# Patient Record
Sex: MALE | NOT HISPANIC OR LATINO | URBAN - METROPOLITAN AREA
[De-identification: names, ages, dates, MRNs, and addresses within clinical notes are randomized per-mention and may not be internally consistent; named-entity substitution may affect disease eponyms.]

---

## 2024-11-13 ENCOUNTER — EMERGENCY (EMERGENCY)
Facility: HOSPITAL | Age: 69
LOS: 1 days | Discharge: HOME | End: 2024-11-13
Attending: EMERGENCY MEDICINE | Admitting: EMERGENCY MEDICINE
Payer: MEDICARE

## 2024-11-13 VITALS
SYSTOLIC BLOOD PRESSURE: 137 MMHG | TEMPERATURE: 98 F | DIASTOLIC BLOOD PRESSURE: 59 MMHG | RESPIRATION RATE: 20 BRPM | OXYGEN SATURATION: 98 % | HEART RATE: 56 BPM

## 2024-11-13 VITALS
RESPIRATION RATE: 18 BRPM | TEMPERATURE: 98 F | OXYGEN SATURATION: 95 % | DIASTOLIC BLOOD PRESSURE: 63 MMHG | HEART RATE: 68 BPM | SYSTOLIC BLOOD PRESSURE: 174 MMHG

## 2024-11-13 DIAGNOSIS — N13.5 CROSSING VESSEL AND STRICTURE OF URETER WITHOUT HYDRONEPHROSIS: Chronic | ICD-10-CM

## 2024-11-13 DIAGNOSIS — Z98.89 OTHER SPECIFIED POSTPROCEDURAL STATES: Chronic | ICD-10-CM

## 2024-11-13 DIAGNOSIS — Z94.0 KIDNEY TRANSPLANT STATUS: Chronic | ICD-10-CM

## 2024-11-13 DIAGNOSIS — I77.0 ARTERIOVENOUS FISTULA, ACQUIRED: Chronic | ICD-10-CM

## 2024-11-13 DIAGNOSIS — Z90.49 ACQUIRED ABSENCE OF OTHER SPECIFIED PARTS OF DIGESTIVE TRACT: Chronic | ICD-10-CM

## 2024-11-13 DIAGNOSIS — Z90.5 ACQUIRED ABSENCE OF KIDNEY: Chronic | ICD-10-CM

## 2024-11-13 DIAGNOSIS — Z98.42 CATARACT EXTRACTION STATUS, LEFT EYE: Chronic | ICD-10-CM

## 2024-11-13 DIAGNOSIS — Z98.41 CATARACT EXTRACTION STATUS, RIGHT EYE: Chronic | ICD-10-CM

## 2024-11-13 LAB
ALBUMIN SERPL ELPH-MCNC: 2.4 G/DL — LOW (ref 3.3–5)
ALP SERPL-CCNC: 96 U/L — SIGNIFICANT CHANGE UP (ref 40–120)
ALT FLD-CCNC: 59 U/L — SIGNIFICANT CHANGE UP (ref 12–78)
ANION GAP SERPL CALC-SCNC: 8 MMOL/L — SIGNIFICANT CHANGE UP (ref 5–17)
APTT BLD: 35.9 SEC — HIGH (ref 24.5–35.6)
AST SERPL-CCNC: 73 U/L — HIGH (ref 15–37)
BASOPHILS # BLD AUTO: 0.02 K/UL — SIGNIFICANT CHANGE UP (ref 0–0.2)
BASOPHILS NFR BLD AUTO: 0.4 % — SIGNIFICANT CHANGE UP (ref 0–2)
BILIRUB SERPL-MCNC: 0.6 MG/DL — SIGNIFICANT CHANGE UP (ref 0.2–1.2)
BUN SERPL-MCNC: 21 MG/DL — SIGNIFICANT CHANGE UP (ref 7–23)
CALCIUM SERPL-MCNC: 10 MG/DL — SIGNIFICANT CHANGE UP (ref 8.5–10.1)
CHLORIDE SERPL-SCNC: 106 MMOL/L — SIGNIFICANT CHANGE UP (ref 96–108)
CO2 SERPL-SCNC: 28 MMOL/L — SIGNIFICANT CHANGE UP (ref 22–31)
CREAT SERPL-MCNC: 1.2 MG/DL — SIGNIFICANT CHANGE UP (ref 0.5–1.3)
EGFR: 67 ML/MIN/1.73M2 — SIGNIFICANT CHANGE UP
EGFR: 67 ML/MIN/1.73M2 — SIGNIFICANT CHANGE UP
EOSINOPHIL # BLD AUTO: 0.06 K/UL — SIGNIFICANT CHANGE UP (ref 0–0.5)
EOSINOPHIL NFR BLD AUTO: 1.1 % — SIGNIFICANT CHANGE UP (ref 0–6)
GLUCOSE SERPL-MCNC: 135 MG/DL — HIGH (ref 70–99)
HCT VFR BLD CALC: 22.4 % — LOW (ref 39–50)
HGB BLD-MCNC: 7.1 G/DL — LOW (ref 13–17)
IMM GRANULOCYTES NFR BLD AUTO: 0.9 % — SIGNIFICANT CHANGE UP (ref 0–0.9)
INR BLD: 1.16 RATIO — SIGNIFICANT CHANGE UP (ref 0.85–1.16)
LYMPHOCYTES # BLD AUTO: 0.46 K/UL — LOW (ref 1–3.3)
LYMPHOCYTES # BLD AUTO: 8.6 % — LOW (ref 13–44)
MCHC RBC-ENTMCNC: 28.3 PG — SIGNIFICANT CHANGE UP (ref 27–34)
MCHC RBC-ENTMCNC: 31.7 G/DL — LOW (ref 32–36)
MCV RBC AUTO: 89.2 FL — SIGNIFICANT CHANGE UP (ref 80–100)
MONOCYTES # BLD AUTO: 0.52 K/UL — SIGNIFICANT CHANGE UP (ref 0–0.9)
MONOCYTES NFR BLD AUTO: 9.7 % — SIGNIFICANT CHANGE UP (ref 2–14)
NEUTROPHILS # BLD AUTO: 4.23 K/UL — SIGNIFICANT CHANGE UP (ref 1.8–7.4)
NEUTROPHILS NFR BLD AUTO: 79.3 % — HIGH (ref 43–77)
NRBC # BLD: 0 /100 WBCS — SIGNIFICANT CHANGE UP (ref 0–0)
NRBC BLD-RTO: 0 /100 WBCS — SIGNIFICANT CHANGE UP (ref 0–0)
OB PNL STL: NEGATIVE — SIGNIFICANT CHANGE UP
PLATELET # BLD AUTO: 457 K/UL — HIGH (ref 150–400)
POTASSIUM SERPL-MCNC: 4.1 MMOL/L — SIGNIFICANT CHANGE UP (ref 3.5–5.3)
POTASSIUM SERPL-SCNC: 4.1 MMOL/L — SIGNIFICANT CHANGE UP (ref 3.5–5.3)
PROT SERPL-MCNC: 7.1 G/DL — SIGNIFICANT CHANGE UP (ref 6–8.3)
PROTHROM AB SERPL-ACNC: 13.6 SEC — HIGH (ref 9.9–13.4)
RBC # BLD: 2.51 M/UL — LOW (ref 4.2–5.8)
RBC # FLD: 18.8 % — HIGH (ref 10.3–14.5)
SODIUM SERPL-SCNC: 142 MMOL/L — SIGNIFICANT CHANGE UP (ref 135–145)
WBC # BLD: 5.29 K/UL — SIGNIFICANT CHANGE UP (ref 3.8–10.5)
WBC # FLD AUTO: 5.29 K/UL — SIGNIFICANT CHANGE UP (ref 3.8–10.5)

## 2024-11-13 PROCEDURE — 99285 EMERGENCY DEPT VISIT HI MDM: CPT | Mod: FS

## 2024-11-13 PROCEDURE — 93010 ELECTROCARDIOGRAM REPORT: CPT

## 2024-11-20 PROCEDURE — 86901 BLOOD TYPING SEROLOGIC RH(D): CPT

## 2024-11-20 PROCEDURE — 86923 COMPATIBILITY TEST ELECTRIC: CPT

## 2024-11-20 PROCEDURE — 86850 RBC ANTIBODY SCREEN: CPT

## 2024-11-20 PROCEDURE — 85730 THROMBOPLASTIN TIME PARTIAL: CPT

## 2024-11-20 PROCEDURE — 85025 COMPLETE CBC W/AUTO DIFF WBC: CPT

## 2024-11-20 PROCEDURE — 36415 COLL VENOUS BLD VENIPUNCTURE: CPT

## 2024-11-20 PROCEDURE — 85610 PROTHROMBIN TIME: CPT

## 2024-11-20 PROCEDURE — 82272 OCCULT BLD FECES 1-3 TESTS: CPT

## 2024-11-20 PROCEDURE — 93005 ELECTROCARDIOGRAM TRACING: CPT

## 2024-11-20 PROCEDURE — P9016: CPT

## 2024-11-20 PROCEDURE — 86900 BLOOD TYPING SEROLOGIC ABO: CPT

## 2024-11-20 PROCEDURE — 80053 COMPREHEN METABOLIC PANEL: CPT

## 2024-11-20 PROCEDURE — 99285 EMERGENCY DEPT VISIT HI MDM: CPT | Mod: 25

## 2024-11-20 PROCEDURE — 36430 TRANSFUSION BLD/BLD COMPNT: CPT

## 2024-11-29 ENCOUNTER — INPATIENT (INPATIENT)
Facility: HOSPITAL | Age: 69
LOS: 18 days | Discharge: NURSING, REHAB OR LONG-TERM CARE | DRG: 872 | End: 2024-12-18
Attending: FAMILY MEDICINE | Admitting: STUDENT IN AN ORGANIZED HEALTH CARE EDUCATION/TRAINING PROGRAM
Payer: MEDICARE

## 2024-11-29 ENCOUNTER — RESULT REVIEW (OUTPATIENT)
Age: 69
End: 2024-11-29

## 2024-11-29 VITALS
WEIGHT: 169.98 LBS | DIASTOLIC BLOOD PRESSURE: 64 MMHG | RESPIRATION RATE: 16 BRPM | OXYGEN SATURATION: 98 % | SYSTOLIC BLOOD PRESSURE: 180 MMHG | HEIGHT: 64 IN | TEMPERATURE: 101 F | HEART RATE: 101 BPM

## 2024-11-29 DIAGNOSIS — A41.9 SEPSIS, UNSPECIFIED ORGANISM: ICD-10-CM

## 2024-11-29 DIAGNOSIS — Z98.42 CATARACT EXTRACTION STATUS, LEFT EYE: Chronic | ICD-10-CM

## 2024-11-29 DIAGNOSIS — I77.0 ARTERIOVENOUS FISTULA, ACQUIRED: Chronic | ICD-10-CM

## 2024-11-29 DIAGNOSIS — Z98.89 OTHER SPECIFIED POSTPROCEDURAL STATES: Chronic | ICD-10-CM

## 2024-11-29 DIAGNOSIS — Z98.41 CATARACT EXTRACTION STATUS, RIGHT EYE: Chronic | ICD-10-CM

## 2024-11-29 DIAGNOSIS — Z90.5 ACQUIRED ABSENCE OF KIDNEY: Chronic | ICD-10-CM

## 2024-11-29 DIAGNOSIS — N13.5 CROSSING VESSEL AND STRICTURE OF URETER WITHOUT HYDRONEPHROSIS: Chronic | ICD-10-CM

## 2024-11-29 DIAGNOSIS — Z94.0 KIDNEY TRANSPLANT STATUS: Chronic | ICD-10-CM

## 2024-11-29 DIAGNOSIS — Z90.49 ACQUIRED ABSENCE OF OTHER SPECIFIED PARTS OF DIGESTIVE TRACT: Chronic | ICD-10-CM

## 2024-11-29 LAB
-  CTX-M RESISTANCE MARKER: SIGNIFICANT CHANGE UP
-  ESBL: SIGNIFICANT CHANGE UP
ALBUMIN SERPL ELPH-MCNC: 2.4 G/DL — LOW (ref 3.3–5)
ALP SERPL-CCNC: 116 U/L — SIGNIFICANT CHANGE UP (ref 40–120)
ALT FLD-CCNC: 44 U/L — SIGNIFICANT CHANGE UP (ref 12–78)
ANION GAP SERPL CALC-SCNC: 8 MMOL/L — SIGNIFICANT CHANGE UP (ref 5–17)
ANISOCYTOSIS BLD QL: SLIGHT — SIGNIFICANT CHANGE UP
APPEARANCE UR: ABNORMAL
APTT BLD: 28.8 SEC — SIGNIFICANT CHANGE UP (ref 24.5–35.6)
AST SERPL-CCNC: 47 U/L — HIGH (ref 15–37)
BACTERIA # UR AUTO: ABNORMAL /HPF
BASOPHILS # BLD AUTO: 0 K/UL — SIGNIFICANT CHANGE UP (ref 0–0.2)
BASOPHILS NFR BLD AUTO: 0 % — SIGNIFICANT CHANGE UP (ref 0–2)
BILIRUB SERPL-MCNC: 0.9 MG/DL — SIGNIFICANT CHANGE UP (ref 0.2–1.2)
BILIRUB UR-MCNC: NEGATIVE — SIGNIFICANT CHANGE UP
BUN SERPL-MCNC: 22 MG/DL — SIGNIFICANT CHANGE UP (ref 7–23)
CALCIUM SERPL-MCNC: 12.2 MG/DL — HIGH (ref 8.5–10.1)
CHLORIDE SERPL-SCNC: 102 MMOL/L — SIGNIFICANT CHANGE UP (ref 96–108)
CO2 SERPL-SCNC: 30 MMOL/L — SIGNIFICANT CHANGE UP (ref 22–31)
COD CRY URNS QL: PRESENT
COLOR SPEC: YELLOW — SIGNIFICANT CHANGE UP
CREAT SERPL-MCNC: 0.98 MG/DL — SIGNIFICANT CHANGE UP (ref 0.5–1.3)
CRP SERPL-MCNC: 226 MG/L — HIGH
DIFF PNL FLD: NEGATIVE — SIGNIFICANT CHANGE UP
E COLI DNA BLD POS QL NAA+NON-PROBE: SIGNIFICANT CHANGE UP
EGFR: 85 ML/MIN/1.73M2 — SIGNIFICANT CHANGE UP
EOSINOPHIL # BLD AUTO: 0 K/UL — SIGNIFICANT CHANGE UP (ref 0–0.5)
EOSINOPHIL NFR BLD AUTO: 0 % — SIGNIFICANT CHANGE UP (ref 0–6)
EPI CELLS # UR: PRESENT
ERYTHROCYTE [SEDIMENTATION RATE] IN BLOOD: 117 MM/HR — HIGH (ref 0–20)
FLUAV AG NPH QL: SIGNIFICANT CHANGE UP
FLUBV AG NPH QL: SIGNIFICANT CHANGE UP
GLUCOSE SERPL-MCNC: 229 MG/DL — HIGH (ref 70–99)
GLUCOSE UR QL: NEGATIVE MG/DL — SIGNIFICANT CHANGE UP
GRAM STN FLD: ABNORMAL
HCT VFR BLD CALC: 28.4 % — LOW (ref 39–50)
HGB BLD-MCNC: 9.2 G/DL — LOW (ref 13–17)
INR BLD: 1.13 RATIO — SIGNIFICANT CHANGE UP (ref 0.85–1.16)
KETONES UR-MCNC: NEGATIVE MG/DL — SIGNIFICANT CHANGE UP
LACTATE SERPL-SCNC: 1.8 MMOL/L — SIGNIFICANT CHANGE UP (ref 0.7–2)
LDH SERPL L TO P-CCNC: 303 U/L — HIGH (ref 50–242)
LEUKOCYTE ESTERASE UR-ACNC: ABNORMAL
LYMPHOCYTES # BLD AUTO: 0.1 K/UL — LOW (ref 1–3.3)
LYMPHOCYTES # BLD AUTO: 2 % — LOW (ref 13–44)
MACROCYTES BLD QL: SLIGHT — SIGNIFICANT CHANGE UP
MANUAL SMEAR VERIFICATION: SIGNIFICANT CHANGE UP
MCHC RBC-ENTMCNC: 29.1 PG — SIGNIFICANT CHANGE UP (ref 27–34)
MCHC RBC-ENTMCNC: 32.4 G/DL — SIGNIFICANT CHANGE UP (ref 32–36)
MCV RBC AUTO: 89.9 FL — SIGNIFICANT CHANGE UP (ref 80–100)
METHOD TYPE: SIGNIFICANT CHANGE UP
MICROCYTES BLD QL: SIGNIFICANT CHANGE UP
MONOCYTES # BLD AUTO: 0 K/UL — SIGNIFICANT CHANGE UP (ref 0–0.9)
MONOCYTES NFR BLD AUTO: 0 % — LOW (ref 2–14)
MYELOCYTES NFR BLD: 1 % — HIGH (ref 0–0)
NEUTROPHILS # BLD AUTO: 4.92 K/UL — SIGNIFICANT CHANGE UP (ref 1.8–7.4)
NEUTROPHILS NFR BLD AUTO: 92 % — HIGH (ref 43–77)
NEUTS BAND # BLD: 5 % — SIGNIFICANT CHANGE UP (ref 0–8)
NITRITE UR-MCNC: POSITIVE
NRBC # BLD: 0 /100 WBCS — SIGNIFICANT CHANGE UP (ref 0–0)
NRBC # BLD: SIGNIFICANT CHANGE UP /100 WBCS (ref 0–0)
OVALOCYTES BLD QL SMEAR: SLIGHT — SIGNIFICANT CHANGE UP
PH UR: 5.5 — SIGNIFICANT CHANGE UP (ref 5–8)
PHOSPHATE SERPL-MCNC: 3.2 MG/DL — SIGNIFICANT CHANGE UP (ref 2.5–4.5)
PLAT MORPH BLD: NORMAL — SIGNIFICANT CHANGE UP
PLATELET # BLD AUTO: 485 K/UL — HIGH (ref 150–400)
PLATELET CLUMP BLD QL SMEAR: ABNORMAL
POIKILOCYTOSIS BLD QL AUTO: SLIGHT — SIGNIFICANT CHANGE UP
POTASSIUM SERPL-MCNC: 3.8 MMOL/L — SIGNIFICANT CHANGE UP (ref 3.5–5.3)
POTASSIUM SERPL-SCNC: 3.8 MMOL/L — SIGNIFICANT CHANGE UP (ref 3.5–5.3)
PROT SERPL-MCNC: 6.9 G/DL — SIGNIFICANT CHANGE UP (ref 6–8.3)
PROT SERPL-MCNC: 7.6 G/DL — SIGNIFICANT CHANGE UP (ref 6–8.3)
PROT UR-MCNC: 30 MG/DL
PROTHROM AB SERPL-ACNC: 13.2 SEC — SIGNIFICANT CHANGE UP (ref 9.9–13.4)
PSA FLD-MCNC: 12.3 NG/ML — HIGH (ref 0–4)
RBC # BLD: 3.16 M/UL — LOW (ref 4.2–5.8)
RBC # FLD: 20.2 % — HIGH (ref 10.3–14.5)
RBC BLD AUTO: ABNORMAL
RBC CASTS # UR COMP ASSIST: 2 /HPF — SIGNIFICANT CHANGE UP (ref 0–4)
RSV RNA NPH QL NAA+NON-PROBE: SIGNIFICANT CHANGE UP
SARS-COV-2 RNA SPEC QL NAA+PROBE: SIGNIFICANT CHANGE UP
SODIUM SERPL-SCNC: 140 MMOL/L — SIGNIFICANT CHANGE UP (ref 135–145)
SP GR SPEC: 1.02 — SIGNIFICANT CHANGE UP (ref 1–1.03)
SPECIMEN SOURCE: SIGNIFICANT CHANGE UP
SPECIMEN SOURCE: SIGNIFICANT CHANGE UP
URATE CRY FLD QL MICRO: PRESENT
UROBILINOGEN FLD QL: 1 MG/DL — SIGNIFICANT CHANGE UP (ref 0.2–1)
VANCOMYCIN TROUGH SERPL-MCNC: 7.6 UG/ML — LOW (ref 10–20)
WBC # BLD: 5.07 K/UL — SIGNIFICANT CHANGE UP (ref 3.8–10.5)
WBC # FLD AUTO: 5.07 K/UL — SIGNIFICANT CHANGE UP (ref 3.8–10.5)
WBC UR QL: 67 /HPF — HIGH (ref 0–5)

## 2024-11-29 PROCEDURE — 76376 3D RENDER W/INTRP POSTPROCES: CPT | Mod: 26

## 2024-11-29 PROCEDURE — 72190 X-RAY EXAM OF PELVIS: CPT | Mod: 26

## 2024-11-29 PROCEDURE — 99285 EMERGENCY DEPT VISIT HI MDM: CPT

## 2024-11-29 PROCEDURE — 99223 1ST HOSP IP/OBS HIGH 75: CPT

## 2024-11-29 PROCEDURE — 93010 ELECTROCARDIOGRAM REPORT: CPT

## 2024-11-29 PROCEDURE — 74176 CT ABD & PELVIS W/O CONTRAST: CPT | Mod: 26,MC

## 2024-11-29 PROCEDURE — 93306 TTE W/DOPPLER COMPLETE: CPT | Mod: 26

## 2024-11-29 PROCEDURE — 71045 X-RAY EXAM CHEST 1 VIEW: CPT | Mod: 26

## 2024-11-29 PROCEDURE — 72192 CT PELVIS W/O DYE: CPT | Mod: 26,XE

## 2024-11-29 PROCEDURE — 72220 X-RAY EXAM SACRUM TAILBONE: CPT | Mod: 26

## 2024-11-29 PROCEDURE — 71250 CT THORAX DX C-: CPT | Mod: 26,MC

## 2024-11-29 RX ORDER — LISINOPRIL 20 MG/1
20 TABLET ORAL DAILY
Refills: 0 | Status: DISCONTINUED | OUTPATIENT
Start: 2024-11-29 | End: 2024-12-05

## 2024-11-29 RX ORDER — AMLODIPINE BESYLATE 10 MG/1
10 TABLET ORAL DAILY
Refills: 0 | Status: DISCONTINUED | OUTPATIENT
Start: 2024-11-29 | End: 2024-12-18

## 2024-11-29 RX ORDER — LEVOTHYROXINE SODIUM 150 MCG
88 TABLET ORAL
Refills: 0 | Status: DISCONTINUED | OUTPATIENT
Start: 2024-11-29 | End: 2024-12-18

## 2024-11-29 RX ORDER — MULTIVIT WITH MINERALS/LUTEIN
500 TABLET ORAL
Refills: 0 | Status: DISCONTINUED | OUTPATIENT
Start: 2024-11-29 | End: 2024-12-18

## 2024-11-29 RX ORDER — HYDRALAZINE HYDROCHLORIDE 10 MG/1
10 TABLET ORAL EVERY 8 HOURS
Refills: 0 | Status: DISCONTINUED | OUTPATIENT
Start: 2024-11-29 | End: 2024-12-18

## 2024-11-29 RX ORDER — LISINOPRIL 20 MG/1
1 TABLET ORAL
Refills: 0 | DISCHARGE

## 2024-11-29 RX ORDER — 0.9 % SODIUM CHLORIDE 0.9 %
1000 INTRAVENOUS SOLUTION INTRAVENOUS
Refills: 0 | Status: DISCONTINUED | OUTPATIENT
Start: 2024-11-29 | End: 2024-12-18

## 2024-11-29 RX ORDER — ONDANSETRON HYDROCHLORIDE 4 MG/1
4 TABLET, FILM COATED ORAL EVERY 8 HOURS
Refills: 0 | Status: DISCONTINUED | OUTPATIENT
Start: 2024-11-29 | End: 2024-12-18

## 2024-11-29 RX ORDER — FERROUS SULFATE 325(65) MG
325 TABLET ORAL
Refills: 0 | DISCHARGE

## 2024-11-29 RX ORDER — ACETAMINOPHEN, DIPHENHYDRAMINE HCL, PHENYLEPHRINE HCL 325; 25; 5 MG/1; MG/1; MG/1
3 TABLET ORAL AT BEDTIME
Refills: 0 | Status: DISCONTINUED | OUTPATIENT
Start: 2024-11-29 | End: 2024-12-18

## 2024-11-29 RX ORDER — TACROLIMUS 5 MG/1
2 CAPSULE ORAL DAILY
Refills: 0 | Status: DISCONTINUED | OUTPATIENT
Start: 2024-11-29 | End: 2024-12-18

## 2024-11-29 RX ORDER — PIPERACILLIN SODIUM AND TAZOBACTAM SODIUM 4; .5 G/20ML; G/20ML
3.38 INJECTION, POWDER, LYOPHILIZED, FOR SOLUTION INTRAVENOUS ONCE
Refills: 0 | Status: COMPLETED | OUTPATIENT
Start: 2024-11-29 | End: 2024-11-29

## 2024-11-29 RX ORDER — CEFEPIME 2 G/1
2000 INJECTION, POWDER, FOR SOLUTION INTRAVENOUS EVERY 8 HOURS
Refills: 0 | Status: DISCONTINUED | OUTPATIENT
Start: 2024-11-29 | End: 2024-11-29

## 2024-11-29 RX ORDER — ROSUVASTATIN CALCIUM 5 MG/1
1 TABLET, FILM COATED ORAL
Refills: 0 | DISCHARGE

## 2024-11-29 RX ORDER — CHOLECALCIFEROL (VITAMIN D3) 10MCG/0.25
1000 DROPS ORAL DAILY
Refills: 0 | Status: DISCONTINUED | OUTPATIENT
Start: 2024-11-29 | End: 2024-12-03

## 2024-11-29 RX ORDER — PYRIDOXINE HCL (VITAMIN B6) 25 MG
1 TABLET ORAL
Refills: 0 | DISCHARGE

## 2024-11-29 RX ORDER — ESCITALOPRAM OXALATE 10 MG/1
1 TABLET, FILM COATED ORAL
Refills: 0 | DISCHARGE

## 2024-11-29 RX ORDER — SODIUM CHLORIDE 9 MG/ML
1000 INJECTION, SOLUTION INTRAMUSCULAR; INTRAVENOUS; SUBCUTANEOUS ONCE
Refills: 0 | Status: COMPLETED | OUTPATIENT
Start: 2024-11-29 | End: 2024-11-29

## 2024-11-29 RX ORDER — ESCITALOPRAM OXALATE 10 MG/1
10 TABLET, FILM COATED ORAL
Refills: 0 | Status: DISCONTINUED | OUTPATIENT
Start: 2024-11-29 | End: 2024-12-18

## 2024-11-29 RX ORDER — ACETAMINOPHEN 500MG 500 MG/1
650 TABLET, COATED ORAL EVERY 6 HOURS
Refills: 0 | Status: DISCONTINUED | OUTPATIENT
Start: 2024-11-29 | End: 2024-11-29

## 2024-11-29 RX ORDER — METOPROLOL TARTRATE 100 MG/1
50 TABLET, FILM COATED ORAL
Refills: 0 | Status: DISCONTINUED | OUTPATIENT
Start: 2024-11-29 | End: 2024-11-30

## 2024-11-29 RX ORDER — SODIUM CHLORIDE 9 MG/ML
500 INJECTION, SOLUTION INTRAMUSCULAR; INTRAVENOUS; SUBCUTANEOUS ONCE
Refills: 0 | Status: COMPLETED | OUTPATIENT
Start: 2024-11-29 | End: 2024-11-29

## 2024-11-29 RX ORDER — MEROPENEM 500 MG/1
1000 INJECTION, POWDER, FOR SOLUTION INTRAVENOUS EVERY 8 HOURS
Refills: 0 | Status: DISCONTINUED | OUTPATIENT
Start: 2024-11-29 | End: 2024-11-29

## 2024-11-29 RX ORDER — TACROLIMUS 5 MG/1
1 CAPSULE ORAL AT BEDTIME
Refills: 0 | Status: DISCONTINUED | OUTPATIENT
Start: 2024-11-29 | End: 2024-12-18

## 2024-11-29 RX ORDER — MINERAL OIL
133 OIL (ML) ORAL ONCE
Refills: 0 | Status: DISCONTINUED | OUTPATIENT
Start: 2024-11-29 | End: 2024-12-18

## 2024-11-29 RX ORDER — INSULIN GLARGINE 100 [IU]/ML
17 INJECTION, SOLUTION SUBCUTANEOUS AT BEDTIME
Refills: 0 | Status: DISCONTINUED | OUTPATIENT
Start: 2024-11-29 | End: 2024-12-04

## 2024-11-29 RX ORDER — GLUCAGON INJECTION, SOLUTION 0.5 MG/.1ML
1 INJECTION, SOLUTION SUBCUTANEOUS ONCE
Refills: 0 | Status: DISCONTINUED | OUTPATIENT
Start: 2024-11-29 | End: 2024-12-18

## 2024-11-29 RX ORDER — MEROPENEM 500 MG/1
1000 INJECTION, POWDER, FOR SOLUTION INTRAVENOUS ONCE
Refills: 0 | Status: COMPLETED | OUTPATIENT
Start: 2024-11-29 | End: 2024-11-29

## 2024-11-29 RX ORDER — HEPARIN SODIUM,PORCINE 1000/ML
5000 VIAL (ML) INJECTION EVERY 8 HOURS
Refills: 0 | Status: DISCONTINUED | OUTPATIENT
Start: 2024-11-29 | End: 2024-11-30

## 2024-11-29 RX ORDER — ACETAMINOPHEN 500MG 500 MG/1
1000 TABLET, COATED ORAL ONCE
Refills: 0 | Status: COMPLETED | OUTPATIENT
Start: 2024-11-29 | End: 2024-11-29

## 2024-11-29 RX ORDER — MEROPENEM 500 MG/1
1000 INJECTION, POWDER, FOR SOLUTION INTRAVENOUS EVERY 8 HOURS
Refills: 0 | Status: DISCONTINUED | OUTPATIENT
Start: 2024-11-30 | End: 2024-11-30

## 2024-11-29 RX ORDER — MEROPENEM 500 MG/1
INJECTION, POWDER, FOR SOLUTION INTRAVENOUS
Refills: 0 | Status: DISCONTINUED | OUTPATIENT
Start: 2024-11-29 | End: 2024-11-30

## 2024-11-29 RX ORDER — PYRIDOXINE HCL (VITAMIN B6) 25 MG
50 TABLET ORAL DAILY
Refills: 0 | Status: DISCONTINUED | OUTPATIENT
Start: 2024-11-29 | End: 2024-12-18

## 2024-11-29 RX ORDER — VANCOMYCIN HCL 900 MCG/MG
1250 POWDER (GRAM) MISCELLANEOUS EVERY 12 HOURS
Refills: 0 | Status: DISCONTINUED | OUTPATIENT
Start: 2024-11-29 | End: 2024-11-29

## 2024-11-29 RX ORDER — CEFEPIME 2 G/1
INJECTION, POWDER, FOR SOLUTION INTRAVENOUS
Refills: 0 | Status: DISCONTINUED | OUTPATIENT
Start: 2024-11-29 | End: 2024-11-29

## 2024-11-29 RX ORDER — VANCOMYCIN HCL 900 MCG/MG
1000 POWDER (GRAM) MISCELLANEOUS ONCE
Refills: 0 | Status: COMPLETED | OUTPATIENT
Start: 2024-11-29 | End: 2024-11-29

## 2024-11-29 RX ORDER — ROSUVASTATIN CALCIUM 5 MG/1
10 TABLET, FILM COATED ORAL AT BEDTIME
Refills: 0 | Status: DISCONTINUED | OUTPATIENT
Start: 2024-11-29 | End: 2024-12-18

## 2024-11-29 RX ORDER — POLYETHYLENE GLYCOL 3350 17 G/17G
17 POWDER, FOR SOLUTION ORAL DAILY
Refills: 0 | Status: DISCONTINUED | OUTPATIENT
Start: 2024-11-29 | End: 2024-12-18

## 2024-11-29 RX ORDER — SENNOSIDES 8.6 MG
2 TABLET ORAL AT BEDTIME
Refills: 0 | Status: DISCONTINUED | OUTPATIENT
Start: 2024-11-29 | End: 2024-12-18

## 2024-11-29 RX ORDER — FERROUS SULFATE 325(65) MG
325 TABLET ORAL
Refills: 0 | Status: DISCONTINUED | OUTPATIENT
Start: 2024-11-29 | End: 2024-12-18

## 2024-11-29 RX ORDER — MULTIVIT WITH MINERALS/LUTEIN
1 TABLET ORAL
Refills: 0 | DISCHARGE

## 2024-11-29 RX ORDER — METOPROLOL TARTRATE 100 MG/1
5 TABLET, FILM COATED ORAL EVERY 6 HOURS
Refills: 0 | Status: DISCONTINUED | OUTPATIENT
Start: 2024-11-29 | End: 2024-12-18

## 2024-11-29 RX ORDER — MAGNESIUM, ALUMINUM HYDROXIDE 200-225/5
30 SUSPENSION, ORAL (FINAL DOSE FORM) ORAL EVERY 4 HOURS
Refills: 0 | Status: DISCONTINUED | OUTPATIENT
Start: 2024-11-29 | End: 2024-12-18

## 2024-11-29 RX ORDER — ACETAMINOPHEN 500MG 500 MG/1
1000 TABLET, COATED ORAL EVERY 8 HOURS
Refills: 0 | Status: DISCONTINUED | OUTPATIENT
Start: 2024-11-29 | End: 2024-12-08

## 2024-11-29 RX ORDER — HYDRALAZINE HYDROCHLORIDE 10 MG/1
100 TABLET ORAL THREE TIMES A DAY
Refills: 0 | Status: DISCONTINUED | OUTPATIENT
Start: 2024-11-29 | End: 2024-12-18

## 2024-11-29 RX ORDER — AZATHIOPRINE 100 MG/1
50 TABLET ORAL
Refills: 0 | Status: DISCONTINUED | OUTPATIENT
Start: 2024-11-29 | End: 2024-12-18

## 2024-11-29 RX ORDER — BUPROPION HCL 100 MG
300 TABLET ORAL DAILY
Refills: 0 | Status: DISCONTINUED | OUTPATIENT
Start: 2024-11-29 | End: 2024-12-18

## 2024-11-29 RX ORDER — SODIUM CHLORIDE 9 MG/ML
1000 INJECTION, SOLUTION INTRAMUSCULAR; INTRAVENOUS; SUBCUTANEOUS
Refills: 0 | Status: DISCONTINUED | OUTPATIENT
Start: 2024-11-29 | End: 2024-12-01

## 2024-11-29 RX ORDER — CEFEPIME 2 G/1
2000 INJECTION, POWDER, FOR SOLUTION INTRAVENOUS ONCE
Refills: 0 | Status: COMPLETED | OUTPATIENT
Start: 2024-11-29 | End: 2024-11-29

## 2024-11-29 RX ADMIN — Medication 166.67 MILLIGRAM(S): at 20:38

## 2024-11-29 RX ADMIN — PIPERACILLIN SODIUM AND TAZOBACTAM SODIUM 200 GRAM(S): 4; .5 INJECTION, POWDER, LYOPHILIZED, FOR SOLUTION INTRAVENOUS at 07:53

## 2024-11-29 RX ADMIN — Medication 2 TABLET(S): at 23:11

## 2024-11-29 RX ADMIN — SODIUM CHLORIDE 150 MILLILITER(S): 9 INJECTION, SOLUTION INTRAMUSCULAR; INTRAVENOUS; SUBCUTANEOUS at 17:37

## 2024-11-29 RX ADMIN — Medication 6: at 17:37

## 2024-11-29 RX ADMIN — ACETAMINOPHEN 500MG 400 MILLIGRAM(S): 500 TABLET, COATED ORAL at 17:39

## 2024-11-29 RX ADMIN — Medication 5000 UNIT(S): at 23:12

## 2024-11-29 RX ADMIN — ROSUVASTATIN CALCIUM 10 MILLIGRAM(S): 5 TABLET, FILM COATED ORAL at 23:11

## 2024-11-29 RX ADMIN — HYDRALAZINE HYDROCHLORIDE 100 MILLIGRAM(S): 10 TABLET ORAL at 23:11

## 2024-11-29 RX ADMIN — TACROLIMUS 1 MILLIGRAM(S): 5 CAPSULE ORAL at 23:39

## 2024-11-29 RX ADMIN — SODIUM CHLORIDE 1000 MILLILITER(S): 9 INJECTION, SOLUTION INTRAMUSCULAR; INTRAVENOUS; SUBCUTANEOUS at 07:38

## 2024-11-29 RX ADMIN — Medication 250 MILLIGRAM(S): at 08:34

## 2024-11-29 RX ADMIN — SODIUM CHLORIDE 1000 MILLILITER(S): 9 INJECTION, SOLUTION INTRAMUSCULAR; INTRAVENOUS; SUBCUTANEOUS at 09:26

## 2024-11-29 RX ADMIN — INSULIN GLARGINE 17 UNIT(S): 100 INJECTION, SOLUTION SUBCUTANEOUS at 23:45

## 2024-11-29 RX ADMIN — METOPROLOL TARTRATE 5 MILLIGRAM(S): 100 TABLET, FILM COATED ORAL at 17:58

## 2024-11-29 RX ADMIN — MEROPENEM 100 MILLIGRAM(S): 500 INJECTION, POWDER, FOR SOLUTION INTRAVENOUS at 23:11

## 2024-11-29 RX ADMIN — ACETAMINOPHEN 500MG 400 MILLIGRAM(S): 500 TABLET, COATED ORAL at 07:35

## 2024-11-29 RX ADMIN — CEFEPIME 100 MILLIGRAM(S): 2 INJECTION, POWDER, FOR SOLUTION INTRAVENOUS at 18:40

## 2024-11-29 RX ADMIN — SODIUM CHLORIDE 500 MILLILITER(S): 9 INJECTION, SOLUTION INTRAMUSCULAR; INTRAVENOUS; SUBCUTANEOUS at 18:41

## 2024-11-29 RX ADMIN — SODIUM CHLORIDE 150 MILLILITER(S): 9 INJECTION, SOLUTION INTRAMUSCULAR; INTRAVENOUS; SUBCUTANEOUS at 18:40

## 2024-11-29 NOTE — CONSULT NOTE ADULT - SUBJECTIVE AND OBJECTIVE BOX
SURGERY PA CONSULT NOTE:    CHIEF COMPLAINT:  Patient is a 69y old  Male who presents with a chief complaint of AMS (2024 14:51)      HPI FROM ED:  69-year-old male with history of DM, ESRD s/o kidney transplant, depression, hypertension, anxiety, hyperlipidemia, hypothyroidism, liver cell CA, anemia bibems from [facility] for ams, decreased responsiveness and chills. pt unable to provide much history but responds to name and denies pain, found to be febrile in ED    INTERVAL HPI:   History as stated above. Patient unable to provide history.    PAST MEDICAL HISTORY:  Diabetes Mellitus Type II insulin pump ()  GERD (gastroesophageal reflux disease)  Depression  Hypothyroidism  Hyperlipidemia  Kidney transplanted   Hypertension  ANGELIKA (obstructive sleep apnea)  Peripheral neuropathy  Shoulder fracture; Left.  History of colonoscopy    PAST SURGICAL HISTORY:  S/P kidney transplant;   S/P cholecystectomy  Retroperitoneal fibrosis; s/p surgery  AV fistula; Right arm  S/P right cataract extraction  S/P left cataract extraction  H/O unilateral nephrectomy; Left    REVIEW OF SYSTEMS:  Unable to obtain    MEDICATIONS:  Home Medications:  amLODIPine 10 mg oral tablet: 1 tab(s) orally once a day (2024 12:24)  ascorbic acid 500 mg oral tablet: 1 tab(s) orally 2 times a day (2024 12:22)  aspirin 81 mg oral delayed release tablet: 1 tab(s) orally once a day (2024 12:24)  azaTHIOprine 50 mg oral tablet: 1 tab(s) orally 2 times a day (2024 11:54)  buPROPion 300 mg/24 hours (XL) oral tablet, extended release: 1 tab(s) orally once a day (2024 12:24)  cholecalciferol 25 mcg (1000 intl units) oral tablet: 1 tab(s) orally once a day (2024 12:24)  Crestor 10 mg oral tablet: 1 tab(s) orally once a day (at bedtime) (2024 12:22)  escitalopram 10 mg oral tablet: 1 tab(s) orally 3 times a day (2024 12:22)  ferrous sulfate: 325 milligram(s) orally 2 times a day (2024 12:22)  hydrALAZINE 100 mg oral tablet: 1 tab(s) orally 3 times a day (2024 12:24)  insulin glargine 100 units/mL subcutaneous solution: 35 unit(s) subcutaneous once a day (at bedtime) (2024 11:56)  levothyroxine 88 mcg (0.088 mg) oral tablet: 1 tab(s) orally once a day (2024 12:24)  lisinopril 20 mg oral tablet: 1 tab(s) orally once a day (2024 12:22)  metoprolol tartrate 50 mg oral tablet: 1 tab(s) orally 2 times a day (2024 12:24)  NovoLOG FlexPen 100 units/mL injectable solution: 10 unit(s) injectable 3 times a day (before meals) (2024 12:22)  pyridoxine 50 mg oral tablet: 1 tab(s) orally once a day (2024 12:22)  senna leaf extract oral tablet: 1 tab(s) orally once a day (at bedtime) (2024 11:55)  tacrolimus 1 mg oral capsule: 1 orally 2 capsules orally in the morning and 1 capsule orally in the evening (2024 12:24)    MEDICATIONS  (STANDING):  amLODIPine   Tablet 10 milliGRAM(s) Oral daily  ascorbic acid 500 milliGRAM(s) Oral two times a day  aspirin enteric coated 81 milliGRAM(s) Oral daily  azaTHIOprine 50 milliGRAM(s) Oral two times a day  buPROPion XL (24-Hour) . 300 milliGRAM(s) Oral daily  cholecalciferol 1000 Unit(s) Oral daily  dextrose 5%. 1000 milliLiter(s) (50 mL/Hr) IV Continuous <Continuous>  dextrose 5%. 1000 milliLiter(s) (100 mL/Hr) IV Continuous <Continuous>  dextrose 50% Injectable 25 Gram(s) IV Push once  dextrose 50% Injectable 12.5 Gram(s) IV Push once  dextrose 50% Injectable 25 Gram(s) IV Push once  escitalopram 10 milliGRAM(s) Oral <User Schedule>  ferrous    sulfate 325 milliGRAM(s) Oral two times a day  glucagon  Injectable 1 milliGRAM(s) IntraMuscular once  hydrALAZINE 100 milliGRAM(s) Oral three times a day  insulin glargine Injectable (LANTUS) 17 Unit(s) SubCutaneous at bedtime  insulin lispro (ADMELOG) corrective regimen sliding scale   SubCutaneous three times a day before meals  levothyroxine 88 MICROGram(s) Oral <User Schedule>  lisinopril 20 milliGRAM(s) Oral daily  meropenem  IVPB 1000 milliGRAM(s) IV Intermittent every 8 hours  metoprolol tartrate 50 milliGRAM(s) Oral two times a day  polyethylene glycol 3350 17 Gram(s) Oral daily  pyridoxine 50 milliGRAM(s) Oral daily  rosuvastatin 10 milliGRAM(s) Oral at bedtime  senna 2 Tablet(s) Oral at bedtime  sodium chloride 0.9% Bolus 500 milliLiter(s) IV Bolus once  sodium chloride 0.9%. 1000 milliLiter(s) (150 mL/Hr) IV Continuous <Continuous>  tacrolimus 2 milliGRAM(s) Oral daily  tacrolimus 1 milliGRAM(s) Oral at bedtime  vancomycin  IVPB 1250 milliGRAM(s) IV Intermittent every 12 hours    MEDICATIONS  (PRN):  acetaminophen     Tablet .. 650 milliGRAM(s) Oral every 6 hours PRN Temp greater or equal to 38C (100.4F), Mild Pain (1 - 3)  aluminum hydroxide/magnesium hydroxide/simethicone Suspension 30 milliLiter(s) Oral every 4 hours PRN Dyspepsia  dextrose Oral Gel 15 Gram(s) Oral once PRN Blood Glucose LESS THAN 70 milliGRAM(s)/deciliter  melatonin 3 milliGRAM(s) Oral at bedtime PRN Insomnia  ondansetron Injectable 4 milliGRAM(s) IV Push every 8 hours PRN Nausea and/or Vomiting      ALLERGIES:  Allergies    No Known Allergies    Intolerances        SOCIAL HISTORY:  Social History:    Smoking: Yes [ ]  No [ ]   ______pk yrs  ETOH  Yes [ ]  No [ ]  Social [ ]  DRUGS:  Yes [ ]  No [ ]  if so what______________    FAMILY HISTORY:  FAMILY HISTORY:  MI (myocardial infarction)    Family history of obesity (Sibling)        VITAL SIGNS:  Vital Signs Last 24 Hrs  T(C): 37.8 (2024 10:34), Max: 39.6 (2024 07:23)  T(F): 100.1 (2024 10:34), Max: 103.2 (2024 07:23)  HR: 66 (2024 10:34) (66 - 101)  BP: 173/74 (2024 10:34) (151/69 - 202/55)  RR: 18 (2024 10:34) (16 - 18)  SpO2: 96% (2024 10:34) (94% - 98%)    Parameters below as of 2024 10:34  Patient On (Oxygen Delivery Method): room air    PHYSICAL EXAM:  GENERAL: NAD, lying in bed diaphoretic   HEAD:  Atraumatic, normocephalic  NECK: Supple, trachea midline, no JVD  HEART: Regular rate and rhythm  LUNGS: Unlabored respirations.  SKIN: Sacral region stage 1 ulcer; left more than right, erythematous, nontender to touch    LABS:                        9.2    5.07  )-----------( 485      ( 2024 07:15 )             28.4     140  |  102  |  22  ----------------------------<  229[H]  3.8   |  30  |  0.98    Ca    12.2[H]      2024 07:38    TPro  7.6  /  Alb  2.4[L]  /  TBili  0.9  /  DBili  x   /  AST  47[H]  /  ALT  44  /  AlkPhos  116  11-29    PT/INR - ( 2024 07:15 )   PT: 13.2 sec;   INR: 1.13 ratio       PTT - ( 2024 07:15 )  PTT:28.8 sec  Urinalysis Basic - ( 2024 11:00 )    Color: Yellow / Appearance: Cloudy / S.025 / pH: x  Gluc: x / Ketone: Negative mg/dL  / Bili: Negative / Urobili: 1.0 mg/dL   Blood: x / Protein: 30 mg/dL / Nitrite: Positive   Leuk Esterase: Moderate / RBC: 2 /HPF / WBC 67 /HPF   Sq Epi: x / Non Sq Epi: x / Bacteria: Few /HPF    LIVER FUNCTIONS - ( 2024 07:38 )  Alb: 2.4 g/dL / Pro: 7.6 g/dL / ALK PHOS: 116 U/L / ALT: 44 U/L / AST: 47 U/L / GGT: x           Urinalysis with Rflx Culture (collected 2024 11:00)      RADIOLOGY & ADDITIONAL STUDIES:  ACC: [id number] EXAM:  CT ABDOMEN AND PELVIS   ORDERED BY: LILIBETH HORTA   ACC: [id number] EXAM:  CT CHEST   ORDERED BY: LILIBETH HORTA   PROCEDURE DATE:  2024    INTERPRETATION:  CLINICAL INFORMATION: Sepsis.    COMPARISON: CT scan chest abdomen and pelvis 2023    CONTRAST/COMPLICATIONS:  IV Contrast: NONE  Oral Contrast: NONE    PROCEDURE:  CT of the Chest, Abdomen and Pelvis was performed.  Sagittal and coronal reformats were performed.    FINDINGS:    CHEST:    LUNGS AND LARGE AIRWAYS: PLEURA:    Small left pleural effusion with small loculated components. Aspect of the fissure.  Partial left lower lobe atelectasis.    The central airways are patent.    VESSELS: Atherosclerotic changes thoracic aorta and coronary artery calcification.    HEART:  Cardiomegaly. Small to moderate pericardial effusion.    Small hiatal hernia.    MEDIASTINUM AND PATRICE: No lymphadenopathy.    CHEST WALL AND LOWER NECK:  Bilateral gynecomastia.    ABDOMEN AND PELVIS:    Streak artifact degrades image quality.  Evaluation of the solid organ parenchyma is limited without intravenous contrast.    LIVER:  Approximately 5.5 cm low-density lesion anterior right hepatic lobe.  BILE DUCTS: Probable mild intrahepatic biliary ductal prominence.  GALLBLADDER: Prior cholecystectomy.  SPLEEN: Splenomegaly.  PANCREAS: Within normal limits.  ADRENALS: Within normal limits.  KIDNEYS/URETERS:  Atrophic right kidney.  Absent left kidney.  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    Metallic streak artifact related to patient's left hip arthroplasty degrades image quality limiting evaluation of the pelvis.    BLADDER: Bladder wall thickening, correlate for cystitis.  REPRODUCTIVE ORGANS: Limited.    BOWEL:  There is colonic fecal retention, without bowel obstruction.There is rectal wall thickening with perirectal and presacral inflammatory stranding/fluid.Findings are suggestive of a stercoral proctitis.No extraluminal gas/air or drainable fluid collection is noted.Appendix is normal.  PERITONEUM/RETROPERITONEUM:  Confluent soft tissue within the periaortic and aortocaval retroperitoneum, similar to prior exam compatible with known retroperitoneal fibrosis.    VESSELS: Atherosclerotic changes.  LYMPH NODES: No lymphadenopathy.    ABDOMINAL WALL:  Postsurgical changes.  Small bilateral fat-containing inguinal hernias.    BONES:  Patchy sclerosis and osteolysis throughout the bilateral sacrum with pathologic fractures.There is soft tissue with stranding extending throughout the presacral and posterior extraperitoneal pelvic soft tissues.  There are a few bubbles of air/gas at the right sacral pathologic fracture.    Partially imaged left hip arthroplasty.  Degenerative changes.    IMPRESSION:    Pathologic fractures of the bilateral sacrum with mottled sclerosis/osteolysis, presacral/posterior pelvic extraperitoneal soft tissue stranding and small bubbles of gas/air; findings suggestive of infection/osteomyelitis.    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    Low-density lesion right hepatic lobe, which is poorly characterized on this noncontrast exam.Recommend further evaluation with MRI.    Above findings were discussed with Dr. Harrington at the time of interpretation on 2024    Bladder wall thickening, correlate for cystitis.    Small left-sided pleural effusion with small loculated component.Partial left lower lobe atelectasis.    Other findings as discussed above.    --- End of Report ---    ASSESSMENT:  68yo M, PMH DM, HTN, HLD, h/o kidney transplant, hypothyroidism, presents to ED from [facility] for AMS now admitted for sepsis. Febrile    PLAN:  - Stage 1 pressure ulcer noted   - CT noted  - Recommend offloading/ position changing   - No acute surgical intervention   - Surgery to sign off please call as needed  - Rest of care per primary team  - Case discussed with Dr. Valverde

## 2024-11-29 NOTE — ED ADULT NURSE NOTE - CHIEF COMPLAINT QUOTE
Patient brought in by ambulance from  [facility] as reported altered mental status; less responsive  for the past 16 hours Temp in .5

## 2024-11-29 NOTE — ED ADULT NURSE REASSESSMENT NOTE - NSFALLHARMRISKINTERV_ED_ALL_ED
Assistance OOB with selected safe patient handling equipment if applicable/Assistance with ambulation/Communicate risk of Fall with Harm to all staff, patient, and family/Monitor gait and stability/Monitor for mental status changes and reorient to person, place, and time, as needed/Provide visual cue: red socks, yellow wristband, yellow gown, etc/Reinforce activity limits and safety measures with patient and family/Toileting schedule using arm’s reach rule for commode and bathroom/Use of alarms - bed, stretcher, chair and/or video monitoring/Bed in lowest position, wheels locked, appropriate side rails in place/Call bell, personal items and telephone in reach/Instruct patient to call for assistance before getting out of bed/chair/stretcher/Non-slip footwear applied when patient is off stretcher/[place] to call system/Physically safe environment - no spills, clutter or unnecessary equipment/Purposeful Proactive Rounding/Room/bathroom lighting operational, light cord in reach

## 2024-11-29 NOTE — ED ADULT NURSE REASSESSMENT NOTE - NS ED NURSE REASSESS COMMENT FT1
Pt received in room 3A. Resident MD notified of preliminary growth of gram negative rods in both the aerobic and anaerobic bottles. Report given to NURA Claudio over the phone as pt with bed assignment. Pt still nonverbal but now looking at staff members when speaking. Pt remains febrile, ice packs applied to assist in cooling pt. Pt pending transport to floor

## 2024-11-29 NOTE — CHART NOTE - NSCHARTNOTEFT_GEN_A_CORE
Discussed case with Dr. Santos. From the general orthopedics standpoint, IR bone culture for diagnosis and antibiotic guidance. Recommend IV antibiotics per ID consult for possible osteo/infection seen on CT scan. Recommended to reach out to spine surgeon for further management of findings on CT.    Discussed case with Dr. Bunn, orthopedic spine surgeon. At this time, continue with the above care of plan. Continue to follow up with previously sent tumor labs. Will also obtain CT 3D recon of the sacrum as well as XR of the sacrum to further evaluate pathologic sacral fractures. Will continue to follow. Discussed case with Dr. Santos. From the general orthopedics standpoint, IR bone culture for diagnosis and antibiotic guidance. Recommend IV antibiotics per ID consult for possible osteo/infection seen on CT scan. Recommended to reach out to spine surgeon for further management of findings on CT.    Discussed case with Dr. Bunn, orthopedic spine surgeon. At this time, continue with the above care of plan. Continue to follow up with previously sent tumor labs. Will also obtain CT 3D recon of the sacrum as well as XR of the sacrum to further evaluate pathologic sacral fractures. Will continue to follow.    Consult seen and Discussed with Resident, agree with plan above

## 2024-11-29 NOTE — ED PROVIDER NOTE - NSICDXPASTSURGICALHX_GEN_ALL_CORE_FT
PAST SURGICAL HISTORY:  AV fistula Right arm    H/O unilateral nephrectomy Left    History of colonoscopy     Retroperitoneal fibrosis s/p surgery    S/P cholecystectomy     S/P kidney transplant 2012    S/P left cataract extraction     S/P right cataract extraction

## 2024-11-29 NOTE — ED PROVIDER NOTE - CLINICAL SUMMARY MEDICAL DECISION MAKING FREE TEXT BOX
69-year-old male with history of DM, ESRD s/o kidney transplant, depression, hypertension, anxiety, hyperlipidemia, hypothyroidism, liver cell CA, anemia bibems from [facility] for ams, decreased responsiveness and chills. pt unable to provide much history but responds to name and denies pain, found to be febrile in ED   febrile in ED,  will check lbas, culutres, empiric abx, CTs

## 2024-11-29 NOTE — ED ADULT TRIAGE NOTE - CHIEF COMPLAINT QUOTE
Patient brought in by ambulance from  [facility] as reported altered mental status; less responsive  for the past 16 hours Temp in .5

## 2024-11-29 NOTE — ED PROVIDER NOTE - OBJECTIVE STATEMENT
69-year-old male with history of DM, ESRD s/o kidney transplant, depression, hypertension, anxiety, hyperlipidemia, hypothyroidism, liver cell CA, anemia bibems from [facility] for ams, decreased responsiveness and chills. pt unable to provide much history but responds to name and denies pain, found to be febrile in ED

## 2024-11-29 NOTE — ED ADULT NURSE REASSESSMENT NOTE - NS ED NURSE REASSESS COMMENT FT1
patient noted to have a rectal temp of 103 and a BP of 196/81. Dr Ale Ibrahim was made aware. Dr Ibrahim will order IV tylenol and IV metoprolol

## 2024-11-29 NOTE — PHARMACOTHERAPY INTERVENTION NOTE - COMMENTS
Recommended to change cefepime to meropenem 1g IV q8h since the blood culture grew ESBL E. coli.    John Joseph, PharmD, [facility]  Clinical Pharmacy Specialist, Infectious Diseases  Tele-Antimicrobial Stewardship Program (Tele-ASP)  Tele-ASP Phone: [phone]

## 2024-11-29 NOTE — CONSULT NOTE ADULT - ASSESSMENT
DOCUMENTATION IN PROGRESS   68yo M, PMH DM, HTN, HLD, h/o kidney transplant, hypothyroidism, presents to ED from [facility] for AMS, found to be febrile with positive UA. Also found to have pericardial effusion, Stercoral proctitis. and posible sacral osteomyelitis. Cardiology called for pericardial effusion.     - Ct revealed Small left pleural effusion with small loculated components, Small to moderate pericardial effusion. Approximately 5.5 cm low-density lesion anterior right hepatic lobe. Atrophic right kidney. Absent left kidney. Right pelvic transplant kidney with mild fullness of the pelvi-calyceal system. Colonic fecal retention. Stercoral proctitis. Patchy sclerosis and osteolysis throughout the bilateral sacrum with pathologic fractures. There is soft tissue with stranding extending throughout the presacral and posterior extraperitoneal pelvic soft tissues.There are a few bubbles of air/gas at the right sacral pathologic fracture, findings suggestive of infection/osteomyelitis.   - Sepsis w/u per medicine.   - Orthopedic seen, f/u MRI LSp/Pelvis  - ID consult    - Please obtain transthoracic echocardiogram to assess the pericardial effusion and to evaluate signs of tamponade  - Pt w/no signs of tamponade on examination    - EKG with  - Avoid anticoagulants   - IV fluids as needed.  - No evidence of any meaningful volume overload     - No evidence of any active ischemia    - BP labile 150-200s   - Monitor and replete lytes, keep K>4, Mg>2.  - Will continue to follow.     DOCUMENTATION IN PROGRESS   68yo M, PMH DM, HTN, HLD, h/o kidney transplant, hypothyroidism, presents to ED from [facility] for AMS, found to be febrile with positive UA. 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 Cardiology called for pericardial effusion.     - Ct revealed Small left pleural effusion with small loculated components, Small to moderate pericardial effusion. Approximately 5.5 cm low-density lesion anterior right hepatic lobe. Atrophic right kidney. Absent left kidney. 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 Colonic fecal retention. Stercoral proctitis. Patchy sclerosis and osteolysis throughout the bilateral sacrum with pathologic fractures. There is soft tissue with stranding extending throughout the presacral and posterior extraperitoneal pelvic soft tissues. There are a few bubbles of air/gas at the right sacral pathologic fracture, findings suggestive of infection/osteomyelitis.   - Sepsis w/u per medicine.   - Orthopedic seen, f/u MRI LSp/Pelvis  - ID consult    - Please obtain transthoracic echocardiogram to assess the pericardial effusion and to evaluate signs of tamponade  - Pt w/no signs of tamponade on examination    - EKG with  - Avoid anticoagulants   - IV fluids as needed.  - No evidence of any meaningful volume overload     - No evidence of any active ischemia  - Continue aspirin and statin     - BP labile 150-200s   - Continue Norvasc 10, Hydralazine 100 tid [id number] BID and, lisinopril 20    - Monitor and replete lytes, keep K>4, Mg>2.  - Will continue to follow.     70yo M, PMH DM, HTN, HLD, h/o kidney transplant, hypothyroidism, presents to ED from [facility] for AMS, found to be febrile with positive UA. 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 Cardiology called for pericardial effusion.     - Ct revealed Small left pleural effusion with small loculated components, Small to moderate pericardial effusion. Approximately 5.5 cm low-density lesion anterior right hepatic lobe. Atrophic right kidney. Absent left kidney. 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 Colonic fecal retention. Stercoral proctitis. Patchy sclerosis and osteolysis throughout the bilateral sacrum with pathologic fractures. There is soft tissue with stranding extending throughout the presacral and posterior extraperitoneal pelvic soft tissues. There are a few bubbles of air/gas at the right sacral pathologic fracture, findings suggestive of infection/osteomyelitis.   - Sepsis w/u per medicine.   - Orthopedic seen, f/u MRI LSp/Pelvis  - ID consult    - Please obtain transthoracic echocardiogram to assess the pericardial effusion and to evaluate signs of tamponade  - Pt w/no signs of tamponade on examination    - EKG with  - Avoid anticoagulants   - IV fluids as needed.  - No evidence of any meaningful volume overload     - No evidence of any active ischemia  - Continue aspirin and statin     - BP labile 150-200s   - Continue Norvasc 10, Hydralazine 100 tid [id number] BID and, lisinopril 20    - Monitor and replete lytes, keep K>4, Mg>2.  - Will continue to follow.     68yo M, PMH DM, HTN, HLD, h/o kidney transplant, hypothyroidism, presents to ED from [facility] for AMS, found to be febrile with positive UA. 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 Cardiology called for pericardial effusion.     - Ct revealed Small left pleural effusion with small loculated components, Small to moderate pericardial effusion. Approximately 5.5 cm low-density lesion anterior right hepatic lobe. Atrophic right kidney. Absent left kidney. 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 Colonic fecal retention. Stercoral proctitis. Patchy sclerosis and osteolysis throughout the bilateral sacrum with pathologic fractures. There is soft tissue with stranding extending throughout the presacral and posterior extraperitoneal pelvic soft tissues. There are a few bubbles of air/gas at the right sacral pathologic fracture, findings suggestive of infection/osteomyelitis.   - Sepsis w/u per medicine.   - Orthopedic seen, f/u MRI LSp/Pelvis  - ID consult    - Please obtain transthoracic echocardiogram to assess the pericardial effusion and to evaluate signs of tamponade  - Pt w/no signs of tamponade on examination    - EKG with nonspecific TWI  - Avoid anticoagulants   - IV fluids as needed.  - No evidence of any meaningful volume overload        - Continue aspirin and statin     - BP labile 150-200s   - Continue Norvasc 10, Hydralazine 100 tid [id number] BID and, lisinopril 20    - Monitor and replete lytes, keep K>4, Mg>2.  - Will continue to follow.

## 2024-11-29 NOTE — CONSULT NOTE ADULT - SUBJECTIVE AND OBJECTIVE BOX
[facility], Division of Infectious Diseases  WANG Mccoy S. Shah, Y. Patel, G. [facility]  [phone]    JAN CALVIN  69y, Male  [id number]    HPI--  HPI:  Patient is a 68yo M, PMH DM, HTN, HLD, h/o kidney transplant, hypothyroidism, presents to ED from [facility] for AMS. Patient is lethargic and unable to provide history at this time. Information obtained from transfer record and chart.   Patient received Zosyn and Azithromycin - for PNA per transfer record.  UA positive for UTI  pan scan revealed:  Small left pleural effusion with small loculated components  Small to moderate pericardial effusion.  Approximately 5.5 cm low-density lesion anterior right hepatic lobe.  Atrophic right kidney.  Absent left kidney.  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  Colonic fecal retention  Stercoral proctitis.  Patchy sclerosis and osteolysis throughout the bilateral sacrum with   pathologic fractures.There is soft tissue with stranding extending   throughout the presacral and posterior extraperitoneal pelvic soft   tissues.  There are a few bubbles of air/gas at the right sacral pathologic   fracture, findings suggestive of   infection/osteomyelitis.   (2024 12:47)        Active Medications--  acetaminophen     Tablet .. 650 milliGRAM(s) Oral every 6 hours PRN  aluminum hydroxide/magnesium hydroxide/simethicone Suspension 30 milliLiter(s) Oral every 4 hours PRN  amLODIPine   Tablet 10 milliGRAM(s) Oral daily  ascorbic acid 500 milliGRAM(s) Oral two times a day  aspirin enteric coated 81 milliGRAM(s) Oral daily  azaTHIOprine 50 milliGRAM(s) Oral two times a day  buPROPion XL (24-Hour) . 300 milliGRAM(s) Oral daily  cholecalciferol 1000 Unit(s) Oral daily  dextrose 5%. 1000 milliLiter(s) IV Continuous <Continuous>  dextrose 5%. 1000 milliLiter(s) IV Continuous <Continuous>  dextrose 50% Injectable 25 Gram(s) IV Push once  dextrose 50% Injectable 12.5 Gram(s) IV Push once  dextrose 50% Injectable 25 Gram(s) IV Push once  dextrose Oral Gel 15 Gram(s) Oral once PRN  escitalopram 10 milliGRAM(s) Oral <User Schedule>  ferrous    sulfate 325 milliGRAM(s) Oral two times a day  glucagon  Injectable 1 milliGRAM(s) IntraMuscular once  heparin   Injectable 5000 Unit(s) SubCutaneous every 8 hours  hydrALAZINE 100 milliGRAM(s) Oral three times a day  insulin glargine Injectable (LANTUS) 17 Unit(s) SubCutaneous at bedtime  insulin lispro (ADMELOG) corrective regimen sliding scale   SubCutaneous three times a day before meals  levothyroxine 88 MICROGram(s) Oral <User Schedule>  lisinopril 20 milliGRAM(s) Oral daily  melatonin 3 milliGRAM(s) Oral at bedtime PRN  meropenem  IVPB 1000 milliGRAM(s) IV Intermittent every 8 hours  metoprolol tartrate 50 milliGRAM(s) Oral two times a day  ondansetron Injectable 4 milliGRAM(s) IV Push every 8 hours PRN  polyethylene glycol 3350 17 Gram(s) Oral daily  pyridoxine 50 milliGRAM(s) Oral daily  rosuvastatin 10 milliGRAM(s) Oral at bedtime  senna 2 Tablet(s) Oral at bedtime  sodium chloride 0.9% Bolus 500 milliLiter(s) IV Bolus once  sodium chloride 0.9%. 1000 milliLiter(s) IV Continuous <Continuous>  tacrolimus 2 milliGRAM(s) Oral daily  tacrolimus 1 milliGRAM(s) Oral at bedtime  vancomycin  IVPB 1250 milliGRAM(s) IV Intermittent every 12 hours    Antimicrobials:   meropenem  IVPB 1000 milliGRAM(s) IV Intermittent every 8 hours  vancomycin  IVPB 1250 milliGRAM(s) IV Intermittent every 12 hours    Immunologic: azaTHIOprine 50 milliGRAM(s) Oral two times a day  tacrolimus 2 milliGRAM(s) Oral daily  tacrolimus 1 milliGRAM(s) Oral at bedtime      ROS:  CONSTITUTIONAL: No fevers or chills. No weakness or headache. No weight changes.  EYES/ENT: No visual or hearing changes. No sore throat or throat pain .  NECK: No pain or stiffness  RESPIRATORY: No cough, wheezing, or hemoptysis. No shortness of breath  CARDIOVASCULAR: No chest pain or palpitations  GASTROINTESTINAL: No abdominal pain. No nausea or vomiting. No diarrhea or constipation.  GENITOURINARY: No dysuria, frequency or hematuria  NEUROLOGICAL: No numbness or weakness  SKIN: No itching or rashes  PSYCHIATRIC: Pleasant. Appropriate affect    Allergies: No Known Allergies    PMH -- Diabetes Mellitus Type II    ESRD on Dialysis    GERD (gastroesophageal reflux disease)    Depression    Hypothyroidism    Hyperlipidemia    Kidney transplanted    Hypertension    ANGELIKA (obstructive sleep apnea)    Peripheral neuropathy    Shoulder fracture      PSH -- History of colonoscopy    S/P kidney transplant    S/P cholecystectomy    Retroperitoneal fibrosis    AV fistula    S/P right cataract extraction    S/P left cataract extraction    H/O unilateral nephrectomy      FH -- MI (myocardial infarction)    Family history of obesity (Sibling)      Social History --  EtOH: denies   Tobacco: denies   Drug Use: denies     Travel/Environmental/Occupational History:    Physical Exam--  Vital Signs Last 24 Hrs  T(F): 103 (2024 16:21), Max: 103.2 (2024 07:23)  HR: 82 (2024 16:21) (66 - 101)  BP: 196/81 (2024 16:21) (151/69 - 202/55)  RR: 18 (2024 10:34) (16 - 18)  SpO2: 97% (2024 16:21) (94% - 98%)  General: nontoxic-appearing, no acute distress  HEENT: NC/AT, EOMI,  Lungs: Clear bilaterally without rales, wheezing or rhonchi  Heart: Regular rate and rhythm. No murmur, rub or gallop.  Abdomen: Soft. Nondistended. Nontender. No costovertebral angle tenderness.  Extremities: No cyanosis or clubbing. No edema.   Skin: Warm. Dry. Good turgor.   Laboratory & Imaging Data:  CBC:                       9.2    5.07  )-----------( 485      ( 2024 07:15 )             28.4     CMP:     140  |  102  |  22  ----------------------------<  229[H]  3.8   |  30  |  0.98    Ca    12.2[H]      2024 07:38    TPro  7.6  /  Alb  2.4[L]  /  TBili  0.9  /  DBili  x   /  AST  47[H]  /  ALT  44  /  AlkPhos  116      LIVER FUNCTIONS - ( 2024 07:38 )  Alb: 2.4 g/dL / Pro: 7.6 g/dL / ALK PHOS: 116 U/L / ALT: 44 U/L / AST: 47 U/L / GGT: x           Urinalysis Basic - ( 2024 11:00 )    Color: Yellow / Appearance: Cloudy / S.025 / pH: x  Gluc: x / Ketone: Negative mg/dL  / Bili: Negative / Urobili: 1.0 mg/dL   Blood: x / Protein: 30 mg/dL / Nitrite: Positive   Leuk Esterase: Moderate / RBC: 2 /HPF / WBC 67 /HPF   Sq Epi: x / Non Sq Epi: x / Bacteria: Few /HPF        Microbiology: reviewed    Urinalysis with Rflx Culture (collected 24 @ 11:00)          Radiology: reviewed    < from: CT Abdomen and Pelvis No Cont (24 @ 07:53) >    ACC: [id number] EXAM:  CT ABDOMEN AND PELVIS   ORDERED BY: LILIBETH HORTA     ACC: [id number] EXAM:  CT CHEST   ORDERED BY: LILIBETH HORTA     PROCEDURE DATE:  2024          INTERPRETATION:  CLINICAL INFORMATION: Sepsis.    COMPARISON: CT scan chest abdomen and pelvis 2023    CONTRAST/COMPLICATIONS:  IV Contrast: NONE  Oral Contrast: NONE      PROCEDURE:  CT of the Chest, Abdomen and Pelvis was performed.  Sagittal and coronal reformats were performed.    FINDINGS:    CHEST:    LUNGS AND LARGE AIRWAYS: PLEURA:    Small left pleural effusion with small loculated components. Aspect of   the fissure.  Partial left lower lobe atelectasis.    The central airways are patent.    VESSELS: Atherosclerotic changes thoracic aorta and coronary artery   calcification.    HEART:   Cardiomegaly.  Small to moderate pericardial effusion.    Small hiatal hernia.    MEDIASTINUM AND PATRICE: No lymphadenopathy.    CHEST WALL AND LOWER NECK:  Bilateral gynecomastia.    ABDOMEN AND PELVIS:    Streak artifact degrades image quality.    Evaluation of the solid organ parenchyma is limited without intravenous   contrast.    LIVER:  Approximately 5.5 cm low-density lesion anterior right hepatic lobe.  BILE DUCTS: Probable mild intrahepatic biliary ductal prominence.  GALLBLADDER: Prior cholecystectomy.  SPLEEN: Splenomegaly.  PANCREAS: Within normal limits.  ADRENALS: Within normal limits.  KIDNEYS/URETERS:  Atrophic right kidney.  Absent left kidney.  Right pelvic transplant kidney with mild fullness of the pelvicalyceal   system.    Metallic streak artifact related to patient's left hip arthroplasty   degrades image quality limiting evaluation of the pelvis.    BLADDER: Bladder wall thickening, correlate for cystitis.  REPRODUCTIVE ORGANS: Limited.    BOWEL:   There is colonic fecal retention, without bowel obstruction.  There is rectal wall thickening with perirectal and presacral   inflammatory stranding/fluid.  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  No extraluminal gas/air or drainable fluid collection is noted.  Appendix is normal.  PERITONEUM/RETROPERITONEUM:  Confluent soft tissue within the periaortic and aortocaval   retroperitoneum, similar to prior exam compatible with known   retroperitoneal fibrosis.    VESSELS: Atherosclerotic changes.  LYMPH NODES: No lymphadenopathy.    ABDOMINAL WALL:  Postsurgical changes.  Small bilateral fat-containing inguinal hernias.    BONES:  Patchy sclerosis and osteolysis throughout the bilateral sacrum with   pathologic fractures.There is soft tissue with stranding extending   throughout the presacral and posterior extraperitoneal pelvic soft   tissues.  There are a few bubbles of air/gas at the right sacral pathologic   fracture.    Partially imaged left hip arthroplasty.    Degenerative changes.    IMPRESSION:    Pathologic fractures of the bilateral sacrum with mottled   sclerosis/osteolysis, presacral/posterior pelvic extraperitoneal soft   tissue stranding and small bubbles of gas/air; findings suggestive of   infection/osteomyelitis.    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    Low-density lesion right hepatic lobe, which is poorly characterized on   this noncontrast exam.  Recommend further evaluation with MRI.    Above findings were discussed with Dr. Harrington at the time of interpretation   on 2024    Bladder wall thickening, correlate for cystitis.    Small left-sided pleural effusion with small loculated component.  Partial left lower lobe atelectasis.    Other findings as discussed above.    --- End of Report ---            RADU MITTAL MD; Attending Radiologist  This document has been electronically signed. 2024  8:59AM    < end of copied text >   [facility], Division of Infectious Diseases  WANG Mccoy S. Shah, Y. Patel, G. [facility]  [phone]    JAN CALVIN  69y, Male  [id number]    HPI--  HPI:  Patient is a 70yo M, PMH DM, HTN, HLD, h/o kidney transplant, hypothyroidism, presents to ED from [facility] for AMS. Patient is lethargic and unable to provide history at this time. Information obtained from transfer record and chart.   Patient received Zosyn and Azithromycin - for PNA per transfer record.  UA positive for UTI  pan scan revealed:  Small left pleural effusion with small loculated components  Small to moderate pericardial effusion.  Approximately 5.5 cm low-density lesion anterior right hepatic lobe.  Atrophic right kidney.  Absent left kidney.  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  Colonic fecal retention  Stercoral proctitis.  Patchy sclerosis and osteolysis throughout the bilateral sacrum with   pathologic fractures.There is soft tissue with stranding extending   throughout the presacral and posterior extraperitoneal pelvic soft   tissues.  There are a few bubbles of air/gas at the right sacral pathologic   fracture, findings suggestive of   infection/osteomyelitis.   (2024 12:47)        Active Medications--  acetaminophen     Tablet .. 650 milliGRAM(s) Oral every 6 hours PRN  aluminum hydroxide/magnesium hydroxide/simethicone Suspension 30 milliLiter(s) Oral every 4 hours PRN  amLODIPine   Tablet 10 milliGRAM(s) Oral daily  ascorbic acid 500 milliGRAM(s) Oral two times a day  aspirin enteric coated 81 milliGRAM(s) Oral daily  azaTHIOprine 50 milliGRAM(s) Oral two times a day  buPROPion XL (24-Hour) . 300 milliGRAM(s) Oral daily  cholecalciferol 1000 Unit(s) Oral daily  dextrose 5%. 1000 milliLiter(s) IV Continuous <Continuous>  dextrose 5%. 1000 milliLiter(s) IV Continuous <Continuous>  dextrose 50% Injectable 25 Gram(s) IV Push once  dextrose 50% Injectable 12.5 Gram(s) IV Push once  dextrose 50% Injectable 25 Gram(s) IV Push once  dextrose Oral Gel 15 Gram(s) Oral once PRN  escitalopram 10 milliGRAM(s) Oral <User Schedule>  ferrous    sulfate 325 milliGRAM(s) Oral two times a day  glucagon  Injectable 1 milliGRAM(s) IntraMuscular once  heparin   Injectable 5000 Unit(s) SubCutaneous every 8 hours  hydrALAZINE 100 milliGRAM(s) Oral three times a day  insulin glargine Injectable (LANTUS) 17 Unit(s) SubCutaneous at bedtime  insulin lispro (ADMELOG) corrective regimen sliding scale   SubCutaneous three times a day before meals  levothyroxine 88 MICROGram(s) Oral <User Schedule>  lisinopril 20 milliGRAM(s) Oral daily  melatonin 3 milliGRAM(s) Oral at bedtime PRN  meropenem  IVPB 1000 milliGRAM(s) IV Intermittent every 8 hours  metoprolol tartrate 50 milliGRAM(s) Oral two times a day  ondansetron Injectable 4 milliGRAM(s) IV Push every 8 hours PRN  polyethylene glycol 3350 17 Gram(s) Oral daily  pyridoxine 50 milliGRAM(s) Oral daily  rosuvastatin 10 milliGRAM(s) Oral at bedtime  senna 2 Tablet(s) Oral at bedtime  sodium chloride 0.9% Bolus 500 milliLiter(s) IV Bolus once  sodium chloride 0.9%. 1000 milliLiter(s) IV Continuous <Continuous>  tacrolimus 2 milliGRAM(s) Oral daily  tacrolimus 1 milliGRAM(s) Oral at bedtime  vancomycin  IVPB 1250 milliGRAM(s) IV Intermittent every 12 hours    Antimicrobials:   meropenem  IVPB 1000 milliGRAM(s) IV Intermittent every 8 hours  vancomycin  IVPB 1250 milliGRAM(s) IV Intermittent every 12 hours    Immunologic: azaTHIOprine 50 milliGRAM(s) Oral two times a day  tacrolimus 2 milliGRAM(s) Oral daily  tacrolimus 1 milliGRAM(s) Oral at bedtime      ROS:  unable to obtain    Allergies: No Known Allergies    PMH -- Diabetes Mellitus Type II    ESRD on Dialysis    GERD (gastroesophageal reflux disease)    Depression    Hypothyroidism    Hyperlipidemia    Kidney transplanted    Hypertension    ANGELIKA (obstructive sleep apnea)    Peripheral neuropathy    Shoulder fracture      PSH -- History of colonoscopy    S/P kidney transplant    S/P cholecystectomy    Retroperitoneal fibrosis    AV fistula    S/P right cataract extraction    S/P left cataract extraction    H/O unilateral nephrectomy      FH -- MI (myocardial infarction)    Family history of obesity (Sibling)      Social History --  EtOH: denies   Tobacco: denies   Drug Use: denies     Travel/Environmental/Occupational History:    Physical Exam--  Vital Signs Last 24 Hrs  T(F): 103 (2024 16:21), Max: 103.2 (2024 07:23)  HR: 82 (2024 16:21) (66 - 101)  BP: 196/81 (2024 16:21) (151/69 - 202/55)  RR: 18 (2024 10:34) (16 - 18)  SpO2: 97% (2024 16:21) (94% - 98%)  General: nontoxic-appearing, no acute distress  HEENT: NC/AT, EOMI,  Lungs: Clear bilaterally without rales, wheezing or rhonchi  Heart: Regular rate and rhythm. No murmur, rub or gallop.  Abdomen: Soft. Nondistended. Nontender. No costovertebral angle tenderness.  Extremities: No cyanosis or clubbing. No edema.   Skin: Warm. Dry. Good turgor.     Laboratory & Imaging Data:  CBC:                       9.2    5.07  )-----------( 485      ( 2024 07:15 )             28.4     CMP: 11-    140  |  102  |  22  ----------------------------<  229[H]  3.8   |  30  |  0.98    Ca    12.2[H]      2024 07:38    TPro  7.6  /  Alb  2.4[L]  /  TBili  0.9  /  DBili  x   /  AST  47[H]  /  ALT  44  /  AlkPhos  116  11-29    LIVER FUNCTIONS - ( 2024 07:38 )  Alb: 2.4 g/dL / Pro: 7.6 g/dL / ALK PHOS: 116 U/L / ALT: 44 U/L / AST: 47 U/L / GGT: x           Urinalysis Basic - ( 2024 11:00 )    Color: Yellow / Appearance: Cloudy / S.025 / pH: x  Gluc: x / Ketone: Negative mg/dL  / Bili: Negative / Urobili: 1.0 mg/dL   Blood: x / Protein: 30 mg/dL / Nitrite: Positive   Leuk Esterase: Moderate / RBC: 2 /HPF / WBC 67 /HPF   Sq Epi: x / Non Sq Epi: x / Bacteria: Few /HPF        Microbiology: reviewed    Urinalysis with Rflx Culture (collected 24 @ 11:00)          Radiology: reviewed    < from: CT Abdomen and Pelvis No Cont (24 @ 07:53) >    ACC: [id number] EXAM:  CT ABDOMEN AND PELVIS   ORDERED BY: LILIBETH HORTA     ACC: [id number] EXAM:  CT CHEST   ORDERED BY: LILIBETH HORTA     PROCEDURE DATE:  2024          INTERPRETATION:  CLINICAL INFORMATION: Sepsis.    COMPARISON: CT scan chest abdomen and pelvis 2023    CONTRAST/COMPLICATIONS:  IV Contrast: NONE  Oral Contrast: NONE      PROCEDURE:  CT of the Chest, Abdomen and Pelvis was performed.  Sagittal and coronal reformats were performed.    FINDINGS:    CHEST:    LUNGS AND LARGE AIRWAYS: PLEURA:    Small left pleural effusion with small loculated components. Aspect of   the fissure.  Partial left lower lobe atelectasis.    The central airways are patent.    VESSELS: Atherosclerotic changes thoracic aorta and coronary artery   calcification.    HEART:   Cardiomegaly.  Small to moderate pericardial effusion.    Small hiatal hernia.    MEDIASTINUM AND PATRICE: No lymphadenopathy.    CHEST WALL AND LOWER NECK:  Bilateral gynecomastia.    ABDOMEN AND PELVIS:    Streak artifact degrades image quality.    Evaluation of the solid organ parenchyma is limited without intravenous   contrast.    LIVER:  Approximately 5.5 cm low-density lesion anterior right hepatic lobe.  BILE DUCTS: Probable mild intrahepatic biliary ductal prominence.  GALLBLADDER: Prior cholecystectomy.  SPLEEN: Splenomegaly.  PANCREAS: Within normal limits.  ADRENALS: Within normal limits.  KIDNEYS/URETERS:  Atrophic right kidney.  Absent left kidney.  Right pelvic transplant kidney with mild fullness of the pelvicalyceal   system.    Metallic streak artifact related to patient's left hip arthroplasty   degrades image quality limiting evaluation of the pelvis.    BLADDER: Bladder wall thickening, correlate for cystitis.  REPRODUCTIVE ORGANS: Limited.    BOWEL:   There is colonic fecal retention, without bowel obstruction.  There is rectal wall thickening with perirectal and presacral   inflammatory stranding/fluid.  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  No extraluminal gas/air or drainable fluid collection is noted.  Appendix is normal.  PERITONEUM/RETROPERITONEUM:  Confluent soft tissue within the periaortic and aortocaval   retroperitoneum, similar to prior exam compatible with known   retroperitoneal fibrosis.    VESSELS: Atherosclerotic changes.  LYMPH NODES: No lymphadenopathy.    ABDOMINAL WALL:  Postsurgical changes.  Small bilateral fat-containing inguinal hernias.    BONES:  Patchy sclerosis and osteolysis throughout the bilateral sacrum with   pathologic fractures.There is soft tissue with stranding extending   throughout the presacral and posterior extraperitoneal pelvic soft   tissues.  There are a few bubbles of air/gas at the right sacral pathologic   fracture.    Partially imaged left hip arthroplasty.    Degenerative changes.    IMPRESSION:    Pathologic fractures of the bilateral sacrum with mottled   sclerosis/osteolysis, presacral/posterior pelvic extraperitoneal soft   tissue stranding and small bubbles of gas/air; findings suggestive of   infection/osteomyelitis.    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    Low-density lesion right hepatic lobe, which is poorly characterized on   this noncontrast exam.  Recommend further evaluation with MRI.    Above findings were discussed with Dr. Harrington at the time of interpretation   on 2024    Bladder wall thickening, correlate for cystitis.    Small left-sided pleural effusion with small loculated component.  Partial left lower lobe atelectasis.    Other findings as discussed above.    --- End of Report ---            RADU MITTAL MD; Attending Radiologist  This document has been electronically signed. 2024  8:59AM    < end of copied text >

## 2024-11-29 NOTE — ED PROVIDER NOTE - CARE PLAN
Principal Discharge DX:	Sepsis  Secondary Diagnosis:	Osteomyelitis  Secondary Diagnosis:	Cystitis   1

## 2024-11-29 NOTE — ED ADULT NURSE NOTE - NSICDXFAMILYHX_GEN_ALL_CORE_FT
FAMILY HISTORY:  MI (myocardial infarction)    Sibling  Still living? No  Family history of obesity, Age at diagnosis: Age Unknown     Statement Selected

## 2024-11-29 NOTE — H&P ADULT - ASSESSMENT
Patient is a 70yo M, PMH DM, HTN, HLD, h/o kidney transplant, hypothyroidism, presents to ED from [facility] for AMS. Patient is lethargic and unable to provide history at this time.    UA positive for UTI  pan scan revealed:  Small left pleural effusion with small loculated components  Small to moderate pericardial effusion.  Approximately 5.5 cm low-density lesion anterior right hepatic lobe.  Atrophic right kidney.  Absent left kidney.  Right pelvic transplant kidney with mild fullness of the pelvic alyceal system.  Colonic fecal retention  Stercoral proctitis.  Patchy sclerosis and osteolysis throughout the bilateral sacrum with   pathologic fractures. There is soft tissue with stranding extending   throughout the presacral and posterior extraperitoneal pelvic soft   tissues.  There are a few bubbles of air/gas at the right sacral pathologic   fracture, findings suggestive of   infection/osteomyelitis.    Sepsis 2/2 multiple infections (UTI, sacral infection, possible osteomyelitis)       Patient is a 68yo M, PMH DM, HTN, HLD, h/o kidney transplant, hypothyroidism, presents to ED from [facility] for AMS. Patient is lethargic and unable to provide history at this time.    UA positive for UTI  pan scan revealed:  Small left pleural effusion with small loculated components  Small to moderate pericardial effusion.  Approximately 5.5 cm low-density lesion anterior right hepatic lobe.  Atrophic right kidney.  Absent left kidney.  Right pelvic transplant kidney with mild fullness of the pelvic alyceal system.  Colonic fecal retention  Stercoral proctitis.  Patchy sclerosis and osteolysis throughout the bilateral sacrum with   pathologic fractures. There is soft tissue with stranding extending   throughout the presacral and posterior extraperitoneal pelvic soft   tissues.  There are a few bubbles of air/gas at the right sacral pathologic   fracture, findings suggestive of   infection/osteomyelitis.    AMS 2/2 Sepsis 2/2 multiple infections (UTI, sacral infection, possible osteomyelitis)  Admit to Medicine  Started on Vanc and Meropenem for multiple infections  NS 150cc/hr  Tylenol PRN pain and fever  continue home meds  f/u surgery recs re: sacral infection/poss osteo  f/u ortho recs re: sacral pathologic fractures  f/u cardio recs re: pericardial effusion  f/u ID recs   NPO for now  advance diet as tolerated/mentation allows  aspiration and fall risk  remote tele  f/u blood cultures  f/u urine cultures  Increased senna and added miralax for fecal retention, may need enema  zofran PRN n/v    Hepatic lesion  CT abd with 5.5cm lesion on R hepatic lobe  f/u MR abdomen to further evaluate    Diabetes Mellitus  NPO for now  holding home meal time novolog 10u TID-AC  Decreased nightly Lantus to 17u QHS (from home dose 35u, increase as tolerated according to fingerstick)  fingerstick glucose monitoring Q6h  ISS  A1c in AM    HTN  Continue Lisinopril 20mg daily with hold parameters  Continue Hydralazine 100mg TID with hold parameters  Continue Metoprolol tartrate 50mg BID with hold parameters    HLD  Continue Crestor 10mg daily    s/p Kidney transplant  Continue Tacrolimus 2mg daily  Continue Tacrolimus 1mg QHS  Continue Azathioprine 50mg BID    Hypothyroidism  Continue Levothyroxine 88mcg QAM    Depression  Continue Escitalopram 10mg TID       Patient is a 70yo M, PMH DM, HTN, HLD, h/o kidney transplant, hypothyroidism, presents to ED from [facility] for AMS. Patient is lethargic and unable to provide history at this time.    UA positive for UTI  pan scan revealed:  Small left pleural effusion with small loculated components  Small to moderate pericardial effusion.  Approximately 5.5 cm low-density lesion anterior right hepatic lobe.  Atrophic right kidney.  Absent left kidney.  Right pelvic transplant kidney with mild fullness of the pelvic alyceal system.  Colonic fecal retention  Stercoral proctitis.  Patchy sclerosis and osteolysis throughout the bilateral sacrum with   pathologic fractures. There is soft tissue with stranding extending   throughout the presacral and posterior extraperitoneal pelvic soft   tissues.  There are a few bubbles of air/gas at the right sacral pathologic   fracture, findings suggestive of   infection/osteomyelitis.    AMS 2/2 Sepsis 2/2 multiple infections (UTI, sacral infection, possible osteomyelitis)  Admit to Medicine  Started on Vanc and Meropenem for multiple infections  NS 150cc/hr  Tylenol PRN pain and fever  continue home meds  f/u surgery recs re: sacral infection/poss osteo  f/u ortho recs re: sacral pathologic fractures  f/u cardio recs re: pericardial effusion  f/u ID recs   NPO for now  advance diet as tolerated/mentation allows  aspiration and fall risk  remote tele  f/u blood cultures  f/u urine cultures  Increased senna and added miralax for fecal retention, may need enema  zofran PRN n/v    Hepatic lesion  CT abd with 5.5cm lesion on R hepatic lobe  f/u MR abdomen to further evaluate    Diabetes Mellitus  NPO for now  holding home meal time novolog 10u TID-AC  Decreased nightly Lantus to 17u QHS (from home dose 35u, increase as tolerated according to fingerstick)  fingerstick glucose monitoring Q6h  ISS  A1c in AM    HTN  Continue Lisinopril 20mg daily with hold parameters  Continue Hydralazine 100mg TID with hold parameters  Continue Metoprolol tartrate 50mg BID with hold parameters  Continue Amlodipine 10mg daily    HLD  Continue Crestor 10mg daily    s/p Kidney transplant  Continue Tacrolimus 2mg daily  Continue Tacrolimus 1mg QHS  Continue Azathioprine 50mg BID    Hypothyroidism  Continue Levothyroxine 88mcg QAM    Depression  Continue Escitalopram 10mg TID  Continue Bupropion 300mg daily    DVT ppx: Heparin Q8h  Dispo: DC planning pending hospital course    Time spent 90 min           Patient is a 68yo M, PMH DM, HTN, HLD, h/o kidney transplant, hypothyroidism, presents to ED from [facility] for AMS. Patient is lethargic and unable to provide history at this time.    UA positive for UTI  pan scan revealed:  Small left pleural effusion with small loculated components  Small to moderate pericardial effusion.  Approximately 5.5 cm low-density lesion anterior right hepatic lobe.  Atrophic right kidney.  Absent left kidney.  Right pelvic transplant kidney with mild fullness of the pelvic alyceal system.  Colonic fecal retention  Stercoral proctitis.  Patchy sclerosis and osteolysis throughout the bilateral sacrum with   pathologic fractures. There is soft tissue with stranding extending   throughout the presacral and posterior extraperitoneal pelvic soft   tissues.  There are a few bubbles of air/gas at the right sacral pathologic   fracture, findings suggestive of   infection/osteomyelitis.    AMS 2/2 Sepsis 2/2 multiple infections (UTI, sacral infection, possible osteomyelitis)  Admit to Medicine  Started on Vanc and Meropenem for multiple infections  NS 150cc/hr  Tylenol PRN pain and fever  continue home meds  f/u surgery recs re: sacral infection/poss osteo  f/u ortho recs re: sacral pathologic fractures  f/u cardio recs re: pericardial effusion  f/u ID recs   NPO for now  advance diet as tolerated/mentation allows  aspiration and fall risk  remote tele  f/u blood cultures  f/u urine cultures  Increased senna and added miralax for fecal retention, may need enema  zofran PRN n/v  MR pelvis/sacrum to eval for osteomyelitis, may need bone culture to further eval    Hepatic lesion  CT abd with 5.5cm lesion on R hepatic lobe  f/u MR abdomen to further evaluate    Diabetes Mellitus  NPO for now  holding home meal time novolog 10u TID-AC  Decreased nightly Lantus to 17u QHS (from home dose 35u, increase as tolerated according to fingerstick)  fingerstick glucose monitoring Q6h  ISS  A1c in AM    HTN  Continue Lisinopril 20mg daily with hold parameters  Continue Hydralazine 100mg TID with hold parameters  Continue Metoprolol tartrate 50mg BID with hold parameters  Continue Amlodipine 10mg daily    HLD  Continue Crestor 10mg daily    s/p Kidney transplant  Continue Tacrolimus 2mg daily  Continue Tacrolimus 1mg QHS  Continue Azathioprine 50mg BID    Hypothyroidism  Continue Levothyroxine 88mcg QAM    Depression  Continue Escitalopram 10mg TID  Continue Bupropion 300mg daily    DVT ppx: Heparin Q8h  Dispo: DC planning pending hospital course    Time spent 90 min

## 2024-11-29 NOTE — ED PROVIDER NOTE - NSICDXFAMILYHX_GEN_ALL_CORE_FT
FAMILY HISTORY:  MI (myocardial infarction)    Sibling  Still living? No  Family history of obesity, Age at diagnosis: Age Unknown

## 2024-11-29 NOTE — CHART NOTE - NSCHARTNOTEFT_GEN_A_CORE
XR Sacrum/Coccyx and 3D reconstruction of sacrum from CT scan completed. Reviewed with attending Dr. Bunn. No H or U type fracture visualized. No acute orthopedic surgical intervention at this time. Patient to be WBAT, recommend pain control, PT/OT while in house. Patient can follow up with Dr. Bunn in his outpatient clinic after discharge. Discussed with Dr. Bunn who is in agreement with the above plan. Ortho to sign off, please reconsult us with any clinical concerns.

## 2024-11-29 NOTE — CONSULT NOTE ADULT - SUBJECTIVE AND OBJECTIVE BOX
[facility] - Tom Rahman, Anna, Osorio, Lisa, Alexis, Adria; Office Number: [phone]    Initial Consult Note  CHIEF COMPLAINT: Patient is a 69y old  Male who presents with a chief complaint of AMS (2024 12:47)    HPI: 70yo M, PMH DM, HTN, HLD, h/o kidney transplant, hypothyroidism, presents to ED from [facility] for AMS. Patient is lethargic and unable to provide history at this time. As per records patient received Zosyn and Azithromycin - for PNA. Pt was seen in ER non 13 for anemia with hb 6.7. Pt was at [facility] last month s/p fall and was admitted to [facility] for rehab, states pt was noted to be anemic while at [facility] and had blood transfusion and had full anemia workup including scans and colonoscopy, told to have mass of liver - biopsy of liver mass benign. Hgb from [facility] on 10/24/24 upon discharge to [facility] noted to 7.2.     ,In ED, T(F): 100.1, HR:  (66 - 101), BP:  (151/69 - 202/55), RR: 18, SpO2:  (94% - 98%). UA positive for UTI. Pan scan revealed: Small left pleural effusion with small loculated components, Small to moderate pericardial effusion. Approximately 5.5 cm low-density lesion anterior right hepatic lobe. Atrophic right kidney. Absent left kidney. 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 Colonic fecal retention. Stercoral proctitis. Patchy sclerosis and osteolysis throughout the bilateral sacrum with pathologic fractures. There is soft tissue with stranding extending   throughout the presacral and posterior extraperitoneal pelvic soft tissues.  There are a few bubbles of air/gas at the right sacral pathologic fracture, findings suggestive of infection/osteomyelitis. Labs remarkable for WBC 5.07, Hb 9.2,HCT 28.4, Na 140, K 3.8, BUN 22, Creat 0.98, AST 47, ALT 44, Alk phos 116, 28.8, Lactate 1.8.  cardiology called for pericardial effusion.     Allergies    No Known Allergies    Intolerances      PAST MEDICAL & SURGICAL HISTORY:  Diabetes Mellitus Type II  Insulin pump ()      GERD (gastroesophageal reflux disease)      Depression      Hypothyroidism      Hypothyroidism      Hyperlipidemia      Kidney transplanted        Hypertension      Hypertension      ANGELIKA (obstructive sleep apnea)      Peripheral neuropathy      Shoulder fracture  Left.      History of colonoscopy      S/P kidney transplant        S/P cholecystectomy      Retroperitoneal fibrosis  s/p surgery      AV fistula  Right arm      S/P right cataract extraction      S/P left cataract extraction      H/O unilateral nephrectomy  Left        MEDICATIONS  (STANDING):  amLODIPine   Tablet 10 milliGRAM(s) Oral daily  ascorbic acid 500 milliGRAM(s) Oral two times a day  aspirin enteric coated 81 milliGRAM(s) Oral daily  azaTHIOprine 50 milliGRAM(s) Oral two times a day  buPROPion XL (24-Hour) . 300 milliGRAM(s) Oral daily  cholecalciferol 1000 Unit(s) Oral daily  dextrose 5%. 1000 milliLiter(s) (50 mL/Hr) IV Continuous <Continuous>  dextrose 5%. 1000 milliLiter(s) (100 mL/Hr) IV Continuous <Continuous>  dextrose 50% Injectable 25 Gram(s) IV Push once  dextrose 50% Injectable 12.5 Gram(s) IV Push once  dextrose 50% Injectable 25 Gram(s) IV Push once  escitalopram 10 milliGRAM(s) Oral <User Schedule>  ferrous    sulfate 325 milliGRAM(s) Oral two times a day  glucagon  Injectable 1 milliGRAM(s) IntraMuscular once  hydrALAZINE 100 milliGRAM(s) Oral three times a day  insulin glargine Injectable (LANTUS) 17 Unit(s) SubCutaneous at bedtime  insulin lispro (ADMELOG) corrective regimen sliding scale   SubCutaneous three times a day before meals  levothyroxine 88 MICROGram(s) Oral <User Schedule>  lisinopril 20 milliGRAM(s) Oral daily  meropenem  IVPB 1000 milliGRAM(s) IV Intermittent every 8 hours  metoprolol tartrate 50 milliGRAM(s) Oral two times a day  polyethylene glycol 3350 17 Gram(s) Oral daily  pyridoxine 50 milliGRAM(s) Oral daily  rosuvastatin 10 milliGRAM(s) Oral at bedtime  senna 2 Tablet(s) Oral at bedtime  sodium chloride 0.9% Bolus 500 milliLiter(s) IV Bolus once  sodium chloride 0.9%. 1000 milliLiter(s) (150 mL/Hr) IV Continuous <Continuous>  tacrolimus 2 milliGRAM(s) Oral daily  tacrolimus 1 milliGRAM(s) Oral at bedtime  vancomycin  IVPB 1250 milliGRAM(s) IV Intermittent every 12 hours    MEDICATIONS  (PRN):  acetaminophen     Tablet .. 650 milliGRAM(s) Oral every 6 hours PRN Temp greater or equal to 38C (100.4F), Mild Pain (1 - 3)  aluminum hydroxide/magnesium hydroxide/simethicone Suspension 30 milliLiter(s) Oral every 4 hours PRN Dyspepsia  dextrose Oral Gel 15 Gram(s) Oral once PRN Blood Glucose LESS THAN 70 milliGRAM(s)/deciliter  melatonin 3 milliGRAM(s) Oral at bedtime PRN Insomnia  ondansetron Injectable 4 milliGRAM(s) IV Push every 8 hours PRN Nausea and/or Vomiting    FAMILY HISTORY:  MI (myocardial infarction)    Family history of obesity (Sibling)       No family history of acute MI or sudden cardiac death.    SOCIAL HISTORY: No active tobacco, ethanol, or drug abuse.    REVIEW OF SYSTEMS   All other review of systems is negative unless indicated above.    VITAL SIGNS:   Vital Signs Last 24 Hrs  T(C): 37.8 (2024 10:34), Max: 39.6 (2024 07:23)  T(F): 100.1 (2024 10:34), Max: 103.2 (2024 07:23)  HR: 66 (2024 10:34) (66 - 101)  BP: 173/74 (2024 10:34) (151/69 - 202/55)  BP(mean): --  RR: 18 (2024 10:34) (16 - 18)  SpO2: 96% (2024 10:34) (94% - 98%)    Parameters below as of 2024 10:34  Patient On (Oxygen Delivery Method): room air        Physical Exam:  Constitutional: NAD, awake and alert  HEENT: Moist Mucous Membranes, Anicteric  Pulmonary: Non-labored, breath sounds are clear bilaterally, No wheezing, rales or rhonchi  Cardiovascular: Regular, S1 and S2, No murmurs, No rubs, gallops or clicks  Gastrointestinal: Bowel Sounds present, soft, nontender.   Lymph: No peripheral edema. No lymphadenopathy.  Skin: No visible rashes or ulcers.  Psych:  Mood & affect appropriate    I&O's Summary      LABS: All Labs Reviewed:                        9.2    5.07  )-----------( 485      ( 2024 07:15 )             28.4     2024 07:38    140    |  102    |  22     ----------------------------<  229    3.8     |  30     |  0.98     Ca    12.2       2024 07:38    TPro  7.6    /  Alb  2.4    /  TBili  0.9    /  DBili  x      /  AST  47     /  ALT  44     /  AlkPhos  116    2024 07:38    PT/INR - ( 2024 07:15 )   PT: 13.2 sec;   INR: 1.13 ratio         PTT - ( 2024 07:15 )  PTT:28.8 sec        EXAM:  ECHO TRANSTHORACIC COMP W DOPP      PROCEDURE DATE:  Dec 20 2018   .      INTERPRETATION:  REPORT:    TRANSTHORACIC ECHOCARDIOGRAM REPORT         Patient Name:   JAN CALVIN Patient Location: [facility] Rec #:  [id number] Accession #:      [id number]  Account #:      [id number]          Height:           72.0 in 182.9 cm  YOB: 1955        Weight:           218.0 lb 98.88 kg  Patient Age:    63 years          BSA:              2.21 m²  Patient Gender: M              BP:               126/60 mmHg       Date of Exam:        2018 7:38:45 PM  Sonographer:         Fish Dee  Referring Physician: Patricio Huang MD    Procedure:     2D Echo/Doppler/Color Doppler Complete.  Indications:   Chest pain, unspecified - R07.9  Diagnosis:     Chest pain, unspecified - R07.9  Study Details: Technically difficult study. Study quality was adversely   affected                 due to patient obesity.         2D AND M-MODE MEASUREMENTS (normal ranges within parentheses):  Left                Normal    Aorta/Left           Normal  Ventricle:                    Atrium:  IVSd (2D):    1.02  (0.7-1.1) Left Atrium  3.44 cm (1.9-4.0)                cm              (2D):  LVPWd (2D):   0.97  (0.7-1.1) LA Volume    30.7                cm              Index        ml/m²  LVIDd (2D):   5.39  (3.4-5.7) Right Ventricle:                cm              TAPSE:           2.50 cm  LVIDs (2D):   3.31                cm  LV FS (2D):   38.5  (>25%)                %  LVEF (2D):   68 %  (>55%)  Relative Wall 0.36  (<0.42)  Thickness    LV SYSTOLIC FUNCTION BY 2D PLANIMETRY (MOD):  EF-A4C View: 70.9 % EF-A2C View: 73.3 % EF-Biplane: 72 %    LV DIASTOLIC FUNCTION:  MV Peak E: 0.66 m/s e', MV Gina: 0.09 m/s  MV Peak A: 0.70 m/s E/e' Ratio: 7.64  E/A Ratio: 0.95     Decel Time: 236 msec    SPECTRAL DOPPLER ANALYSIS (where applicable):  Mitral Valve:  MV P1/2 Time: 68.32 msec  MV Area, PHT: 3.22 cm²    Aortic Valve: AoV Max Christiano: 1.43 m/s AoV Peak P.2 mmHg AoV MeanP.5 mmHg    LVOT Vmax: 1.11 m/s LVOT VTI: 0.314 m LVOT Diameter: 2.38 cm    AoV Area, Vmax: 3.47 cm² AoV Area, VTI: 4.36 cm² AoV Area, Vmn:  Ao VTI: 0.322  Tricuspid Valve and PA/RV Systolic Pressure: TR Max Velocity:  RA   Pressure: 8 mmHg RVSP/PASP:    Pulmonic Valve:  PV Max Velocity: 1.08 m/s PV Max P.7 mmHg PV Mean PG:       PHYSICIAN INTERPRETATION:  Left Ventricle: Endocardial visualization was enhanced with intravenous   echo contrast. The left ventricular internal cavity size is normal. Left   ventricular wall thickness is normal.  Global LV systolic function was normal. Left ventricular ejection   fraction, by visual estimation, is 65 to 70%. Normal segmental left   ventricular systolic function. Spectral Doppler shows impaired relaxation   pattern of left ventricular myocardial filling (Grade I diastolic   dysfunction).  Right Ventricle: Normal right ventricular size and function. TV S' 0.2   m/s.  Left Atrium: The left atrium is normal in size.  Right Atrium: The right atrium is normal in size.  Pericardium: There is no evidence of pericardial effusion. There is a   significant pericardial fat pad present. There is a moderate pleural   effusion in the left lateral region.  Mitral Valve: The mitral valve is normalin structure. Mitral leaflet   mobility is normal. There is mild mitral annular calcification. No   evidence of mitral valve regurgitation is seen.  Tricuspid Valve: Structurally normal tricuspid valve, with normal leaflet   excursion.  Aortic Valve:The aortic valve was not well visualized. The aortic valve   is trileaflet. Peak Av velocity is 1.43 m/s, mean transaortic gradient   equals 4.5 mmHg, the calculated aortic valve area equals 4.36 cm² by the   continuity equation consistent with normally opening aortic valve. No   evidence of aortic valve regurgitation is seen.  Pulmonic Valve: The pulmonic valve was not well visualized.  Aorta: The aortic root is normal in size and structure. There is mild   aortic root calcification.  Pulmonary Artery: The pulmonary artery is not well seen.  Venous: The pulmonary veins appear normal. The inferior vena cava was   normal sized, with respiratory size variation less than 50%.       Summary:   1. Left ventricular ejection fraction, by visual estimation, is 65 to   70%.   2. Normal global left ventricular systolic function.   3. Normal left ventricular internal cavity size.   4. Spectral Doppler shows impaired relaxation pattern of left   ventricular myocardial filling (Grade I diastolic dysfunction).   5. Normal right ventricular size and function.   6. The left atrium is normal in size.   7. The right atrium is normal in size.   8. Moderate pleural effusion in the left lateral region.   9. There is a significant pericardial fat pad present.  10. There is no evidence of pericardial effusion.  11. Mild mitral annular calcification.  12. Structurally normal tricuspid valve, with normal leaflet excursion.  13. The pulmonary veins appear normal.  14. Endocardial visualization was enhancedwith intravenous echo contrast.  15. There is mild aortic root calcification.    [id number] Kuldeep Mitchell MD, Electronically signed on 2018 at 12:24:15 PM              *** Final ***                  KULDEEP MITCHELL M.D.  This document has been electronically signed. Dec 20 2018  7:38PM           [facility] - Tom Rahman, Anna, Osorio, Lisa, Alexis, Adria; Office Number: [phone]    Initial Consult Note  CHIEF COMPLAINT: Patient is a 69y old  Male who presents with a chief complaint of AMS (2024 12:47)    HPI: 70yo M, PMH DM, HTN, HLD, h/o kidney transplant, hypothyroidism, presents to ED from [facility] for AMS. Patient is lethargic and unable to provide history at this time. As per records patient received Zosyn and Azithromycin - for PNA. Pt was seen in ER non 13 for anemia with hb 6.7. Pt was at [facility] last month s/p fall and was admitted to [facility] for rehab, states pt was noted to be anemic while at [facility] and had blood transfusion and had full anemia workup including scans and colonoscopy, told to have mass of liver - biopsy of liver mass benign. Hgb from [facility] on 10/24/24 upon discharge to [facility] noted to 7.2.     ,In ED, T(F): 100.1, HR:  (66 - 101), BP:  (151/69 - 202/55), RR: 18, SpO2:  (94% - 98%). UA positive for UTI. Pan scan revealed: Small left pleural effusion with small loculated components, Small to moderate pericardial effusion. Approximately 5.5 cm low-density lesion anterior right hepatic lobe. Atrophic right kidney. Absent left kidney. 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 Colonic fecal retention. Stercoral proctitis. Patchy sclerosis and osteolysis throughout the bilateral sacrum with pathologic fractures. There is soft tissue with stranding extending   throughout the presacral and posterior extraperitoneal pelvic soft tissues.  There are a few bubbles of air/gas at the right sacral pathologic fracture, findings suggestive of infection/osteomyelitis. Labs remarkable for WBC 5.07, Hb 9.2,HCT 28.4, Na 140, K 3.8, BUN 22, Creat 0.98, AST 47, ALT 44, Alk phos 116, 28.8, Lactate 1.8.  cardiology called for pericardial effusion.     Allergies    No Known Allergies    PAST MEDICAL & SURGICAL HISTORY:  Diabetes Mellitus Type II  Insulin pump ()      GERD (gastroesophageal reflux disease)      Depression      Hypothyroidism      Hypothyroidism      Hyperlipidemia      Kidney transplanted        Hypertension      Hypertension      ANGELIKA (obstructive sleep apnea)      Peripheral neuropathy      Shoulder fracture  Left.      History of colonoscopy      S/P kidney transplant        S/P cholecystectomy      Retroperitoneal fibrosis  s/p surgery      AV fistula  Right arm      S/P right cataract extraction      S/P left cataract extraction      H/O unilateral nephrectomy  Left        MEDICATIONS  (STANDING):  amLODIPine   Tablet 10 milliGRAM(s) Oral daily  ascorbic acid 500 milliGRAM(s) Oral two times a day  aspirin enteric coated 81 milliGRAM(s) Oral daily  azaTHIOprine 50 milliGRAM(s) Oral two times a day  buPROPion XL (24-Hour) . 300 milliGRAM(s) Oral daily  cholecalciferol 1000 Unit(s) Oral daily  dextrose 5%. 1000 milliLiter(s) (50 mL/Hr) IV Continuous <Continuous>  dextrose 5%. 1000 milliLiter(s) (100 mL/Hr) IV Continuous <Continuous>  dextrose 50% Injectable 25 Gram(s) IV Push once  dextrose 50% Injectable 12.5 Gram(s) IV Push once  dextrose 50% Injectable 25 Gram(s) IV Push once  escitalopram 10 milliGRAM(s) Oral <User Schedule>  ferrous    sulfate 325 milliGRAM(s) Oral two times a day  glucagon  Injectable 1 milliGRAM(s) IntraMuscular once  hydrALAZINE 100 milliGRAM(s) Oral three times a day  insulin glargine Injectable (LANTUS) 17 Unit(s) SubCutaneous at bedtime  insulin lispro (ADMELOG) corrective regimen sliding scale   SubCutaneous three times a day before meals  levothyroxine 88 MICROGram(s) Oral <User Schedule>  lisinopril 20 milliGRAM(s) Oral daily  meropenem  IVPB 1000 milliGRAM(s) IV Intermittent every 8 hours  metoprolol tartrate 50 milliGRAM(s) Oral two times a day  polyethylene glycol 3350 17 Gram(s) Oral daily  pyridoxine 50 milliGRAM(s) Oral daily  rosuvastatin 10 milliGRAM(s) Oral at bedtime  senna 2 Tablet(s) Oral at bedtime  sodium chloride 0.9% Bolus 500 milliLiter(s) IV Bolus once  sodium chloride 0.9%. 1000 milliLiter(s) (150 mL/Hr) IV Continuous <Continuous>  tacrolimus 2 milliGRAM(s) Oral daily  tacrolimus 1 milliGRAM(s) Oral at bedtime  vancomycin  IVPB 1250 milliGRAM(s) IV Intermittent every 12 hours    MEDICATIONS  (PRN):  acetaminophen     Tablet .. 650 milliGRAM(s) Oral every 6 hours PRN Temp greater or equal to 38C (100.4F), Mild Pain (1 - 3)  aluminum hydroxide/magnesium hydroxide/simethicone Suspension 30 milliLiter(s) Oral every 4 hours PRN Dyspepsia  dextrose Oral Gel 15 Gram(s) Oral once PRN Blood Glucose LESS THAN 70 milliGRAM(s)/deciliter  melatonin 3 milliGRAM(s) Oral at bedtime PRN Insomnia  ondansetron Injectable 4 milliGRAM(s) IV Push every 8 hours PRN Nausea and/or Vomiting    FAMILY HISTORY:  MI (myocardial infarction)    Family history of obesity (Sibling)       No family history of acute MI or sudden cardiac death.    SOCIAL HISTORY: No active tobacco, ethanol, or drug abuse.    REVIEW OF SYSTEMS   All other review of systems is negative unless indicated above.    VITAL SIGNS:   Vital Signs Last 24 Hrs  T(C): 37.8 (2024 10:34), Max: 39.6 (2024 07:23)  T(F): 100.1 (2024 10:34), Max: 103.2 (2024 07:23)  HR: 66 (2024 10:34) (66 - 101)  BP: 173/74 (2024 10:34) (151/69 - 202/55)  BP(mean): --  RR: 18 (2024 10:34) (16 - 18)  SpO2: 96% (2024 10:34) (94% - 98%)    Parameters below as of 2024 10:34  Patient On (Oxygen Delivery Method): room air        Physical Exam:  Constitutional: NAD, awake and alert  HEENT: Moist Mucous Membranes, Anicteric  Pulmonary: Non-labored, breath sounds are clear bilaterally, No wheezing, rales or rhonchi  Cardiovascular: Regular, S1 and S2, No murmurs, No rubs, gallops or clicks  Gastrointestinal: Bowel Sounds present, soft, nontender.   Lymph: No peripheral edema. No lymphadenopathy.  Skin: No visible rashes or ulcers.  Psych:  Mood & affect appropriate    I&O's Summary      LABS: All Labs Reviewed:                        9.2    5.07  )-----------( 485      ( 2024 07:15 )             28.4     2024 07:38    140    |  102    |  22     ----------------------------<  229    3.8     |  30     |  0.98     Ca    12.2       2024 07:38    TPro  7.6    /  Alb  2.4    /  TBili  0.9    /  DBili  x      /  AST  47     /  ALT  44     /  AlkPhos  116    2024 07:38    PT/INR - ( 2024 07:15 )   PT: 13.2 sec;   INR: 1.13 ratio         PTT - ( 2024 07:15 )  PTT:28.8 sec        EXAM:  ECHO TRANSTHORACIC COMP W DOPP      PROCEDURE DATE:  Dec 20 2018   .      INTERPRETATION:  REPORT:    TRANSTHORACIC ECHOCARDIOGRAM REPORT         Patient Name:   JAN CALVIN Patient Location: [facility] Rec #:  [id number] Accession #:      [id number]  Account #:      [id number]          Height:           72.0 in 182.9 cm  YOB: 1955        Weight:           218.0 lb 98.88 kg  Patient Age:    63 years          BSA:              2.21 m²  Patient Gender: M              BP:               126/60 mmHg       Date of Exam:        2018 7:38:45 PM  Sonographer:         Fish Dee  Referring Physician: Patricio Huang MD    Procedure:     2D Echo/Doppler/Color Doppler Complete.  Indications:   Chest pain, unspecified - R07.9  Diagnosis:     Chest pain, unspecified - R07.9  Study Details: Technically difficult study. Study quality was adversely   affected                 due to patient obesity.         2D AND M-MODE MEASUREMENTS (normal ranges within parentheses):  Left                Normal    Aorta/Left           Normal  Ventricle:                    Atrium:  IVSd (2D):    1.02  (0.7-1.1) Left Atrium  3.44 cm (1.9-4.0)                cm              (2D):  LVPWd (2D):   0.97  (0.7-1.1) LA Volume    30.7                cm              Index        ml/m²  LVIDd (2D):   5.39  (3.4-5.7) Right Ventricle:                cm              TAPSE:           2.50 cm  LVIDs (2D):   3.31                cm  LV FS (2D):   38.5  (>25%)                %  LVEF (2D):   68 %  (>55%)  Relative Wall 0.36  (<0.42)  Thickness    LV SYSTOLIC FUNCTION BY 2D PLANIMETRY (MOD):  EF-A4C View: 70.9 % EF-A2C View: 73.3 % EF-Biplane: 72 %    LV DIASTOLIC FUNCTION:  MV Peak E: 0.66 m/s e', MV Gina: 0.09 m/s  MV Peak A: 0.70 m/s E/e' Ratio: 7.64  E/A Ratio: 0.95     Decel Time: 236 msec    SPECTRAL DOPPLER ANALYSIS (where applicable):  Mitral Valve:  MV P1/2 Time: 68.32 msec  MV Area, PHT: 3.22 cm²    Aortic Valve: AoV Max Christiano: 1.43 m/s AoV Peak P.2 mmHg AoV MeanP.5 mmHg    LVOT Vmax: 1.11 m/s LVOT VTI: 0.314 m LVOT Diameter: 2.38 cm    AoV Area, Vmax: 3.47 cm² AoV Area, VTI: 4.36 cm² AoV Area, Vmn:  Ao VTI: 0.322  Tricuspid Valve and PA/RV Systolic Pressure: TR Max Velocity:  RA   Pressure: 8 mmHg RVSP/PASP:    Pulmonic Valve:  PV Max Velocity: 1.08 m/s PV Max P.7 mmHg PV Mean PG:       PHYSICIAN INTERPRETATION:  Left Ventricle: Endocardial visualization was enhanced with intravenous   echo contrast. The left ventricular internal cavity size is normal. Left   ventricular wall thickness is normal.  Global LV systolic function was normal. Left ventricular ejection   fraction, by visual estimation, is 65 to 70%. Normal segmental left   ventricular systolic function. Spectral Doppler shows impaired relaxation   pattern of left ventricular myocardial filling (Grade I diastolic   dysfunction).  Right Ventricle: Normal right ventricular size and function. TV S' 0.2   m/s.  Left Atrium: The left atrium is normal in size.  Right Atrium: The right atrium is normal in size.  Pericardium: There is no evidence of pericardial effusion. There is a   significant pericardial fat pad present. There is a moderate pleural   effusion in the left lateral region.  Mitral Valve: The mitral valve is normalin structure. Mitral leaflet   mobility is normal. There is mild mitral annular calcification. No   evidence of mitral valve regurgitation is seen.  Tricuspid Valve: Structurally normal tricuspid valve, with normal leaflet   excursion.  Aortic Valve:The aortic valve was not well visualized. The aortic valve   is trileaflet. Peak Av velocity is 1.43 m/s, mean transaortic gradient   equals 4.5 mmHg, the calculated aortic valve area equals 4.36 cm² by the   continuity equation consistent with normally opening aortic valve. No   evidence of aortic valve regurgitation is seen.  Pulmonic Valve: The pulmonic valve was not well visualized.  Aorta: The aortic root is normal in size and structure. There is mild   aortic root calcification.  Pulmonary Artery: The pulmonary artery is not well seen.  Venous: The pulmonary veins appear normal. The inferior vena cava was   normal sized, with respiratory size variation less than 50%.       Summary:   1. Left ventricular ejection fraction, by visual estimation, is 65 to   70%.   2. Normal global left ventricular systolic function.   3. Normal left ventricular internal cavity size.   4. Spectral Doppler shows impaired relaxation pattern of left   ventricular myocardial filling (Grade I diastolic dysfunction).   5. Normal right ventricular size and function.   6. The left atrium is normal in size.   7. The right atrium is normal in size.   8. Moderate pleural effusion in the left lateral region.   9. There is a significant pericardial fat pad present.  10. There is no evidence of pericardial effusion.  11. Mild mitral annular calcification.  12. Structurally normal tricuspid valve, with normal leaflet excursion.  13. The pulmonary veins appear normal.  14. Endocardial visualization was enhancedwith intravenous echo contrast.  15. There is mild aortic root calcification.    [id number] Kuldeep Mitchell MD, Electronically signed on 2018 at 12:24:15 PM              *** Final ***                  KULDEEP MITCHELL M.D.  This document has been electronically signed. Dec 20 2018  7:38PM

## 2024-11-29 NOTE — ED PROVIDER NOTE - NSICDXPASTMEDICALHX_GEN_ALL_CORE_FT
PAST MEDICAL HISTORY:  Depression     Diabetes Mellitus Type II Insulin pump (2010)    GERD (gastroesophageal reflux disease)     Hyperlipidemia     Hypertension     Hypothyroidism     Kidney transplanted 2012    ANGELIKA (obstructive sleep apnea)     Peripheral neuropathy     Shoulder fracture Left.    
89

## 2024-11-29 NOTE — CONSULT NOTE ADULT - ASSESSMENT
Patient is a 70yo M, PMH DM, HTN, HLD, h/o kidney transplant, hypothyroidism, presents to ED from [facility] for AMS. Patient is lethargic and unable to provide history at this time.    Acute Cystitis  Sacral Wound +/- OM  Fevers  AMS 2/2 above  Febrile in the .5  UA positive for UTI  Flu/COVID/RSV negative  CT CAP w/ Small left pleural effusion with small loculated components  Right pelvic transplant kidney with mild fullness of the pelvic alyceal system.  Stercoral proctitis.  Patchy sclerosis and osteolysis throughout the bilateral sacrum with pathologic fractures. There is soft tissue with stranding extending throughout the presacral and posterior extraperitoneal pelvic soft tissues.  There are a few bubbles of air/gas at the right sacral pathologic fracture, findings suggestive of infection/osteomyelitis.    Recommendations:   C/w vancomycin, dosing per pharmacy protocol  Can de-escalate to cefepime, pt w/o ESBL hx at this time  F/u pending cultures: urine, blood  F/u MRSA swab  Trend temps/WBC  Surgery following  Additional care per primary team    Infectious Diseases will follow. Please call with any questions.  Vanessa Saul M.D.  [facility] Division of Infectious Diseases [phone]  For after 5 P.M. and weekends, please call [phone]  Available on [employer] TEAMS   Patient is a 68yo M, PMH DM, HTN, HLD, h/o kidney transplant, hypothyroidism, presents to ED from [facility] for AMS. Patient is lethargic and unable to provide history at this time.    Acute Cystitis  Sacral Wound +/- OM  Fevers  AMS 2/2 above  Febrile in the .5  UA positive for UTI  Flu/COVID/RSV negative  CT CAP w/ Small left pleural effusion with small loculated components  Right pelvic transplant kidney with mild fullness of the pelvic alyceal system.  Stercoral proctitis.  Patchy sclerosis and osteolysis throughout the bilateral sacrum with pathologic fractures. There is soft tissue with stranding extending throughout the presacral and posterior extraperitoneal pelvic soft tissues.  There are a few bubbles of air/gas at the right sacral pathologic fracture, findings suggestive of infection/osteomyelitis.    Recommendations:   C/w vancomycin, dosing per pharmacy protocol  Can de-escalate to cefepime, pt w/o ESBL hx at this time  F/u pending cultures: urine, blood  F/u MRSA swab  Trend temps/WBC  Surgery following  Additional care per primary team    Addendum  BCx + ESBL E.coli. Re-escalated back to meropenem.   Can stop vanc. repeat BCx ordered w/ AM labs.    Infectious Diseases will follow. Please call with any questions.  Vanessa Saul M.D.  \[facility]\"" Division of Infectious Diseases [phone]  For after 5 P.M. and weekends, please call [phone]  Available on [employer] TEAMS

## 2024-11-29 NOTE — ED ADULT NURSE NOTE - NSICDXPASTMEDICALHX_GEN_ALL_CORE_FT
PAST MEDICAL HISTORY:  Depression     Diabetes Mellitus Type II Insulin pump (2010)    GERD (gastroesophageal reflux disease)     Hyperlipidemia     Hypertension     Hypothyroidism     Kidney transplanted 2012    ANGELIKA (obstructive sleep apnea)     Peripheral neuropathy     Shoulder fracture Left.

## 2024-11-29 NOTE — CONSULT NOTE ADULT - NS ATTEND AMEND GEN_ALL_CORE FT
68yo M, PMH DM, HTN, HLD, h/o kidney transplant, hypothyroidism, presents to ED from [facility] for AMS, found to be febrile with positive UA. 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 Cardiology called for pericardial effusion.     - on my exam pt was lethargic and unable to provide meaningful info. taken from chart review.   - likely AMS from sepsis  - infectious w/u and abx per primary   - Orthopedic seen, f/u MRI LSp/Pelvis  - ID consult    - Ct revealed Small left pleural effusion with small loculated components, Small to moderate pericardial effusion. Approximately 5.5 cm low-density lesion anterior right hepatic lobe. Atrophic right kidney. Absent left kidney. 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 Colonic fecal retention. Stercoral proctitis. Patchy sclerosis and osteolysis throughout the bilateral sacrum with pathologic fractures. There is soft tissue with stranding extending throughout the presacral and posterior extraperitoneal pelvic soft tissues. There are a few bubbles of air/gas at the right sacral pathologic fracture, findings suggestive of infection/osteomyelitis.   - Please obtain transthoracic echocardiogram to assess the pericardial effusion and to evaluate signs of tamponade  - Pt w/no signs of tamponade on examination    - avoid dec preload.     - EKG with nonspecific TWI anteriorly. would repeat ekg neto   - check trop   - Continue aspirin and statin     - BP labile 150-200s   - Continue Norvasc 10, Hydralazine 100 tid [id number] BID and, lisinopril 20

## 2024-11-29 NOTE — ED ADULT NURSE NOTE - NSFALLRISKINTERV_ED_ALL_ED
Assistance OOB with selected safe patient handling equipment if applicable/Assistance with ambulation/Communicate fall risk and risk factors to all staff, patient, and family/Monitor gait and stability/Monitor for mental status changes and reorient to person, place, and time, as needed/Provide visual cue: yellow wristband, yellow gown, etc/Reinforce activity limits and safety measures with patient and family/Toileting schedule using arm’s reach rule for commode and bathroom/Use of alarms - bed, stretcher, chair and/or video monitoring/Call bell, personal items and telephone in reach/Instruct patient to call for assistance before getting out of bed/chair/stretcher/Non-slip footwear applied when patient is off stretcher/[place] to call system/Physically safe environment - no spills, clutter or unnecessary equipment/Purposeful Proactive Rounding/Room/bathroom lighting operational, light cord in reach

## 2024-11-29 NOTE — CHART NOTE - NSCHARTNOTEFT_GEN_A_CORE
Called by clinical pharmacist reporting culture growing ESBL E. Coli, currently on Cefepime. Recommend transitioning to meropenem. Order placed by clinical pharmacist. Infectious disease following.

## 2024-11-29 NOTE — ED ADULT NURSE NOTE - OBJECTIVE STATEMENT
per ems pt was sent in for AMS. pt was found to have a rectal temp of 103.2. and a BP of 202/55. pt noted to be lethargic and non verbal. pt in no acute distress.

## 2024-11-29 NOTE — CONSULT NOTE ADULT - SUBJECTIVE AND OBJECTIVE BOX
Patient is a 69yMale community ambulator without assistive devices who presents to [facility] ED w/ a c/o of AMS with sepsis, found to have bilateral sacral pathologic fracture with suspected superimposed osteo/infection as read by CT scan. Patient unable to provide history given AMS. [facility] and was brought to ED given change in mentation and lethargy.     Ortho hx: L hip nigel arthroplasty R ankle fracture with ORIF    Diabetes Mellitus Type II    ESRD on Dialysis    GERD (gastroesophageal reflux disease)    Depression    Hypothyroidism    Hyperlipidemia    Kidney transplanted    Hypertension    ANGELIKA (obstructive sleep apnea)    Peripheral neuropathy    Shoulder fracture            No Known Allergies      PHYSICAL EXAM:  T(C): 37.8 (11-29-24 @ 10:34), Max: 39.6 (11-29-24 @ 07:23)  HR: 66 (11-29-24 @ 10:34) (66 - 101)  BP: 173/74 (11-29-24 @ 10:34) (151/69 - 202/55)  RR: 18 (11-29-24 @ 10:34) (16 - 18)  SpO2: 96% (11-29-24 @ 10:34) (94% - 98%)    Gen: NAD, Resting comfortably    General: In no acute distress, well appearing  No TTP to C/T/LSp  Grossly moving all extremities, painless  No calf tenderness b/l  Compartments soft and compressible  DP 2+ b/l  RP 2+ b/l    Motor:                   C5                C6              C7               C8           T1   R            5/5                5/5            5/5             5/5          5/5  L             5/5               5/5             5/5             5/5          5/5                L2             L3             L4               L5            S1  R         5/5           5/5          5/5             5/5           5/5  L          5/5          5/5           5/5             5/5           5/5    Sensory:            C5         C6         C7      C8       T1        (0=absent, 1=impaired, 2=normal, NT=not testable)  R         2            2           2        2         2  L          2            2           2        2         2               L2          L3         L4      L5       S1         (0=absent, 1=impaired, 2=normal, NT=not testable)  R         2            2            2        2        2  L          2            2           2        2         2    Lazo neg b/l  Babinski neg b/l  Clonus neg b/l      Secondary Survey:   RLE/LLE/RUE/LUE: No TTP over bony prominences, SILT, palpable pulses, full/painless range of motion, compartments soft    Imaging; CT Pelvis:    "IMPRESSION:    Pathologic fractures of the bilateral sacrum with mottled   sclerosis/osteolysis, presacral/posterior pelvic extraperitoneal soft   tissue stranding and small bubbles of gas/air; findings suggestive of   infection/osteomyelitis.    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    Low-density lesion right hepatic lobe, which is poorly characterized on   this noncontrast exam.  Recommend further evaluation with MRI."         A/P: 69 M w/ CT scan read of BL Sacral fracture and possible supperimposed osteolmylietis/infection of the sacrum admitted to [facility] secondary to AMS in the setting of acute infection/UTI  Analgesia  WBAT  DVT ppx per medicine team  IV abx for possible osteo/infection as directed by ID  FU MRI LSp/Pelvis  FU ESR/CRP  PT/OT as patient is able once mental status improves  No acute orthopedic surgical intervention indicated at this time  Will discuss with attending regarding management of patient    ******************INCOMPLETE NOTE******************  ******************INCOMPLETE NOTE******************  ******************INCOMPLETE NOTE******************  ******************INCOMPLETE NOTE******************   Patient is a 69yMale community ambulator without assistive devices who presents [facility] ED w/ a c/o of AMS with sepsis, found to have bilateral sacral pathologic fracture with suspected superimposed osteo/infection as read by CT scan. Patient unable to provide history given AMS. Patient is a resident at [facility], and was brought to ED given change in mentation and lethargy.     Ortho hx: L hip nigel arthroplasty R ankle fracture with ORIF    Diabetes Mellitus Type II    ESRD on Dialysis    GERD (gastroesophageal reflux disease)    Depression    Hypothyroidism    Hyperlipidemia    Kidney transplanted    Hypertension    ANGELIKA (obstructive sleep apnea)    Peripheral neuropathy    Shoulder fracture            No Known Allergies      PHYSICAL EXAM:  T(C): 37.8 (11-29-24 @ 10:34), Max: 39.6 (11-29-24 @ 07:23)  HR: 66 (11-29-24 @ 10:34) (66 - 101)  BP: 173/74 (11-29-24 @ 10:34) (151/69 - 202/55)  RR: 18 (11-29-24 @ 10:34) (16 - 18)  SpO2: 96% (11-29-24 @ 10:34) (94% - 98%)    Gen: NAD, Resting comfortably    General: In no acute distress  No TTP to C/T/LSp  Grossly moving all extremities, painless  No calf tenderness b/l  Compartments soft and compressible  DP 2+ b/l  RP 2+ b/l  Further examination limited secondary to mental status  Grossly moves all extremities without acute pain      Lazo neg b/l  Babinski neg b/l  Clonus neg b/l      Secondary Survey:   RLE/LLE/RUE/LUE: No TTP over bony prominences, SILT, palpable pulses, full/painless range of motion, compartments soft    Imaging; CT Pelvis:    "IMPRESSION:    Pathologic fractures of the bilateral sacrum with mottled   sclerosis/osteolysis, presacral/posterior pelvic extraperitoneal soft   tissue stranding and small bubbles of gas/air; findings suggestive of   infection/osteomyelitis.    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    Low-density lesion right hepatic lobe, which is poorly characterized on   this noncontrast exam.  Recommend further evaluation with MRI."         A/P: 69 M w/ CT scan read of BL Sacral fracture and possible supperimposed osteolmylietis/infection of the sacrum admitted to [facility] secondary to AMS in the setting of acute infection/UTI  Analgesia  WBAT  DVT ppx per medicine team  IV abx for possible osteo/infection as directed by ID  FU MRI LSp/Pelvis  FU ESR/CRP  PT/OT as patient is able once mental status improves  No acute orthopedic surgical intervention indicated at this time  Will discuss with attending regarding management of patient    ******************INCOMPLETE NOTE******************  ******************INCOMPLETE NOTE******************  ******************INCOMPLETE NOTE******************  ******************INCOMPLETE NOTE******************   Patient is a 69yMale who presents to [facility] ED w/ a c/o of AMS, found to have bilateral sacral pathologic fracture with suspected superimposed osteo/infection as read by CT scan. Patient unable to provide history given AMS. Patient is a resident at [facility], and was brought to ED given change in mentation and lethargy.     Ortho hx: L hip nigel arthroplasty, R ankle fracture with ORIF    Diabetes Mellitus Type II    ESRD on Dialysis    GERD (gastroesophageal reflux disease)    Depression    Hypothyroidism    Hyperlipidemia    Kidney transplanted    Hypertension    ANGELIKA (obstructive sleep apnea)    Peripheral neuropathy    Shoulder fracture            No Known Allergies      PHYSICAL EXAM:  T(C): 37.8 (11-29-24 @ 10:34), Max: 39.6 (11-29-24 @ 07:23)  HR: 66 (11-29-24 @ 10:34) (66 - 101)  BP: 173/74 (11-29-24 @ 10:34) (151/69 - 202/55)  RR: 18 (11-29-24 @ 10:34) (16 - 18)  SpO2: 96% (11-29-24 @ 10:34) (94% - 98%)    Gen: AOx1, looks uncomfortable  Unable to determine tenderness in spine secondary to mental status  Grossly moving all extremities, LLE < RLE  Responds to noxious stimuli  Positive bilateral axial load  No calf tenderness b/l  Compartments soft and compressible  DP 2+ b/l  RP 2+ b/l  Further examination limited secondary to mental status   Difficult to ascertain secondary survey, however no overt signs otherwise throughout the rest of the body    Imaging; CT Pelvis:    "IMPRESSION:    Pathologic fractures of the bilateral sacrum with mottled   sclerosis/osteolysis, presacral/posterior pelvic extraperitoneal soft   tissue stranding and small bubbles of gas/air; findings suggestive of   infection/osteomyelitis.    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    Low-density lesion right hepatic lobe, which is poorly characterized on   this noncontrast exam.  Recommend further evaluation with MRI."         A/P: 69 M w/ CT scan read of BL Sacral fracture and possible supperimposed osteolmylietis/infection of the sacrum admitted to [facility] secondary to AMS in the setting of acute infection/UTI  Analgesia  WBAT  DVT ppx per medicine team  IV abx for possible osteo/infection as directed by ID  Recommend IR bone culture for diagnosis and abx guidance  Obtain XR Pelvis - AP, inlet, outlet  FU tumor labs - ESR/CRP/LDH/PSA/UPEP/SPEP/phos  PT/OT as patient is able once mental status improves    Will discuss with Dr. Santos and will update with any further recommendations

## 2024-11-29 NOTE — H&P ADULT - NSHPPHYSICALEXAM_GEN_ALL_CORE
T(C): 37.8 (11-29-24 @ 10:34), Max: 39.6 (11-29-24 @ 07:23)  HR: 66 (11-29-24 @ 10:34) (66 - 101)  BP: 173/74 (11-29-24 @ 10:34) (151/69 - 202/55)  RR: 18 (11-29-24 @ 10:34) (16 - 18)  SpO2: 96% (11-29-24 @ 10:34) (94% - 98%)    General: mild distress, diaphoretic  Head: normocephalic, atraumatic  Eyes: anicteric  ENT: moist mucous membranes, no pharyngeal exudates  Heart: rrr, S1, S2, poss I/VI murmur  Chest: CTA b/l, no rales, rhonchi, or wheezes  Abd: BS+, soft, NT, ND, old midline abd scar   Back: no CVA tenderness  Extr: no edema or cyanosis  Skin: cherry angiomas diffusely  Neuro: lethargic, unable to assess  Psych: unable to assess T(C): 37.8 (11-29-24 @ 10:34), Max: 39.6 (11-29-24 @ 07:23)  HR: 66 (11-29-24 @ 10:34) (66 - 101)  BP: 173/74 (11-29-24 @ 10:34) (151/69 - 202/55)  RR: 18 (11-29-24 @ 10:34) (16 - 18)  SpO2: 96% (11-29-24 @ 10:34) (94% - 98%)    General: mild distress, diaphoretic  Head: normocephalic, atraumatic  Eyes: anicteric  ENT: moist mucous membranes, no pharyngeal exudates  Heart: rrr, S1, S2, poss I/VI murmur  Chest: CTA b/l, no rales, rhonchi, or wheezes  Abd: BS+, soft, NT, ND, old midline abd scar   Back: no CVA tenderness  Extr: no edema or cyanosis  Skin: cherry angiomas diffusely, no open skin wounds on sacrum, old healed wounds on buttocks  Neuro: lethargic, unable to assess  Psych: unable to assess

## 2024-11-29 NOTE — ED PROVIDER NOTE - PHYSICAL EXAMINATION
Gen:  NAD  Head: NC/AT  Neck: trachea midline  cv: tachy  Resp:  No distress, lungs clear  abd nontender   Ext: no deformities  Neuro:  arousable to verbal stimuli  Skin:  Warm and dry as visualized

## 2024-11-29 NOTE — H&P ADULT - HISTORY OF PRESENT ILLNESS
Patient is a 70yo M, PMH DM, HTN, HLD, h/o kidney transplant, solitary kidney, hypothyroidism, presents to ED from [facility] for AMS.   Patient received Zosyn and Azithromycin 11/12-11/18 for PNA per transfer record. Patient is a 68yo M, PMH DM, HTN, HLD, h/o kidney transplant, solitary kidney, hypothyroidism, presents to ED from [facility] for AMS. Patient is lethargic and unable to provide history at this time. Information obtained from transfer record and chart.   Patient received Zosyn and Azithromycin 11/12-11/18 for PNA per transfer record.  UA positive for UTI  pan scan revealed:  Small left pleural effusion with small loculated components  Small to moderate pericardial effusion.  Approximately 5.5 cm low-density lesion anterior right hepatic lobe.  Atrophic right kidney.  Absent left kidney.  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  Colonic fecal retention  Stercoral proctitis.  Patchy sclerosis and osteolysis throughout the bilateral sacrum with   pathologic fractures.There is soft tissue with stranding extending   throughout the presacral and posterior extraperitoneal pelvic soft   tissues.  There are a few bubbles of air/gas at the right sacral pathologic   fracture, findings suggestive of   infection/osteomyelitis.     Patient is a 68yo M, PMH DM, HTN, HLD, h/o kidney transplant, hypothyroidism, presents to ED from [facility] for AMS. Patient is lethargic and unable to provide history at this time. Information obtained from transfer record and chart.   Patient received Zosyn and Azithromycin 11/12-11/18 for PNA per transfer record.  UA positive for UTI  pan scan revealed:  Small left pleural effusion with small loculated components  Small to moderate pericardial effusion.  Approximately 5.5 cm low-density lesion anterior right hepatic lobe.  Atrophic right kidney.  Absent left kidney.  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  Colonic fecal retention  Stercoral proctitis.  Patchy sclerosis and osteolysis throughout the bilateral sacrum with   pathologic fractures.There is soft tissue with stranding extending   throughout the presacral and posterior extraperitoneal pelvic soft   tissues.  There are a few bubbles of air/gas at the right sacral pathologic   fracture, findings suggestive of   infection/osteomyelitis.     Patient is a 68yo M, PMH DM, HTN, HLD, h/o kidney transplant, hypothyroidism, presents to ED from [facility] for AMS. Patient is lethargic and unable to provide history at this time. Information obtained from transfer record and chart.   Patient received Zosyn and Azithromycin 11/12-11/18 for PNA per transfer record.  UA positive for UTI  pan scan revealed:  Small left pleural effusion with small loculated components  Small to moderate pericardial effusion.  Approximately 5.5 cm low-density lesion anterior right hepatic lobe.  Atrophic right kidney.  Absent left kidney.  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  Colonic fecal retention  Stercoral proctitis.  Patchy sclerosis and osteolysis throughout the bilateral sacrum with   pathologic fractures.There is soft tissue with stranding extending   throughout the presacral and posterior extraperitoneal pelvic soft   tissues.  There are a few bubbles of air/gas at the right sacral pathologic   fracture, findings suggestive of   infection/osteomyelitis.

## 2024-11-30 DIAGNOSIS — Z94.0 KIDNEY TRANSPLANT STATUS: ICD-10-CM

## 2024-11-30 DIAGNOSIS — D63.8 ANEMIA IN OTHER CHRONIC DISEASES CLASSIFIED ELSEWHERE: ICD-10-CM

## 2024-11-30 DIAGNOSIS — A41.50 GRAM-NEGATIVE SEPSIS, UNSPECIFIED: ICD-10-CM

## 2024-11-30 LAB
A1C WITH ESTIMATED AVERAGE GLUCOSE RESULT: 7.4 % — HIGH (ref 4–5.6)
ANION GAP SERPL CALC-SCNC: 6 MMOL/L — SIGNIFICANT CHANGE UP (ref 5–17)
BASOPHILS # BLD AUTO: 0.02 K/UL — SIGNIFICANT CHANGE UP (ref 0–0.2)
BASOPHILS NFR BLD AUTO: 0.4 % — SIGNIFICANT CHANGE UP (ref 0–2)
BUN SERPL-MCNC: 25 MG/DL — HIGH (ref 7–23)
CALCIUM SERPL-MCNC: 11.1 MG/DL — HIGH (ref 8.5–10.1)
CHLORIDE SERPL-SCNC: 110 MMOL/L — HIGH (ref 96–108)
CO2 SERPL-SCNC: 28 MMOL/L — SIGNIFICANT CHANGE UP (ref 22–31)
CREAT SERPL-MCNC: 1.1 MG/DL — SIGNIFICANT CHANGE UP (ref 0.5–1.3)
EGFR: 74 ML/MIN/1.73M2 — SIGNIFICANT CHANGE UP
EOSINOPHIL # BLD AUTO: 0.02 K/UL — SIGNIFICANT CHANGE UP (ref 0–0.5)
EOSINOPHIL NFR BLD AUTO: 0.4 % — SIGNIFICANT CHANGE UP (ref 0–6)
ESTIMATED AVERAGE GLUCOSE: 166 MG/DL — HIGH (ref 68–114)
GLUCOSE SERPL-MCNC: 171 MG/DL — HIGH (ref 70–99)
HCT VFR BLD CALC: 20.2 % — CRITICAL LOW (ref 39–50)
HCT VFR BLD CALC: 21.7 % — LOW (ref 39–50)
HCT VFR BLD CALC: 21.8 % — LOW (ref 39–50)
HGB BLD-MCNC: 6.4 G/DL — CRITICAL LOW (ref 13–17)
HGB BLD-MCNC: 6.8 G/DL — CRITICAL LOW (ref 13–17)
HGB BLD-MCNC: 6.9 G/DL — CRITICAL LOW (ref 13–17)
IMM GRANULOCYTES NFR BLD AUTO: 1.7 % — HIGH (ref 0–0.9)
LYMPHOCYTES # BLD AUTO: 0.24 K/UL — LOW (ref 1–3.3)
LYMPHOCYTES # BLD AUTO: 4.6 % — LOW (ref 13–44)
MAGNESIUM SERPL-MCNC: 1.3 MG/DL — LOW (ref 1.6–2.6)
MCHC RBC-ENTMCNC: 28.8 PG — SIGNIFICANT CHANGE UP (ref 27–34)
MCHC RBC-ENTMCNC: 29 PG — SIGNIFICANT CHANGE UP (ref 27–34)
MCHC RBC-ENTMCNC: 31.7 G/DL — LOW (ref 32–36)
MCHC RBC-ENTMCNC: 31.7 G/DL — LOW (ref 32–36)
MCV RBC AUTO: 91 FL — SIGNIFICANT CHANGE UP (ref 80–100)
MCV RBC AUTO: 91.6 FL — SIGNIFICANT CHANGE UP (ref 80–100)
MONOCYTES # BLD AUTO: 0.31 K/UL — SIGNIFICANT CHANGE UP (ref 0–0.9)
MONOCYTES NFR BLD AUTO: 5.9 % — SIGNIFICANT CHANGE UP (ref 2–14)
NEUTROPHILS # BLD AUTO: 4.57 K/UL — SIGNIFICANT CHANGE UP (ref 1.8–7.4)
NEUTROPHILS NFR BLD AUTO: 87 % — HIGH (ref 43–77)
NRBC # BLD: 0 /100 WBCS — SIGNIFICANT CHANGE UP (ref 0–0)
NRBC # BLD: 0 /100 WBCS — SIGNIFICANT CHANGE UP (ref 0–0)
PHOSPHATE SERPL-MCNC: 2.6 MG/DL — SIGNIFICANT CHANGE UP (ref 2.5–4.5)
PLATELET # BLD AUTO: 218 K/UL — SIGNIFICANT CHANGE UP (ref 150–400)
PLATELET # BLD AUTO: 247 K/UL — SIGNIFICANT CHANGE UP (ref 150–400)
POTASSIUM SERPL-MCNC: 3.2 MMOL/L — LOW (ref 3.5–5.3)
POTASSIUM SERPL-SCNC: 3.2 MMOL/L — LOW (ref 3.5–5.3)
RBC # BLD: 2.22 M/UL — LOW (ref 4.2–5.8)
RBC # BLD: 2.38 M/UL — LOW (ref 4.2–5.8)
RBC # FLD: 21.1 % — HIGH (ref 10.3–14.5)
RBC # FLD: 21.2 % — HIGH (ref 10.3–14.5)
SODIUM SERPL-SCNC: 144 MMOL/L — SIGNIFICANT CHANGE UP (ref 135–145)
TACROLIMUS SERPL-MCNC: 3.7 NG/ML — SIGNIFICANT CHANGE UP
WBC # BLD: 5.06 K/UL — SIGNIFICANT CHANGE UP (ref 3.8–10.5)
WBC # BLD: 5.25 K/UL — SIGNIFICANT CHANGE UP (ref 3.8–10.5)
WBC # FLD AUTO: 5.06 K/UL — SIGNIFICANT CHANGE UP (ref 3.8–10.5)
WBC # FLD AUTO: 5.25 K/UL — SIGNIFICANT CHANGE UP (ref 3.8–10.5)

## 2024-11-30 PROCEDURE — 99233 SBSQ HOSP IP/OBS HIGH 50: CPT

## 2024-11-30 RX ORDER — ERTAPENEM 1 G/1
INJECTION, POWDER, LYOPHILIZED, FOR SOLUTION INTRAMUSCULAR; INTRAVENOUS
Refills: 0 | Status: DISCONTINUED | OUTPATIENT
Start: 2024-12-01 | End: 2024-12-10

## 2024-11-30 RX ORDER — POTASSIUM CHLORIDE 600 MG/1
40 TABLET, EXTENDED RELEASE ORAL EVERY 4 HOURS
Refills: 0 | Status: COMPLETED | OUTPATIENT
Start: 2024-11-30 | End: 2024-11-30

## 2024-11-30 RX ORDER — CEFEPIME 2 G/1
2000 INJECTION, POWDER, FOR SOLUTION INTRAVENOUS EVERY 8 HOURS
Refills: 0 | Status: DISCONTINUED | OUTPATIENT
Start: 2024-11-30 | End: 2024-11-30

## 2024-11-30 RX ORDER — CARVEDILOL 25 MG/1
6.25 TABLET, FILM COATED ORAL EVERY 12 HOURS
Refills: 0 | Status: DISCONTINUED | OUTPATIENT
Start: 2024-11-30 | End: 2024-12-01

## 2024-11-30 RX ORDER — ERTAPENEM 1 G/1
1000 INJECTION, POWDER, LYOPHILIZED, FOR SOLUTION INTRAMUSCULAR; INTRAVENOUS ONCE
Refills: 0 | Status: COMPLETED | OUTPATIENT
Start: 2024-12-01 | End: 2024-12-01

## 2024-11-30 RX ORDER — ERTAPENEM 1 G/1
1000 INJECTION, POWDER, LYOPHILIZED, FOR SOLUTION INTRAMUSCULAR; INTRAVENOUS EVERY 24 HOURS
Refills: 0 | Status: DISCONTINUED | OUTPATIENT
Start: 2024-12-02 | End: 2024-12-10

## 2024-11-30 RX ADMIN — ACETAMINOPHEN 500MG 400 MILLIGRAM(S): 500 TABLET, COATED ORAL at 04:21

## 2024-11-30 RX ADMIN — Medication 1000 UNIT(S): at 11:57

## 2024-11-30 RX ADMIN — Medication 325 MILLIGRAM(S): at 17:45

## 2024-11-30 RX ADMIN — Medication 2 TABLET(S): at 22:44

## 2024-11-30 RX ADMIN — ESCITALOPRAM OXALATE 10 MILLIGRAM(S): 10 TABLET, FILM COATED ORAL at 17:48

## 2024-11-30 RX ADMIN — AMLODIPINE BESYLATE 10 MILLIGRAM(S): 10 TABLET ORAL at 05:13

## 2024-11-30 RX ADMIN — Medication 500 MILLIGRAM(S): at 05:13

## 2024-11-30 RX ADMIN — ESCITALOPRAM OXALATE 10 MILLIGRAM(S): 10 TABLET, FILM COATED ORAL at 05:56

## 2024-11-30 RX ADMIN — Medication 25 GRAM(S): at 16:39

## 2024-11-30 RX ADMIN — Medication 5000 UNIT(S): at 05:12

## 2024-11-30 RX ADMIN — TACROLIMUS 2 MILLIGRAM(S): 5 CAPSULE ORAL at 11:58

## 2024-11-30 RX ADMIN — LISINOPRIL 20 MILLIGRAM(S): 20 TABLET ORAL at 05:13

## 2024-11-30 RX ADMIN — Medication 300 MILLIGRAM(S): at 11:57

## 2024-11-30 RX ADMIN — CARVEDILOL 6.25 MILLIGRAM(S): 25 TABLET, FILM COATED ORAL at 17:45

## 2024-11-30 RX ADMIN — Medication 50 MILLIGRAM(S): at 11:58

## 2024-11-30 RX ADMIN — CEFEPIME 100 MILLIGRAM(S): 2 INJECTION, POWDER, FOR SOLUTION INTRAVENOUS at 00:19

## 2024-11-30 RX ADMIN — Medication 6: at 17:03

## 2024-11-30 RX ADMIN — MEROPENEM 100 MILLIGRAM(S): 500 INJECTION, POWDER, FOR SOLUTION INTRAVENOUS at 05:56

## 2024-11-30 RX ADMIN — Medication 81 MILLIGRAM(S): at 11:58

## 2024-11-30 RX ADMIN — Medication 325 MILLIGRAM(S): at 05:13

## 2024-11-30 RX ADMIN — Medication 500 MILLIGRAM(S): at 17:45

## 2024-11-30 RX ADMIN — POLYETHYLENE GLYCOL 3350 17 GRAM(S): 17 POWDER, FOR SOLUTION ORAL at 11:58

## 2024-11-30 RX ADMIN — TACROLIMUS 1 MILLIGRAM(S): 5 CAPSULE ORAL at 22:43

## 2024-11-30 RX ADMIN — Medication 88 MICROGRAM(S): at 05:13

## 2024-11-30 RX ADMIN — HYDRALAZINE HYDROCHLORIDE 100 MILLIGRAM(S): 10 TABLET ORAL at 22:44

## 2024-11-30 RX ADMIN — HYDRALAZINE HYDROCHLORIDE 100 MILLIGRAM(S): 10 TABLET ORAL at 15:48

## 2024-11-30 RX ADMIN — AZATHIOPRINE 50 MILLIGRAM(S): 100 TABLET ORAL at 05:56

## 2024-11-30 RX ADMIN — Medication 2: at 08:02

## 2024-11-30 RX ADMIN — SODIUM CHLORIDE 150 MILLILITER(S): 9 INJECTION, SOLUTION INTRAMUSCULAR; INTRAVENOUS; SUBCUTANEOUS at 02:30

## 2024-11-30 RX ADMIN — HYDRALAZINE HYDROCHLORIDE 100 MILLIGRAM(S): 10 TABLET ORAL at 05:13

## 2024-11-30 RX ADMIN — INSULIN GLARGINE 17 UNIT(S): 100 INJECTION, SOLUTION SUBCUTANEOUS at 22:50

## 2024-11-30 RX ADMIN — ESCITALOPRAM OXALATE 10 MILLIGRAM(S): 10 TABLET, FILM COATED ORAL at 12:09

## 2024-11-30 RX ADMIN — AZATHIOPRINE 50 MILLIGRAM(S): 100 TABLET ORAL at 17:45

## 2024-11-30 RX ADMIN — Medication 2: at 12:07

## 2024-11-30 RX ADMIN — ROSUVASTATIN CALCIUM 10 MILLIGRAM(S): 5 TABLET, FILM COATED ORAL at 22:44

## 2024-11-30 RX ADMIN — MEROPENEM 100 MILLIGRAM(S): 500 INJECTION, POWDER, FOR SOLUTION INTRAVENOUS at 15:40

## 2024-11-30 RX ADMIN — CEFEPIME 100 MILLIGRAM(S): 2 INJECTION, POWDER, FOR SOLUTION INTRAVENOUS at 05:12

## 2024-11-30 RX ADMIN — POTASSIUM CHLORIDE 40 MILLIEQUIVALENT(S): 600 TABLET, EXTENDED RELEASE ORAL at 11:57

## 2024-11-30 RX ADMIN — POTASSIUM CHLORIDE 40 MILLIEQUIVALENT(S): 600 TABLET, EXTENDED RELEASE ORAL at 16:39

## 2024-11-30 NOTE — CONSULT NOTE ADULT - SUBJECTIVE AND OBJECTIVE BOX
Patient is a 69y old  Male who presents with a chief complaint of AMS (30 Nov 2024 11:36)      HPI:  Patient is a 70yo M, PMH DM, HTN, HLD, h/o kidney transplant, hypothyroidism, presents to ED from [facility] for AMS. Patient is lethargic and unable to provide history at this time. Information obtained from transfer record and chart.   Patient received Zosyn and Azithromycin 11/12-11/18 for PNA per transfer record.  UA positive for UTI  pan scan revealed:  Small left pleural effusion with small loculated components  Small to moderate pericardial effusion.  Approximately 5.5 cm low-density lesion anterior right hepatic lobe.  Atrophic right kidney.  Absent left kidney.  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  Colonic fecal retention  Stercoral proctitis.  Patchy sclerosis and osteolysis throughout the bilateral sacrum with   pathologic fractures.There is soft tissue with stranding extending   throughout the presacral and posterior extraperitoneal pelvic soft   tissues.  There are a few bubbles of air/gas at the right sacral pathologic   fracture, findings suggestive of   infection/osteomyelitis.     (29 Nov 2024 12:47)       ROS:  Negative except for:    PAST MEDICAL & SURGICAL HISTORY:  Diabetes Mellitus Type II  Insulin pump (2010)      GERD (gastroesophageal reflux disease)      Depression      Hypothyroidism      Hyperlipidemia      Kidney transplanted  2012      Hypertension      ANGELIKA (obstructive sleep apnea)      Peripheral neuropathy      Shoulder fracture  Left.      History of colonoscopy      S/P kidney transplant  2012      S/P cholecystectomy      Retroperitoneal fibrosis  s/p surgery      AV fistula  Right arm      S/P right cataract extraction      S/P left cataract extraction      H/O unilateral nephrectomy  Left          SOCIAL HISTORY:    FAMILY HISTORY:  MI (myocardial infarction)    Family history of obesity (Sibling)        MEDICATIONS  (STANDING):  amLODIPine   Tablet 10 milliGRAM(s) Oral daily  ascorbic acid 500 milliGRAM(s) Oral two times a day  aspirin enteric coated 81 milliGRAM(s) Oral daily  azaTHIOprine 50 milliGRAM(s) Oral two times a day  buPROPion XL (24-Hour) . 300 milliGRAM(s) Oral daily  carvedilol 6.25 milliGRAM(s) Oral every 12 hours  cholecalciferol 1000 Unit(s) Oral daily  dextrose 5%. 1000 milliLiter(s) (50 mL/Hr) IV Continuous <Continuous>  dextrose 5%. 1000 milliLiter(s) (100 mL/Hr) IV Continuous <Continuous>  dextrose 50% Injectable 25 Gram(s) IV Push once  dextrose 50% Injectable 12.5 Gram(s) IV Push once  dextrose 50% Injectable 25 Gram(s) IV Push once  escitalopram 10 milliGRAM(s) Oral <User Schedule>  ferrous    sulfate 325 milliGRAM(s) Oral two times a day  glucagon  Injectable 1 milliGRAM(s) IntraMuscular once  hydrALAZINE 100 milliGRAM(s) Oral three times a day  insulin glargine Injectable (LANTUS) 17 Unit(s) SubCutaneous at bedtime  insulin lispro (ADMELOG) corrective regimen sliding scale   SubCutaneous three times a day before meals  levothyroxine 88 MICROGram(s) Oral <User Schedule>  lisinopril 20 milliGRAM(s) Oral daily  magnesium sulfate  IVPB 2 Gram(s) IV Intermittent once  meropenem  IVPB 1000 milliGRAM(s) IV Intermittent every 8 hours  meropenem  IVPB      mineral oil enema 133 milliLiter(s) Rectal once  polyethylene glycol 3350 17 Gram(s) Oral daily  potassium chloride    Tablet ER 40 milliEquivalent(s) Oral every 4 hours  pyridoxine 50 milliGRAM(s) Oral daily  rosuvastatin 10 milliGRAM(s) Oral at bedtime  senna 2 Tablet(s) Oral at bedtime  sodium chloride 0.9%. 1000 milliLiter(s) (150 mL/Hr) IV Continuous <Continuous>  tacrolimus 2 milliGRAM(s) Oral daily  tacrolimus 1 milliGRAM(s) Oral at bedtime    MEDICATIONS  (PRN):  acetaminophen   IVPB .. 1000 milliGRAM(s) IV Intermittent every 8 hours PRN Temp greater or equal to 38C (100.4F)  aluminum hydroxide/magnesium hydroxide/simethicone Suspension 30 milliLiter(s) Oral every 4 hours PRN Dyspepsia  dextrose Oral Gel 15 Gram(s) Oral once PRN Blood Glucose LESS THAN 70 milliGRAM(s)/deciliter  hydrALAZINE Injectable 10 milliGRAM(s) IV Push every 8 hours PRN SBP >180 and HR 60-70bpm  melatonin 3 milliGRAM(s) Oral at bedtime PRN Insomnia  metoprolol tartrate Injectable 5 milliGRAM(s) IV Push every 6 hours PRN SBP >170  ondansetron Injectable 4 milliGRAM(s) IV Push every 8 hours PRN Nausea and/or Vomiting      Allergies    No Known Allergies    Intolerances        Vital Signs Last 24 Hrs  T(C): 36.3 (30 Nov 2024 14:13), Max: 39.4 (29 Nov 2024 16:21)  T(F): 97.3 (30 Nov 2024 14:13), Max: 103 (29 Nov 2024 16:21)  HR: 86 (30 Nov 2024 14:13) (70 - 86)  BP: 169/69 (30 Nov 2024 14:13) (169/69 - 196/81)  BP(mean): --  RR: 18 (30 Nov 2024 14:13) (18 - 18)  SpO2: 93% (30 Nov 2024 14:13) (83% - 97%)    Parameters below as of 30 Nov 2024 14:13  Patient On (Oxygen Delivery Method): room air        PHYSICAL EXAM  General: lethargic chronically ill appearing  HEENT: clear oropharynx, anicteric sclera, pink conjunctivae  Neck: supple  CV: normal S1S2 with no murmur rubs or gallops  Lungs: clear to auscultation, no wheezes, no rhales  Abdomen: soft non-tender non-distended, no hepato/splenomegaly  Ext: no clubbing cyanosis or edema  Skin: no rashes and no petichiae  Neuro: lethargic      LABS:    CBC Full  -  ( 30 Nov 2024 12:50 )  WBC Count : 5.06 K/uL  RBC Count : 2.38 M/uL  Hemoglobin : 6.9 g/dL  Hematocrit : 21.8 %  Platelet Count - Automated : 218 K/uL  Mean Cell Volume : 91.6 fl  Mean Cell Hemoglobin : 29.0 pg  Mean Cell Hemoglobin Concentration : 31.7 g/dL  Auto Neutrophil # : x  Auto Lymphocyte # : x  Auto Monocyte # : x  Auto Eosinophil # : x  Auto Basophil # : x  Auto Neutrophil % : x  Auto Lymphocyte % : x  Auto Monocyte % : x  Auto Eosinophil % : x  Auto Basophil % : x    11-30    144  |  110[H]  |  25[H]  ----------------------------<  171[H]  3.2[L]   |  28  |  1.10    Ca    11.1[H]      30 Nov 2024 06:40  Phos  2.6     11-30  Mg     1.3     11-30    TPro  6.9  /  Alb  x   /  TBili  x   /  DBili  x   /  AST  x   /  ALT  x   /  AlkPhos  x   11-29    PT/INR - ( 29 Nov 2024 07:15 )   PT: 13.2 sec;   INR: 1.13 ratio         PTT - ( 29 Nov 2024 07:15 )  PTT:28.8 sec          BLOOD SMEAR INTERPRETATION:    RADIOLOGY & ADDITIONAL STUDIES:    < from: CT Pelvis Bony Only No Cont (11.29.24 @ 19:59) >  ACC: [id number] EXAM:  CT 3D RECONSTRUCT MICHAEL AMEZQUITA   ORDERED BY: CHRISTO MCMAHAN     ACC: [id number] EXAM:  CT PELVIS BONY ONLY   ORDERED BY: CHRISTO MCMAHAN     PROCEDURE DATE:  11/29/2024          INTERPRETATION:  Clinical information: Sacral fracture.    Comparison: CT scan of the chest, abdomen and pelvis from the same day.    Technique:  CT scan of the pelvis.  No intravenous contrast was administered.  3D reconstructions were created.    Findings:    There is a partially imaged left hip hemiarthroplasty. There is mild   right hip osteoarthrosis. There is mild pubic symphysis arthrosis. There   is a comminuted fracture of the sacrum with extensive lucency. There are   also foci of intraosseous air within the right sacrum. In addition there   is erosion with mixed sclerosis and fracturing at the caudal aspect of   the left iliac bone adjacent to the sacral iliac joint. These findings   are consistent with pathologic fractures secondary to osteomyelitis in   the clinical setting of infection.    Soft tissue density in the retroperitoneum around the aortoiliac   bifurcation is consistent with known retroperitoneal fibrosis. There is a   transplanted kidney in the right iliac fossa. Thickening at the rectum is   consistent with stercoral colitis. There is soft tissue density   posteriorly.    There are degenerative changes of the lower lumbar spine. There are   postsurgical changes of the pelvis. There are injection granulomas and   foci of air within the anterior subcutaneous tissues of the left   hemipelvis.    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 Erosion and pathologic fracture   secondary to osteomyelitis at the caudal aspect of the adjacent left   iliac bone.    Findings consistent with stercoral colitis again noted.    --- End of Report ---            CASSANDRA MORRISON MD; Attending Radiologist  This document has been electronically signed. Nov 29 2024 10:40PM    < end of copied text >  < from: CT Abdomen and Pelvis No Cont (11.29.24 @ 07:53) >  ACC: [id number] EXAM:  CT ABDOMEN AND PELVIS   ORDERED BY: LILIBETH HORTA     ACC: [id number] EXAM:  CT CHEST   ORDERED BY: LILIBETH HORTA     PROCEDURE DATE:  11/29/2024          INTERPRETATION:  CLINICAL INFORMATION: Sepsis.    COMPARISON: CT scan chest abdomen and pelvis 9/19/2023    CONTRAST/COMPLICATIONS:  IV Contrast: NONE  Oral Contrast: NONE      PROCEDURE:  CT of the Chest, Abdomen and Pelvis was performed.  Sagittal and coronal reformats were performed.    FINDINGS:    CHEST:    LUNGS AND LARGE AIRWAYS: PLEURA:    Small left pleural effusion with small loculated components. Aspect of   the fissure.  Partial left lower lobe atelectasis.    The central airways are patent.    VESSELS: Atherosclerotic changes thoracic aorta and coronary artery   calcification.    HEART:   Cardiomegaly.  Small to moderate pericardial effusion.    Small hiatal hernia.    MEDIASTINUM AND PATRICE: No lymphadenopathy.    CHEST WALL AND LOWER NECK:  Bilateral gynecomastia.    ABDOMEN AND PELVIS:    Streak artifact degrades image quality.    Evaluation of the solid organ parenchyma is limited without intravenous   contrast.    LIVER:  Approximately 5.5 cm low-density lesion anterior right hepatic lobe.  BILE DUCTS: Probable mild intrahepatic biliary ductal prominence.  GALLBLADDER: Prior cholecystectomy.  SPLEEN: Splenomegaly.  PANCREAS: Within normal limits.  ADRENALS: Within normal limits.  KIDNEYS/URETERS:  Atrophic right kidney.  Absent left kidney.  Right pelvic transplant kidney with mild fullness of the pelvicalyceal   system.    Metallic streak artifact related to patient's left hip arthroplasty   degrades image quality limiting evaluation of the pelvis.    BLADDER: Bladder wall thickening, correlate for cystitis.  REPRODUCTIVE ORGANS: Limited.    BOWEL:   There is colonic fecal retention, without bowel obstruction.  There is rectal wall thickening with perirectal and presacral   inflammatory stranding/fluid.  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  No extraluminal gas/air or drainable fluid collection is noted.  Appendix is normal.  PERITONEUM/RETROPERITONEUM:  Confluent soft tissue within the periaortic and aortocaval   retroperitoneum, similar to prior exam compatible with known   retroperitoneal fibrosis.    VESSELS: Atherosclerotic changes.  LYMPH NODES: No lymphadenopathy.    ABDOMINAL WALL:  Postsurgical changes.  Small bilateral fat-containing inguinal hernias.    BONES:  Patchy sclerosis and osteolysis throughout the bilateral sacrum with   pathologic fractures.There is soft tissue with stranding extending   throughout the presacral and posterior extraperitoneal pelvic soft   tissues.  There are a few bubbles of air/gas at the right sacral pathologic   fracture.    Partially imaged left hip arthroplasty.    Degenerative changes.    IMPRESSION:    Pathologic fractures of the bilateral sacrum with mottled   sclerosis/osteolysis, presacral/posterior pelvic extraperitoneal soft   tissue stranding and small bubbles of gas/air; findings suggestive of   infection/osteomyelitis.    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    Low-density lesion right hepatic lobe, which is poorly characterized on   this noncontrast exam.  Recommend further evaluation with MRI.    Above findings were discussed with Dr. Harrington at the time of interpretation   on 11/29/2024    Bladder wall thickening, correlate for cystitis.    Small left-sided pleural effusion with small loculated component.  Partial left lower lobe atelectasis.    Other findings as discussed above.    --- End of Report ---            RADU MITTAL MD; Attending Radiologist  This document has been electronically signed. Nov 29 2024  8:59AM    < end of copied text >

## 2024-11-30 NOTE — PHARMACOTHERAPY INTERVENTION NOTE - COMMENTS
Blood cx positive for ESBL ecoli.  Currently ordered for meropenem and cefepime.  D/w Dr. Tubbs on Teams and discontinued cefepime order.

## 2024-11-30 NOTE — PROGRESS NOTE ADULT - SUBJECTIVE AND OBJECTIVE BOX
[facility] -- Tom Rahman Pannella, Patel, Savella, Goodger, Cohen: Office # [phone]    Follow Up:  Pericardial effusion    Subjective/Observations: Patient seen and examined. Pt sleeping, arousable, confused. Unable to obtain meaningful information 2/2 confusion. Tolerating room air.     REVIEW OF SYSTEMS: All other review of systems are negative unless indicated above    PAST MEDICAL & SURGICAL HISTORY:  Diabetes Mellitus Type II  Insulin pump ()      GERD (gastroesophageal reflux disease)      Depression      Hypothyroidism      Hypothyroidism      Hyperlipidemia      Kidney transplanted        Hypertension      Hypertension      ANGELIKA (obstructive sleep apnea)      Peripheral neuropathy      Shoulder fracture  Left.      History of colonoscopy      S/P kidney transplant        S/P cholecystectomy      Retroperitoneal fibrosis  s/p surgery      AV fistula  Right arm      S/P right cataract extraction      S/P left cataract extraction      H/O unilateral nephrectomy  Left          MEDICATIONS  (STANDING):  amLODIPine   Tablet 10 milliGRAM(s) Oral daily  ascorbic acid 500 milliGRAM(s) Oral two times a day  aspirin enteric coated 81 milliGRAM(s) Oral daily  azaTHIOprine 50 milliGRAM(s) Oral two times a day  buPROPion XL (24-Hour) . 300 milliGRAM(s) Oral daily  cholecalciferol 1000 Unit(s) Oral daily  dextrose 5%. 1000 milliLiter(s) (50 mL/Hr) IV Continuous <Continuous>  dextrose 5%. 1000 milliLiter(s) (100 mL/Hr) IV Continuous <Continuous>  dextrose 50% Injectable 25 Gram(s) IV Push once  dextrose 50% Injectable 12.5 Gram(s) IV Push once  dextrose 50% Injectable 25 Gram(s) IV Push once  escitalopram 10 milliGRAM(s) Oral <User Schedule>  ferrous    sulfate 325 milliGRAM(s) Oral two times a day  glucagon  Injectable 1 milliGRAM(s) IntraMuscular once  heparin   Injectable 5000 Unit(s) SubCutaneous every 8 hours  hydrALAZINE 100 milliGRAM(s) Oral three times a day  insulin glargine Injectable (LANTUS) 17 Unit(s) SubCutaneous at bedtime  insulin lispro (ADMELOG) corrective regimen sliding scale   SubCutaneous three times a day before meals  levothyroxine 88 MICROGram(s) Oral <User Schedule>  lisinopril 20 milliGRAM(s) Oral daily  magnesium sulfate  IVPB 2 Gram(s) IV Intermittent once  meropenem  IVPB 1000 milliGRAM(s) IV Intermittent every 8 hours  meropenem  IVPB      metoprolol tartrate 50 milliGRAM(s) Oral two times a day  mineral oil enema 133 milliLiter(s) Rectal once  polyethylene glycol 3350 17 Gram(s) Oral daily  potassium chloride    Tablet ER 40 milliEquivalent(s) Oral every 4 hours  pyridoxine 50 milliGRAM(s) Oral daily  rosuvastatin 10 milliGRAM(s) Oral at bedtime  senna 2 Tablet(s) Oral at bedtime  sodium chloride 0.9%. 1000 milliLiter(s) (150 mL/Hr) IV Continuous <Continuous>  tacrolimus 2 milliGRAM(s) Oral daily  tacrolimus 1 milliGRAM(s) Oral at bedtime    MEDICATIONS  (PRN):  acetaminophen   IVPB .. 1000 milliGRAM(s) IV Intermittent every 8 hours PRN Temp greater or equal to 38C (100.4F)  aluminum hydroxide/magnesium hydroxide/simethicone Suspension 30 milliLiter(s) Oral every 4 hours PRN Dyspepsia  dextrose Oral Gel 15 Gram(s) Oral once PRN Blood Glucose LESS THAN 70 milliGRAM(s)/deciliter  hydrALAZINE Injectable 10 milliGRAM(s) IV Push every 8 hours PRN SBP >180 and HR 60-70bpm  melatonin 3 milliGRAM(s) Oral at bedtime PRN Insomnia  metoprolol tartrate Injectable 5 milliGRAM(s) IV Push every 6 hours PRN SBP >170  ondansetron Injectable 4 milliGRAM(s) IV Push every 8 hours PRN Nausea and/or Vomiting    Allergies    No Known Allergies    Intolerances      Vital Signs Last 24 Hrs  T(C): 38.2 (2024 04:12), Max: 39.4 (2024 16:21)  T(F): 100.7 (2024 04:12), Max: 103 (2024 16:21)  HR: 83 (2024 04:12) (70 - 83)  BP: 184/72 (2024 04:12) (169/74 - 196/81)  BP(mean): --  RR: 18 (2024 04:12) (18 - 18)  SpO2: 93% (2024 04:12) (83% - 97%)    Parameters below as of 2024 04:12  Patient On (Oxygen Delivery Method): room air      I&O's Summary        TELE: SR 70-80s   PHYSICAL EXAM:  Constitutional: NAD, awake and alert  HEENT: Moist Mucous Membranes, Anicteric  Pulmonary: Non-labored, breath sounds are clear bilaterally, No wheezing, rales or rhonchi  Cardiovascular: Regular, S1 and S2, No murmurs, No rubs, gallops or clicks  Gastrointestinal:  soft, nontender, nondistended   Lymph: No peripheral edema. No lymphadenopathy.   Skin: No visible rashes or ulcers.  Psych:  confused       LABS: All Labs Reviewed:                        6.8    x     )-----------( x        ( 2024 10:20 )             21.7                         6.4    5.25  )-----------( 247      ( 2024 06:40 )             20.2                         9.2    5.07  )-----------( 485      ( 2024 07:15 )             28.4     2024 06:40    144    |  110    |  25     ----------------------------<  171    3.2     |  28     |  1.10   2024 07:38    140    |  102    |  22     ----------------------------<  229    3.8     |  30     |  0.98     Ca    11.1       2024 06:40  Ca    12.2       2024 07:38  Phos  2.6       2024 06:40  Phos  3.2       2024 17:10  Mg     1.3       2024 06:40    TPro  6.9    /  Alb  x      /  TBili  x      /  DBili  x      /  AST  x      /  ALT  x      /  AlkPhos  x      2024 17:10  TPro  7.6    /  Alb  2.4    /  TBili  0.9    /  DBili  x      /  AST  47     /  ALT  44     /  AlkPhos  116    2024 07:38   LIVER FUNCTIONS - ( 2024 17:10 )  Alb: x     / Pro: 6.9 g/dL / ALK PHOS: x     / ALT: x     / AST: x     / GGT: x           PT/INR - ( 2024 07:15 )   PT: 13.2 sec;   INR: 1.13 ratio         PTT - ( 2024 07:15 )  PTT:28.8 secLactate, Blood: 1.8 mmol/L (24 @ 07:15)    12 Lead ECG:   Ventricular Rate 76 BPM    Atrial Rate 76 BPM    P-R Interval 156 ms    QRS Duration 100 ms    Q-T Interval 374 ms    QTC Calculation(Bazett) 420 ms    P Axis 49 degrees    R Axis 56 degrees    T Axis 74 degrees    Diagnosis Line Normal sinus rhythm  Nonspecific T wave abnormality  cannot r/o anterior ischemia   Abnormal ECG  When compared with ECG of 2024 13:52,  Nonspecific T wave abnormality now evident in Lateral leads  Confirmed by SHARON BORGES ([id number]) on 2024 5:29:30 PM (24 @ 07:11)      EXAM:  ECHO TRANSTHORACIC COMP W DOPP      PROCEDURE DATE:  Dec 20 2018   .      INTERPRETATION:  REPORT:    TRANSTHORACIC ECHOCARDIOGRAM REPORT         Patient Name:   JAN CALVIN Patient Location: Inpatient  Medical Rec #:  [id number] Accession #:      [id number]  Account #:      [id number]          Height:           72.0 in 182.9 cm  YOB: 1955        Weight:           218.0 lb 98.88 kg  Patient Age:    63 years          BSA:              2.21 m²  Patient Gender: M              BP:               126/60 mmHg       Date of Exam:        2018 7:38:45 PM  Sonographer:         Fish Dee  Referring Physician: Patricio Huang MD    Procedure:     2D Echo/Doppler/Color Doppler Complete.  Indications:   Chest pain, unspecified - R07.9  Diagnosis:     Chest pain, unspecified - R07.9  Study Details: Technically difficult study. Study quality was adversely   affected                 due to patient obesity.         2D AND M-MODE MEASUREMENTS (normal ranges within parentheses):  Left                Normal    Aorta/Left           Normal  Ventricle:                    Atrium:  IVSd (2D):    1.02  (0.7-1.1) Left Atrium  3.44 cm (1.9-4.0)                cm              (2D):  LVPWd (2D):   0.97  (0.7-1.1) LA Volume    30.7                cm              Index        ml/m²  LVIDd (2D):   5.39  (3.4-5.7) Right Ventricle:                cm              TAPSE:           2.50 cm  LVIDs (2D):   3.31                cm  LV FS (2D):   38.5  (>25%)                %  LVEF (2D):   68 %  (>55%)  Relative Wall 0.36  (<0.42)  Thickness    LV SYSTOLIC FUNCTION BY 2D PLANIMETRY (MOD):  EF-A4C View: 70.9 % EF-A2C View: 73.3 % EF-Biplane: 72 %    LV DIASTOLIC FUNCTION:  MV Peak E: 0.66 m/s e', MV Gina: 0.09 m/s  MV Peak A: 0.70 m/s E/e' Ratio: 7.64  E/A Ratio: 0.95     Decel Time: 236 msec    SPECTRAL DOPPLER ANALYSIS (where applicable):  Mitral Valve:  MV P1/2 Time: 68.32 msec  MV Area, PHT: 3.22 cm²    Aortic Valve: AoV Max Christiano: 1.43 m/s AoV Peak P.2 mmHg AoV MeanP.5 mmHg    LVOT Vmax: 1.11 m/s LVOT VTI: 0.314 m LVOT Diameter: 2.38 cm    AoV Area, Vmax: 3.47 cm² AoV Area, VTI: 4.36 cm² AoV Area, Vmn:  Ao VTI: 0.322  Tricuspid Valve and PA/RV Systolic Pressure: TR Max Velocity:  RA   Pressure: 8 mmHg RVSP/PASP:    Pulmonic Valve:  PV Max Velocity: 1.08 m/s PV Max P.7 mmHg PV Mean PG:       PHYSICIAN INTERPRETATION:  Left Ventricle: Endocardial visualization was enhanced with intravenous   echo contrast. The left ventricular internal cavity size is normal. Left   ventricular wall thickness is normal.  Global LV systolic function was normal. Left ventricular ejection   fraction, by visual estimation, is 65 to 70%. Normal segmental left   ventricular systolic function. Spectral Doppler shows impaired relaxation   pattern of left ventricular myocardial filling (Grade I diastolic   dysfunction).  Right Ventricle: Normal right ventricular size and function. TV S' 0.2   m/s.  Left Atrium: The left atrium is normal in size.  Right Atrium: The right atrium is normal in size.  Pericardium: There is no evidence of pericardial effusion. There is a   significant pericardial fat pad present. There is a moderate pleural   effusion in the left lateral region.  Mitral Valve: The mitral valve is normalin structure. Mitral leaflet   mobility is normal. There is mild mitral annular calcification. No   evidence of mitral valve regurgitation is seen.  Tricuspid Valve: Structurally normal tricuspid valve, with normal leaflet   excursion.  Aortic Valve:The aortic valve was not well visualized. The aortic valve   is trileaflet. Peak Av velocity is 1.43 m/s, mean transaortic gradient   equals 4.5 mmHg, the calculated aortic valve area equals 4.36 cm² by the   continuity equation consistent with normally opening aortic valve. No   evidence of aortic valve regurgitation is seen.  Pulmonic Valve: The pulmonic valve was not well visualized.  Aorta: The aortic root is normal in size and structure. There is mild   aortic root calcification.  Pulmonary Artery: The pulmonary artery is not well seen.  Venous: The pulmonary veins appear normal. The inferior vena cava was   normal sized, with respiratory size variation less than 50%.       Summary:   1. Left ventricular ejection fraction, by visual estimation, is 65 to   70%.   2. Normal global left ventricular systolic function.   3. Normal left ventricular internal cavity size.   4. Spectral Doppler shows impaired relaxation pattern of left   ventricular myocardial filling (Grade I diastolic dysfunction).   5. Normal right ventricular size and function.   6. The left atrium is normal in size.   7. The right atrium is normal in size.   8. Moderate pleural effusion in the left lateral region.   9. There is a significant pericardial fat pad present.  10. There is no evidence of pericardial effusion.  11. Mild mitral annular calcification.  12. Structurally normal tricuspid valve, with normal leaflet excursion.  13. The pulmonary veins appear normal.  14. Endocardial visualization was enhancedwith intravenous echo contrast.  15. There is mild aortic root calcification.    [id number] Kuldeep Mitchell MD, Electronically signed on 2018 at 12:24:15 PM  *** Final ***  KULDEEP MITCHELL M.D.  This document has been electronically signed. Dec 20 2018  7:38PM

## 2024-11-30 NOTE — PROGRESS NOTE ADULT - ASSESSMENT
70yo M, PMH DM, HTN, HLD, h/o kidney transplant, hypothyroidism, presents to ED from [facility] for AMS, found to be febrile with positive UA. 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 Cardiology called for pericardial effusion.     - Ct revealed Small left pleural effusion with small loculated components, Small to moderate pericardial effusion. Approximately 5.5 cm low-density lesion anterior right hepatic lobe. Atrophic right kidney. Absent left kidney. 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 Colonic fecal retention. Stercoral proctitis. Patchy sclerosis and osteolysis throughout the bilateral sacrum with pathologic fractures. There is soft tissue with stranding extending throughout the presacral and posterior extraperitoneal pelvic soft tissues. There are a few bubbles of air/gas at the right sacral pathologic fracture, findings suggestive of infection/osteomyelitis.   - Sepsis w/u per medicine.   - Orthopedic seen, f/u MRI LSp/Pelvis  - ID consult  - Continue antibiotics     - Please obtain transthoracic echocardiogram to assess the pericardial effusion and to evaluate signs of tamponade  - Pt w/no signs of tamponade on examination    - EKG with nonspecific TWI  - Avoid anticoagulants   - IV fluids as needed. Avoid reducing preload   - No evidence of any meaningful volume overload     - EKG with nonspecific TWI anteriorly. would repeat ekg   - No evidence of any active ischemia   - Continue aspirin and statin     - -190s   - Continue Norvasc 10, Hydralazine 100 tid, [id number] BID and, lisinopril 20  - Continue Hydralazine and Metoprolol IV  - Can change BB to coreg for added BP control if BP remains elevated.     - Hemoglobin <--6.8, <--6.4, <--9.2  - Hematocrit <--21.7, <--20.2, <--28.4  - Trend and transfuse per primary    - Monitor and replete lytes, keep K>4, Mg>2.  - Will continue to follow.    Robin Cordoba, MS FNP, AGACNP  Nurse Practitioner- Cardiology   Please call on TEAMS

## 2024-11-30 NOTE — PROGRESS NOTE ADULT - ASSESSMENT
Patient is a 68yo M, PMH DM, HTN, HLD, h/o kidney transplant, hypothyroidism, presents to ED from [facility] for AMS. Patient is lethargic and unable to provide history at this time.    UA positive for UTI  pan scan revealed:  Small left pleural effusion with small loculated components  Small to moderate pericardial effusion.  Approximately 5.5 cm low-density lesion anterior right hepatic lobe.  Atrophic right kidney.  Absent left kidney.  Right pelvic transplant kidney with mild fullness of the pelvic alyceal system.  Colonic fecal retention  Stercoral proctitis.  Patchy sclerosis and osteolysis throughout the bilateral sacrum with   pathologic fractures. There is soft tissue with stranding extending   throughout the presacral and posterior extraperitoneal pelvic soft   tissues.  There are a few bubbles of air/gas at the right sacral pathologic   fracture, findings suggestive of   infection/osteomyelitis.    AMS 2/2 Sepsis 2/2 multiple infections (UTI, sacral infection, possible osteomyelitis)  Admit to Medicine  Started on Vanc and Meropenem for multiple infections  NS 150cc/hr  Tylenol PRN pain and fever  continue home meds  f/u surgery recs re: sacral infection/poss osteo  f/u ortho recs re: sacral pathologic fractures  f/u cardio recs re: pericardial effusion  f/u ID recs   start diet as tolerated   aspiration and fall risk  remote tele  f/u blood cultures  f/u urine cultures  Increased senna and added miralax for fecal retention, may need enema  zofran PRN n/v  MR pelvis/sacrum to eval for osteomyelitis, may need bone culture to further eval    Hepatic lesion  CT abd with 5.5cm lesion on R hepatic lobe  f/u MR abdomen to further evaluate    Diabetes Mellitus  NPO for now  holding home meal time novolog 10u TID-AC  Decreased nightly Lantus to 17u QHS (from home dose 35u, increase as tolerated according to fingerstick)  fingerstick glucose monitoring Q6h  ISS  A1c in AM    HTN  Continue Lisinopril 20mg daily with hold parameters  Continue Hydralazine 100mg TID with hold parameters  Continue Metoprolol tartrate 50mg BID with hold parameters  Continue Amlodipine 10mg daily    HLD  Continue Crestor 10mg daily    s/p Kidney transplant  Continue Tacrolimus 2mg daily  Continue Tacrolimus 1mg QHS  Continue Azathioprine 50mg BID  Nephrology consulted     Hypothyroidism  Continue Levothyroxine 88mcg QAM    Depression  Continue Escitalopram 10mg TID  Continue Bupropion 300mg daily    DVT ppx: Heparin Q8h  Dispo: DC planning pending hospital course    Time spent 90 min           Patient is a 70yo M, PMH DM, HTN, HLD, h/o kidney transplant, hypothyroidism, presents to ED from [facility] for AMS. Patient is lethargic and unable to provide history at this time.    UA positive for UTI  pan scan revealed:  Small left pleural effusion with small loculated components  Small to moderate pericardial effusion.  Approximately 5.5 cm low-density lesion anterior right hepatic lobe.  Atrophic right kidney.  Absent left kidney.  Right pelvic transplant kidney with mild fullness of the pelvic alyceal system.  Colonic fecal retention  Stercoral proctitis.  Patchy sclerosis and osteolysis throughout the bilateral sacrum with   pathologic fractures. There is soft tissue with stranding extending   throughout the presacral and posterior extraperitoneal pelvic soft   tissues.  There are a few bubbles of air/gas at the right sacral pathologic   fracture, findings suggestive of   infection/osteomyelitis.    AMS 2/2 Sepsis 2/2 multiple infections (UTI, sacral infection, possible osteomyelitis)  On Meropenum. ID Dr. Saul    NS 150cc/hr  Tylenol PRN pain and fever  continue home meds  f/u surgery recs re: sacral infection/poss osteo  f/u ortho recs re: sacral pathologic fractures  f/u cardio recs re: pericardial effusion  f/u ID recs   start diet as tolerated   aspiration and fall risk  remote tele  f/u blood cultures  f/u urine cultures  Increased senna and added miralax for fecal retention, may need enema  zofran PRN n/v  MR pelvis/sacrum to eval for osteomyelitis, may need bone culture to further eval    Hepatic lesion  CT abd with 5.5cm lesion on R hepatic lobe  f/u MR abdomen to further evaluate    Diabetes Mellitus  NPO for now  holding home meal time novolog 10u TID-AC  Decreased nightly Lantus to 17u QHS (from home dose 35u, increase as tolerated according to fingerstick)  fingerstick glucose monitoring Q6h  ISS  A1c in AM    HTN  Continue Lisinopril 20mg daily with hold parameters  Continue Hydralazine 100mg TID with hold parameters  Continue Metoprolol tartrate 50mg BID with hold parameters  Continue Amlodipine 10mg daily    HLD  Continue Crestor 10mg daily    s/p Kidney transplant  Continue Tacrolimus 2mg daily  Continue Tacrolimus 1mg QHS  Continue Azathioprine 50mg BID  Nephrology consulted     Hypothyroidism  Continue Levothyroxine 88mcg QAM    Depression  Continue Escitalopram 10mg TID  Continue Bupropion 300mg daily    DVT ppx: Heparin Q8h  Dispo: DC planning pending hospital course    Time spent 90 min           Patient is a 70yo M, PMH DM, HTN, HLD, h/o kidney transplant, hypothyroidism, presents to ED from [facility] for AMS. Patient is lethargic and unable to provide history at this time.    UA positive for UTI  pan scan revealed:  Small left pleural effusion with small loculated components  Small to moderate pericardial effusion.  Approximately 5.5 cm low-density lesion anterior right hepatic lobe.  Atrophic right kidney.  Absent left kidney.  Right pelvic transplant kidney with mild fullness of the pelvic alyceal system.  Colonic fecal retention  Stercoral proctitis.  Patchy sclerosis and osteolysis throughout the bilateral sacrum with   pathologic fractures. There is soft tissue with stranding extending   throughout the presacral and posterior extraperitoneal pelvic soft   tissues.  There are a few bubbles of air/gas at the right sacral pathologic   fracture, findings suggestive of   infection/osteomyelitis.    AMS 2/2 Sepsis 2/2 multiple infections (UTI, sacral infection, possible osteomyelitis)  On Meropenum. ID Dr. Saul    NS 150cc/hr  Tylenol PRN pain and fever  continue home meds  f/u surgery recs re: sacral infection/poss osteo  f/u ortho recs re: sacral pathologic fractures  f/u cardio recs re: pericardial effusion  f/u ID recs   start diet as tolerated   aspiration and fall risk  remote tele  f/u blood cultures  f/u urine cultures  Increased senna and added miralax for fecal retention, may need enema  zofran PRN n/v  MR pelvis/sacrum to eval for osteomyelitis, may need bone culture to further eval    Acute on Chronic anemia :  - Likely due to infection, IVF   - No signs of bleeding noted   -Check Iron/ TIBC, Ferritin   -Check Stool for OB  - D/w Hem Dr. Sal and Nephro Dr. Bruce, Will  monitor for now. Patient is not hypotensive or tachycardic.   -If Hb remains under 7.0 tomorrow, will transfuse.       Hepatic lesion  CT abd with 5.5cm lesion on R hepatic lobe  f/u MR abdomen to further evaluate    Diabetes Mellitus  NPO for now  holding home meal time novolog 10u TID-AC  Decreased nightly Lantus to 17u QHS (from home dose 35u, increase as tolerated according to fingerstick)  fingerstick glucose monitoring Q6h  ISS  A1c in AM    HTN  Continue Lisinopril 20mg daily with hold parameters  Continue Hydralazine 100mg TID with hold parameters  Continue Metoprolol tartrate 50mg BID with hold parameters  Continue Amlodipine 10mg daily    HLD  Continue Crestor 10mg daily    s/p Kidney transplant  Continue Tacrolimus 2mg daily  Continue Tacrolimus 1mg QHS  Continue Azathioprine 50mg BID  Nephrology consulted     Hypothyroidism  Continue Levothyroxine 88mcg QAM    Depression  Continue Escitalopram 10mg TID  Continue Bupropion 300mg daily    DVT ppx: Hold AC due to anemia and risk of bleeding   Dispo: DC planning pending hospital course

## 2024-11-30 NOTE — CONSULT NOTE ADULT - ASSESSMENT
Anemia multifactorial in etiology related to prior renal transplant with meds and now with osteomyelitis and sepsis with Gram neg maury sepsi  Blood and urine culture with E. Coli    Recommendations:  1.  follow CBC and transfuse as indicated  2.  with prior renal transplant would use irradiated PRBC and try to keep transfusions to a minimum  4.  continue renal evaluation   5.  ID evaluation and management  6.  orthopedic followup  7.  further heme recommendations pending above    discussed with Dr. Tubbs

## 2024-11-30 NOTE — PROGRESS NOTE ADULT - SUBJECTIVE AND OBJECTIVE BOX
Patient is a 69y old  Male who presents with a chief complaint of AMS (29 Nov 2024 16:52)       INTERVAL HPI/OVERNIGHT EVENTS: Patient seen and examined at bedside. Awake, alert, confused. No distress. Won't answer questions, smiles though     MEDICATIONS  (STANDING):  amLODIPine   Tablet 10 milliGRAM(s) Oral daily  ascorbic acid 500 milliGRAM(s) Oral two times a day  aspirin enteric coated 81 milliGRAM(s) Oral daily  azaTHIOprine 50 milliGRAM(s) Oral two times a day  buPROPion XL (24-Hour) . 300 milliGRAM(s) Oral daily  cefepime   IVPB 2000 milliGRAM(s) IV Intermittent every 8 hours  cholecalciferol 1000 Unit(s) Oral daily  dextrose 5%. 1000 milliLiter(s) (50 mL/Hr) IV Continuous <Continuous>  dextrose 5%. 1000 milliLiter(s) (100 mL/Hr) IV Continuous <Continuous>  dextrose 50% Injectable 25 Gram(s) IV Push once  dextrose 50% Injectable 12.5 Gram(s) IV Push once  dextrose 50% Injectable 25 Gram(s) IV Push once  escitalopram 10 milliGRAM(s) Oral <User Schedule>  ferrous    sulfate 325 milliGRAM(s) Oral two times a day  glucagon  Injectable 1 milliGRAM(s) IntraMuscular once  heparin   Injectable 5000 Unit(s) SubCutaneous every 8 hours  hydrALAZINE 100 milliGRAM(s) Oral three times a day  insulin glargine Injectable (LANTUS) 17 Unit(s) SubCutaneous at bedtime  insulin lispro (ADMELOG) corrective regimen sliding scale   SubCutaneous three times a day before meals  levothyroxine 88 MICROGram(s) Oral <User Schedule>  lisinopril 20 milliGRAM(s) Oral daily  magnesium sulfate  IVPB 2 Gram(s) IV Intermittent once  meropenem  IVPB 1000 milliGRAM(s) IV Intermittent every 8 hours  meropenem  IVPB      metoprolol tartrate 50 milliGRAM(s) Oral two times a day  mineral oil enema 133 milliLiter(s) Rectal once  polyethylene glycol 3350 17 Gram(s) Oral daily  potassium chloride    Tablet ER 40 milliEquivalent(s) Oral every 4 hours  pyridoxine 50 milliGRAM(s) Oral daily  rosuvastatin 10 milliGRAM(s) Oral at bedtime  senna 2 Tablet(s) Oral at bedtime  sodium chloride 0.9%. 1000 milliLiter(s) (150 mL/Hr) IV Continuous <Continuous>  tacrolimus 2 milliGRAM(s) Oral daily  tacrolimus 1 milliGRAM(s) Oral at bedtime    MEDICATIONS  (PRN):  acetaminophen   IVPB .. 1000 milliGRAM(s) IV Intermittent every 8 hours PRN Temp greater or equal to 38C (100.4F)  aluminum hydroxide/magnesium hydroxide/simethicone Suspension 30 milliLiter(s) Oral every 4 hours PRN Dyspepsia  dextrose Oral Gel 15 Gram(s) Oral once PRN Blood Glucose LESS THAN 70 milliGRAM(s)/deciliter  hydrALAZINE Injectable 10 milliGRAM(s) IV Push every 8 hours PRN SBP >180 and HR 60-70bpm  melatonin 3 milliGRAM(s) Oral at bedtime PRN Insomnia  metoprolol tartrate Injectable 5 milliGRAM(s) IV Push every 6 hours PRN SBP >170  ondansetron Injectable 4 milliGRAM(s) IV Push every 8 hours PRN Nausea and/or Vomiting      Allergies    No Known Allergies    Intolerances        REVIEW OF SYSTEMS:  Unable to obtain, confused patient   Vital Signs Last 24 Hrs  T(C): 38.2 (30 Nov 2024 04:12), Max: 39.4 (29 Nov 2024 16:21)  T(F): 100.7 (30 Nov 2024 04:12), Max: 103 (29 Nov 2024 16:21)  HR: 83 (30 Nov 2024 04:12) (66 - 83)  BP: 184/72 (30 Nov 2024 04:12) (151/69 - 196/81)  BP(mean): --  RR: 18 (30 Nov 2024 04:12) (16 - 18)  SpO2: 93% (30 Nov 2024 04:12) (83% - 97%)    Parameters below as of 30 Nov 2024 04:12  Patient On (Oxygen Delivery Method): room air        PHYSICAL EXAM:    General: NAD, alert   Head: normocephalic, atraumatic  Eyes: anicteric  ENT: moist mucous membranes, no pharyngeal exudates  Heart: rrr, S1, S2, poss I/VI murmur  Chest: CTA b/l, no rales, rhonchi, or wheezes  Abd: BS+, soft, NT, ND, old midline abd scar   Back: no CVA tenderness  Extr: no edema or cyanosis  Skin: cherry angiomas diffusely    LABS:    30 Nov 2024 06:40    144    |  110    |  25     ----------------------------<  171    3.2     |  28     |  1.10     Ca    11.1       30 Nov 2024 06:40  Phos  2.6       30 Nov 2024 06:40  Mg     1.3       30 Nov 2024 06:40    TPro  6.9    /  Alb  x      /  TBili  x      /  DBili  x      /  AST  x      /  ALT  x      /  AlkPhos  x      29 Nov 2024 17:10    PT/INR - ( 29 Nov 2024 07:15 )   PT: 13.2 sec;   INR: 1.13 ratio         PTT - ( 29 Nov 2024 07:15 )  PTT:28.8 sec  CAPILLARY BLOOD GLUCOSE      POCT Blood Glucose.: 177 mg/dL (30 Nov 2024 07:49)  POCT Blood Glucose.: 187 mg/dL (29 Nov 2024 23:43)  POCT Blood Glucose.: 178 mg/dL (29 Nov 2024 21:33)  POCT Blood Glucose.: 131 mg/dL (29 Nov 2024 20:07)  POCT Blood Glucose.: 271 mg/dL (29 Nov 2024 17:08)    BLOOD CULTURE  11-29 @ 07:15   Growth in aerobic bottle: Gram Negative Rods  Growth in anaerobic bottle: Gram Negative Rods  --  --  11-29 @ 07:10   Growth in anaerobic bottle: Gram Negative Rods  Growth in aerobic bottle: Gram Negative Rods  Direct identification is available within approximately 3-5  hours either by Blood Panel Multiplexed PCR or Direct  MALDI-TOF. Details: [email or web address]  --  Blood Culture PCR    RADIOLOGY & ADDITIONAL TESTS:    Imaging Personally Reviewed:  [ ] YES     Consultant(s) Notes Reviewed:      Care Discussed with Consultants/Other Providers:

## 2024-11-30 NOTE — CONSULT NOTE ADULT - ASSESSMENT
CKD 3, h/o Kidney transplant ~2012, h/o Left Nephrectomy  Diabetes  Hypertension  Sepsis, UTI, Sacral osteomyelitis, Gram negative bacteremia  Hypokalemia  Hypercalcemia  Hypomagnesemia    IV hydration. Potassium and magnesium supplementation. Work up for hypercalcemia as ordered. To continue preadmission immuno suppressants.   Monitor blood sugar levels. Insulin coverage as needed. Dietary restriction. Trend BP and titrate BP meds as needed.   Avoid nephrotoxic meds as possible. IV abx, ID follow up. Will follow electrolytes and renal function trend.     Further recommendations pending clinical course. Thank you for the courtesy of this referral.

## 2024-11-30 NOTE — PATIENT PROFILE ADULT - FALL HARM RISK - HARM RISK INTERVENTIONS

## 2024-11-30 NOTE — CONSULT NOTE ADULT - SUBJECTIVE AND OBJECTIVE BOX
[facility] Nephrology Services  Dr. Bruce, Dr. Prabhakar, Dr. Cabrales, Dr. Zaragoza, Dr. Bingham, Dr. Loya                                        [facility], [facility], [address]                                                 [address] [place], [place] [address] [place] [address]  Ph: [phone]  Fax: [phone]                                         Ph: [phone]  Fax: [phone]      Patient is a 69y old  Male who presents with a chief complaint of AMS (30 Nov 2024 08:17)    HPI:  Patient is a 68yo M, PMH DM, HTN, HLD, h/o kidney transplant, hypothyroidism, presents to ED from [facility] for AMS. Patient is lethargic and unable to provide history at this time. Information obtained from transfer record and chart.   Patient received Zosyn and Azithromycin 11/12-11/18 for PNA per transfer record.  UA positive for UTI  pan scan revealed:  Small left pleural effusion with small loculated components  Small to moderate pericardial effusion.  Approximately 5.5 cm low-density lesion anterior right hepatic lobe.  Atrophic right kidney.  Absent left kidney.  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  Colonic fecal retention  Stercoral proctitis.  Patchy sclerosis and osteolysis throughout the bilateral sacrum with   pathologic fractures.There is soft tissue with stranding extending   throughout the presacral and posterior extraperitoneal pelvic soft   tissues.  There are a few bubbles of air/gas at the right sacral pathologic   fracture, findings suggestive of   infection/osteomyelitis.     (29 Nov 2024 12:47)      PAST MEDICAL HISTORY:  Diabetes Mellitus Type II    ESRD on Dialysis    GERD (gastroesophageal reflux disease)    Depression    Hypothyroidism    Hyperlipidemia    Kidney transplanted    Hypertension    ANGELIKA (obstructive sleep apnea)    Peripheral neuropathy    Shoulder fracture        PAST SURGICAL HISTORY:  History of colonoscopy    S/P kidney transplant    S/P cholecystectomy    Retroperitoneal fibrosis    AV fistula    S/P right cataract extraction    S/P left cataract extraction    H/O unilateral nephrectomy        FAMILY HISTORY:  MI (myocardial infarction)    Family history of obesity (Sibling)        SOCIAL HISTORY:    Allergies    No Known Allergies    Intolerances      Home Medications:  amLODIPine 10 mg oral tablet: 1 tab(s) orally once a day (29 Nov 2024 12:24)  ascorbic acid 500 mg oral tablet: 1 tab(s) orally 2 times a day (29 Nov 2024 12:22)  aspirin 81 mg oral delayed release tablet: 1 tab(s) orally once a day (29 Nov 2024 12:24)  azaTHIOprine 50 mg oral tablet: 1 tab(s) orally 2 times a day (29 Nov 2024 11:54)  buPROPion 300 mg/24 hours (XL) oral tablet, extended release: 1 tab(s) orally once a day (29 Nov 2024 12:24)  cholecalciferol 25 mcg (1000 intl units) oral tablet: 1 tab(s) orally once a day (29 Nov 2024 12:24)  Crestor 10 mg oral tablet: 1 tab(s) orally once a day (at bedtime) (29 Nov 2024 12:22)  escitalopram 10 mg oral tablet: 1 tab(s) orally 3 times a day (29 Nov 2024 12:22)  ferrous sulfate: 325 milligram(s) orally 2 times a day (29 Nov 2024 12:22)  hydrALAZINE 100 mg oral tablet: 1 tab(s) orally 3 times a day (29 Nov 2024 12:24)  insulin glargine 100 units/mL subcutaneous solution: 35 unit(s) subcutaneous once a day (at bedtime) (29 Nov 2024 11:56)  levothyroxine 88 mcg (0.088 mg) oral tablet: 1 tab(s) orally once a day (29 Nov 2024 12:24)  lisinopril 20 mg oral tablet: 1 tab(s) orally once a day (29 Nov 2024 12:22)  metoprolol tartrate 50 mg oral tablet: 1 tab(s) orally 2 times a day (29 Nov 2024 12:24)  NovoLOG FlexPen 100 units/mL injectable solution: 10 unit(s) injectable 3 times a day (before meals) (29 Nov 2024 12:22)  pyridoxine 50 mg oral tablet: 1 tab(s) orally once a day (29 Nov 2024 12:22)  senna leaf extract oral tablet: 1 tab(s) orally once a day (at bedtime) (29 Nov 2024 11:55)  tacrolimus 1 mg oral capsule: 1 orally 2 capsules orally in the morning and 1 capsule orally in the evening (29 Nov 2024 12:24)    MEDICATIONS  (STANDING):  amLODIPine   Tablet 10 milliGRAM(s) Oral daily  ascorbic acid 500 milliGRAM(s) Oral two times a day  aspirin enteric coated 81 milliGRAM(s) Oral daily  azaTHIOprine 50 milliGRAM(s) Oral two times a day  buPROPion XL (24-Hour) . 300 milliGRAM(s) Oral daily  cefepime   IVPB 2000 milliGRAM(s) IV Intermittent every 8 hours  cholecalciferol 1000 Unit(s) Oral daily  dextrose 5%. 1000 milliLiter(s) (50 mL/Hr) IV Continuous <Continuous>  dextrose 5%. 1000 milliLiter(s) (100 mL/Hr) IV Continuous <Continuous>  dextrose 50% Injectable 25 Gram(s) IV Push once  dextrose 50% Injectable 12.5 Gram(s) IV Push once  dextrose 50% Injectable 25 Gram(s) IV Push once  escitalopram 10 milliGRAM(s) Oral <User Schedule>  ferrous    sulfate 325 milliGRAM(s) Oral two times a day  glucagon  Injectable 1 milliGRAM(s) IntraMuscular once  heparin   Injectable 5000 Unit(s) SubCutaneous every 8 hours  hydrALAZINE 100 milliGRAM(s) Oral three times a day  insulin glargine Injectable (LANTUS) 17 Unit(s) SubCutaneous at bedtime  insulin lispro (ADMELOG) corrective regimen sliding scale   SubCutaneous three times a day before meals  levothyroxine 88 MICROGram(s) Oral <User Schedule>  lisinopril 20 milliGRAM(s) Oral daily  magnesium sulfate  IVPB 2 Gram(s) IV Intermittent once  meropenem  IVPB 1000 milliGRAM(s) IV Intermittent every 8 hours  meropenem  IVPB      metoprolol tartrate 50 milliGRAM(s) Oral two times a day  mineral oil enema 133 milliLiter(s) Rectal once  polyethylene glycol 3350 17 Gram(s) Oral daily  potassium chloride    Tablet ER 40 milliEquivalent(s) Oral every 4 hours  pyridoxine 50 milliGRAM(s) Oral daily  rosuvastatin 10 milliGRAM(s) Oral at bedtime  senna 2 Tablet(s) Oral at bedtime  sodium chloride 0.9%. 1000 milliLiter(s) (150 mL/Hr) IV Continuous <Continuous>  tacrolimus 2 milliGRAM(s) Oral daily  tacrolimus 1 milliGRAM(s) Oral at bedtime    MEDICATIONS  (PRN):  acetaminophen   IVPB .. 1000 milliGRAM(s) IV Intermittent every 8 hours PRN Temp greater or equal to 38C (100.4F)  aluminum hydroxide/magnesium hydroxide/simethicone Suspension 30 milliLiter(s) Oral every 4 hours PRN Dyspepsia  dextrose Oral Gel 15 Gram(s) Oral once PRN Blood Glucose LESS THAN 70 milliGRAM(s)/deciliter  hydrALAZINE Injectable 10 milliGRAM(s) IV Push every 8 hours PRN SBP >180 and HR 60-70bpm  melatonin 3 milliGRAM(s) Oral at bedtime PRN Insomnia  metoprolol tartrate Injectable 5 milliGRAM(s) IV Push every 6 hours PRN SBP >170  ondansetron Injectable 4 milliGRAM(s) IV Push every 8 hours PRN Nausea and/or Vomiting      REVIEW OF SYSTEMS:  General:   Respiratory: No cough, SOB  Cardiovascular: No CP or Palpitations	  Gastrointestinal: No nausea, Vomiting. No diarrhea  Genitourinary: No urinary complaints	  Musculoskeletal: No leg swelling, No new rash or lesions	  Neurological: 	  all other systems negative    T(F): 100.7 (11-30-24 @ 04:12), Max: 103 (11-29-24 @ 16:21)  HR: 83 (11-30-24 @ 04:12) (66 - 83)  BP: 184/72 (11-30-24 @ 04:12) (169/74 - 196/81)  RR: 18 (11-30-24 @ 04:12) (18 - 18)  SpO2: 93% (11-30-24 @ 04:12) (83% - 97%)  Wt(kg): --    PHYSICAL EXAM:  General: NAD  Respiratory: b/l air entry  Cardiovascular: S1 S2  Gastrointestinal: soft  Extremities: edema        11-30    144  |  110[H]  |  25[H]  ----------------------------<  171[H]  3.2[L]   |  28  |  1.10    Ca    11.1[H]      30 Nov 2024 06:40  Phos  2.6     11-30  Mg     1.3     11-30    TPro  6.9  /  Alb  x   /  TBili  x   /  DBili  x   /  AST  x   /  ALT  x   /  AlkPhos  x   11-29                          6.4    5.25  )-----------( 247      ( 30 Nov 2024 06:40 )             20.2       Potassium: 3.2 mmol/L (11-30 @ 06:40)  Blood Urea Nitrogen: 25 mg/dL (11-30 @ 06:40)  Calcium: 11.1 mg/dL (11-30 @ 06:40)  Hemoglobin: 6.4 g/dL (11-30 @ 06:40)      Creatinine, Serum: 1.10 (11-30 @ 06:40)  Creatinine, Serum: 0.98 (11-29 @ 07:38)      Urinalysis Basic - ( 30 Nov 2024 06:40 )    Color: x / Appearance: x / SG: x / pH: x  Gluc: 171 mg/dL / Ketone: x  / Bili: x / Urobili: x   Blood: x / Protein: x / Nitrite: x   Leuk Esterase: x / RBC: x / WBC x   Sq Epi: x / Non Sq Epi: x / Bacteria: x      LIVER FUNCTIONS - ( 29 Nov 2024 17:10 )  Alb: x     / Pro: 6.9 g/dL / ALK PHOS: x     / ALT: x     / AST: x     / GGT: x                       I&O's Detail        Urinalysis with Rflx Culture (collected 29 Nov 2024 11:00)    Culture - Blood (collected 29 Nov 2024 07:15)  Source: .Blood BLOOD  Gram Stain (29 Nov 2024 21:31):    Growth in aerobic bottle: Gram Negative Rods    Growth in anaerobic bottle: Gram Negative Rods  Preliminary Report (29 Nov 2024 21:31):    Growth in aerobic bottle: Gram Negative Rods    Growth in anaerobic bottle: Gram Negative Rods    Culture - Blood (collected 29 Nov 2024 07:10)  Source: .Blood BLOOD  Gram Stain (29 Nov 2024 22:18):    Growth in anaerobic bottle: Gram Negative Rods    Growth in aerobic bottle: Gram Negative Rods  Preliminary Report (29 Nov 2024 22:19):    Growth in anaerobic bottle: Gram Negative Rods    Growth in aerobic bottle: Gram Negative Rods    Direct identification is available within approximately 3-5    hours either by Blood Panel Multiplexed PCR or Direct    MALDI-TOF. Details: [email or web address]  Organism: Blood Culture PCR (29 Nov 2024 20:19)  Organism: Blood Culture PCR (29 Nov 2024 20:19)    Sensitivities:      Method Type: PCR      -  Escherichia coli: Detec      -  ESBL: Detec      -  CTX-M Resistance Marker: Detec           [facility] Nephrology Services  Dr. Bruce, Dr. Prabhakar, Dr. Cabrales, Dr. Zaragoza, Dr. Bingham, Dr. Loya                                        [facility], [facility], [address]                                                 [address] [place], [place] [address] [place] [address]  Ph: [phone]  Fax: [phone]                                         Ph: [phone]  Fax: [phone]      Patient is a 69y old  Male who presents with a chief complaint of AMS (30 Nov 2024 08:17)    HPI:  Patient is a 68yo M, PMH DM, HTN, HLD, h/o kidney transplant, hypothyroidism, presents to ED from [facility] for AMS. Patient is lethargic and unable to provide history at this time. Information obtained from transfer record and chart.   Patient received Zosyn and Azithromycin 11/12-11/18 for PNA per transfer record.  UA positive for UTI  pan scan revealed:  Small left pleural effusion with small loculated components  Small to moderate pericardial effusion.  Approximately 5.5 cm low-density lesion anterior right hepatic lobe.  Atrophic right kidney.  Absent left kidney.  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  Colonic fecal retention  Stercoral proctitis.  Patchy sclerosis and osteolysis throughout the bilateral sacrum with   pathologic fractures.There is soft tissue with stranding extending   throughout the presacral and posterior extraperitoneal pelvic soft   tissues.  There are a few bubbles of air/gas at the right sacral pathologic   fracture, findings suggestive of   infection/osteomyelitis. (29 Nov 2024 12:47)    Renal consult called for chronic kidney disease stage 3, h/o Kidney transplant 2012      PAST MEDICAL HISTORY:  Diabetes Mellitus Type II    ESRD on Dialysis    GERD (gastroesophageal reflux disease)    Depression    Hypothyroidism    Hyperlipidemia    Kidney transplanted    Hypertension    ANGELIKA (obstructive sleep apnea)    Peripheral neuropathy    Shoulder fracture        PAST SURGICAL HISTORY:  History of colonoscopy    S/P kidney transplant    S/P cholecystectomy    Retroperitoneal fibrosis    AV fistula    S/P right cataract extraction    S/P left cataract extraction    H/O unilateral nephrectomy        FAMILY HISTORY:  MI (myocardial infarction)    Family history of obesity (Sibling)        SOCIAL HISTORY: No smoking or alcohol use     Allergies    No Known Allergies    Intolerances      Home Medications:  amLODIPine 10 mg oral tablet: 1 tab(s) orally once a day (29 Nov 2024 12:24)  ascorbic acid 500 mg oral tablet: 1 tab(s) orally 2 times a day (29 Nov 2024 12:22)  aspirin 81 mg oral delayed release tablet: 1 tab(s) orally once a day (29 Nov 2024 12:24)  azaTHIOprine 50 mg oral tablet: 1 tab(s) orally 2 times a day (29 Nov 2024 11:54)  buPROPion 300 mg/24 hours (XL) oral tablet, extended release: 1 tab(s) orally once a day (29 Nov 2024 12:24)  cholecalciferol 25 mcg (1000 intl units) oral tablet: 1 tab(s) orally once a day (29 Nov 2024 12:24)  Crestor 10 mg oral tablet: 1 tab(s) orally once a day (at bedtime) (29 Nov 2024 12:22)  escitalopram 10 mg oral tablet: 1 tab(s) orally 3 times a day (29 Nov 2024 12:22)  ferrous sulfate: 325 milligram(s) orally 2 times a day (29 Nov 2024 12:22)  hydrALAZINE 100 mg oral tablet: 1 tab(s) orally 3 times a day (29 Nov 2024 12:24)  insulin glargine 100 units/mL subcutaneous solution: 35 unit(s) subcutaneous once a day (at bedtime) (29 Nov 2024 11:56)  levothyroxine 88 mcg (0.088 mg) oral tablet: 1 tab(s) orally once a day (29 Nov 2024 12:24)  lisinopril 20 mg oral tablet: 1 tab(s) orally once a day (29 Nov 2024 12:22)  metoprolol tartrate 50 mg oral tablet: 1 tab(s) orally 2 times a day (29 Nov 2024 12:24)  NovoLOG FlexPen 100 units/mL injectable solution: 10 unit(s) injectable 3 times a day (before meals) (29 Nov 2024 12:22)  pyridoxine 50 mg oral tablet: 1 tab(s) orally once a day (29 Nov 2024 12:22)  senna leaf extract oral tablet: 1 tab(s) orally once a day (at bedtime) (29 Nov 2024 11:55)  tacrolimus 1 mg oral capsule: 1 orally 2 capsules orally in the morning and 1 capsule orally in the evening (29 Nov 2024 12:24)    MEDICATIONS  (STANDING):  amLODIPine   Tablet 10 milliGRAM(s) Oral daily  ascorbic acid 500 milliGRAM(s) Oral two times a day  aspirin enteric coated 81 milliGRAM(s) Oral daily  azaTHIOprine 50 milliGRAM(s) Oral two times a day  buPROPion XL (24-Hour) . 300 milliGRAM(s) Oral daily  cefepime   IVPB 2000 milliGRAM(s) IV Intermittent every 8 hours  cholecalciferol 1000 Unit(s) Oral daily  dextrose 5%. 1000 milliLiter(s) (50 mL/Hr) IV Continuous <Continuous>  dextrose 5%. 1000 milliLiter(s) (100 mL/Hr) IV Continuous <Continuous>  dextrose 50% Injectable 25 Gram(s) IV Push once  dextrose 50% Injectable 12.5 Gram(s) IV Push once  dextrose 50% Injectable 25 Gram(s) IV Push once  escitalopram 10 milliGRAM(s) Oral <User Schedule>  ferrous    sulfate 325 milliGRAM(s) Oral two times a day  glucagon  Injectable 1 milliGRAM(s) IntraMuscular once  heparin   Injectable 5000 Unit(s) SubCutaneous every 8 hours  hydrALAZINE 100 milliGRAM(s) Oral three times a day  insulin glargine Injectable (LANTUS) 17 Unit(s) SubCutaneous at bedtime  insulin lispro (ADMELOG) corrective regimen sliding scale   SubCutaneous three times a day before meals  levothyroxine 88 MICROGram(s) Oral <User Schedule>  lisinopril 20 milliGRAM(s) Oral daily  magnesium sulfate  IVPB 2 Gram(s) IV Intermittent once  meropenem  IVPB 1000 milliGRAM(s) IV Intermittent every 8 hours  meropenem  IVPB      metoprolol tartrate 50 milliGRAM(s) Oral two times a day  mineral oil enema 133 milliLiter(s) Rectal once  polyethylene glycol 3350 17 Gram(s) Oral daily  potassium chloride    Tablet ER 40 milliEquivalent(s) Oral every 4 hours  pyridoxine 50 milliGRAM(s) Oral daily  rosuvastatin 10 milliGRAM(s) Oral at bedtime  senna 2 Tablet(s) Oral at bedtime  sodium chloride 0.9%. 1000 milliLiter(s) (150 mL/Hr) IV Continuous <Continuous>  tacrolimus 2 milliGRAM(s) Oral daily  tacrolimus 1 milliGRAM(s) Oral at bedtime    MEDICATIONS  (PRN):  acetaminophen   IVPB .. 1000 milliGRAM(s) IV Intermittent every 8 hours PRN Temp greater or equal to 38C (100.4F)  aluminum hydroxide/magnesium hydroxide/simethicone Suspension 30 milliLiter(s) Oral every 4 hours PRN Dyspepsia  dextrose Oral Gel 15 Gram(s) Oral once PRN Blood Glucose LESS THAN 70 milliGRAM(s)/deciliter  hydrALAZINE Injectable 10 milliGRAM(s) IV Push every 8 hours PRN SBP >180 and HR 60-70bpm  melatonin 3 milliGRAM(s) Oral at bedtime PRN Insomnia  metoprolol tartrate Injectable 5 milliGRAM(s) IV Push every 6 hours PRN SBP >170  ondansetron Injectable 4 milliGRAM(s) IV Push every 8 hours PRN Nausea and/or Vomiting      REVIEW OF SYSTEMS:  General: no distress  Respiratory: No cough, SOB  Cardiovascular: No CP or Palpitations	  Gastrointestinal: No nausea, Vomiting. No diarrhea  Genitourinary: No urinary complaints	  Musculoskeletal: No new rash or lesions		  all other systems negative    T(F): 100.7 (11-30-24 @ 04:12), Max: 103 (11-29-24 @ 16:21)  HR: 83 (11-30-24 @ 04:12) (66 - 83)  BP: 184/72 (11-30-24 @ 04:12) (169/74 - 196/81)  RR: 18 (11-30-24 @ 04:12) (18 - 18)  SpO2: 93% (11-30-24 @ 04:12) (83% - 97%)  Wt(kg): --    PHYSICAL EXAM:  General: No distress  Respiratory: b/l air entry  Cardiovascular: S1 S2  Gastrointestinal: soft  Extremities: no edema        11-30    144  |  110[H]  |  25[H]  ----------------------------<  171[H]  3.2[L]   |  28  |  1.10    Ca    11.1[H]      30 Nov 2024 06:40  Phos  2.6     11-30  Mg     1.3     11-30    TPro  6.9  /  Alb  x   /  TBili  x   /  DBili  x   /  AST  x   /  ALT  x   /  AlkPhos  x   11-29                          6.4    5.25  )-----------( 247      ( 30 Nov 2024 06:40 )             20.2       Potassium: 3.2 mmol/L (11-30 @ 06:40)  Blood Urea Nitrogen: 25 mg/dL (11-30 @ 06:40)  Calcium: 11.1 mg/dL (11-30 @ 06:40)  Hemoglobin: 6.4 g/dL (11-30 @ 06:40)      Creatinine, Serum: 1.10 (11-30 @ 06:40)  Creatinine, Serum: 0.98 (11-29 @ 07:38)      Urinalysis Basic - ( 30 Nov 2024 06:40 )    Color: x / Appearance: x / SG: x / pH: x  Gluc: 171 mg/dL / Ketone: x  / Bili: x / Urobili: x   Blood: x / Protein: x / Nitrite: x   Leuk Esterase: x / RBC: x / WBC x   Sq Epi: x / Non Sq Epi: x / Bacteria: x      LIVER FUNCTIONS - ( 29 Nov 2024 17:10 )  Alb: x     / Pro: 6.9 g/dL / ALK PHOS: x     / ALT: x     / AST: x     / GGT: x                       I&O's Detail        Urinalysis with Rflx Culture (collected 29 Nov 2024 11:00)    Culture - Blood (collected 29 Nov 2024 07:15)  Source: .Blood BLOOD  Gram Stain (29 Nov 2024 21:31):    Growth in aerobic bottle: Gram Negative Rods    Growth in anaerobic bottle: Gram Negative Rods  Preliminary Report (29 Nov 2024 21:31):    Growth in aerobic bottle: Gram Negative Rods    Growth in anaerobic bottle: Gram Negative Rods    Culture - Blood (collected 29 Nov 2024 07:10)  Source: .Blood BLOOD  Gram Stain (29 Nov 2024 22:18):    Growth in anaerobic bottle: Gram Negative Rods    Growth in aerobic bottle: Gram Negative Rods  Preliminary Report (29 Nov 2024 22:19):    Growth in anaerobic bottle: Gram Negative Rods    Growth in aerobic bottle: Gram Negative Rods    Direct identification is available within approximately 3-5    hours either by Blood Panel Multiplexed PCR or Direct    MALDI-TOF. Details: [email or web address]  Organism: Blood Culture PCR (29 Nov 2024 20:19)  Organism: Blood Culture PCR (29 Nov 2024 20:19)    Sensitivities:      Method Type: PCR      -  Escherichia coli: Detec      -  ESBL: Detec      -  CTX-M Resistance Marker: Detec      < from: CT Abdomen and Pelvis No Cont (11.29.24 @ 07:53) >    ACC: [id number] EXAM:  CT ABDOMEN AND PELVIS   ORDERED BY: LILIBETH HORTA     ACC: [id number] EXAM:  CT CHEST   ORDERED BY: LILIBETH HORTA     PROCEDURE DATE:  11/29/2024          INTERPRETATION:  CLINICAL INFORMATION: Sepsis.    COMPARISON: CT scan chest abdomen and pelvis 9/19/2023    CONTRAST/COMPLICATIONS:  IV Contrast: NONE  Oral Contrast: NONE      PROCEDURE:  CT of the Chest, Abdomen and Pelvis was performed.  Sagittal and coronal reformats were performed.    FINDINGS:    CHEST:    LUNGS AND LARGE AIRWAYS: PLEURA:    Small left pleural effusion with small loculated components. Aspect of   the fissure.  Partial left lower lobe atelectasis.    The central airways are patent.    VESSELS: Atherosclerotic changes thoracic aorta and coronary artery   calcification.    HEART:   Cardiomegaly.  Small to moderate pericardial effusion.    Small hiatal hernia.    MEDIASTINUM AND PATRICE: No lymphadenopathy.    CHEST WALL AND LOWER NECK:  Bilateral gynecomastia.    ABDOMEN AND PELVIS:    Streak artifact degrades image quality.    Evaluation of the solid organ parenchyma is limited without intravenous   contrast.    LIVER:  Approximately 5.5 cm low-density lesion anterior right hepatic lobe.  BILE DUCTS: Probable mild intrahepatic biliary ductal prominence.  GALLBLADDER: Prior cholecystectomy.  SPLEEN: Splenomegaly.  PANCREAS: Within normal limits.  ADRENALS: Within normal limits.  KIDNEYS/URETERS:  Atrophic right kidney.  Absent left kidney.  Right pelvic transplant kidney with mild fullness of the pelvicalyceal   system.    Metallic streak artifact related to patient's left hip arthroplasty   degrades image quality limiting evaluation of the pelvis.    BLADDER: Bladder wall thickening, correlate for cystitis.  REPRODUCTIVE ORGANS: Limited.    BOWEL:   There is colonic fecal retention, without bowel obstruction.  There is rectal wall thickening with perirectal and presacral   inflammatory stranding/fluid.  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  No extraluminal gas/air or drainable fluid collection is noted.  Appendix is normal.  PERITONEUM/RETROPERITONEUM:  Confluent soft tissue within the periaortic and aortocaval   retroperitoneum, similar to prior exam compatible with known   retroperitoneal fibrosis.    VESSELS: Atherosclerotic changes.  LYMPH NODES: No lymphadenopathy.    ABDOMINAL WALL:  Postsurgical changes.  Small bilateral fat-containing inguinal hernias.    BONES:  Patchy sclerosis and osteolysis throughout the bilateral sacrum with   pathologic fractures.There is soft tissue with stranding extending   throughout the presacral and posterior extraperitoneal pelvic soft   tissues.  There are a few bubbles of air/gas at the right sacral pathologic   fracture.    Partially imaged left hip arthroplasty.    Degenerative changes.    IMPRESSION:    Pathologic fractures of the bilateral sacrum with mottled   sclerosis/osteolysis, presacral/posterior pelvic extraperitoneal soft   tissue stranding and small bubbles of gas/air; findings suggestive of   infection/osteomyelitis.    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    Low-density lesion right hepatic lobe, which is poorly characterized on   this noncontrast exam.  Recommend further evaluation with MRI.    Above findings were discussed with Dr. Harrington at the time of interpretation   on 11/29/2024    Bladder wall thickening, correlate for cystitis.    Small left-sided pleural effusion with small loculated component.  Partial left lower lobe atelectasis.    Other findings as discussed above.    --- End of Report ---            RADU MITTAL MD; Attending Radiologist  This document has been electronically signed. Nov 29 2024  8:59AM    < end of copied text >  < from: Xray Chest 1 View- PORTABLE-Urgent (11.29.24 @ 07:29) >    ACC: [id number] EXAM:  XR CHEST PORTABLE URGENT 1V   ORDERED BY: LILIBETH HORTA     ACC: [id number] EXAM:  XR PELVIS COMPLETE MIN 3 VIEWS   ORDERED BY: CHRISTO MCMAHAN     PROCEDURE DATE:  11/29/2024          INTERPRETATION:  Pelvis and chest. Concern is pathologic fracture. CAT   scan of the same day showed pathologic fractures of the sacrum with   extraperitoneal mass.    Pelvis. Inlet and left views with standard views. 4 images.    Clips over the right lower quadrant left hip replacement again noted. On   standard x-ray no fractures or bone destruction evident. Clips in the   left lower abdomen seen.    Findings are similar to September 7, 2023.    AP chest on November 29, 2024 at 7:24 AM.    Heart magnified by technique. Lungs are clear. Bones show no acute   fracture. Old left rib fractures are again seen Similar to CAT scan   September 7, 2023.    IMPRESSION: Known pathologic fractures in the sacrum do not project well   on standard films. No acute finding.    --- End of Report ---            STEVEN RHODES MD; Attending Radiologist  This document has been electronically signed. Nov 29 2024  5:06PM    < end of copied text >  < from: CT 3D Reconstruct w/o Workstation (11.29.24 @ 19:59) >

## 2024-11-30 NOTE — PROGRESS NOTE ADULT - SUBJECTIVE AND OBJECTIVE BOX
[facility], Division of Infectious Diseases  WANG Mccoy Y. Patel, S. Shah, G. [facility]  [phone]    Name: JAN CALVIN  Age: 69y  Gender: Male  MRN: [id number]    Interval History:  febrile overnight  Notes reviewed    Antibiotics:  meropenem  IVPB 1000 milliGRAM(s) IV Intermittent every 8 hours  meropenem  IVPB          Medications:  acetaminophen   IVPB .. 1000 milliGRAM(s) IV Intermittent every 8 hours PRN  aluminum hydroxide/magnesium hydroxide/simethicone Suspension 30 milliLiter(s) Oral every 4 hours PRN  amLODIPine   Tablet 10 milliGRAM(s) Oral daily  ascorbic acid 500 milliGRAM(s) Oral two times a day  aspirin enteric coated 81 milliGRAM(s) Oral daily  azaTHIOprine 50 milliGRAM(s) Oral two times a day  buPROPion XL (24-Hour) . 300 milliGRAM(s) Oral daily  carvedilol 6.25 milliGRAM(s) Oral every 12 hours  cholecalciferol 1000 Unit(s) Oral daily  dextrose 5%. 1000 milliLiter(s) IV Continuous <Continuous>  dextrose 5%. 1000 milliLiter(s) IV Continuous <Continuous>  dextrose 50% Injectable 25 Gram(s) IV Push once  dextrose 50% Injectable 12.5 Gram(s) IV Push once  dextrose 50% Injectable 25 Gram(s) IV Push once  dextrose Oral Gel 15 Gram(s) Oral once PRN  escitalopram 10 milliGRAM(s) Oral <User Schedule>  ferrous    sulfate 325 milliGRAM(s) Oral two times a day  glucagon  Injectable 1 milliGRAM(s) IntraMuscular once  hydrALAZINE 100 milliGRAM(s) Oral three times a day  hydrALAZINE Injectable 10 milliGRAM(s) IV Push every 8 hours PRN  insulin glargine Injectable (LANTUS) 17 Unit(s) SubCutaneous at bedtime  insulin lispro (ADMELOG) corrective regimen sliding scale   SubCutaneous three times a day before meals  levothyroxine 88 MICROGram(s) Oral <User Schedule>  lisinopril 20 milliGRAM(s) Oral daily  melatonin 3 milliGRAM(s) Oral at bedtime PRN  meropenem  IVPB 1000 milliGRAM(s) IV Intermittent every 8 hours  meropenem  IVPB      metoprolol tartrate Injectable 5 milliGRAM(s) IV Push every 6 hours PRN  mineral oil enema 133 milliLiter(s) Rectal once  ondansetron Injectable 4 milliGRAM(s) IV Push every 8 hours PRN  polyethylene glycol 3350 17 Gram(s) Oral daily  pyridoxine 50 milliGRAM(s) Oral daily  rosuvastatin 10 milliGRAM(s) Oral at bedtime  senna 2 Tablet(s) Oral at bedtime  sodium chloride 0.9%. 1000 milliLiter(s) IV Continuous <Continuous>  tacrolimus 2 milliGRAM(s) Oral daily  tacrolimus 1 milliGRAM(s) Oral at bedtime      Review of Systems:  unable to obtain  Allergies: No Known Allergies    For details regarding the patient's past medical history, social history, family history, and other miscellaneous elements, please refer the initial infectious diseases consultation and/or the admitting history and physical examination for this admission.    Objective:  Vitals:   T(C): 36.3 (11-30-24 @ 14:13), Max: 38.3 (11-29-24 @ 20:05)  HR: 97 (11-30-24 @ 15:48) (70 - 97)  BP: 187/64 (11-30-24 @ 15:48) (169/69 - 195/83)  RR: 18 (11-30-24 @ 14:13) (18 - 18)  SpO2: 93% (11-30-24 @ 14:13) (83% - 96%)    Physical Examination:  General: no acute distress  HEENT: NC/AT, EOMI,  Cardio: S1, S2 heard, RRR, no murmurs  Resp: breath sounds heard bilaterally, no rales, wheezes or rhonchi  Abd: soft, NT, ND  Ext: no edema or cyanosis  Skin: warm, dry, no visible rash      Laboratory Studies:  CBC:                       6.9    5.06  )-----------( 218      ( 30 Nov 2024 12:50 )             21.8     CMP: 11-30    144  |  110[H]  |  25[H]  ----------------------------<  171[H]  3.2[L]   |  28  |  1.10    Ca    11.1[H]      30 Nov 2024 06:40  Phos  2.6     11-30  Mg     1.3     11-30    TPro  6.9  /  Alb  x   /  TBili  x   /  DBili  x   /  AST  x   /  ALT  x   /  AlkPhos  x   11-29    LIVER FUNCTIONS - ( 29 Nov 2024 17:10 )  Alb: x     / Pro: 6.9 g/dL / ALK PHOS: x     / ALT: x     / AST: x     / GGT: x           Urinalysis Basic - ( 30 Nov 2024 06:40 )    Color: x / Appearance: x / SG: x / pH: x  Gluc: 171 mg/dL / Ketone: x  / Bili: x / Urobili: x   Blood: x / Protein: x / Nitrite: x   Leuk Esterase: x / RBC: x / WBC x   Sq Epi: x / Non Sq Epi: x / Bacteria: x        Microbiology: reviewed    Culture - Urine (collected 11-29-24 @ 11:00)  Source: Catheterized  Preliminary Report (11-30-24 @ 10:38):    50,000 - 99,000 CFU/mL Escherichia coli    Urinalysis with Rflx Culture (collected 11-29-24 @ 11:00)    Culture - Blood (collected 11-29-24 @ 07:15)  Source: .Blood BLOOD  Gram Stain (11-29-24 @ 21:31):    Growth in aerobic bottle: Gram Negative Rods    Growth in anaerobic bottle: Gram Negative Rods  Preliminary Report (11-29-24 @ 21:31):    Growth in aerobic bottle: Gram Negative Rods    Growth in anaerobic bottle: Gram Negative Rods    Culture - Blood (collected 11-29-24 @ 07:10)  Source: .Blood BLOOD  Gram Stain (11-29-24 @ 22:18):    Growth in anaerobic bottle: Gram Negative Rods    Growth in aerobic bottle: Gram Negative Rods  Preliminary Report (11-29-24 @ 22:19):    Growth in anaerobic bottle: Gram Negative Rods    Growth in aerobic bottle: Gram Negative Rods    Direct identification is available within approximately 3-5    hours either by Blood Panel Multiplexed PCR or Direct    MALDI-TOF. Details: [email or web address]  Organism: Blood Culture PCR (11-29-24 @ 20:19)  Organism: Blood Culture PCR (11-29-24 @ 20:19)      Method Type: PCR      -  Escherichia coli: Detec      -  ESBL: Detec      -  CTX-M Resistance Marker: Detec          Radiology: reviewed

## 2024-11-30 NOTE — PROGRESS NOTE ADULT - ASSESSMENT
Patient is a 70yo M, PMH DM, HTN, HLD, h/o kidney transplant, hypothyroidism, presents to ED from [facility] for AMS. Patient is lethargic and unable to provide history at this time.    ESBL Bacteremia 2/2 Acute Cystitis  Sacral Wound +/- OM  Fevers  AMS 2/2 above  Febrile in the .5  UA positive for UTI  Flu/COVID/RSV negative  11/29 BCx ESBL E.coli   11/29 UCx   50,000 - 99,000 CFU/mL Escherichia coli    CT CAP w/ Small left pleural effusion with small loculated components  Right pelvic transplant kidney with mild fullness of the pelvic alyceal system.  Stercoral proctitis.  Patchy sclerosis and osteolysis throughout the bilateral sacrum with pathologic fractures. There is soft tissue with stranding extending throughout the presacral and posterior extraperitoneal pelvic soft tissues.  There are a few bubbles of air/gas at the right sacral pathologic fracture, findings suggestive of infection/osteomyelitis.    Reviewed w/ admitting attending, no sacral wound present    Recommendations:   De-escalate to ertapenem 1gm q24h  F/u UCx susceptibilities   F/u repeat BCx  Trend temps/WBC  Surgery following  Additional care per primary team    Infectious Diseases will follow. Please call with any questions.  Vanessa Saul M.D.  [facility] Division of Infectious Diseases [phone]  For after 5 P.M. and weekends, please call [phone]  Available on [employer] TEAMS

## 2024-12-01 LAB
-  AMPICILLIN/SULBACTAM: SIGNIFICANT CHANGE UP
-  AMPICILLIN/SULBACTAM: SIGNIFICANT CHANGE UP
-  AMPICILLIN: SIGNIFICANT CHANGE UP
-  AMPICILLIN: SIGNIFICANT CHANGE UP
-  AZTREONAM: SIGNIFICANT CHANGE UP
-  AZTREONAM: SIGNIFICANT CHANGE UP
-  CEFAZOLIN: SIGNIFICANT CHANGE UP
-  CEFAZOLIN: SIGNIFICANT CHANGE UP
-  CEFEPIME: SIGNIFICANT CHANGE UP
-  CEFEPIME: SIGNIFICANT CHANGE UP
-  CEFTRIAXONE: SIGNIFICANT CHANGE UP
-  CEFTRIAXONE: SIGNIFICANT CHANGE UP
-  CEFUROXIME: SIGNIFICANT CHANGE UP
-  CIPROFLOXACIN: SIGNIFICANT CHANGE UP
-  CIPROFLOXACIN: SIGNIFICANT CHANGE UP
-  ERTAPENEM: SIGNIFICANT CHANGE UP
-  ERTAPENEM: SIGNIFICANT CHANGE UP
-  GENTAMICIN: SIGNIFICANT CHANGE UP
-  GENTAMICIN: SIGNIFICANT CHANGE UP
-  IMIPENEM: SIGNIFICANT CHANGE UP
-  IMIPENEM: SIGNIFICANT CHANGE UP
-  LEVOFLOXACIN: SIGNIFICANT CHANGE UP
-  LEVOFLOXACIN: SIGNIFICANT CHANGE UP
-  MEROPENEM: SIGNIFICANT CHANGE UP
-  MEROPENEM: SIGNIFICANT CHANGE UP
-  NITROFURANTOIN: SIGNIFICANT CHANGE UP
-  PIPERACILLIN/TAZOBACTAM: SIGNIFICANT CHANGE UP
-  PIPERACILLIN/TAZOBACTAM: SIGNIFICANT CHANGE UP
-  TOBRAMYCIN: SIGNIFICANT CHANGE UP
-  TOBRAMYCIN: SIGNIFICANT CHANGE UP
-  TRIMETHOPRIM/SULFAMETHOXAZOLE: SIGNIFICANT CHANGE UP
-  TRIMETHOPRIM/SULFAMETHOXAZOLE: SIGNIFICANT CHANGE UP
ALBUMIN SERPL ELPH-MCNC: 2.1 G/DL — LOW (ref 3.3–5)
ALP SERPL-CCNC: 106 U/L — SIGNIFICANT CHANGE UP (ref 40–120)
ALT FLD-CCNC: 45 U/L — SIGNIFICANT CHANGE UP (ref 12–78)
ANION GAP SERPL CALC-SCNC: 3 MMOL/L — LOW (ref 5–17)
AST SERPL-CCNC: 59 U/L — HIGH (ref 15–37)
BASOPHILS # BLD AUTO: 0.01 K/UL — SIGNIFICANT CHANGE UP (ref 0–0.2)
BASOPHILS NFR BLD AUTO: 0.2 % — SIGNIFICANT CHANGE UP (ref 0–2)
BILIRUB SERPL-MCNC: 0.6 MG/DL — SIGNIFICANT CHANGE UP (ref 0.2–1.2)
BUN SERPL-MCNC: 23 MG/DL — SIGNIFICANT CHANGE UP (ref 7–23)
CALCIUM SERPL-MCNC: 11.7 MG/DL — HIGH (ref 8.5–10.1)
CHLORIDE SERPL-SCNC: 112 MMOL/L — HIGH (ref 96–108)
CO2 SERPL-SCNC: 29 MMOL/L — SIGNIFICANT CHANGE UP (ref 22–31)
CREAT SERPL-MCNC: 1 MG/DL — SIGNIFICANT CHANGE UP (ref 0.5–1.3)
CULTURE RESULTS: ABNORMAL
EGFR: 82 ML/MIN/1.73M2 — SIGNIFICANT CHANGE UP
EOSINOPHIL # BLD AUTO: 0.05 K/UL — SIGNIFICANT CHANGE UP (ref 0–0.5)
EOSINOPHIL NFR BLD AUTO: 1.1 % — SIGNIFICANT CHANGE UP (ref 0–6)
FERRITIN SERPL-MCNC: 885 NG/ML — HIGH (ref 30–400)
GLUCOSE SERPL-MCNC: 204 MG/DL — HIGH (ref 70–99)
HCT VFR BLD CALC: 23 % — LOW (ref 39–50)
HGB BLD-MCNC: 7.3 G/DL — LOW (ref 13–17)
IMM GRANULOCYTES NFR BLD AUTO: 0.8 % — SIGNIFICANT CHANGE UP (ref 0–0.9)
IRON SATN MFR SERPL: 14 % — LOW (ref 16–55)
IRON SATN MFR SERPL: 22 UG/DL — LOW (ref 45–165)
LYMPHOCYTES # BLD AUTO: 0.22 K/UL — LOW (ref 1–3.3)
LYMPHOCYTES # BLD AUTO: 4.7 % — LOW (ref 13–44)
MAGNESIUM SERPL-MCNC: 1.8 MG/DL — SIGNIFICANT CHANGE UP (ref 1.6–2.6)
MCHC RBC-ENTMCNC: 28.7 PG — SIGNIFICANT CHANGE UP (ref 27–34)
MCHC RBC-ENTMCNC: 31.7 G/DL — LOW (ref 32–36)
MCV RBC AUTO: 90.6 FL — SIGNIFICANT CHANGE UP (ref 80–100)
METHOD TYPE: SIGNIFICANT CHANGE UP
METHOD TYPE: SIGNIFICANT CHANGE UP
MONOCYTES # BLD AUTO: 0.29 K/UL — SIGNIFICANT CHANGE UP (ref 0–0.9)
MONOCYTES NFR BLD AUTO: 6.1 % — SIGNIFICANT CHANGE UP (ref 2–14)
NEUTROPHILS # BLD AUTO: 4.11 K/UL — SIGNIFICANT CHANGE UP (ref 1.8–7.4)
NEUTROPHILS NFR BLD AUTO: 87.1 % — HIGH (ref 43–77)
NRBC # BLD: 0 /100 WBCS — SIGNIFICANT CHANGE UP (ref 0–0)
ORGANISM # SPEC MICROSCOPIC CNT: ABNORMAL
ORGANISM # SPEC MICROSCOPIC CNT: SIGNIFICANT CHANGE UP
PHOSPHATE SERPL-MCNC: 1.6 MG/DL — LOW (ref 2.5–4.5)
PLATELET # BLD AUTO: 228 K/UL — SIGNIFICANT CHANGE UP (ref 150–400)
POTASSIUM SERPL-MCNC: 3.7 MMOL/L — SIGNIFICANT CHANGE UP (ref 3.5–5.3)
POTASSIUM SERPL-SCNC: 3.7 MMOL/L — SIGNIFICANT CHANGE UP (ref 3.5–5.3)
PROT SERPL-MCNC: 6.8 G/DL — SIGNIFICANT CHANGE UP (ref 6–8.3)
RBC # BLD: 2.54 M/UL — LOW (ref 4.2–5.8)
RBC # FLD: 21 % — HIGH (ref 10.3–14.5)
SODIUM SERPL-SCNC: 144 MMOL/L — SIGNIFICANT CHANGE UP (ref 135–145)
SPECIMEN SOURCE: SIGNIFICANT CHANGE UP
TIBC SERPL-MCNC: 164 UG/DL — LOW (ref 220–430)
UIBC SERPL-MCNC: 141 UG/DL — SIGNIFICANT CHANGE UP (ref 110–370)
WBC # BLD: 4.72 K/UL — SIGNIFICANT CHANGE UP (ref 3.8–10.5)
WBC # FLD AUTO: 4.72 K/UL — SIGNIFICANT CHANGE UP (ref 3.8–10.5)

## 2024-12-01 PROCEDURE — 99233 SBSQ HOSP IP/OBS HIGH 50: CPT

## 2024-12-01 PROCEDURE — 93010 ELECTROCARDIOGRAM REPORT: CPT

## 2024-12-01 RX ORDER — 0.9 % SODIUM CHLORIDE 0.9 %
1000 INTRAVENOUS SOLUTION INTRAVENOUS
Refills: 0 | Status: DISCONTINUED | OUTPATIENT
Start: 2024-12-01 | End: 2024-12-06

## 2024-12-01 RX ORDER — SODIUM,POTASSIUM PHOSPHATES 278-250MG
1 POWDER IN PACKET (EA) ORAL ONCE
Refills: 0 | Status: COMPLETED | OUTPATIENT
Start: 2024-12-01 | End: 2024-12-01

## 2024-12-01 RX ORDER — POTASSIUM PHOSPHATE, MONOBASIC POTASSIUM PHOSPHATE, DIBASIC INJECTION, 236; 224 MG/ML; MG/ML
30 SOLUTION, CONCENTRATE INTRAVENOUS ONCE
Refills: 0 | Status: COMPLETED | OUTPATIENT
Start: 2024-12-01 | End: 2024-12-01

## 2024-12-01 RX ORDER — CARVEDILOL 25 MG/1
12.5 TABLET, FILM COATED ORAL EVERY 12 HOURS
Refills: 0 | Status: DISCONTINUED | OUTPATIENT
Start: 2024-12-01 | End: 2024-12-18

## 2024-12-01 RX ADMIN — Medication 8: at 16:57

## 2024-12-01 RX ADMIN — Medication 300 MILLIGRAM(S): at 11:26

## 2024-12-01 RX ADMIN — Medication 4: at 07:46

## 2024-12-01 RX ADMIN — Medication 325 MILLIGRAM(S): at 05:17

## 2024-12-01 RX ADMIN — POTASSIUM PHOSPHATE, MONOBASIC POTASSIUM PHOSPHATE, DIBASIC INJECTION, 83.33 MILLIMOLE(S): 236; 224 SOLUTION, CONCENTRATE INTRAVENOUS at 10:44

## 2024-12-01 RX ADMIN — Medication 2 TABLET(S): at 23:14

## 2024-12-01 RX ADMIN — POLYETHYLENE GLYCOL 3350 17 GRAM(S): 17 POWDER, FOR SOLUTION ORAL at 11:26

## 2024-12-01 RX ADMIN — HYDRALAZINE HYDROCHLORIDE 100 MILLIGRAM(S): 10 TABLET ORAL at 23:15

## 2024-12-01 RX ADMIN — AMLODIPINE BESYLATE 10 MILLIGRAM(S): 10 TABLET ORAL at 05:16

## 2024-12-01 RX ADMIN — TACROLIMUS 1 MILLIGRAM(S): 5 CAPSULE ORAL at 23:14

## 2024-12-01 RX ADMIN — HYDRALAZINE HYDROCHLORIDE 100 MILLIGRAM(S): 10 TABLET ORAL at 05:17

## 2024-12-01 RX ADMIN — Medication 325 MILLIGRAM(S): at 16:58

## 2024-12-01 RX ADMIN — Medication 88 MICROGRAM(S): at 05:16

## 2024-12-01 RX ADMIN — HYDRALAZINE HYDROCHLORIDE 100 MILLIGRAM(S): 10 TABLET ORAL at 15:27

## 2024-12-01 RX ADMIN — Medication 1 TABLET(S): at 12:09

## 2024-12-01 RX ADMIN — ROSUVASTATIN CALCIUM 10 MILLIGRAM(S): 5 TABLET, FILM COATED ORAL at 23:14

## 2024-12-01 RX ADMIN — INSULIN GLARGINE 17 UNIT(S): 100 INJECTION, SOLUTION SUBCUTANEOUS at 23:16

## 2024-12-01 RX ADMIN — CARVEDILOL 12.5 MILLIGRAM(S): 25 TABLET, FILM COATED ORAL at 16:58

## 2024-12-01 RX ADMIN — ERTAPENEM 120 MILLIGRAM(S): 1 INJECTION, POWDER, LYOPHILIZED, FOR SOLUTION INTRAMUSCULAR; INTRAVENOUS at 06:11

## 2024-12-01 RX ADMIN — ESCITALOPRAM OXALATE 10 MILLIGRAM(S): 10 TABLET, FILM COATED ORAL at 17:15

## 2024-12-01 RX ADMIN — TACROLIMUS 2 MILLIGRAM(S): 5 CAPSULE ORAL at 11:26

## 2024-12-01 RX ADMIN — Medication 4: at 12:06

## 2024-12-01 RX ADMIN — Medication 81 MILLIGRAM(S): at 11:25

## 2024-12-01 RX ADMIN — LISINOPRIL 20 MILLIGRAM(S): 20 TABLET ORAL at 05:17

## 2024-12-01 RX ADMIN — ACETAMINOPHEN 500MG 1000 MILLIGRAM(S): 500 TABLET, COATED ORAL at 05:21

## 2024-12-01 RX ADMIN — ESCITALOPRAM OXALATE 10 MILLIGRAM(S): 10 TABLET, FILM COATED ORAL at 05:16

## 2024-12-01 RX ADMIN — ESCITALOPRAM OXALATE 10 MILLIGRAM(S): 10 TABLET, FILM COATED ORAL at 11:26

## 2024-12-01 RX ADMIN — Medication 50 MILLIGRAM(S): at 11:26

## 2024-12-01 RX ADMIN — Medication 500 MILLIGRAM(S): at 05:17

## 2024-12-01 RX ADMIN — AZATHIOPRINE 50 MILLIGRAM(S): 100 TABLET ORAL at 16:59

## 2024-12-01 RX ADMIN — AZATHIOPRINE 50 MILLIGRAM(S): 100 TABLET ORAL at 05:17

## 2024-12-01 RX ADMIN — Medication 500 MILLIGRAM(S): at 16:58

## 2024-12-01 RX ADMIN — CARVEDILOL 6.25 MILLIGRAM(S): 25 TABLET, FILM COATED ORAL at 05:16

## 2024-12-01 RX ADMIN — Medication 1000 UNIT(S): at 11:25

## 2024-12-01 NOTE — SWALLOW BEDSIDE ASSESSMENT ADULT - SLP GENERAL OBSERVATIONS
Pt received upright in bed resting but easily roused. Oriented to self only, confusion noted. Respiratory status appearing stable on room air. No complaints of pain. Reduced command following.

## 2024-12-01 NOTE — PROGRESS NOTE ADULT - SUBJECTIVE AND OBJECTIVE BOX
[facility] -- Tom Rahman Pannella, Patel, Savella, Goodger, Cohen: Office # [phone]    Follow Up:  Pericardial effusion    Subjective/Observations: Patient seen and examined. Pt more awake, bur remains confused. No complaints of chest pain, dyspnea, palpitations or dizziness. No signs of orthopnea or PND. Tolerating room air. On IV hydration    REVIEW OF SYSTEMS: All other review of systems are negative unless indicated above    PAST MEDICAL & SURGICAL HISTORY:  Diabetes Mellitus Type II  Insulin pump (2010)      GERD (gastroesophageal reflux disease)      Depression      Hypothyroidism      Hypothyroidism      Hyperlipidemia      Kidney transplanted  2012      Hypertension      Hypertension      ANGELIKA (obstructive sleep apnea)      Peripheral neuropathy      Shoulder fracture  Left.      History of colonoscopy      S/P kidney transplant  2012      S/P cholecystectomy      Retroperitoneal fibrosis  s/p surgery      AV fistula  Right arm      S/P right cataract extraction      S/P left cataract extraction      H/O unilateral nephrectomy  Left    MEDICATIONS  (STANDING):  amLODIPine   Tablet 10 milliGRAM(s) Oral daily  ascorbic acid 500 milliGRAM(s) Oral two times a day  aspirin enteric coated 81 milliGRAM(s) Oral daily  azaTHIOprine 50 milliGRAM(s) Oral two times a day  buPROPion XL (24-Hour) . 300 milliGRAM(s) Oral daily  carvedilol 12.5 milliGRAM(s) Oral every 12 hours  cholecalciferol 1000 Unit(s) Oral daily  dextrose 5%. 1000 milliLiter(s) (50 mL/Hr) IV Continuous <Continuous>  dextrose 5%. 1000 milliLiter(s) (100 mL/Hr) IV Continuous <Continuous>  dextrose 50% Injectable 25 Gram(s) IV Push once  dextrose 50% Injectable 12.5 Gram(s) IV Push once  dextrose 50% Injectable 25 Gram(s) IV Push once  ertapenem  IVPB      escitalopram 10 milliGRAM(s) Oral <User Schedule>  ferrous    sulfate 325 milliGRAM(s) Oral two times a day  glucagon  Injectable 1 milliGRAM(s) IntraMuscular once  hydrALAZINE 100 milliGRAM(s) Oral three times a day  insulin glargine Injectable (LANTUS) 17 Unit(s) SubCutaneous at bedtime  insulin lispro (ADMELOG) corrective regimen sliding scale   SubCutaneous three times a day before meals  levothyroxine 88 MICROGram(s) Oral <User Schedule>  lisinopril 20 milliGRAM(s) Oral daily  mineral oil enema 133 milliLiter(s) Rectal once  polyethylene glycol 3350 17 Gram(s) Oral daily  potassium phosphate IVPB 30 milliMole(s) IV Intermittent once  pyridoxine 50 milliGRAM(s) Oral daily  rosuvastatin 10 milliGRAM(s) Oral at bedtime  senna 2 Tablet(s) Oral at bedtime  sodium chloride 0.9%. 1000 milliLiter(s) (150 mL/Hr) IV Continuous <Continuous>  tacrolimus 2 milliGRAM(s) Oral daily  tacrolimus 1 milliGRAM(s) Oral at bedtime    MEDICATIONS  (PRN):  acetaminophen   IVPB .. 1000 milliGRAM(s) IV Intermittent every 8 hours PRN Temp greater or equal to 38C (100.4F)  aluminum hydroxide/magnesium hydroxide/simethicone Suspension 30 milliLiter(s) Oral every 4 hours PRN Dyspepsia  dextrose Oral Gel 15 Gram(s) Oral once PRN Blood Glucose LESS THAN 70 milliGRAM(s)/deciliter  hydrALAZINE Injectable 10 milliGRAM(s) IV Push every 8 hours PRN SBP >180 and HR 60-70bpm  melatonin 3 milliGRAM(s) Oral at bedtime PRN Insomnia  metoprolol tartrate Injectable 5 milliGRAM(s) IV Push every 6 hours PRN SBP >170  ondansetron Injectable 4 milliGRAM(s) IV Push every 8 hours PRN Nausea and/or Vomiting    Allergies  No Known Allergies    Vital Signs Last 24 Hrs  T(C): 37.1 (30 Nov 2024 20:36), Max: 37.1 (30 Nov 2024 20:36)  T(F): 98.8 (30 Nov 2024 20:36), Max: 98.8 (30 Nov 2024 20:36)  HR: 85 (01 Dec 2024 05:08) (82 - 97)  BP: 184/65 (01 Dec 2024 05:08) (157/69 - 187/64)  BP(mean): --  RR: 18 (01 Dec 2024 05:08) (18 - 18)  SpO2: 94% (01 Dec 2024 05:08) (91% - 94%)    Parameters below as of 01 Dec 2024 05:08  Patient On (Oxygen Delivery Method): room air      I&O's Summary    30 Nov 2024 07:01  -  01 Dec 2024 07:00  --------------------------------------------------------  IN: 0 mL / OUT: 1950 mL / NET: -1950 mL        TELE:  70-80s   PHYSICAL EXAM:  Constitutional: NAD, awake and alert  HEENT: Moist Mucous Membranes, Anicteric  Pulmonary: Non-labored, breath sounds are clear bilaterally, No wheezing, rales or rhonchi  Cardiovascular: Regular, S1 and S2, No murmurs, No rubs, gallops or clicks  Gastrointestinal:  soft, nontender, nondistended   Lymph: No peripheral edema. No lymphadenopathy.   Skin: No visible rashes or ulcers.  Psych:  confused       LABS: All Labs Reviewed:                        7.3    4.72  )-----------( 228      ( 01 Dec 2024 05:55 )             23.0                         6.9    5.06  )-----------( 218      ( 30 Nov 2024 12:50 )             21.8                         6.8    x     )-----------( x        ( 30 Nov 2024 10:20 )             21.7     01 Dec 2024 05:55    144    |  112    |  23     ----------------------------<  204    3.7     |  29     |  1.00   30 Nov 2024 06:40    144    |  110    |  25     ----------------------------<  171    3.2     |  28     |  1.10   29 Nov 2024 07:38    140    |  102    |  22     ----------------------------<  229    3.8     |  30     |  0.98     Ca    11.7       01 Dec 2024 05:55  Ca    11.1       30 Nov 2024 06:40  Ca    12.2       29 Nov 2024 07:38  Phos  1.6       01 Dec 2024 05:55  Phos  2.6       30 Nov 2024 06:40  Phos  3.2       29 Nov 2024 17:10  Mg     1.8       01 Dec 2024 05:55  Mg     1.3       30 Nov 2024 06:40    TPro  6.8    /  Alb  2.1    /  TBili  0.6    /  DBili  x      /  AST  59     /  ALT  45     /  AlkPhos  106    01 Dec 2024 05:55  TPro  6.9    /  Alb  x      /  TBili  x      /  DBili  x      /  AST  x      /  ALT  x      /  AlkPhos  x      29 Nov 2024 17:10  TPro  7.6    /  Alb  2.4    /  TBili  0.9    /  DBili  x      /  AST  47     /  ALT  44     /  AlkPhos  116    29 Nov 2024 07:38   LIVER FUNCTIONS - ( 01 Dec 2024 05:55 )  Alb: 2.1 g/dL / Pro: 6.8 g/dL / ALK PHOS: 106 U/L / ALT: 45 U/L / AST: 59 U/L / GGT: x           Lactate, Blood: 1.8 mmol/L (11-29-24 @ 07:15)    12 Lead ECG:   Ventricular Rate 76 BPM    Atrial Rate 76 BPM    P-R Interval 156 ms    QRS Duration 100 ms    Q-T Interval 374 ms    QTC Calculation(Bazett) 420 ms    P Axis 49 degrees    R Axis 56 degrees    T Axis 74 degrees    Diagnosis Line Normal sinus rhythm  Nonspecific T wave abnormality  cannot r/o anterior ischemia   Abnormal ECG  When compared with ECG of 13-NOV-2024 13:52,  Nonspecific T wave abnormality now evident in Lateral leads  Confirmed by SHARON BORGES ([id number]) on 11/29/2024 5:29:30 PM (11-29-24 @ 07:11)      TRANSTHORACIC ECHOCARDIOGRAM REPORT  ________________________________________________________________________________                                      _______       Pt. Name:       JAN CALVIN Study Date:    11/29/2024  MRN:            [id number]          YOB: 1955  Accession #:    [id number]         Age:           69 years  Account#:       [id number]        Gender:        M  Heart Rate:                       Height:        64.17 in (163.00 cm)  Rhythm:       Weight:        169.75 lb (77.00 kg)  Blood Pressure: 196/81 mmHg       BSA/BMI:       1.83 m² / 28.98 kg/m²  ________________________________________________________________________________________  Referring Physician:    [id number] Ale Ibrahim  Interpreting Physician: Kya Gonzalez MD  Primary Sonographer:    Butch Salazar    CPT:               ECHO TTE WO CON COMP W DOPP - 38703.m  Indication(s):     Cardiac murmur, unspecified - R01.1  Procedure:         Transthoracic echocardiogram with 2-D, M-mode and complete                     spectral and color flow Doppler.  Ordering Location: [facility]  Admission Status:  ED    _______________________________________________________________________________________     CONCLUSIONS:      1. Left ventricular cavity is normal in size. Left ventricular systolic function is normal with an ejection fraction of 60 % by Moreno's method of disks.   2. Trace pericardial effusion noted adjacent to the posterior left ventricle.   3. Normal right ventricular cavity size and normal right ventricular systolic function.   4. Aortic valve anatomy cannot be determined with normal systolic excursion. There is calcification of the aortic valve leaflets.   5. Structurally normal mitral valve with normalleaflet excursion.   6. Structurally normal tricuspid valve with normal leaflet excursion. Trace tricuspid regurgitation.   7. The inferior vena cava is normal in size measuring 1.36 cm in diameter, (normal <2.1cm) with normal inspiratory collapse (normal >50%) consistent with normal right atrial pressure (~3, range 0-5mmHg).    ________________________________________________________________________________________  FINDINGS:     Left Ventricle:  The left ventricular cavity is normal in size. Left ventricular systolic function is normal with a calculated ejection fraction of 60 % by the Moreno's biplane method of disks.     Right Ventricle:  The right ventricular cavity is normal in size and right ventricular systolic function is normal. Tricuspid annular plane systolic excursion (TAPSE) is 2.9 cm (normal >=1.7 cm).     Left Atrium:  The left atrium is normal in size with an indexed volume of 33.15 ml/m².     Right Atrium:  The right atrium is normal in size with an indexed volume of 21.17 ml/m².     Interatrial Septum:  The interatrial septum appears intact.     Aortic Valve:  The aortic valve anatomy cannot be determined with normal systolic excursion. There is calcification of the aortic valve leaflets. The peak transaortic velocity is 2.15 m/s, peak transaortic gradient is 18.5 mmHg and mean transaortic gradient is 11.0 mmHg with an LVOT/aortic valve VTI ratio of 0.64. The aortic valve area is estimated at 2.67 cm² by the continuity equation. There is no evidence of aortic regurgitation.     Mitral Valve:  Structurally normal mitral valve with normal leaflet excursion. There is calcification of the mitral valve annulus.     Tricuspid Valve:  Structurally normal tricuspid valve with normal leaflet excursion. There is trace tricuspid regurgitation. Estimated pulmonary artery systolic pressure is 8 mmHg.     Aorta:  The aortic root at the sinuses of Valsalva is normal in size, measuring 3.50 cm (indexed 1.91 cm/m²). The ascending aorta is dilated, measuring 4.10 cm (indexed 2.24 cm/m²).     Pericardium:  There is a trace pericardial effusion noted adjacent to the posterior left ventricle.     Systemic Veins:  The inferior vena cava is normal in size measuring 1.36 cm in diameter, (normal <2.1cm) with normal inspiratory collapse (normal >50%) consistent with normal right atrial pressure (~3, range 0-5mmHg).  ____________________________________________________________________  QUANTITATIVE DATA:  Left Ventricle Measurements: (Indexed to BSA)     IVSd (2D):   1.0 cm  LVPWd (2D):  1.0 cm  LVIDd (2D):  5.3 cm  LVIDs (2D):  3.6 cm  LV Mass:     203 g  111.2 g/m²  LV Vol d, MOD A2C: 97.8 ml  53.50 ml/m²  LV Vol d, MOD A4C: 121.0 ml 66.19 ml/m²  LV Vol d, MOD BP:  109.5 ml 59.90 ml/m²  LV Vol s, MOD A2C: 36.4 ml  19.91 ml/m²  LV Vol s, MOD A4C: 49.5 ml  27.08 ml/m²  LV Vol s, MOD BP:  44.0 ml  24.04 ml/m²  LVOT SV MOD BP:    65.5 ml  LV EF% MOD BP:     60 %     MV E Vmax:    1.02 m/s  MV A Vmax:    0.93 m/s  MV E/A:       1.10  e' lateral:   13.10 cm/s  e' medial:    9.25 cm/s  E/e' lateral: 7.79  E/e' medial:  11.03  E/e' Average: 9.13  MV DT:        151 msec    Aorta Measurements: (Normal range) (Indexed to BSA)     Ao Root d     3.50 cm (3.1 - 3.7 cm) 1.91 cm/m²  Ao Asc d, 2D: 4.10  Ao Asc prox:  4.10 cm               2.24 cm/m²            Left Atrium Measurements: (Indexed to BSA)  LA Diam 2D: 4.40 cm         Right Ventricle Measurements: Right Atrial Measurements:     TAPSE:            2.9 cm      RA Vol:            38.70 ml  RV Base (RVID1):  3.7cm      RA Vol s, MOD A4C  38.7 ml  RV Mid (RVID2):   3.1 cm      RA Vol Index:      21.17 ml/m²  RV Major (RVID3): 8.0 cm      RA Area s, MOD A4C 14.7 cm²       LVOT / RVOT/ Qp/Qs Data: (Indexed to BSA)  LVOT Diameter:  2.30 cm  LVOT Area:      4.15cm²  LVOT Vmax:      1.34 m/s  LVOT Vmn:       0.949 m/s  LVOT VTI:       26.30 cm  LVOT peak grad: 7 mmHg  LVOT mean grad: 4.0 mmHg  LVOT SV:        109.3 ml  59.78 ml/m²    Aortic Valve Measurements:  AV Vmax:                2.2 m/s  AV Peak Gradient:       18.5 mmHg  AV Mean Gradient:       11.0 mmHg  AV VTI:                 41.0 cm  AV VTI Ratio:           0.64  AoV EOA, Contin:        2.67 cm²  AoV EOA, Contin i:      1.46 cm²/m²  AoV Dimensionless Index 0.64    Mitral Valve Measurements:     MV E Vmax: 1.0 m/s  MV A Vmax: 0.9 m/s  MV E/A:    1.1       Tricuspid Valve Measurements:     TR Vmax:          1.2 m/s  TR Peak Gradient: 5.3 mmHg  RA Pressure:      3 mmHg  PASP:             8 mmHg    ________________________________________________________________________________________  Electronically signed on 11/30/2024 at 1:57:15 PM by Kya Gonzalez MD  *** Final ***

## 2024-12-01 NOTE — PROGRESS NOTE ADULT - ASSESSMENT
Anemia multifactorial in etiology related to prior renal transplant with meds and now with osteomyelitis and sepsis with Gram neg maury sepsis  Blood and urine culture with E. Coli  hgb 7.3 today after receiving 1 unit PRBC  expect to be transfusion dependent with active infection    Recommendations:  1.  follow CBC and transfuse as indicated  2.  with prior renal transplant would use irradiated PRBC and try to keep transfusions to a minimum. hold transfusion today  4.  continue renal evaluation. note calcium increased 11.7  5.  ID evaluation and management  6.  orthopedic followup  7.  further heme recommendations pending above    discussed with Dr. Tubbs

## 2024-12-01 NOTE — SWALLOW BEDSIDE ASSESSMENT ADULT - SWALLOW EVAL: RECOMMENDED FEEDING/EATING TECHNIQUES
slow rate of intake/alternate food with liquid/maintain upright posture during/after eating for 30 mins/oral hygiene/position upright (90 degrees)/small sips/bites

## 2024-12-01 NOTE — SWALLOW BEDSIDE ASSESSMENT ADULT - SWALLOW EVAL: DIAGNOSIS
Pt presenting with signs/symptoms of oral dysphagia, unable to r/o chronic component of pharyngeal phase dysphagia (identified on [facility] 9/2023). Pt required verbal encouragement for participation in trials, but self-fed PO trials of thin liquids by straw and whole/regular solids. Oral stage marked by prolonged mastication, appearing overtly disorganized with respect to mastication pattern. Mild oral stasis post-swallow. No overt signs/symptoms penetration or aspiration, even with serial sips. Recommend continuation of modified diet to optimize safety and efficiency in the setting of altered cognition and current medical acuity. SLP to follow for re-assessment to monitor clinical tolerance and if further objective assessment is warranted, as well as potential diet advancement.

## 2024-12-01 NOTE — PROGRESS NOTE ADULT - ASSESSMENT
Patient is a 70yo M, PMH DM, HTN, HLD, h/o kidney transplant, hypothyroidism, presents to ED from [facility] for AMS likely metabolic encephalopathy       AMS 2/2 Sepsis 2/2 multiple infections (UTI, sacral infection, possible osteomyelitis), E coli bacteremia   On Meropenum. ID Dr. Saul    S/p IVF   Tylenol PRN pain and fever  continue home meds  Per Ortho no surgical intervention   ortho recs re: sacral pathologic fractures: no surgical intervention   ID Dr. Saul   diet as tolerated   aspiration and fall risk  remote tele  Blood Cultures + for E coli. F/u sensitivities, f/u repeat BC   Continue Senna, Miralax, PRN dulcolax suppositories   zofran PRN n/v  MR pelvis/sacrum to eval for osteomyelitis, may need bone culture to further eval    Acute on Chronic anemia :  - Likely due to infection, IVF ( dilutional component   - No signs of bleeding noted   -Check Iron/ TIBC, Ferritin   -Check Stool for OB  - D/w Hem Dr. Sal and Nephro Dr. Bruce, Will  monitor for now. Patient is not hypotensive or tachycardic.   -Will transfuse for Hb < 7.0. D/e Hem, Nephro       Hepatic lesion  CT abd with 5.5cm lesion on R hepatic lobe  f/u MR abdomen to further evaluate    Diabetes Mellitus  Diet  Pre meal Insulin   Lantus to 17u QHS (from home dose 35u, increase as tolerated according to fingerstick).  fingerstick glucose monitoring Q6h  ISS  A1c 7.4     HTN: Uncontrolled   Continue Lisinopril 20mg daily with hold parameters  Continue Hydralazine 100mg TID with hold parameters  Continue Coreg, switched from Metoprolol. Will increase dose, since BP still elevated. D/w cardio   Continue Amlodipine 10mg daily    HLD  Continue Crestor 10mg daily    s/p Kidney transplant  Continue Tacrolimus 2mg daily  Continue Tacrolimus 1mg QHS  Continue Azathioprine 50mg BID  Nephrology consulted     Hypothyroidism  Continue Levothyroxine 88mcg QAM    Depression  Continue Escitalopram 10mg TID  Continue Bupropion 300mg daily    DVT ppx: Hold AC due to anemia and risk of bleeding   Dispo: DC planning pending hospital course

## 2024-12-01 NOTE — SWALLOW BEDSIDE ASSESSMENT ADULT - COMMENTS
Order received for clinical swallow evaluation. Chart reviewed, events noted. Pt received upright in bed resting but easily roused to gentle stimuli. Oriented to self only. Verbal encouragement required to increase participation in assessment. No verbal or non-verbal indicators of pain. Respiratory status appearing stable on room air. Clinical swallow eval completed. Please see report for further details. Pt left as received in NAD. NURA Roman (verbally/in-person) & Dr. Tubbs (TEAMS) notified. SLP to follow.    Imaging:  CT Chest 11/29/24: "Pathologic fractures of the bilateral sacrum with mottled sclerosis/osteolysis, presacral/posterior pelvic extraperitoneal soft tissue stranding and small bubbles of gas/air; findings suggestive of infection/osteomyelitis. 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 Low-density lesion right hepatic lobe, which is poorly characterized on this noncontrast exam. Recommend further evaluation with MRI. Bladder wall thickening, correlate for cystitis. Small left-sided pleural effusion with small loculated component. Partial left lower lobe atelectasis."

## 2024-12-01 NOTE — SWALLOW BEDSIDE ASSESSMENT ADULT - H & P REVIEW
Per charting, "68yo M, PMH DM, HTN, HLD, h/o kidney transplant, hypothyroidism, presents to ED from [facility] for AMS. Patient is lethargic and unable to provide history at this time. Information obtained from transfer record and chart."/yes

## 2024-12-01 NOTE — PROGRESS NOTE ADULT - ASSESSMENT
CKD 3, h/o Kidney transplant ~2012, h/o Left Nephrectomy  Diabetes  Hypertension  Sepsis, UTI, Sacral osteomyelitis, Gram negative bacteremia  Hypokalemia  Hypercalcemia  Hypomagnesemia    11/30/24: IV hydration. Potassium and magnesium supplementation. Work up for hypercalcemia as ordered. To continue preadmission immuno suppressants.   Monitor blood sugar levels. Insulin coverage as needed. Dietary restriction. Trend BP and titrate BP meds as needed.   Avoid nephrotoxic meds as possible. IV abx, ID follow up. Will follow electrolytes and renal function trend.   12/01/24: Lower IVF. Trend BP and titrate BP meds as needed. To continue current meds.  CKD 3, h/o Kidney transplant ~2012, h/o Left Nephrectomy  Diabetes  Hypertension  Sepsis, UTI, Sacral osteomyelitis, Gram negative bacteremia  Hypokalemia  Hypercalcemia  Hypomagnesemia  Hypophosphatemia    11/30/24: IV hydration. Potassium and magnesium supplementation. Work up for hypercalcemia as ordered. To continue preadmission immuno suppressants.   Monitor blood sugar levels. Insulin coverage as needed. Dietary restriction. Trend BP and titrate BP meds as needed.   Avoid nephrotoxic meds as possible. IV abx, ID follow up. Will follow electrolytes and renal function trend.   12/01/24: Lower IVF. Trend BP and titrate BP meds as needed. To continue current meds. Phosphorous supplementation. IV abx, ID follow up.  CKD 3, h/o Kidney transplant ~2012, h/o Left Nephrectomy  Diabetes  Hypertension  Sepsis, UTI, Sacral osteomyelitis, Gram negative bacteremia  Hypokalemia  Hypercalcemia  Hypomagnesemia  Hypophosphatemia    11/30/24: IV hydration. Potassium and magnesium supplementation. Work up for hypercalcemia as ordered. To continue preadmission immuno suppressants.   Monitor blood sugar levels. Insulin coverage as needed. Dietary restriction. Trend BP and titrate BP meds as needed.   Avoid nephrotoxic meds as possible. IV abx, ID follow up. Will follow electrolytes and renal function trend.   12/01/24: Lower IVF. Trend BP and titrate BP meds as needed. To continue current meds. Phosphorous supplementation. IV abx, ID follow up. Discussed with patients wife at the bedside.

## 2024-12-01 NOTE — PROGRESS NOTE ADULT - SUBJECTIVE AND OBJECTIVE BOX
Patient is a 69y old  Male who presents with a chief complaint of AMS (30 Nov 2024 17:08)       INTERVAL HPI/OVERNIGHT EVENTS: Patient seen and examined at bedside. Awake, alert. Denies chest pain, palpitations, sob. Answers some of the questions     MEDICATIONS  (STANDING):  amLODIPine   Tablet 10 milliGRAM(s) Oral daily  ascorbic acid 500 milliGRAM(s) Oral two times a day  aspirin enteric coated 81 milliGRAM(s) Oral daily  azaTHIOprine 50 milliGRAM(s) Oral two times a day  buPROPion XL (24-Hour) . 300 milliGRAM(s) Oral daily  carvedilol 6.25 milliGRAM(s) Oral every 12 hours  cholecalciferol 1000 Unit(s) Oral daily  dextrose 5%. 1000 milliLiter(s) (50 mL/Hr) IV Continuous <Continuous>  dextrose 5%. 1000 milliLiter(s) (100 mL/Hr) IV Continuous <Continuous>  dextrose 50% Injectable 25 Gram(s) IV Push once  dextrose 50% Injectable 12.5 Gram(s) IV Push once  dextrose 50% Injectable 25 Gram(s) IV Push once  ertapenem  IVPB      escitalopram 10 milliGRAM(s) Oral <User Schedule>  ferrous    sulfate 325 milliGRAM(s) Oral two times a day  glucagon  Injectable 1 milliGRAM(s) IntraMuscular once  hydrALAZINE 100 milliGRAM(s) Oral three times a day  insulin glargine Injectable (LANTUS) 17 Unit(s) SubCutaneous at bedtime  insulin lispro (ADMELOG) corrective regimen sliding scale   SubCutaneous three times a day before meals  levothyroxine 88 MICROGram(s) Oral <User Schedule>  lisinopril 20 milliGRAM(s) Oral daily  mineral oil enema 133 milliLiter(s) Rectal once  polyethylene glycol 3350 17 Gram(s) Oral daily  potassium phosphate IVPB 30 milliMole(s) IV Intermittent once  pyridoxine 50 milliGRAM(s) Oral daily  rosuvastatin 10 milliGRAM(s) Oral at bedtime  senna 2 Tablet(s) Oral at bedtime  sodium chloride 0.9%. 1000 milliLiter(s) (150 mL/Hr) IV Continuous <Continuous>  tacrolimus 2 milliGRAM(s) Oral daily  tacrolimus 1 milliGRAM(s) Oral at bedtime    MEDICATIONS  (PRN):  acetaminophen   IVPB .. 1000 milliGRAM(s) IV Intermittent every 8 hours PRN Temp greater or equal to 38C (100.4F)  aluminum hydroxide/magnesium hydroxide/simethicone Suspension 30 milliLiter(s) Oral every 4 hours PRN Dyspepsia  dextrose Oral Gel 15 Gram(s) Oral once PRN Blood Glucose LESS THAN 70 milliGRAM(s)/deciliter  hydrALAZINE Injectable 10 milliGRAM(s) IV Push every 8 hours PRN SBP >180 and HR 60-70bpm  melatonin 3 milliGRAM(s) Oral at bedtime PRN Insomnia  metoprolol tartrate Injectable 5 milliGRAM(s) IV Push every 6 hours PRN SBP >170  ondansetron Injectable 4 milliGRAM(s) IV Push every 8 hours PRN Nausea and/or Vomiting      Allergies    No Known Allergies    Intolerances        REVIEW OF SYSTEMS:  Per HPI. All other ROS noted Negative   Vital Signs Last 24 Hrs  T(C): 37.1 (30 Nov 2024 20:36), Max: 37.1 (30 Nov 2024 20:36)  T(F): 98.8 (30 Nov 2024 20:36), Max: 98.8 (30 Nov 2024 20:36)  HR: 85 (01 Dec 2024 05:08) (82 - 97)  BP: 184/65 (01 Dec 2024 05:08) (157/69 - 187/64)  BP(mean): --  RR: 18 (01 Dec 2024 05:08) (18 - 18)  SpO2: 94% (01 Dec 2024 05:08) (91% - 94%)    Parameters below as of 01 Dec 2024 05:08  Patient On (Oxygen Delivery Method): room air        PHYSICAL EXAM:  General: NAD, alert   Head: normocephalic, atraumatic  Eyes: anicteric  ENT: moist mucous membranes, no pharyngeal exudates  Heart: rrr, S1, S2, poss I/VI murmur  Chest: CTA b/l, no rales, rhonchi, or wheezes  Abd: BS+, soft, NT, ND, old midline abd scar   Back: no CVA tenderness  Extr: no edema or cyanosis  Skin: cherry angiomas diffusely    LABS:                        7.3    4.72  )-----------( 228      ( 01 Dec 2024 05:55 )             23.0     01 Dec 2024 05:55    144    |  112    |  23     ----------------------------<  204    3.7     |  29     |  1.00     Ca    11.7       01 Dec 2024 05:55  Phos  1.6       01 Dec 2024 05:55  Mg     1.8       01 Dec 2024 05:55    TPro  6.8    /  Alb  2.1    /  TBili  0.6    /  DBili  x      /  AST  59     /  ALT  45     /  AlkPhos  106    01 Dec 2024 05:55      CAPILLARY BLOOD GLUCOSE      POCT Blood Glucose.: 205 mg/dL (01 Dec 2024 07:37)  POCT Blood Glucose.: 222 mg/dL (30 Nov 2024 22:46)  POCT Blood Glucose.: 240 mg/dL (30 Nov 2024 21:26)  POCT Blood Glucose.: 268 mg/dL (30 Nov 2024 16:57)  POCT Blood Glucose.: 162 mg/dL (30 Nov 2024 11:50)    BLOOD CULTURE  11-29 @ 11:00   50,000 - 99,000 CFU/mL Escherichia coli  --  --  11-29 @ 07:15   Growth in aerobic and anaerobic bottles: Escherichia coli  --  --  11-29 @ 07:10   Growth in aerobic and anaerobic bottles: Escherichia coli  Direct identification is available within approximately 3-5  hours either by Blood Panel Multiplexed PCR or Direct  MALDI-TOF. Details: [email or web address]  --  Blood Culture PCR    RADIOLOGY & ADDITIONAL TESTS:    Imaging Personally Reviewed:  [ ] YES     Consultant(s) Notes Reviewed:      Care Discussed with Consultants/Other Providers:

## 2024-12-01 NOTE — PROGRESS NOTE ADULT - ASSESSMENT
70yo M, PMH DM, HTN, HLD, h/o kidney transplant, hypothyroidism, presents to ED from [facility] for AMS, found to be febrile with positive UA. 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 Cardiology called for pericardial effusion.     - Ct revealed Small left pleural effusion with small loculated components, Small to moderate pericardial effusion. Approximately 5.5 cm low-density lesion anterior right hepatic lobe. Atrophic right kidney. Absent left kidney. 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 Colonic fecal retention. Stercoral proctitis. Patchy sclerosis and osteolysis throughout the bilateral sacrum with pathologic fractures. There is soft tissue with stranding extending throughout the presacral and posterior extraperitoneal pelvic soft tissues. There are a few bubbles of air/gas at the right sacral pathologic fracture, findings suggestive of infection/osteomyelitis.   - Management per primary team   - Orthopedic seen, f/u MRI LSp/Pelvis,   - No acute orthopedic surgical intervention at this time.  - ID and surgery following   - Continue antibiotics     - Please obtain transthoracic echocardiogram to assess the pericardial effusion and to evaluate signs of tamponade  - Pt w/no signs of tamponade on examination    - EKG with nonspecific TWI  - Avoid anticoagulants   - IV fluids as needed. Avoid reducing preload   - No evidence of any meaningful volume overload     - EKG with nonspecific TWI anteriorly. F/u repeat EKG   - Tele w/ SR 70-80s, no events, can d/c telemetry  - No evidence of any active ischemia   - Continue aspirin and statin     - -180s   - Continue Norvasc 10, Hydralazine 100 tid, [id number] BID and, lisinopril 20  - Up titrate coreg for BP control     - Hemoglobin <--7.3, <--6.9, <--6.8  - Hematocrit <--23.0, <--21.8, <--21.7  - Trend and transfuse per primary    - Monitor and replete lytes, keep K>4, Mg>2.  - Will continue to follow.    Robin Cordoba, MS FNP, AGAP  Nurse Practitioner- Cardiology   Please call on TEAMS

## 2024-12-01 NOTE — PROGRESS NOTE ADULT - SUBJECTIVE AND OBJECTIVE BOX
[facility]                                                       Dr. Bruce, Dr. Prabhakar, Dr. Cabrales, Dr. Zaragoza, Dr. Bingham, Dr. Loya                                      [facility], [facility], [address]                                                 [address] [address]                                      Ph: [phone]  Fax: [phone]                                         Ph: [phone]  Fax: [phone]      Patient is a 69y old  Male who presents with a chief complaint of AMS (01 Dec 2024 11:19)  Patient seen in follow up for CKD, h/o Kidney transplant.        PAST MEDICAL HISTORY:  Diabetes Mellitus Type II    ESRD on Dialysis    GERD (gastroesophageal reflux disease)    Depression    Hypothyroidism    Hyperlipidemia    Kidney transplanted    Hypertension    ANGELIKA (obstructive sleep apnea)    Peripheral neuropathy    Shoulder fracture      MEDICATIONS  (STANDING):  amLODIPine   Tablet 10 milliGRAM(s) Oral daily  ascorbic acid 500 milliGRAM(s) Oral two times a day  aspirin enteric coated 81 milliGRAM(s) Oral daily  azaTHIOprine 50 milliGRAM(s) Oral two times a day  buPROPion XL (24-Hour) . 300 milliGRAM(s) Oral daily  carvedilol 12.5 milliGRAM(s) Oral every 12 hours  cholecalciferol 1000 Unit(s) Oral daily  dextrose 5%. 1000 milliLiter(s) (50 mL/Hr) IV Continuous <Continuous>  dextrose 5%. 1000 milliLiter(s) (100 mL/Hr) IV Continuous <Continuous>  dextrose 50% Injectable 25 Gram(s) IV Push once  dextrose 50% Injectable 12.5 Gram(s) IV Push once  dextrose 50% Injectable 25 Gram(s) IV Push once  ertapenem  IVPB      escitalopram 10 milliGRAM(s) Oral <User Schedule>  ferrous    sulfate 325 milliGRAM(s) Oral two times a day  glucagon  Injectable 1 milliGRAM(s) IntraMuscular once  hydrALAZINE 100 milliGRAM(s) Oral three times a day  insulin glargine Injectable (LANTUS) 17 Unit(s) SubCutaneous at bedtime  insulin lispro (ADMELOG) corrective regimen sliding scale   SubCutaneous three times a day before meals  levothyroxine 88 MICROGram(s) Oral <User Schedule>  lisinopril 20 milliGRAM(s) Oral daily  mineral oil enema 133 milliLiter(s) Rectal once  polyethylene glycol 3350 17 Gram(s) Oral daily  pyridoxine 50 milliGRAM(s) Oral daily  rosuvastatin 10 milliGRAM(s) Oral at bedtime  senna 2 Tablet(s) Oral at bedtime  sodium chloride 0.45%. 1000 milliLiter(s) (50 mL/Hr) IV Continuous <Continuous>  tacrolimus 2 milliGRAM(s) Oral daily  tacrolimus 1 milliGRAM(s) Oral at bedtime    MEDICATIONS  (PRN):  acetaminophen   IVPB .. 1000 milliGRAM(s) IV Intermittent every 8 hours PRN Temp greater or equal to 38C (100.4F)  aluminum hydroxide/magnesium hydroxide/simethicone Suspension 30 milliLiter(s) Oral every 4 hours PRN Dyspepsia  dextrose Oral Gel 15 Gram(s) Oral once PRN Blood Glucose LESS THAN 70 milliGRAM(s)/deciliter  hydrALAZINE Injectable 10 milliGRAM(s) IV Push every 8 hours PRN SBP >180 and HR 60-70bpm  melatonin 3 milliGRAM(s) Oral at bedtime PRN Insomnia  metoprolol tartrate Injectable 5 milliGRAM(s) IV Push every 6 hours PRN SBP >170  ondansetron Injectable 4 milliGRAM(s) IV Push every 8 hours PRN Nausea and/or Vomiting    T(C): 36.9 (12-01-24 @ 11:30), Max: 38.2 (11-30-24 @ 04:12)  HR: 82 (12-01-24 @ 11:30) (82 - 97)  BP: 181/74 (12-01-24 @ 11:30) (157/69 - 187/64)  RR: 18 (12-01-24 @ 11:30) (18 - 18)  SpO2: 94% (12-01-24 @ 11:30) (91% - 94%)  Wt(kg): --  I&O's Detail    30 Nov 2024 07:01  -  01 Dec 2024 07:00  --------------------------------------------------------  IN:  Total IN: 0 mL    OUT:    Indwelling Catheter - Urethral (mL): 700 mL    Voided (mL): 1250 mL  Total OUT: 1950 mL    Total NET: -1950 mL      01 Dec 2024 07:01  -  01 Dec 2024 12:39  --------------------------------------------------------  IN:    sodium chloride 0.9%: 600 mL  Total IN: 600 mL    OUT:  Total OUT: 0 mL    Total NET: 600 mL          PHYSICAL EXAM:  General: No distress  Respiratory: b/l air entry  Cardiovascular: S1 S2  Gastrointestinal: soft  Extremities:  no edema                              7.3    4.72  )-----------( 228      ( 01 Dec 2024 05:55 )             23.0     12-01    144  |  112[H]  |  23  ----------------------------<  204[H]  3.7   |  29  |  1.00    Ca    11.7[H]      01 Dec 2024 05:55  Phos  1.6     12-01  Mg     1.8     12-01    TPro  6.8  /  Alb  2.1[L]  /  TBili  0.6  /  DBili  x   /  AST  59[H]  /  ALT  45  /  AlkPhos  106  12-01        LIVER FUNCTIONS - ( 01 Dec 2024 05:55 )  Alb: 2.1 g/dL / Pro: 6.8 g/dL / ALK PHOS: 106 U/L / ALT: 45 U/L / AST: 59 U/L / GGT: x           Urinalysis Basic - ( 01 Dec 2024 05:55 )    Color: x / Appearance: x / SG: x / pH: x  Gluc: 204 mg/dL / Ketone: x  / Bili: x / Urobili: x   Blood: x / Protein: x / Nitrite: x   Leuk Esterase: x / RBC: x / WBC x   Sq Epi: x / Non Sq Epi: x / Bacteria: x        Sodium, Serum: 144 (12-01 @ 05:55)  Sodium, Serum: 144 (11-30 @ 06:40)  Sodium, Serum: 140 (11-29 @ 07:38)    Creatinine, Serum: 1.00 (12-01 @ 05:55)  Creatinine, Serum: 1.10 (11-30 @ 06:40)  Creatinine, Serum: 0.98 (11-29 @ 07:38)    Potassium, Serum: 3.7 (12-01 @ 05:55)  Potassium, Serum: 3.2 (11-30 @ 06:40)  Potassium, Serum: 3.8 (11-29 @ 07:38)    Hemoglobin: 7.3 (12-01 @ 05:55)  Hemoglobin: 6.9 (11-30 @ 12:50)  Hemoglobin: 6.8 (11-30 @ 10:20)  Hemoglobin: 6.4 (11-30 @ 06:40)

## 2024-12-01 NOTE — PROGRESS NOTE ADULT - SUBJECTIVE AND OBJECTIVE BOX
Interval History:  remains weak  Chart reviewed and events noted;   Overnight events:    MEDICATIONS  (STANDING):  amLODIPine   Tablet 10 milliGRAM(s) Oral daily  ascorbic acid 500 milliGRAM(s) Oral two times a day  aspirin enteric coated 81 milliGRAM(s) Oral daily  azaTHIOprine 50 milliGRAM(s) Oral two times a day  buPROPion XL (24-Hour) . 300 milliGRAM(s) Oral daily  carvedilol 12.5 milliGRAM(s) Oral every 12 hours  cholecalciferol 1000 Unit(s) Oral daily  dextrose 5%. 1000 milliLiter(s) (50 mL/Hr) IV Continuous <Continuous>  dextrose 5%. 1000 milliLiter(s) (100 mL/Hr) IV Continuous <Continuous>  dextrose 50% Injectable 25 Gram(s) IV Push once  dextrose 50% Injectable 12.5 Gram(s) IV Push once  dextrose 50% Injectable 25 Gram(s) IV Push once  ertapenem  IVPB      escitalopram 10 milliGRAM(s) Oral <User Schedule>  ferrous    sulfate 325 milliGRAM(s) Oral two times a day  glucagon  Injectable 1 milliGRAM(s) IntraMuscular once  hydrALAZINE 100 milliGRAM(s) Oral three times a day  insulin glargine Injectable (LANTUS) 17 Unit(s) SubCutaneous at bedtime  insulin lispro (ADMELOG) corrective regimen sliding scale   SubCutaneous three times a day before meals  levothyroxine 88 MICROGram(s) Oral <User Schedule>  lisinopril 20 milliGRAM(s) Oral daily  mineral oil enema 133 milliLiter(s) Rectal once  polyethylene glycol 3350 17 Gram(s) Oral daily  potassium phosphate / sodium phosphate Tablet (K-PHOS No. 2) 1 Tablet(s) Oral once  pyridoxine 50 milliGRAM(s) Oral daily  rosuvastatin 10 milliGRAM(s) Oral at bedtime  senna 2 Tablet(s) Oral at bedtime  sodium chloride 0.9%. 1000 milliLiter(s) (150 mL/Hr) IV Continuous <Continuous>  tacrolimus 2 milliGRAM(s) Oral daily  tacrolimus 1 milliGRAM(s) Oral at bedtime    MEDICATIONS  (PRN):  acetaminophen   IVPB .. 1000 milliGRAM(s) IV Intermittent every 8 hours PRN Temp greater or equal to 38C (100.4F)  aluminum hydroxide/magnesium hydroxide/simethicone Suspension 30 milliLiter(s) Oral every 4 hours PRN Dyspepsia  dextrose Oral Gel 15 Gram(s) Oral once PRN Blood Glucose LESS THAN 70 milliGRAM(s)/deciliter  hydrALAZINE Injectable 10 milliGRAM(s) IV Push every 8 hours PRN SBP >180 and HR 60-70bpm  melatonin 3 milliGRAM(s) Oral at bedtime PRN Insomnia  metoprolol tartrate Injectable 5 milliGRAM(s) IV Push every 6 hours PRN SBP >170  ondansetron Injectable 4 milliGRAM(s) IV Push every 8 hours PRN Nausea and/or Vomiting      Vital Signs Last 24 Hrs  T(C): 37.1 (30 Nov 2024 20:36), Max: 37.1 (30 Nov 2024 20:36)  T(F): 98.8 (30 Nov 2024 20:36), Max: 98.8 (30 Nov 2024 20:36)  HR: 85 (01 Dec 2024 05:08) (82 - 97)  BP: 184/65 (01 Dec 2024 05:08) (157/69 - 187/64)  BP(mean): --  RR: 18 (01 Dec 2024 05:08) (18 - 18)  SpO2: 94% (01 Dec 2024 05:08) (91% - 94%)    Parameters below as of 01 Dec 2024 05:08  Patient On (Oxygen Delivery Method): room air        PHYSICAL EXAM  General: adult in NAD, chronically ill appearing  HEENT: clear oropharynx, anicteric sclera, pink conjunctivae  Neck: supple  CV: normal S1S2 with no murmur rubs or gallops  Lungs: clear to auscultation, no wheezes, no rhales  Abdomen: soft non-tender non-distended, no hepato/splenomegaly  Ext: no clubbing cyanosis or edema  Skin: no rashes and no petichiae  Neuro: alert and oriented X3 no focal deficits      LABS:  CBC Full  -  ( 01 Dec 2024 05:55 )  WBC Count : 4.72 K/uL  RBC Count : 2.54 M/uL  Hemoglobin : 7.3 g/dL  Hematocrit : 23.0 %  Platelet Count - Automated : 228 K/uL  Mean Cell Volume : 90.6 fl  Mean Cell Hemoglobin : 28.7 pg  Mean Cell Hemoglobin Concentration : 31.7 g/dL  Auto Neutrophil # : 4.11 K/uL  Auto Lymphocyte # : 0.22 K/uL  Auto Monocyte # : 0.29 K/uL  Auto Eosinophil # : 0.05 K/uL  Auto Basophil # : 0.01 K/uL  Auto Neutrophil % : 87.1 %  Auto Lymphocyte % : 4.7 %  Auto Monocyte % : 6.1 %  Auto Eosinophil % : 1.1 %  Auto Basophil % : 0.2 %    12-01    144  |  112[H]  |  23  ----------------------------<  204[H]  3.7   |  29  |  1.00    Ca    11.7[H]      01 Dec 2024 05:55  Phos  1.6     12-01  Mg     1.8     12-01    TPro  6.8  /  Alb  2.1[L]  /  TBili  0.6  /  DBili  x   /  AST  59[H]  /  ALT  45  /  AlkPhos  106  12-01        fe studies  Ferritin: 885 ng/mL (12-01 @ 05:55)  Iron - Total Binding Capacity.: 164 ug/dL (12-01 @ 05:55)      WBC trend  4.72 K/uL (12-01-24 @ 05:55)  5.06 K/uL (11-30-24 @ 12:50)  5.25 K/uL (11-30-24 @ 06:40)  5.07 K/uL (11-29-24 @ 07:15)      Hgb trend  7.3 g/dL (12-01-24 @ 05:55)  6.9 g/dL (11-30-24 @ 12:50)  6.8 g/dL (11-30-24 @ 10:20)  6.4 g/dL (11-30-24 @ 06:40)  9.2 g/dL (11-29-24 @ 07:15)      plt trend  228 K/uL (12-01-24 @ 05:55)  218 K/uL (11-30-24 @ 12:50)  247 K/uL (11-30-24 @ 06:40)  485 K/uL (11-29-24 @ 07:15)        RADIOLOGY & ADDITIONAL STUDIES:

## 2024-12-01 NOTE — CHART NOTE - NSCHARTNOTEFT_GEN_A_CORE
I called and spoke to Patient's wife Mrs. Gann , updated her, went over test results and plan of care, all questions answered and concerns addressed

## 2024-12-01 NOTE — SWALLOW BEDSIDE ASSESSMENT ADULT - SLP PERTINENT HISTORY OF CURRENT PROBLEM
Pt known to this service at [facility] during admission 9/2023. MBS completed on 9/12/23 with recommendations for regular and mildly-thick liquids.

## 2024-12-02 LAB
24R-OH-CALCIDIOL SERPL-MCNC: 39.2 NG/ML — SIGNIFICANT CHANGE UP (ref 30–80)
ALBUMIN SERPL ELPH-MCNC: 2.1 G/DL — LOW (ref 3.3–5)
ALP SERPL-CCNC: 118 U/L — SIGNIFICANT CHANGE UP (ref 40–120)
ALT FLD-CCNC: 55 U/L — SIGNIFICANT CHANGE UP (ref 12–78)
ANION GAP SERPL CALC-SCNC: 4 MMOL/L — LOW (ref 5–17)
AST SERPL-CCNC: 56 U/L — HIGH (ref 15–37)
BILIRUB SERPL-MCNC: 0.4 MG/DL — SIGNIFICANT CHANGE UP (ref 0.2–1.2)
BUN SERPL-MCNC: 24 MG/DL — HIGH (ref 7–23)
CALCIUM SERPL-MCNC: 11.2 MG/DL — HIGH (ref 8.5–10.1)
CHLORIDE SERPL-SCNC: 104 MMOL/L — SIGNIFICANT CHANGE UP (ref 96–108)
CO2 SERPL-SCNC: 29 MMOL/L — SIGNIFICANT CHANGE UP (ref 22–31)
CREAT SERPL-MCNC: 1.1 MG/DL — SIGNIFICANT CHANGE UP (ref 0.5–1.3)
EGFR: 74 ML/MIN/1.73M2 — SIGNIFICANT CHANGE UP
GLUCOSE SERPL-MCNC: 289 MG/DL — HIGH (ref 70–99)
HCT VFR BLD CALC: 21.8 % — LOW (ref 39–50)
HGB BLD-MCNC: 7.1 G/DL — LOW (ref 13–17)
MAGNESIUM SERPL-MCNC: 1.5 MG/DL — LOW (ref 1.6–2.6)
MCHC RBC-ENTMCNC: 29.2 PG — SIGNIFICANT CHANGE UP (ref 27–34)
MCHC RBC-ENTMCNC: 32.6 G/DL — SIGNIFICANT CHANGE UP (ref 32–36)
MCV RBC AUTO: 89.7 FL — SIGNIFICANT CHANGE UP (ref 80–100)
NRBC # BLD: 0 /100 WBCS — SIGNIFICANT CHANGE UP (ref 0–0)
PHOSPHATE SERPL-MCNC: 1.9 MG/DL — LOW (ref 2.5–4.5)
PLATELET # BLD AUTO: 249 K/UL — SIGNIFICANT CHANGE UP (ref 150–400)
POTASSIUM SERPL-MCNC: 3.5 MMOL/L — SIGNIFICANT CHANGE UP (ref 3.5–5.3)
POTASSIUM SERPL-SCNC: 3.5 MMOL/L — SIGNIFICANT CHANGE UP (ref 3.5–5.3)
PROT SERPL-MCNC: 6.1 G/DL — SIGNIFICANT CHANGE UP (ref 6–8.3)
PROT SERPL-MCNC: 6.7 G/DL — SIGNIFICANT CHANGE UP (ref 6–8.3)
PTH-INTACT FLD-MCNC: 11 PG/ML — LOW (ref 15–65)
RBC # BLD: 2.43 M/UL — LOW (ref 4.2–5.8)
RBC # FLD: 20.2 % — HIGH (ref 10.3–14.5)
SODIUM SERPL-SCNC: 137 MMOL/L — SIGNIFICANT CHANGE UP (ref 135–145)
WBC # BLD: 4.14 K/UL — SIGNIFICANT CHANGE UP (ref 3.8–10.5)
WBC # FLD AUTO: 4.14 K/UL — SIGNIFICANT CHANGE UP (ref 3.8–10.5)

## 2024-12-02 PROCEDURE — 74181 MRI ABDOMEN W/O CONTRAST: CPT | Mod: 26

## 2024-12-02 PROCEDURE — 99233 SBSQ HOSP IP/OBS HIGH 50: CPT

## 2024-12-02 PROCEDURE — 99232 SBSQ HOSP IP/OBS MODERATE 35: CPT

## 2024-12-02 RX ORDER — SODIUM,POTASSIUM PHOSPHATES 278-250MG
1 POWDER IN PACKET (EA) ORAL ONCE
Refills: 0 | Status: COMPLETED | OUTPATIENT
Start: 2024-12-02 | End: 2024-12-02

## 2024-12-02 RX ADMIN — ESCITALOPRAM OXALATE 10 MILLIGRAM(S): 10 TABLET, FILM COATED ORAL at 06:13

## 2024-12-02 RX ADMIN — ROSUVASTATIN CALCIUM 10 MILLIGRAM(S): 5 TABLET, FILM COATED ORAL at 21:40

## 2024-12-02 RX ADMIN — Medication 1000 UNIT(S): at 11:10

## 2024-12-02 RX ADMIN — Medication 50 MILLIGRAM(S): at 11:10

## 2024-12-02 RX ADMIN — ERTAPENEM 120 MILLIGRAM(S): 1 INJECTION, POWDER, LYOPHILIZED, FOR SOLUTION INTRAMUSCULAR; INTRAVENOUS at 06:35

## 2024-12-02 RX ADMIN — Medication 88 MICROGRAM(S): at 06:12

## 2024-12-02 RX ADMIN — TACROLIMUS 2 MILLIGRAM(S): 5 CAPSULE ORAL at 11:10

## 2024-12-02 RX ADMIN — Medication 25 GRAM(S): at 18:29

## 2024-12-02 RX ADMIN — Medication 81 MILLIGRAM(S): at 11:10

## 2024-12-02 RX ADMIN — Medication 2: at 16:42

## 2024-12-02 RX ADMIN — ESCITALOPRAM OXALATE 10 MILLIGRAM(S): 10 TABLET, FILM COATED ORAL at 11:15

## 2024-12-02 RX ADMIN — AZATHIOPRINE 50 MILLIGRAM(S): 100 TABLET ORAL at 17:01

## 2024-12-02 RX ADMIN — CARVEDILOL 12.5 MILLIGRAM(S): 25 TABLET, FILM COATED ORAL at 17:01

## 2024-12-02 RX ADMIN — INSULIN GLARGINE 17 UNIT(S): 100 INJECTION, SOLUTION SUBCUTANEOUS at 21:41

## 2024-12-02 RX ADMIN — AMLODIPINE BESYLATE 10 MILLIGRAM(S): 10 TABLET ORAL at 06:10

## 2024-12-02 RX ADMIN — Medication 300 MILLIGRAM(S): at 11:10

## 2024-12-02 RX ADMIN — CARVEDILOL 12.5 MILLIGRAM(S): 25 TABLET, FILM COATED ORAL at 06:09

## 2024-12-02 RX ADMIN — HYDRALAZINE HYDROCHLORIDE 100 MILLIGRAM(S): 10 TABLET ORAL at 21:41

## 2024-12-02 RX ADMIN — HYDRALAZINE HYDROCHLORIDE 100 MILLIGRAM(S): 10 TABLET ORAL at 06:10

## 2024-12-02 RX ADMIN — LISINOPRIL 20 MILLIGRAM(S): 20 TABLET ORAL at 06:10

## 2024-12-02 RX ADMIN — Medication 2 TABLET(S): at 21:41

## 2024-12-02 RX ADMIN — TACROLIMUS 1 MILLIGRAM(S): 5 CAPSULE ORAL at 21:40

## 2024-12-02 RX ADMIN — Medication 6: at 12:12

## 2024-12-02 RX ADMIN — Medication 500 MILLIGRAM(S): at 17:01

## 2024-12-02 RX ADMIN — Medication 325 MILLIGRAM(S): at 17:01

## 2024-12-02 RX ADMIN — Medication 10: at 08:33

## 2024-12-02 RX ADMIN — Medication 1 TABLET(S): at 16:33

## 2024-12-02 RX ADMIN — ESCITALOPRAM OXALATE 10 MILLIGRAM(S): 10 TABLET, FILM COATED ORAL at 17:01

## 2024-12-02 RX ADMIN — POLYETHYLENE GLYCOL 3350 17 GRAM(S): 17 POWDER, FOR SOLUTION ORAL at 11:10

## 2024-12-02 RX ADMIN — AZATHIOPRINE 50 MILLIGRAM(S): 100 TABLET ORAL at 06:09

## 2024-12-02 RX ADMIN — Medication 500 MILLIGRAM(S): at 06:10

## 2024-12-02 RX ADMIN — HYDRALAZINE HYDROCHLORIDE 100 MILLIGRAM(S): 10 TABLET ORAL at 13:01

## 2024-12-02 RX ADMIN — Medication 325 MILLIGRAM(S): at 06:10

## 2024-12-02 NOTE — CARE COORDINATION ASSESSMENT. - NSCAREPROVIDERS_GEN_ALL_CORE_FT
CARE PROVIDERS:  Accepting Physician: Ale Ibrahim  Access Services: Elgin Marc  Administration: Connie Frye  Administration: En Wilson  Administration: Aaron Nunez  Administration: Casimiro Yeboah  Administration: Zehra Reilly  Admitting: Ale Ibrahim  Attending: Didier Pittman  Case Management: Luis Oseguera  Consultant: Kenneth Sal  Consultant: Weil, Patricia  Consultant: Kya Gonzalez  Consultant: Oralia Mendoza  Consultant: James Bruce  Consultant: Aaliyah Resendez  Consultant: Jonathan Valverde  Consultant: Vanessa Saul  Consultant: Patrice Briones  Consultant: Waldo Bryan  Consultant: Blanca Covington  Consultant: Khan, Fahrina  Covering Team: Susan Dewey  Covering Team: Sharri Abel ED Attending: Laura Bassett ED Nurse: Cricket Otero  Nurse: Tejinder Rocha Edu/Staff Development: Monica Delgadillo  Ordered: Doctor, Unknown  Ordered: ADM, User  Ordered: Jael Quarles  Outpatient Provider: Yang Billingsley  Outpatient Provider: James Bruce  Outpatient Provider: Yisel Webster  Outpatient Provider: Sami Santos  Outpatient Provider: Ruben Barger  Outpatient Provider: Patrice Guy  Override: Marshall Bernal  Override: Yamel Mckoy  Override: Gretel Cordoba  Override: Naya Cavazos  Override: Iradia Mary  Override: Ezekiel Ashley  Override: Kaice Chapa  PCA/Nursing Assistant: Toussaint, Josiane  Primary Team: Shanti Conn  Primary Team: Katiana Tubbs  Radiology Technician: Kenneth Martin  Registered Dietitian: Angelia Wright  Registered Dietitian: Merary Cage  : Mary Paul  Team: [facility]  Hospitalists, Team  UR// Supp. Assoc.: Simran Rossi  UR// Supp. Assoc.: Eunice Perry

## 2024-12-02 NOTE — PROGRESS NOTE ADULT - SUBJECTIVE AND OBJECTIVE BOX
Patient is a 69y old  Male who presents with a chief complaint of AMS (30 Nov 2024 17:08)       INTERVAL HPI/OVERNIGHT EVENTS: Patient seen and examined at bedside. Awake, alert. Denies chest pain, palpitations, sob. Answers some of the questions     MEDICATIONS  (STANDING):  amLODIPine   Tablet 10 milliGRAM(s) Oral daily  ascorbic acid 500 milliGRAM(s) Oral two times a day  aspirin enteric coated 81 milliGRAM(s) Oral daily  azaTHIOprine 50 milliGRAM(s) Oral two times a day  buPROPion XL (24-Hour) . 300 milliGRAM(s) Oral daily  carvedilol 6.25 milliGRAM(s) Oral every 12 hours  cholecalciferol 1000 Unit(s) Oral daily  dextrose 5%. 1000 milliLiter(s) (50 mL/Hr) IV Continuous <Continuous>  dextrose 5%. 1000 milliLiter(s) (100 mL/Hr) IV Continuous <Continuous>  dextrose 50% Injectable 25 Gram(s) IV Push once  dextrose 50% Injectable 12.5 Gram(s) IV Push once  dextrose 50% Injectable 25 Gram(s) IV Push once  ertapenem  IVPB      escitalopram 10 milliGRAM(s) Oral <User Schedule>  ferrous    sulfate 325 milliGRAM(s) Oral two times a day  glucagon  Injectable 1 milliGRAM(s) IntraMuscular once  hydrALAZINE 100 milliGRAM(s) Oral three times a day  insulin glargine Injectable (LANTUS) 17 Unit(s) SubCutaneous at bedtime  insulin lispro (ADMELOG) corrective regimen sliding scale   SubCutaneous three times a day before meals  levothyroxine 88 MICROGram(s) Oral <User Schedule>  lisinopril 20 milliGRAM(s) Oral daily  mineral oil enema 133 milliLiter(s) Rectal once  polyethylene glycol 3350 17 Gram(s) Oral daily  potassium phosphate IVPB 30 milliMole(s) IV Intermittent once  pyridoxine 50 milliGRAM(s) Oral daily  rosuvastatin 10 milliGRAM(s) Oral at bedtime  senna 2 Tablet(s) Oral at bedtime  sodium chloride 0.9%. 1000 milliLiter(s) (150 mL/Hr) IV Continuous <Continuous>  tacrolimus 2 milliGRAM(s) Oral daily  tacrolimus 1 milliGRAM(s) Oral at bedtime    MEDICATIONS  (PRN):  acetaminophen   IVPB .. 1000 milliGRAM(s) IV Intermittent every 8 hours PRN Temp greater or equal to 38C (100.4F)  aluminum hydroxide/magnesium hydroxide/simethicone Suspension 30 milliLiter(s) Oral every 4 hours PRN Dyspepsia  dextrose Oral Gel 15 Gram(s) Oral once PRN Blood Glucose LESS THAN 70 milliGRAM(s)/deciliter  hydrALAZINE Injectable 10 milliGRAM(s) IV Push every 8 hours PRN SBP >180 and HR 60-70bpm  melatonin 3 milliGRAM(s) Oral at bedtime PRN Insomnia  metoprolol tartrate Injectable 5 milliGRAM(s) IV Push every 6 hours PRN SBP >170  ondansetron Injectable 4 milliGRAM(s) IV Push every 8 hours PRN Nausea and/or Vomiting      Allergies    No Known Allergies    Intolerances        REVIEW OF SYSTEMS:  Per HPI. All other ROS noted Negative       Vital Signs Last 24 Hrs  T(C): 37 (02 Dec 2024 11:54), Max: 37.6 (01 Dec 2024 20:40)  T(F): 98.6 (02 Dec 2024 11:54), Max: 99.6 (01 Dec 2024 20:40)  HR: 74 (02 Dec 2024 11:54) (74 - 80)  BP: 154/68 (02 Dec 2024 11:54) (154/68 - 171/80)  BP(mean): --  RR: 18 (02 Dec 2024 11:54) (18 - 20)  SpO2: 93% (02 Dec 2024 11:54) (93% - 94%)    Parameters below as of 02 Dec 2024 11:54  Patient On (Oxygen Delivery Method): room air          PHYSICAL EXAM:  General: NAD, alert   Head: normocephalic, atraumatic  Eyes: anicteric  ENT: moist mucous membranes, no pharyngeal exudates  Heart: rrr, S1, S2, poss I/VI murmur  Chest: CTA b/l, no rales, rhonchi, or wheezes  Abd: BS+, soft, NT, ND, old midline abd scar   Back: no CVA tenderness  Extr: no edema or cyanosis  Skin: cherry angiomas diffusely    LABS:                        7.1    4.14  )-----------( 249      ( 02 Dec 2024 11:50 )             21.8     02 Dec 2024 11:50    137    |  104    |  24     ----------------------------<  289    3.5     |  29     |  1.10     Ca    11.2       02 Dec 2024 11:50  Phos  1.9       02 Dec 2024 11:50  Mg     1.5       02 Dec 2024 11:50    TPro  6.7    /  Alb  2.1    /  TBili  0.4    /  DBili  x      /  AST  56     /  ALT  55     /  AlkPhos  118    02 Dec 2024 11:50      Urinalysis Basic - ( 02 Dec 2024 11:50 )    Color: x / Appearance: x / SG: x / pH: x  Gluc: 289 mg/dL / Ketone: x  / Bili: x / Urobili: x   Blood: x / Protein: x / Nitrite: x   Leuk Esterase: x / RBC: x / WBC x   Sq Epi: x / Non Sq Epi: x / Bacteria: x      CAPILLARY BLOOD GLUCOSE      POCT Blood Glucose.: 294 mg/dL (02 Dec 2024 12:00)  POCT Blood Glucose.: 380 mg/dL (02 Dec 2024 08:29)  POCT Blood Glucose.: 371 mg/dL (01 Dec 2024 23:13)  POCT Blood Glucose.: 349 mg/dL (01 Dec 2024 20:57)  POCT Blood Glucose.: 322 mg/dL (01 Dec 2024 16:53)    UCx       RADIOLOGY & ADDITIONAL TESTS:    BLOOD CULTURE  11-29 @ 11:00   50,000 - 99,000 CFU/mL Escherichia coli  --  --  11-29 @ 07:15   Growth in aerobic and anaerobic bottles: Escherichia coli  --  --  11-29 @ 07:10   Growth in aerobic and anaerobic bottles: Escherichia coli  Direct identification is available within approximately 3-5  hours either by Blood Panel Multiplexed PCR or Direct  MALDI-TOF. Details: [email or web address]  --  Blood Culture PCR    RADIOLOGY & ADDITIONAL TESTS:    Imaging Personally Reviewed:  [ ] YES     Consultant(s) Notes Reviewed:      Care Discussed with Consultants/Other Providers:

## 2024-12-02 NOTE — PROGRESS NOTE ADULT - ASSESSMENT
CKD 3, h/o Kidney transplant ~2012, h/o Left Nephrectomy  Diabetes  Hypertension  Sepsis, UTI, Sacral osteomyelitis, Gram negative bacteremia  Hypokalemia  Hypercalcemia  Hypomagnesemia  Hypophosphatemia    11/30/24: IV hydration. Potassium and magnesium supplementation. Work up for hypercalcemia as ordered. To continue preadmission immuno suppressants.   Monitor blood sugar levels. Insulin coverage as needed. Dietary restriction. Trend BP and titrate BP meds as needed.   Avoid nephrotoxic meds as possible. IV abx, ID follow up. Will follow electrolytes and renal function trend.   12/01/24: Lower IVF. Trend BP and titrate BP meds as needed. To continue current meds. Phosphorous supplementation. IV abx, ID follow up. Discussed with patients wife at the bedside.   12/02/24: Stable renal indices. To continue current meds. Will follow electrolytes and renal function trend.  CKD 3, h/o Kidney transplant ~2012, h/o Left Nephrectomy  Diabetes  Hypertension  Sepsis, UTI, Sacral osteomyelitis, Gram negative bacteremia  Hypokalemia  Hypercalcemia  Hypomagnesemia  Hypophosphatemia  Anemia    11/30/24: IV hydration. Potassium and magnesium supplementation. Work up for hypercalcemia as ordered. To continue preadmission immuno suppressants.   Monitor blood sugar levels. Insulin coverage as needed. Dietary restriction. Trend BP and titrate BP meds as needed.   Avoid nephrotoxic meds as possible. IV abx, ID follow up. Will follow electrolytes and renal function trend.   12/01/24: Lower IVF. Trend BP and titrate BP meds as needed. To continue current meds. Phosphorous supplementation. IV abx, ID follow up. Discussed with patients wife at the bedside.   12/02/24: Stable renal indices. To continue current meds. Magnesium and phos supps. May need PRBC tx if no improvement. Will follow electrolytes and renal function trend.

## 2024-12-02 NOTE — DIETITIAN INITIAL EVALUATION ADULT - NS FNS DIET ORDER
Diet, Soft and Bite Sized:   Consistent Carbohydrate {No Snacks}  DASH/TLC {Sodium & Cholesterol Restricted} (11-30-24 @ 08:25) [Active]

## 2024-12-02 NOTE — PROGRESS NOTE ADULT - ASSESSMENT
IMPRESSION  Anemia multifactorial in etiology related to prior renal transplant with meds and now with osteomyelitis and sepsis with Gram neg maury sepsis  Blood and urine culture with E. Coli  Hgb 6.8==>7.3 ==>7.1 g/dL after receiving 1 unit PRBC  expect to be transfusion dependent with active infection    Ferritin 85, iron 22, TIBC 164, sat 14%  PSA 12.3    ,     RECOMMENDATIONS    1.  follow CBC and transfuse as indicated    2.  with prior renal transplant would use irradiated PRBC and try to keep transfusions to a minimum. hold transfusion today    4.  continue renal evaluation. note calcium increased 11.7==>11.2  Renal to eval    5.  ID evaluation and management    6.  orthopedic followup    7.  further heme recommendations pending above    discussed with Dr. Tubbs IMPRESSION  Anemia multifactorial in etiology related to prior renal transplant with meds and now with osteomyelitis and sepsis with Gram neg maury sepsis  Blood and urine culture with E. Coli  Hgb 6.8==>7.3 ==>7.1 g/dL after receiving 1 unit PRBC  expect to be transfusion dependent with active infection    Ferritin 885, iron 22, TIBC 164, sat 14%  PSA 12.3    ,     RECOMMENDATIONS    1.  follow CBC and transfuse as indicated    2.  with prior renal transplant would use irradiated PRBC and try to keep transfusions to a minimum. hold transfusion today    4.  continue renal evaluation. note calcium increased 11.7==>11.2  Renal to eval    5.  ID evaluation and management    6.  orthopedic followup    7.  further heme recommendations pending above    discussed with Dr. Tubbs IMPRESSION  Anemia multifactorial in etiology related to prior renal transplant with meds and now with osteomyelitis and sepsis with Gram neg maury sepsis  Blood and urine culture with E. Coli  Hgb 6.8==>7.3 ==>7.1 g/dL after receiving 1 unit PRBC  expect to be transfusion dependent with active infection    Ferritin 885, iron 22, TIBC 164, sat 14%  PSA 12.3    ,     RECOMMENDATIONS    1.  follow CBC and transfuse as indicated    2.  with prior renal transplant would use irradiated PRBC and try to keep transfusions to a minimum. hold transfusion today    4.  continue renal evaluation. note calcium increased 11.7==>11.2  Renal to eval    5.  ID evaluation and management    6.  orthopedic followup    Thank you for consulting us.   No additional recommendations at current time.   Will sign off on case for now.   Please call, or re-consult if needed.

## 2024-12-02 NOTE — CASE MANAGEMENT PROGRESS NOTE - NSCMPROGRESSNOTE_GEN_ALL_CORE
Discussed pt on rounds, pt remains acute, awaiting further testing, pending MRI abdomen, +UTI, IV Ertepenem, +ECOLI urine. CM will continue to collaborate with interdisciplinary team and remain available to assist.

## 2024-12-02 NOTE — CARE COORDINATION ASSESSMENT. - LIVING ARRANGEMENTS, PROFILE
Patient is currently from [facility]. Wife is considering rolling him over to nursing home care. Discussed this further. She has not reached out to [facility] . Provided referrals for Elder care planning./apartment

## 2024-12-02 NOTE — PROGRESS NOTE ADULT - SUBJECTIVE AND OBJECTIVE BOX
[INTERVAL HX: ]  Patient seen and examined;  Chart reviewed and events noted;   no CP, no SOB        [MEDICATIONS]  MEDICATIONS  (STANDING):  amLODIPine   Tablet 10 milliGRAM(s) Oral daily  ascorbic acid 500 milliGRAM(s) Oral two times a day  aspirin enteric coated 81 milliGRAM(s) Oral daily  azaTHIOprine 50 milliGRAM(s) Oral two times a day  buPROPion XL (24-Hour) . 300 milliGRAM(s) Oral daily  carvedilol 12.5 milliGRAM(s) Oral every 12 hours  cholecalciferol 1000 Unit(s) Oral daily  dextrose 5%. 1000 milliLiter(s) (100 mL/Hr) IV Continuous <Continuous>  dextrose 5%. 1000 milliLiter(s) (50 mL/Hr) IV Continuous <Continuous>  dextrose 50% Injectable 25 Gram(s) IV Push once  dextrose 50% Injectable 12.5 Gram(s) IV Push once  dextrose 50% Injectable 25 Gram(s) IV Push once  ertapenem  IVPB      ertapenem  IVPB 1000 milliGRAM(s) IV Intermittent every 24 hours  escitalopram 10 milliGRAM(s) Oral <User Schedule>  ferrous    sulfate 325 milliGRAM(s) Oral two times a day  glucagon  Injectable 1 milliGRAM(s) IntraMuscular once  hydrALAZINE 100 milliGRAM(s) Oral three times a day  insulin glargine Injectable (LANTUS) 17 Unit(s) SubCutaneous at bedtime  insulin lispro (ADMELOG) corrective regimen sliding scale   SubCutaneous three times a day before meals  levothyroxine 88 MICROGram(s) Oral <User Schedule>  lisinopril 20 milliGRAM(s) Oral daily  mineral oil enema 133 milliLiter(s) Rectal once  multivitamin/minerals/iron Oral Solution (CENTRUM) 15 milliLiter(s) Oral daily  polyethylene glycol 3350 17 Gram(s) Oral daily  pyridoxine 50 milliGRAM(s) Oral daily  rosuvastatin 10 milliGRAM(s) Oral at bedtime  senna 2 Tablet(s) Oral at bedtime  sodium chloride 0.45%. 1000 milliLiter(s) (50 mL/Hr) IV Continuous <Continuous>  tacrolimus 2 milliGRAM(s) Oral daily  tacrolimus 1 milliGRAM(s) Oral at bedtime    MEDICATIONS  (PRN):  acetaminophen   IVPB .. 1000 milliGRAM(s) IV Intermittent every 8 hours PRN Temp greater or equal to 38C (100.4F)  aluminum hydroxide/magnesium hydroxide/simethicone Suspension 30 milliLiter(s) Oral every 4 hours PRN Dyspepsia  dextrose Oral Gel 15 Gram(s) Oral once PRN Blood Glucose LESS THAN 70 milliGRAM(s)/deciliter  hydrALAZINE Injectable 10 milliGRAM(s) IV Push every 8 hours PRN SBP >180 and HR 60-70bpm  melatonin 3 milliGRAM(s) Oral at bedtime PRN Insomnia  metoprolol tartrate Injectable 5 milliGRAM(s) IV Push every 6 hours PRN SBP >170  ondansetron Injectable 4 milliGRAM(s) IV Push every 8 hours PRN Nausea and/or Vomiting      [VITALS]  Vital Signs Last 24 Hrs  T(C): 37 (02 Dec 2024 11:54), Max: 37.6 (01 Dec 2024 20:40)  T(F): 98.6 (02 Dec 2024 11:54), Max: 99.6 (01 Dec 2024 20:40)  HR: 74 (02 Dec 2024 11:54) (74 - 80)  BP: 154/68 (02 Dec 2024 11:54) (154/68 - 171/80)  BP(mean): --  RR: 18 (02 Dec 2024 11:54) (18 - 20)  SpO2: 93% (02 Dec 2024 11:54) (93% - 94%)    Parameters below as of 02 Dec 2024 11:54  Patient On (Oxygen Delivery Method): room air      [WT/HT]  Daily     Daily Weight in k.5 (02 Dec 2024 04:53)  [VENT]      [PHYSICAL EXAM]  GEN: NAD  HEENT: normocephalic and atraumatic. EOMI. PERRL.    NECK: Supple.  No lymphadenopathy   LUNGS: Clear to auscultation.  HEART: Regular rate and rhythm,  no MRG  ABDOMEN: Soft, nontender, and nondistended.  Positive bowel sounds.    : No CVA tenderness  EXTREMITIES: Without edema.  NEUROLOGIC: grossly intact.  PSYCHIATRIC: Appropriate affect .  SKIN: No rash     [LABS:]                        7.1    4.14  )-----------( 249      ( 02 Dec 2024 11:50 )             21.8     12-    137  |  104  |  24[H]  ----------------------------<  289[H]  3.5   |  29  |  1.10    Ca    11.2[H]      02 Dec 2024 11:50  Phos  1.9       Mg     1.5         TPro  6.7  /  Alb  2.1[L]  /  TBili  0.4  /  DBili  x   /  AST  56[H]  /  ALT  55  /  AlkPhos  118            Ferritin: 885 ng/mL *H* [30 - 400] (24 @ 05:55)    Iron - Total Binding Capacity.: 164 ug/dL *L* [220 - 430] (24 @ 05:55)    Sedimentation Rate, Erythrocyte: 117 mm/hr *H* [0 - 20] (24 @ 16:05)      Urinalysis Basic - ( 02 Dec 2024 11:50 )    Color: x / Appearance: x / SG: x / pH: x  Gluc: 289 mg/dL / Ketone: x  / Bili: x / Urobili: x   Blood: x / Protein: x / Nitrite: x   Leuk Esterase: x / RBC: x / WBC x   Sq Epi: x / Non Sq Epi: x / Bacteria: x        Culture - Blood (collected 2024 07:05)  Source: .Blood BLOOD  Preliminary Report (02 Dec 2024 13:01):    No growth at 48 Hours    Culture - Blood (collected 2024 07:00)  Source: .Blood BLOOD  Preliminary Report (02 Dec 2024 13:):    No growth at 48 Hours            Culture - Blood (collected 2024 07:05)  Source: .Blood BLOOD  Preliminary Report (02 Dec 2024 13:):    No growth at 48 Hours    Culture - Blood (collected 2024 07:00)  Source: .Blood BLOOD  Preliminary Report (02 Dec 2024 13:01):    No growth at 48 Hours        [RADIOLOGY STUDIES:]

## 2024-12-02 NOTE — CARE COORDINATION ASSESSMENT. - PRO ARRIVE FROM
Mr Carlos Rubio,  I enjoyed visiting with you again today.  I am glad to hear you are doing well.  Per our conversation make no changes but watch for falls and blood pressures.  I will plan on seeing you 6-12 months.  Chase Sewell  
inpatient rehabilitation facility

## 2024-12-02 NOTE — PROGRESS NOTE ADULT - SUBJECTIVE AND OBJECTIVE BOX
[facility]                                                       Dr. Bruce, Dr. Prabhakar, Dr. Cabrales, Dr. Zaragoza, Dr. Bingham, Dr. Loya                                      [facility], [facility], [address]                                                 [address] [address]                                      Ph: [phone]  Fax: [phone]                                         Ph: [phone]  Fax: [phone]      Patient is a 69y old  Male who presents with a chief complaint of AMS (01 Dec 2024 11:19)  Patient seen in follow up for CKD, h/o Kidney transplant.        PAST MEDICAL HISTORY:  Diabetes Mellitus Type II    ESRD on Dialysis    GERD (gastroesophageal reflux disease)    Depression    Hypothyroidism    Hyperlipidemia    Kidney transplanted    Hypertension    ANGELIKA (obstructive sleep apnea)    Peripheral neuropathy    Shoulder fracture      MEDICATIONS  (STANDING):  amLODIPine   Tablet 10 milliGRAM(s) Oral daily  ascorbic acid 500 milliGRAM(s) Oral two times a day  aspirin enteric coated 81 milliGRAM(s) Oral daily  azaTHIOprine 50 milliGRAM(s) Oral two times a day  buPROPion XL (24-Hour) . 300 milliGRAM(s) Oral daily  carvedilol 12.5 milliGRAM(s) Oral every 12 hours  cholecalciferol 1000 Unit(s) Oral daily  dextrose 5%. 1000 milliLiter(s) (50 mL/Hr) IV Continuous <Continuous>  dextrose 5%. 1000 milliLiter(s) (100 mL/Hr) IV Continuous <Continuous>  dextrose 50% Injectable 25 Gram(s) IV Push once  dextrose 50% Injectable 12.5 Gram(s) IV Push once  dextrose 50% Injectable 25 Gram(s) IV Push once  ertapenem  IVPB      ertapenem  IVPB 1000 milliGRAM(s) IV Intermittent every 24 hours  escitalopram 10 milliGRAM(s) Oral <User Schedule>  ferrous    sulfate 325 milliGRAM(s) Oral two times a day  glucagon  Injectable 1 milliGRAM(s) IntraMuscular once  hydrALAZINE 100 milliGRAM(s) Oral three times a day  insulin glargine Injectable (LANTUS) 17 Unit(s) SubCutaneous at bedtime  insulin lispro (ADMELOG) corrective regimen sliding scale   SubCutaneous three times a day before meals  levothyroxine 88 MICROGram(s) Oral <User Schedule>  lisinopril 20 milliGRAM(s) Oral daily  mineral oil enema 133 milliLiter(s) Rectal once  polyethylene glycol 3350 17 Gram(s) Oral daily  pyridoxine 50 milliGRAM(s) Oral daily  rosuvastatin 10 milliGRAM(s) Oral at bedtime  senna 2 Tablet(s) Oral at bedtime  sodium chloride 0.45%. 1000 milliLiter(s) (50 mL/Hr) IV Continuous <Continuous>  tacrolimus 2 milliGRAM(s) Oral daily  tacrolimus 1 milliGRAM(s) Oral at bedtime    MEDICATIONS  (PRN):  acetaminophen   IVPB .. 1000 milliGRAM(s) IV Intermittent every 8 hours PRN Temp greater or equal to 38C (100.4F)  aluminum hydroxide/magnesium hydroxide/simethicone Suspension 30 milliLiter(s) Oral every 4 hours PRN Dyspepsia  dextrose Oral Gel 15 Gram(s) Oral once PRN Blood Glucose LESS THAN 70 milliGRAM(s)/deciliter  hydrALAZINE Injectable 10 milliGRAM(s) IV Push every 8 hours PRN SBP >180 and HR 60-70bpm  melatonin 3 milliGRAM(s) Oral at bedtime PRN Insomnia  metoprolol tartrate Injectable 5 milliGRAM(s) IV Push every 6 hours PRN SBP >170  ondansetron Injectable 4 milliGRAM(s) IV Push every 8 hours PRN Nausea and/or Vomiting    T(C): 37 (12-02-24 @ 11:54), Max: 37.6 (12-01-24 @ 20:40)  HR: 74 (12-02-24 @ 11:54) (74 - 97)  BP: 154/68 (12-02-24 @ 11:54) (154/68 - 187/64)  RR: 18 (12-02-24 @ 11:54)  SpO2: 93% (12-02-24 @ 11:54)  Wt(kg): --  I&O's Detail    01 Dec 2024 07:01  -  02 Dec 2024 07:00  --------------------------------------------------------  IN:    sodium chloride 0.45%: 300 mL    sodium chloride 0.9%: 900 mL  Total IN: 1200 mL    OUT:    Voided (mL): 2700 mL  Total OUT: 2700 mL    Total NET: -1500 mL              PHYSICAL EXAM:  General: No distress  Respiratory: b/l air entry  Cardiovascular: S1 S2  Gastrointestinal: soft  Extremities:  no edema                   LABORATORY:                        7.1    4.14  )-----------( 249      ( 02 Dec 2024 11:50 )             21.8     12-02    137  |  104  |  24[H]  ----------------------------<  289[H]  3.5   |  29  |  1.10    Ca    11.2[H]      02 Dec 2024 11:50  Phos  1.9     12-02  Mg     1.5     12-02    TPro  6.7  /  Alb  2.1[L]  /  TBili  0.4  /  DBili  x   /  AST  56[H]  /  ALT  55  /  AlkPhos  118  12-02    Sodium: 137 mmol/L (12-02 @ 11:50)  Sodium: 144 mmol/L (12-01 @ 05:55)    Potassium: 3.5 mmol/L (12-02 @ 11:50)  Potassium: 3.7 mmol/L (12-01 @ 05:55)    Hemoglobin: 7.1 g/dL (12-02 @ 11:50)  Hemoglobin: 7.3 g/dL (12-01 @ 05:55)  Hemoglobin: 6.9 g/dL (11-30 @ 12:50)  Hemoglobin: 6.8 g/dL (11-30 @ 10:20)    Creatinine, Serum 1.10 (12-02 @ 11:50)  Creatinine, Serum 1.00 (12-01 @ 05:55)  Creatinine, Serum 1.10 (11-30 @ 06:40)        LIVER FUNCTIONS - ( 02 Dec 2024 11:50 )  Alb: 2.1 g/dL / Pro: 6.7 g/dL / ALK PHOS: 118 U/L / ALT: 55 U/L / AST: 56 U/L / GGT: x           Urinalysis Basic - ( 02 Dec 2024 11:50 )    Color: x / Appearance: x / SG: x / pH: x  Gluc: 289 mg/dL / Ketone: x  / Bili: x / Urobili: x   Blood: x / Protein: x / Nitrite: x   Leuk Esterase: x / RBC: x / WBC x   Sq Epi: x / Non Sq Epi: x / Bacteria: x

## 2024-12-02 NOTE — CARE COORDINATION ASSESSMENT. - NSDCPLANSERVICES_GEN_ALL_CORE
Met with patient by bedside. Patient is confused. Called [facility] . They report patient is there for rehab. He has been there for two weeks after discharge from [facility]. Spoke to wife who would like him to return to [facility]. She reports his care may be difficult for her as she has MS. Educated her on role of  and discharge planner.

## 2024-12-02 NOTE — DIETITIAN INITIAL EVALUATION ADULT - OTHER INFO
Patient is a 68yo M, PMH DM, HTN, HLD, h/o kidney transplant, hypothyroidism, presents to ED from [facility] for AMS likely metabolic encephalopathy   AMS 2/2 Sepsis 2/2 multiple infections (UTI, sacral infection, possible osteomyelitis), E coli bacteremia.    Pt awake/confused at time of visit. S/p SLP evaluation (12/1) with recommendation for soft and bite sized diet with thin liquids. Fluctuating appetite/po intake per EMR; 0-100% on 11/30. GI wdl, Fecal incontinence noted. Bowel regimen rx. Pta from [facility] [place] on NCS, Low Na diet. Per transfer papers height 72", weight 199lbs. Current weight 186.2lbs(12/2). Hyperglycemia noted, -380 past 24 [id number]. On 17units lantus q hs. Recommend further adjustments per MD for BS goal 100-180. Low Phos 1.9, Mg 1.5, K 3.5- recommend supplementation. Recommend assistance/encouragement during meals/snacks with the emphasis on HBV protein sources. Recommend Ensure max 8oz BID to meet est energy/pro needs.

## 2024-12-02 NOTE — PROGRESS NOTE ADULT - ASSESSMENT
Patient is a 70yo M, PMH DM, HTN, HLD, h/o kidney transplant, hypothyroidism, presents to ED from [facility] for AMS. Patient is lethargic and unable to provide history at this time.    ESBL Bacteremia 2/2 Acute Cystitis  Sacral Wound +/- OM  Fevers  AMS 2/2 above  Febrile in the .5  UA positive for UTI  Flu/COVID/RSV negative  11/29 BCx ESBL E.coli , repeat negative  11/29 UCx   50,000 - 99,000 CFU/mL Escherichia coli    CT CAP w/ Small left pleural effusion with small loculated components  Right pelvic transplant kidney with mild fullness of the pelvic alyceal system.  Stercoral proctitis.  Patchy sclerosis and osteolysis throughout the bilateral sacrum with pathologic fractures. There is soft tissue with stranding extending throughout the presacral and posterior extraperitoneal pelvic soft tissues.  There are a few bubbles of air/gas at the right sacral pathologic fracture, findings suggestive of infection/osteomyelitis.    Reviewed w/ admitting attending, no sacral wound present    Recommendations:   C/w ertapenem 1gm q24h x10 day course until 12/10  --will need midline (ordered)  Trend temps/WBC  Surgery following  Additional care per primary team    D/w Dr. Pittman  Infectious Diseases will follow. Please call with any questions.  Vanessa Saul M.D.  [facility] Division of Infectious Diseases [phone]  For after 5 P.M. and weekends, please call [phone]  Available on [employer] TEAMS

## 2024-12-02 NOTE — PROGRESS NOTE ADULT - SUBJECTIVE AND OBJECTIVE BOX
[facility] --  Tom Rahman Pannella, Patel, Savella, Goodger, Cohen  Office # [phone]    Follow Up:  Pericardial effusion    Subjective/Observations: in progress    REVIEW OF SYSTEMS: All other review of systems is negative unless indicated above  PAST MEDICAL & SURGICAL HISTORY:  Diabetes Mellitus Type II  Insulin pump ()      GERD (gastroesophageal reflux disease)      Depression      Hypothyroidism      Hyperlipidemia      Kidney transplanted        Hypertension      ANGELIKA (obstructive sleep apnea)      Peripheral neuropathy      Shoulder fracture  Left.      History of colonoscopy      S/P kidney transplant        S/P cholecystectomy      Retroperitoneal fibrosis  s/p surgery      AV fistula  Right arm      S/P right cataract extraction      S/P left cataract extraction      H/O unilateral nephrectomy  Left        MEDICATIONS  (STANDING):  amLODIPine   Tablet 10 milliGRAM(s) Oral daily  ascorbic acid 500 milliGRAM(s) Oral two times a day  aspirin enteric coated 81 milliGRAM(s) Oral daily  azaTHIOprine 50 milliGRAM(s) Oral two times a day  buPROPion XL (24-Hour) . 300 milliGRAM(s) Oral daily  carvedilol 12.5 milliGRAM(s) Oral every 12 hours  cholecalciferol 1000 Unit(s) Oral daily  dextrose 5%. 1000 milliLiter(s) (100 mL/Hr) IV Continuous <Continuous>  dextrose 5%. 1000 milliLiter(s) (50 mL/Hr) IV Continuous <Continuous>  dextrose 50% Injectable 25 Gram(s) IV Push once  dextrose 50% Injectable 12.5 Gram(s) IV Push once  dextrose 50% Injectable 25 Gram(s) IV Push once  ertapenem  IVPB      ertapenem  IVPB 1000 milliGRAM(s) IV Intermittent every 24 hours  escitalopram 10 milliGRAM(s) Oral <User Schedule>  ferrous    sulfate 325 milliGRAM(s) Oral two times a day  glucagon  Injectable 1 milliGRAM(s) IntraMuscular once  hydrALAZINE 100 milliGRAM(s) Oral three times a day  insulin glargine Injectable (LANTUS) 17 Unit(s) SubCutaneous at bedtime  insulin lispro (ADMELOG) corrective regimen sliding scale   SubCutaneous three times a day before meals  levothyroxine 88 MICROGram(s) Oral <User Schedule>  lisinopril 20 milliGRAM(s) Oral daily  mineral oil enema 133 milliLiter(s) Rectal once  polyethylene glycol 3350 17 Gram(s) Oral daily  pyridoxine 50 milliGRAM(s) Oral daily  rosuvastatin 10 milliGRAM(s) Oral at bedtime  senna 2 Tablet(s) Oral at bedtime  sodium chloride 0.45%. 1000 milliLiter(s) (50 mL/Hr) IV Continuous <Continuous>  tacrolimus 2 milliGRAM(s) Oral daily  tacrolimus 1 milliGRAM(s) Oral at bedtime    MEDICATIONS  (PRN):  acetaminophen   IVPB .. 1000 milliGRAM(s) IV Intermittent every 8 hours PRN Temp greater or equal to 38C (100.4F)  aluminum hydroxide/magnesium hydroxide/simethicone Suspension 30 milliLiter(s) Oral every 4 hours PRN Dyspepsia  dextrose Oral Gel 15 Gram(s) Oral once PRN Blood Glucose LESS THAN 70 milliGRAM(s)/deciliter  hydrALAZINE Injectable 10 milliGRAM(s) IV Push every 8 hours PRN SBP >180 and HR 60-70bpm  melatonin 3 milliGRAM(s) Oral at bedtime PRN Insomnia  metoprolol tartrate Injectable 5 milliGRAM(s) IV Push every 6 hours PRN SBP >170  ondansetron Injectable 4 milliGRAM(s) IV Push every 8 hours PRN Nausea and/or Vomiting    Allergies    No Known Allergies    Intolerances      Vital Signs Last 24 Hrs  T(C): 37 (02 Dec 2024 04:53), Max: 37.6 (01 Dec 2024 20:40)  T(F): 98.6 (02 Dec 2024 04:53), Max: 99.6 (01 Dec 2024 20:40)  HR: 79 (02 Dec 2024 04:53) (79 - 82)  BP: 171/80 (02 Dec 2024 04:53) (159/65 - 181/74)  BP(mean): --  RR: 20 (02 Dec 2024 04:53) (18 - 20)  SpO2: 93% (02 Dec 2024 04:53) (93% - 94%)    Parameters below as of 02 Dec 2024 04:53  Patient On (Oxygen Delivery Method): room air      I&O's Summary    01 Dec 2024 07:01  -  02 Dec 2024 07:00  --------------------------------------------------------  IN: 1200 mL / OUT: 2700 mL / NET: -1500 mL        TELE:   PHYSICAL EXAM:  PHYSICAL EXAM:  Constitutional: NAD, awake and alert  HEENT: Moist Mucous Membranes, Anicteric  Pulmonary: Non-labored, breath sounds are clear bilaterally, No wheezing, rales or rhonchi  Cardiovascular: Regular, S1 and S2, No murmurs, No rubs, gallops or clicks  Gastrointestinal:  soft, nontender, nondistended   Lymph: No peripheral edema. No lymphadenopathy.   Skin: No visible rashes or ulcers.  Psych:  confused     LABS: All Labs Reviewed:                        7.3    4.72  )-----------( 228      ( 01 Dec 2024 05:55 )             23.0                         6.9    5.06  )-----------( 218      ( 2024 12:50 )             21.8                         6.8    x     )-----------( x        ( 2024 10:20 )             21.7     01 Dec 2024 05:55    144    |  112    |  23     ----------------------------<  204    3.7     |  29     |  1.00   2024 06:40    144    |  110    |  25     ----------------------------<  171    3.2     |  28     |  1.10     Ca    11.7       01 Dec 2024 05:55  Ca    11.1       2024 06:40  Phos  1.6       01 Dec 2024 05:55  Phos  2.6       2024 06:40  Phos  3.2       2024 17:10  Mg     1.8       01 Dec 2024 05:55  Mg     1.3       2024 06:40    TPro  6.8    /  Alb  2.1    /  TBili  0.6    /  DBili  x      /  AST  59     /  ALT  45     /  AlkPhos  106    01 Dec 2024 05:55  TPro  6.9    /  Alb  x      /  TBili  x      /  DBili  x      /  AST  x      /  ALT  x      /  AlkPhos  x      2024 17:10          12 Lead ECG:   Ventricular Rate 81 BPM    Atrial Rate 81 BPM    P-R Interval 148 ms    QRS Duration 104 ms    Q-T Interval 406 ms    QTC Calculation(Bazett) 471 ms    P Axis 44 degrees    R Axis 12 degrees    T Axis 53 degrees    Diagnosis Line Normal sinus rhythm  Normal ECG  When compared with ECG of 2024 07:11,  Nonspecific T wave abnormality, improved in Lateral leads  Confirmed by Kya Gonzalez ([id number]) on 2024 10:43:18 AM (24 @ 09:28)      EXAM:  ECHO TRANSTHORACIC COMP W DOPP      PROCEDURE DATE:  Dec 20 2018   .      INTERPRETATION:  REPORT:    TRANSTHORACIC ECHOCARDIOGRAM REPORT         Patient Name:   JAN CALVIN Patient Location: Inpatient  Medical Rec #:  [id number] Accession #:      [id number]  Account #:      [id number]          Height:           72.0 in 182.9 cm  YOB: 1955        Weight:           218.0 lb 98.88 kg  Patient Age:    63 years          BSA:              2.21 m²  Patient Gender: M              BP:               126/60 mmHg       Date of Exam:        2018 7:38:45 PM  Sonographer:         Fish Dee  Referring Physician: Patricio Huang MD    Procedure:     2D Echo/Doppler/Color Doppler Complete.  Indications:   Chest pain, unspecified - R07.9  Diagnosis:     Chest pain, unspecified - R07.9  Study Details: Technically difficult study. Study quality was adversely   affected                 due to patient obesity.         2D AND M-MODE MEASUREMENTS (normal ranges within parentheses):  Left                Normal    Aorta/Left           Normal  Ventricle:                    Atrium:  IVSd (2D):    1.02  (0.7-1.1) Left Atrium  3.44 cm (1.9-4.0)                cm              (2D):  LVPWd (2D):   0.97  (0.7-1.1) LA Volume    30.7                cm              Index        ml/m²  LVIDd (2D):   5.39  (3.4-5.7) Right Ventricle:                cm              TAPSE:           2.50 cm  LVIDs (2D):   3.31                cm  LV FS (2D):   38.5  (>25%)                %  LVEF (2D):   68 %  (>55%)  Relative Wall 0.36  (<0.42)  Thickness    LV SYSTOLIC FUNCTION BY 2D PLANIMETRY (MOD):  EF-A4C View: 70.9 % EF-A2C View: 73.3 % EF-Biplane: 72 %    LV DIASTOLIC FUNCTION:  MV Peak E: 0.66 m/s e', MV Gina: 0.09 m/s  MV Peak A: 0.70 m/s E/e' Ratio: 7.64  E/A Ratio: 0.95     Decel Time: 236 msec    SPECTRAL DOPPLER ANALYSIS (where applicable):  Mitral Valve:  MV P1/2 Time: 68.32 msec  MV Area, PHT: 3.22 cm²    Aortic Valve: AoV Max Christiano: 1.43 m/s AoV Peak P.2 mmHg AoV MeanP.5 mmHg    LVOT Vmax: 1.11 m/s LVOT VTI: 0.314 m LVOT Diameter: 2.38 cm    AoV Area, Vmax: 3.47 cm² AoV Area, VTI: 4.36 cm² AoV Area, Vmn:  Ao VTI: 0.322  Tricuspid Valve and PA/RV Systolic Pressure: TR Max Velocity:  RA   Pressure: 8 mmHg RVSP/PASP:    Pulmonic Valve:  PV Max Velocity: 1.08 m/s PV Max P.7 mmHg PV Mean PG:       PHYSICIAN INTERPRETATION:  Left Ventricle: Endocardial visualization was enhanced with intravenous   echo contrast. The left ventricular internal cavity size is normal. Left   ventricular wall thickness is normal.  Global LV systolic function was normal. Left ventricular ejection   fraction, by visual estimation, is 65 to 70%. Normal segmental left   ventricular systolic function. Spectral Doppler shows impaired relaxation   pattern of left ventricular myocardial filling (Grade I diastolic   dysfunction).  Right Ventricle: Normal right ventricular size and function. TV S' 0.2   m/s.  Left Atrium: The left atrium is normal in size.  Right Atrium: The right atrium is normal in size.  Pericardium: There is no evidence of pericardial effusion. There is a   significant pericardial fat pad present. There is a moderate pleural   effusion in the left lateral region.  Mitral Valve: The mitral valve is normalin structure. Mitral leaflet   mobility is normal. There is mild mitral annular calcification. No   evidence of mitral valve regurgitation is seen.  Tricuspid Valve: Structurally normal tricuspid valve, with normal leaflet   excursion.  Aortic Valve:The aortic valve was not well visualized. The aortic valve   is trileaflet. Peak Av velocity is 1.43 m/s, mean transaortic gradient   equals 4.5 mmHg, the calculated aortic valve area equals 4.36 cm² by the   continuity equation consistent with normally opening aortic valve. No   evidence of aortic valve regurgitation is seen.  Pulmonic Valve: The pulmonic valve was not well visualized.  Aorta: The aortic root is normal in size and structure. There is mild   aortic root calcification.  Pulmonary Artery: The pulmonary artery is not well seen.  Venous: The pulmonary veins appear normal. The inferior vena cava was   normal sized, with respiratory size variation less than 50%.       Summary:   1. Left ventricular ejection fraction, by visual estimation, is 65 to   70%.   2. Normal global left ventricular systolic function.   3. Normal left ventricular internal cavity size.   4. Spectral Doppler shows impaired relaxation pattern of left   ventricular myocardial filling (Grade I diastolic dysfunction).   5. Normal right ventricular size and function.   6. The left atrium is normal in size.   7. The right atrium is normal in size.   8. Moderate pleural effusion in the left lateral region.   9. There is a significant pericardial fat pad present.  10. There is no evidence of pericardial effusion.  11. Mild mitral annular calcification.  12. Structurally normal tricuspid valve, with normal leaflet excursion.  13. The pulmonary veins appear normal.  14. Endocardial visualization was enhancedwith intravenous echo contrast.  15. There is mild aortic root calcification.    [id number] Kuldeep Mitchell MD, Electronically signed on 2018 at 12:24:15 PM              *** Final ***                  KULDEEP MITCHELL M.D.  This document has been electronically signed. Dec 20 2018  7:38PM              [facility] --  Tom Rahman Pannella, Patel, Savella, Goodger, Cohen  Office # [phone]    Follow Up:  Pericardial effusion    Subjective/Observations: Pt seen on stretcher, en route to MRI.  No complaints of chest pain, dyspnea, or palpitations reported. No signs of orthopnea or PND. Tolerating RA.       REVIEW OF SYSTEMS: All other review of systems is negative unless indicated above  PAST MEDICAL & SURGICAL HISTORY:  Diabetes Mellitus Type II  Insulin pump ()      GERD (gastroesophageal reflux disease)      Depression      Hypothyroidism      Hyperlipidemia      Kidney transplanted        Hypertension      ANGELIKA (obstructive sleep apnea)      Peripheral neuropathy      Shoulder fracture  Left.      History of colonoscopy      S/P kidney transplant        S/P cholecystectomy      Retroperitoneal fibrosis  s/p surgery      AV fistula  Right arm      S/P right cataract extraction      S/P left cataract extraction      H/O unilateral nephrectomy  Left        MEDICATIONS  (STANDING):  amLODIPine   Tablet 10 milliGRAM(s) Oral daily  ascorbic acid 500 milliGRAM(s) Oral two times a day  aspirin enteric coated 81 milliGRAM(s) Oral daily  azaTHIOprine 50 milliGRAM(s) Oral two times a day  buPROPion XL (24-Hour) . 300 milliGRAM(s) Oral daily  carvedilol 12.5 milliGRAM(s) Oral every 12 hours  cholecalciferol 1000 Unit(s) Oral daily  dextrose 5%. 1000 milliLiter(s) (100 mL/Hr) IV Continuous <Continuous>  dextrose 5%. 1000 milliLiter(s) (50 mL/Hr) IV Continuous <Continuous>  dextrose 50% Injectable 25 Gram(s) IV Push once  dextrose 50% Injectable 12.5 Gram(s) IV Push once  dextrose 50% Injectable 25 Gram(s) IV Push once  ertapenem  IVPB      ertapenem  IVPB 1000 milliGRAM(s) IV Intermittent every 24 hours  escitalopram 10 milliGRAM(s) Oral <User Schedule>  ferrous    sulfate 325 milliGRAM(s) Oral two times a day  glucagon  Injectable 1 milliGRAM(s) IntraMuscular once  hydrALAZINE 100 milliGRAM(s) Oral three times a day  insulin glargine Injectable (LANTUS) 17 Unit(s) SubCutaneous at bedtime  insulin lispro (ADMELOG) corrective regimen sliding scale   SubCutaneous three times a day before meals  levothyroxine 88 MICROGram(s) Oral <User Schedule>  lisinopril 20 milliGRAM(s) Oral daily  mineral oil enema 133 milliLiter(s) Rectal once  polyethylene glycol 3350 17 Gram(s) Oral daily  pyridoxine 50 milliGRAM(s) Oral daily  rosuvastatin 10 milliGRAM(s) Oral at bedtime  senna 2 Tablet(s) Oral at bedtime  sodium chloride 0.45%. 1000 milliLiter(s) (50 mL/Hr) IV Continuous <Continuous>  tacrolimus 2 milliGRAM(s) Oral daily  tacrolimus 1 milliGRAM(s) Oral at bedtime    MEDICATIONS  (PRN):  acetaminophen   IVPB .. 1000 milliGRAM(s) IV Intermittent every 8 hours PRN Temp greater or equal to 38C (100.4F)  aluminum hydroxide/magnesium hydroxide/simethicone Suspension 30 milliLiter(s) Oral every 4 hours PRN Dyspepsia  dextrose Oral Gel 15 Gram(s) Oral once PRN Blood Glucose LESS THAN 70 milliGRAM(s)/deciliter  hydrALAZINE Injectable 10 milliGRAM(s) IV Push every 8 hours PRN SBP >180 and HR 60-70bpm  melatonin 3 milliGRAM(s) Oral at bedtime PRN Insomnia  metoprolol tartrate Injectable 5 milliGRAM(s) IV Push every 6 hours PRN SBP >170  ondansetron Injectable 4 milliGRAM(s) IV Push every 8 hours PRN Nausea and/or Vomiting    Allergies    No Known Allergies    Intolerances      Vital Signs Last 24 Hrs  T(C): 37 (02 Dec 2024 04:53), Max: 37.6 (01 Dec 2024 20:40)  T(F): 98.6 (02 Dec 2024 04:53), Max: 99.6 (01 Dec 2024 20:40)  HR: 79 (02 Dec 2024 04:53) (79 - 82)  BP: 171/80 (02 Dec 2024 04:53) (159/65 - 181/74)  BP(mean): --  RR: 20 (02 Dec 2024 04:53) (18 - 20)  SpO2: 93% (02 Dec 2024 04:53) (93% - 94%)    Parameters below as of 02 Dec 2024 04:53  Patient On (Oxygen Delivery Method): room air      I&O's Summary    01 Dec 2024 07:01  -  02 Dec 2024 07:00  --------------------------------------------------------  IN: 1200 mL / OUT: 2700 mL / NET: -1500 mL        TELE: off  PHYSICAL EXAM:  PHYSICAL EXAM:  Constitutional: NAD, awake and alert  HEENT: Moist Mucous Membranes, Anicteric  Pulmonary: Non-labored, breath sounds are clear bilaterally, No wheezing, rales or rhonchi  Cardiovascular: Regular, S1 and S2, No murmurs, No rubs, gallops or clicks  Gastrointestinal:  soft, nontender, nondistended   Lymph: No peripheral edema. No lymphadenopathy.   Skin: No visible rashes or ulcers.  Psych:  episodes of confusion    LABS: All Labs Reviewed:                        7.3    4.72  )-----------( 228      ( 01 Dec 2024 05:55 )             23.0                         6.9    5.06  )-----------( 218      ( 2024 12:50 )             21.8                         6.8    x     )-----------( x        ( 2024 10:20 )             21.7     01 Dec 2024 05:55    144    |  112    |  23     ----------------------------<  204    3.7     |  29     |  1.00   2024 06:40    144    |  110    |  25     ----------------------------<  171    3.2     |  28     |  1.10     Ca    11.7       01 Dec 2024 05:55  Ca    11.1       2024 06:40  Phos  1.6       01 Dec 2024 05:55  Phos  2.6       2024 06:40  Phos  3.2       2024 17:10  Mg     1.8       01 Dec 2024 05:55  Mg     1.3       2024 06:40    TPro  6.8    /  Alb  2.1    /  TBili  0.6    /  DBili  x      /  AST  59     /  ALT  45     /  AlkPhos  106    01 Dec 2024 05:55  TPro  6.9    /  Alb  x      /  TBili  x      /  DBili  x      /  AST  x      /  ALT  x      /  AlkPhos  x      2024 17:10          12 Lead ECG:   Ventricular Rate 81 BPM    Atrial Rate 81 BPM    P-R Interval 148 ms    QRS Duration 104 ms    Q-T Interval 406 ms    QTC Calculation(Bazett) 471 ms    P Axis 44 degrees    R Axis 12 degrees    T Axis 53 degrees    Diagnosis Line Normal sinus rhythm  Normal ECG  When compared with ECG of 2024 07:11,  Nonspecific T wave abnormality, improved in Lateral leads  Confirmed by Kya Gonzalez ([id number]) on 2024 10:43:18 AM (24 @ 09:28)      EXAM:  ECHO TRANSTHORACIC COMP W DOPP      PROCEDURE DATE:  Dec 20 2018   .      INTERPRETATION:  REPORT:    TRANSTHORACIC ECHOCARDIOGRAM REPORT         Patient Name:   JAN CALVIN Patient Location: Inpatient  Medical Rec #:  [id number] Accession #:      [id number]  Account #:      [id number]          Height:           72.0 in 182.9 cm  YOB: 1955        Weight:           218.0 lb 98.88 kg  Patient Age:    63 years          BSA:              2.21 m²  Patient Gender: M              BP:               126/60 mmHg       Date of Exam:        2018 7:38:45 PM  Sonographer:         Fish Dee  Referring Physician: Patricio Huang MD    Procedure:     2D Echo/Doppler/Color Doppler Complete.  Indications:   Chest pain, unspecified - R07.9  Diagnosis:     Chest pain, unspecified - R07.9  Study Details: Technically difficult study. Study quality was adversely   affected                 due to patient obesity.         2D AND M-MODE MEASUREMENTS (normal ranges within parentheses):  Left                Normal    Aorta/Left           Normal  Ventricle:                    Atrium:  IVSd (2D):    1.02  (0.7-1.1) Left Atrium  3.44 cm (1.9-4.0)                cm              (2D):  LVPWd (2D):   0.97  (0.7-1.1) LA Volume    30.7                cm              Index        ml/m²  LVIDd (2D):   5.39  (3.4-5.7) Right Ventricle:                cm              TAPSE:           2.50 cm  LVIDs (2D):   3.31                cm  LV FS (2D):   38.5  (>25%)                %  LVEF (2D):   68 %  (>55%)  Relative Wall 0.36  (<0.42)  Thickness    LV SYSTOLIC FUNCTION BY 2D PLANIMETRY (MOD):  EF-A4C View: 70.9 % EF-A2C View: 73.3 % EF-Biplane: 72 %    LV DIASTOLIC FUNCTION:  MV Peak E: 0.66 m/s e', MV Gina: 0.09 m/s  MV Peak A: 0.70 m/s E/e' Ratio: 7.64  E/A Ratio: 0.95     Decel Time: 236 msec    SPECTRAL DOPPLER ANALYSIS (where applicable):  Mitral Valve:  MV P1/2 Time: 68.32 msec  MV Area, PHT: 3.22 cm²    Aortic Valve: AoV Max Christiano: 1.43 m/s AoV Peak P.2 mmHg AoV MeanP.5 mmHg    LVOT Vmax: 1.11 m/s LVOT VTI: 0.314 m LVOT Diameter: 2.38 cm    AoV Area, Vmax: 3.47 cm² AoV Area, VTI: 4.36 cm² AoV Area, Vmn:  Ao VTI: 0.322  Tricuspid Valve and PA/RV Systolic Pressure: TR Max Velocity:  RA   Pressure: 8 mmHg RVSP/PASP:    Pulmonic Valve:  PV Max Velocity: 1.08 m/s PV Max P.7 mmHg PV Mean PG:       PHYSICIAN INTERPRETATION:  Left Ventricle: Endocardial visualization was enhanced with intravenous   echo contrast. The left ventricular internal cavity size is normal. Left   ventricular wall thickness is normal.  Global LV systolic function was normal. Left ventricular ejection   fraction, by visual estimation, is 65 to 70%. Normal segmental left   ventricular systolic function. Spectral Doppler shows impaired relaxation   pattern of left ventricular myocardial filling (Grade I diastolic   dysfunction).  Right Ventricle: Normal right ventricular size and function. TV S' 0.2   m/s.  Left Atrium: The left atrium is normal in size.  Right Atrium: The right atrium is normal in size.  Pericardium: There is no evidence of pericardial effusion. There is a   significant pericardial fat pad present. There is a moderate pleural   effusion in the left lateral region.  Mitral Valve: The mitral valve is normalin structure. Mitral leaflet   mobility is normal. There is mild mitral annular calcification. No   evidence of mitral valve regurgitation is seen.  Tricuspid Valve: Structurally normal tricuspid valve, with normal leaflet   excursion.  Aortic Valve:The aortic valve was not well visualized. The aortic valve   is trileaflet. Peak Av velocity is 1.43 m/s, mean transaortic gradient   equals 4.5 mmHg, the calculated aortic valve area equals 4.36 cm² by the   continuity equation consistent with normally opening aortic valve. No   evidence of aortic valve regurgitation is seen.  Pulmonic Valve: The pulmonic valve was not well visualized.  Aorta: The aortic root is normal in size and structure. There is mild   aortic root calcification.  Pulmonary Artery: The pulmonary artery is not well seen.  Venous: The pulmonary veins appear normal. The inferior vena cava was   normal sized, with respiratory size variation less than 50%.       Summary:   1. Left ventricular ejection fraction, by visual estimation, is 65 to   70%.   2. Normal global left ventricular systolic function.   3. Normal left ventricular internal cavity size.   4. Spectral Doppler shows impaired relaxation pattern of left   ventricular myocardial filling (Grade I diastolic dysfunction).   5. Normal right ventricular size and function.   6. The left atrium is normal in size.   7. The right atrium is normal in size.   8. Moderate pleural effusion in the left lateral region.   9. There is a significant pericardial fat pad present.  10. There is no evidence of pericardial effusion.  11. Mild mitral annular calcification.  12. Structurally normal tricuspid valve, with normal leaflet excursion.  13. The pulmonary veins appear normal.  14. Endocardial visualization was enhancedwith intravenous echo contrast.  15. There is mild aortic root calcification.    [id number] Kuldeep Mitchell MD, Electronically signed on 2018 at 12:24:15 PM              *** Final ***                  KULDEEP MITCHELL M.D.  This document has been electronically signed. Dec 20 2018  7:38PM              [facility] --  Tom Rahman Pannella, Patel, Savella, Goodger, Cohen  Office # [phone]    Follow Up:  Pericardial effusion    Subjective/Observations: Pt seen on stretcher, en route to MRI.  No complaints of chest pain, dyspnea, or palpitations reported. No signs of orthopnea or PND. Tolerating RA.       REVIEW OF SYSTEMS: All other review of systems is negative unless indicated above  PAST MEDICAL & SURGICAL HISTORY:  Diabetes Mellitus Type II  Insulin pump ()      GERD (gastroesophageal reflux disease)      Depression      Hypothyroidism      Hyperlipidemia      Kidney transplanted        Hypertension      ANGELIKA (obstructive sleep apnea)      Peripheral neuropathy      Shoulder fracture  Left.      History of colonoscopy      S/P kidney transplant        S/P cholecystectomy      Retroperitoneal fibrosis  s/p surgery      AV fistula  Right arm      S/P right cataract extraction      S/P left cataract extraction      H/O unilateral nephrectomy  Left        MEDICATIONS  (STANDING):  amLODIPine   Tablet 10 milliGRAM(s) Oral daily  ascorbic acid 500 milliGRAM(s) Oral two times a day  aspirin enteric coated 81 milliGRAM(s) Oral daily  azaTHIOprine 50 milliGRAM(s) Oral two times a day  buPROPion XL (24-Hour) . 300 milliGRAM(s) Oral daily  carvedilol 12.5 milliGRAM(s) Oral every 12 hours  cholecalciferol 1000 Unit(s) Oral daily  dextrose 5%. 1000 milliLiter(s) (100 mL/Hr) IV Continuous <Continuous>  dextrose 5%. 1000 milliLiter(s) (50 mL/Hr) IV Continuous <Continuous>  dextrose 50% Injectable 25 Gram(s) IV Push once  dextrose 50% Injectable 12.5 Gram(s) IV Push once  dextrose 50% Injectable 25 Gram(s) IV Push once  ertapenem  IVPB      ertapenem  IVPB 1000 milliGRAM(s) IV Intermittent every 24 hours  escitalopram 10 milliGRAM(s) Oral <User Schedule>  ferrous    sulfate 325 milliGRAM(s) Oral two times a day  glucagon  Injectable 1 milliGRAM(s) IntraMuscular once  hydrALAZINE 100 milliGRAM(s) Oral three times a day  insulin glargine Injectable (LANTUS) 17 Unit(s) SubCutaneous at bedtime  insulin lispro (ADMELOG) corrective regimen sliding scale   SubCutaneous three times a day before meals  levothyroxine 88 MICROGram(s) Oral <User Schedule>  lisinopril 20 milliGRAM(s) Oral daily  mineral oil enema 133 milliLiter(s) Rectal once  polyethylene glycol 3350 17 Gram(s) Oral daily  pyridoxine 50 milliGRAM(s) Oral daily  rosuvastatin 10 milliGRAM(s) Oral at bedtime  senna 2 Tablet(s) Oral at bedtime  sodium chloride 0.45%. 1000 milliLiter(s) (50 mL/Hr) IV Continuous <Continuous>  tacrolimus 2 milliGRAM(s) Oral daily  tacrolimus 1 milliGRAM(s) Oral at bedtime    MEDICATIONS  (PRN):  acetaminophen   IVPB .. 1000 milliGRAM(s) IV Intermittent every 8 hours PRN Temp greater or equal to 38C (100.4F)  aluminum hydroxide/magnesium hydroxide/simethicone Suspension 30 milliLiter(s) Oral every 4 hours PRN Dyspepsia  dextrose Oral Gel 15 Gram(s) Oral once PRN Blood Glucose LESS THAN 70 milliGRAM(s)/deciliter  hydrALAZINE Injectable 10 milliGRAM(s) IV Push every 8 hours PRN SBP >180 and HR 60-70bpm  melatonin 3 milliGRAM(s) Oral at bedtime PRN Insomnia  metoprolol tartrate Injectable 5 milliGRAM(s) IV Push every 6 hours PRN SBP >170  ondansetron Injectable 4 milliGRAM(s) IV Push every 8 hours PRN Nausea and/or Vomiting    Allergies    No Known Allergies    Intolerances      Vital Signs Last 24 Hrs  T(C): 37 (02 Dec 2024 04:53), Max: 37.6 (01 Dec 2024 20:40)  T(F): 98.6 (02 Dec 2024 04:53), Max: 99.6 (01 Dec 2024 20:40)  HR: 79 (02 Dec 2024 04:53) (79 - 82)  BP: 171/80 (02 Dec 2024 04:53) (159/65 - 181/74)  BP(mean): --  RR: 20 (02 Dec 2024 04:53) (18 - 20)  SpO2: 93% (02 Dec 2024 04:53) (93% - 94%)    Parameters below as of 02 Dec 2024 04:53  Patient On (Oxygen Delivery Method): room air      I&O's Summary    01 Dec 2024 07:01  -  02 Dec 2024 07:00  --------------------------------------------------------  IN: 1200 mL / OUT: 2700 mL / NET: -1500 mL        TELE: off  PHYSICAL EXAM:  PHYSICAL EXAM:  Constitutional: NAD, awake and alert  HEENT: Moist Mucous Membranes, Anicteric  Pulmonary: Non-labored, breath sounds are clear bilaterally, No wheezing, rales or rhonchi  Cardiovascular: Regular, S1 and S2, No murmurs, No rubs, gallops or clicks  Gastrointestinal:  soft, nontender, nondistended   Lymph: No peripheral edema. No lymphadenopathy.   Skin: No visible rashes or ulcers.  Psych:  episodes of confusion    LABS: All Labs Reviewed:                        7.3    4.72  )-----------( 228      ( 01 Dec 2024 05:55 )             23.0                         6.9    5.06  )-----------( 218      ( 2024 12:50 )             21.8                         6.8    x     )-----------( x        ( 2024 10:20 )             21.7     01 Dec 2024 05:55    144    |  112    |  23     ----------------------------<  204    3.7     |  29     |  1.00   2024 06:40    144    |  110    |  25     ----------------------------<  171    3.2     |  28     |  1.10     Ca    11.7       01 Dec 2024 05:55  Ca    11.1       2024 06:40  Phos  1.6       01 Dec 2024 05:55  Phos  2.6       2024 06:40  Phos  3.2       2024 17:10  Mg     1.8       01 Dec 2024 05:55  Mg     1.3       2024 06:40    TPro  6.8    /  Alb  2.1    /  TBili  0.6    /  DBili  x      /  AST  59     /  ALT  45     /  AlkPhos  106    01 Dec 2024 05:55  TPro  6.9    /  Alb  x      /  TBili  x      /  DBili  x      /  AST  x      /  ALT  x      /  AlkPhos  x      2024 17:10          12 Lead ECG:   Ventricular Rate 81 BPM    Atrial Rate 81 BPM    P-R Interval 148 ms    QRS Duration 104 ms    Q-T Interval 406 ms    QTC Calculation(Bazett) 471 ms    P Axis 44 degrees    R Axis 12 degrees    T Axis 53 degrees    Diagnosis Line Normal sinus rhythm  Normal ECG  When compared with ECG of 2024 07:11,  Nonspecific T wave abnormality, improved in Lateral leads  Confirmed by Kya Gonzalez ([id number]) on 2024 10:43:18 AM (24 @ 09:28)      EXAM:  ECHO TRANSTHORACIC COMP W DOPP      PROCEDURE DATE:  Dec 20 2018   .      INTERPRETATION:  REPORT:    TRANSTHORACIC ECHOCARDIOGRAM REPORT         Patient Name:   JAN CALVIN Patient Location: Inpatient  Medical Rec #:  [id number] Accession #:      [id number]  Account #:      [id number]          Height:           72.0 in 182.9 cm  YOB: 1955        Weight:           218.0 lb 98.88 kg  Patient Age:    63 years          BSA:              2.21 m²  Patient Gender: M              BP:               126/60 mmHg       Date of Exam:        2018 7:38:45 PM  Sonographer:         Fish Dee  Referring Physician: Patricio Huang MD    Procedure:     2D Echo/Doppler/Color Doppler Complete.  Indications:   Chest pain, unspecified - R07.9  Diagnosis:     Chest pain, unspecified - R07.9  Study Details: Technically difficult study. Study quality was adversely   affected                 due to patient obesity.         2D AND M-MODE MEASUREMENTS (normal ranges within parentheses):  Left                Normal    Aorta/Left           Normal  Ventricle:                    Atrium:  IVSd (2D):    1.02  (0.7-1.1) Left Atrium  3.44 cm (1.9-4.0)                cm              (2D):  LVPWd (2D):   0.97  (0.7-1.1) LA Volume    30.7                cm              Index        ml/m²  LVIDd (2D):   5.39  (3.4-5.7) Right Ventricle:                cm              TAPSE:           2.50 cm  LVIDs (2D):   3.31                cm  LV FS (2D):   38.5  (>25%)                %  LVEF (2D):   68 %  (>55%)  Relative Wall 0.36  (<0.42)  Thickness    LV SYSTOLIC FUNCTION BY 2D PLANIMETRY (MOD):  EF-A4C View: 70.9 % EF-A2C View: 73.3 % EF-Biplane: 72 %    LV DIASTOLIC FUNCTION:  MV Peak E: 0.66 m/s e', MV Gina: 0.09 m/s  MV Peak A: 0.70 m/s E/e' Ratio: 7.64  E/A Ratio: 0.95     Decel Time: 236 msec    SPECTRAL DOPPLER ANALYSIS (where applicable):  Mitral Valve:  MV P1/2 Time: 68.32 msec  MV Area, PHT: 3.22 cm²    Aortic Valve: AoV Max Christiano: 1.43 m/s AoV Peak P.2 mmHg AoV MeanP.5 mmHg    LVOT Vmax: 1.11 m/s LVOT VTI: 0.314 m LVOT Diameter: 2.38 cm    AoV Area, Vmax: 3.47 cm² AoV Area, VTI: 4.36 cm² AoV Area, Vmn:  Ao VTI: 0.322  Tricuspid Valve and PA/RV Systolic Pressure: TR Max Velocity:  RA   Pressure: 8 mmHg RVSP/PASP:    Pulmonic Valve:  PV Max Velocity: 1.08 m/s PV Max P.7 mmHg PV Mean PG:       PHYSICIAN INTERPRETATION:  Left Ventricle: Endocardial visualization was enhanced with intravenous   echo contrast. The left ventricular internal cavity size is normal. Left   ventricular wall thickness is normal.  Global LV systolic function was normal. Left ventricular ejection   fraction, by visual estimation, is 65 to 70%. Normal segmental left   ventricular systolic function. Spectral Doppler shows impaired relaxation   pattern of left ventricular myocardial filling (Grade I diastolic   dysfunction).  Right Ventricle: Normal right ventricular size and function. TV S' 0.2   m/s.  Left Atrium: The left atrium is normal in size.  Right Atrium: The right atrium is normal in size.  Pericardium: There is no evidence of pericardial effusion. There is a   significant pericardial fat pad present. There is a moderate pleural   effusion in the left lateral region.  Mitral Valve: The mitral valve is normalin structure. Mitral leaflet   mobility is normal. There is mild mitral annular calcification. No   evidence of mitral valve regurgitation is seen.  Tricuspid Valve: Structurally normal tricuspid valve, with normal leaflet   excursion.  Aortic Valve:The aortic valve was not well visualized. The aortic valve   is trileaflet. Peak Av velocity is 1.43 m/s, mean transaortic gradient   equals 4.5 mmHg, the calculated aortic valve area equals 4.36 cm² by the   continuity equation consistent with normally opening aortic valve. No   evidence of aortic valve regurgitation is seen.  Pulmonic Valve: The pulmonic valve was not well visualized.  Aorta: The aortic root is normal in size and structure. There is mild   aortic root calcification.  Pulmonary Artery: The pulmonary artery is not well seen.  Venous: The pulmonary veins appear normal. The inferior vena cava was   normal sized, with respiratory size variation less than 50%.       Summary:   1. Left ventricular ejection fraction, by visual estimation, is 65 to   70%.   2. Normal global left ventricular systolic function.   3. Normal left ventricular internal cavity size.   4. Spectral Doppler shows impaired relaxation pattern of left   ventricular myocardial filling (Grade I diastolic dysfunction).   5. Normal right ventricular size and function.   6. The left atrium is normal in size.   7. The right atrium is normal in size.   8. Moderate pleural effusion in the left lateral region.   9. There is a significant pericardial fat pad present.  10. There is no evidence of pericardial effusion.  11. Mild mitral annular calcification.  12. Structurally normal tricuspid valve, with normal leaflet excursion.  13. The pulmonary veins appear normal.  14. Endocardial visualization was enhancedwith intravenous echo contrast.  15. There is mild aortic root calcification.    [id number] Kuldeep Mitchell MD, Electronically signed on 2018 at 12:24:15 PM              *** Final ***                  KULDEEP MITCHELL M.D.  This document has been electronically signed. Dec 20 2018  7:38PM

## 2024-12-02 NOTE — DIETITIAN INITIAL EVALUATION ADULT - SIGNS/SYMPTOMS
as evidenced by change in MS, confusion, fluctuating po 0-100% since admission as evidenced by persistent hyperglycemia, Hbga1c 7.4%, use of lantus

## 2024-12-02 NOTE — DIETITIAN INITIAL EVALUATION ADULT - PERTINENT MEDS FT
MEDICATIONS  (STANDING):  amLODIPine   Tablet 10 milliGRAM(s) Oral daily  ascorbic acid 500 milliGRAM(s) Oral two times a day  aspirin enteric coated 81 milliGRAM(s) Oral daily  azaTHIOprine 50 milliGRAM(s) Oral two times a day  buPROPion XL (24-Hour) . 300 milliGRAM(s) Oral daily  carvedilol 12.5 milliGRAM(s) Oral every 12 hours  cholecalciferol 1000 Unit(s) Oral daily  dextrose 5%. 1000 milliLiter(s) (50 mL/Hr) IV Continuous <Continuous>  dextrose 5%. 1000 milliLiter(s) (100 mL/Hr) IV Continuous <Continuous>  dextrose 50% Injectable 25 Gram(s) IV Push once  dextrose 50% Injectable 12.5 Gram(s) IV Push once  dextrose 50% Injectable 25 Gram(s) IV Push once  ertapenem  IVPB      ertapenem  IVPB 1000 milliGRAM(s) IV Intermittent every 24 hours  escitalopram 10 milliGRAM(s) Oral <User Schedule>  ferrous    sulfate 325 milliGRAM(s) Oral two times a day  glucagon  Injectable 1 milliGRAM(s) IntraMuscular once  hydrALAZINE 100 milliGRAM(s) Oral three times a day  insulin glargine Injectable (LANTUS) 17 Unit(s) SubCutaneous at bedtime  insulin lispro (ADMELOG) corrective regimen sliding scale   SubCutaneous three times a day before meals  levothyroxine 88 MICROGram(s) Oral <User Schedule>  lisinopril 20 milliGRAM(s) Oral daily  mineral oil enema 133 milliLiter(s) Rectal once  polyethylene glycol 3350 17 Gram(s) Oral daily  pyridoxine 50 milliGRAM(s) Oral daily  rosuvastatin 10 milliGRAM(s) Oral at bedtime  senna 2 Tablet(s) Oral at bedtime  sodium chloride 0.45%. 1000 milliLiter(s) (50 mL/Hr) IV Continuous <Continuous>  tacrolimus 2 milliGRAM(s) Oral daily  tacrolimus 1 milliGRAM(s) Oral at bedtime    MEDICATIONS  (PRN):  acetaminophen   IVPB .. 1000 milliGRAM(s) IV Intermittent every 8 hours PRN Temp greater or equal to 38C (100.4F)  aluminum hydroxide/magnesium hydroxide/simethicone Suspension 30 milliLiter(s) Oral every 4 hours PRN Dyspepsia  dextrose Oral Gel 15 Gram(s) Oral once PRN Blood Glucose LESS THAN 70 milliGRAM(s)/deciliter  hydrALAZINE Injectable 10 milliGRAM(s) IV Push every 8 hours PRN SBP >180 and HR 60-70bpm  melatonin 3 milliGRAM(s) Oral at bedtime PRN Insomnia  metoprolol tartrate Injectable 5 milliGRAM(s) IV Push every 6 hours PRN SBP >170  ondansetron Injectable 4 milliGRAM(s) IV Push every 8 hours PRN Nausea and/or Vomiting

## 2024-12-02 NOTE — PROGRESS NOTE ADULT - ASSESSMENT
Patient is a 68yo M, PMH DM, HTN, HLD, h/o kidney transplant, hypothyroidism, presents to ED from [facility] for AMS likely metabolic encephalopathy       AMS 2/2 Sepsis 2/2 multiple infections (UTI, sacral infection, possible osteomyelitis), E coli bacteremia   Sepsis 2/2 ESBL Ecoli UTI and bacteremia. sensitive to ertapenem.  - C/W  Meropenum. d/w  ID Dr. Saul     -Tylenol PRN pain and fever  - diet as tolerated   - aspiration and fall risk  - Continue Senna, Miralax, PRN dulcolax suppositories   - MR pelvis/sacrum to eval for osteomyelitis, may need bone culture to further eval  - Per Ortho no surgical intervention for fracture of sacrum      Acute on Chronic anemia :  - Likely due to infection,  and CKD- No signs of bleeding noted   -Check Iron/ TIBC, Ferritin Elevated  - D/w Hem Dr. Sal and Nephro Dr. Bruce,   - Will  monitor for now.   -Will transfuse for Hb < 7.0. D/e Hem,     Hepatic lesion  CT abd with 5.5cm lesion on R hepatic lobe  f/u MR abdomen to further evaluate    Diabetes Mellitus  Diet  Pre meal Insulin   Lantus to 17u QHS (from home dose 35u, increase as tolerated according to fingerstick).  fingerstick glucose monitoring Q6h  ISS  A1c 7.4     HTN: Uncontrolled   Continue Lisinopril 20mg daily with hold parameters  Continue Hydralazine 100mg TID with hold parameters  Continue Coreg, switched from Metoprolol. Will increase dose, since BP still elevated. D/w cardio   Continue Amlodipine 10mg daily    HLD  Continue Crestor 10mg daily    s/p Kidney transplant  Continue Tacrolimus 2mg daily  Continue Tacrolimus 1mg QHS  Continue Azathioprine 50mg BID  Nephrology consulted     Hypothyroidism  Continue Levothyroxine 88mcg QAM    Depression  Continue Escitalopram 10mg TID  Continue Bupropion 300mg daily    DVT ppx: Hold AC due to anemia and risk of bleeding   Dispo: DC planning pending hospital course             Patient is a 68yo M, PMH DM, HTN, HLD, h/o kidney transplant, hypothyroidism, presents to ED from [facility] for AMS likely metabolic encephalopathy       AMS 2/2 Sepsis 2/2 multiple infections (UTI, sacral infection, possible osteomyelitis), E coli bacteremia   Sepsis 2/2 ESBL Ecoli UTI and bacteremia. sensitive to ertapenem.  - C/W  Meropenum 1gm q24h x10 day course until 12/10 d/w  ID Dr. Saul. will need midline   -Tylenol PRN pain and fever  - diet as tolerated   - aspiration and fall risk  - Continue Senna, Miralax, PRN dulcolax suppositories   - MR pelvis/sacrum to eval for osteomyelitis, may need bone culture to further eval  - Per Ortho no surgical intervention for fracture of sacrum      Acute on Chronic anemia :  - Likely due to infection,  and CKD- No signs of bleeding noted   -Check Iron/ TIBC, Ferritin Elevated  - D/w Hem Dr. Sal and Nephro Dr. Bruce,   - Will  monitor for now.   -Will transfuse for Hb < 7.0. D/e Hem,     Hepatic lesion  CT abd with 5.5cm lesion on R hepatic lobe  f/u MR abdomen to further evaluate    Diabetes Mellitus  Diet  Pre meal Insulin   Lantus to 17u QHS (from home dose 35u, increase as tolerated according to fingerstick).  fingerstick glucose monitoring Q6h  ISS  A1c 7.4     HTN: Uncontrolled   Continue Lisinopril 20mg daily with hold parameters  Continue Hydralazine 100mg TID with hold parameters  Continue Coreg, switched from Metoprolol. Will increase dose, since BP still elevated. D/w cardio   Continue Amlodipine 10mg daily    HLD  Continue Crestor 10mg daily    s/p Kidney transplant/CKD 3  Low mag and PO4, replaced  Continue Tacrolimus 2mg daily  Continue Tacrolimus 1mg QHS  Continue Azathioprine 50mg BID  Nephrology consulted     Hypothyroidism  Continue Levothyroxine 88mcg QAM    Depression  Continue Escitalopram 10mg TID  Continue Bupropion 300mg daily    DVT ppx: Hold AC due to anemia and risk of bleeding   Dispo: DC planning pending hospital course

## 2024-12-02 NOTE — PROGRESS NOTE ADULT - SUBJECTIVE AND OBJECTIVE BOX
[facility], Division of Infectious Diseases  [facility] Mccoy Y. Patel, S. [facility], [facility]  [phone]    Name: JAN CALVIN  Age: 69y  Gender: Male  MRN: [id number]    Interval History:  Patient seen and examined at bedside  No acute overnight events. Afebrile  Notes reviewed    Antibiotics:  ertapenem  IVPB      ertapenem  IVPB 1000 milliGRAM(s) IV Intermittent every 24 hours      Medications:  acetaminophen   IVPB .. 1000 milliGRAM(s) IV Intermittent every 8 hours PRN  aluminum hydroxide/magnesium hydroxide/simethicone Suspension 30 milliLiter(s) Oral every 4 hours PRN  amLODIPine   Tablet 10 milliGRAM(s) Oral daily  ascorbic acid 500 milliGRAM(s) Oral two times a day  aspirin enteric coated 81 milliGRAM(s) Oral daily  azaTHIOprine 50 milliGRAM(s) Oral two times a day  buPROPion XL (24-Hour) . 300 milliGRAM(s) Oral daily  carvedilol 12.5 milliGRAM(s) Oral every 12 hours  cholecalciferol 1000 Unit(s) Oral daily  dextrose 5%. 1000 milliLiter(s) IV Continuous <Continuous>  dextrose 5%. 1000 milliLiter(s) IV Continuous <Continuous>  dextrose 50% Injectable 25 Gram(s) IV Push once  dextrose 50% Injectable 12.5 Gram(s) IV Push once  dextrose 50% Injectable 25 Gram(s) IV Push once  dextrose Oral Gel 15 Gram(s) Oral once PRN  ertapenem  IVPB      ertapenem  IVPB 1000 milliGRAM(s) IV Intermittent every 24 hours  escitalopram 10 milliGRAM(s) Oral <User Schedule>  ferrous    sulfate 325 milliGRAM(s) Oral two times a day  glucagon  Injectable 1 milliGRAM(s) IntraMuscular once  hydrALAZINE 100 milliGRAM(s) Oral three times a day  hydrALAZINE Injectable 10 milliGRAM(s) IV Push every 8 hours PRN  insulin glargine Injectable (LANTUS) 17 Unit(s) SubCutaneous at bedtime  insulin lispro (ADMELOG) corrective regimen sliding scale   SubCutaneous three times a day before meals  levothyroxine 88 MICROGram(s) Oral <User Schedule>  lisinopril 20 milliGRAM(s) Oral daily  magnesium sulfate  IVPB 2 Gram(s) IV Intermittent once  melatonin 3 milliGRAM(s) Oral at bedtime PRN  metoprolol tartrate Injectable 5 milliGRAM(s) IV Push every 6 hours PRN  mineral oil enema 133 milliLiter(s) Rectal once  ondansetron Injectable 4 milliGRAM(s) IV Push every 8 hours PRN  polyethylene glycol 3350 17 Gram(s) Oral daily  potassium phosphate / sodium phosphate Tablet (K-PHOS No. 2) 1 Tablet(s) Oral once  pyridoxine 50 milliGRAM(s) Oral daily  rosuvastatin 10 milliGRAM(s) Oral at bedtime  senna 2 Tablet(s) Oral at bedtime  sodium chloride 0.45%. 1000 milliLiter(s) IV Continuous <Continuous>  tacrolimus 2 milliGRAM(s) Oral daily  tacrolimus 1 milliGRAM(s) Oral at bedtime      Review of Systems:  unable to obtain    Allergies: No Known Allergies    For details regarding the patient's past medical history, social history, family history, and other miscellaneous elements, please refer the initial infectious diseases consultation and/or the admitting history and physical examination for this admission.    Objective:  Vitals:   T(C): 37 (12-02-24 @ 11:54), Max: 37.6 (12-01-24 @ 20:40)  HR: 74 (12-02-24 @ 11:54) (74 - 80)  BP: 154/68 (12-02-24 @ 11:54) (154/68 - 171/80)  RR: 18 (12-02-24 @ 11:54) (18 - 20)  SpO2: 93% (12-02-24 @ 11:54) (93% - 94%)    Physical Examination:  General: no acute distress  HEENT: NC/AT, EOMI,   Cardio: S1, S2 heard, RRR, no murmurs  Resp: breath sounds heard bilaterally, no rales, wheezes or rhonchi  Abd: soft, NT, ND  Ext: no edema or cyanosis  Skin: warm, dry, no visible rash      Laboratory Studies:  CBC:                       7.1    4.14  )-----------( 249      ( 02 Dec 2024 11:50 )             21.8     CMP: 12-02    137  |  104  |  24[H]  ----------------------------<  289[H]  3.5   |  29  |  1.10    Ca    11.2[H]      02 Dec 2024 11:50  Phos  1.9     12-02  Mg     1.5     12-02    TPro  6.7  /  Alb  2.1[L]  /  TBili  0.4  /  DBili  x   /  AST  56[H]  /  ALT  55  /  AlkPhos  118  12-02    LIVER FUNCTIONS - ( 02 Dec 2024 11:50 )  Alb: 2.1 g/dL / Pro: 6.7 g/dL / ALK PHOS: 118 U/L / ALT: 55 U/L / AST: 56 U/L / GGT: x           Urinalysis Basic - ( 02 Dec 2024 11:50 )    Color: x / Appearance: x / SG: x / pH: x  Gluc: 289 mg/dL / Ketone: x  / Bili: x / Urobili: x   Blood: x / Protein: x / Nitrite: x   Leuk Esterase: x / RBC: x / WBC x   Sq Epi: x / Non Sq Epi: x / Bacteria: x        Microbiology: reviewed    Culture - Blood (collected 11-30-24 @ 07:05)  Source: .Blood BLOOD  Preliminary Report (12-02-24 @ 13:01):    No growth at 48 Hours    Culture - Blood (collected 11-30-24 @ 07:00)  Source: .Blood BLOOD  Preliminary Report (12-02-24 @ 13:01):    No growth at 48 Hours    Culture - Urine (collected 11-29-24 @ 11:00)  Source: Catheterized  Final Report (12-01-24 @ 10:17):    50,000 - 99,000 CFU/mL Escherichia coli ESBL  Organism: Escherichia coli ESBL (12-01-24 @ 10:17)  Organism: Escherichia coli ESBL (12-01-24 @ 10:17)      Method Type: CHRIST      -  Ampicillin: R >16 These ampicillin results predict results for amoxicillin      -  Ampicillin/Sulbactam: I 16/8      -  Aztreonam: R >16      -  Cefazolin: R >16 For uncomplicated UTI with K. pneumoniae, E. coli, or P. mirablis: CHRIST <=16 is sensitive and CHRIST >=32 is resistant. This also predicts results for oral agents cefaclor, cefdinir, cefpodoxime, cefprozil, cefuroxime axetil, cephalexin and locarbef for uncomplicated UTI. Note that some isolates may be susceptible to these agents while testing resistant to cefazolin.      -  Cefepime: R >16      -  Ceftriaxone: R >32      -  Cefuroxime: R >16      -  Ciprofloxacin: R >2      -  Ertapenem: S <=0.5      -  Gentamicin: R >8      -  Imipenem: S <=1      -  Levofloxacin: R 4      -  Meropenem: S <=1      -  Nitrofurantoin: S <=32 Should not be used to treat pyelonephritis      -  Piperacillin/Tazobactam: S <=8      -  Tobramycin: R >8      -  Trimethoprim/Sulfamethoxazole: R >2/38    Urinalysis with Rflx Culture (collected 11-29-24 @ 11:00)    Culture - Blood (collected 11-29-24 @ 07:15)  Source: .Blood BLOOD  Gram Stain (11-29-24 @ 21:31):    Growth in aerobic bottle: Gram Negative Rods    Growth in anaerobic bottle: Gram Negative Rods  Final Report (12-01-24 @ 17:40):    Growth in aerobic and anaerobic bottles: Escherichia coli ESBL  Organism: Escherichia coli ESBL (12-01-24 @ 17:40)  Organism: Escherichia coli ESBL (12-01-24 @ 17:40)      Method Type: CHRIST      -  Ampicillin: R >16 These ampicillin results predict results for amoxicillin      -  Ampicillin/Sulbactam: R >16/8      -  Aztreonam: R >16      -  Cefazolin: R >16      -  Cefepime: R >16      -  Ceftriaxone: R >32      -  Ciprofloxacin: R >2      -  Ertapenem: S <=0.5      -  Gentamicin: R >8      -  Imipenem: S <=1      -  Levofloxacin: R 4      -  Meropenem: S <=1      -  Piperacillin/Tazobactam: R <=8      -  Tobramycin: R >8      -  Trimethoprim/Sulfamethoxazole: R >2/38    Culture - Blood (collected 11-29-24 @ 07:10)  Source: .Blood BLOOD  Gram Stain (11-29-24 @ 22:18):    Growth in anaerobic bottle: Gram Negative Rods    Growth in aerobic bottle: Gram Negative Rods  Final Report (12-01-24 @ 17:41):    Growth in aerobic and anaerobic bottles: Escherichia coli ESBL    Direct identification is available within approximately 3-5    hours either by Blood Panel Multiplexed PCR or Direct    MALDI-TOF. Details: [email or web address]  Organism: Blood Culture PCR (12-01-24 @ 17:41)  Organism: Blood Culture PCR (12-01-24 @ 17:41)      Method Type: PCR      -  Escherichia coli: Detec      -  ESBL: Detec      -  CTX-M Resistance Marker: Detec          Radiology: reviewed

## 2024-12-02 NOTE — CARE COORDINATION ASSESSMENT. - NSPASTMEDSURGHISTORY_GEN_ALL_CORE_FT
PAST MEDICAL & SURGICAL HISTORY:  Diabetes Mellitus Type II  Insulin pump (2010)      Peripheral neuropathy      ANGELIKA (obstructive sleep apnea)      Hypertension      Kidney transplanted  2012      Hyperlipidemia      Hypothyroidism      Depression      GERD (gastroesophageal reflux disease)      AV fistula  Right arm      Retroperitoneal fibrosis  s/p surgery      S/P cholecystectomy      S/P kidney transplant  2012      History of colonoscopy      Shoulder fracture  Left.      S/P right cataract extraction      S/P left cataract extraction      H/O unilateral nephrectomy  Left

## 2024-12-02 NOTE — PROGRESS NOTE ADULT - ASSESSMENT
68yo M, PMH DM, HTN, HLD, h/o kidney transplant, hypothyroidism, presents to ED from [facility] for AMS, found to be febrile with positive UA. 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 Cardiology called for pericardial effusion.     - Ct revealed Small left pleural effusion with small loculated components, Small to moderate pericardial effusion. Approximately 5.5 cm low-density lesion anterior right hepatic lobe. Atrophic right kidney. Absent left kidney. 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 Colonic fecal retention. Stercoral proctitis. Patchy sclerosis and osteolysis throughout the bilateral sacrum with pathologic fractures. There is soft tissue with stranding extending throughout the presacral and posterior extraperitoneal pelvic soft tissues. There are a few bubbles of air/gas at the right sacral pathologic fracture, findings suggestive of infection/osteomyelitis.   - Management per primary team   - Orthopedic seen, f/u MRI LSp/Pelvis,   - No acute orthopedic surgical intervention at this time.  - ID and surgery following   - Continue antibiotics     - Please obtain transthoracic echocardiogram to assess the pericardial effusion and to evaluate signs of tamponade  - Pt w/no signs of tamponade on examination    - EKG with nonspecific TWI  - Avoid anticoagulants   - IV fluids as needed. Avoid reducing preload   - No evidence of any meaningful volume overload     - EKG with nonspecific TWI anteriorly. repeat unchanged  - No evidence of any active ischemia   - Continue aspirin and statin     - BP uncontrolled  - Continue Norvasc, Hydralazine, Lisinopril   - Uptitrate coreg for BP control     - Hemoglobin 6-7  - Trend and transfuse per primary    - Monitor and replete lytes, keep K>4, Mg>2.  - Will continue to follow.    Aaliyah Resendez NP  Nurse Practitioner- Cardiology   Call TEAMS 70yo M, PMH DM, HTN, HLD, h/o kidney transplant, hypothyroidism, presents to ED from [facility] for AMS, found to be febrile with positive UA. 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 Cardiology called for pericardial effusion.     - Ct revealed Small left pleural effusion with small loculated components, Small to moderate pericardial effusion. Approximately 5.5 cm low-density lesion anterior right hepatic lobe. Atrophic right kidney. Absent left kidney. 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 Colonic fecal retention. Stercoral proctitis. Patchy sclerosis and osteolysis throughout the bilateral sacrum with pathologic fractures. There is soft tissue with stranding extending throughout the presacral and posterior extraperitoneal pelvic soft tissues. There are a few bubbles of air/gas at the right sacral pathologic fracture, findings suggestive of infection/osteomyelitis.   - Management per primary team   - Orthopedic seen, f/u MRI LSp/Pelvis,   - No acute orthopedic surgical intervention at this time.  - ID and surgery following   - Continue antibiotics     - TTE 11/29 showed normal lv systolic function, ef 60%. normal rv size and function. trace tr. trace pericardial effusion adjacent to posterior lv.   - Pt w/no signs of tamponade on examination    - no evidence of significant vol ol  - Avoid anticoagulants   - IV fluids as needed. Avoid reducing preload     - EKG with nonspecific TWI anteriorly. repeat unchanged  - No evidence of any active ischemia   - Continue aspirin and statin     - BP uncontrolled  - Continue Norvasc, Hydralazine, Lisinopril   - Uptitrate coreg for BP control     - Hemoglobin 6-7  - Trend and transfuse per primary    - Monitor and replete lytes, keep K>4, Mg>2.  - Will continue to follow.    Aaliyah Resendez NP  Nurse Practitioner- Cardiology   Call TEAMS 68yo M, PMH DM, HTN, HLD, h/o kidney transplant, hypothyroidism, presents to ED from [facility] for AMS, found to be febrile with positive UA. 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 Cardiology called for pericardial effusion.     - Ct revealed Small left pleural effusion with small loculated components, Small to moderate pericardial effusion. Approximately 5.5 cm low-density lesion anterior right hepatic lobe. Atrophic right kidney. Absent left kidney. 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 Colonic fecal retention. Stercoral proctitis. Patchy sclerosis and osteolysis throughout the bilateral sacrum with pathologic fractures. There is soft tissue with stranding extending throughout the presacral and posterior extraperitoneal pelvic soft tissues. There are a few bubbles of air/gas at the right sacral pathologic fracture, findings suggestive of infection/osteomyelitis.   - Management per primary team   - Orthopedic seen, f/u MRI LSp/Pelvis,   - No acute orthopedic surgical intervention at this time.  - ID and surgery following   - Continue antibiotics     - TTE 11/29 showed normal lv systolic function, ef 60%. normal rv size and function. trace tr. trace pericardial effusion adjacent to posterior lv.   - Pt w/no signs of tamponade on examination    - no evidence of significant vol ol  - Avoid anticoagulants   - IV fluids as needed. Avoid reducing preload     - EKG with nonspecific TWI anteriorly. repeat unchanged  - No evidence of any active ischemia   - Continue aspirin and statin     - BP uncontrolled  - Continue Norvasc, Hydralazine, Lisinopril   - Uptitrate Lisinopril for BP control     - Hemoglobin 6-7  - Trend and transfuse per primary    - Monitor and replete lytes, keep K>4, Mg>2.  - Will continue to follow.    Aaliyah Resendez NP  Nurse Practitioner- Cardiology   Call TEAMS

## 2024-12-03 LAB
ANION GAP SERPL CALC-SCNC: 4 MMOL/L — LOW (ref 5–17)
BUN SERPL-MCNC: 19 MG/DL — SIGNIFICANT CHANGE UP (ref 7–23)
CALCIUM SERPL-MCNC: 10.8 MG/DL — HIGH (ref 8.5–10.1)
CHLORIDE SERPL-SCNC: 105 MMOL/L — SIGNIFICANT CHANGE UP (ref 96–108)
CO2 SERPL-SCNC: 30 MMOL/L — SIGNIFICANT CHANGE UP (ref 22–31)
CREAT SERPL-MCNC: 0.78 MG/DL — SIGNIFICANT CHANGE UP (ref 0.5–1.3)
EGFR: 98 ML/MIN/1.73M2 — SIGNIFICANT CHANGE UP
GLUCOSE SERPL-MCNC: 196 MG/DL — HIGH (ref 70–99)
HCT VFR BLD CALC: 21.4 % — LOW (ref 39–50)
HGB BLD-MCNC: 7 G/DL — CRITICAL LOW (ref 13–17)
MAGNESIUM SERPL-MCNC: 1.4 MG/DL — LOW (ref 1.6–2.6)
MCHC RBC-ENTMCNC: 29 PG — SIGNIFICANT CHANGE UP (ref 27–34)
MCHC RBC-ENTMCNC: 32.7 G/DL — SIGNIFICANT CHANGE UP (ref 32–36)
MCV RBC AUTO: 88.8 FL — SIGNIFICANT CHANGE UP (ref 80–100)
NRBC # BLD: 0 /100 WBCS — SIGNIFICANT CHANGE UP (ref 0–0)
PHOSPHATE SERPL-MCNC: 2.9 MG/DL — SIGNIFICANT CHANGE UP (ref 2.5–4.5)
PLATELET # BLD AUTO: 234 K/UL — SIGNIFICANT CHANGE UP (ref 150–400)
POTASSIUM SERPL-MCNC: 3.5 MMOL/L — SIGNIFICANT CHANGE UP (ref 3.5–5.3)
POTASSIUM SERPL-SCNC: 3.5 MMOL/L — SIGNIFICANT CHANGE UP (ref 3.5–5.3)
RBC # BLD: 2.41 M/UL — LOW (ref 4.2–5.8)
RBC # FLD: 20.1 % — HIGH (ref 10.3–14.5)
SODIUM SERPL-SCNC: 139 MMOL/L — SIGNIFICANT CHANGE UP (ref 135–145)
VIT D25+D1,25 OH+D1,25 PNL SERPL-MCNC: 102 PG/ML — HIGH (ref 19.9–79.3)
WBC # BLD: 3.34 K/UL — LOW (ref 3.8–10.5)
WBC # FLD AUTO: 3.34 K/UL — LOW (ref 3.8–10.5)

## 2024-12-03 PROCEDURE — 99232 SBSQ HOSP IP/OBS MODERATE 35: CPT

## 2024-12-03 PROCEDURE — 36573 INSJ PICC RS&I 5 YR+: CPT

## 2024-12-03 PROCEDURE — 76705 ECHO EXAM OF ABDOMEN: CPT | Mod: 26

## 2024-12-03 PROCEDURE — 72195 MRI PELVIS W/O DYE: CPT | Mod: 26

## 2024-12-03 RX ORDER — LORAZEPAM 2 MG/1
0.5 TABLET ORAL ONCE
Refills: 0 | Status: DISCONTINUED | OUTPATIENT
Start: 2024-12-03 | End: 2024-12-03

## 2024-12-03 RX ORDER — POTASSIUM CHLORIDE 600 MG/1
20 TABLET, EXTENDED RELEASE ORAL ONCE
Refills: 0 | Status: COMPLETED | OUTPATIENT
Start: 2024-12-03 | End: 2024-12-03

## 2024-12-03 RX ADMIN — Medication 4: at 08:42

## 2024-12-03 RX ADMIN — ROSUVASTATIN CALCIUM 10 MILLIGRAM(S): 5 TABLET, FILM COATED ORAL at 22:01

## 2024-12-03 RX ADMIN — Medication 15 MILLILITER(S): at 12:22

## 2024-12-03 RX ADMIN — Medication 325 MILLIGRAM(S): at 18:14

## 2024-12-03 RX ADMIN — Medication 2 TABLET(S): at 22:01

## 2024-12-03 RX ADMIN — Medication 50 MILLILITER(S): at 05:41

## 2024-12-03 RX ADMIN — ESCITALOPRAM OXALATE 10 MILLIGRAM(S): 10 TABLET, FILM COATED ORAL at 18:14

## 2024-12-03 RX ADMIN — CARVEDILOL 12.5 MILLIGRAM(S): 25 TABLET, FILM COATED ORAL at 17:28

## 2024-12-03 RX ADMIN — HYDRALAZINE HYDROCHLORIDE 100 MILLIGRAM(S): 10 TABLET ORAL at 05:36

## 2024-12-03 RX ADMIN — Medication 81 MILLIGRAM(S): at 12:19

## 2024-12-03 RX ADMIN — Medication 4: at 17:27

## 2024-12-03 RX ADMIN — TACROLIMUS 2 MILLIGRAM(S): 5 CAPSULE ORAL at 12:19

## 2024-12-03 RX ADMIN — AZATHIOPRINE 50 MILLIGRAM(S): 100 TABLET ORAL at 05:38

## 2024-12-03 RX ADMIN — LORAZEPAM 0.5 MILLIGRAM(S): 2 TABLET ORAL at 14:54

## 2024-12-03 RX ADMIN — Medication 50 MILLIGRAM(S): at 12:22

## 2024-12-03 RX ADMIN — POTASSIUM CHLORIDE 20 MILLIEQUIVALENT(S): 600 TABLET, EXTENDED RELEASE ORAL at 18:14

## 2024-12-03 RX ADMIN — Medication 325 MILLIGRAM(S): at 05:35

## 2024-12-03 RX ADMIN — Medication 88 MICROGRAM(S): at 05:36

## 2024-12-03 RX ADMIN — AZATHIOPRINE 50 MILLIGRAM(S): 100 TABLET ORAL at 18:14

## 2024-12-03 RX ADMIN — HYDRALAZINE HYDROCHLORIDE 100 MILLIGRAM(S): 10 TABLET ORAL at 18:13

## 2024-12-03 RX ADMIN — Medication 6: at 12:20

## 2024-12-03 RX ADMIN — LISINOPRIL 20 MILLIGRAM(S): 20 TABLET ORAL at 05:36

## 2024-12-03 RX ADMIN — INSULIN GLARGINE 17 UNIT(S): 100 INJECTION, SOLUTION SUBCUTANEOUS at 21:59

## 2024-12-03 RX ADMIN — HYDRALAZINE HYDROCHLORIDE 100 MILLIGRAM(S): 10 TABLET ORAL at 22:01

## 2024-12-03 RX ADMIN — Medication 500 MILLIGRAM(S): at 05:36

## 2024-12-03 RX ADMIN — Medication 300 MILLIGRAM(S): at 12:19

## 2024-12-03 RX ADMIN — POLYETHYLENE GLYCOL 3350 17 GRAM(S): 17 POWDER, FOR SOLUTION ORAL at 12:22

## 2024-12-03 RX ADMIN — ESCITALOPRAM OXALATE 10 MILLIGRAM(S): 10 TABLET, FILM COATED ORAL at 12:19

## 2024-12-03 RX ADMIN — ERTAPENEM 120 MILLIGRAM(S): 1 INJECTION, POWDER, LYOPHILIZED, FOR SOLUTION INTRAMUSCULAR; INTRAVENOUS at 05:34

## 2024-12-03 RX ADMIN — CARVEDILOL 12.5 MILLIGRAM(S): 25 TABLET, FILM COATED ORAL at 05:36

## 2024-12-03 RX ADMIN — Medication 500 MILLIGRAM(S): at 18:13

## 2024-12-03 RX ADMIN — TACROLIMUS 1 MILLIGRAM(S): 5 CAPSULE ORAL at 22:06

## 2024-12-03 RX ADMIN — ESCITALOPRAM OXALATE 10 MILLIGRAM(S): 10 TABLET, FILM COATED ORAL at 05:35

## 2024-12-03 RX ADMIN — AMLODIPINE BESYLATE 10 MILLIGRAM(S): 10 TABLET ORAL at 05:35

## 2024-12-03 NOTE — CHART NOTE - NSCHARTNOTEFT_GEN_A_CORE
Called by radiologist as pt was found to have hepatic lesion on MRI yesterday which could not be completed as pt was not cooperative. hepatic lesion could be malignant.  Advised to have repeat MRI or RUQ ultra sound.

## 2024-12-03 NOTE — CHART NOTE - NSCHARTNOTEFT_GEN_A_CORE
Called by Dr Stearns radiologist, about Mr Azeem,  MRI pelvis results. concerned to have deep down osteomyelitis or soft tissue tumor. d/w Dr SHINE Saul and as pt has no pain and wound in the sacral area could be ch osteo.  C/W with same treatment.

## 2024-12-03 NOTE — PROGRESS NOTE ADULT - ASSESSMENT
CKD 3, h/o Kidney transplant ~2012, h/o Left Nephrectomy  Diabetes  Hypertension  Sepsis, UTI, Sacral osteomyelitis, Gram negative bacteremia  Hypokalemia  Hypercalcemia  Hypomagnesemia  Hypophosphatemia  Anemia    11/30/24: IV hydration. Potassium and magnesium supplementation. Work up for hypercalcemia as ordered. To continue preadmission immuno suppressants.   Monitor blood sugar levels. Insulin coverage as needed. Dietary restriction. Trend BP and titrate BP meds as needed.   Avoid nephrotoxic meds as possible. IV abx, ID follow up. Will follow electrolytes and renal function trend.   12/01/24: Lower IVF. Trend BP and titrate BP meds as needed. To continue current meds. Phosphorous supplementation. IV abx, ID follow up. Discussed with patients wife at the bedside.   12/02/24: Stable renal indices. To continue current meds. Magnesium and phos supps. May need PRBC tx if no improvement. Will follow electrolytes and renal function trend.   12/03/24: Renal indices remain stable. PRBC transfusion. Avoid nephrotoxic meds as possible. Potassium supplementation.

## 2024-12-03 NOTE — DISCHARGE NOTE PROVIDER - NSDCMRMEDTOKEN_GEN_ALL_CORE_FT
amLODIPine 10 mg oral tablet: 1 tab(s) orally once a day  ascorbic acid 500 mg oral tablet: 1 tab(s) orally 2 times a day  aspirin 81 mg oral delayed release tablet: 1 tab(s) orally once a day  azaTHIOprine 50 mg oral tablet: 1 tab(s) orally 2 times a day  buPROPion 300 mg/24 hours (XL) oral tablet, extended release: 1 tab(s) orally once a day  cholecalciferol 25 mcg (1000 intl units) oral tablet: 1 tab(s) orally once a day  Crestor 10 mg oral tablet: 1 tab(s) orally once a day (at bedtime)  escitalopram 10 mg oral tablet: 1 tab(s) orally 3 times a day  ferrous sulfate: 325 milligram(s) orally 2 times a day  hydrALAZINE 100 mg oral tablet: 1 tab(s) orally 3 times a day  insulin glargine 100 units/mL subcutaneous solution: 35 unit(s) subcutaneous once a day (at bedtime)  levothyroxine 88 mcg (0.088 mg) oral tablet: 1 tab(s) orally once a day  lisinopril 20 mg oral tablet: 1 tab(s) orally once a day  metoprolol tartrate 50 mg oral tablet: 1 tab(s) orally 2 times a day  NovoLOG FlexPen 100 units/mL injectable solution: 10 unit(s) injectable 3 times a day (before meals)  pyridoxine 50 mg oral tablet: 1 tab(s) orally once a day  senna leaf extract oral tablet: 1 tab(s) orally once a day (at bedtime)  tacrolimus 1 mg oral capsule: 1 orally 2 capsules orally in the morning and 1 capsule orally in the evening   amLODIPine 10 mg oral tablet: 1 tab(s) orally once a day  ascorbic acid 500 mg oral tablet: 1 tab(s) orally 2 times a day  azaTHIOprine 50 mg oral tablet: 1 tab(s) orally 2 times a day  buPROPion 300 mg/24 hours (XL) oral tablet, extended release: 1 tab(s) orally once a day  carvedilol 12.5 mg oral tablet: 1 tab(s) orally every 12 hours  cholecalciferol 25 mcg (1000 intl units) oral tablet: 1 tab(s) orally once a day  Crestor 10 mg oral tablet: 1 tab(s) orally once a day (at bedtime)  escitalopram 10 mg oral tablet: 1 tab(s) orally 3 times a day  ferrous sulfate: 325 milligram(s) orally 2 times a day  hydrALAZINE 100 mg oral tablet: 1 tab(s) orally 3 times a day  insulin glargine 100 units/mL subcutaneous solution: 35 unit(s) subcutaneous once a day (at bedtime)  levothyroxine 88 mcg (0.088 mg) oral tablet: 1 tab(s) orally once a day  lisinopril 20 mg oral tablet: 1 tab(s) orally once a day  metoprolol tartrate 50 mg oral tablet: 1 tab(s) orally 2 times a day  NovoLOG FlexPen 100 units/mL injectable solution: 10 unit(s) injectable 3 times a day (before meals)  polyethylene glycol 3350 oral powder for reconstitution: 17 gram(s) orally once a day  predniSONE 20 mg oral tablet: 1 tab(s) orally once a day  pyridoxine 50 mg oral tablet: 1 tab(s) orally once a day  senna leaf extract oral tablet: 1 tab(s) orally once a day (at bedtime)  tacrolimus 1 mg oral capsule: 1 orally 2 capsules orally in the morning and 1 capsule orally in the evening   amLODIPine 10 mg oral tablet: 1 tab(s) orally once a day  ascorbic acid 500 mg oral tablet: 1 tab(s) orally 2 times a day  azaTHIOprine 50 mg oral tablet: 1 tab(s) orally 2 times a day  buPROPion 300 mg/24 hours (XL) oral tablet, extended release: 1 tab(s) orally once a day  carvedilol 12.5 mg oral tablet: 1 tab(s) orally every 12 hours  cholecalciferol 25 mcg (1000 intl units) oral tablet: 1 tab(s) orally once a day  cloNIDine 0.1 mg oral tablet: 1 tab(s) orally 3 times a day  Crestor 10 mg oral tablet: 1 tab(s) orally once a day (at bedtime)  escitalopram 10 mg oral tablet: 1 tab(s) orally 3 times a day  ferrous sulfate: 325 milligram(s) orally 2 times a day  hydrALAZINE 100 mg oral tablet: 1 tab(s) orally 3 times a day  insulin glargine 100 units/mL subcutaneous solution: 35 unit(s) subcutaneous once a day (at bedtime)  levothyroxine 88 mcg (0.088 mg) oral tablet: 1 tab(s) orally once a day  lisinopril 20 mg oral tablet: 1 tab(s) orally once a day  NovoLOG FlexPen 100 units/mL injectable solution: 12 unit(s) injectable 3 times a day (before meals)  polyethylene glycol 3350 oral powder for reconstitution: 17 gram(s) orally once a day  predniSONE 20 mg oral tablet: 1 tab(s) orally once a day  pyridoxine 50 mg oral tablet: 1 tab(s) orally once a day  senna leaf extract oral tablet: 2 tab(s) orally once a day (at bedtime)

## 2024-12-03 NOTE — PROGRESS NOTE ADULT - SUBJECTIVE AND OBJECTIVE BOX
[facility], Division of Infectious Diseases  WANG Mccoy Y. Patel, S. Shah, G. [facility]  [phone]    Name: JAN CALVIN  Age: 69y  Gender: Male  MRN: [id number]    Interval History:  No acute overnight events. Afebrile  No complaints  Notes reviewed    Antibiotics:  ertapenem  IVPB      ertapenem  IVPB 1000 milliGRAM(s) IV Intermittent every 24 hours      Medications:  acetaminophen   IVPB .. 1000 milliGRAM(s) IV Intermittent every 8 hours PRN  aluminum hydroxide/magnesium hydroxide/simethicone Suspension 30 milliLiter(s) Oral every 4 hours PRN  amLODIPine   Tablet 10 milliGRAM(s) Oral daily  ascorbic acid 500 milliGRAM(s) Oral two times a day  aspirin enteric coated 81 milliGRAM(s) Oral daily  azaTHIOprine 50 milliGRAM(s) Oral two times a day  buPROPion XL (24-Hour) . 300 milliGRAM(s) Oral daily  carvedilol 12.5 milliGRAM(s) Oral every 12 hours  dextrose 5%. 1000 milliLiter(s) IV Continuous <Continuous>  dextrose 5%. 1000 milliLiter(s) IV Continuous <Continuous>  dextrose 50% Injectable 25 Gram(s) IV Push once  dextrose 50% Injectable 12.5 Gram(s) IV Push once  dextrose 50% Injectable 25 Gram(s) IV Push once  dextrose Oral Gel 15 Gram(s) Oral once PRN  ertapenem  IVPB      ertapenem  IVPB 1000 milliGRAM(s) IV Intermittent every 24 hours  escitalopram 10 milliGRAM(s) Oral <User Schedule>  ferrous    sulfate 325 milliGRAM(s) Oral two times a day  glucagon  Injectable 1 milliGRAM(s) IntraMuscular once  hydrALAZINE 100 milliGRAM(s) Oral three times a day  hydrALAZINE Injectable 10 milliGRAM(s) IV Push every 8 hours PRN  insulin glargine Injectable (LANTUS) 17 Unit(s) SubCutaneous at bedtime  insulin lispro (ADMELOG) corrective regimen sliding scale   SubCutaneous three times a day before meals  levothyroxine 88 MICROGram(s) Oral <User Schedule>  lisinopril 20 milliGRAM(s) Oral daily  melatonin 3 milliGRAM(s) Oral at bedtime PRN  metoprolol tartrate Injectable 5 milliGRAM(s) IV Push every 6 hours PRN  mineral oil enema 133 milliLiter(s) Rectal once  multivitamin/minerals/iron Oral Solution (CENTRUM) 15 milliLiter(s) Oral daily  ondansetron Injectable 4 milliGRAM(s) IV Push every 8 hours PRN  polyethylene glycol 3350 17 Gram(s) Oral daily  potassium chloride    Tablet ER 20 milliEquivalent(s) Oral once  pyridoxine 50 milliGRAM(s) Oral daily  rosuvastatin 10 milliGRAM(s) Oral at bedtime  senna 2 Tablet(s) Oral at bedtime  sodium chloride 0.45%. 1000 milliLiter(s) IV Continuous <Continuous>  tacrolimus 2 milliGRAM(s) Oral daily  tacrolimus 1 milliGRAM(s) Oral at bedtime      Review of Systems:  unable to obtain    Allergies: No Known Allergies    For details regarding the patient's past medical history, social history, family history, and other miscellaneous elements, please refer the initial infectious diseases consultation and/or the admitting history and physical examination for this admission.    Objective:  Vitals:   T(C): 37.2 (12-03-24 @ 11:44), Max: 37.6 (12-02-24 @ 20:33)  HR: 72 (12-03-24 @ 11:44) (72 - 76)  BP: 150/62 (12-03-24 @ 11:44) (150/62 - 179/63)  RR: 18 (12-03-24 @ 11:44) (18 - 18)  SpO2: 96% (12-03-24 @ 11:44) (91% - 96%)    Physical Examination:  General: no acute distress  HEENT: NC/AT, EOMI,  Cardio: S1, S2 heard, RRR, no murmurs  Resp: breath sounds heard bilaterally, no rales, wheezes or rhonchi  Abd: soft, NT, ND  Ext: no edema or cyanosis  Skin: warm, dry, no visible rash      Laboratory Studies:  CBC:                       7.0    3.34  )-----------( 234      ( 03 Dec 2024 06:05 )             21.4     CMP: 12-03    139  |  105  |  19  ----------------------------<  196[H]  3.5   |  30  |  0.78    Ca    10.8[H]      03 Dec 2024 06:05  Phos  2.9     12-03  Mg     1.4     12-03    TPro  6.7  /  Alb  2.1[L]  /  TBili  0.4  /  DBili  x   /  AST  56[H]  /  ALT  55  /  AlkPhos  118  12-02    LIVER FUNCTIONS - ( 02 Dec 2024 11:50 )  Alb: 2.1 g/dL / Pro: 6.7 g/dL / ALK PHOS: 118 U/L / ALT: 55 U/L / AST: 56 U/L / GGT: x           Urinalysis Basic - ( 03 Dec 2024 06:05 )    Color: x / Appearance: x / SG: x / pH: x  Gluc: 196 mg/dL / Ketone: x  / Bili: x / Urobili: x   Blood: x / Protein: x / Nitrite: x   Leuk Esterase: x / RBC: x / WBC x   Sq Epi: x / Non Sq Epi: x / Bacteria: x        Microbiology: reviewed    Culture - Blood (collected 11-30-24 @ 07:05)  Source: .Blood BLOOD  Preliminary Report (12-03-24 @ 13:01):    No growth at 72 Hours    Culture - Blood (collected 11-30-24 @ 07:00)  Source: .Blood BLOOD  Preliminary Report (12-03-24 @ 13:01):    No growth at 72 Hours    Culture - Urine (collected 11-29-24 @ 11:00)  Source: Catheterized  Final Report (12-01-24 @ 10:17):    50,000 - 99,000 CFU/mL Escherichia coli ESBL  Organism: Escherichia coli ESBL (12-01-24 @ 10:17)  Organism: Escherichia coli ESBL (12-01-24 @ 10:17)      Method Type: CHRIST      -  Ampicillin: R >16 These ampicillin results predict results for amoxicillin      -  Ampicillin/Sulbactam: I 16/8      -  Aztreonam: R >16      -  Cefazolin: R >16 For uncomplicated UTI with K. pneumoniae, E. coli, or P. mirablis: CHRIST <=16 is sensitive and CHRIST >=32 is resistant. This also predicts results for oral agents cefaclor, cefdinir, cefpodoxime, cefprozil, cefuroxime axetil, cephalexin and locarbef for uncomplicated UTI. Note that some isolates may be susceptible to these agents while testing resistant to cefazolin.      -  Cefepime: R >16      -  Ceftriaxone: R >32      -  Cefuroxime: R >16      -  Ciprofloxacin: R >2      -  Ertapenem: S <=0.5      -  Gentamicin: R >8      -  Imipenem: S <=1      -  Levofloxacin: R 4      -  Meropenem: S <=1      -  Nitrofurantoin: S <=32 Should not be used to treat pyelonephritis      -  Piperacillin/Tazobactam: S <=8      -  Tobramycin: R >8      -  Trimethoprim/Sulfamethoxazole: R >2/38    Urinalysis with Rflx Culture (collected 11-29-24 @ 11:00)    Culture - Blood (collected 11-29-24 @ 07:15)  Source: .Blood BLOOD  Gram Stain (11-29-24 @ 21:31):    Growth in aerobic bottle: Gram Negative Rods    Growth in anaerobic bottle: Gram Negative Rods  Final Report (12-01-24 @ 17:40):    Growth in aerobic and anaerobic bottles: Escherichia coli ESBL  Organism: Escherichia coli ESBL (12-01-24 @ 17:40)  Organism: Escherichia coli ESBL (12-01-24 @ 17:40)      Method Type: CHRIST      -  Ampicillin: R >16 These ampicillin results predict results for amoxicillin      -  Ampicillin/Sulbactam: R >16/8      -  Aztreonam: R >16      -  Cefazolin: R >16      -  Cefepime: R >16      -  Ceftriaxone: R >32      -  Ciprofloxacin: R >2      -  Ertapenem: S <=0.5      -  Gentamicin: R >8      -  Imipenem: S <=1      -  Levofloxacin: R 4      -  Meropenem: S <=1      -  Piperacillin/Tazobactam: R <=8      -  Tobramycin: R >8      -  Trimethoprim/Sulfamethoxazole: R >2/38    Culture - Blood (collected 11-29-24 @ 07:10)  Source: .Blood BLOOD  Gram Stain (11-29-24 @ 22:18):    Growth in anaerobic bottle: Gram Negative Rods    Growth in aerobic bottle: Gram Negative Rods  Final Report (12-01-24 @ 17:41):    Growth in aerobic and anaerobic bottles: Escherichia coli ESBL    Direct identification is available within approximately 3-5    hours either by Blood Panel Multiplexed PCR or Direct    MALDI-TOF. Details: [email or web address]  Organism: Blood Culture PCR (12-01-24 @ 17:41)  Organism: Blood Culture PCR (12-01-24 @ 17:41)      Method Type: PCR      -  Escherichia coli: Detec      -  ESBL: Detec      -  CTX-M Resistance Marker: Detec          Radiology: reviewed

## 2024-12-03 NOTE — DISCHARGE NOTE PROVIDER - DISCHARGE DATE
How Severe Is Your Atopic Dermatitis?: moderate Is This A New Presentation, Or A Follow-Up?: Follow Up Atopic Dermatitis 08-Dec-2024 17-Dec-2024 18-Dec-2024

## 2024-12-03 NOTE — DISCHARGE NOTE PROVIDER - HOSPITAL COURSE
FROM ADMISSION H+P:   HPI:  Patient is a 70yo M, PMH DM, HTN, HLD, h/o kidney transplant, hypothyroidism, presents to ED from [facility] for AMS. Patient is lethargic and unable to provide history at this time. Information obtained from transfer record and chart.   Patient received Zosyn and Azithromycin 11/12-11/18 for PNA per transfer record.  UA positive for UTI  pan scan revealed:  Small left pleural effusion with small loculated components  Small to moderate pericardial effusion.  Approximately 5.5 cm low-density lesion anterior right hepatic lobe.  Atrophic right kidney.  Absent left kidney.  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  Colonic fecal retention  Stercoral proctitis.  Patchy sclerosis and osteolysis throughout the bilateral sacrum with   pathologic fractures.There is soft tissue with stranding extending   throughout the presacral and posterior extraperitoneal pelvic soft   tissues.  There are a few bubbles of air/gas at the right sacral pathologic   fracture, findings suggestive of   infection/osteomyelitis.     (29 Nov 2024 12:47)      ---  HOSPITAL COURSE:   Patient is a 70yo M, PMH DM, HTN, HLD, h/o kidney transplant, hypothyroidism, presents to ED from [facility] for AMS likely metabolic encephalopathy   Sepsis 2/2 ESBL Ecoli UTI and bacteremia. sensitive to ertapenem.  C/W  Meropenum 1gm q24h x10 day course until 12/10 d/w  ID Dr. Saul.  MR pelvis/sacrum to eval for osteomyelitis, may need bone culture to further eval  Per Ortho no surgical intervention for fracture of sacrum    Acute on Chronic anemia :Likely due to infection,  and CKD- No signs of bleeding noted   Hem Dr. Sal and Nephro Dr. Bruce, followed. received 1 unit of PRBCs  Hepatic lesion, CT abd with 5.5cm lesion on R hepatic lobe  MR abdomen to further evaluate.  CKD3: Low mag and PO4, replaced  Continue Tacrolimus 2mg daily, Continue Azathioprine 50mg BID          ---  CONSULTANTS:     ---  TIME SPENT:  The total amount of time spent reviewing the hospital notes, laboratory values, imaging findings, assessing/counseling the patient, discussing with consultant physicians, social work, nursing staff was -- minutes    ---  FINAL DISCHARGE DIAGNOSIS LIST:  Please see last daily progress note for final discharge diagnoses    ---  Primary care provider was made aware of plan for discharge:      [  ] NO     [  ] YES   FROM ADMISSION H+P:   HPI:  Patient is a 68yo M, PMH DM, HTN, HLD, h/o kidney transplant, hypothyroidism, presents to ED from [facility] for AMS. Patient is lethargic and unable to provide history at this time. Information obtained from transfer record and chart.   Patient received Zosyn and Azithromycin 11/12-11/18 for PNA per transfer record.  UA positive for UTI  pan scan revealed:  Small left pleural effusion with small loculated components  Small to moderate pericardial effusion.  Approximately 5.5 cm low-density lesion anterior right hepatic lobe.  Atrophic right kidney.  Absent left kidney.  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  Colonic fecal retention  Stercoral proctitis.  Patchy sclerosis and osteolysis throughout the bilateral sacrum with   pathologic fractures.There is soft tissue with stranding extending   throughout the presacral and posterior extraperitoneal pelvic soft   tissues.  There are a few bubbles of air/gas at the right sacral pathologic   fracture, findings suggestive of   infection/osteomyelitis.     (29 Nov 2024 12:47)      ---  HOSPITAL COURSE:   Patient is a 68yo M, PMH DM, HTN, HLD, h/o kidney transplant, hypothyroidism, presents to ED from [facility] for AMS 2/2 Sepsis poa  2/2 multiple infections (UTI, sacral infection, possible osteomyelitis), E coli bacteremia  cause of acute  metabolic encephalopathy   Sepsis  poa   2/2 ESBL Ecoli UTI and bacteremia. sensitive to ertapenem.  C/W  Meropenum 1gm q24h x10 day course until 12/10 d/w  ID Dr. Saul   repeat blood cult neg   .       Acute on Chronic anemia :Likely due to infection,  and CKD- No signs of bleeding noted   Hem Dr. Sal and Nephro Dr. Bruce, followed. received 1 unit of PRBCs hb stable   Continue Tacrolimus 2mg daily, Continue Azathioprine 50mg BID    ct  abd with 5.5cm lesion on R hepatic lobe also noticed  retroperitoneal  fibrosis  and spleen mass      hem/onc   DR SAL / and nephro dr bruce   would like to  repeat   liver biopsy by ir next wk   after mri abd     -     histopath not fully  reported by  [facility] in sept /2024   paper work  . wife aware about liver and spleen  mass and  benin last biopsy report  - ok for repeat biopsy if needed .         ---  CONSULTANTS:     ---  TIME SPENT:  The total amount of time spent reviewing the hospital notes, laboratory values, imaging findings, assessing/counseling the patient, discussing with consultant physicians, social work, nursing staff was -- minutes    ---  FINAL DISCHARGE DIAGNOSIS LIST:  Please see last daily progress note for final discharge diagnoses    ---  Primary care provider was made aware of plan for discharge:      [  ] NO     [  ] YES   FROM ADMISSION H+P:   HPI:  Patient is a 70yo M, PMH DM, HTN, HLD, h/o kidney transplant, hypothyroidism, presents to ED from [facility] for AMS. Patient is lethargic and unable to provide history at this time. Information obtained from transfer record and chart.   Patient received Zosyn and Azithromycin 11/12-11/18 for PNA per transfer record.  UA positive for UTI  pan scan revealed:  Small left pleural effusion with small loculated components  Small to moderate pericardial effusion.  Approximately 5.5 cm low-density lesion anterior right hepatic lobe.  Atrophic right kidney.  Absent left kidney.  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  Colonic fecal retention  Stercoral proctitis.  Patchy sclerosis and osteolysis throughout the bilateral sacrum with   pathologic fractures.There is soft tissue with stranding extending   throughout the presacral and posterior extraperitoneal pelvic soft   tissues.  There are a few bubbles of air/gas at the right sacral pathologic   fracture, findings suggestive of   infection/osteomyelitis.     (29 Nov 2024 12:47)      ---  HOSPITAL COURSE:  70yo M, PMH DM, HTN, HLD, h/o kidney transplant, hypothyroidism, presents to ED from [facility] for AMS likely  acute  metabolic encephalopathy     AMS/ ME likely  2/2 Sepsis 2/2 multiple infections (UTI, sacral infection, possible osteomyelitis), E coli bacteremia. Completed course of Antibiotics, At baseline Mental Status   -Completed course of  Meropenum until 12/10. MR pelvis/sacrum to eval for osteomyelitis-  Again seen are comminuted bilateral sacral lona fractures with marked osseous edema and marked loss of normal T1 fatty marrow. Osseous edema with loss of normal T1 fatty marrow at the caudal aspect of the left posterior iliac bone adjacent to the sacroiliac joint. Previously seen fracture component is better visualized on CT. These findings could be secondary to extensive fracture deformities in an osteopenic patient versus osteomyelitis if there was a history of a soft tissue ulcer extending to bone versus metastatic disease given the marked loss of T1 fatty marrow if there is a history of malignancy. Per Ortho no surgical intervention for fracture of sacrum and recommended PT/ MILO. Also required 1 unit PRBC for acute on chronic anemia. H/h has been stable. Also has Hepatic lesion, per  CT abd with 5.5cm lesion on R hepatic lobe also noticed  retroperitoneal  fibrosis  and spleen mass, histopath not fully reported by  [facility] in sept /2024 paper work. Per  hem/onc  DR PARSON / and nephro dr vizcarra   recommended repeat biopsy since MRI abdomen noted for hepatic lesion.  Now S/p  IR guided biopsy 12/16/2024. Nephro started patient on Prednisone for hypercalcemia. Also s/p calcitonin and  Aredia             ---  CONSULTANTS:     ---  TIME SPENT:  The total amount of time spent reviewing the hospital notes, laboratory values, imaging findings, assessing/counseling the patient, discussing with consultant physicians, social work, nursing staff was -- minutes    ---  FINAL DISCHARGE DIAGNOSIS LIST:  Please see last daily progress note for final discharge diagnoses    ---  Primary care provider was made aware of plan for discharge:      [  ] NO     [  ] YES   FROM ADMISSION H+P:   HPI:  Patient is a 68yo M, PMH DM, HTN, HLD, h/o kidney transplant, hypothyroidism, presents to ED from [facility] for AMS. Patient is lethargic and unable to provide history at this time. Information obtained from transfer record and chart.   Patient received Zosyn and Azithromycin 11/12-11/18 for PNA per transfer record.  UA positive for UTI  pan scan revealed:  Small left pleural effusion with small loculated components  Small to moderate pericardial effusion.  Approximately 5.5 cm low-density lesion anterior right hepatic lobe.  Atrophic right kidney.  Absent left kidney.  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  Colonic fecal retention  Stercoral proctitis.  Patchy sclerosis and osteolysis throughout the bilateral sacrum with   pathologic fractures.There is soft tissue with stranding extending     throughout the presacral and posterior extraperitoneal pelvic soft   tissues.  There are a few bubbles of air/gas at the right sacral pathologic   fracture, findings suggestive of   infection/osteomyelitis.     (29 Nov 2024 12:47)      ---  HOSPITAL COURSE:  68yo M, PMH DM, HTN, HLD, h/o kidney transplant, hypothyroidism, presents to ED from [facility] for AMS likely  acute  metabolic encephalopathy     AMS/ ME likely  2/2 Sepsis 2/2 multiple infections (UTI, sacral infection, possible osteomyelitis), E coli bacteremia. Completed course of Antibiotics, At baseline Mental Status   -Completed course of  Meropenum until 12/10. MR pelvis/sacrum to eval for osteomyelitis-  Again seen are comminuted bilateral sacral lona fractures with marked osseous edema and marked loss of normal T1 fatty marrow. Osseous edema with loss of normal T1 fatty marrow at the caudal aspect of the left posterior iliac bone adjacent to the sacroiliac joint. Previously seen fracture component is better visualized on CT. These findings could be secondary to extensive fracture deformities in an osteopenic patient versus osteomyelitis if there was a history of a soft tissue ulcer extending to bone versus metastatic disease given the marked loss of T1 fatty marrow if there is a history of malignancy. Per Ortho no surgical intervention for fracture of sacrum and recommended PT/ MILO. Also required 1 unit PRBC for acute on chronic anemia. H/h has been stable. Also has Hepatic lesion, per  CT abd with 5.5cm lesion on R hepatic lobe also noticed  retroperitoneal  fibrosis  and spleen mass, histopath not fully reported by  [facility] in sept /2024 paper work. Per  hem/onc  DR PARSON / and nephro dr vizcarra   recommended repeat biopsy since MRI abdomen noted for hepatic lesion.  Now S/p  IR guided biopsy 12/16/2024. Nephro started patient on Prednisone for hypercalcemia. Also s/p calcitonin and  Aredia            ---  CONSULTANTS:     ---  TIME SPENT:  The total amount of time spent reviewing the hospital notes, laboratory values, imaging findings, assessing/counseling the patient, discussing with consultant physicians, social work, nursing staff was -- minutes    ---  FINAL DISCHARGE DIAGNOSIS LIST:  Please see last daily progress note for final discharge diagnoses    ---  Primary care provider was made aware of plan for discharge:      [  ] NO     [  ] YES   FROM ADMISSION H+P:   HPI:  Patient is a 68yo M, PMH DM, HTN, HLD, h/o kidney transplant, hypothyroidism, presents to ED from [facility] for AMS. Patient is lethargic and unable to provide history at this time. Information obtained from transfer record and chart.   Patient received Zosyn and Azithromycin 11/12-11/18 for PNA per transfer record.  UA positive for UTI  pan scan revealed:  Small left pleural effusion with small loculated components  Small to moderate pericardial effusion.  Approximately 5.5 cm low-density lesion anterior right hepatic lobe.  Atrophic right kidney.  Absent left kidney.  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  Colonic fecal retention  Stercoral proctitis.  Patchy sclerosis and osteolysis throughout the bilateral sacrum with   pathologic fractures.There is soft tissue with stranding extending     throughout the presacral and posterior extraperitoneal pelvic soft   tissues.  There are a few bubbles of air/gas at the right sacral pathologic   fracture, findings suggestive of   infection/osteomyelitis.     (29 Nov 2024 12:47)      ---  HOSPITAL COURSE:  68yo M, PMH DM, HTN, HLD, h/o kidney transplant, hypothyroidism, presents to ED from [facility] for AMS likely  acute  metabolic encephalopathy     AMS/ ME likely  2/2 Sepsis poa  2/2 multiple infections (UTI, sacral infection, possible osteomyelitis), E coli bacteremia. Completed course of Antibiotics, At baseline Mental Status   -Completed course of  Meropenum until 12/10. MR pelvis/sacrum to eval for osteomyelitis-  Again seen are comminuted bilateral sacral lona fractures with marked osseous edema and marked loss of normal T1 fatty marrow. Osseous edema with loss of normal T1 fatty marrow at the caudal aspect of the left posterior iliac bone adjacent to the sacroiliac joint. Previously seen fracture component is better visualized on CT. These findings could be secondary to extensive fracture deformities in an osteopenic patient versus osteomyelitis if there was a history of a soft tissue ulcer extending to bone versus metastatic disease given the marked loss of T1 fatty marrow if there is a history of malignancy. Per Ortho no surgical intervention for fracture of sacrum and recommended PT/ [facility]. Also required 1 unit PRBC for acute on chronic anemia. H/h has been stable. Also has Hepatic lesion, per  CT abd with 5.5cm lesion on R hepatic lobe also noticed  retroperitoneal  fibrosis  and spleen mass, histopath not fully reported by  [facility] in sept /2024 paper work. Per  hem/onc  DR PARSON / and nephro dr vizcarra   recommended repeat biopsy since MRI abdomen noted for hepatic lesion.  Now S/p  IR guided biopsy 12/16/2024 FOLLOW UP OUT PT RESULT  WIFE AWARE . Nephro started patient on Prednisone for refectory  hypercalcemia possible  underlying malignancy  is cause    Also s/p calcitonin and  Aredia - follow up out pt ca level closely . medically stable dc to [facility] / wife aware , pt mid line removed  finished iv abx  . day of dc wife called aware all dc / follow up  plan .     medically stable  12/18/24   Vital Signs Last 24 Hrs  T(C): 36.9 (18 Dec 2024 04:55), Max: 36.9 (18 Dec 2024 04:55)  T(F): 98.4 (18 Dec 2024 04:55), Max: 98.4 (18 Dec 2024 04:55)  HR: 68 (18 Dec 2024 04:55) (67 - 68)  BP: 160/54 (18 Dec 2024 04:55) (108/64 - 160/54)  BP(mean): --  RR: 18 (18 Dec 2024 04:55) (18 - 18)  SpO2: 97% (18 Dec 2024 04:55) (97% - 98%)    Parameters below as of 18 Dec 2024 04:55  Patient On (Oxygen Delivery Method): room air      PHYSICAL EXAM:  GEN: NAD, AAOx3 today   HEENT: normocephalic and atraumatic.   NECK: Supple.    LUNGS:  auscultation. No wheezing , no rale   HEART: Regular rate and rhythm,  no murmur   ABDOMEN: Soft, nontender, and nondistended , bs + biopsy site dry intact   :  no intact   SKIN: No rash     ---  TIME SPENT:  The total amount of time spent reviewing the hospital notes, laboratory values, imaging findings, assessing/counseling the patient, discussing with consultant physicians, social work, nursing staff was -45- minutes    ---

## 2024-12-03 NOTE — DISCHARGE NOTE PROVIDER - CARE PROVIDER_API CALL
Mee Cervantes  Medical Oncology  [address], [address]  [place] [address]  Phone: [phone]  Fax: [phone]  Follow Up Time:     James Bruce  Nephrology  [address], [address]  [place] [address]  Phone: [phone]  Fax: [phone]  Follow Up Time:

## 2024-12-03 NOTE — DISCHARGE NOTE PROVIDER - NSDCCPCAREPLAN_GEN_ALL_CORE_FT
PRINCIPAL DISCHARGE DIAGNOSIS  Diagnosis: Sepsis  Assessment and Plan of Treatment: Sepsis 2/2 ESBL Ecoli UTI and bacteremia. sensitive to ertapenem.  Treated with ertapenem and continue to finish on 12/10.   Midline placed.      SECONDARY DISCHARGE DIAGNOSES  Diagnosis: Anemia of chronic disease  Assessment and Plan of Treatment: follow with Hem Dr. Sal and Nephro Dr. Bruce,  Monitor H/H     PRINCIPAL DISCHARGE DIAGNOSIS  Diagnosis: Sepsis  Assessment and Plan of Treatment: Sepsis 2/2 ESBL Ecoli UTI and bacteremia. sensitive to ertapenem.  Completed cousre of antibiotics as inpatient   Hypercalcemia: Continue Prednisone. Monitor calcium levle. Dr. Bruce to follow up  Liver Lesion on MRI: Biopsy taken, Dr. Mee Cervantes/  from Oncology to follow up biopsy result when its available and will d/w you. Their number is attached   please f/u with all your doctors      SECONDARY DISCHARGE DIAGNOSES  Diagnosis: Anemia of chronic disease  Assessment and Plan of Treatment: follow with Hem Dr. Sal and Nephro Dr. Bruce,  Monitor H/H     PRINCIPAL DISCHARGE DIAGNOSIS  Diagnosis: Sepsis  Assessment and Plan of Treatment: Sepsis 2/2 ESBL Ecoli UTI and bacteremia. sensitive to ertapenem.  Completed cousre of antibiotics as inpatient   Hypercalcemia:  possible  sec to underlying malignancy  liver mass  post biopsy  Continue Prednisone. Monitor calcium levle. Dr. Bruce to follow up  Liver Lesion on MRI: Biopsy taken, Dr. Mee Cervantes/  from Oncology to follow up biopsy result when its available and will d/w you. Their number is attached   please f/u with all your doctors      SECONDARY DISCHARGE DIAGNOSES  Diagnosis: Renal transplant recipient  Assessment and Plan of Treatment: continue home meds.    Diagnosis: Uncontrolled type 2 diabetes mellitus with hyperglycemia  Assessment and Plan of Treatment: your blood sugar was high secondary  to on  steriod - follow up closely blood sugar continue home insulin  adjust dose of insulin as per blood sugar level.    Diagnosis: Anemia of chronic disease  Assessment and Plan of Treatment: follow with Hem Dr. Sal and Nephro Dr. Solis- reamin stable post tranfusion . follow up out pt hb /a s with transplant kidney avoid too many   transfusion until urgent .

## 2024-12-03 NOTE — PROGRESS NOTE ADULT - ASSESSMENT
Patient is a 70yo M, PMH DM, HTN, HLD, h/o kidney transplant, hypothyroidism, presents to ED from [facility] for AMS. Patient is lethargic and unable to provide history at this time.    ESBL Bacteremia 2/2 Acute Cystitis  Sacral Wound +/- OM  Fevers  AMS 2/2 above  Febrile in the .5  UA positive for UTI  Flu/COVID/RSV negative  11/29 BCx ESBL E.coli , repeat negative  11/29 UCx   50,000 - 99,000 CFU/mL Escherichia coli    CT CAP w/ Small left pleural effusion with small loculated components  Right pelvic transplant kidney with mild fullness of the pelvic alyceal system.  Stercoral proctitis.  Patchy sclerosis and osteolysis throughout the bilateral sacrum with pathologic fractures. There is soft tissue with stranding extending throughout the presacral and posterior extraperitoneal pelvic soft tissues.  There are a few bubbles of air/gas at the right sacral pathologic fracture, findings suggestive of infection/osteomyelitis.    Reviewed w/ admitting attending, no sacral wound present    Recommendations:   C/w ertapenem 1gm q24h x10 day course until 12/10  --pending midline  Trend temps/WBC  Surgery following  Additional care per primary team    Infectious Diseases will follow. Please call with any questions.  Vanessa Saul M.D.  [facility] Division of Infectious Diseases [phone]  For after 5 P.M. and weekends, please call [phone]  Available on [employer] TEAMS

## 2024-12-03 NOTE — PROGRESS NOTE ADULT - ASSESSMENT
Patient is a 70yo M, PMH DM, HTN, HLD, h/o kidney transplant, hypothyroidism, presents to ED from [facility] for AMS likely metabolic encephalopathy       AMS 2/2 Sepsis 2/2 multiple infections (UTI, sacral infection, possible osteomyelitis), E coli bacteremia   Sepsis 2/2 ESBL Ecoli UTI and bacteremia. sensitive to ertapenem.  - C/W  Meropenum 1gm q24h x10 day course until 12/10 d/w  ID Dr. Saul. will need midline   -Tylenol PRN pain and fever  - diet as tolerated   - aspiration and fall risk  - Continue Senna, Miralax, PRN dulcolax suppositories   - MR pelvis/sacrum to eval for osteomyelitis, may need bone culture to further eval  - Per Ortho no surgical intervention for fracture of sacrum      Acute on Chronic anemia :  - Likely due to infection,  and CKD- No signs of bleeding noted   -Check Iron/ TIBC, Ferritin Elevated  - D/w Hem Dr. Sal and Nephro Dr. Bruce,   - Will  monitor for now.   - Will transfuse for Hb 7.0. and monitor.  - D/w Hem,     Hepatic lesion  CT abd with 5.5cm lesion on R hepatic lobe  f/u MR abdomen to further evaluate    Diabetes Mellitus  Diet  Pre meal Insulin   Lantus to 17u QHS (from home dose 35u, increase as tolerated according to fingerstick).  fingerstick glucose monitoring Q6h  ISS  A1c 7.4     HTN: Uncontrolled   Continue Lisinopril 20mg daily with hold parameters  Continue Hydralazine 100mg TID with hold parameters  Continue Coreg, switched from Metoprolol. Will increase dose, since BP still elevated. D/w cardio   Continue Amlodipine 10mg daily    HLD  Continue Crestor 10mg daily    s/p Kidney transplant/CKD 3  Low mag and PO4, replaced  Continue Tacrolimus 2mg daily  Continue Tacrolimus 1mg QHS  Continue Azathioprine 50mg BID  Nephrology consulted     Hypothyroidism  Continue Levothyroxine 88mcg QAM    Depression  Continue Escitalopram 10mg TID  Continue Bupropion 300mg daily    DVT ppx: Hold AC due to anemia and risk of bleeding   Dispo: DC planning pending hospital course

## 2024-12-03 NOTE — POST DISCHARGE NOTE - NOTIFICATION:
Notified Dr. Didier Pittman of patient's CT findings.  Please contact [email or web address] or [phone] or select “Cancer Care Direct” on the discharge disposition sheet when the patient is close to discharge for assistance in outpatient care.

## 2024-12-03 NOTE — PROVIDER CONTACT NOTE (CRITICAL VALUE NOTIFICATION) - BACKGROUND
Patient is a 69y old  Male who presents with a chief complaint of AMS
Patient is a 69y old  Male who presents with a chief complaint of AMS
Sepsis/Uti

## 2024-12-03 NOTE — DISCHARGE NOTE PROVIDER - NSDCFUADDAPPT_GEN_ALL_CORE_FT
Monitor calcium levlel. Dr. Bruce to follow up Liver Lesion on MRI: Biopsy taken, Dr. Mee Cervantes/  from Oncology to follow up biopsy result when its available and will d/w you. Their number is attached .

## 2024-12-03 NOTE — PROGRESS NOTE ADULT - ASSESSMENT
68yo M, PMH DM, HTN, HLD, h/o kidney transplant, hypothyroidism, presents to ED from [facility] for AMS, found to be febrile with positive UA. 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 Cardiology called for pericardial effusion.     - Ct revealed Small left pleural effusion with small loculated components, Small to moderate pericardial effusion. Approximately 5.5 cm low-density lesion anterior right hepatic lobe. Atrophic right kidney. Absent left kidney. 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 Colonic fecal retention. Stercoral proctitis. Patchy sclerosis and osteolysis throughout the bilateral sacrum with pathologic fractures. There is soft tissue with stranding extending throughout the presacral and posterior extraperitoneal pelvic soft tissues. There are a few bubbles of air/gas at the right sacral pathologic fracture, findings suggestive of infection/osteomyelitis.   - Management per primary team   - Orthopedic seen, f/u MRI LSp/Pelvis,   - No acute orthopedic surgical intervention at this time.  - ID and surgery following   - Continue antibiotics     - TTE 11/29 showed normal lv systolic function, ef 60%. normal rv size and function. trace tr. trace pericardial effusion adjacent to posterior lv.   - Pt w/no signs of tamponade on examination    - no evidence of significant vol ol  - Avoid anticoagulants   - IV fluids as needed. Avoid reducing preload     - EKG with nonspecific TWI anteriorly. repeat unchanged  - No evidence of any active ischemia   - Continue aspirin and statin     - BP uncontrolled  - Continue Norvasc, Hydralazine, Lisinopril   - Uptitrate Lisinopril for BP control     -anemia payne per primary   - Trend and transfuse per primary    - Monitor and replete lytes, keep K>4, Mg>2.  - Will continue to follow.   68yo M, PMH DM, HTN, HLD, h/o kidney transplant, hypothyroidism, presents to ED from [facility] for AMS, found to be febrile with positive UA. 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 Cardiology called for pericardial effusion.     - Ct revealed Small left pleural effusion with small loculated components, Small to moderate pericardial effusion. Approximately 5.5 cm low-density lesion anterior right hepatic lobe. Atrophic right kidney. Absent left kidney. 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 Colonic fecal retention. Stercoral proctitis. Patchy sclerosis and osteolysis throughout the bilateral sacrum with pathologic fractures. There is soft tissue with stranding extending throughout the presacral and posterior extraperitoneal pelvic soft tissues. There are a few bubbles of air/gas at the right sacral pathologic fracture, findings suggestive of infection/osteomyelitis.   - Management per primary team   - Orthopedic seen, f/u MRI LSp/Pelvis,   - No acute orthopedic surgical intervention at this time.  - ID and surgery following   - Continue antibiotics     - TTE 11/29 showed normal lv systolic function, ef 60%. normal rv size and function. trace tr. trace pericardial effusion adjacent to posterior lv.   - Pt w/no signs of tamponade on examination    - no evidence of significant vol ol  - Avoid anticoagulants   - IV fluids as needed. Avoid reducing preload     - EKG with nonspecific TWI anteriorly. repeat unchanged  - No evidence of any active ischemia   - Continue aspirin and statin     - BP uncontrolled  - Continue Norvasc, Hydralazine, Lisinopril   - Uptitrate Lisinopril for BP control - first check BP after all AM medications were administered     -anemia payne per primary   - Trend and transfuse per primary    - Monitor and replete lytes, keep K>4, Mg>2.  - Will continue to follow.

## 2024-12-03 NOTE — PROGRESS NOTE ADULT - SUBJECTIVE AND OBJECTIVE BOX
[facility] -- Tom Rahman Pannella, Patel, Savella Goodger, Cohen  Office # [phone]      Follow Up:  Htn     Subjective/Observations:       REVIEW OF SYSTEMS: All other review of systems is negative unless indicated above    PAST MEDICAL & SURGICAL HISTORY:  Diabetes Mellitus Type II  Insulin pump ()      GERD (gastroesophageal reflux disease)      Depression      Hypothyroidism      Hyperlipidemia      Kidney transplanted        Hypertension      ANGELIKA (obstructive sleep apnea)      Peripheral neuropathy      Shoulder fracture  Left.      History of colonoscopy      S/P kidney transplant        S/P cholecystectomy      Retroperitoneal fibrosis  s/p surgery      AV fistula  Right arm      S/P right cataract extraction      S/P left cataract extraction      H/O unilateral nephrectomy  Left          MEDICATIONS  (STANDING):  amLODIPine   Tablet 10 milliGRAM(s) Oral daily  ascorbic acid 500 milliGRAM(s) Oral two times a day  aspirin enteric coated 81 milliGRAM(s) Oral daily  azaTHIOprine 50 milliGRAM(s) Oral two times a day  buPROPion XL (24-Hour) . 300 milliGRAM(s) Oral daily  carvedilol 12.5 milliGRAM(s) Oral every 12 hours  cholecalciferol 1000 Unit(s) Oral daily  dextrose 5%. 1000 milliLiter(s) (50 mL/Hr) IV Continuous <Continuous>  dextrose 5%. 1000 milliLiter(s) (100 mL/Hr) IV Continuous <Continuous>  dextrose 50% Injectable 25 Gram(s) IV Push once  dextrose 50% Injectable 12.5 Gram(s) IV Push once  dextrose 50% Injectable 25 Gram(s) IV Push once  ertapenem  IVPB      ertapenem  IVPB 1000 milliGRAM(s) IV Intermittent every 24 hours  escitalopram 10 milliGRAM(s) Oral <User Schedule>  ferrous    sulfate 325 milliGRAM(s) Oral two times a day  glucagon  Injectable 1 milliGRAM(s) IntraMuscular once  hydrALAZINE 100 milliGRAM(s) Oral three times a day  insulin glargine Injectable (LANTUS) 17 Unit(s) SubCutaneous at bedtime  insulin lispro (ADMELOG) corrective regimen sliding scale   SubCutaneous three times a day before meals  levothyroxine 88 MICROGram(s) Oral <User Schedule>  lisinopril 20 milliGRAM(s) Oral daily  mineral oil enema 133 milliLiter(s) Rectal once  multivitamin/minerals/iron Oral Solution (CENTRUM) 15 milliLiter(s) Oral daily  polyethylene glycol 3350 17 Gram(s) Oral daily  pyridoxine 50 milliGRAM(s) Oral daily  rosuvastatin 10 milliGRAM(s) Oral at bedtime  senna 2 Tablet(s) Oral at bedtime  sodium chloride 0.45%. 1000 milliLiter(s) (50 mL/Hr) IV Continuous <Continuous>  tacrolimus 2 milliGRAM(s) Oral daily  tacrolimus 1 milliGRAM(s) Oral at bedtime    MEDICATIONS  (PRN):  acetaminophen   IVPB .. 1000 milliGRAM(s) IV Intermittent every 8 hours PRN Temp greater or equal to 38C (100.4F)  aluminum hydroxide/magnesium hydroxide/simethicone Suspension 30 milliLiter(s) Oral every 4 hours PRN Dyspepsia  dextrose Oral Gel 15 Gram(s) Oral once PRN Blood Glucose LESS THAN 70 milliGRAM(s)/deciliter  hydrALAZINE Injectable 10 milliGRAM(s) IV Push every 8 hours PRN SBP >180 and HR 60-70bpm  melatonin 3 milliGRAM(s) Oral at bedtime PRN Insomnia  metoprolol tartrate Injectable 5 milliGRAM(s) IV Push every 6 hours PRN SBP >170  ondansetron Injectable 4 milliGRAM(s) IV Push every 8 hours PRN Nausea and/or Vomiting      Allergies    No Known Allergies    Intolerances        Vital Signs Last 24 Hrs  T(C): 37 (03 Dec 2024 04:55), Max: 37.6 (02 Dec 2024 20:33)  T(F): 98.6 (03 Dec 2024 04:55), Max: 99.7 (02 Dec 2024 20:33)  HR: 76 (03 Dec 2024 04:55) (74 - 76)  BP: 167/69 (03 Dec 2024 04:55) (154/68 - 179/63)  BP(mean): --  RR: 18 (03 Dec 2024 04:55) (18 - 18)  SpO2: 95% (03 Dec 2024 04:55) (91% - 95%)    Parameters below as of 03 Dec 2024 04:55  Patient On (Oxygen Delivery Method): room air        I&O's Summary    02 Dec 2024 07:01  -  03 Dec 2024 07:00  --------------------------------------------------------  IN: 550 mL / OUT: 850 mL / NET: -300 mL          PHYSICAL EXAM:  TELE:   Constitutional: NAD, awake and alert, well-developed  HEENT: Moist Mucous Membranes, Anicteric  Pulmonary: Non-labored, breath sounds are clear bilaterally, No wheezing, crackles or rhonchi  Cardiovascular: Regular, S1 and S2 nl, No murmurs, rubs, gallops or clicks  Gastrointestinal: Bowel Sounds present, soft, nontender.   Lymph: No lymphadenopathy. No peripheral edema.  Skin: No visible rashes or ulcers.  Psych:  Mood & affect appropriate    LABS: All Labs Reviewed:                        7.1    4.14  )-----------( 249      ( 02 Dec 2024 11:50 )             21.8                         7.3    4.72  )-----------( 228      ( 01 Dec 2024 05:55 )             23.0                         6.9    5.06  )-----------( 218      ( 2024 12:50 )             21.8     02 Dec 2024 11:50    137    |  104    |  24     ----------------------------<  289    3.5     |  29     |  1.10   01 Dec 2024 05:55    144    |  112    |  23     ----------------------------<  204    3.7     |  29     |  1.00     Ca    11.2       02 Dec 2024 11:50  Ca    11.7       01 Dec 2024 05:55  Phos  1.9       02 Dec 2024 11:50  Phos  1.6       01 Dec 2024 05:55  Mg     1.5       02 Dec 2024 11:50  Mg     1.8       01 Dec 2024 05:55    TPro  6.7    /  Alb  2.1    /  TBili  0.4    /  DBili  x      /  AST  56     /  ALT  55     /  AlkPhos  118    02 Dec 2024 11:50  TPro  6.8    /  Alb  2.1    /  TBili  0.6    /  DBili  x      /  AST  59     /  ALT  45     /  AlkPhos  106    01 Dec 2024 05:55             EC Lead ECG:   Ventricular Rate 81 BPM    Atrial Rate 81 BPM    P-R Interval 148 ms    QRS Duration 104 ms    Q-T Interval 406 ms    QTC Calculation(Bazett) 471 ms    P Axis 44 degrees    R Axis 12 degrees    T Axis 53 degrees    Diagnosis Line Normal sinus rhythm  Normal ECG  When compared with ECG of 2024 07:11,  Nonspecific T wave abnormality, improved in Lateral leads  Confirmed by Kya Gonzalez ([id number]) on 2024 10:43:18 AM (24 @ 09:28)      TRANSTHORACIC ECHOCARDIOGRAM REPORT  ________________________________________________________________________________                                      _______       Pt. Name:       JAN CALVIN Study Date:    2024  MRN:            [id number]          YOB: 1955  Accession #:    [id number]         Age:           69 years  Account#:       [id number]        Gender:        M  Heart Rate:                       Height:        64.17 in (163.00 cm)  Rhythm:       Weight:        169.75 lb (77.00 kg)  Blood Pressure: 196/81 mmHg       BSA/BMI:       1.83 m² / 28.98 kg/m²  ________________________________________________________________________________________  Referring Physician:    [id number] Ale Ibrahim  Interpreting Physician: Kya Gonzalez MD  Primary Sonographer:    Butch Salazar    CPT:               ECHO TTE WO CON COMP W DOPP - 51989.m  Indication(s):     Cardiac murmur, unspecified - R01.1  Procedure:         Transthoracic echocardiogram with 2-D, M-mode and complete                     spectral and color flow Doppler.  Ordering Location: [facility]  Admission Status:  ED    _______________________________________________________________________________________     CONCLUSIONS:      1. Left ventricular cavity is normal in size. Left ventricular systolic function is normal with an ejection fraction of 60 % by Moreno's method of disks.   2. Trace pericardial effusion noted adjacent to the posterior left ventricle.   3. Normal right ventricular cavity size and normal right ventricular systolic function.   4. Aortic valve anatomy cannot be determined with normal systolic excursion. There is calcification of the aortic valve leaflets.   5. Structurally normal mitral valve with normalleaflet excursion.   6. Structurally normal tricuspid valve with normal leaflet excursion. Trace tricuspid regurgitation.   7. The inferior vena cava is normal in size measuring 1.36 cm in diameter, (normal <2.1cm) with normal inspiratory collapse (normal >50%) consistent with normal right atrial pressure (~3, range 0-5mmHg).    ________________________________________________________________________________________  FINDINGS:     Left Ventricle:  The left ventricular cavity is normal in size. Left ventricular systolic function is normal with a calculated ejection fraction of 60 % by the Moreno's biplane method of disks.     Right Ventricle:  The right ventricular cavity is normal in size and right ventricular systolic function is normal. Tricuspid annular plane systolic excursion (TAPSE) is 2.9 cm (normal >=1.7 cm).     Left Atrium:  The left atrium is normal in size with an indexed volume of 33.15 ml/m².     Right Atrium:  The right atrium is normal in size with an indexed volume of 21.17 ml/m².     Interatrial Septum:  The interatrial septum appears intact.     Aortic Valve:  The aortic valve anatomy cannot be determined with normal systolic excursion. There is calcification of the aortic valve leaflets. The peak transaortic velocity is 2.15 m/s, peak transaortic gradient is 18.5 mmHg and mean transaortic gradient is 11.0 mmHg with an LVOT/aortic valve VTI ratio of 0.64. The aortic valve area is estimated at 2.67 cm² by the continuity equation. There is no evidence of aortic regurgitation.     Mitral Valve:  Structurally normal mitral valve with normal leaflet excursion. There is calcification of the mitral valve annulus.     Tricuspid Valve:  Structurally normal tricuspid valve with normal leaflet excursion. There is trace tricuspid regurgitation. Estimated pulmonary artery systolic pressure is 8 mmHg.     Aorta:  The aortic root at the sinuses of Valsalva is normal in size, measuring 3.50 cm (indexed 1.91 cm/m²). The ascending aorta is dilated, measuring 4.10 cm (indexed 2.24 cm/m²).     Pericardium:  There is a trace pericardial effusion noted adjacent to the posterior left ventricle.     Systemic Veins:  The inferior vena cava is normal in size measuring 1.36 cm in diameter, (normal <2.1cm) with normal inspiratory collapse (normal >50%) consistent with normal right atrial pressure (~3, range 0-5mmHg).  ____________________________________________________________________  QUANTITATIVE DATA:  Left Ventricle Measurements: (Indexed to BSA)     IVSd (2D):   1.0 cm  LVPWd (2D):  1.0 cm  LVIDd (2D):  5.3 cm  LVIDs (2D):  3.6 cm  LV Mass:     203 g  111.2 g/m²  LV Vol d, MOD A2C: 97.8 ml  53.50 ml/m²  LV Vol d, MOD A4C: 121.0 ml 66.19 ml/m²  LV Vol d, MOD BP:  109.5 ml 59.90 ml/m²  LV Vol s, MOD A2C: 36.4 ml  19.91 ml/m²  LV Vol s, MOD A4C: 49.5 ml  27.08 ml/m²  LV Vol s, MOD BP:  44.0 ml  24.04 ml/m²  LVOT SV MOD BP:    65.5 ml  LV EF% MOD BP:     60 %     MV E Vmax:    1.02 m/s  MV A Vmax:    0.93 m/s  MV E/A:       1.10  e' lateral:   13.10 cm/s  e' medial:    9.25 cm/s  E/e' lateral: 7.79  E/e' medial:  11.03  E/e' Average: 9.13  MV DT:        151 msec    Aorta Measurements: (Normal range) (Indexed to BSA)     Ao Root d     3.50 cm (3.1 - 3.7 cm) 1.91 cm/m²  Ao Asc d, 2D: 4.10  Ao Asc prox:  4.10 cm               2.24 cm/m²            Left Atrium Measurements: (Indexed to BSA)  LA Diam 2D: 4.40 cm         Right Ventricle Measurements: Right Atrial Measurements:     TAPSE:            2.9 cm      RA Vol:            38.70 ml  RV Base (RVID1):  3.7cm      RA Vol s, MOD A4C  38.7 ml  RV Mid (RVID2):   3.1 cm      RA Vol Index:      21.17 ml/m²  RV Major (RVID3): 8.0 cm      RA Area s, MOD A4C 14.7 cm²       LVOT / RVOT/ Qp/Qs Data: (Indexed to BSA)  LVOT Diameter:  2.30 cm  LVOT Area:      4.15cm²  LVOT Vmax:      1.34 m/s  LVOT Vmn:       0.949 m/s  LVOT VTI:       26.30 cm  LVOT peak grad: 7 mmHg  LVOT mean grad: 4.0 mmHg  LVOT SV:        109.3 ml  59.78 ml/m²    Aortic Valve Measurements:  AV Vmax:                2.2 m/s  AV Peak Gradient:       18.5 mmHg  AV Mean Gradient:       11.0 mmHg  AV VTI:                 41.0 cm  AV VTI Ratio:           0.64  AoV EOA, Contin:        2.67 cm²  AoV EOA, Contin i:      1.46 cm²/m²  AoV Dimensionless Index 0.64    Mitral Valve Measurements:     MV E Vmax: 1.0 m/s  MV A Vmax: 0.9 m/s  MV E/A:    1.1       Tricuspid Valve Measurements:     TR Vmax:          1.2 m/s  TR Peak Gradient: 5.3 mmHg  RA Pressure:      3 mmHg  PASP:             8 mmHg    ________________________________________________________________________________________  Electronically signed on 2024 at 1:57:15 PM by Kya Gonzalez MD         *** Final ***      Radiology:         [facility] -- Tom Rahman Pannella, Patel, Savella Goodger, Cohen  Office # [phone]      Follow Up:  Htn     Subjective/Observations:   Seen bedside, alert eating breakfast in nad, periods of confusion unable to give any meaningful information but reports he is feeling well     REVIEW OF SYSTEMS: All other review of systems is negative unless indicated above    PAST MEDICAL & SURGICAL HISTORY:  Diabetes Mellitus Type II  Insulin pump ()      GERD (gastroesophageal reflux disease)      Depression      Hypothyroidism      Hyperlipidemia      Kidney transplanted        Hypertension      ANGELIKA (obstructive sleep apnea)      Peripheral neuropathy      Shoulder fracture  Left.      History of colonoscopy      S/P kidney transplant        S/P cholecystectomy      Retroperitoneal fibrosis  s/p surgery      AV fistula  Right arm      S/P right cataract extraction      S/P left cataract extraction      H/O unilateral nephrectomy  Left          MEDICATIONS  (STANDING):  amLODIPine   Tablet 10 milliGRAM(s) Oral daily  ascorbic acid 500 milliGRAM(s) Oral two times a day  aspirin enteric coated 81 milliGRAM(s) Oral daily  azaTHIOprine 50 milliGRAM(s) Oral two times a day  buPROPion XL (24-Hour) . 300 milliGRAM(s) Oral daily  carvedilol 12.5 milliGRAM(s) Oral every 12 hours  cholecalciferol 1000 Unit(s) Oral daily  dextrose 5%. 1000 milliLiter(s) (50 mL/Hr) IV Continuous <Continuous>  dextrose 5%. 1000 milliLiter(s) (100 mL/Hr) IV Continuous <Continuous>  dextrose 50% Injectable 25 Gram(s) IV Push once  dextrose 50% Injectable 12.5 Gram(s) IV Push once  dextrose 50% Injectable 25 Gram(s) IV Push once  ertapenem  IVPB      ertapenem  IVPB 1000 milliGRAM(s) IV Intermittent every 24 hours  escitalopram 10 milliGRAM(s) Oral <User Schedule>  ferrous    sulfate 325 milliGRAM(s) Oral two times a day  glucagon  Injectable 1 milliGRAM(s) IntraMuscular once  hydrALAZINE 100 milliGRAM(s) Oral three times a day  insulin glargine Injectable (LANTUS) 17 Unit(s) SubCutaneous at bedtime  insulin lispro (ADMELOG) corrective regimen sliding scale   SubCutaneous three times a day before meals  levothyroxine 88 MICROGram(s) Oral <User Schedule>  lisinopril 20 milliGRAM(s) Oral daily  mineral oil enema 133 milliLiter(s) Rectal once  multivitamin/minerals/iron Oral Solution (CENTRUM) 15 milliLiter(s) Oral daily  polyethylene glycol 3350 17 Gram(s) Oral daily  pyridoxine 50 milliGRAM(s) Oral daily  rosuvastatin 10 milliGRAM(s) Oral at bedtime  senna 2 Tablet(s) Oral at bedtime  sodium chloride 0.45%. 1000 milliLiter(s) (50 mL/Hr) IV Continuous <Continuous>  tacrolimus 2 milliGRAM(s) Oral daily  tacrolimus 1 milliGRAM(s) Oral at bedtime    MEDICATIONS  (PRN):  acetaminophen   IVPB .. 1000 milliGRAM(s) IV Intermittent every 8 hours PRN Temp greater or equal to 38C (100.4F)  aluminum hydroxide/magnesium hydroxide/simethicone Suspension 30 milliLiter(s) Oral every 4 hours PRN Dyspepsia  dextrose Oral Gel 15 Gram(s) Oral once PRN Blood Glucose LESS THAN 70 milliGRAM(s)/deciliter  hydrALAZINE Injectable 10 milliGRAM(s) IV Push every 8 hours PRN SBP >180 and HR 60-70bpm  melatonin 3 milliGRAM(s) Oral at bedtime PRN Insomnia  metoprolol tartrate Injectable 5 milliGRAM(s) IV Push every 6 hours PRN SBP >170  ondansetron Injectable 4 milliGRAM(s) IV Push every 8 hours PRN Nausea and/or Vomiting      Allergies    No Known Allergies    Intolerances        Vital Signs Last 24 Hrs  T(C): 37 (03 Dec 2024 04:55), Max: 37.6 (02 Dec 2024 20:33)  T(F): 98.6 (03 Dec 2024 04:55), Max: 99.7 (02 Dec 2024 20:33)  HR: 76 (03 Dec 2024 04:55) (74 - 76)  BP: 167/69 (03 Dec 2024 04:55) (154/68 - 179/63)  BP(mean): --  RR: 18 (03 Dec 2024 04:55) (18 - 18)  SpO2: 95% (03 Dec 2024 04:55) (91% - 95%)    Parameters below as of 03 Dec 2024 04:55  Patient On (Oxygen Delivery Method): room air        I&O's Summary    02 Dec 2024 07:01  -  03 Dec 2024 07:00  --------------------------------------------------------  IN: 550 mL / OUT: 850 mL / NET: -300 mL          PHYSICAL EXAM:  Constitutional: NAD, awake and alert, well-developed  HEENT: Moist Mucous Membranes, Anicteric  Pulmonary: Non-labored, breath sounds are clear bilaterally, No wheezing, crackles or rhonchi  Cardiovascular: Regular, S1 and S2 nl, No murmurs, rubs, gallops or clicks  Gastrointestinal: Bowel Sounds present, soft, nontender.   Lymph: No lymphadenopathy. No peripheral edema.  Skin: No visible rashes or ulcers.  Psych:  Mood & affect appropriate    LABS: All Labs Reviewed:                        7.1    4.14  )-----------( 249      ( 02 Dec 2024 11:50 )             21.8                         7.3    4.72  )-----------( 228      ( 01 Dec 2024 05:55 )             23.0                         6.9    5.06  )-----------( 218      ( 2024 12:50 )             21.8     02 Dec 2024 11:50    137    |  104    |  24     ----------------------------<  289    3.5     |  29     |  1.10   01 Dec 2024 05:55    144    |  112    |  23     ----------------------------<  204    3.7     |  29     |  1.00     Ca    11.2       02 Dec 2024 11:50  Ca    11.7       01 Dec 2024 05:55  Phos  1.9       02 Dec 2024 11:50  Phos  1.6       01 Dec 2024 05:55  Mg     1.5       02 Dec 2024 11:50  Mg     1.8       01 Dec 2024 05:55    TPro  6.7    /  Alb  2.1    /  TBili  0.4    /  DBili  x      /  AST  56     /  ALT  55     /  AlkPhos  118    02 Dec 2024 11:50  TPro  6.8    /  Alb  2.1    /  TBili  0.6    /  DBili  x      /  AST  59     /  ALT  45     /  AlkPhos  106    01 Dec 2024 05:55             EC Lead ECG:   Ventricular Rate 81 BPM    Atrial Rate 81 BPM    P-R Interval 148 ms    QRS Duration 104 ms    Q-T Interval 406 ms    QTC Calculation(Bazett) 471 ms    P Axis 44 degrees    R Axis 12 degrees    T Axis 53 degrees    Diagnosis Line Normal sinus rhythm  Normal ECG  When compared with ECG of 2024 07:11,  Nonspecific T wave abnormality, improved in Lateral leads  Confirmed by Kya Gonzalez ([id number]) on 2024 10:43:18 AM (24 @ 09:28)      TRANSTHORACIC ECHOCARDIOGRAM REPORT  ________________________________________________________________________________                                      _______       Pt. Name:       JAN CALVIN Study Date:    2024  MRN:            [id number]          YOB: 1955  Accession #:    [id number]         Age:           69 years  Account#:       [id number]        Gender:        M  Heart Rate:                       Height:        64.17 in (163.00 cm)  Rhythm:       Weight:        169.75 lb (77.00 kg)  Blood Pressure: 196/81 mmHg       BSA/BMI:       1.83 m² / 28.98 kg/m²  ________________________________________________________________________________________  Referring Physician:    [id number] Ale Ibrahim  Interpreting Physician: Kya Gonzalez MD  Primary Sonographer:    Butch Salazar    CPT:               ECHO TTE WO CON COMP W DOPP - 85216.m  Indication(s):     Cardiac murmur, unspecified - R01.1  Procedure:         Transthoracic echocardiogram with 2-D, M-mode and complete                     spectral and color flow Doppler.  Ordering Location: [facility]  Admission Status:  ED    _______________________________________________________________________________________     CONCLUSIONS:      1. Left ventricular cavity is normal in size. Left ventricular systolic function is normal with an ejection fraction of 60 % by Moreno's method of disks.   2. Trace pericardial effusion noted adjacent to the posterior left ventricle.   3. Normal right ventricular cavity size and normal right ventricular systolic function.   4. Aortic valve anatomy cannot be determined with normal systolic excursion. There is calcification of the aortic valve leaflets.   5. Structurally normal mitral valve with normalleaflet excursion.   6. Structurally normal tricuspid valve with normal leaflet excursion. Trace tricuspid regurgitation.   7. The inferior vena cava is normal in size measuring 1.36 cm in diameter, (normal <2.1cm) with normal inspiratory collapse (normal >50%) consistent with normal right atrial pressure (~3, range 0-5mmHg).    ________________________________________________________________________________________  FINDINGS:     Left Ventricle:  The left ventricular cavity is normal in size. Left ventricular systolic function is normal with a calculated ejection fraction of 60 % by the Moreno's biplane method of disks.     Right Ventricle:  The right ventricular cavity is normal in size and right ventricular systolic function is normal. Tricuspid annular plane systolic excursion (TAPSE) is 2.9 cm (normal >=1.7 cm).     Left Atrium:  The left atrium is normal in size with an indexed volume of 33.15 ml/m².     Right Atrium:  The right atrium is normal in size with an indexed volume of 21.17 ml/m².     Interatrial Septum:  The interatrial septum appears intact.     Aortic Valve:  The aortic valve anatomy cannot be determined with normal systolic excursion. There is calcification of the aortic valve leaflets. The peak transaortic velocity is 2.15 m/s, peak transaortic gradient is 18.5 mmHg and mean transaortic gradient is 11.0 mmHg with an LVOT/aortic valve VTI ratio of 0.64. The aortic valve area is estimated at 2.67 cm² by the continuity equation. There is no evidence of aortic regurgitation.     Mitral Valve:  Structurally normal mitral valve with normal leaflet excursion. There is calcification of the mitral valve annulus.     Tricuspid Valve:  Structurally normal tricuspid valve with normal leaflet excursion. There is trace tricuspid regurgitation. Estimated pulmonary artery systolic pressure is 8 mmHg.     Aorta:  The aortic root at the sinuses of Valsalva is normal in size, measuring 3.50 cm (indexed 1.91 cm/m²). The ascending aorta is dilated, measuring 4.10 cm (indexed 2.24 cm/m²).     Pericardium:  There is a trace pericardial effusion noted adjacent to the posterior left ventricle.     Systemic Veins:  The inferior vena cava is normal in size measuring 1.36 cm in diameter, (normal <2.1cm) with normal inspiratory collapse (normal >50%) consistent with normal right atrial pressure (~3, range 0-5mmHg).  ____________________________________________________________________  QUANTITATIVE DATA:  Left Ventricle Measurements: (Indexed to BSA)     IVSd (2D):   1.0 cm  LVPWd (2D):  1.0 cm  LVIDd (2D):  5.3 cm  LVIDs (2D):  3.6 cm  LV Mass:     203 g  111.2 g/m²  LV Vol d, MOD A2C: 97.8 ml  53.50 ml/m²  LV Vol d, MOD A4C: 121.0 ml 66.19 ml/m²  LV Vol d, MOD BP:  109.5 ml 59.90 ml/m²  LV Vol s, MOD A2C: 36.4 ml  19.91 ml/m²  LV Vol s, MOD A4C: 49.5 ml  27.08 ml/m²  LV Vol s, MOD BP:  44.0 ml  24.04 ml/m²  LVOT SV MOD BP:    65.5 ml  LV EF% MOD BP:     60 %     MV E Vmax:    1.02 m/s  MV A Vmax:    0.93 m/s  MV E/A:       1.10  e' lateral:   13.10 cm/s  e' medial:    9.25 cm/s  E/e' lateral: 7.79  E/e' medial:  11.03  E/e' Average: 9.13  MV DT:        151 msec    Aorta Measurements: (Normal range) (Indexed to BSA)     Ao Root d     3.50 cm (3.1 - 3.7 cm) 1.91 cm/m²  Ao Asc d, 2D: 4.10  Ao Asc prox:  4.10 cm               2.24 cm/m²            Left Atrium Measurements: (Indexed to BSA)  LA Diam 2D: 4.40 cm         Right Ventricle Measurements: Right Atrial Measurements:     TAPSE:            2.9 cm      RA Vol:            38.70 ml  RV Base (RVID1):  3.7cm      RA Vol s, MOD A4C  38.7 ml  RV Mid (RVID2):   3.1 cm      RA Vol Index:      21.17 ml/m²  RV Major (RVID3): 8.0 cm      RA Area s, MOD A4C 14.7 cm²       LVOT / RVOT/ Qp/Qs Data: (Indexed to BSA)  LVOT Diameter:  2.30 cm  LVOT Area:      4.15cm²  LVOT Vmax:      1.34 m/s  LVOT Vmn:       0.949 m/s  LVOT VTI:       26.30 cm  LVOT peak grad: 7 mmHg  LVOT mean grad: 4.0 mmHg  LVOT SV:        109.3 ml  59.78 ml/m²    Aortic Valve Measurements:  AV Vmax:                2.2 m/s  AV Peak Gradient:       18.5 mmHg  AV Mean Gradient:       11.0 mmHg  AV VTI:                 41.0 cm  AV VTI Ratio:           0.64  AoV EOA, Contin:        2.67 cm²  AoV EOA, Contin i:      1.46 cm²/m²  AoV Dimensionless Index 0.64    Mitral Valve Measurements:     MV E Vmax: 1.0 m/s  MV A Vmax: 0.9 m/s  MV E/A:    1.1       Tricuspid Valve Measurements:     TR Vmax:          1.2 m/s  TR Peak Gradient: 5.3 mmHg  RA Pressure:      3 mmHg  PASP:             8 mmHg    ________________________________________________________________________________________  Electronically signed on 2024 at 1:57:15 PM by Kya Gonzalez MD         *** Final ***      Radiology:

## 2024-12-03 NOTE — PROGRESS NOTE ADULT - SUBJECTIVE AND OBJECTIVE BOX
Patient is a 69y old  Male who presents with a chief complaint of AMS (30 Nov 2024 17:08)       INTERVAL HPI/OVERNIGHT EVENTS: Patient seen and examined at bedside. Awake, alert. Denies chest pain, palpitations, sob. Answers some of the questions     MEDICATIONS  (STANDING):  amLODIPine   Tablet 10 milliGRAM(s) Oral daily  ascorbic acid 500 milliGRAM(s) Oral two times a day  aspirin enteric coated 81 milliGRAM(s) Oral daily  azaTHIOprine 50 milliGRAM(s) Oral two times a day  buPROPion XL (24-Hour) . 300 milliGRAM(s) Oral daily  carvedilol 6.25 milliGRAM(s) Oral every 12 hours  cholecalciferol 1000 Unit(s) Oral daily  dextrose 5%. 1000 milliLiter(s) (50 mL/Hr) IV Continuous <Continuous>  dextrose 5%. 1000 milliLiter(s) (100 mL/Hr) IV Continuous <Continuous>  dextrose 50% Injectable 25 Gram(s) IV Push once  dextrose 50% Injectable 12.5 Gram(s) IV Push once  dextrose 50% Injectable 25 Gram(s) IV Push once  ertapenem  IVPB      escitalopram 10 milliGRAM(s) Oral <User Schedule>  ferrous    sulfate 325 milliGRAM(s) Oral two times a day  glucagon  Injectable 1 milliGRAM(s) IntraMuscular once  hydrALAZINE 100 milliGRAM(s) Oral three times a day  insulin glargine Injectable (LANTUS) 17 Unit(s) SubCutaneous at bedtime  insulin lispro (ADMELOG) corrective regimen sliding scale   SubCutaneous three times a day before meals  levothyroxine 88 MICROGram(s) Oral <User Schedule>  lisinopril 20 milliGRAM(s) Oral daily  mineral oil enema 133 milliLiter(s) Rectal once  polyethylene glycol 3350 17 Gram(s) Oral daily  potassium phosphate IVPB 30 milliMole(s) IV Intermittent once  pyridoxine 50 milliGRAM(s) Oral daily  rosuvastatin 10 milliGRAM(s) Oral at bedtime  senna 2 Tablet(s) Oral at bedtime  sodium chloride 0.9%. 1000 milliLiter(s) (150 mL/Hr) IV Continuous <Continuous>  tacrolimus 2 milliGRAM(s) Oral daily  tacrolimus 1 milliGRAM(s) Oral at bedtime    MEDICATIONS  (PRN):  acetaminophen   IVPB .. 1000 milliGRAM(s) IV Intermittent every 8 hours PRN Temp greater or equal to 38C (100.4F)  aluminum hydroxide/magnesium hydroxide/simethicone Suspension 30 milliLiter(s) Oral every 4 hours PRN Dyspepsia  dextrose Oral Gel 15 Gram(s) Oral once PRN Blood Glucose LESS THAN 70 milliGRAM(s)/deciliter  hydrALAZINE Injectable 10 milliGRAM(s) IV Push every 8 hours PRN SBP >180 and HR 60-70bpm  melatonin 3 milliGRAM(s) Oral at bedtime PRN Insomnia  metoprolol tartrate Injectable 5 milliGRAM(s) IV Push every 6 hours PRN SBP >170  ondansetron Injectable 4 milliGRAM(s) IV Push every 8 hours PRN Nausea and/or Vomiting      Allergies    No Known Allergies    Intolerances        REVIEW OF SYSTEMS:  Per HPI. All other ROS noted Negative       Vital Signs Last 24 Hrs  T(C): 37.2 (03 Dec 2024 11:44), Max: 37.6 (02 Dec 2024 20:33)  T(F): 98.9 (03 Dec 2024 11:44), Max: 99.7 (02 Dec 2024 20:33)  HR: 72 (03 Dec 2024 11:44) (72 - 76)  BP: 150/62 (03 Dec 2024 11:44) (150/62 - 179/63)  BP(mean): --  RR: 18 (03 Dec 2024 11:44) (18 - 18)  SpO2: 96% (03 Dec 2024 11:44) (91% - 96%)    Parameters below as of 03 Dec 2024 11:44  Patient On (Oxygen Delivery Method): room air        PHYSICAL EXAM:  General: NAD, alert   Head: normocephalic, atraumatic  Eyes: anicteric  ENT: moist mucous membranes, no pharyngeal exudates  Heart: rrr, S1, S2, poss I/VI murmur  Chest: CTA b/l, no rales, rhonchi, or wheezes  Abd: BS+, soft, NT, ND, old midline abd scar   Back: no CVA tenderness  Extr: no edema or cyanosis  Skin: cherry angiomas diffusely    LABS:                        7.0    3.34  )-----------( 234      ( 03 Dec 2024 06:05 )             21.4     03 Dec 2024 06:05    139    |  105    |  19     ----------------------------<  196    3.5     |  30     |  0.78     Ca    10.8       03 Dec 2024 06:05  Phos  2.9       03 Dec 2024 06:05  Mg     1.4       03 Dec 2024 06:05        Urinalysis Basic - ( 03 Dec 2024 06:05 )    Color: x / Appearance: x / SG: x / pH: x  Gluc: 196 mg/dL / Ketone: x  / Bili: x / Urobili: x   Blood: x / Protein: x / Nitrite: x   Leuk Esterase: x / RBC: x / WBC x   Sq Epi: x / Non Sq Epi: x / Bacteria: x      CAPILLARY BLOOD GLUCOSE      POCT Blood Glucose.: 279 mg/dL (03 Dec 2024 11:35)  POCT Blood Glucose.: 201 mg/dL (03 Dec 2024 07:56)  POCT Blood Glucose.: 189 mg/dL (03 Dec 2024 07:28)  POCT Blood Glucose.: 217 mg/dL (02 Dec 2024 20:52)  POCT Blood Glucose.: 184 mg/dL (02 Dec 2024 16:34)    UCx       RADIOLOGY & ADDITIONAL TESTS:      BLOOD CULTURE  11-29 @ 11:00   50,000 - 99,000 CFU/mL Escherichia coli  --  --  11-29 @ 07:15   Growth in aerobic and anaerobic bottles: Escherichia coli  --  --  11-29 @ 07:10   Growth in aerobic and anaerobic bottles: Escherichia coli  Direct identification is available within approximately 3-5  hours either by Blood Panel Multiplexed PCR or Direct  MALDI-TOF. Details: [email or web address]  --  Blood Culture PCR    RADIOLOGY & ADDITIONAL TESTS:    Imaging Personally Reviewed:  [ ] YES     Consultant(s) Notes Reviewed:      Care Discussed with Consultants/Other Providers:

## 2024-12-03 NOTE — PROGRESS NOTE ADULT - SUBJECTIVE AND OBJECTIVE BOX
[facility]                                                       Dr. Bruce, Dr. Prabhakar, Dr. Cabrales, Dr. Zaragoza, Dr. Bingham, Dr. Loya                                      [facility], [facility], [address]                                                 [address] [address]                                      Ph: [phone]  Fax: [phone]                                         Ph: [phone]  Fax: [phone]      Patient is a 69y old  Male who presents with a chief complaint of AMS (01 Dec 2024 11:19)  Patient seen in follow up for CKD, h/o Kidney transplant.        PAST MEDICAL HISTORY:  Diabetes Mellitus Type II    ESRD on Dialysis    GERD (gastroesophageal reflux disease)    Depression    Hypothyroidism    Hyperlipidemia    Kidney transplanted    Hypertension    ANGELIKA (obstructive sleep apnea)    Peripheral neuropathy    Shoulder fracture      MEDICATIONS  (STANDING):  amLODIPine   Tablet 10 milliGRAM(s) Oral daily  ascorbic acid 500 milliGRAM(s) Oral two times a day  aspirin enteric coated 81 milliGRAM(s) Oral daily  azaTHIOprine 50 milliGRAM(s) Oral two times a day  buPROPion XL (24-Hour) . 300 milliGRAM(s) Oral daily  carvedilol 12.5 milliGRAM(s) Oral every 12 hours  dextrose 5%. 1000 milliLiter(s) (100 mL/Hr) IV Continuous <Continuous>  dextrose 5%. 1000 milliLiter(s) (50 mL/Hr) IV Continuous <Continuous>  dextrose 50% Injectable 25 Gram(s) IV Push once  dextrose 50% Injectable 12.5 Gram(s) IV Push once  dextrose 50% Injectable 25 Gram(s) IV Push once  ertapenem  IVPB      ertapenem  IVPB 1000 milliGRAM(s) IV Intermittent every 24 hours  escitalopram 10 milliGRAM(s) Oral <User Schedule>  ferrous    sulfate 325 milliGRAM(s) Oral two times a day  glucagon  Injectable 1 milliGRAM(s) IntraMuscular once  hydrALAZINE 100 milliGRAM(s) Oral three times a day  insulin glargine Injectable (LANTUS) 17 Unit(s) SubCutaneous at bedtime  insulin lispro (ADMELOG) corrective regimen sliding scale   SubCutaneous three times a day before meals  levothyroxine 88 MICROGram(s) Oral <User Schedule>  lisinopril 20 milliGRAM(s) Oral daily  mineral oil enema 133 milliLiter(s) Rectal once  multivitamin/minerals/iron Oral Solution (CENTRUM) 15 milliLiter(s) Oral daily  polyethylene glycol 3350 17 Gram(s) Oral daily  pyridoxine 50 milliGRAM(s) Oral daily  rosuvastatin 10 milliGRAM(s) Oral at bedtime  senna 2 Tablet(s) Oral at bedtime  sodium chloride 0.45%. 1000 milliLiter(s) (50 mL/Hr) IV Continuous <Continuous>  tacrolimus 2 milliGRAM(s) Oral daily  tacrolimus 1 milliGRAM(s) Oral at bedtime    MEDICATIONS  (PRN):  acetaminophen   IVPB .. 1000 milliGRAM(s) IV Intermittent every 8 hours PRN Temp greater or equal to 38C (100.4F)  aluminum hydroxide/magnesium hydroxide/simethicone Suspension 30 milliLiter(s) Oral every 4 hours PRN Dyspepsia  dextrose Oral Gel 15 Gram(s) Oral once PRN Blood Glucose LESS THAN 70 milliGRAM(s)/deciliter  hydrALAZINE Injectable 10 milliGRAM(s) IV Push every 8 hours PRN SBP >180 and HR 60-70bpm  melatonin 3 milliGRAM(s) Oral at bedtime PRN Insomnia  metoprolol tartrate Injectable 5 milliGRAM(s) IV Push every 6 hours PRN SBP >170  ondansetron Injectable 4 milliGRAM(s) IV Push every 8 hours PRN Nausea and/or Vomiting    T(C): 37 (12-03-24 @ 04:55), Max: 37.6 (12-01-24 @ 20:40)  HR: 76 (12-03-24 @ 04:55) (74 - 82)  BP: 167/69 (12-03-24 @ 04:55) (154/68 - 181/74)  RR: 18 (12-03-24 @ 04:55)  SpO2: 95% (12-03-24 @ 04:55)  Wt(kg): --  I&O's Detail    02 Dec 2024 07:01  -  03 Dec 2024 07:00  --------------------------------------------------------  IN:    sodium chloride 0.45%: 550 mL  Total IN: 550 mL    OUT:    Voided (mL): 850 mL  Total OUT: 850 mL    Total NET: -300 mL                PHYSICAL EXAM:  General: No distress  Respiratory: b/l air entry  Cardiovascular: S1 S2  Gastrointestinal: soft  Extremities:  no edema               LABORATORY:                        7.0    3.34  )-----------( 234      ( 03 Dec 2024 06:05 )             21.4     12-03    139  |  105  |  19  ----------------------------<  196[H]  3.5   |  30  |  0.78    Ca    10.8[H]      03 Dec 2024 06:05  Phos  2.9     12-03  Mg     1.4     12-03    TPro  6.7  /  Alb  2.1[L]  /  TBili  0.4  /  DBili  x   /  AST  56[H]  /  ALT  55  /  AlkPhos  118  12-02    Sodium: 139 mmol/L (12-03 @ 06:05)  Sodium: 137 mmol/L (12-02 @ 11:50)    Potassium: 3.5 mmol/L (12-03 @ 06:05)  Potassium: 3.5 mmol/L (12-02 @ 11:50)    Hemoglobin: 7.0 g/dL (12-03 @ 06:05)  Hemoglobin: 7.1 g/dL (12-02 @ 11:50)  Hemoglobin: 7.3 g/dL (12-01 @ 05:55)  Hemoglobin: 6.9 g/dL (11-30 @ 12:50)    Creatinine, Serum 0.78 (12-03 @ 06:05)  Creatinine, Serum 1.10 (12-02 @ 11:50)  Creatinine, Serum 1.00 (12-01 @ 05:55)        LIVER FUNCTIONS - ( 02 Dec 2024 11:50 )  Alb: 2.1 g/dL / Pro: 6.7 g/dL / ALK PHOS: 118 U/L / ALT: 55 U/L / AST: 56 U/L / GGT: x           Urinalysis Basic - ( 03 Dec 2024 06:05 )    Color: x / Appearance: x / SG: x / pH: x  Gluc: 196 mg/dL / Ketone: x  / Bili: x / Urobili: x   Blood: x / Protein: x / Nitrite: x   Leuk Esterase: x / RBC: x / WBC x   Sq Epi: x / Non Sq Epi: x / Bacteria: x

## 2024-12-04 ENCOUNTER — TRANSCRIPTION ENCOUNTER (OUTPATIENT)
Age: 69
End: 2024-12-04

## 2024-12-04 LAB
% ALBUMIN: 41.6 % — SIGNIFICANT CHANGE UP
% ALBUMIN: 42 % — SIGNIFICANT CHANGE UP
% ALPHA 1: 8.5 % — SIGNIFICANT CHANGE UP
% ALPHA 1: 8.9 % — SIGNIFICANT CHANGE UP
% ALPHA 2: 12.5 % — SIGNIFICANT CHANGE UP
% ALPHA 2: 12.9 % — SIGNIFICANT CHANGE UP
% BETA: 13.3 % — SIGNIFICANT CHANGE UP
% BETA: 13.4 % — SIGNIFICANT CHANGE UP
% GAMMA: 23.2 % — SIGNIFICANT CHANGE UP
% GAMMA: 23.7 % — SIGNIFICANT CHANGE UP
% M SPIKE: SIGNIFICANT CHANGE UP
% M SPIKE: SIGNIFICANT CHANGE UP
ACE SERPL-CCNC: <5 U/L — LOW (ref 14–82)
ALBUMIN SERPL ELPH-MCNC: 2.6 G/DL — LOW (ref 3.6–5.5)
ALBUMIN SERPL ELPH-MCNC: 2.9 G/DL — LOW (ref 3.6–5.5)
ALBUMIN/GLOB SERPL ELPH: 0.7 RATIO — SIGNIFICANT CHANGE UP
ALBUMIN/GLOB SERPL ELPH: 0.7 RATIO — SIGNIFICANT CHANGE UP
ALPHA1 GLOB SERPL ELPH-MCNC: 0.5 G/DL — HIGH (ref 0.1–0.4)
ALPHA1 GLOB SERPL ELPH-MCNC: 0.6 G/DL — HIGH (ref 0.1–0.4)
ALPHA2 GLOB SERPL ELPH-MCNC: 0.8 G/DL — SIGNIFICANT CHANGE UP (ref 0.5–1)
ALPHA2 GLOB SERPL ELPH-MCNC: 0.9 G/DL — SIGNIFICANT CHANGE UP (ref 0.5–1)
ANION GAP SERPL CALC-SCNC: 7 MMOL/L — SIGNIFICANT CHANGE UP (ref 5–17)
B-GLOBULIN SERPL ELPH-MCNC: 0.8 G/DL — SIGNIFICANT CHANGE UP (ref 0.5–1)
B-GLOBULIN SERPL ELPH-MCNC: 0.9 G/DL — SIGNIFICANT CHANGE UP (ref 0.5–1)
BLD GP AB SCN SERPL QL: SIGNIFICANT CHANGE UP
BUN SERPL-MCNC: 20 MG/DL — SIGNIFICANT CHANGE UP (ref 7–23)
CALCIUM SERPL-MCNC: 10.6 MG/DL — HIGH (ref 8.5–10.1)
CHLORIDE SERPL-SCNC: 103 MMOL/L — SIGNIFICANT CHANGE UP (ref 96–108)
CO2 SERPL-SCNC: 27 MMOL/L — SIGNIFICANT CHANGE UP (ref 22–31)
CREAT SERPL-MCNC: 0.9 MG/DL — SIGNIFICANT CHANGE UP (ref 0.5–1.3)
EGFR: 94 ML/MIN/1.73M2 — SIGNIFICANT CHANGE UP
GAMMA GLOBULIN: 1.4 G/DL — SIGNIFICANT CHANGE UP (ref 0.6–1.6)
GAMMA GLOBULIN: 1.6 G/DL — SIGNIFICANT CHANGE UP (ref 0.6–1.6)
GLUCOSE BLDC GLUCOMTR-MCNC: 265 MG/DL — HIGH (ref 70–99)
GLUCOSE BLDC GLUCOMTR-MCNC: 272 MG/DL — HIGH (ref 70–99)
GLUCOSE BLDC GLUCOMTR-MCNC: 295 MG/DL — HIGH (ref 70–99)
GLUCOSE BLDC GLUCOMTR-MCNC: 314 MG/DL — HIGH (ref 70–99)
GLUCOSE SERPL-MCNC: 318 MG/DL — HIGH (ref 70–99)
HCT VFR BLD CALC: 19.4 % — CRITICAL LOW (ref 39–50)
HCT VFR BLD CALC: 20.7 % — CRITICAL LOW (ref 39–50)
HCT VFR BLD CALC: 21.1 % — LOW (ref 39–50)
HGB BLD-MCNC: 6.3 G/DL — CRITICAL LOW (ref 13–17)
HGB BLD-MCNC: 6.6 G/DL — CRITICAL LOW (ref 13–17)
HGB BLD-MCNC: 6.7 G/DL — CRITICAL LOW (ref 13–17)
M-SPIKE: SIGNIFICANT CHANGE UP (ref 0–0)
M-SPIKE: SIGNIFICANT CHANGE UP (ref 0–0)
MCHC RBC-ENTMCNC: 28.9 PG — SIGNIFICANT CHANGE UP (ref 27–34)
MCHC RBC-ENTMCNC: 32.5 G/DL — SIGNIFICANT CHANGE UP (ref 32–36)
MCV RBC AUTO: 89 FL — SIGNIFICANT CHANGE UP (ref 80–100)
NRBC # BLD: 0 /100 WBCS — SIGNIFICANT CHANGE UP (ref 0–0)
PLATELET # BLD AUTO: 224 K/UL — SIGNIFICANT CHANGE UP (ref 150–400)
POTASSIUM SERPL-MCNC: 4.2 MMOL/L — SIGNIFICANT CHANGE UP (ref 3.5–5.3)
POTASSIUM SERPL-SCNC: 4.2 MMOL/L — SIGNIFICANT CHANGE UP (ref 3.5–5.3)
PROT PATTERN SERPL ELPH-IMP: SIGNIFICANT CHANGE UP
PROT PATTERN SERPL ELPH-IMP: SIGNIFICANT CHANGE UP
PROT SERPL-MCNC: 6.1 G/DL — SIGNIFICANT CHANGE UP (ref 6–8.3)
PROT SERPL-MCNC: 6.9 G/DL — SIGNIFICANT CHANGE UP (ref 6–8.3)
RBC # BLD: 2.18 M/UL — LOW (ref 4.2–5.8)
RBC # FLD: 20.4 % — HIGH (ref 10.3–14.5)
SODIUM SERPL-SCNC: 137 MMOL/L — SIGNIFICANT CHANGE UP (ref 135–145)
WBC # BLD: 4.17 K/UL — SIGNIFICANT CHANGE UP (ref 3.8–10.5)
WBC # FLD AUTO: 4.17 K/UL — SIGNIFICANT CHANGE UP (ref 3.8–10.5)

## 2024-12-04 PROCEDURE — 99232 SBSQ HOSP IP/OBS MODERATE 35: CPT

## 2024-12-04 PROCEDURE — 99233 SBSQ HOSP IP/OBS HIGH 50: CPT | Mod: GC

## 2024-12-04 RX ORDER — CALCITONIN SALMON 200 [IU]/ML
310 INJECTION, SOLUTION INTRAMUSCULAR; SUBCUTANEOUS EVERY 12 HOURS
Refills: 0 | Status: COMPLETED | OUTPATIENT
Start: 2024-12-04 | End: 2024-12-06

## 2024-12-04 RX ORDER — INSULIN GLARGINE 100 [IU]/ML
20 INJECTION, SOLUTION SUBCUTANEOUS AT BEDTIME
Refills: 0 | Status: DISCONTINUED | OUTPATIENT
Start: 2024-12-04 | End: 2024-12-09

## 2024-12-04 RX ADMIN — TACROLIMUS 1 MILLIGRAM(S): 5 CAPSULE ORAL at 21:28

## 2024-12-04 RX ADMIN — Medication 325 MILLIGRAM(S): at 05:28

## 2024-12-04 RX ADMIN — HYDRALAZINE HYDROCHLORIDE 100 MILLIGRAM(S): 10 TABLET ORAL at 21:28

## 2024-12-04 RX ADMIN — AZATHIOPRINE 50 MILLIGRAM(S): 100 TABLET ORAL at 18:43

## 2024-12-04 RX ADMIN — AZATHIOPRINE 50 MILLIGRAM(S): 100 TABLET ORAL at 06:37

## 2024-12-04 RX ADMIN — ESCITALOPRAM OXALATE 10 MILLIGRAM(S): 10 TABLET, FILM COATED ORAL at 12:10

## 2024-12-04 RX ADMIN — Medication 15 MILLILITER(S): at 12:10

## 2024-12-04 RX ADMIN — ESCITALOPRAM OXALATE 10 MILLIGRAM(S): 10 TABLET, FILM COATED ORAL at 20:03

## 2024-12-04 RX ADMIN — Medication 6: at 12:12

## 2024-12-04 RX ADMIN — HYDRALAZINE HYDROCHLORIDE 100 MILLIGRAM(S): 10 TABLET ORAL at 05:26

## 2024-12-04 RX ADMIN — Medication 2 TABLET(S): at 21:28

## 2024-12-04 RX ADMIN — POLYETHYLENE GLYCOL 3350 17 GRAM(S): 17 POWDER, FOR SOLUTION ORAL at 12:10

## 2024-12-04 RX ADMIN — ERTAPENEM 120 MILLIGRAM(S): 1 INJECTION, POWDER, LYOPHILIZED, FOR SOLUTION INTRAMUSCULAR; INTRAVENOUS at 06:36

## 2024-12-04 RX ADMIN — Medication 6: at 17:14

## 2024-12-04 RX ADMIN — CALCITONIN SALMON 310 INTERNATIONAL UNIT(S): 200 INJECTION, SOLUTION INTRAMUSCULAR; SUBCUTANEOUS at 18:54

## 2024-12-04 RX ADMIN — HYDRALAZINE HYDROCHLORIDE 100 MILLIGRAM(S): 10 TABLET ORAL at 15:45

## 2024-12-04 RX ADMIN — AMLODIPINE BESYLATE 10 MILLIGRAM(S): 10 TABLET ORAL at 05:29

## 2024-12-04 RX ADMIN — Medication 300 MILLIGRAM(S): at 12:10

## 2024-12-04 RX ADMIN — CARVEDILOL 12.5 MILLIGRAM(S): 25 TABLET, FILM COATED ORAL at 05:28

## 2024-12-04 RX ADMIN — LISINOPRIL 20 MILLIGRAM(S): 20 TABLET ORAL at 05:25

## 2024-12-04 RX ADMIN — TACROLIMUS 2 MILLIGRAM(S): 5 CAPSULE ORAL at 12:11

## 2024-12-04 RX ADMIN — Medication 50 MILLIGRAM(S): at 12:11

## 2024-12-04 RX ADMIN — Medication 500 MILLIGRAM(S): at 18:43

## 2024-12-04 RX ADMIN — Medication 50 MILLILITER(S): at 06:58

## 2024-12-04 RX ADMIN — Medication 81 MILLIGRAM(S): at 12:10

## 2024-12-04 RX ADMIN — Medication 88 MICROGRAM(S): at 05:26

## 2024-12-04 RX ADMIN — Medication 8: at 08:26

## 2024-12-04 RX ADMIN — Medication 500 MILLIGRAM(S): at 05:27

## 2024-12-04 RX ADMIN — ESCITALOPRAM OXALATE 10 MILLIGRAM(S): 10 TABLET, FILM COATED ORAL at 06:58

## 2024-12-04 RX ADMIN — ROSUVASTATIN CALCIUM 10 MILLIGRAM(S): 5 TABLET, FILM COATED ORAL at 21:28

## 2024-12-04 RX ADMIN — INSULIN GLARGINE 20 UNIT(S): 100 INJECTION, SOLUTION SUBCUTANEOUS at 21:28

## 2024-12-04 RX ADMIN — Medication 325 MILLIGRAM(S): at 20:03

## 2024-12-04 RX ADMIN — CARVEDILOL 12.5 MILLIGRAM(S): 25 TABLET, FILM COATED ORAL at 18:43

## 2024-12-04 NOTE — PROGRESS NOTE ADULT - ASSESSMENT
Patient is a 70yo M, PMH DM, HTN, HLD, h/o kidney transplant, hypothyroidism, presents to ED from [facility] for AMS likely  acute  metabolic encephalopathy       AMS 2/2 Sepsis 2/2 multiple infections (UTI, sacral infection, possible osteomyelitis), E coli bacteremia  cause of acute  metabolic encephalopathy   Sepsis 2/2 ESBL Ecoli UTI and bacteremia. sensitive to ertapenem.  - C/W  Meropenum 1gm q24h x10 day course until 12/10 d/w  ID Dr. Saul with  midline   -Tylenol PRN pain and fever- diet as tolerated   - aspiration and fall risk - Continue Senna, Miralax, PRN dulcolax suppositories   - MR pelvis/sacrum to eval for osteomyelitis- last    Again seen are comminuted bilateral sacral lona fractures with marked   osseous edema and marked loss of normal T1 fatty marrow. Osseous edema   with loss of normal T1 fatty marrow at the caudal aspect of the left   posterior iliac bone adjacent to the sacroiliac joint. Previously seen   fracture component is better visualized on CT. These findings could be   secondary to extensive fracture deformities in an osteopenic patient   versus osteomyelitis if there was a history of a soft tissue ulcer   extending to bone versus metastatic disease given the marked loss of T1   fatty marrow if there is a history of malignancy. Clinical correlation   with patient history is advised.  - Per Ortho no surgical intervention for fracture of sacrum      Acute on Chronic anemia :  - Likely due to infection,  and CKD- No signs of bleeding noted    - Iron22/ sat low    iron deficiency with chr anemia  , fu occult blood  - no bm   -- D/w Hem Dr. Sal  With prior renal transplant would use irradiated PRBC and try to keep transfusions to a minimum.     s/p   1 unit prbc - hb stable   Hepatic lesion  CT abd with 5.5cm lesion on R hepatic lobe  wife known mass it benin     Diabetes Mellitus  Diet  Pre meal Insulin   Lantus to 20 u QHS (from home dose 35u, increase as tolerated according to fingerstick).  fingerstick glucose monitoring Q6h  ISS A1c 7.4 - blood sugar stable     HTN: Uncontrolled   Continue Lisinopril 20mg  increase to 40 mg  daily with hold parameters  Continue Hydralazine 100mg TID with hold parameters  Continue Coreg, switched from Metoprolol - 12.5 mg po bid  Continue Amlodipine 10mg daily - fu bp closely     HLD  Continue Crestor 10mg daily    s/p Kidney transplant/CKD 3  Low mag and PO4, replaced  Continue Tacrolimus 2mg daily  Continue Tacrolimus 1mg QHS  Continue Azathioprine 50mg BID  Nephrology consulted dr vizcarra     Hypothyroidism  Continue Levothyroxine 88mcg QAM   hypercalcemia   Depression  Continue Escitalopram 10mg TID  Continue Bupropion 300mg daily    liver mass and spleen mass as per radiologist     DVT ppx: Hold AC due to anemia and risk of bleeding       sept 27 / 24  aware  about liver mass / spleen  mass was diagnosed at [facility] -  liver   biopsy was  done was benign    wife  bedside  aware tt/plan   will bring report.       Dispo: DC  dolores  24 hr to 48hr    Patient is a 70yo M, PMH DM, HTN, HLD, h/o kidney transplant, hypothyroidism, presents to ED from [facility] for AMS likely  acute  metabolic encephalopathy       AMS 2/2 Sepsis 2/2 multiple infections (UTI, sacral infection, possible osteomyelitis), E coli bacteremia  cause of acute  metabolic encephalopathy   Sepsis 2/2 ESBL Ecoli UTI and bacteremia. sensitive to ertapenem.  - C/W  Meropenum 1gm q24h x10 day course until 12/10 d/w  ID Dr. Saul with  midline   -Tylenol PRN pain and fever- diet as tolerated   - aspiration and fall risk - Continue Senna, Miralax, PRN dulcolax suppositories   - MR pelvis/sacrum to eval for osteomyelitis- last    Again seen are comminuted bilateral sacral lona fractures with marked   osseous edema and marked loss of normal T1 fatty marrow. Osseous edema   with loss of normal T1 fatty marrow at the caudal aspect of the left   posterior iliac bone adjacent to the sacroiliac joint. Previously seen   fracture component is better visualized on CT. These findings could be   secondary to extensive fracture deformities in an osteopenic patient   versus osteomyelitis if there was a history of a soft tissue ulcer   extending to bone versus metastatic disease given the marked loss of T1   fatty marrow if there is a history of malignancy. Clinical correlation   with patient history is advised.  - Per Ortho no surgical intervention for fracture of sacrum      Acute on Chronic anemia :  - Likely due to infection,  and CKD- No signs of bleeding noted    - Iron22/ sat low    iron deficiency with chr anemia  , fu occult blood  - no bm   -- D/w Hem Dr. Sal  With prior renal transplant would use irradiated PRBC and try to keep transfusions to a minimum.     s/p   1 unit prbc - hb stable   Hepatic lesion  CT abd with 5.5cm lesion on R hepatic lobe  wife known mass it benin     Diabetes Mellitus  Diet  Pre meal Insulin   Lantus to 20 u QHS (from home dose 35u, increase as tolerated according to fingerstick).  fingerstick glucose monitoring Q6h  ISS A1c 7.4 - blood sugar stable     HTN: Uncontrolled   Continue Lisinopril 20mg  increase to 40 mg  daily with hold parameters  Continue Hydralazine 100mg TID with hold parameters  Continue Coreg, switched from Metoprolol - 12.5 mg po bid  Continue Amlodipine 10mg daily - fu bp closely     HLD  Continue Crestor 10mg daily    s/p Kidney transplant/CKD 3  Low mag and PO4, replaced  Continue Tacrolimus 2mg daily  Continue Tacrolimus 1mg QHS  Continue Azathioprine 50mg BID  Nephrology consulted dr vizcarra     Hypothyroidism  Continue Levothyroxine 88mcg QAM   hypercalcemia was possible  sec to vit D   on calcitonin   Depression  Continue Escitalopram 10mg TID  Continue Bupropion 300mg daily    liver mass and spleen mass as per radiologist     DVT ppx: Hold AC due to anemia and risk of bleeding       sept 27 / 24  aware  about liver mass / spleen  mass was diagnosed at [facility] -  liver   biopsy was  done was benign    wife  bedside  aware tt/plan   will bring report.       Dispo: DC  dolores  24 hr to 48hr    Patient is a 68yo M, PMH DM, HTN, HLD, h/o kidney transplant, hypothyroidism, presents to ED from [facility] for AMS likely  acute  metabolic encephalopathy       AMS 2/2 Sepsis 2/2 multiple infections (UTI, sacral infection, possible osteomyelitis), E coli bacteremia  cause of acute  metabolic encephalopathy   Sepsis 2/2 ESBL Ecoli UTI and bacteremia. sensitive to ertapenem.  - C/W  Meropenum 1gm q24h x10 day course until 12/10 d/w  ID Dr. Saul with  midline   -Tylenol PRN pain and fever- diet as tolerated   - aspiration and fall risk - Continue Senna, Miralax, PRN dulcolax suppositories   - MR pelvis/sacrum to eval for osteomyelitis- last    Again seen are comminuted bilateral sacral lona fractures with marked   osseous edema and marked loss of normal T1 fatty marrow. Osseous edema   with loss of normal T1 fatty marrow at the caudal aspect of the left   posterior iliac bone adjacent to the sacroiliac joint. Previously seen   fracture component is better visualized on CT. These findings could be   secondary to extensive fracture deformities in an osteopenic patient   versus osteomyelitis if there was a history of a soft tissue ulcer   extending to bone versus metastatic disease given the marked loss of T1   fatty marrow if there is a history of malignancy. Clinical correlation   with patient history is advised.  - Per Ortho no surgical intervention for fracture of sacrum      Acute on Chronic anemia :  - Likely due to infection,  and CKD- No signs of bleeding noted    - Iron22/ sat low    iron deficiency with chr anemia  , fu occult blood  - no bm   -- D/w Hem Dr. Sal  With prior renal transplant would use irradiated PRBC and try to keep transfusions to a minimum.     s/p   1 unit prbc - hb stable   Hepatic lesion  CT abd with 5.5cm lesion on R hepatic lobe  wife known mass it benin     Diabetes Mellitus  Diet  Pre meal Insulin   Lantus to 20 u QHS (from home dose 35u, increase as tolerated according to fingerstick).  fingerstick glucose monitoring Q6h  ISS A1c 7.4 - blood sugar stable     HTN: Uncontrolled   Continue Lisinopril 20mg  increase to 40 mg  daily with hold parameters  Continue Hydralazine 100mg TID with hold parameters  Continue Coreg, switched from Metoprolol - 12.5 mg po bid  Continue Amlodipine 10mg daily - fu bp closely     HLD  Continue Crestor 10mg daily    s/p Kidney transplant/CKD 3  Low mag and PO4, replaced  Continue Tacrolimus 2mg daily  Continue Tacrolimus 1mg QHS  Continue Azathioprine 50mg BID  Nephrology consulted dr vizcarra     Hypothyroidism  Continue Levothyroxine 88mcg QAM   hypercalcemia was possible  sec to vit D   on calcitonin - fu ca   Depression  Continue Escitalopram 10mg TID  Continue Bupropion 300mg daily    liver mass and spleen mass as per radiologist     DVT ppx: Hold AC due to anemia and risk of bleeding       sept 27 / 24  aware  about liver mass / spleen  mass was diagnosed at [facility] -  liver   biopsy was  done was benign    wife  bedside  aware tt/plan   will bring report.       Dispo: DC  dolores  24 hr to 48hr

## 2024-12-04 NOTE — PROGRESS NOTE ADULT - SUBJECTIVE AND OBJECTIVE BOX
[facility], Division of Infectious Diseases  WANG Mccoy Y. Patel, S. Shah, G. [facility]  [phone]    Name: JAN CALVIN  Age: 69y  Gender: Male  MRN: [id number]    Interval History:  Patient seen and examined at bedside  No acute overnight events. Afebrile  No complaints  Notes reviewed    Antibiotics:  ertapenem  IVPB      ertapenem  IVPB 1000 milliGRAM(s) IV Intermittent every 24 hours      Medications:  acetaminophen   IVPB .. 1000 milliGRAM(s) IV Intermittent every 8 hours PRN  aluminum hydroxide/magnesium hydroxide/simethicone Suspension 30 milliLiter(s) Oral every 4 hours PRN  amLODIPine   Tablet 10 milliGRAM(s) Oral daily  ascorbic acid 500 milliGRAM(s) Oral two times a day  aspirin enteric coated 81 milliGRAM(s) Oral daily  azaTHIOprine 50 milliGRAM(s) Oral two times a day  buPROPion XL (24-Hour) . 300 milliGRAM(s) Oral daily  calcitonin Injectable 310 International Unit(s) IntraMuscular every 12 hours  carvedilol 12.5 milliGRAM(s) Oral every 12 hours  dextrose 5%. 1000 milliLiter(s) IV Continuous <Continuous>  dextrose 5%. 1000 milliLiter(s) IV Continuous <Continuous>  dextrose 50% Injectable 25 Gram(s) IV Push once  dextrose 50% Injectable 12.5 Gram(s) IV Push once  dextrose 50% Injectable 25 Gram(s) IV Push once  dextrose Oral Gel 15 Gram(s) Oral once PRN  ertapenem  IVPB      ertapenem  IVPB 1000 milliGRAM(s) IV Intermittent every 24 hours  escitalopram 10 milliGRAM(s) Oral <User Schedule>  ferrous    sulfate 325 milliGRAM(s) Oral two times a day  glucagon  Injectable 1 milliGRAM(s) IntraMuscular once  hydrALAZINE 100 milliGRAM(s) Oral three times a day  hydrALAZINE Injectable 10 milliGRAM(s) IV Push every 8 hours PRN  insulin glargine Injectable (LANTUS) 17 Unit(s) SubCutaneous at bedtime  insulin lispro (ADMELOG) corrective regimen sliding scale   SubCutaneous three times a day before meals  levothyroxine 88 MICROGram(s) Oral <User Schedule>  lisinopril 20 milliGRAM(s) Oral daily  melatonin 3 milliGRAM(s) Oral at bedtime PRN  metoprolol tartrate Injectable 5 milliGRAM(s) IV Push every 6 hours PRN  mineral oil enema 133 milliLiter(s) Rectal once  multivitamin/minerals/iron Oral Solution (CENTRUM) 15 milliLiter(s) Oral daily  ondansetron Injectable 4 milliGRAM(s) IV Push every 8 hours PRN  polyethylene glycol 3350 17 Gram(s) Oral daily  pyridoxine 50 milliGRAM(s) Oral daily  rosuvastatin 10 milliGRAM(s) Oral at bedtime  senna 2 Tablet(s) Oral at bedtime  sodium chloride 0.45%. 1000 milliLiter(s) IV Continuous <Continuous>  tacrolimus 2 milliGRAM(s) Oral daily  tacrolimus 1 milliGRAM(s) Oral at bedtime      Review of Systems:  unable to obtain    Allergies: No Known Allergies    For details regarding the patient's past medical history, social history, family history, and other miscellaneous elements, please refer the initial infectious diseases consultation and/or the admitting history and physical examination for this admission.    Objective:  Vitals:   T(C): 36.6 (12-04-24 @ 11:39), Max: 37.3 (12-03-24 @ 17:59)  HR: 72 (12-04-24 @ 11:39) (72 - 79)  BP: 144/67 (12-04-24 @ 11:39) (144/67 - 161/68)  RR: 20 (12-04-24 @ 11:39) (18 - 20)  SpO2: 95% (12-04-24 @ 11:39) (91% - 95%)    Physical Examination:  General: no acute distress  HEENT: NC/AT, EOMI,   Cardio: RRR  Resp: decreased breath sounds  Abd: soft, NT, ND  Ext: no edema or cyanosis  Skin: warm, dry, no visible rash      Laboratory Studies:  CBC:                       6.7    x     )-----------( x        ( 04 Dec 2024 10:46 )             21.1     CMP: 12-04    137  |  103  |  20  ----------------------------<  318[H]  4.2   |  27  |  0.90    Ca    10.6[H]      04 Dec 2024 09:16  Phos  2.9     12-03  Mg     1.4     12-03        Urinalysis Basic - ( 04 Dec 2024 09:16 )    Color: x / Appearance: x / SG: x / pH: x  Gluc: 318 mg/dL / Ketone: x  / Bili: x / Urobili: x   Blood: x / Protein: x / Nitrite: x   Leuk Esterase: x / RBC: x / WBC x   Sq Epi: x / Non Sq Epi: x / Bacteria: x        Microbiology: reviewed    Culture - Blood (collected 11-30-24 @ 07:05)  Source: .Blood BLOOD  Preliminary Report (12-04-24 @ 13:01):    No growth at 4 days    Culture - Blood (collected 11-30-24 @ 07:00)  Source: .Blood BLOOD  Preliminary Report (12-04-24 @ 13:01):    No growth at 4 days    Culture - Urine (collected 11-29-24 @ 11:00)  Source: Catheterized  Final Report (12-01-24 @ 10:17):    50,000 - 99,000 CFU/mL Escherichia coli ESBL  Organism: Escherichia coli ESBL (12-01-24 @ 10:17)  Organism: Escherichia coli ESBL (12-01-24 @ 10:17)      Method Type: CHRIST      -  Ampicillin: R >16 These ampicillin results predict results for amoxicillin      -  Ampicillin/Sulbactam: I 16/8      -  Aztreonam: R >16      -  Cefazolin: R >16 For uncomplicated UTI with K. pneumoniae, E. coli, or P. mirablis: CHRIST <=16 is sensitive and CHRIST >=32 is resistant. This also predicts results for oral agents cefaclor, cefdinir, cefpodoxime, cefprozil, cefuroxime axetil, cephalexin and locarbef for uncomplicated UTI. Note that some isolates may be susceptible to these agents while testing resistant to cefazolin.      -  Cefepime: R >16      -  Ceftriaxone: R >32      -  Cefuroxime: R >16      -  Ciprofloxacin: R >2      -  Ertapenem: S <=0.5      -  Gentamicin: R >8      -  Imipenem: S <=1      -  Levofloxacin: R 4      -  Meropenem: S <=1      -  Nitrofurantoin: S <=32 Should not be used to treat pyelonephritis      -  Piperacillin/Tazobactam: S <=8      -  Tobramycin: R >8      -  Trimethoprim/Sulfamethoxazole: R >2/38    Urinalysis with Rflx Culture (collected 11-29-24 @ 11:00)    Culture - Blood (collected 11-29-24 @ 07:15)  Source: .Blood BLOOD  Gram Stain (11-29-24 @ 21:31):    Growth in aerobic bottle: Gram Negative Rods    Growth in anaerobic bottle: Gram Negative Rods  Final Report (12-01-24 @ 17:40):    Growth in aerobic and anaerobic bottles: Escherichia coli ESBL  Organism: Escherichia coli ESBL (12-01-24 @ 17:40)  Organism: Escherichia coli ESBL (12-01-24 @ 17:40)      Method Type: CHRIST      -  Ampicillin: R >16 These ampicillin results predict results for amoxicillin      -  Ampicillin/Sulbactam: R >16/8      -  Aztreonam: R >16      -  Cefazolin: R >16      -  Cefepime: R >16      -  Ceftriaxone: R >32      -  Ciprofloxacin: R >2      -  Ertapenem: S <=0.5      -  Gentamicin: R >8      -  Imipenem: S <=1      -  Levofloxacin: R 4      -  Meropenem: S <=1      -  Piperacillin/Tazobactam: R <=8      -  Tobramycin: R >8      -  Trimethoprim/Sulfamethoxazole: R >2/38    Culture - Blood (collected 11-29-24 @ 07:10)  Source: .Blood BLOOD  Gram Stain (11-29-24 @ 22:18):    Growth in anaerobic bottle: Gram Negative Rods    Growth in aerobic bottle: Gram Negative Rods  Final Report (12-01-24 @ 17:41):    Growth in aerobic and anaerobic bottles: Escherichia coli ESBL    Direct identification is available within approximately 3-5    hours either by Blood Panel Multiplexed PCR or Direct    MALDI-TOF. Details: [email or web address]  Organism: Blood Culture PCR (12-01-24 @ 17:41)  Organism: Blood Culture PCR (12-01-24 @ 17:41)      Method Type: PCR      -  Escherichia coli: Detec      -  ESBL: Detec      -  CTX-M Resistance Marker: Detec          Radiology: reviewed    < from: US Abdomen Upper Quadrant Right (12.03.24 @ 16:29) >    ACC: [id number] EXAM:  US ABDOMEN RT UPR QUADRANT   ORDERED BY: AGIL TOBAR     PROCEDURE DATE:  12/03/2024          INTERPRETATION:  CLINICAL INFORMATION: Liver lesion on MRI    COMPARISON: MRI abdomen 12/2/2024    TECHNIQUE: Sonography of the right upper quadrant.    FINDINGS:  Liver: 5.5 cm hypoechoic lesion in the right lobe of the liver with   internal blood flow compatible with solid mass.  Bile ducts: Normal caliber. Common bile duct measures 6 mm.  Gallbladder: Cholecystectomy.  Pancreas: Obscured  Right iliac fossa kidney transplant: 14.5 cm. No hydronephrosis. Mild   collecting system fullness.  Ascites: None.  IVC: Visualized portions are within normal limits.    IMPRESSION:  5.5 cm hypoechoic lesion in the right lobe of the liver with internal   blood flow compatible with solid mass. Primary or metastatic disease is   considered. Benign lesions can also similar appearance. Follow-up   complete MRI with and without contrast is recommended.      --- End of Report ---            LAWSONCARLA INGRAM MD; Attending Radiologist  This document has been electronically signed. Dec  3 2024 10:34PM    < end of copied text >  < from: MR Pelvis Bony Only No Cont (12.03.24 @ 15:36) >    ACC: [id number] EXAM:  MR PELVIS BONY ONLY   ORDERED BY: GAIL TOBAR     PROCEDURE DATE:  12/03/2024          INTERPRETATION:  Exam Type: MR PELVIS BONY  Exam Date and Time: 12/3/2024 3:36 PM  Indication: Concern for osteomyelitis  Comparison: Pelvis CT on 11/29/2024.    TECHNIQUE:  Multiplanar multisequence MRI of the musculoskeletal pelvis was performed   using a standard non-contrast protocol.    FINDINGS:    Again seen are comminuted bilateral sacral lona fractures with marked   osseous edema and marked loss of normal T1 fatty marrow. Osseous edema   with loss of normal T1 fatty marrow at the caudal aspect of the left   posterior iliac bone adjacent to the sacroiliac joint. Previously seen   fracture component is better visualized on CT.    Bones are diffusely osteopenic.    There is a partially imaged left hip hemiarthroplasty with adjacent   marked streak artifact. There is mild right hip osteoarthrosis. Mild   osteoarthritis of the pubic symphysis. Mild degenerative changes of the  lumbar spine.    Again seen is soft tissue density in the retroperitoneum around the   aortoiliac bifurcation is consistent with known retroperitoneal fibrosis.   There is a transplanted kidney in the right iliac fossa. Again seen is   thickening of the rectum consistent with stercoral colitis.    IMPRESSION:  Again seen are comminuted bilateral sacral lona fractures with marked   osseous edema and marked loss of normal T1 fatty marrow. Osseous edema   with loss of normal T1 fatty marrow at thecaudal aspect of the left   posterior iliac bone adjacent to the sacroiliac joint. Previously seen   fracture component is better visualized on CT. These findings could be   secondary to extensive fracture deformities in an osteopenic patient   versus osteomyelitis if there was a history of a soft tissue ulcer   extending to bone versus metastatic disease given the marked loss of T1   fatty marrow if there is a history of malignancy. Clinical correlation   with patient history is advised.    Findings were discussed with Dr. Tobar by Dr. Mendoza at 4:52 PM on   12/3/2024.    --- End of Report ---            THELMA MENDOZA DO; Attending Radiologist  This document has been electronically signed. Dec  3 2024  4:54PM    < end of copied text >

## 2024-12-04 NOTE — PROGRESS NOTE ADULT - ASSESSMENT
Patient is a 70yo M, PMH DM, HTN, HLD, h/o kidney transplant, hypothyroidism, presents to ED from [facility] for AMS. Patient is lethargic and unable to provide history at this time.    ESBL Bacteremia 2/2 Acute Cystitis  Sacral Wound/OM  Liver Mass w/ mets  Fevers  AMS 2/2 above  Febrile in the .5  UA positive for UTI  Flu/COVID/RSV negative  11/29 BCx ESBL E.coli , repeat negative  11/29 UCx   50,000 - 99,000 CFU/mL Escherichia coli    CT CAP w/ Small left pleural effusion with small loculated components  Right pelvic transplant kidney with mild fullness of the pelvic alyceal system. Stercoral proctitis.  Patchy sclerosis and osteolysis throughout the bilateral sacrum with pathologic fractures. There is soft tissue with stranding extending throughout the presacral and posterior extraperitoneal pelvic soft tissues.  There are a few bubbles of air/gas at the right sacral pathologic fracture, findings suggestive of infection/osteomyelitis.    MRI Again seen are comminuted bilateral sacral lona fractures with marked osseous edema and marked loss of normal T1 fatty marrow. Osseous edema with loss of normal T1 fatty marrow at thecaudal aspect of the left   posterior iliac bone adjacent to the sacroiliac joint. Previously seen fracture component is better visualized on CT. These findings could be secondary to extensive fracture deformities in an osteopenic patient   versus osteomyelitis if there was a history of a soft tissue ulcer extending to bone versus metastatic disease given the marked loss of T1 fatty marrow if there is a history of malignancy. Clinical correlation   with patient history is advised.    Reviewed w/ admitting attending, no sacral wound present. Suspect findings on sacral more related to mets from possible malignancy    Recommendations:   C/w ertapenem 1gm q24h x10 day course until 12/10  Trend temps/WBC  Monitor Hgb, transfusion prn protocol  Surgery following  Additional care per primary team    D/w Dr. Upton  Infectious Diseases will follow. Please call with any questions.  Vanessa Saul M.D.  [facility] Division of Infectious Diseases [phone]  For after 5 P.M. and weekends, please call [phone]  Available on [employer] TEAMS

## 2024-12-04 NOTE — DISCHARGE NOTE NURSING/CASE MANAGEMENT/SOCIAL WORK - NSDCVIVACCINE_GEN_ALL_CORE_FT
Td (adult) preservative free; 24-Mar-2017 19:43; Bharta Bolivar (NURA); [employer]; [id number]; IntraMuscular; Deltoid Right.; 0.5 milliLiter(s); VIS (VIS Published: 24-Mar-2017, VIS Presented: 24-Mar-2017);   Tdap; 07-Oct-2016 17:16; Bharat Bolivar (NURA); [employer]; [id number]; IntraMuscular; Deltoid Right.; 0.5 milliLiter(s); VIS (VIS Published: 09-May-2013, VIS Presented: 07-Oct-2016);

## 2024-12-04 NOTE — CONSULT NOTE ADULT - ASSESSMENT
Abnormal imaging  Anemia    CBC noted  Transfuse prn  PPI for ppx  Monitor for any overt GI bleeding  MRI non-contrast noted  Will need MR abdomen w/wo IV contrast for further evaluation  Will follow up    I reviewed the overnight course of events on the unit, re-confirming the patient history. I discussed the care with the patient.  Differential diagnosis and plan of care discussed with patient after the evaluation.  35 minutes spent on total encounter of which more than fifty percent of the encounter was spent counseling and/or coordinating care by the attending physician.

## 2024-12-04 NOTE — PROGRESS NOTE ADULT - ASSESSMENT
Patient is a 68yo M, PMH DM, HTN, HLD, h/o kidney transplant, hypothyroidism, presents to ED from [facility] for AMS likely metabolic encephalopathy       AMS 2/2 Sepsis poa  2/2 multiple infections (UTI, sacral infection, possible osteomyelitis), E coli bacteremia   Sepsis 2/2 ESBL Ecoli UTI and bacteremia. sensitive to ertapenem.  - C/W  Meropenum 1gm q24h x10 day course until 12/10 d/w  ID Dr. Saul with  midline   - MR pelvis/sacrum to eval for osteomyelitis, may need bone culture to further eval  - Per Ortho no surgical intervention for fracture of sacrum    -  m low suspicion  om as per id dr saul no further tt , no sacral wound present.   Suspect findings on sacral more related to mets from possible malignancy but per wife ru no hx malignancy.     Acute on Chronic anemia :  - Likely due to infection,  and CKD- No signs of bleeding noted   -Check Iron/ TIBC in am , Ferritin Elevated  - D/w Hem Dr. Sal and Nephro Dr. Bruce,   -  1 unit prbc  / fu repeat hb today     Hepatic lesion  CT abd with 5.5cm lesion on R hepatic lobe  - MR abdomen -5.4 cm lesion in the right hepatic lobe, incompletely characterized on   this study, new since 9/19/2023, suspicious for malignancy; differential   includes metastatic disease or a primary hepatic neoplasm; differential   also includes infection/abscess which is considered less likely; further   evaluation is recommended with ultrasound.  - wife aware      Diabetes Mellitus type 2 hyperglycemia   Diet riss   Lantus to  20  QHS (from home dose 35u, increase as tolerated according to fingerstick).  fingerstick glucose monitoring Q6h  ISS  A1c 7.4     HTN: Uncontrolled   Continue Lisinopril 20mg daily with hold parameters  Continue Hydralazine 100mg TID with hold parameters  Continue Coreg, switched from Metoprolol.  Continue Amlodipine 10mg daily    HLD  Continue Crestor 10mg daily    s/p Kidney transplant/CKD 3  Low mag and PO4, replaced  Continue Tacrolimus 2mg daily  Continue Tacrolimus 1mg QHS  Continue Azathioprine 50mg BID  Nephrology consulted     Hypothyroidism  Continue Levothyroxine 88mcg QAM    Depression  Continue Escitalopram 10mg TID  Continue Bupropion 300mg daily    DVT ppx: Hold AC due to anemia and risk of bleeding - on 1 unit prbc   wife aware  - liver and spleen mass diagnosed sep 2024 / [facility]  had biopsy done was benign  will bring report.

## 2024-12-04 NOTE — PROGRESS NOTE ADULT - SUBJECTIVE AND OBJECTIVE BOX
[facility]                                                       Dr. Bruce, Dr. Prabhakar, Dr. Cabrales, Dr. Zaragoza, Dr. Bingham, Dr. Loya                                      [facility], [facility], [address]                                                 [address] [address]                                      Ph: [phone]  Fax: [phone]                                         Ph: [phone]  Fax: [phone]      Patient is a 69y old  Male who presents with a chief complaint of AMS (01 Dec 2024 11:19)  Patient seen in follow up for CKD, h/o Kidney transplant.        PAST MEDICAL HISTORY:  Diabetes Mellitus Type II    ESRD on Dialysis    GERD (gastroesophageal reflux disease)    Depression    Hypothyroidism    Hyperlipidemia    Kidney transplanted    Hypertension    ANGELIKA (obstructive sleep apnea)    Peripheral neuropathy    Shoulder fracture      MEDICATIONS  (STANDING):  amLODIPine   Tablet 10 milliGRAM(s) Oral daily  ascorbic acid 500 milliGRAM(s) Oral two times a day  aspirin enteric coated 81 milliGRAM(s) Oral daily  azaTHIOprine 50 milliGRAM(s) Oral two times a day  buPROPion XL (24-Hour) . 300 milliGRAM(s) Oral daily  calcitonin Injectable 310 International Unit(s) IntraMuscular every 12 hours  carvedilol 12.5 milliGRAM(s) Oral every 12 hours  dextrose 5%. 1000 milliLiter(s) (100 mL/Hr) IV Continuous <Continuous>  dextrose 5%. 1000 milliLiter(s) (50 mL/Hr) IV Continuous <Continuous>  dextrose 50% Injectable 25 Gram(s) IV Push once  dextrose 50% Injectable 12.5 Gram(s) IV Push once  dextrose 50% Injectable 25 Gram(s) IV Push once  ertapenem  IVPB      ertapenem  IVPB 1000 milliGRAM(s) IV Intermittent every 24 hours  escitalopram 10 milliGRAM(s) Oral <User Schedule>  ferrous    sulfate 325 milliGRAM(s) Oral two times a day  glucagon  Injectable 1 milliGRAM(s) IntraMuscular once  hydrALAZINE 100 milliGRAM(s) Oral three times a day  insulin glargine Injectable (LANTUS) 17 Unit(s) SubCutaneous at bedtime  insulin lispro (ADMELOG) corrective regimen sliding scale   SubCutaneous three times a day before meals  levothyroxine 88 MICROGram(s) Oral <User Schedule>  lisinopril 20 milliGRAM(s) Oral daily  mineral oil enema 133 milliLiter(s) Rectal once  multivitamin/minerals/iron Oral Solution (CENTRUM) 15 milliLiter(s) Oral daily  polyethylene glycol 3350 17 Gram(s) Oral daily  pyridoxine 50 milliGRAM(s) Oral daily  rosuvastatin 10 milliGRAM(s) Oral at bedtime  senna 2 Tablet(s) Oral at bedtime  sodium chloride 0.45%. 1000 milliLiter(s) (50 mL/Hr) IV Continuous <Continuous>  tacrolimus 2 milliGRAM(s) Oral daily  tacrolimus 1 milliGRAM(s) Oral at bedtime    MEDICATIONS  (PRN):  acetaminophen   IVPB .. 1000 milliGRAM(s) IV Intermittent every 8 hours PRN Temp greater or equal to 38C (100.4F)  aluminum hydroxide/magnesium hydroxide/simethicone Suspension 30 milliLiter(s) Oral every 4 hours PRN Dyspepsia  dextrose Oral Gel 15 Gram(s) Oral once PRN Blood Glucose LESS THAN 70 milliGRAM(s)/deciliter  hydrALAZINE Injectable 10 milliGRAM(s) IV Push every 8 hours PRN SBP >180 and HR 60-70bpm  melatonin 3 milliGRAM(s) Oral at bedtime PRN Insomnia  metoprolol tartrate Injectable 5 milliGRAM(s) IV Push every 6 hours PRN SBP >170  ondansetron Injectable 4 milliGRAM(s) IV Push every 8 hours PRN Nausea and/or Vomiting    T(C): 36.6 (12-04-24 @ 11:39), Max: 37.6 (12-02-24 @ 20:33)  HR: 72 (12-04-24 @ 11:39) (72 - 79)  BP: 144/67 (12-04-24 @ 11:39) (144/67 - 179/63)  RR: 20 (12-04-24 @ 11:39)  SpO2: 95% (12-04-24 @ 11:39)  Wt(kg): --  I&O's Detail    03 Dec 2024 07:01  -  04 Dec 2024 07:00  --------------------------------------------------------  IN:  Total IN: 0 mL    OUT:    Voided (mL): 350 mL  Total OUT: 350 mL    Total NET: -350 mL                  PHYSICAL EXAM:  General: No distress  Respiratory: b/l air entry  Cardiovascular: S1 S2  Gastrointestinal: soft  Extremities:  no edema        LABORATORY:                        6.7    x     )-----------( x        ( 04 Dec 2024 10:46 )             21.1     12-04    137  |  103  |  20  ----------------------------<  318[H]  4.2   |  27  |  0.90    Ca    10.6[H]      04 Dec 2024 09:16  Phos  2.9     12-03  Mg     1.4     12-03      Sodium: 137 mmol/L (12-04 @ 09:16)  Sodium: 139 mmol/L (12-03 @ 06:05)    Potassium: 4.2 mmol/L (12-04 @ 09:16)  Potassium: 3.5 mmol/L (12-03 @ 06:05)    Hemoglobin: 6.7 g/dL (12-04 @ 10:46)  Hemoglobin: 6.3 g/dL (12-04 @ 09:16)  Hemoglobin: 7.0 g/dL (12-03 @ 06:05)  Hemoglobin: 7.1 g/dL (12-02 @ 11:50)    Creatinine, Serum 0.90 (12-04 @ 09:16)  Creatinine, Serum 0.78 (12-03 @ 06:05)  Creatinine, Serum 1.10 (12-02 @ 11:50)          Urinalysis Basic - ( 04 Dec 2024 09:16 )    Color: x / Appearance: x / SG: x / pH: x  Gluc: 318 mg/dL / Ketone: x  / Bili: x / Urobili: x   Blood: x / Protein: x / Nitrite: x   Leuk Esterase: x / RBC: x / WBC x   Sq Epi: x / Non Sq Epi: x / Bacteria: x      < from: MR Pelvis Bony Only No Cont (12.03.24 @ 15:36) >    ACC: [id number] EXAM:  MR PELVIS BONY ONLY   ORDERED BY: GAIL TOBAR     PROCEDURE DATE:  12/03/2024          INTERPRETATION:  Exam Type: MR PELVIS BONY  Exam Date and Time: 12/3/2024 3:36 PM  Indication: Concern for osteomyelitis  Comparison: Pelvis CT on 11/29/2024.    TECHNIQUE:  Multiplanar multisequence MRI of the musculoskeletal pelvis was performed   using a standard non-contrast protocol.    FINDINGS:    Again seen are comminuted bilateral sacral lona fractures with marked   osseous edema and marked loss of normal T1 fatty marrow. Osseous edema   with loss of normal T1 fatty marrow at the caudal aspect of the left   posterior iliac bone adjacent to the sacroiliac joint. Previously seen   fracture component is better visualized on CT.    Bones are diffusely osteopenic.    There is a partially imaged left hip hemiarthroplasty with adjacent   marked streak artifact. There is mild right hip osteoarthrosis. Mild   osteoarthritis of the pubic symphysis. Mild degenerative changes of the  lumbar spine.    Again seen is soft tissue density in the retroperitoneum around the   aortoiliac bifurcation is consistent with known retroperitoneal fibrosis.   There is a transplanted kidney in the right iliac fossa. Again seen is   thickening of the rectum consistent with stercoral colitis.    IMPRESSION:  Again seen are comminuted bilateral sacral lona fractures with marked   osseous edema and marked loss of normal T1 fatty marrow. Osseous edema   with loss of normal T1 fatty marrow at thecaudal aspect of the left   posterior iliac bone adjacent to the sacroiliac joint. Previously seen   fracture component is better visualized on CT. These findings could be   secondary to extensive fracture deformities in an osteopenic patient   versus osteomyelitis if there was a history of a soft tissue ulcer   extending to bone versus metastatic disease given the marked loss of T1   fatty marrow if there is a history of malignancy. Clinical correlation   with patient history is advised.    Findings were discussed with Dr. Tobar by Dr. Mendoza at 4:52 PM on   12/3/2024.    --- End of Report ---            THELMA MENDOZA DO; Attending Radiologist  This document has been electronically signed. Dec  3 2024  4:54PM    < end of copied text >

## 2024-12-04 NOTE — PROGRESS NOTE ADULT - SUBJECTIVE AND OBJECTIVE BOX
[facility] -- Tom Rahman Pannella, Patel, Savella, Goodger, Cohen: Office # [phone]    Follow Up:  HTN     Subjective/Observations: Patient seen and examined. Patient awake, alert, resting in bed. Unable to obtain meaningful information 2/2 confusion. Tolerating room air.     REVIEW OF SYSTEMS: All other review of systems are negative unless indicated above    PAST MEDICAL & SURGICAL HISTORY:  Diabetes Mellitus Type II  Insulin pump (2010)      GERD (gastroesophageal reflux disease)      Depression      Hypothyroidism      Hypothyroidism      Hyperlipidemia      Kidney transplanted  2012      Hypertension      Hypertension      ANGELIKA (obstructive sleep apnea)      Peripheral neuropathy      Shoulder fracture  Left.      History of colonoscopy      S/P kidney transplant  2012      S/P cholecystectomy      Retroperitoneal fibrosis  s/p surgery      AV fistula  Right arm      S/P right cataract extraction      S/P left cataract extraction      H/O unilateral nephrectomy  Left          MEDICATIONS  (STANDING):  amLODIPine   Tablet 10 milliGRAM(s) Oral daily  ascorbic acid 500 milliGRAM(s) Oral two times a day  aspirin enteric coated 81 milliGRAM(s) Oral daily  azaTHIOprine 50 milliGRAM(s) Oral two times a day  buPROPion XL (24-Hour) . 300 milliGRAM(s) Oral daily  carvedilol 12.5 milliGRAM(s) Oral every 12 hours  dextrose 5%. 1000 milliLiter(s) (100 mL/Hr) IV Continuous <Continuous>  dextrose 5%. 1000 milliLiter(s) (50 mL/Hr) IV Continuous <Continuous>  dextrose 50% Injectable 25 Gram(s) IV Push once  dextrose 50% Injectable 12.5 Gram(s) IV Push once  dextrose 50% Injectable 25 Gram(s) IV Push once  ertapenem  IVPB      ertapenem  IVPB 1000 milliGRAM(s) IV Intermittent every 24 hours  escitalopram 10 milliGRAM(s) Oral <User Schedule>  ferrous    sulfate 325 milliGRAM(s) Oral two times a day  glucagon  Injectable 1 milliGRAM(s) IntraMuscular once  hydrALAZINE 100 milliGRAM(s) Oral three times a day  insulin glargine Injectable (LANTUS) 17 Unit(s) SubCutaneous at bedtime  insulin lispro (ADMELOG) corrective regimen sliding scale   SubCutaneous three times a day before meals  levothyroxine 88 MICROGram(s) Oral <User Schedule>  lisinopril 20 milliGRAM(s) Oral daily  mineral oil enema 133 milliLiter(s) Rectal once  multivitamin/minerals/iron Oral Solution (CENTRUM) 15 milliLiter(s) Oral daily  polyethylene glycol 3350 17 Gram(s) Oral daily  pyridoxine 50 milliGRAM(s) Oral daily  rosuvastatin 10 milliGRAM(s) Oral at bedtime  senna 2 Tablet(s) Oral at bedtime  sodium chloride 0.45%. 1000 milliLiter(s) (50 mL/Hr) IV Continuous <Continuous>  tacrolimus 2 milliGRAM(s) Oral daily  tacrolimus 1 milliGRAM(s) Oral at bedtime    MEDICATIONS  (PRN):  acetaminophen   IVPB .. 1000 milliGRAM(s) IV Intermittent every 8 hours PRN Temp greater or equal to 38C (100.4F)  aluminum hydroxide/magnesium hydroxide/simethicone Suspension 30 milliLiter(s) Oral every 4 hours PRN Dyspepsia  dextrose Oral Gel 15 Gram(s) Oral once PRN Blood Glucose LESS THAN 70 milliGRAM(s)/deciliter  hydrALAZINE Injectable 10 milliGRAM(s) IV Push every 8 hours PRN SBP >180 and HR 60-70bpm  melatonin 3 milliGRAM(s) Oral at bedtime PRN Insomnia  metoprolol tartrate Injectable 5 milliGRAM(s) IV Push every 6 hours PRN SBP >170  ondansetron Injectable 4 milliGRAM(s) IV Push every 8 hours PRN Nausea and/or Vomiting    Allergies    No Known Allergies    Intolerances      Vital Signs Last 24 Hrs  T(C): 36.9 (04 Dec 2024 04:50), Max: 37.3 (03 Dec 2024 17:59)  T(F): 98.5 (04 Dec 2024 04:50), Max: 99.1 (03 Dec 2024 17:59)  HR: 76 (04 Dec 2024 04:50) (72 - 79)  BP: 149/72 (04 Dec 2024 04:50) (149/72 - 161/68)  BP(mean): --  RR: 18 (04 Dec 2024 04:50) (18 - 18)  SpO2: 94% (04 Dec 2024 04:50) (91% - 96%)    Parameters below as of 04 Dec 2024 04:50  Patient On (Oxygen Delivery Method): room air      I&O's Summary    03 Dec 2024 07:01  -  04 Dec 2024 07:00  --------------------------------------------------------  IN: 0 mL / OUT: 350 mL / NET: -350 mL          TELE: Not on telemetry   PHYSICAL EXAM:  Constitutional: NAD, awake and alert  HEENT: Moist Mucous Membranes, Anicteric  Pulmonary: Non-labored, breath sounds are clear bilaterally, No wheezing, rales or rhonchi  Cardiovascular: Regular, S1 and S2, No murmurs, No rubs, gallops or clicks  Gastrointestinal:  soft, nontender, nondistended   Lymph: No peripheral edema. No lymphadenopathy.   Skin: No visible rashes or ulcers.  Psych: confused       LABS: All Labs Reviewed:                        6.3    4.17  )-----------( 224      ( 04 Dec 2024 09:16 )             19.4                         7.0    3.34  )-----------( 234      ( 03 Dec 2024 06:05 )             21.4                         7.1    4.14  )-----------( 249      ( 02 Dec 2024 11:50 )             21.8     04 Dec 2024 09:16    137    |  103    |  20     ----------------------------<  318    4.2     |  27     |  0.90   03 Dec 2024 06:05    139    |  105    |  19     ----------------------------<  196    3.5     |  30     |  0.78   02 Dec 2024 11:50    137    |  104    |  24     ----------------------------<  289    3.5     |  29     |  1.10     Ca    10.6       04 Dec 2024 09:16  Ca    10.8       03 Dec 2024 06:05  Ca    11.2       02 Dec 2024 11:50  Phos  2.9       03 Dec 2024 06:05  Phos  1.9       02 Dec 2024 11:50  Mg     1.4       03 Dec 2024 06:05  Mg     1.5       02 Dec 2024 11:50    TPro  6.7    /  Alb  2.1    /  TBili  0.4    /  DBili  x      /  AST  56     /  ALT  55     /  AlkPhos  118    02 Dec 2024 11:50   LIVER FUNCTIONS - ( 02 Dec 2024 11:50 )  Alb: 2.1 g/dL / Pro: 6.7 g/dL / ALK PHOS: 118 U/L / ALT: 55 U/L / AST: 56 U/L / GGT: x             12 Lead ECG:   Ventricular Rate 81 BPM    Atrial Rate 81 BPM    P-R Interval 148 ms    QRS Duration 104 ms    Q-T Interval 406 ms    QTC Calculation(Bazett) 471 ms    P Axis 44 degrees    R Axis 12 degrees    T Axis 53 degrees    Diagnosis Line Normal sinus rhythm  Normal ECG  When compared with ECG of 29-NOV-2024 07:11,  Nonspecific T wave abnormality, improved in Lateral leads  Confirmed by Kya Gonzalez ([id number]) on 12/1/2024 10:43:18 AM (12-01-24 @ 09:28)      TRANSTHORACIC ECHOCARDIOGRAM REPORT  ________________________________________________________________________________                                      _______       Pt. Name:       JAN CALVIN Study Date:    11/29/2024  MRN:            [id number]          YOB: 1955  Accession #:    [id number]         Age:           69 years  Account#:       [id number]        Gender:        M  Heart Rate:                       Height:        64.17 in (163.00 cm)  Rhythm:       Weight:        169.75 lb (77.00 kg)  Blood Pressure: 196/81 mmHg       BSA/BMI:       1.83 m² / 28.98 kg/m²  ________________________________________________________________________________________  Referring Physician:    [id number] Ale Ibrahim  Interpreting Physician: Kya Gonzalez MD  Primary Sonographer:    Butch Salazar    CPT:               ECHO TTE WO CON COMP W DOPP - 80671.m  Indication(s):     Cardiac murmur, unspecified - R01.1  Procedure:         Transthoracic echocardiogram with 2-D, M-mode and complete                     spectral and color flow Doppler.  Ordering Location: [facility]  Admission Status:  ED    _______________________________________________________________________________________     CONCLUSIONS:      1. Left ventricular cavity is normal in size. Left ventricular systolic function is normal with an ejection fraction of 60 % by Moreno's method of disks.   2. Trace pericardial effusion noted adjacent to the posterior left ventricle.   3. Normal right ventricular cavity size and normal right ventricular systolic function.   4. Aortic valve anatomy cannot be determined with normal systolic excursion. There is calcification of the aortic valve leaflets.   5. Structurally normal mitral valve with normalleaflet excursion.   6. Structurally normal tricuspid valve with normal leaflet excursion. Trace tricuspid regurgitation.   7. The inferior vena cava is normal in size measuring 1.36 cm in diameter, (normal <2.1cm) with normal inspiratory collapse (normal >50%) consistent with normal right atrial pressure (~3, range 0-5mmHg).    ________________________________________________________________________________________  FINDINGS:     Left Ventricle:  The left ventricular cavity is normal in size. Left ventricular systolic function is normal with a calculated ejection fraction of 60 % by the Moreno's biplane method of disks.     Right Ventricle:  The right ventricular cavity is normal in size and right ventricular systolic function is normal. Tricuspid annular plane systolic excursion (TAPSE) is 2.9 cm (normal >=1.7 cm).     Left Atrium:  The left atrium is normal in size with an indexed volume of 33.15 ml/m².     Right Atrium:  The right atrium is normal in size with an indexed volume of 21.17 ml/m².     Interatrial Septum:  The interatrial septum appears intact.     Aortic Valve:  The aortic valve anatomy cannot be determined with normal systolic excursion. There is calcification of the aortic valve leaflets. The peak transaortic velocity is 2.15 m/s, peak transaortic gradient is 18.5 mmHg and mean transaortic gradient is 11.0 mmHg with an LVOT/aortic valve VTI ratio of 0.64. The aortic valve area is estimated at 2.67 cm² by the continuity equation. There is no evidence of aortic regurgitation.     Mitral Valve:  Structurally normal mitral valve with normal leaflet excursion. There is calcification of the mitral valve annulus.     Tricuspid Valve:  Structurally normal tricuspid valve with normal leaflet excursion. There is trace tricuspid regurgitation. Estimated pulmonary artery systolic pressure is 8 mmHg.     Aorta:  The aortic root at the sinuses of Valsalva is normal in size, measuring 3.50 cm (indexed 1.91 cm/m²). The ascending aorta is dilated, measuring 4.10 cm (indexed 2.24 cm/m²).     Pericardium:  There is a trace pericardial effusion noted adjacent to the posterior left ventricle.     Systemic Veins:  The inferior vena cava is normal in size measuring 1.36 cm in diameter, (normal <2.1cm) with normal inspiratory collapse (normal >50%) consistent with normal right atrial pressure (~3, range 0-5mmHg).  ____________________________________________________________________  QUANTITATIVE DATA:  Left Ventricle Measurements: (Indexed to BSA)     IVSd (2D):   1.0 cm  LVPWd (2D):  1.0 cm  LVIDd (2D):  5.3 cm  LVIDs (2D):  3.6 cm  LV Mass:     203 g  111.2 g/m²  LV Vol d, MOD A2C: 97.8 ml  53.50 ml/m²  LV Vol d, MOD A4C: 121.0 ml 66.19 ml/m²  LV Vol d, MOD BP:  109.5 ml 59.90 ml/m²  LV Vol s, MOD A2C: 36.4 ml  19.91 ml/m²  LV Vol s, MOD A4C: 49.5 ml  27.08 ml/m²  LV Vol s, MOD BP:  44.0 ml  24.04 ml/m²  LVOT SV MOD BP:    65.5 ml  LV EF% MOD BP:     60 %     MV E Vmax:    1.02 m/s  MV A Vmax:    0.93 m/s  MV E/A:       1.10  e' lateral:   13.10 cm/s  e' medial:    9.25 cm/s  E/e' lateral: 7.79  E/e' medial:  11.03  E/e' Average: 9.13  MV DT:        151 msec    Aorta Measurements: (Normal range) (Indexed to BSA)     Ao Root d     3.50 cm (3.1 - 3.7 cm) 1.91 cm/m²  Ao Asc d, 2D: 4.10  Ao Asc prox:  4.10 cm               2.24 cm/m²            Left Atrium Measurements: (Indexed to BSA)  LA Diam 2D: 4.40 cm         Right Ventricle Measurements: Right Atrial Measurements:     TAPSE:            2.9 cm      RA Vol:            38.70 ml  RV Base (RVID1):  3.7cm      RA Vol s, MOD A4C  38.7 ml  RV Mid (RVID2):   3.1 cm      RA Vol Index:      21.17 ml/m²  RV Major (RVID3): 8.0 cm      RA Area s, MOD A4C 14.7 cm²       LVOT / RVOT/ Qp/Qs Data: (Indexed to BSA)  LVOT Diameter:  2.30 cm  LVOT Area:      4.15cm²  LVOT Vmax:      1.34 m/s  LVOT Vmn:       0.949 m/s  LVOT VTI:       26.30 cm  LVOT peak grad: 7 mmHg  LVOT mean grad: 4.0 mmHg  LVOT SV:        109.3 ml  59.78 ml/m²    Aortic Valve Measurements:  AV Vmax:                2.2 m/s  AV Peak Gradient:       18.5 mmHg  AV Mean Gradient:       11.0 mmHg  AV VTI:                 41.0 cm  AV VTI Ratio:           0.64  AoV EOA, Contin:        2.67 cm²  AoV EOA, Contin i:      1.46 cm²/m²  AoV Dimensionless Index 0.64    Mitral Valve Measurements:     MV E Vmax: 1.0 m/s  MV A Vmax: 0.9 m/s  MV E/A:    1.1       Tricuspid Valve Measurements:     TR Vmax:          1.2 m/s  TR Peak Gradient: 5.3 mmHg  RA Pressure:      3 mmHg  PASP:             8 mmHg    ________________________________________________________________________________________  Electronically signed on 11/30/2024 at 1:57:15 PM by yKa Gonzalez MD  *** Final ***

## 2024-12-04 NOTE — PROGRESS NOTE ADULT - ASSESSMENT
IMPRESSION  Anemia multifactorial in etiology related to prior renal transplant with meds and now with osteomyelitis and sepsis with Gram neg maury sepsis  Blood and urine culture with E. Coli  Hgb 6.8==>7.3 ==>7.1 g/dL after receiving 1 unit PRBC  expect to be transfusion dependent with active infection    Ferritin 885, iron 22, TIBC 164, sat 14%  PSA 12.3    ,     5cm liver mass  Prior known, s/p liver bx at [facility], reportedly benign, not corroborated    MGUS  IgM-L  hx of splenomegaly 14cm since 2018    RECOMMENDATIONS    Anemia  Follow CBC and transfuse as indicated  recommend conservative mgmt of anemia.   With prior renal transplant would use irradiated PRBC and try to keep transfusions to a minimum.     MGUS and hypercalcemia  VitD1,25OH 102.0, VitD 39.2  Continue renal evaluation.   Note calcium increased 11.7==>11.2==>10.6    Start calcitonin x48hr    Liver mass  Await imaging reports and path report from [facility]  wife to bring in reports.     Osteomyelitis  ID evaluation and management  Orthopedic followup    d/w Dr Upton

## 2024-12-04 NOTE — CONSULT NOTE ADULT - SUBJECTIVE AND OBJECTIVE BOX
[facility]  Kaz Bermeo PA-C  [address] [place] [id number]  [phone]      Chief Complaint:  Patient is a 69y old  Male who presents with a chief complaint of AMS (04 Dec 2024 12:29)      HPI:  70yo M, PMH DM, HTN, HLD, h/o kidney transplant, hypothyroidism, presents to ED from [facility] for AMS. Patient is lethargic and unable to provide history at this time. Information obtained from transfer record and chart.   Patient received Zosyn and Azithromycin - for PNA per transfer record.  UA positive for UTI    INTERVAL HPI  Pt s/e  Pt lethargic and unable to provide meaningful history  Charts reviewed  No reported overt GI bleeding    Allergies:  No Known Allergies      Medications:  acetaminophen   IVPB .. 1000 milliGRAM(s) IV Intermittent every 8 hours PRN  aluminum hydroxide/magnesium hydroxide/simethicone Suspension 30 milliLiter(s) Oral every 4 hours PRN  amLODIPine   Tablet 10 milliGRAM(s) Oral daily  ascorbic acid 500 milliGRAM(s) Oral two times a day  aspirin enteric coated 81 milliGRAM(s) Oral daily  azaTHIOprine 50 milliGRAM(s) Oral two times a day  buPROPion XL (24-Hour) . 300 milliGRAM(s) Oral daily  calcitonin Injectable 310 International Unit(s) IntraMuscular every 12 hours  carvedilol 12.5 milliGRAM(s) Oral every 12 hours  dextrose 5%. 1000 milliLiter(s) IV Continuous <Continuous>  dextrose 5%. 1000 milliLiter(s) IV Continuous <Continuous>  dextrose 50% Injectable 25 Gram(s) IV Push once  dextrose 50% Injectable 12.5 Gram(s) IV Push once  dextrose 50% Injectable 25 Gram(s) IV Push once  dextrose Oral Gel 15 Gram(s) Oral once PRN  ertapenem  IVPB      ertapenem  IVPB 1000 milliGRAM(s) IV Intermittent every 24 hours  escitalopram 10 milliGRAM(s) Oral <User Schedule>  ferrous    sulfate 325 milliGRAM(s) Oral two times a day  glucagon  Injectable 1 milliGRAM(s) IntraMuscular once  hydrALAZINE 100 milliGRAM(s) Oral three times a day  hydrALAZINE Injectable 10 milliGRAM(s) IV Push every 8 hours PRN  insulin glargine Injectable (LANTUS) 17 Unit(s) SubCutaneous at bedtime  insulin lispro (ADMELOG) corrective regimen sliding scale   SubCutaneous three times a day before meals  levothyroxine 88 MICROGram(s) Oral <User Schedule>  lisinopril 20 milliGRAM(s) Oral daily  melatonin 3 milliGRAM(s) Oral at bedtime PRN  metoprolol tartrate Injectable 5 milliGRAM(s) IV Push every 6 hours PRN  mineral oil enema 133 milliLiter(s) Rectal once  multivitamin/minerals/iron Oral Solution (CENTRUM) 15 milliLiter(s) Oral daily  ondansetron Injectable 4 milliGRAM(s) IV Push every 8 hours PRN  polyethylene glycol 3350 17 Gram(s) Oral daily  pyridoxine 50 milliGRAM(s) Oral daily  rosuvastatin 10 milliGRAM(s) Oral at bedtime  senna 2 Tablet(s) Oral at bedtime  sodium chloride 0.45%. 1000 milliLiter(s) IV Continuous <Continuous>  tacrolimus 2 milliGRAM(s) Oral daily  tacrolimus 1 milliGRAM(s) Oral at bedtime      PMHX/PSHX:  Diabetes Mellitus Type II    ESRD on Dialysis    GERD (gastroesophageal reflux disease)    Depression    Hypothyroidism    Hyperlipidemia    Kidney transplanted    Hypertension    ANGELIKA (obstructive sleep apnea)    Peripheral neuropathy    Shoulder fracture    History of colonoscopy    S/P kidney transplant    S/P cholecystectomy    Retroperitoneal fibrosis    AV fistula    S/P right cataract extraction    S/P left cataract extraction    H/O unilateral nephrectomy        Family history:  MI (myocardial infarction)    Family history of obesity (Sibling)    ROS:   Unable to obtain meaningful hx    PHYSICAL EXAM:   Vital Signs:  Vital Signs Last 24 Hrs  T(C): 36.6 (04 Dec 2024 11:39), Max: 37.3 (03 Dec 2024 17:59)  T(F): 97.8 (04 Dec 2024 11:39), Max: 99.1 (03 Dec 2024 17:59)  HR: 72 (04 Dec 2024 11:39) (72 - 79)  BP: 144/67 (04 Dec 2024 11:39) (144/67 - 161/68)  BP(mean): --  RR: 20 (04 Dec 2024 11:39) (18 - 20)  SpO2: 95% (04 Dec 2024 11:39) (91% - 95%)    Parameters below as of 04 Dec 2024 04:50  Patient On (Oxygen Delivery Method): room air      Daily     Daily Weight in k.7 (04 Dec 2024 04:50)    GENERAL:  Appears stated age  HEENT:  NC/AT  CHEST:  Full & symmetric excursion  HEART:  Regular rhythm  ABDOMEN:  Soft, non-tender, non-distended  EXTREMITIES:  no cyanosis, clubbing or edema  SKIN:  No rash  NEURO:  Alert    LABS:                        6.7    x     )-----------( x        ( 04 Dec 2024 10:46 )             21.1     12    137  |  103  |  20  ----------------------------<  318[H]  4.2   |  27  |  0.90    Ca    10.6[H]      04 Dec 2024 09:16  Phos  2.9     1203  Mg     1.4     12-03      Urinalysis Basic - ( 04 Dec 2024 09:16 )    Color: x / Appearance: x / SG: x / pH: x  Gluc: 318 mg/dL / Ketone: x  / Bili: x / Urobili: x   Blood: x / Protein: x / Nitrite: x   Leuk Esterase: x / RBC: x / WBC x   Sq Epi: x / Non Sq Epi: x / Bacteria: x    RADIOLOGY  < from: MR Abdomen No Cont (24 @ 09:47) >    ACC: [id number] EXAM:  MR ABDOMEN   ORDERED BY: TIERA CHILEL     *** ADDENDUM # 1 ***    Upon rereview of the MRI, the spleen has increased in size since   2023 and there appears to be an isointense mass at the superior   aspect of the spleen measuring 8.8; also see right upper quadrant   ultrasound report 12/3/2024; a complete pre and post IV contrast MRI of   the abdomen is recommended for further evaluation of the liver and spleen   lesions.    The findings were discussed with Dr. Upton on 2024 at 9:50 AM    --- End of Report ---    *** END OF ADDENDUM # 1 ***      PROCEDURE DATE:  2024          INTERPRETATION:  CLINICAL INFORMATION: Altered mental status. Sepsis.   Sacral infection. Hepatic lesion.    COMPARISON: Multiple prior studies including CT chest/abdomen/pelvis   2024, CT chest/abdomen/pelvis 2023, and CT abdomen/pelvis   2020    CONTRAST/COMPLICATIONS:  IV Contrast: NONE  Oral Contrast: NONE  .    PROCEDURE:  MRI of the abdomen was performed.  The patient declined to complete the examination.    FINDINGS:  LOWER CHEST: Small bilateral pleural effusions. Left lower lobe opacity,   possibly rounded atelectasis.    LIVER: 5.4 x 4.3 cm high T2 signal lesion in the right hepatic lobe   (series 2 image 18), incompletely characterized on this study, new since   2023.  BILE DUCTS: Mildly dilated upstream extrahepatic related to which tapers   to normal caliber distally it can't be compatible with the   postcholecystectomy state.  GALLBLADDER: Cholecystectomy.  SPLEEN: Enlarged measuring 15.8 cm in length.  PANCREAS: Within normal limits.  ADRENALS: Within normal limits.  KIDNEYS/URETERS: Left nephrectomy. Atrophic negative right kidney with   mild hydronephrosis. Right lower quadrant renal transplant.    VISUALIZED PORTIONS:  BOWEL: Moderate amount retained fecal material in the colon.  PERITONEUM: Small amount of ascites.  VESSELS: Abdominal aorta normal in caliber. Partially imaged soft tissue   surrounding the aorta and IVC, present on 2020 compatible with   retroperitoneal fibrosis.  RETROPERITONEUM/LYMPH NODES: See above.  ABDOMINAL WALL: Unremarkable.  BONES: Partially imaged bilateral sacral fractures.    IMPRESSION:    Incomplete study; patient declined to complete the MRI.    5.4 cm lesion in the right hepatic lobe, incompletely characterized on   this study, new since 2023, suspicious for malignancy; differential   includes metastatic disease or a primary hepatic neoplasm; differential   also includes infection/abscess which is considered less likely; further   evaluation is recommended with ultrasound.    The findings were discussed with Dr. Pittman at 1:10 PM on 12/3/2024.    --- End of Report ---    ***Please see the addendum at the top of this report. It may contain   additional important information or changes.****      SCOTT SAEED MD; Attending Radiologist  This document has been electronically signed. Dec  3 2024  1:22PM  1st Addendum: SCOTT SAEED MD; Attending Radiologist  The first addendum was electronically signed on: Dec  4 2024  9:54AM.    < end of copied text >

## 2024-12-04 NOTE — DISCHARGE NOTE NURSING/CASE MANAGEMENT/SOCIAL WORK - FINANCIAL ASSISTANCE
[facility] provides services at a reduced cost to those who are determined to be eligible through [facility]’s financial assistance program. Information regarding [facility]’s financial assistance program can be found by going to [email or web address] or by calling [phone].

## 2024-12-04 NOTE — PROGRESS NOTE ADULT - SUBJECTIVE AND OBJECTIVE BOX
Patient is a 69y old  Male who presents with a chief complaint of AMS (04 Dec 2024 10:46)      INTERVAL HPI/OVERNIGHT EVENTS:     T(C): 36.6 (12-04-24 @ 11:39), Max: 37.3 (12-03-24 @ 17:59)  HR: 72 (12-04-24 @ 11:39) (72 - 79)  BP: 144/67 (12-04-24 @ 11:39) (144/67 - 161/68)  RR: 20 (12-04-24 @ 11:39) (18 - 20)  SpO2: 95% (12-04-24 @ 11:39) (91% - 95%)  Wt(kg): --  I&O's Summary    03 Dec 2024 07:01  -  04 Dec 2024 07:00  --------------------------------------------------------  IN: 0 mL / OUT: 350 mL / NET: -350 mL        REVIEW OF SYSTEMS:  CONSTITUTIONAL: No fever, weight loss, or fatigue  EYES: No eye pain, visual disturbances, or discharge  ENMT:  No difficulty hearing, tinnitus, vertigo; No sinus or throat pain  NECK: No pain or stiffness  BREASTS: No pain, no masses  RESPIRATORY: No cough, wheezing, chills or hemoptysis; No shortness of breath  CARDIOVASCULAR: No chest pain, palpitations, dizziness, or leg swelling  GASTROINTESTINAL: No abdominal or epigastric pain. No nausea, vomiting, or hematemesis; No diarrhea or constipation. No melena or hematochezia.  GENITOURINARY: No dysuria, frequency, hematuria, or incontinence  NEUROLOGICAL: No headaches, memory loss, loss of strength, numbness, or tremors  SKIN: No itching, burning, rashes, or lesions   MUSCULOSKELETAL: No joint pain or swelling; No muscle, back, or extremity pain    PHYSICAL EXAM:  GENERAL: NAD, well-groomed, well-developed  HEAD:  Atraumatic, Normocephalic  EYES: EOMI, PERRLA, conjunctiva and sclera clear  ENMT: No tonsillar erythema, exudates, or enlargement; Moist mucous membranes  NECK: Supple, No JVD  NERVOUS SYSTEM:  Alert & Oriented X3; Motor Strength 5/5 B/L upper and lower extremities; DTRs 2+ intact and symmetric  CHEST/LUNG: Clear to percussion bilaterally; No rales, rhonchi, wheezing, or rubs  HEART: Regular rate and rhythm; No murmurs, rubs, or gallops  ABDOMEN: Soft, Nontender, Nondistended; Bowel sounds present  EXTREMITIES:  2+ Peripheral Pulses, No clubbing, cyanosis, or edema  SKIN: No rashes or lesions    MEDICATIONS  (STANDING):  amLODIPine   Tablet 10 milliGRAM(s) Oral daily  ascorbic acid 500 milliGRAM(s) Oral two times a day  aspirin enteric coated 81 milliGRAM(s) Oral daily  azaTHIOprine 50 milliGRAM(s) Oral two times a day  buPROPion XL (24-Hour) . 300 milliGRAM(s) Oral daily  carvedilol 12.5 milliGRAM(s) Oral every 12 hours  dextrose 5%. 1000 milliLiter(s) (100 mL/Hr) IV Continuous <Continuous>  dextrose 5%. 1000 milliLiter(s) (50 mL/Hr) IV Continuous <Continuous>  dextrose 50% Injectable 25 Gram(s) IV Push once  dextrose 50% Injectable 12.5 Gram(s) IV Push once  dextrose 50% Injectable 25 Gram(s) IV Push once  ertapenem  IVPB      ertapenem  IVPB 1000 milliGRAM(s) IV Intermittent every 24 hours  escitalopram 10 milliGRAM(s) Oral <User Schedule>  ferrous    sulfate 325 milliGRAM(s) Oral two times a day  glucagon  Injectable 1 milliGRAM(s) IntraMuscular once  hydrALAZINE 100 milliGRAM(s) Oral three times a day  insulin glargine Injectable (LANTUS) 17 Unit(s) SubCutaneous at bedtime  insulin lispro (ADMELOG) corrective regimen sliding scale   SubCutaneous three times a day before meals  levothyroxine 88 MICROGram(s) Oral <User Schedule>  lisinopril 20 milliGRAM(s) Oral daily  mineral oil enema 133 milliLiter(s) Rectal once  multivitamin/minerals/iron Oral Solution (CENTRUM) 15 milliLiter(s) Oral daily  polyethylene glycol 3350 17 Gram(s) Oral daily  pyridoxine 50 milliGRAM(s) Oral daily  rosuvastatin 10 milliGRAM(s) Oral at bedtime  senna 2 Tablet(s) Oral at bedtime  sodium chloride 0.45%. 1000 milliLiter(s) (50 mL/Hr) IV Continuous <Continuous>  tacrolimus 2 milliGRAM(s) Oral daily  tacrolimus 1 milliGRAM(s) Oral at bedtime    MEDICATIONS  (PRN):  acetaminophen   IVPB .. 1000 milliGRAM(s) IV Intermittent every 8 hours PRN Temp greater or equal to 38C (100.4F)  aluminum hydroxide/magnesium hydroxide/simethicone Suspension 30 milliLiter(s) Oral every 4 hours PRN Dyspepsia  dextrose Oral Gel 15 Gram(s) Oral once PRN Blood Glucose LESS THAN 70 milliGRAM(s)/deciliter  hydrALAZINE Injectable 10 milliGRAM(s) IV Push every 8 hours PRN SBP >180 and HR 60-70bpm  melatonin 3 milliGRAM(s) Oral at bedtime PRN Insomnia  metoprolol tartrate Injectable 5 milliGRAM(s) IV Push every 6 hours PRN SBP >170  ondansetron Injectable 4 milliGRAM(s) IV Push every 8 hours PRN Nausea and/or Vomiting      LABS:                        6.7    x     )-----------( x        ( 04 Dec 2024 10:46 )             21.1     12-04    137  |  103  |  20  ----------------------------<  318[H]  4.2   |  27  |  0.90    Ca    10.6[H]      04 Dec 2024 09:16  Phos  2.9     12-03  Mg     1.4     12-03        Urinalysis Basic - ( 04 Dec 2024 09:16 )    Color: x / Appearance: x / SG: x / pH: x  Gluc: 318 mg/dL / Ketone: x  / Bili: x / Urobili: x   Blood: x / Protein: x / Nitrite: x   Leuk Esterase: x / RBC: x / WBC x   Sq Epi: x / Non Sq Epi: x / Bacteria: x      CAPILLARY BLOOD GLUCOSE      POCT Blood Glucose.: 272 mg/dL (04 Dec 2024 11:33)  POCT Blood Glucose.: 314 mg/dL (04 Dec 2024 10:43)  POCT Blood Glucose.: 320 mg/dL (04 Dec 2024 07:51)  POCT Blood Glucose.: 291 mg/dL (03 Dec 2024 21:10)  POCT Blood Glucose.: 246 mg/dL (03 Dec 2024 16:36)              RADIOLOGY & ADDITIONAL TESTS:    Imaging Personally Reviewed:       Advance Directives:      Palliative Care:  Appropriate     Patient is a 69y old  Male who presents with a chief complaint of AMS (04 Dec 2024 10:46)    pt seen and examine  today awake  but weak , ok for blood transfusion , no fever .   INTERVAL HPI/OVERNIGHT EVENTS:     T(C): 36.6 (12-04-24 @ 11:39), Max: 37.3 (12-03-24 @ 17:59)  HR: 72 (12-04-24 @ 11:39) (72 - 79)  BP: 144/67 (12-04-24 @ 11:39) (144/67 - 161/68)  RR: 20 (12-04-24 @ 11:39) (18 - 20)  SpO2: 95% (12-04-24 @ 11:39) (91% - 95%)  Wt(kg): --  I&O's Summary    03 Dec 2024 07:01  -  04 Dec 2024 07:00  --------------------------------------------------------  IN: 0 mL / OUT: 350 mL / NET: -350 mL        REVIEW OF SYSTEMS:  CONSTITUTIONAL: No fever, weight loss, + fatigue  EYES: No eye pain, visual disturbances, or discharge  ENMT:  No difficulty hearing, tinnitus, vertigo; No sinus or throat pain  NECK: No pain or stiffness  RESPIRATORY: No cough, wheezing, chills,  No shortness of breath  CARDIOVASCULAR: No chest pain, palpitations, dizziness, or leg swelling  GASTROINTESTINAL: No abdominal or epigastric pain. No nausea, vomiting, No diarrhea or constipation. No melena or hematochezia.  GENITOURINARY: No dysuria, frequency, hematuria, or incontinence  NEUROLOGICAL: No headaches, memory loss, loss of strength, numbness, or tremors  SKIN: No itching, burning, rashes, or lesions   MUSCULOSKELETAL: No joint pain or swelling; No muscle, back, or extremity pain    PHYSICAL EXAM:  GENERAL: NAD, weak   HEAD:  Atraumatic, Normocephalic  EYES: EOMI, PERRLA, conjunctiva and sclera clear  ENMT:  Moist mucous membranes  NECK: Supple, No JVD  NERVOUS SYSTEM:  Alert & Oriented X2; Motor Strength 5/5 B/L upper and lower extremities; DTRs 2+ intact and symmetric  CHEST/LUNG:   percussion bilaterally; No rales, rhonchi, wheezing   HEART: Regular rate and rhythm; No murmurs,   ABDOMEN: Soft, Nontender, Nondistended; Bowel sounds present  EXTREMITIES:  2+ Peripheral Pulses, No clubbing, cyanosis, or edema  SKIN: No rashes or lesions midline intact     MEDICATIONS  (STANDING):  amLODIPine   Tablet 10 milliGRAM(s) Oral daily  ascorbic acid 500 milliGRAM(s) Oral two times a day  aspirin enteric coated 81 milliGRAM(s) Oral daily  azaTHIOprine 50 milliGRAM(s) Oral two times a day  buPROPion XL (24-Hour) . 300 milliGRAM(s) Oral daily  carvedilol 12.5 milliGRAM(s) Oral every 12 hours  dextrose 5%. 1000 milliLiter(s) (100 mL/Hr) IV Continuous <Continuous>  dextrose 5%. 1000 milliLiter(s) (50 mL/Hr) IV Continuous <Continuous>  dextrose 50% Injectable 25 Gram(s) IV Push once  dextrose 50% Injectable 12.5 Gram(s) IV Push once  dextrose 50% Injectable 25 Gram(s) IV Push once  ertapenem  IVPB      ertapenem  IVPB 1000 milliGRAM(s) IV Intermittent every 24 hours  escitalopram 10 milliGRAM(s) Oral <User Schedule>  ferrous    sulfate 325 milliGRAM(s) Oral two times a day  glucagon  Injectable 1 milliGRAM(s) IntraMuscular once  hydrALAZINE 100 milliGRAM(s) Oral three times a day  insulin glargine Injectable (LANTUS) 17 Unit(s) SubCutaneous at bedtime  insulin lispro (ADMELOG) corrective regimen sliding scale   SubCutaneous three times a day before meals  levothyroxine 88 MICROGram(s) Oral <User Schedule>  lisinopril 20 milliGRAM(s) Oral daily  mineral oil enema 133 milliLiter(s) Rectal once  multivitamin/minerals/iron Oral Solution (CENTRUM) 15 milliLiter(s) Oral daily  polyethylene glycol 3350 17 Gram(s) Oral daily  pyridoxine 50 milliGRAM(s) Oral daily  rosuvastatin 10 milliGRAM(s) Oral at bedtime  senna 2 Tablet(s) Oral at bedtime  sodium chloride 0.45%. 1000 milliLiter(s) (50 mL/Hr) IV Continuous <Continuous>  tacrolimus 2 milliGRAM(s) Oral daily  tacrolimus 1 milliGRAM(s) Oral at bedtime    MEDICATIONS  (PRN):  acetaminophen   IVPB .. 1000 milliGRAM(s) IV Intermittent every 8 hours PRN Temp greater or equal to 38C (100.4F)  aluminum hydroxide/magnesium hydroxide/simethicone Suspension 30 milliLiter(s) Oral every 4 hours PRN Dyspepsia  dextrose Oral Gel 15 Gram(s) Oral once PRN Blood Glucose LESS THAN 70 milliGRAM(s)/deciliter  hydrALAZINE Injectable 10 milliGRAM(s) IV Push every 8 hours PRN SBP >180 and HR 60-70bpm  melatonin 3 milliGRAM(s) Oral at bedtime PRN Insomnia  metoprolol tartrate Injectable 5 milliGRAM(s) IV Push every 6 hours PRN SBP >170  ondansetron Injectable 4 milliGRAM(s) IV Push every 8 hours PRN Nausea and/or Vomiting      LABS:                        6.7    x     )-----------( x        ( 04 Dec 2024 10:46 )             21.1     12-04    137  |  103  |  20  ----------------------------<  318[H]  4.2   |  27  |  0.90    Ca    10.6[H]      04 Dec 2024 09:16  Phos  2.9     12-03  Mg     1.4     12-03        Urinalysis Basic - ( 04 Dec 2024 09:16 )    Color: x / Appearance: x / SG: x / pH: x  Gluc: 318 mg/dL / Ketone: x  / Bili: x / Urobili: x   Blood: x / Protein: x / Nitrite: x   Leuk Esterase: x / RBC: x / WBC x   Sq Epi: x / Non Sq Epi: x / Bacteria: x      CAPILLARY BLOOD GLUCOSE      POCT Blood Glucose.: 272 mg/dL (04 Dec 2024 11:33)  POCT Blood Glucose.: 314 mg/dL (04 Dec 2024 10:43)  POCT Blood Glucose.: 320 mg/dL (04 Dec 2024 07:51)  POCT Blood Glucose.: 291 mg/dL (03 Dec 2024 21:10)  POCT Blood Glucose.: 246 mg/dL (03 Dec 2024 16:36)              RADIOLOGY & ADDITIONAL TESTS:    Imaging Personally Reviewed:  no new test

## 2024-12-04 NOTE — PROGRESS NOTE ADULT - SUBJECTIVE AND OBJECTIVE BOX
[INTERVAL HX: ]  Patient seen and examined;  Chart reviewed and events noted;     no CP, no SOB  somewhat confused      [MEDICATIONS]  MEDICATIONS  (STANDING):  amLODIPine   Tablet 10 milliGRAM(s) Oral daily  ascorbic acid 500 milliGRAM(s) Oral two times a day  aspirin enteric coated 81 milliGRAM(s) Oral daily  azaTHIOprine 50 milliGRAM(s) Oral two times a day  buPROPion XL (24-Hour) . 300 milliGRAM(s) Oral daily  calcitonin Injectable 310 International Unit(s) IntraMuscular every 12 hours  carvedilol 12.5 milliGRAM(s) Oral every 12 hours  dextrose 5%. 1000 milliLiter(s) (100 mL/Hr) IV Continuous <Continuous>  dextrose 5%. 1000 milliLiter(s) (50 mL/Hr) IV Continuous <Continuous>  dextrose 50% Injectable 25 Gram(s) IV Push once  dextrose 50% Injectable 12.5 Gram(s) IV Push once  dextrose 50% Injectable 25 Gram(s) IV Push once  ertapenem  IVPB      ertapenem  IVPB 1000 milliGRAM(s) IV Intermittent every 24 hours  escitalopram 10 milliGRAM(s) Oral <User Schedule>  ferrous    sulfate 325 milliGRAM(s) Oral two times a day  glucagon  Injectable 1 milliGRAM(s) IntraMuscular once  hydrALAZINE 100 milliGRAM(s) Oral three times a day  insulin glargine Injectable (LANTUS) 20 Unit(s) SubCutaneous at bedtime  insulin lispro (ADMELOG) corrective regimen sliding scale   SubCutaneous three times a day before meals  levothyroxine 88 MICROGram(s) Oral <User Schedule>  lisinopril 20 milliGRAM(s) Oral daily  mineral oil enema 133 milliLiter(s) Rectal once  multivitamin/minerals/iron Oral Solution (CENTRUM) 15 milliLiter(s) Oral daily  polyethylene glycol 3350 17 Gram(s) Oral daily  pyridoxine 50 milliGRAM(s) Oral daily  rosuvastatin 10 milliGRAM(s) Oral at bedtime  senna 2 Tablet(s) Oral at bedtime  sodium chloride 0.45%. 1000 milliLiter(s) (50 mL/Hr) IV Continuous <Continuous>  tacrolimus 2 milliGRAM(s) Oral daily  tacrolimus 1 milliGRAM(s) Oral at bedtime    MEDICATIONS  (PRN):  acetaminophen   IVPB .. 1000 milliGRAM(s) IV Intermittent every 8 hours PRN Temp greater or equal to 38C (100.4F)  aluminum hydroxide/magnesium hydroxide/simethicone Suspension 30 milliLiter(s) Oral every 4 hours PRN Dyspepsia  dextrose Oral Gel 15 Gram(s) Oral once PRN Blood Glucose LESS THAN 70 milliGRAM(s)/deciliter  hydrALAZINE Injectable 10 milliGRAM(s) IV Push every 8 hours PRN SBP >180 and HR 60-70bpm  melatonin 3 milliGRAM(s) Oral at bedtime PRN Insomnia  metoprolol tartrate Injectable 5 milliGRAM(s) IV Push every 6 hours PRN SBP >170  ondansetron Injectable 4 milliGRAM(s) IV Push every 8 hours PRN Nausea and/or Vomiting      [VITALS]  Vital Signs Last 24 Hrs  T(C): 36.7 (04 Dec 2024 15:56), Max: 36.9 (04 Dec 2024 04:50)  T(F): 98.1 (04 Dec 2024 15:56), Max: 98.5 (04 Dec 2024 04:50)  HR: 76 (04 Dec 2024 15:56) (72 - 76)  BP: 162/61 (04 Dec 2024 15:56) (144/67 - 162/61)  BP(mean): --  RR: 20 (04 Dec 2024 15:56) (18 - 20)  SpO2: 95% (04 Dec 2024 15:56) (94% - 95%)    Parameters below as of 04 Dec 2024 15:56  Patient On (Oxygen Delivery Method): room air      [WT/HT]  Daily     Daily Weight in k.7 (04 Dec 2024 04:50)  [VENT]      [PHYSICAL EXAM]  GEN: NAD, confused  HEENT: normocephalic and atraumatic. EOMI. .    NECK: Supple.  No lymphadenopathy   LUNGS: Clear to auscultation.  HEART: Regular rate and rhythm,  no MRG  ABDOMEN: Soft, nontender, and nondistended.  Positive bowel sounds.    : No CVA tenderness  EXTREMITIES: +edema.  NEUROLOGIC: grossly intact.  PSYCHIATRIC: Appropriate affect .  SKIN: No rash     [LABS:]                        6.6    x     )-----------( x        ( 04 Dec 2024 16:06 )             20.7     12-04  137  |  103  |  20  ----------------------------<  318[H]  4.2   |  27  |  0.90  Ca    10.6[H]      04 Dec 2024 09:16  Phos  2.9       Mg     1.4           Serum Protein Electrophoresis Interp: Weak Beta Migrating Paraprotein Identified (24 @ 11:50)    Ferritin: 885 ng/mL *H* [30 - 400] (24 @ 05:55)    Iron - Total Binding Capacity.: 164 ug/dL *L* [220 - 430] (24 @ 05:55)    Serum Protein Electrophoresis Interp: Weak Beta Migrating Paraprotein Identified (24 @ 17:10)    Immunofixation, Serum:   Weak IgM Lambda Band Identified      Reference Range: None Detected (24 @ 17:10)  Sedimentation Rate, Erythrocyte: 117 mm/hr *H* [0 - 20] (24 @ 16:05)    Urinalysis Basic - ( 04 Dec 2024 09:16 )  Color: x / Appearance: x / SG: x / pH: x  Gluc: 318 mg/dL / Ketone: x  / Bili: x / Urobili: x   Blood: x / Protein: x / Nitrite: x   Leuk Esterase: x / RBC: x / WBC x   Sq Epi: x / Non Sq Epi: x / Bacteria: x                [RADIOLOGY STUDIES:]

## 2024-12-04 NOTE — PROGRESS NOTE ADULT - TIME BILLING
Note written by attending. Patient seen and examined. Meds, labs, vitals, chart reviewed. wife aware tt/plan .

## 2024-12-04 NOTE — PROGRESS NOTE ADULT - SUBJECTIVE AND OBJECTIVE BOX
Patient is a 69y old  Male who presents with a chief complaint of AMS (04 Dec 2024 14:18)    pt seen and examine  today   more awake  weak , c/o constipation    INTERVAL HPI/OVERNIGHT EVENTS:     T(C): 36.6 (12-04-24 @ 11:39), Max: 37.3 (12-03-24 @ 17:59)  HR: 72 (12-04-24 @ 11:39) (72 - 79)  BP: 144/67 (12-04-24 @ 11:39) (144/67 - 161/68)  RR: 20 (12-04-24 @ 11:39) (18 - 20)  SpO2: 95% (12-04-24 @ 11:39) (91% - 95%)  Wt(kg): --  I&O's Summary    03 Dec 2024 07:01  -  04 Dec 2024 07:00  --------------------------------------------------------  IN: 0 mL / OUT: 350 mL / NET: -350 mL        REVIEW OF SYSTEMS:  CONSTITUTIONAL: No fever, weight loss, or fatigue  EYES: No eye pain, visual disturbances, or discharge  ENMT:  No difficulty hearing, tinnitus, vertigo; No sinus or throat pain  NECK: No pain or stiffness  BREASTS: No pain, no masses  RESPIRATORY: No cough, wheezing, chills ; No shortness of breath  CARDIOVASCULAR: No chest pain, palpitations, dizziness, or leg swelling  GASTROINTESTINAL: No abdominal or epigastric pain. No nausea, vomiting,  No diarrhea or constipation. No melena or hematochezia.  GENITOURINARY: No dysuria, frequency, hematuria, or incontinence  NEUROLOGICAL: No headaches, memory loss, loss of strength, numbness, or tremors  SKIN: No itching, burning, rashes, or lesions   MUSCULOSKELETAL: No joint pain or swelling; No muscle, back, or extremity pain    PHYSICAL EXAM:  GENERAL: NAD,  weak   HEAD:  Atraumatic, Normocephalic  EYES: EOMI, PERRLA, conjunctiva and sclera clear  ENMT t; Moist mucous membranes  NECK: Supple, No JVD  NERVOUS SYSTEM:  Alert & Oriented X3; Motor Strength 5/5 B/L upper and lower extremities; DTRs 2+ intact and symmetric  CHEST/LUNG:   percussion bilaterally; No rales, rhonchi, wheezing,   HEART: Regular rate and rhythm; No murmurs,   ABDOMEN: Soft, Nontender, Nondistended; Bowel sounds present  EXTREMITIES:  2+ Peripheral Pulses, No clubbing, cyanosis, or edema  SKIN: No rashes or lesions    MEDICATIONS  (STANDING):  amLODIPine   Tablet 10 milliGRAM(s) Oral daily  ascorbic acid 500 milliGRAM(s) Oral two times a day  aspirin enteric coated 81 milliGRAM(s) Oral daily  azaTHIOprine 50 milliGRAM(s) Oral two times a day  buPROPion XL (24-Hour) . 300 milliGRAM(s) Oral daily  calcitonin Injectable 310 International Unit(s) IntraMuscular every 12 hours  carvedilol 12.5 milliGRAM(s) Oral every 12 hours  dextrose 5%. 1000 milliLiter(s) (100 mL/Hr) IV Continuous <Continuous>  dextrose 5%. 1000 milliLiter(s) (50 mL/Hr) IV Continuous <Continuous>  dextrose 50% Injectable 25 Gram(s) IV Push once  dextrose 50% Injectable 12.5 Gram(s) IV Push once  dextrose 50% Injectable 25 Gram(s) IV Push once  ertapenem  IVPB      ertapenem  IVPB 1000 milliGRAM(s) IV Intermittent every 24 hours  escitalopram 10 milliGRAM(s) Oral <User Schedule>  ferrous    sulfate 325 milliGRAM(s) Oral two times a day  glucagon  Injectable 1 milliGRAM(s) IntraMuscular once  hydrALAZINE 100 milliGRAM(s) Oral three times a day  insulin glargine Injectable (LANTUS) 17 Unit(s) SubCutaneous at bedtime  insulin lispro (ADMELOG) corrective regimen sliding scale   SubCutaneous three times a day before meals  levothyroxine 88 MICROGram(s) Oral <User Schedule>  lisinopril 20 milliGRAM(s) Oral daily  mineral oil enema 133 milliLiter(s) Rectal once  multivitamin/minerals/iron Oral Solution (CENTRUM) 15 milliLiter(s) Oral daily  polyethylene glycol 3350 17 Gram(s) Oral daily  pyridoxine 50 milliGRAM(s) Oral daily  rosuvastatin 10 milliGRAM(s) Oral at bedtime  senna 2 Tablet(s) Oral at bedtime  sodium chloride 0.45%. 1000 milliLiter(s) (50 mL/Hr) IV Continuous <Continuous>  tacrolimus 2 milliGRAM(s) Oral daily  tacrolimus 1 milliGRAM(s) Oral at bedtime    MEDICATIONS  (PRN):  acetaminophen   IVPB .. 1000 milliGRAM(s) IV Intermittent every 8 hours PRN Temp greater or equal to 38C (100.4F)  aluminum hydroxide/magnesium hydroxide/simethicone Suspension 30 milliLiter(s) Oral every 4 hours PRN Dyspepsia  dextrose Oral Gel 15 Gram(s) Oral once PRN Blood Glucose LESS THAN 70 milliGRAM(s)/deciliter  hydrALAZINE Injectable 10 milliGRAM(s) IV Push every 8 hours PRN SBP >180 and HR 60-70bpm  melatonin 3 milliGRAM(s) Oral at bedtime PRN Insomnia  metoprolol tartrate Injectable 5 milliGRAM(s) IV Push every 6 hours PRN SBP >170  ondansetron Injectable 4 milliGRAM(s) IV Push every 8 hours PRN Nausea and/or Vomiting      LABS:                        6.7    x     )-----------( x        ( 04 Dec 2024 10:46 )             21.1     12-04    137  |  103  |  20  ----------------------------<  318[H]  4.2   |  27  |  0.90    Ca    10.6[H]      04 Dec 2024 09:16  Phos  2.9     12-03  Mg     1.4     12-03        Urinalysis Basic - ( 04 Dec 2024 09:16 )    Color: x / Appearance: x / SG: x / pH: x  Gluc: 318 mg/dL / Ketone: x  / Bili: x / Urobili: x   Blood: x / Protein: x / Nitrite: x   Leuk Esterase: x / RBC: x / WBC x   Sq Epi: x / Non Sq Epi: x / Bacteria: x      CAPILLARY BLOOD GLUCOSE      POCT Blood Glucose.: 272 mg/dL (04 Dec 2024 11:33)  POCT Blood Glucose.: 314 mg/dL (04 Dec 2024 10:43)  POCT Blood Glucose.: 320 mg/dL (04 Dec 2024 07:51)  POCT Blood Glucose.: 291 mg/dL (03 Dec 2024 21:10)  POCT Blood Glucose.: 246 mg/dL (03 Dec 2024 16:36)              RADIOLOGY & ADDITIONAL TESTS:    Imaging Personally Reviewed:       Advance Directives:      Palliative Care:  Appropriate

## 2024-12-04 NOTE — DISCHARGE NOTE NURSING/CASE MANAGEMENT/SOCIAL WORK - [id number]_ALL_CORE
For information on Fall & Injury Prevention, visit: [email or web address] OR  [email or web address] OR  https://www.cdc.gov/steadi/patient.html

## 2024-12-04 NOTE — PROGRESS NOTE ADULT - ASSESSMENT
70yo M, PMH DM, HTN, HLD, h/o kidney transplant, hypothyroidism, presents to ED from [facility] for AMS, found to be febrile with positive UA. 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 Cardiology called for pericardial effusion.     - CT revealed Small left pleural effusion with small loculated components, Small to moderate pericardial effusion. Approximately 5.5 cm low-density lesion anterior right hepatic lobe. Atrophic right kidney. Absent left kidney. 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 Colonic fecal retention. Stercoral proctitis. Patchy sclerosis and osteolysis throughout the bilateral sacrum with pathologic fractures. There is soft tissue with stranding extending throughout the presacral and posterior extraperitoneal pelvic soft tissues. There are a few bubbles of air/gas at the right sacral pathologic fracture, findings suggestive of infection/osteomyelitis.   - MRI pelvis w/ bilateral sacral lona fractures with marked osseous edema and marked loss of normal T1 fatty marrow. Osseous edema with loss of normal T1 fatty marrow at thecaudal aspect of the left posterior iliac bone adjacent to the sacroiliac joint. These findings could be secondary to extensive fracture deformities in an osteopenic patient versus osteomyelitis if there was a history of a soft tissue ulcer extending to bone versus metastatic disease given the marked loss of T1 fatty marrow if there is a history of malignancy.   - US abdomen 5.5 cm hypoechoic lesion in the right lobe of the liver with internal blood flow compatible with solid mass. Primary or metastatic disease is considered.   - Management per primary team   - Orthopedic seen. No acute orthopedic surgical intervention at this time.  - ID and surgery following   - Continue antibiotics     - TTE 11/29 showed normal lv systolic function, ef 60%, normal rv size and function. trace tr. trace pericardial effusion adjacent to posterior lv.   - Pt w/no signs of tamponade on examination    - No evidence of any meaningful volume overload   - Avoid anticoagulants   - IV fluids as needed. Avoid reducing preload     - EKG with nonspecific TWI anteriorly. repeat unchanged  - No evidence of any active ischemia   - Continue aspirin and statin     - BP uncontrolled 140-160s   - Continue Norvasc 10, Coreg 12.5, Hydralazine, Lisinopril 20  - Up titrate Lisinopril for BP control    - Anemia w/u per primary   - Hemoglobin <--6.3, <--7.0, <--7.1  - Hematocrit <--19.4, <--21.4, <--21.8  - Trend and transfuse per primary    - Monitor and replete lytes, keep K>4, Mg>2.  - Will continue to follow.    Robin Cordoba, MS FNP, [facility]  Nurse Practitioner- Cardiology   Please call on TEAMS

## 2024-12-04 NOTE — PROGRESS NOTE ADULT - ASSESSMENT
CKD 3, h/o Kidney transplant ~2012, h/o Left Nephrectomy  Diabetes  Hypertension  Sepsis, UTI, Sacral osteomyelitis, Gram negative bacteremia  Hypokalemia  Hypercalcemia  Hypomagnesemia  Hypophosphatemia  Anemia  + Liver mass    11/30/24: IV hydration. Potassium and magnesium supplementation. Work up for hypercalcemia as ordered. To continue preadmission immuno suppressants.   Monitor blood sugar levels. Insulin coverage as needed. Dietary restriction. Trend BP and titrate BP meds as needed.   Avoid nephrotoxic meds as possible. IV abx, ID follow up. Will follow electrolytes and renal function trend.   12/01/24: Lower IVF. Trend BP and titrate BP meds as needed. To continue current meds. Phosphorous supplementation. IV abx, ID follow up. Discussed with patients wife at the bedside.   12/02/24: Stable renal indices. To continue current meds. Magnesium and phos supps. May need PRBC tx if no improvement. Will follow electrolytes and renal function trend.   12/03/24: Renal indices remain stable. PRBC transfusion. Avoid nephrotoxic meds as possible. Potassium supplementation. Work up for liver mass.   12/04/24: Stable renal indices. MRI results noted. To continue current meds. Monitor h/h trend. Transfuse PRBC's PRN.

## 2024-12-04 NOTE — DISCHARGE NOTE NURSING/CASE MANAGEMENT/SOCIAL WORK - PATIENT PORTAL LINK FT
You can access the FollowMyHealth Patient Portal offered by [facility] by registering at the following website: [email or web address]. By joining [employer]s FollowMyHealth portal, you will also be able to view your health information using other applications (apps) compatible with our system.

## 2024-12-05 LAB
AFP-TM SERPL-MCNC: <1.8 NG/ML — SIGNIFICANT CHANGE UP
ANION GAP SERPL CALC-SCNC: 9 MMOL/L — SIGNIFICANT CHANGE UP (ref 5–17)
BASOPHILS # BLD AUTO: 0.02 K/UL — SIGNIFICANT CHANGE UP (ref 0–0.2)
BASOPHILS NFR BLD AUTO: 0.3 % — SIGNIFICANT CHANGE UP (ref 0–2)
BUN SERPL-MCNC: 16 MG/DL — SIGNIFICANT CHANGE UP (ref 7–23)
CALCIUM SERPL-MCNC: 10.6 MG/DL — HIGH (ref 8.5–10.1)
CHLORIDE SERPL-SCNC: 103 MMOL/L — SIGNIFICANT CHANGE UP (ref 96–108)
CO2 SERPL-SCNC: 25 MMOL/L — SIGNIFICANT CHANGE UP (ref 22–31)
CREAT SERPL-MCNC: 0.84 MG/DL — SIGNIFICANT CHANGE UP (ref 0.5–1.3)
CULTURE RESULTS: SIGNIFICANT CHANGE UP
CULTURE RESULTS: SIGNIFICANT CHANGE UP
EGFR: 96 ML/MIN/1.73M2 — SIGNIFICANT CHANGE UP
EOSINOPHIL # BLD AUTO: 0.05 K/UL — SIGNIFICANT CHANGE UP (ref 0–0.5)
EOSINOPHIL NFR BLD AUTO: 0.9 % — SIGNIFICANT CHANGE UP (ref 0–6)
FERRITIN SERPL-MCNC: 973 NG/ML — HIGH (ref 30–400)
GLUCOSE BLDC GLUCOMTR-MCNC: 146 MG/DL — HIGH (ref 70–99)
GLUCOSE BLDC GLUCOMTR-MCNC: 213 MG/DL — HIGH (ref 70–99)
GLUCOSE BLDC GLUCOMTR-MCNC: 251 MG/DL — HIGH (ref 70–99)
GLUCOSE BLDC GLUCOMTR-MCNC: 302 MG/DL — HIGH (ref 70–99)
GLUCOSE SERPL-MCNC: 144 MG/DL — HIGH (ref 70–99)
HBV CORE AB SER-ACNC: SIGNIFICANT CHANGE UP
HBV SURFACE AB SER-ACNC: REACTIVE — SIGNIFICANT CHANGE UP
HBV SURFACE AG SER-ACNC: SIGNIFICANT CHANGE UP
HCT VFR BLD CALC: 23.7 % — LOW (ref 39–50)
HCV AB S/CO SERPL IA: 0.29 S/CO — SIGNIFICANT CHANGE UP (ref 0–0.99)
HCV AB SERPL-IMP: SIGNIFICANT CHANGE UP
HGB BLD-MCNC: 7.8 G/DL — LOW (ref 13–17)
IGA FLD-MCNC: 346 MG/DL — SIGNIFICANT CHANGE UP (ref 84–499)
IGG FLD-MCNC: 1460 MG/DL — SIGNIFICANT CHANGE UP (ref 610–1660)
IGM SERPL-MCNC: 165 MG/DL — SIGNIFICANT CHANGE UP (ref 35–242)
IMM GRANULOCYTES NFR BLD AUTO: 1 % — HIGH (ref 0–0.9)
IRON SATN MFR SERPL: 16 UG/DL — LOW (ref 45–165)
IRON SATN MFR SERPL: 9 % — LOW (ref 16–55)
KAPPA LC SER QL IFE: 11.12 MG/DL — HIGH (ref 0.33–1.94)
KAPPA/LAMBDA FREE LIGHT CHAIN RATIO, SERUM: 2.19 RATIO — HIGH (ref 0.26–1.65)
LAMBDA LC SER QL IFE: 5.08 MG/DL — HIGH (ref 0.57–2.63)
LDH SERPL L TO P-CCNC: 449 U/L — HIGH (ref 50–242)
LYMPHOCYTES # BLD AUTO: 0.28 K/UL — LOW (ref 1–3.3)
LYMPHOCYTES # BLD AUTO: 4.9 % — LOW (ref 13–44)
MCHC RBC-ENTMCNC: 28.9 PG — SIGNIFICANT CHANGE UP (ref 27–34)
MCHC RBC-ENTMCNC: 32.9 G/DL — SIGNIFICANT CHANGE UP (ref 32–36)
MCV RBC AUTO: 87.8 FL — SIGNIFICANT CHANGE UP (ref 80–100)
MONOCYTES # BLD AUTO: 0.43 K/UL — SIGNIFICANT CHANGE UP (ref 0–0.9)
MONOCYTES NFR BLD AUTO: 7.5 % — SIGNIFICANT CHANGE UP (ref 2–14)
NEUTROPHILS # BLD AUTO: 4.9 K/UL — SIGNIFICANT CHANGE UP (ref 1.8–7.4)
NEUTROPHILS NFR BLD AUTO: 85.4 % — HIGH (ref 43–77)
NRBC # BLD: 0 /100 WBCS — SIGNIFICANT CHANGE UP (ref 0–0)
PLATELET # BLD AUTO: 298 K/UL — SIGNIFICANT CHANGE UP (ref 150–400)
POTASSIUM SERPL-MCNC: 3.8 MMOL/L — SIGNIFICANT CHANGE UP (ref 3.5–5.3)
POTASSIUM SERPL-SCNC: 3.8 MMOL/L — SIGNIFICANT CHANGE UP (ref 3.5–5.3)
RBC # BLD: 2.7 M/UL — LOW (ref 4.2–5.8)
RBC # FLD: 20.2 % — HIGH (ref 10.3–14.5)
SODIUM SERPL-SCNC: 137 MMOL/L — SIGNIFICANT CHANGE UP (ref 135–145)
SPECIMEN SOURCE: SIGNIFICANT CHANGE UP
SPECIMEN SOURCE: SIGNIFICANT CHANGE UP
TIBC SERPL-MCNC: 182 UG/DL — LOW (ref 220–430)
UIBC SERPL-MCNC: 165 UG/DL — SIGNIFICANT CHANGE UP (ref 110–370)
WBC # BLD: 5.83 K/UL — SIGNIFICANT CHANGE UP (ref 3.8–10.5)
WBC # FLD AUTO: 5.83 K/UL — SIGNIFICANT CHANGE UP (ref 3.8–10.5)

## 2024-12-05 PROCEDURE — 99232 SBSQ HOSP IP/OBS MODERATE 35: CPT

## 2024-12-05 PROCEDURE — 99233 SBSQ HOSP IP/OBS HIGH 50: CPT | Mod: GC

## 2024-12-05 RX ORDER — PANTOPRAZOLE SODIUM 40 MG/1
40 TABLET, DELAYED RELEASE ORAL
Refills: 0 | Status: DISCONTINUED | OUTPATIENT
Start: 2024-12-05 | End: 2024-12-18

## 2024-12-05 RX ORDER — LISINOPRIL 20 MG/1
40 TABLET ORAL DAILY
Refills: 0 | Status: DISCONTINUED | OUTPATIENT
Start: 2024-12-05 | End: 2024-12-16

## 2024-12-05 RX ADMIN — Medication 325 MILLIGRAM(S): at 05:45

## 2024-12-05 RX ADMIN — ESCITALOPRAM OXALATE 10 MILLIGRAM(S): 10 TABLET, FILM COATED ORAL at 11:28

## 2024-12-05 RX ADMIN — Medication 2 TABLET(S): at 21:41

## 2024-12-05 RX ADMIN — POLYETHYLENE GLYCOL 3350 17 GRAM(S): 17 POWDER, FOR SOLUTION ORAL at 11:26

## 2024-12-05 RX ADMIN — PANTOPRAZOLE SODIUM 40 MILLIGRAM(S): 40 TABLET, DELAYED RELEASE ORAL at 17:22

## 2024-12-05 RX ADMIN — CARVEDILOL 12.5 MILLIGRAM(S): 25 TABLET, FILM COATED ORAL at 17:22

## 2024-12-05 RX ADMIN — ESCITALOPRAM OXALATE 10 MILLIGRAM(S): 10 TABLET, FILM COATED ORAL at 06:55

## 2024-12-05 RX ADMIN — LISINOPRIL 20 MILLIGRAM(S): 20 TABLET ORAL at 05:45

## 2024-12-05 RX ADMIN — CALCITONIN SALMON 310 INTERNATIONAL UNIT(S): 200 INJECTION, SOLUTION INTRAMUSCULAR; SUBCUTANEOUS at 05:44

## 2024-12-05 RX ADMIN — Medication 8: at 16:48

## 2024-12-05 RX ADMIN — AZATHIOPRINE 50 MILLIGRAM(S): 100 TABLET ORAL at 05:45

## 2024-12-05 RX ADMIN — CARVEDILOL 12.5 MILLIGRAM(S): 25 TABLET, FILM COATED ORAL at 05:45

## 2024-12-05 RX ADMIN — Medication 500 MILLIGRAM(S): at 05:45

## 2024-12-05 RX ADMIN — Medication 300 MILLIGRAM(S): at 11:31

## 2024-12-05 RX ADMIN — INSULIN GLARGINE 20 UNIT(S): 100 INJECTION, SOLUTION SUBCUTANEOUS at 21:42

## 2024-12-05 RX ADMIN — TACROLIMUS 1 MILLIGRAM(S): 5 CAPSULE ORAL at 21:42

## 2024-12-05 RX ADMIN — Medication 15 MILLILITER(S): at 11:34

## 2024-12-05 RX ADMIN — HYDRALAZINE HYDROCHLORIDE 100 MILLIGRAM(S): 10 TABLET ORAL at 13:54

## 2024-12-05 RX ADMIN — Medication 500 MILLIGRAM(S): at 17:22

## 2024-12-05 RX ADMIN — AMLODIPINE BESYLATE 10 MILLIGRAM(S): 10 TABLET ORAL at 05:45

## 2024-12-05 RX ADMIN — ERTAPENEM 120 MILLIGRAM(S): 1 INJECTION, POWDER, LYOPHILIZED, FOR SOLUTION INTRAMUSCULAR; INTRAVENOUS at 05:44

## 2024-12-05 RX ADMIN — Medication 325 MILLIGRAM(S): at 17:22

## 2024-12-05 RX ADMIN — Medication 81 MILLIGRAM(S): at 11:31

## 2024-12-05 RX ADMIN — ESCITALOPRAM OXALATE 10 MILLIGRAM(S): 10 TABLET, FILM COATED ORAL at 18:13

## 2024-12-05 RX ADMIN — Medication 88 MICROGRAM(S): at 06:56

## 2024-12-05 RX ADMIN — Medication 50 MILLIGRAM(S): at 11:29

## 2024-12-05 RX ADMIN — Medication 50 MILLILITER(S): at 21:41

## 2024-12-05 RX ADMIN — Medication 4: at 07:55

## 2024-12-05 RX ADMIN — CALCITONIN SALMON 310 INTERNATIONAL UNIT(S): 200 INJECTION, SOLUTION INTRAMUSCULAR; SUBCUTANEOUS at 17:23

## 2024-12-05 RX ADMIN — HYDRALAZINE HYDROCHLORIDE 100 MILLIGRAM(S): 10 TABLET ORAL at 05:45

## 2024-12-05 RX ADMIN — ROSUVASTATIN CALCIUM 10 MILLIGRAM(S): 5 TABLET, FILM COATED ORAL at 21:42

## 2024-12-05 RX ADMIN — HYDRALAZINE HYDROCHLORIDE 100 MILLIGRAM(S): 10 TABLET ORAL at 21:42

## 2024-12-05 RX ADMIN — TACROLIMUS 2 MILLIGRAM(S): 5 CAPSULE ORAL at 11:32

## 2024-12-05 RX ADMIN — Medication 50 MILLILITER(S): at 01:59

## 2024-12-05 RX ADMIN — AZATHIOPRINE 50 MILLIGRAM(S): 100 TABLET ORAL at 17:52

## 2024-12-05 NOTE — PROGRESS NOTE ADULT - ASSESSMENT
Patient is a 68yo M, PMH DM, HTN, HLD, h/o kidney transplant, hypothyroidism, presents to ED from [facility] for AMS. Patient is lethargic and unable to provide history at this time.    ESBL Bacteremia 2/2 Acute Cystitis  Sacral Wound/OM  Liver Mass w/ mets  Fevers  AMS 2/2 above  Febrile in the .5  UA positive for UTI  Flu/COVID/RSV negative  11/29 BCx ESBL E.coli , repeat negative  11/29 UCx   50,000 - 99,000 CFU/mL Escherichia coli    CT CAP w/ Small left pleural effusion with small loculated components  Right pelvic transplant kidney with mild fullness of the pelvic alyceal system. Stercoral proctitis.  Patchy sclerosis and osteolysis throughout the bilateral sacrum with pathologic fractures. There is soft tissue with stranding extending throughout the presacral and posterior extraperitoneal pelvic soft tissues.  There are a few bubbles of air/gas at the right sacral pathologic fracture, findings suggestive of infection/osteomyelitis.    MRI Again seen are comminuted bilateral sacral lona fractures with marked osseous edema and marked loss of normal T1 fatty marrow. Osseous edema with loss of normal T1 fatty marrow at thecaudal aspect of the left   posterior iliac bone adjacent to the sacroiliac joint. Previously seen fracture component is better visualized on CT. These findings could be secondary to extensive fracture deformities in an osteopenic patient   versus osteomyelitis if there was a history of a soft tissue ulcer extending to bone versus metastatic disease given the marked loss of T1 fatty marrow if there is a history of malignancy. Clinical correlation   with patient history is advised.    Reviewed w/ admitting attending, no sacral wound present. Suspect findings on sacral more related to mets from possible malignancy    Recommendations:   C/w ertapenem 1gm q24h x10 day course until 12/10  Trend temps/WBC  Monitor Hgb, transfusion prn protocol  Surgery following  Additional care per primary team    Infectious Diseases will follow. Please call with any questions.  Vanessa Saul M.D.  [facility] Division of Infectious Diseases [phone]  For after 5 P.M. and weekends, please call [phone]  Available on [employer] TEAMS

## 2024-12-05 NOTE — PROGRESS NOTE ADULT - ASSESSMENT
Abnormal imaging  Anemia    CBC noted  Transfuse prn  PPI for ppx  Monitor for any overt GI bleeding  MRI non-contrast noted  Will need MR abdomen w/wo IV contrast if within GOC, if it was already done as outpatient need to obtain records  Will follow up    I reviewed the overnight course of events on the unit, re-confirming the patient history. I discussed the care with the patient.  Differential diagnosis and plan of care discussed with patient after the evaluation.  35 minutes spent on total encounter of which more than fifty percent of the encounter was spent counseling and/or coordinating care by the attending physician.

## 2024-12-05 NOTE — PROGRESS NOTE ADULT - SUBJECTIVE AND OBJECTIVE BOX
[facility], Division of Infectious Diseases  WANG Mccoy Y. Patel, S. Shah, G. [facility]  [phone]    Name: JAN CALVIN  Age: 69y  Gender: Male  MRN: [id number]    Interval History:  No acute overnight events.   Notes reviewed    Antibiotics:  ertapenem  IVPB      ertapenem  IVPB 1000 milliGRAM(s) IV Intermittent every 24 hours      Medications:  acetaminophen   IVPB .. 1000 milliGRAM(s) IV Intermittent every 8 hours PRN  aluminum hydroxide/magnesium hydroxide/simethicone Suspension 30 milliLiter(s) Oral every 4 hours PRN  amLODIPine   Tablet 10 milliGRAM(s) Oral daily  ascorbic acid 500 milliGRAM(s) Oral two times a day  aspirin enteric coated 81 milliGRAM(s) Oral daily  azaTHIOprine 50 milliGRAM(s) Oral two times a day  buPROPion XL (24-Hour) . 300 milliGRAM(s) Oral daily  calcitonin Injectable 310 International Unit(s) IntraMuscular every 12 hours  carvedilol 12.5 milliGRAM(s) Oral every 12 hours  dextrose 5%. 1000 milliLiter(s) IV Continuous <Continuous>  dextrose 5%. 1000 milliLiter(s) IV Continuous <Continuous>  dextrose 50% Injectable 25 Gram(s) IV Push once  dextrose 50% Injectable 12.5 Gram(s) IV Push once  dextrose 50% Injectable 25 Gram(s) IV Push once  dextrose Oral Gel 15 Gram(s) Oral once PRN  ertapenem  IVPB      ertapenem  IVPB 1000 milliGRAM(s) IV Intermittent every 24 hours  escitalopram 10 milliGRAM(s) Oral <User Schedule>  ferrous    sulfate 325 milliGRAM(s) Oral two times a day  glucagon  Injectable 1 milliGRAM(s) IntraMuscular once  hydrALAZINE 100 milliGRAM(s) Oral three times a day  hydrALAZINE Injectable 10 milliGRAM(s) IV Push every 8 hours PRN  insulin glargine Injectable (LANTUS) 20 Unit(s) SubCutaneous at bedtime  insulin lispro (ADMELOG) corrective regimen sliding scale   SubCutaneous three times a day before meals  levothyroxine 88 MICROGram(s) Oral <User Schedule>  lisinopril 20 milliGRAM(s) Oral daily  melatonin 3 milliGRAM(s) Oral at bedtime PRN  metoprolol tartrate Injectable 5 milliGRAM(s) IV Push every 6 hours PRN  mineral oil enema 133 milliLiter(s) Rectal once  multivitamin/minerals/iron Oral Solution (CENTRUM) 15 milliLiter(s) Oral daily  ondansetron Injectable 4 milliGRAM(s) IV Push every 8 hours PRN  pantoprazole    Tablet 40 milliGRAM(s) Oral two times a day  polyethylene glycol 3350 17 Gram(s) Oral daily  pyridoxine 50 milliGRAM(s) Oral daily  rosuvastatin 10 milliGRAM(s) Oral at bedtime  senna 2 Tablet(s) Oral at bedtime  sodium chloride 0.45%. 1000 milliLiter(s) IV Continuous <Continuous>  tacrolimus 2 milliGRAM(s) Oral daily  tacrolimus 1 milliGRAM(s) Oral at bedtime      Review of Systems:  unable to obtain    Allergies: No Known Allergies    For details regarding the patient's past medical history, social history, family history, and other miscellaneous elements, please refer the initial infectious diseases consultation and/or the admitting history and physical examination for this admission.    Objective:  Vitals:   T(C): 36.8 (12-05-24 @ 11:07), Max: 37.2 (12-04-24 @ 19:13)  HR: 77 (12-05-24 @ 11:07) (71 - 77)  BP: 197/70 (12-05-24 @ 11:07) (144/67 - 197/70)  RR: 18 (12-05-24 @ 04:24) (18 - 20)  SpO2: 97% (12-05-24 @ 11:07) (94% - 97%)    Physical Examination:  General: no acute distress  HEENT: NC/AT, EOMI,  Cardio: RRR  Resp: breath sounds heard bilaterally, no rales, wheezes or rhonchi  Abd: soft, NT, ND  Ext: no edema or cyanosis  Skin: warm, dry, no visible rash      Laboratory Studies:  CBC:                       7.8    5.83  )-----------( 298      ( 05 Dec 2024 10:00 )             23.7     CMP: 12-05    137  |  103  |  16  ----------------------------<  144[H]  3.8   |  25  |  0.84    Ca    10.6[H]      05 Dec 2024 10:00        Urinalysis Basic - ( 05 Dec 2024 10:00 )    Color: x / Appearance: x / SG: x / pH: x  Gluc: 144 mg/dL / Ketone: x  / Bili: x / Urobili: x   Blood: x / Protein: x / Nitrite: x   Leuk Esterase: x / RBC: x / WBC x   Sq Epi: x / Non Sq Epi: x / Bacteria: x        Microbiology: reviewed    Culture - Blood (collected 11-30-24 @ 07:05)  Source: .Blood BLOOD  Preliminary Report (12-04-24 @ 13:01):    No growth at 4 days    Culture - Blood (collected 11-30-24 @ 07:00)  Source: .Blood BLOOD  Preliminary Report (12-04-24 @ 13:01):    No growth at 4 days    Culture - Urine (collected 11-29-24 @ 11:00)  Source: Catheterized  Final Report (12-01-24 @ 10:17):    50,000 - 99,000 CFU/mL Escherichia coli ESBL  Organism: Escherichia coli ESBL (12-01-24 @ 10:17)  Organism: Escherichia coli ESBL (12-01-24 @ 10:17)      Method Type: CHRIST      -  Ampicillin: R >16 These ampicillin results predict results for amoxicillin      -  Ampicillin/Sulbactam: I 16/8      -  Aztreonam: R >16      -  Cefazolin: R >16 For uncomplicated UTI with K. pneumoniae, E. coli, or P. mirablis: CHRIST <=16 is sensitive and CHRIST >=32 is resistant. This also predicts results for oral agents cefaclor, cefdinir, cefpodoxime, cefprozil, cefuroxime axetil, cephalexin and locarbef for uncomplicated UTI. Note that some isolates may be susceptible to these agents while testing resistant to cefazolin.      -  Cefepime: R >16      -  Ceftriaxone: R >32      -  Cefuroxime: R >16      -  Ciprofloxacin: R >2      -  Ertapenem: S <=0.5      -  Gentamicin: R >8      -  Imipenem: S <=1      -  Levofloxacin: R 4      -  Meropenem: S <=1      -  Nitrofurantoin: S <=32 Should not be used to treat pyelonephritis      -  Piperacillin/Tazobactam: S <=8      -  Tobramycin: R >8      -  Trimethoprim/Sulfamethoxazole: R >2/38    Urinalysis with Rflx Culture (collected 11-29-24 @ 11:00)    Culture - Blood (collected 11-29-24 @ 07:15)  Source: .Blood BLOOD  Gram Stain (11-29-24 @ 21:31):    Growth in aerobic bottle: Gram Negative Rods    Growth in anaerobic bottle: Gram Negative Rods  Final Report (12-01-24 @ 17:40):    Growth in aerobic and anaerobic bottles: Escherichia coli ESBL  Organism: Escherichia coli ESBL (12-01-24 @ 17:40)  Organism: Escherichia coli ESBL (12-01-24 @ 17:40)      Method Type: CHRIST      -  Ampicillin: R >16 These ampicillin results predict results for amoxicillin      -  Ampicillin/Sulbactam: R >16/8      -  Aztreonam: R >16      -  Cefazolin: R >16      -  Cefepime: R >16      -  Ceftriaxone: R >32      -  Ciprofloxacin: R >2      -  Ertapenem: S <=0.5      -  Gentamicin: R >8      -  Imipenem: S <=1      -  Levofloxacin: R 4      -  Meropenem: S <=1      -  Piperacillin/Tazobactam: R <=8      -  Tobramycin: R >8      -  Trimethoprim/Sulfamethoxazole: R >2/38    Culture - Blood (collected 11-29-24 @ 07:10)  Source: .Blood BLOOD  Gram Stain (11-29-24 @ 22:18):    Growth in anaerobic bottle: Gram Negative Rods    Growth in aerobic bottle: Gram Negative Rods  Final Report (12-01-24 @ 17:41):    Growth in aerobic and anaerobic bottles: Escherichia coli ESBL    Direct identification is available within approximately 3-5    hours either by Blood Panel Multiplexed PCR or Direct    MALDI-TOF. Details: [email or web address]  Organism: Blood Culture PCR (12-01-24 @ 17:41)  Organism: Blood Culture PCR (12-01-24 @ 17:41)      Method Type: PCR      -  Escherichia coli: Detec      -  ESBL: Detec      -  CTX-M Resistance Marker: Detec          Radiology: reviewed

## 2024-12-05 NOTE — PROGRESS NOTE ADULT - SUBJECTIVE AND OBJECTIVE BOX
[facility]                                                       Dr. Bruce, Dr. Prabhakar, Dr. Cabrales, Dr. Zaragoza, Dr. Bingham, Dr. Loya                                      [facility], [facility], [address]                                                 [address] [address]                                      Ph: [phone]  Fax: [phone]                                         Ph: [phone]  Fax: [phone]      Patient is a 69y old  Male who presents with a chief complaint of AMS (01 Dec 2024 11:19)  Patient seen in follow up for CKD, h/o Kidney transplant.        PAST MEDICAL HISTORY:  Diabetes Mellitus Type II    ESRD on Dialysis    GERD (gastroesophageal reflux disease)    Depression    Hypothyroidism    Hyperlipidemia    Kidney transplanted    Hypertension    ANGELIKA (obstructive sleep apnea)    Peripheral neuropathy    Shoulder fracture      MEDICATIONS  (STANDING):  amLODIPine   Tablet 10 milliGRAM(s) Oral daily  ascorbic acid 500 milliGRAM(s) Oral two times a day  aspirin enteric coated 81 milliGRAM(s) Oral daily  azaTHIOprine 50 milliGRAM(s) Oral two times a day  buPROPion XL (24-Hour) . 300 milliGRAM(s) Oral daily  carvedilol 12.5 milliGRAM(s) Oral every 12 hours  dextrose 5%. 1000 milliLiter(s) (100 mL/Hr) IV Continuous <Continuous>  dextrose 5%. 1000 milliLiter(s) (50 mL/Hr) IV Continuous <Continuous>  dextrose 50% Injectable 25 Gram(s) IV Push once  dextrose 50% Injectable 12.5 Gram(s) IV Push once  dextrose 50% Injectable 25 Gram(s) IV Push once  ertapenem  IVPB      ertapenem  IVPB 1000 milliGRAM(s) IV Intermittent every 24 hours  escitalopram 10 milliGRAM(s) Oral <User Schedule>  ferrous    sulfate 325 milliGRAM(s) Oral two times a day  glucagon  Injectable 1 milliGRAM(s) IntraMuscular once  hydrALAZINE 100 milliGRAM(s) Oral three times a day  insulin glargine Injectable (LANTUS) 20 Unit(s) SubCutaneous at bedtime  insulin lispro (ADMELOG) corrective regimen sliding scale   SubCutaneous three times a day before meals  levothyroxine 88 MICROGram(s) Oral <User Schedule>  lisinopril 40 milliGRAM(s) Oral daily  mineral oil enema 133 milliLiter(s) Rectal once  multivitamin/minerals/iron Oral Solution (CENTRUM) 15 milliLiter(s) Oral daily  pantoprazole    Tablet 40 milliGRAM(s) Oral two times a day  polyethylene glycol 3350 17 Gram(s) Oral daily  pyridoxine 50 milliGRAM(s) Oral daily  rosuvastatin 10 milliGRAM(s) Oral at bedtime  senna 2 Tablet(s) Oral at bedtime  sodium chloride 0.45%. 1000 milliLiter(s) (50 mL/Hr) IV Continuous <Continuous>  tacrolimus 1 milliGRAM(s) Oral at bedtime  tacrolimus 2 milliGRAM(s) Oral daily    MEDICATIONS  (PRN):  acetaminophen   IVPB .. 1000 milliGRAM(s) IV Intermittent every 8 hours PRN Temp greater or equal to 38C (100.4F)  aluminum hydroxide/magnesium hydroxide/simethicone Suspension 30 milliLiter(s) Oral every 4 hours PRN Dyspepsia  dextrose Oral Gel 15 Gram(s) Oral once PRN Blood Glucose LESS THAN 70 milliGRAM(s)/deciliter  hydrALAZINE Injectable 10 milliGRAM(s) IV Push every 8 hours PRN SBP >180 and HR 60-70bpm  melatonin 3 milliGRAM(s) Oral at bedtime PRN Insomnia  metoprolol tartrate Injectable 5 milliGRAM(s) IV Push every 6 hours PRN SBP >170  ondansetron Injectable 4 milliGRAM(s) IV Push every 8 hours PRN Nausea and/or Vomiting    T(C): 36.8 (12-06-24 @ 10:30), Max: 37.2 (12-04-24 @ 19:13)  HR: 76 (12-06-24 @ 10:30) (71 - 80)  BP: 150/64 (12-06-24 @ 10:30) (144/67 - 197/70)  RR: 18 (12-06-24 @ 04:25)  SpO2: 94% (12-06-24 @ 04:25)  Wt(kg): --  I&O's Detail    05 Dec 2024 07:01  -  06 Dec 2024 07:00  --------------------------------------------------------  IN:  Total IN: 0 mL    OUT:    Voided (mL): 450 mL  Total OUT: 450 mL    Total NET: -450 mL        PHYSICAL EXAM:  General: No distress  Respiratory: b/l air entry  Cardiovascular: S1 S2  Gastrointestinal: soft  Extremities:  no edema        LABORATORY:                        7.8    4.90  )-----------( 339      ( 06 Dec 2024 06:50 )             23.9     12-06    136  |  103  |  19  ----------------------------<  276[H]  4.0   |  30  |  0.77    Ca    11.1[H]      06 Dec 2024 06:50      Sodium: 136 mmol/L (12-06 @ 06:50)  Sodium: 137 mmol/L (12-05 @ 10:00)    Potassium: 4.0 mmol/L (12-06 @ 06:50)  Potassium: 3.8 mmol/L (12-05 @ 10:00)    Hemoglobin: 7.8 g/dL (12-06 @ 06:50)  Hemoglobin: 7.8 g/dL (12-05 @ 10:00)  Hemoglobin: 6.6 g/dL (12-04 @ 16:06)  Hemoglobin: 6.7 g/dL (12-04 @ 10:46)    Creatinine, Serum 0.77 (12-06 @ 06:50)  Creatinine, Serum 0.84 (12-05 @ 10:00)  Creatinine, Serum 0.90 (12-04 @ 09:16)          Urinalysis Basic - ( 06 Dec 2024 06:50 )    Color: x / Appearance: x / SG: x / pH: x  Gluc: 276 mg/dL / Ketone: x  / Bili: x / Urobili: x   Blood: x / Protein: x / Nitrite: x   Leuk Esterase: x / RBC: x / WBC x   Sq Epi: x / Non Sq Epi: x / Bacteria: x

## 2024-12-05 NOTE — OCCUPATIONAL THERAPY INITIAL EVALUATION ADULT - RANGE OF MOTION EXAMINATION, UPPER EXTREMITY
Muscle strength UE's: grossly 3/5 b/l shoulders, rest of UE's 3+/5 t/o. Fine motor skills: WFL's-impaired/bilateral UE Active Assistive ROM was WFL  (within functional limits)

## 2024-12-05 NOTE — PHYSICAL THERAPY INITIAL EVALUATION ADULT - PERTINENT HX OF CURRENT PROBLEM, REHAB EVAL
69-year-old male with history of DM, ESRD s/o kidney transplant, depression, hypertension, anxiety, hyperlipidemia, hypothyroidism, liver cell CA, anemia bibems from [facility] for ams, decreased responsiveness and chills. pt unable to provide much history but responds to name and denies pain, found to be febrile in ED

## 2024-12-05 NOTE — OCCUPATIONAL THERAPY INITIAL EVALUATION ADULT - PERTINENT HX OF CURRENT PROBLEM, REHAB EVAL
69 year-old male with history of DM, ESRD s/o kidney transplant, depression, hypertension, anxiety, hyperlipidemia, hypothyroidism, liver cell CA, anemia bibems from [facility] for ams, decreased responsiveness and chills. Pt unable to provide much history but responds to name and denies pain, found to be febrile in ED. MR pelvis 12/3/24: bilateral sacral ala fractures with marked osseous edema and marked loss of normal T1 fatty marrow osteomyelitis or soft tissue tumor. Pt admitted 11/29/24 with sepsis and ESBL bacteremia due to acute cystitis,

## 2024-12-05 NOTE — PROGRESS NOTE ADULT - SUBJECTIVE AND OBJECTIVE BOX
Patient is a 69y old  Male who presents with a chief complaint of AMS (05 Dec 2024 11:48)      INTERVAL HPI/OVERNIGHT EVENTS:  pt seen and examine  today   more awake  weak , c/o constipation      T(C): 36.8 (12-05-24 @ 11:07), Max: 37.2 (12-04-24 @ 19:13)  HR: 77 (12-05-24 @ 11:07) (71 - 77)  BP: 148/62 (12-05-24 @ 13:39) (148/62 - 197/70)  RR: 18 (12-05-24 @ 04:24) (18 - 20)  SpO2: 97% (12-05-24 @ 11:07) (94% - 97%)  Wt(kg): --  I&O's Summary    04 Dec 2024 07:01  -  05 Dec 2024 07:00  --------------------------------------------------------  IN: 922 mL / OUT: 2300 mL / NET: -1378 mL      REVIEW OF SYSTEMS:  CONSTITUTIONAL: No fever, weight loss, + fatigue  EYES: No eye pain, visual disturbances, or discharge  ENMT:  No difficulty hearing, tinnitus, vertigo; No sinus or throat pain  NECK: No pain or stiffness  RESPIRATORY: No cough, wheezing, chills ; No shortness of breath  CARDIOVASCULAR: No chest pain, palpitations, dizziness, or leg swelling  GASTROINTESTINAL: No abdominal or epigastric pain. No nausea, vomiting,  No diarrhea or constipation. No melena or hematochezia.  GENITOURINARY: No dysuria, frequency, hematuria, or incontinence  NEUROLOGICAL: No headaches, memory loss, loss of strength, numbness, or tremors  SKIN: No itching, burning, rashes, or lesions   MUSCULOSKELETAL: No joint pain or swelling; No muscle, back, or extremity pain    PHYSICAL EXAM:  GENERAL: NAD,  weak   HEAD:  Atraumatic, Normocephalic  EYES: EOMI, PERRLA, conjunctiva and sclera clear  ENMT t; Moist mucous membranes  NECK: Supple, No JVD  NERVOUS SYSTEM:  Alert & Oriented X3; Motor Strength 5/5 B/L upper and lower extremities; DTRs 2+ intact and symmetric  CHEST/LUNG:   percussion bilaterally; No rales, rhonchi, wheezing,   HEART: Regular rate and rhythm; No murmurs,   ABDOMEN: Soft, Nontender, Nondistended; Bowel sounds present  EXTREMITIES:  2+ Peripheral Pulses, No clubbing, cyanosis, or edema  SKIN: No rashes or lesions / picc line intact     MEDICATIONS  (STANDING):  amLODIPine   Tablet 10 milliGRAM(s) Oral daily  ascorbic acid 500 milliGRAM(s) Oral two times a day  aspirin enteric coated 81 milliGRAM(s) Oral daily  azaTHIOprine 50 milliGRAM(s) Oral two times a day  buPROPion XL (24-Hour) . 300 milliGRAM(s) Oral daily  calcitonin Injectable 310 International Unit(s) IntraMuscular every 12 hours  carvedilol 12.5 milliGRAM(s) Oral every 12 hours  dextrose 5%. 1000 milliLiter(s) (100 mL/Hr) IV Continuous <Continuous>  dextrose 5%. 1000 milliLiter(s) (50 mL/Hr) IV Continuous <Continuous>  dextrose 50% Injectable 25 Gram(s) IV Push once  dextrose 50% Injectable 12.5 Gram(s) IV Push once  dextrose 50% Injectable 25 Gram(s) IV Push once  ertapenem  IVPB      ertapenem  IVPB 1000 milliGRAM(s) IV Intermittent every 24 hours  escitalopram 10 milliGRAM(s) Oral <User Schedule>  ferrous    sulfate 325 milliGRAM(s) Oral two times a day  glucagon  Injectable 1 milliGRAM(s) IntraMuscular once  hydrALAZINE 100 milliGRAM(s) Oral three times a day  insulin glargine Injectable (LANTUS) 20 Unit(s) SubCutaneous at bedtime  insulin lispro (ADMELOG) corrective regimen sliding scale   SubCutaneous three times a day before meals  levothyroxine 88 MICROGram(s) Oral <User Schedule>  lisinopril 40 milliGRAM(s) Oral daily  mineral oil enema 133 milliLiter(s) Rectal once  multivitamin/minerals/iron Oral Solution (CENTRUM) 15 milliLiter(s) Oral daily  pantoprazole    Tablet 40 milliGRAM(s) Oral two times a day  polyethylene glycol 3350 17 Gram(s) Oral daily  pyridoxine 50 milliGRAM(s) Oral daily  rosuvastatin 10 milliGRAM(s) Oral at bedtime  senna 2 Tablet(s) Oral at bedtime  sodium chloride 0.45%. 1000 milliLiter(s) (50 mL/Hr) IV Continuous <Continuous>  tacrolimus 2 milliGRAM(s) Oral daily  tacrolimus 1 milliGRAM(s) Oral at bedtime    MEDICATIONS  (PRN):  acetaminophen   IVPB .. 1000 milliGRAM(s) IV Intermittent every 8 hours PRN Temp greater or equal to 38C (100.4F)  aluminum hydroxide/magnesium hydroxide/simethicone Suspension 30 milliLiter(s) Oral every 4 hours PRN Dyspepsia  dextrose Oral Gel 15 Gram(s) Oral once PRN Blood Glucose LESS THAN 70 milliGRAM(s)/deciliter  hydrALAZINE Injectable 10 milliGRAM(s) IV Push every 8 hours PRN SBP >180 and HR 60-70bpm  melatonin 3 milliGRAM(s) Oral at bedtime PRN Insomnia  metoprolol tartrate Injectable 5 milliGRAM(s) IV Push every 6 hours PRN SBP >170  ondansetron Injectable 4 milliGRAM(s) IV Push every 8 hours PRN Nausea and/or Vomiting      LABS:                        7.8    5.83  )-----------( 298      ( 05 Dec 2024 10:00 )             23.7     12-05    137  |  103  |  16  ----------------------------<  144[H]  3.8   |  25  |  0.84    Ca    10.6[H]      05 Dec 2024 10:00        Urinalysis Basic - ( 05 Dec 2024 10:00 )    Color: x / Appearance: x / SG: x / pH: x  Gluc: 144 mg/dL / Ketone: x  / Bili: x / Urobili: x   Blood: x / Protein: x / Nitrite: x   Leuk Esterase: x / RBC: x / WBC x   Sq Epi: x / Non Sq Epi: x / Bacteria: x      CAPILLARY BLOOD GLUCOSE      POCT Blood Glucose.: 146 mg/dL (05 Dec 2024 11:20)  POCT Blood Glucose.: 213 mg/dL (05 Dec 2024 07:52)  POCT Blood Glucose.: 295 mg/dL (04 Dec 2024 21:11)  POCT Blood Glucose.: 265 mg/dL (04 Dec 2024 16:52)              RADIOLOGY & ADDITIONAL TESTS:    Imaging Personally Reviewed:   no new test

## 2024-12-05 NOTE — PROGRESS NOTE ADULT - ASSESSMENT
Patient is a 70yo M, PMH DM, HTN, HLD, h/o kidney transplant, hypothyroidism, presents to ED from [facility] for AMS likely  acute  metabolic encephalopathy       AMS 2/2 Sepsis 2/2 multiple infections (UTI, sacral infection, possible osteomyelitis), E coli bacteremia  cause of acute  metabolic encephalopathy   Sepsis 2/2 ESBL Ecoli UTI and bacteremia. sensitive to ertapenem.  - C/W  Meropenum 1gm q24h x10 day course until 12/10 d/w  ID Dr. Saul with  midline   -Tylenol PRN pain and fever- diet as tolerated   - aspiration and fall risk - Continue Senna, Miralax, PRN dulcolax suppositories   - MR pelvis/sacrum to eval for osteomyelitis- last    Again seen are comminuted bilateral sacral lona fractures with marked   osseous edema and marked loss of normal T1 fatty marrow. Osseous edema   with loss of normal T1 fatty marrow at the caudal aspect of the left   posterior iliac bone adjacent to the sacroiliac joint. Previously seen   fracture component is better visualized on CT. These findings could be   secondary to extensive fracture deformities in an osteopenic patient   versus osteomyelitis if there was a history of a soft tissue ulcer   extending to bone versus metastatic disease given the marked loss of T1   fatty marrow if there is a history of malignancy. Clinical correlation   with patient history is advised.  - Per Ortho no surgical intervention for fracture of sacrum      Acute on Chronic anemia :  - Likely due to infection,  and CKD- No signs of bleeding noted    - Iron22/ sat low    iron deficiency with chr anemia  , fu occult blood  - no bm   -- D/w Hem Dr. Sal  With prior renal transplant would use irradiated PRBC and try to keep transfusions to a minimum.     s/p   1 unit prbc - hb stable   Hepatic lesion  CT abd with 5.5cm lesion on R hepatic lobe  wife known mass it benin     Diabetes Mellitus  Diet  Pre meal Insulin   Lantus to 20 u QHS (from home dose 35u, increase as tolerated according to fingerstick).  fingerstick glucose monitoring Q6h  ISS A1c 7.4 - blood sugar stable     HTN: Uncontrolled   Continue Lisinopril 20mg  increase to 40 mg  daily with hold parameters  Continue Hydralazine 100mg TID with hold parameters  Continue Coreg, switched from Metoprolol - 12.5 mg po bid  Continue Amlodipine 10mg daily - fu bp closely     HLD  Continue Crestor 10mg daily    s/p Kidney transplant/CKD 3  Low mag and PO4, replaced  Continue Tacrolimus 2mg daily  Continue Tacrolimus 1mg QHS  Continue Azathioprine 50mg BID  Nephrology consulted dr vizcarra     Hypothyroidism  Continue Levothyroxine 88mcg QAM     hypercalcemia was possible  sec to vit D   on calcitonin - fu ca  in am   Depression  Continue Escitalopram 10mg TID  Continue Bupropion 300mg daily    liver mass and spleen mass as per radiologist     DVT ppx: Hold AC due to anemia and risk of bleeding       sept 27 / 24  aware  about liver mass / spleen  mass was diagnosed at [facility] -  liver   biopsy was  done was benign    wife  bedside  aware tt/plan   will bring report.       Dispo: DC  d[place]  24 hr to 48hr

## 2024-12-05 NOTE — OCCUPATIONAL THERAPY INITIAL EVALUATION ADULT - ADDITIONAL COMMENTS
Pt unable to provide social info. As per spouse; patient lives in a house with no stairs to enter and +stall shower with grab bars. Pt admitted from [facility]; prior to hospitalization Sept 2024 patient ambulated using a rolling walker and able to perform ADLs independently with occasional assistance with socks and shoes. Pt owns a rolling walker and shower chair with back. Pt performed sit to stand and ambulated 3' from bed to chair using RW with mod assist x 2 and +posterior lean noted. As per spouse; patient was ambulating using RW at [facility] with assistance.

## 2024-12-05 NOTE — PROGRESS NOTE ADULT - ASSESSMENT
IMPRESSION  Anemia multifactorial in etiology related to prior renal transplant with meds and now with osteomyelitis and sepsis with Gram neg maury sepsis  Blood and urine culture with E. Coli  Hgb 6.8==>7.3 ==>7.1 g/dL after receiving 1 unit PRBC  expect to be transfusion dependent with active infection    Ferritin 885, iron 22, TIBC 164, sat 14%  PSA 12.3    ,     5cm liver mass  Prior known, s/p liver bx at [facility], reportedly benign, not corroborated    MGUS  IgM-L  hx of splenomegaly 14cm since 2018    RECOMMENDATIONS    Anemia  Follow CBC and transfuse as indicated  recommend conservative mgmt of anemia.   With prior renal transplant would use irradiated PRBC and try to keep transfusions to a minimum.     MGUS and hypercalcemia  VitD1,25OH 102.0, VitD 39.2  Continue renal evaluation.   Note calcium increased 11.7==>11.2==>10.6  discussed with Dr. Bruce  Start calcitonin x48hr    Liver mass  Await imaging reports and path report from [facility]  wife to bring in reports tomorrow    Osteomyelitis  ID evaluation and management  Orthopedic followup    d/w Dr Upton

## 2024-12-05 NOTE — PROGRESS NOTE ADULT - ASSESSMENT
CKD 3, h/o Kidney transplant ~2012, h/o Left Nephrectomy  Diabetes  Hypertension  Sepsis, UTI, Sacral osteomyelitis, Gram negative bacteremia  Hypokalemia  Hypercalcemia, Elevated 1,25 Vitamin D  Hypomagnesemia  Hypophosphatemia  Anemia  + Liver mass    11/30/24: IV hydration. Potassium and magnesium supplementation. Work up for hypercalcemia as ordered. To continue preadmission immuno suppressants.   Monitor blood sugar levels. Insulin coverage as needed. Dietary restriction. Trend BP and titrate BP meds as needed.   Avoid nephrotoxic meds as possible. IV abx, ID follow up. Will follow electrolytes and renal function trend.   12/01/24: Lower IVF. Trend BP and titrate BP meds as needed. To continue current meds. Phosphorous supplementation. IV abx, ID follow up. Discussed with patients wife at the bedside.   12/02/24: Stable renal indices. To continue current meds. Magnesium and phos supps. May need PRBC tx if no improvement. Will follow electrolytes and renal function trend.   12/03/24: Renal indices remain stable. PRBC transfusion. Avoid nephrotoxic meds as possible. Potassium supplementation. Work up for liver mass.   12/04/24: Stable renal indices. MRI results noted. To continue current meds. Monitor h/h trend. Transfuse PRBC's PRN.   12/05/24: Renal indices stable. Elevated 1,25 vitamin D. Exogenous Vitamin D stopped. CT result noted. + RP LN. D/w hematology. Awaiting out pt biopsy results which pts wife will bring in.  Trend BP and titrate BP meds as needed.

## 2024-12-05 NOTE — OCCUPATIONAL THERAPY INITIAL EVALUATION ADULT - GENERAL OBSERVATIONS, REHAB EVAL
Home
Patient received in bed with +external catheter. Patient chart reviewed, events noted. Patient cleared to be seen for therapy by RN prior to session.

## 2024-12-05 NOTE — PROGRESS NOTE ADULT - ASSESSMENT
68yo M, PMH DM, HTN, HLD, h/o kidney transplant, hypothyroidism, presents to ED from [facility] for AMS, found to be febrile with positive UA. 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 Cardiology called for pericardial effusion.     Uncontrolled HTN/Pericardial Effusion  - CT chest: Small left pleural effusion with small loculated components, Small to moderate pericardial effusion  - TTE showed EF 60%, trace pericardial effusion adjacent ot the posterior LV.  No significant valvular disease  - Pt w/no signs of tamponade on examination    - No evidence of any meaningful volume overload   - IV fluids as needed.      - EKG with nonspecific TWI anteriorly, repeat unchanged  - No evidence of any active ischemia   - Continue statin   - In the setting of persistent anemia (neg FOBT), with Hgb 6-7, would D/C ASA if no clear indication    - BP uncontrolled 140-190's, though, may not be as accurate as it is being obtained on LE  - Continue Norvasc 10, Coreg 12.5, Hydralazine 10 mg q8H, Lisinopril 20 daily  - Would up titrate Lisinopril to 40 mg daily or 20 mg q12H    - Heme and GI following for anmeia  - Ortho following for pathological sacral Fx with possible OM.  No acute orthopedic surgical intervention at this time.  - Abx per ID     - Monitor and replete lytes, keep K>4, Mg>2.  - Will continue to follow.    Oralia Mendoza DNP, NP-C, AGACNP-C  Cardiology   Call TEAMS

## 2024-12-05 NOTE — OCCUPATIONAL THERAPY INITIAL EVALUATION ADULT - DIAGNOSIS, OT EVAL
Patient with decreased ADL status and impairments with functional mobility. Patient requires assistance with ADL's and transfers due to decreased strength, decreased motor control, impaired cognition, decreased endurance and impaired sitting/standing balance.

## 2024-12-05 NOTE — CHART NOTE - NSCHARTNOTEFT_GEN_A_CORE
Patient seen and examined at bedside. Patient's mental status has improved since when patient was originally seen. Patient able to answer minimal questions and follow simple commands. Denies any back pain, numbness or tingling.     Physical Exam:  Gen: lying comfortably, in NAD    Motor:                   C5                C6              C7               C8           T1   R            5/5                5/5            5/5             5/5          5/5  L             5/5               5/5             5/5             5/5          5/5                L2             L3             L4               L5            S1  R         5/5           5/5          5/5             5/5           5/5  L          5/5          5/5           5/5             5/5           5/5    Sensory:            C5         C6         C7      C8       T1        (0=absent, 1=impaired, 2=normal, NT=not testable)  R         2            2           2        2         2  L          2            2           2        2         2               L2          L3         L4      L5       S1         (0=absent, 1=impaired, 2=normal, NT=not testable)  R         2            2            2        2        2  L          2            2           2        2         2    Negative Lazo, Babinski, and clonus bilaterally  Radial 2+ bilaterally  DP 2+ bilaterally       Patient's exam reassuring in addition to previous imaging findings. No concern for H or U fracture. No neurological deficits on exam, low suspicion for cauda equina syndrome. Continue recommending IV antibiotics per ID, WBAT, follow up with Dr. Bunn after discharge. Recommend continuing workup for possible malignancy. Please reconsult us with any further clinical concerns.    Discussed with Dr. Bunn who is in agreement with the above plan.

## 2024-12-05 NOTE — PROGRESS NOTE ADULT - SUBJECTIVE AND OBJECTIVE BOX
[facility] -- Tom Rahman, Osorio Castillo Savella, , Lisa Covington  Office # [phone]    Follow Up:  Uncontrolled HTN    Subjective/Observations: Awake but confused.  Comfortable on RA.  Not in any form of distress    REVIEW OF SYSTEMS: All other review of systems is negative unless indicated above  PAST MEDICAL & SURGICAL HISTORY:  Diabetes Mellitus Type II  Insulin pump (2010)      GERD (gastroesophageal reflux disease)      Depression      Hypothyroidism      Hyperlipidemia      Kidney transplanted  2012      Hypertension      ANGELIKA (obstructive sleep apnea)      Peripheral neuropathy      Shoulder fracture  Left.      History of colonoscopy      S/P kidney transplant  2012      S/P cholecystectomy      Retroperitoneal fibrosis  s/p surgery      AV fistula  Right arm      S/P right cataract extraction      S/P left cataract extraction      H/O unilateral nephrectomy  Left        MEDICATIONS  (STANDING):  amLODIPine   Tablet 10 milliGRAM(s) Oral daily  ascorbic acid 500 milliGRAM(s) Oral two times a day  aspirin enteric coated 81 milliGRAM(s) Oral daily  azaTHIOprine 50 milliGRAM(s) Oral two times a day  buPROPion XL (24-Hour) . 300 milliGRAM(s) Oral daily  calcitonin Injectable 310 International Unit(s) IntraMuscular every 12 hours  carvedilol 12.5 milliGRAM(s) Oral every 12 hours  dextrose 5%. 1000 milliLiter(s) (100 mL/Hr) IV Continuous <Continuous>  dextrose 5%. 1000 milliLiter(s) (50 mL/Hr) IV Continuous <Continuous>  dextrose 50% Injectable 25 Gram(s) IV Push once  dextrose 50% Injectable 12.5 Gram(s) IV Push once  dextrose 50% Injectable 25 Gram(s) IV Push once  ertapenem  IVPB      ertapenem  IVPB 1000 milliGRAM(s) IV Intermittent every 24 hours  escitalopram 10 milliGRAM(s) Oral <User Schedule>  ferrous    sulfate 325 milliGRAM(s) Oral two times a day  glucagon  Injectable 1 milliGRAM(s) IntraMuscular once  hydrALAZINE 100 milliGRAM(s) Oral three times a day  insulin glargine Injectable (LANTUS) 20 Unit(s) SubCutaneous at bedtime  insulin lispro (ADMELOG) corrective regimen sliding scale   SubCutaneous three times a day before meals  levothyroxine 88 MICROGram(s) Oral <User Schedule>  lisinopril 20 milliGRAM(s) Oral daily  mineral oil enema 133 milliLiter(s) Rectal once  multivitamin/minerals/iron Oral Solution (CENTRUM) 15 milliLiter(s) Oral daily  pantoprazole    Tablet 40 milliGRAM(s) Oral two times a day  polyethylene glycol 3350 17 Gram(s) Oral daily  pyridoxine 50 milliGRAM(s) Oral daily  rosuvastatin 10 milliGRAM(s) Oral at bedtime  senna 2 Tablet(s) Oral at bedtime  sodium chloride 0.45%. 1000 milliLiter(s) (50 mL/Hr) IV Continuous <Continuous>  tacrolimus 2 milliGRAM(s) Oral daily  tacrolimus 1 milliGRAM(s) Oral at bedtime    MEDICATIONS  (PRN):  acetaminophen   IVPB .. 1000 milliGRAM(s) IV Intermittent every 8 hours PRN Temp greater or equal to 38C (100.4F)  aluminum hydroxide/magnesium hydroxide/simethicone Suspension 30 milliLiter(s) Oral every 4 hours PRN Dyspepsia  dextrose Oral Gel 15 Gram(s) Oral once PRN Blood Glucose LESS THAN 70 milliGRAM(s)/deciliter  hydrALAZINE Injectable 10 milliGRAM(s) IV Push every 8 hours PRN SBP >180 and HR 60-70bpm  melatonin 3 milliGRAM(s) Oral at bedtime PRN Insomnia  metoprolol tartrate Injectable 5 milliGRAM(s) IV Push every 6 hours PRN SBP >170  ondansetron Injectable 4 milliGRAM(s) IV Push every 8 hours PRN Nausea and/or Vomiting    Allergies    No Known Allergies    Intolerances      Vital Signs Last 24 Hrs  T(C): 36.8 (05 Dec 2024 11:07), Max: 37.2 (04 Dec 2024 19:13)  T(F): 98.3 (05 Dec 2024 11:07), Max: 99 (04 Dec 2024 19:13)  HR: 77 (05 Dec 2024 11:07) (71 - 77)  BP: 197/70 (05 Dec 2024 11:07) (160/60 - 197/70)  BP(mean): --  RR: 18 (05 Dec 2024 04:24) (18 - 20)  SpO2: 97% (05 Dec 2024 11:07) (94% - 97%)    Parameters below as of 05 Dec 2024 11:07  Patient On (Oxygen Delivery Method): room air      I&O's Summary    04 Dec 2024 07:01  -  05 Dec 2024 07:00  --------------------------------------------------------  IN: 922 mL / OUT: 2300 mL / NET: -1378 mL        PHYSICAL EXAM:  TELE: Not on tele  Constitutional: NAD, awake and alert  HEENT: Moist Mucous Membranes, Anicteric  Pulmonary: Non-labored, breath sounds are clear bilaterally, No wheezing, rales or rhonchi  Cardiovascular: Regular, S1 and S2, No murmurs, rubs, gallops or clicks  Gastrointestinal: Bowel Sounds present, soft, nontender.   Lymph: No peripheral edema. No lymphadenopathy.  Skin: No visible rashes or ulcers.  Psych:  Mood & affect: Flat, pleasantly confused  LABS: All Labs Reviewed:                        7.8    5.83  )-----------( 298      ( 05 Dec 2024 10:00 )             23.7                         6.6    x     )-----------( x        ( 04 Dec 2024 16:06 )             20.7                         6.7    x     )-----------( x        ( 04 Dec 2024 10:46 )             21.1     05 Dec 2024 10:00    137    |  103    |  16     ----------------------------<  144    3.8     |  25     |  0.84   04 Dec 2024 09:16    137    |  103    |  20     ----------------------------<  318    4.2     |  27     |  0.90   03 Dec 2024 06:05    139    |  105    |  19     ----------------------------<  196    3.5     |  30     |  0.78     Ca    10.6       05 Dec 2024 10:00  Ca    10.6       04 Dec 2024 09:16  Ca    10.8       03 Dec 2024 06:05  Phos  2.9       03 Dec 2024 06:05  Phos  1.9       02 Dec 2024 11:50  Mg     1.4       03 Dec 2024 06:05  Mg     1.5       02 Dec 2024 11:50    TPro  6.7    /  Alb  2.1    /  TBili  0.4    /  DBili  x      /  AST  56     /  ALT  55     /  AlkPhos  118    02 Dec 2024 11:50    12 Lead ECG:   Ventricular Rate 81 BPM    Atrial Rate 81 BPM    P-R Interval 148 ms    QRS Duration 104 ms    Q-T Interval 406 ms    QTC Calculation(Bazett) 471 ms    P Axis 44 degrees    R Axis 12 degrees    T Axis 53 degrees    Diagnosis Line Normal sinus rhythm  Normal ECG  When compared with ECG of 29-NOV-2024 07:11,  Nonspecific T wave abnormality, improved in Lateral leads  Confirmed by Kya Gonzalez ([id number]) on 12/1/2024 10:43:18 AM (12-01-24 @ 09:28)      TRANSTHORACIC ECHOCARDIOGRAM REPORT  ________________________________________________________________________________                                      _______       Pt. Name:       JAN CALVIN Study Date:    11/29/2024  MRN:            [id number]          YOB: 1955  Accession #:    [id number]         Age:           69 years  Account#:       [id number]        Gender:        M  Heart Rate:                       Height:        64.17 in (163.00 cm)  Rhythm:       Weight:        169.75 lb (77.00 kg)  Blood Pressure: 196/81 mmHg       BSA/BMI:       1.83 m² / 28.98 kg/m²  ________________________________________________________________________________________  Referring Physician:    [id number] Ale Ibrahim  Interpreting Physician: Kya Gonzalez MD  Primary Sonographer:    Butch Salazar    CPT:               ECHO TTE WO CON COMP W DOPP - 67952.m  Indication(s):     Cardiac murmur, unspecified - R01.1  Procedure:         Transthoracic echocardiogram with 2-D, M-mode and complete                     spectral and color flow Doppler.  Ordering Location: [facility]  Admission Status:  ED    _______________________________________________________________________________________     CONCLUSIONS:      1. Left ventricular cavity is normal in size. Left ventricular systolic function is normal with an ejection fraction of 60 % by Moreno's method of disks.   2. Trace pericardial effusion noted adjacent to the posterior left ventricle.   3. Normal right ventricular cavity size and normal right ventricular systolic function.   4. Aortic valve anatomy cannot be determined with normal systolic excursion. There is calcification of the aortic valve leaflets.   5. Structurally normal mitral valve with normalleaflet excursion.   6. Structurally normal tricuspid valve with normal leaflet excursion. Trace tricuspid regurgitation.   7. The inferior vena cava is normal in size measuring 1.36 cm in diameter, (normal <2.1cm) with normal inspiratory collapse (normal >50%) consistent with normal right atrial pressure (~3, range 0-5mmHg).  ________________________________________________________________________________________  FINDINGS:     Left Ventricle:  The left ventricular cavity is normal in size. Left ventricular systolic function is normal with a calculated ejection fraction of 60 % by the Moreno's biplane method of disks.     Right Ventricle:  The right ventricular cavity is normal in size and right ventricular systolic function is normal. Tricuspid annular plane systolic excursion (TAPSE) is 2.9 cm (normal >=1.7 cm).     Left Atrium:  The left atrium is normal in size with an indexed volume of 33.15 ml/m².     Right Atrium:  The right atrium is normal in size with an indexed volume of 21.17 ml/m².     Interatrial Septum:  The interatrial septum appears intact.     Aortic Valve:  The aortic valve anatomy cannot be determined with normal systolic excursion. There is calcification of the aortic valve leaflets. The peak transaortic velocity is 2.15 m/s, peak transaortic gradient is 18.5 mmHg and mean transaortic gradient is 11.0 mmHg with an LVOT/aortic valve VTI ratio of 0.64. The aortic valve area is estimated at 2.67 cm² by the continuity equation. There is no evidence of aortic regurgitation.     Mitral Valve:  Structurally normal mitral valve with normal leaflet excursion. There is calcification of the mitral valve annulus.     Tricuspid Valve:  Structurally normal tricuspid valve with normal leaflet excursion. There is trace tricuspid regurgitation. Estimated pulmonary artery systolic pressure is 8 mmHg.     Aorta:  The aortic root at the sinuses of Valsalva is normal in size, measuring 3.50 cm (indexed 1.91 cm/m²). The ascending aorta is dilated, measuring 4.10 cm (indexed 2.24 cm/m²).     Pericardium:  There is a trace pericardial effusion noted adjacent to the posterior left ventricle.     Systemic Veins:  The inferior vena cava is normal in size measuring 1.36 cm in diameter, (normal <2.1cm) with normal inspiratory collapse (normal >50%) consistent with normal right atrial pressure (~3, range 0-5mmHg).  ____________________________________________________________________  QUANTITATIVE DATA:  Left Ventricle Measurements: (Indexed to BSA)     IVSd (2D):   1.0 cm  LVPWd (2D):  1.0 cm  LVIDd (2D):  5.3 cm  LVIDs (2D):  3.6 cm  LV Mass:     203 g  111.2 g/m²  LV Vol d, MOD A2C: 97.8 ml  53.50 ml/m²  LV Vol d, MOD A4C: 121.0 ml 66.19 ml/m²  LV Vol d, MOD BP:  109.5 ml 59.90 ml/m²  LV Vol s, MOD A2C: 36.4 ml  19.91 ml/m²  LV Vol s, MOD A4C: 49.5 ml  27.08 ml/m²  LV Vol s, MOD BP:  44.0 ml  24.04 ml/m²  LVOT SV MOD BP:    65.5 ml  LV EF% MOD BP:     60 %     MV E Vmax:    1.02 m/s  MV A Vmax:    0.93 m/s  MV E/A:       1.10  e' lateral:   13.10 cm/s  e' medial:    9.25 cm/s  E/e' lateral: 7.79  E/e' medial:  11.03  E/e' Average: 9.13  MV DT:        151 msec    Aorta Measurements: (Normal range) (Indexed to BSA)     Ao Root d     3.50 cm (3.1 - 3.7 cm) 1.91 cm/m²  Ao Asc d, 2D: 4.10  Ao Asc prox:  4.10 cm               2.24 cm/m²            Left Atrium Measurements: (Indexed to BSA)  LA Diam 2D: 4.40 cm         Right Ventricle Measurements: Right Atrial Measurements:     TAPSE:            2.9 cm      RA Vol:            38.70 ml  RV Base (RVID1):  3.7cm      RA Vol s, MOD A4C  38.7 ml  RV Mid (RVID2):   3.1 cm      RA Vol Index:      21.17 ml/m²  RV Major (RVID3): 8.0 cm      RA Area s, MOD A4C 14.7 cm²       LVOT / RVOT/ Qp/Qs Data: (Indexed to BSA)  LVOT Diameter:  2.30 cm  LVOT Area:      4.15cm²  LVOT Vmax:      1.34 m/s  LVOT Vmn:       0.949 m/s  LVOT VTI:       26.30 cm  LVOT peak grad: 7 mmHg  LVOT mean grad: 4.0 mmHg  LVOT SV:        109.3 ml  59.78 ml/m²    Aortic Valve Measurements:  AV Vmax:                2.2 m/s  AV Peak Gradient:       18.5 mmHg  AV Mean Gradient:       11.0 mmHg  AV VTI:                 41.0 cm  AV VTI Ratio:           0.64  AoV EOA, Contin:        2.67 cm²  AoV EOA, Contin i:      1.46 cm²/m²  AoV Dimensionless Index 0.64    Mitral Valve Measurements:     MV E Vmax: 1.0 m/s  MV A Vmax: 0.9 m/s  MV E/A:    1.1       Tricuspid Valve Measurements:     TR Vmax:          1.2 m/s  TR Peak Gradient: 5.3 mmHg  RA Pressure:      3 mmHg  PASP:             8 mmHg    ________________________________________________________________________________________  Electronically signed on 11/30/2024 at 1:57:15 PM by Kya Gonzalez MD         *** Final ***

## 2024-12-05 NOTE — PROGRESS NOTE ADULT - SUBJECTIVE AND OBJECTIVE BOX
Interval History:  remains weak  wife at bedside  Chart reviewed and events noted;   Overnight events:    MEDICATIONS  (STANDING):  amLODIPine   Tablet 10 milliGRAM(s) Oral daily  ascorbic acid 500 milliGRAM(s) Oral two times a day  aspirin enteric coated 81 milliGRAM(s) Oral daily  azaTHIOprine 50 milliGRAM(s) Oral two times a day  buPROPion XL (24-Hour) . 300 milliGRAM(s) Oral daily  calcitonin Injectable 310 International Unit(s) IntraMuscular every 12 hours  carvedilol 12.5 milliGRAM(s) Oral every 12 hours  dextrose 5%. 1000 milliLiter(s) (100 mL/Hr) IV Continuous <Continuous>  dextrose 5%. 1000 milliLiter(s) (50 mL/Hr) IV Continuous <Continuous>  dextrose 50% Injectable 25 Gram(s) IV Push once  dextrose 50% Injectable 12.5 Gram(s) IV Push once  dextrose 50% Injectable 25 Gram(s) IV Push once  ertapenem  IVPB      ertapenem  IVPB 1000 milliGRAM(s) IV Intermittent every 24 hours  escitalopram 10 milliGRAM(s) Oral <User Schedule>  ferrous    sulfate 325 milliGRAM(s) Oral two times a day  glucagon  Injectable 1 milliGRAM(s) IntraMuscular once  hydrALAZINE 100 milliGRAM(s) Oral three times a day  insulin glargine Injectable (LANTUS) 20 Unit(s) SubCutaneous at bedtime  insulin lispro (ADMELOG) corrective regimen sliding scale   SubCutaneous three times a day before meals  levothyroxine 88 MICROGram(s) Oral <User Schedule>  lisinopril 40 milliGRAM(s) Oral daily  mineral oil enema 133 milliLiter(s) Rectal once  multivitamin/minerals/iron Oral Solution (CENTRUM) 15 milliLiter(s) Oral daily  pantoprazole    Tablet 40 milliGRAM(s) Oral two times a day  polyethylene glycol 3350 17 Gram(s) Oral daily  pyridoxine 50 milliGRAM(s) Oral daily  rosuvastatin 10 milliGRAM(s) Oral at bedtime  senna 2 Tablet(s) Oral at bedtime  sodium chloride 0.45%. 1000 milliLiter(s) (50 mL/Hr) IV Continuous <Continuous>  tacrolimus 2 milliGRAM(s) Oral daily  tacrolimus 1 milliGRAM(s) Oral at bedtime    MEDICATIONS  (PRN):  acetaminophen   IVPB .. 1000 milliGRAM(s) IV Intermittent every 8 hours PRN Temp greater or equal to 38C (100.4F)  aluminum hydroxide/magnesium hydroxide/simethicone Suspension 30 milliLiter(s) Oral every 4 hours PRN Dyspepsia  dextrose Oral Gel 15 Gram(s) Oral once PRN Blood Glucose LESS THAN 70 milliGRAM(s)/deciliter  hydrALAZINE Injectable 10 milliGRAM(s) IV Push every 8 hours PRN SBP >180 and HR 60-70bpm  melatonin 3 milliGRAM(s) Oral at bedtime PRN Insomnia  metoprolol tartrate Injectable 5 milliGRAM(s) IV Push every 6 hours PRN SBP >170  ondansetron Injectable 4 milliGRAM(s) IV Push every 8 hours PRN Nausea and/or Vomiting      Vital Signs Last 24 Hrs  T(C): 36.7 (05 Dec 2024 21:24), Max: 36.9 (05 Dec 2024 04:24)  T(F): 98.1 (05 Dec 2024 21:24), Max: 98.5 (05 Dec 2024 04:24)  HR: 80 (05 Dec 2024 21:24) (75 - 80)  BP: 182/67 (05 Dec 2024 21:24) (148/62 - 197/70)  BP(mean): --  RR: 18 (05 Dec 2024 21:24) (18 - 18)  SpO2: 96% (05 Dec 2024 21:24) (94% - 97%)    Parameters below as of 05 Dec 2024 21:24  Patient On (Oxygen Delivery Method): room air        PHYSICAL EXAM  General: adult in NAD  HEENT: clear oropharynx, anicteric sclera, pink conjunctivae  Neck: supple  CV: normal S1S2 with no murmur rubs or gallops  Lungs: clear to auscultation, no wheezes, no rhales  Abdomen: soft non-tender non-distended, no hepato/splenomegaly  Ext: no clubbing cyanosis or edema  Skin: no rashes and no petichiae  Neuro: alert and oriented X3 no focal deficits      LABS:  CBC Full  -  ( 05 Dec 2024 10:00 )  WBC Count : 5.83 K/uL  RBC Count : 2.70 M/uL  Hemoglobin : 7.8 g/dL  Hematocrit : 23.7 %  Platelet Count - Automated : 298 K/uL  Mean Cell Volume : 87.8 fl  Mean Cell Hemoglobin : 28.9 pg  Mean Cell Hemoglobin Concentration : 32.9 g/dL  Auto Neutrophil # : 4.90 K/uL  Auto Lymphocyte # : 0.28 K/uL  Auto Monocyte # : 0.43 K/uL  Auto Eosinophil # : 0.05 K/uL  Auto Basophil # : 0.02 K/uL  Auto Neutrophil % : 85.4 %  Auto Lymphocyte % : 4.9 %  Auto Monocyte % : 7.5 %  Auto Eosinophil % : 0.9 %  Auto Basophil % : 0.3 %    12-05    137  |  103  |  16  ----------------------------<  144[H]  3.8   |  25  |  0.84    Ca    10.6[H]      05 Dec 2024 10:00          fe studies  Iron - Total Binding Capacity.: 182 ug/dL (12-05 @ 10:00)  Ferritin: 885 ng/mL (12-01 @ 05:55)  Iron - Total Binding Capacity.: 164 ug/dL (12-01 @ 05:55)      WBC trend  5.83 K/uL (12-05-24 @ 10:00)  4.17 K/uL (12-04-24 @ 09:16)  3.34 K/uL (12-03-24 @ 06:05)      Hgb trend  7.8 g/dL (12-05-24 @ 10:00)  6.6 g/dL (12-04-24 @ 16:06)  6.7 g/dL (12-04-24 @ 10:46)  6.3 g/dL (12-04-24 @ 09:16)  7.0 g/dL (12-03-24 @ 06:05)      plt trend  298 K/uL (12-05-24 @ 10:00)  224 K/uL (12-04-24 @ 09:16)  234 K/uL (12-03-24 @ 06:05)        RADIOLOGY & ADDITIONAL STUDIES:

## 2024-12-05 NOTE — PROGRESS NOTE ADULT - SUBJECTIVE AND OBJECTIVE BOX
[facility]  Kaz Bermeo PA-C  [address] [place] [address]  [phone]      INTERVAL HPI/OVERNIGHT EVENTS:  Pt s/e  Confused  Hgb noted    MEDICATIONS  (STANDING):  amLODIPine   Tablet 10 milliGRAM(s) Oral daily  ascorbic acid 500 milliGRAM(s) Oral two times a day  aspirin enteric coated 81 milliGRAM(s) Oral daily  azaTHIOprine 50 milliGRAM(s) Oral two times a day  buPROPion XL (24-Hour) . 300 milliGRAM(s) Oral daily  calcitonin Injectable 310 International Unit(s) IntraMuscular every 12 hours  carvedilol 12.5 milliGRAM(s) Oral every 12 hours  dextrose 5%. 1000 milliLiter(s) (100 mL/Hr) IV Continuous <Continuous>  dextrose 5%. 1000 milliLiter(s) (50 mL/Hr) IV Continuous <Continuous>  dextrose 50% Injectable 25 Gram(s) IV Push once  dextrose 50% Injectable 12.5 Gram(s) IV Push once  dextrose 50% Injectable 25 Gram(s) IV Push once  ertapenem  IVPB      ertapenem  IVPB 1000 milliGRAM(s) IV Intermittent every 24 hours  escitalopram 10 milliGRAM(s) Oral <User Schedule>  ferrous    sulfate 325 milliGRAM(s) Oral two times a day  glucagon  Injectable 1 milliGRAM(s) IntraMuscular once  hydrALAZINE 100 milliGRAM(s) Oral three times a day  insulin glargine Injectable (LANTUS) 20 Unit(s) SubCutaneous at bedtime  insulin lispro (ADMELOG) corrective regimen sliding scale   SubCutaneous three times a day before meals  levothyroxine 88 MICROGram(s) Oral <User Schedule>  lisinopril 20 milliGRAM(s) Oral daily  mineral oil enema 133 milliLiter(s) Rectal once  multivitamin/minerals/iron Oral Solution (CENTRUM) 15 milliLiter(s) Oral daily  pantoprazole    Tablet 40 milliGRAM(s) Oral two times a day  polyethylene glycol 3350 17 Gram(s) Oral daily  pyridoxine 50 milliGRAM(s) Oral daily  rosuvastatin 10 milliGRAM(s) Oral at bedtime  senna 2 Tablet(s) Oral at bedtime  sodium chloride 0.45%. 1000 milliLiter(s) (50 mL/Hr) IV Continuous <Continuous>  tacrolimus 1 milliGRAM(s) Oral at bedtime  tacrolimus 2 milliGRAM(s) Oral daily    MEDICATIONS  (PRN):  acetaminophen   IVPB .. 1000 milliGRAM(s) IV Intermittent every 8 hours PRN Temp greater or equal to 38C (100.4F)  aluminum hydroxide/magnesium hydroxide/simethicone Suspension 30 milliLiter(s) Oral every 4 hours PRN Dyspepsia  dextrose Oral Gel 15 Gram(s) Oral once PRN Blood Glucose LESS THAN 70 milliGRAM(s)/deciliter  hydrALAZINE Injectable 10 milliGRAM(s) IV Push every 8 hours PRN SBP >180 and HR 60-70bpm  melatonin 3 milliGRAM(s) Oral at bedtime PRN Insomnia  metoprolol tartrate Injectable 5 milliGRAM(s) IV Push every 6 hours PRN SBP >170  ondansetron Injectable 4 milliGRAM(s) IV Push every 8 hours PRN Nausea and/or Vomiting      Allergies  No Known Allergies    PHYSICAL EXAM:   Vital Signs:  Vital Signs Last 24 Hrs  T(C): 36.9 (05 Dec 2024 04:24), Max: 37.2 (04 Dec 2024 19:13)  T(F): 98.5 (05 Dec 2024 04:24), Max: 99 (04 Dec 2024 19:13)  HR: 75 (05 Dec 2024 04:24) (71 - 77)  BP: 185/70 (05 Dec 2024 04:24) (144/67 - 185/70)  BP(mean): --  RR: 18 (05 Dec 2024 04:24) (18 - 20)  SpO2: 94% (05 Dec 2024 04:24) (94% - 95%)    Parameters below as of 05 Dec 2024 04:24  Patient On (Oxygen Delivery Method): room air      Daily     Daily Weight in k.7 (05 Dec 2024 04:24)    GENERAL:  Appears stated age  HEENT:  NC/AT  CHEST:  Full & symmetric excursion  HEART:  Regular rhythm  ABDOMEN:  Soft, non-tender, non-distended  EXTEREMITIES:  no cyanosis  SKIN:  No rash  NEURO:  Confused      LABS:                        7.8    5.83  )-----------( 298      ( 05 Dec 2024 10:00 )             23.7     12-04    137  |  103  |  20  ----------------------------<  318[H]  4.2   |  27  |  0.90    Ca    10.6[H]      04 Dec 2024 09:16        Urinalysis Basic - ( 04 Dec 2024 09:16 )    Color: x / Appearance: x / SG: x / pH: x  Gluc: 318 mg/dL / Ketone: x  / Bili: x / Urobili: x   Blood: x / Protein: x / Nitrite: x   Leuk Esterase: x / RBC: x / WBC x   Sq Epi: x / Non Sq Epi: x / Bacteria: x

## 2024-12-06 LAB
ANION GAP SERPL CALC-SCNC: 3 MMOL/L — LOW (ref 5–17)
BASOPHILS # BLD AUTO: 0.01 K/UL — SIGNIFICANT CHANGE UP (ref 0–0.2)
BASOPHILS NFR BLD AUTO: 0.2 % — SIGNIFICANT CHANGE UP (ref 0–2)
BUN SERPL-MCNC: 19 MG/DL — SIGNIFICANT CHANGE UP (ref 7–23)
CALCIUM SERPL-MCNC: 11.1 MG/DL — HIGH (ref 8.5–10.1)
CHLORIDE SERPL-SCNC: 103 MMOL/L — SIGNIFICANT CHANGE UP (ref 96–108)
CO2 SERPL-SCNC: 30 MMOL/L — SIGNIFICANT CHANGE UP (ref 22–31)
CREAT SERPL-MCNC: 0.77 MG/DL — SIGNIFICANT CHANGE UP (ref 0.5–1.3)
EGFR: 98 ML/MIN/1.73M2 — SIGNIFICANT CHANGE UP
EOSINOPHIL # BLD AUTO: 0.01 K/UL — SIGNIFICANT CHANGE UP (ref 0–0.5)
EOSINOPHIL NFR BLD AUTO: 0.2 % — SIGNIFICANT CHANGE UP (ref 0–6)
GLUCOSE BLDC GLUCOMTR-MCNC: 233 MG/DL — HIGH (ref 70–99)
GLUCOSE BLDC GLUCOMTR-MCNC: 260 MG/DL — HIGH (ref 70–99)
GLUCOSE BLDC GLUCOMTR-MCNC: 266 MG/DL — HIGH (ref 70–99)
GLUCOSE BLDC GLUCOMTR-MCNC: 297 MG/DL — HIGH (ref 70–99)
GLUCOSE SERPL-MCNC: 276 MG/DL — HIGH (ref 70–99)
HCT VFR BLD CALC: 23.9 % — LOW (ref 39–50)
HGB BLD-MCNC: 7.8 G/DL — LOW (ref 13–17)
IMM GRANULOCYTES NFR BLD AUTO: 0.6 % — SIGNIFICANT CHANGE UP (ref 0–0.9)
LYMPHOCYTES # BLD AUTO: 0.24 K/UL — LOW (ref 1–3.3)
LYMPHOCYTES # BLD AUTO: 4.9 % — LOW (ref 13–44)
MCHC RBC-ENTMCNC: 29 PG — SIGNIFICANT CHANGE UP (ref 27–34)
MCHC RBC-ENTMCNC: 32.6 G/DL — SIGNIFICANT CHANGE UP (ref 32–36)
MCV RBC AUTO: 88.8 FL — SIGNIFICANT CHANGE UP (ref 80–100)
MONOCYTES # BLD AUTO: 0.35 K/UL — SIGNIFICANT CHANGE UP (ref 0–0.9)
MONOCYTES NFR BLD AUTO: 7.1 % — SIGNIFICANT CHANGE UP (ref 2–14)
NEUTROPHILS # BLD AUTO: 4.26 K/UL — SIGNIFICANT CHANGE UP (ref 1.8–7.4)
NEUTROPHILS NFR BLD AUTO: 87 % — HIGH (ref 43–77)
NRBC # BLD: 0 /100 WBCS — SIGNIFICANT CHANGE UP (ref 0–0)
PLATELET # BLD AUTO: 339 K/UL — SIGNIFICANT CHANGE UP (ref 150–400)
POTASSIUM SERPL-MCNC: 4 MMOL/L — SIGNIFICANT CHANGE UP (ref 3.5–5.3)
POTASSIUM SERPL-SCNC: 4 MMOL/L — SIGNIFICANT CHANGE UP (ref 3.5–5.3)
PROT SERPL-MCNC: 6.6 G/DL — SIGNIFICANT CHANGE UP (ref 6–8.3)
RBC # BLD: 2.69 M/UL — LOW (ref 4.2–5.8)
RBC # FLD: 20.1 % — HIGH (ref 10.3–14.5)
SODIUM SERPL-SCNC: 136 MMOL/L — SIGNIFICANT CHANGE UP (ref 135–145)
WBC # BLD: 4.9 K/UL — SIGNIFICANT CHANGE UP (ref 3.8–10.5)
WBC # FLD AUTO: 4.9 K/UL — SIGNIFICANT CHANGE UP (ref 3.8–10.5)

## 2024-12-06 PROCEDURE — 99233 SBSQ HOSP IP/OBS HIGH 50: CPT | Mod: GC

## 2024-12-06 PROCEDURE — 99232 SBSQ HOSP IP/OBS MODERATE 35: CPT

## 2024-12-06 RX ORDER — PAMIDRONATE DISODIUM 3 MG/ML
60 INJECTION, SOLUTION INTRAVENOUS ONCE
Refills: 0 | Status: COMPLETED | OUTPATIENT
Start: 2024-12-06 | End: 2024-12-06

## 2024-12-06 RX ORDER — CLONIDINE HYDROCHLORIDE 0.3 MG/1
0.1 TABLET ORAL EVERY 12 HOURS
Refills: 0 | Status: DISCONTINUED | OUTPATIENT
Start: 2024-12-06 | End: 2024-12-09

## 2024-12-06 RX ADMIN — Medication 300 MILLIGRAM(S): at 11:00

## 2024-12-06 RX ADMIN — Medication 500 MILLIGRAM(S): at 05:50

## 2024-12-06 RX ADMIN — LISINOPRIL 40 MILLIGRAM(S): 20 TABLET ORAL at 05:49

## 2024-12-06 RX ADMIN — ESCITALOPRAM OXALATE 10 MILLIGRAM(S): 10 TABLET, FILM COATED ORAL at 11:00

## 2024-12-06 RX ADMIN — AMLODIPINE BESYLATE 10 MILLIGRAM(S): 10 TABLET ORAL at 05:50

## 2024-12-06 RX ADMIN — Medication 4: at 17:05

## 2024-12-06 RX ADMIN — AZATHIOPRINE 50 MILLIGRAM(S): 100 TABLET ORAL at 06:04

## 2024-12-06 RX ADMIN — HYDRALAZINE HYDROCHLORIDE 100 MILLIGRAM(S): 10 TABLET ORAL at 21:23

## 2024-12-06 RX ADMIN — CARVEDILOL 12.5 MILLIGRAM(S): 25 TABLET, FILM COATED ORAL at 17:06

## 2024-12-06 RX ADMIN — TACROLIMUS 1 MILLIGRAM(S): 5 CAPSULE ORAL at 21:24

## 2024-12-06 RX ADMIN — Medication 325 MILLIGRAM(S): at 17:07

## 2024-12-06 RX ADMIN — Medication 15 MILLILITER(S): at 11:01

## 2024-12-06 RX ADMIN — CARVEDILOL 12.5 MILLIGRAM(S): 25 TABLET, FILM COATED ORAL at 05:49

## 2024-12-06 RX ADMIN — Medication 2 TABLET(S): at 21:24

## 2024-12-06 RX ADMIN — Medication 325 MILLIGRAM(S): at 05:50

## 2024-12-06 RX ADMIN — PAMIDRONATE DISODIUM 86.67 MILLIGRAM(S): 3 INJECTION, SOLUTION INTRAVENOUS at 15:20

## 2024-12-06 RX ADMIN — Medication 81 MILLIGRAM(S): at 11:00

## 2024-12-06 RX ADMIN — ERTAPENEM 120 MILLIGRAM(S): 1 INJECTION, POWDER, LYOPHILIZED, FOR SOLUTION INTRAMUSCULAR; INTRAVENOUS at 04:00

## 2024-12-06 RX ADMIN — AZATHIOPRINE 50 MILLIGRAM(S): 100 TABLET ORAL at 17:08

## 2024-12-06 RX ADMIN — Medication 6: at 12:08

## 2024-12-06 RX ADMIN — TACROLIMUS 2 MILLIGRAM(S): 5 CAPSULE ORAL at 11:01

## 2024-12-06 RX ADMIN — ESCITALOPRAM OXALATE 10 MILLIGRAM(S): 10 TABLET, FILM COATED ORAL at 06:02

## 2024-12-06 RX ADMIN — ROSUVASTATIN CALCIUM 10 MILLIGRAM(S): 5 TABLET, FILM COATED ORAL at 21:23

## 2024-12-06 RX ADMIN — PANTOPRAZOLE SODIUM 40 MILLIGRAM(S): 40 TABLET, DELAYED RELEASE ORAL at 05:49

## 2024-12-06 RX ADMIN — Medication 500 MILLIGRAM(S): at 17:07

## 2024-12-06 RX ADMIN — ESCITALOPRAM OXALATE 10 MILLIGRAM(S): 10 TABLET, FILM COATED ORAL at 17:07

## 2024-12-06 RX ADMIN — Medication 88 MICROGRAM(S): at 05:49

## 2024-12-06 RX ADMIN — Medication 3 UNIT(S): at 17:06

## 2024-12-06 RX ADMIN — HYDRALAZINE HYDROCHLORIDE 100 MILLIGRAM(S): 10 TABLET ORAL at 11:30

## 2024-12-06 RX ADMIN — PANTOPRAZOLE SODIUM 40 MILLIGRAM(S): 40 TABLET, DELAYED RELEASE ORAL at 17:07

## 2024-12-06 RX ADMIN — INSULIN GLARGINE 20 UNIT(S): 100 INJECTION, SOLUTION SUBCUTANEOUS at 22:17

## 2024-12-06 RX ADMIN — CLONIDINE HYDROCHLORIDE 0.1 MILLIGRAM(S): 0.3 TABLET ORAL at 17:08

## 2024-12-06 RX ADMIN — CALCITONIN SALMON 310 INTERNATIONAL UNIT(S): 200 INJECTION, SOLUTION INTRAMUSCULAR; SUBCUTANEOUS at 05:48

## 2024-12-06 RX ADMIN — HYDRALAZINE HYDROCHLORIDE 100 MILLIGRAM(S): 10 TABLET ORAL at 05:49

## 2024-12-06 RX ADMIN — Medication 6: at 08:29

## 2024-12-06 RX ADMIN — Medication 50 MILLIGRAM(S): at 11:00

## 2024-12-06 NOTE — PROGRESS NOTE ADULT - ASSESSMENT
70yo M, PMH DM, HTN, HLD, h/o kidney transplant, hypothyroidism, presents to ED from [facility] for AMS, found to be febrile with positive UA. 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 Cardiology called for pericardial effusion.     Uncontrolled HTN/Pericardial Effusion  - CT chest: Small left pleural effusion with small loculated components, Small to moderate pericardial effusion  - TTE showed EF 60%, trace pericardial effusion adjacent ot the posterior LV.  No significant valvular disease  - Pt w/no signs of tamponade on examination    - No evidence of any meaningful volume overload   - Okay with IV fluids.  He appears to be dry on exam    - EKG with nonspecific TWI anteriorly, repeat unchanged  - No evidence of any active ischemia   - Continue statin   - In the setting of persistent anemia (neg FOBT), with Hgb 6-7, would D/C ASA if no clear indication    - BP improved to systolic 150 with increase in ACEI  - Continue Norvasc 10, Coreg 12.5, and Hydralazine 10 mg q8H  - Continue Lisinopril 40 mg daily     - Heme and GI following for anemia  - Ortho following for pathological sacral Fx with possible OM.  No acute orthopedic surgical intervention at this time.  - Abx per ID     - Monitor and replete lytes, keep K>4, Mg>2.  - Will continue to follow.    Oralia Mendoza DNP, NP-C, AGACNP-C  Cardiology   Call TEAMS

## 2024-12-06 NOTE — PROGRESS NOTE ADULT - ASSESSMENT
CKD 3, h/o Kidney transplant ~2012, h/o Left Nephrectomy  Diabetes  Hypertension  Sepsis, UTI, Sacral osteomyelitis, Gram negative bacteremia  Hypokalemia  Hypercalcemia, Elevated 1,25 Vitamin D  Hypomagnesemia  Hypophosphatemia  Anemia  + Liver mass    11/30/24: IV hydration. Potassium and magnesium supplementation. Work up for hypercalcemia as ordered. To continue preadmission immuno suppressants.   Monitor blood sugar levels. Insulin coverage as needed. Dietary restriction. Trend BP and titrate BP meds as needed.   Avoid nephrotoxic meds as possible. IV abx, ID follow up. Will follow electrolytes and renal function trend.   12/01/24: Lower IVF. Trend BP and titrate BP meds as needed. To continue current meds. Phosphorous supplementation. IV abx, ID follow up. Discussed with patients wife at the bedside.   12/02/24: Stable renal indices. To continue current meds. Magnesium and phos supps. May need PRBC tx if no improvement. Will follow electrolytes and renal function trend.   12/03/24: Renal indices remain stable. PRBC transfusion. Avoid nephrotoxic meds as possible. Potassium supplementation. Work up for liver mass.   12/04/24: Stable renal indices. MRI results noted. To continue current meds. Monitor h/h trend. Transfuse PRBC's PRN.   12/05/24: Renal indices stable. Elevated 1,25 vitamin D. Exogenous Vitamin D stopped. CT result noted. + RP LN. D/w hematology. Awaiting out pt biopsy results which pts wife will bring in.  Trend BP and titrate BP meds as needed.   12/06/24: Persistent hypercalcemia. Will dose aredia. Awaiting biopsy results. To continue current meds. D/c planning.  CKD 3, h/o Kidney transplant ~2012, h/o Left Nephrectomy  Diabetes  Hypertension  Sepsis, UTI, Sacral osteomyelitis, Gram negative bacteremia  Hypokalemia  Hypercalcemia, Elevated 1,25 Vitamin D  Hypomagnesemia  Hypophosphatemia  Anemia  + Liver mass    11/30/24: IV hydration. Potassium and magnesium supplementation. Work up for hypercalcemia as ordered. To continue preadmission immuno suppressants.   Monitor blood sugar levels. Insulin coverage as needed. Dietary restriction. Trend BP and titrate BP meds as needed.   Avoid nephrotoxic meds as possible. IV abx, ID follow up. Will follow electrolytes and renal function trend.   12/01/24: Lower IVF. Trend BP and titrate BP meds as needed. To continue current meds. Phosphorous supplementation. IV abx, ID follow up. Discussed with patients wife at the bedside.   12/02/24: Stable renal indices. To continue current meds. Magnesium and phos supps. May need PRBC tx if no improvement. Will follow electrolytes and renal function trend.   12/03/24: Renal indices remain stable. PRBC transfusion. Avoid nephrotoxic meds as possible. Potassium supplementation. Work up for liver mass.   12/04/24: Stable renal indices. MRI results noted. To continue current meds. Monitor h/h trend. Transfuse PRBC's PRN.   12/05/24: Renal indices stable. Elevated 1,25 vitamin D. Exogenous Vitamin D stopped. CT result noted. ? + RP fibrosis vs soft tissue. D/w hematology. Awaiting out pt biopsy results which pts wife will bring in.  Trend BP and titrate BP meds as needed.   12/06/24: Persistent hypercalcemia. Will dose aredia. Awaiting biopsy results. To continue current meds. D/c planning.

## 2024-12-06 NOTE — PROGRESS NOTE ADULT - ASSESSMENT
IMPRESSION  Anemia multifactorial in etiology related to prior renal transplant with meds and now with osteomyelitis and sepsis with Gram neg maury sepsis  Blood and urine culture with E. Coli  Hgb 6.8==>7.3 ==>7.1 g/dL after receiving 1 unit PRBC  expect to be transfusion dependent with active infection    Ferritin 885, iron 22, TIBC 164, sat 14%  PSA 12.3    ,     5cm liver mass  Prior known, s/p liver bx at [facility], wife brought in report which shows diffuse lymphoplasmacytic infiltrated with lobular necroinflammatory processportal and periportal fibrosis also noted    MGUS  IgM-L  hx of splenomegaly 14cm since 2018    RECOMMENDATIONS    Anemia  Follow CBC and transfuse as indicated  recommend conservative mgmt of anemia.   With prior renal transplant would use irradiated PRBC and try to keep transfusions to a minimum.     MGUS and hypercalcemia  VitD1,25OH 102.0, VitD 39.2  Continue renal evaluation.   Note calcium increased 11.7==>11.2==>10.6  discussed with Dr. Bruce  Start calcitonin x48hr    biopsy does not appear to rule out lymphoma  to have films review by IR for possible repeat biopsy    continue ID and orthopedic management of osteomyelitis    d/w Dr Upton

## 2024-12-06 NOTE — PROGRESS NOTE ADULT - SUBJECTIVE AND OBJECTIVE BOX
Patient is a 69y old  Male who presents with a chief complaint of AMS (06 Dec 2024 12:00)    pt seen and examine today awake  still high bp adjusted bp meds  , no fever , tolerating po , oob with physical therapy.  INTERVAL HPI/OVERNIGHT EVENTS:     T(C): 36.8 (12-06-24 @ 10:30), Max: 36.8 (12-06-24 @ 10:30)  HR: 76 (12-06-24 @ 10:30) (76 - 80)  BP: 150/64 (12-06-24 @ 10:30) (148/62 - 188/70)  RR: 18 (12-06-24 @ 04:25) (18 - 18)  SpO2: 94% (12-06-24 @ 04:25) (94% - 96%)  Wt(kg): --  I&O's Summary    05 Dec 2024 07:01  -  06 Dec 2024 07:00  --------------------------------------------------------  IN: 0 mL / OUT: 450 mL / NET: -450 mL    06 Dec 2024 07:01  -  06 Dec 2024 12:27  --------------------------------------------------------  IN: 0 mL / OUT: 500 mL / NET: -500 mL      REVIEW OF SYSTEMS:  CONSTITUTIONAL: No fever, weight loss, + fatigue  EYES: No eye pain, visual disturbances, or discharge  ENMT:  No difficulty hearing, tinnitus, vertigo; No sinus or throat pain  NECK: No pain or stiffness  RESPIRATORY: No cough, wheezing, chills ; No shortness of breath  CARDIOVASCULAR: No chest pain, palpitations, dizziness, or leg swelling  GASTROINTESTINAL: No abdominal or epigastric pain. No nausea, vomiting,  No diarrhea or constipation. No melena or hematochezia.  GENITOURINARY: No dysuria, frequency, hematuria, or incontinence  NEUROLOGICAL: No headaches, memory loss, loss of strength, numbness, or tremors  SKIN: No itching, burning, rashes, or lesions   MUSCULOSKELETAL: No joint pain or swelling; No muscle, back, or extremity pain    PHYSICAL EXAM:  GENERAL: NAD,  weak   HEAD:  Atraumatic, Normocephalic  EYES: EOMI, PERRLA, conjunctiva and sclera clear  ENMT t; Moist mucous membranes  NECK: Supple, No JVD  NERVOUS SYSTEM:  Alert & Oriented X3; Motor Strength 5/5 B/L upper and lower extremities; DTRs 2+ intact and symmetric  CHEST/LUNG:   percussion bilaterally; No rales, rhonchi, wheezing,   HEART: Regular rate and rhythm; No murmurs,   ABDOMEN: Soft, Nontender, Nondistended; Bowel sounds present  EXTREMITIES:  2+ Peripheral Pulses, No clubbing, cyanosis, or edema  SKIN: No rashes or lesions / picc line intact     MEDICATIONS  (STANDING):  amLODIPine   Tablet 10 milliGRAM(s) Oral daily  ascorbic acid 500 milliGRAM(s) Oral two times a day  azaTHIOprine 50 milliGRAM(s) Oral two times a day  buPROPion XL (24-Hour) . 300 milliGRAM(s) Oral daily  carvedilol 12.5 milliGRAM(s) Oral every 12 hours  cloNIDine 0.1 milliGRAM(s) Oral every 12 hours  dextrose 5%. 1000 milliLiter(s) (100 mL/Hr) IV Continuous <Continuous>  dextrose 5%. 1000 milliLiter(s) (50 mL/Hr) IV Continuous <Continuous>  dextrose 50% Injectable 25 Gram(s) IV Push once  dextrose 50% Injectable 12.5 Gram(s) IV Push once  dextrose 50% Injectable 25 Gram(s) IV Push once  ertapenem  IVPB      ertapenem  IVPB 1000 milliGRAM(s) IV Intermittent every 24 hours  escitalopram 10 milliGRAM(s) Oral <User Schedule>  ferrous    sulfate 325 milliGRAM(s) Oral two times a day  glucagon  Injectable 1 milliGRAM(s) IntraMuscular once  hydrALAZINE 100 milliGRAM(s) Oral three times a day  insulin glargine Injectable (LANTUS) 20 Unit(s) SubCutaneous at bedtime  insulin lispro (ADMELOG) corrective regimen sliding scale   SubCutaneous three times a day before meals  levothyroxine 88 MICROGram(s) Oral <User Schedule>  lisinopril 40 milliGRAM(s) Oral daily  mineral oil enema 133 milliLiter(s) Rectal once  multivitamin/minerals/iron Oral Solution (CENTRUM) 15 milliLiter(s) Oral daily  pamidronate IVPB 60 milliGRAM(s) IV Intermittent once  pantoprazole    Tablet 40 milliGRAM(s) Oral two times a day  polyethylene glycol 3350 17 Gram(s) Oral daily  pyridoxine 50 milliGRAM(s) Oral daily  rosuvastatin 10 milliGRAM(s) Oral at bedtime  senna 2 Tablet(s) Oral at bedtime  tacrolimus 2 milliGRAM(s) Oral daily  tacrolimus 1 milliGRAM(s) Oral at bedtime    MEDICATIONS  (PRN):  acetaminophen   IVPB .. 1000 milliGRAM(s) IV Intermittent every 8 hours PRN Temp greater or equal to 38C (100.4F)  aluminum hydroxide/magnesium hydroxide/simethicone Suspension 30 milliLiter(s) Oral every 4 hours PRN Dyspepsia  dextrose Oral Gel 15 Gram(s) Oral once PRN Blood Glucose LESS THAN 70 milliGRAM(s)/deciliter  hydrALAZINE Injectable 10 milliGRAM(s) IV Push every 8 hours PRN SBP >180 and HR 60-70bpm  melatonin 3 milliGRAM(s) Oral at bedtime PRN Insomnia  metoprolol tartrate Injectable 5 milliGRAM(s) IV Push every 6 hours PRN SBP >170  ondansetron Injectable 4 milliGRAM(s) IV Push every 8 hours PRN Nausea and/or Vomiting      LABS:                        7.8    4.90  )-----------( 339      ( 06 Dec 2024 06:50 )             23.9     12-06    136  |  103  |  19  ----------------------------<  276[H]  4.0   |  30  |  0.77    Ca    11.1[H]      06 Dec 2024 06:50        Urinalysis Basic - ( 06 Dec 2024 06:50 )    Color: x / Appearance: x / SG: x / pH: x  Gluc: 276 mg/dL / Ketone: x  / Bili: x / Urobili: x   Blood: x / Protein: x / Nitrite: x   Leuk Esterase: x / RBC: x / WBC x   Sq Epi: x / Non Sq Epi: x / Bacteria: x      CAPILLARY BLOOD GLUCOSE      POCT Blood Glucose.: 260 mg/dL (06 Dec 2024 11:51)  POCT Blood Glucose.: 297 mg/dL (06 Dec 2024 08:04)  POCT Blood Glucose.: 251 mg/dL (05 Dec 2024 21:31)  POCT Blood Glucose.: 302 mg/dL (05 Dec 2024 16:45)              RADIOLOGY & ADDITIONAL TESTS:    Imaging Personally Reviewed:   no new test     Advance Directives:    full code  Palliative Care:  Appropriate

## 2024-12-06 NOTE — PROGRESS NOTE ADULT - ASSESSMENT
Patient is a 68yo M, PMH DM, HTN, HLD, h/o kidney transplant, hypothyroidism, presents to ED from [facility] for AMS. Patient is lethargic and unable to provide history at this time.    ESBL Bacteremia 2/2 Acute Cystitis  Sacral Wound/OM  Liver Mass w/ mets  Fevers  AMS 2/2 above  Febrile in the .5  UA positive for UTI  Flu/COVID/RSV negative  11/29 BCx ESBL E.coli , repeat negative  11/29 UCx   50,000 - 99,000 CFU/mL Escherichia coli    CT CAP w/ Small left pleural effusion with small loculated components  Right pelvic transplant kidney with mild fullness of the pelvic alyceal system. Stercoral proctitis.  Patchy sclerosis and osteolysis throughout the bilateral sacrum with pathologic fractures. There is soft tissue with stranding extending throughout the presacral and posterior extraperitoneal pelvic soft tissues.  There are a few bubbles of air/gas at the right sacral pathologic fracture, findings suggestive of infection/osteomyelitis.    MRI Again seen are comminuted bilateral sacral lona fractures with marked osseous edema and marked loss of normal T1 fatty marrow. Osseous edema with loss of normal T1 fatty marrow at thecaudal aspect of the left   posterior iliac bone adjacent to the sacroiliac joint. Previously seen fracture component is better visualized on CT. These findings could be secondary to extensive fracture deformities in an osteopenic patient   versus osteomyelitis if there was a history of a soft tissue ulcer extending to bone versus metastatic disease given the marked loss of T1 fatty marrow if there is a history of malignancy. Clinical correlation   with patient history is advised.    Reviewed w/ admitting attending, no sacral wound present. Suspect findings on sacral more related to mets from possible malignancy    Recommendations:   C/w ertapenem 1gm q24h x10 day course until 12/10  Trend temps/WBC  Monitor Hgb, transfusion prn protocol  Surgery following  Per Ortho no surgical intervention for fracture of sacrum    Additional care per primary team    Infectious Diseases will follow. Please call with any questions.  Vanessa Saul M.D.  \[facility] [place] [facility]\"" Division of Infectious Diseases [phone]  For after 5 P.M. and weekends, please call [phone]  Available on [employer] TEAMS

## 2024-12-06 NOTE — PROGRESS NOTE ADULT - SUBJECTIVE AND OBJECTIVE BOX
[facility], Division of Infectious Diseases  WANG Mccoy Y. Patel, S. Shah, G. [facility]  [phone]    Name: JAN CALVIN  Age: 69y  Gender: Male  MRN: [id number]    Interval History:  Patient seen and examined at bedside   No acute overnight events. Afebrile  No complaints  Notes reviewed    Antibiotics:  ertapenem  IVPB      ertapenem  IVPB 1000 milliGRAM(s) IV Intermittent every 24 hours      Medications:  acetaminophen   IVPB .. 1000 milliGRAM(s) IV Intermittent every 8 hours PRN  aluminum hydroxide/magnesium hydroxide/simethicone Suspension 30 milliLiter(s) Oral every 4 hours PRN  amLODIPine   Tablet 10 milliGRAM(s) Oral daily  ascorbic acid 500 milliGRAM(s) Oral two times a day  aspirin enteric coated 81 milliGRAM(s) Oral daily  azaTHIOprine 50 milliGRAM(s) Oral two times a day  buPROPion XL (24-Hour) . 300 milliGRAM(s) Oral daily  carvedilol 12.5 milliGRAM(s) Oral every 12 hours  dextrose 5%. 1000 milliLiter(s) IV Continuous <Continuous>  dextrose 5%. 1000 milliLiter(s) IV Continuous <Continuous>  dextrose 50% Injectable 25 Gram(s) IV Push once  dextrose 50% Injectable 12.5 Gram(s) IV Push once  dextrose 50% Injectable 25 Gram(s) IV Push once  dextrose Oral Gel 15 Gram(s) Oral once PRN  ertapenem  IVPB      ertapenem  IVPB 1000 milliGRAM(s) IV Intermittent every 24 hours  escitalopram 10 milliGRAM(s) Oral <User Schedule>  ferrous    sulfate 325 milliGRAM(s) Oral two times a day  glucagon  Injectable 1 milliGRAM(s) IntraMuscular once  hydrALAZINE 100 milliGRAM(s) Oral three times a day  hydrALAZINE Injectable 10 milliGRAM(s) IV Push every 8 hours PRN  insulin glargine Injectable (LANTUS) 20 Unit(s) SubCutaneous at bedtime  insulin lispro (ADMELOG) corrective regimen sliding scale   SubCutaneous three times a day before meals  levothyroxine 88 MICROGram(s) Oral <User Schedule>  lisinopril 40 milliGRAM(s) Oral daily  melatonin 3 milliGRAM(s) Oral at bedtime PRN  metoprolol tartrate Injectable 5 milliGRAM(s) IV Push every 6 hours PRN  mineral oil enema 133 milliLiter(s) Rectal once  multivitamin/minerals/iron Oral Solution (CENTRUM) 15 milliLiter(s) Oral daily  ondansetron Injectable 4 milliGRAM(s) IV Push every 8 hours PRN  pantoprazole    Tablet 40 milliGRAM(s) Oral two times a day  polyethylene glycol 3350 17 Gram(s) Oral daily  pyridoxine 50 milliGRAM(s) Oral daily  rosuvastatin 10 milliGRAM(s) Oral at bedtime  senna 2 Tablet(s) Oral at bedtime  sodium chloride 0.45%. 1000 milliLiter(s) IV Continuous <Continuous>  tacrolimus 2 milliGRAM(s) Oral daily  tacrolimus 1 milliGRAM(s) Oral at bedtime      Review of Systems:  A 10-point review of systems was obtained.   Review of systems otherwise negative except as previously noted.    Allergies: No Known Allergies    For details regarding the patient's past medical history, social history, family history, and other miscellaneous elements, please refer the initial infectious diseases consultation and/or the admitting history and physical examination for this admission.    Objective:  Vitals:   T(C): 36.4 (12-06-24 @ 04:25), Max: 36.8 (12-05-24 @ 11:07)  HR: 78 (12-06-24 @ 04:25) (77 - 80)  BP: 188/70 (12-06-24 @ 04:25) (148/62 - 197/70)  RR: 18 (12-06-24 @ 04:25) (18 - 18)  SpO2: 94% (12-06-24 @ 04:25) (94% - 97%)    Physical Examination:  General: no acute distress  HEENT: NC/AT, EOMI,   Cardio: RRR  Resp: decreased breath sounds  Abd: soft, NT, ND,  Ext: no edema or cyanosis  Skin: warm, dry, no visible rash      Laboratory Studies:  CBC:                       7.8    4.90  )-----------( 339      ( 06 Dec 2024 06:50 )             23.9     CMP: 12-06    136  |  103  |  19  ----------------------------<  276[H]  4.0   |  30  |  0.77    Ca    11.1[H]      06 Dec 2024 06:50        Urinalysis Basic - ( 06 Dec 2024 06:50 )    Color: x / Appearance: x / SG: x / pH: x  Gluc: 276 mg/dL / Ketone: x  / Bili: x / Urobili: x   Blood: x / Protein: x / Nitrite: x   Leuk Esterase: x / RBC: x / WBC x   Sq Epi: x / Non Sq Epi: x / Bacteria: x        Microbiology: reviewed    Culture - Blood (collected 11-30-24 @ 07:05)  Source: .Blood BLOOD  Final Report (12-05-24 @ 13:00):    No growth at 5 days    Culture - Blood (collected 11-30-24 @ 07:00)  Source: .Blood BLOOD  Final Report (12-05-24 @ 13:00):    No growth at 5 days    Culture - Urine (collected 11-29-24 @ 11:00)  Source: Catheterized  Final Report (12-01-24 @ 10:17):    50,000 - 99,000 CFU/mL Escherichia coli ESBL  Organism: Escherichia coli ESBL (12-01-24 @ 10:17)  Organism: Escherichia coli ESBL (12-01-24 @ 10:17)      Method Type: CHRIST      -  Ampicillin: R >16 These ampicillin results predict results for amoxicillin      -  Ampicillin/Sulbactam: I 16/8      -  Aztreonam: R >16      -  Cefazolin: R >16 For uncomplicated UTI with K. pneumoniae, E. coli, or P. mirablis: CHRIST <=16 is sensitive and CHRIST >=32 is resistant. This also predicts results for oral agents cefaclor, cefdinir, cefpodoxime, cefprozil, cefuroxime axetil, cephalexin and locarbef for uncomplicated UTI. Note that some isolates may be susceptible to these agents while testing resistant to cefazolin.      -  Cefepime: R >16      -  Ceftriaxone: R >32      -  Cefuroxime: R >16      -  Ciprofloxacin: R >2      -  Ertapenem: S <=0.5      -  Gentamicin: R >8      -  Imipenem: S <=1      -  Levofloxacin: R 4      -  Meropenem: S <=1      -  Nitrofurantoin: S <=32 Should not be used to treat pyelonephritis      -  Piperacillin/Tazobactam: S <=8      -  Tobramycin: R >8      -  Trimethoprim/Sulfamethoxazole: R >2/38    Urinalysis with Rflx Culture (collected 11-29-24 @ 11:00)    Culture - Blood (collected 11-29-24 @ 07:15)  Source: .Blood BLOOD  Gram Stain (11-29-24 @ 21:31):    Growth in aerobic bottle: Gram Negative Rods    Growth in anaerobic bottle: Gram Negative Rods  Final Report (12-01-24 @ 17:40):    Growth in aerobic and anaerobic bottles: Escherichia coli ESBL  Organism: Escherichia coli ESBL (12-01-24 @ 17:40)  Organism: Escherichia coli ESBL (12-01-24 @ 17:40)      Method Type: CHRIST      -  Ampicillin: R >16 These ampicillin results predict results for amoxicillin      -  Ampicillin/Sulbactam: R >16/8      -  Aztreonam: R >16      -  Cefazolin: R >16      -  Cefepime: R >16      -  Ceftriaxone: R >32      -  Ciprofloxacin: R >2      -  Ertapenem: S <=0.5      -  Gentamicin: R >8      -  Imipenem: S <=1      -  Levofloxacin: R 4      -  Meropenem: S <=1      -  Piperacillin/Tazobactam: R <=8      -  Tobramycin: R >8      -  Trimethoprim/Sulfamethoxazole: R >2/38    Culture - Blood (collected 11-29-24 @ 07:10)  Source: .Blood BLOOD  Gram Stain (11-29-24 @ 22:18):    Growth in anaerobic bottle: Gram Negative Rods    Growth in aerobic bottle: Gram Negative Rods  Final Report (12-01-24 @ 17:41):    Growth in aerobic and anaerobic bottles: Escherichia coli ESBL    Direct identification is available within approximately 3-5    hours either by Blood Panel Multiplexed PCR or Direct    MALDI-TOF. Details: [email or web address]  Organism: Blood Culture PCR (12-01-24 @ 17:41)  Organism: Blood Culture PCR (12-01-24 @ 17:41)      Method Type: PCR      -  Escherichia coli: Detec      -  ESBL: Detec      -  CTX-M Resistance Marker: Detec          Radiology: reviewed

## 2024-12-06 NOTE — PROGRESS NOTE ADULT - TIME BILLING
Note written by attending. Patient seen and examined. Meds, labs, vitals, chart reviewed.   pt have liver mass as per  hem/onc dr corona will need repeat  liver biopsy  next wk / dc asa .

## 2024-12-06 NOTE — PROGRESS NOTE ADULT - SUBJECTIVE AND OBJECTIVE BOX
Interval History:  no new complaints  Ca still being treated  Chart reviewed and events noted;   Overnight events:    MEDICATIONS  (STANDING):  amLODIPine   Tablet 10 milliGRAM(s) Oral daily  ascorbic acid 500 milliGRAM(s) Oral two times a day  azaTHIOprine 50 milliGRAM(s) Oral two times a day  buPROPion XL (24-Hour) . 300 milliGRAM(s) Oral daily  carvedilol 12.5 milliGRAM(s) Oral every 12 hours  cloNIDine 0.1 milliGRAM(s) Oral every 12 hours  dextrose 5%. 1000 milliLiter(s) (100 mL/Hr) IV Continuous <Continuous>  dextrose 5%. 1000 milliLiter(s) (50 mL/Hr) IV Continuous <Continuous>  dextrose 50% Injectable 25 Gram(s) IV Push once  dextrose 50% Injectable 12.5 Gram(s) IV Push once  dextrose 50% Injectable 25 Gram(s) IV Push once  ertapenem  IVPB      ertapenem  IVPB 1000 milliGRAM(s) IV Intermittent every 24 hours  escitalopram 10 milliGRAM(s) Oral <User Schedule>  ferrous    sulfate 325 milliGRAM(s) Oral two times a day  glucagon  Injectable 1 milliGRAM(s) IntraMuscular once  hydrALAZINE 100 milliGRAM(s) Oral three times a day  insulin glargine Injectable (LANTUS) 20 Unit(s) SubCutaneous at bedtime  insulin lispro (ADMELOG) corrective regimen sliding scale   SubCutaneous three times a day before meals  levothyroxine 88 MICROGram(s) Oral <User Schedule>  lisinopril 40 milliGRAM(s) Oral daily  mineral oil enema 133 milliLiter(s) Rectal once  multivitamin/minerals/iron Oral Solution (CENTRUM) 15 milliLiter(s) Oral daily  pamidronate IVPB 60 milliGRAM(s) IV Intermittent once  pantoprazole    Tablet 40 milliGRAM(s) Oral two times a day  polyethylene glycol 3350 17 Gram(s) Oral daily  pyridoxine 50 milliGRAM(s) Oral daily  rosuvastatin 10 milliGRAM(s) Oral at bedtime  senna 2 Tablet(s) Oral at bedtime  tacrolimus 2 milliGRAM(s) Oral daily  tacrolimus 1 milliGRAM(s) Oral at bedtime    MEDICATIONS  (PRN):  acetaminophen   IVPB .. 1000 milliGRAM(s) IV Intermittent every 8 hours PRN Temp greater or equal to 38C (100.4F)  aluminum hydroxide/magnesium hydroxide/simethicone Suspension 30 milliLiter(s) Oral every 4 hours PRN Dyspepsia  dextrose Oral Gel 15 Gram(s) Oral once PRN Blood Glucose LESS THAN 70 milliGRAM(s)/deciliter  hydrALAZINE Injectable 10 milliGRAM(s) IV Push every 8 hours PRN SBP >180 and HR 60-70bpm  melatonin 3 milliGRAM(s) Oral at bedtime PRN Insomnia  metoprolol tartrate Injectable 5 milliGRAM(s) IV Push every 6 hours PRN SBP >170  ondansetron Injectable 4 milliGRAM(s) IV Push every 8 hours PRN Nausea and/or Vomiting      Vital Signs Last 24 Hrs  T(C): 36.8 (06 Dec 2024 10:30), Max: 36.8 (06 Dec 2024 10:30)  T(F): 98.3 (06 Dec 2024 10:30), Max: 98.3 (06 Dec 2024 10:30)  HR: 76 (06 Dec 2024 10:30) (76 - 80)  BP: 150/64 (06 Dec 2024 10:30) (148/62 - 188/70)  BP(mean): --  RR: 18 (06 Dec 2024 04:25) (18 - 18)  SpO2: 94% (06 Dec 2024 04:25) (94% - 96%)    Parameters below as of 06 Dec 2024 04:25  Patient On (Oxygen Delivery Method): room air        PHYSICAL EXAM  General: adult in NAD  HEENT: clear oropharynx, anicteric sclera, pink conjunctivae  Neck: supple  CV: normal S1S2 with no murmur rubs or gallops  Lungs: clear to auscultation, no wheezes, no rhales  Abdomen: soft non-tender non-distended, no hepato/splenomegaly  Ext: no clubbing cyanosis or edema  Skin: no rashes and no petichiae  Neuro: alert and oriented X3 no focal deficits      LABS:  CBC Full  -  ( 06 Dec 2024 06:50 )  WBC Count : 4.90 K/uL  RBC Count : 2.69 M/uL  Hemoglobin : 7.8 g/dL  Hematocrit : 23.9 %  Platelet Count - Automated : 339 K/uL  Mean Cell Volume : 88.8 fl  Mean Cell Hemoglobin : 29.0 pg  Mean Cell Hemoglobin Concentration : 32.6 g/dL  Auto Neutrophil # : 4.26 K/uL  Auto Lymphocyte # : 0.24 K/uL  Auto Monocyte # : 0.35 K/uL  Auto Eosinophil # : 0.01 K/uL  Auto Basophil # : 0.01 K/uL  Auto Neutrophil % : 87.0 %  Auto Lymphocyte % : 4.9 %  Auto Monocyte % : 7.1 %  Auto Eosinophil % : 0.2 %  Auto Basophil % : 0.2 %    12-06    136  |  103  |  19  ----------------------------<  276[H]  4.0   |  30  |  0.77    Ca    11.1[H]      06 Dec 2024 06:50          fe studies  Iron - Total Binding Capacity.: 182 ug/dL (12-05 @ 10:00)  Ferritin: 973 ng/mL (12-05 @ 10:00)      WBC trend  4.90 K/uL (12-06-24 @ 06:50)  5.83 K/uL (12-05-24 @ 10:00)  4.17 K/uL (12-04-24 @ 09:16)      Hgb trend  7.8 g/dL (12-06-24 @ 06:50)  7.8 g/dL (12-05-24 @ 10:00)  6.6 g/dL (12-04-24 @ 16:06)  6.7 g/dL (12-04-24 @ 10:46)  6.3 g/dL (12-04-24 @ 09:16)      plt trend  339 K/uL (12-06-24 @ 06:50)  298 K/uL (12-05-24 @ 10:00)  224 K/uL (12-04-24 @ 09:16)        RADIOLOGY & ADDITIONAL STUDIES:    < from: CT Abdomen and Pelvis No Cont (11.29.24 @ 07:53) >  ACC: [id number] EXAM:  CT ABDOMEN AND PELVIS   ORDERED BY: LILIBETH HORTA     ACC: [id number] EXAM:  CT CHEST   ORDERED BY: LILIBETH HORTA     PROCEDURE DATE:  11/29/2024          INTERPRETATION:  CLINICAL INFORMATION: Sepsis.    COMPARISON: CT scan chest abdomen and pelvis 9/19/2023    CONTRAST/COMPLICATIONS:  IV Contrast: NONE  Oral Contrast: NONE      PROCEDURE:  CT of the Chest, Abdomen and Pelvis was performed.  Sagittal and coronal reformats were performed.    FINDINGS:    CHEST:    LUNGS AND LARGE AIRWAYS: PLEURA:    Small left pleural effusion with small loculated components. Aspect of   the fissure.  Partial left lower lobe atelectasis.    The central airways are patent.    VESSELS: Atherosclerotic changes thoracic aorta and coronary artery   calcification.    HEART:   Cardiomegaly.  Small to moderate pericardial effusion.    Small hiatal hernia.    MEDIASTINUM AND PATRICE: No lymphadenopathy.    CHEST WALL AND LOWER NECK:  Bilateral gynecomastia.    ABDOMEN AND PELVIS:    Streak artifact degrades image quality.    Evaluation of the solid organ parenchyma is limited without intravenous   contrast.    LIVER:  Approximately 5.5 cm low-density lesion anterior right hepatic lobe.  BILE DUCTS: Probable mild intrahepatic biliary ductal prominence.  GALLBLADDER: Prior cholecystectomy.  SPLEEN: Splenomegaly.  PANCREAS: Within normal limits.  ADRENALS: Within normal limits.  KIDNEYS/URETERS:  Atrophic right kidney.  Absent left kidney.  Right pelvic transplant kidney with mild fullness of the pelvicalyceal   system.    Metallic streak artifact related to patient's left hip arthroplasty   degrades image quality limiting evaluation of the pelvis.    BLADDER: Bladder wall thickening, correlate for cystitis.  REPRODUCTIVE ORGANS: Limited.    BOWEL:   There is colonic fecal retention, without bowel obstruction.  There is rectal wall thickening with perirectal and presacral   inflammatory stranding/fluid.  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  No extraluminal gas/air or drainable fluid collection is noted.  Appendix is normal.  PERITONEUM/RETROPERITONEUM:  Confluent soft tissue within the periaortic and aortocaval   retroperitoneum, similar to prior exam compatible with known   retroperitoneal fibrosis.    VESSELS: Atherosclerotic changes.  LYMPH NODES: No lymphadenopathy.    ABDOMINAL WALL:  Postsurgical changes.  Small bilateral fat-containing inguinal hernias.    BONES:  Patchy sclerosis and osteolysis throughout the bilateral sacrum with   pathologic fractures.There is soft tissue with stranding extending   throughout the presacral and posterior extraperitoneal pelvic soft   tissues.  There are a few bubbles of air/gas at the right sacral pathologic   fracture.    Partially imaged left hip arthroplasty.    Degenerative changes.    IMPRESSION:    Pathologic fractures of the bilateral sacrum with mottled   sclerosis/osteolysis, presacral/posterior pelvic extraperitoneal soft   tissue stranding and small bubbles of gas/air; findings suggestive of   infection/osteomyelitis.    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    Low-density lesion right hepatic lobe, which is poorly characterized on   this noncontrast exam.  Recommend further evaluation with MRI.    Above findings were discussed with Dr. Harrington at the time of interpretation   on 11/29/2024    Bladder wall thickening, correlate for cystitis.    Small left-sided pleural effusion with small loculated component.  Partial left lower lobe atelectasis.    Other findings as discussed above.    --- End of Report ---            RADU MITTAL MD; Attending Radiologist  This document has been electronically signed. Nov 29 2024  8:59AM    < end of copied text >

## 2024-12-06 NOTE — PROGRESS NOTE ADULT - SUBJECTIVE AND OBJECTIVE BOX
[facility] -- Tom Rahman, Osorio Castillo Savella, , Lisa Covington  Office # [phone]    Follow Up:  Uncontrolled HTN    Subjective/Observations: Asleep and difficult to awaken.  Verbally responsive but very limited.  Unable to provide ROS.   Comfortable on RA.  Not in any form of distress    REVIEW OF SYSTEMS: All other review of systems is negative unless indicated above  PAST MEDICAL & SURGICAL HISTORY:  Diabetes Mellitus Type II  Insulin pump (2010)      GERD (gastroesophageal reflux disease)      Depression      Hypothyroidism      Hyperlipidemia      Kidney transplanted  2012      Hypertension      ANGELIKA (obstructive sleep apnea)      Peripheral neuropathy      Shoulder fracture  Left.      History of colonoscopy      S/P kidney transplant  2012      S/P cholecystectomy      Retroperitoneal fibrosis  s/p surgery      AV fistula  Right arm      S/P right cataract extraction      S/P left cataract extraction      H/O unilateral nephrectomy  Left        MEDICATIONS  (STANDING):  amLODIPine   Tablet 10 milliGRAM(s) Oral daily  ascorbic acid 500 milliGRAM(s) Oral two times a day  aspirin enteric coated 81 milliGRAM(s) Oral daily  azaTHIOprine 50 milliGRAM(s) Oral two times a day  buPROPion XL (24-Hour) . 300 milliGRAM(s) Oral daily  carvedilol 12.5 milliGRAM(s) Oral every 12 hours  dextrose 5%. 1000 milliLiter(s) (50 mL/Hr) IV Continuous <Continuous>  dextrose 5%. 1000 milliLiter(s) (100 mL/Hr) IV Continuous <Continuous>  dextrose 50% Injectable 25 Gram(s) IV Push once  dextrose 50% Injectable 12.5 Gram(s) IV Push once  dextrose 50% Injectable 25 Gram(s) IV Push once  ertapenem  IVPB      ertapenem  IVPB 1000 milliGRAM(s) IV Intermittent every 24 hours  escitalopram 10 milliGRAM(s) Oral <User Schedule>  ferrous    sulfate 325 milliGRAM(s) Oral two times a day  glucagon  Injectable 1 milliGRAM(s) IntraMuscular once  hydrALAZINE 100 milliGRAM(s) Oral three times a day  insulin glargine Injectable (LANTUS) 20 Unit(s) SubCutaneous at bedtime  insulin lispro (ADMELOG) corrective regimen sliding scale   SubCutaneous three times a day before meals  levothyroxine 88 MICROGram(s) Oral <User Schedule>  lisinopril 40 milliGRAM(s) Oral daily  mineral oil enema 133 milliLiter(s) Rectal once  multivitamin/minerals/iron Oral Solution (CENTRUM) 15 milliLiter(s) Oral daily  pantoprazole    Tablet 40 milliGRAM(s) Oral two times a day  polyethylene glycol 3350 17 Gram(s) Oral daily  pyridoxine 50 milliGRAM(s) Oral daily  rosuvastatin 10 milliGRAM(s) Oral at bedtime  senna 2 Tablet(s) Oral at bedtime  sodium chloride 0.45%. 1000 milliLiter(s) (50 mL/Hr) IV Continuous <Continuous>  tacrolimus 2 milliGRAM(s) Oral daily  tacrolimus 1 milliGRAM(s) Oral at bedtime    MEDICATIONS  (PRN):  acetaminophen   IVPB .. 1000 milliGRAM(s) IV Intermittent every 8 hours PRN Temp greater or equal to 38C (100.4F)  aluminum hydroxide/magnesium hydroxide/simethicone Suspension 30 milliLiter(s) Oral every 4 hours PRN Dyspepsia  dextrose Oral Gel 15 Gram(s) Oral once PRN Blood Glucose LESS THAN 70 milliGRAM(s)/deciliter  hydrALAZINE Injectable 10 milliGRAM(s) IV Push every 8 hours PRN SBP >180 and HR 60-70bpm  melatonin 3 milliGRAM(s) Oral at bedtime PRN Insomnia  metoprolol tartrate Injectable 5 milliGRAM(s) IV Push every 6 hours PRN SBP >170  ondansetron Injectable 4 milliGRAM(s) IV Push every 8 hours PRN Nausea and/or Vomiting    Allergies    No Known Allergies    Intolerances      Vital Signs Last 24 Hrs  T(C): 36.8 (06 Dec 2024 10:30), Max: 36.8 (05 Dec 2024 11:07)  T(F): 98.3 (06 Dec 2024 10:30), Max: 98.3 (05 Dec 2024 11:07)  HR: 76 (06 Dec 2024 10:30) (76 - 80)  BP: 150/64 (06 Dec 2024 10:30) (148/62 - 197/70)  BP(mean): --  RR: 18 (06 Dec 2024 04:25) (18 - 18)  SpO2: 94% (06 Dec 2024 04:25) (94% - 97%)    Parameters below as of 06 Dec 2024 04:25  Patient On (Oxygen Delivery Method): room air      I&O's Summary    05 Dec 2024 07:01  -  06 Dec 2024 07:00  --------------------------------------------------------  IN: 0 mL / OUT: 450 mL / NET: -450 mL    PHYSICAL EXAM:  TELE: Not on tele  Constitutional: NAD, asleep   HEENT: Moist Mucous Membranes, Anicteric  Pulmonary: Non-labored, breath sounds are clear bilaterally, No wheezing, rales or rhonchi  Cardiovascular: Regular, S1 and S2, No murmurs, rubs, gallops or clicks  Gastrointestinal: Bowel Sounds present, soft, nontender.   Lymph: No peripheral edema. No lymphadenopathy.  Skin: No visible rashes or ulcers.  Psych:  Mood & affect: Flat, pleasantly confused with limited verbal response    LABS: All Labs Reviewed:                        7.8    4.90  )-----------( 339      ( 06 Dec 2024 06:50 )             23.9                         7.8    5.83  )-----------( 298      ( 05 Dec 2024 10:00 )             23.7                         6.6    x     )-----------( x        ( 04 Dec 2024 16:06 )             20.7     06 Dec 2024 06:50    136    |  103    |  19     ----------------------------<  276    4.0     |  30     |  0.77   05 Dec 2024 10:00    137    |  103    |  16     ----------------------------<  144    3.8     |  25     |  0.84   04 Dec 2024 09:16    137    |  103    |  20     ----------------------------<  318    4.2     |  27     |  0.90     Ca    11.1       06 Dec 2024 06:50  Ca    10.6       05 Dec 2024 10:00  Ca    10.6       04 Dec 2024 09:16            12 Lead ECG:   Ventricular Rate 81 BPM    Atrial Rate 81 BPM    P-R Interval 148 ms    QRS Duration 104 ms    Q-T Interval 406 ms    QTC Calculation(Bazett) 471 ms    P Axis 44 degrees    R Axis 12 degrees    T Axis 53 degrees    Diagnosis Line Normal sinus rhythm  Normal ECG  When compared with ECG of 29-NOV-2024 07:11,  Nonspecific T wave abnormality, improved in Lateral leads  Confirmed by Kya Gonzalez ([id number]) on 12/1/2024 10:43:18 AM (12-01-24 @ 09:28)    TRANSTHORACIC ECHOCARDIOGRAM REPORT  ________________________________________________________________________________                                      _______   Pt. Name:       JAN CALVIN Study Date:    11/29/2024  MRN:            [id number]          YOB: 1955  Accession #:    [id number]         Age:           69 years  Account#:       [id number]        Gender:        M  Heart Rate:                       Height:        64.17 in (163.00 cm)  Rhythm:       Weight:        169.75 lb (77.00 kg)  Blood Pressure: 196/81 mmHg       BSA/BMI:       1.83 m² / 28.98 kg/m²  ________________________________________________________________________________________  Referring Physician:    [id number] Ale Ibrahim  Interpreting Physician: Kya Gonzalez MD  Primary Sonographer:    Butch Salazar    CPT:               ECHO TTE WO CON COMP W DOPP - 93056.m  Indication(s):     Cardiac murmur, unspecified - R01.1  Procedure:         Transthoracic echocardiogram with 2-D, M-mode and complete                     spectral and color flow Doppler.  Ordering Location: [facility]  Admission Status:  ED  _______________________________________________________________________________________     CONCLUSIONS:      1. Left ventricular cavity is normal in size. Left ventricular systolic function is normal with an ejection fraction of 60 % by Moreno's method of disks.   2. Trace pericardial effusion noted adjacent to the posterior left ventricle.   3. Normal right ventricular cavity size and normal right ventricular systolic function.   4. Aortic valve anatomy cannot be determined with normal systolic excursion. There is calcification of the aortic valve leaflets.   5. Structurally normal mitral valve with normalleaflet excursion.   6. Structurally normal tricuspid valve with normal leaflet excursion. Trace tricuspid regurgitation.   7. The inferior vena cava is normal in size measuring 1.36 cm in diameter, (normal <2.1cm) with normal inspiratory collapse (normal >50%) consistent with normal right atrial pressure (~3, range 0-5mmHg).  ________________________________________________________________________________________  FINDINGS:     Left Ventricle:  The left ventricular cavity is normal in size. Left ventricular systolic function is normal with a calculated ejection fraction of 60 % by the Moreno's biplane method of disks.     Right Ventricle:  The right ventricular cavity is normal in size and right ventricular systolic function is normal. Tricuspid annular plane systolic excursion (TAPSE) is 2.9 cm (normal >=1.7 cm).     Left Atrium:  The left atrium is normal in size with an indexed volume of 33.15 ml/m².     Right Atrium:  The right atrium is normal in size with an indexed volume of 21.17 ml/m².     Interatrial Septum:  The interatrial septum appears intact.     Aortic Valve:  The aortic valve anatomy cannot be determined with normal systolic excursion. There is calcification of the aortic valve leaflets. The peak transaortic velocity is 2.15 m/s, peak transaortic gradient is 18.5 mmHg and mean transaortic gradient is 11.0 mmHg with an LVOT/aortic valve VTI ratio of 0.64. The aortic valve area is estimated at 2.67 cm² by the continuity equation. There is no evidence of aortic regurgitation.     Mitral Valve:  Structurally normal mitral valve with normal leaflet excursion. There is calcification of the mitral valve annulus.     Tricuspid Valve:  Structurally normal tricuspid valve with normal leaflet excursion. There is trace tricuspid regurgitation. Estimated pulmonary artery systolic pressure is 8 mmHg.     Aorta:  The aortic root at the sinuses of Valsalva is normal in size, measuring 3.50 cm (indexed 1.91 cm/m²). The ascending aorta is dilated, measuring 4.10 cm (indexed 2.24 cm/m²).     Pericardium:  There is a trace pericardial effusion noted adjacent to the posterior left ventricle.     Systemic Veins:  The inferior vena cava is normal in size measuring 1.36 cm in diameter, (normal <2.1cm) with normal inspiratory collapse (normal >50%) consistent with normal right atrial pressure (~3, range 0-5mmHg).  ____________________________________________________________________  QUANTITATIVE DATA:  Left Ventricle Measurements: (Indexed to BSA)     IVSd (2D):   1.0 cm  LVPWd (2D):  1.0 cm  LVIDd (2D):  5.3 cm  LVIDs (2D):  3.6 cm  LV Mass:     203 g  111.2 g/m²  LV Vol d, MOD A2C: 97.8 ml  53.50 ml/m²  LV Vol d, MOD A4C: 121.0 ml 66.19 ml/m²  LV Vol d, MOD BP:  109.5 ml 59.90 ml/m²  LV Vol s, MOD A2C: 36.4 ml  19.91 ml/m²  LV Vol s, MOD A4C: 49.5 ml  27.08 ml/m²  LV Vol s, MOD BP:  44.0 ml  24.04 ml/m²  LVOT SV MOD BP:    65.5 ml  LV EF% MOD BP:     60 %     MV E Vmax:    1.02 m/s  MV A Vmax:    0.93 m/s  MV E/A:       1.10  e' lateral:   13.10 cm/s  e' medial:    9.25 cm/s  E/e' lateral: 7.79  E/e' medial:  11.03  E/e' Average: 9.13  MV DT:        151 msec    Aorta Measurements: (Normal range) (Indexed to BSA)     Ao Root d     3.50 cm (3.1 - 3.7 cm) 1.91 cm/m²  Ao Asc d, 2D: 4.10  Ao Asc prox:  4.10 cm               2.24 cm/m²     Left Atrium Measurements: (Indexed to BSA)  LA Diam 2D: 4.40 cm    Right Ventricle Measurements: Right Atrial Measurements:     TAPSE:            2.9 cm      RA Vol:            38.70 ml  RV Base (RVID1):  3.7cm      RA Vol s, MOD A4C  38.7 ml  RV Mid (RVID2):   3.1 cm      RA Vol Index:      21.17 ml/m²  RV Major (RVID3): 8.0 cm      RA Area s, MOD A4C 14.7 cm²     LVOT / RVOT/ Qp/Qs Data: (Indexed to BSA)  LVOT Diameter:  2.30 cm  LVOT Area:      4.15cm²  LVOT Vmax:      1.34 m/s  LVOT Vmn:       0.949 m/s  LVOT VTI:       26.30 cm  LVOT peak grad: 7 mmHg  LVOT mean grad: 4.0 mmHg  LVOT SV:        109.3 ml  59.78 ml/m²    Aortic Valve Measurements:  AV Vmax:                2.2 m/s  AV Peak Gradient:       18.5 mmHg  AV Mean Gradient:       11.0 mmHg  AV VTI:                 41.0 cm  AV VTI Ratio:           0.64  AoV EOA, Contin:        2.67 cm²  AoV EOA, Contin i:      1.46 cm²/m²  AoV Dimensionless Index 0.64    Mitral Valve Measurements:     MV E Vmax: 1.0 m/s  MV A Vmax: 0.9 m/s  MV E/A:    1.1  Tricuspid Valve Measurements:     TR Vmax:          1.2 m/s  TR Peak Gradient: 5.3 mmHg  RA Pressure:      3 mmHg  PASP:             8 mmHg    ________________________________________________________________________________________  Electronically signed on 11/30/2024 at 1:57:15 PM by Kya Gonzalez MD         *** Final ***

## 2024-12-06 NOTE — PROGRESS NOTE ADULT - SUBJECTIVE AND OBJECTIVE BOX
[facility]    Remi Sampson NP    [address] [place] [address]  [phone]      Chief Complaint:  Patient is a 69y old  Male who presents with a chief complaint of AMS (06 Dec 2024 11:27)      HPI/ 24 hr events:   Patient seen and examined at bedside  Reports feeling well this morning   No acute GI complaints        REVIEW OF SYSTEMS:   General: Negative  HEENT: Negative  CV: Negative  Respiratory: Negative  GI: See HPI  : Negative  MSK: Negative  Hematologic: Negative  Skin: Negative    MEDICATIONS:   MEDICATIONS  (STANDING):  amLODIPine   Tablet 10 milliGRAM(s) Oral daily  ascorbic acid 500 milliGRAM(s) Oral two times a day  aspirin enteric coated 81 milliGRAM(s) Oral daily  azaTHIOprine 50 milliGRAM(s) Oral two times a day  buPROPion XL (24-Hour) . 300 milliGRAM(s) Oral daily  carvedilol 12.5 milliGRAM(s) Oral every 12 hours  cloNIDine 0.1 milliGRAM(s) Oral every 12 hours  dextrose 5%. 1000 milliLiter(s) (100 mL/Hr) IV Continuous <Continuous>  dextrose 5%. 1000 milliLiter(s) (50 mL/Hr) IV Continuous <Continuous>  dextrose 50% Injectable 25 Gram(s) IV Push once  dextrose 50% Injectable 12.5 Gram(s) IV Push once  dextrose 50% Injectable 25 Gram(s) IV Push once  ertapenem  IVPB      ertapenem  IVPB 1000 milliGRAM(s) IV Intermittent every 24 hours  escitalopram 10 milliGRAM(s) Oral <User Schedule>  ferrous    sulfate 325 milliGRAM(s) Oral two times a day  glucagon  Injectable 1 milliGRAM(s) IntraMuscular once  hydrALAZINE 100 milliGRAM(s) Oral three times a day  insulin glargine Injectable (LANTUS) 20 Unit(s) SubCutaneous at bedtime  insulin lispro (ADMELOG) corrective regimen sliding scale   SubCutaneous three times a day before meals  levothyroxine 88 MICROGram(s) Oral <User Schedule>  lisinopril 40 milliGRAM(s) Oral daily  mineral oil enema 133 milliLiter(s) Rectal once  multivitamin/minerals/iron Oral Solution (CENTRUM) 15 milliLiter(s) Oral daily  pamidronate IVPB 60 milliGRAM(s) IV Intermittent once  pantoprazole    Tablet 40 milliGRAM(s) Oral two times a day  polyethylene glycol 3350 17 Gram(s) Oral daily  pyridoxine 50 milliGRAM(s) Oral daily  rosuvastatin 10 milliGRAM(s) Oral at bedtime  senna 2 Tablet(s) Oral at bedtime  tacrolimus 2 milliGRAM(s) Oral daily  tacrolimus 1 milliGRAM(s) Oral at bedtime    MEDICATIONS  (PRN):  acetaminophen   IVPB .. 1000 milliGRAM(s) IV Intermittent every 8 hours PRN Temp greater or equal to 38C (100.4F)  aluminum hydroxide/magnesium hydroxide/simethicone Suspension 30 milliLiter(s) Oral every 4 hours PRN Dyspepsia  dextrose Oral Gel 15 Gram(s) Oral once PRN Blood Glucose LESS THAN 70 milliGRAM(s)/deciliter  hydrALAZINE Injectable 10 milliGRAM(s) IV Push every 8 hours PRN SBP >180 and HR 60-70bpm  melatonin 3 milliGRAM(s) Oral at bedtime PRN Insomnia  metoprolol tartrate Injectable 5 milliGRAM(s) IV Push every 6 hours PRN SBP >170  ondansetron Injectable 4 milliGRAM(s) IV Push every 8 hours PRN Nausea and/or Vomiting      ALLERGIES:   Allergies    No Known Allergies    Intolerances        VITAL SIGNS:   Vital Signs Last 24 Hrs  T(C): 36.8 (06 Dec 2024 10:30), Max: 36.8 (06 Dec 2024 10:30)  T(F): 98.3 (06 Dec 2024 10:30), Max: 98.3 (06 Dec 2024 10:30)  HR: 76 (06 Dec 2024 10:30) (76 - 80)  BP: 150/64 (06 Dec 2024 10:30) (148/62 - 188/70)  BP(mean): --  RR: 18 (06 Dec 2024 04:25) (18 - 18)  SpO2: 94% (06 Dec 2024 04:25) (94% - 96%)    Parameters below as of 06 Dec 2024 04:25  Patient On (Oxygen Delivery Method): room air      I&O's Summary    05 Dec 2024 07:01  -  06 Dec 2024 07:00  --------------------------------------------------------  IN: 0 mL / OUT: 450 mL / NET: -450 mL    06 Dec 2024 07:01  -  06 Dec 2024 12:01  --------------------------------------------------------  IN: 0 mL / OUT: 500 mL / NET: -500 mL        PHYSICAL EXAM:   GENERAL:  No acute distress  HEENT:  NC/AT  CHEST:  No increased effort  HEART:  Regular rate  ABDOMEN:  Soft, non-tender, non-distended, positive bowel sounds  EXTREMITIES: No edema, no cyanosis  SKIN:  Warm, dry  NEURO:  Calm, cooperative    LABS:                        7.8    4.90  )-----------( 339      ( 06 Dec 2024 06:50 )             23.9     12-06    136  |  103  |  19  ----------------------------<  276[H]  4.0   |  30  |  0.77    Ca    11.1[H]      06 Dec 2024 06:50                Hepatitis B Core Antibody, Total: Nonreact (12-04-24 @ 16:06)  Hepatitis B Surface Antibody: Reactive (12-04-24 @ 16:06)  Hepatitis B Surface Antigen: Nonreact (12-04-24 @ 16:06)  Hepatitis C Virus S/CO Ratio: 0.29 S/CO (12-04-24 @ 16:06)                            RADIOLOGY & ADDITIONAL STUDIES:

## 2024-12-06 NOTE — PROGRESS NOTE ADULT - NSPROGADDITIONALINFOA_GEN_ALL_CORE
liver biopsy  liver mass  NUMAC 10/31/24- lymphocytic infiltrate with lobular necroinflammatory process composed of lymphocyte plasma cell and occasional neutrophil and  eosinophil  bile duct  bile duct inflammation with patchy area of proliferation   .

## 2024-12-06 NOTE — PROGRESS NOTE ADULT - ASSESSMENT
Patient is a 68yo M, PMH DM, HTN, HLD, h/o kidney transplant, hypothyroidism, presents to ED from [facility] for AMS likely  acute  metabolic encephalopathy       AMS 2/2 Sepsis 2/2 multiple infections (UTI, sacral infection, possible osteomyelitis), E coli bacteremia  cause of acute  metabolic encephalopathy   Sepsis 2/2 ESBL Ecoli UTI and bacteremia. sensitive to ertapenem.  - C/W  Meropenum 1gm q24h x10 day course until 12/10 d/w  ID Dr. Saul with  midline   -Tylenol PRN pain and fever- diet as tolerated   - aspiration and fall risk - Continue Senna, Miralax, PRN dulcolax suppositories   - MR pelvis/sacrum to eval for osteomyelitis- last    Again seen are comminuted bilateral sacral lona fractures with marked   osseous edema and marked loss of normal T1 fatty marrow. Osseous edema   with loss of normal T1 fatty marrow at the caudal aspect of the left   posterior iliac bone adjacent to the sacroiliac joint. Previously seen   fracture component is better visualized on CT. These findings could be   secondary to extensive fracture deformities in an osteopenic patient   versus osteomyelitis if there was a history of a soft tissue ulcer   extending to bone versus metastatic disease given the marked loss of T1   fatty marrow if there is a history of malignancy. Clinical correlation   with patient history is advised.  - Per Ortho no surgical intervention for fracture of sacrum  / nees dolores     Acute on Chronic anemia :  - Likely due to infection,  and CKD- No signs of bleeding noted    - Iron22/ sat low    iron deficiency with chr anemia  ,  occult blood  - no bm   -- D/w Hem Dr. Sal  With prior renal transplant would use irradiated PRBC and try to keep transfusions to a minimum.     s/p   1 unit prbc - hb stable   Hepatic lesion  CT abd with 5.5cm lesion on R hepatic lobe  wife known mass it benin last biopsy -   but hem/onc   DR SAL would like to  repeat   liver biopsy by ir next wk  -  as histopath not full reported by  [facility].     Diabetes Mellitus / hyperglycemia  dm DietPre meal Insulin  3 unit tid   Lantus to 20 u QHS  fingerstick glucose closely follow up.   ISS A1c 7.4 -     HTN: Uncontrolled   Continue Lisinopril 20mg  increase to 40 mg  daily with hold parameters  Continue Hydralazine 100mg TID with hold parameters  Continue Coreg, switched from Metoprolol - 12.5 mg po bid  Continue Amlodipine 10mg daily - fu bp closely   add on clonidine   HLD  Continue Crestor 10mg daily    s/p Kidney transplant/CKD 3  Low mag and PO4, replaced  Continue Tacrolimus 2mg daily  Continue Tacrolimus 1mg QHS  Continue Azathioprine 50mg BID  Nephrology consulted dr vizcarra     Hypothyroidism  Continue Levothyroxine 88mcg QAM     hypercalcemia was possible  underlying  lymphoproliferative dis  sec to vit D  s/p  calcitonin   on  Aredia - fu ca in am.  Depression  Continue Escitalopram 10mg TID  Continue Bupropion 300mg daily    liver mass and spleen mass as per radiologist as per wife old diagnostic     DVT ppx: Hold AC due to anemia and risk of bleeding       wife  bedside  aware tt/plan        Dispo: JOCELYNN bernal

## 2024-12-06 NOTE — PROGRESS NOTE ADULT - SUBJECTIVE AND OBJECTIVE BOX
[facility]                                                       Dr. Bruce, Dr. Prabhakar, Dr. Cabrales, Dr. Zaragoza, Dr. Bingham, Dr. Loya                                      [facility], [facility], [address]                                                 [address] [address]                                      Ph: [phone]  Fax: [phone]                                         Ph: [phone]  Fax: [phone]      Patient is a 69y old  Male who presents with a chief complaint of AMS (01 Dec 2024 11:19)  Patient seen in follow up for CKD, h/o Kidney transplant.        PAST MEDICAL HISTORY:  Diabetes Mellitus Type II    ESRD on Dialysis    GERD (gastroesophageal reflux disease)    Depression    Hypothyroidism    Hyperlipidemia    Kidney transplanted    Hypertension    ANGELIKA (obstructive sleep apnea)    Peripheral neuropathy    Shoulder fracture      MEDICATIONS  (STANDING):  amLODIPine   Tablet 10 milliGRAM(s) Oral daily  ascorbic acid 500 milliGRAM(s) Oral two times a day  aspirin enteric coated 81 milliGRAM(s) Oral daily  azaTHIOprine 50 milliGRAM(s) Oral two times a day  buPROPion XL (24-Hour) . 300 milliGRAM(s) Oral daily  carvedilol 12.5 milliGRAM(s) Oral every 12 hours  cloNIDine 0.1 milliGRAM(s) Oral every 12 hours  dextrose 5%. 1000 milliLiter(s) (100 mL/Hr) IV Continuous <Continuous>  dextrose 5%. 1000 milliLiter(s) (50 mL/Hr) IV Continuous <Continuous>  dextrose 50% Injectable 25 Gram(s) IV Push once  dextrose 50% Injectable 12.5 Gram(s) IV Push once  dextrose 50% Injectable 25 Gram(s) IV Push once  ertapenem  IVPB      ertapenem  IVPB 1000 milliGRAM(s) IV Intermittent every 24 hours  escitalopram 10 milliGRAM(s) Oral <User Schedule>  ferrous    sulfate 325 milliGRAM(s) Oral two times a day  glucagon  Injectable 1 milliGRAM(s) IntraMuscular once  hydrALAZINE 100 milliGRAM(s) Oral three times a day  insulin glargine Injectable (LANTUS) 20 Unit(s) SubCutaneous at bedtime  insulin lispro (ADMELOG) corrective regimen sliding scale   SubCutaneous three times a day before meals  levothyroxine 88 MICROGram(s) Oral <User Schedule>  lisinopril 40 milliGRAM(s) Oral daily  mineral oil enema 133 milliLiter(s) Rectal once  multivitamin/minerals/iron Oral Solution (CENTRUM) 15 milliLiter(s) Oral daily  pantoprazole    Tablet 40 milliGRAM(s) Oral two times a day  polyethylene glycol 3350 17 Gram(s) Oral daily  pyridoxine 50 milliGRAM(s) Oral daily  rosuvastatin 10 milliGRAM(s) Oral at bedtime  senna 2 Tablet(s) Oral at bedtime  tacrolimus 2 milliGRAM(s) Oral daily  tacrolimus 1 milliGRAM(s) Oral at bedtime    MEDICATIONS  (PRN):  acetaminophen   IVPB .. 1000 milliGRAM(s) IV Intermittent every 8 hours PRN Temp greater or equal to 38C (100.4F)  aluminum hydroxide/magnesium hydroxide/simethicone Suspension 30 milliLiter(s) Oral every 4 hours PRN Dyspepsia  dextrose Oral Gel 15 Gram(s) Oral once PRN Blood Glucose LESS THAN 70 milliGRAM(s)/deciliter  hydrALAZINE Injectable 10 milliGRAM(s) IV Push every 8 hours PRN SBP >180 and HR 60-70bpm  melatonin 3 milliGRAM(s) Oral at bedtime PRN Insomnia  metoprolol tartrate Injectable 5 milliGRAM(s) IV Push every 6 hours PRN SBP >170  ondansetron Injectable 4 milliGRAM(s) IV Push every 8 hours PRN Nausea and/or Vomiting    T(C): 36.8 (12-06-24 @ 10:30), Max: 37.2 (12-04-24 @ 19:13)  HR: 76 (12-06-24 @ 10:30) (71 - 80)  BP: 150/64 (12-06-24 @ 10:30) (144/67 - 197/70)  RR: 18 (12-06-24 @ 04:25)  SpO2: 94% (12-06-24 @ 04:25)  Wt(kg): --  I&O's Detail    05 Dec 2024 07:01  -  06 Dec 2024 07:00  --------------------------------------------------------  IN:  Total IN: 0 mL    OUT:    Voided (mL): 450 mL  Total OUT: 450 mL    Total NET: -450 mL              PHYSICAL EXAM:  General: No distress  Respiratory: b/l air entry  Cardiovascular: S1 S2  Gastrointestinal: soft  Extremities:  no edema        LABORATORY:                        7.8    4.90  )-----------( 339      ( 06 Dec 2024 06:50 )             23.9     12-06    136  |  103  |  19  ----------------------------<  276[H]  4.0   |  30  |  0.77    Ca    11.1[H]      06 Dec 2024 06:50      Sodium: 136 mmol/L (12-06 @ 06:50)  Sodium: 137 mmol/L (12-05 @ 10:00)    Potassium: 4.0 mmol/L (12-06 @ 06:50)  Potassium: 3.8 mmol/L (12-05 @ 10:00)    Hemoglobin: 7.8 g/dL (12-06 @ 06:50)  Hemoglobin: 7.8 g/dL (12-05 @ 10:00)  Hemoglobin: 6.6 g/dL (12-04 @ 16:06)  Hemoglobin: 6.7 g/dL (12-04 @ 10:46)    Creatinine, Serum 0.77 (12-06 @ 06:50)  Creatinine, Serum 0.84 (12-05 @ 10:00)  Creatinine, Serum 0.90 (12-04 @ 09:16)          Urinalysis Basic - ( 06 Dec 2024 06:50 )    Color: x / Appearance: x / SG: x / pH: x  Gluc: 276 mg/dL / Ketone: x  / Bili: x / Urobili: x   Blood: x / Protein: x / Nitrite: x   Leuk Esterase: x / RBC: x / WBC x   Sq Epi: x / Non Sq Epi: x / Bacteria: x

## 2024-12-06 NOTE — PROGRESS NOTE ADULT - ASSESSMENT
Abnormal imaging  Anemia    CBC noted  Transfuse prn  PPI for ppx  Monitor for any overt GI bleeding  MRI non-contrast noted  Will need MR abdomen w/wo IV contrast if within GOC, if it was already done as outpatient need to obtain records  Will follow up    I reviewed the overnight course of events on the unit, re-confirming the patient history. I discussed the care with the patient.  Differential diagnosis and plan of care discussed with patient after the evaluation.  35 minutes spent on total encounter of which more than fifty percent of the encounter was spent counseling and/or coordinating care by the attending physician. Abnormal imaging  Anemia    Recommendations  - Recent liver biopsy from [facility] reviewed: diffuse lymphoplasmacytic infiltrated with lobular necroinflammatory processportal and periportal fibrosis  - Heme onc following, recommends IR review for possible repeat biopsy  - CBC noted  - Transfuse prn  - PPI for ppx  - Monitor for any overt GI bleeding  - MRI non-contrast noted  - Will need MR abdomen w/wo IV contrast if within GOC, if it was already done as outpatient  - GI to follow       I reviewed the overnight course of events on the unit, re-confirming the patient history. I discussed the care with the patient.  Differential diagnosis and plan of care discussed with patient after the evaluation.  35 minutes spent on total encounter of which more than fifty percent of the encounter was spent counseling and/or coordinating care by the attending physician. Abnormal imaging  Anemia    Recommendations  - Recent liver biopsy from [facility] reviewed: 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  active chronic hepatitis, with extensive necrotic inflammatory process and fibrosis  - Heme onc following  - CBC noted  - Transfuse prn  - PPI for ppx  - Monitor for any overt GI bleeding  - MRI non-contrast noted  - Will need MR abdomen w/wo IV contrast if within GOC, if it was already done as outpatient  - GI to follow       I reviewed the overnight course of events on the unit, re-confirming the patient history. I discussed the care with the patient.  Differential diagnosis and plan of care discussed with patient after the evaluation.  35 minutes spent on total encounter of which more than fifty percent of the encounter was spent counseling and/or coordinating care by the attending physician.

## 2024-12-07 LAB
ANION GAP SERPL CALC-SCNC: 4 MMOL/L — LOW (ref 5–17)
BUN SERPL-MCNC: 21 MG/DL — SIGNIFICANT CHANGE UP (ref 7–23)
CALCIUM SERPL-MCNC: 11 MG/DL — HIGH (ref 8.5–10.1)
CHLORIDE SERPL-SCNC: 101 MMOL/L — SIGNIFICANT CHANGE UP (ref 96–108)
CO2 SERPL-SCNC: 31 MMOL/L — SIGNIFICANT CHANGE UP (ref 22–31)
CREAT SERPL-MCNC: 0.79 MG/DL — SIGNIFICANT CHANGE UP (ref 0.5–1.3)
EGFR: 97 ML/MIN/1.73M2 — SIGNIFICANT CHANGE UP
GLUCOSE BLDC GLUCOMTR-MCNC: 173 MG/DL — HIGH (ref 70–99)
GLUCOSE BLDC GLUCOMTR-MCNC: 260 MG/DL — HIGH (ref 70–99)
GLUCOSE BLDC GLUCOMTR-MCNC: 274 MG/DL — HIGH (ref 70–99)
GLUCOSE BLDC GLUCOMTR-MCNC: 298 MG/DL — HIGH (ref 70–99)
GLUCOSE SERPL-MCNC: 195 MG/DL — HIGH (ref 70–99)
HCT VFR BLD CALC: 23.6 % — LOW (ref 39–50)
HGB BLD-MCNC: 7.5 G/DL — LOW (ref 13–17)
MCHC RBC-ENTMCNC: 29.2 PG — SIGNIFICANT CHANGE UP (ref 27–34)
MCHC RBC-ENTMCNC: 31.8 G/DL — LOW (ref 32–36)
MCV RBC AUTO: 91.8 FL — SIGNIFICANT CHANGE UP (ref 80–100)
NRBC # BLD: 0 /100 WBCS — SIGNIFICANT CHANGE UP (ref 0–0)
PLATELET # BLD AUTO: 365 K/UL — SIGNIFICANT CHANGE UP (ref 150–400)
POTASSIUM SERPL-MCNC: 4.2 MMOL/L — SIGNIFICANT CHANGE UP (ref 3.5–5.3)
POTASSIUM SERPL-SCNC: 4.2 MMOL/L — SIGNIFICANT CHANGE UP (ref 3.5–5.3)
RBC # BLD: 2.57 M/UL — LOW (ref 4.2–5.8)
RBC # FLD: 19.7 % — HIGH (ref 10.3–14.5)
S PNEUM AG UR QL: NEGATIVE — SIGNIFICANT CHANGE UP
SODIUM SERPL-SCNC: 136 MMOL/L — SIGNIFICANT CHANGE UP (ref 135–145)
WBC # BLD: 4.46 K/UL — SIGNIFICANT CHANGE UP (ref 3.8–10.5)
WBC # FLD AUTO: 4.46 K/UL — SIGNIFICANT CHANGE UP (ref 3.8–10.5)

## 2024-12-07 PROCEDURE — 99232 SBSQ HOSP IP/OBS MODERATE 35: CPT

## 2024-12-07 PROCEDURE — 99233 SBSQ HOSP IP/OBS HIGH 50: CPT | Mod: GC

## 2024-12-07 RX ADMIN — ESCITALOPRAM OXALATE 10 MILLIGRAM(S): 10 TABLET, FILM COATED ORAL at 05:05

## 2024-12-07 RX ADMIN — PANTOPRAZOLE SODIUM 40 MILLIGRAM(S): 40 TABLET, DELAYED RELEASE ORAL at 05:05

## 2024-12-07 RX ADMIN — AZATHIOPRINE 50 MILLIGRAM(S): 100 TABLET ORAL at 17:19

## 2024-12-07 RX ADMIN — Medication 88 MICROGRAM(S): at 05:05

## 2024-12-07 RX ADMIN — Medication 300 MILLIGRAM(S): at 12:02

## 2024-12-07 RX ADMIN — HYDRALAZINE HYDROCHLORIDE 100 MILLIGRAM(S): 10 TABLET ORAL at 05:04

## 2024-12-07 RX ADMIN — HYDRALAZINE HYDROCHLORIDE 100 MILLIGRAM(S): 10 TABLET ORAL at 21:49

## 2024-12-07 RX ADMIN — Medication 15 MILLILITER(S): at 12:02

## 2024-12-07 RX ADMIN — CARVEDILOL 12.5 MILLIGRAM(S): 25 TABLET, FILM COATED ORAL at 05:04

## 2024-12-07 RX ADMIN — Medication 500 MILLIGRAM(S): at 05:04

## 2024-12-07 RX ADMIN — ERTAPENEM 120 MILLIGRAM(S): 1 INJECTION, POWDER, LYOPHILIZED, FOR SOLUTION INTRAMUSCULAR; INTRAVENOUS at 05:04

## 2024-12-07 RX ADMIN — Medication 6: at 17:25

## 2024-12-07 RX ADMIN — CLONIDINE HYDROCHLORIDE 0.1 MILLIGRAM(S): 0.3 TABLET ORAL at 17:19

## 2024-12-07 RX ADMIN — ESCITALOPRAM OXALATE 10 MILLIGRAM(S): 10 TABLET, FILM COATED ORAL at 17:20

## 2024-12-07 RX ADMIN — Medication 6: at 08:23

## 2024-12-07 RX ADMIN — Medication 325 MILLIGRAM(S): at 05:04

## 2024-12-07 RX ADMIN — Medication 500 MILLIGRAM(S): at 17:19

## 2024-12-07 RX ADMIN — PANTOPRAZOLE SODIUM 40 MILLIGRAM(S): 40 TABLET, DELAYED RELEASE ORAL at 17:20

## 2024-12-07 RX ADMIN — Medication 325 MILLIGRAM(S): at 17:20

## 2024-12-07 RX ADMIN — ESCITALOPRAM OXALATE 10 MILLIGRAM(S): 10 TABLET, FILM COATED ORAL at 12:05

## 2024-12-07 RX ADMIN — HYDRALAZINE HYDROCHLORIDE 100 MILLIGRAM(S): 10 TABLET ORAL at 13:17

## 2024-12-07 RX ADMIN — INSULIN GLARGINE 20 UNIT(S): 100 INJECTION, SOLUTION SUBCUTANEOUS at 21:49

## 2024-12-07 RX ADMIN — Medication 50 MILLIGRAM(S): at 12:04

## 2024-12-07 RX ADMIN — Medication 2 TABLET(S): at 21:49

## 2024-12-07 RX ADMIN — AMLODIPINE BESYLATE 10 MILLIGRAM(S): 10 TABLET ORAL at 05:04

## 2024-12-07 RX ADMIN — TACROLIMUS 1 MILLIGRAM(S): 5 CAPSULE ORAL at 21:49

## 2024-12-07 RX ADMIN — CLONIDINE HYDROCHLORIDE 0.1 MILLIGRAM(S): 0.3 TABLET ORAL at 05:05

## 2024-12-07 RX ADMIN — AZATHIOPRINE 50 MILLIGRAM(S): 100 TABLET ORAL at 06:07

## 2024-12-07 RX ADMIN — TACROLIMUS 2 MILLIGRAM(S): 5 CAPSULE ORAL at 12:04

## 2024-12-07 RX ADMIN — CARVEDILOL 12.5 MILLIGRAM(S): 25 TABLET, FILM COATED ORAL at 17:19

## 2024-12-07 RX ADMIN — ROSUVASTATIN CALCIUM 10 MILLIGRAM(S): 5 TABLET, FILM COATED ORAL at 21:49

## 2024-12-07 RX ADMIN — LISINOPRIL 40 MILLIGRAM(S): 20 TABLET ORAL at 06:08

## 2024-12-07 RX ADMIN — Medication 2: at 12:23

## 2024-12-07 RX ADMIN — POLYETHYLENE GLYCOL 3350 17 GRAM(S): 17 POWDER, FOR SOLUTION ORAL at 12:02

## 2024-12-07 NOTE — PROGRESS NOTE ADULT - ASSESSMENT
IMPRESSION  Anemia multifactorial in etiology related to prior renal transplant with meds and now with osteomyelitis and sepsis with Gram neg maury sepsis  Blood and urine culture with E. Coli  Hgb 6.8==>7.3 ==>7.1 g/dL after receiving 1 unit PRBC  expect to be transfusion dependent with active infection    Ferritin 885, iron 22, TIBC 164, sat 14%  PSA 12.3    ,     5cm liver mass  Prior known, s/p liver bx at [facility], wife brought in report which shows diffuse lymphoplasmacytic infiltrated with lobular necroinflammatory processportal and periportal fibrosis also noted    MGUS  IgM-L  hx of splenomegaly 14cm since 2018    With abnormal MRI, IgM lambda monoclonal gammapathy, increasing splenomegaly and liver lesion pathology needs a repeat biopsy for potential lymphoplasmacytic malignant diagnosis    RECOMMENDATIONS    Anemia  Follow CBC and transfuse as indicated  recommend conservative mgmt of anemia.   With prior renal transplant would use irradiated PRBC and try to keep transfusions to a minimum.     MGUS and hypercalcemia  VitD1,25OH 102.0, VitD 39.2  Continue renal evaluation.   Note calcium increased 11.7==>11.2==>10.6  discussed with Dr. Bruce  Start calcitonin x48hr    biopsy does not appear to rule out lymphoma  to have MRI with contrast  to have films review by IR for possible repeat biopsy    continue ID and orthopedic management of osteomyelitis    d/w Dr Upton

## 2024-12-07 NOTE — PROGRESS NOTE ADULT - SUBJECTIVE AND OBJECTIVE BOX
Subjective: no complaints.       MEDICATIONS  (STANDING):  amLODIPine   Tablet 10 milliGRAM(s) Oral daily  ascorbic acid 500 milliGRAM(s) Oral two times a day  azaTHIOprine 50 milliGRAM(s) Oral two times a day  buPROPion XL (24-Hour) . 300 milliGRAM(s) Oral daily  carvedilol 12.5 milliGRAM(s) Oral every 12 hours  cloNIDine 0.1 milliGRAM(s) Oral every 12 hours  dextrose 5%. 1000 milliLiter(s) (50 mL/Hr) IV Continuous <Continuous>  dextrose 5%. 1000 milliLiter(s) (100 mL/Hr) IV Continuous <Continuous>  dextrose 50% Injectable 25 Gram(s) IV Push once  dextrose 50% Injectable 12.5 Gram(s) IV Push once  dextrose 50% Injectable 25 Gram(s) IV Push once  ertapenem  IVPB      ertapenem  IVPB 1000 milliGRAM(s) IV Intermittent every 24 hours  escitalopram 10 milliGRAM(s) Oral <User Schedule>  ferrous    sulfate 325 milliGRAM(s) Oral two times a day  glucagon  Injectable 1 milliGRAM(s) IntraMuscular once  hydrALAZINE 100 milliGRAM(s) Oral three times a day  insulin glargine Injectable (LANTUS) 20 Unit(s) SubCutaneous at bedtime  insulin lispro (ADMELOG) corrective regimen sliding scale   SubCutaneous three times a day before meals  insulin lispro Injectable (ADMELOG) 3 Unit(s) SubCutaneous three times a day before meals  levothyroxine 88 MICROGram(s) Oral <User Schedule>  lisinopril 40 milliGRAM(s) Oral daily  mineral oil enema 133 milliLiter(s) Rectal once  multivitamin/minerals/iron Oral Solution (CENTRUM) 15 milliLiter(s) Oral daily  pantoprazole    Tablet 40 milliGRAM(s) Oral two times a day  polyethylene glycol 3350 17 Gram(s) Oral daily  pyridoxine 50 milliGRAM(s) Oral daily  rosuvastatin 10 milliGRAM(s) Oral at bedtime  senna 2 Tablet(s) Oral at bedtime  tacrolimus 2 milliGRAM(s) Oral daily  tacrolimus 1 milliGRAM(s) Oral at bedtime    MEDICATIONS  (PRN):  acetaminophen   IVPB .. 1000 milliGRAM(s) IV Intermittent every 8 hours PRN Temp greater or equal to 38C (100.4F)  aluminum hydroxide/magnesium hydroxide/simethicone Suspension 30 milliLiter(s) Oral every 4 hours PRN Dyspepsia  dextrose Oral Gel 15 Gram(s) Oral once PRN Blood Glucose LESS THAN 70 milliGRAM(s)/deciliter  hydrALAZINE Injectable 10 milliGRAM(s) IV Push every 8 hours PRN SBP >180 and HR 60-70bpm  melatonin 3 milliGRAM(s) Oral at bedtime PRN Insomnia  metoprolol tartrate Injectable 5 milliGRAM(s) IV Push every 6 hours PRN SBP >170  ondansetron Injectable 4 milliGRAM(s) IV Push every 8 hours PRN Nausea and/or Vomiting          T(C): 36.4 (12-07-24 @ 10:31), Max: 36.8 (12-06-24 @ 17:00)  HR: 72 (12-07-24 @ 10:31) (69 - 72)  BP: 162/63 (12-07-24 @ 10:54) (136/67 - 182/70)  RR: 16 (12-07-24 @ 10:31) (16 - 20)  SpO2: 95% (12-07-24 @ 10:31) (95% - 97%)  Wt(kg): --        I&O's Detail    06 Dec 2024 07:01  -  07 Dec 2024 07:00  --------------------------------------------------------  IN:    IV PiggyBack: 50 mL  Total IN: 50 mL    OUT:    Voided (mL): 1900 mL  Total OUT: 1900 mL    Total NET: -1850 mL               PHYSICAL EXAM:    GENERAL: NAD  NECK: Supple, no inc in JVP  CHEST/LUNG: Clear  HEART: S1S2  ABDOMEN: Soft, non tender allograft  EXTREMITIES:  no edema.   NEURO: no asterixis      LABS:  CBC Full  -  ( 06 Dec 2024 06:50 )  WBC Count : 4.90 K/uL  RBC Count : 2.69 M/uL  Hemoglobin : 7.8 g/dL  Hematocrit : 23.9 %  Platelet Count - Automated : 339 K/uL  Mean Cell Volume : 88.8 fl  Mean Cell Hemoglobin : 29.0 pg  Mean Cell Hemoglobin Concentration : 32.6 g/dL  Auto Neutrophil # : 4.26 K/uL  Auto Lymphocyte # : 0.24 K/uL  Auto Monocyte # : 0.35 K/uL  Auto Eosinophil # : 0.01 K/uL  Auto Basophil # : 0.01 K/uL  Auto Neutrophil % : 87.0 %  Auto Lymphocyte % : 4.9 %  Auto Monocyte % : 7.1 %  Auto Eosinophil % : 0.2 %  Auto Basophil % : 0.2 %    12-06    136  |  103  |  19  ----------------------------<  276[H]  4.0   |  30  |  0.77    Ca    11.1[H]      06 Dec 2024 06:50        Impression:  1.CKD 3, h/o Kidney transplant ~2012, h/o Left Nephrectomy  2.Diabetes  3.Hypertension  4.Sepsis, UTI, Sacral osteomyelitis, Gram negative bacteremia  5.HyperCa. PTH appropriately suppressed. Elevated 1.25 Vit D suggests excess hydroxylase.     Recommendations:   Would continue saline to induce calciuresis.   Check LDH, ACE  Adjust for Alb when calculating serum Ca

## 2024-12-07 NOTE — PROGRESS NOTE ADULT - TIME BILLING
Note written by attending. Patient seen and examined. Meds, labs, vitals, chart reviewed.   pt have liver mass as per  hem/onc dr corona will need repeat  liver biopsy  next wk / dc asa . Note written by attending. Patient seen and examined. Meds, labs, vitals, chart reviewed.   pt have liver mass as per  hem/onc dr corona will need repeat  liver biopsy  next wk / dc asa  and mri abd  for retroperitoneal fib rosis .

## 2024-12-07 NOTE — CHART NOTE - NSCHARTNOTEFT_GEN_A_CORE
Assessment:   Pt seen for nutrition follow up.  Chart reviewed, hospital course noted.     Brief History:   "Patient is a 70yo M with a  PMH DM, HTN, HLD, h/o kidney transplant, hypothyroidism, presents to ED from [facility] for AMS likely  acute  metabolic encephalopathy " 2/2 sepsis    Pt not able to provide any meaningful information, per flow sheets, intake varies but appears to be suboptimal.  Ensure Max bid ordered.   BM 12/5    Factors impacting intake: [ ] none [ ] nausea  [ ] vomiting [ ] diarrhea [ ] constipation  [ ]chewing problems [ ] swallowing issues  [x] other: AMS    Diet Prescription: Diet, Soft and Bite Sized:   Consistent Carbohydrate {Evening Snack}  DASH/TLC {Sodium & Cholesterol Restricted}  Supplement Feeding Modality:  Oral  Ensure Max Cans or Servings Per Day:  1       Frequency:  Two Times a day (12-02-24 @ 15:27) [Active]    Current Weight: 12/2 186.2#, 12/7 175.7#      Pertinent Medications: MEDICATIONS  (STANDING):  amLODIPine   Tablet 10 milliGRAM(s) Oral daily  ascorbic acid 500 milliGRAM(s) Oral two times a day  azaTHIOprine 50 milliGRAM(s) Oral two times a day  buPROPion XL (24-Hour) . 300 milliGRAM(s) Oral daily  carvedilol 12.5 milliGRAM(s) Oral every 12 hours  cloNIDine 0.1 milliGRAM(s) Oral every 12 hours  dextrose 5%. 1000 milliLiter(s) (50 mL/Hr) IV Continuous <Continuous>  dextrose 5%. 1000 milliLiter(s) (100 mL/Hr) IV Continuous <Continuous>  dextrose 50% Injectable 25 Gram(s) IV Push once  dextrose 50% Injectable 12.5 Gram(s) IV Push once  dextrose 50% Injectable 25 Gram(s) IV Push once  ertapenem  IVPB      ertapenem  IVPB 1000 milliGRAM(s) IV Intermittent every 24 hours  escitalopram 10 milliGRAM(s) Oral <User Schedule>  ferrous    sulfate 325 milliGRAM(s) Oral two times a day  glucagon  Injectable 1 milliGRAM(s) IntraMuscular once  hydrALAZINE 100 milliGRAM(s) Oral three times a day  insulin glargine Injectable (LANTUS) 20 Unit(s) SubCutaneous at bedtime  insulin lispro (ADMELOG) corrective regimen sliding scale   SubCutaneous three times a day before meals  insulin lispro Injectable (ADMELOG) 3 Unit(s) SubCutaneous three times a day before meals  levothyroxine 88 MICROGram(s) Oral <User Schedule>  lisinopril 40 milliGRAM(s) Oral daily  mineral oil enema 133 milliLiter(s) Rectal once  multivitamin/minerals/iron Oral Solution (CENTRUM) 15 milliLiter(s) Oral daily  pantoprazole    Tablet 40 milliGRAM(s) Oral two times a day  polyethylene glycol 3350 17 Gram(s) Oral daily  pyridoxine 50 milliGRAM(s) Oral daily  rosuvastatin 10 milliGRAM(s) Oral at bedtime  senna 2 Tablet(s) Oral at bedtime  tacrolimus 2 milliGRAM(s) Oral daily  tacrolimus 1 milliGRAM(s) Oral at bedtime    MEDICATIONS  (PRN):  acetaminophen   IVPB .. 1000 milliGRAM(s) IV Intermittent every 8 hours PRN Temp greater or equal to 38C (100.4F)  aluminum hydroxide/magnesium hydroxide/simethicone Suspension 30 milliLiter(s) Oral every 4 hours PRN Dyspepsia  dextrose Oral Gel 15 Gram(s) Oral once PRN Blood Glucose LESS THAN 70 milliGRAM(s)/deciliter  hydrALAZINE Injectable 10 milliGRAM(s) IV Push every 8 hours PRN SBP >180 and HR 60-70bpm  melatonin 3 milliGRAM(s) Oral at bedtime PRN Insomnia  metoprolol tartrate Injectable 5 milliGRAM(s) IV Push every 6 hours PRN SBP >170  ondansetron Injectable 4 milliGRAM(s) IV Push every 8 hours PRN Nausea and/or Vomiting    Pertinent Labs: 12-07 Na136 mmol/L Glu 195 mg/dL[H] K+ 4.2 mmol/L Cr  0.79 mg/dL BUN 21 mg/dL 12-03 Phos 2.9 mg/dL 12-02 Alb 2.1 g/dL[L]     CAPILLARY BLOOD GLUCOSE      POCT Blood Glucose.: 173 mg/dL (07 Dec 2024 12:00)  POCT Blood Glucose.: 274 mg/dL (07 Dec 2024 08:03)  POCT Blood Glucose.: 266 mg/dL (06 Dec 2024 22:08)  POCT Blood Glucose.: 233 mg/dL (06 Dec 2024 16:48)      Skin:  sacral I  Edema: none noted    Estimated Needs:   [x] no change since previous assessment on: 12/2 based on #  [ ] recalculated: based on   kcal/day: 2188-5592 liban  gms protein/day: 93-108gms    Previous Nutrition Diagnosis:   [x] Inadequate Oral Intake   [x] Altered Nutrition Related Labs (Glu)    Nutrition Diagnosis is [x] ongoing  [ ] resolved [ ] not applicable     New Nutrition Diagnosis: [x] not applicable       Interventions:   Recommend  [x] Continue current diet  [ ] Change Diet To:  [ ] Nutrition Supplement  [ ] Nutrition Support  [x] Other: assist with meals, encourage intake    Monitoring and Evaluation:   [x] PO intake [ x ] Tolerance to diet prescription [ x ] weights [ x ] labs [ x ] follow up per protocol  [x] other: s/s GI distress, bowel function, skin integrity/ edema

## 2024-12-07 NOTE — PROGRESS NOTE ADULT - SUBJECTIVE AND OBJECTIVE BOX
[facility]    Remi Sampson NP    [address] [place] [address]  [phone]      Chief Complaint:  Patient is a 69y old  Male who presents with a chief complaint of AMS (06 Dec 2024 11:27)      HPI/ 24 hr events:   Patient seen and examined at bedside  Reports feeling well this morning   No acute GI complaints        REVIEW OF SYSTEMS:   General: Negative  HEENT: Negative  CV: Negative  Respiratory: Negative  GI: See HPI  : Negative  MSK: Negative  Hematologic: Negative  Skin: Negative    MEDICATIONS:   MEDICATIONS  (STANDING):  amLODIPine   Tablet 10 milliGRAM(s) Oral daily  ascorbic acid 500 milliGRAM(s) Oral two times a day  aspirin enteric coated 81 milliGRAM(s) Oral daily  azaTHIOprine 50 milliGRAM(s) Oral two times a day  buPROPion XL (24-Hour) . 300 milliGRAM(s) Oral daily  carvedilol 12.5 milliGRAM(s) Oral every 12 hours  cloNIDine 0.1 milliGRAM(s) Oral every 12 hours  dextrose 5%. 1000 milliLiter(s) (100 mL/Hr) IV Continuous <Continuous>  dextrose 5%. 1000 milliLiter(s) (50 mL/Hr) IV Continuous <Continuous>  dextrose 50% Injectable 25 Gram(s) IV Push once  dextrose 50% Injectable 12.5 Gram(s) IV Push once  dextrose 50% Injectable 25 Gram(s) IV Push once  ertapenem  IVPB      ertapenem  IVPB 1000 milliGRAM(s) IV Intermittent every 24 hours  escitalopram 10 milliGRAM(s) Oral <User Schedule>  ferrous    sulfate 325 milliGRAM(s) Oral two times a day  glucagon  Injectable 1 milliGRAM(s) IntraMuscular once  hydrALAZINE 100 milliGRAM(s) Oral three times a day  insulin glargine Injectable (LANTUS) 20 Unit(s) SubCutaneous at bedtime  insulin lispro (ADMELOG) corrective regimen sliding scale   SubCutaneous three times a day before meals  levothyroxine 88 MICROGram(s) Oral <User Schedule>  lisinopril 40 milliGRAM(s) Oral daily  mineral oil enema 133 milliLiter(s) Rectal once  multivitamin/minerals/iron Oral Solution (CENTRUM) 15 milliLiter(s) Oral daily  pamidronate IVPB 60 milliGRAM(s) IV Intermittent once  pantoprazole    Tablet 40 milliGRAM(s) Oral two times a day  polyethylene glycol 3350 17 Gram(s) Oral daily  pyridoxine 50 milliGRAM(s) Oral daily  rosuvastatin 10 milliGRAM(s) Oral at bedtime  senna 2 Tablet(s) Oral at bedtime  tacrolimus 2 milliGRAM(s) Oral daily  tacrolimus 1 milliGRAM(s) Oral at bedtime    MEDICATIONS  (PRN):  acetaminophen   IVPB .. 1000 milliGRAM(s) IV Intermittent every 8 hours PRN Temp greater or equal to 38C (100.4F)  aluminum hydroxide/magnesium hydroxide/simethicone Suspension 30 milliLiter(s) Oral every 4 hours PRN Dyspepsia  dextrose Oral Gel 15 Gram(s) Oral once PRN Blood Glucose LESS THAN 70 milliGRAM(s)/deciliter  hydrALAZINE Injectable 10 milliGRAM(s) IV Push every 8 hours PRN SBP >180 and HR 60-70bpm  melatonin 3 milliGRAM(s) Oral at bedtime PRN Insomnia  metoprolol tartrate Injectable 5 milliGRAM(s) IV Push every 6 hours PRN SBP >170  ondansetron Injectable 4 milliGRAM(s) IV Push every 8 hours PRN Nausea and/or Vomiting      ALLERGIES:   Allergies    No Known Allergies    Intolerances        VITAL SIGNS:   Vital Signs Last 24 Hrs  T(C): 36.8 (06 Dec 2024 10:30), Max: 36.8 (06 Dec 2024 10:30)  T(F): 98.3 (06 Dec 2024 10:30), Max: 98.3 (06 Dec 2024 10:30)  HR: 76 (06 Dec 2024 10:30) (76 - 80)  BP: 150/64 (06 Dec 2024 10:30) (148/62 - 188/70)  BP(mean): --  RR: 18 (06 Dec 2024 04:25) (18 - 18)  SpO2: 94% (06 Dec 2024 04:25) (94% - 96%)    Parameters below as of 06 Dec 2024 04:25  Patient On (Oxygen Delivery Method): room air      I&O's Summary    05 Dec 2024 07:01  -  06 Dec 2024 07:00  --------------------------------------------------------  IN: 0 mL / OUT: 450 mL / NET: -450 mL    06 Dec 2024 07:01  -  06 Dec 2024 12:01  --------------------------------------------------------  IN: 0 mL / OUT: 500 mL / NET: -500 mL        PHYSICAL EXAM:   GENERAL:  No acute distress  HEENT:  NC/AT  CHEST:  No increased effort  HEART:  Regular rate  ABDOMEN:  Soft, non-tender, non-distended, positive bowel sounds  EXTREMITIES: No edema, no cyanosis  SKIN:  Warm, dry  NEURO:  Calm, cooperative    LABS:                        7.8    4.90  )-----------( 339      ( 06 Dec 2024 06:50 )             23.9     12-06    136  |  103  |  19  ----------------------------<  276[H]  4.0   |  30  |  0.77    Ca    11.1[H]      06 Dec 2024 06:50                Hepatitis B Core Antibody, Total: Nonreact (12-04-24 @ 16:06)  Hepatitis B Surface Antibody: Reactive (12-04-24 @ 16:06)  Hepatitis B Surface Antigen: Nonreact (12-04-24 @ 16:06)  Hepatitis C Virus S/CO Ratio: 0.29 S/CO (12-04-24 @ 16:06)                            RADIOLOGY & ADDITIONAL STUDIES:

## 2024-12-07 NOTE — PROGRESS NOTE ADULT - SUBJECTIVE AND OBJECTIVE BOX
Interval History:  remains weak  Chart reviewed and events noted;   Overnight events:    MEDICATIONS  (STANDING):  amLODIPine   Tablet 10 milliGRAM(s) Oral daily  ascorbic acid 500 milliGRAM(s) Oral two times a day  azaTHIOprine 50 milliGRAM(s) Oral two times a day  buPROPion XL (24-Hour) . 300 milliGRAM(s) Oral daily  carvedilol 12.5 milliGRAM(s) Oral every 12 hours  cloNIDine 0.1 milliGRAM(s) Oral every 12 hours  dextrose 5%. 1000 milliLiter(s) (100 mL/Hr) IV Continuous <Continuous>  dextrose 5%. 1000 milliLiter(s) (50 mL/Hr) IV Continuous <Continuous>  dextrose 50% Injectable 25 Gram(s) IV Push once  dextrose 50% Injectable 12.5 Gram(s) IV Push once  dextrose 50% Injectable 25 Gram(s) IV Push once  ertapenem  IVPB 1000 milliGRAM(s) IV Intermittent every 24 hours  ertapenem  IVPB      escitalopram 10 milliGRAM(s) Oral <User Schedule>  ferrous    sulfate 325 milliGRAM(s) Oral two times a day  glucagon  Injectable 1 milliGRAM(s) IntraMuscular once  hydrALAZINE 100 milliGRAM(s) Oral three times a day  insulin glargine Injectable (LANTUS) 20 Unit(s) SubCutaneous at bedtime  insulin lispro (ADMELOG) corrective regimen sliding scale   SubCutaneous three times a day before meals  insulin lispro Injectable (ADMELOG) 3 Unit(s) SubCutaneous three times a day before meals  levothyroxine 88 MICROGram(s) Oral <User Schedule>  lisinopril 40 milliGRAM(s) Oral daily  mineral oil enema 133 milliLiter(s) Rectal once  multivitamin/minerals/iron Oral Solution (CENTRUM) 15 milliLiter(s) Oral daily  pantoprazole    Tablet 40 milliGRAM(s) Oral two times a day  polyethylene glycol 3350 17 Gram(s) Oral daily  pyridoxine 50 milliGRAM(s) Oral daily  rosuvastatin 10 milliGRAM(s) Oral at bedtime  senna 2 Tablet(s) Oral at bedtime  tacrolimus 2 milliGRAM(s) Oral daily  tacrolimus 1 milliGRAM(s) Oral at bedtime    MEDICATIONS  (PRN):  acetaminophen   IVPB .. 1000 milliGRAM(s) IV Intermittent every 8 hours PRN Temp greater or equal to 38C (100.4F)  aluminum hydroxide/magnesium hydroxide/simethicone Suspension 30 milliLiter(s) Oral every 4 hours PRN Dyspepsia  dextrose Oral Gel 15 Gram(s) Oral once PRN Blood Glucose LESS THAN 70 milliGRAM(s)/deciliter  hydrALAZINE Injectable 10 milliGRAM(s) IV Push every 8 hours PRN SBP >180 and HR 60-70bpm  melatonin 3 milliGRAM(s) Oral at bedtime PRN Insomnia  metoprolol tartrate Injectable 5 milliGRAM(s) IV Push every 6 hours PRN SBP >170  ondansetron Injectable 4 milliGRAM(s) IV Push every 8 hours PRN Nausea and/or Vomiting      Vital Signs Last 24 Hrs  T(C): 36.9 (07 Dec 2024 13:31), Max: 36.9 (07 Dec 2024 13:31)  T(F): 98.4 (07 Dec 2024 13:31), Max: 98.4 (07 Dec 2024 13:31)  HR: 66 (07 Dec 2024 13:31) (66 - 72)  BP: 153/66 (07 Dec 2024 13:31) (136/67 - 182/70)  BP(mean): --  RR: 16 (07 Dec 2024 13:31) (16 - 20)  SpO2: 97% (07 Dec 2024 13:31) (95% - 97%)    Parameters below as of 07 Dec 2024 13:31  Patient On (Oxygen Delivery Method): room air        PHYSICAL EXAM  General: adult in NAD, chronically ill appearing.   HEENT: clear oropharynx, anicteric sclera, pink conjunctivae  Neck: supple  CV: normal S1S2 with no murmur rubs or gallops  Lungs: clear to auscultation, no wheezes, no rhales  Abdomen: soft non-tender non-distended, no hepato/splenomegaly  Ext: no clubbing cyanosis or edema  Skin: no rashes and no petichiae  Neuro: lethargic      LABS:  CBC Full  -  ( 07 Dec 2024 12:14 )  WBC Count : 4.46 K/uL  RBC Count : 2.57 M/uL  Hemoglobin : 7.5 g/dL  Hematocrit : 23.6 %  Platelet Count - Automated : 365 K/uL  Mean Cell Volume : 91.8 fl  Mean Cell Hemoglobin : 29.2 pg  Mean Cell Hemoglobin Concentration : 31.8 g/dL  Auto Neutrophil # : x  Auto Lymphocyte # : x  Auto Monocyte # : x  Auto Eosinophil # : x  Auto Basophil # : x  Auto Neutrophil % : x  Auto Lymphocyte % : x  Auto Monocyte % : x  Auto Eosinophil % : x  Auto Basophil % : x    12-07    136  |  101  |  21  ----------------------------<  195[H]  4.2   |  31  |  0.79    Ca    11.0[H]      07 Dec 2024 12:14          fe studies  Iron - Total Binding Capacity.: 182 ug/dL (12-05 @ 10:00)  Ferritin: 973 ng/mL (12-05 @ 10:00)      WBC trend  4.46 K/uL (12-07-24 @ 12:14)  4.90 K/uL (12-06-24 @ 06:50)  5.83 K/uL (12-05-24 @ 10:00)      Hgb trend  7.5 g/dL (12-07-24 @ 12:14)  7.8 g/dL (12-06-24 @ 06:50)  7.8 g/dL (12-05-24 @ 10:00)      plt trend  365 K/uL (12-07-24 @ 12:14)  339 K/uL (12-06-24 @ 06:50)  298 K/uL (12-05-24 @ 10:00)        RADIOLOGY & ADDITIONAL STUDIES:  < from: MR Abdomen No Cont (12.02.24 @ 09:47) >  ACC: [id number] EXAM:  MR ABDOMEN   ORDERED BY: TIERA CHILEL     *** ADDENDUM # 1 ***    Upon rereview of the MRI, the spleen has increased in size since   9/19/2023 and there appears to be an isointense mass at the superior   aspect of the spleen measuring 8.8; also see right upper quadrant   ultrasound report 12/3/2024; a complete pre and post IV contrast MRI of   the abdomen is recommended for further evaluation of the liver and spleen   lesions.    The findings were discussed with Dr. Upton on 12/4/2024 at 9:50 AM    --- End of Report ---    *** END OF ADDENDUM # 1 ***      PROCEDURE DATE:  12/02/2024          INTERPRETATION:  CLINICAL INFORMATION: Altered mental status. Sepsis.   Sacral infection. Hepatic lesion.    COMPARISON: Multiple prior studies including CT chest/abdomen/pelvis   11/29/2024, CT chest/abdomen/pelvis 9/19/2023, and CT abdomen/pelvis   2/2/2020    CONTRAST/COMPLICATIONS:  IV Contrast: NONE  Oral Contrast: NONE  .    PROCEDURE:  MRI of the abdomen was performed.  The patient declined to complete the examination.    FINDINGS:  LOWER CHEST: Small bilateral pleural effusions. Left lower lobe opacity,   possibly rounded atelectasis.    LIVER: 5.4 x 4.3 cm high T2 signal lesion in the right hepatic lobe   (series 2 image 18), incompletely characterized on this study, new since   9/19/2023.  BILE DUCTS: Mildly dilated upstream extrahepatic related to which tapers   to normal caliber distally it can't be compatible with the   postcholecystectomy state.  GALLBLADDER: Cholecystectomy.  SPLEEN: Enlarged measuring 15.8 cm in length.  PANCREAS: Within normal limits.  ADRENALS: Within normal limits.  KIDNEYS/URETERS: Left nephrectomy. Atrophic negative right kidney with   mild hydronephrosis. Right lower quadrant renal transplant.    VISUALIZED PORTIONS:  BOWEL: Moderate amount retained fecal material in the colon.  PERITONEUM: Small amount of ascites.  VESSELS: Abdominal aorta normal in caliber. 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  RETROPERITONEUM/LYMPH NODES: See above.  ABDOMINAL WALL: Unremarkable.  BONES: Partially imaged bilateral sacral fractures.    IMPRESSION:    Incomplete study; patient declined to complete the MRI.    5.4 cm lesion in the right hepatic lobe, incompletely characterized on   this study, new since 9/19/2023, suspicious for malignancy; differential   includes metastatic disease or a primary hepatic neoplasm; differential   also includes infection/abscess which is considered less likely; further   evaluation is recommended with ultrasound.    The findings were discussed with Dr. Pittman at 1:10 PM on 12/3/2024.    --- End of Report ---    ***Please see the addendum at the top of this report. It may contain   additional important information or changes.****        SCOTT SAEED MD; Attending Radiologist  This document has been electronically signed. Dec  3 2024  1:22PM  1st Addendum: SCOTT SAEED MD; Attending Radiologist  The first addendum was electronically signed on: Dec  4 2024  9:54AM.    < end of copied text >

## 2024-12-07 NOTE — PROGRESS NOTE ADULT - ASSESSMENT
Patient is a 70yo M, PMH DM, HTN, HLD, h/o kidney transplant, hypothyroidism, presents to ED from [facility] for AMS likely  acute  metabolic encephalopathy       AMS 2/2 Sepsis 2/2 multiple infections (UTI, sacral infection, possible osteomyelitis), E coli bacteremia  cause of acute  metabolic encephalopathy   Sepsis 2/2 ESBL Ecoli UTI and bacteremia. sensitive to ertapenem.  - C/W  Meropenum 1gm q24h x10 day course until 12/10 d/w  ID Dr. Saul with  midline   -Tylenol PRN pain and fever- diet as tolerated   - aspiration and fall risk - Continue Senna, Miralax, PRN dulcolax suppositories   - MR pelvis/sacrum to eval for osteomyelitis- last    Again seen are comminuted bilateral sacral lona fractures with marked   osseous edema and marked loss of normal T1 fatty marrow. Osseous edema   with loss of normal T1 fatty marrow at the caudal aspect of the left   posterior iliac bone adjacent to the sacroiliac joint. Previously seen   fracture component is better visualized on CT. These findings could be   secondary to extensive fracture deformities in an osteopenic patient   versus osteomyelitis if there was a history of a soft tissue ulcer   extending to bone versus metastatic disease given the marked loss of T1   fatty marrow if there is a history of malignancy. Clinical correlation   with patient history is advised.  - Per Ortho no surgical intervention for fracture of sacrum  / nees dolores     Acute on Chronic anemia :  - Likely due to infection,  and CKD- No signs of bleeding noted    - Iron22/ sat low    iron deficiency with chr anemia  ,  occult blood  - no bm   -- D/w Hem Dr. Sal  With prior renal transplant would use irradiated PRBC and try to keep transfusions to a minimum.     s/p   1 unit prbc - hb stable   Hepatic lesion  CT abd with 5.5cm lesion on R hepatic lobe  wife known mass it benin last biopsy -   but hem/onc   DR SAL would like to  repeat   liver biopsy by ir next wk  -    as histopath not full reported by  [facility].     Diabetes Mellitus / hyperglycemia  dm DietPre meal Insulin  3 unit tid, Lantus to 20 u QHS    fingerstick glucose closely follow up.   ISS A1c 7.4 -     HTN: Uncontrolled   Continue Lisinopril 20mg  increase to 40 mg  daily with hold parameters  Continue Hydralazine 100mg TID with hold parameters  Continue Coreg, switched from Metoprolol - 12.5 mg po bid  Continue Amlodipine 10mg daily - fu bp closely   add on clonidine 0.1 bid   HLD  Continue Crestor 10mg daily    s/p Kidney transplant/CKD 3  Low mag and PO4, replaced  Continue Tacrolimus 2mg daily  Continue Tacrolimus 1mg QHS  Continue Azathioprine 50mg BID  Nephrology consulted dr vizcarra     Hypothyroidism  Continue Levothyroxine 88mcg QAM     hypercalcemia was possible  sec to  underlying  lymphoproliferative dis  sec to vit D  s/p  calcitonin   on  Aredia - fu ca closely.  Depression  Continue Escitalopram 10mg TID  Continue Bupropion 300mg daily    liver mass and spleen mass as per radiologist as per wife old diagnostic     DVT ppx: Hold AC due to anemia and risk of bleeding       wife  aware tt/plan        Dispo: JOCELYNN bernal   Patient is a 68yo M, PMH DM, HTN, HLD, h/o kidney transplant, hypothyroidism, presents to ED from [facility] for AMS likely  acute  metabolic encephalopathy       AMS 2/2 Sepsis  poa  2/2 multiple infections (UTI, sacral infection, possible osteomyelitis), E coli bacteremia  cause of acute  metabolic encephalopathy   Sepsis 2/2 ESBL Ecoli UTI and bacteremia. sensitive to ertapenem.  - C/W  Meropenum 1gm q24h x10 day course until 12/10 d/w  ID Dr. Saul with  midline   -Tylenol PRN pain and fever- diet as tolerated   - aspiration and fall risk - Continue Senna, Miralax, PRN dulcolax suppositories   - MR pelvis/sacrum to eval for osteomyelitis- last    Again seen are comminuted bilateral sacral lona fractures with marked   osseous edema and marked loss of normal T1 fatty marrow. Osseous edema   with loss of normal T1 fatty marrow at the caudal aspect of the left   posterior iliac bone adjacent to the sacroiliac joint. Previously seen   fracture component is better visualized on CT. These findings could be   secondary to extensive fracture deformities in an osteopenic patient   versus osteomyelitis if there was a history of a soft tissue ulcer   extending to bone versus metastatic disease given the marked loss of T1   fatty marrow if there is a history of malignancy. Clinical correlation   with patient history is advised.  - Per Ortho no surgical intervention for fracture of sacrum  / nees dolores     Acute on Chronic anemia :  - Likely due to infection,  and CKD- No signs of bleeding noted    - Iron22/ sat low    iron deficiency with chr anemia  ,  occult blood  - no bm   -- D/w Hem Dr. Sal  With prior renal transplant would use irradiated PRBC and try to keep transfusions to a minimum.     s/p   1 unit prbc - hb stable   Hepatic lesion  CT abd with 5.5cm lesion on R hepatic lobe  wife known mass it benin last biopsy -   but hem/onc   DR SAL would like to  repeat   liver biopsy by ir next wk  -    as histopath not full reported by  [facility].     Diabetes Mellitus / hyperglycemia  dm DietPre meal Insulin  3 unit tid, Lantus to 20 u QHS    fingerstick glucose closely follow up.   ISS A1c 7.4 -     HTN: Uncontrolled   Continue Lisinopril 20mg  increase to 40 mg  daily with hold parameters  Continue Hydralazine 100mg TID with hold parameters  Continue Coreg, switched from Metoprolol - 12.5 mg po bid  Continue Amlodipine 10mg daily - fu bp closely   add on clonidine 0.1 bid   HLD  Continue Crestor 10mg daily    s/p Kidney transplant/CKD 3  Low mag and PO4, replaced  Continue Tacrolimus 2mg daily  Continue Tacrolimus 1mg QHS  Continue Azathioprine 50mg BID  Nephrology consulted dr vizcarra     Hypothyroidism  Continue Levothyroxine 88mcg QAM     hypercalcemia was possible  sec to  underlying  lymphoproliferative dis  sec to vit D  s/p  calcitonin   on  Aredia - fu ca closely.  Depression  Continue Escitalopram 10mg TID  Continue Bupropion 300mg daily    liver mass and spleen mass as per radiologist as per wife old diagnostic     DVT ppx: Hold AC due to anemia and risk of bleeding       wife  aware tt/plan        Dispo: JOCELYNN bernal

## 2024-12-07 NOTE — PROGRESS NOTE ADULT - ASSESSMENT
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 component of AIH        I reviewed the overnight course of events on the unit, re-confirming the patient history. I discussed the care with the patient.  Differential diagnosis and plan of care discussed with patient after the evaluation.  35 minutes spent on total encounter of which more than fifty percent of the encounter was spent counseling and/or coordinating care by the attending physician.

## 2024-12-07 NOTE — PROGRESS NOTE ADULT - SUBJECTIVE AND OBJECTIVE BOX
[facility] -- Tom Rahman Pannella, Patel, Savella, Goodger, Cohen  Office # [phone]    Follow Up:   Uncontrolled HTN    Subjective/Observations: seen and examined, awake, alert in bed, unable to provide meaningful information at this time. Tolerating room air. no events noted overnight     REVIEW OF SYSTEMS: All other review of systems is negative unless indicated above  PAST MEDICAL & SURGICAL HISTORY:  Diabetes Mellitus Type II  Insulin pump (2010)      GERD (gastroesophageal reflux disease)      Depression      Hypothyroidism      Hyperlipidemia      Kidney transplanted  2012      Hypertension      ANGELIKA (obstructive sleep apnea)      Peripheral neuropathy      Shoulder fracture  Left.      History of colonoscopy      S/P kidney transplant  2012      S/P cholecystectomy      Retroperitoneal fibrosis  s/p surgery      AV fistula  Right arm      S/P right cataract extraction      S/P left cataract extraction      H/O unilateral nephrectomy  Left        MEDICATIONS  (STANDING):  amLODIPine   Tablet 10 milliGRAM(s) Oral daily  ascorbic acid 500 milliGRAM(s) Oral two times a day  azaTHIOprine 50 milliGRAM(s) Oral two times a day  buPROPion XL (24-Hour) . 300 milliGRAM(s) Oral daily  carvedilol 12.5 milliGRAM(s) Oral every 12 hours  cloNIDine 0.1 milliGRAM(s) Oral every 12 hours  dextrose 5%. 1000 milliLiter(s) (100 mL/Hr) IV Continuous <Continuous>  dextrose 5%. 1000 milliLiter(s) (50 mL/Hr) IV Continuous <Continuous>  dextrose 50% Injectable 25 Gram(s) IV Push once  dextrose 50% Injectable 12.5 Gram(s) IV Push once  dextrose 50% Injectable 25 Gram(s) IV Push once  ertapenem  IVPB      ertapenem  IVPB 1000 milliGRAM(s) IV Intermittent every 24 hours  escitalopram 10 milliGRAM(s) Oral <User Schedule>  ferrous    sulfate 325 milliGRAM(s) Oral two times a day  glucagon  Injectable 1 milliGRAM(s) IntraMuscular once  hydrALAZINE 100 milliGRAM(s) Oral three times a day  insulin glargine Injectable (LANTUS) 20 Unit(s) SubCutaneous at bedtime  insulin lispro (ADMELOG) corrective regimen sliding scale   SubCutaneous three times a day before meals  insulin lispro Injectable (ADMELOG) 3 Unit(s) SubCutaneous three times a day before meals  levothyroxine 88 MICROGram(s) Oral <User Schedule>  lisinopril 40 milliGRAM(s) Oral daily  mineral oil enema 133 milliLiter(s) Rectal once  multivitamin/minerals/iron Oral Solution (CENTRUM) 15 milliLiter(s) Oral daily  pantoprazole    Tablet 40 milliGRAM(s) Oral two times a day  polyethylene glycol 3350 17 Gram(s) Oral daily  pyridoxine 50 milliGRAM(s) Oral daily  rosuvastatin 10 milliGRAM(s) Oral at bedtime  senna 2 Tablet(s) Oral at bedtime  tacrolimus 2 milliGRAM(s) Oral daily  tacrolimus 1 milliGRAM(s) Oral at bedtime    MEDICATIONS  (PRN):  acetaminophen   IVPB .. 1000 milliGRAM(s) IV Intermittent every 8 hours PRN Temp greater or equal to 38C (100.4F)  aluminum hydroxide/magnesium hydroxide/simethicone Suspension 30 milliLiter(s) Oral every 4 hours PRN Dyspepsia  dextrose Oral Gel 15 Gram(s) Oral once PRN Blood Glucose LESS THAN 70 milliGRAM(s)/deciliter  hydrALAZINE Injectable 10 milliGRAM(s) IV Push every 8 hours PRN SBP >180 and HR 60-70bpm  melatonin 3 milliGRAM(s) Oral at bedtime PRN Insomnia  metoprolol tartrate Injectable 5 milliGRAM(s) IV Push every 6 hours PRN SBP >170  ondansetron Injectable 4 milliGRAM(s) IV Push every 8 hours PRN Nausea and/or Vomiting    Allergies    No Known Allergies    Intolerances      Vital Signs Last 24 Hrs  T(C): 36.8 (07 Dec 2024 05:03), Max: 36.8 (06 Dec 2024 10:30)  T(F): 98.2 (07 Dec 2024 05:03), Max: 98.3 (06 Dec 2024 10:30)  HR: 70 (07 Dec 2024 06:11) (69 - 76)  BP: 146/75 (07 Dec 2024 06:11) (136/67 - 152/62)  BP(mean): --  RR: 20 (07 Dec 2024 05:03) (18 - 20)  SpO2: 95% (07 Dec 2024 05:03) (95% - 97%)    Parameters below as of 07 Dec 2024 05:03  Patient On (Oxygen Delivery Method): room air      I&O's Summary    06 Dec 2024 07:01  -  07 Dec 2024 07:00  --------------------------------------------------------  IN: 50 mL / OUT: 1900 mL / NET: -1850 mL          TELE: Not on telemetry   PHYSICAL EXAM:  Constitutional: NAD, awake and alert  HEENT: Moist Mucous Membranes, Anicteric  Pulmonary: Non-labored, breath sounds are clear bilaterally, No wheezing, rales or rhonchi  Cardiovascular: Regular, S1 and S2, No murmurs, rubs, gallops or clicks  Gastrointestinal: Bowel Sounds present, soft, nontender.   Lymph: No peripheral edema. No lymphadenopathy.  Skin: No visible rashes or ulcers.  Psych:  Mood & affect flat, pleasantly confused  LABS: All Labs Reviewed:                        7.8    4.90  )-----------( 339      ( 06 Dec 2024 06:50 )             23.9                         7.8    5.83  )-----------( 298      ( 05 Dec 2024 10:00 )             23.7                         6.6    x     )-----------( x        ( 04 Dec 2024 16:06 )             20.7     06 Dec 2024 06:50    136    |  103    |  19     ----------------------------<  276    4.0     |  30     |  0.77   05 Dec 2024 10:00    137    |  103    |  16     ----------------------------<  144    3.8     |  25     |  0.84     Ca    11.1       06 Dec 2024 06:50  Ca    10.6       05 Dec 2024 10:00    TPro  6.6    /  Alb  x      /  TBili  x      /  DBili  x      /  AST  x      /  ALT  x      /  AlkPhos  x      04 Dec 2024 16:06          12 Lead ECG:   Ventricular Rate 81 BPM    Atrial Rate 81 BPM    P-R Interval 148 ms    QRS Duration 104 ms    Q-T Interval 406 ms    QTC Calculation(Bazett) 471 ms    P Axis 44 degrees    R Axis 12 degrees    T Axis 53 degrees    Diagnosis Line Normal sinus rhythm  Normal ECG  When compared with ECG of 29-NOV-2024 07:11,  Nonspecific T wave abnormality, improved in Lateral leads  Confirmed by Kya Gonzalez ([id number]) on 12/1/2024 10:43:18 AM (12-01-24 @ 09:28)      TRANSTHORACIC ECHOCARDIOGRAM REPORT  ________________________________________________________________________________                                      _______       Pt. Name:       JAN CALVIN Study Date:    11/29/2024  MRN:            [id number]          YOB: 1955  Accession #:    [id number]         Age:           69 years  Account#:       [id number]        Gender:        M  Heart Rate:                       Height:        64.17 in (163.00 cm)  Rhythm:       Weight:        169.75 lb (77.00 kg)  Blood Pressure: 196/81 mmHg       BSA/BMI:       1.83 m² / 28.98 kg/m²  ________________________________________________________________________________________  Referring Physician:    [id number] Ale Ibrahim  Interpreting Physician: Kya Gonzalez MD  Primary Sonographer:    Butch Salazar    CPT:               ECHO TTE WO CON COMP W DOPP - 66032.m  Indication(s):     Cardiac murmur, unspecified - R01.1  Procedure:         Transthoracic echocardiogram with 2-D, M-mode and complete                     spectral and color flow Doppler.  Ordering Location: [facility]  Admission Status:  ED    _______________________________________________________________________________________     CONCLUSIONS:      1. Left ventricular cavity is normal in size. Left ventricular systolic function is normal with an ejection fraction of 60 % by Moreno's method of disks.   2. Trace pericardial effusion noted adjacent to the posterior left ventricle.   3. Normal right ventricular cavity size and normal right ventricular systolic function.   4. Aortic valve anatomy cannot be determined with normal systolic excursion. There is calcification of the aortic valve leaflets.   5. Structurally normal mitral valve with normalleaflet excursion.   6. Structurally normal tricuspid valve with normal leaflet excursion. Trace tricuspid regurgitation.   7. The inferior vena cava is normal in size measuring 1.36 cm in diameter, (normal <2.1cm) with normal inspiratory collapse (normal >50%) consistent with normal right atrial pressure (~3, range 0-5mmHg).    ________________________________________________________________________________________  FINDINGS:     Left Ventricle:  The left ventricular cavity is normal in size. Left ventricular systolic function is normal with a calculated ejection fraction of 60 % by the Moreno's biplane method of disks.     Right Ventricle:  The right ventricular cavity is normal in size and right ventricular systolic function is normal. Tricuspid annular plane systolic excursion (TAPSE) is 2.9 cm (normal >=1.7 cm).     Left Atrium:  The left atrium is normal in size with an indexed volume of 33.15 ml/m².     Right Atrium:  The right atrium is normal in size with an indexed volume of 21.17 ml/m².     Interatrial Septum:  The interatrial septum appears intact.     Aortic Valve:  The aortic valve anatomy cannot be determined with normal systolic excursion. There is calcification of the aortic valve leaflets. The peak transaortic velocity is 2.15 m/s, peak transaortic gradient is 18.5 mmHg and mean transaortic gradient is 11.0 mmHg with an LVOT/aortic valve VTI ratio of 0.64. The aortic valve area is estimated at 2.67 cm² by the continuity equation. There is no evidence of aortic regurgitation.     Mitral Valve:  Structurally normal mitral valve with normal leaflet excursion. There is calcification of the mitral valve annulus.     Tricuspid Valve:  Structurally normal tricuspid valve with normal leaflet excursion. There is trace tricuspid regurgitation. Estimated pulmonary artery systolic pressure is 8 mmHg.     Aorta:  The aortic root at the sinuses of Valsalva is normal in size, measuring 3.50 cm (indexed 1.91 cm/m²). The ascending aorta is dilated, measuring 4.10 cm (indexed 2.24 cm/m²).     Pericardium:  There is a trace pericardial effusion noted adjacent to the posterior left ventricle.     Systemic Veins:  The inferior vena cava is normal in size measuring 1.36 cm in diameter, (normal <2.1cm) with normal inspiratory collapse (normal >50%) consistent with normal right atrial pressure (~3, range 0-5mmHg).  ____________________________________________________________________  QUANTITATIVE DATA:  Left Ventricle Measurements: (Indexed to BSA)     IVSd (2D):   1.0 cm  LVPWd (2D):  1.0 cm  LVIDd (2D):  5.3 cm  LVIDs (2D):  3.6 cm  LV Mass:     203 g  111.2 g/m²  LV Vol d, MOD A2C: 97.8 ml  53.50 ml/m²  LV Vol d, MOD A4C: 121.0 ml 66.19 ml/m²  LV Vol d, MOD BP:  109.5 ml 59.90 ml/m²  LV Vol s, MOD A2C: 36.4 ml  19.91 ml/m²  LV Vol s, MOD A4C: 49.5 ml  27.08 ml/m²  LV Vol s, MOD BP:  44.0 ml  24.04 ml/m²  LVOT SV MOD BP:    65.5 ml  LV EF% MOD BP:     60 %     MV E Vmax:    1.02 m/s  MV A Vmax:    0.93 m/s  MV E/A:       1.10  e' lateral:   13.10 cm/s  e' medial:    9.25 cm/s  E/e' lateral: 7.79  E/e' medial:  11.03  E/e' Average: 9.13  MV DT:        151 msec    Aorta Measurements: (Normal range) (Indexed to BSA)     Ao Root d     3.50 cm (3.1 - 3.7 cm) 1.91 cm/m²  Ao Asc d, 2D: 4.10  Ao Asc prox:  4.10 cm               2.24 cm/m²            Left Atrium Measurements: (Indexed to BSA)  LA Diam 2D: 4.40 cm         Right Ventricle Measurements: Right Atrial Measurements:     TAPSE:            2.9 cm      RA Vol:            38.70 ml  RV Base (RVID1):  3.7cm      RA Vol s, MOD A4C  38.7 ml  RV Mid (RVID2):   3.1 cm      RA Vol Index:      21.17 ml/m²  RV Major (RVID3): 8.0 cm      RA Area s, MOD A4C 14.7 cm²       LVOT / RVOT/ Qp/Qs Data: (Indexed to BSA)  LVOT Diameter:  2.30 cm  LVOT Area:      4.15cm²  LVOT Vmax:      1.34 m/s  LVOT Vmn:       0.949 m/s  LVOT VTI:       26.30 cm  LVOT peak grad: 7 mmHg  LVOT mean grad: 4.0 mmHg  LVOT SV:        109.3 ml  59.78 ml/m²    Aortic Valve Measurements:  AV Vmax:                2.2 m/s  AV Peak Gradient:       18.5 mmHg  AV Mean Gradient:       11.0 mmHg  AV VTI:                 41.0 cm  AV VTI Ratio:           0.64  AoV EOA, Contin:        2.67 cm²  AoV EOA, Contin i:      1.46 cm²/m²  AoV Dimensionless Index 0.64    Mitral Valve Measurements:     MV E Vmax: 1.0 m/s  MV A Vmax: 0.9 m/s  MV E/A:    1.1       Tricuspid Valve Measurements:     TR Vmax:          1.2 m/s  TR Peak Gradient: 5.3 mmHg  RA Pressure:      3 mmHg  PASP:             8 mmHg    ________________________________________________________________________________________  Electronically signed on 11/30/2024 at 1:57:15 PM by Kya Gonzalez MD         *** Final ***

## 2024-12-07 NOTE — PROGRESS NOTE ADULT - ASSESSMENT
68yo M, PMH DM, HTN, HLD, h/o kidney transplant, hypothyroidism, presents to ED from [facility] for AMS, found to be febrile with positive UA. 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 Cardiology called for pericardial effusion.     Uncontrolled HTN/Pericardial Effusion  - CT chest: Small left pleural effusion with small loculated components, Small to moderate pericardial effusion  - TTE: EF 60%, trace pericardial effusion adjacent ot the posterior LV.  No significant valvular disease  - no signs of tamponade on examination    - No evidence of any meaningful volume overload   - Okay with IV fluids.  He appears to be dry on exam. Encourage fluid/po intake     - EKG with nonspecific TWI anteriorly, repeat unchanged  - No evidence of any active ischemia   - Continue statin   - now off ASA in setting of persistent anemia (neg FOBT), with Hgb 6-7    - BP improved 130-140's systolics   - continue current  antihypertensive agents on: Norvasc, Coreg, and Hydralazine, Lisinopril and clonidine   - Monitor and replete Lytes. Keep K > 4 and Mg > 2    - Heme and GI following for anemia  - Ortho following for pathological sacral Fx with possible OM.   - No acute orthopedic surgical intervention at this time.  - Abx per ID     - Will continue to follow.    RUSSELL Farias  Nurse Practitioner - Cardiology   call TEAMS

## 2024-12-07 NOTE — PROGRESS NOTE ADULT - SUBJECTIVE AND OBJECTIVE BOX
Patient is a 69y old  Male who presents with a chief complaint of AMS (07 Dec 2024 09:54)    pt seen and examine awake , tolerating po but low intack  , no fever  , no distress .  INTERVAL HPI/OVERNIGHT EVENTS:     T(C): 36.4 (12-07-24 @ 10:31), Max: 36.8 (12-06-24 @ 17:00)  HR: 72 (12-07-24 @ 10:31) (69 - 72)  BP: 162/63 (12-07-24 @ 10:54) (136/67 - 182/70)  RR: 16 (12-07-24 @ 10:31) (16 - 20)  SpO2: 95% (12-07-24 @ 10:31) (95% - 97%)  Wt(kg): --  I&O's Summary    06 Dec 2024 07:01  -  07 Dec 2024 07:00  --------------------------------------------------------  IN: 50 mL / OUT: 1900 mL / NET: -1850 mL        REVIEW OF SYSTEMS:  CONSTITUTIONAL: No fever, weight loss, + fatigue  EYES: No eye pain, visual disturbances, or discharge  ENMT:  No difficulty hearing, tinnitus, vertigo; No sinus or throat pain  NECK: No pain or stiffness  RESPIRATORY: No cough, wheezing, chills ; No shortness of breath  CARDIOVASCULAR: No chest pain, palpitations, dizziness, or leg swelling  GASTROINTESTINAL: No abdominal or epigastric pain. No nausea, vomiting,  No diarrhea or constipation. No melena or hematochezia.  GENITOURINARY: No dysuria, frequency, hematuria, or incontinence  NEUROLOGICAL: No headaches, memory loss, loss of strength, numbness, or tremors  SKIN: No itching, burning, rashes, or lesions   MUSCULOSKELETAL: No joint pain or swelling; No muscle, back, or extremity pain    PHYSICAL EXAM:  GENERAL: NAD,  weak   HEAD:  Atraumatic, Normocephalic  EYES: EOMI, PERRLA, conjunctiva and sclera clear  ENMT t; Moist mucous membranes  NECK: Supple, No JVD  NERVOUS SYSTEM:  Alert & Oriented X3; Motor Strength 5/5 B/L upper and lower extremities; DTRs 2+ intact and symmetric  CHEST/LUNG:   percussion bilaterally; No rales, rhonchi, wheezing,   HEART: Regular rate and rhythm; No murmurs,   ABDOMEN: Soft, Nontender, Nondistended; Bowel sounds present  EXTREMITIES:  2+ Peripheral Pulses, No clubbing, cyanosis, or edema  SKIN: No rashes or lesions / picc line intact         MEDICATIONS  (STANDING):  amLODIPine   Tablet 10 milliGRAM(s) Oral daily  ascorbic acid 500 milliGRAM(s) Oral two times a day  azaTHIOprine 50 milliGRAM(s) Oral two times a day  buPROPion XL (24-Hour) . 300 milliGRAM(s) Oral daily  carvedilol 12.5 milliGRAM(s) Oral every 12 hours  cloNIDine 0.1 milliGRAM(s) Oral every 12 hours  dextrose 5%. 1000 milliLiter(s) (100 mL/Hr) IV Continuous <Continuous>  dextrose 5%. 1000 milliLiter(s) (50 mL/Hr) IV Continuous <Continuous>  dextrose 50% Injectable 25 Gram(s) IV Push once  dextrose 50% Injectable 12.5 Gram(s) IV Push once  dextrose 50% Injectable 25 Gram(s) IV Push once  ertapenem  IVPB      ertapenem  IVPB 1000 milliGRAM(s) IV Intermittent every 24 hours  escitalopram 10 milliGRAM(s) Oral <User Schedule>  ferrous    sulfate 325 milliGRAM(s) Oral two times a day  glucagon  Injectable 1 milliGRAM(s) IntraMuscular once  hydrALAZINE 100 milliGRAM(s) Oral three times a day  insulin glargine Injectable (LANTUS) 20 Unit(s) SubCutaneous at bedtime  insulin lispro (ADMELOG) corrective regimen sliding scale   SubCutaneous three times a day before meals  insulin lispro Injectable (ADMELOG) 3 Unit(s) SubCutaneous three times a day before meals  levothyroxine 88 MICROGram(s) Oral <User Schedule>  lisinopril 40 milliGRAM(s) Oral daily  mineral oil enema 133 milliLiter(s) Rectal once  multivitamin/minerals/iron Oral Solution (CENTRUM) 15 milliLiter(s) Oral daily  pantoprazole    Tablet 40 milliGRAM(s) Oral two times a day  polyethylene glycol 3350 17 Gram(s) Oral daily  pyridoxine 50 milliGRAM(s) Oral daily  rosuvastatin 10 milliGRAM(s) Oral at bedtime  senna 2 Tablet(s) Oral at bedtime  tacrolimus 2 milliGRAM(s) Oral daily  tacrolimus 1 milliGRAM(s) Oral at bedtime    MEDICATIONS  (PRN):  acetaminophen   IVPB .. 1000 milliGRAM(s) IV Intermittent every 8 hours PRN Temp greater or equal to 38C (100.4F)  aluminum hydroxide/magnesium hydroxide/simethicone Suspension 30 milliLiter(s) Oral every 4 hours PRN Dyspepsia  dextrose Oral Gel 15 Gram(s) Oral once PRN Blood Glucose LESS THAN 70 milliGRAM(s)/deciliter  hydrALAZINE Injectable 10 milliGRAM(s) IV Push every 8 hours PRN SBP >180 and HR 60-70bpm  melatonin 3 milliGRAM(s) Oral at bedtime PRN Insomnia  metoprolol tartrate Injectable 5 milliGRAM(s) IV Push every 6 hours PRN SBP >170  ondansetron Injectable 4 milliGRAM(s) IV Push every 8 hours PRN Nausea and/or Vomiting      LABS:                        7.8    4.90  )-----------( 339      ( 06 Dec 2024 06:50 )             23.9     12-06    136  |  103  |  19  ----------------------------<  276[H]  4.0   |  30  |  0.77    Ca    11.1[H]      06 Dec 2024 06:50        Urinalysis Basic - ( 06 Dec 2024 06:50 )    Color: x / Appearance: x / SG: x / pH: x  Gluc: 276 mg/dL / Ketone: x  / Bili: x / Urobili: x   Blood: x / Protein: x / Nitrite: x   Leuk Esterase: x / RBC: x / WBC x   Sq Epi: x / Non Sq Epi: x / Bacteria: x      CAPILLARY BLOOD GLUCOSE      POCT Blood Glucose.: 173 mg/dL (07 Dec 2024 12:00)  POCT Blood Glucose.: 274 mg/dL (07 Dec 2024 08:03)  POCT Blood Glucose.: 266 mg/dL (06 Dec 2024 22:08)  POCT Blood Glucose.: 233 mg/dL (06 Dec 2024 16:48)              RADIOLOGY & ADDITIONAL TESTS:    Imaging Personally Reviewed:     no new test   Advance Directives:  full code    Palliative Care:  Appropriate

## 2024-12-08 LAB
ANION GAP SERPL CALC-SCNC: 5 MMOL/L — SIGNIFICANT CHANGE UP (ref 5–17)
BASOPHILS # BLD AUTO: 0.01 K/UL — SIGNIFICANT CHANGE UP (ref 0–0.2)
BASOPHILS NFR BLD AUTO: 0.2 % — SIGNIFICANT CHANGE UP (ref 0–2)
BUN SERPL-MCNC: 19 MG/DL — SIGNIFICANT CHANGE UP (ref 7–23)
CALCIUM SERPL-MCNC: 10.6 MG/DL — HIGH (ref 8.5–10.1)
CHLORIDE SERPL-SCNC: 100 MMOL/L — SIGNIFICANT CHANGE UP (ref 96–108)
CO2 SERPL-SCNC: 27 MMOL/L — SIGNIFICANT CHANGE UP (ref 22–31)
CREAT SERPL-MCNC: 0.76 MG/DL — SIGNIFICANT CHANGE UP (ref 0.5–1.3)
CREATININE, URINE RESULT: 66 MG/DL — SIGNIFICANT CHANGE UP
EGFR: 99 ML/MIN/1.73M2 — SIGNIFICANT CHANGE UP
EOSINOPHIL # BLD AUTO: 0.02 K/UL — SIGNIFICANT CHANGE UP (ref 0–0.5)
EOSINOPHIL NFR BLD AUTO: 0.4 % — SIGNIFICANT CHANGE UP (ref 0–6)
GLUCOSE BLDC GLUCOMTR-MCNC: 249 MG/DL — HIGH (ref 70–99)
GLUCOSE BLDC GLUCOMTR-MCNC: 262 MG/DL — HIGH (ref 70–99)
GLUCOSE BLDC GLUCOMTR-MCNC: 280 MG/DL — HIGH (ref 70–99)
GLUCOSE BLDC GLUCOMTR-MCNC: 326 MG/DL — HIGH (ref 70–99)
GLUCOSE SERPL-MCNC: 263 MG/DL — HIGH (ref 70–99)
HCT VFR BLD CALC: 21.8 % — LOW (ref 39–50)
HGB BLD-MCNC: 7.1 G/DL — LOW (ref 13–17)
IMM GRANULOCYTES NFR BLD AUTO: 0.9 % — SIGNIFICANT CHANGE UP (ref 0–0.9)
LYMPHOCYTES # BLD AUTO: 0.24 K/UL — LOW (ref 1–3.3)
LYMPHOCYTES # BLD AUTO: 5.3 % — LOW (ref 13–44)
MCHC RBC-ENTMCNC: 29.5 PG — SIGNIFICANT CHANGE UP (ref 27–34)
MCHC RBC-ENTMCNC: 32.6 G/DL — SIGNIFICANT CHANGE UP (ref 32–36)
MCV RBC AUTO: 90.5 FL — SIGNIFICANT CHANGE UP (ref 80–100)
MONOCYTES # BLD AUTO: 0.27 K/UL — SIGNIFICANT CHANGE UP (ref 0–0.9)
MONOCYTES NFR BLD AUTO: 5.9 % — SIGNIFICANT CHANGE UP (ref 2–14)
NEUTROPHILS # BLD AUTO: 3.99 K/UL — SIGNIFICANT CHANGE UP (ref 1.8–7.4)
NEUTROPHILS NFR BLD AUTO: 87.3 % — HIGH (ref 43–77)
NRBC # BLD: 0 /100 WBCS — SIGNIFICANT CHANGE UP (ref 0–0)
PLATELET # BLD AUTO: 326 K/UL — SIGNIFICANT CHANGE UP (ref 150–400)
POTASSIUM SERPL-MCNC: 4.5 MMOL/L — SIGNIFICANT CHANGE UP (ref 3.5–5.3)
POTASSIUM SERPL-SCNC: 4.5 MMOL/L — SIGNIFICANT CHANGE UP (ref 3.5–5.3)
PROT ?TM UR-MCNC: 46 MG/DL — HIGH (ref 0–12)
RBC # BLD: 2.41 M/UL — LOW (ref 4.2–5.8)
RBC # FLD: 18.8 % — HIGH (ref 10.3–14.5)
SODIUM SERPL-SCNC: 132 MMOL/L — LOW (ref 135–145)
WBC # BLD: 4.57 K/UL — SIGNIFICANT CHANGE UP (ref 3.8–10.5)
WBC # FLD AUTO: 4.57 K/UL — SIGNIFICANT CHANGE UP (ref 3.8–10.5)

## 2024-12-08 PROCEDURE — 99233 SBSQ HOSP IP/OBS HIGH 50: CPT | Mod: GC

## 2024-12-08 PROCEDURE — 99232 SBSQ HOSP IP/OBS MODERATE 35: CPT

## 2024-12-08 RX ORDER — ACETAMINOPHEN 500MG 500 MG/1
1000 TABLET, COATED ORAL ONCE
Refills: 0 | Status: COMPLETED | OUTPATIENT
Start: 2024-12-08 | End: 2024-12-08

## 2024-12-08 RX ADMIN — AZATHIOPRINE 50 MILLIGRAM(S): 100 TABLET ORAL at 05:45

## 2024-12-08 RX ADMIN — Medication 15 MILLILITER(S): at 13:53

## 2024-12-08 RX ADMIN — Medication 5 UNIT(S): at 17:05

## 2024-12-08 RX ADMIN — CARVEDILOL 12.5 MILLIGRAM(S): 25 TABLET, FILM COATED ORAL at 05:45

## 2024-12-08 RX ADMIN — Medication 50 MILLIGRAM(S): at 13:52

## 2024-12-08 RX ADMIN — CLONIDINE HYDROCHLORIDE 0.1 MILLIGRAM(S): 0.3 TABLET ORAL at 05:45

## 2024-12-08 RX ADMIN — Medication 500 MILLIGRAM(S): at 18:48

## 2024-12-08 RX ADMIN — POLYETHYLENE GLYCOL 3350 17 GRAM(S): 17 POWDER, FOR SOLUTION ORAL at 13:52

## 2024-12-08 RX ADMIN — AMLODIPINE BESYLATE 10 MILLIGRAM(S): 10 TABLET ORAL at 05:45

## 2024-12-08 RX ADMIN — ROSUVASTATIN CALCIUM 10 MILLIGRAM(S): 5 TABLET, FILM COATED ORAL at 22:04

## 2024-12-08 RX ADMIN — TACROLIMUS 2 MILLIGRAM(S): 5 CAPSULE ORAL at 13:52

## 2024-12-08 RX ADMIN — ESCITALOPRAM OXALATE 10 MILLIGRAM(S): 10 TABLET, FILM COATED ORAL at 18:47

## 2024-12-08 RX ADMIN — Medication 6: at 12:09

## 2024-12-08 RX ADMIN — CLONIDINE HYDROCHLORIDE 0.1 MILLIGRAM(S): 0.3 TABLET ORAL at 18:48

## 2024-12-08 RX ADMIN — TACROLIMUS 1 MILLIGRAM(S): 5 CAPSULE ORAL at 23:30

## 2024-12-08 RX ADMIN — ERTAPENEM 120 MILLIGRAM(S): 1 INJECTION, POWDER, LYOPHILIZED, FOR SOLUTION INTRAMUSCULAR; INTRAVENOUS at 05:45

## 2024-12-08 RX ADMIN — INSULIN GLARGINE 20 UNIT(S): 100 INJECTION, SOLUTION SUBCUTANEOUS at 22:13

## 2024-12-08 RX ADMIN — PANTOPRAZOLE SODIUM 40 MILLIGRAM(S): 40 TABLET, DELAYED RELEASE ORAL at 05:46

## 2024-12-08 RX ADMIN — HYDRALAZINE HYDROCHLORIDE 100 MILLIGRAM(S): 10 TABLET ORAL at 05:46

## 2024-12-08 RX ADMIN — Medication 500 MILLIGRAM(S): at 05:45

## 2024-12-08 RX ADMIN — Medication 325 MILLIGRAM(S): at 18:47

## 2024-12-08 RX ADMIN — PANTOPRAZOLE SODIUM 40 MILLIGRAM(S): 40 TABLET, DELAYED RELEASE ORAL at 18:47

## 2024-12-08 RX ADMIN — Medication 300 MILLIGRAM(S): at 13:52

## 2024-12-08 RX ADMIN — AZATHIOPRINE 50 MILLIGRAM(S): 100 TABLET ORAL at 19:25

## 2024-12-08 RX ADMIN — Medication 88 MICROGRAM(S): at 05:45

## 2024-12-08 RX ADMIN — HYDRALAZINE HYDROCHLORIDE 100 MILLIGRAM(S): 10 TABLET ORAL at 22:05

## 2024-12-08 RX ADMIN — Medication 325 MILLIGRAM(S): at 05:45

## 2024-12-08 RX ADMIN — ESCITALOPRAM OXALATE 10 MILLIGRAM(S): 10 TABLET, FILM COATED ORAL at 05:45

## 2024-12-08 RX ADMIN — ACETAMINOPHEN 500MG 400 MILLIGRAM(S): 500 TABLET, COATED ORAL at 23:29

## 2024-12-08 RX ADMIN — Medication 2 TABLET(S): at 22:04

## 2024-12-08 RX ADMIN — HYDRALAZINE HYDROCHLORIDE 100 MILLIGRAM(S): 10 TABLET ORAL at 15:35

## 2024-12-08 RX ADMIN — Medication 6: at 08:23

## 2024-12-08 RX ADMIN — CARVEDILOL 12.5 MILLIGRAM(S): 25 TABLET, FILM COATED ORAL at 18:47

## 2024-12-08 RX ADMIN — Medication 8: at 17:05

## 2024-12-08 RX ADMIN — LISINOPRIL 40 MILLIGRAM(S): 20 TABLET ORAL at 05:45

## 2024-12-08 NOTE — PROGRESS NOTE ADULT - ASSESSMENT
70yo M, PMH DM, HTN, HLD, h/o kidney transplant, hypothyroidism, presents to ED from [facility] for AMS, found to be febrile with positive UA. 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 Cardiology called for pericardial effusion.     Uncontrolled HTN/Pericardial Effusion  - CT chest: Small left pleural effusion with small loculated components, Small to moderate pericardial effusion  - TTE: EF 60%, trace pericardial effusion adjacent ot the posterior LV.  No significant valvular disease  - no signs of tamponade on examination    - No evidence of any meaningful volume overload   - Okay with IV fluids. He appears to be dry on exam. Encourage fluid/po intake     - EKG with nonspecific TWI anteriorly, repeat unchanged  - No evidence of any active ischemia   - Continue statin   - now off ASA in setting of persistent anemia (neg FOBT), with Hgb 6-7    - BP labile and uncontrolled 120-180's systolics   - continue antihypertensive agents on max Norvasc, Hydralazine and Lisinopril dose   - continue Clonidine increase to 0.1 mg PO TID   - continue coreg ( would uptitate next) if bp remains uncontrolled   - Monitor and replete Lytes. Keep K > 4 and Mg > 2    - Heme and GI following for anemia  - Ortho following for pathological sacral Fx with possible OM.   - No acute orthopedic surgical intervention at this time.  - Abx per ID     - Will continue to follow.    RUSSELL Farias  Nurse Practitioner - Cardiology   call TEAMS

## 2024-12-08 NOTE — PROGRESS NOTE ADULT - SUBJECTIVE AND OBJECTIVE BOX
Patient is a 69y old  Male who presents with a chief complaint of AMS (08 Dec 2024 10:26)    pt seen and examine today more awake , denies any c/o  , tolerating po ,  no distress.   INTERVAL HPI/OVERNIGHT EVENTS:     T(C): 36.8 (12-08-24 @ 11:15), Max: 36.9 (12-07-24 @ 13:31)  HR: 66 (12-08-24 @ 11:15) (66 - 70)  BP: 135/74 (12-08-24 @ 11:15) (127/73 - 183/66)  RR: 19 (12-08-24 @ 11:15) (16 - 19)  SpO2: 98% (12-08-24 @ 11:15) (96% - 98%)  Wt(kg): --  I&O's Summary    07 Dec 2024 07:01  -  08 Dec 2024 07:00  --------------------------------------------------------  IN: 0 mL / OUT: 2850 mL / NET: -2850 m          REVIEW OF SYSTEMS:  CONSTITUTIONAL: No fever, weight loss, no  fatigue  EYES: No eye pain, visual disturbances, or discharge  ENMT:  No difficulty hearing, tinnitus, vertigo; No sinus or throat pain  NECK: No pain or stiffness  RESPIRATORY: No cough, wheezing, chills ; No shortness of breath  CARDIOVASCULAR: No chest pain, palpitations, dizziness, or leg swelling  GASTROINTESTINAL: No abdominal or epigastric pain. No nausea, vomiting,  No diarrhea or constipation. No melena or hematochezia.  GENITOURINARY: No dysuria, frequency, hematuria, or incontinence  NEUROLOGICAL: No headaches, memory loss, loss of strength, numbness, or tremors  SKIN: No itching, burning, rashes, or lesions   MUSCULOSKELETAL: No joint pain or swelling; No muscle, back, or extremity pain    PHYSICAL EXAM:  GENERAL: NAD,  weak   HEAD:  Atraumatic, Normocephalic  EYES: EOMI, PERRLA, conjunctiva and sclera clear  ENMT  Moist mucous membranes  NECK: Supple, No JVD  NERVOUS SYSTEM:  Alert & Oriented X3; Motor Strength 5/5 B/L upper and lower extremities; DTRs 2+ intact and symmetric  CHEST/LUNG:   percussion bilaterally; No rales, rhonchi, wheezing,   HEART: Regular rate and rhythm; No murmurs,   ABDOMEN: Soft, Nontender, Nondistended; Bowel sounds present  EXTREMITIES:  2+ Peripheral Pulses, No clubbing, cyanosis, or edema  SKIN: No rashes or lesions / picc line intact     MEDICATIONS  (STANDING):  amLODIPine   Tablet 10 milliGRAM(s) Oral daily  ascorbic acid 500 milliGRAM(s) Oral two times a day  azaTHIOprine 50 milliGRAM(s) Oral two times a day  buPROPion XL (24-Hour) . 300 milliGRAM(s) Oral daily  carvedilol 12.5 milliGRAM(s) Oral every 12 hours  cloNIDine 0.1 milliGRAM(s) Oral every 12 hours  dextrose 5%. 1000 milliLiter(s) (100 mL/Hr) IV Continuous <Continuous>  dextrose 5%. 1000 milliLiter(s) (50 mL/Hr) IV Continuous <Continuous>  dextrose 50% Injectable 25 Gram(s) IV Push once  dextrose 50% Injectable 12.5 Gram(s) IV Push once  dextrose 50% Injectable 25 Gram(s) IV Push once  ertapenem  IVPB      ertapenem  IVPB 1000 milliGRAM(s) IV Intermittent every 24 hours  escitalopram 10 milliGRAM(s) Oral <User Schedule>  ferrous    sulfate 325 milliGRAM(s) Oral two times a day  glucagon  Injectable 1 milliGRAM(s) IntraMuscular once  hydrALAZINE 100 milliGRAM(s) Oral three times a day  insulin glargine Injectable (LANTUS) 20 Unit(s) SubCutaneous at bedtime  insulin lispro (ADMELOG) corrective regimen sliding scale   SubCutaneous three times a day before meals  insulin lispro Injectable (ADMELOG) 3 Unit(s) SubCutaneous three times a day before meals  levothyroxine 88 MICROGram(s) Oral <User Schedule>  lisinopril 40 milliGRAM(s) Oral daily  mineral oil enema 133 milliLiter(s) Rectal once  multivitamin/minerals/iron Oral Solution (CENTRUM) 15 milliLiter(s) Oral daily  pantoprazole    Tablet 40 milliGRAM(s) Oral two times a day  polyethylene glycol 3350 17 Gram(s) Oral daily  pyridoxine 50 milliGRAM(s) Oral daily  rosuvastatin 10 milliGRAM(s) Oral at bedtime  senna 2 Tablet(s) Oral at bedtime  tacrolimus 2 milliGRAM(s) Oral daily  tacrolimus 1 milliGRAM(s) Oral at bedtime    MEDICATIONS  (PRN):  acetaminophen   IVPB .. 1000 milliGRAM(s) IV Intermittent every 8 hours PRN Temp greater or equal to 38C (100.4F)  aluminum hydroxide/magnesium hydroxide/simethicone Suspension 30 milliLiter(s) Oral every 4 hours PRN Dyspepsia  dextrose Oral Gel 15 Gram(s) Oral once PRN Blood Glucose LESS THAN 70 milliGRAM(s)/deciliter  hydrALAZINE Injectable 10 milliGRAM(s) IV Push every 8 hours PRN SBP >180 and HR 60-70bpm  melatonin 3 milliGRAM(s) Oral at bedtime PRN Insomnia  metoprolol tartrate Injectable 5 milliGRAM(s) IV Push every 6 hours PRN SBP >170  ondansetron Injectable 4 milliGRAM(s) IV Push every 8 hours PRN Nausea and/or Vomiting      LABS:                        7.1    4.57  )-----------( 326      ( 08 Dec 2024 07:15 )             21.8     12-08    132[L]  |  100  |  19  ----------------------------<  263[H]  4.5   |  27  |  0.76    Ca    10.6[H]      08 Dec 2024 07:15        Urinalysis Basic - ( 08 Dec 2024 07:15 )    Color: x / Appearance: x / SG: x / pH: x  Gluc: 263 mg/dL / Ketone: x  / Bili: x / Urobili: x   Blood: x / Protein: x / Nitrite: x   Leuk Esterase: x / RBC: x / WBC x   Sq Epi: x / Non Sq Epi: x / Bacteria: x      CAPILLARY BLOOD GLUCOSE      POCT Blood Glucose.: 262 mg/dL (08 Dec 2024 11:32)  POCT Blood Glucose.: 280 mg/dL (08 Dec 2024 07:44)  POCT Blood Glucose.: 260 mg/dL (07 Dec 2024 21:37)  POCT Blood Glucose.: 298 mg/dL (07 Dec 2024 17:00)  POCT Blood Glucose.: 173 mg/dL (07 Dec 2024 12:00)              RADIOLOGY & ADDITIONAL TESTS:    Imaging Personally Reviewed:   mri abd pending

## 2024-12-08 NOTE — PROGRESS NOTE ADULT - ASSESSMENT
IMPRESSION  Anemia multifactorial in etiology related to prior renal transplant with meds and now with osteomyelitis and sepsis with Gram neg maury sepsis  Blood and urine culture with E. 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   With prior renal transplant would use irradiated PRBC and try to keep transfusions to a minimum.     MGUS and hypercalcemia  VitD1,25OH 102.0, VitD 39.2  Continue renal evaluation.   Note calcium increased 11.7==>11.2==>10.6  discussed with Dr. Bruce  continue management of hypercalciemia    biopsy does not appear to rule out lymphoma  to have MRI with contrast  to have films review by IR for possible repeat biopsy    continue ID and orthopedic management of osteomyelitis    d/w Dr Upton

## 2024-12-08 NOTE — PROGRESS NOTE ADULT - SUBJECTIVE AND OBJECTIVE BOX
[facility]    Remi Sampson NP    [address] [place] [id number]  [phone]      Chief Complaint:  Patient is a 69y old  Male who presents with a chief complaint of AMS (06 Dec 2024 11:27)      HPI/ 24 hr events:   Patient seen and examined   no new events         REVIEW OF SYSTEMS:   General: Negative  HEENT: Negative  CV: Negative  Respiratory: Negative  GI: See HPI  : Negative  MSK: Negative  Hematologic: Negative  Skin: Negative    MEDICATIONS:   MEDICATIONS  (STANDING):  amLODIPine   Tablet 10 milliGRAM(s) Oral daily  ascorbic acid 500 milliGRAM(s) Oral two times a day  aspirin enteric coated 81 milliGRAM(s) Oral daily  azaTHIOprine 50 milliGRAM(s) Oral two times a day  buPROPion XL (24-Hour) . 300 milliGRAM(s) Oral daily  carvedilol 12.5 milliGRAM(s) Oral every 12 hours  cloNIDine 0.1 milliGRAM(s) Oral every 12 hours  dextrose 5%. 1000 milliLiter(s) (100 mL/Hr) IV Continuous <Continuous>  dextrose 5%. 1000 milliLiter(s) (50 mL/Hr) IV Continuous <Continuous>  dextrose 50% Injectable 25 Gram(s) IV Push once  dextrose 50% Injectable 12.5 Gram(s) IV Push once  dextrose 50% Injectable 25 Gram(s) IV Push once  ertapenem  IVPB      ertapenem  IVPB 1000 milliGRAM(s) IV Intermittent every 24 hours  escitalopram 10 milliGRAM(s) Oral <User Schedule>  ferrous    sulfate 325 milliGRAM(s) Oral two times a day  glucagon  Injectable 1 milliGRAM(s) IntraMuscular once  hydrALAZINE 100 milliGRAM(s) Oral three times a day  insulin glargine Injectable (LANTUS) 20 Unit(s) SubCutaneous at bedtime  insulin lispro (ADMELOG) corrective regimen sliding scale   SubCutaneous three times a day before meals  levothyroxine 88 MICROGram(s) Oral <User Schedule>  lisinopril 40 milliGRAM(s) Oral daily  mineral oil enema 133 milliLiter(s) Rectal once  multivitamin/minerals/iron Oral Solution (CENTRUM) 15 milliLiter(s) Oral daily  pamidronate IVPB 60 milliGRAM(s) IV Intermittent once  pantoprazole    Tablet 40 milliGRAM(s) Oral two times a day  polyethylene glycol 3350 17 Gram(s) Oral daily  pyridoxine 50 milliGRAM(s) Oral daily  rosuvastatin 10 milliGRAM(s) Oral at bedtime  senna 2 Tablet(s) Oral at bedtime  tacrolimus 2 milliGRAM(s) Oral daily  tacrolimus 1 milliGRAM(s) Oral at bedtime    MEDICATIONS  (PRN):  acetaminophen   IVPB .. 1000 milliGRAM(s) IV Intermittent every 8 hours PRN Temp greater or equal to 38C (100.4F)  aluminum hydroxide/magnesium hydroxide/simethicone Suspension 30 milliLiter(s) Oral every 4 hours PRN Dyspepsia  dextrose Oral Gel 15 Gram(s) Oral once PRN Blood Glucose LESS THAN 70 milliGRAM(s)/deciliter  hydrALAZINE Injectable 10 milliGRAM(s) IV Push every 8 hours PRN SBP >180 and HR 60-70bpm  melatonin 3 milliGRAM(s) Oral at bedtime PRN Insomnia  metoprolol tartrate Injectable 5 milliGRAM(s) IV Push every 6 hours PRN SBP >170  ondansetron Injectable 4 milliGRAM(s) IV Push every 8 hours PRN Nausea and/or Vomiting      ALLERGIES:   Allergies    No Known Allergies    Intolerances        VITAL SIGNS:   Vital Signs Last 24 Hrs  T(C): 36.8 (06 Dec 2024 10:30), Max: 36.8 (06 Dec 2024 10:30)  T(F): 98.3 (06 Dec 2024 10:30), Max: 98.3 (06 Dec 2024 10:30)  HR: 76 (06 Dec 2024 10:30) (76 - 80)  BP: 150/64 (06 Dec 2024 10:30) (148/62 - 188/70)  BP(mean): --  RR: 18 (06 Dec 2024 04:25) (18 - 18)  SpO2: 94% (06 Dec 2024 04:25) (94% - 96%)    Parameters below as of 06 Dec 2024 04:25  Patient On (Oxygen Delivery Method): room air      I&O's Summary    05 Dec 2024 07:01  -  06 Dec 2024 07:00  --------------------------------------------------------  IN: 0 mL / OUT: 450 mL / NET: -450 mL    06 Dec 2024 07:01  -  06 Dec 2024 12:01  --------------------------------------------------------  IN: 0 mL / OUT: 500 mL / NET: -500 mL        PHYSICAL EXAM:   GENERAL:  No acute distress  HEENT:  NC/AT  CHEST:  No increased effort  HEART:  Regular rate  ABDOMEN:  Soft, non-tender, non-distended, positive bowel sounds  EXTREMITIES: No edema, no cyanosis  SKIN:  Warm, dry  NEURO:  Calm, cooperative    LABS:                        7.8    4.90  )-----------( 339      ( 06 Dec 2024 06:50 )             23.9     12-06    136  |  103  |  19  ----------------------------<  276[H]  4.0   |  30  |  0.77    Ca    11.1[H]      06 Dec 2024 06:50                Hepatitis B Core Antibody, Total: Nonreact (12-04-24 @ 16:06)  Hepatitis B Surface Antibody: Reactive (12-04-24 @ 16:06)  Hepatitis B Surface Antigen: Nonreact (12-04-24 @ 16:06)  Hepatitis C Virus S/CO Ratio: 0.29 S/CO (12-04-24 @ 16:06)                            RADIOLOGY & ADDITIONAL STUDIES:

## 2024-12-08 NOTE — PROGRESS NOTE ADULT - SUBJECTIVE AND OBJECTIVE BOX
Interval History:  remains weak, lethargic  Chart reviewed and events noted;   Overnight events:    MEDICATIONS  (STANDING):  amLODIPine   Tablet 10 milliGRAM(s) Oral daily  ascorbic acid 500 milliGRAM(s) Oral two times a day  azaTHIOprine 50 milliGRAM(s) Oral two times a day  buPROPion XL (24-Hour) . 300 milliGRAM(s) Oral daily  carvedilol 12.5 milliGRAM(s) Oral every 12 hours  cloNIDine 0.1 milliGRAM(s) Oral every 12 hours  dextrose 5%. 1000 milliLiter(s) (50 mL/Hr) IV Continuous <Continuous>  dextrose 5%. 1000 milliLiter(s) (100 mL/Hr) IV Continuous <Continuous>  dextrose 50% Injectable 25 Gram(s) IV Push once  dextrose 50% Injectable 12.5 Gram(s) IV Push once  dextrose 50% Injectable 25 Gram(s) IV Push once  ertapenem  IVPB      ertapenem  IVPB 1000 milliGRAM(s) IV Intermittent every 24 hours  escitalopram 10 milliGRAM(s) Oral <User Schedule>  ferrous    sulfate 325 milliGRAM(s) Oral two times a day  glucagon  Injectable 1 milliGRAM(s) IntraMuscular once  hydrALAZINE 100 milliGRAM(s) Oral three times a day  insulin glargine Injectable (LANTUS) 20 Unit(s) SubCutaneous at bedtime  insulin lispro (ADMELOG) corrective regimen sliding scale   SubCutaneous three times a day before meals  insulin lispro Injectable (ADMELOG) 5 Unit(s) SubCutaneous three times a day before meals  levothyroxine 88 MICROGram(s) Oral <User Schedule>  lisinopril 40 milliGRAM(s) Oral daily  mineral oil enema 133 milliLiter(s) Rectal once  multivitamin/minerals/iron Oral Solution (CENTRUM) 15 milliLiter(s) Oral daily  pantoprazole    Tablet 40 milliGRAM(s) Oral two times a day  polyethylene glycol 3350 17 Gram(s) Oral daily  pyridoxine 50 milliGRAM(s) Oral daily  rosuvastatin 10 milliGRAM(s) Oral at bedtime  senna 2 Tablet(s) Oral at bedtime  tacrolimus 2 milliGRAM(s) Oral daily  tacrolimus 1 milliGRAM(s) Oral at bedtime    MEDICATIONS  (PRN):  acetaminophen   IVPB .. 1000 milliGRAM(s) IV Intermittent every 8 hours PRN Temp greater or equal to 38C (100.4F)  aluminum hydroxide/magnesium hydroxide/simethicone Suspension 30 milliLiter(s) Oral every 4 hours PRN Dyspepsia  dextrose Oral Gel 15 Gram(s) Oral once PRN Blood Glucose LESS THAN 70 milliGRAM(s)/deciliter  hydrALAZINE Injectable 10 milliGRAM(s) IV Push every 8 hours PRN SBP >180 and HR 60-70bpm  melatonin 3 milliGRAM(s) Oral at bedtime PRN Insomnia  metoprolol tartrate Injectable 5 milliGRAM(s) IV Push every 6 hours PRN SBP >170  ondansetron Injectable 4 milliGRAM(s) IV Push every 8 hours PRN Nausea and/or Vomiting      Vital Signs Last 24 Hrs  T(C): 36.8 (08 Dec 2024 11:15), Max: 36.8 (08 Dec 2024 11:15)  T(F): 98.2 (08 Dec 2024 11:15), Max: 98.2 (08 Dec 2024 11:15)  HR: 66 (08 Dec 2024 11:15) (66 - 70)  BP: 135/74 (08 Dec 2024 11:15) (127/73 - 183/66)  BP(mean): --  RR: 19 (08 Dec 2024 11:15) (16 - 19)  SpO2: 98% (08 Dec 2024 11:15) (96% - 98%)    Parameters below as of 08 Dec 2024 11:15  Patient On (Oxygen Delivery Method): room air        PHYSICAL EXAM  General: adult in NAD  HEENT: clear oropharynx, anicteric sclera, pink conjunctivae  Neck: supple  CV: normal S1S2 with no murmur rubs or gallops  Lungs: clear to auscultation, no wheezes, no rhales  Abdomen: soft non-tender non-distended, no hepato/splenomegaly  Ext: no clubbing cyanosis or edema  Skin: no rashes and no petichiae  Neuro: alert and oriented X3 no focal deficits      LABS:  CBC Full  -  ( 08 Dec 2024 07:15 )  WBC Count : 4.57 K/uL  RBC Count : 2.41 M/uL  Hemoglobin : 7.1 g/dL  Hematocrit : 21.8 %  Platelet Count - Automated : 326 K/uL  Mean Cell Volume : 90.5 fl  Mean Cell Hemoglobin : 29.5 pg  Mean Cell Hemoglobin Concentration : 32.6 g/dL  Auto Neutrophil # : 3.99 K/uL  Auto Lymphocyte # : 0.24 K/uL  Auto Monocyte # : 0.27 K/uL  Auto Eosinophil # : 0.02 K/uL  Auto Basophil # : 0.01 K/uL  Auto Neutrophil % : 87.3 %  Auto Lymphocyte % : 5.3 %  Auto Monocyte % : 5.9 %  Auto Eosinophil % : 0.4 %  Auto Basophil % : 0.2 %    12-08    132[L]  |  100  |  19  ----------------------------<  263[H]  4.5   |  27  |  0.76    Ca    10.6[H]      08 Dec 2024 07:15          fe studies  Iron - Total Binding Capacity.: 182 ug/dL (12-05 @ 10:00)  Ferritin: 973 ng/mL (12-05 @ 10:00)      WBC trend  4.57 K/uL (12-08-24 @ 07:15)  4.46 K/uL (12-07-24 @ 12:14)  4.90 K/uL (12-06-24 @ 06:50)      Hgb trend  7.1 g/dL (12-08-24 @ 07:15)  7.5 g/dL (12-07-24 @ 12:14)  7.8 g/dL (12-06-24 @ 06:50)      plt trend  326 K/uL (12-08-24 @ 07:15)  365 K/uL (12-07-24 @ 12:14)  339 K/uL (12-06-24 @ 06:50)        RADIOLOGY & ADDITIONAL STUDIES:

## 2024-12-08 NOTE — PROGRESS NOTE ADULT - SUBJECTIVE AND OBJECTIVE BOX
[facility] -- Tom Rahman Pannella, Patel, Savella, Goodger, Cohen  Office # [phone]    Follow Up: Uncontrolled HTN    Subjective/Observations: seen and examined, awake, alert in bed, unable to provide meaningful information at this time. Tolerating room air. no events noted overnight     REVIEW OF SYSTEMS: All other review of systems is negative unless indicated above  PAST MEDICAL & SURGICAL HISTORY:  Diabetes Mellitus Type II  Insulin pump (2010)      GERD (gastroesophageal reflux disease)      Depression      Hypothyroidism      Hyperlipidemia      Kidney transplanted  2012      Hypertension      ANGELIKA (obstructive sleep apnea)      Peripheral neuropathy      Shoulder fracture  Left.      History of colonoscopy      S/P kidney transplant  2012      S/P cholecystectomy      Retroperitoneal fibrosis  s/p surgery      AV fistula  Right arm      S/P right cataract extraction      S/P left cataract extraction      H/O unilateral nephrectomy  Left        MEDICATIONS  (STANDING):  amLODIPine   Tablet 10 milliGRAM(s) Oral daily  ascorbic acid 500 milliGRAM(s) Oral two times a day  azaTHIOprine 50 milliGRAM(s) Oral two times a day  buPROPion XL (24-Hour) . 300 milliGRAM(s) Oral daily  carvedilol 12.5 milliGRAM(s) Oral every 12 hours  cloNIDine 0.1 milliGRAM(s) Oral every 12 hours  dextrose 5%. 1000 milliLiter(s) (100 mL/Hr) IV Continuous <Continuous>  dextrose 5%. 1000 milliLiter(s) (50 mL/Hr) IV Continuous <Continuous>  dextrose 50% Injectable 25 Gram(s) IV Push once  dextrose 50% Injectable 12.5 Gram(s) IV Push once  dextrose 50% Injectable 25 Gram(s) IV Push once  ertapenem  IVPB 1000 milliGRAM(s) IV Intermittent every 24 hours  ertapenem  IVPB      escitalopram 10 milliGRAM(s) Oral <User Schedule>  ferrous    sulfate 325 milliGRAM(s) Oral two times a day  glucagon  Injectable 1 milliGRAM(s) IntraMuscular once  hydrALAZINE 100 milliGRAM(s) Oral three times a day  insulin glargine Injectable (LANTUS) 20 Unit(s) SubCutaneous at bedtime  insulin lispro (ADMELOG) corrective regimen sliding scale   SubCutaneous three times a day before meals  insulin lispro Injectable (ADMELOG) 3 Unit(s) SubCutaneous three times a day before meals  levothyroxine 88 MICROGram(s) Oral <User Schedule>  lisinopril 40 milliGRAM(s) Oral daily  mineral oil enema 133 milliLiter(s) Rectal once  multivitamin/minerals/iron Oral Solution (CENTRUM) 15 milliLiter(s) Oral daily  pantoprazole    Tablet 40 milliGRAM(s) Oral two times a day  polyethylene glycol 3350 17 Gram(s) Oral daily  pyridoxine 50 milliGRAM(s) Oral daily  rosuvastatin 10 milliGRAM(s) Oral at bedtime  senna 2 Tablet(s) Oral at bedtime  tacrolimus 2 milliGRAM(s) Oral daily  tacrolimus 1 milliGRAM(s) Oral at bedtime    MEDICATIONS  (PRN):  acetaminophen   IVPB .. 1000 milliGRAM(s) IV Intermittent every 8 hours PRN Temp greater or equal to 38C (100.4F)  aluminum hydroxide/magnesium hydroxide/simethicone Suspension 30 milliLiter(s) Oral every 4 hours PRN Dyspepsia  dextrose Oral Gel 15 Gram(s) Oral once PRN Blood Glucose LESS THAN 70 milliGRAM(s)/deciliter  hydrALAZINE Injectable 10 milliGRAM(s) IV Push every 8 hours PRN SBP >180 and HR 60-70bpm  melatonin 3 milliGRAM(s) Oral at bedtime PRN Insomnia  metoprolol tartrate Injectable 5 milliGRAM(s) IV Push every 6 hours PRN SBP >170  ondansetron Injectable 4 milliGRAM(s) IV Push every 8 hours PRN Nausea and/or Vomiting    Allergies    No Known Allergies    Intolerances      Vital Signs Last 24 Hrs  T(C): 36.6 (08 Dec 2024 04:57), Max: 36.9 (07 Dec 2024 13:31)  T(F): 97.8 (08 Dec 2024 04:57), Max: 98.4 (07 Dec 2024 13:31)  HR: 70 (08 Dec 2024 04:57) (66 - 72)  BP: 183/66 (08 Dec 2024 04:57) (127/73 - 183/66)  BP(mean): --  RR: 18 (08 Dec 2024 04:57) (16 - 18)  SpO2: 97% (08 Dec 2024 04:57) (95% - 97%)    Parameters below as of 08 Dec 2024 04:57  Patient On (Oxygen Delivery Method): room air      I&O's Summary    07 Dec 2024 07:01  -  08 Dec 2024 07:00  --------------------------------------------------------  IN: 0 mL / OUT: 2850 mL / NET: -2850 mL          TELE: Not on telemetry   PHYSICAL EXAM:  Constitutional: NAD, awake and alert  HEENT: Moist Mucous Membranes, Anicteric  Pulmonary: Non-labored, breath sounds are clear bilaterally, No wheezing, rales or rhonchi  Cardiovascular: Regular, S1 and S2, No murmurs, rubs, gallops or clicks  Gastrointestinal: Bowel Sounds present, soft, nontender.   Lymph: No peripheral edema. No lymphadenopathy.  Skin: No visible rashes or ulcers.  Psych:  Mood & affect flat, pleasantly confused  LABS: All Labs Reviewed:             7.1    4.57  )-----------( 326      ( 08 Dec 2024 07:15 )             21.8                         7.5    4.46  )-----------( 365      ( 07 Dec 2024 12:14 )             23.6                         7.8    4.90  )-----------( 339      ( 06 Dec 2024 06:50 )             23.9     08 Dec 2024 07:15    132    |  100    |  19     ----------------------------<  263    4.5     |  27     |  0.76   07 Dec 2024 12:14    136    |  101    |  21     ----------------------------<  195    4.2     |  31     |  0.79   06 Dec 2024 06:50    136    |  103    |  19     ----------------------------<  276    4.0     |  30     |  0.77     Ca    10.6       08 Dec 2024 07:15  Ca    11.0       07 Dec 2024 12:14  Ca    11.1       06 Dec 2024 06:50            12 Lead ECG:   Ventricular Rate 81 BPM    Atrial Rate 81 BPM    P-R Interval 148 ms    QRS Duration 104 ms    Q-T Interval 406 ms    QTC Calculation(Bazett) 471 ms    P Axis 44 degrees    R Axis 12 degrees    T Axis 53 degrees    Diagnosis Line Normal sinus rhythm  Normal ECG  When compared with ECG of 29-NOV-2024 07:11,  Nonspecific T wave abnormality, improved in Lateral leads  Confirmed by Kya Gonzalez ([id number]) on 12/1/2024 10:43:18 AM (12-01-24 @ 09:28)      TRANSTHORACIC ECHOCARDIOGRAM REPORT  ________________________________________________________________________________                                      _______       Pt. Name:       JAN CALVIN Study Date:    11/29/2024  MRN:            [id number]          YOB: 1955  Accession #:    [id number]         Age:           69 years  Account#:       [id number]        Gender:        M  Heart Rate:                       Height:        64.17 in (163.00 cm)  Rhythm:       Weight:        169.75 lb (77.00 kg)  Blood Pressure: 196/81 mmHg       BSA/BMI:       1.83 m² / 28.98 kg/m²  ________________________________________________________________________________________  Referring Physician:    [id number] Ale Ibrahim  Interpreting Physician: Kya Gonzalez MD  Primary Sonographer:    Butch Salazar    CPT:               ECHO TTE WO CON COMP W DOPP - 90714.m  Indication(s):     Cardiac murmur, unspecified - R01.1  Procedure:         Transthoracic echocardiogram with 2-D, M-mode and complete                     spectral and color flow Doppler.  Ordering Location: [facility]  Admission Status:  ED    _______________________________________________________________________________________     CONCLUSIONS:      1. Left ventricular cavity is normal in size. Left ventricular systolic function is normal with an ejection fraction of 60 % by Moreno's method of disks.   2. Trace pericardial effusion noted adjacent to the posterior left ventricle.   3. Normal right ventricular cavity size and normal right ventricular systolic function.   4. Aortic valve anatomy cannot be determined with normal systolic excursion. There is calcification of the aortic valve leaflets.   5. Structurally normal mitral valve with normalleaflet excursion.   6. Structurally normal tricuspid valve with normal leaflet excursion. Trace tricuspid regurgitation.   7. The inferior vena cava is normal in size measuring 1.36 cm in diameter, (normal <2.1cm) with normal inspiratory collapse (normal >50%) consistent with normal right atrial pressure (~3, range 0-5mmHg).    ________________________________________________________________________________________  FINDINGS:     Left Ventricle:  The left ventricular cavity is normal in size. Left ventricular systolic function is normal with a calculated ejection fraction of 60 % by the Moreno's biplane method of disks.     Right Ventricle:  The right ventricular cavity is normal in size and right ventricular systolic function is normal. Tricuspid annular plane systolic excursion (TAPSE) is 2.9 cm (normal >=1.7 cm).     Left Atrium:  The left atrium is normal in size with an indexed volume of 33.15 ml/m².     Right Atrium:  The right atrium is normal in size with an indexed volume of 21.17 ml/m².     Interatrial Septum:  The interatrial septum appears intact.     Aortic Valve:  The aortic valve anatomy cannot be determined with normal systolic excursion. There is calcification of the aortic valve leaflets. The peak transaortic velocity is 2.15 m/s, peak transaortic gradient is 18.5 mmHg and mean transaortic gradient is 11.0 mmHg with an LVOT/aortic valve VTI ratio of 0.64. The aortic valve area is estimated at 2.67 cm² by the continuity equation. There is no evidence of aortic regurgitation.     Mitral Valve:  Structurally normal mitral valve with normal leaflet excursion. There is calcification of the mitral valve annulus.     Tricuspid Valve:  Structurally normal tricuspid valve with normal leaflet excursion. There is trace tricuspid regurgitation. Estimated pulmonary artery systolic pressure is 8 mmHg.     Aorta:  The aortic root at the sinuses of Valsalva is normal in size, measuring 3.50 cm (indexed 1.91 cm/m²). The ascending aorta is dilated, measuring 4.10 cm (indexed 2.24 cm/m²).     Pericardium:  There is a trace pericardial effusion noted adjacent to the posterior left ventricle.     Systemic Veins:  The inferior vena cava is normal in size measuring 1.36 cm in diameter, (normal <2.1cm) with normal inspiratory collapse (normal >50%) consistent with normal right atrial pressure (~3, range 0-5mmHg).  ____________________________________________________________________  QUANTITATIVE DATA:  Left Ventricle Measurements: (Indexed to BSA)     IVSd (2D):   1.0 cm  LVPWd (2D):  1.0 cm  LVIDd (2D):  5.3 cm  LVIDs (2D):  3.6 cm  LV Mass:     203 g  111.2 g/m²  LV Vol d, MOD A2C: 97.8 ml  53.50 ml/m²  LV Vol d, MOD A4C: 121.0 ml 66.19 ml/m²  LV Vol d, MOD BP:  109.5 ml 59.90 ml/m²  LV Vol s, MOD A2C: 36.4 ml  19.91 ml/m²  LV Vol s, MOD A4C: 49.5 ml  27.08 ml/m²  LV Vol s, MOD BP:  44.0 ml  24.04 ml/m²  LVOT SV MOD BP:    65.5 ml  LV EF% MOD BP:     60 %     MV E Vmax:    1.02 m/s  MV A Vmax:    0.93 m/s  MV E/A:       1.10  e' lateral:   13.10 cm/s  e' medial:    9.25 cm/s  E/e' lateral: 7.79  E/e' medial:  11.03  E/e' Average: 9.13  MV DT:        151 msec    Aorta Measurements: (Normal range) (Indexed to BSA)     Ao Root d     3.50 cm (3.1 - 3.7 cm) 1.91 cm/m²  Ao Asc d, 2D: 4.10  Ao Asc prox:  4.10 cm               2.24 cm/m²            Left Atrium Measurements: (Indexed to BSA)  LA Diam 2D: 4.40 cm         Right Ventricle Measurements: Right Atrial Measurements:     TAPSE:            2.9 cm      RA Vol:            38.70 ml  RV Base (RVID1):  3.7cm      RA Vol s, MOD A4C  38.7 ml  RV Mid (RVID2):   3.1 cm      RA Vol Index:      21.17 ml/m²  RV Major (RVID3): 8.0 cm      RA Area s, MOD A4C 14.7 cm²       LVOT / RVOT/ Qp/Qs Data: (Indexed to BSA)  LVOT Diameter:  2.30 cm  LVOT Area:      4.15cm²  LVOT Vmax:      1.34 m/s  LVOT Vmn:       0.949 m/s  LVOT VTI:       26.30 cm  LVOT peak grad: 7 mmHg  LVOT mean grad: 4.0 mmHg  LVOT SV:        109.3 ml  59.78 ml/m²    Aortic Valve Measurements:  AV Vmax:                2.2 m/s  AV Peak Gradient:       18.5 mmHg  AV Mean Gradient:       11.0 mmHg  AV VTI:                 41.0 cm  AV VTI Ratio:           0.64  AoV EOA, Contin:        2.67 cm²  AoV EOA, Contin i:      1.46 cm²/m²  AoV Dimensionless Index 0.64    Mitral Valve Measurements:     MV E Vmax: 1.0 m/s  MV A Vmax: 0.9 m/s  MV E/A:    1.1       Tricuspid Valve Measurements:     TR Vmax:          1.2 m/s  TR Peak Gradient: 5.3 mmHg  RA Pressure:      3 mmHg  PASP:             8 mmHg    ________________________________________________________________________________________  Electronically signed on 11/30/2024 at 1:57:15 PM by Kya Gonzalez MD         *** Final ***

## 2024-12-08 NOTE — PROGRESS NOTE ADULT - ASSESSMENT
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 component of AIH        I reviewed the overnight course of events on the unit, re-confirming the patient history. I discussed the care with the patient.  Differential diagnosis and plan of care discussed with patient after the evaluation.  35 minutes spent on total encounter of which more than fifty percent of the encounter was spent counseling and/or coordinating care by the attending physician.

## 2024-12-08 NOTE — PROGRESS NOTE ADULT - ASSESSMENT
Patient is a 68yo M, PMH DM, HTN, HLD, h/o kidney transplant, hypothyroidism, presents to ED from [facility] for AMS likely  acute  metabolic encephalopathy       AMS 2/2 Sepsispoa  2/2 multiple infections (UTI, sacral infection, possible osteomyelitis), E coli bacteremia  cause of acute  metabolic encephalopathy   Sepsis 2/2 ESBL Ecoli UTI and bacteremia. sensitive to ertapenem.  - C/W  Meropenum 1gm q24h x10 day course until 12/10 d/w  ID Dr. Saul with  midline   -Tylenol PRN pain and fever- diet as tolerated   - aspiration and fall risk - Continue Senna, Miralax, PRN dulcolax suppositories   - MR pelvis/sacrum to eval for osteomyelitis- last    Again seen are comminuted bilateral sacral lona fractures with marked   osseous edema and marked loss of normal T1 fatty marrow. Osseous edema   with loss of normal T1 fatty marrow at the caudal aspect of the left   posterior iliac bone adjacent to the sacroiliac joint. Previously seen   fracture component is better visualized on CT. These findings could be   secondary to extensive fracture deformities in an osteopenic patient   versus osteomyelitis if there was a history of a soft tissue ulcer   extending to bone versus metastatic disease given the marked loss of T1   fatty marrow if there is a history of malignancy. Clinical correlation   with patient history is advised.  - Per Ortho no surgical intervention for fracture of sacrum  / nees dolores     Acute on Chronic anemia :  - Likely due to infection,  and CKD- No signs of acute  bleeding noted    - Iron22/ sat low    iron deficiency with chr anemia  +  -- D/w Hem Dr. Sal  With prior renal transplant would use irradiated PRBC and try to keep transfusions to a minimum.     s/p   1 unit prbc - hb  fu closely.     Hepatic lesion  CT abd with 5.5cm lesion on R hepatic lobe also noticed  retroperitoneal  fibrosis  and spleen mass      hem/onc   DR SAL / and nephro dr vizcarra   would like to  repeat   liver biopsy by ir next wk   after mri abd     -     histopath not fully  reported by  [facility] in sept /2024   paper work  . wife aware about liver and spleen  mass and  benin last biopsy report  - ok for repeat biopsy if needed .     Diabetes Mellitus / hyperglycemia  dm DietPre meal Insulin  5 unit tid, Lantus to 20 u QHS  fingerstick glucose closely follow up.   ISS A1c 7.4 -     HTN: Uncontrolled   Continue Lisinopril 20mg  increase to 40 mg  daily   Continue Hydralazine 100mg TID with hold parameters  Continue Coreg, switched from Metoprolol - 12.5 mg po bid  Continue Amlodipine 10mg daily -  add on clonidine 0.1 bid   - fu bp closely   HLD  Continue Crestor 10mg daily    s/p Kidney transplant/CKD 3  Continue Tacrolimus 2mg daily  Continue Tacrolimus 1mg QHS  Continue Azathioprine 50mg BID  Nephrology consulted dr vizcarra     Hypothyroidism  Continue Levothyroxine 88mcg QAM     hypercalcemia was possible  sec to  underlying  lymphoproliferative dis  sec to vit D  s/p  calcitonin   on  Aredia -   ca  improving .  Depression  Continue Escitalopram 10mg TID  Continue Bupropion 300mg daily    hx  liver mass and spleen mass - per wife  aware / hem onc dr sal following , mri abd pelvis pending  after that possible ir guided liver biopsy / hold on asa.     DVT ppx: Hold AC due to anemia and risk of bleeding           Dispo: JOCELYNN bernal

## 2024-12-08 NOTE — PROGRESS NOTE ADULT - TIME BILLING
Note written by attending. Patient seen and examined. Meds, labs, vitals, chart reviewed.   pt have hx  liver mass / speen mass and retroperitoneal fibrosis  -  as per  hem/onc dr corona will need  ir guided  repeat   liver biopsy  next wk / dc asa  and mri abd  pending.

## 2024-12-09 LAB
% ALBUMIN: SIGNIFICANT CHANGE UP %
% ALPHA 1: SIGNIFICANT CHANGE UP %
% ALPHA 2: SIGNIFICANT CHANGE UP %
% BETA: SIGNIFICANT CHANGE UP %
% GAMMA: SIGNIFICANT CHANGE UP %
ACE SERPL-CCNC: 6 U/L — LOW (ref 14–82)
ALBUMIN SERPL ELPH-MCNC: SIGNIFICANT CHANGE UP G/DL (ref 3.6–5.5)
ALBUMIN/GLOB SERPL ELPH: SIGNIFICANT CHANGE UP RATIO
ALPHA1 GLOB SERPL ELPH-MCNC: SIGNIFICANT CHANGE UP G/DL (ref 0.1–0.4)
ALPHA2 GLOB SERPL ELPH-MCNC: SIGNIFICANT CHANGE UP G/DL (ref 0.5–1)
ANION GAP SERPL CALC-SCNC: 6 MMOL/L — SIGNIFICANT CHANGE UP (ref 5–17)
B-GLOBULIN SERPL ELPH-MCNC: SIGNIFICANT CHANGE UP G/DL (ref 0.5–1)
BUN SERPL-MCNC: 22 MG/DL — SIGNIFICANT CHANGE UP (ref 7–23)
CALCIUM SERPL-MCNC: 10.7 MG/DL — HIGH (ref 8.5–10.1)
CHLORIDE SERPL-SCNC: 97 MMOL/L — SIGNIFICANT CHANGE UP (ref 96–108)
CO2 SERPL-SCNC: 29 MMOL/L — SIGNIFICANT CHANGE UP (ref 22–31)
CREAT SERPL-MCNC: 0.89 MG/DL — SIGNIFICANT CHANGE UP (ref 0.5–1.3)
DEPRECATED KAPPA LC FREE/LAMBDA SER: 7.32 RATIO — HIGH (ref 0.7–6.02)
EGFR: 94 ML/MIN/1.73M2 — SIGNIFICANT CHANGE UP
GAMMA GLOBULIN: SIGNIFICANT CHANGE UP G/DL (ref 0.6–1.6)
GLUCOSE BLDC GLUCOMTR-MCNC: 182 MG/DL — HIGH (ref 70–99)
GLUCOSE BLDC GLUCOMTR-MCNC: 211 MG/DL — HIGH (ref 70–99)
GLUCOSE BLDC GLUCOMTR-MCNC: 213 MG/DL — HIGH (ref 70–99)
GLUCOSE BLDC GLUCOMTR-MCNC: 222 MG/DL — HIGH (ref 70–99)
GLUCOSE SERPL-MCNC: 194 MG/DL — HIGH (ref 70–99)
HCT VFR BLD CALC: 21.3 % — LOW (ref 39–50)
HGB BLD-MCNC: 7 G/DL — CRITICAL LOW (ref 13–17)
KAPPA LC UR-MCNC: 725.68 MG/L — HIGH
LAMBDA LC UR-MCNC: 99.2 MG/L — HIGH
MCHC RBC-ENTMCNC: 29.5 PG — SIGNIFICANT CHANGE UP (ref 27–34)
MCHC RBC-ENTMCNC: 32.9 G/DL — SIGNIFICANT CHANGE UP (ref 32–36)
MCV RBC AUTO: 89.9 FL — SIGNIFICANT CHANGE UP (ref 80–100)
PLATELET # BLD AUTO: 332 K/UL — SIGNIFICANT CHANGE UP (ref 150–400)
POTASSIUM SERPL-MCNC: 4.2 MMOL/L — SIGNIFICANT CHANGE UP (ref 3.5–5.3)
POTASSIUM SERPL-SCNC: 4.2 MMOL/L — SIGNIFICANT CHANGE UP (ref 3.5–5.3)
PROT PATTERN SERPL ELPH-IMP: SIGNIFICANT CHANGE UP
PROT SERPL-MCNC: SIGNIFICANT CHANGE UP
PTH RELATED PROT SERPL-MCNC: <2 PMOL/L — SIGNIFICANT CHANGE UP
RBC # BLD: 2.37 M/UL — LOW (ref 4.2–5.8)
RBC # FLD: 18.9 % — HIGH (ref 10.3–14.5)
SODIUM SERPL-SCNC: 132 MMOL/L — LOW (ref 135–145)
WBC # BLD: 5.02 K/UL — SIGNIFICANT CHANGE UP (ref 3.8–10.5)
WBC # FLD AUTO: 5.02 K/UL — SIGNIFICANT CHANGE UP (ref 3.8–10.5)

## 2024-12-09 PROCEDURE — 74183 MRI ABD W/O CNTR FLWD CNTR: CPT | Mod: 26

## 2024-12-09 PROCEDURE — 99232 SBSQ HOSP IP/OBS MODERATE 35: CPT

## 2024-12-09 PROCEDURE — 99233 SBSQ HOSP IP/OBS HIGH 50: CPT | Mod: GC

## 2024-12-09 RX ORDER — CLONIDINE HYDROCHLORIDE 0.3 MG/1
0.1 TABLET ORAL THREE TIMES A DAY
Refills: 0 | Status: DISCONTINUED | OUTPATIENT
Start: 2024-12-09 | End: 2024-12-18

## 2024-12-09 RX ORDER — INSULIN GLARGINE 100 [IU]/ML
24 INJECTION, SOLUTION SUBCUTANEOUS AT BEDTIME
Refills: 0 | Status: DISCONTINUED | OUTPATIENT
Start: 2024-12-09 | End: 2024-12-11

## 2024-12-09 RX ORDER — LORAZEPAM 2 MG/1
1 TABLET ORAL ONCE
Refills: 0 | Status: DISCONTINUED | OUTPATIENT
Start: 2024-12-09 | End: 2024-12-09

## 2024-12-09 RX ADMIN — ERTAPENEM 120 MILLIGRAM(S): 1 INJECTION, POWDER, LYOPHILIZED, FOR SOLUTION INTRAMUSCULAR; INTRAVENOUS at 05:40

## 2024-12-09 RX ADMIN — ESCITALOPRAM OXALATE 10 MILLIGRAM(S): 10 TABLET, FILM COATED ORAL at 16:31

## 2024-12-09 RX ADMIN — Medication 500 MILLIGRAM(S): at 17:06

## 2024-12-09 RX ADMIN — CARVEDILOL 12.5 MILLIGRAM(S): 25 TABLET, FILM COATED ORAL at 05:39

## 2024-12-09 RX ADMIN — Medication 5 UNIT(S): at 08:29

## 2024-12-09 RX ADMIN — Medication 5 UNIT(S): at 12:00

## 2024-12-09 RX ADMIN — Medication 300 MILLIGRAM(S): at 11:49

## 2024-12-09 RX ADMIN — AZATHIOPRINE 50 MILLIGRAM(S): 100 TABLET ORAL at 05:40

## 2024-12-09 RX ADMIN — CLONIDINE HYDROCHLORIDE 0.1 MILLIGRAM(S): 0.3 TABLET ORAL at 22:11

## 2024-12-09 RX ADMIN — HYDRALAZINE HYDROCHLORIDE 100 MILLIGRAM(S): 10 TABLET ORAL at 16:30

## 2024-12-09 RX ADMIN — Medication 325 MILLIGRAM(S): at 05:39

## 2024-12-09 RX ADMIN — PANTOPRAZOLE SODIUM 40 MILLIGRAM(S): 40 TABLET, DELAYED RELEASE ORAL at 05:39

## 2024-12-09 RX ADMIN — ACETAMINOPHEN 500MG 1000 MILLIGRAM(S): 500 TABLET, COATED ORAL at 00:00

## 2024-12-09 RX ADMIN — AZATHIOPRINE 50 MILLIGRAM(S): 100 TABLET ORAL at 17:07

## 2024-12-09 RX ADMIN — HYDRALAZINE HYDROCHLORIDE 100 MILLIGRAM(S): 10 TABLET ORAL at 05:39

## 2024-12-09 RX ADMIN — TACROLIMUS 2 MILLIGRAM(S): 5 CAPSULE ORAL at 11:48

## 2024-12-09 RX ADMIN — AMLODIPINE BESYLATE 10 MILLIGRAM(S): 10 TABLET ORAL at 05:40

## 2024-12-09 RX ADMIN — LISINOPRIL 40 MILLIGRAM(S): 20 TABLET ORAL at 05:39

## 2024-12-09 RX ADMIN — Medication 4: at 08:29

## 2024-12-09 RX ADMIN — Medication 5 UNIT(S): at 17:06

## 2024-12-09 RX ADMIN — Medication 88 MICROGRAM(S): at 05:39

## 2024-12-09 RX ADMIN — Medication 2: at 17:06

## 2024-12-09 RX ADMIN — Medication 4: at 12:00

## 2024-12-09 RX ADMIN — HYDRALAZINE HYDROCHLORIDE 100 MILLIGRAM(S): 10 TABLET ORAL at 22:11

## 2024-12-09 RX ADMIN — ROSUVASTATIN CALCIUM 10 MILLIGRAM(S): 5 TABLET, FILM COATED ORAL at 22:12

## 2024-12-09 RX ADMIN — CLONIDINE HYDROCHLORIDE 0.1 MILLIGRAM(S): 0.3 TABLET ORAL at 05:39

## 2024-12-09 RX ADMIN — POLYETHYLENE GLYCOL 3350 17 GRAM(S): 17 POWDER, FOR SOLUTION ORAL at 11:48

## 2024-12-09 RX ADMIN — Medication 500 MILLIGRAM(S): at 05:39

## 2024-12-09 RX ADMIN — PANTOPRAZOLE SODIUM 40 MILLIGRAM(S): 40 TABLET, DELAYED RELEASE ORAL at 17:07

## 2024-12-09 RX ADMIN — Medication 325 MILLIGRAM(S): at 17:07

## 2024-12-09 RX ADMIN — Medication 50 MILLIGRAM(S): at 16:31

## 2024-12-09 RX ADMIN — ESCITALOPRAM OXALATE 10 MILLIGRAM(S): 10 TABLET, FILM COATED ORAL at 22:11

## 2024-12-09 RX ADMIN — LORAZEPAM 1 MILLIGRAM(S): 2 TABLET ORAL at 12:46

## 2024-12-09 RX ADMIN — ESCITALOPRAM OXALATE 10 MILLIGRAM(S): 10 TABLET, FILM COATED ORAL at 05:40

## 2024-12-09 RX ADMIN — Medication 15 MILLILITER(S): at 11:49

## 2024-12-09 RX ADMIN — TACROLIMUS 1 MILLIGRAM(S): 5 CAPSULE ORAL at 22:11

## 2024-12-09 RX ADMIN — CLONIDINE HYDROCHLORIDE 0.1 MILLIGRAM(S): 0.3 TABLET ORAL at 16:31

## 2024-12-09 RX ADMIN — INSULIN GLARGINE 24 UNIT(S): 100 INJECTION, SOLUTION SUBCUTANEOUS at 21:30

## 2024-12-09 RX ADMIN — Medication 2 TABLET(S): at 22:11

## 2024-12-09 NOTE — PROGRESS NOTE ADULT - SUBJECTIVE AND OBJECTIVE BOX
[facility]                                                       Dr. Bruce, Dr. Prabhakar, Dr. Cabrales, Dr. Zaragoza, Dr. Bingham, Dr. Loya                                      [facility], [facility], [address]                                                 [address] [address]                                      Ph: [phone]  Fax: [phone]                                         Ph: [phone]  Fax: [phone]      Patient is a 69y old  Male who presents with a chief complaint of AMS (01 Dec 2024 11:19)  Patient seen in follow up for CKD, h/o Kidney transplant.        PAST MEDICAL HISTORY:  Diabetes Mellitus Type II    ESRD on Dialysis    GERD (gastroesophageal reflux disease)    Depression    Hypothyroidism    Hyperlipidemia    Kidney transplanted    Hypertension    ANGELIKA (obstructive sleep apnea)    Peripheral neuropathy    Shoulder fracture      MEDICATIONS  (STANDING):  amLODIPine   Tablet 10 milliGRAM(s) Oral daily  ascorbic acid 500 milliGRAM(s) Oral two times a day  azaTHIOprine 50 milliGRAM(s) Oral two times a day  buPROPion XL (24-Hour) . 300 milliGRAM(s) Oral daily  carvedilol 12.5 milliGRAM(s) Oral every 12 hours  cloNIDine 0.1 milliGRAM(s) Oral three times a day  dextrose 5%. 1000 milliLiter(s) (100 mL/Hr) IV Continuous <Continuous>  dextrose 5%. 1000 milliLiter(s) (50 mL/Hr) IV Continuous <Continuous>  dextrose 50% Injectable 25 Gram(s) IV Push once  dextrose 50% Injectable 12.5 Gram(s) IV Push once  dextrose 50% Injectable 25 Gram(s) IV Push once  ertapenem  IVPB      ertapenem  IVPB 1000 milliGRAM(s) IV Intermittent every 24 hours  escitalopram 10 milliGRAM(s) Oral <User Schedule>  ferrous    sulfate 325 milliGRAM(s) Oral two times a day  glucagon  Injectable 1 milliGRAM(s) IntraMuscular once  hydrALAZINE 100 milliGRAM(s) Oral three times a day  insulin glargine Injectable (LANTUS) 24 Unit(s) SubCutaneous at bedtime  insulin lispro (ADMELOG) corrective regimen sliding scale   SubCutaneous three times a day before meals  insulin lispro Injectable (ADMELOG) 5 Unit(s) SubCutaneous three times a day before meals  levothyroxine 88 MICROGram(s) Oral <User Schedule>  lisinopril 40 milliGRAM(s) Oral daily  mineral oil enema 133 milliLiter(s) Rectal once  multivitamin/minerals/iron Oral Solution (CENTRUM) 15 milliLiter(s) Oral daily  pantoprazole    Tablet 40 milliGRAM(s) Oral two times a day  polyethylene glycol 3350 17 Gram(s) Oral daily  pyridoxine 50 milliGRAM(s) Oral daily  rosuvastatin 10 milliGRAM(s) Oral at bedtime  senna 2 Tablet(s) Oral at bedtime  tacrolimus 1 milliGRAM(s) Oral at bedtime  tacrolimus 2 milliGRAM(s) Oral daily    MEDICATIONS  (PRN):  aluminum hydroxide/magnesium hydroxide/simethicone Suspension 30 milliLiter(s) Oral every 4 hours PRN Dyspepsia  dextrose Oral Gel 15 Gram(s) Oral once PRN Blood Glucose LESS THAN 70 milliGRAM(s)/deciliter  hydrALAZINE Injectable 10 milliGRAM(s) IV Push every 8 hours PRN SBP >180 and HR 60-70bpm  melatonin 3 milliGRAM(s) Oral at bedtime PRN Insomnia  metoprolol tartrate Injectable 5 milliGRAM(s) IV Push every 6 hours PRN SBP >170  ondansetron Injectable 4 milliGRAM(s) IV Push every 8 hours PRN Nausea and/or Vomiting    T(C): 37.4 (12-09-24 @ 11:28), Max: 37.4 (12-08-24 @ 21:22)  HR: 66 (12-09-24 @ 11:28) (62 - 70)  BP: 111/50 (12-09-24 @ 11:28) (111/50 - 183/66)  RR: 19 (12-09-24 @ 11:28)  SpO2: 97% (12-09-24 @ 11:28)  Wt(kg): --  I&O's Detail    08 Dec 2024 07:01  -  09 Dec 2024 07:00  --------------------------------------------------------  IN:    Oral Fluid: 960 mL  Total IN: 960 mL    OUT:  Total OUT: 0 mL    Total NET: 960 mL      09 Dec 2024 07:01  -  09 Dec 2024 14:06  --------------------------------------------------------  IN:  Total IN: 0 mL    OUT:    Voided (mL): 800 mL  Total OUT: 800 mL    Total NET: -800 mL                PHYSICAL EXAM:  General: No distress  Respiratory: b/l air entry  Cardiovascular: S1 S2  Gastrointestinal: soft  Extremities:  no edema          LABORATORY:                        7.0    5.02  )-----------( 332      ( 09 Dec 2024 10:40 )             21.3     12-09    132[L]  |  97  |  22  ----------------------------<  194[H]  4.2   |  29  |  0.89    Ca    10.7[H]      09 Dec 2024 10:40      Sodium: 132 mmol/L (12-09 @ 10:40)  Sodium: 132 mmol/L (12-08 @ 07:15)    Potassium: 4.2 mmol/L (12-09 @ 10:40)  Potassium: 4.5 mmol/L (12-08 @ 07:15)    Hemoglobin: 7.0 g/dL (12-09 @ 10:40)  Hemoglobin: 7.1 g/dL (12-08 @ 07:15)  Hemoglobin: 7.5 g/dL (12-07 @ 12:14)    Creatinine, Serum 0.89 (12-09 @ 10:40)  Creatinine, Serum 0.76 (12-08 @ 07:15)  Creatinine, Serum 0.79 (12-07 @ 12:14)          Urinalysis Basic - ( 09 Dec 2024 10:40 )    Color: x / Appearance: x / SG: x / pH: x  Gluc: 194 mg/dL / Ketone: x  / Bili: x / Urobili: x   Blood: x / Protein: x / Nitrite: x   Leuk Esterase: x / RBC: x / WBC x   Sq Epi: x / Non Sq Epi: x / Bacteria: x

## 2024-12-09 NOTE — PROGRESS NOTE ADULT - PROBLEM SELECTOR PROBLEM 3
Renal transplant recipient
Not applicable

## 2024-12-09 NOTE — PROGRESS NOTE ADULT - ASSESSMENT
CKD 3, h/o Kidney transplant ~2012, h/o Left Nephrectomy  Diabetes  Hypertension  Sepsis, UTI, Sacral osteomyelitis, Gram negative bacteremia  Hypokalemia  Hypercalcemia, Elevated 1,25 Vitamin D (ACEI level: WNL), R/o Lymphoma  Hypomagnesemia  Hypophosphatemia  Anemia  + Liver mass    Stable renal indices. To continue current immuno suppressants. For MRI. Calcium levels slowly trending down. .  Monitor blood sugar levels. Insulin coverage as needed. Dietary restriction. Will lower lisinopril dose if BP remains low. Trend BP and titrate BP meds as needed.   Trend BP and titrate BP meds as needed. Hematology follow up. Will follow electrolytes and renal function trend.

## 2024-12-09 NOTE — PROGRESS NOTE ADULT - TIME BILLING
Note written by attending. Patient seen and examined. Meds, labs, vitals, chart reviewed.   pt have hx  liver mass / spleen mass and retroperitoneal fibrosis  -    hem/onc dr corona will need  ir guided  after    mri abd  today  , repeat   liver biopsy   dc asa .

## 2024-12-09 NOTE — PROGRESS NOTE ADULT - SUBJECTIVE AND OBJECTIVE BOX
[facility]    Remi Sampson NP    [address] [place] [address]  [phone]      Chief Complaint:  Patient is a 69y old  Male who presents with a chief complaint of AMS       HPI/ 24 hr events:   Patient seen and examined at bedside  Confused, oriented to self only   Reports feeling well this morning   No acute GI complaints      REVIEW OF SYSTEMS:   General: Negative  HEENT: Negative  CV: Negative  Respiratory: Negative  GI: See HPI  : Negative  MSK: Negative  Hematologic: Negative  Skin: Negative    MEDICATIONS:   MEDICATIONS  (STANDING):  amLODIPine   Tablet 10 milliGRAM(s) Oral daily  ascorbic acid 500 milliGRAM(s) Oral two times a day  azaTHIOprine 50 milliGRAM(s) Oral two times a day  buPROPion XL (24-Hour) . 300 milliGRAM(s) Oral daily  carvedilol 12.5 milliGRAM(s) Oral every 12 hours  cloNIDine 0.1 milliGRAM(s) Oral three times a day  dextrose 5%. 1000 milliLiter(s) (50 mL/Hr) IV Continuous <Continuous>  dextrose 5%. 1000 milliLiter(s) (100 mL/Hr) IV Continuous <Continuous>  dextrose 50% Injectable 25 Gram(s) IV Push once  dextrose 50% Injectable 12.5 Gram(s) IV Push once  dextrose 50% Injectable 25 Gram(s) IV Push once  ertapenem  IVPB      ertapenem  IVPB 1000 milliGRAM(s) IV Intermittent every 24 hours  escitalopram 10 milliGRAM(s) Oral <User Schedule>  ferrous    sulfate 325 milliGRAM(s) Oral two times a day  glucagon  Injectable 1 milliGRAM(s) IntraMuscular once  hydrALAZINE 100 milliGRAM(s) Oral three times a day  insulin glargine Injectable (LANTUS) 24 Unit(s) SubCutaneous at bedtime  insulin lispro (ADMELOG) corrective regimen sliding scale   SubCutaneous three times a day before meals  insulin lispro Injectable (ADMELOG) 5 Unit(s) SubCutaneous three times a day before meals  levothyroxine 88 MICROGram(s) Oral <User Schedule>  lisinopril 40 milliGRAM(s) Oral daily  LORazepam   Injectable 1 milliGRAM(s) IV Push once  mineral oil enema 133 milliLiter(s) Rectal once  multivitamin/minerals/iron Oral Solution (CENTRUM) 15 milliLiter(s) Oral daily  pantoprazole    Tablet 40 milliGRAM(s) Oral two times a day  polyethylene glycol 3350 17 Gram(s) Oral daily  pyridoxine 50 milliGRAM(s) Oral daily  rosuvastatin 10 milliGRAM(s) Oral at bedtime  senna 2 Tablet(s) Oral at bedtime  tacrolimus 1 milliGRAM(s) Oral at bedtime  tacrolimus 2 milliGRAM(s) Oral daily    MEDICATIONS  (PRN):  aluminum hydroxide/magnesium hydroxide/simethicone Suspension 30 milliLiter(s) Oral every 4 hours PRN Dyspepsia  dextrose Oral Gel 15 Gram(s) Oral once PRN Blood Glucose LESS THAN 70 milliGRAM(s)/deciliter  hydrALAZINE Injectable 10 milliGRAM(s) IV Push every 8 hours PRN SBP >180 and HR 60-70bpm  melatonin 3 milliGRAM(s) Oral at bedtime PRN Insomnia  metoprolol tartrate Injectable 5 milliGRAM(s) IV Push every 6 hours PRN SBP >170  ondansetron Injectable 4 milliGRAM(s) IV Push every 8 hours PRN Nausea and/or Vomiting      ALLERGIES:   Allergies    No Known Allergies    Intolerances        VITAL SIGNS:   Vital Signs Last 24 Hrs  T(C): 36.8 (09 Dec 2024 04:58), Max: 37.4 (08 Dec 2024 21:22)  T(F): 98.2 (09 Dec 2024 04:58), Max: 99.4 (08 Dec 2024 21:22)  HR: 62 (09 Dec 2024 04:58) (62 - 66)  BP: 157/66 (09 Dec 2024 04:58) (149/73 - 157/66)  BP(mean): --  RR: 18 (09 Dec 2024 04:58) (18 - 19)  SpO2: 98% (09 Dec 2024 04:58) (95% - 98%)    Parameters below as of 09 Dec 2024 04:58  Patient On (Oxygen Delivery Method): room air      I&O's Summary    08 Dec 2024 07:01  -  09 Dec 2024 07:00  --------------------------------------------------------  IN: 960 mL / OUT: 0 mL / NET: 960 mL        PHYSICAL EXAM:   GENERAL:  No acute distress  HEENT:  NC/AT  CHEST:  No increased effort  HEART:  Regular rate  ABDOMEN:  Soft, non-tender, non-distended  EXTREMITIES: No edema, no cyanosis  SKIN:  Warm, dry  NEURO:  Calm, cooperative, confused     LABS:                        7.0    5.02  )-----------( 332      ( 09 Dec 2024 10:40 )             21.3     12-09    132[L]  |  97  |  22  ----------------------------<  194[H]  4.2   |  29  |  0.89    Ca    10.7[H]      09 Dec 2024 10:40                                          RADIOLOGY & ADDITIONAL STUDIES:

## 2024-12-09 NOTE — PROGRESS NOTE ADULT - SUBJECTIVE AND OBJECTIVE BOX
Patient is a 69y old  Male who presents with a chief complaint of AMS (09 Dec 2024 10:19)    pt seen and examine  awake  alert  denies acute c/o no pain , no fever . tolerating po.  INTERVAL HPI/OVERNIGHT EVENTS:     T(C): 36.8 (12-09-24 @ 04:58), Max: 37.4 (12-08-24 @ 21:22)  HR: 62 (12-09-24 @ 04:58) (62 - 66)  BP: 157/66 (12-09-24 @ 04:58) (149/73 - 157/66)  RR: 18 (12-09-24 @ 04:58) (18 - 19)  SpO2: 98% (12-09-24 @ 04:58) (95% - 98%)  Wt(kg): --  I&O's Summary    08 Dec 2024 07:01  -  09 Dec 2024 07:00  --------------------------------------------------------  IN: 960 mL / OUT: 0 mL / NET: 960 mL    ---------------------------------  IN: 0 mL / OUT: 2850 mL / NET: -2850 m          REVIEW OF SYSTEMS:  CONSTITUTIONAL: No fever, weight loss, no  fatigue  EYES: No eye pain, visual disturbances, or discharge  ENMT:  No difficulty hearing, tinnitus, vertigo; No sinus or throat pain  NECK: No pain or stiffness  RESPIRATORY: No cough, wheezing, chills ; No shortness of breath  CARDIOVASCULAR: No chest pain, palpitations, dizziness, or leg swelling  GASTROINTESTINAL: No abdominal or epigastric pain. No nausea, vomiting,  No diarrhea   no constipation   GENITOURINARY: No dysuria, frequency, hematuria, or incontinence  NEUROLOGICAL: No headaches, memory loss, loss of strength, numbness, or tremors  SKIN: No itching, burning, rashes, or lesions   MUSCULOSKELETAL: No joint pain or swelling; No muscle, back, or extremity pain    PHYSICAL EXAM:  GENERAL: NAD,  weak   HEAD:  Atraumatic, Normocephalic  EYES: EOMI, PERRLA, conjunctiva and sclera clear  ENMT  Moist mucous membranes  NECK: Supple, No JVD  NERVOUS SYSTEM:  Alert & Oriented X3; Motor Strength 5/5 B/L upper and lower extremities; DTRs 2+ intact and symmetric  CHEST/LUNG:   percussion bilaterally; No rales, rhonchi, wheezing,   HEART: Regular rate and rhythm; No murmurs,   ABDOMEN: Soft, Nontender, Nondistended; Bowel sounds present  EXTREMITIES:  2+ Peripheral Pulses, No clubbing, cyanosis, or edema  SKIN: No rashes or lesions / picc line intact         MEDICATIONS  (STANDING):  amLODIPine   Tablet 10 milliGRAM(s) Oral daily  ascorbic acid 500 milliGRAM(s) Oral two times a day  azaTHIOprine 50 milliGRAM(s) Oral two times a day  buPROPion XL (24-Hour) . 300 milliGRAM(s) Oral daily  carvedilol 12.5 milliGRAM(s) Oral every 12 hours  cloNIDine 0.1 milliGRAM(s) Oral every 12 hours  dextrose 5%. 1000 milliLiter(s) (50 mL/Hr) IV Continuous <Continuous>  dextrose 5%. 1000 milliLiter(s) (100 mL/Hr) IV Continuous <Continuous>  dextrose 50% Injectable 12.5 Gram(s) IV Push once  dextrose 50% Injectable 25 Gram(s) IV Push once  dextrose 50% Injectable 25 Gram(s) IV Push once  ertapenem  IVPB      ertapenem  IVPB 1000 milliGRAM(s) IV Intermittent every 24 hours  escitalopram 10 milliGRAM(s) Oral <User Schedule>  ferrous    sulfate 325 milliGRAM(s) Oral two times a day  glucagon  Injectable 1 milliGRAM(s) IntraMuscular once  hydrALAZINE 100 milliGRAM(s) Oral three times a day  insulin glargine Injectable (LANTUS) 20 Unit(s) SubCutaneous at bedtime  insulin lispro (ADMELOG) corrective regimen sliding scale   SubCutaneous three times a day before meals  insulin lispro Injectable (ADMELOG) 5 Unit(s) SubCutaneous three times a day before meals  levothyroxine 88 MICROGram(s) Oral <User Schedule>  lisinopril 40 milliGRAM(s) Oral daily  mineral oil enema 133 milliLiter(s) Rectal once  multivitamin/minerals/iron Oral Solution (CENTRUM) 15 milliLiter(s) Oral daily  pantoprazole    Tablet 40 milliGRAM(s) Oral two times a day  polyethylene glycol 3350 17 Gram(s) Oral daily  pyridoxine 50 milliGRAM(s) Oral daily  rosuvastatin 10 milliGRAM(s) Oral at bedtime  senna 2 Tablet(s) Oral at bedtime  tacrolimus 1 milliGRAM(s) Oral at bedtime  tacrolimus 2 milliGRAM(s) Oral daily    MEDICATIONS  (PRN):  aluminum hydroxide/magnesium hydroxide/simethicone Suspension 30 milliLiter(s) Oral every 4 hours PRN Dyspepsia  dextrose Oral Gel 15 Gram(s) Oral once PRN Blood Glucose LESS THAN 70 milliGRAM(s)/deciliter  hydrALAZINE Injectable 10 milliGRAM(s) IV Push every 8 hours PRN SBP >180 and HR 60-70bpm  melatonin 3 milliGRAM(s) Oral at bedtime PRN Insomnia  metoprolol tartrate Injectable 5 milliGRAM(s) IV Push every 6 hours PRN SBP >170  ondansetron Injectable 4 milliGRAM(s) IV Push every 8 hours PRN Nausea and/or Vomiting      LABS:                        7.1    4.57  )-----------( 326      ( 08 Dec 2024 07:15 )             21.8     12-09    132[L]  |  97  |  22  ----------------------------<  194[H]  4.2   |  29  |  0.89    Ca    10.7[H]      09 Dec 2024 10:40        Urinalysis Basic - ( 09 Dec 2024 10:40 )    Color: x / Appearance: x / SG: x / pH: x  Gluc: 194 mg/dL / Ketone: x  / Bili: x / Urobili: x   Blood: x / Protein: x / Nitrite: x   Leuk Esterase: x / RBC: x / WBC x   Sq Epi: x / Non Sq Epi: x / Bacteria: x      CAPILLARY BLOOD GLUCOSE      POCT Blood Glucose.: 213 mg/dL (09 Dec 2024 08:21)  POCT Blood Glucose.: 249 mg/dL (08 Dec 2024 21:59)  POCT Blood Glucose.: 326 mg/dL (08 Dec 2024 16:46)  POCT Blood Glucose.: 262 mg/dL (08 Dec 2024 11:32)              RADIOLOGY & ADDITIONAL TESTS:    Imaging Personally Reviewed:   no new test     Advance Directives:    full code  Palliative Care: appropriate   Appropriate

## 2024-12-09 NOTE — PROGRESS NOTE ADULT - SUBJECTIVE AND OBJECTIVE BOX
[facility] -- Tom Rahman Pannella, Patel, Savella, Goodger, Cohen: Office # [phone]    Follow Up: Uncontrolled HTN    Subjective/Observations: Patient seen and examined. Patient awake, alert, resting in bed. confused, Unable to obtain meaningful information 2/2 confusion. Tolerating room air. Medicated overnight for pain     REVIEW OF SYSTEMS: All other review of systems are negative unless indicated above    PAST MEDICAL & SURGICAL HISTORY:  Diabetes Mellitus Type II  Insulin pump (2010)      GERD (gastroesophageal reflux disease)      Depression      Hypothyroidism      Hypothyroidism      Hyperlipidemia      Kidney transplanted  2012      Hypertension      Hypertension      ANGELIKA (obstructive sleep apnea)      Peripheral neuropathy      Shoulder fracture  Left.      History of colonoscopy      S/P kidney transplant  2012      S/P cholecystectomy      Retroperitoneal fibrosis  s/p surgery      AV fistula  Right arm      S/P right cataract extraction      S/P left cataract extraction      H/O unilateral nephrectomy  Left    MEDICATIONS  (STANDING):  amLODIPine   Tablet 10 milliGRAM(s) Oral daily  ascorbic acid 500 milliGRAM(s) Oral two times a day  azaTHIOprine 50 milliGRAM(s) Oral two times a day  buPROPion XL (24-Hour) . 300 milliGRAM(s) Oral daily  carvedilol 12.5 milliGRAM(s) Oral every 12 hours  cloNIDine 0.1 milliGRAM(s) Oral every 12 hours  dextrose 5%. 1000 milliLiter(s) (50 mL/Hr) IV Continuous <Continuous>  dextrose 5%. 1000 milliLiter(s) (100 mL/Hr) IV Continuous <Continuous>  dextrose 50% Injectable 25 Gram(s) IV Push once  dextrose 50% Injectable 25 Gram(s) IV Push once  dextrose 50% Injectable 12.5 Gram(s) IV Push once  ertapenem  IVPB      ertapenem  IVPB 1000 milliGRAM(s) IV Intermittent every 24 hours  escitalopram 10 milliGRAM(s) Oral <User Schedule>  ferrous    sulfate 325 milliGRAM(s) Oral two times a day  glucagon  Injectable 1 milliGRAM(s) IntraMuscular once  hydrALAZINE 100 milliGRAM(s) Oral three times a day  insulin glargine Injectable (LANTUS) 20 Unit(s) SubCutaneous at bedtime  insulin lispro (ADMELOG) corrective regimen sliding scale   SubCutaneous three times a day before meals  insulin lispro Injectable (ADMELOG) 5 Unit(s) SubCutaneous three times a day before meals  levothyroxine 88 MICROGram(s) Oral <User Schedule>  lisinopril 40 milliGRAM(s) Oral daily  mineral oil enema 133 milliLiter(s) Rectal once  multivitamin/minerals/iron Oral Solution (CENTRUM) 15 milliLiter(s) Oral daily  pantoprazole    Tablet 40 milliGRAM(s) Oral two times a day  polyethylene glycol 3350 17 Gram(s) Oral daily  pyridoxine 50 milliGRAM(s) Oral daily  rosuvastatin 10 milliGRAM(s) Oral at bedtime  senna 2 Tablet(s) Oral at bedtime  tacrolimus 1 milliGRAM(s) Oral at bedtime  tacrolimus 2 milliGRAM(s) Oral daily    MEDICATIONS  (PRN):  aluminum hydroxide/magnesium hydroxide/simethicone Suspension 30 milliLiter(s) Oral every 4 hours PRN Dyspepsia  dextrose Oral Gel 15 Gram(s) Oral once PRN Blood Glucose LESS THAN 70 milliGRAM(s)/deciliter  hydrALAZINE Injectable 10 milliGRAM(s) IV Push every 8 hours PRN SBP >180 and HR 60-70bpm  melatonin 3 milliGRAM(s) Oral at bedtime PRN Insomnia  metoprolol tartrate Injectable 5 milliGRAM(s) IV Push every 6 hours PRN SBP >170  ondansetron Injectable 4 milliGRAM(s) IV Push every 8 hours PRN Nausea and/or Vomiting    Allergies  No Known Allergies    Vital Signs Last 24 Hrs  T(C): 36.8 (09 Dec 2024 04:58), Max: 37.4 (08 Dec 2024 21:22)  T(F): 98.2 (09 Dec 2024 04:58), Max: 99.4 (08 Dec 2024 21:22)  HR: 62 (09 Dec 2024 04:58) (62 - 66)  BP: 157/66 (09 Dec 2024 04:58) (135/74 - 157/66)  BP(mean): --  RR: 18 (09 Dec 2024 04:58) (18 - 19)  SpO2: 98% (09 Dec 2024 04:58) (95% - 98%)    Parameters below as of 09 Dec 2024 04:58  Patient On (Oxygen Delivery Method): room air      I&O's Summary    08 Dec 2024 07:01  -  09 Dec 2024 07:00  --------------------------------------------------------  IN: 960 mL / OUT: 0 mL / NET: 960 mL          TELE: Not on telemetry   PHYSICAL EXAM:  Constitutional: NAD, awake and alert  HEENT: Moist Mucous Membranes, Anicteric  Pulmonary: Non-labored, breath sounds are clear bilaterally, No wheezing, rales or rhonchi  Cardiovascular: Regular, S1 and S2, No murmurs, No rubs, gallops or clicks  Gastrointestinal:  soft, nontender, nondistended   Lymph: No peripheral edema. No lymphadenopathy.   Skin: No visible rashes or ulcers.  Psych: confused       LABS: All Labs Reviewed:                        7.1    4.57  )-----------( 326      ( 08 Dec 2024 07:15 )             21.8                         7.5    4.46  )-----------( 365      ( 07 Dec 2024 12:14 )             23.6     08 Dec 2024 07:15    132    |  100    |  19     ----------------------------<  263    4.5     |  27     |  0.76   07 Dec 2024 12:14    136    |  101    |  21     ----------------------------<  195    4.2     |  31     |  0.79     Ca    10.6       08 Dec 2024 07:15  Ca    11.0       07 Dec 2024 12:14         12 Lead ECG:   Ventricular Rate 81 BPM    Atrial Rate 81 BPM    P-R Interval 148 ms    QRS Duration 104 ms    Q-T Interval 406 ms    QTC Calculation(Bazett) 471 ms    P Axis 44 degrees    R Axis 12 degrees    T Axis 53 degrees    Diagnosis Line Normal sinus rhythm  Normal ECG  When compared with ECG of 29-NOV-2024 07:11,  Nonspecific T wave abnormality, improved in Lateral leads  Confirmed by Kya Gonzalez ([id number]) on 12/1/2024 10:43:18 AM (12-01-24 @ 09:28)      TRANSTHORACIC ECHOCARDIOGRAM REPORT  ________________________________________________________________________________                                      _______       Pt. Name:       JAN CALVIN Study Date:    11/29/2024  MRN:            [id number]          YOB: 1955  Accession #:    [id number]         Age:           69 years  Account#:       [id number]        Gender:        M  Heart Rate:                       Height:        64.17 in (163.00 cm)  Rhythm:       Weight:        169.75 lb (77.00 kg)  Blood Pressure: 196/81 mmHg       BSA/BMI:       1.83 m² / 28.98 kg/m²  ________________________________________________________________________________________  Referring Physician:    [id number] Ale Ibrahim  Interpreting Physician: Kya Gonzalez MD  Primary Sonographer:    Butch Salazar    CPT:               ECHO TTE WO CON COMP W DOPP - 41774.m  Indication(s):     Cardiac murmur, unspecified - R01.1  Procedure:         Transthoracic echocardiogram with 2-D, M-mode and complete                     spectral and color flow Doppler.  Ordering Location: [facility]  Admission Status:  ED    _______________________________________________________________________________________     CONCLUSIONS:      1. Left ventricular cavity is normal in size. Left ventricular systolic function is normal with an ejection fraction of 60 % by Moreno's method of disks.   2. Trace pericardial effusion noted adjacent to the posterior left ventricle.   3. Normal right ventricular cavity size and normal right ventricular systolic function.   4. Aortic valve anatomy cannot be determined with normal systolic excursion. There is calcification of the aortic valve leaflets.   5. Structurally normal mitral valve with normalleaflet excursion.   6. Structurally normal tricuspid valve with normal leaflet excursion. Trace tricuspid regurgitation.   7. The inferior vena cava is normal in size measuring 1.36 cm in diameter, (normal <2.1cm) with normal inspiratory collapse (normal >50%) consistent with normal right atrial pressure (~3, range 0-5mmHg).    ________________________________________________________________________________________  FINDINGS:     Left Ventricle:  The left ventricular cavity is normal in size. Left ventricular systolic function is normal with a calculated ejection fraction of 60 % by the Moreno's biplane method of disks.     Right Ventricle:  The right ventricular cavity is normal in size and right ventricular systolic function is normal. Tricuspid annular plane systolic excursion (TAPSE) is 2.9 cm (normal >=1.7 cm).     Left Atrium:  The left atrium is normal in size with an indexed volume of 33.15 ml/m².     Right Atrium:  The right atrium is normal in size with an indexed volume of 21.17 ml/m².     Interatrial Septum:  The interatrial septum appears intact.     Aortic Valve:  The aortic valve anatomy cannot be determined with normal systolic excursion. There is calcification of the aortic valve leaflets. The peak transaortic velocity is 2.15 m/s, peak transaortic gradient is 18.5 mmHg and mean transaortic gradient is 11.0 mmHg with an LVOT/aortic valve VTI ratio of 0.64. The aortic valve area is estimated at 2.67 cm² by the continuity equation. There is no evidence of aortic regurgitation.     Mitral Valve:  Structurally normal mitral valve with normal leaflet excursion. There is calcification of the mitral valve annulus.     Tricuspid Valve:  Structurally normal tricuspid valve with normal leaflet excursion. There is trace tricuspid regurgitation. Estimated pulmonary artery systolic pressure is 8 mmHg.     Aorta:  The aortic root at the sinuses of Valsalva is normal in size, measuring 3.50 cm (indexed 1.91 cm/m²). The ascending aorta is dilated, measuring 4.10 cm (indexed 2.24 cm/m²).     Pericardium:  There is a trace pericardial effusion noted adjacent to the posterior left ventricle.     Systemic Veins:  The inferior vena cava is normal in size measuring 1.36 cm in diameter, (normal <2.1cm) with normal inspiratory collapse (normal >50%) consistent with normal right atrial pressure (~3, range 0-5mmHg).  ____________________________________________________________________  QUANTITATIVE DATA:  Left Ventricle Measurements: (Indexed to BSA)     IVSd (2D):   1.0 cm  LVPWd (2D):  1.0 cm  LVIDd (2D):  5.3 cm  LVIDs (2D):  3.6 cm  LV Mass:     203 g  111.2 g/m²  LV Vol d, MOD A2C: 97.8 ml  53.50 ml/m²  LV Vol d, MOD A4C: 121.0 ml 66.19 ml/m²  LV Vol d, MOD BP:  109.5 ml 59.90 ml/m²  LV Vol s, MOD A2C: 36.4 ml  19.91 ml/m²  LV Vol s, MOD A4C: 49.5 ml  27.08 ml/m²  LV Vol s, MOD BP:  44.0 ml  24.04 ml/m²  LVOT SV MOD BP:    65.5 ml  LV EF% MOD BP:     60 %     MV E Vmax:    1.02 m/s  MV A Vmax:    0.93 m/s  MV E/A:       1.10  e' lateral:   13.10 cm/s  e' medial:    9.25 cm/s  E/e' lateral: 7.79  E/e' medial:  11.03  E/e' Average: 9.13  MV DT:        151 msec    Aorta Measurements: (Normal range) (Indexed to BSA)     Ao Root d     3.50 cm (3.1 - 3.7 cm) 1.91 cm/m²  Ao Asc d, 2D: 4.10  Ao Asc prox:  4.10 cm               2.24 cm/m²            Left Atrium Measurements: (Indexed to BSA)  LA Diam 2D: 4.40 cm         Right Ventricle Measurements: Right Atrial Measurements:     TAPSE:            2.9 cm      RA Vol:            38.70 ml  RV Base (RVID1):  3.7cm      RA Vol s, MOD A4C  38.7 ml  RV Mid (RVID2):   3.1 cm      RA Vol Index:      21.17 ml/m²  RV Major (RVID3): 8.0 cm      RA Area s, MOD A4C 14.7 cm²       LVOT / RVOT/ Qp/Qs Data: (Indexed to BSA)  LVOT Diameter:  2.30 cm  LVOT Area:      4.15cm²  LVOT Vmax:      1.34 m/s  LVOT Vmn:       0.949 m/s  LVOT VTI:       26.30 cm  LVOT peak grad: 7 mmHg  LVOT mean grad: 4.0 mmHg  LVOT SV:        109.3 ml  59.78 ml/m²    Aortic Valve Measurements:  AV Vmax:                2.2 m/s  AV Peak Gradient:       18.5 mmHg  AV Mean Gradient:       11.0 mmHg  AV VTI:                 41.0 cm  AV VTI Ratio:           0.64  AoV EOA, Contin:        2.67 cm²  AoV EOA, Contin i:      1.46 cm²/m²  AoV Dimensionless Index 0.64    Mitral Valve Measurements:     MV E Vmax: 1.0 m/s  MV A Vmax: 0.9 m/s  MV E/A:    1.1       Tricuspid Valve Measurements:     TR Vmax:          1.2 m/s  TR Peak Gradient: 5.3 mmHg  RA Pressure:      3 mmHg  PASP:             8 mmHg    ________________________________________________________________________________________  Electronically signed on 11/30/2024 at 1:57:15 PM by Kya Gonzalez MD         *** Final ***   [facility] -- Tom Rahman Pannella, Patel, Savella, Goodger, Cohen: Office # [phone]    Follow Up: Uncontrolled HTN    Subjective/Observations: Patient seen and examined. Patient awake, alert, resting in bed, confused, sleeping, arousable. Unable to obtain meaningful information 2/2 confusion. Tolerating room air. Medicated overnight for pain     REVIEW OF SYSTEMS: All other review of systems are negative unless indicated above    PAST MEDICAL & SURGICAL HISTORY:  Diabetes Mellitus Type II  Insulin pump (2010)      GERD (gastroesophageal reflux disease)      Depression      Hypothyroidism      Hypothyroidism      Hyperlipidemia      Kidney transplanted  2012      Hypertension      Hypertension      ANGELIKA (obstructive sleep apnea)      Peripheral neuropathy      Shoulder fracture  Left.      History of colonoscopy      S/P kidney transplant  2012      S/P cholecystectomy      Retroperitoneal fibrosis  s/p surgery      AV fistula  Right arm      S/P right cataract extraction      S/P left cataract extraction      H/O unilateral nephrectomy  Left    MEDICATIONS  (STANDING):  amLODIPine   Tablet 10 milliGRAM(s) Oral daily  ascorbic acid 500 milliGRAM(s) Oral two times a day  azaTHIOprine 50 milliGRAM(s) Oral two times a day  buPROPion XL (24-Hour) . 300 milliGRAM(s) Oral daily  carvedilol 12.5 milliGRAM(s) Oral every 12 hours  cloNIDine 0.1 milliGRAM(s) Oral every 12 hours  dextrose 5%. 1000 milliLiter(s) (50 mL/Hr) IV Continuous <Continuous>  dextrose 5%. 1000 milliLiter(s) (100 mL/Hr) IV Continuous <Continuous>  dextrose 50% Injectable 25 Gram(s) IV Push once  dextrose 50% Injectable 25 Gram(s) IV Push once  dextrose 50% Injectable 12.5 Gram(s) IV Push once  ertapenem  IVPB      ertapenem  IVPB 1000 milliGRAM(s) IV Intermittent every 24 hours  escitalopram 10 milliGRAM(s) Oral <User Schedule>  ferrous    sulfate 325 milliGRAM(s) Oral two times a day  glucagon  Injectable 1 milliGRAM(s) IntraMuscular once  hydrALAZINE 100 milliGRAM(s) Oral three times a day  insulin glargine Injectable (LANTUS) 20 Unit(s) SubCutaneous at bedtime  insulin lispro (ADMELOG) corrective regimen sliding scale   SubCutaneous three times a day before meals  insulin lispro Injectable (ADMELOG) 5 Unit(s) SubCutaneous three times a day before meals  levothyroxine 88 MICROGram(s) Oral <User Schedule>  lisinopril 40 milliGRAM(s) Oral daily  mineral oil enema 133 milliLiter(s) Rectal once  multivitamin/minerals/iron Oral Solution (CENTRUM) 15 milliLiter(s) Oral daily  pantoprazole    Tablet 40 milliGRAM(s) Oral two times a day  polyethylene glycol 3350 17 Gram(s) Oral daily  pyridoxine 50 milliGRAM(s) Oral daily  rosuvastatin 10 milliGRAM(s) Oral at bedtime  senna 2 Tablet(s) Oral at bedtime  tacrolimus 1 milliGRAM(s) Oral at bedtime  tacrolimus 2 milliGRAM(s) Oral daily    MEDICATIONS  (PRN):  aluminum hydroxide/magnesium hydroxide/simethicone Suspension 30 milliLiter(s) Oral every 4 hours PRN Dyspepsia  dextrose Oral Gel 15 Gram(s) Oral once PRN Blood Glucose LESS THAN 70 milliGRAM(s)/deciliter  hydrALAZINE Injectable 10 milliGRAM(s) IV Push every 8 hours PRN SBP >180 and HR 60-70bpm  melatonin 3 milliGRAM(s) Oral at bedtime PRN Insomnia  metoprolol tartrate Injectable 5 milliGRAM(s) IV Push every 6 hours PRN SBP >170  ondansetron Injectable 4 milliGRAM(s) IV Push every 8 hours PRN Nausea and/or Vomiting    Allergies  No Known Allergies    Vital Signs Last 24 Hrs  T(C): 36.8 (09 Dec 2024 04:58), Max: 37.4 (08 Dec 2024 21:22)  T(F): 98.2 (09 Dec 2024 04:58), Max: 99.4 (08 Dec 2024 21:22)  HR: 62 (09 Dec 2024 04:58) (62 - 66)  BP: 157/66 (09 Dec 2024 04:58) (135/74 - 157/66)  BP(mean): --  RR: 18 (09 Dec 2024 04:58) (18 - 19)  SpO2: 98% (09 Dec 2024 04:58) (95% - 98%)    Parameters below as of 09 Dec 2024 04:58  Patient On (Oxygen Delivery Method): room air      I&O's Summary    08 Dec 2024 07:01  -  09 Dec 2024 07:00  --------------------------------------------------------  IN: 960 mL / OUT: 0 mL / NET: 960 mL          TELE: Not on telemetry   PHYSICAL EXAM:  Constitutional: NAD, awake and alert  HEENT: Moist Mucous Membranes, Anicteric  Pulmonary: Non-labored, breath sounds are clear bilaterally, No wheezing, rales or rhonchi  Cardiovascular: Regular, S1 and S2, No murmurs, No rubs, gallops or clicks  Gastrointestinal:  soft, nontender, nondistended   Lymph: No peripheral edema. No lymphadenopathy.   Skin: No visible rashes or ulcers.  Psych: confused       LABS: All Labs Reviewed:                        7.1    4.57  )-----------( 326      ( 08 Dec 2024 07:15 )             21.8                         7.5    4.46  )-----------( 365      ( 07 Dec 2024 12:14 )             23.6     08 Dec 2024 07:15    132    |  100    |  19     ----------------------------<  263    4.5     |  27     |  0.76   07 Dec 2024 12:14    136    |  101    |  21     ----------------------------<  195    4.2     |  31     |  0.79     Ca    10.6       08 Dec 2024 07:15  Ca    11.0       07 Dec 2024 12:14         12 Lead ECG:   Ventricular Rate 81 BPM    Atrial Rate 81 BPM    P-R Interval 148 ms    QRS Duration 104 ms    Q-T Interval 406 ms    QTC Calculation(Bazett) 471 ms    P Axis 44 degrees    R Axis 12 degrees    T Axis 53 degrees    Diagnosis Line Normal sinus rhythm  Normal ECG  When compared with ECG of 29-NOV-2024 07:11,  Nonspecific T wave abnormality, improved in Lateral leads  Confirmed by Kya Gonzalez ([id number]) on 12/1/2024 10:43:18 AM (12-01-24 @ 09:28)      TRANSTHORACIC ECHOCARDIOGRAM REPORT  ________________________________________________________________________________                                      _______       Pt. Name:       JAN CALVIN Study Date:    11/29/2024  MRN:            [id number]          YOB: 1955  Accession #:    [id number]         Age:           69 years  Account#:       [id number]        Gender:        M  Heart Rate:                       Height:        64.17 in (163.00 cm)  Rhythm:       Weight:        169.75 lb (77.00 kg)  Blood Pressure: 196/81 mmHg       BSA/BMI:       1.83 m² / 28.98 kg/m²  ________________________________________________________________________________________  Referring Physician:    [id number] Ale Ibrahim  Interpreting Physician: Kya Gonzalez MD  Primary Sonographer:    Butch Salazar    CPT:               ECHO TTE WO CON COMP W DOPP - 02241.m  Indication(s):     Cardiac murmur, unspecified - R01.1  Procedure:         Transthoracic echocardiogram with 2-D, M-mode and complete                     spectral and color flow Doppler.  Ordering Location: [facility]  Admission Status:  ED    _______________________________________________________________________________________     CONCLUSIONS:      1. Left ventricular cavity is normal in size. Left ventricular systolic function is normal with an ejection fraction of 60 % by Moreno's method of disks.   2. Trace pericardial effusion noted adjacent to the posterior left ventricle.   3. Normal right ventricular cavity size and normal right ventricular systolic function.   4. Aortic valve anatomy cannot be determined with normal systolic excursion. There is calcification of the aortic valve leaflets.   5. Structurally normal mitral valve with normalleaflet excursion.   6. Structurally normal tricuspid valve with normal leaflet excursion. Trace tricuspid regurgitation.   7. The inferior vena cava is normal in size measuring 1.36 cm in diameter, (normal <2.1cm) with normal inspiratory collapse (normal >50%) consistent with normal right atrial pressure (~3, range 0-5mmHg).    ________________________________________________________________________________________  FINDINGS:     Left Ventricle:  The left ventricular cavity is normal in size. Left ventricular systolic function is normal with a calculated ejection fraction of 60 % by the Moreno's biplane method of disks.     Right Ventricle:  The right ventricular cavity is normal in size and right ventricular systolic function is normal. Tricuspid annular plane systolic excursion (TAPSE) is 2.9 cm (normal >=1.7 cm).     Left Atrium:  The left atrium is normal in size with an indexed volume of 33.15 ml/m².     Right Atrium:  The right atrium is normal in size with an indexed volume of 21.17 ml/m².     Interatrial Septum:  The interatrial septum appears intact.     Aortic Valve:  The aortic valve anatomy cannot be determined with normal systolic excursion. There is calcification of the aortic valve leaflets. The peak transaortic velocity is 2.15 m/s, peak transaortic gradient is 18.5 mmHg and mean transaortic gradient is 11.0 mmHg with an LVOT/aortic valve VTI ratio of 0.64. The aortic valve area is estimated at 2.67 cm² by the continuity equation. There is no evidence of aortic regurgitation.     Mitral Valve:  Structurally normal mitral valve with normal leaflet excursion. There is calcification of the mitral valve annulus.     Tricuspid Valve:  Structurally normal tricuspid valve with normal leaflet excursion. There is trace tricuspid regurgitation. Estimated pulmonary artery systolic pressure is 8 mmHg.     Aorta:  The aortic root at the sinuses of Valsalva is normal in size, measuring 3.50 cm (indexed 1.91 cm/m²). The ascending aorta is dilated, measuring 4.10 cm (indexed 2.24 cm/m²).     Pericardium:  There is a trace pericardial effusion noted adjacent to the posterior left ventricle.     Systemic Veins:  The inferior vena cava is normal in size measuring 1.36 cm in diameter, (normal <2.1cm) with normal inspiratory collapse (normal >50%) consistent with normal right atrial pressure (~3, range 0-5mmHg).  ____________________________________________________________________  QUANTITATIVE DATA:  Left Ventricle Measurements: (Indexed to BSA)     IVSd (2D):   1.0 cm  LVPWd (2D):  1.0 cm  LVIDd (2D):  5.3 cm  LVIDs (2D):  3.6 cm  LV Mass:     203 g  111.2 g/m²  LV Vol d, MOD A2C: 97.8 ml  53.50 ml/m²  LV Vol d, MOD A4C: 121.0 ml 66.19 ml/m²  LV Vol d, MOD BP:  109.5 ml 59.90 ml/m²  LV Vol s, MOD A2C: 36.4 ml  19.91 ml/m²  LV Vol s, MOD A4C: 49.5 ml  27.08 ml/m²  LV Vol s, MOD BP:  44.0 ml  24.04 ml/m²  LVOT SV MOD BP:    65.5 ml  LV EF% MOD BP:     60 %     MV E Vmax:    1.02 m/s  MV A Vmax:    0.93 m/s  MV E/A:       1.10  e' lateral:   13.10 cm/s  e' medial:    9.25 cm/s  E/e' lateral: 7.79  E/e' medial:  11.03  E/e' Average: 9.13  MV DT:        151 msec    Aorta Measurements: (Normal range) (Indexed to BSA)     Ao Root d     3.50 cm (3.1 - 3.7 cm) 1.91 cm/m²  Ao Asc d, 2D: 4.10  Ao Asc prox:  4.10 cm               2.24 cm/m²            Left Atrium Measurements: (Indexed to BSA)  LA Diam 2D: 4.40 cm         Right Ventricle Measurements: Right Atrial Measurements:     TAPSE:            2.9 cm      RA Vol:            38.70 ml  RV Base (RVID1):  3.7cm      RA Vol s, MOD A4C  38.7 ml  RV Mid (RVID2):   3.1 cm      RA Vol Index:      21.17 ml/m²  RV Major (RVID3): 8.0 cm      RA Area s, MOD A4C 14.7 cm²       LVOT / RVOT/ Qp/Qs Data: (Indexed to BSA)  LVOT Diameter:  2.30 cm  LVOT Area:      4.15cm²  LVOT Vmax:      1.34 m/s  LVOT Vmn:       0.949 m/s  LVOT VTI:       26.30 cm  LVOT peak grad: 7 mmHg  LVOT mean grad: 4.0 mmHg  LVOT SV:        109.3 ml  59.78 ml/m²    Aortic Valve Measurements:  AV Vmax:                2.2 m/s  AV Peak Gradient:       18.5 mmHg  AV Mean Gradient:       11.0 mmHg  AV VTI:                 41.0 cm  AV VTI Ratio:           0.64  AoV EOA, Contin:        2.67 cm²  AoV EOA, Contin i:      1.46 cm²/m²  AoV Dimensionless Index 0.64    Mitral Valve Measurements:     MV E Vmax: 1.0 m/s  MV A Vmax: 0.9 m/s  MV E/A:    1.1       Tricuspid Valve Measurements:     TR Vmax:          1.2 m/s  TR Peak Gradient: 5.3 mmHg  RA Pressure:      3 mmHg  PASP:             8 mmHg    ________________________________________________________________________________________  Electronically signed on 11/30/2024 at 1:57:15 PM by Kya Gonzalez MD         *** Final ***

## 2024-12-09 NOTE — PROGRESS NOTE ADULT - SUBJECTIVE AND OBJECTIVE BOX
[INTERVAL HX: ]  Patient seen and examined;  Chart reviewed and events noted;     confused.       [MEDICATIONS]  MEDICATIONS  (STANDING):  amLODIPine   Tablet 10 milliGRAM(s) Oral daily  ascorbic acid 500 milliGRAM(s) Oral two times a day  azaTHIOprine 50 milliGRAM(s) Oral two times a day  buPROPion XL (24-Hour) . 300 milliGRAM(s) Oral daily  carvedilol 12.5 milliGRAM(s) Oral every 12 hours  cloNIDine 0.1 milliGRAM(s) Oral three times a day  dextrose 5%. 1000 milliLiter(s) (50 mL/Hr) IV Continuous <Continuous>  dextrose 5%. 1000 milliLiter(s) (100 mL/Hr) IV Continuous <Continuous>  dextrose 50% Injectable 25 Gram(s) IV Push once  dextrose 50% Injectable 12.5 Gram(s) IV Push once  dextrose 50% Injectable 25 Gram(s) IV Push once  ertapenem  IVPB      ertapenem  IVPB 1000 milliGRAM(s) IV Intermittent every 24 hours  escitalopram 10 milliGRAM(s) Oral <User Schedule>  ferrous    sulfate 325 milliGRAM(s) Oral two times a day  glucagon  Injectable 1 milliGRAM(s) IntraMuscular once  hydrALAZINE 100 milliGRAM(s) Oral three times a day  insulin glargine Injectable (LANTUS) 24 Unit(s) SubCutaneous at bedtime  insulin lispro (ADMELOG) corrective regimen sliding scale   SubCutaneous three times a day before meals  insulin lispro Injectable (ADMELOG) 5 Unit(s) SubCutaneous three times a day before meals  levothyroxine 88 MICROGram(s) Oral <User Schedule>  lisinopril 40 milliGRAM(s) Oral daily  mineral oil enema 133 milliLiter(s) Rectal once  multivitamin/minerals/iron Oral Solution (CENTRUM) 15 milliLiter(s) Oral daily  pantoprazole    Tablet 40 milliGRAM(s) Oral two times a day  polyethylene glycol 3350 17 Gram(s) Oral daily  pyridoxine 50 milliGRAM(s) Oral daily  rosuvastatin 10 milliGRAM(s) Oral at bedtime  senna 2 Tablet(s) Oral at bedtime  tacrolimus 1 milliGRAM(s) Oral at bedtime  tacrolimus 2 milliGRAM(s) Oral daily    MEDICATIONS  (PRN):  aluminum hydroxide/magnesium hydroxide/simethicone Suspension 30 milliLiter(s) Oral every 4 hours PRN Dyspepsia  dextrose Oral Gel 15 Gram(s) Oral once PRN Blood Glucose LESS THAN 70 milliGRAM(s)/deciliter  hydrALAZINE Injectable 10 milliGRAM(s) IV Push every 8 hours PRN SBP >180 and HR 60-70bpm  melatonin 3 milliGRAM(s) Oral at bedtime PRN Insomnia  metoprolol tartrate Injectable 5 milliGRAM(s) IV Push every 6 hours PRN SBP >170  ondansetron Injectable 4 milliGRAM(s) IV Push every 8 hours PRN Nausea and/or Vomiting      [VITALS]  Vital Signs Last 24 Hrs  T(C): 37.9 (09 Dec 2024 19:57), Max: 37.9 (09 Dec 2024 19:57)  T(F): 100.2 (09 Dec 2024 19:57), Max: 100.2 (09 Dec 2024 19:57)  HR: 74 (09 Dec 2024 19:57) (62 - 74)  BP: 190/67 (09 Dec 2024 19:57) (111/50 - 190/67)  BP(mean): --  RR: 18 (09 Dec 2024 19:57) (18 - 19)  SpO2: 97% (09 Dec 2024 19:57) (95% - 98%)    Parameters below as of 09 Dec 2024 19:57  Patient On (Oxygen Delivery Method): room air      [WT/HT]  Daily     Daily Weight in k.7 (09 Dec 2024 04:58)  [VENT]      [PHYSICAL EXAM]  GEN: NAD  HEENT: normocephalic and atraumatic. EOMI. PERRL.    NECK: Supple.  No lymphadenopathy   LUNGS: Clear to auscultation.  HEART: Regular rate and rhythm,  no MRG  ABDOMEN: Soft, nontender, and nondistended.  Positive bowel sounds.    : No CVA tenderness  EXTREMITIES: Without edema.  NEUROLOGIC: grossly intact.  PSYCHIATRIC: Appropriate affect .  SKIN: No rash     [LABS:]                        7.0    5.02  )-----------( 332      ( 09 Dec 2024 10:40 )             21.3     12-    132[L]  |  97  |  22  ----------------------------<  194[H]  4.2   |  29  |  0.89    Ca    10.7[H]      09 Dec 2024 10:40            Iron - Total Binding Capacity.: 182 ug/dL *L* [220 - 430] (24 @ 10:00)    Ferritin: 973 ng/mL *H* [30 - 400] (24 @ 10:00)    Serum Protein Electrophoresis Interp: Duplicate Sample (24 @ 16:06)    Serum Protein Electrophoresis Interp: Weak Beta Migrating Paraprotein Identified (24 @ 11:50)    Ferritin: 885 ng/mL *H* [30 - 400] (24 @ 05:55)    Iron - Total Binding Capacity.: 164 ug/dL *L* [220 - 430] (24 @ 05:55)    Serum Protein Electrophoresis Interp: Weak Beta Migrating Paraprotein Identified (24 @ 17:10)    Immunofixation, Serum:   Weak IgM Lambda Band Identified      Reference Range: None Detected (24 @ 17:10)    Sedimentation Rate, Erythrocyte: 117 mm/hr *H* [0 - 20] (24 @ 16:05)      Urinalysis Basic - ( 09 Dec 2024 10:40 )    Color: x / Appearance: x / SG: x / pH: x  Gluc: 194 mg/dL / Ketone: x  / Bili: x / Urobili: x   Blood: x / Protein: x / Nitrite: x   Leuk Esterase: x / RBC: x / WBC x   Sq Epi: x / Non Sq Epi: x / Bacteria: x                [RADIOLOGY STUDIES:]

## 2024-12-09 NOTE — PROGRESS NOTE ADULT - ASSESSMENT
IMPRESSION  Anemia multifactorial in etiology related to prior renal transplant with meds and now with osteomyelitis and sepsis with Gram neg maury sepsis  Blood and urine culture with E. Coli  Hgb 6.8==>7.3 ==>7.1 g/dL after receiving 1 unit PRBC  expect to be transfusion dependent with active infection    Ferritin 885, iron 22, TIBC 164, sat 14%  PSA 12.3    ,     5cm liver mass  Prior known, s/p liver bx at [facility], wife brought in report which shows 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 also noted    MGUS  IgM-L  hx of splenomegaly 14cm since 2018    With abnormal MRI, IgM lambda monoclonal gammapathy, increasing splenomegaly and liver lesion pathology needs a repeat biopsy for potential plasmacytoma  lymphoplasmacytic malignant diagnosis        RECOMMENDATIONS    Follow CBC and transfuse as indicated  recommend conservative mgmt of anemia.   With prior renal transplant would use irradiated PRBC and try to keep transfusions to a minimum.     MGUS and hypercalcemia  VitD1,25OH 102.0, VitD 39.2  Continue renal evaluation.   Note calcium increased 11.7==>11.2==>10.6  discussed with Dr. Bruce  continue management of hypercalcemia    Liver Lesions  biopsy does not appear to rule out lymphoma  to have MRI with contrast  to have films review by IR for possible repeat biopsy    Osteomyelitis  continue ID and orthopedic management of osteomyelitis  on IV Ertapenem    Renal transplant  mgmt per renal  to continue azathioprine and tacrolimus     d/w Dr Upton

## 2024-12-09 NOTE — PROGRESS NOTE ADULT - ASSESSMENT
Patient is a 70yo M, PMH DM, HTN, HLD, h/o kidney transplant, hypothyroidism, presents to ED from [facility] for AMS. Patient is lethargic and unable to provide history at this time.    ESBL Bacteremia 2/2 Acute Cystitis  Sacral Wound/OM  Liver Mass w/ mets  Fevers  AMS 2/2 above  Febrile in the .5  UA positive for UTI  Flu/COVID/RSV negative  11/29 BCx ESBL E.coli , repeat negative  11/29 UCx   50,000 - 99,000 CFU/mL Escherichia coli    CT CAP w/ Small left pleural effusion with small loculated components  Right pelvic transplant kidney with mild fullness of the pelvic alyceal system. Stercoral proctitis.  Patchy sclerosis and osteolysis throughout the bilateral sacrum with pathologic fractures. There is soft tissue with stranding extending throughout the presacral and posterior extraperitoneal pelvic soft tissues.  There are a few bubbles of air/gas at the right sacral pathologic fracture, findings suggestive of infection/osteomyelitis.    MRI Again seen are comminuted bilateral sacral lona fractures with marked osseous edema and marked loss of normal T1 fatty marrow. Osseous edema with loss of normal T1 fatty marrow at thecaudal aspect of the left   posterior iliac bone adjacent to the sacroiliac joint. Previously seen fracture component is better visualized on CT. These findings could be secondary to extensive fracture deformities in an osteopenic patient   versus osteomyelitis if there was a history of a soft tissue ulcer extending to bone versus metastatic disease given the marked loss of T1 fatty marrow if there is a history of malignancy. Clinical correlation   with patient history is advised.    Reviewed w/ admitting attending, no sacral wound present. Suspect findings on sacral more related to mets from possible malignancy    Recommendations:   C/w ertapenem 1gm q24h x10 day course until 12/10  Trend temps/WBC  Monitor Hgb, transfusion prn protocol  Surgery following  Per Ortho no surgical intervention for fracture of sacrum    Additional care per primary team    Infectious Diseases will follow. Please call with any questions.  Vanessa Saul M.D.  [facility] Division of Infectious Diseases [phone]  For after 5 P.M. and weekends, please call [phone]  Available on [employer] TEAMS

## 2024-12-09 NOTE — PROGRESS NOTE ADULT - ASSESSMENT
Abnormal imaging  Anemia    Recommendations  - Recent liver biopsy from [facility] reviewed: 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  active chronic hepatitis, with extensive necrotic inflammatory process and fibrosis  - Heme onc following, recommend conservative management of anemia and pending films review by IR for possible repeat biopsy  - CBC noted, transfuse PRN   - PPI for ppx  - Monitor for any overt GI bleeding  - MRI non-contrast noted  - Outside path noted  ? component of AIH      I reviewed the overnight course of events on the unit, re-confirming the patient history. I discussed the care with the patient.  Differential diagnosis and plan of care discussed with patient after the evaluation.  35 minutes spent on total encounter of which more than fifty percent of the encounter was spent counseling and/or coordinating care by the attending physician. Abnormal imaging  Anemia    Initial Hgb 9.2   Today AM Hgb 7.0 and FOBT+  No overt signs of GI bleeding     Recommendations  - Conservative management for anemia, no endoscopic evaluation at this time   - Recent liver biopsy from [facility] reviewed: 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  active chronic hepatitis, with extensive necrotic inflammatory process and fibrosis  - Heme onc following, recommend conservative management of anemia and pending films review by IR for possible repeat biopsy  - CBC noted, transfuse PRN   - PPI for ppx  - Monitor for any overt GI bleeding  - MRI non-contrast noted  - Outside path noted  ? component of AIH      I reviewed the overnight course of events on the unit, re-confirming the patient history. I discussed the care with the patient.  Differential diagnosis and plan of care discussed with patient after the evaluation.  35 minutes spent on total encounter of which more than fifty percent of the encounter was spent counseling and/or coordinating care by the attending physician.

## 2024-12-09 NOTE — PROGRESS NOTE ADULT - ASSESSMENT
70yo M, PMH DM, HTN, HLD, h/o kidney transplant, hypothyroidism, presents to ED from [facility] for AMS, found to be febrile with positive UA. 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 Cardiology called for pericardial effusion.     Uncontrolled HTN/Pericardial Effusion  - CT chest: Small left pleural effusion with small loculated components, Small to moderate pericardial effusion  - TTE: EF 60%, trace pericardial effusion adjacent ot the posterior LV.  No significant valvular disease  - No signs of tamponade on examination    - No evidence of any meaningful volume overload   - Okay with IV fluids.   - He appears to be dry on exam. Encourage fluid/po intake     - EKG with nonspecific TWI anteriorly, repeat unchanged  - No evidence of any active ischemia   - Continue statin   - now off ASA in setting of persistent anemia (neg FOBT), with Hgb 6-7    - BP labile and uncontrolled 130-150's systolics  - Continue max Norvasc 10, Hydralazine 100 TID and Lisinopril 40 dose   - Continue Clonidine increase to 0.1 mg PO TID   - Continue coreg ( would up titate next) if bp remains uncontrolled     - Heme and GI following for anemia  - Ortho following for pathological sacral Fx with possible OM.   - No acute orthopedic surgical intervention at this time.  - Abx per ID     - Monitor and replete lytes, keep K>4, Mg>2.  - Will continue to follow.    Robin Cordoba, MS FNP, AGACNP  Nurse Practitioner- Cardiology   Please call on TEAMS

## 2024-12-09 NOTE — PROGRESS NOTE ADULT - ASSESSMENT
Patient is a 68yo M, PMH DM, HTN, HLD, h/o kidney transplant, hypothyroidism, presents to ED from [facility] for AMS likely  acute  metabolic encephalopathy       AMS 2/2 Sepsispoa  2/2 multiple infections (UTI, sacral infection, possible osteomyelitis), E coli bacteremia  cause of acute  metabolic encephalopathy   Sepsis 2/2 ESBL Ecoli UTI and bacteremia. sensitive to ertapenem.  - C/W  Meropenum 1gm q24h x10 day course until 12/10 d/w  ID Dr. Saul with  midline   -Tylenol PRN pain and fever- diet as tolerated   - aspiration and fall risk - Continue Senna, Miralax, PRN dulcolax suppositories   - MR pelvis/sacrum to eval for osteomyelitis- last    Again seen are comminuted bilateral sacral lona fractures with marked   osseous edema and marked loss of normal T1 fatty marrow. Osseous edema   with loss of normal T1 fatty marrow at the caudal aspect of the left   posterior iliac bone adjacent to the sacroiliac joint. Previously seen   fracture component is better visualized on CT. These findings could be   secondary to extensive fracture deformities in an osteopenic patient   versus osteomyelitis if there was a history of a soft tissue ulcer   extending to bone versus metastatic disease given the marked loss of T1   fatty marrow if there is a history of malignancy. Clinical correlation   with patient history is advised.  - Per Ortho no surgical intervention for fracture of sacrum  / nees dolores     Acute on Chronic anemia :  - Likely due to infection,  and CKD- No signs of acute  bleeding noted    - Iron22/ sat low    iron deficiency with chr anemia  +  -- Hem Dr. Sal  gp     s/p   1 unit prbc - hb  7   as per hem/onch hold transfusion as pt  asymptomatic and with transplant kidney With prior renal transplant would use irradiated PRBC and try to keep transfusions to a minimum.     Hepatic lesion  CT abd with 5.5cm lesion on R hepatic lobe also noticed  retroperitoneal  fibrosis  and spleen mass      hem/onc   DR SAL / and nephro dr vizcarra   would like to  repeat   liver biopsy by ir next wk   after mri abd     -     histopath not fully  reported by  [facility] in sept /2024   paper work  . wife aware about liver and spleen  mass and  benin last biopsy report  -but  ok for repeat biopsy if needed .     Diabetes Mellitus / hyperglycemia  dm DietPre meal Insulin  5 unit tid,   increase to Lantus to 24 u QHS  fingerstick glucose closely follow up.   ISS A1c 7.4 -     HTN: Uncontrolled   Continue Lisinopril 20mg  increase to 40 mg  daily   Continue Hydralazine 100mg TID with hold parameters  Continue Coreg, switched from Metoprolol - 12.5 mg po bid  Continue Amlodipine 10mg daily -  add on clonidine 0.1  mg tid .   - fu bp closely   HLD  Continue Crestor 10mg daily    s/p Kidney transplant/CKD 3  Continue Tacrolimus 2mg daily  Continue Tacrolimus 1mg QHS  Continue Azathioprine 50mg BID  Nephrology consulted dr vizcarra     Hypothyroidism  Continue Levothyroxine 88mcg QAM     hypercalcemia was possible  sec to  underlying  lymphoproliferative dis  sec to vit D  s/p  calcitonin   s/p   Aredia -   ca  improving .  Depression  Continue Escitalopram 10mg TID  Continue Bupropion 300mg daily    hx  liver mass and spleen mass - per wife  aware / hem onc dr ivey gp following , mri abd pelvis  today  after that possible ir guided liver biopsy / hold on asa.     DVT ppx: Hold AC due to anemia and risk of bleeding           Dispo: JOCELYNN bernal

## 2024-12-09 NOTE — PROGRESS NOTE ADULT - SUBJECTIVE AND OBJECTIVE BOX
[facility], Division of Infectious Diseases  WANG Mccoy Y. Patel, S. Shah, G. [facility]  [phone]    Name: JAN CALVIN  Age: 69y  Gender: Male  MRN: [id number]    Interval History:  No acute overnight events. Afebrile  Notes reviewed    Antibiotics:  ertapenem  IVPB      ertapenem  IVPB 1000 milliGRAM(s) IV Intermittent every 24 hours      Medications:  aluminum hydroxide/magnesium hydroxide/simethicone Suspension 30 milliLiter(s) Oral every 4 hours PRN  amLODIPine   Tablet 10 milliGRAM(s) Oral daily  ascorbic acid 500 milliGRAM(s) Oral two times a day  azaTHIOprine 50 milliGRAM(s) Oral two times a day  buPROPion XL (24-Hour) . 300 milliGRAM(s) Oral daily  carvedilol 12.5 milliGRAM(s) Oral every 12 hours  cloNIDine 0.1 milliGRAM(s) Oral every 12 hours  dextrose 5%. 1000 milliLiter(s) IV Continuous <Continuous>  dextrose 5%. 1000 milliLiter(s) IV Continuous <Continuous>  dextrose 50% Injectable 12.5 Gram(s) IV Push once  dextrose 50% Injectable 25 Gram(s) IV Push once  dextrose 50% Injectable 25 Gram(s) IV Push once  dextrose Oral Gel 15 Gram(s) Oral once PRN  ertapenem  IVPB      ertapenem  IVPB 1000 milliGRAM(s) IV Intermittent every 24 hours  escitalopram 10 milliGRAM(s) Oral <User Schedule>  ferrous    sulfate 325 milliGRAM(s) Oral two times a day  glucagon  Injectable 1 milliGRAM(s) IntraMuscular once  hydrALAZINE 100 milliGRAM(s) Oral three times a day  hydrALAZINE Injectable 10 milliGRAM(s) IV Push every 8 hours PRN  insulin glargine Injectable (LANTUS) 20 Unit(s) SubCutaneous at bedtime  insulin lispro (ADMELOG) corrective regimen sliding scale   SubCutaneous three times a day before meals  insulin lispro Injectable (ADMELOG) 5 Unit(s) SubCutaneous three times a day before meals  levothyroxine 88 MICROGram(s) Oral <User Schedule>  lisinopril 40 milliGRAM(s) Oral daily  melatonin 3 milliGRAM(s) Oral at bedtime PRN  metoprolol tartrate Injectable 5 milliGRAM(s) IV Push every 6 hours PRN  mineral oil enema 133 milliLiter(s) Rectal once  multivitamin/minerals/iron Oral Solution (CENTRUM) 15 milliLiter(s) Oral daily  ondansetron Injectable 4 milliGRAM(s) IV Push every 8 hours PRN  pantoprazole    Tablet 40 milliGRAM(s) Oral two times a day  polyethylene glycol 3350 17 Gram(s) Oral daily  pyridoxine 50 milliGRAM(s) Oral daily  rosuvastatin 10 milliGRAM(s) Oral at bedtime  senna 2 Tablet(s) Oral at bedtime  tacrolimus 2 milliGRAM(s) Oral daily  tacrolimus 1 milliGRAM(s) Oral at bedtime      Review of Systems:  unable to obtain    Allergies: No Known Allergies    For details regarding the patient's past medical history, social history, family history, and other miscellaneous elements, please refer the initial infectious diseases consultation and/or the admitting history and physical examination for this admission.    Objective:  Vitals:   T(C): 36.8 (12-09-24 @ 04:58), Max: 37.4 (12-08-24 @ 21:22)  HR: 62 (12-09-24 @ 04:58) (62 - 66)  BP: 157/66 (12-09-24 @ 04:58) (135/74 - 157/66)  RR: 18 (12-09-24 @ 04:58) (18 - 19)  SpO2: 98% (12-09-24 @ 04:58) (95% - 98%)    Physical Examination:  General: no acute distress  HEENT: NC/AT, EOMI,  Cardio: S1, S2 heard, RRR, no murmurs  Resp: breath sounds heard bilaterally, no rales, wheezes or rhonchi  Abd: soft, NT, ND,  Ext: no edema or cyanosis  Skin: warm, dry, no visible rash      Laboratory Studies:  CBC:                       7.1    4.57  )-----------( 326      ( 08 Dec 2024 07:15 )             21.8     CMP: 12-08    132[L]  |  100  |  19  ----------------------------<  263[H]  4.5   |  27  |  0.76    Ca    10.6[H]      08 Dec 2024 07:15        Urinalysis Basic - ( 08 Dec 2024 07:15 )    Color: x / Appearance: x / SG: x / pH: x  Gluc: 263 mg/dL / Ketone: x  / Bili: x / Urobili: x   Blood: x / Protein: x / Nitrite: x   Leuk Esterase: x / RBC: x / WBC x   Sq Epi: x / Non Sq Epi: x / Bacteria: x        Microbiology: reviewed    Culture - Blood (collected 11-30-24 @ 07:05)  Source: .Blood BLOOD  Final Report (12-05-24 @ 13:00):    No growth at 5 days    Culture - Blood (collected 11-30-24 @ 07:00)  Source: .Blood BLOOD  Final Report (12-05-24 @ 13:00):    No growth at 5 days    Culture - Urine (collected 11-29-24 @ 11:00)  Source: Catheterized  Final Report (12-01-24 @ 10:17):    50,000 - 99,000 CFU/mL Escherichia coli ESBL  Organism: Escherichia coli ESBL (12-01-24 @ 10:17)  Organism: Escherichia coli ESBL (12-01-24 @ 10:17)      Method Type: CHRIST      -  Ampicillin: R >16 These ampicillin results predict results for amoxicillin      -  Ampicillin/Sulbactam: I 16/8      -  Aztreonam: R >16      -  Cefazolin: R >16 For uncomplicated UTI with K. pneumoniae, E. coli, or P. mirablis: CHRIST <=16 is sensitive and CHRIST >=32 is resistant. This also predicts results for oral agents cefaclor, cefdinir, cefpodoxime, cefprozil, cefuroxime axetil, cephalexin and locarbef for uncomplicated UTI. Note that some isolates may be susceptible to these agents while testing resistant to cefazolin.      -  Cefepime: R >16      -  Ceftriaxone: R >32      -  Cefuroxime: R >16      -  Ciprofloxacin: R >2      -  Ertapenem: S <=0.5      -  Gentamicin: R >8      -  Imipenem: S <=1      -  Levofloxacin: R 4      -  Meropenem: S <=1      -  Nitrofurantoin: S <=32 Should not be used to treat pyelonephritis      -  Piperacillin/Tazobactam: S <=8      -  Tobramycin: R >8      -  Trimethoprim/Sulfamethoxazole: R >2/38    Urinalysis with Rflx Culture (collected 11-29-24 @ 11:00)    Culture - Blood (collected 11-29-24 @ 07:15)  Source: .Blood BLOOD  Gram Stain (11-29-24 @ 21:31):    Growth in aerobic bottle: Gram Negative Rods    Growth in anaerobic bottle: Gram Negative Rods  Final Report (12-01-24 @ 17:40):    Growth in aerobic and anaerobic bottles: Escherichia coli ESBL  Organism: Escherichia coli ESBL (12-01-24 @ 17:40)  Organism: Escherichia coli ESBL (12-01-24 @ 17:40)      Method Type: CHRIST      -  Ampicillin: R >16 These ampicillin results predict results for amoxicillin      -  Ampicillin/Sulbactam: R >16/8      -  Aztreonam: R >16      -  Cefazolin: R >16      -  Cefepime: R >16      -  Ceftriaxone: R >32      -  Ciprofloxacin: R >2      -  Ertapenem: S <=0.5      -  Gentamicin: R >8      -  Imipenem: S <=1      -  Levofloxacin: R 4      -  Meropenem: S <=1      -  Piperacillin/Tazobactam: R <=8      -  Tobramycin: R >8      -  Trimethoprim/Sulfamethoxazole: R >2/38    Culture - Blood (collected 11-29-24 @ 07:10)  Source: .Blood BLOOD  Gram Stain (11-29-24 @ 22:18):    Growth in anaerobic bottle: Gram Negative Rods    Growth in aerobic bottle: Gram Negative Rods  Final Report (12-01-24 @ 17:41):    Growth in aerobic and anaerobic bottles: Escherichia coli ESBL    Direct identification is available within approximately 3-5    hours either by Blood Panel Multiplexed PCR or Direct    MALDI-TOF. Details: [email or web address]  Organism: Blood Culture PCR (12-01-24 @ 17:41)  Organism: Blood Culture PCR (12-01-24 @ 17:41)      Method Type: PCR      -  Escherichia coli: Detec      -  ESBL: Detec      -  CTX-M Resistance Marker: Detec          Radiology: reviewed

## 2024-12-10 LAB
ANION GAP SERPL CALC-SCNC: 6 MMOL/L — SIGNIFICANT CHANGE UP (ref 5–17)
ANISOCYTOSIS BLD QL: SIGNIFICANT CHANGE UP
BASOPHILS # BLD AUTO: 0.03 K/UL — SIGNIFICANT CHANGE UP (ref 0–0.2)
BASOPHILS NFR BLD AUTO: 0.6 % — SIGNIFICANT CHANGE UP (ref 0–2)
BUN SERPL-MCNC: 32 MG/DL — HIGH (ref 7–23)
CALCIUM SERPL-MCNC: 11.4 MG/DL — HIGH (ref 8.5–10.1)
CHLORIDE SERPL-SCNC: 100 MMOL/L — SIGNIFICANT CHANGE UP (ref 96–108)
CO2 SERPL-SCNC: 28 MMOL/L — SIGNIFICANT CHANGE UP (ref 22–31)
CREAT SERPL-MCNC: 0.97 MG/DL — SIGNIFICANT CHANGE UP (ref 0.5–1.3)
EGFR: 86 ML/MIN/1.73M2 — SIGNIFICANT CHANGE UP
ELLIPTOCYTES BLD QL SMEAR: SLIGHT — SIGNIFICANT CHANGE UP
EOSINOPHIL # BLD AUTO: 0.03 K/UL — SIGNIFICANT CHANGE UP (ref 0–0.5)
EOSINOPHIL NFR BLD AUTO: 0.6 % — SIGNIFICANT CHANGE UP (ref 0–6)
GLUCOSE BLDC GLUCOMTR-MCNC: 222 MG/DL — HIGH (ref 70–99)
GLUCOSE SERPL-MCNC: 203 MG/DL — HIGH (ref 70–99)
HCT VFR BLD CALC: 19.8 % — CRITICAL LOW (ref 39–50)
HGB BLD-MCNC: 6.5 G/DL — CRITICAL LOW (ref 13–17)
IMM GRANULOCYTES NFR BLD AUTO: 1.7 % — HIGH (ref 0–0.9)
LYMPHOCYTES # BLD AUTO: 0.32 K/UL — LOW (ref 1–3.3)
LYMPHOCYTES # BLD AUTO: 5.9 % — LOW (ref 13–44)
MACROCYTES BLD QL: SLIGHT — SIGNIFICANT CHANGE UP
MANUAL SMEAR VERIFICATION: SIGNIFICANT CHANGE UP
MCHC RBC-ENTMCNC: 29.4 PG — SIGNIFICANT CHANGE UP (ref 27–34)
MCHC RBC-ENTMCNC: 32.8 G/DL — SIGNIFICANT CHANGE UP (ref 32–36)
MCV RBC AUTO: 89.6 FL — SIGNIFICANT CHANGE UP (ref 80–100)
MICROCYTES BLD QL: SLIGHT — SIGNIFICANT CHANGE UP
MONOCYTES # BLD AUTO: 0.41 K/UL — SIGNIFICANT CHANGE UP (ref 0–0.9)
MONOCYTES NFR BLD AUTO: 7.6 % — SIGNIFICANT CHANGE UP (ref 2–14)
NEUTROPHILS # BLD AUTO: 4.5 K/UL — SIGNIFICANT CHANGE UP (ref 1.8–7.4)
NEUTROPHILS NFR BLD AUTO: 83.6 % — HIGH (ref 43–77)
NRBC # BLD: 0 /100 WBCS — SIGNIFICANT CHANGE UP (ref 0–0)
OVALOCYTES BLD QL SMEAR: SLIGHT — SIGNIFICANT CHANGE UP
PLAT MORPH BLD: NORMAL — SIGNIFICANT CHANGE UP
PLATELET # BLD AUTO: 336 K/UL — SIGNIFICANT CHANGE UP (ref 150–400)
POIKILOCYTOSIS BLD QL AUTO: SLIGHT — SIGNIFICANT CHANGE UP
POLYCHROMASIA BLD QL SMEAR: SLIGHT — SIGNIFICANT CHANGE UP
POTASSIUM SERPL-MCNC: 4.5 MMOL/L — SIGNIFICANT CHANGE UP (ref 3.5–5.3)
POTASSIUM SERPL-SCNC: 4.5 MMOL/L — SIGNIFICANT CHANGE UP (ref 3.5–5.3)
RBC # BLD: 2.21 M/UL — LOW (ref 4.2–5.8)
RBC # FLD: 19.8 % — HIGH (ref 10.3–14.5)
RBC BLD AUTO: ABNORMAL
SODIUM SERPL-SCNC: 134 MMOL/L — LOW (ref 135–145)
WBC # BLD: 5.38 K/UL — SIGNIFICANT CHANGE UP (ref 3.8–10.5)
WBC # FLD AUTO: 5.38 K/UL — SIGNIFICANT CHANGE UP (ref 3.8–10.5)

## 2024-12-10 PROCEDURE — 99232 SBSQ HOSP IP/OBS MODERATE 35: CPT

## 2024-12-10 PROCEDURE — 99233 SBSQ HOSP IP/OBS HIGH 50: CPT

## 2024-12-10 RX ORDER — CALCITONIN SALMON 200 [IU]/ML
370 INJECTION, SOLUTION INTRAMUSCULAR; SUBCUTANEOUS EVERY 12 HOURS
Refills: 0 | Status: COMPLETED | OUTPATIENT
Start: 2024-12-10 | End: 2024-12-11

## 2024-12-10 RX ADMIN — INSULIN GLARGINE 24 UNIT(S): 100 INJECTION, SOLUTION SUBCUTANEOUS at 21:45

## 2024-12-10 RX ADMIN — Medication 5 UNIT(S): at 08:14

## 2024-12-10 RX ADMIN — PANTOPRAZOLE SODIUM 40 MILLIGRAM(S): 40 TABLET, DELAYED RELEASE ORAL at 17:21

## 2024-12-10 RX ADMIN — Medication 5 UNIT(S): at 17:20

## 2024-12-10 RX ADMIN — Medication 15 MILLILITER(S): at 12:43

## 2024-12-10 RX ADMIN — TACROLIMUS 1 MILLIGRAM(S): 5 CAPSULE ORAL at 21:44

## 2024-12-10 RX ADMIN — Medication 500 MILLIGRAM(S): at 05:34

## 2024-12-10 RX ADMIN — CARVEDILOL 12.5 MILLIGRAM(S): 25 TABLET, FILM COATED ORAL at 05:34

## 2024-12-10 RX ADMIN — CLONIDINE HYDROCHLORIDE 0.1 MILLIGRAM(S): 0.3 TABLET ORAL at 15:01

## 2024-12-10 RX ADMIN — AZATHIOPRINE 50 MILLIGRAM(S): 100 TABLET ORAL at 05:35

## 2024-12-10 RX ADMIN — AZATHIOPRINE 50 MILLIGRAM(S): 100 TABLET ORAL at 17:21

## 2024-12-10 RX ADMIN — ESCITALOPRAM OXALATE 10 MILLIGRAM(S): 10 TABLET, FILM COATED ORAL at 17:21

## 2024-12-10 RX ADMIN — Medication 50 MILLIGRAM(S): at 12:43

## 2024-12-10 RX ADMIN — Medication 4: at 08:13

## 2024-12-10 RX ADMIN — Medication 325 MILLIGRAM(S): at 05:35

## 2024-12-10 RX ADMIN — ESCITALOPRAM OXALATE 10 MILLIGRAM(S): 10 TABLET, FILM COATED ORAL at 06:00

## 2024-12-10 RX ADMIN — HYDRALAZINE HYDROCHLORIDE 100 MILLIGRAM(S): 10 TABLET ORAL at 21:44

## 2024-12-10 RX ADMIN — ROSUVASTATIN CALCIUM 10 MILLIGRAM(S): 5 TABLET, FILM COATED ORAL at 21:44

## 2024-12-10 RX ADMIN — Medication 2: at 12:42

## 2024-12-10 RX ADMIN — Medication 300 MILLIGRAM(S): at 12:43

## 2024-12-10 RX ADMIN — Medication 500 MILLIGRAM(S): at 17:22

## 2024-12-10 RX ADMIN — Medication 5 UNIT(S): at 12:42

## 2024-12-10 RX ADMIN — POLYETHYLENE GLYCOL 3350 17 GRAM(S): 17 POWDER, FOR SOLUTION ORAL at 12:43

## 2024-12-10 RX ADMIN — CALCITONIN SALMON 370 INTERNATIONAL UNIT(S): 200 INJECTION, SOLUTION INTRAMUSCULAR; SUBCUTANEOUS at 17:21

## 2024-12-10 RX ADMIN — TACROLIMUS 2 MILLIGRAM(S): 5 CAPSULE ORAL at 12:44

## 2024-12-10 RX ADMIN — HYDRALAZINE HYDROCHLORIDE 100 MILLIGRAM(S): 10 TABLET ORAL at 15:01

## 2024-12-10 RX ADMIN — HYDRALAZINE HYDROCHLORIDE 100 MILLIGRAM(S): 10 TABLET ORAL at 05:34

## 2024-12-10 RX ADMIN — CLONIDINE HYDROCHLORIDE 0.1 MILLIGRAM(S): 0.3 TABLET ORAL at 05:35

## 2024-12-10 RX ADMIN — Medication 325 MILLIGRAM(S): at 17:21

## 2024-12-10 RX ADMIN — LISINOPRIL 40 MILLIGRAM(S): 20 TABLET ORAL at 05:35

## 2024-12-10 RX ADMIN — Medication 88 MICROGRAM(S): at 06:00

## 2024-12-10 RX ADMIN — Medication 2 TABLET(S): at 21:45

## 2024-12-10 RX ADMIN — CARVEDILOL 12.5 MILLIGRAM(S): 25 TABLET, FILM COATED ORAL at 17:21

## 2024-12-10 RX ADMIN — PANTOPRAZOLE SODIUM 40 MILLIGRAM(S): 40 TABLET, DELAYED RELEASE ORAL at 05:36

## 2024-12-10 RX ADMIN — ERTAPENEM 120 MILLIGRAM(S): 1 INJECTION, POWDER, LYOPHILIZED, FOR SOLUTION INTRAMUSCULAR; INTRAVENOUS at 05:39

## 2024-12-10 RX ADMIN — ESCITALOPRAM OXALATE 10 MILLIGRAM(S): 10 TABLET, FILM COATED ORAL at 12:44

## 2024-12-10 RX ADMIN — AMLODIPINE BESYLATE 10 MILLIGRAM(S): 10 TABLET ORAL at 05:36

## 2024-12-10 NOTE — PROGRESS NOTE ADULT - SUBJECTIVE AND OBJECTIVE BOX
[facility]                                                       Dr. Bruce, Dr. Prabhakar, Dr. Cabrales, Dr. Zaragoza, Dr. Bingham, Dr. Loya                                      [facility] [place], [facility], [address]                                                 [address] [address]                                      Ph: [phone]  Fax: [phone]                                         Ph: [phone]  Fax: [phone]      Patient is a 69y old  Male who presents with a chief complaint of AMS (01 Dec 2024 11:19)  Patient seen in follow up for CKD, h/o Kidney transplant.        PAST MEDICAL HISTORY:  Diabetes Mellitus Type II    ESRD on Dialysis    GERD (gastroesophageal reflux disease)    Depression    Hypothyroidism    Hyperlipidemia    Kidney transplanted    Hypertension    ANGELIKA (obstructive sleep apnea)    Peripheral neuropathy    Shoulder fracture    MEDICATIONS  (STANDING):  amLODIPine   Tablet 10 milliGRAM(s) Oral daily  ascorbic acid 500 milliGRAM(s) Oral two times a day  azaTHIOprine 50 milliGRAM(s) Oral two times a day  buPROPion XL (24-Hour) . 300 milliGRAM(s) Oral daily  carvedilol 12.5 milliGRAM(s) Oral every 12 hours  cloNIDine 0.1 milliGRAM(s) Oral three times a day  dextrose 5%. 1000 milliLiter(s) (100 mL/Hr) IV Continuous <Continuous>  dextrose 5%. 1000 milliLiter(s) (50 mL/Hr) IV Continuous <Continuous>  dextrose 50% Injectable 25 Gram(s) IV Push once  dextrose 50% Injectable 12.5 Gram(s) IV Push once  dextrose 50% Injectable 25 Gram(s) IV Push once  ertapenem  IVPB      ertapenem  IVPB 1000 milliGRAM(s) IV Intermittent every 24 hours  escitalopram 10 milliGRAM(s) Oral <User Schedule>  ferrous    sulfate 325 milliGRAM(s) Oral two times a day  glucagon  Injectable 1 milliGRAM(s) IntraMuscular once  hydrALAZINE 100 milliGRAM(s) Oral three times a day  insulin glargine Injectable (LANTUS) 24 Unit(s) SubCutaneous at bedtime  insulin lispro (ADMELOG) corrective regimen sliding scale   SubCutaneous three times a day before meals  insulin lispro Injectable (ADMELOG) 5 Unit(s) SubCutaneous three times a day before meals  levothyroxine 88 MICROGram(s) Oral <User Schedule>  lisinopril 40 milliGRAM(s) Oral daily  mineral oil enema 133 milliLiter(s) Rectal once  multivitamin/minerals/iron Oral Solution (CENTRUM) 15 milliLiter(s) Oral daily  pantoprazole    Tablet 40 milliGRAM(s) Oral two times a day  polyethylene glycol 3350 17 Gram(s) Oral daily  pyridoxine 50 milliGRAM(s) Oral daily  rosuvastatin 10 milliGRAM(s) Oral at bedtime  senna 2 Tablet(s) Oral at bedtime  tacrolimus 2 milliGRAM(s) Oral daily  tacrolimus 1 milliGRAM(s) Oral at bedtime    MEDICATIONS  (PRN):  aluminum hydroxide/magnesium hydroxide/simethicone Suspension 30 milliLiter(s) Oral every 4 hours PRN Dyspepsia  dextrose Oral Gel 15 Gram(s) Oral once PRN Blood Glucose LESS THAN 70 milliGRAM(s)/deciliter  hydrALAZINE Injectable 10 milliGRAM(s) IV Push every 8 hours PRN SBP >180 and HR 60-70bpm  melatonin 3 milliGRAM(s) Oral at bedtime PRN Insomnia  metoprolol tartrate Injectable 5 milliGRAM(s) IV Push every 6 hours PRN SBP >170  ondansetron Injectable 4 milliGRAM(s) IV Push every 8 hours PRN Nausea and/or Vomiting    T(C): 37 (12-10-24 @ 12:25), Max: 37.9 (12-09-24 @ 19:57)  HR: 64 (12-10-24 @ 12:25) (62 - 74)  BP: 97/47 (12-10-24 @ 12:25) (97/47 - 190/67)  RR: 19 (12-10-24 @ 12:25)  SpO2: 95% (12-10-24 @ 12:25)  Wt(kg): --  I&O's Detail    09 Dec 2024 07:01  -  10 Dec 2024 07:00  --------------------------------------------------------  IN:    Oral Fluid: 800 mL  Total IN: 800 mL    OUT:    Voided (mL): 1300 mL  Total OUT: 1300 mL    Total NET: -500 mL                      PHYSICAL EXAM:  General: No distress  Respiratory: b/l air entry  Cardiovascular: S1 S2  Gastrointestinal: soft  Extremities:  no edema          LABORATORY:                        6.5    5.38  )-----------( 336      ( 10 Dec 2024 07:30 )             19.8     12-10    134[L]  |  100  |  32[H]  ----------------------------<  203[H]  4.5   |  28  |  0.97    Ca    11.4[H]      10 Dec 2024 07:30      Sodium: 134 mmol/L (12-10 @ 07:30)  Sodium: 132 mmol/L (12-09 @ 10:40)    Potassium: 4.5 mmol/L (12-10 @ 07:30)  Potassium: 4.2 mmol/L (12-09 @ 10:40)    Hemoglobin: 6.5 g/dL (12-10 @ 07:30)  Hemoglobin: 7.0 g/dL (12-09 @ 10:40)  Hemoglobin: 7.1 g/dL (12-08 @ 07:15)    Creatinine, Serum 0.97 (12-10 @ 07:30)  Creatinine, Serum 0.89 (12-09 @ 10:40)  Creatinine, Serum 0.76 (12-08 @ 07:15)          Urinalysis Basic - ( 10 Dec 2024 07:30 )    Color: x / Appearance: x / SG: x / pH: x  Gluc: 203 mg/dL / Ketone: x  / Bili: x / Urobili: x   Blood: x / Protein: x / Nitrite: x   Leuk Esterase: x / RBC: x / WBC x   Sq Epi: x / Non Sq Epi: x / Bacteria: x

## 2024-12-10 NOTE — PROGRESS NOTE ADULT - SUBJECTIVE AND OBJECTIVE BOX
[facility]  Kaz Bermeo PA-C  [address] [place], [place] [id number]  [phone]      INTERVAL HPI/OVERNIGHT EVENTS:  Pt s/e  Hgb noted  No reported overt GIB symptoms    MEDICATIONS  (STANDING):  amLODIPine   Tablet 10 milliGRAM(s) Oral daily  ascorbic acid 500 milliGRAM(s) Oral two times a day  azaTHIOprine 50 milliGRAM(s) Oral two times a day  buPROPion XL (24-Hour) . 300 milliGRAM(s) Oral daily  carvedilol 12.5 milliGRAM(s) Oral every 12 hours  cloNIDine 0.1 milliGRAM(s) Oral three times a day  dextrose 5%. 1000 milliLiter(s) (50 mL/Hr) IV Continuous <Continuous>  dextrose 5%. 1000 milliLiter(s) (100 mL/Hr) IV Continuous <Continuous>  dextrose 50% Injectable 25 Gram(s) IV Push once  dextrose 50% Injectable 12.5 Gram(s) IV Push once  dextrose 50% Injectable 25 Gram(s) IV Push once  ertapenem  IVPB      ertapenem  IVPB 1000 milliGRAM(s) IV Intermittent every 24 hours  escitalopram 10 milliGRAM(s) Oral <User Schedule>  ferrous    sulfate 325 milliGRAM(s) Oral two times a day  glucagon  Injectable 1 milliGRAM(s) IntraMuscular once  hydrALAZINE 100 milliGRAM(s) Oral three times a day  insulin glargine Injectable (LANTUS) 24 Unit(s) SubCutaneous at bedtime  insulin lispro (ADMELOG) corrective regimen sliding scale   SubCutaneous three times a day before meals  insulin lispro Injectable (ADMELOG) 5 Unit(s) SubCutaneous three times a day before meals  levothyroxine 88 MICROGram(s) Oral <User Schedule>  lisinopril 40 milliGRAM(s) Oral daily  mineral oil enema 133 milliLiter(s) Rectal once  multivitamin/minerals/iron Oral Solution (CENTRUM) 15 milliLiter(s) Oral daily  pantoprazole    Tablet 40 milliGRAM(s) Oral two times a day  polyethylene glycol 3350 17 Gram(s) Oral daily  pyridoxine 50 milliGRAM(s) Oral daily  rosuvastatin 10 milliGRAM(s) Oral at bedtime  senna 2 Tablet(s) Oral at bedtime  tacrolimus 1 milliGRAM(s) Oral at bedtime  tacrolimus 2 milliGRAM(s) Oral daily    MEDICATIONS  (PRN):  aluminum hydroxide/magnesium hydroxide/simethicone Suspension 30 milliLiter(s) Oral every 4 hours PRN Dyspepsia  dextrose Oral Gel 15 Gram(s) Oral once PRN Blood Glucose LESS THAN 70 milliGRAM(s)/deciliter  hydrALAZINE Injectable 10 milliGRAM(s) IV Push every 8 hours PRN SBP >180 and HR 60-70bpm  melatonin 3 milliGRAM(s) Oral at bedtime PRN Insomnia  metoprolol tartrate Injectable 5 milliGRAM(s) IV Push every 6 hours PRN SBP >170  ondansetron Injectable 4 milliGRAM(s) IV Push every 8 hours PRN Nausea and/or Vomiting      Allergies    No Known Allergies    PHYSICAL EXAM:   Vital Signs:  Vital Signs Last 24 Hrs  T(C): 37.9 (10 Dec 2024 05:55), Max: 37.9 (09 Dec 2024 19:57)  T(F): 100.2 (10 Dec 2024 05:55), Max: 100.2 (09 Dec 2024 19:57)  HR: 67 (10 Dec 2024 04:49) (66 - 74)  BP: 154/63 (10 Dec 2024 04:49) (111/50 - 190/67)  BP(mean): --  RR: 18 (10 Dec 2024 04:49) (18 - 19)  SpO2: 96% (10 Dec 2024 04:49) (96% - 97%)    Parameters below as of 10 Dec 2024 04:49  Patient On (Oxygen Delivery Method): room air      Daily     Daily Weight in k.6 (10 Dec 2024 04:49)    GENERAL:  Appears stated age  HEENT:  NC/AT  CHEST:  Full & symmetric excursion  HEART:  Regular rhythm  ABDOMEN:  Soft, non-tender, non-distended  EXTEREMITIES:  no cyanosis  SKIN:  No rash      LABS:                        6.5    5.38  )-----------( 336      ( 10 Dec 2024 07:30 )             19.8     12-10    134[L]  |  100  |  32[H]  ----------------------------<  203[H]  4.5   |  28  |  0.97    Ca    11.4[H]      10 Dec 2024 07:30        Urinalysis Basic - ( 10 Dec 2024 07:30 )    Color: x / Appearance: x / SG: x / pH: x  Gluc: 203 mg/dL / Ketone: x  / Bili: x / Urobili: x   Blood: x / Protein: x / Nitrite: x   Leuk Esterase: x / RBC: x / WBC x   Sq Epi: x / Non Sq Epi: x / Bacteria: x

## 2024-12-10 NOTE — PROGRESS NOTE ADULT - ASSESSMENT
CKD 3, h/o Kidney transplant ~2012, h/o Left Nephrectomy  Diabetes  Hypertension  Sepsis, UTI, Sacral osteomyelitis, Gram negative bacteremia  Hypokalemia  Hypercalcemia, Elevated 1,25 Vitamin D (ACEI level: WNL), R/o Lymphoma  Hypomagnesemia  Hypophosphatemia  Anemia  + Liver mass    12/09/24: Stable renal indices. To continue current immuno suppressants. For MRI. Calcium levels slowly trending down. .  Monitor blood sugar levels. Insulin coverage as needed. Dietary restriction. Will lower lisinopril dose if BP remains low. Trend BP and titrate BP meds as needed.   Trend BP and titrate BP meds as needed. Hematology follow up. Will follow electrolytes and renal function trend.   12/10/24: Stable renal indices. Calcium levels now worsening again. Will dose steroids. MRI results pending.        CKD 3, h/o Kidney transplant ~2012, h/o Left Nephrectomy  Diabetes  Hypertension  Sepsis, UTI, Sacral osteomyelitis, Gram negative bacteremia  Hypokalemia  Hypercalcemia, Elevated 1,25 Vitamin D (ACEI level: WNL), R/o Lymphoma  Hypomagnesemia  Hypophosphatemia  Anemia  + Liver mass    12/09/24: Stable renal indices. To continue current immuno suppressants. For MRI. Calcium levels slowly trending down. .  Monitor blood sugar levels. Insulin coverage as needed. Dietary restriction. Will lower lisinopril dose if BP remains low. Trend BP and titrate BP meds as needed.   Trend BP and titrate BP meds as needed. Hematology follow up. Will follow electrolytes and renal function trend.   12/10/24: Stable renal indices. Calcium levels now worsening again. Will dose steroids once blood sugar better controlled. Will redose aredia. MRI results pending.        CKD 3, h/o Kidney transplant ~2012, h/o Left Nephrectomy  Diabetes  Hypertension  Sepsis, UTI, Sacral osteomyelitis, Gram negative bacteremia  Hypokalemia  Hypercalcemia, Elevated 1,25 Vitamin D (ACEI level: WNL), R/o Lymphoma  Hypomagnesemia  Hypophosphatemia  Anemia  + Liver mass    12/09/24: Stable renal indices. To continue current immuno suppressants. For MRI. Calcium levels slowly trending down. .  Monitor blood sugar levels. Insulin coverage as needed. Dietary restriction. Will lower lisinopril dose if BP remains low. Trend BP and titrate BP meds as needed.   Trend BP and titrate BP meds as needed. Hematology follow up. Will follow electrolytes and renal function trend.   12/10/24: Stable renal indices. Calcium levels now worsening again. Will dose steroids once blood sugar better controlled. Pt received aredia 12/06/24. MRI results pending.

## 2024-12-10 NOTE — PROGRESS NOTE ADULT - SUBJECTIVE AND OBJECTIVE BOX
All interim records and events noted.    appear weak but comfortable in bed      MEDICATIONS  (STANDING):  amLODIPine   Tablet 10 milliGRAM(s) Oral daily  ascorbic acid 500 milliGRAM(s) Oral two times a day  azaTHIOprine 50 milliGRAM(s) Oral two times a day  buPROPion XL (24-Hour) . 300 milliGRAM(s) Oral daily  carvedilol 12.5 milliGRAM(s) Oral every 12 hours  cloNIDine 0.1 milliGRAM(s) Oral three times a day  dextrose 5%. 1000 milliLiter(s) (50 mL/Hr) IV Continuous <Continuous>  dextrose 5%. 1000 milliLiter(s) (100 mL/Hr) IV Continuous <Continuous>  dextrose 50% Injectable 25 Gram(s) IV Push once  dextrose 50% Injectable 12.5 Gram(s) IV Push once  dextrose 50% Injectable 25 Gram(s) IV Push once  ertapenem  IVPB      ertapenem  IVPB 1000 milliGRAM(s) IV Intermittent every 24 hours  escitalopram 10 milliGRAM(s) Oral <User Schedule>  ferrous    sulfate 325 milliGRAM(s) Oral two times a day  glucagon  Injectable 1 milliGRAM(s) IntraMuscular once  hydrALAZINE 100 milliGRAM(s) Oral three times a day  insulin glargine Injectable (LANTUS) 24 Unit(s) SubCutaneous at bedtime  insulin lispro (ADMELOG) corrective regimen sliding scale   SubCutaneous three times a day before meals  insulin lispro Injectable (ADMELOG) 5 Unit(s) SubCutaneous three times a day before meals  levothyroxine 88 MICROGram(s) Oral <User Schedule>  lisinopril 40 milliGRAM(s) Oral daily  mineral oil enema 133 milliLiter(s) Rectal once  multivitamin/minerals/iron Oral Solution (CENTRUM) 15 milliLiter(s) Oral daily  pantoprazole    Tablet 40 milliGRAM(s) Oral two times a day  polyethylene glycol 3350 17 Gram(s) Oral daily  pyridoxine 50 milliGRAM(s) Oral daily  rosuvastatin 10 milliGRAM(s) Oral at bedtime  senna 2 Tablet(s) Oral at bedtime  tacrolimus 2 milliGRAM(s) Oral daily  tacrolimus 1 milliGRAM(s) Oral at bedtime    MEDICATIONS  (PRN):  aluminum hydroxide/magnesium hydroxide/simethicone Suspension 30 milliLiter(s) Oral every 4 hours PRN Dyspepsia  dextrose Oral Gel 15 Gram(s) Oral once PRN Blood Glucose LESS THAN 70 milliGRAM(s)/deciliter  hydrALAZINE Injectable 10 milliGRAM(s) IV Push every 8 hours PRN SBP >180 and HR 60-70bpm  melatonin 3 milliGRAM(s) Oral at bedtime PRN Insomnia  metoprolol tartrate Injectable 5 milliGRAM(s) IV Push every 6 hours PRN SBP >170  ondansetron Injectable 4 milliGRAM(s) IV Push every 8 hours PRN Nausea and/or Vomiting      Vital Signs Last 24 Hrs  T(C): 37 (10 Dec 2024 12:25), Max: 37.9 (09 Dec 2024 19:57)  T(F): 98.6 (10 Dec 2024 12:25), Max: 100.2 (09 Dec 2024 19:57)  HR: 64 (10 Dec 2024 12:25) (64 - 74)  BP: 97/47 (10 Dec 2024 12:25) (97/47 - 190/67)  BP(mean): --  RR: 19 (10 Dec 2024 12:25) (18 - 19)  SpO2: 95% (10 Dec 2024 12:25) (95% - 97%)    Parameters below as of 10 Dec 2024 12:25  Patient On (Oxygen Delivery Method): room air        PHYSICAL EXAM  General: in no acute distress  Head: atraumatic, normocephalic  Neck: supple, trachea midline  CV: S1 S2, regular rate and rhythm  Lungs: clear to auscultation, no wheezes/rhonchi  Abdomen: soft, nontender, bowel sounds present  Skin: no significant increased ecchymosis/petechiae        LABS:             6.5    5.38  )-----------( 336      ( 12-10 @ 07:30 )             19.8                7.0    5.02  )-----------( 332      ( 12-09 @ 10:40 )             21.3                7.1    4.57  )-----------( 326      ( 12-08 @ 07:15 )             21.8       12-10    134[L]  |  100  |  32[H]  ----------------------------<  203[H]  4.5   |  28  |  0.97    Ca    11.4[H]      10 Dec 2024 07:30          RADIOLOGY & ADDITIONAL STUDIES:    IMPRESSION/RECOMMENDATIONS:

## 2024-12-10 NOTE — PROGRESS NOTE ADULT - ASSESSMENT
Patient is a 70yo M, PMH DM, HTN, HLD, h/o kidney transplant, hypothyroidism, presents to ED from [facility] for AMS. Patient is lethargic and unable to provide history at this time.    ESBL Bacteremia 2/2 Acute Cystitis  Sacral Wound/OM  Liver Mass w/ mets  Fevers  AMS 2/2 above  Febrile in the .5  UA positive for UTI  Flu/COVID/RSV negative  11/29 BCx ESBL E.coli , repeat negative  11/29 UCx   50,000 - 99,000 CFU/mL Escherichia coli    CT CAP w/ Small left pleural effusion with small loculated components  Right pelvic transplant kidney with mild fullness of the pelvic alyceal system. Stercoral proctitis.  Patchy sclerosis and osteolysis throughout the bilateral sacrum with pathologic fractures. There is soft tissue with stranding extending throughout the presacral and posterior extraperitoneal pelvic soft tissues.  There are a few bubbles of air/gas at the right sacral pathologic fracture, findings suggestive of infection/osteomyelitis.    MRI Again seen are comminuted bilateral sacral lona fractures with marked osseous edema and marked loss of normal T1 fatty marrow. Osseous edema with loss of normal T1 fatty marrow at thecaudal aspect of the left   posterior iliac bone adjacent to the sacroiliac joint. Previously seen fracture component is better visualized on CT. These findings could be secondary to extensive fracture deformities in an osteopenic patient   versus osteomyelitis if there was a history of a soft tissue ulcer extending to bone versus metastatic disease given the marked loss of T1 fatty marrow if there is a history of malignancy. Clinical correlation   with patient history is advised.    Reviewed w/ admitting attending, no sacral wound present. Suspect findings on sacral more related to mets from possible malignancy    Recommendations:   C/w ertapenem 1gm q24h x10 day course until 12/10, last day today  Trend temps/WBC  Monitor Hgb, transfusion prn protocol  Surgery following  Per Ortho no surgical intervention for fracture of sacrum    Additional care per primary team    Please call with any further questions.  Vanessa Saul M.D.  \[facility]\"" Division of Infectious Diseases [phone]  For after 5 P.M. and weekends, please call [phone]  Available on [employer] TEAMS

## 2024-12-10 NOTE — PROGRESS NOTE ADULT - SUBJECTIVE AND OBJECTIVE BOX
[facility] --  Tom Rahman Pannella, Patel, Savella, Goodger, Cohen  Office # [phone]    Follow Up:  Uncontrolled HTN    Subjective/Observations: Patient seen and examined. Patient awake, alert, resting in bed, confused, sleeping, arousable. Unable to obtain meaningful information 2/2 confusion. Tolerating room air.     REVIEW OF SYSTEMS: All other review of systems is negative unless indicated above  PAST MEDICAL & SURGICAL HISTORY:  Diabetes Mellitus Type II  Insulin pump ()      GERD (gastroesophageal reflux disease)      Depression      Hypothyroidism      Hyperlipidemia      Kidney transplanted        Hypertension      ANGELIKA (obstructive sleep apnea)      Peripheral neuropathy      Shoulder fracture  Left.      History of colonoscopy      S/P kidney transplant        S/P cholecystectomy      Retroperitoneal fibrosis  s/p surgery      AV fistula  Right arm      S/P right cataract extraction      S/P left cataract extraction      H/O unilateral nephrectomy  Left        MEDICATIONS  (STANDING):  amLODIPine   Tablet 10 milliGRAM(s) Oral daily  ascorbic acid 500 milliGRAM(s) Oral two times a day  azaTHIOprine 50 milliGRAM(s) Oral two times a day  buPROPion XL (24-Hour) . 300 milliGRAM(s) Oral daily  carvedilol 12.5 milliGRAM(s) Oral every 12 hours  cloNIDine 0.1 milliGRAM(s) Oral three times a day  dextrose 5%. 1000 milliLiter(s) (50 mL/Hr) IV Continuous <Continuous>  dextrose 5%. 1000 milliLiter(s) (100 mL/Hr) IV Continuous <Continuous>  dextrose 50% Injectable 25 Gram(s) IV Push once  dextrose 50% Injectable 12.5 Gram(s) IV Push once  dextrose 50% Injectable 25 Gram(s) IV Push once  ertapenem  IVPB      ertapenem  IVPB 1000 milliGRAM(s) IV Intermittent every 24 hours  escitalopram 10 milliGRAM(s) Oral <User Schedule>  ferrous    sulfate 325 milliGRAM(s) Oral two times a day  glucagon  Injectable 1 milliGRAM(s) IntraMuscular once  hydrALAZINE 100 milliGRAM(s) Oral three times a day  insulin glargine Injectable (LANTUS) 24 Unit(s) SubCutaneous at bedtime  insulin lispro (ADMELOG) corrective regimen sliding scale   SubCutaneous three times a day before meals  insulin lispro Injectable (ADMELOG) 5 Unit(s) SubCutaneous three times a day before meals  levothyroxine 88 MICROGram(s) Oral <User Schedule>  lisinopril 40 milliGRAM(s) Oral daily  mineral oil enema 133 milliLiter(s) Rectal once  multivitamin/minerals/iron Oral Solution (CENTRUM) 15 milliLiter(s) Oral daily  pantoprazole    Tablet 40 milliGRAM(s) Oral two times a day  polyethylene glycol 3350 17 Gram(s) Oral daily  pyridoxine 50 milliGRAM(s) Oral daily  rosuvastatin 10 milliGRAM(s) Oral at bedtime  senna 2 Tablet(s) Oral at bedtime  tacrolimus 2 milliGRAM(s) Oral daily  tacrolimus 1 milliGRAM(s) Oral at bedtime    MEDICATIONS  (PRN):  aluminum hydroxide/magnesium hydroxide/simethicone Suspension 30 milliLiter(s) Oral every 4 hours PRN Dyspepsia  dextrose Oral Gel 15 Gram(s) Oral once PRN Blood Glucose LESS THAN 70 milliGRAM(s)/deciliter  hydrALAZINE Injectable 10 milliGRAM(s) IV Push every 8 hours PRN SBP >180 and HR 60-70bpm  melatonin 3 milliGRAM(s) Oral at bedtime PRN Insomnia  metoprolol tartrate Injectable 5 milliGRAM(s) IV Push every 6 hours PRN SBP >170  ondansetron Injectable 4 milliGRAM(s) IV Push every 8 hours PRN Nausea and/or Vomiting    Allergies    No Known Allergies    Intolerances      Vital Signs Last 24 Hrs  T(C): 37.9 (10 Dec 2024 05:55), Max: 37.9 (09 Dec 2024 19:57)  T(F): 100.2 (10 Dec 2024 05:55), Max: 100.2 (09 Dec 2024 19:57)  HR: 67 (10 Dec 2024 04:49) (66 - 74)  BP: 154/63 (10 Dec 2024 04:49) (111/50 - 190/67)  BP(mean): --  RR: 18 (10 Dec 2024 04:49) (18 - 19)  SpO2: 96% (10 Dec 2024 04:49) (96% - 97%)    Parameters below as of 10 Dec 2024 04:49  Patient On (Oxygen Delivery Method): room air      I&O's Summary    09 Dec 2024 07:01  -  10 Dec 2024 07:00  --------------------------------------------------------  IN: 800 mL / OUT: 1300 mL / NET: -500 mL      Weight (kg): 92 ( @ 22:08)    TELE: Not on telemetry   PHYSICAL EXAM:  Constitutional: NAD, awake and alert  HEENT: Moist Mucous Membranes, Anicteric  Pulmonary: Non-labored, breath sounds are clear bilaterally, No wheezing, rales or rhonchi  Cardiovascular: Regular, S1 and S2, No murmurs, No rubs, gallops or clicks  Gastrointestinal:  soft, nontender, nondistended   Lymph: No peripheral edema. No lymphadenopathy.   Skin: No visible rashes or ulcers.  Psych: confused     LABS: All Labs Reviewed:                        7.0    5.02  )-----------( 332      ( 09 Dec 2024 10:40 )             21.3                         7.1    4.57  )-----------( 326      ( 08 Dec 2024 07:15 )             21.8                         7.5    4.46  )-----------( 365      ( 07 Dec 2024 12:14 )             23.6     09 Dec 2024 10:40    132    |  97     |  22     ----------------------------<  194    4.2     |  29     |  0.89   08 Dec 2024 07:15    132    |  100    |  19     ----------------------------<  263    4.5     |  27     |  0.76   07 Dec 2024 12:14    136    |  101    |  21     ----------------------------<  195    4.2     |  31     |  0.79     Ca    10.7       09 Dec 2024 10:40  Ca    10.6       08 Dec 2024 07:15  Ca    11.0       07 Dec 2024 12:14            12 Lead ECG:   Ventricular Rate 81 BPM    Atrial Rate 81 BPM    P-R Interval 148 ms    QRS Duration 104 ms    Q-T Interval 406 ms    QTC Calculation(Bazett) 471 ms    P Axis 44 degrees    R Axis 12 degrees    T Axis 53 degrees    Diagnosis Line Normal sinus rhythm  Normal ECG  When compared with ECG of 2024 07:11,  Nonspecific T wave abnormality, improved in Lateral leads  Confirmed by Kya Gonzalez ([id number]) on 2024 10:43:18 AM (24 @ 09:28)      EXAM:  ECHO TRANSTHORACIC COMP W DOPP      PROCEDURE DATE:  Dec 20 2018   .      INTERPRETATION:  REPORT:    TRANSTHORACIC ECHOCARDIOGRAM REPORT         Patient Name:   JAN CALVIN Patient Location: Inpatient  Medical Rec #:  [id number] Accession #:      [id number]  Account #:      [id number]          Height:           72.0 in 182.9 cm  YOB: 1955        Weight:           218.0 lb 98.88 kg  Patient Age:    63 years          BSA:              2.21 m²  Patient Gender: M              BP:               126/60 mmHg       Date of Exam:        2018 7:38:45 PM  Sonographer:         Fish Dee  Referring Physician: Patricio Huang MD    Procedure:     2D Echo/Doppler/Color Doppler Complete.  Indications:   Chest pain, unspecified - R07.9  Diagnosis:     Chest pain, unspecified - R07.9  Study Details: Technically difficult study. Study quality was adversely   affected                 due to patient obesity.         2D AND M-MODE MEASUREMENTS (normal ranges within parentheses):  Left                Normal    Aorta/Left           Normal  Ventricle:                    Atrium:  IVSd (2D):    1.02  (0.7-1.1) Left Atrium  3.44 cm (1.9-4.0)                cm              (2D):  LVPWd (2D):   0.97  (0.7-1.1) LA Volume    30.7                cm              Index        ml/m²  LVIDd (2D):   5.39  (3.4-5.7) Right Ventricle:                cm              TAPSE:           2.50 cm  LVIDs (2D):   3.31                cm  LV FS (2D):   38.5  (>25%)                %  LVEF (2D):   68 %  (>55%)  Relative Wall 0.36  (<0.42)  Thickness    LV SYSTOLIC FUNCTION BY 2D PLANIMETRY (MOD):  EF-A4C View: 70.9 % EF-A2C View: 73.3 % EF-Biplane: 72 %    LV DIASTOLIC FUNCTION:  MV Peak E: 0.66 m/s e', MV Gina: 0.09 m/s  MV Peak A: 0.70 m/s E/e' Ratio: 7.64  E/A Ratio: 0.95     Decel Time: 236 msec    SPECTRAL DOPPLER ANALYSIS (where applicable):  Mitral Valve:  MV P1/2 Time: 68.32 msec  MV Area, PHT: 3.22 cm²    Aortic Valve: AoV Max Christiano: 1.43 m/s AoV Peak P.2 mmHg AoV MeanP.5 mmHg    LVOT Vmax: 1.11 m/s LVOT VTI: 0.314 m LVOT Diameter: 2.38 cm    AoV Area, Vmax: 3.47 cm² AoV Area, VTI: 4.36 cm² AoV Area, Vmn:  Ao VTI: 0.322  Tricuspid Valve and PA/RV Systolic Pressure: TR Max Velocity:  RA   Pressure: 8 mmHg RVSP/PASP:    Pulmonic Valve:  PV Max Velocity: 1.08 m/s PV Max P.7 mmHg PV Mean PG:       PHYSICIAN INTERPRETATION:  Left Ventricle: Endocardial visualization was enhanced with intravenous   echo contrast. The left ventricular internal cavity size is normal. Left   ventricular wall thickness is normal.  Global LV systolic function was normal. Left ventricular ejection   fraction, by visual estimation, is 65 to 70%. Normal segmental left   ventricular systolic function. Spectral Doppler shows impaired relaxation   pattern of left ventricular myocardial filling (Grade I diastolic   dysfunction).  Right Ventricle: Normal right ventricular size and function. TV S' 0.2   m/s.  Left Atrium: The left atrium is normal in size.  Right Atrium: The right atrium is normal in size.  Pericardium: There is no evidence of pericardial effusion. There is a   significant pericardial fat pad present. There is a moderate pleural   effusion in the left lateral region.  Mitral Valve: The mitral valve is normalin structure. Mitral leaflet   mobility is normal. There is mild mitral annular calcification. No   evidence of mitral valve regurgitation is seen.  Tricuspid Valve: Structurally normal tricuspid valve, with normal leaflet   excursion.  Aortic Valve:The aortic valve was not well visualized. The aortic valve   is trileaflet. Peak Av velocity is 1.43 m/s, mean transaortic gradient   equals 4.5 mmHg, the calculated aortic valve area equals 4.36 cm² by the   continuity equation consistent with normally opening aortic valve. No   evidence of aortic valve regurgitation is seen.  Pulmonic Valve: The pulmonic valve was not well visualized.  Aorta: The aortic root is normal in size and structure. There is mild   aortic root calcification.  Pulmonary Artery: The pulmonary artery is not well seen.  Venous: The pulmonary veins appear normal. The inferior vena cava was   normal sized, with respiratory size variation less than 50%.       Summary:   1. Left ventricular ejection fraction, by visual estimation, is 65 to   70%.   2. Normal global left ventricular systolic function.   3. Normal left ventricular internal cavity size.   4. Spectral Doppler shows impaired relaxation pattern of left   ventricular myocardial filling (Grade I diastolic dysfunction).   5. Normal right ventricular size and function.   6. The left atrium is normal in size.   7. The right atrium is normal in size.   8. Moderate pleural effusion in the left lateral region.   9. There is a significant pericardial fat pad present.  10. There is no evidence of pericardial effusion.  11. Mild mitral annular calcification.  12. Structurally normal tricuspid valve, with normal leaflet excursion.  13. The pulmonary veins appear normal.  14. Endocardial visualization was enhancedwith intravenous echo contrast.  15. There is mild aortic root calcification.    [id number] Kuldeep Mitchell MD, Electronically signed on 2018 at 12:24:15 PM              *** Final ***                  KULDEEP MITCHELL M.D.  This document has been electronically signed. Dec 20 2018  7:38PM              [facility] --  Tom Rahman Pannella, Patel, Savella, Goodger, Cohen  Office # [phone]    Follow Up:  Uncontrolled HTN    Subjective/Observations: Patient seen and examined. Patient awake, alert, resting in bed, confused, sleeping, arousable. Unable to obtain meaningful information 2/2 confusion. Tolerating room air.     REVIEW OF SYSTEMS: All other review of systems is negative unless indicated above  PAST MEDICAL & SURGICAL HISTORY:  Diabetes Mellitus Type II  Insulin pump ()      GERD (gastroesophageal reflux disease)      Depression      Hypothyroidism      Hyperlipidemia      Kidney transplanted        Hypertension      ANGELIKA (obstructive sleep apnea)      Peripheral neuropathy      Shoulder fracture  Left.      History of colonoscopy      S/P kidney transplant        S/P cholecystectomy      Retroperitoneal fibrosis  s/p surgery      AV fistula  Right arm      S/P right cataract extraction      S/P left cataract extraction      H/O unilateral nephrectomy  Left        MEDICATIONS  (STANDING):  amLODIPine   Tablet 10 milliGRAM(s) Oral daily  ascorbic acid 500 milliGRAM(s) Oral two times a day  azaTHIOprine 50 milliGRAM(s) Oral two times a day  buPROPion XL (24-Hour) . 300 milliGRAM(s) Oral daily  carvedilol 12.5 milliGRAM(s) Oral every 12 hours  cloNIDine 0.1 milliGRAM(s) Oral three times a day  dextrose 5%. 1000 milliLiter(s) (50 mL/Hr) IV Continuous <Continuous>  dextrose 5%. 1000 milliLiter(s) (100 mL/Hr) IV Continuous <Continuous>  dextrose 50% Injectable 25 Gram(s) IV Push once  dextrose 50% Injectable 12.5 Gram(s) IV Push once  dextrose 50% Injectable 25 Gram(s) IV Push once  ertapenem  IVPB      ertapenem  IVPB 1000 milliGRAM(s) IV Intermittent every 24 hours  escitalopram 10 milliGRAM(s) Oral <User Schedule>  ferrous    sulfate 325 milliGRAM(s) Oral two times a day  glucagon  Injectable 1 milliGRAM(s) IntraMuscular once  hydrALAZINE 100 milliGRAM(s) Oral three times a day  insulin glargine Injectable (LANTUS) 24 Unit(s) SubCutaneous at bedtime  insulin lispro (ADMELOG) corrective regimen sliding scale   SubCutaneous three times a day before meals  insulin lispro Injectable (ADMELOG) 5 Unit(s) SubCutaneous three times a day before meals  levothyroxine 88 MICROGram(s) Oral <User Schedule>  lisinopril 40 milliGRAM(s) Oral daily  mineral oil enema 133 milliLiter(s) Rectal once  multivitamin/minerals/iron Oral Solution (CENTRUM) 15 milliLiter(s) Oral daily  pantoprazole    Tablet 40 milliGRAM(s) Oral two times a day  polyethylene glycol 3350 17 Gram(s) Oral daily  pyridoxine 50 milliGRAM(s) Oral daily  rosuvastatin 10 milliGRAM(s) Oral at bedtime  senna 2 Tablet(s) Oral at bedtime  tacrolimus 2 milliGRAM(s) Oral daily  tacrolimus 1 milliGRAM(s) Oral at bedtime    MEDICATIONS  (PRN):  aluminum hydroxide/magnesium hydroxide/simethicone Suspension 30 milliLiter(s) Oral every 4 hours PRN Dyspepsia  dextrose Oral Gel 15 Gram(s) Oral once PRN Blood Glucose LESS THAN 70 milliGRAM(s)/deciliter  hydrALAZINE Injectable 10 milliGRAM(s) IV Push every 8 hours PRN SBP >180 and HR 60-70bpm  melatonin 3 milliGRAM(s) Oral at bedtime PRN Insomnia  metoprolol tartrate Injectable 5 milliGRAM(s) IV Push every 6 hours PRN SBP >170  ondansetron Injectable 4 milliGRAM(s) IV Push every 8 hours PRN Nausea and/or Vomiting    Allergies    No Known Allergies    Intolerances      Vital Signs Last 24 Hrs  T(C): 37.9 (10 Dec 2024 05:55), Max: 37.9 (09 Dec 2024 19:57)  T(F): 100.2 (10 Dec 2024 05:55), Max: 100.2 (09 Dec 2024 19:57)  HR: 67 (10 Dec 2024 04:49) (66 - 74)  BP: 154/63 (10 Dec 2024 04:49) (111/50 - 190/67)  BP(mean): --  RR: 18 (10 Dec 2024 04:49) (18 - 19)  SpO2: 96% (10 Dec 2024 04:49) (96% - 97%)    Parameters below as of 10 Dec 2024 04:49  Patient On (Oxygen Delivery Method): room air      I&O's Summary    09 Dec 2024 07:01  -  10 Dec 2024 07:00  --------------------------------------------------------  IN: 800 mL / OUT: 1300 mL / NET: -500 mL      Weight (kg): 92 ( @ 22:08)    TELE:off  PHYSICAL EXAM:  Constitutional: NAD, awake and alert  HEENT: Moist Mucous Membranes, Anicteric  Pulmonary: Non-labored, breath sounds are clear bilaterally, No wheezing, rales or rhonchi  Cardiovascular: Regular, S1 and S2, No murmurs, No rubs, gallops or clicks  Gastrointestinal:  soft, nontender, nondistended   Lymph: No peripheral edema. No lymphadenopathy.   Skin: No visible rashes or ulcers.  Psych: confused     LABS: All Labs Reviewed:                        7.0    5.02  )-----------( 332      ( 09 Dec 2024 10:40 )             21.3                         7.1    4.57  )-----------( 326      ( 08 Dec 2024 07:15 )             21.8                         7.5    4.46  )-----------( 365      ( 07 Dec 2024 12:14 )             23.6     09 Dec 2024 10:40    132    |  97     |  22     ----------------------------<  194    4.2     |  29     |  0.89   08 Dec 2024 07:15    132    |  100    |  19     ----------------------------<  263    4.5     |  27     |  0.76   07 Dec 2024 12:14    136    |  101    |  21     ----------------------------<  195    4.2     |  31     |  0.79     Ca    10.7       09 Dec 2024 10:40  Ca    10.6       08 Dec 2024 07:15  Ca    11.0       07 Dec 2024 12:14            12 Lead ECG:   Ventricular Rate 81 BPM    Atrial Rate 81 BPM    P-R Interval 148 ms    QRS Duration 104 ms    Q-T Interval 406 ms    QTC Calculation(Bazett) 471 ms    P Axis 44 degrees    R Axis 12 degrees    T Axis 53 degrees    Diagnosis Line Normal sinus rhythm  Normal ECG  When compared with ECG of 2024 07:11,  Nonspecific T wave abnormality, improved in Lateral leads  Confirmed by Kya Gonzalez ([id number]) on 2024 10:43:18 AM (24 @ 09:28)      EXAM:  ECHO TRANSTHORACIC COMP W DOPP      PROCEDURE DATE:  Dec 20 2018   .      INTERPRETATION:  REPORT:    TRANSTHORACIC ECHOCARDIOGRAM REPORT         Patient Name:   JAN CALVIN Patient Location: Inpatient  Medical Rec #:  [id number] Accession #:      [id number]  Account #:      [id number]          Height:           72.0 in 182.9 cm  YOB: 1955        Weight:           218.0 lb 98.88 kg  Patient Age:    63 years          BSA:              2.21 m²  Patient Gender: M              BP:               126/60 mmHg       Date of Exam:        2018 7:38:45 PM  Sonographer:         Fish Dee  Referring Physician: Patricio Huang MD    Procedure:     2D Echo/Doppler/Color Doppler Complete.  Indications:   Chest pain, unspecified - R07.9  Diagnosis:     Chest pain, unspecified - R07.9  Study Details: Technically difficult study. Study quality was adversely   affected                 due to patient obesity.         2D AND M-MODE MEASUREMENTS (normal ranges within parentheses):  Left                Normal    Aorta/Left           Normal  Ventricle:                    Atrium:  IVSd (2D):    1.02  (0.7-1.1) Left Atrium  3.44 cm (1.9-4.0)                cm              (2D):  LVPWd (2D):   0.97  (0.7-1.1) LA Volume    30.7                cm              Index        ml/m²  LVIDd (2D):   5.39  (3.4-5.7) Right Ventricle:                cm              TAPSE:           2.50 cm  LVIDs (2D):   3.31                cm  LV FS (2D):   38.5  (>25%)                %  LVEF (2D):   68 %  (>55%)  Relative Wall 0.36  (<0.42)  Thickness    LV SYSTOLIC FUNCTION BY 2D PLANIMETRY (MOD):  EF-A4C View: 70.9 % EF-A2C View: 73.3 % EF-Biplane: 72 %    LV DIASTOLIC FUNCTION:  MV Peak E: 0.66 m/s e', MV Gina: 0.09 m/s  MV Peak A: 0.70 m/s E/e' Ratio: 7.64  E/A Ratio: 0.95     Decel Time: 236 msec    SPECTRAL DOPPLER ANALYSIS (where applicable):  Mitral Valve:  MV P1/2 Time: 68.32 msec  MV Area, PHT: 3.22 cm²    Aortic Valve: AoV Max Christiano: 1.43 m/s AoV Peak P.2 mmHg AoV MeanP.5 mmHg    LVOT Vmax: 1.11 m/s LVOT VTI: 0.314 m LVOT Diameter: 2.38 cm    AoV Area, Vmax: 3.47 cm² AoV Area, VTI: 4.36 cm² AoV Area, Vmn:  Ao VTI: 0.322  Tricuspid Valve and PA/RV Systolic Pressure: TR Max Velocity:  RA   Pressure: 8 mmHg RVSP/PASP:    Pulmonic Valve:  PV Max Velocity: 1.08 m/s PV Max P.7 mmHg PV Mean PG:       PHYSICIAN INTERPRETATION:  Left Ventricle: Endocardial visualization was enhanced with intravenous   echo contrast. The left ventricular internal cavity size is normal. Left   ventricular wall thickness is normal.  Global LV systolic function was normal. Left ventricular ejection   fraction, by visual estimation, is 65 to 70%. Normal segmental left   ventricular systolic function. Spectral Doppler shows impaired relaxation   pattern of left ventricular myocardial filling (Grade I diastolic   dysfunction).  Right Ventricle: Normal right ventricular size and function. TV S' 0.2   m/s.  Left Atrium: The left atrium is normal in size.  Right Atrium: The right atrium is normal in size.  Pericardium: There is no evidence of pericardial effusion. There is a   significant pericardial fat pad present. There is a moderate pleural   effusion in the left lateral region.  Mitral Valve: The mitral valve is normalin structure. Mitral leaflet   mobility is normal. There is mild mitral annular calcification. No   evidence of mitral valve regurgitation is seen.  Tricuspid Valve: Structurally normal tricuspid valve, with normal leaflet   excursion.  Aortic Valve:The aortic valve was not well visualized. The aortic valve   is trileaflet. Peak Av velocity is 1.43 m/s, mean transaortic gradient   equals 4.5 mmHg, the calculated aortic valve area equals 4.36 cm² by the   continuity equation consistent with normally opening aortic valve. No   evidence of aortic valve regurgitation is seen.  Pulmonic Valve: The pulmonic valve was not well visualized.  Aorta: The aortic root is normal in size and structure. There is mild   aortic root calcification.  Pulmonary Artery: The pulmonary artery is not well seen.  Venous: The pulmonary veins appear normal. The inferior vena cava was   normal sized, with respiratory size variation less than 50%.       Summary:   1. Left ventricular ejection fraction, by visual estimation, is 65 to   70%.   2. Normal global left ventricular systolic function.   3. Normal left ventricular internal cavity size.   4. Spectral Doppler shows impaired relaxation pattern of left   ventricular myocardial filling (Grade I diastolic dysfunction).   5. Normal right ventricular size and function.   6. The left atrium is normal in size.   7. The right atrium is normal in size.   8. Moderate pleural effusion in the left lateral region.   9. There is a significant pericardial fat pad present.  10. There is no evidence of pericardial effusion.  11. Mild mitral annular calcification.  12. Structurally normal tricuspid valve, with normal leaflet excursion.  13. The pulmonary veins appear normal.  14. Endocardial visualization was enhancedwith intravenous echo contrast.  15. There is mild aortic root calcification.    [id number] Kuldeep Mitchell MD, Electronically signed on 2018 at 12:24:15 PM              *** Final ***                  KULDEEP MITCHELL M.D.  This document has been electronically signed. Dec 20 2018  7:38PM

## 2024-12-10 NOTE — PROGRESS NOTE ADULT - ASSESSMENT
in progress    70yo M, PMH DM, HTN, HLD, h/o kidney transplant, hypothyroidism, presents to ED from [facility] for AMS, found to be febrile with positive UA. 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 Cardiology called for pericardial effusion.     Uncontrolled HTN/Pericardial Effusion  - CT chest: Small left pleural effusion with small loculated components, Small to moderate pericardial effusion  - TTE: EF 60%, trace pericardial effusion adjacent ot the posterior LV.  No significant valvular disease  - No signs of tamponade on examination    - No evidence of any meaningful volume overload   - He appears to be dry on exam. Encourage fluid/po intake, or IVF    - EKG with nonspecific TWI anteriorly, repeat unchanged  - No evidence of any active ischemia   - Continue statin   - now off ASA in setting of persistent anemia (neg FOBT), with Hgb 6-7    - BP labile and uncontrolled 130-190's systolics  - Continue max Norvasc 10, Hydralazine 100 TID and Lisinopril 40 dose   - Continue Clonidine increase to 0.1 mg PO TID   - Continue coreg 12.5mg bid ( would up titate next) if bp remains uncontrolled     - Heme and GI following for anemia  - Ortho following for pathological sacral Fx with possible OM.   - No acute orthopedic surgical intervention at this time.    - dc planning per primary team  - Monitor and replete lytes, keep K>4, Mg>2.  - Will continue to follow.    Aaliyah Resendez NP  Nurse Practitioner- Cardiology   Call TEAMS   68yo M, PMH DM, HTN, HLD, h/o kidney transplant, hypothyroidism, presents to ED from [facility] for AMS, found to be febrile with positive UA. 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 Cardiology called for pericardial effusion.     Uncontrolled HTN/Pericardial Effusion  - CT chest: Small left pleural effusion with small loculated components, Small to moderate pericardial effusion  - TTE: EF 60%, trace pericardial effusion adjacent ot the posterior LV.  No significant valvular disease  - No signs of tamponade on examination    - No evidence of any meaningful volume overload   - He appears to be dry on exam. Encourage fluid/po intake, or IVF    - EKG with nonspecific TWI anteriorly, repeat unchanged  - No evidence of any active ischemia   - Continue statin   - now off ASA in setting of persistent anemia (neg FOBT), with Hgb 6-7    - BP labile and uncontrolled 130-190's systolics  - Continue max Norvasc 10, Hydralazine 100 TID and Lisinopril 40 dose   - Continue Clonidine increase to 0.1 mg PO TID   - Continue coreg 12.5mg bid ( would up titate next) if bp remains uncontrolled     - Heme and GI following for anemia  - Ortho following for pathological sacral Fx with possible OM.   - No acute orthopedic surgical intervention at this time.    - dc planning per primary team  - Monitor and replete lytes, keep K>4, Mg>2.  - Will continue to follow.    Aaliyah Resendez NP  Nurse Practitioner- Cardiology   Call TEAMS   70yo M, PMH DM, HTN, HLD, h/o kidney transplant, hypothyroidism, presents to ED from [facility] for AMS, found to be febrile with positive UA. 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 Cardiology called for pericardial effusion.     Uncontrolled HTN/Pericardial Effusion  - CT chest: Small left pleural effusion with small loculated components, Small to moderate pericardial effusion  - TTE: EF 60%, trace pericardial effusion adjacent ot the posterior LV.  No significant valvular disease  - No signs of tamponade on examination    - No evidence of any meaningful volume overload   - He appears to be dry on exam. Encourage fluid/po intake, or IVF    - EKG with nonspecific TWI anteriorly, repeat unchanged  - No evidence of any active ischemia   - Continue statin   - now off ASA in setting of persistent anemia (neg FOBT), with Hgb 6-7. hgb dropped this am, 6.5  - Heme and GI following for anemia    - BP labile and uncontrolled 130-190's systolics  - Continue max Norvasc 10, Hydralazine 100 TID and Lisinopril 40 dose   - Continue Clonidine increase to 0.1 mg PO TID   - Continue coreg 12.5mg bid ( would up titate next) if bp remains uncontrolled     - Ortho following for pathological sacral Fx with possible OM.   - No acute orthopedic surgical intervention at this time.    - Monitor and replete lytes, keep K>4, Mg>2.  - Will continue to follow.    Aaliyah Resendez NP  Nurse Practitioner- Cardiology   Call TEAMS

## 2024-12-10 NOTE — PROGRESS NOTE ADULT - ASSESSMENT
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

## 2024-12-10 NOTE — PROGRESS NOTE ADULT - ASSESSMENT
Patient is a 68yo M, PMH DM, HTN, HLD, h/o kidney transplant, hypothyroidism, presents to ED from [facility] for AMS likely  acute  metabolic encephalopathy       AMS 2/2 Sepsis 2/2 multiple infections (UTI, sacral infection, possible osteomyelitis), E coli bacteremia   acute  metabolic encephalopathy   Sepsis 2/2 ESBL Ecoli UTI and bacteremia. sensitive to ertapenem.  -C/W Meropenum 1gm q24h x10 day course until 12/10 d/w  ID Dr. Saul with  midline   -Tylenol PRN pain and fever- diet as tolerated   -aspiration and fall risk - Continue Senna, Miralax, PRN dulcolax suppositories   -MR pelvis/sacrum to eval for osteomyelitis-  Again seen are comminuted bilateral sacral lona fractures with marked osseous edema and marked loss of normal T1 fatty marrow. Osseous edema with loss of normal T1 fatty marrow at the caudal aspect of the left posterior iliac bone adjacent to the sacroiliac joint. Previously seen fracture component is better visualized on CT. These findings could be secondary to extensive fracture deformities in an osteopenic patient versus osteomyelitis if there was a history of a soft tissue ulcer extending to bone versus metastatic disease given the marked loss of T1 fatty marrow if there is a history of malignancy. Clinical correlation with patient history is advised.  - Per Ortho no surgical intervention for fracture of sacrum  / needs dolores     Acute on Chronic anemia   - Likely due to infection,  and CKD- No signs of acute  bleeding noted   - Iron22/ sat low    iron deficiency with chr anemia  +  - HgB 6.5  - ordered 1unit of pRBC today,  given hx of renal transplant will use irradiated PRBC   - Hem Dr. Parson following     Hepatic lesion  - CT abd with 5.5cm lesion on R hepatic lobe also noticed  retroperitoneal  fibrosis  and spleen mass   - histopath not fully reported by  [facility] in sept /2024 paper work   - hem/onc  DR PARSON / and nephro dr vizcarra   would like to  repeat   liver biopsy by ir  after mri abd  - MRI Abd done, pending read  - pending films review by IR for possible repeat biopsy    Diabetes Mellitus / hyperglycemia  - A1c 7.4  - Pre meal Insulin  5 unit tid,   increased Lantus to 24 u QHS  - ISS  - carb controlled diet    HTN  - Continue Lisinopril 40 mg  daily   - Continue Hydralazine 100mg TID with hold parameters  - Continue Coreg, switched from Metoprolol - 12.5 mg po bid  - Continue Amlodipine 10mg daily -  - added on clonidine 0.1  mg tid   - fu bp closely     HLD  Continue Crestor 10mg daily    s/p Kidney transplant/CKD 3  - Continue Tacrolimus 2mg daily  - Continue Tacrolimus 1mg QHS  - Continue Azathioprine 50mg BID  - Nephrology consulted dr vizcarra     Hypothyroidism  - Continue Levothyroxine 88mcg QAM    Hypercalcemia  - possible  sec to  underlying  lymphoproliferative disorder  - sec to vit D  s/p  calcitonin   s/p   Aredia   - Nephro following    Depression  Continue Escitalopram 10mg TID  Continue Bupropion 300mg daily        DVT ppx: Hold AC due to anemia and risk of bleeding           Dispo: JOCELYNN  dolores   Patient is a 70yo M, PMH DM, HTN, HLD, h/o kidney transplant, hypothyroidism, presents to ED from [facility] for AMS likely  acute  metabolic encephalopathy       AMS 2/2 Sepsis 2/2 multiple infections (UTI, sacral infection, possible osteomyelitis), E coli bacteremia   acute  metabolic encephalopathy   Sepsis 2/2 ESBL Ecoli UTI and bacteremia. sensitive to ertapenem.  -C/W Meropenum 1gm q24h x10 day course until 12/10 d/w  ID Dr. Saul with  midline   -Tylenol PRN pain and fever- diet as tolerated   -aspiration and fall risk - Continue Senna, Miralax, PRN dulcolax suppositories   -MR pelvis/sacrum to eval for osteomyelitis-  Again seen are comminuted bilateral sacral lona fractures with marked osseous edema and marked loss of normal T1 fatty marrow. Osseous edema with loss of normal T1 fatty marrow at the caudal aspect of the left posterior iliac bone adjacent to the sacroiliac joint. Previously seen fracture component is better visualized on CT. These findings could be secondary to extensive fracture deformities in an osteopenic patient versus osteomyelitis if there was a history of a soft tissue ulcer extending to bone versus metastatic disease given the marked loss of T1 fatty marrow if there is a history of malignancy. Clinical correlation with patient history is advised.  - Per Ortho no surgical intervention for fracture of sacrum  / needs dolores     Acute on Chronic anemia   - Likely due to infection,  and CKD- No signs of acute  bleeding noted   - Iron22/ sat low    iron deficiency with chr anemia  +  - HgB 6.5  - ordered 1unit of pRBC today,  given hx of renal transplant will use irradiated PRBC   - Hem Dr. Parson following     Hepatic lesion  - CT abd with 5.5cm lesion on R hepatic lobe also noticed  retroperitoneal  fibrosis  and spleen mass   - histopath not fully reported by  [facility] in sept /2024 paper work   - hem/onc  DR PARSON / and nephro dr vizcarra   would like to  repeat   liver biopsy by ir  after mri abd  - MRI Abd done, pending read  - pending films review by IR for possible repeat biopsy    Diabetes Mellitus / hyperglycemia  - A1c 7.4  - Pre meal Insulin  5 unit tid,   increased Lantus to 24 u QHS  - ISS  - carb controlled diet    HTN  - Continue Lisinopril 40 mg  daily   - Continue Hydralazine 100mg TID with hold parameters  - Continue Coreg, switched from Metoprolol - 12.5 mg po bid  - Continue Amlodipine 10mg daily -  - added on clonidine 0.1  mg tid   - fu bp closely     HLD  Continue Crestor 10mg daily    s/p Kidney transplant/CKD 3  - Continue Tacrolimus 2mg daily  - Continue Tacrolimus 1mg QHS  - Continue Azathioprine 50mg BID  - Nephrology consulted dr vizcarra     Hypothyroidism  - Continue Levothyroxine 88mcg QAM    Hypercalcemia  - possible  sec to  underlying  lymphoproliferative disorder  - sec to vit D  s/p  calcitonin   s/p   Aredia   - Calcitonin q 12 hrs X 3 doses  - Nephro following    Depression  Continue Escitalopram 10mg TID  Continue Bupropion 300mg daily        DVT ppx: Hold AC due to anemia and risk of bleeding           Dispo: JOCELYNN  dolores   Patient is a 68yo M, PMH DM, HTN, HLD, h/o kidney transplant, hypothyroidism, presents to ED from [facility] for AMS likely  acute  metabolic encephalopathy       AMS 2/2 Sepsis 2/2 multiple infections (UTI, sacral infection, possible osteomyelitis), E coli bacteremia   acute  metabolic encephalopathy   Sepsis 2/2 ESBL Ecoli UTI and bacteremia. sensitive to ertapenem.  -C/W Meropenum 1gm q24h x10 day course until 12/10 d/w  ID Dr. Saul with  midline   -Tylenol PRN pain and fever- diet as tolerated   -aspiration and fall risk - Continue Senna, Miralax, PRN dulcolax suppositories   -MR pelvis/sacrum to eval for osteomyelitis-  Again seen are comminuted bilateral sacral lona fractures with marked osseous edema and marked loss of normal T1 fatty marrow. Osseous edema with loss of normal T1 fatty marrow at the caudal aspect of the left posterior iliac bone adjacent to the sacroiliac joint. Previously seen fracture component is better visualized on CT. These findings could be secondary to extensive fracture deformities in an osteopenic patient versus osteomyelitis if there was a history of a soft tissue ulcer extending to bone versus metastatic disease given the marked loss of T1 fatty marrow if there is a history of malignancy. Clinical correlation with patient history is advised.  - Per Ortho no surgical intervention for fracture of sacrum  / needs dolores     Acute on Chronic anemia   - Likely due to infection,  and CKD- No signs of acute  bleeding noted   - Iron22/ sat low    iron deficiency with chr anemia  +  - HgB 6.5  - ordered 1unit of pRBC today,  given hx of renal transplant will use irradiated PRBC   - Hem Dr. Sal following     Hepatic lesion  - CT abd with 5.5cm lesion on R hepatic lobe also noticed  retroperitoneal  fibrosis  and spleen mass   - histopath not fully reported by  [facility] in sept /2024 paper work   - hem/onc  DR SAL / and nephro dr vizcarra   would like to  repeat   liver biopsy by ir  after mri abd  - MRI Abd done, pending read  - pending films review by IR for possible repeat biopsy    Diabetes Mellitus / hyperglycemia  - A1c 7.4  - Pre meal Insulin  5 unit tid,   increased Lantus to 24 u QHS  - ISS  - carb controlled diet    HTN  - Continue Lisinopril 40 mg  daily   - Continue Hydralazine 100mg TID with hold parameters  - Continue Coreg, switched from Metoprolol - 12.5 mg po bid  - Continue Amlodipine 10mg daily -  - added on clonidine 0.1  mg tid   - fu bp closely     HLD  Continue Crestor 10mg daily    s/p Kidney transplant/CKD 3  - Continue Tacrolimus 2mg daily  - Continue Tacrolimus 1mg QHS  - Continue Azathioprine 50mg BID  - Nephrology consulted dr vizcarra     Hypothyroidism  - Continue Levothyroxine 88mcg QAM    Hypercalcemia  - possible  sec to  underlying  lymphoproliferative disorder  - sec to vit D  s/p  calcitonin   s/p   Aredia   - Calcitonin q 12 hrs X 3 doses  - Nephro following    Depression  Continue Escitalopram 10mg TID  Continue Bupropion 300mg daily        DVT ppx: Hold AC due to anemia and risk of bleeding         wife updated. Please Update wife, Ludmila Gann [phone] daily  Dispo: JOCELYNN bernal

## 2024-12-10 NOTE — PROGRESS NOTE ADULT - ASSESSMENT
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 I discussed the care with the patient.  Differential diagnosis and plan of care discussed with patient after the evaluation.  35 minutes spent on total encounter of which more than fifty percent of the encounter was spent counseling and/or coordinating care by the attending physician.

## 2024-12-10 NOTE — PROGRESS NOTE ADULT - SUBJECTIVE AND OBJECTIVE BOX
[facility], Division of Infectious Diseases  WANG Mccoy Y. Patel, S. Shah, G. [facility]  [phone]    Name: JAN CALVIN  Age: 69y  Gender: Male  MRN: [id number]    Interval History:  No acute overnight events.   Notes reviewed    Antibiotics:  ertapenem  IVPB      ertapenem  IVPB 1000 milliGRAM(s) IV Intermittent every 24 hours      Medications:  aluminum hydroxide/magnesium hydroxide/simethicone Suspension 30 milliLiter(s) Oral every 4 hours PRN  amLODIPine   Tablet 10 milliGRAM(s) Oral daily  ascorbic acid 500 milliGRAM(s) Oral two times a day  azaTHIOprine 50 milliGRAM(s) Oral two times a day  buPROPion XL (24-Hour) . 300 milliGRAM(s) Oral daily  calcitonin Injectable 370 International Unit(s) IntraMuscular every 12 hours  carvedilol 12.5 milliGRAM(s) Oral every 12 hours  cloNIDine 0.1 milliGRAM(s) Oral three times a day  dextrose 5%. 1000 milliLiter(s) IV Continuous <Continuous>  dextrose 5%. 1000 milliLiter(s) IV Continuous <Continuous>  dextrose 50% Injectable 25 Gram(s) IV Push once  dextrose 50% Injectable 12.5 Gram(s) IV Push once  dextrose 50% Injectable 25 Gram(s) IV Push once  dextrose Oral Gel 15 Gram(s) Oral once PRN  ertapenem  IVPB      ertapenem  IVPB 1000 milliGRAM(s) IV Intermittent every 24 hours  escitalopram 10 milliGRAM(s) Oral <User Schedule>  ferrous    sulfate 325 milliGRAM(s) Oral two times a day  glucagon  Injectable 1 milliGRAM(s) IntraMuscular once  hydrALAZINE 100 milliGRAM(s) Oral three times a day  hydrALAZINE Injectable 10 milliGRAM(s) IV Push every 8 hours PRN  insulin glargine Injectable (LANTUS) 24 Unit(s) SubCutaneous at bedtime  insulin lispro (ADMELOG) corrective regimen sliding scale   SubCutaneous three times a day before meals  insulin lispro Injectable (ADMELOG) 5 Unit(s) SubCutaneous three times a day before meals  levothyroxine 88 MICROGram(s) Oral <User Schedule>  lisinopril 40 milliGRAM(s) Oral daily  melatonin 3 milliGRAM(s) Oral at bedtime PRN  metoprolol tartrate Injectable 5 milliGRAM(s) IV Push every 6 hours PRN  mineral oil enema 133 milliLiter(s) Rectal once  multivitamin/minerals/iron Oral Solution (CENTRUM) 15 milliLiter(s) Oral daily  ondansetron Injectable 4 milliGRAM(s) IV Push every 8 hours PRN  pantoprazole    Tablet 40 milliGRAM(s) Oral two times a day  polyethylene glycol 3350 17 Gram(s) Oral daily  pyridoxine 50 milliGRAM(s) Oral daily  rosuvastatin 10 milliGRAM(s) Oral at bedtime  senna 2 Tablet(s) Oral at bedtime  tacrolimus 2 milliGRAM(s) Oral daily  tacrolimus 1 milliGRAM(s) Oral at bedtime      Review of Systems:  A 10-point review of systems was obtained.   Review of systems otherwise negative except as previously noted.    Allergies: No Known Allergies    For details regarding the patient's past medical history, social history, family history, and other miscellaneous elements, please refer the initial infectious diseases consultation and/or the admitting history and physical examination for this admission.    Objective:  Vitals:   T(C): 37 (12-10-24 @ 12:25), Max: 37.9 (12-09-24 @ 19:57)  HR: 64 (12-10-24 @ 12:25) (64 - 74)  BP: 97/47 (12-10-24 @ 12:25) (97/47 - 190/67)  RR: 19 (12-10-24 @ 12:25) (18 - 19)  SpO2: 95% (12-10-24 @ 12:25) (95% - 97%)    Physical Examination:  General: no acute distress  HEENT: NC/AT, EOMI,  Cardio: S1, S2 heard, RRR, no murmurs  Resp: breath sounds heard bilaterally, no rales, wheezes or rhonchi  Abd: soft, NT, ND  Ext: no edema or cyanosis  Skin: warm, dry, no visible rash      Laboratory Studies:  CBC:                       6.5    5.38  )-----------( 336      ( 10 Dec 2024 07:30 )             19.8     CMP: 12-10    134[L]  |  100  |  32[H]  ----------------------------<  203[H]  4.5   |  28  |  0.97    Ca    11.4[H]      10 Dec 2024 07:30        Urinalysis Basic - ( 10 Dec 2024 07:30 )    Color: x / Appearance: x / SG: x / pH: x  Gluc: 203 mg/dL / Ketone: x  / Bili: x / Urobili: x   Blood: x / Protein: x / Nitrite: x   Leuk Esterase: x / RBC: x / WBC x   Sq Epi: x / Non Sq Epi: x / Bacteria: x        Microbiology: reviewed    Culture - Blood (collected 11-30-24 @ 07:05)  Source: .Blood BLOOD  Final Report (12-05-24 @ 13:00):    No growth at 5 days    Culture - Blood (collected 11-30-24 @ 07:00)  Source: .Blood BLOOD  Final Report (12-05-24 @ 13:00):    No growth at 5 days    Culture - Urine (collected 11-29-24 @ 11:00)  Source: Catheterized  Final Report (12-01-24 @ 10:17):    50,000 - 99,000 CFU/mL Escherichia coli ESBL  Organism: Escherichia coli ESBL (12-01-24 @ 10:17)  Organism: Escherichia coli ESBL (12-01-24 @ 10:17)      Method Type: CHRIST      -  Ampicillin: R >16 These ampicillin results predict results for amoxicillin      -  Ampicillin/Sulbactam: I 16/8      -  Aztreonam: R >16      -  Cefazolin: R >16 For uncomplicated UTI with K. pneumoniae, E. coli, or P. mirablis: CHRIST <=16 is sensitive and CHRIST >=32 is resistant. This also predicts results for oral agents cefaclor, cefdinir, cefpodoxime, cefprozil, cefuroxime axetil, cephalexin and locarbef for uncomplicated UTI. Note that some isolates may be susceptible to these agents while testing resistant to cefazolin.      -  Cefepime: R >16      -  Ceftriaxone: R >32      -  Cefuroxime: R >16      -  Ciprofloxacin: R >2      -  Ertapenem: S <=0.5      -  Gentamicin: R >8      -  Imipenem: S <=1      -  Levofloxacin: R 4      -  Meropenem: S <=1      -  Nitrofurantoin: S <=32 Should not be used to treat pyelonephritis      -  Piperacillin/Tazobactam: S <=8      -  Tobramycin: R >8      -  Trimethoprim/Sulfamethoxazole: R >2/38    Urinalysis with Rflx Culture (collected 11-29-24 @ 11:00)    Culture - Blood (collected 11-29-24 @ 07:15)  Source: .Blood BLOOD  Gram Stain (11-29-24 @ 21:31):    Growth in aerobic bottle: Gram Negative Rods    Growth in anaerobic bottle: Gram Negative Rods  Final Report (12-01-24 @ 17:40):    Growth in aerobic and anaerobic bottles: Escherichia coli ESBL  Organism: Escherichia coli ESBL (12-01-24 @ 17:40)  Organism: Escherichia coli ESBL (12-01-24 @ 17:40)      Method Type: CHRIST      -  Ampicillin: R >16 These ampicillin results predict results for amoxicillin      -  Ampicillin/Sulbactam: R >16/8      -  Aztreonam: R >16      -  Cefazolin: R >16      -  Cefepime: R >16      -  Ceftriaxone: R >32      -  Ciprofloxacin: R >2      -  Ertapenem: S <=0.5      -  Gentamicin: R >8      -  Imipenem: S <=1      -  Levofloxacin: R 4      -  Meropenem: S <=1      -  Piperacillin/Tazobactam: R <=8      -  Tobramycin: R >8      -  Trimethoprim/Sulfamethoxazole: R >2/38    Culture - Blood (collected 11-29-24 @ 07:10)  Source: .Blood BLOOD  Gram Stain (11-29-24 @ 22:18):    Growth in anaerobic bottle: Gram Negative Rods    Growth in aerobic bottle: Gram Negative Rods  Final Report (12-01-24 @ 17:41):    Growth in aerobic and anaerobic bottles: Escherichia coli ESBL    Direct identification is available within approximately 3-5    hours either by Blood Panel Multiplexed PCR or Direct    MALDI-TOF. Details: [email or web address]  Organism: Blood Culture PCR (12-01-24 @ 17:41)  Organism: Blood Culture PCR (12-01-24 @ 17:41)      Method Type: PCR      -  Escherichia coli: Detec      -  ESBL: Detec      -  CTX-M Resistance Marker: Detec          Radiology: reviewed

## 2024-12-10 NOTE — PROGRESS NOTE ADULT - SUBJECTIVE AND OBJECTIVE BOX
PROGRESS NOTE:     Patient is a 69y old  Male who presents with a chief complaint of AMS (10 Dec 2024 13:40)          SUBJECTIVE & OBJECTIVE:   Pt seen and examined at bedside in AM    no overnight events.       REVIEW OF SYSTEMS: remaining ROS negative     PHYSICAL EXAM:  VITALS:  Vital Signs Last 24 Hrs  T(C): 37 (10 Dec 2024 12:25), Max: 37.9 (09 Dec 2024 19:57)  T(F): 98.6 (10 Dec 2024 12:25), Max: 100.2 (09 Dec 2024 19:57)  HR: 64 (10 Dec 2024 12:25) (64 - 74)  BP: 97/47 (10 Dec 2024 12:25) (97/47 - 190/67)  BP(mean): --  RR: 19 (10 Dec 2024 12:25) (18 - 19)  SpO2: 95% (10 Dec 2024 12:25) (95% - 97%)    Parameters below as of 10 Dec 2024 12:25  Patient On (Oxygen Delivery Method): room air          GENERAL: NAD,  no increased WOB  HEAD:  Atraumatic, Normocephalic  EYES: EOMI, conjunctiva and sclera clear  ENMT: Moist mucous membranes  NECK: Supple, No JVD  NERVOUS SYSTEM:  Alert   CHEST/LUNG: Clear to auscultation bilaterally; No rales, rhonchi, wheezing  HEART: Regular rate and rhythm  ABDOMEN: Soft, Nontender, Nondistended; Bowel sounds present  EXTREMITIES:   No clubbing, cyanosis      MEDICATIONS  (STANDING):  amLODIPine   Tablet 10 milliGRAM(s) Oral daily  ascorbic acid 500 milliGRAM(s) Oral two times a day  azaTHIOprine 50 milliGRAM(s) Oral two times a day  buPROPion XL (24-Hour) . 300 milliGRAM(s) Oral daily  calcitonin Injectable 370 International Unit(s) IntraMuscular every 12 hours  carvedilol 12.5 milliGRAM(s) Oral every 12 hours  cloNIDine 0.1 milliGRAM(s) Oral three times a day  dextrose 5%. 1000 milliLiter(s) (100 mL/Hr) IV Continuous <Continuous>  dextrose 5%. 1000 milliLiter(s) (50 mL/Hr) IV Continuous <Continuous>  dextrose 50% Injectable 25 Gram(s) IV Push once  dextrose 50% Injectable 12.5 Gram(s) IV Push once  dextrose 50% Injectable 25 Gram(s) IV Push once  escitalopram 10 milliGRAM(s) Oral <User Schedule>  ferrous    sulfate 325 milliGRAM(s) Oral two times a day  glucagon  Injectable 1 milliGRAM(s) IntraMuscular once  hydrALAZINE 100 milliGRAM(s) Oral three times a day  insulin glargine Injectable (LANTUS) 24 Unit(s) SubCutaneous at bedtime  insulin lispro (ADMELOG) corrective regimen sliding scale   SubCutaneous three times a day before meals  insulin lispro Injectable (ADMELOG) 5 Unit(s) SubCutaneous three times a day before meals  levothyroxine 88 MICROGram(s) Oral <User Schedule>  lisinopril 40 milliGRAM(s) Oral daily  mineral oil enema 133 milliLiter(s) Rectal once  multivitamin/minerals/iron Oral Solution (CENTRUM) 15 milliLiter(s) Oral daily  pantoprazole    Tablet 40 milliGRAM(s) Oral two times a day  polyethylene glycol 3350 17 Gram(s) Oral daily  pyridoxine 50 milliGRAM(s) Oral daily  rosuvastatin 10 milliGRAM(s) Oral at bedtime  senna 2 Tablet(s) Oral at bedtime  tacrolimus 2 milliGRAM(s) Oral daily  tacrolimus 1 milliGRAM(s) Oral at bedtime    MEDICATIONS  (PRN):  aluminum hydroxide/magnesium hydroxide/simethicone Suspension 30 milliLiter(s) Oral every 4 hours PRN Dyspepsia  dextrose Oral Gel 15 Gram(s) Oral once PRN Blood Glucose LESS THAN 70 milliGRAM(s)/deciliter  hydrALAZINE Injectable 10 milliGRAM(s) IV Push every 8 hours PRN SBP >180 and HR 60-70bpm  melatonin 3 milliGRAM(s) Oral at bedtime PRN Insomnia  metoprolol tartrate Injectable 5 milliGRAM(s) IV Push every 6 hours PRN SBP >170  ondansetron Injectable 4 milliGRAM(s) IV Push every 8 hours PRN Nausea and/or Vomiting      Allergies    No Known Allergies    Intolerances              LABS:                           6.5    5.38  )-----------( 336      ( 10 Dec 2024 07:30 )             19.8     12-10    134[L]  |  100  |  32[H]  ----------------------------<  203[H]  4.5   |  28  |  0.97    Ca    11.4[H]      10 Dec 2024 07:30        Urinalysis Basic - ( 10 Dec 2024 07:30 )    Color: x / Appearance: x / SG: x / pH: x  Gluc: 203 mg/dL / Ketone: x  / Bili: x / Urobili: x   Blood: x / Protein: x / Nitrite: x   Leuk Esterase: x / RBC: x / WBC x   Sq Epi: x / Non Sq Epi: x / Bacteria: x      CAPILLARY BLOOD GLUCOSE      POCT Blood Glucose.: 162 mg/dL (10 Dec 2024 12:01)  POCT Blood Glucose.: 222 mg/dL (10 Dec 2024 07:45)  POCT Blood Glucose.: 211 mg/dL (09 Dec 2024 21:04)  POCT Blood Glucose.: 182 mg/dL (09 Dec 2024 16:53)                RECENT CULTURES:          RADIOLOGY & ADDITIONAL TESTS:

## 2024-12-11 LAB
% GAMMA, URINE: 13.5 % — SIGNIFICANT CHANGE UP
ALBUMIN 24H MFR UR ELPH: 13.8 % — SIGNIFICANT CHANGE UP
ALBUMIN SERPL ELPH-MCNC: 2.1 G/DL — LOW (ref 3.3–5)
ALP SERPL-CCNC: 132 U/L — HIGH (ref 40–120)
ALPHA1 GLOB 24H MFR UR ELPH: 49.8 % — SIGNIFICANT CHANGE UP
ALPHA2 GLOB 24H MFR UR ELPH: 9.1 % — SIGNIFICANT CHANGE UP
ALT FLD-CCNC: 45 U/L — SIGNIFICANT CHANGE UP (ref 12–78)
ANION GAP SERPL CALC-SCNC: 7 MMOL/L — SIGNIFICANT CHANGE UP (ref 5–17)
AST SERPL-CCNC: 63 U/L — HIGH (ref 15–37)
B-GLOBULIN 24H MFR UR ELPH: 13.8 % — SIGNIFICANT CHANGE UP
BASOPHILS # BLD AUTO: 0.01 K/UL — SIGNIFICANT CHANGE UP (ref 0–0.2)
BASOPHILS NFR BLD AUTO: 0.2 % — SIGNIFICANT CHANGE UP (ref 0–2)
BILIRUB SERPL-MCNC: 0.8 MG/DL — SIGNIFICANT CHANGE UP (ref 0.2–1.2)
BUN SERPL-MCNC: 32 MG/DL — HIGH (ref 7–23)
CALCIUM SERPL-MCNC: 10.8 MG/DL — HIGH (ref 8.5–10.1)
CHLORIDE SERPL-SCNC: 102 MMOL/L — SIGNIFICANT CHANGE UP (ref 96–108)
CO2 SERPL-SCNC: 28 MMOL/L — SIGNIFICANT CHANGE UP (ref 22–31)
CREAT SERPL-MCNC: 0.84 MG/DL — SIGNIFICANT CHANGE UP (ref 0.5–1.3)
EGFR: 96 ML/MIN/1.73M2 — SIGNIFICANT CHANGE UP
EOSINOPHIL # BLD AUTO: 0.07 K/UL — SIGNIFICANT CHANGE UP (ref 0–0.5)
EOSINOPHIL NFR BLD AUTO: 1.6 % — SIGNIFICANT CHANGE UP (ref 0–6)
GLUCOSE SERPL-MCNC: 89 MG/DL — SIGNIFICANT CHANGE UP (ref 70–99)
HCT VFR BLD CALC: 25 % — LOW (ref 39–50)
HGB BLD-MCNC: 8.4 G/DL — LOW (ref 13–17)
IMM GRANULOCYTES NFR BLD AUTO: 0.9 % — SIGNIFICANT CHANGE UP (ref 0–0.9)
INTERPRETATION 24H UR IFE-IMP: SIGNIFICANT CHANGE UP
LYMPHOCYTES # BLD AUTO: 0.27 K/UL — LOW (ref 1–3.3)
LYMPHOCYTES # BLD AUTO: 6 % — LOW (ref 13–44)
M PROTEIN 24H UR ELPH-MRATE: PRESENT
MCHC RBC-ENTMCNC: 29.9 PG — SIGNIFICANT CHANGE UP (ref 27–34)
MCHC RBC-ENTMCNC: 33.6 G/DL — SIGNIFICANT CHANGE UP (ref 32–36)
MCV RBC AUTO: 89 FL — SIGNIFICANT CHANGE UP (ref 80–100)
MONOCYTES # BLD AUTO: 0.35 K/UL — SIGNIFICANT CHANGE UP (ref 0–0.9)
MONOCYTES NFR BLD AUTO: 7.8 % — SIGNIFICANT CHANGE UP (ref 2–14)
NEUTROPHILS # BLD AUTO: 3.77 K/UL — SIGNIFICANT CHANGE UP (ref 1.8–7.4)
NEUTROPHILS NFR BLD AUTO: 83.5 % — HIGH (ref 43–77)
NRBC # BLD: 0 /100 WBCS — SIGNIFICANT CHANGE UP (ref 0–0)
PLATELET # BLD AUTO: 438 K/UL — HIGH (ref 150–400)
POTASSIUM SERPL-MCNC: 4.8 MMOL/L — SIGNIFICANT CHANGE UP (ref 3.5–5.3)
POTASSIUM SERPL-SCNC: 4.8 MMOL/L — SIGNIFICANT CHANGE UP (ref 3.5–5.3)
PROT ?TM UR-MCNC: 46 MG/DL — HIGH (ref 0–12)
PROT PATTERN 24H UR ELPH-IMP: SIGNIFICANT CHANGE UP
PROT SERPL-MCNC: 7.3 G/DL — SIGNIFICANT CHANGE UP (ref 6–8.3)
RBC # BLD: 2.81 M/UL — LOW (ref 4.2–5.8)
RBC # FLD: 20.1 % — HIGH (ref 10.3–14.5)
SODIUM SERPL-SCNC: 137 MMOL/L — SIGNIFICANT CHANGE UP (ref 135–145)
TOTAL VOLUME - 24 HOUR: SIGNIFICANT CHANGE UP ML
URINE CREATININE CALCULATION: SIGNIFICANT CHANGE UP G/24 H (ref 1–2)
WBC # BLD: 4.51 K/UL — SIGNIFICANT CHANGE UP (ref 3.8–10.5)
WBC # FLD AUTO: 4.51 K/UL — SIGNIFICANT CHANGE UP (ref 3.8–10.5)

## 2024-12-11 PROCEDURE — 99232 SBSQ HOSP IP/OBS MODERATE 35: CPT

## 2024-12-11 PROCEDURE — 99233 SBSQ HOSP IP/OBS HIGH 50: CPT

## 2024-12-11 RX ORDER — PREDNISONE 20 MG/1
20 TABLET ORAL DAILY
Refills: 0 | Status: DISCONTINUED | OUTPATIENT
Start: 2024-12-11 | End: 2024-12-18

## 2024-12-11 RX ORDER — INSULIN GLARGINE 100 [IU]/ML
18 INJECTION, SOLUTION SUBCUTANEOUS AT BEDTIME
Refills: 0 | Status: DISCONTINUED | OUTPATIENT
Start: 2024-12-11 | End: 2024-12-12

## 2024-12-11 RX ADMIN — INSULIN GLARGINE 18 UNIT(S): 100 INJECTION, SOLUTION SUBCUTANEOUS at 22:20

## 2024-12-11 RX ADMIN — Medication 325 MILLIGRAM(S): at 18:03

## 2024-12-11 RX ADMIN — Medication 2 TABLET(S): at 22:19

## 2024-12-11 RX ADMIN — AZATHIOPRINE 50 MILLIGRAM(S): 100 TABLET ORAL at 18:11

## 2024-12-11 RX ADMIN — PANTOPRAZOLE SODIUM 40 MILLIGRAM(S): 40 TABLET, DELAYED RELEASE ORAL at 18:04

## 2024-12-11 RX ADMIN — LISINOPRIL 40 MILLIGRAM(S): 20 TABLET ORAL at 05:49

## 2024-12-11 RX ADMIN — CLONIDINE HYDROCHLORIDE 0.1 MILLIGRAM(S): 0.3 TABLET ORAL at 22:19

## 2024-12-11 RX ADMIN — HYDRALAZINE HYDROCHLORIDE 100 MILLIGRAM(S): 10 TABLET ORAL at 14:35

## 2024-12-11 RX ADMIN — PANTOPRAZOLE SODIUM 40 MILLIGRAM(S): 40 TABLET, DELAYED RELEASE ORAL at 05:49

## 2024-12-11 RX ADMIN — CARVEDILOL 12.5 MILLIGRAM(S): 25 TABLET, FILM COATED ORAL at 18:14

## 2024-12-11 RX ADMIN — Medication 300 MILLIGRAM(S): at 13:03

## 2024-12-11 RX ADMIN — PREDNISONE 20 MILLIGRAM(S): 20 TABLET ORAL at 16:12

## 2024-12-11 RX ADMIN — HYDRALAZINE HYDROCHLORIDE 100 MILLIGRAM(S): 10 TABLET ORAL at 22:20

## 2024-12-11 RX ADMIN — TACROLIMUS 1 MILLIGRAM(S): 5 CAPSULE ORAL at 22:19

## 2024-12-11 RX ADMIN — Medication 500 MILLIGRAM(S): at 05:48

## 2024-12-11 RX ADMIN — CALCITONIN SALMON 370 INTERNATIONAL UNIT(S): 200 INJECTION, SOLUTION INTRAMUSCULAR; SUBCUTANEOUS at 05:59

## 2024-12-11 RX ADMIN — Medication 325 MILLIGRAM(S): at 05:48

## 2024-12-11 RX ADMIN — AMLODIPINE BESYLATE 10 MILLIGRAM(S): 10 TABLET ORAL at 05:48

## 2024-12-11 RX ADMIN — ESCITALOPRAM OXALATE 10 MILLIGRAM(S): 10 TABLET, FILM COATED ORAL at 18:44

## 2024-12-11 RX ADMIN — Medication 50 MILLIGRAM(S): at 14:46

## 2024-12-11 RX ADMIN — CLONIDINE HYDROCHLORIDE 0.1 MILLIGRAM(S): 0.3 TABLET ORAL at 05:49

## 2024-12-11 RX ADMIN — Medication 500 MILLIGRAM(S): at 18:04

## 2024-12-11 RX ADMIN — CARVEDILOL 12.5 MILLIGRAM(S): 25 TABLET, FILM COATED ORAL at 05:48

## 2024-12-11 RX ADMIN — CLONIDINE HYDROCHLORIDE 0.1 MILLIGRAM(S): 0.3 TABLET ORAL at 14:36

## 2024-12-11 RX ADMIN — CALCITONIN SALMON 370 INTERNATIONAL UNIT(S): 200 INJECTION, SOLUTION INTRAMUSCULAR; SUBCUTANEOUS at 18:14

## 2024-12-11 RX ADMIN — HYDRALAZINE HYDROCHLORIDE 100 MILLIGRAM(S): 10 TABLET ORAL at 05:48

## 2024-12-11 RX ADMIN — AZATHIOPRINE 50 MILLIGRAM(S): 100 TABLET ORAL at 05:54

## 2024-12-11 RX ADMIN — ESCITALOPRAM OXALATE 10 MILLIGRAM(S): 10 TABLET, FILM COATED ORAL at 06:00

## 2024-12-11 RX ADMIN — Medication 88 MICROGRAM(S): at 05:54

## 2024-12-11 RX ADMIN — ROSUVASTATIN CALCIUM 10 MILLIGRAM(S): 5 TABLET, FILM COATED ORAL at 22:19

## 2024-12-11 RX ADMIN — TACROLIMUS 2 MILLIGRAM(S): 5 CAPSULE ORAL at 13:04

## 2024-12-11 RX ADMIN — Medication 15 MILLILITER(S): at 13:04

## 2024-12-11 RX ADMIN — ESCITALOPRAM OXALATE 10 MILLIGRAM(S): 10 TABLET, FILM COATED ORAL at 13:03

## 2024-12-11 NOTE — PROGRESS NOTE ADULT - ASSESSMENT
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

## 2024-12-11 NOTE — CHART NOTE - NSCHARTNOTEFT_GEN_A_CORE
I called Wife Ludmila, about plan for liver biopsy tomorrow she said she is okay with that but prefers to be here when he goes for the procedure but its okay to proceed even if she is not here. Plan of care d/w her. All questions answered

## 2024-12-11 NOTE — PROGRESS NOTE ADULT - SUBJECTIVE AND OBJECTIVE BOX
[INTERVAL HX: ]  Patient seen and examined;  Chart reviewed and events noted;     [MEDICATIONS]  MEDICATIONS  (STANDING):  amLODIPine   Tablet 10 milliGRAM(s) Oral daily  ascorbic acid 500 milliGRAM(s) Oral two times a day  azaTHIOprine 50 milliGRAM(s) Oral two times a day  buPROPion XL (24-Hour) . 300 milliGRAM(s) Oral daily  calcitonin Injectable 370 International Unit(s) IntraMuscular every 12 hours  carvedilol 12.5 milliGRAM(s) Oral every 12 hours  cloNIDine 0.1 milliGRAM(s) Oral three times a day  dextrose 5%. 1000 milliLiter(s) (50 mL/Hr) IV Continuous <Continuous>  dextrose 5%. 1000 milliLiter(s) (100 mL/Hr) IV Continuous <Continuous>  dextrose 50% Injectable 25 Gram(s) IV Push once  dextrose 50% Injectable 12.5 Gram(s) IV Push once  dextrose 50% Injectable 25 Gram(s) IV Push once  escitalopram 10 milliGRAM(s) Oral <User Schedule>  ferrous    sulfate 325 milliGRAM(s) Oral two times a day  glucagon  Injectable 1 milliGRAM(s) IntraMuscular once  hydrALAZINE 100 milliGRAM(s) Oral three times a day  insulin glargine Injectable (LANTUS) 24 Unit(s) SubCutaneous at bedtime  insulin lispro (ADMELOG) corrective regimen sliding scale   SubCutaneous three times a day before meals  insulin lispro Injectable (ADMELOG) 5 Unit(s) SubCutaneous three times a day before meals  levothyroxine 88 MICROGram(s) Oral <User Schedule>  lisinopril 40 milliGRAM(s) Oral daily  mineral oil enema 133 milliLiter(s) Rectal once  multivitamin/minerals/iron Oral Solution (CENTRUM) 15 milliLiter(s) Oral daily  pantoprazole    Tablet 40 milliGRAM(s) Oral two times a day  polyethylene glycol 3350 17 Gram(s) Oral daily  pyridoxine 50 milliGRAM(s) Oral daily  rosuvastatin 10 milliGRAM(s) Oral at bedtime  senna 2 Tablet(s) Oral at bedtime  tacrolimus 2 milliGRAM(s) Oral daily  tacrolimus 1 milliGRAM(s) Oral at bedtime    MEDICATIONS  (PRN):  aluminum hydroxide/magnesium hydroxide/simethicone Suspension 30 milliLiter(s) Oral every 4 hours PRN Dyspepsia  dextrose Oral Gel 15 Gram(s) Oral once PRN Blood Glucose LESS THAN 70 milliGRAM(s)/deciliter  hydrALAZINE Injectable 10 milliGRAM(s) IV Push every 8 hours PRN SBP >180 and HR 60-70bpm  melatonin 3 milliGRAM(s) Oral at bedtime PRN Insomnia  metoprolol tartrate Injectable 5 milliGRAM(s) IV Push every 6 hours PRN SBP >170  ondansetron Injectable 4 milliGRAM(s) IV Push every 8 hours PRN Nausea and/or Vomiting      [VITALS]  Vital Signs Last 24 Hrs  T(C): 36.9 (11 Dec 2024 11:57), Max: 37.1 (10 Dec 2024 15:00)  T(F): 98.4 (11 Dec 2024 11:57), Max: 98.8 (10 Dec 2024 15:00)  HR: 65 (11 Dec 2024 11:57) (63 - 66)  BP: 160/65 (11 Dec 2024 11:57) (106/66 - 160/65)  BP(mean): --  RR: 19 (11 Dec 2024 11:57) (18 - 19)  SpO2: 96% (11 Dec 2024 11:57) (93% - 98%)    Parameters below as of 11 Dec 2024 11:57  Patient On (Oxygen Delivery Method): room air      [WT/HT]  Daily     Daily Weight in k.6 (11 Dec 2024 04:47)  [VENT]      [PHYSICAL EXAM]  GEN: NAD  HEENT: normocephalic and atraumatic. EOMI. PERRL.    NECK: Supple.  No lymphadenopathy   LUNGS: Clear to auscultation.  HEART: Regular rate and rhythm,  no MRG  ABDOMEN: Soft, nontender, and nondistended.  Positive bowel sounds.    : No CVA tenderness  EXTREMITIES: Without edema.  NEUROLOGIC: grossly intact.  PSYCHIATRIC: Appropriate affect .  SKIN: No rash     [LABS:]                        8.4    4.51  )-----------( 438      ( 11 Dec 2024 10:30 )             25.0     12-    137  |  102  |  32[H]  ----------------------------<  89  4.8   |  28  |  0.84    Ca    10.8[H]      11 Dec 2024 10:30    TPro  7.3  /  Alb  2.1[L]  /  TBili  0.8  /  DBili  x   /  AST  63[H]  /  ALT  45  /  AlkPhos  132[H]  12-11          Iron - Total Binding Capacity.: 182 ug/dL *L* [220 - 430] (24 @ 10:00)    Ferritin: 973 ng/mL *H* [30 - 400] (24 @ 10:00)    Serum Protein Electrophoresis Interp: Duplicate Sample (24 @ 16:06)    Serum Protein Electrophoresis Interp: Weak Beta Migrating Paraprotein Identified (24 @ 11:50)    Ferritin: 885 ng/mL *H* [30 - 400] (24 @ 05:55)    Iron - Total Binding Capacity.: 164 ug/dL *L* [220 - 430] (24 @ 05:55)    Serum Protein Electrophoresis Interp: Weak Beta Migrating Paraprotein Identified (24 @ 17:10)    Immunofixation, Serum:   Weak IgM Lambda Band Identified      Reference Range: None Detected (24 @ 17:10)    Sedimentation Rate, Erythrocyte: 117 mm/hr *H* [0 - 20] (24 @ 16:05)      Urinalysis Basic - ( 11 Dec 2024 10:30 )    Color: x / Appearance: x / SG: x / pH: x  Gluc: 89 mg/dL / Ketone: x  / Bili: x / Urobili: x   Blood: x / Protein: x / Nitrite: x   Leuk Esterase: x / RBC: x / WBC x   Sq Epi: x / Non Sq Epi: x / Bacteria: x                [RADIOLOGY STUDIES:]     [INTERVAL HX: ]  Patient seen and examined;  Chart reviewed and events noted;     no CP, no SOB      [MEDICATIONS]  MEDICATIONS  (STANDING):  amLODIPine   Tablet 10 milliGRAM(s) Oral daily  ascorbic acid 500 milliGRAM(s) Oral two times a day  azaTHIOprine 50 milliGRAM(s) Oral two times a day  buPROPion XL (24-Hour) . 300 milliGRAM(s) Oral daily  calcitonin Injectable 370 International Unit(s) IntraMuscular every 12 hours  carvedilol 12.5 milliGRAM(s) Oral every 12 hours  cloNIDine 0.1 milliGRAM(s) Oral three times a day  dextrose 5%. 1000 milliLiter(s) (50 mL/Hr) IV Continuous <Continuous>  dextrose 5%. 1000 milliLiter(s) (100 mL/Hr) IV Continuous <Continuous>  dextrose 50% Injectable 25 Gram(s) IV Push once  dextrose 50% Injectable 12.5 Gram(s) IV Push once  dextrose 50% Injectable 25 Gram(s) IV Push once  escitalopram 10 milliGRAM(s) Oral <User Schedule>  ferrous    sulfate 325 milliGRAM(s) Oral two times a day  glucagon  Injectable 1 milliGRAM(s) IntraMuscular once  hydrALAZINE 100 milliGRAM(s) Oral three times a day  insulin glargine Injectable (LANTUS) 24 Unit(s) SubCutaneous at bedtime  insulin lispro (ADMELOG) corrective regimen sliding scale   SubCutaneous three times a day before meals  insulin lispro Injectable (ADMELOG) 5 Unit(s) SubCutaneous three times a day before meals  levothyroxine 88 MICROGram(s) Oral <User Schedule>  lisinopril 40 milliGRAM(s) Oral daily  mineral oil enema 133 milliLiter(s) Rectal once  multivitamin/minerals/iron Oral Solution (CENTRUM) 15 milliLiter(s) Oral daily  pantoprazole    Tablet 40 milliGRAM(s) Oral two times a day  polyethylene glycol 3350 17 Gram(s) Oral daily  pyridoxine 50 milliGRAM(s) Oral daily  rosuvastatin 10 milliGRAM(s) Oral at bedtime  senna 2 Tablet(s) Oral at bedtime  tacrolimus 2 milliGRAM(s) Oral daily  tacrolimus 1 milliGRAM(s) Oral at bedtime    MEDICATIONS  (PRN):  aluminum hydroxide/magnesium hydroxide/simethicone Suspension 30 milliLiter(s) Oral every 4 hours PRN Dyspepsia  dextrose Oral Gel 15 Gram(s) Oral once PRN Blood Glucose LESS THAN 70 milliGRAM(s)/deciliter  hydrALAZINE Injectable 10 milliGRAM(s) IV Push every 8 hours PRN SBP >180 and HR 60-70bpm  melatonin 3 milliGRAM(s) Oral at bedtime PRN Insomnia  metoprolol tartrate Injectable 5 milliGRAM(s) IV Push every 6 hours PRN SBP >170  ondansetron Injectable 4 milliGRAM(s) IV Push every 8 hours PRN Nausea and/or Vomiting      [VITALS]  Vital Signs Last 24 Hrs  T(C): 36.9 (11 Dec 2024 11:57), Max: 37.1 (10 Dec 2024 15:00)  T(F): 98.4 (11 Dec 2024 11:57), Max: 98.8 (10 Dec 2024 15:00)  HR: 65 (11 Dec 2024 11:57) (63 - 66)  BP: 160/65 (11 Dec 2024 11:57) (106/66 - 160/65)  BP(mean): --  RR: 19 (11 Dec 2024 11:57) (18 - 19)  SpO2: 96% (11 Dec 2024 11:57) (93% - 98%)    Parameters below as of 11 Dec 2024 11:57  Patient On (Oxygen Delivery Method): room air      [WT/HT]  Daily     Daily Weight in k.6 (11 Dec 2024 04:47)  [VENT]      [PHYSICAL EXAM]  GEN: NAD  HEENT: normocephalic and atraumatic. EOMI. PERRL.    NECK: Supple.  No lymphadenopathy   LUNGS: Clear to auscultation.  HEART: Regular rate and rhythm,  no MRG  ABDOMEN: Soft, nontender, and nondistended.  Positive bowel sounds.    : No CVA tenderness  EXTREMITIES: Without edema.  NEUROLOGIC: grossly intact.  PSYCHIATRIC: Appropriate affect .  SKIN: No rash     [LABS:]                        8.4    4.51  )-----------( 438      ( 11 Dec 2024 10:30 )             25.0     12-11    137  |  102  |  32[H]  ----------------------------<  89  4.8   |  28  |  0.84    Ca    10.8[H]      11 Dec 2024 10:30    TPro  7.3  /  Alb  2.1[L]  /  TBili  0.8  /  DBili  x   /  AST  63[H]  /  ALT  45  /  AlkPhos  132[H]  12-11          Iron - Total Binding Capacity.: 182 ug/dL *L* [220 - 430] (24 @ 10:00)    Ferritin: 973 ng/mL *H* [30 - 400] (24 @ 10:00)    Serum Protein Electrophoresis Interp: Duplicate Sample (24 @ 16:06)    Serum Protein Electrophoresis Interp: Weak Beta Migrating Paraprotein Identified (24 @ 11:50)    Ferritin: 885 ng/mL *H* [30 - 400] (24 @ 05:55)    Iron - Total Binding Capacity.: 164 ug/dL *L* [220 - 430] (24 @ 05:55)    Serum Protein Electrophoresis Interp: Weak Beta Migrating Paraprotein Identified (24 @ 17:10)    Immunofixation, Serum:   Weak IgM Lambda Band Identified      Reference Range: None Detected (24 @ 17:10)    Sedimentation Rate, Erythrocyte: 117 mm/hr *H* [0 - 20] (24 @ 16:05)      Urinalysis Basic - ( 11 Dec 2024 10:30 )    Color: x / Appearance: x / SG: x / pH: x  Gluc: 89 mg/dL / Ketone: x  / Bili: x / Urobili: x   Blood: x / Protein: x / Nitrite: x   Leuk Esterase: x / RBC: x / WBC x   Sq Epi: x / Non Sq Epi: x / Bacteria: x                [RADIOLOGY STUDIES:]

## 2024-12-11 NOTE — PROGRESS NOTE ADULT - SUBJECTIVE AND OBJECTIVE BOX
[facility] -- Tom Rahman Pannella, Patel, Savella, Goodger, Cohen  Office # [phone]    Follow Up:  Uncontrolled HTN    Subjective/Observations: seen and examined, awake, resting comfortably in bed, unable to provide meaningful information at this time, NAD. Tolerating room air.     REVIEW OF SYSTEMS: All other review of systems is negative unless indicated above  PAST MEDICAL & SURGICAL HISTORY:  Diabetes Mellitus Type II  Insulin pump (2010)      GERD (gastroesophageal reflux disease)      Depression      Hypothyroidism      Hyperlipidemia      Kidney transplanted  2012      Hypertension      ANGELIKA (obstructive sleep apnea)      Peripheral neuropathy      Shoulder fracture  Left.      History of colonoscopy      S/P kidney transplant  2012      S/P cholecystectomy      Retroperitoneal fibrosis  s/p surgery      AV fistula  Right arm      S/P right cataract extraction      S/P left cataract extraction      H/O unilateral nephrectomy  Left        MEDICATIONS  (STANDING):  amLODIPine   Tablet 10 milliGRAM(s) Oral daily  ascorbic acid 500 milliGRAM(s) Oral two times a day  azaTHIOprine 50 milliGRAM(s) Oral two times a day  buPROPion XL (24-Hour) . 300 milliGRAM(s) Oral daily  calcitonin Injectable 370 International Unit(s) IntraMuscular every 12 hours  carvedilol 12.5 milliGRAM(s) Oral every 12 hours  cloNIDine 0.1 milliGRAM(s) Oral three times a day  dextrose 5%. 1000 milliLiter(s) (50 mL/Hr) IV Continuous <Continuous>  dextrose 5%. 1000 milliLiter(s) (100 mL/Hr) IV Continuous <Continuous>  dextrose 50% Injectable 25 Gram(s) IV Push once  dextrose 50% Injectable 12.5 Gram(s) IV Push once  dextrose 50% Injectable 25 Gram(s) IV Push once  escitalopram 10 milliGRAM(s) Oral <User Schedule>  ferrous    sulfate 325 milliGRAM(s) Oral two times a day  glucagon  Injectable 1 milliGRAM(s) IntraMuscular once  hydrALAZINE 100 milliGRAM(s) Oral three times a day  insulin glargine Injectable (LANTUS) 24 Unit(s) SubCutaneous at bedtime  insulin lispro (ADMELOG) corrective regimen sliding scale   SubCutaneous three times a day before meals  insulin lispro Injectable (ADMELOG) 5 Unit(s) SubCutaneous three times a day before meals  levothyroxine 88 MICROGram(s) Oral <User Schedule>  lisinopril 40 milliGRAM(s) Oral daily  mineral oil enema 133 milliLiter(s) Rectal once  multivitamin/minerals/iron Oral Solution (CENTRUM) 15 milliLiter(s) Oral daily  pantoprazole    Tablet 40 milliGRAM(s) Oral two times a day  polyethylene glycol 3350 17 Gram(s) Oral daily  pyridoxine 50 milliGRAM(s) Oral daily  rosuvastatin 10 milliGRAM(s) Oral at bedtime  senna 2 Tablet(s) Oral at bedtime  tacrolimus 2 milliGRAM(s) Oral daily  tacrolimus 1 milliGRAM(s) Oral at bedtime    MEDICATIONS  (PRN):  aluminum hydroxide/magnesium hydroxide/simethicone Suspension 30 milliLiter(s) Oral every 4 hours PRN Dyspepsia  dextrose Oral Gel 15 Gram(s) Oral once PRN Blood Glucose LESS THAN 70 milliGRAM(s)/deciliter  hydrALAZINE Injectable 10 milliGRAM(s) IV Push every 8 hours PRN SBP >180 and HR 60-70bpm  melatonin 3 milliGRAM(s) Oral at bedtime PRN Insomnia  metoprolol tartrate Injectable 5 milliGRAM(s) IV Push every 6 hours PRN SBP >170  ondansetron Injectable 4 milliGRAM(s) IV Push every 8 hours PRN Nausea and/or Vomiting    Allergies    No Known Allergies    Intolerances      Vital Signs Last 24 Hrs  T(C): 36.9 (11 Dec 2024 04:47), Max: 37.1 (10 Dec 2024 15:00)  T(F): 98.4 (11 Dec 2024 04:47), Max: 98.8 (10 Dec 2024 15:00)  HR: 63 (11 Dec 2024 04:47) (63 - 66)  BP: 136/66 (11 Dec 2024 04:47) (97/47 - 136/66)  BP(mean): --  RR: 18 (11 Dec 2024 04:47) (18 - 19)  SpO2: 98% (11 Dec 2024 04:47) (93% - 98%)    Parameters below as of 11 Dec 2024 04:47  Patient On (Oxygen Delivery Method): room air      I&O's Summary    10 Dec 2024 07:01  -  11 Dec 2024 07:00  --------------------------------------------------------  IN: 341 mL / OUT: 750 mL / NET: -409 mL          TELE: Not on telemetry   PHYSICAL EXAM:  Constitutional: NAD, awake and alert  HEENT: Moist Mucous Membranes, Anicteric  Pulmonary: Non-labored, breath sounds are clear bilaterally, No wheezing, rales or rhonchi  Cardiovascular: Regular, S1 and S2, No murmurs, rubs, gallops or clicks  Gastrointestinal: Bowel Sounds present, soft, nontender.   Lymph: No peripheral edema. No lymphadenopathy.  Skin: No visible rashes or ulcers.  Psych:  Mood & affect appropriate  LABS: All Labs Reviewed:                        6.5    5.38  )-----------( 336      ( 10 Dec 2024 07:30 )             19.8                         7.0    5.02  )-----------( 332      ( 09 Dec 2024 10:40 )             21.3     10 Dec 2024 07:30    134    |  100    |  32     ----------------------------<  203    4.5     |  28     |  0.97   09 Dec 2024 10:40    132    |  97     |  22     ----------------------------<  194    4.2     |  29     |  0.89     Ca    11.4       10 Dec 2024 07:30  Ca    10.7       09 Dec 2024 10:40            12 Lead ECG:   Ventricular Rate 81 BPM    Atrial Rate 81 BPM    P-R Interval 148 ms    QRS Duration 104 ms    Q-T Interval 406 ms    QTC Calculation(Bazett) 471 ms    P Axis 44 degrees    R Axis 12 degrees    T Axis 53 degrees    Diagnosis Line Normal sinus rhythm  Normal ECG  When compared with ECG of 29-NOV-2024 07:11,  Nonspecific T wave abnormality, improved in Lateral leads  Confirmed by Kya Gonzalez ([id number]) on 12/1/2024 10:43:18 AM (12-01-24 @ 09:28)      TRANSTHORACIC ECHOCARDIOGRAM REPORT  ________________________________________________________________________________                                      _______       Pt. Name:       JAN CALVIN Study Date:    11/29/2024  MRN:            [id number]          YOB: 1955  Accession #:    [id number]         Age:           69 years  Account#:       [id number]        Gender:        M  Heart Rate:                       Height:        64.17 in (163.00 cm)  Rhythm:       Weight:        169.75 lb (77.00 kg)  Blood Pressure: 196/81 mmHg       BSA/BMI:       1.83 m² / 28.98 kg/m²  ________________________________________________________________________________________  Referring Physician:    [id number] Ale Ibrahim  Interpreting Physician: Kya Gonzalez MD  Primary Sonographer:    Butch Salazar    CPT:               ECHO TTE WO CON COMP W DOPP - 45138.m  Indication(s):     Cardiac murmur, unspecified - R01.1  Procedure:         Transthoracic echocardiogram with 2-D, M-mode and complete                     spectral and color flow Doppler.  Ordering Location: [facility]  Admission Status:  ED    _______________________________________________________________________________________     CONCLUSIONS:      1. Left ventricular cavity is normal in size. Left ventricular systolic function is normal with an ejection fraction of 60 % by Moreno's method of disks.   2. Trace pericardial effusion noted adjacent to the posterior left ventricle.   3. Normal right ventricular cavity size and normal right ventricular systolic function.   4. Aortic valve anatomy cannot be determined with normal systolic excursion. There is calcification of the aortic valve leaflets.   5. Structurally normal mitral valve with normalleaflet excursion.   6. Structurally normal tricuspid valve with normal leaflet excursion. Trace tricuspid regurgitation.   7. The inferior vena cava is normal in size measuring 1.36 cm in diameter, (normal <2.1cm) with normal inspiratory collapse (normal >50%) consistent with normal right atrial pressure (~3, range 0-5mmHg).    ________________________________________________________________________________________  FINDINGS:     Left Ventricle:  The left ventricular cavity is normal in size. Left ventricular systolic function is normal with a calculated ejection fraction of 60 % by the Moreno's biplane method of disks.     Right Ventricle:  The right ventricular cavity is normal in size and right ventricular systolic function is normal. Tricuspid annular plane systolic excursion (TAPSE) is 2.9 cm (normal >=1.7 cm).     Left Atrium:  The left atrium is normal in size with an indexed volume of 33.15 ml/m².     Right Atrium:  The right atrium is normal in size with an indexed volume of 21.17 ml/m².     Interatrial Septum:  The interatrial septum appears intact.     Aortic Valve:  The aortic valve anatomy cannot be determined with normal systolic excursion. There is calcification of the aortic valve leaflets. The peak transaortic velocity is 2.15 m/s, peak transaortic gradient is 18.5 mmHg and mean transaortic gradient is 11.0 mmHg with an LVOT/aortic valve VTI ratio of 0.64. The aortic valve area is estimated at 2.67 cm² by the continuity equation. There is no evidence of aortic regurgitation.     Mitral Valve:  Structurally normal mitral valve with normal leaflet excursion. There is calcification of the mitral valve annulus.     Tricuspid Valve:  Structurally normal tricuspid valve with normal leaflet excursion. There is trace tricuspid regurgitation. Estimated pulmonary artery systolic pressure is 8 mmHg.     Aorta:  The aortic root at the sinuses of Valsalva is normal in size, measuring 3.50 cm (indexed 1.91 cm/m²). The ascending aorta is dilated, measuring 4.10 cm (indexed 2.24 cm/m²).     Pericardium:  There is a trace pericardial effusion noted adjacent to the posterior left ventricle.     Systemic Veins:  The inferior vena cava is normal in size measuring 1.36 cm in diameter, (normal <2.1cm) with normal inspiratory collapse (normal >50%) consistent with normal right atrial pressure (~3, range 0-5mmHg).  ____________________________________________________________________  QUANTITATIVE DATA:  Left Ventricle Measurements: (Indexed to BSA)     IVSd (2D):   1.0 cm  LVPWd (2D):  1.0 cm  LVIDd (2D):  5.3 cm  LVIDs (2D):  3.6 cm  LV Mass:     203 g  111.2 g/m²  LV Vol d, MOD A2C: 97.8 ml  53.50 ml/m²  LV Vol d, MOD A4C: 121.0 ml 66.19 ml/m²  LV Vol d, MOD BP:  109.5 ml 59.90 ml/m²  LV Vol s, MOD A2C: 36.4 ml  19.91 ml/m²  LV Vol s, MOD A4C: 49.5 ml  27.08 ml/m²  LV Vol s, MOD BP:  44.0 ml  24.04 ml/m²  LVOT SV MOD BP:    65.5 ml  LV EF% MOD BP:     60 %     MV E Vmax:    1.02 m/s  MV A Vmax:    0.93 m/s  MV E/A:       1.10  e' lateral:   13.10 cm/s  e' medial:    9.25 cm/s  E/e' lateral: 7.79  E/e' medial:  11.03  E/e' Average: 9.13  MV DT:        151 msec    Aorta Measurements: (Normal range) (Indexed to BSA)     Ao Root d     3.50 cm (3.1 - 3.7 cm) 1.91 cm/m²  Ao Asc d, 2D: 4.10  Ao Asc prox:  4.10 cm               2.24 cm/m²            Left Atrium Measurements: (Indexed to BSA)  LA Diam 2D: 4.40 cm         Right Ventricle Measurements: Right Atrial Measurements:     TAPSE:            2.9 cm      RA Vol:            38.70 ml  RV Base (RVID1):  3.7cm      RA Vol s, MOD A4C  38.7 ml  RV Mid (RVID2):   3.1 cm      RA Vol Index:      21.17 ml/m²  RV Major (RVID3): 8.0 cm      RA Area s, MOD A4C 14.7 cm²       LVOT / RVOT/ Qp/Qs Data: (Indexed to BSA)  LVOT Diameter:  2.30 cm  LVOT Area:      4.15cm²  LVOT Vmax:      1.34 m/s  LVOT Vmn:       0.949 m/s  LVOT VTI:       26.30 cm  LVOT peak grad: 7 mmHg  LVOT mean grad: 4.0 mmHg  LVOT SV:        109.3 ml  59.78 ml/m²    Aortic Valve Measurements:  AV Vmax:                2.2 m/s  AV Peak Gradient:       18.5 mmHg  AV Mean Gradient:       11.0 mmHg  AV VTI:                 41.0 cm  AV VTI Ratio:           0.64  AoV EOA, Contin:        2.67 cm²  AoV EOA, Contin i:      1.46 cm²/m²  AoV Dimensionless Index 0.64    Mitral Valve Measurements:     MV E Vmax: 1.0 m/s  MV A Vmax: 0.9 m/s  MV E/A:    1.1       Tricuspid Valve Measurements:     TR Vmax:          1.2 m/s  TR Peak Gradient: 5.3 mmHg  RA Pressure:      3 mmHg  PASP:             8 mmHg    ________________________________________________________________________________________  Electronically signed on 11/30/2024 at 1:57:15 PM by Kya Gonzalez MD         *** Final ***

## 2024-12-11 NOTE — PROGRESS NOTE ADULT - SUBJECTIVE AND OBJECTIVE BOX
[facility]                                                       Dr. Bruce, Dr. Prabhakar, Dr. Cabrales, Dr. Zaragoza, Dr. Bingham, Dr. Loya                                      [facility] [place], [facility], [address]                                                 [address] [address]                                      Ph: [phone]  Fax: [phone]                                         Ph: [phone]  Fax: [phone]      Patient is a 69y old  Male who presents with a chief complaint of AMS (01 Dec 2024 11:19)  Patient seen in follow up for CKD, h/o Kidney transplant.        PAST MEDICAL HISTORY:  Diabetes Mellitus Type II    ESRD on Dialysis    GERD (gastroesophageal reflux disease)    Depression    Hypothyroidism    Hyperlipidemia    Kidney transplanted    Hypertension    ANGELIKA (obstructive sleep apnea)    Peripheral neuropathy    Shoulder fracture        MEDICATIONS  (STANDING):  amLODIPine   Tablet 10 milliGRAM(s) Oral daily  ascorbic acid 500 milliGRAM(s) Oral two times a day  azaTHIOprine 50 milliGRAM(s) Oral two times a day  buPROPion XL (24-Hour) . 300 milliGRAM(s) Oral daily  calcitonin Injectable 370 International Unit(s) IntraMuscular every 12 hours  carvedilol 12.5 milliGRAM(s) Oral every 12 hours  cloNIDine 0.1 milliGRAM(s) Oral three times a day  dextrose 5%. 1000 milliLiter(s) (100 mL/Hr) IV Continuous <Continuous>  dextrose 5%. 1000 milliLiter(s) (50 mL/Hr) IV Continuous <Continuous>  dextrose 50% Injectable 25 Gram(s) IV Push once  dextrose 50% Injectable 12.5 Gram(s) IV Push once  dextrose 50% Injectable 25 Gram(s) IV Push once  escitalopram 10 milliGRAM(s) Oral <User Schedule>  ferrous    sulfate 325 milliGRAM(s) Oral two times a day  glucagon  Injectable 1 milliGRAM(s) IntraMuscular once  hydrALAZINE 100 milliGRAM(s) Oral three times a day  insulin glargine Injectable (LANTUS) 18 Unit(s) SubCutaneous at bedtime  insulin lispro (ADMELOG) corrective regimen sliding scale   SubCutaneous three times a day before meals  levothyroxine 88 MICROGram(s) Oral <User Schedule>  lisinopril 40 milliGRAM(s) Oral daily  mineral oil enema 133 milliLiter(s) Rectal once  multivitamin/minerals/iron Oral Solution (CENTRUM) 15 milliLiter(s) Oral daily  pantoprazole    Tablet 40 milliGRAM(s) Oral two times a day  polyethylene glycol 3350 17 Gram(s) Oral daily  pyridoxine 50 milliGRAM(s) Oral daily  rosuvastatin 10 milliGRAM(s) Oral at bedtime  senna 2 Tablet(s) Oral at bedtime  tacrolimus 2 milliGRAM(s) Oral daily  tacrolimus 1 milliGRAM(s) Oral at bedtime    MEDICATIONS  (PRN):  aluminum hydroxide/magnesium hydroxide/simethicone Suspension 30 milliLiter(s) Oral every 4 hours PRN Dyspepsia  dextrose Oral Gel 15 Gram(s) Oral once PRN Blood Glucose LESS THAN 70 milliGRAM(s)/deciliter  hydrALAZINE Injectable 10 milliGRAM(s) IV Push every 8 hours PRN SBP >180 and HR 60-70bpm  melatonin 3 milliGRAM(s) Oral at bedtime PRN Insomnia  metoprolol tartrate Injectable 5 milliGRAM(s) IV Push every 6 hours PRN SBP >170  ondansetron Injectable 4 milliGRAM(s) IV Push every 8 hours PRN Nausea and/or Vomiting    T(C): 36.8 (12-11-24 @ 14:30), Max: 37.9 (12-09-24 @ 19:57)  HR: 67 (12-11-24 @ 14:30) (63 - 74)  BP: 125/68 (12-11-24 @ 14:30) (97/47 - 190/67)  RR: 18 (12-11-24 @ 14:30)  SpO2: 96% (12-11-24 @ 14:30)  Wt(kg): --  I&O's Detail    10 Dec 2024 07:01  -  11 Dec 2024 07:00  --------------------------------------------------------  IN:    PRBCs (Packed Red Blood Cells): 341 mL  Total IN: 341 mL    OUT:    Voided (mL): 750 mL  Total OUT: 750 mL    Total NET: -409 mL      11 Dec 2024 07:01  -  11 Dec 2024 15:52  --------------------------------------------------------  IN:  Total IN: 0 mL    OUT:    Voided (mL): 700 mL  Total OUT: 700 mL    Total NET: -700 mL                            PHYSICAL EXAM:  General: No distress  Respiratory: b/l air entry  Cardiovascular: S1 S2  Gastrointestinal: soft  Extremities:  no edema          LABORATORY:                        8.4    4.51  )-----------( 438      ( 11 Dec 2024 10:30 )             25.0     12-11    137  |  102  |  32[H]  ----------------------------<  89  4.8   |  28  |  0.84    Ca    10.8[H]      11 Dec 2024 10:30    TPro  7.3  /  Alb  2.1[L]  /  TBili  0.8  /  DBili  x   /  AST  63[H]  /  ALT  45  /  AlkPhos  132[H]  12-11    Sodium: 137 mmol/L (12-11 @ 10:30)  Sodium: 134 mmol/L (12-10 @ 07:30)    Potassium: 4.8 mmol/L (12-11 @ 10:30)  Potassium: 4.5 mmol/L (12-10 @ 07:30)    Hemoglobin: 8.4 g/dL (12-11 @ 10:30)  Hemoglobin: 6.5 g/dL (12-10 @ 07:30)  Hemoglobin: 7.0 g/dL (12-09 @ 10:40)    Creatinine, Serum 0.84 (12-11 @ 10:30)  Creatinine, Serum 0.97 (12-10 @ 07:30)  Creatinine, Serum 0.89 (12-09 @ 10:40)        LIVER FUNCTIONS - ( 11 Dec 2024 10:30 )  Alb: 2.1 g/dL / Pro: 7.3 g/dL / ALK PHOS: 132 U/L / ALT: 45 U/L / AST: 63 U/L / GGT: x           Urinalysis Basic - ( 11 Dec 2024 10:30 )    Color: x / Appearance: x / SG: x / pH: x  Gluc: 89 mg/dL / Ketone: x  / Bili: x / Urobili: x   Blood: x / Protein: x / Nitrite: x   Leuk Esterase: x / RBC: x / WBC x   Sq Epi: x / Non Sq Epi: x / Bacteria: x                                                                                     [facility]                                                       Dr. Bruce, Dr. Prabhakar, Dr. Cabrales, Dr. Zaragoza, Dr. Bingham, Dr. Loya                                      [facility], [facility], [address]                                                 [address] [address]                                      Ph: [phone]  Fax: [phone]                                         Ph: [phone]  Fax: [phone]      Patient is a 69y old  Male who presents with a chief complaint of AMS (01 Dec 2024 11:19)  Patient seen in follow up for CKD, h/o Kidney transplant.        PAST MEDICAL HISTORY:  Diabetes Mellitus Type II    ESRD on Dialysis    GERD (gastroesophageal reflux disease)    Depression    Hypothyroidism    Hyperlipidemia    Kidney transplanted    Hypertension    ANGELIKA (obstructive sleep apnea)    Peripheral neuropathy    Shoulder fracture        MEDICATIONS  (STANDING):  amLODIPine   Tablet 10 milliGRAM(s) Oral daily  ascorbic acid 500 milliGRAM(s) Oral two times a day  azaTHIOprine 50 milliGRAM(s) Oral two times a day  buPROPion XL (24-Hour) . 300 milliGRAM(s) Oral daily  calcitonin Injectable 370 International Unit(s) IntraMuscular every 12 hours  carvedilol 12.5 milliGRAM(s) Oral every 12 hours  cloNIDine 0.1 milliGRAM(s) Oral three times a day  dextrose 5%. 1000 milliLiter(s) (100 mL/Hr) IV Continuous <Continuous>  dextrose 5%. 1000 milliLiter(s) (50 mL/Hr) IV Continuous <Continuous>  dextrose 50% Injectable 25 Gram(s) IV Push once  dextrose 50% Injectable 12.5 Gram(s) IV Push once  dextrose 50% Injectable 25 Gram(s) IV Push once  escitalopram 10 milliGRAM(s) Oral <User Schedule>  ferrous    sulfate 325 milliGRAM(s) Oral two times a day  glucagon  Injectable 1 milliGRAM(s) IntraMuscular once  hydrALAZINE 100 milliGRAM(s) Oral three times a day  insulin glargine Injectable (LANTUS) 18 Unit(s) SubCutaneous at bedtime  insulin lispro (ADMELOG) corrective regimen sliding scale   SubCutaneous three times a day before meals  levothyroxine 88 MICROGram(s) Oral <User Schedule>  lisinopril 40 milliGRAM(s) Oral daily  mineral oil enema 133 milliLiter(s) Rectal once  multivitamin/minerals/iron Oral Solution (CENTRUM) 15 milliLiter(s) Oral daily  pantoprazole    Tablet 40 milliGRAM(s) Oral two times a day  polyethylene glycol 3350 17 Gram(s) Oral daily  pyridoxine 50 milliGRAM(s) Oral daily  rosuvastatin 10 milliGRAM(s) Oral at bedtime  senna 2 Tablet(s) Oral at bedtime  tacrolimus 2 milliGRAM(s) Oral daily  tacrolimus 1 milliGRAM(s) Oral at bedtime    MEDICATIONS  (PRN):  aluminum hydroxide/magnesium hydroxide/simethicone Suspension 30 milliLiter(s) Oral every 4 hours PRN Dyspepsia  dextrose Oral Gel 15 Gram(s) Oral once PRN Blood Glucose LESS THAN 70 milliGRAM(s)/deciliter  hydrALAZINE Injectable 10 milliGRAM(s) IV Push every 8 hours PRN SBP >180 and HR 60-70bpm  melatonin 3 milliGRAM(s) Oral at bedtime PRN Insomnia  metoprolol tartrate Injectable 5 milliGRAM(s) IV Push every 6 hours PRN SBP >170  ondansetron Injectable 4 milliGRAM(s) IV Push every 8 hours PRN Nausea and/or Vomiting    T(C): 36.8 (12-11-24 @ 14:30), Max: 37.9 (12-09-24 @ 19:57)  HR: 67 (12-11-24 @ 14:30) (63 - 74)  BP: 125/68 (12-11-24 @ 14:30) (97/47 - 190/67)  RR: 18 (12-11-24 @ 14:30)  SpO2: 96% (12-11-24 @ 14:30)  Wt(kg): --  I&O's Detail    10 Dec 2024 07:01  -  11 Dec 2024 07:00  --------------------------------------------------------  IN:    PRBCs (Packed Red Blood Cells): 341 mL  Total IN: 341 mL    OUT:    Voided (mL): 750 mL  Total OUT: 750 mL    Total NET: -409 mL      11 Dec 2024 07:01  -  11 Dec 2024 15:52  --------------------------------------------------------  IN:  Total IN: 0 mL    OUT:    Voided (mL): 700 mL  Total OUT: 700 mL    Total NET: -700 mL                            PHYSICAL EXAM:  General: No distress  Respiratory: b/l air entry  Cardiovascular: S1 S2  Gastrointestinal: soft  Extremities:  no edema          LABORATORY:                        8.4    4.51  )-----------( 438      ( 11 Dec 2024 10:30 )             25.0     12-11    137  |  102  |  32[H]  ----------------------------<  89  4.8   |  28  |  0.84    Ca    10.8[H]      11 Dec 2024 10:30    TPro  7.3  /  Alb  2.1[L]  /  TBili  0.8  /  DBili  x   /  AST  63[H]  /  ALT  45  /  AlkPhos  132[H]  12-11    Sodium: 137 mmol/L (12-11 @ 10:30)  Sodium: 134 mmol/L (12-10 @ 07:30)    Potassium: 4.8 mmol/L (12-11 @ 10:30)  Potassium: 4.5 mmol/L (12-10 @ 07:30)    Hemoglobin: 8.4 g/dL (12-11 @ 10:30)  Hemoglobin: 6.5 g/dL (12-10 @ 07:30)  Hemoglobin: 7.0 g/dL (12-09 @ 10:40)    Creatinine, Serum 0.84 (12-11 @ 10:30)  Creatinine, Serum 0.97 (12-10 @ 07:30)  Creatinine, Serum 0.89 (12-09 @ 10:40)        LIVER FUNCTIONS - ( 11 Dec 2024 10:30 )  Alb: 2.1 g/dL / Pro: 7.3 g/dL / ALK PHOS: 132 U/L / ALT: 45 U/L / AST: 63 U/L / GGT: x           Urinalysis Basic - ( 11 Dec 2024 10:30 )    Color: x / Appearance: x / SG: x / pH: x  Gluc: 89 mg/dL / Ketone: x  / Bili: x / Urobili: x   Blood: x / Protein: x / Nitrite: x   Leuk Esterase: x / RBC: x / WBC x   Sq Epi: x / Non Sq Epi: x / Bacteria: x          < from: MR Abdomen w/wo IV Cont (12.09.24 @ 13:56) >    ACC: [id number] EXAM:  MR ABDOMEN WAW IC   ORDERED BY: STACIA MOTLEY     PROCEDURE DATE:  12/09/2024          INTERPRETATION:  CLINICAL INFORMATION: Liver and splenic mass. History of   renal transplant.    COMPARISON: MR abdomen 12/2/2024    CONTRAST/COMPLICATIONS:  IV Contrast: Gadavist  7.5 cc administered   0 cc discarded  Oral Contrast: NONE  .    PROCEDURE:  MRI of the abdomen was performed.  MRCP was performed.    FINDINGS:  LOWER CHEST: Cardiomegaly. Small left pleural effusion with adjacent   consolidation, which could represent atelectasis.    LIVER: Lobulated T2 hyperintense mass with mild heterogeneous enhancement   in segment 5/8, measuring 5.6 x 5.5 x 6.6 cm. The mass demonstrates   intense restricted diffusion with a central T2 hyperintense region that   has delayed enhancement. No discrete evidence of washout.  BILE DUCTS: Normal caliber.  GALLBLADDER: Within normal limits.  SPLEEN: Enlarged measuring 17.7 cm. Large heterogeneously enhancing mass   with diffusion restrictingcomponents, measuring 7.3 x 6.9 x 7.4 cm.  PANCREAS: Within normal limits.  ADRENALS: Within normal limits.  KIDNEYS/URETERS: Left nephrectomy. Atrophic right kidney without   hydronephrosis. Partially imaged right lower quadrant renal transplant.    VISUALIZED PORTIONS:  BOWEL: Within normal limits.  PERITONEUM: No ascites.  VESSELS: Within normal limits.  RETROPERITONEUM/LYMPH NODES: Unchanged partially imaged confluent   retroperitoneal soft tissue encasing the aorta and IVC, unchanged. No   lymphadenopathy.  ABDOMINAL WALL: Within normal limits.  BONES: Within normal limits.    IMPRESSION:  Solid enhancing masses in the liver and spleen. Differential diagnosis   includes lymphoma and metastatic disease. Primary hepatobiliary   malignancy is a less likely consideration for the liver lesion.    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    --- End of Report ---          HAKAN ALEMAN MD; Resident Radiologist  This document has been electronically signed.  WILFRIDO COWART MD; Attending Radiologist  This document has been electronically signed. Dec 11 2024  2:58PM    < end of copied text >

## 2024-12-11 NOTE — PROGRESS NOTE ADULT - SUBJECTIVE AND OBJECTIVE BOX
Patient is a 69y old  Male who presents with a chief complaint of AMS (10 Dec 2024 14:07)       INTERVAL HPI/OVERNIGHT EVENTS: Patient seen and examined at bedside. No new events/complaints noted.     MEDICATIONS  (STANDING):  amLODIPine   Tablet 10 milliGRAM(s) Oral daily  ascorbic acid 500 milliGRAM(s) Oral two times a day  azaTHIOprine 50 milliGRAM(s) Oral two times a day  buPROPion XL (24-Hour) . 300 milliGRAM(s) Oral daily  calcitonin Injectable 370 International Unit(s) IntraMuscular every 12 hours  carvedilol 12.5 milliGRAM(s) Oral every 12 hours  cloNIDine 0.1 milliGRAM(s) Oral three times a day  dextrose 5%. 1000 milliLiter(s) (50 mL/Hr) IV Continuous <Continuous>  dextrose 5%. 1000 milliLiter(s) (100 mL/Hr) IV Continuous <Continuous>  dextrose 50% Injectable 25 Gram(s) IV Push once  dextrose 50% Injectable 12.5 Gram(s) IV Push once  dextrose 50% Injectable 25 Gram(s) IV Push once  escitalopram 10 milliGRAM(s) Oral <User Schedule>  ferrous    sulfate 325 milliGRAM(s) Oral two times a day  glucagon  Injectable 1 milliGRAM(s) IntraMuscular once  hydrALAZINE 100 milliGRAM(s) Oral three times a day  insulin glargine Injectable (LANTUS) 24 Unit(s) SubCutaneous at bedtime  insulin lispro (ADMELOG) corrective regimen sliding scale   SubCutaneous three times a day before meals  insulin lispro Injectable (ADMELOG) 5 Unit(s) SubCutaneous three times a day before meals  levothyroxine 88 MICROGram(s) Oral <User Schedule>  lisinopril 40 milliGRAM(s) Oral daily  mineral oil enema 133 milliLiter(s) Rectal once  multivitamin/minerals/iron Oral Solution (CENTRUM) 15 milliLiter(s) Oral daily  pantoprazole    Tablet 40 milliGRAM(s) Oral two times a day  polyethylene glycol 3350 17 Gram(s) Oral daily  pyridoxine 50 milliGRAM(s) Oral daily  rosuvastatin 10 milliGRAM(s) Oral at bedtime  senna 2 Tablet(s) Oral at bedtime  tacrolimus 2 milliGRAM(s) Oral daily  tacrolimus 1 milliGRAM(s) Oral at bedtime    MEDICATIONS  (PRN):  aluminum hydroxide/magnesium hydroxide/simethicone Suspension 30 milliLiter(s) Oral every 4 hours PRN Dyspepsia  dextrose Oral Gel 15 Gram(s) Oral once PRN Blood Glucose LESS THAN 70 milliGRAM(s)/deciliter  hydrALAZINE Injectable 10 milliGRAM(s) IV Push every 8 hours PRN SBP >180 and HR 60-70bpm  melatonin 3 milliGRAM(s) Oral at bedtime PRN Insomnia  metoprolol tartrate Injectable 5 milliGRAM(s) IV Push every 6 hours PRN SBP >170  ondansetron Injectable 4 milliGRAM(s) IV Push every 8 hours PRN Nausea and/or Vomiting      Allergies    No Known Allergies    Intolerances        REVIEW OF SYSTEMS:  Per HPI. Patient does not answer most of the questions   Vital Signs Last 24 Hrs  T(C): 36.9 (11 Dec 2024 04:47), Max: 37.1 (10 Dec 2024 15:00)  T(F): 98.4 (11 Dec 2024 04:47), Max: 98.8 (10 Dec 2024 15:00)  HR: 63 (11 Dec 2024 04:47) (63 - 66)  BP: 136/66 (11 Dec 2024 04:47) (97/47 - 136/66)  BP(mean): --  RR: 18 (11 Dec 2024 04:47) (18 - 19)  SpO2: 98% (11 Dec 2024 04:47) (93% - 98%)    Parameters below as of 11 Dec 2024 04:47  Patient On (Oxygen Delivery Method): room air        PHYSICAL EXAM:  GENERAL: NAD,  no increased WOB  HEAD:  Atraumatic, Normocephalic  EYES: EOMI, conjunctiva and sclera clear  ENMT: Moist mucous membranes  NECK: Supple, No JVD  NERVOUS SYSTEM:  Alert   CHEST/LUNG: Clear to auscultation bilaterally; No rales, rhonchi, wheezing  HEART: Regular rate and rhythm  ABDOMEN: Soft, Nontender, Nondistended; Bowel sounds present  EXTREMITIES:   No clubbing, cyanosis  LABS:      Ca    11.4       10 Dec 2024 07:30        CAPILLARY BLOOD GLUCOSE      POCT Blood Glucose.: 168 mg/dL (10 Dec 2024 21:42)  POCT Blood Glucose.: 115 mg/dL (10 Dec 2024 16:53)  POCT Blood Glucose.: 162 mg/dL (10 Dec 2024 12:01)    BLOOD CULTURE    RADIOLOGY & ADDITIONAL TESTS:    Imaging Personally Reviewed:  [ ] YES     Consultant(s) Notes Reviewed:      Care Discussed with Consultants/Other Providers:

## 2024-12-11 NOTE — PROGRESS NOTE ADULT - ASSESSMENT
CKD 3, h/o Kidney transplant ~2012, h/o Left Nephrectomy  Diabetes  Hypertension  Sepsis, UTI, Sacral osteomyelitis, Gram negative bacteremia  Hypokalemia  Hypercalcemia, Elevated 1,25 Vitamin D (ACEI level: WNL), R/o Lymphoma  Hypomagnesemia  Hypophosphatemia  Anemia  + Liver mass    12/09/24: Stable renal indices. To continue current immuno suppressants. For MRI. Calcium levels slowly trending down. .  Monitor blood sugar levels. Insulin coverage as needed. Dietary restriction. Will lower lisinopril dose if BP remains low. Trend BP and titrate BP meds as needed.   Trend BP and titrate BP meds as needed. Hematology follow up. Will follow electrolytes and renal function trend.   12/10/24: Stable renal indices. Calcium levels now worsening again. Will dose steroids once blood sugar better controlled. Pt received aredia 12/06/24. MRI results pending.   12/11/24: Stable renal indices. MRI results noted. Calcium trending down. For ? liver biopsy.        CKD 3, h/o Kidney transplant ~2012, h/o Left Nephrectomy  Diabetes  Hypertension  Sepsis, UTI, Sacral osteomyelitis, Gram negative bacteremia  Hypokalemia  Hypercalcemia, Elevated 1,25 Vitamin D (ACEI level: WNL), R/o Lymphoma  Hypomagnesemia  Hypophosphatemia  Anemia  + Liver mass    12/09/24: Stable renal indices. To continue current immuno suppressants. For MRI. Calcium levels slowly trending down. .  Monitor blood sugar levels. Insulin coverage as needed. Dietary restriction. Will lower lisinopril dose if BP remains low. Trend BP and titrate BP meds as needed.   Trend BP and titrate BP meds as needed. Hematology follow up. Will follow electrolytes and renal function trend.   12/10/24: Stable renal indices. Calcium levels now worsening again. Will dose steroids once blood sugar better controlled. Pt received aredia 12/06/24. MRI results pending.   12/11/24: Stable renal indices. MRI results noted. Calcium trending down. For ? liver biopsy. Add steroids for hypercalcemia.

## 2024-12-11 NOTE — PROGRESS NOTE ADULT - ASSESSMENT
70yo M, PMH DM, HTN, HLD, h/o kidney transplant, hypothyroidism, presents to ED from [facility] for AMS, found to be febrile with positive UA. 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 Cardiology called for pericardial effusion.     Uncontrolled HTN/Pericardial Effusion  - CT chest: Small left pleural effusion with small loculated components, Small to moderate pericardial effusion  - TTE: EF 60%, trace pericardial effusion adjacent ot the posterior LV.  No significant valvular disease  - No signs of tamponade on examination    - No evidence of any meaningful volume overload   - He appears to be dry on exam. Encourage fluid/po intake, or IVF    - EKG with nonspecific TWI anteriorly, repeat unchanged  - No evidence of any active ischemia   - Continue statin   - now off ASA in setting of persistent anemia (neg FOBT), with Hgb ~ 6-7. hgb dropped to 6.5, pending today's lab  - continue to trend and transfuse per primary   - Heme and GI following for anemia    - BP labile and uncontrolled 90's - 150's systolics  - Continue max Norvasc 10, Hydralazine 100 TID and Lisinopril 40 dose   - Continue Clonidine increase to 0.1 mg PO TID   - Continue coreg 12.5mg BID   - continue current anti HTN med with hold parameters  - continue to monitor routine hemodynamics     - Ortho following for pathological sacral Fx with possible OM.   - No acute orthopedic surgical intervention at this time.  - Monitor and replete Lytes. Keep K > 4 and Mg > 2    - Will continue to follow.    RUSSELL Farias  Nurse Practitioner - Cardiology   call TEAMS   70yo M, PMH DM, HTN, HLD, h/o kidney transplant, hypothyroidism, presents to ED from [facility] for AMS, found to be febrile with positive UA. 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 Cardiology called for pericardial effusion.     Uncontrolled HTN/Pericardial Effusion  - CT chest: Small left pleural effusion with small loculated components, Small to moderate pericardial effusion  - TTE: EF 60%, trace pericardial effusion adjacent ot the posterior LV.  No significant valvular disease  - No signs of tamponade on examination    - No evidence of any meaningful volume overload   - He appears to be dry on exam. Encourage fluid/po intake, or IVF    - EKG with nonspecific TWI anteriorly, repeat unchanged  - No evidence of any active ischemia   - Continue statin   - now off ASA in setting of persistent anemia (neg FOBT), with Hgb ~ 6-7. hgb dropped to 6.5, pending today's lab  - continue to trend and transfuse per primary   - Heme and GI following for anemia    - BP labile and at times soft 90's - 150's systolics  - Continue max Norvasc 10,   and Lisinopril 40 dose   - Continue Clonidine 0.1 mg PO TID   - Continue coreg 12.5mg BID   - may need to decrease hydral  - continue to monitor routine hemodynamics     - Ortho following for pathological sacral Fx with possible OM.   - No acute orthopedic surgical intervention at this time.  - Monitor and replete Lytes. Keep K > 4 and Mg > 2    - Will continue to follow.    RUSSELL Farias  Nurse Practitioner - Cardiology   call TEAMS

## 2024-12-11 NOTE — PROGRESS NOTE ADULT - ASSESSMENT
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 I discussed the care with the patient.  Differential diagnosis and plan of care discussed with patient after the evaluation.  35 minutes spent on total encounter of which more than fifty percent of the encounter was spent counseling and/or coordinating care by the attending physician.

## 2024-12-11 NOTE — PROGRESS NOTE ADULT - ASSESSMENT
Patient is a 70yo M, PMH DM, HTN, HLD, h/o kidney transplant, hypothyroidism, presents to ED from [facility] for AMS. Patient is lethargic and unable to provide history at this time.    ESBL Bacteremia 2/2 Acute Cystitis  Sacral Wound/OM  Liver Mass w/ mets  Fevers  AMS 2/2 above  Febrile in the .5  UA positive for UTI  Flu/COVID/RSV negative  11/29 BCx ESBL E.coli , repeat negative  11/29 UCx   50,000 - 99,000 CFU/mL Escherichia coli    CT CAP w/ Small left pleural effusion with small loculated components  Right pelvic transplant kidney with mild fullness of the pelvic alyceal system. Stercoral proctitis.  Patchy sclerosis and osteolysis throughout the bilateral sacrum with pathologic fractures. There is soft tissue with stranding extending throughout the presacral and posterior extraperitoneal pelvic soft tissues.  There are a few bubbles of air/gas at the right sacral pathologic fracture, findings suggestive of infection/osteomyelitis.    MRI Again seen are comminuted bilateral sacral lona fractures with marked osseous edema and marked loss of normal T1 fatty marrow. Osseous edema with loss of normal T1 fatty marrow at thecaudal aspect of the left   posterior iliac bone adjacent to the sacroiliac joint. Previously seen fracture component is better visualized on CT. These findings could be secondary to extensive fracture deformities in an osteopenic patient   versus osteomyelitis if there was a history of a soft tissue ulcer extending to bone versus metastatic disease given the marked loss of T1 fatty marrow if there is a history of malignancy. Clinical correlation   with patient history is advised.    Reviewed w/ admitting attending, no sacral wound present. Suspect findings on sacral more related to mets from possible malignancy    Recommendations:   S/p ertapenem x10 day course  Monitor off ABx  Trend temps/WBC  Monitor Hgb, transfusion prn protocol  Surgery following  Per Ortho no surgical intervention for fracture of sacrum    Additional care per primary team    Please call with any further questions.  Vanessa Saul M.D.  [facility] Division of Infectious Diseases [phone]  For after 5 P.M. and weekends, please call [phone]  Available on [employer] TEAMS

## 2024-12-11 NOTE — PROGRESS NOTE ADULT - ASSESSMENT
Patient is a 68yo M, PMH DM, HTN, HLD, h/o kidney transplant, hypothyroidism, presents to ED from [facility] for AMS likely  acute  metabolic encephalopathy       AMS 2/2 Sepsis 2/2 multiple infections (UTI, sacral infection, possible osteomyelitis), E coli bacteremia   acute  metabolic encephalopathy   Sepsis 2/2 ESBL Ecoli UTI and bacteremia. sensitive to ertapenem.  -C/W Meropenum 1gm q24h x10 day course until 12/10 d/w  ID Dr. Saul with  midline   -Tylenol PRN pain and fever- diet as tolerated   -aspiration and fall risk - Continue Senna, Miralax, PRN dulcolax suppositories   -MR pelvis/sacrum to eval for osteomyelitis-  Again seen are comminuted bilateral sacral lona fractures with marked osseous edema and marked loss of normal T1 fatty marrow. Osseous edema with loss of normal T1 fatty marrow at the caudal aspect of the left posterior iliac bone adjacent to the sacroiliac joint. Previously seen fracture component is better visualized on CT. These findings could be secondary to extensive fracture deformities in an osteopenic patient versus osteomyelitis if there was a history of a soft tissue ulcer extending to bone versus metastatic disease given the marked loss of T1 fatty marrow if there is a history of malignancy. Clinical correlation with patient history is advised.  - Per Ortho no surgical intervention for fracture of sacrum  / needs dolores     Acute on Chronic anemia   - Likely due to infection,  and CKD- No signs of acute  bleeding noted   - Iron22/ sat low    iron deficiency with chr anemia  +  - S/p  1unit of pRBC today,  given hx of renal transplant will use irradiated PRBC   - Hem Dr. Sal following     Hepatic lesion  - CT abd with 5.5cm lesion on R hepatic lobe also noticed  retroperitoneal  fibrosis  and spleen mass   - histopath not fully reported by  [facility] in sept /2024 paper work   - hem/onc  DR SAL / and nephro dr vizcarra   would like to  repeat   liver biopsy by ir  after mri abd  - MRI abdomen reviewed   - pending films review by IR for possible repeat biopsy    Diabetes Mellitus / hyperglycemia  - A1c 7.4  - Pre meal Insulin  5 unit tid,   increased Lantus to 24 u QHS  - ISS  - carb controlled diet    HTN  - Continue Lisinopril 40 mg  daily   - Continue Hydralazine 100mg TID with hold parameters  - Continue Coreg, switched from Metoprolol - 12.5 mg po bid  - Continue Amlodipine 10mg daily -  - added on clonidine 0.1  mg tid   - fu bp closely     HLD  Continue Crestor 10mg daily    s/p Kidney transplant/CKD 3  - Continue Tacrolimus 2mg daily  - Continue Tacrolimus 1mg QHS  - Continue Azathioprine 50mg BID  - Nephrology consulted dr vizcarra     Hypothyroidism  - Continue Levothyroxine 88mcg QAM    Hypercalcemia  - possible  sec to  underlying  lymphoproliferative disorder  - sec to vit D  s/p  calcitonin   s/p   Aredia   - Calcitonin q 12 hrs X 3 doses  - Nephro following    Depression  Continue Escitalopram 10mg TID  Continue Bupropion 300mg daily        DVT ppx: Hold AC due to anemia and risk of bleeding         wife updated. Please Update wife, Ludmila Gann [phone] daily  Dispo: JOCELYNN bernal

## 2024-12-11 NOTE — PROGRESS NOTE ADULT - SUBJECTIVE AND OBJECTIVE BOX
[facility], Division of Infectious Diseases  WANG Mccoy Y. Patel, S. Shah, G. [facility]  [phone]    Name: JAN CALVIN  Age: 69y  Gender: Male  MRN: [id number]    Interval History:  No acute overnight events.   Notes reviewed    Antibiotics:      Medications:  aluminum hydroxide/magnesium hydroxide/simethicone Suspension 30 milliLiter(s) Oral every 4 hours PRN  amLODIPine   Tablet 10 milliGRAM(s) Oral daily  ascorbic acid 500 milliGRAM(s) Oral two times a day  azaTHIOprine 50 milliGRAM(s) Oral two times a day  buPROPion XL (24-Hour) . 300 milliGRAM(s) Oral daily  calcitonin Injectable 370 International Unit(s) IntraMuscular every 12 hours  carvedilol 12.5 milliGRAM(s) Oral every 12 hours  cloNIDine 0.1 milliGRAM(s) Oral three times a day  dextrose 5%. 1000 milliLiter(s) IV Continuous <Continuous>  dextrose 5%. 1000 milliLiter(s) IV Continuous <Continuous>  dextrose 50% Injectable 25 Gram(s) IV Push once  dextrose 50% Injectable 12.5 Gram(s) IV Push once  dextrose 50% Injectable 25 Gram(s) IV Push once  dextrose Oral Gel 15 Gram(s) Oral once PRN  escitalopram 10 milliGRAM(s) Oral <User Schedule>  ferrous    sulfate 325 milliGRAM(s) Oral two times a day  glucagon  Injectable 1 milliGRAM(s) IntraMuscular once  hydrALAZINE 100 milliGRAM(s) Oral three times a day  hydrALAZINE Injectable 10 milliGRAM(s) IV Push every 8 hours PRN  insulin glargine Injectable (LANTUS) 24 Unit(s) SubCutaneous at bedtime  insulin lispro (ADMELOG) corrective regimen sliding scale   SubCutaneous three times a day before meals  insulin lispro Injectable (ADMELOG) 5 Unit(s) SubCutaneous three times a day before meals  levothyroxine 88 MICROGram(s) Oral <User Schedule>  lisinopril 40 milliGRAM(s) Oral daily  melatonin 3 milliGRAM(s) Oral at bedtime PRN  metoprolol tartrate Injectable 5 milliGRAM(s) IV Push every 6 hours PRN  mineral oil enema 133 milliLiter(s) Rectal once  multivitamin/minerals/iron Oral Solution (CENTRUM) 15 milliLiter(s) Oral daily  ondansetron Injectable 4 milliGRAM(s) IV Push every 8 hours PRN  pantoprazole    Tablet 40 milliGRAM(s) Oral two times a day  polyethylene glycol 3350 17 Gram(s) Oral daily  pyridoxine 50 milliGRAM(s) Oral daily  rosuvastatin 10 milliGRAM(s) Oral at bedtime  senna 2 Tablet(s) Oral at bedtime  tacrolimus 2 milliGRAM(s) Oral daily  tacrolimus 1 milliGRAM(s) Oral at bedtime      Review of Systems:  unable to obtain    Allergies: No Known Allergies    For details regarding the patient's past medical history, social history, family history, and other miscellaneous elements, please refer the initial infectious diseases consultation and/or the admitting history and physical examination for this admission.    Objective:  Vitals:   T(C): 36.9 (12-11-24 @ 11:57), Max: 37.1 (12-10-24 @ 15:00)  HR: 65 (12-11-24 @ 11:57) (63 - 66)  BP: 160/65 (12-11-24 @ 11:57) (106/66 - 160/65)  RR: 19 (12-11-24 @ 11:57) (18 - 19)  SpO2: 96% (12-11-24 @ 11:57) (93% - 98%)    Physical Examination:  General: no acute distress  HEENT: NC/AT, EOMI,   Cardio: RRR  Resp: breath sounds heard bilaterally, no rales, wheezes or rhonchi  Abd: soft  Ext: no edema or cyanosis  Skin: warm, dry, no visible rash      Laboratory Studies:  CBC:                       8.4    4.51  )-----------( 438      ( 11 Dec 2024 10:30 )             25.0     CMP: 12-11    137  |  102  |  32[H]  ----------------------------<  89  4.8   |  28  |  0.84    Ca    10.8[H]      11 Dec 2024 10:30    TPro  7.3  /  Alb  2.1[L]  /  TBili  0.8  /  DBili  x   /  AST  63[H]  /  ALT  45  /  AlkPhos  132[H]  12-11    LIVER FUNCTIONS - ( 11 Dec 2024 10:30 )  Alb: 2.1 g/dL / Pro: 7.3 g/dL / ALK PHOS: 132 U/L / ALT: 45 U/L / AST: 63 U/L / GGT: x           Urinalysis Basic - ( 11 Dec 2024 10:30 )    Color: x / Appearance: x / SG: x / pH: x  Gluc: 89 mg/dL / Ketone: x  / Bili: x / Urobili: x   Blood: x / Protein: x / Nitrite: x   Leuk Esterase: x / RBC: x / WBC x   Sq Epi: x / Non Sq Epi: x / Bacteria: x        Microbiology: reviewed    Culture - Blood (collected 11-30-24 @ 07:05)  Source: .Blood BLOOD  Final Report (12-05-24 @ 13:00):    No growth at 5 days    Culture - Blood (collected 11-30-24 @ 07:00)  Source: .Blood BLOOD  Final Report (12-05-24 @ 13:00):    No growth at 5 days    Culture - Urine (collected 11-29-24 @ 11:00)  Source: Catheterized  Final Report (12-01-24 @ 10:17):    50,000 - 99,000 CFU/mL Escherichia coli ESBL  Organism: Escherichia coli ESBL (12-01-24 @ 10:17)  Organism: Escherichia coli ESBL (12-01-24 @ 10:17)      Method Type: CHRIST      -  Ampicillin: R >16 These ampicillin results predict results for amoxicillin      -  Ampicillin/Sulbactam: I 16/8      -  Aztreonam: R >16      -  Cefazolin: R >16 For uncomplicated UTI with K. pneumoniae, E. coli, or P. mirablis: CHRIST <=16 is sensitive and CHRIST >=32 is resistant. This also predicts results for oral agents cefaclor, cefdinir, cefpodoxime, cefprozil, cefuroxime axetil, cephalexin and locarbef for uncomplicated UTI. Note that some isolates may be susceptible to these agents while testing resistant to cefazolin.      -  Cefepime: R >16      -  Ceftriaxone: R >32      -  Cefuroxime: R >16      -  Ciprofloxacin: R >2      -  Ertapenem: S <=0.5      -  Gentamicin: R >8      -  Imipenem: S <=1      -  Levofloxacin: R 4      -  Meropenem: S <=1      -  Nitrofurantoin: S <=32 Should not be used to treat pyelonephritis      -  Piperacillin/Tazobactam: S <=8      -  Tobramycin: R >8      -  Trimethoprim/Sulfamethoxazole: R >2/38    Urinalysis with Rflx Culture (collected 11-29-24 @ 11:00)    Culture - Blood (collected 11-29-24 @ 07:15)  Source: .Blood BLOOD  Gram Stain (11-29-24 @ 21:31):    Growth in aerobic bottle: Gram Negative Rods    Growth in anaerobic bottle: Gram Negative Rods  Final Report (12-01-24 @ 17:40):    Growth in aerobic and anaerobic bottles: Escherichia coli ESBL  Organism: Escherichia coli ESBL (12-01-24 @ 17:40)  Organism: Escherichia coli ESBL (12-01-24 @ 17:40)      Method Type: CHRIST      -  Ampicillin: R >16 These ampicillin results predict results for amoxicillin      -  Ampicillin/Sulbactam: R >16/8      -  Aztreonam: R >16      -  Cefazolin: R >16      -  Cefepime: R >16      -  Ceftriaxone: R >32      -  Ciprofloxacin: R >2      -  Ertapenem: S <=0.5      -  Gentamicin: R >8      -  Imipenem: S <=1      -  Levofloxacin: R 4      -  Meropenem: S <=1      -  Piperacillin/Tazobactam: R <=8      -  Tobramycin: R >8      -  Trimethoprim/Sulfamethoxazole: R >2/38    Culture - Blood (collected 11-29-24 @ 07:10)  Source: .Blood BLOOD  Gram Stain (11-29-24 @ 22:18):    Growth in anaerobic bottle: Gram Negative Rods    Growth in aerobic bottle: Gram Negative Rods  Final Report (12-01-24 @ 17:41):    Growth in aerobic and anaerobic bottles: Escherichia coli ESBL    Direct identification is available within approximately 3-5    hours either by Blood Panel Multiplexed PCR or Direct    MALDI-TOF. Details: [email or web address]  Organism: Blood Culture PCR (12-01-24 @ 17:41)  Organism: Blood Culture PCR (12-01-24 @ 17:41)      Method Type: PCR      -  Escherichia coli: Detec      -  ESBL: Detec      -  CTX-M Resistance Marker: Detec          Radiology: reviewed

## 2024-12-11 NOTE — PROGRESS NOTE ADULT - SUBJECTIVE AND OBJECTIVE BOX
[facility]  Kaz Bermeo PA-C  [address] [place] [address]  [phone]      INTERVAL HPI/OVERNIGHT EVENTS:  Pt s/e  Awaiting MRI report and AM labs    MEDICATIONS  (STANDING):  amLODIPine   Tablet 10 milliGRAM(s) Oral daily  ascorbic acid 500 milliGRAM(s) Oral two times a day  azaTHIOprine 50 milliGRAM(s) Oral two times a day  buPROPion XL (24-Hour) . 300 milliGRAM(s) Oral daily  calcitonin Injectable 370 International Unit(s) IntraMuscular every 12 hours  carvedilol 12.5 milliGRAM(s) Oral every 12 hours  cloNIDine 0.1 milliGRAM(s) Oral three times a day  dextrose 5%. 1000 milliLiter(s) (100 mL/Hr) IV Continuous <Continuous>  dextrose 5%. 1000 milliLiter(s) (50 mL/Hr) IV Continuous <Continuous>  dextrose 50% Injectable 25 Gram(s) IV Push once  dextrose 50% Injectable 12.5 Gram(s) IV Push once  dextrose 50% Injectable 25 Gram(s) IV Push once  escitalopram 10 milliGRAM(s) Oral <User Schedule>  ferrous    sulfate 325 milliGRAM(s) Oral two times a day  glucagon  Injectable 1 milliGRAM(s) IntraMuscular once  hydrALAZINE 100 milliGRAM(s) Oral three times a day  insulin glargine Injectable (LANTUS) 24 Unit(s) SubCutaneous at bedtime  insulin lispro (ADMELOG) corrective regimen sliding scale   SubCutaneous three times a day before meals  insulin lispro Injectable (ADMELOG) 5 Unit(s) SubCutaneous three times a day before meals  levothyroxine 88 MICROGram(s) Oral <User Schedule>  lisinopril 40 milliGRAM(s) Oral daily  mineral oil enema 133 milliLiter(s) Rectal once  multivitamin/minerals/iron Oral Solution (CENTRUM) 15 milliLiter(s) Oral daily  pantoprazole    Tablet 40 milliGRAM(s) Oral two times a day  polyethylene glycol 3350 17 Gram(s) Oral daily  pyridoxine 50 milliGRAM(s) Oral daily  rosuvastatin 10 milliGRAM(s) Oral at bedtime  senna 2 Tablet(s) Oral at bedtime  tacrolimus 2 milliGRAM(s) Oral daily  tacrolimus 1 milliGRAM(s) Oral at bedtime    MEDICATIONS  (PRN):  aluminum hydroxide/magnesium hydroxide/simethicone Suspension 30 milliLiter(s) Oral every 4 hours PRN Dyspepsia  dextrose Oral Gel 15 Gram(s) Oral once PRN Blood Glucose LESS THAN 70 milliGRAM(s)/deciliter  hydrALAZINE Injectable 10 milliGRAM(s) IV Push every 8 hours PRN SBP >180 and HR 60-70bpm  melatonin 3 milliGRAM(s) Oral at bedtime PRN Insomnia  metoprolol tartrate Injectable 5 milliGRAM(s) IV Push every 6 hours PRN SBP >170  ondansetron Injectable 4 milliGRAM(s) IV Push every 8 hours PRN Nausea and/or Vomiting      Allergies  No Known Allergies    PHYSICAL EXAM:   Vital Signs:  Vital Signs Last 24 Hrs  T(C): 36.9 (11 Dec 2024 04:47), Max: 37.1 (10 Dec 2024 15:00)  T(F): 98.4 (11 Dec 2024 04:47), Max: 98.8 (10 Dec 2024 15:00)  HR: 63 (11 Dec 2024 04:47) (63 - 66)  BP: 136/66 (11 Dec 2024 04:47) (97/47 - 136/66)  BP(mean): --  RR: 18 (11 Dec 2024 04:47) (18 - 19)  SpO2: 98% (11 Dec 2024 04:47) (93% - 98%)    Parameters below as of 11 Dec 2024 04:47  Patient On (Oxygen Delivery Method): room air      Daily     Daily Weight in k.6 (11 Dec 2024 04:47)    GENERAL:  Appears stated age  HEENT:  NC/AT  CHEST:  Full & symmetric excursion  HEART:  Regular rhythm  ABDOMEN:  Soft, non-tender, non-distended  EXTEREMITIES:  no cyanosis  SKIN:  No rash  NEURO:  Lethargic      LABS:                        6.5    5.38  )-----------( 336      ( 10 Dec 2024 07:30 )             19.8     12-10    134[L]  |  100  |  32[H]  ----------------------------<  203[H]  4.5   |  28  |  0.97    Ca    11.4[H]      10 Dec 2024 07:30        Urinalysis Basic - ( 10 Dec 2024 07:30 )    Color: x / Appearance: x / SG: x / pH: x  Gluc: 203 mg/dL / Ketone: x  / Bili: x / Urobili: x   Blood: x / Protein: x / Nitrite: x   Leuk Esterase: x / RBC: x / WBC x   Sq Epi: x / Non Sq Epi: x / Bacteria: x

## 2024-12-12 LAB
ALBUMIN SERPL ELPH-MCNC: 2.1 G/DL — LOW (ref 3.3–5)
ALP SERPL-CCNC: 136 U/L — HIGH (ref 40–120)
ALT FLD-CCNC: 47 U/L — SIGNIFICANT CHANGE UP (ref 12–78)
ANION GAP SERPL CALC-SCNC: 5 MMOL/L — SIGNIFICANT CHANGE UP (ref 5–17)
ANION GAP SERPL CALC-SCNC: 7 MMOL/L — SIGNIFICANT CHANGE UP (ref 5–17)
AST SERPL-CCNC: 51 U/L — HIGH (ref 15–37)
BASOPHILS # BLD AUTO: 0.01 K/UL — SIGNIFICANT CHANGE UP (ref 0–0.2)
BASOPHILS NFR BLD AUTO: 0.3 % — SIGNIFICANT CHANGE UP (ref 0–2)
BILIRUB SERPL-MCNC: 0.7 MG/DL — SIGNIFICANT CHANGE UP (ref 0.2–1.2)
BUN SERPL-MCNC: 38 MG/DL — HIGH (ref 7–23)
BUN SERPL-MCNC: 40 MG/DL — HIGH (ref 7–23)
CALCIUM SERPL-MCNC: 10.7 MG/DL — HIGH (ref 8.5–10.1)
CALCIUM SERPL-MCNC: 11.2 MG/DL — HIGH (ref 8.5–10.1)
CHLORIDE SERPL-SCNC: 102 MMOL/L — SIGNIFICANT CHANGE UP (ref 96–108)
CHLORIDE SERPL-SCNC: 102 MMOL/L — SIGNIFICANT CHANGE UP (ref 96–108)
CO2 SERPL-SCNC: 25 MMOL/L — SIGNIFICANT CHANGE UP (ref 22–31)
CO2 SERPL-SCNC: 26 MMOL/L — SIGNIFICANT CHANGE UP (ref 22–31)
CREAT SERPL-MCNC: 0.82 MG/DL — SIGNIFICANT CHANGE UP (ref 0.5–1.3)
CREAT SERPL-MCNC: 0.91 MG/DL — SIGNIFICANT CHANGE UP (ref 0.5–1.3)
EGFR: 92 ML/MIN/1.73M2 — SIGNIFICANT CHANGE UP
EGFR: 96 ML/MIN/1.73M2 — SIGNIFICANT CHANGE UP
EOSINOPHIL # BLD AUTO: 0 K/UL — SIGNIFICANT CHANGE UP (ref 0–0.5)
EOSINOPHIL NFR BLD AUTO: 0 % — SIGNIFICANT CHANGE UP (ref 0–6)
GLUCOSE SERPL-MCNC: 290 MG/DL — HIGH (ref 70–99)
GLUCOSE SERPL-MCNC: 333 MG/DL — HIGH (ref 70–99)
HCT VFR BLD CALC: 26.9 % — LOW (ref 39–50)
HGB BLD-MCNC: 8.8 G/DL — LOW (ref 13–17)
IMM GRANULOCYTES NFR BLD AUTO: 1.3 % — HIGH (ref 0–0.9)
LYMPHOCYTES # BLD AUTO: 0.21 K/UL — LOW (ref 1–3.3)
LYMPHOCYTES # BLD AUTO: 5.6 % — LOW (ref 13–44)
MCHC RBC-ENTMCNC: 29.5 PG — SIGNIFICANT CHANGE UP (ref 27–34)
MCHC RBC-ENTMCNC: 32.7 G/DL — SIGNIFICANT CHANGE UP (ref 32–36)
MCV RBC AUTO: 90.3 FL — SIGNIFICANT CHANGE UP (ref 80–100)
MONOCYTES # BLD AUTO: 0.29 K/UL — SIGNIFICANT CHANGE UP (ref 0–0.9)
MONOCYTES NFR BLD AUTO: 7.7 % — SIGNIFICANT CHANGE UP (ref 2–14)
NEUTROPHILS # BLD AUTO: 3.21 K/UL — SIGNIFICANT CHANGE UP (ref 1.8–7.4)
NEUTROPHILS NFR BLD AUTO: 85.1 % — HIGH (ref 43–77)
NRBC # BLD: 0 /100 WBCS — SIGNIFICANT CHANGE UP (ref 0–0)
PLATELET # BLD AUTO: 453 K/UL — HIGH (ref 150–400)
POTASSIUM SERPL-MCNC: 5 MMOL/L — SIGNIFICANT CHANGE UP (ref 3.5–5.3)
POTASSIUM SERPL-MCNC: 5.2 MMOL/L — SIGNIFICANT CHANGE UP (ref 3.5–5.3)
POTASSIUM SERPL-MCNC: 5.6 MMOL/L — HIGH (ref 3.5–5.3)
POTASSIUM SERPL-SCNC: 5 MMOL/L — SIGNIFICANT CHANGE UP (ref 3.5–5.3)
POTASSIUM SERPL-SCNC: 5.2 MMOL/L — SIGNIFICANT CHANGE UP (ref 3.5–5.3)
POTASSIUM SERPL-SCNC: 5.6 MMOL/L — HIGH (ref 3.5–5.3)
PROT SERPL-MCNC: 7.3 G/DL — SIGNIFICANT CHANGE UP (ref 6–8.3)
PTH RELATED PROT SERPL-MCNC: <2 PMOL/L — SIGNIFICANT CHANGE UP
RBC # BLD: 2.98 M/UL — LOW (ref 4.2–5.8)
RBC # FLD: 19.7 % — HIGH (ref 10.3–14.5)
SODIUM SERPL-SCNC: 133 MMOL/L — LOW (ref 135–145)
SODIUM SERPL-SCNC: 134 MMOL/L — LOW (ref 135–145)
WBC # BLD: 3.77 K/UL — LOW (ref 3.8–10.5)
WBC # FLD AUTO: 3.77 K/UL — LOW (ref 3.8–10.5)

## 2024-12-12 PROCEDURE — 99232 SBSQ HOSP IP/OBS MODERATE 35: CPT

## 2024-12-12 PROCEDURE — 99233 SBSQ HOSP IP/OBS HIGH 50: CPT

## 2024-12-12 RX ORDER — SODIUM CHLORIDE 9 MG/ML
1000 INJECTION, SOLUTION INTRAMUSCULAR; INTRAVENOUS; SUBCUTANEOUS
Refills: 0 | Status: DISCONTINUED | OUTPATIENT
Start: 2024-12-12 | End: 2024-12-13

## 2024-12-12 RX ORDER — INSULIN GLARGINE 100 [IU]/ML
24 INJECTION, SOLUTION SUBCUTANEOUS AT BEDTIME
Refills: 0 | Status: DISCONTINUED | OUTPATIENT
Start: 2024-12-12 | End: 2024-12-14

## 2024-12-12 RX ORDER — 0.9 % SODIUM CHLORIDE 0.9 %
1000 INTRAVENOUS SOLUTION INTRAVENOUS
Refills: 0 | Status: DISCONTINUED | OUTPATIENT
Start: 2024-12-12 | End: 2024-12-12

## 2024-12-12 RX ORDER — SODIUM ZIRCONIUM CYCLOSILICATE 5 G/5G
5 POWDER, FOR SUSPENSION ORAL
Refills: 0 | Status: DISCONTINUED | OUTPATIENT
Start: 2024-12-12 | End: 2024-12-16

## 2024-12-12 RX ORDER — SODIUM ZIRCONIUM CYCLOSILICATE 5 G/5G
10 POWDER, FOR SUSPENSION ORAL ONCE
Refills: 0 | Status: COMPLETED | OUTPATIENT
Start: 2024-12-12 | End: 2024-12-12

## 2024-12-12 RX ADMIN — INSULIN GLARGINE 18 UNIT(S): 100 INJECTION, SOLUTION SUBCUTANEOUS at 21:52

## 2024-12-12 RX ADMIN — Medication 50 MILLIGRAM(S): at 12:42

## 2024-12-12 RX ADMIN — ESCITALOPRAM OXALATE 10 MILLIGRAM(S): 10 TABLET, FILM COATED ORAL at 12:46

## 2024-12-12 RX ADMIN — ESCITALOPRAM OXALATE 10 MILLIGRAM(S): 10 TABLET, FILM COATED ORAL at 19:55

## 2024-12-12 RX ADMIN — CLONIDINE HYDROCHLORIDE 0.1 MILLIGRAM(S): 0.3 TABLET ORAL at 05:31

## 2024-12-12 RX ADMIN — Medication 325 MILLIGRAM(S): at 19:53

## 2024-12-12 RX ADMIN — Medication 300 MILLIGRAM(S): at 12:41

## 2024-12-12 RX ADMIN — LISINOPRIL 40 MILLIGRAM(S): 20 TABLET ORAL at 05:31

## 2024-12-12 RX ADMIN — Medication 15 MILLILITER(S): at 12:40

## 2024-12-12 RX ADMIN — Medication 88 MICROGRAM(S): at 05:35

## 2024-12-12 RX ADMIN — CARVEDILOL 12.5 MILLIGRAM(S): 25 TABLET, FILM COATED ORAL at 05:31

## 2024-12-12 RX ADMIN — CLONIDINE HYDROCHLORIDE 0.1 MILLIGRAM(S): 0.3 TABLET ORAL at 21:32

## 2024-12-12 RX ADMIN — Medication 10: at 08:14

## 2024-12-12 RX ADMIN — Medication 50 MILLILITER(S): at 08:58

## 2024-12-12 RX ADMIN — Medication 325 MILLIGRAM(S): at 05:31

## 2024-12-12 RX ADMIN — SODIUM CHLORIDE 50 MILLILITER(S): 9 INJECTION, SOLUTION INTRAMUSCULAR; INTRAVENOUS; SUBCUTANEOUS at 10:29

## 2024-12-12 RX ADMIN — PREDNISONE 20 MILLIGRAM(S): 20 TABLET ORAL at 05:32

## 2024-12-12 RX ADMIN — AZATHIOPRINE 50 MILLIGRAM(S): 100 TABLET ORAL at 19:52

## 2024-12-12 RX ADMIN — ROSUVASTATIN CALCIUM 10 MILLIGRAM(S): 5 TABLET, FILM COATED ORAL at 21:29

## 2024-12-12 RX ADMIN — PANTOPRAZOLE SODIUM 40 MILLIGRAM(S): 40 TABLET, DELAYED RELEASE ORAL at 19:53

## 2024-12-12 RX ADMIN — ESCITALOPRAM OXALATE 10 MILLIGRAM(S): 10 TABLET, FILM COATED ORAL at 05:35

## 2024-12-12 RX ADMIN — Medication 500 MILLIGRAM(S): at 19:52

## 2024-12-12 RX ADMIN — PANTOPRAZOLE SODIUM 40 MILLIGRAM(S): 40 TABLET, DELAYED RELEASE ORAL at 05:31

## 2024-12-12 RX ADMIN — Medication 2 TABLET(S): at 21:29

## 2024-12-12 RX ADMIN — TACROLIMUS 2 MILLIGRAM(S): 5 CAPSULE ORAL at 12:41

## 2024-12-12 RX ADMIN — CLONIDINE HYDROCHLORIDE 0.1 MILLIGRAM(S): 0.3 TABLET ORAL at 14:10

## 2024-12-12 RX ADMIN — HYDRALAZINE HYDROCHLORIDE 100 MILLIGRAM(S): 10 TABLET ORAL at 21:32

## 2024-12-12 RX ADMIN — POLYETHYLENE GLYCOL 3350 17 GRAM(S): 17 POWDER, FOR SOLUTION ORAL at 12:41

## 2024-12-12 RX ADMIN — SODIUM ZIRCONIUM CYCLOSILICATE 10 GRAM(S): 5 POWDER, FOR SUSPENSION ORAL at 10:11

## 2024-12-12 RX ADMIN — Medication 8: at 17:11

## 2024-12-12 RX ADMIN — HYDRALAZINE HYDROCHLORIDE 100 MILLIGRAM(S): 10 TABLET ORAL at 05:31

## 2024-12-12 RX ADMIN — HYDRALAZINE HYDROCHLORIDE 100 MILLIGRAM(S): 10 TABLET ORAL at 14:09

## 2024-12-12 RX ADMIN — Medication 500 MILLIGRAM(S): at 05:32

## 2024-12-12 RX ADMIN — Medication 6: at 12:02

## 2024-12-12 RX ADMIN — AMLODIPINE BESYLATE 10 MILLIGRAM(S): 10 TABLET ORAL at 05:32

## 2024-12-12 RX ADMIN — AZATHIOPRINE 50 MILLIGRAM(S): 100 TABLET ORAL at 05:34

## 2024-12-12 RX ADMIN — Medication 5 UNIT(S): at 17:11

## 2024-12-12 RX ADMIN — TACROLIMUS 1 MILLIGRAM(S): 5 CAPSULE ORAL at 21:29

## 2024-12-12 NOTE — CHART NOTE - NSCHARTNOTEFT_GEN_A_CORE
Met with wife at bedside. Updated her about plan of biopsy deferred till Monday due to hyperkalemia and patient required Lokhelma. Per IR will schedule for Monday. She agreed with Plan

## 2024-12-12 NOTE — PROGRESS NOTE ADULT - SUBJECTIVE AND OBJECTIVE BOX
Patient is a 69y old  Male who presents with a chief complaint of AMS (11 Dec 2024 15:52)       INTERVAL HPI/OVERNIGHT EVENTS: Patient seen and examined at bedside. Awake, answers some of the questions     MEDICATIONS  (STANDING):  amLODIPine   Tablet 10 milliGRAM(s) Oral daily  ascorbic acid 500 milliGRAM(s) Oral two times a day  azaTHIOprine 50 milliGRAM(s) Oral two times a day  buPROPion XL (24-Hour) . 300 milliGRAM(s) Oral daily  carvedilol 12.5 milliGRAM(s) Oral every 12 hours  cloNIDine 0.1 milliGRAM(s) Oral three times a day  dextrose 5%. 1000 milliLiter(s) (100 mL/Hr) IV Continuous <Continuous>  dextrose 5%. 1000 milliLiter(s) (50 mL/Hr) IV Continuous <Continuous>  dextrose 50% Injectable 25 Gram(s) IV Push once  dextrose 50% Injectable 12.5 Gram(s) IV Push once  dextrose 50% Injectable 25 Gram(s) IV Push once  escitalopram 10 milliGRAM(s) Oral <User Schedule>  ferrous    sulfate 325 milliGRAM(s) Oral two times a day  glucagon  Injectable 1 milliGRAM(s) IntraMuscular once  hydrALAZINE 100 milliGRAM(s) Oral three times a day  insulin glargine Injectable (LANTUS) 24 Unit(s) SubCutaneous at bedtime  insulin lispro (ADMELOG) corrective regimen sliding scale   SubCutaneous every 6 hours  insulin lispro (ADMELOG) corrective regimen sliding scale   SubCutaneous three times a day before meals  lactated ringers. 1000 milliLiter(s) (50 mL/Hr) IV Continuous <Continuous>  levothyroxine 88 MICROGram(s) Oral <User Schedule>  lisinopril 40 milliGRAM(s) Oral daily  mineral oil enema 133 milliLiter(s) Rectal once  multivitamin/minerals/iron Oral Solution (CENTRUM) 15 milliLiter(s) Oral daily  pantoprazole    Tablet 40 milliGRAM(s) Oral two times a day  polyethylene glycol 3350 17 Gram(s) Oral daily  predniSONE   Tablet 20 milliGRAM(s) Oral daily  pyridoxine 50 milliGRAM(s) Oral daily  rosuvastatin 10 milliGRAM(s) Oral at bedtime  senna 2 Tablet(s) Oral at bedtime  tacrolimus 2 milliGRAM(s) Oral daily  tacrolimus 1 milliGRAM(s) Oral at bedtime    MEDICATIONS  (PRN):  aluminum hydroxide/magnesium hydroxide/simethicone Suspension 30 milliLiter(s) Oral every 4 hours PRN Dyspepsia  dextrose Oral Gel 15 Gram(s) Oral once PRN Blood Glucose LESS THAN 70 milliGRAM(s)/deciliter  hydrALAZINE Injectable 10 milliGRAM(s) IV Push every 8 hours PRN SBP >180 and HR 60-70bpm  melatonin 3 milliGRAM(s) Oral at bedtime PRN Insomnia  metoprolol tartrate Injectable 5 milliGRAM(s) IV Push every 6 hours PRN SBP >170  ondansetron Injectable 4 milliGRAM(s) IV Push every 8 hours PRN Nausea and/or Vomiting      Allergies    No Known Allergies    Intolerances        REVIEW OF SYSTEMS:  Unable to obtain due to confusion   Vital Signs Last 24 Hrs  T(C): 36.3 (12 Dec 2024 04:55), Max: 37 (11 Dec 2024 20:52)  T(F): 97.4 (12 Dec 2024 04:55), Max: 98.6 (11 Dec 2024 20:52)  HR: 62 (12 Dec 2024 04:55) (62 - 68)  BP: 182/67 (12 Dec 2024 04:55) (122/69 - 182/67)  BP(mean): --  RR: 18 (12 Dec 2024 04:55) (17 - 19)  SpO2: 94% (12 Dec 2024 04:55) (94% - 97%)    Parameters below as of 12 Dec 2024 04:55  Patient On (Oxygen Delivery Method): room air        PHYSICAL EXAM:  GENERAL: NAD,  confused, calm   HEAD:  Atraumatic, Normocephalic  EYES: EOMI, conjunctiva and sclera clear  ENMT: Moist mucous membranes  NECK: Supple, No JVD  NERVOUS SYSTEM:  Alert   CHEST/LUNG: Clear to auscultation bilaterally; No rales, rhonchi, wheezing  HEART: Regular rate and rhythm  ABDOMEN: Soft, Nontender, Nondistended; Bowel sounds present  EXTREMITIES:   No clubbing, cyanosis  LABS:                        8.8    3.77  )-----------( 453      ( 12 Dec 2024 06:25 )             26.9     12 Dec 2024 06:25    133    |  102    |  40     ----------------------------<  333    5.6     |  26     |  0.91     Ca    11.2       12 Dec 2024 06:25    TPro  7.3    /  Alb  2.1    /  TBili  0.7    /  DBili  x      /  AST  51     /  ALT  47     /  AlkPhos  136    12 Dec 2024 06:25      CAPILLARY BLOOD GLUCOSE      POCT Blood Glucose.: 360 mg/dL (12 Dec 2024 07:36)  POCT Blood Glucose.: 225 mg/dL (11 Dec 2024 21:39)  POCT Blood Glucose.: 118 mg/dL (11 Dec 2024 16:48)  POCT Blood Glucose.: 112 mg/dL (11 Dec 2024 11:25)  POCT Blood Glucose.: 96 mg/dL (11 Dec 2024 08:34)    BLOOD CULTURE    RADIOLOGY & ADDITIONAL TESTS:    Imaging Personally Reviewed:  [ ] YES     Consultant(s) Notes Reviewed:      Care Discussed with Consultants/Other Providers:

## 2024-12-12 NOTE — PROGRESS NOTE ADULT - SUBJECTIVE AND OBJECTIVE BOX
[facility] -- Tom Rahman, Osorio Castillo Savella, , Lisa Covington  Office # [phone]    Follow Up:  Uncontrolled HTN, Pericardial Effusion    Subjective/Observations:  Seen asleep but arousable to name-calling but barely verbal.  Unable to provide ROS.  Comfortable on RA.  Not in any form of distress.  No overnight events     REVIEW OF SYSTEMS: All other review of systems is negative unless indicated above  PAST MEDICAL & SURGICAL HISTORY:  Diabetes Mellitus Type II  Insulin pump (2010)      GERD (gastroesophageal reflux disease)      Depression      Hypothyroidism      Hyperlipidemia      Kidney transplanted  2012      Hypertension      ANGELIKA (obstructive sleep apnea)      Peripheral neuropathy      Shoulder fracture  Left.      History of colonoscopy      S/P kidney transplant  2012      S/P cholecystectomy      Retroperitoneal fibrosis  s/p surgery      AV fistula  Right arm      S/P right cataract extraction      S/P left cataract extraction      H/O unilateral nephrectomy  Left        MEDICATIONS  (STANDING):  amLODIPine   Tablet 10 milliGRAM(s) Oral daily  ascorbic acid 500 milliGRAM(s) Oral two times a day  azaTHIOprine 50 milliGRAM(s) Oral two times a day  buPROPion XL (24-Hour) . 300 milliGRAM(s) Oral daily  carvedilol 12.5 milliGRAM(s) Oral every 12 hours  cloNIDine 0.1 milliGRAM(s) Oral three times a day  dextrose 5%. 1000 milliLiter(s) (50 mL/Hr) IV Continuous <Continuous>  dextrose 5%. 1000 milliLiter(s) (100 mL/Hr) IV Continuous <Continuous>  dextrose 50% Injectable 25 Gram(s) IV Push once  dextrose 50% Injectable 12.5 Gram(s) IV Push once  dextrose 50% Injectable 25 Gram(s) IV Push once  escitalopram 10 milliGRAM(s) Oral <User Schedule>  ferrous    sulfate 325 milliGRAM(s) Oral two times a day  glucagon  Injectable 1 milliGRAM(s) IntraMuscular once  hydrALAZINE 100 milliGRAM(s) Oral three times a day  insulin glargine Injectable (LANTUS) 24 Unit(s) SubCutaneous at bedtime  insulin lispro (ADMELOG) corrective regimen sliding scale   SubCutaneous every 6 hours  lactated ringers. 1000 milliLiter(s) (50 mL/Hr) IV Continuous <Continuous>  levothyroxine 88 MICROGram(s) Oral <User Schedule>  lisinopril 40 milliGRAM(s) Oral daily  mineral oil enema 133 milliLiter(s) Rectal once  multivitamin/minerals/iron Oral Solution (CENTRUM) 15 milliLiter(s) Oral daily  pantoprazole    Tablet 40 milliGRAM(s) Oral two times a day  polyethylene glycol 3350 17 Gram(s) Oral daily  predniSONE   Tablet 20 milliGRAM(s) Oral daily  pyridoxine 50 milliGRAM(s) Oral daily  rosuvastatin 10 milliGRAM(s) Oral at bedtime  senna 2 Tablet(s) Oral at bedtime  tacrolimus 2 milliGRAM(s) Oral daily  tacrolimus 1 milliGRAM(s) Oral at bedtime    MEDICATIONS  (PRN):  aluminum hydroxide/magnesium hydroxide/simethicone Suspension 30 milliLiter(s) Oral every 4 hours PRN Dyspepsia  dextrose Oral Gel 15 Gram(s) Oral once PRN Blood Glucose LESS THAN 70 milliGRAM(s)/deciliter  hydrALAZINE Injectable 10 milliGRAM(s) IV Push every 8 hours PRN SBP >180 and HR 60-70bpm  melatonin 3 milliGRAM(s) Oral at bedtime PRN Insomnia  metoprolol tartrate Injectable 5 milliGRAM(s) IV Push every 6 hours PRN SBP >170  ondansetron Injectable 4 milliGRAM(s) IV Push every 8 hours PRN Nausea and/or Vomiting    Allergies    No Known Allergies    Intolerances      Vital Signs Last 24 Hrs  T(C): 36.3 (12 Dec 2024 04:55), Max: 37 (11 Dec 2024 20:52)  T(F): 97.4 (12 Dec 2024 04:55), Max: 98.6 (11 Dec 2024 20:52)  HR: 62 (12 Dec 2024 04:55) (62 - 68)  BP: 182/67 (12 Dec 2024 04:55) (122/69 - 182/67)  BP(mean): --  RR: 18 (12 Dec 2024 04:55) (17 - 19)  SpO2: 94% (12 Dec 2024 04:55) (94% - 97%)    Parameters below as of 12 Dec 2024 04:55  Patient On (Oxygen Delivery Method): room air      I&O's Summary    11 Dec 2024 07:01  -  12 Dec 2024 07:00  --------------------------------------------------------  IN: 0 mL / OUT: 2000 mL / NET: -2000 mL     PHYSICAL EXAM:  TELE: Not on tele  Constitutional: NAD, asleep but arousable.  Unable to provide ROS  HEENT: Moist Mucous Membranes, Anicteric  Pulmonary: Non-labored, breath sounds are clear bilaterally, No wheezing, rales or rhonchi  Cardiovascular: Regular, S1 and S2, No murmurs, rubs, gallops or clicks  Gastrointestinal: Bowel Sounds present, soft, nontender.   Lymph: No peripheral edema. No lymphadenopathy.  Skin: No visible rashes or ulcers.  Psych:  Mood & affect: Not verbally responding  LABS: All Labs Reviewed:                        8.8    3.77  )-----------( 453      ( 12 Dec 2024 06:25 )             26.9                         8.4    4.51  )-----------( 438      ( 11 Dec 2024 10:30 )             25.0                         6.5    5.38  )-----------( 336      ( 10 Dec 2024 07:30 )             19.8     12 Dec 2024 06:25    133    |  102    |  40     ----------------------------<  333    5.6     |  26     |  0.91   11 Dec 2024 10:30    137    |  102    |  32     ----------------------------<  89     4.8     |  28     |  0.84   10 Dec 2024 07:30    134    |  100    |  32     ----------------------------<  203    4.5     |  28     |  0.97     Ca    11.2       12 Dec 2024 06:25  Ca    10.8       11 Dec 2024 10:30  Ca    11.4       10 Dec 2024 07:30    TPro  7.3    /  Alb  2.1    /  TBili  0.7    /  DBili  x      /  AST  51     /  ALT  47     /  AlkPhos  136    12 Dec 2024 06:25  TPro  7.3    /  Alb  2.1    /  TBili  0.8    /  DBili  x      /  AST  63     /  ALT  45     /  AlkPhos  132    11 Dec 2024 10:30  12 Lead ECG:   Ventricular Rate 81 BPM    Atrial Rate 81 BPM    P-R Interval 148 ms    QRS Duration 104 ms    Q-T Interval 406 ms    QTC Calculation(Bazett) 471 ms    P Axis 44 degrees    R Axis 12 degrees    T Axis 53 degrees    Diagnosis Line Normal sinus rhythm  Normal ECG  When compared with ECG of 29-NOV-2024 07:11,  Nonspecific T wave abnormality, improved in Lateral leads  Confirmed by Kya Gonzalez ([id number]) on 12/1/2024 10:43:18 AM (12-01-24 @ 09:28)    TRANSTHORACIC ECHOCARDIOGRAM REPORT  ________________________________________________________________________________                                      ______  Pt. Name:       JAN CALVIN Study Date:    11/29/2024  MRN:            [id number]          YOB: 1955  Accession #:    [id number]         Age:           69 years  Account#:       [id number]        Gender:        M  Heart Rate:                       Height:        64.17 in (163.00 cm)  Rhythm:       Weight:        169.75 lb (77.00 kg)  Blood Pressure: 196/81 mmHg       BSA/BMI:       1.83 m² / 28.98 kg/m²  ________________________________________________________________________________________  Referring Physician:    [id number] Ale Ibrahim  Interpreting Physician: Kya Gonzalez MD  Primary Sonographer:    Butch Salazar    CPT:               ECHO TTE WO CON COMP W DOPP - 26279.m  Indication(s):     Cardiac murmur, unspecified - R01.1  Procedure:         Transthoracic echocardiogram with 2-D, M-mode and complete                     spectral and color flow Doppler.  Ordering Location: [facility]  Admission Status:  ED  _______________________________________________________________________________________     CONCLUSIONS:      1. Left ventricular cavity is normal in size. Left ventricular systolic function is normal with an ejection fraction of 60 % by Moreno's method of disks.   2. Trace pericardial effusion noted adjacent to the posterior left ventricle.   3. Normal right ventricular cavity size and normal right ventricular systolic function.   4. Aortic valve anatomy cannot be determined with normal systolic excursion. There is calcification of the aortic valve leaflets.   5. Structurally normal mitral valve with normalleaflet excursion.   6. Structurally normal tricuspid valve with normal leaflet excursion. Trace tricuspid regurgitation.   7. The inferior vena cava is normal in size measuring 1.36 cm in diameter, (normal <2.1cm) with normal inspiratory collapse (normal >50%) consistent with normal right atrial pressure (~3, range 0-5mmHg).  _______________________________________________________________________________________  FINDINGS:     Left Ventricle:  The left ventricular cavity is normal in size. Left ventricular systolic function is normal with a calculated ejection fraction of 60 % by the Moreno's biplane method of disks.     Right Ventricle:  The right ventricular cavity is normal in size and right ventricular systolic function is normal. Tricuspid annular plane systolic excursion (TAPSE) is 2.9 cm (normal >=1.7 cm).     Left Atrium:  The left atrium is normal in size with an indexed volume of 33.15 ml/m².     Right Atrium:  The right atrium is normal in size with an indexed volume of 21.17 ml/m².     Interatrial Septum:  The interatrial septum appears intact.     Aortic Valve:  The aortic valve anatomy cannot be determined with normal systolic excursion. There is calcification of the aortic valve leaflets. The peak transaortic velocity is 2.15 m/s, peak transaortic gradient is 18.5 mmHg and mean transaortic gradient is 11.0 mmHg with an LVOT/aortic valve VTI ratio of 0.64. The aortic valve area is estimated at 2.67 cm² by the continuity equation. There is no evidence of aortic regurgitation.     Mitral Valve:  Structurally normal mitral valve with normal leaflet excursion. There is calcification of the mitral valve annulus.     Tricuspid Valve:  Structurally normal tricuspid valve with normal leaflet excursion. There is trace tricuspid regurgitation. Estimated pulmonary artery systolic pressure is 8 mmHg.     Aorta:  The aortic root at the sinuses of Valsalva is normal in size, measuring 3.50 cm (indexed 1.91 cm/m²). The ascending aorta is dilated, measuring 4.10 cm (indexed 2.24 cm/m²).     Pericardium:  There is a trace pericardial effusion noted adjacent to the posterior left ventricle.     Systemic Veins:  The inferior vena cava is normal in size measuring 1.36 cm in diameter, (normal <2.1cm) with normal inspiratory collapse (normal >50%) consistent with normal right atrial pressure (~3, range 0-5mmHg).  ____________________________________________________________________  QUANTITATIVE DATA:  Left Ventricle Measurements: (Indexed to BSA)     IVSd (2D):   1.0 cm  LVPWd (2D):  1.0 cm  LVIDd (2D):  5.3 cm  LVIDs (2D):  3.6 cm  LV Mass:     203 g  111.2 g/m²  LV Vol d, MOD A2C: 97.8 ml  53.50 ml/m²  LV Vol d, MOD A4C: 121.0 ml 66.19 ml/m²  LV Vol d, MOD BP:  109.5 ml 59.90 ml/m²  LV Vol s, MOD A2C: 36.4 ml  19.91 ml/m²  LV Vol s, MOD A4C: 49.5 ml  27.08 ml/m²  LV Vol s, MOD BP:  44.0 ml  24.04 ml/m²  LVOT SV MOD BP:    65.5 ml  LV EF% MOD BP:     60 %     MV E Vmax:    1.02 m/s  MV A Vmax:    0.93 m/s  MV E/A:       1.10  e' lateral:   13.10 cm/s  e' medial:    9.25 cm/s  E/e' lateral: 7.79  E/e' medial:  11.03  E/e' Average: 9.13  MV DT:        151 msec    Aorta Measurements: (Normal range) (Indexed to BSA)     Ao Root d     3.50 cm (3.1 - 3.7 cm) 1.91 cm/m²  Ao Asc d, 2D: 4.10  Ao Asc prox:  4.10 cm               2.24 cm/m²    Left Atrium Measurements: (Indexed to BSA)  LA Diam 2D: 4.40 cm    Right Ventricle Measurements: Right Atrial Measurements:     TAPSE:            2.9 cm      RA Vol:            38.70 ml  RV Base (RVID1):  3.7cm      RA Vol s, MOD A4C  38.7 ml  RV Mid (RVID2):   3.1 cm      RA Vol Index:      21.17 ml/m²  RV Major (RVID3): 8.0 cm      RA Area s, MOD A4C 14.7 cm²  LVOT / RVOT/ Qp/Qs Data: (Indexed to BSA)  LVOT Diameter:  2.30 cm  LVOT Area:      4.15cm²  LVOT Vmax:      1.34 m/s  LVOT Vmn:       0.949 m/s  LVOT VTI:       26.30 cm  LVOT peak grad: 7 mmHg  LVOT mean grad: 4.0 mmHg  LVOT SV:        109.3 ml  59.78 ml/m²    Aortic Valve Measurements:  AV Vmax:                2.2 m/s  AV Peak Gradient:       18.5 mmHg  AV Mean Gradient:       11.0 mmHg  AV VTI:                 41.0 cm  AV VTI Ratio:           0.64  AoV EOA, Contin:        2.67 cm²  AoV EOA, Contin i:      1.46 cm²/m²  AoV Dimensionless Index 0.64    Mitral Valve Measurements:     MV E Vmax: 1.0 m/s  MV A Vmax: 0.9 m/s  MV E/A:    1.1    Tricuspid Valve Measurements:     TR Vmax:          1.2 m/s  TR Peak Gradient: 5.3 mmHg  RA Pressure:      3 mmHg  PASP:             8 mmHg    ________________________________________________________________________________________  Electronically signed on 11/30/2024 at 1:57:15 PM by Kay Gonzalez MD         *** Final ***

## 2024-12-12 NOTE — PROGRESS NOTE ADULT - ASSESSMENT
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

## 2024-12-12 NOTE — PROGRESS NOTE ADULT - SUBJECTIVE AND OBJECTIVE BOX
[facility]    Remi Sampson NP    [address] [place] [address]  [phone]      Chief Complaint:  Patient is a 69y old  Male who presents with a chief complaint of AMS (12 Dec 2024 08:27)      HPI/ 24 hr events:   Patient seen and examined at bedside  No acute GI complaints  NPO for IR guided biopsy today       REVIEW OF SYSTEMS:   General: Negative  HEENT: Negative  CV: Negative  Respiratory: Negative  GI: See HPI  : Negative  MSK: Negative  Hematologic: Negative  Skin: Negative    MEDICATIONS:   MEDICATIONS  (STANDING):  amLODIPine   Tablet 10 milliGRAM(s) Oral daily  ascorbic acid 500 milliGRAM(s) Oral two times a day  azaTHIOprine 50 milliGRAM(s) Oral two times a day  buPROPion XL (24-Hour) . 300 milliGRAM(s) Oral daily  carvedilol 12.5 milliGRAM(s) Oral every 12 hours  cloNIDine 0.1 milliGRAM(s) Oral three times a day  dextrose 5%. 1000 milliLiter(s) (50 mL/Hr) IV Continuous <Continuous>  dextrose 5%. 1000 milliLiter(s) (100 mL/Hr) IV Continuous <Continuous>  dextrose 50% Injectable 25 Gram(s) IV Push once  dextrose 50% Injectable 12.5 Gram(s) IV Push once  dextrose 50% Injectable 25 Gram(s) IV Push once  escitalopram 10 milliGRAM(s) Oral <User Schedule>  ferrous    sulfate 325 milliGRAM(s) Oral two times a day  glucagon  Injectable 1 milliGRAM(s) IntraMuscular once  hydrALAZINE 100 milliGRAM(s) Oral three times a day  insulin glargine Injectable (LANTUS) 24 Unit(s) SubCutaneous at bedtime  insulin lispro (ADMELOG) corrective regimen sliding scale   SubCutaneous every 6 hours  levothyroxine 88 MICROGram(s) Oral <User Schedule>  lisinopril 40 milliGRAM(s) Oral daily  mineral oil enema 133 milliLiter(s) Rectal once  multivitamin/minerals/iron Oral Solution (CENTRUM) 15 milliLiter(s) Oral daily  pantoprazole    Tablet 40 milliGRAM(s) Oral two times a day  polyethylene glycol 3350 17 Gram(s) Oral daily  predniSONE   Tablet 20 milliGRAM(s) Oral daily  pyridoxine 50 milliGRAM(s) Oral daily  rosuvastatin 10 milliGRAM(s) Oral at bedtime  senna 2 Tablet(s) Oral at bedtime  sodium chloride 0.9%. 1000 milliLiter(s) (50 mL/Hr) IV Continuous <Continuous>  tacrolimus 2 milliGRAM(s) Oral daily  tacrolimus 1 milliGRAM(s) Oral at bedtime    MEDICATIONS  (PRN):  aluminum hydroxide/magnesium hydroxide/simethicone Suspension 30 milliLiter(s) Oral every 4 hours PRN Dyspepsia  dextrose Oral Gel 15 Gram(s) Oral once PRN Blood Glucose LESS THAN 70 milliGRAM(s)/deciliter  hydrALAZINE Injectable 10 milliGRAM(s) IV Push every 8 hours PRN SBP >180 and HR 60-70bpm  melatonin 3 milliGRAM(s) Oral at bedtime PRN Insomnia  metoprolol tartrate Injectable 5 milliGRAM(s) IV Push every 6 hours PRN SBP >170  ondansetron Injectable 4 milliGRAM(s) IV Push every 8 hours PRN Nausea and/or Vomiting      ALLERGIES:   Allergies    No Known Allergies    Intolerances        VITAL SIGNS:   Vital Signs Last 24 Hrs  T(C): 36.3 (12 Dec 2024 04:55), Max: 37 (11 Dec 2024 20:52)  T(F): 97.4 (12 Dec 2024 04:55), Max: 98.6 (11 Dec 2024 20:52)  HR: 64 (12 Dec 2024 09:16) (62 - 68)  BP: 147/60 (12 Dec 2024 09:16) (122/69 - 182/67)  BP(mean): --  RR: 18 (12 Dec 2024 09:16) (17 - 19)  SpO2: 97% (12 Dec 2024 09:16) (94% - 97%)    Parameters below as of 12 Dec 2024 09:16  Patient On (Oxygen Delivery Method): room air      I&O's Summary    11 Dec 2024 07:01  -  12 Dec 2024 07:00  --------------------------------------------------------  IN: 0 mL / OUT: 2000 mL / NET: -2000 mL        PHYSICAL EXAM:   GENERAL:  No acute distress  HEENT:  NC/AT  CHEST:  No increased effort  HEART:  Regular rate  ABDOMEN:  Soft, non-tender, non-distended  EXTREMITIES: No edema, no cyanosis  SKIN:  Warm, dry  NEURO:  Calm, cooperative    LABS:                        8.8    3.77  )-----------( 453      ( 12 Dec 2024 06:25 )             26.9     12-12    133[L]  |  102  |  40[H]  ----------------------------<  333[H]  5.6[H]   |  26  |  0.91    Ca    11.2[H]      12 Dec 2024 06:25    TPro  7.3  /  Alb  2.1[L]  /  TBili  0.7  /  DBili  x   /  AST  51[H]  /  ALT  47  /  AlkPhos  136[H]  12-12    LIVER FUNCTIONS - ( 12 Dec 2024 06:25 )  Alb: 2.1 g/dL / Pro: 7.3 g/dL / ALK PHOS: 136 U/L / ALT: 47 U/L / AST: 51 U/L / GGT: x                                             RADIOLOGY & ADDITIONAL STUDIES:

## 2024-12-12 NOTE — CHART NOTE - NSCHARTNOTEFT_GEN_A_CORE
Patient scheduled for Liver biopsy with IR today.  Case reviewed with Dr. Barger and anesthesia Dr. López.  Labs this morning showed hyperkalemia and patient was given PO Lokelma to resolve.  Unfortunately the patient took a PO medication and can no longer undergo anesthesia.  Patient has been rescheduled for Monday 12/16/24.

## 2024-12-12 NOTE — PROGRESS NOTE ADULT - SUBJECTIVE AND OBJECTIVE BOX
[facility], Division of Infectious Diseases  WANG Mccoy Y. Patel, S. Shah, G. [facility]  [phone]    Name: JAN CALVIN  Age: 69y  Gender: Male  MRN: [id number]    Interval History:  No acute overnight events.   Notes reviewed    Antibiotics:      Medications:  aluminum hydroxide/magnesium hydroxide/simethicone Suspension 30 milliLiter(s) Oral every 4 hours PRN  amLODIPine   Tablet 10 milliGRAM(s) Oral daily  ascorbic acid 500 milliGRAM(s) Oral two times a day  azaTHIOprine 50 milliGRAM(s) Oral two times a day  buPROPion XL (24-Hour) . 300 milliGRAM(s) Oral daily  carvedilol 12.5 milliGRAM(s) Oral every 12 hours  cloNIDine 0.1 milliGRAM(s) Oral three times a day  dextrose 5%. 1000 milliLiter(s) IV Continuous <Continuous>  dextrose 5%. 1000 milliLiter(s) IV Continuous <Continuous>  dextrose 50% Injectable 25 Gram(s) IV Push once  dextrose 50% Injectable 12.5 Gram(s) IV Push once  dextrose 50% Injectable 25 Gram(s) IV Push once  dextrose Oral Gel 15 Gram(s) Oral once PRN  escitalopram 10 milliGRAM(s) Oral <User Schedule>  ferrous    sulfate 325 milliGRAM(s) Oral two times a day  glucagon  Injectable 1 milliGRAM(s) IntraMuscular once  hydrALAZINE 100 milliGRAM(s) Oral three times a day  hydrALAZINE Injectable 10 milliGRAM(s) IV Push every 8 hours PRN  insulin glargine Injectable (LANTUS) 24 Unit(s) SubCutaneous at bedtime  insulin lispro (ADMELOG) corrective regimen sliding scale   SubCutaneous every 6 hours  levothyroxine 88 MICROGram(s) Oral <User Schedule>  lisinopril 40 milliGRAM(s) Oral daily  melatonin 3 milliGRAM(s) Oral at bedtime PRN  metoprolol tartrate Injectable 5 milliGRAM(s) IV Push every 6 hours PRN  mineral oil enema 133 milliLiter(s) Rectal once  multivitamin/minerals/iron Oral Solution (CENTRUM) 15 milliLiter(s) Oral daily  ondansetron Injectable 4 milliGRAM(s) IV Push every 8 hours PRN  pantoprazole    Tablet 40 milliGRAM(s) Oral two times a day  polyethylene glycol 3350 17 Gram(s) Oral daily  predniSONE   Tablet 20 milliGRAM(s) Oral daily  pyridoxine 50 milliGRAM(s) Oral daily  rosuvastatin 10 milliGRAM(s) Oral at bedtime  senna 2 Tablet(s) Oral at bedtime  sodium chloride 0.9%. 1000 milliLiter(s) IV Continuous <Continuous>  sodium zirconium cyclosilicate 5 Gram(s) Oral <User Schedule>  tacrolimus 2 milliGRAM(s) Oral daily  tacrolimus 1 milliGRAM(s) Oral at bedtime      Review of Systems:  unable to obtain    Allergies: No Known Allergies    For details regarding the patient's past medical history, social history, family history, and other miscellaneous elements, please refer the initial infectious diseases consultation and/or the admitting history and physical examination for this admission.    Objective:  Vitals:   T(C): 36.9 (12-12-24 @ 13:53), Max: 37 (12-11-24 @ 20:52)  HR: 60 (12-12-24 @ 13:53) (60 - 68)  BP: 127/66 (12-12-24 @ 13:53) (104/61 - 182/67)  RR: 18 (12-12-24 @ 13:53) (17 - 18)  SpO2: 98% (12-12-24 @ 13:53) (94% - 98%)    Physical Examination:  General: no acute distress  HEENT: NC/AT, EOMI  Cardio: S1, S2 heard, RRR, no murmurs  Resp: decreased breath sounds  Abd: soft, NT, ND,  Ext: no edema or cyanosis  Skin: warm, dry, no visible rash      Laboratory Studies:  CBC:                       8.8    3.77  )-----------( 453      ( 12 Dec 2024 06:25 )             26.9     CMP: 12-12    x   |  x   |  x   ----------------------------<  x   5.0   |  x   |  x     Ca    10.7[H]      12 Dec 2024 10:55    TPro  7.3  /  Alb  2.1[L]  /  TBili  0.7  /  DBili  x   /  AST  51[H]  /  ALT  47  /  AlkPhos  136[H]  12-12    LIVER FUNCTIONS - ( 12 Dec 2024 06:25 )  Alb: 2.1 g/dL / Pro: 7.3 g/dL / ALK PHOS: 136 U/L / ALT: 47 U/L / AST: 51 U/L / GGT: x           Urinalysis Basic - ( 12 Dec 2024 10:55 )    Color: x / Appearance: x / SG: x / pH: x  Gluc: 290 mg/dL / Ketone: x  / Bili: x / Urobili: x   Blood: x / Protein: x / Nitrite: x   Leuk Esterase: x / RBC: x / WBC x   Sq Epi: x / Non Sq Epi: x / Bacteria: x        Microbiology: reviewed    Culture - Blood (collected 11-30-24 @ 07:05)  Source: .Blood BLOOD  Final Report (12-05-24 @ 13:00):    No growth at 5 days    Culture - Blood (collected 11-30-24 @ 07:00)  Source: .Blood BLOOD  Final Report (12-05-24 @ 13:00):    No growth at 5 days    Culture - Urine (collected 11-29-24 @ 11:00)  Source: Catheterized  Final Report (12-01-24 @ 10:17):    50,000 - 99,000 CFU/mL Escherichia coli ESBL  Organism: Escherichia coli ESBL (12-01-24 @ 10:17)  Organism: Escherichia coli ESBL (12-01-24 @ 10:17)      Method Type: CHRIST      -  Ampicillin: R >16 These ampicillin results predict results for amoxicillin      -  Ampicillin/Sulbactam: I 16/8      -  Aztreonam: R >16      -  Cefazolin: R >16 For uncomplicated UTI with K. pneumoniae, E. coli, or P. mirablis: CHRIST <=16 is sensitive and CHRIST >=32 is resistant. This also predicts results for oral agents cefaclor, cefdinir, cefpodoxime, cefprozil, cefuroxime axetil, cephalexin and locarbef for uncomplicated UTI. Note that some isolates may be susceptible to these agents while testing resistant to cefazolin.      -  Cefepime: R >16      -  Ceftriaxone: R >32      -  Cefuroxime: R >16      -  Ciprofloxacin: R >2      -  Ertapenem: S <=0.5      -  Gentamicin: R >8      -  Imipenem: S <=1      -  Levofloxacin: R 4      -  Meropenem: S <=1      -  Nitrofurantoin: S <=32 Should not be used to treat pyelonephritis      -  Piperacillin/Tazobactam: S <=8      -  Tobramycin: R >8      -  Trimethoprim/Sulfamethoxazole: R >2/38    Urinalysis with Rflx Culture (collected 11-29-24 @ 11:00)    Culture - Blood (collected 11-29-24 @ 07:15)  Source: .Blood BLOOD  Gram Stain (11-29-24 @ 21:31):    Growth in aerobic bottle: Gram Negative Rods    Growth in anaerobic bottle: Gram Negative Rods  Final Report (12-01-24 @ 17:40):    Growth in aerobic and anaerobic bottles: Escherichia coli ESBL  Organism: Escherichia coli ESBL (12-01-24 @ 17:40)  Organism: Escherichia coli ESBL (12-01-24 @ 17:40)      Method Type: CHRIST      -  Ampicillin: R >16 These ampicillin results predict results for amoxicillin      -  Ampicillin/Sulbactam: R >16/8      -  Aztreonam: R >16      -  Cefazolin: R >16      -  Cefepime: R >16      -  Ceftriaxone: R >32      -  Ciprofloxacin: R >2      -  Ertapenem: S <=0.5      -  Gentamicin: R >8      -  Imipenem: S <=1      -  Levofloxacin: R 4      -  Meropenem: S <=1      -  Piperacillin/Tazobactam: R <=8      -  Tobramycin: R >8      -  Trimethoprim/Sulfamethoxazole: R >2/38    Culture - Blood (collected 11-29-24 @ 07:10)  Source: .Blood BLOOD  Gram Stain (11-29-24 @ 22:18):    Growth in anaerobic bottle: Gram Negative Rods    Growth in aerobic bottle: Gram Negative Rods  Final Report (12-01-24 @ 17:41):    Growth in aerobic and anaerobic bottles: Escherichia coli ESBL    Direct identification is available within approximately 3-5    hours either by Blood Panel Multiplexed PCR or Direct    MALDI-TOF. Details: [email or web address]  Organism: Blood Culture PCR (12-01-24 @ 17:41)  Organism: Blood Culture PCR (12-01-24 @ 17:41)      Method Type: PCR      -  Escherichia coli: Detec      -  ESBL: Detec      -  CTX-M Resistance Marker: Detec          Radiology: reviewed

## 2024-12-12 NOTE — PROGRESS NOTE ADULT - ASSESSMENT
CKD 3, h/o Kidney transplant ~2012, h/o Left Nephrectomy  Diabetes  Hypertension  Sepsis, UTI, Sacral osteomyelitis, Gram negative bacteremia  Hypokalemia  Hypercalcemia, Elevated 1,25 Vitamin D (ACEI level: WNL), R/o Lymphoma  Hypomagnesemia  Hypophosphatemia  Anemia  + Liver mass    12/09/24: Stable renal indices. To continue current immuno suppressants. For MRI. Calcium levels slowly trending down. .  Monitor blood sugar levels. Insulin coverage as needed. Dietary restriction. Will lower lisinopril dose if BP remains low. Trend BP and titrate BP meds as needed.   Trend BP and titrate BP meds as needed. Hematology follow up. Will follow electrolytes and renal function trend.   12/10/24: Stable renal indices. Calcium levels now worsening again. Will dose steroids once blood sugar better controlled. Pt received aredia 12/06/24. MRI results pending.   12/11/24: Stable renal indices. MRI results noted. Calcium trending down. For ? liver biopsy. Add steroids for hypercalcemia.   12/12/24: For liver biopsy but cancelled due to hyperkalemia. D/w IR earlier and as per discussion with anesthesia procedure will be cancelled for K 5.6. Lokelma ordered.   Low K diet. Continue steroids for hypercalcemia. Hematology follow up.

## 2024-12-12 NOTE — PROGRESS NOTE ADULT - SUBJECTIVE AND OBJECTIVE BOX
[facility]                                                       Dr. Bruce, Dr. Prabhakar, Dr. Cabrales, Dr. Zaragoza, Dr. Bingham, Dr. Loya                                      [facility], [facility], [address]                                                 [address] [address]                                      Ph: [phone]  Fax: [phone]                                         Ph: [phone]  Fax: [phone]      Patient is a 69y old  Male who presents with a chief complaint of AMS (01 Dec 2024 11:19)  Patient seen in follow up for CKD, h/o Kidney transplant.        PAST MEDICAL HISTORY:  Diabetes Mellitus Type II    ESRD on Dialysis    GERD (gastroesophageal reflux disease)    Depression    Hypothyroidism    Hyperlipidemia    Kidney transplanted    Hypertension    ANGELIKA (obstructive sleep apnea)    Peripheral neuropathy    Shoulder fracture        MEDICATIONS  (STANDING):  amLODIPine   Tablet 10 milliGRAM(s) Oral daily  ascorbic acid 500 milliGRAM(s) Oral two times a day  azaTHIOprine 50 milliGRAM(s) Oral two times a day  buPROPion XL (24-Hour) . 300 milliGRAM(s) Oral daily  carvedilol 12.5 milliGRAM(s) Oral every 12 hours  cloNIDine 0.1 milliGRAM(s) Oral three times a day  dextrose 5%. 1000 milliLiter(s) (50 mL/Hr) IV Continuous <Continuous>  dextrose 5%. 1000 milliLiter(s) (100 mL/Hr) IV Continuous <Continuous>  dextrose 50% Injectable 25 Gram(s) IV Push once  dextrose 50% Injectable 12.5 Gram(s) IV Push once  dextrose 50% Injectable 25 Gram(s) IV Push once  escitalopram 10 milliGRAM(s) Oral <User Schedule>  ferrous    sulfate 325 milliGRAM(s) Oral two times a day  glucagon  Injectable 1 milliGRAM(s) IntraMuscular once  hydrALAZINE 100 milliGRAM(s) Oral three times a day  insulin glargine Injectable (LANTUS) 24 Unit(s) SubCutaneous at bedtime  insulin lispro (ADMELOG) corrective regimen sliding scale   SubCutaneous every 6 hours  levothyroxine 88 MICROGram(s) Oral <User Schedule>  lisinopril 40 milliGRAM(s) Oral daily  mineral oil enema 133 milliLiter(s) Rectal once  multivitamin/minerals/iron Oral Solution (CENTRUM) 15 milliLiter(s) Oral daily  pantoprazole    Tablet 40 milliGRAM(s) Oral two times a day  polyethylene glycol 3350 17 Gram(s) Oral daily  predniSONE   Tablet 20 milliGRAM(s) Oral daily  pyridoxine 50 milliGRAM(s) Oral daily  rosuvastatin 10 milliGRAM(s) Oral at bedtime  senna 2 Tablet(s) Oral at bedtime  sodium chloride 0.9%. 1000 milliLiter(s) (50 mL/Hr) IV Continuous <Continuous>  tacrolimus 2 milliGRAM(s) Oral daily  tacrolimus 1 milliGRAM(s) Oral at bedtime    MEDICATIONS  (PRN):  aluminum hydroxide/magnesium hydroxide/simethicone Suspension 30 milliLiter(s) Oral every 4 hours PRN Dyspepsia  dextrose Oral Gel 15 Gram(s) Oral once PRN Blood Glucose LESS THAN 70 milliGRAM(s)/deciliter  hydrALAZINE Injectable 10 milliGRAM(s) IV Push every 8 hours PRN SBP >180 and HR 60-70bpm  melatonin 3 milliGRAM(s) Oral at bedtime PRN Insomnia  metoprolol tartrate Injectable 5 milliGRAM(s) IV Push every 6 hours PRN SBP >170  ondansetron Injectable 4 milliGRAM(s) IV Push every 8 hours PRN Nausea and/or Vomiting    T(C): 36.9 (12-12-24 @ 12:21), Max: 37.1 (12-10-24 @ 15:00)  HR: 63 (12-12-24 @ 12:21) (62 - 68)  BP: 104/61 (12-12-24 @ 12:21) (104/61 - 182/67)  RR: 18 (12-12-24 @ 12:21)  SpO2: 96% (12-12-24 @ 12:21)  Wt(kg): --  I&O's Detail    11 Dec 2024 07:01  -  12 Dec 2024 07:00  --------------------------------------------------------  IN:  Total IN: 0 mL    OUT:    Voided (mL): 2000 mL  Total OUT: 2000 mL    Total NET: -2000 mL                          PHYSICAL EXAM:  General: No distress  Respiratory: b/l air entry  Cardiovascular: S1 S2  Gastrointestinal: soft  Extremities:  no edema        LABORATORY:                        8.8    3.77  )-----------( 453      ( 12 Dec 2024 06:25 )             26.9     12-12    x   |  x   |  x   ----------------------------<  x   5.0   |  x   |  x     Ca    10.7[H]      12 Dec 2024 10:55    TPro  7.3  /  Alb  2.1[L]  /  TBili  0.7  /  DBili  x   /  AST  51[H]  /  ALT  47  /  AlkPhos  136[H]  12-12    Sodium: 134 mmol/L (12-12 @ 10:55)  Sodium: 133 mmol/L (12-12 @ 06:25)  Sodium: 137 mmol/L (12-11 @ 10:30)    Potassium: 5.0 mmol/L (12-12 @ 13:00)  Potassium: 5.2 mmol/L (12-12 @ 10:55)  Potassium: 5.6 mmol/L (12-12 @ 06:25)  Potassium: 4.8 mmol/L (12-11 @ 10:30)    Hemoglobin: 8.8 g/dL (12-12 @ 06:25)  Hemoglobin: 8.4 g/dL (12-11 @ 10:30)  Hemoglobin: 6.5 g/dL (12-10 @ 07:30)    Creatinine, Serum 0.82 (12-12 @ 10:55)  Creatinine, Serum 0.91 (12-12 @ 06:25)  Creatinine, Serum 0.84 (12-11 @ 10:30)  Creatinine, Serum 0.97 (12-10 @ 07:30)        LIVER FUNCTIONS - ( 12 Dec 2024 06:25 )  Alb: 2.1 g/dL / Pro: 7.3 g/dL / ALK PHOS: 136 U/L / ALT: 47 U/L / AST: 51 U/L / GGT: x           Urinalysis Basic - ( 12 Dec 2024 10:55 )    Color: x / Appearance: x / SG: x / pH: x  Gluc: 290 mg/dL / Ketone: x  / Bili: x / Urobili: x   Blood: x / Protein: x / Nitrite: x   Leuk Esterase: x / RBC: x / WBC x   Sq Epi: x / Non Sq Epi: x / Bacteria: x

## 2024-12-12 NOTE — PROGRESS NOTE ADULT - SUBJECTIVE AND OBJECTIVE BOX
Interval History:    Chart reviewed and events noted;   Overnight events:    MEDICATIONS  (STANDING):  amLODIPine   Tablet 10 milliGRAM(s) Oral daily  ascorbic acid 500 milliGRAM(s) Oral two times a day  azaTHIOprine 50 milliGRAM(s) Oral two times a day  buPROPion XL (24-Hour) . 300 milliGRAM(s) Oral daily  carvedilol 12.5 milliGRAM(s) Oral every 12 hours  cloNIDine 0.1 milliGRAM(s) Oral three times a day  dextrose 5%. 1000 milliLiter(s) (100 mL/Hr) IV Continuous <Continuous>  dextrose 5%. 1000 milliLiter(s) (50 mL/Hr) IV Continuous <Continuous>  dextrose 50% Injectable 25 Gram(s) IV Push once  dextrose 50% Injectable 12.5 Gram(s) IV Push once  dextrose 50% Injectable 25 Gram(s) IV Push once  escitalopram 10 milliGRAM(s) Oral <User Schedule>  ferrous    sulfate 325 milliGRAM(s) Oral two times a day  glucagon  Injectable 1 milliGRAM(s) IntraMuscular once  hydrALAZINE 100 milliGRAM(s) Oral three times a day  insulin glargine Injectable (LANTUS) 24 Unit(s) SubCutaneous at bedtime  insulin lispro (ADMELOG) corrective regimen sliding scale   SubCutaneous three times a day before meals  insulin lispro Injectable (ADMELOG) 5 Unit(s) SubCutaneous three times a day before meals  levothyroxine 88 MICROGram(s) Oral <User Schedule>  lisinopril 40 milliGRAM(s) Oral daily  mineral oil enema 133 milliLiter(s) Rectal once  multivitamin/minerals/iron Oral Solution (CENTRUM) 15 milliLiter(s) Oral daily  pantoprazole    Tablet 40 milliGRAM(s) Oral two times a day  polyethylene glycol 3350 17 Gram(s) Oral daily  predniSONE   Tablet 20 milliGRAM(s) Oral daily  pyridoxine 50 milliGRAM(s) Oral daily  rosuvastatin 10 milliGRAM(s) Oral at bedtime  senna 2 Tablet(s) Oral at bedtime  sodium chloride 0.9%. 1000 milliLiter(s) (50 mL/Hr) IV Continuous <Continuous>  sodium zirconium cyclosilicate 5 Gram(s) Oral <User Schedule>  tacrolimus 2 milliGRAM(s) Oral daily  tacrolimus 1 milliGRAM(s) Oral at bedtime    MEDICATIONS  (PRN):  aluminum hydroxide/magnesium hydroxide/simethicone Suspension 30 milliLiter(s) Oral every 4 hours PRN Dyspepsia  dextrose Oral Gel 15 Gram(s) Oral once PRN Blood Glucose LESS THAN 70 milliGRAM(s)/deciliter  hydrALAZINE Injectable 10 milliGRAM(s) IV Push every 8 hours PRN SBP >180 and HR 60-70bpm  melatonin 3 milliGRAM(s) Oral at bedtime PRN Insomnia  metoprolol tartrate Injectable 5 milliGRAM(s) IV Push every 6 hours PRN SBP >170  ondansetron Injectable 4 milliGRAM(s) IV Push every 8 hours PRN Nausea and/or Vomiting      Vital Signs Last 24 Hrs  T(C): 36.9 (12 Dec 2024 13:53), Max: 37 (11 Dec 2024 20:52)  T(F): 98.5 (12 Dec 2024 13:53), Max: 98.6 (11 Dec 2024 20:52)  HR: 60 (12 Dec 2024 13:53) (60 - 68)  BP: 127/66 (12 Dec 2024 13:53) (104/61 - 182/67)  BP(mean): --  RR: 18 (12 Dec 2024 13:53) (18 - 18)  SpO2: 98% (12 Dec 2024 13:53) (94% - 98%)    Parameters below as of 12 Dec 2024 13:53  Patient On (Oxygen Delivery Method): room air        PHYSICAL EXAM  General: adult in NAD  HEENT: clear oropharynx, anicteric sclera, pink conjunctivae  Neck: supple  CV: normal S1S2 with no murmur rubs or gallops  Lungs: clear to auscultation, no wheezes, no rhales  Abdomen: soft non-tender non-distended, no hepato/splenomegaly  Ext: no clubbing cyanosis or edema  Skin: no rashes and no petichiae  Neuro: alert and oriented X3 no focal deficits      LABS:  CBC Full  -  ( 12 Dec 2024 06:25 )  WBC Count : 3.77 K/uL  RBC Count : 2.98 M/uL  Hemoglobin : 8.8 g/dL  Hematocrit : 26.9 %  Platelet Count - Automated : 453 K/uL  Mean Cell Volume : 90.3 fl  Mean Cell Hemoglobin : 29.5 pg  Mean Cell Hemoglobin Concentration : 32.7 g/dL  Auto Neutrophil # : 3.21 K/uL  Auto Lymphocyte # : 0.21 K/uL  Auto Monocyte # : 0.29 K/uL  Auto Eosinophil # : 0.00 K/uL  Auto Basophil # : 0.01 K/uL  Auto Neutrophil % : 85.1 %  Auto Lymphocyte % : 5.6 %  Auto Monocyte % : 7.7 %  Auto Eosinophil % : 0.0 %  Auto Basophil % : 0.3 %    12-12    x   |  x   |  x   ----------------------------<  x   5.0   |  x   |  x     Ca    10.7[H]      12 Dec 2024 10:55    TPro  7.3  /  Alb  2.1[L]  /  TBili  0.7  /  DBili  x   /  AST  51[H]  /  ALT  47  /  AlkPhos  136[H]  12-12        fe studies      WBC trend  3.77 K/uL (12-12-24 @ 06:25)  4.51 K/uL (12-11-24 @ 10:30)  5.38 K/uL (12-10-24 @ 07:30)      Hgb trend  8.8 g/dL (12-12-24 @ 06:25)  8.4 g/dL (12-11-24 @ 10:30)  6.5 g/dL (12-10-24 @ 07:30)      plt trend  453 K/uL (12-12-24 @ 06:25)  438 K/uL (12-11-24 @ 10:30)  336 K/uL (12-10-24 @ 07:30)        RADIOLOGY & ADDITIONAL STUDIES:

## 2024-12-12 NOTE — PROGRESS NOTE ADULT - ASSESSMENT
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  - CBC noted, transfuse PRN   - PPI for ppx  - Monitor for any overt GI bleeding  - Follow up repeat MRI with IV contrast  - Outside path noted  - ?component of AIH      I reviewed the overnight course of events on the unit, re-confirming the patient history. I discussed the care with the patient.  Differential diagnosis and plan of care discussed with patient after the evaluation.  35 minutes spent on total encounter of which more than fifty percent of the encounter was spent counseling and/or coordinating care by the attending physician.

## 2024-12-12 NOTE — PROGRESS NOTE ADULT - ASSESSMENT
Patient is a 70yo M, PMH DM, HTN, HLD, h/o kidney transplant, hypothyroidism, presents to ED from [facility] for AMS likely  acute  metabolic encephalopathy       AMS 2/2 Sepsis 2/2 multiple infections (UTI, sacral infection, possible osteomyelitis), E coli bacteremia   acute  metabolic encephalopathy   Sepsis 2/2 ESBL Ecoli UTI and bacteremia. sensitive to ertapenem.  -C/W Meropenum 1gm q24h x10 day course until 12/10 d/w  ID Dr. Saul with  midline   -Tylenol PRN pain and fever- diet as tolerated   -aspiration and fall risk - Continue Senna, Miralax, PRN dulcolax suppositories   -MR pelvis/sacrum to eval for osteomyelitis-  Again seen are comminuted bilateral sacral lona fractures with marked osseous edema and marked loss of normal T1 fatty marrow. Osseous edema with loss of normal T1 fatty marrow at the caudal aspect of the left posterior iliac bone adjacent to the sacroiliac joint. Previously seen fracture component is better visualized on CT. These findings could be secondary to extensive fracture deformities in an osteopenic patient versus osteomyelitis if there was a history of a soft tissue ulcer extending to bone versus metastatic disease given the marked loss of T1 fatty marrow if there is a history of malignancy. Clinical correlation with patient history is advised.  - Per Ortho no surgical intervention for fracture of sacrum  / needs dolores     Acute on Chronic anemia   - Likely due to infection,  and CKD- No signs of acute  bleeding noted   - Iron22/ sat low    iron deficiency with chr anemia  +  - S/p  1unit of pRBC today,  given hx of renal transplant will use irradiated PRBC   - Hem Dr. Sal following     Hepatic lesion  - CT abd with 5.5cm lesion on R hepatic lobe also noticed  retroperitoneal  fibrosis  and spleen mass   - histopath not fully reported by  [facility] in sept /2024 paper work   - hem/onc  DR SAL / and nephro dr vizcarra   would like to  repeat   liver biopsy by ir  after mri abd  - MRI abdomen reviewed, noted for hepatic lesion.   - D/w Onc, plan for IR guided biopsy today. Patient is NPO except meds.     Diabetes Mellitus / hyperglycemia  - A1c 7.4  - Hold Pre meal Insulin since patient NPO,  Lantus to 24 u QHS  - ISS  - carb controlled diet    HTN  - Continue Lisinopril 40 mg  daily   - Continue Hydralazine 100mg TID with hold parameters  - Continue Coreg, switched from Metoprolol - 12.5 mg po bid  - Continue Amlodipine 10mg daily -  - added on clonidine 0.1  mg tid   - fu bp closely     HLD  Continue Crestor 10mg daily    s/p Kidney transplant/CKD 3  - Continue Tacrolimus 2mg daily  - Continue Tacrolimus 1mg QHS  - Continue Azathioprine 50mg BID  - Nephrology consulted dr vizcarra     Hypothyroidism  - Continue Levothyroxine 88mcg QAM    Hypercalcemia  - possible  sec to  underlying  lymphoproliferative disorder  - sec to vit D  s/p  calcitonin   s/p   Aredia   - Calcitonin q 12 hrs X 3 doses  - Nephro following    Depression  Continue Escitalopram 10mg TID  Continue Bupropion 300mg daily        DVT ppx: Hold AC due to anemia and risk of bleeding         wife updated. Please Update wife, Ludmila Gann [phone] daily  Dispo: JOCELYNN bernal

## 2024-12-12 NOTE — PROGRESS NOTE ADULT - ASSESSMENT
70yo M, PMH DM, HTN, HLD, h/o kidney transplant, hypothyroidism, presents to ED from [facility] for AMS, found to be febrile with positive UA. 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 Cardiology called for pericardial effusion.     Uncontrolled HTN/Pericardial Effusion  - CT chest: Small left pleural effusion with small loculated components and small to moderate pericardial effusion  - TTE: EF 60%, trace pericardial effusion adjacent ot the posterior LV.  No significant valvular disease  - No tamponade physiology  - No evidence of any meaningful volume overload   - Remains clinically dry on exam.  Encourage fluid/po intake, or IVF    - EKG with nonspecific TWI anteriorly, repeat unchanged  - No evidence of any active ischemia   - Continue statin   - Continue to hold ASA in setting of persistent anemia, though, neg FOBT.  s/p PRBC's for Hgb of 6.5 (12/10)  - Continue to trend and transfuse per primary   - Heme and GI following for anemia.  With liver mass on MRI, planned for liver Bx via IR    - BP labile at 120-180, though, the latter can be an outlier.  Will not adjust meds at this time  - Continue Norvasc 10mg and Lisinopril 40 mg daily  - Continue Clonidine 0.1 mg PO TID   - Continue coreg 12.5mg BID   - Continue Hydralazine but would reduce to 50 mg q8H if SBP stable   - Continue to monitor routine hemodynamics     - Ortho following for pathological sacral Fx with possible OM.   - No acute orthopedic surgical intervention at this time.    - Monitor and replete Lytes. Keep K > 4 and Mg > 2  - Will continue to follow.    Oralia Mendoza DNP, NP-C, AGACNP-C  Cardiology   Call TEAMS

## 2024-12-12 NOTE — PROGRESS NOTE ADULT - ASSESSMENT
Patient is a 68yo M, PMH DM, HTN, HLD, h/o kidney transplant, hypothyroidism, presents to ED from [facility] for AMS. Patient is lethargic and unable to provide history at this time.    ESBL Bacteremia 2/2 Acute Cystitis  Sacral Wound/OM  Liver Mass w/ mets  Fevers  AMS 2/2 above  Febrile in the .5  UA positive for UTI  Flu/COVID/RSV negative  11/29 BCx ESBL E.coli , repeat negative  11/29 UCx   50,000 - 99,000 CFU/mL Escherichia coli    CT CAP w/ Small left pleural effusion with small loculated components  Right pelvic transplant kidney with mild fullness of the pelvic alyceal system. Stercoral proctitis.  Patchy sclerosis and osteolysis throughout the bilateral sacrum with pathologic fractures. There is soft tissue with stranding extending throughout the presacral and posterior extraperitoneal pelvic soft tissues.  There are a few bubbles of air/gas at the right sacral pathologic fracture, findings suggestive of infection/osteomyelitis.    MRI Again seen are comminuted bilateral sacral lona fractures with marked osseous edema and marked loss of normal T1 fatty marrow. Osseous edema with loss of normal T1 fatty marrow at thecaudal aspect of the left   posterior iliac bone adjacent to the sacroiliac joint. Previously seen fracture component is better visualized on CT. These findings could be secondary to extensive fracture deformities in an osteopenic patient   versus osteomyelitis if there was a history of a soft tissue ulcer extending to bone versus metastatic disease given the marked loss of T1 fatty marrow if there is a history of malignancy. Clinical correlation   with patient history is advised.    No sacral wound present. Suspect findings on sacral more related to mets from possible malignancy    Recommendations:   S/p ertapenem x10 day course  Monitor off ABx  Trend temps/WBC  Monitor Hgb, transfusion prn protocol  Surgery following  Per Ortho no surgical intervention for fracture of sacrum    liver bx today  Additional care per primary team    Please call with any further questions.  Vanessa Saul M.D.  [facility] [place] Division of Infectious Diseases [phone]  For after 5 P.M. and weekends, please call [phone]  Available on [employer] TEAMS

## 2024-12-13 DIAGNOSIS — E11.65 TYPE 2 DIABETES MELLITUS WITH HYPERGLYCEMIA: ICD-10-CM

## 2024-12-13 LAB
ALBUMIN SERPL ELPH-MCNC: 2.1 G/DL — LOW (ref 3.3–5)
ALP SERPL-CCNC: 116 U/L — SIGNIFICANT CHANGE UP (ref 40–120)
ALT FLD-CCNC: 43 U/L — SIGNIFICANT CHANGE UP (ref 12–78)
ANION GAP SERPL CALC-SCNC: 5 MMOL/L — SIGNIFICANT CHANGE UP (ref 5–17)
AST SERPL-CCNC: 36 U/L — SIGNIFICANT CHANGE UP (ref 15–37)
BILIRUB SERPL-MCNC: 0.5 MG/DL — SIGNIFICANT CHANGE UP (ref 0.2–1.2)
BUN SERPL-MCNC: 43 MG/DL — HIGH (ref 7–23)
CALCIUM SERPL-MCNC: 10.3 MG/DL — HIGH (ref 8.5–10.1)
CHLORIDE SERPL-SCNC: 100 MMOL/L — SIGNIFICANT CHANGE UP (ref 96–108)
CO2 SERPL-SCNC: 27 MMOL/L — SIGNIFICANT CHANGE UP (ref 22–31)
CREAT SERPL-MCNC: 1 MG/DL — SIGNIFICANT CHANGE UP (ref 0.5–1.3)
EGFR: 82 ML/MIN/1.73M2 — SIGNIFICANT CHANGE UP
GLUCOSE SERPL-MCNC: 440 MG/DL — HIGH (ref 70–99)
HCT VFR BLD CALC: 24.8 % — LOW (ref 39–50)
HGB BLD-MCNC: 8.1 G/DL — LOW (ref 13–17)
MCHC RBC-ENTMCNC: 29.8 PG — SIGNIFICANT CHANGE UP (ref 27–34)
MCHC RBC-ENTMCNC: 32.7 G/DL — SIGNIFICANT CHANGE UP (ref 32–36)
MCV RBC AUTO: 91.2 FL — SIGNIFICANT CHANGE UP (ref 80–100)
NRBC # BLD: 0 /100 WBCS — SIGNIFICANT CHANGE UP (ref 0–0)
PLATELET # BLD AUTO: 431 K/UL — HIGH (ref 150–400)
POTASSIUM SERPL-MCNC: 5 MMOL/L — SIGNIFICANT CHANGE UP (ref 3.5–5.3)
POTASSIUM SERPL-SCNC: 5 MMOL/L — SIGNIFICANT CHANGE UP (ref 3.5–5.3)
PROT SERPL-MCNC: 6.5 G/DL — SIGNIFICANT CHANGE UP (ref 6–8.3)
RBC # BLD: 2.72 M/UL — LOW (ref 4.2–5.8)
RBC # FLD: 18.7 % — HIGH (ref 10.3–14.5)
SODIUM SERPL-SCNC: 132 MMOL/L — LOW (ref 135–145)
WBC # BLD: 3.22 K/UL — LOW (ref 3.8–10.5)
WBC # FLD AUTO: 3.22 K/UL — LOW (ref 3.8–10.5)

## 2024-12-13 PROCEDURE — 99232 SBSQ HOSP IP/OBS MODERATE 35: CPT

## 2024-12-13 PROCEDURE — 99221 1ST HOSP IP/OBS SF/LOW 40: CPT

## 2024-12-13 RX ADMIN — Medication 88 MICROGRAM(S): at 06:31

## 2024-12-13 RX ADMIN — Medication 500 MILLIGRAM(S): at 17:37

## 2024-12-13 RX ADMIN — ESCITALOPRAM OXALATE 10 MILLIGRAM(S): 10 TABLET, FILM COATED ORAL at 06:34

## 2024-12-13 RX ADMIN — Medication 12: at 08:04

## 2024-12-13 RX ADMIN — Medication 8: at 16:59

## 2024-12-13 RX ADMIN — AMLODIPINE BESYLATE 10 MILLIGRAM(S): 10 TABLET ORAL at 08:54

## 2024-12-13 RX ADMIN — Medication 15 MILLILITER(S): at 11:16

## 2024-12-13 RX ADMIN — Medication 10: at 12:15

## 2024-12-13 RX ADMIN — PANTOPRAZOLE SODIUM 40 MILLIGRAM(S): 40 TABLET, DELAYED RELEASE ORAL at 06:31

## 2024-12-13 RX ADMIN — Medication 50 MILLIGRAM(S): at 11:17

## 2024-12-13 RX ADMIN — SODIUM ZIRCONIUM CYCLOSILICATE 5 GRAM(S): 5 POWDER, FOR SUSPENSION ORAL at 06:32

## 2024-12-13 RX ADMIN — AZATHIOPRINE 50 MILLIGRAM(S): 100 TABLET ORAL at 17:40

## 2024-12-13 RX ADMIN — AZATHIOPRINE 50 MILLIGRAM(S): 100 TABLET ORAL at 06:30

## 2024-12-13 RX ADMIN — Medication 325 MILLIGRAM(S): at 06:31

## 2024-12-13 RX ADMIN — TACROLIMUS 1 MILLIGRAM(S): 5 CAPSULE ORAL at 21:15

## 2024-12-13 RX ADMIN — PANTOPRAZOLE SODIUM 40 MILLIGRAM(S): 40 TABLET, DELAYED RELEASE ORAL at 17:38

## 2024-12-13 RX ADMIN — CLONIDINE HYDROCHLORIDE 0.1 MILLIGRAM(S): 0.3 TABLET ORAL at 21:15

## 2024-12-13 RX ADMIN — ROSUVASTATIN CALCIUM 10 MILLIGRAM(S): 5 TABLET, FILM COATED ORAL at 21:15

## 2024-12-13 RX ADMIN — INSULIN GLARGINE 24 UNIT(S): 100 INJECTION, SOLUTION SUBCUTANEOUS at 21:20

## 2024-12-13 RX ADMIN — Medication 7 UNIT(S): at 12:16

## 2024-12-13 RX ADMIN — ESCITALOPRAM OXALATE 10 MILLIGRAM(S): 10 TABLET, FILM COATED ORAL at 17:37

## 2024-12-13 RX ADMIN — ESCITALOPRAM OXALATE 10 MILLIGRAM(S): 10 TABLET, FILM COATED ORAL at 11:16

## 2024-12-13 RX ADMIN — Medication 8 UNIT(S): at 21:51

## 2024-12-13 RX ADMIN — Medication 300 MILLIGRAM(S): at 11:16

## 2024-12-13 RX ADMIN — Medication 2 TABLET(S): at 21:15

## 2024-12-13 RX ADMIN — TACROLIMUS 2 MILLIGRAM(S): 5 CAPSULE ORAL at 11:16

## 2024-12-13 RX ADMIN — CARVEDILOL 12.5 MILLIGRAM(S): 25 TABLET, FILM COATED ORAL at 08:54

## 2024-12-13 RX ADMIN — HYDRALAZINE HYDROCHLORIDE 100 MILLIGRAM(S): 10 TABLET ORAL at 21:15

## 2024-12-13 RX ADMIN — Medication 5 UNIT(S): at 08:03

## 2024-12-13 RX ADMIN — POLYETHYLENE GLYCOL 3350 17 GRAM(S): 17 POWDER, FOR SOLUTION ORAL at 11:17

## 2024-12-13 RX ADMIN — Medication 7 UNIT(S): at 16:59

## 2024-12-13 RX ADMIN — PREDNISONE 20 MILLIGRAM(S): 20 TABLET ORAL at 06:31

## 2024-12-13 RX ADMIN — HYDRALAZINE HYDROCHLORIDE 100 MILLIGRAM(S): 10 TABLET ORAL at 08:54

## 2024-12-13 RX ADMIN — Medication 500 MILLIGRAM(S): at 06:32

## 2024-12-13 RX ADMIN — CLONIDINE HYDROCHLORIDE 0.1 MILLIGRAM(S): 0.3 TABLET ORAL at 08:54

## 2024-12-13 RX ADMIN — Medication 325 MILLIGRAM(S): at 17:38

## 2024-12-13 NOTE — PROVIDER CONTACT NOTE (CRITICAL VALUE NOTIFICATION) - TEST AND RESULT REPORTED:
H/H 6.5 /19.8
hemoglobin 6.3/19.4
Blood culture done on 11/29 showing growth in aerobic and anaerobic bottles, gram negative (-) rods in both
HGB and HCT resul;ts
Hemoglobin 7
HGB amd HCT
Hemoglobin 6.9
Urine Culture
blood glucose 452
hemoglobin of 7

## 2024-12-13 NOTE — CONSULT NOTE ADULT - CONSULT REQUESTED BY NAME
Dr. Ibrahim
Medicine
Arley CHAVEZ
Dr Upton
Medicine; Dr. Ibrahim
Dr. Ibrahim
ER
Dr. Katiana Tubbs
Dr. Katiana Tubbs

## 2024-12-13 NOTE — PROGRESS NOTE ADULT - ASSESSMENT
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  - CBC noted, transfuse PRN   - PPI for ppx  - Monitor for any overt GI bleeding  - Follow up repeat MRI with IV contrast  - Outside path noted  - ?component of AIH      I reviewed the overnight course of events on the unit, re-confirming the patient history. I discussed the care with the patient.  Differential diagnosis and plan of care discussed with patient after the evaluation.  35 minutes spent on total encounter of which more than fifty percent of the encounter was spent counseling and/or coordinating care by the attending physician.

## 2024-12-13 NOTE — PROGRESS NOTE ADULT - SUBJECTIVE AND OBJECTIVE BOX
[facility]    Remi Sampson NP    [address] [place], [place] [address]  [phone]      Chief Complaint:  Patient is a 69y old  Male who presents with a chief complaint of AMS (13 Dec 2024 12:04)      HPI/ 24 hr events:   Patient seen and examined at bedside  Reports feeling well this morning  He was pending IR guided biopsy yesterday but rescheduled for next monday    Tolerating diet   No acute GI complaints        REVIEW OF SYSTEMS:   General: Negative  HEENT: Negative  CV: Negative  Respiratory: Negative  GI: See HPI  : Negative  MSK: Negative  Hematologic: Negative  Skin: Negative    MEDICATIONS:   MEDICATIONS  (STANDING):  amLODIPine   Tablet 10 milliGRAM(s) Oral daily  ascorbic acid 500 milliGRAM(s) Oral two times a day  azaTHIOprine 50 milliGRAM(s) Oral two times a day  buPROPion XL (24-Hour) . 300 milliGRAM(s) Oral daily  carvedilol 12.5 milliGRAM(s) Oral every 12 hours  cloNIDine 0.1 milliGRAM(s) Oral three times a day  dextrose 5%. 1000 milliLiter(s) (50 mL/Hr) IV Continuous <Continuous>  dextrose 5%. 1000 milliLiter(s) (100 mL/Hr) IV Continuous <Continuous>  dextrose 50% Injectable 25 Gram(s) IV Push once  dextrose 50% Injectable 12.5 Gram(s) IV Push once  dextrose 50% Injectable 25 Gram(s) IV Push once  escitalopram 10 milliGRAM(s) Oral <User Schedule>  ferrous    sulfate 325 milliGRAM(s) Oral two times a day  glucagon  Injectable 1 milliGRAM(s) IntraMuscular once  hydrALAZINE 100 milliGRAM(s) Oral three times a day  insulin glargine Injectable (LANTUS) 24 Unit(s) SubCutaneous at bedtime  insulin lispro (ADMELOG) corrective regimen sliding scale   SubCutaneous three times a day before meals  insulin lispro Injectable (ADMELOG) 7 Unit(s) SubCutaneous three times a day before meals  levothyroxine 88 MICROGram(s) Oral <User Schedule>  lisinopril 40 milliGRAM(s) Oral daily  mineral oil enema 133 milliLiter(s) Rectal once  multivitamin/minerals/iron Oral Solution (CENTRUM) 15 milliLiter(s) Oral daily  pantoprazole    Tablet 40 milliGRAM(s) Oral two times a day  polyethylene glycol 3350 17 Gram(s) Oral daily  predniSONE   Tablet 20 milliGRAM(s) Oral daily  pyridoxine 50 milliGRAM(s) Oral daily  rosuvastatin 10 milliGRAM(s) Oral at bedtime  senna 2 Tablet(s) Oral at bedtime  sodium zirconium cyclosilicate 5 Gram(s) Oral <User Schedule>  tacrolimus 2 milliGRAM(s) Oral daily  tacrolimus 1 milliGRAM(s) Oral at bedtime    MEDICATIONS  (PRN):  aluminum hydroxide/magnesium hydroxide/simethicone Suspension 30 milliLiter(s) Oral every 4 hours PRN Dyspepsia  dextrose Oral Gel 15 Gram(s) Oral once PRN Blood Glucose LESS THAN 70 milliGRAM(s)/deciliter  hydrALAZINE Injectable 10 milliGRAM(s) IV Push every 8 hours PRN SBP >180 and HR 60-70bpm  melatonin 3 milliGRAM(s) Oral at bedtime PRN Insomnia  metoprolol tartrate Injectable 5 milliGRAM(s) IV Push every 6 hours PRN SBP >170  ondansetron Injectable 4 milliGRAM(s) IV Push every 8 hours PRN Nausea and/or Vomiting      ALLERGIES:   Allergies    No Known Allergies    Intolerances        VITAL SIGNS:   Vital Signs Last 24 Hrs  T(C): 36.6 (13 Dec 2024 05:04), Max: 36.9 (12 Dec 2024 13:53)  T(F): 97.8 (13 Dec 2024 05:04), Max: 98.5 (12 Dec 2024 13:53)  HR: 63 (13 Dec 2024 08:43) (60 - 64)  BP: 142/64 (13 Dec 2024 08:43) (100/58 - 144/72)  BP(mean): 61 (12 Dec 2024 20:38) (61 - 61)  RR: 18 (13 Dec 2024 05:04) (18 - 19)  SpO2: 97% (13 Dec 2024 05:04) (97% - 98%)    Parameters below as of 13 Dec 2024 05:04  Patient On (Oxygen Delivery Method): room air      I&O's Summary    12 Dec 2024 07:01  -  13 Dec 2024 07:00  --------------------------------------------------------  IN: 350 mL / OUT: 1700 mL / NET: -1350 mL        PHYSICAL EXAM:   GENERAL:  No acute distress  HEENT:  NC/AT  CHEST:  No increased effort  HEART:  Regular rate  ABDOMEN:  Soft, non-tender, non-distended, positive bowel sounds  EXTREMITIES: No cyanosis  SKIN:  Warm, dry  NEURO:  Calm, cooperative    LABS:                        8.1    3.22  )-----------( 431      ( 13 Dec 2024 06:45 )             24.8     12-13    132[L]  |  100  |  43[H]  ----------------------------<  440[H]  5.0   |  27  |  1.00    Ca    10.3[H]      13 Dec 2024 06:45    TPro  6.5  /  Alb  2.1[L]  /  TBili  0.5  /  DBili  x   /  AST  36  /  ALT  43  /  AlkPhos  116  12-13    LIVER FUNCTIONS - ( 13 Dec 2024 06:45 )  Alb: 2.1 g/dL / Pro: 6.5 g/dL / ALK PHOS: 116 U/L / ALT: 43 U/L / AST: 36 U/L / GGT: x                                             RADIOLOGY & ADDITIONAL STUDIES:

## 2024-12-13 NOTE — PROGRESS NOTE ADULT - SUBJECTIVE AND OBJECTIVE BOX
All interim records and events noted.    awake in bed appear weak, denies pain or SOB      MEDICATIONS  (STANDING):  amLODIPine   Tablet 10 milliGRAM(s) Oral daily  ascorbic acid 500 milliGRAM(s) Oral two times a day  azaTHIOprine 50 milliGRAM(s) Oral two times a day  buPROPion XL (24-Hour) . 300 milliGRAM(s) Oral daily  carvedilol 12.5 milliGRAM(s) Oral every 12 hours  cloNIDine 0.1 milliGRAM(s) Oral three times a day  dextrose 5%. 1000 milliLiter(s) (100 mL/Hr) IV Continuous <Continuous>  dextrose 5%. 1000 milliLiter(s) (50 mL/Hr) IV Continuous <Continuous>  dextrose 50% Injectable 25 Gram(s) IV Push once  dextrose 50% Injectable 12.5 Gram(s) IV Push once  dextrose 50% Injectable 25 Gram(s) IV Push once  escitalopram 10 milliGRAM(s) Oral <User Schedule>  ferrous    sulfate 325 milliGRAM(s) Oral two times a day  glucagon  Injectable 1 milliGRAM(s) IntraMuscular once  hydrALAZINE 100 milliGRAM(s) Oral three times a day  insulin glargine Injectable (LANTUS) 24 Unit(s) SubCutaneous at bedtime  insulin lispro (ADMELOG) corrective regimen sliding scale   SubCutaneous three times a day before meals  insulin lispro Injectable (ADMELOG) 7 Unit(s) SubCutaneous three times a day before meals  levothyroxine 88 MICROGram(s) Oral <User Schedule>  lisinopril 40 milliGRAM(s) Oral daily  mineral oil enema 133 milliLiter(s) Rectal once  multivitamin/minerals/iron Oral Solution (CENTRUM) 15 milliLiter(s) Oral daily  pantoprazole    Tablet 40 milliGRAM(s) Oral two times a day  polyethylene glycol 3350 17 Gram(s) Oral daily  predniSONE   Tablet 20 milliGRAM(s) Oral daily  pyridoxine 50 milliGRAM(s) Oral daily  rosuvastatin 10 milliGRAM(s) Oral at bedtime  senna 2 Tablet(s) Oral at bedtime  sodium zirconium cyclosilicate 5 Gram(s) Oral <User Schedule>  tacrolimus 1 milliGRAM(s) Oral at bedtime  tacrolimus 2 milliGRAM(s) Oral daily    MEDICATIONS  (PRN):  aluminum hydroxide/magnesium hydroxide/simethicone Suspension 30 milliLiter(s) Oral every 4 hours PRN Dyspepsia  dextrose Oral Gel 15 Gram(s) Oral once PRN Blood Glucose LESS THAN 70 milliGRAM(s)/deciliter  hydrALAZINE Injectable 10 milliGRAM(s) IV Push every 8 hours PRN SBP >180 and HR 60-70bpm  melatonin 3 milliGRAM(s) Oral at bedtime PRN Insomnia  metoprolol tartrate Injectable 5 milliGRAM(s) IV Push every 6 hours PRN SBP >170  ondansetron Injectable 4 milliGRAM(s) IV Push every 8 hours PRN Nausea and/or Vomiting      Vital Signs Last 24 Hrs  T(C): 36.6 (13 Dec 2024 05:04), Max: 36.9 (12 Dec 2024 13:53)  T(F): 97.8 (13 Dec 2024 05:04), Max: 98.5 (12 Dec 2024 13:53)  HR: 63 (13 Dec 2024 08:43) (60 - 64)  BP: 142/64 (13 Dec 2024 08:43) (100/58 - 144/72)  BP(mean): 61 (12 Dec 2024 20:38) (61 - 61)  RR: 18 (13 Dec 2024 05:04) (18 - 19)  SpO2: 97% (13 Dec 2024 05:04) (97% - 98%)    Parameters below as of 13 Dec 2024 05:04  Patient On (Oxygen Delivery Method): room air        PHYSICAL EXAM  General: in no acute distress  Head: atraumatic, normocephalic  ENT: sclera anicteric, buccal mucosa moist  Neck: supple, trachea midline  CV: S1 S2, regular rate and rhythm  Lungs: clear to auscultation, no wheezes/rhonchi  Abdomen: soft, nontender, bowel sounds present, no palpable masses  Extrem: no clubbing/cyanosis/edema  Skin: no significant increased ecchymosis/petechiae  Neuro: alert and responsive,  no focal deficits      LABS:             8.1    3.22  )-----------( 431      ( 12-13 @ 06:45 )             24.8                8.8    3.77  )-----------( 453      ( 12-12 @ 06:25 )             26.9                8.4    4.51  )-----------( 438      ( 12-11 @ 10:30 )             25.0       12-13    132[L]  |  100  |  43[H]  ----------------------------<  440[H]  5.0   |  27  |  1.00    Ca    10.3[H]      13 Dec 2024 06:45    TPro  6.5  /  Alb  2.1[L]  /  TBili  0.5  /  DBili  x   /  AST  36  /  ALT  43  /  AlkPhos  116  12-13        RADIOLOGY & ADDITIONAL STUDIES:    IMPRESSION/RECOMMENDATIONS:

## 2024-12-13 NOTE — PROVIDER CONTACT NOTE (CRITICAL VALUE NOTIFICATION) - SITUATION
Brooklyn martinez called with results of HGB 6.9 HCT 20.2
Lab called with Urine culture  final results
 called with HGB and HCT results
Pt Accu-Chek 445. Repeat was 452.
Patient awake, no s/s of bleeding.
hemoglobin 7
Sepsis/UTI
Lab called with critical blood culture, showing growth in aerobic and anaerobic bottles, gram negative (-) rods in both
bed rails

## 2024-12-13 NOTE — PROGRESS NOTE ADULT - ASSESSMENT
Patient is a 68yo M, PMH DM, HTN, HLD, h/o kidney transplant, hypothyroidism, presents to ED from [facility] for AMS. Patient is lethargic and unable to provide history at this time.    ESBL Bacteremia 2/2 Acute Cystitis  AMS 2/2 above  Febrile in the .5  UA positive for UTI  Flu/COVID/RSV negative  11/29 BCx ESBL E.coli , repeat negative  11/29 UCx   50,000 - 99,000 CFU/mL Escherichia coli  S/p ertapenem x10 day course    Liver Mass w/ mets  ?Sacral OM  imaging studies noted in re: sacral findings--as there is no sacral wound present, suspect findings on sacral more related to mets from possible malignancy  s/p IR guided liver bx 12/12    Recommendations:   Pending path from liver bx  Monitor off ABx  Trend temps/WBC  Monitor Hgb, transfusion prn protocol  Per Ortho no surgical intervention for fracture of sacrum    Additional care per primary team    Please call with any further questions.  Vanessa Saul M.D.  [facility] Division of Infectious Diseases [phone]  For after 5 P.M. and weekends, please call [phone]  Available on [employer] TEAMS   Patient is a 70yo M, PMH DM, HTN, HLD, h/o kidney transplant, hypothyroidism, presents to ED from [facility] for AMS. Patient is lethargic and unable to provide history at this time.    ESBL Bacteremia 2/2 Acute Cystitis  AMS 2/2 above  Febrile in the .5  UA positive for UTI  Flu/COVID/RSV negative  11/29 BCx ESBL E.coli , repeat negative  11/29 UCx   50,000 - 99,000 CFU/mL Escherichia coli  S/p ertapenem x10 day course    Liver Mass w/ mets  ?Sacral OM  imaging studies noted in re: sacral findings--as there is no sacral wound present, suspect findings on sacral more related to mets from possible malignancy  Pending IR guided liver bx    Recommendations:  Pending IR guided liver bx  Monitor off ABx  Trend temps/WBC  Monitor Hgb, transfusion prn protocol  Per Ortho no surgical intervention for fracture of sacrum    Additional care per primary team    Please call with any further questions.  Vanessa Saul M.D.  [facility] Division of Infectious Diseases [phone]  For after 5 P.M. and weekends, please call [phone]  Available on [employer] TEAMS

## 2024-12-13 NOTE — CONSULT NOTE ADULT - PROBLEM SELECTOR PROBLEM 1
Uncontrolled type 2 diabetes mellitus with hyperglycemia
Gram negative sepsis
Uncontrolled type 2 diabetes mellitus with hyperglycemia

## 2024-12-13 NOTE — CONSULT NOTE ADULT - NEGATIVE GASTROINTESTINAL SYMPTOMS
Fentanyl Approved    Prior Authorization Number: [id number]  Dates of approval: 06/30/2020-03/28/2021  Filling [facility]: [facility]  Additional Information: Pharmacy contacted - received paid claim.  Pharmacy will contact patient when medication is ready to be picked up.       Brian  09/29/2020 YAN  KEY# [id number]      
no nausea/no vomiting/no diarrhea/no abdominal pain

## 2024-12-13 NOTE — CHART NOTE - NSCHARTNOTEFT_GEN_A_CORE
Assessment: patient seen for follow up. chart reviewed .hospital course noted  69y old  Male who presents with a chief complaint of AMS CKD stage 3 , liver mass with mets . AMS 2/2 Sepsis 2/2 multiple infections (UTI, sacral infection, possible osteomyelitis), E coli bacteremia   acute  metabolic encephalopathy , hx DM, HTN kidney transplant  12/13 fecal incontinence   per RN patient with good PO intake feeds himself. patient seen in bed at time of visit unable to interview patient         Factors impacting intake: [ ] none [ ] nausea  [ ] vomiting [ ] diarrhea [ ] constipation  [ ]chewing problems [x ] swallowing issues  [ ] other: soft and bite sized per speech evaluation     Diet Prescription: soft and bite sized consistent cho dash tlc  low K ensure max BID  Intake: up to 100% of meal taken per flow sheet    Current Weight: varying weight this admit 12/12 wt 202.1# 12/2 186.2#     Pertinent Medications: MEDICATIONS  (STANDING):  amLODIPine   Tablet 10 milliGRAM(s) Oral daily  ascorbic acid 500 milliGRAM(s) Oral two times a day  azaTHIOprine 50 milliGRAM(s) Oral two times a day  buPROPion XL (24-Hour) . 300 milliGRAM(s) Oral daily  carvedilol 12.5 milliGRAM(s) Oral every 12 hours  cloNIDine 0.1 milliGRAM(s) Oral three times a day  dextrose 5%. 1000 milliLiter(s) (50 mL/Hr) IV Continuous <Continuous>  dextrose 5%. 1000 milliLiter(s) (100 mL/Hr) IV Continuous <Continuous>  dextrose 50% Injectable 25 Gram(s) IV Push once  dextrose 50% Injectable 12.5 Gram(s) IV Push once  dextrose 50% Injectable 25 Gram(s) IV Push once  escitalopram 10 milliGRAM(s) Oral <User Schedule>  ferrous    sulfate 325 milliGRAM(s) Oral two times a day  glucagon  Injectable 1 milliGRAM(s) IntraMuscular once  hydrALAZINE 100 milliGRAM(s) Oral three times a day  insulin glargine Injectable (LANTUS) 24 Unit(s) SubCutaneous at bedtime  insulin lispro (ADMELOG) corrective regimen sliding scale   SubCutaneous three times a day before meals  insulin lispro Injectable (ADMELOG) 7 Unit(s) SubCutaneous three times a day before meals  levothyroxine 88 MICROGram(s) Oral <User Schedule>  lisinopril 40 milliGRAM(s) Oral daily  mineral oil enema 133 milliLiter(s) Rectal once  multivitamin/minerals/iron Oral Solution (CENTRUM) 15 milliLiter(s) Oral daily  pantoprazole    Tablet 40 milliGRAM(s) Oral two times a day  polyethylene glycol 3350 17 Gram(s) Oral daily  predniSONE   Tablet 20 milliGRAM(s) Oral daily  pyridoxine 50 milliGRAM(s) Oral daily  rosuvastatin 10 milliGRAM(s) Oral at bedtime  senna 2 Tablet(s) Oral at bedtime  sodium zirconium cyclosilicate 5 Gram(s) Oral <User Schedule>  tacrolimus 2 milliGRAM(s) Oral daily  tacrolimus 1 milliGRAM(s) Oral at bedtime    MEDICATIONS  (PRN):  aluminum hydroxide/magnesium hydroxide/simethicone Suspension 30 milliLiter(s) Oral every 4 hours PRN Dyspepsia  dextrose Oral Gel 15 Gram(s) Oral once PRN Blood Glucose LESS THAN 70 milliGRAM(s)/deciliter  hydrALAZINE Injectable 10 milliGRAM(s) IV Push every 8 hours PRN SBP >180 and HR 60-70bpm  melatonin 3 milliGRAM(s) Oral at bedtime PRN Insomnia  metoprolol tartrate Injectable 5 milliGRAM(s) IV Push every 6 hours PRN SBP >170  ondansetron Injectable 4 milliGRAM(s) IV Push every 8 hours PRN Nausea and/or Vomiting    Pertinent Labs: K 5.0 Na 132   Skin: sacrum stage 1    Estimated Needs:   [x] no change since previous assessment on: 12/2 based on #  kcal/day: 2175-4565 liban  gms protein/day: 93-108gms    Previous Nutrition Diagnosis:    [x ]Inadequate Oral Intake   (X) altered nutrition related labs  Nutrition Diagnosis is [x ] ongoing  [x ] resolved inadequate oral intake  [ ] not applicable     New Nutrition Diagnosis: [x ] not applicable       Interventions:   Recommend  [ ] Change Diet To:  [ ] Nutrition Supplement  [ ] Nutrition Support  [ x] Other: continue diet as ordered, trend K level , trend weights     Monitoring and Evaluation:   [x] PO intake [ x ] Tolerance to diet prescription [ x ] weights [ x ] labs[ x ] follow up per protocol  [ ] other:

## 2024-12-13 NOTE — PROGRESS NOTE ADULT - ASSESSMENT
70yo man w hx renal transplant, adm w osteomyelitis, and Ecoli urosepsis w positive urine and blood cultures  Hgb anemia hgb 6.8  w/u---  no iron, B12 folate deficiency  etiology anemia due to chronic ds, acute illness, inflammation, sepsis  SIFE weak IgM lambda monoclonal gammapthy, elevated k, l nad k/l ratio, nromal IgA/M/G,   5cm liver mass--Prior known, s/p liver bx at [facility], wife brought in report which shows 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 also noted  14cm splenomegaly since 2018      -Hgb again stable since latest transfusion PRBC, adequate at presnt level  -?lymphoplasmacytic ds in view of previous liver bx, and the IgM l monoclonal gammapthy, liver and spleen findings  -for liver bx Monday  -continue follow serial CBC CMP

## 2024-12-13 NOTE — CONSULT NOTE ADULT - PROBLEM SELECTOR RECOMMENDATION 9
lantus increased 24 units qhs  admelog increased 7 units 3x/day before meals  cont mod dose admelog corrective scale coverage qac/qhs  cont cons cho diet  goal bg 100-180 in hosp setting
Type 2 A1c 7.4% adm sepsis  increase lantus to 30 units @ HS  admelog 7 units tidac  mod ISS ACHS  CC diet and accucheck ACHS  Recommend endocrine-Perlman onconsult  FU appt: TBA  DSC recommendations: return to home regimen and glucose monitoring  diabetes education provided as documented above  Diabetes support info and cell # [phone] given   Goal 100-180 mg/dL; 140-180 mg/dL in critical care areas

## 2024-12-13 NOTE — CONSULT NOTE ADULT - CONSULT REQUESTED DATE/TIME
04-Dec-2024 13:46
29-Nov-2024 14:43
29-Nov-2024 14:51
29-Nov-2024 16:52
30-Nov-2024 09:55
29-Nov-2024 14:42
13-Dec-2024 09:20
30-Nov-2024 16:06
13-Dec-2024 14:07

## 2024-12-13 NOTE — CONSULT NOTE ADULT - SUBJECTIVE AND OBJECTIVE BOX
Patient is a 69y old  Male who presents with a chief complaint of AMS (13 Dec 2024 08:30)      Reason For Consult: dm2 uncontrolled    HPI:  Patient is a 70yo M, PMH DM, HTN, HLD, h/o kidney transplant, hypothyroidism, presents to ED from [facility] for AMS. Patient is lethargic and unable to provide history at this time. Information obtained from transfer record and chart.   Patient received Zosyn and Azithromycin 11/12-11/18 for PNA per transfer record.  UA positive for UTI  pan scan revealed:  Small left pleural effusion with small loculated components  Small to moderate pericardial effusion.  Approximately 5.5 cm low-density lesion anterior right hepatic lobe.  Atrophic right kidney.  Absent left kidney.  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  Colonic fecal retention  Stercoral proctitis.  Patchy sclerosis and osteolysis throughout the bilateral sacrum with   pathologic fractures.There is soft tissue with stranding extending   throughout the presacral and posterior extraperitoneal pelvic soft   tissues.  There are a few bubbles of air/gas at the right sacral pathologic   fracture, findings suggestive of   infection/osteomyelitis.     (29 Nov 2024 12:47)      PAST MEDICAL & SURGICAL HISTORY:  Diabetes Mellitus Type II  Insulin pump (2010)      GERD (gastroesophageal reflux disease)      Depression      Hypothyroidism      Hyperlipidemia      Kidney transplanted  2012      Hypertension      ANGELIKA (obstructive sleep apnea)      Peripheral neuropathy      Shoulder fracture  Left.      History of colonoscopy      S/P kidney transplant  2012      S/P cholecystectomy      Retroperitoneal fibrosis  s/p surgery      AV fistula  Right arm      S/P right cataract extraction      S/P left cataract extraction      H/O unilateral nephrectomy  Left          FAMILY HISTORY:  MI (myocardial infarction)    Family history of obesity (Sibling)          Social History:    MEDICATIONS  (STANDING):  amLODIPine   Tablet 10 milliGRAM(s) Oral daily  ascorbic acid 500 milliGRAM(s) Oral two times a day  azaTHIOprine 50 milliGRAM(s) Oral two times a day  buPROPion XL (24-Hour) . 300 milliGRAM(s) Oral daily  carvedilol 12.5 milliGRAM(s) Oral every 12 hours  cloNIDine 0.1 milliGRAM(s) Oral three times a day  dextrose 5%. 1000 milliLiter(s) (50 mL/Hr) IV Continuous <Continuous>  dextrose 5%. 1000 milliLiter(s) (100 mL/Hr) IV Continuous <Continuous>  dextrose 50% Injectable 25 Gram(s) IV Push once  dextrose 50% Injectable 12.5 Gram(s) IV Push once  dextrose 50% Injectable 25 Gram(s) IV Push once  escitalopram 10 milliGRAM(s) Oral <User Schedule>  ferrous    sulfate 325 milliGRAM(s) Oral two times a day  glucagon  Injectable 1 milliGRAM(s) IntraMuscular once  hydrALAZINE 100 milliGRAM(s) Oral three times a day  insulin glargine Injectable (LANTUS) 24 Unit(s) SubCutaneous at bedtime  insulin lispro (ADMELOG) corrective regimen sliding scale   SubCutaneous three times a day before meals  insulin lispro Injectable (ADMELOG) 7 Unit(s) SubCutaneous three times a day before meals  levothyroxine 88 MICROGram(s) Oral <User Schedule>  lisinopril 40 milliGRAM(s) Oral daily  mineral oil enema 133 milliLiter(s) Rectal once  multivitamin/minerals/iron Oral Solution (CENTRUM) 15 milliLiter(s) Oral daily  pantoprazole    Tablet 40 milliGRAM(s) Oral two times a day  polyethylene glycol 3350 17 Gram(s) Oral daily  predniSONE   Tablet 20 milliGRAM(s) Oral daily  pyridoxine 50 milliGRAM(s) Oral daily  rosuvastatin 10 milliGRAM(s) Oral at bedtime  senna 2 Tablet(s) Oral at bedtime  sodium zirconium cyclosilicate 5 Gram(s) Oral <User Schedule>  tacrolimus 2 milliGRAM(s) Oral daily  tacrolimus 1 milliGRAM(s) Oral at bedtime    MEDICATIONS  (PRN):  aluminum hydroxide/magnesium hydroxide/simethicone Suspension 30 milliLiter(s) Oral every 4 hours PRN Dyspepsia  dextrose Oral Gel 15 Gram(s) Oral once PRN Blood Glucose LESS THAN 70 milliGRAM(s)/deciliter  hydrALAZINE Injectable 10 milliGRAM(s) IV Push every 8 hours PRN SBP >180 and HR 60-70bpm  melatonin 3 milliGRAM(s) Oral at bedtime PRN Insomnia  metoprolol tartrate Injectable 5 milliGRAM(s) IV Push every 6 hours PRN SBP >170  ondansetron Injectable 4 milliGRAM(s) IV Push every 8 hours PRN Nausea and/or Vomiting        T(C): 36.6 (12-13-24 @ 05:04), Max: 36.9 (12-12-24 @ 12:21)  HR: 63 (12-13-24 @ 08:43) (60 - 64)  BP: 142/64 (12-13-24 @ 08:43) (100/58 - 144/72)  RR: 18 (12-13-24 @ 05:04) (18 - 19)  SpO2: 97% (12-13-24 @ 05:04) (96% - 98%)  Wt(kg): --    PHYSICAL EXAM:  GENERAL: NAD, well-groomed, well-developed  HEAD:  Atraumatic, Normocephalic  NECK: Supple, No JVD, Normal thyroid  CHEST/LUNG: Clear to percussion bilaterally; No rales, rhonchi, wheezing, or rubs  HEART: Regular rate and rhythm; No murmurs, rubs, or gallops  ABDOMEN: Soft, Nontender, Nondistended; Bowel sounds present  EXTREMITIES:  2+ Peripheral Pulses, No clubbing, cyanosis, or edema  SKIN: No rashes or lesions    CAPILLARY BLOOD GLUCOSE      POCT Blood Glucose.: 452 mg/dL (13 Dec 2024 07:51)  POCT Blood Glucose.: 445 mg/dL (13 Dec 2024 07:50)  POCT Blood Glucose.: 345 mg/dL (12 Dec 2024 16:56)  POCT Blood Glucose.: 286 mg/dL (12 Dec 2024 11:42)                            8.1    3.22  )-----------( 431      ( 13 Dec 2024 06:45 )             24.8       CMP:  12-13 @ 06:45  SGPT 43  Albumin 2.1   Alk Phos 116   Anion Gap 5   SGOT 36   Total Bili 0.5   BUN 43   Calcium Total 10.3   CO2 27   Chloride 100   Creatinine 1.00   eGFR if AA --   eGFR if non AA --   Glucose 440   Potassium 5.0   Protein 6.5   Sodium 132      Thyroid Function Tests:      Diabetes Tests:       Radiology:

## 2024-12-13 NOTE — PROGRESS NOTE ADULT - ASSESSMENT
CKD 3, h/o Kidney transplant ~2012, h/o Left Nephrectomy  Diabetes  Hypertension  Sepsis, UTI, Sacral osteomyelitis, Gram negative bacteremia  Hypokalemia  Hypercalcemia, Elevated 1,25 Vitamin D (ACEI level: WNL), R/o Lymphoma  Hypomagnesemia  Hypophosphatemia  Anemia  + Liver mass    12/09/24: Stable renal indices. To continue current immuno suppressants. For MRI. Calcium levels slowly trending down. .  Monitor blood sugar levels. Insulin coverage as needed. Dietary restriction. Will lower lisinopril dose if BP remains low. Trend BP and titrate BP meds as needed.   Trend BP and titrate BP meds as needed. Hematology follow up. Will follow electrolytes and renal function trend.   12/10/24: Stable renal indices. Calcium levels now worsening again. Will dose steroids once blood sugar better controlled. Pt received aredia 12/06/24. MRI results pending.   12/11/24: Stable renal indices. MRI results noted. Calcium trending down. For ? liver biopsy. Add steroids for hypercalcemia.   12/12/24: For liver biopsy but cancelled due to hyperkalemia. D/w IR earlier and as per discussion with anesthesia procedure will be cancelled for K 5.6. Lokelma ordered.   Low K diet. Continue steroids for hypercalcemia. Hematology follow up.   12/13/24: Liver biopsy rescheduled. Continue low potassium diet. Calcium trending down on steroids. To continue current meds.

## 2024-12-13 NOTE — CONSULT NOTE ADULT - PROVIDER SPECIALTY LIST ADULT
Cardiology
Nephrology
Surgery
Diabetes
Orthopedics
Gastroenterology
Infectious Disease
Endocrinology
Heme/Onc

## 2024-12-13 NOTE — PROVIDER CONTACT NOTE (CRITICAL VALUE NOTIFICATION) - ACTION/TREATMENT ORDERED:
will  order HGG sample
provider notified
Dr. Upton will repeat H/H
Dr. Pittman will consult hemotology
orders to be adjusted

## 2024-12-13 NOTE — PROGRESS NOTE ADULT - SUBJECTIVE AND OBJECTIVE BOX
[facility] -- Tom Rahman, Osorio Castillo Savella, , Lisa Covington  Office # [phone]    Follow Up: Uncontrolled HTN, Pericardial Effusion     Subjective/Observations: Awake and alert, remains comfortable on RA.  No complaints.  Eating breakfast independently    REVIEW OF SYSTEMS: All other review of systems is negative unless indicated above  PAST MEDICAL & SURGICAL HISTORY:  Diabetes Mellitus Type II  Insulin pump (2010)  GERD (gastroesophageal reflux disease)  Depression  Hypothyroidism  Hyperlipidemia  Kidney transplanted  2012  Hypertension  ANGELIKA (obstructive sleep apnea)  Peripheral neuropathy  Shoulder fracture  Left.  History of colonoscopy  S/P kidney transplant  2012  S/P cholecystectomy  Retroperitoneal fibrosis  s/p surgery  AV fistula  Right arm  S/P right cataract extraction  S/P left cataract extraction  H/O unilateral nephrectomy  Left    MEDICATIONS  (STANDING):  amLODIPine   Tablet 10 milliGRAM(s) Oral daily  ascorbic acid 500 milliGRAM(s) Oral two times a day  azaTHIOprine 50 milliGRAM(s) Oral two times a day  buPROPion XL (24-Hour) . 300 milliGRAM(s) Oral daily  carvedilol 12.5 milliGRAM(s) Oral every 12 hours  cloNIDine 0.1 milliGRAM(s) Oral three times a day  dextrose 5%. 1000 milliLiter(s) (100 mL/Hr) IV Continuous <Continuous>  dextrose 5%. 1000 milliLiter(s) (50 mL/Hr) IV Continuous <Continuous>  dextrose 50% Injectable 25 Gram(s) IV Push once  dextrose 50% Injectable 12.5 Gram(s) IV Push once  dextrose 50% Injectable 25 Gram(s) IV Push once  escitalopram 10 milliGRAM(s) Oral <User Schedule>  ferrous    sulfate 325 milliGRAM(s) Oral two times a day  glucagon  Injectable 1 milliGRAM(s) IntraMuscular once  hydrALAZINE 100 milliGRAM(s) Oral three times a day  insulin glargine Injectable (LANTUS) 24 Unit(s) SubCutaneous at bedtime  insulin lispro (ADMELOG) corrective regimen sliding scale   SubCutaneous three times a day before meals  insulin lispro Injectable (ADMELOG) 7 Unit(s) SubCutaneous three times a day before meals  levothyroxine 88 MICROGram(s) Oral <User Schedule>  lisinopril 40 milliGRAM(s) Oral daily  mineral oil enema 133 milliLiter(s) Rectal once  multivitamin/minerals/iron Oral Solution (CENTRUM) 15 milliLiter(s) Oral daily  pantoprazole    Tablet 40 milliGRAM(s) Oral two times a day  polyethylene glycol 3350 17 Gram(s) Oral daily  predniSONE   Tablet 20 milliGRAM(s) Oral daily  pyridoxine 50 milliGRAM(s) Oral daily  rosuvastatin 10 milliGRAM(s) Oral at bedtime  senna 2 Tablet(s) Oral at bedtime  sodium zirconium cyclosilicate 5 Gram(s) Oral <User Schedule>  tacrolimus 1 milliGRAM(s) Oral at bedtime  tacrolimus 2 milliGRAM(s) Oral daily    MEDICATIONS  (PRN):  aluminum hydroxide/magnesium hydroxide/simethicone Suspension 30 milliLiter(s) Oral every 4 hours PRN Dyspepsia  dextrose Oral Gel 15 Gram(s) Oral once PRN Blood Glucose LESS THAN 70 milliGRAM(s)/deciliter  hydrALAZINE Injectable 10 milliGRAM(s) IV Push every 8 hours PRN SBP >180 and HR 60-70bpm  melatonin 3 milliGRAM(s) Oral at bedtime PRN Insomnia  metoprolol tartrate Injectable 5 milliGRAM(s) IV Push every 6 hours PRN SBP >170  ondansetron Injectable 4 milliGRAM(s) IV Push every 8 hours PRN Nausea and/or Vomiting    Allergies    No Known Allergies    Intolerances    Vital Signs Last 24 Hrs  T(C): 36.6 (13 Dec 2024 05:04), Max: 36.9 (12 Dec 2024 12:21)  T(F): 97.8 (13 Dec 2024 05:04), Max: 98.5 (12 Dec 2024 12:21)  HR: 63 (13 Dec 2024 08:43) (60 - 64)  BP: 142/64 (13 Dec 2024 08:43) (100/58 - 144/72)  BP(mean): 61 (12 Dec 2024 20:38) (61 - 61)  RR: 18 (13 Dec 2024 05:04) (18 - 19)  SpO2: 97% (13 Dec 2024 05:04) (96% - 98%)    Parameters below as of 13 Dec 2024 05:04  Patient On (Oxygen Delivery Method): room air    I&O's Summary    12 Dec 2024 07:01  -  13 Dec 2024 07:00  --------------------------------------------------------  IN: 350 mL / OUT: 1700 mL / NET: -1350 mL     PHYSICAL EXAM:  TELE: Not on tele  Constitutional: NAD, asleep but arousable.  Unable to provide ROS  HEENT: Moist Mucous Membranes, Anicteric  Pulmonary: Non-labored, breath sounds are clear bilaterally, No wheezing, rales or rhonchi  Cardiovascular: Regular, S1 and S2, No murmurs, rubs, gallops or clicks  Gastrointestinal: Bowel Sounds present, soft, nontender.   Lymph: No peripheral edema. No lymphadenopathy.  Skin: No visible rashes or ulcers.  Psych:  Mood & affect: Pleasantly confused    LABS: All Labs Reviewed:                        8.1    3.22  )-----------( 431      ( 13 Dec 2024 06:45 )             24.8                         8.8    3.77  )-----------( 453      ( 12 Dec 2024 06:25 )             26.9                         8.4    4.51  )-----------( 438      ( 11 Dec 2024 10:30 )             25.0     13 Dec 2024 06:45    132    |  100    |  43     ----------------------------<  440    5.0     |  27     |  1.00   12 Dec 2024 13:00    x      |  x      |  x      ----------------------------<  x      5.0     |  x      |  x      12 Dec 2024 10:55    134    |  102    |  38     ----------------------------<  290    5.2     |  25     |  0.82     Ca    10.3       13 Dec 2024 06:45  Ca    10.7       12 Dec 2024 10:55  Ca    11.2       12 Dec 2024 06:25    TPro  6.5    /  Alb  2.1    /  TBili  0.5    /  DBili  x      /  AST  36     /  ALT  43     /  AlkPhos  116    13 Dec 2024 06:45  TPro  7.3    /  Alb  2.1    /  TBili  0.7    /  DBili  x      /  AST  51     /  ALT  47     /  AlkPhos  136    12 Dec 2024 06:25  TPro  7.3    /  Alb  2.1    /  TBili  0.8    /  DBili  x      /  AST  63     /  ALT  45     /  AlkPhos  132    11 Dec 2024 10:30    12 Lead ECG:   Ventricular Rate 81 BPM    Atrial Rate 81 BPM    P-R Interval 148 ms    QRS Duration 104 ms    Q-T Interval 406 ms    QTC Calculation(Bazett) 471 ms    P Axis 44 degrees    R Axis 12 degrees    T Axis 53 degrees    Diagnosis Line Normal sinus rhythm  Normal ECG  When compared with ECG of 29-NOV-2024 07:11,  Nonspecific T wave abnormality, improved in Lateral leads  Confirmed by Kya Gonzalez ([id number]) on 12/1/2024 10:43:18 AM (12-01-24 @ 09:28)      TRANSTHORACIC ECHOCARDIOGRAM REPORT  ________________________________________________________________________________                                      _______       Pt. Name:       JAN CALVIN Study Date:    11/29/2024  MRN:            [id number]          YOB: 1955  Accession #:    [id number]         Age:           69 years  Account#:       [id number]        Gender:        M  Heart Rate:                       Height:        64.17 in (163.00 cm)  Rhythm:       Weight:        169.75 lb (77.00 kg)  Blood Pressure: 196/81 mmHg       BSA/BMI:       1.83 m² / 28.98 kg/m²  ________________________________________________________________________________________  Referring Physician:    [id number] Ale Ibrahim  Interpreting Physician: Kya Gonzalez MD  Primary Sonographer:    Butch Salazar    CPT:               ECHO TTE WO CON COMP W DOPP - 82108.m  Indication(s):     Cardiac murmur, unspecified - R01.1  Procedure:         Transthoracic echocardiogram with 2-D, M-mode and complete                     spectral and color flow Doppler.  Ordering Location: [facility]  Admission Status:  ED  ______________________________________________________________________________________     CONCLUSIONS:      1. Left ventricular cavity is normal in size. Left ventricular systolic function is normal with an ejection fraction of 60 % by Moreno's method of disks.   2. Trace pericardial effusion noted adjacent to the posterior left ventricle.   3. Normal right ventricular cavity size and normal right ventricular systolic function.   4. Aortic valve anatomy cannot be determined with normal systolic excursion. There is calcification of the aortic valve leaflets.   5. Structurally normal mitral valve with normalleaflet excursion.   6. Structurally normal tricuspid valve with normal leaflet excursion. Trace tricuspid regurgitation.   7. The inferior vena cava is normal in size measuring 1.36 cm in diameter, (normal <2.1cm) with normal inspiratory collapse (normal >50%) consistent with normal right atrial pressure (~3, range 0-5mmHg).  ________________________________________________________________________________________  FINDINGS:     Left Ventricle:  The left ventricular cavity is normal in size. Left ventricular systolic function is normal with a calculated ejection fraction of 60 % by the Moreno's biplane method of disks.     Right Ventricle:  The right ventricular cavity is normal in size and right ventricular systolic function is normal. Tricuspid annular plane systolic excursion (TAPSE) is 2.9 cm (normal >=1.7 cm).     Left Atrium:  The left atrium is normal in size with an indexed volume of 33.15 ml/m².     Right Atrium:  The right atrium is normal in size with an indexed volume of 21.17 ml/m².     Interatrial Septum:  The interatrial septum appears intact.     Aortic Valve:  The aortic valve anatomy cannot be determined with normal systolic excursion. There is calcification of the aortic valve leaflets. The peak transaortic velocity is 2.15 m/s, peak transaortic gradient is 18.5 mmHg and mean transaortic gradient is 11.0 mmHg with an LVOT/aortic valve VTI ratio of 0.64. The aortic valve area is estimated at 2.67 cm² by the continuity equation. There is no evidence of aortic regurgitation.     Mitral Valve:  Structurally normal mitral valve with normal leaflet excursion. There is calcification of the mitral valve annulus.     Tricuspid Valve:  Structurally normal tricuspid valve with normal leaflet excursion. There is trace tricuspid regurgitation. Estimated pulmonary artery systolic pressure is 8 mmHg.     Aorta:  The aortic root at the sinuses of Valsalva is normal in size, measuring 3.50 cm (indexed 1.91 cm/m²). The ascending aorta is dilated, measuring 4.10 cm (indexed 2.24 cm/m²).     Pericardium:  There is a trace pericardial effusion noted adjacent to the posterior left ventricle.     Systemic Veins:  The inferior vena cava is normal in size measuring 1.36 cm in diameter, (normal <2.1cm) with normal inspiratory collapse (normal >50%) consistent with normal right atrial pressure (~3, range 0-5mmHg).  ____________________________________________________________________  QUANTITATIVE DATA:  Left Ventricle Measurements: (Indexed to BSA)     IVSd (2D):   1.0 cm  LVPWd (2D):  1.0 cm  LVIDd (2D):  5.3 cm  LVIDs (2D):  3.6 cm  LV Mass:     203 g  111.2 g/m²  LV Vol d, MOD A2C: 97.8 ml  53.50 ml/m²  LV Vol d, MOD A4C: 121.0 ml 66.19 ml/m²  LV Vol d, MOD BP:  109.5 ml 59.90 ml/m²  LV Vol s, MOD A2C: 36.4 ml  19.91 ml/m²  LV Vol s, MOD A4C: 49.5 ml  27.08 ml/m²  LV Vol s, MOD BP:  44.0 ml  24.04 ml/m²  LVOT SV MOD BP:    65.5 ml  LV EF% MOD BP:     60 %     MV E Vmax:    1.02 m/s  MV A Vmax:    0.93 m/s  MV E/A:       1.10  e' lateral:   13.10 cm/s  e' medial:    9.25 cm/s  E/e' lateral: 7.79  E/e' medial:  11.03  E/e' Average: 9.13  MV DT:        151 msec    Aorta Measurements: (Normal range) (Indexed to BSA)     Ao Root d     3.50 cm (3.1 - 3.7 cm) 1.91 cm/m²  Ao Asc d, 2D: 4.10  Ao Asc prox:  4.10 cm               2.24 cm/m²    Left Atrium Measurements: (Indexed to BSA)  LA Diam 2D: 4.40 cm    Right Ventricle Measurements: Right Atrial Measurements:     TAPSE:            2.9 cm      RA Vol:            38.70 ml  RV Base (RVID1):  3.7cm      RA Vol s, MOD A4C  38.7 ml  RV Mid (RVID2):   3.1 cm      RA Vol Index:      21.17 ml/m²  RV Major (RVID3): 8.0 cm      RA Area s, MOD A4C 14.7 cm²  LVOT / RVOT/ Qp/Qs Data: (Indexed to BSA)  LVOT Diameter:  2.30 cm  LVOT Area:      4.15cm²  LVOT Vmax:      1.34 m/s  LVOT Vmn:       0.949 m/s  LVOT VTI:       26.30 cm  LVOT peak grad: 7 mmHg  LVOT mean grad: 4.0 mmHg  LVOT SV:        109.3 ml  59.78 ml/m²    Aortic Valve Measurements:  AV Vmax:                2.2 m/s  AV Peak Gradient:       18.5 mmHg  AV Mean Gradient:       11.0 mmHg  AV VTI:                 41.0 cm  AV VTI Ratio:           0.64  AoV EOA, Contin:        2.67 cm²  AoV EOA, Contin i:      1.46 cm²/m²  AoV Dimensionless Index 0.64    Mitral Valve Measurements:     MV E Vmax: 1.0 m/s  MV A Vmax: 0.9 m/s  MV E/A:    1.1    Tricuspid Valve Measurements:     TR Vmax:          1.2 m/s  TR Peak Gradient: 5.3 mmHg  RA Pressure:      3 mmHg  PASP:             8 mmHg    ________________________________________________________________________________________  Electronically signed on 11/30/2024 at 1:57:15 PM by Kya Gonzalez MD         *** Final ***

## 2024-12-13 NOTE — PROGRESS NOTE ADULT - SUBJECTIVE AND OBJECTIVE BOX
Patient is a 69y old  Male who presents with a chief complaint of AMS (12 Dec 2024 18:51)       INTERVAL HPI/OVERNIGHT EVENTS: Patient seen and examined at bedside. Awake, alert, answers some of the questions. No distress.     MEDICATIONS  (STANDING):  amLODIPine   Tablet 10 milliGRAM(s) Oral daily  ascorbic acid 500 milliGRAM(s) Oral two times a day  azaTHIOprine 50 milliGRAM(s) Oral two times a day  buPROPion XL (24-Hour) . 300 milliGRAM(s) Oral daily  carvedilol 12.5 milliGRAM(s) Oral every 12 hours  cloNIDine 0.1 milliGRAM(s) Oral three times a day  dextrose 5%. 1000 milliLiter(s) (50 mL/Hr) IV Continuous <Continuous>  dextrose 5%. 1000 milliLiter(s) (100 mL/Hr) IV Continuous <Continuous>  dextrose 50% Injectable 25 Gram(s) IV Push once  dextrose 50% Injectable 12.5 Gram(s) IV Push once  dextrose 50% Injectable 25 Gram(s) IV Push once  escitalopram 10 milliGRAM(s) Oral <User Schedule>  ferrous    sulfate 325 milliGRAM(s) Oral two times a day  glucagon  Injectable 1 milliGRAM(s) IntraMuscular once  hydrALAZINE 100 milliGRAM(s) Oral three times a day  insulin glargine Injectable (LANTUS) 24 Unit(s) SubCutaneous at bedtime  insulin lispro (ADMELOG) corrective regimen sliding scale   SubCutaneous three times a day before meals  insulin lispro Injectable (ADMELOG) 5 Unit(s) SubCutaneous three times a day before meals  levothyroxine 88 MICROGram(s) Oral <User Schedule>  lisinopril 40 milliGRAM(s) Oral daily  mineral oil enema 133 milliLiter(s) Rectal once  multivitamin/minerals/iron Oral Solution (CENTRUM) 15 milliLiter(s) Oral daily  pantoprazole    Tablet 40 milliGRAM(s) Oral two times a day  polyethylene glycol 3350 17 Gram(s) Oral daily  predniSONE   Tablet 20 milliGRAM(s) Oral daily  pyridoxine 50 milliGRAM(s) Oral daily  rosuvastatin 10 milliGRAM(s) Oral at bedtime  senna 2 Tablet(s) Oral at bedtime  sodium chloride 0.9%. 1000 milliLiter(s) (50 mL/Hr) IV Continuous <Continuous>  sodium zirconium cyclosilicate 5 Gram(s) Oral <User Schedule>  tacrolimus 1 milliGRAM(s) Oral at bedtime  tacrolimus 2 milliGRAM(s) Oral daily    MEDICATIONS  (PRN):  aluminum hydroxide/magnesium hydroxide/simethicone Suspension 30 milliLiter(s) Oral every 4 hours PRN Dyspepsia  dextrose Oral Gel 15 Gram(s) Oral once PRN Blood Glucose LESS THAN 70 milliGRAM(s)/deciliter  hydrALAZINE Injectable 10 milliGRAM(s) IV Push every 8 hours PRN SBP >180 and HR 60-70bpm  melatonin 3 milliGRAM(s) Oral at bedtime PRN Insomnia  metoprolol tartrate Injectable 5 milliGRAM(s) IV Push every 6 hours PRN SBP >170  ondansetron Injectable 4 milliGRAM(s) IV Push every 8 hours PRN Nausea and/or Vomiting      Allergies    No Known Allergies    Intolerances        REVIEW OF SYSTEMS:  Per HPI. All other ROS noted Negative   Vital Signs Last 24 Hrs  T(C): 36.6 (13 Dec 2024 05:04), Max: 36.9 (12 Dec 2024 12:21)  T(F): 97.8 (13 Dec 2024 05:04), Max: 98.5 (12 Dec 2024 12:21)  HR: 64 (13 Dec 2024 05:04) (60 - 64)  BP: 118/69 (13 Dec 2024 05:04) (100/58 - 147/60)  BP(mean): 61 (12 Dec 2024 20:38) (61 - 61)  RR: 18 (13 Dec 2024 05:04) (18 - 19)  SpO2: 97% (13 Dec 2024 05:04) (96% - 98%)    Parameters below as of 13 Dec 2024 05:04  Patient On (Oxygen Delivery Method): room air        PHYSICAL EXAM:  GENERAL: NAD,  confused, calm   HEAD:  Atraumatic, Normocephalic  EYES: EOMI, conjunctiva and sclera clear  ENMT: Moist mucous membranes  NECK: Supple, No JVD  NERVOUS SYSTEM:  Alert   CHEST/LUNG: Clear to auscultation bilaterally; No rales, rhonchi, wheezing  HEART: Regular rate and rhythm  ABDOMEN: Soft, Nontender, Nondistended; Bowel sounds present  EXTREMITIES:   No clubbing, cyanosis    LABS:                        8.1    3.22  )-----------( 431      ( 13 Dec 2024 06:45 )             24.8     13 Dec 2024 06:45    132    |  100    |  43     ----------------------------<  440    5.0     |  27     |  1.00     Ca    10.3       13 Dec 2024 06:45    TPro  6.5    /  Alb  2.1    /  TBili  0.5    /  DBili  x      /  AST  36     /  ALT  43     /  AlkPhos  116    13 Dec 2024 06:45      CAPILLARY BLOOD GLUCOSE      POCT Blood Glucose.: 452 mg/dL (13 Dec 2024 07:51)  POCT Blood Glucose.: 445 mg/dL (13 Dec 2024 07:50)  POCT Blood Glucose.: 345 mg/dL (12 Dec 2024 16:56)  POCT Blood Glucose.: 286 mg/dL (12 Dec 2024 11:42)    BLOOD CULTURE    RADIOLOGY & ADDITIONAL TESTS:    Imaging Personally Reviewed:  [ ] YES     Consultant(s) Notes Reviewed:      Care Discussed with Consultants/Other Providers:

## 2024-12-13 NOTE — PROVIDER CONTACT NOTE (CRITICAL VALUE NOTIFICATION) - PERSON GIVING RESULT:
guillermo Foster
Lab- Haylie Foster
Natali Parsons
Nathanael Matthews from the Lab
Lab
RN
edvin rashid
Lab  Shanta
Lab
Lab dandre

## 2024-12-13 NOTE — PROGRESS NOTE ADULT - SUBJECTIVE AND OBJECTIVE BOX
[facility], Division of Infectious Diseases  WANG Mccoy Y. Patel, S. Shah, G. [facility]  [phone]    Name: JAN CALVIN  Age: 69y  Gender: Male  MRN: [id number]    Interval History:  No acute overnight events.   Notes reviewed    Antibiotics:      Medications:  aluminum hydroxide/magnesium hydroxide/simethicone Suspension 30 milliLiter(s) Oral every 4 hours PRN  amLODIPine   Tablet 10 milliGRAM(s) Oral daily  ascorbic acid 500 milliGRAM(s) Oral two times a day  azaTHIOprine 50 milliGRAM(s) Oral two times a day  buPROPion XL (24-Hour) . 300 milliGRAM(s) Oral daily  carvedilol 12.5 milliGRAM(s) Oral every 12 hours  cloNIDine 0.1 milliGRAM(s) Oral three times a day  dextrose 5%. 1000 milliLiter(s) IV Continuous <Continuous>  dextrose 5%. 1000 milliLiter(s) IV Continuous <Continuous>  dextrose 50% Injectable 25 Gram(s) IV Push once  dextrose 50% Injectable 12.5 Gram(s) IV Push once  dextrose 50% Injectable 25 Gram(s) IV Push once  dextrose Oral Gel 15 Gram(s) Oral once PRN  escitalopram 10 milliGRAM(s) Oral <User Schedule>  ferrous    sulfate 325 milliGRAM(s) Oral two times a day  glucagon  Injectable 1 milliGRAM(s) IntraMuscular once  hydrALAZINE 100 milliGRAM(s) Oral three times a day  hydrALAZINE Injectable 10 milliGRAM(s) IV Push every 8 hours PRN  insulin glargine Injectable (LANTUS) 24 Unit(s) SubCutaneous at bedtime  insulin lispro (ADMELOG) corrective regimen sliding scale   SubCutaneous three times a day before meals  insulin lispro Injectable (ADMELOG) 7 Unit(s) SubCutaneous three times a day before meals  levothyroxine 88 MICROGram(s) Oral <User Schedule>  lisinopril 40 milliGRAM(s) Oral daily  melatonin 3 milliGRAM(s) Oral at bedtime PRN  metoprolol tartrate Injectable 5 milliGRAM(s) IV Push every 6 hours PRN  mineral oil enema 133 milliLiter(s) Rectal once  multivitamin/minerals/iron Oral Solution (CENTRUM) 15 milliLiter(s) Oral daily  ondansetron Injectable 4 milliGRAM(s) IV Push every 8 hours PRN  pantoprazole    Tablet 40 milliGRAM(s) Oral two times a day  polyethylene glycol 3350 17 Gram(s) Oral daily  predniSONE   Tablet 20 milliGRAM(s) Oral daily  pyridoxine 50 milliGRAM(s) Oral daily  rosuvastatin 10 milliGRAM(s) Oral at bedtime  senna 2 Tablet(s) Oral at bedtime  sodium zirconium cyclosilicate 5 Gram(s) Oral <User Schedule>  tacrolimus 1 milliGRAM(s) Oral at bedtime  tacrolimus 2 milliGRAM(s) Oral daily      Review of Systems:  unable to obtain    Allergies: No Known Allergies    For details regarding the patient's past medical history, social history, family history, and other miscellaneous elements, please refer the initial infectious diseases consultation and/or the admitting history and physical examination for this admission.    Objective:  Vitals:   T(C): 36.6 (12-13-24 @ 05:04), Max: 36.7 (12-12-24 @ 18:59)  HR: 63 (12-13-24 @ 08:43) (61 - 64)  BP: 142/64 (12-13-24 @ 08:43) (100/58 - 144/72)  RR: 18 (12-13-24 @ 05:04) (18 - 19)  SpO2: 97% (12-13-24 @ 05:04) (97% - 97%)    Physical Examination:  General: no acute distress  HEENT: NC/AT, EOMI,  Cardio: S1, S2 heard, RRR, no murmurs  Resp: breath sounds heard bilaterally, no rales, wheezes or rhonchi  Abd: soft, NT, ND  Ext: no edema or cyanosis  Skin: warm, dry, no visible rash      Laboratory Studies:  CBC:                       8.1    3.22  )-----------( 431      ( 13 Dec 2024 06:45 )             24.8     CMP: 12-13    132[L]  |  100  |  43[H]  ----------------------------<  440[H]  5.0   |  27  |  1.00    Ca    10.3[H]      13 Dec 2024 06:45    TPro  6.5  /  Alb  2.1[L]  /  TBili  0.5  /  DBili  x   /  AST  36  /  ALT  43  /  AlkPhos  116  12-13    LIVER FUNCTIONS - ( 13 Dec 2024 06:45 )  Alb: 2.1 g/dL / Pro: 6.5 g/dL / ALK PHOS: 116 U/L / ALT: 43 U/L / AST: 36 U/L / GGT: x           Urinalysis Basic - ( 13 Dec 2024 06:45 )    Color: x / Appearance: x / SG: x / pH: x  Gluc: 440 mg/dL / Ketone: x  / Bili: x / Urobili: x   Blood: x / Protein: x / Nitrite: x   Leuk Esterase: x / RBC: x / WBC x   Sq Epi: x / Non Sq Epi: x / Bacteria: x        Microbiology: reviewed    Culture - Blood (collected 11-30-24 @ 07:05)  Source: .Blood BLOOD  Final Report (12-05-24 @ 13:00):    No growth at 5 days    Culture - Blood (collected 11-30-24 @ 07:00)  Source: .Blood BLOOD  Final Report (12-05-24 @ 13:00):    No growth at 5 days          Radiology: reviewed

## 2024-12-13 NOTE — CONSULT NOTE ADULT - CONSULT REASON
Sacral infection
Pericardial effusion
Abnormal imaging  Anemia
CKD, h/o Kidney transplant
Sacral lesion, possible path fracture
AMS
dm2 uncontrolled
evaluation of anemia D64.89
69y A1C with Estimated Average Glucose Result: 7.4 % (11-30-24 @ 06:40)   diabetes mellitus uncontrolled type 2

## 2024-12-13 NOTE — CONSULT NOTE ADULT - ASSESSMENT
Physical Exam:   Vital Signs Last 24 Hrs  T(C): 36.6 (13 Dec 2024 05:04), Max: 36.7 (12 Dec 2024 18:59)  T(F): 97.8 (13 Dec 2024 05:04), Max: 98.1 (12 Dec 2024 18:59)  HR: 63 (13 Dec 2024 08:43) (61 - 64)  BP: 142/64 (13 Dec 2024 08:43) (100/58 - 144/72)  BP(mean): 61 (12 Dec 2024 20:38) (61 - 61)  RR: 18 (13 Dec 2024 05:04) (18 - 19)  SpO2: 97% (13 Dec 2024 05:04) (97% - 97%)    Parameters below as of 13 Dec 2024 05:04  Patient On (Oxygen Delivery Method): room air       CAPILLARY BLOOD GLUCOSE      POCT Blood Glucose.: 396 mg/dL (13 Dec 2024 11:51)  POCT Blood Glucose.: 378 mg/dL (13 Dec 2024 11:50)  POCT Blood Glucose.: 452 mg/dL (13 Dec 2024 07:51)  POCT Blood Glucose.: 445 mg/dL (13 Dec 2024 07:50)  POCT Blood Glucose.: 345 mg/dL (12 Dec 2024 16:56)        DIET: CC  >50%

## 2024-12-13 NOTE — CONSULT NOTE ADULT - SUBJECTIVE AND OBJECTIVE BOX
Patient is a 69y old  Male who presents with a chief complaint of AMS (13 Dec 2024 13:49)    from [facility] rehab, a&Ox2-3 but poor historian  Type: 2 DM DX 13 years,  known complications hx kidney tx, Endocrine Dr Zoya Romero a1c 7.4%, rx insulin glargine 35 units @ HS, novolog 10-15 units TIDAC, has dexcom cgm, reports readings 120-200. reviewed bg trends, persistent hyperglycemia. will increase basal insulin, pending liver bx on monday, verbal education provided, reports no needs  diabetes education provided- A1c measure and BG targets  fasting, <180 2 hours postmeal. , inhospital BGM frequency and insulin administration, s/s of hyperglycemia/hypoglycemia and management, glycemic control and preventing complications, consistent carb diet, balanced plate method, consistent meal planning. sick day management, provider f/u  Hx ASCVD, CKD, HF    HPI:  Patient is a 68yo M, PMH DM, HTN, HLD, h/o kidney transplant, hypothyroidism, presents to ED from [facility] for AMS. Patient is lethargic and unable to provide history at this time. Information obtained from transfer record and chart.   Patient received Zosyn and Azithromycin 11/12-11/18 for PNA per transfer record.  UA positive for UTI  pan scan revealed:  Small left pleural effusion with small loculated components  Small to moderate pericardial effusion.  Approximately 5.5 cm low-density lesion anterior right hepatic lobe.  Atrophic right kidney.  Absent left kidney.  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  Colonic fecal retention  Stercoral proctitis.  Patchy sclerosis and osteolysis throughout the bilateral sacrum with   pathologic fractures.There is soft tissue with stranding extending   throughout the presacral and posterior extraperitoneal pelvic soft   tissues.  There are a few bubbles of air/gas at the right sacral pathologic   fracture, findings suggestive of   infection/osteomyelitis.     (29 Nov 2024 12:47)      PAST MEDICAL & SURGICAL HISTORY:  Diabetes Mellitus Type II  Insulin pump (2010)      GERD (gastroesophageal reflux disease)      Depression      Hypothyroidism      Hyperlipidemia      Kidney transplanted  2012      Hypertension      ANGELIKA (obstructive sleep apnea)      Peripheral neuropathy      Shoulder fracture  Left.      History of colonoscopy      S/P kidney transplant  2012      S/P cholecystectomy      Retroperitoneal fibrosis  s/p surgery      AV fistula  Right arm      S/P right cataract extraction      S/P left cataract extraction      H/O unilateral nephrectomy  Left    Allergies    No Known Allergies    Intolerances        MEDICATIONS  (STANDING):  amLODIPine   Tablet 10 milliGRAM(s) Oral daily  ascorbic acid 500 milliGRAM(s) Oral two times a day  azaTHIOprine 50 milliGRAM(s) Oral two times a day  buPROPion XL (24-Hour) . 300 milliGRAM(s) Oral daily  carvedilol 12.5 milliGRAM(s) Oral every 12 hours  cloNIDine 0.1 milliGRAM(s) Oral three times a day  dextrose 5%. 1000 milliLiter(s) (50 mL/Hr) IV Continuous <Continuous>  dextrose 5%. 1000 milliLiter(s) (100 mL/Hr) IV Continuous <Continuous>  dextrose 50% Injectable 25 Gram(s) IV Push once  dextrose 50% Injectable 12.5 Gram(s) IV Push once  dextrose 50% Injectable 25 Gram(s) IV Push once  escitalopram 10 milliGRAM(s) Oral <User Schedule>  ferrous    sulfate 325 milliGRAM(s) Oral two times a day  glucagon  Injectable 1 milliGRAM(s) IntraMuscular once  hydrALAZINE 100 milliGRAM(s) Oral three times a day  insulin glargine Injectable (LANTUS) 24 Unit(s) SubCutaneous at bedtime  insulin lispro (ADMELOG) corrective regimen sliding scale   SubCutaneous three times a day before meals  insulin lispro Injectable (ADMELOG) 7 Unit(s) SubCutaneous three times a day before meals  levothyroxine 88 MICROGram(s) Oral <User Schedule>  lisinopril 40 milliGRAM(s) Oral daily  mineral oil enema 133 milliLiter(s) Rectal once  multivitamin/minerals/iron Oral Solution (CENTRUM) 15 milliLiter(s) Oral daily  pantoprazole    Tablet 40 milliGRAM(s) Oral two times a day  polyethylene glycol 3350 17 Gram(s) Oral daily  predniSONE   Tablet 20 milliGRAM(s) Oral daily  pyridoxine 50 milliGRAM(s) Oral daily  rosuvastatin 10 milliGRAM(s) Oral at bedtime  senna 2 Tablet(s) Oral at bedtime  sodium zirconium cyclosilicate 5 Gram(s) Oral <User Schedule>  tacrolimus 2 milliGRAM(s) Oral daily  tacrolimus 1 milliGRAM(s) Oral at bedtime

## 2024-12-13 NOTE — PROGRESS NOTE ADULT - SUBJECTIVE AND OBJECTIVE BOX
[facility]                                                       Dr. Bruce, Dr. Prabhakar, Dr. Cabrales, Dr. Zaragoza, Dr. Bingham, Dr. Loya                                      [facility], [facility], [address]                                                 [address] [address]                                      Ph: [phone]  Fax: [phone]                                         Ph: [phone]  Fax: [phone]      Patient is a 69y old  Male who presents with a chief complaint of AMS (01 Dec 2024 11:19)  Patient seen in follow up for CKD, h/o Kidney transplant.        PAST MEDICAL HISTORY:  Diabetes Mellitus Type II    ESRD on Dialysis    GERD (gastroesophageal reflux disease)    Depression    Hypothyroidism    Hyperlipidemia    Kidney transplanted    Hypertension    ANGELIKA (obstructive sleep apnea)    Peripheral neuropathy    Shoulder fracture        MEDICATIONS  (STANDING):  amLODIPine   Tablet 10 milliGRAM(s) Oral daily  ascorbic acid 500 milliGRAM(s) Oral two times a day  azaTHIOprine 50 milliGRAM(s) Oral two times a day  buPROPion XL (24-Hour) . 300 milliGRAM(s) Oral daily  carvedilol 12.5 milliGRAM(s) Oral every 12 hours  cloNIDine 0.1 milliGRAM(s) Oral three times a day  dextrose 5%. 1000 milliLiter(s) (50 mL/Hr) IV Continuous <Continuous>  dextrose 5%. 1000 milliLiter(s) (100 mL/Hr) IV Continuous <Continuous>  dextrose 50% Injectable 25 Gram(s) IV Push once  dextrose 50% Injectable 12.5 Gram(s) IV Push once  dextrose 50% Injectable 25 Gram(s) IV Push once  escitalopram 10 milliGRAM(s) Oral <User Schedule>  ferrous    sulfate 325 milliGRAM(s) Oral two times a day  glucagon  Injectable 1 milliGRAM(s) IntraMuscular once  hydrALAZINE 100 milliGRAM(s) Oral three times a day  insulin glargine Injectable (LANTUS) 24 Unit(s) SubCutaneous at bedtime  insulin lispro (ADMELOG) corrective regimen sliding scale   SubCutaneous three times a day before meals  insulin lispro Injectable (ADMELOG) 7 Unit(s) SubCutaneous three times a day before meals  levothyroxine 88 MICROGram(s) Oral <User Schedule>  lisinopril 40 milliGRAM(s) Oral daily  mineral oil enema 133 milliLiter(s) Rectal once  multivitamin/minerals/iron Oral Solution (CENTRUM) 15 milliLiter(s) Oral daily  pantoprazole    Tablet 40 milliGRAM(s) Oral two times a day  polyethylene glycol 3350 17 Gram(s) Oral daily  predniSONE   Tablet 20 milliGRAM(s) Oral daily  pyridoxine 50 milliGRAM(s) Oral daily  rosuvastatin 10 milliGRAM(s) Oral at bedtime  senna 2 Tablet(s) Oral at bedtime  sodium zirconium cyclosilicate 5 Gram(s) Oral <User Schedule>  tacrolimus 2 milliGRAM(s) Oral daily  tacrolimus 1 milliGRAM(s) Oral at bedtime    MEDICATIONS  (PRN):  aluminum hydroxide/magnesium hydroxide/simethicone Suspension 30 milliLiter(s) Oral every 4 hours PRN Dyspepsia  dextrose Oral Gel 15 Gram(s) Oral once PRN Blood Glucose LESS THAN 70 milliGRAM(s)/deciliter  hydrALAZINE Injectable 10 milliGRAM(s) IV Push every 8 hours PRN SBP >180 and HR 60-70bpm  melatonin 3 milliGRAM(s) Oral at bedtime PRN Insomnia  metoprolol tartrate Injectable 5 milliGRAM(s) IV Push every 6 hours PRN SBP >170  ondansetron Injectable 4 milliGRAM(s) IV Push every 8 hours PRN Nausea and/or Vomiting    T(C): 36.6 (12-13-24 @ 05:04), Max: 37 (12-11-24 @ 20:52)  HR: 63 (12-13-24 @ 08:43) (60 - 68)  BP: 142/64 (12-13-24 @ 08:43) (100/58 - 182/67)  RR: 18 (12-13-24 @ 05:04)  SpO2: 97% (12-13-24 @ 05:04)  Wt(kg): --  I&O's Detail    12 Dec 2024 07:01  -  13 Dec 2024 07:00  --------------------------------------------------------  IN:    Lactated Ringers: 100 mL    sodium chloride 0.9%: 250 mL  Total IN: 350 mL    OUT:    Voided (mL): 1700 mL  Total OUT: 1700 mL    Total NET: -1350 mL                        PHYSICAL EXAM:  General: No distress  Respiratory: b/l air entry  Cardiovascular: S1 S2  Gastrointestinal: soft  Extremities:  no edema          LABORATORY:                        8.1    3.22  )-----------( 431      ( 13 Dec 2024 06:45 )             24.8     12-13    132[L]  |  100  |  43[H]  ----------------------------<  440[H]  5.0   |  27  |  1.00    Ca    10.3[H]      13 Dec 2024 06:45    TPro  6.5  /  Alb  2.1[L]  /  TBili  0.5  /  DBili  x   /  AST  36  /  ALT  43  /  AlkPhos  116  12-13    Sodium: 132 mmol/L (12-13 @ 06:45)  Sodium: 134 mmol/L (12-12 @ 10:55)  Sodium: 133 mmol/L (12-12 @ 06:25)    Potassium: 5.0 mmol/L (12-13 @ 06:45)  Potassium: 5.0 mmol/L (12-12 @ 13:00)  Potassium: 5.2 mmol/L (12-12 @ 10:55)  Potassium: 5.6 mmol/L (12-12 @ 06:25)    Hemoglobin: 8.1 g/dL (12-13 @ 06:45)  Hemoglobin: 8.8 g/dL (12-12 @ 06:25)  Hemoglobin: 8.4 g/dL (12-11 @ 10:30)    Creatinine, Serum 1.00 (12-13 @ 06:45)  Creatinine, Serum 0.82 (12-12 @ 10:55)  Creatinine, Serum 0.91 (12-12 @ 06:25)  Creatinine, Serum 0.84 (12-11 @ 10:30)        LIVER FUNCTIONS - ( 13 Dec 2024 06:45 )  Alb: 2.1 g/dL / Pro: 6.5 g/dL / ALK PHOS: 116 U/L / ALT: 43 U/L / AST: 36 U/L / GGT: x           Urinalysis Basic - ( 13 Dec 2024 06:45 )    Color: x / Appearance: x / SG: x / pH: x  Gluc: 440 mg/dL / Ketone: x  / Bili: x / Urobili: x   Blood: x / Protein: x / Nitrite: x   Leuk Esterase: x / RBC: x / WBC x   Sq Epi: x / Non Sq Epi: x / Bacteria: x

## 2024-12-13 NOTE — PROGRESS NOTE ADULT - ASSESSMENT
68yo M, PMH DM, HTN, HLD, h/o kidney transplant, hypothyroidism, presents to ED from [facility] for AMS, found to be febrile with positive UA. 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 Cardiology called for pericardial effusion.     Uncontrolled HTN/Pericardial Effusion  - CT chest: Small left pleural effusion with small loculated components and small to moderate pericardial effusion  - TTE: EF 60%, trace pericardial effusion adjacent to the posterior LV.  No tamponade physiology.  No significant valvular disease  - No evidence of any meaningful volume overload     - EKG with nonspecific TWI anteriorly, repeat unchanged  - No evidence of any active ischemia   - Continue statin   - Continue to hold ASA in setting of persistent anemia, though, neg FOBT.  s/p PRBC's for Hgb of 6.5 (12/10).  There appears to be no clear indication for it  - Continue to trend and transfuse per primary   - Heme and GI following for anemia.  With liver mass on MRI, planned for liver Bx via IR    - BP's now significantly better at systolic 140's  - Continue Norvasc 10mg and Lisinopril 40 mg daily  - Continue Clonidine 0.1 mg PO TID and Coreg 12.5mg BID   - Continue Hydralazine but would reduce to 50 mg q8H if SBP stable   - Continue to monitor routine hemodynamics     - Ortho following for pathological sacral Fx with possible OM.   - No acute orthopedic surgical intervention at this time.    - Monitor and replete Lytes. Keep K > 4 and Mg > 2  - Will continue to follow.    Oralia Mendoza DNP, NP-C, AGACNP-C  Cardiology   Call TEAMS        70yo M, PMH DM, HTN, HLD, h/o kidney transplant, hypothyroidism, presents to ED from [facility] for AMS, found to be febrile with positive UA. 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 Cardiology called for pericardial effusion.     Uncontrolled HTN/Pericardial Effusion  - CT chest: Small left pleural effusion with small loculated components and small to moderate pericardial effusion  - TTE: EF 60%, trace pericardial effusion adjacent to the posterior LV.  No tamponade physiology.  No significant valvular disease  - No evidence of any meaningful volume overload     - EKG with nonspecific TWI anteriorly, repeat unchanged  - No evidence of any active ischemia   - Continue statin   - Continue to hold ASA in setting of persistent anemia, though, neg FOBT.  s/p PRBC's for Hgb of 6.5 (12/10).  There appears to be no clear indication for it  - Continue to trend and transfuse per primary   - Heme and GI following for anemia.  With liver mass on MRI, planned for liver Bx via IR on Monday, 12/16.  No contraindication from cardiac standpoint given electrolytes WNL.  Procedure postponed from 3/12, due to hyperkalemia requiring Lokelma    - BP's now significantly better at systolic 140's  - Continue Norvasc 10mg and Lisinopril 40 mg daily  - Continue Clonidine 0.1 mg PO TID and Coreg 12.5mg BID   - Continue Hydralazine but would reduce to 50 mg q8H if SBP stable   - Continue to monitor routine hemodynamics     - Ortho following for pathological sacral Fx with possible OM.   - No acute orthopedic surgical intervention at this time.    - Monitor and replete Lytes. Keep K > 4 and Mg > 2  - Will continue to follow.    Oralia Mendoza DNP, NP-C, AGACNP-C  Cardiology   Call TEAMS

## 2024-12-14 LAB
ANION GAP SERPL CALC-SCNC: 5 MMOL/L — SIGNIFICANT CHANGE UP (ref 5–17)
BUN SERPL-MCNC: 41 MG/DL — HIGH (ref 7–23)
CALCIUM SERPL-MCNC: 11.2 MG/DL — HIGH (ref 8.5–10.1)
CHLORIDE SERPL-SCNC: 100 MMOL/L — SIGNIFICANT CHANGE UP (ref 96–108)
CO2 SERPL-SCNC: 27 MMOL/L — SIGNIFICANT CHANGE UP (ref 22–31)
CREAT SERPL-MCNC: 1.1 MG/DL — SIGNIFICANT CHANGE UP (ref 0.5–1.3)
EGFR: 74 ML/MIN/1.73M2 — SIGNIFICANT CHANGE UP
GLUCOSE SERPL-MCNC: 316 MG/DL — HIGH (ref 70–99)
HCT VFR BLD CALC: 25.9 % — LOW (ref 39–50)
HGB BLD-MCNC: 8.4 G/DL — LOW (ref 13–17)
MCHC RBC-ENTMCNC: 29.6 PG — SIGNIFICANT CHANGE UP (ref 27–34)
MCHC RBC-ENTMCNC: 32.4 G/DL — SIGNIFICANT CHANGE UP (ref 32–36)
MCV RBC AUTO: 91.2 FL — SIGNIFICANT CHANGE UP (ref 80–100)
NRBC # BLD: 0 /100 WBCS — SIGNIFICANT CHANGE UP (ref 0–0)
PLATELET # BLD AUTO: 442 K/UL — HIGH (ref 150–400)
POTASSIUM SERPL-MCNC: 4.7 MMOL/L — SIGNIFICANT CHANGE UP (ref 3.5–5.3)
POTASSIUM SERPL-SCNC: 4.7 MMOL/L — SIGNIFICANT CHANGE UP (ref 3.5–5.3)
RBC # BLD: 2.84 M/UL — LOW (ref 4.2–5.8)
RBC # FLD: 17.9 % — HIGH (ref 10.3–14.5)
SODIUM SERPL-SCNC: 132 MMOL/L — LOW (ref 135–145)
WBC # BLD: 4.37 K/UL — SIGNIFICANT CHANGE UP (ref 3.8–10.5)
WBC # FLD AUTO: 4.37 K/UL — SIGNIFICANT CHANGE UP (ref 3.8–10.5)

## 2024-12-14 PROCEDURE — 99232 SBSQ HOSP IP/OBS MODERATE 35: CPT

## 2024-12-14 RX ORDER — INSULIN GLARGINE 100 [IU]/ML
30 INJECTION, SOLUTION SUBCUTANEOUS AT BEDTIME
Refills: 0 | Status: DISCONTINUED | OUTPATIENT
Start: 2024-12-14 | End: 2024-12-18

## 2024-12-14 RX ADMIN — CLONIDINE HYDROCHLORIDE 0.1 MILLIGRAM(S): 0.3 TABLET ORAL at 14:45

## 2024-12-14 RX ADMIN — TACROLIMUS 1 MILLIGRAM(S): 5 CAPSULE ORAL at 23:17

## 2024-12-14 RX ADMIN — PREDNISONE 20 MILLIGRAM(S): 20 TABLET ORAL at 05:48

## 2024-12-14 RX ADMIN — Medication 325 MILLIGRAM(S): at 17:33

## 2024-12-14 RX ADMIN — CARVEDILOL 12.5 MILLIGRAM(S): 25 TABLET, FILM COATED ORAL at 05:45

## 2024-12-14 RX ADMIN — HYDRALAZINE HYDROCHLORIDE 100 MILLIGRAM(S): 10 TABLET ORAL at 14:44

## 2024-12-14 RX ADMIN — Medication 8: at 08:03

## 2024-12-14 RX ADMIN — Medication 7 UNIT(S): at 08:04

## 2024-12-14 RX ADMIN — CLONIDINE HYDROCHLORIDE 0.1 MILLIGRAM(S): 0.3 TABLET ORAL at 05:44

## 2024-12-14 RX ADMIN — TACROLIMUS 2 MILLIGRAM(S): 5 CAPSULE ORAL at 12:16

## 2024-12-14 RX ADMIN — CLONIDINE HYDROCHLORIDE 0.1 MILLIGRAM(S): 0.3 TABLET ORAL at 23:17

## 2024-12-14 RX ADMIN — Medication 50 MILLIGRAM(S): at 12:16

## 2024-12-14 RX ADMIN — PANTOPRAZOLE SODIUM 40 MILLIGRAM(S): 40 TABLET, DELAYED RELEASE ORAL at 17:32

## 2024-12-14 RX ADMIN — ROSUVASTATIN CALCIUM 10 MILLIGRAM(S): 5 TABLET, FILM COATED ORAL at 23:17

## 2024-12-14 RX ADMIN — LISINOPRIL 40 MILLIGRAM(S): 20 TABLET ORAL at 05:48

## 2024-12-14 RX ADMIN — Medication 500 MILLIGRAM(S): at 17:33

## 2024-12-14 RX ADMIN — POLYETHYLENE GLYCOL 3350 17 GRAM(S): 17 POWDER, FOR SOLUTION ORAL at 12:17

## 2024-12-14 RX ADMIN — Medication 7 UNIT(S): at 12:21

## 2024-12-14 RX ADMIN — Medication 2 TABLET(S): at 23:16

## 2024-12-14 RX ADMIN — Medication 15 MILLILITER(S): at 12:16

## 2024-12-14 RX ADMIN — CARVEDILOL 12.5 MILLIGRAM(S): 25 TABLET, FILM COATED ORAL at 17:33

## 2024-12-14 RX ADMIN — HYDRALAZINE HYDROCHLORIDE 100 MILLIGRAM(S): 10 TABLET ORAL at 23:16

## 2024-12-14 RX ADMIN — Medication 7 UNIT(S): at 17:27

## 2024-12-14 RX ADMIN — ESCITALOPRAM OXALATE 10 MILLIGRAM(S): 10 TABLET, FILM COATED ORAL at 12:23

## 2024-12-14 RX ADMIN — AMLODIPINE BESYLATE 10 MILLIGRAM(S): 10 TABLET ORAL at 05:44

## 2024-12-14 RX ADMIN — Medication 6: at 12:20

## 2024-12-14 RX ADMIN — ESCITALOPRAM OXALATE 10 MILLIGRAM(S): 10 TABLET, FILM COATED ORAL at 17:36

## 2024-12-14 RX ADMIN — Medication 6: at 17:27

## 2024-12-14 RX ADMIN — PANTOPRAZOLE SODIUM 40 MILLIGRAM(S): 40 TABLET, DELAYED RELEASE ORAL at 05:44

## 2024-12-14 RX ADMIN — Medication 500 MILLIGRAM(S): at 06:20

## 2024-12-14 RX ADMIN — INSULIN GLARGINE 30 UNIT(S): 100 INJECTION, SOLUTION SUBCUTANEOUS at 23:17

## 2024-12-14 RX ADMIN — Medication 300 MILLIGRAM(S): at 12:17

## 2024-12-14 RX ADMIN — AZATHIOPRINE 50 MILLIGRAM(S): 100 TABLET ORAL at 17:33

## 2024-12-14 RX ADMIN — Medication 325 MILLIGRAM(S): at 05:44

## 2024-12-14 RX ADMIN — ACETAMINOPHEN, DIPHENHYDRAMINE HCL, PHENYLEPHRINE HCL 3 MILLIGRAM(S): 325; 25; 5 TABLET ORAL at 23:16

## 2024-12-14 RX ADMIN — Medication 88 MICROGRAM(S): at 05:54

## 2024-12-14 RX ADMIN — HYDRALAZINE HYDROCHLORIDE 100 MILLIGRAM(S): 10 TABLET ORAL at 05:47

## 2024-12-14 RX ADMIN — AZATHIOPRINE 50 MILLIGRAM(S): 100 TABLET ORAL at 05:45

## 2024-12-14 RX ADMIN — ESCITALOPRAM OXALATE 10 MILLIGRAM(S): 10 TABLET, FILM COATED ORAL at 05:54

## 2024-12-14 NOTE — PROGRESS NOTE ADULT - SUBJECTIVE AND OBJECTIVE BOX
Patient is a 69y old  Male who presents with a chief complaint of AMS (14 Dec 2024 12:09)      HPI:  Patient is a 70yo M, PMH DM, HTN, HLD, h/o kidney transplant, hypothyroidism, presents to ED from [facility] for AMS. Patient is lethargic and unable to provide history at this time. Information obtained from transfer record and chart.   Patient received Zosyn and Azithromycin 11/12-11/18 for PNA per transfer record.    Interval events: No events overnight. Patient has no concerns or complaints at this time. Patient denies CP, SOB, cough. Patient denies NVD.         (29 Nov 2024 12:47)      PAST MEDICAL & SURGICAL HISTORY:  Diabetes Mellitus Type II  Insulin pump (2010)      GERD (gastroesophageal reflux disease)      Depression      Hypothyroidism      Hyperlipidemia      Kidney transplanted  2012      Hypertension      ANGELIKA (obstructive sleep apnea)      Peripheral neuropathy      Shoulder fracture  Left.      History of colonoscopy      S/P kidney transplant  2012      S/P cholecystectomy      Retroperitoneal fibrosis  s/p surgery      AV fistula  Right arm      S/P right cataract extraction      S/P left cataract extraction      H/O unilateral nephrectomy  Left                MEDICATIONS  (STANDING):  amLODIPine   Tablet 10 milliGRAM(s) Oral daily  ascorbic acid 500 milliGRAM(s) Oral two times a day  azaTHIOprine 50 milliGRAM(s) Oral two times a day  buPROPion XL (24-Hour) . 300 milliGRAM(s) Oral daily  carvedilol 12.5 milliGRAM(s) Oral every 12 hours  cloNIDine 0.1 milliGRAM(s) Oral three times a day  dextrose 5%. 1000 milliLiter(s) (50 mL/Hr) IV Continuous <Continuous>  dextrose 5%. 1000 milliLiter(s) (100 mL/Hr) IV Continuous <Continuous>  dextrose 50% Injectable 25 Gram(s) IV Push once  dextrose 50% Injectable 12.5 Gram(s) IV Push once  dextrose 50% Injectable 25 Gram(s) IV Push once  escitalopram 10 milliGRAM(s) Oral <User Schedule>  ferrous    sulfate 325 milliGRAM(s) Oral two times a day  glucagon  Injectable 1 milliGRAM(s) IntraMuscular once  hydrALAZINE 100 milliGRAM(s) Oral three times a day  insulin glargine Injectable (LANTUS) 24 Unit(s) SubCutaneous at bedtime  insulin lispro (ADMELOG) corrective regimen sliding scale   SubCutaneous three times a day before meals  insulin lispro Injectable (ADMELOG) 7 Unit(s) SubCutaneous three times a day before meals  levothyroxine 88 MICROGram(s) Oral <User Schedule>  lisinopril 40 milliGRAM(s) Oral daily  mineral oil enema 133 milliLiter(s) Rectal once  multivitamin/minerals/iron Oral Solution (CENTRUM) 15 milliLiter(s) Oral daily  pantoprazole    Tablet 40 milliGRAM(s) Oral two times a day  polyethylene glycol 3350 17 Gram(s) Oral daily  predniSONE   Tablet 20 milliGRAM(s) Oral daily  pyridoxine 50 milliGRAM(s) Oral daily  rosuvastatin 10 milliGRAM(s) Oral at bedtime  senna 2 Tablet(s) Oral at bedtime  sodium zirconium cyclosilicate 5 Gram(s) Oral <User Schedule>  tacrolimus 1 milliGRAM(s) Oral at bedtime  tacrolimus 2 milliGRAM(s) Oral daily    MEDICATIONS  (PRN):  aluminum hydroxide/magnesium hydroxide/simethicone Suspension 30 milliLiter(s) Oral every 4 hours PRN Dyspepsia  dextrose Oral Gel 15 Gram(s) Oral once PRN Blood Glucose LESS THAN 70 milliGRAM(s)/deciliter  hydrALAZINE Injectable 10 milliGRAM(s) IV Push every 8 hours PRN SBP >180 and HR 60-70bpm  melatonin 3 milliGRAM(s) Oral at bedtime PRN Insomnia  metoprolol tartrate Injectable 5 milliGRAM(s) IV Push every 6 hours PRN SBP >170  ondansetron Injectable 4 milliGRAM(s) IV Push every 8 hours PRN Nausea and/or Vomiting      FAMILY HISTORY:  MI (myocardial infarction)    Family history of obesity (Sibling)      Denies Family history of CAD or early MI      Constitutional: denies fever, chills  HEENT: denies blurry vision, difficulty hearing  Respiratory: denies SOB,   Cardiovascular: denies CP, palpitations, LE edema  Gastrointestinal: denies nausea, vomiting, abdominal pain  Neurologic: denies headache, weakness,  ROS negative except as noted above      SOCIAL HISTORY:    No tobacco, alcohol or recreational drug use    Vital Signs Last 24 Hrs  T(C): 36.7 (14 Dec 2024 05:09), Max: 36.7 (14 Dec 2024 05:09)  T(F): 98.1 (14 Dec 2024 05:09), Max: 98.1 (14 Dec 2024 05:09)  HR: 63 (14 Dec 2024 05:09) (60 - 63)  BP: 147/68 (14 Dec 2024 05:09) (94/56 - 156/65)  BP(mean): --  RR: 18 (14 Dec 2024 05:09) (18 - 18)  SpO2: 100% (14 Dec 2024 05:09) (96% - 100%)    Parameters below as of 14 Dec 2024 05:09  Patient On (Oxygen Delivery Method): room air        Physical Exam:  General: NAD  HEENT: moist mucous membranes   Neck: nontender  Neurology: A&Ox3  Respiratory: CTA B/L, No W/R/R  CV: RRR, +S1/S2, no murmurs, rubs or gallops  Abdominal: Soft, NT, ND +BSx4, no palpable masses  Extremities: No C/C/E, + peripheral pulses  MSK: Normal ROM, no joint erythema or warmth, no joint swelling   Skin: warm, dry, normal color        I&O's Detail    13 Dec 2024 07:01  -  14 Dec 2024 07:00  --------------------------------------------------------  IN:  Total IN: 0 mL    OUT:    Voided (mL): 500 mL  Total OUT: 500 mL    Total NET: -500 mL          LABS:                        8.4    4.37  )-----------( 442      ( 14 Dec 2024 07:01 )             25.9     12-14    132[L]  |  100  |  41[H]  ----------------------------<  316[H]  4.7   |  27  |  1.10    Ca    11.2[H]      14 Dec 2024 07:01    TPro  6.5  /  Alb  2.1[L]  /  TBili  0.5  /  DBili  x   /  AST  36  /  ALT  43  /  AlkPhos  116  12-13          Urinalysis Basic - ( 14 Dec 2024 07:01 )    Color: x / Appearance: x / SG: x / pH: x  Gluc: 316 mg/dL / Ketone: x  / Bili: x / Urobili: x   Blood: x / Protein: x / Nitrite: x   Leuk Esterase: x / RBC: x / WBC x   Sq Epi: x / Non Sq Epi: x / Bacteria: x      I&O's Summary    13 Dec 2024 07:01  -  14 Dec 2024 07:00  --------------------------------------------------------  IN: 0 mL / OUT: 500 mL / NET: -500 mL      12 Lead ECG:   Ventricular Rate 81 BPM    Atrial Rate 81 BPM    P-R Interval 148 ms    QRS Duration 104 ms    Q-T Interval 406 ms    QTC Calculation(Bazett) 471 ms    P Axis 44 degrees    R Axis 12 degrees    T Axis 53 degrees    Diagnosis Line Normal sinus rhythm  Normal ECG  When compared with ECG of 29-NOV-2024 07:11,  Nonspecific T wave abnormality, improved in Lateral leads  Confirmed by Kya Gonzalez ([id number]) on 12/1/2024 10:43:18 AM (12-01-24 @ 09:28)      TRANSTHORACIC ECHOCARDIOGRAM REPORT  ________________________________________________________________________________                                      _______       Pt. Name:       JAN CALVIN Study Date:    11/29/2024  MRN:            [id number]          YOB: 1955  Accession #:    [id number]         Age:           69 years  Account#:       [id number]        Gender:        M  Heart Rate:                       Height:        64.17 in (163.00 cm)  Rhythm:       Weight:        169.75 lb (77.00 kg)  Blood Pressure: 196/81 mmHg       BSA/BMI:       1.83 m² / 28.98 kg/m²  ________________________________________________________________________________________  Referring Physician:    [id number] Ale Ibrahim  Interpreting Physician: Kya Gonzalez MD  Primary Sonographer:    Butch Salazar    CPT:               ECHO TTE WO CON COMP W DOPP - 17250.m  Indication(s):     Cardiac murmur, unspecified - R01.1  Procedure:         Transthoracic echocardiogram with 2-D, M-mode and complete                     spectral and color flow Doppler.  Ordering Location: [facility]  Admission Status:  ED  ______________________________________________________________________________________     CONCLUSIONS:      1. Left ventricular cavity is normal in size. Left ventricular systolic function is normal with an ejection fraction of 60 % by Moreno's method of disks.   2. Trace pericardial effusion noted adjacent to the posterior left ventricle.   3. Normal right ventricular cavity size and normal right ventricular systolic function.   4. Aortic valve anatomy cannot be determined with normal systolic excursion. There is calcification of the aortic valve leaflets.   5. Structurally normal mitral valve with normalleaflet excursion.   6. Structurally normal tricuspid valve with normal leaflet excursion. Trace tricuspid regurgitation.   7. The inferior vena cava is normal in size measuring 1.36 cm in diameter, (normal <2.1cm) with normal inspiratory collapse (normal >50%) consistent with normal right atrial pressure (~3, range 0-5mmHg).  ________________________________________________________________________________________  FINDINGS:     Left Ventricle:  The left ventricular cavity is normal in size. Left ventricular systolic function is normal with a calculated ejection fraction of 60 % by the Moreno's biplane method of disks.     Right Ventricle:  The right ventricular cavity is normal in size and right ventricular systolic function is normal. Tricuspid annular plane systolic excursion (TAPSE) is 2.9 cm (normal >=1.7 cm).     Left Atrium:  The left atrium is normal in size with an indexed volume of 33.15 ml/m².     Right Atrium:  The right atrium is normal in size with an indexed volume of 21.17 ml/m².     Interatrial Septum:  The interatrial septum appears intact.     Aortic Valve:  The aortic valve anatomy cannot be determined with normal systolic excursion. There is calcification of the aortic valve leaflets. The peak transaortic velocity is 2.15 m/s, peak transaortic gradient is 18.5 mmHg and mean transaortic gradient is 11.0 mmHg with an LVOT/aortic valve VTI ratio of 0.64. The aortic valve area is estimated at 2.67 cm² by the continuity equation. There is no evidence of aortic regurgitation.     Mitral Valve:  Structurally normal mitral valve with normal leaflet excursion. There is calcification of the mitral valve annulus.     Tricuspid Valve:  Structurally normal tricuspid valve with normal leaflet excursion. There is trace tricuspid regurgitation. Estimated pulmonary artery systolic pressure is 8 mmHg.     Aorta:  The aortic root at the sinuses of Valsalva is normal in size, measuring 3.50 cm (indexed 1.91 cm/m²). The ascending aorta is dilated, measuring 4.10 cm (indexed 2.24 cm/m²).     Pericardium:  There is a trace pericardial effusion noted adjacent to the posterior left ventricle.     Systemic Veins:  The inferior vena cava is normal in size measuring 1.36 cm in diameter, (normal <2.1cm) with normal inspiratory collapse (normal >50%) consistent with normal right atrial pressure (~3, range 0-5mmHg).  ____________________________________________________________________  QUANTITATIVE DATA:  Left Ventricle Measurements: (Indexed to BSA)     IVSd (2D):   1.0 cm  LVPWd (2D):  1.0 cm  LVIDd (2D):  5.3 cm  LVIDs (2D):  3.6 cm  LV Mass:     203 g  111.2 g/m²  LV Vol d, MOD A2C: 97.8 ml  53.50 ml/m²  LV Vol d, MOD A4C: 121.0 ml 66.19 ml/m²  LV Vol d, MOD BP:  109.5 ml 59.90 ml/m²  LV Vol s, MOD A2C: 36.4 ml  19.91 ml/m²  LV Vol s, MOD A4C: 49.5 ml  27.08 ml/m²  LV Vol s, MOD BP:  44.0 ml  24.04 ml/m²  LVOT SV MOD BP:    65.5 ml  LV EF% MOD BP:     60 %     MV E Vmax:    1.02 m/s  MV A Vmax:    0.93 m/s  MV E/A:       1.10  e' lateral:   13.10 cm/s  e' medial:    9.25 cm/s  E/e' lateral: 7.79  E/e' medial:  11.03  E/e' Average: 9.13  MV DT:        151 msec    Aorta Measurements: (Normal range) (Indexed to BSA)     Ao Root d     3.50 cm (3.1 - 3.7 cm) 1.91 cm/m²  Ao Asc d, 2D: 4.10  Ao Asc prox:  4.10 cm               2.24 cm/m²    Left Atrium Measurements: (Indexed to BSA)  LA Diam 2D: 4.40 cm    Right Ventricle Measurements: Right Atrial Measurements:     TAPSE:            2.9 cm      RA Vol:            38.70 ml  RV Base (RVID1):  3.7cm      RA Vol s, MOD A4C  38.7 ml  RV Mid (RVID2):   3.1 cm      RA Vol Index:      21.17 ml/m²  RV Major (RVID3): 8.0 cm      RA Area s, MOD A4C 14.7 cm²  LVOT / RVOT/ Qp/Qs Data: (Indexed to BSA)  LVOT Diameter:  2.30 cm  LVOT Area:      4.15cm²  LVOT Vmax:      1.34 m/s  LVOT Vmn:       0.949 m/s  LVOT VTI:       26.30 cm  LVOT peak grad: 7 mmHg  LVOT mean grad: 4.0 mmHg  LVOT SV:        109.3 ml  59.78 ml/m²    Aortic Valve Measurements:  AV Vmax:                2.2 m/s  AV Peak Gradient:       18.5 mmHg  AV Mean Gradient:       11.0 mmHg  AV VTI:                 41.0 cm  AV VTI Ratio:           0.64  AoV EOA, Contin:        2.67 cm²  AoV EOA, Contin i:      1.46 cm²/m²  AoV Dimensionless Index 0.64    Mitral Valve Measurements:     MV E Vmax: 1.0 m/s  MV A Vmax: 0.9 m/s  MV E/A:    1.1    Tricuspid Valve Measurements:     TR Vmax:          1.2 m/s  TR Peak Gradient: 5.3 mmHg  RA Pressure:      3 mmHg  PASP:             8 mmHg    ________________________________________________________________________________________  Electronically signed on 11/30/2024 at 1:57:15 PM by Kya Gonzalez MD         *** Final ***

## 2024-12-14 NOTE — PROGRESS NOTE ADULT - SUBJECTIVE AND OBJECTIVE BOX
Resting    Vital Signs Last 24 Hrs  T(C): 36.5 (12-14-24 @ 19:58), Max: 37.1 (12-14-24 @ 12:10)  T(F): 97.7 (12-14-24 @ 19:58), Max: 98.8 (12-14-24 @ 12:10)  HR: 65 (12-14-24 @ 19:58) (63 - 70)  BP: 154/63 (12-14-24 @ 19:58) (142/80 - 164/68)  RR: 18 (12-14-24 @ 19:58) (18 - 18)  SpO2: 99% (12-14-24 @ 12:10) (99% - 100%)    I&O's Detail    13 Dec 2024 07:01  -  14 Dec 2024 07:00  --------------------------------------------------------  OUT:    Voided (mL): 500 mL  Total OUT: 500 mL    14 Dec 2024 07:01  -  14 Dec 2024 22:19  --------------------------------------------------------  OUT:    Voided (mL): 800 mL  Total OUT: 800 mL    Respiratory: b/l air entry  Cardiovascular: S1 S2  Gastrointestinal: soft, ND  Extremities: no edema                       8.4    4.37  )-----------( 442      ( 14 Dec 2024 07:01 )             25.9     14 Dec 2024 07:01    132    |  100    |  41     ----------------------------<  316    4.7     |  27     |  1.10     Ca    11.2       14 Dec 2024 07:01    TPro  6.5    /  Alb  2.1    /  TBili  0.5    /  DBili  x      /  AST  36     /  ALT  43     /  AlkPhos  116    13 Dec 2024 06:45    LIVER FUNCTIONS - ( 13 Dec 2024 06:45 )  Alb: 2.1 g/dL / Pro: 6.5 g/dL / ALK PHOS: 116 U/L / ALT: 43 U/L / AST: 36 U/L / GGT: x           aluminum hydroxide/magnesium hydroxide/simethicone Suspension 30 milliLiter(s) Oral every 4 hours PRN  amLODIPine   Tablet 10 milliGRAM(s) Oral daily  ascorbic acid 500 milliGRAM(s) Oral two times a day  azaTHIOprine 50 milliGRAM(s) Oral two times a day  buPROPion XL (24-Hour) . 300 milliGRAM(s) Oral daily  carvedilol 12.5 milliGRAM(s) Oral every 12 hours  cloNIDine 0.1 milliGRAM(s) Oral three times a day  dextrose 5%. 1000 milliLiter(s) IV Continuous <Continuous>  dextrose 5%. 1000 milliLiter(s) IV Continuous <Continuous>  dextrose 50% Injectable 25 Gram(s) IV Push once  dextrose 50% Injectable 12.5 Gram(s) IV Push once  dextrose 50% Injectable 25 Gram(s) IV Push once  dextrose Oral Gel 15 Gram(s) Oral once PRN  escitalopram 10 milliGRAM(s) Oral <User Schedule>  ferrous    sulfate 325 milliGRAM(s) Oral two times a day  glucagon  Injectable 1 milliGRAM(s) IntraMuscular once  hydrALAZINE 100 milliGRAM(s) Oral three times a day  hydrALAZINE Injectable 10 milliGRAM(s) IV Push every 8 hours PRN  insulin glargine Injectable (LANTUS) 30 Unit(s) SubCutaneous at bedtime  insulin lispro (ADMELOG) corrective regimen sliding scale   SubCutaneous three times a day before meals  insulin lispro Injectable (ADMELOG) 7 Unit(s) SubCutaneous three times a day before meals  levothyroxine 88 MICROGram(s) Oral <User Schedule>  lisinopril 40 milliGRAM(s) Oral daily  melatonin 3 milliGRAM(s) Oral at bedtime PRN  metoprolol tartrate Injectable 5 milliGRAM(s) IV Push every 6 hours PRN  mineral oil enema 133 milliLiter(s) Rectal once  ondansetron Injectable 4 milliGRAM(s) IV Push every 8 hours PRN  pantoprazole    Tablet 40 milliGRAM(s) Oral two times a day  polyethylene glycol 3350 17 Gram(s) Oral daily  predniSONE   Tablet 20 milliGRAM(s) Oral daily  pyridoxine 50 milliGRAM(s) Oral daily  rosuvastatin 10 milliGRAM(s) Oral at bedtime  senna 2 Tablet(s) Oral at bedtime  sodium zirconium cyclosilicate 5 Gram(s) Oral <User Schedule>  tacrolimus 2 milliGRAM(s) Oral daily  tacrolimus 1 milliGRAM(s) Oral at bedtime    Assessment and Plan:   	  Hx Kidney transplant 2012, good renal fx   S/p sepsis, UTI, Sacral osteomyelitis, Gram negative bacteremia  Non-PTH hypercalcemia iso malignancy   Imaging w/multiple masses in liver and spleen  W/up per Heme/Onc   S/p Aredia 12/6  Will re-dose if Ca is higher in am   Plan for liver biopsy on Monday   F/u BMP, Tacrolimus level     [phone]

## 2024-12-14 NOTE — PROGRESS NOTE ADULT - SUBJECTIVE AND OBJECTIVE BOX
[facility]  Kaz Bermeo PA-C  [address] [place], [place] [id number]  [phone]      INTERVAL HPI/OVERNIGHT EVENTS:  Pt s/e  Awaiting biopsy Monday  No new GI events    MEDICATIONS  (STANDING):  amLODIPine   Tablet 10 milliGRAM(s) Oral daily  ascorbic acid 500 milliGRAM(s) Oral two times a day  azaTHIOprine 50 milliGRAM(s) Oral two times a day  buPROPion XL (24-Hour) . 300 milliGRAM(s) Oral daily  carvedilol 12.5 milliGRAM(s) Oral every 12 hours  cloNIDine 0.1 milliGRAM(s) Oral three times a day  dextrose 5%. 1000 milliLiter(s) (50 mL/Hr) IV Continuous <Continuous>  dextrose 5%. 1000 milliLiter(s) (100 mL/Hr) IV Continuous <Continuous>  dextrose 50% Injectable 25 Gram(s) IV Push once  dextrose 50% Injectable 12.5 Gram(s) IV Push once  dextrose 50% Injectable 25 Gram(s) IV Push once  escitalopram 10 milliGRAM(s) Oral <User Schedule>  ferrous    sulfate 325 milliGRAM(s) Oral two times a day  glucagon  Injectable 1 milliGRAM(s) IntraMuscular once  hydrALAZINE 100 milliGRAM(s) Oral three times a day  insulin glargine Injectable (LANTUS) 24 Unit(s) SubCutaneous at bedtime  insulin lispro (ADMELOG) corrective regimen sliding scale   SubCutaneous three times a day before meals  insulin lispro Injectable (ADMELOG) 7 Unit(s) SubCutaneous three times a day before meals  levothyroxine 88 MICROGram(s) Oral <User Schedule>  lisinopril 40 milliGRAM(s) Oral daily  mineral oil enema 133 milliLiter(s) Rectal once  multivitamin/minerals/iron Oral Solution (CENTRUM) 15 milliLiter(s) Oral daily  pantoprazole    Tablet 40 milliGRAM(s) Oral two times a day  polyethylene glycol 3350 17 Gram(s) Oral daily  predniSONE   Tablet 20 milliGRAM(s) Oral daily  pyridoxine 50 milliGRAM(s) Oral daily  rosuvastatin 10 milliGRAM(s) Oral at bedtime  senna 2 Tablet(s) Oral at bedtime  sodium zirconium cyclosilicate 5 Gram(s) Oral <User Schedule>  tacrolimus 1 milliGRAM(s) Oral at bedtime  tacrolimus 2 milliGRAM(s) Oral daily    MEDICATIONS  (PRN):  aluminum hydroxide/magnesium hydroxide/simethicone Suspension 30 milliLiter(s) Oral every 4 hours PRN Dyspepsia  dextrose Oral Gel 15 Gram(s) Oral once PRN Blood Glucose LESS THAN 70 milliGRAM(s)/deciliter  hydrALAZINE Injectable 10 milliGRAM(s) IV Push every 8 hours PRN SBP >180 and HR 60-70bpm  melatonin 3 milliGRAM(s) Oral at bedtime PRN Insomnia  metoprolol tartrate Injectable 5 milliGRAM(s) IV Push every 6 hours PRN SBP >170  ondansetron Injectable 4 milliGRAM(s) IV Push every 8 hours PRN Nausea and/or Vomiting      Allergies  No Known Allergies      PHYSICAL EXAM:   Vital Signs:  Vital Signs Last 24 Hrs  T(C): 36.7 (14 Dec 2024 05:09), Max: 36.7 (14 Dec 2024 05:09)  T(F): 98.1 (14 Dec 2024 05:09), Max: 98.1 (14 Dec 2024 05:09)  HR: 63 (14 Dec 2024 05:09) (60 - 63)  BP: 147/68 (14 Dec 2024 05:09) (94/56 - 156/65)  BP(mean): --  RR: 18 (14 Dec 2024 05:09) (18 - 18)  SpO2: 100% (14 Dec 2024 05:09) (96% - 100%)    Parameters below as of 14 Dec 2024 05:09  Patient On (Oxygen Delivery Method): room air      Daily     Daily Weight in k.7 (14 Dec 2024 05:09)    GENERAL:  Appears stated age  HEENT:  NC/AT  CHEST:  Full & symmetric excursion  HEART:  Regular rhythm  ABDOMEN:  Soft, non-tender, non-distended  EXTEREMITIES:  no cyanosis  SKIN:  No rash  NEURO:  Alert      LABS:                        8.4    4.37  )-----------( 442      ( 14 Dec 2024 07:01 )             25.9     12-14    132[L]  |  100  |  41[H]  ----------------------------<  316[H]  4.7   |  27  |  1.10    Ca    11.2[H]      14 Dec 2024 07:01    TPro  6.5  /  Alb  2.1[L]  /  TBili  0.5  /  DBili  x   /  AST  36  /  ALT  43  /  AlkPhos  116  12-13      Urinalysis Basic - ( 14 Dec 2024 07:01 )    Color: x / Appearance: x / SG: x / pH: x  Gluc: 316 mg/dL / Ketone: x  / Bili: x / Urobili: x   Blood: x / Protein: x / Nitrite: x   Leuk Esterase: x / RBC: x / WBC x   Sq Epi: x / Non Sq Epi: x / Bacteria: x

## 2024-12-14 NOTE — PROGRESS NOTE ADULT - ASSESSMENT
Patient is a 70yo M, PMH DM, HTN, HLD, h/o kidney transplant, hypothyroidism, presents to ED from [facility] for AMS likely  acute  metabolic encephalopathy       AMS 2/2 Sepsis 2/2 multiple infections (UTI, sacral infection, possible osteomyelitis), E coli bacteremia   acute  metabolic encephalopathy   Sepsis 2/2 ESBL Ecoli UTI and bacteremia. sensitive to ertapenem.  -Completed course of  Meropenum until 12/10  -Tylenol PRN pain and fever- diet as tolerated   -aspiration and fall risk - Continue Senna, Miralax, PRN dulcolax suppositories   -MR pelvis/sacrum to eval for osteomyelitis-  Again seen are comminuted bilateral sacral lona fractures with marked osseous edema and marked loss of normal T1 fatty marrow. Osseous edema with loss of normal T1 fatty marrow at the caudal aspect of the left posterior iliac bone adjacent to the sacroiliac joint. Previously seen fracture component is better visualized on CT. These findings could be secondary to extensive fracture deformities in an osteopenic patient versus osteomyelitis if there was a history of a soft tissue ulcer extending to bone versus metastatic disease given the marked loss of T1 fatty marrow if there is a history of malignancy. Clinical correlation with patient history is advised.  - Per Ortho no surgical intervention for fracture of sacrum  / needs dolores     Acute on Chronic anemia   - Likely due to infection,  and CKD- No signs of acute  bleeding noted   - Iron22/ sat low    iron deficiency with chr anemia  +  - S/p  1unit of pRBC today,  given hx of renal transplant will use irradiated PRBC   - Hem Dr. Parson following     Hepatic lesion  - CT abd with 5.5cm lesion on R hepatic lobe also noticed  retroperitoneal  fibrosis  and spleen mass   - histopath not fully reported by  [facility] in sept /2024 paper work   - hem/onc  DR PARSON / and nephro dr vizcarra   would like to  repeat   liver biopsy by ir  after mri abd  - MRI abdomen reviewed, noted for hepatic lesion.   - D/w Onc, plan for IR guided biopsy 12/16/2024.      Diabetes Mellitus / hyperglycemia  - A1c 7.4  - Added  Pre meal Insulin, continue   Lantus to 24 u QHS  - ISS  - carb controlled diet    HTN  - Continue Lisinopril 40 mg  daily   - Continue Hydralazine 100mg TID with hold parameters  - Continue Coreg, switched from Metoprolol - 12.5 mg po bid  - Continue Amlodipine 10mg daily -  - added on clonidine 0.1  mg tid   - fu bp closely     HLD  Continue Crestor 10mg daily    s/p Kidney transplant/CKD 3  - Continue Tacrolimus 2mg daily  - Continue Tacrolimus 1mg QHS  - Continue Azathioprine 50mg BID  - Nephrology consulted dr vizcarra     Hypothyroidism  - Continue Levothyroxine 88mcg QAM    Hypercalcemia  - possible  sec to  underlying  lymphoproliferative disorder  - sec to vit D  s/p  calcitonin   s/p   Aredia   - Calcitonin q 12 hrs X 3 doses  - Nephro following    Depression  Continue Escitalopram 10mg TID  Continue Bupropion 300mg daily        DVT ppx: Hold AC due to anemia and risk of bleeding         wife updated. Ludmila Azeem [phone]. She agreed with Liver biopsy on Monday   Dispo: JOCELYNN bernal

## 2024-12-14 NOTE — PROGRESS NOTE ADULT - ASSESSMENT
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  - CBC noted, transfuse PRN   - PPI for ppx  - Monitor for any overt GI bleeding  - Repeat IR bx planned for Monday  - Outside path noted  - ?component of AIH      I reviewed the overnight course of events on the unit, re-confirming the patient history. I discussed the care with the patient.  Differential diagnosis and plan of care discussed with patient after the evaluation.  35 minutes spent on total encounter of which more than fifty percent of the encounter was spent counseling and/or coordinating care by the attending physician.

## 2024-12-14 NOTE — PROGRESS NOTE ADULT - ATTENDING COMMENTS
70yo M, PMH DM, HTN, HLD, h/o kidney transplant, hypothyroidism, presents to ED from [facility] for AMS, found to be febrile with positive UA. 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 Cardiology called for pericardial effusion.      - No evidence of any meaningful volume overload    - No evidence of any active ischemia   - Continue statin   - Continue to hold ASA in setting of persistent anemia, though, neg FOBT.  s/p PRBC's for Hgb of 6.5 (12/10).  There appears to be no clear indication for it   - Heme and GI following for anemia.  With liver mass on MRI, planned for liver Bx via IR on Monday, 12/16.    - No contraindication from cardiac standpoint given electrolytes WNL.  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   - No acute orthopedic surgical intervention at this time.

## 2024-12-14 NOTE — PROGRESS NOTE ADULT - ASSESSMENT
70yo M, PMH DM, HTN, HLD, h/o kidney transplant, hypothyroidism, presents to ED from [facility] for AMS, found to be febrile with positive UA. 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 Cardiology called for pericardial effusion.     Uncontrolled HTN/Pericardial Effusion  - CT chest: Small left pleural effusion with small loculated components and small to moderate pericardial effusion  - TTE: EF 60%, trace pericardial effusion adjacent to the posterior LV.  No tamponade physiology.  No significant valvular disease  - No evidence of any meaningful volume overload     - EKG with nonspecific TWI anteriorly, repeat unchanged  - No evidence of any active ischemia   - Continue statin   - Continue to hold ASA in setting of persistent anemia, though, neg FOBT.  s/p PRBC's for Hgb of 6.5 (12/10).  There appears to be no clear indication for it  - Continue to trend and transfuse per primary   - Heme and GI following for anemia.  With liver mass on MRI, planned for liver Bx via IR on Monday, 12/16.    - No contraindication from cardiac standpoint given electrolytes WNL.  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   - No acute orthopedic surgical intervention at this time.    - Monitor and replete Lytes. Keep K > 4 and Mg > 2  - Will continue to follow.

## 2024-12-14 NOTE — PROGRESS NOTE ADULT - SUBJECTIVE AND OBJECTIVE BOX
CAPILLARY BLOOD GLUCOSE      POCT Blood Glucose.: 263 mg/dL (14 Dec 2024 11:50)  POCT Blood Glucose.: 343 mg/dL (14 Dec 2024 07:40)  POCT Blood Glucose.: 347 mg/dL (14 Dec 2024 01:32)  POCT Blood Glucose.: 426 mg/dL (13 Dec 2024 21:31)  POCT Blood Glucose.: 438 mg/dL (13 Dec 2024 21:18)  POCT Blood Glucose.: 342 mg/dL (13 Dec 2024 16:46)      Vital Signs Last 24 Hrs  T(C): 36.7 (14 Dec 2024 05:09), Max: 36.7 (14 Dec 2024 05:09)  T(F): 98.1 (14 Dec 2024 05:09), Max: 98.1 (14 Dec 2024 05:09)  HR: 63 (14 Dec 2024 05:09) (60 - 63)  BP: 147/68 (14 Dec 2024 05:09) (94/56 - 156/65)  BP(mean): --  RR: 18 (14 Dec 2024 05:09) (18 - 18)  SpO2: 100% (14 Dec 2024 05:09) (96% - 100%)    Parameters below as of 14 Dec 2024 05:09  Patient On (Oxygen Delivery Method): room air        General: WN/WD NAD  Respiratory: CTA B/L  CV: RRR, S1S2, no murmurs, rubs or gallops  Abdominal: Soft, NT, ND +BS, Last BM  Extremities: No edema, + peripheral pulses     12-14    132[L]  |  100  |  41[H]  ----------------------------<  316[H]  4.7   |  27  |  1.10    Ca    11.2[H]      14 Dec 2024 07:01    TPro  6.5  /  Alb  2.1[L]  /  TBili  0.5  /  DBili  x   /  AST  36  /  ALT  43  /  AlkPhos  116  12-13      dextrose 50% Injectable 25 Gram(s) IV Push once  dextrose 50% Injectable 12.5 Gram(s) IV Push once  dextrose 50% Injectable 25 Gram(s) IV Push once  dextrose Oral Gel 15 Gram(s) Oral once PRN  glucagon  Injectable 1 milliGRAM(s) IntraMuscular once  insulin glargine Injectable (LANTUS) 24 Unit(s) SubCutaneous at bedtime  insulin lispro (ADMELOG) corrective regimen sliding scale   SubCutaneous three times a day before meals  insulin lispro Injectable (ADMELOG) 7 Unit(s) SubCutaneous three times a day before meals  levothyroxine 88 MICROGram(s) Oral <User Schedule>  predniSONE   Tablet 20 milliGRAM(s) Oral daily  rosuvastatin 10 milliGRAM(s) Oral at bedtime

## 2024-12-14 NOTE — PROGRESS NOTE ADULT - SUBJECTIVE AND OBJECTIVE BOX
All interim records and events noted.    resting in bed, comfortable      MEDICATIONS  (STANDING):  amLODIPine   Tablet 10 milliGRAM(s) Oral daily  ascorbic acid 500 milliGRAM(s) Oral two times a day  azaTHIOprine 50 milliGRAM(s) Oral two times a day  buPROPion XL (24-Hour) . 300 milliGRAM(s) Oral daily  carvedilol 12.5 milliGRAM(s) Oral every 12 hours  cloNIDine 0.1 milliGRAM(s) Oral three times a day  dextrose 5%. 1000 milliLiter(s) (100 mL/Hr) IV Continuous <Continuous>  dextrose 5%. 1000 milliLiter(s) (50 mL/Hr) IV Continuous <Continuous>  dextrose 50% Injectable 25 Gram(s) IV Push once  dextrose 50% Injectable 12.5 Gram(s) IV Push once  dextrose 50% Injectable 25 Gram(s) IV Push once  escitalopram 10 milliGRAM(s) Oral <User Schedule>  ferrous    sulfate 325 milliGRAM(s) Oral two times a day  glucagon  Injectable 1 milliGRAM(s) IntraMuscular once  hydrALAZINE 100 milliGRAM(s) Oral three times a day  insulin glargine Injectable (LANTUS) 24 Unit(s) SubCutaneous at bedtime  insulin lispro (ADMELOG) corrective regimen sliding scale   SubCutaneous three times a day before meals  insulin lispro Injectable (ADMELOG) 7 Unit(s) SubCutaneous three times a day before meals  levothyroxine 88 MICROGram(s) Oral <User Schedule>  lisinopril 40 milliGRAM(s) Oral daily  mineral oil enema 133 milliLiter(s) Rectal once  multivitamin/minerals/iron Oral Solution (CENTRUM) 15 milliLiter(s) Oral daily  pantoprazole    Tablet 40 milliGRAM(s) Oral two times a day  polyethylene glycol 3350 17 Gram(s) Oral daily  predniSONE   Tablet 20 milliGRAM(s) Oral daily  pyridoxine 50 milliGRAM(s) Oral daily  rosuvastatin 10 milliGRAM(s) Oral at bedtime  senna 2 Tablet(s) Oral at bedtime  sodium zirconium cyclosilicate 5 Gram(s) Oral <User Schedule>  tacrolimus 2 milliGRAM(s) Oral daily  tacrolimus 1 milliGRAM(s) Oral at bedtime    MEDICATIONS  (PRN):  aluminum hydroxide/magnesium hydroxide/simethicone Suspension 30 milliLiter(s) Oral every 4 hours PRN Dyspepsia  dextrose Oral Gel 15 Gram(s) Oral once PRN Blood Glucose LESS THAN 70 milliGRAM(s)/deciliter  hydrALAZINE Injectable 10 milliGRAM(s) IV Push every 8 hours PRN SBP >180 and HR 60-70bpm  melatonin 3 milliGRAM(s) Oral at bedtime PRN Insomnia  metoprolol tartrate Injectable 5 milliGRAM(s) IV Push every 6 hours PRN SBP >170  ondansetron Injectable 4 milliGRAM(s) IV Push every 8 hours PRN Nausea and/or Vomiting      Vital Signs Last 24 Hrs  T(C): 36.7 (14 Dec 2024 05:09), Max: 36.7 (14 Dec 2024 05:09)  T(F): 98.1 (14 Dec 2024 05:09), Max: 98.1 (14 Dec 2024 05:09)  HR: 63 (14 Dec 2024 05:09) (60 - 63)  BP: 147/68 (14 Dec 2024 05:09) (94/56 - 156/65)  BP(mean): --  RR: 18 (14 Dec 2024 05:09) (18 - 18)  SpO2: 100% (14 Dec 2024 05:09) (96% - 100%)    Parameters below as of 14 Dec 2024 05:09  Patient On (Oxygen Delivery Method): room air        PHYSICAL EXAM  General: in no acute distress  Head: atraumatic, normocephalic  Neck: supple, trachea midline  CV: S1 S2, regular rate and rhythm  Lungs: clear to auscultation, no wheezes/rhonchi  Abdomen: soft, nontender, bowel sounds present, no palpable masses  Skin: no significant increased ecchymosis/petechiae        LABS:             8.4    4.37  )-----------( 442      ( 12-14 @ 07:01 )             25.9                8.1    3.22  )-----------( 431      ( 12-13 @ 06:45 )             24.8                8.8    3.77  )-----------( 453      ( 12-12 @ 06:25 )             26.9       12-14    132[L]  |  100  |  41[H]  ----------------------------<  316[H]  4.7   |  27  |  1.10    Ca    11.2[H]      14 Dec 2024 07:01    TPro  6.5  /  Alb  2.1[L]  /  TBili  0.5  /  DBili  x   /  AST  36  /  ALT  43  /  AlkPhos  116  12-13        RADIOLOGY & ADDITIONAL STUDIES:  < from: MR Abdomen w/wo IV Cont (12.09.24 @ 13:56) >    ACC: [id number] EXAM:  MR ABDOMEN WAW IC   ORDERED BY: STACIA MOTLEY     PROCEDURE DATE:  12/09/2024          INTERPRETATION:  CLINICAL INFORMATION: Liver and splenic mass. History of   renal transplant.    COMPARISON: MR abdomen 12/2/2024    CONTRAST/COMPLICATIONS:  IV Contrast: Gadavist  7.5 cc administered   0 cc discarded  Oral Contrast: NONE  .    PROCEDURE:  MRI of the abdomen was performed.  MRCP was performed.    FINDINGS:  LOWER CHEST: Cardiomegaly. Small left pleural effusion with adjacent   consolidation, which could represent atelectasis.    LIVER: Lobulated T2 hyperintense mass with mild heterogeneous enhancement   in segment 5/8, measuring 5.6 x 5.5 x 6.6 cm. The mass demonstrates   intense restricted diffusion with a central T2 hyperintense region that   has delayed enhancement. No discrete evidence of washout.  BILE DUCTS: Normal caliber.  GALLBLADDER: Within normal limits.  SPLEEN: Enlarged measuring 17.7 cm. Large heterogeneously enhancing mass   with diffusion restrictingcomponents, measuring 7.3 x 6.9 x 7.4 cm.  PANCREAS: Within normal limits.  ADRENALS: Within normal limits.  KIDNEYS/URETERS: Left nephrectomy. Atrophic right kidney without   hydronephrosis. Partially imaged right lower quadrant renal transplant.    VISUALIZED PORTIONS:  BOWEL: Within normal limits.  PERITONEUM: No ascites.  VESSELS: Within normal limits.  RETROPERITONEUM/LYMPH NODES: Unchanged partially imaged confluent   retroperitoneal soft tissue encasing the aorta and IVC, unchanged. No   lymphadenopathy.  ABDOMINAL WALL: Within normal limits.  BONES: Within normal limits.    IMPRESSION:  Solid enhancing masses in the liver and spleen. Differential diagnosis   includes lymphoma and metastatic disease. Primary hepatobiliary   malignancy is a less likely consideration for the liver lesion.    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    --- End of Report ---      < end of copied text >      IMPRESSION/RECOMMENDATIONS:

## 2024-12-14 NOTE — PROGRESS NOTE ADULT - ASSESSMENT
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

## 2024-12-14 NOTE — PROGRESS NOTE ADULT - SUBJECTIVE AND OBJECTIVE BOX
Patient is a 69y old  Male who presents with a chief complaint of AMS (13 Dec 2024 14:08)      INTERVAL HPI/OVERNIGHT EVENTS: Patient seen and examined at bedside. Awake, alert, comfortable. answers some of the questions, no to chest pain, palpitations sob, pain in general     MEDICATIONS  (STANDING):  amLODIPine   Tablet 10 milliGRAM(s) Oral daily  ascorbic acid 500 milliGRAM(s) Oral two times a day  azaTHIOprine 50 milliGRAM(s) Oral two times a day  buPROPion XL (24-Hour) . 300 milliGRAM(s) Oral daily  carvedilol 12.5 milliGRAM(s) Oral every 12 hours  cloNIDine 0.1 milliGRAM(s) Oral three times a day  dextrose 5%. 1000 milliLiter(s) (50 mL/Hr) IV Continuous <Continuous>  dextrose 5%. 1000 milliLiter(s) (100 mL/Hr) IV Continuous <Continuous>  dextrose 50% Injectable 25 Gram(s) IV Push once  dextrose 50% Injectable 12.5 Gram(s) IV Push once  dextrose 50% Injectable 25 Gram(s) IV Push once  escitalopram 10 milliGRAM(s) Oral <User Schedule>  ferrous    sulfate 325 milliGRAM(s) Oral two times a day  glucagon  Injectable 1 milliGRAM(s) IntraMuscular once  hydrALAZINE 100 milliGRAM(s) Oral three times a day  insulin glargine Injectable (LANTUS) 24 Unit(s) SubCutaneous at bedtime  insulin lispro (ADMELOG) corrective regimen sliding scale   SubCutaneous three times a day before meals  insulin lispro Injectable (ADMELOG) 7 Unit(s) SubCutaneous three times a day before meals  levothyroxine 88 MICROGram(s) Oral <User Schedule>  lisinopril 40 milliGRAM(s) Oral daily  mineral oil enema 133 milliLiter(s) Rectal once  multivitamin/minerals/iron Oral Solution (CENTRUM) 15 milliLiter(s) Oral daily  pantoprazole    Tablet 40 milliGRAM(s) Oral two times a day  polyethylene glycol 3350 17 Gram(s) Oral daily  predniSONE   Tablet 20 milliGRAM(s) Oral daily  pyridoxine 50 milliGRAM(s) Oral daily  rosuvastatin 10 milliGRAM(s) Oral at bedtime  senna 2 Tablet(s) Oral at bedtime  sodium zirconium cyclosilicate 5 Gram(s) Oral <User Schedule>  tacrolimus 1 milliGRAM(s) Oral at bedtime  tacrolimus 2 milliGRAM(s) Oral daily    MEDICATIONS  (PRN):  aluminum hydroxide/magnesium hydroxide/simethicone Suspension 30 milliLiter(s) Oral every 4 hours PRN Dyspepsia  dextrose Oral Gel 15 Gram(s) Oral once PRN Blood Glucose LESS THAN 70 milliGRAM(s)/deciliter  hydrALAZINE Injectable 10 milliGRAM(s) IV Push every 8 hours PRN SBP >180 and HR 60-70bpm  melatonin 3 milliGRAM(s) Oral at bedtime PRN Insomnia  metoprolol tartrate Injectable 5 milliGRAM(s) IV Push every 6 hours PRN SBP >170  ondansetron Injectable 4 milliGRAM(s) IV Push every 8 hours PRN Nausea and/or Vomiting      Allergies    No Known Allergies    Intolerances        REVIEW OF SYSTEMS:  Per HPI. All other ROS noted negative   Vital Signs Last 24 Hrs  T(C): 36.7 (14 Dec 2024 05:09), Max: 36.7 (14 Dec 2024 05:09)  T(F): 98.1 (14 Dec 2024 05:09), Max: 98.1 (14 Dec 2024 05:09)  HR: 63 (14 Dec 2024 05:09) (60 - 63)  BP: 147/68 (14 Dec 2024 05:09) (94/56 - 156/65)  BP(mean): --  RR: 18 (14 Dec 2024 05:09) (18 - 18)  SpO2: 100% (14 Dec 2024 05:09) (96% - 100%)    Parameters below as of 14 Dec 2024 05:09  Patient On (Oxygen Delivery Method): room air        PHYSICAL EXAM:  GENERAL: NAD,  confused, calm   HEAD:  Atraumatic, Normocephalic  EYES: EOMI, conjunctiva and sclera clear  ENMT: Moist mucous membranes  NECK: Supple, No JVD  NERVOUS SYSTEM:  Alert   CHEST/LUNG: Clear to auscultation bilaterally; No rales, rhonchi, wheezing  HEART: Regular rate and rhythm  ABDOMEN: Soft, Nontender, Nondistended; Bowel sounds present  EXTREMITIES:   No clubbing, cyanosis    LABS:                        8.4    4.37  )-----------( 442      ( 14 Dec 2024 07:01 )             25.9     14 Dec 2024 07:01    132    |  100    |  41     ----------------------------<  316    4.7     |  27     |  1.10     Ca    11.2       14 Dec 2024 07:01        CAPILLARY BLOOD GLUCOSE      POCT Blood Glucose.: 343 mg/dL (14 Dec 2024 07:40)  POCT Blood Glucose.: 347 mg/dL (14 Dec 2024 01:32)  POCT Blood Glucose.: 426 mg/dL (13 Dec 2024 21:31)  POCT Blood Glucose.: 438 mg/dL (13 Dec 2024 21:18)  POCT Blood Glucose.: 342 mg/dL (13 Dec 2024 16:46)  POCT Blood Glucose.: 396 mg/dL (13 Dec 2024 11:51)  POCT Blood Glucose.: 378 mg/dL (13 Dec 2024 11:50)    BLOOD CULTURE    RADIOLOGY & ADDITIONAL TESTS:    Imaging Personally Reviewed:  [ ] YES     Consultant(s) Notes Reviewed:      Care Discussed with Consultants/Other Providers:

## 2024-12-15 LAB
ANION GAP SERPL CALC-SCNC: 3 MMOL/L — LOW (ref 5–17)
BUN SERPL-MCNC: 37 MG/DL — HIGH (ref 7–23)
CALCIUM SERPL-MCNC: 11.7 MG/DL — HIGH (ref 8.5–10.1)
CHLORIDE SERPL-SCNC: 101 MMOL/L — SIGNIFICANT CHANGE UP (ref 96–108)
CO2 SERPL-SCNC: 29 MMOL/L — SIGNIFICANT CHANGE UP (ref 22–31)
CREAT SERPL-MCNC: 0.92 MG/DL — SIGNIFICANT CHANGE UP (ref 0.5–1.3)
EGFR: 91 ML/MIN/1.73M2 — SIGNIFICANT CHANGE UP
GLUCOSE SERPL-MCNC: 227 MG/DL — HIGH (ref 70–99)
HCT VFR BLD CALC: 23.4 % — LOW (ref 39–50)
HGB BLD-MCNC: 7.8 G/DL — LOW (ref 13–17)
MCHC RBC-ENTMCNC: 29.9 PG — SIGNIFICANT CHANGE UP (ref 27–34)
MCHC RBC-ENTMCNC: 33.3 G/DL — SIGNIFICANT CHANGE UP (ref 32–36)
MCV RBC AUTO: 89.7 FL — SIGNIFICANT CHANGE UP (ref 80–100)
NRBC # BLD: 0 /100 WBCS — SIGNIFICANT CHANGE UP (ref 0–0)
PLATELET # BLD AUTO: 368 K/UL — SIGNIFICANT CHANGE UP (ref 150–400)
POTASSIUM SERPL-MCNC: 4.5 MMOL/L — SIGNIFICANT CHANGE UP (ref 3.5–5.3)
POTASSIUM SERPL-SCNC: 4.5 MMOL/L — SIGNIFICANT CHANGE UP (ref 3.5–5.3)
RBC # BLD: 2.61 M/UL — LOW (ref 4.2–5.8)
RBC # FLD: 18 % — HIGH (ref 10.3–14.5)
SODIUM SERPL-SCNC: 133 MMOL/L — LOW (ref 135–145)
WBC # BLD: 3.94 K/UL — SIGNIFICANT CHANGE UP (ref 3.8–10.5)
WBC # FLD AUTO: 3.94 K/UL — SIGNIFICANT CHANGE UP (ref 3.8–10.5)

## 2024-12-15 PROCEDURE — 99232 SBSQ HOSP IP/OBS MODERATE 35: CPT

## 2024-12-15 RX ADMIN — CARVEDILOL 12.5 MILLIGRAM(S): 25 TABLET, FILM COATED ORAL at 07:03

## 2024-12-15 RX ADMIN — Medication 4: at 08:06

## 2024-12-15 RX ADMIN — Medication 2: at 16:29

## 2024-12-15 RX ADMIN — CLONIDINE HYDROCHLORIDE 0.1 MILLIGRAM(S): 0.3 TABLET ORAL at 14:41

## 2024-12-15 RX ADMIN — PREDNISONE 20 MILLIGRAM(S): 20 TABLET ORAL at 07:05

## 2024-12-15 RX ADMIN — AZATHIOPRINE 50 MILLIGRAM(S): 100 TABLET ORAL at 07:03

## 2024-12-15 RX ADMIN — Medication 88 MICROGRAM(S): at 07:59

## 2024-12-15 RX ADMIN — PANTOPRAZOLE SODIUM 40 MILLIGRAM(S): 40 TABLET, DELAYED RELEASE ORAL at 18:14

## 2024-12-15 RX ADMIN — Medication 7 UNIT(S): at 11:24

## 2024-12-15 RX ADMIN — ESCITALOPRAM OXALATE 10 MILLIGRAM(S): 10 TABLET, FILM COATED ORAL at 07:05

## 2024-12-15 RX ADMIN — Medication 50 MILLIGRAM(S): at 12:37

## 2024-12-15 RX ADMIN — INSULIN GLARGINE 30 UNIT(S): 100 INJECTION, SOLUTION SUBCUTANEOUS at 21:38

## 2024-12-15 RX ADMIN — Medication 2 TABLET(S): at 21:39

## 2024-12-15 RX ADMIN — CLONIDINE HYDROCHLORIDE 0.1 MILLIGRAM(S): 0.3 TABLET ORAL at 07:04

## 2024-12-15 RX ADMIN — TACROLIMUS 2 MILLIGRAM(S): 5 CAPSULE ORAL at 12:37

## 2024-12-15 RX ADMIN — SODIUM ZIRCONIUM CYCLOSILICATE 5 GRAM(S): 5 POWDER, FOR SUSPENSION ORAL at 08:07

## 2024-12-15 RX ADMIN — TACROLIMUS 1 MILLIGRAM(S): 5 CAPSULE ORAL at 21:39

## 2024-12-15 RX ADMIN — CLONIDINE HYDROCHLORIDE 0.1 MILLIGRAM(S): 0.3 TABLET ORAL at 21:39

## 2024-12-15 RX ADMIN — Medication 325 MILLIGRAM(S): at 07:05

## 2024-12-15 RX ADMIN — POLYETHYLENE GLYCOL 3350 17 GRAM(S): 17 POWDER, FOR SOLUTION ORAL at 12:37

## 2024-12-15 RX ADMIN — Medication 500 MILLIGRAM(S): at 18:15

## 2024-12-15 RX ADMIN — AZATHIOPRINE 50 MILLIGRAM(S): 100 TABLET ORAL at 18:15

## 2024-12-15 RX ADMIN — Medication 4: at 11:24

## 2024-12-15 RX ADMIN — LISINOPRIL 40 MILLIGRAM(S): 20 TABLET ORAL at 07:03

## 2024-12-15 RX ADMIN — Medication 500 MILLIGRAM(S): at 07:04

## 2024-12-15 RX ADMIN — ROSUVASTATIN CALCIUM 10 MILLIGRAM(S): 5 TABLET, FILM COATED ORAL at 21:39

## 2024-12-15 RX ADMIN — ESCITALOPRAM OXALATE 10 MILLIGRAM(S): 10 TABLET, FILM COATED ORAL at 12:38

## 2024-12-15 RX ADMIN — AMLODIPINE BESYLATE 10 MILLIGRAM(S): 10 TABLET ORAL at 07:05

## 2024-12-15 RX ADMIN — Medication 7 UNIT(S): at 08:06

## 2024-12-15 RX ADMIN — Medication 300 MILLIGRAM(S): at 12:37

## 2024-12-15 RX ADMIN — HYDRALAZINE HYDROCHLORIDE 100 MILLIGRAM(S): 10 TABLET ORAL at 07:04

## 2024-12-15 RX ADMIN — CARVEDILOL 12.5 MILLIGRAM(S): 25 TABLET, FILM COATED ORAL at 18:15

## 2024-12-15 RX ADMIN — HYDRALAZINE HYDROCHLORIDE 100 MILLIGRAM(S): 10 TABLET ORAL at 14:41

## 2024-12-15 RX ADMIN — ESCITALOPRAM OXALATE 10 MILLIGRAM(S): 10 TABLET, FILM COATED ORAL at 18:14

## 2024-12-15 RX ADMIN — Medication 325 MILLIGRAM(S): at 18:15

## 2024-12-15 RX ADMIN — HYDRALAZINE HYDROCHLORIDE 100 MILLIGRAM(S): 10 TABLET ORAL at 21:39

## 2024-12-15 RX ADMIN — PANTOPRAZOLE SODIUM 40 MILLIGRAM(S): 40 TABLET, DELAYED RELEASE ORAL at 07:06

## 2024-12-15 RX ADMIN — Medication 7 UNIT(S): at 16:29

## 2024-12-15 NOTE — PROGRESS NOTE ADULT - SUBJECTIVE AND OBJECTIVE BOX
Patient is a 69y old  Male who presents with a chief complaint of AMS (14 Dec 2024 22:18)       INTERVAL HPI/OVERNIGHT EVENTS: Patient seen and examined at bedside. Awake, alert. Denies chest pain, sob. Answers some questions at baseline     MEDICATIONS  (STANDING):  amLODIPine   Tablet 10 milliGRAM(s) Oral daily  ascorbic acid 500 milliGRAM(s) Oral two times a day  azaTHIOprine 50 milliGRAM(s) Oral two times a day  buPROPion XL (24-Hour) . 300 milliGRAM(s) Oral daily  carvedilol 12.5 milliGRAM(s) Oral every 12 hours  cloNIDine 0.1 milliGRAM(s) Oral three times a day  dextrose 5%. 1000 milliLiter(s) (50 mL/Hr) IV Continuous <Continuous>  dextrose 5%. 1000 milliLiter(s) (100 mL/Hr) IV Continuous <Continuous>  dextrose 50% Injectable 25 Gram(s) IV Push once  dextrose 50% Injectable 12.5 Gram(s) IV Push once  dextrose 50% Injectable 25 Gram(s) IV Push once  escitalopram 10 milliGRAM(s) Oral <User Schedule>  ferrous    sulfate 325 milliGRAM(s) Oral two times a day  glucagon  Injectable 1 milliGRAM(s) IntraMuscular once  hydrALAZINE 100 milliGRAM(s) Oral three times a day  insulin glargine Injectable (LANTUS) 30 Unit(s) SubCutaneous at bedtime  insulin lispro (ADMELOG) corrective regimen sliding scale   SubCutaneous three times a day before meals  insulin lispro Injectable (ADMELOG) 7 Unit(s) SubCutaneous three times a day before meals  levothyroxine 88 MICROGram(s) Oral <User Schedule>  lisinopril 40 milliGRAM(s) Oral daily  mineral oil enema 133 milliLiter(s) Rectal once  pantoprazole    Tablet 40 milliGRAM(s) Oral two times a day  polyethylene glycol 3350 17 Gram(s) Oral daily  predniSONE   Tablet 20 milliGRAM(s) Oral daily  pyridoxine 50 milliGRAM(s) Oral daily  rosuvastatin 10 milliGRAM(s) Oral at bedtime  senna 2 Tablet(s) Oral at bedtime  sodium zirconium cyclosilicate 5 Gram(s) Oral <User Schedule>  tacrolimus 2 milliGRAM(s) Oral daily  tacrolimus 1 milliGRAM(s) Oral at bedtime    MEDICATIONS  (PRN):  aluminum hydroxide/magnesium hydroxide/simethicone Suspension 30 milliLiter(s) Oral every 4 hours PRN Dyspepsia  dextrose Oral Gel 15 Gram(s) Oral once PRN Blood Glucose LESS THAN 70 milliGRAM(s)/deciliter  hydrALAZINE Injectable 10 milliGRAM(s) IV Push every 8 hours PRN SBP >180 and HR 60-70bpm  melatonin 3 milliGRAM(s) Oral at bedtime PRN Insomnia  metoprolol tartrate Injectable 5 milliGRAM(s) IV Push every 6 hours PRN SBP >170  ondansetron Injectable 4 milliGRAM(s) IV Push every 8 hours PRN Nausea and/or Vomiting      Allergies    No Known Allergies    Intolerances        REVIEW OF SYSTEMS:  Patient answers some questions at baseline. Per HPI.     Vital Signs Last 24 Hrs  T(C): 37.7 (15 Dec 2024 05:11), Max: 37.7 (15 Dec 2024 05:11)  T(F): 99.9 (15 Dec 2024 05:11), Max: 99.9 (15 Dec 2024 05:11)  HR: 63 (15 Dec 2024 05:11) (63 - 70)  BP: 131/57 (15 Dec 2024 05:11) (131/57 - 164/68)  BP(mean): --  RR: 18 (15 Dec 2024 05:11) (18 - 18)  SpO2: 97% (15 Dec 2024 05:11) (97% - 99%)    Parameters below as of 15 Dec 2024 05:11  Patient On (Oxygen Delivery Method): room air        PHYSICAL EXAM:  GENERAL: NAD,  confused, calm   HEAD:  Atraumatic, Normocephalic  EYES: EOMI, conjunctiva and sclera clear  ENMT: Moist mucous membranes  NECK: Supple, No JVD  NERVOUS SYSTEM:  Alert   CHEST/LUNG: Clear to auscultation bilaterally; No rales, rhonchi, wheezing  HEART: Regular rate and rhythm  ABDOMEN: Soft, Nontender, Nondistended; Bowel sounds present  EXTREMITIES:   No clubbing, cyanosis    LABS:                        7.8    3.94  )-----------( 368      ( 15 Dec 2024 05:46 )             23.4     15 Dec 2024 05:46    133    |  101    |  37     ----------------------------<  227    4.5     |  29     |  0.92     Ca    11.7       15 Dec 2024 05:46        CAPILLARY BLOOD GLUCOSE      POCT Blood Glucose.: 205 mg/dL (15 Dec 2024 07:52)  POCT Blood Glucose.: 319 mg/dL (14 Dec 2024 23:15)  POCT Blood Glucose.: 293 mg/dL (14 Dec 2024 21:05)  POCT Blood Glucose.: 274 mg/dL (14 Dec 2024 16:41)  POCT Blood Glucose.: 263 mg/dL (14 Dec 2024 11:50)    BLOOD CULTURE    RADIOLOGY & ADDITIONAL TESTS:    Imaging Personally Reviewed:  [ ] YES     Consultant(s) Notes Reviewed:      Care Discussed with Consultants/Other Providers:

## 2024-12-15 NOTE — PROGRESS NOTE ADULT - SUBJECTIVE AND OBJECTIVE BOX
Resting    Vital Signs Last 24 Hrs  T(C): 36.8 (12-15-24 @ 12:20), Max: 37.7 (12-15-24 @ 05:11)  T(F): 98.2 (12-15-24 @ 12:20), Max: 99.9 (12-15-24 @ 05:11)  HR: 61 (12-15-24 @ 14:38) (61 - 65)  BP: 114/63 (12-15-24 @ 14:38) (114/63 - 154/63)  RR: 18 (12-15-24 @ 12:20) (18 - 18)  SpO2: 95% (12-15-24 @ 12:20) (95% - 97%)    I&O's Detail    14 Dec 2024 07:01  -  15 Dec 2024 07:00  --------------------------------------------------------  OUT:    Voided (mL): 800 mL  Total OUT: 800 mL    Respiratory: b/l air entry  Cardiovascular: S1 S2  Gastrointestinal: soft, ND  Extremities: no edema                                7.8    3.94  )-----------( 368      ( 15 Dec 2024 05:46 )             23.4     15 Dec 2024 05:46    133    |  101    |  37     ----------------------------<  227    4.5     |  29     |  0.92     Ca    11.7       15 Dec 2024 05:46    aluminum hydroxide/magnesium hydroxide/simethicone Suspension 30 milliLiter(s) Oral every 4 hours PRN  amLODIPine   Tablet 10 milliGRAM(s) Oral daily  ascorbic acid 500 milliGRAM(s) Oral two times a day  azaTHIOprine 50 milliGRAM(s) Oral two times a day  buPROPion XL (24-Hour) . 300 milliGRAM(s) Oral daily  carvedilol 12.5 milliGRAM(s) Oral every 12 hours  cloNIDine 0.1 milliGRAM(s) Oral three times a day  dextrose 5%. 1000 milliLiter(s) IV Continuous <Continuous>  dextrose 5%. 1000 milliLiter(s) IV Continuous <Continuous>  dextrose 50% Injectable 25 Gram(s) IV Push once  dextrose 50% Injectable 12.5 Gram(s) IV Push once  dextrose 50% Injectable 25 Gram(s) IV Push once  dextrose Oral Gel 15 Gram(s) Oral once PRN  escitalopram 10 milliGRAM(s) Oral <User Schedule>  ferrous    sulfate 325 milliGRAM(s) Oral two times a day  glucagon  Injectable 1 milliGRAM(s) IntraMuscular once  hydrALAZINE 100 milliGRAM(s) Oral three times a day  hydrALAZINE Injectable 10 milliGRAM(s) IV Push every 8 hours PRN  insulin glargine Injectable (LANTUS) 30 Unit(s) SubCutaneous at bedtime  insulin lispro (ADMELOG) corrective regimen sliding scale   SubCutaneous three times a day before meals  insulin lispro Injectable (ADMELOG) 7 Unit(s) SubCutaneous three times a day before meals  levothyroxine 88 MICROGram(s) Oral <User Schedule>  lisinopril 40 milliGRAM(s) Oral daily  melatonin 3 milliGRAM(s) Oral at bedtime PRN  metoprolol tartrate Injectable 5 milliGRAM(s) IV Push every 6 hours PRN  mineral oil enema 133 milliLiter(s) Rectal once  ondansetron Injectable 4 milliGRAM(s) IV Push every 8 hours PRN  pantoprazole    Tablet 40 milliGRAM(s) Oral two times a day  polyethylene glycol 3350 17 Gram(s) Oral daily  predniSONE   Tablet 20 milliGRAM(s) Oral daily  pyridoxine 50 milliGRAM(s) Oral daily  rosuvastatin 10 milliGRAM(s) Oral at bedtime  senna 2 Tablet(s) Oral at bedtime  sodium zirconium cyclosilicate 5 Gram(s) Oral <User Schedule>  tacrolimus 2 milliGRAM(s) Oral daily  tacrolimus 1 milliGRAM(s) Oral at bedtime    Assessment and Plan:   	  Hx Kidney transplant 2012, good renal fx   S/p sepsis, UTI, Sacral osteomyelitis, Gram negative bacteremia  Non-PTH hypercalcemia iso malignancy   Imaging w/multiple masses in liver and spleen  W/up per Heme/Onc   S/p Aredia 12/6  Can re-dose for Ca > 12  Plan for liver biopsy   F/u BMP, Tacrolimus level     [phone]

## 2024-12-15 NOTE — PROGRESS NOTE ADULT - ASSESSMENT
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

## 2024-12-15 NOTE — PROGRESS NOTE ADULT - ASSESSMENT
70yo M, PMH DM, HTN, HLD, h/o kidney transplant, hypothyroidism, presents to ED from [facility] for AMS, found to be febrile with positive UA. 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 Cardiology called for pericardial effusion.     Uncontrolled HTN/Pericardial Effusion  - CT chest: Small left pleural effusion with small loculated components and small to moderate pericardial effusion  - TTE: EF 60%, trace pericardial effusion adjacent to the posterior LV.  No tamponade physiology.  No significant valvular disease  - No evidence of any meaningful volume overload     - EKG with nonspecific TWI anteriorly, repeat unchanged  - No evidence of any active ischemia   - Continue statin   - Continue to hold ASA in setting of persistent anemia, though, neg FOBT.  s/p PRBC's for Hgb of 6.5 (12/10).  There appears to be no clear indication for it  - hgh downtrending. would consider PM h+h to monitor levels with biopsy planned for tm 12/15  - Continue to trend and transfuse per primary     - Heme and GI following for anemia.  With liver mass on MRI, planned for liver Bx via IR on Monday, 12/16.    - No contraindication from cardiac standpoint given electrolytes WNL.  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   - No acute orthopedic surgical intervention at this time.    - Monitor and replete Lytes. Keep K > 4 and Mg > 2  - Will continue to follow.

## 2024-12-15 NOTE — PROGRESS NOTE ADULT - ASSESSMENT
Patient is a 70yo M, PMH DM, HTN, HLD, h/o kidney transplant, hypothyroidism, presents to ED from [facility] for AMS likely  acute  metabolic encephalopathy       AMS 2/2 Sepsis 2/2 multiple infections (UTI, sacral infection, possible osteomyelitis), E coli bacteremia   acute  metabolic encephalopathy : Completed course of Antibiotics, At baseline Mental Status   Sepsis 2/2 ESBL Ecoli UTI and bacteremia. sensitive to ertapenem.  -Completed course of  Meropenum until 12/10  -Tylenol PRN pain and fever- diet as tolerated   -aspiration and fall risk - Continue Senna, Miralax, PRN dulcolax suppositories   -MR pelvis/sacrum to eval for osteomyelitis-  Again seen are comminuted bilateral sacral lona fractures with marked osseous edema and marked loss of normal T1 fatty marrow. Osseous edema with loss of normal T1 fatty marrow at the caudal aspect of the left posterior iliac bone adjacent to the sacroiliac joint. Previously seen fracture component is better visualized on CT. These findings could be secondary to extensive fracture deformities in an osteopenic patient versus osteomyelitis if there was a history of a soft tissue ulcer extending to bone versus metastatic disease given the marked loss of T1 fatty marrow if there is a history of malignancy. Clinical correlation with patient history is advised.  - Per Ortho no surgical intervention for fracture of sacrum  / needs dolores     Acute on Chronic anemia  - Likely due to infection,  and CKD- No signs of acute  bleeding noted   - Iron22/ sat low    iron deficiency with chr anemia  +  - S/p  1unit of pRBC today,  given hx of renal transplant will use irradiated PRBC   - Hem Dr. Sal following     Hepatic lesion  - CT abd with 5.5cm lesion on R hepatic lobe also noticed  retroperitoneal  fibrosis  and spleen mass   - histopath not fully reported by  [facility] in sept /2024 paper work   - hem/onc  DR SAL / and nephro dr vizcarra   would like to  repeat   liver biopsy by ir  after mri abd  - MRI abdomen reviewed, noted for hepatic lesion.   - D/w Onc, plan for IR guided biopsy 12/16/2024.      Diabetes Mellitus / hyperglycemia  - A1c 7.4  - Added  Pre meal Insulin, continue Lantus. Will adjust doses as needed   - ISS  - carb controlled diet    HTN  - Continue Lisinopril 40 mg  daily   - Continue Hydralazine 100mg TID with hold parameters  - Continue Coreg, switched from Metoprolol - 12.5 mg po bid  - Continue Amlodipine 10mg daily -  - added on clonidine 0.1  mg tid   - fu bp closely     HLD  Continue Crestor 10mg daily    s/p Kidney transplant/CKD 3  - Continue Tacrolimus 2mg daily  - Continue Tacrolimus 1mg QHS  - Continue Azathioprine 50mg BID  - Nephrology consulted dr vizcarra     Hypothyroidism  - Continue Levothyroxine 88mcg QAM    Hypercalcemia  - possible  sec to  underlying  lymphoproliferative disorder  - sec to vit D  s/p  calcitonin   s/p   Aredia   - Calcitonin q 12 hrs X 3 doses  - Nephro following    Depression  Continue Escitalopram 10mg TID  Continue Bupropion 300mg daily        DVT ppx: Hold AC due to anemia and risk of bleeding         wife updated. Ludmila Gann [phone]. She agreed with Liver biopsy on Monday   Dispo: JOCELYNN bernal

## 2024-12-15 NOTE — PROGRESS NOTE ADULT - ASSESSMENT
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  - CBC noted, transfuse PRN   - PPI for ppx  - Monitor for any overt GI bleeding  - Repeat IR bx planned for Monday  - Outside path noted  - ?component of AIH    I reviewed the overnight course of events on the unit, re-confirming the patient history. I discussed the care with the patient.  Differential diagnosis and plan of care discussed with patient after the evaluation.  35 minutes spent on total encounter of which more than fifty percent of the encounter was spent counseling and/or coordinating care by the attending physician.

## 2024-12-15 NOTE — PROGRESS NOTE ADULT - SUBJECTIVE AND OBJECTIVE BOX
[facility]  Kaz Bermeo PA-C  [address] [place] [address]  [phone]      INTERVAL HPI/OVERNIGHT EVENTS:  Pt s/e  Awaiting bx tomorrow  No GI events    MEDICATIONS  (STANDING):  amLODIPine   Tablet 10 milliGRAM(s) Oral daily  ascorbic acid 500 milliGRAM(s) Oral two times a day  azaTHIOprine 50 milliGRAM(s) Oral two times a day  buPROPion XL (24-Hour) . 300 milliGRAM(s) Oral daily  carvedilol 12.5 milliGRAM(s) Oral every 12 hours  cloNIDine 0.1 milliGRAM(s) Oral three times a day  dextrose 5%. 1000 milliLiter(s) (50 mL/Hr) IV Continuous <Continuous>  dextrose 5%. 1000 milliLiter(s) (100 mL/Hr) IV Continuous <Continuous>  dextrose 50% Injectable 25 Gram(s) IV Push once  dextrose 50% Injectable 12.5 Gram(s) IV Push once  dextrose 50% Injectable 25 Gram(s) IV Push once  escitalopram 10 milliGRAM(s) Oral <User Schedule>  ferrous    sulfate 325 milliGRAM(s) Oral two times a day  glucagon  Injectable 1 milliGRAM(s) IntraMuscular once  hydrALAZINE 100 milliGRAM(s) Oral three times a day  insulin glargine Injectable (LANTUS) 30 Unit(s) SubCutaneous at bedtime  insulin lispro (ADMELOG) corrective regimen sliding scale   SubCutaneous three times a day before meals  insulin lispro Injectable (ADMELOG) 7 Unit(s) SubCutaneous three times a day before meals  levothyroxine 88 MICROGram(s) Oral <User Schedule>  lisinopril 40 milliGRAM(s) Oral daily  mineral oil enema 133 milliLiter(s) Rectal once  pantoprazole    Tablet 40 milliGRAM(s) Oral two times a day  polyethylene glycol 3350 17 Gram(s) Oral daily  predniSONE   Tablet 20 milliGRAM(s) Oral daily  pyridoxine 50 milliGRAM(s) Oral daily  rosuvastatin 10 milliGRAM(s) Oral at bedtime  senna 2 Tablet(s) Oral at bedtime  sodium zirconium cyclosilicate 5 Gram(s) Oral <User Schedule>  tacrolimus 2 milliGRAM(s) Oral daily  tacrolimus 1 milliGRAM(s) Oral at bedtime    MEDICATIONS  (PRN):  aluminum hydroxide/magnesium hydroxide/simethicone Suspension 30 milliLiter(s) Oral every 4 hours PRN Dyspepsia  dextrose Oral Gel 15 Gram(s) Oral once PRN Blood Glucose LESS THAN 70 milliGRAM(s)/deciliter  hydrALAZINE Injectable 10 milliGRAM(s) IV Push every 8 hours PRN SBP >180 and HR 60-70bpm  melatonin 3 milliGRAM(s) Oral at bedtime PRN Insomnia  metoprolol tartrate Injectable 5 milliGRAM(s) IV Push every 6 hours PRN SBP >170  ondansetron Injectable 4 milliGRAM(s) IV Push every 8 hours PRN Nausea and/or Vomiting      Allergies    No Known Allergies    PHYSICAL EXAM:   Vital Signs:  Vital Signs Last 24 Hrs  T(C): 37.7 (15 Dec 2024 05:11), Max: 37.7 (15 Dec 2024 05:11)  T(F): 99.9 (15 Dec 2024 05:11), Max: 99.9 (15 Dec 2024 05:11)  HR: 63 (15 Dec 2024 05:11) (63 - 70)  BP: 131/57 (15 Dec 2024 05:11) (131/57 - 164/68)  BP(mean): --  RR: 18 (15 Dec 2024 05:11) (18 - 18)  SpO2: 97% (15 Dec 2024 05:11) (97% - 99%)    Parameters below as of 15 Dec 2024 05:11  Patient On (Oxygen Delivery Method): room air      Daily     Daily Weight in k.1 (15 Dec 2024 05:11)    GENERAL:  Appears stated age  HEENT:  NC/AT  CHEST:  Full & symmetric excursion  HEART:  Regular rhythm  ABDOMEN:  Soft, non-tender, non-distended  EXTEREMITIES:  no cyanosis  SKIN:  No rash      LABS:                        7.8    3.94  )-----------( 368      ( 15 Dec 2024 05:46 )             23.4     12-15    133[L]  |  101  |  37[H]  ----------------------------<  227[H]  4.5   |  29  |  0.92    Ca    11.7[H]      15 Dec 2024 05:46        Urinalysis Basic - ( 15 Dec 2024 05:46 )    Color: x / Appearance: x / SG: x / pH: x  Gluc: 227 mg/dL / Ketone: x  / Bili: x / Urobili: x   Blood: x / Protein: x / Nitrite: x   Leuk Esterase: x / RBC: x / WBC x   Sq Epi: x / Non Sq Epi: x / Bacteria: x

## 2024-12-15 NOTE — PROGRESS NOTE ADULT - SUBJECTIVE AND OBJECTIVE BOX
All interim records and events noted.    awake alert but does not respond to quetions  appear weak but comfortable      MEDICATIONS  (STANDING):  amLODIPine   Tablet 10 milliGRAM(s) Oral daily  ascorbic acid 500 milliGRAM(s) Oral two times a day  azaTHIOprine 50 milliGRAM(s) Oral two times a day  buPROPion XL (24-Hour) . 300 milliGRAM(s) Oral daily  carvedilol 12.5 milliGRAM(s) Oral every 12 hours  cloNIDine 0.1 milliGRAM(s) Oral three times a day  dextrose 5%. 1000 milliLiter(s) (50 mL/Hr) IV Continuous <Continuous>  dextrose 5%. 1000 milliLiter(s) (100 mL/Hr) IV Continuous <Continuous>  dextrose 50% Injectable 25 Gram(s) IV Push once  dextrose 50% Injectable 12.5 Gram(s) IV Push once  dextrose 50% Injectable 25 Gram(s) IV Push once  escitalopram 10 milliGRAM(s) Oral <User Schedule>  ferrous    sulfate 325 milliGRAM(s) Oral two times a day  glucagon  Injectable 1 milliGRAM(s) IntraMuscular once  hydrALAZINE 100 milliGRAM(s) Oral three times a day  insulin glargine Injectable (LANTUS) 30 Unit(s) SubCutaneous at bedtime  insulin lispro (ADMELOG) corrective regimen sliding scale   SubCutaneous three times a day before meals  insulin lispro Injectable (ADMELOG) 7 Unit(s) SubCutaneous three times a day before meals  levothyroxine 88 MICROGram(s) Oral <User Schedule>  lisinopril 40 milliGRAM(s) Oral daily  mineral oil enema 133 milliLiter(s) Rectal once  pantoprazole    Tablet 40 milliGRAM(s) Oral two times a day  polyethylene glycol 3350 17 Gram(s) Oral daily  predniSONE   Tablet 20 milliGRAM(s) Oral daily  pyridoxine 50 milliGRAM(s) Oral daily  rosuvastatin 10 milliGRAM(s) Oral at bedtime  senna 2 Tablet(s) Oral at bedtime  sodium zirconium cyclosilicate 5 Gram(s) Oral <User Schedule>  tacrolimus 2 milliGRAM(s) Oral daily  tacrolimus 1 milliGRAM(s) Oral at bedtime    MEDICATIONS  (PRN):  aluminum hydroxide/magnesium hydroxide/simethicone Suspension 30 milliLiter(s) Oral every 4 hours PRN Dyspepsia  dextrose Oral Gel 15 Gram(s) Oral once PRN Blood Glucose LESS THAN 70 milliGRAM(s)/deciliter  hydrALAZINE Injectable 10 milliGRAM(s) IV Push every 8 hours PRN SBP >180 and HR 60-70bpm  melatonin 3 milliGRAM(s) Oral at bedtime PRN Insomnia  metoprolol tartrate Injectable 5 milliGRAM(s) IV Push every 6 hours PRN SBP >170  ondansetron Injectable 4 milliGRAM(s) IV Push every 8 hours PRN Nausea and/or Vomiting      Vital Signs Last 24 Hrs  T(C): 37.7 (15 Dec 2024 05:11), Max: 37.7 (15 Dec 2024 05:11)  T(F): 99.9 (15 Dec 2024 05:11), Max: 99.9 (15 Dec 2024 05:11)  HR: 63 (15 Dec 2024 05:11) (63 - 67)  BP: 131/57 (15 Dec 2024 05:11) (131/57 - 164/68)  BP(mean): --  RR: 18 (15 Dec 2024 05:11) (18 - 18)  SpO2: 97% (15 Dec 2024 05:11) (97% - 97%)    Parameters below as of 15 Dec 2024 05:11  Patient On (Oxygen Delivery Method): room air        PHYSICAL EXAM  General:  in no acute distress  Head: atraumatic, normocephalic  ENT: sclera anicteric, buccal mucosa moist  Neck: supple, trachea midline,  CV: S1 S2, regular rate and rhythm  Lungs: clear to auscultation, no wheezes/rhonchi  Abdomen: soft, nontender, bowel sounds present  Extrem: no clubbing/cyanosis/edema  Skin: no significant increased ecchymosis/petechiae  Neuro: alert and responsive,  no focal deficits      LABS:             7.8    3.94  )-----------( 368      ( 12-15 @ 05:46 )             23.4                8.4    4.37  )-----------( 442      ( 12-14 @ 07:01 )             25.9                8.1    3.22  )-----------( 431      ( 12-13 @ 06:45 )             24.8       12-15    133[L]  |  101  |  37[H]  ----------------------------<  227[H]  4.5   |  29  |  0.92    Ca    11.7[H]      15 Dec 2024 05:46          RADIOLOGY & ADDITIONAL STUDIES:    IMPRESSION/RECOMMENDATIONS:

## 2024-12-15 NOTE — PROGRESS NOTE ADULT - TIME BILLING
Note written by attending. Meds, labs, vitals, chart reviewed. Plan d/w wife, patient's son Jose J as well

## 2024-12-15 NOTE — PROGRESS NOTE ADULT - SUBJECTIVE AND OBJECTIVE BOX
Patient is a 69y old  Male who presents with a chief complaint of AMS (15 Dec 2024 12:53)      HPI:  Patient is a 70yo M, PMH DM, HTN, HLD, h/o kidney transplant, hypothyroidism, presents to ED from [facility] for AMS. Patient is lethargic and unable to provide history at this time. Information obtained from transfer record and chart.   Patient received Zosyn and Azithromycin 11/12-11/18 for PNA per transfer record.  UA positive for UTI    Interval events: No interval events overnight. Patient with no complaints at this time. Eatin breakfast comfortably.        (29 Nov 2024 12:47)      PAST MEDICAL & SURGICAL HISTORY:  Diabetes Mellitus Type II  Insulin pump (2010)      GERD (gastroesophageal reflux disease)      Depression      Hypothyroidism      Hyperlipidemia      Kidney transplanted  2012      Hypertension      ANGELIKA (obstructive sleep apnea)      Peripheral neuropathy      Shoulder fracture  Left.      History of colonoscopy      S/P kidney transplant  2012      S/P cholecystectomy      Retroperitoneal fibrosis  s/p surgery      AV fistula  Right arm      S/P right cataract extraction      S/P left cataract extraction      H/O unilateral nephrectomy  Left                MEDICATIONS  (STANDING):  amLODIPine   Tablet 10 milliGRAM(s) Oral daily  ascorbic acid 500 milliGRAM(s) Oral two times a day  azaTHIOprine 50 milliGRAM(s) Oral two times a day  buPROPion XL (24-Hour) . 300 milliGRAM(s) Oral daily  carvedilol 12.5 milliGRAM(s) Oral every 12 hours  cloNIDine 0.1 milliGRAM(s) Oral three times a day  dextrose 5%. 1000 milliLiter(s) (50 mL/Hr) IV Continuous <Continuous>  dextrose 5%. 1000 milliLiter(s) (100 mL/Hr) IV Continuous <Continuous>  dextrose 50% Injectable 25 Gram(s) IV Push once  dextrose 50% Injectable 12.5 Gram(s) IV Push once  dextrose 50% Injectable 25 Gram(s) IV Push once  escitalopram 10 milliGRAM(s) Oral <User Schedule>  ferrous    sulfate 325 milliGRAM(s) Oral two times a day  glucagon  Injectable 1 milliGRAM(s) IntraMuscular once  hydrALAZINE 100 milliGRAM(s) Oral three times a day  insulin glargine Injectable (LANTUS) 30 Unit(s) SubCutaneous at bedtime  insulin lispro (ADMELOG) corrective regimen sliding scale   SubCutaneous three times a day before meals  insulin lispro Injectable (ADMELOG) 7 Unit(s) SubCutaneous three times a day before meals  levothyroxine 88 MICROGram(s) Oral <User Schedule>  lisinopril 40 milliGRAM(s) Oral daily  mineral oil enema 133 milliLiter(s) Rectal once  pantoprazole    Tablet 40 milliGRAM(s) Oral two times a day  polyethylene glycol 3350 17 Gram(s) Oral daily  predniSONE   Tablet 20 milliGRAM(s) Oral daily  pyridoxine 50 milliGRAM(s) Oral daily  rosuvastatin 10 milliGRAM(s) Oral at bedtime  senna 2 Tablet(s) Oral at bedtime  sodium zirconium cyclosilicate 5 Gram(s) Oral <User Schedule>  tacrolimus 2 milliGRAM(s) Oral daily  tacrolimus 1 milliGRAM(s) Oral at bedtime    MEDICATIONS  (PRN):  aluminum hydroxide/magnesium hydroxide/simethicone Suspension 30 milliLiter(s) Oral every 4 hours PRN Dyspepsia  dextrose Oral Gel 15 Gram(s) Oral once PRN Blood Glucose LESS THAN 70 milliGRAM(s)/deciliter  hydrALAZINE Injectable 10 milliGRAM(s) IV Push every 8 hours PRN SBP >180 and HR 60-70bpm  melatonin 3 milliGRAM(s) Oral at bedtime PRN Insomnia  metoprolol tartrate Injectable 5 milliGRAM(s) IV Push every 6 hours PRN SBP >170  ondansetron Injectable 4 milliGRAM(s) IV Push every 8 hours PRN Nausea and/or Vomiting      FAMILY HISTORY:  MI (myocardial infarction)    Family history of obesity (Sibling)      Denies Family history of CAD or early MI      Constitutional: denies fever, chills  HEENT: denies blurry vision, difficulty hearing  Respiratory: denies SOB,   Cardiovascular: denies CP, palpitations, LE edema  Gastrointestinal: denies nausea, vomiting, abdominal pain  Neurologic: denies headache, weakness,  ROS negative except as noted above      SOCIAL HISTORY:    No tobacco, alcohol or recreational drug use    Vital Signs Last 24 Hrs  T(C): 37.7 (15 Dec 2024 05:11), Max: 37.7 (15 Dec 2024 05:11)  T(F): 99.9 (15 Dec 2024 05:11), Max: 99.9 (15 Dec 2024 05:11)  HR: 63 (15 Dec 2024 05:11) (63 - 67)  BP: 131/57 (15 Dec 2024 05:11) (131/57 - 164/68)  BP(mean): --  RR: 18 (15 Dec 2024 05:11) (18 - 18)  SpO2: 97% (15 Dec 2024 05:11) (97% - 97%)    Parameters below as of 15 Dec 2024 05:11  Patient On (Oxygen Delivery Method): room air        Physical Exam:  General: NAD  HEENT: moist mucous membranes   Neck: nontender  Neurology: A&Ox3  Respiratory: CTA B/L, No W/R/R  CV: RRR, +S1/S2, no murmurs, rubs or gallops  Abdominal: Soft, NT, ND +BSx4, no palpable masses  Extremities: No C/C/E, + peripheral pulses  MSK: Normal ROM, no joint erythema or warmth, no joint swelling   Skin: warm, dry, normal color      I&O's Detail    14 Dec 2024 07:01  -  15 Dec 2024 07:00  --------------------------------------------------------  IN:  Total IN: 0 mL    OUT:    Voided (mL): 800 mL  Total OUT: 800 mL    Total NET: -800 mL          LABS:                        7.8    3.94  )-----------( 368      ( 15 Dec 2024 05:46 )             23.4     12-15    133[L]  |  101  |  37[H]  ----------------------------<  227[H]  4.5   |  29  |  0.92    Ca    11.7[H]      15 Dec 2024 05:46            Urinalysis Basic - ( 15 Dec 2024 05:46 )    Color: x / Appearance: x / SG: x / pH: x  Gluc: 227 mg/dL / Ketone: x  / Bili: x / Urobili: x   Blood: x / Protein: x / Nitrite: x   Leuk Esterase: x / RBC: x / WBC x   Sq Epi: x / Non Sq Epi: x / Bacteria: x      I&O's Summary    14 Dec 2024 07:01  -  15 Dec 2024 07:00  --------------------------------------------------------  IN: 0 mL / OUT: 800 mL / NET: -800 mL      12 Lead ECG:   Ventricular Rate 81 BPM    Atrial Rate 81 BPM    P-R Interval 148 ms    QRS Duration 104 ms    Q-T Interval 406 ms    QTC Calculation(Bazett) 471 ms    P Axis 44 degrees    R Axis 12 degrees    T Axis 53 degrees    Diagnosis Line Normal sinus rhythm  Normal ECG  When compared with ECG of 29-NOV-2024 07:11,  Nonspecific T wave abnormality, improved in Lateral leads  Confirmed by Kya Gonzalez ([id number]) on 12/1/2024 10:43:18 AM (12-01-24 @ 09:28)      TRANSTHORACIC ECHOCARDIOGRAM REPORT  ________________________________________________________________________________                                      _______       Pt. Name:       JAN CALVIN Study Date:    11/29/2024  MRN:            [id number]          YOB: 1955  Accession #:    [id number]         Age:           69 years  Account#:       [id number]        Gender:        M  Heart Rate:                       Height:        64.17 in (163.00 cm)  Rhythm:       Weight:        169.75 lb (77.00 kg)  Blood Pressure: 196/81 mmHg       BSA/BMI:       1.83 m² / 28.98 kg/m²  ________________________________________________________________________________________  Referring Physician:    [id number] Ale Ibrahim  Interpreting Physician: Kya Gonzalez MD  Primary Sonographer:    Butch Salazar    CPT:               ECHO TTE WO CON COMP W DOPP - 71606.m  Indication(s):     Cardiac murmur, unspecified - R01.1  Procedure:         Transthoracic echocardiogram with 2-D, M-mode and complete                     spectral and color flow Doppler.  Ordering Location: [facility]  Admission Status:  ED  ______________________________________________________________________________________     CONCLUSIONS:      1. Left ventricular cavity is normal in size. Left ventricular systolic function is normal with an ejection fraction of 60 % by Moreno's method of disks.   2. Trace pericardial effusion noted adjacent to the posterior left ventricle.   3. Normal right ventricular cavity size and normal right ventricular systolic function.   4. Aortic valve anatomy cannot be determined with normal systolic excursion. There is calcification of the aortic valve leaflets.   5. Structurally normal mitral valve with normalleaflet excursion.   6. Structurally normal tricuspid valve with normal leaflet excursion. Trace tricuspid regurgitation.   7. The inferior vena cava is normal in size measuring 1.36 cm in diameter, (normal <2.1cm) with normal inspiratory collapse (normal >50%) consistent with normal right atrial pressure (~3, range 0-5mmHg).  ________________________________________________________________________________________  FINDINGS:     Left Ventricle:  The left ventricular cavity is normal in size. Left ventricular systolic function is normal with a calculated ejection fraction of 60 % by the Moreno's biplane method of disks.     Right Ventricle:  The right ventricular cavity is normal in size and right ventricular systolic function is normal. Tricuspid annular plane systolic excursion (TAPSE) is 2.9 cm (normal >=1.7 cm).     Left Atrium:  The left atrium is normal in size with an indexed volume of 33.15 ml/m².     Right Atrium:  The right atrium is normal in size with an indexed volume of 21.17 ml/m².     Interatrial Septum:  The interatrial septum appears intact.     Aortic Valve:  The aortic valve anatomy cannot be determined with normal systolic excursion. There is calcification of the aortic valve leaflets. The peak transaortic velocity is 2.15 m/s, peak transaortic gradient is 18.5 mmHg and mean transaortic gradient is 11.0 mmHg with an LVOT/aortic valve VTI ratio of 0.64. The aortic valve area is estimated at 2.67 cm² by the continuity equation. There is no evidence of aortic regurgitation.     Mitral Valve:  Structurally normal mitral valve with normal leaflet excursion. There is calcification of the mitral valve annulus.     Tricuspid Valve:  Structurally normal tricuspid valve with normal leaflet excursion. There is trace tricuspid regurgitation. Estimated pulmonary artery systolic pressure is 8 mmHg.     Aorta:  The aortic root at the sinuses of Valsalva is normal in size, measuring 3.50 cm (indexed 1.91 cm/m²). The ascending aorta is dilated, measuring 4.10 cm (indexed 2.24 cm/m²).     Pericardium:  There is a trace pericardial effusion noted adjacent to the posterior left ventricle.     Systemic Veins:  The inferior vena cava is normal in size measuring 1.36 cm in diameter, (normal <2.1cm) with normal inspiratory collapse (normal >50%) consistent with normal right atrial pressure (~3, range 0-5mmHg).  ____________________________________________________________________  QUANTITATIVE DATA:  Left Ventricle Measurements: (Indexed to BSA)     IVSd (2D):   1.0 cm  LVPWd (2D):  1.0 cm  LVIDd (2D):  5.3 cm  LVIDs (2D):  3.6 cm  LV Mass:     203 g  111.2 g/m²  LV Vol d, MOD A2C: 97.8 ml  53.50 ml/m²  LV Vol d, MOD A4C: 121.0 ml 66.19 ml/m²  LV Vol d, MOD BP:  109.5 ml 59.90 ml/m²  LV Vol s, MOD A2C: 36.4 ml  19.91 ml/m²  LV Vol s, MOD A4C: 49.5 ml  27.08 ml/m²  LV Vol s, MOD BP:  44.0 ml  24.04 ml/m²  LVOT SV MOD BP:    65.5 ml  LV EF% MOD BP:     60 %     MV E Vmax:    1.02 m/s  MV A Vmax:    0.93 m/s  MV E/A:       1.10  e' lateral:   13.10 cm/s  e' medial:    9.25 cm/s  E/e' lateral: 7.79  E/e' medial:  11.03  E/e' Average: 9.13  MV DT:        151 msec    Aorta Measurements: (Normal range) (Indexed to BSA)     Ao Root d     3.50 cm (3.1 - 3.7 cm) 1.91 cm/m²  Ao Asc d, 2D: 4.10  Ao Asc prox:  4.10 cm               2.24 cm/m²    Left Atrium Measurements: (Indexed to BSA)  LA Diam 2D: 4.40 cm    Right Ventricle Measurements: Right Atrial Measurements:     TAPSE:            2.9 cm      RA Vol:            38.70 ml  RV Base (RVID1):  3.7cm      RA Vol s, MOD A4C  38.7 ml  RV Mid (RVID2):   3.1 cm      RA Vol Index:      21.17 ml/m²  RV Major (RVID3): 8.0 cm      RA Area s, MOD A4C 14.7 cm²  LVOT / RVOT/ Qp/Qs Data: (Indexed to BSA)  LVOT Diameter:  2.30 cm  LVOT Area:      4.15cm²  LVOT Vmax:      1.34 m/s  LVOT Vmn:       0.949 m/s  LVOT VTI:       26.30 cm  LVOT peak grad: 7 mmHg  LVOT mean grad: 4.0 mmHg  LVOT SV:        109.3 ml  59.78 ml/m²    Aortic Valve Measurements:  AV Vmax:                2.2 m/s  AV Peak Gradient:       18.5 mmHg  AV Mean Gradient:       11.0 mmHg  AV VTI:                 41.0 cm  AV VTI Ratio:           0.64  AoV EOA, Contin:        2.67 cm²  AoV EOA, Contin i:      1.46 cm²/m²  AoV Dimensionless Index 0.64    Mitral Valve Measurements:     MV E Vmax: 1.0 m/s  MV A Vmax: 0.9 m/s  MV E/A:    1.1    Tricuspid Valve Measurements:     TR Vmax:          1.2 m/s  TR Peak Gradient: 5.3 mmHg  RA Pressure:      3 mmHg  PASP:             8 mmHg    ________________________________________________________________________________________  Electronically signed on 11/30/2024 at 1:57:15 PM by Kya Gonzalez MD         *** Final ***

## 2024-12-16 LAB
ALBUMIN SERPL ELPH-MCNC: 2.1 G/DL — LOW (ref 3.3–5)
ALP SERPL-CCNC: 111 U/L — SIGNIFICANT CHANGE UP (ref 40–120)
ALT FLD-CCNC: 30 U/L — SIGNIFICANT CHANGE UP (ref 12–78)
ANION GAP SERPL CALC-SCNC: 7 MMOL/L — SIGNIFICANT CHANGE UP (ref 5–17)
AST SERPL-CCNC: 30 U/L — SIGNIFICANT CHANGE UP (ref 15–37)
BILIRUB SERPL-MCNC: 0.3 MG/DL — SIGNIFICANT CHANGE UP (ref 0.2–1.2)
BUN SERPL-MCNC: 34 MG/DL — HIGH (ref 7–23)
CALCIUM SERPL-MCNC: 11.3 MG/DL — HIGH (ref 8.5–10.1)
CHLORIDE SERPL-SCNC: 103 MMOL/L — SIGNIFICANT CHANGE UP (ref 96–108)
CO2 SERPL-SCNC: 25 MMOL/L — SIGNIFICANT CHANGE UP (ref 22–31)
CREAT SERPL-MCNC: 0.87 MG/DL — SIGNIFICANT CHANGE UP (ref 0.5–1.3)
EGFR: 95 ML/MIN/1.73M2 — SIGNIFICANT CHANGE UP
GLUCOSE SERPL-MCNC: 117 MG/DL — HIGH (ref 70–99)
HCT VFR BLD CALC: 26.3 % — LOW (ref 39–50)
HGB BLD-MCNC: 8.5 G/DL — LOW (ref 13–17)
MCHC RBC-ENTMCNC: 29.6 PG — SIGNIFICANT CHANGE UP (ref 27–34)
MCHC RBC-ENTMCNC: 32.3 G/DL — SIGNIFICANT CHANGE UP (ref 32–36)
MCV RBC AUTO: 91.6 FL — SIGNIFICANT CHANGE UP (ref 80–100)
NRBC # BLD: 0 /100 WBCS — SIGNIFICANT CHANGE UP (ref 0–0)
PLATELET # BLD AUTO: 336 K/UL — SIGNIFICANT CHANGE UP (ref 150–400)
POTASSIUM SERPL-MCNC: 4.7 MMOL/L — SIGNIFICANT CHANGE UP (ref 3.5–5.3)
POTASSIUM SERPL-SCNC: 4.7 MMOL/L — SIGNIFICANT CHANGE UP (ref 3.5–5.3)
PROT SERPL-MCNC: 6.9 G/DL — SIGNIFICANT CHANGE UP (ref 6–8.3)
RBC # BLD: 2.87 M/UL — LOW (ref 4.2–5.8)
RBC # FLD: 18.5 % — HIGH (ref 10.3–14.5)
SODIUM SERPL-SCNC: 135 MMOL/L — SIGNIFICANT CHANGE UP (ref 135–145)
WBC # BLD: 4.52 K/UL — SIGNIFICANT CHANGE UP (ref 3.8–10.5)
WBC # FLD AUTO: 4.52 K/UL — SIGNIFICANT CHANGE UP (ref 3.8–10.5)

## 2024-12-16 PROCEDURE — 88341 IMHCHEM/IMCYTCHM EA ADD ANTB: CPT | Mod: 26,59

## 2024-12-16 PROCEDURE — 99233 SBSQ HOSP IP/OBS HIGH 50: CPT

## 2024-12-16 PROCEDURE — 88365 INSITU HYBRIDIZATION (FISH): CPT | Mod: 26

## 2024-12-16 PROCEDURE — 88307 TISSUE EXAM BY PATHOLOGIST: CPT | Mod: 26

## 2024-12-16 PROCEDURE — 88342 IMHCHEM/IMCYTCHM 1ST ANTB: CPT | Mod: 26,59

## 2024-12-16 PROCEDURE — 88291 CYTO/MOLECULAR REPORT: CPT

## 2024-12-16 PROCEDURE — 88300 SURGICAL PATH GROSS: CPT | Mod: 26

## 2024-12-16 PROCEDURE — 77012 CT SCAN FOR NEEDLE BIOPSY: CPT | Mod: 26

## 2024-12-16 PROCEDURE — 47000 NEEDLE BIOPSY OF LIVER PERQ: CPT

## 2024-12-16 PROCEDURE — 88364 INSITU HYBRIDIZATION (FISH): CPT | Mod: 26

## 2024-12-16 PROCEDURE — 99232 SBSQ HOSP IP/OBS MODERATE 35: CPT

## 2024-12-16 RX ORDER — LISINOPRIL 20 MG/1
20 TABLET ORAL DAILY
Refills: 0 | Status: DISCONTINUED | OUTPATIENT
Start: 2024-12-17 | End: 2024-12-17

## 2024-12-16 RX ORDER — PAMIDRONATE DISODIUM 3 MG/ML
60 INJECTION, SOLUTION INTRAVENOUS ONCE
Refills: 0 | Status: COMPLETED | OUTPATIENT
Start: 2024-12-16 | End: 2024-12-16

## 2024-12-16 RX ORDER — 0.9 % SODIUM CHLORIDE 0.9 %
1000 INTRAVENOUS SOLUTION INTRAVENOUS
Refills: 0 | Status: DISCONTINUED | OUTPATIENT
Start: 2024-12-16 | End: 2024-12-18

## 2024-12-16 RX ORDER — 0.9 % SODIUM CHLORIDE 0.9 %
1000 INTRAVENOUS SOLUTION INTRAVENOUS
Refills: 0 | Status: DISCONTINUED | OUTPATIENT
Start: 2024-12-16 | End: 2024-12-16

## 2024-12-16 RX ADMIN — Medication 325 MILLIGRAM(S): at 17:41

## 2024-12-16 RX ADMIN — ROSUVASTATIN CALCIUM 10 MILLIGRAM(S): 5 TABLET, FILM COATED ORAL at 21:15

## 2024-12-16 RX ADMIN — INSULIN GLARGINE 30 UNIT(S): 100 INJECTION, SOLUTION SUBCUTANEOUS at 21:44

## 2024-12-16 RX ADMIN — AMLODIPINE BESYLATE 10 MILLIGRAM(S): 10 TABLET ORAL at 05:17

## 2024-12-16 RX ADMIN — LISINOPRIL 40 MILLIGRAM(S): 20 TABLET ORAL at 05:18

## 2024-12-16 RX ADMIN — PANTOPRAZOLE SODIUM 40 MILLIGRAM(S): 40 TABLET, DELAYED RELEASE ORAL at 17:41

## 2024-12-16 RX ADMIN — Medication 325 MILLIGRAM(S): at 05:17

## 2024-12-16 RX ADMIN — ESCITALOPRAM OXALATE 10 MILLIGRAM(S): 10 TABLET, FILM COATED ORAL at 05:23

## 2024-12-16 RX ADMIN — Medication 500 MILLIGRAM(S): at 05:18

## 2024-12-16 RX ADMIN — PANTOPRAZOLE SODIUM 40 MILLIGRAM(S): 40 TABLET, DELAYED RELEASE ORAL at 05:17

## 2024-12-16 RX ADMIN — CLONIDINE HYDROCHLORIDE 0.1 MILLIGRAM(S): 0.3 TABLET ORAL at 05:17

## 2024-12-16 RX ADMIN — AZATHIOPRINE 50 MILLIGRAM(S): 100 TABLET ORAL at 05:18

## 2024-12-16 RX ADMIN — ESCITALOPRAM OXALATE 10 MILLIGRAM(S): 10 TABLET, FILM COATED ORAL at 17:41

## 2024-12-16 RX ADMIN — HYDRALAZINE HYDROCHLORIDE 100 MILLIGRAM(S): 10 TABLET ORAL at 21:15

## 2024-12-16 RX ADMIN — PREDNISONE 20 MILLIGRAM(S): 20 TABLET ORAL at 05:17

## 2024-12-16 RX ADMIN — Medication 2 TABLET(S): at 21:16

## 2024-12-16 RX ADMIN — AZATHIOPRINE 50 MILLIGRAM(S): 100 TABLET ORAL at 17:41

## 2024-12-16 RX ADMIN — Medication 88 MICROGRAM(S): at 05:23

## 2024-12-16 RX ADMIN — Medication 500 MILLIGRAM(S): at 17:41

## 2024-12-16 RX ADMIN — PAMIDRONATE DISODIUM 86.67 MILLIGRAM(S): 3 INJECTION, SOLUTION INTRAVENOUS at 17:51

## 2024-12-16 RX ADMIN — CARVEDILOL 12.5 MILLIGRAM(S): 25 TABLET, FILM COATED ORAL at 17:41

## 2024-12-16 RX ADMIN — TACROLIMUS 1 MILLIGRAM(S): 5 CAPSULE ORAL at 21:15

## 2024-12-16 RX ADMIN — HYDRALAZINE HYDROCHLORIDE 100 MILLIGRAM(S): 10 TABLET ORAL at 05:18

## 2024-12-16 RX ADMIN — CARVEDILOL 12.5 MILLIGRAM(S): 25 TABLET, FILM COATED ORAL at 05:17

## 2024-12-16 NOTE — PROGRESS NOTE ADULT - SUBJECTIVE AND OBJECTIVE BOX
CAPILLARY BLOOD GLUCOSE      POCT Blood Glucose.: 130 mg/dL (16 Dec 2024 07:31)  POCT Blood Glucose.: 138 mg/dL (16 Dec 2024 06:35)  POCT Blood Glucose.: 258 mg/dL (15 Dec 2024 21:17)  POCT Blood Glucose.: 191 mg/dL (15 Dec 2024 16:21)  POCT Blood Glucose.: 226 mg/dL (15 Dec 2024 11:10)      Vital Signs Last 24 Hrs  T(C): 37.1 (16 Dec 2024 04:57), Max: 37.1 (16 Dec 2024 04:57)  T(F): 98.7 (16 Dec 2024 04:57), Max: 98.7 (16 Dec 2024 04:57)  HR: 61 (16 Dec 2024 04:57) (61 - 63)  BP: 134/53 (16 Dec 2024 04:57) (114/63 - 164/63)  BP(mean): --  RR: 18 (16 Dec 2024 04:57) (18 - 18)  SpO2: 98% (16 Dec 2024 04:57) (95% - 98%)    Parameters below as of 16 Dec 2024 04:57  Patient On (Oxygen Delivery Method): room air        General: WN/WD NAD  Respiratory: CTA B/L  CV: RRR, S1S2, no murmurs, rubs or gallops  Abdominal: Soft, NT, ND +BS, Last BM  Extremities: No edema, + peripheral pulses     12-15    133[L]  |  101  |  37[H]  ----------------------------<  227[H]  4.5   |  29  |  0.92    Ca    11.7[H]      15 Dec 2024 05:46        dextrose 50% Injectable 25 Gram(s) IV Push once  dextrose 50% Injectable 12.5 Gram(s) IV Push once  dextrose 50% Injectable 25 Gram(s) IV Push once  dextrose Oral Gel 15 Gram(s) Oral once PRN  glucagon  Injectable 1 milliGRAM(s) IntraMuscular once  insulin glargine Injectable (LANTUS) 30 Unit(s) SubCutaneous at bedtime  insulin lispro (ADMELOG) corrective regimen sliding scale   SubCutaneous three times a day before meals  insulin lispro Injectable (ADMELOG) 7 Unit(s) SubCutaneous three times a day before meals  levothyroxine 88 MICROGram(s) Oral <User Schedule>  predniSONE   Tablet 20 milliGRAM(s) Oral daily  rosuvastatin 10 milliGRAM(s) Oral at bedtime

## 2024-12-16 NOTE — PROGRESS NOTE ADULT - SUBJECTIVE AND OBJECTIVE BOX
Patient is a 69y old  Male who presents with a chief complaint of AMS (15 Dec 2024 17:58)       INTERVAL HPI/OVERNIGHT EVENTS: Patient seen and examined at bedside. Awake, alert. Denies chest pain, palpitations sob., abdominal pain, n/v/d/c     MEDICATIONS  (STANDING):  amLODIPine   Tablet 10 milliGRAM(s) Oral daily  ascorbic acid 500 milliGRAM(s) Oral two times a day  azaTHIOprine 50 milliGRAM(s) Oral two times a day  buPROPion XL (24-Hour) . 300 milliGRAM(s) Oral daily  carvedilol 12.5 milliGRAM(s) Oral every 12 hours  cloNIDine 0.1 milliGRAM(s) Oral three times a day  dextrose 5%. 1000 milliLiter(s) (50 mL/Hr) IV Continuous <Continuous>  dextrose 5%. 1000 milliLiter(s) (100 mL/Hr) IV Continuous <Continuous>  dextrose 50% Injectable 25 Gram(s) IV Push once  dextrose 50% Injectable 12.5 Gram(s) IV Push once  dextrose 50% Injectable 25 Gram(s) IV Push once  escitalopram 10 milliGRAM(s) Oral <User Schedule>  ferrous    sulfate 325 milliGRAM(s) Oral two times a day  glucagon  Injectable 1 milliGRAM(s) IntraMuscular once  hydrALAZINE 100 milliGRAM(s) Oral three times a day  insulin glargine Injectable (LANTUS) 30 Unit(s) SubCutaneous at bedtime  insulin lispro (ADMELOG) corrective regimen sliding scale   SubCutaneous three times a day before meals  insulin lispro Injectable (ADMELOG) 7 Unit(s) SubCutaneous three times a day before meals  levothyroxine 88 MICROGram(s) Oral <User Schedule>  lisinopril 40 milliGRAM(s) Oral daily  mineral oil enema 133 milliLiter(s) Rectal once  pantoprazole    Tablet 40 milliGRAM(s) Oral two times a day  polyethylene glycol 3350 17 Gram(s) Oral daily  predniSONE   Tablet 20 milliGRAM(s) Oral daily  pyridoxine 50 milliGRAM(s) Oral daily  rosuvastatin 10 milliGRAM(s) Oral at bedtime  senna 2 Tablet(s) Oral at bedtime  sodium zirconium cyclosilicate 5 Gram(s) Oral <User Schedule>  tacrolimus 1 milliGRAM(s) Oral at bedtime  tacrolimus 2 milliGRAM(s) Oral daily    MEDICATIONS  (PRN):  aluminum hydroxide/magnesium hydroxide/simethicone Suspension 30 milliLiter(s) Oral every 4 hours PRN Dyspepsia  dextrose Oral Gel 15 Gram(s) Oral once PRN Blood Glucose LESS THAN 70 milliGRAM(s)/deciliter  hydrALAZINE Injectable 10 milliGRAM(s) IV Push every 8 hours PRN SBP >180 and HR 60-70bpm  melatonin 3 milliGRAM(s) Oral at bedtime PRN Insomnia  metoprolol tartrate Injectable 5 milliGRAM(s) IV Push every 6 hours PRN SBP >170  ondansetron Injectable 4 milliGRAM(s) IV Push every 8 hours PRN Nausea and/or Vomiting      Allergies    No Known Allergies    Intolerances        REVIEW OF SYSTEMS:  CONSTITUTIONAL: No fever or chills   EYES: No eye pain, visual disturbances, or discharge  ENMT:  No difficulty hearing, tinnitus, vertigo; No sinus or throat pain  NECK: No pain or stiffness  RESPIRATORY: No cough, wheezing, chills or hemoptysis; No shortness of breath  CARDIOVASCULAR: No chest pain, palpitations, dizziness, or leg swelling  GASTROINTESTINAL: No abdominal or epigastric pain. No nausea, vomiting, or hematemesis; No diarrhea or constipation.   GENITOURINARY: No dysuria, frequency, hematuria, or incontinence  SKIN: No itching, burning, rashes, or lesions   LYMPH NODES: No enlarged glands    Vital Signs Last 24 Hrs  T(C): 37.1 (16 Dec 2024 04:57), Max: 37.1 (16 Dec 2024 04:57)  T(F): 98.7 (16 Dec 2024 04:57), Max: 98.7 (16 Dec 2024 04:57)  HR: 61 (16 Dec 2024 04:57) (61 - 63)  BP: 134/53 (16 Dec 2024 04:57) (114/63 - 164/63)  BP(mean): --  RR: 18 (16 Dec 2024 04:57) (18 - 18)  SpO2: 98% (16 Dec 2024 04:57) (95% - 98%)    Parameters below as of 16 Dec 2024 04:57  Patient On (Oxygen Delivery Method): room air        PHYSICAL EXAM:  GENERAL: NAD,  awake, alert   HEAD:  Atraumatic, Normocephalic  EYES: EOMI, conjunctiva and sclera clear  ENMT: Moist mucous membranes  NECK: Supple, No JVD  NERVOUS SYSTEM:  Alert   CHEST/LUNG: Clear to auscultation bilaterally; No rales, rhonchi, wheezing  HEART: Regular rate and rhythm  ABDOMEN: Soft, Nontender, Nondistended; Bowel sounds present  EXTREMITIES:   No clubbing, cyanosis  LABS:      Ca    11.7       15 Dec 2024 05:46        CAPILLARY BLOOD GLUCOSE      POCT Blood Glucose.: 130 mg/dL (16 Dec 2024 07:31)  POCT Blood Glucose.: 138 mg/dL (16 Dec 2024 06:35)  POCT Blood Glucose.: 258 mg/dL (15 Dec 2024 21:17)  POCT Blood Glucose.: 191 mg/dL (15 Dec 2024 16:21)  POCT Blood Glucose.: 226 mg/dL (15 Dec 2024 11:10)    BLOOD CULTURE    RADIOLOGY & ADDITIONAL TESTS:    Imaging Personally Reviewed:  [ ] YES     Consultant(s) Notes Reviewed:      Care Discussed with Consultants/Other Providers:

## 2024-12-16 NOTE — PROGRESS NOTE ADULT - ASSESSMENT
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 also noted  14cm splenomegaly since 2018  -repeat MRI abdomen-6.6cm liver mass, 17.7cm splenomegaly, partially visualized retroperitoneal soft tissue encasing aorta and IVC      -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 IMPRESSION    70yo man w hx renal transplant, adm w osteomyelitis, and E coli urosepsis w positive urine and blood cultures  Hgb anemia hgb 6.8  w/u---  no iron, B12 folate deficiency  etiology anemia due to chronic ds, acute illness, inflammation, sepsis  SIFE weak IgM lambda monoclonal gammopathy elevated k, l nad k/l ratio, normal IgA/M/G,   5cm liver mass--Prior known, s/p liver bx at [facility], wife brought in report which shows 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 also noted  14cm splenomegaly since 2018  -repeat MRI abdomen-6.6cm liver mass, 17.7cm splenomegaly, partially visualized retroperitoneal soft tissue encasing aorta and IVC      -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

## 2024-12-16 NOTE — CHART NOTE - NSCHARTNOTEFT_GEN_A_CORE
RN called requesting hold on AM Lokelma in light of patient being NPO due to scheduled liver biopsy this AM. Pt's chart reviewed, Lokelma ordered per nephro due to hyperkalemia on admission.    Plan:  - Provider to RN order to hold AM Lokelma dose.  - Primary team to reschedule today's Lokelma dose to be administered post biopsy.  - Consider reviewing AM BMP to ensure patient's potassium is within normal limits.

## 2024-12-16 NOTE — PROGRESS NOTE ADULT - SUBJECTIVE AND OBJECTIVE BOX
[INTERVAL HX: ]  Patient seen and examined;  Chart reviewed and events noted;     [MEDICATIONS]  MEDICATIONS  (STANDING):  amLODIPine   Tablet 10 milliGRAM(s) Oral daily  ascorbic acid 500 milliGRAM(s) Oral two times a day  azaTHIOprine 50 milliGRAM(s) Oral two times a day  buPROPion XL (24-Hour) . 300 milliGRAM(s) Oral daily  carvedilol 12.5 milliGRAM(s) Oral every 12 hours  cloNIDine 0.1 milliGRAM(s) Oral three times a day  dextrose 5% + sodium chloride 0.9%. 1000 milliLiter(s) (50 mL/Hr) IV Continuous <Continuous>  dextrose 5%. 1000 milliLiter(s) (100 mL/Hr) IV Continuous <Continuous>  dextrose 5%. 1000 milliLiter(s) (50 mL/Hr) IV Continuous <Continuous>  dextrose 50% Injectable 25 Gram(s) IV Push once  dextrose 50% Injectable 12.5 Gram(s) IV Push once  dextrose 50% Injectable 25 Gram(s) IV Push once  escitalopram 10 milliGRAM(s) Oral <User Schedule>  ferrous    sulfate 325 milliGRAM(s) Oral two times a day  glucagon  Injectable 1 milliGRAM(s) IntraMuscular once  hydrALAZINE 100 milliGRAM(s) Oral three times a day  insulin glargine Injectable (LANTUS) 30 Unit(s) SubCutaneous at bedtime  insulin lispro (ADMELOG) corrective regimen sliding scale   SubCutaneous three times a day before meals  insulin lispro Injectable (ADMELOG) 7 Unit(s) SubCutaneous three times a day before meals  levothyroxine 88 MICROGram(s) Oral <User Schedule>  mineral oil enema 133 milliLiter(s) Rectal once  pamidronate IVPB 60 milliGRAM(s) IV Intermittent once  pantoprazole    Tablet 40 milliGRAM(s) Oral two times a day  polyethylene glycol 3350 17 Gram(s) Oral daily  predniSONE   Tablet 20 milliGRAM(s) Oral daily  pyridoxine 50 milliGRAM(s) Oral daily  rosuvastatin 10 milliGRAM(s) Oral at bedtime  senna 2 Tablet(s) Oral at bedtime  tacrolimus 1 milliGRAM(s) Oral at bedtime  tacrolimus 2 milliGRAM(s) Oral daily    MEDICATIONS  (PRN):  aluminum hydroxide/magnesium hydroxide/simethicone Suspension 30 milliLiter(s) Oral every 4 hours PRN Dyspepsia  dextrose Oral Gel 15 Gram(s) Oral once PRN Blood Glucose LESS THAN 70 milliGRAM(s)/deciliter  hydrALAZINE Injectable 10 milliGRAM(s) IV Push every 8 hours PRN SBP >180 and HR 60-70bpm  melatonin 3 milliGRAM(s) Oral at bedtime PRN Insomnia  metoprolol tartrate Injectable 5 milliGRAM(s) IV Push every 6 hours PRN SBP >170  ondansetron Injectable 4 milliGRAM(s) IV Push every 8 hours PRN Nausea and/or Vomiting      [VITALS]  Vital Signs Last 24 Hrs  T(C): 36.5 (16 Dec 2024 11:48), Max: 37.1 (16 Dec 2024 04:57)  T(F): 97.7 (16 Dec 2024 11:48), Max: 98.7 (16 Dec 2024 04:57)  HR: 58 (16 Dec 2024 15:01) (57 - 63)  BP: 103/52 (16 Dec 2024 15:01) (94/52 - 164/63)  BP(mean): --  RR: 15 (16 Dec 2024 15:01) (12 - 20)  SpO2: 98% (16 Dec 2024 15:01) (96% - 100%)    Parameters below as of 16 Dec 2024 15:01  Patient On (Oxygen Delivery Method): room air      [WT/HT]  Daily Height in cm: 162.6 (16 Dec 2024 11:31)    Daily   [VENT]      [PHYSICAL EXAM]  GEN: NAD  HEENT: normocephalic and atraumatic. EOMI. PERRL.    NECK: Supple.  No lymphadenopathy   LUNGS: Clear to auscultation.  HEART: Regular rate and rhythm,  no MRG  ABDOMEN: Soft, nontender, and nondistended.  Positive bowel sounds.    : No CVA tenderness  EXTREMITIES: Without edema.  NEUROLOGIC: grossly intact.  PSYCHIATRIC: Appropriate affect .  SKIN: No rash     [LABS:]                        8.5    4.52  )-----------( 336      ( 16 Dec 2024 09:15 )             26.3     12-16    135  |  103  |  34[H]  ----------------------------<  117[H]  4.7   |  25  |  0.87    Ca    11.3[H]      16 Dec 2024 09:15    TPro  6.9  /  Alb  2.1[L]  /  TBili  0.3  /  DBili  x   /  AST  30  /  ALT  30  /  AlkPhos  111  12-16          Protein Electrophoresis, Urine (12-07-24 @ 05:00)  Immunofixation, Urine: Weak Bence Hernandez protein Kappa type  Urine Electrophoresis Interpretation: Weak Beta-Migrating Paraprotein Identified    Iron - Total Binding Capacity.: 182 ug/dL *L* [220 - 430] (12-05-24 @ 10:00)    Ferritin: 973 ng/mL *H* [30 - 400] (12-05-24 @ 10:00)    Serum Protein Electrophoresis Interp: Duplicate Sample (12-04-24 @ 16:06)    Serum Protein Electrophoresis Interp: Weak Beta Migrating Paraprotein Identified (12-02-24 @ 11:50)    Ferritin: 885 ng/mL *H* [30 - 400] (12-01-24 @ 05:55)    Iron - Total Binding Capacity.: 164 ug/dL *L* [220 - 430] (12-01-24 @ 05:55)    Serum Protein Electrophoresis Interp: Weak Beta Migrating Paraprotein Identified (11-29-24 @ 17:10)    Immunofixation, Serum:   Weak IgM Lambda Band Identified      Reference Range: None Detected (11-29-24 @ 17:10)    Sedimentation Rate, Erythrocyte: 117 mm/hr *H* [0 - 20] (11-29-24 @ 16:05)      Urinalysis Basic - ( 16 Dec 2024 09:15 )    Color: x / Appearance: x / SG: x / pH: x  Gluc: 117 mg/dL / Ketone: x  / Bili: x / Urobili: x   Blood: x / Protein: x / Nitrite: x   Leuk Esterase: x / RBC: x / WBC x   Sq Epi: x / Non Sq Epi: x / Bacteria: x                [RADIOLOGY STUDIES:]     [INTERVAL HX: ]  Patient seen and examined;  Chart reviewed and events noted;     s/p Liver bx today. site SCID, no ecchymosis  denies fever  no CP, no SOB  no abd pain      [MEDICATIONS]  MEDICATIONS  (STANDING):  amLODIPine   Tablet 10 milliGRAM(s) Oral daily  ascorbic acid 500 milliGRAM(s) Oral two times a day  azaTHIOprine 50 milliGRAM(s) Oral two times a day  buPROPion XL (24-Hour) . 300 milliGRAM(s) Oral daily  carvedilol 12.5 milliGRAM(s) Oral every 12 hours  cloNIDine 0.1 milliGRAM(s) Oral three times a day  dextrose 5% + sodium chloride 0.9%. 1000 milliLiter(s) (50 mL/Hr) IV Continuous <Continuous>  dextrose 5%. 1000 milliLiter(s) (100 mL/Hr) IV Continuous <Continuous>  dextrose 5%. 1000 milliLiter(s) (50 mL/Hr) IV Continuous <Continuous>  dextrose 50% Injectable 25 Gram(s) IV Push once  dextrose 50% Injectable 12.5 Gram(s) IV Push once  dextrose 50% Injectable 25 Gram(s) IV Push once  escitalopram 10 milliGRAM(s) Oral <User Schedule>  ferrous    sulfate 325 milliGRAM(s) Oral two times a day  glucagon  Injectable 1 milliGRAM(s) IntraMuscular once  hydrALAZINE 100 milliGRAM(s) Oral three times a day  insulin glargine Injectable (LANTUS) 30 Unit(s) SubCutaneous at bedtime  insulin lispro (ADMELOG) corrective regimen sliding scale   SubCutaneous three times a day before meals  insulin lispro Injectable (ADMELOG) 7 Unit(s) SubCutaneous three times a day before meals  levothyroxine 88 MICROGram(s) Oral <User Schedule>  mineral oil enema 133 milliLiter(s) Rectal once  pamidronate IVPB 60 milliGRAM(s) IV Intermittent once  pantoprazole    Tablet 40 milliGRAM(s) Oral two times a day  polyethylene glycol 3350 17 Gram(s) Oral daily  predniSONE   Tablet 20 milliGRAM(s) Oral daily  pyridoxine 50 milliGRAM(s) Oral daily  rosuvastatin 10 milliGRAM(s) Oral at bedtime  senna 2 Tablet(s) Oral at bedtime  tacrolimus 1 milliGRAM(s) Oral at bedtime  tacrolimus 2 milliGRAM(s) Oral daily    MEDICATIONS  (PRN):  aluminum hydroxide/magnesium hydroxide/simethicone Suspension 30 milliLiter(s) Oral every 4 hours PRN Dyspepsia  dextrose Oral Gel 15 Gram(s) Oral once PRN Blood Glucose LESS THAN 70 milliGRAM(s)/deciliter  hydrALAZINE Injectable 10 milliGRAM(s) IV Push every 8 hours PRN SBP >180 and HR 60-70bpm  melatonin 3 milliGRAM(s) Oral at bedtime PRN Insomnia  metoprolol tartrate Injectable 5 milliGRAM(s) IV Push every 6 hours PRN SBP >170  ondansetron Injectable 4 milliGRAM(s) IV Push every 8 hours PRN Nausea and/or Vomiting      [VITALS]  Vital Signs Last 24 Hrs  T(C): 36.5 (16 Dec 2024 11:48), Max: 37.1 (16 Dec 2024 04:57)  T(F): 97.7 (16 Dec 2024 11:48), Max: 98.7 (16 Dec 2024 04:57)  HR: 58 (16 Dec 2024 15:01) (57 - 63)  BP: 103/52 (16 Dec 2024 15:01) (94/52 - 164/63)  BP(mean): --  RR: 15 (16 Dec 2024 15:01) (12 - 20)  SpO2: 98% (16 Dec 2024 15:01) (96% - 100%)    Parameters below as of 16 Dec 2024 15:01  Patient On (Oxygen Delivery Method): room air      [WT/HT]  Daily Height in cm: 162.6 (16 Dec 2024 11:31)    Daily   [VENT]      [PHYSICAL EXAM]  GEN: NAD  HEENT: normocephalic and atraumatic. EOMI. .    NECK: Supple.  No lymphadenopathy   LUNGS: Clear to auscultation.  HEART: Regular rate and rhythm,  no MRG  ABDOMEN: Soft, nontender, and nondistended.  Positive bowel sounds.    : No CVA tenderness  EXTREMITIES: Without edema.  NEUROLOGIC: grossly intact.  PSYCHIATRIC: Appropriate affect .  SKIN: No rash     [LABS:]                        8.5    4.52  )-----------( 336      ( 16 Dec 2024 09:15 )             26.3     12-16    135  |  103  |  34[H]  ----------------------------<  117[H]  4.7   |  25  |  0.87    Ca    11.3[H]      16 Dec 2024 09:15    TPro  6.9  /  Alb  2.1[L]  /  TBili  0.3  /  DBili  x   /  AST  30  /  ALT  30  /  AlkPhos  111  12-16          Protein Electrophoresis, Urine (12-07-24 @ 05:00)  Immunofixation, Urine: Weak Bence Hernandez protein Kappa type  Urine Electrophoresis Interpretation: Weak Beta-Migrating Paraprotein Identified    Iron - Total Binding Capacity.: 182 ug/dL *L* [220 - 430] (12-05-24 @ 10:00)    Ferritin: 973 ng/mL *H* [30 - 400] (12-05-24 @ 10:00)    Serum Protein Electrophoresis Interp: Duplicate Sample (12-04-24 @ 16:06)    Serum Protein Electrophoresis Interp: Weak Beta Migrating Paraprotein Identified (12-02-24 @ 11:50)    Ferritin: 885 ng/mL *H* [30 - 400] (12-01-24 @ 05:55)    Iron - Total Binding Capacity.: 164 ug/dL *L* [220 - 430] (12-01-24 @ 05:55)    Serum Protein Electrophoresis Interp: Weak Beta Migrating Paraprotein Identified (11-29-24 @ 17:10)    Immunofixation, Serum:   Weak IgM Lambda Band Identified      Reference Range: None Detected (11-29-24 @ 17:10)    Sedimentation Rate, Erythrocyte: 117 mm/hr *H* [0 - 20] (11-29-24 @ 16:05)      Urinalysis Basic - ( 16 Dec 2024 09:15 )    Color: x / Appearance: x / SG: x / pH: x  Gluc: 117 mg/dL / Ketone: x  / Bili: x / Urobili: x   Blood: x / Protein: x / Nitrite: x   Leuk Esterase: x / RBC: x / WBC x   Sq Epi: x / Non Sq Epi: x / Bacteria: x                [RADIOLOGY STUDIES:]

## 2024-12-16 NOTE — PROGRESS NOTE ADULT - SUBJECTIVE AND OBJECTIVE BOX
[facility] -- Tom Rahman, Osorio Castillo Savella, , Lisa Covington  Office # [phone]    Follow Up:   Uncontrolled HTN, Pericardial Effusion     Subjective/Observations: Awake and alert.  Comfortable on RA.  Denies any form of pain, respiratory or cardiac discomfort.      REVIEW OF SYSTEMS: All other review of systems is negative unless indicated above  PAST MEDICAL & SURGICAL HISTORY:  Diabetes Mellitus Type II  Insulin pump (2010)      GERD (gastroesophageal reflux disease)      Depression      Hypothyroidism      Hyperlipidemia      Kidney transplanted  2012      Hypertension      ANGELIKA (obstructive sleep apnea)      Peripheral neuropathy      Shoulder fracture  Left.      History of colonoscopy      S/P kidney transplant  2012      S/P cholecystectomy      Retroperitoneal fibrosis  s/p surgery      AV fistula  Right arm      S/P right cataract extraction      S/P left cataract extraction      H/O unilateral nephrectomy  Left        MEDICATIONS  (STANDING):  amLODIPine   Tablet 10 milliGRAM(s) Oral daily  ascorbic acid 500 milliGRAM(s) Oral two times a day  azaTHIOprine 50 milliGRAM(s) Oral two times a day  buPROPion XL (24-Hour) . 300 milliGRAM(s) Oral daily  carvedilol 12.5 milliGRAM(s) Oral every 12 hours  cloNIDine 0.1 milliGRAM(s) Oral three times a day  dextrose 5% + sodium chloride 0.9%. 1000 milliLiter(s) (50 mL/Hr) IV Continuous <Continuous>  dextrose 5%. 1000 milliLiter(s) (50 mL/Hr) IV Continuous <Continuous>  dextrose 5%. 1000 milliLiter(s) (100 mL/Hr) IV Continuous <Continuous>  dextrose 50% Injectable 25 Gram(s) IV Push once  dextrose 50% Injectable 12.5 Gram(s) IV Push once  dextrose 50% Injectable 25 Gram(s) IV Push once  escitalopram 10 milliGRAM(s) Oral <User Schedule>  ferrous    sulfate 325 milliGRAM(s) Oral two times a day  glucagon  Injectable 1 milliGRAM(s) IntraMuscular once  hydrALAZINE 100 milliGRAM(s) Oral three times a day  insulin glargine Injectable (LANTUS) 30 Unit(s) SubCutaneous at bedtime  insulin lispro (ADMELOG) corrective regimen sliding scale   SubCutaneous three times a day before meals  insulin lispro Injectable (ADMELOG) 7 Unit(s) SubCutaneous three times a day before meals  levothyroxine 88 MICROGram(s) Oral <User Schedule>  lisinopril 40 milliGRAM(s) Oral daily  mineral oil enema 133 milliLiter(s) Rectal once  pantoprazole    Tablet 40 milliGRAM(s) Oral two times a day  polyethylene glycol 3350 17 Gram(s) Oral daily  predniSONE   Tablet 20 milliGRAM(s) Oral daily  pyridoxine 50 milliGRAM(s) Oral daily  rosuvastatin 10 milliGRAM(s) Oral at bedtime  senna 2 Tablet(s) Oral at bedtime  tacrolimus 1 milliGRAM(s) Oral at bedtime  tacrolimus 2 milliGRAM(s) Oral daily    MEDICATIONS  (PRN):  aluminum hydroxide/magnesium hydroxide/simethicone Suspension 30 milliLiter(s) Oral every 4 hours PRN Dyspepsia  dextrose Oral Gel 15 Gram(s) Oral once PRN Blood Glucose LESS THAN 70 milliGRAM(s)/deciliter  hydrALAZINE Injectable 10 milliGRAM(s) IV Push every 8 hours PRN SBP >180 and HR 60-70bpm  melatonin 3 milliGRAM(s) Oral at bedtime PRN Insomnia  metoprolol tartrate Injectable 5 milliGRAM(s) IV Push every 6 hours PRN SBP >170  ondansetron Injectable 4 milliGRAM(s) IV Push every 8 hours PRN Nausea and/or Vomiting    Allergies    No Known Allergies    Intolerances      Vital Signs Last 24 Hrs  T(C): 37.1 (16 Dec 2024 04:57), Max: 37.1 (16 Dec 2024 04:57)  T(F): 98.7 (16 Dec 2024 04:57), Max: 98.7 (16 Dec 2024 04:57)  HR: 61 (16 Dec 2024 04:57) (61 - 63)  BP: 134/53 (16 Dec 2024 04:57) (114/63 - 164/63)  BP(mean): --  RR: 18 (16 Dec 2024 04:57) (18 - 18)  SpO2: 98% (16 Dec 2024 04:57) (95% - 98%)    Parameters below as of 16 Dec 2024 04:57  Patient On (Oxygen Delivery Method): room air      I&O's Summary      PHYSICAL EXAM:  TELE: Not on tele  Constitutional: NAD, awake and alert  HEENT: Moist Mucous Membranes, Anicteric  Pulmonary: Non-labored, breath sounds are clear bilaterally, No wheezing, rales or rhonchi  Cardiovascular: Regular, S1 and S2, No murmurs, rubs, gallops or clicks  Gastrointestinal: Bowel Sounds present, soft, nontender.   Lymph: No peripheral edema. No lymphadenopathy.  Skin: No visible rashes or ulcers.  Psych:  Mood & affect: Flat  LABS: All Labs Reviewed:                        7.8    3.94  )-----------( 368      ( 15 Dec 2024 05:46 )             23.4                         8.4    4.37  )-----------( 442      ( 14 Dec 2024 07:01 )             25.9     15 Dec 2024 05:46    133    |  101    |  37     ----------------------------<  227    4.5     |  29     |  0.92   14 Dec 2024 07:01    132    |  100    |  41     ----------------------------<  316    4.7     |  27     |  1.10     Ca    11.7       15 Dec 2024 05:46  Ca    11.2       14 Dec 2024 07:01    12 Lead ECG:   Ventricular Rate 81 BPM    Atrial Rate 81 BPM    P-R Interval 148 ms    QRS Duration 104 ms    Q-T Interval 406 ms    QTC Calculation(Bazett) 471 ms    P Axis 44 degrees    R Axis 12 degrees    T Axis 53 degrees    Diagnosis Line Normal sinus rhythm  Normal ECG  When compared with ECG of 29-NOV-2024 07:11,  Nonspecific T wave abnormality, improved in Lateral leads  Confirmed by Kya Gonzalez ([id number]) on 12/1/2024 10:43:18 AM (12-01-24 @ 09:28)    TRANSTHORACIC ECHOCARDIOGRAM REPORT  ________________________________________________________________________________                                      _______  Pt. Name:       JAN CALVIN Study Date:    11/29/2024  MRN:            [id number]          YOB: 1955  Accession #:    [id number]         Age:           69 years  Account#:       [id number]        Gender:        M  Heart Rate:                       Height:        64.17 in (163.00 cm)  Rhythm:       Weight:        169.75 lb (77.00 kg)  Blood Pressure: 196/81 mmHg       BSA/BMI:       1.83 m² / 28.98 kg/m²  ________________________________________________________________________________________  Referring Physician:    [id number] Ale Ibrahim  Interpreting Physician: Kya Gonzalez MD  Primary Sonographer:    Butch Salazar    CPT:               ECHO TTE WO CON COMP W DOPP - 48418.m  Indication(s):     Cardiac murmur, unspecified - R01.1  Procedure:         Transthoracic echocardiogram with 2-D, M-mode and complete                     spectral and color flow Doppler.  Ordering Location: [facility]  Admission Status:  ED    _______________________________________________________________________________________     CONCLUSIONS:      1. Left ventricular cavity is normal in size. Left ventricular systolic function is normal with an ejection fraction of 60 % by Moreno's method of disks.   2. Trace pericardial effusion noted adjacent to the posterior left ventricle.   3. Normal right ventricular cavity size and normal right ventricular systolic function.   4. Aortic valve anatomy cannot be determined with normal systolic excursion. There is calcification of the aortic valve leaflets.   5. Structurally normal mitral valve with normalleaflet excursion.   6. Structurally normal tricuspid valve with normal leaflet excursion. Trace tricuspid regurgitation.   7. The inferior vena cava is normal in size measuring 1.36 cm in diameter, (normal <2.1cm) with normal inspiratory collapse (normal >50%) consistent with normal right atrial pressure (~3, range 0-5mmHg).    ________________________________________________________________________________________  FINDINGS:     Left Ventricle:  The left ventricular cavity is normal in size. Left ventricular systolic function is normal with a calculated ejection fraction of 60 % by the Moreno's biplane method of disks.     Right Ventricle:  The right ventricular cavity is normal in size and right ventricular systolic function is normal. Tricuspid annular plane systolic excursion (TAPSE) is 2.9 cm (normal >=1.7 cm).     Left Atrium:  The left atrium is normal in size with an indexed volume of 33.15 ml/m².     Right Atrium:  The right atrium is normal in size with an indexed volume of 21.17 ml/m².     Interatrial Septum:  The interatrial septum appears intact.     Aortic Valve:  The aortic valve anatomy cannot be determined with normal systolic excursion. There is calcification of the aortic valve leaflets. The peak transaortic velocity is 2.15 m/s, peak transaortic gradient is 18.5 mmHg and mean transaortic gradient is 11.0 mmHg with an LVOT/aortic valve VTI ratio of 0.64. The aortic valve area is estimated at 2.67 cm² by the continuity equation. There is no evidence of aortic regurgitation.     Mitral Valve:  Structurally normal mitral valve with normal leaflet excursion. There is calcification of the mitral valve annulus.     Tricuspid Valve:  Structurally normal tricuspid valve with normal leaflet excursion. There is trace tricuspid regurgitation. Estimated pulmonary artery systolic pressure is 8 mmHg.     Aorta:  The aortic root at the sinuses of Valsalva is normal in size, measuring 3.50 cm (indexed 1.91 cm/m²). The ascending aorta is dilated, measuring 4.10 cm (indexed 2.24 cm/m²).     Pericardium:  There is a trace pericardial effusion noted adjacent to the posterior left ventricle.     Systemic Veins:  The inferior vena cava is normal in size measuring 1.36 cm in diameter, (normal <2.1cm) with normal inspiratory collapse (normal >50%) consistent with normal right atrial pressure (~3, range 0-5mmHg).  ____________________________________________________________________  QUANTITATIVE DATA:  Left Ventricle Measurements: (Indexed to BSA)     IVSd (2D):   1.0 cm  LVPWd (2D):  1.0 cm  LVIDd (2D):  5.3 cm  LVIDs (2D):  3.6 cm  LV Mass:     203 g  111.2 g/m²  LV Vol d, MOD A2C: 97.8 ml  53.50 ml/m²  LV Vol d, MOD A4C: 121.0 ml 66.19 ml/m²  LV Vol d, MOD BP:  109.5 ml 59.90 ml/m²  LV Vol s, MOD A2C: 36.4 ml  19.91 ml/m²  LV Vol s, MOD A4C: 49.5 ml  27.08 ml/m²  LV Vol s, MOD BP:  44.0 ml  24.04 ml/m²  LVOT SV MOD BP:    65.5 ml  LV EF% MOD BP:     60 %     MV E Vmax:    1.02 m/s  MV A Vmax:    0.93 m/s  MV E/A:       1.10  e' lateral:   13.10 cm/s  e' medial:    9.25 cm/s  E/e' lateral: 7.79  E/e' medial:  11.03  E/e' Average: 9.13  MV DT:        151 msec    Aorta Measurements: (Normal range) (Indexed to BSA)     Ao Root d     3.50 cm (3.1 - 3.7 cm) 1.91 cm/m²  Ao Asc d, 2D: 4.10  Ao Asc prox:  4.10 cm               2.24 cm/m²            Left Atrium Measurements: (Indexed to BSA)  LA Diam 2D: 4.40 cm         Right Ventricle Measurements: Right Atrial Measurements:     TAPSE:            2.9 cm      RA Vol:            38.70 ml  RV Base (RVID1):  3.7cm      RA Vol s, MOD A4C  38.7 ml  RV Mid (RVID2):   3.1 cm      RA Vol Index:      21.17 ml/m²  RV Major (RVID3): 8.0 cm      RA Area s, MOD A4C 14.7 cm²       LVOT / RVOT/ Qp/Qs Data: (Indexed to BSA)  LVOT Diameter:  2.30 cm  LVOT Area:      4.15cm²  LVOT Vmax:      1.34 m/s  LVOT Vmn:       0.949 m/s  LVOT VTI:       26.30 cm  LVOT peak grad: 7 mmHg  LVOT mean grad: 4.0 mmHg  LVOT SV:        109.3 ml  59.78 ml/m²    Aortic Valve Measurements:  AV Vmax:                2.2 m/s  AV Peak Gradient:       18.5 mmHg  AV Mean Gradient:       11.0 mmHg  AV VTI:                 41.0 cm  AV VTI Ratio:           0.64  AoV EOA, Contin:        2.67 cm²  AoV EOA, Contin i:      1.46 cm²/m²  AoV Dimensionless Index 0.64    Mitral Valve Measurements:     MV E Vmax: 1.0 m/s  MV A Vmax: 0.9 m/s  MV E/A:    1.1     Tricuspid Valve Measurements:     TR Vmax:          1.2 m/s  TR Peak Gradient: 5.3 mmHg  RA Pressure:      3 mmHg  PASP:             8 mmHg    ________________________________________________________________________________________  Electronically signed on 11/30/2024 at 1:57:15 PM by Kya Gonzalez MD         *** Final ***

## 2024-12-16 NOTE — PRE PROCEDURE NOTE - PRE PROCEDURE EVALUATION
Interventional Radiology    HPI: 69y Male with PMHx DM, HTN, HLD, h/o kidney transplant, hypothyroidism, presented to ED from [facility] for AMS likely  acute  metabolic encephalopathy.   CT abd with 5.5cm lesion on R hepatic lobe also noticed  retroperitoneal  fibrosis  and spleen mass. Reviewed hospitalist note, "histopath not fully reported by [facility] in sept /2024 paper work; hem/onc  DR PARSON / and nephro dr vizcarra   recommended repeat biopsy; JOCELYNN Hardy; Patient is medically optimized for the procedure."  Presents to IR for liver mass biopsy.    Allergies: No Known Allergies    Medications (Abx/Cardiac/Anticoagulation/Blood Products)  amLODIPine   Tablet: 10 milliGRAM(s) Oral (12-16 @ 05:17)  carvedilol: 12.5 milliGRAM(s) Oral (12-16 @ 05:17)  cloNIDine: 0.1 milliGRAM(s) Oral (12-16 @ 05:17)  hydrALAZINE: 100 milliGRAM(s) Oral (12-16 @ 05:18)  lisinopril: 40 milliGRAM(s) Oral (12-16 @ 05:18)    Data:    T(C): 37.1  HR: 61  BP: 134/53  RR: 18  SpO2: 98%    Exam  General: No acute distress  Chest: Non labored breathing  Abdomen: Non-distended  Extremities: No swelling, warm    -WBC 4.52 / HgB 8.5 / Hct 26.3 / Plt 336  -Na 135 / Cl 103 / BUN 34 / Glucose 117  -K 4.7 / CO2 25 / Cr 0.87  -ALT 30 / Alk Phos 111 / T.Bili 0.3    Imaging: reviewed by Dr. Escudero    Plan: 69y Male presents for liver mass biopsy  -Risks/Benefits/alternatives explained with the patient and/or healthcare proxy and witnessed informed consent obtained.

## 2024-12-16 NOTE — PROGRESS NOTE ADULT - ASSESSMENT
CKD 3, h/o Kidney transplant ~2012, h/o Left Nephrectomy  Diabetes  Hypertension  Sepsis, UTI, Sacral osteomyelitis, Gram negative bacteremia  Hypokalemia  Hypercalcemia, Elevated 1,25 Vitamin D (ACEI level: WNL), R/o Lymphoma  Hypomagnesemia  Hypophosphatemia  Anemia  + Liver mass    Stable renal indices. To continue current immuno suppressants. Calcium levels remain elevated. Will redose aredia.   Monitor blood sugar levels. Insulin coverage as needed. Dietary restriction. Low BP. Lower lisinopril. Trend BP and titrate BP meds as needed.   Trend BP and titrate BP meds as needed. Hematology follow up. Will follow electrolytes and renal function trend. For liver biopsy.

## 2024-12-16 NOTE — PROGRESS NOTE ADULT - ASSESSMENT
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  - CBC noted, transfuse PRN   - PPI for ppx  - Monitor for any overt GI bleeding  - Outside path noted  - ?component of AIH    I reviewed the overnight course of events on the unit, re-confirming the patient history. I discussed the care with the patient.  Differential diagnosis and plan of care discussed with patient after the evaluation.  35 minutes spent on total encounter of which more than fifty percent of the encounter was spent counseling and/or coordinating care by the attending physician. 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  - CBC noted, transfuse PRN   - PPI for ppx  - Monitor for any overt GI bleeding  - Outside path noted  - ?component of AIH    I reviewed the overnight course of events on the unit, re-confirming the patient history. I discussed the care with the patient.  Differential diagnosis and plan of care discussed with patient after the evaluation.  35 minutes spent on total encounter of which more than fifty percent of the encounter was spent counseling and/or coordinating care by the attending physician.

## 2024-12-16 NOTE — PROGRESS NOTE ADULT - ASSESSMENT
70yo M, PMH DM, HTN, HLD, h/o kidney transplant, hypothyroidism, presents to ED from [facility] for AMS, found to be febrile with positive UA. 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 Cardiology called for pericardial effusion.     Uncontrolled HTN/Pericardial Effusion  - CT chest: Small left pleural effusion with small loculated components and small to moderate pericardial effusion  - TTE: EF 60%, trace pericardial effusion adjacent to the posterior LV.  No tamponade physiology.  No significant valvular disease  - No evidence of any meaningful volume overload.  Non-orthopneic on RA    - EKG with nonspecific TWI anteriorly, repeat unchanged  - No evidence of any active ischemia   - Continue statin   - Continue to hold ASA in setting of persistent anemia, though, neg FOBT.  s/p PRBC's for Hgb of 6.5 (12/10).  There appears to be no clear indication for it  - Continue to trend and transfuse per primary   - Heme and GI following for anemia.  With liver mass on MRI, planned for liver Bx via IR today, 12/16.  No contraindication from cardiac standpoint given electrolytes WNL.  Remains stable from cardiac standpoint    - BP's now mostly stable with isolated elevation, likely, an outlier  - Continue Norvasc 10mg and Lisinopril 40 mg daily  - Continue Clonidine 0.1 mg PO TID and Coreg 12.5mg BID   - Continue Hydralazine but would reduce to 50 mg q8H if SBP stable     - Ortho following for pathological sacral Fx with possible OM.   - No acute orthopedic surgical intervention at this time.    - Monitor and replete Lytes. Keep K > 4 and Mg > 2  - Will continue to follow.    Oralia Mendoza DNP, NP-C, AGACNP-C  Cardiology   Call TEAMS

## 2024-12-16 NOTE — PROGRESS NOTE ADULT - SUBJECTIVE AND OBJECTIVE BOX
[facility]    Remi Sampson NP    [address] [place] [address]  [phone]      Chief Complaint:  Patient is a 69y old  Male who presents with a chief complaint of AMS (16 Dec 2024 09:49)      HPI/ 24 hr events:   Patient seen and examined at bedside  Reports feeling well this morning   No acute GI complaints        REVIEW OF SYSTEMS:   General: Negative  HEENT: Negative  CV: Negative  Respiratory: Negative  GI: See HPI  : Negative  MSK: Negative  Hematologic: Negative  Skin: Negative    MEDICATIONS:   MEDICATIONS  (STANDING):  amLODIPine   Tablet 10 milliGRAM(s) Oral daily  ascorbic acid 500 milliGRAM(s) Oral two times a day  azaTHIOprine 50 milliGRAM(s) Oral two times a day  buPROPion XL (24-Hour) . 300 milliGRAM(s) Oral daily  carvedilol 12.5 milliGRAM(s) Oral every 12 hours  cloNIDine 0.1 milliGRAM(s) Oral three times a day  dextrose 5% + sodium chloride 0.9%. 1000 milliLiter(s) (50 mL/Hr) IV Continuous <Continuous>  dextrose 5%. 1000 milliLiter(s) (50 mL/Hr) IV Continuous <Continuous>  dextrose 5%. 1000 milliLiter(s) (100 mL/Hr) IV Continuous <Continuous>  dextrose 50% Injectable 25 Gram(s) IV Push once  dextrose 50% Injectable 12.5 Gram(s) IV Push once  dextrose 50% Injectable 25 Gram(s) IV Push once  escitalopram 10 milliGRAM(s) Oral <User Schedule>  ferrous    sulfate 325 milliGRAM(s) Oral two times a day  glucagon  Injectable 1 milliGRAM(s) IntraMuscular once  hydrALAZINE 100 milliGRAM(s) Oral three times a day  insulin glargine Injectable (LANTUS) 30 Unit(s) SubCutaneous at bedtime  insulin lispro (ADMELOG) corrective regimen sliding scale   SubCutaneous three times a day before meals  insulin lispro Injectable (ADMELOG) 7 Unit(s) SubCutaneous three times a day before meals  levothyroxine 88 MICROGram(s) Oral <User Schedule>  lisinopril 40 milliGRAM(s) Oral daily  mineral oil enema 133 milliLiter(s) Rectal once  pantoprazole    Tablet 40 milliGRAM(s) Oral two times a day  polyethylene glycol 3350 17 Gram(s) Oral daily  predniSONE   Tablet 20 milliGRAM(s) Oral daily  pyridoxine 50 milliGRAM(s) Oral daily  rosuvastatin 10 milliGRAM(s) Oral at bedtime  senna 2 Tablet(s) Oral at bedtime  tacrolimus 1 milliGRAM(s) Oral at bedtime  tacrolimus 2 milliGRAM(s) Oral daily    MEDICATIONS  (PRN):  aluminum hydroxide/magnesium hydroxide/simethicone Suspension 30 milliLiter(s) Oral every 4 hours PRN Dyspepsia  dextrose Oral Gel 15 Gram(s) Oral once PRN Blood Glucose LESS THAN 70 milliGRAM(s)/deciliter  hydrALAZINE Injectable 10 milliGRAM(s) IV Push every 8 hours PRN SBP >180 and HR 60-70bpm  melatonin 3 milliGRAM(s) Oral at bedtime PRN Insomnia  metoprolol tartrate Injectable 5 milliGRAM(s) IV Push every 6 hours PRN SBP >170  ondansetron Injectable 4 milliGRAM(s) IV Push every 8 hours PRN Nausea and/or Vomiting      ALLERGIES:   Allergies    No Known Allergies    Intolerances        VITAL SIGNS:   Vital Signs Last 24 Hrs  T(C): 37.1 (16 Dec 2024 04:57), Max: 37.1 (16 Dec 2024 04:57)  T(F): 98.7 (16 Dec 2024 04:57), Max: 98.7 (16 Dec 2024 04:57)  HR: 61 (16 Dec 2024 04:57) (61 - 63)  BP: 134/53 (16 Dec 2024 04:57) (114/63 - 164/63)  BP(mean): --  RR: 18 (16 Dec 2024 04:57) (18 - 18)  SpO2: 98% (16 Dec 2024 04:57) (95% - 98%)    Parameters below as of 16 Dec 2024 04:57  Patient On (Oxygen Delivery Method): room air      I&O's Summary      PHYSICAL EXAM:   GENERAL:  No acute distress  HEENT:  NC/AT  CHEST:  No increased effort  HEART:  Regular rate  ABDOMEN:  Soft, non-tender, non-distended  EXTREMITIES: No cyanosis  SKIN:  Warm, dry  NEURO:  Calm, cooperative    LABS:                        8.5    4.52  )-----------( 336      ( 16 Dec 2024 09:15 )             26.3     12-15    133[L]  |  101  |  37[H]  ----------------------------<  227[H]  4.5   |  29  |  0.92    Ca    11.7[H]      15 Dec 2024 05:46                                          RADIOLOGY & ADDITIONAL STUDIES:

## 2024-12-16 NOTE — CHART NOTE - NSCHARTNOTESELECT_GEN_ALL_CORE
Event Note
Event Note
Nutrition Services
Orthopedic Surgery
Event Note
Nutrition Services
Orthopedic Surgery
Orthopedic Surgery

## 2024-12-16 NOTE — CASE MANAGEMENT PROGRESS NOTE - NSCMPROGRESSNOTE_GEN_ALL_CORE
Discussed pt on rounds, pt remains acute, for liver bx today. CM will continue to collaborate with interdisciplinary team and remain available to assist.

## 2024-12-16 NOTE — PROGRESS NOTE ADULT - ASSESSMENT
Patient is a 70yo M, PMH DM, HTN, HLD, h/o kidney transplant, hypothyroidism, presents to ED from [facility] for AMS likely  acute  metabolic encephalopathy         Hepatic lesion :   - CT abd with 5.5cm lesion on R hepatic lobe also noticed  retroperitoneal  fibrosis  and spleen mass   - histopath not fully reported by  [facility] in sept /2024 paper work   - hem/onc  DR SAL / and nephro dr vizcarra   recommended repeat biopsy   - MRI abdomen reviewed, noted for hepatic lesion.   - D/w Onc, plan for IR guided biopsy Today  12/16/2024.  JOCELYNN Hardy   -Patient is medically optimized for the procedure       AMS 2/2 Sepsis 2/2 multiple infections (UTI, sacral infection, possible osteomyelitis), E coli bacteremia   acute  metabolic encephalopathy : Completed course of Antibiotics, At baseline Mental Status   Sepsis 2/2 ESBL Ecoli UTI and bacteremia. sensitive to ertapenem.  -Completed course of  Meropenum until 12/10  -Tylenol PRN pain and fever- diet as tolerated   -aspiration and fall risk - Continue Senna, Miralax, PRN dulcolax suppositories   -MR pelvis/sacrum to eval for osteomyelitis-  Again seen are comminuted bilateral sacral lona fractures with marked osseous edema and marked loss of normal T1 fatty marrow. Osseous edema with loss of normal T1 fatty marrow at the caudal aspect of the left posterior iliac bone adjacent to the sacroiliac joint. Previously seen fracture component is better visualized on CT. These findings could be secondary to extensive fracture deformities in an osteopenic patient versus osteomyelitis if there was a history of a soft tissue ulcer extending to bone versus metastatic disease given the marked loss of T1 fatty marrow if there is a history of malignancy. Clinical correlation with patient history is advised.  - Per Ortho no surgical intervention for fracture of sacrum  / needs dolores     Acute on Chronic anemia  - Likely due to infection,  and CKD- No signs of acute  bleeding noted   - Iron22/ sat low    iron deficiency with chr anemia  +  - S/p  1unit of pRBC today,  given hx of renal transplant will use irradiated PRBC   - Hem Dr. Sal following     Diabetes Mellitus / hyperglycemia  - A1c 7.4  - Added  Pre meal Insulin, continue Lantus. Will adjust doses as needed   - ISS  - carb controlled diet    HTN  - Continue Lisinopril 40 mg  daily   - Continue Hydralazine 100mg TID with hold parameters  - Continue Coreg, switched from Metoprolol - 12.5 mg po bid  - Continue Amlodipine 10mg daily -  - added on clonidine 0.1  mg tid   - fu bp closely     HLD  Continue Crestor 10mg daily    s/p Kidney transplant/CKD 3  - Continue Tacrolimus 2mg daily  - Continue Tacrolimus 1mg QHS  - Continue Azathioprine 50mg BID  - Nephrology consulted dr vizcarra     Hypothyroidism  - Continue Levothyroxine 88mcg QAM    Hypercalcemia  - possible  sec to  underlying  lymphoproliferative disorder  - sec to vit D  s/p  calcitonin   s/p   Aredia   - Calcitonin q 12 hrs X 3 doses  - Nephro following    Depression  Continue Escitalopram 10mg TID  Continue Bupropion 300mg daily        DVT ppx: Hold AC due to anemia and risk of bleeding         wife updated. Ludmila Gann [phone]. She agreed with Liver biopsy on Monday   Dispo: JOCELYNN bernal

## 2024-12-16 NOTE — PROCEDURE NOTE - PROCEDURE FINDINGS AND DETAILS
right liver lesion targeted for biopsy. 3 t8g cores, 2 in formalin and one in RPMI. Full report to follow.
___11__cm bard power midline inserted into patent left basilic vein under sterile conditions and ultrasound guidance.  Midline catheter can be accessed.

## 2024-12-16 NOTE — PROGRESS NOTE ADULT - SUBJECTIVE AND OBJECTIVE BOX
[facility]                                                       Dr. Bruce, Dr. Prabhakar, Dr. Cabrales, Dr. Zaragoza, Dr. Bingham, Dr. Loya                                      [facility], [facility], [address]                                                 [address] [address]                                      Ph: [phone]  Fax: [phone]                                         Ph: [phone]  Fax: [phone]      Patient is a 69y old  Male who presents with a chief complaint of AMS (01 Dec 2024 11:19)  Patient seen in follow up for CKD, h/o Kidney transplant.        PAST MEDICAL HISTORY:  Diabetes Mellitus Type II    ESRD on Dialysis    GERD (gastroesophageal reflux disease)    Depression    Hypothyroidism    Hyperlipidemia    Kidney transplanted    Hypertension    ANGELIKA (obstructive sleep apnea)    Peripheral neuropathy    Shoulder fracture      MEDICATIONS  (STANDING):  amLODIPine   Tablet 10 milliGRAM(s) Oral daily  ascorbic acid 500 milliGRAM(s) Oral two times a day  azaTHIOprine 50 milliGRAM(s) Oral two times a day  buPROPion XL (24-Hour) . 300 milliGRAM(s) Oral daily  carvedilol 12.5 milliGRAM(s) Oral every 12 hours  cloNIDine 0.1 milliGRAM(s) Oral three times a day  dextrose 5% + sodium chloride 0.9%. 1000 milliLiter(s) (50 mL/Hr) IV Continuous <Continuous>  dextrose 5%. 1000 milliLiter(s) (50 mL/Hr) IV Continuous <Continuous>  dextrose 5%. 1000 milliLiter(s) (100 mL/Hr) IV Continuous <Continuous>  dextrose 50% Injectable 25 Gram(s) IV Push once  dextrose 50% Injectable 12.5 Gram(s) IV Push once  dextrose 50% Injectable 25 Gram(s) IV Push once  escitalopram 10 milliGRAM(s) Oral <User Schedule>  ferrous    sulfate 325 milliGRAM(s) Oral two times a day  glucagon  Injectable 1 milliGRAM(s) IntraMuscular once  hydrALAZINE 100 milliGRAM(s) Oral three times a day  insulin glargine Injectable (LANTUS) 30 Unit(s) SubCutaneous at bedtime  insulin lispro (ADMELOG) corrective regimen sliding scale   SubCutaneous three times a day before meals  insulin lispro Injectable (ADMELOG) 7 Unit(s) SubCutaneous three times a day before meals  levothyroxine 88 MICROGram(s) Oral <User Schedule>  lisinopril 40 milliGRAM(s) Oral daily  mineral oil enema 133 milliLiter(s) Rectal once  pantoprazole    Tablet 40 milliGRAM(s) Oral two times a day  polyethylene glycol 3350 17 Gram(s) Oral daily  predniSONE   Tablet 20 milliGRAM(s) Oral daily  pyridoxine 50 milliGRAM(s) Oral daily  rosuvastatin 10 milliGRAM(s) Oral at bedtime  senna 2 Tablet(s) Oral at bedtime  tacrolimus 1 milliGRAM(s) Oral at bedtime  tacrolimus 2 milliGRAM(s) Oral daily    MEDICATIONS  (PRN):  aluminum hydroxide/magnesium hydroxide/simethicone Suspension 30 milliLiter(s) Oral every 4 hours PRN Dyspepsia  dextrose Oral Gel 15 Gram(s) Oral once PRN Blood Glucose LESS THAN 70 milliGRAM(s)/deciliter  hydrALAZINE Injectable 10 milliGRAM(s) IV Push every 8 hours PRN SBP >180 and HR 60-70bpm  melatonin 3 milliGRAM(s) Oral at bedtime PRN Insomnia  metoprolol tartrate Injectable 5 milliGRAM(s) IV Push every 6 hours PRN SBP >170  ondansetron Injectable 4 milliGRAM(s) IV Push every 8 hours PRN Nausea and/or Vomiting    T(C): 37.1 (12-16-24 @ 04:57), Max: 37.7 (12-15-24 @ 05:11)  HR: 60 (12-16-24 @ 11:18) (60 - 70)  BP: 103/52 (12-16-24 @ 11:18) (103/52 - 164/68)  RR: 20 (12-16-24 @ 11:18)  SpO2: 99% (12-16-24 @ 11:18)  Wt(kg): --  I&O's Detail                    PHYSICAL EXAM:  General: No distress  Respiratory: b/l air entry  Cardiovascular: S1 S2  Gastrointestinal: soft  Extremities:  no edema          LABORATORY:                        8.5    4.52  )-----------( 336      ( 16 Dec 2024 09:15 )             26.3     12-16    135  |  103  |  34[H]  ----------------------------<  117[H]  4.7   |  25  |  0.87    Ca    11.3[H]      16 Dec 2024 09:15    TPro  6.9  /  Alb  2.1[L]  /  TBili  0.3  /  DBili  x   /  AST  30  /  ALT  30  /  AlkPhos  111  12-16    Sodium: 135 mmol/L (12-16 @ 09:15)  Sodium: 133 mmol/L (12-15 @ 05:46)    Potassium: 4.7 mmol/L (12-16 @ 09:15)  Potassium: 4.5 mmol/L (12-15 @ 05:46)    Hemoglobin: 8.5 g/dL (12-16 @ 09:15)  Hemoglobin: 7.8 g/dL (12-15 @ 05:46)  Hemoglobin: 8.4 g/dL (12-14 @ 07:01)    Creatinine, Serum 0.87 (12-16 @ 09:15)  Creatinine, Serum 0.92 (12-15 @ 05:46)  Creatinine, Serum 1.10 (12-14 @ 07:01)        LIVER FUNCTIONS - ( 16 Dec 2024 09:15 )  Alb: 2.1 g/dL / Pro: 6.9 g/dL / ALK PHOS: 111 U/L / ALT: 30 U/L / AST: 30 U/L / GGT: x           Urinalysis Basic - ( 16 Dec 2024 09:15 )    Color: x / Appearance: x / SG: x / pH: x  Gluc: 117 mg/dL / Ketone: x  / Bili: x / Urobili: x   Blood: x / Protein: x / Nitrite: x   Leuk Esterase: x / RBC: x / WBC x   Sq Epi: x / Non Sq Epi: x / Bacteria: x

## 2024-12-17 LAB
ANION GAP SERPL CALC-SCNC: 4 MMOL/L — LOW (ref 5–17)
BUN SERPL-MCNC: 31 MG/DL — HIGH (ref 7–23)
CALCIUM SERPL-MCNC: 11.4 MG/DL — HIGH (ref 8.5–10.1)
CHLORIDE SERPL-SCNC: 104 MMOL/L — SIGNIFICANT CHANGE UP (ref 96–108)
CO2 SERPL-SCNC: 28 MMOL/L — SIGNIFICANT CHANGE UP (ref 22–31)
CREAT SERPL-MCNC: 0.94 MG/DL — SIGNIFICANT CHANGE UP (ref 0.5–1.3)
EGFR: 89 ML/MIN/1.73M2 — SIGNIFICANT CHANGE UP
GLUCOSE SERPL-MCNC: 74 MG/DL — SIGNIFICANT CHANGE UP (ref 70–99)
HCT VFR BLD CALC: 25.2 % — LOW (ref 39–50)
HGB BLD-MCNC: 8.2 G/DL — LOW (ref 13–17)
MCHC RBC-ENTMCNC: 29.9 PG — SIGNIFICANT CHANGE UP (ref 27–34)
MCHC RBC-ENTMCNC: 32.5 G/DL — SIGNIFICANT CHANGE UP (ref 32–36)
MCV RBC AUTO: 92 FL — SIGNIFICANT CHANGE UP (ref 80–100)
NRBC # BLD: 0 /100 WBCS — SIGNIFICANT CHANGE UP (ref 0–0)
PLATELET # BLD AUTO: 319 K/UL — SIGNIFICANT CHANGE UP (ref 150–400)
POTASSIUM SERPL-MCNC: 4.2 MMOL/L — SIGNIFICANT CHANGE UP (ref 3.5–5.3)
POTASSIUM SERPL-SCNC: 4.2 MMOL/L — SIGNIFICANT CHANGE UP (ref 3.5–5.3)
RBC # BLD: 2.74 M/UL — LOW (ref 4.2–5.8)
RBC # FLD: 18.6 % — HIGH (ref 10.3–14.5)
SODIUM SERPL-SCNC: 136 MMOL/L — SIGNIFICANT CHANGE UP (ref 135–145)
WBC # BLD: 5 K/UL — SIGNIFICANT CHANGE UP (ref 3.8–10.5)
WBC # FLD AUTO: 5 K/UL — SIGNIFICANT CHANGE UP (ref 3.8–10.5)

## 2024-12-17 PROCEDURE — 99232 SBSQ HOSP IP/OBS MODERATE 35: CPT

## 2024-12-17 PROCEDURE — 99233 SBSQ HOSP IP/OBS HIGH 50: CPT

## 2024-12-17 RX ORDER — LISINOPRIL 20 MG/1
20 TABLET ORAL DAILY
Refills: 0 | Status: DISCONTINUED | OUTPATIENT
Start: 2024-12-17 | End: 2024-12-18

## 2024-12-17 RX ORDER — CARVEDILOL 25 MG/1
1 TABLET, FILM COATED ORAL
Qty: 0 | Refills: 0 | DISCHARGE
Start: 2024-12-17

## 2024-12-17 RX ORDER — PREDNISONE 20 MG/1
1 TABLET ORAL
Qty: 0 | Refills: 0 | DISCHARGE
Start: 2024-12-17

## 2024-12-17 RX ORDER — POLYETHYLENE GLYCOL 3350 17 G/17G
17 POWDER, FOR SOLUTION ORAL
Qty: 0 | Refills: 0 | DISCHARGE
Start: 2024-12-17

## 2024-12-17 RX ADMIN — Medication 7 UNIT(S): at 07:56

## 2024-12-17 RX ADMIN — Medication 6: at 16:51

## 2024-12-17 RX ADMIN — LISINOPRIL 20 MILLIGRAM(S): 20 TABLET ORAL at 05:50

## 2024-12-17 RX ADMIN — ROSUVASTATIN CALCIUM 10 MILLIGRAM(S): 5 TABLET, FILM COATED ORAL at 21:15

## 2024-12-17 RX ADMIN — TACROLIMUS 2 MILLIGRAM(S): 5 CAPSULE ORAL at 11:47

## 2024-12-17 RX ADMIN — Medication 88 MICROGRAM(S): at 06:08

## 2024-12-17 RX ADMIN — AZATHIOPRINE 50 MILLIGRAM(S): 100 TABLET ORAL at 17:35

## 2024-12-17 RX ADMIN — PANTOPRAZOLE SODIUM 40 MILLIGRAM(S): 40 TABLET, DELAYED RELEASE ORAL at 05:50

## 2024-12-17 RX ADMIN — Medication 500 MILLIGRAM(S): at 17:35

## 2024-12-17 RX ADMIN — ESCITALOPRAM OXALATE 10 MILLIGRAM(S): 10 TABLET, FILM COATED ORAL at 11:45

## 2024-12-17 RX ADMIN — CLONIDINE HYDROCHLORIDE 0.1 MILLIGRAM(S): 0.3 TABLET ORAL at 21:15

## 2024-12-17 RX ADMIN — Medication 325 MILLIGRAM(S): at 17:35

## 2024-12-17 RX ADMIN — PANTOPRAZOLE SODIUM 40 MILLIGRAM(S): 40 TABLET, DELAYED RELEASE ORAL at 17:35

## 2024-12-17 RX ADMIN — Medication 7 UNIT(S): at 16:52

## 2024-12-17 RX ADMIN — HYDRALAZINE HYDROCHLORIDE 100 MILLIGRAM(S): 10 TABLET ORAL at 14:06

## 2024-12-17 RX ADMIN — CARVEDILOL 12.5 MILLIGRAM(S): 25 TABLET, FILM COATED ORAL at 05:49

## 2024-12-17 RX ADMIN — INSULIN GLARGINE 30 UNIT(S): 100 INJECTION, SOLUTION SUBCUTANEOUS at 21:16

## 2024-12-17 RX ADMIN — CLONIDINE HYDROCHLORIDE 0.1 MILLIGRAM(S): 0.3 TABLET ORAL at 05:49

## 2024-12-17 RX ADMIN — AZATHIOPRINE 50 MILLIGRAM(S): 100 TABLET ORAL at 06:08

## 2024-12-17 RX ADMIN — POLYETHYLENE GLYCOL 3350 17 GRAM(S): 17 POWDER, FOR SOLUTION ORAL at 11:46

## 2024-12-17 RX ADMIN — Medication 500 MILLIGRAM(S): at 05:49

## 2024-12-17 RX ADMIN — PREDNISONE 20 MILLIGRAM(S): 20 TABLET ORAL at 05:49

## 2024-12-17 RX ADMIN — ESCITALOPRAM OXALATE 10 MILLIGRAM(S): 10 TABLET, FILM COATED ORAL at 06:08

## 2024-12-17 RX ADMIN — Medication 300 MILLIGRAM(S): at 11:47

## 2024-12-17 RX ADMIN — ESCITALOPRAM OXALATE 10 MILLIGRAM(S): 10 TABLET, FILM COATED ORAL at 17:52

## 2024-12-17 RX ADMIN — Medication 2 TABLET(S): at 21:15

## 2024-12-17 RX ADMIN — AMLODIPINE BESYLATE 10 MILLIGRAM(S): 10 TABLET ORAL at 05:50

## 2024-12-17 RX ADMIN — HYDRALAZINE HYDROCHLORIDE 100 MILLIGRAM(S): 10 TABLET ORAL at 21:15

## 2024-12-17 RX ADMIN — CLONIDINE HYDROCHLORIDE 0.1 MILLIGRAM(S): 0.3 TABLET ORAL at 14:07

## 2024-12-17 RX ADMIN — Medication 50 MILLIGRAM(S): at 12:18

## 2024-12-17 RX ADMIN — Medication 7 UNIT(S): at 11:46

## 2024-12-17 RX ADMIN — HYDRALAZINE HYDROCHLORIDE 100 MILLIGRAM(S): 10 TABLET ORAL at 05:50

## 2024-12-17 RX ADMIN — Medication 325 MILLIGRAM(S): at 05:49

## 2024-12-17 RX ADMIN — TACROLIMUS 1 MILLIGRAM(S): 5 CAPSULE ORAL at 21:15

## 2024-12-17 NOTE — PROGRESS NOTE ADULT - SUBJECTIVE AND OBJECTIVE BOX
[facility]    Remi Sampson NP    [address] [place] [address]  [phone]      Chief Complaint:  Patient is a 69y old  Male who presents with a chief complaint of AMS (17 Dec 2024 10:25)      HPI/ 24 hr events:   Patient seen and examined at bedside  Reports feeling well this morning   No acute GI complaints        REVIEW OF SYSTEMS:   General: Negative  HEENT: Negative  CV: Negative  Respiratory: Negative  GI: See HPI  : Negative  MSK: Negative  Hematologic: Negative  Skin: Negative    MEDICATIONS:   MEDICATIONS  (STANDING):  amLODIPine   Tablet 10 milliGRAM(s) Oral daily  ascorbic acid 500 milliGRAM(s) Oral two times a day  azaTHIOprine 50 milliGRAM(s) Oral two times a day  buPROPion XL (24-Hour) . 300 milliGRAM(s) Oral daily  carvedilol 12.5 milliGRAM(s) Oral every 12 hours  cloNIDine 0.1 milliGRAM(s) Oral three times a day  dextrose 5% + sodium chloride 0.9%. 1000 milliLiter(s) (50 mL/Hr) IV Continuous <Continuous>  dextrose 5%. 1000 milliLiter(s) (50 mL/Hr) IV Continuous <Continuous>  dextrose 5%. 1000 milliLiter(s) (100 mL/Hr) IV Continuous <Continuous>  dextrose 50% Injectable 25 Gram(s) IV Push once  dextrose 50% Injectable 12.5 Gram(s) IV Push once  dextrose 50% Injectable 25 Gram(s) IV Push once  escitalopram 10 milliGRAM(s) Oral <User Schedule>  ferrous    sulfate 325 milliGRAM(s) Oral two times a day  glucagon  Injectable 1 milliGRAM(s) IntraMuscular once  hydrALAZINE 100 milliGRAM(s) Oral three times a day  insulin glargine Injectable (LANTUS) 30 Unit(s) SubCutaneous at bedtime  insulin lispro (ADMELOG) corrective regimen sliding scale   SubCutaneous three times a day before meals  insulin lispro Injectable (ADMELOG) 7 Unit(s) SubCutaneous three times a day before meals  levothyroxine 88 MICROGram(s) Oral <User Schedule>  lisinopril 20 milliGRAM(s) Oral daily  mineral oil enema 133 milliLiter(s) Rectal once  pantoprazole    Tablet 40 milliGRAM(s) Oral two times a day  polyethylene glycol 3350 17 Gram(s) Oral daily  predniSONE   Tablet 20 milliGRAM(s) Oral daily  pyridoxine 50 milliGRAM(s) Oral daily  rosuvastatin 10 milliGRAM(s) Oral at bedtime  senna 2 Tablet(s) Oral at bedtime  tacrolimus 1 milliGRAM(s) Oral at bedtime  tacrolimus 2 milliGRAM(s) Oral daily    MEDICATIONS  (PRN):  aluminum hydroxide/magnesium hydroxide/simethicone Suspension 30 milliLiter(s) Oral every 4 hours PRN Dyspepsia  dextrose Oral Gel 15 Gram(s) Oral once PRN Blood Glucose LESS THAN 70 milliGRAM(s)/deciliter  hydrALAZINE Injectable 10 milliGRAM(s) IV Push every 8 hours PRN SBP >180 and HR 60-70bpm  melatonin 3 milliGRAM(s) Oral at bedtime PRN Insomnia  metoprolol tartrate Injectable 5 milliGRAM(s) IV Push every 6 hours PRN SBP >170  ondansetron Injectable 4 milliGRAM(s) IV Push every 8 hours PRN Nausea and/or Vomiting      ALLERGIES:   Allergies    No Known Allergies    Intolerances        VITAL SIGNS:   Vital Signs Last 24 Hrs  T(C): 36.9 (17 Dec 2024 05:10), Max: 36.9 (16 Dec 2024 20:49)  T(F): 98.5 (17 Dec 2024 05:10), Max: 98.5 (16 Dec 2024 20:49)  HR: 62 (17 Dec 2024 05:10) (57 - 62)  BP: 164/61 (17 Dec 2024 05:10) (94/52 - 164/61)  BP(mean): --  RR: 18 (17 Dec 2024 05:10) (12 - 20)  SpO2: 98% (17 Dec 2024 05:10) (96% - 100%)    Parameters below as of 17 Dec 2024 05:10  Patient On (Oxygen Delivery Method): room air      I&O's Summary      PHYSICAL EXAM:   GENERAL:  No acute distress  HEENT:  NC/AT  CHEST:  No increased effort  HEART:  Regular rate  ABDOMEN:  Soft, non-tender, non-distended, positive bowel sounds  EXTREMITIES: No cyanosis  SKIN:  Warm, dry  NEURO:  Calm, cooperative, confused    LABS:                        8.2    5.00  )-----------( 319      ( 17 Dec 2024 07:59 )             25.2     12-17    136  |  104  |  31[H]  ----------------------------<  74  4.2   |  28  |  0.94    Ca    11.4[H]      17 Dec 2024 07:59    TPro  6.9  /  Alb  2.1[L]  /  TBili  0.3  /  DBili  x   /  AST  30  /  ALT  30  /  AlkPhos  111  12-16    LIVER FUNCTIONS - ( 16 Dec 2024 09:15 )  Alb: 2.1 g/dL / Pro: 6.9 g/dL / ALK PHOS: 111 U/L / ALT: 30 U/L / AST: 30 U/L / GGT: x                                             RADIOLOGY & ADDITIONAL STUDIES:

## 2024-12-17 NOTE — PROGRESS NOTE ADULT - ASSESSMENT
CKD 3, h/o Kidney transplant ~2012, h/o Left Nephrectomy  Diabetes  Hypertension  Sepsis, UTI, Sacral osteomyelitis, Gram negative bacteremia  Hypokalemia  Hypercalcemia, Elevated 1,25 Vitamin D (ACEI level: WNL), R/o Lymphoma  Hypomagnesemia  Hypophosphatemia  Anemia  + Liver mass    12/16/24: Stable renal indices. To continue current immuno suppressants. Calcium levels remain elevated. Will redose aredia.   Monitor blood sugar levels. Insulin coverage as needed. Dietary restriction. Low BP. Lower lisinopril. Trend BP and titrate BP meds as needed.   Trend BP and titrate BP meds as needed. Hematology follow up. Will follow electrolytes and renal function trend. For liver biopsy.   12/17/24: s/p Liver biopsy. Pathology pending. Monitor calcium trend. Continue steroids for now. D/c planning.

## 2024-12-17 NOTE — PROGRESS NOTE ADULT - TIME BILLING
Note written by attending. Meds, labs, vitals, chart reviewed. Plan d/w wife, patient's son Jose J as well Note written by attending. Meds, labs, vitals, chart reviewed. Plan d/w wife, patient's son Jose J as well    Spoke to Wife today at bedside. Informed her that Oncology has cleared patient for discharge and that they will f/u the Pathology result when is available and them. Will also put in their contact information. She agreed with discharge planning for MILO in am. SW informed

## 2024-12-17 NOTE — PROGRESS NOTE ADULT - ASSESSMENT
Patient is a 68yo M, PMH DM, HTN, HLD, h/o kidney transplant, hypothyroidism, presents to ED from [facility] for AMS likely  acute  metabolic encephalopathy         Hepatic lesion :   - CT abd with 5.5cm lesion on R hepatic lobe also noticed  retroperitoneal  fibrosis  and spleen mass   - histopath not fully reported by  [facility] in sept /2024 paper work   - hem/onc  DR PARSON / and nephro dr vizcarra   recommended repeat biopsy   - MRI abdomen reviewed, noted for hepatic lesion.   - S/p  IR guided biopsy 12/16/2024.   -Oncology follow up.       AMS 2/2 Sepsis 2/2 multiple infections (UTI, sacral infection, possible osteomyelitis), E coli bacteremia   acute  metabolic encephalopathy : Completed course of Antibiotics, At baseline Mental Status   Sepsis 2/2 ESBL Ecoli UTI and bacteremia. sensitive to ertapenem.  -Completed course of  Meropenum until 12/10  -Tylenol PRN pain and fever- diet as tolerated   -aspiration and fall risk - Continue Senna, Miralax, PRN dulcolax suppositories   -MR pelvis/sacrum to eval for osteomyelitis-  Again seen are comminuted bilateral sacral lona fractures with marked osseous edema and marked loss of normal T1 fatty marrow. Osseous edema with loss of normal T1 fatty marrow at the caudal aspect of the left posterior iliac bone adjacent to the sacroiliac joint. Previously seen fracture component is better visualized on CT. These findings could be secondary to extensive fracture deformities in an osteopenic patient versus osteomyelitis if there was a history of a soft tissue ulcer extending to bone versus metastatic disease given the marked loss of T1 fatty marrow if there is a history of malignancy. Clinical correlation with patient history is advised.  - Per Ortho no surgical intervention for fracture of sacrum  / needs dolores     Acute on Chronic anemia  - Likely due to infection,  and CKD- No signs of acute  bleeding noted   - Iron22/ sat low    iron deficiency with chr anemia  +  - S/p  1unit of pRBC today,  given hx of renal transplant will use irradiated PRBC   - Hem Dr. Parson following     Diabetes Mellitus / hyperglycemia  - A1c 7.4  - Added  Pre meal Insulin, continue Lantus. Will adjust doses as needed   - ISS  - carb controlled diet    HTN  - Continue Lisinopril 40 mg  daily   - Continue Hydralazine 100mg TID with hold parameters  - Continue Coreg, switched from Metoprolol - 12.5 mg po bid  - Continue Amlodipine 10mg daily -  - added on clonidine 0.1  mg tid     HLD  Continue Crestor 10mg daily    s/p Kidney transplant/CKD 3  - Continue Tacrolimus 2mg daily  - Continue Tacrolimus 1mg QHS  - Continue Azathioprine 50mg BID  - Nephrology consulted dr vizcarra     Hyperkalemia: Antony PRN. Monitor K     Hypothyroidism  - Continue Levothyroxine 88mcg QAM    Hypercalcemia  - possible  sec to  underlying  lymphoproliferative disorder  - sec to vit D  s/p  calcitonin   s/p   Aredia   - On Prednisone   - Nephro following    Depression  Continue Escitalopram 10mg TID  Continue Bupropion 300mg daily        DVT ppx: Hold AC due to anemia and risk of bleeding         wife updated. Ludmila Azeem [phone]. She agreed with Liver biopsy on Monday   Dispo: JOCELYNN bernal

## 2024-12-17 NOTE — PROGRESS NOTE ADULT - SUBJECTIVE AND OBJECTIVE BOX
Patient is a 69y old  Male who presents with a chief complaint of AMS (16 Dec 2024 15:27)       INTERVAL HPI/OVERNIGHT EVENTS: Patient seen and examined at bedside. Denies any new symptoms, complaints. No abdominal pain, n/v/d. Wants to go home nor nursing home    MEDICATIONS  (STANDING):  amLODIPine   Tablet 10 milliGRAM(s) Oral daily  ascorbic acid 500 milliGRAM(s) Oral two times a day  azaTHIOprine 50 milliGRAM(s) Oral two times a day  buPROPion XL (24-Hour) . 300 milliGRAM(s) Oral daily  carvedilol 12.5 milliGRAM(s) Oral every 12 hours  cloNIDine 0.1 milliGRAM(s) Oral three times a day  dextrose 5% + sodium chloride 0.9%. 1000 milliLiter(s) (50 mL/Hr) IV Continuous <Continuous>  dextrose 5%. 1000 milliLiter(s) (100 mL/Hr) IV Continuous <Continuous>  dextrose 5%. 1000 milliLiter(s) (50 mL/Hr) IV Continuous <Continuous>  dextrose 50% Injectable 25 Gram(s) IV Push once  dextrose 50% Injectable 12.5 Gram(s) IV Push once  dextrose 50% Injectable 25 Gram(s) IV Push once  escitalopram 10 milliGRAM(s) Oral <User Schedule>  ferrous    sulfate 325 milliGRAM(s) Oral two times a day  glucagon  Injectable 1 milliGRAM(s) IntraMuscular once  hydrALAZINE 100 milliGRAM(s) Oral three times a day  insulin glargine Injectable (LANTUS) 30 Unit(s) SubCutaneous at bedtime  insulin lispro (ADMELOG) corrective regimen sliding scale   SubCutaneous three times a day before meals  insulin lispro Injectable (ADMELOG) 7 Unit(s) SubCutaneous three times a day before meals  levothyroxine 88 MICROGram(s) Oral <User Schedule>  lisinopril 20 milliGRAM(s) Oral daily  mineral oil enema 133 milliLiter(s) Rectal once  pantoprazole    Tablet 40 milliGRAM(s) Oral two times a day  polyethylene glycol 3350 17 Gram(s) Oral daily  predniSONE   Tablet 20 milliGRAM(s) Oral daily  pyridoxine 50 milliGRAM(s) Oral daily  rosuvastatin 10 milliGRAM(s) Oral at bedtime  senna 2 Tablet(s) Oral at bedtime  tacrolimus 2 milliGRAM(s) Oral daily  tacrolimus 1 milliGRAM(s) Oral at bedtime    MEDICATIONS  (PRN):  aluminum hydroxide/magnesium hydroxide/simethicone Suspension 30 milliLiter(s) Oral every 4 hours PRN Dyspepsia  dextrose Oral Gel 15 Gram(s) Oral once PRN Blood Glucose LESS THAN 70 milliGRAM(s)/deciliter  hydrALAZINE Injectable 10 milliGRAM(s) IV Push every 8 hours PRN SBP >180 and HR 60-70bpm  melatonin 3 milliGRAM(s) Oral at bedtime PRN Insomnia  metoprolol tartrate Injectable 5 milliGRAM(s) IV Push every 6 hours PRN SBP >170  ondansetron Injectable 4 milliGRAM(s) IV Push every 8 hours PRN Nausea and/or Vomiting      Allergies    No Known Allergies    Intolerances        REVIEW OF SYSTEMS:  CONSTITUTIONAL: No fever, + Gen weakness   EYES: No eye pain, visual disturbances, or discharge  ENMT:  No difficulty hearing, tinnitus, vertigo; No sinus or throat pain  NECK: No pain or stiffness  RESPIRATORY: No cough, wheezing, chills or hemoptysis; No shortness of breath  CARDIOVASCULAR: No chest pain, palpitations, dizziness, or leg swelling  GASTROINTESTINAL: No abdominal or epigastric pain. No nausea, vomiting, or hematemesis; No diarrhea or constipation.   GENITOURINARY: No dysuria, frequency, hematuria, or incontinence  SKIN: No itching, burning, rashes, or lesions   LYMPH NODES: No enlarged glands  ALLERGY AND IMMUNOLOGIC: No hives or eczema    Vital Signs Last 24 Hrs  T(C): 36.9 (17 Dec 2024 05:10), Max: 36.9 (16 Dec 2024 20:49)  T(F): 98.5 (17 Dec 2024 05:10), Max: 98.5 (16 Dec 2024 20:49)  HR: 62 (17 Dec 2024 05:10) (57 - 62)  BP: 164/61 (17 Dec 2024 05:10) (94/52 - 164/61)  BP(mean): --  RR: 18 (17 Dec 2024 05:10) (12 - 20)  SpO2: 98% (17 Dec 2024 05:10) (96% - 100%)    Parameters below as of 17 Dec 2024 05:10  Patient On (Oxygen Delivery Method): room air        PHYSICAL EXAM:  GEN: NAD, AAOx3 today   HEENT: normocephalic and atraumatic. EOMI. .    NECK: Supple.  No lymphadenopathy   LUNGS: Clear to auscultation. No wheezing   HEART: Regular rate and rhythm,  no MRG  ABDOMEN: Soft, nontender, and nondistended.  Positive bowel sounds.    : No CVA tenderness  EXTREMITIES: Without edema.  NEUROLOGIC: grossly intact. no focal deficits   PSYCHIATRIC: Appropriate affect .  SKIN: No rash     LABS:                        8.5    4.52  )-----------( 336      ( 16 Dec 2024 09:15 )             26.3     16 Dec 2024 09:15    135    |  103    |  34     ----------------------------<  117    4.7     |  25     |  0.87     Ca    11.3       16 Dec 2024 09:15    TPro  6.9    /  Alb  2.1    /  TBili  0.3    /  DBili  x      /  AST  30     /  ALT  30     /  AlkPhos  111    16 Dec 2024 09:15      CAPILLARY BLOOD GLUCOSE      POCT Blood Glucose.: 87 mg/dL (17 Dec 2024 07:40)  POCT Blood Glucose.: 223 mg/dL (16 Dec 2024 21:38)  POCT Blood Glucose.: 122 mg/dL (16 Dec 2024 16:46)  POCT Blood Glucose.: 145 mg/dL (16 Dec 2024 11:21)    BLOOD CULTURE    RADIOLOGY & ADDITIONAL TESTS:    Imaging Personally Reviewed:  [ ] YES     Consultant(s) Notes Reviewed:      Care Discussed with Consultants/Other Providers: Patient is a 69y old  Male who presents with a chief complaint of AMS (16 Dec 2024 15:27)       INTERVAL HPI/OVERNIGHT EVENTS: Patient seen and examined at bedside. Denies any new symptoms, complaints. No abdominal pain, n/v/d. Wants to be discharged     MEDICATIONS  (STANDING):  amLODIPine   Tablet 10 milliGRAM(s) Oral daily  ascorbic acid 500 milliGRAM(s) Oral two times a day  azaTHIOprine 50 milliGRAM(s) Oral two times a day  buPROPion XL (24-Hour) . 300 milliGRAM(s) Oral daily  carvedilol 12.5 milliGRAM(s) Oral every 12 hours  cloNIDine 0.1 milliGRAM(s) Oral three times a day  dextrose 5% + sodium chloride 0.9%. 1000 milliLiter(s) (50 mL/Hr) IV Continuous <Continuous>  dextrose 5%. 1000 milliLiter(s) (100 mL/Hr) IV Continuous <Continuous>  dextrose 5%. 1000 milliLiter(s) (50 mL/Hr) IV Continuous <Continuous>  dextrose 50% Injectable 25 Gram(s) IV Push once  dextrose 50% Injectable 12.5 Gram(s) IV Push once  dextrose 50% Injectable 25 Gram(s) IV Push once  escitalopram 10 milliGRAM(s) Oral <User Schedule>  ferrous    sulfate 325 milliGRAM(s) Oral two times a day  glucagon  Injectable 1 milliGRAM(s) IntraMuscular once  hydrALAZINE 100 milliGRAM(s) Oral three times a day  insulin glargine Injectable (LANTUS) 30 Unit(s) SubCutaneous at bedtime  insulin lispro (ADMELOG) corrective regimen sliding scale   SubCutaneous three times a day before meals  insulin lispro Injectable (ADMELOG) 7 Unit(s) SubCutaneous three times a day before meals  levothyroxine 88 MICROGram(s) Oral <User Schedule>  lisinopril 20 milliGRAM(s) Oral daily  mineral oil enema 133 milliLiter(s) Rectal once  pantoprazole    Tablet 40 milliGRAM(s) Oral two times a day  polyethylene glycol 3350 17 Gram(s) Oral daily  predniSONE   Tablet 20 milliGRAM(s) Oral daily  pyridoxine 50 milliGRAM(s) Oral daily  rosuvastatin 10 milliGRAM(s) Oral at bedtime  senna 2 Tablet(s) Oral at bedtime  tacrolimus 2 milliGRAM(s) Oral daily  tacrolimus 1 milliGRAM(s) Oral at bedtime    MEDICATIONS  (PRN):  aluminum hydroxide/magnesium hydroxide/simethicone Suspension 30 milliLiter(s) Oral every 4 hours PRN Dyspepsia  dextrose Oral Gel 15 Gram(s) Oral once PRN Blood Glucose LESS THAN 70 milliGRAM(s)/deciliter  hydrALAZINE Injectable 10 milliGRAM(s) IV Push every 8 hours PRN SBP >180 and HR 60-70bpm  melatonin 3 milliGRAM(s) Oral at bedtime PRN Insomnia  metoprolol tartrate Injectable 5 milliGRAM(s) IV Push every 6 hours PRN SBP >170  ondansetron Injectable 4 milliGRAM(s) IV Push every 8 hours PRN Nausea and/or Vomiting      Allergies    No Known Allergies    Intolerances        REVIEW OF SYSTEMS:  CONSTITUTIONAL: No fever, + Gen weakness   EYES: No eye pain, visual disturbances, or discharge  ENMT:  No difficulty hearing, tinnitus, vertigo; No sinus or throat pain  NECK: No pain or stiffness  RESPIRATORY: No cough, wheezing, chills or hemoptysis; No shortness of breath  CARDIOVASCULAR: No chest pain, palpitations, dizziness, or leg swelling  GASTROINTESTINAL: No abdominal or epigastric pain. No nausea, vomiting, or hematemesis; No diarrhea or constipation.   GENITOURINARY: No dysuria, frequency, hematuria, or incontinence  SKIN: No itching, burning, rashes, or lesions   LYMPH NODES: No enlarged glands  ALLERGY AND IMMUNOLOGIC: No hives or eczema    Vital Signs Last 24 Hrs  T(C): 36.9 (17 Dec 2024 05:10), Max: 36.9 (16 Dec 2024 20:49)  T(F): 98.5 (17 Dec 2024 05:10), Max: 98.5 (16 Dec 2024 20:49)  HR: 62 (17 Dec 2024 05:10) (57 - 62)  BP: 164/61 (17 Dec 2024 05:10) (94/52 - 164/61)  BP(mean): --  RR: 18 (17 Dec 2024 05:10) (12 - 20)  SpO2: 98% (17 Dec 2024 05:10) (96% - 100%)    Parameters below as of 17 Dec 2024 05:10  Patient On (Oxygen Delivery Method): room air        PHYSICAL EXAM:  GEN: NAD, AAOx3 today   HEENT: normocephalic and atraumatic. EOMI. .    NECK: Supple.  No lymphadenopathy   LUNGS: Clear to auscultation. No wheezing   HEART: Regular rate and rhythm,  no MRG  ABDOMEN: Soft, nontender, and nondistended.  Positive bowel sounds.    : No CVA tenderness  EXTREMITIES: Without edema.  NEUROLOGIC: grossly intact. no focal deficits   PSYCHIATRIC: Appropriate affect .  SKIN: No rash     LABS:                        8.5    4.52  )-----------( 336      ( 16 Dec 2024 09:15 )             26.3     16 Dec 2024 09:15    135    |  103    |  34     ----------------------------<  117    4.7     |  25     |  0.87     Ca    11.3       16 Dec 2024 09:15    TPro  6.9    /  Alb  2.1    /  TBili  0.3    /  DBili  x      /  AST  30     /  ALT  30     /  AlkPhos  111    16 Dec 2024 09:15      CAPILLARY BLOOD GLUCOSE      POCT Blood Glucose.: 87 mg/dL (17 Dec 2024 07:40)  POCT Blood Glucose.: 223 mg/dL (16 Dec 2024 21:38)  POCT Blood Glucose.: 122 mg/dL (16 Dec 2024 16:46)  POCT Blood Glucose.: 145 mg/dL (16 Dec 2024 11:21)    BLOOD CULTURE    RADIOLOGY & ADDITIONAL TESTS:    Imaging Personally Reviewed:  [ ] YES     Consultant(s) Notes Reviewed:      Care Discussed with Consultants/Other Providers:

## 2024-12-17 NOTE — PROGRESS NOTE ADULT - ASSESSMENT
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  - CBC noted, transfuse PRN   - PPI for ppx  - Monitor for any overt GI bleeding  - Outside path noted  - ?component of AIH      I reviewed the overnight course of events on the unit, re-confirming the patient history. I discussed the care with the patient.  Differential diagnosis and plan of care discussed with patient after the evaluation.  35 minutes spent on total encounter of which more than fifty percent of the encounter was spent counseling and/or coordinating care by the attending physician.

## 2024-12-17 NOTE — PROGRESS NOTE ADULT - SUBJECTIVE AND OBJECTIVE BOX
[facility], Division of Infectious Diseases  WANG Mccoy Y. Patel, S. Shah, G. [facility]  [phone]    Name: JAN CALVIN  Age: 69y  Gender: Male  MRN: [id number]    Interval History:  No acute overnight events.   Notes reviewed    Antibiotics:      Medications:  aluminum hydroxide/magnesium hydroxide/simethicone Suspension 30 milliLiter(s) Oral every 4 hours PRN  amLODIPine   Tablet 10 milliGRAM(s) Oral daily  ascorbic acid 500 milliGRAM(s) Oral two times a day  azaTHIOprine 50 milliGRAM(s) Oral two times a day  buPROPion XL (24-Hour) . 300 milliGRAM(s) Oral daily  carvedilol 12.5 milliGRAM(s) Oral every 12 hours  cloNIDine 0.1 milliGRAM(s) Oral three times a day  dextrose 5% + sodium chloride 0.9%. 1000 milliLiter(s) IV Continuous <Continuous>  dextrose 5%. 1000 milliLiter(s) IV Continuous <Continuous>  dextrose 5%. 1000 milliLiter(s) IV Continuous <Continuous>  dextrose 50% Injectable 25 Gram(s) IV Push once  dextrose 50% Injectable 12.5 Gram(s) IV Push once  dextrose 50% Injectable 25 Gram(s) IV Push once  dextrose Oral Gel 15 Gram(s) Oral once PRN  escitalopram 10 milliGRAM(s) Oral <User Schedule>  ferrous    sulfate 325 milliGRAM(s) Oral two times a day  glucagon  Injectable 1 milliGRAM(s) IntraMuscular once  hydrALAZINE 100 milliGRAM(s) Oral three times a day  hydrALAZINE Injectable 10 milliGRAM(s) IV Push every 8 hours PRN  insulin glargine Injectable (LANTUS) 30 Unit(s) SubCutaneous at bedtime  insulin lispro (ADMELOG) corrective regimen sliding scale   SubCutaneous three times a day before meals  insulin lispro Injectable (ADMELOG) 7 Unit(s) SubCutaneous three times a day before meals  levothyroxine 88 MICROGram(s) Oral <User Schedule>  lisinopril 20 milliGRAM(s) Oral daily  melatonin 3 milliGRAM(s) Oral at bedtime PRN  metoprolol tartrate Injectable 5 milliGRAM(s) IV Push every 6 hours PRN  mineral oil enema 133 milliLiter(s) Rectal once  ondansetron Injectable 4 milliGRAM(s) IV Push every 8 hours PRN  pantoprazole    Tablet 40 milliGRAM(s) Oral two times a day  polyethylene glycol 3350 17 Gram(s) Oral daily  predniSONE   Tablet 20 milliGRAM(s) Oral daily  pyridoxine 50 milliGRAM(s) Oral daily  rosuvastatin 10 milliGRAM(s) Oral at bedtime  senna 2 Tablet(s) Oral at bedtime  tacrolimus 2 milliGRAM(s) Oral daily  tacrolimus 1 milliGRAM(s) Oral at bedtime      Review of Systems:  unable to obtain  Allergies: No Known Allergies    For details regarding the patient's past medical history, social history, family history, and other miscellaneous elements, please refer the initial infectious diseases consultation and/or the admitting history and physical examination for this admission.    Objective:  Vitals:   T(C): 36.4 (12-17-24 @ 11:44), Max: 36.9 (12-16-24 @ 20:49)  HR: 62 (12-17-24 @ 11:44) (57 - 62)  BP: 119/56 (12-17-24 @ 11:44) (95/53 - 164/61)  RR: 18 (12-17-24 @ 11:44) (12 - 18)  SpO2: 96% (12-17-24 @ 11:44) (96% - 100%)    Physical Examination:  General: no acute distress  HEENT: NC/AT, EOMI,   Cardio: RRR  Resp: breath sounds heard bilaterally, no rales, wheezes or rhonchi  Abd: soft, NT, ND  Ext: no edema or cyanosis  Skin: warm, dry, no visible rash      Laboratory Studies:  CBC:                       8.2    5.00  )-----------( 319      ( 17 Dec 2024 07:59 )             25.2     CMP: 12-17    136  |  104  |  31[H]  ----------------------------<  74  4.2   |  28  |  0.94    Ca    11.4[H]      17 Dec 2024 07:59    TPro  6.9  /  Alb  2.1[L]  /  TBili  0.3  /  DBili  x   /  AST  30  /  ALT  30  /  AlkPhos  111  12-16    LIVER FUNCTIONS - ( 16 Dec 2024 09:15 )  Alb: 2.1 g/dL / Pro: 6.9 g/dL / ALK PHOS: 111 U/L / ALT: 30 U/L / AST: 30 U/L / GGT: x           Urinalysis Basic - ( 17 Dec 2024 07:59 )    Color: x / Appearance: x / SG: x / pH: x  Gluc: 74 mg/dL / Ketone: x  / Bili: x / Urobili: x   Blood: x / Protein: x / Nitrite: x   Leuk Esterase: x / RBC: x / WBC x   Sq Epi: x / Non Sq Epi: x / Bacteria: x        Microbiology: reviewed        Radiology: reviewed

## 2024-12-17 NOTE — PROGRESS NOTE ADULT - SUBJECTIVE AND OBJECTIVE BOX
[facility] -- Tom Rahman Pannella, Patel, Savella, Goodger, Cohen  Office # [phone]    Follow Up:  Uncontrolled HTN, Pericardial Effusion     Subjective/Observations: seen and examined, awake, alert, resting comfortably in bed, denies chest pain, dyspnea, palpitations or dizziness, orthopnea and PND. Tolerating room air.    REVIEW OF SYSTEMS: All other review of systems is negative unless indicated above  PAST MEDICAL & SURGICAL HISTORY:  Diabetes Mellitus Type II  Insulin pump (2010)      GERD (gastroesophageal reflux disease)      Depression      Hypothyroidism      Hyperlipidemia      Kidney transplanted  2012      Hypertension      ANGELIKA (obstructive sleep apnea)      Peripheral neuropathy      Shoulder fracture  Left.      History of colonoscopy      S/P kidney transplant  2012      S/P cholecystectomy      Retroperitoneal fibrosis  s/p surgery      AV fistula  Right arm      S/P right cataract extraction      S/P left cataract extraction      H/O unilateral nephrectomy  Left        MEDICATIONS  (STANDING):  amLODIPine   Tablet 10 milliGRAM(s) Oral daily  ascorbic acid 500 milliGRAM(s) Oral two times a day  azaTHIOprine 50 milliGRAM(s) Oral two times a day  buPROPion XL (24-Hour) . 300 milliGRAM(s) Oral daily  carvedilol 12.5 milliGRAM(s) Oral every 12 hours  cloNIDine 0.1 milliGRAM(s) Oral three times a day  dextrose 5% + sodium chloride 0.9%. 1000 milliLiter(s) (50 mL/Hr) IV Continuous <Continuous>  dextrose 5%. 1000 milliLiter(s) (50 mL/Hr) IV Continuous <Continuous>  dextrose 5%. 1000 milliLiter(s) (100 mL/Hr) IV Continuous <Continuous>  dextrose 50% Injectable 25 Gram(s) IV Push once  dextrose 50% Injectable 12.5 Gram(s) IV Push once  dextrose 50% Injectable 25 Gram(s) IV Push once  escitalopram 10 milliGRAM(s) Oral <User Schedule>  ferrous    sulfate 325 milliGRAM(s) Oral two times a day  glucagon  Injectable 1 milliGRAM(s) IntraMuscular once  hydrALAZINE 100 milliGRAM(s) Oral three times a day  insulin glargine Injectable (LANTUS) 30 Unit(s) SubCutaneous at bedtime  insulin lispro (ADMELOG) corrective regimen sliding scale   SubCutaneous three times a day before meals  insulin lispro Injectable (ADMELOG) 7 Unit(s) SubCutaneous three times a day before meals  levothyroxine 88 MICROGram(s) Oral <User Schedule>  lisinopril 20 milliGRAM(s) Oral daily  mineral oil enema 133 milliLiter(s) Rectal once  pantoprazole    Tablet 40 milliGRAM(s) Oral two times a day  polyethylene glycol 3350 17 Gram(s) Oral daily  predniSONE   Tablet 20 milliGRAM(s) Oral daily  pyridoxine 50 milliGRAM(s) Oral daily      senna 2 Tablet(s) Oral at bedtime  tacrolimus 1 milliGRAM(s) Oral at bedtime  tacrolimus 2 milliGRAM(s) Oral daily    MEDICATIONS  (PRN):  aluminum hydroxide/magnesium hydroxide/simethicone Suspension 30 milliLiter(s) Oral every 4 hours PRN Dyspepsia  dextrose Oral Gel 15 Gram(s) Oral once PRN Blood Glucose LESS THAN 70 milliGRAM(s)/deciliter  hydrALAZINE Injectable 10 milliGRAM(s) IV Push every 8 hours PRN SBP >180 and HR 60-70bpm  melatonin 3 milliGRAM(s) Oral at bedtime PRN Insomnia  metoprolol tartrate Injectable 5 milliGRAM(s) IV Push every 6 hours PRN SBP >170  ondansetron Injectable 4 milliGRAM(s) IV Push every 8 hours PRN Nausea and/or Vomiting    Allergies    No Known Allergies    Intolerances      Vital Signs Last 24 Hrs  T(C): 36.9 (17 Dec 2024 05:10), Max: 36.9 (16 Dec 2024 20:49)  T(F): 98.5 (17 Dec 2024 05:10), Max: 98.5 (16 Dec 2024 20:49)  HR: 62 (17 Dec 2024 05:10) (57 - 62)  BP: 164/61 (17 Dec 2024 05:10) (94/52 - 164/61)  BP(mean): --  RR: 18 (17 Dec 2024 05:10) (12 - 20)  SpO2: 98% (17 Dec 2024 05:10) (96% - 100%)    Parameters below as of 17 Dec 2024 05:10  Patient On (Oxygen Delivery Method): room air      I&O's Summary    Weight (kg): 92 (12-16 @ 11:48)    TELE: Not on telemetry   PHYSICAL EXAM:  Constitutional: NAD, awake and alert  HEENT: Moist Mucous Membranes, Anicteric  Pulmonary: Non-labored, breath sounds are clear bilaterally, No wheezing, rales or rhonchi  Cardiovascular: Regular, S1 and S2, No murmurs, rubs, gallops or clicks  Gastrointestinal: Bowel Sounds present, soft, nontender.   Lymph: No peripheral edema. No lymphadenopathy.  Skin: No visible rashes or ulcers.  Psych:  Mood & affect appropriate  LABS: All Labs Reviewed:                        8.2    5.00  )-----------( 319      ( 17 Dec 2024 07:59 )             25.2                         8.5    4.52  )-----------( 336      ( 16 Dec 2024 09:15 )             26.3                         7.8    3.94  )-----------( 368      ( 15 Dec 2024 05:46 )             23.4     17 Dec 2024 07:59    136    |  104    |  31     ----------------------------<  74     4.2     |  28     |  0.94   16 Dec 2024 09:15    135    |  103    |  34     ----------------------------<  117    4.7     |  25     |  0.87   15 Dec 2024 05:46    133    |  101    |  37     ----------------------------<  227    4.5     |  29     |  0.92     Ca    11.4       17 Dec 2024 07:59  Ca    11.3       16 Dec 2024 09:15  Ca    11.7       15 Dec 2024 05:46    TPro  6.9    /  Alb  2.1    /  TBili  0.3    /  DBili  x      /  AST  30     /  ALT  30     /  AlkPhos  111    16 Dec 2024 09:15          12 Lead ECG:   Ventricular Rate 81 BPM    Atrial Rate 81 BPM    P-R Interval 148 ms    QRS Duration 104 ms    Q-T Interval 406 ms    QTC Calculation(Bazett) 471 ms    P Axis 44 degrees    R Axis 12 degrees    T Axis 53 degrees    Diagnosis Line Normal sinus rhythm  Normal ECG  When compared with ECG of 29-NOV-2024 07:11,  Nonspecific T wave abnormality, improved in Lateral leads  Confirmed by Kya Gonzalez ([id number]) on 12/1/2024 10:43:18 AM (12-01-24 @ 09:28)      TRANSTHORACIC ECHOCARDIOGRAM REPORT  ________________________________________________________________________________                                      _______       Pt. Name:       JAN CALVIN Study Date:    11/29/2024  MRN:            [id number]          YOB: 1955  Accession #:    [id number]         Age:           69 years  Account#:       [id number]        Gender:        M  Heart Rate:                       Height:        64.17 in (163.00 cm)  Rhythm:       Weight:        169.75 lb (77.00 kg)  Blood Pressure: 196/81 mmHg       BSA/BMI:       1.83 m² / 28.98 kg/m²  ________________________________________________________________________________________  Referring Physician:    [id number] Ale Ibrahim  Interpreting Physician: Kya Gonzalez MD  Primary Sonographer:    Butch Salzaar    CPT:               ECHO TTE WO CON COMP W DOPP - 99826.m  Indication(s):     Cardiac murmur, unspecified - R01.1  Procedure:         Transthoracic echocardiogram with 2-D, M-mode and complete                     spectral and color flow Doppler.  Ordering Location: [facility]  Admission Status:  ED    _______________________________________________________________________________________     CONCLUSIONS:      1. Left ventricular cavity is normal in size. Left ventricular systolic function is normal with an ejection fraction of 60 % by Moreno's method of disks.   2. Trace pericardial effusion noted adjacent to the posterior left ventricle.   3. Normal right ventricular cavity size and normal right ventricular systolic function.   4. Aortic valve anatomy cannot be determined with normal systolic excursion. There is calcification of the aortic valve leaflets.   5. Structurally normal mitral valve with normalleaflet excursion.   6. Structurally normal tricuspid valve with normal leaflet excursion. Trace tricuspid regurgitation.   7. The inferior vena cava is normal in size measuring 1.36 cm in diameter, (normal <2.1cm) with normal inspiratory collapse (normal >50%) consistent with normal right atrial pressure (~3, range 0-5mmHg).    ________________________________________________________________________________________  FINDINGS:     Left Ventricle:  The left ventricular cavity is normal in size. Left ventricular systolic function is normal with a calculated ejection fraction of 60 % by the Moreno's biplane method of disks.     Right Ventricle:  The right ventricular cavity is normal in size and right ventricular systolic function is normal. Tricuspid annular plane systolic excursion (TAPSE) is 2.9 cm (normal >=1.7 cm).     Left Atrium:  The left atrium is normal in size with an indexed volume of 33.15 ml/m².     Right Atrium:  The right atrium is normal in size with an indexed volume of 21.17 ml/m².     Interatrial Septum:  The interatrial septum appears intact.     Aortic Valve:  The aortic valve anatomy cannot be determined with normal systolic excursion. There is calcification of the aortic valve leaflets. The peak transaortic velocity is 2.15 m/s, peak transaortic gradient is 18.5 mmHg and mean transaortic gradient is 11.0 mmHg with an LVOT/aortic valve VTI ratio of 0.64. The aortic valve area is estimated at 2.67 cm² by the continuity equation. There is no evidence of aortic regurgitation.     Mitral Valve:  Structurally normal mitral valve with normal leaflet excursion. There is calcification of the mitral valve annulus.     Tricuspid Valve:  Structurally normal tricuspid valve with normal leaflet excursion. There is trace tricuspid regurgitation. Estimated pulmonary artery systolic pressure is 8 mmHg.     Aorta:  The aortic root at the sinuses of Valsalva is normal in size, measuring 3.50 cm (indexed 1.91 cm/m²). The ascending aorta is dilated, measuring 4.10 cm (indexed 2.24 cm/m²).     Pericardium:  There is a trace pericardial effusion noted adjacent to the posterior left ventricle.     Systemic Veins:  The inferior vena cava is normal in size measuring 1.36 cm in diameter, (normal <2.1cm) with normal inspiratory collapse (normal >50%) consistent with normal right atrial pressure (~3, range 0-5mmHg).  ____________________________________________________________________  QUANTITATIVE DATA:  Left Ventricle Measurements: (Indexed to BSA)     IVSd (2D):   1.0 cm  LVPWd (2D):  1.0 cm  LVIDd (2D):  5.3 cm  LVIDs (2D):  3.6 cm  LV Mass:     203 g  111.2 g/m²  LV Vol d, MOD A2C: 97.8 ml  53.50 ml/m²  LV Vol d, MOD A4C: 121.0 ml 66.19 ml/m²  LV Vol d, MOD BP:  109.5 ml 59.90 ml/m²  LV Vol s, MOD A2C: 36.4 ml  19.91 ml/m²  LV Vol s, MOD A4C: 49.5 ml  27.08 ml/m²  LV Vol s, MOD BP:  44.0 ml  24.04 ml/m²  LVOT SV MOD BP:    65.5 ml  LV EF% MOD BP:     60 %     MV E Vmax:    1.02 m/s  MV A Vmax:    0.93 m/s  MV E/A:       1.10  e' lateral:   13.10 cm/s  e' medial:    9.25 cm/s  E/e' lateral: 7.79  E/e' medial:  11.03  E/e' Average: 9.13  MV DT:        151 msec    Aorta Measurements: (Normal range) (Indexed to BSA)     Ao Root d     3.50 cm (3.1 - 3.7 cm) 1.91 cm/m²  Ao Asc d, 2D: 4.10  Ao Asc prox:  4.10 cm               2.24 cm/m²            Left Atrium Measurements: (Indexed to BSA)  LA Diam 2D: 4.40 cm         Right Ventricle Measurements: Right Atrial Measurements:     TAPSE:            2.9 cm      RA Vol:            38.70 ml  RV Base (RVID1):  3.7cm      RA Vol s, MOD A4C  38.7 ml  RV Mid (RVID2):   3.1 cm      RA Vol Index:      21.17 ml/m²  RV Major (RVID3): 8.0 cm      RA Area s, MOD A4C 14.7 cm²       LVOT / RVOT/ Qp/Qs Data: (Indexed to BSA)  LVOT Diameter:  2.30 cm  LVOT Area:      4.15cm²  LVOT Vmax:      1.34 m/s  LVOT Vmn:       0.949 m/s  LVOT VTI:       26.30 cm  LVOT peak grad: 7 mmHg  LVOT mean grad: 4.0 mmHg  LVOT SV:        109.3 ml  59.78 ml/m²    Aortic Valve Measurements:  AV Vmax:                2.2 m/s  AV Peak Gradient:       18.5 mmHg  AV Mean Gradient:       11.0 mmHg  AV VTI:                 41.0 cm  AV VTI Ratio:           0.64  AoV EOA, Contin:        2.67 cm²  AoV EOA, Contin i:      1.46 cm²/m²  AoV Dimensionless Index 0.64    Mitral Valve Measurements:     MV E Vmax: 1.0 m/s  MV A Vmax: 0.9 m/s  MV E/A:    1.1       Tricuspid Valve Measurements:     TR Vmax:          1.2 m/s  TR Peak Gradient: 5.3 mmHg  RA Pressure:      3 mmHg  PASP:             8 mmHg    ________________________________________________________________________________________  Electronically signed on 11/30/2024 at 1:57:15 PM by Kya Gonzalez MD         *** Final ***

## 2024-12-17 NOTE — PROGRESS NOTE ADULT - ASSESSMENT
68yo M, PMH DM, HTN, HLD, h/o kidney transplant, hypothyroidism, presents to ED from [facility] for AMS, found to be febrile with positive UA. 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 Cardiology called for pericardial effusion.     Uncontrolled HTN/Pericardial Effusion  - CT chest: Small left pleural effusion with small loculated components and small to moderate pericardial effusion  - TTE: EF 60%, trace pericardial effusion adjacent to the posterior LV.  No tamponade physiology.  No significant valvular disease  - No evidence of any meaningful volume overload.  Non-orthopneic on RA    - EKG with nonspecific TWI anteriorly, repeat unchanged  - No evidence of any active ischemia   - Continue statin   - Continue to hold ASA in setting of persistent anemia, though, neg FOBT.  s/p PRBC's for Hgb of 6.5 (12/10).  There appears to be no clear indication for it  - Continue to trend and transfuse per primary   - Heme and GI following for anemia.    - with liver mass on MRI, s/p liver Bx via IR (12/16) . Tolerated well from CV POV     - BP labile 100- 160's systolics   - Continue Norvasc 10mg and Lisinopril 40 mg daily  - Continue Clonidine 0.1 mg PO TID   - Continue Coreg 12.5mg BID   - Continue Hydralazine 100 mg q8H   - Monitor and replete Lytes. Keep K > 4 and Mg > 2    - Ortho following for pathological sacral Fx with possible OM.   - No acute orthopedic surgical intervention at this time.    - Will continue to follow.    RUSSELL Farias  Nurse Practitioner - Cardiology   call TEAMS

## 2024-12-17 NOTE — PROGRESS NOTE ADULT - NUTRITIONAL ASSESSMENT
Additional Information: liver biopsy  liver mass  NUMAC 10/31/24- lymphocytic infiltrate with lobular necroinflammatory process composed of lymphocyte plasma cell and occasional neutrophil and  eosinophil  bile duct  bile duct inflammation with patchy area of proliferation   .

## 2024-12-17 NOTE — PROGRESS NOTE ADULT - ASSESSMENT
Patient is a 68yo M, PMH DM, HTN, HLD, h/o kidney transplant, hypothyroidism, presents to ED from [facility] for AMS. Patient is lethargic and unable to provide history at this time.    ESBL Bacteremia 2/2 Acute Cystitis  AMS 2/2 above  Febrile in the .5  UA positive for UTI  Flu/COVID/RSV negative  11/29 BCx ESBL E.coli , repeat negative  11/29 UCx   50,000 - 99,000 CFU/mL Escherichia coli  S/p ertapenem x10 day course    Liver Mass w/ mets  ?Sacral OM  imaging studies noted in re: sacral findings--as there is no sacral wound present, suspect findings on sacral more related to mets from possible malignancy  S/p IR guided liver bx    Recommendations:  F/u liver path  Monitor off ABx  Trend temps/WBC  Monitor Hgb, transfusion prn protocol  Per Ortho no surgical intervention for fracture of sacrum    Additional care per primary team    Please call with any further questions.  Vanessa Saul M.D.  [facility] Division of Infectious Diseases [phone]  For after 5 P.M. and weekends, please call [phone]  Available on [employer] TEAMS

## 2024-12-17 NOTE — PROGRESS NOTE ADULT - ASSESSMENT
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

## 2024-12-17 NOTE — PROGRESS NOTE ADULT - SUBJECTIVE AND OBJECTIVE BOX
[facility]                                                       Dr. Bruce, Dr. Prabhakar, Dr. Cabrales, Dr. Zaragoza, Dr. Bingham, Dr. Loya                                      [facility], [facility], [address]                                                 [address] [address]                                      Ph: [phone]  Fax: [phone]                                         Ph: [phone]  Fax: [phone]      Patient is a 69y old  Male who presents with a chief complaint of AMS (01 Dec 2024 11:19)  Patient seen in follow up for CKD, h/o Kidney transplant.        PAST MEDICAL HISTORY:  Diabetes Mellitus Type II    ESRD on Dialysis    GERD (gastroesophageal reflux disease)    Depression    Hypothyroidism    Hyperlipidemia    Kidney transplanted    Hypertension    ANGELIKA (obstructive sleep apnea)    Peripheral neuropathy    Shoulder fracture      MEDICATIONS  (STANDING):  amLODIPine   Tablet 10 milliGRAM(s) Oral daily  ascorbic acid 500 milliGRAM(s) Oral two times a day  azaTHIOprine 50 milliGRAM(s) Oral two times a day  buPROPion XL (24-Hour) . 300 milliGRAM(s) Oral daily  carvedilol 12.5 milliGRAM(s) Oral every 12 hours  cloNIDine 0.1 milliGRAM(s) Oral three times a day  dextrose 5% + sodium chloride 0.9%. 1000 milliLiter(s) (50 mL/Hr) IV Continuous <Continuous>  dextrose 5%. 1000 milliLiter(s) (50 mL/Hr) IV Continuous <Continuous>  dextrose 5%. 1000 milliLiter(s) (100 mL/Hr) IV Continuous <Continuous>  dextrose 50% Injectable 25 Gram(s) IV Push once  dextrose 50% Injectable 12.5 Gram(s) IV Push once  dextrose 50% Injectable 25 Gram(s) IV Push once  escitalopram 10 milliGRAM(s) Oral <User Schedule>  ferrous    sulfate 325 milliGRAM(s) Oral two times a day  glucagon  Injectable 1 milliGRAM(s) IntraMuscular once  hydrALAZINE 100 milliGRAM(s) Oral three times a day  insulin glargine Injectable (LANTUS) 30 Unit(s) SubCutaneous at bedtime  insulin lispro (ADMELOG) corrective regimen sliding scale   SubCutaneous three times a day before meals  insulin lispro Injectable (ADMELOG) 7 Unit(s) SubCutaneous three times a day before meals  levothyroxine 88 MICROGram(s) Oral <User Schedule>  lisinopril 20 milliGRAM(s) Oral daily  mineral oil enema 133 milliLiter(s) Rectal once  pantoprazole    Tablet 40 milliGRAM(s) Oral two times a day  polyethylene glycol 3350 17 Gram(s) Oral daily  predniSONE   Tablet 20 milliGRAM(s) Oral daily  pyridoxine 50 milliGRAM(s) Oral daily  rosuvastatin 10 milliGRAM(s) Oral at bedtime  senna 2 Tablet(s) Oral at bedtime  tacrolimus 1 milliGRAM(s) Oral at bedtime  tacrolimus 2 milliGRAM(s) Oral daily    MEDICATIONS  (PRN):  aluminum hydroxide/magnesium hydroxide/simethicone Suspension 30 milliLiter(s) Oral every 4 hours PRN Dyspepsia  dextrose Oral Gel 15 Gram(s) Oral once PRN Blood Glucose LESS THAN 70 milliGRAM(s)/deciliter  hydrALAZINE Injectable 10 milliGRAM(s) IV Push every 8 hours PRN SBP >180 and HR 60-70bpm  melatonin 3 milliGRAM(s) Oral at bedtime PRN Insomnia  metoprolol tartrate Injectable 5 milliGRAM(s) IV Push every 6 hours PRN SBP >170  ondansetron Injectable 4 milliGRAM(s) IV Push every 8 hours PRN Nausea and/or Vomiting    T(C): 36.4 (12-17-24 @ 11:44), Max: 37.1 (12-16-24 @ 04:57)  HR: 62 (12-17-24 @ 11:44) (57 - 63)  BP: 119/56 (12-17-24 @ 11:44) (94/52 - 164/63)  RR: 18 (12-17-24 @ 11:44)  SpO2: 96% (12-17-24 @ 11:44)  Wt(kg): --  I&O's Detail                PHYSICAL EXAM:  General: No distress  Respiratory: b/l air entry  Cardiovascular: S1 S2  Gastrointestinal: soft  Extremities:  no edema          LABORATORY:                        8.2    5.00  )-----------( 319      ( 17 Dec 2024 07:59 )             25.2     12-17    136  |  104  |  31[H]  ----------------------------<  74  4.2   |  28  |  0.94    Ca    11.4[H]      17 Dec 2024 07:59    TPro  6.9  /  Alb  2.1[L]  /  TBili  0.3  /  DBili  x   /  AST  30  /  ALT  30  /  AlkPhos  111  12-16    Sodium: 136 mmol/L (12-17 @ 07:59)  Sodium: 135 mmol/L (12-16 @ 09:15)    Potassium: 4.2 mmol/L (12-17 @ 07:59)  Potassium: 4.7 mmol/L (12-16 @ 09:15)    Hemoglobin: 8.2 g/dL (12-17 @ 07:59)  Hemoglobin: 8.5 g/dL (12-16 @ 09:15)  Hemoglobin: 7.8 g/dL (12-15 @ 05:46)    Creatinine, Serum 0.94 (12-17 @ 07:59)  Creatinine, Serum 0.87 (12-16 @ 09:15)  Creatinine, Serum 0.92 (12-15 @ 05:46)        LIVER FUNCTIONS - ( 16 Dec 2024 09:15 )  Alb: 2.1 g/dL / Pro: 6.9 g/dL / ALK PHOS: 111 U/L / ALT: 30 U/L / AST: 30 U/L / GGT: x           Urinalysis Basic - ( 17 Dec 2024 07:59 )    Color: x / Appearance: x / SG: x / pH: x  Gluc: 74 mg/dL / Ketone: x  / Bili: x / Urobili: x   Blood: x / Protein: x / Nitrite: x   Leuk Esterase: x / RBC: x / WBC x   Sq Epi: x / Non Sq Epi: x / Bacteria: x

## 2024-12-18 VITALS
HEART RATE: 63 BPM | RESPIRATION RATE: 18 BRPM | SYSTOLIC BLOOD PRESSURE: 121 MMHG | OXYGEN SATURATION: 98 % | DIASTOLIC BLOOD PRESSURE: 60 MMHG

## 2024-12-18 LAB
ALBUMIN SERPL ELPH-MCNC: 2.3 G/DL — LOW (ref 3.3–5)
ALP SERPL-CCNC: 115 U/L — SIGNIFICANT CHANGE UP (ref 40–120)
ALT FLD-CCNC: 29 U/L — SIGNIFICANT CHANGE UP (ref 12–78)
ANION GAP SERPL CALC-SCNC: 7 MMOL/L — SIGNIFICANT CHANGE UP (ref 5–17)
AST SERPL-CCNC: 29 U/L — SIGNIFICANT CHANGE UP (ref 15–37)
BILIRUB SERPL-MCNC: 0.3 MG/DL — SIGNIFICANT CHANGE UP (ref 0.2–1.2)
BUN SERPL-MCNC: 36 MG/DL — HIGH (ref 7–23)
CALCIUM SERPL-MCNC: 11.3 MG/DL — HIGH (ref 8.5–10.1)
CHLORIDE SERPL-SCNC: 102 MMOL/L — SIGNIFICANT CHANGE UP (ref 96–108)
CO2 SERPL-SCNC: 25 MMOL/L — SIGNIFICANT CHANGE UP (ref 22–31)
CREAT SERPL-MCNC: 1.1 MG/DL — SIGNIFICANT CHANGE UP (ref 0.5–1.3)
EGFR: 74 ML/MIN/1.73M2 — SIGNIFICANT CHANGE UP
GLUCOSE BLDC GLUCOMTR-MCNC: 100 MG/DL — HIGH (ref 70–99)
GLUCOSE BLDC GLUCOMTR-MCNC: 197 MG/DL — HIGH (ref 70–99)
GLUCOSE SERPL-MCNC: 259 MG/DL — HIGH (ref 70–99)
HCT VFR BLD CALC: 26.4 % — LOW (ref 39–50)
HGB BLD-MCNC: 8.5 G/DL — LOW (ref 13–17)
MCHC RBC-ENTMCNC: 29.6 PG — SIGNIFICANT CHANGE UP (ref 27–34)
MCHC RBC-ENTMCNC: 32.2 G/DL — SIGNIFICANT CHANGE UP (ref 32–36)
MCV RBC AUTO: 92 FL — SIGNIFICANT CHANGE UP (ref 80–100)
NRBC # BLD: 0 /100 WBCS — SIGNIFICANT CHANGE UP (ref 0–0)
PLATELET # BLD AUTO: 310 K/UL — SIGNIFICANT CHANGE UP (ref 150–400)
POTASSIUM SERPL-MCNC: 3.8 MMOL/L — SIGNIFICANT CHANGE UP (ref 3.5–5.3)
POTASSIUM SERPL-SCNC: 3.8 MMOL/L — SIGNIFICANT CHANGE UP (ref 3.5–5.3)
PROT SERPL-MCNC: 6.8 G/DL — SIGNIFICANT CHANGE UP (ref 6–8.3)
RBC # BLD: 2.87 M/UL — LOW (ref 4.2–5.8)
RBC # FLD: 19 % — HIGH (ref 10.3–14.5)
SODIUM SERPL-SCNC: 134 MMOL/L — LOW (ref 135–145)
WBC # BLD: 4.24 K/UL — SIGNIFICANT CHANGE UP (ref 3.8–10.5)
WBC # FLD AUTO: 4.24 K/UL — SIGNIFICANT CHANGE UP (ref 3.8–10.5)

## 2024-12-18 PROCEDURE — 85610 PROTHROMBIN TIME: CPT

## 2024-12-18 PROCEDURE — 88307 TISSUE EXAM BY PATHOLOGIST: CPT

## 2024-12-18 PROCEDURE — 85014 HEMATOCRIT: CPT

## 2024-12-18 PROCEDURE — 71250 CT THORAX DX C-: CPT | Mod: MC

## 2024-12-18 PROCEDURE — 92610 EVALUATE SWALLOWING FUNCTION: CPT

## 2024-12-18 PROCEDURE — 86923 COMPATIBILITY TEST ELECTRIC: CPT

## 2024-12-18 PROCEDURE — 88273 CYTOGENETICS 10-30: CPT

## 2024-12-18 PROCEDURE — 86901 BLOOD TYPING SEROLOGIC RH(D): CPT

## 2024-12-18 PROCEDURE — 74176 CT ABD & PELVIS W/O CONTRAST: CPT | Mod: MC

## 2024-12-18 PROCEDURE — 86850 RBC ANTIBODY SCREEN: CPT

## 2024-12-18 PROCEDURE — 72190 X-RAY EXAM OF PELVIS: CPT

## 2024-12-18 PROCEDURE — 76376 3D RENDER W/INTRP POSTPROCES: CPT

## 2024-12-18 PROCEDURE — 93005 ELECTROCARDIOGRAM TRACING: CPT

## 2024-12-18 PROCEDURE — 88360 TUMOR IMMUNOHISTOCHEM/MANUAL: CPT

## 2024-12-18 PROCEDURE — 85027 COMPLETE CBC AUTOMATED: CPT

## 2024-12-18 PROCEDURE — 36415 COLL VENOUS BLD VENIPUNCTURE: CPT

## 2024-12-18 PROCEDURE — 87077 CULTURE AEROBIC IDENTIFY: CPT

## 2024-12-18 PROCEDURE — 82962 GLUCOSE BLOOD TEST: CPT

## 2024-12-18 PROCEDURE — 86803 HEPATITIS C AB TEST: CPT

## 2024-12-18 PROCEDURE — 85025 COMPLETE CBC W/AUTO DIFF WBC: CPT

## 2024-12-18 PROCEDURE — 87040 BLOOD CULTURE FOR BACTERIA: CPT

## 2024-12-18 PROCEDURE — 82306 VITAMIN D 25 HYDROXY: CPT

## 2024-12-18 PROCEDURE — 85018 HEMOGLOBIN: CPT

## 2024-12-18 PROCEDURE — 84155 ASSAY OF PROTEIN SERUM: CPT

## 2024-12-18 PROCEDURE — 76937 US GUIDE VASCULAR ACCESS: CPT

## 2024-12-18 PROCEDURE — 84165 PROTEIN E-PHORESIS SERUM: CPT

## 2024-12-18 PROCEDURE — 82105 ALPHA-FETOPROTEIN SERUM: CPT

## 2024-12-18 PROCEDURE — 83540 ASSAY OF IRON: CPT

## 2024-12-18 PROCEDURE — 85652 RBC SED RATE AUTOMATED: CPT

## 2024-12-18 PROCEDURE — 87150 DNA/RNA AMPLIFIED PROBE: CPT

## 2024-12-18 PROCEDURE — G0103: CPT

## 2024-12-18 PROCEDURE — 77012 CT SCAN FOR NEEDLE BIOPSY: CPT

## 2024-12-18 PROCEDURE — 85730 THROMBOPLASTIN TIME PARTIAL: CPT

## 2024-12-18 PROCEDURE — 97166 OT EVAL MOD COMPLEX 45 MIN: CPT

## 2024-12-18 PROCEDURE — 84156 ASSAY OF PROTEIN URINE: CPT

## 2024-12-18 PROCEDURE — 82164 ANGIOTENSIN I ENZYME TEST: CPT

## 2024-12-18 PROCEDURE — 88185 FLOWCYTOMETRY/TC ADD-ON: CPT

## 2024-12-18 PROCEDURE — 83521 IG LIGHT CHAINS FREE EACH: CPT

## 2024-12-18 PROCEDURE — 36573 INSJ PICC RS&I 5 YR+: CPT

## 2024-12-18 PROCEDURE — P9040: CPT

## 2024-12-18 PROCEDURE — 97110 THERAPEUTIC EXERCISES: CPT

## 2024-12-18 PROCEDURE — 36430 TRANSFUSION BLD/BLD COMPNT: CPT

## 2024-12-18 PROCEDURE — 83970 ASSAY OF PARATHORMONE: CPT

## 2024-12-18 PROCEDURE — 97530 THERAPEUTIC ACTIVITIES: CPT

## 2024-12-18 PROCEDURE — 99239 HOSP IP/OBS DSCHRG MGMT >30: CPT

## 2024-12-18 PROCEDURE — 83605 ASSAY OF LACTIC ACID: CPT

## 2024-12-18 PROCEDURE — 86706 HEP B SURFACE ANTIBODY: CPT

## 2024-12-18 PROCEDURE — 83519 RIA NONANTIBODY: CPT

## 2024-12-18 PROCEDURE — 80202 ASSAY OF VANCOMYCIN: CPT

## 2024-12-18 PROCEDURE — 82784 ASSAY IGA/IGD/IGG/IGM EACH: CPT

## 2024-12-18 PROCEDURE — 87637 SARSCOV2&INF A&B&RSV AMP PRB: CPT

## 2024-12-18 PROCEDURE — C1751: CPT

## 2024-12-18 PROCEDURE — 76705 ECHO EXAM OF ABDOMEN: CPT

## 2024-12-18 PROCEDURE — 87899 AGENT NOS ASSAY W/OPTIC: CPT

## 2024-12-18 PROCEDURE — 71045 X-RAY EXAM CHEST 1 VIEW: CPT

## 2024-12-18 PROCEDURE — 74181 MRI ABDOMEN W/O CONTRAST: CPT | Mod: MC

## 2024-12-18 PROCEDURE — 88184 FLOWCYTOMETRY/ TC 1 MARKER: CPT

## 2024-12-18 PROCEDURE — 99232 SBSQ HOSP IP/OBS MODERATE 35: CPT

## 2024-12-18 PROCEDURE — 93306 TTE W/DOPPLER COMPLETE: CPT

## 2024-12-18 PROCEDURE — 87340 HEPATITIS B SURFACE AG IA: CPT

## 2024-12-18 PROCEDURE — 86704 HEP B CORE ANTIBODY TOTAL: CPT

## 2024-12-18 PROCEDURE — 88341 IMHCHEM/IMCYTCHM EA ADD ANTB: CPT

## 2024-12-18 PROCEDURE — 82652 VIT D 1 25-DIHYDROXY: CPT

## 2024-12-18 PROCEDURE — 72192 CT PELVIS W/O DYE: CPT | Mod: MC

## 2024-12-18 PROCEDURE — 97116 GAIT TRAINING THERAPY: CPT

## 2024-12-18 PROCEDURE — 72195 MRI PELVIS W/O DYE: CPT | Mod: MC

## 2024-12-18 PROCEDURE — 86900 BLOOD TYPING SEROLOGIC ABO: CPT

## 2024-12-18 PROCEDURE — 97162 PT EVAL MOD COMPLEX 30 MIN: CPT

## 2024-12-18 PROCEDURE — 81001 URINALYSIS AUTO W/SCOPE: CPT

## 2024-12-18 PROCEDURE — 84100 ASSAY OF PHOSPHORUS: CPT

## 2024-12-18 PROCEDURE — 80048 BASIC METABOLIC PNL TOTAL CA: CPT

## 2024-12-18 PROCEDURE — 97535 SELF CARE MNGMENT TRAINING: CPT

## 2024-12-18 PROCEDURE — 88237 TISSUE CULTURE BONE MARROW: CPT

## 2024-12-18 PROCEDURE — 80053 COMPREHEN METABOLIC PANEL: CPT

## 2024-12-18 PROCEDURE — 83036 HEMOGLOBIN GLYCOSYLATED A1C: CPT

## 2024-12-18 PROCEDURE — 87205 SMEAR GRAM STAIN: CPT

## 2024-12-18 PROCEDURE — 47000 NEEDLE BIOPSY OF LIVER PERQ: CPT

## 2024-12-18 PROCEDURE — 88365 INSITU HYBRIDIZATION (FISH): CPT

## 2024-12-18 PROCEDURE — 86335 IMMUNFIX E-PHORSIS/URINE/CSF: CPT

## 2024-12-18 PROCEDURE — 0001U RBC DNA HEA 35 AG 11 BLD GRP: CPT

## 2024-12-18 PROCEDURE — 87186 SC STD MICRODIL/AGAR DIL: CPT

## 2024-12-18 PROCEDURE — 88342 IMHCHEM/IMCYTCHM 1ST ANTB: CPT

## 2024-12-18 PROCEDURE — 82728 ASSAY OF FERRITIN: CPT

## 2024-12-18 PROCEDURE — 88364 INSITU HYBRIDIZATION (FISH): CPT

## 2024-12-18 PROCEDURE — 86334 IMMUNOFIX E-PHORESIS SERUM: CPT

## 2024-12-18 PROCEDURE — 83550 IRON BINDING TEST: CPT

## 2024-12-18 PROCEDURE — 83615 LACTATE (LD) (LDH) ENZYME: CPT

## 2024-12-18 PROCEDURE — A9579: CPT

## 2024-12-18 PROCEDURE — 99285 EMERGENCY DEPT VISIT HI MDM: CPT

## 2024-12-18 PROCEDURE — 84132 ASSAY OF SERUM POTASSIUM: CPT

## 2024-12-18 PROCEDURE — 83735 ASSAY OF MAGNESIUM: CPT

## 2024-12-18 PROCEDURE — 84166 PROTEIN E-PHORESIS/URINE/CSF: CPT

## 2024-12-18 PROCEDURE — 96374 THER/PROPH/DIAG INJ IV PUSH: CPT

## 2024-12-18 PROCEDURE — 74183 MRI ABD W/O CNTR FLWD CNTR: CPT | Mod: MC

## 2024-12-18 PROCEDURE — 88300 SURGICAL PATH GROSS: CPT

## 2024-12-18 PROCEDURE — 72220 X-RAY EXAM SACRUM TAILBONE: CPT

## 2024-12-18 PROCEDURE — 80197 ASSAY OF TACROLIMUS: CPT

## 2024-12-18 PROCEDURE — 86140 C-REACTIVE PROTEIN: CPT

## 2024-12-18 PROCEDURE — 96375 TX/PRO/DX INJ NEW DRUG ADDON: CPT

## 2024-12-18 PROCEDURE — 87086 URINE CULTURE/COLONY COUNT: CPT

## 2024-12-18 RX ORDER — 0.9 % SODIUM CHLORIDE 0.9 %
1000 INTRAVENOUS SOLUTION INTRAVENOUS
Refills: 0 | Status: DISCONTINUED | OUTPATIENT
Start: 2024-12-18 | End: 2024-12-18

## 2024-12-18 RX ORDER — INSULIN ASPART 100 [IU]/ML
12 INJECTION, SOLUTION INTRAVENOUS; SUBCUTANEOUS
Qty: 0 | Refills: 0 | DISCHARGE

## 2024-12-18 RX ORDER — CLONIDINE HYDROCHLORIDE 0.3 MG/1
1 TABLET ORAL
Qty: 0 | Refills: 0 | DISCHARGE
Start: 2024-12-18

## 2024-12-18 RX ORDER — LACTULOSE 10 G/15ML
30 SOLUTION ORAL ONCE
Refills: 0 | Status: COMPLETED | OUTPATIENT
Start: 2024-12-18 | End: 2024-12-18

## 2024-12-18 RX ORDER — SENNOSIDES 8.6 MG
2 TABLET ORAL
Qty: 0 | Refills: 0 | DISCHARGE
Start: 2024-12-18

## 2024-12-18 RX ADMIN — ESCITALOPRAM OXALATE 10 MILLIGRAM(S): 10 TABLET, FILM COATED ORAL at 17:20

## 2024-12-18 RX ADMIN — Medication 2: at 11:57

## 2024-12-18 RX ADMIN — Medication 300 MILLIGRAM(S): at 11:59

## 2024-12-18 RX ADMIN — PANTOPRAZOLE SODIUM 40 MILLIGRAM(S): 40 TABLET, DELAYED RELEASE ORAL at 05:28

## 2024-12-18 RX ADMIN — AMLODIPINE BESYLATE 10 MILLIGRAM(S): 10 TABLET ORAL at 05:24

## 2024-12-18 RX ADMIN — Medication 325 MILLIGRAM(S): at 17:20

## 2024-12-18 RX ADMIN — CARVEDILOL 12.5 MILLIGRAM(S): 25 TABLET, FILM COATED ORAL at 05:24

## 2024-12-18 RX ADMIN — AZATHIOPRINE 50 MILLIGRAM(S): 100 TABLET ORAL at 05:28

## 2024-12-18 RX ADMIN — TACROLIMUS 2 MILLIGRAM(S): 5 CAPSULE ORAL at 11:58

## 2024-12-18 RX ADMIN — Medication 325 MILLIGRAM(S): at 05:24

## 2024-12-18 RX ADMIN — ESCITALOPRAM OXALATE 10 MILLIGRAM(S): 10 TABLET, FILM COATED ORAL at 05:27

## 2024-12-18 RX ADMIN — POLYETHYLENE GLYCOL 3350 17 GRAM(S): 17 POWDER, FOR SOLUTION ORAL at 01:42

## 2024-12-18 RX ADMIN — LISINOPRIL 20 MILLIGRAM(S): 20 TABLET ORAL at 05:24

## 2024-12-18 RX ADMIN — Medication 50 MILLIGRAM(S): at 11:58

## 2024-12-18 RX ADMIN — HYDRALAZINE HYDROCHLORIDE 100 MILLIGRAM(S): 10 TABLET ORAL at 05:23

## 2024-12-18 RX ADMIN — Medication 88 MICROGRAM(S): at 05:24

## 2024-12-18 RX ADMIN — Medication 7 UNIT(S): at 07:43

## 2024-12-18 RX ADMIN — LACTULOSE 30 GRAM(S): 10 SOLUTION ORAL at 12:42

## 2024-12-18 RX ADMIN — CLONIDINE HYDROCHLORIDE 0.1 MILLIGRAM(S): 0.3 TABLET ORAL at 05:24

## 2024-12-18 RX ADMIN — Medication 8: at 07:43

## 2024-12-18 RX ADMIN — AZATHIOPRINE 50 MILLIGRAM(S): 100 TABLET ORAL at 17:21

## 2024-12-18 RX ADMIN — PANTOPRAZOLE SODIUM 40 MILLIGRAM(S): 40 TABLET, DELAYED RELEASE ORAL at 17:21

## 2024-12-18 RX ADMIN — CLONIDINE HYDROCHLORIDE 0.1 MILLIGRAM(S): 0.3 TABLET ORAL at 13:44

## 2024-12-18 RX ADMIN — Medication 500 MILLIGRAM(S): at 05:24

## 2024-12-18 RX ADMIN — CARVEDILOL 12.5 MILLIGRAM(S): 25 TABLET, FILM COATED ORAL at 17:20

## 2024-12-18 RX ADMIN — Medication 10 UNIT(S): at 11:58

## 2024-12-18 RX ADMIN — PREDNISONE 20 MILLIGRAM(S): 20 TABLET ORAL at 05:24

## 2024-12-18 RX ADMIN — Medication 30 MILLILITER(S): at 00:20

## 2024-12-18 RX ADMIN — HYDRALAZINE HYDROCHLORIDE 100 MILLIGRAM(S): 10 TABLET ORAL at 13:44

## 2024-12-18 RX ADMIN — Medication 500 MILLIGRAM(S): at 17:20

## 2024-12-18 RX ADMIN — ESCITALOPRAM OXALATE 10 MILLIGRAM(S): 10 TABLET, FILM COATED ORAL at 13:34

## 2024-12-18 RX ADMIN — Medication 10 UNIT(S): at 16:45

## 2024-12-18 NOTE — PROVIDER CONTACT NOTE (OTHER) - SITUATION
Patient complaining of constipation and abdominal discomfort
Rectal temp of 102.7 and Blood pressure of 188/68
Blood pressure 185/80

## 2024-12-18 NOTE — PROVIDER CONTACT NOTE (OTHER) - REASON
Blood pressure 185/80
Patient complaining of constipation and abdominal discomfort
Rectal temp of 102.7 and Blood pressure of 188/68

## 2024-12-18 NOTE — PROGRESS NOTE ADULT - REASON FOR ADMISSION
AMS

## 2024-12-18 NOTE — PROGRESS NOTE ADULT - SUBJECTIVE AND OBJECTIVE BOX
[INTERVAL HX: ]  Patient seen and examined;  Chart reviewed and events noted;     pt with no CP, no SOB  no abd pain  awake , alert but questinable ability to understand complex health issues and decision making.   Spoke  with pt wife Ludmila.   Pt with decline in health over last 2mo.   No with possible underlying cancer. Discussed GOC.     [MEDICATIONS]  MEDICATIONS  (STANDING):  amLODIPine   Tablet 10 milliGRAM(s) Oral daily  ascorbic acid 500 milliGRAM(s) Oral two times a day  azaTHIOprine 50 milliGRAM(s) Oral two times a day  buPROPion XL (24-Hour) . 300 milliGRAM(s) Oral daily  carvedilol 12.5 milliGRAM(s) Oral every 12 hours  cloNIDine 0.1 milliGRAM(s) Oral three times a day  dextrose 5%. 1000 milliLiter(s) (100 mL/Hr) IV Continuous <Continuous>  dextrose 5%. 1000 milliLiter(s) (50 mL/Hr) IV Continuous <Continuous>  dextrose 50% Injectable 25 Gram(s) IV Push once  dextrose 50% Injectable 12.5 Gram(s) IV Push once  dextrose 50% Injectable 25 Gram(s) IV Push once  escitalopram 10 milliGRAM(s) Oral <User Schedule>  ferrous    sulfate 325 milliGRAM(s) Oral two times a day  glucagon  Injectable 1 milliGRAM(s) IntraMuscular once  hydrALAZINE 100 milliGRAM(s) Oral three times a day  insulin glargine Injectable (LANTUS) 30 Unit(s) SubCutaneous at bedtime  insulin lispro (ADMELOG) corrective regimen sliding scale   SubCutaneous three times a day before meals  insulin lispro Injectable (ADMELOG) 10 Unit(s) SubCutaneous three times a day before meals  levothyroxine 88 MICROGram(s) Oral <User Schedule>  lisinopril 20 milliGRAM(s) Oral daily  mineral oil enema 133 milliLiter(s) Rectal once  pantoprazole    Tablet 40 milliGRAM(s) Oral two times a day  polyethylene glycol 3350 17 Gram(s) Oral daily  predniSONE   Tablet 20 milliGRAM(s) Oral daily  pyridoxine 50 milliGRAM(s) Oral daily  rosuvastatin 10 milliGRAM(s) Oral at bedtime  senna 2 Tablet(s) Oral at bedtime  tacrolimus 1 milliGRAM(s) Oral at bedtime  tacrolimus 2 milliGRAM(s) Oral daily    MEDICATIONS  (PRN):  aluminum hydroxide/magnesium hydroxide/simethicone Suspension 30 milliLiter(s) Oral every 4 hours PRN Dyspepsia  dextrose Oral Gel 15 Gram(s) Oral once PRN Blood Glucose LESS THAN 70 milliGRAM(s)/deciliter  hydrALAZINE Injectable 10 milliGRAM(s) IV Push every 8 hours PRN SBP >180 and HR 60-70bpm  melatonin 3 milliGRAM(s) Oral at bedtime PRN Insomnia  metoprolol tartrate Injectable 5 milliGRAM(s) IV Push every 6 hours PRN SBP >170  ondansetron Injectable 4 milliGRAM(s) IV Push every 8 hours PRN Nausea and/or Vomiting      [VITALS]  Vital Signs Last 24 Hrs  T(C): 37 (18 Dec 2024 13:39), Max: 37 (18 Dec 2024 13:39)  T(F): 98.6 (18 Dec 2024 13:39), Max: 98.6 (18 Dec 2024 13:39)  HR: 63 (18 Dec 2024 17:13) (63 - 68)  BP: 121/- (18 Dec 2024 17:13) (119/58 - 160/54)  BP(mean): --  RR: 18 (18 Dec 2024 17:13) (18 - 18)  SpO2: 98% (18 Dec 2024 17:13) (97% - 100%)    Parameters below as of 18 Dec 2024 17:13  Patient On (Oxygen Delivery Method): room air      [WT/HT]  Daily     Daily   [VENT]      [PHYSICAL EXAM]  GEN: NAD, awake alert  HEENT: normocephalic and atraumatic. EOMI.     NECK: Supple.  No lymphadenopathy   LUNGS: Clear to auscultation.  HEART: Regular rate and rhythm,  no MRG  ABDOMEN: Soft, nontender, and nondistended.  Positive bowel sounds.    : No CVA tenderness  EXTREMITIES: Without edema.  NEUROLOGIC: grossly intact.  PSYCHIATRIC: Appropriate affect .  SKIN: No rash     [LABS:]                        8.5    4.24  )-----------( 310      ( 18 Dec 2024 09:22 )             26.4     12-18  134[L]  |  102  |  36[H]  ----------------------------<  259[H]  3.8   |  25  |  1.10    Ca    11.3[H]      18 Dec 2024 09:22  TPro  6.8  /  Alb  2.3[L]  /  TBili  0.3  /  DBili  x   /  AST  29  /  ALT  29  /  AlkPhos  115  12-18      Protein Electrophoresis, Urine (12-07-24 @ 05:00)  Immunofixation, Urine: Weak Bence Hernandez protein Kappa type  Urine Electrophoresis Interpretation: Weak Beta-Migrating Paraprotein Identified    Iron - Total Binding Capacity.: 182 ug/dL *L* [220 - 430] (12-05-24 @ 10:00)  Ferritin: 973 ng/mL *H* [30 - 400] (12-05-24 @ 10:00)    Serum Protein Electrophoresis Interp: Duplicate Sample (12-04-24 @ 16:06)  Serum Protein Electrophoresis Interp: Weak Beta Migrating Paraprotein Identified (12-02-24 @ 11:50)    Ferritin: 885 ng/mL *H* [30 - 400] (12-01-24 @ 05:55)  Iron - Total Binding Capacity.: 164 ug/dL *L* [220 - 430] (12-01-24 @ 05:55)    Serum Protein Electrophoresis Interp: Weak Beta Migrating Paraprotein Identified (11-29-24 @ 17:10)    Immunofixation, Serum:   Weak IgM Lambda Band Identified    Reference Range: None Detected (11-29-24 @ 17:10)  Sedimentation Rate, Erythrocyte: 117 mm/hr *H* [0 - 20] (11-29-24 @ 16:05)    Urinalysis Basic - ( 18 Dec 2024 09:22 )  Color: x / Appearance: x / SG: x / pH: x  Gluc: 259 mg/dL / Ketone: x  / Bili: x / Urobili: x   Blood: x / Protein: x / Nitrite: x   Leuk Esterase: x / RBC: x / WBC x   Sq Epi: x / Non Sq Epi: x / Bacteria: x        [RADIOLOGY STUDIES:]

## 2024-12-18 NOTE — PROGRESS NOTE ADULT - SUBJECTIVE AND OBJECTIVE BOX
[facility] -- Tom Rahman Pannella, Patel, Savella, Goodger, Cohen: Office # [phone]    Follow Up:  Uncontrolled HTN, Pericardial Effusion     Subjective/Observations: Patient awake, alert, resting in bed. Denies chest pain, palpitations and dizziness. Denies any difficulty breathing. No orthopnea and PND. Tolerating room air.     REVIEW OF SYSTEMS: All other review of systems are negative unless indicated above    PAST MEDICAL & SURGICAL HISTORY:  Diabetes Mellitus Type II  Insulin pump (2010)      GERD (gastroesophageal reflux disease)      Depression      Hypothyroidism      Hypothyroidism      Hyperlipidemia      Kidney transplanted  2012      Hypertension      Hypertension      ANGELIKA (obstructive sleep apnea)      Peripheral neuropathy      Shoulder fracture  Left.      History of colonoscopy      S/P kidney transplant  2012      S/P cholecystectomy      Retroperitoneal fibrosis  s/p surgery      AV fistula  Right arm      S/P right cataract extraction      S/P left cataract extraction      H/O unilateral nephrectomy  Left          MEDICATIONS  (STANDING):  amLODIPine   Tablet 10 milliGRAM(s) Oral daily  ascorbic acid 500 milliGRAM(s) Oral two times a day  azaTHIOprine 50 milliGRAM(s) Oral two times a day  buPROPion XL (24-Hour) . 300 milliGRAM(s) Oral daily  carvedilol 12.5 milliGRAM(s) Oral every 12 hours  cloNIDine 0.1 milliGRAM(s) Oral three times a day  dextrose 5% + sodium chloride 0.9%. 1000 milliLiter(s) (50 mL/Hr) IV Continuous <Continuous>  dextrose 5%. 1000 milliLiter(s) (50 mL/Hr) IV Continuous <Continuous>  dextrose 5%. 1000 milliLiter(s) (100 mL/Hr) IV Continuous <Continuous>  dextrose 50% Injectable 25 Gram(s) IV Push once  dextrose 50% Injectable 12.5 Gram(s) IV Push once  dextrose 50% Injectable 25 Gram(s) IV Push once  escitalopram 10 milliGRAM(s) Oral <User Schedule>  ferrous    sulfate 325 milliGRAM(s) Oral two times a day  glucagon  Injectable 1 milliGRAM(s) IntraMuscular once  hydrALAZINE 100 milliGRAM(s) Oral three times a day  insulin glargine Injectable (LANTUS) 30 Unit(s) SubCutaneous at bedtime  insulin lispro (ADMELOG) corrective regimen sliding scale   SubCutaneous three times a day before meals  insulin lispro Injectable (ADMELOG) 7 Unit(s) SubCutaneous three times a day before meals  levothyroxine 88 MICROGram(s) Oral <User Schedule>  lisinopril 20 milliGRAM(s) Oral daily  mineral oil enema 133 milliLiter(s) Rectal once  pantoprazole    Tablet 40 milliGRAM(s) Oral two times a day  polyethylene glycol 3350 17 Gram(s) Oral daily  predniSONE   Tablet 20 milliGRAM(s) Oral daily  pyridoxine 50 milliGRAM(s) Oral daily  rosuvastatin 10 milliGRAM(s) Oral at bedtime  senna 2 Tablet(s) Oral at bedtime  tacrolimus 1 milliGRAM(s) Oral at bedtime  tacrolimus 2 milliGRAM(s) Oral daily    MEDICATIONS  (PRN):  aluminum hydroxide/magnesium hydroxide/simethicone Suspension 30 milliLiter(s) Oral every 4 hours PRN Dyspepsia  dextrose Oral Gel 15 Gram(s) Oral once PRN Blood Glucose LESS THAN 70 milliGRAM(s)/deciliter  hydrALAZINE Injectable 10 milliGRAM(s) IV Push every 8 hours PRN SBP >180 and HR 60-70bpm  melatonin 3 milliGRAM(s) Oral at bedtime PRN Insomnia  metoprolol tartrate Injectable 5 milliGRAM(s) IV Push every 6 hours PRN SBP >170  ondansetron Injectable 4 milliGRAM(s) IV Push every 8 hours PRN Nausea and/or Vomiting    Allergies    No Known Allergies    Intolerances      Vital Signs Last 24 Hrs  T(C): 36.9 (18 Dec 2024 04:55), Max: 36.9 (18 Dec 2024 04:55)  T(F): 98.4 (18 Dec 2024 04:55), Max: 98.4 (18 Dec 2024 04:55)  HR: 68 (18 Dec 2024 04:55) (62 - 68)  BP: 160/54 (18 Dec 2024 04:55) (108/64 - 160/54)  BP(mean): --  RR: 18 (18 Dec 2024 04:55) (18 - 18)  SpO2: 97% (18 Dec 2024 04:55) (96% - 98%)    Parameters below as of 18 Dec 2024 04:55  Patient On (Oxygen Delivery Method): room air      I&O's Summary        TELE: Not on telemetry   PHYSICAL EXAM:  Constitutional: NAD, awake and alert  HEENT: Moist Mucous Membranes, Anicteric  Pulmonary: Non-labored, breath sounds are clear bilaterally, No wheezing, rales or rhonchi  Cardiovascular: Regular, S1 and S2, No murmurs, No rubs, gallops or clicks  Gastrointestinal:  soft, nontender, nondistended   Lymph: No peripheral edema. No lymphadenopathy.   Skin: No visible rashes or ulcers.  Psych:  Mood & affect appropriate      LABS: All Labs Reviewed:                        8.2    5.00  )-----------( 319      ( 17 Dec 2024 07:59 )             25.2                         8.5    4.52  )-----------( 336      ( 16 Dec 2024 09:15 )             26.3     17 Dec 2024 07:59    136    |  104    |  31     ----------------------------<  74     4.2     |  28     |  0.94   16 Dec 2024 09:15    135    |  103    |  34     ----------------------------<  117    4.7     |  25     |  0.87     Ca    11.4       17 Dec 2024 07:59  Ca    11.3       16 Dec 2024 09:15    TPro  6.9    /  Alb  2.1    /  TBili  0.3    /  DBili  x      /  AST  30     /  ALT  30     /  AlkPhos  111    16 Dec 2024 09:15   LIVER FUNCTIONS - ( 16 Dec 2024 09:15 )  Alb: 2.1 g/dL / Pro: 6.9 g/dL / ALK PHOS: 111 U/L / ALT: 30 U/L / AST: 30 U/L / GGT: x             12 Lead ECG:   Ventricular Rate 81 BPM    Atrial Rate 81 BPM    P-R Interval 148 ms    QRS Duration 104 ms    Q-T Interval 406 ms    QTC Calculation(Bazett) 471 ms    P Axis 44 degrees    R Axis 12 degrees    T Axis 53 degrees    Diagnosis Line Normal sinus rhythm  Normal ECG  When compared with ECG of 29-NOV-2024 07:11,  Nonspecific T wave abnormality, improved in Lateral leads  Confirmed by Kya Gonzalez ([id number]) on 12/1/2024 10:43:18 AM (12-01-24 @ 09:28)      TRANSTHORACIC ECHOCARDIOGRAM REPORT  ________________________________________________________________________________                                      _______       Pt. Name:       JAN CALVIN Study Date:    11/29/2024  MRN:            [id number]          YOB: 1955  Accession #:    [id number]         Age:           69 years  Account#:       [id number]        Gender:        M  Heart Rate:                       Height:        64.17 in (163.00 cm)  Rhythm:       Weight:        169.75 lb (77.00 kg)  Blood Pressure: 196/81 mmHg       BSA/BMI:       1.83 m² / 28.98 kg/m²  ________________________________________________________________________________________  Referring Physician:    [id number] Ale Ibrahim  Interpreting Physician: Kya Gonzalez MD  Primary Sonographer:    Butch Salazar    CPT:               ECHO TTE WO CON COMP W DOPP - 65776.m  Indication(s):     Cardiac murmur, unspecified - R01.1  Procedure:         Transthoracic echocardiogram with 2-D, M-mode and complete                     spectral and color flow Doppler.  Ordering Location: [facility]  Admission Status:  ED    _______________________________________________________________________________________     CONCLUSIONS:      1. Left ventricular cavity is normal in size. Left ventricular systolic function is normal with an ejection fraction of 60 % by Moreno's method of disks.   2. Trace pericardial effusion noted adjacent to the posterior left ventricle.   3. Normal right ventricular cavity size and normal right ventricular systolic function.   4. Aortic valve anatomy cannot be determined with normal systolic excursion. There is calcification of the aortic valve leaflets.   5. Structurally normal mitral valve with normalleaflet excursion.   6. Structurally normal tricuspid valve with normal leaflet excursion. Trace tricuspid regurgitation.   7. The inferior vena cava is normal in size measuring 1.36 cm in diameter, (normal <2.1cm) with normal inspiratory collapse (normal >50%) consistent with normal right atrial pressure (~3, range 0-5mmHg).    ________________________________________________________________________________________  FINDINGS:     Left Ventricle:  The left ventricular cavity is normal in size. Left ventricular systolic function is normal with a calculated ejection fraction of 60 % by the Moreno's biplane method of disks.     Right Ventricle:  The right ventricular cavity is normal in size and right ventricular systolic function is normal. Tricuspid annular plane systolic excursion (TAPSE) is 2.9 cm (normal >=1.7 cm).     Left Atrium:  The left atrium is normal in size with an indexed volume of 33.15 ml/m².     Right Atrium:  The right atrium is normal in size with an indexed volume of 21.17 ml/m².     Interatrial Septum:  The interatrial septum appears intact.     Aortic Valve:  The aortic valve anatomy cannot be determined with normal systolic excursion. There is calcification of the aortic valve leaflets. The peak transaortic velocity is 2.15 m/s, peak transaortic gradient is 18.5 mmHg and mean transaortic gradient is 11.0 mmHg with an LVOT/aortic valve VTI ratio of 0.64. The aortic valve area is estimated at 2.67 cm² by the continuity equation. There is no evidence of aortic regurgitation.     Mitral Valve:  Structurally normal mitral valve with normal leaflet excursion. There is calcification of the mitral valve annulus.     Tricuspid Valve:  Structurally normal tricuspid valve with normal leaflet excursion. There is trace tricuspid regurgitation. Estimated pulmonary artery systolic pressure is 8 mmHg.     Aorta:  The aortic root at the sinuses of Valsalva is normal in size, measuring 3.50 cm (indexed 1.91 cm/m²). The ascending aorta is dilated, measuring 4.10 cm (indexed 2.24 cm/m²).     Pericardium:  There is a trace pericardial effusion noted adjacent to the posterior left ventricle.     Systemic Veins:  The inferior vena cava is normal in size measuring 1.36 cm in diameter, (normal <2.1cm) with normal inspiratory collapse (normal >50%) consistent with normal right atrial pressure (~3, range 0-5mmHg).  ____________________________________________________________________  QUANTITATIVE DATA:  Left Ventricle Measurements: (Indexed to BSA)     IVSd (2D):   1.0 cm  LVPWd (2D):  1.0 cm  LVIDd (2D):  5.3 cm  LVIDs (2D):  3.6 cm  LV Mass:     203 g  111.2 g/m²  LV Vol d, MOD A2C: 97.8 ml  53.50 ml/m²  LV Vol d, MOD A4C: 121.0 ml 66.19 ml/m²  LV Vol d, MOD BP:  109.5 ml 59.90 ml/m²  LV Vol s, MOD A2C: 36.4 ml  19.91 ml/m²  LV Vol s, MOD A4C: 49.5 ml  27.08 ml/m²  LV Vol s, MOD BP:  44.0 ml  24.04 ml/m²  LVOT SV MOD BP:    65.5 ml  LV EF% MOD BP:     60 %     MV E Vmax:    1.02 m/s  MV A Vmax:    0.93 m/s  MV E/A:       1.10  e' lateral:   13.10 cm/s  e' medial:    9.25 cm/s  E/e' lateral: 7.79  E/e' medial:  11.03  E/e' Average: 9.13  MV DT:        151 msec    Aorta Measurements: (Normal range) (Indexed to BSA)     Ao Root d     3.50 cm (3.1 - 3.7 cm) 1.91 cm/m²  Ao Asc d, 2D: 4.10  Ao Asc prox:  4.10 cm               2.24 cm/m²            Left Atrium Measurements: (Indexed to BSA)  LA Diam 2D: 4.40 cm         Right Ventricle Measurements: Right Atrial Measurements:     TAPSE:            2.9 cm      RA Vol:            38.70 ml  RV Base (RVID1):  3.7cm      RA Vol s, MOD A4C  38.7 ml  RV Mid (RVID2):   3.1 cm      RA Vol Index:      21.17 ml/m²  RV Major (RVID3): 8.0 cm      RA Area s, MOD A4C 14.7 cm²       LVOT / RVOT/ Qp/Qs Data: (Indexed to BSA)  LVOT Diameter:  2.30 cm  LVOT Area:      4.15cm²  LVOT Vmax:      1.34 m/s  LVOT Vmn:       0.949 m/s  LVOT VTI:       26.30 cm  LVOT peak grad: 7 mmHg  LVOT mean grad: 4.0 mmHg  LVOT SV:        109.3 ml  59.78 ml/m²    Aortic Valve Measurements:  AV Vmax:                2.2 m/s  AV Peak Gradient:       18.5 mmHg  AV Mean Gradient:       11.0 mmHg  AV VTI:                 41.0 cm  AV VTI Ratio:           0.64  AoV EOA, Contin:        2.67 cm²  AoV EOA, Contin i:      1.46 cm²/m²  AoV Dimensionless Index 0.64    Mitral Valve Measurements:     MV E Vmax: 1.0 m/s  MV A Vmax: 0.9 m/s  MV E/A:    1.1       Tricuspid Valve Measurements:     TR Vmax:          1.2 m/s  TR Peak Gradient: 5.3 mmHg  RA Pressure:      3 mmHg  PASP:             8 mmHg    ________________________________________________________________________________________  Electronically signed on 11/30/2024 at 1:57:15 PM by Kya Gonzalez MD         *** Final ***   [facility] -- Tom Rahman Pannella, Patel, Savella, Goodger, Cohen: Office # [phone]    Follow Up:  Uncontrolled HTN, Pericardial Effusion     Subjective/Observations: Patient awake, alert, resting in bed. Denies chest pain, palpitations and dizziness. Denies any difficulty breathing. No orthopnea and PND. Tolerating room air.     REVIEW OF SYSTEMS: All other review of systems are negative unless indicated above    PAST MEDICAL & SURGICAL HISTORY:  Diabetes Mellitus Type II  Insulin pump (2010)      GERD (gastroesophageal reflux disease)      Depression      Hypothyroidism      Hypothyroidism      Hyperlipidemia      Kidney transplanted  2012      Hypertension      Hypertension      ANGELIKA (obstructive sleep apnea)      Peripheral neuropathy      Shoulder fracture  Left.      History of colonoscopy      S/P kidney transplant  2012      S/P cholecystectomy      Retroperitoneal fibrosis  s/p surgery      AV fistula  Right arm      S/P right cataract extraction      S/P left cataract extraction      H/O unilateral nephrectomy  Left          MEDICATIONS  (STANDING):  amLODIPine   Tablet 10 milliGRAM(s) Oral daily  ascorbic acid 500 milliGRAM(s) Oral two times a day  azaTHIOprine 50 milliGRAM(s) Oral two times a day  buPROPion XL (24-Hour) . 300 milliGRAM(s) Oral daily  carvedilol 12.5 milliGRAM(s) Oral every 12 hours  cloNIDine 0.1 milliGRAM(s) Oral three times a day  dextrose 5% + sodium chloride 0.9%. 1000 milliLiter(s) (50 mL/Hr) IV Continuous <Continuous>  dextrose 5%. 1000 milliLiter(s) (50 mL/Hr) IV Continuous <Continuous>  dextrose 5%. 1000 milliLiter(s) (100 mL/Hr) IV Continuous <Continuous>  dextrose 50% Injectable 25 Gram(s) IV Push once  dextrose 50% Injectable 12.5 Gram(s) IV Push once  dextrose 50% Injectable 25 Gram(s) IV Push once  escitalopram 10 milliGRAM(s) Oral <User Schedule>  ferrous    sulfate 325 milliGRAM(s) Oral two times a day  glucagon  Injectable 1 milliGRAM(s) IntraMuscular once  hydrALAZINE 100 milliGRAM(s) Oral three times a day  insulin glargine Injectable (LANTUS) 30 Unit(s) SubCutaneous at bedtime  insulin lispro (ADMELOG) corrective regimen sliding scale   SubCutaneous three times a day before meals  insulin lispro Injectable (ADMELOG) 7 Unit(s) SubCutaneous three times a day before meals  levothyroxine 88 MICROGram(s) Oral <User Schedule>  lisinopril 20 milliGRAM(s) Oral daily  mineral oil enema 133 milliLiter(s) Rectal once  pantoprazole    Tablet 40 milliGRAM(s) Oral two times a day  polyethylene glycol 3350 17 Gram(s) Oral daily  predniSONE   Tablet 20 milliGRAM(s) Oral daily  pyridoxine 50 milliGRAM(s) Oral daily  rosuvastatin 10 milliGRAM(s) Oral at bedtime  senna 2 Tablet(s) Oral at bedtime  tacrolimus 1 milliGRAM(s) Oral at bedtime  tacrolimus 2 milliGRAM(s) Oral daily    MEDICATIONS  (PRN):  aluminum hydroxide/magnesium hydroxide/simethicone Suspension 30 milliLiter(s) Oral every 4 hours PRN Dyspepsia  dextrose Oral Gel 15 Gram(s) Oral once PRN Blood Glucose LESS THAN 70 milliGRAM(s)/deciliter  hydrALAZINE Injectable 10 milliGRAM(s) IV Push every 8 hours PRN SBP >180 and HR 60-70bpm  melatonin 3 milliGRAM(s) Oral at bedtime PRN Insomnia  metoprolol tartrate Injectable 5 milliGRAM(s) IV Push every 6 hours PRN SBP >170  ondansetron Injectable 4 milliGRAM(s) IV Push every 8 hours PRN Nausea and/or Vomiting    Allergies  No Known Allergies    Vital Signs Last 24 Hrs  T(C): 36.9 (18 Dec 2024 04:55), Max: 36.9 (18 Dec 2024 04:55)  T(F): 98.4 (18 Dec 2024 04:55), Max: 98.4 (18 Dec 2024 04:55)  HR: 68 (18 Dec 2024 04:55) (62 - 68)  BP: 160/54 (18 Dec 2024 04:55) (108/64 - 160/54)  BP(mean): --  RR: 18 (18 Dec 2024 04:55) (18 - 18)  SpO2: 97% (18 Dec 2024 04:55) (96% - 98%)    Parameters below as of 18 Dec 2024 04:55  Patient On (Oxygen Delivery Method): room air      I&O's Summary        TELE: Not on telemetry   PHYSICAL EXAM:  Constitutional: NAD, awake and alert  HEENT: Moist Mucous Membranes, Anicteric  Pulmonary: Non-labored, breath sounds are clear bilaterally, No wheezing, rales or rhonchi  Cardiovascular: Regular, S1 and S2, No murmurs, No rubs, gallops or clicks  Gastrointestinal:  soft, nontender, nondistended   Lymph: No peripheral edema. No lymphadenopathy.   Skin: No visible rashes or ulcers.  Psych:  Mood & affect appropriate      LABS: All Labs Reviewed:                        8.2    5.00  )-----------( 319      ( 17 Dec 2024 07:59 )             25.2                         8.5    4.52  )-----------( 336      ( 16 Dec 2024 09:15 )             26.3     17 Dec 2024 07:59    136    |  104    |  31     ----------------------------<  74     4.2     |  28     |  0.94   16 Dec 2024 09:15    135    |  103    |  34     ----------------------------<  117    4.7     |  25     |  0.87     Ca    11.4       17 Dec 2024 07:59  Ca    11.3       16 Dec 2024 09:15    TPro  6.9    /  Alb  2.1    /  TBili  0.3    /  DBili  x      /  AST  30     /  ALT  30     /  AlkPhos  111    16 Dec 2024 09:15   LIVER FUNCTIONS - ( 16 Dec 2024 09:15 )  Alb: 2.1 g/dL / Pro: 6.9 g/dL / ALK PHOS: 111 U/L / ALT: 30 U/L / AST: 30 U/L / GGT: x             12 Lead ECG:   Ventricular Rate 81 BPM    Atrial Rate 81 BPM    P-R Interval 148 ms    QRS Duration 104 ms    Q-T Interval 406 ms    QTC Calculation(Bazett) 471 ms    P Axis 44 degrees    R Axis 12 degrees    T Axis 53 degrees    Diagnosis Line Normal sinus rhythm  Normal ECG  When compared with ECG of 29-NOV-2024 07:11,  Nonspecific T wave abnormality, improved in Lateral leads  Confirmed by Kya Gonzalez ([id number]) on 12/1/2024 10:43:18 AM (12-01-24 @ 09:28)      TRANSTHORACIC ECHOCARDIOGRAM REPORT  ________________________________________________________________________________                                      _______       Pt. Name:       JAN CALVIN Study Date:    11/29/2024  MRN:            [id number]          YOB: 1955  Accession #:    [id number]         Age:           69 years  Account#:       [id number]        Gender:        M  Heart Rate:                       Height:        64.17 in (163.00 cm)  Rhythm:       Weight:        169.75 lb (77.00 kg)  Blood Pressure: 196/81 mmHg       BSA/BMI:       1.83 m² / 28.98 kg/m²  ________________________________________________________________________________________  Referring Physician:    [id number] Ale Ibrahim  Interpreting Physician: Kya Gonzalez MD  Primary Sonographer:    Butch Salazar    CPT:               ECHO TTE WO CON COMP W DOPP - 47038.m  Indication(s):     Cardiac murmur, unspecified - R01.1  Procedure:         Transthoracic echocardiogram with 2-D, M-mode and complete                     spectral and color flow Doppler.  Ordering Location: [facility]  Admission Status:  ED    _______________________________________________________________________________________     CONCLUSIONS:      1. Left ventricular cavity is normal in size. Left ventricular systolic function is normal with an ejection fraction of 60 % by Moreno's method of disks.   2. Trace pericardial effusion noted adjacent to the posterior left ventricle.   3. Normal right ventricular cavity size and normal right ventricular systolic function.   4. Aortic valve anatomy cannot be determined with normal systolic excursion. There is calcification of the aortic valve leaflets.   5. Structurally normal mitral valve with normalleaflet excursion.   6. Structurally normal tricuspid valve with normal leaflet excursion. Trace tricuspid regurgitation.   7. The inferior vena cava is normal in size measuring 1.36 cm in diameter, (normal <2.1cm) with normal inspiratory collapse (normal >50%) consistent with normal right atrial pressure (~3, range 0-5mmHg).    ________________________________________________________________________________________  FINDINGS:     Left Ventricle:  The left ventricular cavity is normal in size. Left ventricular systolic function is normal with a calculated ejection fraction of 60 % by the Moreno's biplane method of disks.     Right Ventricle:  The right ventricular cavity is normal in size and right ventricular systolic function is normal. Tricuspid annular plane systolic excursion (TAPSE) is 2.9 cm (normal >=1.7 cm).     Left Atrium:  The left atrium is normal in size with an indexed volume of 33.15 ml/m².     Right Atrium:  The right atrium is normal in size with an indexed volume of 21.17 ml/m².     Interatrial Septum:  The interatrial septum appears intact.     Aortic Valve:  The aortic valve anatomy cannot be determined with normal systolic excursion. There is calcification of the aortic valve leaflets. The peak transaortic velocity is 2.15 m/s, peak transaortic gradient is 18.5 mmHg and mean transaortic gradient is 11.0 mmHg with an LVOT/aortic valve VTI ratio of 0.64. The aortic valve area is estimated at 2.67 cm² by the continuity equation. There is no evidence of aortic regurgitation.     Mitral Valve:  Structurally normal mitral valve with normal leaflet excursion. There is calcification of the mitral valve annulus.     Tricuspid Valve:  Structurally normal tricuspid valve with normal leaflet excursion. There is trace tricuspid regurgitation. Estimated pulmonary artery systolic pressure is 8 mmHg.     Aorta:  The aortic root at the sinuses of Valsalva is normal in size, measuring 3.50 cm (indexed 1.91 cm/m²). The ascending aorta is dilated, measuring 4.10 cm (indexed 2.24 cm/m²).     Pericardium:  There is a trace pericardial effusion noted adjacent to the posterior left ventricle.     Systemic Veins:  The inferior vena cava is normal in size measuring 1.36 cm in diameter, (normal <2.1cm) with normal inspiratory collapse (normal >50%) consistent with normal right atrial pressure (~3, range 0-5mmHg).  ____________________________________________________________________  QUANTITATIVE DATA:  Left Ventricle Measurements: (Indexed to BSA)     IVSd (2D):   1.0 cm  LVPWd (2D):  1.0 cm  LVIDd (2D):  5.3 cm  LVIDs (2D):  3.6 cm  LV Mass:     203 g  111.2 g/m²  LV Vol d, MOD A2C: 97.8 ml  53.50 ml/m²  LV Vol d, MOD A4C: 121.0 ml 66.19 ml/m²  LV Vol d, MOD BP:  109.5 ml 59.90 ml/m²  LV Vol s, MOD A2C: 36.4 ml  19.91 ml/m²  LV Vol s, MOD A4C: 49.5 ml  27.08 ml/m²  LV Vol s, MOD BP:  44.0 ml  24.04 ml/m²  LVOT SV MOD BP:    65.5 ml  LV EF% MOD BP:     60 %     MV E Vmax:    1.02 m/s  MV A Vmax:    0.93 m/s  MV E/A:       1.10  e' lateral:   13.10 cm/s  e' medial:    9.25 cm/s  E/e' lateral: 7.79  E/e' medial:  11.03  E/e' Average: 9.13  MV DT:        151 msec    Aorta Measurements: (Normal range) (Indexed to BSA)     Ao Root d     3.50 cm (3.1 - 3.7 cm) 1.91 cm/m²  Ao Asc d, 2D: 4.10  Ao Asc prox:  4.10 cm               2.24 cm/m²            Left Atrium Measurements: (Indexed to BSA)  LA Diam 2D: 4.40 cm         Right Ventricle Measurements: Right Atrial Measurements:     TAPSE:            2.9 cm      RA Vol:            38.70 ml  RV Base (RVID1):  3.7cm      RA Vol s, MOD A4C  38.7 ml  RV Mid (RVID2):   3.1 cm      RA Vol Index:      21.17 ml/m²  RV Major (RVID3): 8.0 cm      RA Area s, MOD A4C 14.7 cm²       LVOT / RVOT/ Qp/Qs Data: (Indexed to BSA)  LVOT Diameter:  2.30 cm  LVOT Area:      4.15cm²  LVOT Vmax:      1.34 m/s  LVOT Vmn:       0.949 m/s  LVOT VTI:       26.30 cm  LVOT peak grad: 7 mmHg  LVOT mean grad: 4.0 mmHg  LVOT SV:        109.3 ml  59.78 ml/m²    Aortic Valve Measurements:  AV Vmax:                2.2 m/s  AV Peak Gradient:       18.5 mmHg  AV Mean Gradient:       11.0 mmHg  AV VTI:                 41.0 cm  AV VTI Ratio:           0.64  AoV EOA, Contin:        2.67 cm²  AoV EOA, Contin i:      1.46 cm²/m²  AoV Dimensionless Index 0.64    Mitral Valve Measurements:     MV E Vmax: 1.0 m/s  MV A Vmax: 0.9 m/s  MV E/A:    1.1       Tricuspid Valve Measurements:     TR Vmax:          1.2 m/s  TR Peak Gradient: 5.3 mmHg  RA Pressure:      3 mmHg  PASP:             8 mmHg    ________________________________________________________________________________________  Electronically signed on 11/30/2024 at 1:57:15 PM by Kya Gonzalez MD         *** Final ***

## 2024-12-18 NOTE — PROGRESS NOTE ADULT - PROVIDER SPECIALTY LIST ADULT
Cardiology
Endocrinology
Gastroenterology
Gastroenterology
Heme/Onc
Hospitalist
Infectious Disease
Nephrology
Cardiology
Gastroenterology
Heme/Onc
Hospitalist
Infectious Disease
Nephrology
Cardiology
Gastroenterology
Heme/Onc
Hospitalist
Hospitalist
Infectious Disease
Nephrology
Cardiology
Endocrinology
Gastroenterology
Heme/Onc
Hospitalist
Infectious Disease
Nephrology
Heme/Onc
Hospitalist
Hospitalist
Endocrinology
Heme/Onc
Heme/Onc
Hospitalist

## 2024-12-18 NOTE — PROGRESS NOTE ADULT - SUBJECTIVE AND OBJECTIVE BOX
[facility]                                                       Dr. Bruce, Dr. Prabhakar, Dr. Cabrales, Dr. Zaragoza, Dr. Bingham, Dr. Loya                                      [facility], [facility], [address]                                                 [address] [address]                                      Ph: [phone]  Fax: [phone]                                         Ph: [phone]  Fax: [phone]      Patient is a 69y old  Male who presents with a chief complaint of AMS (01 Dec 2024 11:19)  Patient seen in follow up for CKD, h/o Kidney transplant.        PAST MEDICAL HISTORY:  Diabetes Mellitus Type II    ESRD on Dialysis    GERD (gastroesophageal reflux disease)    Depression    Hypothyroidism    Hyperlipidemia    Kidney transplanted    Hypertension    ANGELIKA (obstructive sleep apnea)    Peripheral neuropathy    Shoulder fracture      MEDICATIONS  (STANDING):  amLODIPine   Tablet 10 milliGRAM(s) Oral daily  ascorbic acid 500 milliGRAM(s) Oral two times a day  azaTHIOprine 50 milliGRAM(s) Oral two times a day  buPROPion XL (24-Hour) . 300 milliGRAM(s) Oral daily  carvedilol 12.5 milliGRAM(s) Oral every 12 hours  cloNIDine 0.1 milliGRAM(s) Oral three times a day  dextrose 5%. 1000 milliLiter(s) (100 mL/Hr) IV Continuous <Continuous>  dextrose 5%. 1000 milliLiter(s) (50 mL/Hr) IV Continuous <Continuous>  dextrose 50% Injectable 25 Gram(s) IV Push once  dextrose 50% Injectable 12.5 Gram(s) IV Push once  dextrose 50% Injectable 25 Gram(s) IV Push once  escitalopram 10 milliGRAM(s) Oral <User Schedule>  ferrous    sulfate 325 milliGRAM(s) Oral two times a day  glucagon  Injectable 1 milliGRAM(s) IntraMuscular once  hydrALAZINE 100 milliGRAM(s) Oral three times a day  insulin glargine Injectable (LANTUS) 30 Unit(s) SubCutaneous at bedtime  insulin lispro (ADMELOG) corrective regimen sliding scale   SubCutaneous three times a day before meals  insulin lispro Injectable (ADMELOG) 10 Unit(s) SubCutaneous three times a day before meals  levothyroxine 88 MICROGram(s) Oral <User Schedule>  lisinopril 20 milliGRAM(s) Oral daily  mineral oil enema 133 milliLiter(s) Rectal once  pantoprazole    Tablet 40 milliGRAM(s) Oral two times a day  polyethylene glycol 3350 17 Gram(s) Oral daily  predniSONE   Tablet 20 milliGRAM(s) Oral daily  pyridoxine 50 milliGRAM(s) Oral daily  rosuvastatin 10 milliGRAM(s) Oral at bedtime  senna 2 Tablet(s) Oral at bedtime  tacrolimus 1 milliGRAM(s) Oral at bedtime  tacrolimus 2 milliGRAM(s) Oral daily    MEDICATIONS  (PRN):  aluminum hydroxide/magnesium hydroxide/simethicone Suspension 30 milliLiter(s) Oral every 4 hours PRN Dyspepsia  dextrose Oral Gel 15 Gram(s) Oral once PRN Blood Glucose LESS THAN 70 milliGRAM(s)/deciliter  hydrALAZINE Injectable 10 milliGRAM(s) IV Push every 8 hours PRN SBP >180 and HR 60-70bpm  melatonin 3 milliGRAM(s) Oral at bedtime PRN Insomnia  metoprolol tartrate Injectable 5 milliGRAM(s) IV Push every 6 hours PRN SBP >170  ondansetron Injectable 4 milliGRAM(s) IV Push every 8 hours PRN Nausea and/or Vomiting    T(C): 36.9 (12-18-24 @ 04:55), Max: 36.9 (12-16-24 @ 20:49)  HR: 68 (12-18-24 @ 04:55) (57 - 68)  BP: 160/54 (12-18-24 @ 04:55) (95/53 - 164/61)  RR: 18 (12-18-24 @ 04:55)  SpO2: 97% (12-18-24 @ 04:55)  Wt(kg): --  I&O's Detail                PHYSICAL EXAM:  General: No distress  Respiratory: b/l air entry  Cardiovascular: S1 S2  Gastrointestinal: soft  Extremities:  no edema          LABORATORY:                        8.5    4.24  )-----------( 310      ( 18 Dec 2024 09:22 )             26.4     12-18    134[L]  |  102  |  36[H]  ----------------------------<  259[H]  3.8   |  25  |  1.10    Ca    11.3[H]      18 Dec 2024 09:22    TPro  6.8  /  Alb  2.3[L]  /  TBili  0.3  /  DBili  x   /  AST  29  /  ALT  29  /  AlkPhos  115  12-18    Sodium: 134 mmol/L (12-18 @ 09:22)  Sodium: 136 mmol/L (12-17 @ 07:59)    Potassium: 3.8 mmol/L (12-18 @ 09:22)  Potassium: 4.2 mmol/L (12-17 @ 07:59)    Hemoglobin: 8.5 g/dL (12-18 @ 09:22)  Hemoglobin: 8.2 g/dL (12-17 @ 07:59)  Hemoglobin: 8.5 g/dL (12-16 @ 09:15)    Creatinine, Serum 1.10 (12-18 @ 09:22)  Creatinine, Serum 0.94 (12-17 @ 07:59)  Creatinine, Serum 0.87 (12-16 @ 09:15)        LIVER FUNCTIONS - ( 18 Dec 2024 09:22 )  Alb: 2.3 g/dL / Pro: 6.8 g/dL / ALK PHOS: 115 U/L / ALT: 29 U/L / AST: 29 U/L / GGT: x           Urinalysis Basic - ( 18 Dec 2024 09:22 )    Color: x / Appearance: x / SG: x / pH: x  Gluc: 259 mg/dL / Ketone: x  / Bili: x / Urobili: x   Blood: x / Protein: x / Nitrite: x   Leuk Esterase: x / RBC: x / WBC x   Sq Epi: x / Non Sq Epi: x / Bacteria: x

## 2024-12-18 NOTE — PROVIDER CONTACT NOTE (OTHER) - ASSESSMENT
Alert and responsive. Rectal temp of 102.7 and Blood pressure of 188/68
Patient alert. A&0X2, confused to time and situation. Patient complaining of feeling constipated and wanting to pass stool but unable to. Patient denies pain, N+V.

## 2024-12-18 NOTE — PROGRESS NOTE ADULT - ASSESSMENT
Patient is a 70yo M, PMH DM, HTN, HLD, h/o kidney transplant, hypothyroidism, presents to ED from [facility] for AMS. Patient is lethargic and unable to provide history at this time.    ESBL Bacteremia 2/2 Acute Cystitis  AMS 2/2 above  Febrile in the .5  UA positive for UTI  Flu/COVID/RSV negative  11/29 BCx ESBL E.coli , repeat negative  11/29 UCx   50,000 - 99,000 CFU/mL Escherichia coli  S/p ertapenem x10 day course    Liver Mass w/ mets  ?Sacral OM  imaging studies noted in re: sacral findings--as there is no sacral wound present, suspect findings on sacral more related to mets from possible malignancy  Per Ortho no surgical intervention for fracture of sacrum    S/p IR guided liver bx    Recommendations:  F/u liver path  Monitor off ABx  Trend temps/WBC  Monitor Hgb, transfusion prn protocol  Additional care per primary team    Please call with any further questions.  Vanessa Saul M.D.  [facility] Division of Infectious Diseases [phone]  For after 5 P.M. and weekends, please call [phone]  Available on [employer] TEAMS

## 2024-12-18 NOTE — PROGRESS NOTE ADULT - PROBLEM SELECTOR PROBLEM 1
Gram negative sepsis
Gram negative sepsis
Uncontrolled type 2 diabetes mellitus with hyperglycemia
Gram negative sepsis
Gram negative sepsis
Uncontrolled type 2 diabetes mellitus with hyperglycemia
Gram negative sepsis
Uncontrolled type 2 diabetes mellitus with hyperglycemia
Gram negative sepsis

## 2024-12-18 NOTE — PROVIDER CONTACT NOTE (OTHER) - RECOMMENDATIONS
Notify MD
Give standing order of Hydralazine and monitor.
Give Ofirmiv as per ordered and monitor vital signs.

## 2024-12-18 NOTE — PROGRESS NOTE ADULT - ASSESSMENT
70yo M, PMH DM, HTN, HLD, h/o kidney transplant, hypothyroidism, presents to ED from [facility] for AMS, found to be febrile with positive UA. 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 Cardiology called for pericardial effusion.     Uncontrolled HTN/Pericardial Effusion  - CT chest: Small left pleural effusion with small loculated components and small to moderate pericardial effusion  - TTE: EF 60%, trace pericardial effusion adjacent to the posterior LV.  No tamponade physiology.  No significant valvular disease  - No evidence of any meaningful volume overload.  Non-orthopneic on RA    - EKG with nonspecific TWI anteriorly, repeat unchanged  - No evidence of any active ischemia   - Continue statin   - Continue to hold ASA in setting of persistent anemia, though, neg FOBT.  s/p PRBC's for Hgb of 6.5 (12/10).  There appears to be no clear indication for it  - Continue to trend and transfuse per primary   - Heme and GI following for anemia.    - with liver mass on MRI, s/p liver Bx via IR (12/16) . Tolerated well from CV POV     - BP labile 100-160s   - Continue Norvasc 10mg and Lisinopril 20 mg daily  - Continue Clonidine 0.1 mg PO TID   - Continue Coreg 12.5mg BID   - Continue Hydralazine 100 mg q8H     - Ortho following for pathological sacral Fx with possible OM.   - No acute orthopedic surgical intervention at this time.    - Monitor and replete lytes, keep K>4, Mg>2.  - Will continue to follow.    Robin Cordoba, MS FNP, AGACNP  Nurse Practitioner- Cardiology   Please call on TEAMS     68yo M, PMH DM, HTN, HLD, h/o kidney transplant, hypothyroidism, presents to ED from [facility] for AMS, found to be febrile with positive UA. 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 Cardiology called for pericardial effusion.     Uncontrolled HTN/Pericardial Effusion  - CT chest: Small left pleural effusion with small loculated components and small to moderate pericardial effusion  - TTE: EF 60%, trace pericardial effusion adjacent to the posterior LV.  No tamponade physiology.  No significant valvular disease  - No evidence of any meaningful volume overload.  Non-orthopneic on RA    - EKG with nonspecific TWI anteriorly, repeat unchanged  - No evidence of any active ischemia   - Continue statin   - Continue to hold ASA in setting of persistent anemia, though, neg FOBT.  s/p PRBC's for Hgb of 6.5 (12/10).  There appears to be no clear indication for it  - Continue to trend and transfuse per primary   - Heme and GI following for anemia.    - with liver mass on MRI, s/p liver Bx via IR (12/16) . Tolerated well from CV POV     - BP labile 100-160s   - Continue Norvasc 10mg and Lisinopril 20 mg daily  - Continue Clonidine 0.1 mg PO TID   - Continue Coreg 12.5mg BID   - Continue Hydralazine 100 mg q8H     - Ortho following for pathological sacral Fx with possible OM.   - No acute orthopedic surgical intervention at this time.    - D/c planning per primary team.   - Monitor and replete lytes, keep K>4, Mg>2.  - Will continue to follow.    Robin Cordoba, MS FNP, AGACNP  Nurse Practitioner- Cardiology   Please call on TEAMS

## 2024-12-18 NOTE — PROGRESS NOTE ADULT - SUBJECTIVE AND OBJECTIVE BOX
[facility], Division of Infectious Diseases  WANG Mccoy Y. Patel, S. Shah, G. [facility]  [phone]    Name: JAN CALVIN  Age: 69y  Gender: Male  MRN: [id number]    Interval History:  No acute overnight events.   Notes reviewed    Antibiotics:      Medications:  aluminum hydroxide/magnesium hydroxide/simethicone Suspension 30 milliLiter(s) Oral every 4 hours PRN  amLODIPine   Tablet 10 milliGRAM(s) Oral daily  ascorbic acid 500 milliGRAM(s) Oral two times a day  azaTHIOprine 50 milliGRAM(s) Oral two times a day  buPROPion XL (24-Hour) . 300 milliGRAM(s) Oral daily  carvedilol 12.5 milliGRAM(s) Oral every 12 hours  cloNIDine 0.1 milliGRAM(s) Oral three times a day  dextrose 5%. 1000 milliLiter(s) IV Continuous <Continuous>  dextrose 5%. 1000 milliLiter(s) IV Continuous <Continuous>  dextrose 50% Injectable 25 Gram(s) IV Push once  dextrose 50% Injectable 12.5 Gram(s) IV Push once  dextrose 50% Injectable 25 Gram(s) IV Push once  dextrose Oral Gel 15 Gram(s) Oral once PRN  escitalopram 10 milliGRAM(s) Oral <User Schedule>  ferrous    sulfate 325 milliGRAM(s) Oral two times a day  glucagon  Injectable 1 milliGRAM(s) IntraMuscular once  hydrALAZINE 100 milliGRAM(s) Oral three times a day  hydrALAZINE Injectable 10 milliGRAM(s) IV Push every 8 hours PRN  insulin glargine Injectable (LANTUS) 30 Unit(s) SubCutaneous at bedtime  insulin lispro (ADMELOG) corrective regimen sliding scale   SubCutaneous three times a day before meals  insulin lispro Injectable (ADMELOG) 10 Unit(s) SubCutaneous three times a day before meals  levothyroxine 88 MICROGram(s) Oral <User Schedule>  lisinopril 20 milliGRAM(s) Oral daily  melatonin 3 milliGRAM(s) Oral at bedtime PRN  metoprolol tartrate Injectable 5 milliGRAM(s) IV Push every 6 hours PRN  mineral oil enema 133 milliLiter(s) Rectal once  ondansetron Injectable 4 milliGRAM(s) IV Push every 8 hours PRN  pantoprazole    Tablet 40 milliGRAM(s) Oral two times a day  polyethylene glycol 3350 17 Gram(s) Oral daily  predniSONE   Tablet 20 milliGRAM(s) Oral daily  pyridoxine 50 milliGRAM(s) Oral daily  rosuvastatin 10 milliGRAM(s) Oral at bedtime  senna 2 Tablet(s) Oral at bedtime  tacrolimus 1 milliGRAM(s) Oral at bedtime  tacrolimus 2 milliGRAM(s) Oral daily      Review of Systems:  unable to obtain    Allergies: No Known Allergies    For details regarding the patient's past medical history, social history, family history, and other miscellaneous elements, please refer the initial infectious diseases consultation and/or the admitting history and physical examination for this admission.    Objective:  Vitals:   T(C): 37 (12-18-24 @ 13:39), Max: 37 (12-18-24 @ 13:39)  HR: 65 (12-18-24 @ 13:39) (63 - 68)  BP: 119/58 (12-18-24 @ 13:39) (108/64 - 160/54)  RR: 18 (12-18-24 @ 13:39) (18 - 18)  SpO2: 98% (12-18-24 @ 13:39) (97% - 100%)    Physical Examination:  General: no acute distress  HEENT: NC/AT, EOMI,   Cardio: RRR  Resp: decreased breath sounds  Abd: soft, NT, ND  Ext: no edema or cyanosis  Skin: warm, dry, no visible rash      Laboratory Studies:  CBC:                       8.5    4.24  )-----------( 310      ( 18 Dec 2024 09:22 )             26.4     CMP: 12-18    134[L]  |  102  |  36[H]  ----------------------------<  259[H]  3.8   |  25  |  1.10    Ca    11.3[H]      18 Dec 2024 09:22    TPro  6.8  /  Alb  2.3[L]  /  TBili  0.3  /  DBili  x   /  AST  29  /  ALT  29  /  AlkPhos  115  12-18    LIVER FUNCTIONS - ( 18 Dec 2024 09:22 )  Alb: 2.3 g/dL / Pro: 6.8 g/dL / ALK PHOS: 115 U/L / ALT: 29 U/L / AST: 29 U/L / GGT: x           Urinalysis Basic - ( 18 Dec 2024 09:22 )    Color: x / Appearance: x / SG: x / pH: x  Gluc: 259 mg/dL / Ketone: x  / Bili: x / Urobili: x   Blood: x / Protein: x / Nitrite: x   Leuk Esterase: x / RBC: x / WBC x   Sq Epi: x / Non Sq Epi: x / Bacteria: x        Microbiology: reviewed        Radiology: reviewed

## 2024-12-18 NOTE — PROGRESS NOTE ADULT - NS ATTEND AMEND GEN_ALL_CORE FT
70yo M, PMH DM, HTN, HLD, h/o kidney transplant, hypothyroidism, presents to ED from [facility] for AMS, found to be febrile with positive UA. 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 Cardiology called for pericardial effusion.       - ID and surgery following   - Continue antibiotics   - TTE 11/29 showed normal lv systolic function, ef 60%, normal rv size and function. trace tr. trace pericardial effusion adjacent to posterior lv.   - Pt w/no signs of tamponade on examination    - Avoid anticoagulants   - IV fluids as needed. Avoid reducing preload   - Continue aspirin and statin   - Continue Norvasc 10, Coreg 12.5, Hydralazine, Lisinopril 20  - Up titrate Lisinopril for BP control  - Trend CBC and transfuse per primary
70yo M, PMH DM, HTN, HLD, h/o kidney transplant, hypothyroidism, presents to ED from [facility] for AMS, found to be febrile with positive UA. 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 Cardiology called for pericardial effusion.      - CT chest: Small left pleural effusion with small loculated components and small to moderate pericardial effusion  - TTE: EF 60%, trace pericardial effusion adjacent to the posterior LV.  No tamponade physiology.  No significant valvular disease   - No evidence of any active ischemia or vol ol   - hgh downtrending. would consider PM h+h to monitor levels with biopsy planned for tm 12/15  - Continue to trend and transfuse per primary    - Heme and GI following for anemia.  With liver mass on MRI, planned for liver Bx via IR on Monday, 12/16.    - No contraindication from cardiac standpoint given electrolytes WNL.  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
I have personally seen and examined the patient in detail.  I have spoken to the provider regarding the assessment and plan of care.  I have made changes to the note accordingly.    70yo M, PMH DM, HTN, HLD, h/o kidney transplant, hypothyroidism, presents to ED from [facility] for AMS, found to be febrile with positive UA. 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 Cardiology called for pericardial effusion.     - Ct revealed Small left pleural effusion with small loculated components, Small to moderate pericardial effusion. Approximately 5.5 cm low-density lesion anterior right hepatic lobe. Atrophic right kidney. Absent left kidney. 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 Colonic fecal retention. Stercoral proctitis. Patchy sclerosis and osteolysis throughout the bilateral sacrum with pathologic fractures. There is soft tissue with stranding extending throughout the presacral and posterior extraperitoneal pelvic soft tissues. There are a few bubbles of air/gas at the right sacral pathologic fracture, findings suggestive of infection/osteomyelitis.   - Sepsis w/u per medicine.   - Orthopedic seen, f/u MRI LSp/Pelvis  - Continue antibiotics     - Please obtain transthoracic echocardiogram to assess the pericardial effusion and to evaluate signs of tamponade  - Pt w/no signs of tamponade on examination    - EKG with nonspecific TWI  - Avoid anticoagulants   - IV fluids as needed. Avoid reducing preload   - No evidence of any meaningful volume overload     - EKG with nonspecific TWI anteriorly. would repeat ekg   - No evidence of any active ischemia   - Continue aspirin and statin     - -190s   - Continue Norvasc 10, Hydralazine 100 tid, [id number] BID and, lisinopril 20  - Continue Hydralazine and Metoprolol IV
68yo M, PMH DM, HTN, HLD, h/o kidney transplant, hypothyroidism, presents to ED from [facility] for AMS, found to be febrile with positive UA. 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 Cardiology called for pericardial effusion.     - pt not responding to my questioning. Unable to provide meaningful information.     - CT chest: Small left pleural effusion with small loculated components, Small to moderate pericardial effusion  - TTE showed EF 60%, trace pericardial effusion adjacent ot the posterior LV.  No significant valvular disease  - Pt w/no signs of tamponade on examination    - No evidence of any meaningful volume overload   - IV fluids as needed.      - EKG with nonspecific TWI anteriorly, repeat unchanged  - No evidence of any active ischemia   - Continue statin   - In the setting of persistent anemia (neg FOBT), with Hgb 6-7, would D/C ASA if no clear indication    - BP uncontrolled   - Continue Norvasc 10, Coreg 12.5, Hydralazine 10 mg q8H, Lisinopril 40 daily   - inc hydral to 25mg q8    - Heme and GI following for anemia  - Ortho following for pathological sacral Fx with possible OM.  No acute orthopedic surgical intervention at this time.  - Abx per ID
I have personally seen and examined the patient in detail.  I have spoken to the provider regarding the assessment and plan of care.  I have made changes to the note accordingly.    68yo M, PMH DM, HTN, HLD, h/o kidney transplant, hypothyroidism, presents to ED from [facility] for AMS, found to be febrile with positive UA. 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 Cardiology called for pericardial effusion.     - Ct revealed Small left pleural effusion with small loculated components, Small to moderate pericardial effusion. Approximately 5.5 cm low-density lesion anterior right hepatic lobe. Atrophic right kidney. Absent left kidney. 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 Colonic fecal retention. Stercoral proctitis. Patchy sclerosis and osteolysis throughout the bilateral sacrum with pathologic fractures. There is soft tissue with stranding extending throughout the presacral and posterior extraperitoneal pelvic soft tissues. There are a few bubbles of air/gas at the right sacral pathologic fracture, findings suggestive of infection/osteomyelitis.   - Sepsis w/u per medicine.   - Orthopedic seen, f/u MRI LSp/Pelvis  - Continue antibiotics     - TTE shows normal LV function with trace pericardial effusion  - Pt w/no signs of tamponade on examination    - EKG with nonspecific TWI  - Avoid anticoagulants   - IV fluids as needed. Avoid reducing preload   - No evidence of any meaningful volume overload     - EKG with nonspecific TWI anteriorly. would repeat ekg   - No evidence of any active ischemia   - Continue aspirin and statin     - -190s   - Continue Norvasc 10, Hydralazine 100 tid, [id number] BID and, lisinopril 20  - Continue Hydralazine and Metoprolol IV.
68yo M, PMH DM, HTN, HLD, h/o kidney transplant, hypothyroidism, presents to ED from [facility] for AMS, found to be febrile with positive UA. 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 Cardiology called for pericardial effusion.     - CT chest: Small left pleural effusion with small loculated components, Small to moderate pericardial effusion  - TTE: EF 60%, trace pericardial effusion adjacent ot the posterior LV.  No significant valvular disease  - no signs of tamponade on examination    - No evidence of any meaningful volume overload   - Okay with IV fluids. He appears to be dry on exam. Encourage fluid/po intake     - EKG with nonspecific TWI anteriorly, repeat unchanged  - No evidence of any active ischemia   - Continue statin   - now off ASA in setting of persistent anemia (neg FOBT), with Hgb 6-7    - BP labile and uncontrolled 120-180's systolics   - continue antihypertensive agents on max Norvasc, Hydralazine and Lisinopril dose   - continue Clonidine increase to 0.1 mg PO TID   - continue coreg (would uptitate next) if bp remains uncontrolled   - Monitor and replete Lytes. Keep K > 4 and Mg > 2    - Heme and GI following for anemia  - Ortho following for pathological sacral Fx with possible OM.   - No acute orthopedic surgical intervention at this time.
68yo M, PMH DM, HTN, HLD, h/o kidney transplant, hypothyroidism, presents to ED from [facility] for AMS, found to be febrile with positive UA. 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 Cardiology called for pericardial effusion.     - CT chest: Small left pleural effusion with small loculated components and small to moderate pericardial effusion  - TTE: EF 60%, trace pericardial effusion adjacent ot the posterior LV.  No significant valvular disease  - No tamponade physiology  - No evidence of any meaningful volume overload   - Remains clinically dry on exam.  Encourage fluid/po intake, or IVF    - EKG with nonspecific TWI anteriorly, repeat unchanged  - No evidence of any active ischemia   - Continue statin   - Continue to hold ASA in setting of persistent anemia, though, neg FOBT.  s/p PRBC's for Hgb of 6.5 (12/10)  - Continue to trend and transfuse per primary   - Heme and GI following for anemia.  With liver mass on MRI, planned for liver Bx via IR    - BP labile bu acceptable   - Continue Norvasc 10mg and Lisinopril 40 mg daily  - Continue Clonidine 0.1 mg PO TID   - Continue coreg 12.5mg BID   - Continue Hydralazine but would reduce to 50 mg q8H if SBP stable   - Continue to monitor routine hemodynamics     - Ortho following for pathological sacral Fx with possible OM.   - No acute orthopedic surgical intervention at this time.
68yo M, PMH DM, HTN, HLD, h/o kidney transplant, hypothyroidism, presents to ED from [facility] for AMS, found to be febrile with positive UA. 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 Cardiology called for pericardial effusion.      - CT chest: Small left pleural effusion with small loculated components and small to moderate pericardial effusion  - TTE: EF 60%, trace pericardial effusion adjacent to the posterior LV.  No tamponade physiology.  No significant valvular disease  - No evidence of any meaningful volume overload    - No evidence of any active ischemia   - Continue statin   - Continue to hold ASA in setting of persistent anemia, though, neg FOBT.  s/p PRBC's for Hgb of 6.5 (12/10).  There appears to be no clear indication for it   - Heme and GI following for anemia.  With liver mass on MRI, planned for liver Bx via IR   - BP's now significantly better at systolic 140's  - Continue Norvasc 10mg and Lisinopril 40 mg daily  - Continue Clonidine 0.1 mg PO TID and Coreg 12.5mg BID   - Continue Hydralazine but would reduce to 50 mg q8H if SBP stable   - Continue to monitor routine hemodynamics   - Ortho following for pathological sacral Fx with possible OM.   - No acute orthopedic surgical intervention at this time.
70yo M, PMH DM, HTN, HLD, h/o kidney transplant, hypothyroidism, presents to ED from [facility] for AMS, found to be febrile with positive UA. 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 Cardiology called for pericardial effusion.     Uncontrolled HTN/Pericardial Effusion  - CT chest: Small left pleural effusion with small loculated components, Small to moderate pericardial effusion  - TTE showed EF 60%, trace pericardial effusion adjacent ot the posterior LV.  No significant valvular disease  - Pt w/no signs of tamponade on examination    - No evidence of any meaningful volume overload   - IV fluids as needed.      - EKG with nonspecific TWI anteriorly, repeat unchanged  - No evidence of any active ischemia   - Continue statin   - In the setting of persistent anemia (neg FOBT), with Hgb 6-7, would D/C ASA if no clear indication    - BP uncontrolled 140-190's, though, may not be as accurate as it is being obtained on LE  - Continue Norvasc 10, Coreg 12.5, Hydralazine 10 mg q8H, Lisinopril 20 daily  - Would up titrate Lisinopril to 40 mg daily or 20 mg q12H    - Heme and GI following for anemia  - Ortho following for pathological sacral Fx with possible OM.  No acute orthopedic surgical intervention at this time.  - Abx per ID
68yo M, PMH DM, HTN, HLD, h/o kidney transplant, hypothyroidism, presents to ED from [facility] for AMS, found to be febrile with positive UA. 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 Cardiology called for pericardial effusion.     - CT chest: Small left pleural effusion with small loculated components, Small to moderate pericardial effusion  - TTE: EF 60%, trace pericardial effusion adjacent ot the posterior LV.  No significant valvular disease  - no signs of tamponade on examination    - No evidence of any meaningful volume overload   - Okay with IV fluids.  He appears to be dry on exam. Encourage fluid/po intake     - EKG with nonspecific TWI anteriorly, repeat unchanged  - No evidence of any active ischemia   - Continue statin   - now off ASA in setting of persistent anemia (neg FOBT), with Hgb 6-7    - continue current  antihypertensive agents on: Norvasc, Coreg, and Hydralazine, Lisinopril and clonidine   - Monitor and replete Lytes. Keep K > 4 and Mg > 2    - Heme and GI following for anemia  - Ortho following for pathological sacral Fx with possible OM.   - No acute orthopedic surgical intervention at this time.  - Abx per ID
68yo M, PMH DM, HTN, HLD, h/o kidney transplant, hypothyroidism, presents to ED from [facility] for AMS, found to be febrile with positive UA. 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 Cardiology called for pericardial effusion.      - CT chest: Small left pleural effusion with small loculated components and small to moderate pericardial effusion  - TTE: EF 60%, trace pericardial effusion adjacent to the posterior LV.  No tamponade physiology.  No significant valvular disease   - No evidence of any active ischemia or vol ol   - Continue to trend hgb and transfuse per primary    - Heme and GI following for anemia.  With liver mass on MRI, s/p biopsy on 12/16  - No contraindication from cardiac standpoint given electrolytes WNL.  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
68yo M, PMH DM, HTN, HLD, h/o kidney transplant, hypothyroidism, presents to ED from [facility] for AMS, found to be febrile with positive UA. 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 Cardiology called for pericardial effusion.      - CT chest: Small left pleural effusion with small loculated components and small to moderate pericardial effusion  - TTE: EF 60%, trace pericardial effusion adjacent to the posterior LV.  No tamponade physiology.  No significant valvular disease   - No evidence of any active ischemia or vol ol   - hgh downtrending. would consider PM h+h to monitor levels with biopsy planned for tm 12/15  - Continue to trend and transfuse per primary    - Heme and GI following for anemia.  With liver mass on MRI, planned for liver Bx via IR on Monday, 12/16.    - No contraindication from cardiac standpoint given electrolytes WNL.  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
70yo M, PMH DM, HTN, HLD, h/o kidney transplant, hypothyroidism, presents to ED from [facility] for AMS, found to be febrile with positive UA. 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 Cardiology called for pericardial effusion.      - CT chest: Small left pleural effusion with small loculated components and small to moderate pericardial effusion  - TTE: EF 60%, trace pericardial effusion adjacent to the posterior LV.  No tamponade physiology.  No significant valvular disease   - No evidence of any active ischemia or vol ol   - Continue to trend hgb and transfuse per primary    - Heme and GI following for anemia.  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  - dc planning per primary team
68yo M, PMH DM, HTN, HLD, h/o kidney transplant, hypothyroidism, presents to ED from [facility] for AMS, found to be febrile with positive UA. 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 Cardiology called for pericardial effusion.     - CT chest: Small left pleural effusion with small loculated components, Small to moderate pericardial effusion  - TTE: EF 60%, trace pericardial effusion adjacent ot the posterior LV.  No significant valvular disease  - No signs of tamponade on examination    - No evidence of any meaningful volume overload   - He appears to be dry on exam. Encourage fluid/po intake, or IVF  - Continue statin   - now off ASA in setting of persistent anemia (neg FOBT), with Hgb 6-7. hgb dropped this am, 6.5  - Heme and GI following for anemia  - Continue max Norvasc 10, Hydralazine 100 TID and Lisinopril 40 dose   - Continue Clonidine increase to 0.1 mg PO TID   - Continue coreg 12.5mg bid ( would up titate next) if bp remains uncontrolled
68yo M, PMH DM, HTN, HLD, h/o kidney transplant, hypothyroidism, presents to ED from [facility] for AMS, found to be febrile with positive UA. 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 Cardiology called for pericardial effusion.       - CT chest: Small left pleural effusion with small loculated components, Small to moderate pericardial effusion  - TTE: EF 60%, trace pericardial effusion adjacent ot the posterior LV.  No significant valvular disease  - No signs of tamponade on examination    - No evidence of any meaningful volume overload   - He appears to be dry on exam. Encourage fluid/po intake, or IVF    - EKG with nonspecific TWI anteriorly, repeat unchanged  - No evidence of any active ischemia   - Continue statin   - now off ASA in setting of persistent anemia (neg FOBT), with Hgb ~ 6-7. hgb dropped to 6.5, pending today's lab  - continue to trend and transfuse per primary   - Heme and GI following for anemia  - trend hb    - BP labile and soft at times.   - Continue max Norvasc 10,and Lisinopril 40 dose   - Continue Clonidine 0.1 mg PO TID   - Continue coreg 12.5mg BID   - if bp low may need to dec hydral dose.      - continue to monitor routine hemodynamics     - Ortho following for pathological sacral Fx with possible OM.   - No acute orthopedic surgical intervention at this time.
70yo M, PMH DM, HTN, HLD, h/o kidney transplant, hypothyroidism, presents to ED from [facility] for AMS, found to be febrile with positive UA. 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 Cardiology called for pericardial effusion.     - CT chest: Small left pleural effusion with small loculated components, Small to moderate pericardial effusion  - TTE: EF 60%, trace pericardial effusion adjacent ot the posterior LV.  No significant valvular disease  - no signs of tamponade on examination    - No evidence of any meaningful volume overload   - Okay with IV fluids. He appears to be dry on exam. Encourage fluid/po intake     - EKG with nonspecific TWI anteriorly, repeat unchanged  - No evidence of any active ischemia   - Continue statin   - now off ASA in setting of persistent anemia (neg FOBT), with Hgb 6-7    - BP labile and uncontrolled 120-180's systolics   - continue antihypertensive agents on max Norvasc, Hydralazine and Lisinopril dose   - increase clonidine as above  - continue coreg (would uptitate next) if bp remains uncontrolled   - Monitor and replete Lytes. Keep K > 4 and Mg > 2    - Heme and GI following for anemia  - Ortho following for pathological sacral Fx with possible OM.   - No acute orthopedic surgical intervention at this time.
68yo M, PMH DM, HTN, HLD, h/o kidney transplant, hypothyroidism, presents to ED from [facility] for AMS, found to be febrile with positive UA. 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 Cardiology called for pericardial effusion.     - Ct revealed Small left pleural effusion with small loculated components, Small to moderate pericardial effusion. Approximately 5.5 cm low-density lesion anterior right hepatic lobe. Atrophic right kidney. Absent left kidney. 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 Colonic fecal retention. Stercoral proctitis. Patchy sclerosis and osteolysis throughout the bilateral sacrum with pathologic fractures. There is soft tissue with stranding extending throughout the presacral and posterior extraperitoneal pelvic soft tissues. There are a few bubbles of air/gas at the right sacral pathologic fracture, findings suggestive of infection/osteomyelitis.     - Sepsis w/u per medicine.   - abx as per primary    - TTE shows normal LV function with trace pericardial effusion  - Pt w/no signs of tamponade on examination    - EKG with nonspecific TWI  - IV fluids as needed. Avoid reducing preload   - No evidence of any meaningful volume overload     - EKG with nonspecific TWI anteriorly. would repeat ekg   - No evidence of any active ischemia   - Continue aspirin and statin     - -190s   - Continue Norvasc 10, Hydralazine 100 tid, Coreg and, lisinopril 20  - Increase Lisinopril for optimal BP control  - Needs Mg repletion this AM
68yo M, PMH DM, HTN, HLD, h/o kidney transplant, hypothyroidism, presents to ED from [facility] for AMS, found to be febrile with positive UA. 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 Cardiology called for pericardial effusion.     - Ct revealed Small left pleural effusion with small loculated components, Small to moderate pericardial effusion. Approximately 5.5 cm low-density lesion anterior right hepatic lobe. Atrophic right kidney. Absent left kidney. 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 Colonic fecal retention. Stercoral proctitis. Patchy sclerosis and osteolysis throughout the bilateral sacrum with pathologic fractures. There is soft tissue with stranding extending throughout the presacral and posterior extraperitoneal pelvic soft tissues. There are a few bubbles of air/gas at the right sacral pathologic fracture, findings suggestive of infection/osteomyelitis.   - Management per primary team   - Orthopedic seen, f/u MRI LSp/Pelvis,   - No acute orthopedic surgical intervention at this time.    - TTE 11/29 showed normal lv systolic function, ef 60%. normal rv size and function. trace tr. trace pericardial effusion adjacent to posterior lv.   - Pt w/no signs of tamponade on examination    - no evidence of significant vol ol  - Avoid anticoagulants   - IV fluids as needed. Avoid reducing preload    - EKG with nonspecific TWI anteriorly. repeat unchanged  - No evidence of any active ischemia   - Continue aspirin and statin    - BP uncontrolled  - Continue Norvasc, Hydralazine, Lisinopril   - Uptitrate Lisinopril for BP control - first check BP after all AM medications were administered    -anemia payne per primary

## 2024-12-18 NOTE — PROGRESS NOTE ADULT - ASSESSMENT
CKD 3, h/o Kidney transplant ~2012, h/o Left Nephrectomy  Diabetes  Hypertension  Sepsis, UTI, Sacral osteomyelitis, Gram negative bacteremia  Hypokalemia  Hypercalcemia, Elevated 1,25 Vitamin D (ACEI level: WNL), R/o Lymphoma  Hypomagnesemia  Hypophosphatemia  Anemia  + Liver mass    12/16/24: Stable renal indices. To continue current immuno suppressants. Calcium levels remain elevated. Will redose aredia.   Monitor blood sugar levels. Insulin coverage as needed. Dietary restriction. Low BP. Lower lisinopril. Trend BP and titrate BP meds as needed.   Trend BP and titrate BP meds as needed. Hematology follow up. Will follow electrolytes and renal function trend. For liver biopsy.   12/17/24: s/p Liver biopsy. Pathology pending. Monitor calcium trend. Continue steroids for now. D/c planning.   12/18/24: Calcium remains elevated but stable. Continue steroids. Path report pending. Received AREDIA x 2 doses. D/c planning.

## 2024-12-18 NOTE — SOCIAL WORK PROGRESS NOTE - NSSWPROGRESSNOTE_GEN_ALL_CORE
Pt to be DC to [facility] today at 2pm via [place] ([place]). Pt/spouse/MD and facility all aware and in agreement with plan. SW to remain available as needed.

## 2024-12-18 NOTE — PROGRESS NOTE ADULT - ASSESSMENT
IMPRESSION  Anemia CKD  Renal txplant  MGUS  Liver mass    68yo man w hx renal transplant, adm w osteomyelitis, and E coli urosepsis w positive urine and blood cultures  Hgb anemia hgb 6.8    w/u---  no iron, B12 folate deficiency  etiology anemia due to chronic ds, acute illness, inflammation, sepsis  SIFE weak IgM lambda monoclonal gammopathy elevated kappa and Lambda, and K/L ratio, normal IgA/M/G,   5cm liver mass--Prior known, s/p liver bx at [facility], wife brought in report which shows 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   14cm splenomegaly since 2018  -repeat MRI abdomen-6.6cm liver mass, 17.7cm splenomegaly, partially visualized retroperitoneal soft tissue encasing aorta and IVC    -lymphoma ?lymphoplasmacytic disease in view of previous liver bx, and the IgM-Lambda monoclonal gammopathy and previous liver bx result    -s/p liver bx Monday.     -anemia stable    RECOMMENDATIONS    Renal txplant  anti-rejection medications per renal    MGUS and liver mass  followup in office for MGUS and liver bx results  concern for malignancy  Discussed with pt wife at length  Discussed GOC    Hypercalcemia  To start Steroids.     Poor performance status  ECOG PS4    Infection  s/p IV abx    Anemia  conservative mgmt transfusion as needed  cont NARGIS per renal    ESRD on HD per renal    DC planning if stable otherwise  Continue follow serial CBC CMP    Discussed with pt wife.   High risk for readmission  Concern pt performance status may not improve.   GOC and hospice discussed briefly.   Wife would like to await further path report.   has good Understanding of situation, and limitation of current data, pending path.     contact number given for hospital.   TO be DC to [facility] today  To call office for follow up as soon as pt able to transfer to wheelchair.     Generally, no plans for chemo treatment while at rehab as performance status needs to improve.

## 2024-12-18 NOTE — PROGRESS NOTE ADULT - SUBJECTIVE AND OBJECTIVE BOX
CAPILLARY BLOOD GLUCOSE      POCT Blood Glucose.: 197 mg/dL (18 Dec 2024 11:36)  POCT Blood Glucose.: 327 mg/dL (18 Dec 2024 07:40)  POCT Blood Glucose.: 323 mg/dL (17 Dec 2024 20:47)  POCT Blood Glucose.: 257 mg/dL (17 Dec 2024 16:43)      Vital Signs Last 24 Hrs  T(C): 36.5 (18 Dec 2024 12:30), Max: 36.9 (18 Dec 2024 04:55)  T(F): 97.7 (18 Dec 2024 12:30), Max: 98.4 (18 Dec 2024 04:55)  HR: 63 (18 Dec 2024 12:30) (63 - 68)  BP: 153/63 (18 Dec 2024 12:30) (108/64 - 160/54)  BP(mean): --  RR: 18 (18 Dec 2024 12:30) (18 - 18)  SpO2: 100% (18 Dec 2024 12:30) (97% - 100%)    Parameters below as of 18 Dec 2024 12:30  Patient On (Oxygen Delivery Method): room air        General: WN/WD NAD  Respiratory: CTA B/L  CV: RRR, S1S2, no murmurs, rubs or gallops  Abdominal: Soft, NT, ND +BS, Last BM  Extremities: No edema, + peripheral pulses     12-18    134[L]  |  102  |  36[H]  ----------------------------<  259[H]  3.8   |  25  |  1.10    Ca    11.3[H]      18 Dec 2024 09:22    TPro  6.8  /  Alb  2.3[L]  /  TBili  0.3  /  DBili  x   /  AST  29  /  ALT  29  /  AlkPhos  115  12-18      dextrose 50% Injectable 25 Gram(s) IV Push once  dextrose 50% Injectable 12.5 Gram(s) IV Push once  dextrose 50% Injectable 25 Gram(s) IV Push once  dextrose Oral Gel 15 Gram(s) Oral once PRN  glucagon  Injectable 1 milliGRAM(s) IntraMuscular once  insulin glargine Injectable (LANTUS) 30 Unit(s) SubCutaneous at bedtime  insulin lispro (ADMELOG) corrective regimen sliding scale   SubCutaneous three times a day before meals  insulin lispro Injectable (ADMELOG) 10 Unit(s) SubCutaneous three times a day before meals  levothyroxine 88 MICROGram(s) Oral <User Schedule>  predniSONE   Tablet 20 milliGRAM(s) Oral daily  rosuvastatin 10 milliGRAM(s) Oral at bedtime

## 2024-12-18 NOTE — PROGRESS NOTE ADULT - PROBLEM SELECTOR PLAN 1
cont lantus 30 units qhs  admelog increased 10 units 3x/day before meals  cont mod dose admelog corrective scale coverage qac/qhs  cont cons cho diet  goal bg 100-180 in hosp setting
cont lantus 30 units qhs  cont admelog 7 units 3x/day before meals  cont mod dose admelog corrective scale coverage qac/qhs  cont cons cho diet  goal bg 100-180 in hosp setting.
increase lantus 30 units qhs  cont admelog 7 units 3x/day before meals  cont mod dose admelog corrective scale coverage qac/qhs  cont cons cho diet  goal bg 100-180 in hosp setting

## 2024-12-24 LAB — SURGICAL PATHOLOGY STUDY: SIGNIFICANT CHANGE UP

## 2024-12-26 LAB — CHROM ANALY OVERALL INTERP SPEC-IMP: SIGNIFICANT CHANGE UP

## 2024-12-27 ENCOUNTER — INPATIENT (INPATIENT)
Facility: HOSPITAL | Age: 69
LOS: 5 days | Discharge: NURSING, REHAB OR LONG-TERM CARE | DRG: 836 | End: 2025-01-02
Attending: INTERNAL MEDICINE | Admitting: INTERNAL MEDICINE
Payer: MEDICARE

## 2024-12-27 ENCOUNTER — OUTPATIENT (OUTPATIENT)
Dept: OUTPATIENT SERVICES | Facility: HOSPITAL | Age: 69
LOS: 1 days | Discharge: HOME | End: 2024-12-27

## 2024-12-27 ENCOUNTER — APPOINTMENT (OUTPATIENT)
Dept: HEMATOLOGY ONCOLOGY | Facility: CLINIC | Age: 69
End: 2024-12-27
Payer: MEDICARE

## 2024-12-27 VITALS
HEIGHT: 72 IN | SYSTOLIC BLOOD PRESSURE: 161 MMHG | RESPIRATION RATE: 18 BRPM | HEART RATE: 59 BPM | DIASTOLIC BLOOD PRESSURE: 77 MMHG | OXYGEN SATURATION: 98 % | WEIGHT: 168.43 LBS | TEMPERATURE: 98 F

## 2024-12-27 DIAGNOSIS — Z90.49 ACQUIRED ABSENCE OF OTHER SPECIFIED PARTS OF DIGESTIVE TRACT: Chronic | ICD-10-CM

## 2024-12-27 DIAGNOSIS — Z98.42 CATARACT EXTRACTION STATUS, LEFT EYE: Chronic | ICD-10-CM

## 2024-12-27 DIAGNOSIS — I10 ESSENTIAL (PRIMARY) HYPERTENSION: ICD-10-CM

## 2024-12-27 DIAGNOSIS — Z98.41 CATARACT EXTRACTION STATUS, RIGHT EYE: Chronic | ICD-10-CM

## 2024-12-27 DIAGNOSIS — T86.19 OTHER COMPLICATION OF KIDNEY TRANSPLANT: ICD-10-CM

## 2024-12-27 DIAGNOSIS — Z98.89 OTHER SPECIFIED POSTPROCEDURAL STATES: Chronic | ICD-10-CM

## 2024-12-27 DIAGNOSIS — Z90.5 ACQUIRED ABSENCE OF KIDNEY: Chronic | ICD-10-CM

## 2024-12-27 DIAGNOSIS — I77.0 ARTERIOVENOUS FISTULA, ACQUIRED: Chronic | ICD-10-CM

## 2024-12-27 DIAGNOSIS — Z94.0 KIDNEY TRANSPLANT STATUS: ICD-10-CM

## 2024-12-27 DIAGNOSIS — D47.2 MONOCLONAL GAMMOPATHY: ICD-10-CM

## 2024-12-27 DIAGNOSIS — B99.9 UNSPECIFIED INFECTIOUS DISEASE: ICD-10-CM

## 2024-12-27 DIAGNOSIS — E03.9 HYPOTHYROIDISM, UNSPECIFIED: ICD-10-CM

## 2024-12-27 DIAGNOSIS — N13.5 CROSSING VESSEL AND STRICTURE OF URETER WITHOUT HYDRONEPHROSIS: Chronic | ICD-10-CM

## 2024-12-27 DIAGNOSIS — C83.30 DIFFUSE LARGE B-CELL LYMPHOMA, UNSPECIFIED SITE: ICD-10-CM

## 2024-12-27 DIAGNOSIS — E11.9 TYPE 2 DIABETES MELLITUS WITHOUT COMPLICATIONS: ICD-10-CM

## 2024-12-27 DIAGNOSIS — M46.28 OSTEOMYELITIS OF VERTEBRA, SACRAL AND SACROCOCCYGEAL REGION: ICD-10-CM

## 2024-12-27 DIAGNOSIS — G62.9 POLYNEUROPATHY, UNSPECIFIED: ICD-10-CM

## 2024-12-27 DIAGNOSIS — C95.00 ACUTE LEUKEMIA OF UNSPECIFIED CELL TYPE NOT HAVING ACHIEVED REMISSION: ICD-10-CM

## 2024-12-27 DIAGNOSIS — Z94.0 KIDNEY TRANSPLANT STATUS: Chronic | ICD-10-CM

## 2024-12-27 LAB
ADD ON TEST-SPECIMEN IN LAB: SIGNIFICANT CHANGE UP
ADD ON TEST-SPECIMEN IN LAB: SIGNIFICANT CHANGE UP
ALBUMIN SERPL ELPH-MCNC: 3 G/DL — LOW (ref 3.3–5)
ALP SERPL-CCNC: 144 U/L — HIGH (ref 40–120)
ALT FLD-CCNC: 19 U/L — SIGNIFICANT CHANGE UP (ref 10–45)
ANION GAP SERPL CALC-SCNC: 10 MMOL/L — SIGNIFICANT CHANGE UP (ref 5–17)
APTT BLD: 26.1 SEC — SIGNIFICANT CHANGE UP (ref 24.5–35.6)
AST SERPL-CCNC: 30 U/L — SIGNIFICANT CHANGE UP (ref 10–40)
BASOPHILS # BLD AUTO: 0 K/UL — SIGNIFICANT CHANGE UP (ref 0–0.2)
BASOPHILS NFR BLD AUTO: 0 % — SIGNIFICANT CHANGE UP (ref 0–2)
BILIRUB DIRECT SERPL-MCNC: <0.1 MG/DL — SIGNIFICANT CHANGE UP (ref 0–0.3)
BILIRUB INDIRECT FLD-MCNC: >0.2 MG/DL — SIGNIFICANT CHANGE UP (ref 0.2–1)
BILIRUB SERPL-MCNC: 0.3 MG/DL — SIGNIFICANT CHANGE UP (ref 0.2–1.2)
BUN SERPL-MCNC: 28 MG/DL — HIGH (ref 7–23)
CALCIUM SERPL-MCNC: 11.8 MG/DL — HIGH (ref 8.4–10.5)
CHLORIDE SERPL-SCNC: 98 MMOL/L — SIGNIFICANT CHANGE UP (ref 96–108)
CO2 SERPL-SCNC: 24 MMOL/L — SIGNIFICANT CHANGE UP (ref 22–31)
CREAT SERPL-MCNC: 0.78 MG/DL — SIGNIFICANT CHANGE UP (ref 0.5–1.3)
EGFR: 98 ML/MIN/1.73M2 — SIGNIFICANT CHANGE UP
EOSINOPHIL # BLD AUTO: 0.02 K/UL — SIGNIFICANT CHANGE UP (ref 0–0.5)
EOSINOPHIL NFR BLD AUTO: 0.4 % — SIGNIFICANT CHANGE UP (ref 0–6)
GLUCOSE BLDC GLUCOMTR-MCNC: 323 MG/DL — HIGH (ref 70–99)
GLUCOSE BLDC GLUCOMTR-MCNC: 367 MG/DL — HIGH (ref 70–99)
GLUCOSE SERPL-MCNC: 257 MG/DL — HIGH (ref 70–99)
HCT VFR BLD CALC: 26.9 % — LOW (ref 39–50)
HGB BLD-MCNC: 8.6 G/DL — LOW (ref 13–17)
IMM GRANULOCYTES NFR BLD AUTO: 0.7 % — SIGNIFICANT CHANGE UP (ref 0–0.9)
INR BLD: 1 RATIO — SIGNIFICANT CHANGE UP (ref 0.85–1.16)
LYMPHOCYTES # BLD AUTO: 0.11 K/UL — LOW (ref 1–3.3)
LYMPHOCYTES # BLD AUTO: 2.4 % — LOW (ref 13–44)
MAGNESIUM SERPL-MCNC: 1.7 MG/DL — SIGNIFICANT CHANGE UP (ref 1.6–2.6)
MCHC RBC-ENTMCNC: 29.6 PG — SIGNIFICANT CHANGE UP (ref 27–34)
MCHC RBC-ENTMCNC: 32 G/DL — SIGNIFICANT CHANGE UP (ref 32–36)
MCV RBC AUTO: 92.4 FL — SIGNIFICANT CHANGE UP (ref 80–100)
MONOCYTES # BLD AUTO: 0.2 K/UL — SIGNIFICANT CHANGE UP (ref 0–0.9)
MONOCYTES NFR BLD AUTO: 4.4 % — SIGNIFICANT CHANGE UP (ref 2–14)
NEUTROPHILS # BLD AUTO: 4.21 K/UL — SIGNIFICANT CHANGE UP (ref 1.8–7.4)
NEUTROPHILS NFR BLD AUTO: 92.1 % — HIGH (ref 43–77)
NRBC # BLD: 0 /100 WBCS — SIGNIFICANT CHANGE UP (ref 0–0)
PHOSPHATE SERPL-MCNC: 2.2 MG/DL — LOW (ref 2.5–4.5)
PLATELET # BLD AUTO: 208 K/UL — SIGNIFICANT CHANGE UP (ref 150–400)
POTASSIUM SERPL-MCNC: 4.5 MMOL/L — SIGNIFICANT CHANGE UP (ref 3.5–5.3)
POTASSIUM SERPL-SCNC: 4.5 MMOL/L — SIGNIFICANT CHANGE UP (ref 3.5–5.3)
PROT SERPL-MCNC: 7.1 G/DL — SIGNIFICANT CHANGE UP (ref 6–8.3)
PROTHROM AB SERPL-ACNC: 11.5 SEC — SIGNIFICANT CHANGE UP (ref 9.9–13.4)
RBC # BLD: 2.91 M/UL — LOW (ref 4.2–5.8)
RBC # FLD: 19.1 % — HIGH (ref 10.3–14.5)
SODIUM SERPL-SCNC: 132 MMOL/L — LOW (ref 135–145)
URATE SERPL-MCNC: 6.7 MG/DL — SIGNIFICANT CHANGE UP (ref 3.4–8.8)
WBC # BLD: 4.57 K/UL — SIGNIFICANT CHANGE UP (ref 3.8–10.5)
WBC # FLD AUTO: 4.57 K/UL — SIGNIFICANT CHANGE UP (ref 3.8–10.5)

## 2024-12-27 PROCEDURE — 78472 GATED HEART PLANAR SINGLE: CPT | Mod: 26

## 2024-12-27 PROCEDURE — G2211 COMPLEX E/M VISIT ADD ON: CPT

## 2024-12-27 PROCEDURE — 99205 OFFICE O/P NEW HI 60 MIN: CPT

## 2024-12-27 PROCEDURE — 93010 ELECTROCARDIOGRAM REPORT: CPT

## 2024-12-27 RX ORDER — INSULIN LISPRO 100/ML
VIAL (ML) SUBCUTANEOUS
Refills: 0 | Status: DISCONTINUED | OUTPATIENT
Start: 2024-12-27 | End: 2024-12-28

## 2024-12-27 RX ORDER — TACROLIMUS 0.5 MG/1
1 CAPSULE ORAL
Refills: 0 | Status: DISCONTINUED | OUTPATIENT
Start: 2024-12-27 | End: 2024-12-30

## 2024-12-27 RX ORDER — LEVOTHYROXINE SODIUM 175 UG/1
88 TABLET ORAL DAILY
Refills: 0 | Status: DISCONTINUED | OUTPATIENT
Start: 2024-12-27 | End: 2025-01-02

## 2024-12-27 RX ORDER — DEXTROSE MONOHYDRATE 25 G/50ML
25 INJECTION, SOLUTION INTRAVENOUS ONCE
Refills: 0 | Status: DISCONTINUED | OUTPATIENT
Start: 2024-12-27 | End: 2025-01-02

## 2024-12-27 RX ORDER — ESCITALOPRAM OXALATE 10 MG/1
10 TABLET ORAL DAILY
Refills: 0 | Status: DISCONTINUED | OUTPATIENT
Start: 2024-12-27 | End: 2025-01-02

## 2024-12-27 RX ORDER — MAGNESIUM SULFATE 500 MG/ML
1 INJECTION, SOLUTION INTRAMUSCULAR; INTRAVENOUS ONCE
Refills: 0 | Status: COMPLETED | OUTPATIENT
Start: 2024-12-27 | End: 2024-12-27

## 2024-12-27 RX ORDER — INSULIN GLARGINE-YFGN 100 [IU]/ML
10 INJECTION, SOLUTION SUBCUTANEOUS AT BEDTIME
Refills: 0 | Status: DISCONTINUED | OUTPATIENT
Start: 2024-12-27 | End: 2024-12-28

## 2024-12-27 RX ORDER — INSULIN LISPRO 100/ML
VIAL (ML) SUBCUTANEOUS AT BEDTIME
Refills: 0 | Status: DISCONTINUED | OUTPATIENT
Start: 2024-12-27 | End: 2024-12-28

## 2024-12-27 RX ORDER — SODIUM CHLORIDE 9 MG/ML
1000 INJECTION, SOLUTION INTRAVENOUS
Refills: 0 | Status: DISCONTINUED | OUTPATIENT
Start: 2024-12-27 | End: 2025-01-02

## 2024-12-27 RX ORDER — DEXTROSE MONOHYDRATE 25 G/50ML
15 INJECTION, SOLUTION INTRAVENOUS ONCE
Refills: 0 | Status: DISCONTINUED | OUTPATIENT
Start: 2024-12-27 | End: 2025-01-02

## 2024-12-27 RX ORDER — POTASSIUM PHOSPHATE, MONOBASIC AND POTASSIUM PHOSPHATE, DIBASIC 224; 236 MG/ML; MG/ML
15 INJECTION, SOLUTION INTRAVENOUS ONCE
Refills: 0 | Status: DISCONTINUED | OUTPATIENT
Start: 2024-12-27 | End: 2024-12-27

## 2024-12-27 RX ORDER — CARVEDILOL 25 MG/1
12.5 TABLET, FILM COATED ORAL EVERY 12 HOURS
Refills: 0 | Status: DISCONTINUED | OUTPATIENT
Start: 2024-12-27 | End: 2025-01-02

## 2024-12-27 RX ORDER — GLUCAGON INJECTION, SOLUTION 0.5 MG/.1ML
1 INJECTION, SOLUTION SUBCUTANEOUS ONCE
Refills: 0 | Status: DISCONTINUED | OUTPATIENT
Start: 2024-12-27 | End: 2025-01-02

## 2024-12-27 RX ORDER — ALLOPURINOL 100 MG/1
100 TABLET ORAL DAILY
Refills: 0 | Status: DISCONTINUED | OUTPATIENT
Start: 2024-12-27 | End: 2025-01-02

## 2024-12-27 RX ORDER — SODIUM CHLORIDE 9 MG/ML
1000 INJECTION, SOLUTION INTRAMUSCULAR; INTRAVENOUS; SUBCUTANEOUS
Refills: 0 | Status: DISCONTINUED | OUTPATIENT
Start: 2024-12-27 | End: 2025-01-02

## 2024-12-27 RX ORDER — SODIUM PHOSPHATE, MONOBASIC, MONOHYDRATE AND SODIUM PHOSPHATE, DIBASIC ANHYDROUS 142; 276 MG/ML; MG/ML
15 INJECTION, SOLUTION INTRAVENOUS ONCE
Refills: 0 | Status: COMPLETED | OUTPATIENT
Start: 2024-12-27 | End: 2024-12-27

## 2024-12-27 RX ORDER — TACROLIMUS 0.5 MG/1
2 CAPSULE ORAL
Refills: 0 | Status: DISCONTINUED | OUTPATIENT
Start: 2024-12-28 | End: 2024-12-30

## 2024-12-27 RX ORDER — BUPROPION HYDROCHLORIDE 150 MG/1
300 TABLET, EXTENDED RELEASE ORAL DAILY
Refills: 0 | Status: DISCONTINUED | OUTPATIENT
Start: 2024-12-28 | End: 2025-01-02

## 2024-12-27 RX ORDER — HEPARIN SODIUM 1000 [USP'U]/ML
5000 INJECTION, SOLUTION INTRAVENOUS; SUBCUTANEOUS EVERY 12 HOURS
Refills: 0 | Status: DISCONTINUED | OUTPATIENT
Start: 2024-12-27 | End: 2024-12-31

## 2024-12-27 RX ORDER — ACETAMINOPHEN 80 MG/.8ML
650 SOLUTION/ DROPS ORAL EVERY 6 HOURS
Refills: 0 | Status: DISCONTINUED | OUTPATIENT
Start: 2024-12-27 | End: 2025-01-02

## 2024-12-27 RX ORDER — PREDNISONE 5 MG
20 TABLET ORAL DAILY
Refills: 0 | Status: DISCONTINUED | OUTPATIENT
Start: 2024-12-27 | End: 2024-12-28

## 2024-12-27 RX ORDER — LISINOPRIL 30 MG/1
20 TABLET ORAL DAILY
Refills: 0 | Status: DISCONTINUED | OUTPATIENT
Start: 2024-12-27 | End: 2025-01-02

## 2024-12-27 RX ORDER — DEXTROSE MONOHYDRATE 25 G/50ML
12.5 INJECTION, SOLUTION INTRAVENOUS ONCE
Refills: 0 | Status: DISCONTINUED | OUTPATIENT
Start: 2024-12-27 | End: 2025-01-02

## 2024-12-27 RX ADMIN — INSULIN GLARGINE-YFGN 10 UNIT(S): 100 INJECTION, SOLUTION SUBCUTANEOUS at 22:01

## 2024-12-27 RX ADMIN — TACROLIMUS 1 MILLIGRAM(S): 0.5 CAPSULE ORAL at 20:20

## 2024-12-27 RX ADMIN — Medication 5: at 22:32

## 2024-12-27 RX ADMIN — Medication 4: at 18:08

## 2024-12-27 RX ADMIN — SODIUM PHOSPHATE, MONOBASIC, MONOHYDRATE AND SODIUM PHOSPHATE, DIBASIC ANHYDROUS 63.75 MILLIMOLE(S): 142; 276 INJECTION, SOLUTION INTRAVENOUS at 22:01

## 2024-12-27 RX ADMIN — Medication 20 MILLIGRAM(S): at 18:13

## 2024-12-27 RX ADMIN — MAGNESIUM SULFATE 100 GRAM(S): 500 INJECTION, SOLUTION INTRAMUSCULAR; INTRAVENOUS at 18:08

## 2024-12-27 RX ADMIN — ALLOPURINOL 100 MILLIGRAM(S): 100 TABLET ORAL at 18:56

## 2024-12-27 NOTE — H&P ADULT - PROBLEM SELECTOR PLAN 6
on azathioprine 50 mg daily, prednisone 20 mg daily,   Renal transplant consult, given intense immunosuppression with chemotherapy, consider holding azathioprine and tacro

## 2024-12-27 NOTE — PATIENT PROFILE ADULT - FALL HARM RISK - HARM RISK INTERVENTIONS
Assistance with ambulation/Assistance OOB with selected safe patient handling equipment/Communicate Risk of Fall with Harm to all staff/Discuss with provider need for PT consult/Monitor gait and stability/Reinforce activity limits and safety measures with patient and family/Review medications for side effects contributing to fall risk/Sit up slowly, dangle for a short time, stand at bedside before walking/Tailored Fall Risk Interventions/Toileting schedule using arm’s reach rule for commode and bathroom/Visual Cue: Yellow wristband and red socks/Bed in lowest position, wheels locked, appropriate side rails in place/Call bell, personal items and telephone in reach/Instruct patient to call for assistance before getting out of bed or chair/Non-slip footwear when patient is out of bed/Kiefer to call system/Physically safe environment - no spills, clutter or unnecessary equipment/Purposeful Proactive Rounding/Room/bathroom lighting operational, light cord in reach

## 2024-12-27 NOTE — H&P ADULT - NSHPLABSRESULTS_GEN_ALL_CORE
8.6    4.57  )-----------( 208      ( 27 Dec 2024 13:44 )             26.9       12-27    132[L]  |  98  |  28[H]  ----------------------------<  257[H]  4.5   |  24  |  0.78    Ca    11.8[H]      27 Dec 2024 13:44  Phos  2.2     12-27  Mg     1.7     12-27    TPro  7.1  /  Alb  3.0[L]  /  TBili  0.3  /  DBili  <0.1  /  AST  30  /  ALT  19  /  AlkPhos  144[H]  12-27              Urinalysis Basic - ( 27 Dec 2024 13:44 )    Color: x / Appearance: x / SG: x / pH: x  Gluc: 257 mg/dL / Ketone: x  / Bili: x / Urobili: x   Blood: x / Protein: x / Nitrite: x   Leuk Esterase: x / RBC: x / WBC x   Sq Epi: x / Non Sq Epi: x / Bacteria: x        PT/INR - ( 27 Dec 2024 13:44 )   PT: 11.5 sec;   INR: 1.00 ratio         PTT - ( 27 Dec 2024 13:44 )  PTT:26.1 sec    Lactate Trend            CAPILLARY BLOOD GLUCOSE CT C/A/P non contrast 11/29/24    IMPRESSION:    Pathologic fractures of the bilateral sacrum with mottled sclerosis/osteolysis, presacral/posterior pelvic extraperitoneal soft tissue stranding and small bubbles of gas/air; findings suggestive of infection/osteomyelitis.    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    Low-density lesion right hepatic lobe, which is poorly characterized on this noncontrast exam.  Recommend further evaluation with MRI.    Bladder wall thickening, correlate for cystitis.    Small left-sided pleural effusion with small loculated component.  Partial left lower lobe atelectasis.          MR Abdomen 12/9/24:        Solid enhancing masses in the liver and spleen.  Lobulated T2 hyperintense mass with mild heterogeneous enhancement in segment 5/8, measuring 5.6 x 5.5 x 6.6 cm. The mass demonstrates intense restricted diffusion with a central T2 hyperintense region that has delayed enhancement. No discrete evidence of washout.  Differential diagnosis includes lymphoma and metastatic disease. Primary hepatobiliary malignancy is a less likely consideration for the liver lesion.    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

## 2024-12-27 NOTE — H&P ADULT - NSHPPHYSICALEXAM_GEN_ALL_CORE
PHYSICAL EXAM:    General: Not in distress. Well-developed, speaks in full sentences. AAOx3  HEENT: Atraumatic, normocephalic. Moist mucus membranes. PERRLA.  Cardio: Regular rate and rhythm. S1, S2 appreciated. no murmurs.  Pulm: Bilateral breath sounds. No wheezing, or rhonchi  Abdomen: Soft, non-tender, non-distended. Normoactive bowel sounds.  Extremities: No cyanosis or edema bilaterally. No calf tenderness to palpation.  Neuro: No focal deficits. Motor 5/5 throughout. Sensation intact

## 2024-12-27 NOTE — H&P ADULT - PROBLEM SELECTOR PLAN 8
on amlodipine 10 mg daily, carvedilol 12.5 mg BID, clonidine 0.1 mg TID, Hydralazine 100 mg TID, lisinopril 20 mg daily

## 2024-12-27 NOTE — CONSULT NOTE ADULT - ASSESSMENT
69-year-old male with PMHx of DM, HTN, HLD, ESRD on HD s/p LKRT 2012 (from brother), hypothyroidism, recent admission to [facility] for AMS found to have sacral OM and E. Coli bacteremia s/p treatment with meropenam till 12/10, complicated by hypercalcemia  (s/p calcitonin, aredia, and started on prednisone by nephrology), found to have new DLBCL on liver biopsy transferred to [facility] to start R-CHOP. Transplant Nephrology consulted for post transplant management and IS.      1. s/p LKRT 2012 and L native nephrectomy.   Per Hillary ZEPEDA/[facility], pt. baseline SCR ~1.0. Pt. SCr is at baseline.   - Obtain U/A  - Monitor labs and I/Os. Avoid nephrotoxins including, ACE/ARB, NSAIDs, contrast, etc. Dose medications as per eGFR.     2. Immunosuppression  Pt. is on Tacrolimus 2mg AM/1mg PM, imuran 50mg BID, and prednisone 20mg for hypercalcemia.   - Continue tacrolimus and prednisone   - Hold imuran in setting of recent infection and chemotherapy.     3. Hypercalcemia  Calcium on admission to [facility] 11.8. Likely related to new DLBCL diagnosis.   - Recommend Vitamin D 1,25 and 25-OH, PTH, ionized Ca  - Recommend maintenance  IVFs for now  - Recommend getting results from [facility] regarding dates of pamidronate and calcitonin treatment.     If you have any questions, please feel free to contact me:  Sophia Cordoba MD PGY-4  Nephrology Fellow  [employer] Teams (Preferred)/ Pager [phone]   (After 5pm or on weekends please page the on-call fellow)

## 2024-12-27 NOTE — PATIENT PROFILE ADULT - FALL HARM RISK - FACTORS
Impaired gait/IV and/or equipment tethered to patient/Medication side effects/Other medical problems/Poor balance/Weakness

## 2024-12-27 NOTE — H&P ADULT - HISTORY OF PRESENT ILLNESS
68 y/o M PMHx renal transplant 2012 (2/2 DM, s/p left nephrectomy), peripheral neuropathy, hypothyroidism, retroperitoneal fibrosis, HTN, HLD, GERD, anxiety/depression, ANGELIKA and recent admission at [facility] for sacral osteomyelitis and ESBL.coli urosepsis discharged on 12/18/24 to rehab. While admitted to [facility] was noted to have hypercalcemia and liver masses which were biopsied on 12/16/24, biopsy revealed DLBCL. He had a virtual consult with Dr. Garcia today and recommended admission for further treatment.

## 2024-12-27 NOTE — H&P ADULT - PROBLEM SELECTOR PLAN 1
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  5cm liver mass--Prior known, s/p liver bx at [facility] showed 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 also noted  14cm splenomegaly since 2018  repeat MRI abdomen 12/2024: 6.6cm liver mass, 17.7cm splenomegaly, partially visualized  retroperitoneal soft tissue encasing aorta and IVC  - recommend start Allopurinol 100 mg daily   - recommend IVF: NS at 100 cc/hr if evidence of TLS or not tolerating PO   - Please order: G6PD, hepatitis B serologies (including core antibody total) and hepatitis C, HIV  - Check: TLS labs every 8 hours (CMP, Phos, LDH, uric acid)  - give rasburicase 3mg IV for uric acid > 8.0  - Transfuse if Hgb < 7.0.

## 2024-12-27 NOTE — H&P ADULT - ASSESSMENT
70 y/o M who was found to have hypercalcemia and liver masses at [facility] while undergoing treatment for osteomyelitis is now admitted for management of DLBCL 68 y/o M who was found to have hypercalcemia and liver masses at [facility] while undergoing treatment for osteomyelitis is now admitted for management of DLBCL (PTLD)

## 2024-12-28 DIAGNOSIS — Z29.9 ENCOUNTER FOR PROPHYLACTIC MEASURES, UNSPECIFIED: ICD-10-CM

## 2024-12-28 LAB
24R-OH-CALCIDIOL SERPL-MCNC: 39.8 NG/ML — SIGNIFICANT CHANGE UP (ref 30–80)
A1C WITH ESTIMATED AVERAGE GLUCOSE RESULT: 7.7 % — HIGH (ref 4–5.6)
ALBUMIN SERPL ELPH-MCNC: 2.8 G/DL — LOW (ref 3.3–5)
ALBUMIN SERPL ELPH-MCNC: 2.8 G/DL — LOW (ref 3.3–5)
ALP SERPL-CCNC: 128 U/L — HIGH (ref 40–120)
ALP SERPL-CCNC: 132 U/L — HIGH (ref 40–120)
ALT FLD-CCNC: 14 U/L — SIGNIFICANT CHANGE UP (ref 10–45)
ALT FLD-CCNC: 17 U/L — SIGNIFICANT CHANGE UP (ref 10–45)
ANION GAP SERPL CALC-SCNC: 10 MMOL/L — SIGNIFICANT CHANGE UP (ref 5–17)
ANION GAP SERPL CALC-SCNC: 11 MMOL/L — SIGNIFICANT CHANGE UP (ref 5–17)
AST SERPL-CCNC: 22 U/L — SIGNIFICANT CHANGE UP (ref 10–40)
AST SERPL-CCNC: 24 U/L — SIGNIFICANT CHANGE UP (ref 10–40)
B-OH-BUTYR SERPL-SCNC: 0.1 MMOL/L — SIGNIFICANT CHANGE UP
BASE EXCESS BLDV CALC-SCNC: 3.2 MMOL/L — HIGH (ref -2–3)
BASOPHILS # BLD AUTO: 0 K/UL — SIGNIFICANT CHANGE UP (ref 0–0.2)
BASOPHILS NFR BLD AUTO: 0 % — SIGNIFICANT CHANGE UP (ref 0–2)
BILIRUB SERPL-MCNC: 0.3 MG/DL — SIGNIFICANT CHANGE UP (ref 0.2–1.2)
BILIRUB SERPL-MCNC: 0.3 MG/DL — SIGNIFICANT CHANGE UP (ref 0.2–1.2)
BUN SERPL-MCNC: 29 MG/DL — HIGH (ref 7–23)
BUN SERPL-MCNC: 31 MG/DL — HIGH (ref 7–23)
CA-I BLD-SCNC: 1.55 MMOL/L — HIGH (ref 1.15–1.33)
CALCIUM SERPL-MCNC: 10.8 MG/DL — HIGH (ref 8.4–10.5)
CALCIUM SERPL-MCNC: 11 MG/DL — HIGH (ref 8.4–10.5)
CALCIUM SERPL-MCNC: 11.2 MG/DL — HIGH (ref 8.4–10.5)
CHLORIDE SERPL-SCNC: 97 MMOL/L — SIGNIFICANT CHANGE UP (ref 96–108)
CHLORIDE SERPL-SCNC: 99 MMOL/L — SIGNIFICANT CHANGE UP (ref 96–108)
CO2 BLDV-SCNC: 30 MMOL/L — HIGH (ref 22–26)
CO2 SERPL-SCNC: 25 MMOL/L — SIGNIFICANT CHANGE UP (ref 22–31)
CO2 SERPL-SCNC: 26 MMOL/L — SIGNIFICANT CHANGE UP (ref 22–31)
CREAT SERPL-MCNC: 0.73 MG/DL — SIGNIFICANT CHANGE UP (ref 0.5–1.3)
CREAT SERPL-MCNC: 0.98 MG/DL — SIGNIFICANT CHANGE UP (ref 0.5–1.3)
EBV EA AB SER IA-ACNC: 8.75 U/ML — SIGNIFICANT CHANGE UP
EBV EA AB TITR SER IF: POSITIVE
EBV EA IGG SER-ACNC: NEGATIVE — SIGNIFICANT CHANGE UP
EBV NA IGG SER IA-ACNC: >600 U/ML — HIGH
EBV PATRN SPEC IB-IMP: SIGNIFICANT CHANGE UP
EBV VCA IGG AVIDITY SER QL IA: POSITIVE
EBV VCA IGM SER IA-ACNC: <10 U/ML — SIGNIFICANT CHANGE UP
EBV VCA IGM SER IA-ACNC: >750 U/ML — HIGH
EBV VCA IGM TITR FLD: NEGATIVE — SIGNIFICANT CHANGE UP
EGFR: 100 ML/MIN/1.73M2 — SIGNIFICANT CHANGE UP
EGFR: 85 ML/MIN/1.73M2 — SIGNIFICANT CHANGE UP
EOSINOPHIL # BLD AUTO: 0 K/UL — SIGNIFICANT CHANGE UP (ref 0–0.5)
EOSINOPHIL NFR BLD AUTO: 0 % — SIGNIFICANT CHANGE UP (ref 0–6)
ESTIMATED AVERAGE GLUCOSE: 174 MG/DL — HIGH (ref 68–114)
GLUCOSE BLDC GLUCOMTR-MCNC: 321 MG/DL — HIGH (ref 70–99)
GLUCOSE BLDC GLUCOMTR-MCNC: 335 MG/DL — HIGH (ref 70–99)
GLUCOSE BLDC GLUCOMTR-MCNC: 364 MG/DL — HIGH (ref 70–99)
GLUCOSE BLDC GLUCOMTR-MCNC: 416 MG/DL — HIGH (ref 70–99)
GLUCOSE BLDC GLUCOMTR-MCNC: 430 MG/DL — HIGH (ref 70–99)
GLUCOSE BLDC GLUCOMTR-MCNC: 464 MG/DL — CRITICAL HIGH (ref 70–99)
GLUCOSE BLDC GLUCOMTR-MCNC: 514 MG/DL — CRITICAL HIGH (ref 70–99)
GLUCOSE SERPL-MCNC: 377 MG/DL — HIGH (ref 70–99)
GLUCOSE SERPL-MCNC: 429 MG/DL — HIGH (ref 70–99)
GLUCOSE SERPL-MCNC: 430 MG/DL — HIGH (ref 70–99)
HAV IGM SER-ACNC: SIGNIFICANT CHANGE UP
HBV CORE IGM SER-ACNC: SIGNIFICANT CHANGE UP
HBV SURFACE AG SER-ACNC: SIGNIFICANT CHANGE UP
HCO3 BLDV-SCNC: 28 MMOL/L — SIGNIFICANT CHANGE UP (ref 22–29)
HCT VFR BLD CALC: 24 % — LOW (ref 39–50)
HCV AB S/CO SERPL IA: 0.25 S/CO — SIGNIFICANT CHANGE UP (ref 0–0.99)
HCV AB S/CO SERPL IA: 0.25 S/CO — SIGNIFICANT CHANGE UP (ref 0–0.99)
HCV AB SERPL-IMP: SIGNIFICANT CHANGE UP
HCV AB SERPL-IMP: SIGNIFICANT CHANGE UP
HGB BLD-MCNC: 7.9 G/DL — LOW (ref 13–17)
HIV 1+2 AB+HIV1 P24 AG SERPL QL IA: SIGNIFICANT CHANGE UP
IMM GRANULOCYTES NFR BLD AUTO: 0.5 % — SIGNIFICANT CHANGE UP (ref 0–0.9)
LACTATE SERPL-SCNC: 3.1 MMOL/L — HIGH (ref 0.5–2)
LDH SERPL L TO P-CCNC: 382 U/L — HIGH (ref 50–242)
LDH SERPL L TO P-CCNC: 399 U/L — HIGH (ref 50–242)
LYMPHOCYTES # BLD AUTO: 0.1 K/UL — LOW (ref 1–3.3)
LYMPHOCYTES # BLD AUTO: 2.5 % — LOW (ref 13–44)
MAGNESIUM SERPL-MCNC: 1.6 MG/DL — SIGNIFICANT CHANGE UP (ref 1.6–2.6)
MAGNESIUM SERPL-MCNC: 1.7 MG/DL — SIGNIFICANT CHANGE UP (ref 1.6–2.6)
MCHC RBC-ENTMCNC: 30 PG — SIGNIFICANT CHANGE UP (ref 27–34)
MCHC RBC-ENTMCNC: 32.9 G/DL — SIGNIFICANT CHANGE UP (ref 32–36)
MCV RBC AUTO: 91.3 FL — SIGNIFICANT CHANGE UP (ref 80–100)
MONOCYTES # BLD AUTO: 0.14 K/UL — SIGNIFICANT CHANGE UP (ref 0–0.9)
MONOCYTES NFR BLD AUTO: 3.5 % — SIGNIFICANT CHANGE UP (ref 2–14)
NEUTROPHILS # BLD AUTO: 3.72 K/UL — SIGNIFICANT CHANGE UP (ref 1.8–7.4)
NEUTROPHILS NFR BLD AUTO: 93.5 % — HIGH (ref 43–77)
NRBC # BLD: 0 /100 WBCS — SIGNIFICANT CHANGE UP (ref 0–0)
PCO2 BLDV: 45 MMHG — SIGNIFICANT CHANGE UP (ref 42–55)
PH BLDV: 7.41 — SIGNIFICANT CHANGE UP (ref 7.32–7.43)
PHOSPHATE SERPL-MCNC: 3.1 MG/DL — SIGNIFICANT CHANGE UP (ref 2.5–4.5)
PHOSPHATE SERPL-MCNC: 3.3 MG/DL — SIGNIFICANT CHANGE UP (ref 2.5–4.5)
PLATELET # BLD AUTO: 163 K/UL — SIGNIFICANT CHANGE UP (ref 150–400)
PO2 BLDV: 52 MMHG — HIGH (ref 25–45)
POTASSIUM SERPL-MCNC: 4 MMOL/L — SIGNIFICANT CHANGE UP (ref 3.5–5.3)
POTASSIUM SERPL-MCNC: 4.3 MMOL/L — SIGNIFICANT CHANGE UP (ref 3.5–5.3)
POTASSIUM SERPL-SCNC: 4 MMOL/L — SIGNIFICANT CHANGE UP (ref 3.5–5.3)
POTASSIUM SERPL-SCNC: 4.3 MMOL/L — SIGNIFICANT CHANGE UP (ref 3.5–5.3)
PROT SERPL-MCNC: 6.4 G/DL — SIGNIFICANT CHANGE UP (ref 6–8.3)
PROT SERPL-MCNC: 6.4 G/DL — SIGNIFICANT CHANGE UP (ref 6–8.3)
PTH-INTACT FLD-MCNC: 14 PG/ML — LOW (ref 15–65)
RBC # BLD: 2.63 M/UL — LOW (ref 4.2–5.8)
RBC # FLD: 18.4 % — HIGH (ref 10.3–14.5)
SAO2 % BLDV: 82.1 % — SIGNIFICANT CHANGE UP (ref 67–88)
SODIUM SERPL-SCNC: 132 MMOL/L — LOW (ref 135–145)
SODIUM SERPL-SCNC: 136 MMOL/L — SIGNIFICANT CHANGE UP (ref 135–145)
T3 SERPL-MCNC: 34 NG/DL — LOW (ref 80–200)
T4 AB SER-ACNC: 6.3 UG/DL — SIGNIFICANT CHANGE UP (ref 4.6–12)
TACROLIMUS SERPL-MCNC: 8.1 NG/ML — SIGNIFICANT CHANGE UP
TSH SERPL-MCNC: 0.36 UIU/ML — SIGNIFICANT CHANGE UP (ref 0.27–4.2)
URATE SERPL-MCNC: 5.2 MG/DL — SIGNIFICANT CHANGE UP (ref 3.4–8.8)
URATE SERPL-MCNC: 5.7 MG/DL — SIGNIFICANT CHANGE UP (ref 3.4–8.8)
WBC # BLD: 3.98 K/UL — SIGNIFICANT CHANGE UP (ref 3.8–10.5)
WBC # FLD AUTO: 3.98 K/UL — SIGNIFICANT CHANGE UP (ref 3.8–10.5)

## 2024-12-28 PROCEDURE — 99233 SBSQ HOSP IP/OBS HIGH 50: CPT

## 2024-12-28 PROCEDURE — 71045 X-RAY EXAM CHEST 1 VIEW: CPT | Mod: 26

## 2024-12-28 RX ORDER — ACETAMINOPHEN 80 MG/.8ML
650 SOLUTION/ DROPS ORAL ONCE
Refills: 0 | Status: COMPLETED | OUTPATIENT
Start: 2024-12-28 | End: 2024-12-28

## 2024-12-28 RX ORDER — SODIUM CHLORIDE 9 MG/ML
10 INJECTION, SOLUTION INTRAMUSCULAR; INTRAVENOUS; SUBCUTANEOUS
Refills: 0 | Status: DISCONTINUED | OUTPATIENT
Start: 2024-12-28 | End: 2025-01-02

## 2024-12-28 RX ORDER — CHLORHEXIDINE GLUCONATE 1.2 MG/ML
1 RINSE ORAL
Refills: 0 | Status: DISCONTINUED | OUTPATIENT
Start: 2024-12-28 | End: 2025-01-02

## 2024-12-28 RX ORDER — INSULIN LISPRO 100/ML
15 VIAL (ML) SUBCUTANEOUS
Refills: 0 | Status: DISCONTINUED | OUTPATIENT
Start: 2024-12-28 | End: 2024-12-29

## 2024-12-28 RX ORDER — PREDNISONE 5 MG
20 TABLET ORAL DAILY
Refills: 0 | Status: DISCONTINUED | OUTPATIENT
Start: 2025-01-03 | End: 2025-01-02

## 2024-12-28 RX ORDER — RITUXIMAB 10 MG/ML
743 INJECTION, SOLUTION INTRAVENOUS ONCE
Refills: 0 | Status: COMPLETED | OUTPATIENT
Start: 2024-12-28 | End: 2024-12-28

## 2024-12-28 RX ORDER — INSULIN LISPRO 100/ML
12 VIAL (ML) SUBCUTANEOUS
Refills: 0 | Status: DISCONTINUED | OUTPATIENT
Start: 2024-12-28 | End: 2024-12-28

## 2024-12-28 RX ORDER — INSULIN LISPRO 100/ML
VIAL (ML) SUBCUTANEOUS
Refills: 0 | Status: DISCONTINUED | OUTPATIENT
Start: 2024-12-28 | End: 2025-01-02

## 2024-12-28 RX ORDER — CALCITONIN,SALMON,SYNTHETIC 200/SPRAY
310 AEROSOL, SPRAY WITH PUMP (ML) NASAL EVERY 12 HOURS
Refills: 0 | Status: COMPLETED | OUTPATIENT
Start: 2024-12-28 | End: 2024-12-29

## 2024-12-28 RX ORDER — HYDROCORTISONE 100 MG/60ML
100 ENEMA RECTAL ONCE
Refills: 0 | Status: COMPLETED | OUTPATIENT
Start: 2024-12-28 | End: 2024-12-28

## 2024-12-28 RX ORDER — BACLOFEN 10 MG/1
5 TABLET ORAL ONCE
Refills: 0 | Status: COMPLETED | OUTPATIENT
Start: 2024-12-28 | End: 2024-12-28

## 2024-12-28 RX ORDER — INSULIN GLARGINE-YFGN 100 [IU]/ML
40 INJECTION, SOLUTION SUBCUTANEOUS AT BEDTIME
Refills: 0 | Status: DISCONTINUED | OUTPATIENT
Start: 2024-12-28 | End: 2024-12-29

## 2024-12-28 RX ORDER — POLYETHYLENE GLYCOL 3350 17 G/DOSE
17 POWDER (GRAM) ORAL
Refills: 0 | Status: DISCONTINUED | OUTPATIENT
Start: 2024-12-28 | End: 2025-01-02

## 2024-12-28 RX ORDER — INSULIN LISPRO 100/ML
VIAL (ML) SUBCUTANEOUS AT BEDTIME
Refills: 0 | Status: DISCONTINUED | OUTPATIENT
Start: 2024-12-28 | End: 2025-01-02

## 2024-12-28 RX ORDER — DIPHENHYDRAMINE HCL 25 MG
50 TABLET ORAL ONCE
Refills: 0 | Status: COMPLETED | OUTPATIENT
Start: 2024-12-28 | End: 2024-12-28

## 2024-12-28 RX ORDER — INSULIN GLARGINE-YFGN 100 [IU]/ML
35 INJECTION, SOLUTION SUBCUTANEOUS AT BEDTIME
Refills: 0 | Status: DISCONTINUED | OUTPATIENT
Start: 2024-12-28 | End: 2024-12-28

## 2024-12-28 RX ADMIN — TACROLIMUS 1 MILLIGRAM(S): 0.5 CAPSULE ORAL at 20:10

## 2024-12-28 RX ADMIN — BACLOFEN 5 MILLIGRAM(S): 10 TABLET ORAL at 20:58

## 2024-12-28 RX ADMIN — ACETAMINOPHEN 650 MILLIGRAM(S): 80 SOLUTION/ DROPS ORAL at 12:27

## 2024-12-28 RX ADMIN — Medication 12 UNIT(S): at 17:28

## 2024-12-28 RX ADMIN — INSULIN GLARGINE-YFGN 40 UNIT(S): 100 INJECTION, SOLUTION SUBCUTANEOUS at 22:21

## 2024-12-28 RX ADMIN — Medication 4: at 08:33

## 2024-12-28 RX ADMIN — Medication 17 GRAM(S): at 08:23

## 2024-12-28 RX ADMIN — SODIUM CHLORIDE 100 MILLILITER(S): 9 INJECTION, SOLUTION INTRAMUSCULAR; INTRAVENOUS; SUBCUTANEOUS at 05:49

## 2024-12-28 RX ADMIN — LEVOTHYROXINE SODIUM 88 MICROGRAM(S): 175 TABLET ORAL at 05:47

## 2024-12-28 RX ADMIN — BUPROPION HYDROCHLORIDE 300 MILLIGRAM(S): 150 TABLET, EXTENDED RELEASE ORAL at 12:25

## 2024-12-28 RX ADMIN — LISINOPRIL 20 MILLIGRAM(S): 30 TABLET ORAL at 05:48

## 2024-12-28 RX ADMIN — Medication 5: at 12:24

## 2024-12-28 RX ADMIN — RITUXIMAB 743 MILLIGRAM(S): 10 INJECTION, SOLUTION INTRAVENOUS at 13:50

## 2024-12-28 RX ADMIN — CARVEDILOL 12.5 MILLIGRAM(S): 25 TABLET, FILM COATED ORAL at 08:29

## 2024-12-28 RX ADMIN — Medication 50 MILLIGRAM(S): at 12:28

## 2024-12-28 RX ADMIN — Medication 310 INTERNATIONAL UNIT(S): at 14:59

## 2024-12-28 RX ADMIN — ESCITALOPRAM OXALATE 10 MILLIGRAM(S): 10 TABLET ORAL at 12:25

## 2024-12-28 RX ADMIN — HEPARIN SODIUM 5000 UNIT(S): 1000 INJECTION, SOLUTION INTRAVENOUS; SUBCUTANEOUS at 17:28

## 2024-12-28 RX ADMIN — HYDROCORTISONE 100 MILLIGRAM(S): 100 ENEMA RECTAL at 12:28

## 2024-12-28 RX ADMIN — HEPARIN SODIUM 5000 UNIT(S): 1000 INJECTION, SOLUTION INTRAVENOUS; SUBCUTANEOUS at 05:46

## 2024-12-28 RX ADMIN — ALLOPURINOL 100 MILLIGRAM(S): 100 TABLET ORAL at 12:25

## 2024-12-28 RX ADMIN — Medication 4: at 22:14

## 2024-12-28 RX ADMIN — SODIUM CHLORIDE 100 MILLILITER(S): 9 INJECTION, SOLUTION INTRAMUSCULAR; INTRAVENOUS; SUBCUTANEOUS at 20:10

## 2024-12-28 RX ADMIN — TACROLIMUS 2 MILLIGRAM(S): 0.5 CAPSULE ORAL at 08:23

## 2024-12-28 RX ADMIN — Medication 20 MILLIGRAM(S): at 05:48

## 2024-12-28 RX ADMIN — CARVEDILOL 12.5 MILLIGRAM(S): 25 TABLET, FILM COATED ORAL at 17:29

## 2024-12-28 RX ADMIN — Medication 12: at 17:29

## 2024-12-28 RX ADMIN — Medication 10 MILLIGRAM(S): at 05:48

## 2024-12-28 NOTE — PROGRESS NOTE ADULT - PROBLEM SELECTOR PLAN 2
Afebrile. Not neutropenic  Completed Ertapenem for ESBL coli Not neutropenic, afebrile   Completed Ertapenem for ESBL E coli urosepsis OSH  pan cx if fever Not neutropenic, afebrile   Completed Ertapenem for ESBL E coli urosepsis OSH  12/28 CXR (-)  pan cx if fever

## 2024-12-28 NOTE — PROVIDER CONTACT NOTE (OTHER) - RECOMMENDATIONS
bedtime sliding scale
give 6am norvasc and lisinopril as ordered, hold or reschedule 6am coreg
n/a

## 2024-12-28 NOTE — PROGRESS NOTE ADULT - PROBLEM SELECTOR PLAN 1
Post transplant lymphoproliferative disorder  5cm liver mass--Prior known, s/p liver bx at [facility] showed 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 also noted  14cm splenomegaly since 2018  repeat MRI abdomen 12/2024: 6.6cm liver mass, 17.7cm splenomegaly, partially visualized  retroperitoneal soft tissue encasing aorta and IVC  - recommend start Allopurinol 100 mg daily   - recommend IVF: NS at 100 cc/hr if evidence of TLS or not tolerating PO   - Please order: G6PD, hepatitis B serologies (including core antibody total) and hepatitis C, HIV  - Check: TLS labs every 8 hours (CMP, Phos, LDH, uric acid)  - give rasburicase 3mg IV for uric acid > 8.0  - Transfuse if Hgb < 7.0. Post transplant lymphoproliferative disorder  5cm liver mass--Prior known, s/p liver bx at [facility] showed 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 also noted  14cm splenomegaly since 2018  repeat MRI abdomen 12/2024: 6.6cm liver mass, 17.7cm splenomegaly, partially visualized  retroperitoneal soft tissue encasing aorta and IVC  Continue  Allopurinol 100 mg daily   Continue  IVF: NS at 100 cc/hr if evidence of TLS or not tolerating PO   HIV/acute hepatitis panel(-), MUGA EF 72%, FU G6PD level  Transfuse if Hgb < 7.0 or PLT <10, <15 if fever, <20 if bleeding, TLS labs BID keep K4, Mg2  12/28 s/p PICC, FU CXR  Hypomagnesemia: Mg 1 g ivpb x1; hypercalcemia, corrected Ca 12, Calcitonin 310 mg IM q12h x2 doses Post transplant lymphoproliferative disorder  5cm liver mass--Prior known, s/p liver bx at [facility] showed 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 also noted  14cm splenomegaly since 2018  repeat MRI abdomen 12/2024: 6.6cm liver mass, 17.7cm splenomegaly, partially visualized  retroperitoneal soft tissue encasing aorta and IVC  Continue  Allopurinol 100 mg daily   Continue  IVF: NS at 100 cc/hr if evidence of TLS or not tolerating PO   HIV/acute hepatitis panel(-), MUGA EF 72%, FU G6PD level  Transfuse if Hgb < 7.0 or PLT <10, <15 if fever, <20 if bleeding, TLS labs BID keep K4, Mg2  12/28 s/p PICC, CXR confimed placement   Hypomagnesemia: Mg 1 g ivpb x1; hypercalcemia, corrected Ca 12, Calcitonin 310 mg IM q12h x2 doses

## 2024-12-28 NOTE — PROGRESS NOTE ADULT - NS ATTEND AMEND GEN_ALL_CORE FT
68yo M w/ h/o renal transplant on tacro, now with newly diagnosed DLBCL. Admitted for initiation of treatment w/ mini R-CHOP. Today is C1 D1.    Hx: renal transplant in 2012 2/2 DM, s/p left nephrectomy, peripheral neuropathy, HTN, HLD, ANGELIKA    Heme:  Rituxan today 12/28  mini CHOP on 12/29 - will likely need PICC as difficult access  f/u hepatitis panel, HIV  MUGA EF 72%  Cont allopurinol, IVF  TLS labs q12  Hypercalcemia - calcitonin    Renal:  Cont tacrolimus and prednisone  transplant nephrology consulted     ID: not neutropenic

## 2024-12-28 NOTE — PROGRESS NOTE ADULT - PROBLEM SELECTOR PLAN 5
on insulin glargine and lispro  monitor FS on insulin glargine   35 U QHS and lispro 12 U QAC  12/28 currently on Lantus 10, no AC insulin, SSI low scale   - low 500, will increased lantus to 35, 12 U QAC, moderate QAC and HS SSI  12/28 endo consult requested

## 2024-12-28 NOTE — PROGRESS NOTE ADULT - ASSESSMENT
68 y/o M who was found to have hypercalcemia and liver masses at [facility] while undergoing treatment for osteomyelitis is now admitted for management of DLBCL (PTLD) 69-year-old male with PMHx of DM, HTN, HLD, ESRD on HD s/p LKRT 2012 (from brother), hypothyroidism, recent admission to [facility] 11/30/24-12/18/24 for AMS found to have sacral OM and E. Coli bacteremia s/p treatment with meropenam till 12/10, complicated by hypercalcemia  (s/p calcitonin, aredia, and started on prednisone by nephrology), found to have new DLBCL on liver biopsy 12/16/24 transferred to [facility] from rehab to start chemo with   Mini R-CHOP. Pt has anemia and leukopenia from disease.

## 2024-12-28 NOTE — PROGRESS NOTE ADULT - PROBLEM SELECTOR PLAN 6
on azathioprine 50 mg daily, prednisone 20 mg daily,   Renal transplant consult, given intense immunosuppression with chemotherapy, consider holding azathioprine and tacro s/p LKRT 2012 and L native nephrectomy.   Per Hillary ZEPEDA/[facility], pt. baseline SCR ~1.0. Pt. SCr is at baseline.   - Obtain U/A  - Monitor labs and I/Os. Avoid nephrotoxins including, ACE/ARB, NSAIDs, contrast, etc. Dose medications as per eGFR.   2. Immunosuppression  Pt. is on Tacrolimus 2mg AM/1mg PM, imuran 50mg BID, and prednisone 20mg for hypercalcemia.   - Continue tacrolimus and prednisone   - Hold imuran in setting of recent infection and chemotherapy.   3. Hypercalcemia  Calcium on admission to [facility] 11.8. Likely related to new DLBCL diagnosis.   - Recommend Vitamin D 1,25 and 25-OH, PTH, ionized Ca  - Recommend maintenance  IVFs for now  - Recommend getting results from [facility] regarding dates of pamidronate and calcitonin treatment. s/p LKRT 2012 and L native nephrectomy.   Per Hillary ZEPEDA/[facility], pt. baseline SCR ~1.0. Pt. SCr is at baseline.   - Obtain U/A  - Monitor labs and I/Os. Avoid nephrotoxins including, ACE/ARB, NSAIDs, contrast, etc. Dose medications as per eGFR.   2. Immunosuppression  Pt. is on Tacrolimus 2mg AM/1mg PM, imuran 50mg BID, and prednisone 20mg for hypercalcemia.   - Continue tacrolimus and prednisone   - Hold imuran in setting of recent infection and chemotherapy.   3. Hypercalcemia  Calcium on admission to [facility] 11.8. Likely related to new DLBCL diagnosis.   - Recommend Vitamin D 1,25 and 25-OH, PTH, ionized Ca  - Recommend maintenance  IVFs for now  - Recommend getting results from [facility] regarding dates of pamidronate and calcitonin treatment.  12/28 tacro level 8.1, keep same dose, FU repeat in am, d/w Dr Key renal transplant

## 2024-12-28 NOTE — PROGRESS NOTE ADULT - SUBJECTIVE AND OBJECTIVE BOX
Diagnosis: DLBCL    Protocol/Chemo Regimen: mini R CHOP, Rituximab on day1, CHOP to start on day2  Day: 1     Pt endorsed: no complaint    Review of Systems: Patient denied nausea, vomiting, mouth pain,  chest pain, cough, dyspnea, abdominal pain, constipation, diarrhea, rectal pain,  rash, fatigue, headache, bleeding      Pain scale:       denies                                 Location: NA    Diet:  consistent carbo no snack    Allergies:     No Known Allergies      HEME/ONC MEDICATIONS  heparin   Injectable 5000 Unit(s) SubCutaneous every 12 hours  riTUXimab-pvvr (RUXIENCE) IVPB (eMAR) 743 milliGRAM(s) IV Intermittent once      STANDING MEDICATIONS  allopurinol 100 milliGRAM(s) Oral daily  amLODIPine   Tablet 10 milliGRAM(s) Oral daily  buPROPion XL (24-Hour) . 300 milliGRAM(s) Oral daily  calcitonin Injectable 310 International Unit(s) IntraMuscular every 12 hours  carvedilol 12.5 milliGRAM(s) Oral every 12 hours  dextrose 5%. 1000 milliLiter(s) IV Continuous <Continuous>  dextrose 5%. 1000 milliLiter(s) IV Continuous <Continuous>  dextrose 5%. 1000 milliLiter(s) IV Continuous <Continuous>  dextrose 50% Injectable 25 Gram(s) IV Push once  dextrose 50% Injectable 12.5 Gram(s) IV Push once  dextrose 50% Injectable 25 Gram(s) IV Push once  dextrose 50% Injectable 25 Gram(s) IV Push once  escitalopram 10 milliGRAM(s) Oral daily  glucagon  Injectable 1 milliGRAM(s) IntraMuscular once  insulin glargine Injectable (LANTUS) 10 Unit(s) SubCutaneous at bedtime  insulin lispro (ADMELOG) corrective regimen sliding scale   SubCutaneous three times a day before meals  insulin lispro (ADMELOG) corrective regimen sliding scale   SubCutaneous at bedtime  levothyroxine 88 MICROGram(s) Oral daily  lisinopril 20 milliGRAM(s) Oral daily  sodium chloride 0.9%. 1000 milliLiter(s) IV Continuous <Continuous>  tacrolimus 1 milliGRAM(s) Oral <User Schedule>  tacrolimus 2 milliGRAM(s) Oral <User Schedule>      PRN MEDICATIONS  acetaminophen     Tablet .. 650 milliGRAM(s) Oral every 6 hours PRN  dextrose Oral Gel 15 Gram(s) Oral once PRN  polyethylene glycol 3350 17 Gram(s) Oral two times a day PRN      Vital Signs Last 24 Hrs  T(C): 36.7 (28 Dec 2024 09:45), Max: 36.7 (27 Dec 2024 17:35)  T(F): 98 (28 Dec 2024 09:45), Max: 98.1 (27 Dec 2024 17:35)  HR: 56 (28 Dec 2024 09:45) (56 - 63)  BP: 165/72 (28 Dec 2024 09:45) (154/71 - 188/83)  BP(mean): --  RR: 18 (28 Dec 2024 09:45) (17 - 19)  SpO2: 97% (28 Dec 2024 09:45) (94% - 100%)    Parameters below as of 28 Dec 2024 09:45  Patient On (Oxygen Delivery Method): room air      PHYSICAL EXAM  General: adult in NAD  HEENT: clear oropharynx, anicteric sclera  Neck: supple  CV: normal S1/S2 RRR  Lungs: positive air movement b/l ant lungs,clear to auscultation, no wheezes, no rales  Abdomen: soft, + BS,  non-tender non-distended, no hepatosplenomegaly  Ext: no edema  Skin: no rash  Neuro: alert and oriented X 3, no focal deficits  Central Line:  PICC CDI    LABS:                           7.9    3.98  )-----------( 163      ( 28 Dec 2024 10:06 )             24.0         Mean Cell Volume : 91.3 fl  Mean Cell Hemoglobin : 30.0 pg  Mean Cell Hemoglobin Concentration : 32.9 g/dL  Auto Neutrophil # : 3.72 K/uL  Auto Lymphocyte # : 0.10 K/uL  Auto Monocyte # : 0.14 K/uL  Auto Eosinophil # : 0.00 K/uL  Auto Basophil # : 0.00 K/uL  Auto Neutrophil % : 93.5 %  Auto Lymphocyte % : 2.5 %  Auto Monocyte % : 3.5 %  Auto Eosinophil % : 0.0 %  Auto Basophil % : 0.0 %      12-28    132[L]  |  97  |  29[H]  ----------------------------<  377[H]  4.3   |  25  |  0.73    Ca    11.0[H]      28 Dec 2024 10:06  Phos  3.1     12-28  Mg     1.7     12-28    TPro  6.4  /  Alb  2.8[L]  /  TBili  0.3  /  DBili  x   /  AST  24  /  ALT  14  /  AlkPhos  128[H]  12-28        PT/INR - ( 27 Dec 2024 13:44 )   PT: 11.5 sec;   INR: 1.00 ratio         PTT - ( 27 Dec 2024 13:44 )  PTT:26.1 sec      Uric Acid 5.7        RADIOLOGY & ADDITIONAL STUDIES:         Diagnosis: DLBCL    Protocol/Chemo Regimen: mini R CHOP, Rituximab on day1, CHOP to start on day2  Day: 1     Pt endorsed: no complaint    Review of Systems: Patient denied nausea, vomiting, mouth pain,  chest pain, cough, dyspnea, abdominal pain, constipation, diarrhea, rectal pain,  rash, fatigue, headache, bleeding      Pain scale:       denies                                 Location: NA    Diet:  consistent carbo no snack    Allergies:     No Known Allergies      HEME/ONC MEDICATIONS  heparin   Injectable 5000 Unit(s) SubCutaneous every 12 hours  riTUXimab-pvvr (RUXIENCE) IVPB (eMAR) 743 milliGRAM(s) IV Intermittent once      STANDING MEDICATIONS  allopurinol 100 milliGRAM(s) Oral daily  amLODIPine   Tablet 10 milliGRAM(s) Oral daily  buPROPion XL (24-Hour) . 300 milliGRAM(s) Oral daily  calcitonin Injectable 310 International Unit(s) IntraMuscular every 12 hours  carvedilol 12.5 milliGRAM(s) Oral every 12 hours  dextrose 5%. 1000 milliLiter(s) IV Continuous <Continuous>  dextrose 5%. 1000 milliLiter(s) IV Continuous <Continuous>  dextrose 5%. 1000 milliLiter(s) IV Continuous <Continuous>  dextrose 50% Injectable 25 Gram(s) IV Push once  dextrose 50% Injectable 12.5 Gram(s) IV Push once  dextrose 50% Injectable 25 Gram(s) IV Push once  dextrose 50% Injectable 25 Gram(s) IV Push once  escitalopram 10 milliGRAM(s) Oral daily  glucagon  Injectable 1 milliGRAM(s) IntraMuscular once  insulin glargine Injectable (LANTUS) 10 Unit(s) SubCutaneous at bedtime  insulin lispro (ADMELOG) corrective regimen sliding scale   SubCutaneous three times a day before meals  insulin lispro (ADMELOG) corrective regimen sliding scale   SubCutaneous at bedtime  levothyroxine 88 MICROGram(s) Oral daily  lisinopril 20 milliGRAM(s) Oral daily  sodium chloride 0.9%. 1000 milliLiter(s) IV Continuous <Continuous>  tacrolimus 1 milliGRAM(s) Oral <User Schedule>  tacrolimus 2 milliGRAM(s) Oral <User Schedule>      PRN MEDICATIONS  acetaminophen     Tablet .. 650 milliGRAM(s) Oral every 6 hours PRN  dextrose Oral Gel 15 Gram(s) Oral once PRN  polyethylene glycol 3350 17 Gram(s) Oral two times a day PRN      Vital Signs Last 24 Hrs  T(C): 36.7 (28 Dec 2024 09:45), Max: 36.7 (27 Dec 2024 17:35)  T(F): 98 (28 Dec 2024 09:45), Max: 98.1 (27 Dec 2024 17:35)  HR: 56 (28 Dec 2024 09:45) (56 - 63)  BP: 165/72 (28 Dec 2024 09:45) (154/71 - 188/83)  BP(mean): --  RR: 18 (28 Dec 2024 09:45) (17 - 19)  SpO2: 97% (28 Dec 2024 09:45) (94% - 100%)    Parameters below as of 28 Dec 2024 09:45  Patient On (Oxygen Delivery Method): room air      PHYSICAL EXAM  General: adult in NAD  HEENT: clear oropharynx, anicteric sclera  Neck: supple  CV: normal S1/S2 RRR  Lungs: positive air movement b/l ant lungs,clear to auscultation, no wheezes, no rales  Abdomen: soft, + BS,  non-tender non-distended, no hepatosplenomegaly  Ext: no edema  Skin: no rash  Neuro: alert and oriented X 3, no focal deficits  Central Line:  PICC CDI    LABS:                           7.9    3.98  )-----------( 163      ( 28 Dec 2024 10:06 )             24.0         Mean Cell Volume : 91.3 fl  Mean Cell Hemoglobin : 30.0 pg  Mean Cell Hemoglobin Concentration : 32.9 g/dL  Auto Neutrophil # : 3.72 K/uL  Auto Lymphocyte # : 0.10 K/uL  Auto Monocyte # : 0.14 K/uL  Auto Eosinophil # : 0.00 K/uL  Auto Basophil # : 0.00 K/uL  Auto Neutrophil % : 93.5 %  Auto Lymphocyte % : 2.5 %  Auto Monocyte % : 3.5 %  Auto Eosinophil % : 0.0 %  Auto Basophil % : 0.0 %      12-28    132[L]  |  97  |  29[H]  ----------------------------<  377[H]  4.3   |  25  |  0.73    Ca    11.0[H]      28 Dec 2024 10:06  Phos  3.1     12-28  Mg     1.7     12-28    TPro  6.4  /  Alb  2.8[L]  /  TBili  0.3  /  DBili  x   /  AST  24  /  ALT  14  /  AlkPhos  128[H]  12-28        PT/INR - ( 27 Dec 2024 13:44 )   PT: 11.5 sec;   INR: 1.00 ratio         PTT - ( 27 Dec 2024 13:44 )  PTT:26.1 sec      Uric Acid 5.7        RADIOLOGY & ADDITIONAL STUDIES:    < from: Xray Chest 1 View- PORTABLE-Urgent (Xray Chest 1 View- PORTABLE-Urgent .) (12.28.24 @ 14:48) >  FINDINGS:  Left-sided PICC line with tip in the SVC.  No focal consolidation.  There is no pneumothorax or pleural effusion.  No acute osseous abnormalities. Chronic left-sided rib fractures. Chronic   left humeral neck fracture.    IMPRESSION:  No focal consolidation.

## 2024-12-28 NOTE — ADVANCED PRACTICE NURSE CONSULT - ASSESSMENT
pt assessed - AOx2- NAD-pt denies SOB,Chest pain and has no signs of active bleeding. provided patient and family with verbal explanation. Verbalized understanding.  Labs reviewed on rounds by Dr. Newell. Orders verified via 2 RN method. Hepatitis Panel done 12/27- Neg. Hep B antibody to be done tonight as per NP Meg ji to proceed with treatment.  pt premedicated w tylenol 650 mg po, Benadryl 50mg IVP, Hydrocortisone 100mg IVP. Left 20 G patent,  assessed- site C/D/I, + blood return. At 13:50 pt started riTUXimab-pvvr (RUXIENCE) IVPB (eMAR) [Ordered as RUXIENCE IVPB (eMAR)] - Start Date: 28-Dec-2024  743 milliGRAM(s) in sodium chloride 0.9% 668.7 milliLiter(s), IV Intermittent, once, infuse over 4 Hour(s), Stop After 1 Doses  Administration Instructions: Start an infusion rate of 50 mG/hour; if there is no infusion-related reaction, increase the rate by 50 mG/hour increments every 30 minutes, to a maximum rate of 400 mG/hour. Connected  lowest port of NS line, through alaris pump, into Left 20 G PIV started at a rate of 50 ml/h as ordered,  Rate titrated 50 cc [id number] w/ VS to max rate of 400cc. pt tolerating infusion well-  pt safety maintained, call bell within reach.   pt assessed - AOx2- NAD-pt denies SOB,Chest pain and has no signs of active bleeding. provided patient and family with verbal explanation. Verbalized understanding.  Labs reviewed on rounds by Dr. Newell. Orders verified via 2 RN method. Hepatitis Panel done 12/27- Neg. Hep B antibody to be done tonight as per NP Meg ji to proceed with treatment.  Pt premedicated w tylenol 650 mg po, Benadryl 50mg IVP, Hydrocortisone 100mg IVP. Left 20 G patent,  assessed- site C/D/I, + blood return. At 13:50 pt started riTUXimab-pvvr (RUXIENCE) IVPB (eMAR) [Ordered as RUXIENCE IVPB (eMAR)] - Start Date: 28-Dec-2024  743 milliGRAM(s) in sodium chloride 0.9% 668.7 milliLiter(s), IV Intermittent, once, infuse over 4 Hour(s), Stop After 1 Doses  Administration Instructions: Start an infusion rate of 50 mG/hour; if there is no infusion-related reaction, increase the rate by 50 mG/hour increments every 30 minutes, to a maximum rate of 400 mG/hour. Connected  lowest port of NS line, through alaris pump, into Left 20 G PIV started at a rate of 50 ml/h as ordered,  Rate titrated 50 cc [id number] w/ VS done as per protocol, to max rate of 400cc. pt tolerating infusion well-  pt safety maintained, call bell within reach.

## 2024-12-28 NOTE — CHART NOTE - NSCHARTNOTEFT_GEN_A_CORE
JAN LOPEZ  [id number]  [facility] 7MON 703 D1    69-year-old male with PMHx of DM, HTN, HLD, ESRD on HD s/p LKRT 2012 (from brother), hypothyroidism, recent admission to [facility] 11/30/24-12/18/24 for AMS found to have sacral OM and E. Coli bacteremia s/p treatment with meropenem thru 12/10, complicated by hypercalcemia  (s/p calcitonin, aredia, and started on prednisone by nephrology), found to have new DLBCL on liver biopsy 12/16/24 transferred to [facility] from rehab to start chemotherapy with R-mini CHOP.  Plan for Prednisone 90mg x 5 days.   After [facility] admission, patient had rehab stay at [facility] in [place] 12/18 thru transfer to [facility] 12/27, on insulin glargine 35u qhs and insulin lispro 12 units TID, on Prednisone 20mg daily for immunosuppression for kidney transplant.    Hyperglycemic here on lower insulin doses.    PLAN:  - Recommend Lantus 40 units QHS   - Recommend Admelog 15 units TID before meals (HOLD if NPO or not eating)  - Recommend moderate dose Admelog correction scale TID before meals and separate moderate dose scale QHS  - Please check FSG before meals and QHS, or q6h while NPO  - Inpatient glucose goals: 100-180  - consistent carb diet when eating     Full consult to follow in the AM.    Ford Holm MD  Endocrine Fellow  For nonurgent matters, please email [email or web address] or [email or web address]. For urgent matters only, please call answering service at [phone] (weekdays), [phone] (nights/weekends). JAN LOPEZ  [id number]  [facility] 7MON 703 D1    69-year-old male with PMHx of DM, HTN, HLD, ESRD on HD s/p LKRT 2012 (from brother), hypothyroidism, recent admission to [facility] 11/30/24-12/18/24 for AMS found to have sacral OM and E. Coli bacteremia s/p treatment with meropenem thru 12/10, complicated by hypercalcemia  (s/p calcitonin, aredia, and started on prednisone by nephrology), found to have new DLBCL on liver biopsy 12/16/24 transferred to [facility] from rehab to start chemotherapy with R-mini CHOP.  Plan for Prednisone 90mg x 5 days.   After [facility] admission, patient had rehab stay at [facility] in [place] 12/18 thru transfer to [facility] 12/27, on insulin glargine 35u qhs and insulin lispro 12 units TID, on Prednisone 20mg daily for immunosuppression for kidney transplant.    Hyperglycemic up to 500s since transfer here due to lower insulin doses and higher prednisone doses.    PLAN:  - Recommend Lantus 40 units QHS   - Recommend Admelog 15 units TID before meals (HOLD if NPO or not eating)  - Recommend moderate dose Admelog correction scale TID before meals and separate moderate dose scale QHS  - Please check FSG before meals and QHS, or q6h while NPO  - Inpatient glucose goals: 100-180  - consistent carb diet when eating     Full consult to follow in the AM.    Ford Holm MD  Endocrine Fellow  For nonurgent matters, please email [email or web address] or [email or web address]. For urgent matters only, please call answering service at [phone] (weekdays), [phone] (nights/weekends).

## 2024-12-29 DIAGNOSIS — E83.52 HYPERCALCEMIA: ICD-10-CM

## 2024-12-29 DIAGNOSIS — E11.65 TYPE 2 DIABETES MELLITUS WITH HYPERGLYCEMIA: ICD-10-CM

## 2024-12-29 DIAGNOSIS — R73.9 HYPERGLYCEMIA, UNSPECIFIED: ICD-10-CM

## 2024-12-29 DIAGNOSIS — E78.5 HYPERLIPIDEMIA, UNSPECIFIED: ICD-10-CM

## 2024-12-29 LAB
A1C WITH ESTIMATED AVERAGE GLUCOSE RESULT: 7.8 % — HIGH (ref 4–5.6)
ALBUMIN SERPL ELPH-MCNC: 2.6 G/DL — LOW (ref 3.3–5)
ALBUMIN SERPL ELPH-MCNC: 2.8 G/DL — LOW (ref 3.3–5)
ALP SERPL-CCNC: 115 U/L — SIGNIFICANT CHANGE UP (ref 40–120)
ALP SERPL-CCNC: 121 U/L — HIGH (ref 40–120)
ALT FLD-CCNC: 15 U/L — SIGNIFICANT CHANGE UP (ref 10–45)
ALT FLD-CCNC: 15 U/L — SIGNIFICANT CHANGE UP (ref 10–45)
ANION GAP SERPL CALC-SCNC: 10 MMOL/L — SIGNIFICANT CHANGE UP (ref 5–17)
ANION GAP SERPL CALC-SCNC: 8 MMOL/L — SIGNIFICANT CHANGE UP (ref 5–17)
APPEARANCE UR: CLEAR — SIGNIFICANT CHANGE UP
APTT BLD: 28.2 SEC — SIGNIFICANT CHANGE UP (ref 24.5–35.6)
AST SERPL-CCNC: 21 U/L — SIGNIFICANT CHANGE UP (ref 10–40)
AST SERPL-CCNC: 27 U/L — SIGNIFICANT CHANGE UP (ref 10–40)
BACTERIA # UR AUTO: NEGATIVE /HPF — SIGNIFICANT CHANGE UP
BASOPHILS # BLD AUTO: 0 K/UL — SIGNIFICANT CHANGE UP (ref 0–0.2)
BASOPHILS NFR BLD AUTO: 0 % — SIGNIFICANT CHANGE UP (ref 0–2)
BILIRUB SERPL-MCNC: 0.3 MG/DL — SIGNIFICANT CHANGE UP (ref 0.2–1.2)
BILIRUB SERPL-MCNC: 0.4 MG/DL — SIGNIFICANT CHANGE UP (ref 0.2–1.2)
BILIRUB UR-MCNC: NEGATIVE — SIGNIFICANT CHANGE UP
BUN SERPL-MCNC: 17 MG/DL — SIGNIFICANT CHANGE UP (ref 7–23)
BUN SERPL-MCNC: 23 MG/DL — SIGNIFICANT CHANGE UP (ref 7–23)
CA-I BLD-SCNC: 1.48 MMOL/L — HIGH (ref 1.15–1.33)
CALCIUM SERPL-MCNC: 10.1 MG/DL — SIGNIFICANT CHANGE UP (ref 8.4–10.5)
CALCIUM SERPL-MCNC: 10.5 MG/DL — SIGNIFICANT CHANGE UP (ref 8.4–10.5)
CAST: 3 /LPF — SIGNIFICANT CHANGE UP (ref 0–4)
CHLORIDE SERPL-SCNC: 101 MMOL/L — SIGNIFICANT CHANGE UP (ref 96–108)
CHLORIDE SERPL-SCNC: 103 MMOL/L — SIGNIFICANT CHANGE UP (ref 96–108)
CO2 SERPL-SCNC: 28 MMOL/L — SIGNIFICANT CHANGE UP (ref 22–31)
CO2 SERPL-SCNC: 28 MMOL/L — SIGNIFICANT CHANGE UP (ref 22–31)
COLOR SPEC: YELLOW — SIGNIFICANT CHANGE UP
CREAT SERPL-MCNC: 0.67 MG/DL — SIGNIFICANT CHANGE UP (ref 0.5–1.3)
CREAT SERPL-MCNC: 0.82 MG/DL — SIGNIFICANT CHANGE UP (ref 0.5–1.3)
CYSTATIN C SERPL-MCNC: 1.63 MG/L — HIGH (ref 0.77–1.42)
D DIMER BLD IA.RAPID-MCNC: 2066 NG/ML DDU — HIGH
DIFF PNL FLD: NEGATIVE — SIGNIFICANT CHANGE UP
EGFR: 102 ML/MIN/1.73M2 — SIGNIFICANT CHANGE UP
EGFR: 96 ML/MIN/1.73M2 — SIGNIFICANT CHANGE UP
EOSINOPHIL # BLD AUTO: 0.03 K/UL — SIGNIFICANT CHANGE UP (ref 0–0.5)
EOSINOPHIL NFR BLD AUTO: 0.7 % — SIGNIFICANT CHANGE UP (ref 0–6)
ESTIMATED AVERAGE GLUCOSE: 177 MG/DL — HIGH (ref 68–114)
FIBRINOGEN PPP-MCNC: 297 MG/DL — SIGNIFICANT CHANGE UP (ref 200–445)
GFR/BSA.PRED SERPLBLD CYS-BASED-ARV: 40 ML/MIN/1.73M2 — LOW
GLUCOSE BLDC GLUCOMTR-MCNC: 106 MG/DL — HIGH (ref 70–99)
GLUCOSE BLDC GLUCOMTR-MCNC: 110 MG/DL — HIGH (ref 70–99)
GLUCOSE BLDC GLUCOMTR-MCNC: 169 MG/DL — HIGH (ref 70–99)
GLUCOSE BLDC GLUCOMTR-MCNC: 46 MG/DL — CRITICAL LOW (ref 70–99)
GLUCOSE BLDC GLUCOMTR-MCNC: 66 MG/DL — LOW (ref 70–99)
GLUCOSE BLDC GLUCOMTR-MCNC: 76 MG/DL — SIGNIFICANT CHANGE UP (ref 70–99)
GLUCOSE BLDC GLUCOMTR-MCNC: 88 MG/DL — SIGNIFICANT CHANGE UP (ref 70–99)
GLUCOSE BLDC GLUCOMTR-MCNC: 94 MG/DL — SIGNIFICANT CHANGE UP (ref 70–99)
GLUCOSE SERPL-MCNC: 109 MG/DL — HIGH (ref 70–99)
GLUCOSE SERPL-MCNC: 185 MG/DL — HIGH (ref 70–99)
GLUCOSE UR QL: >=1000 MG/DL
HBV CORE AB SER-ACNC: SIGNIFICANT CHANGE UP
HBV SURFACE AB SER-ACNC: REACTIVE — SIGNIFICANT CHANGE UP
HCT VFR BLD CALC: 23.7 % — LOW (ref 39–50)
HGB BLD-MCNC: 7.7 G/DL — LOW (ref 13–17)
IMM GRANULOCYTES NFR BLD AUTO: 1 % — HIGH (ref 0–0.9)
INR BLD: 1.02 RATIO — SIGNIFICANT CHANGE UP (ref 0.85–1.16)
KETONES UR-MCNC: NEGATIVE MG/DL — SIGNIFICANT CHANGE UP
LACTATE SERPL-SCNC: 2.1 MMOL/L — HIGH (ref 0.5–2)
LACTATE SERPL-SCNC: 2.1 MMOL/L — HIGH (ref 0.5–2)
LDH SERPL L TO P-CCNC: 379 U/L — HIGH (ref 50–242)
LDH SERPL L TO P-CCNC: 392 U/L — HIGH (ref 50–242)
LEUKOCYTE ESTERASE UR-ACNC: ABNORMAL
LYMPHOCYTES # BLD AUTO: 0.27 K/UL — LOW (ref 1–3.3)
LYMPHOCYTES # BLD AUTO: 6.6 % — LOW (ref 13–44)
MAGNESIUM SERPL-MCNC: 1.4 MG/DL — LOW (ref 1.6–2.6)
MAGNESIUM SERPL-MCNC: 1.6 MG/DL — SIGNIFICANT CHANGE UP (ref 1.6–2.6)
MCHC RBC-ENTMCNC: 30 PG — SIGNIFICANT CHANGE UP (ref 27–34)
MCHC RBC-ENTMCNC: 32.5 G/DL — SIGNIFICANT CHANGE UP (ref 32–36)
MCV RBC AUTO: 92.2 FL — SIGNIFICANT CHANGE UP (ref 80–100)
MONOCYTES # BLD AUTO: 0.13 K/UL — SIGNIFICANT CHANGE UP (ref 0–0.9)
MONOCYTES NFR BLD AUTO: 3.2 % — SIGNIFICANT CHANGE UP (ref 2–14)
NEUTROPHILS # BLD AUTO: 3.65 K/UL — SIGNIFICANT CHANGE UP (ref 1.8–7.4)
NEUTROPHILS NFR BLD AUTO: 88.5 % — HIGH (ref 43–77)
NITRITE UR-MCNC: NEGATIVE — SIGNIFICANT CHANGE UP
NRBC # BLD: 0 /100 WBCS — SIGNIFICANT CHANGE UP (ref 0–0)
PH UR: 5.5 — SIGNIFICANT CHANGE UP (ref 5–8)
PHOSPHATE SERPL-MCNC: 2.2 MG/DL — LOW (ref 2.5–4.5)
PHOSPHATE SERPL-MCNC: 2.8 MG/DL — SIGNIFICANT CHANGE UP (ref 2.5–4.5)
PLATELET # BLD AUTO: 207 K/UL — SIGNIFICANT CHANGE UP (ref 150–400)
POTASSIUM SERPL-MCNC: 3.7 MMOL/L — SIGNIFICANT CHANGE UP (ref 3.5–5.3)
POTASSIUM SERPL-MCNC: 4.8 MMOL/L — SIGNIFICANT CHANGE UP (ref 3.5–5.3)
POTASSIUM SERPL-SCNC: 3.7 MMOL/L — SIGNIFICANT CHANGE UP (ref 3.5–5.3)
POTASSIUM SERPL-SCNC: 4.8 MMOL/L — SIGNIFICANT CHANGE UP (ref 3.5–5.3)
PROT SERPL-MCNC: 6 G/DL — SIGNIFICANT CHANGE UP (ref 6–8.3)
PROT SERPL-MCNC: 6.3 G/DL — SIGNIFICANT CHANGE UP (ref 6–8.3)
PROT UR-MCNC: NEGATIVE MG/DL — SIGNIFICANT CHANGE UP
PROTHROM AB SERPL-ACNC: 11.7 SEC — SIGNIFICANT CHANGE UP (ref 9.9–13.4)
RBC # BLD: 2.57 M/UL — LOW (ref 4.2–5.8)
RBC # FLD: 18.6 % — HIGH (ref 10.3–14.5)
RBC CASTS # UR COMP ASSIST: 4 /HPF — SIGNIFICANT CHANGE UP (ref 0–4)
REVIEW: SIGNIFICANT CHANGE UP
SODIUM SERPL-SCNC: 137 MMOL/L — SIGNIFICANT CHANGE UP (ref 135–145)
SODIUM SERPL-SCNC: 141 MMOL/L — SIGNIFICANT CHANGE UP (ref 135–145)
SP GR SPEC: 1.02 — SIGNIFICANT CHANGE UP (ref 1–1.03)
SQUAMOUS # UR AUTO: 1 /HPF — SIGNIFICANT CHANGE UP (ref 0–5)
TACROLIMUS SERPL-MCNC: 2.8 NG/ML — SIGNIFICANT CHANGE UP
URATE SERPL-MCNC: 3.9 MG/DL — SIGNIFICANT CHANGE UP (ref 3.4–8.8)
URATE SERPL-MCNC: 5 MG/DL — SIGNIFICANT CHANGE UP (ref 3.4–8.8)
UROBILINOGEN FLD QL: 0.2 MG/DL — SIGNIFICANT CHANGE UP (ref 0.2–1)
VIT D25+D1,25 OH+D1,25 PNL SERPL-MCNC: 101 PG/ML — HIGH (ref 19.9–79.3)
WBC # BLD: 4.12 K/UL — SIGNIFICANT CHANGE UP (ref 3.8–10.5)
WBC # FLD AUTO: 4.12 K/UL — SIGNIFICANT CHANGE UP (ref 3.8–10.5)
WBC UR QL: 30 /HPF — HIGH (ref 0–5)
YEAST-LIKE CELLS: PRESENT

## 2024-12-29 PROCEDURE — 99223 1ST HOSP IP/OBS HIGH 75: CPT

## 2024-12-29 PROCEDURE — 99233 SBSQ HOSP IP/OBS HIGH 50: CPT

## 2024-12-29 RX ORDER — POTASSIUM CHLORIDE 600 MG/1
20 TABLET, FILM COATED, EXTENDED RELEASE ORAL
Refills: 0 | Status: DISCONTINUED | OUTPATIENT
Start: 2024-12-29 | End: 2025-01-02

## 2024-12-29 RX ORDER — FOSAPREPITANT DIMEGLUMINE 150 MG/5ML
150 INJECTION, POWDER, LYOPHILIZED, FOR SOLUTION INTRAVENOUS ONCE
Refills: 0 | Status: COMPLETED | OUTPATIENT
Start: 2024-12-29 | End: 2024-12-29

## 2024-12-29 RX ORDER — HYDRALAZINE HYDROCHLORIDE 10 MG/1
50 TABLET ORAL ONCE
Refills: 0 | Status: COMPLETED | OUTPATIENT
Start: 2024-12-29 | End: 2024-12-29

## 2024-12-29 RX ORDER — PREDNISONE 5 MG
80 TABLET ORAL EVERY 24 HOURS
Refills: 0 | Status: COMPLETED | OUTPATIENT
Start: 2024-12-29 | End: 2025-01-02

## 2024-12-29 RX ORDER — INSULIN GLARGINE-YFGN 100 [IU]/ML
30 INJECTION, SOLUTION SUBCUTANEOUS AT BEDTIME
Refills: 0 | Status: DISCONTINUED | OUTPATIENT
Start: 2024-12-29 | End: 2024-12-31

## 2024-12-29 RX ORDER — DOXORUBICIN HYDROCHLORIDE 2 MG/ML
50 INJECTION, SOLUTION INTRAVENOUS ONCE
Refills: 0 | Status: COMPLETED | OUTPATIENT
Start: 2024-12-29 | End: 2024-12-29

## 2024-12-29 RX ORDER — POTASSIUM PHOSPHATE, MONOBASIC AND POTASSIUM PHOSPHATE, DIBASIC 224; 236 MG/ML; MG/ML
15 INJECTION, SOLUTION INTRAVENOUS ONCE
Refills: 0 | Status: COMPLETED | OUTPATIENT
Start: 2024-12-29 | End: 2024-12-29

## 2024-12-29 RX ORDER — TACROLIMUS 0.5 MG/1
1 CAPSULE ORAL
Qty: 0 | Refills: 0 | DISCHARGE
Start: 2024-12-29

## 2024-12-29 RX ORDER — HYDRALAZINE HYDROCHLORIDE 10 MG/1
50 TABLET ORAL EVERY 12 HOURS
Refills: 0 | Status: DISCONTINUED | OUTPATIENT
Start: 2024-12-29 | End: 2024-12-30

## 2024-12-29 RX ORDER — BACLOFEN 10 MG/1
5 TABLET ORAL EVERY 8 HOURS
Refills: 0 | Status: DISCONTINUED | OUTPATIENT
Start: 2024-12-29 | End: 2025-01-02

## 2024-12-29 RX ORDER — ACETAMINOPHEN 80 MG/.8ML
2 SOLUTION/ DROPS ORAL
Qty: 0 | Refills: 0 | DISCHARGE
Start: 2024-12-29

## 2024-12-29 RX ORDER — CYCLOPHOSPHAMIDE 500 MG/25ML
792 INJECTION, POWDER, FOR SOLUTION INTRAVENOUS; ORAL ONCE
Refills: 0 | Status: COMPLETED | OUTPATIENT
Start: 2024-12-29 | End: 2024-12-29

## 2024-12-29 RX ORDER — ONDANSETRON 4 MG/1
16 TABLET ORAL ONCE
Refills: 0 | Status: COMPLETED | OUTPATIENT
Start: 2024-12-29 | End: 2024-12-29

## 2024-12-29 RX ORDER — INSULIN LISPRO 100/ML
10 VIAL (ML) SUBCUTANEOUS
Refills: 0 | Status: DISCONTINUED | OUTPATIENT
Start: 2024-12-29 | End: 2024-12-30

## 2024-12-29 RX ORDER — MAGNESIUM SULFATE 500 MG/ML
2 INJECTION, SOLUTION INTRAMUSCULAR; INTRAVENOUS ONCE
Refills: 0 | Status: COMPLETED | OUTPATIENT
Start: 2024-12-29 | End: 2024-12-29

## 2024-12-29 RX ORDER — VINCRISTINE SULFATE 1 MG/ML
1 INJECTION, SOLUTION INTRAVENOUS ONCE
Refills: 0 | Status: COMPLETED | OUTPATIENT
Start: 2024-12-29 | End: 2024-12-29

## 2024-12-29 RX ORDER — METOCLOPRAMIDE 10 MG/1
10 TABLET ORAL EVERY 6 HOURS
Refills: 0 | Status: DISCONTINUED | OUTPATIENT
Start: 2024-12-29 | End: 2025-01-02

## 2024-12-29 RX ADMIN — Medication 80 MILLIGRAM(S): at 10:24

## 2024-12-29 RX ADMIN — HEPARIN SODIUM 5000 UNIT(S): 1000 INJECTION, SOLUTION INTRAVENOUS; SUBCUTANEOUS at 05:40

## 2024-12-29 RX ADMIN — BACLOFEN 5 MILLIGRAM(S): 10 TABLET ORAL at 21:55

## 2024-12-29 RX ADMIN — DOXORUBICIN HYDROCHLORIDE 150 MILLIGRAM(S): 2 INJECTION, SOLUTION INTRAVENOUS at 11:40

## 2024-12-29 RX ADMIN — VINCRISTINE SULFATE 1 MILLIGRAM(S): 1 INJECTION, SOLUTION INTRAVENOUS at 11:33

## 2024-12-29 RX ADMIN — ONDANSETRON 116 MILLIGRAM(S): 4 TABLET ORAL at 10:54

## 2024-12-29 RX ADMIN — ESCITALOPRAM OXALATE 10 MILLIGRAM(S): 10 TABLET ORAL at 11:22

## 2024-12-29 RX ADMIN — TACROLIMUS 1 MILLIGRAM(S): 0.5 CAPSULE ORAL at 20:40

## 2024-12-29 RX ADMIN — BUPROPION HYDROCHLORIDE 300 MILLIGRAM(S): 150 TABLET, EXTENDED RELEASE ORAL at 11:22

## 2024-12-29 RX ADMIN — HYDRALAZINE HYDROCHLORIDE 50 MILLIGRAM(S): 10 TABLET ORAL at 14:27

## 2024-12-29 RX ADMIN — LISINOPRIL 20 MILLIGRAM(S): 30 TABLET ORAL at 05:39

## 2024-12-29 RX ADMIN — POTASSIUM PHOSPHATE, MONOBASIC AND POTASSIUM PHOSPHATE, DIBASIC 62.5 MILLIMOLE(S): 224; 236 INJECTION, SOLUTION INTRAVENOUS at 09:23

## 2024-12-29 RX ADMIN — Medication 10 MILLIGRAM(S): at 05:40

## 2024-12-29 RX ADMIN — Medication 310 INTERNATIONAL UNIT(S): at 06:53

## 2024-12-29 RX ADMIN — SODIUM CHLORIDE 100 MILLILITER(S): 9 INJECTION, SOLUTION INTRAMUSCULAR; INTRAVENOUS; SUBCUTANEOUS at 06:53

## 2024-12-29 RX ADMIN — CYCLOPHOSPHAMIDE 792 MILLIGRAM(S): 500 INJECTION, POWDER, FOR SOLUTION INTRAVENOUS; ORAL at 11:50

## 2024-12-29 RX ADMIN — CARVEDILOL 12.5 MILLIGRAM(S): 25 TABLET, FILM COATED ORAL at 18:40

## 2024-12-29 RX ADMIN — HEPARIN SODIUM 5000 UNIT(S): 1000 INJECTION, SOLUTION INTRAVENOUS; SUBCUTANEOUS at 18:40

## 2024-12-29 RX ADMIN — Medication 17 GRAM(S): at 11:41

## 2024-12-29 RX ADMIN — INSULIN GLARGINE-YFGN 30 UNIT(S): 100 INJECTION, SOLUTION SUBCUTANEOUS at 21:55

## 2024-12-29 RX ADMIN — CARVEDILOL 12.5 MILLIGRAM(S): 25 TABLET, FILM COATED ORAL at 05:39

## 2024-12-29 RX ADMIN — FOSAPREPITANT DIMEGLUMINE 300 MILLIGRAM(S): 150 INJECTION, POWDER, LYOPHILIZED, FOR SOLUTION INTRAVENOUS at 10:20

## 2024-12-29 RX ADMIN — Medication 400 MILLIGRAM(S): at 09:22

## 2024-12-29 RX ADMIN — TACROLIMUS 2 MILLIGRAM(S): 0.5 CAPSULE ORAL at 09:22

## 2024-12-29 RX ADMIN — SODIUM CHLORIDE 100 MILLILITER(S): 9 INJECTION, SOLUTION INTRAMUSCULAR; INTRAVENOUS; SUBCUTANEOUS at 20:40

## 2024-12-29 RX ADMIN — CHLORHEXIDINE GLUCONATE 1 APPLICATION(S): 1.2 RINSE ORAL at 05:40

## 2024-12-29 RX ADMIN — LEVOTHYROXINE SODIUM 88 MICROGRAM(S): 175 TABLET ORAL at 05:39

## 2024-12-29 RX ADMIN — ALLOPURINOL 100 MILLIGRAM(S): 100 TABLET ORAL at 11:22

## 2024-12-29 RX ADMIN — POTASSIUM CHLORIDE 20 MILLIEQUIVALENT(S): 600 TABLET, FILM COATED, EXTENDED RELEASE ORAL at 18:40

## 2024-12-29 NOTE — DIETITIAN INITIAL EVALUATION ADULT - PROBLEM SELECTOR PROBLEM 5
Diabetes mellitus Principal Discharge DX:	Urinary tract infection without hematuria, site unspecified  Assessment and plan of treatment:	Check UA, CBC  Secondary Diagnosis:	Alcohol withdrawal syndrome without complication Principal Discharge DX:	Urinary tract infection without hematuria, site unspecified  Assessment and plan of treatment:	Check UA, CBC

## 2024-12-29 NOTE — DISCHARGE NOTE PROVIDER - DETAILS OF MALNUTRITION DIAGNOSIS/DIAGNOSES
This patient has been assessed with a concern for Malnutrition and was treated during this hospitalization for the following Nutrition diagnosis/diagnoses:     -  12/29/2024: Severe protein-calorie malnutrition

## 2024-12-29 NOTE — PROGRESS NOTE ADULT - PROBLEM SELECTOR PLAN 6
s/p LKRT 2012 and L native nephrectomy.   Per Hillary ZEPEDA/[facility], pt. baseline SCR ~1.0. Pt. SCr is at baseline.   - Obtain U/A  - Monitor labs and I/Os. Avoid nephrotoxins including, ACE/ARB, NSAIDs, contrast, etc. Dose medications as per eGFR.   2. Immunosuppression  Pt. is on Tacrolimus 2mg AM/1mg PM, imuran 50mg BID, and prednisone 20mg for hypercalcemia.   - Continue tacrolimus and prednisone   - Hold imuran in setting of recent infection and chemotherapy.   3. Hypercalcemia  Calcium on admission to [facility] 11.8. Likely related to new DLBCL diagnosis.   - Recommend Vitamin D 1,25 and 25-OH, PTH, ionized Ca  - Recommend maintenance  IVFs for now  - Recommend getting results from [facility] regarding dates of pamidronate and calcitonin treatment.  12/28 tacro level 8.1, keep same dose, FU repeat in am, d/w Dr Key renal transplant

## 2024-12-29 NOTE — DIETITIAN INITIAL EVALUATION ADULT - PERTINENT MEDS FT
MEDICATIONS  (STANDING):  allopurinol 100 milliGRAM(s) Oral daily  amLODIPine   Tablet 10 milliGRAM(s) Oral daily  buPROPion XL (24-Hour) . 300 milliGRAM(s) Oral daily  carvedilol 12.5 milliGRAM(s) Oral every 12 hours  chlorhexidine 2% Cloths 1 Application(s) Topical <User Schedule>  dextrose 5%. 1000 milliLiter(s) (50 mL/Hr) IV Continuous <Continuous>  dextrose 5%. 1000 milliLiter(s) (50 mL/Hr) IV Continuous <Continuous>  dextrose 5%. 1000 milliLiter(s) (100 mL/Hr) IV Continuous <Continuous>  dextrose 50% Injectable 25 Gram(s) IV Push once  dextrose 50% Injectable 12.5 Gram(s) IV Push once  dextrose 50% Injectable 25 Gram(s) IV Push once  dextrose 50% Injectable 25 Gram(s) IV Push once  escitalopram 10 milliGRAM(s) Oral daily  glucagon  Injectable 1 milliGRAM(s) IntraMuscular once  heparin   Injectable 5000 Unit(s) SubCutaneous every 12 hours  hydrALAZINE 50 milliGRAM(s) Oral every 12 hours  insulin glargine Injectable (LANTUS) 30 Unit(s) SubCutaneous at bedtime  insulin lispro (ADMELOG) corrective regimen sliding scale   SubCutaneous three times a day before meals  insulin lispro (ADMELOG) corrective regimen sliding scale   SubCutaneous at bedtime  insulin lispro Injectable (ADMELOG) 15 Unit(s) SubCutaneous three times a day before meals  levothyroxine 88 MICROGram(s) Oral daily  lisinopril 20 milliGRAM(s) Oral daily  potassium chloride    Tablet ER 20 milliEquivalent(s) Oral two times a day  predniSONE   Tablet 80 milliGRAM(s) Oral every 24 hours  sodium chloride 0.9%. 1000 milliLiter(s) (100 mL/Hr) IV Continuous <Continuous>  tacrolimus 1 milliGRAM(s) Oral <User Schedule>  tacrolimus 2 milliGRAM(s) Oral <User Schedule>    MEDICATIONS  (PRN):  acetaminophen     Tablet .. 650 milliGRAM(s) Oral every 6 hours PRN Temp greater or equal to 38C (100.4F), Mild Pain (1 - 3)  dextrose Oral Gel 15 Gram(s) Oral once PRN Blood Glucose LESS THAN 70 milliGRAM(s)/deciliter  metoclopramide Injectable 10 milliGRAM(s) IV Push every 6 hours PRN nausea/vomiting  polyethylene glycol 3350 17 Gram(s) Oral two times a day PRN Constipation  sodium chloride 0.9% lock flush 10 milliLiter(s) IV Push every 1 hour PRN Pre/post blood products, medications, blood draw, and to maintain line patency

## 2024-12-29 NOTE — DISCHARGE NOTE PROVIDER - NSDCMRMEDTOKEN_GEN_ALL_CORE_FT
acetaminophen 325 mg oral tablet: 2 tab(s) orally every 6 hours As needed Temp greater or equal to 38C (100.4F), Mild Pain (1 - 3)  amLODIPine 10 mg oral tablet: 1 tab(s) orally once a day  azaTHIOprine 50 mg oral tablet: 1 tab(s) orally once a day  buPROPion 300 mg/24 hours (XL) oral tablet, extended release: 1 tab(s) orally once a day  carvedilol 12.5 mg oral tablet: 1 tab(s) orally  cloNIDine 0.1 mg oral tablet: 1 tab(s) orally 3 times a day  Crestor 10 mg oral tablet: 1 tab(s) orally once a day  escitalopram 10 mg oral tablet: 1 tab(s) orally once a day  ferrous sulfate:   hydrALAZINE 100 mg oral tablet: 1 tab(s) orally  Insulin Glargine:   Insulin Lispro:   levothyroxine 88 mcg (0.088 mg) oral tablet: 1 tab(s) orally once a day  lisinopril 10 mg oral tablet: 1 tab(s) orally once a day  polyethylene glycol 3350 oral powder for reconstitution: 17 gram(s) orally as needed for  constipation  predniSONE 20 mg oral tablet: 4 tab(s) orally once a day continue through 01/01/24  pyridoxine:   tacrolimus 1 mg oral capsule: 2 cap(s) orally once a day (in the morning)  tacrolimus 1 mg oral capsule: 1 cap(s) orally once a day (in the evening)   acetaminophen 325 mg oral tablet: 2 tab(s) orally every 6 hours As needed Temp greater or equal to 38C (100.4F), Mild Pain (1 - 3)  amLODIPine 10 mg oral tablet: 1 tab(s) orally once a day  azaTHIOprine 50 mg oral tablet: 1 tab(s) orally once a day  buPROPion 300 mg/24 hours (XL) oral tablet, extended release: 1 tab(s) orally once a day  carvedilol 12.5 mg oral tablet: 1 tab(s) orally  cloNIDine 0.1 mg oral tablet: 1 tab(s) orally 3 times a day  Crestor 10 mg oral tablet: 1 tab(s) orally once a day  escitalopram 10 mg oral tablet: 1 tab(s) orally once a day  ferrous sulfate:   hydrALAZINE 100 mg oral tablet: 1 tab(s) orally  Insulin Glargine:   Insulin Lispro:   levothyroxine 88 mcg (0.088 mg) oral tablet: 1 tab(s) orally once a day  lisinopril 10 mg oral tablet: 1 tab(s) orally once a day  picc: NS 10ml  IVP to each lumen weekly + weekly dressing change x 6 months  polyethylene glycol 3350 oral powder for reconstitution: 17 gram(s) orally as needed for  constipation  predniSONE 20 mg oral tablet: 4 tab(s) orally once a day continue through 01/01/24  pyridoxine:   tacrolimus 1 mg oral capsule: 2 cap(s) orally once a day (in the morning)  tacrolimus 1 mg oral capsule: 1 cap(s) orally once a day (in the evening)   acetaminophen 325 mg oral tablet: 2 tab(s) orally every 6 hours As needed Temp greater or equal to 38C (100.4F), Mild Pain (1 - 3)  amLODIPine 10 mg oral tablet: 1 tab(s) orally once a day  azaTHIOprine 50 mg oral tablet: 1 tab(s) orally once a day  buPROPion 300 mg/24 hours (XL) oral tablet, extended release: 1 tab(s) orally once a day  carvedilol 12.5 mg oral tablet: 1 tab(s) orally  cloNIDine 0.1 mg oral tablet: 1 tab(s) orally 3 times a day hold for SBP less than 100, HR&lt;60  Crestor 10 mg oral tablet: 1 tab(s) orally once a day  escitalopram 10 mg oral tablet: 1 tab(s) orally once a day  glucose 40% oral gel: 1 applicator orally 2 times a day as needed for hypoglycemia  hydrALAZINE 100 mg oral tablet: 1 tab(s) orally every 8 hours  Insulin Glargine: 34 unit(s) subcutaneous once a day (at bedtime)  Insulin Lispro:   levothyroxine 88 mcg (0.088 mg) oral tablet: 1 tab(s) orally once a day  lisinopril 10 mg oral tablet: 1 tab(s) orally once a day  picc: NS 10ml  IVP to each lumen weekly + weekly dressing change x 6 months  polyethylene glycol 3350 oral powder for reconstitution: 17 gram(s) orally as needed for  constipation  predniSONE 20 mg oral tablet: 4 tab(s) orally once a day continue through 01/01/24  pyridoxine:   senna leaf extract oral tablet: 2 tab(s) orally once a day (at bedtime) as needed for  constipation  tacrolimus 1 mg oral capsule: 2 cap(s) orally once a day (in the morning)  tacrolimus 1 mg oral capsule: 1 cap(s) orally once a day (in the evening)   acetaminophen 325 mg oral tablet: 2 tab(s) orally every 6 hours As needed Temp greater or equal to 38C (100.4F), Mild Pain (1 - 3)  amLODIPine 10 mg oral tablet: 1 tab(s) orally once a day  azaTHIOprine 50 mg oral tablet: 1 tab(s) orally once a day  buPROPion 300 mg/24 hours (XL) oral tablet, extended release: 1 tab(s) orally once a day  carvedilol 12.5 mg oral tablet: 1 tab(s) orally  cloNIDine 0.1 mg oral tablet: 1 tab(s) orally 3 times a day hold for SBP less than 100, HR&lt;60  Crestor 10 mg oral tablet: 1 tab(s) orally once a day  escitalopram 10 mg oral tablet: 1 tab(s) orally once a day  glucose 40% oral gel: 1 applicator orally 2 times a day as needed for hypoglycemia  hydrALAZINE 100 mg oral tablet: 1 tab(s) orally every 8 hours  Insulin Glargine: 34 unit(s) subcutaneous once a day (at bedtime)  Insulin Lispro:   levothyroxine 88 mcg (0.088 mg) oral tablet: 1 tab(s) orally once a day  lisinopril 10 mg oral tablet: 1 tab(s) orally once a day  picc: NS 10ml  IVP to each lumen weekly + weekly dressing change x 6 months  polyethylene glycol 3350 oral powder for reconstitution: 17 gram(s) orally as needed for  constipation  predniSONE 20 mg oral tablet: 1 tab(s) orally once a day  pyridoxine:   senna leaf extract oral tablet: 2 tab(s) orally once a day (at bedtime) as needed for  constipation  tacrolimus 1 mg oral capsule: 1 cap(s) orally once a day (in the morning)  tacrolimus 1 mg oral capsule: 1 cap(s) orally once a day (in the evening)   acetaminophen 325 mg oral tablet: 2 tab(s) orally every 6 hours As needed Temp greater or equal to 38C (100.4F), Mild Pain (1 - 3)  amLODIPine 10 mg oral tablet: 1 tab(s) orally once a day  azaTHIOprine 50 mg oral tablet: 1 tab(s) orally once a day  buPROPion 300 mg/24 hours (XL) oral tablet, extended release: 1 tab(s) orally once a day  carvedilol 12.5 mg oral tablet: 1 tab(s) orally  cloNIDine 0.1 mg oral tablet: 1 tab(s) orally 3 times a day hold for SBP less than 100, HR&lt;60  Crestor 10 mg oral tablet: 1 tab(s) orally once a day  escitalopram 10 mg oral tablet: 1 tab(s) orally once a day  glucose 40% oral gel: 1 applicator orally 2 times a day as needed for hypoglycemia  hydrALAZINE 100 mg oral tablet: 1 tab(s) orally every 8 hours  Insulin Glargine: 34 unit(s) subcutaneous once a day (at bedtime)  insulin lispro 100 units/mL injectable solution: 18 unit(s) injectable 3 times a day (before meals)  levothyroxine 88 mcg (0.088 mg) oral tablet: 1 tab(s) orally once a day  lisinopril 10 mg oral tablet: 1 tab(s) orally once a day  picc: NS 10ml  IVP to each lumen weekly + weekly dressing change x 6 months  polyethylene glycol 3350 oral powder for reconstitution: 17 gram(s) orally as needed for  constipation  predniSONE 20 mg oral tablet: 1 tab(s) orally once a day  pyridoxine:   senna leaf extract oral tablet: 2 tab(s) orally once a day (at bedtime) as needed for  constipation  tacrolimus 1 mg oral capsule: 1 cap(s) orally once a day (in the morning)  tacrolimus 1 mg oral capsule: 1 cap(s) orally once a day (in the evening)   acetaminophen 325 mg oral tablet: 2 tab(s) orally every 6 hours As needed Temp greater or equal to 38C (100.4F), Mild Pain (1 - 3)  amLODIPine 10 mg oral tablet: 1 tab(s) orally once a day  azaTHIOprine 50 mg oral tablet: 1 tab(s) orally once a day  buPROPion 300 mg/24 hours (XL) oral tablet, extended release: 1 tab(s) orally once a day  cloNIDine 0.1 mg oral tablet: 1 tab(s) orally 3 times a day hold for SBP less than 100, HR&lt;60  Coreg 12.5 mg oral tablet: 1 tab(s) orally every 12 hours  Crestor 10 mg oral tablet: 1 tab(s) orally once a day  escitalopram 10 mg oral tablet: 1 tab(s) orally once a day  glucose 40% oral gel: 1 applicator orally 2 times a day as needed for hypoglycemia  hydrALAZINE 100 mg oral tablet: 1 tab(s) orally every 8 hours  Insulin Glargine: 34 unit(s) subcutaneous once a day (at bedtime)  insulin lispro 100 units/mL injectable solution: 18 unit(s) injectable 3 times a day (before meals)  levothyroxine 88 mcg (0.088 mg) oral tablet: 1 tab(s) orally once a day  lisinopril 10 mg oral tablet: 1 tab(s) orally once a day  picc: NS 10ml  IVP to each lumen weekly + weekly dressing change x 6 months  polyethylene glycol 3350 oral powder for reconstitution: 17 gram(s) orally as needed for  constipation  predniSONE 20 mg oral tablet: 1 tab(s) orally once a day  pyridoxine:   senna leaf extract oral tablet: 2 tab(s) orally once a day (at bedtime) as needed for  constipation  tacrolimus 1 mg oral capsule: 1 cap(s) orally once a day (in the morning)  tacrolimus 1 mg oral capsule: 1 cap(s) orally once a day (in the evening)

## 2024-12-29 NOTE — DIETITIAN INITIAL EVALUATION ADULT - NUTRITIONGOAL OUTCOME1
- patient will consume >75% of meals during hospital admission to help adequately meet nutritional needs and help minimize risk of having unintentional weight loss and help to preserve lean body mass

## 2024-12-29 NOTE — PROGRESS NOTE ADULT - PROBLEM SELECTOR PLAN 2
Not neutropenic, afebrile   Completed Ertapenem for ESBL E coli urosepsis OSH  12/28 CXR (-)  pan cx if fever

## 2024-12-29 NOTE — ADVANCED PRACTICE NURSE CONSULT - ASSESSMENT
pt assessed - AOx2- NAD-pt denies SOB,Chest pain and has no signs of active bleeding. Provided patient and family with verbal explanation. Verbalized understanding.  Labs reviewed on rounds by Dr. Newell. Orders verified via 2 RN method.   Right DL PICC placed at bedside 12/28. Placement confirmation by X ray 12/28, tip on SVC. Patent, Dsg intact,  + Blood return and flushing well.  Pt received zofran 16 mg Iv, emend 150 mg Iv and prednisone 80 mg po as pre meds. Pt had BM 12/28/24. Received miralax po today. Day 2 at at 11:33 pt received    .vinCRIStine IVPB (eMAR)  - Start Date: 29-Dec-2024  1 milliGRAM(s) in sodium chloride 0.9% 50 milliLiter(s), IV Intermittent, once, infuse over 5 Minute(s), infuse at 612 mL/Hr, Stop After 1 Doses  Special Instructions: protect from light, infused as a secondary line over 5 minutes, pt tolerated well. Then at 11:40 pt received Doxorubicin 25 mg/mg= 50 mg  IVP administered over 10 min  slowly side armed with free flowing NS. Positive blood return noted during infusion, being check every 2-3 min. Pt tolerated well. Then at 11:50 pt started cyclophosphamide IVPB (eMAR) [Known as CYTOXAN IVPB (eMAR)] - Start Date: 29-Dec-2024  792 milliGRAM(s) in sodium chloride 0.9% 100 milliLiter(s), IV Intermittent, once, infuse over 1 Hour(s), infuse at 139.6 mL/Hr, Stop After 1 Doses  Administration Instructions: CAUTION: HAZARDOUS DRUG  This is a High Alert Medication. Infusing over 1 hour as a secondary infusion. Pt tolerating well. Primary RN aware of plan of care. safety maintained.   .      .   pt assessed - AOx2- NAD-pt denies SOB,Chest pain and has no signs of active bleeding. Provided patient and family with verbal explanation and written materials. Verbalized understanding.  Labs reviewed on rounds by Dr. Newell. Orders verified via 2 RN method.   Right DL PICC placed at bedside 12/28. Placement confirmation by X ray 12/28, tip on SVC. Patent, Dsg intact,  + Blood return and flushing well.  Pt received zofran 16 mg Iv, emend 150 mg Iv and prednisone 80 mg po as pre meds. Pt had BM 12/28/24. Received miralax po today. Day 2 at at 11:33 pt received    .vinCRIStine IVPB (eMAR)  - Start Date: 29-Dec-2024  1 milliGRAM(s) in sodium chloride 0.9% 50 milliLiter(s), IV Intermittent, once, infuse over 5 Minute(s), infuse at 612 mL/Hr, Stop After 1 Doses  Special Instructions: protect from light, infused as a secondary line over 5 minutes, pt tolerated well. Then at 11:40 pt received Doxorubicin 25 mg/mg= 50 mg  IVP administered over 10 min  slowly side armed with free flowing NS. Positive blood return noted during infusion, being check every 2-3 min. Pt tolerated well. Then at 11:50 pt started cyclophosphamide IVPB (eMAR) [Known as CYTOXAN IVPB (eMAR)] - Start Date: 29-Dec-2024  792 milliGRAM(s) in sodium chloride 0.9% 100 milliLiter(s), IV Intermittent, once, infuse over 1 Hour(s), infuse at 139.6 mL/Hr, Stop After 1 Doses  Administration Instructions: CAUTION: HAZARDOUS DRUG  This is a High Alert Medication. Infusing over 1 hour as a secondary infusion. Pt tolerating well. Primary RN aware of plan of care. safety maintained.   .      .

## 2024-12-29 NOTE — CONSULT NOTE ADULT - SUBJECTIVE AND OBJECTIVE BOX
[facility] DIVISION OF KIDNEY DISEASES AND HYPERTENSION -- INITIAL CONSULT NOTE  --------------------------------------------------------------------------------    HPI:  69-year-old male with PMHx of DM, HTN, HLD, ESRD on HD s/p LKRT 2012 (from brother), hypothyroidism, recent admission to [facility] 11/30/24-12/18/24 for AMS found to have sacral OM and E. Coli bacteremia s/p treatment with meropenam till 12/10, complicated by hypercalcemia  (s/p calcitonin, aredia, and started on prednisone by nephrology), found to have new DLBCL on liver biopsy 12/16/24 transferred to [facility] from rehab to start R-CHOP therapy. Transplant Nephrology consulted for post transplant management and IS.      Pt. seen and examined at bedside earlier today with son and wife present. Per family he has lost over 30 pounds in 3 months and been more confused, but it is improving (not yet at baseline). Renal transplant was from son. Per patient he knows he is on tacrolimus and azathioprine but unsure if he was on prednisone before. Pt. was started on prednisone 20mg at [place] for hypercalcemia by nephrology group there. Pt. A&Ox1-2 to person and year.       PAST HISTORY  --------------------------------------------------------------------------------  PAST MEDICAL & SURGICAL HISTORY:  Hypothyroidism  History of renal transplant      FAMILY HISTORY:    Social History:      ALLERGIES & MEDICATIONS  --------------------------------------------------------------------------------  Allergies    No Known Allergies    Intolerances      Standing Inpatient Medications  amLODIPine   Tablet 10 milliGRAM(s) Oral daily  levothyroxine 88 MICROGram(s) Oral daily  magnesium sulfate  IVPB 1 Gram(s) IV Intermittent once  predniSONE   Tablet 20 milliGRAM(s) Oral daily  tacrolimus 1 milliGRAM(s) Oral <User Schedule>    PRN Inpatient Medications  acetaminophen     Tablet .. 650 milliGRAM(s) Oral every 6 hours PRN      REVIEW OF SYSTEMS  --------------------------------------------------------------------------------  Unable to obtain ROS due to current clinical status.     VITALS/PHYSICAL EXAM  --------------------------------------------------------------------------------  T(C): 36.8 (12-27-24 @ 12:22), Max: 36.8 (12-27-24 @ 12:22)  HR: 59 (12-27-24 @ 12:22) (59 - 59)  BP: 161/77 (12-27-24 @ 12:22) (161/77 - 161/77)  RR: 18 (12-27-24 @ 12:22) (18 - 18)  SpO2: 98% (12-27-24 @ 12:22) (98% - 98%)  Wt(kg): --  Height (cm): 182.9 (12-27-24 @ 12:22)  Weight (kg): 76.4 (12-27-24 @ 12:22)  BMI (kg/m2): 22.8 (12-27-24 @ 12:22)  BSA (m2): 1.98 (12-27-24 @ 12:22)      Physical Exam:  Gen: NAD, mildly confused.  HEENT: PERRL,  Pulm: CTA B/L, no crackles   CV: RRR, S1S2+  Abd: +BS, soft  Transplant: No tenderness, swelling. Prior scar noted.  : No suprapubic tenderness  MSK: no edema   Psych: slow affect  Skin: Warm    LABS/STUDIES  --------------------------------------------------------------------------------              8.6    4.57  >-----------<  208      [12-27-24 @ 13:44]              26.9     132  |  98  |  28  ----------------------------<  257      [12-27-24 @ 13:44]  4.5   |  24  |  0.78        Ca     11.8     [12-27-24 @ 13:44]      Mg     1.7     [12-27-24 @ 13:44]      Phos  2.2     [12-27-24 @ 13:44]    TPro  7.1  /  Alb  3.0  /  TBili  0.3  /  DBili  <0.1  /  AST  30  /  ALT  19  /  AlkPhos  144  [12-27-24 @ 13:44]    PT/INR: PT 11.5 , INR 1.00       [12-27-24 @ 13:44]  PTT: 26.1       [12-27-24 @ 13:44]    Uric acid 6.7      [12-27-24 @ 13:44]    Creatinine Trend:  SCr 0.78 [12-27 @ 13:44]    Urinalysis - [12-27-24 @ 13:44]      Color  / Appearance  / SG  / pH       Gluc 257 / Ketone   / Bili  / Urobili        Blood  / Protein  / Leuk Est  / Nitrite       RBC  / WBC  / Hyaline  / Gran  / Sq Epi  / Non Sq Epi  / Bacteria             Tacrolimus  Cyclosporine  Sirolimus  Mycophenolate  BK PCR  CMV PCR  Parvo PCR  EBV PCR
HPI:  68 y/o M PMHx renal transplant 2012 (2/2 DM, s/p left nephrectomy), peripheral neuropathy, hypothyroidism, retroperitoneal fibrosis, HTN, HLD, GERD, anxiety/depression, ANGELIKA and recent admission at [facility] [place] for sacral osteomyelitis and ESBL.coli urosepsis discharged on 12/18/24 to rehab. While admitted to [place] was noted to have hypercalcemia and liver masses which were biopsied on 12/16/24, biopsy revealed DLBCL. He had a virtual consult with Dr. Garcia today and recommended admission for further treatment.  (27 Dec 2024 16:30)      DIABETES HX:  - History/diagnosis: T2DM since age 32  - Microvascular complications:  reports history of neuropathy, retinopathy, nephropathy leading to ESRD and eventual renal transplant  - Macrovascular complications: denies history of CAD or CVA  - Follows with: Dr. Romero in [place]  - A1C with Estimated Average Glucose Result: 7.8 % (12-29-24)  - Home regimen: glargine 15 units qhs, lispro 15 units TIDAC  - Adherence: reports good adherence  - Diet/lifestyle: low carb    Patient is also on levothyroxine 88 mcg daily for longstanding hypothyroidism. States that this dose has been stable. Denies history of thyroid surgery (states that the well-healed scar at his anterior neck is from a biopsy). Denies history of ALONSO.     Patient hypercalcemic on presentation. Cor Ca 11.6 today improved from 12 on presentation. D1,25OH 101. D25OH 39.8. PTH 14. s/p calcitonin X2 last night and this morning.     PAST MEDICAL & SURGICAL HISTORY:  Hypothyroidism  History of renal transplant  T2DM      Outpatient medications:  amLODIPine 10 mg oral tablet (12-27-24 @ 23:40)  ascorbic acid (12-27-24 @ 23:47)  azaTHIOprine 50 mg oral tablet (12-27-24 @ 23:40)  buPROPion 300 mg/24 hours (XL) oral tablet, extended release (12-27-24 @ 23:39)  carvedilol 12.5 mg oral tablet (12-27-24 @ 23:41)  cloNIDine 0.1 mg oral tablet (12-27-24 @ 23:49)  Crestor 10 mg oral tablet (12-27-24 @ 23:49)  escitalopram 10 mg oral tablet (12-27-24 @ 23:49)  ferrous sulfate (12-27-24 @ 23:49)  hydrALAZINE 100 mg oral tablet (12-27-24 @ 23:49)  Insulin Glargine (12-27-24 @ 23:49)  Insulin Lispro (12-27-24 @ 23:49)  levothyroxine 88 mcg (0.088 mg) oral tablet (12-27-24 @ 23:49)  lisinopril 10 mg oral tablet (12-27-24 @ 23:44)  polyethylene glycol 3350 oral powder for reconstitution (12-27-24 @ 23:47)  predniSONE 20 mg oral tablet (12-27-24 @ 23:46)  pyridoxine (12-27-24 @ 23:48)  senna (sennosides) 8.6 mg oral tablet (12-27-24 @ 23:46)      Inpatient medications:  MEDICATIONS  (STANDING):  allopurinol 100 milliGRAM(s) Oral daily  amLODIPine   Tablet 10 milliGRAM(s) Oral daily  buPROPion XL (24-Hour) . 300 milliGRAM(s) Oral daily  carvedilol 12.5 milliGRAM(s) Oral every 12 hours  chlorhexidine 2% Cloths 1 Application(s) Topical <User Schedule>  dextrose 5%. 1000 milliLiter(s) (50 mL/Hr) IV Continuous <Continuous>  dextrose 5%. 1000 milliLiter(s) (50 mL/Hr) IV Continuous <Continuous>  dextrose 5%. 1000 milliLiter(s) (100 mL/Hr) IV Continuous <Continuous>  dextrose 50% Injectable 25 Gram(s) IV Push once  dextrose 50% Injectable 12.5 Gram(s) IV Push once  dextrose 50% Injectable 25 Gram(s) IV Push once  dextrose 50% Injectable 25 Gram(s) IV Push once  escitalopram 10 milliGRAM(s) Oral daily  glucagon  Injectable 1 milliGRAM(s) IntraMuscular once  heparin   Injectable 5000 Unit(s) SubCutaneous every 12 hours  hydrALAZINE 50 milliGRAM(s) Oral once  insulin glargine Injectable (LANTUS) 40 Unit(s) SubCutaneous at bedtime  insulin lispro (ADMELOG) corrective regimen sliding scale   SubCutaneous three times a day before meals  insulin lispro (ADMELOG) corrective regimen sliding scale   SubCutaneous at bedtime  insulin lispro Injectable (ADMELOG) 15 Unit(s) SubCutaneous three times a day before meals  levothyroxine 88 MICROGram(s) Oral daily  lisinopril 20 milliGRAM(s) Oral daily  potassium chloride    Tablet ER 20 milliEquivalent(s) Oral two times a day  predniSONE   Tablet 80 milliGRAM(s) Oral every 24 hours  sodium chloride 0.9%. 1000 milliLiter(s) (100 mL/Hr) IV Continuous <Continuous>  tacrolimus 1 milliGRAM(s) Oral <User Schedule>  tacrolimus 2 milliGRAM(s) Oral <User Schedule>    MEDICATIONS  (PRN):  acetaminophen     Tablet .. 650 milliGRAM(s) Oral every 6 hours PRN Temp greater or equal to 38C (100.4F), Mild Pain (1 - 3)  dextrose Oral Gel 15 Gram(s) Oral once PRN Blood Glucose LESS THAN 70 milliGRAM(s)/deciliter  metoclopramide Injectable 10 milliGRAM(s) IV Push every 6 hours PRN nausea/vomiting  polyethylene glycol 3350 17 Gram(s) Oral two times a day PRN Constipation  sodium chloride 0.9% lock flush 10 milliLiter(s) IV Push every 1 hour PRN Pre/post blood products, medications, blood draw, and to maintain line patency      Allergies    No Known Allergies    Intolerances      Review of Systems:  Constitutional: No fever  Eyes: No blurry vision  Neuro: No tremors  HEENT: No pain  Cardiovascular: No chest pain, palpitations  Respiratory: No SOB, no cough  GI: No nausea, vomiting, abdominal pain  : No dysuria  Skin: no rash  Psych: no depression  Endocrine: no polyuria, polydipsia  Hem/lymph: no swelling  Osteoporosis: no fractures    ALL OTHER SYSTEMS REVIEWED AND NEGATIVE    PHYSICAL EXAM:  VITALS: T(C): 37 (12-29-24 @ 09:05)  T(F): 98.6 (12-29-24 @ 09:05), Max: 98.8 (12-28-24 @ 13:45)  HR: 61 (12-29-24 @ 09:05) (57 - 72)  BP: 184/65 (12-29-24 @ 09:05) (134/54 - 184/65)  RR:  (18 - 18)  SpO2:  (93% - 97%)  Wt(kg): 76.4 kg  GENERAL: NAD, well-groomed, well-developed  EYES: mild proptosis, no lid lag, anicteric  HEENT:  Atraumatic, Normocephalic, moist mucous membranes, well-healed scar on anterior neck  RESPIRATORY: Normal respiratory effort; no audible wheezing  SKIN: Dry, intact, No rashes or lesions  MUSCULOSKELETAL: Full range of motion, normal strength  NEURO: sensation intact, extraocular movements intact, no tremor  PSYCH: Alert and oriented, normal affect, normal mood  CUSHING'S SIGNS: no striae                          7.7    4.12  )-----------( 207      ( 29 Dec 2024 07:07 )             23.7       12-29    141  |  103  |  23  ----------------------------<  185[H]  3.7   |  28  |  0.82    eGFR: 96    Ca    10.5      12-29  Mg     1.6     12-29  Phos  2.2     12-29    TPro  6.0  /  Alb  2.6[L]  /  TBili  0.3  /  DBili  x   /  AST  21  /  ALT  15  /  AlkPhos  115  12-29      A1C with Estimated Average Glucose Result: 7.8 % (12-29-24 @ 07:07)  A1C with Estimated Average Glucose Result: 7.7 % (12-28-24 @ 10:06)      CAPILLARY BLOOD GLUCOSE      POCT Blood Glucose:  88 mg/dL (12-29-24 @ 09:16)  321 mg/dL (12-28-24 @ 21:43)  416 mg/dL (12-28-24 @ 18:23)  514 mg/dL (12-28-24 @ 16:52)  464 mg/dL (12-28-24 @ 16:49)      eMAR:allopurinol   100 milliGRAM(s) Oral (12-29-24 @ 11:22)   100 milliGRAM(s) Oral (12-28-24 @ 12:25)    calcitonin Injectable   310 International Unit(s) IntraMuscular (12-29-24 @ 06:53)   310 International Unit(s) IntraMuscular (12-28-24 @ 14:59)    hydrocortisone sodium succinate Injectable   100 milliGRAM(s) IV Push (12-28-24 @ 12:28)    insulin glargine Injectable (LANTUS)   40 Unit(s) SubCutaneous (12-28-24 @ 22:21)    insulin lispro (ADMELOG) corrective regimen sliding scale   12 Unit(s) SubCutaneous (12-28-24 @ 17:29)    insulin lispro (ADMELOG) corrective regimen sliding scale   4 Unit(s) SubCutaneous (12-28-24 @ 22:14)    insulin lispro (ADMELOG) corrective regimen sliding scale   5 Unit(s) SubCutaneous (12-28-24 @ 12:24)    insulin lispro Injectable (ADMELOG)   12 Unit(s) SubCutaneous (12-28-24 @ 17:28)    levothyroxine   88 MICROGram(s) Oral (12-29-24 @ 05:39)    predniSONE   Tablet   80 milliGRAM(s) Oral (12-29-24 @ 10:24)

## 2024-12-29 NOTE — PROGRESS NOTE ADULT - NS ATTEND AMEND GEN_ALL_CORE FT
70yo M w/ h/o renal transplant on tacro, now with newly diagnosed DLBCL. Admitted for initiation of treatment w/ mini R-CHOP. Today is C1 D1.    Hx: renal transplant in 2012 2/2 DM, s/p left nephrectomy, peripheral neuropathy, HTN, HLD, ANGELIKA    Heme:  Rituxan today 12/28  mini CHOP on 12/29 - will likely need PICC as difficult access  f/u hepatitis panel, HIV  MUGA EF 72%  Cont allopurinol, IVF  TLS labs q12  Hypercalcemia - calcitonin    Renal:  Cont tacrolimus and prednisone  transplant nephrology consulted     ID: not neutropenic 68yo M w/ h/o renal transplant on tacro, now with newly diagnosed DLBCL. Admitted for initiation of treatment w/ mini R-CHOP. Today is C1 D2.    Hx: renal transplant in 2012 2/2 DM, s/p left nephrectomy, peripheral neuropathy, HTN, HLD, ANGELIKA    Heme:  Rituxan 12/28  mini CHOP on 12/29   PICC placed given difficult access  MUGA EF 72%  Cont allopurinol, IVF  TLS labs q12    Renal:  Cont tacrolimus and prednisone  transplant nephrology consulted   Hypercalcemia - calcitonin x2 12/28. Was previously given pamidronate x2 at [facility] (12/6 and ?12/18)    ID: not neutropenic

## 2024-12-29 NOTE — DISCHARGE NOTE PROVIDER - NSFOLLOWUPCLINICS_GEN_ALL_ED_FT
[facility]  Hematology/Oncology  [address] [id number]  Phone: [phone]  Fax:

## 2024-12-29 NOTE — DIETITIAN INITIAL EVALUATION ADULT - ADD RECOMMEND
1. continue current diet as tolerated of: low sodium, consistent carbohydrate diet  2. encourage PO intake, protein source with each meal, adequate fluid intake as tolerated  3. recommend addition of Ensure Max BID for extra caloric/protein support  4. defer micronutrient supplementation to team as medically appropriate  5. monitor PO intake, weight trend, electrolytes, blood glucose levels, labs, BMs, wound healing

## 2024-12-29 NOTE — DIETITIAN INITIAL EVALUATION ADULT - ENERGY INTAKE
Patient reports roughly fair PO intake/appetite during admission so far. Patient amenable for trial of oral nutrition supplementation for extra caloric/protein support. Fair (50-75%)

## 2024-12-29 NOTE — DIETITIAN INITIAL EVALUATION ADULT - REASON FOR ADMISSION
Acute leukemia not having achieved remission    Per chart, patient is a 70 y/o male with PMH including renal transplant in 2012, peripheral neuropathy, hypothyroidism, retroperitoneal fibrosis, HTN, HLD, GERD, anxiety/depression, ANGELIKA, h/o recent admission to [facility] for sacral osteomyelitis and ESBL EColi urosepsis and during that admission had been noted to have hypercalcemia and liver masses which were biopsied 12/16/2024 revealing DLBCL; patient had a virtual consult w/ Dr. Garcia and had been recommended admission to [facility] for further treatment per chart.

## 2024-12-29 NOTE — DISCHARGE NOTE PROVIDER - NSDCFUADDAPPT_GEN_ALL_CORE_FT
You have a tele health follow-up appointment  You have a video follow-up appointment (TEB) on Monday 1/6/25 at 3pm with Dr. Garcia

## 2024-12-29 NOTE — PHYSICAL THERAPY INITIAL EVALUATION ADULT - GENERAL OBSERVATIONS, REHAB EVAL
received semisupine in bed, A&OX4, following commands, pleasant & willing to participate, a/w DLBCL, BS reviewed

## 2024-12-29 NOTE — DIETITIAN INITIAL EVALUATION ADULT - OTHER CALCULATIONS
Defer fluid needs to team. Calculations accounting for factors of starting treatment for DLBCL, skin integrity, malnutrition.

## 2024-12-29 NOTE — CONSULT NOTE ADULT - ATTENDING COMMENTS
69M lymphoma, DM2 exacerbated by steroid with hyperglycemia and hypoglycemia.  Agree with basal bolus insulin as outlined. Hypercalcemia due to 1,25D best treated with steroid. Complex patient high level decision making.    Shoshana Chaparro MD  Division of Endocrinology  Pager: [phone]    If after 6PM or before 9AM, or on weekends/holidays, please call endocrine answering service for assistance ([phone]).  For nonurgent matters email [email or web address] for assistance.

## 2024-12-29 NOTE — DIETITIAN INITIAL EVALUATION ADULT - OTHER INFO
NKFA per patient. Patient reports that his UBW is 210lbs PTA and has had unintentional weight loss PTA but is unsure as to how much as he has not weighed himself recently. Current BW of 173.7lbs noted during admission, bedscale during visit reading 79.2kg/174.2lbs. No long term anthropometric data available via [facility] growth chart beyond current admission. Will follow weight trend.    - HgbA1C 7.8% (12/29); patient w/ h/o DM PTA per chart. Hypoglycemia noted today with previous hyperglycemia to >500 yesterday. Ordered for lantus, SS & bolus admelog. Endocrinology consulted. Receiving steroid regimen w/ prednisone.  - Ordered for NS IVF, PO synthroid. Ordered for tacrolimus (h/o renal transplant 2012), per team holding imuran in setting of recent infection and chemotherapy.  - Prior hyponatremia 12/28, WNL as of evening of 12/28.  - Intermittent hypophosphatemia noted.  - To start chemotherapy with mini R-CHOP per heme/oncology.

## 2024-12-29 NOTE — DIETITIAN INITIAL EVALUATION ADULT - PROBLEM SELECTOR PLAN 1
Post transplant lymphoproliferative disorder  5cm liver mass--Prior known, s/p liver bx at [facility] showed 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 also noted  14cm splenomegaly since 2018  repeat MRI abdomen 12/2024: 6.6cm liver mass, 17.7cm splenomegaly, partially visualized  retroperitoneal soft tissue encasing aorta and IVC  - recommend start Allopurinol 100 mg daily   - recommend IVF: NS at 100 cc/hr if evidence of TLS or not tolerating PO   - Please order: G6PD, hepatitis B serologies (including core antibody total) and hepatitis C, HIV  - Check: TLS labs every 8 hours (CMP, Phos, LDH, uric acid)  - give rasburicase 3mg IV for uric acid > 8.0  - Transfuse if Hgb < 7.0.

## 2024-12-29 NOTE — DIETITIAN INITIAL EVALUATION ADULT - PERTINENT LABORATORY DATA
12-29    141  |  103  |  23  ----------------------------<  185[H]  3.7   |  28  |  0.82    Ca    10.5      29 Dec 2024 07:07  Phos  2.2     12-29  Mg     1.6     12-29    TPro  6.0  /  Alb  2.6[L]  /  TBili  0.3  /  DBili  x   /  AST  21  /  ALT  15  /  AlkPhos  115  12-29  POCT Blood Glucose.: 106 mg/dL (12-29-24 @ 13:05)  A1C with Estimated Average Glucose Result: 7.8 % (12-29-24 @ 07:07)  A1C with Estimated Average Glucose Result: 7.7 % (12-28-24 @ 10:06)

## 2024-12-29 NOTE — DIETITIAN INITIAL EVALUATION ADULT - SIGNS/SYMPTOMS
pt identified w/ DLBCL to undergo R-CHOP per chart, has stage II PI to sacrum per chart pt w/ >7.5% wt loss x3mo, <75% PO >/= 3mo, muscle/fat depletion

## 2024-12-29 NOTE — PHYSICAL THERAPY INITIAL EVALUATION ADULT - ADDITIONAL COMMENTS
pt lives with spouse in house ; viri spouse id as emergency contact in chart [phone]; pt has no steps to enter , prior to [facility] ; pt transfer here to [facility] ; pt has rolling walker , cane and w/c at home per CM intake

## 2024-12-29 NOTE — PROGRESS NOTE ADULT - PROBLEM SELECTOR PLAN 8
on amlodipine 10 mg daily, carvedilol 12.5 mg BID,, Hydralazine 100 mg TID, lisinopril 20 mg daily  Home  clonidine 0.1 mg TID held on amlodipine 10 mg daily, carvedilol 12.5 mg BID,, , lisinopril 20 mg daily  Home clonidine 0.1 mg TID held, Hydralazine 100 mg TID on amlodipine 10 mg daily, carvedilol 12.5 mg BID,, , lisinopril 20 mg daily  12/29 Hydralazine 50mg BID added, cont to monitor BP   Home clonidine 0.1 mg TID held, Hydralazine 100 mg TID

## 2024-12-29 NOTE — DISCHARGE NOTE PROVIDER - HOSPITAL COURSE
69-year-old male with PMHx of DM, HTN, HLD, ESRD on HD s/p LKRT 2012 (from brother), hypothyroidism, recent admission to [facility] 11/30/24-12/18/24 for AMS found to have sacral OM and E. Coli bacteremia s/p treatment with meropenam till 12/10, complicated by hypercalcemia  (s/p calcitonin, aredia, and started on prednisone by nephrology), found to have new DLBCL on liver biopsy 12/16/24 transferred to [facility] from rehab to start chemo with Mini R-CHOP. Treatment started on 12/28. Hospital course complicated by hypercalcemia and steroid induced hyperglycemia.     Patient discharged back to rehab with outpatient follow-up. 69-year-old male with PMHx of DM, HTN, HLD, ESRD on HD s/p LKRT 2012 (from brother), hypothyroidism, recent admission to [facility] 11/30/24-12/18/24 for AMS found to have sacral OM and E. Coli bacteremia s/p treatment with meropenam till 12/10, complicated by hypercalcemia  (s/p calcitonin, aredia, and started on prednisone by nephrology), found to have new DLBCL on liver biopsy 12/16/24 transferred to [facility] from rehab to start chemo with Mini R-CHOP. Treatment started on 12/28. Hospital course complicated by hypercalcemia and steroid induced hyperglycemia (endocrine consulted, lantus increased to 34units).     Patient received fulphia on 01/01/24.     Patient discharged back to rehab with outpatient follow-up.

## 2024-12-29 NOTE — PROGRESS NOTE ADULT - ASSESSMENT
69-year-old male with PMHx of DM, HTN, HLD, ESRD on HD s/p LKRT 2012 (from brother), hypothyroidism, recent admission to [facility] 11/30/24-12/18/24 for AMS found to have sacral OM and E. Coli bacteremia s/p treatment with meropenam till 12/10, complicated by hypercalcemia  (s/p calcitonin, aredia, and started on prednisone by nephrology), found to have new DLBCL on liver biopsy 12/16/24 transferred to [facility] from rehab to start chemo with   Mini R-CHOP. Pt has anemia and leukopenia from disease.  69-year-old male with PMHx of DM, HTN, HLD, ESRD on HD s/p LKRT 2012 (from brother), hypothyroidism, recent admission to [facility] 11/30/24-12/18/24 for AMS found to have sacral OM and E. Coli bacteremia s/p treatment with meropenam till 12/10, complicated by hypercalcemia  (s/p calcitonin, aredia, and started on prednisone by nephrology), found to have new DLBCL on liver biopsy 12/16/24 transferred to [facility] from rehab to start chemo with  Mini R-CHOP. Pt has anemia and leukopenia from disease.  69-year-old male with PMHx of DM, HTN, HLD, ESRD on HD s/p LKRT 2012 (from brother), hypothyroidism, recent admission to [facility] 11/30/24-12/18/24 for AMS found to have sacral OM and E. Coli bacteremia s/p treatment with meropenam till 12/10, complicated by hypercalcemia  (s/p calcitonin, aredia, and started on prednisone by nephrology), found to have new DLBCL on liver biopsy 12/16/24 transferred to [facility] from rehab to start chemo with  Mini R-CHOP. Treatment started 12/28. Hospital course complicated by hypercalcemia and steroid induced hyperglycemia. Pt has anemia and leukopenia from disease.

## 2024-12-29 NOTE — DIETITIAN INITIAL EVALUATION ADULT - ORAL INTAKE PTA/DIET HISTORY
Patient reports he has had decreased PO intake/appetite PTA. Denied chewing/swallowing impairment or nausea/vomiting. Patient reports that sometimes he has had periods of 2-3 days where he does not eat anything at all and does not have any identifiable reason, and when asking further patient reports he has had overall reduction of appetite. Patient reports no issues w/ food security at home, lives with supportive wife who helps patient with cooking and shopping. Patient reports that as of recent times he has preferred to have frozen dinners/frozen meals, ice cream and ice pops. Patient confirms h/o renal transplant in 2012 w/ no issues since then per the patient. Patient confirms h/o DM PTA and has been using a CGM for at least the past year and reports his average readings are around the 130s at any given time.

## 2024-12-29 NOTE — DIETITIAN INITIAL EVALUATION ADULT - PERSON TAUGHT/METHOD
adequate caloric/protein intake w/ food sources reviewed, current diet order, food preferences, oral nutrition supplementation, strategies to increase PO intake/appetite, all questions were answered/verbal instruction/teach back - (Patient repeats in own words)/patient instructed

## 2024-12-29 NOTE — DISCHARGE NOTE PROVIDER - CARE PROVIDERS DIRECT ADDRESSES
[email or web address]

## 2024-12-29 NOTE — PROGRESS NOTE ADULT - SUBJECTIVE AND OBJECTIVE BOX
Diagnosis: DLBCL    Protocol/Chemo Regimen: mini R CHOP, Rituximab on day1, CHOP to start on day2  Day: 2     Pt endorsed: no complaint    Review of Systems: Patient denied nausea, vomiting, mouth pain,  chest pain, cough, dyspnea, abdominal pain, constipation, diarrhea, rectal pain,  rash, fatigue, headache, bleeding      Pain scale:       denies                                 Location: NA    Diet:  consistent carbo no snack    Allergies:     No Known Allergies      HEME/ONC MEDICATIONS  heparin   Injectable 5000 Unit(s) SubCutaneous every 12 hours  riTUXimab-pvvr (RUXIENCE) IVPB (eMAR) 743 milliGRAM(s) IV Intermittent once      STANDING MEDICATIONS  allopurinol 100 milliGRAM(s) Oral daily  amLODIPine   Tablet 10 milliGRAM(s) Oral daily  buPROPion XL (24-Hour) . 300 milliGRAM(s) Oral daily  calcitonin Injectable 310 International Unit(s) IntraMuscular every 12 hours  carvedilol 12.5 milliGRAM(s) Oral every 12 hours  dextrose 5%. 1000 milliLiter(s) IV Continuous <Continuous>  dextrose 5%. 1000 milliLiter(s) IV Continuous <Continuous>  dextrose 5%. 1000 milliLiter(s) IV Continuous <Continuous>  dextrose 50% Injectable 25 Gram(s) IV Push once  dextrose 50% Injectable 12.5 Gram(s) IV Push once  dextrose 50% Injectable 25 Gram(s) IV Push once  dextrose 50% Injectable 25 Gram(s) IV Push once  escitalopram 10 milliGRAM(s) Oral daily  glucagon  Injectable 1 milliGRAM(s) IntraMuscular once  insulin glargine Injectable (LANTUS) 10 Unit(s) SubCutaneous at bedtime  insulin lispro (ADMELOG) corrective regimen sliding scale   SubCutaneous three times a day before meals  insulin lispro (ADMELOG) corrective regimen sliding scale   SubCutaneous at bedtime  levothyroxine 88 MICROGram(s) Oral daily  lisinopril 20 milliGRAM(s) Oral daily  sodium chloride 0.9%. 1000 milliLiter(s) IV Continuous <Continuous>  tacrolimus 1 milliGRAM(s) Oral <User Schedule>  tacrolimus 2 milliGRAM(s) Oral <User Schedule>      PRN MEDICATIONS  acetaminophen     Tablet .. 650 milliGRAM(s) Oral every 6 hours PRN  dextrose Oral Gel 15 Gram(s) Oral once PRN  polyethylene glycol 3350 17 Gram(s) Oral two times a day PRN      Vital Signs Last 24 Hrs        PHYSICAL EXAM  General: adult in NAD  HEENT: clear oropharynx, anicteric sclera  CV: normal S1/S2 RRR  Lungs: positive air movement b/l ant lungs,clear to auscultation, no wheezes, no rales  Abdomen: soft, + BS,  non-tender non-distended, no hepatosplenomegaly  Ext: no edema  Skin: no rash  Neuro: alert and oriented X 3, no focal deficits  Central Line:  PICC CDI    LABS:               RADIOLOGY & ADDITIONAL STUDIES:    Xray Chest 1 View- PORTABLE-Urgent (Xray Chest 1 View- PORTABLE-Urgent .) (12.28.24 @ 14:48) >  FINDINGS:  Left-sided PICC line with tip in the SVC.  No focal consolidation.  There is no pneumothorax or pleural effusion.  No acute osseous abnormalities. Chronic left-sided rib fractures. Chronic   left humeral neck fracture.    IMPRESSION:  No focal consolidation.     Diagnosis: DLBCL    Protocol/Chemo Regimen: mini R CHOP, Rituximab on day1, CHOP to start on day2  Day: 2     Pt endorsed: no complaint    Review of Systems: Patient denied nausea, vomiting, mouth pain,  chest pain, cough, dyspnea, abdominal pain, constipation, diarrhea, rectal pain,  rash, fatigue, headache, bleeding      Pain scale:       denies                                 Location: NA    Diet:  consistent carbo no snack    Allergies:     No Known Allergies      HEME/ONC MEDICATIONS  heparin   Injectable 5000 Unit(s) SubCutaneous every 12 hours  riTUXimab-pvvr (RUXIENCE) IVPB (eMAR) 743 milliGRAM(s) IV Intermittent once      STANDING MEDICATIONS  allopurinol 100 milliGRAM(s) Oral daily  amLODIPine   Tablet 10 milliGRAM(s) Oral daily  buPROPion XL (24-Hour) . 300 milliGRAM(s) Oral daily  calcitonin Injectable 310 International Unit(s) IntraMuscular every 12 hours  carvedilol 12.5 milliGRAM(s) Oral every 12 hours  dextrose 5%. 1000 milliLiter(s) IV Continuous <Continuous>  dextrose 5%. 1000 milliLiter(s) IV Continuous <Continuous>  dextrose 5%. 1000 milliLiter(s) IV Continuous <Continuous>  dextrose 50% Injectable 25 Gram(s) IV Push once  dextrose 50% Injectable 12.5 Gram(s) IV Push once  dextrose 50% Injectable 25 Gram(s) IV Push once  dextrose 50% Injectable 25 Gram(s) IV Push once  escitalopram 10 milliGRAM(s) Oral daily  glucagon  Injectable 1 milliGRAM(s) IntraMuscular once  insulin glargine Injectable (LANTUS) 10 Unit(s) SubCutaneous at bedtime  insulin lispro (ADMELOG) corrective regimen sliding scale   SubCutaneous three times a day before meals  insulin lispro (ADMELOG) corrective regimen sliding scale   SubCutaneous at bedtime  levothyroxine 88 MICROGram(s) Oral daily  lisinopril 20 milliGRAM(s) Oral daily  sodium chloride 0.9%. 1000 milliLiter(s) IV Continuous <Continuous>  tacrolimus 1 milliGRAM(s) Oral <User Schedule>  tacrolimus 2 milliGRAM(s) Oral <User Schedule>      PRN MEDICATIONS  acetaminophen     Tablet .. 650 milliGRAM(s) Oral every 6 hours PRN  dextrose Oral Gel 15 Gram(s) Oral once PRN  polyethylene glycol 3350 17 Gram(s) Oral two times a day PRN      Vital Signs Last 24 Hrs  T(C): 37 (29 Dec 2024 09:05), Max: 37.1 (28 Dec 2024 13:45)  T(F): 98.6 (29 Dec 2024 09:05), Max: 98.8 (28 Dec 2024 13:45)  HR: 61 (29 Dec 2024 09:05) (57 - 72)  BP: 184/65 (29 Dec 2024 09:05) (134/54 - 184/65)  RR: 18 (29 Dec 2024 09:05) (18 - 18)  SpO2: 97% (29 Dec 2024 09:05) (93% - 97%)    Parameters below as of 29 Dec 2024 09:05  Patient On (Oxygen Delivery Method): room air      PHYSICAL EXAM  General: adult in NAD  HEENT: clear oropharynx, anicteric sclera  CV: normal S1/S2 RRR  Lungs: positive air movement b/l ant lungs,clear to auscultation, no wheezes, no rales  Abdomen: soft, + BS,  non-tender non-distended, no hepatosplenomegaly  Ext: no edema  Skin: no rash  Neuro: alert and oriented X 3, no focal deficits  Central Line:  PICC CDI    LABS:                     7.7    4.12  )-----------( 207      ( 29 Dec 2024 07:07 )             23.7       Mean Cell Volume : 92.2 fl  Mean Cell Hemoglobin : 30.0 pg  Mean Cell Hemoglobin Concentration : 32.5 g/dL  Auto Neutrophil # : 3.65 K/uL  Auto Lymphocyte # : 0.27 K/uL  Auto Monocyte # : 0.13 K/uL  Auto Eosinophil # : 0.03 K/uL  Auto Basophil # : 0.00 K/uL  Auto Neutrophil % : 88.5 %  Auto Lymphocyte % : 6.6 %  Auto Monocyte % : 3.2 %  Auto Eosinophil % : 0.7 %  Auto Basophil % : 0.0 %      12-29    141  |  103  |  23  ----------------------------<  185[H]  3.7   |  28  |  0.82    Ca    10.5      29 Dec 2024 07:07  Phos  2.2     12-29  Mg     1.6     12-29    TPro  6.0  /  Alb  2.6[L]  /  TBili  0.3  /  DBili  x   /  AST  21  /  ALT  15  /  AlkPhos  115  12-29      PT/INR - ( 29 Dec 2024 07:07 )   PT: 11.7 sec;   INR: 1.02 ratio    PTT - ( 29 Dec 2024 07:07 )  PTT:28.2 sec      Uric Acid 5.0      Uric Acid 5.2      RADIOLOGY & ADDITIONAL STUDIES:    Xray Chest 1 View- PORTABLE-Urgent (Xray Chest 1 View- PORTABLE-Urgent .) (12.28.24 @ 14:48) >  FINDINGS:  Left-sided PICC line with tip in the SVC.  No focal consolidation.  There is no pneumothorax or pleural effusion.  No acute osseous abnormalities. Chronic left-sided rib fractures. Chronic   left humeral neck fracture.    IMPRESSION:  No focal consolidation.

## 2024-12-29 NOTE — PHYSICAL THERAPY INITIAL EVALUATION ADULT - PERTINENT HX OF CURRENT PROBLEM, REHAB EVAL
69-year-old male with PMHx of DM, HTN, HLD, ESRD on HD s/p LKRT 2012 (from brother), hypothyroidism, recent admission to [facility] 11/30/24-12/18/24 for AMS found to have sacral OM and E. Coli bacteremia s/p treatment with meropenam till 12/10, complicated by hypercalcemia  (s/p calcitonin, aredia, and started on prednisone by nephrology), found to have new DLBCL on liver biopsy 12/16/24 transferred to [facility] from rehab to start chemo with  Mini R-CHOP. Treatment started 12/28. Hospital course complicated by hypercalcemia and steroid induced hyperglycemia. Pt has anemia and leukopenia from disease.   12/29/24 pt with low BS at 12:07 which was 46 , then 76 , then 13:06 103 now (note  as high as 514 on 12/28/24 12/27/24 MUGA SCAN : Normal gated blood pool study. Normal resting left ventricular ejection fraction of 72% and normal right and left ventricular wall motion.  5cm liver mass--Prior known, s/p liver bx at [facility] showed 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 also noted 14cm splenomegaly since 2018 ;repeat MRI abdomen 12/2024: 6.6cm liver mass, 17.7cm splenomegaly, partially visualized retroperitoneal soft tissue encasing aorta and IVC  CXR 12/28/24 : no focal consolidation , chronic L humeral neck fx and chronic L sided rib fractures

## 2024-12-29 NOTE — CONSULT NOTE ADULT - ASSESSMENT
The patient is a 69y Male with PMH of T2DM, hypothyroidism, ESRD s/p renal transplant who was transferred to this hospital for treatment of DLBCL.  Endocrinology consulted for hyperglycemia    #Type 2 Diabetes Mellitus complicated by neuropathy with  #Steroid-induced hyperglycemia  - Follows with: Dr. Romero in [place]  - A1C with Estimated Average Glucose Result: 7.8 % (12-29-24)  - home regimen: glargine 15 units qhs, lispro 15 units TIDAC  - eGFR: 96 mL/min/1.73m2 (12-29-24)  - Weight (kg): 76.4 (12-27-24)  - glucose elevated, no evidence of DKA on labs  - Of note, patient recived  mg yesterday, prednisone 80 mg this morning.       INPATIENT PLAN:  - Recommend decreasing Lantus to 30 units QHS   - Recommend continunig Admelog 15 units TID before meals (HOLD if NPO or not eating)  - Recommend moderate dose admelog correction scale TIDQAC and separate moderate dose scale QHS  - Please check FSG before meals and QHS, or q6h while NPO  - Inpatient glucose goals: <140 pre-meal, <180 random  - consistent carb diet when eating      DISCHARGE PLANNING:  - Discharge recs pending clinical course, continue basal/bolus insulin  - will need Endocrinology follow up with his provider Dr. Romero    #Hypothyroidism  - TSH 0.36, tT3 34, tT4 6.3  - continue levothyroxine 88 mcg daily    #Hypercalcemia, non-PTH mediated  D1,25OH elevated, likely hypercalcemia of granulomatous disease secondary to DLBCL which is treated with steroids  - S/p calcitonin X2 on 12/28 and 12/29   - Care per heme/onc    #Hyperlipidemia  - LDL goal <70  - on crestor 10 mg daily at home  - check lipid panel as outpatient on a yearly basis    Pending discussion with attending physician.  INCOMPLETE NOTE    The patient is a 69y Male with PMH of T2DM, hypothyroidism, ESRD s/p renal transplant who was transferred to this hospital for treatment of DLBCL.  Endocrinology consulted for hyperglycemia    #Type 2 Diabetes Mellitus complicated by neuropathy with  #Steroid-induced hyperglycemia  - Follows with: Dr. Romero in [place]  - A1C with Estimated Average Glucose Result: 7.8 % (12-29-24)  - home regimen: glargine 15 units qhs, lispro 15 units TIDAC  - eGFR: 96 mL/min/1.73m2 (12-29-24)  - Weight (kg): 76.4 (12-27-24)  - glucose elevated, no evidence of DKA on labs  - Of note, patient recived  mg yesterday, prednisone 80 mg this morning.       INPATIENT PLAN:  - Recommend decreasing Lantus to 30 units QHS   - Recommend Admelog 10 units TID before meals (HOLD if NPO or not eating), can increase to 15 units if postprandial glucose is trending above goal  - Recommend moderate dose admelog correction scale TIDQAC and separate moderate dose scale QHS  - Please check FSG before meals and QHS, or q6h while NPO  - Inpatient glucose goals: 100-180  - consistent carb diet when eating      DISCHARGE PLANNING:  - Discharge recs pending clinical course, continue basal/bolus insulin  - will need Endocrinology follow up with his provider Dr. Romero    #Hypothyroidism  - TSH 0.36, tT3 34, tT4 6.3  - continue levothyroxine 88 mcg daily  - Free T4 in AM    #Hypercalcemia, non-PTH mediated  D1,25OH elevated, likely hypercalcemia of granulomatous disease secondary to DLBCL which is treated with steroids  - S/p calcitonin X2 on 12/28 and 12/29   - Continue prednisone  - No indication for bisphosphonate therapy to treat hypercalcemia at this time    #Hyperlipidemia  - LDL goal <70  - on crestor 10 mg daily at home  - check lipid panel as outpatient on a yearly basis    Discussed with attending physician.  Derrell Sanchez DO   PGY-4 Endocrine Fellow  Can be reached via [employer] Teams.    For follow up questions, discharge recommendations or new consults, please email [email or web address] ([email or web address]) or [email or web address] ([facility]) or call the answering service at [phone] (weekdays); [phone] (nights/weekends).   For emergencies, please page fellow on call.

## 2024-12-29 NOTE — PROGRESS NOTE ADULT - PROBLEM SELECTOR PLAN 1
Post transplant lymphoproliferative disorder  5cm liver mass--Prior known, s/p liver bx at [facility] showed 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 also noted  14cm splenomegaly since 2018  repeat MRI abdomen 12/2024: 6.6cm liver mass, 17.7cm splenomegaly, partially visualized  retroperitoneal soft tissue encasing aorta and IVC  Continue  Allopurinol 100 mg daily   Continue  IVF: NS at 100 cc/hr if evidence of TLS or not tolerating PO   HIV/acute hepatitis panel(-), MUGA EF 72%, FU G6PD level  Transfuse if Hgb < 7.0 or PLT <10, <15 if fever, <20 if bleeding, TLS labs BID keep K4, Mg2  12/28 s/p PICC, CXR confimed placement   Hypomagnesemia: Mg 1 g ivpb x1; hypercalcemia, corrected Ca 12, Calcitonin 310 mg IM q12h x2 doses Post transplant lymphoproliferative disorder  5cm liver mass--Prior known, s/p liver bx at [facility] showed 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 also noted  14cm splenomegaly since 2018  repeat MRI abdomen 12/2024: 6.6cm liver mass, 17.7cm splenomegaly, partially visualized  retroperitoneal soft tissue encasing aorta and IVC  Continue  Allopurinol 100 mg daily   Continue  IVF: NS at 100 cc/hr if evidence of TLS or not tolerating PO   HIV/acute hepatitis panel(-), MUGA EF 72%, FU G6PD level  Transfuse if Hgb < 7.0 or PLT <10, <15 if fever, <20 if bleeding, TLS labs BID keep K4, Mg2  12/29 Hypomagnesemia: Mg 400mg PO; hypercalcemia, corrected Ca 11.6,  Hypokelemia: Potassium supplemented PO 20meq x2.

## 2024-12-29 NOTE — DISCHARGE NOTE PROVIDER - CARE PROVIDER_API CALL
Kervin Garcia  Medical Oncology  [address] [place] [id number]  Phone: [phone]  Fax: [phone]  Established Patient  Follow Up Time:

## 2024-12-29 NOTE — PROVIDER CONTACT NOTE (HYPOGLYCEMIA EVENT) - NS PROVIDER CONTACT BACKGROUND-HYPO
Age: 69y    Gender: Male    POCT Blood Glucose:  46 mg/dL (12-29-24 @ 12:07)  66 mg/dL (12-29-24 @ 12:05)  88 mg/dL (12-29-24 @ 09:16)  321 mg/dL (12-28-24 @ 21:43)  416 mg/dL (12-28-24 @ 18:23)  514 mg/dL (12-28-24 @ 16:52)  464 mg/dL (12-28-24 @ 16:49)      eMAR:allopurinol   100 milliGRAM(s) Oral (12-29-24 @ 11:22)    calcitonin Injectable   310 International Unit(s) IntraMuscular (12-29-24 @ 06:53)   310 International Unit(s) IntraMuscular (12-28-24 @ 14:59)    hydrocortisone sodium succinate Injectable   100 milliGRAM(s) IV Push (12-28-24 @ 12:28)    insulin glargine Injectable (LANTUS)   40 Unit(s) SubCutaneous (12-28-24 @ 22:21)    insulin lispro (ADMELOG) corrective regimen sliding scale   12 Unit(s) SubCutaneous (12-28-24 @ 17:29)    insulin lispro (ADMELOG) corrective regimen sliding scale   4 Unit(s) SubCutaneous (12-28-24 @ 22:14)    insulin lispro Injectable (ADMELOG)   12 Unit(s) SubCutaneous (12-28-24 @ 17:28)    levothyroxine   88 MICROGram(s) Oral (12-29-24 @ 05:39)    predniSONE   Tablet   80 milliGRAM(s) Oral (12-29-24 @ 10:24)

## 2024-12-29 NOTE — DIETITIAN INITIAL EVALUATION ADULT - ETIOLOGY
pt identified w/ DLBCL per chart, pt reporting <PO/arely w/ unintentional wt loss hypermetabolic demand for nutrients 2/2 chronic illness, skin integrity

## 2024-12-29 NOTE — DIETITIAN INITIAL EVALUATION ADULT - REASON INDICATOR FOR ASSESSMENT
Consult for "pressure injury stage 2 or >, MST score 2 or >"  Source: chart, patient  Chart reviewed, events noted

## 2024-12-29 NOTE — DIETITIAN INITIAL EVALUATION ADULT - NSFNSNUTRHOMESUPPLEMENTFT_GEN_A_CORE
Patient reports he has had liquid oral nutrition supplements in the past but unable to name the ones he has tried before

## 2024-12-29 NOTE — PROGRESS NOTE ADULT - PROBLEM SELECTOR PLAN 5
on insulin glargine   35 U QHS and lispro 12 U QAC  12/28 currently on Lantus 10, no AC insulin, SSI low scale   - low 500, will increased lantus to 35, 12 U QAC, moderate QAC and HS SSI  12/28 endo consult requested on insulin glargine   35 U QHS and lispro 12 U QAC  12/28 currently on Lantus 10, no AC insulin, SSI low scale   - low 500, will increased lantus to 35, 12 U QAC, moderate QAC and HS SSI  12/28 endo consult requested  12/29 Hypoglycemic, lantus decreased to 30

## 2024-12-30 LAB
ALBUMIN SERPL ELPH-MCNC: 2.7 G/DL — LOW (ref 3.3–5)
ALBUMIN SERPL ELPH-MCNC: 2.8 G/DL — LOW (ref 3.3–5)
ALP SERPL-CCNC: 116 U/L — SIGNIFICANT CHANGE UP (ref 40–120)
ALP SERPL-CCNC: 117 U/L — SIGNIFICANT CHANGE UP (ref 40–120)
ALT FLD-CCNC: 13 U/L — SIGNIFICANT CHANGE UP (ref 10–45)
ALT FLD-CCNC: 8 U/L — LOW (ref 10–45)
ANION GAP SERPL CALC-SCNC: 11 MMOL/L — SIGNIFICANT CHANGE UP (ref 5–17)
ANION GAP SERPL CALC-SCNC: 9 MMOL/L — SIGNIFICANT CHANGE UP (ref 5–17)
APTT BLD: 30.2 SEC — SIGNIFICANT CHANGE UP (ref 24.5–35.6)
AST SERPL-CCNC: 23 U/L — SIGNIFICANT CHANGE UP (ref 10–40)
AST SERPL-CCNC: 26 U/L — SIGNIFICANT CHANGE UP (ref 10–40)
BASOPHILS # BLD AUTO: 0 K/UL — SIGNIFICANT CHANGE UP (ref 0–0.2)
BASOPHILS NFR BLD AUTO: 0 % — SIGNIFICANT CHANGE UP (ref 0–2)
BILIRUB SERPL-MCNC: 0.4 MG/DL — SIGNIFICANT CHANGE UP (ref 0.2–1.2)
BILIRUB SERPL-MCNC: 0.4 MG/DL — SIGNIFICANT CHANGE UP (ref 0.2–1.2)
BLD GP AB SCN SERPL QL: NEGATIVE — SIGNIFICANT CHANGE UP
BUN SERPL-MCNC: 21 MG/DL — SIGNIFICANT CHANGE UP (ref 7–23)
BUN SERPL-MCNC: 24 MG/DL — HIGH (ref 7–23)
CA-I BLD-SCNC: 1.31 MMOL/L — SIGNIFICANT CHANGE UP (ref 1.15–1.33)
CALCIUM SERPL-MCNC: 9.8 MG/DL — SIGNIFICANT CHANGE UP (ref 8.4–10.5)
CALCIUM SERPL-MCNC: 9.8 MG/DL — SIGNIFICANT CHANGE UP (ref 8.4–10.5)
CHLORIDE SERPL-SCNC: 101 MMOL/L — SIGNIFICANT CHANGE UP (ref 96–108)
CHLORIDE SERPL-SCNC: 102 MMOL/L — SIGNIFICANT CHANGE UP (ref 96–108)
CO2 SERPL-SCNC: 24 MMOL/L — SIGNIFICANT CHANGE UP (ref 22–31)
CO2 SERPL-SCNC: 28 MMOL/L — SIGNIFICANT CHANGE UP (ref 22–31)
CREAT SERPL-MCNC: 0.61 MG/DL — SIGNIFICANT CHANGE UP (ref 0.5–1.3)
CREAT SERPL-MCNC: 0.84 MG/DL — SIGNIFICANT CHANGE UP (ref 0.5–1.3)
D DIMER BLD IA.RAPID-MCNC: 1559 NG/ML DDU — HIGH
EGFR: 105 ML/MIN/1.73M2 — SIGNIFICANT CHANGE UP
EGFR: 96 ML/MIN/1.73M2 — SIGNIFICANT CHANGE UP
EOSINOPHIL # BLD AUTO: 0 K/UL — SIGNIFICANT CHANGE UP (ref 0–0.5)
EOSINOPHIL NFR BLD AUTO: 0 % — SIGNIFICANT CHANGE UP (ref 0–6)
FIBRINOGEN PPP-MCNC: 305 MG/DL — SIGNIFICANT CHANGE UP (ref 200–445)
G6PD RBC-CCNC: 24.1 U/G HGB — HIGH (ref 7–20.5)
GLUCOSE BLDC GLUCOMTR-MCNC: 149 MG/DL — HIGH (ref 70–99)
GLUCOSE BLDC GLUCOMTR-MCNC: 225 MG/DL — HIGH (ref 70–99)
GLUCOSE BLDC GLUCOMTR-MCNC: 230 MG/DL — HIGH (ref 70–99)
GLUCOSE BLDC GLUCOMTR-MCNC: 301 MG/DL — HIGH (ref 70–99)
GLUCOSE BLDC GLUCOMTR-MCNC: 323 MG/DL — HIGH (ref 70–99)
GLUCOSE SERPL-MCNC: 167 MG/DL — HIGH (ref 70–99)
GLUCOSE SERPL-MCNC: 350 MG/DL — HIGH (ref 70–99)
HCT VFR BLD CALC: 26.3 % — LOW (ref 39–50)
HGB BLD-MCNC: 8.4 G/DL — LOW (ref 13–17)
HUMAN ERYTHROCYTE ANTIGEN PANEL RESULT: SIGNIFICANT CHANGE UP
IMM GRANULOCYTES NFR BLD AUTO: 0.6 % — SIGNIFICANT CHANGE UP (ref 0–0.9)
INR BLD: 0.97 RATIO — SIGNIFICANT CHANGE UP (ref 0.85–1.16)
LDH SERPL L TO P-CCNC: 353 U/L — HIGH (ref 50–242)
LDH SERPL L TO P-CCNC: 392 U/L — HIGH (ref 50–242)
LYMPHOCYTES # BLD AUTO: 0.12 K/UL — LOW (ref 1–3.3)
LYMPHOCYTES # BLD AUTO: 2.6 % — LOW (ref 13–44)
MAGNESIUM SERPL-MCNC: 1.8 MG/DL — SIGNIFICANT CHANGE UP (ref 1.6–2.6)
MAGNESIUM SERPL-MCNC: 1.9 MG/DL — SIGNIFICANT CHANGE UP (ref 1.6–2.6)
MCHC RBC-ENTMCNC: 29.2 PG — SIGNIFICANT CHANGE UP (ref 27–34)
MCHC RBC-ENTMCNC: 31.9 G/DL — LOW (ref 32–36)
MCV RBC AUTO: 91.3 FL — SIGNIFICANT CHANGE UP (ref 80–100)
MONOCYTES # BLD AUTO: 0.16 K/UL — SIGNIFICANT CHANGE UP (ref 0–0.9)
MONOCYTES NFR BLD AUTO: 3.4 % — SIGNIFICANT CHANGE UP (ref 2–14)
NEUTROPHILS # BLD AUTO: 4.34 K/UL — SIGNIFICANT CHANGE UP (ref 1.8–7.4)
NEUTROPHILS NFR BLD AUTO: 93.4 % — HIGH (ref 43–77)
NRBC # BLD: 0 /100 WBCS — SIGNIFICANT CHANGE UP (ref 0–0)
PHOSPHATE SERPL-MCNC: 2.3 MG/DL — LOW (ref 2.5–4.5)
PHOSPHATE SERPL-MCNC: 2.6 MG/DL — SIGNIFICANT CHANGE UP (ref 2.5–4.5)
PLATELET # BLD AUTO: 192 K/UL — SIGNIFICANT CHANGE UP (ref 150–400)
POTASSIUM SERPL-MCNC: 4.9 MMOL/L — SIGNIFICANT CHANGE UP (ref 3.5–5.3)
POTASSIUM SERPL-MCNC: 5.1 MMOL/L — SIGNIFICANT CHANGE UP (ref 3.5–5.3)
POTASSIUM SERPL-SCNC: 4.9 MMOL/L — SIGNIFICANT CHANGE UP (ref 3.5–5.3)
POTASSIUM SERPL-SCNC: 5.1 MMOL/L — SIGNIFICANT CHANGE UP (ref 3.5–5.3)
PROT SERPL-MCNC: 6 G/DL — SIGNIFICANT CHANGE UP (ref 6–8.3)
PROT SERPL-MCNC: 6.2 G/DL — SIGNIFICANT CHANGE UP (ref 6–8.3)
PROTHROM AB SERPL-ACNC: 11.1 SEC — SIGNIFICANT CHANGE UP (ref 9.9–13.4)
RBC # BLD: 2.88 M/UL — LOW (ref 4.2–5.8)
RBC # FLD: 18.8 % — HIGH (ref 10.3–14.5)
RH IG SCN BLD-IMP: POSITIVE — SIGNIFICANT CHANGE UP
SODIUM SERPL-SCNC: 135 MMOL/L — SIGNIFICANT CHANGE UP (ref 135–145)
SODIUM SERPL-SCNC: 140 MMOL/L — SIGNIFICANT CHANGE UP (ref 135–145)
T4 FREE SERPL-MCNC: 1.3 NG/DL — SIGNIFICANT CHANGE UP (ref 0.9–1.7)
TACROLIMUS SERPL-MCNC: 3.1 NG/ML — SIGNIFICANT CHANGE UP
URATE SERPL-MCNC: 3.8 MG/DL — SIGNIFICANT CHANGE UP (ref 3.4–8.8)
URATE SERPL-MCNC: 4.3 MG/DL — SIGNIFICANT CHANGE UP (ref 3.4–8.8)
WBC # BLD: 4.65 K/UL — SIGNIFICANT CHANGE UP (ref 3.8–10.5)
WBC # FLD AUTO: 4.65 K/UL — SIGNIFICANT CHANGE UP (ref 3.8–10.5)

## 2024-12-30 PROCEDURE — 99232 SBSQ HOSP IP/OBS MODERATE 35: CPT | Mod: GC

## 2024-12-30 PROCEDURE — 99233 SBSQ HOSP IP/OBS HIGH 50: CPT | Mod: FS

## 2024-12-30 RX ORDER — HYDRALAZINE HYDROCHLORIDE 10 MG/1
100 TABLET ORAL EVERY 8 HOURS
Refills: 0 | Status: DISCONTINUED | OUTPATIENT
Start: 2024-12-30 | End: 2025-01-02

## 2024-12-30 RX ORDER — SODIUM PHOSPHATE, MONOBASIC, MONOHYDRATE AND SODIUM PHOSPHATE, DIBASIC ANHYDROUS 142; 276 MG/ML; MG/ML
15 INJECTION, SOLUTION INTRAVENOUS ONCE
Refills: 0 | Status: COMPLETED | OUTPATIENT
Start: 2024-12-30 | End: 2024-12-30

## 2024-12-30 RX ORDER — MAGNESIUM SULFATE 500 MG/ML
1 INJECTION, SOLUTION INTRAMUSCULAR; INTRAVENOUS ONCE
Refills: 0 | Status: COMPLETED | OUTPATIENT
Start: 2024-12-30 | End: 2024-12-30

## 2024-12-30 RX ORDER — INSULIN LISPRO 100/ML
14 VIAL (ML) SUBCUTANEOUS
Refills: 0 | Status: DISCONTINUED | OUTPATIENT
Start: 2024-12-30 | End: 2024-12-31

## 2024-12-30 RX ORDER — FUROSEMIDE 20 MG
20 TABLET ORAL ONCE
Refills: 0 | Status: COMPLETED | OUTPATIENT
Start: 2024-12-30 | End: 2024-12-30

## 2024-12-30 RX ADMIN — Medication 10 MILLIGRAM(S): at 05:06

## 2024-12-30 RX ADMIN — SODIUM PHOSPHATE, MONOBASIC, MONOHYDRATE AND SODIUM PHOSPHATE, DIBASIC ANHYDROUS 127.5 MILLIMOLE(S): 142; 276 INJECTION, SOLUTION INTRAVENOUS at 20:51

## 2024-12-30 RX ADMIN — Medication 20 MILLIGRAM(S): at 18:12

## 2024-12-30 RX ADMIN — POTASSIUM CHLORIDE 20 MILLIEQUIVALENT(S): 600 TABLET, FILM COATED, EXTENDED RELEASE ORAL at 17:29

## 2024-12-30 RX ADMIN — Medication 8: at 17:27

## 2024-12-30 RX ADMIN — HYDRALAZINE HYDROCHLORIDE 100 MILLIGRAM(S): 10 TABLET ORAL at 21:38

## 2024-12-30 RX ADMIN — BUPROPION HYDROCHLORIDE 300 MILLIGRAM(S): 150 TABLET, EXTENDED RELEASE ORAL at 12:13

## 2024-12-30 RX ADMIN — Medication 4: at 12:11

## 2024-12-30 RX ADMIN — BACLOFEN 5 MILLIGRAM(S): 10 TABLET ORAL at 21:25

## 2024-12-30 RX ADMIN — TACROLIMUS 2 MILLIGRAM(S): 0.5 CAPSULE ORAL at 09:23

## 2024-12-30 RX ADMIN — LEVOTHYROXINE SODIUM 88 MICROGRAM(S): 175 TABLET ORAL at 05:07

## 2024-12-30 RX ADMIN — LISINOPRIL 20 MILLIGRAM(S): 30 TABLET ORAL at 05:07

## 2024-12-30 RX ADMIN — HYDRALAZINE HYDROCHLORIDE 100 MILLIGRAM(S): 10 TABLET ORAL at 13:56

## 2024-12-30 RX ADMIN — MAGNESIUM SULFATE 100 GRAM(S): 500 INJECTION, SOLUTION INTRAMUSCULAR; INTRAVENOUS at 09:22

## 2024-12-30 RX ADMIN — INSULIN GLARGINE-YFGN 30 UNIT(S): 100 INJECTION, SOLUTION SUBCUTANEOUS at 21:28

## 2024-12-30 RX ADMIN — Medication 14 UNIT(S): at 17:27

## 2024-12-30 RX ADMIN — HYDRALAZINE HYDROCHLORIDE 50 MILLIGRAM(S): 10 TABLET ORAL at 05:07

## 2024-12-30 RX ADMIN — ALLOPURINOL 100 MILLIGRAM(S): 100 TABLET ORAL at 12:13

## 2024-12-30 RX ADMIN — Medication 10 UNIT(S): at 09:25

## 2024-12-30 RX ADMIN — HEPARIN SODIUM 5000 UNIT(S): 1000 INJECTION, SOLUTION INTRAVENOUS; SUBCUTANEOUS at 17:30

## 2024-12-30 RX ADMIN — POTASSIUM CHLORIDE 20 MILLIEQUIVALENT(S): 600 TABLET, FILM COATED, EXTENDED RELEASE ORAL at 05:06

## 2024-12-30 RX ADMIN — SODIUM CHLORIDE 100 MILLILITER(S): 9 INJECTION, SOLUTION INTRAMUSCULAR; INTRAVENOUS; SUBCUTANEOUS at 05:08

## 2024-12-30 RX ADMIN — CARVEDILOL 12.5 MILLIGRAM(S): 25 TABLET, FILM COATED ORAL at 17:29

## 2024-12-30 RX ADMIN — MAGNESIUM SULFATE 25 GRAM(S): 500 INJECTION, SOLUTION INTRAMUSCULAR; INTRAVENOUS at 00:05

## 2024-12-30 RX ADMIN — ESCITALOPRAM OXALATE 10 MILLIGRAM(S): 10 TABLET ORAL at 12:13

## 2024-12-30 RX ADMIN — Medication 4: at 09:26

## 2024-12-30 RX ADMIN — HEPARIN SODIUM 5000 UNIT(S): 1000 INJECTION, SOLUTION INTRAVENOUS; SUBCUTANEOUS at 05:07

## 2024-12-30 RX ADMIN — Medication 80 MILLIGRAM(S): at 09:36

## 2024-12-30 RX ADMIN — CARVEDILOL 12.5 MILLIGRAM(S): 25 TABLET, FILM COATED ORAL at 05:07

## 2024-12-30 RX ADMIN — CHLORHEXIDINE GLUCONATE 1 APPLICATION(S): 1.2 RINSE ORAL at 05:08

## 2024-12-30 RX ADMIN — Medication 4: at 21:30

## 2024-12-30 RX ADMIN — Medication 10 UNIT(S): at 12:12

## 2024-12-30 NOTE — PROGRESS NOTE ADULT - PROBLEM SELECTOR PLAN 6
s/p LKRT 2012 and L native nephrectomy.   Per Hillary ZEPEDA/[facility], pt. baseline SCR ~1.0. Pt. SCr is at baseline.   - Obtain U/A  - Monitor labs and I/Os. Avoid nephrotoxins including, ACE/ARB, NSAIDs, contrast, etc. Dose medications as per eGFR.   2. Immunosuppression  Pt. is on Tacrolimus 2mg AM/1mg PM, imuran 50mg BID, and prednisone 20mg for hypercalcemia.   - Continue tacrolimus and prednisone   - Hold imuran in setting of recent infection and chemotherapy.   3. Hypercalcemia  Calcium on admission to [facility] 11.8. Likely related to new DLBCL diagnosis.   - Recommend Vitamin D 1,25 and 25-OH, PTH, ionized Ca  - Recommend maintenance  IVFs for now  - Recommend getting results from [facility] regarding dates of pamidronate and calcitonin treatment.  12/28 tacro level 8.1, keep same dose, FU repeat in am, d/w Dr Key renal transplant s/p LKRT 2012 and L native nephrectomy.   Per Hillary ZEPEDA/[facility], pt. baseline SCR ~1.0. Pt. SCr is at baseline.   - Obtain U/A  - Monitor labs and I/Os. Avoid nephrotoxins including, ACE/ARB, NSAIDs, contrast, etc. Dose medications as per eGFR.   2. Immunosuppression  Pt. is on Tacrolimus 2mg AM/1mg PM, imuran 50mg BID, and prednisone 20mg for hypercalcemia.   - Continue tacrolimus and prednisone   - Hold imuran in setting of recent infection and chemotherapy.   3. Hypercalcemia  Calcium on admission to [facility] 11.8. Likely related to new DLBCL diagnosis.   - Recommend Vitamin D 1,25 and 25-OH, PTH, ionized Ca  - Recommend maintenance  IVFs for now  - Recommend getting results from [facility] regarding dates of pamidronate and calcitonin treatment.  12/28 tacro level 8.1, keep same dose  12/30: Stopped tacrolimus, as pt. currently receiving cytoxan as part of R-CHOP regimen.

## 2024-12-30 NOTE — PROGRESS NOTE ADULT - NS ATTEND AMEND GEN_ALL_CORE FT
68yo M w/ h/o renal transplant on tacro, now with newly diagnosed DLBCL. Admitted for initiation of treatment w/ mini R-CHOP. Today is C1 D2.    Hx: renal transplant in 2012 2/2 DM, s/p left nephrectomy, peripheral neuropathy, HTN, HLD, ANGELIKA    Heme:  Rituxan 12/28  mini CHOP on 12/29   PICC placed given difficult access  MUGA EF 72%  Cont allopurinol, IVF  TLS labs q12    Renal:  Cont tacrolimus and prednisone  transplant nephrology consulted   Hypercalcemia - calcitonin x2 12/28. Was previously given pamidronate x2 at [facility] (12/6 and ?12/18)    ID: not neutropenic 68yo M w/ h/o renal transplant on tacro, now with newly diagnosed DLBCL. Admitted for initiation of treatment w/ mini R-CHOP. Today is C1 D3.    Hx: renal transplant in 2012 2/2 DM, s/p left nephrectomy, peripheral neuropathy, HTN, HLD, ANGELIKA    Heme:  Rituxan 12/28  mini CHOP on 12/29   PICC placed given difficult access  MUGA EF 72%  Cont allopurinol, IVF  TLS labs q12    Renal:  Cont tacrolimus and prednisone  transplant nephrology consulted   Hypercalcemia - calcitonin x2 12/28. Was previously given pamidronate x2 at [place] (12/6 and ?12/18)    ID:   not neutropenic    Dispo:  Patient is bedbound at this point, unclear etiology based on historical documentation right now.   Will have PT evaluate and have patient planned to return to rehab. Will give Fulphila prior to going.

## 2024-12-30 NOTE — PROGRESS NOTE ADULT - ASSESSMENT
69-year-old male with PMHx of DM, HTN, HLD, ESRD on HD s/p LKRT 2012 (from brother), hypothyroidism, recent admission to [facility] 11/30/24-12/18/24 for AMS found to have sacral OM and E. Coli bacteremia s/p treatment with meropenam till 12/10, complicated by hypercalcemia  (s/p calcitonin, aredia, and started on prednisone by nephrology), found to have new DLBCL on liver biopsy 12/16/24 transferred to [facility] from rehab to start chemo with  Mini R-CHOP. Treatment started 12/28. Hospital course complicated by hypercalcemia and steroid induced hyperglycemia. Pt has anemia and leukopenia from disease.

## 2024-12-30 NOTE — PROGRESS NOTE ADULT - ASSESSMENT
The patient is a 69y Male with PMH of T2DM, hypothyroidism, ESRD s/p renal transplant who was transferred to this hospital for treatment of DLBCL.  Endocrinology consulted for hyperglycemia    #Type 2 Diabetes Mellitus complicated by neuropathy with  #Steroid-induced hyperglycemia  - Follows with: Dr. Romero in [place]  - A1C with Estimated Average Glucose Result: 7.8 % (12-29-24)  - home regimen: glargine 15 units qhs, lispro 15 units TIDAC  - eGFR: 96 mL/min/1.73m2 (12-29-24)  - Weight (kg): 76.4 (12-27-24)  - glucose elevated, no evidence of DKA on labs  - Of note, patient received  mg yesterday, prednisone 80 mg this morning.       INPATIENT PLAN:  - Recommend c/w Lantus 30 units QHS   - Recommend increase Admelog to 14 units TID before meals (HOLD if NPO or not eating)  - Recommend c/w moderate dose admelog correction scale TIDQAC and separate moderate dose scale QHS  - Please check FSG before meals and QHS, or q6h while NPO  - Inpatient glucose goals: 100-180  - consistent carb diet when eating      DISCHARGE PLANNING:  - Discharge recs pending clinical course, continue basal/bolus insulin  - will need Endocrinology follow up with his provider Dr. Romero    #Hypothyroidism  - TSH 0.36, tT3 34, tT4 6.3, free T4 1.3  - continue levothyroxine 88 mcg daily    #Hypercalcemia, non-PTH mediated  D1,25OH elevated, likely hypercalcemia of granulomatous disease secondary to DLBCL which is treated with steroids  - corrected calcium 10.8 today (12/30), decreasing  - S/p calcitonin X2 on 12/28 and 12/29   - Continue prednisone  - No indication for bisphosphonate therapy to treat hypercalcemia at this time    #Hyperlipidemia  - LDL goal <70  - on crestor 10 mg daily at home  - check lipid panel as outpatient on a yearly basis    Ifeanyi Anthony MD  Endocrine Fellow  For nonurgent matters, please email [email or web address] or [email or web address]. For urgent matters only, please call answering service at [phone] (weekdays), [phone] (nights/weekends).

## 2024-12-30 NOTE — ADVANCED PRACTICE NURSE CONSULT - ASSESSMENT
Patient encountered on 12/30/24 on [address] [place] [address].  When wound care RN arrived on unit, patient was lying in a semi-menendez position. Mr. Zacarias was alert, oriented, and gave consent for skin consultation. Patient was able to turn without assistance. Urinary incontinence managed by Pure wick external catheter.  Once turned, able to view his skin.   Bilateral sacrum/buttock non-blanchable deep red / purple discoloration, consistent with an evolving deep tissue injury, present on admission. Size approximately 8.0 cm x 8.0 cm x 0.1 cm, present on admission, with an open wound measuring approximately 1.5 x 0.5 x 0.1 cm. Recommended Triad 2 X Daily or PRN Soiling.   Penis wound, present on admission, with 100% loose fibrinous tissue, two wounds within measurement, measuring approximately 0.5 x 3.0 x 0.1 cm. Recommended Triad 2 X Daily or PRN Soiling.  Patient, and RN were educated on the importance of turning and positioning every 2 hours. The use of waffle cushion when out of bed to chair, and to shift her weight every 2 hours while in chair. The importance of keeping his skin clean and dry and to offload feet/heels, and optimal nutrition.     When the consultation was completed, the patient was left in a right sided position, with side rails up, call bell within reach, and bed in lowest position. Discussed plan of care with Judith Solano (RN).  Patient encountered on 12/30/24 on [address] [place] [address].  When wound care RN arrived on unit, patient was lying in a semi-menendez position. Mr. English was alert, oriented, and gave consent for skin consultation. Patient was able to turn without assistance. Urinary incontinence managed by Pure wick external catheter.  Once turned, able to view his skin.   Bilateral sacrum/buttock non-blanchable deep red / purple discoloration, consistent with an evolving deep tissue injury, present on admission. Size approximately 8.0 cm x 8.0 cm x 0.1 cm, present on admission, with an open wound measuring approximately 1.5 x 0.5 x 0.1 cm. Recommended Triad 2 X Daily or PRN Soiling.   Penis wound, present on admission, with 100% loose fibrinous tissue, two wounds within measurement, measuring approximately 0.5 x 3.0 x 0.1 cm. Recommended Triad 2 X Daily or PRN Soiling.  Patient, and RN were educated on the importance of turning and positioning every 2 hours. The use of waffle cushion when out of bed to chair, and to shift her weight every 2 hours while in chair. The importance of keeping his skin clean and dry and to offload feet/heels, and optimal nutrition.     When the consultation was completed, the patient was left in a right sided position, with side rails up, call bell within reach, and bed in lowest position. Discussed plan of care with Judith Solano (NURA).  Patient encountered on 12/30/24 on [address] [place] [address].  When wound care RN arrived on unit, patient was lying in a semi-menendez position. Mr. English was alert, oriented, and gave consent for skin consultation. Patient was able to turn without assistance. Urinary incontinence managed by Pure wick external catheter.  Once turned, able to view his skin.   Bilateral sacrum/buttock non-blanchable deep red / purple discoloration, consistent with an evolving deep tissue injury, present on admission. Size approximately 8.0 cm x 8.0 cm x 0.1 cm, present on admission, with an open wound measuring approximately 1.5 x 0.5 x 0.1 cm. Recommended Triad 2 X Daily or PRN Soiling.   Penis wound, present on admission, with 100% loose fibrinous tissue, two wounds within measurement, measuring approximately 0.5 x 3.0 x 0.1 cm. Recommended Triad 2 X Daily or PRN Soiling.  Patient, and RN were educated on the importance of turning and positioning every 2 hours. The use of waffle cushion when out of bed to chair, and to shift his weight every 2 hours while in chair. The importance of keeping his skin clean and dry and to offload feet/heels, and optimal nutrition.     When the consultation was completed, the patient was left in a right sided position, with side rails up, call bell within reach, and bed in lowest position. Discussed plan of care with Judith Solano (NURA).

## 2024-12-30 NOTE — PROGRESS NOTE ADULT - PROBLEM SELECTOR PLAN 1
Post transplant lymphoproliferative disorder  5cm liver mass--Prior known, s/p liver bx at [facility] showed 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 also noted  14cm splenomegaly since 2018  repeat MRI abdomen 12/2024: 6.6cm liver mass, 17.7cm splenomegaly, partially visualized  retroperitoneal soft tissue encasing aorta and IVC  Continue  Allopurinol 100 mg daily   Continue  IVF: NS at 100 cc/hr if evidence of TLS or not tolerating PO   HIV/acute hepatitis panel(-), MUGA EF 72%, FU G6PD level  Transfuse if Hgb < 7.0 or PLT <10, <15 if fever, <20 if bleeding, TLS labs BID keep K4, Mg2  12/29 Hypomagnesemia: Mg 400mg PO; hypercalcemia, corrected Ca 11.6,  Hypokelemia: Potassium supplemented PO 20meq x2. Post transplant lymphoproliferative disorder  5cm liver mass--Prior known, s/p liver bx at [facility] showed 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 also noted  14cm splenomegaly since 2018  repeat MRI abdomen 12/2024: 6.6cm liver mass, 17.7cm splenomegaly, partially visualized  retroperitoneal soft tissue encasing aorta and IVC  Continue  Allopurinol 100 mg daily   Continue  IVF: NS at 100 cc/hr if evidence of TLS or not tolerating PO   HIV/acute hepatitis panel(-), MUGA EF 72%, G6PD level 24  Transfuse if Hgb < 7.0 or PLT <10, <15 if fever, <20 if bleeding, TLS labs BID keep K4, Mg2

## 2024-12-30 NOTE — PROGRESS NOTE ADULT - SUBJECTIVE AND OBJECTIVE BOX
[facility] DIVISION OF KIDNEY DISEASES AND HYPERTENSION -- FOLLOW UP NOTE  --------------------------------------------------------------------------------  Chief Complaint: s/p LKRT 2012 and L native nephrectomy.     24 hour events/subjective: Pt. seen and examined at bedside earlier today. Only nodding yes/no to questions.          PAST HISTORY  --------------------------------------------------------------------------------  No significant changes to PMH, PSH, FHx, SHx, unless otherwise noted    ALLERGIES & MEDICATIONS  --------------------------------------------------------------------------------  Allergies    No Known Allergies    Intolerances      Standing Inpatient Medications  allopurinol 100 milliGRAM(s) Oral daily  amLODIPine   Tablet 10 milliGRAM(s) Oral daily  buPROPion XL (24-Hour) . 300 milliGRAM(s) Oral daily  carvedilol 12.5 milliGRAM(s) Oral every 12 hours  chlorhexidine 2% Cloths 1 Application(s) Topical <User Schedule>  dextrose 5%. 1000 milliLiter(s) IV Continuous <Continuous>  dextrose 5%. 1000 milliLiter(s) IV Continuous <Continuous>  dextrose 5%. 1000 milliLiter(s) IV Continuous <Continuous>  dextrose 50% Injectable 25 Gram(s) IV Push once  dextrose 50% Injectable 12.5 Gram(s) IV Push once  dextrose 50% Injectable 25 Gram(s) IV Push once  dextrose 50% Injectable 25 Gram(s) IV Push once  escitalopram 10 milliGRAM(s) Oral daily  glucagon  Injectable 1 milliGRAM(s) IntraMuscular once  heparin   Injectable 5000 Unit(s) SubCutaneous every 12 hours  hydrALAZINE 100 milliGRAM(s) Oral every 8 hours  insulin glargine Injectable (LANTUS) 30 Unit(s) SubCutaneous at bedtime  insulin lispro (ADMELOG) corrective regimen sliding scale   SubCutaneous three times a day before meals  insulin lispro (ADMELOG) corrective regimen sliding scale   SubCutaneous at bedtime  insulin lispro Injectable (ADMELOG) 14 Unit(s) SubCutaneous three times a day before meals  levothyroxine 88 MICROGram(s) Oral daily  lisinopril 20 milliGRAM(s) Oral daily  potassium chloride    Tablet ER 20 milliEquivalent(s) Oral two times a day  predniSONE   Tablet 80 milliGRAM(s) Oral every 24 hours  sodium chloride 0.9%. 1000 milliLiter(s) IV Continuous <Continuous>  tacrolimus 1 milliGRAM(s) Oral <User Schedule>  tacrolimus 2 milliGRAM(s) Oral <User Schedule>    PRN Inpatient Medications  acetaminophen     Tablet .. 650 milliGRAM(s) Oral every 6 hours PRN  baclofen 5 milliGRAM(s) Oral every 8 hours PRN  dextrose Oral Gel 15 Gram(s) Oral once PRN  metoclopramide Injectable 10 milliGRAM(s) IV Push every 6 hours PRN  polyethylene glycol 3350 17 Gram(s) Oral two times a day PRN  sodium chloride 0.9% lock flush 10 milliLiter(s) IV Push every 1 hour PRN      REVIEW OF SYSTEMS  --------------------------------------------------------------------------------  Unable to obtain ROS due to current clinical status.     VITALS/PHYSICAL EXAM  --------------------------------------------------------------------------------  T(C): 36.7 (12-30-24 @ 13:15), Max: 36.9 (12-30-24 @ 09:40)  HR: 63 (12-30-24 @ 13:15) (60 - 65)  BP: 129/63 (12-30-24 @ 13:15) (129/63 - 179/68)  RR: 18 (12-30-24 @ 13:15) (18 - 18)  SpO2: 98% (12-30-24 @ 13:15) (96% - 98%)  Wt(kg): --        12-29-24 @ 07:01  -  12-30-24 @ 07:00  --------------------------------------------------------  IN: 2580 mL / OUT: 2775 mL / NET: -195 mL    12-30-24 @ 07:01  -  12-30-24 @ 13:56  --------------------------------------------------------  IN: 300 mL / OUT: 925 mL / NET: -625 mL        Physical Exam:  Gen: NAD, mildly confused.  HEENT: PERRL,  Pulm: CTA B/L, no crackles   CV: RRR, S1S2+  Abd: +BS, soft  Transplant: No tenderness, swelling. Prior scar noted.  : No suprapubic tenderness  MSK: no edema   Psych: slow affect  Skin: Warm      LABS/STUDIES  --------------------------------------------------------------------------------              8.4    4.65  >-----------<  192      [12-30-24 @ 06:46]              26.3     140  |  101  |  21  ----------------------------<  167      [12-30-24 @ 06:46]  4.9   |  28  |  0.61        Ca     9.8     [12-30-24 @ 06:46]      iCa    1.31     [12-30 @ 06:46]      Mg     1.9     [12-30-24 @ 06:46]      Phos  2.6     [12-30-24 @ 06:46]    TPro  6.2  /  Alb  2.7  /  TBili  0.4  /  DBili  x   /  AST  26  /  ALT  13  /  AlkPhos  116  [12-30-24 @ 06:46]    PT/INR: PT 11.1 , INR 0.97       [12-30-24 @ 06:46]  PTT: 30.2       [12-30-24 @ 06:46]    Uric acid 4.3      [12-30-24 @ 06:46]        [12-30-24 @ 06:46]    Creatinine Trend:  SCr 0.61 [12-30 @ 06:46]  SCr 0.67 [12-29 @ 19:14]  SCr 0.82 [12-29 @ 07:07]  SCr 0.98 [12-28 @ 18:43]  SCr 0.73 [12-28 @ 10:06]    Tacrolimus (), Serum: 3.1 ng/mL (12-30 @ 06:47)  Tacrolimus (), Serum: 2.8 ng/mL (12-29 @ 07:07)  Tacrolimus (), Serum: 8.1 ng/mL (12-28 @ 10:06)      Urinalysis - [12-30-24 @ 06:46]      Color  / Appearance  / SG  / pH       Gluc 167 / Ketone   / Bili  / Urobili        Blood  / Protein  / Leuk Est  / Nitrite       RBC  / WBC  / Hyaline  / Gran  / Sq Epi  / Non Sq Epi  / Bacteria       PTH -- (Ca 11.2)      [12-28-24 @ 10:06]   14  Vitamin D (25OH) 39.8      [12-28-24 @ 10:06]  TSH 0.36      [12-28-24 @ 10:06]    HBsAb Reactive      [12-28-24 @ 18:43]  HBsAg Nonreact      [12-27-24 @ 13:43]  HBcAb Nonreact      [12-28-24 @ 18:43]  HCV 0.25, Nonreact      [12-27-24 @ 13:43]  HIV Nonreact      [12-27-24 @ 13:43]        IMAGING/RADIOLOGY: Reviewed.

## 2024-12-30 NOTE — PROGRESS NOTE ADULT - PROBLEM SELECTOR PLAN 5
on insulin glargine   35 U QHS and lispro 12 U QAC  12/28 currently on Lantus 10, no AC insulin, SSI low scale   - low 500, will increased lantus to 35, 12 U QAC, moderate QAC and HS SSI  12/28 endo consult requested  12/29 Hypoglycemic, lantus decreased to 30

## 2024-12-30 NOTE — ADVANCED PRACTICE NURSE CONSULT - RECOMMEDATIONS
Post procedure verbal instructions given to the patient or patient representative. Patient or patient representative  instructed regarding signs and symptoms of infection. Patient instructed to keep dressing dry and clean. Care for catheter as per hospital protocols  
Impression      Urinary incontinence     Bilateral sacrum/buttock evolving deep tissue injury, present on admission.  Penis Wound - Present on admission.      Recommendations      1. Bilateral sacrum/buttock - evolving deep tissue injury        Topical therapy- sacral/bilateral buttocks- cleanse w/incontinent cleanser, pat dry & apply Triad  twice daily & prn soiling . Monitor for changes     2. Penis Wound      Topical therapy - cleanse w/incontinent cleanser, pat dry & apply Triad  twice daily & prn soiling . Monitor for changes     3. Incontinent management - incontinent cleanser, pads, julio care BID     4. Maintain on an alternating air with low air loss surface      5. Turn & reposition every 2 hr; Use positioning pillow to turn and reposition, soft pillow between bony prominences; continue measures to decrease friction/shear/pressure.     6. Nutrition optimization.     7. Place waffle cushion when out of bed to chair.

## 2024-12-30 NOTE — ADVANCED PRACTICE NURSE CONSULT - REASON FOR CONSULT
Wound consult requested to assess Patient's skin status of sacrum - stage 2 and penis wounds  HPI: 69-year-old male with PMHx of DM, HTN, HLD, ESRD on HD s/p LKRT 2012 (from brother), hypothyroidism, recent admission to [facility] 11/30/24-12/18/24 for AMS found to have sacral OM and E. Coli bacteremia s/p treatment with meropenam till 12/10, complicated by hypercalcemia  (s/p calcitonin, aredia, and started on prednisone by nephrology), found to have new DLBCL on liver biopsy 12/16/24 transferred to [facility] from rehab to start chemo with  Mini R-CHOP. Treatment started 12/28. Hospital course complicated by hypercalcemia and steroid induced hyperglycemia. Pt has anemia and leukopenia from disease.  Wound consult requested to assess Patient's skin status of sacrum - stage 2 and penis wounds, present on admission.  HPI: 69-year-old male with PMHx of DM, HTN, HLD, ESRD on HD s/p LKRT 2012 (from brother), hypothyroidism, recent admission to [facility] 11/30/24-12/18/24 for AMS found to have sacral OM and E. Coli bacteremia s/p treatment with meropenam till 12/10, complicated by hypercalcemia  (s/p calcitonin, aredia, and started on prednisone by nephrology), found to have new DLBCL on liver biopsy 12/16/24 transferred to [facility] from rehab to start chemo with  Mini R-CHOP. Treatment started 12/28. Hospital course complicated by hypercalcemia and steroid induced hyperglycemia. Pt has anemia and leukopenia from disease.

## 2024-12-30 NOTE — PROGRESS NOTE ADULT - ASSESSMENT
69-year-old male with PMHx of DM, HTN, HLD, ESRD on HD s/p LKRT 2012 (from brother), hypothyroidism, recent admission to [facility] for AMS found to have sacral OM and E. Coli bacteremia s/p treatment with meropenam till 12/10, complicated by hypercalcemia  (s/p calcitonin, aredia, and started on prednisone by nephrology), found to have new DLBCL on liver biopsy transferred to [facility] to start R-CHOP. Transplant Nephrology consulted for post transplant management and IS.      1. s/p LKRT 2012 and L native nephrectomy.   Per Hillary ZEPEDA/[facility], pt. baseline SCR ~1.0. Pt. SCr is at baseline.   - U/A bland, small LE, no bacteria, 3 WBC.  - Monitor labs and I/Os. Avoid nephrotoxins including, ACE/ARB, NSAIDs, contrast, etc. Dose medications as per eGFR.     2. Immunosuppression  Pt. is on Tacrolimus 2mg AM/1mg PM, imuran 50mg BID, and prednisone 20mg for hypercalcemia.   - Stop tacrolimus, as pt. currently receiving cyclosporine as part of R-CHOP regimen.   - Hold imuran in setting of recent infection and chemotherapy.   - Can continue prednisone if needed for RCHOP therapy, however if no steroids need for RCHOP could discontinue.     3. Hypercalcemia  Calcium on admission to [facility] 11.8. Likely related to new DLBCL diagnosis.   - s/p calcitonin x2 12/28. Was previously given pamidronate x2 at [place] (12/6 and ?12/18)  - PTH low 14, Vitamin D 25-OH 39.8, 1,25-OH elevated at 101.   - Currently improved at 9.8 today.     If you have any questions, please feel free to contact me:  Sophia Cordoba MD PGY-4  Nephrology Fellow  [employer] Teams (Preferred)/ Pager [phone]   (After 5pm or on weekends please page the on-call fellow)      69-year-old male with PMHx of DM, HTN, HLD, ESRD on HD s/p LKRT 2012 (from brother), hypothyroidism, recent admission to [facility] for AMS found to have sacral OM and E. Coli bacteremia s/p treatment with meropenam till 12/10, complicated by hypercalcemia  (s/p calcitonin, aredia, and started on prednisone by nephrology), found to have new DLBCL on liver biopsy transferred to [facility] to start R-CHOP. Transplant Nephrology consulted for post transplant management and IS.      1. s/p LKRT 2012 and L native nephrectomy.   Per Hillary ZEPEDA/[facility], pt. baseline SCR ~1.0. Pt. SCr is at baseline.   - U/A bland, small LE, no bacteria, 3 WBC.  - Monitor labs and I/Os. Avoid nephrotoxins including, ACE/ARB, NSAIDs, contrast, etc. Dose medications as per eGFR.     2. Immunosuppression  Pt. is on Tacrolimus 2mg AM/1mg PM, imuran 50mg BID, and prednisone 20mg for hypercalcemia.   - Stop tacrolimus, as pt. currently receiving cytoxan as part of R-CHOP regimen.   - Hold imuran in setting of recent infection and chemotherapy.   - Can continue prednisone if needed for RCHOP therapy, however if no steroids need for RCHOP could discontinue.     3. Hypercalcemia  Calcium on admission to [facility] 11.8. Likely related to new DLBCL diagnosis.   - s/p calcitonin x2 12/28. Was previously given pamidronate x2 at [place] (12/6 and ?12/18)  - PTH low 14, Vitamin D 25-OH 39.8, 1,25-OH elevated at 101.   - Currently improved at 9.8 today.     If you have any questions, please feel free to contact me:  Sophia Cordoba MD PGY-4  Nephrology Fellow  [employer] Teams (Preferred)/ Pager [phone]   (After 5pm or on weekends please page the on-call fellow)

## 2024-12-30 NOTE — PROGRESS NOTE ADULT - SUBJECTIVE AND OBJECTIVE BOX
ENDOCRINE FOLLOW UP     Chief Complaint:  T2DM    History: glucose 200s today, s/p 30 units lantus last night. reports good appetite. corrected calcium 10.8 today    MEDICATIONS  (STANDING):  allopurinol 100 milliGRAM(s) Oral daily  amLODIPine   Tablet 10 milliGRAM(s) Oral daily  buPROPion XL (24-Hour) . 300 milliGRAM(s) Oral daily  carvedilol 12.5 milliGRAM(s) Oral every 12 hours  chlorhexidine 2% Cloths 1 Application(s) Topical <User Schedule>  dextrose 5%. 1000 milliLiter(s) (50 mL/Hr) IV Continuous <Continuous>  dextrose 5%. 1000 milliLiter(s) (50 mL/Hr) IV Continuous <Continuous>  dextrose 5%. 1000 milliLiter(s) (100 mL/Hr) IV Continuous <Continuous>  dextrose 50% Injectable 25 Gram(s) IV Push once  dextrose 50% Injectable 12.5 Gram(s) IV Push once  dextrose 50% Injectable 25 Gram(s) IV Push once  dextrose 50% Injectable 25 Gram(s) IV Push once  escitalopram 10 milliGRAM(s) Oral daily  glucagon  Injectable 1 milliGRAM(s) IntraMuscular once  heparin   Injectable 5000 Unit(s) SubCutaneous every 12 hours  hydrALAZINE 100 milliGRAM(s) Oral every 8 hours  insulin glargine Injectable (LANTUS) 30 Unit(s) SubCutaneous at bedtime  insulin lispro (ADMELOG) corrective regimen sliding scale   SubCutaneous three times a day before meals  insulin lispro (ADMELOG) corrective regimen sliding scale   SubCutaneous at bedtime  insulin lispro Injectable (ADMELOG) 10 Unit(s) SubCutaneous three times a day before meals  levothyroxine 88 MICROGram(s) Oral daily  lisinopril 20 milliGRAM(s) Oral daily  potassium chloride    Tablet ER 20 milliEquivalent(s) Oral two times a day  predniSONE   Tablet 80 milliGRAM(s) Oral every 24 hours  sodium chloride 0.9%. 1000 milliLiter(s) (100 mL/Hr) IV Continuous <Continuous>  tacrolimus 1 milliGRAM(s) Oral <User Schedule>  tacrolimus 2 milliGRAM(s) Oral <User Schedule>    MEDICATIONS  (PRN):  acetaminophen     Tablet .. 650 milliGRAM(s) Oral every 6 hours PRN Temp greater or equal to 38C (100.4F), Mild Pain (1 - 3)  baclofen 5 milliGRAM(s) Oral every 8 hours PRN Muscle Spasm  dextrose Oral Gel 15 Gram(s) Oral once PRN Blood Glucose LESS THAN 70 milliGRAM(s)/deciliter  metoclopramide Injectable 10 milliGRAM(s) IV Push every 6 hours PRN nausea/vomiting  polyethylene glycol 3350 17 Gram(s) Oral two times a day PRN Constipation  sodium chloride 0.9% lock flush 10 milliLiter(s) IV Push every 1 hour PRN Pre/post blood products, medications, blood draw, and to maintain line patency      Allergies    No Known Allergies    Intolerances        ROS: All other systems reviewed and negative    PHYSICAL EXAM:  VITALS: T(C): 36.7 (12-30-24 @ 13:15)  T(F): 98.1 (12-30-24 @ 13:15), Max: 98.4 (12-30-24 @ 09:40)  HR: 63 (12-30-24 @ 13:15) (60 - 65)  BP: 129/63 (12-30-24 @ 13:15) (129/63 - 179/68)  RR:  (18 - 18)  SpO2:  (96% - 98%)  Wt(kg): --  GENERAL: NAD, resting comfortably   EYES: No proptosis,  anicteric  HEENT:  Atraumatic, Normocephalic, moist mucous membranes  RESPIRATORY: Nonlabored respirations on room air, normal rate/effort   NEURO: AOx3, moves all extremities spontaenuously   PSYCH:  reactive affect, euthymic mood    POCT Blood Glucose.: 225 mg/dL (12-30-24 @ 12:05)  POCT Blood Glucose.: 230 mg/dL (12-30-24 @ 08:33)  POCT Blood Glucose.: 149 mg/dL (12-30-24 @ 02:15)  POCT Blood Glucose.: 169 mg/dL (12-29-24 @ 21:10)  POCT Blood Glucose.: 110 mg/dL (12-29-24 @ 17:01)  POCT Blood Glucose.: 106 mg/dL (12-29-24 @ 13:05)  POCT Blood Glucose.: 94 mg/dL (12-29-24 @ 12:45)  POCT Blood Glucose.: 76 mg/dL (12-29-24 @ 12:30)  POCT Blood Glucose.: 46 mg/dL (12-29-24 @ 12:07)  POCT Blood Glucose.: 66 mg/dL (12-29-24 @ 12:05)  POCT Blood Glucose.: 88 mg/dL (12-29-24 @ 09:16)  POCT Blood Glucose.: 321 mg/dL (12-28-24 @ 21:43)  POCT Blood Glucose.: 416 mg/dL (12-28-24 @ 18:23)  POCT Blood Glucose.: 514 mg/dL (12-28-24 @ 16:52)  POCT Blood Glucose.: 464 mg/dL (12-28-24 @ 16:49)  POCT Blood Glucose.: 364 mg/dL (12-28-24 @ 12:07)  POCT Blood Glucose.: 430 mg/dL (12-28-24 @ 12:06)  POCT Blood Glucose.: 335 mg/dL (12-28-24 @ 08:31)  POCT Blood Glucose.: 367 mg/dL (12-27-24 @ 21:33)  POCT Blood Glucose.: 323 mg/dL (12-27-24 @ 17:40)      12-30    140  |  101  |  21  ----------------------------<  167[H]  4.9   |  28  |  0.61    eGFR: 105    Ca    9.8      12-30  Mg     1.9     12-30  Phos  2.6     12-30    TPro  6.2  /  Alb  2.7[L]  /  TBili  0.4  /  DBili  x   /  AST  26  /  ALT  13  /  AlkPhos  116  12-30      A1C with Estimated Average Glucose Result: 7.8 % (12-29-24 @ 07:07)  A1C with Estimated Average Glucose Result: 7.7 % (12-28-24 @ 10:06)      Thyroid Stimulating Hormone, Serum: 0.36 uIU/mL (12-28-24 @ 10:06)

## 2024-12-30 NOTE — PROGRESS NOTE ADULT - PROBLEM SELECTOR PLAN 8
on amlodipine 10 mg daily, carvedilol 12.5 mg BID,, , lisinopril 20 mg daily  12/29 Hydralazine 50mg BID added, cont to monitor BP   Home clonidine 0.1 mg TID held, Hydralazine 100 mg TID

## 2024-12-30 NOTE — PROGRESS NOTE ADULT - SUBJECTIVE AND OBJECTIVE BOX
Diagnosis: DLBCL    Protocol/Chemo Regimen: mini R CHOP, Rituximab on day1, CHOP to start on day2  Day: 3     Pt endorsed: no complaint    Review of Systems: Patient denied nausea, vomiting, mouth pain,  chest pain, cough, dyspnea, abdominal pain, constipation, diarrhea, rectal pain,  rash, fatigue, headache, bleeding      Pain scale:       denies                                 Location: NA    Diet:  consistent carbo no snack    Allergies:     No Known Allergies      HEME/ONC MEDICATIONS  heparin   Injectable 5000 Unit(s) SubCutaneous every 12 hours  riTUXimab-pvvr (RUXIENCE) IVPB (eMAR) 743 milliGRAM(s) IV Intermittent once      STANDING MEDICATIONS  allopurinol 100 milliGRAM(s) Oral daily  amLODIPine   Tablet 10 milliGRAM(s) Oral daily  buPROPion XL (24-Hour) . 300 milliGRAM(s) Oral daily  calcitonin Injectable 310 International Unit(s) IntraMuscular every 12 hours  carvedilol 12.5 milliGRAM(s) Oral every 12 hours  dextrose 5%. 1000 milliLiter(s) IV Continuous <Continuous>  dextrose 5%. 1000 milliLiter(s) IV Continuous <Continuous>  dextrose 5%. 1000 milliLiter(s) IV Continuous <Continuous>  dextrose 50% Injectable 25 Gram(s) IV Push once  dextrose 50% Injectable 12.5 Gram(s) IV Push once  dextrose 50% Injectable 25 Gram(s) IV Push once  dextrose 50% Injectable 25 Gram(s) IV Push once  escitalopram 10 milliGRAM(s) Oral daily  glucagon  Injectable 1 milliGRAM(s) IntraMuscular once  insulin glargine Injectable (LANTUS) 10 Unit(s) SubCutaneous at bedtime  insulin lispro (ADMELOG) corrective regimen sliding scale   SubCutaneous three times a day before meals  insulin lispro (ADMELOG) corrective regimen sliding scale   SubCutaneous at bedtime  levothyroxine 88 MICROGram(s) Oral daily  lisinopril 20 milliGRAM(s) Oral daily  sodium chloride 0.9%. 1000 milliLiter(s) IV Continuous <Continuous>  tacrolimus 1 milliGRAM(s) Oral <User Schedule>  tacrolimus 2 milliGRAM(s) Oral <User Schedule>      PRN MEDICATIONS  acetaminophen     Tablet .. 650 milliGRAM(s) Oral every 6 hours PRN  dextrose Oral Gel 15 Gram(s) Oral once PRN  polyethylene glycol 3350 17 Gram(s) Oral two times a day PRN      Vital Signs Last 24 Hrs        PHYSICAL EXAM  General: adult in NAD  HEENT: clear oropharynx, anicteric sclera  CV: normal S1/S2 RRR  Lungs: positive air movement b/l ant lungs,clear to auscultation, no wheezes, no rales  Abdomen: soft, + BS,  non-tender non-distended, no hepatosplenomegaly  Ext: no edema  Skin: no rash  Neuro: alert and oriented X 3, no focal deficits  Central Line:  PICC CDI    LABS:              RADIOLOGY & ADDITIONAL STUDIES:    Xray Chest 1 View- PORTABLE-Urgent (Xray Chest 1 View- PORTABLE-Urgent .) (12.28.24 @ 14:48) >  FINDINGS:  Left-sided PICC line with tip in the SVC.  No focal consolidation.  There is no pneumothorax or pleural effusion.  No acute osseous abnormalities. Chronic left-sided rib fractures. Chronic   left humeral neck fracture.    IMPRESSION:  No focal consolidation.     Diagnosis: DLBCL    Protocol/Chemo Regimen: mini R CHOP, Rituximab on day1, CHOP to start on day2  Day: 3     Pt endorsed: no complaint    Review of Systems: Patient denied nausea, vomiting, mouth pain,  chest pain, cough, dyspnea, abdominal pain, constipation, diarrhea, rectal pain,  rash, fatigue, headache, bleeding      Pain scale:       denies                                 Location: NA    Diet:  consistent carbo no snack    Allergies:     No Known Allergies      HEME/ONC MEDICATIONS  heparin   Injectable 5000 Unit(s) SubCutaneous every 12 hours  riTUXimab-pvvr (RUXIENCE) IVPB (eMAR) 743 milliGRAM(s) IV Intermittent once      STANDING MEDICATIONS  allopurinol 100 milliGRAM(s) Oral daily  amLODIPine   Tablet 10 milliGRAM(s) Oral daily  buPROPion XL (24-Hour) . 300 milliGRAM(s) Oral daily  calcitonin Injectable 310 International Unit(s) IntraMuscular every 12 hours  carvedilol 12.5 milliGRAM(s) Oral every 12 hours  dextrose 5%. 1000 milliLiter(s) IV Continuous <Continuous>  dextrose 5%. 1000 milliLiter(s) IV Continuous <Continuous>  dextrose 5%. 1000 milliLiter(s) IV Continuous <Continuous>  dextrose 50% Injectable 25 Gram(s) IV Push once  dextrose 50% Injectable 12.5 Gram(s) IV Push once  dextrose 50% Injectable 25 Gram(s) IV Push once  dextrose 50% Injectable 25 Gram(s) IV Push once  escitalopram 10 milliGRAM(s) Oral daily  glucagon  Injectable 1 milliGRAM(s) IntraMuscular once  insulin glargine Injectable (LANTUS) 10 Unit(s) SubCutaneous at bedtime  insulin lispro (ADMELOG) corrective regimen sliding scale   SubCutaneous three times a day before meals  insulin lispro (ADMELOG) corrective regimen sliding scale   SubCutaneous at bedtime  levothyroxine 88 MICROGram(s) Oral daily  lisinopril 20 milliGRAM(s) Oral daily  sodium chloride 0.9%. 1000 milliLiter(s) IV Continuous <Continuous>  tacrolimus 1 milliGRAM(s) Oral <User Schedule>  tacrolimus 2 milliGRAM(s) Oral <User Schedule>      PRN MEDICATIONS  acetaminophen     Tablet .. 650 milliGRAM(s) Oral every 6 hours PRN  dextrose Oral Gel 15 Gram(s) Oral once PRN  polyethylene glycol 3350 17 Gram(s) Oral two times a day PRN      Vital Signs Last 24 Hrs  T(C): 36.7 (30 Dec 2024 13:15), Max: 36.9 (30 Dec 2024 09:40)  T(F): 98.1 (30 Dec 2024 13:15), Max: 98.4 (30 Dec 2024 09:40)  HR: 63 (30 Dec 2024 13:15) (60 - 65)  BP: 149/63 (30 Dec 2024 15:38) (129/63 - 179/68)  RR: 18 (30 Dec 2024 13:15) (18 - 18)  SpO2: 98% (30 Dec 2024 13:15) (96% - 98%)    Parameters below as of 30 Dec 2024 13:15  Patient On (Oxygen Delivery Method): room air    PHYSICAL EXAM  General: adult in NAD  HEENT: clear oropharynx, anicteric sclera  CV: normal S1/S2 RRR  Lungs: positive air movement b/l ant lungs,clear to auscultation, no wheezes, no rales  Abdomen: soft, + BS,  non-tender non-distended, no hepatosplenomegaly  Ext: no edema  Skin: no rash  Neuro: alert and oriented X 3, no focal deficits  Central Line:  PICC CDI    LABS:                                8.4    4.65  )-----------( 192      ( 30 Dec 2024 06:46 )             26.3     Mean Cell Volume : 91.3 fl  Mean Cell Hemoglobin : 29.2 pg  Mean Cell Hemoglobin Concentration : 31.9 g/dL  Auto Neutrophil # : 4.34 K/uL  Auto Lymphocyte # : 0.12 K/uL  Auto Monocyte # : 0.16 K/uL  Auto Eosinophil # : 0.00 K/uL  Auto Basophil # : 0.00 K/uL  Auto Neutrophil % : 93.4 %  Auto Lymphocyte % : 2.6 %  Auto Monocyte % : 3.4 %  Auto Eosinophil % : 0.0 %  Auto Basophil % : 0.0 %    12-30    140  |  101  |  21  ----------------------------<  167[H]  4.9   |  28  |  0.61    Ca    9.8      30 Dec 2024 06:46  Phos  2.6     12-30  Mg     1.9     12-30    TPro  6.2  /  Alb  2.7[L]  /  TBili  0.4  /  DBili  x   /  AST  26  /  ALT  13  /  AlkPhos  116  12-30      PT/INR - ( 30 Dec 2024 06:46 )   PT: 11.1 sec;   INR: 0.97 ratio    PTT - ( 30 Dec 2024 06:46 )  PTT:30.2 sec      Uric Acid 4.3      Uric Acid 3.9    RADIOLOGY & ADDITIONAL STUDIES:    Xray Chest 1 View- PORTABLE-Urgent (Xray Chest 1 View- PORTABLE-Urgent .) (12.28.24 @ 14:48) >  FINDINGS:  Left-sided PICC line with tip in the SVC.  No focal consolidation.  There is no pneumothorax or pleural effusion.  No acute osseous abnormalities. Chronic left-sided rib fractures. Chronic   left humeral neck fracture.    IMPRESSION:  No focal consolidation.

## 2024-12-31 ENCOUNTER — RESULT REVIEW (OUTPATIENT)
Age: 69
End: 2024-12-31

## 2024-12-31 LAB
ACETONE, GCMS SCREEN URN: SIGNIFICANT CHANGE UP
ADD ON TEST-SPECIMEN IN LAB: SIGNIFICANT CHANGE UP
ALBUMIN SERPL ELPH-MCNC: 2.5 G/DL — LOW (ref 3.3–5)
ALP SERPL-CCNC: 90 U/L — SIGNIFICANT CHANGE UP (ref 40–120)
ALT FLD-CCNC: 13 U/L — SIGNIFICANT CHANGE UP (ref 10–45)
ANION GAP SERPL CALC-SCNC: 10 MMOL/L — SIGNIFICANT CHANGE UP (ref 5–17)
ANISOCYTOSIS BLD QL: SIGNIFICANT CHANGE UP
APPEARANCE CSF: CLEAR — SIGNIFICANT CHANGE UP
APPEARANCE UR: ABNORMAL
APTT BLD: 27.9 SEC — SIGNIFICANT CHANGE UP (ref 24.5–35.6)
AST SERPL-CCNC: 15 U/L — SIGNIFICANT CHANGE UP (ref 10–40)
BACTERIA # UR AUTO: NEGATIVE /HPF — SIGNIFICANT CHANGE UP
BASOPHILS # BLD AUTO: 0 K/UL — SIGNIFICANT CHANGE UP (ref 0–0.2)
BASOPHILS NFR BLD AUTO: 0 % — SIGNIFICANT CHANGE UP (ref 0–2)
BILIRUB SERPL-MCNC: 0.3 MG/DL — SIGNIFICANT CHANGE UP (ref 0.2–1.2)
BILIRUB UR-MCNC: NEGATIVE — SIGNIFICANT CHANGE UP
BLD GP AB SCN SERPL QL: NEGATIVE — SIGNIFICANT CHANGE UP
BUN SERPL-MCNC: 25 MG/DL — HIGH (ref 7–23)
BURR CELLS BLD QL SMEAR: PRESENT — SIGNIFICANT CHANGE UP
CA-I BLD-SCNC: 1.17 MMOL/L — SIGNIFICANT CHANGE UP (ref 1.15–1.33)
CALCIUM SERPL-MCNC: 8.1 MG/DL — LOW (ref 8.4–10.5)
CAST: 0 /LPF — SIGNIFICANT CHANGE UP (ref 0–4)
CHLORIDE SERPL-SCNC: 108 MMOL/L — SIGNIFICANT CHANGE UP (ref 96–108)
CO2 SERPL-SCNC: 23 MMOL/L — SIGNIFICANT CHANGE UP (ref 22–31)
COLOR CSF: SIGNIFICANT CHANGE UP
COLOR SPEC: YELLOW — SIGNIFICANT CHANGE UP
CREAT SERPL-MCNC: 0.64 MG/DL — SIGNIFICANT CHANGE UP (ref 0.5–1.3)
D DIMER BLD IA.RAPID-MCNC: 1027 NG/ML DDU — HIGH
DACRYOCYTES BLD QL SMEAR: SLIGHT — SIGNIFICANT CHANGE UP
DIFF PNL FLD: NEGATIVE — SIGNIFICANT CHANGE UP
EGFR: 104 ML/MIN/1.73M2 — SIGNIFICANT CHANGE UP
EOSINOPHIL # BLD AUTO: 0 K/UL — SIGNIFICANT CHANGE UP (ref 0–0.5)
EOSINOPHIL NFR BLD AUTO: 0 % — SIGNIFICANT CHANGE UP (ref 0–6)
FIBRINOGEN PPP-MCNC: 225 MG/DL — SIGNIFICANT CHANGE UP (ref 200–445)
GLUCOSE BLDC GLUCOMTR-MCNC: 183 MG/DL — HIGH (ref 70–99)
GLUCOSE BLDC GLUCOMTR-MCNC: 201 MG/DL — HIGH (ref 70–99)
GLUCOSE BLDC GLUCOMTR-MCNC: 208 MG/DL — HIGH (ref 70–99)
GLUCOSE BLDC GLUCOMTR-MCNC: 265 MG/DL — HIGH (ref 70–99)
GLUCOSE BLDC GLUCOMTR-MCNC: 285 MG/DL — HIGH (ref 70–99)
GLUCOSE BLDC GLUCOMTR-MCNC: 315 MG/DL — HIGH (ref 70–99)
GLUCOSE BLDC GLUCOMTR-MCNC: 316 MG/DL — HIGH (ref 70–99)
GLUCOSE CSF-MCNC: 154 MG/DL — HIGH (ref 40–70)
GLUCOSE SERPL-MCNC: 257 MG/DL — HIGH (ref 70–99)
GLUCOSE UR QL: 500 MG/DL
HCT VFR BLD CALC: 20.3 % — CRITICAL LOW (ref 39–50)
HGB BLD-MCNC: 6.4 G/DL — CRITICAL LOW (ref 13–17)
INR BLD: 0.97 RATIO — SIGNIFICANT CHANGE UP (ref 0.85–1.16)
ISOPROPANOL GCMS: SIGNIFICANT CHANGE UP MG/DL
KETONES UR-MCNC: NEGATIVE MG/DL — SIGNIFICANT CHANGE UP
LDH CSF L TO P-CCNC: 16 U/L — SIGNIFICANT CHANGE UP
LDH FLD-CCNC: 16 U/L — SIGNIFICANT CHANGE UP
LDH SERPL L TO P-CCNC: 283 U/L — HIGH (ref 50–242)
LEUKOCYTE ESTERASE UR-ACNC: ABNORMAL
LYMPHOCYTES # BLD AUTO: 0.07 K/UL — LOW (ref 1–3.3)
LYMPHOCYTES # BLD AUTO: 1.8 % — LOW (ref 13–44)
MACROCYTES BLD QL: SLIGHT — SIGNIFICANT CHANGE UP
MAGNESIUM SERPL-MCNC: 1.5 MG/DL — LOW (ref 1.6–2.6)
MANUAL SMEAR VERIFICATION: SIGNIFICANT CHANGE UP
MCHC RBC-ENTMCNC: 29.8 PG — SIGNIFICANT CHANGE UP (ref 27–34)
MCHC RBC-ENTMCNC: 31.5 G/DL — LOW (ref 32–36)
MCV RBC AUTO: 94.4 FL — SIGNIFICANT CHANGE UP (ref 80–100)
MONOCYTES # BLD AUTO: 0.03 K/UL — SIGNIFICANT CHANGE UP (ref 0–0.9)
MONOCYTES NFR BLD AUTO: 0.9 % — LOW (ref 2–14)
NEUTROPHILS # BLD AUTO: 3.67 K/UL — SIGNIFICANT CHANGE UP (ref 1.8–7.4)
NEUTROPHILS # CSF: SIGNIFICANT CHANGE UP (ref 0–6)
NEUTROPHILS NFR BLD AUTO: 97.3 % — HIGH (ref 43–77)
NITRITE UR-MCNC: NEGATIVE — SIGNIFICANT CHANGE UP
NRBC NFR CSF: <1 /UL — SIGNIFICANT CHANGE UP (ref 0–5)
OVALOCYTES BLD QL SMEAR: SLIGHT — SIGNIFICANT CHANGE UP
PH UR: 7 — SIGNIFICANT CHANGE UP (ref 5–8)
PHOSPHATE SERPL-MCNC: 2.7 MG/DL — SIGNIFICANT CHANGE UP (ref 2.5–4.5)
PLAT MORPH BLD: NORMAL — SIGNIFICANT CHANGE UP
PLATELET # BLD AUTO: 144 K/UL — LOW (ref 150–400)
POIKILOCYTOSIS BLD QL AUTO: SIGNIFICANT CHANGE UP
POTASSIUM SERPL-MCNC: 4.1 MMOL/L — SIGNIFICANT CHANGE UP (ref 3.5–5.3)
POTASSIUM SERPL-SCNC: 4.1 MMOL/L — SIGNIFICANT CHANGE UP (ref 3.5–5.3)
PROT CSF-MCNC: 40 MG/DL — SIGNIFICANT CHANGE UP (ref 15–45)
PROT SERPL-MCNC: 5.1 G/DL — LOW (ref 6–8.3)
PROT UR-MCNC: NEGATIVE MG/DL — SIGNIFICANT CHANGE UP
PROTHROM AB SERPL-ACNC: 11.2 SEC — SIGNIFICANT CHANGE UP (ref 9.9–13.4)
RBC # BLD: 2.15 M/UL — LOW (ref 4.2–5.8)
RBC # CSF: 1 /UL — HIGH (ref 0–0)
RBC # FLD: 18.8 % — HIGH (ref 10.3–14.5)
RBC BLD AUTO: ABNORMAL
RBC CASTS # UR COMP ASSIST: 34 /HPF — HIGH (ref 0–4)
REVIEW: SIGNIFICANT CHANGE UP
RH IG SCN BLD-IMP: POSITIVE — SIGNIFICANT CHANGE UP
SCHISTOCYTES BLD QL AUTO: SLIGHT — SIGNIFICANT CHANGE UP
SODIUM SERPL-SCNC: 141 MMOL/L — SIGNIFICANT CHANGE UP (ref 135–145)
SP GR SPEC: 1.02 — SIGNIFICANT CHANGE UP (ref 1–1.03)
SQUAMOUS # UR AUTO: 0 /HPF — SIGNIFICANT CHANGE UP (ref 0–5)
TARGETS BLD QL SMEAR: SLIGHT — SIGNIFICANT CHANGE UP
TOXIC GRANULES BLD QL SMEAR: PRESENT — SIGNIFICANT CHANGE UP
TUBE TYPE: SIGNIFICANT CHANGE UP
URATE SERPL-MCNC: 3.8 MG/DL — SIGNIFICANT CHANGE UP (ref 3.4–8.8)
UROBILINOGEN FLD QL: 1 MG/DL — SIGNIFICANT CHANGE UP (ref 0.2–1)
WBC # BLD: 3.77 K/UL — LOW (ref 3.8–10.5)
WBC # FLD AUTO: 3.77 K/UL — LOW (ref 3.8–10.5)
WBC UR QL: 16 /HPF — HIGH (ref 0–5)
YEAST-LIKE CELLS: PRESENT

## 2024-12-31 PROCEDURE — 96450 CHEMOTHERAPY INTO CNS: CPT

## 2024-12-31 PROCEDURE — 88189 FLOWCYTOMETRY/READ 16 & >: CPT

## 2024-12-31 PROCEDURE — 99233 SBSQ HOSP IP/OBS HIGH 50: CPT | Mod: FS

## 2024-12-31 PROCEDURE — 88108 CYTOPATH CONCENTRATE TECH: CPT | Mod: 26

## 2024-12-31 PROCEDURE — 99232 SBSQ HOSP IP/OBS MODERATE 35: CPT

## 2024-12-31 RX ORDER — THIAMINE HYDROCHLORIDE 100 MG/ML
100 INJECTION, SOLUTION INTRAMUSCULAR; INTRAVENOUS DAILY
Refills: 0 | Status: DISCONTINUED | OUTPATIENT
Start: 2025-01-01 | End: 2025-01-02

## 2024-12-31 RX ORDER — INSULIN LISPRO 100/ML
18 VIAL (ML) SUBCUTANEOUS
Refills: 0 | Status: DISCONTINUED | OUTPATIENT
Start: 2024-12-31 | End: 2025-01-02

## 2024-12-31 RX ORDER — METHOTREXATE 25 MG/ML
15 INJECTION, SOLUTION INTRA-ARTERIAL; INTRAMUSCULAR; INTRATHECAL; INTRAVENOUS ONCE
Refills: 0 | Status: DISCONTINUED | OUTPATIENT
Start: 2024-12-31 | End: 2025-01-02

## 2024-12-31 RX ORDER — INSULIN GLARGINE-YFGN 100 [IU]/ML
34 INJECTION, SOLUTION SUBCUTANEOUS AT BEDTIME
Refills: 0 | Status: DISCONTINUED | OUTPATIENT
Start: 2024-12-31 | End: 2025-01-02

## 2024-12-31 RX ORDER — THIAMINE HYDROCHLORIDE 100 MG/ML
500 INJECTION, SOLUTION INTRAMUSCULAR; INTRAVENOUS ONCE
Refills: 0 | Status: COMPLETED | OUTPATIENT
Start: 2024-12-31 | End: 2024-12-31

## 2024-12-31 RX ORDER — MAGNESIUM SULFATE 500 MG/ML
2 INJECTION, SOLUTION INTRAMUSCULAR; INTRAVENOUS ONCE
Refills: 0 | Status: COMPLETED | OUTPATIENT
Start: 2024-12-31 | End: 2024-12-31

## 2024-12-31 RX ORDER — HEPARIN SODIUM 1000 [USP'U]/ML
5000 INJECTION, SOLUTION INTRAVENOUS; SUBCUTANEOUS EVERY 12 HOURS
Refills: 0 | Status: DISCONTINUED | OUTPATIENT
Start: 2025-01-01 | End: 2025-01-02

## 2024-12-31 RX ORDER — PEGFILGRASTIM-APGF 6 MG/.6ML
6 INJECTION, SOLUTION SUBCUTANEOUS ONCE
Refills: 0 | Status: COMPLETED | OUTPATIENT
Start: 2025-01-01 | End: 2025-01-01

## 2024-12-31 RX ADMIN — CARVEDILOL 12.5 MILLIGRAM(S): 25 TABLET, FILM COATED ORAL at 05:48

## 2024-12-31 RX ADMIN — CHLORHEXIDINE GLUCONATE 1 APPLICATION(S): 1.2 RINSE ORAL at 08:48

## 2024-12-31 RX ADMIN — Medication 14 UNIT(S): at 15:37

## 2024-12-31 RX ADMIN — HYDRALAZINE HYDROCHLORIDE 100 MILLIGRAM(S): 10 TABLET ORAL at 21:25

## 2024-12-31 RX ADMIN — LISINOPRIL 20 MILLIGRAM(S): 30 TABLET ORAL at 05:48

## 2024-12-31 RX ADMIN — ESCITALOPRAM OXALATE 10 MILLIGRAM(S): 10 TABLET ORAL at 11:42

## 2024-12-31 RX ADMIN — Medication 6: at 09:00

## 2024-12-31 RX ADMIN — Medication 18 UNIT(S): at 18:20

## 2024-12-31 RX ADMIN — HYDRALAZINE HYDROCHLORIDE 100 MILLIGRAM(S): 10 TABLET ORAL at 05:48

## 2024-12-31 RX ADMIN — INSULIN GLARGINE-YFGN 34 UNIT(S): 100 INJECTION, SOLUTION SUBCUTANEOUS at 21:25

## 2024-12-31 RX ADMIN — Medication 14 UNIT(S): at 08:45

## 2024-12-31 RX ADMIN — THIAMINE HYDROCHLORIDE 105 MILLIGRAM(S): 100 INJECTION, SOLUTION INTRAMUSCULAR; INTRAVENOUS at 21:36

## 2024-12-31 RX ADMIN — BACLOFEN 5 MILLIGRAM(S): 10 TABLET ORAL at 23:12

## 2024-12-31 RX ADMIN — MAGNESIUM SULFATE 25 GRAM(S): 500 INJECTION, SOLUTION INTRAMUSCULAR; INTRAVENOUS at 08:45

## 2024-12-31 RX ADMIN — Medication 4: at 15:34

## 2024-12-31 RX ADMIN — BUPROPION HYDROCHLORIDE 300 MILLIGRAM(S): 150 TABLET, EXTENDED RELEASE ORAL at 11:41

## 2024-12-31 RX ADMIN — POTASSIUM CHLORIDE 20 MILLIEQUIVALENT(S): 600 TABLET, FILM COATED, EXTENDED RELEASE ORAL at 05:46

## 2024-12-31 RX ADMIN — HYDRALAZINE HYDROCHLORIDE 100 MILLIGRAM(S): 10 TABLET ORAL at 15:35

## 2024-12-31 RX ADMIN — Medication 10 MILLIGRAM(S): at 05:47

## 2024-12-31 RX ADMIN — ALLOPURINOL 100 MILLIGRAM(S): 100 TABLET ORAL at 11:42

## 2024-12-31 RX ADMIN — HEPARIN SODIUM 5000 UNIT(S): 1000 INJECTION, SOLUTION INTRAVENOUS; SUBCUTANEOUS at 05:47

## 2024-12-31 RX ADMIN — SODIUM CHLORIDE 20 MILLILITER(S): 9 INJECTION, SOLUTION INTRAMUSCULAR; INTRAVENOUS; SUBCUTANEOUS at 09:19

## 2024-12-31 RX ADMIN — Medication 80 MILLIGRAM(S): at 11:40

## 2024-12-31 RX ADMIN — POTASSIUM CHLORIDE 20 MILLIEQUIVALENT(S): 600 TABLET, FILM COATED, EXTENDED RELEASE ORAL at 18:27

## 2024-12-31 RX ADMIN — SODIUM CHLORIDE 100 MILLILITER(S): 9 INJECTION, SOLUTION INTRAMUSCULAR; INTRAVENOUS; SUBCUTANEOUS at 05:47

## 2024-12-31 RX ADMIN — LEVOTHYROXINE SODIUM 88 MICROGRAM(S): 175 TABLET ORAL at 05:47

## 2024-12-31 RX ADMIN — CARVEDILOL 12.5 MILLIGRAM(S): 25 TABLET, FILM COATED ORAL at 18:27

## 2024-12-31 NOTE — PROGRESS NOTE ADULT - SUBJECTIVE AND OBJECTIVE BOX
ENDOCRINE FOLLOW UP     Chief Complaint: T2DM    History: glucose uncontrolled, pt seen at bedside, not speaking much today or answering questions. just got back from procedure    MEDICATIONS  (STANDING):  allopurinol 100 milliGRAM(s) Oral daily  amLODIPine   Tablet 10 milliGRAM(s) Oral daily  buPROPion XL (24-Hour) . 300 milliGRAM(s) Oral daily  carvedilol 12.5 milliGRAM(s) Oral every 12 hours  chlorhexidine 2% Cloths 1 Application(s) Topical <User Schedule>  dextrose 5%. 1000 milliLiter(s) (50 mL/Hr) IV Continuous <Continuous>  dextrose 5%. 1000 milliLiter(s) (50 mL/Hr) IV Continuous <Continuous>  dextrose 5%. 1000 milliLiter(s) (100 mL/Hr) IV Continuous <Continuous>  dextrose 50% Injectable 25 Gram(s) IV Push once  dextrose 50% Injectable 12.5 Gram(s) IV Push once  dextrose 50% Injectable 25 Gram(s) IV Push once  dextrose 50% Injectable 25 Gram(s) IV Push once  escitalopram 10 milliGRAM(s) Oral daily  glucagon  Injectable 1 milliGRAM(s) IntraMuscular once  hydrALAZINE 100 milliGRAM(s) Oral every 8 hours  insulin glargine Injectable (LANTUS) 30 Unit(s) SubCutaneous at bedtime  insulin lispro (ADMELOG) corrective regimen sliding scale   SubCutaneous three times a day before meals  insulin lispro (ADMELOG) corrective regimen sliding scale   SubCutaneous at bedtime  insulin lispro Injectable (ADMELOG) 14 Unit(s) SubCutaneous three times a day before meals  levothyroxine 88 MICROGram(s) Oral daily  lisinopril 20 milliGRAM(s) Oral daily  methotrexate PF IntraThecal (eMAR) 15 milliGRAM(s) IntraThecal once  potassium chloride    Tablet ER 20 milliEquivalent(s) Oral two times a day  predniSONE   Tablet 80 milliGRAM(s) Oral every 24 hours  sodium chloride 0.9%. 1000 milliLiter(s) (20 mL/Hr) IV Continuous <Continuous>    MEDICATIONS  (PRN):  acetaminophen     Tablet .. 650 milliGRAM(s) Oral every 6 hours PRN Temp greater or equal to 38C (100.4F), Mild Pain (1 - 3)  baclofen 5 milliGRAM(s) Oral every 8 hours PRN Muscle Spasm  dextrose Oral Gel 15 Gram(s) Oral once PRN Blood Glucose LESS THAN 70 milliGRAM(s)/deciliter  metoclopramide Injectable 10 milliGRAM(s) IV Push every 6 hours PRN nausea/vomiting  polyethylene glycol 3350 17 Gram(s) Oral two times a day PRN Constipation  sodium chloride 0.9% lock flush 10 milliLiter(s) IV Push every 1 hour PRN Pre/post blood products, medications, blood draw, and to maintain line patency      Allergies    No Known Allergies    Intolerances        ROS: All other systems reviewed and negative    PHYSICAL EXAM:  VITALS: T(C): 36.6 (12-31-24 @ 10:22)  T(F): 97.9 (12-31-24 @ 10:22), Max: 99 (12-31-24 @ 01:26)  HR: 63 (12-31-24 @ 10:22) (60 - 70)  BP: 148/64 (12-31-24 @ 10:22) (148/64 - 187/68)  RR:  (18 - 18)  SpO2:  (97% - 99%)  Wt(kg): --  GENERAL: NAD, resting comfortably   EYES: No proptosis,  anicteric  HEENT:  Atraumatic, Normocephalic, moist mucous membranes  RESPIRATORY: Nonlabored respirations on room air, normal rate/effort   NEURO: AOx3, moves all extremities spontaenuously   PSYCH:  reactive affect, euthymic mood    POCT Blood Glucose.: 201 mg/dL (12-31-24 @ 15:16)  POCT Blood Glucose.: 265 mg/dL (12-31-24 @ 12:21)  POCT Blood Glucose.: 285 mg/dL (12-31-24 @ 08:23)  POCT Blood Glucose.: 316 mg/dL (12-31-24 @ 01:18)  POCT Blood Glucose.: 315 mg/dL (12-31-24 @ 01:15)  POCT Blood Glucose.: 301 mg/dL (12-30-24 @ 21:10)  POCT Blood Glucose.: 323 mg/dL (12-30-24 @ 17:15)  POCT Blood Glucose.: 225 mg/dL (12-30-24 @ 12:05)  POCT Blood Glucose.: 230 mg/dL (12-30-24 @ 08:33)  POCT Blood Glucose.: 149 mg/dL (12-30-24 @ 02:15)  POCT Blood Glucose.: 169 mg/dL (12-29-24 @ 21:10)  POCT Blood Glucose.: 110 mg/dL (12-29-24 @ 17:01)  POCT Blood Glucose.: 106 mg/dL (12-29-24 @ 13:05)  POCT Blood Glucose.: 94 mg/dL (12-29-24 @ 12:45)  POCT Blood Glucose.: 76 mg/dL (12-29-24 @ 12:30)  POCT Blood Glucose.: 46 mg/dL (12-29-24 @ 12:07)  POCT Blood Glucose.: 66 mg/dL (12-29-24 @ 12:05)  POCT Blood Glucose.: 88 mg/dL (12-29-24 @ 09:16)  POCT Blood Glucose.: 321 mg/dL (12-28-24 @ 21:43)  POCT Blood Glucose.: 416 mg/dL (12-28-24 @ 18:23)  POCT Blood Glucose.: 514 mg/dL (12-28-24 @ 16:52)  POCT Blood Glucose.: 464 mg/dL (12-28-24 @ 16:49)      12-31    141  |  108  |  25[H]  ----------------------------<  257[H]  4.1   |  23  |  0.64    eGFR: 104    Ca    8.1[L]      12-31  Mg     1.5     12-31  Phos  2.7     12-31    TPro  5.1[L]  /  Alb  2.5[L]  /  TBili  0.3  /  DBili  x   /  AST  15  /  ALT  13  /  AlkPhos  90  12-31      A1C with Estimated Average Glucose Result: 7.8 % (12-29-24 @ 07:07)  A1C with Estimated Average Glucose Result: 7.7 % (12-28-24 @ 10:06)      Thyroid Stimulating Hormone, Serum: 0.36 uIU/mL (12-28-24 @ 10:06)

## 2024-12-31 NOTE — PROGRESS NOTE ADULT - ASSESSMENT
The patient is a 69y Male with PMH of T2DM, hypothyroidism, ESRD s/p renal transplant who was transferred to this hospital for treatment of DLBCL.  Endocrinology consulted for hyperglycemia    #Type 2 Diabetes Mellitus complicated by neuropathy with  #Steroid-induced hyperglycemia  - Follows with: Dr. Romero in [place]  - A1C with Estimated Average Glucose Result: 7.8 % (12-29-24)  - home regimen: glargine 15 units qhs, lispro 15 units TIDAC  - eGFR: 96 mL/min/1.73m2 (12-29-24)  - Weight (kg): 76.4 (12-27-24)  - glucose elevated, no evidence of DKA on labs  - Of note, patient received  mg yesterday, prednisone 80 mg this morning.       INPATIENT PLAN:  - Recommend c/w Lantus 30 units QHS   - Recommend increase Admelog to *** units TID before meals (HOLD if NPO or not eating)  - Recommend c/w moderate dose admelog correction scale TIDQAC and separate moderate dose scale QHS  - Please check FSG before meals and QHS, or q6h while NPO  - Inpatient glucose goals: 100-180  - consistent carb diet when eating      DISCHARGE PLANNING:  - Discharge recs pending clinical course, continue basal/bolus insulin  - will need Endocrinology follow up with his provider Dr. Romero    #Hypothyroidism  - TSH 0.36, tT3 34, tT4 6.3, free T4 1.3  - continue levothyroxine 88 mcg daily    #Hypercalcemia, non-PTH mediated  D1,25OH elevated, likely hypercalcemia of granulomatous disease secondary to DLBCL which is treated with steroids  - corrected calcium 10.8 today (12/30), decreasing  - S/p calcitonin X2 on 12/28 and 12/29   - Continue prednisone  - No indication for bisphosphonate therapy to treat hypercalcemia at this time    #Hyperlipidemia  - LDL goal <70  - on crestor 10 mg daily at home  - check lipid panel as outpatient on a yearly basis    Ifeanyi Anthony MD  Endocrine Fellow  For nonurgent matters, please email [email or web address] or [email or web address]. For urgent matters only, please call answering service at [phone] (weekdays), [phone] (nights/weekends).    The patient is a 69y Male with PMH of T2DM, hypothyroidism, ESRD s/p renal transplant who was transferred to this hospital for treatment of DLBCL.  Endocrinology consulted for hyperglycemia    #Type 2 Diabetes Mellitus complicated by neuropathy with  #Steroid-induced hyperglycemia  - Follows with: Dr. Romero in [place]  - A1C with Estimated Average Glucose Result: 7.8 % (12-29-24)  - home regimen: glargine 15 units qhs, lispro 15 units TIDAC  - eGFR: 96 mL/min/1.73m2 (12-29-24)  - Weight (kg): 76.4 (12-27-24)  - glucose elevated, no evidence of DKA on labs  - Of note, patient received  mg yesterday, prednisone 80 mg this morning.       INPATIENT PLAN:  - Recommend increase Lantus to 34 units QHS   - Recommend increase Admelog to 18 units TID before meals (HOLD if NPO or not eating)  - Recommend c/w moderate dose admelog correction scale TIDQAC and separate moderate dose scale QHS  - Please check FSG before meals and QHS, or q6h while NPO  - Inpatient glucose goals: 100-180  - consistent carb diet when eating      DISCHARGE PLANNING:  - Discharge recs pending clinical course, continue basal/bolus insulin  - will need Endocrinology follow up with his provider Dr. Romero    #Hypothyroidism  - TSH 0.36, tT3 34, tT4 6.3, free T4 1.3  - continue levothyroxine 88 mcg daily    #Hypercalcemia, non-PTH mediated  D1,25OH elevated, likely hypercalcemia of granulomatous disease secondary to DLBCL which is treated with steroids  - corrected calcium 9.3 today (12/31), decreasing  - S/p calcitonin X2 on 12/28 and 12/29   - Continue prednisone  - No indication for bisphosphonate therapy to treat hypercalcemia at this time    #Hyperlipidemia  - LDL goal <70  - on crestor 10 mg daily at home  - check lipid panel as outpatient on a yearly basis    Ifeanyi Anthony MD  Endocrine Fellow  For nonurgent matters, please email [email or web address] or [email or web address]. For urgent matters only, please call answering service at [phone] (weekdays), [phone] (nights/weekends).

## 2024-12-31 NOTE — PROGRESS NOTE ADULT - ATTENDING COMMENTS
69-year-old man with ESRD s/p LRD from brother in 2012 excellent graft function  PMH of DM, HTN, HLD, recent admission for sacral OM and E.coli bacteremia s/p meropenem until 12/10  Presented with severe hypercalcemia, diagnosed with diffuse large B cell lymphoma on liver biopsy transferred from [facility]   Started on R-CHOP rx.   Renal allograft function remains stable   Hypercalcemia due to lymphoma, 1,25 OH2 elevated - now improved s/p pamidronate   Would stop Tacrolimus while patient is receiving R-CHOP
Patient is a 69-year-old woman with history of diabetes [place], end-stage renal disease status post renal transplant, transferred for management of diffuse large B-cell lymphoma currently on mini R-CHOP therapy.  Insulin regimen, adjust to basal bolus as above.  For hypothyroidism, continue levothyroxine 88 mcg once daily.  Non-PTH mediated hypercalcemia, currently receiving steroid regimen.  This is mediated by 1 25D hydroxy vitamin D.
69-year-old man with history of type 2 diabetes, hypothyroidism, ESRD s/p renal transplant, presenting for treatment of diffuse large B-cell lymphoma.  Patient with steroid-induced hyperglycemia as part of the R-CHOP therapy.  Recommend adjusting basal bolus insulin regimen as above.  Calcium level has been normalizing after treatment with prednisone 80 mg once daily.  Continue to monitor.

## 2024-12-31 NOTE — PROVIDER CONTACT NOTE (OTHER) - ASSESSMENT
pt /73 and HR 58
pt. a & o x 3, no s/s of hypoglycemia
pt BP is 181/76, pt is asymptomatic
no distress noted upon assessment, asymptomatic
no distress noted upon assessment, asymptomatic
Pt. a & o x 3, drowsy, refusing to eat. current BG is 110.
no distress noted upon assessment, asymptomatic
pt blood sugar is 367, asymptomatic, no sliding scale is ordered, due to receive lantus
Patient A&Ox4, Pt denies pain/discomfort, chest pain, SOB, N/V/D, afebrile. No acute distress noted.

## 2024-12-31 NOTE — PROGRESS NOTE ADULT - ASSESSMENT
69-year-old male with PMHx of DM, HTN, HLD, ESRD on HD s/p LKRT 2012 (from brother), hypothyroidism, recent admission to [facility] 11/30/24-12/18/24 for AMS found to have sacral OM and E. Coli bacteremia s/p treatment with meropenam till 12/10, complicated by hypercalcemia  (s/p calcitonin, aredia, and started on prednisone by nephrology), found to have new DLBCL on liver biopsy 12/16/24 transferred to [facility] from rehab to start chemo with  Mini R-CHOP. Treatment started 12/28. Hospital course complicated by hypercalcemia and steroid induced hyperglycemia. Pt has anemia and leukopenia from disease.

## 2024-12-31 NOTE — PROGRESS NOTE ADULT - SUBJECTIVE AND OBJECTIVE BOX
Diagnosis: DLBCL    Protocol/Chemo Regimen: cycle 1- R-miniCHOP, Rituximab on day1, CHOP to start on day2    Day: 4     Pt endorsed: no complaint    Review of Systems: Patient denied nausea, vomiting, mouth pain,  chest pain, cough, dyspnea, abdominal pain, constipation, diarrhea, rectal pain,  rash, fatigue, headache, bleeding    Pain scale: denies                                 Location: NA    Diet:  consistent carbo no snack    Allergies: No Known Allergies   Diagnosis: DLBCL    Protocol/Chemo Regimen: cycle 1- R-miniCHOP, Rituximab on day1, CHOP to start on day2    Day: 4     Pt endorsed: no complaint    Review of Systems: Patient denied nausea, vomiting, mouth pain,  chest pain, cough, dyspnea, abdominal pain, constipation, diarrhea, rectal pain,  rash, fatigue, headache, bleeding    Pain scale: denies                                 Location: NA    Diet:  consistent carbo no snack    Allergies: No Known Allergies    HEME/ONC MEDICATIONS  methotrexate PF IntraThecal (eMAR) 15 milliGRAM(s) IntraThecal once    STANDING MEDICATIONS  allopurinol 100 milliGRAM(s) Oral daily  amLODIPine   Tablet 10 milliGRAM(s) Oral daily  buPROPion XL (24-Hour) . 300 milliGRAM(s) Oral daily  carvedilol 12.5 milliGRAM(s) Oral every 12 hours  chlorhexidine 2% Cloths 1 Application(s) Topical <User Schedule>  dextrose 5%. 1000 milliLiter(s) IV Continuous <Continuous>  dextrose 5%. 1000 milliLiter(s) IV Continuous <Continuous>  dextrose 5%. 1000 milliLiter(s) IV Continuous <Continuous>  dextrose 50% Injectable 25 Gram(s) IV Push once  dextrose 50% Injectable 12.5 Gram(s) IV Push once  dextrose 50% Injectable 25 Gram(s) IV Push once  dextrose 50% Injectable 25 Gram(s) IV Push once  escitalopram 10 milliGRAM(s) Oral daily  glucagon  Injectable 1 milliGRAM(s) IntraMuscular once  hydrALAZINE 100 milliGRAM(s) Oral every 8 hours  insulin glargine Injectable (LANTUS) 30 Unit(s) SubCutaneous at bedtime  insulin lispro (ADMELOG) corrective regimen sliding scale   SubCutaneous at bedtime  insulin lispro (ADMELOG) corrective regimen sliding scale   SubCutaneous three times a day before meals  insulin lispro Injectable (ADMELOG) 14 Unit(s) SubCutaneous three times a day before meals  levothyroxine 88 MICROGram(s) Oral daily  lisinopril 20 milliGRAM(s) Oral daily  potassium chloride    Tablet ER 20 milliEquivalent(s) Oral two times a day  predniSONE   Tablet 80 milliGRAM(s) Oral every 24 hours  sodium chloride 0.9%. 1000 milliLiter(s) IV Continuous <Continuous>    PRN MEDICATIONS  acetaminophen     Tablet .. 650 milliGRAM(s) Oral every 6 hours PRN  baclofen 5 milliGRAM(s) Oral every 8 hours PRN  dextrose Oral Gel 15 Gram(s) Oral once PRN  metoclopramide Injectable 10 milliGRAM(s) IV Push every 6 hours PRN  polyethylene glycol 3350 17 Gram(s) Oral two times a day PRN  sodium chloride 0.9% lock flush 10 milliLiter(s) IV Push every 1 hour PRN    Vital Signs Last 24 Hrs  T(C): 36.5 (31 Dec 2024 05:40), Max: 37.2 (31 Dec 2024 01:26)  T(F): 97.7 (31 Dec 2024 05:40), Max: 99 (31 Dec 2024 01:26)  HR: 65 (31 Dec 2024 05:40) (60 - 70)  BP: 187/68 (31 Dec 2024 05:40) (129/63 - 187/68)  RR: 18 (31 Dec 2024 05:40) (18 - 18)  SpO2: 97% (31 Dec 2024 05:40) (97% - 99%)    Parameters below as of 31 Dec 2024 05:40  Patient On (Oxygen Delivery Method): room air    PHYSICAL EXAM  General: adult in NAD  HEENT: clear oropharynx, anicteric sclera  CV: normal S1/S2 RRR  Lungs: positive air movement b/l ant lungs,clear to auscultation, no wheezes, no rales  Abdomen: soft, + BS,  non-tender non-distended,   Ext: no edema  Skin: no rash  Neuro: alert and oriented X 3, no focal deficits  Central Line:  PICC CDI    LABS:                                         6.4    3.77  )-----------( 144      ( 31 Dec 2024 07:04 )             20.3     Mean Cell Volume : 94.4 fl  Mean Cell Hemoglobin : 29.8 pg  Mean Cell Hemoglobin Concentration : 31.5 g/dL  Auto Neutrophil # : 3.67 K/uL  Auto Lymphocyte # : 0.07 K/uL  Auto Monocyte # : 0.03 K/uL  Auto Eosinophil # : 0.00 K/uL  Auto Basophil # : 0.00 K/uL  Auto Neutrophil % : 97.3 %  Auto Lymphocyte % : 1.8 %  Auto Monocyte % : 0.9 %  Auto Eosinophil % : 0.0 %  Auto Basophil % : 0.0 %    12-31    141  |  108  |  25[H]  ----------------------------<  257[H]  4.1   |  23  |  0.64    Ca    8.1[L]      31 Dec 2024 07:04  Phos  2.7     12-31  Mg     1.5     12-31    TPro  5.1[L]  /  Alb  2.5[L]  /  TBili  0.3  /  DBili  x   /  AST  15  /  ALT  13  /  AlkPhos  90  12-31    PT/INR - ( 31 Dec 2024 07:04 )   PT: 11.2 sec;   INR: 0.97 ratio    PTT - ( 31 Dec 2024 07:04 )  PTT:27.9 sec      Uric Acid 3.8    -----------------    Cultures: no recent    -----------------    RADIOLOGY & ADDITIONAL STUDIES:  from: Xray Chest 1 View- PORTABLE-Urgent (Xray Chest 1 View- PORTABLE-Urgent .) (12.28.24 @ 14:48)   FINDINGS:  Left-sided PICC line with tip in the SVC.  The heart is normal in size.  No focal consolidation.  There is no pneumothorax or pleural effusion.  No acute osseous abnormalities. Chronic left-sided rib fractures. Chronic   left humeral neck fracture.    IMPRESSION:  No focal consolidation.

## 2024-12-31 NOTE — PROVIDER CONTACT NOTE (OTHER) - SITUATION
blood glucose 514
/83 HR 63
KK=922/68
pt. not eating dinner
pt blood sugar is 367
bg 88
blood glucose 514
pt /73
pt BP is 181/76

## 2024-12-31 NOTE — PROVIDER CONTACT NOTE (OTHER) - ACTION/TREATMENT ORDERED:
give BP med , monitor, nursing care ongoing
provider notified, patient received Coreg dose based on vital signs. will continue to monitor vital signs and hypertension management
provider notified, ordered to ask family about diabetes history (family reports that diabetes has been difficult to control for years), wait until provider updates insulin orders.
give 6am norvasc and lisinopril as ordered, reschedule 6am coreg to 8am
pt. educated on importance of eating meals, pt. encouraged to eat. pre-meal insulin held.
repeat estela BP and give 6am norvasc at 4am
do not give pre-meal insulin and encourage pt. to eat breakfast
provider has updated insulin orders, administer 12 units insulin standing and 12 units sliding scale. make sure patient eats meal. reassess blood sugar = 416.
sliding scale coverage to be ordered

## 2024-12-31 NOTE — PROVIDER CONTACT NOTE (OTHER) - BACKGROUND
dlbcl, diabetes mellitus
dx DLBCL
Pt. admitted for DLBCL
dlbcl, diabetes mellitus
dx DLBCL
dx DLBCL
DLBCL
history of HTN, admitted with DLBCL
pt. admitted for DLBCL

## 2024-12-31 NOTE — PROVIDER CONTACT NOTE (OTHER) - REASON
/76
blood glucose 514
fingerstick 367
pt. not eating dinner
BP=231/68
blood glucose 514
BG 88
/83 HR 63
pt /73

## 2024-12-31 NOTE — PROGRESS NOTE ADULT - PROBLEM SELECTOR PLAN 1
Post transplant lymphoproliferative disorder  5cm liver mass--Prior known, s/p liver bx at [facility] showed 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 also noted  14cm splenomegaly since 2018  repeat MRI abdomen 12/2024: 6.6cm liver mass, 17.7cm splenomegaly, partially visualized  retroperitoneal soft tissue encasing aorta and IVC  Continue  Allopurinol 100 mg daily   Continue  IVF: NS at 100 cc/hr if evidence of TLS or not tolerating PO   HIV/acute hepatitis panel(-), MUGA EF 72%, G6PD level 24  Transfuse if Hgb < 7.0 or PLT <10, <15 if fever, <20 if bleeding, TLS labs BID keep K4, Mg2 Post transplant lymphoproliferative disorder  5cm liver mass--Prior known, s/p liver bx at [facility] showed 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 also noted  14cm splenomegaly since 2018  repeat MRI abdomen 12/2024: 6.6cm liver mass, 17.7cm splenomegaly, partially visualized  retroperitoneal soft tissue encasing aorta and IVC  Continue  Allopurinol 100 mg daily   Continue  IVF: NS at 100 cc/hr if evidence of TLS or not tolerating PO   HIV/acute hepatitis panel(-), MUGA EF 72%, G6PD level 24  Transfuse if Hgb < 7.0 or PLT <10, <15 if fever, <20 if bleeding, TLS labs BID keep K4, Mg2  12/31 Plan for LP with IT MTX today; 1u pRBC for Hgb 6.4

## 2024-12-31 NOTE — PROGRESS NOTE ADULT - PROBLEM SELECTOR PLAN 6
s/p LKRT 2012 and L native nephrectomy.   Per Hillary ZEPEDA/[facility], pt. baseline SCR ~1.0. Pt. SCr is at baseline.   - Obtain U/A  - Monitor labs and I/Os. Avoid nephrotoxins including, ACE/ARB, NSAIDs, contrast, etc. Dose medications as per eGFR.   2. Immunosuppression  Pt. is on Tacrolimus 2mg AM/1mg PM, imuran 50mg BID, and prednisone 20mg for hypercalcemia.   - Continue tacrolimus and prednisone   - Hold imuran in setting of recent infection and chemotherapy.   3. Hypercalcemia  Calcium on admission to [facility] 11.8. Likely related to new DLBCL diagnosis.   - Recommend Vitamin D 1,25 and 25-OH, PTH, ionized Ca  - Recommend maintenance  IVFs for now  - Recommend getting results from [facility] regarding dates of pamidronate and calcitonin treatment.  12/28 tacro level 8.1, keep same dose  12/30: Stopped tacrolimus, as pt. currently receiving cytoxan as part of R-CHOP regimen.

## 2024-12-31 NOTE — PROVIDER CONTACT NOTE (OTHER) - NAME OF MD/NP/PA/DO NOTIFIED:
RICHIE Rush
micaela taylor np
RICHIE Rush
ALBERTO Funk
Meg Green NP
ALBERTO Funk
ALBERTO Hercules
ALBERTO Funk
micaela taylor np

## 2025-01-01 LAB
ALBUMIN SERPL ELPH-MCNC: 2.5 G/DL — LOW (ref 3.3–5)
ALP SERPL-CCNC: 97 U/L — SIGNIFICANT CHANGE UP (ref 40–120)
ALT FLD-CCNC: 12 U/L — SIGNIFICANT CHANGE UP (ref 10–45)
ANION GAP SERPL CALC-SCNC: 9 MMOL/L — SIGNIFICANT CHANGE UP (ref 5–17)
APTT BLD: 26.9 SEC — SIGNIFICANT CHANGE UP (ref 24.5–35.6)
AST SERPL-CCNC: 15 U/L — SIGNIFICANT CHANGE UP (ref 10–40)
BASOPHILS # BLD AUTO: 0.01 K/UL — SIGNIFICANT CHANGE UP (ref 0–0.2)
BASOPHILS NFR BLD AUTO: 0.2 % — SIGNIFICANT CHANGE UP (ref 0–2)
BILIRUB SERPL-MCNC: 0.5 MG/DL — SIGNIFICANT CHANGE UP (ref 0.2–1.2)
BUN SERPL-MCNC: 26 MG/DL — HIGH (ref 7–23)
CA-I BLD-SCNC: 1.28 MMOL/L — SIGNIFICANT CHANGE UP (ref 1.15–1.33)
CALCIUM SERPL-MCNC: 8.6 MG/DL — SIGNIFICANT CHANGE UP (ref 8.4–10.5)
CHLORIDE SERPL-SCNC: 104 MMOL/L — SIGNIFICANT CHANGE UP (ref 96–108)
CO2 SERPL-SCNC: 24 MMOL/L — SIGNIFICANT CHANGE UP (ref 22–31)
CREAT SERPL-MCNC: 0.56 MG/DL — SIGNIFICANT CHANGE UP (ref 0.5–1.3)
D DIMER BLD IA.RAPID-MCNC: 2062 NG/ML DDU — HIGH
EGFR: 108 ML/MIN/1.73M2 — SIGNIFICANT CHANGE UP
EOSINOPHIL # BLD AUTO: 0 K/UL — SIGNIFICANT CHANGE UP (ref 0–0.5)
EOSINOPHIL NFR BLD AUTO: 0 % — SIGNIFICANT CHANGE UP (ref 0–6)
FIBRINOGEN PPP-MCNC: 236 MG/DL — SIGNIFICANT CHANGE UP (ref 200–445)
GLUCOSE BLDC GLUCOMTR-MCNC: 107 MG/DL — HIGH (ref 70–99)
GLUCOSE BLDC GLUCOMTR-MCNC: 120 MG/DL — HIGH (ref 70–99)
GLUCOSE BLDC GLUCOMTR-MCNC: 127 MG/DL — HIGH (ref 70–99)
GLUCOSE BLDC GLUCOMTR-MCNC: 144 MG/DL — HIGH (ref 70–99)
GLUCOSE BLDC GLUCOMTR-MCNC: 146 MG/DL — HIGH (ref 70–99)
GLUCOSE BLDC GLUCOMTR-MCNC: 176 MG/DL — HIGH (ref 70–99)
GLUCOSE SERPL-MCNC: 161 MG/DL — HIGH (ref 70–99)
HCT VFR BLD CALC: 27.4 % — LOW (ref 39–50)
HGB BLD-MCNC: 9.2 G/DL — LOW (ref 13–17)
IMM GRANULOCYTES NFR BLD AUTO: 0.6 % — SIGNIFICANT CHANGE UP (ref 0–0.9)
INR BLD: 0.89 RATIO — SIGNIFICANT CHANGE UP (ref 0.85–1.16)
LDH SERPL L TO P-CCNC: 266 U/L — HIGH (ref 50–242)
LYMPHOCYTES # BLD AUTO: 0.18 K/UL — LOW (ref 1–3.3)
LYMPHOCYTES # BLD AUTO: 3.4 % — LOW (ref 13–44)
MAGNESIUM SERPL-MCNC: 1.7 MG/DL — SIGNIFICANT CHANGE UP (ref 1.6–2.6)
MCHC RBC-ENTMCNC: 30.1 PG — SIGNIFICANT CHANGE UP (ref 27–34)
MCHC RBC-ENTMCNC: 33.6 G/DL — SIGNIFICANT CHANGE UP (ref 32–36)
MCV RBC AUTO: 89.5 FL — SIGNIFICANT CHANGE UP (ref 80–100)
MONOCYTES # BLD AUTO: 0.16 K/UL — SIGNIFICANT CHANGE UP (ref 0–0.9)
MONOCYTES NFR BLD AUTO: 3.1 % — SIGNIFICANT CHANGE UP (ref 2–14)
NEUTROPHILS # BLD AUTO: 4.86 K/UL — SIGNIFICANT CHANGE UP (ref 1.8–7.4)
NEUTROPHILS NFR BLD AUTO: 92.7 % — HIGH (ref 43–77)
NRBC # BLD: 0 /100 WBCS — SIGNIFICANT CHANGE UP (ref 0–0)
PHOSPHATE SERPL-MCNC: 2.2 MG/DL — LOW (ref 2.5–4.5)
PLATELET # BLD AUTO: 199 K/UL — SIGNIFICANT CHANGE UP (ref 150–400)
POTASSIUM SERPL-MCNC: 4.3 MMOL/L — SIGNIFICANT CHANGE UP (ref 3.5–5.3)
POTASSIUM SERPL-SCNC: 4.3 MMOL/L — SIGNIFICANT CHANGE UP (ref 3.5–5.3)
PROT SERPL-MCNC: 5.6 G/DL — LOW (ref 6–8.3)
PROTHROM AB SERPL-ACNC: 10.2 SEC — SIGNIFICANT CHANGE UP (ref 9.9–13.4)
RBC # BLD: 3.06 M/UL — LOW (ref 4.2–5.8)
RBC # FLD: 19.8 % — HIGH (ref 10.3–14.5)
SODIUM SERPL-SCNC: 137 MMOL/L — SIGNIFICANT CHANGE UP (ref 135–145)
T4 FREE SERPL-MCNC: 1.2 NG/DL — SIGNIFICANT CHANGE UP (ref 0.9–1.7)
TSH SERPL-MCNC: 0.55 UIU/ML — SIGNIFICANT CHANGE UP (ref 0.27–4.2)
URATE SERPL-MCNC: 4 MG/DL — SIGNIFICANT CHANGE UP (ref 3.4–8.8)
WBC # BLD: 5.24 K/UL — SIGNIFICANT CHANGE UP (ref 3.8–10.5)
WBC # FLD AUTO: 5.24 K/UL — SIGNIFICANT CHANGE UP (ref 3.8–10.5)

## 2025-01-01 PROCEDURE — 99233 SBSQ HOSP IP/OBS HIGH 50: CPT | Mod: FS

## 2025-01-01 PROCEDURE — 74018 RADEX ABDOMEN 1 VIEW: CPT | Mod: 26

## 2025-01-01 RX ORDER — SENNOSIDES 8.6 MG/1
2 TABLET, FILM COATED ORAL AT BEDTIME
Refills: 0 | Status: DISCONTINUED | OUTPATIENT
Start: 2025-01-01 | End: 2025-01-02

## 2025-01-01 RX ORDER — MAGNESIUM SULFATE 500 MG/ML
2 INJECTION, SOLUTION INTRAMUSCULAR; INTRAVENOUS ONCE
Refills: 0 | Status: COMPLETED | OUTPATIENT
Start: 2025-01-01 | End: 2025-01-01

## 2025-01-01 RX ORDER — MAGNESIUM HYDROXIDE 400 MG/5ML
30 SUSPENSION, ORAL (FINAL DOSE FORM) ORAL ONCE
Refills: 0 | Status: COMPLETED | OUTPATIENT
Start: 2025-01-01 | End: 2025-01-01

## 2025-01-01 RX ORDER — POTASSIUM PHOSPHATE, MONOBASIC AND POTASSIUM PHOSPHATE, DIBASIC 224; 236 MG/ML; MG/ML
30 INJECTION, SOLUTION INTRAVENOUS ONCE
Refills: 0 | Status: COMPLETED | OUTPATIENT
Start: 2025-01-01 | End: 2025-01-01

## 2025-01-01 RX ORDER — POLYETHYLENE GLYCOL 3350 17 G/DOSE
17 POWDER (GRAM) ORAL DAILY
Refills: 0 | Status: DISCONTINUED | OUTPATIENT
Start: 2025-01-01 | End: 2025-01-02

## 2025-01-01 RX ADMIN — HEPARIN SODIUM 5000 UNIT(S): 1000 INJECTION, SOLUTION INTRAVENOUS; SUBCUTANEOUS at 17:47

## 2025-01-01 RX ADMIN — Medication 10 MILLIGRAM(S): at 05:58

## 2025-01-01 RX ADMIN — BUPROPION HYDROCHLORIDE 300 MILLIGRAM(S): 150 TABLET, EXTENDED RELEASE ORAL at 11:10

## 2025-01-01 RX ADMIN — HYDRALAZINE HYDROCHLORIDE 100 MILLIGRAM(S): 10 TABLET ORAL at 21:32

## 2025-01-01 RX ADMIN — LISINOPRIL 20 MILLIGRAM(S): 30 TABLET ORAL at 05:58

## 2025-01-01 RX ADMIN — INSULIN GLARGINE-YFGN 34 UNIT(S): 100 INJECTION, SOLUTION SUBCUTANEOUS at 22:12

## 2025-01-01 RX ADMIN — SODIUM CHLORIDE 20 MILLILITER(S): 9 INJECTION, SOLUTION INTRAMUSCULAR; INTRAVENOUS; SUBCUTANEOUS at 05:59

## 2025-01-01 RX ADMIN — LEVOTHYROXINE SODIUM 88 MICROGRAM(S): 175 TABLET ORAL at 05:58

## 2025-01-01 RX ADMIN — Medication 30 MILLILITER(S): at 21:33

## 2025-01-01 RX ADMIN — CARVEDILOL 12.5 MILLIGRAM(S): 25 TABLET, FILM COATED ORAL at 05:58

## 2025-01-01 RX ADMIN — Medication 80 MILLIGRAM(S): at 11:10

## 2025-01-01 RX ADMIN — MAGNESIUM SULFATE 25 GRAM(S): 500 INJECTION, SOLUTION INTRAMUSCULAR; INTRAVENOUS at 11:09

## 2025-01-01 RX ADMIN — ALLOPURINOL 100 MILLIGRAM(S): 100 TABLET ORAL at 11:10

## 2025-01-01 RX ADMIN — POTASSIUM CHLORIDE 20 MILLIEQUIVALENT(S): 600 TABLET, FILM COATED, EXTENDED RELEASE ORAL at 05:58

## 2025-01-01 RX ADMIN — HEPARIN SODIUM 5000 UNIT(S): 1000 INJECTION, SOLUTION INTRAVENOUS; SUBCUTANEOUS at 05:57

## 2025-01-01 RX ADMIN — BACLOFEN 5 MILLIGRAM(S): 10 TABLET ORAL at 11:30

## 2025-01-01 RX ADMIN — ESCITALOPRAM OXALATE 10 MILLIGRAM(S): 10 TABLET ORAL at 11:10

## 2025-01-01 RX ADMIN — HYDRALAZINE HYDROCHLORIDE 100 MILLIGRAM(S): 10 TABLET ORAL at 15:44

## 2025-01-01 RX ADMIN — Medication 18 UNIT(S): at 09:46

## 2025-01-01 RX ADMIN — POTASSIUM CHLORIDE 20 MILLIEQUIVALENT(S): 600 TABLET, FILM COATED, EXTENDED RELEASE ORAL at 17:47

## 2025-01-01 RX ADMIN — SENNOSIDES 2 TABLET(S): 8.6 TABLET, FILM COATED ORAL at 21:33

## 2025-01-01 RX ADMIN — POTASSIUM PHOSPHATE, MONOBASIC AND POTASSIUM PHOSPHATE, DIBASIC 83.33 MILLIMOLE(S): 224; 236 INJECTION, SOLUTION INTRAVENOUS at 15:44

## 2025-01-01 RX ADMIN — CHLORHEXIDINE GLUCONATE 1 APPLICATION(S): 1.2 RINSE ORAL at 11:10

## 2025-01-01 RX ADMIN — Medication 18 UNIT(S): at 12:53

## 2025-01-01 RX ADMIN — HYDRALAZINE HYDROCHLORIDE 100 MILLIGRAM(S): 10 TABLET ORAL at 05:58

## 2025-01-01 RX ADMIN — CARVEDILOL 12.5 MILLIGRAM(S): 25 TABLET, FILM COATED ORAL at 17:46

## 2025-01-01 RX ADMIN — SODIUM CHLORIDE 20 MILLILITER(S): 9 INJECTION, SOLUTION INTRAMUSCULAR; INTRAVENOUS; SUBCUTANEOUS at 22:12

## 2025-01-01 RX ADMIN — THIAMINE HYDROCHLORIDE 100 MILLIGRAM(S): 100 INJECTION, SOLUTION INTRAMUSCULAR; INTRAVENOUS at 11:13

## 2025-01-01 RX ADMIN — PEGFILGRASTIM-APGF 6 MILLIGRAM(S): 6 INJECTION, SOLUTION SUBCUTANEOUS at 11:30

## 2025-01-01 RX ADMIN — Medication 18 UNIT(S): at 17:42

## 2025-01-01 NOTE — PROGRESS NOTE ADULT - NS ATTEND AMEND GEN_ALL_CORE FT
68yo M w/ h/o renal transplant on tacro, now with newly diagnosed DLBCL. Admitted for initiation of treatment w/ mini R-CHOP. Today is C1 D4.    Hx: renal transplant in 2012 2/2 DM, s/p left nephrectomy, peripheral neuropathy, HTN, HLD, ANGELIKA    [facility]eme:  Rituxan 12/28  mini CHOP on 12/29   PICC placed given difficult access  MUGA EF 72%  Cont allopurinol, IVF  TLS labs q12  LP with IT MTX today.     Renal:  Cont tacrolimus and prednisone  transplant nephrology consulted   Hypercalcemia - calcitonin x2 12/28. Was previously given pamidronate x2 at [facility] (12/6 and ?12/18)    ID:   not neutropenic    Dispo:  Patient is bedbound at this point, unclear etiology based on historical documentation right now.   Back to rehab Thursday 1/2.   Fulphila on 1/1. 70yo M w/ h/o renal transplant on tacro, now with newly diagnosed DLBCL. Admitted for initiation of treatment w/ mini R-CHOP. Today is C1 D4.    Hx: renal transplant in 2012 2/2 DM, s/p left nephrectomy, peripheral neuropathy, HTN, HLD, ANGELIKA    Heme:  Rituxan 12/28  mini CHOP on 12/29   PICC placed given difficult access  MUGA EF 72%  Cont allopurinol, IVF  TLS labs q12  LP with IT MTX done 12/31 -f/u results    Renal:  Cont tacrolimus and prednisone  transplant nephrology consulted   Hypercalcemia - calcitonin x2 12/28. Was previously given pamidronate x2 at [place] (12/6 and ?12/18)    ID:   not neutropenic    Dispo:  Patient is bedbound at this point, unclear etiology based on historical documentation right now.   Back to rehab Thursday 1/2.   Fulphila on 1/1.

## 2025-01-01 NOTE — PROGRESS NOTE ADULT - PROBLEM SELECTOR PLAN 1
Post transplant lymphoproliferative disorder  5cm liver mass--Prior known, s/p liver bx at [facility] showed 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 also noted  14cm splenomegaly since 2018  repeat MRI abdomen 12/2024: 6.6cm liver mass, 17.7cm splenomegaly, partially visualized  retroperitoneal soft tissue encasing aorta and IVC  Continue  Allopurinol 100 mg daily   Continue  IVF: NS at 100 cc/hr if evidence of TLS or not tolerating PO   HIV/acute hepatitis panel(-), MUGA EF 72%, G6PD level 24  Transfuse if Hgb < 7.0 or PLT <10, <15 if fever, <20 if bleeding, TLS labs BID keep K4, Mg2  12/31 s/p LP with IT MTX - cell counts normal, flow cyto pending results; 1u pRBC for Hgb 6.4; Thiamine challenge  1/1 plan for Fulphila today; d/c tomorrow back to [facility] if bed available. Post transplant lymphoproliferative disorder  5cm liver mass--Prior known, s/p liver bx at [facility] showed 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 also noted  14cm splenomegaly since 2018  repeat MRI abdomen 12/2024: 6.6cm liver mass, 17.7cm splenomegaly, partially visualized  retroperitoneal soft tissue encasing aorta and IVC  Continue  Allopurinol 100 mg daily   Continue  IVF: NS at 100 cc/hr if evidence of TLS or not tolerating PO   HIV/acute hepatitis panel(-), MUGA EF 72%, G6PD level 24  Transfuse if Hgb < 7.0 or PLT <10, <15 if fever, <20 if bleeding, TLS labs BID keep K4, Mg2  12/31 s/p LP with IT MTX - cell counts normal, flow cyto pending results; 1u pRBC for Hgb 6.4; Thiamine challenge  1/1 s/p Fulphila today; d/c to rehab tomorrow back to [facility] if bed available.

## 2025-01-01 NOTE — PROGRESS NOTE ADULT - PROBLEM SELECTOR PLAN 6
s/p LKRT 2012 and L native nephrectomy.   Per Hillary ZEPEDA/[facility], pt. baseline SCR ~1.0. Pt. SCr is at baseline.   - Obtain U/A  - Monitor labs and I/Os. Avoid nephrotoxins including, ACE/ARB, NSAIDs, contrast, etc. Dose medications as per eGFR.   2. Immunosuppression  Pt. is on Tacrolimus 2mg AM/1mg PM, imuran 50mg BID, and prednisone 20mg for hypercalcemia.   - Continue tacrolimus and prednisone   - Hold imuran in setting of recent infection and chemotherapy.   3. Hypercalcemia  Calcium on admission to [facility] 11.8. Likely related to new DLBCL diagnosis.   - Recommend Vitamin D 1,25 and 25-OH, PTH, ionized Ca  - Recommend maintenance  IVFs for now  - Recommend getting [facility] from [facility] regarding dates of pamidronate and calcitonin treatment.  12/28 tacro level 8.1, keep same dose  12/30: Stopped tacrolimus, as pt. currently receiving cytoxan as part of R-CHOP regimen. s/p LKRT 2012 and L native nephrectomy.   Per Hillary ZEPEDA/[facility], pt. baseline SCR ~1.0. Pt. SCr is at baseline.   - Obtain U/A  - Monitor labs and I/Os. Avoid nephrotoxins including, ACE/ARB, NSAIDs, contrast, etc. Dose medications as per eGFR.   2. Immunosuppression  Pt. is on Tacrolimus 2mg AM/1mg PM, imuran 50mg BID, and prednisone 20mg for hypercalcemia.   - Continue prednisone   - Hold imuran in setting of recent infection and chemotherapy.   3. Hypercalcemia  Calcium on admission to [facility] 11.8. Likely related to new DLBCL diagnosis.   - Recommend Vitamin D 1,25 and 25-OH, PTH, ionized Ca  - Recommend maintenance  IVFs for now  - Recommend getting results from [facility] regarding dates of pamidronate and calcitonin treatment.  12/28 tacro level 8.1, keep same dose  12/30: Stopped tacrolimus, as pt. currently receiving cytoxan as part of R-CHOP regimen.

## 2025-01-01 NOTE — PROGRESS NOTE ADULT - SUBJECTIVE AND OBJECTIVE BOX
Diagnosis: DLBCL    Protocol/Chemo Regimen: cycle 1- R-miniCHOP (Rituximab as day 1, CHOP started on day 2)    Day: 5     Pt endorsed: (more confused per family); no complaints from patient    Review of Systems: Patient denied nausea, vomiting, mouth pain,  chest pain, cough, dyspnea, abdominal pain, constipation, diarrhea, rectal pain,  rash, fatigue, headache, bleeding    Pain scale: denies                                 Location: NA    Diet:  consistent carbo no snack    Allergies: No Known Allergies    HEME/ONC MEDICATIONS  heparin   Injectable 5000 Unit(s) SubCutaneous every 12 hours    STANDING MEDICATIONS  allopurinol 100 milliGRAM(s) Oral daily  amLODIPine   Tablet 10 milliGRAM(s) Oral daily  buPROPion XL (24-Hour) . 300 milliGRAM(s) Oral daily  carvedilol 12.5 milliGRAM(s) Oral every 12 hours  chlorhexidine 2% Cloths 1 Application(s) Topical <User Schedule>  escitalopram 10 milliGRAM(s) Oral daily  glucagon  Injectable 1 milliGRAM(s) IntraMuscular once  hydrALAZINE 100 milliGRAM(s) Oral every 8 hours  insulin glargine Injectable (LANTUS) 34 Unit(s) SubCutaneous at bedtime  insulin lispro (ADMELOG) corrective regimen sliding scale   SubCutaneous three times a day before meals  insulin lispro (ADMELOG) corrective regimen sliding scale   SubCutaneous at bedtime  insulin lispro Injectable (ADMELOG) 18 Unit(s) SubCutaneous three times a day before meals  levothyroxine 88 MICROGram(s) Oral daily  lisinopril 20 milliGRAM(s) Oral daily  pegfilgrastim-jmdb (FULPHILA) Injectable 6 milliGRAM(s) SubCutaneous once  potassium chloride    Tablet ER 20 milliEquivalent(s) Oral two times a day  predniSONE   Tablet 80 milliGRAM(s) Oral every 24 hours  sodium chloride 0.9%. 1000 milliLiter(s) IV Continuous <Continuous>  thiamine Injectable 100 milliGRAM(s) IV Push daily    PRN MEDICATIONS  acetaminophen     Tablet .. 650 milliGRAM(s) Oral every 6 hours PRN  baclofen 5 milliGRAM(s) Oral every 8 hours PRN  dextrose Oral Gel 15 Gram(s) Oral once PRN  metoclopramide Injectable 10 milliGRAM(s) IV Push every 6 hours PRN  polyethylene glycol 3350 17 Gram(s) Oral two times a day PRN  sodium chloride 0.9% lock flush 10 milliLiter(s) IV Push every 1 hour PRN    Vital Signs Last 24 Hrs  T(C): 36.5 (01 Jan 2025 05:49), Max: 37.2 (31 Dec 2024 16:15)  T(F): 97.7 (01 Jan 2025 05:49), Max: 99 (31 Dec 2024 16:15)  HR: 61 (01 Jan 2025 05:49) (59 - 70)  BP: 136/75 (01 Jan 2025 05:49) (135/70 - 191/63)  RR: 18 (01 Jan 2025 05:49) (18 - 18)  SpO2: 97% (01 Jan 2025 05:49) (97% - 100%)    Parameters below as of 01 Jan 2025 05:49  Patient On (Oxygen Delivery Method): room air    PHYSICAL EXAM  General: adult in NAD  HEENT: clear oropharynx, anicteric sclera  CV: normal S1/S2 RRR  Lungs: positive air movement b/l ant lungs,clear to auscultation, no wheezes, no rales  Abdomen: soft, + BS,  non-tender non-distended,   Ext: no edema  Skin: no rash  Neuro: alert and oriented X 3, no focal deficits  Central Line:  PICC CDI    LABS:                                   Cerebrospinal Fluid Cell Count-1 (12.31.24 @ 14:05)    CSF Color: No Color   Tube Type: Tube 4   CSF Appearance: Clear   CSF Neutrophils: See Note: Mononuclear cells only present on review of cytospin.   Total Nucleated Cell Count, CSF: <1 /uL   RBC Count - Spinal Fluid: 1 /uL    -----------------    Cultures: no recent    -----------------    RADIOLOGY & ADDITIONAL STUDIES:  from: Xray Chest 1 View- PORTABLE-Urgent (Xray Chest 1 View- PORTABLE-Urgent .) (12.28.24 @ 14:48)   FINDINGS:  Left-sided PICC line with tip in the SVC.  The heart is normal in size.  No focal consolidation.  There is no pneumothorax or pleural effusion.  No acute osseous abnormalities. Chronic left-sided rib fractures. Chronic   left humeral neck fracture.    IMPRESSION:  No focal consolidation.           Diagnosis: DLBCL    Protocol/Chemo Regimen: cycle 1- R-miniCHOP (Rituximab as day 1, CHOP started on day 2)    Day: 5     Pt endorsed: (more confused per family); no complaints from patient    Review of Systems: Patient denied nausea, vomiting, mouth pain,  chest pain, cough, dyspnea, abdominal pain, constipation, diarrhea, rectal pain,  rash, fatigue, headache, bleeding    Pain scale: denies                                 Location: NA    Diet:  consistent carbo no snack    Allergies: No Known Allergies    HEME/ONC MEDICATIONS  heparin   Injectable 5000 Unit(s) SubCutaneous every 12 hours    STANDING MEDICATIONS  allopurinol 100 milliGRAM(s) Oral daily  amLODIPine   Tablet 10 milliGRAM(s) Oral daily  buPROPion XL (24-Hour) . 300 milliGRAM(s) Oral daily  carvedilol 12.5 milliGRAM(s) Oral every 12 hours  chlorhexidine 2% Cloths 1 Application(s) Topical <User Schedule>  escitalopram 10 milliGRAM(s) Oral daily  glucagon  Injectable 1 milliGRAM(s) IntraMuscular once  hydrALAZINE 100 milliGRAM(s) Oral every 8 hours  insulin glargine Injectable (LANTUS) 34 Unit(s) SubCutaneous at bedtime  insulin lispro (ADMELOG) corrective regimen sliding scale   SubCutaneous three times a day before meals  insulin lispro (ADMELOG) corrective regimen sliding scale   SubCutaneous at bedtime  insulin lispro Injectable (ADMELOG) 18 Unit(s) SubCutaneous three times a day before meals  levothyroxine 88 MICROGram(s) Oral daily  lisinopril 20 milliGRAM(s) Oral daily  pegfilgrastim-jmdb (FULPHILA) Injectable 6 milliGRAM(s) SubCutaneous once  potassium chloride    Tablet ER 20 milliEquivalent(s) Oral two times a day  predniSONE   Tablet 80 milliGRAM(s) Oral every 24 hours  sodium chloride 0.9%. 1000 milliLiter(s) IV Continuous <Continuous>  thiamine Injectable 100 milliGRAM(s) IV Push daily    PRN MEDICATIONS  acetaminophen     Tablet .. 650 milliGRAM(s) Oral every 6 hours PRN  baclofen 5 milliGRAM(s) Oral every 8 hours PRN  dextrose Oral Gel 15 Gram(s) Oral once PRN  metoclopramide Injectable 10 milliGRAM(s) IV Push every 6 hours PRN  polyethylene glycol 3350 17 Gram(s) Oral two times a day PRN  sodium chloride 0.9% lock flush 10 milliLiter(s) IV Push every 1 hour PRN    Vital Signs Last 24 Hrs  T(C): 36.5 (01 Jan 2025 05:49), Max: 37.2 (31 Dec 2024 16:15)  T(F): 97.7 (01 Jan 2025 05:49), Max: 99 (31 Dec 2024 16:15)  HR: 61 (01 Jan 2025 05:49) (59 - 70)  BP: 136/75 (01 Jan 2025 05:49) (135/70 - 191/63)  RR: 18 (01 Jan 2025 05:49) (18 - 18)  SpO2: 97% (01 Jan 2025 05:49) (97% - 100%)    Parameters below as of 01 Jan 2025 05:49  Patient On (Oxygen Delivery Method): room air    PHYSICAL EXAM  General: adult in NAD  HEENT: clear oropharynx, anicteric sclera  CV: normal S1/S2 RRR  Lungs: positive air movement b/l ant lungs,clear to auscultation, no wheezes, no rales  Abdomen: soft, + BS,  non-tender non-distended,   Ext: no edema  Skin: no rash  Neuro: alert and oriented X 3, no focal deficits  Central Line:  PICC CDI    LABS:                                9.2    5.24  )-----------( 199      ( 01 Jan 2025 07:18 )             27.4     01 Jan 2025 07:18    137    |  104    |  26     ----------------------------<  161    4.3     |  24     |  0.56     Ca    8.6        01 Jan 2025 07:18  Phos  2.2       01 Jan 2025 07:18  Mg     1.7       01 Jan 2025 07:18    TPro  5.6    /  Alb  2.5    /  TBili  0.5    /  DBili  x      /  AST  15     /  ALT  12     /  AlkPhos  97     01 Jan 2025 07:18    PT/INR - ( 01 Jan 2025 07:18 )   PT: 10.2 sec;   INR: 0.89 ratio    PTT - ( 01 Jan 2025 07:18 )  PTT:26.9 sec    CAPILLARY BLOOD GLUCOSE  POCT Blood Glucose.: 144 mg/dL (01 Jan 2025 08:51)  POCT Blood Glucose.: 176 mg/dL (01 Jan 2025 03:28)  POCT Blood Glucose.: 208 mg/dL (31 Dec 2024 20:57)  POCT Blood Glucose.: 183 mg/dL (31 Dec 2024 17:58)  POCT Blood Glucose.: 201 mg/dL (31 Dec 2024 15:16)  POCT Blood Glucose.: 265 mg/dL (31 Dec 2024 12:21)    LIVER FUNCTIONS - ( 01 Jan 2025 07:18 )  Alb: 2.5 g/dL / Pro: 5.6 g/dL / ALK PHOS: 97 U/L / ALT: 12 U/L / AST: 15 U/L / GGT: x           Urinalysis Basic - ( 01 Jan 2025 07:18 )  Color: x / Appearance: x / SG: x / pH: x  Gluc: 161 mg/dL / Ketone: x  / Bili: x / Urobili: x   Blood: x / Protein: x / Nitrite: x   Leuk Esterase: x / RBC: x / WBC x   Sq Epi: x / Non Sq Epi: x / Bacteria: x    Cerebrospinal Fluid Cell Count-1 (12.31.24 @ 14:05)    CSF Color: No Color   Tube Type: Tube 4   CSF Appearance: Clear   CSF Neutrophils: See Note: Mononuclear cells only present on review of cytospin.   Total Nucleated Cell Count, CSF: <1 /uL   RBC Count - Spinal Fluid: 1 /uL    -----------------    Cultures: no recent    -----------------    RADIOLOGY & ADDITIONAL STUDIES:  from: Xray Chest 1 View- PORTABLE-Urgent (Xray Chest 1 View- PORTABLE-Urgent .) (12.28.24 @ 14:48)   FINDINGS:  Left-sided PICC line with tip in the SVC.  The heart is normal in size.  No focal consolidation.  There is no pneumothorax or pleural effusion.  No acute osseous abnormalities. Chronic left-sided rib fractures. Chronic   left humeral neck fracture.    IMPRESSION:  No focal consolidation.           Diagnosis: DLBCL gcb stubtype (hx renal transplant 2012)    Protocol/Chemo Regimen: cycle 1- R-miniCHOP (Rituximab as day 1, CHOP started on day 2)    Day: 5     Pt endorsed: (more confused per family); no complaints from patient    Review of Systems: Patient denied nausea, vomiting, mouth pain,  chest pain, cough, dyspnea, abdominal pain, constipation, diarrhea, rectal pain,  rash, fatigue, headache, bleeding    Pain scale: denies                                 Location: NA    Diet:  consistent carbo no snack    Allergies: No Known Allergies    HEME/ONC MEDICATIONS  heparin   Injectable 5000 Unit(s) SubCutaneous every 12 hours    STANDING MEDICATIONS  allopurinol 100 milliGRAM(s) Oral daily  amLODIPine   Tablet 10 milliGRAM(s) Oral daily  buPROPion XL (24-Hour) . 300 milliGRAM(s) Oral daily  carvedilol 12.5 milliGRAM(s) Oral every 12 hours  chlorhexidine 2% Cloths 1 Application(s) Topical <User Schedule>  escitalopram 10 milliGRAM(s) Oral daily  glucagon  Injectable 1 milliGRAM(s) IntraMuscular once  hydrALAZINE 100 milliGRAM(s) Oral every 8 hours  insulin glargine Injectable (LANTUS) 34 Unit(s) SubCutaneous at bedtime  insulin lispro (ADMELOG) corrective regimen sliding scale   SubCutaneous three times a day before meals  insulin lispro (ADMELOG) corrective regimen sliding scale   SubCutaneous at bedtime  insulin lispro Injectable (ADMELOG) 18 Unit(s) SubCutaneous three times a day before meals  levothyroxine 88 MICROGram(s) Oral daily  lisinopril 20 milliGRAM(s) Oral daily  pegfilgrastim-jmdb (FULPHILA) Injectable 6 milliGRAM(s) SubCutaneous once  potassium chloride    Tablet ER 20 milliEquivalent(s) Oral two times a day  predniSONE   Tablet 80 milliGRAM(s) Oral every 24 hours  sodium chloride 0.9%. 1000 milliLiter(s) IV Continuous <Continuous>  thiamine Injectable 100 milliGRAM(s) IV Push daily    PRN MEDICATIONS  acetaminophen     Tablet .. 650 milliGRAM(s) Oral every 6 hours PRN  baclofen 5 milliGRAM(s) Oral every 8 hours PRN  dextrose Oral Gel 15 Gram(s) Oral once PRN  metoclopramide Injectable 10 milliGRAM(s) IV Push every 6 hours PRN  polyethylene glycol 3350 17 Gram(s) Oral two times a day PRN  sodium chloride 0.9% lock flush 10 milliLiter(s) IV Push every 1 hour PRN    Vital Signs Last 24 Hrs  T(C): 36.5 (01 Jan 2025 05:49), Max: 37.2 (31 Dec 2024 16:15)  T(F): 97.7 (01 Jan 2025 05:49), Max: 99 (31 Dec 2024 16:15)  HR: 61 (01 Jan 2025 05:49) (59 - 70)  BP: 136/75 (01 Jan 2025 05:49) (135/70 - 191/63)  RR: 18 (01 Jan 2025 05:49) (18 - 18)  SpO2: 97% (01 Jan 2025 05:49) (97% - 100%)    Parameters below as of 01 Jan 2025 05:49  Patient On (Oxygen Delivery Method): room air    PHYSICAL EXAM  General: adult in NAD  HEENT: clear oropharynx, anicteric sclera  CV: normal S1/S2 RRR  Lungs: positive air movement b/l ant lungs, clear to auscultation, no wheezes, no rales  Abdomen: soft, + BS,  non-tender non-distended,   Ext: no edema  Skin: no rash  Neuro: alert and oriented X 2, no focal deficits  Central Line:  PICC CDI    LABS:                                9.2    5.24  )-----------( 199      ( 01 Jan 2025 07:18 )             27.4     01 Jan 2025 07:18    137    |  104    |  26     ----------------------------<  161    4.3     |  24     |  0.56     Ca    8.6        01 Jan 2025 07:18  Phos  2.2       01 Jan 2025 07:18  Mg     1.7       01 Jan 2025 07:18    TPro  5.6    /  Alb  2.5    /  TBili  0.5    /  DBili  x      /  AST  15     /  ALT  12     /  AlkPhos  97     01 Jan 2025 07:18    PT/INR - ( 01 Jan 2025 07:18 )   PT: 10.2 sec;   INR: 0.89 ratio    PTT - ( 01 Jan 2025 07:18 )  PTT:26.9 sec    CAPILLARY BLOOD GLUCOSE  POCT Blood Glucose.: 144 mg/dL (01 Jan 2025 08:51)  POCT Blood Glucose.: 176 mg/dL (01 Jan 2025 03:28)  POCT Blood Glucose.: 208 mg/dL (31 Dec 2024 20:57)  POCT Blood Glucose.: 183 mg/dL (31 Dec 2024 17:58)  POCT Blood Glucose.: 201 mg/dL (31 Dec 2024 15:16)  POCT Blood Glucose.: 265 mg/dL (31 Dec 2024 12:21)    LIVER FUNCTIONS - ( 01 Jan 2025 07:18 )  Alb: 2.5 g/dL / Pro: 5.6 g/dL / ALK PHOS: 97 U/L / ALT: 12 U/L / AST: 15 U/L / GGT: x           Urinalysis Basic - ( 01 Jan 2025 07:18 )  Color: x / Appearance: x / SG: x / pH: x  Gluc: 161 mg/dL / Ketone: x  / Bili: x / Urobili: x   Blood: x / Protein: x / Nitrite: x   Leuk Esterase: x / RBC: x / WBC x   Sq Epi: x / Non Sq Epi: x / Bacteria: x    Cerebrospinal Fluid Cell Count-1 (12.31.24 @ 14:05)    CSF Color: No Color   Tube Type: Tube 4   CSF Appearance: Clear   CSF Neutrophils: See Note: Mononuclear cells only present on review of cytospin.   Total Nucleated Cell Count, CSF: <1 /uL   RBC Count - Spinal Fluid: 1 /uL    -----------------    Cultures: no recent    -----------------    RADIOLOGY & ADDITIONAL STUDIES:  from: Xray Chest 1 View- PORTABLE-Urgent (Xray Chest 1 View- PORTABLE-Urgent .) (12.28.24 @ 14:48)   FINDINGS:  Left-sided PICC line with tip in the SVC.  The heart is normal in size.  No focal consolidation.  There is no pneumothorax or pleural effusion.  No acute osseous abnormalities. Chronic left-sided rib fractures. Chronic   left humeral neck fracture.    IMPRESSION:  No focal consolidation.           Diagnosis: DLBCL gcb stubtype (hx renal transplant 2012)    Protocol/Chemo Regimen: cycle 1- R-miniCHOP (Rituximab as day 1, CHOP started on day 2)    Day: 6     Pt endorsed: (more confused per family); no complaints from patient    Review of Systems: Patient denied nausea, vomiting, mouth pain,  chest pain, cough, dyspnea, abdominal pain, constipation, diarrhea, rectal pain,  rash, fatigue, headache, bleeding    Pain scale: denies                                 Location: NA    Diet:  consistent carbo no snack    Allergies: No Known Allergies    HEME/ONC MEDICATIONS  heparin   Injectable 5000 Unit(s) SubCutaneous every 12 hours    STANDING MEDICATIONS  allopurinol 100 milliGRAM(s) Oral daily  amLODIPine   Tablet 10 milliGRAM(s) Oral daily  buPROPion XL (24-Hour) . 300 milliGRAM(s) Oral daily  carvedilol 12.5 milliGRAM(s) Oral every 12 hours  chlorhexidine 2% Cloths 1 Application(s) Topical <User Schedule>  escitalopram 10 milliGRAM(s) Oral daily  glucagon  Injectable 1 milliGRAM(s) IntraMuscular once  hydrALAZINE 100 milliGRAM(s) Oral every 8 hours  insulin glargine Injectable (LANTUS) 34 Unit(s) SubCutaneous at bedtime  insulin lispro (ADMELOG) corrective regimen sliding scale   SubCutaneous three times a day before meals  insulin lispro (ADMELOG) corrective regimen sliding scale   SubCutaneous at bedtime  insulin lispro Injectable (ADMELOG) 18 Unit(s) SubCutaneous three times a day before meals  levothyroxine 88 MICROGram(s) Oral daily  lisinopril 20 milliGRAM(s) Oral daily  pegfilgrastim-jmdb (FULPHILA) Injectable 6 milliGRAM(s) SubCutaneous once  potassium chloride    Tablet ER 20 milliEquivalent(s) Oral two times a day  predniSONE   Tablet 80 milliGRAM(s) Oral every 24 hours  sodium chloride 0.9%. 1000 milliLiter(s) IV Continuous <Continuous>  thiamine Injectable 100 milliGRAM(s) IV Push daily    PRN MEDICATIONS  acetaminophen     Tablet .. 650 milliGRAM(s) Oral every 6 hours PRN  baclofen 5 milliGRAM(s) Oral every 8 hours PRN  dextrose Oral Gel 15 Gram(s) Oral once PRN  metoclopramide Injectable 10 milliGRAM(s) IV Push every 6 hours PRN  polyethylene glycol 3350 17 Gram(s) Oral two times a day PRN  sodium chloride 0.9% lock flush 10 milliLiter(s) IV Push every 1 hour PRN    Vital Signs Last 24 Hrs      PHYSICAL EXAM  General: adult in NAD  HEENT: clear oropharynx, anicteric sclera  CV: normal S1/S2 RRR  Lungs: positive air movement b/l ant lungs, clear to auscultation, no wheezes, no rales  Abdomen: soft, + BS,  non-tender non-distended,   Ext: no edema  Skin: no rash  Neuro: alert and oriented X 2, no focal deficits  Central Line:  PICC CDI    LABS:                   CAPILLARY BLOOD GLUCOSE  POCT Blood Glucose.: 144 mg/dL (01 Jan 2025 08:51)  POCT Blood Glucose.: 176 mg/dL (01 Jan 2025 03:28)  POCT Blood Glucose.: 208 mg/dL (31 Dec 2024 20:57)  POCT Blood Glucose.: 183 mg/dL (31 Dec 2024 17:58)  POCT Blood Glucose.: 201 mg/dL (31 Dec 2024 15:16)  POCT Blood Glucose.: 265 mg/dL (31 Dec 2024 12:21)    LIVER FUNCTIONS - ( 01 Jan 2025 07:18 )  Alb: 2.5 g/dL / Pro: 5.6 g/dL / ALK PHOS: 97 U/L / ALT: 12 U/L / AST: 15 U/L / GGT: x           Urinalysis Basic - ( 01 Jan 2025 07:18 )  Color: x / Appearance: x / SG: x / pH: x  Gluc: 161 mg/dL / Ketone: x  / Bili: x / Urobili: x   Blood: x / Protein: x / Nitrite: x   Leuk Esterase: x / RBC: x / WBC x   Sq Epi: x / Non Sq Epi: x / Bacteria: x    Cerebrospinal Fluid Cell Count-1 (12.31.24 @ 14:05)    CSF Color: No Color   Tube Type: Tube 4   CSF Appearance: Clear   CSF Neutrophils: See Note: Mononuclear cells only present on review of cytospin.   Total Nucleated Cell Count, CSF: <1 /uL   RBC Count - Spinal Fluid: 1 /uL    -----------------    Cultures: no recent    -----------------    RADIOLOGY & ADDITIONAL STUDIES:  from: Xray Chest 1 View- PORTABLE-Urgent (Xray Chest 1 View- PORTABLE-Urgent .) (12.28.24 @ 14:48)   FINDINGS:  Left-sided PICC line with tip in the SVC.  The heart is normal in size.  No focal consolidation.  There is no pneumothorax or pleural effusion.  No acute osseous abnormalities. Chronic left-sided rib fractures. Chronic   left humeral neck fracture.    IMPRESSION:  No focal consolidation.

## 2025-01-01 NOTE — PROGRESS NOTE ADULT - ASSESSMENT
69-year-old male with PMHx of DM, HTN, HLD, ESRD on HD s/p LKRT 2012 (from brother), hypothyroidism, recent admission to [facility] 11/30/24-12/18/24 for AMS found to have sacral OM and E. Coli bacteremia s/p treatment with meropenam till 12/10, complicated by hypercalcemia  (s/p calcitonin, aredia, and started on prednisone by nephrology), found to have new DLBCL on liver biopsy 12/16/24 transferred to [facility] from rehab to start chemo with  Mini R-CHOP. Treatment started 12/28. Hospital course complicated by hypercalcemia and steroid induced hyperglycemia. Pt has anemia and leukopenia from disease.  69-year-old male with PMHx of DM, HTN, HLD, ESRD on HD s/p LKRT 2012 (from brother), hypothyroidism, recent admission to [facility] 11/30/24-12/18/24 for AMS found to have sacral OM and E. Coli bacteremia s/p treatment with meropenem thru 12/10, complicated by hypercalcemia  (s/p calcitonin, aredia, and started on prednisone by nephrology), found to have new DLBCL on liver biopsy 12/16/24 transferred to [facility] from rehab to start chemo with  Mini R-CHOP. Treatment started 12/28. Hospital course complicated by hypercalcemia and steroid induced hyperglycemia. Pt has anemia and leukopenia from disease.

## 2025-01-02 ENCOUNTER — TRANSCRIPTION ENCOUNTER (OUTPATIENT)
Age: 70
End: 2025-01-02

## 2025-01-02 VITALS
RESPIRATION RATE: 18 BRPM | OXYGEN SATURATION: 96 % | HEART RATE: 57 BPM | SYSTOLIC BLOOD PRESSURE: 155 MMHG | DIASTOLIC BLOOD PRESSURE: 67 MMHG | TEMPERATURE: 98 F

## 2025-01-02 LAB
ACANTHOCYTES BLD QL SMEAR: SLIGHT — SIGNIFICANT CHANGE UP
ALBUMIN SERPL ELPH-MCNC: 2.7 G/DL — LOW (ref 3.3–5)
ALP SERPL-CCNC: 98 U/L — SIGNIFICANT CHANGE UP (ref 40–120)
ALT FLD-CCNC: 14 U/L — SIGNIFICANT CHANGE UP (ref 10–45)
ANION GAP SERPL CALC-SCNC: 10 MMOL/L — SIGNIFICANT CHANGE UP (ref 5–17)
ANISOCYTOSIS BLD QL: SLIGHT — SIGNIFICANT CHANGE UP
AST SERPL-CCNC: 20 U/L — SIGNIFICANT CHANGE UP (ref 10–40)
BASOPHILS # BLD AUTO: 0 K/UL — SIGNIFICANT CHANGE UP (ref 0–0.2)
BASOPHILS NFR BLD AUTO: 0 % — SIGNIFICANT CHANGE UP (ref 0–2)
BILIRUB SERPL-MCNC: 0.5 MG/DL — SIGNIFICANT CHANGE UP (ref 0.2–1.2)
BUN SERPL-MCNC: 27 MG/DL — HIGH (ref 7–23)
BURR CELLS BLD QL SMEAR: PRESENT — SIGNIFICANT CHANGE UP
CA-I BLD-SCNC: 1.33 MMOL/L — SIGNIFICANT CHANGE UP (ref 1.15–1.33)
CALCIUM SERPL-MCNC: 9.2 MG/DL — SIGNIFICANT CHANGE UP (ref 8.4–10.5)
CHLORIDE SERPL-SCNC: 108 MMOL/L — SIGNIFICANT CHANGE UP (ref 96–108)
CO2 SERPL-SCNC: 23 MMOL/L — SIGNIFICANT CHANGE UP (ref 22–31)
CREAT SERPL-MCNC: 0.63 MG/DL — SIGNIFICANT CHANGE UP (ref 0.5–1.3)
DACRYOCYTES BLD QL SMEAR: SLIGHT — SIGNIFICANT CHANGE UP
EGFR: 104 ML/MIN/1.73M2 — SIGNIFICANT CHANGE UP
ELLIPTOCYTES BLD QL SMEAR: SLIGHT — SIGNIFICANT CHANGE UP
EOSINOPHIL # BLD AUTO: 0 K/UL — SIGNIFICANT CHANGE UP (ref 0–0.5)
EOSINOPHIL NFR BLD AUTO: 0 % — SIGNIFICANT CHANGE UP (ref 0–6)
GLUCOSE BLDC GLUCOMTR-MCNC: 105 MG/DL — HIGH (ref 70–99)
GLUCOSE BLDC GLUCOMTR-MCNC: 119 MG/DL — HIGH (ref 70–99)
GLUCOSE BLDC GLUCOMTR-MCNC: 135 MG/DL — HIGH (ref 70–99)
GLUCOSE SERPL-MCNC: 108 MG/DL — HIGH (ref 70–99)
HCT VFR BLD CALC: 28.2 % — LOW (ref 39–50)
HGB BLD-MCNC: 9.2 G/DL — LOW (ref 13–17)
HYPOSEGMENTATION: PRESENT — SIGNIFICANT CHANGE UP
LDH SERPL L TO P-CCNC: 269 U/L — HIGH (ref 50–242)
LYMPHOCYTES # BLD AUTO: 0.25 K/UL — LOW (ref 1–3.3)
LYMPHOCYTES # BLD AUTO: 1.7 % — LOW (ref 13–44)
MACROCYTES BLD QL: SLIGHT — SIGNIFICANT CHANGE UP
MAGNESIUM SERPL-MCNC: 2 MG/DL — SIGNIFICANT CHANGE UP (ref 1.6–2.6)
MANUAL SMEAR VERIFICATION: SIGNIFICANT CHANGE UP
MCHC RBC-ENTMCNC: 30.3 PG — SIGNIFICANT CHANGE UP (ref 27–34)
MCHC RBC-ENTMCNC: 32.6 G/DL — SIGNIFICANT CHANGE UP (ref 32–36)
MCV RBC AUTO: 92.8 FL — SIGNIFICANT CHANGE UP (ref 80–100)
MONOCYTES # BLD AUTO: 0.25 K/UL — SIGNIFICANT CHANGE UP (ref 0–0.9)
MONOCYTES NFR BLD AUTO: 1.7 % — LOW (ref 2–14)
NEUTROPHILS # BLD AUTO: 14.12 K/UL — HIGH (ref 1.8–7.4)
NEUTROPHILS NFR BLD AUTO: 93.2 % — HIGH (ref 43–77)
NEUTS BAND # BLD: 3.4 % — SIGNIFICANT CHANGE UP (ref 0–8)
NON-GYNECOLOGICAL CYTOLOGY STUDY: SIGNIFICANT CHANGE UP
OVALOCYTES BLD QL SMEAR: SLIGHT — SIGNIFICANT CHANGE UP
PHOSPHATE SERPL-MCNC: 2.9 MG/DL — SIGNIFICANT CHANGE UP (ref 2.5–4.5)
PLAT MORPH BLD: NORMAL — SIGNIFICANT CHANGE UP
PLATELET # BLD AUTO: 201 K/UL — SIGNIFICANT CHANGE UP (ref 150–400)
POIKILOCYTOSIS BLD QL AUTO: SIGNIFICANT CHANGE UP
POTASSIUM SERPL-MCNC: 4.8 MMOL/L — SIGNIFICANT CHANGE UP (ref 3.5–5.3)
POTASSIUM SERPL-SCNC: 4.8 MMOL/L — SIGNIFICANT CHANGE UP (ref 3.5–5.3)
PROT SERPL-MCNC: 5.3 G/DL — LOW (ref 6–8.3)
RBC # BLD: 3.04 M/UL — LOW (ref 4.2–5.8)
RBC # FLD: 19.9 % — HIGH (ref 10.3–14.5)
RBC BLD AUTO: ABNORMAL
SCHISTOCYTES BLD QL AUTO: SLIGHT — SIGNIFICANT CHANGE UP
SODIUM SERPL-SCNC: 141 MMOL/L — SIGNIFICANT CHANGE UP (ref 135–145)
TARGETS BLD QL SMEAR: SLIGHT — SIGNIFICANT CHANGE UP
TM INTERPRETATION: SIGNIFICANT CHANGE UP
TOXIC GRANULES BLD QL SMEAR: PRESENT — SIGNIFICANT CHANGE UP
URATE SERPL-MCNC: 3.2 MG/DL — LOW (ref 3.4–8.8)
WBC # BLD: 14.62 K/UL — HIGH (ref 3.8–10.5)
WBC # FLD AUTO: 14.62 K/UL — HIGH (ref 3.8–10.5)

## 2025-01-02 PROCEDURE — 82955 ASSAY OF G6PD ENZYME: CPT

## 2025-01-02 PROCEDURE — C1751: CPT

## 2025-01-02 PROCEDURE — 86664 EPSTEIN-BARR NUCLEAR ANTIGEN: CPT

## 2025-01-02 PROCEDURE — 86665 EPSTEIN-BARR CAPSID VCA: CPT

## 2025-01-02 PROCEDURE — 80053 COMPREHEN METABOLIC PANEL: CPT

## 2025-01-02 PROCEDURE — 84480 ASSAY TRIIODOTHYRONINE (T3): CPT

## 2025-01-02 PROCEDURE — 87077 CULTURE AEROBIC IDENTIFY: CPT

## 2025-01-02 PROCEDURE — 84443 ASSAY THYROID STIM HORMONE: CPT

## 2025-01-02 PROCEDURE — 81001 URINALYSIS AUTO W/SCOPE: CPT

## 2025-01-02 PROCEDURE — A9560: CPT

## 2025-01-02 PROCEDURE — 86900 BLOOD TYPING SEROLOGIC ABO: CPT

## 2025-01-02 PROCEDURE — 86663 EPSTEIN-BARR ANTIBODY: CPT

## 2025-01-02 PROCEDURE — G0480: CPT

## 2025-01-02 PROCEDURE — 96450 CHEMOTHERAPY INTO CNS: CPT

## 2025-01-02 PROCEDURE — 36430 TRANSFUSION BLD/BLD COMPNT: CPT

## 2025-01-02 PROCEDURE — 82010 KETONE BODYS QUAN: CPT

## 2025-01-02 PROCEDURE — 82803 BLOOD GASES ANY COMBINATION: CPT

## 2025-01-02 PROCEDURE — 85025 COMPLETE CBC W/AUTO DIFF WBC: CPT

## 2025-01-02 PROCEDURE — 86704 HEP B CORE ANTIBODY TOTAL: CPT

## 2025-01-02 PROCEDURE — 80197 ASSAY OF TACROLIMUS: CPT

## 2025-01-02 PROCEDURE — 82330 ASSAY OF CALCIUM: CPT

## 2025-01-02 PROCEDURE — 97162 PT EVAL MOD COMPLEX 30 MIN: CPT

## 2025-01-02 PROCEDURE — 84157 ASSAY OF PROTEIN OTHER: CPT

## 2025-01-02 PROCEDURE — 83605 ASSAY OF LACTIC ACID: CPT

## 2025-01-02 PROCEDURE — 82310 ASSAY OF CALCIUM: CPT

## 2025-01-02 PROCEDURE — 82306 VITAMIN D 25 HYDROXY: CPT

## 2025-01-02 PROCEDURE — 83735 ASSAY OF MAGNESIUM: CPT

## 2025-01-02 PROCEDURE — 83615 LACTATE (LD) (LDH) ENZYME: CPT

## 2025-01-02 PROCEDURE — 88108 CYTOPATH CONCENTRATE TECH: CPT

## 2025-01-02 PROCEDURE — 87389 HIV-1 AG W/HIV-1&-2 AB AG IA: CPT

## 2025-01-02 PROCEDURE — 87086 URINE CULTURE/COLONY COUNT: CPT

## 2025-01-02 PROCEDURE — 84436 ASSAY OF TOTAL THYROXINE: CPT

## 2025-01-02 PROCEDURE — 83036 HEMOGLOBIN GLYCOSYLATED A1C: CPT

## 2025-01-02 PROCEDURE — 87205 SMEAR GRAM STAIN: CPT

## 2025-01-02 PROCEDURE — 86803 HEPATITIS C AB TEST: CPT

## 2025-01-02 PROCEDURE — 84439 ASSAY OF FREE THYROXINE: CPT

## 2025-01-02 PROCEDURE — 84550 ASSAY OF BLOOD/URIC ACID: CPT

## 2025-01-02 PROCEDURE — 82652 VIT D 1 25-DIHYDROXY: CPT

## 2025-01-02 PROCEDURE — P9040: CPT

## 2025-01-02 PROCEDURE — 85384 FIBRINOGEN ACTIVITY: CPT

## 2025-01-02 PROCEDURE — 78472 GATED HEART PLANAR SINGLE: CPT | Mod: MC

## 2025-01-02 PROCEDURE — 99232 SBSQ HOSP IP/OBS MODERATE 35: CPT

## 2025-01-02 PROCEDURE — 87186 SC STD MICRODIL/AGAR DIL: CPT

## 2025-01-02 PROCEDURE — 85730 THROMBOPLASTIN TIME PARTIAL: CPT

## 2025-01-02 PROCEDURE — 86901 BLOOD TYPING SEROLOGIC RH(D): CPT

## 2025-01-02 PROCEDURE — 89051 BODY FLUID CELL COUNT: CPT

## 2025-01-02 PROCEDURE — 36569 INSJ PICC 5 YR+ W/O IMAGING: CPT

## 2025-01-02 PROCEDURE — 80074 ACUTE HEPATITIS PANEL: CPT

## 2025-01-02 PROCEDURE — 99239 HOSP IP/OBS DSCHRG MGMT >30: CPT

## 2025-01-02 PROCEDURE — 88185 FLOWCYTOMETRY/TC ADD-ON: CPT

## 2025-01-02 PROCEDURE — 82947 ASSAY GLUCOSE BLOOD QUANT: CPT

## 2025-01-02 PROCEDURE — 88184 FLOWCYTOMETRY/ TC 1 MARKER: CPT

## 2025-01-02 PROCEDURE — 86923 COMPATIBILITY TEST ELECTRIC: CPT

## 2025-01-02 PROCEDURE — 82945 GLUCOSE OTHER FLUID: CPT

## 2025-01-02 PROCEDURE — 86706 HEP B SURFACE ANTIBODY: CPT

## 2025-01-02 PROCEDURE — 36415 COLL VENOUS BLD VENIPUNCTURE: CPT

## 2025-01-02 PROCEDURE — 71045 X-RAY EXAM CHEST 1 VIEW: CPT

## 2025-01-02 PROCEDURE — 82610 CYSTATIN C: CPT

## 2025-01-02 PROCEDURE — 74018 RADEX ABDOMEN 1 VIEW: CPT

## 2025-01-02 PROCEDURE — 82962 GLUCOSE BLOOD TEST: CPT

## 2025-01-02 PROCEDURE — 83970 ASSAY OF PARATHORMONE: CPT

## 2025-01-02 PROCEDURE — 86850 RBC ANTIBODY SCREEN: CPT

## 2025-01-02 PROCEDURE — 85610 PROTHROMBIN TIME: CPT

## 2025-01-02 PROCEDURE — 93005 ELECTROCARDIOGRAM TRACING: CPT

## 2025-01-02 PROCEDURE — 84100 ASSAY OF PHOSPHORUS: CPT

## 2025-01-02 PROCEDURE — 85379 FIBRIN DEGRADATION QUANT: CPT

## 2025-01-02 PROCEDURE — 82248 BILIRUBIN DIRECT: CPT

## 2025-01-02 RX ORDER — HYDRALAZINE HYDROCHLORIDE 10 MG/1
1 TABLET ORAL
Qty: 0 | Refills: 0 | DISCHARGE
Start: 2025-01-02

## 2025-01-02 RX ORDER — BISACODYL 5 MG
5 TABLET, DELAYED RELEASE (ENTERIC COATED) ORAL ONCE
Refills: 0 | Status: COMPLETED | OUTPATIENT
Start: 2025-01-02 | End: 2025-01-02

## 2025-01-02 RX ORDER — CLONIDINE HYDROCHLORIDE 0.2 MG/1
0.1 TABLET ORAL THREE TIMES A DAY
Refills: 0 | Status: DISCONTINUED | OUTPATIENT
Start: 2025-01-02 | End: 2025-01-02

## 2025-01-02 RX ORDER — CARVEDILOL 25 MG/1
1 TABLET, FILM COATED ORAL
Qty: 0 | Refills: 0 | DISCHARGE
Start: 2025-01-02

## 2025-01-02 RX ORDER — SENNOSIDES 8.6 MG/1
2 TABLET, FILM COATED ORAL
Qty: 0 | Refills: 0 | DISCHARGE
Start: 2025-01-02

## 2025-01-02 RX ORDER — INSULIN LISPRO 100/ML
18 VIAL (ML) SUBCUTANEOUS
Qty: 0 | Refills: 0 | DISCHARGE
Start: 2025-01-02

## 2025-01-02 RX ORDER — DEXTROSE MONOHYDRATE 25 G/50ML
1 INJECTION, SOLUTION INTRAVENOUS
Qty: 0 | Refills: 0 | DISCHARGE
Start: 2025-01-02

## 2025-01-02 RX ADMIN — Medication 18 UNIT(S): at 12:40

## 2025-01-02 RX ADMIN — Medication 5 MILLIGRAM(S): at 12:39

## 2025-01-02 RX ADMIN — HYDRALAZINE HYDROCHLORIDE 100 MILLIGRAM(S): 10 TABLET ORAL at 05:36

## 2025-01-02 RX ADMIN — CLONIDINE HYDROCHLORIDE 0.1 MILLIGRAM(S): 0.2 TABLET ORAL at 13:42

## 2025-01-02 RX ADMIN — Medication 17 GRAM(S): at 10:40

## 2025-01-02 RX ADMIN — Medication 10 MILLIGRAM(S): at 05:35

## 2025-01-02 RX ADMIN — CHLORHEXIDINE GLUCONATE 1 APPLICATION(S): 1.2 RINSE ORAL at 10:41

## 2025-01-02 RX ADMIN — BUPROPION HYDROCHLORIDE 300 MILLIGRAM(S): 150 TABLET, EXTENDED RELEASE ORAL at 10:40

## 2025-01-02 RX ADMIN — HEPARIN SODIUM 5000 UNIT(S): 1000 INJECTION, SOLUTION INTRAVENOUS; SUBCUTANEOUS at 05:35

## 2025-01-02 RX ADMIN — THIAMINE HYDROCHLORIDE 100 MILLIGRAM(S): 100 INJECTION, SOLUTION INTRAMUSCULAR; INTRAVENOUS at 10:40

## 2025-01-02 RX ADMIN — Medication 80 MILLIGRAM(S): at 10:39

## 2025-01-02 RX ADMIN — ALLOPURINOL 100 MILLIGRAM(S): 100 TABLET ORAL at 10:39

## 2025-01-02 RX ADMIN — HYDRALAZINE HYDROCHLORIDE 100 MILLIGRAM(S): 10 TABLET ORAL at 13:43

## 2025-01-02 RX ADMIN — LISINOPRIL 20 MILLIGRAM(S): 30 TABLET ORAL at 05:35

## 2025-01-02 RX ADMIN — LEVOTHYROXINE SODIUM 88 MICROGRAM(S): 175 TABLET ORAL at 05:35

## 2025-01-02 RX ADMIN — Medication 18 UNIT(S): at 08:43

## 2025-01-02 RX ADMIN — ESCITALOPRAM OXALATE 10 MILLIGRAM(S): 10 TABLET ORAL at 10:39

## 2025-01-02 RX ADMIN — POTASSIUM CHLORIDE 20 MILLIEQUIVALENT(S): 600 TABLET, FILM COATED, EXTENDED RELEASE ORAL at 05:35

## 2025-01-02 RX ADMIN — CARVEDILOL 12.5 MILLIGRAM(S): 25 TABLET, FILM COATED ORAL at 10:39

## 2025-01-02 NOTE — DISCHARGE NOTE NURSING/CASE MANAGEMENT/SOCIAL WORK - FINANCIAL ASSISTANCE
[facility] provides services at a reduced cost to those who are determined to be eligible through [facility]’s financial assistance program. Information regarding [facility]’s financial assistance program can be found by going to [email or web address] or by calling [phone].

## 2025-01-02 NOTE — PROGRESS NOTE ADULT - PROBLEM SELECTOR PROBLEM 4
Hypothyroidism
Peripheral neuropathy
show

## 2025-01-02 NOTE — PROGRESS NOTE ADULT - PROBLEM SELECTOR PROBLEM 5
Hyperlipidemia
Diabetes mellitus
Hyperlipidemia
Hyperlipidemia
Diabetes mellitus

## 2025-01-02 NOTE — PROGRESS NOTE ADULT - PROBLEM SELECTOR PROBLEM 2
Steroid-induced hyperglycemia
Infectious disease

## 2025-01-02 NOTE — PROGRESS NOTE ADULT - NS ATTEND AMEND GEN_ALL_CORE FT
70yo M w/ h/o renal transplant on tacro, now with newly diagnosed DLBCL. Admitted for initiation of treatment w/ mini R-CHOP. Today is C1 D4.    Hx: renal transplant in 2012 2/2 DM, s/p left nephrectomy, peripheral neuropathy, HTN, HLD, ANGELIKA    Heme:  Rituxan 12/28  mini CHOP on 12/29   PICC placed given difficult access  MUGA EF 72%  Cont allopurinol, IVF  TLS labs q12  LP with IT MTX done 12/31 -f/u results    Renal:  Cont tacrolimus and prednisone  transplant nephrology consulted   Hypercalcemia - calcitonin x2 12/28. Was previously given pamidronate x2 at [facility] (12/6 and ?12/18)    ID:   not neutropenic    Dispo:  Patient is bedbound at this point, unclear etiology based on historical documentation right now.   Back to rehab Thursday 1/2.   Fulphila on 1/1. 70yo M w/ h/o renal transplant on tacro, now with newly diagnosed DLBCL. Admitted for initiation of treatment w/ mini R-CHOP. Today is C1 D6.    Hx: renal transplant in 2012 2/2 DM, s/p left nephrectomy, peripheral neuropathy, HTN, HLD, ANGELIKA    Heme:  Rituxan 12/28  mini CHOP on 12/29   PICC placed given difficult access  MUGA EF 72%  Cont allopurinol, IVF  TLS labs q12  LP with IT MTX done 12/31 -f/u results    Renal:  Cont tacrolimus and prednisone  transplant nephrology consulted   Hypercalcemia - calcitonin x2 12/28. Was previously given pamidronate x2 at [facility] (12/6 and ?12/18)    ID:   not neutropenic    Dispo:  Patient is bedbound at this point, unclear etiology based on historical documentation right now.   Back to rehab today 1/2.   Fulphila on 1/1.  Due for next cycle of therapy 1/27.

## 2025-01-02 NOTE — PROGRESS NOTE ADULT - PROBLEM SELECTOR PROBLEM 1
Type 2 diabetes mellitus with hyperglycemia
DLBCL (diffuse large B cell lymphoma)
Type 2 diabetes mellitus with hyperglycemia
Type 2 diabetes mellitus with hyperglycemia
DLBCL (diffuse large B cell lymphoma)

## 2025-01-02 NOTE — PROGRESS NOTE ADULT - ASSESSMENT
The patient is a 69y Male with PMH of T2DM, hypothyroidism, ESRD s/p renal transplant who was transferred to this hospital for treatment of DLBCL.  Endocrinology consulted for hyperglycemia    #Type 2 Diabetes Mellitus complicated by neuropathy with  #Steroid-induced hyperglycemia  - Follows with: Dr. Romero in [place]  - A1C with Estimated Average Glucose Result: 7.8 % (12-29-24)  - home regimen: glargine 15 units qhs, lispro 15 units TIDAC  - eGFR: 96 mL/min/1.73m2 (12-29-24)  - Weight (kg): 76.4 (12-27-24)  - glucose was elevated, no evidence of DKA on labs  - Of note, patient received  mg x1, then 5 days of prednisone 80 mg up to and including late this morning. Will now be going back on 20mg prednisone daily.      INPATIENT PLAN:  - Recommend continue Lantus 34 units QHS (Do not hold if NPO)  - Recommend continue Admelog 18 units TID before meals (HOLD if NPO or not eating)  - Recommend c/w moderate dose admelog correction scale TIDac and separate moderate dose scale QHS  - AS STEROID DOSE IS DECREASING FOUR FOLD, WILL LIKELY NEED TO LOWER INSULIN DOSES. PLEASE MONITOR BG CLOSELY.  - Please check FSG before meals and QHS, or q6h while NPO  - Inpatient glucose goals: 100-180  - consistent carb diet      DISCHARGE PLANNING:  - Going to rehab today, continue above regimen as BG well-controlled and received prednisone 80mg again this morning. As steroid dose is decreasing four fold tomorrow, will likely need to lower insulin doses. PLEASE MONITOR BG CLOSELY.  - will need Endocrinology follow up with his provider Dr. Romero    #Hypothyroidism  - TSH 0.36, tT3 34, tT4 6.3, free T4 1.3  - continue levothyroxine 88 mcg daily    #Hypercalcemia, non-PTH mediated  D1,25OH elevated, likely hypercalcemia of malignancy secondary to DLBCL which is treated with steroids  - corrected calcium normal  - S/p calcitonin X2 on 12/28 and 12/29   - received 2 doses of pamidronate x2 at [place] (12/6 and ?12/18)  - Finished 5 days of prednisone 80mg daily, now going back down to 20mg daily    #Hyperlipidemia  - LDL goal <70  - on crestor 10 mg daily at home  - check lipid panel as outpatient    Casimiro Richstein, DO  Endocrinology    [address] [place] [address]  [phone]    [address], [address], [place] [address]  [phone]    Please note that a different Endocrine provider will be following this patient tomorrow.

## 2025-01-02 NOTE — PROGRESS NOTE ADULT - PROBLEM SELECTOR PLAN 10
Heparin sc DVT ppx, d/c when PLT <50K  Encourage OOB and ambulation  PT consult     Miralax PRN constipation     HCP   #1Wife Ludmila cell [phone]  #2 Son Brain [phone]   D/W pt, his wife and son, Pt is full code, Molst form voided
Heparin sc DVT ppx, d/c when PLT <50K  Encourage OOB and ambulation  PT consult     Miralax PRN constipation     HCP   #1Wife Ludmila cell [phone]  #2 Son Brain [phone]   D/W pt, his wife and son, Pt is full code, Molst form voided
Heparin sc DVT ppx, d/c when PLT <50K  Encourage OOB and ambulation  PT consult     Miralax PRN constipation     HCP   #1Wife Ludmila cell [phone]  #2 Son Brain [phone]   D/W pt, his wife and son, Pt is full code, Molst form voided
Heparin sc DVT ppx, d/c when PLT <50K  Encourage OOB and ambulation  PT consult     Miralax PRN constipation     HCP   #1Wife Ludmila cell [phone]  #2 Son Brain [phone]   D/W pt, his wife and son, Pt is full code, Molst form voided
Heparin sc DVT ppx, d/c when PLT <50K  Encourage OOB and ambulation  PT consult     Miralax PRN constipation     HCP   #1Wife Ludmila cell [phone]  #2 Son Brain [phone]   D/W pt, his wife and son, Pt is full code, Molst form voided
Heparin sc DVT ppx, d/c when PLT <50K  Encourage OOB and ambulation  PT consult     Miralax PRN constipation     HCP   #1Wife Ludmila cell [phone]  #2 Son Brain [phone]   D/W pt, his wife and son, Pt is full code, Molst form voided

## 2025-01-02 NOTE — PROGRESS NOTE ADULT - PROBLEM SELECTOR PLAN 9
on bupropion  mg daily, escitalopram 10 mg daily

## 2025-01-02 NOTE — PROGRESS NOTE ADULT - PROBLEM SELECTOR PROBLEM 6
History of renal transplant

## 2025-01-02 NOTE — PROGRESS NOTE ADULT - SUBJECTIVE AND OBJECTIVE BOX
Chief Complaint: DLBCL    History: Patient eating well. BG well controlled. Received prednisone 80mg daily today, day 5 of 5. Going back to 20mg prednisone daily tomorrow.    MEDICATIONS  (STANDING):  amLODIPine   Tablet 10 milliGRAM(s) Oral daily  buPROPion XL (24-Hour) . 300 milliGRAM(s) Oral daily  carvedilol 12.5 milliGRAM(s) Oral every 12 hours  chlorhexidine 2% Cloths 1 Application(s) Topical <User Schedule>  cloNIDine 0.1 milliGRAM(s) Oral three times a day  dextrose 5%. 1000 milliLiter(s) (50 mL/Hr) IV Continuous <Continuous>  dextrose 5%. 1000 milliLiter(s) (50 mL/Hr) IV Continuous <Continuous>  dextrose 5%. 1000 milliLiter(s) (100 mL/Hr) IV Continuous <Continuous>  dextrose 50% Injectable 25 Gram(s) IV Push once  dextrose 50% Injectable 12.5 Gram(s) IV Push once  dextrose 50% Injectable 25 Gram(s) IV Push once  dextrose 50% Injectable 25 Gram(s) IV Push once  escitalopram 10 milliGRAM(s) Oral daily  glucagon  Injectable 1 milliGRAM(s) IntraMuscular once  heparin   Injectable 5000 Unit(s) SubCutaneous every 12 hours  hydrALAZINE 100 milliGRAM(s) Oral every 8 hours  insulin glargine Injectable (LANTUS) 34 Unit(s) SubCutaneous at bedtime  insulin lispro (ADMELOG) corrective regimen sliding scale   SubCutaneous three times a day before meals  insulin lispro (ADMELOG) corrective regimen sliding scale   SubCutaneous at bedtime  insulin lispro Injectable (ADMELOG) 18 Unit(s) SubCutaneous three times a day before meals  levothyroxine 88 MICROGram(s) Oral daily  lisinopril 20 milliGRAM(s) Oral daily  methotrexate PF IntraThecal (eMAR) 15 milliGRAM(s) IntraThecal once  polyethylene glycol 3350 17 Gram(s) Oral daily  potassium chloride    Tablet ER 20 milliEquivalent(s) Oral two times a day  senna 2 Tablet(s) Oral at bedtime  sodium chloride 0.9%. 1000 milliLiter(s) (20 mL/Hr) IV Continuous <Continuous>  thiamine Injectable 100 milliGRAM(s) IV Push daily    MEDICATIONS  (PRN):  acetaminophen     Tablet .. 650 milliGRAM(s) Oral every 6 hours PRN Temp greater or equal to 38C (100.4F), Mild Pain (1 - 3)  baclofen 5 milliGRAM(s) Oral every 8 hours PRN Muscle Spasm  dextrose Oral Gel 15 Gram(s) Oral once PRN Blood Glucose LESS THAN 70 milliGRAM(s)/deciliter  metoclopramide Injectable 10 milliGRAM(s) IV Push every 6 hours PRN nausea/vomiting  polyethylene glycol 3350 17 Gram(s) Oral two times a day PRN Constipation  sodium chloride 0.9% lock flush 10 milliLiter(s) IV Push every 1 hour PRN Pre/post blood products, medications, blood draw, and to maintain line patency      Allergies    No Known Allergies    Intolerances      PHYSICAL EXAM:  VITALS: T(C): 36.6 (01-02-25 @ 13:00)  T(F): 97.8 (01-02-25 @ 13:00), Max: 97.8 (01-01-25 @ 14:18)  HR: 57 (01-02-25 @ 13:00) (57 - 79)  BP: 155/67 (01-02-25 @ 13:00) (104/63 - 177/74)  RR:  (18 - 18)  SpO2:  (96% - 98%)  Wt(kg): --  General: Well-developed male, No acute distress, Speaking full sentences.   Eye:  Extraocular movements are intact, No scleral icterus.   Respiratory:  Respirations are non-labored, Symmetric chest wall expansion.    POCT Blood Glucose.: 119 mg/dL (01-02-25 @ 12:18)  POCT Blood Glucose.: 105 mg/dL (01-02-25 @ 08:34)  POCT Blood Glucose.: 135 mg/dL (01-02-25 @ 02:08)  POCT Blood Glucose.: 120 mg/dL (01-01-25 @ 21:57)  POCT Blood Glucose.: 107 mg/dL (01-01-25 @ 21:22)  POCT Blood Glucose.: 127 mg/dL (01-01-25 @ 17:38)  POCT Blood Glucose.: 146 mg/dL (01-01-25 @ 12:12)  POCT Blood Glucose.: 144 mg/dL (01-01-25 @ 08:51)  POCT Blood Glucose.: 176 mg/dL (01-01-25 @ 03:28)  POCT Blood Glucose.: 208 mg/dL (12-31-24 @ 20:57)  POCT Blood Glucose.: 183 mg/dL (12-31-24 @ 17:58)  POCT Blood Glucose.: 201 mg/dL (12-31-24 @ 15:16)  POCT Blood Glucose.: 265 mg/dL (12-31-24 @ 12:21)  POCT Blood Glucose.: 285 mg/dL (12-31-24 @ 08:23)  POCT Blood Glucose.: 316 mg/dL (12-31-24 @ 01:18)  POCT Blood Glucose.: 315 mg/dL (12-31-24 @ 01:15)  POCT Blood Glucose.: 301 mg/dL (12-30-24 @ 21:10)  POCT Blood Glucose.: 323 mg/dL (12-30-24 @ 17:15)      01-02    141  |  108  |  27[H]  ----------------------------<  108[H]  4.8   |  23  |  0.63    eGFR: 104    Ca    9.2      01-02  Mg     2.0     01-02  Phos  2.9     01-02    TPro  5.3[L]  /  Alb  2.7[L]  /  TBili  0.5  /  DBili  x   /  AST  20  /  ALT  14  /  AlkPhos  98  01-02          Thyroid Function Tests:  12-31 @ 07:04 TSH 0.55 FreeT4 1.2 T3 -- Anti TPO -- Anti Thyroglobulin Ab -- TSI --  12-30 @ 09:53 TSH -- FreeT4 1.3 T3 -- Anti TPO -- Anti Thyroglobulin Ab -- TSI --

## 2025-01-02 NOTE — PROGRESS NOTE ADULT - PROBLEM SELECTOR PLAN 6
s/p LKRT 2012 and L native nephrectomy.   Per Hillary ZEPEDA/[facility], pt. baseline SCR ~1.0. Pt. SCr is at baseline.   - Obtain U/A  - Monitor labs and I/Os. Avoid nephrotoxins including, ACE/ARB, NSAIDs, contrast, etc. Dose medications as per eGFR.   2. Immunosuppression  Pt. is on Tacrolimus 2mg AM/1mg PM, imuran 50mg BID, and prednisone 20mg for hypercalcemia.   - Continue prednisone   - Hold imuran in setting of recent infection and chemotherapy.   3. Hypercalcemia  Calcium on admission to [facility] 11.8. Likely related to new DLBCL diagnosis.   - Recommend Vitamin D 1,25 and 25-OH, PTH, ionized Ca  - Recommend maintenance  IVFs for now  - Recommend getting results from [facility] regarding dates of pamidronate and calcitonin treatment.  12/28 tacro level 8.1, keep same dose  12/30: Stopped tacrolimus, as pt. currently receiving cytoxan as part of R-CHOP regimen.

## 2025-01-02 NOTE — PROGRESS NOTE ADULT - ASSESSMENT
69-year-old male with PMHx of DM, HTN, HLD, ESRD on HD s/p LKRT 2012 (from brother), hypothyroidism, recent admission to [facility] 11/30/24-12/18/24 for AMS found to have sacral OM and E. Coli bacteremia s/p treatment with meropenem thru 12/10, complicated by hypercalcemia  (s/p calcitonin, aredia, and started on prednisone by nephrology), found to have new DLBCL on liver biopsy 12/16/24 transferred to [facility] from rehab to start chemo with  Mini R-CHOP. Treatment started 12/28. Hospital course complicated by hypercalcemia and steroid induced hyperglycemia. Pt has anemia and leukopenia from disease.

## 2025-01-02 NOTE — PROGRESS NOTE ADULT - PROBLEM SELECTOR PLAN 1
Post transplant lymphoproliferative disorder  5cm liver mass--Prior known, s/p liver bx at [facility] showed 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 also noted  14cm splenomegaly since 2018  repeat MRI abdomen 12/2024: 6.6cm liver mass, 17.7cm splenomegaly, partially visualized  retroperitoneal soft tissue encasing aorta and IVC  Continue  Allopurinol 100 mg daily   Continue  IVF: NS at 100 cc/hr if evidence of TLS or not tolerating PO   HIV/acute hepatitis panel(-), MUGA EF 72%, G6PD level 24  Transfuse if Hgb < 7.0 or PLT <10, <15 if fever, <20 if bleeding, TLS labs BID keep K4, Mg2  12/31 s/p LP with IT MTX - cell counts normal, flow cyto pending results; 1u pRBC for Hgb 6.4; Thiamine challenge  1/1 s/p Fulphila today;   01/02 paln to d/c to rehab  [facility] Patient will not require chemotherapy treatment in Rehab. Post transplant lymphoproliferative disorder  5cm liver mass--Prior known, s/p liver bx at [facility] showed 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 also noted  14cm splenomegaly since 2018  repeat MRI abdomen 12/2024: 6.6cm liver mass, 17.7cm splenomegaly, partially visualized  retroperitoneal soft tissue encasing aorta and IVC  Continue  Allopurinol 100 mg daily   Continue  IVF: NS at 100 cc/hr if evidence of TLS or not tolerating PO   HIV/acute hepatitis panel(-), MUGA EF 72%, G6PD level 24  Transfuse if Hgb < 7.0 or PLT <10, <15 if fever, <20 if bleeding, TLS labs BID keep K4, Mg2  12/31 s/p LP with IT MTX - cell counts normal, flow cyto pending results; 1u pRBC for Hgb 6.4; Thiamine challenge  1/1 s/p Fulphila today;   01/02 paln to d/c to rehab  [facility] Patient will not require chemotherapy treatment in Rehab. Due for next cycle of therapy 1/27.

## 2025-01-02 NOTE — PROGRESS NOTE ADULT - PROBLEM SELECTOR PROBLEM 3
Hypercalcemia due to granulomatous disease
Hypercalcemia due to granulomatous disease
Hypothyroidism
Hypothyroidism
Hypercalcemia due to granulomatous disease
Hypothyroidism

## 2025-01-02 NOTE — PROGRESS NOTE ADULT - PROVIDER SPECIALTY LIST ADULT
Endocrinology
Transplant Nephrology
Endocrinology
Endocrinology
Heme/Onc

## 2025-01-02 NOTE — PROGRESS NOTE ADULT - PROBLEM/PLAN-9
DISPLAY PLAN FREE TEXT
DISPLAY PLAN FREE TEXT
none
DISPLAY PLAN FREE TEXT

## 2025-01-02 NOTE — PROGRESS NOTE ADULT - PROBLEM SELECTOR PLAN 3
on levothyroxine 88 mcg daily

## 2025-01-02 NOTE — DISCHARGE NOTE NURSING/CASE MANAGEMENT/SOCIAL WORK - PATIENT PORTAL LINK FT
You can access the FollowMyHealth Patient Portal offered by [facility] by registering at the following website: [email or web address]. By joining [employer]s FollowMyHealth portal, you will also be able to view your health information using other applications (apps) compatible with our system.

## 2025-01-02 NOTE — PROGRESS NOTE ADULT - SUBJECTIVE AND OBJECTIVE BOX
Diagnosis: DLBCL gcb stubtype (hx renal transplant 2012)    Protocol/Chemo Regimen: cycle 1- R-miniCHOP (Rituximab as day 1, CHOP started on day 2)    Day: 6     Pt endorsed: (more confused per family); no complaints from patient    Review of Systems: Patient denied nausea, vomiting, mouth pain,  chest pain, cough, dyspnea, abdominal pain, constipation, diarrhea, rectal pain,  rash, fatigue, headache, bleeding    Pain scale: denies                                 Location: NA    Diet:  consistent carbo no snack    Allergies: No Known Allergies    HEME/ONC MEDICATIONS  heparin   Injectable 5000 Unit(s) SubCutaneous every 12 hours    STANDING MEDICATIONS  allopurinol 100 milliGRAM(s) Oral daily  amLODIPine   Tablet 10 milliGRAM(s) Oral daily  buPROPion XL (24-Hour) . 300 milliGRAM(s) Oral daily  carvedilol 12.5 milliGRAM(s) Oral every 12 hours  chlorhexidine 2% Cloths 1 Application(s) Topical <User Schedule>  escitalopram 10 milliGRAM(s) Oral daily  glucagon  Injectable 1 milliGRAM(s) IntraMuscular once  hydrALAZINE 100 milliGRAM(s) Oral every 8 hours  insulin glargine Injectable (LANTUS) 34 Unit(s) SubCutaneous at bedtime  insulin lispro (ADMELOG) corrective regimen sliding scale   SubCutaneous three times a day before meals  insulin lispro (ADMELOG) corrective regimen sliding scale   SubCutaneous at bedtime  insulin lispro Injectable (ADMELOG) 18 Unit(s) SubCutaneous three times a day before meals  levothyroxine 88 MICROGram(s) Oral daily  lisinopril 20 milliGRAM(s) Oral daily  pegfilgrastim-jmdb (FULPHILA) Injectable 6 milliGRAM(s) SubCutaneous once  potassium chloride    Tablet ER 20 milliEquivalent(s) Oral two times a day  predniSONE   Tablet 80 milliGRAM(s) Oral every 24 hours  sodium chloride 0.9%. 1000 milliLiter(s) IV Continuous <Continuous>  thiamine Injectable 100 milliGRAM(s) IV Push daily    PRN MEDICATIONS  acetaminophen     Tablet .. 650 milliGRAM(s) Oral every 6 hours PRN  baclofen 5 milliGRAM(s) Oral every 8 hours PRN  dextrose Oral Gel 15 Gram(s) Oral once PRN  metoclopramide Injectable 10 milliGRAM(s) IV Push every 6 hours PRN  polyethylene glycol 3350 17 Gram(s) Oral two times a day PRN  sodium chloride 0.9% lock flush 10 milliLiter(s) IV Push every 1 hour PRN    Vital Signs Last 24 Hrs      PHYSICAL EXAM  General: adult in NAD  HEENT: clear oropharynx, anicteric sclera  CV: normal S1/S2 RRR  Lungs: positive air movement b/l ant lungs, clear to auscultation, no wheezes, no rales  Abdomen: soft, + BS,  non-tender non-distended,   Ext: no edema  Skin: no rash  Neuro: alert and oriented X 2, no focal deficits  Central Line:  PICC CDI    LABS:                   CAPILLARY BLOOD GLUCOSE  POCT Blood Glucose.: 144 mg/dL (01 Jan 2025 08:51)  POCT Blood Glucose.: 176 mg/dL (01 Jan 2025 03:28)  POCT Blood Glucose.: 208 mg/dL (31 Dec 2024 20:57)  POCT Blood Glucose.: 183 mg/dL (31 Dec 2024 17:58)  POCT Blood Glucose.: 201 mg/dL (31 Dec 2024 15:16)  POCT Blood Glucose.: 265 mg/dL (31 Dec 2024 12:21)    LIVER FUNCTIONS - ( 01 Jan 2025 07:18 )  Alb: 2.5 g/dL / Pro: 5.6 g/dL / ALK PHOS: 97 U/L / ALT: 12 U/L / AST: 15 U/L / GGT: x           Urinalysis Basic - ( 01 Jan 2025 07:18 )  Color: x / Appearance: x / SG: x / pH: x  Gluc: 161 mg/dL / Ketone: x  / Bili: x / Urobili: x   Blood: x / Protein: x / Nitrite: x   Leuk Esterase: x / RBC: x / WBC x   Sq Epi: x / Non Sq Epi: x / Bacteria: x    Cerebrospinal Fluid Cell Count-1 (12.31.24 @ 14:05)    CSF Color: No Color   Tube Type: Tube 4   CSF Appearance: Clear   CSF Neutrophils: See Note: Mononuclear cells only present on review of cytospin.   Total Nucleated Cell Count, CSF: <1 /uL   RBC Count - Spinal Fluid: 1 /uL    -----------------    Cultures: no recent    -----------------    RADIOLOGY & ADDITIONAL STUDIES:  from: Xray Chest 1 View- PORTABLE-Urgent (Xray Chest 1 View- PORTABLE-Urgent .) (12.28.24 @ 14:48)   FINDINGS:  Left-sided PICC line with tip in the SVC.  The heart is normal in size.  No focal consolidation.  There is no pneumothorax or pleural effusion.  No acute osseous abnormalities. Chronic left-sided rib fractures. Chronic   left humeral neck fracture.    IMPRESSION:  No focal consolidation.           Diagnosis: DLBCL gcb stubtype (hx renal transplant 2012)    Protocol/Chemo Regimen: cycle 1- R-miniCHOP (Rituximab as day 1, CHOP started on day 2)    Day: 6     Pt endorsed: (more confused per family); no complaints from patient    Review of Systems: Patient denied nausea, vomiting, mouth pain,  chest pain, cough, dyspnea, abdominal pain, constipation, diarrhea, rectal pain,  rash, fatigue, headache, bleeding    Pain scale: denies                                 Location: NA    Diet:  consistent carbo no snack    Allergies: No Known Allergies    HEME/ONC MEDICATIONS  heparin   Injectable 5000 Unit(s) SubCutaneous every 12 hours    STANDING MEDICATIONS  allopurinol 100 milliGRAM(s) Oral daily  amLODIPine   Tablet 10 milliGRAM(s) Oral daily  buPROPion XL (24-Hour) . 300 milliGRAM(s) Oral daily  carvedilol 12.5 milliGRAM(s) Oral every 12 hours  chlorhexidine 2% Cloths 1 Application(s) Topical <User Schedule>  escitalopram 10 milliGRAM(s) Oral daily  glucagon  Injectable 1 milliGRAM(s) IntraMuscular once  hydrALAZINE 100 milliGRAM(s) Oral every 8 hours  insulin glargine Injectable (LANTUS) 34 Unit(s) SubCutaneous at bedtime  insulin lispro (ADMELOG) corrective regimen sliding scale   SubCutaneous three times a day before meals  insulin lispro (ADMELOG) corrective regimen sliding scale   SubCutaneous at bedtime  insulin lispro Injectable (ADMELOG) 18 Unit(s) SubCutaneous three times a day before meals  levothyroxine 88 MICROGram(s) Oral daily  lisinopril 20 milliGRAM(s) Oral daily  pegfilgrastim-jmdb (FULPHILA) Injectable 6 milliGRAM(s) SubCutaneous once  potassium chloride    Tablet ER 20 milliEquivalent(s) Oral two times a day  predniSONE   Tablet 80 milliGRAM(s) Oral every 24 hours  sodium chloride 0.9%. 1000 milliLiter(s) IV Continuous <Continuous>  thiamine Injectable 100 milliGRAM(s) IV Push daily    PRN MEDICATIONS  acetaminophen     Tablet .. 650 milliGRAM(s) Oral every 6 hours PRN  baclofen 5 milliGRAM(s) Oral every 8 hours PRN  dextrose Oral Gel 15 Gram(s) Oral once PRN  metoclopramide Injectable 10 milliGRAM(s) IV Push every 6 hours PRN  polyethylene glycol 3350 17 Gram(s) Oral two times a day PRN  sodium chloride 0.9% lock flush 10 milliLiter(s) IV Push every 1 hour PRN    Vital Signs Last 24 Hrs  T(C): 36.4 (02 Jan 2025 09:05), Max: 36.6 (01 Jan 2025 14:18)  T(F): 97.6 (02 Jan 2025 09:05), Max: 97.8 (01 Jan 2025 14:18)  HR: 62 (02 Jan 2025 09:05) (57 - 79)  BP: 165/66 (02 Jan 2025 09:05) (104/63 - 177/74)  RR: 18 (02 Jan 2025 09:05) (18 - 18)  SpO2: 97% (02 Jan 2025 09:05) (96% - 98%)    Parameters below as of 02 Jan 2025 09:05  Patient On (Oxygen Delivery Method): room air      PHYSICAL EXAM  General: adult in NAD  HEENT: clear oropharynx, anicteric sclera  CV: normal S1/S2 RRR  Lungs: positive air movement b/l ant lungs, clear to auscultation, no wheezes, no rales  Abdomen: soft, + BS,  non-tender non-distended,   Ext: no edema  Skin: no rash  Neuro: alert and oriented X 2, no focal deficits  Central Line:  PICC CDI    LABS:                             9.2    14.62 )-----------( 201      ( 02 Jan 2025 07:16 )             28.2     Mean Cell Volume : 92.8 fl  Mean Cell Hemoglobin : 30.3 pg  Mean Cell Hemoglobin Concentration : 32.6 g/dL  Auto Neutrophil # : 14.12 K/uL  Auto Lymphocyte # : 0.25 K/uL  Auto Monocyte # : 0.25 K/uL  Auto Eosinophil # : 0.00 K/uL  Auto Basophil # : 0.00 K/uL  Auto Neutrophil % : 93.2 %  Auto Lymphocyte % : 1.7 %  Auto Monocyte % : 1.7 %  Auto Eosinophil % : 0.0 %  Auto Basophil % : 0.0 %      01-02    141  |  108  |  27[H]  ----------------------------<  108[H]  4.8   |  23  |  0.63    Ca    9.2      02 Jan 2025 07:13  Phos  2.9     01-02  Mg     2.0     01-02    TPro  5.3[L]  /  Alb  2.7[L]  /  TBili  0.5  /  DBili  x   /  AST  20  /  ALT  14  /  AlkPhos  98  01-02    PT/INR - ( 01 Jan 2025 07:18 )   PT: 10.2 sec;   INR: 0.89 ratio    PTT - ( 01 Jan 2025 07:18 )  PTT:26.9 sec      Uric Acid 3.2                    CAPILLARY BLOOD GLUCOSE  POCT Blood Glucose.: 144 mg/dL (01 Jan 2025 08:51)  POCT Blood Glucose.: 176 mg/dL (01 Jan 2025 03:28)  POCT Blood Glucose.: 208 mg/dL (31 Dec 2024 20:57)  POCT Blood Glucose.: 183 mg/dL (31 Dec 2024 17:58)  POCT Blood Glucose.: 201 mg/dL (31 Dec 2024 15:16)  POCT Blood Glucose.: 265 mg/dL (31 Dec 2024 12:21)    LIVER FUNCTIONS - ( 01 Jan 2025 07:18 )  Alb: 2.5 g/dL / Pro: 5.6 g/dL / ALK PHOS: 97 U/L / ALT: 12 U/L / AST: 15 U/L / GGT: x           Urinalysis Basic - ( 01 Jan 2025 07:18 )  Color: x / Appearance: x / SG: x / pH: x  Gluc: 161 mg/dL / Ketone: x  / Bili: x / Urobili: x   Blood: x / Protein: x / Nitrite: x   Leuk Esterase: x / RBC: x / WBC x   Sq Epi: x / Non Sq Epi: x / Bacteria: x    Cerebrospinal Fluid Cell Count-1 (12.31.24 @ 14:05)    CSF Color: No Color   Tube Type: Tube 4   CSF Appearance: Clear   CSF Neutrophils: See Note: Mononuclear cells only present on review of cytospin.   Total Nucleated Cell Count, CSF: <1 /uL   RBC Count - Spinal Fluid: 1 /uL    -----------------    Cultures: no recent    -----------------    RADIOLOGY & ADDITIONAL STUDIES:  from: Xray Chest 1 View- PORTABLE-Urgent (Xray Chest 1 View- PORTABLE-Urgent .) (12.28.24 @ 14:48)   FINDINGS:  Left-sided PICC line with tip in the SVC.  The heart is normal in size.  No focal consolidation.  There is no pneumothorax or pleural effusion.  No acute osseous abnormalities. Chronic left-sided rib fractures. Chronic   left humeral neck fracture.    IMPRESSION:  No focal consolidation.

## 2025-01-02 NOTE — PROGRESS NOTE ADULT - NUTRITIONAL ASSESSMENT
This patient has been assessed with a concern for Malnutrition and has been determined to have a diagnosis/diagnoses of Severe protein-calorie malnutrition.    This patient is being managed with:   Diet Consistent Carbohydrate/No Snacks-  Low Sodium  Supplement Feeding Modality:  Oral  Ensure Max Cans or Servings Per Day:  2       Frequency:  Daily  Entered: Dec 29 2024  4:21PM

## 2025-01-03 LAB
-  AMPICILLIN/SULBACTAM: SIGNIFICANT CHANGE UP
-  AMPICILLIN: SIGNIFICANT CHANGE UP
-  AZTREONAM: SIGNIFICANT CHANGE UP
-  CEFAZOLIN: SIGNIFICANT CHANGE UP
-  CEFEPIME: SIGNIFICANT CHANGE UP
-  CEFTRIAXONE: SIGNIFICANT CHANGE UP
-  CEFUROXIME: SIGNIFICANT CHANGE UP
-  CIPROFLOXACIN: SIGNIFICANT CHANGE UP
-  ERTAPENEM: SIGNIFICANT CHANGE UP
-  GENTAMICIN: SIGNIFICANT CHANGE UP
-  IMIPENEM: SIGNIFICANT CHANGE UP
-  LEVOFLOXACIN: SIGNIFICANT CHANGE UP
-  MEROPENEM: SIGNIFICANT CHANGE UP
-  NITROFURANTOIN: SIGNIFICANT CHANGE UP
-  PIPERACILLIN/TAZOBACTAM: SIGNIFICANT CHANGE UP
-  TOBRAMYCIN: SIGNIFICANT CHANGE UP
-  TRIMETHOPRIM/SULFAMETHOXAZOLE: SIGNIFICANT CHANGE UP
CULTURE RESULTS: ABNORMAL
METHOD TYPE: SIGNIFICANT CHANGE UP
ORGANISM # SPEC MICROSCOPIC CNT: ABNORMAL
ORGANISM # SPEC MICROSCOPIC CNT: ABNORMAL
SPECIMEN SOURCE: SIGNIFICANT CHANGE UP

## 2025-01-06 ENCOUNTER — APPOINTMENT (OUTPATIENT)
Dept: HEMATOLOGY ONCOLOGY | Facility: CLINIC | Age: 70
End: 2025-01-06
Payer: MEDICARE

## 2025-01-06 ENCOUNTER — APPOINTMENT (OUTPATIENT)
Dept: HEMATOLOGY ONCOLOGY | Facility: CLINIC | Age: 70
End: 2025-01-06

## 2025-01-06 DIAGNOSIS — D47.Z1 POST-TRANSPLANT LYMPHOPROLIFERATIVE DISORDER (PTLD): ICD-10-CM

## 2025-01-06 PROCEDURE — 99213 OFFICE O/P EST LOW 20 MIN: CPT

## 2025-01-06 PROCEDURE — G2211 COMPLEX E/M VISIT ADD ON: CPT

## 2025-01-08 NOTE — DIETITIAN INITIAL EVALUATION ADULT - PERTINENT LABORATORY DATA
Unable to assess due to medical condition 12-02    137  |  104  |  24[H]  ----------------------------<  289[H]  3.5   |  29  |  1.10    Ca    11.2[H]      02 Dec 2024 11:50  Phos  1.9     12-02  Mg     1.5     12-02    TPro  6.7  /  Alb  2.1[L]  /  TBili  0.4  /  DBili  x   /  AST  56[H]  /  ALT  55  /  AlkPhos  118  12-02  POCT Blood Glucose.: 294 mg/dL (12-02-24 @ 12:00)  A1C with Estimated Average Glucose Result: 7.4 % (11-30-24 @ 06:40)

## 2025-01-13 RX ORDER — SENNOSIDES 8.6 MG/1
1 TABLET, FILM COATED ORAL
Refills: 0 | DISCHARGE

## 2025-01-13 RX ORDER — CARVEDILOL 25 MG/1
1 TABLET, FILM COATED ORAL
Refills: 0 | DISCHARGE

## 2025-01-13 RX ORDER — INSULIN LISPRO 100/ML
0 VIAL (ML) SUBCUTANEOUS
Refills: 0 | DISCHARGE

## 2025-01-13 RX ORDER — ASCORBIC ACID 1000 MG
0 TABLET ORAL
Refills: 0 | DISCHARGE

## 2025-01-13 RX ORDER — FERROUS SULFATE 325(65) MG
0 TABLET ORAL
Refills: 0 | DISCHARGE

## 2025-01-13 RX ORDER — HYDRALAZINE HYDROCHLORIDE 10 MG/1
1 TABLET ORAL
Refills: 0 | DISCHARGE

## 2025-01-15 ENCOUNTER — NON-APPOINTMENT (OUTPATIENT)
Age: 70
End: 2025-01-15

## 2025-01-17 ENCOUNTER — APPOINTMENT (OUTPATIENT)
Dept: HEMATOLOGY ONCOLOGY | Facility: CLINIC | Age: 70
End: 2025-01-17

## 2025-01-17 ENCOUNTER — APPOINTMENT (OUTPATIENT)
Dept: INFUSION THERAPY | Facility: HOSPITAL | Age: 70
End: 2025-01-17

## 2025-01-17 ENCOUNTER — INPATIENT (INPATIENT)
Facility: HOSPITAL | Age: 70
LOS: 19 days | Discharge: NURSING, REHAB OR LONG-TERM CARE | DRG: 846 | End: 2025-02-06
Attending: HOSPITALIST | Admitting: INTERNAL MEDICINE
Payer: MEDICARE

## 2025-01-17 VITALS — HEIGHT: 72 IN | WEIGHT: 166.67 LBS

## 2025-01-17 DIAGNOSIS — B99.9 UNSPECIFIED INFECTIOUS DISEASE: ICD-10-CM

## 2025-01-17 DIAGNOSIS — C83.30 DIFFUSE LARGE B-CELL LYMPHOMA, UNSPECIFIED SITE: ICD-10-CM

## 2025-01-17 DIAGNOSIS — Z98.41 CATARACT EXTRACTION STATUS, RIGHT EYE: Chronic | ICD-10-CM

## 2025-01-17 DIAGNOSIS — Z29.9 ENCOUNTER FOR PROPHYLACTIC MEASURES, UNSPECIFIED: ICD-10-CM

## 2025-01-17 DIAGNOSIS — N13.5 CROSSING VESSEL AND STRICTURE OF URETER WITHOUT HYDRONEPHROSIS: Chronic | ICD-10-CM

## 2025-01-17 DIAGNOSIS — I10 ESSENTIAL (PRIMARY) HYPERTENSION: ICD-10-CM

## 2025-01-17 DIAGNOSIS — I77.0 ARTERIOVENOUS FISTULA, ACQUIRED: Chronic | ICD-10-CM

## 2025-01-17 DIAGNOSIS — D47.Z1 POST-TRANSPLANT LYMPHOPROLIFERATIVE DISORDER (PTLD): ICD-10-CM

## 2025-01-17 DIAGNOSIS — Z98.89 OTHER SPECIFIED POSTPROCEDURAL STATES: Chronic | ICD-10-CM

## 2025-01-17 DIAGNOSIS — E78.5 HYPERLIPIDEMIA, UNSPECIFIED: ICD-10-CM

## 2025-01-17 DIAGNOSIS — Z98.42 CATARACT EXTRACTION STATUS, LEFT EYE: Chronic | ICD-10-CM

## 2025-01-17 DIAGNOSIS — L89.159 PRESSURE ULCER OF SACRAL REGION, UNSPECIFIED STAGE: ICD-10-CM

## 2025-01-17 DIAGNOSIS — E11.9 TYPE 2 DIABETES MELLITUS WITHOUT COMPLICATIONS: ICD-10-CM

## 2025-01-17 DIAGNOSIS — Z94.0 KIDNEY TRANSPLANT STATUS: Chronic | ICD-10-CM

## 2025-01-17 DIAGNOSIS — K21.9 GASTRO-ESOPHAGEAL REFLUX DISEASE WITHOUT ESOPHAGITIS: ICD-10-CM

## 2025-01-17 DIAGNOSIS — Z94.0 KIDNEY TRANSPLANT STATUS: ICD-10-CM

## 2025-01-17 DIAGNOSIS — E03.9 HYPOTHYROIDISM, UNSPECIFIED: ICD-10-CM

## 2025-01-17 DIAGNOSIS — F32.9 MAJOR DEPRESSIVE DISORDER, SINGLE EPISODE, UNSPECIFIED: ICD-10-CM

## 2025-01-17 DIAGNOSIS — Z90.5 ACQUIRED ABSENCE OF KIDNEY: Chronic | ICD-10-CM

## 2025-01-17 DIAGNOSIS — Z90.49 ACQUIRED ABSENCE OF OTHER SPECIFIED PARTS OF DIGESTIVE TRACT: Chronic | ICD-10-CM

## 2025-01-17 LAB
ALBUMIN SERPL ELPH-MCNC: 2.4 G/DL — LOW (ref 3.3–5)
ALP SERPL-CCNC: 89 U/L — SIGNIFICANT CHANGE UP (ref 40–120)
ALT FLD-CCNC: 58 U/L — HIGH (ref 10–45)
ANION GAP SERPL CALC-SCNC: 13 MMOL/L — SIGNIFICANT CHANGE UP (ref 5–17)
ANISOCYTOSIS BLD QL: SLIGHT — SIGNIFICANT CHANGE UP
APPEARANCE UR: CLEAR — SIGNIFICANT CHANGE UP
AST SERPL-CCNC: 56 U/L — HIGH (ref 10–40)
BACTERIA # UR AUTO: NEGATIVE /HPF — SIGNIFICANT CHANGE UP
BASOPHILS # BLD AUTO: 0.06 K/UL — SIGNIFICANT CHANGE UP (ref 0–0.2)
BASOPHILS NFR BLD AUTO: 0.9 % — SIGNIFICANT CHANGE UP (ref 0–2)
BILIRUB SERPL-MCNC: 0.3 MG/DL — SIGNIFICANT CHANGE UP (ref 0.2–1.2)
BILIRUB UR-MCNC: NEGATIVE — SIGNIFICANT CHANGE UP
BLD GP AB SCN SERPL QL: NEGATIVE — SIGNIFICANT CHANGE UP
BUN SERPL-MCNC: 22 MG/DL — SIGNIFICANT CHANGE UP (ref 7–23)
CALCIUM SERPL-MCNC: 8.7 MG/DL — SIGNIFICANT CHANGE UP (ref 8.4–10.5)
CAST: 3 /LPF — SIGNIFICANT CHANGE UP (ref 0–4)
CHLORIDE SERPL-SCNC: 96 MMOL/L — SIGNIFICANT CHANGE UP (ref 96–108)
CO2 SERPL-SCNC: 24 MMOL/L — SIGNIFICANT CHANGE UP (ref 22–31)
COLOR SPEC: YELLOW — SIGNIFICANT CHANGE UP
CREAT SERPL-MCNC: 0.81 MG/DL — SIGNIFICANT CHANGE UP (ref 0.5–1.3)
CYSTATIN C SERPL-MCNC: 1.63 MG/L — HIGH (ref 0.77–1.42)
DACRYOCYTES BLD QL SMEAR: SLIGHT — SIGNIFICANT CHANGE UP
DIFF PNL FLD: NEGATIVE — SIGNIFICANT CHANGE UP
EGFR: 97 ML/MIN/1.73M2 — SIGNIFICANT CHANGE UP
ELLIPTOCYTES BLD QL SMEAR: SLIGHT — SIGNIFICANT CHANGE UP
EOSINOPHIL # BLD AUTO: 0 K/UL — SIGNIFICANT CHANGE UP (ref 0–0.5)
EOSINOPHIL NFR BLD AUTO: 0 % — SIGNIFICANT CHANGE UP (ref 0–6)
FINE GRAN CASTS #/AREA URNS AUTO: PRESENT
FLUAV AG NPH QL: SIGNIFICANT CHANGE UP
FLUBV AG NPH QL: SIGNIFICANT CHANGE UP
GFR/BSA.PRED SERPLBLD CYS-BASED-ARV: 40 ML/MIN/1.73M2 — LOW
GLUCOSE BLDC GLUCOMTR-MCNC: 232 MG/DL — HIGH (ref 70–99)
GLUCOSE BLDC GLUCOMTR-MCNC: 244 MG/DL — HIGH (ref 70–99)
GLUCOSE BLDC GLUCOMTR-MCNC: 326 MG/DL — HIGH (ref 70–99)
GLUCOSE BLDC GLUCOMTR-MCNC: 402 MG/DL — HIGH (ref 70–99)
GLUCOSE BLDC GLUCOMTR-MCNC: 418 MG/DL — HIGH (ref 70–99)
GLUCOSE SERPL-MCNC: 227 MG/DL — HIGH (ref 70–99)
GLUCOSE UR QL: NEGATIVE MG/DL — SIGNIFICANT CHANGE UP
HCOV PNL SPEC NAA+PROBE: DETECTED
HCT VFR BLD CALC: 24.1 % — LOW (ref 39–50)
HGB BLD-MCNC: 7.7 G/DL — LOW (ref 13–17)
HYALINE CASTS # UR AUTO: PRESENT
HYPOCHROMIA BLD QL: SLIGHT — SIGNIFICANT CHANGE UP
HYPOSEGMENTATION: PRESENT — SIGNIFICANT CHANGE UP
INR BLD: 1.18 RATIO — HIGH (ref 0.85–1.16)
KETONES UR-MCNC: NEGATIVE MG/DL — SIGNIFICANT CHANGE UP
LDH SERPL L TO P-CCNC: 326 U/L — HIGH (ref 50–242)
LEUKOCYTE ESTERASE UR-ACNC: ABNORMAL
LYMPHOCYTES # BLD AUTO: 0.06 K/UL — LOW (ref 1–3.3)
LYMPHOCYTES # BLD AUTO: 0.9 % — LOW (ref 13–44)
MAGNESIUM SERPL-MCNC: 1.6 MG/DL — SIGNIFICANT CHANGE UP (ref 1.6–2.6)
MANUAL SMEAR VERIFICATION: SIGNIFICANT CHANGE UP
MCHC RBC-ENTMCNC: 29.4 PG — SIGNIFICANT CHANGE UP (ref 27–34)
MCHC RBC-ENTMCNC: 32 G/DL — SIGNIFICANT CHANGE UP (ref 32–36)
MCV RBC AUTO: 92 FL — SIGNIFICANT CHANGE UP (ref 80–100)
MONOCYTES # BLD AUTO: 0.17 K/UL — SIGNIFICANT CHANGE UP (ref 0–0.9)
MONOCYTES NFR BLD AUTO: 2.6 % — SIGNIFICANT CHANGE UP (ref 2–14)
NEUTROPHILS # BLD AUTO: 6.11 K/UL — SIGNIFICANT CHANGE UP (ref 1.8–7.4)
NEUTROPHILS NFR BLD AUTO: 93.8 % — HIGH (ref 43–77)
NEUTS BAND # BLD: 0.9 % — SIGNIFICANT CHANGE UP (ref 0–8)
NEUTS BAND NFR BLD: 0.9 % — SIGNIFICANT CHANGE UP (ref 0–8)
NITRITE UR-MCNC: NEGATIVE — SIGNIFICANT CHANGE UP
OVALOCYTES BLD QL SMEAR: SLIGHT — SIGNIFICANT CHANGE UP
PH UR: 5.5 — SIGNIFICANT CHANGE UP (ref 5–8)
PHOSPHATE SERPL-MCNC: 3.1 MG/DL — SIGNIFICANT CHANGE UP (ref 2.5–4.5)
PLAT MORPH BLD: ABNORMAL
PLATELET # BLD AUTO: 294 K/UL — SIGNIFICANT CHANGE UP (ref 150–400)
POIKILOCYTOSIS BLD QL AUTO: SLIGHT — SIGNIFICANT CHANGE UP
POLYCHROMASIA BLD QL SMEAR: SLIGHT — SIGNIFICANT CHANGE UP
POTASSIUM SERPL-MCNC: 4.6 MMOL/L — SIGNIFICANT CHANGE UP (ref 3.5–5.3)
POTASSIUM SERPL-SCNC: 4.6 MMOL/L — SIGNIFICANT CHANGE UP (ref 3.5–5.3)
PROMYELOCYTES # FLD: 0.9 % — HIGH (ref 0–0)
PROMYELOCYTES NFR BLD: 0.9 % — HIGH (ref 0–0)
PROT SERPL-MCNC: 5.5 G/DL — LOW (ref 6–8.3)
PROT UR-MCNC: SIGNIFICANT CHANGE UP MG/DL
PROTHROM AB SERPL-ACNC: 13.5 SEC — HIGH (ref 9.9–13.4)
RAPID RVP RESULT: DETECTED
RBC # BLD: 2.62 M/UL — LOW (ref 4.2–5.8)
RBC # FLD: 19.1 % — HIGH (ref 10.3–14.5)
RBC BLD AUTO: ABNORMAL
RBC CASTS # UR COMP ASSIST: 4 /HPF — SIGNIFICANT CHANGE UP (ref 0–4)
REVIEW: SIGNIFICANT CHANGE UP
RH IG SCN BLD-IMP: POSITIVE — SIGNIFICANT CHANGE UP
RSV RNA NPH QL NAA+NON-PROBE: SIGNIFICANT CHANGE UP
SARS-COV-2 RNA SPEC QL NAA+PROBE: SIGNIFICANT CHANGE UP
SARS-COV-2 RNA SPEC QL NAA+PROBE: SIGNIFICANT CHANGE UP
SCHISTOCYTES BLD QL AUTO: SLIGHT — SIGNIFICANT CHANGE UP
SODIUM SERPL-SCNC: 133 MMOL/L — LOW (ref 135–145)
SP GR SPEC: 1.02 — SIGNIFICANT CHANGE UP (ref 1–1.03)
SQUAMOUS # UR AUTO: 4 /HPF — SIGNIFICANT CHANGE UP (ref 0–5)
TARGETS BLD QL SMEAR: SLIGHT — SIGNIFICANT CHANGE UP
URATE SERPL-MCNC: 4.2 MG/DL — SIGNIFICANT CHANGE UP (ref 3.4–8.8)
UROBILINOGEN FLD QL: 1 MG/DL — SIGNIFICANT CHANGE UP (ref 0.2–1)
WBC # BLD: 6.45 K/UL — SIGNIFICANT CHANGE UP (ref 3.8–10.5)
WBC # FLD AUTO: 6.45 K/UL — SIGNIFICANT CHANGE UP (ref 3.8–10.5)
WBC UR QL: 15 /HPF — HIGH (ref 0–5)
YEAST-LIKE CELLS: PRESENT

## 2025-01-17 PROCEDURE — 99222 1ST HOSP IP/OBS MODERATE 55: CPT | Mod: FS

## 2025-01-17 PROCEDURE — 74176 CT ABD & PELVIS W/O CONTRAST: CPT | Mod: 26

## 2025-01-17 PROCEDURE — 71045 X-RAY EXAM CHEST 1 VIEW: CPT | Mod: 26

## 2025-01-17 PROCEDURE — 99223 1ST HOSP IP/OBS HIGH 75: CPT | Mod: FS

## 2025-01-17 PROCEDURE — 71250 CT THORAX DX C-: CPT | Mod: 26

## 2025-01-17 RX ORDER — HYDRALAZINE HCL 100 MG
100 TABLET ORAL EVERY 8 HOURS
Refills: 0 | Status: DISCONTINUED | OUTPATIENT
Start: 2025-01-17 | End: 2025-02-06

## 2025-01-17 RX ORDER — CLONIDINE HYDROCHLORIDE 0.2 MG/1
1 TABLET ORAL
Qty: 0 | Refills: 0 | DISCHARGE

## 2025-01-17 RX ORDER — DM/PSEUDOEPHED/ACETAMINOPHEN 10-30-250
12.5 CAPSULE ORAL ONCE
Refills: 0 | Status: DISCONTINUED | OUTPATIENT
Start: 2025-01-17 | End: 2025-01-18

## 2025-01-17 RX ORDER — POLYETHYLENE GLYCOL 3350 17 G/17G
17 POWDER, FOR SOLUTION ORAL AT BEDTIME
Refills: 0 | Status: DISCONTINUED | OUTPATIENT
Start: 2025-01-17 | End: 2025-01-29

## 2025-01-17 RX ORDER — SODIUM CHLORIDE 9 G/ML
1000 INJECTION, SOLUTION INTRAVENOUS
Refills: 0 | Status: DISCONTINUED | OUTPATIENT
Start: 2025-01-17 | End: 2025-01-18

## 2025-01-17 RX ORDER — PREDNISONE 5 MG
1 TABLET ORAL
Qty: 0 | Refills: 0 | DISCHARGE

## 2025-01-17 RX ORDER — SENNOSIDES 8.6 MG
2 TABLET ORAL AT BEDTIME
Refills: 0 | Status: DISCONTINUED | OUTPATIENT
Start: 2025-01-17 | End: 2025-02-06

## 2025-01-17 RX ORDER — LEVOTHYROXINE SODIUM 175 UG/1
1 TABLET ORAL
Refills: 0 | DISCHARGE

## 2025-01-17 RX ORDER — ACETAMINOPHEN 160 MG/5ML
650 SUSPENSION ORAL ONCE
Refills: 0 | Status: COMPLETED | OUTPATIENT
Start: 2025-01-17 | End: 2025-01-17

## 2025-01-17 RX ORDER — ACETAMINOPHEN 160 MG/5ML
1000 SUSPENSION ORAL ONCE
Refills: 0 | Status: COMPLETED | OUTPATIENT
Start: 2025-01-17 | End: 2025-01-17

## 2025-01-17 RX ORDER — POLYETHYLENE GLYCOL 3350 17 G/17G
17 POWDER, FOR SOLUTION ORAL DAILY
Refills: 0 | Status: DISCONTINUED | OUTPATIENT
Start: 2025-01-17 | End: 2025-01-29

## 2025-01-17 RX ORDER — CARVEDILOL 6.25 MG
12.5 TABLET ORAL EVERY 12 HOURS
Refills: 0 | Status: DISCONTINUED | OUTPATIENT
Start: 2025-01-17 | End: 2025-02-06

## 2025-01-17 RX ORDER — PIPERACILLIN SODIUM AND TAZOBACTAM SODIUM 2; 250 G/50ML; MG/50ML
3.38 INJECTION, POWDER, FOR SOLUTION INTRAVENOUS ONCE
Refills: 0 | Status: COMPLETED | OUTPATIENT
Start: 2025-01-17 | End: 2025-01-17

## 2025-01-17 RX ORDER — TACROLIMUS 1 MG/1
1 CAPSULE, GELATIN COATED ORAL
Refills: 0 | Status: DISCONTINUED | OUTPATIENT
Start: 2025-01-18 | End: 2025-01-26

## 2025-01-17 RX ORDER — POLYETHYLENE GLYCOL 3350 17 G/DOSE
17 POWDER (GRAM) ORAL
Qty: 0 | Refills: 0 | DISCHARGE

## 2025-01-17 RX ORDER — ACETAMINOPHEN 160 MG/5ML
650 SUSPENSION ORAL EVERY 6 HOURS
Refills: 0 | Status: DISCONTINUED | OUTPATIENT
Start: 2025-01-17 | End: 2025-02-06

## 2025-01-17 RX ORDER — INSULIN GLARGINE-YFGN 100 [IU]/ML
38 INJECTION, SOLUTION SUBCUTANEOUS AT BEDTIME
Refills: 0 | Status: DISCONTINUED | OUTPATIENT
Start: 2025-01-17 | End: 2025-01-18

## 2025-01-17 RX ORDER — PREDNISONE 5 MG/1
20 TABLET ORAL DAILY
Refills: 0 | Status: DISCONTINUED | OUTPATIENT
Start: 2025-01-18 | End: 2025-01-18

## 2025-01-17 RX ORDER — ANTISEPTIC SURGICAL SCRUB 0.04 MG/ML
1 SOLUTION TOPICAL
Refills: 0 | Status: DISCONTINUED | OUTPATIENT
Start: 2025-01-17 | End: 2025-02-06

## 2025-01-17 RX ORDER — ROSUVASTATIN CALCIUM 10 MG/1
10 TABLET, FILM COATED ORAL AT BEDTIME
Refills: 0 | Status: DISCONTINUED | OUTPATIENT
Start: 2025-01-17 | End: 2025-02-06

## 2025-01-17 RX ORDER — DM/PSEUDOEPHED/ACETAMINOPHEN 10-30-250
25 CAPSULE ORAL ONCE
Refills: 0 | Status: DISCONTINUED | OUTPATIENT
Start: 2025-01-17 | End: 2025-01-18

## 2025-01-17 RX ORDER — INSULIN LISPRO 100/ML
VIAL (ML) SUBCUTANEOUS AT BEDTIME
Refills: 0 | Status: DISCONTINUED | OUTPATIENT
Start: 2025-01-17 | End: 2025-01-18

## 2025-01-17 RX ORDER — ALTEPLASE 100 MG
2 KIT INTRAVENOUS ONCE
Refills: 0 | Status: DISCONTINUED | OUTPATIENT
Start: 2025-01-17 | End: 2025-01-25

## 2025-01-17 RX ORDER — BACTERIOSTATIC SODIUM CHLORIDE 0.9 %
10 VIAL (ML) INJECTION
Refills: 0 | Status: DISCONTINUED | OUTPATIENT
Start: 2025-01-17 | End: 2025-02-06

## 2025-01-17 RX ORDER — PIPERACILLIN SODIUM AND TAZOBACTAM SODIUM 2; 250 G/50ML; MG/50ML
3.38 INJECTION, POWDER, FOR SOLUTION INTRAVENOUS EVERY 8 HOURS
Refills: 0 | Status: DISCONTINUED | OUTPATIENT
Start: 2025-01-18 | End: 2025-01-20

## 2025-01-17 RX ORDER — INSULIN GLARGINE-YFGN 100 [IU]/ML
38 INJECTION, SOLUTION SUBCUTANEOUS
Refills: 0 | DISCHARGE

## 2025-01-17 RX ORDER — TACROLIMUS 1 MG/1
1 CAPSULE, GELATIN COATED ORAL AT BEDTIME
Refills: 0 | Status: DISCONTINUED | OUTPATIENT
Start: 2025-01-17 | End: 2025-01-26

## 2025-01-17 RX ORDER — AMLODIPINE BESYLATE 5 MG
10 TABLET ORAL DAILY
Refills: 0 | Status: DISCONTINUED | OUTPATIENT
Start: 2025-01-18 | End: 2025-02-06

## 2025-01-17 RX ORDER — PYRIDOXINE HCL (VITAMIN B6) 100 MG
0 TABLET ORAL
Refills: 0 | DISCHARGE

## 2025-01-17 RX ORDER — BUPROPION HYDROCHLORIDE 150 MG/1
300 TABLET, EXTENDED RELEASE ORAL DAILY
Refills: 0 | Status: DISCONTINUED | OUTPATIENT
Start: 2025-01-18 | End: 2025-02-06

## 2025-01-17 RX ORDER — AZATHIOPRINE 50 MG/1
1 TABLET ORAL
Refills: 0 | DISCHARGE

## 2025-01-17 RX ORDER — ESCITALOPRAM OXALATE 10 MG/1
1 TABLET ORAL
Refills: 0 | DISCHARGE

## 2025-01-17 RX ORDER — INSULIN LISPRO 100/ML
VIAL (ML) SUBCUTANEOUS
Refills: 0 | Status: DISCONTINUED | OUTPATIENT
Start: 2025-01-17 | End: 2025-01-18

## 2025-01-17 RX ORDER — ESCITALOPRAM 10 MG/1
10 TABLET, FILM COATED ORAL DAILY
Refills: 0 | Status: DISCONTINUED | OUTPATIENT
Start: 2025-01-18 | End: 2025-02-06

## 2025-01-17 RX ORDER — BUPROPION HYDROCHLORIDE 150 MG/1
1 TABLET, EXTENDED RELEASE ORAL
Refills: 0 | DISCHARGE

## 2025-01-17 RX ORDER — CLONIDINE HYDROCHLORIDE 0.2 MG/1
0.1 TABLET ORAL THREE TIMES A DAY
Refills: 0 | Status: DISCONTINUED | OUTPATIENT
Start: 2025-01-17 | End: 2025-02-06

## 2025-01-17 RX ORDER — LEVOTHYROXINE SODIUM 25 UG/1
88 TABLET ORAL DAILY
Refills: 0 | Status: DISCONTINUED | OUTPATIENT
Start: 2025-01-17 | End: 2025-02-06

## 2025-01-17 RX ORDER — PANTOPRAZOLE 20 MG/1
40 TABLET, DELAYED RELEASE ORAL DAILY
Refills: 0 | Status: DISCONTINUED | OUTPATIENT
Start: 2025-01-17 | End: 2025-01-29

## 2025-01-17 RX ORDER — LISINOPRIL 30 MG/1
1 TABLET ORAL
Refills: 0 | DISCHARGE

## 2025-01-17 RX ORDER — ENOXAPARIN SODIUM 100 MG/ML
40 INJECTION SUBCUTANEOUS
Refills: 0 | Status: DISCONTINUED | OUTPATIENT
Start: 2025-01-17 | End: 2025-02-06

## 2025-01-17 RX ORDER — DM/PSEUDOEPHED/ACETAMINOPHEN 10-30-250
15 CAPSULE ORAL ONCE
Refills: 0 | Status: DISCONTINUED | OUTPATIENT
Start: 2025-01-17 | End: 2025-01-18

## 2025-01-17 RX ORDER — PIPERACILLIN SODIUM AND TAZOBACTAM SODIUM 2; 250 G/50ML; MG/50ML
3.38 INJECTION, POWDER, FOR SOLUTION INTRAVENOUS ONCE
Refills: 0 | Status: COMPLETED | OUTPATIENT
Start: 2025-01-18 | End: 2025-01-18

## 2025-01-17 RX ORDER — GLUCAGON 3 MG/1
1 POWDER NASAL ONCE
Refills: 0 | Status: DISCONTINUED | OUTPATIENT
Start: 2025-01-17 | End: 2025-01-26

## 2025-01-17 RX ADMIN — Medication 100 MILLIGRAM(S): at 22:50

## 2025-01-17 RX ADMIN — ACETAMINOPHEN 650 MILLIGRAM(S): 160 SUSPENSION ORAL at 14:50

## 2025-01-17 RX ADMIN — ACETAMINOPHEN 1000 MILLIGRAM(S): 160 SUSPENSION ORAL at 22:00

## 2025-01-17 RX ADMIN — ACETAMINOPHEN 400 MILLIGRAM(S): 160 SUSPENSION ORAL at 21:57

## 2025-01-17 RX ADMIN — TACROLIMUS 1 MILLIGRAM(S): 1 CAPSULE, GELATIN COATED ORAL at 22:57

## 2025-01-17 RX ADMIN — Medication 4: at 18:05

## 2025-01-17 RX ADMIN — ROSUVASTATIN CALCIUM 10 MILLIGRAM(S): 10 TABLET, FILM COATED ORAL at 22:49

## 2025-01-17 RX ADMIN — ENOXAPARIN SODIUM 40 MILLIGRAM(S): 100 INJECTION SUBCUTANEOUS at 22:54

## 2025-01-17 RX ADMIN — Medication 2 TABLET(S): at 22:49

## 2025-01-17 RX ADMIN — Medication 12.5 MILLIGRAM(S): at 17:49

## 2025-01-17 RX ADMIN — Medication 4: at 21:43

## 2025-01-17 RX ADMIN — ANTISEPTIC SURGICAL SCRUB 1 APPLICATION(S): 0.04 SOLUTION TOPICAL at 17:50

## 2025-01-17 RX ADMIN — INSULIN GLARGINE-YFGN 38 UNIT(S): 100 INJECTION, SOLUTION SUBCUTANEOUS at 21:42

## 2025-01-17 RX ADMIN — ACETAMINOPHEN 650 MILLIGRAM(S): 160 SUSPENSION ORAL at 11:56

## 2025-01-17 RX ADMIN — CLONIDINE HYDROCHLORIDE 0.1 MILLIGRAM(S): 0.2 TABLET ORAL at 22:50

## 2025-01-17 NOTE — H&P ADULT - NSHPLABSRESULTS_GEN_ALL_CORE
LABS:                        7.7    6.45  )-----------( 294      ( 17 Jan 2025 12:55 )             24.1         Mean Cell Volume : 92.0 fl  Mean Cell Hemoglobin : 29.4 pg  Mean Cell Hemoglobin Concentration : 32.0 g/dL  Auto Neutrophil # : 6.11 K/uL  Auto Lymphocyte # : 0.06 K/uL  Auto Monocyte # : 0.17 K/uL  Auto Eosinophil # : 0.00 K/uL  Auto Basophil # : 0.06 K/uL  Auto Neutrophil % : 93.8 %  Auto Lymphocyte % : 0.9 %  Auto Monocyte % : 2.6 %  Auto Eosinophil % : 0.0 %  Auto Basophil % : 0.9 %      01-17    133[L]  |  96  |  22  ----------------------------<  227[H]  4.6   |  24  |  0.81    Ca    8.7      17 Jan 2025 12:55  Phos  3.1     01-17  Mg     1.6     01-17    TPro  5.5[L]  /  Alb  2.4[L]  /  TBili  0.3  /  DBili  x   /  AST  56[H]  /  ALT  58[H]  /  AlkPhos  89  01-17      Mg 1.6  Phos 3.1      PT/INR - ( 17 Jan 2025 12:55 )   PT: 13.5 sec;   INR: 1.18 ratio           Uric Acid 4.2

## 2025-01-17 NOTE — CHART NOTE - NSCHARTNOTEFT_GEN_A_CORE
Noted on CT C/A/P Notified by RN pt w/ finger stick of 418. Pt currently w/o symptoms or complaints. VSS. Pt given 38U lantus and 4U admelog based on the bedtime insulin sliding scale. BMP, beta-hydroxybutyrate and VBG w/ lytes ordered to r/o DKA; will f/u results. Will obtain 2 AM finger stick to make sure it is downtrending s/p insulin. Will continue to monitor and reassess overnight. Will endorse to day team in AM, attending to follow.     ICU Vital Signs Last 24 Hrs  T(C): 36.8 (18 Jan 2025 00:39), Max: 39.8 (17 Jan 2025 21:32)  T(F): 98.2 (18 Jan 2025 00:39), Max: 103.6 (17 Jan 2025 21:32)  HR: 70 (18 Jan 2025 00:39) (67 - 81)  BP: 119/63 (18 Jan 2025 00:39) (116/61 - 150/64)  BP(mean): --  ABP: --  ABP(mean): --  RR: 20 (18 Jan 2025 00:39) (16 - 20)  SpO2: 96% (18 Jan 2025 00:39) (88% - 97%)    O2 Parameters below as of 18 Jan 2025 00:39  Patient On (Oxygen Delivery Method): nasal cannula  O2 Flow (L/min): Lakesha Rene PA-C  Department of Medicine       ADDENDUM: BMP, beta-hydroxybutyrate and VBG w/ lytes stable. 2 AM finger stick 328. Additional 4U Admelog given for additional coverage. Would consider consulting endocrinology for further recommendations/glucose control. Will continue to monitor.

## 2025-01-17 NOTE — H&P ADULT - NS ATTEND AMEND GEN_ALL_CORE FT
.    Primary: [place]    Vital Signs Last 24 Hrs  T(C): 36.6 (17 Jan 2025 14:30), Max: 38.1 (17 Jan 2025 10:17)  T(F): 97.8 (17 Jan 2025 14:30), Max: 100.6 (17 Jan 2025 10:17)  HR: 81 (17 Jan 2025 14:30) (80 - 81)  BP: 116/61 (17 Jan 2025 14:30) (116/61 - 137/52)  BP(mean): --  RR: 18 (17 Jan 2025 14:30) (18 - 18)  SpO2: 94% (17 Jan 2025 14:30) (88% - 94%)    Parameters below as of 17 Jan 2025 14:30  Patient On (Oxygen Delivery Method): nasal cannula  O2 Flow (L/min): 2    MEDICATIONS  (STANDING):  carvedilol 12.5 milliGRAM(s) Oral every 12 hours  cloNIDine 0.1 milliGRAM(s) Oral three times a day  dextrose 5%. 1000 milliLiter(s) (50 mL/Hr) IV Continuous <Continuous>  dextrose 5%. 1000 milliLiter(s) (100 mL/Hr) IV Continuous <Continuous>  dextrose 50% Injectable 25 Gram(s) IV Push once  dextrose 50% Injectable 12.5 Gram(s) IV Push once  dextrose 50% Injectable 25 Gram(s) IV Push once  glucagon  Injectable 1 milliGRAM(s) IntraMuscular once  hydrALAZINE 100 milliGRAM(s) Oral every 8 hours  insulin glargine Injectable (LANTUS) 38 Unit(s) SubCutaneous at bedtime  insulin lispro (ADMELOG) corrective regimen sliding scale   SubCutaneous three times a day before meals  insulin lispro (ADMELOG) corrective regimen sliding scale   SubCutaneous at bedtime  levothyroxine 88 MICROGram(s) Oral daily  rosuvastatin 10 milliGRAM(s) Oral at bedtime  tacrolimus 1 milliGRAM(s) Oral at bedtime    Rehab medication list:    amlodipine 10 qd  vit C bid  azathioprine 50mg bid  bupropion XL 300mg daily  carvedilol 12.5mg q12  clonidine 0.1mg tid  escitalopram 10 qd  hydralazine 100mg tid  insulin  levothyroxine 88 micrograms qd  lisinopril 20mg daily  pantoprazole 40 bid  prednisone 20 qd  pyridoxime 50mg qd  rosuvastatin 10mg qhs  tacro 2mg AM and 1mg qhs    Assessment: Piero returns for a scheduled admission from [facility] in [place].  Today was to be day 1 cycle 1 mini [facility](O)P for PTLD.  However, course complicated by severe hyperglycemia 48hours ago, febrile upon presentation with drenching sweats.    PMHx::  1) renal transplant 2012: H/O left nephrectomy; renal transplant was secondary to DM  2) AODM  3) HLD  4) hypothyroidism  5) peripheral neuropathy  6) retroperitoneal fibrosis  7) GERD  8) HTN  9) anxiety/depression  10: obstructive sleep apnea  11) last hospitalization: osteomyelitis and E coli urosepsis (12/1/24 - 12/18/24)  [address] [place] [address] (12/18/24): for osteomyelitis & E coli urosepsis,    wife (Ludmila), daughter (Luz Marina) and son (Jose J).    Plan:  Admit to leukemia service  Hold planned chemotherapy. given toxic presentation    ID: concerned for return of osteo    Radiographs: please obtain dry chest/abd pelvis    Renal: continue /pred     Over 70 minutes were spent in the admissions process.

## 2025-01-17 NOTE — ADVANCED PRACTICE NURSE CONSULT - RECOMMEDATIONS
Impression   fecal incontinence  bilateral sacrum/ buttock deep tissue injury with evolution, present on admission.  right ischium deep tissue injury, present on admission.  left ischium deep tissue injury, present on admission.      Recommendations   1. Bilateral sacrum/buttock deep tissue injury       bilateral ischium deep tissue injury  Topical therapy- sacral/bilateral buttocks- cleanse w/incontinent cleanser, pat dry & apply Triad  twice daily & prn soiling . Monitor for changes .  2. Right and left heel -Elevate heels; apply Complete Cair air fluidized boots; ensure that the soles of the feet are not resting on the foot board of the bed.  3  Incontinent management - incontinent cleanser, pads,  julio care  BID  4. Maintain on an alternating air with low air loss surface   5. Turn & reposition every 2 hr; Use positioning pillow to turn and reposition, soft pillow between bony prominences; continue measures to decrease friction/shear/pressure.  6. Nutrition optimization.  7.  Place waffle cushion when out of bed to chair .   will not follow , please recall for new issues.  plan of care reviewed with covering staff Hilaria Simental (NURA)

## 2025-01-17 NOTE — H&P ADULT - ASSESSMENT
68 y/o M PMHx renal transplant 2012 (2/2 DM, s/p left nephrectomy), peripheral neuropathy, hypothyroidism, retroperitoneal fibrosis, HTN, HLD, GERD, anxiety/depression, ANGELIKA and recent admission at [facility] for sacral osteomyelitis and ESBL.coli urosepsis discharged on 12/18/24 to rehab. While admitted to [facility] was noted to have hypercalcemia and liver masses which were biopsied on 12/16/24, biopsy revealed DLBCL. Patient received R mini CHOP on 12/28 now admitted for cycle #2 Rmini CHOP, upon admission patient is febrile will hold off treatment today.  68 y/o M PMHx renal transplant 2012 (2/2 DM, s/p left nephrectomy), peripheral neuropathy, hypothyroidism, retroperitoneal fibrosis, HTN, HLD, GERD, anxiety/depression, ANGELIKA and recent admission at [facility] for sacral osteomyelitis and ESBL.coli urosepsis discharged on 12/18/24 to rehab. While admitted to [facility] was noted to have hypercalcemia and liver masses which were biopsied on 12/16/24, biopsy revealed DLBCL. Patient received R mini CHOP on 12/28 now admitted for cycle #2 Rmini CHOP, upon admission patient is febrile will hold  chemotherapy.

## 2025-01-17 NOTE — ADVANCED PRACTICE NURSE CONSULT - ASSESSMENT
arrived on unit, patient was found lying in a low air loss pressure redistribution support surface style bed. The patient  is unable to turn independently and staff assistance x 2 was provided. Once turned, incontinence of stool was apparent and julio care was provided and, able to view his skin.  bilateral sacrum/buttocks non-blanchable deep red, discoloration and hyperpigmentation with an area  partial - thickness  and tissue loss  consistent with a deep tissue with evolution  size approximately  10 cm x 8 cm x 0.1 cm,  present  on admission.  right ischium with an area of non blanchable hyperpigmentation size approximately 5 cm x 5 cm x 0 cm, consistent with deep tissue injury, present on admission.  left ischium with an area of non blanchable hyperpigmentation size approximately 5 cm x 5 cm x 0 cm, consistent with deep tissue injury, present on admission.  patient  was educated  on the importance  for turning  and positioning  every 2 hours. The use of waffle cushion when out of bed  to chair,  and to shift his Weight every 2 hours while in chair. The importance a keeping his skin clean and dry and  to offload feet/heels , and optimal nutrition.

## 2025-01-17 NOTE — H&P ADULT - HISTORY OF PRESENT ILLNESS
M 70 y/o M PMHx renal transplant 2012 (2/2 DM, s/p left nephrectomy), peripheral neuropathy, hypothyroidism, retroperitoneal fibrosis, HTN, HLD, GERD, anxiety/depression, ANGELIKA and recent admission at [facility] for sacral osteomyelitis and ESBL.coli urosepsis discharged on 12/18/24 to rehab. While admitted to [facility] was noted to have hypercalcemia and liver masses which were biopsied on 12/16/24, biopsy revealed DLBCL. Patient received R mini CHOP on 12/28 now admitted for cycle #2 Rmini CHOP, upon admission patient is febrile will hold off treatment today.  M 68 y/o M PMHx renal transplant 2012 (2/2 DM, s/p left nephrectomy), peripheral neuropathy, hypothyroidism, retroperitoneal fibrosis, HTN, HLD, GERD, anxiety/depression, ANGELIKA and recent admission at [facility] for sacral osteomyelitis and ESBL.coli urosepsis discharged on 12/18/24 to rehab. While admitted to [facility] was noted to have hypercalcemia and liver masses which were biopsied on 12/16/24, biopsy revealed DLBCL. Patient received R mini CHOP on 12/28 now admitted for cycle #2 Rmini CHOP, upon admission patient is febrile will hold chemotherapy today.

## 2025-01-17 NOTE — PATIENT PROFILE ADULT - FALL HARM RISK - HARM RISK INTERVENTIONS
Assistance with ambulation/Assistance OOB with selected safe patient handling equipment/Communicate Risk of Fall with Harm to all staff/Discuss with provider need for PT consult/Monitor gait and stability/Reinforce activity limits and safety measures with patient and family/Tailored Fall Risk Interventions/Visual Cue: Yellow wristband and red socks/Bed in lowest position, wheels locked, appropriate side rails in place/Call bell, personal items and telephone in reach/Instruct patient to call for assistance before getting out of bed or chair/Non-slip footwear when patient is out of bed/[place] to call system/Physically safe environment - no spills, clutter or unnecessary equipment/Purposeful Proactive Rounding/Room/bathroom lighting operational, light cord in reach

## 2025-01-17 NOTE — H&P ADULT - PROBLEM SELECTOR PLAN 2
Not neutropenic.  1/17 - F/u BCX  1/17 - F/U CT C/A/P-   1/17- F/u Flu/COVID PCR   last hospitalization: osteomyelitis and E coli urosepsis (12/1/24 - 12/18/24)   [facility] (12/18/24): for osteomyelitis & E coli urosepsis,

## 2025-01-17 NOTE — H&P ADULT - PROBLEM SELECTOR PROBLEM 4
Elma Davis received Prolia 60 mg subcutaneous injection in the left arm and was administered uneventfully.       Patient was reminded to have calcium level drawn in 1 week.    Lab order placed     Hypothyroidism

## 2025-01-17 NOTE — H&P ADULT - PROBLEM SELECTOR PLAN 1
Admit to [facility], planned for R mini CHOP, found to be febrile hold off chemo today.  Monitor CBC with diff, transfuse as needed.  Monitor electrolytes, replete as needed.  Daily weights.  Strict I/O.  1/17 - Admitted with PICC line, CXR confirmation.

## 2025-01-18 LAB
ALBUMIN SERPL ELPH-MCNC: 2.5 G/DL — LOW (ref 3.3–5)
ALP SERPL-CCNC: 98 U/L — SIGNIFICANT CHANGE UP (ref 40–120)
ALT FLD-CCNC: 58 U/L — HIGH (ref 10–45)
ANION GAP SERPL CALC-SCNC: 11 MMOL/L — SIGNIFICANT CHANGE UP (ref 5–17)
ANION GAP SERPL CALC-SCNC: 12 MMOL/L — SIGNIFICANT CHANGE UP (ref 5–17)
AST SERPL-CCNC: 50 U/L — HIGH (ref 10–40)
B-OH-BUTYR SERPL-SCNC: 0.1 MMOL/L — SIGNIFICANT CHANGE UP
BASOPHILS # BLD AUTO: 0.02 K/UL — SIGNIFICANT CHANGE UP (ref 0–0.2)
BASOPHILS NFR BLD AUTO: 0.3 % — SIGNIFICANT CHANGE UP (ref 0–2)
BILIRUB SERPL-MCNC: 0.4 MG/DL — SIGNIFICANT CHANGE UP (ref 0.2–1.2)
BUN SERPL-MCNC: 29 MG/DL — HIGH (ref 7–23)
BUN SERPL-MCNC: 32 MG/DL — HIGH (ref 7–23)
CALCIUM SERPL-MCNC: 8.6 MG/DL — SIGNIFICANT CHANGE UP (ref 8.4–10.5)
CALCIUM SERPL-MCNC: 8.7 MG/DL — SIGNIFICANT CHANGE UP (ref 8.4–10.5)
CHLORIDE SERPL-SCNC: 96 MMOL/L — SIGNIFICANT CHANGE UP (ref 96–108)
CHLORIDE SERPL-SCNC: 96 MMOL/L — SIGNIFICANT CHANGE UP (ref 96–108)
CO2 SERPL-SCNC: 22 MMOL/L — SIGNIFICANT CHANGE UP (ref 22–31)
CO2 SERPL-SCNC: 24 MMOL/L — SIGNIFICANT CHANGE UP (ref 22–31)
CREAT SERPL-MCNC: 0.93 MG/DL — SIGNIFICANT CHANGE UP (ref 0.5–1.3)
CREAT SERPL-MCNC: 1.01 MG/DL — SIGNIFICANT CHANGE UP (ref 0.5–1.3)
CULTURE RESULTS: SIGNIFICANT CHANGE UP
EGFR: 82 ML/MIN/1.73M2 — SIGNIFICANT CHANGE UP
EGFR: 90 ML/MIN/1.73M2 — SIGNIFICANT CHANGE UP
EOSINOPHIL # BLD AUTO: 0.04 K/UL — SIGNIFICANT CHANGE UP (ref 0–0.5)
EOSINOPHIL NFR BLD AUTO: 0.5 % — SIGNIFICANT CHANGE UP (ref 0–6)
GAS PNL BLDV: SIGNIFICANT CHANGE UP
GLUCOSE BLDC GLUCOMTR-MCNC: 162 MG/DL — HIGH (ref 70–99)
GLUCOSE BLDC GLUCOMTR-MCNC: 260 MG/DL — HIGH (ref 70–99)
GLUCOSE BLDC GLUCOMTR-MCNC: 309 MG/DL — HIGH (ref 70–99)
GLUCOSE BLDC GLUCOMTR-MCNC: 328 MG/DL — HIGH (ref 70–99)
GLUCOSE BLDC GLUCOMTR-MCNC: 370 MG/DL — HIGH (ref 70–99)
GLUCOSE SERPL-MCNC: 233 MG/DL — HIGH (ref 70–99)
GLUCOSE SERPL-MCNC: 316 MG/DL — HIGH (ref 70–99)
HCT VFR BLD CALC: 25.2 % — LOW (ref 39–50)
HGB BLD-MCNC: 8.2 G/DL — LOW (ref 13–17)
IMM GRANULOCYTES NFR BLD AUTO: 2 % — HIGH (ref 0–0.9)
LYMPHOCYTES # BLD AUTO: 0.1 K/UL — LOW (ref 1–3.3)
LYMPHOCYTES # BLD AUTO: 1.3 % — LOW (ref 13–44)
MAGNESIUM SERPL-MCNC: 1.7 MG/DL — SIGNIFICANT CHANGE UP (ref 1.6–2.6)
MCHC RBC-ENTMCNC: 29.9 PG — SIGNIFICANT CHANGE UP (ref 27–34)
MCHC RBC-ENTMCNC: 32.5 G/DL — SIGNIFICANT CHANGE UP (ref 32–36)
MCV RBC AUTO: 92 FL — SIGNIFICANT CHANGE UP (ref 80–100)
MONOCYTES # BLD AUTO: 0.27 K/UL — SIGNIFICANT CHANGE UP (ref 0–0.9)
MONOCYTES NFR BLD AUTO: 3.5 % — SIGNIFICANT CHANGE UP (ref 2–14)
NEUTROPHILS # BLD AUTO: 7.1 K/UL — SIGNIFICANT CHANGE UP (ref 1.8–7.4)
NEUTROPHILS NFR BLD AUTO: 92.4 % — HIGH (ref 43–77)
NRBC # BLD: 0 /100 WBCS — SIGNIFICANT CHANGE UP (ref 0–0)
NRBC BLD-RTO: 0 /100 WBCS — SIGNIFICANT CHANGE UP (ref 0–0)
NT-PROBNP SERPL-SCNC: 728 PG/ML — HIGH (ref 0–300)
PHOSPHATE SERPL-MCNC: 2.5 MG/DL — SIGNIFICANT CHANGE UP (ref 2.5–4.5)
PLATELET # BLD AUTO: 360 K/UL — SIGNIFICANT CHANGE UP (ref 150–400)
POTASSIUM SERPL-MCNC: 4.5 MMOL/L — SIGNIFICANT CHANGE UP (ref 3.5–5.3)
POTASSIUM SERPL-MCNC: 4.8 MMOL/L — SIGNIFICANT CHANGE UP (ref 3.5–5.3)
POTASSIUM SERPL-SCNC: 4.5 MMOL/L — SIGNIFICANT CHANGE UP (ref 3.5–5.3)
POTASSIUM SERPL-SCNC: 4.8 MMOL/L — SIGNIFICANT CHANGE UP (ref 3.5–5.3)
PROT SERPL-MCNC: 5.9 G/DL — LOW (ref 6–8.3)
RBC # BLD: 2.74 M/UL — LOW (ref 4.2–5.8)
RBC # FLD: 19 % — HIGH (ref 10.3–14.5)
SODIUM SERPL-SCNC: 130 MMOL/L — LOW (ref 135–145)
SODIUM SERPL-SCNC: 131 MMOL/L — LOW (ref 135–145)
SPECIMEN SOURCE: SIGNIFICANT CHANGE UP
WBC # BLD: 7.68 K/UL — SIGNIFICANT CHANGE UP (ref 3.8–10.5)
WBC # FLD AUTO: 7.68 K/UL — SIGNIFICANT CHANGE UP (ref 3.8–10.5)

## 2025-01-18 PROCEDURE — 99233 SBSQ HOSP IP/OBS HIGH 50: CPT | Mod: FS

## 2025-01-18 RX ORDER — INSULIN LISPRO 100/ML
8 VIAL (ML) SUBCUTANEOUS
Refills: 0 | Status: DISCONTINUED | OUTPATIENT
Start: 2025-01-18 | End: 2025-01-25

## 2025-01-18 RX ORDER — DM/PSEUDOEPHED/ACETAMINOPHEN 10-30-250
25 CAPSULE ORAL ONCE
Refills: 0 | Status: DISCONTINUED | OUTPATIENT
Start: 2025-01-18 | End: 2025-01-26

## 2025-01-18 RX ORDER — GLUCAGON 3 MG/1
1 POWDER NASAL ONCE
Refills: 0 | Status: DISCONTINUED | OUTPATIENT
Start: 2025-01-18 | End: 2025-01-26

## 2025-01-18 RX ORDER — DM/PSEUDOEPHED/ACETAMINOPHEN 10-30-250
15 CAPSULE ORAL ONCE
Refills: 0 | Status: DISCONTINUED | OUTPATIENT
Start: 2025-01-18 | End: 2025-01-26

## 2025-01-18 RX ORDER — PREDNISONE 5 MG/1
5 TABLET ORAL
Refills: 0 | Status: DISCONTINUED | OUTPATIENT
Start: 2025-01-18 | End: 2025-01-22

## 2025-01-18 RX ORDER — LACTULOSE 10 G/15 ML
15 SOLUTION, ORAL ORAL
Refills: 0 | Status: DISCONTINUED | OUTPATIENT
Start: 2025-01-18 | End: 2025-01-29

## 2025-01-18 RX ORDER — INSULIN LISPRO 100/ML
VIAL (ML) SUBCUTANEOUS AT BEDTIME
Refills: 0 | Status: DISCONTINUED | OUTPATIENT
Start: 2025-01-18 | End: 2025-01-26

## 2025-01-18 RX ORDER — INSULIN LISPRO 100/ML
VIAL (ML) SUBCUTANEOUS
Refills: 0 | Status: DISCONTINUED | OUTPATIENT
Start: 2025-01-18 | End: 2025-01-26

## 2025-01-18 RX ORDER — INSULIN GLARGINE-YFGN 100 [IU]/ML
38 INJECTION, SOLUTION SUBCUTANEOUS AT BEDTIME
Refills: 0 | Status: DISCONTINUED | OUTPATIENT
Start: 2025-01-18 | End: 2025-01-24

## 2025-01-18 RX ORDER — SODIUM CHLORIDE 9 G/ML
1000 INJECTION, SOLUTION INTRAVENOUS
Refills: 0 | Status: DISCONTINUED | OUTPATIENT
Start: 2025-01-18 | End: 2025-01-26

## 2025-01-18 RX ORDER — INSULIN LISPRO 100/ML
4 VIAL (ML) SUBCUTANEOUS ONCE
Refills: 0 | Status: COMPLETED | OUTPATIENT
Start: 2025-01-18 | End: 2025-01-18

## 2025-01-18 RX ORDER — LACTULOSE 10 G/15 ML
10 SOLUTION, ORAL ORAL ONCE
Refills: 0 | Status: COMPLETED | OUTPATIENT
Start: 2025-01-18 | End: 2025-01-18

## 2025-01-18 RX ORDER — DM/PSEUDOEPHED/ACETAMINOPHEN 10-30-250
12.5 CAPSULE ORAL ONCE
Refills: 0 | Status: DISCONTINUED | OUTPATIENT
Start: 2025-01-18 | End: 2025-01-26

## 2025-01-18 RX ORDER — NYSTATIN 500K UNIT
500000 TABLET ORAL EVERY 6 HOURS
Refills: 0 | Status: DISCONTINUED | OUTPATIENT
Start: 2025-01-18 | End: 2025-01-22

## 2025-01-18 RX ORDER — DM/PSEUDOEPHED/ACETAMINOPHEN 10-30-250
25 CAPSULE ORAL ONCE
Refills: 0 | Status: DISCONTINUED | OUTPATIENT
Start: 2025-01-18 | End: 2025-02-06

## 2025-01-18 RX ORDER — MAGNESIUM SULFATE 0.8 MEQ/ML
2 AMPUL (ML) INJECTION ONCE
Refills: 0 | Status: COMPLETED | OUTPATIENT
Start: 2025-01-18 | End: 2025-01-18

## 2025-01-18 RX ADMIN — Medication 2 TABLET(S): at 21:16

## 2025-01-18 RX ADMIN — ANTISEPTIC SURGICAL SCRUB 1 APPLICATION(S): 0.04 SOLUTION TOPICAL at 09:13

## 2025-01-18 RX ADMIN — Medication 10 MILLIGRAM(S): at 06:10

## 2025-01-18 RX ADMIN — Medication 25 GRAM(S): at 10:10

## 2025-01-18 RX ADMIN — ESCITALOPRAM 10 MILLIGRAM(S): 10 TABLET, FILM COATED ORAL at 11:22

## 2025-01-18 RX ADMIN — PIPERACILLIN SODIUM AND TAZOBACTAM SODIUM 25 GRAM(S): 2; 250 INJECTION, POWDER, FOR SOLUTION INTRAVENOUS at 05:07

## 2025-01-18 RX ADMIN — Medication 100 MILLIGRAM(S): at 06:10

## 2025-01-18 RX ADMIN — Medication 500000 UNIT(S): at 17:20

## 2025-01-18 RX ADMIN — ACETAMINOPHEN 650 MILLIGRAM(S): 160 SUSPENSION ORAL at 06:08

## 2025-01-18 RX ADMIN — PREDNISONE 20 MILLIGRAM(S): 5 TABLET ORAL at 06:10

## 2025-01-18 RX ADMIN — PIPERACILLIN SODIUM AND TAZOBACTAM SODIUM 200 GRAM(S): 2; 250 INJECTION, POWDER, FOR SOLUTION INTRAVENOUS at 00:14

## 2025-01-18 RX ADMIN — TACROLIMUS 1 MILLIGRAM(S): 1 CAPSULE, GELATIN COATED ORAL at 21:16

## 2025-01-18 RX ADMIN — Medication 8: at 17:21

## 2025-01-18 RX ADMIN — Medication 12.5 MILLIGRAM(S): at 06:10

## 2025-01-18 RX ADMIN — BUPROPION HYDROCHLORIDE 300 MILLIGRAM(S): 150 TABLET, EXTENDED RELEASE ORAL at 11:22

## 2025-01-18 RX ADMIN — LEVOTHYROXINE SODIUM 88 MICROGRAM(S): 25 TABLET ORAL at 06:10

## 2025-01-18 RX ADMIN — Medication 6: at 21:22

## 2025-01-18 RX ADMIN — Medication 15 GRAM(S): at 21:16

## 2025-01-18 RX ADMIN — Medication 6: at 12:18

## 2025-01-18 RX ADMIN — PANTOPRAZOLE 40 MILLIGRAM(S): 20 TABLET, DELAYED RELEASE ORAL at 11:21

## 2025-01-18 RX ADMIN — Medication 2: at 09:11

## 2025-01-18 RX ADMIN — PREDNISONE 5 MILLIGRAM(S): 5 TABLET ORAL at 17:20

## 2025-01-18 RX ADMIN — PIPERACILLIN SODIUM AND TAZOBACTAM SODIUM 25 GRAM(S): 2; 250 INJECTION, POWDER, FOR SOLUTION INTRAVENOUS at 14:05

## 2025-01-18 RX ADMIN — ENOXAPARIN SODIUM 40 MILLIGRAM(S): 100 INJECTION SUBCUTANEOUS at 21:17

## 2025-01-18 RX ADMIN — CLONIDINE HYDROCHLORIDE 0.1 MILLIGRAM(S): 0.2 TABLET ORAL at 14:04

## 2025-01-18 RX ADMIN — PIPERACILLIN SODIUM AND TAZOBACTAM SODIUM 25 GRAM(S): 2; 250 INJECTION, POWDER, FOR SOLUTION INTRAVENOUS at 21:17

## 2025-01-18 RX ADMIN — Medication 100 MILLIGRAM(S): at 14:04

## 2025-01-18 RX ADMIN — Medication 4 UNIT(S): at 02:31

## 2025-01-18 RX ADMIN — Medication 10 GRAM(S): at 17:20

## 2025-01-18 RX ADMIN — Medication 8 UNIT(S): at 12:18

## 2025-01-18 RX ADMIN — TACROLIMUS 1 MILLIGRAM(S): 1 CAPSULE, GELATIN COATED ORAL at 08:05

## 2025-01-18 RX ADMIN — Medication 8 UNIT(S): at 17:22

## 2025-01-18 RX ADMIN — CLONIDINE HYDROCHLORIDE 0.1 MILLIGRAM(S): 0.2 TABLET ORAL at 06:09

## 2025-01-18 RX ADMIN — Medication 500000 UNIT(S): at 23:12

## 2025-01-18 RX ADMIN — INSULIN GLARGINE-YFGN 38 UNIT(S): 100 INJECTION, SOLUTION SUBCUTANEOUS at 21:22

## 2025-01-18 RX ADMIN — ROSUVASTATIN CALCIUM 10 MILLIGRAM(S): 10 TABLET, FILM COATED ORAL at 21:16

## 2025-01-18 NOTE — PROGRESS NOTE ADULT - PROBLEM SELECTOR PLAN 1
planned for R mini CHOP, found to be febrile hold off chemo today.  Monitor CBC with diff, transfuse as needed.  Monitor electrolytes, replete as needed.  Daily weights.  Strict I/O.  1/17 - Admitted with PICC line, CXR confirmation. Admitted  for R mini CHOP, found to be febrile on admission hold  chemo today.  Monitor CBC with diff, transfuse as needed.  Monitor electrolytes, replete as needed.  Daily weights.  Strict I/O.  1/18- CT C/A/P- shows moderate stool burden, bowel regimen.

## 2025-01-18 NOTE — PHYSICAL THERAPY INITIAL EVALUATION ADULT - GENERAL OBSERVATIONS, REHAB EVAL
Received pt bedside on unit, +HFNC, tele, cont pulse ox, IV, condom cath. HR 67, O2 sats 96% on room air

## 2025-01-18 NOTE — DIETITIAN INITIAL EVALUATION ADULT - EDUCATION DIETARY MODIFICATIONS
Discussed importance of adequate consumption of meals/supplements to optimize protein-energy intake. Encouraged small/frequent meals, nutrient dense snacks, prioritizing protein foods at meal time./teach back/(1) partially meets; needs review/practice/verbalization

## 2025-01-18 NOTE — DIETITIAN INITIAL EVALUATION ADULT - NSFNSGIIOFT_GEN_A_CORE
-- Last bowel movement on 1/17/25 per nursing flow sheets; pt ordered for Miralax and Senna for bowel regimen

## 2025-01-18 NOTE — PHYSICAL THERAPY INITIAL EVALUATION ADULT - PERTINENT HX OF CURRENT PROBLEM, REHAB EVAL
M 68 y/o M PMHx renal transplant 2012 (2/2 DM, s/p left nephrectomy), peripheral neuropathy, hypothyroidism, retroperitoneal fibrosis, HTN, HLD, GERD, anxiety/depression, ANGELIKA and recent admission at [facility] for sacral osteomyelitis and ESBL.coli urosepsis discharged on 12/18/24 to rehab. While admitted to [facility] was noted to have hypercalcemia and liver masses which were biopsied on 12/16/24, biopsy revealed DLBCL. Patient received R mini CHOP on 12/28 now admitted for cycle #2 Rmini CHOP, upon admission patient is febrile will hold chemotherapy today. XRay Chest 1/17: Left upper extremity PICC with tip overlying SVC. Bilateral perihilar prominence, may represent mild pulmonary vascular congestion.

## 2025-01-18 NOTE — PROGRESS NOTE ADULT - SUBJECTIVE AND OBJECTIVE BOX
Diagnosis:    Protocol/Chemo Regimen:    Day:     Pt endorsed:    Review of Systems:     Pain scale:     Diet:     Allergies    No Known Allergies    Intolerances        ANTIMICROBIALS  piperacillin/tazobactam IVPB.. 3.375 Gram(s) IV Intermittent every 8 hours      HEME/ONC MEDICATIONS  alteplase for catheter clearance 2 milliGRAM(s) Catheter once  alteplase for catheter clearance 2 milliGRAM(s) Catheter once  enoxaparin Injectable 40 milliGRAM(s) SubCutaneous <User Schedule>      STANDING MEDICATIONS  amLODIPine   Tablet 10 milliGRAM(s) Oral daily  buPROPion XL (24-Hour) . 300 milliGRAM(s) Oral daily  carvedilol 12.5 milliGRAM(s) Oral every 12 hours  chlorhexidine 4% Liquid 1 Application(s) Topical <User Schedule>  cloNIDine 0.1 milliGRAM(s) Oral three times a day  dextrose 5%. 1000 milliLiter(s) IV Continuous <Continuous>  dextrose 5%. 1000 milliLiter(s) IV Continuous <Continuous>  dextrose 50% Injectable 25 Gram(s) IV Push once  dextrose 50% Injectable 12.5 Gram(s) IV Push once  dextrose 50% Injectable 25 Gram(s) IV Push once  escitalopram 10 milliGRAM(s) Oral daily  glucagon  Injectable 1 milliGRAM(s) IntraMuscular once  glucagon  Injectable 1 milliGRAM(s) IntraMuscular once  hydrALAZINE 100 milliGRAM(s) Oral every 8 hours  insulin glargine Injectable (LANTUS) 38 Unit(s) SubCutaneous at bedtime  insulin lispro (ADMELOG) corrective regimen sliding scale   SubCutaneous three times a day before meals  insulin lispro (ADMELOG) corrective regimen sliding scale   SubCutaneous at bedtime  insulin lispro Injectable (ADMELOG) 8 Unit(s) SubCutaneous before breakfast  insulin lispro Injectable (ADMELOG) 8 Unit(s) SubCutaneous before lunch  insulin lispro Injectable (ADMELOG) 8 Unit(s) SubCutaneous before dinner  levothyroxine 88 MICROGram(s) Oral daily  lisinopril 10 milliGRAM(s) Oral daily  pantoprazole   Suspension 40 milliGRAM(s) Oral daily  polyethylene glycol 3350 17 Gram(s) Oral at bedtime  predniSONE   Tablet 5 milliGRAM(s) Oral two times a day  rosuvastatin 10 milliGRAM(s) Oral at bedtime  senna 2 Tablet(s) Oral at bedtime  tacrolimus 1 milliGRAM(s) Oral <User Schedule>  tacrolimus 1 milliGRAM(s) Oral at bedtime      PRN MEDICATIONS  acetaminophen     Tablet .. 650 milliGRAM(s) Oral every 6 hours PRN  dextrose Oral Gel 15 Gram(s) Oral once PRN  polyethylene glycol 3350 17 Gram(s) Oral daily PRN  sodium chloride 0.9% lock flush 10 milliLiter(s) IV Push every 1 hour PRN        Vital Signs Last 24 Hrs  T(C): 36.6 (18 Jan 2025 11:49), Max: 39.8 (17 Jan 2025 21:32)  T(F): 97.8 (18 Jan 2025 11:49), Max: 103.6 (17 Jan 2025 21:32)  HR: 65 (18 Jan 2025 11:49) (65 - 84)  BP: 124/65 (18 Jan 2025 11:49) (119/63 - 152/71)  BP(mean): --  RR: 18 (18 Jan 2025 11:49) (16 - 20)  SpO2: 95% (18 Jan 2025 11:49) (92% - 97%)    Parameters below as of 18 Jan 2025 11:49  Patient On (Oxygen Delivery Method): room air        PHYSICAL EXAM  General: NAD  HEENT: PERRLA, EOMOI, clear oropharynx, anicteric sclera, pink conjunctiva  Neck: supple  CV: (+) S1/S2 RRR  Lungs: clear to auscultation, no wheezes or rales  Abdomen: soft, non-tender, non-distended (+) BS  Ext: no clubbing, cyanosis or edema  Skin: no rashes and no petechiae  Neuro: alert and oriented X 3, no focal deficits  Central Line:     RECENT CULTURES:        LABS:                        8.2    7.68  )-----------( 360      ( 18 Jan 2025 07:02 )             25.2         Mean Cell Volume : 92.0 fl  Mean Cell Hemoglobin : 29.9 pg  Mean Cell Hemoglobin Concentration : 32.5 g/dL  Auto Neutrophil # : 7.10 K/uL  Auto Lymphocyte # : 0.10 K/uL  Auto Monocyte # : 0.27 K/uL  Auto Eosinophil # : 0.04 K/uL  Auto Basophil # : 0.02 K/uL  Auto Neutrophil % : 92.4 %  Auto Lymphocyte % : 1.3 %  Auto Monocyte % : 3.5 %  Auto Eosinophil % : 0.5 %  Auto Basophil % : 0.3 %      01-18    131[L]  |  96  |  29[H]  ----------------------------<  233[H]  4.5   |  24  |  0.93    Ca    8.6      18 Jan 2025 07:02  Phos  2.5     01-18  Mg     1.7     01-18    TPro  5.9[L]  /  Alb  2.5[L]  /  TBili  0.4  /  DBili  x   /  AST  50[H]  /  ALT  58[H]  /  AlkPhos  98  01-18  Mg 1.7  Phos 2.5    PT/INR - ( 17 Jan 2025 12:55 )   PT: 13.5 sec;   INR: 1.18 ratio      RADIOLOGY & ADDITIONAL STUDIES:  CT Abdomen and Pelvis No Cont (01.17.25 @ 17:48) >  Extensive new bilateral ground glass nodular opacities, upper lobe   predominant. The etiology is unclear, question pneumonia.  Small right and trace left pleural effusion.  Right hepatic mass is not well evaluated but appears decreased in size   since 12/9/2024. Known splenic mass is poorly seen without contrast.  Confluent retroperitoneal soft tissue mass is unchanged, likely   retroperitoneal fibrosis.       Diagnosis: PTLD    Protocol/Chemo Regimen: (On hold)    Pt endorsed:  +fatigue  +poor appetite  sacral pain, discomfort    Review of Systems:   Denies any nausea, vomiting chest pain, palpitation, SOB, abdominal pain.     Pain scale: sacral area pain, discomfort    Diet: Consistent carb    Allergies: No Known Allergies    ANTIMICROBIALS  piperacillin/tazobactam IVPB.. 3.375 Gram(s) IV Intermittent every 8 hours    HEME/ONC MEDICATIONS  alteplase for catheter clearance 2 milliGRAM(s) Catheter once  alteplase for catheter clearance 2 milliGRAM(s) Catheter once  enoxaparin Injectable 40 milliGRAM(s) SubCutaneous <User Schedule>    STANDING MEDICATIONS  amLODIPine   Tablet 10 milliGRAM(s) Oral daily  buPROPion XL (24-Hour) . 300 milliGRAM(s) Oral daily  carvedilol 12.5 milliGRAM(s) Oral every 12 hours  chlorhexidine 4% Liquid 1 Application(s) Topical <User Schedule>  cloNIDine 0.1 milliGRAM(s) Oral three times a day  dextrose 5%. 1000 milliLiter(s) IV Continuous <Continuous>  dextrose 5%. 1000 milliLiter(s) IV Continuous <Continuous>  dextrose 50% Injectable 25 Gram(s) IV Push once  dextrose 50% Injectable 12.5 Gram(s) IV Push once  dextrose 50% Injectable 25 Gram(s) IV Push once  escitalopram 10 milliGRAM(s) Oral daily  glucagon  Injectable 1 milliGRAM(s) IntraMuscular once  glucagon  Injectable 1 milliGRAM(s) IntraMuscular once  hydrALAZINE 100 milliGRAM(s) Oral every 8 hours  insulin glargine Injectable (LANTUS) 38 Unit(s) SubCutaneous at bedtime  insulin lispro (ADMELOG) corrective regimen sliding scale   SubCutaneous three times a day before meals  insulin lispro (ADMELOG) corrective regimen sliding scale   SubCutaneous at bedtime  insulin lispro Injectable (ADMELOG) 8 Unit(s) SubCutaneous before breakfast  insulin lispro Injectable (ADMELOG) 8 Unit(s) SubCutaneous before lunch  insulin lispro Injectable (ADMELOG) 8 Unit(s) SubCutaneous before dinner  levothyroxine 88 MICROGram(s) Oral daily  lisinopril 10 milliGRAM(s) Oral daily  pantoprazole   Suspension 40 milliGRAM(s) Oral daily  polyethylene glycol 3350 17 Gram(s) Oral at bedtime  predniSONE   Tablet 5 milliGRAM(s) Oral two times a day  rosuvastatin 10 milliGRAM(s) Oral at bedtime  senna 2 Tablet(s) Oral at bedtime  tacrolimus 1 milliGRAM(s) Oral <User Schedule>  tacrolimus 1 milliGRAM(s) Oral at bedtime    PRN MEDICATIONS  acetaminophen     Tablet .. 650 milliGRAM(s) Oral every 6 hours PRN  dextrose Oral Gel 15 Gram(s) Oral once PRN  polyethylene glycol 3350 17 Gram(s) Oral daily PRN  sodium chloride 0.9% lock flush 10 milliLiter(s) IV Push every 1 hour PRN    Vital Signs Last 24 Hrs  T(C): 36.6 (18 Jan 2025 11:49), Max: 39.8 (17 Jan 2025 21:32)  T(F): 97.8 (18 Jan 2025 11:49), Max: 103.6 (17 Jan 2025 21:32)  HR: 65 (18 Jan 2025 11:49) (65 - 84)  BP: 124/65 (18 Jan 2025 11:49) (119/63 - 152/71)  BP(mean): --  RR: 18 (18 Jan 2025 11:49) (16 - 20)  SpO2: 95% (18 Jan 2025 11:49) (92% - 97%)    Parameters below as of 18 Jan 2025 11:49  Patient On (Oxygen Delivery Method): room air    PHYSICAL EXAM  General: ill appearing  HEENT: oral thrush +  CV: (+) S1/S2 RRR  Lungs: diminished B/L  Abdomen: soft, non-tender, non-distended (+) BS  Ext: no clubbing, cyanosis or edema  Skin: no rashes and no petechiae  Neuro: alert and oriented X 3, no focal deficits  Central Line: Left arm D/L PICC, CDI    RECENT CULTURES:  Culture - Blood (01.17.25 @ 11:38)    Specimen Source: .Blood BLOOD   Culture Results:   No growth at 24 hours    LABS:                        8.2    7.68  )-----------( 360      ( 18 Jan 2025 07:02 )             25.2   Mean Cell Volume : 92.0 fl  Mean Cell Hemoglobin : 29.9 pg  Mean Cell Hemoglobin Concentration : 32.5 g/dL  Auto Neutrophil # : 7.10 K/uL  Auto Lymphocyte # : 0.10 K/uL  Auto Monocyte # : 0.27 K/uL  Auto Eosinophil # : 0.04 K/uL  Auto Basophil # : 0.02 K/uL  Auto Neutrophil % : 92.4 %  Auto Lymphocyte % : 1.3 %  Auto Monocyte % : 3.5 %  Auto Eosinophil % : 0.5 %  Auto Basophil % : 0.3 %    01-18    131[L]  |  96  |  29[H]  ----------------------------<  233[H]  4.5   |  24  |  0.93    Ca    8.6      18 Jan 2025 07:02  Phos  2.5     01-18  Mg     1.7     01-18    TPro  5.9[L]  /  Alb  2.5[L]  /  TBili  0.4  /  DBili  x   /  AST  50[H]  /  ALT  58[H]  /  AlkPhos  98  01-18  Mg 1.7  Phos 2.5    PT/INR - ( 17 Jan 2025 12:55 )   PT: 13.5 sec;   INR: 1.18 ratio      RADIOLOGY & ADDITIONAL STUDIES:  CT Abdomen and Pelvis No Cont (01.17.25 @ 17:48) >  Extensive new bilateral ground glass nodular opacities, upper lobe   predominant. The etiology is unclear, question pneumonia.  Small right and trace left pleural effusion.  Right hepatic mass is not well evaluated but appears decreased in size   since 12/9/2024. Known splenic mass is poorly seen without contrast.  Confluent retroperitoneal soft tissue mass is unchanged, likely   retroperitoneal fibrosis.

## 2025-01-18 NOTE — DIETITIAN INITIAL EVALUATION ADULT - ORAL INTAKE PTA/DIET HISTORY
Pt reports no known food allergies/intolerances.   Per [facility] paperwork: Pt was ordered for a No Concentrated Sweets, Low Sodium diet with Regular consistency. Also was ordered for LPS Critical Care 30ml/PO 2x/day

## 2025-01-18 NOTE — DIETITIAN INITIAL EVALUATION ADULT - PROBLEM SELECTOR PLAN 1
Admit to [facility], planned for R mini CHOP, found to be febrile hold off chemo today.  Monitor CBC with diff, transfuse as needed.  Monitor electrolytes, replete as needed.  Daily weights.  Strict I/O.  1/17 - Admitted with PICC line, CXR confirmation.

## 2025-01-18 NOTE — DIETITIAN INITIAL EVALUATION ADULT - PERTINENT LABORATORY DATA
01-18    131[L]  |  96  |  29[H]  ----------------------------<  233[H]  4.5   |  24  |  0.93    Ca    8.6      18 Jan 2025 07:02  Phos  2.5     01-18  Mg     1.7     01-18    TPro  5.9[L]  /  Alb  2.5[L]  /  TBili  0.4  /  DBili  x   /  AST  50[H]  /  ALT  58[H]  /  AlkPhos  98  01-18    POCT Blood Glucose.: 162 mg/dL (01-18-25 @ 09:01)  POCT Blood Glucose.: 328 mg/dL (01-18-25 @ 01:45)  POCT Blood Glucose.: 418 mg/dL (01-17-25 @ 21:21)  POCT Blood Glucose.: 402 mg/dL (01-17-25 @ 21:19)  POCT Blood Glucose.: 326 mg/dL (01-17-25 @ 17:56)  POCT Blood Glucose.: 232 mg/dL (01-17-25 @ 13:18)    A1C with Estimated Average Glucose Result: 7.8 % (12-29-24 @ 07:07)  A1C with Estimated Average Glucose Result: 7.7 % (12-28-24 @ 10:06)  A1C with Estimated Average Glucose Result: 7.4 % (11-30-24 @ 06:40)

## 2025-01-18 NOTE — DIETITIAN INITIAL EVALUATION ADULT - REASON INDICATOR FOR ASSESSMENT
MST Score 2 or > and Stage 2 Pressure Injury or greater  Information obtained from: Review of pt's current medical record, review of transfer paperwork from [facility] Pt, RN

## 2025-01-18 NOTE — DIETITIAN INITIAL EVALUATION ADULT - REASON FOR ADMISSION
Chart Reviewed, Events Noted " 70 y/o M PMHx renal transplant 2012, peripheral neuropathy, hypothyroidism, retroperitoneal fibrosis, HTN, HLD, GERD, anxiety/depression, ANGELIKA presenting with Post-transplant lymphoproliferative disorder"

## 2025-01-18 NOTE — PROGRESS NOTE ADULT - ASSESSMENT
M 70 y/o M PMHx renal transplant 2012 (2/2 DM, s/p left nephrectomy), peripheral neuropathy, hypothyroidism, retroperitoneal fibrosis, HTN, HLD, GERD, anxiety/depression, ANGELIKA and recent admission at [facility] for sacral osteomyelitis and ESBL.coli urosepsis discharged on 12/18/24 to rehab. While admitted to [facility] was noted to have hypercalcemia and liver masses which were biopsied on 12/16/24, biopsy revealed DLBCL. Patient received R mini CHOP on 12/28 now admitted for cycle #2 Rmini CHOP, upon admission patient is febrile will hold chemotherapy today.

## 2025-01-18 NOTE — CHART NOTE - NSCHARTNOTEFT_GEN_A_CORE
Noted on CTA chest and CT A/P preliminary result "Bilateral groundglass and nodular opacities, most prominent in the upper lobes, concerning for pneumonia. Trace left pleural effusion. Rounded atelectasis in the left lower lobe. Mild urinary bladder wall thickening, may represent cystitis. Recommend correlation with urinalysis. Right lower quadrant renal transplant without hydronephrosis. Moderate distal sigmoid and rectal stool burden with perirectal fat stranding, concerning for stercoral colitis." Pt and VSS w/o any complaints at this time. Notified on call heme/onc resident Dr. Cobb to discuss if antibiotics should be started; recommending to start the patient on IV Zosyn (which has been ordered). F/u final results of imaging and BCX x2/UCX collected 1/17. Will endorse to day team in AM, attending to follow.    ICU Vital Signs Last 24 Hrs  T(C): 36.8 (18 Jan 2025 00:39), Max: 39.8 (17 Jan 2025 21:32)  T(F): 98.2 (18 Jan 2025 00:39), Max: 103.6 (17 Jan 2025 21:32)  HR: 70 (18 Jan 2025 00:39) (67 - 81)  BP: 119/63 (18 Jan 2025 00:39) (116/61 - 150/64)  BP(mean): --  ABP: --  ABP(mean): --  RR: 20 (18 Jan 2025 00:39) (16 - 20)  SpO2: 96% (18 Jan 2025 00:39) (88% - 97%)    O2 Parameters below as of 18 Jan 2025 00:39  Patient On (Oxygen Delivery Method): nasal cannula  O2 Flow (L/min): 2      Edin Rene PA-C  Department of Medicine

## 2025-01-18 NOTE — PROGRESS NOTE ADULT - PROBLEM SELECTOR PLAN 2
Not neutropenic.  1/17 - F/u BCX  1/17 - F/U CT C/A/P- Extensive new bilateral ground glass nodular opacities, upper lobe predominant. The etiology is unclear, question pneumonia. Small right and trace left pleural effusion. Right hepatic mass is not well evaluated but appears decreased in size  since 12/9/2024. Known splenic mass is poorly seen without contrast.  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  1/17- F/u Flu/COVID PCR , + for Coronavirus   (last hospitalization: osteomyelitis and E coli urosepsis (12/1/24 - 12/18/24)   [facility] (12/18/24): for osteomyelitis & E coli urosepsis) Not neutropenic.  1/17 - F/u BCX  1/17 - F/U CT C/A/P- Extensive new bilateral ground glass nodular opacities, upper lobe predominant. The etiology is unclear, question pneumonia. Small right and trace left pleural effusion. Right hepatic mass is not well evaluated but appears decreased in size  since 12/9/2024. Known splenic mass is poorly seen without contrast.  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  1/17- F/u Flu/COVID PCR , + for Coronavirus   (last hospitalization: osteomyelitis and E coli urosepsis (12/1/24 - 12/18/24)   [facility] (12/18/24): for osteomyelitis & E coli urosepsis).  1/18- Started Zosyn O/N

## 2025-01-18 NOTE — PROGRESS NOTE ADULT - PROBLEM SELECTOR PLAN 3
1/18 - hyperglycemic O/N, will add premeal insulin today.   Monitor FS AC/HS  Continue Lantus 38 units at HS.  Sliding scale Humalog AC/HS. 1/18 - hyperglycemic O/N, will add premeal insulin today, lower Prednisone to 5 mg BID  Monitor FS AC/HS  Continue Lantus 38 units at HS.  Sliding scale Humalog AC/HS.

## 2025-01-18 NOTE — DIETITIAN INITIAL EVALUATION ADULT - OTHER INFO
None
Hem/Onc:  - Pt re-admitted from [facility] for planned admission on 1/17/24 for what would be day 1 cycle 2 mini [facility](O)P for PTLD but suffered from urosepsis  - Chemotherapy on hold     Renal:  - Hx of renal transplant; Tacrolimus for immunosuppression ordered   - Hyponatremic     Endo:  - Ordered for Prednisone for post-transplant steroid regimen   - A1c=7.8 % (12-29-24)  -  Insulin sliding scale, Lantus 38 U at bedtime and lispro 8 U 3x/day ordered for blood glucose management

## 2025-01-18 NOTE — DIETITIAN INITIAL EVALUATION ADULT - PERTINENT MEDS FT
MEDICATIONS  (STANDING):  alteplase for catheter clearance 2 milliGRAM(s) Catheter once  alteplase for catheter clearance 2 milliGRAM(s) Catheter once  amLODIPine   Tablet 10 milliGRAM(s) Oral daily  buPROPion XL (24-Hour) . 300 milliGRAM(s) Oral daily  carvedilol 12.5 milliGRAM(s) Oral every 12 hours  chlorhexidine 4% Liquid 1 Application(s) Topical <User Schedule>  cloNIDine 0.1 milliGRAM(s) Oral three times a day  dextrose 5%. 1000 milliLiter(s) (50 mL/Hr) IV Continuous <Continuous>  dextrose 5%. 1000 milliLiter(s) (100 mL/Hr) IV Continuous <Continuous>  dextrose 50% Injectable 25 Gram(s) IV Push once  dextrose 50% Injectable 12.5 Gram(s) IV Push once  dextrose 50% Injectable 25 Gram(s) IV Push once  enoxaparin Injectable 40 milliGRAM(s) SubCutaneous <User Schedule>  escitalopram 10 milliGRAM(s) Oral daily  glucagon  Injectable 1 milliGRAM(s) IntraMuscular once  glucagon  Injectable 1 milliGRAM(s) IntraMuscular once  hydrALAZINE 100 milliGRAM(s) Oral every 8 hours  insulin glargine Injectable (LANTUS) 38 Unit(s) SubCutaneous at bedtime  insulin lispro (ADMELOG) corrective regimen sliding scale   SubCutaneous three times a day before meals  insulin lispro (ADMELOG) corrective regimen sliding scale   SubCutaneous at bedtime  insulin lispro Injectable (ADMELOG) 8 Unit(s) SubCutaneous before breakfast  insulin lispro Injectable (ADMELOG) 8 Unit(s) SubCutaneous before lunch  insulin lispro Injectable (ADMELOG) 8 Unit(s) SubCutaneous before dinner  levothyroxine 88 MICROGram(s) Oral daily  lisinopril 10 milliGRAM(s) Oral daily  pantoprazole   Suspension 40 milliGRAM(s) Oral daily  piperacillin/tazobactam IVPB.. 3.375 Gram(s) IV Intermittent every 8 hours  polyethylene glycol 3350 17 Gram(s) Oral at bedtime  predniSONE   Tablet 5 milliGRAM(s) Oral two times a day  rosuvastatin 10 milliGRAM(s) Oral at bedtime  senna 2 Tablet(s) Oral at bedtime  tacrolimus 1 milliGRAM(s) Oral <User Schedule>  tacrolimus 1 milliGRAM(s) Oral at bedtime    MEDICATIONS  (PRN):  acetaminophen     Tablet .. 650 milliGRAM(s) Oral every 6 hours PRN Temp greater or equal to 38C (100.4F), Mild Pain (1 - 3)  dextrose Oral Gel 15 Gram(s) Oral once PRN Blood Glucose LESS THAN 70 milliGRAM(s)/deciliter  polyethylene glycol 3350 17 Gram(s) Oral daily PRN for constipation  sodium chloride 0.9% lock flush 10 milliLiter(s) IV Push every 1 hour PRN Pre/post blood products, medications, blood draw, and to maintain line patency

## 2025-01-18 NOTE — DIETITIAN INITIAL EVALUATION ADULT - SIGNS/SYMPTOMS
DLBCL (diffuse large B cell lymphoma) and deep tissue injuries, present on admission.  >/=5% weight loss X 1 month; mild muscle wasting and fat loss

## 2025-01-18 NOTE — DIETITIAN INITIAL EVALUATION ADULT - ADD RECOMMEND
1. Continue no therapeutic dietary restrictions to optimize PO intakes  2. RD to add mighty shakes 3x/day to augment PO intakes of meals  3. Consider multivitamin and vitamin C supplements if no medical contraindications to aid in wound healing.   4. Encourage adequate PO intakes as tolerated. Staff to provide assistance with feeding during meal times as warranted by patient   5. Monitor PO intake, GI tolerance, skin integrity and labs. RD remains available if needed, pt is aware.   6. Malnutrition Sticker placed in pt. chart  1. Continue Consistent Carbohydrate therapeutic dietary restriction.   2. RD to add  diet mighty shakes 3x/day to augment PO intakes of meals  3. Consider multivitamin and vitamin C supplements if no medical contraindications to aid in wound healing.   4. Encourage adequate PO intakes as tolerated. Staff to provide assistance with feeding during meal times as warranted by patient   5. Monitor PO intake, GI tolerance, skin integrity and labs. RD remains available if needed, pt is aware.   6. Malnutrition Sticker placed in pt. chart

## 2025-01-18 NOTE — DIETITIAN INITIAL EVALUATION ADULT - NSFNSPHYEXAMSKINFT_GEN_A_CORE
Per nursing wound care note on 1/17/25:  --bilateral sacrum/ buttock deep tissue injury with evolution, present on admission.  --right ischium deep tissue injury, present on admission.  --left ischium deep tissue injury, present on admission.   Per nursing wound care note on 1/17/25:  --bilateral sacrum/ buttock deep tissue injury with evolution, present on admission.  --right ischium deep tissue injury, present on admission.  --left ischium deep tissue injury, present on admission.

## 2025-01-18 NOTE — DIETITIAN INITIAL EVALUATION ADULT - NS FNS REASON FOR WEIGHT CHANG
Weights:     Current Admission Weights:  - Dosing weight:  75.6 kg/166.7 pounds  (1/17/25)  - Daily weight: 72.7 kg/ 160.3 pounds  (1/18/25)  - Bed Scale Weight: 73 kg/ 161 pounds (1/18/25); obtained by RD     Weight history:  per [facility] HIE:  - 92kg/ 202.86 pounds (12/10/24)    per previous RD notes:  - 79.2 kg/ 174.2 pounds (12/29/24)    Weight Change:  - Significant weight loss Noted X 1 month

## 2025-01-18 NOTE — PROGRESS NOTE ADULT - NS ATTEND AMEND GEN_ALL_CORE FT
.    Primary: [place]    Vital Signs Last 24 Hrs  T(C): 36.8 (18 Jan 2025 00:39), Max: 39.8 (17 Jan 2025 21:32)  T(F): 98.2 (18 Jan 2025 00:39), Max: 103.6 (17 Jan 2025 21:32)  HR: 70 (18 Jan 2025 00:39) (67 - 81)  BP: 119/63 (18 Jan 2025 00:39) (116/61 - 150/64)  BP(mean): --  RR: 20 (18 Jan 2025 00:39) (16 - 20)  SpO2: 96% (18 Jan 2025 00:39) (88% - 97%)    Parameters below as of 18 Jan 2025 00:39  Patient On (Oxygen Delivery Method): nasal cannula  O2 Flow (L/min): 2    MEDICATIONS  (STANDING):  alteplase for catheter clearance 2 milliGRAM(s) Catheter once  alteplase for catheter clearance 2 milliGRAM(s) Catheter once  amLODIPine   Tablet 10 milliGRAM(s) Oral daily  buPROPion XL (24-Hour) . 300 milliGRAM(s) Oral daily  carvedilol 12.5 milliGRAM(s) Oral every 12 hours  chlorhexidine 4% Liquid 1 Application(s) Topical <User Schedule>  cloNIDine 0.1 milliGRAM(s) Oral three times a day  dextrose 5%. 1000 milliLiter(s) (50 mL/Hr) IV Continuous <Continuous>  dextrose 5%. 1000 milliLiter(s) (100 mL/Hr) IV Continuous <Continuous>  dextrose 50% Injectable 25 Gram(s) IV Push once  dextrose 50% Injectable 12.5 Gram(s) IV Push once  dextrose 50% Injectable 25 Gram(s) IV Push once  enoxaparin Injectable 40 milliGRAM(s) SubCutaneous <User Schedule>  escitalopram 10 milliGRAM(s) Oral daily  glucagon  Injectable 1 milliGRAM(s) IntraMuscular once  hydrALAZINE 100 milliGRAM(s) Oral every 8 hours  insulin glargine Injectable (LANTUS) 38 Unit(s) SubCutaneous at bedtime  insulin lispro (ADMELOG) corrective regimen sliding scale   SubCutaneous three times a day before meals  insulin lispro (ADMELOG) corrective regimen sliding scale   SubCutaneous at bedtime  levothyroxine 88 MICROGram(s) Oral daily  lisinopril 10 milliGRAM(s) Oral daily  pantoprazole   Suspension 40 milliGRAM(s) Oral daily  piperacillin/tazobactam IVPB.- 3.375 Gram(s) IV Intermittent once  piperacillin/tazobactam IVPB.. 3.375 Gram(s) IV Intermittent every 8 hours  polyethylene glycol 3350 17 Gram(s) Oral at bedtime  predniSONE   Tablet 20 milliGRAM(s) Oral daily  rosuvastatin 10 milliGRAM(s) Oral at bedtime  senna 2 Tablet(s) Oral at bedtime  tacrolimus 1 milliGRAM(s) Oral <User Schedule>  tacrolimus 1 milliGRAM(s) Oral at bedtime      Assessment: Piero returned for a scheduled admission from [facility] in [place] on 1/17/24 for what would be day 1 cycle 2 mini RCH(O)P for PTLD but suffered from urosepsis with Klebsiella ESBL (Zosyn sensitive) in urine and nasal corona virus      PMHx::  1) renal transplant 2012: H/O left nephrectomy; renal transplant was secondary to DM  2) AODM  3) HLD  4) hypothyroidism  5) peripheral neuropathy  6) retroperitoneal fibrosis  7) GERD  8) HTN  9) anxiety/depression  10 obstructive sleep apnea  11 [place] [facility] osteomyelitis and E coli urosepsis (12/1/24 - 12/18/24)      wife (Ludmila), daughter (Luz Marina) and son (Jose J).    Plan:  Hold planned chemotherapy. given toxic presentation    ID:   zosyn (1/17/25 - )  concurrently concerned for return of osteo    Radiographs: chest/abd pelvis (1/17/25): pending    Renal: continue /pred     Over 60 minutes were spent in direct patient care and care coordination. .    Primary: [place]    Vital Signs Last 24 Hrs  T(C): 36.8 (18 Jan 2025 00:39), Max: 39.8 (17 Jan 2025 21:32)  T(F): 98.2 (18 Jan 2025 00:39), Max: 103.6 (17 Jan 2025 21:32)  HR: 70 (18 Jan 2025 00:39) (67 - 81)  BP: 119/63 (18 Jan 2025 00:39) (116/61 - 150/64)  BP(mean): --  RR: 20 (18 Jan 2025 00:39) (16 - 20)  SpO2: 96% (18 Jan 2025 00:39) (88% - 97%)    Parameters below as of 18 Jan 2025 00:39  Patient On (Oxygen Delivery Method): nasal cannula  O2 Flow (L/min): 2    MEDICATIONS  (STANDING):  alteplase for catheter clearance 2 milliGRAM(s) Catheter once  alteplase for catheter clearance 2 milliGRAM(s) Catheter once  amLODIPine   Tablet 10 milliGRAM(s) Oral daily  buPROPion XL (24-Hour) . 300 milliGRAM(s) Oral daily  carvedilol 12.5 milliGRAM(s) Oral every 12 hours  chlorhexidine 4% Liquid 1 Application(s) Topical <User Schedule>  cloNIDine 0.1 milliGRAM(s) Oral three times a day  dextrose 5%. 1000 milliLiter(s) (50 mL/Hr) IV Continuous <Continuous>  dextrose 5%. 1000 milliLiter(s) (100 mL/Hr) IV Continuous <Continuous>  dextrose 50% Injectable 25 Gram(s) IV Push once  dextrose 50% Injectable 12.5 Gram(s) IV Push once  dextrose 50% Injectable 25 Gram(s) IV Push once  enoxaparin Injectable 40 milliGRAM(s) SubCutaneous <User Schedule>  escitalopram 10 milliGRAM(s) Oral daily  glucagon  Injectable 1 milliGRAM(s) IntraMuscular once  hydrALAZINE 100 milliGRAM(s) Oral every 8 hours  insulin glargine Injectable (LANTUS) 38 Unit(s) SubCutaneous at bedtime  insulin lispro (ADMELOG) corrective regimen sliding scale   SubCutaneous three times a day before meals  insulin lispro (ADMELOG) corrective regimen sliding scale   SubCutaneous at bedtime  levothyroxine 88 MICROGram(s) Oral daily  lisinopril 10 milliGRAM(s) Oral daily  pantoprazole   Suspension 40 milliGRAM(s) Oral daily  piperacillin/tazobactam IVPB.- 3.375 Gram(s) IV Intermittent once  piperacillin/tazobactam IVPB.. 3.375 Gram(s) IV Intermittent every 8 hours  polyethylene glycol 3350 17 Gram(s) Oral at bedtime  predniSONE   Tablet 20 milliGRAM(s) Oral daily  rosuvastatin 10 milliGRAM(s) Oral at bedtime  senna 2 Tablet(s) Oral at bedtime  tacrolimus 1 milliGRAM(s) Oral <User Schedule>  tacrolimus 1 milliGRAM(s) Oral at bedtime      Assessment: Piero returned for a scheduled admission from [facility] in [place] on 1/17/24 for what would be day 1 cycle 2 mini RCH(O)P for PTLD but suffered from urosepsis with Klebsiella ESBL (Zosyn sensitive) in urine and nasal corona virus      PMHx::  1) renal transplant 2012: H/O left nephrectomy; renal transplant was secondary to DM  2) AODM  3) HLD  4) hypothyroidism  5) peripheral neuropathy  6) retroperitoneal fibrosis  7) GERD  8) HTN  9) anxiety/depression  10 obstructive sleep apnea  11 [place] Hospitalization osteomyelitis and E coli urosepsis (12/1/24 - 12/18/24)      wife (Ludmila), daughter (Luz Marina) and son (Jose J).    Plan:  Hold planned chemotherapy. given toxic presentation    ID:   zosyn (1/17/25 - )  concurrently concerned for return of osteo    Radiographs: chest/abd pelvis (1/17/25): pending    Renal: continue /pred -- decrease pred 5mg bid (50% dose reduction)     Over 60 minutes were spent in direct patient care and care coordination.

## 2025-01-19 LAB
ALBUMIN SERPL ELPH-MCNC: 2.4 G/DL — LOW (ref 3.3–5)
ALP SERPL-CCNC: 90 U/L — SIGNIFICANT CHANGE UP (ref 40–120)
ALT FLD-CCNC: 63 U/L — HIGH (ref 10–45)
ANION GAP SERPL CALC-SCNC: 10 MMOL/L — SIGNIFICANT CHANGE UP (ref 5–17)
AST SERPL-CCNC: 64 U/L — HIGH (ref 10–40)
BASOPHILS # BLD AUTO: 0 K/UL — SIGNIFICANT CHANGE UP (ref 0–0.2)
BASOPHILS NFR BLD AUTO: 0 % — SIGNIFICANT CHANGE UP (ref 0–2)
BILIRUB SERPL-MCNC: 0.2 MG/DL — SIGNIFICANT CHANGE UP (ref 0.2–1.2)
BUN SERPL-MCNC: 39 MG/DL — HIGH (ref 7–23)
CALCIUM SERPL-MCNC: 8.5 MG/DL — SIGNIFICANT CHANGE UP (ref 8.4–10.5)
CHLORIDE SERPL-SCNC: 97 MMOL/L — SIGNIFICANT CHANGE UP (ref 96–108)
CO2 SERPL-SCNC: 25 MMOL/L — SIGNIFICANT CHANGE UP (ref 22–31)
CREAT SERPL-MCNC: 1.04 MG/DL — SIGNIFICANT CHANGE UP (ref 0.5–1.3)
EGFR: 79 ML/MIN/1.73M2 — SIGNIFICANT CHANGE UP
EOSINOPHIL # BLD AUTO: 0.01 K/UL — SIGNIFICANT CHANGE UP (ref 0–0.5)
EOSINOPHIL NFR BLD AUTO: 0.2 % — SIGNIFICANT CHANGE UP (ref 0–6)
GLUCOSE BLDC GLUCOMTR-MCNC: 130 MG/DL — HIGH (ref 70–99)
GLUCOSE BLDC GLUCOMTR-MCNC: 162 MG/DL — HIGH (ref 70–99)
GLUCOSE BLDC GLUCOMTR-MCNC: 241 MG/DL — HIGH (ref 70–99)
GLUCOSE BLDC GLUCOMTR-MCNC: 259 MG/DL — HIGH (ref 70–99)
GLUCOSE SERPL-MCNC: 255 MG/DL — HIGH (ref 70–99)
HCT VFR BLD CALC: 22.4 % — LOW (ref 39–50)
HGB BLD-MCNC: 7.2 G/DL — LOW (ref 13–17)
IMM GRANULOCYTES NFR BLD AUTO: 2.9 % — HIGH (ref 0–0.9)
LYMPHOCYTES # BLD AUTO: 0.16 K/UL — LOW (ref 1–3.3)
LYMPHOCYTES # BLD AUTO: 2.4 % — LOW (ref 13–44)
MAGNESIUM SERPL-MCNC: 2.3 MG/DL — SIGNIFICANT CHANGE UP (ref 1.6–2.6)
MCHC RBC-ENTMCNC: 30.1 PG — SIGNIFICANT CHANGE UP (ref 27–34)
MCHC RBC-ENTMCNC: 32.1 G/DL — SIGNIFICANT CHANGE UP (ref 32–36)
MCV RBC AUTO: 93.7 FL — SIGNIFICANT CHANGE UP (ref 80–100)
MONOCYTES # BLD AUTO: 0.32 K/UL — SIGNIFICANT CHANGE UP (ref 0–0.9)
MONOCYTES NFR BLD AUTO: 4.9 % — SIGNIFICANT CHANGE UP (ref 2–14)
NEUTROPHILS # BLD AUTO: 5.87 K/UL — SIGNIFICANT CHANGE UP (ref 1.8–7.4)
NEUTROPHILS NFR BLD AUTO: 89.6 % — HIGH (ref 43–77)
NRBC # BLD: 0 /100 WBCS — SIGNIFICANT CHANGE UP (ref 0–0)
NRBC BLD-RTO: 0 /100 WBCS — SIGNIFICANT CHANGE UP (ref 0–0)
NT-PROBNP SERPL-SCNC: 477 PG/ML — HIGH (ref 0–300)
PHOSPHATE SERPL-MCNC: 2.8 MG/DL — SIGNIFICANT CHANGE UP (ref 2.5–4.5)
PLATELET # BLD AUTO: 395 K/UL — SIGNIFICANT CHANGE UP (ref 150–400)
POTASSIUM SERPL-MCNC: 4.5 MMOL/L — SIGNIFICANT CHANGE UP (ref 3.5–5.3)
POTASSIUM SERPL-SCNC: 4.5 MMOL/L — SIGNIFICANT CHANGE UP (ref 3.5–5.3)
PROT SERPL-MCNC: 5.6 G/DL — LOW (ref 6–8.3)
RBC # BLD: 2.39 M/UL — LOW (ref 4.2–5.8)
RBC # FLD: 18.9 % — HIGH (ref 10.3–14.5)
SODIUM SERPL-SCNC: 132 MMOL/L — LOW (ref 135–145)
WBC # BLD: 6.55 K/UL — SIGNIFICANT CHANGE UP (ref 3.8–10.5)
WBC # FLD AUTO: 6.55 K/UL — SIGNIFICANT CHANGE UP (ref 3.8–10.5)

## 2025-01-19 PROCEDURE — 99233 SBSQ HOSP IP/OBS HIGH 50: CPT | Mod: FS

## 2025-01-19 RX ORDER — LACTULOSE 10 G/15 ML
10 SOLUTION, ORAL ORAL EVERY 6 HOURS
Refills: 0 | Status: DISCONTINUED | OUTPATIENT
Start: 2025-01-19 | End: 2025-01-27

## 2025-01-19 RX ORDER — ACETAMINOPHEN 160 MG/5ML
1000 SUSPENSION ORAL ONCE
Refills: 0 | Status: DISCONTINUED | OUTPATIENT
Start: 2025-01-19 | End: 2025-01-19

## 2025-01-19 RX ADMIN — Medication 2 TABLET(S): at 21:43

## 2025-01-19 RX ADMIN — Medication 8 UNIT(S): at 08:40

## 2025-01-19 RX ADMIN — BUPROPION HYDROCHLORIDE 300 MILLIGRAM(S): 150 TABLET, EXTENDED RELEASE ORAL at 13:39

## 2025-01-19 RX ADMIN — PIPERACILLIN SODIUM AND TAZOBACTAM SODIUM 25 GRAM(S): 2; 250 INJECTION, POWDER, FOR SOLUTION INTRAVENOUS at 13:39

## 2025-01-19 RX ADMIN — Medication 6: at 12:33

## 2025-01-19 RX ADMIN — Medication 500000 UNIT(S): at 17:47

## 2025-01-19 RX ADMIN — ENOXAPARIN SODIUM 40 MILLIGRAM(S): 100 INJECTION SUBCUTANEOUS at 21:43

## 2025-01-19 RX ADMIN — Medication 8 UNIT(S): at 17:46

## 2025-01-19 RX ADMIN — Medication 15 GRAM(S): at 21:43

## 2025-01-19 RX ADMIN — Medication 2: at 17:45

## 2025-01-19 RX ADMIN — PANTOPRAZOLE 40 MILLIGRAM(S): 20 TABLET, DELAYED RELEASE ORAL at 13:39

## 2025-01-19 RX ADMIN — Medication 10 GRAM(S): at 17:46

## 2025-01-19 RX ADMIN — CLONIDINE HYDROCHLORIDE 0.1 MILLIGRAM(S): 0.2 TABLET ORAL at 13:39

## 2025-01-19 RX ADMIN — Medication 500000 UNIT(S): at 13:39

## 2025-01-19 RX ADMIN — INSULIN GLARGINE-YFGN 38 UNIT(S): 100 INJECTION, SOLUTION SUBCUTANEOUS at 21:44

## 2025-01-19 RX ADMIN — ESCITALOPRAM 10 MILLIGRAM(S): 10 TABLET, FILM COATED ORAL at 13:39

## 2025-01-19 RX ADMIN — PIPERACILLIN SODIUM AND TAZOBACTAM SODIUM 25 GRAM(S): 2; 250 INJECTION, POWDER, FOR SOLUTION INTRAVENOUS at 21:44

## 2025-01-19 RX ADMIN — TACROLIMUS 1 MILLIGRAM(S): 1 CAPSULE, GELATIN COATED ORAL at 08:40

## 2025-01-19 RX ADMIN — PIPERACILLIN SODIUM AND TAZOBACTAM SODIUM 25 GRAM(S): 2; 250 INJECTION, POWDER, FOR SOLUTION INTRAVENOUS at 05:29

## 2025-01-19 RX ADMIN — Medication 10 GRAM(S): at 23:18

## 2025-01-19 RX ADMIN — Medication 500000 UNIT(S): at 23:18

## 2025-01-19 RX ADMIN — TACROLIMUS 1 MILLIGRAM(S): 1 CAPSULE, GELATIN COATED ORAL at 21:43

## 2025-01-19 RX ADMIN — ACETAMINOPHEN 650 MILLIGRAM(S): 160 SUSPENSION ORAL at 19:15

## 2025-01-19 RX ADMIN — Medication 100 MILLIGRAM(S): at 21:43

## 2025-01-19 RX ADMIN — ACETAMINOPHEN 650 MILLIGRAM(S): 160 SUSPENSION ORAL at 23:27

## 2025-01-19 RX ADMIN — ROSUVASTATIN CALCIUM 10 MILLIGRAM(S): 10 TABLET, FILM COATED ORAL at 21:43

## 2025-01-19 RX ADMIN — CLONIDINE HYDROCHLORIDE 0.1 MILLIGRAM(S): 0.2 TABLET ORAL at 21:43

## 2025-01-19 RX ADMIN — Medication 4: at 08:39

## 2025-01-19 RX ADMIN — POLYETHYLENE GLYCOL 3350 17 GRAM(S): 17 POWDER, FOR SOLUTION ORAL at 21:43

## 2025-01-19 RX ADMIN — Medication 100 MILLIGRAM(S): at 13:43

## 2025-01-19 RX ADMIN — PREDNISONE 5 MILLIGRAM(S): 5 TABLET ORAL at 05:29

## 2025-01-19 RX ADMIN — ACETAMINOPHEN 650 MILLIGRAM(S): 160 SUSPENSION ORAL at 17:47

## 2025-01-19 RX ADMIN — ANTISEPTIC SURGICAL SCRUB 1 APPLICATION(S): 0.04 SOLUTION TOPICAL at 08:44

## 2025-01-19 RX ADMIN — Medication 12.5 MILLIGRAM(S): at 17:46

## 2025-01-19 RX ADMIN — Medication 8 UNIT(S): at 12:33

## 2025-01-19 RX ADMIN — LEVOTHYROXINE SODIUM 88 MICROGRAM(S): 25 TABLET ORAL at 05:29

## 2025-01-19 RX ADMIN — PREDNISONE 5 MILLIGRAM(S): 5 TABLET ORAL at 17:47

## 2025-01-19 RX ADMIN — Medication 500000 UNIT(S): at 05:28

## 2025-01-19 NOTE — PROGRESS NOTE ADULT - SUBJECTIVE AND OBJECTIVE BOX
Diagnosis:    Protocol/Chemo Regimen:    Day:     Pt endorsed:    Review of Systems:     Pain scale:     Diet:     Allergies    No Known Allergies    Intolerances        ANTIMICROBIALS  nystatin    Suspension 090822 Unit(s) Oral every 6 hours  piperacillin/tazobactam IVPB.. 3.375 Gram(s) IV Intermittent every 8 hours      HEME/ONC MEDICATIONS  alteplase for catheter clearance 2 milliGRAM(s) Catheter once  alteplase for catheter clearance 2 milliGRAM(s) Catheter once  enoxaparin Injectable 40 milliGRAM(s) SubCutaneous <User Schedule>      STANDING MEDICATIONS  amLODIPine   Tablet 10 milliGRAM(s) Oral daily  buPROPion XL (24-Hour) . 300 milliGRAM(s) Oral daily  carvedilol 12.5 milliGRAM(s) Oral every 12 hours  chlorhexidine 4% Liquid 1 Application(s) Topical <User Schedule>  cloNIDine 0.1 milliGRAM(s) Oral three times a day  dextrose 5%. 1000 milliLiter(s) IV Continuous <Continuous>  dextrose 5%. 1000 milliLiter(s) IV Continuous <Continuous>  dextrose 50% Injectable 25 Gram(s) IV Push once  dextrose 50% Injectable 12.5 Gram(s) IV Push once  dextrose 50% Injectable 25 Gram(s) IV Push once  escitalopram 10 milliGRAM(s) Oral daily  glucagon  Injectable 1 milliGRAM(s) IntraMuscular once  glucagon  Injectable 1 milliGRAM(s) IntraMuscular once  hydrALAZINE 100 milliGRAM(s) Oral every 8 hours  insulin glargine Injectable (LANTUS) 38 Unit(s) SubCutaneous at bedtime  insulin lispro (ADMELOG) corrective regimen sliding scale   SubCutaneous three times a day before meals  insulin lispro (ADMELOG) corrective regimen sliding scale   SubCutaneous at bedtime  insulin lispro Injectable (ADMELOG) 8 Unit(s) SubCutaneous before breakfast  insulin lispro Injectable (ADMELOG) 8 Unit(s) SubCutaneous before lunch  insulin lispro Injectable (ADMELOG) 8 Unit(s) SubCutaneous before dinner  lactulose Syrup 15 Gram(s) Oral <User Schedule>  lactulose Syrup 10 Gram(s) Oral every 6 hours  levothyroxine 88 MICROGram(s) Oral daily  lisinopril 10 milliGRAM(s) Oral daily  pantoprazole   Suspension 40 milliGRAM(s) Oral daily  polyethylene glycol 3350 17 Gram(s) Oral at bedtime  predniSONE   Tablet 5 milliGRAM(s) Oral two times a day  rosuvastatin 10 milliGRAM(s) Oral at bedtime  senna 2 Tablet(s) Oral at bedtime  tacrolimus 1 milliGRAM(s) Oral <User Schedule>  tacrolimus 1 milliGRAM(s) Oral at bedtime      PRN MEDICATIONS  acetaminophen     Tablet .. 650 milliGRAM(s) Oral every 6 hours PRN  dextrose Oral Gel 15 Gram(s) Oral once PRN  polyethylene glycol 3350 17 Gram(s) Oral daily PRN  sodium chloride 0.9% lock flush 10 milliLiter(s) IV Push every 1 hour PRN        Vital Signs Last 24 Hrs  T(C): 36.9 (19 Jan 2025 13:30), Max: 36.9 (19 Jan 2025 13:30)  T(F): 98.5 (19 Jan 2025 13:30), Max: 98.5 (19 Jan 2025 13:30)  HR: 77 (19 Jan 2025 13:30) (62 - 77)  BP: 135/66 (19 Jan 2025 13:30) (114/57 - 135/66)  BP(mean): --  RR: 18 (19 Jan 2025 13:30) (17 - 18)  SpO2: 96% (19 Jan 2025 13:30) (96% - 99%)    Parameters below as of 19 Jan 2025 13:30  Patient On (Oxygen Delivery Method): room air        PHYSICAL EXAM  General: NAD  HEENT: PERRLA, EOMOI, clear oropharynx, anicteric sclera, pink conjunctiva  Neck: supple  CV: (+) S1/S2 RRR  Lungs: clear to auscultation, no wheezes or rales  Abdomen: soft, non-tender, non-distended (+) BS  Ext: no clubbing, cyanosis or edema  Skin: no rashes and no petechiae  Neuro: alert and oriented X 3, no focal deficits  Central Line:     RECENT CULTURES:  01-17 @ 17:09  Clean Catch Clean Catch (Midstream)  --  --  --    >=3 organisms. Probable collection contamination.  --  01-17 @ 11:38  .Blood BLOOD  --  --  --    No growth at 48 Hours  --        LABS:                        7.2    6.55  )-----------( 395      ( 19 Jan 2025 06:51 )             22.4         Mean Cell Volume : 93.7 fl  Mean Cell Hemoglobin : 30.1 pg  Mean Cell Hemoglobin Concentration : 32.1 g/dL  Auto Neutrophil # : 5.87 K/uL  Auto Lymphocyte # : 0.16 K/uL  Auto Monocyte # : 0.32 K/uL  Auto Eosinophil # : 0.01 K/uL  Auto Basophil # : 0.00 K/uL  Auto Neutrophil % : 89.6 %  Auto Lymphocyte % : 2.4 %  Auto Monocyte % : 4.9 %  Auto Eosinophil % : 0.2 %  Auto Basophil % : 0.0 %      01-19    132[L]  |  97  |  39[H]  ----------------------------<  255[H]  4.5   |  25  |  1.04    Ca    8.5      19 Jan 2025 06:54  Phos  2.8     01-19  Mg     2.3     01-19    TPro  5.6[L]  /  Alb  2.4[L]  /  TBili  0.2  /  DBili  x   /  AST  64[H]  /  ALT  63[H]  /  AlkPhos  90  01-19      Mg 2.3  Phos 2.8              RADIOLOGY & ADDITIONAL STUDIES:         Diagnosis: PTLD    Protocol/Chemo Regimen: (On hold)    Pt endorsed:  +fatigue  +poor appetite  sacral pain, discomfort    Review of Systems:   Denies any nausea, vomiting chest pain, palpitation, SOB, abdominal pain.     Pain scale: sacral area pain, discomfort    Diet: Consistent carb    Allergies: No Known Allergies    ANTIMICROBIALS  nystatin    Suspension 515613 Unit(s) Oral every 6 hours  piperacillin/tazobactam IVPB.. 3.375 Gram(s) IV Intermittent every 8 hours    HEME/ONC MEDICATIONS  alteplase for catheter clearance 2 milliGRAM(s) Catheter once  alteplase for catheter clearance 2 milliGRAM(s) Catheter once  enoxaparin Injectable 40 milliGRAM(s) SubCutaneous <User Schedule>    STANDING MEDICATIONS  amLODIPine   Tablet 10 milliGRAM(s) Oral daily  buPROPion XL (24-Hour) . 300 milliGRAM(s) Oral daily  carvedilol 12.5 milliGRAM(s) Oral every 12 hours  chlorhexidine 4% Liquid 1 Application(s) Topical <User Schedule>  cloNIDine 0.1 milliGRAM(s) Oral three times a day  dextrose 5%. 1000 milliLiter(s) IV Continuous <Continuous>  dextrose 5%. 1000 milliLiter(s) IV Continuous <Continuous>  dextrose 50% Injectable 25 Gram(s) IV Push once  dextrose 50% Injectable 12.5 Gram(s) IV Push once  dextrose 50% Injectable 25 Gram(s) IV Push once  escitalopram 10 milliGRAM(s) Oral daily  glucagon  Injectable 1 milliGRAM(s) IntraMuscular once  glucagon  Injectable 1 milliGRAM(s) IntraMuscular once  hydrALAZINE 100 milliGRAM(s) Oral every 8 hours  insulin glargine Injectable (LANTUS) 38 Unit(s) SubCutaneous at bedtime  insulin lispro (ADMELOG) corrective regimen sliding scale   SubCutaneous three times a day before meals  insulin lispro (ADMELOG) corrective regimen sliding scale   SubCutaneous at bedtime  insulin lispro Injectable (ADMELOG) 8 Unit(s) SubCutaneous before breakfast  insulin lispro Injectable (ADMELOG) 8 Unit(s) SubCutaneous before lunch  insulin lispro Injectable (ADMELOG) 8 Unit(s) SubCutaneous before dinner  lactulose Syrup 15 Gram(s) Oral <User Schedule>  lactulose Syrup 10 Gram(s) Oral every 6 hours  levothyroxine 88 MICROGram(s) Oral daily  lisinopril 10 milliGRAM(s) Oral daily  pantoprazole   Suspension 40 milliGRAM(s) Oral daily  polyethylene glycol 3350 17 Gram(s) Oral at bedtime  predniSONE   Tablet 5 milliGRAM(s) Oral two times a day  rosuvastatin 10 milliGRAM(s) Oral at bedtime  senna 2 Tablet(s) Oral at bedtime  tacrolimus 1 milliGRAM(s) Oral <User Schedule>  tacrolimus 1 milliGRAM(s) Oral at bedtime    PRN MEDICATIONS  acetaminophen     Tablet .. 650 milliGRAM(s) Oral every 6 hours PRN  dextrose Oral Gel 15 Gram(s) Oral once PRN  polyethylene glycol 3350 17 Gram(s) Oral daily PRN  sodium chloride 0.9% lock flush 10 milliLiter(s) IV Push every 1 hour PRN    Vital Signs Last 24 Hrs  T(C): 36.9 (19 Jan 2025 13:30), Max: 36.9 (19 Jan 2025 13:30)  T(F): 98.5 (19 Jan 2025 13:30), Max: 98.5 (19 Jan 2025 13:30)  HR: 77 (19 Jan 2025 13:30) (62 - 77)  BP: 135/66 (19 Jan 2025 13:30) (114/57 - 135/66)  BP(mean): --  RR: 18 (19 Jan 2025 13:30) (17 - 18)  SpO2: 96% (19 Jan 2025 13:30) (96% - 99%)    Parameters below as of 19 Jan 2025 13:30  Patient On (Oxygen Delivery Method): room air    PHYSICAL EXAM  General: NAD  HEENT: oral thrush +  CV: (+) S1/S2 RRR  Lungs: diminished B/L  Abdomen: soft, non-tender, non-distended (+) BS  Ext: no clubbing, cyanosis or edema  Skin: no rashes and no petechiae  Neuro: alert and oriented X 3, no focal deficits  Central Line: Left arm D/L PICC, CDI    RECENT CULTURES:  01-17 @ 17:09  Clean Catch Clean Catch (Midstream)  >=3 organisms. Probable collection contamination.    01-17 @ 11:38  .Blood BLOOD  No growth at 48 Hours    LABS:                        7.2    6.55  )-----------( 395      ( 19 Jan 2025 06:51 )             22.4     Mean Cell Volume : 93.7 fl  Mean Cell Hemoglobin : 30.1 pg  Mean Cell Hemoglobin Concentration : 32.1 g/dL  Auto Neutrophil # : 5.87 K/uL  Auto Lymphocyte # : 0.16 K/uL  Auto Monocyte # : 0.32 K/uL  Auto Eosinophil # : 0.01 K/uL  Auto Basophil # : 0.00 K/uL  Auto Neutrophil % : 89.6 %  Auto Lymphocyte % : 2.4 %  Auto Monocyte % : 4.9 %  Auto Eosinophil % : 0.2 %  Auto Basophil % : 0.0 %    01-19    132[L]  |  97  |  39[H]  ----------------------------<  255[H]  4.5   |  25  |  1.04    Ca    8.5      19 Jan 2025 06:54  Phos  2.8     01-19  Mg     2.3     01-19    TPro  5.6[L]  /  Alb  2.4[L]  /  TBili  0.2  /  DBili  x   /  AST  64[H]  /  ALT  63[H]  /  AlkPhos  90  01-19    Mg 2.3  Phos 2.8    RADIOLOGY & ADDITIONAL STUDIES:  CT Abdomen and Pelvis No Cont (01.17.25 @ 17:48) >  Extensive new bilateral groundglass nodular opacities, upper lobe   predominant. The etiology is unclear, question pneumonia.  Small right andtrace left pleural effusion.  Right hepatic mass is not well evaluated but appears decreased in size   since 12/9/2024. Known splenic mass is poorly seen without contrast.    Confluent retroperitoneal soft tissue mass is unchanged, likely   retroperitoneal fibrosis.

## 2025-01-19 NOTE — PROGRESS NOTE ADULT - PROBLEM SELECTOR PLAN 2
Not neutropenic.  1/17 - F/u BCX  1/17 - F/U CT C/A/P- Extensive new bilateral ground glass nodular opacities, upper lobe predominant. The etiology is unclear, question pneumonia. Small right and trace left pleural effusion. Right hepatic mass is not well evaluated but appears decreased in size  since 12/9/2024. Known splenic mass is poorly seen without contrast.  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  1/17- F/u Flu/COVID PCR , + for Coronavirus   (last hospitalization: osteomyelitis and E coli urosepsis (12/1/24 - 12/18/24)   [facility] (12/18/24): for osteomyelitis & E coli urosepsis).  1/18- Started Zosyn O/N Not neutropenic.  1/17 - F/u BCX  1/17 - F/U CT C/A/P- Extensive new bilateral ground glass nodular opacities, upper lobe predominant. The etiology is unclear, question pneumonia. Small right and trace left pleural effusion. Right hepatic mass is not well evaluated but appears decreased in size  since 12/9/2024. Known splenic mass is poorly seen without contrast.  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  1/17- F/u Flu/COVID PCR , + for Coronavirus   (last hospitalization: osteomyelitis and E coli urosepsis (12/1/24 - 12/18/24)   [facility] (12/18/24): for osteomyelitis & E coli urosepsis).  1/18- Started Zosyn O/N  1/18 - Oral candidiasis - Nystatin S/S

## 2025-01-19 NOTE — PROGRESS NOTE ADULT - ASSESSMENT
COMPARISON: CT chest, abdomen, pelvis 11/29/2024. MRI 12/9/2024    FINDINGS:  CHEST:  LUNGS AND LARGE AIRWAYS: Patent central airways. Bilateral extensive upper lobe predominant groundglass and nodular opacities are new from 11/29/2024. Rounded consolidative opacity in the left lower lobe, similar to prior and likely represents rounded atelectasis.  ABDOMEN AND PELVIS:  LIVER: 4.1 x 3.4 cm hypodense lesion in the right hepatic lobe is slightly decreased in size when compared to MR abdomen pelvis 12/2/2024.  BILE DUCTS: Normal caliber.  GALLBLADDER: Cholecystectomy.  SPLEEN: Splenomegaly. Previously described splenic lesion is not well delineated on this noncontrast CT.  PANCREAS: Within normal limits.  ADRENALS: Within normal limits.  KIDNEYS/URETERS: Status post left nephrectomy. Atrophic native right kidney. Indeterminate hyperdense 1.5 cm right mid renal mass is unchanged. Renal allograft in the right iliac fossa without hydronephrosis. No peritransplant collection.    BOWEL: Moderate distal sigmoid and large rectal stool burden. No bowel obstruction. Appendix is normal.  PERITONEUM/RETROPERITONEUM: Confluent soft tissue in the retroperitoneum, encircling the aorta space, unchanged. No ascites or pneumoperitoneum.  VESSELS: Atherosclerotic changes.  LYMPH NODES: No lymphadenopathy.  ABDOMINAL WALL: Within normal limits.  BONES: Left total hip arthroplasty. Degenerative changes. Erosive changes within the sacrum with age-indeterminate right sacral fracture. Old right eighth and 12th rib fractures.    IMPRESSION:  Extensive new bilateral groundglass nodular opacities, upper lobe predominant. The etiology is unclear, question pneumonia.  Small right and trace left pleural effusion.  Right hepatic mass is not well evaluated but appears decreased in size since 12/9/2024. Known splenic mass is poorly seen without contrast.  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   M 70 y/o M PMHx renal transplant 2012 (2/2 DM, s/p left nephrectomy), peripheral neuropathy, hypothyroidism, retroperitoneal fibrosis, HTN, HLD, GERD, anxiety/depression, ANGELIKA and recent admission at [facility] for sacral osteomyelitis and ESBL.coli urosepsis discharged on 12/18/24 to rehab. While admitted to [facility] was noted to have hypercalcemia and liver masses which were biopsied on 12/16/24, biopsy revealed DLBCL. Patient received R mini CHOP on 12/28 now admitted for cycle #2 Rmini CHOP, upon admission patient is febrile will hold chemotherapy today.

## 2025-01-19 NOTE — PROVIDER CONTACT NOTE (OTHER) - ASSESSMENT
Pt is AOx4, confused at times. Pt vital signs are stable except febrile. Pt is asymptomatic. No distress noted at this time.

## 2025-01-19 NOTE — PROGRESS NOTE ADULT - NS ATTEND AMEND GEN_ALL_CORE FT
.    Primary: [place]    Vital Signs Last 24 Hrs  T(C): 36.3 (19 Jan 2025 00:59), Max: 38.4 (18 Jan 2025 05:28)  T(F): 97.4 (19 Jan 2025 00:59), Max: 101.1 (18 Jan 2025 05:28)  HR: 65 (19 Jan 2025 00:59) (63 - 84)  BP: 119/62 (19 Jan 2025 00:59) (114/57 - 152/71)  BP(mean): --  RR: 17 (19 Jan 2025 00:59) (17 - 20)  SpO2: 99% (19 Jan 2025 00:59) (93% - 99%)    MEDICATIONS  (STANDING):  alteplase for catheter clearance 2 milliGRAM(s) Catheter once  alteplase for catheter clearance 2 milliGRAM(s) Catheter once  amLODIPine   Tablet 10 milliGRAM(s) Oral daily  buPROPion XL (24-Hour) . 300 milliGRAM(s) Oral daily  carvedilol 12.5 milliGRAM(s) Oral every 12 hours  chlorhexidine 4% Liquid 1 Application(s) Topical <User Schedule>  cloNIDine 0.1 milliGRAM(s) Oral three times a day  dextrose 5%. 1000 milliLiter(s) (50 mL/Hr) IV Continuous <Continuous>  dextrose 5%. 1000 milliLiter(s) (100 mL/Hr) IV Continuous <Continuous>  dextrose 50% Injectable 25 Gram(s) IV Push once  dextrose 50% Injectable 12.5 Gram(s) IV Push once  dextrose 50% Injectable 25 Gram(s) IV Push once  enoxaparin Injectable 40 milliGRAM(s) SubCutaneous <User Schedule>  escitalopram 10 milliGRAM(s) Oral daily  glucagon  Injectable 1 milliGRAM(s) IntraMuscular once  glucagon  Injectable 1 milliGRAM(s) IntraMuscular once  hydrALAZINE 100 milliGRAM(s) Oral every 8 hours  insulin glargine Injectable (LANTUS) 38 Unit(s) SubCutaneous at bedtime  insulin lispro (ADMELOG) corrective regimen sliding scale   SubCutaneous three times a day before meals  insulin lispro (ADMELOG) corrective regimen sliding scale   SubCutaneous at bedtime  insulin lispro Injectable (ADMELOG) 8 Unit(s) SubCutaneous before breakfast  insulin lispro Injectable (ADMELOG) 8 Unit(s) SubCutaneous before lunch  insulin lispro Injectable (ADMELOG) 8 Unit(s) SubCutaneous before dinner  lactulose Syrup 15 Gram(s) Oral <User Schedule>  levothyroxine 88 MICROGram(s) Oral daily  lisinopril 10 milliGRAM(s) Oral daily  nystatin    Suspension 270275 Unit(s) Oral every 6 hours  pantoprazole   Suspension 40 milliGRAM(s) Oral daily  piperacillin/tazobactam IVPB.. 3.375 Gram(s) IV Intermittent every 8 hours  polyethylene glycol 3350 17 Gram(s) Oral at bedtime  predniSONE   Tablet 5 milliGRAM(s) Oral two times a day  rosuvastatin 10 milliGRAM(s) Oral at bedtime  senna 2 Tablet(s) Oral at bedtime  tacrolimus 1 milliGRAM(s) Oral <User Schedule>  tacrolimus 1 milliGRAM(s) Oral at bedtime    Assessment: Pier[facility] for day 1 cycle 2 mini RCH(O)P for PTLD but suffered from urosepsis with Klebsiella ESBL (Zosyn sensitive) and nasal corona virus.      PMHx::  1) renal transplant 2012: left nephrectomy; renal transplant was secondary to DM, 2) AODM, 3) HLD, 4) hypothyroidism, 5) peripheral neuropathy  6) retroperitoneal fibrosis, 7) GERD, 8) HTN, 9) anxiety/depression, 10) obstructive sleep apnea, 11) osteomyelitis and E coli urosepsis (12/1/24 - 12/18/24)    wife (Ludmila), daughter (Luz Marina) and son (Jose J).    Plan:  Hold planned chemotherapy. given toxic presentation    ID:  [email or web address] (1/17/25 - ), please check IgG level     Radiographs: chest/abd pelvis (1/17/25): Pended above    Renal: continue /pred -- decrease pred 5mg bid (50% dose reduction) 1/18/25    Over 60 minutes were spent in direct patient care and care coordination.    COMPARISON: CT chest, abdomen, pelvis 11/29/2024. MRI 12/9/2024    FINDINGS:  CHEST:  LUNGS AND LARGE AIRWAYS: Patent central airways. Bilateral extensive upper lobe predominant groundglass and nodular opacities are new from 11/29/2024. Rounded consolidative opacity in the left lower lobe, similar to prior and likely represents rounded atelectasis.  ABDOMEN AND PELVIS:  LIVER: 4.1 x 3.4 cm hypodense lesion in the right hepatic lobe is slightly decreased in size when compared to MR abdomen pelvis 12/2/2024.  BILE DUCTS: Normal caliber.  GALLBLADDER: Cholecystectomy.  SPLEEN: Splenomegaly. Previously described splenic lesion is not well delineated on this noncontrast CT.  PANCREAS: Within normal limits.  ADRENALS: Within normal limits.  KIDNEYS/URETERS: Status post left nephrectomy. Atrophic native right kidney. Indeterminate hyperdense 1.5 cm right mid renal mass is unchanged. Renal allograft in the right iliac fossa without hydronephrosis. No peritransplant collection.    BOWEL: Moderate distal sigmoid and large rectal stool burden. No bowel obstruction. Appendix is normal.  PERITONEUM/RETROPERITONEUM: Confluent soft tissue in the retroperitoneum, encircling the aorta space, unchanged. No ascites or pneumoperitoneum.  VESSELS: Atherosclerotic changes.  LYMPH NODES: No lymphadenopathy.  ABDOMINAL WALL: Within normal limits.  BONES: Left total hip arthroplasty. Degenerative changes. Erosive changes within the sacrum with age-indeterminate right sacral fracture. Old right eighth and 12th rib fractures.    IMPRESSION:  Extensive new bilateral groundglass nodular opacities, upper lobe predominant. The etiology is unclear, question pneumonia.  Small right and trace left pleural effusion.  Right hepatic mass is not well evaluated but appears decreased in size since 12/9/2024. Known splenic mass is poorly seen without contrast.  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 .    Primary: [place]    Vital Signs Last 24 Hrs  T(C): 36.3 (19 Jan 2025 00:59), Max: 38.4 (18 Jan 2025 05:28)  T(F): 97.4 (19 Jan 2025 00:59), Max: 101.1 (18 Jan 2025 05:28)  HR: 65 (19 Jan 2025 00:59) (63 - 84)  BP: 119/62 (19 Jan 2025 00:59) (114/57 - 152/71)  BP(mean): --  RR: 17 (19 Jan 2025 00:59) (17 - 20)  SpO2: 99% (19 Jan 2025 00:59) (93% - 99%)    MEDICATIONS  (STANDING):  alteplase for catheter clearance 2 milliGRAM(s) Catheter once  alteplase for catheter clearance 2 milliGRAM(s) Catheter once  amLODIPine   Tablet 10 milliGRAM(s) Oral daily  buPROPion XL (24-Hour) . 300 milliGRAM(s) Oral daily  carvedilol 12.5 milliGRAM(s) Oral every 12 hours  chlorhexidine 4% Liquid 1 Application(s) Topical <User Schedule>  cloNIDine 0.1 milliGRAM(s) Oral three times a day  dextrose 5%. 1000 milliLiter(s) (50 mL/Hr) IV Continuous <Continuous>  dextrose 5%. 1000 milliLiter(s) (100 mL/Hr) IV Continuous <Continuous>  dextrose 50% Injectable 25 Gram(s) IV Push once  dextrose 50% Injectable 12.5 Gram(s) IV Push once  dextrose 50% Injectable 25 Gram(s) IV Push once  enoxaparin Injectable 40 milliGRAM(s) SubCutaneous <User Schedule>  escitalopram 10 milliGRAM(s) Oral daily  glucagon  Injectable 1 milliGRAM(s) IntraMuscular once  glucagon  Injectable 1 milliGRAM(s) IntraMuscular once  hydrALAZINE 100 milliGRAM(s) Oral every 8 hours  insulin glargine Injectable (LANTUS) 38 Unit(s) SubCutaneous at bedtime  insulin lispro (ADMELOG) corrective regimen sliding scale   SubCutaneous three times a day before meals  insulin lispro (ADMELOG) corrective regimen sliding scale   SubCutaneous at bedtime  insulin lispro Injectable (ADMELOG) 8 Unit(s) SubCutaneous before breakfast  insulin lispro Injectable (ADMELOG) 8 Unit(s) SubCutaneous before lunch  insulin lispro Injectable (ADMELOG) 8 Unit(s) SubCutaneous before dinner  lactulose Syrup 15 Gram(s) Oral <User Schedule>  levothyroxine 88 MICROGram(s) Oral daily  lisinopril 10 milliGRAM(s) Oral daily  nystatin    Suspension 365033 Unit(s) Oral every 6 hours  pantoprazole   Suspension 40 milliGRAM(s) Oral daily  piperacillin/tazobactam IVPB.. 3.375 Gram(s) IV Intermittent every 8 hours  polyethylene glycol 3350 17 Gram(s) Oral at bedtime  predniSONE   Tablet 5 milliGRAM(s) Oral two times a day  rosuvastatin 10 milliGRAM(s) Oral at bedtime  senna 2 Tablet(s) Oral at bedtime  tacrolimus 1 milliGRAM(s) Oral <User Schedule>  tacrolimus 1 milliGRAM(s) Oral at bedtime    Assessment: [facility] day 1 cycle 2 mini R[facility](O)P for PTLD but suffered from urosepsis with Klebsiella ESBL (Zosyn sensitive) and nasal corona virus.      PMHx::  1) renal transplant 2012: left nephrectomy; renal transplant was secondary to DM, 2) AODM, 3) HLD, 4) hypothyroidism, 5) peripheral neuropathy  6) retroperitoneal fibrosis, 7) GERD, 8) HTN, 9) anxiety/depression, 10) obstructive sleep apnea, 11) osteomyelitis and E coli urosepsis (12/1/24 - 12/18/24)    wife (Ludmila), daughter (Luz Marina) and son (Jose J).    Plan:  Hold planned chemotherapy. given toxic presentation; unlikely to continue anthracycline-based treatment.     ID:  [email or web address] (1/17/25 - ), nystatin S&S; please check IgG level     Radiographs: chest/abd pelvis (1/17/25): Pended below    Renal: continue /pred -- decrease pred 5mg bid (50% dose reduction) 1/18/25    Over 60 minutes were spent in direct patient care and care coordination.    COMPARISON: CT chest, abdomen, pelvis 11/29/2024. MRI 12/9/2024    FINDINGS:  CHEST:  LUNGS AND LARGE AIRWAYS: Patent central airways. Bilateral extensive upper lobe predominant groundglass and nodular opacities are new from 11/29/2024. Rounded consolidative opacity in the left lower lobe, similar to prior and likely represents rounded atelectasis.  ABDOMEN AND PELVIS:  LIVER: 4.1 x 3.4 cm hypodense lesion in the right hepatic lobe is slightly decreased in size when compared to MR abdomen pelvis 12/2/2024.  BILE DUCTS: Normal caliber.  GALLBLADDER: Cholecystectomy.  SPLEEN: Splenomegaly. Previously described splenic lesion is not well delineated on this noncontrast CT.  PANCREAS: Within normal limits.  ADRENALS: Within normal limits.  KIDNEYS/URETERS: Status post left nephrectomy. Atrophic native right kidney. Indeterminate hyperdense 1.5 cm right mid renal mass is unchanged. Renal allograft in the right iliac fossa without hydronephrosis. No peritransplant collection.    BOWEL: Moderate distal sigmoid and large rectal stool burden. No bowel obstruction. Appendix is normal.  PERITONEUM/RETROPERITONEUM: Confluent soft tissue in the retroperitoneum, encircling the aorta space, unchanged. No ascites or pneumoperitoneum.  VESSELS: Atherosclerotic changes.  LYMPH NODES: No lymphadenopathy.  ABDOMINAL WALL: Within normal limits.  BONES: Left total hip arthroplasty. Degenerative changes. Erosive changes within the sacrum with age-indeterminate right sacral fracture. Old right eighth and 12th rib fractures.    IMPRESSION:  Extensive new bilateral groundglass nodular opacities, upper lobe predominant. The etiology is unclear, question pneumonia.  Small right and trace left pleural effusion.  Right hepatic mass is not well evaluated but appears decreased in size since 12/9/2024. Known splenic mass is poorly seen without contrast.  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

## 2025-01-19 NOTE — PROGRESS NOTE ADULT - PROBLEM SELECTOR PLAN 3
1/18 - hyperglycemic O/N, will add premeal insulin today, lower Prednisone to 5 mg BID  Monitor FS AC/HS  Continue Lantus 38 units at HS.  Sliding scale Humalog AC/HS.

## 2025-01-19 NOTE — PROGRESS NOTE ADULT - PROBLEM SELECTOR PLAN 1
Admitted  for R mini CHOP, found to be febrile on admission hold  chemo today.  Monitor CBC with diff, transfuse as needed.  Monitor electrolytes, replete as needed.  Daily weights.  Strict I/O.  1/18- CT C/A/P- shows moderate stool burden, bowel regimen. Admitted  for R mini CHOP, found to be febrile on admission hold  chemo today.  Monitor CBC with diff, transfuse as needed.  Monitor electrolytes, replete as needed.  Daily weights.  Strict I/O.

## 2025-01-19 NOTE — PROGRESS NOTE ADULT - NUTRITIONAL ASSESSMENT
This patient has been assessed with a concern for Malnutrition and has been determined to have a diagnosis/diagnoses of Moderate protein-calorie malnutrition.    This patient is being managed with:   Diet Consistent Carbohydrate/No Snacks-  Entered: Jan 17 2025  1:22PM

## 2025-01-20 LAB
ADD ON TEST-SPECIMEN IN LAB: SIGNIFICANT CHANGE UP
ALBUMIN SERPL ELPH-MCNC: 2.6 G/DL — LOW (ref 3.3–5)
ALP SERPL-CCNC: 95 U/L — SIGNIFICANT CHANGE UP (ref 40–120)
ALT FLD-CCNC: 62 U/L — HIGH (ref 10–45)
ANION GAP SERPL CALC-SCNC: 10 MMOL/L — SIGNIFICANT CHANGE UP (ref 5–17)
AST SERPL-CCNC: 51 U/L — HIGH (ref 10–40)
BASOPHILS # BLD AUTO: 0.03 K/UL — SIGNIFICANT CHANGE UP (ref 0–0.2)
BASOPHILS NFR BLD AUTO: 0.4 % — SIGNIFICANT CHANGE UP (ref 0–2)
BILIRUB SERPL-MCNC: 0.2 MG/DL — SIGNIFICANT CHANGE UP (ref 0.2–1.2)
BLD GP AB SCN SERPL QL: NEGATIVE — SIGNIFICANT CHANGE UP
BUN SERPL-MCNC: 32 MG/DL — HIGH (ref 7–23)
CALCIUM SERPL-MCNC: 8.9 MG/DL — SIGNIFICANT CHANGE UP (ref 8.4–10.5)
CHLORIDE SERPL-SCNC: 101 MMOL/L — SIGNIFICANT CHANGE UP (ref 96–108)
CO2 SERPL-SCNC: 23 MMOL/L — SIGNIFICANT CHANGE UP (ref 22–31)
CREAT SERPL-MCNC: 1.15 MG/DL — SIGNIFICANT CHANGE UP (ref 0.5–1.3)
CULTURE RESULTS: ABNORMAL
EGFR: 70 ML/MIN/1.73M2 — SIGNIFICANT CHANGE UP
EOSINOPHIL # BLD AUTO: 0.03 K/UL — SIGNIFICANT CHANGE UP (ref 0–0.5)
EOSINOPHIL NFR BLD AUTO: 0.4 % — SIGNIFICANT CHANGE UP (ref 0–6)
GLUCOSE BLDC GLUCOMTR-MCNC: 134 MG/DL — HIGH (ref 70–99)
GLUCOSE BLDC GLUCOMTR-MCNC: 149 MG/DL — HIGH (ref 70–99)
GLUCOSE BLDC GLUCOMTR-MCNC: 150 MG/DL — HIGH (ref 70–99)
GLUCOSE BLDC GLUCOMTR-MCNC: 72 MG/DL — SIGNIFICANT CHANGE UP (ref 70–99)
GLUCOSE SERPL-MCNC: 52 MG/DL — CRITICAL LOW (ref 70–99)
HCT VFR BLD CALC: 23.9 % — LOW (ref 39–50)
HGB BLD-MCNC: 7.5 G/DL — LOW (ref 13–17)
IMM GRANULOCYTES NFR BLD AUTO: 1.9 % — HIGH (ref 0–0.9)
LYMPHOCYTES # BLD AUTO: 0.18 K/UL — LOW (ref 1–3.3)
LYMPHOCYTES # BLD AUTO: 2.5 % — LOW (ref 13–44)
MAGNESIUM SERPL-MCNC: 2 MG/DL — SIGNIFICANT CHANGE UP (ref 1.6–2.6)
MCHC RBC-ENTMCNC: 29.1 PG — SIGNIFICANT CHANGE UP (ref 27–34)
MCHC RBC-ENTMCNC: 31.4 G/DL — LOW (ref 32–36)
MCV RBC AUTO: 92.6 FL — SIGNIFICANT CHANGE UP (ref 80–100)
MONOCYTES # BLD AUTO: 0.35 K/UL — SIGNIFICANT CHANGE UP (ref 0–0.9)
MONOCYTES NFR BLD AUTO: 4.8 % — SIGNIFICANT CHANGE UP (ref 2–14)
NEUTROPHILS # BLD AUTO: 6.58 K/UL — SIGNIFICANT CHANGE UP (ref 1.8–7.4)
NEUTROPHILS NFR BLD AUTO: 90 % — HIGH (ref 43–77)
NRBC # BLD: 0 /100 WBCS — SIGNIFICANT CHANGE UP (ref 0–0)
NRBC BLD-RTO: 0 /100 WBCS — SIGNIFICANT CHANGE UP (ref 0–0)
NT-PROBNP SERPL-SCNC: 520 PG/ML — HIGH (ref 0–300)
PHOSPHATE SERPL-MCNC: 2 MG/DL — LOW (ref 2.5–4.5)
PLATELET # BLD AUTO: 485 K/UL — HIGH (ref 150–400)
POTASSIUM SERPL-MCNC: 4.8 MMOL/L — SIGNIFICANT CHANGE UP (ref 3.5–5.3)
POTASSIUM SERPL-SCNC: 4.8 MMOL/L — SIGNIFICANT CHANGE UP (ref 3.5–5.3)
PROCALCITONIN SERPL-MCNC: 0.52 NG/ML — HIGH (ref 0.02–0.1)
PROT SERPL-MCNC: 5.7 G/DL — LOW (ref 6–8.3)
RBC # BLD: 2.58 M/UL — LOW (ref 4.2–5.8)
RBC # FLD: 19.6 % — HIGH (ref 10.3–14.5)
RH IG SCN BLD-IMP: POSITIVE — SIGNIFICANT CHANGE UP
SODIUM SERPL-SCNC: 134 MMOL/L — LOW (ref 135–145)
SPECIMEN SOURCE: SIGNIFICANT CHANGE UP
WBC # BLD: 7.31 K/UL — SIGNIFICANT CHANGE UP (ref 3.8–10.5)
WBC # FLD AUTO: 7.31 K/UL — SIGNIFICANT CHANGE UP (ref 3.8–10.5)

## 2025-01-20 PROCEDURE — 99233 SBSQ HOSP IP/OBS HIGH 50: CPT | Mod: FS

## 2025-01-20 RX ORDER — ERTAPENEM SODIUM 1 G/1
INJECTION, POWDER, LYOPHILIZED, FOR SOLUTION INTRAMUSCULAR; INTRAVENOUS
Refills: 0 | Status: DISCONTINUED | OUTPATIENT
Start: 2025-01-20 | End: 2025-01-24

## 2025-01-20 RX ORDER — ERTAPENEM SODIUM 1 G/1
1000 INJECTION, POWDER, LYOPHILIZED, FOR SOLUTION INTRAMUSCULAR; INTRAVENOUS EVERY 24 HOURS
Refills: 0 | Status: DISCONTINUED | OUTPATIENT
Start: 2025-01-21 | End: 2025-01-24

## 2025-01-20 RX ORDER — ERTAPENEM SODIUM 1 G/1
1000 INJECTION, POWDER, LYOPHILIZED, FOR SOLUTION INTRAMUSCULAR; INTRAVENOUS ONCE
Refills: 0 | Status: COMPLETED | OUTPATIENT
Start: 2025-01-20 | End: 2025-01-20

## 2025-01-20 RX ORDER — POTASSIUM PHOSPHATE, MONOBASIC POTASSIUM PHOSPHATE, DIBASIC 236; 224 MG/ML; MG/ML
15 INJECTION, SOLUTION INTRAVENOUS ONCE
Refills: 0 | Status: COMPLETED | OUTPATIENT
Start: 2025-01-20 | End: 2025-01-20

## 2025-01-20 RX ADMIN — Medication 10 MILLIGRAM(S): at 05:03

## 2025-01-20 RX ADMIN — ACETAMINOPHEN 650 MILLIGRAM(S): 160 SUSPENSION ORAL at 18:41

## 2025-01-20 RX ADMIN — CLONIDINE HYDROCHLORIDE 0.1 MILLIGRAM(S): 0.2 TABLET ORAL at 14:25

## 2025-01-20 RX ADMIN — PANTOPRAZOLE 40 MILLIGRAM(S): 20 TABLET, DELAYED RELEASE ORAL at 13:14

## 2025-01-20 RX ADMIN — Medication 8 UNIT(S): at 18:42

## 2025-01-20 RX ADMIN — PREDNISONE 5 MILLIGRAM(S): 5 TABLET ORAL at 18:41

## 2025-01-20 RX ADMIN — Medication 10 MILLIGRAM(S): at 05:04

## 2025-01-20 RX ADMIN — ANTISEPTIC SURGICAL SCRUB 1 APPLICATION(S): 0.04 SOLUTION TOPICAL at 14:25

## 2025-01-20 RX ADMIN — Medication 10 GRAM(S): at 13:15

## 2025-01-20 RX ADMIN — Medication 100 MILLIGRAM(S): at 21:31

## 2025-01-20 RX ADMIN — INSULIN GLARGINE-YFGN 38 UNIT(S): 100 INJECTION, SOLUTION SUBCUTANEOUS at 22:36

## 2025-01-20 RX ADMIN — Medication 15 GRAM(S): at 21:27

## 2025-01-20 RX ADMIN — Medication 8 UNIT(S): at 13:13

## 2025-01-20 RX ADMIN — ESCITALOPRAM 10 MILLIGRAM(S): 10 TABLET, FILM COATED ORAL at 13:14

## 2025-01-20 RX ADMIN — PREDNISONE 5 MILLIGRAM(S): 5 TABLET ORAL at 05:04

## 2025-01-20 RX ADMIN — Medication 8 UNIT(S): at 08:53

## 2025-01-20 RX ADMIN — ACETAMINOPHEN 650 MILLIGRAM(S): 160 SUSPENSION ORAL at 00:00

## 2025-01-20 RX ADMIN — CLONIDINE HYDROCHLORIDE 0.1 MILLIGRAM(S): 0.2 TABLET ORAL at 21:30

## 2025-01-20 RX ADMIN — ROSUVASTATIN CALCIUM 10 MILLIGRAM(S): 10 TABLET, FILM COATED ORAL at 21:27

## 2025-01-20 RX ADMIN — Medication 100 MILLIGRAM(S): at 14:25

## 2025-01-20 RX ADMIN — Medication 500000 UNIT(S): at 05:03

## 2025-01-20 RX ADMIN — ENOXAPARIN SODIUM 40 MILLIGRAM(S): 100 INJECTION SUBCUTANEOUS at 21:26

## 2025-01-20 RX ADMIN — Medication 500000 UNIT(S): at 13:14

## 2025-01-20 RX ADMIN — CLONIDINE HYDROCHLORIDE 0.1 MILLIGRAM(S): 0.2 TABLET ORAL at 05:04

## 2025-01-20 RX ADMIN — BUPROPION HYDROCHLORIDE 300 MILLIGRAM(S): 150 TABLET, EXTENDED RELEASE ORAL at 13:14

## 2025-01-20 RX ADMIN — POTASSIUM PHOSPHATE, MONOBASIC POTASSIUM PHOSPHATE, DIBASIC 62.5 MILLIMOLE(S): 236; 224 INJECTION, SOLUTION INTRAVENOUS at 16:33

## 2025-01-20 RX ADMIN — Medication 500000 UNIT(S): at 18:41

## 2025-01-20 RX ADMIN — TACROLIMUS 1 MILLIGRAM(S): 1 CAPSULE, GELATIN COATED ORAL at 21:27

## 2025-01-20 RX ADMIN — TACROLIMUS 1 MILLIGRAM(S): 1 CAPSULE, GELATIN COATED ORAL at 08:54

## 2025-01-20 RX ADMIN — Medication 12.5 MILLIGRAM(S): at 05:03

## 2025-01-20 RX ADMIN — Medication 10 GRAM(S): at 05:04

## 2025-01-20 RX ADMIN — PIPERACILLIN SODIUM AND TAZOBACTAM SODIUM 25 GRAM(S): 2; 250 INJECTION, POWDER, FOR SOLUTION INTRAVENOUS at 05:03

## 2025-01-20 RX ADMIN — Medication 100 MILLIGRAM(S): at 05:03

## 2025-01-20 RX ADMIN — ERTAPENEM SODIUM 120 MILLIGRAM(S): 1 INJECTION, POWDER, LYOPHILIZED, FOR SOLUTION INTRAMUSCULAR; INTRAVENOUS at 22:15

## 2025-01-20 RX ADMIN — Medication 12.5 MILLIGRAM(S): at 18:41

## 2025-01-20 RX ADMIN — LEVOTHYROXINE SODIUM 88 MICROGRAM(S): 25 TABLET ORAL at 05:04

## 2025-01-20 RX ADMIN — Medication 2 TABLET(S): at 21:27

## 2025-01-20 RX ADMIN — POLYETHYLENE GLYCOL 3350 17 GRAM(S): 17 POWDER, FOR SOLUTION ORAL at 21:25

## 2025-01-20 RX ADMIN — PIPERACILLIN SODIUM AND TAZOBACTAM SODIUM 25 GRAM(S): 2; 250 INJECTION, POWDER, FOR SOLUTION INTRAVENOUS at 14:25

## 2025-01-20 NOTE — PROGRESS NOTE ADULT - PROBLEM SELECTOR PLAN 5
Continue Tacrolimus 1mg in am and 1 mg at HS.  Pred 5mg bid (50% dose reduction) 1/18/25  AT RISK FOR ADRENAL INSUFFIENCEY IF HYPOTENSIVE 50mg hydrocortisone IV q8

## 2025-01-20 NOTE — PROGRESS NOTE ADULT - NS ATTEND AMEND GEN_ALL_CORE FT
.    Primary: [place]    Vital Signs Last 24 Hrs  T(C): 37.1 (20 Jan 2025 04:58), Max: 38.9 (19 Jan 2025 23:22)  T(F): 98.7 (20 Jan 2025 04:58), Max: 102 (19 Jan 2025 23:22)  HR: 72 (20 Jan 2025 04:58) (66 - 96)  BP: 142/68 (20 Jan 2025 04:58) (124/66 - 160/70)  BP(mean): --  RR: 18 (20 Jan 2025 04:58) (18 - 20)  SpO2: 93% (20 Jan 2025 04:58) (92% - 98%)    Parameters below as of 20 Jan 2025 04:58  Patient On (Oxygen Delivery Method): room air    MEDICATIONS  (STANDING):  alteplase for catheter clearance 2 milliGRAM(s) Catheter once  alteplase for catheter clearance 2 milliGRAM(s) Catheter once  amLODIPine   Tablet 10 milliGRAM(s) Oral daily  buPROPion XL (24-Hour) . 300 milliGRAM(s) Oral daily  carvedilol 12.5 milliGRAM(s) Oral every 12 hours  chlorhexidine 4% Liquid 1 Application(s) Topical <User Schedule>  cloNIDine 0.1 milliGRAM(s) Oral three times a day  dextrose 5%. 1000 milliLiter(s) (50 mL/Hr) IV Continuous <Continuous>  dextrose 5%. 1000 milliLiter(s) (100 mL/Hr) IV Continuous <Continuous>  dextrose 50% Injectable 25 Gram(s) IV Push once  dextrose 50% Injectable 12.5 Gram(s) IV Push once  dextrose 50% Injectable 25 Gram(s) IV Push once  enoxaparin Injectable 40 milliGRAM(s) SubCutaneous <User Schedule>  escitalopram 10 milliGRAM(s) Oral daily  glucagon  Injectable 1 milliGRAM(s) IntraMuscular once  glucagon  Injectable 1 milliGRAM(s) IntraMuscular once  hydrALAZINE 100 milliGRAM(s) Oral every 8 hours  insulin glargine Injectable (LANTUS) 38 Unit(s) SubCutaneous at bedtime  insulin lispro (ADMELOG) corrective regimen sliding scale   SubCutaneous three times a day before meals  insulin lispro (ADMELOG) corrective regimen sliding scale   SubCutaneous at bedtime  insulin lispro Injectable (ADMELOG) 8 Unit(s) SubCutaneous before breakfast  insulin lispro Injectable (ADMELOG) 8 Unit(s) SubCutaneous before lunch  insulin lispro Injectable (ADMELOG) 8 Unit(s) SubCutaneous before dinner  lactulose Syrup 15 Gram(s) Oral <User Schedule>  lactulose Syrup 10 Gram(s) Oral every 6 hours  levothyroxine 88 MICROGram(s) Oral daily  lisinopril 10 milliGRAM(s) Oral daily  nystatin    Suspension 289477 Unit(s) Oral every 6 hours  pantoprazole   Suspension 40 milliGRAM(s) Oral daily  piperacillin/tazobactam IVPB.. 3.375 Gram(s) IV Intermittent every 8 hours  polyethylene glycol 3350 17 Gram(s) Oral at bedtime  predniSONE   Tablet 5 milliGRAM(s) Oral two times a day  rosuvastatin 10 milliGRAM(s) Oral at bedtime  senna 2 Tablet(s) Oral at bedtime  tacrolimus 1 milliGRAM(s) Oral <User Schedule>  tacrolimus 1 milliGRAM(s) Oral at bedtime      Assessment: Piero was [facility] for day 1 cycle 2 mini RCH(O)P for PTLD but suffered from urosepsis with Klebsiella ESBL (Zosyn sensitive) and nasal pinzon virus.  wife (Ludmila), daughter (Luz Marina) and son (Jose J).    PMHx::  1) renal transplant 2012: left nephrectomy; renal transplant was secondary to DM, 2) AODM, 3) HLD, 4) hypothyroidism, 5) peripheral neuropathy  6) retroperitoneal fibrosis, 7) GERD, 8) HTN, 9) anxiety/depression, 10) obstructive sleep apnea, 11) osteomyelitis and E coli urosepsis (12/1/24 - 12/18/24)    Plan:  Hold planned chemotherapy. given toxic presentation and sustained high fevers unlikely to continue anthracycline-based treatment.     ID:  [email or web address] (1/17/25 - ), nystatin S&S; IgG level: pending; given high fevers concerned for continued osteo - please obtain ID consult     Radiographs: chest/abd pelvis (1/17/25): Pended below    Renal: continue /pred -- pred 5mg bid (50% dose reduction) 1/18/25  AT RISK FOR ADRENAL INSUFFIENCEY IF HYPOTENSIVE 50mg hydrocortisone IV q8     Over 60 minutes were spent in direct patient care and care coordination.    COMPARISON: CT chest, abdomen, pelvis 11/29/2024. MRI 12/9/2024    FINDINGS:  CHEST:  LUNGS AND LARGE AIRWAYS: Patent central airways. Bilateral extensive upper lobe predominant groundglass and nodular opacities are new from 11/29/2024. Rounded consolidative opacity in the left lower lobe, similar to prior and likely represents rounded atelectasis.  ABDOMEN AND PELVIS:  LIVER: 4.1 x 3.4 cm hypodense lesion in the right hepatic lobe is slightly decreased in size when compared to MR abdomen pelvis 12/2/2024.  BILE DUCTS: Normal caliber.  GALLBLADDER: Cholecystectomy.  SPLEEN: Splenomegaly. Previously described splenic lesion is not well delineated on this noncontrast CT.  PANCREAS: Within normal limits.  ADRENALS: Within normal limits.  KIDNEYS/URETERS: Status post left nephrectomy. Atrophic native right kidney. Indeterminate hyperdense 1.5 cm right mid renal mass is unchanged. Renal allograft in the right iliac fossa without hydronephrosis. No peritransplant collection.    BOWEL: Moderate distal sigmoid and large rectal stool burden. No bowel obstruction. Appendix is normal.  PERITONEUM/RETROPERITONEUM: Confluent soft tissue in the retroperitoneum, encircling the aorta space, unchanged. No ascites or pneumoperitoneum.  VESSELS: Atherosclerotic changes.  LYMPH NODES: No lymphadenopathy.  ABDOMINAL WALL: Within normal limits.  BONES: Left total hip arthroplasty. Degenerative changes. Erosive changes within the sacrum with age-indeterminate right sacral fracture. Old right eighth and 12th rib fractures.    IMPRESSION:  Extensive new bilateral groundglass nodular opacities, upper lobe predominant. The etiology is unclear, question pneumonia.  Small right and trace left pleural effusion.  Right hepatic mass is not well evaluated but appears decreased in size since 12/9/2024. Known splenic mass is poorly seen without contrast.  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 .  Primary: [place]    Vital Signs Last 24 Hrs  T(C): 37.1 (20 Jan 2025 04:58), Max: 38.9 (19 Jan 2025 23:22)  T(F): 98.7 (20 Jan 2025 04:58), Max: 102 (19 Jan 2025 23:22)  HR: 72 (20 Jan 2025 04:58) (66 - 96)  BP: 142/68 (20 Jan 2025 04:58) (124/66 - 160/70)  BP(mean): --  RR: 18 (20 Jan 2025 04:58) (18 - 20)  SpO2: 93% (20 Jan 2025 04:58) (92% - 98%)    Parameters below as of 20 Jan 2025 04:58  Patient On (Oxygen Delivery Method): room air    MEDICATIONS  (STANDING):  alteplase for catheter clearance 2 milliGRAM(s) Catheter once  alteplase for catheter clearance 2 milliGRAM(s) Catheter once  amLODIPine   Tablet 10 milliGRAM(s) Oral daily  buPROPion XL (24-Hour) . 300 milliGRAM(s) Oral daily  carvedilol 12.5 milliGRAM(s) Oral every 12 hours  chlorhexidine 4% Liquid 1 Application(s) Topical <User Schedule>  cloNIDine 0.1 milliGRAM(s) Oral three times a day  dextrose 5%. 1000 milliLiter(s) (50 mL/Hr) IV Continuous <Continuous>  dextrose 5%. 1000 milliLiter(s) (100 mL/Hr) IV Continuous <Continuous>  dextrose 50% Injectable 25 Gram(s) IV Push once  dextrose 50% Injectable 12.5 Gram(s) IV Push once  dextrose 50% Injectable 25 Gram(s) IV Push once  enoxaparin Injectable 40 milliGRAM(s) SubCutaneous <User Schedule>  escitalopram 10 milliGRAM(s) Oral daily  glucagon  Injectable 1 milliGRAM(s) IntraMuscular once  glucagon  Injectable 1 milliGRAM(s) IntraMuscular once  hydrALAZINE 100 milliGRAM(s) Oral every 8 hours  insulin glargine Injectable (LANTUS) 38 Unit(s) SubCutaneous at bedtime  insulin lispro (ADMELOG) corrective regimen sliding scale   SubCutaneous three times a day before meals  insulin lispro (ADMELOG) corrective regimen sliding scale   SubCutaneous at bedtime  insulin lispro Injectable (ADMELOG) 8 Unit(s) SubCutaneous before breakfast  insulin lispro Injectable (ADMELOG) 8 Unit(s) SubCutaneous before lunch  insulin lispro Injectable (ADMELOG) 8 Unit(s) SubCutaneous before dinner  lactulose Syrup 15 Gram(s) Oral <User Schedule>  lactulose Syrup 10 Gram(s) Oral every 6 hours  levothyroxine 88 MICROGram(s) Oral daily  lisinopril 10 milliGRAM(s) Oral daily  nystatin    Suspension 401594 Unit(s) Oral every 6 hours  pantoprazole   Suspension 40 milliGRAM(s) Oral daily  piperacillin/tazobactam IVPB.. 3.375 Gram(s) IV Intermittent every 8 hours  polyethylene glycol 3350 17 Gram(s) Oral at bedtime  predniSONE   Tablet 5 milliGRAM(s) Oral two times a day  rosuvastatin 10 milliGRAM(s) Oral at bedtime  senna 2 Tablet(s) Oral at bedtime  tacrolimus 1 milliGRAM(s) Oral <User Schedule>  tacrolimus 1 milliGRAM(s) Oral at bedtime    Assessment: [facility] day 1 cycle 2 mini RCH(O)P for PTLD but suffered from urosepsis with Klebsiella ESBL (Zosyn sensitive) and nasal pinzon virus.  wife (Ludmila), daughter (Luz Marina) and son (Jose J).    PMHx::  1) renal transplant 2012: left nephrectomy; renal transplant was secondary to DM, 2) AODM, 3) HLD, 4) hypothyroidism, 5) peripheral neuropathy  6) retroperitoneal fibrosis, 7) GERD, 8) HTN, 9) anxiety/depression, 10) obstructive sleep apnea, 11) osteomyelitis and E coli urosepsis (12/1/24 - 12/18/24)    Plan:  Hold planned chemotherapy. given toxic presentation and sustained high fevers unlikely to continue anthracycline-based treatment.     ID:  zosyn (1/17/25 - ), nystatin S&S; IgG level: pending; given high fevers concerned for continued osteo - please obtain ID consult; despite fever marked improvement in sensorium     Radiographs: chest/abd pelvis (1/17/25): Pended below    Renal: continue /pred -- pred 5mg bid (50% dose reduction) 1/18/25  AT RISK FOR ADRENAL INSUFFIENCEY IF HYPOTENSIVE 50mg hydrocortisone IV q8     Please obtain: PM&R consult - unclear etiology of his poor ECOG PS.    Over 60 minutes were spent in direct patient care and care coordination.    COMPARISON: CT chest, abdomen, pelvis 11/29/2024. MRI 12/9/2024    FINDINGS:  CHEST:  LUNGS AND LARGE AIRWAYS: Patent central airways. Bilateral extensive upper lobe predominant groundglass and nodular opacities are new from 11/29/2024. Rounded consolidative opacity in the left lower lobe, similar to prior and likely represents rounded atelectasis.  ABDOMEN AND PELVIS:  LIVER: 4.1 x 3.4 cm hypodense lesion in the right hepatic lobe is slightly decreased in size when compared to MR abdomen pelvis 12/2/2024.  BILE DUCTS: Normal caliber.  GALLBLADDER: Cholecystectomy.  SPLEEN: Splenomegaly. Previously described splenic lesion is not well delineated on this noncontrast CT.  PANCREAS: Within normal limits.  ADRENALS: Within normal limits.  KIDNEYS/URETERS: Status post left nephrectomy. Atrophic native right kidney. Indeterminate hyperdense 1.5 cm right mid renal mass is unchanged. Renal allograft in the right iliac fossa without hydronephrosis. No peritransplant collection.    BOWEL: Moderate distal sigmoid and large rectal stool burden. No bowel obstruction. Appendix is normal.  PERITONEUM/RETROPERITONEUM: Confluent soft tissue in the retroperitoneum, encircling the aorta space, unchanged. No ascites or pneumoperitoneum.  VESSELS: Atherosclerotic changes.  LYMPH NODES: No lymphadenopathy.  ABDOMINAL WALL: Within normal limits.  BONES: Left total hip arthroplasty. Degenerative changes. Erosive changes within the sacrum with age-indeterminate right sacral fracture. Old right eighth and 12th rib fractures.    IMPRESSION:  Extensive new bilateral groundglass nodular opacities, upper lobe predominant. The etiology is unclear, question pneumonia.  Small right and trace left pleural effusion.  Right hepatic mass is not well evaluated but appears decreased in size since 12/9/2024. Known splenic mass is poorly seen without contrast.  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

## 2025-01-20 NOTE — CONSULT NOTE ADULT - SUBJECTIVE AND OBJECTIVE BOX
[facility]  WANG Mccoy S. Shah, Y. Patel, G. [facility]  [phone]  ([phone] - weekdays after 5pm and weekends)    JAN CALVIN  69y, Male  [id number]    HPI:  Patient is a 69 year old male with PMH of renal transplant 2012 (2/2 DM, s/p left nephrectomy), peripheral neuropathy, hypothyroidism, retroperitoneal fibrosis, HTN, HLD, GERD, anxiety/depression, ANGELIKA and recent admission at [facility] for sacral osteomyelitis and ESBL E.coli urosepsis discharged on 12/18/24 to rehab, recently diagnosed DLBCL while admitted to [place] when he was noted to have hypercalcemia and liver masses s/p biopsu 12/16/24, now s/p R mini CHOP on 12/28 who was admitted 1/17 for cycle #2 Rmini CHOP, upon admission patient is febrile will hold chemotherapy now. Patient resting comfortably, states he has been feeling hot and cold with the fevers, no rigors reported. He denies headache, rhinorrhea, sore throat, cough, chest pain, abdominal pain, nausea, vomiting, diarrhea, dysuria, hesitancy, rashes or any other complaints.   ROS: 14 point review of systems completed, pertinent positives and negatives as per HPI.    Allergies: No Known Allergies    PMH -- Diabetes Mellitus Type II  ESRD on Dialysis  GERD (gastroesophageal reflux disease)  Depression  Hypothyroidism  Hyperlipidemia  Kidney transplanted  Hypertension  ANGELIKA (obstructive sleep apnea)  Peripheral neuropathy  Shoulder fracture  Hypothyroidism  History of renal transplant  DLBCL (diffuse large B cell lymphoma)  Infectious disease  Type 2 diabetes mellitus  Hypothyroidism  Transplanted kidney    PSH -- History of colonoscopy  S/P kidney transplant  S/P cholecystectomy  Retroperitoneal fibrosis  AV fistula  S/P right cataract extraction  S/P left cataract extraction  H/O unilateral nephrectomy    FH -- MI (myocardial infarction)  Family history of obesity (Sibling)    Social History -- denies tobacco, alcohol or illicit drug use    Physical Exam--  Vital Signs Last 24 Hrs  T(F): 99.4 (20 Jan 2025 13:00), Max: 102 (19 Jan 2025 23:22)  HR: 72 (20 Jan 2025 13:00) (72 - 96)  BP: 127/68 (20 Jan 2025 13:00) (123/60 - 160/70)  RR: 18 (20 Jan 2025 13:00) (18 - 20)  SpO2: 93% (20 Jan 2025 13:00) (92% - 93%)  General: no acute distress  HEENT: NC/AT, anicteric, poor dentition  Lungs: clear to auscultation bilaterally  Heart: S1, S2 present, normal rate  Abdomen: Soft, nontender, nondistended    Neuro: AAOx3, no obvious focal deficits   Extremities: No cyanosis. No edema.   Skin: Warm. Dry. No visible rash.   Lines: LUE PICC with no erythema/TTP    Laboratory & Imaging Data--  CBC:                       7.5    7.31  )-----------( 485      ( 20 Jan 2025 06:57 )             23.9     WBC Count: 7.31 K/uL (01-20-25 @ 06:57)  WBC Count: 6.55 K/uL (01-19-25 @ 06:51)  WBC Count: 7.68 K/uL (01-18-25 @ 07:02)  WBC Count: 6.45 K/uL (01-17-25 @ 12:55)    CMP: 01-20    134[L]  |  101  |  32[H]  ----------------------------<  52[LL]  4.8   |  23  |  1.15    Ca    8.9      20 Jan 2025 06:57  Phos  2.0     01-20  Mg     2.0     01-20    TPro  5.7[L]  /  Alb  2.6[L]  /  TBili  0.2  /  DBili  x   /  AST  51[H]  /  ALT  62[H]  /  AlkPhos  95  01-20    LIVER FUNCTIONS - ( 20 Jan 2025 06:57 )  Alb: 2.6 g/dL / Pro: 5.7 g/dL / ALK PHOS: 95 U/L / ALT: 62 U/L / AST: 51 U/L / GGT: x           Urinalysis (01.17.25 @ 17:09)    Blood, Urine: Negative   Glucose Qualitative, Urine: Negative mg/dL   pH Urine: 5.5   Color: Yellow   Urine Appearance: Clear   Bilirubin: Negative   Ketone - Urine: Negative mg/dL   Specific Gravity: 1.016   Protein, Urine: Trace mg/dL   Urobilinogen: 1.0 mg/dL   Nitrite: Negative   Leukocyte Esterase Concentration: Small  Urine Microscopic-Add On (NC) (01.17.25 @ 17:09)    Fine Granular Casts: Present   Bacteria: Negative /HPF   Epithelial Cells: 4 /HPF   Cast: 3 /LPF   Yeast-like Cells: Present   Review: Reviewed   White Blood Cell - Urine: 15 /HPF   Red Blood Cell - Urine: 4 /HPF   Hyaline Casts: Present    Microbiology: reviewed  Culture - Urine (collected 01-17-25 @ 17:09)  Source: Clean Catch Clean Catch (Midstream)  Final Report (01-18-25 @ 20:03):    >=3 organisms. Probable collection contamination.    Culture - Blood (collected 01-17-25 @ 11:38)  Source: .Blood BLOOD  Preliminary Report (01-20-25 @ 15:01):    No growth at 72 Hours    Respiratory Viral Panel with COVID-19 by ADELSO (01.17.25 @ 17:22)    Rapid RVP Result: Detected   SARS-CoV-2: NotDetec: This Respiratory Panel uses polymerase chain reaction (PCR) to detect for  adenovirus; coronavirus (HKU1, NL63, 229E, OC43); human metapneumovirus  (hMPV); human enterovirus/rhinovirus (Entero/RV); influenza A; influenza  A/H1; influenza A/H3; influenza A/H1-2009; influenza B; parainfluenza  viruses 1, 2, 3, 4; respiratory syncytial virus; Mycoplasma pneumoniae;  Chlamydophila pneumoniae; and SARS-CoV-2.   Coronavirus (229E,HKU1,NL63,OC43): Detected    Radiology--reviewed  < from: CT Chest Abdomen and Pelvis No Cont (01.17.25 @ 17:48) >  IMPRESSION:  Extensive new bilateral groundglass nodular opacities, upper lobe   predominant. The etiology is unclear, question pneumonia.    Small right andtrace left pleural effusion.    Right hepatic mass is not well evaluated but appears decreased in size   since 12/9/2024. Known splenic mass is poorly seen without contrast.    Confluent retroperitoneal soft tissue mass is unchanged, likely   retroperitoneal fibrosis.      --- End of Report ---    < end of copied text >      < from: Xray Chest 1 View- PORTABLE-Urgent (Xray Chest 1 View- PORTABLE-Urgent .) (01.17.25 @ 12:21) >  IMPRESSION:  Left upper extremity PICC with tip overlying SVC.  Bilateral perihilar prominence, may represent mild pulmonary vascular   congestion.    --- End of Report ---    < end of copied text >    Active Medications--  acetaminophen     Tablet .. 650 milliGRAM(s) Oral every 6 hours PRN  alteplase for catheter clearance 2 milliGRAM(s) Catheter once  alteplase for catheter clearance 2 milliGRAM(s) Catheter once  amLODIPine   Tablet 10 milliGRAM(s) Oral daily  buPROPion XL (24-Hour) . 300 milliGRAM(s) Oral daily  carvedilol 12.5 milliGRAM(s) Oral every 12 hours  chlorhexidine 4% Liquid 1 Application(s) Topical <User Schedule>  cloNIDine 0.1 milliGRAM(s) Oral three times a day  dextrose 5%. 1000 milliLiter(s) IV Continuous <Continuous>  dextrose 5%. 1000 milliLiter(s) IV Continuous <Continuous>  dextrose 50% Injectable 25 Gram(s) IV Push once  dextrose 50% Injectable 12.5 Gram(s) IV Push once  dextrose 50% Injectable 25 Gram(s) IV Push once  dextrose Oral Gel 15 Gram(s) Oral once PRN  enoxaparin Injectable 40 milliGRAM(s) SubCutaneous <User Schedule>  escitalopram 10 milliGRAM(s) Oral daily  glucagon  Injectable 1 milliGRAM(s) IntraMuscular once  glucagon  Injectable 1 milliGRAM(s) IntraMuscular once  hydrALAZINE 100 milliGRAM(s) Oral every 8 hours  insulin glargine Injectable (LANTUS) 38 Unit(s) SubCutaneous at bedtime  insulin lispro (ADMELOG) corrective regimen sliding scale   SubCutaneous three times a day before meals  insulin lispro (ADMELOG) corrective regimen sliding scale   SubCutaneous at bedtime  insulin lispro Injectable (ADMELOG) 8 Unit(s) SubCutaneous before breakfast  insulin lispro Injectable (ADMELOG) 8 Unit(s) SubCutaneous before lunch  insulin lispro Injectable (ADMELOG) 8 Unit(s) SubCutaneous before dinner  lactulose Syrup 15 Gram(s) Oral <User Schedule>  lactulose Syrup 10 Gram(s) Oral every 6 hours  levothyroxine 88 MICROGram(s) Oral daily  lisinopril 10 milliGRAM(s) Oral daily  nystatin    Suspension 001142 Unit(s) Oral every 6 hours  pantoprazole   Suspension 40 milliGRAM(s) Oral daily  piperacillin/tazobactam IVPB.. 3.375 Gram(s) IV Intermittent every 8 hours  polyethylene glycol 3350 17 Gram(s) Oral at bedtime  polyethylene glycol 3350 17 Gram(s) Oral daily PRN  potassium phosphate IVPB 15 milliMole(s) IV Intermittent once  predniSONE   Tablet 5 milliGRAM(s) Oral two times a day  rosuvastatin 10 milliGRAM(s) Oral at bedtime  senna 2 Tablet(s) Oral at bedtime  sodium chloride 0.9% lock flush 10 milliLiter(s) IV Push every 1 hour PRN  tacrolimus 1 milliGRAM(s) Oral <User Schedule>  tacrolimus 1 milliGRAM(s) Oral at bedtime    Current Antimicrobials:   nystatin    Suspension 579210 Unit(s) Oral every 6 hours  piperacillin/tazobactam IVPB.. 3.375 Gram(s) IV Intermittent every 8 hours    Prior/Completed Antimicrobials:  piperacillin/tazobactam IVPB.  piperacillin/tazobactam IVPB.-    Immunologic:   tacrolimus 1 milliGRAM(s) Oral <User Schedule>  tacrolimus 1 milliGRAM(s) Oral at bedtime

## 2025-01-20 NOTE — CONSULT NOTE ADULT - ASSESSMENT
[facility]  Chart Reviewed-Full Consult to follow for any immediate concerns please feel free to contact us directly at [phone] and have us paged  Risa Ac MD  Patient is a 69 year old male with PMH of renal transplant 2012 (2/2 DM, s/p left nephrectomy), peripheral neuropathy, hypothyroidism, retroperitoneal fibrosis, HTN, HLD, GERD, anxiety/depression, ANGELIKA and recent admission at [facility] for sacral osteomyelitis and ESBL E.coli urosepsis discharged on 12/18/24 to rehab, recently diagnosed DLBCL while admitted to [place] when he was noted to have hypercalcemia and liver masses s/p biopsu 12/16/24, now s/p R mini CHOP on 12/28 who was admitted 1/17 for cycle #2 Rmini CHOP, upon admission patient is febrile will hold chemotherapy now.    Fevers- may be d/t viral URI d/t coronavirus (not COVID), possible post viral pneumonia   Possible UTI vs contamination   Oral thrush   - temps noted Tm 102F overnight--afebrile since this am, WBC wnl with no neutropenia, on RA  - RVP with Coronavirus (229E,HKU1,NL63,OC43) detected  - CT Chest with extensive new b/l ground glass nodular opacities, upper lobe dominant - possible pneumonia; small R and trace L effusions  - CTAP decreased R hepatic mass since 12/9/24; known splenic mass; changes likely c/w retroperitoneal fibrosis   - UA with some pyuria, small LE, no nitrites or bacteria  - Ucx with >3 organisms - may be contaminated specimen   - no hodges, condom cath noted with clear yellow urine, no gu sx, no suprapubic ttp  - Bcx NGTD (72h)  - LUE PICC line with no sign of infection, no sign of SSTI, no rashes noted   - wound care eval noted for sacral and ischial DTI  - currently on zosyn and for thrush nystatin   - prior cultures reviewed, history of ESBL Kleb in Ucx 12/31/24, ESBL E.coli 11/29/24 Ucx     Recommendations:   Follow 1/19 Bcx and Ucx, in process  Check MRSA PCR screen   Continue on zosyn for now   Monitor temps/CBC -- if spikes another fever, broaden to ertapenem 1g IV Q24h  Supportive care  Aspiration precautions  Wound care, offloading as able, nutrition  Continue rest of care per primary team       D/w NP Vicente Ac M.D.  [facility]  Available on [employer] TEAMS - *PREFERRED*  [phone]  After 5pm on weekdays and all day on weekends - please call [phone]     Thank you for consulting us and involving us in the management of this patients case. In addition to reviewing history, imaging, documents, labs, microbiology, took into account antibiotic stewardship, local antibiogram and infection control strategies and potential transmission issues.

## 2025-01-20 NOTE — PROGRESS NOTE ADULT - PROBLEM SELECTOR PLAN 2
Not neutropenic.  1/17 - F/u BCX  1/17 - F/U CT C/A/P- Extensive new bilateral ground glass nodular opacities, upper lobe predominant. The etiology is unclear, question pneumonia. Small right and trace left pleural effusion. Right hepatic mass is not well evaluated but appears decreased in size  since 12/9/2024. Known splenic mass is poorly seen without contrast.  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  1/17- F/u Flu/COVID PCR , + for Coronavirus   (last hospitalization: osteomyelitis and E coli urosepsis (12/1/24 - 12/18/24)   [facility] (12/18/24): for osteomyelitis & E coli urosepsis).  1/18- Started Zosyn O/N  1/18 - Oral candidiasis - Nystatin S/S  1/19- ID consult, followed by [facility] Not neutropenic.  1/17 - F/u BCX  1/17 - F/U CT C/A/P- Extensive new bilateral ground glass nodular opacities, upper lobe predominant. The etiology is unclear, question pneumonia. Small right and trace left pleural effusion. Right hepatic mass is not well evaluated but appears decreased in size  since 12/9/2024. Known splenic mass is poorly seen without contrast.  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  1/17- F/u Flu/COVID PCR , + for Coronavirus   (last hospitalization: osteomyelitis and E coli urosepsis (12/1/24 - 12/18/24)   [facility] (12/18/24): for osteomyelitis & E coli urosepsis).  D/[facility] on 1/20  Zosyn O/N  1/18 - Oral candidiasis - Nystatin S/S  1/19- ID consult, followed by [facility], Zosyn changed to Ertapenem  history of ESBL Kleb in Ucx 12/31/24, ESBL E.coli 11/29/24 Ucx

## 2025-01-20 NOTE — PROGRESS NOTE ADULT - ASSESSMENT
M 68 y/o M PMHx renal transplant 2012 (2/2 DM, s/p left nephrectomy), peripheral neuropathy, hypothyroidism, retroperitoneal fibrosis, HTN, HLD, GERD, anxiety/depression, ANGELIKA and recent admission at [facility] for sacral osteomyelitis and ESBL.coli urosepsis discharged on 12/18/24 to rehab. While admitted to [place] was noted to have hypercalcemia and liver masses which were biopsied on 12/16/24, biopsy revealed DLBCL. Patient received R mini CHOP on 12/28 now admitted for cycle #2 Rmini CHOP, upon admission patient is febrile will hold chemotherapy today.

## 2025-01-20 NOTE — PROGRESS NOTE ADULT - SUBJECTIVE AND OBJECTIVE BOX
Diagnosis: PTLD    Protocol/Chemo Regimen: (On hold)    Pt endorsed:  +fatigue  sacral pain, discomfort    Review of Systems:   Denies any nausea, vomiting chest pain, palpitation, SOB, abdominal pain.     Pain scale: sacral area pain, discomfort    Diet: Consistent carb    Allergies: No Known Allergies    ANTIMICROBIALS  nystatin    Suspension 180845 Unit(s) Oral every 6 hours  piperacillin/tazobactam IVPB.. 3.375 Gram(s) IV Intermittent every 8 hours    HEME/ONC MEDICATIONS  alteplase for catheter clearance 2 milliGRAM(s) Catheter once  alteplase for catheter clearance 2 milliGRAM(s) Catheter once  enoxaparin Injectable 40 milliGRAM(s) SubCutaneous <User Schedule>    STANDING MEDICATIONS  amLODIPine   Tablet 10 milliGRAM(s) Oral daily  buPROPion XL (24-Hour) . 300 milliGRAM(s) Oral daily  carvedilol 12.5 milliGRAM(s) Oral every 12 hours  chlorhexidine 4% Liquid 1 Application(s) Topical <User Schedule>  cloNIDine 0.1 milliGRAM(s) Oral three times a day  dextrose 5%. 1000 milliLiter(s) IV Continuous <Continuous>  dextrose 5%. 1000 milliLiter(s) IV Continuous <Continuous>  dextrose 50% Injectable 25 Gram(s) IV Push once  dextrose 50% Injectable 12.5 Gram(s) IV Push once  dextrose 50% Injectable 25 Gram(s) IV Push once  escitalopram 10 milliGRAM(s) Oral daily  glucagon  Injectable 1 milliGRAM(s) IntraMuscular once  glucagon  Injectable 1 milliGRAM(s) IntraMuscular once  hydrALAZINE 100 milliGRAM(s) Oral every 8 hours  insulin glargine Injectable (LANTUS) 38 Unit(s) SubCutaneous at bedtime  insulin lispro (ADMELOG) corrective regimen sliding scale   SubCutaneous three times a day before meals  insulin lispro (ADMELOG) corrective regimen sliding scale   SubCutaneous at bedtime  insulin lispro Injectable (ADMELOG) 8 Unit(s) SubCutaneous before breakfast  insulin lispro Injectable (ADMELOG) 8 Unit(s) SubCutaneous before lunch  insulin lispro Injectable (ADMELOG) 8 Unit(s) SubCutaneous before dinner  lactulose Syrup 15 Gram(s) Oral <User Schedule>  lactulose Syrup 10 Gram(s) Oral every 6 hours  levothyroxine 88 MICROGram(s) Oral daily  lisinopril 10 milliGRAM(s) Oral daily  pantoprazole   Suspension 40 milliGRAM(s) Oral daily  polyethylene glycol 3350 17 Gram(s) Oral at bedtime  potassium phosphate IVPB 15 milliMole(s) IV Intermittent once  predniSONE   Tablet 5 milliGRAM(s) Oral two times a day  rosuvastatin 10 milliGRAM(s) Oral at bedtime  senna 2 Tablet(s) Oral at bedtime  tacrolimus 1 milliGRAM(s) Oral <User Schedule>  tacrolimus 1 milliGRAM(s) Oral at bedtime    PRN MEDICATIONS  acetaminophen     Tablet .. 650 milliGRAM(s) Oral every 6 hours PRN  dextrose Oral Gel 15 Gram(s) Oral once PRN  polyethylene glycol 3350 17 Gram(s) Oral daily PRN  sodium chloride 0.9% lock flush 10 milliLiter(s) IV Push every 1 hour PRN    Vital Signs Last 24 Hrs  T(C): 37 (20 Jan 2025 09:00), Max: 38.9 (19 Jan 2025 23:22)  T(F): 98.6 (20 Jan 2025 09:00), Max: 102 (19 Jan 2025 23:22)  HR: 73 (20 Jan 2025 09:00) (72 - 96)  BP: 123/60 (20 Jan 2025 09:00) (123/60 - 160/70)  BP(mean): --  RR: 18 (20 Jan 2025 09:00) (18 - 20)  SpO2: 93% (20 Jan 2025 09:00) (92% - 93%)    Parameters below as of 20 Jan 2025 09:00  Patient On (Oxygen Delivery Method): room air    PHYSICAL EXAM  General: NAD  HEENT: oral thrush +  CV: (+) S1/S2 RRR  Lungs: diminished B/L  Abdomen: soft, non-tender, non-distended (+) BS  Ext: no clubbing, cyanosis or edema  Skin: no rashes and no petechiae, +sacrococcygeal joint   Neuro: alert and oriented X 3, no focal deficits  Central Line: Left arm D/L PICC, CDI    RECENT CULTURES:  01-17 @ 17:09  Clean Catch Clean Catch (Midstream)  >=3 organisms. Probable collection contamination.    01-17 @ 11:38  .Blood BLOOD  No growth at 48 Hours    LABS:                        7.5    7.31  )-----------( 485      ( 20 Jan 2025 06:57 )             23.9     Mean Cell Volume : 92.6 fl  Mean Cell Hemoglobin : 29.1 pg  Mean Cell Hemoglobin Concentration : 31.4 g/dL  Auto Neutrophil # : 6.58 K/uL  Auto Lymphocyte # : 0.18 K/uL  Auto Monocyte # : 0.35 K/uL  Auto Eosinophil # : 0.03 K/uL  Auto Basophil # : 0.03 K/uL  Auto Neutrophil % : 90.0 %  Auto Lymphocyte % : 2.5 %  Auto Monocyte % : 4.8 %  Auto Eosinophil % : 0.4 %  Auto Basophil % : 0.4 %    01-20    134[L]  |  101  |  32[H]  ----------------------------<  52[LL]  4.8   |  23  |  1.15    Ca    8.9      20 Jan 2025 06:57  Phos  2.0     01-20  Mg     2.0     01-20    TPro  5.7[L]  /  Alb  2.6[L]  /  TBili  0.2  /  DBili  x   /  AST  51[H]  /  ALT  62[H]  /  AlkPhos  95  01-20  Mg 2.0  Phos 2.0    RADIOLOGY & ADDITIONAL STUDIES:  CT Abdomen and Pelvis No Cont (01.17.25 @ 17:48) >  Extensive new bilateral ground-glass nodular opacities, upper lobe   predominant. The etiology is unclear, question pneumonia.  Small right and trace left pleural effusion.  Right hepatic mass is not well evaluated but appears decreased in size   since 12/9/2024. Known splenic mass is poorly seen without contrast.  Confluent retroperitoneal soft tissue mass is unchanged, likely   retroperitoneal fibrosis.

## 2025-01-20 NOTE — PROGRESS NOTE ADULT - PROBLEM SELECTOR PLAN 1
Admitted  for R mini CHOP, found to be febrile on admission hold  chemo today.  Monitor CBC with diff, transfuse as needed.  Monitor electrolytes, replete as needed.  Daily weights.  Strict I/O.  1/19- Constipation resolved.  1/19- Hypophosphatemia - Replete phos.

## 2025-01-21 LAB
ALBUMIN SERPL ELPH-MCNC: 2.7 G/DL — LOW (ref 3.3–5)
ALP SERPL-CCNC: 112 U/L — SIGNIFICANT CHANGE UP (ref 40–120)
ALT FLD-CCNC: 60 U/L — HIGH (ref 10–45)
ANION GAP SERPL CALC-SCNC: 10 MMOL/L — SIGNIFICANT CHANGE UP (ref 5–17)
APTT BLD: 35.8 SEC — HIGH (ref 24.5–35.6)
AST SERPL-CCNC: 52 U/L — HIGH (ref 10–40)
BASOPHILS # BLD AUTO: 0.18 K/UL — SIGNIFICANT CHANGE UP (ref 0–0.2)
BASOPHILS NFR BLD AUTO: 1.7 % — SIGNIFICANT CHANGE UP (ref 0–2)
BILIRUB SERPL-MCNC: 0.3 MG/DL — SIGNIFICANT CHANGE UP (ref 0.2–1.2)
BUN SERPL-MCNC: 28 MG/DL — HIGH (ref 7–23)
CALCIUM SERPL-MCNC: 9 MG/DL — SIGNIFICANT CHANGE UP (ref 8.4–10.5)
CHLORIDE SERPL-SCNC: 102 MMOL/L — SIGNIFICANT CHANGE UP (ref 96–108)
CK SERPL-CCNC: 35 U/L — SIGNIFICANT CHANGE UP (ref 30–200)
CO2 SERPL-SCNC: 22 MMOL/L — SIGNIFICANT CHANGE UP (ref 22–31)
CREAT SERPL-MCNC: 1.04 MG/DL — SIGNIFICANT CHANGE UP (ref 0.5–1.3)
D DIMER BLD IA.RAPID-MCNC: 2685 NG/ML DDU — HIGH
EGFR: 79 ML/MIN/1.73M2 — SIGNIFICANT CHANGE UP
EOSINOPHIL # BLD AUTO: 0.1 K/UL — SIGNIFICANT CHANGE UP (ref 0–0.5)
EOSINOPHIL NFR BLD AUTO: 0.9 % — SIGNIFICANT CHANGE UP (ref 0–6)
FIBRINOGEN PPP-MCNC: 843 MG/DL — HIGH (ref 200–445)
GLUCOSE BLDC GLUCOMTR-MCNC: 111 MG/DL — HIGH (ref 70–99)
GLUCOSE BLDC GLUCOMTR-MCNC: 137 MG/DL — HIGH (ref 70–99)
GLUCOSE BLDC GLUCOMTR-MCNC: 223 MG/DL — HIGH (ref 70–99)
GLUCOSE BLDC GLUCOMTR-MCNC: 238 MG/DL — HIGH (ref 70–99)
GLUCOSE SERPL-MCNC: 266 MG/DL — HIGH (ref 70–99)
HCT VFR BLD CALC: 26.9 % — LOW (ref 39–50)
HGB BLD-MCNC: 8.3 G/DL — LOW (ref 13–17)
IGG FLD-MCNC: 950 MG/DL — SIGNIFICANT CHANGE UP (ref 610–1660)
IGG1 SER-MCNC: 506 MG/DL — SIGNIFICANT CHANGE UP (ref 240–1118)
IGG2 SER-MCNC: 374 MG/DL — SIGNIFICANT CHANGE UP (ref 124–549)
IGG3 SER-MCNC: 25.2 MG/DL — SIGNIFICANT CHANGE UP (ref 15–102)
IGG4 SER-MCNC: 33.4 MG/DL — SIGNIFICANT CHANGE UP (ref 1–123)
INR BLD: 1.1 RATIO — SIGNIFICANT CHANGE UP (ref 0.85–1.16)
LDH SERPL L TO P-CCNC: 350 U/L — HIGH (ref 50–242)
LYMPHOCYTES # BLD AUTO: 0.1 K/UL — LOW (ref 1–3.3)
LYMPHOCYTES # BLD AUTO: 0.9 % — LOW (ref 13–44)
MAGNESIUM SERPL-MCNC: 1.9 MG/DL — SIGNIFICANT CHANGE UP (ref 1.6–2.6)
MCHC RBC-ENTMCNC: 28.6 PG — SIGNIFICANT CHANGE UP (ref 27–34)
MCHC RBC-ENTMCNC: 30.9 G/DL — LOW (ref 32–36)
MCV RBC AUTO: 92.8 FL — SIGNIFICANT CHANGE UP (ref 80–100)
MONOCYTES # BLD AUTO: 0.38 K/UL — SIGNIFICANT CHANGE UP (ref 0–0.9)
MONOCYTES NFR BLD AUTO: 3.5 % — SIGNIFICANT CHANGE UP (ref 2–14)
MRSA PCR RESULT.: SIGNIFICANT CHANGE UP
NEUTROPHILS # BLD AUTO: 9.81 K/UL — HIGH (ref 1.8–7.4)
NEUTROPHILS NFR BLD AUTO: 90.3 % — HIGH (ref 43–77)
NT-PROBNP SERPL-SCNC: 635 PG/ML — HIGH (ref 0–300)
PHOSPHATE SERPL-MCNC: 2.5 MG/DL — SIGNIFICANT CHANGE UP (ref 2.5–4.5)
PLATELET # BLD AUTO: 633 K/UL — HIGH (ref 150–400)
POTASSIUM SERPL-MCNC: 5.2 MMOL/L — SIGNIFICANT CHANGE UP (ref 3.5–5.3)
POTASSIUM SERPL-SCNC: 5.2 MMOL/L — SIGNIFICANT CHANGE UP (ref 3.5–5.3)
PROT SERPL-MCNC: 6.3 G/DL — SIGNIFICANT CHANGE UP (ref 6–8.3)
PROTHROM AB SERPL-ACNC: 12.6 SEC — SIGNIFICANT CHANGE UP (ref 9.9–13.4)
RBC # BLD: 2.9 M/UL — LOW (ref 4.2–5.8)
RBC # FLD: 19.8 % — HIGH (ref 10.3–14.5)
S AUREUS DNA NOSE QL NAA+PROBE: DETECTED
SODIUM SERPL-SCNC: 134 MMOL/L — LOW (ref 135–145)
URATE SERPL-MCNC: 4.3 MG/DL — SIGNIFICANT CHANGE UP (ref 3.4–8.8)
WBC # BLD: 10.76 K/UL — HIGH (ref 3.8–10.5)
WBC # FLD AUTO: 10.76 K/UL — HIGH (ref 3.8–10.5)

## 2025-01-21 PROCEDURE — 71045 X-RAY EXAM CHEST 1 VIEW: CPT | Mod: 26

## 2025-01-21 PROCEDURE — 99233 SBSQ HOSP IP/OBS HIGH 50: CPT | Mod: FS

## 2025-01-21 PROCEDURE — 99222 1ST HOSP IP/OBS MODERATE 55: CPT | Mod: GC

## 2025-01-21 RX ORDER — ACETAMINOPHEN 160 MG/5ML
1000 SUSPENSION ORAL ONCE
Refills: 0 | Status: COMPLETED | OUTPATIENT
Start: 2025-01-21 | End: 2025-01-21

## 2025-01-21 RX ORDER — ROSUVASTATIN 40 MG/1
1 TABLET, FILM COATED ORAL
Refills: 0 | DISCHARGE

## 2025-01-21 RX ORDER — BUPROPION HYDROCHLORIDE 150 MG/1
1 TABLET, EXTENDED RELEASE ORAL
Qty: 0 | Refills: 0 | DISCHARGE
Start: 2025-01-21

## 2025-01-21 RX ADMIN — Medication 12.5 MILLIGRAM(S): at 18:00

## 2025-01-21 RX ADMIN — CLONIDINE HYDROCHLORIDE 0.1 MILLIGRAM(S): 0.2 TABLET ORAL at 06:12

## 2025-01-21 RX ADMIN — ACETAMINOPHEN 400 MILLIGRAM(S): 160 SUSPENSION ORAL at 22:01

## 2025-01-21 RX ADMIN — LEVOTHYROXINE SODIUM 88 MICROGRAM(S): 25 TABLET ORAL at 06:12

## 2025-01-21 RX ADMIN — PREDNISONE 5 MILLIGRAM(S): 5 TABLET ORAL at 06:10

## 2025-01-21 RX ADMIN — INSULIN GLARGINE-YFGN 38 UNIT(S): 100 INJECTION, SOLUTION SUBCUTANEOUS at 23:40

## 2025-01-21 RX ADMIN — ANTISEPTIC SURGICAL SCRUB 1 APPLICATION(S): 0.04 SOLUTION TOPICAL at 08:39

## 2025-01-21 RX ADMIN — Medication 500000 UNIT(S): at 00:13

## 2025-01-21 RX ADMIN — ENOXAPARIN SODIUM 40 MILLIGRAM(S): 100 INJECTION SUBCUTANEOUS at 23:39

## 2025-01-21 RX ADMIN — TACROLIMUS 1 MILLIGRAM(S): 1 CAPSULE, GELATIN COATED ORAL at 22:06

## 2025-01-21 RX ADMIN — ACETAMINOPHEN 650 MILLIGRAM(S): 160 SUSPENSION ORAL at 07:11

## 2025-01-21 RX ADMIN — Medication 100 MILLIGRAM(S): at 22:17

## 2025-01-21 RX ADMIN — ACETAMINOPHEN 650 MILLIGRAM(S): 160 SUSPENSION ORAL at 06:11

## 2025-01-21 RX ADMIN — CLONIDINE HYDROCHLORIDE 0.1 MILLIGRAM(S): 0.2 TABLET ORAL at 22:07

## 2025-01-21 RX ADMIN — TACROLIMUS 1 MILLIGRAM(S): 1 CAPSULE, GELATIN COATED ORAL at 08:38

## 2025-01-21 RX ADMIN — BUPROPION HYDROCHLORIDE 300 MILLIGRAM(S): 150 TABLET, EXTENDED RELEASE ORAL at 12:33

## 2025-01-21 RX ADMIN — ESCITALOPRAM 10 MILLIGRAM(S): 10 TABLET, FILM COATED ORAL at 12:33

## 2025-01-21 RX ADMIN — ACETAMINOPHEN 1000 MILLIGRAM(S): 160 SUSPENSION ORAL at 23:01

## 2025-01-21 RX ADMIN — Medication 12.5 MILLIGRAM(S): at 06:10

## 2025-01-21 RX ADMIN — ERTAPENEM SODIUM 120 MILLIGRAM(S): 1 INJECTION, POWDER, LYOPHILIZED, FOR SOLUTION INTRAMUSCULAR; INTRAVENOUS at 18:01

## 2025-01-21 RX ADMIN — Medication 8 UNIT(S): at 08:38

## 2025-01-21 RX ADMIN — Medication 4: at 17:58

## 2025-01-21 RX ADMIN — Medication 100 MILLIGRAM(S): at 14:33

## 2025-01-21 RX ADMIN — Medication 500000 UNIT(S): at 12:34

## 2025-01-21 RX ADMIN — PANTOPRAZOLE 40 MILLIGRAM(S): 20 TABLET, DELAYED RELEASE ORAL at 12:34

## 2025-01-21 RX ADMIN — Medication 8 UNIT(S): at 12:34

## 2025-01-21 RX ADMIN — Medication 500000 UNIT(S): at 23:44

## 2025-01-21 RX ADMIN — ACETAMINOPHEN 650 MILLIGRAM(S): 160 SUSPENSION ORAL at 23:01

## 2025-01-21 RX ADMIN — POLYETHYLENE GLYCOL 3350 17 GRAM(S): 17 POWDER, FOR SOLUTION ORAL at 22:07

## 2025-01-21 RX ADMIN — PREDNISONE 5 MILLIGRAM(S): 5 TABLET ORAL at 18:00

## 2025-01-21 RX ADMIN — Medication 500000 UNIT(S): at 17:59

## 2025-01-21 RX ADMIN — CLONIDINE HYDROCHLORIDE 0.1 MILLIGRAM(S): 0.2 TABLET ORAL at 14:32

## 2025-01-21 RX ADMIN — Medication 500000 UNIT(S): at 06:11

## 2025-01-21 RX ADMIN — Medication 2 TABLET(S): at 22:07

## 2025-01-21 RX ADMIN — ACETAMINOPHEN 650 MILLIGRAM(S): 160 SUSPENSION ORAL at 13:16

## 2025-01-21 RX ADMIN — ROSUVASTATIN CALCIUM 10 MILLIGRAM(S): 10 TABLET, FILM COATED ORAL at 22:06

## 2025-01-21 RX ADMIN — Medication 8 UNIT(S): at 17:59

## 2025-01-21 RX ADMIN — ACETAMINOPHEN 650 MILLIGRAM(S): 160 SUSPENSION ORAL at 21:09

## 2025-01-21 RX ADMIN — Medication 10 MILLIGRAM(S): at 06:11

## 2025-01-21 RX ADMIN — Medication 100 MILLIGRAM(S): at 06:13

## 2025-01-21 RX ADMIN — ACETAMINOPHEN 650 MILLIGRAM(S): 160 SUSPENSION ORAL at 14:16

## 2025-01-21 NOTE — DISCHARGE NOTE PROVIDER - NSDCCPTREATMENT_GEN_ALL_CORE_FT
PRINCIPAL PROCEDURE  Procedure: CT scan  Findings and Treatment: FINDINGS:  CHEST:  LUNGS AND LARGE AIRWAYS: Patent central airways. Diffuse bilateral   predominantly perihilar alveolar opacities. Left base atelectasis  PLEURA: Small left effusion and tiny right effusion  VESSELS: Within normal limits.  HEART:Heart size is normal. No pericardial effusion.  MEDIASTINUM AND PATRICE: No lymphadenopathy.  CHEST WALL AND LOWER NECK: Within normal limits.  ABDOMEN AND PELVIS:  LIVER: 3.2 cm right hepatic lobe hypodensity without significant change.  BILE DUCTS: Normal caliber.  GALLBLADDER: Cholecystectomy.  SPLEEN: Within normal limits.  PANCREAS: Within normal limits.  ADRENALS: Within normal limits.  KIDNEYS/URETERS: Left nephrectomy and atrophic right kidney with 1.4 cm   indeterminate lesion in the interpolar region, unchanged. Right lower   quadrant renal transplant.  BLADDER: Within normal limits.  REPRODUCTIVE ORGANS: Prostate within normal limits.  BOWEL: No bowel obstruction. Appendix is not visualized.  PERITONEUM/RETROPERITONEUM: Unchanged retroperitoneal soft tissue   encasing the aorta and IVC.  VESSELS: Within normal limits.  LYMPH NODES: No lymphadenopathy.  ABDOMINAL WALL: Ventral abdominal wall postsurgical changes with an   unchanged 3.7 cm partially calcified fat and soft tissue containing   collection (3-270).  BONES: Diffuse erosive changes of the sacrum with soft tissue   infiltration, similar in appearance to prior. Sclerotic focus in the left   anterolateral seventh rib. Chronic rib fractures. Chronic left humeral   fracture. Left hip replacement  .  IMPRESSION:  Diffuse bilateral patchy groundglass opacities with central predominance,   increased from 1/17/2025, may represent edema or pneumonia.  Unchanged left lower lobe round atelectasis.  Small left pleural effusion.  Unchanged retroperitoneal soft tissue encasing the aorta and IVC.  Diffuse erosive changes of the sacrum with soft tissue infiltration,   similar in appearance to prior, which could represent osteomyelitis.

## 2025-01-21 NOTE — PROGRESS NOTE ADULT - NS ATTEND AMEND GEN_ALL_CORE FT
.  Primary: [place]    Vital Signs Last 24 Hrs  T(C): 37.1 (21 Jan 2025 01:03), Max: 38.3 (20 Jan 2025 17:36)  T(F): 98.8 (21 Jan 2025 01:03), Max: 101 (20 Jan 2025 17:36)  HR: 73 (21 Jan 2025 01:03) (72 - 89)  BP: 157/77 (21 Jan 2025 01:03) (123/60 - 158/62)  BP(mean): --  RR: 18 (21 Jan 2025 01:03) (18 - 20)  SpO2: 92% (21 Jan 2025 01:03) (92% - 93%)    Parameters below as of 21 Jan 2025 01:03  Patient On (Oxygen Delivery Method): room air    MEDICATIONS  (STANDING):  alteplase for catheter clearance 2 milliGRAM(s) Catheter once  alteplase for catheter clearance 2 milliGRAM(s) Catheter once  amLODIPine   Tablet 10 milliGRAM(s) Oral daily  buPROPion XL (24-Hour) . 300 milliGRAM(s) Oral daily  carvedilol 12.5 milliGRAM(s) Oral every 12 hours  chlorhexidine 4% Liquid 1 Application(s) Topical <User Schedule>  cloNIDine 0.1 milliGRAM(s) Oral three times a day  dextrose 5%. 1000 milliLiter(s) (50 mL/Hr) IV Continuous <Continuous>  dextrose 5%. 1000 milliLiter(s) (100 mL/Hr) IV Continuous <Continuous>  dextrose 50% Injectable 25 Gram(s) IV Push once  dextrose 50% Injectable 12.5 Gram(s) IV Push once  dextrose 50% Injectable 25 Gram(s) IV Push once  enoxaparin Injectable 40 milliGRAM(s) SubCutaneous <User Schedule>  ertapenem  IVPB      ertapenem  IVPB 1000 milliGRAM(s) IV Intermittent every 24 hours  escitalopram 10 milliGRAM(s) Oral daily  glucagon  Injectable 1 milliGRAM(s) IntraMuscular once  glucagon  Injectable 1 milliGRAM(s) IntraMuscular once  hydrALAZINE 100 milliGRAM(s) Oral every 8 hours  insulin glargine Injectable (LANTUS) 38 Unit(s) SubCutaneous at bedtime  insulin lispro (ADMELOG) corrective regimen sliding scale   SubCutaneous three times a day before meals  insulin lispro (ADMELOG) corrective regimen sliding scale   SubCutaneous at bedtime  insulin lispro Injectable (ADMELOG) 8 Unit(s) SubCutaneous before breakfast  insulin lispro Injectable (ADMELOG) 8 Unit(s) SubCutaneous before lunch  insulin lispro Injectable (ADMELOG) 8 Unit(s) SubCutaneous before dinner  lactulose Syrup 15 Gram(s) Oral <User Schedule>  lactulose Syrup 10 Gram(s) Oral every 6 hours  levothyroxine 88 MICROGram(s) Oral daily  lisinopril 10 milliGRAM(s) Oral daily  nystatin    Suspension 825598 Unit(s) Oral every 6 hours  pantoprazole   Suspension 40 milliGRAM(s) Oral daily  polyethylene glycol 3350 17 Gram(s) Oral at bedtime  predniSONE   Tablet 5 milliGRAM(s) Oral two times a day  rosuvastatin 10 milliGRAM(s) Oral at bedtime  senna 2 Tablet(s) Oral at bedtime  tacrolimus 1 milliGRAM(s) Oral <User Schedule>  tacrolimus 1 milliGRAM(s) Oral at bedtime      Assessment: Piero [facility] for day 1 cycle 2 mini H(O)P for PTLD but suffered from urosepsis with Klebsiella ESBL (Zosyn sensitive) and nasal pinzon virus.  wife (Ludmila), daughter (Luz Marina) and son (Jose J).    PMHx::  1) renal transplant 2012: left nephrectomy; renal transplant was secondary to DM, 2) AODM, 3) HLD, 4) hypothyroidism, 5) peripheral neuropathy  6) retroperitoneal fibrosis, 7) GERD, 8) HTN, 9) anxiety/depression, 10) obstructive sleep apnea, 11) osteomyelitis and E coli urosepsis (12/1/24 - 12/18/24)    Plan:  Hold planned chemotherapy. given toxic presentation and sustained high fevers unlikely to continue anthracycline-based treatment.   Consider Tafa/REV - please see social section.     ID:  ertapenem (1/20/25 - ), nystatin S&S; IgG level: pending;   zosyn (1/17/25 - 1/20/25)    Radiographs: Last chest/abd pelvis: 1/17/25    Renal: continue /pred -- pred 5mg bid (50% dose reduction) 1/18/25  AT RISK FOR ADRENAL INSUFFIENCEY IF HYPOTENSIVE 50mg hydrocortisone IV q8     Please obtain: PM&R consult - unclear etiology of his poor ECOG PS.    Social: need to discuss treatment with Tafa/Rev.  His current rehab facility will not allow for transport to outpatient oncology to treat his lymphoma.      Over 60 minutes were spent in direct patient care and care coordination.

## 2025-01-21 NOTE — PROGRESS NOTE ADULT - SUBJECTIVE AND OBJECTIVE BOX
[facility]  WANG Mccoy Y. Patel, S. Shah, G. Casimir  [phone]  ([phone] - weekdays after 5pm and weekends)    Name: JAN CALVIN  Age/Gender: 69y Male  MRN: [id number]    Interval History:  Patient seen and examined this morning.   Febrile overnight, patient denies feeling fever/chills.  Denies cough, dyspnea, pain, n/v/d, dysuria.   Notes reviewed.   Allergies: No Known Allergies      Objective:  Vitals:   T(F): 99.3 (01-21-25 @ 09:00), Max: 101.1 (01-21-25 @ 05:48)  HR: 77 (01-21-25 @ 09:00) (72 - 89)  BP: 145/66 (01-21-25 @ 09:00) (127/68 - 158/62)  RR: 18 (01-21-25 @ 09:00) (18 - 20)  SpO2: 94% (01-21-25 @ 09:00) (92% - 94%)  Physical Examination:  General: no acute distress, on RA   HEENT: normocephalic, atraumatic, anicteric  Lungs: clear to auscultation anteriorly   Heart: S1, S2 present, normal rate  Abdomen: Soft, nontender, nondistended    Neuro: AAOx3, no obvious focal deficits   Extremities: No cyanosis. No edema.   Skin: Warm. Dry. No visible rash.   Lines: LUE PICC with no erythema/TTP    Laboratory Studies:  CBC:                       7.5    7.31  )-----------( 485      ( 20 Jan 2025 06:57 )             23.9     WBC Trend:  7.31 01-20-25 @ 06:57  6.55 01-19-25 @ 06:51  7.68 01-18-25 @ 07:02  6.45 01-17-25 @ 12:55    CMP: 01-20    134[L]  |  101  |  32[H]  ----------------------------<  52[LL]  4.8   |  23  |  1.15    Ca    8.9      20 Jan 2025 06:57  Phos  2.0     01-20  Mg     2.0     01-20    TPro  5.7[L]  /  Alb  2.6[L]  /  TBili  0.2  /  DBili  x   /  AST  51[H]  /  ALT  62[H]  /  AlkPhos  95  01-20    Creatinine: 1.15 mg/dL (01-20-25 @ 06:57)  Creatinine: 1.04 mg/dL (01-19-25 @ 06:54)  Creatinine: 0.93 mg/dL (01-18-25 @ 07:02)  Creatinine: 1.01 mg/dL (01-18-25 @ 02:41)  Creatinine: 0.81 mg/dL (01-17-25 @ 12:55)    LIVER FUNCTIONS - ( 20 Jan 2025 06:57 )  Alb: 2.6 g/dL / Pro: 5.7 g/dL / ALK PHOS: 95 U/L / ALT: 62 U/L / AST: 51 U/L / GGT: x           Microbiology: reviewed   Culture - Blood (collected 01-19-25 @ 18:36)  Source: .Blood BLOOD  Preliminary Report (01-20-25 @ 23:02):    No growth at 24 hours    Culture - Urine (collected 01-19-25 @ 18:36)  Source: Clean Catch Clean Catch (Midstream)  Final Report (01-20-25 @ 21:26):    10,000 - 49,000 CFU/mL Candida albicans    "Susceptibilities not performed"    Culture - Urine (collected 01-17-25 @ 17:09)  Source: Clean Catch Clean Catch (Midstream)  Final Report (01-18-25 @ 20:03):    >=3 organisms. Probable collection contamination.    Culture - Blood (collected 01-17-25 @ 11:38)  Source: .Blood BLOOD  Preliminary Report (01-20-25 @ 15:01):    No growth at 72 Hours    SARS-CoV-2 Result: NotDete (17 Jan 2025 17:22)    Radiology: reviewed     Medications:  acetaminophen     Tablet .. 650 milliGRAM(s) Oral every 6 hours PRN  alteplase for catheter clearance 2 milliGRAM(s) Catheter once  alteplase for catheter clearance 2 milliGRAM(s) Catheter once  amLODIPine   Tablet 10 milliGRAM(s) Oral daily  buPROPion XL (24-Hour) . 300 milliGRAM(s) Oral daily  carvedilol 12.5 milliGRAM(s) Oral every 12 hours  chlorhexidine 4% Liquid 1 Application(s) Topical <User Schedule>  cloNIDine 0.1 milliGRAM(s) Oral three times a day  dextrose 5%. 1000 milliLiter(s) IV Continuous <Continuous>  dextrose 5%. 1000 milliLiter(s) IV Continuous <Continuous>  dextrose 50% Injectable 25 Gram(s) IV Push once  dextrose 50% Injectable 12.5 Gram(s) IV Push once  dextrose 50% Injectable 25 Gram(s) IV Push once  dextrose Oral Gel 15 Gram(s) Oral once PRN  enoxaparin Injectable 40 milliGRAM(s) SubCutaneous <User Schedule>  ertapenem  IVPB      ertapenem  IVPB 1000 milliGRAM(s) IV Intermittent every 24 hours  escitalopram 10 milliGRAM(s) Oral daily  glucagon  Injectable 1 milliGRAM(s) IntraMuscular once  glucagon  Injectable 1 milliGRAM(s) IntraMuscular once  hydrALAZINE 100 milliGRAM(s) Oral every 8 hours  insulin glargine Injectable (LANTUS) 38 Unit(s) SubCutaneous at bedtime  insulin lispro (ADMELOG) corrective regimen sliding scale   SubCutaneous three times a day before meals  insulin lispro (ADMELOG) corrective regimen sliding scale   SubCutaneous at bedtime  insulin lispro Injectable (ADMELOG) 8 Unit(s) SubCutaneous before breakfast  insulin lispro Injectable (ADMELOG) 8 Unit(s) SubCutaneous before lunch  insulin lispro Injectable (ADMELOG) 8 Unit(s) SubCutaneous before dinner  lactulose Syrup 15 Gram(s) Oral <User Schedule>  lactulose Syrup 10 Gram(s) Oral every 6 hours  levothyroxine 88 MICROGram(s) Oral daily  lisinopril 10 milliGRAM(s) Oral daily  nystatin    Suspension 402403 Unit(s) Oral every 6 hours  pantoprazole   Suspension 40 milliGRAM(s) Oral daily  polyethylene glycol 3350 17 Gram(s) Oral at bedtime  polyethylene glycol 3350 17 Gram(s) Oral daily PRN  predniSONE   Tablet 5 milliGRAM(s) Oral two times a day  rosuvastatin 10 milliGRAM(s) Oral at bedtime  senna 2 Tablet(s) Oral at bedtime  sodium chloride 0.9% lock flush 10 milliLiter(s) IV Push every 1 hour PRN  tacrolimus 1 milliGRAM(s) Oral <User Schedule>  tacrolimus 1 milliGRAM(s) Oral at bedtime    Current Antimicrobials:  ertapenem  IVPB      ertapenem  IVPB 1000 milliGRAM(s) IV Intermittent every 24 hours  nystatin    Suspension 227588 Unit(s) Oral every 6 hours    Prior/Completed Antimicrobials:  ertapenem  IVPB  piperacillin/tazobactam IVPB.  piperacillin/tazobactam IVPB.-

## 2025-01-21 NOTE — DISCHARGE NOTE PROVIDER - NSDCCPCAREPLAN_GEN_ALL_CORE_FT
PRINCIPAL DISCHARGE DIAGNOSIS  Diagnosis: DLBCL (diffuse large B cell lymphoma)  Assessment and Plan of Treatment: maintain counts, remain free from infection. Notify MD and report to ER for any temperature greater than or equal to 100.4 degrees, intractable nausea, vomiting, diarrhea, or uncontrolled bleeding.       PRINCIPAL DISCHARGE DIAGNOSIS  Diagnosis: DLBCL (diffuse large B cell lymphoma)  Assessment and Plan of Treatment: maintain counts, remain free from infection. Notify MD and report to ER for any temperature greater than or equal to 100.4 degrees, intractable nausea, vomiting, diarrhea, or uncontrolled bleeding.        SECONDARY DISCHARGE DIAGNOSES  Diagnosis: Pneumonia, pneumocystis  Assessment and Plan of Treatment: You came to the hospital with fever, but were found to have an infection preventing you from receiving chemotherapy. We treated you empirically for PJP pneumonia with Bactrim. You required a lot of oxygen initially, but this was titrated down as the pneumonia improved. You experienced an adverse effect of bactrim, high potassium levels in the blood, and were transitioned to an alternative antibiotic, atovaquone. You will continue atovaquone every 12 hours until February 13. Return to the hospital if you experience fever, chest pain, shortness of breath, palpitations, loss of consciousness, abdominal pain, diarrhea.     PRINCIPAL DISCHARGE DIAGNOSIS  Diagnosis: DLBCL (diffuse large B cell lymphoma)  Assessment and Plan of Treatment: maintain counts, remain free from infection. Notify MD and report to ER for any temperature greater than or equal to 100.4 degrees, intractable nausea, vomiting, diarrhea, or uncontrolled bleeding.        SECONDARY DISCHARGE DIAGNOSES  Diagnosis: Pneumonia, pneumocystis  Assessment and Plan of Treatment: You came to the hospital with fever, but were found to have an infection preventing you from receiving chemotherapy. We treated you empirically for PJP pneumonia with Bactrim. You required a lot of oxygen initially, but this was titrated down as the pneumonia improved. You experienced an adverse effect of bactrim, high potassium levels in the blood, and were transitioned to an alternative antibiotic, atovaquone. You will continue atovaquone every 12 hours until February 13 and continue taking prednisone 20 mg daily until 2/13. Then you can resume bactrim prophylaxis with monitoring your potassium levels. Return to the hospital if you experience fever, chest pain, shortness of breath, palpitations, loss of consciousness, abdominal pain, diarrhea.     PRINCIPAL DISCHARGE DIAGNOSIS  Diagnosis: DLBCL (diffuse large B cell lymphoma)  Assessment and Plan of Treatment: maintain counts, remain free from infection. Notify MD and report to ER for any temperature greater than or equal to 100.4 degrees, intractable nausea, vomiting, diarrhea, or uncontrolled bleeding.        SECONDARY DISCHARGE DIAGNOSES  Diagnosis: Pneumonia, pneumocystis  Assessment and Plan of Treatment: You came to the hospital with fever, but were found to have an infection preventing you from receiving chemotherapy. We treated you empirically for PJP pneumonia with Bactrim. You required a lot of oxygen initially, but this was titrated down as the pneumonia improved. You experienced an adverse effect of bactrim, high potassium levels in the blood, and were transitioned to an alternative antibiotic, atovaquone. You will continue atovaquone every 12 hours until February 13 and continue taking prednisone 20 mg daily until 2/13.  On 2/14, you can take prednisone 10 mg daily and resume bactrim prophylaxis 1 tablet daily with monitoring your potassium levels and tacrolimus on 2/14. Please do not take lisinopril until you follow up with your primary care doctor and kidney specialist. Return to the hospital if you experience fever, chest pain, shortness of breath, palpitations, loss of consciousness, abdominal pain, diarrhea.     PRINCIPAL DISCHARGE DIAGNOSIS  Diagnosis: DLBCL (diffuse large B cell lymphoma)  Assessment and Plan of Treatment: maintain counts, remain free from infection. Notify MD and report to ER for any temperature greater than or equal to 100.4 degrees, intractable nausea, vomiting, diarrhea, or uncontrolled bleeding.        SECONDARY DISCHARGE DIAGNOSES  Diagnosis: Pneumonia, pneumocystis  Assessment and Plan of Treatment: You came to the hospital with fever, but were found to have an infection preventing you from receiving chemotherapy. We treated you empirically for PJP pneumonia with Bactrim. You required a lot of oxygen initially, but this was titrated down as the pneumonia improved. You experienced an adverse effect of bactrim, high potassium levels in the blood, and were transitioned to an alternative antibiotic, atovaquone. You will continue atovaquone every 12 hours until February 13 and continue taking prednisone 20 mg daily until 2/13.  On 2/14, you can take prednisone 10 mg daily and resume bactrim prophylaxis 1 tablet daily with monitoring your potassium levels and tacrolimus on 2/14. Please do not take lisinopril until you follow up with your primary care doctor and kidney specialist. Return to the hospital if you experience fever, chest pain, shortness of breath, palpitations, loss of consciousness, abdominal pain, diarrhea.    Diagnosis: Diabetes mellitus, type 2  Assessment and Plan of Treatment: You experienced hyperglycemia while on prednisone for PJP pneumonia. While on the steroid taper your insulin needs to be titrated closely at rehab.

## 2025-01-21 NOTE — CONSULT NOTE ADULT - SUBJECTIVE AND OBJECTIVE BOX
Patient is a 69y old  Male who presents with a chief complaint of "For chemo" (21 Jan 2025 05:20)    HPI:  M 68 y/o M PMHx renal transplant 2012 (2/2 DM, s/p left nephrectomy), peripheral neuropathy, hypothyroidism, retroperitoneal fibrosis, HTN, HLD, GERD, anxiety/depression, ANGELIKA and recent admission at [facility] for sacral osteomyelitis and ESBL.coli urosepsis discharged on 12/18/24 to rehab. While admitted to [facility] was noted to have hypercalcemia and liver masses which were biopsied on 12/16/24, biopsy revealed DLBCL. Patient received R mini CHOP on 12/28 now admitted for cycle #2 Rmini CHOP, upon admission patient is febrile will hold chemotherapy today.  (17 Jan 2025 12:09)      Imaging reviewed showed:  HEAD CT - No acute findings  CAP CT - No acute findings  C SPINE CT - No acute findings    REVIEW OF SYSTEMS  Constitutional - No fever, No weight loss, No fatigue  HEENT - No eye pain, No visual disturbances, No difficulty hearing, No tinnitus, No vertigo, No neck pain  Respiratory - No cough, No wheezing, No shortness of breath  Cardiovascular - No chest pain, No palpitations  Gastrointestinal - No abdominal pain, No nausea, No vomiting, No diarrhea, No constipation  Genitourinary - No dysuria, No frequency, No hematuria, No incontinence  Neurological - No headaches, No memory loss, No loss of strength, No numbness, No tremors  Skin - No itching, No rashes, No lesions   Endocrine - No temperature intolerance  Musculoskeletal - No joint pain, No joint swelling, No muscle pain  Psychiatric - No depression, No anxiety    VITALS  T(C): 37.4 (01-21-25 @ 09:00), Max: 38.4 (01-21-25 @ 05:48)  HR: 77 (01-21-25 @ 09:00) (72 - 89)  BP: 145/66 (01-21-25 @ 09:00) (127/68 - 158/62)  RR: 18 (01-21-25 @ 09:00) (18 - 20)  SpO2: 94% (01-21-25 @ 09:00) (92% - 94%)  Wt(kg): --    PAST MEDICAL & SURGICAL HISTORY  Diabetes Mellitus Type II    ESRD on Dialysis    GERD (gastroesophageal reflux disease)    Depression    Hypothyroidism    Hyperlipidemia    Kidney transplanted    Hypertension    ANGELIKA (obstructive sleep apnea)    Peripheral neuropathy    Shoulder fracture    Hypothyroidism    History of renal transplant    DLBCL (diffuse large B cell lymphoma)    Infectious disease    Type 2 diabetes mellitus    Hypothyroidism    Transplanted kidney    History of colonoscopy    S/P kidney transplant    S/P cholecystectomy    Retroperitoneal fibrosis    AV fistula    S/P right cataract extraction    S/P left cataract extraction    H/O unilateral nephrectomy        SOCIAL HISTORY - as per documentation/history  Smoking - None  EtOH - None  Drugs - None    FUNCTIONAL HISTORY  Lives   Independent    CURRENT FUNCTIONAL STATUS      FAMILY HISTORY   MI (myocardial infarction)    Family history of obesity (Sibling)        RECENT LABS - Reviewed  CBC Full  -  ( 20 Jan 2025 06:57 )  WBC Count : 7.31 K/uL  RBC Count : 2.58 M/uL  Hemoglobin : 7.5 g/dL  Hematocrit : 23.9 %  Platelet Count - Automated : 485 K/uL  Mean Cell Volume : 92.6 fl  Mean Cell Hemoglobin : 29.1 pg  Mean Cell Hemoglobin Concentration : 31.4 g/dL  Auto Neutrophil # : 6.58 K/uL  Auto Lymphocyte # : 0.18 K/uL  Auto Monocyte # : 0.35 K/uL  Auto Eosinophil # : 0.03 K/uL  Auto Basophil # : 0.03 K/uL  Auto Neutrophil % : 90.0 %  Auto Lymphocyte % : 2.5 %  Auto Monocyte % : 4.8 %  Auto Eosinophil % : 0.4 %  Auto Basophil % : 0.4 %    01-20    134[L]  |  101  |  32[H]  ----------------------------<  52[LL]  4.8   |  23  |  1.15    Ca    8.9      20 Jan 2025 06:57  Phos  2.0     01-20  Mg     2.0     01-20    TPro  5.7[L]  /  Alb  2.6[L]  /  TBili  0.2  /  DBili  x   /  AST  51[H]  /  ALT  62[H]  /  AlkPhos  95  01-20    Urinalysis Basic - ( 20 Jan 2025 06:57 )    Color: x / Appearance: x / SG: x / pH: x  Gluc: 52 mg/dL / Ketone: x  / Bili: x / Urobili: x   Blood: x / Protein: x / Nitrite: x   Leuk Esterase: x / RBC: x / WBC x   Sq Epi: x / Non Sq Epi: x / Bacteria: x        ALLERGIES  No Known Allergies      MEDICATIONS   acetaminophen     Tablet .. 650 milliGRAM(s) Oral every 6 hours PRN  alteplase for catheter clearance 2 milliGRAM(s) Catheter once  alteplase for catheter clearance 2 milliGRAM(s) Catheter once  amLODIPine   Tablet 10 milliGRAM(s) Oral daily  buPROPion XL (24-Hour) . 300 milliGRAM(s) Oral daily  carvedilol 12.5 milliGRAM(s) Oral every 12 hours  chlorhexidine 4% Liquid 1 Application(s) Topical <User Schedule>  cloNIDine 0.1 milliGRAM(s) Oral three times a day  dextrose 5%. 1000 milliLiter(s) IV Continuous <Continuous>  dextrose 5%. 1000 milliLiter(s) IV Continuous <Continuous>  dextrose 50% Injectable 25 Gram(s) IV Push once  dextrose 50% Injectable 12.5 Gram(s) IV Push once  dextrose 50% Injectable 25 Gram(s) IV Push once  dextrose Oral Gel 15 Gram(s) Oral once PRN  enoxaparin Injectable 40 milliGRAM(s) SubCutaneous <User Schedule>  ertapenem  IVPB      ertapenem  IVPB 1000 milliGRAM(s) IV Intermittent every 24 hours  escitalopram 10 milliGRAM(s) Oral daily  glucagon  Injectable 1 milliGRAM(s) IntraMuscular once  glucagon  Injectable 1 milliGRAM(s) IntraMuscular once  hydrALAZINE 100 milliGRAM(s) Oral every 8 hours  insulin glargine Injectable (LANTUS) 38 Unit(s) SubCutaneous at bedtime  insulin lispro (ADMELOG) corrective regimen sliding scale   SubCutaneous three times a day before meals  insulin lispro (ADMELOG) corrective regimen sliding scale   SubCutaneous at bedtime  insulin lispro Injectable (ADMELOG) 8 Unit(s) SubCutaneous before breakfast  insulin lispro Injectable (ADMELOG) 8 Unit(s) SubCutaneous before lunch  insulin lispro Injectable (ADMELOG) 8 Unit(s) SubCutaneous before dinner  lactulose Syrup 15 Gram(s) Oral <User Schedule>  lactulose Syrup 10 Gram(s) Oral every 6 hours  levothyroxine 88 MICROGram(s) Oral daily  lisinopril 10 milliGRAM(s) Oral daily  nystatin    Suspension 620681 Unit(s) Oral every 6 hours  pantoprazole   Suspension 40 milliGRAM(s) Oral daily  polyethylene glycol 3350 17 Gram(s) Oral at bedtime  polyethylene glycol 3350 17 Gram(s) Oral daily PRN  predniSONE   Tablet 5 milliGRAM(s) Oral two times a day  rosuvastatin 10 milliGRAM(s) Oral at bedtime  senna 2 Tablet(s) Oral at bedtime  sodium chloride 0.9% lock flush 10 milliLiter(s) IV Push every 1 hour PRN  tacrolimus 1 milliGRAM(s) Oral <User Schedule>  tacrolimus 1 milliGRAM(s) Oral at bedtime      ----------------------------------------------------------------------------------------  PHYSICAL EXAM  Constitutional - NAD, Comfortable  HEENT - NCAT, EOMI  Neck - Supple, No limited ROM  Chest - Breathing comfortably, No wheezing  Cardiovascular - S1S2   Abdomen - Soft   Extremities - No C/C/E, No calf tenderness   Neurologic Exam -                    Cognitive - Awake, Alert, AAO to self, place, date, year, situation     Communication - Fluent, No dysarthria     Cranial Nerves - CN 2-12 intact     Motor - No focal deficits                    LEFT    UE - ShAB 5/5, EF 5/5, EE 5/5, WE 5/5,  5/5                    RIGHT UE - ShAB 5/5, EF 5/5, EE 5/5, WE 5/5,  5/5                    LEFT    LE - HF 5/5, KE 5/5, DF 5/5, PF 5/5                    RIGHT LE - HF 5/5, KE 5/5, DF 5/5, PF 5/5        Sensory - Intact to LT     Reflexes - DTR Intact, No primitive reflexive     Coordination - FTN intact     OculoVestibular - No saccades, No nystagmus, VOR         Balance - WNL Static  Psychiatric - Mood stable, Affect WNL  ----------------------------------------------------------------------------------------  ASSESSMENT/PLAN  69yMale with functional deficits after  Pain - Tylenol  DVT PPX - SCDs  Rehab -    Recommend ACUTE inpatient rehabilitation for the functional deficits consisting of 3 hours of therapy/day & 24 hour RN/daily PMR physician for comorbid medical management. Patient will be able to tolerate 3 hours a day.   Recommend [facility], patient DOES NOT meet acute inpatient rehabilitation criteria. Patient needs a more prolonged stay to achieve transition to community.    Expect patient to achieve functional goals for DC HOME with OUTPATIENT   Expect patient to achieve functional goals for DC HOME with HOME CARE   Follow up with CONCUSSION PROGRAM - Call [phone] for an appointment    Will continue to follow. Functional progress will determine ongoing rehab dispo recommendations, which may change.    Continue bedside therapy as well as OOB throughout the day with mobilization by staff to maintain cardiopulmonary function and prevention of secondary complications related to debility.  Patient is a 69y old  Male who presents with a chief complaint of "For chemo" (21 Jan 2025 05:20)    HPI:  M 68 y/o M PMHx renal transplant 2012 (2/2 DM, s/p left nephrectomy), peripheral neuropathy, hypothyroidism, retroperitoneal fibrosis, HTN, HLD, GERD, anxiety/depression, ANGELIKA and recent admission at [facility] for sacral osteomyelitis and ESBL.coli urosepsis discharged on 12/18/24 to rehab. While admitted to [facility] was noted to have hypercalcemia and liver masses which were biopsied on 12/16/24, biopsy revealed DLBCL. Patient received R mini CHOP on 12/28 now admitted for cycle #2 Rmini CHOP, upon admission patient was febrile so chemotherapy was held.     He was seen by PT on 1/18/25 and he declined to participate despite maximum encouragement.       Imaging reviewed showed:  HEAD CT - No acute findings  CAP CT - No acute findings  C SPINE CT - No acute findings    REVIEW OF SYSTEMS  Constitutional - No fever, No weight loss, No fatigue  HEENT - No eye pain, No visual disturbances, No difficulty hearing, No tinnitus, No vertigo, No neck pain  Respiratory - No cough, No wheezing, No shortness of breath  Cardiovascular - No chest pain, No palpitations  Gastrointestinal - No abdominal pain, No nausea, No vomiting, No diarrhea, No constipation  Genitourinary - No dysuria, No frequency, No hematuria, No incontinence  Neurological - No headaches, No memory loss, No loss of strength, No numbness, No tremors  Skin - No itching, No rashes, No lesions   Endocrine - No temperature intolerance  Musculoskeletal - No joint pain, No joint swelling, No muscle pain  Psychiatric - No depression, No anxiety    VITALS  T(C): 37.4 (01-21-25 @ 09:00), Max: 38.4 (01-21-25 @ 05:48)  HR: 77 (01-21-25 @ 09:00) (72 - 89)  BP: 145/66 (01-21-25 @ 09:00) (127/68 - 158/62)  RR: 18 (01-21-25 @ 09:00) (18 - 20)  SpO2: 94% (01-21-25 @ 09:00) (92% - 94%)  Wt(kg): --    PAST MEDICAL & SURGICAL HISTORY  Diabetes Mellitus Type II    ESRD on Dialysis    GERD (gastroesophageal reflux disease)    Depression    Hypothyroidism    Hyperlipidemia    Kidney transplanted    Hypertension    ANGELIKA (obstructive sleep apnea)    Peripheral neuropathy    Shoulder fracture    Hypothyroidism    History of renal transplant    DLBCL (diffuse large B cell lymphoma)    Infectious disease    Type 2 diabetes mellitus    Hypothyroidism    Transplanted kidney    History of colonoscopy    S/P kidney transplant    S/P cholecystectomy    Retroperitoneal fibrosis    AV fistula    S/P right cataract extraction    S/P left cataract extraction    H/O unilateral nephrectomy    SOCIAL HISTORY - as per documentation/history    FUNCTIONAL HISTORY  Lives   Independent    CURRENT FUNCTIONAL STATUS    FAMILY HISTORY   MI (myocardial infarction)  Family history of obesity (Sibling)    RECENT LABS - Reviewed  CBC Full  -  ( 20 Jan 2025 06:57 )  WBC Count : 7.31 K/uL  RBC Count : 2.58 M/uL  Hemoglobin : 7.5 g/dL  Hematocrit : 23.9 %  Platelet Count - Automated : 485 K/uL  Mean Cell Volume : 92.6 fl  Mean Cell Hemoglobin : 29.1 pg  Mean Cell Hemoglobin Concentration : 31.4 g/dL  Auto Neutrophil # : 6.58 K/uL  Auto Lymphocyte # : 0.18 K/uL  Auto Monocyte # : 0.35 K/uL  Auto Eosinophil # : 0.03 K/uL  Auto Basophil # : 0.03 K/uL  Auto Neutrophil % : 90.0 %  Auto Lymphocyte % : 2.5 %  Auto Monocyte % : 4.8 %  Auto Eosinophil % : 0.4 %  Auto Basophil % : 0.4 %    01-20    134[L]  |  101  |  32[H]  ----------------------------<  52[LL]  4.8   |  23  |  1.15    Ca    8.9      20 Jan 2025 06:57  Phos  2.0     01-20  Mg     2.0     01-20    TPro  5.7[L]  /  Alb  2.6[L]  /  TBili  0.2  /  DBili  x   /  AST  51[H]  /  ALT  62[H]  /  AlkPhos  95  01-20    Urinalysis Basic - ( 20 Jan 2025 06:57 )    Color: x / Appearance: x / SG: x / pH: x  Gluc: 52 mg/dL / Ketone: x  / Bili: x / Urobili: x   Blood: x / Protein: x / Nitrite: x   Leuk Esterase: x / RBC: x / WBC x   Sq Epi: x / Non Sq Epi: x / Bacteria: x    ALLERGIES  No Known Allergies    MEDICATIONS   acetaminophen     Tablet .. 650 milliGRAM(s) Oral every 6 hours PRN  alteplase for catheter clearance 2 milliGRAM(s) Catheter once  alteplase for catheter clearance 2 milliGRAM(s) Catheter once  amLODIPine   Tablet 10 milliGRAM(s) Oral daily  buPROPion XL (24-Hour) . 300 milliGRAM(s) Oral daily  carvedilol 12.5 milliGRAM(s) Oral every 12 hours  chlorhexidine 4% Liquid 1 Application(s) Topical <User Schedule>  cloNIDine 0.1 milliGRAM(s) Oral three times a day  dextrose 5%. 1000 milliLiter(s) IV Continuous <Continuous>  dextrose 5%. 1000 milliLiter(s) IV Continuous <Continuous>  dextrose 50% Injectable 25 Gram(s) IV Push once  dextrose 50% Injectable 12.5 Gram(s) IV Push once  dextrose 50% Injectable 25 Gram(s) IV Push once  dextrose Oral Gel 15 Gram(s) Oral once PRN  enoxaparin Injectable 40 milliGRAM(s) SubCutaneous <User Schedule>  ertapenem  IVPB      ertapenem  IVPB 1000 milliGRAM(s) IV Intermittent every 24 hours  escitalopram 10 milliGRAM(s) Oral daily  glucagon  Injectable 1 milliGRAM(s) IntraMuscular once  glucagon  Injectable 1 milliGRAM(s) IntraMuscular once  hydrALAZINE 100 milliGRAM(s) Oral every 8 hours  insulin glargine Injectable (LANTUS) 38 Unit(s) SubCutaneous at bedtime  insulin lispro (ADMELOG) corrective regimen sliding scale   SubCutaneous three times a day before meals  insulin lispro (ADMELOG) corrective regimen sliding scale   SubCutaneous at bedtime  insulin lispro Injectable (ADMELOG) 8 Unit(s) SubCutaneous before breakfast  insulin lispro Injectable (ADMELOG) 8 Unit(s) SubCutaneous before lunch  insulin lispro Injectable (ADMELOG) 8 Unit(s) SubCutaneous before dinner  lactulose Syrup 15 Gram(s) Oral <User Schedule>  lactulose Syrup 10 Gram(s) Oral every 6 hours  levothyroxine 88 MICROGram(s) Oral daily  lisinopril 10 milliGRAM(s) Oral daily  nystatin    Suspension 654216 Unit(s) Oral every 6 hours  pantoprazole   Suspension 40 milliGRAM(s) Oral daily  polyethylene glycol 3350 17 Gram(s) Oral at bedtime  polyethylene glycol 3350 17 Gram(s) Oral daily PRN  predniSONE   Tablet 5 milliGRAM(s) Oral two times a day  rosuvastatin 10 milliGRAM(s) Oral at bedtime  senna 2 Tablet(s) Oral at bedtime  sodium chloride 0.9% lock flush 10 milliLiter(s) IV Push every 1 hour PRN  tacrolimus 1 milliGRAM(s) Oral <User Schedule>  tacrolimus 1 milliGRAM(s) Oral at bedtime  ----------------------------------------------------------------------------------------  PHYSICAL EXAM  Constitutional - NAD, Comfortable  HEENT - NCAT, EOMI  Neck - Supple, No limited ROM  Chest - Breathing comfortably, No wheezing  Cardiovascular - S1S2   Abdomen - Soft   Extremities - No C/C/E, No calf tenderness   Neurologic Exam -                    Cognitive - Awake, Alert, AAO to self, place, date, year, situation     Communication - Fluent, No dysarthria     Cranial Nerves - CN 2-12 intact     Motor - No focal deficits                    LEFT    UE - ShAB 5/5, EF 5/5, EE 5/5, WE 5/5,  5/5                    RIGHT UE - ShAB 5/5, EF 5/5, EE 5/5, WE 5/5,  5/5                    LEFT    LE - HF 5/5, KE 5/5, DF 5/5, PF 5/5                    RIGHT LE - HF 5/5, KE 5/5, DF 5/5, PF 5/5        Sensory - Intact to LT     Reflexes - DTR Intact, No primitive reflexive     Coordination - FTN intact     OculoVestibular - No saccades, No nystagmus, VOR         Balance - WNL Static  Psychiatric - Mood stable, Affect WNL  ----------------------------------------------------------------------------------------  ASSESSMENT/PLAN  69yMale with functional deficits after being admitted for chemotherapy and then becoming febrile.     Pain - Tylenol  DVT PPX - SCDs  Rehab -    Recommend ACUTE inpatient rehabilitation for the functional deficits consisting of 3 hours of therapy/day & 24 hour RN/daily PMR physician for comorbid medical management. Patient will be able to tolerate 3 hours a day.   Recommend [facility], patient DOES NOT meet acute inpatient rehabilitation criteria. Patient needs a more prolonged stay to achieve transition to community.    Expect patient to achieve functional goals for DC HOME with OUTPATIENT   Expect patient to achieve functional goals for DC HOME with HOME CARE   Follow up with CONCUSSION PROGRAM - Call [phone] for an appointment    Will continue to follow. Functional progress will determine ongoing rehab dispo recommendations, which may change.    Continue bedside therapy as well as OOB throughout the day with mobilization by staff to maintain cardiopulmonary function and prevention of secondary complications related to debility.  Patient is a 69y old  Male who presents with a chief complaint of "For chemo" (21 Jan 2025 05:20)    HPI:  M 70 y/o M PMHx renal transplant 2012 (2/2 DM, s/p left nephrectomy), peripheral neuropathy, hypothyroidism, retroperitoneal fibrosis, HTN, HLD, GERD, anxiety/depression, ANGELIKA and recent admission at [facility] for sacral osteomyelitis and ESBL.coli urosepsis discharged on 12/18/24 to rehab. While admitted to [facility] was noted to have hypercalcemia and liver masses which were biopsied on 12/16/24, biopsy revealed DLBCL. Patient received R mini CHOP on 12/28 now admitted for cycle #2 Rmini CHOP, upon admission patient was febrile so chemotherapy was held. He was found to have urosepsis with Klebsiella ESBL (Zosyn sensitive) and nasal corona virus. His current rehab facility will not allow for transport to outpatient oncology to treat his lymphoma.    He was seen by PT on 1/18/25 and he declined to participate despite maximum encouragement.     Imaging reviewed showed:  HEAD CT - No acute findings  CAP CT - No acute findings  C SPINE CT - No acute findings    REVIEW OF SYSTEMS  Constitutional - No fever, No weight loss, No fatigue  HEENT - No eye pain, No visual disturbances, No difficulty hearing, No tinnitus, No vertigo, No neck pain  Respiratory - No cough, No wheezing, No shortness of breath  Cardiovascular - No chest pain, No palpitations  Gastrointestinal - No abdominal pain, No nausea, No vomiting, No diarrhea, No constipation  Genitourinary - No dysuria, No frequency, No hematuria, No incontinence  Neurological - No headaches, No memory loss, No loss of strength, No numbness, No tremors  Skin - No itching, No rashes, No lesions   Endocrine - No temperature intolerance  Musculoskeletal - No joint pain, No joint swelling, No muscle pain  Psychiatric - No depression, No anxiety    VITALS  T(C): 37.4 (01-21-25 @ 09:00), Max: 38.4 (01-21-25 @ 05:48)  HR: 77 (01-21-25 @ 09:00) (72 - 89)  BP: 145/66 (01-21-25 @ 09:00) (127/68 - 158/62)  RR: 18 (01-21-25 @ 09:00) (18 - 20)  SpO2: 94% (01-21-25 @ 09:00) (92% - 94%)  Wt(kg): --    PAST MEDICAL & SURGICAL HISTORY  Diabetes Mellitus Type II    ESRD on Dialysis    GERD (gastroesophageal reflux disease)    Depression    Hypothyroidism    Hyperlipidemia    Kidney transplanted    Hypertension    ANGELIKA (obstructive sleep apnea)    Peripheral neuropathy    Shoulder fracture    Hypothyroidism    History of renal transplant    DLBCL (diffuse large B cell lymphoma)    Infectious disease    Type 2 diabetes mellitus    Hypothyroidism    Transplanted kidney    History of colonoscopy    S/P kidney transplant    S/P cholecystectomy    Retroperitoneal fibrosis    AV fistula    S/P right cataract extraction    S/P left cataract extraction    H/O unilateral nephrectomy    SOCIAL HISTORY - as per documentation/history  His family includes: wife (Ludmila), daughter (Luz Marina) and son (Jose J).    FUNCTIONAL HISTORY  Lives   Independent    CURRENT FUNCTIONAL STATUS    FAMILY HISTORY   MI (myocardial infarction)  Family history of obesity (Sibling)    RECENT LABS - Reviewed  CBC Full  -  ( 20 Jan 2025 06:57 )  WBC Count : 7.31 K/uL  RBC Count : 2.58 M/uL  Hemoglobin : 7.5 g/dL  Hematocrit : 23.9 %  Platelet Count - Automated : 485 K/uL  Mean Cell Volume : 92.6 fl  Mean Cell Hemoglobin : 29.1 pg  Mean Cell Hemoglobin Concentration : 31.4 g/dL  Auto Neutrophil # : 6.58 K/uL  Auto Lymphocyte # : 0.18 K/uL  Auto Monocyte # : 0.35 K/uL  Auto Eosinophil # : 0.03 K/uL  Auto Basophil # : 0.03 K/uL  Auto Neutrophil % : 90.0 %  Auto Lymphocyte % : 2.5 %  Auto Monocyte % : 4.8 %  Auto Eosinophil % : 0.4 %  Auto Basophil % : 0.4 %    01-20    134[L]  |  101  |  32[H]  ----------------------------<  52[LL]  4.8   |  23  |  1.15    Ca    8.9      20 Jan 2025 06:57  Phos  2.0     01-20  Mg     2.0     01-20    TPro  5.7[L]  /  Alb  2.6[L]  /  TBili  0.2  /  DBili  x   /  AST  51[H]  /  ALT  62[H]  /  AlkPhos  95  01-20    Urinalysis Basic - ( 20 Jan 2025 06:57 )    Color: x / Appearance: x / SG: x / pH: x  Gluc: 52 mg/dL / Ketone: x  / Bili: x / Urobili: x   Blood: x / Protein: x / Nitrite: x   Leuk Esterase: x / RBC: x / WBC x   Sq Epi: x / Non Sq Epi: x / Bacteria: x    ALLERGIES  No Known Allergies    MEDICATIONS   acetaminophen     Tablet .. 650 milliGRAM(s) Oral every 6 hours PRN  alteplase for catheter clearance 2 milliGRAM(s) Catheter once  alteplase for catheter clearance 2 milliGRAM(s) Catheter once  amLODIPine   Tablet 10 milliGRAM(s) Oral daily  buPROPion XL (24-Hour) . 300 milliGRAM(s) Oral daily  carvedilol 12.5 milliGRAM(s) Oral every 12 hours  chlorhexidine 4% Liquid 1 Application(s) Topical <User Schedule>  cloNIDine 0.1 milliGRAM(s) Oral three times a day  dextrose 5%. 1000 milliLiter(s) IV Continuous <Continuous>  dextrose 5%. 1000 milliLiter(s) IV Continuous <Continuous>  dextrose 50% Injectable 25 Gram(s) IV Push once  dextrose 50% Injectable 12.5 Gram(s) IV Push once  dextrose 50% Injectable 25 Gram(s) IV Push once  dextrose Oral Gel 15 Gram(s) Oral once PRN  enoxaparin Injectable 40 milliGRAM(s) SubCutaneous <User Schedule>  ertapenem  IVPB      ertapenem  IVPB 1000 milliGRAM(s) IV Intermittent every 24 hours  escitalopram 10 milliGRAM(s) Oral daily  glucagon  Injectable 1 milliGRAM(s) IntraMuscular once  glucagon  Injectable 1 milliGRAM(s) IntraMuscular once  hydrALAZINE 100 milliGRAM(s) Oral every 8 hours  insulin glargine Injectable (LANTUS) 38 Unit(s) SubCutaneous at bedtime  insulin lispro (ADMELOG) corrective regimen sliding scale   SubCutaneous three times a day before meals  insulin lispro (ADMELOG) corrective regimen sliding scale   SubCutaneous at bedtime  insulin lispro Injectable (ADMELOG) 8 Unit(s) SubCutaneous before breakfast  insulin lispro Injectable (ADMELOG) 8 Unit(s) SubCutaneous before lunch  insulin lispro Injectable (ADMELOG) 8 Unit(s) SubCutaneous before dinner  lactulose Syrup 15 Gram(s) Oral <User Schedule>  lactulose Syrup 10 Gram(s) Oral every 6 hours  levothyroxine 88 MICROGram(s) Oral daily  lisinopril 10 milliGRAM(s) Oral daily  nystatin    Suspension 699186 Unit(s) Oral every 6 hours  pantoprazole   Suspension 40 milliGRAM(s) Oral daily  polyethylene glycol 3350 17 Gram(s) Oral at bedtime  polyethylene glycol 3350 17 Gram(s) Oral daily PRN  predniSONE   Tablet 5 milliGRAM(s) Oral two times a day  rosuvastatin 10 milliGRAM(s) Oral at bedtime  senna 2 Tablet(s) Oral at bedtime  sodium chloride 0.9% lock flush 10 milliLiter(s) IV Push every 1 hour PRN  tacrolimus 1 milliGRAM(s) Oral <User Schedule>  tacrolimus 1 milliGRAM(s) Oral at bedtime  ----------------------------------------------------------------------------------------  PHYSICAL EXAM  Constitutional - NAD, Comfortable  HEENT - NCAT, EOMI  Neck - Supple, No limited ROM  Chest - Breathing comfortably, No wheezing  Cardiovascular - S1S2   Abdomen - Soft   Extremities - No C/C/E, No calf tenderness   Neurologic Exam -                    Cognitive - Awake, Alert, AAO to self, place, date, year, situation     Communication - Fluent, No dysarthria     Cranial Nerves - CN 2-12 intact     Motor - No focal deficits                    LEFT    UE - ShAB 5/5, EF 5/5, EE 5/5, WE 5/5,  5/5                    RIGHT UE - ShAB 5/5, EF 5/5, EE 5/5, WE 5/5,  5/5                    LEFT    LE - HF 5/5, KE 5/5, DF 5/5, PF 5/5                    RIGHT LE - HF 5/5, KE 5/5, DF 5/5, PF 5/5        Sensory - Intact to LT     Reflexes - DTR Intact, No primitive reflexive     Coordination - FTN intact     OculoVestibular - No saccades, No nystagmus, VOR         Balance - WNL Static  Psychiatric - Mood stable, Affect WNL  ----------------------------------------------------------------------------------------  ASSESSMENT/PLAN  69yMale with functional deficits after urosepsis with Klebsiella ESBL (Zosyn sensitive) and nasal corona virus.     Pain - Tylenol  DVT PPX - SCDs  Rehab -    Recommend ACUTE inpatient rehabilitation for the functional deficits consisting of 3 hours of therapy/day & 24 hour RN/daily PMR physician for comorbid medical management. Patient will be able to tolerate 3 hours a day.   Recommend MILO, patient DOES NOT meet acute inpatient rehabilitation criteria. Patient needs a more prolonged stay to achieve transition to community.    Expect patient to achieve functional goals for DC HOME with OUTPATIENT   Expect patient to achieve functional goals for DC HOME with HOME CARE   Follow up with CONCUSSION PROGRAM - Call [phone] for an appointment    Will continue to follow. Functional progress will determine ongoing rehab dispo recommendations, which may change.    Continue bedside therapy as well as OOB throughout the day with mobilization by staff to maintain cardiopulmonary function and prevention of secondary complications related to debility.  Patient is a 69y old  Male who presents with a chief complaint of "For chemo" (21 Jan 2025 05:20)    HPI:  M 68 y/o M PMHx renal transplant 2012 (2/2 DM, s/p left nephrectomy), peripheral neuropathy, hypothyroidism, retroperitoneal fibrosis, HTN, HLD, GERD, anxiety/depression, ANGELIKA and recent admission at [facility] for sacral osteomyelitis and ESBL.coli urosepsis discharged on 12/18/24 to rehab. While admitted to [place] was noted to have hypercalcemia and liver masses which were biopsied on 12/16/24, biopsy revealed DLBCL. Patient received R mini CHOP on 12/28 now admitted for cycle #2 Rmini CHOP, upon admission patient was febrile so chemotherapy was held. He was found to have urosepsis with Klebsiella ESBL (Zosyn sensitive) and nasal corona virus. Plan by primary team includes discussing treatment  with Tafa/Rev since his current rehab facility will not allow for transport to outpatient oncology to treat his lymphoma.     He was seen by PT on 1/18/25 and he declined to participate despite maximum encouragement.     Imaging reviewed showed:  HEAD CT - No acute findings  CAP CT - No acute findings  C SPINE CT - No acute findings    REVIEW OF SYSTEMS  Constitutional - No fever, No weight loss, No fatigue  HEENT - No eye pain, No visual disturbances, No difficulty hearing, No tinnitus, No vertigo, No neck pain  Respiratory - No cough, No wheezing, No shortness of breath  Cardiovascular - No chest pain, No palpitations  Gastrointestinal - No abdominal pain, No nausea, No vomiting, No diarrhea, No constipation  Genitourinary - No dysuria, No frequency, No hematuria, No incontinence  Neurological - No headaches, No memory loss, No loss of strength, No numbness, No tremors  Skin - No itching, No rashes, No lesions   Endocrine - No temperature intolerance  Musculoskeletal - No joint pain, No joint swelling, No muscle pain  Psychiatric - No depression, No anxiety    VITALS  T(C): 37.4 (01-21-25 @ 09:00), Max: 38.4 (01-21-25 @ 05:48)  HR: 77 (01-21-25 @ 09:00) (72 - 89)  BP: 145/66 (01-21-25 @ 09:00) (127/68 - 158/62)  RR: 18 (01-21-25 @ 09:00) (18 - 20)  SpO2: 94% (01-21-25 @ 09:00) (92% - 94%)  Wt(kg): --    PAST MEDICAL & SURGICAL HISTORY  Diabetes Mellitus Type II    ESRD on Dialysis    GERD (gastroesophageal reflux disease)    Depression    Hypothyroidism    Hyperlipidemia    Kidney transplanted    Hypertension    ANGELIKA (obstructive sleep apnea)    Peripheral neuropathy    Shoulder fracture    Hypothyroidism    History of renal transplant    DLBCL (diffuse large B cell lymphoma)    Infectious disease    Type 2 diabetes mellitus    Hypothyroidism    Transplanted kidney    History of colonoscopy    S/P kidney transplant    S/P cholecystectomy    Retroperitoneal fibrosis    AV fistula    S/P right cataract extraction    S/P left cataract extraction    H/O unilateral nephrectomy    SOCIAL HISTORY - as per documentation/history  His family includes: wife (Ludmila), daughter (Luz Marina) and son (Jose J).    FUNCTIONAL HISTORY  Lives   Independent    CURRENT FUNCTIONAL STATUS    FAMILY HISTORY   MI (myocardial infarction)  Family history of obesity (Sibling)    RECENT LABS - Reviewed  CBC Full  -  ( 20 Jan 2025 06:57 )  WBC Count : 7.31 K/uL  RBC Count : 2.58 M/uL  Hemoglobin : 7.5 g/dL  Hematocrit : 23.9 %  Platelet Count - Automated : 485 K/uL  Mean Cell Volume : 92.6 fl  Mean Cell Hemoglobin : 29.1 pg  Mean Cell Hemoglobin Concentration : 31.4 g/dL  Auto Neutrophil # : 6.58 K/uL  Auto Lymphocyte # : 0.18 K/uL  Auto Monocyte # : 0.35 K/uL  Auto Eosinophil # : 0.03 K/uL  Auto Basophil # : 0.03 K/uL  Auto Neutrophil % : 90.0 %  Auto Lymphocyte % : 2.5 %  Auto Monocyte % : 4.8 %  Auto Eosinophil % : 0.4 %  Auto Basophil % : 0.4 %    01-20    134[L]  |  101  |  32[H]  ----------------------------<  52[LL]  4.8   |  23  |  1.15    Ca    8.9      20 Jan 2025 06:57  Phos  2.0     01-20  Mg     2.0     01-20    TPro  5.7[L]  /  Alb  2.6[L]  /  TBili  0.2  /  DBili  x   /  AST  51[H]  /  ALT  62[H]  /  AlkPhos  95  01-20    Urinalysis Basic - ( 20 Jan 2025 06:57 )    Color: x / Appearance: x / SG: x / pH: x  Gluc: 52 mg/dL / Ketone: x  / Bili: x / Urobili: x   Blood: x / Protein: x / Nitrite: x   Leuk Esterase: x / RBC: x / WBC x   Sq Epi: x / Non Sq Epi: x / Bacteria: x    ALLERGIES  No Known Allergies    MEDICATIONS   acetaminophen     Tablet .. 650 milliGRAM(s) Oral every 6 hours PRN  alteplase for catheter clearance 2 milliGRAM(s) Catheter once  alteplase for catheter clearance 2 milliGRAM(s) Catheter once  amLODIPine   Tablet 10 milliGRAM(s) Oral daily  buPROPion XL (24-Hour) . 300 milliGRAM(s) Oral daily  carvedilol 12.5 milliGRAM(s) Oral every 12 hours  chlorhexidine 4% Liquid 1 Application(s) Topical <User Schedule>  cloNIDine 0.1 milliGRAM(s) Oral three times a day  dextrose 5%. 1000 milliLiter(s) IV Continuous <Continuous>  dextrose 5%. 1000 milliLiter(s) IV Continuous <Continuous>  dextrose 50% Injectable 25 Gram(s) IV Push once  dextrose 50% Injectable 12.5 Gram(s) IV Push once  dextrose 50% Injectable 25 Gram(s) IV Push once  dextrose Oral Gel 15 Gram(s) Oral once PRN  enoxaparin Injectable 40 milliGRAM(s) SubCutaneous <User Schedule>  ertapenem  IVPB      ertapenem  IVPB 1000 milliGRAM(s) IV Intermittent every 24 hours  escitalopram 10 milliGRAM(s) Oral daily  glucagon  Injectable 1 milliGRAM(s) IntraMuscular once  glucagon  Injectable 1 milliGRAM(s) IntraMuscular once  hydrALAZINE 100 milliGRAM(s) Oral every 8 hours  insulin glargine Injectable (LANTUS) 38 Unit(s) SubCutaneous at bedtime  insulin lispro (ADMELOG) corrective regimen sliding scale   SubCutaneous three times a day before meals  insulin lispro (ADMELOG) corrective regimen sliding scale   SubCutaneous at bedtime  insulin lispro Injectable (ADMELOG) 8 Unit(s) SubCutaneous before breakfast  insulin lispro Injectable (ADMELOG) 8 Unit(s) SubCutaneous before lunch  insulin lispro Injectable (ADMELOG) 8 Unit(s) SubCutaneous before dinner  lactulose Syrup 15 Gram(s) Oral <User Schedule>  lactulose Syrup 10 Gram(s) Oral every 6 hours  levothyroxine 88 MICROGram(s) Oral daily  lisinopril 10 milliGRAM(s) Oral daily  nystatin    Suspension 125284 Unit(s) Oral every 6 hours  pantoprazole   Suspension 40 milliGRAM(s) Oral daily  polyethylene glycol 3350 17 Gram(s) Oral at bedtime  polyethylene glycol 3350 17 Gram(s) Oral daily PRN  predniSONE   Tablet 5 milliGRAM(s) Oral two times a day  rosuvastatin 10 milliGRAM(s) Oral at bedtime  senna 2 Tablet(s) Oral at bedtime  sodium chloride 0.9% lock flush 10 milliLiter(s) IV Push every 1 hour PRN  tacrolimus 1 milliGRAM(s) Oral <User Schedule>  tacrolimus 1 milliGRAM(s) Oral at bedtime  ----------------------------------------------------------------------------------------  PHYSICAL EXAM  Constitutional - NAD, Comfortable  HEENT - NCAT, EOMI  Neck - Supple, No limited ROM  Chest - Breathing comfortably, No wheezing  Cardiovascular - S1S2   Abdomen - Soft   Extremities - No C/C/E, No calf tenderness   Neurologic Exam -                    Cognitive - Awake, Alert, AAO to self, place, date, year, situation     Communication - Fluent, No dysarthria     Cranial Nerves - CN 2-12 intact     Motor - No focal deficits                    LEFT    UE - ShAB 5/5, EF 5/5, EE 5/5, WE 5/5,  5/5                    RIGHT UE - ShAB 5/5, EF 5/5, EE 5/5, WE 5/5,  5/5                    LEFT    LE - HF 5/5, KE 5/5, DF 5/5, PF 5/5                    RIGHT LE - HF 5/5, KE 5/5, DF 5/5, PF 5/5        Sensory - Intact to LT     Reflexes - DTR Intact, No primitive reflexive     Coordination - FTN intact     OculoVestibular - No saccades, No nystagmus, VOR         Balance - WNL Static  Psychiatric - Mood stable, Affect WNL  ----------------------------------------------------------------------------------------  ASSESSMENT/PLAN  69yMale with functional deficits after urosepsis with Klebsiella ESBL (Zosyn sensitive) and nasal corona virus.     Pain - Tylenol  DVT PPX - SCDs  Rehab -    Recommend ACUTE inpatient rehabilitation for the functional deficits consisting of 3 hours of therapy/day & 24 hour RN/daily PMR physician for comorbid medical management. Patient will be able to tolerate 3 hours a day.   Recommend MILO, patient DOES NOT meet acute inpatient rehabilitation criteria. Patient needs a more prolonged stay to achieve transition to community.    Expect patient to achieve functional goals for DC HOME with OUTPATIENT   Expect patient to achieve functional goals for DC HOME with HOME CARE   Follow up with CONCUSSION PROGRAM - Call [phone] for an appointment    Will continue to follow. Functional progress will determine ongoing rehab dispo recommendations, which may change.    Continue bedside therapy as well as OOB throughout the day with mobilization by staff to maintain cardiopulmonary function and prevention of secondary complications related to debility.  Patient is a 69y old  Male who presents with a chief complaint of "For chemo" (21 Jan 2025 05:20)    HPI:  M 68 y/o M PMHx renal transplant 2012 (2/2 DM, s/p left nephrectomy), peripheral neuropathy, hypothyroidism, retroperitoneal fibrosis, HTN, HLD, GERD, anxiety/depression, ANGELIKA and recent admission at [facility] for sacral osteomyelitis and ESBL.coli urosepsis discharged on 12/18/24 to rehab. While admitted to [place] was noted to have hypercalcemia and liver masses which were biopsied on 12/16/24, biopsy revealed DLBCL. Patient received R mini CHOP on 12/28 now admitted for cycle #2 Rmini CHOP, upon admission patient was febrile so chemotherapy was held. He was found to have urosepsis with Klebsiella ESBL (Zosyn sensitive) and nasal corona virus. Plan by primary team includes discussing treatment with Tafa/Rev since his current rehab facility will not allow for transport to outpatient oncology to treat his lymphoma.     He was seen by PT on 1/18/25 and he declined to participate despite maximum encouragement as per the note. He said he feels weak and he is in pain when he has to bear weight especially on the LLE.    He lives in home with his wife who has MS. He said she is unable to take care of him. His son and daughter live nearby but they work and unable to watch him. He says they stay on the first floor and there are no stairs to enter. He has a wheelchair and a walker at home. The wheelchair can not fit into the bathroom. He is unsure of when it was the last time he walked. He says he moved minimally out of his bed when in the other hospital and in rehab. He needs assistance with ADLs. He said he was treated badly at the other rehab facility and does not want to return there. He declines ever going to a nursing home. He also states that he fell many times in the past.     Endorses neuropathy in bilateral LE and UE especially in his feet.     Imaging reviewed showed:  CAP CT: BONES: Left total hip arthroplasty. Degenerative changes. Erosive changes   within the sacrum with age-indeterminate right sacral fracture. Old right   eighth and 12th rib fractures. Extensive new bilateral groundglass nodular opacities, upper lobe   predominant. The etiology is unclear, question pneumonia. Small right and trace left pleural effusion.  Right hepatic mass is not well evaluated but appears decreased in size since 12/9/2024. Known  splenic mass is poorly seen without contrast. Confluent retroperitoneal soft tissue mass is unchanged, likely   retroperitoneal fibrosis.    REVIEW OF SYSTEMS: Negative unless stated in the HPI    VITALS  T(C): 37.4 (01-21-25 @ 09:00), Max: 38.4 (01-21-25 @ 05:48)  HR: 77 (01-21-25 @ 09:00) (72 - 89)  BP: 145/66 (01-21-25 @ 09:00) (127/68 - 158/62)  RR: 18 (01-21-25 @ 09:00) (18 - 20)  SpO2: 94% (01-21-25 @ 09:00) (92% - 94%)    PAST MEDICAL & SURGICAL HISTORY  Diabetes Mellitus Type II  ESRD on Dialysis s/p kidney transplanted  GERD (gastroesophageal reflux disease)  Depression  Hypothyroidism  Hyperlipidemia  Hypertension  ANGELIKA (obstructive sleep apnea)  Peripheral neuropathy  Shoulder fracture  Hypothyroidism  DLBCL (diffuse large B cell lymphoma)  Infectious disease  S/P cholecystectomy  Retroperitoneal fibrosis  AV fistula  S/P right cataract extraction  S/P left cataract extraction  H/O unilateral nephrectomy    SOCIAL HISTORY - as per documentation/history  His family includes: wife (Ludmila), daughter (Luz Marina) and son (Jose J).    FUNCTIONAL HISTORY  - as per documentation/history    CURRENT FUNCTIONAL STATUS: He currently needs assistance with ADLs and mobility. Patient was not able to participate with PT with their evaluation.     FAMILY HISTORY   MI (myocardial infarction)  Family history of obesity (Sibling)    RECENT LABS - Reviewed  CBC Full  -  ( 20 Jan 2025 06:57 )  WBC Count : 7.31 K/uL  RBC Count : 2.58 M/uL  Hemoglobin : 7.5 g/dL  Hematocrit : 23.9 %  Platelet Count - Automated : 485 K/uL  Mean Cell Volume : 92.6 fl  Mean Cell Hemoglobin : 29.1 pg  Mean Cell Hemoglobin Concentration : 31.4 g/dL  Auto Neutrophil # : 6.58 K/uL  Auto Lymphocyte # : 0.18 K/uL  Auto Monocyte # : 0.35 K/uL  Auto Eosinophil # : 0.03 K/uL  Auto Basophil # : 0.03 K/uL  Auto Neutrophil % : 90.0 %  Auto Lymphocyte % : 2.5 %  Auto Monocyte % : 4.8 %  Auto Eosinophil % : 0.4 %  Auto Basophil % : 0.4 %    01-20    134[L]  |  101  |  32[H]  ----------------------------<  52[LL]  4.8   |  23  |  1.15    Ca    8.9      20 Jan 2025 06:57  Phos  2.0     01-20  Mg     2.0     01-20    TPro  5.7[L]  /  Alb  2.6[L]  /  TBili  0.2  /  DBili  x   /  AST  51[H]  /  ALT  62[H]  /  AlkPhos  95  01-20    Urinalysis Basic - ( 20 Jan 2025 06:57 )    Color: x / Appearance: x / SG: x / pH: x  Gluc: 52 mg/dL / Ketone: x  / Bili: x / Urobili: x   Blood: x / Protein: x / Nitrite: x   Leuk Esterase: x / RBC: x / WBC x   Sq Epi: x / Non Sq Epi: x / Bacteria: x    ALLERGIES  No Known Allergies    MEDICATIONS   acetaminophen     Tablet .. 650 milliGRAM(s) Oral every 6 hours PRN  alteplase for catheter clearance 2 milliGRAM(s) Catheter once  alteplase for catheter clearance 2 milliGRAM(s) Catheter once  amLODIPine   Tablet 10 milliGRAM(s) Oral daily  buPROPion XL (24-Hour) . 300 milliGRAM(s) Oral daily  carvedilol 12.5 milliGRAM(s) Oral every 12 hours  chlorhexidine 4% Liquid 1 Application(s) Topical <User Schedule>  cloNIDine 0.1 milliGRAM(s) Oral three times a day  dextrose 5%. 1000 milliLiter(s) IV Continuous <Continuous>  dextrose 5%. 1000 milliLiter(s) IV Continuous <Continuous>  dextrose 50% Injectable 25 Gram(s) IV Push once  dextrose 50% Injectable 12.5 Gram(s) IV Push once  dextrose 50% Injectable 25 Gram(s) IV Push once  dextrose Oral Gel 15 Gram(s) Oral once PRN  enoxaparin Injectable 40 milliGRAM(s) SubCutaneous <User Schedule>  ertapenem  IVPB      ertapenem  IVPB 1000 milliGRAM(s) IV Intermittent every 24 hours  escitalopram 10 milliGRAM(s) Oral daily  glucagon  Injectable 1 milliGRAM(s) IntraMuscular once  glucagon  Injectable 1 milliGRAM(s) IntraMuscular once  hydrALAZINE 100 milliGRAM(s) Oral every 8 hours  insulin glargine Injectable (LANTUS) 38 Unit(s) SubCutaneous at bedtime  insulin lispro (ADMELOG) corrective regimen sliding scale   SubCutaneous three times a day before meals  insulin lispro (ADMELOG) corrective regimen sliding scale   SubCutaneous at bedtime  insulin lispro Injectable (ADMELOG) 8 Unit(s) SubCutaneous before breakfast  insulin lispro Injectable (ADMELOG) 8 Unit(s) SubCutaneous before lunch  insulin lispro Injectable (ADMELOG) 8 Unit(s) SubCutaneous before dinner  lactulose Syrup 15 Gram(s) Oral <User Schedule>  lactulose Syrup 10 Gram(s) Oral every 6 hours  levothyroxine 88 MICROGram(s) Oral daily  lisinopril 10 milliGRAM(s) Oral daily  nystatin    Suspension 598836 Unit(s) Oral every 6 hours  pantoprazole   Suspension 40 milliGRAM(s) Oral daily  polyethylene glycol 3350 17 Gram(s) Oral at bedtime  polyethylene glycol 3350 17 Gram(s) Oral daily PRN  predniSONE   Tablet 5 milliGRAM(s) Oral two times a day  rosuvastatin 10 milliGRAM(s) Oral at bedtime  senna 2 Tablet(s) Oral at bedtime  sodium chloride 0.9% lock flush 10 milliLiter(s) IV Push every 1 hour PRN  tacrolimus 1 milliGRAM(s) Oral <User Schedule>  tacrolimus 1 milliGRAM(s) Oral at bedtime  ----------------------------------------------------------------------------------------  PHYSICAL EXAM  Constitutional - Frustrated, Comfortable  HEENT - NCAT  Chest - Breathing comfortably, No wheezing  Abdomen - Soft   Extremities - No calf tenderness, Able to move bilateral UE and LE greater than antigravity in the bed.   Neurologic Exam -                    Cognitive - Awake, Alert     Communication - Fluent     Sensory - Decreased to light touch in bilateral LE and UE especially in the feet  Psychiatric - Mood stable but frustrated and upset  ----------------------------------------------------------------------------------------  ASSESSMENT/PLAN  69yMale with functional deficits after urosepsis with Klebsiella ESBL (Zosyn sensitive) and nasal corona virus.     #Bilateral LE pain   -He has pain when he bears weight especially on the LLE. He has history of prior falls and prior left hip fracture s/p hip arthroplasty. He also has history of DLBCL.     Pain - Tylenol  DVT PPX - SCDs  Rehab -    Recommend ACUTE inpatient rehabilitation for the functional deficits consisting of 3 hours of therapy/day & 24 hour RN/daily PMR physician for comorbid medical management. Patient will be able to tolerate 3 hours a day.   Recommend MILO, patient DOES NOT meet acute inpatient rehabilitation criteria. Patient needs a more prolonged stay to achieve transition to community.    Expect patient to achieve functional goals for DC HOME with OUTPATIENT   Expect patient to achieve functional goals for DC HOME with HOME CARE   Follow up with CONCUSSION PROGRAM - Call [phone] for an appointment    Will continue to follow. Functional progress will determine ongoing rehab dispo recommendations, which may change.    Continue bedside therapy as well as OOB throughout the day with mobilization by staff to maintain cardiopulmonary function and prevention of secondary complications related to debility.  Patient is a 69y old  Male who presents with a chief complaint of "For chemo" (21 Jan 2025 05:20)    HPI:  M 70 y/o M PMHx renal transplant 2012 (2/2 DM, s/p left nephrectomy), peripheral neuropathy, hypothyroidism, retroperitoneal fibrosis, HTN, HLD, GERD, anxiety/depression, ANGELIKA and recent admission at [facility] for sacral osteomyelitis and ESBL.coli urosepsis discharged on 12/18/24 to rehab. While admitted to [place] was noted to have hypercalcemia and liver masses which were biopsied on 12/16/24, biopsy revealed DLBCL. Patient received R mini CHOP on 12/28 now admitted for cycle #2 Rmini CHOP, upon admission patient was febrile so chemotherapy was held. He was found to have urosepsis with Klebsiella ESBL (Zosyn sensitive) and nasal corona virus. Plan by primary team includes discussing treatment with Tafa/Rev since his current rehab facility will not allow for transport to outpatient oncology to treat his lymphoma.     He was seen by PT on 1/18/25 and he declined to participate despite maximum encouragement as per the note. He said he feels weak and he is in pain when he has to bear weight especially on the LLE.    He lives in home with his wife who has MS. He said she is unable to take care of him. His son and daughter live nearby but they work and unable to watch him. He says they stay on the first floor and there are no stairs to enter. He has a wheelchair and a walker at home. The wheelchair can not fit into the bathroom. He is unsure of when it was the last time he walked. He says he moved minimally out of his bed when in the other hospital and in rehab. He needs assistance with ADLs. He said he was treated badly at the other rehab facility and does not want to return there. He declines ever going to a nursing home. He also states that he fell many times in the past.     Endorses neuropathy in bilateral LE and UE especially in his feet.     Imaging reviewed showed:  CAP CT: BONES: Left total hip arthroplasty. Degenerative changes. Erosive changes   within the sacrum with age-indeterminate right sacral fracture. Old right   eighth and 12th rib fractures. Extensive new bilateral groundglass nodular opacities, upper lobe   predominant. The etiology is unclear, question pneumonia. Small right and trace left pleural effusion.  Right hepatic mass is not well evaluated but appears decreased in size since 12/9/2024. Known  splenic mass is poorly seen without contrast. Confluent retroperitoneal soft tissue mass is unchanged, likely   retroperitoneal fibrosis.    REVIEW OF SYSTEMS: Negative unless stated in the HPI    VITALS  T(C): 37.4 (01-21-25 @ 09:00), Max: 38.4 (01-21-25 @ 05:48)  HR: 77 (01-21-25 @ 09:00) (72 - 89)  BP: 145/66 (01-21-25 @ 09:00) (127/68 - 158/62)  RR: 18 (01-21-25 @ 09:00) (18 - 20)  SpO2: 94% (01-21-25 @ 09:00) (92% - 94%)    PAST MEDICAL & SURGICAL HISTORY  Diabetes Mellitus Type II  ESRD on Dialysis s/p kidney transplanted  GERD (gastroesophageal reflux disease)  Depression  Hypothyroidism  Hyperlipidemia  Hypertension  ANGELIKA (obstructive sleep apnea)  Peripheral neuropathy  Shoulder fracture  Hypothyroidism  DLBCL (diffuse large B cell lymphoma)  Infectious disease  S/P cholecystectomy  Retroperitoneal fibrosis  AV fistula  S/P right cataract extraction  S/P left cataract extraction  H/O unilateral nephrectomy    SOCIAL HISTORY - as per documentation/history  His family includes: wife (Ludmila), daughter (Luz Marina) and son (Jose J).    FUNCTIONAL HISTORY  - as per documentation/history    CURRENT FUNCTIONAL STATUS: He currently needs assistance with ADLs and mobility. Patient was not able to participate with PT with their evaluation.     FAMILY HISTORY   MI (myocardial infarction)  Family history of obesity (Sibling)    RECENT LABS - Reviewed  CBC Full  -  ( 20 Jan 2025 06:57 )  WBC Count : 7.31 K/uL  RBC Count : 2.58 M/uL  Hemoglobin : 7.5 g/dL  Hematocrit : 23.9 %  Platelet Count - Automated : 485 K/uL  Mean Cell Volume : 92.6 fl  Mean Cell Hemoglobin : 29.1 pg  Mean Cell Hemoglobin Concentration : 31.4 g/dL  Auto Neutrophil # : 6.58 K/uL  Auto Lymphocyte # : 0.18 K/uL  Auto Monocyte # : 0.35 K/uL  Auto Eosinophil # : 0.03 K/uL  Auto Basophil # : 0.03 K/uL  Auto Neutrophil % : 90.0 %  Auto Lymphocyte % : 2.5 %  Auto Monocyte % : 4.8 %  Auto Eosinophil % : 0.4 %  Auto Basophil % : 0.4 %    01-20    134[L]  |  101  |  32[H]  ----------------------------<  52[LL]  4.8   |  23  |  1.15    Ca    8.9      20 Jan 2025 06:57  Phos  2.0     01-20  Mg     2.0     01-20    TPro  5.7[L]  /  Alb  2.6[L]  /  TBili  0.2  /  DBili  x   /  AST  51[H]  /  ALT  62[H]  /  AlkPhos  95  01-20    Urinalysis Basic - ( 20 Jan 2025 06:57 )    Color: x / Appearance: x / SG: x / pH: x  Gluc: 52 mg/dL / Ketone: x  / Bili: x / Urobili: x   Blood: x / Protein: x / Nitrite: x   Leuk Esterase: x / RBC: x / WBC x   Sq Epi: x / Non Sq Epi: x / Bacteria: x    ALLERGIES  No Known Allergies    MEDICATIONS   acetaminophen     Tablet .. 650 milliGRAM(s) Oral every 6 hours PRN  alteplase for catheter clearance 2 milliGRAM(s) Catheter once  alteplase for catheter clearance 2 milliGRAM(s) Catheter once  amLODIPine   Tablet 10 milliGRAM(s) Oral daily  buPROPion XL (24-Hour) . 300 milliGRAM(s) Oral daily  carvedilol 12.5 milliGRAM(s) Oral every 12 hours  chlorhexidine 4% Liquid 1 Application(s) Topical <User Schedule>  cloNIDine 0.1 milliGRAM(s) Oral three times a day  dextrose 5%. 1000 milliLiter(s) IV Continuous <Continuous>  dextrose 5%. 1000 milliLiter(s) IV Continuous <Continuous>  dextrose 50% Injectable 25 Gram(s) IV Push once  dextrose 50% Injectable 12.5 Gram(s) IV Push once  dextrose 50% Injectable 25 Gram(s) IV Push once  dextrose Oral Gel 15 Gram(s) Oral once PRN  enoxaparin Injectable 40 milliGRAM(s) SubCutaneous <User Schedule>  ertapenem  IVPB      ertapenem  IVPB 1000 milliGRAM(s) IV Intermittent every 24 hours  escitalopram 10 milliGRAM(s) Oral daily  glucagon  Injectable 1 milliGRAM(s) IntraMuscular once  glucagon  Injectable 1 milliGRAM(s) IntraMuscular once  hydrALAZINE 100 milliGRAM(s) Oral every 8 hours  insulin glargine Injectable (LANTUS) 38 Unit(s) SubCutaneous at bedtime  insulin lispro (ADMELOG) corrective regimen sliding scale   SubCutaneous three times a day before meals  insulin lispro (ADMELOG) corrective regimen sliding scale   SubCutaneous at bedtime  insulin lispro Injectable (ADMELOG) 8 Unit(s) SubCutaneous before breakfast  insulin lispro Injectable (ADMELOG) 8 Unit(s) SubCutaneous before lunch  insulin lispro Injectable (ADMELOG) 8 Unit(s) SubCutaneous before dinner  lactulose Syrup 15 Gram(s) Oral <User Schedule>  lactulose Syrup 10 Gram(s) Oral every 6 hours  levothyroxine 88 MICROGram(s) Oral daily  lisinopril 10 milliGRAM(s) Oral daily  nystatin    Suspension 675506 Unit(s) Oral every 6 hours  pantoprazole   Suspension 40 milliGRAM(s) Oral daily  polyethylene glycol 3350 17 Gram(s) Oral at bedtime  polyethylene glycol 3350 17 Gram(s) Oral daily PRN  predniSONE   Tablet 5 milliGRAM(s) Oral two times a day  rosuvastatin 10 milliGRAM(s) Oral at bedtime  senna 2 Tablet(s) Oral at bedtime  sodium chloride 0.9% lock flush 10 milliLiter(s) IV Push every 1 hour PRN  tacrolimus 1 milliGRAM(s) Oral <User Schedule>  tacrolimus 1 milliGRAM(s) Oral at bedtime  ----------------------------------------------------------------------------------------  PHYSICAL EXAM  Constitutional - Frustrated, Comfortable  HEENT - NCAT  Chest - Breathing comfortably, No wheezing  Abdomen - Soft   Extremities - No calf tenderness, Able to move bilateral UE and LE greater than antigravity in the bed.   Neurologic Exam -                    Cognitive - Awake, Alert     Communication - Fluent     Sensory - Decreased to light touch in bilateral LE and UE especially in the feet  Psychiatric - Mood stable but frustrated and upset  ----------------------------------------------------------------------------------------  ASSESSMENT/PLAN  69yMale with functional deficits after urosepsis with Klebsiella ESBL (Zosyn sensitive) and nasal corona virus.     #Bilateral LE pain (Left worse than Right)  -He has pain when he bears weight especially on the LLE. He has history of prior falls and prior left hip fracture s/p hip arthroplasty. He also has history of DLBCL. I spoke to the primary team and recommend first getting a left hip x-ray to check the integrity of his left hip   arthroplasty and also to rule out pathologic fracture due to his cancer history.   -Recommend medications for pain management. Could consider standing Tylenol but primary team would need to monitor liver enzymes since his liver enzymes are mildly elevated. Lymphoma can cause bone pain which can be managed with steroids, NSAIDs and opioids. He is already on predniSONE Tablet 5 milliGRAM(s) Oral two times a day. I did not recommend NSAIDs due to his anemia and eventual plan to resume cancer-related treatment. Could consider starting with oxycodone 5 mg q6hr PRN. Once pathologic fracture is ruled out then recommend giving patient pain medication prior to PT to increase success of tolerance of therapy.     #Chemotherapy-induced peripheral neuropathy  -This can be painful for some patient and could also be contributing to his pain. Could consider Gabapentin 300 mg TID. Could also consider topical medications such as lidocaine or capsaicin cream PRN as long as he has no open wounds or skin infections.     #Difficulty with mobility and ADLs  -Patient is on enoxaparin for DVT ppx  -This is multifactorial and could be secondary to being deconditioned after minimal mobility during hospitalization, cancer-related treatment and/or potentially an issue with his left hip arthroplasty and/or possible pathologic fracture  -Recommend first getting a left hip x-ray to check the integrity of his left hip arthroplasty and also to rule out pathologic fracture due to his cancer history.   -Recommend medications for pan management as listed above listed under the Bilateral LE pain   -Unable to provide recommendations for rehab disposition due to patient not participating with therapy. I also discussed with the primary team about their plan for the patient's cancer-related treatment and once this has been decided along with the patient participating with therapy then I can reach out to my team to ask if it is possible to have concurrent treatment with rehab. Patient's wife can not take care of him at home and his kids can not always be there since they work. Patient is not interested in a nursing home. We would also need to know what the plan would be after discharge from rehab. Due to his prior history of multiple falls, he may not be safe at home by himself.   -Will continue to follow. Functional progress will determine ongoing rehab dispo recommendations, which may change.   Patient is a 69y old  Male who presents with a chief complaint of "For chemo" (21 Jan 2025 05:20)    HPI:  M 70 y/o M PMHx renal transplant 2012 (2/2 DM, s/p left nephrectomy), peripheral neuropathy, hypothyroidism, retroperitoneal fibrosis, HTN, HLD, GERD, anxiety/depression, ANGELIKA and recent admission at [facility] for sacral osteomyelitis and ESBL.coli urosepsis discharged on 12/18/24 to rehab. While admitted to [place] was noted to have hypercalcemia and liver masses which were biopsied on 12/16/24, biopsy revealed DLBCL. Patient received R mini CHOP on 12/28 now admitted for cycle #2 Rmini CHOP, upon admission patient was febrile so chemotherapy was held. He was found to have urosepsis with Klebsiella ESBL (Zosyn sensitive) and nasal corona virus. Plan by primary team includes discussing treatment with Tafa/Rev since his current rehab facility will not allow for transport to outpatient oncology to treat his lymphoma.     He was seen by PT on 1/18/25 and he declined to participate despite maximum encouragement as per the note. He said he feels weak and he is in pain when he has to bear weight especially on the LLE.    He lives in home with his wife who has MS. He said she is unable to take care of him. His son and daughter live nearby but they work and unable to watch him. He says they stay on the first floor and there are no stairs to enter. He has a wheelchair and a walker at home. The wheelchair can not fit into the bathroom. He is unsure of when it was the last time he walked. He says he moved minimally out of his bed when in the other hospital and in rehab. He needs assistance with ADLs. He said he was treated badly at the other rehab facility and does not want to return there. He declines ever going to a nursing home. He also states that he fell many times in the past.     Endorses neuropathy in bilateral LE and UE especially in his feet.     Imaging reviewed showed:  CAP CT: BONES: Left total hip arthroplasty. Degenerative changes. Erosive changes   within the sacrum with age-indeterminate right sacral fracture. Old right   eighth and 12th rib fractures. Extensive new bilateral groundglass nodular opacities, upper lobe   predominant. The etiology is unclear, question pneumonia. Small right and trace left pleural effusion.  Right hepatic mass is not well evaluated but appears decreased in size since 12/9/2024. Known  splenic mass is poorly seen without contrast. Confluent retroperitoneal soft tissue mass is unchanged, likely   retroperitoneal fibrosis.    REVIEW OF SYSTEMS: Negative unless stated in the HPI    VITALS  T(C): 37.4 (01-21-25 @ 09:00), Max: 38.4 (01-21-25 @ 05:48)  HR: 77 (01-21-25 @ 09:00) (72 - 89)  BP: 145/66 (01-21-25 @ 09:00) (127/68 - 158/62)  RR: 18 (01-21-25 @ 09:00) (18 - 20)  SpO2: 94% (01-21-25 @ 09:00) (92% - 94%)    PAST MEDICAL & SURGICAL HISTORY  Diabetes Mellitus Type II  ESRD on Dialysis s/p kidney transplanted  GERD (gastroesophageal reflux disease)  Depression  Hypothyroidism  Hyperlipidemia  Hypertension  ANGELIKA (obstructive sleep apnea)  Peripheral neuropathy  Shoulder fracture  Hypothyroidism  DLBCL (diffuse large B cell lymphoma)  Infectious disease  S/P cholecystectomy  Retroperitoneal fibrosis  AV fistula  S/P right cataract extraction  S/P left cataract extraction  H/O unilateral nephrectomy    SOCIAL HISTORY - as per documentation/history  His family includes: wife (Ludmila), daughter (Luz Marina) and son (Jose J).    FUNCTIONAL HISTORY  - as per documentation/history    CURRENT FUNCTIONAL STATUS: He currently needs assistance with ADLs and mobility. Patient was not able to participate with PT with their evaluation.     FAMILY HISTORY   MI (myocardial infarction)  Family history of obesity (Sibling)    RECENT LABS - Reviewed  CBC Full  -  ( 20 Jan 2025 06:57 )  WBC Count : 7.31 K/uL  RBC Count : 2.58 M/uL  Hemoglobin : 7.5 g/dL  Hematocrit : 23.9 %  Platelet Count - Automated : 485 K/uL  Mean Cell Volume : 92.6 fl  Mean Cell Hemoglobin : 29.1 pg  Mean Cell Hemoglobin Concentration : 31.4 g/dL  Auto Neutrophil # : 6.58 K/uL  Auto Lymphocyte # : 0.18 K/uL  Auto Monocyte # : 0.35 K/uL  Auto Eosinophil # : 0.03 K/uL  Auto Basophil # : 0.03 K/uL  Auto Neutrophil % : 90.0 %  Auto Lymphocyte % : 2.5 %  Auto Monocyte % : 4.8 %  Auto Eosinophil % : 0.4 %  Auto Basophil % : 0.4 %    01-20    134[L]  |  101  |  32[H]  ----------------------------<  52[LL]  4.8   |  23  |  1.15    Ca    8.9      20 Jan 2025 06:57  Phos  2.0     01-20  Mg     2.0     01-20    TPro  5.7[L]  /  Alb  2.6[L]  /  TBili  0.2  /  DBili  x   /  AST  51[H]  /  ALT  62[H]  /  AlkPhos  95  01-20    Urinalysis Basic - ( 20 Jan 2025 06:57 )    Color: x / Appearance: x / SG: x / pH: x  Gluc: 52 mg/dL / Ketone: x  / Bili: x / Urobili: x   Blood: x / Protein: x / Nitrite: x   Leuk Esterase: x / RBC: x / WBC x   Sq Epi: x / Non Sq Epi: x / Bacteria: x    ALLERGIES  No Known Allergies    MEDICATIONS   acetaminophen     Tablet .. 650 milliGRAM(s) Oral every 6 hours PRN  alteplase for catheter clearance 2 milliGRAM(s) Catheter once  alteplase for catheter clearance 2 milliGRAM(s) Catheter once  amLODIPine   Tablet 10 milliGRAM(s) Oral daily  buPROPion XL (24-Hour) . 300 milliGRAM(s) Oral daily  carvedilol 12.5 milliGRAM(s) Oral every 12 hours  chlorhexidine 4% Liquid 1 Application(s) Topical <User Schedule>  cloNIDine 0.1 milliGRAM(s) Oral three times a day  dextrose 5%. 1000 milliLiter(s) IV Continuous <Continuous>  dextrose 5%. 1000 milliLiter(s) IV Continuous <Continuous>  dextrose 50% Injectable 25 Gram(s) IV Push once  dextrose 50% Injectable 12.5 Gram(s) IV Push once  dextrose 50% Injectable 25 Gram(s) IV Push once  dextrose Oral Gel 15 Gram(s) Oral once PRN  enoxaparin Injectable 40 milliGRAM(s) SubCutaneous <User Schedule>  ertapenem  IVPB      ertapenem  IVPB 1000 milliGRAM(s) IV Intermittent every 24 hours  escitalopram 10 milliGRAM(s) Oral daily  glucagon  Injectable 1 milliGRAM(s) IntraMuscular once  glucagon  Injectable 1 milliGRAM(s) IntraMuscular once  hydrALAZINE 100 milliGRAM(s) Oral every 8 hours  insulin glargine Injectable (LANTUS) 38 Unit(s) SubCutaneous at bedtime  insulin lispro (ADMELOG) corrective regimen sliding scale   SubCutaneous three times a day before meals  insulin lispro (ADMELOG) corrective regimen sliding scale   SubCutaneous at bedtime  insulin lispro Injectable (ADMELOG) 8 Unit(s) SubCutaneous before breakfast  insulin lispro Injectable (ADMELOG) 8 Unit(s) SubCutaneous before lunch  insulin lispro Injectable (ADMELOG) 8 Unit(s) SubCutaneous before dinner  lactulose Syrup 15 Gram(s) Oral <User Schedule>  lactulose Syrup 10 Gram(s) Oral every 6 hours  levothyroxine 88 MICROGram(s) Oral daily  lisinopril 10 milliGRAM(s) Oral daily  nystatin    Suspension 101565 Unit(s) Oral every 6 hours  pantoprazole   Suspension 40 milliGRAM(s) Oral daily  polyethylene glycol 3350 17 Gram(s) Oral at bedtime  polyethylene glycol 3350 17 Gram(s) Oral daily PRN  predniSONE   Tablet 5 milliGRAM(s) Oral two times a day  rosuvastatin 10 milliGRAM(s) Oral at bedtime  senna 2 Tablet(s) Oral at bedtime  sodium chloride 0.9% lock flush 10 milliLiter(s) IV Push every 1 hour PRN  tacrolimus 1 milliGRAM(s) Oral <User Schedule>  tacrolimus 1 milliGRAM(s) Oral at bedtime  ----------------------------------------------------------------------------------------  PHYSICAL EXAM  Constitutional - Frustrated, Comfortable  HEENT - NCAT  Chest - Breathing comfortably, No wheezing  Abdomen - Soft   Extremities - No calf tenderness, Able to move bilateral UE and LE greater than antigravity in the bed.   Neurologic Exam -                    Cognitive - Awake, Alert     Communication - Fluent     Sensory - Decreased to light touch in bilateral LE and UE especially in the feet  Psychiatric - Mood stable but frustrated and upset  ----------------------------------------------------------------------------------------  ASSESSMENT/PLAN  69yMale with functional deficits after urosepsis with Klebsiella ESBL (Zosyn sensitive) and nasal corona virus.     #Bilateral LE pain (Left worse than Right)  -He has pain when he bears weight especially on the LLE. He has history of prior falls and prior left hip fracture s/p hip arthroplasty. He also has history of DLBCL. I spoke to the primary team and recommend first getting a left hip x-ray to check the integrity of his left hip   arthroplasty and also to rule out pathologic fracture due to his cancer history.   -Recommend medications for pain management. Could consider standing Tylenol but primary team would need to monitor liver enzymes since his liver enzymes are mildly elevated. Lymphoma can cause bone pain which can be managed with steroids, NSAIDs and opioids. He is already on predniSONE Tablet 5 milliGRAM(s) Oral two times a day. I did not recommend NSAIDs due to his anemia and eventual plan to resume cancer-related treatment. Could consider starting with oxycodone 5 mg q6hr PRN. Once pathologic fracture is ruled out then recommend giving patient pain medication prior to PT to increase success of tolerance of therapy.     #Chemotherapy-induced peripheral neuropathy  -This can be painful for some patient and could also be contributing to his pain. Could consider Gabapentin 300 mg TID. Could also consider topical medications such as lidocaine or capsaicin cream PRN as long as he has no open wounds or skin infections.     #Difficulty with mobility and ADLs  -Patient is on enoxaparin for DVT ppx  -This is multifactorial and could be secondary to being deconditioned after minimal mobility during hospitalization, cancer-related treatment and/or potentially an issue with his left hip arthroplasty and/or possible pathologic fracture  -Recommend first getting a left hip x-ray to check the integrity of his left hip arthroplasty and also to rule out pathologic fracture due to his cancer history.   -Recommend medications for pan management as listed above listed under the Bilateral LE pain   -Unable to provide recommendations for rehab disposition due to patient not participating with therapy. I also discussed with the primary team about their plan for the patient's cancer-related treatment and once this has been decided along with the patient participating with therapy then I can reach out to my team to ask if it is possible to have concurrent treatment with rehab. Patient's wife can not take care of him at home and his kids can not always be there since they work. Patient is not interested in a nursing home. We would also need to know what the plan would be after discharge from rehab. Due to his prior history of multiple falls, he may not be safe at home by himself. He also doesn't want to return to the prior rehab facility because he said he was treated badly.   -Will continue to follow. Functional progress will determine ongoing rehab dispo recommendations, which may change.   Patient is a 69y old  Male who was admitted for chemotherapy but then had his treatment held due to fevers and found to have urosepsis with Klebsiella ESBL (Zosyn sensitive) and nasal corona virus.    HPI:  M 70 y/o M PMHx renal transplant 2012 (2/2 DM, s/p left nephrectomy), peripheral neuropathy, hypothyroidism, retroperitoneal fibrosis, HTN, HLD, GERD, anxiety/depression, ANGELIKA and recent admission at [facility] for sacral osteomyelitis and ESBL.coli urosepsis discharged on 12/18/24 to rehab. While admitted to [place] was noted to have hypercalcemia and liver masses which were biopsied on 12/16/24, biopsy revealed DLBCL. Patient received R mini CHOP on 12/28 now admitted for cycle #2 Rmini CHOP, upon admission patient was febrile so chemotherapy was held. He was found to have urosepsis with Klebsiella ESBL (Zosyn sensitive) and nasal corona virus. Plan by primary team includes discussing treatment with Tafa/Rev since his current rehab facility will not allow for transport to outpatient oncology to treat his lymphoma.     He was seen by PT on 1/18/25 and he declined to participate despite maximum encouragement as per the note. He said he feels weak and he is in pain when he has to bear weight especially on the LLE.    He lives in home with his wife who has MS. He said she is unable to take care of him. His son and daughter live nearby but they work and unable to watch him. He says they stay on the first floor and there are no stairs to enter. He has a wheelchair and a walker at home. The wheelchair can not fit into the bathroom. He is unsure of when it was the last time he walked. He says he moved minimally out of his bed when in the other hospital and in rehab. He needs assistance with ADLs. He said he was treated badly at the other rehab facility and does not want to return there. He declines ever going to a nursing home. He also states that he fell many times in the past.     Endorses neuropathy in bilateral LE and UE especially in his feet.     Imaging reviewed showed:  CAP CT: BONES: Left total hip arthroplasty. Degenerative changes. Erosive changes   within the sacrum with age-indeterminate right sacral fracture. Old right   eighth and 12th rib fractures. Extensive new bilateral groundglass nodular opacities, upper lobe   predominant. The etiology is unclear, question pneumonia. Small right and trace left pleural effusion.  Right hepatic mass is not well evaluated but appears decreased in size since 12/9/2024. Known  splenic mass is poorly seen without contrast. Confluent retroperitoneal soft tissue mass is unchanged, likely   retroperitoneal fibrosis.    REVIEW OF SYSTEMS: Negative unless stated in the HPI    VITALS  T(C): 37.4 (01-21-25 @ 09:00), Max: 38.4 (01-21-25 @ 05:48)  HR: 77 (01-21-25 @ 09:00) (72 - 89)  BP: 145/66 (01-21-25 @ 09:00) (127/68 - 158/62)  RR: 18 (01-21-25 @ 09:00) (18 - 20)  SpO2: 94% (01-21-25 @ 09:00) (92% - 94%)    PAST MEDICAL & SURGICAL HISTORY  Diabetes Mellitus Type II  ESRD on Dialysis s/p kidney transplanted  GERD (gastroesophageal reflux disease)  Depression  Hypothyroidism  Hyperlipidemia  Hypertension  ANGELIKA (obstructive sleep apnea)  Peripheral neuropathy  Shoulder fracture  Hypothyroidism  DLBCL (diffuse large B cell lymphoma)  Infectious disease  S/P cholecystectomy  Retroperitoneal fibrosis  AV fistula  S/P right cataract extraction  S/P left cataract extraction  H/O unilateral nephrectomy    SOCIAL HISTORY - as per documentation/history  His family includes: wife (Ludmila), daughter (Luz Marina) and son (Jose J).    FUNCTIONAL HISTORY  - as per documentation/history    CURRENT FUNCTIONAL STATUS: He currently needs assistance with ADLs and mobility. Patient was not able to participate with PT with their evaluation.     FAMILY HISTORY   MI (myocardial infarction)  Family history of obesity (Sibling)    RECENT LABS - Reviewed  CBC Full  -  ( 20 Jan 2025 06:57 )  WBC Count : 7.31 K/uL  RBC Count : 2.58 M/uL  Hemoglobin : 7.5 g/dL  Hematocrit : 23.9 %  Platelet Count - Automated : 485 K/uL  Mean Cell Volume : 92.6 fl  Mean Cell Hemoglobin : 29.1 pg  Mean Cell Hemoglobin Concentration : 31.4 g/dL  Auto Neutrophil # : 6.58 K/uL  Auto Lymphocyte # : 0.18 K/uL  Auto Monocyte # : 0.35 K/uL  Auto Eosinophil # : 0.03 K/uL  Auto Basophil # : 0.03 K/uL  Auto Neutrophil % : 90.0 %  Auto Lymphocyte % : 2.5 %  Auto Monocyte % : 4.8 %  Auto Eosinophil % : 0.4 %  Auto Basophil % : 0.4 %    01-20    134[L]  |  101  |  32[H]  ----------------------------<  52[LL]  4.8   |  23  |  1.15    Ca    8.9      20 Jan 2025 06:57  Phos  2.0     01-20  Mg     2.0     01-20    TPro  5.7[L]  /  Alb  2.6[L]  /  TBili  0.2  /  DBili  x   /  AST  51[H]  /  ALT  62[H]  /  AlkPhos  95  01-20    Urinalysis Basic - ( 20 Jan 2025 06:57 )    Color: x / Appearance: x / SG: x / pH: x  Gluc: 52 mg/dL / Ketone: x  / Bili: x / Urobili: x   Blood: x / Protein: x / Nitrite: x   Leuk Esterase: x / RBC: x / WBC x   Sq Epi: x / Non Sq Epi: x / Bacteria: x    ALLERGIES  No Known Allergies    MEDICATIONS   acetaminophen     Tablet .. 650 milliGRAM(s) Oral every 6 hours PRN  alteplase for catheter clearance 2 milliGRAM(s) Catheter once  alteplase for catheter clearance 2 milliGRAM(s) Catheter once  amLODIPine   Tablet 10 milliGRAM(s) Oral daily  buPROPion XL (24-Hour) . 300 milliGRAM(s) Oral daily  carvedilol 12.5 milliGRAM(s) Oral every 12 hours  chlorhexidine 4% Liquid 1 Application(s) Topical <User Schedule>  cloNIDine 0.1 milliGRAM(s) Oral three times a day  dextrose 5%. 1000 milliLiter(s) IV Continuous <Continuous>  dextrose 5%. 1000 milliLiter(s) IV Continuous <Continuous>  dextrose 50% Injectable 25 Gram(s) IV Push once  dextrose 50% Injectable 12.5 Gram(s) IV Push once  dextrose 50% Injectable 25 Gram(s) IV Push once  dextrose Oral Gel 15 Gram(s) Oral once PRN  enoxaparin Injectable 40 milliGRAM(s) SubCutaneous <User Schedule>  ertapenem  IVPB      ertapenem  IVPB 1000 milliGRAM(s) IV Intermittent every 24 hours  escitalopram 10 milliGRAM(s) Oral daily  glucagon  Injectable 1 milliGRAM(s) IntraMuscular once  glucagon  Injectable 1 milliGRAM(s) IntraMuscular once  hydrALAZINE 100 milliGRAM(s) Oral every 8 hours  insulin glargine Injectable (LANTUS) 38 Unit(s) SubCutaneous at bedtime  insulin lispro (ADMELOG) corrective regimen sliding scale   SubCutaneous three times a day before meals  insulin lispro (ADMELOG) corrective regimen sliding scale   SubCutaneous at bedtime  insulin lispro Injectable (ADMELOG) 8 Unit(s) SubCutaneous before breakfast  insulin lispro Injectable (ADMELOG) 8 Unit(s) SubCutaneous before lunch  insulin lispro Injectable (ADMELOG) 8 Unit(s) SubCutaneous before dinner  lactulose Syrup 15 Gram(s) Oral <User Schedule>  lactulose Syrup 10 Gram(s) Oral every 6 hours  levothyroxine 88 MICROGram(s) Oral daily  lisinopril 10 milliGRAM(s) Oral daily  nystatin    Suspension 788773 Unit(s) Oral every 6 hours  pantoprazole   Suspension 40 milliGRAM(s) Oral daily  polyethylene glycol 3350 17 Gram(s) Oral at bedtime  polyethylene glycol 3350 17 Gram(s) Oral daily PRN  predniSONE   Tablet 5 milliGRAM(s) Oral two times a day  rosuvastatin 10 milliGRAM(s) Oral at bedtime  senna 2 Tablet(s) Oral at bedtime  sodium chloride 0.9% lock flush 10 milliLiter(s) IV Push every 1 hour PRN  tacrolimus 1 milliGRAM(s) Oral <User Schedule>  tacrolimus 1 milliGRAM(s) Oral at bedtime  ----------------------------------------------------------------------------------------  PHYSICAL EXAM  Constitutional - Frustrated, Comfortable  HEENT - NCAT  Chest - Breathing comfortably, No wheezing  Abdomen - Soft   Extremities - No calf tenderness, Able to move bilateral UE and LE greater than antigravity in the bed.   Neurologic Exam -                    Cognitive - Awake, Alert     Communication - Fluent     Sensory - Decreased to light touch in bilateral LE and UE especially in the feet  Psychiatric - Mood stable but frustrated and upset  ----------------------------------------------------------------------------------------  ASSESSMENT/PLAN  69yMale with functional deficits after urosepsis with Klebsiella ESBL (Zosyn sensitive) and nasal corona virus.     #Bilateral LE pain (Left worse than Right)  -He has pain when he bears weight especially on the LLE. He has history of prior falls and prior left hip fracture s/p hip arthroplasty. He also has history of DLBCL. I spoke to the primary team and recommend first getting a left hip x-ray to check the integrity of his left hip   arthroplasty and also to rule out pathologic fracture due to his cancer history.   -Recommend medications for pain management. Could consider standing Tylenol but primary team would need to monitor liver enzymes since his liver enzymes are mildly elevated. Lymphoma can cause bone pain which can be managed with steroids, NSAIDs and opioids. He is already on predniSONE Tablet 5 milliGRAM(s) Oral two times a day. I did not recommend NSAIDs due to his anemia and eventual plan to resume cancer-related treatment. Could consider starting with oxycodone 5 mg q6hr PRN. Once pathologic fracture is ruled out then recommend giving patient pain medication prior to PT to increase success of tolerance of therapy.     #Chemotherapy-induced peripheral neuropathy  -This can be painful for some patient and could also be contributing to his pain. Could consider Gabapentin 300 mg TID. Could also consider topical medications such as lidocaine or capsaicin cream PRN as long as he has no open wounds or skin infections.     #Difficulty with mobility and ADLs  -Patient is on enoxaparin for DVT ppx  -This is multifactorial and could be secondary to being deconditioned after minimal mobility during hospitalization, cancer-related treatment and/or potentially an issue with his left hip arthroplasty and/or possible pathologic fracture  -Recommend first getting a left hip x-ray to check the integrity of his left hip arthroplasty and also to rule out pathologic fracture due to his cancer history.   -Recommend medications for pan management as listed above listed under the Bilateral LE pain   -Unable to provide recommendations for rehab disposition due to patient not participating with therapy. I also discussed with the primary team about their plan for the patient's cancer-related treatment and once this has been decided along with the patient participating with therapy then I can reach out to my team to ask if it is possible to have concurrent treatment with rehab. Patient's wife can not take care of him at home and his kids can not always be there since they work. Patient is not interested in a nursing home. We would also need to know what the plan would be after discharge from rehab. Due to his prior history of multiple falls, he may not be safe at home by himself. He also doesn't want to return to the prior rehab facility because he said he was treated badly.   -Will continue to follow. Functional progress will determine ongoing rehab dispo recommendations, which may change.   Patient is a 69y old  Male who was admitted for chemotherapy but then had his treatment held due to fevers and found to have urosepsis with Klebsiella ESBL (Zosyn sensitive) and nasal corona virus.    HPI:  M 68 y/o M PMHx renal transplant 2012 (2/2 DM, s/p left nephrectomy), peripheral neuropathy, hypothyroidism, retroperitoneal fibrosis, HTN, HLD, GERD, anxiety/depression, ANGELIKA and recent admission at [facility] for sacral osteomyelitis and ESBL.coli urosepsis discharged on 12/18/24 to rehab. While admitted to [facility] was noted to have hypercalcemia and liver masses which were biopsied on 12/16/24, biopsy revealed DLBCL. Patient received R mini CHOP on 12/28 now admitted for cycle #2 Rmini CHOP, upon admission patient was febrile so chemotherapy was held. He was found to have urosepsis with Klebsiella ESBL (Zosyn sensitive) and nasal corona virus. Plan by primary team includes discussing treatment with Tafa/Rev since his current rehab facility will not allow for transport to outpatient oncology to treat his lymphoma.     He was seen by PT on 1/18/25 and he declined to participate despite maximum encouragement as per the note. He said he feels weak and he is in pain when he has to bear weight especially on the LLE.    He lives in home with his wife who has MS. He said she is unable to take care of him. His son and daughter live nearby but they work and unable to watch him. He says they stay on the first floor and there are no stairs to enter. He has a wheelchair and a walker at home. The wheelchair can not fit into the bathroom. He is unsure of when it was the last time he walked. He says he moved minimally out of his bed when in the other hospital and in rehab. He needs assistance with ADLs. He said he was treated badly at the other rehab facility and does not want to return there. He declines ever going to a nursing home. He also states that he fell many times in the past.     Endorses neuropathy in bilateral LE and UE especially in his feet.     Imaging reviewed showed:  CAP CT: BONES: Left total hip arthroplasty. Degenerative changes. Erosive changes   within the sacrum with age-indeterminate right sacral fracture. Old right   eighth and 12th rib fractures. Extensive new bilateral groundglass nodular opacities, upper lobe   predominant. The etiology is unclear, question pneumonia. Small right and trace left pleural effusion.  Right hepatic mass is not well evaluated but appears decreased in size since 12/9/2024. Known  splenic mass is poorly seen without contrast. Confluent retroperitoneal soft tissue mass is unchanged, likely   retroperitoneal fibrosis.    REVIEW OF SYSTEMS: Negative unless stated in the HPI    VITALS  T(C): 37.4 (01-21-25 @ 09:00), Max: 38.4 (01-21-25 @ 05:48)  HR: 77 (01-21-25 @ 09:00) (72 - 89)  BP: 145/66 (01-21-25 @ 09:00) (127/68 - 158/62)  RR: 18 (01-21-25 @ 09:00) (18 - 20)  SpO2: 94% (01-21-25 @ 09:00) (92% - 94%)    PAST MEDICAL & SURGICAL HISTORY  Diabetes Mellitus Type II  ESRD on Dialysis s/p kidney transplanted  GERD (gastroesophageal reflux disease)  Depression  Hypothyroidism  Hyperlipidemia  Hypertension  ANGELIKA (obstructive sleep apnea)  Peripheral neuropathy  Shoulder fracture  Hypothyroidism  DLBCL (diffuse large B cell lymphoma)  Infectious disease  S/P cholecystectomy  Retroperitoneal fibrosis  AV fistula  S/P right cataract extraction  S/P left cataract extraction  H/O unilateral nephrectomy    SOCIAL HISTORY - as per documentation/history  His family includes: wife (Ludmila), daughter (Luz Marina) and son (Jose J).    FUNCTIONAL HISTORY  - as per documentation/history    CURRENT FUNCTIONAL STATUS: He currently needs assistance with ADLs and mobility. Patient was not able to participate with PT with their evaluation.     FAMILY HISTORY   MI (myocardial infarction)  Family history of obesity (Sibling)    RECENT LABS - Reviewed  CBC Full  -  ( 20 Jan 2025 06:57 )  WBC Count : 7.31 K/uL  RBC Count : 2.58 M/uL  Hemoglobin : 7.5 g/dL  Hematocrit : 23.9 %  Platelet Count - Automated : 485 K/uL  Mean Cell Volume : 92.6 fl  Mean Cell Hemoglobin : 29.1 pg  Mean Cell Hemoglobin Concentration : 31.4 g/dL  Auto Neutrophil # : 6.58 K/uL  Auto Lymphocyte # : 0.18 K/uL  Auto Monocyte # : 0.35 K/uL  Auto Eosinophil # : 0.03 K/uL  Auto Basophil # : 0.03 K/uL  Auto Neutrophil % : 90.0 %  Auto Lymphocyte % : 2.5 %  Auto Monocyte % : 4.8 %  Auto Eosinophil % : 0.4 %  Auto Basophil % : 0.4 %    01-20    134[L]  |  101  |  32[H]  ----------------------------<  52[LL]  4.8   |  23  |  1.15    Ca    8.9      20 Jan 2025 06:57  Phos  2.0     01-20  Mg     2.0     01-20    TPro  5.7[L]  /  Alb  2.6[L]  /  TBili  0.2  /  DBili  x   /  AST  51[H]  /  ALT  62[H]  /  AlkPhos  95  01-20    Urinalysis Basic - ( 20 Jan 2025 06:57 )    Color: x / Appearance: x / SG: x / pH: x  Gluc: 52 mg/dL / Ketone: x  / Bili: x / Urobili: x   Blood: x / Protein: x / Nitrite: x   Leuk Esterase: x / RBC: x / WBC x   Sq Epi: x / Non Sq Epi: x / Bacteria: x    ALLERGIES  No Known Allergies    MEDICATIONS   acetaminophen     Tablet .. 650 milliGRAM(s) Oral every 6 hours PRN  alteplase for catheter clearance 2 milliGRAM(s) Catheter once  alteplase for catheter clearance 2 milliGRAM(s) Catheter once  amLODIPine   Tablet 10 milliGRAM(s) Oral daily  buPROPion XL (24-Hour) . 300 milliGRAM(s) Oral daily  carvedilol 12.5 milliGRAM(s) Oral every 12 hours  chlorhexidine 4% Liquid 1 Application(s) Topical <User Schedule>  cloNIDine 0.1 milliGRAM(s) Oral three times a day  dextrose 5%. 1000 milliLiter(s) IV Continuous <Continuous>  dextrose 5%. 1000 milliLiter(s) IV Continuous <Continuous>  dextrose 50% Injectable 25 Gram(s) IV Push once  dextrose 50% Injectable 12.5 Gram(s) IV Push once  dextrose 50% Injectable 25 Gram(s) IV Push once  dextrose Oral Gel 15 Gram(s) Oral once PRN  enoxaparin Injectable 40 milliGRAM(s) SubCutaneous <User Schedule>  ertapenem  IVPB      ertapenem  IVPB 1000 milliGRAM(s) IV Intermittent every 24 hours  escitalopram 10 milliGRAM(s) Oral daily  glucagon  Injectable 1 milliGRAM(s) IntraMuscular once  glucagon  Injectable 1 milliGRAM(s) IntraMuscular once  hydrALAZINE 100 milliGRAM(s) Oral every 8 hours  insulin glargine Injectable (LANTUS) 38 Unit(s) SubCutaneous at bedtime  insulin lispro (ADMELOG) corrective regimen sliding scale   SubCutaneous three times a day before meals  insulin lispro (ADMELOG) corrective regimen sliding scale   SubCutaneous at bedtime  insulin lispro Injectable (ADMELOG) 8 Unit(s) SubCutaneous before breakfast  insulin lispro Injectable (ADMELOG) 8 Unit(s) SubCutaneous before lunch  insulin lispro Injectable (ADMELOG) 8 Unit(s) SubCutaneous before dinner  lactulose Syrup 15 Gram(s) Oral <User Schedule>  lactulose Syrup 10 Gram(s) Oral every 6 hours  levothyroxine 88 MICROGram(s) Oral daily  lisinopril 10 milliGRAM(s) Oral daily  nystatin    Suspension 703197 Unit(s) Oral every 6 hours  pantoprazole   Suspension 40 milliGRAM(s) Oral daily  polyethylene glycol 3350 17 Gram(s) Oral at bedtime  polyethylene glycol 3350 17 Gram(s) Oral daily PRN  predniSONE   Tablet 5 milliGRAM(s) Oral two times a day  rosuvastatin 10 milliGRAM(s) Oral at bedtime  senna 2 Tablet(s) Oral at bedtime  sodium chloride 0.9% lock flush 10 milliLiter(s) IV Push every 1 hour PRN  tacrolimus 1 milliGRAM(s) Oral <User Schedule>  tacrolimus 1 milliGRAM(s) Oral at bedtime  ----------------------------------------------------------------------------------------  PHYSICAL EXAM  Constitutional - Frustrated, Comfortable  HEENT - NCAT  Chest - Breathing comfortably, No wheezing  Abdomen - Soft   Extremities - No calf tenderness, Able to move bilateral UE and LE greater than antigravity in the bed.   Neurologic Exam -                    Cognitive - Awake, Alert     Communication - Fluent     Sensory - Decreased to light touch in bilateral LE and UE especially in the feet  Psychiatric - Mood stable but frustrated and upset  ----------------------------------------------------------------------------------------  ASSESSMENT/PLAN  69yMale with functional deficits after urosepsis with Klebsiella ESBL (Zosyn sensitive) and nasal corona virus.     #Bilateral LE pain (Left worse than Right)  -He has pain when he bears weight especially on the LLE. He has history of prior falls and prior left hip fracture s/p hip arthroplasty. He also has history of DLBCL. I spoke to the primary team and recommend first getting a left hip x-ray to check the integrity of his left hip   arthroplasty and also to rule out pathologic fracture due to his cancer history.   -Recommend medications for pain management. Could consider standing Tylenol but primary team would need to monitor liver enzymes since his liver enzymes are mildly elevated. Lymphoma can cause bone pain which can be managed with steroids, NSAIDs and opioids. He is already on predniSONE Tablet 5 milliGRAM(s) Oral two times a day. I did not recommend NSAIDs due to his anemia and eventual plan to resume cancer-related treatment. Could consider starting with oxycodone 5 mg q6hr PRN. Once pathologic fracture is ruled out then recommend giving patient pain medication prior to PT to increase success of tolerance of therapy.     #Chemotherapy-induced peripheral neuropathy  -This can be painful for some patient and could also be contributing to his pain. Could consider Gabapentin 300 mg TID. Could also consider topical medications such as lidocaine or capsaicin cream PRN as long as he has no open wounds or skin infections.     #Difficulty with mobility and ADLs  -Patient is on enoxaparin for DVT ppx  -This is multifactorial and could be secondary to being deconditioned after minimal mobility during hospitalization, cancer-related treatment and/or potentially an issue with his left hip arthroplasty and/or possible pathologic fracture  -Recommend first getting a left hip x-ray to check the integrity of his left hip arthroplasty and also to rule out pathologic fracture due to his cancer history.   -Recommend medications for pan management as listed above listed under the Bilateral LE pain   -Unable to provide recommendations for rehab disposition due to patient not participating with therapy. I also discussed with the primary team about their plan for the patient's cancer-related treatment and once this has been decided along with the patient participating with therapy then I can reach out to my team to ask if it is possible to have concurrent treatment with rehab. Patient's wife can not take care of him at home and his kids can not always be there since they work. Patient is not interested in a nursing home. We would also need to know what the plan would be after discharge from rehab. Due to his prior history of multiple falls, he may not be safe at home by himself. He also doesn't want to return to the prior rehab facility because he said he was treated badly.   -Will continue to follow. Functional progress will determine ongoing rehab dispo recommendations, which may change.     Spent a total of 55 minutes including preparing for the consult, obtaining history, performing physical exam, documenting and communicating with co-providers.   Patient is a 69y old  Male who was admitted for chemotherapy but then had his treatment held due to fevers and found to have urosepsis with Klebsiella ESBL (Zosyn sensitive) and nasal corona virus.    HPI:  M 68 y/o M PMHx renal transplant 2012 (2/2 DM, s/p left nephrectomy), peripheral neuropathy, hypothyroidism, retroperitoneal fibrosis, HTN, HLD, GERD, anxiety/depression, ANGELIKA and recent admission at [facility] for sacral osteomyelitis and ESBL.coli urosepsis discharged on 12/18/24 to rehab. While admitted to [place] was noted to have hypercalcemia and liver masses which were biopsied on 12/16/24, biopsy revealed DLBCL. Patient received R mini CHOP on 12/28 now admitted for cycle #2 Rmini CHOP, upon admission patient was febrile so chemotherapy was held. He was found to have urosepsis with Klebsiella ESBL (Zosyn sensitive) and nasal corona virus. Plan by primary team includes discussing treatment with Tafa/Rev since his current rehab facility will not allow for transport to outpatient oncology to treat his lymphoma.     He was seen by PT on 1/18/25 and he declined to participate despite maximum encouragement as per the note. He said he feels weak and he is in pain when he has to bear weight especially on the LLE.    He lives in home with his wife who has MS. He said she is unable to take care of him. His son and daughter live nearby but they work and unable to watch him. He says they stay on the first floor and there are no stairs to enter. He has a wheelchair and a walker at home. The wheelchair can not fit into the bathroom. He is unsure of when it was the last time he walked. He says he moved minimally out of his bed when in the other hospital and in rehab. He needs assistance with ADLs. He said he was treated badly at the other rehab facility and does not want to return there. He declines ever going to a nursing home. He also states that he fell many times in the past.     Endorses neuropathy in bilateral LE and UE especially in his feet.     Imaging reviewed showed:  CAP CT: BONES: Left total hip arthroplasty. Degenerative changes. Erosive changes   within the sacrum with age-indeterminate right sacral fracture. Old right   eighth and 12th rib fractures. Extensive new bilateral groundglass nodular opacities, upper lobe   predominant. The etiology is unclear, question pneumonia. Small right and trace left pleural effusion.  Right hepatic mass is not well evaluated but appears decreased in size since 12/9/2024. Known  splenic mass is poorly seen without contrast. Confluent retroperitoneal soft tissue mass is unchanged, likely   retroperitoneal fibrosis.    REVIEW OF SYSTEMS: Negative unless stated in the HPI    VITALS  T(C): 37.4 (01-21-25 @ 09:00), Max: 38.4 (01-21-25 @ 05:48)  HR: 77 (01-21-25 @ 09:00) (72 - 89)  BP: 145/66 (01-21-25 @ 09:00) (127/68 - 158/62)  RR: 18 (01-21-25 @ 09:00) (18 - 20)  SpO2: 94% (01-21-25 @ 09:00) (92% - 94%)    PAST MEDICAL & SURGICAL HISTORY  Diabetes Mellitus Type II  ESRD on Dialysis s/p kidney transplanted  GERD (gastroesophageal reflux disease)  Depression  Hypothyroidism  Hyperlipidemia  Hypertension  ANGELIKA (obstructive sleep apnea)  Peripheral neuropathy  Shoulder fracture  Hypothyroidism  DLBCL (diffuse large B cell lymphoma)  Infectious disease  S/P cholecystectomy  Retroperitoneal fibrosis  AV fistula  S/P right cataract extraction  S/P left cataract extraction  H/O unilateral nephrectomy    SOCIAL HISTORY - as per documentation/history  His family includes: wife (Ludmila), daughter (Luz Marina) and son (Jose J).    FUNCTIONAL HISTORY  - as per documentation/history    CURRENT FUNCTIONAL STATUS: He currently needs assistance with ADLs and mobility. Patient was not able to participate with PT with their evaluation.     FAMILY HISTORY   MI (myocardial infarction)  Family history of obesity (Sibling)    RECENT LABS - Reviewed  CBC Full  -  ( 20 Jan 2025 06:57 )  WBC Count : 7.31 K/uL  RBC Count : 2.58 M/uL  Hemoglobin : 7.5 g/dL  Hematocrit : 23.9 %  Platelet Count - Automated : 485 K/uL  Mean Cell Volume : 92.6 fl  Mean Cell Hemoglobin : 29.1 pg  Mean Cell Hemoglobin Concentration : 31.4 g/dL  Auto Neutrophil # : 6.58 K/uL  Auto Lymphocyte # : 0.18 K/uL  Auto Monocyte # : 0.35 K/uL  Auto Eosinophil # : 0.03 K/uL  Auto Basophil # : 0.03 K/uL  Auto Neutrophil % : 90.0 %  Auto Lymphocyte % : 2.5 %  Auto Monocyte % : 4.8 %  Auto Eosinophil % : 0.4 %  Auto Basophil % : 0.4 %    01-20    134[L]  |  101  |  32[H]  ----------------------------<  52[LL]  4.8   |  23  |  1.15    Ca    8.9      20 Jan 2025 06:57  Phos  2.0     01-20  Mg     2.0     01-20    TPro  5.7[L]  /  Alb  2.6[L]  /  TBili  0.2  /  DBili  x   /  AST  51[H]  /  ALT  62[H]  /  AlkPhos  95  01-20    Urinalysis Basic - ( 20 Jan 2025 06:57 )    Color: x / Appearance: x / SG: x / pH: x  Gluc: 52 mg/dL / Ketone: x  / Bili: x / Urobili: x   Blood: x / Protein: x / Nitrite: x   Leuk Esterase: x / RBC: x / WBC x   Sq Epi: x / Non Sq Epi: x / Bacteria: x    ALLERGIES  No Known Allergies    MEDICATIONS   acetaminophen     Tablet .. 650 milliGRAM(s) Oral every 6 hours PRN  alteplase for catheter clearance 2 milliGRAM(s) Catheter once  alteplase for catheter clearance 2 milliGRAM(s) Catheter once  amLODIPine   Tablet 10 milliGRAM(s) Oral daily  buPROPion XL (24-Hour) . 300 milliGRAM(s) Oral daily  carvedilol 12.5 milliGRAM(s) Oral every 12 hours  chlorhexidine 4% Liquid 1 Application(s) Topical <User Schedule>  cloNIDine 0.1 milliGRAM(s) Oral three times a day  dextrose 5%. 1000 milliLiter(s) IV Continuous <Continuous>  dextrose 5%. 1000 milliLiter(s) IV Continuous <Continuous>  dextrose 50% Injectable 25 Gram(s) IV Push once  dextrose 50% Injectable 12.5 Gram(s) IV Push once  dextrose 50% Injectable 25 Gram(s) IV Push once  dextrose Oral Gel 15 Gram(s) Oral once PRN  enoxaparin Injectable 40 milliGRAM(s) SubCutaneous <User Schedule>  ertapenem  IVPB      ertapenem  IVPB 1000 milliGRAM(s) IV Intermittent every 24 hours  escitalopram 10 milliGRAM(s) Oral daily  glucagon  Injectable 1 milliGRAM(s) IntraMuscular once  glucagon  Injectable 1 milliGRAM(s) IntraMuscular once  hydrALAZINE 100 milliGRAM(s) Oral every 8 hours  insulin glargine Injectable (LANTUS) 38 Unit(s) SubCutaneous at bedtime  insulin lispro (ADMELOG) corrective regimen sliding scale   SubCutaneous three times a day before meals  insulin lispro (ADMELOG) corrective regimen sliding scale   SubCutaneous at bedtime  insulin lispro Injectable (ADMELOG) 8 Unit(s) SubCutaneous before breakfast  insulin lispro Injectable (ADMELOG) 8 Unit(s) SubCutaneous before lunch  insulin lispro Injectable (ADMELOG) 8 Unit(s) SubCutaneous before dinner  lactulose Syrup 15 Gram(s) Oral <User Schedule>  lactulose Syrup 10 Gram(s) Oral every 6 hours  levothyroxine 88 MICROGram(s) Oral daily  lisinopril 10 milliGRAM(s) Oral daily  nystatin    Suspension 023690 Unit(s) Oral every 6 hours  pantoprazole   Suspension 40 milliGRAM(s) Oral daily  polyethylene glycol 3350 17 Gram(s) Oral at bedtime  polyethylene glycol 3350 17 Gram(s) Oral daily PRN  predniSONE   Tablet 5 milliGRAM(s) Oral two times a day  rosuvastatin 10 milliGRAM(s) Oral at bedtime  senna 2 Tablet(s) Oral at bedtime  sodium chloride 0.9% lock flush 10 milliLiter(s) IV Push every 1 hour PRN  tacrolimus 1 milliGRAM(s) Oral <User Schedule>  tacrolimus 1 milliGRAM(s) Oral at bedtime  ----------------------------------------------------------------------------------------  PHYSICAL EXAM  Constitutional - Frustrated, Comfortable  HEENT - NCAT  Chest - Breathing comfortably, No wheezing  Abdomen - Soft   Extremities - No calf tenderness, Able to move bilateral UE and LE greater than antigravity in the bed.   Neurologic Exam -                    Cognitive - Awake, Alert     Communication - Fluent     Sensory - Decreased to light touch in bilateral LE and UE especially in the feet  Psychiatric - Mood stable but frustrated and upset  ----------------------------------------------------------------------------------------  ASSESSMENT/PLAN  69yMale with functional deficits after urosepsis with Klebsiella ESBL (Zosyn sensitive) and nasal corona virus.     #Bilateral LE pain (Left worse than Right)  -He has pain when he bears weight especially on the LLE. He has history of prior falls and prior left hip fracture s/p hip arthroplasty. He also has history of DLBCL. I spoke to the primary team and recommend first getting a left hip x-ray to check the integrity of his left hip   arthroplasty and also to rule out pathologic fracture due to his cancer history.   -Recommend medications for pain management. Could consider standing Tylenol but primary team would need to monitor liver enzymes since his liver enzymes are mildly elevated. Lymphoma can cause bone pain which can be managed with steroids, NSAIDs and opioids. He is already on predniSONE Tablet 5 milliGRAM(s) Oral two times a day. I did not recommend NSAIDs due to his anemia and eventual plan to resume cancer-related treatment. Could consider starting with oxycodone 5 mg q6hr PRN. Once pathologic fracture is ruled out then recommend giving patient pain medication prior to PT to increase success of tolerance of therapy.    #Chemotherapy-induced peripheral neuropathy  -This can be painful for some patient and could also be contributing to his pain. Could consider Gabapentin 300 mg TID. Could also consider topical medications such as lidocaine or capsaicin cream PRN as long as he has no open wounds or skin infections.     #Difficulty with mobility and ADLs  -Patient is on enoxaparin for DVT ppx  -This is multifactorial and could be secondary to being deconditioned after minimal mobility during hospitalization, cancer-related treatment and/or potentially an issue with his left hip arthroplasty and/or possible pathologic fracture  -Recommend first getting a left hip x-ray to check the integrity of his left hip arthroplasty and also to rule out pathologic fracture due to his cancer history.   -Recommend medications for pan management as listed above listed under the Bilateral LE pain   -Unable to provide recommendations for rehab disposition due to patient not participating with therapy. I also discussed with the primary team about their plan for the patient's cancer-related treatment and once this has been decided along with the patient participating with therapy then I can reach out to my team to ask if it is possible to have concurrent treatment with rehab. Patient's wife can not take care of him at home and his kids can not always be there since they work. Patient is not interested in a nursing home. We would also need to know what the plan would be after discharge from rehab. Due to his prior history of multiple falls, he may not be safe at home by himself. He also doesn't want to return to the prior rehab facility because he said he was treated badly.   -Will continue to follow. Functional progress will determine ongoing rehab dispo recommendations, which may change.    I communicated with ALBERTO Self, one of the providers for the primary team, about my assessment and recommendations verbally in-person.     Spent a total of 55 minutes including preparing for the consult, obtaining history, performing physical exam, documenting and communicating with co-providers.   Patient is a 69y old  Male who was admitted for chemotherapy but then had his treatment held due to fevers and found to have urosepsis with Klebsiella ESBL (Zosyn sensitive) and nasal corona virus.    HPI:  M 70 y/o M PMHx renal transplant 2012 (2/2 DM, s/p left nephrectomy), peripheral neuropathy, hypothyroidism, retroperitoneal fibrosis, HTN, HLD, GERD, anxiety/depression, ANGELIKA and recent admission at [facility] for sacral osteomyelitis and ESBL.coli urosepsis discharged on 12/18/24 to rehab. While admitted to [facility] was noted to have hypercalcemia and liver masses which were biopsied on 12/16/24, biopsy revealed DLBCL. Patient received R mini CHOP on 12/28 now admitted for cycle #2 Rmini CHOP, upon admission patient was febrile so chemotherapy was held. He was found to have urosepsis with Klebsiella ESBL (Zosyn sensitive) and nasal corona virus. Plan by primary team includes discussing treatment with Tafa/Rev since his current rehab facility will not allow for transport to outpatient oncology to treat his lymphoma.     He was seen by PT on 1/18/25 and he declined to participate despite maximum encouragement as per the note. He said he feels weak and he is in pain when he has to bear weight especially on the LLE.    He lives in home with his wife who has MS. He said she is unable to take care of him. His son and daughter live nearby but they work and unable to watch him. He says they stay on the first floor and there are no stairs to enter. He has a wheelchair and a walker at home. The wheelchair can not fit into the bathroom. He is unsure of when it was the last time he walked. He says he moved minimally out of his bed when in the other hospital and in rehab. He needs assistance with ADLs. He said he was treated badly at the other rehab facility and does not want to return there. He declines ever going to a nursing home. He also states that he fell many times in the past.     Endorses neuropathy in bilateral LE and UE especially in his feet.     Imaging reviewed showed:  CAP CT: BONES: Left total hip arthroplasty. Degenerative changes. Erosive changes   within the sacrum with age-indeterminate right sacral fracture. Old right   eighth and 12th rib fractures. Extensive new bilateral groundglass nodular opacities, upper lobe   predominant. The etiology is unclear, question pneumonia. Small right and trace left pleural effusion.  Right hepatic mass is not well evaluated but appears decreased in size since 12/9/2024. Known  splenic mass is poorly seen without contrast. Confluent retroperitoneal soft tissue mass is unchanged, likely   retroperitoneal fibrosis.    REVIEW OF SYSTEMS: Negative unless stated in the HPI    VITALS  T(C): 37.4 (01-21-25 @ 09:00), Max: 38.4 (01-21-25 @ 05:48)  HR: 77 (01-21-25 @ 09:00) (72 - 89)  BP: 145/66 (01-21-25 @ 09:00) (127/68 - 158/62)  RR: 18 (01-21-25 @ 09:00) (18 - 20)  SpO2: 94% (01-21-25 @ 09:00) (92% - 94%)    PAST MEDICAL & SURGICAL HISTORY  Diabetes Mellitus Type II  ESRD on Dialysis s/p kidney transplanted  GERD (gastroesophageal reflux disease)  Depression  Hypothyroidism  Hyperlipidemia  Hypertension  ANGELIKA (obstructive sleep apnea)  Peripheral neuropathy  Shoulder fracture  Hypothyroidism  DLBCL (diffuse large B cell lymphoma)  Infectious disease  S/P cholecystectomy  Retroperitoneal fibrosis  AV fistula  S/P right cataract extraction  S/P left cataract extraction  H/O unilateral nephrectomy    SOCIAL HISTORY - as per documentation/history  His family includes: wife (Ludmila), daughter (Luz Marina) and son (Jose J).    FUNCTIONAL HISTORY  - as per documentation/history    CURRENT FUNCTIONAL STATUS: He currently needs assistance with ADLs and mobility. Patient was not able to participate with PT with their evaluation.     FAMILY HISTORY   MI (myocardial infarction)  Family history of obesity (Sibling)    RECENT LABS - Reviewed  CBC Full  -  ( 20 Jan 2025 06:57 )  WBC Count : 7.31 K/uL  RBC Count : 2.58 M/uL  Hemoglobin : 7.5 g/dL  Hematocrit : 23.9 %  Platelet Count - Automated : 485 K/uL  Mean Cell Volume : 92.6 fl  Mean Cell Hemoglobin : 29.1 pg  Mean Cell Hemoglobin Concentration : 31.4 g/dL  Auto Neutrophil # : 6.58 K/uL  Auto Lymphocyte # : 0.18 K/uL  Auto Monocyte # : 0.35 K/uL  Auto Eosinophil # : 0.03 K/uL  Auto Basophil # : 0.03 K/uL  Auto Neutrophil % : 90.0 %  Auto Lymphocyte % : 2.5 %  Auto Monocyte % : 4.8 %  Auto Eosinophil % : 0.4 %  Auto Basophil % : 0.4 %    01-20    134[L]  |  101  |  32[H]  ----------------------------<  52[LL]  4.8   |  23  |  1.15    Ca    8.9      20 Jan 2025 06:57  Phos  2.0     01-20  Mg     2.0     01-20    TPro  5.7[L]  /  Alb  2.6[L]  /  TBili  0.2  /  DBili  x   /  AST  51[H]  /  ALT  62[H]  /  AlkPhos  95  01-20    Urinalysis Basic - ( 20 Jan 2025 06:57 )    Color: x / Appearance: x / SG: x / pH: x  Gluc: 52 mg/dL / Ketone: x  / Bili: x / Urobili: x   Blood: x / Protein: x / Nitrite: x   Leuk Esterase: x / RBC: x / WBC x   Sq Epi: x / Non Sq Epi: x / Bacteria: x    ALLERGIES  No Known Allergies    MEDICATIONS   acetaminophen     Tablet .. 650 milliGRAM(s) Oral every 6 hours PRN  alteplase for catheter clearance 2 milliGRAM(s) Catheter once  alteplase for catheter clearance 2 milliGRAM(s) Catheter once  amLODIPine   Tablet 10 milliGRAM(s) Oral daily  buPROPion XL (24-Hour) . 300 milliGRAM(s) Oral daily  carvedilol 12.5 milliGRAM(s) Oral every 12 hours  chlorhexidine 4% Liquid 1 Application(s) Topical <User Schedule>  cloNIDine 0.1 milliGRAM(s) Oral three times a day  dextrose 5%. 1000 milliLiter(s) IV Continuous <Continuous>  dextrose 5%. 1000 milliLiter(s) IV Continuous <Continuous>  dextrose 50% Injectable 25 Gram(s) IV Push once  dextrose 50% Injectable 12.5 Gram(s) IV Push once  dextrose 50% Injectable 25 Gram(s) IV Push once  dextrose Oral Gel 15 Gram(s) Oral once PRN  enoxaparin Injectable 40 milliGRAM(s) SubCutaneous <User Schedule>  ertapenem  IVPB      ertapenem  IVPB 1000 milliGRAM(s) IV Intermittent every 24 hours  escitalopram 10 milliGRAM(s) Oral daily  glucagon  Injectable 1 milliGRAM(s) IntraMuscular once  glucagon  Injectable 1 milliGRAM(s) IntraMuscular once  hydrALAZINE 100 milliGRAM(s) Oral every 8 hours  insulin glargine Injectable (LANTUS) 38 Unit(s) SubCutaneous at bedtime  insulin lispro (ADMELOG) corrective regimen sliding scale   SubCutaneous three times a day before meals  insulin lispro (ADMELOG) corrective regimen sliding scale   SubCutaneous at bedtime  insulin lispro Injectable (ADMELOG) 8 Unit(s) SubCutaneous before breakfast  insulin lispro Injectable (ADMELOG) 8 Unit(s) SubCutaneous before lunch  insulin lispro Injectable (ADMELOG) 8 Unit(s) SubCutaneous before dinner  lactulose Syrup 15 Gram(s) Oral <User Schedule>  lactulose Syrup 10 Gram(s) Oral every 6 hours  levothyroxine 88 MICROGram(s) Oral daily  lisinopril 10 milliGRAM(s) Oral daily  nystatin    Suspension 469655 Unit(s) Oral every 6 hours  pantoprazole   Suspension 40 milliGRAM(s) Oral daily  polyethylene glycol 3350 17 Gram(s) Oral at bedtime  polyethylene glycol 3350 17 Gram(s) Oral daily PRN  predniSONE   Tablet 5 milliGRAM(s) Oral two times a day  rosuvastatin 10 milliGRAM(s) Oral at bedtime  senna 2 Tablet(s) Oral at bedtime  sodium chloride 0.9% lock flush 10 milliLiter(s) IV Push every 1 hour PRN  tacrolimus 1 milliGRAM(s) Oral <User Schedule>  tacrolimus 1 milliGRAM(s) Oral at bedtime  ----------------------------------------------------------------------------------------  PHYSICAL EXAM  Constitutional - Frustrated, Comfortable  HEENT - NCAT  Chest - Breathing comfortably, No wheezing  Abdomen - Soft   Extremities - No calf tenderness, Able to move bilateral UE and LE greater than antigravity in the bed.   Neurologic Exam -                    Cognitive - Awake, Alert     Communication - Fluent     Sensory - Decreased to light touch in bilateral LE and UE especially in the feet  Psychiatric - Mood stable but frustrated and upset  ----------------------------------------------------------------------------------------  ASSESSMENT/PLAN  69yMale with functional deficits after urosepsis with Klebsiella ESBL (Zosyn sensitive) and nasal corona virus.     #Bilateral LE pain (Left worse than Right)  -He has pain when he bears weight especially on the LLE. He has history of prior falls and prior left hip fracture s/p hip arthroplasty. He also has history of DLBCL. I spoke to the primary team and recommend first getting a left hip x-ray to check the integrity of his left hip   arthroplasty and also to rule out pathologic fracture due to his cancer history.   -Recommend medications for pain management. Could consider standing Tylenol but primary team would need to monitor liver enzymes since his liver enzymes are mildly elevated. Lymphoma can cause bone pain which can be managed with steroids, NSAIDs and opioids. He is already on predniSONE Tablet 5 milliGRAM(s) Oral two times a day. I did not recommend NSAIDs due to his anemia and eventual plan to resume cancer-related treatment. Could consider starting with oxycodone 5 mg q6hr PRN. Monitor for constipation if the patient gets started on oxycodone. He is currently on a bowel regimen. Once pathologic fracture is ruled out then recommend giving patient pain medication prior to PT to increase success of tolerance of therapy.    #Chemotherapy-induced peripheral neuropathy  -This can be painful for some patient and could also be contributing to his pain. Could consider Gabapentin 300 mg TID. Could also consider topical medications such as lidocaine or capsaicin cream PRN as long as he has no open wounds or skin infections.     #Difficulty with mobility and ADLs  -Patient is on enoxaparin for DVT ppx  -This is multifactorial and could be secondary to being deconditioned after minimal mobility during hospitalization, cancer-related treatment and/or potentially an issue with his left hip arthroplasty and/or possible pathologic fracture  -Recommend first getting a left hip x-ray to check the integrity of his left hip arthroplasty and also to rule out pathologic fracture due to his cancer history.   -Recommend medications for pan management as listed above listed under the Bilateral LE pain   -Unable to provide recommendations for rehab disposition due to patient not participating with therapy. I also discussed with the primary team about their plan for the patient's cancer-related treatment and once this has been decided along with the patient participating with therapy then I can reach out to my team to ask if it is possible to have concurrent treatment with rehab. Patient's wife can not take care of him at home and his kids can not always be there since they work. Patient is not interested in a nursing home. We would also need to know what the plan would be after discharge from rehab. Due to his prior history of multiple falls, he may not be safe at home by himself. He also doesn't want to return to the prior rehab facility because he said he was treated badly.   -Will continue to follow. Functional progress will determine ongoing rehab dispo recommendations, which may change.    I communicated with ALBERTO Self, one of the providers for the primary team, about my assessment and recommendations verbally in-person.     Spent a total of 55 minutes including preparing for the consult, obtaining history, performing physical exam, documenting and communicating with co-providers.   Patient is a 69y old  Male who was admitted for chemotherapy but then had his treatment held due to fevers and found to have urosepsis with Klebsiella ESBL (Zosyn sensitive) and nasal corona virus.    HPI:  M 68 y/o M PMHx renal transplant 2012 (2/2 DM, s/p left nephrectomy), peripheral neuropathy, hypothyroidism, retroperitoneal fibrosis, HTN, HLD, GERD, anxiety/depression, ANGELIKA and recent admission at [facility] for sacral osteomyelitis and ESBL.coli urosepsis discharged on 12/18/24 to rehab. While admitted to [facility] [facility] was noted to have hypercalcemia and liver masses which were biopsied on 12/16/24, biopsy revealed DLBCL. Patient received R mini CHOP on 12/28 now admitted for cycle #2 Rmini CHOP, upon admission patient was febrile so chemotherapy was held. He was found to have urosepsis with Klebsiella ESBL (Zosyn sensitive) and nasal corona virus. Plan by primary team includes discussing treatment with Tafa/Rev since his current rehab facility will not allow for transport to outpatient oncology to treat his lymphoma.     He was seen by PT on 1/18/25 and he declined to participate despite maximum encouragement as per the note. He said he feels weak and he is in pain when he has to bear weight especially on the LLE.    He lives in home with his wife who has MS. He said she is unable to take care of him. His son and daughter live nearby but they work and unable to watch him. He says they stay on the first floor and there are no stairs to enter. He has a wheelchair and a walker at home. The wheelchair can not fit into the bathroom. He is unsure of when he last walked. He says he moved minimally out of his bed when in the other hospital and in rehab. He needs assistance with ADLs. He said he was treated badly at the other rehab facility and does not want to return there. He declines ever going to a nursing home. He also states that he fell many times in the past.     Endorses neuropathy in bilateral LE and UE especially in his feet.     Imaging reviewed showed:  CAP CT: BONES: Left total hip arthroplasty. Degenerative changes. Erosive changes   within the sacrum with age-indeterminate right sacral fracture. Old right   eighth and 12th rib fractures. Extensive new bilateral groundglass nodular opacities, upper lobe   predominant. The etiology is unclear, question pneumonia. Small right and trace left pleural effusion.  Right hepatic mass is not well evaluated but appears decreased in size since 12/9/2024. Known  splenic mass is poorly seen without contrast. Confluent retroperitoneal soft tissue mass is unchanged, likely   retroperitoneal fibrosis.    REVIEW OF SYSTEMS: Negative unless stated in the HPI    VITALS  T(C): 37.4 (01-21-25 @ 09:00), Max: 38.4 (01-21-25 @ 05:48)  HR: 77 (01-21-25 @ 09:00) (72 - 89)  BP: 145/66 (01-21-25 @ 09:00) (127/68 - 158/62)  RR: 18 (01-21-25 @ 09:00) (18 - 20)  SpO2: 94% (01-21-25 @ 09:00) (92% - 94%)    PAST MEDICAL & SURGICAL HISTORY  Diabetes Mellitus Type II  ESRD on Dialysis s/p kidney transplanted  GERD (gastroesophageal reflux disease)  Depression  Hypothyroidism  Hyperlipidemia  Hypertension  ANGELIKA (obstructive sleep apnea)  Peripheral neuropathy  Shoulder fracture  Hypothyroidism  DLBCL (diffuse large B cell lymphoma)  Infectious disease  S/P cholecystectomy  Retroperitoneal fibrosis  AV fistula  S/P right cataract extraction  S/P left cataract extraction  H/O unilateral nephrectomy    SOCIAL HISTORY - as per documentation/history  His family includes: wife (Ludmila), daughter (Luz Marina) and son (Jose J).    FUNCTIONAL HISTORY  - as per documentation/history    CURRENT FUNCTIONAL STATUS: He currently needs assistance with ADLs and mobility. Patient was not able to participate with PT with their evaluation.     FAMILY HISTORY   MI (myocardial infarction)  Family history of obesity (Sibling)    RECENT LABS - Reviewed  CBC Full  -  ( 20 Jan 2025 06:57 )  WBC Count : 7.31 K/uL  RBC Count : 2.58 M/uL  Hemoglobin : 7.5 g/dL  Hematocrit : 23.9 %  Platelet Count - Automated : 485 K/uL  Mean Cell Volume : 92.6 fl  Mean Cell Hemoglobin : 29.1 pg  Mean Cell Hemoglobin Concentration : 31.4 g/dL  Auto Neutrophil # : 6.58 K/uL  Auto Lymphocyte # : 0.18 K/uL  Auto Monocyte # : 0.35 K/uL  Auto Eosinophil # : 0.03 K/uL  Auto Basophil # : 0.03 K/uL  Auto Neutrophil % : 90.0 %  Auto Lymphocyte % : 2.5 %  Auto Monocyte % : 4.8 %  Auto Eosinophil % : 0.4 %  Auto Basophil % : 0.4 %    01-20    134[L]  |  101  |  32[H]  ----------------------------<  52[LL]  4.8   |  23  |  1.15    Ca    8.9      20 Jan 2025 06:57  Phos  2.0     01-20  Mg     2.0     01-20    TPro  5.7[L]  /  Alb  2.6[L]  /  TBili  0.2  /  DBili  x   /  AST  51[H]  /  ALT  62[H]  /  AlkPhos  95  01-20    Urinalysis Basic - ( 20 Jan 2025 06:57 )    Color: x / Appearance: x / SG: x / pH: x  Gluc: 52 mg/dL / Ketone: x  / Bili: x / Urobili: x   Blood: x / Protein: x / Nitrite: x   Leuk Esterase: x / RBC: x / WBC x   Sq Epi: x / Non Sq Epi: x / Bacteria: x    ALLERGIES  No Known Allergies    MEDICATIONS   acetaminophen     Tablet .. 650 milliGRAM(s) Oral every 6 hours PRN  alteplase for catheter clearance 2 milliGRAM(s) Catheter once  alteplase for catheter clearance 2 milliGRAM(s) Catheter once  amLODIPine   Tablet 10 milliGRAM(s) Oral daily  buPROPion XL (24-Hour) . 300 milliGRAM(s) Oral daily  carvedilol 12.5 milliGRAM(s) Oral every 12 hours  chlorhexidine 4% Liquid 1 Application(s) Topical <User Schedule>  cloNIDine 0.1 milliGRAM(s) Oral three times a day  dextrose 5%. 1000 milliLiter(s) IV Continuous <Continuous>  dextrose 5%. 1000 milliLiter(s) IV Continuous <Continuous>  dextrose 50% Injectable 25 Gram(s) IV Push once  dextrose 50% Injectable 12.5 Gram(s) IV Push once  dextrose 50% Injectable 25 Gram(s) IV Push once  dextrose Oral Gel 15 Gram(s) Oral once PRN  enoxaparin Injectable 40 milliGRAM(s) SubCutaneous <User Schedule>  ertapenem  IVPB      ertapenem  IVPB 1000 milliGRAM(s) IV Intermittent every 24 hours  escitalopram 10 milliGRAM(s) Oral daily  glucagon  Injectable 1 milliGRAM(s) IntraMuscular once  glucagon  Injectable 1 milliGRAM(s) IntraMuscular once  hydrALAZINE 100 milliGRAM(s) Oral every 8 hours  insulin glargine Injectable (LANTUS) 38 Unit(s) SubCutaneous at bedtime  insulin lispro (ADMELOG) corrective regimen sliding scale   SubCutaneous three times a day before meals  insulin lispro (ADMELOG) corrective regimen sliding scale   SubCutaneous at bedtime  insulin lispro Injectable (ADMELOG) 8 Unit(s) SubCutaneous before breakfast  insulin lispro Injectable (ADMELOG) 8 Unit(s) SubCutaneous before lunch  insulin lispro Injectable (ADMELOG) 8 Unit(s) SubCutaneous before dinner  lactulose Syrup 15 Gram(s) Oral <User Schedule>  lactulose Syrup 10 Gram(s) Oral every 6 hours  levothyroxine 88 MICROGram(s) Oral daily  lisinopril 10 milliGRAM(s) Oral daily  nystatin    Suspension [id number] Unit(s) Oral every 6 hours  pantoprazole   Suspension 40 milliGRAM(s) Oral daily  polyethylene glycol 3350 17 Gram(s) Oral at bedtime  polyethylene glycol 3350 17 Gram(s) Oral daily PRN  predniSONE   Tablet 5 milliGRAM(s) Oral two times a day  rosuvastatin 10 milliGRAM(s) Oral at bedtime  senna 2 Tablet(s) Oral at bedtime  sodium chloride 0.9% lock flush 10 milliLiter(s) IV Push every 1 hour PRN  tacrolimus 1 milliGRAM(s) Oral <User Schedule>  tacrolimus 1 milliGRAM(s) Oral at bedtime  ----------------------------------------------------------------------------------------  PHYSICAL EXAM  Constitutional - Frustrated, Comfortable  HEENT - NCAT  Chest - Breathing comfortably, No wheezing  Abdomen - Soft   Extremities - No calf tenderness, Able to move bilateral UE and LE greater than antigravity in the bed.   Neurologic Exam -                    Cognitive - Awake, Alert     Communication - Fluent     Sensory - Decreased to light touch in bilateral LE and UE especially in the feet  Psychiatric - Mood stable but frustrated and upset  ----------------------------------------------------------------------------------------  ASSESSMENT/PLAN  69yMale with functional deficits after urosepsis with Klebsiella ESBL (Zosyn sensitive) and nasal corona virus.     #Bilateral LE pain (Left worse than Right)  -He has pain when he bears weight especially on the LLE. He has history of prior falls and prior left hip fracture s/p hip arthroplasty. He also has history of DLBCL. I spoke to the primary team and recommend first getting a left hip x-ray to check the integrity of his left hip   arthroplasty and also to rule out pathologic fracture due to his cancer history.   -Recommend medications for pain management. Could consider standing Tylenol but primary team would need to monitor liver enzymes since his liver enzymes are mildly elevated. Lymphoma can cause bone pain which can be managed with steroids, NSAIDs and opioids. He is already on predniSONE Tablet 5 milliGRAM(s) Oral two times a day. I do not recommend NSAIDs due to his anemia and eventual plan to resume cancer-related treatment. Could consider starting with oxycodone 5 mg q6hr PRN. Monitor for constipation if the patient gets started on oxycodone. He is currently on a bowel regimen. Once pathologic fracture is ruled out then recommend giving patient pain medication prior to PT to increase success of tolerance of therapy.    #Chemotherapy-induced peripheral neuropathy  -This can be painful for some patient and could also be contributing to his pain. Could consider Gabapentin 300 mg TID. Could also consider topical medications such as lidocaine or capsaicin cream PRN as long as he has no open wounds or skin infections.     #Difficulty with mobility and ADLs  -Patient is on enoxaparin for DVT ppx  -This is multifactorial and could be secondary to being deconditioned after minimal mobility during hospitalization, cancer-related treatment and/or potentially an issue with his left hip arthroplasty and/or possible pathologic fracture.   -Recommend first getting a left hip x-ray to check the integrity of his left hip arthroplasty and also to rule out pathologic fracture due to his cancer history.   -Recommend medications for pan management as listed above listed under the Bilateral LE pain   -If no improvement or slow improvement then could consider an EMG/NCS outpatient  -Patient has anemia and this will need to be monitored. If his hemoglobin drops below 7 then he should not participate in therapy.  -Unable to provide recommendations for rehab disposition due to patient not participating with therapy. I also discussed with the primary team about their plan for the patient's cancer-related treatment and once this has been decided along with the patient participating with therapy then I can reach out to my team to ask if it is possible to have concurrent treatment with rehab. Patient's wife can not take care of him at home and his kids can not always be there since they work. Patient is not interested in a nursing home. We would also need to know what the plan would be after discharge from rehab. Due to his prior history of multiple falls, he may not be safe at home by himself. He also doesn't want to return to the prior rehab facility because he said he was treated badly.   -Will continue to follow. Functional progress will determine ongoing rehab dispo recommendations, which may change.    I communicated with ALBERTO Self, one of the providers for the primary team, about my assessment and recommendations verbally in-person.     Spent a total of 55 minutes including preparing for the consult, obtaining history, performing physical exam, documenting and communicating with co-providers.   Patient is a 69y old  Male who was admitted for chemotherapy but then had his treatment held due to fevers and found to have urosepsis with Klebsiella ESBL (Zosyn sensitive) and nasal corona virus.    HPI:  M 68 y/o M PMHx renal transplant 2012 (2/2 DM, s/p left nephrectomy), peripheral neuropathy, hypothyroidism, retroperitoneal fibrosis, HTN, HLD, GERD, anxiety/depression, ANGELIKA and recent admission at [facility] for sacral osteomyelitis and ESBL.coli urosepsis discharged on 12/18/24 to rehab. While admitted to [place] was noted to have hypercalcemia and liver masses which were biopsied on 12/16/24, biopsy revealed DLBCL. Patient received R mini CHOP on 12/28 now admitted for cycle #2 Rmini CHOP, upon admission patient was febrile so chemotherapy was held. He was found to have urosepsis with Klebsiella ESBL (Zosyn sensitive) and nasal corona virus. Plan by primary team includes discussing treatment with Tafa/Rev since his current rehab facility will not allow for transport to outpatient oncology to treat his lymphoma.     He was seen by PT on 1/18/25 and he declined to participate despite maximum encouragement as per the note. He said he feels weak and he is in pain when he has to bear weight especially on the LLE.    He lives in home with his wife who has MS. He said she is unable to take care of him. His son and daughter live nearby but they work and unable to watch him. He says they stay on the first floor and there are no stairs to enter. He has a wheelchair and a walker at home. The wheelchair can not fit into the bathroom. He is unsure of when he last walked. He says he moved minimally out of his bed when in the other hospital and in rehab. He needs assistance with ADLs. He said he was treated badly at the other rehab facility and does not want to return there. He declines ever going to a nursing home. He also states that he fell many times in the past.     Endorses neuropathy in bilateral LE and UE especially in his feet.     Imaging reviewed showed:  CAP CT: BONES: Left total hip arthroplasty. Degenerative changes. Erosive changes   within the sacrum with age-indeterminate right sacral fracture. Old right   eighth and 12th rib fractures. Extensive new bilateral groundglass nodular opacities, upper lobe   predominant. The etiology is unclear, question pneumonia. Small right and trace left pleural effusion.  Right hepatic mass is not well evaluated but appears decreased in size since 12/9/2024. Known  splenic mass is poorly seen without contrast. Confluent retroperitoneal soft tissue mass is unchanged, likely   retroperitoneal fibrosis.    REVIEW OF SYSTEMS: Negative unless stated in the HPI    VITALS  T(C): 37.4 (01-21-25 @ 09:00), Max: 38.4 (01-21-25 @ 05:48)  HR: 77 (01-21-25 @ 09:00) (72 - 89)  BP: 145/66 (01-21-25 @ 09:00) (127/68 - 158/62)  RR: 18 (01-21-25 @ 09:00) (18 - 20)  SpO2: 94% (01-21-25 @ 09:00) (92% - 94%)    PAST MEDICAL & SURGICAL HISTORY  Diabetes Mellitus Type II  ESRD on Dialysis s/p kidney transplanted  GERD (gastroesophageal reflux disease)  Depression  Hypothyroidism  Hyperlipidemia  Hypertension  ANGELIKA (obstructive sleep apnea)  Peripheral neuropathy  Shoulder fracture  Hypothyroidism  DLBCL (diffuse large B cell lymphoma)  Infectious disease  S/P cholecystectomy  Retroperitoneal fibrosis  AV fistula  S/P right cataract extraction  S/P left cataract extraction  H/O unilateral nephrectomy    SOCIAL HISTORY - as per documentation/history  His family includes: wife (Ludmila), daughter (Luz Marina) and son (Jose J).    FUNCTIONAL HISTORY  - as per documentation/history    CURRENT FUNCTIONAL STATUS: He currently needs assistance with ADLs and mobility. Patient was not able to participate with PT with their evaluation.     FAMILY HISTORY   MI (myocardial infarction)  Family history of obesity (Sibling)    RECENT LABS - Reviewed  CBC Full  -  ( 20 Jan 2025 06:57 )  WBC Count : 7.31 K/uL  RBC Count : 2.58 M/uL  Hemoglobin : 7.5 g/dL  Hematocrit : 23.9 %  Platelet Count - Automated : 485 K/uL  Mean Cell Volume : 92.6 fl  Mean Cell Hemoglobin : 29.1 pg  Mean Cell Hemoglobin Concentration : 31.4 g/dL  Auto Neutrophil # : 6.58 K/uL  Auto Lymphocyte # : 0.18 K/uL  Auto Monocyte # : 0.35 K/uL  Auto Eosinophil # : 0.03 K/uL  Auto Basophil # : 0.03 K/uL  Auto Neutrophil % : 90.0 %  Auto Lymphocyte % : 2.5 %  Auto Monocyte % : 4.8 %  Auto Eosinophil % : 0.4 %  Auto Basophil % : 0.4 %    01-20    134[L]  |  101  |  32[H]  ----------------------------<  52[LL]  4.8   |  23  |  1.15    Ca    8.9      20 Jan 2025 06:57  Phos  2.0     01-20  Mg     2.0     01-20    TPro  5.7[L]  /  Alb  2.6[L]  /  TBili  0.2  /  DBili  x   /  AST  51[H]  /  ALT  62[H]  /  AlkPhos  95  01-20    Urinalysis Basic - ( 20 Jan 2025 06:57 )    Color: x / Appearance: x / SG: x / pH: x  Gluc: 52 mg/dL / Ketone: x  / Bili: x / Urobili: x   Blood: x / Protein: x / Nitrite: x   Leuk Esterase: x / RBC: x / WBC x   Sq Epi: x / Non Sq Epi: x / Bacteria: x    ALLERGIES  No Known Allergies    MEDICATIONS   acetaminophen     Tablet .. 650 milliGRAM(s) Oral every 6 hours PRN  alteplase for catheter clearance 2 milliGRAM(s) Catheter once  alteplase for catheter clearance 2 milliGRAM(s) Catheter once  amLODIPine   Tablet 10 milliGRAM(s) Oral daily  buPROPion XL (24-Hour) . 300 milliGRAM(s) Oral daily  carvedilol 12.5 milliGRAM(s) Oral every 12 hours  chlorhexidine 4% Liquid 1 Application(s) Topical <User Schedule>  cloNIDine 0.1 milliGRAM(s) Oral three times a day  dextrose 5%. 1000 milliLiter(s) IV Continuous <Continuous>  dextrose 5%. 1000 milliLiter(s) IV Continuous <Continuous>  dextrose 50% Injectable 25 Gram(s) IV Push once  dextrose 50% Injectable 12.5 Gram(s) IV Push once  dextrose 50% Injectable 25 Gram(s) IV Push once  dextrose Oral Gel 15 Gram(s) Oral once PRN  enoxaparin Injectable 40 milliGRAM(s) SubCutaneous <User Schedule>  ertapenem  IVPB      ertapenem  IVPB 1000 milliGRAM(s) IV Intermittent every 24 hours  escitalopram 10 milliGRAM(s) Oral daily  glucagon  Injectable 1 milliGRAM(s) IntraMuscular once  glucagon  Injectable 1 milliGRAM(s) IntraMuscular once  hydrALAZINE 100 milliGRAM(s) Oral every 8 hours  insulin glargine Injectable (LANTUS) 38 Unit(s) SubCutaneous at bedtime  insulin lispro (ADMELOG) corrective regimen sliding scale   SubCutaneous three times a day before meals  insulin lispro (ADMELOG) corrective regimen sliding scale   SubCutaneous at bedtime  insulin lispro Injectable (ADMELOG) 8 Unit(s) SubCutaneous before breakfast  insulin lispro Injectable (ADMELOG) 8 Unit(s) SubCutaneous before lunch  insulin lispro Injectable (ADMELOG) 8 Unit(s) SubCutaneous before dinner  lactulose Syrup 15 Gram(s) Oral <User Schedule>  lactulose Syrup 10 Gram(s) Oral every 6 hours  levothyroxine 88 MICROGram(s) Oral daily  lisinopril 10 milliGRAM(s) Oral daily  nystatin    Suspension 721332 Unit(s) Oral every 6 hours  pantoprazole   Suspension 40 milliGRAM(s) Oral daily  polyethylene glycol 3350 17 Gram(s) Oral at bedtime  polyethylene glycol 3350 17 Gram(s) Oral daily PRN  predniSONE   Tablet 5 milliGRAM(s) Oral two times a day  rosuvastatin 10 milliGRAM(s) Oral at bedtime  senna 2 Tablet(s) Oral at bedtime  sodium chloride 0.9% lock flush 10 milliLiter(s) IV Push every 1 hour PRN  tacrolimus 1 milliGRAM(s) Oral <User Schedule>  tacrolimus 1 milliGRAM(s) Oral at bedtime  ----------------------------------------------------------------------------------------  PHYSICAL EXAM  Constitutional - Frustrated, Comfortable  HEENT - NCAT  Chest - Breathing comfortably, No wheezing  Abdomen - Soft   Extremities - No calf tenderness, Able to move bilateral UE and LE greater than antigravity in the bed.   Neurologic Exam -                    Cognitive - Awake, Alert     Communication - Fluent     Sensory - Decreased to light touch in bilateral LE and UE especially in the feet  Psychiatric - Mood stable but frustrated and upset  ----------------------------------------------------------------------------------------  ASSESSMENT/PLAN  69yMale with functional deficits after urosepsis with Klebsiella ESBL (Zosyn sensitive) and nasal corona virus.     #Bilateral LE pain (Left worse than Right)  -He has pain when he bears weight especially on the LLE. He has history of prior falls and prior left hip fracture s/p hip arthroplasty. He also has history of DLBCL. I spoke to the primary team and recommend first getting a left hip x-ray to check the integrity of his left hip   arthroplasty and also to rule out pathologic fracture due to his cancer history.   -Recommend medications for pain management. Could consider standing Tylenol but primary team would need to monitor liver enzymes since his liver enzymes are mildly elevated. Lymphoma can cause bone pain which can be managed with steroids, NSAIDs and opioids. He is already on predniSONE Tablet 5 milliGRAM(s) Oral two times a day. I do not recommend NSAIDs due to his anemia and eventual plan to resume cancer-related treatment. Could consider starting with oxycodone 5 mg q6hr PRN. Monitor for constipation if the patient gets started on oxycodone. He is currently on a bowel regimen. Once pathologic fracture is ruled out then recommend giving patient pain medication prior to PT to increase success of tolerance of therapy.    #Chemotherapy-induced peripheral neuropathy  -This can be painful for some patient and could also be contributing to his pain. Could consider Gabapentin 300 mg TID. Could also consider topical medications such as lidocaine or capsaicin cream PRN as long as he has no open wounds or skin infections.     #Difficulty with mobility and ADLs  -Patient is on enoxaparin for DVT ppx  -This is multifactorial and could be secondary to being deconditioned after minimal mobility during hospitalization, pain due to his lymphoma, cancer-related treatment and/or potentially an issue with his left hip arthroplasty and/or possible pathologic fracture.   -Recommend first getting a left hip x-ray to check the integrity of his left hip arthroplasty and also to rule out pathologic fracture due to his cancer history.   -Recommend medications for pan management as listed above listed under the Bilateral LE pain   -If no improvement or slow improvement then could consider an EMG/NCS outpatient  -Patient has anemia and this will need to be monitored. If his hemoglobin drops below 7 then he should not participate in therapy.  -Unable to provide recommendations for rehab disposition due to patient not participating with therapy. I also discussed with the primary team about their plan for the patient's cancer-related treatment and once this has been decided along with the patient participating with therapy then I can reach out to my team to ask if it is possible to have concurrent cancer-related treatment with rehab. Patient's wife can not take care of him at home and his kids can not always be there since they work. Patient is not interested in a nursing home. We would also need to know what the home plan would be after discharge from rehab. Due to his prior history of multiple falls, he may not be safe at home by himself. He also doesn't want to return to the prior rehab facility because he said he was treated badly.   -Will continue to follow. Functional progress will determine ongoing rehab dispo recommendations, which may change.    I communicated with ALBERTO Self, one of the providers for the primary team, about my assessment and recommendations verbally in-person.     Spent a total of 55 minutes including preparing for the consult, obtaining history, performing physical exam, documenting and communicating with co-providers.   Patient is a 69y old  Male who was admitted for chemotherapy but then had his treatment held due to fevers and found to have urosepsis with Klebsiella ESBL (Zosyn sensitive) and nasal corona virus.    HPI:  M 70 y/o M PMHx renal transplant 2012 (2/2 DM, s/p left nephrectomy), peripheral neuropathy, hypothyroidism, retroperitoneal fibrosis, HTN, HLD, GERD, anxiety/depression, ANGELIKA and recent admission at [facility] for sacral osteomyelitis and ESBL.coli urosepsis discharged on 12/18/24 to rehab. While admitted to [place] was noted to have hypercalcemia and liver masses which were biopsied on 12/16/24, biopsy revealed DLBCL. Patient received R mini CHOP on 12/28 now admitted for cycle #2 Rmini CHOP, upon admission patient was febrile so chemotherapy was held. He was found to have urosepsis with Klebsiella ESBL (Zosyn sensitive) and nasal corona virus. Plan by primary team includes discussing treatment with Tafa/Rev since his current rehab facility will not allow for transport to outpatient oncology to treat his lymphoma.     He was seen by PT on 1/18/25 and he declined to participate despite maximum encouragement as per the note. He said he feels weak and he is in pain when he has to bear weight especially on the LLE.    He lives in home with his wife who has MS. He said she is unable to take care of him. His son and daughter live nearby but they work and unable to watch him. He says they stay on the first floor and there are no stairs to enter. He has a wheelchair and a walker at home. The wheelchair can not fit into the bathroom. He is unsure of when he last walked. He says he moved minimally out of his bed when in the other hospital and in rehab. He needs assistance with ADLs. He said he was treated badly at the other rehab facility and does not want to return there. He declines ever going to a nursing home. He also states that he fell many times in the past.     Endorses neuropathy in bilateral LE and UE especially in his feet.     Imaging reviewed showed:  CAP CT: BONES: Left total hip arthroplasty. Degenerative changes. Erosive changes   within the sacrum with age-indeterminate right sacral fracture. Old right   eighth and 12th rib fractures. Extensive new bilateral groundglass nodular opacities, upper lobe   predominant. The etiology is unclear, question pneumonia. Small right and trace left pleural effusion.  Right hepatic mass is not well evaluated but appears decreased in size since 12/9/2024. Known  splenic mass is poorly seen without contrast. Confluent retroperitoneal soft tissue mass is unchanged, likely   retroperitoneal fibrosis.    REVIEW OF SYSTEMS: Negative unless stated in the HPI    VITALS  T(C): 37.4 (01-21-25 @ 09:00), Max: 38.4 (01-21-25 @ 05:48)  HR: 77 (01-21-25 @ 09:00) (72 - 89)  BP: 145/66 (01-21-25 @ 09:00) (127/68 - 158/62)  RR: 18 (01-21-25 @ 09:00) (18 - 20)  SpO2: 94% (01-21-25 @ 09:00) (92% - 94%)    PAST MEDICAL & SURGICAL HISTORY  Diabetes Mellitus Type II  ESRD on Dialysis s/p kidney transplanted  GERD (gastroesophageal reflux disease)  Depression  Hypothyroidism  Hyperlipidemia  Hypertension  ANGELIKA (obstructive sleep apnea)  Peripheral neuropathy  Shoulder fracture  Hypothyroidism  DLBCL (diffuse large B cell lymphoma)  Infectious disease  S/P cholecystectomy  Retroperitoneal fibrosis  AV fistula  S/P right cataract extraction  S/P left cataract extraction  H/O unilateral nephrectomy    SOCIAL HISTORY - as per documentation/history  His family includes: wife (Ludmila), daughter (Luz Marina) and son (Jose J).    FUNCTIONAL HISTORY  - as per documentation/history    CURRENT FUNCTIONAL STATUS: He currently needs assistance with ADLs and mobility. Patient was not able to participate with PT with their evaluation.     FAMILY HISTORY   MI (myocardial infarction)  Family history of obesity (Sibling)    RECENT LABS - Reviewed  CBC Full  -  ( 20 Jan 2025 06:57 )  WBC Count : 7.31 K/uL  RBC Count : 2.58 M/uL  Hemoglobin : 7.5 g/dL  Hematocrit : 23.9 %  Platelet Count - Automated : 485 K/uL  Mean Cell Volume : 92.6 fl  Mean Cell Hemoglobin : 29.1 pg  Mean Cell Hemoglobin Concentration : 31.4 g/dL  Auto Neutrophil # : 6.58 K/uL  Auto Lymphocyte # : 0.18 K/uL  Auto Monocyte # : 0.35 K/uL  Auto Eosinophil # : 0.03 K/uL  Auto Basophil # : 0.03 K/uL  Auto Neutrophil % : 90.0 %  Auto Lymphocyte % : 2.5 %  Auto Monocyte % : 4.8 %  Auto Eosinophil % : 0.4 %  Auto Basophil % : 0.4 %    01-20    134[L]  |  101  |  32[H]  ----------------------------<  52[LL]  4.8   |  23  |  1.15    Ca    8.9      20 Jan 2025 06:57  Phos  2.0     01-20  Mg     2.0     01-20    TPro  5.7[L]  /  Alb  2.6[L]  /  TBili  0.2  /  DBili  x   /  AST  51[H]  /  ALT  62[H]  /  AlkPhos  95  01-20    Urinalysis Basic - ( 20 Jan 2025 06:57 )    Color: x / Appearance: x / SG: x / pH: x  Gluc: 52 mg/dL / Ketone: x  / Bili: x / Urobili: x   Blood: x / Protein: x / Nitrite: x   Leuk Esterase: x / RBC: x / WBC x   Sq Epi: x / Non Sq Epi: x / Bacteria: x    ALLERGIES  No Known Allergies    MEDICATIONS   acetaminophen     Tablet .. 650 milliGRAM(s) Oral every 6 hours PRN  alteplase for catheter clearance 2 milliGRAM(s) Catheter once  alteplase for catheter clearance 2 milliGRAM(s) Catheter once  amLODIPine   Tablet 10 milliGRAM(s) Oral daily  buPROPion XL (24-Hour) . 300 milliGRAM(s) Oral daily  carvedilol 12.5 milliGRAM(s) Oral every 12 hours  chlorhexidine 4% Liquid 1 Application(s) Topical <User Schedule>  cloNIDine 0.1 milliGRAM(s) Oral three times a day  dextrose 5%. 1000 milliLiter(s) IV Continuous <Continuous>  dextrose 5%. 1000 milliLiter(s) IV Continuous <Continuous>  dextrose 50% Injectable 25 Gram(s) IV Push once  dextrose 50% Injectable 12.5 Gram(s) IV Push once  dextrose 50% Injectable 25 Gram(s) IV Push once  dextrose Oral Gel 15 Gram(s) Oral once PRN  enoxaparin Injectable 40 milliGRAM(s) SubCutaneous <User Schedule>  ertapenem  IVPB      ertapenem  IVPB 1000 milliGRAM(s) IV Intermittent every 24 hours  escitalopram 10 milliGRAM(s) Oral daily  glucagon  Injectable 1 milliGRAM(s) IntraMuscular once  glucagon  Injectable 1 milliGRAM(s) IntraMuscular once  hydrALAZINE 100 milliGRAM(s) Oral every 8 hours  insulin glargine Injectable (LANTUS) 38 Unit(s) SubCutaneous at bedtime  insulin lispro (ADMELOG) corrective regimen sliding scale   SubCutaneous three times a day before meals  insulin lispro (ADMELOG) corrective regimen sliding scale   SubCutaneous at bedtime  insulin lispro Injectable (ADMELOG) 8 Unit(s) SubCutaneous before breakfast  insulin lispro Injectable (ADMELOG) 8 Unit(s) SubCutaneous before lunch  insulin lispro Injectable (ADMELOG) 8 Unit(s) SubCutaneous before dinner  lactulose Syrup 15 Gram(s) Oral <User Schedule>  lactulose Syrup 10 Gram(s) Oral every 6 hours  levothyroxine 88 MICROGram(s) Oral daily  lisinopril 10 milliGRAM(s) Oral daily  nystatin    Suspension 070839 Unit(s) Oral every 6 hours  pantoprazole   Suspension 40 milliGRAM(s) Oral daily  polyethylene glycol 3350 17 Gram(s) Oral at bedtime  polyethylene glycol 3350 17 Gram(s) Oral daily PRN  predniSONE   Tablet 5 milliGRAM(s) Oral two times a day  rosuvastatin 10 milliGRAM(s) Oral at bedtime  senna 2 Tablet(s) Oral at bedtime  sodium chloride 0.9% lock flush 10 milliLiter(s) IV Push every 1 hour PRN  tacrolimus 1 milliGRAM(s) Oral <User Schedule>  tacrolimus 1 milliGRAM(s) Oral at bedtime  ----------------------------------------------------------------------------------------  PHYSICAL EXAM  Constitutional - Frustrated, Comfortable  HEENT - NCAT  Chest - Breathing comfortably, No wheezing  Abdomen - Soft   Extremities - No calf tenderness, Able to move bilateral UE and LE greater than antigravity in the bed.   Neurologic Exam -                    Cognitive - Awake, Alert     Communication - Fluent     Sensory - Decreased to light touch in bilateral LE and UE especially in the feet  Psychiatric - Mood stable but frustrated and upset  ----------------------------------------------------------------------------------------  ASSESSMENT/PLAN  69yMale with functional deficits after urosepsis with Klebsiella ESBL (Zosyn sensitive) and nasal corona virus.     #Bilateral LE pain (Left worse than Right)  -He has pain when he bears weight especially on the LLE. He has history of prior falls and prior left hip fracture s/p hip arthroplasty. He also has history of DLBCL. I spoke to the primary team and recommend first getting a left hip x-ray to check the integrity of his left hip   arthroplasty and also to rule out pathologic fracture due to his cancer history.   -Recommend medications for pain management. Could consider standing Tylenol but primary team would need to monitor liver enzymes since his liver enzymes are mildly elevated. Lymphoma can cause bone pain which can be managed with steroids, NSAIDs and opioids. He is already on predniSONE Tablet 5 milliGRAM(s) Oral two times a day. I do not recommend NSAIDs due to his anemia and eventual plan to resume cancer-related treatment. Could consider starting with oxycodone 5 mg q6hr PRN. Monitor for constipation if the patient gets started on oxycodone. He is currently on a bowel regimen. Once pathologic fracture is ruled out then recommend giving patient pain medication prior to PT to increase success of tolerance of therapy.    #Chemotherapy-induced peripheral neuropathy  -This can be painful for some patient and could also be contributing to his pain. Could consider Gabapentin 300 mg TID. Could also consider topical medications such as lidocaine or capsaicin cream PRN as long as he has no open wounds or skin infections.     #Difficulty with mobility and ADLs  -Patient is on enoxaparin for DVT ppx  -This is multifactorial and could be secondary to being deconditioned after minimal mobility during hospitalization, pain due to his lymphoma, cancer-related treatment and/or potentially an issue with his left hip arthroplasty and/or possible pathologic fracture.   -Recommend first getting a left hip x-ray to check the integrity of his left hip arthroplasty and also to rule out pathologic fracture due to his cancer history.   -Recommend medications for pain management as listed above listed under the Bilateral LE pain   -If no improvement or slow improvement then could consider an EMG/NCS outpatient  -Patient has anemia and this will need to be monitored. If his hemoglobin drops below 7 then he should not participate in therapy.  -Unable to provide recommendations for rehab disposition due to patient not participating with therapy. I also discussed with the primary team about their plan for the patient's cancer-related treatment and once this has been decided along with the patient participating with therapy then I can reach out to my team to ask if it is possible to have concurrent cancer-related treatment with rehab. Patient's wife can not take care of him at home and his kids can not always be there since they work. Patient is not interested in a nursing home. We would also need to know what the home plan would be after discharge from rehab. Due to his prior history of multiple falls, he may not be safe at home by himself. He also doesn't want to return to the prior rehab facility because he said he was treated badly.   -Will continue to follow. Functional progress will determine ongoing rehab dispo recommendations, which may change.    I communicated with ALBERTO Self, one of the providers for the primary team, about my assessment and recommendations verbally in-person.     Spent a total of 55 minutes including preparing for the consult, obtaining history, performing physical exam, documenting and communicating with co-providers.

## 2025-01-21 NOTE — DISCHARGE NOTE PROVIDER - NSDCMRMEDTOKEN_GEN_ALL_CORE_FT
buPROPion 300 mg/24 hours (XL) oral tablet, extended release: 1 tab(s) orally once a day  cloNIDine 0.1 mg oral tablet: 1 tab(s) orally 3 times a day hold for SBP less than 100, HR&lt;60  Coreg 12.5 mg oral tablet: 1 tab(s) orally every 12 hours  Crestor 10 mg oral tablet: 1 tab(s) orally once a day (at bedtime)  escitalopram 10 mg oral tablet: 1 tab(s) orally once a day  hydrALAZINE 100 mg oral tablet: 1 tab(s) orally every 8 hours  Insulin Glargine: 38 unit(s) subcutaneous once a day (at bedtime)  levothyroxine 88 mcg (0.088 mg) oral tablet: 1 tab(s) orally once a day  lisinopril 10 mg oral tablet: 1 tab(s) orally once a day  PICC care: normal saline 10 millilters to each lumen weekly with dressing changes     x 6 months  polyethylene glycol 3350 oral powder for reconstitution: 17 gram(s) orally as needed for  constipation  predniSONE 20 mg oral tablet: 1 tab(s) orally once a day  tacrolimus 1 mg oral capsule: 1 cap(s) orally once a day (in the morning)  tacrolimus 1 mg oral capsule: 1 cap(s) orally once a day (in the evening)   atovaquone 750 mg/5 mL oral suspension: 5 milliliter(s) orally every 12 hours Continue until 2/13  buPROPion 300 mg/24 hours (XL) oral tablet, extended release: 1 tab(s) orally once a day  cloNIDine 0.1 mg oral tablet: 1 tab(s) orally 3 times a day hold for SBP less than 100, HR&lt;60  Coreg 12.5 mg oral tablet: 1 tab(s) orally every 12 hours  Crestor 10 mg oral tablet: 1 tab(s) orally once a day (at bedtime)  escitalopram 10 mg oral tablet: 1 tab(s) orally once a day  hydrALAZINE 100 mg oral tablet: 1 tab(s) orally every 8 hours  Insulin Glargine: 38 unit(s) subcutaneous once a day (at bedtime)  levothyroxine 88 mcg (0.088 mg) oral tablet: 1 tab(s) orally once a day  lisinopril 10 mg oral tablet: 1 tab(s) orally once a day  PICC care: normal saline 10 millilters to each lumen weekly with dressing changes     x 6 months  polyethylene glycol 3350 oral powder for reconstitution: 17 gram(s) orally as needed for  constipation  predniSONE 20 mg oral tablet: 1 tab(s) orally once a day  Protonix 40 mg oral delayed release tablet: 1 tab(s) orally once a day (before a meal)  tacrolimus 1 mg oral capsule: 1 cap(s) orally once a day (in the morning)  tacrolimus 1 mg oral capsule: 1 cap(s) orally once a day (in the evening)  valACYclovir 500 mg oral tablet: 1 tab(s) orally every 12 hours   Admelog SoloStar 100 units/mL injectable solution: 16 unit(s) injectable once a day before breakfast  Admelog SoloStar 100 units/mL injectable solution: 16 unit(s) injectable once a day before lunch  Admelog SoloStar 100 units/mL injectable solution: 13 unit(s) injectable once a day before dinner  atovaquone 750 mg/5 mL oral suspension: 5 milliliter(s) orally every 12 hours Continue until 2/13 then can resume bactrim ppx and monitor K and renal function  buPROPion 300 mg/24 hours (XL) oral tablet, extended release: 1 tab(s) orally once a day  cloNIDine 0.1 mg oral tablet: 1 tab(s) orally 3 times a day hold for SBP less than 100, HR&lt;60  Coreg 12.5 mg oral tablet: 1 tab(s) orally every 12 hours  Crestor 10 mg oral tablet: 1 tab(s) orally once a day (at bedtime)  escitalopram 10 mg oral tablet: 1 tab(s) orally once a day  hydrALAZINE 100 mg oral tablet: 1 tab(s) orally every 8 hours  insulin glargine 100 units/mL subcutaneous solution: 8 unit(s) subcutaneous once a day (at bedtime)  levothyroxine 88 mcg (0.088 mg) oral tablet: 1 tab(s) orally once a day  lisinopril 10 mg oral tablet: 1 tab(s) orally once a day  PICC care: normal saline 10 millilters to each lumen weekly with dressing changes     x 6 months  polyethylene glycol 3350 oral powder for reconstitution: 17 gram(s) orally once a day as needed for  constipation  predniSONE 20 mg oral tablet: 1 tab(s) orally once a day until 2/13  Protonix 40 mg oral delayed release tablet: 1 tab(s) orally once a day (before a meal)  tacrolimus 1 mg oral capsule: 1 cap(s) orally once a day (in the morning)  tacrolimus 1 mg oral capsule: 1 cap(s) orally once a day (in the evening)  valACYclovir 500 mg oral tablet: 1 tab(s) orally every 12 hours   Admelog SoloStar 100 units/mL injectable solution: 16 unit(s) injectable once a day before breakfast  Admelog SoloStar 100 units/mL injectable solution: 16 unit(s) injectable once a day before lunch  Admelog SoloStar 100 units/mL injectable solution: 13 unit(s) injectable once a day before dinner  atovaquone 750 mg/5 mL oral suspension: 5 milliliter(s) orally every 12 hours Continue until 2/13 then can resume bactrim ppx and monitor K and renal function  Bactrim  mg-160 mg oral tablet: 1 tab(s) orally once a day starting on 2/14/25  buPROPion 300 mg/24 hours (XL) oral tablet, extended release: 1 tab(s) orally once a day  cloNIDine 0.1 mg oral tablet: 1 tab(s) orally 3 times a day hold for SBP less than 100, HR&lt;60  Coreg 12.5 mg oral tablet: 1 tab(s) orally every 12 hours  Crestor 10 mg oral tablet: 1 tab(s) orally once a day (at bedtime)  escitalopram 10 mg oral tablet: 1 tab(s) orally once a day  hydrALAZINE 100 mg oral tablet: 1 tab(s) orally every 8 hours  insulin glargine 100 units/mL subcutaneous solution: 8 unit(s) subcutaneous once a day (at bedtime)  levothyroxine 88 mcg (0.088 mg) oral tablet: 1 tab(s) orally once a day  PICC care: normal saline 10 millilters to each lumen weekly with dressing changes     x 6 months  polyethylene glycol 3350 oral powder for reconstitution: 17 gram(s) orally once a day as needed for  constipation  predniSONE 10 mg oral tablet: 10 milligram(s) orally once a day Starting 2/14/25  predniSONE 20 mg oral tablet: 1 tab(s) orally once a day until 2/13  Protonix 40 mg oral delayed release tablet: 1 tab(s) orally once a day (before a meal)  tacrolimus 1 mg oral capsule: 1 cap(s) orally once a day (in the morning) starting on 2/14/25  tacrolimus 1 mg oral capsule: 1 cap(s) orally once a day (in the evening) starting on 2/14/25  valACYclovir 500 mg oral tablet: 1 tab(s) orally every 12 hours   Admelog SoloStar 100 units/mL injectable solution: 24 unit(s) injectable once a day before breakfast  Admelog SoloStar 100 units/mL injectable solution: 18 unit(s) injectable once a day before lunch  Admelog SoloStar 100 units/mL injectable solution: 14 unit(s) injectable once a day before dinner  atovaquone 750 mg/5 mL oral suspension: 5 milliliter(s) orally every 12 hours Continue until 2/13 then can resume bactrim ppx and monitor K and renal function  Bactrim  mg-160 mg oral tablet: 1 tab(s) orally once a day starting on 2/14/25  buPROPion 300 mg/24 hours (XL) oral tablet, extended release: 1 tab(s) orally once a day  cloNIDine 0.1 mg oral tablet: 1 tab(s) orally 3 times a day hold for SBP less than 100, HR&lt;60  Coreg 12.5 mg oral tablet: 1 tab(s) orally every 12 hours  Crestor 10 mg oral tablet: 1 tab(s) orally once a day (at bedtime)  escitalopram 10 mg oral tablet: 1 tab(s) orally once a day  hydrALAZINE 100 mg oral tablet: 1 tab(s) orally every 8 hours  insulin glargine 100 units/mL subcutaneous solution: 11 unit(s) subcutaneous once a day (at bedtime)  levothyroxine 88 mcg (0.088 mg) oral tablet: 1 tab(s) orally once a day  PICC care: normal saline 10 millilters to each lumen weekly with dressing changes     x 6 months  polyethylene glycol 3350 oral powder for reconstitution: 17 gram(s) orally once a day as needed for  constipation  predniSONE 10 mg oral tablet: 10 milligram(s) orally once a day Starting 2/14/25  predniSONE 20 mg oral tablet: 1 tab(s) orally once a day until 2/13  Protonix 40 mg oral delayed release tablet: 1 tab(s) orally once a day (before a meal)  tacrolimus 1 mg oral capsule: 1 cap(s) orally once a day (in the morning) starting on 2/14/25  tacrolimus 1 mg oral capsule: 1 cap(s) orally once a day (in the evening) starting on 2/14/25  valACYclovir 500 mg oral tablet: 1 tab(s) orally every 12 hours

## 2025-01-21 NOTE — PROGRESS NOTE ADULT - ASSESSMENT
Patient is a 69 year old male with PMH of renal transplant 2012 (2/2 DM, s/p left nephrectomy), peripheral neuropathy, hypothyroidism, retroperitoneal fibrosis, HTN, HLD, GERD, anxiety/depression, ANGELIKA and recent admission at [facility] for ESBL E.coli urosepsis, imaging with ?sacral OM though pt with no sacral wound suspected findings likely related to mets, he was discharged on 12/18/24 to rehab, recently diagnosed DLBCL while admitted to [place] when he was noted to have hypercalcemia and liver masses s/p biopsy 12/16/24, now s/p R mini CHOP on 12/28 who was admitted 1/17 for cycle #2 Rmini CHOP, upon admission patient is febrile will hold chemotherapy now.    Fevers- may be d/t viral URI d/t coronavirus (not COVID) vs possible post viral pneumonia, may be malignancy related    Possible UTI vs contamination   Oral thrush   - temps noted Tm 101F overnight, WBC remains wnl, no neutropenia, on RA  - RVP with Coronavirus (229E,HKU1,NL63,OC43) detected  - CT Chest with extensive new b/l ground glass nodular opacities, upper lobe dominant - possible pneumonia; small R and trace L effusions  - CTAP decreased R hepatic mass since 12/9/24; known splenic mass; changes likely c/w retroperitoneal fibrosis   - UA with some pyuria, small LE, no nitrites or bacteria  - Ucx with >3 organisms - may be contaminated specimen    - 1/19 repeat Ucx with only low colony count of C.albicans, likely contaminant, no need to treat  - no hodges, condom cath noted with clear yellow urine, no gu sx, no suprapubic ttp  - Bcx NGTD (72h)  - LUE PICC line with no sign of infection, no sign of SSTI, no rashes noted   - wound care eval noted for sacral and ischial DTI  - prior cultures reviewed, history of ESBL Kleb in Ucx 12/31/24, ESBL E.coli 11/29/24 Ucx   - s/p zosyn 1/17-1/20, escalated to ertapenem 1/20pm due to fevers     Recommendations:   Follow 1/19 Bcx - NGTD x2   Check MRSA PCR screen, ordered    Continue ertapenem 1g IV Q24h   Monitor temps/CBC   Supportive care  Aspiration precautions  Wound care, offloading as able, nutrition  Continue rest of care per primary team       Risa Ac M.D.  [facility]  Available on [employer] TEAMS - *PREFERRED*  [phone]  After 5pm on weekdays and all day on weekends - please call [phone]     Thank you for consulting us and involving us in the management of this patients case. In addition to reviewing history, imaging, documents, labs, microbiology, took into account antibiotic stewardship, local antibiogram and infection control strategies and potential transmission issues.

## 2025-01-21 NOTE — DISCHARGE NOTE PROVIDER - CARE PROVIDER_API CALL
faxed   Kervin Garcia  Medical Oncology  [address] [place] [id number]  Phone: [phone]  Fax: [phone]  Follow Up Time:

## 2025-01-21 NOTE — DISCHARGE NOTE PROVIDER - NSDCCAREPROVSEEN_GEN_ALL_CORE_FT
Vicente Mixon, Faith Delong Vicente Mixon, Kervin Leos, Zuleyka Cxo Vicente Mixon, Kervin Leos, Faith Anguiano, Zuleyka  [facility] Team 5

## 2025-01-21 NOTE — PROGRESS NOTE ADULT - PROBLEM SELECTOR PLAN 2
Not neutropenic.  1/17 - F/u BCX  1/17 - F/U CT C/A/P- Extensive new bilateral ground glass nodular opacities, upper lobe predominant. The etiology is unclear, question pneumonia. Small right and trace left pleural effusion. Right hepatic mass is not well evaluated but appears decreased in size  since 12/9/2024. Known splenic mass is poorly seen without contrast.  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  1/17- F/u Flu/COVID PCR , + for Coronavirus   (last hospitalization: osteomyelitis and E coli urosepsis (12/1/24 - 12/18/24)   [facility] (12/18/24): for osteomyelitis & E coli urosepsis).  [facility] on 1/20  Zosyn O/N  1/18 - Oral candidiasis - Nystatin S/S  1/19- ID consult, followed by [facility], Zosyn changed to Ertapenem  history of ESBL Kleb in Ucx 12/31/24, ESBL E.coli 11/29/24 Ucx

## 2025-01-21 NOTE — PROGRESS NOTE ADULT - ASSESSMENT
M 70 y/o M PMHx renal transplant 2012 (2/2 DM, s/p left nephrectomy), peripheral neuropathy, hypothyroidism, retroperitoneal fibrosis, HTN, HLD, GERD, anxiety/depression, ANGELIKA and recent admission at [facility] for sacral osteomyelitis and ESBL.coli urosepsis discharged on 12/18/24 to rehab. While admitted to [place] was noted to have hypercalcemia and liver masses which were biopsied on 12/16/24, biopsy revealed DLBCL. Patient received R mini CHOP on 12/28 now admitted for cycle #2 Rmini CHOP, upon admission patient is febrile will hold chemotherapy today.

## 2025-01-21 NOTE — DISCHARGE NOTE PROVIDER - HOSPITAL COURSE
68 y/o M PMHx renal transplant 2012 (2/2 DM, s/p left nephrectomy), peripheral neuropathy, hypothyroidism, retroperitoneal fibrosis, HTN, HLD, GERD, anxiety/depression, ANGELIKA and recent admission at [facility] for sacral osteomyelitis and ESBL.coli urosepsis discharged on 12/18/24 to rehab. While admitted to [place] was noted to have hypercalcemia and liver masses which were biopsied on 12/16/24, biopsy revealed DLBCL. Patient received R mini CHOP on 12/28 now admitted for cycle #2 Rmini CHOP, upon admission patient is febrile will hold chemotherapy     He was found to have urosepsis with Klebsiella ESBL (Zosyn sensitive) and nasal corona virus. Plan by primary team includes discussing treatment with Tafa/Rev since his current rehab facility will not allow for transport to outpatient oncology to treat his lymphoma.     He was seen by PT on 1/18/25 and he declined to participate despite maximum encouragement as per the note. He said he feels weak and he is in pain when he has to bear weight especially on the LLE.    He lives in home with his wife who has MS. He said she is unable to take care of him. His son and daughter live nearby but they work and unable to watch him. He says they stay on the first floor and there are no stairs to enter. He has a wheelchair and a walker at home. The wheelchair can not fit into the bathroom. He is unsure of when he last walked. He says he moved minimally out of his bed when in the other hospital and in rehab. He needs assistance with ADLs. He said he was treated badly at the other rehab facility and does not want to return there. He declines ever going to a nursing home. He also states that he fell many times in the past.     Endorses neuropathy in bilateral LE and UE especially in his feet.     Imaging reviewed showed:  CAP CT: BONES: Left total hip arthroplasty. Degenerative changes. Erosive changes   within the sacrum with age-indeterminate right sacral fracture. Old right   eighth and 12th rib fractures. Extensive new bilateral groundglass nodular opacities, upper lobe   predominant. The etiology is unclear, question pneumonia. Small right and trace left pleural effusion.  Right hepatic mass is not well evaluated but appears decreased in size since 12/9/2024. Known  splenic mass is poorly seen without contrast. Confluent retroperitoneal soft tissue mass is unchanged, likely   retroperitoneal fibrosis.   70 y/o M PMHx renal transplant 2012 (2/2 DM, s/p left nephrectomy), peripheral neuropathy, hypothyroidism, retroperitoneal fibrosis, HTN, HLD, GERD, anxiety/depression, ANGELIKA and recent admission at [facility] for sacral osteomyelitis and ESBL.coli urosepsis discharged on 12/18/24 to rehab. While admitted to [place] was noted to have hypercalcemia and liver masses which were biopsied on 12/16/24, biopsy revealed DLBCL. Patient received R mini CHOP on 12/28 now admitted for cycle #2 Rmini CHOP, upon admission patient is febrile will hold chemotherapy     He was found to have urosepsis with Klebsiella ESBL (Zosyn sensitive) and nasal corona virus. Plan by primary team includes discussing treatment with Tafa/Rev since his current rehab facility will not allow for transport to outpatient oncology to treat his lymphoma. However, plan for tafasitamab/Revlimid HELD in lieu of CT chest 1/23 concerning for PCP/PJC Pneumonia with bilateral diffuse opacities and Fungitell > 500. Patient was started on IV Bactrim 320 mg daily + 5 days Prednisone 40mg. Patient started on High Flow nasal cannula for better oxygenation. Further complicated by steroid induced hyperglycemia, endocrinology consulted and assisted with insulin adjustments.      He lives in home with his wife who has MS. He said she is unable to take care of him. His son and daughter live nearby but they work and unable to watch him. He says they stay on the first floor and there are no stairs to enter. He has a wheelchair and a walker at home. The wheelchair can not fit into the bathroom. He is unsure of when he last walked. He says he moved minimally out of his bed when in the other hospital and in rehab. He needs assistance with ADLs. He said he was treated badly at the other rehab facility and does not want to return there. He declines ever going to a nursing home. He also states that he fell many times in the past. He was seen by PT on 1/18/25 and he declined to participate despite maximum encouragement as per the note. He said he feels weak and he is in pain when he has to bear weight especially on the LLE.    Endorses neuropathy in bilateral LE and UE especially in his feet.     Imaging reviewed showed:  CAP CT: BONES: Left total hip arthroplasty. Degenerative changes. Erosive changes   within the sacrum with age-indeterminate right sacral fracture. Old right   eighth and 12th rib fractures. Extensive new bilateral groundglass nodular opacities, upper lobe   predominant. The etiology is unclear, question pneumonia. Small right and trace left pleural effusion.  Right hepatic mass is not well evaluated but appears decreased in size since 12/9/2024. Known  splenic mass is poorly seen without contrast. Confluent retroperitoneal soft tissue mass is unchanged, likely   retroperitoneal fibrosis.   68 y/o M PMHx renal transplant 2012 (2/2 DM, s/p left nephrectomy), peripheral neuropathy, hypothyroidism, retroperitoneal fibrosis, HTN, HLD, GERD, anxiety/depression, ANGELIKA and recent admission at [facility] for sacral osteomyelitis and ESBL.coli urosepsis discharged on 12/18/24 to rehab. [facility] was noted to have hypercalcemia and liver masses which were biopsied on 12/16/24, biopsy revealed DLBCL. Patient received R mini CHOP on 12/28 now admitted for cycle #2 Rmini CHOP, upon admission patient is febrile will hold chemotherapy     He was found to have urosepsis with Klebsiella ESBL (Zosyn sensitive) and nasal corona virus. Plan by primary team includes discussing treatment with Tafa/Rev since his current rehab facility will not allow for transport to outpatient oncology to treat his lymphoma. However, plan for tafasitamab/Revlimid HELD in lieu of CT chest 1/23 concerning for PCP/PJC Pneumonia with bilateral diffuse opacities and Fungitell > 500. Patient was started on IV Bactrim 320 mg daily + 5 days Prednisone 40mg. Patient started on High Flow nasal cannula for better oxygenation. Further complicated by steroid induced hyperglycemia, endocrinology consulted and assisted with insulin adjustments.  Respiratory status progressively improved with Bactrim, planned for 21 day course. On 2/2, patient became hyperkalemic with ECG revealing peaked T waves, was treated medically with improvement, and monitored on telemetry.      Imaging reviewed showed:  CAP CT: BONES: Left total hip arthroplasty. Degenerative changes. Erosive changes   within the sacrum with age-indeterminate right sacral fracture. Old right   eighth and 12th rib fractures. Extensive new bilateral groundglass nodular opacities, upper lobe   predominant. The etiology is unclear, question pneumonia. Small right and trace left pleural effusion.  Right hepatic mass is not well evaluated but appears decreased in size since 12/9/2024. Known  splenic mass is poorly seen without contrast. Confluent retroperitoneal soft tissue mass is unchanged, likely   retroperitoneal fibrosis.   70 y/o M PMHx renal transplant 2012 (2/2 DM, s/p left nephrectomy), peripheral neuropathy, hypothyroidism, retroperitoneal fibrosis, HTN, HLD, GERD, anxiety/depression, ANGELIKA and recent admission at [facility] for sacral osteomyelitis and ESBL.coli urosepsis discharged on 12/18/24 to rehab. While admitted to [place] was noted to have hypercalcemia and liver masses which were biopsied on 12/16/24, biopsy revealed DLBCL. Patient received R mini CHOP on 12/28 now admitted for cycle #2 Rmini CHOP, upon admission patient is febrile will hold chemotherapy.   Imaging reviewed showed:  CAP CT: BONES: Left total hip arthroplasty. Degenerative changes. Erosive changes   within the sacrum with age-indeterminate right sacral fracture. Old right   eighth and 12th rib fractures. Extensive new bilateral groundglass nodular opacities, upper lobe   predominant. The etiology is unclear, question pneumonia. Small right and trace left pleural effusion.  Right hepatic mass is not well evaluated but appears decreased in size since 12/9/2024. Known  splenic mass is poorly seen without contrast. Confluent retroperitoneal soft tissue mass is unchanged, likely   retroperitoneal fibrosis.     He was found to have urosepsis with Klebsiella ESBL (Zosyn sensitive) and nasal corona virus. Plan by primary team includes discussing treatment with Tafa/Rev since his current rehab facility will not allow for transport to outpatient oncology to treat his lymphoma. However, plan for tafasitamab/Revlimid HELD in lieu of CT chest 1/23 concerning for PCP/PJC Pneumonia with bilateral diffuse opacities and Fungitell > 500. Patient was started on IV Bactrim 320 mg daily + 5 days Prednisone 40mg. Patient started on High Flow nasal cannula for better oxygenation. Further complicated by steroid induced hyperglycemia, endocrinology consulted and assisted with insulin adjustments.  Respiratory status progressively improved with Bactrim, planned for 21 day course. On 2/2, patient became hyperkalemic with ECG revealing peaked T waves, was treated medically with improvement, and monitored on telemetry. Patient will was able to be weaned down to 2L NC and will continue on atovaquone 750 mg Q12 for total 21D course and steroid taper with pred 20 mg daily until 2/13, per ID then can trial on bactrim DS 1 tablet PO daily for ppx, if any electrolytes issues then will use atovaquone 1500 mg daily    Important medications:   Atovaquone 750 mg Q12 for total 21D course until 2/13 and prednisone 20 mg until 2/13  per ID then can trial on bactrim DS 1 tablet PO daily for ppx, if any electrolytes issues then will use atovaquone 1500 mg daily    Follow up:   heme onc and primary care within 1-2 weeks of discharge         68 y/o M PMHx renal transplant 2012 (2/2 DM, s/p left nephrectomy), peripheral neuropathy, hypothyroidism, retroperitoneal fibrosis, HTN, HLD, GERD, anxiety/depression, ANGELIKA and recent admission at [facility] for sacral osteomyelitis and ESBL.coli urosepsis discharged on 12/18/24 to rehab. While admitted to [place] was noted to have hypercalcemia and liver masses which were biopsied on 12/16/24, biopsy revealed DLBCL. Patient received R mini CHOP on 12/28 now admitted for cycle #2 Rmini CHOP, upon admission patient is febrile will hold chemotherapy.   Imaging reviewed showed: CAP CT: BONES: Left total hip arthroplasty. Degenerative changes. Erosive changes within the sacrum with age-indeterminate right sacral fracture. Old right eighth and 12th rib fractures. Extensive new bilateral groundglass nodular opacities, upper lobe predominant. The etiology is unclear, question pneumonia. Small right and trace left pleural effusion.  Right hepatic mass is not well evaluated but appears decreased in size since 12/9/2024. Known splenic mass is poorly seen without contrast. 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     He was found to have urosepsis with Klebsiella ESBL (Zosyn sensitive) and nasal corona virus. Plan by primary team includes discussing treatment with Tafa/Rev since his current rehab facility will not allow for transport to outpatient oncology to treat his lymphoma. However, plan for tafasitamab/Revlimid HELD in lieu of CT chest 1/23 concerning for PCP/PJC Pneumonia with bilateral diffuse opacities and Fungitell > 500. Patient was started on IV Bactrim 320 mg daily + 5 days Prednisone 40mg. Patient started on High Flow nasal cannula for better oxygenation. Further complicated by steroid induced hyperglycemia, endocrinology consulted and assisted with insulin adjustments.  Respiratory status progressively improved with Bactrim, planned for 21 day course. On 2/2, patient became hyperkalemic with ECG revealing peaked T waves, was treated medically with improvement, and monitored on telemetry. Patient will was able to be weaned down to 2L NC and will continue on atovaquone 750 mg Q12 for total 21D course and steroid taper with pred 20 mg daily until 2/13, followed by prednisone 10 mg daily from 2/14 onwards. Per ID then can trial on bactrim DS 1 tablet PO daily for ppx, if any electrolytes issues then will use atovaquone 1500 mg daily    Important medications:   Holding lisinopril due to DIMITRIOS during admission, follow up with PCP and transplant nephrology regarding resuming  Atovaquone 750 mg Q12 for total 21D course until 2/13 and prednisone 20 mg until 2/13  start prednisone 10 mg daily on 2/14  per ID then can trial on bactrim DS 1 tablet PO daily for ppx, if any electrolytes issues then will use atovaquone 1500 mg daily  Resume tacrolimus 1 mg BID on 2/14    Follow up:   heme onc, transplant nephrology, and primary care within 1-2 weeks of discharge         70 y/o M PMHx renal transplant 2012 (2/2 DM, s/p left nephrectomy), peripheral neuropathy, hypothyroidism, retroperitoneal fibrosis, HTN, HLD, GERD, anxiety/depression, ANGELIKA and recent admission at [facility] for sacral osteomyelitis and ESBL.coli urosepsis discharged on 12/18/24 to rehab. While admitted to [place] was noted to have hypercalcemia and liver masses which were biopsied on 12/16/24, biopsy revealed DLBCL. Patient received R mini CHOP on 12/28 now admitted for cycle #2 Rmini CHOP, upon admission patient is febrile will hold chemotherapy.   Imaging reviewed showed: CAP CT: BONES: Left total hip arthroplasty. Degenerative changes. Erosive changes within the sacrum with age-indeterminate right sacral fracture. Old right eighth and 12th rib fractures. Extensive new bilateral groundglass nodular opacities, upper lobe predominant. The etiology is unclear, question pneumonia. Small right and trace left pleural effusion.  Right hepatic mass is not well evaluated but appears decreased in size since 12/9/2024. Known splenic mass is poorly seen without contrast. 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     He was found to have urosepsis with Klebsiella ESBL (Zosyn sensitive) and nasal corona virus. Plan by primary team includes discussing treatment with Tafa/Rev since his current rehab facility will not allow for transport to outpatient oncology to treat his lymphoma. However, plan for tafasitamab/Revlimid HELD in lieu of CT chest 1/23 concerning for PCP/PJC Pneumonia with bilateral diffuse opacities and Fungitell > 500. Patient was started on IV Bactrim 320 mg daily + 5 days Prednisone 40mg. Patient started on High Flow nasal cannula for better oxygenation. Further complicated by steroid induced hyperglycemia, endocrinology consulted and assisted with insulin adjustments.  Respiratory status progressively improved with Bactrim, planned for 21 day course. On 2/2, patient became hyperkalemic with ECG revealing peaked T waves, was treated medically with improvement, and monitored on telemetry. Patient will was able to be weaned down to 2L NC and will continue on atovaquone 750 mg Q12 for total 21D course and steroid taper with pred 20 mg daily until 2/13, followed by prednisone 10 mg daily from 2/14 onwards. Per ID then can trial on bactrim DS 1 tablet PO daily for ppx, if any electrolytes issues then will use atovaquone 1500 mg daily. On day of discharge patient was medically stable and ready for discharge.     Important medications:   Holding lisinopril due to DIMITRIOS during admission, follow up with PCP and transplant nephrology regarding resuming  Atovaquone 750 mg Q12 for total 21D course until 2/13 and prednisone 20 mg until 2/13  start prednisone 10 mg daily on 2/14  per ID then can trial on bactrim DS 1 tablet PO daily for ppx, if any electrolytes issues then will use atovaquone 1500 mg daily  Resume tacrolimus 1 mg BID on 2/14  Continue to titrate insulin based on pred taper    Follow up:   heme onc, transplant nephrology, and primary care within 1-2 weeks of discharge  f/u with heme onc regarding PICC care

## 2025-01-21 NOTE — DISCHARGE NOTE PROVIDER - DETAILS OF MALNUTRITION DIAGNOSIS/DIAGNOSES
This patient has been assessed with a concern for Malnutrition and was treated during this hospitalization for the following Nutrition diagnosis/diagnoses:     -  01/18/2025: Moderate protein-calorie malnutrition

## 2025-01-21 NOTE — PROGRESS NOTE ADULT - SUBJECTIVE AND OBJECTIVE BOX
Diagnosis: PTLD    Protocol/Chemo Regimen: (On hold)    Pt endorsed:  +fatigue  sacral pain, discomfort    Review of Systems:   Denies any nausea, vomiting chest pain, palpitation, SOB, abdominal pain.     Pain scale: sacral area pain, discomfort    Diet: Consistent carb    Allergies: No Known Allergies    ANTIMICROBIALS  ertapenem  IVPB 1000 milliGRAM(s) IV Intermittent every 24 hours  nystatin    Suspension 227843 Unit(s) Oral every 6 hours    HEME/ONC MEDICATIONS  alteplase for catheter clearance 2 milliGRAM(s) Catheter once  alteplase for catheter clearance 2 milliGRAM(s) Catheter once  enoxaparin Injectable 40 milliGRAM(s) SubCutaneous <User Schedule>    STANDING MEDICATIONS  amLODIPine   Tablet 10 milliGRAM(s) Oral daily  buPROPion XL (24-Hour) . 300 milliGRAM(s) Oral daily  carvedilol 12.5 milliGRAM(s) Oral every 12 hours  chlorhexidine 4% Liquid 1 Application(s) Topical <User Schedule>  cloNIDine 0.1 milliGRAM(s) Oral three times a day  dextrose 5%. 1000 milliLiter(s) IV Continuous <Continuous>  dextrose 5%. 1000 milliLiter(s) IV Continuous <Continuous>  dextrose 50% Injectable 25 Gram(s) IV Push once  dextrose 50% Injectable 12.5 Gram(s) IV Push once  dextrose 50% Injectable 25 Gram(s) IV Push once  escitalopram 10 milliGRAM(s) Oral daily  glucagon  Injectable 1 milliGRAM(s) IntraMuscular once  glucagon  Injectable 1 milliGRAM(s) IntraMuscular once  hydrALAZINE 100 milliGRAM(s) Oral every 8 hours  insulin glargine Injectable (LANTUS) 38 Unit(s) SubCutaneous at bedtime  insulin lispro (ADMELOG) corrective regimen sliding scale   SubCutaneous three times a day before meals  insulin lispro (ADMELOG) corrective regimen sliding scale   SubCutaneous at bedtime  insulin lispro Injectable (ADMELOG) 8 Unit(s) SubCutaneous before breakfast  insulin lispro Injectable (ADMELOG) 8 Unit(s) SubCutaneous before lunch  insulin lispro Injectable (ADMELOG) 8 Unit(s) SubCutaneous before dinner  lactulose Syrup 15 Gram(s) Oral <User Schedule>  lactulose Syrup 10 Gram(s) Oral every 6 hours  levothyroxine 88 MICROGram(s) Oral daily  lisinopril 10 milliGRAM(s) Oral daily  pantoprazole   Suspension 40 milliGRAM(s) Oral daily  polyethylene glycol 3350 17 Gram(s) Oral at bedtime  predniSONE   Tablet 5 milliGRAM(s) Oral two times a day  rosuvastatin 10 milliGRAM(s) Oral at bedtime  senna 2 Tablet(s) Oral at bedtime  tacrolimus 1 milliGRAM(s) Oral <User Schedule>  tacrolimus 1 milliGRAM(s) Oral at bedtime    PRN MEDICATIONS  acetaminophen     Tablet .. 650 milliGRAM(s) Oral every 6 hours PRN  dextrose Oral Gel 15 Gram(s) Oral once PRN  polyethylene glycol 3350 17 Gram(s) Oral daily PRN  sodium chloride 0.9% lock flush 10 milliLiter(s) IV Push every 1 hour PRN      Vital Signs Last 24 Hrs  T(C): 37.4 (21 Jan 2025 09:00), Max: 38.4 (21 Jan 2025 05:48)  T(F): 99.3 (21 Jan 2025 09:00), Max: 101.1 (21 Jan 2025 05:48)  HR: 77 (21 Jan 2025 09:00) (73 - 89)  BP: 145/66 (21 Jan 2025 09:00) (145/66 - 158/62)  RR: 18 (21 Jan 2025 09:00) (18 - 20)  SpO2: 94% (21 Jan 2025 09:00) (92% - 94%)    Parameters below as of 21 Jan 2025 09:00  Patient On (Oxygen Delivery Method): room air    PHYSICAL EXAM  General: NAD  HEENT: oral thrush +  CV: (+) S1/S2 RRR  Lungs: diminished B/L  Abdomen: soft, non-tender, non-distended (+) BS  Ext: no clubbing, cyanosis or edema  Skin: no rashes and no petechiae, +sacrococcygeal joint   Neuro: alert and oriented X 3, no focal deficits  Central Line: Left arm D/L PICC, CDI    LABS:                        7.5    7.31  )-----------( 485      ( 20 Jan 2025 06:57 )             23.9     Mean Cell Volume : 92.6 fl  Mean Cell Hemoglobin : 29.1 pg  Mean Cell Hemoglobin Concentration : 31.4 g/dL  Auto Neutrophil # : 6.58 K/uL  Auto Lymphocyte # : 0.18 K/uL  Auto Monocyte # : 0.35 K/uL  Auto Eosinophil # : 0.03 K/uL  Auto Basophil # : 0.03 K/uL  Auto Neutrophil % : 90.0 %  Auto Lymphocyte % : 2.5 %  Auto Monocyte % : 4.8 %  Auto Eosinophil % : 0.4 %  Auto Basophil % : 0.4 %      01-20    134[L]  |  101  |  32[H]  ----------------------------<  52[LL]  4.8   |  23  |  1.15    Ca    8.9      20 Jan 2025 06:57  Phos  2.0     01-20  Mg     2.0     01-20    TPro  5.7[L]  /  Alb  2.6[L]  /  TBili  0.2  /  DBili  x   /  AST  51[H]  /  ALT  62[H]  /  AlkPhos  95  01-20      RADIOLOGY & ADDITIONAL STUDIES:

## 2025-01-21 NOTE — DISCHARGE NOTE PROVIDER - NSDCFUADDAPPT_GEN_ALL_CORE_FT
APPTS ARE READY TO BE MADE: [ ] YES    Best Family or Patient Contact (if needed):    Additional Information about above appointments (if needed):    1: Oncology within 2 weeks of discharge  2: Infectious disease within 2 weeks of discharge  3: Endocrinology within 2 weeks of discharge    Other comments or requests:    APPTS ARE READY TO BE MADE: [X] YES    Best Family or Patient Contact (if needed):    Additional Information about above appointments (if needed):    1: Oncology within 2 weeks of discharge  2: Infectious disease within 2 weeks of discharge  3: Endocrinology within 2 weeks of discharge  4: Transplant nephrology within 2 weeks of discharge    Other comments or requests:

## 2025-01-21 NOTE — PHARMACOTHERAPY INTERVENTION NOTE - COMMENTS
Clinical Pharmacy Specialist- Hematology/Oncology- Progress Note    Pt is a 70 y/o male with PMH of renal transplant (2012 left nephrectomy), peripheral neuropathy, hypothyroidism, retroperitoneal fibrosis, HTN, HLD, GERD, anxiety/depression, ANGELIKA, and newly diagnosed DLBCL, s/p R mini CHOP x 1 cycle. Was due to be admitted for Cycle 2, but course complicated by infection    Antimicrobial Course (ID Following, MD Risa Ac):  - Zosyn - 1/18- 1/20  --> Ertapenem (per ID, h/o ESBL UTI 12/2024)- 1/20  MRSA nasal swab    Last Neutropenic (ANC<1000): no occurrence  Last Febrile: 1/21@05:48; T= 101.1   Days Non-Neutropenic: 5  Days afebrile: 0    Chemotherapy Course  -Current Regimen: mini- R-CHOP  History:  (12/28/24)- C1 mini-RCHOP  -Day: 25 (1/21)  BmBx:  Access:     History/Relevant clinical information used in assessment:      Assessment/Plan/Recommendation:  - discussed next line of therapy may be tafasitamab + lenalidomide- exploring auth issues  - monitor for drug procurement if approved - currently only 800mg of tafa in stick (pt needs 907mg), and REMS pathway for lenalidomide    Additional Monitoring Needed?   -Yes- Continue to monitor renal function & daily counts for abx escalation/de-escalation   -Discharge Planning:  --> New meds:  --> Meds sent for auth:  --> Delivered meds:    Case discussed with attending/primary team    Gisella Cuba PharmD, BCPS  Clinical Pharmacy Specialist | Hematology/Oncology  [facility]  Email: [email or web address] or available on [employer]

## 2025-01-21 NOTE — DISCHARGE NOTE PROVIDER - NSDCFUSCHEDAPPT_GEN_ALL_CORE_FT
Hillary Physician [facility]  Scheduled Appointment: 02/20/2025    Kervin Garcia Physician [facility]  [facility]  Scheduled Appointment: 02/20/2025

## 2025-01-21 NOTE — DISCHARGE NOTE PROVIDER - NSFOLLOWUPCLINICS_GEN_ALL_ED_FT
[facility] - Infectious Disease  Infectious Disease  [address] [facility], [place] [address]  Phone: [phone]  Fax:   Established Patient    [facility]  Hematology/Oncology  [address] [place] [address]  Phone: [phone]  Fax:   Established Patient    [facility]  Endocrinology  [address] [place] [id number]  Phone: [phone]  Fax:   Established Patient

## 2025-01-22 LAB
ALBUMIN SERPL ELPH-MCNC: 2.3 G/DL — LOW (ref 3.3–5)
ALP SERPL-CCNC: 91 U/L — SIGNIFICANT CHANGE UP (ref 40–120)
ALT FLD-CCNC: 48 U/L — HIGH (ref 10–45)
ANION GAP SERPL CALC-SCNC: 7 MMOL/L — SIGNIFICANT CHANGE UP (ref 5–17)
AST SERPL-CCNC: 43 U/L — HIGH (ref 10–40)
BASOPHILS # BLD AUTO: 0 K/UL — SIGNIFICANT CHANGE UP (ref 0–0.2)
BASOPHILS NFR BLD AUTO: 0 % — SIGNIFICANT CHANGE UP (ref 0–2)
BILIRUB SERPL-MCNC: 0.2 MG/DL — SIGNIFICANT CHANGE UP (ref 0.2–1.2)
BLD GP AB SCN SERPL QL: NEGATIVE — SIGNIFICANT CHANGE UP
BUN SERPL-MCNC: 26 MG/DL — HIGH (ref 7–23)
CALCIUM SERPL-MCNC: 8.5 MG/DL — SIGNIFICANT CHANGE UP (ref 8.4–10.5)
CHLORIDE SERPL-SCNC: 102 MMOL/L — SIGNIFICANT CHANGE UP (ref 96–108)
CO2 SERPL-SCNC: 23 MMOL/L — SIGNIFICANT CHANGE UP (ref 22–31)
CREAT SERPL-MCNC: 0.82 MG/DL — SIGNIFICANT CHANGE UP (ref 0.5–1.3)
CULTURE RESULTS: SIGNIFICANT CHANGE UP
EGFR: 96 ML/MIN/1.73M2 — SIGNIFICANT CHANGE UP
EOSINOPHIL # BLD AUTO: 0 K/UL — SIGNIFICANT CHANGE UP (ref 0–0.5)
EOSINOPHIL NFR BLD AUTO: 0 % — SIGNIFICANT CHANGE UP (ref 0–6)
GLUCOSE BLDC GLUCOMTR-MCNC: 214 MG/DL — HIGH (ref 70–99)
GLUCOSE BLDC GLUCOMTR-MCNC: 215 MG/DL — HIGH (ref 70–99)
GLUCOSE BLDC GLUCOMTR-MCNC: 230 MG/DL — HIGH (ref 70–99)
GLUCOSE BLDC GLUCOMTR-MCNC: 281 MG/DL — HIGH (ref 70–99)
GLUCOSE SERPL-MCNC: 242 MG/DL — HIGH (ref 70–99)
HCT VFR BLD CALC: 21.8 % — LOW (ref 39–50)
HGB BLD-MCNC: 6.8 G/DL — CRITICAL LOW (ref 13–17)
LDH SERPL L TO P-CCNC: 346 U/L — HIGH (ref 50–242)
LYMPHOCYTES # BLD AUTO: 0.2 K/UL — LOW (ref 1–3.3)
LYMPHOCYTES # BLD AUTO: 2.6 % — LOW (ref 13–44)
MAGNESIUM SERPL-MCNC: 1.9 MG/DL — SIGNIFICANT CHANGE UP (ref 1.6–2.6)
MCHC RBC-ENTMCNC: 28.8 PG — SIGNIFICANT CHANGE UP (ref 27–34)
MCHC RBC-ENTMCNC: 31.2 G/DL — LOW (ref 32–36)
MCV RBC AUTO: 92.4 FL — SIGNIFICANT CHANGE UP (ref 80–100)
MONOCYTES # BLD AUTO: 0.41 K/UL — SIGNIFICANT CHANGE UP (ref 0–0.9)
MONOCYTES NFR BLD AUTO: 5.3 % — SIGNIFICANT CHANGE UP (ref 2–14)
NEUTROPHILS # BLD AUTO: 6.79 K/UL — SIGNIFICANT CHANGE UP (ref 1.8–7.4)
NEUTROPHILS NFR BLD AUTO: 87.6 % — HIGH (ref 43–77)
NT-PROBNP SERPL-SCNC: 1072 PG/ML — HIGH (ref 0–300)
PHOSPHATE SERPL-MCNC: 2.4 MG/DL — LOW (ref 2.5–4.5)
PLATELET # BLD AUTO: 478 K/UL — HIGH (ref 150–400)
POTASSIUM SERPL-MCNC: 4.9 MMOL/L — SIGNIFICANT CHANGE UP (ref 3.5–5.3)
POTASSIUM SERPL-SCNC: 4.9 MMOL/L — SIGNIFICANT CHANGE UP (ref 3.5–5.3)
PROT SERPL-MCNC: 5.3 G/DL — LOW (ref 6–8.3)
RBC # BLD: 2.36 M/UL — LOW (ref 4.2–5.8)
RBC # FLD: 19.9 % — HIGH (ref 10.3–14.5)
RH IG SCN BLD-IMP: POSITIVE — SIGNIFICANT CHANGE UP
SODIUM SERPL-SCNC: 132 MMOL/L — LOW (ref 135–145)
SPECIMEN SOURCE: SIGNIFICANT CHANGE UP
URATE SERPL-MCNC: 4.7 MG/DL — SIGNIFICANT CHANGE UP (ref 3.4–8.8)
WBC # BLD: 7.67 K/UL — SIGNIFICANT CHANGE UP (ref 3.8–10.5)
WBC # FLD AUTO: 7.67 K/UL — SIGNIFICANT CHANGE UP (ref 3.8–10.5)

## 2025-01-22 PROCEDURE — 71045 X-RAY EXAM CHEST 1 VIEW: CPT | Mod: 26

## 2025-01-22 PROCEDURE — 99233 SBSQ HOSP IP/OBS HIGH 50: CPT | Mod: FS

## 2025-01-22 RX ORDER — ACETAMINOPHEN 160 MG/5ML
1000 SUSPENSION ORAL ONCE
Refills: 0 | Status: COMPLETED | OUTPATIENT
Start: 2025-01-22 | End: 2025-01-22

## 2025-01-22 RX ORDER — MAGNESIUM OXIDE 400 MG
400 TABLET ORAL ONCE
Refills: 0 | Status: COMPLETED | OUTPATIENT
Start: 2025-01-22 | End: 2025-01-22

## 2025-01-22 RX ORDER — SOD PHOSPHATE,MONOBASIC-DIBAS 3MMOL/ML
15 VIAL (ML) INTRAVENOUS ONCE
Refills: 0 | Status: COMPLETED | OUTPATIENT
Start: 2025-01-22 | End: 2025-01-22

## 2025-01-22 RX ORDER — MUPIROCIN 2 G/100G
1 CREAM TOPICAL
Refills: 0 | Status: COMPLETED | OUTPATIENT
Start: 2025-01-22 | End: 2025-01-26

## 2025-01-22 RX ORDER — OXYCODONE HYDROCHLORIDE 30 MG/1
2.5 TABLET ORAL ONCE
Refills: 0 | Status: DISCONTINUED | OUTPATIENT
Start: 2025-01-22 | End: 2025-01-22

## 2025-01-22 RX ADMIN — Medication 10 MILLIGRAM(S): at 06:11

## 2025-01-22 RX ADMIN — Medication 4: at 13:15

## 2025-01-22 RX ADMIN — MUPIROCIN 1 APPLICATION(S): 2 CREAM TOPICAL at 06:22

## 2025-01-22 RX ADMIN — POLYETHYLENE GLYCOL 3350 17 GRAM(S): 17 POWDER, FOR SOLUTION ORAL at 21:40

## 2025-01-22 RX ADMIN — ACETAMINOPHEN 1000 MILLIGRAM(S): 160 SUSPENSION ORAL at 11:13

## 2025-01-22 RX ADMIN — Medication 400 MILLIGRAM(S): at 21:40

## 2025-01-22 RX ADMIN — Medication 25 MILLIGRAM(S): at 18:20

## 2025-01-22 RX ADMIN — ANTISEPTIC SURGICAL SCRUB 1 APPLICATION(S): 0.04 SOLUTION TOPICAL at 09:50

## 2025-01-22 RX ADMIN — TACROLIMUS 1 MILLIGRAM(S): 1 CAPSULE, GELATIN COATED ORAL at 08:57

## 2025-01-22 RX ADMIN — Medication 100 MILLIGRAM(S): at 21:40

## 2025-01-22 RX ADMIN — ACETAMINOPHEN 400 MILLIGRAM(S): 160 SUSPENSION ORAL at 10:13

## 2025-01-22 RX ADMIN — ROSUVASTATIN CALCIUM 10 MILLIGRAM(S): 10 TABLET, FILM COATED ORAL at 21:40

## 2025-01-22 RX ADMIN — CLONIDINE HYDROCHLORIDE 0.1 MILLIGRAM(S): 0.2 TABLET ORAL at 21:41

## 2025-01-22 RX ADMIN — PANTOPRAZOLE 40 MILLIGRAM(S): 20 TABLET, DELAYED RELEASE ORAL at 13:14

## 2025-01-22 RX ADMIN — TACROLIMUS 1 MILLIGRAM(S): 1 CAPSULE, GELATIN COATED ORAL at 21:40

## 2025-01-22 RX ADMIN — PREDNISONE 5 MILLIGRAM(S): 5 TABLET ORAL at 06:11

## 2025-01-22 RX ADMIN — Medication 25 MILLIGRAM(S): at 10:06

## 2025-01-22 RX ADMIN — CLONIDINE HYDROCHLORIDE 0.1 MILLIGRAM(S): 0.2 TABLET ORAL at 06:11

## 2025-01-22 RX ADMIN — Medication 6: at 09:49

## 2025-01-22 RX ADMIN — BUPROPION HYDROCHLORIDE 300 MILLIGRAM(S): 150 TABLET, EXTENDED RELEASE ORAL at 13:14

## 2025-01-22 RX ADMIN — Medication 100 MILLIGRAM(S): at 13:16

## 2025-01-22 RX ADMIN — Medication 12.5 MILLIGRAM(S): at 18:20

## 2025-01-22 RX ADMIN — INSULIN GLARGINE-YFGN 38 UNIT(S): 100 INJECTION, SOLUTION SUBCUTANEOUS at 22:14

## 2025-01-22 RX ADMIN — Medication 500000 UNIT(S): at 06:11

## 2025-01-22 RX ADMIN — ENOXAPARIN SODIUM 40 MILLIGRAM(S): 100 INJECTION SUBCUTANEOUS at 21:41

## 2025-01-22 RX ADMIN — OXYCODONE HYDROCHLORIDE 2.5 MILLIGRAM(S): 30 TABLET ORAL at 22:14

## 2025-01-22 RX ADMIN — Medication 4: at 18:19

## 2025-01-22 RX ADMIN — Medication 10 MILLIGRAM(S): at 06:10

## 2025-01-22 RX ADMIN — Medication 8 UNIT(S): at 13:16

## 2025-01-22 RX ADMIN — ERTAPENEM SODIUM 120 MILLIGRAM(S): 1 INJECTION, POWDER, LYOPHILIZED, FOR SOLUTION INTRAMUSCULAR; INTRAVENOUS at 18:20

## 2025-01-22 RX ADMIN — Medication 12.5 MILLIGRAM(S): at 06:10

## 2025-01-22 RX ADMIN — LEVOTHYROXINE SODIUM 88 MICROGRAM(S): 25 TABLET ORAL at 06:11

## 2025-01-22 RX ADMIN — Medication 8 UNIT(S): at 09:50

## 2025-01-22 RX ADMIN — ESCITALOPRAM 10 MILLIGRAM(S): 10 TABLET, FILM COATED ORAL at 13:13

## 2025-01-22 RX ADMIN — Medication 100 MILLIGRAM(S): at 06:22

## 2025-01-22 RX ADMIN — POLYETHYLENE GLYCOL 3350 17 GRAM(S): 17 POWDER, FOR SOLUTION ORAL at 06:10

## 2025-01-22 RX ADMIN — Medication 10 GRAM(S): at 06:10

## 2025-01-22 RX ADMIN — Medication 8 UNIT(S): at 18:20

## 2025-01-22 RX ADMIN — Medication 2 TABLET(S): at 21:40

## 2025-01-22 RX ADMIN — CLONIDINE HYDROCHLORIDE 0.1 MILLIGRAM(S): 0.2 TABLET ORAL at 13:13

## 2025-01-22 RX ADMIN — OXYCODONE HYDROCHLORIDE 2.5 MILLIGRAM(S): 30 TABLET ORAL at 23:14

## 2025-01-22 RX ADMIN — MUPIROCIN 1 APPLICATION(S): 2 CREAM TOPICAL at 18:21

## 2025-01-22 RX ADMIN — Medication 63.75 MILLIMOLE(S): at 13:54

## 2025-01-22 NOTE — PROVIDER CONTACT NOTE (OTHER) - ASSESSMENT
Pt. a & o x 3, not exhibiting any rigors, diaphoresis or chills. pt. denies feeling SOB and did not appear to be in acute distress

## 2025-01-22 NOTE — PROGRESS NOTE ADULT - NS ATTEND AMEND GEN_ALL_CORE FT
.  Primary: [place]    Vital Signs Last 24 Hrs  T(C): 36.3 (22 Jan 2025 05:47), Max: 39.6 (21 Jan 2025 21:08)  T(F): 97.4 (22 Jan 2025 05:47), Max: 103.2 (21 Jan 2025 21:08)  HR: 65 (22 Jan 2025 05:47) (65 - 100)  BP: 148/60 (22 Jan 2025 05:47) (133/60 - 164/79)  BP(mean): --  RR: 18 (22 Jan 2025 05:47) (18 - 19)  SpO2: 95% (22 Jan 2025 05:47) (88% - 95%)    Parameters below as of 22 Jan 2025 05:47  Patient On (Oxygen Delivery Method): nasal cannula  O2 Flow (L/min): 3    MEDICATIONS  (STANDING):  alteplase for catheter clearance 2 milliGRAM(s) Catheter once  alteplase for catheter clearance 2 milliGRAM(s) Catheter once  amLODIPine   Tablet 10 milliGRAM(s) Oral daily  buPROPion XL (24-Hour) . 300 milliGRAM(s) Oral daily  carvedilol 12.5 milliGRAM(s) Oral every 12 hours  chlorhexidine 4% Liquid 1 Application(s) Topical <User Schedule>  cloNIDine 0.1 milliGRAM(s) Oral three times a day  dextrose 5%. 1000 milliLiter(s) (50 mL/Hr) IV Continuous <Continuous>  dextrose 5%. 1000 milliLiter(s) (100 mL/Hr) IV Continuous <Continuous>  dextrose 50% Injectable 25 Gram(s) IV Push once  dextrose 50% Injectable 12.5 Gram(s) IV Push once  dextrose 50% Injectable 25 Gram(s) IV Push once  enoxaparin Injectable 40 milliGRAM(s) SubCutaneous <User Schedule>  ertapenem  IVPB      ertapenem  IVPB 1000 milliGRAM(s) IV Intermittent every 24 hours  escitalopram 10 milliGRAM(s) Oral daily  glucagon  Injectable 1 milliGRAM(s) IntraMuscular once  glucagon  Injectable 1 milliGRAM(s) IntraMuscular once  hydrALAZINE 100 milliGRAM(s) Oral every 8 hours  insulin glargine Injectable (LANTUS) 38 Unit(s) SubCutaneous at bedtime  insulin lispro (ADMELOG) corrective regimen sliding scale   SubCutaneous three times a day before meals  insulin lispro (ADMELOG) corrective regimen sliding scale   SubCutaneous at bedtime  insulin lispro Injectable (ADMELOG) 8 Unit(s) SubCutaneous before breakfast  insulin lispro Injectable (ADMELOG) 8 Unit(s) SubCutaneous before lunch  insulin lispro Injectable (ADMELOG) 8 Unit(s) SubCutaneous before dinner  lactulose Syrup 15 Gram(s) Oral <User Schedule>  lactulose Syrup 10 Gram(s) Oral every 6 hours  levothyroxine 88 MICROGram(s) Oral daily  lisinopril 10 milliGRAM(s) Oral daily  mupirocin 2% Nasal 1 Application(s) Both Nostrils two times a day  nystatin    Suspension 976594 Unit(s) Oral every 6 hours  pantoprazole   Suspension 40 milliGRAM(s) Oral daily  polyethylene glycol 3350 17 Gram(s) Oral at bedtime  predniSONE   Tablet 5 milliGRAM(s) Oral two times a day  rosuvastatin 10 milliGRAM(s) Oral at bedtime  senna 2 Tablet(s) Oral at bedtime  tacrolimus 1 milliGRAM(s) Oral <User Schedule>  tacrolimus 1 milliGRAM(s) Oral at bedtime    wife (Ludmila), daughter (Luz Marina) and son (Jose J).    Assessment: Piero was a [facility] for day 1 cycle 2 mini H(O)P for PTLD but suffered from urosepsis with Klebsiella ESBL (Zosyn sensitive) and nasal corona virus, course further complicated by sustained high fevers (alyson's remains on the differential).     PMHx::  1) renal transplant 2012: left nephrectomy; renal transplant was secondary to DM, 2) AODM, 3) HLD, 4) hypothyroidism, 5) peripheral neuropathy  6) retroperitoneal fibrosis, 7) GERD, 8) HTN, 9) anxiety/depression, 10) obstructive sleep apnea, 11) osteomyelitis and E coli urosepsis (12/1/24 - 12/18/24)    Plan:  Hold planned chemotherapy. given toxic presentation and sustained high fevers unlikely to continue anthracycline-based treatment.   Consider Tafa/REV - please see social section.     ID:  ertapenem (1/20/25 - ), nystatin S&S; IgG level: preserved 950  zosyn (1/17/25 - 1/20/25)    Radiographs: Last chest/abd pelvis: 1/17/25, pelvic radiographs: pending; may also require MRI for avascular necrosis     Renal: continue /pred -- pred 5mg bid (50% dose reduction) 1/18/25  AT RISK FOR ADRENAL INSUFFIENCEY IF HYPOTENSIVE 50mg hydrocortisone IV q8     PM&R consulted: for inability to pollo bare.    Social: need to discuss treatment with Tafa/Rev.  His current rehab facility will not allow for transport to outpatient oncology to treat his lymphoma.      Over 60 minutes were spent in direct patient care and care coordination. .  Primary: [place]    Vital Signs Last 24 Hrs  T(C): 36.3 (22 Jan 2025 05:47), Max: 39.6 (21 Jan 2025 21:08)  T(F): 97.4 (22 Jan 2025 05:47), Max: 103.2 (21 Jan 2025 21:08)  HR: 65 (22 Jan 2025 05:47) (65 - 100)  BP: 148/60 (22 Jan 2025 05:47) (133/60 - 164/79)  BP(mean): --  RR: 18 (22 Jan 2025 05:47) (18 - 19)  SpO2: 95% (22 Jan 2025 05:47) (88% - 95%)    Parameters below as of 22 Jan 2025 05:47  Patient On (Oxygen Delivery Method): nasal cannula  O2 Flow (L/min): 3    MEDICATIONS  (STANDING):  alteplase for catheter clearance 2 milliGRAM(s) Catheter once  alteplase for catheter clearance 2 milliGRAM(s) Catheter once  amLODIPine   Tablet 10 milliGRAM(s) Oral daily  buPROPion XL (24-Hour) . 300 milliGRAM(s) Oral daily  carvedilol 12.5 milliGRAM(s) Oral every 12 hours  chlorhexidine 4% Liquid 1 Application(s) Topical <User Schedule>  cloNIDine 0.1 milliGRAM(s) Oral three times a day  dextrose 5%. 1000 milliLiter(s) (50 mL/Hr) IV Continuous <Continuous>  dextrose 5%. 1000 milliLiter(s) (100 mL/Hr) IV Continuous <Continuous>  dextrose 50% Injectable 25 Gram(s) IV Push once  dextrose 50% Injectable 12.5 Gram(s) IV Push once  dextrose 50% Injectable 25 Gram(s) IV Push once  enoxaparin Injectable 40 milliGRAM(s) SubCutaneous <User Schedule>  ertapenem  IVPB      ertapenem  IVPB 1000 milliGRAM(s) IV Intermittent every 24 hours  escitalopram 10 milliGRAM(s) Oral daily  glucagon  Injectable 1 milliGRAM(s) IntraMuscular once  glucagon  Injectable 1 milliGRAM(s) IntraMuscular once  hydrALAZINE 100 milliGRAM(s) Oral every 8 hours  insulin glargine Injectable (LANTUS) 38 Unit(s) SubCutaneous at bedtime  insulin lispro (ADMELOG) corrective regimen sliding scale   SubCutaneous three times a day before meals  insulin lispro (ADMELOG) corrective regimen sliding scale   SubCutaneous at bedtime  insulin lispro Injectable (ADMELOG) 8 Unit(s) SubCutaneous before breakfast  insulin lispro Injectable (ADMELOG) 8 Unit(s) SubCutaneous before lunch  insulin lispro Injectable (ADMELOG) 8 Unit(s) SubCutaneous before dinner  lactulose Syrup 15 Gram(s) Oral <User Schedule>  lactulose Syrup 10 Gram(s) Oral every 6 hours  levothyroxine 88 MICROGram(s) Oral daily  lisinopril 10 milliGRAM(s) Oral daily  mupirocin 2% Nasal 1 Application(s) Both Nostrils two times a day  nystatin    Suspension 066809 Unit(s) Oral every 6 hours  pantoprazole   Suspension 40 milliGRAM(s) Oral daily  polyethylene glycol 3350 17 Gram(s) Oral at bedtime  predniSONE   Tablet 5 milliGRAM(s) Oral two times a day  rosuvastatin 10 milliGRAM(s) Oral at bedtime  senna 2 Tablet(s) Oral at bedtime  tacrolimus 1 milliGRAM(s) Oral <User Schedule>  tacrolimus 1 milliGRAM(s) Oral at bedtime    wife (Ludmila), daughter (Luz Marina) and son (Jose J).    Assessment: Piero was a scheduled admission from [facility] in [facility] (1/17/24) for day 1 cycle 2 mini RCH(O)P for PTLD but suffered from urosepsis with Klebsiella ESBL (Zosyn sensitive) and nasal corona virus, course further complicated by sustained high fevers (alyson's remains on the differential).     Cystatin C eGFR: 40; standard eGFR calculation: 96    PMHx::  1) renal transplant 2012: left nephrectomy; renal transplant was secondary to DM, 2) AODM, 3) HLD, 4) hypothyroidism, 5) peripheral neuropathy  6) retroperitoneal fibrosis, 7) GERD, 8) HTN, 9) anxiety/depression, 10) obstructive sleep apnea, 11) osteomyelitis and E coli urosepsis (12/1/24 - 12/18/24)    Plan:  Hold planned chemotherapy. given toxic presentation and sustained high fevers unlikely to continue anthracycline-based treatment.   Consider Tafa/REV - please see social section.     ID:  ertapenem (1/20/25 - ), IgG level: preserved 950, D/C Nystatin S&S   zosyn (1/17/25 - 1/20/25)    Fevers: as clinically better save fever suspect adrenal insufficiency: hydrocortisone 25mg IV q8 x 24 hours.     Radiographs: Last chest/abd pelvis: 1/17/25, pelvic radiographs: pending; may also require MRI for avascular necrosis   Please repeat body CT scan without contrast.     Renal: continue /pred -- pred 5mg bid (50% dose reduction) 1/18/25  AT RISK FOR ADRENAL INSUFFIENCEY IF HYPOTENSIVE 50mg hydrocortisone IV q8     PM&R consulted: for inability to weight bare.    Social: need to discuss treatment with Tafa/Rev.  His current rehab facility will not allow for transport to outpatient oncology to treat his lymphoma.      Over 60 minutes were spent in direct patient care and care coordination.

## 2025-01-22 NOTE — PROGRESS NOTE ADULT - SUBJECTIVE AND OBJECTIVE BOX
[facility]  WANG Mccoy Y. Patel, S. Shah, G. Casimir  [phone]  ([phone] - weekdays after 5pm and weekends)    Name: JAN CALVIN  Age/Gender: 69y Male  MRN: [id number]    Interval History:  Patient seen and examined this morning.   Denies feeling fever or chills, Tm 103.2F overnight.  Denies chest pain, cough, dyspnea, abd pain, n/v/d, dysuria.  Notes reviewed  Allergies: No Known Allergies      Objective:  Vitals:   T(F): 97.5 (01-22-25 @ 14:05), Max: 103.2 (01-21-25 @ 21:08)  HR: 72 (01-22-25 @ 14:05) (65 - 100)  BP: 146/77 (01-22-25 @ 14:05) (133/60 - 164/79)  RR: 18 (01-22-25 @ 14:05) (18 - 19)  SpO2: 94% (01-22-25 @ 14:05) (88% - 95%)  Physical Examination:  General: no acute distress, on RA   HEENT: normocephalic, atraumatic, anicteric  Lungs: clear to auscultation anteriorly   Heart: S1, S2 present, normal rate  Abdomen: Soft, NT, distended, soft swelling?hernia  Neuro: AAOx3, no obvious focal deficits   Extremities: No cyanosis. No edema.   Skin: Warm. Dry. No visible rash.   Lines: LUE PICC with no erythema/TTP    Laboratory Studies:  CBC:                       6.8    7.67  )-----------( 478      ( 22 Jan 2025 06:52 )             21.8     WBC Trend:  7.67 01-22-25 @ 06:52  10.76 01-21-25 @ 18:48  7.31 01-20-25 @ 06:57  6.55 01-19-25 @ 06:51  7.68 01-18-25 @ 07:02  6.45 01-17-25 @ 12:55    CMP: 01-22    132[L]  |  102  |  26[H]  ----------------------------<  242[H]  4.9   |  23  |  0.82    Ca    8.5      22 Jan 2025 06:53  Phos  2.4     01-22  Mg     1.9     01-22    TPro  5.3[L]  /  Alb  2.3[L]  /  TBili  0.2  /  DBili  x   /  AST  43[H]  /  ALT  48[H]  /  AlkPhos  91  01-22    Creatinine: 0.82 mg/dL (01-22-25 @ 06:53)  Creatinine: 1.04 mg/dL (01-21-25 @ 18:48)  Creatinine: 1.15 mg/dL (01-20-25 @ 06:57)  Creatinine: 1.04 mg/dL (01-19-25 @ 06:54)  Creatinine: 0.93 mg/dL (01-18-25 @ 07:02)  Creatinine: 1.01 mg/dL (01-18-25 @ 02:41)  Creatinine: 0.81 mg/dL (01-17-25 @ 12:55)    LIVER FUNCTIONS - ( 22 Jan 2025 06:53 )  Alb: 2.3 g/dL / Pro: 5.3 g/dL / ALK PHOS: 91 U/L / ALT: 48 U/L / AST: 43 U/L / GGT: x           Microbiology: reviewed   Culture - Blood (collected 01-19-25 @ 18:36)  Source: .Blood BLOOD  Preliminary Report (01-21-25 @ 23:01):    No growth at 48 Hours    Culture - Urine (collected 01-19-25 @ 18:36)  Source: Clean Catch Clean Catch (Midstream)  Final Report (01-20-25 @ 21:26):    10,000 - 49,000 CFU/mL Candida albicans    "Susceptibilities not performed"    Culture - Urine (collected 01-17-25 @ 17:09)  Source: Clean Catch Clean Catch (Midstream)  Final Report (01-18-25 @ 20:03):    >=3 organisms. Probable collection contamination.    Culture - Blood (collected 01-17-25 @ 11:38)  Source: .Blood BLOOD  Preliminary Report (01-21-25 @ 15:01):    No growth at 4 days    SARS-CoV-2 Result: NotDetec (17 Jan 2025 17:22)    Radiology: reviewed     Medications:  acetaminophen     Tablet .. 650 milliGRAM(s) Oral every 6 hours PRN  alteplase for catheter clearance 2 milliGRAM(s) Catheter once  alteplase for catheter clearance 2 milliGRAM(s) Catheter once  amLODIPine   Tablet 10 milliGRAM(s) Oral daily  buPROPion XL (24-Hour) . 300 milliGRAM(s) Oral daily  carvedilol 12.5 milliGRAM(s) Oral every 12 hours  chlorhexidine 4% Liquid 1 Application(s) Topical <User Schedule>  cloNIDine 0.1 milliGRAM(s) Oral three times a day  dextrose 5%. 1000 milliLiter(s) IV Continuous <Continuous>  dextrose 5%. 1000 milliLiter(s) IV Continuous <Continuous>  dextrose 50% Injectable 25 Gram(s) IV Push once  dextrose 50% Injectable 12.5 Gram(s) IV Push once  dextrose 50% Injectable 25 Gram(s) IV Push once  dextrose Oral Gel 15 Gram(s) Oral once PRN  enoxaparin Injectable 40 milliGRAM(s) SubCutaneous <User Schedule>  ertapenem  IVPB      ertapenem  IVPB 1000 milliGRAM(s) IV Intermittent every 24 hours  escitalopram 10 milliGRAM(s) Oral daily  glucagon  Injectable 1 milliGRAM(s) IntraMuscular once  glucagon  Injectable 1 milliGRAM(s) IntraMuscular once  hydrALAZINE 100 milliGRAM(s) Oral every 8 hours  hydrocortisone sodium succinate Injectable 25 milliGRAM(s) IV Push every 8 hours  insulin glargine Injectable (LANTUS) 38 Unit(s) SubCutaneous at bedtime  insulin lispro (ADMELOG) corrective regimen sliding scale   SubCutaneous three times a day before meals  insulin lispro (ADMELOG) corrective regimen sliding scale   SubCutaneous at bedtime  insulin lispro Injectable (ADMELOG) 8 Unit(s) SubCutaneous before lunch  insulin lispro Injectable (ADMELOG) 8 Unit(s) SubCutaneous before dinner  insulin lispro Injectable (ADMELOG) 8 Unit(s) SubCutaneous before breakfast  lactulose Syrup 15 Gram(s) Oral <User Schedule>  lactulose Syrup 10 Gram(s) Oral every 6 hours  levothyroxine 88 MICROGram(s) Oral daily  lisinopril 10 milliGRAM(s) Oral daily  mupirocin 2% Nasal 1 Application(s) Both Nostrils two times a day  pantoprazole   Suspension 40 milliGRAM(s) Oral daily  polyethylene glycol 3350 17 Gram(s) Oral at bedtime  polyethylene glycol 3350 17 Gram(s) Oral daily PRN  rosuvastatin 10 milliGRAM(s) Oral at bedtime  senna 2 Tablet(s) Oral at bedtime  sodium chloride 0.9% lock flush 10 milliLiter(s) IV Push every 1 hour PRN  tacrolimus 1 milliGRAM(s) Oral <User Schedule>  tacrolimus 1 milliGRAM(s) Oral at bedtime    Current Antimicrobials:  ertapenem  IVPB      ertapenem  IVPB 1000 milliGRAM(s) IV Intermittent every 24 hours    Prior/Completed Antimicrobials:  ertapenem  IVPB  piperacillin/tazobactam IVPB.  piperacillin/tazobactam IVPB.-   [facility]  WANG Mccoy Y. Patel, S. Shah, G. Casimir  [phone]  ([phone] - weekdays after 5pm and weekends)    Name: JAN CALVIN  Age/Gender: 69y Male  MRN: [id number]    Interval History:  Patient seen and examined this morning.   Denies feeling fever or chills, Tm 103.2F overnight.  Denies chest pain, cough, dyspnea, abd pain, n/v/d, dysuria.  Notes reviewed  Allergies: No Known Allergies      Objective:  Vitals:   T(F): 97.5 (01-22-25 @ 14:05), Max: 103.2 (01-21-25 @ 21:08)  HR: 72 (01-22-25 @ 14:05) (65 - 100)  BP: 146/77 (01-22-25 @ 14:05) (133/60 - 164/79)  RR: 18 (01-22-25 @ 14:05) (18 - 19)  SpO2: 94% (01-22-25 @ 14:05) (88% - 95%)  Physical Examination:  General: no acute distress, on NC  HEENT: normocephalic, atraumatic, anicteric  Lungs: clear to auscultation anteriorly   Heart: S1, S2 present, normal rate  Abdomen: Soft, NT, distended, soft swelling?hernia  Neuro: AAOx3, no obvious focal deficits   Extremities: No cyanosis. No edema.   Skin: Warm. Dry. No visible rash.   Lines: LUE PICC with no erythema/TTP    Laboratory Studies:  CBC:                       6.8    7.67  )-----------( 478      ( 22 Jan 2025 06:52 )             21.8     WBC Trend:  7.67 01-22-25 @ 06:52  10.76 01-21-25 @ 18:48  7.31 01-20-25 @ 06:57  6.55 01-19-25 @ 06:51  7.68 01-18-25 @ 07:02  6.45 01-17-25 @ 12:55    CMP: 01-22    132[L]  |  102  |  26[H]  ----------------------------<  242[H]  4.9   |  23  |  0.82    Ca    8.5      22 Jan 2025 06:53  Phos  2.4     01-22  Mg     1.9     01-22    TPro  5.3[L]  /  Alb  2.3[L]  /  TBili  0.2  /  DBili  x   /  AST  43[H]  /  ALT  48[H]  /  AlkPhos  91  01-22    Creatinine: 0.82 mg/dL (01-22-25 @ 06:53)  Creatinine: 1.04 mg/dL (01-21-25 @ 18:48)  Creatinine: 1.15 mg/dL (01-20-25 @ 06:57)  Creatinine: 1.04 mg/dL (01-19-25 @ 06:54)  Creatinine: 0.93 mg/dL (01-18-25 @ 07:02)  Creatinine: 1.01 mg/dL (01-18-25 @ 02:41)  Creatinine: 0.81 mg/dL (01-17-25 @ 12:55)    LIVER FUNCTIONS - ( 22 Jan 2025 06:53 )  Alb: 2.3 g/dL / Pro: 5.3 g/dL / ALK PHOS: 91 U/L / ALT: 48 U/L / AST: 43 U/L / GGT: x           Microbiology: reviewed   Culture - Blood (collected 01-19-25 @ 18:36)  Source: .Blood BLOOD  Preliminary Report (01-21-25 @ 23:01):    No growth at 48 Hours    Culture - Urine (collected 01-19-25 @ 18:36)  Source: Clean Catch Clean Catch (Midstream)  Final Report (01-20-25 @ 21:26):    10,000 - 49,000 CFU/mL Candida albicans    "Susceptibilities not performed"    Culture - Urine (collected 01-17-25 @ 17:09)  Source: Clean Catch Clean Catch (Midstream)  Final Report (01-18-25 @ 20:03):    >=3 organisms. Probable collection contamination.    Culture - Blood (collected 01-17-25 @ 11:38)  Source: .Blood BLOOD  Preliminary Report (01-21-25 @ 15:01):    No growth at 4 days    SARS-CoV-2 Result: NotDetec (17 Jan 2025 17:22)    Radiology: reviewed     Medications:  acetaminophen     Tablet .. 650 milliGRAM(s) Oral every 6 hours PRN  alteplase for catheter clearance 2 milliGRAM(s) Catheter once  alteplase for catheter clearance 2 milliGRAM(s) Catheter once  amLODIPine   Tablet 10 milliGRAM(s) Oral daily  buPROPion XL (24-Hour) . 300 milliGRAM(s) Oral daily  carvedilol 12.5 milliGRAM(s) Oral every 12 hours  chlorhexidine 4% Liquid 1 Application(s) Topical <User Schedule>  cloNIDine 0.1 milliGRAM(s) Oral three times a day  dextrose 5%. 1000 milliLiter(s) IV Continuous <Continuous>  dextrose 5%. 1000 milliLiter(s) IV Continuous <Continuous>  dextrose 50% Injectable 25 Gram(s) IV Push once  dextrose 50% Injectable 12.5 Gram(s) IV Push once  dextrose 50% Injectable 25 Gram(s) IV Push once  dextrose Oral Gel 15 Gram(s) Oral once PRN  enoxaparin Injectable 40 milliGRAM(s) SubCutaneous <User Schedule>  ertapenem  IVPB      ertapenem  IVPB 1000 milliGRAM(s) IV Intermittent every 24 hours  escitalopram 10 milliGRAM(s) Oral daily  glucagon  Injectable 1 milliGRAM(s) IntraMuscular once  glucagon  Injectable 1 milliGRAM(s) IntraMuscular once  hydrALAZINE 100 milliGRAM(s) Oral every 8 hours  hydrocortisone sodium succinate Injectable 25 milliGRAM(s) IV Push every 8 hours  insulin glargine Injectable (LANTUS) 38 Unit(s) SubCutaneous at bedtime  insulin lispro (ADMELOG) corrective regimen sliding scale   SubCutaneous three times a day before meals  insulin lispro (ADMELOG) corrective regimen sliding scale   SubCutaneous at bedtime  insulin lispro Injectable (ADMELOG) 8 Unit(s) SubCutaneous before lunch  insulin lispro Injectable (ADMELOG) 8 Unit(s) SubCutaneous before dinner  insulin lispro Injectable (ADMELOG) 8 Unit(s) SubCutaneous before breakfast  lactulose Syrup 15 Gram(s) Oral <User Schedule>  lactulose Syrup 10 Gram(s) Oral every 6 hours  levothyroxine 88 MICROGram(s) Oral daily  lisinopril 10 milliGRAM(s) Oral daily  mupirocin 2% Nasal 1 Application(s) Both Nostrils two times a day  pantoprazole   Suspension 40 milliGRAM(s) Oral daily  polyethylene glycol 3350 17 Gram(s) Oral at bedtime  polyethylene glycol 3350 17 Gram(s) Oral daily PRN  rosuvastatin 10 milliGRAM(s) Oral at bedtime  senna 2 Tablet(s) Oral at bedtime  sodium chloride 0.9% lock flush 10 milliLiter(s) IV Push every 1 hour PRN  tacrolimus 1 milliGRAM(s) Oral <User Schedule>  tacrolimus 1 milliGRAM(s) Oral at bedtime    Current Antimicrobials:  ertapenem  IVPB      ertapenem  IVPB 1000 milliGRAM(s) IV Intermittent every 24 hours    Prior/Completed Antimicrobials:  ertapenem  IVPB  piperacillin/tazobactam IVPB.  piperacillin/tazobactam IVPB.-

## 2025-01-22 NOTE — PROGRESS NOTE ADULT - PROBLEM SELECTOR PLAN 1
Admitted  for R mini CHOP, found to be febrile on admission hold  chemo today.  Monitor CBC with diff, transfuse as needed.  Monitor electrolytes, replete as needed.  Daily weights.  Strict I/O.  1/21 recurrent fever. repeat cultures. PM&R evaluation. recomm repeat imaging to assess integrity of prior hip replacement and/or r/o pathological fracture. Admitted  for R mini CHOP, found to be febrile on admission hold  chemo   Monitor CBC with diff, transfuse as needed.  Monitor electrolytes, replete as needed.  Daily weights.  Strict I/O. Admitted  for R mini CHOP, found to be febrile on admission hold  chemo   Monitor CBC with diff, transfuse as needed.  Monitor electrolytes, replete as needed.  Daily weights.  Strict I/O. mouth care Admitted  for R mini CHOP, found to be febrile on admission hold  chemo   Monitor CBC with diff, transfuse as needed.  Monitor electrolytes, replete as needed.  Daily weights.  Strict I/O. mouth care  1/22 Hypophosphatemia sodium phosphate 15 Mmole IV x 1  1/22 Hypomagnesemia Magnesium oxide 500 mg PO x 1

## 2025-01-22 NOTE — PHARMACOTHERAPY INTERVENTION NOTE - COMMENTS
Clinical Pharmacy Specialist- Hematology/Oncology- Progress Note    Pt is a 70 y/o male with PMH of renal transplant (2012 left nephrectomy), peripheral neuropathy, hypothyroidism, retroperitoneal fibrosis, HTN, HLD, GERD, anxiety/depression, ANGELIKA, and newly diagnosed DLBCL, s/p R mini CHOP x 1 cycle. Was due to be admitted for Cycle 2, but course complicated by infection    Antimicrobial Course (ID Following, MD Risa Ac):  - Zosyn - 1/18- 1/20  --> Ertapenem (per ID, h/o ESBL UTI 12/2024)- 1/20  MRSA nasal swab    Last Neutropenic (ANC<1000): no occurrence  Last Febrile: 1/22@09:35 T= 101.4; (Tmax= 103.2; 1/21@21:08)   Days Non-Neutropenic: 5  Days afebrile: 0    Chemotherapy Course  -Current Regimen: mini- R-CHOP  History:  (12/28/24)- C1 mini-RCHOP  -Day: 26 (1/22)  BmBx:  Access:     History/Relevant clinical information used in assessment:      Assessment/Plan/Recommendation:  - discussed next line of therapy may be tafasitamab + lenalidomide- exploring auth issues  - monitor for drug procurement if approved - currently only 800mg of tafa in stick (pt needs 907mg), and REMS pathway for lenalidomide  ---------------------------------------------------------  CrCl vs Cystatin-C EGFR (1/17/25)  - CrCl= 95ml/min (scr= 0.81  - Cystatin-C EGFR= 40ml/min (Cystatin-C= 1.63)  - 58% dose difference    Additional Monitoring Needed?   -Yes- Continue to monitor renal function & daily counts for abx escalation/de-escalation   -Discharge Planning:  --> New meds:  --> Meds sent for auth:  --> Delivered meds:    Case discussed with attending/primary team    Gisella Cuba, PharmD, BCPS  Clinical Pharmacy Specialist | Hematology/Oncology  [facility]  Email: [email or web address] or available on [email or web address]

## 2025-01-22 NOTE — PROGRESS NOTE ADULT - SUBJECTIVE AND OBJECTIVE BOX
Diagnosis: PTLD    Protocol/Chemo Regimen: (On hold)    Pt endorsed:  +fatigue  +sacral pain, discomfort    Review of Systems:   Denies any nausea, vomiting chest pain, palpitation, SOB, abdominal pain.     Pain scale: sacral area pain, discomfort    Diet: Consistent carb    Allergies: No Known Allergies    ANTIMICROBIALS  ertapenem  IVPB 1000 milliGRAM(s) IV Intermittent every 24 hours  nystatin    Suspension 503620 Unit(s) Oral every 6 hours    HEME/ONC MEDICATIONS  alteplase for catheter clearance 2 milliGRAM(s) Catheter once  alteplase for catheter clearance 2 milliGRAM(s) Catheter once  enoxaparin Injectable 40 milliGRAM(s) SubCutaneous <User Schedule>    STANDING MEDICATIONS  amLODIPine   Tablet 10 milliGRAM(s) Oral daily  buPROPion XL (24-Hour) . 300 milliGRAM(s) Oral daily  carvedilol 12.5 milliGRAM(s) Oral every 12 hours  chlorhexidine 4% Liquid 1 Application(s) Topical <User Schedule>  cloNIDine 0.1 milliGRAM(s) Oral three times a day  dextrose 5%. 1000 milliLiter(s) IV Continuous <Continuous>  dextrose 5%. 1000 milliLiter(s) IV Continuous <Continuous>  dextrose 50% Injectable 25 Gram(s) IV Push once  dextrose 50% Injectable 12.5 Gram(s) IV Push once  dextrose 50% Injectable 25 Gram(s) IV Push once  escitalopram 10 milliGRAM(s) Oral daily  glucagon  Injectable 1 milliGRAM(s) IntraMuscular once  glucagon  Injectable 1 milliGRAM(s) IntraMuscular once  hydrALAZINE 100 milliGRAM(s) Oral every 8 hours  insulin glargine Injectable (LANTUS) 38 Unit(s) SubCutaneous at bedtime  insulin lispro (ADMELOG) corrective regimen sliding scale   SubCutaneous three times a day before meals  insulin lispro (ADMELOG) corrective regimen sliding scale   SubCutaneous at bedtime  insulin lispro Injectable (ADMELOG) 8 Unit(s) SubCutaneous before breakfast  insulin lispro Injectable (ADMELOG) 8 Unit(s) SubCutaneous before lunch  insulin lispro Injectable (ADMELOG) 8 Unit(s) SubCutaneous before dinner  lactulose Syrup 15 Gram(s) Oral <User Schedule>  lactulose Syrup 10 Gram(s) Oral every 6 hours  levothyroxine 88 MICROGram(s) Oral daily  lisinopril 10 milliGRAM(s) Oral daily  pantoprazole   Suspension 40 milliGRAM(s) Oral daily  polyethylene glycol 3350 17 Gram(s) Oral at bedtime  predniSONE   Tablet 5 milliGRAM(s) Oral two times a day  rosuvastatin 10 milliGRAM(s) Oral at bedtime  senna 2 Tablet(s) Oral at bedtime  tacrolimus 1 milliGRAM(s) Oral <User Schedule>  tacrolimus 1 milliGRAM(s) Oral at bedtime    PRN MEDICATIONS  acetaminophen     Tablet .. 650 milliGRAM(s) Oral every 6 hours PRN  dextrose Oral Gel 15 Gram(s) Oral once PRN  polyethylene glycol 3350 17 Gram(s) Oral daily PRN  sodium chloride 0.9% lock flush 10 milliLiter(s) IV Push every 1 hour PRN    Vital Signs Last 24 Hrs  T(C): 36.3 (22 Jan 2025 05:47), Max: 39.6 (21 Jan 2025 21:08)  T(F): 97.4 (22 Jan 2025 05:47), Max: 103.2 (21 Jan 2025 21:08)  HR: 65 (22 Jan 2025 05:47) (65 - 100)  BP: 148/60 (22 Jan 2025 05:47) (133/60 - 164/79)  BP(mean): --  RR: 18 (22 Jan 2025 05:47) (18 - 19)  SpO2: 95% (22 Jan 2025 05:47) (88% - 95%)    Parameters below as of 22 Jan 2025 05:47  Patient On (Oxygen Delivery Method): nasal cannula  O2 Flow (L/min): 3      PHYSICAL EXAM  General: NAD  HEENT: oral thrush +  CV: (+) S1/S2 RRR  Lungs: diminished B/L  Abdomen: soft, non-tender, non-distended (+) BS  Ext: no clubbing, cyanosis or edema  Skin: no rashes and no petechiae, +sacrococcygeal joint   Neuro: alert and oriented X 3   Central Line: Left arm D/L PICC, CDI      LABS:                          6.8    7.67  )-----------( 478      ( 22 Jan 2025 06:52 )             21.8         Mean Cell Volume : 92.4 fl  Mean Cell Hemoglobin : 28.8 pg  Mean Cell Hemoglobin Concentration : 31.2 g/dL  Auto Neutrophil # : 6.79 K/uL  Auto Lymphocyte # : 0.20 K/uL  Auto Monocyte # : 0.41 K/uL  Auto Eosinophil # : 0.00 K/uL  Auto Basophil # : 0.00 K/uL  Auto Neutrophil % : 87.6 %  Auto Lymphocyte % : 2.6 %  Auto Monocyte % : 5.3 %  Auto Eosinophil % : 0.0 %  Auto Basophil % : 0.0 %      01-22    132[L]  |  102  |  26[H]  ----------------------------<  242[H]  4.9   |  23  |  0.82    Ca    8.5      22 Jan 2025 06:53  Phos  2.4     01-22  Mg     1.9     01-22    TPro  5.3[L]  /  Alb  2.3[L]  /  TBili  0.2  /  DBili  x   /  AST  43[H]  /  ALT  48[H]  /  AlkPhos  91  01-22        PT/INR - ( 21 Jan 2025 18:48 )   PT: 12.6 sec;   INR: 1.10 ratio         PTT - ( 21 Jan 2025 18:48 )  PTT:35.8 sec      Uric Acid 4.7                  RADIOLOGY & ADDITIONAL STUDIES:

## 2025-01-22 NOTE — PROGRESS NOTE ADULT - PROBLEM SELECTOR PLAN 2
Not neutropenic.  1/17 - F/u BCX  1/17 - F/U CT C/A/P- Extensive new bilateral ground glass nodular opacities, upper lobe predominant. The etiology is unclear, question pneumonia. Small right and trace left pleural effusion. Right hepatic mass is not well evaluated but appears decreased in size  since 12/9/2024. Known splenic mass is poorly seen without contrast.  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  1/17- F/u Flu/COVID PCR , + for Coronavirus   (last hospitalization: osteomyelitis and E coli urosepsis (12/1/24 - 12/18/24)    (12/18/24): for osteomyelitis & E coli urosepsis).  D/[facility] on 1/20  Zosyn O/N  1/18 - Oral candidiasis - Nystatin S/S  1/19- ID consult, followed by [facility], Zosyn changed to Ertapenem  history of ESBL Kleb in Ucx 12/31/24, ESBL E.coli 11/29/24 Ucx Not neutropenic.  1/17 - F/U CT C/A/P- Extensive new bilateral ground glass nodular opacities, upper lobe predominant. The etiology is unclear, question pneumonia. Small right and trace left pleural effusion. Right hepatic mass is not well evaluated but appears decreased in size  since 12/9/2024. Known splenic mass is poorly seen without contrast.  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  1/17- F/u Flu/COVID PCR , + for Coronavirus   (last hospitalization: osteomyelitis and E coli urosepsis (12/1/24 - 12/18/24)   [facility] (12/18/24): for osteomyelitis & E coli urosepsis).  D/[facility] on 1/20  Zosyn O/N  1/18 - Oral candidiasis - Nystatin S/S  1/19- ID consult, followed by [facility], Zosyn changed to Ertapenem  history of ESBL Kleb in Ucx 12/31/24, ESBL E.coli 11/29/24 Ucx  1/21. PM&R evaluation. recomm repeat imaging to assess integrity of prior hip replacement and/or r/o pathological fracture.  1/22 Follow up Pan scan Not neutropenic. febrile   1/17 - F/U CT C/A/P- Extensive new bilateral ground glass nodular opacities, upper lobe predominant. The etiology is unclear, question pneumonia. Small right and trace left pleural effusion. Right hepatic mass is not well evaluated but appears decreased in size  since 12/9/2024. Known splenic mass is poorly seen without contrast.  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  1/17- F/u Flu/COVID PCR , + for Coronavirus   (last hospitalization: osteomyelitis and E coli urosepsis (12/1/24 - 12/18/24)   [facility] (12/18/24): for osteomyelitis & E coli urosepsis).  D/[facility] on 1/20  Zosyn O/N  1/18 - Oral candidiasis - Nystatin S/S  1/19- ID consult, followed by [facility], Zosyn changed to Ertapenem  history of ESBL Kleb in Ucx 12/31/24, ESBL E.coli 11/29/24 Ucx  1/21. PM&R evaluation. recomm repeat imaging to assess integrity of prior hip replacement and/or r/o pathological fracture.  1/22 Follow up Pan scan

## 2025-01-22 NOTE — PROGRESS NOTE ADULT - ASSESSMENT
M 68 y/o M PMHx renal transplant 2012 (2/2 DM, s/p left nephrectomy), peripheral neuropathy, hypothyroidism, retroperitoneal fibrosis, HTN, HLD, GERD, anxiety/depression, ANGELIKA and recent admission at [facility] for sacral osteomyelitis and ESBL.coli urosepsis discharged on 12/18/24 to rehab. While admitted to [facility] was noted to have hypercalcemia and liver masses which were biopsied on 12/16/24, biopsy revealed DLBCL. Patient received R mini CHOP on 12/28 now admitted for cycle #2 Rmini CHOP, upon admission patient is febrile will hold chemotherapy today.    M 70 y/o M PMHx renal transplant 2012 (2/2 DM, s/p left nephrectomy), peripheral neuropathy, hypothyroidism, retroperitoneal fibrosis, HTN, HLD, GERD, anxiety/depression, ANGELIKA and recent admission at [facility] for sacral osteomyelitis and ESBL.coli urosepsis discharged on 12/18/24 to rehab. While admitted to [facility] was noted to have hypercalcemia and liver masses which were biopsied on 12/16/24, biopsy revealed DLBCL. Patient received R mini CHOP on 12/28 now admitted for cycle #2 Rmini CHOP, upon admission patient is febrile will hold chemotherapy today.   Pt has been pancytopenic secondary to chemotherapy and or disease  M 68 y/o M PMHx renal transplant 2012 (2/2 DM, s/p left nephrectomy), peripheral neuropathy, hypothyroidism, retroperitoneal fibrosis, HTN, HLD, GERD, anxiety/depression, ANGELIKA and recent admission at [facility] for sacral osteomyelitis and ESBL.coli urosepsis discharged on 12/18/24 to rehab. While admitted to [facility] was noted to have hypercalcemia and liver masses which were biopsied on 12/16/24, biopsy revealed DLBCL. Patient received R mini CHOP on 12/28 now admitted for cycle #2 Rmini CHOP, upon admission patient is febrile and Chemotherapy is on HOLD   Pt has been pancytopenic secondary to chemotherapy and or disease

## 2025-01-22 NOTE — PROGRESS NOTE ADULT - PROBLEM SELECTOR PLAN 5
Continue Tacrolimus 1mg in am and 1 mg at HS.  Pred 5mg bid (50% dose reduction) 1/18/25  AT RISK FOR ADRENAL INSUFFIENCEY IF HYPOTENSIVE 50mg hydrocortisone IV q8 Continue Tacrolimus 1mg in am and 1 mg at HS.  Pred 5mg bid (50% dose reduction) 1/18/25  AT RISK FOR ADRENAL INSUFFIENCEY IF HYPOTENSIVE 50mg hydrocortisone IV q8  1/22 Started Hydrocortisone 25 mg IV every 8 hrs started Continue Tacrolimus 1mg in am and 1 mg at HS.  Pred 5mg bid (50% dose reduction) 1/18/25 ON HOLD ON 1/22   AT RISK FOR ADRENAL INSUFFIENCEY IF HYPOTENSIVE 50mg hydrocortisone IV q8  1/22 Started Hydrocortisone 25 mg IV every 8 hrs started x 3 doses  will reassess in am

## 2025-01-23 LAB
ALBUMIN SERPL ELPH-MCNC: 2.3 G/DL — LOW (ref 3.3–5)
ALP SERPL-CCNC: 95 U/L — SIGNIFICANT CHANGE UP (ref 40–120)
ALT FLD-CCNC: 44 U/L — SIGNIFICANT CHANGE UP (ref 10–45)
ANION GAP SERPL CALC-SCNC: 10 MMOL/L — SIGNIFICANT CHANGE UP (ref 5–17)
AST SERPL-CCNC: 48 U/L — HIGH (ref 10–40)
BASOPHILS # BLD AUTO: 0.03 K/UL — SIGNIFICANT CHANGE UP (ref 0–0.2)
BASOPHILS NFR BLD AUTO: 0.4 % — SIGNIFICANT CHANGE UP (ref 0–2)
BILIRUB SERPL-MCNC: 0.2 MG/DL — SIGNIFICANT CHANGE UP (ref 0.2–1.2)
BUN SERPL-MCNC: 24 MG/DL — HIGH (ref 7–23)
CALCIUM SERPL-MCNC: 9 MG/DL — SIGNIFICANT CHANGE UP (ref 8.4–10.5)
CHLORIDE SERPL-SCNC: 102 MMOL/L — SIGNIFICANT CHANGE UP (ref 96–108)
CO2 SERPL-SCNC: 22 MMOL/L — SIGNIFICANT CHANGE UP (ref 22–31)
CREAT SERPL-MCNC: 0.84 MG/DL — SIGNIFICANT CHANGE UP (ref 0.5–1.3)
EGFR: 96 ML/MIN/1.73M2 — SIGNIFICANT CHANGE UP
EOSINOPHIL # BLD AUTO: 0 K/UL — SIGNIFICANT CHANGE UP (ref 0–0.5)
EOSINOPHIL NFR BLD AUTO: 0 % — SIGNIFICANT CHANGE UP (ref 0–6)
GLUCOSE BLDC GLUCOMTR-MCNC: 134 MG/DL — HIGH (ref 70–99)
GLUCOSE BLDC GLUCOMTR-MCNC: 139 MG/DL — HIGH (ref 70–99)
GLUCOSE BLDC GLUCOMTR-MCNC: 142 MG/DL — HIGH (ref 70–99)
GLUCOSE BLDC GLUCOMTR-MCNC: 222 MG/DL — HIGH (ref 70–99)
GLUCOSE SERPL-MCNC: 195 MG/DL — HIGH (ref 70–99)
HCT VFR BLD CALC: 25.4 % — LOW (ref 39–50)
HGB BLD-MCNC: 8.1 G/DL — LOW (ref 13–17)
IMM GRANULOCYTES NFR BLD AUTO: 4.7 % — HIGH (ref 0–0.9)
LDH SERPL L TO P-CCNC: 478 U/L — HIGH (ref 50–242)
LYMPHOCYTES # BLD AUTO: 0.2 K/UL — LOW (ref 1–3.3)
LYMPHOCYTES # BLD AUTO: 2.5 % — LOW (ref 13–44)
MAGNESIUM SERPL-MCNC: 2 MG/DL — SIGNIFICANT CHANGE UP (ref 1.6–2.6)
MCHC RBC-ENTMCNC: 28.6 PG — SIGNIFICANT CHANGE UP (ref 27–34)
MCHC RBC-ENTMCNC: 31.9 G/DL — LOW (ref 32–36)
MCV RBC AUTO: 89.8 FL — SIGNIFICANT CHANGE UP (ref 80–100)
MONOCYTES # BLD AUTO: 0.29 K/UL — SIGNIFICANT CHANGE UP (ref 0–0.9)
MONOCYTES NFR BLD AUTO: 3.7 % — SIGNIFICANT CHANGE UP (ref 2–14)
NEUTROPHILS # BLD AUTO: 7.04 K/UL — SIGNIFICANT CHANGE UP (ref 1.8–7.4)
NEUTROPHILS NFR BLD AUTO: 88.7 % — HIGH (ref 43–77)
NRBC # BLD: 0 /100 WBCS — SIGNIFICANT CHANGE UP (ref 0–0)
NRBC BLD-RTO: 0 /100 WBCS — SIGNIFICANT CHANGE UP (ref 0–0)
NT-PROBNP SERPL-SCNC: 1075 PG/ML — HIGH (ref 0–300)
PHOSPHATE SERPL-MCNC: 2.4 MG/DL — LOW (ref 2.5–4.5)
PLATELET # BLD AUTO: 563 K/UL — HIGH (ref 150–400)
POTASSIUM SERPL-MCNC: 4.6 MMOL/L — SIGNIFICANT CHANGE UP (ref 3.5–5.3)
POTASSIUM SERPL-SCNC: 4.6 MMOL/L — SIGNIFICANT CHANGE UP (ref 3.5–5.3)
PROT SERPL-MCNC: 5.7 G/DL — LOW (ref 6–8.3)
RBC # BLD: 2.83 M/UL — LOW (ref 4.2–5.8)
RBC # FLD: 19.4 % — HIGH (ref 10.3–14.5)
SODIUM SERPL-SCNC: 134 MMOL/L — LOW (ref 135–145)
URATE SERPL-MCNC: 5.5 MG/DL — SIGNIFICANT CHANGE UP (ref 3.4–8.8)
WBC # BLD: 7.93 K/UL — SIGNIFICANT CHANGE UP (ref 3.8–10.5)
WBC # FLD AUTO: 7.93 K/UL — SIGNIFICANT CHANGE UP (ref 3.8–10.5)

## 2025-01-23 PROCEDURE — 99233 SBSQ HOSP IP/OBS HIGH 50: CPT

## 2025-01-23 PROCEDURE — 74176 CT ABD & PELVIS W/O CONTRAST: CPT | Mod: 26

## 2025-01-23 PROCEDURE — 73522 X-RAY EXAM HIPS BI 3-4 VIEWS: CPT | Mod: 26

## 2025-01-23 PROCEDURE — 71250 CT THORAX DX C-: CPT | Mod: 26

## 2025-01-23 RX ORDER — BUMETANIDE 2 MG/1
1 TABLET ORAL ONCE
Refills: 0 | Status: COMPLETED | OUTPATIENT
Start: 2025-01-23 | End: 2025-01-23

## 2025-01-23 RX ORDER — ACETAMINOPHEN 160 MG/5ML
1000 SUSPENSION ORAL ONCE
Refills: 0 | Status: COMPLETED | OUTPATIENT
Start: 2025-01-23 | End: 2025-01-23

## 2025-01-23 RX ADMIN — ESCITALOPRAM 10 MILLIGRAM(S): 10 TABLET, FILM COATED ORAL at 13:55

## 2025-01-23 RX ADMIN — TACROLIMUS 1 MILLIGRAM(S): 1 CAPSULE, GELATIN COATED ORAL at 21:46

## 2025-01-23 RX ADMIN — Medication 12.5 MILLIGRAM(S): at 18:55

## 2025-01-23 RX ADMIN — TACROLIMUS 1 MILLIGRAM(S): 1 CAPSULE, GELATIN COATED ORAL at 08:57

## 2025-01-23 RX ADMIN — PANTOPRAZOLE 40 MILLIGRAM(S): 20 TABLET, DELAYED RELEASE ORAL at 13:57

## 2025-01-23 RX ADMIN — Medication 100 MILLIGRAM(S): at 21:46

## 2025-01-23 RX ADMIN — ACETAMINOPHEN 400 MILLIGRAM(S): 160 SUSPENSION ORAL at 12:28

## 2025-01-23 RX ADMIN — ACETAMINOPHEN 400 MILLIGRAM(S): 160 SUSPENSION ORAL at 17:58

## 2025-01-23 RX ADMIN — Medication 12.5 MILLIGRAM(S): at 05:15

## 2025-01-23 RX ADMIN — CLONIDINE HYDROCHLORIDE 0.1 MILLIGRAM(S): 0.2 TABLET ORAL at 05:15

## 2025-01-23 RX ADMIN — MUPIROCIN 1 APPLICATION(S): 2 CREAM TOPICAL at 05:16

## 2025-01-23 RX ADMIN — ACETAMINOPHEN 400 MILLIGRAM(S): 160 SUSPENSION ORAL at 23:35

## 2025-01-23 RX ADMIN — ROSUVASTATIN CALCIUM 10 MILLIGRAM(S): 10 TABLET, FILM COATED ORAL at 21:46

## 2025-01-23 RX ADMIN — Medication 2 TABLET(S): at 21:46

## 2025-01-23 RX ADMIN — Medication 8 UNIT(S): at 18:56

## 2025-01-23 RX ADMIN — ERTAPENEM SODIUM 120 MILLIGRAM(S): 1 INJECTION, POWDER, LYOPHILIZED, FOR SOLUTION INTRAMUSCULAR; INTRAVENOUS at 18:57

## 2025-01-23 RX ADMIN — CLONIDINE HYDROCHLORIDE 0.1 MILLIGRAM(S): 0.2 TABLET ORAL at 21:46

## 2025-01-23 RX ADMIN — Medication 25 MILLIGRAM(S): at 01:08

## 2025-01-23 RX ADMIN — MUPIROCIN 1 APPLICATION(S): 2 CREAM TOPICAL at 17:58

## 2025-01-23 RX ADMIN — ACETAMINOPHEN 1000 MILLIGRAM(S): 160 SUSPENSION ORAL at 13:45

## 2025-01-23 RX ADMIN — ACETAMINOPHEN 1000 MILLIGRAM(S): 160 SUSPENSION ORAL at 19:45

## 2025-01-23 RX ADMIN — CLONIDINE HYDROCHLORIDE 0.1 MILLIGRAM(S): 0.2 TABLET ORAL at 13:56

## 2025-01-23 RX ADMIN — Medication 100 MILLIGRAM(S): at 05:16

## 2025-01-23 RX ADMIN — Medication 10 MILLIGRAM(S): at 05:16

## 2025-01-23 RX ADMIN — INSULIN GLARGINE-YFGN 38 UNIT(S): 100 INJECTION, SOLUTION SUBCUTANEOUS at 21:54

## 2025-01-23 RX ADMIN — ENOXAPARIN SODIUM 40 MILLIGRAM(S): 100 INJECTION SUBCUTANEOUS at 21:47

## 2025-01-23 RX ADMIN — Medication 8 UNIT(S): at 13:56

## 2025-01-23 RX ADMIN — BUMETANIDE 1 MILLIGRAM(S): 2 TABLET ORAL at 13:56

## 2025-01-23 RX ADMIN — ANTISEPTIC SURGICAL SCRUB 1 APPLICATION(S): 0.04 SOLUTION TOPICAL at 08:58

## 2025-01-23 RX ADMIN — LEVOTHYROXINE SODIUM 88 MICROGRAM(S): 25 TABLET ORAL at 05:16

## 2025-01-23 RX ADMIN — Medication 10 MILLIGRAM(S): at 05:15

## 2025-01-23 RX ADMIN — BUPROPION HYDROCHLORIDE 300 MILLIGRAM(S): 150 TABLET, EXTENDED RELEASE ORAL at 13:56

## 2025-01-23 RX ADMIN — Medication 8 UNIT(S): at 08:58

## 2025-01-23 RX ADMIN — Medication 100 MILLIGRAM(S): at 13:55

## 2025-01-23 RX ADMIN — Medication 4: at 08:57

## 2025-01-23 NOTE — PROGRESS NOTE ADULT - NS ATTEND AMEND GEN_ALL_CORE FT
.  Primary: [place]    Vital Signs Last 24 Hrs  T(C): 37 (23 Jan 2025 00:49), Max: 38.6 (22 Jan 2025 09:35)  T(F): 98.6 (23 Jan 2025 00:49), Max: 101.4 (22 Jan 2025 09:35)  HR: 79 (23 Jan 2025 00:49) (65 - 85)  BP: 122/66 (23 Jan 2025 00:49) (122/66 - 163/66)  BP(mean): --  RR: 17 (23 Jan 2025 00:49) (17 - 18)  SpO2: 91% (23 Jan 2025 00:49) (91% - 95%)    Parameters below as of 23 Jan 2025 00:49  Patient On (Oxygen Delivery Method): nasal cannula w/ humidification  O2 Flow (L/min): 5    MEDICATIONS  (STANDING):  alteplase for catheter clearance 2 milliGRAM(s) Catheter once  alteplase for catheter clearance 2 milliGRAM(s) Catheter once  amLODIPine   Tablet 10 milliGRAM(s) Oral daily  buPROPion XL (24-Hour) . 300 milliGRAM(s) Oral daily  carvedilol 12.5 milliGRAM(s) Oral every 12 hours  chlorhexidine 4% Liquid 1 Application(s) Topical <User Schedule>  cloNIDine 0.1 milliGRAM(s) Oral three times a day  dextrose 5%. 1000 milliLiter(s) (100 mL/Hr) IV Continuous <Continuous>  dextrose 5%. 1000 milliLiter(s) (50 mL/Hr) IV Continuous <Continuous>  dextrose 50% Injectable 25 Gram(s) IV Push once  dextrose 50% Injectable 12.5 Gram(s) IV Push once  dextrose 50% Injectable 25 Gram(s) IV Push once  enoxaparin Injectable 40 milliGRAM(s) SubCutaneous <User Schedule>  ertapenem  IVPB      ertapenem  IVPB 1000 milliGRAM(s) IV Intermittent every 24 hours  escitalopram 10 milliGRAM(s) Oral daily  glucagon  Injectable 1 milliGRAM(s) IntraMuscular once  glucagon  Injectable 1 milliGRAM(s) IntraMuscular once  hydrALAZINE 100 milliGRAM(s) Oral every 8 hours  insulin glargine Injectable (LANTUS) 38 Unit(s) SubCutaneous at bedtime  insulin lispro (ADMELOG) corrective regimen sliding scale   SubCutaneous three times a day before meals  insulin lispro (ADMELOG) corrective regimen sliding scale   SubCutaneous at bedtime  insulin lispro Injectable (ADMELOG) 8 Unit(s) SubCutaneous before breakfast  insulin lispro Injectable (ADMELOG) 8 Unit(s) SubCutaneous before lunch  insulin lispro Injectable (ADMELOG) 8 Unit(s) SubCutaneous before dinner  lactulose Syrup 15 Gram(s) Oral <User Schedule>  lactulose Syrup 10 Gram(s) Oral every 6 hours  levothyroxine 88 MICROGram(s) Oral daily  lisinopril 10 milliGRAM(s) Oral daily  mupirocin 2% Nasal 1 Application(s) Both Nostrils two times a day  pantoprazole   Suspension 40 milliGRAM(s) Oral daily  polyethylene glycol 3350 17 Gram(s) Oral at bedtime  rosuvastatin 10 milliGRAM(s) Oral at bedtime  senna 2 Tablet(s) Oral at bedtime  tacrolimus 1 milliGRAM(s) Oral <User Schedule>  tacrolimus 1 milliGRAM(s) Oral at bedtime      wife (Ludmila), daughter (Luz Marina) and son (Jose J).    Assessment: Piero was a [facility] for day 1 cycle 2 mini RCH(O)P for PTLD but suffered from urosepsis with Klebsiella ESBL (Zosyn sensitive) and nasal corona virus, course further complicated by sustained high fevers (alyson's remains on the differential).   Day 1 tafa/rev    Cystatin C eGFR: 40; standard eGFR calculation: 96    PMHx::  1) renal transplant 2012: left nephrectomy; renal transplant was secondary to DM, 2) AODM, 3) HLD, 4) hypothyroidism, 5) peripheral neuropathy  6) retroperitoneal fibrosis, 7) GERD, 8) HTN, 9) anxiety/depression, 10) obstructive sleep apnea, 11) osteomyelitis and E coli urosepsis (12/1/24 - 12/18/24)    Plan:  Hold planned chemotherapy. given toxic presentation and sustained high fevers unlikely to continue anthracycline-based treatment.   Start Tafa/REV -      ID:  ertapenem (1/20/25 - ),   zosyn (1/17/25 - 1/20/25)    Fevers: as clinically better save fever suspect adrenal insufficiency: hydrocortisone 25mg IV q8 x 24 hours.     Radiographs: Last chest/abd pelvis: 1/17/25, pelvic radiographs: pending; may also require MRI for avascular necrosis   Please repeat body CT scan without contrast.     Renal: continue /pred -- pred 5mg bid (50% dose reduction) 1/18/25  AT RISK FOR ADRENAL INSUFFIENCEY IF HYPOTENSIVE 50mg hydrocortisone IV q8     PM&R consulted: for inability to weight bare.    Social: need to discuss treatment with Tafa/Rev.  His current rehab facility will not allow for transport to outpatient oncology to treat his lymphoma.      Over 60 minutes were spent in direct patient care and care coordination. .  Primary: [place]    Vital Signs Last 24 Hrs  T(C): 37 (23 Jan 2025 00:49), Max: 38.6 (22 Jan 2025 09:35)  T(F): 98.6 (23 Jan 2025 00:49), Max: 101.4 (22 Jan 2025 09:35)  HR: 79 (23 Jan 2025 00:49) (65 - 85)  BP: 122/66 (23 Jan 2025 00:49) (122/66 - 163/66)  BP(mean): --  RR: 17 (23 Jan 2025 00:49) (17 - 18)  SpO2: 91% (23 Jan 2025 00:49) (91% - 95%)    Parameters below as of 23 Jan 2025 00:49  Patient On (Oxygen Delivery Method): nasal cannula w/ humidification  O2 Flow (L/min): 5    MEDICATIONS  (STANDING):  alteplase for catheter clearance 2 milliGRAM(s) Catheter once  alteplase for catheter clearance 2 milliGRAM(s) Catheter once  amLODIPine   Tablet 10 milliGRAM(s) Oral daily  buPROPion XL (24-Hour) . 300 milliGRAM(s) Oral daily  carvedilol 12.5 milliGRAM(s) Oral every 12 hours  chlorhexidine 4% Liquid 1 Application(s) Topical <User Schedule>  cloNIDine 0.1 milliGRAM(s) Oral three times a day  dextrose 5%. 1000 milliLiter(s) (100 mL/Hr) IV Continuous <Continuous>  dextrose 5%. 1000 milliLiter(s) (50 mL/Hr) IV Continuous <Continuous>  dextrose 50% Injectable 25 Gram(s) IV Push once  dextrose 50% Injectable 12.5 Gram(s) IV Push once  dextrose 50% Injectable 25 Gram(s) IV Push once  enoxaparin Injectable 40 milliGRAM(s) SubCutaneous <User Schedule>  ertapenem  IVPB      ertapenem  IVPB 1000 milliGRAM(s) IV Intermittent every 24 hours  escitalopram 10 milliGRAM(s) Oral daily  glucagon  Injectable 1 milliGRAM(s) IntraMuscular once  glucagon  Injectable 1 milliGRAM(s) IntraMuscular once  hydrALAZINE 100 milliGRAM(s) Oral every 8 hours  insulin glargine Injectable (LANTUS) 38 Unit(s) SubCutaneous at bedtime  insulin lispro (ADMELOG) corrective regimen sliding scale   SubCutaneous three times a day before meals  insulin lispro (ADMELOG) corrective regimen sliding scale   SubCutaneous at bedtime  insulin lispro Injectable (ADMELOG) 8 Unit(s) SubCutaneous before breakfast  insulin lispro Injectable (ADMELOG) 8 Unit(s) SubCutaneous before lunch  insulin lispro Injectable (ADMELOG) 8 Unit(s) SubCutaneous before dinner  lactulose Syrup 15 Gram(s) Oral <User Schedule>  lactulose Syrup 10 Gram(s) Oral every 6 hours  levothyroxine 88 MICROGram(s) Oral daily  lisinopril 10 milliGRAM(s) Oral daily  mupirocin 2% Nasal 1 Application(s) Both Nostrils two times a day  pantoprazole   Suspension 40 milliGRAM(s) Oral daily  polyethylene glycol 3350 17 Gram(s) Oral at bedtime  rosuvastatin 10 milliGRAM(s) Oral at bedtime  senna 2 Tablet(s) Oral at bedtime  tacrolimus 1 milliGRAM(s) Oral <User Schedule>  tacrolimus 1 milliGRAM(s) Oral at bedtime      wife (Ludmila), daughter (Luz Marina) and son (Jose J).    Assessment: Piero was [facility] for day 1 cycle 2 mini RCH(O)P for PTLD but suffered from urosepsis with Klebsiella ESBL (Zosyn sensitive) and nasal corona virus. Course further complicated by sustained high fevers (alyson's suspected - responsive to intermediate dose hydrocortisone).   Day 1 tafa/rev    Cystatin C eGFR: 40; standard eGFR calculation: 96    PMHx::  1) renal transplant 2012: left nephrectomy; renal transplant was secondary to DM, 2) AODM, 3) HLD, 4) hypothyroidism, 5) peripheral neuropathy  6) retroperitoneal fibrosis, 7) GERD, 8) HTN, 9) anxiety/depression, 10) obstructive sleep apnea, 11) osteomyelitis and E coli urosepsis (12/1/24 - 12/18/24)    Plan:  Hold planned chemotherapy. given toxic presentation and sustained high fevers unlikely to continue anthracycline-based treatment.   Start Tafa/REV -      ID:  ertapenem (1/20/25 - ),   zosyn (1/17/25 - 1/20/25)    Endo: as clinically well and fevers resolved: continue day +2 hydrocortisone 25mg IV q8 x 24 hours - change tomorrow.    Radiographs: Last chest/abd pelvis: 1/17/25, pelvic radiographs: pending; may also require MRI for avascular necrosis   Please repeat body CT scan without contrast.     Renal: continue /pred -- pred 5mg bid (50% dose reduction) 1/18/25  AT RISK FOR ADRENAL INSUFFIENCEY IF HYPOTENSIVE 50mg hydrocortisone IV q8     PM&R consulted: for inability to weight bare.    Social: need to discuss treatment with Tafa/Rev.  His current rehab facility will not allow for transport to outpatient oncology to treat his lymphoma.      Over 60 minutes were spent in direct patient care and care coordination.

## 2025-01-23 NOTE — PROGRESS NOTE ADULT - PROBLEM SELECTOR PLAN 2
Not neutropenic. febrile   1/17 - F/U CT C/A/P- Extensive new bilateral ground glass nodular opacities, upper lobe predominant. The etiology is unclear, question pneumonia. Small right and trace left pleural effusion. Right hepatic mass is not well evaluated but appears decreased in size  since 12/9/2024. Known splenic mass is poorly seen without contrast.  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  1/17- F/u Flu/COVID PCR , + for Coronavirus   (last hospitalization: osteomyelitis and E coli urosepsis (12/1/24 - 12/18/24)   [facility] (12/18/24): for osteomyelitis & E coli urosepsis).  D/C'd on 1/20  Zosyn O/N  1/18 - Oral candidiasis - Nystatin S/S  1/19- ID consult, followed by [facility], Zosyn changed to Ertapenem  history of ESBL Kleb in Ucx 12/31/24, ESBL E.coli 11/29/24 Ucx  1/21. PM&R evaluation. recomm repeat imaging to assess integrity of prior hip replacement and/or r/o pathological fracture.  1/22 Follow up CT scan, cont ertapenem

## 2025-01-23 NOTE — PROGRESS NOTE ADULT - PROBLEM SELECTOR PLAN 5
Continue Tacrolimus 1mg in am and 1 mg at HS.  Pred 5mg bid (50% dose reduction) 1/18/25 ON HOLD ON 1/22   AT RISK FOR ADRENAL INSUFFIENCEY IF HYPOTENSIVE 50mg hydrocortisone IV q8  1/22 Started Hydrocortisone 25 mg IV every 8 hrs started x 3 doses  will reassess in am

## 2025-01-23 NOTE — PROGRESS NOTE ADULT - SUBJECTIVE AND OBJECTIVE BOX
Diagnosis    Protocol/Chemo Regimen:  Day:      Pt endorsed:    Review of Systems:      Pain scale:                                        Location:    Diet:     Allergies    No Known Allergies    Intolerances        ANTIMICROBIALS  ertapenem  IVPB      ertapenem  IVPB 1000 milliGRAM(s) IV Intermittent every 24 hours      HEME/ONC MEDICATIONS  alteplase for catheter clearance 2 milliGRAM(s) Catheter once  alteplase for catheter clearance 2 milliGRAM(s) Catheter once  enoxaparin Injectable 40 milliGRAM(s) SubCutaneous <User Schedule>      STANDING MEDICATIONS  amLODIPine   Tablet 10 milliGRAM(s) Oral daily  buMETAnide Injectable 1 milliGRAM(s) IV Push once  buPROPion XL (24-Hour) . 300 milliGRAM(s) Oral daily  carvedilol 12.5 milliGRAM(s) Oral every 12 hours  chlorhexidine 4% Liquid 1 Application(s) Topical <User Schedule>  cloNIDine 0.1 milliGRAM(s) Oral three times a day  dextrose 5%. 1000 milliLiter(s) IV Continuous <Continuous>  dextrose 5%. 1000 milliLiter(s) IV Continuous <Continuous>  dextrose 50% Injectable 25 Gram(s) IV Push once  dextrose 50% Injectable 12.5 Gram(s) IV Push once  dextrose 50% Injectable 25 Gram(s) IV Push once  escitalopram 10 milliGRAM(s) Oral daily  glucagon  Injectable 1 milliGRAM(s) IntraMuscular once  glucagon  Injectable 1 milliGRAM(s) IntraMuscular once  hydrALAZINE 100 milliGRAM(s) Oral every 8 hours  insulin glargine Injectable (LANTUS) 38 Unit(s) SubCutaneous at bedtime  insulin lispro (ADMELOG) corrective regimen sliding scale   SubCutaneous three times a day before meals  insulin lispro (ADMELOG) corrective regimen sliding scale   SubCutaneous at bedtime  insulin lispro Injectable (ADMELOG) 8 Unit(s) SubCutaneous before breakfast  insulin lispro Injectable (ADMELOG) 8 Unit(s) SubCutaneous before lunch  insulin lispro Injectable (ADMELOG) 8 Unit(s) SubCutaneous before dinner  lactulose Syrup 15 Gram(s) Oral <User Schedule>  lactulose Syrup 10 Gram(s) Oral every 6 hours  levothyroxine 88 MICROGram(s) Oral daily  lisinopril 10 milliGRAM(s) Oral daily  mupirocin 2% Nasal 1 Application(s) Both Nostrils two times a day  pantoprazole   Suspension 40 milliGRAM(s) Oral daily  polyethylene glycol 3350 17 Gram(s) Oral at bedtime  rosuvastatin 10 milliGRAM(s) Oral at bedtime  senna 2 Tablet(s) Oral at bedtime  tacrolimus 1 milliGRAM(s) Oral at bedtime  tacrolimus 1 milliGRAM(s) Oral <User Schedule>      PRN MEDICATIONS  acetaminophen     Tablet .. 650 milliGRAM(s) Oral every 6 hours PRN  dextrose Oral Gel 15 Gram(s) Oral once PRN  polyethylene glycol 3350 17 Gram(s) Oral daily PRN  sodium chloride 0.9% lock flush 10 milliLiter(s) IV Push every 1 hour PRN        Vital Signs Last 24 Hrs  T(C): 37.1 (23 Jan 2025 09:10), Max: 37.9 (22 Jan 2025 21:23)  T(F): 98.7 (23 Jan 2025 09:10), Max: 100.2 (22 Jan 2025 21:23)  HR: 71 (23 Jan 2025 09:10) (66 - 79)  BP: 124/68 (23 Jan 2025 09:10) (122/66 - 164/77)  BP(mean): --  RR: 18 (23 Jan 2025 09:10) (16 - 18)  SpO2: 94% (23 Jan 2025 09:10) (91% - 94%)    Parameters below as of 23 Jan 2025 09:10  Patient On (Oxygen Delivery Method): nasal cannula  O2 Flow (L/min): 5      PHYSICAL EXAM  General: adult in NAD  HEENT: clear oropharynx, anicteric sclera, pink conjunctiva  Neck: supple  CV: normal S1/S2 RRR  Lungs: positive air movement b/l ant lungs,clear to auscultation, no wheezes, no rales  Abdomen: soft non-tender non-distended, no hepatosplenomegaly  Ext: no clubbing cyanosis or edema  Skin: no rashes and no petechiae  Neuro: alert and oriented X 4, no focal deficits  Central Line: normal    LABS:    Blood Cultures:                           8.1    7.93  )-----------( 563      ( 23 Jan 2025 06:57 )             25.4         Mean Cell Volume : 89.8 fl  Mean Cell Hemoglobin : 28.6 pg  Mean Cell Hemoglobin Concentration : 31.9 g/dL  Auto Neutrophil # : 7.04 K/uL  Auto Lymphocyte # : 0.20 K/uL  Auto Monocyte # : 0.29 K/uL  Auto Eosinophil # : 0.00 K/uL  Auto Basophil # : 0.03 K/uL  Auto Neutrophil % : 88.7 %  Auto Lymphocyte % : 2.5 %  Auto Monocyte % : 3.7 %  Auto Eosinophil % : 0.0 %  Auto Basophil % : 0.4 %      01-23    134[L]  |  102  |  24[H]  ----------------------------<  195[H]  4.6   |  22  |  0.84    Ca    9.0      23 Jan 2025 07:00  Phos  2.4     01-23  Mg     2.0     01-23    TPro  5.7[L]  /  Alb  2.3[L]  /  TBili  0.2  /  DBili  x   /  AST  48[H]  /  ALT  44  /  AlkPhos  95  01-23    PT/INR - ( 21 Jan 2025 18:48 )   PT: 12.6 sec;   INR: 1.10 ratio         PTT - ( 21 Jan 2025 18:48 )  PTT:35.8 sec      Uric Acid 5.5        RADIOLOGY & ADDITIONAL STUDIES:         Diagnosis: PTLD    Protocol/Chemo Regimen: (On hold)    Pt endorsed: general fatigue, weakness; sacral discomfort    Review of Systems:   Denies any nausea, vomiting chest pain, palpitation, SOB, abdominal pain.     Pain scale: sacral area pain, discomfort    Diet: Consistent carb    Allergies: No Known Allergies    ANTIMICROBIALS  ertapenem  IVPB 1000 milliGRAM(s) IV Intermittent every 24 hours    HEME/ONC MEDICATIONS  alteplase for catheter clearance 2 milliGRAM(s) Catheter once  alteplase for catheter clearance 2 milliGRAM(s) Catheter once  enoxaparin Injectable 40 milliGRAM(s) SubCutaneous <User Schedule>      STANDING MEDICATIONS  amLODIPine   Tablet 10 milliGRAM(s) Oral daily  buMETAnide Injectable 1 milliGRAM(s) IV Push once  buPROPion XL (24-Hour) . 300 milliGRAM(s) Oral daily  carvedilol 12.5 milliGRAM(s) Oral every 12 hours  chlorhexidine 4% Liquid 1 Application(s) Topical <User Schedule>  cloNIDine 0.1 milliGRAM(s) Oral three times a day  dextrose 5%. 1000 milliLiter(s) IV Continuous <Continuous>  dextrose 5%. 1000 milliLiter(s) IV Continuous <Continuous>  dextrose 50% Injectable 25 Gram(s) IV Push once  dextrose 50% Injectable 12.5 Gram(s) IV Push once  dextrose 50% Injectable 25 Gram(s) IV Push once  escitalopram 10 milliGRAM(s) Oral daily  glucagon  Injectable 1 milliGRAM(s) IntraMuscular once  glucagon  Injectable 1 milliGRAM(s) IntraMuscular once  hydrALAZINE 100 milliGRAM(s) Oral every 8 hours  insulin glargine Injectable (LANTUS) 38 Unit(s) SubCutaneous at bedtime  insulin lispro (ADMELOG) corrective regimen sliding scale   SubCutaneous three times a day before meals  insulin lispro (ADMELOG) corrective regimen sliding scale   SubCutaneous at bedtime  insulin lispro Injectable (ADMELOG) 8 Unit(s) SubCutaneous before breakfast  insulin lispro Injectable (ADMELOG) 8 Unit(s) SubCutaneous before lunch  insulin lispro Injectable (ADMELOG) 8 Unit(s) SubCutaneous before dinner  lactulose Syrup 15 Gram(s) Oral <User Schedule>  lactulose Syrup 10 Gram(s) Oral every 6 hours  levothyroxine 88 MICROGram(s) Oral daily  lisinopril 10 milliGRAM(s) Oral daily  mupirocin 2% Nasal 1 Application(s) Both Nostrils two times a day  pantoprazole   Suspension 40 milliGRAM(s) Oral daily  polyethylene glycol 3350 17 Gram(s) Oral at bedtime  rosuvastatin 10 milliGRAM(s) Oral at bedtime  senna 2 Tablet(s) Oral at bedtime  tacrolimus 1 milliGRAM(s) Oral at bedtime  tacrolimus 1 milliGRAM(s) Oral <User Schedule>      PRN MEDICATIONS  acetaminophen     Tablet .. 650 milliGRAM(s) Oral every 6 hours PRN  dextrose Oral Gel 15 Gram(s) Oral once PRN  polyethylene glycol 3350 17 Gram(s) Oral daily PRN  sodium chloride 0.9% lock flush 10 milliLiter(s) IV Push every 1 hour PRN      Vital Signs Last 24 Hrs  T(C): 37.1 (23 Jan 2025 09:10), Max: 37.9 (22 Jan 2025 21:23)  T(F): 98.7 (23 Jan 2025 09:10), Max: 100.2 (22 Jan 2025 21:23)  HR: 71 (23 Jan 2025 09:10) (66 - 79)  BP: 124/68 (23 Jan 2025 09:10) (122/66 - 164/77)  RR: 18 (23 Jan 2025 09:10) (16 - 18)  SpO2: 94% (23 Jan 2025 09:10) (91% - 94%)    Parameters below as of 23 Jan 2025 09:10  Patient On (Oxygen Delivery Method): nasal cannula  O2 Flow (L/min): 5    PHYSICAL EXAM  General: adult in NAD  HEENT: clear oropharynx, anicteric sclera, pink conjunctiva  Neck: supple  CV: normal S1/S2 RRR  Lungs: positive air movement b/l ant lungs,clear to auscultation, no wheezes, no rales  Abdomen: soft non-tender non-distended  Ext: no clubbing cyanosis or edema  Skin: no rashes and no petechiae  Neuro: alert and oriented X 4, no focal deficits  Central Line: PICC c/d/i    LABS:                        8.1    7.93  )-----------( 563      ( 23 Jan 2025 06:57 )             25.4     Mean Cell Volume : 89.8 fl  Mean Cell Hemoglobin : 28.6 pg  Mean Cell Hemoglobin Concentration : 31.9 g/dL  Auto Neutrophil # : 7.04 K/uL  Auto Lymphocyte # : 0.20 K/uL  Auto Monocyte # : 0.29 K/uL  Auto Eosinophil # : 0.00 K/uL  Auto Basophil # : 0.03 K/uL  Auto Neutrophil % : 88.7 %  Auto Lymphocyte % : 2.5 %  Auto Monocyte % : 3.7 %  Auto Eosinophil % : 0.0 %  Auto Basophil % : 0.4 %      01-23    134[L]  |  102  |  24[H]  ----------------------------<  195[H]  4.6   |  22  |  0.84    Ca    9.0      23 Jan 2025 07:00  Phos  2.4     01-23  Mg     2.0     01-23    TPro  5.7[L]  /  Alb  2.3[L]  /  TBili  0.2  /  DBili  x   /  AST  48[H]  /  ALT  44  /  AlkPhos  95  01-23    PT/INR - ( 21 Jan 2025 18:48 )   PT: 12.6 sec;   INR: 1.10 ratio    PTT - ( 21 Jan 2025 18:48 )  PTT:35.8 sec      Uric Acid 5.5    -----------    Cultures:  Culture - Blood (01.21.25 @ 22:25)    Specimen Source: .Blood BLOOD   Culture Results:   No growth at 24 hours    Culture - Blood (01.21.25 @ 22:07)    Specimen Source: .Blood BLOOD   Culture Results:   No growth at 24 hours    Culture - Blood (01.19.25 @ 18:36)    Specimen Source: .Blood BLOOD   Culture Results:   No growth at 72 Hours    -----------------    RADIOLOGY & ADDITIONAL STUDIES:  from: Xray Hip 3-4 Views, Bilateral (01.23.25 @ 11:10)   HIP AND PELVIC JOINTS  Postoperative Changes: Cemented left hip hemiarthroplasty is again   seen. Slight lucency is again seen at the bone cement interface of the   medial intertrochanteric region. There is mild adjacent heterotopic   ossification.  Joint Space(s):  Right hip and sacroiliac joints appear maintained.  OTHER FINDINGS:  Severe atherosclerotic calcifications are present.   Pelvic surgical clips are.  IMPRESSION:  1.  No acute osseous abnormalities.  2.  Sacral insufficiency fractures are better seen on CT.

## 2025-01-23 NOTE — PROGRESS NOTE ADULT - ASSESSMENT
70 y/o M PMHx renal transplant 2012 (2/2 DM, s/p left nephrectomy), peripheral neuropathy, hypothyroidism, retroperitoneal fibrosis, HTN, HLD, GERD, anxiety/depression, ANGELIKA and recent admission at [facility] for sacral osteomyelitis and ESBL.coli urosepsis discharged on 12/18/24 to rehab. While admitted to [place] was noted to have hypercalcemia and liver masses which were biopsied on 12/16/24, biopsy revealed DLBCL. Patient received R mini CHOP on 12/28 now admitted for cycle #2 Rmini CHOP, upon admission patient is febrile and Chemotherapy is on HOLD   Pt has been pancytopenic secondary to chemotherapy and or disease

## 2025-01-23 NOTE — CHART NOTE - NSCHARTNOTEFT_GEN_A_CORE
MEDICINE PA NOTE    JAN CALVIN  MRN-[id number]  Allergies  No Known Allergies    Called to evaluate patient for hypoxia, O2 sat down to 70s on venturi mask/NC. Improved to 87% on NRB. Pt seen at bedside, not in acute distress. Pt also noted to be febrile to 100.6F with rigors. Pt denies chest pain, palpitations, shortness of breath, n/v, abdominal pain.    Vital Signs Last 24 Hrs  T(C): 38.1 (01-23-25 @ 23:25), Max: 38.8 (01-23-25 @ 16:52)  T(F): 100.6 (01-23-25 @ 23:25), Max: 101.8 (01-23-25 @ 16:52)  HR: 100 (01-23-25 @ 23:25) (71 - 100)  BP: 141/75 (01-23-25 @ 23:25) (122/66 - 175/74)  BP(mean): --  RR: 20 (01-23-25 @ 23:25) (16 - 20)  SpO2: 87% (01-23-25 @ 23:25) (81% - 97%)    Radiology:  CT chest/abdomen/pelvis 1/23/25  IMPRESSION:  Diffuse bilateral patchy groundglass opacities with central predominance, increased from 1/17/2025, may represent edema or pneumonia.  Unchanged left lower lobe round atelectasis.   Small left pleural effusion. Unchanged retroperitoneal soft tissue encasing the aorta and IVC.   Diffuse erosive changes of the sacrum with soft tissue infiltration, similar in appearance to prior, which could represent osteomyelitis.    PHYSICAL EXAM:  GENERAL: NAD, well-developed  HEAD:  Atraumatic, Normocephalic  EYES: EOMI, PERRLA, conjunctiva and sclera clear  NECK: Supple, No JVD  CHEST/LUNG: Clear to auscultation bilaterally; No wheeze/cough  HEART: Regular rate and rhythm; No murmurs, rubs, or gallops  ABDOMEN: Soft, Nontender, Nondistended; Bowel sounds present  EXTREMITIES:  2+ Peripheral Pulses, No clubbing, cyanosis, or edema  PSYCH: AAOx3  NEUROLOGY: non-focal  SKIN: No rashes or lesions    Assessment/Plan: HPI:  M 70 y/o M PMHx renal transplant 2012 (2/2 DM, s/p left nephrectomy), peripheral neuropathy, hypothyroidism, retroperitoneal fibrosis, HTN, HLD, GERD, anxiety/depression, ANGELIKA and recent admission at [facility] for sacral osteomyelitis and ESBL.coli urosepsis discharged on 12/18/24 to rehab. While admitted to [place] was noted to have hypercalcemia and liver masses which were biopsied on 12/16/24, biopsy revealed DLBCL. Patient received R mini CHOP on 12/28 now admitted for cycle #2 Rmini CHOP, upon admission patient is febrile will hold chemotherapy today.  (17 Jan 2025 12:09)    #Hypoxia likely iso PNA  - Pt's O2 sat improved to 90s on NRB, will trial HFNC and wean as tolerated  - C/w ertapenem  - Ordered IV tylenol for fever/rigors  - Ordered repeat BCx, UA  - F/u ID recs  - Will continue to closely monitor pt's clinical presentation and vital signs  - Will endorse to AM team to follow    Jeremías Hannon PA-C  Medicine  [id number] MEDICINE PA NOTE    JAN CALVIN  MRN-[id number]  Allergies  No Known Allergies    Called to evaluate patient for hypoxia, O2 sat down to 70s on venturi mask/NC. Improved to 87% on NRB. Pt seen at bedside, not in acute distress. Pt also noted to be febrile to 100.6F with rigors. Pt denies chest pain, palpitations, shortness of breath, n/v, abdominal pain.    Vital Signs Last 24 Hrs  T(C): 38.1 (01-23-25 @ 23:25), Max: 38.8 (01-23-25 @ 16:52)  T(F): 100.6 (01-23-25 @ 23:25), Max: 101.8 (01-23-25 @ 16:52)  HR: 100 (01-23-25 @ 23:25) (71 - 100)  BP: 141/75 (01-23-25 @ 23:25) (122/66 - 175/74)  BP(mean): --  RR: 20 (01-23-25 @ 23:25) (16 - 20)  SpO2: 87% (01-23-25 @ 23:25) (81% - 97%)    Radiology:  CT chest/abdomen/pelvis 1/23/25  IMPRESSION:  Diffuse bilateral patchy groundglass opacities with central predominance, increased from 1/17/2025, may represent edema or pneumonia.  Unchanged left lower lobe round atelectasis.   Small left pleural effusion. Unchanged retroperitoneal soft tissue encasing the aorta and IVC.   Diffuse erosive changes of the sacrum with soft tissue infiltration, similar in appearance to prior, which could represent osteomyelitis.    PHYSICAL EXAM:  GENERAL: NAD, well-developed  HEAD:  Atraumatic, Normocephalic  EYES: EOMI, PERRLA, conjunctiva and sclera clear  NECK: Supple, No JVD  CHEST/LUNG: Clear to auscultation bilaterally; No wheeze/cough  HEART: Regular rate and rhythm; No murmurs, rubs, or gallops  ABDOMEN: Soft, Nontender, Nondistended; Bowel sounds present  EXTREMITIES:  2+ Peripheral Pulses, No clubbing, cyanosis, or edema  PSYCH: AAOx3  NEUROLOGY: non-focal  SKIN: No rashes or lesions    Assessment/Plan: HPI:  M 70 y/o M PMHx renal transplant 2012 (2/2 DM, s/p left nephrectomy), peripheral neuropathy, hypothyroidism, retroperitoneal fibrosis, HTN, HLD, GERD, anxiety/depression, ANGELIKA and recent admission at [facility] for sacral osteomyelitis and ESBL.coli urosepsis discharged on 12/18/24 to rehab. While admitted to [place] was noted to have hypercalcemia and liver masses which were biopsied on 12/16/24, biopsy revealed DLBCL. Patient received R mini CHOP on 12/28 now admitted for cycle #2 Rmini CHOP, upon admission patient is febrile will hold chemotherapy today.  (17 Jan 2025 12:09)    #Hypoxia likely iso PNA  - Pt's O2 sat improved to 90s on NRB, will trial HFNC and wean as tolerated. Ordered HFNC 100%/60L  - C/w ertapenem  - Ordered IV tylenol for fever/rigors  - Ordered repeat BCx, UA  - F/u ID recs  - Discussed with Dr. Garcia - agreed with above, also recommended starting vancomycin 1g q12hrs, and ordered fungitell, galactomannan, and LDH to r/o PCP  - Will continue to closely monitor pt's clinical presentation and vital signs  - Will endorse to AM team to follow    Jeremías Hannon PA-C  Medicine  [id number]

## 2025-01-23 NOTE — PROGRESS NOTE ADULT - ASSESSMENT
Patient is a 69 year old male with PMH of renal transplant 2012 (2/2 DM, s/p left nephrectomy), peripheral neuropathy, hypothyroidism, retroperitoneal fibrosis, HTN, HLD, GERD, anxiety/depression, ANGELIKA and recent admission at [facility] for ESBL E.coli urosepsis, imaging with ?sacral OM though pt with no sacral wound suspected findings likely related to mets, he was discharged on 12/18/24 to rehab, recently diagnosed DLBCL while admitted to [place] when he was noted to have hypercalcemia and liver masses s/p biopsy 12/16/24, now s/p R mini CHOP on 12/28 who was admitted 1/17 for cycle #2 Rmini CHOP, upon admission patient is febrile will hold chemotherapy now.    Fevers- may be d/t viral URI d/t coronavirus (not COVID) vs possible post viral pneumonia, may be malignancy related    - RVP with Coronavirus (229E,HKU1,NL63,OC43) detected  - 1/17 CT Chest with extensive new b/l ground glass nodular opacities, upper lobe dominant - possible pneumonia; small R and trace L effusions  - 1/17 CTAP decreased R hepatic mass since 12/9/24; known splenic mass; changes likely c/w retroperitoneal fibrosis   - UA with some pyuria, small LE, no nitrites or bacteria  - Ucx with >3 organisms - may be contaminated specimen    - 1/19 repeat Ucx with only low colony count of C.albicans, likely contaminant, no need to treat  - 1/17 and 1/19 Bcx NGTD  - LUE PICC line with no sign of infection, no sign of SSTI, no rashes noted   - wound care eval noted for sacral and ischial DTI  - prior cultures reviewed, history of ESBL Kleb in Ucx 12/31/24, ESBL E.coli 11/29/24 Ucx   - s/p zosyn 1/17-1/20, escalated to ertapenem 1/20pm due to fevers   - MRSA PCR screen negative (+MSSA)  - 1/22 CXR mild pulm vasc congestion, small L effusion, no focal consolidation noted   - fever curve improved-Tm 100.2F overnight, WBC wnl, nontoxic    Recommendations:   Follow repeat Bcx - NGTD x2  Follow Ucx, in process  Continue ertapenem  Monitor temps/CBC   Supportive care  Aspiration precautions  Wound care, offloading as able, nutrition  Continue rest of care per primary team       Risa Ac M.D.  [facility]  Available on [employer] TEAMS - *PREFERRED*  [phone]  After 5pm on weekdays and all day on weekends - please call [phone]     Thank you for consulting us and involving us in the management of this patients case. In addition to reviewing history, imaging, documents, labs, microbiology, took into account antibiotic stewardship, local antibiogram and infection control strategies and potential transmission issues.

## 2025-01-23 NOTE — PROGRESS NOTE ADULT - PROBLEM SELECTOR PROBLEM 11
Prophylactic measure Double Island Pedicle Flap Text: The defect edges were debeveled with a #15 scalpel blade.  Given the location of the defect, shape of the defect and the proximity to free margins a double island pedicle advancement flap was deemed most appropriate.  Using a sterile surgical marker, an appropriate advancement flap was drawn incorporating the defect, outlining the appropriate donor tissue and placing the expected incisions within the relaxed skin tension lines where possible.    The area thus outlined was incised deep to adipose tissue with a #15 scalpel blade.  The skin margins were undermined to an appropriate distance in all directions around the primary defect and laterally outward around the island pedicle utilizing iris scissors.  There was minimal undermining beneath the pedicle flap.

## 2025-01-23 NOTE — PROGRESS NOTE ADULT - PROBLEM SELECTOR PLAN 1
Admitted  for R mini CHOP, found to be febrile on admission hold  chemo   Monitor CBC with diff, transfuse as needed.  Monitor electrolytes, replete as needed.  Daily weights.  Strict I/O. mouth care  1/22 Hypophosphatemia sodium phosphate 15 Mmole IV x 1  1/22 Hypomagnesemia Magnesium oxide 500 mg PO x 1  1/23 planning for tafasitamab/lenalidomide with approvals

## 2025-01-23 NOTE — PROGRESS NOTE ADULT - SUBJECTIVE AND OBJECTIVE BOX
[facility]  WANG Mccoy Y. Patel, S. Shah, G. Casimir  [phone]  ([phone] - weekdays after 5pm and weekends)    Name: JAN CALVIN  Age/Gender: 69y Male  MRN: [id number]    Interval History:  Patient seen and examined this morning.  Denies fever, chills, pain, n/v/d, dysuria.   No new complaints noted.  Notes reviewed  No concerning overnight events  Allergies: No Known Allergies      Objective:  Vitals:   T(F): 99.5 (01-23-25 @ 05:31), Max: 101.4 (01-22-25 @ 09:35)  HR: 76 (01-23-25 @ 05:31) (66 - 85)  BP: 164/77 (01-23-25 @ 05:31) (122/66 - 164/77)  RR: 16 (01-23-25 @ 05:31) (16 - 18)  SpO2: 93% (01-23-25 @ 05:31) (91% - 94%)  Physical Examination:  General: no acute distress, on NC  HEENT: normocephalic, atraumatic, anicteric  Lungs: clear to auscultation anteriorly   Heart: S1, S2 present, normal rate  Abdomen: Soft, nontender, nondistended   Neuro: AAOx3, no obvious focal deficits   Extremities: No cyanosis. No edema.   Skin: Warm. Dry. No visible rash.   Lines: LUE PICC with no erythema/TTP    Laboratory Studies:  CBC:                       8.1    7.93  )-----------( 563      ( 23 Jan 2025 06:57 )             25.4     WBC Trend:  7.93 01-23-25 @ 06:57  7.67 01-22-25 @ 06:52  10.76 01-21-25 @ 18:48  7.31 01-20-25 @ 06:57  6.55 01-19-25 @ 06:51  7.68 01-18-25 @ 07:02  6.45 01-17-25 @ 12:55    CMP: 01-23    134[L]  |  102  |  24[H]  ----------------------------<  195[H]  4.6   |  22  |  0.84    Ca    9.0      23 Jan 2025 07:00  Phos  2.4     01-23  Mg     2.0     01-23    TPro  5.7[L]  /  Alb  2.3[L]  /  TBili  0.2  /  DBili  x   /  AST  48[H]  /  ALT  44  /  AlkPhos  95  01-23    Creatinine: 0.84 mg/dL (01-23-25 @ 07:00)  Creatinine: 0.82 mg/dL (01-22-25 @ 06:53)  Creatinine: 1.04 mg/dL (01-21-25 @ 18:48)  Creatinine: 1.15 mg/dL (01-20-25 @ 06:57)  Creatinine: 1.04 mg/dL (01-19-25 @ 06:54)  Creatinine: 0.93 mg/dL (01-18-25 @ 07:02)  Creatinine: 1.01 mg/dL (01-18-25 @ 02:41)  Creatinine: 0.81 mg/dL (01-17-25 @ 12:55)    LIVER FUNCTIONS - ( 23 Jan 2025 07:00 )  Alb: 2.3 g/dL / Pro: 5.7 g/dL / ALK PHOS: 95 U/L / ALT: 44 U/L / AST: 48 U/L / GGT: x           Microbiology: reviewed   Culture - Blood (collected 01-21-25 @ 22:25)  Source: .Blood BLOOD  Preliminary Report (01-23-25 @ 03:02):    No growth at 24 hours    Culture - Blood (collected 01-21-25 @ 22:07)  Source: .Blood BLOOD  Preliminary Report (01-23-25 @ 03:02):    No growth at 24 hours    Culture - Blood (collected 01-19-25 @ 18:36)  Source: .Blood BLOOD  Preliminary Report (01-22-25 @ 23:08):    No growth at 72 Hours    Culture - Urine (collected 01-19-25 @ 18:36)  Source: Clean Catch Clean Catch (Midstream)  Final Report (01-20-25 @ 21:26):    10,000 - 49,000 CFU/mL Candida albicans    "Susceptibilities not performed"    Culture - Urine (collected 01-17-25 @ 17:09)  Source: Clean Catch Clean Catch (Midstream)  Final Report (01-18-25 @ 20:03):    >=3 organisms. Probable collection contamination.    Culture - Blood (collected 01-17-25 @ 11:38)  Source: .Blood BLOOD  Final Report (01-22-25 @ 15:00):    No growth at 5 days    SARS-CoV-2 Result: Allison (17 Jan 2025 17:22)    Radiology: reviewed     Medications:  acetaminophen     Tablet .. 650 milliGRAM(s) Oral every 6 hours PRN  alteplase for catheter clearance 2 milliGRAM(s) Catheter once  alteplase for catheter clearance 2 milliGRAM(s) Catheter once  amLODIPine   Tablet 10 milliGRAM(s) Oral daily  buPROPion XL (24-Hour) . 300 milliGRAM(s) Oral daily  carvedilol 12.5 milliGRAM(s) Oral every 12 hours  chlorhexidine 4% Liquid 1 Application(s) Topical <User Schedule>  cloNIDine 0.1 milliGRAM(s) Oral three times a day  dextrose 5%. 1000 milliLiter(s) IV Continuous <Continuous>  dextrose 5%. 1000 milliLiter(s) IV Continuous <Continuous>  dextrose 50% Injectable 25 Gram(s) IV Push once  dextrose 50% Injectable 12.5 Gram(s) IV Push once  dextrose 50% Injectable 25 Gram(s) IV Push once  dextrose Oral Gel 15 Gram(s) Oral once PRN  enoxaparin Injectable 40 milliGRAM(s) SubCutaneous <User Schedule>  ertapenem  IVPB      ertapenem  IVPB 1000 milliGRAM(s) IV Intermittent every 24 hours  escitalopram 10 milliGRAM(s) Oral daily  glucagon  Injectable 1 milliGRAM(s) IntraMuscular once  glucagon  Injectable 1 milliGRAM(s) IntraMuscular once  hydrALAZINE 100 milliGRAM(s) Oral every 8 hours  insulin glargine Injectable (LANTUS) 38 Unit(s) SubCutaneous at bedtime  insulin lispro (ADMELOG) corrective regimen sliding scale   SubCutaneous three times a day before meals  insulin lispro (ADMELOG) corrective regimen sliding scale   SubCutaneous at bedtime  insulin lispro Injectable (ADMELOG) 8 Unit(s) SubCutaneous before lunch  insulin lispro Injectable (ADMELOG) 8 Unit(s) SubCutaneous before dinner  insulin lispro Injectable (ADMELOG) 8 Unit(s) SubCutaneous before breakfast  lactulose Syrup 15 Gram(s) Oral <User Schedule>  lactulose Syrup 10 Gram(s) Oral every 6 hours  levothyroxine 88 MICROGram(s) Oral daily  lisinopril 10 milliGRAM(s) Oral daily  mupirocin 2% Nasal 1 Application(s) Both Nostrils two times a day  pantoprazole   Suspension 40 milliGRAM(s) Oral daily  polyethylene glycol 3350 17 Gram(s) Oral at bedtime  polyethylene glycol 3350 17 Gram(s) Oral daily PRN  rosuvastatin 10 milliGRAM(s) Oral at bedtime  senna 2 Tablet(s) Oral at bedtime  sodium chloride 0.9% lock flush 10 milliLiter(s) IV Push every 1 hour PRN  tacrolimus 1 milliGRAM(s) Oral at bedtime  tacrolimus 1 milliGRAM(s) Oral <User Schedule>    Current Antimicrobials:  ertapenem  IVPB      ertapenem  IVPB 1000 milliGRAM(s) IV Intermittent every 24 hours    Prior/Completed Antimicrobials:  ertapenem  IVPB  piperacillin/tazobactam IVPB.  piperacillin/tazobactam IVPB.-

## 2025-01-23 NOTE — PHARMACOTHERAPY INTERVENTION NOTE - COMMENTS
Clinical Pharmacy Specialist- Hematology/Oncology- Progress Note    Pt is a 70 y/o male with PMH of renal transplant (2012 left nephrectomy), peripheral neuropathy, hypothyroidism, retroperitoneal fibrosis, HTN, HLD, GERD, anxiety/depression, ANGELIKA, and newly diagnosed DLBCL, s/p R mini CHOP x 1 cycle. Was due to be admitted for Cycle 2, but course complicated by infection    Antimicrobial Course (ID Following, MD Risa Ac):  - Zosyn - 1/18- 1/20  --> Ertapenem (per ID, h/o ESBL UTI 12/2024)- 1/20  MRSA nasal swab    Last Neutropenic (ANC<1000): no occurrence  Last Febrile: 1/22@11:13 T= 100.7  Days Non-Neutropenic: 6  Days afebrile: 0    Chemotherapy Course  -Current Regimen: mini- R-CHOP  History:  (12/28/24)- C1 mini-RCHOP  -Day: 27 (1/23)  BmBx:  Access:     History/Relevant clinical information used in assessment:      Assessment/Plan/Recommendation:  - adrenal insufficiency- hydrocortisone 25mv q8hr tis x 3 doses given yest 1/22- back to prednisone 5 bid today?  - discussed next line of therapy may be tafasitamab + lenalidomide- exploring auth issues  - monitor for drug procurement if approved - currently only 800mg of tafa in stick (pt needs 907mg), and REMS pathway for lenalidomide  ---------------------------------------------------------  CrCl vs Cystatin-C EGFR (1/17/25)  - CrCl= 95ml/min (scr= 0.81  - Cystatin-C EGFR= 40ml/min (Cystatin-C= 1.63)  - 58% dose difference    Additional Monitoring Needed?   -Yes- Continue to monitor renal function & daily counts for abx escalation/de-escalation   -Discharge Planning:  --> New meds:  --> Meds sent for auth:  --> Delivered meds:    Case discussed with attending/primary team    Gisella Cuba PharmD, BCPS  Clinical Pharmacy Specialist | Hematology/Oncology  [facility]  Email: [email or web address] or available on [employer] Clinical Pharmacy Specialist- Hematology/Oncology- Progress Note    Pt is a 70 y/o male with PMH of renal transplant (2012 left nephrectomy), peripheral neuropathy, hypothyroidism, retroperitoneal fibrosis, HTN, HLD, GERD, anxiety/depression, ANGELIKA, and newly diagnosed DLBCL, s/p R mini CHOP x 1 cycle. Was due to be admitted for Cycle 2, but course complicated by infection    Antimicrobial Course (ID Following, MD Risa Ac):  - Zosyn - 1/18- 1/20  --> Ertapenem (per ID, h/o ESBL UTI 12/2024)- 1/20  MRSA nasal swab    Last Neutropenic (ANC<1000): no occurrence  Last Febrile: 1/22@11:13 T= 100.7  Days Non-Neutropenic: 6  Days afebrile: 0    Chemotherapy Course  -Current Regimen: mini- R-CHOP  History:  (12/28/24)- C1 mini-RCHOP  -Day: 27 (1/23)  BmBx:  Access:     History/Relevant clinical information used in assessment:      Assessment/Plan/Recommendation:  - adrenal insufficiency- hydrocortisone 25mg q8hr tid x 3 doses given yest 1/22- back to prednisone 5 bid today?- cont hydrocortisone 25mg tid, convert to PO when ready  - discussed next line of therapy may be tafasitamab + lenalidomide- exploring auth issues  - monitor for drug procurement if approved - currently only 800mg of tafa in stick (pt needs 907mg), and REMS pathway for lenalidomide  ---------------------------------------------------------  CrCl vs Cystatin-C EGFR (1/17/25)  - CrCl= 95ml/min (scr= 0.81  - Cystatin-C EGFR= 40ml/min (Cystatin-C= 1.63)  - 58% dose difference    Additional Monitoring Needed?   -Yes- Continue to monitor renal function & daily counts for abx escalation/de-escalation   -Discharge Planning:  --> New meds:  --> Meds sent for auth:  --> Delivered meds:    Case discussed with attending/primary team    Gisella Cuba, PharmD, BCPS  Clinical Pharmacy Specialist | Hematology/Oncology  [facility]  Email: [email or web address] or available on [employer] Clinical Pharmacy Specialist- Hematology/Oncology- Progress Note    Pt is a 68 y/o male with PMH of renal transplant (2012 left nephrectomy), peripheral neuropathy, hypothyroidism, retroperitoneal fibrosis, HTN, HLD, GERD, anxiety/depression, ANGELIKA, and newly diagnosed DLBCL, s/p R mini CHOP x 1 cycle. Was due to be admitted for Cycle 2, but course complicated by infection    Antimicrobial Course (ID Following, MD Risa Ac):  - Zosyn - 1/18- 1/20  --> Ertapenem (per ID, h/o ESBL UTI 12/2024)- 1/20  MRSA nasal swab    Last Neutropenic (ANC<1000): no occurrence  Last Febrile: 1/22@11:13 T= 100.7  Days Non-Neutropenic: 6  Days afebrile: 0    Chemotherapy Course  -Current Regimen: mini- R-CHOP  History:  (12/28/24)- C1 mini-RCHOP  -Day: 27 (1/23)  BmBx:  Access:     History/Relevant clinical information used in assessment:      Assessment/Plan/Recommendation:  - adrenal insufficiency- hydrocortisone 25mg q8hr tid x 3 doses given yest 1/22- back to prednisone 5 bid today?- cont hydrocortisone 25mg IVP tid, convert to PO when ready; (equiv to pred 19mg, dex 2.8mg, methylpred 15mg PO)  - discussed next line of therapy is tafasitamab + lenalidomide 10mg D1-14 -non-form approval pending, will reach out to outpt team for revlimid (has REMS requirements)    Tafasitamab dosing schema:  -C1-Tafasitamab 12mg/kg D1,4,8,15,22   -C2&3- Tafasitamab 12mg/kg D1,8,15,22   -C4 onward- Tafasitamab 12mg/kg D1,15   ---------------------------------------------------------  CrCl vs Cystatin-C EGFR (1/17/25)  - CrCl= 95ml/min (scr= 0.81  - Cystatin-C EGFR= 40ml/min (Cystatin-C= 1.63)  - 58% dose difference    Additional Monitoring Needed?   -Yes- Continue to monitor renal function & daily counts for abx escalation/de-escalation   -Discharge Planning:  --> New meds:  --> Meds sent for auth:  --> Delivered meds:    Case discussed with attending/primary team    Gisella Cuba, PharmD, BCPS  Clinical Pharmacy Specialist | Hematology/Oncology  [facility]  Email: [email or web address] or available on [employer] Clinical Pharmacy Specialist- Hematology/Oncology- Progress Note    Pt is a 70 y/o male with PMH of renal transplant (2012 left nephrectomy), peripheral neuropathy, hypothyroidism, retroperitoneal fibrosis, HTN, HLD, GERD, anxiety/depression, ANGELIKA, and newly diagnosed DLBCL, s/p R mini CHOP x 1 cycle. Was due to be admitted for Cycle 2, but course complicated by infection    Antimicrobial Course (ID Following, MD Risa Ac):  - Zosyn - 1/18- 1/20  --> Ertapenem (per ID, h/o ESBL UTI 12/2024)- 1/20  MRSA nasal swab    Last Neutropenic (ANC<1000): no occurrence  Last Febrile: 1/22@11:13 T= 100.7  Days Non-Neutropenic: 6  Days afebrile: 0    Chemotherapy Course  -Current Regimen: mini- R-CHOP  History:  (12/28/24)- C1 mini-RCHOP  -Day: 27 (1/23)  BmBx:  Access:     History/Relevant clinical information used in assessment:      Assessment/Plan/Recommendation:  - adrenal insufficiency- hydrocortisone 25mg q8hr tid x 3 doses given yest 1/22- back to prednisone 5 bid today?- cont hydrocortisone 25mg IVP tid, convert to PO when ready; (equiv to pred 19mg, dex 2.8mg, methylpred 15mg PO)  - discussed next line of therapy is tafasitamab + lenalidomide 10mg D1-14 -non-form approval obtained,  outpt team obtaining revlimid (REMS requirements)  - for lenalidomide- pt will need AC- lovenox ok; can d/c on aspirin 81mg daily     Tafasitamab dosing schema:  -C1-Tafasitamab 12mg/kg D1,4,8,15,22   -C2&3- Tafasitamab 12mg/kg D1,8,15,22   -C4 onward- Tafasitamab 12mg/kg D1,15   ---------------------------------------------------------  CrCl vs Cystatin-C EGFR (1/17/25)  - CrCl= 95ml/min (scr= 0.81  - Cystatin-C EGFR= 40ml/min (Cystatin-C= 1.63)  - 58% dose difference    Additional Monitoring Needed?   -Yes- Continue to monitor renal function & daily counts for abx escalation/de-escalation   -Discharge Planning:  --> New meds:  --> Meds sent for auth:  --> Delivered meds:    Case discussed with attending/primary team    David CurielD, BCPS  Clinical Pharmacy Specialist | Hematology/Oncology  [facility]  Email: [email or web address] or available on [employer]

## 2025-01-23 NOTE — CHART NOTE - NSCHARTNOTEFT_GEN_A_CORE
NUTRITION FOLLOW UP NOTE    PATIENT SEEN FOR: malnutrition follow-up     SOURCE: [x] Patient  [x] Current Medical Record  [x] RN  [] Family/support person at bedside  [] Patient unavailable/inappropriate  [] Other:    CHART REVIEWED/EVENTS NOTED.  [] No changes to nutrition care plan to note  [x] Nutrition Status:  -  DLBCL plan for cycle #2 Rmini CHOP, upon admission patient is febrile and Chemotherapy is on hold  - history of renal transplant     DIET ORDER:   Diet, Consistent Carbohydrate/No Snacks (25)    CURRENT DIET ORDER IS:  [] Appropriate:  [] Inadequate:  [] Other:    NUTRITION INTAKE/PROVISION:  [x] PO: also with diet Mighty Shakes 3x daily.   [] Enteral Nutrition:  [] Parenteral Nutrition:    ANTHROPOMETRICS:  Drug Dosing Weight  Height (cm): 182.9 (2025 10:03)  Weight (kg): 75.6 (2025 10:03)  BMI (kg/m2): 22.6 (2025 10:03)  Weights:   Daily Weight in k.2 (-), Weight in k.5 (-), Weight in k.2 (-), Weight in k.6 (-), Weight in k.7 (18)   weight fluctuation might due to fluid shift in house. Will continue to monitor weight trends as available/able.     MEDICATIONS:  MEDICATIONS  (STANDING):  alteplase for catheter clearance 2 milliGRAM(s) Catheter once  alteplase for catheter clearance 2 milliGRAM(s) Catheter once  amLODIPine   Tablet 10 milliGRAM(s) Oral daily  buPROPion XL (24-Hour) . 300 milliGRAM(s) Oral daily  carvedilol 12.5 milliGRAM(s) Oral every 12 hours  chlorhexidine 4% Liquid 1 Application(s) Topical <User Schedule>  cloNIDine 0.1 milliGRAM(s) Oral three times a day  dextrose 5%. 1000 milliLiter(s) (50 mL/Hr) IV Continuous <Continuous>  dextrose 5%. 1000 milliLiter(s) (100 mL/Hr) IV Continuous <Continuous>  dextrose 50% Injectable 25 Gram(s) IV Push once  dextrose 50% Injectable 12.5 Gram(s) IV Push once  dextrose 50% Injectable 25 Gram(s) IV Push once  enoxaparin Injectable 40 milliGRAM(s) SubCutaneous <User Schedule>  ertapenem  IVPB      ertapenem  IVPB 1000 milliGRAM(s) IV Intermittent every 24 hours  escitalopram 10 milliGRAM(s) Oral daily  glucagon  Injectable 1 milliGRAM(s) IntraMuscular once  glucagon  Injectable 1 milliGRAM(s) IntraMuscular once  hydrALAZINE 100 milliGRAM(s) Oral every 8 hours  insulin glargine Injectable (LANTUS) 38 Unit(s) SubCutaneous at bedtime  insulin lispro (ADMELOG) corrective regimen sliding scale   SubCutaneous three times a day before meals  insulin lispro (ADMELOG) corrective regimen sliding scale   SubCutaneous at bedtime  insulin lispro Injectable (ADMELOG) 8 Unit(s) SubCutaneous before breakfast  insulin lispro Injectable (ADMELOG) 8 Unit(s) SubCutaneous before lunch  insulin lispro Injectable (ADMELOG) 8 Unit(s) SubCutaneous before dinner  lactulose Syrup 15 Gram(s) Oral <User Schedule>  lactulose Syrup 10 Gram(s) Oral every 6 hours  levothyroxine 88 MICROGram(s) Oral daily  lisinopril 10 milliGRAM(s) Oral daily  mupirocin 2% Nasal 1 Application(s) Both Nostrils two times a day  pantoprazole   Suspension 40 milliGRAM(s) Oral daily  polyethylene glycol 3350 17 Gram(s) Oral at bedtime  rosuvastatin 10 milliGRAM(s) Oral at bedtime  senna 2 Tablet(s) Oral at bedtime  tacrolimus 1 milliGRAM(s) Oral <User Schedule>  tacrolimus 1 milliGRAM(s) Oral at bedtime    MEDICATIONS  (PRN):  acetaminophen     Tablet .. 650 milliGRAM(s) Oral every 6 hours PRN Temp greater or equal to 38C (100.4F), Mild Pain (1 - 3)  dextrose Oral Gel 15 Gram(s) Oral once PRN Blood Glucose LESS THAN 70 milliGRAM(s)/deciliter  polyethylene glycol 3350 17 Gram(s) Oral daily PRN for constipation  sodium chloride 0.9% lock flush 10 milliLiter(s) IV Push every 1 hour PRN Pre/post blood products, medications, blood draw, and to maintain line patency      NUTRITIONALLY PERTINENT LABS:   Na134 mmol/L[L] Glu 195 mg/dL[H] K+ 4.6 mmol/L Cr  0.84 mg/dL BUN 24 mg/dL[H]  Phos 2.4 mg/dL[L]  Alb 2.3 g/dL[L] ALT 44 U/L AST 48 U/L[H] Alkaline Phosphatase 95 U/L    A1C with Estimated Average Glucose Result: 7.8 % (24 @ 07:07)  A1C with Estimated Average Glucose Result: 7.7 % (24 @ 10:06)  A1C with Estimated Average Glucose Result: 7.4 % (24 @ 06:40)    Finger Sticks:  POCT Blood Glucose.: 139 mg/dL ( @ 13:50)  POCT Blood Glucose.: 222 mg/dL ( @ 08:34)  POCT Blood Glucose.: 215 mg/dL ( @ 22:02)  POCT Blood Glucose.: 230 mg/dL ( @ 18:05)    NUTRITIONALLY PERTINENT MEDICATIONS/LABS:  [x] Reviewed  [x] Relevant notes on medications/labs:  - renal transplant history on Tacrolimus   - DM: ordered for Lantus 38 units QHS, Lispro 8 units TID and ISS to regulate blood glucose in house  - noted hyponatremia and hypophosphatemia today     EDEMA:  [x] Reviewed  [] Relevant notes:    GI/ I&O:  [x] Reviewed  [x] Relevant notes: last BM , ordered for Lactulose, Miralax and Senna   [] Other: on Protonix     SKIN:   [] No pressure injuries documented, per nursing flowsheet  [x] Pressure injury previously noted: per wound care note :  "bilateral sacrum/ buttock deep tissue injury with evolution, present on admission.  right ischium deep tissue injury, present on admission.  left ischium deep tissue injury, present on admission."  [] Change in pressure injury documentation:  [] Other:    ESTIMATED NEEDS:  [x] No change:  [] Updated:  Energy: 5748-0756 kcal/day (28-33 kcal/kg)  Protein:   g/day (1.3-1.5 g/kg)  Fluid:   ml/day or [x] defer to team  Based on: dosing weight of 75.6kg    NUTRITION DIAGNOSIS:  [x] Prior Dx: acute on chronic moderate malnutrition, Increased nutrient needs   [] New Dx:    EDUCATION:  [] Yes:  [] Not appropriate/warranted    NUTRITION CARE PLAN:  1. Diet:  2. Supplements:  3. Multivitamin/mineral supplementation:  4:     [] Achieved - Continue current nutrition intervention(s)  [] Current medical condition precludes nutrition intervention at this time.    MONITORING AND EVALUATION:   RD remains available upon request and will follow up per protocol:   Billie Villavicencio MS, RDN, CDN   Available on MS TEAMS NUTRITION FOLLOW UP NOTE    PATIENT SEEN FOR: malnutrition follow-up     SOURCE: [] Patient  [x] Current Medical Record  [x] RN  [x] Wife (Ludmila) at bedside  [x] Patient unavailable/inappropriate- off unit upon visit  [] Other:    CHART REVIEWED/EVENTS NOTED.  [] No changes to nutrition care plan to note  [x] Nutrition Status:  -  DLBCL plan for cycle #2 Rmini CHOP, upon admission patient is febrile and Chemotherapy is on hold  - history of renal transplant     DIET ORDER:   Diet, Consistent Carbohydrate/No Snacks (25)    CURRENT DIET ORDER IS:  [] Appropriate:  [] Inadequate:  [x] Other: see below for recommendation     NUTRITION INTAKE/PROVISION:  [x] PO: wife reports pt with very good appetite, states he is hungry all the time, has been eating all of the foods provided. also getting diet Mighty Shakes 3x daily. Wife is requesting for protein supplements (will try Glucerna). Wife made aware of menu ordering procedure in house with menu provided, encourage wife to order for pt as needed (reviewed low sugar desserts on the menu with her).   [] Enteral Nutrition:  [] Parenteral Nutrition:    ANTHROPOMETRICS:  Drug Dosing Weight  Height (cm): 182.9 (2025 10:03)  Weight (kg): 75.6 (2025 10:03)  BMI (kg/m2): 22.6 (2025 10:03)  Weights:   Daily Weight in k.2 (), Weight in k.5 (), Weight in k.2 (), Weight in k.6 (), Weight in k.7 ()   weight fluctuation might due to fluid shift in house. Will continue to monitor weight trends as available/able.     MEDICATIONS:  MEDICATIONS  (STANDING):  alteplase for catheter clearance 2 milliGRAM(s) Catheter once  alteplase for catheter clearance 2 milliGRAM(s) Catheter once  amLODIPine   Tablet 10 milliGRAM(s) Oral daily  buPROPion XL (24-Hour) . 300 milliGRAM(s) Oral daily  carvedilol 12.5 milliGRAM(s) Oral every 12 hours  chlorhexidine 4% Liquid 1 Application(s) Topical <User Schedule>  cloNIDine 0.1 milliGRAM(s) Oral three times a day  dextrose 5%. 1000 milliLiter(s) (50 mL/Hr) IV Continuous <Continuous>  dextrose 5%. 1000 milliLiter(s) (100 mL/Hr) IV Continuous <Continuous>  dextrose 50% Injectable 25 Gram(s) IV Push once  dextrose 50% Injectable 12.5 Gram(s) IV Push once  dextrose 50% Injectable 25 Gram(s) IV Push once  enoxaparin Injectable 40 milliGRAM(s) SubCutaneous <User Schedule>  ertapenem  IVPB      ertapenem  IVPB 1000 milliGRAM(s) IV Intermittent every 24 hours  escitalopram 10 milliGRAM(s) Oral daily  glucagon  Injectable 1 milliGRAM(s) IntraMuscular once  glucagon  Injectable 1 milliGRAM(s) IntraMuscular once  hydrALAZINE 100 milliGRAM(s) Oral every 8 hours  insulin glargine Injectable (LANTUS) 38 Unit(s) SubCutaneous at bedtime  insulin lispro (ADMELOG) corrective regimen sliding scale   SubCutaneous three times a day before meals  insulin lispro (ADMELOG) corrective regimen sliding scale   SubCutaneous at bedtime  insulin lispro Injectable (ADMELOG) 8 Unit(s) SubCutaneous before breakfast  insulin lispro Injectable (ADMELOG) 8 Unit(s) SubCutaneous before lunch  insulin lispro Injectable (ADMELOG) 8 Unit(s) SubCutaneous before dinner  lactulose Syrup 15 Gram(s) Oral <User Schedule>  lactulose Syrup 10 Gram(s) Oral every 6 hours  levothyroxine 88 MICROGram(s) Oral daily  lisinopril 10 milliGRAM(s) Oral daily  mupirocin 2% Nasal 1 Application(s) Both Nostrils two times a day  pantoprazole   Suspension 40 milliGRAM(s) Oral daily  polyethylene glycol 3350 17 Gram(s) Oral at bedtime  rosuvastatin 10 milliGRAM(s) Oral at bedtime  senna 2 Tablet(s) Oral at bedtime  tacrolimus 1 milliGRAM(s) Oral <User Schedule>  tacrolimus 1 milliGRAM(s) Oral at bedtime    MEDICATIONS  (PRN):  acetaminophen     Tablet .. 650 milliGRAM(s) Oral every 6 hours PRN Temp greater or equal to 38C (100.4F), Mild Pain (1 - 3)  dextrose Oral Gel 15 Gram(s) Oral once PRN Blood Glucose LESS THAN 70 milliGRAM(s)/deciliter  polyethylene glycol 3350 17 Gram(s) Oral daily PRN for constipation  sodium chloride 0.9% lock flush 10 milliLiter(s) IV Push every 1 hour PRN Pre/post blood products, medications, blood draw, and to maintain line patency      NUTRITIONALLY PERTINENT LABS:   Na134 mmol/L[L] Glu 195 mg/dL[H] K+ 4.6 mmol/L Cr  0.84 mg/dL BUN 24 mg/dL[H]  Phos 2.4 mg/dL[L]  Alb 2.3 g/dL[L] ALT 44 U/L AST 48 U/L[H] Alkaline Phosphatase 95 U/L    A1C with Estimated Average Glucose Result: 7.8 % (24 @ 07:07)  A1C with Estimated Average Glucose Result: 7.7 % (24 @ 10:06)  A1C with Estimated Average Glucose Result: 7.4 % (24 @ 06:40)    Finger Sticks:  POCT Blood Glucose.: 139 mg/dL ( @ 13:50)  POCT Blood Glucose.: 222 mg/dL ( @ 08:34)  POCT Blood Glucose.: 215 mg/dL ( @ 22:02)  POCT Blood Glucose.: 230 mg/dL ( @ 18:05)    NUTRITIONALLY PERTINENT MEDICATIONS/LABS:  [x] Reviewed  [x] Relevant notes on medications/labs:  - renal transplant history on Tacrolimus   - DM: ordered for Lantus 38 units QHS, Lispro 8 units TID and ISS to regulate blood glucose in house  - noted hyponatremia and hypophosphatemia today     EDEMA:  [x] Reviewed  [] Relevant notes:    GI/ I&O:  [x] Reviewed  [x] Relevant notes: last BM , ordered for Lactulose, Miralax and Senna   [] Other: on Protonix     SKIN:   [] No pressure injuries documented, per nursing flowsheet  [x] Pressure injury previously noted: per wound care note :  "bilateral sacrum/ buttock deep tissue injury with evolution, present on admission.  right ischium deep tissue injury, present on admission.  left ischium deep tissue injury, present on admission."  [] Change in pressure injury documentation:  [] Other:    ESTIMATED NEEDS:  [x] No change:  [] Updated:  Energy: 6245-5649 kcal/day (28-33 kcal/kg)  Protein:   g/day (1.3-1.5 g/kg)  Fluid:   ml/day or [x] defer to team  Based on: dosing weight of 75.6kg    NUTRITION DIAGNOSIS:  [x] Prior Dx: acute on chronic moderate malnutrition, Increased nutrient needs   [] New Dx:    EDUCATION:  [x] Yes: to wife: Emphasized the importance of adequate kcal and protein intake; recommend to optimize nutritional intake in case of decreased appetite; recommended small frequent meals by ordering nutrient-dense snacks and leaving non-perishable food away from tray for later consumption during the day or between meals; to start with protein, and sips of supplement throughout the day; reviewed foods with protein and menu order procedures in hospital.   [] Not appropriate/warranted    NUTRITION CARE PLAN:  1. Diet: continue Consistent Carbohydrate diet, Diet texture per medical team/SLP. RD remains available to adjust diet as needed.   2. Supplements: Recommend Glucerna 1x daily (285 calories, 14g protein) to provide additional calories and nutrients to encourage PO intake while in house.  - RD will continue with diet Mighty Shakes 3x daily.   3. Multivitamin/mineral supplementation: consider MVI/VC, pending no medical contraindication, for micronutrient support/aid wound healing.   4: Monitor and encourage PO intake. Encourage use of daily menus. Honor dietary preferences as expressed as able.     [] Achieved - Continue current nutrition intervention(s)  [] Current medical condition precludes nutrition intervention at this time.    MONITORING AND EVALUATION:   RD remains available upon request and will follow up per protocol:   Billie Villavicencio MS, RDN, CDN   Available on MS TEAMS

## 2025-01-23 NOTE — PROVIDER CONTACT NOTE (OTHER) - ASSESSMENT
pt rigoring, febrile 100.6, O2 saturation 80s% on NC and VM 50%. pt rigoring, febrile 100.6, O2 saturation 70s% on NC and VM 50%. placed on NRB, pt saturation noted to be 87%. pt not in distress, denies SOB. Lung sounds are clear.

## 2025-01-24 LAB
ALBUMIN SERPL ELPH-MCNC: 2.3 G/DL — LOW (ref 3.3–5)
ALP SERPL-CCNC: 96 U/L — SIGNIFICANT CHANGE UP (ref 40–120)
ALT FLD-CCNC: 43 U/L — SIGNIFICANT CHANGE UP (ref 10–45)
ANION GAP SERPL CALC-SCNC: 13 MMOL/L — SIGNIFICANT CHANGE UP (ref 5–17)
APPEARANCE UR: CLEAR — SIGNIFICANT CHANGE UP
AST SERPL-CCNC: 64 U/L — HIGH (ref 10–40)
BASOPHILS # BLD AUTO: 0.05 K/UL — SIGNIFICANT CHANGE UP (ref 0–0.2)
BASOPHILS NFR BLD AUTO: 0.5 % — SIGNIFICANT CHANGE UP (ref 0–2)
BILIRUB SERPL-MCNC: 0.3 MG/DL — SIGNIFICANT CHANGE UP (ref 0.2–1.2)
BILIRUB UR-MCNC: NEGATIVE — SIGNIFICANT CHANGE UP
BLD GP AB SCN SERPL QL: NEGATIVE — SIGNIFICANT CHANGE UP
BUN SERPL-MCNC: 26 MG/DL — HIGH (ref 7–23)
CALCIUM SERPL-MCNC: 9.1 MG/DL — SIGNIFICANT CHANGE UP (ref 8.4–10.5)
CHLORIDE SERPL-SCNC: 97 MMOL/L — SIGNIFICANT CHANGE UP (ref 96–108)
CO2 SERPL-SCNC: 22 MMOL/L — SIGNIFICANT CHANGE UP (ref 22–31)
COLOR SPEC: YELLOW — SIGNIFICANT CHANGE UP
CREAT SERPL-MCNC: 0.93 MG/DL — SIGNIFICANT CHANGE UP (ref 0.5–1.3)
CULTURE RESULTS: ABNORMAL
CULTURE RESULTS: SIGNIFICANT CHANGE UP
DIFF PNL FLD: NEGATIVE — SIGNIFICANT CHANGE UP
EGFR: 90 ML/MIN/1.73M2 — SIGNIFICANT CHANGE UP
EOSINOPHIL # BLD AUTO: 0.02 K/UL — SIGNIFICANT CHANGE UP (ref 0–0.5)
EOSINOPHIL NFR BLD AUTO: 0.2 % — SIGNIFICANT CHANGE UP (ref 0–6)
FLUAV AG NPH QL: SIGNIFICANT CHANGE UP
FLUBV AG NPH QL: SIGNIFICANT CHANGE UP
GLUCOSE BLDC GLUCOMTR-MCNC: 185 MG/DL — HIGH (ref 70–99)
GLUCOSE BLDC GLUCOMTR-MCNC: 206 MG/DL — HIGH (ref 70–99)
GLUCOSE BLDC GLUCOMTR-MCNC: 26 MG/DL — CRITICAL LOW (ref 70–99)
GLUCOSE BLDC GLUCOMTR-MCNC: 27 MG/DL — CRITICAL LOW (ref 70–99)
GLUCOSE BLDC GLUCOMTR-MCNC: 311 MG/DL — HIGH (ref 70–99)
GLUCOSE BLDC GLUCOMTR-MCNC: 92 MG/DL — SIGNIFICANT CHANGE UP (ref 70–99)
GLUCOSE BLDC GLUCOMTR-MCNC: 94 MG/DL — SIGNIFICANT CHANGE UP (ref 70–99)
GLUCOSE SERPL-MCNC: 21 MG/DL — CRITICAL LOW (ref 70–99)
GLUCOSE UR QL: NEGATIVE MG/DL — SIGNIFICANT CHANGE UP
HCT VFR BLD CALC: 26.3 % — LOW (ref 39–50)
HGB BLD-MCNC: 8.5 G/DL — LOW (ref 13–17)
IMM GRANULOCYTES NFR BLD AUTO: 5.1 % — HIGH (ref 0–0.9)
KETONES UR-MCNC: NEGATIVE MG/DL — SIGNIFICANT CHANGE UP
LDH SERPL L TO P-CCNC: 498 U/L — HIGH (ref 50–242)
LDH SERPL L TO P-CCNC: 612 U/L — HIGH (ref 50–242)
LEUKOCYTE ESTERASE UR-ACNC: NEGATIVE — SIGNIFICANT CHANGE UP
LYMPHOCYTES # BLD AUTO: 0.19 K/UL — LOW (ref 1–3.3)
LYMPHOCYTES # BLD AUTO: 1.8 % — LOW (ref 13–44)
MAGNESIUM SERPL-MCNC: 1.6 MG/DL — SIGNIFICANT CHANGE UP (ref 1.6–2.6)
MCHC RBC-ENTMCNC: 29.3 PG — SIGNIFICANT CHANGE UP (ref 27–34)
MCHC RBC-ENTMCNC: 32.3 G/DL — SIGNIFICANT CHANGE UP (ref 32–36)
MCV RBC AUTO: 90.7 FL — SIGNIFICANT CHANGE UP (ref 80–100)
MONOCYTES # BLD AUTO: 0.27 K/UL — SIGNIFICANT CHANGE UP (ref 0–0.9)
MONOCYTES NFR BLD AUTO: 2.6 % — SIGNIFICANT CHANGE UP (ref 2–14)
NEUTROPHILS # BLD AUTO: 9.29 K/UL — HIGH (ref 1.8–7.4)
NEUTROPHILS NFR BLD AUTO: 89.8 % — HIGH (ref 43–77)
NITRITE UR-MCNC: NEGATIVE — SIGNIFICANT CHANGE UP
NRBC # BLD: 0 /100 WBCS — SIGNIFICANT CHANGE UP (ref 0–0)
NRBC BLD-RTO: 0 /100 WBCS — SIGNIFICANT CHANGE UP (ref 0–0)
NT-PROBNP SERPL-SCNC: 1076 PG/ML — HIGH (ref 0–300)
PH UR: 5.5 — SIGNIFICANT CHANGE UP (ref 5–8)
PHOSPHATE SERPL-MCNC: 3.1 MG/DL — SIGNIFICANT CHANGE UP (ref 2.5–4.5)
PLATELET # BLD AUTO: 693 K/UL — HIGH (ref 150–400)
POTASSIUM SERPL-MCNC: 4.5 MMOL/L — SIGNIFICANT CHANGE UP (ref 3.5–5.3)
POTASSIUM SERPL-SCNC: 4.5 MMOL/L — SIGNIFICANT CHANGE UP (ref 3.5–5.3)
PROT SERPL-MCNC: 5.6 G/DL — LOW (ref 6–8.3)
PROT UR-MCNC: SIGNIFICANT CHANGE UP MG/DL
RBC # BLD: 2.9 M/UL — LOW (ref 4.2–5.8)
RBC # FLD: 19.4 % — HIGH (ref 10.3–14.5)
RH IG SCN BLD-IMP: POSITIVE — SIGNIFICANT CHANGE UP
RSV RNA NPH QL NAA+NON-PROBE: SIGNIFICANT CHANGE UP
SARS-COV-2 RNA SPEC QL NAA+PROBE: SIGNIFICANT CHANGE UP
SODIUM SERPL-SCNC: 132 MMOL/L — LOW (ref 135–145)
SP GR SPEC: 1.02 — SIGNIFICANT CHANGE UP (ref 1–1.03)
SPECIMEN SOURCE: SIGNIFICANT CHANGE UP
SPECIMEN SOURCE: SIGNIFICANT CHANGE UP
URATE SERPL-MCNC: 5.7 MG/DL — SIGNIFICANT CHANGE UP (ref 3.4–8.8)
UROBILINOGEN FLD QL: 1 MG/DL — SIGNIFICANT CHANGE UP (ref 0.2–1)
WBC # BLD: 10.35 K/UL — SIGNIFICANT CHANGE UP (ref 3.8–10.5)
WBC # FLD AUTO: 10.35 K/UL — SIGNIFICANT CHANGE UP (ref 3.8–10.5)

## 2025-01-24 PROCEDURE — 99223 1ST HOSP IP/OBS HIGH 75: CPT

## 2025-01-24 PROCEDURE — 99233 SBSQ HOSP IP/OBS HIGH 50: CPT | Mod: FS

## 2025-01-24 RX ORDER — SULFAMETHOXAZOLE AND TRIMETHOPRIM 400; 80 MG/1; MG/1
500 TABLET ORAL EVERY 8 HOURS
Refills: 0 | Status: DISCONTINUED | OUTPATIENT
Start: 2025-01-24 | End: 2025-01-24

## 2025-01-24 RX ORDER — PREDNISONE 5 MG/1
40 TABLET ORAL
Refills: 0 | Status: COMPLETED | OUTPATIENT
Start: 2025-01-24 | End: 2025-01-28

## 2025-01-24 RX ORDER — VANCOMYCIN HYDROCHLORIDE 50 MG/ML
1000 KIT ORAL EVERY 12 HOURS
Refills: 0 | Status: DISCONTINUED | OUTPATIENT
Start: 2025-01-24 | End: 2025-01-24

## 2025-01-24 RX ORDER — MEROPENEM 500 MG/20ML
1000 INJECTION INTRAVENOUS EVERY 8 HOURS
Refills: 0 | Status: DISCONTINUED | OUTPATIENT
Start: 2025-01-24 | End: 2025-01-26

## 2025-01-24 RX ORDER — SULFAMETHOXAZOLE AND TRIMETHOPRIM 400; 80 MG/1; MG/1
320 TABLET ORAL EVERY 8 HOURS
Refills: 0 | Status: DISCONTINUED | OUTPATIENT
Start: 2025-01-24 | End: 2025-01-31

## 2025-01-24 RX ORDER — VANCOMYCIN HYDROCHLORIDE 50 MG/ML
1000 KIT ORAL ONCE
Refills: 0 | Status: COMPLETED | OUTPATIENT
Start: 2025-01-24 | End: 2025-01-24

## 2025-01-24 RX ORDER — MAGNESIUM SULFATE 0.8 MEQ/ML
2 AMPUL (ML) INJECTION ONCE
Refills: 0 | Status: COMPLETED | OUTPATIENT
Start: 2025-01-24 | End: 2025-01-24

## 2025-01-24 RX ORDER — INSULIN GLARGINE-YFGN 100 [IU]/ML
24 INJECTION, SOLUTION SUBCUTANEOUS AT BEDTIME
Refills: 0 | Status: DISCONTINUED | OUTPATIENT
Start: 2025-01-24 | End: 2025-01-26

## 2025-01-24 RX ORDER — VANCOMYCIN HYDROCHLORIDE 50 MG/ML
KIT ORAL
Refills: 0 | Status: DISCONTINUED | OUTPATIENT
Start: 2025-01-24 | End: 2025-01-24

## 2025-01-24 RX ORDER — DM/PSEUDOEPHED/ACETAMINOPHEN 10-30-250
25 CAPSULE ORAL ONCE
Refills: 0 | Status: COMPLETED | OUTPATIENT
Start: 2025-01-24 | End: 2025-01-24

## 2025-01-24 RX ADMIN — Medication 10 GRAM(S): at 23:57

## 2025-01-24 RX ADMIN — ROSUVASTATIN CALCIUM 10 MILLIGRAM(S): 10 TABLET, FILM COATED ORAL at 21:56

## 2025-01-24 RX ADMIN — ENOXAPARIN SODIUM 40 MILLIGRAM(S): 100 INJECTION SUBCUTANEOUS at 22:08

## 2025-01-24 RX ADMIN — MUPIROCIN 1 APPLICATION(S): 2 CREAM TOPICAL at 05:55

## 2025-01-24 RX ADMIN — Medication 10 MILLIGRAM(S): at 05:54

## 2025-01-24 RX ADMIN — PREDNISONE 40 MILLIGRAM(S): 5 TABLET ORAL at 11:12

## 2025-01-24 RX ADMIN — Medication 2 TABLET(S): at 21:56

## 2025-01-24 RX ADMIN — CLONIDINE HYDROCHLORIDE 0.1 MILLIGRAM(S): 0.2 TABLET ORAL at 14:00

## 2025-01-24 RX ADMIN — VANCOMYCIN HYDROCHLORIDE 250 MILLIGRAM(S): KIT at 00:39

## 2025-01-24 RX ADMIN — CLONIDINE HYDROCHLORIDE 0.1 MILLIGRAM(S): 0.2 TABLET ORAL at 05:54

## 2025-01-24 RX ADMIN — Medication 10 GRAM(S): at 17:24

## 2025-01-24 RX ADMIN — Medication 8 UNIT(S): at 17:23

## 2025-01-24 RX ADMIN — SULFAMETHOXAZOLE AND TRIMETHOPRIM 280 MILLIGRAM(S): 400; 80 TABLET ORAL at 23:11

## 2025-01-24 RX ADMIN — PREDNISONE 40 MILLIGRAM(S): 5 TABLET ORAL at 17:23

## 2025-01-24 RX ADMIN — Medication 10 MILLIGRAM(S): at 05:55

## 2025-01-24 RX ADMIN — ACETAMINOPHEN 650 MILLIGRAM(S): 160 SUSPENSION ORAL at 06:18

## 2025-01-24 RX ADMIN — CLONIDINE HYDROCHLORIDE 0.1 MILLIGRAM(S): 0.2 TABLET ORAL at 21:56

## 2025-01-24 RX ADMIN — Medication 10 GRAM(S): at 11:12

## 2025-01-24 RX ADMIN — Medication 100 MILLIGRAM(S): at 05:54

## 2025-01-24 RX ADMIN — Medication 25 GRAM(S): at 08:42

## 2025-01-24 RX ADMIN — SULFAMETHOXAZOLE AND TRIMETHOPRIM 180 MILLIGRAM(S): 400; 80 TABLET ORAL at 11:10

## 2025-01-24 RX ADMIN — BUPROPION HYDROCHLORIDE 300 MILLIGRAM(S): 150 TABLET, EXTENDED RELEASE ORAL at 11:12

## 2025-01-24 RX ADMIN — MEROPENEM 100 MILLIGRAM(S): 500 INJECTION INTRAVENOUS at 21:55

## 2025-01-24 RX ADMIN — ESCITALOPRAM 10 MILLIGRAM(S): 10 TABLET, FILM COATED ORAL at 11:11

## 2025-01-24 RX ADMIN — Medication 15 GRAM(S): at 22:08

## 2025-01-24 RX ADMIN — LEVOTHYROXINE SODIUM 88 MICROGRAM(S): 25 TABLET ORAL at 05:55

## 2025-01-24 RX ADMIN — Medication 100 MILLIGRAM(S): at 22:09

## 2025-01-24 RX ADMIN — POLYETHYLENE GLYCOL 3350 17 GRAM(S): 17 POWDER, FOR SOLUTION ORAL at 21:55

## 2025-01-24 RX ADMIN — INSULIN GLARGINE-YFGN 24 UNIT(S): 100 INJECTION, SOLUTION SUBCUTANEOUS at 21:55

## 2025-01-24 RX ADMIN — Medication 8 UNIT(S): at 13:01

## 2025-01-24 RX ADMIN — Medication 25 GRAM(S): at 21:54

## 2025-01-24 RX ADMIN — Medication 4: at 21:56

## 2025-01-24 RX ADMIN — Medication 4: at 12:59

## 2025-01-24 RX ADMIN — Medication 12.5 MILLIGRAM(S): at 05:55

## 2025-01-24 RX ADMIN — ACETAMINOPHEN 1000 MILLIGRAM(S): 160 SUSPENSION ORAL at 00:34

## 2025-01-24 RX ADMIN — ACETAMINOPHEN 650 MILLIGRAM(S): 160 SUSPENSION ORAL at 06:53

## 2025-01-24 RX ADMIN — TACROLIMUS 1 MILLIGRAM(S): 1 CAPSULE, GELATIN COATED ORAL at 09:55

## 2025-01-24 RX ADMIN — ANTISEPTIC SURGICAL SCRUB 1 APPLICATION(S): 0.04 SOLUTION TOPICAL at 11:19

## 2025-01-24 RX ADMIN — MUPIROCIN 1 APPLICATION(S): 2 CREAM TOPICAL at 17:22

## 2025-01-24 RX ADMIN — PANTOPRAZOLE 40 MILLIGRAM(S): 20 TABLET, DELAYED RELEASE ORAL at 11:12

## 2025-01-24 RX ADMIN — TACROLIMUS 1 MILLIGRAM(S): 1 CAPSULE, GELATIN COATED ORAL at 21:56

## 2025-01-24 RX ADMIN — MEROPENEM 100 MILLIGRAM(S): 500 INJECTION INTRAVENOUS at 14:00

## 2025-01-24 RX ADMIN — Medication 100 MILLIGRAM(S): at 13:59

## 2025-01-24 RX ADMIN — Medication 2: at 17:22

## 2025-01-24 NOTE — CONSULT NOTE ADULT - ATTENDING COMMENTS
69M with PMHx of renal transplant 2012 (2/2 DM, s/p left nephrectomy), peripheral neuropathy, hypothyroidism, retroperitoneal fibrosis, HTN, HLD, GERD, anxiety/depression, ANGELIKA and recent admission at [facility] for sacral osteomyelitis and ESBL Coli urosepsis discharged on 12/18/24 to rehab, admitted to [facility] with hyperCa+ and liver masses s/p bx showing DLBCL, s/p RminiCHOP x 2, now presenting with fevers and shortness of breath, admitted for acute hypoxemic respiratory failure secondary to coronavirus, now with worsening hypoxemia and CT showing progression of GGO. Pulmonary consulted for bronchoscopic evaluaiton.    #Acute hypoxemic respiratory failure  #Abnormal CT chest with GGO  #Immunocompromised patient s/p chemo  #Chronic steroid use   - patient with worsening hypoxemic respiratory failure and CT concerning for worsening GGO (although significant motion artifact)  - ddx includes pulmonary edema vs PCP PNA vs viral PNA vs atypical PNA  - patient has been on steroids (>20mg/day) for >1 month without PCP ppx- PCP PNA can also present with bilateral GGO (although effusions less likely)  - on tx for PCP- c/w bactrim and prednisone 40mg BID for now  - please check CMV PCR, sputum PCP PCR, sputum culture and urine legionella, check HSV  - follow up fungitell and galactomannan  - consider azithromycin x 5 day to cover atypical infection  - recommend IV diuresis (can start with 20-40mg of IV lasix) and assess response given GGO with pleural effusions; check TTE  - discussed with patient and family bronchoscopic evaluation- as patient is on 100% FiO2, would likely not be able to come off the ventilator right after bronchoscopy- at this time, would prefer non invasive work up 69M with PMHx of renal transplant 2012 (2/2 DM, s/p left nephrectomy), peripheral neuropathy, hypothyroidism, retroperitoneal fibrosis, HTN, HLD, GERD, anxiety/depression, ANGELIKA and recent admission at [facility] for sacral osteomyelitis and ESBL Coli urosepsis discharged on 12/18/24 to rehab, admitted to [facility] with hyperCa+ and liver masses s/p bx showing DLBCL, s/p RminiCHOP x 2, now presenting with fevers and shortness of breath, admitted for acute hypoxemic respiratory failure secondary to coronavirus, now with worsening hypoxemia and CT showing progression of GGO. Pulmonary consulted for bronchoscopic evaluaiton.    #Acute hypoxemic respiratory failure  #Abnormal CT chest with GGO  #Immunocompromised patient s/p chemo and renal transplant  #Chronic steroid use   - patient with worsening hypoxemic respiratory failure and CT concerning for worsening GGO (although significant motion artifact)  - ddx includes pulmonary edema vs PCP PNA vs viral PNA vs atypical PNA  - patient has been on steroids (>20mg/day) for >1 month without PCP ppx- PCP PNA can also present with bilateral GGO (although effusions less likely)  - on tx for PCP- c/w bactrim and prednisone 40mg BID for now  - please check CMV PCR, sputum PCP PCR, sputum culture and urine legionella, check HSV  - follow up fungitell and galactomannan  - consider azithromycin x 5 day to cover atypical infection  - recommend IV diuresis (can start with 20-40mg of IV lasix) and assess response given GGO with pleural effusions; check TTE  - discussed with patient and family bronchoscopic evaluation- as patient is on 100% FiO2, would likely not be able to come off the ventilator right after bronchoscopy- at this time, would prefer non invasive work up

## 2025-01-24 NOTE — PHARMACOTHERAPY INTERVENTION NOTE - COMMENTS
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    Recommendation(s):  Based on this patient's weight, would suggest ~12.5 mg/kg/day (recent, limited literature suggests we can use lower doses from 10-12 mg/kg/day for PJP pneumonia treatment with similar efficacy to higher doses). The dose comes out to 320 mg (based on TMP component) IV Q8H.    With kind regards,  Boyd Upton, PharmD, [facility]  Infectious Diseases Clinical Pharmacist  Available on Microsoft Teams  .

## 2025-01-24 NOTE — PROGRESS NOTE ADULT - NS ATTEND AMEND GEN_ALL_CORE FT
.  Primary: [place]    Vital Signs Last 24 Hrs  T(C): 38.2 (24 Jan 2025 05:52), Max: 38.8 (23 Jan 2025 16:52)  T(F): 100.7 (24 Jan 2025 05:52), Max: 101.8 (23 Jan 2025 16:52)  HR: 80 (24 Jan 2025 05:52) (71 - 100)  BP: 145/80 (24 Jan 2025 05:52) (124/68 - 175/74)  BP(mean): --  RR: 19 (24 Jan 2025 05:52) (18 - 20)  SpO2: 93% (24 Jan 2025 05:52) (81% - 97%)    Parameters below as of 24 Jan 2025 05:52  Patient On (Oxygen Delivery Method): nasal cannula, high flow  O2 Flow (L/min): 60  O2 Concentration (%): 100    MEDICATIONS  (STANDING):  alteplase for catheter clearance 2 milliGRAM(s) Catheter once  alteplase for catheter clearance 2 milliGRAM(s) Catheter once  amLODIPine   Tablet 10 milliGRAM(s) Oral daily  buPROPion XL (24-Hour) . 300 milliGRAM(s) Oral daily  carvedilol 12.5 milliGRAM(s) Oral every 12 hours  chlorhexidine 4% Liquid 1 Application(s) Topical <User Schedule>  cloNIDine 0.1 milliGRAM(s) Oral three times a day  dextrose 5%. 1000 milliLiter(s) (50 mL/Hr) IV Continuous <Continuous>  dextrose 5%. 1000 milliLiter(s) (100 mL/Hr) IV Continuous <Continuous>  dextrose 50% Injectable 25 Gram(s) IV Push once  dextrose 50% Injectable 12.5 Gram(s) IV Push once  dextrose 50% Injectable 25 Gram(s) IV Push once  enoxaparin Injectable 40 milliGRAM(s) SubCutaneous <User Schedule>  ertapenem  IVPB      ertapenem  IVPB 1000 milliGRAM(s) IV Intermittent every 24 hours  escitalopram 10 milliGRAM(s) Oral daily  glucagon  Injectable 1 milliGRAM(s) IntraMuscular once  glucagon  Injectable 1 milliGRAM(s) IntraMuscular once  hydrALAZINE 100 milliGRAM(s) Oral every 8 hours  insulin glargine Injectable (LANTUS) 38 Unit(s) SubCutaneous at bedtime  insulin lispro (ADMELOG) corrective regimen sliding scale   SubCutaneous three times a day before meals  insulin lispro (ADMELOG) corrective regimen sliding scale   SubCutaneous at bedtime  insulin lispro Injectable (ADMELOG) 8 Unit(s) SubCutaneous before breakfast  insulin lispro Injectable (ADMELOG) 8 Unit(s) SubCutaneous before lunch  insulin lispro Injectable (ADMELOG) 8 Unit(s) SubCutaneous before dinner  lactulose Syrup 15 Gram(s) Oral <User Schedule>  lactulose Syrup 10 Gram(s) Oral every 6 hours  levothyroxine 88 MICROGram(s) Oral daily  lisinopril 10 milliGRAM(s) Oral daily  mupirocin 2% Nasal 1 Application(s) Both Nostrils two times a day  pantoprazole   Suspension 40 milliGRAM(s) Oral daily  polyethylene glycol 3350 17 Gram(s) Oral at bedtime  rosuvastatin 10 milliGRAM(s) Oral at bedtime  senna 2 Tablet(s) Oral at bedtime  tacrolimus 1 milliGRAM(s) Oral <User Schedule>  tacrolimus 1 milliGRAM(s) Oral at bedtime  vancomycin  IVPB      vancomycin  IVPB 1000 milliGRAM(s) IV Intermittent every 12 hours      wife (Ludmila), daughter (Luz Marina) and son (Jose J).    Assessment: [facility] for day 1 cycle 2 mini [facility](O)P for PTLD but suffered from urosepsis with Klebsiella ESBL (Zosyn sensitive) and nasal corona virus. Course further complicated by sustained high fevers (alyson's suspected -however, based on CT higher concern for PCP    Cystatin C eGFR: 40; standard eGFR calculation: 96    PMHx::  1) renal transplant 2012: left nephrectomy; renal transplant was secondary to DM, 2) AODM, 3) HLD, 4) hypothyroidism, 5) peripheral neuropathy  6) retroperitoneal fibrosis, 7) GERD, 8) HTN, 9) anxiety/depression, 10) obstructive sleep apnea, 11) osteomyelitis and E coli urosepsis (12/1/24 - 12/18/24)    Plan:  Hold planned chemotherapy. given toxic presentation and sustained high fevers unlikely to continue anthracycline-based treatment.   And hold planned Tafa/REV -      ID:  ertapenem (1/20/25 - ),  galactomannan  and B-D glucan an LDH are pending - for PCP evaluation.  I have also contacted interventional pulmonary medicine for potential bronch. Will discuss with high epimeric PCP coverage (need to evaluate bactrim as a renal toxin)   zosyn (1/17/25 - 1/20/25)    Endo: as clinically well and fevers resolved: continue day +2 hydrocortisone 25mg IV q8 x 24 hours - likely will change to PCP dosing.     Radiographs: Last chest/abd pelvis: 1/17/25, pelvic radiographs: pending; may also require MRI for avascular necrosis   Chest (1/23/25): concerning for PCP (personal read)     Renal: continue /pred -- pred 5mg bid (50% dose reduction) 1/18/25  AT RISK FOR ADRENAL INSUFFIENCEY IF HYPOTENSIVE 50mg hydrocortisone IV q8     PM&R consulted: for inability to weight bare.    Social: need to discuss treatment with Tafa/Rev.  His current rehab facility will not allow for transport to outpatient oncology to treat his lymphoma.      Over 60 minutes were spent in direct patient care and care coordination.

## 2025-01-24 NOTE — PROGRESS NOTE ADULT - NUTRITIONAL ASSESSMENT
This patient has been assessed with a concern for Malnutrition and has been determined to have a diagnosis/diagnoses of Moderate protein-calorie malnutrition.    This patient is being managed with:   Diet Consistent Carbohydrate/No Snacks-  Supplement Feeding Modality:  Oral  Glucerna Shake Cans or Servings Per Day:  1       Frequency:  Daily  Entered: Jan 23 2025  5:35PM

## 2025-01-24 NOTE — PROGRESS NOTE ADULT - PROBLEM SELECTOR PLAN 2
Not neutropenic. febrile   1/17 - F/U CT C/A/P- Extensive new bilateral ground glass nodular opacities, upper lobe predominant. The etiology is unclear, question pneumonia. Small right and trace left pleural effusion. Right hepatic mass is not well evaluated but appears decreased in size  since 12/9/2024. Known splenic mass is poorly seen without contrast.  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  1/17- F/u Flu/COVID PCR , + for Coronavirus   (last hospitalization: osteomyelitis and E coli urosepsis (12/1/24 - 12/18/24)   [facility] (12/18/24): for osteomyelitis & E coli urosepsis).  D/C'd on 1/20  Zosyn O/N  1/18 - Oral candidiasis - Nystatin S/S  1/19- ID consult, followed by [facility] ID, Zosyn changed to Ertapenem  history of ESBL Kleb in Ucx 12/31/24, ESBL E.coli 11/29/24 Ucx  1/24 - CT chest with c/f new PCP pneumonia as well as unchanged sacral OM. ID recs DC IV vanc, switch IV erta to IV mick given hypoalbuminemia efficacy concerns and start IV bactrim for empiric PCP PNA treatment as well as steroid taper pred 40mg BID x5 days followed by pred 40mg QD x5d then 20mg QD.   - Interventional pulm consulted for bronch Not neutropenic. febrile   1/17- F/u Flu/COVID PCR , + for Coronavirus   (last hospitalization: osteomyelitis and E coli urosepsis (12/1/24 - 12/18/24)   [facility] (12/18/24): for osteomyelitis & E coli urosepsis).  1/18 - Oral candidiasis - Nystatin S/S  1/19- ID consult, followed by [facility] ID, Zosyn changed to Ertapenem  history of ESBL Kleb in Ucx 12/31/24, ESBL E.coli 11/29/24 Ucx  1/24 - CT chest with c/f new PCP pneumonia as well as unchanged sacral OM. ID recs DC IV vanc, switch IV erta to IV mick given hypoalbuminemia efficacy concerns and start IV bactrim for empiric PCP PNA treatment as well as steroid taper pred 40mg BID x5 days followed by pred 40mg QD x5d then 20mg QD.   - Interventional pulm consulted for bronch  [ ] f/u fungitell, galactomannan, sputum PCP PCR

## 2025-01-24 NOTE — ADVANCED PRACTICE NURSE CONSULT - ASSESSMENT
rec GI eval. If eval is negative then rec ENT eval.   Arrived on unit, patient was found lying in a low air loss pressure redistribution support surface style bed.  Mr. Gann able to turn with some assistance. Once turned, incontinence of stool was apparent and julio care was provided and, able to view his skin.  Patient with a condom catheter for urinary diversion.    Bilateral sacrum/buttock non-blanchable deep red / purple discoloration, consistent with an evolving deep tissue injury, size approximately 10.0 cm x 9.0 cm x 0.1 cm, present on admission, with an open wound measuring approximately 2.0 cm x 1.5 cm x 0.1 cm. Cleansed w/ incontinent cleanser, pat dry & applied Triad at bedside.     Bilateral heels with blanchable erythema and intact skin. Cannot rule out DTI POA.       Patient, and RN were educated on the importance of turning and positioning every 2 hours. The use of waffle cushion when out of bed to chair, and to shift his weight every 2 hours while in chair. The importance of keeping his skin clean and dry and to offload feet/heels, and optimal nutrition.       When the consultation was completed, the patient was left in a right sided position, with side rails up, call bell within reach, and bed in lowest position. Discussed plan of care with Jose Juan (NURA).

## 2025-01-24 NOTE — PROGRESS NOTE ADULT - PROBLEM SELECTOR PLAN 3
Monitor FS AC/HS  Sliding scale Humalog AC/HS.  1/24 hypoglycemic to FS 20s off steroids-->adding steroids for PJP PNA, will also lower lantus 38-->24U QHS and continue 8U premeal admelog

## 2025-01-24 NOTE — PHARMACOTHERAPY INTERVENTION NOTE - COMMENTS
Clinical Pharmacy Specialist- Hematology/Oncology- Progress Note    Pt is a 70 y/o male with PMH of renal transplant (2012 left nephrectomy), peripheral neuropathy, hypothyroidism, retroperitoneal fibrosis, HTN, HLD, GERD, anxiety/depression, ANGELIKA, and newly diagnosed DLBCL, s/p R mini CHOP x 1 cycle. Was due to be admitted for Cycle 2, but course complicated by infection, now planning to start next line of therapy with Tafasitamab + Lenalidomide    Antimicrobial Course (ID Following, MD Risa Ac):  - Zosyn - 1/18- 1/20  --> Ertapenem (per ID, h/o ESBL UTI 12/2024)- 1/20  MRSA nasal swab    Last Neutropenic (ANC<1000): no occurrence  Last Febrile: 1/24@05:52 T= 100.7  Days Non-Neutropenic: 7  Days afebrile: 0    Chemotherapy Course  -Current Regimen: Tafasitamab + Lenalidomide - (tentative)  History:  (12/28/24)- C1 mini-HOP  (1/24/25)- C1 Tafasitamab + Lenalidomide - (tentative)  - Tafasitamab 12mg/kg D1,4,8,15,22   - Lenalidomide 10mg PO D1-14  ---------------------------------------------------  -C2&3- Tafasitamab 12mg/kg D1,8,15,22   -C4 onward- Tafasitamab 12mg/kg D1,15   -Day: 28 of [facility] (1/24)  BmBx:  Access:     History/Relevant clinical information used in assessment:      Assessment/Plan/Recommendation:  - adrenal insufficiency- hydrocortisone 25mg q8hr tid x 3 doses given yest 1/22- back to prednisone 5 bid today?- cont hydrocortisone 25mg IVP tid, convert to PO when ready; (equiv to pred 19mg, dex 2.8mg, methylpred 15mg PO)  - plan today for tafasitamab + lenalidomide 10mg D1-14 -pending discussion per pt with family- has not completed REMS pt portion for lenalidomide  - for lenalidomide- pt will need AC- lovenox ok; can d/c on aspirin 81mg daily     Tafasitamab dosing schema:  -C1-Tafasitamab 12mg/kg D1,4,8,15,22   -C2&3- Tafasitamab 12mg/kg D1,8,15,22   -C4 onward- Tafasitamab 12mg/kg D1,15   ---------------------------------------------------------  CrCl vs Cystatin-C EGFR (1/17/25)  - CrCl= 95ml/min (scr= 0.81  - Cystatin-C EGFR= 40ml/min (Cystatin-C= 1.63)  - 58% dose difference    Additional Monitoring Needed?   -Yes- Continue to monitor renal function & daily counts for abx escalation/de-escalation   -Discharge Planning:  --> New meds:  --> Meds sent for auth:  --> Delivered meds:    Case discussed with attending/primary team    Gisella Cuba, PharmD, BCPS  Clinical Pharmacy Specialist | Hematology/Oncology  [facility]  Email: [email or web address] or available on [email or web address] Clinical Pharmacy Specialist- Hematology/Oncology- Progress Note    Pt is a 70 y/o male with PMH of renal transplant (2012 left nephrectomy), peripheral neuropathy, hypothyroidism, retroperitoneal fibrosis, HTN, HLD, GERD, anxiety/depression, ANGELIKA, and newly diagnosed DLBCL, s/p R mini CHOP x 1 cycle. Was due to be admitted for Cycle 2, but course complicated by possible PCP PNA    Antimicrobial Course (ID Following, MD Risa Ac):  - Zosyn - 1/18- 1/20  --> Ertapenem (per ID, h/o ESBL UTI 12/2024)- 1/20- 1/23  --> Meropenem (less protein bound vs erta)- 1/24  - Bactrim IV (CT- susp PCP)- 1/24  - Vancomycin- 1/24 x 1  MRSA nasal swab    Last Neutropenic (ANC<1000): no occurrence  Last Febrile: 1/24@05:52 T= 100.7  Days Non-Neutropenic: 7  Days afebrile: 0    Chemotherapy Course  -Current Regimen: R-mini-RCHOP  History:  (12/28/24)- C1 mini-RCHOP  -Day: 28 (1/24)  BmBx:  Access:     History/Relevant clinical information used in assessment:  - 1/23- adrenal insufficiency- hydrocortisone 25mg q8hr tid x 3 doses given yest 1/22- cont hydrocortisone 25mg IVP tid    Assessment/Plan/Recommendation:  - CT 1/23- possible PCP PNA- worsened from 1/17-  ·	started Bactrim IV 320mg q8hr (12.5mg/kg- using range of 10-12mg/kg which is acceptable for PCP showing similar efficacy as 15mg/kg; given pt's kidney transplant status, this will mitigate renal risk  ·	Per ID- With hypoxia and high suspicion for PCP, does meet criteria for steroids  ·	Steroid dose would be: prednisone 40mg PO BID x5d then 40mg daily x5d followed by 20mg daily for 11 days     - plan was for tafasitamab + lenalidomide 10mg D1-14 -pending discussion per pt with family- has not completed REMS pt portion for lenalidomide- on HOLD  - for lenalidomide- pt will need AC- lovenox ok; can d/c on aspirin 81mg daily     Tafasitamab dosing schema:  -C1-Tafasitamab 12mg/kg D1,4,8,15,22   -C2&3- Tafasitamab 12mg/kg D1,8,15,22   -C4 onward- Tafasitamab 12mg/kg D1,15   ---------------------------------------------------------  CrCl vs Cystatin-C EGFR (1/17/25)  - CrCl= 95ml/min (scr= 0.81  - Cystatin-C EGFR= 40ml/min (Cystatin-C= 1.63)  - 58% dose difference    Additional Monitoring Needed?   -Yes- Continue to monitor renal function & daily counts for abx escalation/de-escalation   -Discharge Planning:  --> New meds:  --> Meds sent for auth:  --> Delivered meds:    Case discussed with attending/primary team    Gisella Cuba, PharmD, BCPS  Clinical Pharmacy Specialist | Hematology/Oncology  [facility]  Email: [email or web address] or available on [email or web address]

## 2025-01-24 NOTE — PROGRESS NOTE ADULT - SUBJECTIVE AND OBJECTIVE BOX
Diagnosis PTLD    Protocol/Chemo Regimen: None  Day: N/A    24hr: CT chest c/f PJP PNA. Hypoglycemic in AM. Discussed with pt holding off therapy now in favor of bronch/infectious workup and antibiotics.                            Allergies    No Known Allergies    Intolerances        ANTIMICROBIALS  meropenem  IVPB 1000 milliGRAM(s) IV Intermittent every 8 hours  trimethoprim / sulfamethoxazole IVPB 320 milliGRAM(s) IV Intermittent every 8 hours  vancomycin  IVPB      vancomycin  IVPB 1000 milliGRAM(s) IV Intermittent every 12 hours      HEME/ONC MEDICATIONS  alteplase for catheter clearance 2 milliGRAM(s) Catheter once  alteplase for catheter clearance 2 milliGRAM(s) Catheter once  enoxaparin Injectable 40 milliGRAM(s) SubCutaneous <User Schedule>      STANDING MEDICATIONS  amLODIPine   Tablet 10 milliGRAM(s) Oral daily  buPROPion XL (24-Hour) . 300 milliGRAM(s) Oral daily  carvedilol 12.5 milliGRAM(s) Oral every 12 hours  chlorhexidine 4% Liquid 1 Application(s) Topical <User Schedule>  cloNIDine 0.1 milliGRAM(s) Oral three times a day  dextrose 5%. 1000 milliLiter(s) IV Continuous <Continuous>  dextrose 5%. 1000 milliLiter(s) IV Continuous <Continuous>  dextrose 50% Injectable 25 Gram(s) IV Push once  dextrose 50% Injectable 12.5 Gram(s) IV Push once  dextrose 50% Injectable 25 Gram(s) IV Push once  escitalopram 10 milliGRAM(s) Oral daily  glucagon  Injectable 1 milliGRAM(s) IntraMuscular once  glucagon  Injectable 1 milliGRAM(s) IntraMuscular once  hydrALAZINE 100 milliGRAM(s) Oral every 8 hours  insulin glargine Injectable (LANTUS) 24 Unit(s) SubCutaneous at bedtime  insulin lispro (ADMELOG) corrective regimen sliding scale   SubCutaneous three times a day before meals  insulin lispro (ADMELOG) corrective regimen sliding scale   SubCutaneous at bedtime  insulin lispro Injectable (ADMELOG) 8 Unit(s) SubCutaneous before breakfast  insulin lispro Injectable (ADMELOG) 8 Unit(s) SubCutaneous before lunch  insulin lispro Injectable (ADMELOG) 8 Unit(s) SubCutaneous before dinner  lactulose Syrup 15 Gram(s) Oral <User Schedule>  lactulose Syrup 10 Gram(s) Oral every 6 hours  levothyroxine 88 MICROGram(s) Oral daily  lisinopril 10 milliGRAM(s) Oral daily  mupirocin 2% Nasal 1 Application(s) Both Nostrils two times a day  pantoprazole   Suspension 40 milliGRAM(s) Oral daily  polyethylene glycol 3350 17 Gram(s) Oral at bedtime  predniSONE   Tablet 40 milliGRAM(s) Oral two times a day  rosuvastatin 10 milliGRAM(s) Oral at bedtime  senna 2 Tablet(s) Oral at bedtime  tacrolimus 1 milliGRAM(s) Oral <User Schedule>  tacrolimus 1 milliGRAM(s) Oral at bedtime      PRN MEDICATIONS  acetaminophen     Tablet .. 650 milliGRAM(s) Oral every 6 hours PRN  dextrose Oral Gel 15 Gram(s) Oral once PRN  polyethylene glycol 3350 17 Gram(s) Oral daily PRN  sodium chloride 0.9% lock flush 10 milliLiter(s) IV Push every 1 hour PRN        Vital Signs Last 24 Hrs  T(C): 36.6 (24 Jan 2025 09:39), Max: 38.8 (23 Jan 2025 16:52)  T(F): 97.9 (24 Jan 2025 09:39), Max: 101.8 (23 Jan 2025 16:52)  HR: 72 (24 Jan 2025 09:39) (72 - 100)  BP: 104/63 (24 Jan 2025 09:39) (104/63 - 175/74)  BP(mean): --  RR: 18 (24 Jan 2025 09:39) (18 - 20)  SpO2: 89% (24 Jan 2025 09:39) (81% - 97%)    Parameters below as of 24 Jan 2025 09:39  Patient On (Oxygen Delivery Method): nasal cannula, high flow  O2 Flow (L/min): 60  O2 Concentration (%): 100    PHYSICAL EXAM  General: adult in NAD  HEENT: clear oropharynx, anicteric sclera, pink conjunctiva  Neck: supple  CV: normal S1/S2 RRR  Lungs: positive air movement on HFNC  Abdomen: soft non-tender non-distended, (+) BS  Ext: no edema  Skin: no rashes and no petechiae  Neuro: alert and oriented X 3, no focal deficits  Central Line: normal    LABS:    Blood Cultures:                           8.5    10.35 )-----------( 693      ( 24 Jan 2025 07:30 )             26.3         Mean Cell Volume : 90.7 fl  Mean Cell Hemoglobin : 29.3 pg  Mean Cell Hemoglobin Concentration : 32.3 g/dL  Auto Neutrophil # : 9.29 K/uL  Auto Lymphocyte # : 0.19 K/uL  Auto Monocyte # : 0.27 K/uL  Auto Eosinophil # : 0.02 K/uL  Auto Basophil # : 0.05 K/uL  Auto Neutrophil % : 89.8 %  Auto Lymphocyte % : 1.8 %  Auto Monocyte % : 2.6 %  Auto Eosinophil % : 0.2 %  Auto Basophil % : 0.5 %      01-24    132[L]  |  97  |  26[H]  ----------------------------<  21[LL]  4.5   |  22  |  0.93    Ca    9.1      24 Jan 2025 07:23  Phos  3.1     01-24  Mg     1.6     01-24    TPro  5.6[L]  /  Alb  2.3[L]  /  TBili  0.3  /  DBili  x   /  AST  64[H]  /  ALT  43  /  AlkPhos  96  01-24      Mg 1.6  Phos 3.1            Uric Acid 5.7      Uric Acid --        RADIOLOGY & ADDITIONAL STUDIES:    < from: CT Abdomen and Pelvis No Cont (01.23.25 @ 17:07) >  Diffuse bilateral patchy groundglass opacities with central predominance,   increased from 1/17/2025, may represent edema or pneumonia.  Unchanged left lower lobe round atelectasis.  Small left pleural effusion.  Unchanged retroperitoneal soft tissue encasing the aorta and IVC.  Diffuse erosive changes of the sacrum with soft tissue infiltration,   similar in appearance to prior, which could represent osteomyelitis.    < end of copied text >

## 2025-01-24 NOTE — ADVANCED PRACTICE NURSE CONSULT - RECOMMEDATIONS
Impression   fecal incontinence  urinary incontinence  bilateral sacrum/ buttock deep tissue injury with evolution, present on admission.  B/L Heels blanchable erythema and intact skin    Recommendations   1. Bilateral sacrum/buttock   Topical therapy- sacral/bilateral buttocks- cleanse w/incontinent cleanser, pat dry & apply Triad  twice daily & prn soiling . Monitor for changes .    2. Right and left heel -Elevate heels; ensure that the soles of the feet are not resting on the foot board of the bed.    3  Incontinent management - incontinent cleanser, pads,  julio care  BID    4. Maintain on an alternating air with low air loss surface     5. Turn & reposition every 2 hr; Use positioning pillow to turn and reposition, soft pillow between bony prominences; continue measures to decrease friction/shear/pressure.    6. Nutrition optimization.    7.  Place waffle cushion when out of bed to chair

## 2025-01-24 NOTE — CONSULT NOTE ADULT - SUBJECTIVE AND OBJECTIVE BOX
PATIENT:  JAN CAVLIN  [id number]    CHIEF COMPLAINT:  Patient is a 69y old  Male who presents with a chief complaint of "For chemo" (24 Jan 2025 08:35)    INTERVAL HISTORY/OVERNIGHT EVENTS:  68 y/o M PMHx renal transplant 2012 (2/2 DM, s/p left nephrectomy), peripheral neuropathy, hypothyroidism, retroperitoneal fibrosis, HTN, HLD, GERD, anxiety/depression, ANGELIKA and recent admission at [facility] for sacral osteomyelitis and ESBL.coli urosepsis discharged on 12/18/24 to rehab. While admitted to [facility] was noted to have hypercalcemia and liver masses which were biopsied on 12/16/24, biopsy revealed DLBCL. Patient received R mini CHOP on 12/28 now admitted for cycle #2 Rmini CHOP, upon admission patient is febrile will hold  chemotherapy.    MEDICATIONS:  MEDICATIONS  (STANDING):  alteplase for catheter clearance 2 milliGRAM(s) Catheter once  alteplase for catheter clearance 2 milliGRAM(s) Catheter once  amLODIPine   Tablet 10 milliGRAM(s) Oral daily  buPROPion XL (24-Hour) . 300 milliGRAM(s) Oral daily  carvedilol 12.5 milliGRAM(s) Oral every 12 hours  chlorhexidine 4% Liquid 1 Application(s) Topical <User Schedule>  cloNIDine 0.1 milliGRAM(s) Oral three times a day  dextrose 5%. 1000 milliLiter(s) (50 mL/Hr) IV Continuous <Continuous>  dextrose 5%. 1000 milliLiter(s) (100 mL/Hr) IV Continuous <Continuous>  dextrose 50% Injectable 25 Gram(s) IV Push once  dextrose 50% Injectable 12.5 Gram(s) IV Push once  dextrose 50% Injectable 25 Gram(s) IV Push once  enoxaparin Injectable 40 milliGRAM(s) SubCutaneous <User Schedule>  escitalopram 10 milliGRAM(s) Oral daily  glucagon  Injectable 1 milliGRAM(s) IntraMuscular once  glucagon  Injectable 1 milliGRAM(s) IntraMuscular once  hydrALAZINE 100 milliGRAM(s) Oral every 8 hours  insulin glargine Injectable (LANTUS) 24 Unit(s) SubCutaneous at bedtime  insulin lispro (ADMELOG) corrective regimen sliding scale   SubCutaneous three times a day before meals  insulin lispro (ADMELOG) corrective regimen sliding scale   SubCutaneous at bedtime  insulin lispro Injectable (ADMELOG) 8 Unit(s) SubCutaneous before breakfast  insulin lispro Injectable (ADMELOG) 8 Unit(s) SubCutaneous before lunch  insulin lispro Injectable (ADMELOG) 8 Unit(s) SubCutaneous before dinner  lactulose Syrup 15 Gram(s) Oral <User Schedule>  lactulose Syrup 10 Gram(s) Oral every 6 hours  levothyroxine 88 MICROGram(s) Oral daily  lisinopril 10 milliGRAM(s) Oral daily  meropenem  IVPB 1000 milliGRAM(s) IV Intermittent every 8 hours  mupirocin 2% Nasal 1 Application(s) Both Nostrils two times a day  pantoprazole   Suspension 40 milliGRAM(s) Oral daily  polyethylene glycol 3350 17 Gram(s) Oral at bedtime  predniSONE   Tablet 40 milliGRAM(s) Oral two times a day  rosuvastatin 10 milliGRAM(s) Oral at bedtime  senna 2 Tablet(s) Oral at bedtime  tacrolimus 1 milliGRAM(s) Oral <User Schedule>  tacrolimus 1 milliGRAM(s) Oral at bedtime  trimethoprim / sulfamethoxazole IVPB 320 milliGRAM(s) IV Intermittent every 8 hours    MEDICATIONS  (PRN):  acetaminophen     Tablet .. 650 milliGRAM(s) Oral every 6 hours PRN Temp greater or equal to 38C (100.4F), Mild Pain (1 - 3)  dextrose Oral Gel 15 Gram(s) Oral once PRN Blood Glucose LESS THAN 70 milliGRAM(s)/deciliter  polyethylene glycol 3350 17 Gram(s) Oral daily PRN for constipation  sodium chloride 0.9% lock flush 10 milliLiter(s) IV Push every 1 hour PRN Pre/post blood products, medications, blood draw, and to maintain line patency      ALLERGIES:  Allergies    No Known Allergies    Intolerances        OBJECTIVE:  ICU Vital Signs Last 24 Hrs  T(C): 36.6 (24 Jan 2025 09:39), Max: 38.8 (23 Jan 2025 16:52)  T(F): 97.9 (24 Jan 2025 09:39), Max: 101.8 (23 Jan 2025 16:52)  HR: 85 (24 Jan 2025 09:48) (72 - 100)  BP: 104/63 (24 Jan 2025 09:39) (104/63 - 175/74)  RR: 20 (24 Jan 2025 09:48) (18 - 20)  SpO2: 92% (24 Jan 2025 09:48) (81% - 97%)    O2 Parameters below as of 24 Jan 2025 09:48  Patient On (Oxygen Delivery Method): nasal cannula, high flow  O2 Flow (L/min): 60  O2 Concentration (%): 100      POCT Blood Glucose.: 206 mg/dL (24 Jan 2025 12:23)  POCT Blood Glucose.: 94 mg/dL (24 Jan 2025 09:54)  POCT Blood Glucose.: 92 mg/dL (24 Jan 2025 09:24)  POCT Blood Glucose.: 26 mg/dL (24 Jan 2025 08:37)  POCT Blood Glucose.: 27 mg/dL (24 Jan 2025 08:34)  POCT Blood Glucose.: 142 mg/dL (23 Jan 2025 21:52)  POCT Blood Glucose.: 134 mg/dL (23 Jan 2025 18:53)  POCT Blood Glucose.: 206 mg/dL (24 Jan 2025 12:23)    I&O's Summary    23 Jan 2025 07:01  -  24 Jan 2025 07:00  --------------------------------------------------------  IN: 2131 mL / OUT: 2925 mL / NET: -794 mL    24 Jan 2025 07:01  -  24 Jan 2025 13:52  --------------------------------------------------------  IN: 180 mL / OUT: 400 mL / NET: -220 mL    PHYSICAL EXAMINATION:  General: WN/WD NAD  HEENT: PERRL, EOMI, moist mucous membranes  Neurology: A&Ox3, nonfocal, GARDNER x 4  Respiratory: crackles  CV: RRR, S1S2, no murmurs, rubs or gallops  Abdominal: Soft, NT, ND +BS  Extremities: No edema, + peripheral pulses    LABS:                          8.5    10.35 )-----------( 693      ( 24 Jan 2025 07:30 )             26.3     01-24    132[L]  |  97  |  26[H]  ----------------------------<  21[LL]  4.5   |  22  |  0.93    Ca    9.1      24 Jan 2025 07:23  Phos  3.1     01-24  Mg     1.6     01-24    TPro  5.6[L]  /  Alb  2.3[L]  /  TBili  0.3  /  DBili  x   /  AST  64[H]  /  ALT  43  /  AlkPhos  96  01-24    LIVER FUNCTIONS - ( 24 Jan 2025 07:23 )  Alb: 2.3 g/dL / Pro: 5.6 g/dL / ALK PHOS: 96 U/L / ALT: 43 U/L / AST: 64 U/L / GGT: x             Urinalysis Basic - ( 24 Jan 2025 07:23 )    Color: x / Appearance: x / SG: x / pH: x  Gluc: 21 mg/dL / Ketone: x  / Bili: x / Urobili: x   Blood: x / Protein: x / Nitrite: x   Leuk Esterase: x / RBC: x / WBC x   Sq Epi: x / Non Sq Epi: x / Bacteria: x

## 2025-01-24 NOTE — PROGRESS NOTE ADULT - SUBJECTIVE AND OBJECTIVE BOX
[facility]  WANG Mccoy Y. Patel, S. Shah, G. Doug  [phone]  ([phone] - weekdays after 5pm and weekends)    Name: JAN CALVIN  Age/Gender: 69y Male  MRN: [id number]    Interval History:  Patient seen and examined this morning.  Overnight patient hypoxic on VM/NRBM, now on HFNC.   Feels breathing ok on HFNC, feels thirsty. Febrile to Tm 101.8F.  Denies chest pain, dyspnea, cough, abd pain, n/v/d.   Notes reviewed  Allergies: No Known Allergies      Objective:  Vitals:   T(F): 99.5 (01-24-25 @ 06:52), Max: 101.8 (01-23-25 @ 16:52)  HR: 80 (01-24-25 @ 05:52) (71 - 100)  BP: 145/80 (01-24-25 @ 05:52) (124/68 - 175/74)  RR: 19 (01-24-25 @ 05:52) (18 - 20)  SpO2: 93% (01-24-25 @ 05:52) (81% - 97%)  Physical Examination:  General: no acute distress, HFNC  HEENT: normocephalic, atraumatic, anicteric  Lungs: decreased breath sounds b/l   Heart: S1, S2 present, normal rate  Abdomen: Soft, nontender, nondistended   Neuro: awake, alert, answers/follows  Extremities: No cyanosis. No edema.   Skin: Warm. Dry. No visible rash.   Lines: LUE PICC with no erythema/TTP    Laboratory Studies:  CBC:                       8.5    10.35 )-----------( 693      ( 24 Jan 2025 07:30 )             26.3     WBC Trend:  10.35 01-24-25 @ 07:30  7.93 01-23-25 @ 06:57  7.67 01-22-25 @ 06:52  10.76 01-21-25 @ 18:48  7.31 01-20-25 @ 06:57  6.55 01-19-25 @ 06:51  7.68 01-18-25 @ 07:02  6.45 01-17-25 @ 12:55    CMP: 01-24    132[L]  |  97  |  26[H]  ----------------------------<  21[LL]  4.5   |  22  |  0.93    Ca    9.1      24 Jan 2025 07:23  Phos  3.1     01-24  Mg     1.6     01-24    TPro  5.6[L]  /  Alb  2.3[L]  /  TBili  0.3  /  DBili  x   /  AST  64[H]  /  ALT  43  /  AlkPhos  96  01-24    Creatinine: 0.93 mg/dL (01-24-25 @ 07:23)  Creatinine: 0.84 mg/dL (01-23-25 @ 07:00)  Creatinine: 0.82 mg/dL (01-22-25 @ 06:53)  Creatinine: 1.04 mg/dL (01-21-25 @ 18:48)  Creatinine: 1.15 mg/dL (01-20-25 @ 06:57)  Creatinine: 1.04 mg/dL (01-19-25 @ 06:54)  Creatinine: 0.93 mg/dL (01-18-25 @ 07:02)  Creatinine: 1.01 mg/dL (01-18-25 @ 02:41)  Creatinine: 0.81 mg/dL (01-17-25 @ 12:55)    LIVER FUNCTIONS - ( 24 Jan 2025 07:23 )  Alb: 2.3 g/dL / Pro: 5.6 g/dL / ALK PHOS: 96 U/L / ALT: 43 U/L / AST: 64 U/L / GGT: x           Urinalysis (01.24.25 @ 00:46)    pH Urine: 5.5   Glucose Qualitative, Urine: Negative mg/dL   Blood, Urine: Negative   Color: Yellow   Urine Appearance: Clear   Bilirubin: Negative   Ketone - Urine: Negative mg/dL   Specific Gravity: 1.018   Protein, Urine: Trace mg/dL   Urobilinogen: 1.0 mg/dL   Nitrite: Negative   Leukocyte Esterase Concentration: Negative    Microbiology: reviewed   Culture - Urine (collected 01-22-25 @ 05:05)  Source: Clean Catch Clean Catch (Midstream)  Preliminary Report (01-23-25 @ 15:26):    10,000 - 49,000 CFU/mL Yeast like cells    Culture - Blood (collected 01-21-25 @ 22:25)  Source: .Blood BLOOD  Preliminary Report (01-24-25 @ 03:01):    No growth at 48 Hours    Culture - Blood (collected 01-21-25 @ 22:07)  Source: .Blood BLOOD  Preliminary Report (01-24-25 @ 03:01):    No growth at 48 Hours    Culture - Blood (collected 01-19-25 @ 18:36)  Source: .Blood BLOOD  Preliminary Report (01-23-25 @ 23:01):    No growth at 4 days    Culture - Urine (collected 01-19-25 @ 18:36)  Source: Clean Catch Clean Catch (Midstream)  Final Report (01-20-25 @ 21:26):    10,000 - 49,000 CFU/mL Candida albicans    "Susceptibilities not performed"    Culture - Urine (collected 01-17-25 @ 17:09)  Source: Clean Catch Clean Catch (Midstream)  Final Report (01-18-25 @ 20:03):    >=3 organisms. Probable collection contamination.    Culture - Blood (collected 01-17-25 @ 11:38)  Source: .Blood BLOOD  Final Report (01-22-25 @ 15:00):    No growth at 5 days    Radiology: reviewed   < from: CT Chest Abdomen and Pelvis No Cont (01.23.25 @ 17:07) >  FINDINGS:  CHEST:  LUNGS AND LARGE AIRWAYS: Patent central airways. Diffuse bilateral   predominantly perihilar alveolar opacities. Left base atelectasis  PLEURA: Small left effusion and tiny right effusion  VESSELS: Within normal limits.  HEART:Heart size is normal. No pericardial effusion.  MEDIASTINUM AND PATRICE: No lymphadenopathy.  CHEST WALL AND LOWER NECK: Within normal limits.    ABDOMEN AND PELVIS:  LIVER: 3.2 cm right hepatic lobe hypodensity without significant change.  BILE DUCTS: Normal caliber.  GALLBLADDER: Cholecystectomy.  SPLEEN: Within normal limits.  PANCREAS: Within normal limits.  ADRENALS: Within normal limits.  KIDNEYS/URETERS: Left nephrectomy and atrophic right kidney with 1.4 cm   indeterminate lesion in the interpolar region, unchanged. Right lower   quadrant renal transplant.    BLADDER: Within normal limits.  REPRODUCTIVE ORGANS: Prostate within normal limits.    BOWEL: No bowel obstruction. Appendix is not visualized.  PERITONEUM/RETROPERITONEUM: Unchanged retroperitoneal soft tissue   encasing the aorta and IVC.  VESSELS: Within normal limits.  LYMPH NODES: No lymphadenopathy.  ABDOMINAL WALL: Ventral abdominal wall postsurgical changes with an   unchanged 3.7 cm partially calcified fat and soft tissue containing   collection (3-270).  BONES: Diffuse erosive changes of the sacrum with soft tissue   infiltration, similar in appearance to prior. Sclerotic focus in the left   anterolateral seventh rib. Chronic rib fractures. Chronic left humeral   fracture. Left hip replacement  .    IMPRESSION:    Diffuse bilateral patchy groundglass opacities with central predominance,   increased from 1/17/2025, may represent edema or pneumonia.  Unchanged left lower lobe round atelectasis.  Small left pleural effusion.  Unchanged retroperitoneal soft tissue encasing the aorta and IVC.  Diffuse erosive changes of the sacrum with soft tissue infiltration,   similar in appearance to prior, which could represent osteomyelitis.    --- End of Report ---    < end of copied text >    Medications:  acetaminophen     Tablet .. 650 milliGRAM(s) Oral every 6 hours PRN  alteplase for catheter clearance 2 milliGRAM(s) Catheter once  alteplase for catheter clearance 2 milliGRAM(s) Catheter once  amLODIPine   Tablet 10 milliGRAM(s) Oral daily  buPROPion XL (24-Hour) . 300 milliGRAM(s) Oral daily  carvedilol 12.5 milliGRAM(s) Oral every 12 hours  chlorhexidine 4% Liquid 1 Application(s) Topical <User Schedule>  cloNIDine 0.1 milliGRAM(s) Oral three times a day  dextrose 5%. 1000 milliLiter(s) IV Continuous <Continuous>  dextrose 5%. 1000 milliLiter(s) IV Continuous <Continuous>  dextrose 50% Injectable 25 Gram(s) IV Push once  dextrose 50% Injectable 12.5 Gram(s) IV Push once  dextrose 50% Injectable 25 Gram(s) IV Push once  dextrose Oral Gel 15 Gram(s) Oral once PRN  enoxaparin Injectable 40 milliGRAM(s) SubCutaneous <User Schedule>  ertapenem  IVPB      ertapenem  IVPB 1000 milliGRAM(s) IV Intermittent every 24 hours  escitalopram 10 milliGRAM(s) Oral daily  glucagon  Injectable 1 milliGRAM(s) IntraMuscular once  glucagon  Injectable 1 milliGRAM(s) IntraMuscular once  hydrALAZINE 100 milliGRAM(s) Oral every 8 hours  insulin glargine Injectable (LANTUS) 38 Unit(s) SubCutaneous at bedtime  insulin lispro (ADMELOG) corrective regimen sliding scale   SubCutaneous three times a day before meals  insulin lispro (ADMELOG) corrective regimen sliding scale   SubCutaneous at bedtime  insulin lispro Injectable (ADMELOG) 8 Unit(s) SubCutaneous before breakfast  insulin lispro Injectable (ADMELOG) 8 Unit(s) SubCutaneous before lunch  insulin lispro Injectable (ADMELOG) 8 Unit(s) SubCutaneous before dinner  lactulose Syrup 15 Gram(s) Oral <User Schedule>  lactulose Syrup 10 Gram(s) Oral every 6 hours  levothyroxine 88 MICROGram(s) Oral daily  lisinopril 10 milliGRAM(s) Oral daily  mupirocin 2% Nasal 1 Application(s) Both Nostrils two times a day  pantoprazole   Suspension 40 milliGRAM(s) Oral daily  polyethylene glycol 3350 17 Gram(s) Oral at bedtime  polyethylene glycol 3350 17 Gram(s) Oral daily PRN  rosuvastatin 10 milliGRAM(s) Oral at bedtime  senna 2 Tablet(s) Oral at bedtime  sodium chloride 0.9% lock flush 10 milliLiter(s) IV Push every 1 hour PRN  tacrolimus 1 milliGRAM(s) Oral <User Schedule>  tacrolimus 1 milliGRAM(s) Oral at bedtime  vancomycin  IVPB      vancomycin  IVPB 1000 milliGRAM(s) IV Intermittent every 12 hours    Current Antimicrobials:  ertapenem  IVPB      ertapenem  IVPB 1000 milliGRAM(s) IV Intermittent every 24 hours  vancomycin  IVPB      vancomycin  IVPB 1000 milliGRAM(s) IV Intermittent every 12 hours    Prior/Completed Antimicrobials:  ertapenem  IVPB  piperacillin/tazobactam IVPB.  piperacillin/tazobactam IVPB.-  vancomycin  IVPB

## 2025-01-24 NOTE — PROVIDER CONTACT NOTE (OTHER) - RECOMMENDATIONS
1 gm iv Tylenol. work up to be collected. high flow nasal cannula
n/a
PO Tylenol and cooling measures
Oxy PO

## 2025-01-24 NOTE — PROGRESS NOTE ADULT - PROBLEM SELECTOR PLAN 1
Admitted  for R mini CHOP, found to be febrile on admission hold  chemo   Monitor CBC with diff, transfuse as needed.  Monitor electrolytes, replete as needed.  Daily weights.  Strict I/O. mouth care  Plans for any chemo or tafasitamab/lenalidomide are now ON HOLD given severe infections and fevers.

## 2025-01-24 NOTE — PROGRESS NOTE ADULT - ASSESSMENT
68 y/o M PMHx renal transplant 2012 (2/2 DM, s/p left nephrectomy), peripheral neuropathy, hypothyroidism, retroperitoneal fibrosis, HTN, HLD, GERD, anxiety/depression, ANGELIKA and recent admission at [facility] for sacral osteomyelitis and ESBL.coli urosepsis discharged on 12/18/24 to rehab. While admitted to [place] was noted to have hypercalcemia and liver masses which were biopsied on 12/16/24, biopsy revealed DLBCL. Patient received R mini CHOP on 12/28 now admitted for cycle #2 Rmini CHOP, upon admission patient is febrile and Chemotherapy is on HOLD. Pt has been pancytopenic secondary to chemotherapy as well as infection. Currently undergoing workup or suspected PJP pneumonia.

## 2025-01-24 NOTE — PROGRESS NOTE ADULT - PROBLEM SELECTOR PLAN 5
Continue Tacrolimus 1mg in am and 1 mg at HS.  Pred 5mg bid (50% dose reduction) 1/18/25 ON HOLD ON 1/22   AT RISK FOR ADRENAL INSUFFIENCEY IF HYPOTENSIVE 50mg hydrocortisone IV q8  s/p Hydrocortisone 25 mg IV, now off--on pred taper for PJP PNA

## 2025-01-24 NOTE — CONSULT NOTE ADULT - ASSESSMENT
68 y/o M PMHx renal transplant 2012 (2/2 DM, s/p left nephrectomy), peripheral neuropathy, hypothyroidism, retroperitoneal fibrosis, HTN, HLD, GERD, anxiety/depression, ANGELIKA and recent admission at [facility] for sacral osteomyelitis and ESBL.coli urosepsis discharged on 12/18/24 to rehab. While admitted to [place] was noted to have hypercalcemia and liver masses which were biopsied on 12/16/24, biopsy revealed DLBCL. Patient received R mini CHOP on 12/28 now admitted for cycle #2 Rmini CHOP, upon admission patient is febrile with increasing lung opacities for which pulmonary is consulted    ddx here would include PJP given on steroids and immunosuppressed but was not on Bactrim  viral pneumonitis would also be on ddx  the patient is on maximal oxygen therapy via HFNC and the risk of the procedure outweighs the benefit given he is already receiving empiric treatment for PJP, discussed risk/benefit with family and they are in agreement  suggest noninvasive workup to begin, then pending clinical course will further determine role for bronchoscopy  coronavirus+    - send PJP PCR sputum  - sputum culture  - viral serologies and PCR for CMV, EBV, HSV  - repeat full RVP  - broad spectrum abx per ID, empiric PJP coverage reasonable - would consider bumping steroids to 1 mg/kg for PJP  - f/u Fungitell, galactomannan  - pulm to follow    Please notify pulmonary prior to discharge and email [email or web address] to schedule an appointment; please provide appointment info to patient    Follow up at  [address], [address]  [place] [address]  tel: [phone]  fax: [phone]   68 y/o M PMHx renal transplant 2012 (2/2 DM, s/p left nephrectomy), peripheral neuropathy, hypothyroidism, retroperitoneal fibrosis, HTN, HLD, GERD, anxiety/depression, ANGELIKA and recent admission at [facility] for sacral osteomyelitis and ESBL.coli urosepsis discharged on 12/18/24 to rehab. While admitted to [place] was noted to have hypercalcemia and liver masses which were biopsied on 12/16/24, biopsy revealed DLBCL. Patient received R mini CHOP on 12/28 now admitted for cycle #2 Rmini CHOP, upon admission patient is febrile with increasing lung opacities for which pulmonary is consulted    ddx here would include PJP given on steroids and immunosuppressed but was not on Bactrim  viral pneumonitis would also be on ddx  the patient is on maximal oxygen therapy via HFNC and the risk of the procedure outweighs the benefit given he is already receiving empiric treatment for PJP, discussed risk/benefit with family and they are in agreement  suggest noninvasive workup to begin, then pending clinical course will further determine role for bronchoscopy  coronavirus+    - send PJP PCR sputum  - sputum culture  - viral serologies and PCR for CMV, EBV, HSV  - repeat full RVP  - broad spectrum abx per ID, empiric PJP coverage reasonable - would consider bumping steroids to 1 mg/kg for PJP  - f/u Fungitell, galactomannan  - add azithro, send urine legionella  - pulm to follow    Please notify pulmonary prior to discharge and email [email or web address] to schedule an appointment; please provide appointment info to patient    Follow up at  [address], [address]  [place] [address]  tel: [phone]  fax: [phone]   68 y/o M PMHx renal transplant 2012 (2/2 DM, s/p left nephrectomy), peripheral neuropathy, hypothyroidism, retroperitoneal fibrosis, HTN, HLD, GERD, anxiety/depression, ANGELIKA and recent admission at [facility] for sacral osteomyelitis and ESBL.coli urosepsis discharged on 12/18/24 to rehab. While admitted to [place] was noted to have hypercalcemia and liver masses which were biopsied on 12/16/24, biopsy revealed DLBCL. Patient received R mini CHOP on 12/28 now admitted for cycle #2 Rmini CHOP, upon admission patient is febrile with increasing lung opacities for which pulmonary is consulted    ddx here would include PJP given on steroids and immunosuppressed but was not on Bactrim  viral pneumonitis would also be on ddx  the patient is on maximal oxygen therapy via HFNC and the risk of the procedure outweighs the benefit given he is already receiving empiric treatment for PJP, discussed risk/benefit with family and they are in agreement  suggest noninvasive workup to begin, then pending clinical course will further determine role for bronchoscopy  coronavirus+    - send PJP PCR sputum  - sputum culture  - viral serologies and PCR for CMV, EBV, HSV  - repeat full RVP  - broad spectrum abx per ID, empiric PJP coverage reasonable - would consider bumping steroids to 1 mg/kg for PJP  - f/u Fungitell, galactomannan  - add azithro, send urine legionella  - recommend IV diuresis (can start with 20-40mg of IV lasix) and assess response given GGO with pleural effusions; check TTE  - pulm to follow    Please notify pulmonary prior to discharge and email [email or web address] to schedule an appointment; please provide appointment info to patient    Follow up at  [address] [place] [address], [address]  [place] [address]  tel: [phone]  fax: [phone]

## 2025-01-24 NOTE — PROGRESS NOTE ADULT - ASSESSMENT
Patient is a 69 year old male with PMH of renal transplant 2012 (2/2 DM, s/p left nephrectomy), peripheral neuropathy, hypothyroidism, retroperitoneal fibrosis, HTN, HLD, GERD, anxiety/depression, ANGELIKA and recent admission at [facility] for ESBL E.coli urosepsis, imaging with ?sacral OM though pt with no sacral wound suspected findings likely related to mets, he was discharged on 12/18/24 to rehab, recently diagnosed DLBCL while admitted to [place] when he was noted to have hypercalcemia and liver masses s/p biopsy 12/16/24, now s/p R mini CHOP on 12/28 who was admitted 1/17 for cycle #2 Rmini CHOP, upon admission patient febrile and chemotherapy was held for now.  Prior cultures reviewed, history of ESBL Klebsiella in Ucx 12/31/24, ESBL E.coli 11/29/24 Ucx     Fevers- post viral pneumonia, now with concern for PCP pneumonia  Viral URI d/t coronavirus (not COVID)  - 1/17 RVP with Coronavirus (229E,HKU1,NL63,OC43) detected   - 1/17 CT Chest with extensive new b/l ground glass nodular opacities, upper lobe dominant - possible pna; small R and trace L effusions  - 1/17 CTAP decreased R hepatic mass since 12/9/24; known splenic mass; changes likely c/w retroperitoneal fibrosis   - UA with some pyuria, no bacteria, Ucx with >3 organisms - suspect contaminated specimen    - repeat Ucx both with low colony count of yeasts/C.albicans, likely contaminant, no need to treat  - LUE PICC line with no sign of infection, no sign of SSTI, no rashes   - wound care eval noted for sacral and ischial DTI, no open wound noted  - MRSA PCR screen negative (+MSSA)  - s/p zosyn 1/17-1/20, escalated to ertapenem 1/20pm due to fevers   - 1/22 CXR mild pulm vasc congestion, small L effusion, no focal consolidation  - 1/17, 1/19, 1/21 Bcx remain NGTD  - 1/23 CTAP with no significant changes, diffuse erosive changes in sacrum with soft tissue infiltration similar to prior, low suspicion for OM given no open wound in area  - 1/23 CT Chest with diffuse b/l patchy ggo with central predominance increased from 1/17/25 - edema vs pneumonia; unchanged LLL round atelectasis; small L pleural effusion   - imaging reviewed, agree with c/f PCP based on repeat CT, ongoing fevers, worsening hypoxia now requiring HFNC, LDH elevated/increased, was on steroids and iso malignancy, noted vancomycin added overnight, s/p hydrocortisone 1/22-1/23    Recommendations:   Follow 1/24 repeat Bcx - in process  Follow fungitell, aspergillus ag, in process   Send sputum for PCP PCR if able to expectorate   Interventional Pulm to eval for possible bronchoscopy   Start on Bactrim 500mg IV Q8h (20mg/kg of TMP component divided over 3 doses) for now   With hypoxia and high suspicion for PCP, does meet criteria for steroids  - can start on prednisone 40mg PO BID x5d then 40mg daily x5d followed by 20mg daily for 11 days   Can hold on further vancomycin dosing, patient colonized with MSSA, negative for MRSA  Can hold further ertapenem as has been on it since 1/20 with no improvement, less likely bacterial    Monitor renal function closely    Monitor temps/CBC  Supportive care  Aspiration precautions  Wound care, offloading as able, nutrition  Continue rest of care per primary team     Over the weekend Dr. Cinthya Leos will be covering for our group. If you have any questions, concerns or new micro data please reach out to them using TEAMS *PREFERRED* or by calling our service at [phone].     D/w Dr. Ginette Ac M.D.  [facility]  Available on [employer] TEAMS - *PREFERRED*  [phone]  After 5pm on weekdays and all day on weekends - please call [phone]     Thank you for consulting us and involving us in the management of this patients case. In addition to reviewing history, imaging, documents, labs, microbiology, took into account antibiotic stewardship, local antibiogram and infection control strategies and potential transmission issues. Patient is a 69 year old male with PMH of renal transplant 2012 (2/2 DM, s/p left nephrectomy), peripheral neuropathy, hypothyroidism, retroperitoneal fibrosis, HTN, HLD, GERD, anxiety/depression, ANGELIKA and recent admission at [facility] for ESBL E.coli urosepsis, imaging with ?sacral OM though pt with no sacral wound suspected findings likely related to mets, he was discharged on 12/18/24 to rehab, recently diagnosed DLBCL while admitted to [place] when he was noted to have hypercalcemia and liver masses s/p biopsy 12/16/24, now s/p R mini CHOP on 12/28 who was admitted 1/17 for cycle #2 Rmini CHOP, upon admission patient febrile and chemotherapy was held for now.  Prior cultures reviewed, history of ESBL Klebsiella in Ucx 12/31/24, ESBL E.coli 11/29/24 Ucx     Fevers- post viral pneumonia, now with concern for PCP pneumonia  Viral URI d/t coronavirus (not COVID)  - 1/17 RVP with Coronavirus (229E,HKU1,NL63,OC43) detected   - 1/17 CT Chest with extensive new b/l ground glass nodular opacities, upper lobe dominant - possible pna; small R and trace L effusions  - 1/17 CTAP decreased R hepatic mass since 12/9/24; known splenic mass; changes likely c/w retroperitoneal fibrosis   - UA with some pyuria, no bacteria, Ucx with >3 organisms - suspect contaminated specimen    - repeat Ucx both with low colony count of yeasts/C.albicans, likely contaminant, no need to treat  - LUE PICC line with no sign of infection, no sign of SSTI, no rashes   - wound care eval noted for sacral and ischial DTI, no open wound noted  - MRSA PCR screen negative (+MSSA)  - s/p zosyn 1/17-1/20, escalated to ertapenem 1/20pm due to fevers   - 1/22 CXR mild pulm vasc congestion, small L effusion, no focal consolidation  - 1/17, 1/19, 1/21 Bcx remain NGTD  - 1/23 CTAP with no significant changes, diffuse erosive changes in sacrum with soft tissue infiltration similar to prior, low suspicion for OM given no open wound in area  - 1/23 CT Chest with diffuse b/l patchy ggo with central predominance increased from 1/17/25 - edema vs pneumonia; unchanged LLL round atelectasis; small L pleural effusion   - imaging reviewed, agree with c/f PCP based on repeat CT, ongoing fevers, worsening hypoxia now requiring HFNC, LDH elevated/increased, was on steroids and iso malignancy, noted vancomycin added overnight, s/p hydrocortisone 1/22-1/23    Recommendations:   Follow 1/24 repeat Bcx - in process  Follow fungitell, aspergillus ag, in process   Send sputum for PCP PCR if able to expectorate   Interventional Pulm to eval for possible bronchoscopy   Start on Bactrim 500mg IV Q8h (20mg/kg of TMP component divided over 3 doses) for now   With hypoxia and high suspicion for PCP, does meet criteria for steroids  - can start on prednisone 40mg PO BID x5d then 40mg daily x5d followed by 20mg daily for 11 days   Can hold on further vancomycin dosing, patient colonized with MSSA, negative for MRSA  Change ertapenem to meropenem for now as low albumin may be suboptimal activity   Monitor renal function closely    Monitor temps/CBC  Supportive care  Aspiration precautions  Wound care, offloading as able, nutrition  Continue rest of care per primary team     Over the weekend Dr. Cinthya Leos will be covering for our group. If you have any questions, concerns or new micro data please reach out to them using TEAMS *PREFERRED* or by calling our service at [phone].     D/w Dr. Ginette Ac M.D.  [facility]  Available on [employer] TEAMS - *PREFERRED*  [phone]  After 5pm on weekdays and all day on weekends - please call [phone]     Thank you for consulting us and involving us in the management of this patients case. In addition to reviewing history, imaging, documents, labs, microbiology, took into account antibiotic stewardship, local antibiogram and infection control strategies and potential transmission issues. Patient is a 69 year old male with PMH of renal transplant 2012 (2/2 DM, s/p left nephrectomy), peripheral neuropathy, hypothyroidism, retroperitoneal fibrosis, HTN, HLD, GERD, anxiety/depression, ANGELIKA and recent admission at [facility] for ESBL E.coli urosepsis, imaging with ?sacral OM though pt with no sacral wound suspected findings likely related to mets, he was discharged on 12/18/24 to rehab, recently diagnosed DLBCL while admitted to [place] when he was noted to have hypercalcemia and liver masses s/p biopsy 12/16/24, now s/p R mini CHOP on 12/28 who was admitted 1/17 for cycle #2 Rmini CHOP, upon admission patient febrile and chemotherapy was held for now.  Prior cultures reviewed, history of ESBL Klebsiella in Ucx 12/31/24, ESBL E.coli 11/29/24 Ucx     Fevers- post viral pneumonia, now with concern for PCP pneumonia  Viral URI d/t coronavirus (not COVID)  - 1/17 RVP with Coronavirus (229E,HKU1,NL63,OC43) detected   - 1/17 CT Chest with extensive new b/l ground glass nodular opacities, upper lobe dominant - possible pna; small R and trace L effusions  - 1/17 CTAP decreased R hepatic mass since 12/9/24; known splenic mass; changes likely c/w retroperitoneal fibrosis   - UA with some pyuria, no bacteria, Ucx with >3 organisms - suspect contaminated specimen    - repeat Ucx both with low colony count of yeasts/C.albicans, likely contaminant, no need to treat  - LUE PICC line with no sign of infection, no sign of SSTI, no rashes   - wound care eval noted for sacral and ischial DTI, no open wound noted  - MRSA PCR screen negative (+MSSA)  - s/p zosyn 1/17-1/20, escalated to ertapenem 1/20pm due to fevers   - 1/22 CXR mild pulm vasc congestion, small L effusion, no focal consolidation  - 1/17, 1/19, 1/21 Bcx remain NGTD  - 1/23 CTAP with no significant changes, diffuse erosive changes in sacrum with soft tissue infiltration similar to prior, low suspicion for OM given no open wound in area  - 1/23 CT Chest with diffuse b/l patchy ggo with central predominance increased from 1/17/25 - edema vs pneumonia; unchanged LLL round atelectasis; small L pleural effusion   - imaging reviewed, agree with c/f PCP based on repeat CT, ongoing fevers, worsening hypoxia now requiring HFNC, LDH elevated/increased, was on steroids and iso malignancy, noted vancomycin added overnight, s/p hydrocortisone 1/22-1/23    Recommendations:   Follow 1/24 repeat Bcx - in process  Follow fungitell, aspergillus ag, in process   Send sputum for PCP PCR if able to expectorate   Interventional Pulm to eval for possible bronchoscopy   Start on Bactrim 320mg IV Q8h (based on pts weight, d/w ID pharm) for now   With hypoxia and high suspicion for PCP, does meet criteria for steroids  - can start on prednisone 40mg PO BID x5d then 40mg daily x5d followed by 20mg daily for 11 days   Can hold on further vancomycin dosing, patient colonized with MSSA, negative for MRSA  Change ertapenem to meropenem 1g IV Q8h for now in setting of low albumin  Monitor renal function closely    Monitor temps/CBC  Supportive care  Aspiration precautions  Wound care, offloading as able, nutrition  Continue rest of care per primary team     Over the weekend Dr. Cinthya Leos will be covering for our group. If you have any questions, concerns or new micro data please reach out to them using TEAMS *PREFERRED* or by calling our service at [phone].     D/w Dr. Peng; ID pharmacist Dr. Won Ac M.D.  [facility]  Available on Microsoft TEAMS - *PREFERRED*  [phone]  After 5pm on weekdays and all day on weekends - please call [phone]     Thank you for consulting us and involving us in the management of this patients case. In addition to reviewing history, imaging, documents, labs, microbiology, took into account antibiotic stewardship, local antibiogram and infection control strategies and potential transmission issues.

## 2025-01-25 LAB
ALBUMIN SERPL ELPH-MCNC: 2.2 G/DL — LOW (ref 3.3–5)
ALP SERPL-CCNC: 95 U/L — SIGNIFICANT CHANGE UP (ref 40–120)
ALT FLD-CCNC: 40 U/L — SIGNIFICANT CHANGE UP (ref 10–45)
ANION GAP SERPL CALC-SCNC: 12 MMOL/L — SIGNIFICANT CHANGE UP (ref 5–17)
AST SERPL-CCNC: 54 U/L — HIGH (ref 10–40)
BASOPHILS # BLD AUTO: 0 K/UL — SIGNIFICANT CHANGE UP (ref 0–0.2)
BASOPHILS NFR BLD AUTO: 0 % — SIGNIFICANT CHANGE UP (ref 0–2)
BILIRUB SERPL-MCNC: 0.2 MG/DL — SIGNIFICANT CHANGE UP (ref 0.2–1.2)
BUN SERPL-MCNC: 35 MG/DL — HIGH (ref 7–23)
CALCIUM SERPL-MCNC: 9 MG/DL — SIGNIFICANT CHANGE UP (ref 8.4–10.5)
CHLORIDE SERPL-SCNC: 95 MMOL/L — LOW (ref 96–108)
CO2 SERPL-SCNC: 22 MMOL/L — SIGNIFICANT CHANGE UP (ref 22–31)
CREAT SERPL-MCNC: 1.05 MG/DL — SIGNIFICANT CHANGE UP (ref 0.5–1.3)
EBV EA AB SER IA-ACNC: <5 U/ML — SIGNIFICANT CHANGE UP
EBV EA AB TITR SER IF: POSITIVE
EBV EA IGG SER-ACNC: NEGATIVE — SIGNIFICANT CHANGE UP
EBV NA IGG SER IA-ACNC: >600 U/ML — HIGH
EBV PATRN SPEC IB-IMP: SIGNIFICANT CHANGE UP
EBV VCA IGG AVIDITY SER QL IA: POSITIVE
EBV VCA IGM SER IA-ACNC: <10 U/ML — SIGNIFICANT CHANGE UP
EBV VCA IGM SER IA-ACNC: >750 U/ML — HIGH
EBV VCA IGM TITR FLD: NEGATIVE — SIGNIFICANT CHANGE UP
EGFR: 78 ML/MIN/1.73M2 — SIGNIFICANT CHANGE UP
EOSINOPHIL # BLD AUTO: 0 K/UL — SIGNIFICANT CHANGE UP (ref 0–0.5)
EOSINOPHIL NFR BLD AUTO: 0 % — SIGNIFICANT CHANGE UP (ref 0–6)
FUNGITELL: >500 PG/ML — HIGH
GLUCOSE BLDC GLUCOMTR-MCNC: 418 MG/DL — HIGH (ref 70–99)
GLUCOSE BLDC GLUCOMTR-MCNC: 456 MG/DL — CRITICAL HIGH (ref 70–99)
GLUCOSE BLDC GLUCOMTR-MCNC: 456 MG/DL — CRITICAL HIGH (ref 70–99)
GLUCOSE BLDC GLUCOMTR-MCNC: 457 MG/DL — CRITICAL HIGH (ref 70–99)
GLUCOSE BLDC GLUCOMTR-MCNC: 472 MG/DL — CRITICAL HIGH (ref 70–99)
GLUCOSE BLDC GLUCOMTR-MCNC: 472 MG/DL — CRITICAL HIGH (ref 70–99)
GLUCOSE BLDC GLUCOMTR-MCNC: 479 MG/DL — CRITICAL HIGH (ref 70–99)
GLUCOSE SERPL-MCNC: 448 MG/DL — HIGH (ref 70–99)
HCT VFR BLD CALC: 24.7 % — LOW (ref 39–50)
HGB BLD-MCNC: 8 G/DL — LOW (ref 13–17)
LDH SERPL L TO P-CCNC: 617 U/L — HIGH (ref 50–242)
LEGIONELLA AG UR QL: NEGATIVE — SIGNIFICANT CHANGE UP
LYMPHOCYTES # BLD AUTO: 0.08 K/UL — LOW (ref 1–3.3)
LYMPHOCYTES # BLD AUTO: 0.9 % — LOW (ref 13–44)
MAGNESIUM SERPL-MCNC: 2.2 MG/DL — SIGNIFICANT CHANGE UP (ref 1.6–2.6)
MCHC RBC-ENTMCNC: 29.2 PG — SIGNIFICANT CHANGE UP (ref 27–34)
MCHC RBC-ENTMCNC: 32.4 G/DL — SIGNIFICANT CHANGE UP (ref 32–36)
MCV RBC AUTO: 90.1 FL — SIGNIFICANT CHANGE UP (ref 80–100)
MONOCYTES # BLD AUTO: 0.15 K/UL — SIGNIFICANT CHANGE UP (ref 0–0.9)
MONOCYTES NFR BLD AUTO: 1.8 % — LOW (ref 2–14)
NEUTROPHILS # BLD AUTO: 8.08 K/UL — HIGH (ref 1.8–7.4)
NEUTROPHILS NFR BLD AUTO: 93.8 % — HIGH (ref 43–77)
NT-PROBNP SERPL-SCNC: 1233 PG/ML — HIGH (ref 0–300)
PHOSPHATE SERPL-MCNC: 4.3 MG/DL — SIGNIFICANT CHANGE UP (ref 2.5–4.5)
PLATELET # BLD AUTO: 511 K/UL — HIGH (ref 150–400)
POTASSIUM SERPL-MCNC: 4.8 MMOL/L — SIGNIFICANT CHANGE UP (ref 3.5–5.3)
POTASSIUM SERPL-SCNC: 4.8 MMOL/L — SIGNIFICANT CHANGE UP (ref 3.5–5.3)
PROT SERPL-MCNC: 5.4 G/DL — LOW (ref 6–8.3)
RBC # BLD: 2.74 M/UL — LOW (ref 4.2–5.8)
RBC # FLD: 19.2 % — HIGH (ref 10.3–14.5)
SODIUM SERPL-SCNC: 129 MMOL/L — LOW (ref 135–145)
URATE SERPL-MCNC: 6.6 MG/DL — SIGNIFICANT CHANGE UP (ref 3.4–8.8)
WBC # BLD: 8.38 K/UL — SIGNIFICANT CHANGE UP (ref 3.8–10.5)
WBC # FLD AUTO: 8.38 K/UL — SIGNIFICANT CHANGE UP (ref 3.8–10.5)

## 2025-01-25 PROCEDURE — 99233 SBSQ HOSP IP/OBS HIGH 50: CPT | Mod: FS

## 2025-01-25 PROCEDURE — 99233 SBSQ HOSP IP/OBS HIGH 50: CPT

## 2025-01-25 RX ORDER — VALACYCLOVIR 1000 MG/1
500 TABLET ORAL EVERY 12 HOURS
Refills: 0 | Status: DISCONTINUED | OUTPATIENT
Start: 2025-01-25 | End: 2025-02-06

## 2025-01-25 RX ORDER — INSULIN LISPRO 100/ML
10 VIAL (ML) SUBCUTANEOUS
Refills: 0 | Status: DISCONTINUED | OUTPATIENT
Start: 2025-01-25 | End: 2025-01-26

## 2025-01-25 RX ADMIN — Medication 2 TABLET(S): at 21:57

## 2025-01-25 RX ADMIN — PREDNISONE 40 MILLIGRAM(S): 5 TABLET ORAL at 17:44

## 2025-01-25 RX ADMIN — Medication 100 MILLIGRAM(S): at 05:12

## 2025-01-25 RX ADMIN — MUPIROCIN 1 APPLICATION(S): 2 CREAM TOPICAL at 05:11

## 2025-01-25 RX ADMIN — Medication 10 GRAM(S): at 05:13

## 2025-01-25 RX ADMIN — ENOXAPARIN SODIUM 40 MILLIGRAM(S): 100 INJECTION SUBCUTANEOUS at 21:58

## 2025-01-25 RX ADMIN — ESCITALOPRAM 10 MILLIGRAM(S): 10 TABLET, FILM COATED ORAL at 12:59

## 2025-01-25 RX ADMIN — SULFAMETHOXAZOLE AND TRIMETHOPRIM 280 MILLIGRAM(S): 400; 80 TABLET ORAL at 22:24

## 2025-01-25 RX ADMIN — LEVOTHYROXINE SODIUM 88 MICROGRAM(S): 25 TABLET ORAL at 05:10

## 2025-01-25 RX ADMIN — Medication 10 UNIT(S): at 17:44

## 2025-01-25 RX ADMIN — INSULIN GLARGINE-YFGN 24 UNIT(S): 100 INJECTION, SOLUTION SUBCUTANEOUS at 21:59

## 2025-01-25 RX ADMIN — SULFAMETHOXAZOLE AND TRIMETHOPRIM 280 MILLIGRAM(S): 400; 80 TABLET ORAL at 06:17

## 2025-01-25 RX ADMIN — CLONIDINE HYDROCHLORIDE 0.1 MILLIGRAM(S): 0.2 TABLET ORAL at 05:10

## 2025-01-25 RX ADMIN — VALACYCLOVIR 500 MILLIGRAM(S): 1000 TABLET ORAL at 17:44

## 2025-01-25 RX ADMIN — Medication 10 MILLIGRAM(S): at 05:11

## 2025-01-25 RX ADMIN — ROSUVASTATIN CALCIUM 10 MILLIGRAM(S): 10 TABLET, FILM COATED ORAL at 21:58

## 2025-01-25 RX ADMIN — Medication 12: at 09:12

## 2025-01-25 RX ADMIN — Medication 15 GRAM(S): at 21:56

## 2025-01-25 RX ADMIN — PREDNISONE 40 MILLIGRAM(S): 5 TABLET ORAL at 05:10

## 2025-01-25 RX ADMIN — TACROLIMUS 1 MILLIGRAM(S): 1 CAPSULE, GELATIN COATED ORAL at 09:13

## 2025-01-25 RX ADMIN — ANTISEPTIC SURGICAL SCRUB 1 APPLICATION(S): 0.04 SOLUTION TOPICAL at 09:13

## 2025-01-25 RX ADMIN — MUPIROCIN 1 APPLICATION(S): 2 CREAM TOPICAL at 17:44

## 2025-01-25 RX ADMIN — Medication 8: at 22:00

## 2025-01-25 RX ADMIN — Medication 10 MILLIGRAM(S): at 05:12

## 2025-01-25 RX ADMIN — TACROLIMUS 1 MILLIGRAM(S): 1 CAPSULE, GELATIN COATED ORAL at 21:58

## 2025-01-25 RX ADMIN — POLYETHYLENE GLYCOL 3350 17 GRAM(S): 17 POWDER, FOR SOLUTION ORAL at 21:58

## 2025-01-25 RX ADMIN — PANTOPRAZOLE 40 MILLIGRAM(S): 20 TABLET, DELAYED RELEASE ORAL at 12:59

## 2025-01-25 RX ADMIN — MEROPENEM 100 MILLIGRAM(S): 500 INJECTION INTRAVENOUS at 21:58

## 2025-01-25 RX ADMIN — SULFAMETHOXAZOLE AND TRIMETHOPRIM 280 MILLIGRAM(S): 400; 80 TABLET ORAL at 15:23

## 2025-01-25 RX ADMIN — Medication 10 GRAM(S): at 17:44

## 2025-01-25 RX ADMIN — Medication 10 UNIT(S): at 12:58

## 2025-01-25 RX ADMIN — Medication 12: at 17:43

## 2025-01-25 RX ADMIN — MEROPENEM 100 MILLIGRAM(S): 500 INJECTION INTRAVENOUS at 13:46

## 2025-01-25 RX ADMIN — BUPROPION HYDROCHLORIDE 300 MILLIGRAM(S): 150 TABLET, EXTENDED RELEASE ORAL at 12:59

## 2025-01-25 RX ADMIN — Medication 10 UNIT(S): at 09:45

## 2025-01-25 RX ADMIN — Medication 12: at 12:58

## 2025-01-25 RX ADMIN — MEROPENEM 100 MILLIGRAM(S): 500 INJECTION INTRAVENOUS at 05:10

## 2025-01-25 NOTE — PROGRESS NOTE ADULT - ASSESSMENT
Patient is a 69 year old male with PMH of renal transplant 2012 (2/2 DM, s/p left nephrectomy), peripheral neuropathy, hypothyroidism, retroperitoneal fibrosis, HTN, HLD, GERD, anxiety/depression, ANGELIKA and recent admission at [facility] for ESBL E.coli urosepsis, imaging with ?sacral OM though pt with no sacral wound suspected findings likely related to mets, he was discharged on 12/18/24 to rehab, recently diagnosed DLBCL while admitted to [place] when he was noted to have hypercalcemia and liver masses s/p biopsy 12/16/24, now s/p R mini CHOP on 12/28 who was admitted 1/17 for cycle #2 Rmini CHOP, upon admission patient febrile and chemotherapy was held for now.  Prior cultures reviewed, history of ESBL Klebsiella in Ucx 12/31/24, ESBL E.coli 11/29/24 Ucx     Fevers- post viral pneumonia, now with concern for PCP pneumonia  Viral URI d/t coronavirus (not COVID)  - 1/17 RVP with Coronavirus (229E,HKU1,NL63,OC43) detected   - 1/17 CT Chest with extensive new b/l ground glass nodular opacities, upper lobe dominant - possible pna; small R and trace L effusions  - 1/17 CTAP decreased R hepatic mass since 12/9/24; known splenic mass; changes likely c/w retroperitoneal fibrosis   - UA with some pyuria, no bacteria, Ucx with >3 organisms - suspect contaminated specimen    - repeat Ucx both with low colony count of yeasts/C.albicans, likely contaminant, no need to treat  - LUE PICC line with no sign of infection, no sign of SSTI, no rashes   - wound care eval noted for sacral and ischial DTI, no open wound noted  - MRSA PCR screen negative (+MSSA)  - s/p zosyn 1/17-1/20, escalated to ertapenem 1/20pm due to fevers   - 1/22 CXR mild pulm vasc congestion, small L effusion, no focal consolidation  - 1/17, 1/19, 1/21 Bcx remain NGTD  - 1/23 CTAP with no significant changes, diffuse erosive changes in sacrum with soft tissue infiltration similar to prior, low suspicion for OM given no open wound in area  - 1/23 CT Chest with diffuse b/l patchy ggo with central predominance increased from 1/17/25 - edema vs pneumonia; unchanged LLL round atelectasis; small L pleural effusion   - imaging reviewed, agree with c/f PCP based on repeat CT, ongoing fevers, worsening hypoxia now requiring HFNC, LDH elevated/increased, was on steroids and iso malignancy, noted vancomycin added overnight, s/p hydrocortisone 1/22-1/23 1/25 afeb 24 hours, ua neg , blood cx neg to date     Recommendations:     Follow fungitell, aspergillus ag, in process   Send sputum for PCP PCR if able to expectorate   Interventional Pulm to eval for possible bronchoscopy     cont  Bactrim 320mg IV Q8h (based on pts weight, d/w ID pharm) for now    prednisone 40mg PO BID x5d then 40mg daily x5d followed by 20mg daily for 11 days   meropenem 1g IV Q8h for now in setting of low albumin  Monitor renal function closely    Monitor temps/CBC  Supportive care  Aspiration precautions  Wound care, offloading as able, nutrition  Continue rest of care per primary team       [facility]  Available on [employer] TEAMS - *PREFERRED*  [phone]  After 5pm on weekdays and all day on weekends - please call [phone]     Thank you for consulting us and involving us in the management of this patients case. In addition to reviewing history, imaging, documents, labs, microbiology, took into account antibiotic stewardship, local antibiogram and infection control strategies and potential transmission issues. Patient is a 69 year old male with PMH of renal transplant 2012 (2/2 DM, s/p left nephrectomy), peripheral neuropathy, hypothyroidism, retroperitoneal fibrosis, HTN, HLD, GERD, anxiety/depression, ANGELIKA and recent admission at [facility] for ESBL E.coli urosepsis, imaging with ?sacral OM though pt with no sacral wound suspected findings likely related to mets, he was discharged on 12/18/24 to rehab, recently diagnosed DLBCL while admitted to [place] when he was noted to have hypercalcemia and liver masses s/p biopsy 12/16/24, now s/p R mini CHOP on 12/28 who was admitted 1/17 for cycle #2 Rmini CHOP, upon admission patient febrile and chemotherapy was held for now.  Prior cultures reviewed, history of ESBL Klebsiella in Ucx 12/31/24, ESBL E.coli 11/29/24 Ucx     Fevers- post viral pneumonia, now with concern for PCP pneumonia  Viral URI d/t coronavirus (not COVID)  - 1/17 RVP with Coronavirus (229E,HKU1,NL63,OC43) detected   - 1/17 CT Chest with extensive new b/l ground glass nodular opacities, upper lobe dominant - possible pna; small R and trace L effusions  - 1/17 CTAP decreased R hepatic mass since 12/9/24; known splenic mass; changes likely c/w retroperitoneal fibrosis   - UA with some pyuria, no bacteria, Ucx with >3 organisms - suspect contaminated specimen    - repeat Ucx both with low colony count of yeasts/C.albicans, likely contaminant, no need to treat  - LUE PICC line with no sign of infection, no sign of SSTI, no rashes   - wound care eval noted for sacral and ischial DTI, no open wound noted  - MRSA PCR screen negative (+MSSA)  - s/p zosyn 1/17-1/20, escalated to ertapenem 1/20pm due to fevers   - 1/22 CXR mild pulm vasc congestion, small L effusion, no focal consolidation  - 1/17, 1/19, 1/21 Bcx remain NGTD  - 1/23 CTAP with no significant changes, diffuse erosive changes in sacrum with soft tissue infiltration similar to prior, low suspicion for OM given no open wound in area  - 1/23 CT Chest with diffuse b/l patchy ggo with central predominance increased from 1/17/25 - edema vs pneumonia; unchanged LLL round atelectasis; small L pleural effusion   - imaging reviewed, agree with c/f PCP based on repeat CT, ongoing fevers, worsening hypoxia now requiring HFNC, LDH elevated/increased, was on steroids and iso malignancy, noted vancomycin added overnight, s/p hydrocortisone 1/22-1/23 1/25 afeb 24 hours, ua neg , blood cx neg to date     Recommendations:     Follow fungitell, aspergillus ag, in process   Send sputum for PCP PCR if able to expectorate   Interventional Pulm to eval for possible bronchoscopy     cont  Bactrim 320mg IV Q8h (based on pts weight, d/w ID pharm) for now    prednisone 40mg PO BID x5d then 40mg daily x5d followed by 20mg daily for 11 days   meropenem 1g IV Q8h for now in setting of low albumin  Monitor renal function closely    valtrex ppx    Monitor temps/CBC  Supportive care  Aspiration precautions  Wound care, offloading as able, nutrition  Continue rest of care per primary team       [facility]  Available on [employer] TEAMS - *PREFERRED*  [phone]  After 5pm on weekdays and all day on weekends - please call [phone]     Thank you for consulting us and involving us in the management of this patients case. In addition to reviewing history, imaging, documents, labs, microbiology, took into account antibiotic stewardship, local antibiogram and infection control strategies and potential transmission issues.

## 2025-01-25 NOTE — PROGRESS NOTE ADULT - ASSESSMENT
70 y/o M PMHx renal transplant 2012 (2/2 DM, s/p left nephrectomy), peripheral neuropathy, hypothyroidism, retroperitoneal fibrosis, HTN, HLD, GERD, anxiety/depression, ANGELIKA and recent admission at [facility] for sacral osteomyelitis and ESBL.coli urosepsis discharged on 12/18/24 to rehab. While admitted to [facility] [place] was noted to have hypercalcemia and liver masses which were biopsied on 12/16/24, biopsy revealed DLBCL. Patient received R mini CHOP on 12/28 now admitted for cycle #2 Rmini CHOP, upon admission patient is febrile with increasing lung opacities for which pulmonary is consulted    ddx here would include PJP given on steroids and immunosuppressed but was not on Bactrim  viral pneumonitis would also be on ddx  the patient is on maximal oxygen therapy via HFNC and the risk of the procedure outweighs the benefit given he is already receiving empiric treatment for PJP, discussed risk/benefit with family and they are in agreement  suggest noninvasive workup to begin, then pending clinical course will further determine role for bronchoscopy  coronavirus+    - send PJP PCR sputum  - sputum culture  - viral serologies and PCR for CMV, EBV, HSV  - repeat full RVP  - broad spectrum abx per ID, empiric PJP coverage reasonable - would consider bumping steroids to 1 mg/kg for PJP  - f/u Fungitell, galactomannan  - add azithro, send urine legionella  - recommend IV diuresis (can start with 20-40mg of IV lasix) and assess response given GGO with pleural effusions; check TTE  - pulm to follow    Please notify pulmonary prior to discharge and email [email or web address] to schedule an appointment; please provide appointment info to patient    Follow up at  [address] [place] [address], [address]  [place] [address]  tel: [phone]  fax: [phone]

## 2025-01-25 NOTE — PROGRESS NOTE ADULT - SUBJECTIVE AND OBJECTIVE BOX
[facility]  Infectious Disease  Dr Brinoes, Dr Leos, Dr Saul, FELICIA Limon Doug  [phone]  after hours and weekends [phone]    Name: JAN CALVIN  Age: 69y  Gender: Male  MRN: [id number]    Interval History--  Notes reviewed  state Im hungry      Allergies    No Known Allergies    Intolerances        Medications--  Antibiotics:  meropenem  IVPB 1000 milliGRAM(s) IV Intermittent every 8 hours  trimethoprim / sulfamethoxazole IVPB 320 milliGRAM(s) IV Intermittent every 8 hours  valACYclovir 500 milliGRAM(s) Oral every 12 hours    Immunologic:  tacrolimus 1 milliGRAM(s) Oral <User Schedule>  tacrolimus 1 milliGRAM(s) Oral at bedtime    Other:  acetaminophen     Tablet .. PRN  alteplase for catheter clearance  alteplase for catheter clearance  amLODIPine   Tablet  buPROPion XL (24-Hour) .  carvedilol  chlorhexidine 4% Liquid  cloNIDine  dextrose 5%.  dextrose 5%.  dextrose 50% Injectable  dextrose 50% Injectable  dextrose 50% Injectable  dextrose Oral Gel PRN  enoxaparin Injectable  escitalopram  glucagon  Injectable  glucagon  Injectable  hydrALAZINE  insulin glargine Injectable (LANTUS)  insulin lispro (ADMELOG) corrective regimen sliding scale  insulin lispro (ADMELOG) corrective regimen sliding scale  insulin lispro Injectable (ADMELOG)  insulin lispro Injectable (ADMELOG)  insulin lispro Injectable (ADMELOG)  lactulose Syrup  lactulose Syrup  levothyroxine  lisinopril  mupirocin 2% Nasal  pantoprazole   Suspension  polyethylene glycol 3350 PRN  polyethylene glycol 3350  predniSONE   Tablet  rosuvastatin  senna  sodium chloride 0.9% lock flush PRN      Review of Systems--  A 10-point review of systems was obtained.   no sob  no vomiting  no diarrhea  no cough  no headache   Review of systems otherwise negative except as previously noted.    Physical Examination--  Vital Signs: T(F): 97.1 (01-25-25 @ 09:55), Max: 98.8 (01-24-25 @ 21:19)  HR: 89 (01-25-25 @ 09:55)  BP: 93/53 (01-25-25 @ 09:55)  RR: 16 (01-25-25 @ 09:55)  SpO2: 99% (01-25-25 @ 09:55)  Wt(kg): --  General: ill appearing  HEENT: AT/NC.   Neck: Not rigid. No sense of mass.  Nodes: None palpable.  Lungs: Clear b anteriorly   Heart:distant s1s2   Abdomen: Bowel sounds present and normoactive. Soft. Nondistended.   Extremities: No cyanosis or clubbing. trace edema.   Skin: Warm. Dry. Good turgor. No rash. No vasculitic stigmata.  Psychiatric: Appropriate affect and mood for situation.         Laboratory Studies--  CBC                        8.0    8.38  )-----------( 511      ( 25 Jan 2025 06:37 )             24.7       Chemistries  01-25    129[L]  |  95[L]  |  35[H]  ----------------------------<  448[H]  4.8   |  22  |  1.05    Ca    9.0      25 Jan 2025 06:37  Phos  4.3     01-25  Mg     2.2     01-25    TPro  5.4[L]  /  Alb  2.2[L]  /  TBili  0.2  /  DBili  x   /  AST  54[H]  /  ALT  40  /  AlkPhos  95  01-25      Culture Data    Culture - Blood (collected 24 Jan 2025 00:46)  Source: .Blood BLOOD  Preliminary Report (25 Jan 2025 04:01):    No growth at 24 hours    Culture - Blood (collected 24 Jan 2025 00:18)  Source: .Blood BLOOD  Preliminary Report (25 Jan 2025 04:01):    No growth at 24 hours    Culture - Urine (collected 22 Jan 2025 05:05)  Source: Clean Catch Clean Catch (Midstream)  Final Report (24 Jan 2025 13:31):    10,000 - 49,000 CFU/mL Candida albicans    "Susceptibilities not performed"    Culture - Blood (collected 21 Jan 2025 22:25)  Source: .Blood BLOOD  Preliminary Report (25 Jan 2025 03:01):    No growth at 72 Hours    Culture - Blood (collected 21 Jan 2025 22:07)  Source: .Blood BLOOD  Preliminary Report (25 Jan 2025 03:01):    No growth at 72 Hours    Culture - Urine (collected 19 Jan 2025 18:36)  Source: Clean Catch Clean Catch (Midstream)  Final Report (20 Jan 2025 21:26):    10,000 - 49,000 CFU/mL Candida albicans    "Susceptibilities not performed"    Culture - Blood (collected 19 Jan 2025 18:36)  Source: .Blood BLOOD  Final Report (24 Jan 2025 23:00):    No growth at 5 days

## 2025-01-25 NOTE — PROGRESS NOTE ADULT - PROBLEM SELECTOR PLAN 3
Monitor FS AC/HS  Sliding scale Humalog AC/HS.  1/24 hypoglycemic to FS 20s off steroids-->adding steroids for PJP PNA, will also lower lantus 38-->24U QHS and continue 8U premeal admelog Cough

## 2025-01-25 NOTE — PROGRESS NOTE ADULT - ASSESSMENT
70 y/o M PMHx renal transplant 2012 (2/2 DM, s/p left nephrectomy), peripheral neuropathy, hypothyroidism, retroperitoneal fibrosis, HTN, HLD, GERD, anxiety/depression, ANGELIKA and recent admission at [facility] for sacral osteomyelitis and ESBL.coli urosepsis discharged on 12/18/24 to rehab. While admitted to [place] was noted to have hypercalcemia and liver masses which were biopsied on 12/16/24, biopsy revealed DLBCL. Patient received R mini CHOP on 12/28 now admitted for cycle #2 Rmini CHOP, upon admission patient is febrile and Chemotherapy is on HOLD. Pt has been pancytopenic secondary to chemotherapy as well as infection. Currently undergoing workup or suspected PJP pneumonia. 68 y/o M PMHx renal transplant 2012 (2/2 DM, s/p left nephrectomy), peripheral neuropathy, hypothyroidism, retroperitoneal fibrosis, HTN, HLD, GERD, anxiety/depression, ANGELIKA and recent admission at [facility] for sacral osteomyelitis and ESBL.coli urosepsis discharged on 12/18/24 to rehab. While admitted to [place] was noted to have hypercalcemia and liver masses which were biopsied on 12/16/24, biopsy revealed DLBCL. Patient received R mini CHOP on 12/28 now admitted for cycle #2 R-mini CHOP, upon admission patient is febrile and Chemotherapy is on HOLD. Pt has been pancytopenic secondary to chemotherapy as well as infection. Currently undergoing workup or suspected PJP pneumonia.

## 2025-01-25 NOTE — PROGRESS NOTE ADULT - SUBJECTIVE AND OBJECTIVE BOX
Diagnosis PTLD    Protocol/Chemo Regimen: None  Day: n/a    24hr: CT chest c/f PJP PNA. Hypoglycemic in AM. Discussed with pt holding off therapy now in favor of bronch/infectious workup and antibiotics.                            Allergies: No Known Allergies    ANTIMICROBIALS  meropenem  IVPB 1000 milliGRAM(s) IV Intermittent every 8 hours  trimethoprim / sulfamethoxazole IVPB 320 milliGRAM(s) IV Intermittent every 8 hours  valACYclovir 500 milliGRAM(s) Oral every 12 hours      HEME/ONC MEDICATIONS  alteplase for catheter clearance 2 milliGRAM(s) Catheter once  alteplase for catheter clearance 2 milliGRAM(s) Catheter once  enoxaparin Injectable 40 milliGRAM(s) SubCutaneous <User Schedule>      STANDING MEDICATIONS  amLODIPine   Tablet 10 milliGRAM(s) Oral daily  buPROPion XL (24-Hour) . 300 milliGRAM(s) Oral daily  carvedilol 12.5 milliGRAM(s) Oral every 12 hours  chlorhexidine 4% Liquid 1 Application(s) Topical <User Schedule>  cloNIDine 0.1 milliGRAM(s) Oral three times a day  dextrose 5%. 1000 milliLiter(s) IV Continuous <Continuous>  dextrose 5%. 1000 milliLiter(s) IV Continuous <Continuous>  dextrose 50% Injectable 25 Gram(s) IV Push once  dextrose 50% Injectable 12.5 Gram(s) IV Push once  dextrose 50% Injectable 25 Gram(s) IV Push once  escitalopram 10 milliGRAM(s) Oral daily  glucagon  Injectable 1 milliGRAM(s) IntraMuscular once  glucagon  Injectable 1 milliGRAM(s) IntraMuscular once  hydrALAZINE 100 milliGRAM(s) Oral every 8 hours  insulin glargine Injectable (LANTUS) 24 Unit(s) SubCutaneous at bedtime  insulin lispro (ADMELOG) corrective regimen sliding scale   SubCutaneous three times a day before meals  insulin lispro (ADMELOG) corrective regimen sliding scale   SubCutaneous at bedtime  insulin lispro Injectable (ADMELOG) 10 Unit(s) SubCutaneous before breakfast  insulin lispro Injectable (ADMELOG) 10 Unit(s) SubCutaneous before lunch  insulin lispro Injectable (ADMELOG) 10 Unit(s) SubCutaneous before dinner  lactulose Syrup 10 Gram(s) Oral every 6 hours  lactulose Syrup 15 Gram(s) Oral <User Schedule>  levothyroxine 88 MICROGram(s) Oral daily  lisinopril 10 milliGRAM(s) Oral daily  mupirocin 2% Nasal 1 Application(s) Both Nostrils two times a day  pantoprazole   Suspension 40 milliGRAM(s) Oral daily  polyethylene glycol 3350 17 Gram(s) Oral at bedtime  predniSONE   Tablet 40 milliGRAM(s) Oral two times a day  rosuvastatin 10 milliGRAM(s) Oral at bedtime  senna 2 Tablet(s) Oral at bedtime  tacrolimus 1 milliGRAM(s) Oral <User Schedule>  tacrolimus 1 milliGRAM(s) Oral at bedtime    PRN MEDICATIONS  acetaminophen     Tablet .. 650 milliGRAM(s) Oral every 6 hours PRN  dextrose Oral Gel 15 Gram(s) Oral once PRN  polyethylene glycol 3350 17 Gram(s) Oral daily PRN  sodium chloride 0.9% lock flush 10 milliLiter(s) IV Push every 1 hour PRN    Vital Signs Last 24 Hrs  T(C): 36.4 (25 Jan 2025 13:00), Max: 37.1 (24 Jan 2025 21:19)  T(F): 97.5 (25 Jan 2025 13:00), Max: 98.8 (24 Jan 2025 21:19)  HR: 66 (25 Jan 2025 15:45) (63 - 89)  BP: 105/58 (25 Jan 2025 13:00) (93/53 - 149/64)  RR: 18 (25 Jan 2025 15:45) (16 - 20)  SpO2: 96% (25 Jan 2025 15:45) (95% - 100%)    Parameters below as of 25 Jan 2025 15:45  Patient On (Oxygen Delivery Method): nasal cannula, high flow  O2 Flow (L/min): 60  O2 Concentration (%): 80    PHYSICAL EXAM  General: adult in NAD  HEENT: clear oropharynx, anicteric sclera  Neck: supple  CV: normal S1/S2 RRR  Lungs: positive air movement on HFNC  Abdomen: soft non-tender non-distended, (+) BS  Ext: no edema  Skin: no rashes and no petechiae  Neuro: alert and oriented X 3, no focal deficits  Central Line: PICC c/d/i    LABS:                        8.0    8.38  )-----------( 511      ( 25 Jan 2025 06:37 )             24.7     Mean Cell Volume : 90.1 fl  Mean Cell Hemoglobin : 29.2 pg  Mean Cell Hemoglobin Concentration : 32.4 g/dL  Auto Neutrophil # : 8.08 K/uL  Auto Lymphocyte # : 0.08 K/uL  Auto Monocyte # : 0.15 K/uL  Auto Eosinophil # : 0.00 K/uL  Auto Basophil # : 0.00 K/uL  Auto Neutrophil % : 93.8 %  Auto Lymphocyte % : 0.9 %  Auto Monocyte % : 1.8 %  Auto Eosinophil % : 0.0 %  Auto Basophil % : 0.0 %    01-25    129[L]  |  95[L]  |  35[H]  ----------------------------<  448[H]  4.8   |  22  |  1.05    Ca    9.0      25 Jan 2025 06:37  Phos  4.3     01-25  Mg     2.2     01-25    TPro  5.4[L]  /  Alb  2.2[L]  /  TBili  0.2  /  DBili  x   /  AST  54[H]  /  ALT  40  /  AlkPhos  95  01-25      Uric Acid 6.6    --------------------  Cultures:  Culture - Blood (01.24.25 @ 00:46)    Specimen Source: .Blood BLOOD   Culture Results:   No growth at 24 hours            RADIOLOGY & ADDITIONAL STUDIES:         Diagnosis PTLD    Protocol/Chemo Regimen: None  Day: n/a    24hr: CT chest c/f PJP PNA. Hypoglycemic in AM. Discussed with pt holding off therapy now in favor of bronch/infectious workup and antibiotics.                            Allergies: No Known Allergies    ANTIMICROBIALS  meropenem  IVPB 1000 milliGRAM(s) IV Intermittent every 8 hours  trimethoprim / sulfamethoxazole IVPB 320 milliGRAM(s) IV Intermittent every 8 hours  valACYclovir 500 milliGRAM(s) Oral every 12 hours      HEME/ONC MEDICATIONS  alteplase for catheter clearance 2 milliGRAM(s) Catheter once  alteplase for catheter clearance 2 milliGRAM(s) Catheter once  enoxaparin Injectable 40 milliGRAM(s) SubCutaneous <User Schedule>      STANDING MEDICATIONS  amLODIPine   Tablet 10 milliGRAM(s) Oral daily  buPROPion XL (24-Hour) . 300 milliGRAM(s) Oral daily  carvedilol 12.5 milliGRAM(s) Oral every 12 hours  chlorhexidine 4% Liquid 1 Application(s) Topical <User Schedule>  cloNIDine 0.1 milliGRAM(s) Oral three times a day  dextrose 5%. 1000 milliLiter(s) IV Continuous <Continuous>  dextrose 5%. 1000 milliLiter(s) IV Continuous <Continuous>  dextrose 50% Injectable 25 Gram(s) IV Push once  dextrose 50% Injectable 12.5 Gram(s) IV Push once  dextrose 50% Injectable 25 Gram(s) IV Push once  escitalopram 10 milliGRAM(s) Oral daily  glucagon  Injectable 1 milliGRAM(s) IntraMuscular once  glucagon  Injectable 1 milliGRAM(s) IntraMuscular once  hydrALAZINE 100 milliGRAM(s) Oral every 8 hours  insulin glargine Injectable (LANTUS) 24 Unit(s) SubCutaneous at bedtime  insulin lispro (ADMELOG) corrective regimen sliding scale   SubCutaneous three times a day before meals  insulin lispro (ADMELOG) corrective regimen sliding scale   SubCutaneous at bedtime  insulin lispro Injectable (ADMELOG) 10 Unit(s) SubCutaneous before breakfast  insulin lispro Injectable (ADMELOG) 10 Unit(s) SubCutaneous before lunch  insulin lispro Injectable (ADMELOG) 10 Unit(s) SubCutaneous before dinner  lactulose Syrup 10 Gram(s) Oral every 6 hours  lactulose Syrup 15 Gram(s) Oral <User Schedule>  levothyroxine 88 MICROGram(s) Oral daily  lisinopril 10 milliGRAM(s) Oral daily  mupirocin 2% Nasal 1 Application(s) Both Nostrils two times a day  pantoprazole   Suspension 40 milliGRAM(s) Oral daily  polyethylene glycol 3350 17 Gram(s) Oral at bedtime  predniSONE   Tablet 40 milliGRAM(s) Oral two times a day  rosuvastatin 10 milliGRAM(s) Oral at bedtime  senna 2 Tablet(s) Oral at bedtime  tacrolimus 1 milliGRAM(s) Oral <User Schedule>  tacrolimus 1 milliGRAM(s) Oral at bedtime    PRN MEDICATIONS  acetaminophen     Tablet .. 650 milliGRAM(s) Oral every 6 hours PRN  dextrose Oral Gel 15 Gram(s) Oral once PRN  polyethylene glycol 3350 17 Gram(s) Oral daily PRN  sodium chloride 0.9% lock flush 10 milliLiter(s) IV Push every 1 hour PRN    Vital Signs Last 24 Hrs  T(C): 36.4 (25 Jan 2025 13:00), Max: 37.1 (24 Jan 2025 21:19)  T(F): 97.5 (25 Jan 2025 13:00), Max: 98.8 (24 Jan 2025 21:19)  HR: 66 (25 Jan 2025 15:45) (63 - 89)  BP: 105/58 (25 Jan 2025 13:00) (93/53 - 149/64)  RR: 18 (25 Jan 2025 15:45) (16 - 20)  SpO2: 96% (25 Jan 2025 15:45) (95% - 100%)    Parameters below as of 25 Jan 2025 15:45  Patient On (Oxygen Delivery Method): nasal cannula, high flow  O2 Flow (L/min): 60  O2 Concentration (%): 80    PHYSICAL EXAM  General: adult in NAD  HEENT: clear oropharynx, anicteric sclera  Neck: supple  CV: normal S1/S2 RRR  Lungs: positive air movement on HFNC  Abdomen: soft non-tender non-distended, (+) BS  Ext: no edema  Skin: no rashes and no petechiae  Neuro: alert and oriented X 3, no focal deficits  Central Line: PICC c/d/i    LABS:                        8.0    8.38  )-----------( 511      ( 25 Jan 2025 06:37 )             24.7     Mean Cell Volume : 90.1 fl  Mean Cell Hemoglobin : 29.2 pg  Mean Cell Hemoglobin Concentration : 32.4 g/dL  Auto Neutrophil # : 8.08 K/uL  Auto Lymphocyte # : 0.08 K/uL  Auto Monocyte # : 0.15 K/uL  Auto Eosinophil # : 0.00 K/uL  Auto Basophil # : 0.00 K/uL  Auto Neutrophil % : 93.8 %  Auto Lymphocyte % : 0.9 %  Auto Monocyte % : 1.8 %  Auto Eosinophil % : 0.0 %  Auto Basophil % : 0.0 %    01-25    129[L]  |  95[L]  |  35[H]  ----------------------------<  448[H]  4.8   |  22  |  1.05    Ca    9.0      25 Jan 2025 06:37  Phos  4.3     01-25  Mg     2.2     01-25    TPro  5.4[L]  /  Alb  2.2[L]  /  TBili  0.2  /  DBili  x   /  AST  54[H]  /  ALT  40  /  AlkPhos  95  01-25      Uric Acid 6.6    --------------------    Cultures:  Culture - Blood (01.24.25 @ 00:46)    Specimen Source: .Blood BLOOD   Culture Results:   No growth at 24 hours    Culture - Blood (01.24.25 @ 00:18)    Specimen Source: .Blood BLOOD   Culture Results:   No growth at 24 hours    Culture - Urine (01.22.25 @ 05:05)    Specimen Source: Clean Catch Clean Catch (Midstream)   Culture Results:   10,000 - 49,000 CFU/mL Candida albicans  "Susceptibilities not performed"    Fungitell (01.24.25 @ 00:46)    Fungitell: >500: Interpretation: The Fungitell assay does not detect certain fungal  species such as the genus Cryptococcus (Gerardo et al. 1991) which  produces very low levels of (1-3)-Beta-D-Glucan. The assay also does  not detect the Zygomycetes such as Absidia, Mucor and Rhizopus  (Hanna et al. 1994) which are not known to produce  (1-3)-Beta-D-Glucan. In addition, the yeast phase of Blastomyces  dermatitidis produces little (1-3)-Beta-D-Glucan and may not be  detected by the assay (Randy et al. 2007).  Reference Range:  Less than 60 pg/mL. Glucan values of less than 60 pg/mL are  interpreted as negative.  Glucan values of 60 to 79 pg/mL are interpreted as indeterminate,  and suggest a possible fungal infection. Additional sampling and  testing of sera is required to interpret the results.  Glucan values of greater than or equal to 80 pg/mL are interpreted  as positive.        ----------------    RADIOLOGY & ADDITIONAL STUDIES:  from: CT Abdomen and Pelvis No Cont (01.23.25 @ 17:07)   PROCEDURE:  CT of the Chest, Abdomen and Pelvis was performed.  Sagittal and coronal reformats were performed.    FINDINGS:  CHEST:  LUNGS AND LARGE AIRWAYS: Patent central airways. Diffuse bilateral   predominantly perihilar alveolar opacities. Left base atelectasis  PLEURA: Small left effusion and tiny right effusion  VESSELS: Within normal limits.  HEART:Heart size is normal. No pericardial effusion.  MEDIASTINUM AND PATRICE: No lymphadenopathy.  CHEST WALL AND LOWER NECK: Within normal limits.    ABDOMEN AND PELVIS:  LIVER: 3.2 cm right hepatic lobe hypodensity without significant change.  BILE DUCTS: Normal caliber.  GALLBLADDER: Cholecystectomy.  SPLEEN: Within normal limits.  PANCREAS: Within normal limits.  ADRENALS: Within normal limits.  KIDNEYS/URETERS: Left nephrectomy and atrophic right kidney with 1.4 cm   indeterminate lesion in the interpolar region, unchanged. Right lower   quadrant renal transplant.    BLADDER: Within normal limits.  REPRODUCTIVE ORGANS: Prostate within normal limits.    BOWEL: No bowel obstruction. Appendix is not visualized.  PERITONEUM/RETROPERITONEUM: Unchanged retroperitoneal soft tissue   encasing the aorta and IVC.  VESSELS: Within normal limits.  LYMPH NODES: No lymphadenopathy.  ABDOMINAL WALL: Ventral abdominal wall postsurgical changes with an   unchanged 3.7 cm partially calcified fat and soft tissue containing   collection (3-270).  BONES: Diffuse erosive changes of the sacrum with soft tissue   infiltration, similar in appearance to prior. Sclerotic focus in the left   anterolateral seventh rib. Chronic rib fractures. Chronic left humeral   fracture. Left hip replacement    IMPRESSION:    Diffuse bilateral patchy groundglass opacities with central predominance,   increased from 1/17/2025, may represent edema or pneumonia.  Unchanged left lower lobe round atelectasis.  Small left pleural effusion.  Unchanged retroperitoneal soft tissue encasing the aorta and IVC.  Diffuse erosive changes of the sacrum with soft tissue infiltration,   similar in appearance to prior, which could represent osteomyelitis.

## 2025-01-25 NOTE — PROGRESS NOTE ADULT - PROBLEM SELECTOR PLAN 1
Admitted  for R mini CHOP, found to be febrile on admission hold  chemo   Monitor CBC with diff, transfuse as needed.  Monitor electrolytes, replete as needed.  Daily weights.  Strict I/O. mouth care  Plans for any chemo or tafasitamab/lenalidomide are now ON HOLD given severe infections and fevers. Admitted  for R mini CHOP, found to be febrile on admission hold  chemo   Monitor CBC with diff, transfuse as needed.  Monitor electrolytes, replete as needed.  Daily weights.  Strict I/O. mouth care  Plans for any chemo or tafasitamab/lenalidomide are now ON HOLD given severe infections and fevers.  1/25 stable on HFNC. 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

## 2025-01-25 NOTE — PROGRESS NOTE ADULT - NS ATTEND AMEND GEN_ALL_CORE FT
.  Primary: [place]    Vital Signs Last 24 Hrs  T(C): 36.3 (25 Jan 2025 05:05), Max: 37.5 (24 Jan 2025 06:52)  T(F): 97.3 (25 Jan 2025 05:05), Max: 99.5 (24 Jan 2025 06:52)  HR: 67 (25 Jan 2025 05:05) (63 - 85)  BP: 149/64 (25 Jan 2025 05:05) (104/63 - 149/64)  BP(mean): --  RR: 20 (25 Jan 2025 05:05) (16 - 20)  SpO2: 100% (25 Jan 2025 05:05) (89% - 100%)    Parameters below as of 25 Jan 2025 05:05  Patient On (Oxygen Delivery Method): nasal cannula, high flow  O2 Flow (L/min): 60  O2 Concentration (%): 100    MEDICATIONS  (STANDING):  alteplase for catheter clearance 2 milliGRAM(s) Catheter once  alteplase for catheter clearance 2 milliGRAM(s) Catheter once  amLODIPine   Tablet 10 milliGRAM(s) Oral daily  buPROPion XL (24-Hour) . 300 milliGRAM(s) Oral daily  carvedilol 12.5 milliGRAM(s) Oral every 12 hours  chlorhexidine 4% Liquid 1 Application(s) Topical <User Schedule>  cloNIDine 0.1 milliGRAM(s) Oral three times a day  dextrose 5%. 1000 milliLiter(s) (50 mL/Hr) IV Continuous <Continuous>  dextrose 5%. 1000 milliLiter(s) (100 mL/Hr) IV Continuous <Continuous>  dextrose 50% Injectable 25 Gram(s) IV Push once  dextrose 50% Injectable 12.5 Gram(s) IV Push once  dextrose 50% Injectable 25 Gram(s) IV Push once  enoxaparin Injectable 40 milliGRAM(s) SubCutaneous <User Schedule>  escitalopram 10 milliGRAM(s) Oral daily  glucagon  Injectable 1 milliGRAM(s) IntraMuscular once  glucagon  Injectable 1 milliGRAM(s) IntraMuscular once  hydrALAZINE 100 milliGRAM(s) Oral every 8 hours  insulin glargine Injectable (LANTUS) 24 Unit(s) SubCutaneous at bedtime  insulin lispro (ADMELOG) corrective regimen sliding scale   SubCutaneous at bedtime  insulin lispro (ADMELOG) corrective regimen sliding scale   SubCutaneous three times a day before meals  insulin lispro Injectable (ADMELOG) 8 Unit(s) SubCutaneous before lunch  insulin lispro Injectable (ADMELOG) 8 Unit(s) SubCutaneous before dinner  insulin lispro Injectable (ADMELOG) 8 Unit(s) SubCutaneous before breakfast  lactulose Syrup 15 Gram(s) Oral <User Schedule>  lactulose Syrup 10 Gram(s) Oral every 6 hours  levothyroxine 88 MICROGram(s) Oral daily  lisinopril 10 milliGRAM(s) Oral daily  meropenem  IVPB 1000 milliGRAM(s) IV Intermittent every 8 hours  mupirocin 2% Nasal 1 Application(s) Both Nostrils two times a day  pantoprazole   Suspension 40 milliGRAM(s) Oral daily  polyethylene glycol 3350 17 Gram(s) Oral at bedtime  predniSONE   Tablet 40 milliGRAM(s) Oral two times a day  rosuvastatin 10 milliGRAM(s) Oral at bedtime  senna 2 Tablet(s) Oral at bedtime  tacrolimus 1 milliGRAM(s) Oral <User Schedule>  tacrolimus 1 milliGRAM(s) Oral at bedtime  trimethoprim / sulfamethoxazole IVPB 320 milliGRAM(s) IV Intermittent every 8 hours      wife (Ludmila), daughter (Luz Marina) and son (Jose J).    Assessment: Piero was [facility] for day 1 cycle 2 mini RCH(O)P for PTLD but suffered from urosepsis with Klebsiella ESBL (Zosyn sensitive) and nasal corona virus.   Course further complicated by sustained high fevers & based on CT higher concern for PCP    Cystatin C eGFR: 40; standard eGFR calculation: 96    PMHx::  1) renal transplant 2012: left nephrectomy; renal transplant was secondary to DM, 2) AODM, 3) HLD, 4) hypothyroidism, 5) peripheral neuropathy  6) retroperitoneal fibrosis, 7) GERD, 8) HTN, 9) anxiety/depression, 10) obstructive sleep apnea, 11) osteomyelitis and E coli urosepsis (12/1/24 - 12/18/24)    Plan:  Heme:  Hold planned chemotherapy. given toxic presentation and sustained high fevers unlikely to continue anthracycline-based treatment.   Approved for Tafa/REV but given PCP Dx hold this therapy too.       ID:  mick (1/24/25 - )  For presumed PCP: prednisone 40mg bid (1/24/25), bactrim 320mg/q8: follow renal function given Cystatin C eGFR  galactomannan  and B-D glucan an LDH are pending - for PCP evaluation.  Please start valtrex given high dose steroids   zosyn (1/17/25 - 1/20/25)  ertapenum (1/20/25 - 1/24/25)    Endo: as clinically well and fevers resolved: continue day +2 hydrocortisone 25mg IV q8 x 24 hours - likely will change to PCP dosing.     Radiographs: Last chest/abd pelvis: 1/17/25, pelvic radiographs: pending; may also require MRI for avascular necrosis   Chest (1/23/25): concerning for PCP (personal read)     Renal: continue /pred -- pred 5mg bid (50% dose reduction) 1/18/25  AT RISK FOR ADRENAL INSUFFIENCEY IF HYPOTENSIVE 50mg hydrocortisone IV q8     PM&R consulted: for inability to weight bare.    Social: need to discuss treatment with Tafa/Rev.  His current rehab facility will not allow for transport to outpatient oncology to treat his lymphoma.    DVT prophylaxis: LWHx     FULL CODE    Over 60 minutes were spent in direct patient care and care coordination. .  Primary: [place]    Vital Signs Last 24 Hrs  T(C): 36.3 (25 Jan 2025 05:05), Max: 37.5 (24 Jan 2025 06:52)  T(F): 97.3 (25 Jan 2025 05:05), Max: 99.5 (24 Jan 2025 06:52)  HR: 67 (25 Jan 2025 05:05) (63 - 85)  BP: 149/64 (25 Jan 2025 05:05) (104/63 - 149/64)  BP(mean): --  RR: 20 (25 Jan 2025 05:05) (16 - 20)  SpO2: 100% (25 Jan 2025 05:05) (89% - 100%)    Parameters below as of 25 Jan 2025 05:05  Patient On (Oxygen Delivery Method): nasal cannula, high flow  O2 Flow (L/min): 60  O2 Concentration (%): 100    MEDICATIONS  (STANDING):  alteplase for catheter clearance 2 milliGRAM(s) Catheter once  alteplase for catheter clearance 2 milliGRAM(s) Catheter once  amLODIPine   Tablet 10 milliGRAM(s) Oral daily  buPROPion XL (24-Hour) . 300 milliGRAM(s) Oral daily  carvedilol 12.5 milliGRAM(s) Oral every 12 hours  chlorhexidine 4% Liquid 1 Application(s) Topical <User Schedule>  cloNIDine 0.1 milliGRAM(s) Oral three times a day  dextrose 5%. 1000 milliLiter(s) (50 mL/Hr) IV Continuous <Continuous>  dextrose 5%. 1000 milliLiter(s) (100 mL/Hr) IV Continuous <Continuous>  dextrose 50% Injectable 25 Gram(s) IV Push once  dextrose 50% Injectable 12.5 Gram(s) IV Push once  dextrose 50% Injectable 25 Gram(s) IV Push once  enoxaparin Injectable 40 milliGRAM(s) SubCutaneous <User Schedule>  escitalopram 10 milliGRAM(s) Oral daily  glucagon  Injectable 1 milliGRAM(s) IntraMuscular once  glucagon  Injectable 1 milliGRAM(s) IntraMuscular once  hydrALAZINE 100 milliGRAM(s) Oral every 8 hours  insulin glargine Injectable (LANTUS) 24 Unit(s) SubCutaneous at bedtime  insulin lispro (ADMELOG) corrective regimen sliding scale   SubCutaneous at bedtime  insulin lispro (ADMELOG) corrective regimen sliding scale   SubCutaneous three times a day before meals  insulin lispro Injectable (ADMELOG) 8 Unit(s) SubCutaneous before lunch  insulin lispro Injectable (ADMELOG) 8 Unit(s) SubCutaneous before dinner  insulin lispro Injectable (ADMELOG) 8 Unit(s) SubCutaneous before breakfast  lactulose Syrup 15 Gram(s) Oral <User Schedule>  lactulose Syrup 10 Gram(s) Oral every 6 hours  levothyroxine 88 MICROGram(s) Oral daily  lisinopril 10 milliGRAM(s) Oral daily  meropenem  IVPB 1000 milliGRAM(s) IV Intermittent every 8 hours  mupirocin 2% Nasal 1 Application(s) Both Nostrils two times a day  pantoprazole   Suspension 40 milliGRAM(s) Oral daily  polyethylene glycol 3350 17 Gram(s) Oral at bedtime  predniSONE   Tablet 40 milliGRAM(s) Oral two times a day  rosuvastatin 10 milliGRAM(s) Oral at bedtime  senna 2 Tablet(s) Oral at bedtime  tacrolimus 1 milliGRAM(s) Oral <User Schedule>  tacrolimus 1 milliGRAM(s) Oral at bedtime  trimethoprim / sulfamethoxazole IVPB 320 milliGRAM(s) IV Intermittent every 8 hours      wife (Ludmila), daughter (Luz Marina) and son (Joes J).    Assessment: Pier[facility] day 1 cycle 2 mini RCH(O)P for PTLD but suffered from urosepsis with Klebsiella ESBL (Zosyn sensitive) and nasal corona virus.   Course further complicated by sustained high fevers & based on CT higher concern for PCP    Cystatin C eGFR: 40; standard eGFR calculation: 96    PMHx::  1) renal transplant 2012: left nephrectomy; renal transplant was secondary to DM, 2) AODM, 3) HLD, 4) hypothyroidism, 5) peripheral neuropathy  6) retroperitoneal fibrosis, 7) GERD, 8) HTN, 9) anxiety/depression, 10) obstructive sleep apnea, 11) osteomyelitis and E coli urosepsis (12/1/24 - 12/18/24)    Plan:  Heme:  Hold planned chemotherapy. given toxic presentation and sustained high fevers unlikely to continue anthracycline-based treatment.   Approved for Tafa/REV but given PCP Dx hold this therapy too.       ID:  mick (1/24/25 - )  For presumed PCP: prednisone 40mg bid (1/24/25), bactrim 320mg/q8: follow renal function given Cystatin C eGFR  galactomannan  and B-D glucan an LDH are pending - for PCP evaluation.  Please start valtrex given high dose steroids   zosyn (1/17/25 - 1/20/25)  ertapenum (1/20/25 - 1/24/25)    Radiographs: Last chest/abd pelvis: 1/17/25, pelvic radiographs: pending; may also require MRI for avascular necrosis   Chest (1/23/25): concerning for PCP    Renal: continue      PM&R consulted: for inability to weight bare.    DVT prophylaxis: LWHx     FULL CODE    Over 60 minutes were spent in direct patient care and care coordination.

## 2025-01-25 NOTE — PROGRESS NOTE ADULT - PROBLEM SELECTOR PLAN 2
Not neutropenic. febrile   1/17- F/u Flu/COVID PCR , + for Coronavirus   (last hospitalization: osteomyelitis and E coli urosepsis (12/1/24 - 12/18/24)   [facility] (12/18/24): for osteomyelitis & E coli urosepsis).  1/18 - Oral candidiasis - Nystatin S/S  1/19- ID consult, followed by [facility], Zosyn changed to Ertapenem  history of ESBL Kleb in Ucx 12/31/24, ESBL E.coli 11/29/24 Ucx  1/24 - CT chest with c/f new PCP pneumonia as well as unchanged sacral OM. ID recs DC IV vanc, switch IV erta to IV mick given hypoalbuminemia efficacy concerns and start IV bactrim for empiric PCP PNA treatment as well as steroid taper pred 40mg BID x5 days followed by pred 40mg QD x5d then 20mg QD.   - Interventional pulm consulted for bronch  [ ] f/u fungitell, galactomannan, sputum PCP PCR Not neutropenic. febrile   1/17- F/u Flu/COVID PCR , + for Coronavirus   (last hospitalization: osteomyelitis and E coli urosepsis (12/1/24 - 12/18/24)   [facility] (12/18/24): for osteomyelitis & E coli urosepsis).  1/18 - Oral candidiasis - Nystatin S/S  1/19- ID consult, followed by [facility], Zosyn changed to Ertapenem  history of ESBL Kleb in Ucx 12/31/24, ESBL E.coli 11/29/24 Ucx  1/24 - CT chest with c/f new PCP pneumonia as well as unchanged sacral OM. ID recs DC IV vanc, switch IV erta to IV mick given hypoalbuminemia efficacy concerns and start IV bactrim for empiric PCP PNA treatment as well as steroid taper pred 40mg BID x5 days followed by pred 40mg QD x5d then 20mg QD.   1/24 fungitell high > 500; pending galactoman, beta D glucan

## 2025-01-25 NOTE — PROGRESS NOTE ADULT - SUBJECTIVE AND OBJECTIVE BOX
PATIENT:  JAN CALVIN  [id number]    CHIEF COMPLAINT:  Patient is a 69y old  Male who presents with a chief complaint of "For chemo" (24 Jan 2025 08:35)    INTERVAL HISTORY/OVERNIGHT EVENTS:  No acute interval events. No new symptoms or respiratory complaints. Pt remains on HFNC 60/100, O2 saturation 99%. Decreased to 55L/90%, O2 saturation decreased to 94%    MEDICATIONS:  MEDICATIONS  (STANDING):  alteplase for catheter clearance 2 milliGRAM(s) Catheter once  alteplase for catheter clearance 2 milliGRAM(s) Catheter once  amLODIPine   Tablet 10 milliGRAM(s) Oral daily  buPROPion XL (24-Hour) . 300 milliGRAM(s) Oral daily  carvedilol 12.5 milliGRAM(s) Oral every 12 hours  chlorhexidine 4% Liquid 1 Application(s) Topical <User Schedule>  cloNIDine 0.1 milliGRAM(s) Oral three times a day  dextrose 5%. 1000 milliLiter(s) (50 mL/Hr) IV Continuous <Continuous>  dextrose 5%. 1000 milliLiter(s) (100 mL/Hr) IV Continuous <Continuous>  dextrose 50% Injectable 25 Gram(s) IV Push once  dextrose 50% Injectable 12.5 Gram(s) IV Push once  dextrose 50% Injectable 25 Gram(s) IV Push once  enoxaparin Injectable 40 milliGRAM(s) SubCutaneous <User Schedule>  escitalopram 10 milliGRAM(s) Oral daily  glucagon  Injectable 1 milliGRAM(s) IntraMuscular once  glucagon  Injectable 1 milliGRAM(s) IntraMuscular once  hydrALAZINE 100 milliGRAM(s) Oral every 8 hours  insulin glargine Injectable (LANTUS) 24 Unit(s) SubCutaneous at bedtime  insulin lispro (ADMELOG) corrective regimen sliding scale   SubCutaneous three times a day before meals  insulin lispro (ADMELOG) corrective regimen sliding scale   SubCutaneous at bedtime  insulin lispro Injectable (ADMELOG) 8 Unit(s) SubCutaneous before breakfast  insulin lispro Injectable (ADMELOG) 8 Unit(s) SubCutaneous before lunch  insulin lispro Injectable (ADMELOG) 8 Unit(s) SubCutaneous before dinner  lactulose Syrup 15 Gram(s) Oral <User Schedule>  lactulose Syrup 10 Gram(s) Oral every 6 hours  levothyroxine 88 MICROGram(s) Oral daily  lisinopril 10 milliGRAM(s) Oral daily  meropenem  IVPB 1000 milliGRAM(s) IV Intermittent every 8 hours  mupirocin 2% Nasal 1 Application(s) Both Nostrils two times a day  pantoprazole   Suspension 40 milliGRAM(s) Oral daily  polyethylene glycol 3350 17 Gram(s) Oral at bedtime  predniSONE   Tablet 40 milliGRAM(s) Oral two times a day  rosuvastatin 10 milliGRAM(s) Oral at bedtime  senna 2 Tablet(s) Oral at bedtime  tacrolimus 1 milliGRAM(s) Oral <User Schedule>  tacrolimus 1 milliGRAM(s) Oral at bedtime  trimethoprim / sulfamethoxazole IVPB 320 milliGRAM(s) IV Intermittent every 8 hours    MEDICATIONS  (PRN):  acetaminophen     Tablet .. 650 milliGRAM(s) Oral every 6 hours PRN Temp greater or equal to 38C (100.4F), Mild Pain (1 - 3)  dextrose Oral Gel 15 Gram(s) Oral once PRN Blood Glucose LESS THAN 70 milliGRAM(s)/deciliter  polyethylene glycol 3350 17 Gram(s) Oral daily PRN for constipation  sodium chloride 0.9% lock flush 10 milliLiter(s) IV Push every 1 hour PRN Pre/post blood products, medications, blood draw, and to maintain line patency      ALLERGIES:  Allergies    No Known Allergies    Intolerances        OBJECTIVE:  ICU Vital Signs Last 24 Hrs  T(C): 36.6 (24 Jan 2025 09:39), Max: 38.8 (23 Jan 2025 16:52)  T(F): 97.9 (24 Jan 2025 09:39), Max: 101.8 (23 Jan 2025 16:52)  HR: 85 (24 Jan 2025 09:48) (72 - 100)  BP: 104/63 (24 Jan 2025 09:39) (104/63 - 175/74)  RR: 20 (24 Jan 2025 09:48) (18 - 20)  SpO2: 92% (24 Jan 2025 09:48) (81% - 97%)    O2 Parameters below as of 24 Jan 2025 09:48  Patient On (Oxygen Delivery Method): nasal cannula, high flow  O2 Flow (L/min): 60  O2 Concentration (%): 100      POCT Blood Glucose.: 206 mg/dL (24 Jan 2025 12:23)  POCT Blood Glucose.: 94 mg/dL (24 Jan 2025 09:54)  POCT Blood Glucose.: 92 mg/dL (24 Jan 2025 09:24)  POCT Blood Glucose.: 26 mg/dL (24 Jan 2025 08:37)  POCT Blood Glucose.: 27 mg/dL (24 Jan 2025 08:34)  POCT Blood Glucose.: 142 mg/dL (23 Jan 2025 21:52)  POCT Blood Glucose.: 134 mg/dL (23 Jan 2025 18:53)  POCT Blood Glucose.: 206 mg/dL (24 Jan 2025 12:23)    I&O's Summary    23 Jan 2025 07:01  -  24 Jan 2025 07:00  --------------------------------------------------------  IN: 2131 mL / OUT: 2925 mL / NET: -794 mL    24 Jan 2025 07:01  -  24 Jan 2025 13:52  --------------------------------------------------------  IN: 180 mL / OUT: 400 mL / NET: -220 mL    PHYSICAL EXAMINATION:  General: WN/WD NAD  HEENT: PERRL, EOMI, moist mucous membranes  Neurology: A&Ox3, nonfocal, GARDNER x 4  Respiratory: crackles  CV: RRR, S1S2, no murmurs, rubs or gallops  Abdominal: Soft, NT, ND +BS  Extremities: No edema, + peripheral pulses    LABS:                          8.5    10.35 )-----------( 693      ( 24 Jan 2025 07:30 )             26.3     01-24    132[L]  |  97  |  26[H]  ----------------------------<  21[LL]  4.5   |  22  |  0.93    Ca    9.1      24 Jan 2025 07:23  Phos  3.1     01-24  Mg     1.6     01-24    TPro  5.6[L]  /  Alb  2.3[L]  /  TBili  0.3  /  DBili  x   /  AST  64[H]  /  ALT  43  /  AlkPhos  96  01-24    LIVER FUNCTIONS - ( 24 Jan 2025 07:23 )  Alb: 2.3 g/dL / Pro: 5.6 g/dL / ALK PHOS: 96 U/L / ALT: 43 U/L / AST: 64 U/L / GGT: x             Urinalysis Basic - ( 24 Jan 2025 07:23 )    Color: x / Appearance: x / SG: x / pH: x  Gluc: 21 mg/dL / Ketone: x  / Bili: x / Urobili: x   Blood: x / Protein: x / Nitrite: x   Leuk Esterase: x / RBC: x / WBC x   Sq Epi: x / Non Sq Epi: x / Bacteria: x

## 2025-01-26 DIAGNOSIS — R45.1 RESTLESSNESS AND AGITATION: ICD-10-CM

## 2025-01-26 DIAGNOSIS — R73.9 HYPERGLYCEMIA, UNSPECIFIED: ICD-10-CM

## 2025-01-26 DIAGNOSIS — Z78.9 OTHER SPECIFIED HEALTH STATUS: ICD-10-CM

## 2025-01-26 LAB
ALBUMIN SERPL ELPH-MCNC: 2.2 G/DL — LOW (ref 3.3–5)
ALBUMIN SERPL ELPH-MCNC: 2.4 G/DL — LOW (ref 3.3–5)
ALBUMIN SERPL ELPH-MCNC: 2.5 G/DL — LOW (ref 3.3–5)
ALP SERPL-CCNC: 102 U/L — SIGNIFICANT CHANGE UP (ref 40–120)
ALP SERPL-CCNC: 95 U/L — SIGNIFICANT CHANGE UP (ref 40–120)
ALP SERPL-CCNC: 96 U/L — SIGNIFICANT CHANGE UP (ref 40–120)
ALT FLD-CCNC: 37 U/L — SIGNIFICANT CHANGE UP (ref 10–45)
ALT FLD-CCNC: 41 U/L — SIGNIFICANT CHANGE UP (ref 10–45)
ALT FLD-CCNC: 43 U/L — SIGNIFICANT CHANGE UP (ref 10–45)
ANION GAP SERPL CALC-SCNC: 10 MMOL/L — SIGNIFICANT CHANGE UP (ref 5–17)
ANION GAP SERPL CALC-SCNC: 12 MMOL/L — SIGNIFICANT CHANGE UP (ref 5–17)
ANION GAP SERPL CALC-SCNC: 13 MMOL/L — SIGNIFICANT CHANGE UP (ref 5–17)
AST SERPL-CCNC: 35 U/L — SIGNIFICANT CHANGE UP (ref 10–40)
AST SERPL-CCNC: 39 U/L — SIGNIFICANT CHANGE UP (ref 10–40)
AST SERPL-CCNC: 43 U/L — HIGH (ref 10–40)
BASOPHILS # BLD AUTO: 0.02 K/UL — SIGNIFICANT CHANGE UP (ref 0–0.2)
BASOPHILS NFR BLD AUTO: 0.2 % — SIGNIFICANT CHANGE UP (ref 0–2)
BILIRUB SERPL-MCNC: 0.1 MG/DL — LOW (ref 0.2–1.2)
BILIRUB SERPL-MCNC: 0.1 MG/DL — LOW (ref 0.2–1.2)
BILIRUB SERPL-MCNC: 0.2 MG/DL — SIGNIFICANT CHANGE UP (ref 0.2–1.2)
BUN SERPL-MCNC: 36 MG/DL — HIGH (ref 7–23)
BUN SERPL-MCNC: 37 MG/DL — HIGH (ref 7–23)
BUN SERPL-MCNC: 38 MG/DL — HIGH (ref 7–23)
CALCIUM SERPL-MCNC: 9.2 MG/DL — SIGNIFICANT CHANGE UP (ref 8.4–10.5)
CALCIUM SERPL-MCNC: 9.6 MG/DL — SIGNIFICANT CHANGE UP (ref 8.4–10.5)
CALCIUM SERPL-MCNC: 9.8 MG/DL — SIGNIFICANT CHANGE UP (ref 8.4–10.5)
CHLORIDE SERPL-SCNC: 91 MMOL/L — LOW (ref 96–108)
CHLORIDE SERPL-SCNC: 92 MMOL/L — LOW (ref 96–108)
CHLORIDE SERPL-SCNC: 95 MMOL/L — LOW (ref 96–108)
CO2 SERPL-SCNC: 21 MMOL/L — LOW (ref 22–31)
CREAT SERPL-MCNC: 1.01 MG/DL — SIGNIFICANT CHANGE UP (ref 0.5–1.3)
CREAT SERPL-MCNC: 1.14 MG/DL — SIGNIFICANT CHANGE UP (ref 0.5–1.3)
CREAT SERPL-MCNC: 1.17 MG/DL — SIGNIFICANT CHANGE UP (ref 0.5–1.3)
EGFR: 68 ML/MIN/1.73M2 — SIGNIFICANT CHANGE UP
EGFR: 70 ML/MIN/1.73M2 — SIGNIFICANT CHANGE UP
EGFR: 82 ML/MIN/1.73M2 — SIGNIFICANT CHANGE UP
EOSINOPHIL # BLD AUTO: 0 K/UL — SIGNIFICANT CHANGE UP (ref 0–0.5)
EOSINOPHIL NFR BLD AUTO: 0 % — SIGNIFICANT CHANGE UP (ref 0–6)
GLUCOSE BLDC GLUCOMTR-MCNC: 238 MG/DL — HIGH (ref 70–99)
GLUCOSE BLDC GLUCOMTR-MCNC: 361 MG/DL — HIGH (ref 70–99)
GLUCOSE BLDC GLUCOMTR-MCNC: 381 MG/DL — HIGH (ref 70–99)
GLUCOSE BLDC GLUCOMTR-MCNC: 394 MG/DL — HIGH (ref 70–99)
GLUCOSE BLDC GLUCOMTR-MCNC: 440 MG/DL — HIGH (ref 70–99)
GLUCOSE BLDC GLUCOMTR-MCNC: 453 MG/DL — CRITICAL HIGH (ref 70–99)
GLUCOSE SERPL-MCNC: 366 MG/DL — HIGH (ref 70–99)
GLUCOSE SERPL-MCNC: 425 MG/DL — HIGH (ref 70–99)
GLUCOSE SERPL-MCNC: 471 MG/DL — CRITICAL HIGH (ref 70–99)
HCT VFR BLD CALC: 24.7 % — LOW (ref 39–50)
HGB BLD-MCNC: 8.1 G/DL — LOW (ref 13–17)
IMM GRANULOCYTES NFR BLD AUTO: 1.7 % — HIGH (ref 0–0.9)
LDH SERPL L TO P-CCNC: 505 U/L — HIGH (ref 50–242)
LDH SERPL L TO P-CCNC: 508 U/L — HIGH (ref 50–242)
LDH SERPL L TO P-CCNC: 567 U/L — HIGH (ref 50–242)
LYMPHOCYTES # BLD AUTO: 0.09 K/UL — LOW (ref 1–3.3)
LYMPHOCYTES # BLD AUTO: 0.8 % — LOW (ref 13–44)
MAGNESIUM SERPL-MCNC: 1.8 MG/DL — SIGNIFICANT CHANGE UP (ref 1.6–2.6)
MAGNESIUM SERPL-MCNC: 1.8 MG/DL — SIGNIFICANT CHANGE UP (ref 1.6–2.6)
MAGNESIUM SERPL-MCNC: 1.9 MG/DL — SIGNIFICANT CHANGE UP (ref 1.6–2.6)
MCHC RBC-ENTMCNC: 28.8 PG — SIGNIFICANT CHANGE UP (ref 27–34)
MCHC RBC-ENTMCNC: 32.8 G/DL — SIGNIFICANT CHANGE UP (ref 32–36)
MCV RBC AUTO: 87.9 FL — SIGNIFICANT CHANGE UP (ref 80–100)
MONOCYTES # BLD AUTO: 0.26 K/UL — SIGNIFICANT CHANGE UP (ref 0–0.9)
MONOCYTES NFR BLD AUTO: 2.2 % — SIGNIFICANT CHANGE UP (ref 2–14)
NEUTROPHILS # BLD AUTO: 11.12 K/UL — HIGH (ref 1.8–7.4)
NEUTROPHILS NFR BLD AUTO: 95.1 % — HIGH (ref 43–77)
NRBC # BLD: 0 /100 WBCS — SIGNIFICANT CHANGE UP (ref 0–0)
NRBC BLD-RTO: 0 /100 WBCS — SIGNIFICANT CHANGE UP (ref 0–0)
NT-PROBNP SERPL-SCNC: 613 PG/ML — HIGH (ref 0–300)
PHOSPHATE SERPL-MCNC: 2.5 MG/DL — SIGNIFICANT CHANGE UP (ref 2.5–4.5)
PHOSPHATE SERPL-MCNC: 2.8 MG/DL — SIGNIFICANT CHANGE UP (ref 2.5–4.5)
PHOSPHATE SERPL-MCNC: 2.8 MG/DL — SIGNIFICANT CHANGE UP (ref 2.5–4.5)
PLATELET # BLD AUTO: 552 K/UL — HIGH (ref 150–400)
POTASSIUM SERPL-MCNC: 5.5 MMOL/L — HIGH (ref 3.5–5.3)
POTASSIUM SERPL-SCNC: 5.5 MMOL/L — HIGH (ref 3.5–5.3)
PROT SERPL-MCNC: 5.2 G/DL — LOW (ref 6–8.3)
PROT SERPL-MCNC: 5.6 G/DL — LOW (ref 6–8.3)
PROT SERPL-MCNC: 5.6 G/DL — LOW (ref 6–8.3)
RBC # BLD: 2.81 M/UL — LOW (ref 4.2–5.8)
RBC # FLD: 18.8 % — HIGH (ref 10.3–14.5)
SODIUM SERPL-SCNC: 124 MMOL/L — LOW (ref 135–145)
SODIUM SERPL-SCNC: 126 MMOL/L — LOW (ref 135–145)
SODIUM SERPL-SCNC: 126 MMOL/L — LOW (ref 135–145)
URATE SERPL-MCNC: 5.4 MG/DL — SIGNIFICANT CHANGE UP (ref 3.4–8.8)
URATE SERPL-MCNC: 6.1 MG/DL — SIGNIFICANT CHANGE UP (ref 3.4–8.8)
URATE SERPL-MCNC: 6.2 MG/DL — SIGNIFICANT CHANGE UP (ref 3.4–8.8)
WBC # BLD: 11.69 K/UL — HIGH (ref 3.8–10.5)
WBC # FLD AUTO: 11.69 K/UL — HIGH (ref 3.8–10.5)

## 2025-01-26 PROCEDURE — 99222 1ST HOSP IP/OBS MODERATE 55: CPT | Mod: GC

## 2025-01-26 PROCEDURE — 99233 SBSQ HOSP IP/OBS HIGH 50: CPT | Mod: FS

## 2025-01-26 PROCEDURE — 99223 1ST HOSP IP/OBS HIGH 75: CPT

## 2025-01-26 PROCEDURE — 99233 SBSQ HOSP IP/OBS HIGH 50: CPT

## 2025-01-26 RX ORDER — INSULIN LISPRO 100/ML
VIAL (ML) SUBCUTANEOUS AT BEDTIME
Refills: 0 | Status: DISCONTINUED | OUTPATIENT
Start: 2025-01-26 | End: 2025-01-26

## 2025-01-26 RX ORDER — SODIUM CHLORIDE 9 G/ML
1000 INJECTION, SOLUTION INTRAVENOUS
Refills: 0 | Status: DISCONTINUED | OUTPATIENT
Start: 2025-01-26 | End: 2025-02-06

## 2025-01-26 RX ORDER — INSULIN LISPRO 100/ML
VIAL (ML) SUBCUTANEOUS AT BEDTIME
Refills: 0 | Status: DISCONTINUED | OUTPATIENT
Start: 2025-01-26 | End: 2025-01-29

## 2025-01-26 RX ORDER — SODIUM ZIRCONIUM CYCLOSILICATE 5 G/5G
5 POWDER, FOR SUSPENSION ORAL ONCE
Refills: 0 | Status: COMPLETED | OUTPATIENT
Start: 2025-01-26 | End: 2025-01-26

## 2025-01-26 RX ORDER — HALOPERIDOL 10 MG/1
1 TABLET ORAL ONCE
Refills: 0 | Status: COMPLETED | OUTPATIENT
Start: 2025-01-26 | End: 2025-01-26

## 2025-01-26 RX ORDER — INSULIN LISPRO 100/ML
VIAL (ML) SUBCUTANEOUS
Refills: 0 | Status: DISCONTINUED | OUTPATIENT
Start: 2025-01-26 | End: 2025-02-06

## 2025-01-26 RX ORDER — DM/PSEUDOEPHED/ACETAMINOPHEN 10-30-250
25 CAPSULE ORAL ONCE
Refills: 0 | Status: DISCONTINUED | OUTPATIENT
Start: 2025-01-26 | End: 2025-01-26

## 2025-01-26 RX ORDER — DM/PSEUDOEPHED/ACETAMINOPHEN 10-30-250
15 CAPSULE ORAL ONCE
Refills: 0 | Status: DISCONTINUED | OUTPATIENT
Start: 2025-01-26 | End: 2025-02-06

## 2025-01-26 RX ORDER — INSULIN GLARGINE-YFGN 100 [IU]/ML
24 INJECTION, SOLUTION SUBCUTANEOUS AT BEDTIME
Refills: 0 | Status: DISCONTINUED | OUTPATIENT
Start: 2025-01-26 | End: 2025-01-26

## 2025-01-26 RX ORDER — INSULIN LISPRO 100/ML
VIAL (ML) SUBCUTANEOUS EVERY 4 HOURS
Refills: 0 | Status: DISCONTINUED | OUTPATIENT
Start: 2025-01-26 | End: 2025-01-26

## 2025-01-26 RX ORDER — BACTERIOSTATIC SODIUM CHLORIDE 0.9 %
1000 VIAL (ML) INJECTION
Refills: 0 | Status: DISCONTINUED | OUTPATIENT
Start: 2025-01-26 | End: 2025-01-29

## 2025-01-26 RX ORDER — INSULIN LISPRO 100/ML
10 VIAL (ML) SUBCUTANEOUS
Refills: 0 | Status: DISCONTINUED | OUTPATIENT
Start: 2025-01-26 | End: 2025-01-26

## 2025-01-26 RX ORDER — HALOPERIDOL 10 MG/1
0.5 TABLET ORAL ONCE
Refills: 0 | Status: COMPLETED | OUTPATIENT
Start: 2025-01-26 | End: 2025-01-26

## 2025-01-26 RX ORDER — INSULIN GLARGINE-YFGN 100 [IU]/ML
36 INJECTION, SOLUTION SUBCUTANEOUS AT BEDTIME
Refills: 0 | Status: DISCONTINUED | OUTPATIENT
Start: 2025-01-26 | End: 2025-01-27

## 2025-01-26 RX ORDER — GLUCAGON 3 MG/1
1 POWDER NASAL ONCE
Refills: 0 | Status: DISCONTINUED | OUTPATIENT
Start: 2025-01-26 | End: 2025-02-06

## 2025-01-26 RX ORDER — GLUCAGON 3 MG/1
1 POWDER NASAL ONCE
Refills: 0 | Status: DISCONTINUED | OUTPATIENT
Start: 2025-01-26 | End: 2025-01-26

## 2025-01-26 RX ORDER — INSULIN LISPRO 100/ML
VIAL (ML) SUBCUTANEOUS
Refills: 0 | Status: DISCONTINUED | OUTPATIENT
Start: 2025-01-26 | End: 2025-01-26

## 2025-01-26 RX ORDER — INSULIN LISPRO 100/ML
18 VIAL (ML) SUBCUTANEOUS
Refills: 0 | Status: DISCONTINUED | OUTPATIENT
Start: 2025-01-26 | End: 2025-01-27

## 2025-01-26 RX ADMIN — Medication 10 MILLIGRAM(S): at 06:32

## 2025-01-26 RX ADMIN — Medication 10: at 13:34

## 2025-01-26 RX ADMIN — Medication 10 UNIT(S): at 08:42

## 2025-01-26 RX ADMIN — Medication 10 GRAM(S): at 06:33

## 2025-01-26 RX ADMIN — TACROLIMUS 1 MILLIGRAM(S): 1 CAPSULE, GELATIN COATED ORAL at 08:27

## 2025-01-26 RX ADMIN — CLONIDINE HYDROCHLORIDE 0.1 MILLIGRAM(S): 0.2 TABLET ORAL at 06:31

## 2025-01-26 RX ADMIN — SODIUM ZIRCONIUM CYCLOSILICATE 5 GRAM(S): 5 POWDER, FOR SUSPENSION ORAL at 21:25

## 2025-01-26 RX ADMIN — INSULIN GLARGINE-YFGN 36 UNIT(S): 100 INJECTION, SOLUTION SUBCUTANEOUS at 23:24

## 2025-01-26 RX ADMIN — PREDNISONE 40 MILLIGRAM(S): 5 TABLET ORAL at 18:13

## 2025-01-26 RX ADMIN — Medication 2 TABLET(S): at 22:36

## 2025-01-26 RX ADMIN — SULFAMETHOXAZOLE AND TRIMETHOPRIM 280 MILLIGRAM(S): 400; 80 TABLET ORAL at 06:31

## 2025-01-26 RX ADMIN — Medication 10: at 17:39

## 2025-01-26 RX ADMIN — Medication 100 MILLIGRAM(S): at 14:44

## 2025-01-26 RX ADMIN — Medication 100 MILLIGRAM(S): at 06:31

## 2025-01-26 RX ADMIN — MEROPENEM 100 MILLIGRAM(S): 500 INJECTION INTRAVENOUS at 06:05

## 2025-01-26 RX ADMIN — Medication 100 MILLIGRAM(S): at 22:36

## 2025-01-26 RX ADMIN — ANTISEPTIC SURGICAL SCRUB 1 APPLICATION(S): 0.04 SOLUTION TOPICAL at 08:26

## 2025-01-26 RX ADMIN — VALACYCLOVIR 500 MILLIGRAM(S): 1000 TABLET ORAL at 18:13

## 2025-01-26 RX ADMIN — BUPROPION HYDROCHLORIDE 300 MILLIGRAM(S): 150 TABLET, EXTENDED RELEASE ORAL at 11:50

## 2025-01-26 RX ADMIN — HALOPERIDOL 1 MILLIGRAM(S): 10 TABLET ORAL at 16:59

## 2025-01-26 RX ADMIN — Medication 50 MILLILITER(S): at 16:04

## 2025-01-26 RX ADMIN — LEVOTHYROXINE SODIUM 88 MICROGRAM(S): 25 TABLET ORAL at 06:32

## 2025-01-26 RX ADMIN — HALOPERIDOL 0.5 MILLIGRAM(S): 10 TABLET ORAL at 13:32

## 2025-01-26 RX ADMIN — Medication 10 GRAM(S): at 00:10

## 2025-01-26 RX ADMIN — PREDNISONE 40 MILLIGRAM(S): 5 TABLET ORAL at 06:31

## 2025-01-26 RX ADMIN — SULFAMETHOXAZOLE AND TRIMETHOPRIM 280 MILLIGRAM(S): 400; 80 TABLET ORAL at 13:42

## 2025-01-26 RX ADMIN — ESCITALOPRAM 10 MILLIGRAM(S): 10 TABLET, FILM COATED ORAL at 11:50

## 2025-01-26 RX ADMIN — PANTOPRAZOLE 40 MILLIGRAM(S): 20 TABLET, DELAYED RELEASE ORAL at 11:51

## 2025-01-26 RX ADMIN — MUPIROCIN 1 APPLICATION(S): 2 CREAM TOPICAL at 18:13

## 2025-01-26 RX ADMIN — CLONIDINE HYDROCHLORIDE 0.1 MILLIGRAM(S): 0.2 TABLET ORAL at 14:44

## 2025-01-26 RX ADMIN — Medication 1 DROP(S): at 18:13

## 2025-01-26 RX ADMIN — VALACYCLOVIR 500 MILLIGRAM(S): 1000 TABLET ORAL at 06:32

## 2025-01-26 RX ADMIN — ENOXAPARIN SODIUM 40 MILLIGRAM(S): 100 INJECTION SUBCUTANEOUS at 22:36

## 2025-01-26 RX ADMIN — Medication 15 GRAM(S): at 22:36

## 2025-01-26 RX ADMIN — ROSUVASTATIN CALCIUM 10 MILLIGRAM(S): 10 TABLET, FILM COATED ORAL at 22:36

## 2025-01-26 RX ADMIN — Medication 10 GRAM(S): at 11:50

## 2025-01-26 RX ADMIN — Medication 12: at 08:42

## 2025-01-26 RX ADMIN — Medication 10 GRAM(S): at 23:38

## 2025-01-26 RX ADMIN — Medication 50 MILLILITER(S): at 22:37

## 2025-01-26 RX ADMIN — SULFAMETHOXAZOLE AND TRIMETHOPRIM 280 MILLIGRAM(S): 400; 80 TABLET ORAL at 22:37

## 2025-01-26 RX ADMIN — Medication 1 DROP(S): at 23:38

## 2025-01-26 RX ADMIN — CLONIDINE HYDROCHLORIDE 0.1 MILLIGRAM(S): 0.2 TABLET ORAL at 22:36

## 2025-01-26 RX ADMIN — Medication 12.5 MILLIGRAM(S): at 06:32

## 2025-01-26 RX ADMIN — MUPIROCIN 1 APPLICATION(S): 2 CREAM TOPICAL at 06:32

## 2025-01-26 RX ADMIN — Medication 10 GRAM(S): at 18:14

## 2025-01-26 NOTE — PROVIDER CONTACT NOTE (OTHER) - SITUATION
pt temp 100.7
pt. found to be febrile to 101.4 and pt. was 68% O2 on 1L O2
s/p RCHOP
pt requesting pain meds
pt rigoring, febrile, O2 saturation 80s% on NC and VM 50%.
pt. has fever of 101.5
glucose off CMP - 471
pt. has fever of 101.8
temp; 103.6
Patient febrile 101F
Temp: 101.6
temp 101 F

## 2025-01-26 NOTE — PROGRESS NOTE ADULT - PROBLEM SELECTOR PLAN 11
DVT prophylaxis, Lovenox 40mg s/c daily.  Encourage ambulation.  PT consult. 1/26- agitated this afternoon, appears irriatble wants to remove Hiflo nasal canula, Haldol 0.5 mg was given, contacted family (wife - Ludmila), maintained calm for 2 hrs while wife and daughter was present.  ~ 5 Pm agitated again does n0t wantt o continue treatment, attempt to calm patient down have been met with much resistance, therfore Haldol 1mg IV given, will monitor closely.

## 2025-01-26 NOTE — PROVIDER CONTACT NOTE (OTHER) - ACTION/TREATMENT ORDERED:
oxy PO to be ordered
Tylenol 650 mg PO x1  Blood cultures from central line x1  Urine culture
provider notified  fever workup is up to date  PRN tylenol 650mg PO administered, reassess temp
PO tylenol and recheck temp in 1 hr
Tylenol 1g IVPB
no interventions done pt had po tylenol @6am
Tylenol 1g IVPB
Provider notified. FS x2, insulin given.
Pt. repositioned, O2 titrated up to 6L/min, pt. placed on tele/pulse ox. SoluCortef given for adrenal insuffieciency. Tylenol 1g IVPB for fever.
pending orders. RT to come with NC
Provider notified, IV Tylenol ordered as per provider. Nursing care on-going.
Provider notified, cooling measures initiated as per provider order. Nursing care on-going.

## 2025-01-26 NOTE — PROGRESS NOTE ADULT - PROBLEM SELECTOR PLAN 5
Continue Tacrolimus 1mg in am and 1 mg at HS.  Pred 5mg bid (50% dose reduction) 1/18/25 ON HOLD ON 1/22   AT RISK FOR ADRENAL INSUFFIENCEY IF HYPOTENSIVE 50mg hydrocortisone IV q8  s/p Hydrocortisone 25 mg IV, now off--on pred taper for PJP PNA D/C Tacrolimus and Lisinopril due to DIMITRIOS and PCP.   AT RISK FOR ADRENAL INSUFFIENCEY IF HYPOTENSIVE 50mg hydrocortisone IV q8  s/p Hydrocortisone 25 mg IV, now off--on pred taper for PJP PNA

## 2025-01-26 NOTE — PROGRESS NOTE ADULT - SUBJECTIVE AND OBJECTIVE BOX
Diagnosis: PTLD    Protocol/Chemo Regimen: Plan for Tafa/Revlimid on HOLD    Pt endorsed:  Agitated, does not want to continue treatment, or OXygen    Review of Systems:   Denies pain    Pain scale:  unable to rate    Diet:   NPO    Allergies: No Known Allergies    ANTIMICROBIALS  trimethoprim / sulfamethoxazole IVPB 320 milliGRAM(s) IV Intermittent every 8 hours  valACYclovir 500 milliGRAM(s) Oral every 12 hours    HEME/ONC MEDICATIONS  enoxaparin Injectable 40 milliGRAM(s) SubCutaneous <User Schedule>      STANDING MEDICATIONS  amLODIPine   Tablet 10 milliGRAM(s) Oral daily  buPROPion XL (24-Hour) . 300 milliGRAM(s) Oral daily  carvedilol 12.5 milliGRAM(s) Oral every 12 hours  chlorhexidine 4% Liquid 1 Application(s) Topical <User Schedule>  cloNIDine 0.1 milliGRAM(s) Oral three times a day  dextrose 5%. 1000 milliLiter(s) IV Continuous <Continuous>  dextrose 5%. 1000 milliLiter(s) IV Continuous <Continuous>  dextrose 50% Injectable 25 Gram(s) IV Push once  escitalopram 10 milliGRAM(s) Oral daily  glucagon  Injectable 1 milliGRAM(s) IntraMuscular once  hydrALAZINE 100 milliGRAM(s) Oral every 8 hours  insulin lispro (ADMELOG) corrective regimen sliding scale   SubCutaneous every 4 hours  lactulose Syrup 15 Gram(s) Oral <User Schedule>  lactulose Syrup 10 Gram(s) Oral every 6 hours  levothyroxine 88 MICROGram(s) Oral daily  mupirocin 2% Nasal 1 Application(s) Both Nostrils two times a day  pantoprazole   Suspension 40 milliGRAM(s) Oral daily  polyethylene glycol 3350 17 Gram(s) Oral at bedtime  predniSONE   Tablet 40 milliGRAM(s) Oral two times a day  rosuvastatin 10 milliGRAM(s) Oral at bedtime  senna 2 Tablet(s) Oral at bedtime    PRN MEDICATIONS  acetaminophen     Tablet .. 650 milliGRAM(s) Oral every 6 hours PRN  dextrose Oral Gel 15 Gram(s) Oral once PRN  polyethylene glycol 3350 17 Gram(s) Oral daily PRN  sodium chloride 0.9% lock flush 10 milliLiter(s) IV Push every 1 hour PRN    Vital Signs Last 24 Hrs  T(C): 37.1 (26 Jan 2025 09:22), Max: 37.6 (26 Jan 2025 00:19)  T(F): 98.7 (26 Jan 2025 09:22), Max: 99.6 (26 Jan 2025 00:19)  HR: 74 (26 Jan 2025 09:49) (66 - 78)  BP: 134/72 (26 Jan 2025 09:22) (111/68 - 146/73)  BP(mean): --  RR: 18 (26 Jan 2025 09:22) (18 - 18)  SpO2: 94% (26 Jan 2025 09:49) (93% - 98%)    Parameters below as of 26 Jan 2025 09:49  Patient On (Oxygen Delivery Method): nasal cannula, high flow  O2 Flow (L/min): 60  O2 Concentration (%): 80    PHYSICAL EXAM  General: adult in NAD  HEENT: clear oropharynx, anicteric sclera  Neck: supple  CV: normal S1/S2 RRR  Lungs: positive air movement on HFNC  Abdomen: soft non-tender non-distended, (+) BS  Ext: no edema  Skin: no rashes and no petechiae  Neuro: alert and oriented X 3, no focal deficits  Central Line: PICC c/d/i    RECENT CULTURES:  01-24 @ 00:46  .Blood BLOOD  No growth at 48 Hours    01-24 @ 00:18  .Blood BLOOD  No growth at 48 Hours    01-22 @ 05:05  Clean Catch Clean Catch (Midstream)  10,000 - 49,000 CFU/mL Candida albicans  "Susceptibilities not performed"  --  01-21 @ 22:25  .Blood BLOOD  No growth at 4 days    01-21 @ 22:07  .Blood BLOOD  No growth at 4 days    01-19 @ 18:36  Clean Catch Clean Catch (Midstream)    10,000 - 49,000 CFU/mL Candida albicans  "Susceptibilities not performed"  --        LABS:                        8.1    11.69 )-----------( 552      ( 26 Jan 2025 06:53 )             24.7         Mean Cell Volume : 87.9 fl  Mean Cell Hemoglobin : 28.8 pg  Mean Cell Hemoglobin Concentration : 32.8 g/dL  Auto Neutrophil # : 11.12 K/uL  Auto Lymphocyte # : 0.09 K/uL  Auto Monocyte # : 0.26 K/uL  Auto Eosinophil # : 0.00 K/uL  Auto Basophil # : 0.02 K/uL  Auto Neutrophil % : 95.1 %  Auto Lymphocyte % : 0.8 %  Auto Monocyte % : 2.2 %  Auto Eosinophil % : 0.0 %  Auto Basophil % : 0.2 %      01-26    124[L]  |  91[L]  |  37[H]  ----------------------------<  471[HH]  5.5[H]   |  21[L]  |  1.14    Ca    9.2      26 Jan 2025 08:11  Phos  2.8     01-26  Mg     1.8     01-26    TPro  5.2[L]  /  Alb  2.2[L]  /  TBili  0.1[L]  /  DBili  x   /  AST  39  /  ALT  41  /  AlkPhos  95  01-26    Mg 1.8  Phos 2.8  Mg 1.9  Phos 2.8        Uric Acid 6.1      Uric Acid 6.2      RADIOLOGY & ADDITIONAL STUDIES:  CT Abdomen and Pelvis No Cont (01.23.25 @ 17:07) >    Diffuse bilateral patchy groundglass opacities with central predominance,   increased from 1/17/2025, may represent edema or pneumonia.  Unchanged left lower lobe round atelectasis.  Small left pleural effusion.  Unchanged retroperitoneal soft tissue encasing the aorta and IVC.  Diffuse erosive changes of the sacrum with soft tissue infiltration,   similar in appearance to prior, which could represent osteomyelitis.   Diagnosis: PTLD    Protocol/Chemo Regimen: Plan for Tafa/Revlimid on HOLD    Pt endorsed:  appears agitated does not want to continue treatment, or Oxygen    Review of Systems:   Denies pain    Pain scale:  unable to rate    Diet:   NPO    Allergies: No Known Allergies    ANTIMICROBIALS  trimethoprim / sulfamethoxazole IVPB 320 milliGRAM(s) IV Intermittent every 8 hours  valACYclovir 500 milliGRAM(s) Oral every 12 hours    HEME/ONC MEDICATIONS  enoxaparin Injectable 40 milliGRAM(s) SubCutaneous <User Schedule>    STANDING MEDICATIONS  amLODIPine   Tablet 10 milliGRAM(s) Oral daily  buPROPion XL (24-Hour) . 300 milliGRAM(s) Oral daily  carvedilol 12.5 milliGRAM(s) Oral every 12 hours  chlorhexidine 4% Liquid 1 Application(s) Topical <User Schedule>  cloNIDine 0.1 milliGRAM(s) Oral three times a day  dextrose 5%. 1000 milliLiter(s) IV Continuous <Continuous>  dextrose 5%. 1000 milliLiter(s) IV Continuous <Continuous>  dextrose 50% Injectable 25 Gram(s) IV Push once  escitalopram 10 milliGRAM(s) Oral daily  glucagon  Injectable 1 milliGRAM(s) IntraMuscular once  hydrALAZINE 100 milliGRAM(s) Oral every 8 hours  insulin lispro (ADMELOG) corrective regimen sliding scale   SubCutaneous every 4 hours  lactulose Syrup 15 Gram(s) Oral <User Schedule>  lactulose Syrup 10 Gram(s) Oral every 6 hours  levothyroxine 88 MICROGram(s) Oral daily  mupirocin 2% Nasal 1 Application(s) Both Nostrils two times a day  pantoprazole   Suspension 40 milliGRAM(s) Oral daily  polyethylene glycol 3350 17 Gram(s) Oral at bedtime  predniSONE   Tablet 40 milliGRAM(s) Oral two times a day  rosuvastatin 10 milliGRAM(s) Oral at bedtime  senna 2 Tablet(s) Oral at bedtime    PRN MEDICATIONS  acetaminophen     Tablet .. 650 milliGRAM(s) Oral every 6 hours PRN  dextrose Oral Gel 15 Gram(s) Oral once PRN  polyethylene glycol 3350 17 Gram(s) Oral daily PRN  sodium chloride 0.9% lock flush 10 milliLiter(s) IV Push every 1 hour PRN    Vital Signs Last 24 Hrs  T(C): 37.1 (26 Jan 2025 09:22), Max: 37.6 (26 Jan 2025 00:19)  T(F): 98.7 (26 Jan 2025 09:22), Max: 99.6 (26 Jan 2025 00:19)  HR: 74 (26 Jan 2025 09:49) (66 - 78)  BP: 134/72 (26 Jan 2025 09:22) (111/68 - 146/73)  BP(mean): --  RR: 18 (26 Jan 2025 09:22) (18 - 18)  SpO2: 94% (26 Jan 2025 09:49) (93% - 98%)    Parameters below as of 26 Jan 2025 09:49  Patient On (Oxygen Delivery Method): nasal cannula, high flow  O2 Flow (L/min): 60  O2 Concentration (%): 80    PHYSICAL EXAM  General: adult in NAD  HEENT: clear oropharynx, anicteric sclera  CV: normal S1/S2 RRR  Lungs: positive air movement on HFNC  Abdomen: soft non-tender non-distended, (+) BS  Ext: no edema in BLE  Skin: no rashes and no petechiae  Neuro: alert and oriented X 3, no focal deficits  Central Line: PICC c/d/i    RECENT CULTURES:  01-24 @ 00:46  .Blood BLOOD  No growth at 48 Hours    01-24 @ 00:18  .Blood BLOOD  No growth at 48 Hours    01-22 @ 05:05  Clean Catch Clean Catch (Midstream)  10,000 - 49,000 CFU/mL Candida albicans  "Susceptibilities not performed"    01-21 @ 22:25  .Blood BLOOD  No growth at 4 days    01-21 @ 22:07  .Blood BLOOD  No growth at 4 days    01-19 @ 18:36  Clean Catch Clean Catch (Midstream)    10,000 - 49,000 CFU/mL Candida albicans  "Susceptibilities not performed"    LABS:                        8.1    11.69 )-----------( 552      ( 26 Jan 2025 06:53 )             24.7     Mean Cell Volume : 87.9 fl  Mean Cell Hemoglobin : 28.8 pg  Mean Cell Hemoglobin Concentration : 32.8 g/dL  Auto Neutrophil # : 11.12 K/uL  Auto Lymphocyte # : 0.09 K/uL  Auto Monocyte # : 0.26 K/uL  Auto Eosinophil # : 0.00 K/uL  Auto Basophil # : 0.02 K/uL  Auto Neutrophil % : 95.1 %  Auto Lymphocyte % : 0.8 %  Auto Monocyte % : 2.2 %  Auto Eosinophil % : 0.0 %  Auto Basophil % : 0.2 %      01-26    124[L]  |  91[L]  |  37[H]  ----------------------------<  471[HH]  5.5[H]   |  21[L]  |  1.14    Ca    9.2      26 Jan 2025 08:11  Phos  2.8     01-26  Mg     1.8     01-26    TPro  5.2[L]  /  Alb  2.2[L]  /  TBili  0.1[L]  /  DBili  x   /  AST  39  /  ALT  41  /  AlkPhos  95  01-26    Mg 1.8  Phos 2.8  Mg 1.9  Phos 2.8        Uric Acid 6.1      Uric Acid 6.2      RADIOLOGY & ADDITIONAL STUDIES:  CT Abdomen and Pelvis No Cont (01.23.25 @ 17:07) >    Diffuse bilateral patchy groundglass opacities with central predominance,   increased from 1/17/2025, may represent edema or pneumonia.  Unchanged left lower lobe round atelectasis.  Small left pleural effusion.  Unchanged retroperitoneal soft tissue encasing the aorta and IVC.  Diffuse erosive changes of the sacrum with soft tissue infiltration,   similar in appearance to prior, which could represent osteomyelitis.

## 2025-01-26 NOTE — CONSULT NOTE ADULT - ASSESSMENT
69 y.o. M w/ PMHx DM, HTN, HLD, ESRD on HD s/p LKRT 2012 (from brother), hypothyroidism, recent admission to [facility] 11/30/24-12/18/24 for AMS found to have sacral OM and E. Coli bacteremia s/p treatment with meropenem till 12/10, complicated by hypercalcemia  (s/p calcitonin, aredia, and started on prednisone by nephrology), found to have new DLBCL on liver biopsy 12/16/24 transferred to [facility] from rehab to start R-CHOP therapy. Pt. received Mini CHOP on 12/28, now admitted for cycle #2 R mini CHOP.    1. Renal Transplant Recipient  - S/p LKRT 2012 from brother  - Baseline Scr ~0.8  - Scr 1.14 today  - Stop Tacro and all other IS in setting of PCP and taper prednisone to 5mg daily  - Stop lisinopril  - Consult transplant ID  - Would continue bactrim regardless of rising Scr due to underlying PCP infection unless another treatment suggested by transplant ID  - Transplant nephrology to take over care in AM    **Note incomplete until attending attestation**  If you have any questions, please feel free to contact me  Apolinar Jurado  Nephrology Fellow  Page [phone] / Microsoft Teams (Preferred)  (After 4pm or on weekends please page the on-call fellow) 69 y.o. M w/ PMHx DM, HTN, HLD, ESRD on HD s/p LKRT 2012 (from brother), hypothyroidism, recent admission to [facility] 11/30/24-12/18/24 for AMS found to have sacral OM and E. Coli bacteremia s/p treatment with meropenem till 12/10, complicated by hypercalcemia  (s/p calcitonin, aredia, and started on prednisone by nephrology), found to have new DLBCL on liver biopsy 12/16/24 transferred to [facility] from rehab to start R-CHOP therapy. Pt. received Mini CHOP on 12/28, now admitted for cycle #2 R mini CHOP.    1. Renal Transplant Recipient  - S/p LKRT 2012 from brother  - Baseline Scr ~0.8  - Scr 1.14 today  - Stop Tacro and all other IS in setting of PCP and taper prednisone to 5mg daily  - Stop lisinopril  - Consult transplant ID  - Would continue bactrim regardless of rising Scr due to underlying PCP infection unless another treatment suggested by transplant ID  - Transplant nephrology to take over care in AM    If you have any questions, please feel free to contact me  Apolinar Jurado  Nephrology Fellow  Page [phone] / Microsoft Teams (Preferred)  (After 4pm or on weekends please page the on-call fellow)

## 2025-01-26 NOTE — PROVIDER CONTACT NOTE (OTHER) - REASON
Patient febrile 101F
pt. febrile
glucose off CMP - 471
pt has a fever of 100.4
temp 101 F
temp; 103.6
Temp: 101.6
pt requesting pain meds
pt. febrile
febrile, hypoxic
pt temp 100.7
pt. febrile and hypoxic

## 2025-01-26 NOTE — PROGRESS NOTE ADULT - PROBLEM SELECTOR PLAN 3
Monitor FS AC/HS  Sliding scale Humalog AC/HS.  1/24 hypoglycemic to FS 20s off steroids-->adding steroids for PJP PNA, will also lower lantus 38-->24U QHS and continue 8U premeal admelog 1/26 - Hyperglycemia, changed sliding scale coverage, endocrine consulted.  1/26 - NPO until BG, moderate-dose correction insulin q4h until glucose is under 250, IV fluids as tolerated.   Recommend Lantus 36 units QHS   Once glucose normalized ,recommend restarting diet and recommend Admelog 18 units TID before meals (HOLD if NPO or not eating). Taper downwards preemptively if steroids are being reduced to avoid hypoglycemia.   Recommend moderate dose ADMELOG correction scale TIDQAC and separate moderate dose scale QHS  Please check FSG before meals and QHS, or q6h while NPO  Inpatient glucose goals: <140 pre-meal, <180 random.  consistent carb diet when eating  Monitor FS AC/HS  Sliding scale Humalog AC/HS.  1/24 hypoglycemic to FS 20s off steroids-->adding steroids for PJP PNA, will also lower lantus 38-->24U QHS and continue 8U premeal admelog

## 2025-01-26 NOTE — CONSULT NOTE ADULT - ATTENDING COMMENTS
I have seen this patient with the fellow and agree with their assessment and plan. I was physically present for significant portions of the evaluation and management (E/M) service provided.  I agree with the above history, physical, and plan which I have reviewed and edited where appropriate.    LKRT- given ongoing cancer and PCP likely infection- would dc tacro for now, check level tomorrow AM( trough)  Cont pred as you are and eventually he can be on 20mg of pred( was his recent dc dose)  Crt at baseline 0.8 and 1.1 due to hyperglyecmia    GFR- the cystatin C based gfr and crt regular maybe misaligned now due to DIMITRIOS- given hyperglycemia- fixing that will allow for recovery and better dosing  Hold ACEi as well    PCP- would cont bactrim as per ID. As best treatment for PCP      - Transplant nephrology to take over care in AM ( Dr Magana)    dw with Dr Garcia and ID attending     Rachelle Strauss MD  Office   Contact me directly via Microsoft Teams     (After 5 pm or on weekends please page the on-call fellow/attending, can check [email or web address] for schedule. Login is karen whitehead, schedule under [facility] medicine, psych, derm)

## 2025-01-26 NOTE — CONSULT NOTE ADULT - SUBJECTIVE AND OBJECTIVE BOX
[facility] DIVISION OF KIDNEY DISEASES AND HYPERTENSION -- [phone]  -- INITIAL CONSULT NOTE  --------------------------------------------------------------------------------  HPI: Pt. is a 69 y.o. M w/ PMHx DM, HTN, HLD, ESRD on HD s/p LKRT 2012 (from brother), hypothyroidism, recent admission to [facility] 11/30/24-12/18/24 for AMS found to have sacral OM and E. Coli bacteremia s/p treatment with meropenem till 12/10, complicated by hypercalcemia  (s/p calcitonin, aredia, and started on prednisone by nephrology), found to have new DLBCL on liver biopsy 12/16/24 transferred to [facility] from rehab to start R-CHOP therapy. Pt. received Mini CHOP on 12/28, now admitted for cycle #2 R mini CHOP.    Pt. seen and examined earlier today. Pt. feels okay, mild SOB on HFNC. Pt. states he sees Dr. Ruvalcaba for nephrology in [place]. Pt. knows he is on Tacro but cannot recall his other medications. No fever, chills, CP, or LE swelling reported. Pt. A& O 1-2 (person and year).    PAST HISTORY  --------------------------------------------------------------------------------  PAST MEDICAL & SURGICAL HISTORY:  Diabetes Mellitus Type II  Insulin pump (2010)    GERD (gastroesophageal reflux disease)    Depression    Hypothyroidism    Hyperlipidemia    Kidney transplanted  2012    Hypertension    ANGELIKA (obstructive sleep apnea)    Peripheral neuropathy    Shoulder fracture  Left.    Hypothyroidism    History of renal transplant    DLBCL (diffuse large B cell lymphoma)    Infectious disease    Type 2 diabetes mellitus    Hypothyroidism    Transplanted kidney    History of colonoscopy    S/P kidney transplant  2012    S/P cholecystectomy    Retroperitoneal fibrosis  s/p surgery    AV fistula  Right arm    S/P right cataract extraction    S/P left cataract extraction    H/O unilateral nephrectomy  Left    FAMILY HISTORY:  MI (myocardial infarction)    Family history of obesity (Sibling)    PAST SOCIAL HISTORY:    ALLERGIES & MEDICATIONS  --------------------------------------------------------------------------------  Allergies    No Known Allergies    Intolerances    Standing Inpatient Medications  amLODIPine   Tablet 10 milliGRAM(s) Oral daily  buPROPion XL (24-Hour) . 300 milliGRAM(s) Oral daily  carvedilol 12.5 milliGRAM(s) Oral every 12 hours  chlorhexidine 4% Liquid 1 Application(s) Topical <User Schedule>  cloNIDine 0.1 milliGRAM(s) Oral three times a day  dextrose 5%. 1000 milliLiter(s) IV Continuous <Continuous>  dextrose 5%. 1000 milliLiter(s) IV Continuous <Continuous>  dextrose 50% Injectable 25 Gram(s) IV Push once  dextrose 50% Injectable 25 Gram(s) IV Push once  dextrose 50% Injectable 25 Gram(s) IV Push once  enoxaparin Injectable 40 milliGRAM(s) SubCutaneous <User Schedule>  escitalopram 10 milliGRAM(s) Oral daily  glucagon  Injectable 1 milliGRAM(s) IntraMuscular once  glucagon  Injectable 1 milliGRAM(s) IntraMuscular once  hydrALAZINE 100 milliGRAM(s) Oral every 8 hours  insulin glargine Injectable (LANTUS) 24 Unit(s) SubCutaneous at bedtime  insulin lispro (ADMELOG) corrective regimen sliding scale   SubCutaneous three times a day before meals  insulin lispro (ADMELOG) corrective regimen sliding scale   SubCutaneous at bedtime  insulin lispro Injectable (ADMELOG) 10 Unit(s) SubCutaneous before breakfast  insulin lispro Injectable (ADMELOG) 10 Unit(s) SubCutaneous before lunch  insulin lispro Injectable (ADMELOG) 10 Unit(s) SubCutaneous before dinner  lactulose Syrup 15 Gram(s) Oral <User Schedule>  lactulose Syrup 10 Gram(s) Oral every 6 hours  levothyroxine 88 MICROGram(s) Oral daily  lisinopril 10 milliGRAM(s) Oral daily  meropenem  IVPB 1000 milliGRAM(s) IV Intermittent every 8 hours  mupirocin 2% Nasal 1 Application(s) Both Nostrils two times a day  pantoprazole   Suspension 40 milliGRAM(s) Oral daily  polyethylene glycol 3350 17 Gram(s) Oral at bedtime  predniSONE   Tablet 40 milliGRAM(s) Oral two times a day  rosuvastatin 10 milliGRAM(s) Oral at bedtime  senna 2 Tablet(s) Oral at bedtime  tacrolimus 1 milliGRAM(s) Oral <User Schedule>  tacrolimus 1 milliGRAM(s) Oral at bedtime  trimethoprim / sulfamethoxazole IVPB 320 milliGRAM(s) IV Intermittent every 8 hours  valACYclovir 500 milliGRAM(s) Oral every 12 hours    PRN Inpatient Medications  acetaminophen     Tablet .. 650 milliGRAM(s) Oral every 6 hours PRN  dextrose Oral Gel 15 Gram(s) Oral once PRN  polyethylene glycol 3350 17 Gram(s) Oral daily PRN  sodium chloride 0.9% lock flush 10 milliLiter(s) IV Push every 1 hour PRN    REVIEW OF SYSTEMS  --------------------------------------------------------------------------------  Gen: No fevers/chills  Skin: No rash  Head/Eyes/Ears: No headache  Respiratory: +dyspnea  CV: No chest pain  GI: No abdominal pain  : No dysuria  MSK: No edema    All other systems were reviewed and are negative, except as noted.    VITALS/PHYSICAL EXAM  --------------------------------------------------------------------------------  T(C): 36.8 (01-26-25 @ 06:00), Max: 37.6 (01-26-25 @ 00:19)  HR: 74 (01-26-25 @ 09:49) (66 - 78)  BP: 146/73 (01-26-25 @ 06:00) (105/58 - 146/73)  RR: 18 (01-26-25 @ 06:45) (18 - 18)  SpO2: 94% (01-26-25 @ 09:49) (93% - 96%)  Wt(kg): --    01-25-25 @ 07:01  -  01-26-25 @ 07:00  --------------------------------------------------------  IN: 2480 mL / OUT: 2350 mL / NET: 130 mL    01-26-25 @ 07:01  -  01-26-25 @ 10:13  --------------------------------------------------------  IN: 0 mL / OUT: 700 mL / NET: -700 mL    Physical Exam:  Gen: NAD  HEENT: MMM  Pulm: Crackles, +HFNC  CV: S1S2  Abd: Soft, +BS   Ext: No LE edema B/L  Neuro: Awake  Skin: Warm and dry  Vascular access: peripheral IVs    LABS/STUDIES  --------------------------------------------------------------------------------              8.1    11.69 >-----------<  552      [01-26-25 @ 06:53]              24.7     124  |  91  |  37  ----------------------------<  471      [01-26-25 @ 08:11]  5.5   |  21  |  1.14        Ca     9.2     [01-26-25 @ 08:11]      Mg     1.8     [01-26-25 @ 08:11]      Phos  2.8     [01-26-25 @ 08:11]    TPro  5.2  /  Alb  2.2  /  TBili  0.1  /  DBili  x   /  AST  39  /  ALT  41  /  AlkPhos  95  [01-26-25 @ 08:11]    Uric acid 6.1      [01-26-25 @ 08:11]        [01-26-25 @ 08:11]    Creatinine Trend:  SCr 1.14 [01-26 @ 08:11]  SCr 1.17 [01-26 @ 06:54]  SCr 1.05 [01-25 @ 06:37]  SCr 0.93 [01-24 @ 07:23]  SCr 0.84 [01-23 @ 07:00]    Iron 16, TIBC 182, %sat 9      [12-05-24 @ 10:00]  Ferritin 973      [12-05-24 @ 10:00]  PTH -- (Ca 11.2)      [12-28-24 @ 10:06]   14  PTH -- (Ca --)      [12-02-24 @ 11:50]   11  Vitamin D (25OH) 39.8      [12-28-24 @ 10:06]  HbA1c 6.9      [02-04-20 @ 11:12]  TSH 0.55      [12-31-24 @ 07:04]    HBsAb Reactive      [12-28-24 @ 18:43]  HBsAg Nonreact      [12-27-24 @ 13:43]  HBcAb Nonreact      [12-28-24 @ 18:43]  HCV 0.25, Nonreact      [12-27-24 @ 13:43]  HIV Nonreact      [12-27-24 @ 13:43]    Free Light Chains: kappa 11.12, lambda 5.08, ratio = 2.19      [12-04 @ 16:06]  Immunofixation Serum:   Weak IgM Lambda Band Identified    Reference Range: None Detected      [11-29-24 @ 17:10]  SPEP Interpretation: Duplicate Sample      [12-04-24 @ 16:06]  Immunofixation Urine: Weak Bence Hernandez protein Kappa type      [12-07-24 @ 05:00]  UPEP Interpretation: Weak Beta-Migrating Paraprotein Identified      [12-07-24 @ 05:00]

## 2025-01-26 NOTE — PROGRESS NOTE ADULT - PROBLEM SELECTOR PLAN 12
DVT prophylaxis, Lovenox 40mg s/c daily.  Encourage ambulation.  PT consult. DVT prophylaxis, Lovenox 40mg s/c daily.  Encourage ambulation.  PT consult.  Code status: full everardo, Dr. Garcia explained to wife (Ludmila) who is the HCP about patient's current health status and prognosis, options for code status was explained. His HCP expressed that she would like Piero to continue all life sustaining treatments therefore patient will remian as FULL CODE.

## 2025-01-26 NOTE — PROGRESS NOTE ADULT - ASSESSMENT
68 y/o M PMHx renal transplant 2012 (2/2 DM, s/p left nephrectomy), peripheral neuropathy, hypothyroidism, retroperitoneal fibrosis, HTN, HLD, GERD, anxiety/depression, ANGELIKA and recent admission at [facility] for sacral osteomyelitis and ESBL.coli urosepsis discharged on 12/18/24 to rehab. While admitted to [place] was noted to have hypercalcemia and liver masses which were biopsied on 12/16/24, biopsy revealed DLBCL. Patient received R mini CHOP on 12/28 now admitted for cycle #2 Rmini CHOP, upon admission patient is febrile with increasing lung opacities for which pulmonary is consulted    ddx here would include PJP given on steroids and immunosuppressed but was not on Bactrim  viral pneumonitis would also be on ddx  the patient is on maximal oxygen therapy via HFNC and the risk of the procedure outweighs the benefit given he is already receiving empiric treatment for PJP, discussed risk/benefit with family and they are in agreement  suggest noninvasive workup to begin, then pending clinical course will further determine role for bronchoscopy  coronavirus+    - send PJP PCR sputum  - sputum culture  - viral serologies and PCR for CMV, EBV, HSV  - repeat full RVP  - broad spectrum abx per ID, empiric PJP coverage reasonable - would consider bumping steroids to 1 mg/kg for PJP  - f/u Fungitell, galactomannan  - add azithro, send urine legionella  - recommend IV diuresis (can start with 20-40mg of IV lasix) and assess response given GGO with pleural effusions; check TTE  - pulm to follow    Please notify pulmonary prior to discharge and email [email or web address] to schedule an appointment; please provide appointment info to patient    Follow up at  [address] [place] [address], [address]  [place] [address]  tel: [phone]  fax: [phone]

## 2025-01-26 NOTE — PROGRESS NOTE ADULT - PROBLEM SELECTOR PLAN 1
Admitted  for R mini CHOP, found to be febrile on admission hold  chemo   Monitor CBC with diff, transfuse as needed.  Monitor electrolytes, replete as needed.  Daily weights.  Strict I/O. mouth care  Plans for any chemo or tafasitamab/lenalidomide are now ON HOLD given severe infections and fevers.  1/25 stable on HFNC. 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

## 2025-01-26 NOTE — PROGRESS NOTE ADULT - SUBJECTIVE AND OBJECTIVE BOX
PATIENT:  JAN CALVIN  [id number]    CHIEF COMPLAINT:  Patient is a 69y old  Male who presents with a chief complaint of "For chemo" (24 Jan 2025 08:35)    INTERVAL HISTORY/OVERNIGHT EVENTS:  No acute interval events. No new symptoms or respiratory complaints. HFNC decreased to 55/75 today. O2 saturation remained 100%.     MEDICATIONS:  MEDICATIONS  (STANDING):  alteplase for catheter clearance 2 milliGRAM(s) Catheter once  alteplase for catheter clearance 2 milliGRAM(s) Catheter once  amLODIPine   Tablet 10 milliGRAM(s) Oral daily  buPROPion XL (24-Hour) . 300 milliGRAM(s) Oral daily  carvedilol 12.5 milliGRAM(s) Oral every 12 hours  chlorhexidine 4% Liquid 1 Application(s) Topical <User Schedule>  cloNIDine 0.1 milliGRAM(s) Oral three times a day  dextrose 5%. 1000 milliLiter(s) (50 mL/Hr) IV Continuous <Continuous>  dextrose 5%. 1000 milliLiter(s) (100 mL/Hr) IV Continuous <Continuous>  dextrose 50% Injectable 25 Gram(s) IV Push once  dextrose 50% Injectable 12.5 Gram(s) IV Push once  dextrose 50% Injectable 25 Gram(s) IV Push once  enoxaparin Injectable 40 milliGRAM(s) SubCutaneous <User Schedule>  escitalopram 10 milliGRAM(s) Oral daily  glucagon  Injectable 1 milliGRAM(s) IntraMuscular once  glucagon  Injectable 1 milliGRAM(s) IntraMuscular once  hydrALAZINE 100 milliGRAM(s) Oral every 8 hours  insulin glargine Injectable (LANTUS) 24 Unit(s) SubCutaneous at bedtime  insulin lispro (ADMELOG) corrective regimen sliding scale   SubCutaneous three times a day before meals  insulin lispro (ADMELOG) corrective regimen sliding scale   SubCutaneous at bedtime  insulin lispro Injectable (ADMELOG) 8 Unit(s) SubCutaneous before breakfast  insulin lispro Injectable (ADMELOG) 8 Unit(s) SubCutaneous before lunch  insulin lispro Injectable (ADMELOG) 8 Unit(s) SubCutaneous before dinner  lactulose Syrup 15 Gram(s) Oral <User Schedule>  lactulose Syrup 10 Gram(s) Oral every 6 hours  levothyroxine 88 MICROGram(s) Oral daily  lisinopril 10 milliGRAM(s) Oral daily  meropenem  IVPB 1000 milliGRAM(s) IV Intermittent every 8 hours  mupirocin 2% Nasal 1 Application(s) Both Nostrils two times a day  pantoprazole   Suspension 40 milliGRAM(s) Oral daily  polyethylene glycol 3350 17 Gram(s) Oral at bedtime  predniSONE   Tablet 40 milliGRAM(s) Oral two times a day  rosuvastatin 10 milliGRAM(s) Oral at bedtime  senna 2 Tablet(s) Oral at bedtime  tacrolimus 1 milliGRAM(s) Oral <User Schedule>  tacrolimus 1 milliGRAM(s) Oral at bedtime  trimethoprim / sulfamethoxazole IVPB 320 milliGRAM(s) IV Intermittent every 8 hours    MEDICATIONS  (PRN):  acetaminophen     Tablet .. 650 milliGRAM(s) Oral every 6 hours PRN Temp greater or equal to 38C (100.4F), Mild Pain (1 - 3)  dextrose Oral Gel 15 Gram(s) Oral once PRN Blood Glucose LESS THAN 70 milliGRAM(s)/deciliter  polyethylene glycol 3350 17 Gram(s) Oral daily PRN for constipation  sodium chloride 0.9% lock flush 10 milliLiter(s) IV Push every 1 hour PRN Pre/post blood products, medications, blood draw, and to maintain line patency      ALLERGIES:  Allergies    No Known Allergies    Intolerances        OBJECTIVE:  ICU Vital Signs Last 24 Hrs  T(C): 36.6 (24 Jan 2025 09:39), Max: 38.8 (23 Jan 2025 16:52)  T(F): 97.9 (24 Jan 2025 09:39), Max: 101.8 (23 Jan 2025 16:52)  HR: 85 (24 Jan 2025 09:48) (72 - 100)  BP: 104/63 (24 Jan 2025 09:39) (104/63 - 175/74)  RR: 20 (24 Jan 2025 09:48) (18 - 20)  SpO2: 92% (24 Jan 2025 09:48) (81% - 97%)    O2 Parameters below as of 24 Jan 2025 09:48  Patient On (Oxygen Delivery Method): nasal cannula, high flow  O2 Flow (L/min): 60  O2 Concentration (%): 100      POCT Blood Glucose.: 206 mg/dL (24 Jan 2025 12:23)  POCT Blood Glucose.: 94 mg/dL (24 Jan 2025 09:54)  POCT Blood Glucose.: 92 mg/dL (24 Jan 2025 09:24)  POCT Blood Glucose.: 26 mg/dL (24 Jan 2025 08:37)  POCT Blood Glucose.: 27 mg/dL (24 Jan 2025 08:34)  POCT Blood Glucose.: 142 mg/dL (23 Jan 2025 21:52)  POCT Blood Glucose.: 134 mg/dL (23 Jan 2025 18:53)  POCT Blood Glucose.: 206 mg/dL (24 Jan 2025 12:23)    I&O's Summary    23 Jan 2025 07:01  -  24 Jan 2025 07:00  --------------------------------------------------------  IN: 2131 mL / OUT: 2925 mL / NET: -794 mL    24 Jan 2025 07:01  -  24 Jan 2025 13:52  --------------------------------------------------------  IN: 180 mL / OUT: 400 mL / NET: -220 mL    PHYSICAL EXAMINATION:  General: WN/WD NAD  HEENT: PERRL, EOMI, moist mucous membranes  Neurology: A&Ox3, nonfocal, GARDNER x 4  Respiratory: crackles  CV: RRR, S1S2, no murmurs, rubs or gallops  Abdominal: Soft, NT, ND +BS  Extremities: No edema, + peripheral pulses    LABS:                          8.5    10.35 )-----------( 693      ( 24 Jan 2025 07:30 )             26.3     01-24    132[L]  |  97  |  26[H]  ----------------------------<  21[LL]  4.5   |  22  |  0.93    Ca    9.1      24 Jan 2025 07:23  Phos  3.1     01-24  Mg     1.6     01-24    TPro  5.6[L]  /  Alb  2.3[L]  /  TBili  0.3  /  DBili  x   /  AST  64[H]  /  ALT  43  /  AlkPhos  96  01-24    LIVER FUNCTIONS - ( 24 Jan 2025 07:23 )  Alb: 2.3 g/dL / Pro: 5.6 g/dL / ALK PHOS: 96 U/L / ALT: 43 U/L / AST: 64 U/L / GGT: x             Urinalysis Basic - ( 24 Jan 2025 07:23 )    Color: x / Appearance: x / SG: x / pH: x  Gluc: 21 mg/dL / Ketone: x  / Bili: x / Urobili: x   Blood: x / Protein: x / Nitrite: x   Leuk Esterase: x / RBC: x / WBC x   Sq Epi: x / Non Sq Epi: x / Bacteria: x

## 2025-01-26 NOTE — PROGRESS NOTE ADULT - ASSESSMENT
68 y/o M PMHx renal transplant 2012 (2/2 DM, s/p left nephrectomy), peripheral neuropathy, hypothyroidism, retroperitoneal fibrosis, HTN, HLD, GERD, anxiety/depression, ANGELIKA and recent admission at [facility] for sacral osteomyelitis and ESBL.coli urosepsis discharged on 12/18/24 to rehab. While admitted to [place] was noted to have hypercalcemia and liver masses which were biopsied on 12/16/24, biopsy revealed DLBCL. Patient received R mini CHOP on 12/28 now admitted for cycle #2 R-mini CHOP, upon admission patient is febrile and Chemotherapy is on HOLD. Pt has been pancytopenic secondary to chemotherapy as well as infection. Currently undergoing workup or suspected PJP pneumonia.

## 2025-01-26 NOTE — CONSULT NOTE ADULT - ATTENDING COMMENTS
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  Endocrinology consulted for hyperglycemia.    Persistent hyperglycemia in setting of high dose steroid use for DLBCL chemotherapy. Will increase basal bolus regimen as above based on past insulin requirements. Also c/w home dose LT4, as pt is euthyroid.

## 2025-01-26 NOTE — PROVIDER CONTACT NOTE (OTHER) - DATE AND TIME:
18-Jan-2025 03:41
23-Jan-2025 16:52
18-Jan-2025 09:00
19-Jan-2025 21:26
23-Jan-2025 23:25
26-Jan-2025 09:27
22-Jan-2025 09:35
19-Jan-2025 17:15
22-Jan-2025 20:20
23-Jan-2025 12:20
20-Jan-2025 18:00
24-Jan-2025 05:50

## 2025-01-26 NOTE — PROGRESS NOTE ADULT - SUBJECTIVE AND OBJECTIVE BOX
[facility]  Infectious Disease  Dr Briones, Dr Leos, Dr Saul, FELICIA Limon Doug  [phone]  after hours and weekends [phone]    Name: JAN CALVIN  Age: 69y  Gender: Male  MRN: [id number]    Interval History--  Notes reviewed  states hes ok        Allergies    No Known Allergies    Intolerances        Medications--  Antibiotics:  meropenem  IVPB 1000 milliGRAM(s) IV Intermittent every 8 hours  trimethoprim / sulfamethoxazole IVPB 320 milliGRAM(s) IV Intermittent every 8 hours  valACYclovir 500 milliGRAM(s) Oral every 12 hours    Immunologic:    Other:  acetaminophen     Tablet .. PRN  amLODIPine   Tablet  buPROPion XL (24-Hour) .  carvedilol  chlorhexidine 4% Liquid  cloNIDine  dextrose 5%.  dextrose 5%.  dextrose 50% Injectable  dextrose Oral Gel PRN  enoxaparin Injectable  escitalopram  glucagon  Injectable  hydrALAZINE  insulin lispro (ADMELOG) corrective regimen sliding scale  lactulose Syrup  lactulose Syrup  levothyroxine  mupirocin 2% Nasal  pantoprazole   Suspension  polyethylene glycol 3350 PRN  polyethylene glycol 3350  predniSONE   Tablet  rosuvastatin  senna  sodium chloride 0.9% lock flush PRN      Review of Systems--  A 10-point review of systems was obtained.     limited   Review of systems otherwise negative except as previously noted.    Physical Examination--  Vital Signs: T(F): 98.3 (01-26-25 @ 06:00), Max: 99.6 (01-26-25 @ 00:19)  HR: 74 (01-26-25 @ 09:49)  BP: 146/73 (01-26-25 @ 06:00)  RR: 18 (01-26-25 @ 06:45)  SpO2: 94% (01-26-25 @ 09:49)  Wt(kg): --  General: ill appearing   HEENT: AT/NC.   Neck: Not rigid. No sense of mass.  Nodes: None palpable.  Lungs: Clear bilaterally without rales, wheezing or rhonchi  Heart: Regular rate and rhythm.   Abdomen: Bowel sounds present and normoactive. Soft. distended.   Extremities: No cyanosis or clubbing. + edema.   Skin: Warm. Dry. Good turgor. No rash. No vasculitic stigmata.          Laboratory Studies--  CBC                        8.1    11.69 )-----------( 552      ( 26 Jan 2025 06:53 )             24.7       Chemistries  01-26    124[L]  |  91[L]  |  37[H]  ----------------------------<  471[HH]  5.5[H]   |  21[L]  |  1.14    Ca    9.2      26 Jan 2025 08:11  Phos  2.8     01-26  Mg     1.8     01-26    TPro  5.2[L]  /  Alb  2.2[L]  /  TBili  0.1[L]  /  DBili  x   /  AST  39  /  ALT  41  /  AlkPhos  95  01-26      Culture Data    Culture - Blood (collected 24 Jan 2025 00:46)  Source: .Blood BLOOD  Preliminary Report (26 Jan 2025 04:01):    No growth at 48 Hours    Culture - Blood (collected 24 Jan 2025 00:18)  Source: .Blood BLOOD  Preliminary Report (26 Jan 2025 04:01):    No growth at 48 Hours    Culture - Urine (collected 22 Jan 2025 05:05)  Source: Clean Catch Clean Catch (Midstream)  Final Report (24 Jan 2025 13:31):    10,000 - 49,000 CFU/mL Candida albicans    "Susceptibilities not performed"    Culture - Blood (collected 21 Jan 2025 22:25)  Source: .Blood BLOOD  Preliminary Report (26 Jan 2025 03:01):    No growth at 4 days    Culture - Blood (collected 21 Jan 2025 22:07)  Source: .Blood BLOOD  Preliminary Report (26 Jan 2025 03:01):    No growth at 4 days    Culture - Blood (collected 19 Jan 2025 18:36)  Source: .Blood BLOOD  Final Report (24 Jan 2025 23:00):    No growth at 5 days    Culture - Urine (collected 19 Jan 2025 18:36)  Source: Clean Catch Clean Catch (Midstream)  Final Report (20 Jan 2025 21:26):    10,000 - 49,000 CFU/mL Candida albicans    "Susceptibilities not performed"      Fungitell (01.24.25 @ 00:46)   Fungitell: >500: Interpretation: T

## 2025-01-26 NOTE — PROGRESS NOTE ADULT - PROBLEM SELECTOR PLAN 2
Not neutropenic. febrile   1/17- F/u Flu/COVID PCR , + for Coronavirus   (last hospitalization: osteomyelitis and E coli urosepsis (12/1/24 - 12/18/24)   [facility] (12/18/24): for osteomyelitis & E coli urosepsis).  1/18 - Oral candidiasis - Nystatin S/S  1/19- ID consult, followed by [facility] ID, Zosyn changed to Ertapenem  history of ESBL Kleb in Ucx 12/31/24, ESBL E.coli 11/29/24 Ucx  1/24 - CT chest with c/f new PCP pneumonia as well as unchanged sacral OM. ID recs DC IV vanc, switch IV erta to IV mick given hypoalbuminemia efficacy concerns and start IV bactrim for empiric PCP PNA treatment as well as steroid taper pred 40mg BID x5 days followed by pred 40mg QD x5d then 20mg QD.   1/24 fungitell high > 500; pending galactoman, beta D glucan Not neutropenic. febrile   1/17- F/u Flu/COVID PCR , + for Coronavirus   (last hospitalization: osteomyelitis and E coli urosepsis (12/1/24 - 12/18/24)   [facility] (12/18/24): for osteomyelitis & E coli urosepsis).  1/18 - Oral candidiasis - Nystatin S/S  1/19- ID consult, followed by [facility] ID, Zosyn changed to Ertapenem  history of ESBL Kleb in Ucx 12/31/24, ESBL E.coli 11/29/24 Ucx  1/24 - CT chest with c/f new PCP pneumonia as well as unchanged sacral OM. ID recs DC IV vanc, switch IV erta to IV mick given hypoalbuminemia efficacy concerns and start IV bactrim for empiric PCP PNA treatment as well as steroid taper pred 40mg BID x5 days followed by pred 40mg QD x5d then 20mg QD.   1/24 fungitell high > 500; pending galactoman, beta D glucan  1/26- D/Noble Meropenem

## 2025-01-26 NOTE — CONSULT NOTE ADULT - NS ATTEST RISK PROBLEM GEN_ALL_CORE FT
Endocrine team consulted for uncontrolled diabetes. Patient is high risk with high level decision making due to uncontrolled diabetes which places patient at high risk for cardiovascular and cerebrovascular events. Patient with lability of glucose requiring close monitoring and insulin adjustments.

## 2025-01-26 NOTE — PROGRESS NOTE ADULT - NS ATTEND AMEND GEN_ALL_CORE FT
.  Primary: [place]    Vital Signs Last 24 Hrs  T(C): 37.6 (26 Jan 2025 00:19), Max: 37.6 (26 Jan 2025 00:19)  T(F): 99.6 (26 Jan 2025 00:19), Max: 99.6 (26 Jan 2025 00:19)  HR: 78 (26 Jan 2025 00:19) (64 - 89)  BP: 126/56 (26 Jan 2025 00:19) (93/53 - 126/56)  BP(mean): --  RR: 18 (26 Jan 2025 00:19) (16 - 20)  SpO2: 94% (26 Jan 2025 00:19) (93% - 99%)    Parameters below as of 26 Jan 2025 00:19  Patient On (Oxygen Delivery Method): HFNC  O2 Flow (L/min): 60  O2 Concentration (%): 80    MEDICATIONS  (STANDING):  amLODIPine   Tablet 10 milliGRAM(s) Oral daily  buPROPion XL (24-Hour) . 300 milliGRAM(s) Oral daily  carvedilol 12.5 milliGRAM(s) Oral every 12 hours  chlorhexidine 4% Liquid 1 Application(s) Topical <User Schedule>  cloNIDine 0.1 milliGRAM(s) Oral three times a day  dextrose 5%. 1000 milliLiter(s) (50 mL/Hr) IV Continuous <Continuous>  dextrose 5%. 1000 milliLiter(s) (100 mL/Hr) IV Continuous <Continuous>  dextrose 50% Injectable 25 Gram(s) IV Push once  dextrose 50% Injectable 12.5 Gram(s) IV Push once  dextrose 50% Injectable 25 Gram(s) IV Push once  enoxaparin Injectable 40 milliGRAM(s) SubCutaneous <User Schedule>  escitalopram 10 milliGRAM(s) Oral daily  glucagon  Injectable 1 milliGRAM(s) IntraMuscular once  glucagon  Injectable 1 milliGRAM(s) IntraMuscular once  hydrALAZINE 100 milliGRAM(s) Oral every 8 hours  insulin glargine Injectable (LANTUS) 24 Unit(s) SubCutaneous at bedtime  insulin lispro (ADMELOG) corrective regimen sliding scale   SubCutaneous three times a day before meals  insulin lispro (ADMELOG) corrective regimen sliding scale   SubCutaneous at bedtime  insulin lispro Injectable (ADMELOG) 10 Unit(s) SubCutaneous before breakfast  insulin lispro Injectable (ADMELOG) 10 Unit(s) SubCutaneous before lunch  insulin lispro Injectable (ADMELOG) 10 Unit(s) SubCutaneous before dinner  lactulose Syrup 15 Gram(s) Oral <User Schedule>  lactulose Syrup 10 Gram(s) Oral every 6 hours  levothyroxine 88 MICROGram(s) Oral daily  lisinopril 10 milliGRAM(s) Oral daily  meropenem  IVPB 1000 milliGRAM(s) IV Intermittent every 8 hours  mupirocin 2% Nasal 1 Application(s) Both Nostrils two times a day  pantoprazole   Suspension 40 milliGRAM(s) Oral daily  polyethylene glycol 3350 17 Gram(s) Oral at bedtime  predniSONE   Tablet 40 milliGRAM(s) Oral two times a day  rosuvastatin 10 milliGRAM(s) Oral at bedtime  senna 2 Tablet(s) Oral at bedtime  tacrolimus 1 milliGRAM(s) Oral <User Schedule>  tacrolimus 1 milliGRAM(s) Oral at bedtime  trimethoprim / sulfamethoxazole IVPB 320 milliGRAM(s) IV Intermittent every 8 hours  valACYclovir 500 milliGRAM(s) Oral every 12 hours        wife (Ludmila), daughter (Luz Marina) and son (Jose J).    Assessment: Piero was [facility] for day 1 cycle 2 mini H(O)P for PTLD but suffered from urosepsis with Klebsiella ESBL (Zosyn sensitive) and nasal corona virus.   Course further complicated by sustained high fevers & based on CT higher concern for PCP    Cystatin C eGFR: 40; standard eGFR calculation: 96    PMHx::  1) renal transplant 2012: left nephrectomy; renal transplant was secondary to DM, 2) AODM, 3) HLD, 4) hypothyroidism, 5) peripheral neuropathy  6) retroperitoneal fibrosis, 7) GERD, 8) HTN, 9) anxiety/depression, 10) obstructive sleep apnea, 11) osteomyelitis and E coli urosepsis (12/1/24 - 12/18/24)    Plan:  Heme:  Hold planned chemotherapy. given toxic presentation and sustained high fevers unlikely to continue anthracycline-based treatment.   Approved for Tafa/REV but given PCP Dx hold this therapy too.       ID:  mick (1/24/25 - ), valtrex given high dose steroids   For presumed PCP: prednisone 40mg bid (1/24/25 - ), bactrim 320mg q8 (1/24/25 - ): follow renal function given Cystatin C eGFR; LDH elevated.  galactomannan  and B-D glucan are pending - for PCP evaluation.    ID History  zosyn (1/17/25 - 1/20/25)  ertapenum (1/20/25 - 1/24/25)    Radiographs: Last chest/abd pelvis: 1/17/25, pelvic radiographs: pending; may also require MRI for avascular necrosis   Chest (1/23/25): concerning for PCP    Renal: continue      PM&R consulted: for inability to weight bare before respiratory distress.    DVT prophylaxis: LWHx     FULL CODE    Over 60 minutes were spent in direct patient care and care coordination. .  Primary: [place]    Vital Signs Last 24 Hrs  T(C): 37.6 (26 Jan 2025 00:19), Max: 37.6 (26 Jan 2025 00:19)  T(F): 99.6 (26 Jan 2025 00:19), Max: 99.6 (26 Jan 2025 00:19)  HR: 78 (26 Jan 2025 00:19) (64 - 89)  BP: 126/56 (26 Jan 2025 00:19) (93/53 - 126/56)  BP(mean): --  RR: 18 (26 Jan 2025 00:19) (16 - 20)  SpO2: 94% (26 Jan 2025 00:19) (93% - 99%)    Parameters below as of 26 Jan 2025 00:19  Patient On (Oxygen Delivery Method): HFNC  O2 Flow (L/min): 60  O2 Concentration (%): 80    MEDICATIONS  (STANDING):  amLODIPine   Tablet 10 milliGRAM(s) Oral daily  buPROPion XL (24-Hour) . 300 milliGRAM(s) Oral daily  carvedilol 12.5 milliGRAM(s) Oral every 12 hours  chlorhexidine 4% Liquid 1 Application(s) Topical <User Schedule>  cloNIDine 0.1 milliGRAM(s) Oral three times a day  dextrose 5%. 1000 milliLiter(s) (50 mL/Hr) IV Continuous <Continuous>  dextrose 5%. 1000 milliLiter(s) (100 mL/Hr) IV Continuous <Continuous>  dextrose 50% Injectable 25 Gram(s) IV Push once  dextrose 50% Injectable 12.5 Gram(s) IV Push once  dextrose 50% Injectable 25 Gram(s) IV Push once  enoxaparin Injectable 40 milliGRAM(s) SubCutaneous <User Schedule>  escitalopram 10 milliGRAM(s) Oral daily  glucagon  Injectable 1 milliGRAM(s) IntraMuscular once  glucagon  Injectable 1 milliGRAM(s) IntraMuscular once  hydrALAZINE 100 milliGRAM(s) Oral every 8 hours  insulin glargine Injectable (LANTUS) 24 Unit(s) SubCutaneous at bedtime  insulin lispro (ADMELOG) corrective regimen sliding scale   SubCutaneous three times a day before meals  insulin lispro (ADMELOG) corrective regimen sliding scale   SubCutaneous at bedtime  insulin lispro Injectable (ADMELOG) 10 Unit(s) SubCutaneous before breakfast  insulin lispro Injectable (ADMELOG) 10 Unit(s) SubCutaneous before lunch  insulin lispro Injectable (ADMELOG) 10 Unit(s) SubCutaneous before dinner  lactulose Syrup 15 Gram(s) Oral <User Schedule>  lactulose Syrup 10 Gram(s) Oral every 6 hours  levothyroxine 88 MICROGram(s) Oral daily  lisinopril 10 milliGRAM(s) Oral daily  meropenem  IVPB 1000 milliGRAM(s) IV Intermittent every 8 hours  mupirocin 2% Nasal 1 Application(s) Both Nostrils two times a day  pantoprazole   Suspension 40 milliGRAM(s) Oral daily  polyethylene glycol 3350 17 Gram(s) Oral at bedtime  predniSONE   Tablet 40 milliGRAM(s) Oral two times a day  rosuvastatin 10 milliGRAM(s) Oral at bedtime  senna 2 Tablet(s) Oral at bedtime  tacrolimus 1 milliGRAM(s) Oral <User Schedule>  tacrolimus 1 milliGRAM(s) Oral at bedtime  trimethoprim / sulfamethoxazole IVPB 320 milliGRAM(s) IV Intermittent every 8 hours  valACYclovir 500 milliGRAM(s) Oral every 12 hours        wife (Ludmila), daughter (Luz Marina) and son (Jose J).    Assessment: Pier[facility] was [facility] for day 1 cycle 2 mini H(O)P for PTLD but suffered from urosepsis with Klebsiella ESBL (Zosyn sensitive) and nasal corona virus.   Course further complicated by sustained high fevers & clinical diagnosis of PCP    Cystatin C eGFR: 40; standard eGFR calculation: 96    PMHx::  1) renal transplant 2012: left nephrectomy; renal transplant was secondary to DM, 2) AODM, 3) HLD, 4) hypothyroidism, 5) peripheral neuropathy  6) retroperitoneal fibrosis, 7) GERD, 8) HTN, 9) anxiety/depression, 10) obstructive sleep apnea, 11) osteomyelitis and E coli urosepsis (12/1/24 - 12/18/24)    Plan:  Heme:  Hold planned chemotherapy. given toxic presentation and sustained high fevers unlikely to continue anthracycline-based treatment.   Approved for Tafa/REV but given PCP Dx hold this therapy too.       ID:  mick (1/24/25 - ), valtrex given high dose steroids   For presumed PCP: prednisone 40mg bid (1/24/25 - ), bactrim 320mg q8 (1/24/25 - ): follow renal function given Cystatin C eGFR; LDH elevated.  galactomannan  and B-D glucan are pending - for PCP evaluation.    Please consult transplant ID: concerned with rise in Cr.    ID History  zosyn (1/17/25 - 1/20/25)  ertapenum (1/20/25 - 1/24/25)    Radiographs: Last chest/abd pelvis: 1/17/25, pelvic radiographs: pending; may also require MRI for avascular necrosis   Chest (1/23/25): concerning for PCP    Renal: continue ; please consult renal; as he is receiving high-dose steroids Cystatin C will be unreliable to e GFR.  Concerned that bactrim may be inducing renal dysfunction.     Endo: steroid induced hyperglycemia -- adjust insulin    PM&R consulted: for inability to weight bare before respiratory distress.    DVT prophylaxis: LWHx       FULL CODE    Over 60 minutes were spent in direct patient care and care coordination. .  Primary: [place]    Vital Signs Last 24 Hrs  T(C): 37.6 (26 Jan 2025 00:19), Max: 37.6 (26 Jan 2025 00:19)  T(F): 99.6 (26 Jan 2025 00:19), Max: 99.6 (26 Jan 2025 00:19)  HR: 78 (26 Jan 2025 00:19) (64 - 89)  BP: 126/56 (26 Jan 2025 00:19) (93/53 - 126/56)  BP(mean): --  RR: 18 (26 Jan 2025 00:19) (16 - 20)  SpO2: 94% (26 Jan 2025 00:19) (93% - 99%)    Parameters below as of 26 Jan 2025 00:19  Patient On (Oxygen Delivery Method): HFNC  O2 Flow (L/min): 60  O2 Concentration (%): 80    MEDICATIONS  (STANDING):  amLODIPine   Tablet 10 milliGRAM(s) Oral daily  buPROPion XL (24-Hour) . 300 milliGRAM(s) Oral daily  carvedilol 12.5 milliGRAM(s) Oral every 12 hours  chlorhexidine 4% Liquid 1 Application(s) Topical <User Schedule>  cloNIDine 0.1 milliGRAM(s) Oral three times a day  dextrose 5%. 1000 milliLiter(s) (50 mL/Hr) IV Continuous <Continuous>  dextrose 5%. 1000 milliLiter(s) (100 mL/Hr) IV Continuous <Continuous>  dextrose 50% Injectable 25 Gram(s) IV Push once  dextrose 50% Injectable 12.5 Gram(s) IV Push once  dextrose 50% Injectable 25 Gram(s) IV Push once  enoxaparin Injectable 40 milliGRAM(s) SubCutaneous <User Schedule>  escitalopram 10 milliGRAM(s) Oral daily  glucagon  Injectable 1 milliGRAM(s) IntraMuscular once  glucagon  Injectable 1 milliGRAM(s) IntraMuscular once  hydrALAZINE 100 milliGRAM(s) Oral every 8 hours  insulin glargine Injectable (LANTUS) 24 Unit(s) SubCutaneous at bedtime  insulin lispro (ADMELOG) corrective regimen sliding scale   SubCutaneous three times a day before meals  insulin lispro (ADMELOG) corrective regimen sliding scale   SubCutaneous at bedtime  insulin lispro Injectable (ADMELOG) 10 Unit(s) SubCutaneous before breakfast  insulin lispro Injectable (ADMELOG) 10 Unit(s) SubCutaneous before lunch  insulin lispro Injectable (ADMELOG) 10 Unit(s) SubCutaneous before dinner  lactulose Syrup 15 Gram(s) Oral <User Schedule>  lactulose Syrup 10 Gram(s) Oral every 6 hours  levothyroxine 88 MICROGram(s) Oral daily  lisinopril 10 milliGRAM(s) Oral daily  meropenem  IVPB 1000 milliGRAM(s) IV Intermittent every 8 hours  mupirocin 2% Nasal 1 Application(s) Both Nostrils two times a day  pantoprazole   Suspension 40 milliGRAM(s) Oral daily  polyethylene glycol 3350 17 Gram(s) Oral at bedtime  predniSONE   Tablet 40 milliGRAM(s) Oral two times a day  rosuvastatin 10 milliGRAM(s) Oral at bedtime  senna 2 Tablet(s) Oral at bedtime  tacrolimus 1 milliGRAM(s) Oral <User Schedule>  tacrolimus 1 milliGRAM(s) Oral at bedtime  trimethoprim / sulfamethoxazole IVPB 320 milliGRAM(s) IV Intermittent every 8 hours  valACYclovir 500 milliGRAM(s) Oral every 12 hours        Wife: Ludmila, daughter: Luz Marina and son: Jose J    Assessment: Piero was a scheduled [facility] for day 1 cycle 2 mini RCH(O)P for PTLD but complicated by PCP (b-D glucan: +) with high O2 demands    Cystatin C eGFR: 40; standard eGFR calculation: 96    PMHx::  1) renal transplant 2012: left nephrectomy; renal transplant was secondary to DM, 2) AODM, 3) HLD, 4) hypothyroidism, 5) peripheral neuropathy  6) retroperitoneal fibrosis, 7) GERD, 8) HTN, 9) anxiety/depression, 10) obstructive sleep apnea, 11) osteomyelitis and E coli urosepsis (12/1/24 - 12/18/24)    Plan:  Heme:  Hold planned chemotherapy. given toxic presentation and sustained high fevers unlikely to continue anthracycline-based treatment.   Approved for Tafa/REV but given PCP Dx hold this therapy too.       ID:   prednisone 40mg bid (1/24/25 - )  bactrim 320mg q8 (1/24/25 - ): follow renal function with high dose bactrim  Valtrex    ID History  zosyn (1/17/25 - 1/20/25)  ertapenum (1/20/25 - 1/24/25)  mick (1/24/25 - 1/26/25)    Radiographs: Last chest/abd pelvis: 1/17/25, pelvic radiographs: pending; may also require MRI for avascular necrosis   Chest (1/23/25): concerning for PCP    Renal: continue ; please consult renal; as he is receiving high-dose steroids Cystatin C will be unreliable to e GFR.  Concerned that bactrim may be inducing renal dysfunction.     Endo: steroid induced hyperglycemia -- adjust insulin    PM&R consulted: for inability to weight bare before respiratory distress.    DVT prophylaxis: LWHx       FULL CODE    Over 60 minutes were spent in direct patient care and care coordination. .  Primary: [place]    Vital Signs Last 24 Hrs  T(C): 37.6 (26 Jan 2025 00:19), Max: 37.6 (26 Jan 2025 00:19)  T(F): 99.6 (26 Jan 2025 00:19), Max: 99.6 (26 Jan 2025 00:19)  HR: 78 (26 Jan 2025 00:19) (64 - 89)  BP: 126/56 (26 Jan 2025 00:19) (93/53 - 126/56)  BP(mean): --  RR: 18 (26 Jan 2025 00:19) (16 - 20)  SpO2: 94% (26 Jan 2025 00:19) (93% - 99%)    Parameters below as of 26 Jan 2025 00:19  Patient On (Oxygen Delivery Method): HFNC  O2 Flow (L/min): 60  O2 Concentration (%): 80    MEDICATIONS  (STANDING):  amLODIPine   Tablet 10 milliGRAM(s) Oral daily  buPROPion XL (24-Hour) . 300 milliGRAM(s) Oral daily  carvedilol 12.5 milliGRAM(s) Oral every 12 hours  chlorhexidine 4% Liquid 1 Application(s) Topical <User Schedule>  cloNIDine 0.1 milliGRAM(s) Oral three times a day  dextrose 5%. 1000 milliLiter(s) (50 mL/Hr) IV Continuous <Continuous>  dextrose 5%. 1000 milliLiter(s) (100 mL/Hr) IV Continuous <Continuous>  dextrose 50% Injectable 25 Gram(s) IV Push once  dextrose 50% Injectable 12.5 Gram(s) IV Push once  dextrose 50% Injectable 25 Gram(s) IV Push once  enoxaparin Injectable 40 milliGRAM(s) SubCutaneous <User Schedule>  escitalopram 10 milliGRAM(s) Oral daily  glucagon  Injectable 1 milliGRAM(s) IntraMuscular once  glucagon  Injectable 1 milliGRAM(s) IntraMuscular once  hydrALAZINE 100 milliGRAM(s) Oral every 8 hours  insulin glargine Injectable (LANTUS) 24 Unit(s) SubCutaneous at bedtime  insulin lispro (ADMELOG) corrective regimen sliding scale   SubCutaneous three times a day before meals  insulin lispro (ADMELOG) corrective regimen sliding scale   SubCutaneous at bedtime  insulin lispro Injectable (ADMELOG) 10 Unit(s) SubCutaneous before breakfast  insulin lispro Injectable (ADMELOG) 10 Unit(s) SubCutaneous before lunch  insulin lispro Injectable (ADMELOG) 10 Unit(s) SubCutaneous before dinner  lactulose Syrup 15 Gram(s) Oral <User Schedule>  lactulose Syrup 10 Gram(s) Oral every 6 hours  levothyroxine 88 MICROGram(s) Oral daily  lisinopril 10 milliGRAM(s) Oral daily  meropenem  IVPB 1000 milliGRAM(s) IV Intermittent every 8 hours  mupirocin 2% Nasal 1 Application(s) Both Nostrils two times a day  pantoprazole   Suspension 40 milliGRAM(s) Oral daily  polyethylene glycol 3350 17 Gram(s) Oral at bedtime  predniSONE   Tablet 40 milliGRAM(s) Oral two times a day  rosuvastatin 10 milliGRAM(s) Oral at bedtime  senna 2 Tablet(s) Oral at bedtime  tacrolimus 1 milliGRAM(s) Oral <User Schedule>  tacrolimus 1 milliGRAM(s) Oral at bedtime  trimethoprim / sulfamethoxazole IVPB 320 milliGRAM(s) IV Intermittent every 8 hours  valACYclovir 500 milliGRAM(s) Oral every 12 hours    Wife: Ludmila, daughter: Luz Marina and son: Jose J    Assessment: [facility] day 1 cycle 2 mini RCH(O)P for PTLD but complicated by PCP (b-D glucan: +) with high O2 demands    Cystatin C eGFR: 40; standard eGFR calculation: 96    PMHx::  1) renal transplant 2012: left nephrectomy; renal transplant was secondary to DM, 2) AODM, 3) HLD, 4) hypothyroidism, 5) peripheral neuropathy  6) retroperitoneal fibrosis, 7) GERD, 8) HTN, 9) anxiety/depression, 10) obstructive sleep apnea, 11) osteomyelitis and E coli urosepsis (12/1/24 - 12/18/24)    Plan:  Heme:  Hold planned chemotherapy. given toxic presentation and sustained high fevers unlikely to continue anthracycline-based treatment.   Approved for Tafa/REV but given PCP Dx hold this therapy too.       ID:   prednisone 40mg bid (1/24/25 - )  bactrim 320mg q8 (1/24/25 - ): follow renal function with high dose bactrim  Valtrex    ID History  zosyn (1/17/25 - 1/20/25)  ertapenum (1/20/25 - 1/24/25)  mick (1/24/25 - 1/26/25)    Radiographs: Last chest/abd pelvis: 1/17/25, pelvic radiographs: pending; may also require MRI for avascular necrosis   Chest (1/23/25): concerning for PCP    Renal: continue ; please consult renal; as he is receiving high-dose steroids Cystatin C will be unreliable to e GFR.  Concerned that bactrim may be inducing renal dysfunction.     Endo: steroid induced hyperglycemia -- adjust insulin    PM&R consulted: for inability to weight bare before respiratory distress.    DVT prophylaxis: LWHx   FULL CODE  Over 60 minutes were spent in direct patient care and care coordination.    Addendum:  5:51 PM  Confirmed with his wife, Ludmila, that Piero remains full code at this time.  Piero has become encephalopathic with his metabolic disturbances. This AM he was of clearer mind, while eating breakfast, and did not discuss wishing to make any changes in his code status.  Previous conversations (during the past weeks) were always full code.

## 2025-01-26 NOTE — PROVIDER CONTACT NOTE (OTHER) - NAME OF MD/NP/PA/DO NOTIFIED:
Salty Russo
Vicente Mixon, NP
ALBERTO Hannon
ALBERTO Membreno
ALBERTO Membreno
ALBERTO Hannon
RICHIE Rae
TYRONE Mixon NP
ALBERTO Russo
RICHIE Zhang
ALBERTO Russo
Vicente Mixon NP

## 2025-01-26 NOTE — CONSULT NOTE ADULT - SUBJECTIVE AND OBJECTIVE BOX
HPI:  M 70 y/o M PMHx renal transplant 2012 (2/2 DM, s/p left nephrectomy), peripheral neuropathy, hypothyroidism, retroperitoneal fibrosis, HTN, HLD, GERD, anxiety/depression, ANGELIKA and recent admission at [facility] for sacral osteomyelitis and ESBL.coli urosepsis discharged on 12/18/24 to rehab. While admitted to [place] was noted to have hypercalcemia and liver masses which were biopsied on 12/16/24, biopsy revealed DLBCL. Patient received R mini CHOP on 12/28 now admitted for cycle #2 Rmini CHOP, upon admission patient is febrile will hold chemotherapy today.  (17 Jan 2025 12:09)      DIABETES HX: INCOMPLETE NOTE  - History/diagnosis:  - Microvascular complications:   [  ] nephropathy, [  ] retinopathy, [  ] neuropathy  - Macrovascular complications: [  ] CAD,  [  ] CVA  - Follows with:  - A1C with Estimated Average Glucose Result: 7.8 % (12-29-24)  - Home regimen:  - Adherence:  - Previous meds:  - Blood sugar:  - Hypoglycemia episodes:  - Diet/lifestyle:  - Symptoms:  - Family history:    PAST MEDICAL & SURGICAL HISTORY:  Diabetes Mellitus Type II  Insulin pump (2010)      GERD (gastroesophageal reflux disease)      Depression      Hypothyroidism      Hyperlipidemia      Kidney transplanted  2012      Hypertension      ANGELIKA (obstructive sleep apnea)      Peripheral neuropathy      Shoulder fracture  Left.      Hypothyroidism      History of renal transplant      DLBCL (diffuse large B cell lymphoma)      Infectious disease      Type 2 diabetes mellitus      Hypothyroidism      Transplanted kidney      History of colonoscopy      S/P kidney transplant  2012      S/P cholecystectomy      Retroperitoneal fibrosis  s/p surgery      AV fistula  Right arm      S/P right cataract extraction      S/P left cataract extraction      H/O unilateral nephrectomy  Left          FAMILY HISTORY:  MI (myocardial infarction)    Family history of obesity (Sibling)        Social History:    Outpatient medications:  buPROPion 300 mg/24 hours (XL) oral tablet, extended release (01-21-25 @ 23:08)  cloNIDine 0.1 mg oral tablet (01-17-25 @ 12:08)  Coreg 12.5 mg oral tablet (01-17-25 @ 12:08)  Crestor 10 mg oral tablet (01-17-25 @ 12:08)  escitalopram 10 mg oral tablet (01-17-25 @ 12:08)  hydrALAZINE 100 mg oral tablet (01-17-25 @ 12:08)  Insulin Glargine (01-17-25 @ 12:08)  levothyroxine 88 mcg (0.088 mg) oral tablet (01-17-25 @ 12:08)  lisinopril 10 mg oral tablet (01-17-25 @ 12:08)  polyethylene glycol 3350 oral powder for reconstitution (01-17-25 @ 12:08)  predniSONE 20 mg oral tablet (01-17-25 @ 12:08)  tacrolimus 1 mg oral capsule (01-17-25 @ 12:08)  tacrolimus 1 mg oral capsule (01-17-25 @ 12:08)      Inpatient medications:  MEDICATIONS  (STANDING):  amLODIPine   Tablet 10 milliGRAM(s) Oral daily  buPROPion XL (24-Hour) . 300 milliGRAM(s) Oral daily  carvedilol 12.5 milliGRAM(s) Oral every 12 hours  chlorhexidine 4% Liquid 1 Application(s) Topical <User Schedule>  cloNIDine 0.1 milliGRAM(s) Oral three times a day  dextrose 5%. 1000 milliLiter(s) (100 mL/Hr) IV Continuous <Continuous>  dextrose 5%. 1000 milliLiter(s) (50 mL/Hr) IV Continuous <Continuous>  dextrose 50% Injectable 25 Gram(s) IV Push once  dextrose 50% Injectable 25 Gram(s) IV Push once  dextrose 50% Injectable 25 Gram(s) IV Push once  enoxaparin Injectable 40 milliGRAM(s) SubCutaneous <User Schedule>  escitalopram 10 milliGRAM(s) Oral daily  glucagon  Injectable 1 milliGRAM(s) IntraMuscular once  glucagon  Injectable 1 milliGRAM(s) IntraMuscular once  hydrALAZINE 100 milliGRAM(s) Oral every 8 hours  insulin glargine Injectable (LANTUS) 24 Unit(s) SubCutaneous at bedtime  insulin lispro (ADMELOG) corrective regimen sliding scale   SubCutaneous three times a day before meals  insulin lispro (ADMELOG) corrective regimen sliding scale   SubCutaneous at bedtime  insulin lispro Injectable (ADMELOG) 10 Unit(s) SubCutaneous before breakfast  insulin lispro Injectable (ADMELOG) 10 Unit(s) SubCutaneous before lunch  insulin lispro Injectable (ADMELOG) 10 Unit(s) SubCutaneous before dinner  lactulose Syrup 15 Gram(s) Oral <User Schedule>  lactulose Syrup 10 Gram(s) Oral every 6 hours  levothyroxine 88 MICROGram(s) Oral daily  meropenem  IVPB 1000 milliGRAM(s) IV Intermittent every 8 hours  mupirocin 2% Nasal 1 Application(s) Both Nostrils two times a day  pantoprazole   Suspension 40 milliGRAM(s) Oral daily  polyethylene glycol 3350 17 Gram(s) Oral at bedtime  predniSONE   Tablet 40 milliGRAM(s) Oral two times a day  rosuvastatin 10 milliGRAM(s) Oral at bedtime  senna 2 Tablet(s) Oral at bedtime  trimethoprim / sulfamethoxazole IVPB 320 milliGRAM(s) IV Intermittent every 8 hours  valACYclovir 500 milliGRAM(s) Oral every 12 hours    MEDICATIONS  (PRN):  acetaminophen     Tablet .. 650 milliGRAM(s) Oral every 6 hours PRN Temp greater or equal to 38C (100.4F), Mild Pain (1 - 3)  dextrose Oral Gel 15 Gram(s) Oral once PRN Blood Glucose LESS THAN 70 milliGRAM(s)/deciliter  polyethylene glycol 3350 17 Gram(s) Oral daily PRN for constipation  sodium chloride 0.9% lock flush 10 milliLiter(s) IV Push every 1 hour PRN Pre/post blood products, medications, blood draw, and to maintain line patency      Allergies    No Known Allergies    Intolerances      Review of Systems:  Constitutional: No fever  Eyes: No blurry vision  Neuro: No tremors  HEENT: No pain  Cardiovascular: No chest pain, palpitations  Respiratory: No SOB, no cough  GI: No nausea, vomiting, abdominal pain  : No dysuria  Skin: no rash  Psych: no depression  Endocrine: no polyuria, polydipsia  Hem/lymph: no swelling  Osteoporosis: no fractures    ALL OTHER SYSTEMS REVIEWED AND NEGATIVE    PHYSICAL EXAM:  VITALS: T(C): 36.8 (01-26-25 @ 06:00)  T(F): 98.3 (01-26-25 @ 06:00), Max: 99.6 (01-26-25 @ 00:19)  HR: 74 (01-26-25 @ 09:49) (66 - 78)  BP: 146/73 (01-26-25 @ 06:00) (105/58 - 146/73)  RR:  (18 - 18)  SpO2:  (93% - 96%)  Wt(kg): --  GENERAL: NAD, well-groomed, well-developed  EYES: No proptosis, no lid lag, anicteric  HEENT:  Atraumatic, Normocephalic, moist mucous membranes  RESPIRATORY: Normal respiratory effort; no audible wheezing  SKIN: Dry, intact, No rashes or lesions  MUSCULOSKELETAL: Full range of motion, normal strength  NEURO: sensation intact, extraocular movements intact, no tremor  PSYCH: Alert and oriented x 3, normal affect, normal mood  CUSHING'S SIGNS: no striae                          8.1    11.69 )-----------( 552      ( 26 Jan 2025 06:53 )             24.7       01-26    124[L]  |  91[L]  |  37[H]  ----------------------------<  471[HH]  5.5[H]   |  21[L]  |  1.14    eGFR: 70    Ca    9.2      01-26  Mg     1.8     01-26  Phos  2.8     01-26    TPro  5.2[L]  /  Alb  2.2[L]  /  TBili  0.1[L]  /  DBili  x   /  AST  39  /  ALT  41  /  AlkPhos  95  01-26      A1C with Estimated Average Glucose Result: 7.8 % (12-29-24 @ 07:07)  A1C with Estimated Average Glucose Result: 7.7 % (12-28-24 @ 10:06)  A1C with Estimated Average Glucose Result: 7.4 % (11-30-24 @ 06:40)      CAPILLARY BLOOD GLUCOSE      POCT Blood Glucose.: 453 mg/dL (26 Jan 2025 08:38)  POCT Blood Glucose.: 440 mg/dL (26 Jan 2025 08:36)  POCT Blood Glucose.: 394 mg/dL (26 Jan 2025 01:50)  POCT Blood Glucose.: 418 mg/dL (25 Jan 2025 21:04)  POCT Blood Glucose.: 456 mg/dL (25 Jan 2025 17:06)  POCT Blood Glucose.: 479 mg/dL (25 Jan 2025 16:59)  POCT Blood Glucose.: 457 mg/dL (25 Jan 2025 12:11)  POCT Blood Glucose.: 472 mg/dL (25 Jan 2025 12:09)                   HPI:  M 68 y/o M PMHx renal transplant 2012 (2/2 DM, s/p left nephrectomy), peripheral neuropathy, hypothyroidism, retroperitoneal fibrosis, HTN, HLD, GERD, anxiety/depression, ANGELIKA and recent admission at [facility] for sacral osteomyelitis and ESBL.coli urosepsis discharged on 12/18/24 to rehab. While admitted to [place] was noted to have hypercalcemia and liver masses which were biopsied on 12/16/24, biopsy revealed DLBCL. Patient received R mini CHOP on 12/28 now admitted for cycle #2 Rmini CHOP, upon admission patient is febrile will hold chemotherapy today.  (17 Jan 2025 12:09)      DIABETES HX:   - History/diagnosis: T2DM since age 32  - Microvascular complications:  reports history of neuropathy, retinopathy, nephropathy leading to ESRD and eventual renal transplant  - Macrovascular complications: denies history of CAD or CVA  - Follows with: Dr. Romero in [place]  - A1C with Estimated Average Glucose Result: 7.8 % (12-29-24)  - Home regimen: Came from rehab, there he was on Lantus 38 units, Admelog 10 units with correction scale  - Blood sugar: variable at rehab per paper chart  - Hypoglycemia episodes: denies    PAST MEDICAL & SURGICAL HISTORY:  Diabetes Mellitus Type II  Insulin pump (2010)  GERD (gastroesophageal reflux disease)  Depression  Hypothyroidism  Hyperlipidemia  Kidney transplanted  Hypertension  ANGELIKA (obstructive sleep apnea)  Peripheral neuropathy  Shoulder fracture  Left.  Hypothyroidism  History of renal transplant  DLBCL (diffuse large B cell lymphoma)  Infectious disease  Type 2 diabetes mellitus  Hypothyroidism  Transplanted kidney  History of colonoscopy  S/P kidney transplant  2012  S/P cholecystectomy  Retroperitoneal fibrosis  s/p surgery  AV fistula  Right arm  S/P right cataract extraction  S/P left cataract extraction  H/O unilateral nephrectomy  Left      FAMILY HISTORY:  MI (myocardial infarction)  Family history of obesity (Sibling)      Outpatient medications:  buPROPion 300 mg/24 hours (XL) oral tablet, extended release (01-21-25 @ 23:08)  cloNIDine 0.1 mg oral tablet (01-17-25 @ 12:08)  Coreg 12.5 mg oral tablet (01-17-25 @ 12:08)  Crestor 10 mg oral tablet (01-17-25 @ 12:08)  escitalopram 10 mg oral tablet (01-17-25 @ 12:08)  hydrALAZINE 100 mg oral tablet (01-17-25 @ 12:08)  Insulin Glargine (01-17-25 @ 12:08)  levothyroxine 88 mcg (0.088 mg) oral tablet (01-17-25 @ 12:08)  lisinopril 10 mg oral tablet (01-17-25 @ 12:08)  polyethylene glycol 3350 oral powder for reconstitution (01-17-25 @ 12:08)  predniSONE 20 mg oral tablet (01-17-25 @ 12:08)  tacrolimus 1 mg oral capsule (01-17-25 @ 12:08)  tacrolimus 1 mg oral capsule (01-17-25 @ 12:08)      Inpatient medications:  MEDICATIONS  (STANDING):  amLODIPine   Tablet 10 milliGRAM(s) Oral daily  buPROPion XL (24-Hour) . 300 milliGRAM(s) Oral daily  carvedilol 12.5 milliGRAM(s) Oral every 12 hours  chlorhexidine 4% Liquid 1 Application(s) Topical <User Schedule>  cloNIDine 0.1 milliGRAM(s) Oral three times a day  dextrose 5%. 1000 milliLiter(s) (100 mL/Hr) IV Continuous <Continuous>  dextrose 5%. 1000 milliLiter(s) (50 mL/Hr) IV Continuous <Continuous>  dextrose 50% Injectable 25 Gram(s) IV Push once  dextrose 50% Injectable 25 Gram(s) IV Push once  dextrose 50% Injectable 25 Gram(s) IV Push once  enoxaparin Injectable 40 milliGRAM(s) SubCutaneous <User Schedule>  escitalopram 10 milliGRAM(s) Oral daily  glucagon  Injectable 1 milliGRAM(s) IntraMuscular once  glucagon  Injectable 1 milliGRAM(s) IntraMuscular once  hydrALAZINE 100 milliGRAM(s) Oral every 8 hours  insulin glargine Injectable (LANTUS) 24 Unit(s) SubCutaneous at bedtime  insulin lispro (ADMELOG) corrective regimen sliding scale   SubCutaneous three times a day before meals  insulin lispro (ADMELOG) corrective regimen sliding scale   SubCutaneous at bedtime  insulin lispro Injectable (ADMELOG) 10 Unit(s) SubCutaneous before breakfast  insulin lispro Injectable (ADMELOG) 10 Unit(s) SubCutaneous before lunch  insulin lispro Injectable (ADMELOG) 10 Unit(s) SubCutaneous before dinner  lactulose Syrup 15 Gram(s) Oral <User Schedule>  lactulose Syrup 10 Gram(s) Oral every 6 hours  levothyroxine 88 MICROGram(s) Oral daily  meropenem  IVPB 1000 milliGRAM(s) IV Intermittent every 8 hours  mupirocin 2% Nasal 1 Application(s) Both Nostrils two times a day  pantoprazole   Suspension 40 milliGRAM(s) Oral daily  polyethylene glycol 3350 17 Gram(s) Oral at bedtime  predniSONE   Tablet 40 milliGRAM(s) Oral two times a day  rosuvastatin 10 milliGRAM(s) Oral at bedtime  senna 2 Tablet(s) Oral at bedtime  trimethoprim / sulfamethoxazole IVPB 320 milliGRAM(s) IV Intermittent every 8 hours  valACYclovir 500 milliGRAM(s) Oral every 12 hours    MEDICATIONS  (PRN):  acetaminophen     Tablet .. 650 milliGRAM(s) Oral every 6 hours PRN Temp greater or equal to 38C (100.4F), Mild Pain (1 - 3)  dextrose Oral Gel 15 Gram(s) Oral once PRN Blood Glucose LESS THAN 70 milliGRAM(s)/deciliter  polyethylene glycol 3350 17 Gram(s) Oral daily PRN for constipation  sodium chloride 0.9% lock flush 10 milliLiter(s) IV Push every 1 hour PRN Pre/post blood products, medications, blood draw, and to maintain line patency      Allergies    No Known Allergies    Intolerances      Review of Systems:  Constitutional: No fever  Eyes: No blurry vision  Neuro: No tremors  HEENT: No pain  Cardiovascular: No chest pain, palpitations  Respiratory: No SOB, no cough  GI: No nausea, vomiting, abdominal pain  : No dysuria  Skin: no rash  Psych: no depression  Endocrine: no polyuria, polydipsia  Hem/lymph: no swelling  Osteoporosis: no fractures    ALL OTHER SYSTEMS REVIEWED AND NEGATIVE    PHYSICAL EXAM:  VITALS: T(C): 36.8 (01-26-25 @ 06:00)  T(F): 98.3 (01-26-25 @ 06:00), Max: 99.6 (01-26-25 @ 00:19)  HR: 74 (01-26-25 @ 09:49) (66 - 78)  BP: 146/73 (01-26-25 @ 06:00) (105/58 - 146/73)  RR:  (18 - 18)  SpO2:  (93% - 96%)  Wt(kg): 75.6 kg  GENERAL: NAD, well-groomed, well-developed  EYES: No proptosis, no lid lag, anicteric  HEENT:  Atraumatic, Normocephalic, moist mucous membranes  RESPIRATORY: Normal respiratory effort; no audible wheezing, on supplemental oxygen  SKIN: Dry, intact, No rashes or lesions  MUSCULOSKELETAL: Full range of motion, normal strength  NEURO: sensation intact, extraocular movements intact, no tremor  PSYCH: Alert and oriented, normal affect, normal mood  CUSHING'S SIGNS: no striae                          8.1    11.69 )-----------( 552      ( 26 Jan 2025 06:53 )             24.7       01-26    124[L]  |  91[L]  |  37[H]  ----------------------------<  471[HH]  5.5[H]   |  21[L]  |  1.14    eGFR: 70    Ca    9.2      01-26  Mg     1.8     01-26  Phos  2.8     01-26    TPro  5.2[L]  /  Alb  2.2[L]  /  TBili  0.1[L]  /  DBili  x   /  AST  39  /  ALT  41  /  AlkPhos  95  01-26      A1C with Estimated Average Glucose Result: 7.8 % (12-29-24 @ 07:07)  A1C with Estimated Average Glucose Result: 7.7 % (12-28-24 @ 10:06)  A1C with Estimated Average Glucose Result: 7.4 % (11-30-24 @ 06:40)      CAPILLARY BLOOD GLUCOSE      POCT Blood Glucose.: 453 mg/dL (26 Jan 2025 08:38)  POCT Blood Glucose.: 440 mg/dL (26 Jan 2025 08:36)  POCT Blood Glucose.: 394 mg/dL (26 Jan 2025 01:50)  POCT Blood Glucose.: 418 mg/dL (25 Jan 2025 21:04)  POCT Blood Glucose.: 456 mg/dL (25 Jan 2025 17:06)  POCT Blood Glucose.: 479 mg/dL (25 Jan 2025 16:59)  POCT Blood Glucose.: 457 mg/dL (25 Jan 2025 12:11)  POCT Blood Glucose.: 472 mg/dL (25 Jan 2025 12:09)

## 2025-01-26 NOTE — PROGRESS NOTE ADULT - ASSESSMENT
Patient is a 69 year old male with PMH of renal transplant 2012 (2/2 DM, s/p left nephrectomy), peripheral neuropathy, hypothyroidism, retroperitoneal fibrosis, HTN, HLD, GERD, anxiety/depression, ANGELIKA and recent admission at [facility] for ESBL E.coli urosepsis, imaging with ?sacral OM though pt with no sacral wound suspected findings likely related to mets, he was discharged on 12/18/24 to rehab, recently diagnosed DLBCL while admitted to [place] when he was noted to have hypercalcemia and liver masses s/p biopsy 12/16/24, now s/p R mini CHOP on 12/28 who was admitted 1/17 for cycle #2 Rmini CHOP, upon admission patient febrile and chemotherapy was held for now.  Prior cultures reviewed, history of ESBL Klebsiella in Ucx 12/31/24, ESBL E.coli 11/29/24 Ucx     Fevers- post viral pneumonia, now with concern for PCP pneumonia  Viral URI d/t coronavirus (not COVID)  - 1/17 RVP with Coronavirus (229E,HKU1,NL63,OC43) detected   - 1/17 CT Chest with extensive new b/l ground glass nodular opacities, upper lobe dominant - possible pna; small R and trace L effusions  - 1/17 CTAP decreased R hepatic mass since 12/9/24; known splenic mass; changes likely c/w retroperitoneal fibrosis   - UA with some pyuria, no bacteria, Ucx with >3 organisms - suspect contaminated specimen    - repeat Ucx both with low colony count of yeasts/C.albicans, likely contaminant, no need to treat  - LUE PICC line with no sign of infection, no sign of SSTI, no rashes   - wound care eval noted for sacral and ischial DTI, no open wound noted  - MRSA PCR screen negative (+MSSA)  - s/p zosyn 1/17-1/20, escalated to ertapenem 1/20pm due to fevers   - 1/22 CXR mild pulm vasc congestion, small L effusion, no focal consolidation  - 1/17, 1/19, 1/21 Bcx remain NGTD  - 1/23 CTAP with no significant changes, diffuse erosive changes in sacrum with soft tissue infiltration similar to prior, low suspicion for OM given no open wound in area  - 1/23 CT Chest with diffuse b/l patchy ggo with central predominance increased from 1/17/25 - edema vs pneumonia; unchanged LLL round atelectasis; small L pleural effusion   - imaging reviewed, agree with c/f PCP based on repeat CT, ongoing fevers, worsening hypoxia now requiring HFNC, LDH elevated/increased, was on steroids and iso malignancy, noted vancomycin added overnight, s/p hydrocortisone 1/22-1/23 1/25 afeb 24 hours, ua neg , blood cx neg to date   1/26 fungitell > 500 pjp     antibx  zosyn 1/17-1/20  ertapenem 1/20- 1/24  vanc X1 1/24  meropenem 1/24-   bactrim + prednisone 1/24-  Recommendations:     aspergillus ag, in process   Send sputum for PCP PCR if able to expectorate   Interventional Pulm to eval for possible bronchoscopy     cont  Bactrim 320mg IV Q8h (based on pts weight, d/w ID pharm) for now    prednisone 40mg PO BID x5d then 40mg daily x5d followed by 20mg daily for 11 days   meropenem 1g IV Q8h for now in setting of low albumin day 3 - can likely stop   Monitor renal function closely    valtrex ppx    Monitor temps/CBC  Supportive care  Aspiration precautions  Wound care, offloading as able, nutrition  Continue rest of care per primary team     d/w Dr Valentin Gonzalez Infectious Disease  Available on Microsoft TEAMS - *PREFERRED*  [phone]  After 5pm on weekdays and all day on weekends - please call [phone]     Thank you for consulting us and involving us in the management of this patients case. In addition to reviewing history, imaging, documents, labs, microbiology, took into account antibiotic stewardship, local antibiogram and infection control strategies and potential transmission issues. Patient is a 69 year old male with PMH of renal transplant 2012 (2/2 DM, s/p left nephrectomy), peripheral neuropathy, hypothyroidism, retroperitoneal fibrosis, HTN, HLD, GERD, anxiety/depression, ANGELIKA and recent admission at [facility] for ESBL E.coli urosepsis, imaging with ?sacral OM though pt with no sacral wound suspected findings likely related to mets, he was discharged on 12/18/24 to rehab, recently diagnosed DLBCL while admitted to [place] when he was noted to have hypercalcemia and liver masses s/p biopsy 12/16/24, now s/p R mini CHOP on 12/28 who was admitted 1/17 for cycle #2 Rmini CHOP, upon admission patient febrile and chemotherapy was held for now.  Prior cultures reviewed, history of ESBL Klebsiella in Ucx 12/31/24, ESBL E.coli 11/29/24 Ucx     Fevers- post viral pneumonia, now with concern for PCP pneumonia  Viral URI d/t coronavirus (not COVID)  - 1/17 RVP with Coronavirus (229E,HKU1,NL63,OC43) detected   - 1/17 CT Chest with extensive new b/l ground glass nodular opacities, upper lobe dominant - possible pna; small R and trace L effusions  - 1/17 CTAP decreased R hepatic mass since 12/9/24; known splenic mass; changes likely c/w retroperitoneal fibrosis   - UA with some pyuria, no bacteria, Ucx with >3 organisms - suspect contaminated specimen    - repeat Ucx both with low colony count of yeasts/C.albicans, likely contaminant, no need to treat  - LUE PICC line with no sign of infection, no sign of SSTI, no rashes   - wound care eval noted for sacral and ischial DTI, no open wound noted  - MRSA PCR screen negative (+MSSA)  - s/p zosyn 1/17-1/20, escalated to ertapenem 1/20pm due to fevers   - 1/22 CXR mild pulm vasc congestion, small L effusion, no focal consolidation  - 1/17, 1/19, 1/21 Bcx remain NGTD  - 1/23 CTAP with no significant changes, diffuse erosive changes in sacrum with soft tissue infiltration similar to prior, low suspicion for OM given no open wound in area  - 1/23 CT Chest with diffuse b/l patchy ggo with central predominance increased from 1/17/25 - edema vs pneumonia; unchanged LLL round atelectasis; small L pleural effusion   - imaging reviewed, agree with c/f PCP based on repeat CT, ongoing fevers, worsening hypoxia now requiring HFNC, LDH elevated/increased, was on steroids and iso malignancy, noted vancomycin added overnight, s/p hydrocortisone 1/22-1/23 1/25 afeb 24 hours, ua neg , blood cx neg to date   1/26 fungitell > 500 pjp     antibx  zosyn 1/17-1/20  ertapenem 1/20- 1/24  vanc X1 1/24  meropenem 1/24-   bactrim + prednisone 1/24-  Recommendations:     aspergillus ag, in process   Send sputum for PCP PCR if able to expectorate   Interventional Pulm to eval for possible bronchoscopy     cont  Bactrim 320mg IV Q8h (based on pts weight, d/w ID pharm) for now    prednisone 40mg PO BID x5d then 40mg daily x5d followed by 20mg daily for 11 days   meropenem 1g IV Q8h for now in setting of low albumin   Monitor renal function closely    valtrex ppx    Monitor temps/CBC  Supportive care  Aspiration precautions  Wound care, offloading as able, nutrition  Continue rest of care per primary team     d/w Dr Valentin Gonzalez Infectious Disease  Available on Microsoft TEAMS - *PREFERRED*  [phone]  After 5pm on weekdays and all day on weekends - please call [phone]     Thank you for consulting us and involving us in the management of this patients case. In addition to reviewing history, imaging, documents, labs, microbiology, took into account antibiotic stewardship, local antibiogram and infection control strategies and potential transmission issues. Patient is a 69 year old male with PMH of renal transplant 2012 (2/2 DM, s/p left nephrectomy), peripheral neuropathy, hypothyroidism, retroperitoneal fibrosis, HTN, HLD, GERD, anxiety/depression, ANGELIKA and recent admission at [facility] for ESBL E.coli urosepsis, imaging with ?sacral OM though pt with no sacral wound suspected findings likely related to mets, he was discharged on 12/18/24 to rehab, recently diagnosed DLBCL while admitted to [place] when he was noted to have hypercalcemia and liver masses s/p biopsy 12/16/24, now s/p R mini CHOP on 12/28 who was admitted 1/17 for cycle #2 Rmini CHOP, upon admission patient febrile and chemotherapy was held for now.  Prior cultures reviewed, history of ESBL Klebsiella in Ucx 12/31/24, ESBL E.coli 11/29/24 Ucx     Fevers- post viral pneumonia, now with concern for PCP pneumonia  Viral URI d/t coronavirus (not COVID)  - 1/17 RVP with Coronavirus (229E,HKU1,NL63,OC43) detected   - 1/17 CT Chest with extensive new b/l ground glass nodular opacities, upper lobe dominant - possible pna; small R and trace L effusions  - 1/17 CTAP decreased R hepatic mass since 12/9/24; known splenic mass; changes likely c/w retroperitoneal fibrosis   - UA with some pyuria, no bacteria, Ucx with >3 organisms - suspect contaminated specimen    - repeat Ucx both with low colony count of yeasts/C.albicans, likely contaminant, no need to treat  - LUE PICC line with no sign of infection, no sign of SSTI, no rashes   - wound care eval noted for sacral and ischial DTI, no open wound noted  - MRSA PCR screen negative (+MSSA)  - s/p zosyn 1/17-1/20, escalated to ertapenem 1/20pm due to fevers   - 1/22 CXR mild pulm vasc congestion, small L effusion, no focal consolidation  - 1/17, 1/19, 1/21 Bcx remain NGTD  - 1/23 CTAP with no significant changes, diffuse erosive changes in sacrum with soft tissue infiltration similar to prior, low suspicion for OM given no open wound in area  - 1/23 CT Chest with diffuse b/l patchy ggo with central predominance increased from 1/17/25 - edema vs pneumonia; unchanged LLL round atelectasis; small L pleural effusion   - imaging reviewed, agree with c/f PCP based on repeat CT, ongoing fevers, worsening hypoxia now requiring HFNC, LDH elevated/increased, was on steroids and iso malignancy, noted vancomycin added overnight, s/p hydrocortisone 1/22-1/23 1/25 afeb 24 hours, ua neg , blood cx neg to date   1/26 fungitell > 500 pjp     antibx  zosyn 1/17-1/20  ertapenem 1/20- 1/24  vanc X1 1/24  meropenem 1/24-   bactrim + prednisone 1/24-  Recommendations:     aspergillus ag, in process   Send sputum for PCP PCR if able to expectorate   Interventional Pulm to eval for possible bronchoscopy     cont  Bactrim 320mg IV Q8h (based on pts weight, d/w ID pharm) for now    prednisone 40mg PO BID x5d then 40mg daily x5d followed by 20mg daily for 11 days   meropenem 1g IV Q8h for now in setting of low albumin can stop   Monitor renal function closely    valtrex ppx    Monitor temps/CBC  Supportive care  Aspiration precautions  Wound care, offloading as able, nutrition  Continue rest of care per primary team     d/w Dr Valentin Gonzalez Infectious Disease  Available on [employer] TEAMS - *PREFERRED*  [phone]  After 5pm on weekdays and all day on weekends - please call [phone]     Thank you for consulting us and involving us in the management of this patients case. In addition to reviewing history, imaging, documents, labs, microbiology, took into account antibiotic stewardship, local antibiogram and infection control strategies and potential transmission issues.

## 2025-01-26 NOTE — CONSULT NOTE ADULT - ASSESSMENT
INCOMPLETE NOTE  The patient is a 69y Male with PMH of ***  Endocrinology consulted for ***    #Uncontrolled Type *** Diabetes Mellitus   - Follows with:  - A1C with Estimated Average Glucose Result: 7.8 % (12-29-24)  - home regimen:   - eGFR: 70 mL/min/1.73m2 (01-26-25)  -   - glucose ***, no evidence of DKA on labs      INPATIENT PLAN:  At this time, patient hyperglycemic to the 400s since yesterday morning. Recommend NPO status, moderate-dose correction insulin q4h until glucose is under 250, IV fluids as tolerated.   - Recommend Lantus *** units QHS   - Recommend Admelog *** units TID before meals (HOLD if NPO or not eating)  - Recommend Low dose admelog correction scale TIDQAC and separate low dose scale QHS  - Please check FSG before meals and QHS, or q6h while NPO  - Inpatient glucose goals: <140 pre-meal, <180 random  - consistent carb diet when eating  - nutrition consult placed      DISCHARGE PLANNING:  - Discharge recs pending clinical course  - will need Endocrinology follow up. Please provide clinic info in DC paperwork for patient to make an appointment:    #Hypertension  - Goal BP <130/80  - on   - Management as per primary team  - check urine albumin level as outpatient    #Hyperlipidemia  - LDL goal <70  - Last LDL: - on   - check lipid panel as outpatient on a yearly basis    Pending discussion with attending physician.  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   Endocrinology consulted for hyperglycemia.    #Uncontrolled Type 2 Diabetes Mellitus with  #Steroid-induced hyperglycemia  - Follows with: Dr. Romero  - A1C with Estimated Average Glucose Result: 7.8 % (12-29-24)  - home regimen: Came from rehab, there he was on Lantus 38 units, Admelog 10 units with correction scale  - eGFR: 70 mL/min/1.73m2 (01-26-25)  - glucose grossly elevated, no evidence of DKA on labs      INPATIENT PLAN:  At this time, patient hyperglycemic to the 400s since yesterday morning. Recommend NPO status, moderate-dose correction insulin q4h until glucose is under 250, IV fluids as tolerated.   In the past 24 hours, patient received 98 units of insulin and still grossly hyperglycemic. However, of note, patient was hypoglycemic on 1/24 in the morning after receiving 38 units of Lantus the night before. Therefore, will proceed with caution.  - Recommend Lantus 36 units QHS   - Once glucose normalized,recommend restarting diet and recommend Admelog 18 units TID before meals (HOLD if NPO or not eating). Taper downwards preemptively if steroids are being reduced to avoid hypoglycemia.   - Recommend moderate dose admelog correction scale TIDQAC and separate moderate dose scale QHS  - Please check FSG before meals and QHS, or q6h while NPO  - Inpatient glucose goals: <140 pre-meal, <180 random  - consistent carb diet when eating      DISCHARGE PLANNING:  - Discharge recs pending clinical course and depending on steroids. Will need basal/bolus.   - will need Endocrinology follow up routinely with his endocrinologist Dr. Romero.     #Hypothyroidism  Home regimen: Levothyroxine 88 mcg daily  12/31/24 TSH 0.55 FT4 1.2  Continue levothyroxine 88 mcg daily    #Hypertension  - Goal BP <130/80  - on hydralazine, clonidine, coreg  - Management as per primary team  - check urine albumin level as outpatient    #Hyperlipidemia  - LDL goal <70  - on rosuvastatin 10 mg daily  - check lipid panel as outpatient on a yearly basis    Pending discussion with attending physician.  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   Endocrinology consulted for hyperglycemia.    #Uncontrolled Type 2 Diabetes Mellitus with  #Steroid-induced hyperglycemia  - Follows with: Dr. Romero  - A1C with Estimated Average Glucose Result: 7.8 % (12-29-24)  - home regimen: Came from rehab, there he was on Lantus 38 units, Admelog 10 units with correction scale  - eGFR: 70 mL/min/1.73m2 (01-26-25)  - glucose grossly elevated, no evidence of DKA on labs      INPATIENT PLAN:  At this time, patient hyperglycemic to the 400s since yesterday morning. Recommend NPO status, moderate-dose correction insulin q4h until glucose is under 250, IV fluids as tolerated.   In the past 24 hours, patient received 98 units of insulin and still grossly hyperglycemic. However, of note, patient was hypoglycemic on 1/24 in the morning after receiving 38 units of Lantus the night before. Therefore, will proceed with caution.  - Recommend Lantus 36 units QHS   - Once glucose normalized, recommend restarting diet and recommend Admelog 18 units TID before meals (HOLD if NPO or not eating). Taper downwards preemptively if steroids are being reduced to avoid hypoglycemia.   - Recommend moderate dose admelog correction scale TIDQAC and separate moderate dose scale QHS  - Please check FSG before meals and QHS, or q6h while NPO  - Inpatient glucose goals: <140 pre-meal, <180 random  - consistent carb diet when eating      DISCHARGE PLANNING:  - Discharge recs pending clinical course and depending on steroids. Will need basal/bolus.   - will need Endocrinology follow up routinely with his endocrinologist Dr. Romero.     #Hypothyroidism  Home regimen: Levothyroxine 88 mcg daily  12/31/24 TSH 0.55 FT4 1.2  Continue levothyroxine 88 mcg daily    #Hypertension  - Goal BP <130/80  - on hydralazine, clonidine, coreg  - Management as per primary team  - check urine albumin level as outpatient    #Hyperlipidemia  - LDL goal <70  - on rosuvastatin 10 mg daily  - check lipid panel as outpatient on a yearly basis    Discussed with attending physician.  Derrell Sanchez,    PGY-4 Endocrine Fellow  Can be reached via Microsoft Teams.    For follow up questions, discharge recommendations or new consults, please email [email or web address] or [email or web address] ([email or web address]) or call the answering service at [phone] (weekdays); [phone] (nights/weekends).   For emergencies, please page fellow on call.  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   Endocrinology consulted for hyperglycemia.    #Uncontrolled Type 2 Diabetes Mellitus with  #Steroid-induced hyperglycemia  - Follows with: Dr. Romero  - A1C with Estimated Average Glucose Result: 7.8 % (12-29-24)  - home regimen: Came from rehab, there he was on Lantus 38 units, Admelog 10 units with correction scale  - eGFR: 70 mL/min/1.73m2 (01-26-25)  - glucose grossly elevated, no evidence of DKA on labs      INPATIENT PLAN:  At this time, patient hyperglycemic to the 400s since yesterday morning. Recommend NPO status, moderate-dose correction insulin q4h until glucose is under 250, IV fluids as tolerated.   In the past 24 hours, patient received 98 units of insulin and still grossly hyperglycemic. However, of note, patient was hypoglycemic on 1/24 in the morning after receiving 38 units of Lantus the night before. Therefore, will proceed with caution.  - Recommend Lantus 36 units QHS   - Once glucose normalized, recommend restarting diet and recommend Admelog 18 units TID before meals (HOLD if NPO or not eating). Taper downwards preemptively if steroids are being reduced to avoid hypoglycemia.   - Recommend moderate dose admelog correction scale TIDQAC and separate moderate dose scale QHS  - Please check FSG before meals and QHS, or q6h while NPO  - Inpatient glucose goals: <140 pre-meal, <180 random  - consistent carb diet when eating      DISCHARGE PLANNING:  - Discharge recs pending clinical course and depending on steroids. Will need basal/bolus.   - will need Endocrinology follow up routinely with his endocrinologist Dr. Romero.     #Hypothyroidism  Home regimen: Levothyroxine 88 mcg daily  12/31/24 TSH 0.55 FT4 1.2  Continue levothyroxine 88 mcg daily    #Hypertension  - Goal BP <130/80  - on hydralazine, clonidine, coreg  - Management as per primary team  - check urine albumin level as outpatient    #Hyperlipidemia  - LDL goal <70  - on rosuvastatin 10 mg daily  - check lipid panel as outpatient on a yearly basis    Discussed with attending physician.  Derrell Sanchez,    PGY-4 Endocrine Fellow  Can be reached via Microsoft Teams.    For follow up questions, discharge recommendations or new consults, please email [email or web address] ([email or web address]) or [email or web address] ([facility]) or call the answering service at [phone] (weekdays); [phone] (nights/weekends).   For emergencies, please page fellow on call.

## 2025-01-26 NOTE — PROVIDER CONTACT NOTE (OTHER) - BACKGROUND
pt. admitted for DLBCL
Pt. admitted for DLBCL
dx DLBCL
pt. admitted for DLBCL
DLBCL
DLBCL
DTI on sacrum
DLBCL

## 2025-01-26 NOTE — PROGRESS NOTE ADULT - NUTRITIONAL ASSESSMENT
This patient has been assessed with a concern for Malnutrition and has been determined to have a diagnosis/diagnoses of Moderate protein-calorie malnutrition.    This patient is being managed with:   Diet Consistent Carbohydrate/No Snacks-  Supplement Feeding Modality:  Oral  Glucerna Shake Cans or Servings Per Day:  1       Frequency:  Daily  Entered: Jan 25 2025  1:11PM   This patient has been assessed with a concern for Malnutrition and has been determined to have a diagnosis/diagnoses of Moderate protein-calorie malnutrition.    This patient is being managed with:   Diet Consistent Carbohydrate/No Snacks-  Supplement Feeding Modality:  Oral  Glucerna Shake Cans or Servings Per Day:  1       Frequency:  Daily  Entered: Jan 25 2025  1:11PM

## 2025-01-27 LAB
ALBUMIN SERPL ELPH-MCNC: 2.4 G/DL — LOW (ref 3.3–5)
ALP SERPL-CCNC: 89 U/L — SIGNIFICANT CHANGE UP (ref 40–120)
ALT FLD-CCNC: 36 U/L — SIGNIFICANT CHANGE UP (ref 10–45)
ANION GAP SERPL CALC-SCNC: 11 MMOL/L — SIGNIFICANT CHANGE UP (ref 5–17)
AST SERPL-CCNC: 37 U/L — SIGNIFICANT CHANGE UP (ref 10–40)
BASOPHILS # BLD AUTO: 0.01 K/UL — SIGNIFICANT CHANGE UP (ref 0–0.2)
BASOPHILS NFR BLD AUTO: 0.1 % — SIGNIFICANT CHANGE UP (ref 0–2)
BILIRUB SERPL-MCNC: 0.2 MG/DL — SIGNIFICANT CHANGE UP (ref 0.2–1.2)
BLD GP AB SCN SERPL QL: NEGATIVE — SIGNIFICANT CHANGE UP
BUN SERPL-MCNC: 35 MG/DL — HIGH (ref 7–23)
CALCIUM SERPL-MCNC: 9.5 MG/DL — SIGNIFICANT CHANGE UP (ref 8.4–10.5)
CHLORIDE SERPL-SCNC: 94 MMOL/L — LOW (ref 96–108)
CMV DNA CSF QL NAA+PROBE: SIGNIFICANT CHANGE UP IU/ML
CMV DNA SPEC NAA+PROBE-LOG#: SIGNIFICANT CHANGE UP LOG10IU/ML
CO2 SERPL-SCNC: 22 MMOL/L — SIGNIFICANT CHANGE UP (ref 22–31)
CREAT SERPL-MCNC: 0.87 MG/DL — SIGNIFICANT CHANGE UP (ref 0.5–1.3)
CULTURE RESULTS: SIGNIFICANT CHANGE UP
CULTURE RESULTS: SIGNIFICANT CHANGE UP
EGFR: 95 ML/MIN/1.73M2 — SIGNIFICANT CHANGE UP
EOSINOPHIL # BLD AUTO: 0 K/UL — SIGNIFICANT CHANGE UP (ref 0–0.5)
EOSINOPHIL NFR BLD AUTO: 0 % — SIGNIFICANT CHANGE UP (ref 0–6)
GLUCOSE BLDC GLUCOMTR-MCNC: 319 MG/DL — HIGH (ref 70–99)
GLUCOSE BLDC GLUCOMTR-MCNC: 363 MG/DL — HIGH (ref 70–99)
GLUCOSE BLDC GLUCOMTR-MCNC: 382 MG/DL — HIGH (ref 70–99)
GLUCOSE BLDC GLUCOMTR-MCNC: 389 MG/DL — HIGH (ref 70–99)
GLUCOSE BLDC GLUCOMTR-MCNC: 408 MG/DL — HIGH (ref 70–99)
GLUCOSE SERPL-MCNC: 301 MG/DL — HIGH (ref 70–99)
HCT VFR BLD CALC: 24 % — LOW (ref 39–50)
HGB BLD-MCNC: 7.8 G/DL — LOW (ref 13–17)
IMM GRANULOCYTES NFR BLD AUTO: 1.8 % — HIGH (ref 0–0.9)
LDH SERPL L TO P-CCNC: 519 U/L — HIGH (ref 50–242)
LYMPHOCYTES # BLD AUTO: 0.11 K/UL — LOW (ref 1–3.3)
LYMPHOCYTES # BLD AUTO: 1.3 % — LOW (ref 13–44)
MAGNESIUM SERPL-MCNC: 1.8 MG/DL — SIGNIFICANT CHANGE UP (ref 1.6–2.6)
MCHC RBC-ENTMCNC: 28.8 PG — SIGNIFICANT CHANGE UP (ref 27–34)
MCHC RBC-ENTMCNC: 32.5 G/DL — SIGNIFICANT CHANGE UP (ref 32–36)
MCV RBC AUTO: 88.6 FL — SIGNIFICANT CHANGE UP (ref 80–100)
MONOCYTES # BLD AUTO: 0.24 K/UL — SIGNIFICANT CHANGE UP (ref 0–0.9)
MONOCYTES NFR BLD AUTO: 2.9 % — SIGNIFICANT CHANGE UP (ref 2–14)
NEUTROPHILS # BLD AUTO: 7.66 K/UL — HIGH (ref 1.8–7.4)
NEUTROPHILS NFR BLD AUTO: 93.9 % — HIGH (ref 43–77)
NRBC # BLD: 0 /100 WBCS — SIGNIFICANT CHANGE UP (ref 0–0)
NRBC BLD-RTO: 0 /100 WBCS — SIGNIFICANT CHANGE UP (ref 0–0)
NT-PROBNP SERPL-SCNC: 689 PG/ML — HIGH (ref 0–300)
PHOSPHATE SERPL-MCNC: 2.8 MG/DL — SIGNIFICANT CHANGE UP (ref 2.5–4.5)
PLATELET # BLD AUTO: 478 K/UL — HIGH (ref 150–400)
POTASSIUM SERPL-MCNC: 5.3 MMOL/L — SIGNIFICANT CHANGE UP (ref 3.5–5.3)
POTASSIUM SERPL-SCNC: 5.3 MMOL/L — SIGNIFICANT CHANGE UP (ref 3.5–5.3)
PROT SERPL-MCNC: 5.3 G/DL — LOW (ref 6–8.3)
RBC # BLD: 2.71 M/UL — LOW (ref 4.2–5.8)
RBC # FLD: 18.9 % — HIGH (ref 10.3–14.5)
RH IG SCN BLD-IMP: POSITIVE — SIGNIFICANT CHANGE UP
SODIUM SERPL-SCNC: 127 MMOL/L — LOW (ref 135–145)
SPECIMEN SOURCE: SIGNIFICANT CHANGE UP
SPECIMEN SOURCE: SIGNIFICANT CHANGE UP
URATE SERPL-MCNC: 5.5 MG/DL — SIGNIFICANT CHANGE UP (ref 3.4–8.8)
WBC # BLD: 8.17 K/UL — SIGNIFICANT CHANGE UP (ref 3.8–10.5)
WBC # FLD AUTO: 8.17 K/UL — SIGNIFICANT CHANGE UP (ref 3.8–10.5)

## 2025-01-27 PROCEDURE — 99232 SBSQ HOSP IP/OBS MODERATE 35: CPT | Mod: GC

## 2025-01-27 PROCEDURE — 99233 SBSQ HOSP IP/OBS HIGH 50: CPT | Mod: GC

## 2025-01-27 PROCEDURE — 99232 SBSQ HOSP IP/OBS MODERATE 35: CPT

## 2025-01-27 PROCEDURE — 99233 SBSQ HOSP IP/OBS HIGH 50: CPT | Mod: FS

## 2025-01-27 PROCEDURE — 71045 X-RAY EXAM CHEST 1 VIEW: CPT | Mod: 26

## 2025-01-27 RX ORDER — INSULIN LISPRO 100/ML
26 VIAL (ML) SUBCUTANEOUS
Refills: 0 | Status: DISCONTINUED | OUTPATIENT
Start: 2025-01-27 | End: 2025-01-28

## 2025-01-27 RX ORDER — INSULIN GLARGINE-YFGN 100 [IU]/ML
42 INJECTION, SOLUTION SUBCUTANEOUS AT BEDTIME
Refills: 0 | Status: DISCONTINUED | OUTPATIENT
Start: 2025-01-27 | End: 2025-01-28

## 2025-01-27 RX ADMIN — Medication 18 UNIT(S): at 09:53

## 2025-01-27 RX ADMIN — PREDNISONE 40 MILLIGRAM(S): 5 TABLET ORAL at 05:03

## 2025-01-27 RX ADMIN — Medication 26 UNIT(S): at 17:44

## 2025-01-27 RX ADMIN — PREDNISONE 40 MILLIGRAM(S): 5 TABLET ORAL at 17:45

## 2025-01-27 RX ADMIN — POLYETHYLENE GLYCOL 3350 17 GRAM(S): 17 POWDER, FOR SOLUTION ORAL at 21:19

## 2025-01-27 RX ADMIN — Medication 15 GRAM(S): at 21:18

## 2025-01-27 RX ADMIN — BUPROPION HYDROCHLORIDE 300 MILLIGRAM(S): 150 TABLET, EXTENDED RELEASE ORAL at 11:45

## 2025-01-27 RX ADMIN — ESCITALOPRAM 10 MILLIGRAM(S): 10 TABLET, FILM COATED ORAL at 11:45

## 2025-01-27 RX ADMIN — SULFAMETHOXAZOLE AND TRIMETHOPRIM 280 MILLIGRAM(S): 400; 80 TABLET ORAL at 13:33

## 2025-01-27 RX ADMIN — PANTOPRAZOLE 40 MILLIGRAM(S): 20 TABLET, DELAYED RELEASE ORAL at 11:45

## 2025-01-27 RX ADMIN — Medication 2 TABLET(S): at 21:18

## 2025-01-27 RX ADMIN — Medication 100 MILLIGRAM(S): at 13:25

## 2025-01-27 RX ADMIN — Medication 100 MILLIGRAM(S): at 05:02

## 2025-01-27 RX ADMIN — Medication 10 MILLIGRAM(S): at 05:03

## 2025-01-27 RX ADMIN — Medication 10 GRAM(S): at 05:03

## 2025-01-27 RX ADMIN — Medication 1 DROP(S): at 05:03

## 2025-01-27 RX ADMIN — VALACYCLOVIR 500 MILLIGRAM(S): 1000 TABLET ORAL at 17:44

## 2025-01-27 RX ADMIN — Medication 10: at 17:43

## 2025-01-27 RX ADMIN — Medication 1 DROP(S): at 11:44

## 2025-01-27 RX ADMIN — Medication 1 DROP(S): at 17:45

## 2025-01-27 RX ADMIN — Medication 12.5 MILLIGRAM(S): at 17:45

## 2025-01-27 RX ADMIN — CLONIDINE HYDROCHLORIDE 0.1 MILLIGRAM(S): 0.2 TABLET ORAL at 05:02

## 2025-01-27 RX ADMIN — SULFAMETHOXAZOLE AND TRIMETHOPRIM 280 MILLIGRAM(S): 400; 80 TABLET ORAL at 21:19

## 2025-01-27 RX ADMIN — SULFAMETHOXAZOLE AND TRIMETHOPRIM 280 MILLIGRAM(S): 400; 80 TABLET ORAL at 05:01

## 2025-01-27 RX ADMIN — INSULIN GLARGINE-YFGN 42 UNIT(S): 100 INJECTION, SOLUTION SUBCUTANEOUS at 21:52

## 2025-01-27 RX ADMIN — Medication 18 UNIT(S): at 13:11

## 2025-01-27 RX ADMIN — ROSUVASTATIN CALCIUM 10 MILLIGRAM(S): 10 TABLET, FILM COATED ORAL at 21:18

## 2025-01-27 RX ADMIN — LEVOTHYROXINE SODIUM 88 MICROGRAM(S): 25 TABLET ORAL at 05:03

## 2025-01-27 RX ADMIN — Medication 100 MILLIGRAM(S): at 21:18

## 2025-01-27 RX ADMIN — Medication 4: at 21:51

## 2025-01-27 RX ADMIN — VALACYCLOVIR 500 MILLIGRAM(S): 1000 TABLET ORAL at 05:02

## 2025-01-27 RX ADMIN — ANTISEPTIC SURGICAL SCRUB 1 APPLICATION(S): 0.04 SOLUTION TOPICAL at 09:56

## 2025-01-27 RX ADMIN — Medication 10: at 13:13

## 2025-01-27 RX ADMIN — CLONIDINE HYDROCHLORIDE 0.1 MILLIGRAM(S): 0.2 TABLET ORAL at 13:25

## 2025-01-27 RX ADMIN — ENOXAPARIN SODIUM 40 MILLIGRAM(S): 100 INJECTION SUBCUTANEOUS at 21:19

## 2025-01-27 RX ADMIN — Medication 10: at 09:52

## 2025-01-27 RX ADMIN — Medication 10 GRAM(S): at 11:45

## 2025-01-27 RX ADMIN — CLONIDINE HYDROCHLORIDE 0.1 MILLIGRAM(S): 0.2 TABLET ORAL at 21:18

## 2025-01-27 RX ADMIN — Medication 12.5 MILLIGRAM(S): at 05:02

## 2025-01-27 NOTE — PROGRESS NOTE ADULT - PROBLEM SELECTOR PLAN 5
D/C Tacrolimus and Lisinopril due to DIMITRIOS and PCP.   AT RISK FOR ADRENAL INSUFFIENCEY IF HYPOTENSIVE 50mg hydrocortisone IV q8  s/p Hydrocortisone 25 mg IV, now off--on pred taper for PJP PNA AT RISK FOR ADRENAL INSUFFIENCEY IF HYPOTENSIVE 50mg hydrocortisone IV q8  s/p Hydrocortisone 25 mg IV, now off--on pred taper for PJP PNA  1/26 D/C Tacrolimus and Lisinopril due to DIMITRIOS and PCP.   Transplant nephro following- recomm Transplant ID to consult

## 2025-01-27 NOTE — PROGRESS NOTE ADULT - SUBJECTIVE AND OBJECTIVE BOX
[facility]  WANG Mccoy Y. Patel, S. Shah, G. Casimir  [phone]  ([phone] - weekdays after 5pm and weekends)    Name: JAN CALVIN  Age/Gender: 69y Male  MRN: [id number]    Interval History:  Patient seen and examined this morning.   Remains on HFNC, denies fever, cough, dyspnea.   No new complaints noted.  Notes reviewed  No concerning overnight events  Afebrile   Allergies: No Known Allergies      Objective:  Vitals:   T(F): 98.1 (01-27-25 @ 05:23), Max: 98.7 (01-26-25 @ 09:22)  HR: 62 (01-27-25 @ 05:23) (62 - 74)  BP: 138/81 (01-27-25 @ 05:23) (121/52 - 156/72)  RR: 18 (01-27-25 @ 05:23) (18 - 18)  SpO2: 97% (01-27-25 @ 05:23) (94% - 100%)  Physical Examination:  General: no acute distress, HFNC  HEENT: NC/AT, anicteric, EOMI  Respiratory: decreased breath sounds b/l   Cardiovascular: S1 and S2 present, normal rate   Gastrointestinal: soft, nontender, nondistended  Extremities: no edema, no cyanosis  Skin: no visible rash    Laboratory Studies:  CBC:                       7.8    8.17  )-----------( 478      ( 27 Jan 2025 06:58 )             24.0     WBC Trend:  8.17 01-27-25 @ 06:58  11.69 01-26-25 @ 06:53  8.38 01-25-25 @ 06:37  10.35 01-24-25 @ 07:30  7.93 01-23-25 @ 06:57  7.67 01-22-25 @ 06:52  10.76 01-21-25 @ 18:48    CMP: 01-27    127[L]  |  94[L]  |  35[H]  ----------------------------<  301[H]  5.3   |  22  |  0.87    Ca    9.5      27 Jan 2025 07:00  Phos  2.8     01-27  Mg     1.8     01-27    TPro  5.3[L]  /  Alb  2.4[L]  /  TBili  0.2  /  DBili  x   /  AST  37  /  ALT  36  /  AlkPhos  89  01-27    Creatinine: 0.87 mg/dL (01-27-25 @ 07:00)  Creatinine: 1.01 mg/dL (01-26-25 @ 17:42)  Creatinine: 1.14 mg/dL (01-26-25 @ 08:11)  Creatinine: 1.17 mg/dL (01-26-25 @ 06:54)  Creatinine: 1.05 mg/dL (01-25-25 @ 06:37)  Creatinine: 0.93 mg/dL (01-24-25 @ 07:23)  Creatinine: 0.84 mg/dL (01-23-25 @ 07:00)  Creatinine: 0.82 mg/dL (01-22-25 @ 06:53)  Creatinine: 1.04 mg/dL (01-21-25 @ 18:48)    LIVER FUNCTIONS - ( 27 Jan 2025 07:00 )  Alb: 2.4 g/dL / Pro: 5.3 g/dL / ALK PHOS: 89 U/L / ALT: 36 U/L / AST: 37 U/L / GGT: x           Microbiology: reviewed   Culture - Blood (collected 01-24-25 @ 00:46)  Source: .Blood BLOOD  Preliminary Report (01-27-25 @ 04:01):    No growth at 72 Hours    Culture - Blood (collected 01-24-25 @ 00:18)  Source: .Blood BLOOD  Preliminary Report (01-27-25 @ 04:01):    No growth at 72 Hours    Culture - Urine (collected 01-22-25 @ 05:05)  Source: Clean Catch Clean Catch (Midstream)  Final Report (01-24-25 @ 13:31):    10,000 - 49,000 CFU/mL Candida albicans    "Susceptibilities not performed"    Culture - Blood (collected 01-21-25 @ 22:25)  Source: .Blood BLOOD  Final Report (01-27-25 @ 03:00):    No growth at 5 days    Culture - Blood (collected 01-21-25 @ 22:07)  Source: .Blood BLOOD  Final Report (01-27-25 @ 03:00):    No growth at 5 days    Culture - Blood (collected 01-19-25 @ 18:36)  Source: .Blood BLOOD  Final Report (01-24-25 @ 23:00):    No growth at 5 days    Culture - Urine (collected 01-19-25 @ 18:36)  Source: Clean Catch Clean Catch (Midstream)  Final Report (01-20-25 @ 21:26):    10,000 - 49,000 CFU/mL Candida albicans    "Susceptibilities not performed"    Culture - Urine (collected 01-17-25 @ 17:09)  Source: Clean Catch Clean Catch (Midstream)  Final Report (01-18-25 @ 20:03):    >=3 organisms. Probable collection contamination.    Culture - Blood (collected 01-17-25 @ 11:38)  Source: .Blood BLOOD  Final Report (01-22-25 @ 15:00):    No growth at 5 days    Radiology: reviewed     Medications:  acetaminophen     Tablet .. 650 milliGRAM(s) Oral every 6 hours PRN  amLODIPine   Tablet 10 milliGRAM(s) Oral daily  artificial tears (preservative free) Ophthalmic Solution 1 Drop(s) Both EYES every 6 hours  buPROPion XL (24-Hour) . 300 milliGRAM(s) Oral daily  carvedilol 12.5 milliGRAM(s) Oral every 12 hours  chlorhexidine 4% Liquid 1 Application(s) Topical <User Schedule>  cloNIDine 0.1 milliGRAM(s) Oral three times a day  dextrose 5%. 1000 milliLiter(s) IV Continuous <Continuous>  dextrose 5%. 1000 milliLiter(s) IV Continuous <Continuous>  dextrose 50% Injectable 25 Gram(s) IV Push once  dextrose Oral Gel 15 Gram(s) Oral once PRN  enoxaparin Injectable 40 milliGRAM(s) SubCutaneous <User Schedule>  escitalopram 10 milliGRAM(s) Oral daily  glucagon  Injectable 1 milliGRAM(s) IntraMuscular once  hydrALAZINE 100 milliGRAM(s) Oral every 8 hours  insulin glargine Injectable (LANTUS) 36 Unit(s) SubCutaneous at bedtime  insulin lispro (ADMELOG) corrective regimen sliding scale   SubCutaneous three times a day before meals  insulin lispro (ADMELOG) corrective regimen sliding scale   SubCutaneous at bedtime  insulin lispro Injectable (ADMELOG) 18 Unit(s) SubCutaneous three times a day before meals  lactulose Syrup 10 Gram(s) Oral every 6 hours  lactulose Syrup 15 Gram(s) Oral <User Schedule>  levothyroxine 88 MICROGram(s) Oral daily  pantoprazole   Suspension 40 milliGRAM(s) Oral daily  polyethylene glycol 3350 17 Gram(s) Oral at bedtime  polyethylene glycol 3350 17 Gram(s) Oral daily PRN  predniSONE   Tablet 40 milliGRAM(s) Oral two times a day  rosuvastatin 10 milliGRAM(s) Oral at bedtime  senna 2 Tablet(s) Oral at bedtime  sodium chloride 0.9% lock flush 10 milliLiter(s) IV Push every 1 hour PRN  sodium chloride 0.9%. 1000 milliLiter(s) IV Continuous <Continuous>  trimethoprim / sulfamethoxazole IVPB 320 milliGRAM(s) IV Intermittent every 8 hours  valACYclovir 500 milliGRAM(s) Oral every 12 hours    Current Antimicrobials:  trimethoprim / sulfamethoxazole IVPB 320 milliGRAM(s) IV Intermittent every 8 hours  valACYclovir 500 milliGRAM(s) Oral every 12 hours    Prior/Completed Antimicrobials:  ertapenem  IVPB  piperacillin/tazobactam IVPB.  piperacillin/tazobactam IVPB.-  vancomycin  IVPB

## 2025-01-27 NOTE — PROGRESS NOTE ADULT - SUBJECTIVE AND OBJECTIVE BOX
Chief Complaint: Diabetes Mellitus follow up    INTERVAL HX:  Patient seen at bedside. He is on HFNC at bedside. His chemotherapy is on hold for now.  Reports eating well, finishes most food on each tray.   BG over the last 24 hrs have been within goal range 100-180mg/dl. No hypoglycemia.     Review of Systems:  General: As above  GI: No nausea, vomiting  Endocrine: no  S&Sx of hypoglycemia    Allergies    No Known Allergies    Intolerances      MEDICATIONS  (STANDING):  amLODIPine   Tablet 10 milliGRAM(s) Oral daily  artificial tears (preservative free) Ophthalmic Solution 1 Drop(s) Both EYES every 6 hours  buPROPion XL (24-Hour) . 300 milliGRAM(s) Oral daily  carvedilol 12.5 milliGRAM(s) Oral every 12 hours  chlorhexidine 4% Liquid 1 Application(s) Topical <User Schedule>  cloNIDine 0.1 milliGRAM(s) Oral three times a day  dextrose 5%. 1000 milliLiter(s) (100 mL/Hr) IV Continuous <Continuous>  dextrose 5%. 1000 milliLiter(s) (50 mL/Hr) IV Continuous <Continuous>  dextrose 50% Injectable 25 Gram(s) IV Push once  enoxaparin Injectable 40 milliGRAM(s) SubCutaneous <User Schedule>  escitalopram 10 milliGRAM(s) Oral daily  glucagon  Injectable 1 milliGRAM(s) IntraMuscular once  hydrALAZINE 100 milliGRAM(s) Oral every 8 hours  insulin glargine Injectable (LANTUS) 36 Unit(s) SubCutaneous at bedtime  insulin lispro (ADMELOG) corrective regimen sliding scale   SubCutaneous three times a day before meals  insulin lispro (ADMELOG) corrective regimen sliding scale   SubCutaneous at bedtime  insulin lispro Injectable (ADMELOG) 18 Unit(s) SubCutaneous three times a day before meals  lactulose Syrup 15 Gram(s) Oral <User Schedule>  lactulose Syrup 10 Gram(s) Oral every 6 hours  levothyroxine 88 MICROGram(s) Oral daily  pantoprazole   Suspension 40 milliGRAM(s) Oral daily  polyethylene glycol 3350 17 Gram(s) Oral at bedtime  predniSONE   Tablet 40 milliGRAM(s) Oral two times a day  rosuvastatin 10 milliGRAM(s) Oral at bedtime  senna 2 Tablet(s) Oral at bedtime  sodium chloride 0.9%. 1000 milliLiter(s) (50 mL/Hr) IV Continuous <Continuous>  trimethoprim / sulfamethoxazole IVPB 320 milliGRAM(s) IV Intermittent every 8 hours  valACYclovir 500 milliGRAM(s) Oral every 12 hours        insulin glargine Injectable (LANTUS)   36 Unit(s) SubCutaneous (01-26-25 @ 23:24)    insulin lispro (ADMELOG) corrective regimen sliding scale   10  SubCutaneous (01-27-25 @ 13:13)   10 Unit(s) SubCutaneous (01-27-25 @ 09:52)    insulin lispro (ADMELOG) corrective regimen sliding scale   10 Unit(s) SubCutaneous (01-26-25 @ 17:39)    insulin lispro Injectable (ADMELOG)   18 Unit(s) SubCutaneous (01-27-25 @ 13:11)   18 Unit(s) SubCutaneous (01-27-25 @ 09:53)    levothyroxine   88 MICROGram(s) Oral (01-27-25 @ 05:03)    predniSONE   Tablet   40 milliGRAM(s) Oral (01-27-25 @ 05:03)   40 milliGRAM(s) Oral (01-26-25 @ 18:13)    rosuvastatin   10 milliGRAM(s) Oral (01-26-25 @ 22:36)        PHYSICAL EXAM:  VITALS: T(C): 36.7 (01-27-25 @ 13:20)  T(F): 98 (01-27-25 @ 13:20), Max: 98.4 (01-26-25 @ 21:00)  HR: 68 (01-27-25 @ 15:09) (62 - 81)  BP: 166/73 (01-27-25 @ 13:20) (121/52 - 166/73)  RR:  (18 - 20)  SpO2:  (95% - 98%)  Wt(kg): --  GENERAL: NAD  Respiratory: Respirations unlabored   Extremities: Warm, no edema  NEURO: Alert , appropriate     LABS:  POCT Blood Glucose.: 389 mg/dL (01-27-25 @ 13:03)  POCT Blood Glucose.: 408 mg/dL (01-27-25 @ 13:00)  POCT Blood Glucose.: 363 mg/dL (01-27-25 @ 09:41)  POCT Blood Glucose.: 238 mg/dL (01-26-25 @ 22:40)  POCT Blood Glucose.: 361 mg/dL (01-26-25 @ 17:02)  POCT Blood Glucose.: 381 mg/dL (01-26-25 @ 13:33)  POCT Blood Glucose.: 453 mg/dL (01-26-25 @ 08:38)  POCT Blood Glucose.: 440 mg/dL (01-26-25 @ 08:36)  POCT Blood Glucose.: 394 mg/dL (01-26-25 @ 01:50)  POCT Blood Glucose.: 418 mg/dL (01-25-25 @ 21:04)  POCT Blood Glucose.: 456 mg/dL (01-25-25 @ 17:06)  POCT Blood Glucose.: 479 mg/dL (01-25-25 @ 16:59)  POCT Blood Glucose.: 457 mg/dL (01-25-25 @ 12:11)  POCT Blood Glucose.: 472 mg/dL (01-25-25 @ 12:09)  POCT Blood Glucose.: 472 mg/dL (01-25-25 @ 08:44)  POCT Blood Glucose.: 456 mg/dL (01-25-25 @ 08:40)  POCT Blood Glucose.: 311 mg/dL (01-24-25 @ 21:12)  POCT Blood Glucose.: 185 mg/dL (01-24-25 @ 17:08)                          7.8    8.17  )-----------( 478      ( 27 Jan 2025 06:58 )             24.0     01-27    127[L]  |  94[L]  |  35[H]  ----------------------------<  301[H]  5.3   |  22  |  0.87    Ca    9.5      27 Jan 2025 07:00  Phos  2.8     01-27  Mg     1.8     01-27    TPro  5.3[L]  /  Alb  2.4[L]  /  TBili  0.2  /  DBili  x   /  AST  37  /  ALT  36  /  AlkPhos  89  01-27    eGFR: 95 mL/min/1.73m2 (27 Jan 2025 07:00)  eGFR: 82 mL/min/1.73m2 (26 Jan 2025 17:42)      Thyroid Function Tests:  12-31 @ 07:04 TSH 0.55 FreeT4 1.2 T3 -- Anti TPO -- Anti Thyroglobulin Ab -- TSI --  12-30 @ 09:53 TSH -- FreeT4 1.3 T3 -- Anti TPO -- Anti Thyroglobulin Ab -- TSI --      A1C with Estimated Average Glucose Result: 7.8 % (12-29-24 @ 07:07)  A1C with Estimated Average Glucose Result: 7.7 % (12-28-24 @ 10:06)  A1C with Estimated Average Glucose Result: 7.4 % (11-30-24 @ 06:40)    Estimated Average Glucose: 177 mg/dL (12-29-24 @ 07:07)  Estimated Average Glucose: 174 mg/dL (12-28-24 @ 10:06)  Estimated Average Glucose: 166 mg/dL (11-30-24 @ 06:40)        Diet, Consistent Carbohydrate/No Snacks:   Supplement Feeding Modality:  Oral  Glucerna Shake Cans or Servings Per Day:  1       Frequency:  Daily (01-26-25 @ 23:00) [Active]

## 2025-01-27 NOTE — PROGRESS NOTE ADULT - NS ATTEND AMEND GEN_ALL_CORE FT
.  Primary: [place]    Vital Signs Last 24 Hrs  T(C): 37.6 (26 Jan 2025 00:19), Max: 37.6 (26 Jan 2025 00:19)  T(F): 99.6 (26 Jan 2025 00:19), Max: 99.6 (26 Jan 2025 00:19)  HR: 78 (26 Jan 2025 00:19) (64 - 89)  BP: 126/56 (26 Jan 2025 00:19) (93/53 - 126/56)  BP(mean): --  RR: 18 (26 Jan 2025 00:19) (16 - 20)  SpO2: 94% (26 Jan 2025 00:19) (93% - 99%)    Parameters below as of 26 Jan 2025 00:19  Patient On (Oxygen Delivery Method): HFNC  O2 Flow (L/min): 60  O2 Concentration (%): 80    MEDICATIONS  (STANDING):  amLODIPine   Tablet 10 milliGRAM(s) Oral daily  buPROPion XL (24-Hour) . 300 milliGRAM(s) Oral daily  carvedilol 12.5 milliGRAM(s) Oral every 12 hours  chlorhexidine 4% Liquid 1 Application(s) Topical <User Schedule>  cloNIDine 0.1 milliGRAM(s) Oral three times a day  dextrose 5%. 1000 milliLiter(s) (50 mL/Hr) IV Continuous <Continuous>  dextrose 5%. 1000 milliLiter(s) (100 mL/Hr) IV Continuous <Continuous>  dextrose 50% Injectable 25 Gram(s) IV Push once  dextrose 50% Injectable 12.5 Gram(s) IV Push once  dextrose 50% Injectable 25 Gram(s) IV Push once  enoxaparin Injectable 40 milliGRAM(s) SubCutaneous <User Schedule>  escitalopram 10 milliGRAM(s) Oral daily  glucagon  Injectable 1 milliGRAM(s) IntraMuscular once  glucagon  Injectable 1 milliGRAM(s) IntraMuscular once  hydrALAZINE 100 milliGRAM(s) Oral every 8 hours  insulin glargine Injectable (LANTUS) 24 Unit(s) SubCutaneous at bedtime  insulin lispro (ADMELOG) corrective regimen sliding scale   SubCutaneous three times a day before meals  insulin lispro (ADMELOG) corrective regimen sliding scale   SubCutaneous at bedtime  insulin lispro Injectable (ADMELOG) 10 Unit(s) SubCutaneous before breakfast  insulin lispro Injectable (ADMELOG) 10 Unit(s) SubCutaneous before lunch  insulin lispro Injectable (ADMELOG) 10 Unit(s) SubCutaneous before dinner  lactulose Syrup 15 Gram(s) Oral <User Schedule>  lactulose Syrup 10 Gram(s) Oral every 6 hours  levothyroxine 88 MICROGram(s) Oral daily  lisinopril 10 milliGRAM(s) Oral daily  meropenem  IVPB 1000 milliGRAM(s) IV Intermittent every 8 hours  mupirocin 2% Nasal 1 Application(s) Both Nostrils two times a day  pantoprazole   Suspension 40 milliGRAM(s) Oral daily  polyethylene glycol 3350 17 Gram(s) Oral at bedtime  predniSONE   Tablet 40 milliGRAM(s) Oral two times a day  rosuvastatin 10 milliGRAM(s) Oral at bedtime  senna 2 Tablet(s) Oral at bedtime  tacrolimus 1 milliGRAM(s) Oral <User Schedule>  tacrolimus 1 milliGRAM(s) Oral at bedtime  trimethoprim / sulfamethoxazole IVPB 320 milliGRAM(s) IV Intermittent every 8 hours  valACYclovir 500 milliGRAM(s) Oral every 12 hours    Wife: Ludmila, daughter: Luz Marina and son: Jose J    Assessment: Piero was a [facility] for day 1 cycle 2 mini RCH(O)P for PTLD but complicated by PCP (b-D glucan: +) with high O2 demands    Cystatin C eGFR: 40; standard eGFR calculation: 96    PMHx::  1) renal transplant 2012: left nephrectomy; renal transplant was secondary to DM, 2) AODM, 3) HLD, 4) hypothyroidism, 5) peripheral neuropathy  6) retroperitoneal fibrosis, 7) GERD, 8) HTN, 9) anxiety/depression, 10) obstructive sleep apnea, 11) osteomyelitis and E coli urosepsis (12/1/24 - 12/18/24)    Plan:  Heme:  Hold planned chemotherapy. given toxic presentation and sustained high fevers unlikely to continue anthracycline-based treatment.   Approved for Tafa/REV but given PCP Dx hold this therapy too.       ID:   prednisone 40mg bid (1/24/25 - )  bactrim 320mg q8 (1/24/25 - ): follow renal function with high dose bactrim  Valtrex    ID History  zosyn (1/17/25 - 1/20/25)  ertapenum (1/20/25 - 1/24/25)  mick (1/24/25 - 1/26/25)    Radiographs: Last chest/abd pelvis: 1/17/25, pelvic radiographs: pending; may also require MRI for avascular necrosis   Chest (1/23/25): concerning for PCP    Renal: continue ; please consult renal; as he is receiving high-dose steroids Cystatin C will be unreliable to e GFR.  Concerned that bactrim may be inducing renal dysfunction.     Endo: steroid induced hyperglycemia -- adjust insulin    PM&R consulted: for inability to weight bare before respiratory distress.    DVT prophylaxis: LWHx   FULL CODE  Over 60 minutes were spent in direct patient care and care coordination.    Addendum:  5:51 PM  Confirmed with his wife, Ludmila, that Piero remains full code at this time.  Piero has become encephalopathic with his metabolic disturbances. This AM he was of clearer mind, while eating breakfast, and did not discuss wishing to make any changes in his code status.  Previous conversations (during the past weeks) were always full code. Primary: Jose    Assessment: Piero was a scheduled admission from [facility] (1/17/24) for day 1 cycle 2 mini RCH(O)P for PTLD but complicated by PCP (b-D glucan: +) with high O2 demands. Chemo currently on HOLD.    PMHx::  1) renal transplant 2012: left nephrectomy; renal transplant was secondary to DM, 2) AODM, 3) HLD, 4) hypothyroidism, 5) peripheral neuropathy  6) retroperitoneal fibrosis, 7) GERD, 8) HTN, 9) anxiety/depression, 10) obstructive sleep apnea, 11) osteomyelitis and E coli urosepsis (12/1/24 - 12/18/24)    Plan:  Heme:  Hold planned chemotherapy. given toxic presentation and sustained high fevers unlikely to continue anthracycline-based treatment.   Approved for Tafa/REV but given PCP Dx hold this therapy temporarily      ID:   prednisone 40mg bid [1/24/25 - ]  bactrim 320mg q8 [1/24/25 - ]: follow renal function with high dose bactrim  Valtrex    ID History  zosyn (1/17/25 - 1/20/25)  ertapenum (1/20/25 - 1/24/25)  mick (1/24/25 - 1/26/25)    Radiographs: Last chest/abd pelvis: 1/17/25, CT Chest/A/P (1/23/25): concerning for PCP, Unchanged retroperitoneal soft tissue encasing the aorta and IVC. Diffuse erosive changes of the sacrum with soft tissue infiltration,  similar in appearance to prior, which could represent osteomyelitis.  CXR [1/27/25]: Mild pulmonary vascular congestion appears similar.    Renal: continue ; please consult renal; as he is receiving high-dose steroids Cystatin C will be unreliable to eGFR.  Concerned that bactrim may be inducing renal dysfunction.     Endo: steroid induced hyperglycemia -- adjust insulin    PM&R consulted: for inability to weight bare before respiratory distress.    DVT prophylaxis: LWHx ppx    [facility]  Wife: Ludmila, daughter: Luz Marina and son: Jose J. Confirmed with his wife, Ludmila, that Piero remains full code at this time.  Piero has become encephalopathic with his metabolic disturbances.  Previous conversations (during the past weeks) were always full code.

## 2025-01-27 NOTE — PHARMACOTHERAPY INTERVENTION NOTE - COMMENTS
Clinical Pharmacy Specialist- Hematology/Oncology- Progress Note    Pt is a 70 y/o male with PMH of renal transplant (2012 left nephrectomy), peripheral neuropathy, hypothyroidism, retroperitoneal fibrosis, HTN, HLD, GERD, anxiety/depression, ANGELIKA, and newly diagnosed DLBCL, s/p R mini CHOP x 1 cycle. Was due to be admitted for Cycle 2, but course complicated by possible PCP PNA    Antimicrobial Course (ID Following, MD Risa Ac):  - Zosyn - 1/18- 1/20  --> Ertapenem (per ID, h/o ESBL UTI 12/2024)- 1/20- 1/23  --> Meropenem (less protein bound vs erta)- 1/24  - Bactrim IV + prednisone (CT- susp PCP)- 1/24  - Vancomycin- 1/24 x 1  MRSA nasal swab    Last Neutropenic (ANC<1000): no occurrence  Last Febrile: 1/24@05:52 T= 100.7  Days Non-Neutropenic: >7  Days afebrile: 3    Chemotherapy Course  -Current Regimen: R-mini-RCHOP  History:  (12/28/24)- C1 mini-RCHOP  -Day: 28 (1/24)  BmBx:  Access:     History/Relevant clinical information used in assessment:  - 1/23- adrenal insufficiency- hydrocortisone 25mg q8hr tid x 3 doses given yest 1/22- cont hydrocortisone 25mg IVP tid    Assessment/Plan/Recommendation:  - CT 1/23- possible PCP PNA- worsened from 1/17-  ·	Bactrim IV 320mg q8hr 1/24 (12.5mg/kg- using range of 10-12mg/kg which is acceptable for PCP showing similar efficacy as 15mg/kg; given pt's kidney transplant status, this will mitigate renal risk  ·	Prednisone 40mg PO BID x5d (1/24- 1/28), then 40mg daily x5d (1/29- 2/2), followed by 20mg daily for 11 days (2/3-)     - plan was for tafasitamab + lenalidomide 10mg D1-14 -pending discussion per pt with family- has not completed REMS pt portion for lenalidomide- on HOLD  - for lenalidomide- pt will need AC- lovenox ok; can d/c on aspirin 81mg daily   - poss transfer to medicine team    Tafasitamab dosing schema:  -C1-Tafasitamab 12mg/kg D1,4,8,15,22   -C2&3- Tafasitamab 12mg/kg D1,8,15,22   -C4 onward- Tafasitamab 12mg/kg D1,15   ---------------------------------------------------------  CrCl vs Cystatin-C EGFR (1/17/25)  - CrCl= 95ml/min (scr= 0.81  - Cystatin-C EGFR= 40ml/min (Cystatin-C= 1.63)  - 58% dose difference    Additional Monitoring Needed?   -Yes- Continue to monitor renal function & daily counts for abx escalation/de-escalation   -Discharge Planning:  --> New meds:  --> Meds sent for auth:  --> Delivered meds:    Case discussed with attending/primary team    Gisella Cuba, PharmD, BCPS  Clinical Pharmacy Specialist | Hematology/Oncology  [facility]  Email: [email or web address] or available on [employer] Clinical Pharmacy Specialist- Hematology/Oncology- Progress Note    Pt is a 70 y/o male with PMH of renal transplant (2012 left nephrectomy), peripheral neuropathy, hypothyroidism, retroperitoneal fibrosis, HTN, HLD, GERD, anxiety/depression, ANGELIKA, and newly diagnosed DLBCL, s/p R mini CHOP x 1 cycle. Was due to be admitted for Cycle 2, but course complicated by possible PCP PNA. Next line of treatment discussed is tafasitamab/lenalidomide once stable    Antimicrobial Course (ID Following, MD Risa Ac):  - Zosyn - 1/18- 1/20  --> Ertapenem (per ID, h/o ESBL UTI 12/2024)- 1/20- 1/23  --> Meropenem (less protein bound vs erta)- 1/24  - Bactrim IV + prednisone (CT- susp PCP)- 1/24  - Vancomycin- 1/24 x 1  MRSA nasal swab    Last Neutropenic (ANC<1000): no occurrence  Last Febrile: 1/24@05:52 T= 100.7  Days Non-Neutropenic: >7  Days afebrile: 3    Chemotherapy Course  -Current Regimen: R-mini-RCHOP  History:  (12/28/24)- C1 mini-RCHOP  -Day: 31 (1/27)  BmBx:  Access:     History/Relevant clinical information used in assessment:  - 1/23- adrenal insufficiency- hydrocortisone 25mg q8hr tid x 3 doses given yest 1/22- cont hydrocortisone 25mg IVP tid    Assessment/Plan/Recommendation:  - CT 1/23- possible PCP PNA- worsened from 1/17-  ·	Bactrim IV 320mg q8hr 1/24 (12.5mg/kg- using range of 10-12mg/kg which is acceptable for PCP showing similar efficacy as 15mg/kg; given pt's kidney transplant status, this will mitigate renal risk  ·	Prednisone 40mg PO BID x5d (1/24- 1/28), then 40mg daily x5d (1/29- 2/2), followed by 20mg daily for 11 days (2/3-)   - plan was for tafasitamab + lenalidomide 10mg D1-14 -pending discussion per pt with family- has not completed REMS pt portion for lenalidomide- on HOLD  - for lenalidomide- pt will need AC- lovenox ok; can d/c on aspirin 81mg daily   - poss transfer to medicine team    Tafasitamab dosing schema:  -C1-Tafasitamab 12mg/kg D1,4,8,15,22   -C2&3- Tafasitamab 12mg/kg D1,8,15,22   -C4 onward- Tafasitamab 12mg/kg D1,15   ---------------------------------------------------------  CrCl vs Cystatin-C EGFR (1/17/25)  - CrCl= 95ml/min (scr= 0.81  - Cystatin-C EGFR= 40ml/min (Cystatin-C= 1.63)  - 58% dose difference    Additional Monitoring Needed?   -Yes- Continue to monitor renal function & daily counts for abx escalation/de-escalation   -Discharge Planning:  --> New meds:  --> Meds sent for auth:  --> Delivered meds:    Case discussed with attending/primary team    Gisella Cuba, PharmD, BCPS  Clinical Pharmacy Specialist | Hematology/Oncology  [facility]  Email: [email or web address] or available on [employer]

## 2025-01-27 NOTE — PROGRESS NOTE ADULT - ASSESSMENT
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   Endocrinology consulted for hyperglycemia.  Patient currently on HFNC, chemotherapy on hold due to suspected PNA per notes. Continues on prednisone 40mg twice daily.     #Uncontrolled Type 2 Diabetes Mellitus with  #Steroid-induced hyperglycemia  - Follows with: Dr. Romero  - A1C with Estimated Average Glucose Result: 7.8 % (12-29-24)  - home regimen: Came from rehab, there he was on Lantus 38 units, Admelog 10 units with correction scale  - eGFR: 70 mL/min/1.73m2 (01-26-25)  - glucose grossly elevated, no evidence of DKA on labs      INPATIENT PLAN:  - Increase Lantus to 40 units QHS   - Increase Admelog 18 units TID before meals (HOLD if NPO or not eating). Taper downwards preemptively if steroids are being reduced to avoid hypoglycemia.   - Recommend moderate dose admelog correction scale TIDQAC and separate moderate dose scale QHS  - Please check FSG before meals and QHS, or q6h while NPO  - Inpatient glucose goals: <140 pre-meal, <180 random  - consistent carb diet when eating      DISCHARGE PLANNING:  - Discharge recs pending clinical course and depending on steroids. Will need basal/bolus.   - will need Endocrinology follow up routinely with his endocrinologist Dr. Romero.     #Hypothyroidism  Home regimen: Levothyroxine 88 mcg daily  12/31/24 TSH 0.55 FT4 1.2  Continue levothyroxine 88 mcg daily    #Hypertension  - Goal BP <130/80  - on hydralazine, clonidine, coreg  - Management as per primary team  - check urine albumin level as outpatient    #Hyperlipidemia  - LDL goal <70  - on rosuvastatin 10 mg daily  - check lipid panel as outpatient on a yearly basis      Contact via Microsoft Teams during business hours  To reach covering provider access AL via sunrise tools  For Urgent matters/after-hours/weekends/holidays please page endocrine fellow on call   For nonurgent matters please email [email or web address]    Please note that this patient may be followed by different provider tomorrow.  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   Endocrinology consulted for hyperglycemia.  Patient currently on HFNC, chemotherapy on hold due to suspected PNA per notes. Continues on prednisone 40mg twice daily.     #Uncontrolled Type 2 Diabetes Mellitus with  #Steroid-induced hyperglycemia  - Follows with: Dr. Romero  - A1C with Estimated Average Glucose Result: 7.8 % (12-29-24)  - home regimen: Came from rehab, there he was on Lantus 38 units, Admelog 10 units with correction scale  - eGFR: 70 mL/min/1.73m2 (01-26-25)  - glucose grossly elevated, no evidence of DKA on labs      INPATIENT PLAN:  - Increase Lantus to 42 units QHS   - Increase Admelog 26 units TID before meals (HOLD if NPO or not eating). Taper downwards preemptively if steroids are being reduced to avoid hypoglycemia.   - Recommend moderate dose admelog correction scale TIDQAC and separate moderate dose scale QHS  - Please check FSG before meals and QHS, or q6h while NPO  - Inpatient glucose goals: <140 pre-meal, <180 random  - consistent carb diet when eating      DISCHARGE PLANNING:  - Discharge recs pending clinical course and depending on steroids. Will need basal/bolus.   - will need Endocrinology follow up routinely with his endocrinologist Dr. Romero.     #Hypothyroidism  Home regimen: Levothyroxine 88 mcg daily  12/31/24 TSH 0.55 FT4 1.2  Continue levothyroxine 88 mcg daily    #Hypertension  - Goal BP <130/80  - on hydralazine, clonidine, coreg  - Management as per primary team  - check urine albumin level as outpatient    #Hyperlipidemia  - LDL goal <70  - on rosuvastatin 10 mg daily  - check lipid panel as outpatient on a yearly basis      Contact via Microsoft Teams during business hours  To reach covering provider access AL via sunrise tools  For Urgent matters/after-hours/weekends/holidays please page endocrine fellow on call   For nonurgent matters please email [email or web address]    Please note that this patient may be followed by different provider tomorrow.  Notify endocrine 24 hours prior to discharge for final recommendations

## 2025-01-27 NOTE — PROGRESS NOTE ADULT - PROBLEM SELECTOR PLAN 3
1/26 - Hyperglycemia, changed sliding scale coverage, endocrine consulted.  1/26 - NPO until BG, moderate-dose correction insulin q4h until glucose is under 250, IV fluids as tolerated.   Recommend Lantus 36 units QHS   Once glucose normalized ,recommend restarting diet and recommend Admelog 18 units TID before meals (HOLD if NPO or not eating). Taper downwards preemptively if steroids are being reduced to avoid hypoglycemia.   Recommend moderate dose ADMELOG correction scale TIDQAC and separate moderate dose scale QHS  Please check FSG before meals and QHS, or q6h while NPO  Inpatient glucose goals: <140 pre-meal, <180 random.  consistent carb diet when eating  Monitor FS AC/HS  Sliding scale Humalog AC/HS.  1/24 hypoglycemic to FS 20s off steroids-->adding steroids for PJP PNA, will also lower lantus 38-->24U QHS and continue 8U premeal admelog

## 2025-01-27 NOTE — PROGRESS NOTE ADULT - SUBJECTIVE AND OBJECTIVE BOX
[facility] DIVISION OF KIDNEY DISEASES AND HYPERTENSION -- FOLLOW UP NOTE  --------------------------------------------------------------------------------  Chief Complaint: LRRT    24 hour events/subjective: Pt seen and evaluated this morning. Reports no complaints.     PAST HISTORY  --------------------------------------------------------------------------------  No significant changes to PMH, PSH, FHx, SHx, unless otherwise noted    ALLERGIES & MEDICATIONS  --------------------------------------------------------------------------------  Allergies    No Known Allergies    Intolerances    Standing Inpatient Medications  amLODIPine   Tablet 10 milliGRAM(s) Oral daily  artificial tears (preservative free) Ophthalmic Solution 1 Drop(s) Both EYES every 6 hours  buPROPion XL (24-Hour) . 300 milliGRAM(s) Oral daily  carvedilol 12.5 milliGRAM(s) Oral every 12 hours  chlorhexidine 4% Liquid 1 Application(s) Topical <User Schedule>  cloNIDine 0.1 milliGRAM(s) Oral three times a day  dextrose 5%. 1000 milliLiter(s) IV Continuous <Continuous>  dextrose 5%. 1000 milliLiter(s) IV Continuous <Continuous>  dextrose 50% Injectable 25 Gram(s) IV Push once  enoxaparin Injectable 40 milliGRAM(s) SubCutaneous <User Schedule>  escitalopram 10 milliGRAM(s) Oral daily  glucagon  Injectable 1 milliGRAM(s) IntraMuscular once  hydrALAZINE 100 milliGRAM(s) Oral every 8 hours  insulin glargine Injectable (LANTUS) 36 Unit(s) SubCutaneous at bedtime  insulin lispro (ADMELOG) corrective regimen sliding scale   SubCutaneous three times a day before meals  insulin lispro (ADMELOG) corrective regimen sliding scale   SubCutaneous at bedtime  insulin lispro Injectable (ADMELOG) 18 Unit(s) SubCutaneous three times a day before meals  lactulose Syrup 15 Gram(s) Oral <User Schedule>  lactulose Syrup 10 Gram(s) Oral every 6 hours  levothyroxine 88 MICROGram(s) Oral daily  pantoprazole   Suspension 40 milliGRAM(s) Oral daily  polyethylene glycol 3350 17 Gram(s) Oral at bedtime  predniSONE   Tablet 40 milliGRAM(s) Oral two times a day  rosuvastatin 10 milliGRAM(s) Oral at bedtime  senna 2 Tablet(s) Oral at bedtime  sodium chloride 0.9%. 1000 milliLiter(s) IV Continuous <Continuous>  trimethoprim / sulfamethoxazole IVPB 320 milliGRAM(s) IV Intermittent every 8 hours  valACYclovir 500 milliGRAM(s) Oral every 12 hours    PRN Inpatient Medications  acetaminophen     Tablet .. 650 milliGRAM(s) Oral every 6 hours PRN  dextrose Oral Gel 15 Gram(s) Oral once PRN  polyethylene glycol 3350 17 Gram(s) Oral daily PRN  sodium chloride 0.9% lock flush 10 milliLiter(s) IV Push every 1 hour PRN    REVIEW OF SYSTEMS  --------------------------------------------------------------------------------  see above    VITALS/PHYSICAL EXAM  --------------------------------------------------------------------------------  T(C): 36.7 (01-27-25 @ 13:20), Max: 36.9 (01-26-25 @ 21:00)  HR: 81 (01-27-25 @ 13:20) (62 - 81)  BP: 166/73 (01-27-25 @ 13:20) (121/52 - 166/73)  RR: 20 (01-27-25 @ 13:20) (18 - 20)  SpO2: 95% (01-27-25 @ 13:20) (95% - 98%)  Wt(kg): --    01-26-25 @ 07:01  -  01-27-25 @ 07:00  --------------------------------------------------------  IN: 5804 mL / OUT: 3200 mL / NET: 2604 mL    01-27-25 @ 07:01  -  01-27-25 @ 14:34  --------------------------------------------------------  IN: 780 mL / OUT: 650 mL / NET: 130 mL    Physical Exam:  Gen: NAD  HEENT: MMM  Pulm: Crackles, +HFNC  CV: S1S2  Abd: Soft, +BS   Ext: No LE edema B/L  Neuro: Awake  Skin: Warm and dry  Vascular access: peripheral IVs    LABS/STUDIES  --------------------------------------------------------------------------------              7.8    8.17  >-----------<  478      [01-27-25 @ 06:58]              24.0     127  |  94  |  35  ----------------------------<  301      [01-27-25 @ 07:00]  5.3   |  22  |  0.87        Ca     9.5     [01-27-25 @ 07:00]      Mg     1.8     [01-27-25 @ 07:00]      Phos  2.8     [01-27-25 @ 07:00]    TPro  5.3  /  Alb  2.4  /  TBili  0.2  /  DBili  x   /  AST  37  /  ALT  36  /  AlkPhos  89  [01-27-25 @ 07:00]    Uric acid 5.5      [01-27-25 @ 07:00]        [01-27-25 @ 07:00]    Creatinine Trend:  SCr 0.87 [01-27 @ 07:00]  SCr 1.01 [01-26 @ 17:42]  SCr 1.14 [01-26 @ 08:11]  SCr 1.17 [01-26 @ 06:54]  SCr 1.05 [01-25 @ 06:37]    Iron 16, TIBC 182, %sat 9      [12-05-24 @ 10:00]  Ferritin 973      [12-05-24 @ 10:00]  PTH -- (Ca 11.2)      [12-28-24 @ 10:06]   14  PTH -- (Ca --)      [12-02-24 @ 11:50]   11  Vitamin D (25OH) 39.8      [12-28-24 @ 10:06]  HbA1c 6.9      [02-04-20 @ 11:12]  TSH 0.55      [12-31-24 @ 07:04]    HBsAb Reactive      [12-28-24 @ 18:43]  HBcAb Nonreact      [12-28-24 @ 18:43]

## 2025-01-27 NOTE — PROGRESS NOTE ADULT - PROBLEM SELECTOR PLAN 1
Admitted  for R mini CHOP, found to be febrile on admission hold  chemo   Monitor CBC with diff, transfuse as needed.  Monitor electrolytes, replete as needed.  Daily weights.  Strict I/O. mouth care  Plans for any chemo or tafasitamab/lenalidomide are now ON HOLD given severe infections and fevers.  1/25 stable on HFNC. cont to hold planned therapy due to suspected PCP/PJC pneumonia Admitted  for R mini CHOP, found to be febrile on admission hold  chemo   Monitor CBC with diff, transfuse as needed.  Monitor electrolytes, replete as needed.  Daily weights.  Strict I/O. mouth care  Plans for any chemo or tafasitamab/lenalidomide are now ON HOLD given severe infections and fevers.  1/25 stable on HFNC. cont to hold planned therapy due to suspected PCP/PJC pneumonia  1/27 stable on HFNC, O2 requirements less, now 50% FiO2 and 50 LPM. No further plan for chemo and/or immunotherapy at present. Request made to transfer to medicine floor with Hospitalist coverage - accepted by Dr. Doug Sanchez when re-located to another unit. Trasplant ID consulted at recommendation by Transplant Nephro.

## 2025-01-27 NOTE — PROGRESS NOTE ADULT - SUBJECTIVE AND OBJECTIVE BOX
PULMONARY PROGRESS NOTE    PATIENT INFORMATION:  NAME: JAN CALVIN:  MRN: [id number]    CHIEF COMPLAINT: Patient is a 69y old  Male who presents with a chief complaint of "For chemo" (27 Jan 2025 07:40)      [x] INITIAL CONSULT, H&P, FAMILY HISTORY and PAST MEDICAL AND SURGICAL HISTORY REVIEWED    OVERNIGHT EVENTS, CHANGES TO HPI, SUBJECTIVE:   - Patient seen and examined at bedside  - No overnight events  - Symptoms stable/improving    ========================REVIEW OF SYSTEMS========================  [x] ROS negative except as per HPI    ========================MEDICATIONS=============================  NEURO  buPROPion XL (24-Hour) . 300 milliGRAM(s) Oral daily  escitalopram 10 milliGRAM(s) Oral daily    CARDIO  amLODIPine   Tablet 10 milliGRAM(s) Oral daily  carvedilol 12.5 milliGRAM(s) Oral every 12 hours  cloNIDine 0.1 milliGRAM(s) Oral three times a day  hydrALAZINE 100 milliGRAM(s) Oral every 8 hours    PULM    FEN/GI  dextrose 5%. 1000 milliLiter(s) IV Continuous <Continuous>  dextrose 5%. 1000 milliLiter(s) IV Continuous <Continuous>  lactulose Syrup 15 Gram(s) Oral <User Schedule>  lactulose Syrup 10 Gram(s) Oral every 6 hours  pantoprazole   Suspension 40 milliGRAM(s) Oral daily  polyethylene glycol 3350 17 Gram(s) Oral at bedtime  senna 2 Tablet(s) Oral at bedtime  sodium chloride 0.9%. 1000 milliLiter(s) IV Continuous <Continuous>    HEME/ONC  enoxaparin Injectable 40 milliGRAM(s) SubCutaneous <User Schedule>    ANTIMICROBIALS  trimethoprim / sulfamethoxazole IVPB 320 milliGRAM(s) IV Intermittent every 8 hours  valACYclovir 500 milliGRAM(s) Oral every 12 hours    ENDO  dextrose 50% Injectable 25 Gram(s) IV Push once  glucagon  Injectable 1 milliGRAM(s) IntraMuscular once  insulin glargine Injectable (LANTUS) 36 Unit(s) SubCutaneous at bedtime  insulin lispro (ADMELOG) corrective regimen sliding scale   SubCutaneous three times a day before meals  insulin lispro (ADMELOG) corrective regimen sliding scale   SubCutaneous at bedtime  insulin lispro Injectable (ADMELOG) 18 Unit(s) SubCutaneous three times a day before meals  levothyroxine 88 MICROGram(s) Oral daily  predniSONE   Tablet 40 milliGRAM(s) Oral two times a day  rosuvastatin 10 milliGRAM(s) Oral at bedtime    OTHER  artificial tears (preservative free) Ophthalmic Solution 1 Drop(s) Both EYES every 6 hours  chlorhexidine 4% Liquid 1 Application(s) Topical <User Schedule>      PRN      Drips      ========================PHYSICAL EXAM============================    VITALS: Vital Signs Last 24 Hrs  T(C): 36.7 (27 Jan 2025 05:23), Max: 37.1 (26 Jan 2025 09:22)  T(F): 98.1 (27 Jan 2025 05:23), Max: 98.7 (26 Jan 2025 09:22)  HR: 62 (27 Jan 2025 05:23) (62 - 74)  BP: 138/81 (27 Jan 2025 05:23) (121/52 - 156/72)  BP(mean): --  RR: 18 (27 Jan 2025 05:23) (18 - 18)  SpO2: 97% (27 Jan 2025 05:23) (94% - 100%)    Parameters below as of 27 Jan 2025 05:23  Patient On (Oxygen Delivery Method): nasal cannula, high flow  O2 Flow (L/min): 50  O2 Concentration (%): 70    INTAKE and OUTPUT: I&O's Detail    26 Jan 2025 07:01  -  27 Jan 2025 07:00  --------------------------------------------------------  IN:    IV PiggyBack: 1200 mL    Oral Fluid: 347 mL    sodium chloride 0.9%: 4257 mL  Total IN: 5804 mL    OUT:    Voided (mL): 3200 mL  Total OUT: 3200 mL    Total NET: 2604 mL          VENTILATOR SETTINGS:     Physical Exam  CONST:       ENMT:         RESP :         CHEST:        CARDS:      VASC:           ABD:            MSK:            NEURO:       SKIN:           ======================LABORATORY RESULTS AND IMAGING=============                        7.8    8.17  )-----------( 478      ( 27 Jan 2025 06:58 )             24.0                                  01-27    127[L]  |  94[L]  |  35[H]  ----------------------------<  301[H]  5.3   |  22  |  0.87    Ca    9.5      27 Jan 2025 07:00  Phos  2.8     01-27  Mg     1.8     01-27    TPro  5.3[L]  /  Alb  2.4[L]  /  TBili  0.2  /  DBili  x   /  AST  37  /  ALT  36  /  AlkPhos  89  01-27    N:7.66/L:0.11= __ 01-27-25 @ 06:58  N:11.12/L:0.09= __ 01-26-25 @ 06:53  N:8.08/L:0.08= __ 01-25-25 @ 06:37  N:9.29/L:0.19= __ 01-24-25 @ 07:30  N:7.04/L:0.20= __ 01-23-25 @ 06:57    Ref-range: <3 normal, >9 elevated, >18 very elevated    Procalcitonin: 0.52 ng/mL (01-20-25 @ 06:57)        ABG Trend  02-03-20 @ 22:15 FiO2--  - 7.43/27/78/20/96 Lactate --  10-15-16 @ 10:51 [id number]  - 7.42/33/76/22/95 Lactate --    VBG Trend  01-18-25 @ 02:40 FiO2--  - 7.48/34/186/25/98.8 Lactate 1.3  12-28-24 @ 18:46 FiO2--  - 7.41/45/52/28/82.1 Lactate --  02-04-20 @ 16:02 FiO2--  - 7.44/29/104/22/97 Lactate --      Creatinine Trend: 0.87<--, 1.01<--, 1.14<--, 1.17<--, 1.05<--, 0.93<--    [x] RADIOLOGY REVIEWED AND INTERPRETED BY ME     PULMONARY PROGRESS NOTE    PATIENT INFORMATION:  NAME: JAN CALVIN:  MRN: [id number]    CHIEF COMPLAINT: Patient is a 69y old  Male who presents with a chief complaint of "For chemo" (27 Jan 2025 07:40)      [x] INITIAL CONSULT, H&P, FAMILY HISTORY and PAST MEDICAL AND SURGICAL HISTORY REVIEWED    OVERNIGHT EVENTS, CHANGES TO HPI, SUBJECTIVE:   - Patient seen and examined at bedside  - No overnight events  - Symptoms stable/improving  Weaned to HFNC 50/50  Overall reports feeling much better from resp perspective    ========================REVIEW OF SYSTEMS========================  [x] ROS negative except as per HPI    ========================MEDICATIONS=============================  NEURO  buPROPion XL (24-Hour) . 300 milliGRAM(s) Oral daily  escitalopram 10 milliGRAM(s) Oral daily    CARDIO  amLODIPine   Tablet 10 milliGRAM(s) Oral daily  carvedilol 12.5 milliGRAM(s) Oral every 12 hours  cloNIDine 0.1 milliGRAM(s) Oral three times a day  hydrALAZINE 100 milliGRAM(s) Oral every 8 hours    PULM    FEN/GI  dextrose 5%. 1000 milliLiter(s) IV Continuous <Continuous>  dextrose 5%. 1000 milliLiter(s) IV Continuous <Continuous>  lactulose Syrup 15 Gram(s) Oral <User Schedule>  lactulose Syrup 10 Gram(s) Oral every 6 hours  pantoprazole   Suspension 40 milliGRAM(s) Oral daily  polyethylene glycol 3350 17 Gram(s) Oral at bedtime  senna 2 Tablet(s) Oral at bedtime  sodium chloride 0.9%. 1000 milliLiter(s) IV Continuous <Continuous>    HEME/ONC  enoxaparin Injectable 40 milliGRAM(s) SubCutaneous <User Schedule>    ANTIMICROBIALS  trimethoprim / sulfamethoxazole IVPB 320 milliGRAM(s) IV Intermittent every 8 hours  valACYclovir 500 milliGRAM(s) Oral every 12 hours    ENDO  dextrose 50% Injectable 25 Gram(s) IV Push once  glucagon  Injectable 1 milliGRAM(s) IntraMuscular once  insulin glargine Injectable (LANTUS) 36 Unit(s) SubCutaneous at bedtime  insulin lispro (ADMELOG) corrective regimen sliding scale   SubCutaneous three times a day before meals  insulin lispro (ADMELOG) corrective regimen sliding scale   SubCutaneous at bedtime  insulin lispro Injectable (ADMELOG) 18 Unit(s) SubCutaneous three times a day before meals  levothyroxine 88 MICROGram(s) Oral daily  predniSONE   Tablet 40 milliGRAM(s) Oral two times a day  rosuvastatin 10 milliGRAM(s) Oral at bedtime    OTHER  artificial tears (preservative free) Ophthalmic Solution 1 Drop(s) Both EYES every 6 hours  chlorhexidine 4% Liquid 1 Application(s) Topical <User Schedule>      PRN      Drips      ========================PHYSICAL EXAM============================    VITALS: Vital Signs Last 24 Hrs  T(C): 36.7 (27 Jan 2025 05:23), Max: 37.1 (26 Jan 2025 09:22)  T(F): 98.1 (27 Jan 2025 05:23), Max: 98.7 (26 Jan 2025 09:22)  HR: 62 (27 Jan 2025 05:23) (62 - 74)  BP: 138/81 (27 Jan 2025 05:23) (121/52 - 156/72)  BP(mean): --  RR: 18 (27 Jan 2025 05:23) (18 - 18)  SpO2: 97% (27 Jan 2025 05:23) (94% - 100%)    Parameters below as of 27 Jan 2025 05:23  Patient On (Oxygen Delivery Method): nasal cannula, high flow  O2 Flow (L/min): 50  O2 Concentration (%): 70    INTAKE and OUTPUT: I&O's Detail    26 Jan 2025 07:01  -  27 Jan 2025 07:00  --------------------------------------------------------  IN:    IV PiggyBack: 1200 mL    Oral Fluid: 347 mL    sodium chloride 0.9%: 4257 mL  Total IN: 5804 mL    OUT:    Voided (mL): 3200 mL  Total OUT: 3200 mL    Total NET: 2604 mL          VENTILATOR SETTINGS:     Physical Exam  General: WN/WD NAD  HEENT: PERRL, EOMI, moist mucous membranes  Neurology: A&Ox3, nonfocal, GARDNER x 4  Respiratory: crackles  CV: RRR, S1S2, no murmurs, rubs or gallops  Abdominal: Soft, NT, ND +BS  Extremities: No edema, + peripheral pulses        ======================LABORATORY RESULTS AND IMAGING=============                        7.8    8.17  )-----------( 478      ( 27 Jan 2025 06:58 )             24.0                                  01-27    127[L]  |  94[L]  |  35[H]  ----------------------------<  301[H]  5.3   |  22  |  0.87    Ca    9.5      27 Jan 2025 07:00  Phos  2.8     01-27  Mg     1.8     01-27    TPro  5.3[L]  /  Alb  2.4[L]  /  TBili  0.2  /  DBili  x   /  AST  37  /  ALT  36  /  AlkPhos  89  01-27    N:7.66/L:0.11= __ 01-27-25 @ 06:58  N:11.12/L:0.09= __ 01-26-25 @ 06:53  N:8.08/L:0.08= __ 01-25-25 @ 06:37  N:9.29/L:0.19= __ 01-24-25 @ 07:30  N:7.04/L:0.20= __ 01-23-25 @ 06:57    Ref-range: <3 normal, >9 elevated, >18 very elevated    Procalcitonin: 0.52 ng/mL (01-20-25 @ 06:57)        ABG Trend  02-03-20 @ 22:15 FiO2--  - 7.43/27/78/20/96 Lactate --  10-15-16 @ 10:51 [id number]  - 7.42/33/76/22/95 Lactate --    VBG Trend  01-18-25 @ 02:40 FiO2--  - 7.48/34/186/25/98.8 Lactate 1.3  12-28-24 @ 18:46 FiO2--  - 7.41/45/52/28/82.1 Lactate --  02-04-20 @ 16:02 FiO2--  - 7.44/29/104/22/97 Lactate --      Creatinine Trend: 0.87<--, 1.01<--, 1.14<--, 1.17<--, 1.05<--, 0.93<--    [x] RADIOLOGY REVIEWED AND INTERPRETED BY ME

## 2025-01-27 NOTE — PROGRESS NOTE ADULT - PROBLEM SELECTOR PLAN 2
Not neutropenic. febrile   1/17- F/u Flu/COVID PCR , + for Coronavirus   (last hospitalization: osteomyelitis and E coli urosepsis (12/1/24 - 12/18/24)   [facility] (12/18/24): for osteomyelitis & E coli urosepsis).  1/18 - Oral candidiasis - Nystatin S/S  1/19- ID consult, followed by [facility] ID, Zosyn changed to Ertapenem  history of ESBL Kleb in Ucx 12/31/24, ESBL E.coli 11/29/24 Ucx  1/24 - CT chest with c/f new PCP pneumonia as well as unchanged sacral OM. ID recs DC IV vanc, switch IV erta to IV mick given hypoalbuminemia efficacy concerns and start IV bactrim for empiric PCP PNA treatment as well as steroid taper pred 40mg BID x5 days followed by pred 40mg QD x5d then 20mg QD.   1/24 fungitell high > 500; pending galactoman, beta D glucan  1/26- D/Noble Meropenem Not neutropenic. febrile   1/17- F/u Flu/COVID PCR , + for Coronavirus   (last hospitalization: osteomyelitis and E coli urosepsis (12/1/24 - 12/18/24)   [facility] (12/18/24): for osteomyelitis & E coli urosepsis).  1/18 - Oral candidiasis - Nystatin S/S  1/19- ID consult, followed by [facility] ID, Zosyn changed to Ertapenem  history of ESBL Kleb in Ucx 12/31/24, ESBL E.coli 11/29/24 Ucx  1/24 - CT chest with c/f new PCP pneumonia as well as unchanged sacral OM. ID recs DC IV vanc, switch IV erta to IV mick given hypoalbuminemia efficacy concerns and start IV bactrim for empiric PCP PNA treatment as well as steroid taper pred 40mg BID x5 days followed by pred 40mg QD x5d then 20mg QD.   1/24 fungitell high > 500; pending galactoman, beta D glucan  1/26- D/Noble Meropenem, not neutropenic  1/27 cont IV Bactrim for PCP PNA, valtrex ppx

## 2025-01-27 NOTE — PROGRESS NOTE ADULT - ASSESSMENT
70 y/o M PMHx renal transplant 2012 (2/2 DM, s/p left nephrectomy), peripheral neuropathy, hypothyroidism, retroperitoneal fibrosis, HTN, HLD, GERD, anxiety/depression, ANGELIKA and recent admission at [facility] for sacral osteomyelitis and ESBL.coli urosepsis discharged on 12/18/24 to rehab. While admitted to [place] was noted to have hypercalcemia and liver masses which were biopsied on 12/16/24, biopsy revealed DLBCL. Patient received R mini CHOP on 12/28 now admitted for cycle #2 R-mini CHOP, upon admission patient is febrile and Chemotherapy is on HOLD. Pt has been pancytopenic secondary to chemotherapy as well as infection. Currently undergoing workup or suspected PCP/PJC pneumonia.

## 2025-01-27 NOTE — PROGRESS NOTE ADULT - SUBJECTIVE AND OBJECTIVE BOX
Diagnosis: PTLD    Protocol/Chemo Regimen: Plan for Tafa/Revlimid on HOLD    Pt endorsed: appears agitated does not want to continue treatment, or Oxygen    Review of Systems:  Denies pain    Pain scale:  unable to rate    Diet:  NPO    Allergies: No Known Allergies    ANTIMICROBIALS  trimethoprim / sulfamethoxazole IVPB 320 milliGRAM(s) IV Intermittent every 8 hours  valACYclovir 500 milliGRAM(s) Oral every 12 hours    HEME/ONC MEDICATIONS  enoxaparin Injectable 40 milliGRAM(s) SubCutaneous <User Schedule>      STANDING MEDICATIONS  amLODIPine   Tablet 10 milliGRAM(s) Oral daily  artificial tears (preservative free) Ophthalmic Solution 1 Drop(s) Both EYES every 6 hours  buPROPion XL (24-Hour) . 300 milliGRAM(s) Oral daily  carvedilol 12.5 milliGRAM(s) Oral every 12 hours  chlorhexidine 4% Liquid 1 Application(s) Topical <User Schedule>  cloNIDine 0.1 milliGRAM(s) Oral three times a day  dextrose 5%. 1000 milliLiter(s) IV Continuous <Continuous>  dextrose 5%. 1000 milliLiter(s) IV Continuous <Continuous>  dextrose 50% Injectable 25 Gram(s) IV Push once  escitalopram 10 milliGRAM(s) Oral daily  glucagon  Injectable 1 milliGRAM(s) IntraMuscular once  hydrALAZINE 100 milliGRAM(s) Oral every 8 hours  insulin glargine Injectable (LANTUS) 36 Unit(s) SubCutaneous at bedtime  insulin lispro (ADMELOG) corrective regimen sliding scale   SubCutaneous three times a day before meals  insulin lispro (ADMELOG) corrective regimen sliding scale   SubCutaneous at bedtime  insulin lispro Injectable (ADMELOG) 18 Unit(s) SubCutaneous three times a day before meals  lactulose Syrup 15 Gram(s) Oral <User Schedule>  lactulose Syrup 10 Gram(s) Oral every 6 hours  levothyroxine 88 MICROGram(s) Oral daily  pantoprazole   Suspension 40 milliGRAM(s) Oral daily  polyethylene glycol 3350 17 Gram(s) Oral at bedtime  predniSONE   Tablet 40 milliGRAM(s) Oral two times a day  rosuvastatin 10 milliGRAM(s) Oral at bedtime  senna 2 Tablet(s) Oral at bedtime  sodium chloride 0.9%. 1000 milliLiter(s) IV Continuous <Continuous>    PRN MEDICATIONS  acetaminophen     Tablet .. 650 milliGRAM(s) Oral every 6 hours PRN  dextrose Oral Gel 15 Gram(s) Oral once PRN  polyethylene glycol 3350 17 Gram(s) Oral daily PRN  sodium chloride 0.9% lock flush 10 milliLiter(s) IV Push every 1 hour PRN    Vital Signs Last 24 Hrs  T(C): 36.7 (27 Jan 2025 05:23), Max: 37.1 (26 Jan 2025 09:22)  T(F): 98.1 (27 Jan 2025 05:23), Max: 98.7 (26 Jan 2025 09:22)  HR: 62 (27 Jan 2025 05:23) (62 - 74)  BP: 138/81 (27 Jan 2025 05:23) (121/52 - 156/72)  RR: 18 (27 Jan 2025 05:23) (18 - 18)  SpO2: 97% (27 Jan 2025 05:23) (94% - 100%)    Parameters below as of 27 Jan 2025 05:23  Patient On (Oxygen Delivery Method): nasal cannula, high flow  O2 Flow (L/min): 50  O2 Concentration (%): 70    PHYSICAL EXAM  General: adult in NAD  HEENT: clear oropharynx, anicteric sclera  Neck: supple  CV: normal S1/S2 RRR  Lungs: positive air movement on HFNC  Abdomen: soft non-tender non-distended, (+) BS  Ext: no edema  Skin: no rashes and no petechiae  Neuro: alert and oriented X 3, no focal deficits  Central Line: PICC c/d/i    LABS:                        7.8    8.17  )-----------( 478      ( 27 Jan 2025 06:58 )             24.0     Mean Cell Volume : 88.6 fl  Mean Cell Hemoglobin : 28.8 pg  Mean Cell Hemoglobin Concentration : 32.5 g/dL  Auto Neutrophil # : 7.66 K/uL  Auto Lymphocyte # : 0.11 K/uL  Auto Monocyte # : 0.24 K/uL  Auto Eosinophil # : 0.00 K/uL  Auto Basophil # : 0.01 K/uL  Auto Neutrophil % : 93.9 %  Auto Lymphocyte % : 1.3 %  Auto Monocyte % : 2.9 %  Auto Eosinophil % : 0.0 %  Auto Basophil % : 0.1 %    --------------------    Cultures:  Culture - Blood (01.24.25 @ 00:46)    Specimen Source: .Blood BLOOD   Culture Results:   No growth at 24 hours    Culture - Blood (01.24.25 @ 00:18)    Specimen Source: .Blood BLOOD   Culture Results:   No growth at 24 hours    Culture - Urine (01.22.25 @ 05:05)    Specimen Source: Clean Catch Clean Catch (Midstream)   Culture Results:   10,000 - 49,000 CFU/mL Candida albicans  "Susceptibilities not performed"    Fungitell (01.24.25 @ 00:46)    Fungitell: >500: Interpretation: The Fungitell assay does not detect certain fungal  species such as the genus Cryptococcus (Gerardo et al. 1991) which  produces very low levels of (1-3)-Beta-D-Glucan. The assay also does  not detect the Zygomycetes such as Absidia, Mucor and Rhizopus  (Hanna et al. 1994) which are not known to produce  (1-3)-Beta-D-Glucan. In addition, the yeast phase of Blastomyces  dermatitidis produces little (1-3)-Beta-D-Glucan and may not be  detected by the assay (Randy et al. 2007).  Reference Range:  Less than 60 pg/mL. Glucan values of less than 60 pg/mL are  interpreted as negative.  Glucan values of 60 to 79 pg/mL are interpreted as indeterminate,  and suggest a possible fungal infection. Additional sampling and  testing of sera is required to interpret the results.  Glucan values of greater than or equal to 80 pg/mL are interpreted  as positive.        ----------------    RADIOLOGY & ADDITIONAL STUDIES:  from: CT Abdomen and Pelvis No Cont (01.23.25 @ 17:07)   PROCEDURE:  CT of the Chest, Abdomen and Pelvis was performed.  Sagittal and coronal reformats were performed.    FINDINGS:  CHEST:  LUNGS AND LARGE AIRWAYS: Patent central airways. Diffuse bilateral   predominantly perihilar alveolar opacities. Left base atelectasis  PLEURA: Small left effusion and tiny right effusion  VESSELS: Within normal limits.  HEART:Heart size is normal. No pericardial effusion.  MEDIASTINUM AND PATRICE: No lymphadenopathy.  CHEST WALL AND LOWER NECK: Within normal limits.    ABDOMEN AND PELVIS:  LIVER: 3.2 cm right hepatic lobe hypodensity without significant change.  BILE DUCTS: Normal caliber.  GALLBLADDER: Cholecystectomy.  SPLEEN: Within normal limits.  PANCREAS: Within normal limits.  ADRENALS: Within normal limits.  KIDNEYS/URETERS: Left nephrectomy and atrophic right kidney with 1.4 cm   indeterminate lesion in the interpolar region, unchanged. Right lower   quadrant renal transplant.    BLADDER: Within normal limits.  REPRODUCTIVE ORGANS: Prostate within normal limits.    BOWEL: No bowel obstruction. Appendix is not visualized.  PERITONEUM/RETROPERITONEUM: Unchanged retroperitoneal soft tissue   encasing the aorta and IVC.  VESSELS: Within normal limits.  LYMPH NODES: No lymphadenopathy.  ABDOMINAL WALL: Ventral abdominal wall postsurgical changes with an   unchanged 3.7 cm partially calcified fat and soft tissue containing   collection (3-270).  BONES: Diffuse erosive changes of the sacrum with soft tissue   infiltration, similar in appearance to prior. Sclerotic focus in the left   anterolateral seventh rib. Chronic rib fractures. Chronic left humeral   fracture. Left hip replacement    IMPRESSION:    Diffuse bilateral patchy groundglass opacities with central predominance,   increased from 1/17/2025, may represent edema or pneumonia.  Unchanged left lower lobe round atelectasis.  Small left pleural effusion.  Unchanged retroperitoneal soft tissue encasing the aorta and IVC.  Diffuse erosive changes of the sacrum with soft tissue infiltration,   similar in appearance to prior, which could represent osteomyelitis.                 Diagnosis: PTLD    Protocol/Chemo Regimen: Plan for Tafa/Revlimid on HOLD    Pt endorsed: appears agitated does not want to continue treatment, or Oxygen    Review of Systems:  Denies pain    Pain scale:  unable to rate    Diet:  NPO    Allergies: No Known Allergies    ANTIMICROBIALS  trimethoprim / sulfamethoxazole IVPB 320 milliGRAM(s) IV Intermittent every 8 hours  valACYclovir 500 milliGRAM(s) Oral every 12 hours    HEME/ONC MEDICATIONS  enoxaparin Injectable 40 milliGRAM(s) SubCutaneous <User Schedule>      STANDING MEDICATIONS  amLODIPine   Tablet 10 milliGRAM(s) Oral daily  artificial tears (preservative free) Ophthalmic Solution 1 Drop(s) Both EYES every 6 hours  buPROPion XL (24-Hour) . 300 milliGRAM(s) Oral daily  carvedilol 12.5 milliGRAM(s) Oral every 12 hours  chlorhexidine 4% Liquid 1 Application(s) Topical <User Schedule>  cloNIDine 0.1 milliGRAM(s) Oral three times a day  dextrose 5%. 1000 milliLiter(s) IV Continuous <Continuous>  dextrose 5%. 1000 milliLiter(s) IV Continuous <Continuous>  dextrose 50% Injectable 25 Gram(s) IV Push once  escitalopram 10 milliGRAM(s) Oral daily  glucagon  Injectable 1 milliGRAM(s) IntraMuscular once  hydrALAZINE 100 milliGRAM(s) Oral every 8 hours  insulin glargine Injectable (LANTUS) 36 Unit(s) SubCutaneous at bedtime  insulin lispro (ADMELOG) corrective regimen sliding scale   SubCutaneous three times a day before meals  insulin lispro (ADMELOG) corrective regimen sliding scale   SubCutaneous at bedtime  insulin lispro Injectable (ADMELOG) 18 Unit(s) SubCutaneous three times a day before meals  lactulose Syrup 15 Gram(s) Oral <User Schedule>  lactulose Syrup 10 Gram(s) Oral every 6 hours  levothyroxine 88 MICROGram(s) Oral daily  pantoprazole   Suspension 40 milliGRAM(s) Oral daily  polyethylene glycol 3350 17 Gram(s) Oral at bedtime  predniSONE   Tablet 40 milliGRAM(s) Oral two times a day  rosuvastatin 10 milliGRAM(s) Oral at bedtime  senna 2 Tablet(s) Oral at bedtime  sodium chloride 0.9%. 1000 milliLiter(s) IV Continuous <Continuous>    PRN MEDICATIONS  acetaminophen     Tablet .. 650 milliGRAM(s) Oral every 6 hours PRN  dextrose Oral Gel 15 Gram(s) Oral once PRN  polyethylene glycol 3350 17 Gram(s) Oral daily PRN  sodium chloride 0.9% lock flush 10 milliLiter(s) IV Push every 1 hour PRN    Vital Signs Last 24 Hrs  T(C): 36.7 (27 Jan 2025 05:23), Max: 37.1 (26 Jan 2025 09:22)  T(F): 98.1 (27 Jan 2025 05:23), Max: 98.7 (26 Jan 2025 09:22)  HR: 62 (27 Jan 2025 05:23) (62 - 74)  BP: 138/81 (27 Jan 2025 05:23) (121/52 - 156/72)  RR: 18 (27 Jan 2025 05:23) (18 - 18)  SpO2: 97% (27 Jan 2025 05:23) (94% - 100%)    Parameters below as of 27 Jan 2025 05:23  Patient On (Oxygen Delivery Method): nasal cannula, high flow  O2 Flow (L/min): 50  O2 Concentration (%): 70    PHYSICAL EXAM  General: adult in NAD  HEENT: clear oropharynx, anicteric sclera  Neck: supple  CV: normal S1/S2 RRR  Lungs: positive air movement on HFNC  Abdomen: soft non-tender non-distended, (+) BS  Ext: no edema  Skin: no rashes and no petechiae  Neuro: alert and oriented X 3, no focal deficits  Central Line: PICC c/d/i    LABS:                        7.8    8.17  )-----------( 478      ( 27 Jan 2025 06:58 )             24.0     Mean Cell Volume : 88.6 fl  Mean Cell Hemoglobin : 28.8 pg  Mean Cell Hemoglobin Concentration : 32.5 g/dL  Auto Neutrophil # : 7.66 K/uL  Auto Lymphocyte # : 0.11 K/uL  Auto Monocyte # : 0.24 K/uL  Auto Eosinophil # : 0.00 K/uL  Auto Basophil # : 0.01 K/uL  Auto Neutrophil % : 93.9 %  Auto Lymphocyte % : 1.3 %  Auto Monocyte % : 2.9 %  Auto Eosinophil % : 0.0 %  Auto Basophil % : 0.1 %    27 Jan 2025 07:00    127    |  94     |  35     ----------------------------<  301    5.3     |  22     |  0.87     Ca    9.5        27 Jan 2025 07:00  Phos  2.8       27 Jan 2025 07:00  Mg     1.8       27 Jan 2025 07:00    TPro  5.3    /  Alb  2.4    /  TBili  0.2    /  DBili  x      /  AST  37     /  ALT  36     /  AlkPhos  89     27 Jan 2025 07:00    CAPILLARY BLOOD GLUCOSE  POCT Blood Glucose.: 363 mg/dL (27 Jan 2025 09:41)  POCT Blood Glucose.: 238 mg/dL (26 Jan 2025 22:40)  POCT Blood Glucose.: 361 mg/dL (26 Jan 2025 17:02)  POCT Blood Glucose.: 381 mg/dL (26 Jan 2025 13:33)    LIVER FUNCTIONS - ( 27 Jan 2025 07:00 )  Alb: 2.4 g/dL / Pro: 5.3 g/dL / ALK PHOS: 89 U/L / ALT: 36 U/L / AST: 37 U/L / GGT: x           Urinalysis Basic - ( 27 Jan 2025 07:00 )  Color: x / Appearance: x / SG: x / pH: x  Gluc: 301 mg/dL / Ketone: x  / Bili: x / Urobili: x   Blood: x / Protein: x / Nitrite: x   Leuk Esterase: x / RBC: x / WBC x   Sq Epi: x / Non Sq Epi: x / Bacteria: x    --------------------    Cultures:  Culture - Blood (01.24.25 @ 00:46)    Specimen Source: .Blood BLOOD   Culture Results:   No growth at 24 hours    Culture - Blood (01.24.25 @ 00:18)    Specimen Source: .Blood BLOOD   Culture Results:   No growth at 24 hours    Culture - Urine (01.22.25 @ 05:05)    Specimen Source: Clean Catch Clean Catch (Midstream)   Culture Results:   10,000 - 49,000 CFU/mL Candida albicans  "Susceptibilities not performed"    Fungitell (01.24.25 @ 00:46)    Fungitell: >500: Interpretation: The Fungitell assay does not detect certain fungal  species such as the genus Cryptococcus (Gerardo barrios alMerary 1991) which  produces very low levels of (1-3)-Beta-D-Glucan. The assay also does  not detect the Zygomycetes such as Absidia, Mucor and Rhizopus  (Hanna et al. 1994) which are not known to produce  (1-3)-Beta-D-Glucan. In addition, the yeast phase of Blastomyces  dermatitidis produces little (1-3)-Beta-D-Glucan and may not be  detected by the assay (Randy et al. 2007).  Reference Range:  Less than 60 pg/mL. Glucan values of less than 60 pg/mL are  interpreted as negative.  Glucan values of 60 to 79 pg/mL are interpreted as indeterminate,  and suggest a possible fungal infection. Additional sampling and  testing of sera is required to interpret the results.  Glucan values of greater than or equal to 80 pg/mL are interpreted  as positive.    ----------------    RADIOLOGY & ADDITIONAL STUDIES:  from: CT Abdomen and Pelvis No Cont (01.23.25 @ 17:07)   PROCEDURE:  CT of the Chest, Abdomen and Pelvis was performed.  Sagittal and coronal reformats were performed.    FINDINGS:  CHEST:  LUNGS AND LARGE AIRWAYS: Patent central airways. Diffuse bilateral   predominantly perihilar alveolar opacities. Left base atelectasis  PLEURA: Small left effusion and tiny right effusion  VESSELS: Within normal limits.  HEART:Heart size is normal. No pericardial effusion.  MEDIASTINUM AND PATRICE: No lymphadenopathy.  CHEST WALL AND LOWER NECK: Within normal limits.    ABDOMEN AND PELVIS:  LIVER: 3.2 cm right hepatic lobe hypodensity without significant change.  BILE DUCTS: Normal caliber.  GALLBLADDER: Cholecystectomy.  SPLEEN: Within normal limits.  PANCREAS: Within normal limits.  ADRENALS: Within normal limits.  KIDNEYS/URETERS: Left nephrectomy and atrophic right kidney with 1.4 cm   indeterminate lesion in the interpolar region, unchanged. Right lower   quadrant renal transplant.    BLADDER: Within normal limits.  REPRODUCTIVE ORGANS: Prostate within normal limits.    BOWEL: No bowel obstruction. Appendix is not visualized.  PERITONEUM/RETROPERITONEUM: Unchanged retroperitoneal soft tissue   encasing the aorta and IVC.  VESSELS: Within normal limits.  LYMPH NODES: No lymphadenopathy.  ABDOMINAL WALL: Ventral abdominal wall postsurgical changes with an   unchanged 3.7 cm partially calcified fat and soft tissue containing   collection (3-270).  BONES: Diffuse erosive changes of the sacrum with soft tissue   infiltration, similar in appearance to prior. Sclerotic focus in the left   anterolateral seventh rib. Chronic rib fractures. Chronic left humeral   fracture. Left hip replacement    IMPRESSION:    Diffuse bilateral patchy groundglass opacities with central predominance,   increased from 1/17/2025, may represent edema or pneumonia.  Unchanged left lower lobe round atelectasis.  Small left pleural effusion.  Unchanged retroperitoneal soft tissue encasing the aorta and IVC.  Diffuse erosive changes of the sacrum with soft tissue infiltration,   similar in appearance to prior, which could represent osteomyelitis.                 Diagnosis: PTLD    Protocol/Chemo Regimen: Plan for Tafa/Revlimid on HOLD    Pt endorsed: calm today    Review of Systems:  Denies pain    Pain scale:  unable to rate    Diet:  NPO    Allergies: No Known Allergies    ANTIMICROBIALS  trimethoprim / sulfamethoxazole IVPB 320 milliGRAM(s) IV Intermittent every 8 hours  valACYclovir 500 milliGRAM(s) Oral every 12 hours    HEME/ONC MEDICATIONS  enoxaparin Injectable 40 milliGRAM(s) SubCutaneous <User Schedule>      STANDING MEDICATIONS  amLODIPine   Tablet 10 milliGRAM(s) Oral daily  artificial tears (preservative free) Ophthalmic Solution 1 Drop(s) Both EYES every 6 hours  buPROPion XL (24-Hour) . 300 milliGRAM(s) Oral daily  carvedilol 12.5 milliGRAM(s) Oral every 12 hours  chlorhexidine 4% Liquid 1 Application(s) Topical <User Schedule>  cloNIDine 0.1 milliGRAM(s) Oral three times a day  dextrose 5%. 1000 milliLiter(s) IV Continuous <Continuous>  dextrose 5%. 1000 milliLiter(s) IV Continuous <Continuous>  dextrose 50% Injectable 25 Gram(s) IV Push once  escitalopram 10 milliGRAM(s) Oral daily  glucagon  Injectable 1 milliGRAM(s) IntraMuscular once  hydrALAZINE 100 milliGRAM(s) Oral every 8 hours  insulin glargine Injectable (LANTUS) 36 Unit(s) SubCutaneous at bedtime  insulin lispro (ADMELOG) corrective regimen sliding scale   SubCutaneous three times a day before meals  insulin lispro (ADMELOG) corrective regimen sliding scale   SubCutaneous at bedtime  insulin lispro Injectable (ADMELOG) 18 Unit(s) SubCutaneous three times a day before meals  lactulose Syrup 15 Gram(s) Oral <User Schedule>  lactulose Syrup 10 Gram(s) Oral every 6 hours  levothyroxine 88 MICROGram(s) Oral daily  pantoprazole   Suspension 40 milliGRAM(s) Oral daily  polyethylene glycol 3350 17 Gram(s) Oral at bedtime  predniSONE   Tablet 40 milliGRAM(s) Oral two times a day  rosuvastatin 10 milliGRAM(s) Oral at bedtime  senna 2 Tablet(s) Oral at bedtime  sodium chloride 0.9%. 1000 milliLiter(s) IV Continuous <Continuous>    PRN MEDICATIONS  acetaminophen     Tablet .. 650 milliGRAM(s) Oral every 6 hours PRN  dextrose Oral Gel 15 Gram(s) Oral once PRN  polyethylene glycol 3350 17 Gram(s) Oral daily PRN  sodium chloride 0.9% lock flush 10 milliLiter(s) IV Push every 1 hour PRN    Vital Signs Last 24 Hrs  T(C): 36.7 (27 Jan 2025 05:23), Max: 37.1 (26 Jan 2025 09:22)  T(F): 98.1 (27 Jan 2025 05:23), Max: 98.7 (26 Jan 2025 09:22)  HR: 62 (27 Jan 2025 05:23) (62 - 74)  BP: 138/81 (27 Jan 2025 05:23) (121/52 - 156/72)  RR: 18 (27 Jan 2025 05:23) (18 - 18)  SpO2: 97% (27 Jan 2025 05:23) (94% - 100%)    Parameters below as of 27 Jan 2025 05:23  Patient On (Oxygen Delivery Method): nasal cannula, high flow  O2 Flow (L/min): 50  O2 Concentration (%): 70    PHYSICAL EXAM  General: adult in NAD  HEENT: clear oropharynx, anicteric sclera  Neck: supple  CV: normal S1/S2 RRR  Lungs: positive air movement on HFNC  Abdomen: soft non-tender non-distended, (+) BS  Ext: no edema  Skin: no rashes and no petechiae  Neuro: alert and oriented X 3, no focal deficits  Central Line: PICC c/d/i    LABS:                        7.8    8.17  )-----------( 478      ( 27 Jan 2025 06:58 )             24.0     Mean Cell Volume : 88.6 fl  Mean Cell Hemoglobin : 28.8 pg  Mean Cell Hemoglobin Concentration : 32.5 g/dL  Auto Neutrophil # : 7.66 K/uL  Auto Lymphocyte # : 0.11 K/uL  Auto Monocyte # : 0.24 K/uL  Auto Eosinophil # : 0.00 K/uL  Auto Basophil # : 0.01 K/uL  Auto Neutrophil % : 93.9 %  Auto Lymphocyte % : 1.3 %  Auto Monocyte % : 2.9 %  Auto Eosinophil % : 0.0 %  Auto Basophil % : 0.1 %    27 Jan 2025 07:00    127    |  94     |  35     ----------------------------<  301    5.3     |  22     |  0.87     Ca    9.5        27 Jan 2025 07:00  Phos  2.8       27 Jan 2025 07:00  Mg     1.8       27 Jan 2025 07:00    TPro  5.3    /  Alb  2.4    /  TBili  0.2    /  DBili  x      /  AST  37     /  ALT  36     /  AlkPhos  89     27 Jan 2025 07:00    CAPILLARY BLOOD GLUCOSE  POCT Blood Glucose.: 363 mg/dL (27 Jan 2025 09:41)  POCT Blood Glucose.: 238 mg/dL (26 Jan 2025 22:40)  POCT Blood Glucose.: 361 mg/dL (26 Jan 2025 17:02)  POCT Blood Glucose.: 381 mg/dL (26 Jan 2025 13:33)    LIVER FUNCTIONS - ( 27 Jan 2025 07:00 )  Alb: 2.4 g/dL / Pro: 5.3 g/dL / ALK PHOS: 89 U/L / ALT: 36 U/L / AST: 37 U/L / GGT: x           Urinalysis Basic - ( 27 Jan 2025 07:00 )  Color: x / Appearance: x / SG: x / pH: x  Gluc: 301 mg/dL / Ketone: x  / Bili: x / Urobili: x   Blood: x / Protein: x / Nitrite: x   Leuk Esterase: x / RBC: x / WBC x   Sq Epi: x / Non Sq Epi: x / Bacteria: x    --------------------    Cultures:  Culture - Blood (01.24.25 @ 00:46)    Specimen Source: .Blood BLOOD   Culture Results:   No growth at 24 hours    Culture - Blood (01.24.25 @ 00:18)    Specimen Source: .Blood BLOOD   Culture Results:   No growth at 24 hours    Culture - Urine (01.22.25 @ 05:05)    Specimen Source: Clean Catch Clean Catch (Midstream)   Culture Results:   10,000 - 49,000 CFU/mL Candida albicans  "Susceptibilities not performed"    Fungitell (01.24.25 @ 00:46)    Fungitell: >500: Interpretation: The Fungitell assay does not detect certain fungal  species such as the genus Cryptococcus (Gerardo barrios alMerary 1991) which  produces very low levels of (1-3)-Beta-D-Glucan. The assay also does  not detect the Zygomycetes such as Absidia, Mucor and Rhizopus  (Hanna et al. 1994) which are not known to produce  (1-3)-Beta-D-Glucan. In addition, the yeast phase of Blastomyces  dermatitidis produces little (1-3)-Beta-D-Glucan and may not be  detected by the assay (Randy et al. 2007).  Reference Range:  Less than 60 pg/mL. Glucan values of less than 60 pg/mL are  interpreted as negative.  Glucan values of 60 to 79 pg/mL are interpreted as indeterminate,  and suggest a possible fungal infection. Additional sampling and  testing of sera is required to interpret the results.  Glucan values of greater than or equal to 80 pg/mL are interpreted  as positive.    ----------------    RADIOLOGY & ADDITIONAL STUDIES:  from: CT Abdomen and Pelvis No Cont (01.23.25 @ 17:07)   PROCEDURE:  CT of the Chest, Abdomen and Pelvis was performed.  Sagittal and coronal reformats were performed.    FINDINGS:  CHEST:  LUNGS AND LARGE AIRWAYS: Patent central airways. Diffuse bilateral   predominantly perihilar alveolar opacities. Left base atelectasis  PLEURA: Small left effusion and tiny right effusion  VESSELS: Within normal limits.  HEART:Heart size is normal. No pericardial effusion.  MEDIASTINUM AND PATRICE: No lymphadenopathy.  CHEST WALL AND LOWER NECK: Within normal limits.    ABDOMEN AND PELVIS:  LIVER: 3.2 cm right hepatic lobe hypodensity without significant change.  BILE DUCTS: Normal caliber.  GALLBLADDER: Cholecystectomy.  SPLEEN: Within normal limits.  PANCREAS: Within normal limits.  ADRENALS: Within normal limits.  KIDNEYS/URETERS: Left nephrectomy and atrophic right kidney with 1.4 cm   indeterminate lesion in the interpolar region, unchanged. Right lower   quadrant renal transplant.    BLADDER: Within normal limits.  REPRODUCTIVE ORGANS: Prostate within normal limits.    BOWEL: No bowel obstruction. Appendix is not visualized.  PERITONEUM/RETROPERITONEUM: Unchanged retroperitoneal soft tissue   encasing the aorta and IVC.  VESSELS: Within normal limits.  LYMPH NODES: No lymphadenopathy.  ABDOMINAL WALL: Ventral abdominal wall postsurgical changes with an   unchanged 3.7 cm partially calcified fat and soft tissue containing   collection (3-270).  BONES: Diffuse erosive changes of the sacrum with soft tissue   infiltration, similar in appearance to prior. Sclerotic focus in the left   anterolateral seventh rib. Chronic rib fractures. Chronic left humeral   fracture. Left hip replacement    IMPRESSION:    Diffuse bilateral patchy groundglass opacities with central predominance,   increased from 1/17/2025, may represent edema or pneumonia.  Unchanged left lower lobe round atelectasis.  Small left pleural effusion.  Unchanged retroperitoneal soft tissue encasing the aorta and IVC.  Diffuse erosive changes of the sacrum with soft tissue infiltration,   similar in appearance to prior, which could represent osteomyelitis.

## 2025-01-27 NOTE — PROGRESS NOTE ADULT - ASSESSMENT
69 y.o. M w/ PMHx DM, HTN, HLD, ESRD on HD s/p LKRT 2012 (from brother), hypothyroidism, recent admission to [facility] 11/30/24-12/18/24 for AMS found to have sacral OM and E. Coli bacteremia s/p treatment with meropenem till 12/10, complicated by hypercalcemia  (s/p calcitonin, aredia, and started on prednisone by nephrology), found to have new DLBCL on liver biopsy 12/16/24 transferred to [facility] from rehab to start R-CHOP therapy. Pt. received Mini CHOP on 12/28, now admitted for cycle #2 R mini CHOP.    1. LKRT 2012 from brother  - Baseline Scr ~0.8  - allograft w/ good fxn today     2. IS  - Stop Tacro and all other IS in setting of PCP and taper prednisone to 10mg daily  - Bactrim, valacyclovir      3. Fever, SOB, and suspected PJP  - On Bactrim and steroid taper   - IS as above, and R-CHOP currently held   -Transplant ID and pulmonary following     If you have any questions, please feel free to contact me  Cathie Mead  Nephrology Fellow  [employer]/Page [phone]  (After 5pm or on weekends please page the on-call fellow)

## 2025-01-27 NOTE — PROGRESS NOTE ADULT - NUTRITIONAL ASSESSMENT
This patient has been assessed with a concern for Malnutrition and has been determined to have a diagnosis/diagnoses of Moderate protein-calorie malnutrition.    This patient is being managed with:   Diet Consistent Carbohydrate/No Snacks-  Supplement Feeding Modality:  Oral  Glucerna Shake Cans or Servings Per Day:  1       Frequency:  Daily  Entered: Jan 25 2025  1:11PM

## 2025-01-27 NOTE — PROGRESS NOTE ADULT - PROBLEM SELECTOR PLAN 11
1/26- agitated this afternoon, appears irriatble wants to remove Hiflo nasal canula, Haldol 0.5 mg was given, contacted family (wife - Ludmila), maintained calm for 2 hrs while wife and daughter was present.  ~ 5 Pm agitated again does n0t wantt o continue treatment, attempt to calm patient down have been met with much resistance, therfore Haldol 1mg IV given, will monitor closely.

## 2025-01-27 NOTE — PROGRESS NOTE ADULT - ASSESSMENT
70 y/o M PMHx renal transplant 2012 (2/2 DM, s/p left nephrectomy), peripheral neuropathy, hypothyroidism, retroperitoneal fibrosis, HTN, HLD, GERD, anxiety/depression, ANGELIKA and recent admission at [facility] for sacral osteomyelitis and ESBL.coli urosepsis discharged on 12/18/24 to rehab. While admitted to [place] was noted to have hypercalcemia and liver masses which were biopsied on 12/16/24, biopsy revealed DLBCL. Patient received R mini CHOP on 12/28 now admitted for cycle #2 Rmini CHOP, upon admission patient is febrile with increasing lung opacities for which pulmonary is consulted    ddx here would include PJP given on steroids and immunosuppressed but was not on Bactrim  viral pneumonitis would also be on ddx  the patient is on maximal oxygen therapy via HFNC and the risk of the procedure outweighs the benefit given he is already receiving empiric treatment for PJP, discussed risk/benefit with family and they are in agreement  suggest noninvasive workup to begin, then pending clinical course will further determine role for bronchoscopy  coronavirus+    - send PJP PCR sputum  - sputum culture  - viral serologies and PCR for CMV, EBV, HSV  - repeat full RVP  - broad spectrum abx per ID, empiric PJP coverage reasonable - would consider bumping steroids to 1 mg/kg for PJP  - f/u Fungitell, galactomannan  - add azithro, send urine legionella  - recommend IV diuresis (can start with 20-40mg of IV lasix) and assess response given GGO with pleural effusions; check TTE  - pulm to follow    Please notify pulmonary prior to discharge and email [email or web address] to schedule an appointment; please provide appointment info to patient    Follow up at  [address] [place] [address], [address]  [place] [address]  tel: [phone]  fax: [phone]   70 y/o M PMHx renal transplant 2012 (2/2 DM, s/p left nephrectomy), peripheral neuropathy, hypothyroidism, retroperitoneal fibrosis, HTN, HLD, GERD, anxiety/depression, ANGELIKA and recent admission at [facility] for sacral osteomyelitis and ESBL.coli urosepsis discharged on 12/18/24 to rehab. While admitted to [place] was noted to have hypercalcemia and liver masses which were biopsied on 12/16/24, biopsy revealed DLBCL. Patient received R mini CHOP on 12/28 now admitted for cycle #2 Rmini CHOP, upon admission patient is febrile with increasing lung opacities for which pulmonary is consulted    ddx here would include PJP given on steroids and immunosuppressed but was not on Bactrim  viral pneumonitis would also be on ddx  previously deferred bronchoscopy due to risk for prolonged intubation post-procedure, however now deescalating O2 requirements and subjectively improving   coronavirus+    - send PJP PCR sputum  - sputum culture  - viral serologies and PCR for CMV (neg), EBV, HSV  - repeat full RVP  - broad spectrum abx per ID, empiric PJP coverage reasonable - would consider bumping steroids to 1 mg/kg for PJP  - f/u Fungitell, galactomannan  - urine legionella neg  - check TTE (normal 11/24, and , lower suspicion for hypervolemia)   - if clinically worsens, O2 requirements going up, will reconsider bronchoscopy     D/w Dr. Chan    Please notify pulmonary prior to discharge and email [email or web address] to schedule an appointment; please provide appointment info to patient    Follow up at  [address], [address]  [place] [address]  tel: [phone]  fax: [phone]

## 2025-01-27 NOTE — PROGRESS NOTE ADULT - ASSESSMENT
Patient is a 69 year old male with PMH of renal transplant 2012 (2/2 DM, s/p left nephrectomy), peripheral neuropathy, hypothyroidism, retroperitoneal fibrosis, HTN, HLD, GERD, anxiety/depression, ANGELIKA and recent admission at [facility] for ESBL E.coli urosepsis, imaging with ?sacral OM though pt with no sacral wound suspected findings likely related to mets, he was discharged on 12/18/24 to rehab, recently diagnosed DLBCL while admitted to [place] when he was noted to have hypercalcemia and liver masses s/p biopsy 12/16/24, now s/p R mini CHOP on 12/28 who was admitted 1/17 for cycle #2 Rmini CHOP, upon admission patient febrile and chemotherapy was held for now.  Prior cultures reviewed, history of ESBL Klebsiella in Ucx 12/31/24, ESBL E.coli 11/29/24 Ucx     Fevers, worsening hypoxia, c/f PJP   Viral URI d/t coronavirus (not COVID)  - 1/17 RVP with Coronavirus (229E,HKU1,NL63,OC43) detected   - 1/17 CT Chest with extensive new b/l ground glass nodular opacities, upper lobe dominant - possible pna; small R and trace L effusions  - 1/17 CTAP decreased R hepatic mass since 12/9/24; known splenic mass; changes likely c/w retroperitoneal fibrosis   - UA with some pyuria, no bacteria, Ucx with >3 organisms - suspect contaminated specimen    - repeat Ucx both with low colony count of yeasts/C.albicans, likely contaminant, no need to treat  - LUE PICC line with no sign of infection, no sign of SSTI, no rashes   - wound care eval noted for sacral and ischial DTI, no open wound noted  - MRSA PCR screen negative (+MSSA)  - s/p zosyn 1/17-1/20, escalated to ertapenem 1/20pm due to fevers   - 1/22 CXR mild pulm vasc congestion, small L effusion, no focal consolidation  - 1/17, 1/19, 1/21 Bcx remain NGTD  - 1/23 CTAP with no significant changes, diffuse erosive changes in sacrum with soft tissue infiltration similar to prior, low suspicion for OM given no open wound in area  - 1/23 CT Chest with diffuse b/l patchy ggo with central predominance increased from 1/17/25 - edema vs pneumonia; unchanged LLL round atelectasis; small L pleural effusion   - imaging reviewed, c/f PCP based on repeat CT, ongoing fevers, worsening hypoxia now requiring HFNC, LDH elevated/increased, was on steroids without ppx and iso malignancy, noted vancomycin added overnight, s/p hydrocortisone 1/22-1/23   - IP eval appreciated, risks outweigh benefits, suggest noninvasive work up to begin with role of bronch tbd pending course  1/25 afebrile x 24 hours, ua neg , blood cx neg to date, EBV serology c/w past infection   1/26 fungitell > 500 - c/w suspicion for PJP  1/27 afebrile >48h now, WBC stable, remains on HFNC    s/p zosyn 1/17-1/20  s/p ertapenem 1/20- 1/24  s/p vanc x1 on 1/24  s/p meropenem 1/24-1/26   started bactrim + prednisone 1/24-    Recommendations:   Send sputum for PCP PCR if able to expectorate   Interventional Pulm following   Follow aspergillus ag, CMV PCR, in process   Continue Bactrim 320mg IV Q8h (based on pts weight) - plan for 21d course   Continue Prednisone 40mg PO BID x5d until 1/28 then 40mg daily x5d (1/29-2/2), then 20mg daily for 11 days (2/3-2/13)  Nephrology following, monitor renal function closely    Continue on valtrex ppx  Monitor temps/CBC  Supportive care  Aspiration precautions  Wound care, offloading as able, nutrition  Continue rest of care per primary team     Risa Ac M.D.  [facility]  Available on [employer] TEAMS - *PREFERRED*  [phone]  After 5pm on weekdays and all day on weekends - please call [phone]     Thank you for consulting us and involving us in the management of this patients case. In addition to reviewing history, imaging, documents, labs, microbiology, took into account antibiotic stewardship, local antibiogram and infection control strategies and potential transmission issues.

## 2025-01-27 NOTE — PROGRESS NOTE ADULT - PROBLEM SELECTOR PLAN 12
DVT prophylaxis, Lovenox 40mg s/c daily.  Encourage ambulation.  PT consult.  Code status: full everardo, Dr. Garcia explained to wife (Ludmila) who is the HCP about patient's current health status and prognosis, options for code status was explained. His HCP expressed that she would like Piero to continue all life sustaining treatments therefore patient will remian as FULL CODE.

## 2025-01-28 DIAGNOSIS — J96.01 ACUTE RESPIRATORY FAILURE WITH HYPOXIA: ICD-10-CM

## 2025-01-28 LAB
ADD ON TEST-SPECIMEN IN LAB: SIGNIFICANT CHANGE UP
ALBUMIN SERPL ELPH-MCNC: 2.4 G/DL — LOW (ref 3.3–5)
ALP SERPL-CCNC: 88 U/L — SIGNIFICANT CHANGE UP (ref 40–120)
ALT FLD-CCNC: 31 U/L — SIGNIFICANT CHANGE UP (ref 10–45)
ANION GAP SERPL CALC-SCNC: 11 MMOL/L — SIGNIFICANT CHANGE UP (ref 5–17)
APTT BLD: 27.6 SEC — SIGNIFICANT CHANGE UP (ref 24.5–35.6)
AST SERPL-CCNC: 39 U/L — SIGNIFICANT CHANGE UP (ref 10–40)
BASOPHILS # BLD AUTO: 0 K/UL — SIGNIFICANT CHANGE UP (ref 0–0.2)
BASOPHILS NFR BLD AUTO: 0 % — SIGNIFICANT CHANGE UP (ref 0–2)
BILIRUB SERPL-MCNC: 0.2 MG/DL — SIGNIFICANT CHANGE UP (ref 0.2–1.2)
BUN SERPL-MCNC: 34 MG/DL — HIGH (ref 7–23)
CALCIUM SERPL-MCNC: 9.8 MG/DL — SIGNIFICANT CHANGE UP (ref 8.4–10.5)
CHLORIDE SERPL-SCNC: 99 MMOL/L — SIGNIFICANT CHANGE UP (ref 96–108)
CO2 SERPL-SCNC: 22 MMOL/L — SIGNIFICANT CHANGE UP (ref 22–31)
CREAT SERPL-MCNC: 1.12 MG/DL — SIGNIFICANT CHANGE UP (ref 0.5–1.3)
D DIMER BLD IA.RAPID-MCNC: 1092 NG/ML DDU — HIGH
EGFR: 72 ML/MIN/1.73M2 — SIGNIFICANT CHANGE UP
EOSINOPHIL # BLD AUTO: 0 K/UL — SIGNIFICANT CHANGE UP (ref 0–0.5)
EOSINOPHIL NFR BLD AUTO: 0 % — SIGNIFICANT CHANGE UP (ref 0–6)
FIBRINOGEN PPP-MCNC: 483 MG/DL — HIGH (ref 200–445)
FLUAV AG NPH QL: SIGNIFICANT CHANGE UP
FLUBV AG NPH QL: SIGNIFICANT CHANGE UP
GLUCOSE BLDC GLUCOMTR-MCNC: 170 MG/DL — HIGH (ref 70–99)
GLUCOSE BLDC GLUCOMTR-MCNC: 219 MG/DL — HIGH (ref 70–99)
GLUCOSE BLDC GLUCOMTR-MCNC: 223 MG/DL — HIGH (ref 70–99)
GLUCOSE BLDC GLUCOMTR-MCNC: 226 MG/DL — HIGH (ref 70–99)
GLUCOSE SERPL-MCNC: 203 MG/DL — HIGH (ref 70–99)
HCT VFR BLD CALC: 23.5 % — LOW (ref 39–50)
HGB BLD-MCNC: 7.7 G/DL — LOW (ref 13–17)
INR BLD: 0.96 RATIO — SIGNIFICANT CHANGE UP (ref 0.85–1.16)
LDH SERPL L TO P-CCNC: 517 U/L — HIGH (ref 50–242)
LYMPHOCYTES # BLD AUTO: 0 % — LOW (ref 13–44)
LYMPHOCYTES # BLD AUTO: 0 K/UL — LOW (ref 1–3.3)
MAGNESIUM SERPL-MCNC: 1.8 MG/DL — SIGNIFICANT CHANGE UP (ref 1.6–2.6)
MCHC RBC-ENTMCNC: 29.4 PG — SIGNIFICANT CHANGE UP (ref 27–34)
MCHC RBC-ENTMCNC: 32.8 G/DL — SIGNIFICANT CHANGE UP (ref 32–36)
MCV RBC AUTO: 89.7 FL — SIGNIFICANT CHANGE UP (ref 80–100)
MONOCYTES # BLD AUTO: 0.3 K/UL — SIGNIFICANT CHANGE UP (ref 0–0.9)
MONOCYTES NFR BLD AUTO: 2.8 % — SIGNIFICANT CHANGE UP (ref 2–14)
NEUTROPHILS # BLD AUTO: 10.18 K/UL — HIGH (ref 1.8–7.4)
NEUTROPHILS NFR BLD AUTO: 93.5 % — HIGH (ref 43–77)
NT-PROBNP SERPL-SCNC: 589 PG/ML — HIGH (ref 0–300)
PHOSPHATE SERPL-MCNC: 2 MG/DL — LOW (ref 2.5–4.5)
PLATELET # BLD AUTO: 440 K/UL — HIGH (ref 150–400)
POTASSIUM SERPL-MCNC: 5.8 MMOL/L — HIGH (ref 3.5–5.3)
POTASSIUM SERPL-SCNC: 5.8 MMOL/L — HIGH (ref 3.5–5.3)
PROT SERPL-MCNC: 5.3 G/DL — LOW (ref 6–8.3)
PROTHROM AB SERPL-ACNC: 10.9 SEC — SIGNIFICANT CHANGE UP (ref 9.9–13.4)
RBC # BLD: 2.62 M/UL — LOW (ref 4.2–5.8)
RBC # FLD: 18.9 % — HIGH (ref 10.3–14.5)
RSV RNA NPH QL NAA+NON-PROBE: SIGNIFICANT CHANGE UP
SARS-COV-2 RNA SPEC QL NAA+PROBE: SIGNIFICANT CHANGE UP
SODIUM SERPL-SCNC: 132 MMOL/L — LOW (ref 135–145)
URATE SERPL-MCNC: 4.9 MG/DL — SIGNIFICANT CHANGE UP (ref 3.4–8.8)
WBC # BLD: 10.57 K/UL — HIGH (ref 3.8–10.5)
WBC # FLD AUTO: 10.57 K/UL — HIGH (ref 3.8–10.5)

## 2025-01-28 PROCEDURE — 99232 SBSQ HOSP IP/OBS MODERATE 35: CPT | Mod: GC

## 2025-01-28 PROCEDURE — 99233 SBSQ HOSP IP/OBS HIGH 50: CPT | Mod: FS

## 2025-01-28 PROCEDURE — 99232 SBSQ HOSP IP/OBS MODERATE 35: CPT

## 2025-01-28 RX ORDER — SODIUM ZIRCONIUM CYCLOSILICATE 5 G/5G
10 POWDER, FOR SUSPENSION ORAL ONCE
Refills: 0 | Status: COMPLETED | OUTPATIENT
Start: 2025-01-28 | End: 2025-01-28

## 2025-01-28 RX ORDER — PREDNISONE 5 MG/1
40 TABLET ORAL DAILY
Refills: 0 | Status: COMPLETED | OUTPATIENT
Start: 2025-01-29 | End: 2025-02-02

## 2025-01-28 RX ORDER — SOD PHOSPHATE,MONOBASIC-DIBAS 3MMOL/ML
15 VIAL (ML) INTRAVENOUS ONCE
Refills: 0 | Status: COMPLETED | OUTPATIENT
Start: 2025-01-28 | End: 2025-01-28

## 2025-01-28 RX ORDER — INSULIN GLARGINE-YFGN 100 [IU]/ML
46 INJECTION, SOLUTION SUBCUTANEOUS AT BEDTIME
Refills: 0 | Status: DISCONTINUED | OUTPATIENT
Start: 2025-01-28 | End: 2025-01-29

## 2025-01-28 RX ORDER — INSULIN LISPRO 100/ML
30 VIAL (ML) SUBCUTANEOUS
Refills: 0 | Status: DISCONTINUED | OUTPATIENT
Start: 2025-01-28 | End: 2025-01-29

## 2025-01-28 RX ADMIN — ROSUVASTATIN CALCIUM 10 MILLIGRAM(S): 10 TABLET, FILM COATED ORAL at 22:48

## 2025-01-28 RX ADMIN — Medication 63.75 MILLIMOLE(S): at 09:18

## 2025-01-28 RX ADMIN — SULFAMETHOXAZOLE AND TRIMETHOPRIM 280 MILLIGRAM(S): 400; 80 TABLET ORAL at 05:09

## 2025-01-28 RX ADMIN — Medication 1 DROP(S): at 05:08

## 2025-01-28 RX ADMIN — Medication 50 MILLILITER(S): at 05:10

## 2025-01-28 RX ADMIN — Medication 2 TABLET(S): at 22:47

## 2025-01-28 RX ADMIN — Medication 4: at 09:15

## 2025-01-28 RX ADMIN — Medication 1 DROP(S): at 17:54

## 2025-01-28 RX ADMIN — VALACYCLOVIR 500 MILLIGRAM(S): 1000 TABLET ORAL at 17:54

## 2025-01-28 RX ADMIN — CLONIDINE HYDROCHLORIDE 0.1 MILLIGRAM(S): 0.2 TABLET ORAL at 22:51

## 2025-01-28 RX ADMIN — INSULIN GLARGINE-YFGN 46 UNIT(S): 100 INJECTION, SOLUTION SUBCUTANEOUS at 22:46

## 2025-01-28 RX ADMIN — Medication 10 MILLIGRAM(S): at 05:09

## 2025-01-28 RX ADMIN — PREDNISONE 40 MILLIGRAM(S): 5 TABLET ORAL at 05:08

## 2025-01-28 RX ADMIN — PREDNISONE 40 MILLIGRAM(S): 5 TABLET ORAL at 17:53

## 2025-01-28 RX ADMIN — SODIUM ZIRCONIUM CYCLOSILICATE 10 GRAM(S): 5 POWDER, FOR SUSPENSION ORAL at 15:14

## 2025-01-28 RX ADMIN — Medication 26 UNIT(S): at 13:26

## 2025-01-28 RX ADMIN — LEVOTHYROXINE SODIUM 88 MICROGRAM(S): 25 TABLET ORAL at 05:09

## 2025-01-28 RX ADMIN — Medication 100 MILLIGRAM(S): at 13:27

## 2025-01-28 RX ADMIN — ANTISEPTIC SURGICAL SCRUB 1 APPLICATION(S): 0.04 SOLUTION TOPICAL at 09:20

## 2025-01-28 RX ADMIN — BUPROPION HYDROCHLORIDE 300 MILLIGRAM(S): 150 TABLET, EXTENDED RELEASE ORAL at 12:58

## 2025-01-28 RX ADMIN — Medication 1 DROP(S): at 12:59

## 2025-01-28 RX ADMIN — SULFAMETHOXAZOLE AND TRIMETHOPRIM 280 MILLIGRAM(S): 400; 80 TABLET ORAL at 13:58

## 2025-01-28 RX ADMIN — Medication 1 DROP(S): at 00:07

## 2025-01-28 RX ADMIN — CLONIDINE HYDROCHLORIDE 0.1 MILLIGRAM(S): 0.2 TABLET ORAL at 13:25

## 2025-01-28 RX ADMIN — Medication 12.5 MILLIGRAM(S): at 05:09

## 2025-01-28 RX ADMIN — SULFAMETHOXAZOLE AND TRIMETHOPRIM 280 MILLIGRAM(S): 400; 80 TABLET ORAL at 22:47

## 2025-01-28 RX ADMIN — Medication 4: at 17:50

## 2025-01-28 RX ADMIN — CLONIDINE HYDROCHLORIDE 0.1 MILLIGRAM(S): 0.2 TABLET ORAL at 05:09

## 2025-01-28 RX ADMIN — Medication 4: at 13:25

## 2025-01-28 RX ADMIN — PANTOPRAZOLE 40 MILLIGRAM(S): 20 TABLET, DELAYED RELEASE ORAL at 12:58

## 2025-01-28 RX ADMIN — Medication 100 MILLIGRAM(S): at 22:51

## 2025-01-28 RX ADMIN — Medication 30 UNIT(S): at 17:52

## 2025-01-28 RX ADMIN — VALACYCLOVIR 500 MILLIGRAM(S): 1000 TABLET ORAL at 05:09

## 2025-01-28 RX ADMIN — ESCITALOPRAM 10 MILLIGRAM(S): 10 TABLET, FILM COATED ORAL at 12:58

## 2025-01-28 RX ADMIN — POLYETHYLENE GLYCOL 3350 17 GRAM(S): 17 POWDER, FOR SOLUTION ORAL at 22:49

## 2025-01-28 RX ADMIN — Medication 50 MILLILITER(S): at 17:50

## 2025-01-28 RX ADMIN — Medication 26 UNIT(S): at 09:16

## 2025-01-28 RX ADMIN — Medication 12.5 MILLIGRAM(S): at 17:54

## 2025-01-28 RX ADMIN — ENOXAPARIN SODIUM 40 MILLIGRAM(S): 100 INJECTION SUBCUTANEOUS at 22:47

## 2025-01-28 RX ADMIN — Medication 15 GRAM(S): at 22:48

## 2025-01-28 RX ADMIN — Medication 100 MILLIGRAM(S): at 05:09

## 2025-01-28 NOTE — PROGRESS NOTE ADULT - PROBLEM SELECTOR PLAN 3
1/26 - Hyperglycemia, changed sliding scale coverage, endocrine consulted.  1/26 - NPO until BG, moderate-dose correction insulin q4h until glucose is under 250, IV fluids as tolerated.   Recommend Lantus 36 units QHS   Once glucose normalized ,recommend restarting diet and recommend Admelog 18 units TID before meals (HOLD if NPO or not eating). Taper downwards preemptively if steroids are being reduced to avoid hypoglycemia.   Recommend moderate dose ADMELOG correction scale TIDQAC and separate moderate dose scale QHS  Please check FSG before meals and QHS, or q6h while NPO  Inpatient glucose goals: <140 pre-meal, <180 random.  consistent carb diet when eating  Monitor FS AC/HS  Sliding scale Humalog AC/HS.  1/24 hypoglycemic to FS 20s off steroids-->adding steroids for PJP PNA, will also lower lantus 38-->24U QHS and continue 8U premeal admelog Course currently c/b steroid induced hyperglycemia     -Endocrinology following, recs appreciated  -C/w Lantus 46 units QHS, Admelog 30units TID before meals (HOLD if NPO or not eating). Taper down preemptively if steroids are being reduced to avoid hypoglycemia.   - Recommend moderate dose admelog correction scale TIDQAC and separate moderate dose scale QHS  - POCT glucose before meals and QHS, or q6h while NPO  - Inpatient glucose goals: <140 pre-meal, <180 random  - consistent carb diet

## 2025-01-28 NOTE — PROGRESS NOTE ADULT - PROBLEM SELECTOR PLAN 1
Admitted  for R mini CHOP, found to be febrile on admission hold  chemo   Monitor CBC with diff, transfuse as needed.  Monitor electrolytes, replete as needed.  Daily weights.  Strict I/O. mouth care  Plans for any chemo or tafasitamab/lenalidomide are now ON HOLD given severe infections and fevers.  1/25 stable on HFNC. cont to hold planned therapy due to suspected PCP/PJC pneumonia  1/27 stable on HFNC, O2 requirements less, now 50% FiO2 and 50 LPM. No further plan for chemo and/or immunotherapy at present. Request made to transfer to medicine floor with Hospitalist coverage - accepted by Dr. Doug Sanchez when re-located to another unit. Trasplant ID consulted at recommendation by Transplant Nephro. Admitted  for R mini CHOP, found to be febrile on admission hold  chemo   Monitor CBC with diff, transfuse as needed.  Monitor electrolytes, replete as needed.  Daily weights.Strict I/O. mouth care  Plans for any chemo or tafasitamab/lenalidomide are now ON HOLD given severe infections and fevers.  1/25 stable on HFNC. cont to hold planned therapy due to suspected PCP/PJC pneumonia  1/27 stable on HFNC, O2 requirements less, now 50% FiO2 and 50 LPM. No further plan for chemo and/or immunotherapy at present. Request made to transfer to medicine floor with Hospitalist coverage - accepted by  1/28 Medicine service Doug Sanchez will take over care when pt moved to [address] [place] [address].   Trasplant ID consulted at recommendation by Transplant Nephro.

## 2025-01-28 NOTE — PROGRESS NOTE ADULT - ASSESSMENT
70 y/o M PMHx renal transplant 2012 (2/2 DM, s/p left nephrectomy), peripheral neuropathy, hypothyroidism, retroperitoneal fibrosis, HTN, HLD, GERD, anxiety/depression, ANGELIKA and recent admission at [facility] for sacral osteomyelitis and ESBL.coli urosepsis discharged on 12/18/24 to rehab. While admitted to [facility] was noted to have hypercalcemia and liver masses which were biopsied on 12/16/24, biopsy revealed DLBCL. Patient received R mini CHOP on 12/28 now admitted for cycle #2 R-mini CHOP, upon admission patient is febrile and Chemotherapy is on HOLD. Pt has been pancytopenic secondary to chemotherapy as well as infection. Currently undergoing workup or suspected PCP/PJC pneumonia 70 y/o M PMHx renal transplant 2012 (2/2 DM, s/p left nephrectomy), peripheral neuropathy, hypothyroidism, retroperitoneal fibrosis, HTN, HLD, GERD, anxiety/depression, ANGELIKA and recent admission at [facility] for sacral osteomyelitis and ESBL.coli urosepsis discharged on 12/18/24 to rehab. While admitted to [facility] was noted to have hypercalcemia and liver masses which were biopsied on 12/16/24, biopsy revealed DLBCL. Patient received R mini CHOP on 12/28 now admitted for cycle #2 R-mini CHOP, upon admission patient is febrile and chemotherapy held, course c/b AHRF and c/f PJP pneumonia

## 2025-01-28 NOTE — PROGRESS NOTE ADULT - ASSESSMENT
70 y/o M PMHx renal transplant 2012 (2/2 DM, s/p left nephrectomy), peripheral neuropathy, hypothyroidism, retroperitoneal fibrosis, HTN, HLD, GERD, anxiety/depression, ANGELIKA and recent admission at [facility] for sacral osteomyelitis and ESBL.coli urosepsis discharged on 12/18/24 to rehab. While admitted to [place] was noted to have hypercalcemia and liver masses which were biopsied on 12/16/24, biopsy revealed DLBCL. Patient received R mini CHOP on 12/28 now admitted for cycle #2 Rmini CHOP, upon admission patient is febrile with increasing lung opacities for which pulmonary is consulted.     ddx here would include PJP given on steroids and immunosuppressed but was not on Bactrim  viral pneumonitis would also be on ddx  previously deferred bronchoscopy due to risk for prolonged intubation post-procedure, however now deescalating O2 requirements and subjectively improving   coronavirus+    - send PJP PCR sputum and sputum culture if able  - viral serologies and PCR for CMV (neg), f/u EBV, HSV  - repeat full RVP   - broad spectrum abx per ID, empiric PJP coverage reasonable; c/w steroids and bactrim  - f/u Fungitell, galactomannan  - urine legionella neg  - check TTE (normal 11/24, and , lower suspicion for hypervolemia)   - if clinically worsens, O2 requirements going up, will reconsider bronchoscopy   - now on 40/40 HFNC --> wean to NC if tolerated    D/w Dr. Chan    Please notify pulmonary prior to discharge and email [email or web address] to schedule an appointment; please provide appointment info to patient    Follow up at  [address] [place] [address], [address]  [place] [address]  tel: [phone]  fax: [phone]

## 2025-01-28 NOTE — PROGRESS NOTE ADULT - NUTRITIONAL ASSESSMENT
This patient has been assessed with a concern for Malnutrition and has been determined to have a diagnosis/diagnoses of Moderate protein-calorie malnutrition.    This patient is being managed with:   Diet Consistent Carbohydrate/No Snacks-  Supplement Feeding Modality:  Oral  Glucerna Shake Cans or Servings Per Day:  1       Frequency:  Daily  Entered: Jan 26 2025 11:01AM

## 2025-01-28 NOTE — PROGRESS NOTE ADULT - ASSESSMENT
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   Endocrinology consulted for hyperglycemia.  Patient currently on HFNC, chemotherapy on hold due to suspected PNA per notes. Continues on prednisone 40mg twice daily.     #Uncontrolled Type 2 Diabetes Mellitus with  #Steroid-induced hyperglycemia  - Follows with: Dr. Romero  - A1C with Estimated Average Glucose Result: 7.8 % (12-29-24)  - home regimen: Came from rehab, there he was on Lantus 38 units, Admelog 10 units with correction scale  - eGFR: 70 mL/min/1.73m2 (01-26-25)  - glucose grossly elevated, no evidence of DKA on labs      INPATIENT PLAN:  - Increase Lantus to 46 units QHS   - Increase Admelog 30units TID before meals (HOLD if NPO or not eating). Taper downwards preemptively if steroids are being reduced to avoid hypoglycemia.   - Recommend moderate dose admelog correction scale TIDQAC and separate moderate dose scale QHS  - Please check FSG before meals and QHS, or q6h while NPO  - Inpatient glucose goals: <140 pre-meal, <180 random  - consistent carb diet when eating      DISCHARGE PLANNING:  - Discharge recs pending clinical course and depending on steroids. Will need basal/bolus.   - will need Endocrinology follow up routinely with his endocrinologist Dr. Romero.     #Hypothyroidism  Home regimen: Levothyroxine 88 mcg daily  12/31/24 TSH 0.55 FT4 1.2  Continue levothyroxine 88 mcg daily    #Hypertension  - Goal BP <130/80  - on hydralazine, clonidine, coreg  - Management as per primary team  - check urine albumin level as outpatient    #Hyperlipidemia  - LDL goal <70  - on rosuvastatin 10 mg daily  - check lipid panel as outpatient on a yearly basis      Contact via [employer] Teams during business hours  To reach covering provider access AL via sun[employer] tools  For Urgent matters/after-hours/weekends/holidays please page endocrine fellow on call   For nonurgent matters please email [email or web address]    Please note that this patient may be followed by different provider tomorrow.  Notify endocrine 24 hours prior to discharge for final recommendations

## 2025-01-28 NOTE — PROGRESS NOTE ADULT - PROBLEM SELECTOR PLAN 2
Not neutropenic. afebrile   1/17- F/u Flu/COVID PCR , + for Coronavirus   (last hospitalization: osteomyelitis and E coli urosepsis (12/1/24 - 12/18/24)   [facility] (12/18/24): for osteomyelitis & E coli urosepsis).  1/18 - Oral candidiasis - Nystatin S/S  1/19- ID consult, followed by [facility] ID, Zosyn changed to Ertapenem  history of ESBL Kleb in Ucx 12/31/24, ESBL E.coli 11/29/24 Ucx  1/24 - CT chest with c/f new PCP pneumonia as well as unchanged sacral OM. ID recs DC IV vanc, switch IV erta to IV mick given hypoalbuminemia efficacy concerns and start IV bactrim for empiric PCP PNA treatment as well as steroid taper pred 40mg BID x5 days followed by pred 40mg QD x5d then 20mg QD.   1/24 fungitell high > 500; pending galactoman, beta D glucan  1/26- D/[place] Meropenem, not neutropenic  1/27 cont IV Bactrim for PCP PNA, valtrex ppx 1/17 Admitted + for Coronavirus, 1/24 - CT chest with c/f new PCP pneumonia, recs DC IV vanc, switch IV erta to IV mick given hypoalbuminemia efficacy concerns and start IV bactrim for empiric PCP PNA treatment as well as steroid taper pred 40mg BID x5 days followed by pred 40mg QD x5d then 20mg QD.   1/24 fungitell high > 500; pending galactomannan, beta D glucan, Initially on HFNC now weaned to 6 LNC    Plan:  -wean O2 as tolerated and monitor on pulse ox  -f/u sputum culture and sputum for PCP PCR if able to expectorate   -f/u aspergillus Ag, in process   -Bactrim 320mg IV Q8h (based on pts weight) - plan for 21d course   -c/w Prednisone 40mg PO BID x5d until 1/28 then 40mg daily x5d (1/29-2/2), then 20mg daily for 11 days (2/3-2/13)  -Continue on valtrex ppx  - Monitor temps/CBC

## 2025-01-28 NOTE — PROGRESS NOTE ADULT - SUBJECTIVE AND OBJECTIVE BOX
Diagnosis: PTLD    Protocol/Chemo Regimen: Plan for Tafa/Revlimid on HOLD    Pt endorsed: calm today    Review of Systems:  Denies pain    Pain scale:  unable to rate    Diet:  NPO    Allergies: No Known Allergies    ANTIMICROBIALS  trimethoprim / sulfamethoxazole IVPB 320 milliGRAM(s) IV Intermittent every 8 hours  valACYclovir 500 milliGRAM(s) Oral every 12 hours      HEME/ONC MEDICATIONS  enoxaparin Injectable 40 milliGRAM(s) SubCutaneous <User Schedule>      STANDING MEDICATIONS  amLODIPine   Tablet 10 milliGRAM(s) Oral daily  artificial tears (preservative free) Ophthalmic Solution 1 Drop(s) Both EYES every 6 hours  buPROPion XL (24-Hour) . 300 milliGRAM(s) Oral daily  carvedilol 12.5 milliGRAM(s) Oral every 12 hours  chlorhexidine 4% Liquid 1 Application(s) Topical <User Schedule>  cloNIDine 0.1 milliGRAM(s) Oral three times a day  dextrose 5%. 1000 milliLiter(s) IV Continuous <Continuous>  dextrose 5%. 1000 milliLiter(s) IV Continuous <Continuous>  dextrose 50% Injectable 25 Gram(s) IV Push once  escitalopram 10 milliGRAM(s) Oral daily  glucagon  Injectable 1 milliGRAM(s) IntraMuscular once  hydrALAZINE 100 milliGRAM(s) Oral every 8 hours  insulin glargine Injectable (LANTUS) 42 Unit(s) SubCutaneous at bedtime  insulin lispro (ADMELOG) corrective regimen sliding scale   SubCutaneous three times a day before meals  insulin lispro (ADMELOG) corrective regimen sliding scale   SubCutaneous at bedtime  insulin lispro Injectable (ADMELOG) 26 Unit(s) SubCutaneous three times a day before meals  lactulose Syrup 15 Gram(s) Oral <User Schedule>  levothyroxine 88 MICROGram(s) Oral daily  pantoprazole   Suspension 40 milliGRAM(s) Oral daily  polyethylene glycol 3350 17 Gram(s) Oral at bedtime  predniSONE   Tablet 40 milliGRAM(s) Oral two times a day  rosuvastatin 10 milliGRAM(s) Oral at bedtime  senna 2 Tablet(s) Oral at bedtime  sodium chloride 0.9%. 1000 milliLiter(s) IV Continuous <Continuous>      PRN MEDICATIONS  acetaminophen     Tablet .. 650 milliGRAM(s) Oral every 6 hours PRN  dextrose Oral Gel 15 Gram(s) Oral once PRN  polyethylene glycol 3350 17 Gram(s) Oral daily PRN  sodium chloride 0.9% lock flush 10 milliLiter(s) IV Push every 1 hour PRN        Vital Signs Last 24 Hrs  T(C): 36.7 (28 Jan 2025 05:14), Max: 36.7 (27 Jan 2025 13:20)  T(F): 98 (28 Jan 2025 05:14), Max: 98.1 (27 Jan 2025 21:19)  HR: 65 (28 Jan 2025 05:14) (63 - 81)  BP: 149/67 (28 Jan 2025 05:14) (127/71 - 169/77)  BP(mean): --  RR: 17 (28 Jan 2025 05:14) (17 - 20)  SpO2: 94% (28 Jan 2025 05:14) (94% - 98%)    Parameters below as of 28 Jan 2025 05:14  Patient On (Oxygen Delivery Method): nasal cannula, high flow  O2 Flow (L/min): 50  O2 Concentration (%): 50      PHYSICAL EXAM  General: adult in NAD  HEENT: clear oropharynx, anicteric sclera  Neck: supple  CV: normal S1/S2 RRR  Lungs: positive air movement on HFNC  Abdomen: soft non-tender non-distended, (+) BS  Ext: no edema  Skin: no rashes and no petechiae  Neuro: alert and oriented X 3, no focal deficits  Central Line: PICC c/d/i      RECENT CULTURES:  01-24 @ 00:46  .Blood BLOOD  --  --  --    No growth at 4 days  --  01-24 @ 00:18  .Blood BLOOD  --  --  --    No growth at 4 days  --  01-22 @ 05:05  Clean Catch Clean Catch (Midstream)  --  --  --    10,000 - 49,000 CFU/mL Candida albicans  "Susceptibilities not performed"  --  01-21 @ 22:25  .Blood BLOOD  --  --  --    No growth at 5 days  --  01-21 @ 22:07  .Blood BLOOD  --  --  --    No growth at 5 days  --        LABS:                        7.8    8.17  )-----------( 478      ( 27 Jan 2025 06:58 )             24.0         Mean Cell Volume : 88.6 fl  Mean Cell Hemoglobin : 28.8 pg  Mean Cell Hemoglobin Concentration : 32.5 g/dL  Auto Neutrophil # : 7.66 K/uL  Auto Lymphocyte # : 0.11 K/uL  Auto Monocyte # : 0.24 K/uL  Auto Eosinophil # : 0.00 K/uL  Auto Basophil # : 0.01 K/uL  Auto Neutrophil % : 93.9 %  Auto Lymphocyte % : 1.3 %  Auto Monocyte % : 2.9 %  Auto Eosinophil % : 0.0 %  Auto Basophil % : 0.1 %      01-27    127[L]  |  94[L]  |  35[H]  ----------------------------<  301[H]  5.3   |  22  |  0.87    Ca    9.5      27 Jan 2025 07:00  Phos  2.8     01-27  Mg     1.8     01-27    TPro  5.3[L]  /  Alb  2.4[L]  /  TBili  0.2  /  DBili  x   /  AST  37  /  ALT  36  /  AlkPhos  89  01-27        RADIOLOGY & ADDITIONAL STUDIES:    < from: Xray Chest 1 View- PORTABLE-Urgent (Xray Chest 1 View- PORTABLE-Urgent .) (01.27.25 @ 10:04) >    IMPRESSION:  Mild pulmonary vascular congestion appears similar.    --- End of Report ---    < end of copied text >       Diagnosis: PTLD    Protocol/Chemo Regimen: Plan for Tafa/Revlimid on HOLD    Pt endorsed: on high flow;  no complaints.     Review of Systems:  Denies pain    Pain scale:  unable to rate    Diet:  NPO    Allergies: No Known Allergies    ANTIMICROBIALS  trimethoprim / sulfamethoxazole IVPB 320 milliGRAM(s) IV Intermittent every 8 hours  valACYclovir 500 milliGRAM(s) Oral every 12 hours      HEME/ONC MEDICATIONS  enoxaparin Injectable 40 milliGRAM(s) SubCutaneous <User Schedule>      STANDING MEDICATIONS  amLODIPine   Tablet 10 milliGRAM(s) Oral daily  artificial tears (preservative free) Ophthalmic Solution 1 Drop(s) Both EYES every 6 hours  buPROPion XL (24-Hour) . 300 milliGRAM(s) Oral daily  carvedilol 12.5 milliGRAM(s) Oral every 12 hours  chlorhexidine 4% Liquid 1 Application(s) Topical <User Schedule>  cloNIDine 0.1 milliGRAM(s) Oral three times a day  dextrose 5%. 1000 milliLiter(s) IV Continuous <Continuous>  dextrose 5%. 1000 milliLiter(s) IV Continuous <Continuous>  dextrose 50% Injectable 25 Gram(s) IV Push once  escitalopram 10 milliGRAM(s) Oral daily  glucagon  Injectable 1 milliGRAM(s) IntraMuscular once  hydrALAZINE 100 milliGRAM(s) Oral every 8 hours  insulin glargine Injectable (LANTUS) 42 Unit(s) SubCutaneous at bedtime  insulin lispro (ADMELOG) corrective regimen sliding scale   SubCutaneous three times a day before meals  insulin lispro (ADMELOG) corrective regimen sliding scale   SubCutaneous at bedtime  insulin lispro Injectable (ADMELOG) 26 Unit(s) SubCutaneous three times a day before meals  lactulose Syrup 15 Gram(s) Oral <User Schedule>  levothyroxine 88 MICROGram(s) Oral daily  pantoprazole   Suspension 40 milliGRAM(s) Oral daily  polyethylene glycol 3350 17 Gram(s) Oral at bedtime  predniSONE   Tablet 40 milliGRAM(s) Oral two times a day  rosuvastatin 10 milliGRAM(s) Oral at bedtime  senna 2 Tablet(s) Oral at bedtime  sodium chloride 0.9%. 1000 milliLiter(s) IV Continuous <Continuous>      PRN MEDICATIONS  acetaminophen     Tablet .. 650 milliGRAM(s) Oral every 6 hours PRN  dextrose Oral Gel 15 Gram(s) Oral once PRN  polyethylene glycol 3350 17 Gram(s) Oral daily PRN  sodium chloride 0.9% lock flush 10 milliLiter(s) IV Push every 1 hour PRN        Vital Signs Last 24 Hrs  T(C): 36.7 (28 Jan 2025 05:14), Max: 36.7 (27 Jan 2025 13:20)  T(F): 98 (28 Jan 2025 05:14), Max: 98.1 (27 Jan 2025 21:19)  HR: 65 (28 Jan 2025 05:14) (63 - 81)  BP: 149/67 (28 Jan 2025 05:14) (127/71 - 169/77)  BP(mean): --  RR: 17 (28 Jan 2025 05:14) (17 - 20)  SpO2: 94% (28 Jan 2025 05:14) (94% - 98%)    Parameters below as of 28 Jan 2025 05:14  Patient On (Oxygen Delivery Method): nasal cannula, high flow  O2 Flow (L/min): 50  O2 Concentration (%): 50      PHYSICAL EXAM  General: adult in NAD  HEENT: clear oropharynx, anicteric sclera  Neck: supple  CV: normal S1/S2 RRR  Lungs: positive air movement on HFNC  Abdomen: soft non-tender non-distended, (+) BS  Ext: no edema  Skin: no rashes and no petechiae  Neuro: alert and oriented X 2  Central Line: PICC c/d/i      RECENT CULTURES:  01-24 @ 00:46  .Blood BLOOD  --  --  --    No growth at 4 days  --  01-24 @ 00:18  .Blood BLOOD  --  --  --    No growth at 4 days  --  01-22 @ 05:05  Clean Catch Clean Catch (Midstream)  --  --  --    10,000 - 49,000 CFU/mL Candida albicans  "Susceptibilities not performed"  --  01-21 @ 22:25  .Blood BLOOD  --  --  --    No growth at 5 days  --  01-21 @ 22:07  .Blood BLOOD  --  --  --    No growth at 5 days  --      LABS:                            7.7    10.57 )-----------( 440      ( 28 Jan 2025 07:36 )             23.5         Mean Cell Volume : 89.7 fl  Mean Cell Hemoglobin : 29.4 pg  Mean Cell Hemoglobin Concentration : 32.8 g/dL  Auto Neutrophil # : 10.18 K/uL  Auto Lymphocyte # : 0.00 K/uL  Auto Monocyte # : 0.30 K/uL  Auto Eosinophil # : 0.00 K/uL  Auto Basophil # : 0.00 K/uL  Auto Neutrophil % : 93.5 %  Auto Lymphocyte % : 0.0 %  Auto Monocyte % : 2.8 %  Auto Eosinophil % : 0.0 %  Auto Basophil % : 0.0 %      01-28    132[L]  |  99  |  34[H]  ----------------------------<  203[H]  5.8[H]   |  22  |  1.12    Ca    9.8      28 Jan 2025 07:34  Phos  2.0     01-28  Mg     1.8     01-28    TPro  5.3[L]  /  Alb  2.4[L]  /  TBili  0.2  /  DBili  x   /  AST  39  /  ALT  31  /  AlkPhos  88  01-28      PT/INR - ( 28 Jan 2025 07:38 )   PT: 10.9 sec;   INR: 0.96 ratio         PTT - ( 28 Jan 2025 07:38 )  PTT:27.6 sec      Uric Acid 4.9      RADIOLOGY & ADDITIONAL STUDIES:    < from: Xray Chest 1 View- PORTABLE-Urgent (Xray Chest 1 View- PORTABLE-Urgent .) (01.27.25 @ 10:04) >    IMPRESSION:  Mild pulmonary vascular congestion appears similar.    --- End of Report ---    < end of copied text >

## 2025-01-28 NOTE — PROGRESS NOTE ADULT - NS ATTEND AMEND GEN_ALL_CORE FT
Primary: Jose    Assessment: Piero was a scheduled admission from [facility] in [facility] (1/17/24) for day 1 cycle 2 mini RCH(O)P for PTLD but complicated by PCP (b-D glucan: +) with high O2 demands. Chemo currently on HOLD.    PMHx::  1) renal transplant 2012: left nephrectomy; renal transplant was secondary to DM, 2) AODM, 3) HLD, 4) hypothyroidism, 5) peripheral neuropathy  6) retroperitoneal fibrosis, 7) GERD, 8) HTN, 9) anxiety/depression, 10) obstructive sleep apnea, 11) osteomyelitis and E coli urosepsis (12/1/24 - 12/18/24)    Plan:  Heme:  Hold planned chemotherapy. given toxic presentation and sustained high fevers unlikely to continue anthracycline-based treatment.   Approved for Tafa/REV but given PCP Dx hold this therapy temporarily      ID:   prednisone 40mg bid [1/24/25 - ]  bactrim 320mg q8 [1/24/25 - ]: follow renal function with high dose bactrim  Valtrex    ID History  zosyn (1/17/25 - 1/20/25)  ertapenum (1/20/25 - 1/24/25)  mick (1/24/25 - 1/26/25)    Radiographs: Last chest/abd pelvis: 1/17/25, CT Chest/A/P (1/23/25): concerning for PCP, Unchanged retroperitoneal soft tissue encasing the aorta and IVC. Diffuse erosive changes of the sacrum with soft tissue infiltration,  similar in appearance to prior, which could represent osteomyelitis.  CXR [1/27/25]: Mild pulmonary vascular congestion appears similar.    Renal: continue ; please consult renal; as he is receiving high-dose steroids Cystatin C will be unreliable to eGFR.  Concerned that bactrim may be inducing renal dysfunction.     Endo: steroid induced hyperglycemia -- adjust insulin    PM&R consulted: for inability to weight bare before respiratory distress.    DVT prophylaxis: LWHx ppx    [facility]  Wife: Ludmila, daughter: Luz Marina and son: Jose J. Confirmed with his wife, Ludmila, that Piero remains full code at this time.  Piero has become encephalopathic with his metabolic disturbances.  Previous conversations (during the past weeks) were always full code. Primary: Jose    Assessment: Piero was a scheduled admission from [facility] in [facility] (1/17/24) for day 1 cycle 2 mini RCH(O)P for PTLD but complicated by PCP (b-D glucan: +) with high O2 demands. Chemo currently on HOLD, did not receive cycle 2.    PMHx::  1) renal transplant 2012: left nephrectomy; renal transplant was secondary to DM, 2) AODM, 3) HLD, 4) hypothyroidism, 5) peripheral neuropathy  6) retroperitoneal fibrosis, 7) GERD, 8) HTN, 9) anxiety/depression, 10) obstructive sleep apnea, 11) osteomyelitis and E coli urosepsis (12/1/24 - 12/18/24)    Plan:  Heme:  Hold planned chemotherapy. given toxic presentation and sustained high fevers unlikely to continue anthracycline-based treatment.   Approved for Tafa/REV but given PCP Dx hold this therapy temporarily and consider starting with respiratory improvement     ID:   prednisone 40mg bid [1/24/25 - ]  bactrim 320mg q8 [1/24/25 - ]: follow renal function with high dose bactrim  Valtrex    ID History  zosyn (1/17/25 - 1/20/25)  ertapenum (1/20/25 - 1/24/25)  mick (1/24/25 - 1/26/25)    Radiographs: Last chest/abd pelvis: 1/17/25, CT Chest/A/P (1/23/25): concerning for PCP, Unchanged retroperitoneal soft tissue encasing the aorta and IVC. Diffuse erosive changes of the sacrum with soft tissue infiltration,  similar in appearance to prior, which could represent osteomyelitis.  CXR [1/27/25]: Mild pulmonary vascular congestion appears similar.    Renal: Continue to monitor eGFR, off tacro due to PCP pna    Endo: steroid induced hyperglycemia -- adjust insulin    PM&R consulted: for inability to weight bare before respiratory distress.    DVT prophylaxis: LWHx ppx    [facility]  Wife: Ludmila, daughter: Luz Marina and son: Jose J. Confirmed with his wife, Ludmila, that Piero remains full code at this time.  Piero has become encephalopathic with his metabolic disturbances.  Previous conversations (during the past weeks) were always full code.    Transfer to medicine team, Heme team or [address] [place] [address] team to consult.

## 2025-01-28 NOTE — PROGRESS NOTE ADULT - SUBJECTIVE AND OBJECTIVE BOX
[facility]  WANG Mccoy Y. Patel, S. Shah, G. Doug  [phone]  ([phone] - weekdays after 5pm and weekends)    Name: JAN CALVIN  Age/Gender: 69y Male  MRN: [id number]    Interval History:  Patient seen and examined this morning.   Resting on HFNC, denies fever, cough or any pain.   No new complaints noted.  Notes reviewed  No concerning overnight events  Afebrile   Allergies: No Known Allergies      Objective:  Vitals:   T(F): 98 (01-28-25 @ 05:14), Max: 98.1 (01-27-25 @ 21:19)  HR: 65 (01-28-25 @ 05:14) (63 - 81)  BP: 149/67 (01-28-25 @ 05:14) (127/71 - 169/77)  RR: 17 (01-28-25 @ 05:14) (17 - 20)  SpO2: 94% (01-28-25 @ 05:14) (94% - 98%)  Physical Examination:  General: no acute distress, HFNC  HEENT: NC/AT, anicteric, EOMI  Respiratory: decreased breath sounds b/l   Cardiovascular: S1 and S2 present, normal rate   Gastrointestinal: soft, nontender, nondistended  Extremities: no edema, no cyanosis  Skin: no visible rash    Laboratory Studies:  CBC:                       7.7    10.57 )-----------( 440      ( 28 Jan 2025 07:36 )             23.5     WBC Trend:  10.57 01-28-25 @ 07:36  8.17 01-27-25 @ 06:58  11.69 01-26-25 @ 06:53  8.38 01-25-25 @ 06:37  10.35 01-24-25 @ 07:30  7.93 01-23-25 @ 06:57  7.67 01-22-25 @ 06:52  10.76 01-21-25 @ 18:48    CMP: 01-27    127[L]  |  94[L]  |  35[H]  ----------------------------<  301[H]  5.3   |  22  |  0.87    Ca    9.5      27 Jan 2025 07:00  Phos  2.8     01-27  Mg     1.8     01-27    TPro  5.3[L]  /  Alb  2.4[L]  /  TBili  0.2  /  DBili  x   /  AST  37  /  ALT  36  /  AlkPhos  89  01-27    Creatinine: 0.87 mg/dL (01-27-25 @ 07:00)  Creatinine: 1.01 mg/dL (01-26-25 @ 17:42)  Creatinine: 1.14 mg/dL (01-26-25 @ 08:11)  Creatinine: 1.17 mg/dL (01-26-25 @ 06:54)  Creatinine: 1.05 mg/dL (01-25-25 @ 06:37)  Creatinine: 0.93 mg/dL (01-24-25 @ 07:23)  Creatinine: 0.84 mg/dL (01-23-25 @ 07:00)  Creatinine: 0.82 mg/dL (01-22-25 @ 06:53)  Creatinine: 1.04 mg/dL (01-21-25 @ 18:48)    LIVER FUNCTIONS - ( 27 Jan 2025 07:00 )  Alb: 2.4 g/dL / Pro: 5.3 g/dL / ALK PHOS: 89 U/L / ALT: 36 U/L / AST: 37 U/L / GGT: x           Microbiology: reviewed   Culture - Blood (collected 01-24-25 @ 00:46)  Source: .Blood BLOOD  Preliminary Report (01-28-25 @ 04:01):    No growth at 4 days    Culture - Blood (collected 01-24-25 @ 00:18)  Source: .Blood BLOOD  Preliminary Report (01-28-25 @ 04:01):    No growth at 4 days    Culture - Urine (collected 01-22-25 @ 05:05)  Source: Clean Catch Clean Catch (Midstream)  Final Report (01-24-25 @ 13:31):    10,000 - 49,000 CFU/mL Candida albicans    "Susceptibilities not performed"    Culture - Blood (collected 01-21-25 @ 22:25)  Source: .Blood BLOOD  Final Report (01-27-25 @ 03:00):    No growth at 5 days    Culture - Blood (collected 01-21-25 @ 22:07)  Source: .Blood BLOOD  Final Report (01-27-25 @ 03:00):    No growth at 5 days    Culture - Blood (collected 01-19-25 @ 18:36)  Source: .Blood BLOOD  Final Report (01-24-25 @ 23:00):    No growth at 5 days    Culture - Urine (collected 01-19-25 @ 18:36)  Source: Clean Catch Clean Catch (Midstream)  Final Report (01-20-25 @ 21:26):    10,000 - 49,000 CFU/mL Candida albicans    "Susceptibilities not performed"    Culture - Urine (collected 01-17-25 @ 17:09)  Source: Clean Catch Clean Catch (Midstream)  Final Report (01-18-25 @ 20:03):    >=3 organisms. Probable collection contamination.    Culture - Blood (collected 01-17-25 @ 11:38)  Source: .Blood BLOOD  Final Report (01-22-25 @ 15:00):    No growth at 5 days    Radiology: reviewed     Medications:  acetaminophen     Tablet .. 650 milliGRAM(s) Oral every 6 hours PRN  amLODIPine   Tablet 10 milliGRAM(s) Oral daily  artificial tears (preservative free) Ophthalmic Solution 1 Drop(s) Both EYES every 6 hours  buPROPion XL (24-Hour) . 300 milliGRAM(s) Oral daily  carvedilol 12.5 milliGRAM(s) Oral every 12 hours  chlorhexidine 4% Liquid 1 Application(s) Topical <User Schedule>  cloNIDine 0.1 milliGRAM(s) Oral three times a day  dextrose 5%. 1000 milliLiter(s) IV Continuous <Continuous>  dextrose 5%. 1000 milliLiter(s) IV Continuous <Continuous>  dextrose 50% Injectable 25 Gram(s) IV Push once  dextrose Oral Gel 15 Gram(s) Oral once PRN  enoxaparin Injectable 40 milliGRAM(s) SubCutaneous <User Schedule>  escitalopram 10 milliGRAM(s) Oral daily  glucagon  Injectable 1 milliGRAM(s) IntraMuscular once  hydrALAZINE 100 milliGRAM(s) Oral every 8 hours  insulin glargine Injectable (LANTUS) 42 Unit(s) SubCutaneous at bedtime  insulin lispro (ADMELOG) corrective regimen sliding scale   SubCutaneous three times a day before meals  insulin lispro (ADMELOG) corrective regimen sliding scale   SubCutaneous at bedtime  insulin lispro Injectable (ADMELOG) 26 Unit(s) SubCutaneous three times a day before meals  lactulose Syrup 15 Gram(s) Oral <User Schedule>  levothyroxine 88 MICROGram(s) Oral daily  pantoprazole   Suspension 40 milliGRAM(s) Oral daily  polyethylene glycol 3350 17 Gram(s) Oral at bedtime  polyethylene glycol 3350 17 Gram(s) Oral daily PRN  predniSONE   Tablet 40 milliGRAM(s) Oral two times a day  rosuvastatin 10 milliGRAM(s) Oral at bedtime  senna 2 Tablet(s) Oral at bedtime  sodium chloride 0.9% lock flush 10 milliLiter(s) IV Push every 1 hour PRN  sodium chloride 0.9%. 1000 milliLiter(s) IV Continuous <Continuous>  trimethoprim / sulfamethoxazole IVPB 320 milliGRAM(s) IV Intermittent every 8 hours  valACYclovir 500 milliGRAM(s) Oral every 12 hours    Current Antimicrobials:  trimethoprim / sulfamethoxazole IVPB 320 milliGRAM(s) IV Intermittent every 8 hours  valACYclovir 500 milliGRAM(s) Oral every 12 hours    Prior/Completed Antimicrobials:  ertapenem  IVPB  piperacillin/tazobactam IVPB.  piperacillin/tazobactam IVPB.-  vancomycin  IVPB

## 2025-01-28 NOTE — PROGRESS NOTE ADULT - PROBLEM SELECTOR PLAN 11
1/26- agitated this afternoon, appears irritable wants to remove Hiflo nasal canula, Haldol 0.5 mg was given, contacted family (wife - Ludmila), maintained calm for 2 hrs while wife and daughter was present.  ~ 5 Pm agitated again does not want  to continue treatment, attempt to calm patient down have been met with much resistance, therefore Haldol 1mg IV given, will monitor closely.

## 2025-01-28 NOTE — PROGRESS NOTE ADULT - SUBJECTIVE AND OBJECTIVE BOX
PULMONARY PROGRESS NOTE    PATIENT INFORMATION:  NAME: JAN CALVIN:  MRN: [id number]    CHIEF COMPLAINT: Patient is a 69y old  Male who presents with a chief complaint of "For chemo" (28 Jan 2025 08:07)      [x] INITIAL CONSULT, H&P, FAMILY HISTORY and PAST MEDICAL AND SURGICAL HISTORY REVIEWED    OVERNIGHT EVENTS, CHANGES TO HPI, SUBJECTIVE:   - Patient seen and examined at bedside  - No overnight events  - Symptoms stable/improving  Weaned to HFNC 40/40    ========================REVIEW OF SYSTEMS========================  [x] ROS negative except as per HPI    ========================MEDICATIONS=============================  NEURO  buPROPion XL (24-Hour) . 300 milliGRAM(s) Oral daily  escitalopram 10 milliGRAM(s) Oral daily    CARDIO  amLODIPine   Tablet 10 milliGRAM(s) Oral daily  carvedilol 12.5 milliGRAM(s) Oral every 12 hours  cloNIDine 0.1 milliGRAM(s) Oral three times a day  hydrALAZINE 100 milliGRAM(s) Oral every 8 hours    PULM    FEN/GI  dextrose 5%. 1000 milliLiter(s) IV Continuous <Continuous>  dextrose 5%. 1000 milliLiter(s) IV Continuous <Continuous>  lactulose Syrup 15 Gram(s) Oral <User Schedule>  pantoprazole   Suspension 40 milliGRAM(s) Oral daily  polyethylene glycol 3350 17 Gram(s) Oral at bedtime  senna 2 Tablet(s) Oral at bedtime  sodium chloride 0.9%. 1000 milliLiter(s) IV Continuous <Continuous>    HEME/ONC  enoxaparin Injectable 40 milliGRAM(s) SubCutaneous <User Schedule>    ANTIMICROBIALS  trimethoprim / sulfamethoxazole IVPB 320 milliGRAM(s) IV Intermittent every 8 hours  valACYclovir 500 milliGRAM(s) Oral every 12 hours    ENDO  dextrose 50% Injectable 25 Gram(s) IV Push once  glucagon  Injectable 1 milliGRAM(s) IntraMuscular once  insulin glargine Injectable (LANTUS) 42 Unit(s) SubCutaneous at bedtime  insulin lispro (ADMELOG) corrective regimen sliding scale   SubCutaneous three times a day before meals  insulin lispro (ADMELOG) corrective regimen sliding scale   SubCutaneous at bedtime  insulin lispro Injectable (ADMELOG) 26 Unit(s) SubCutaneous three times a day before meals  levothyroxine 88 MICROGram(s) Oral daily  predniSONE   Tablet 40 milliGRAM(s) Oral two times a day  rosuvastatin 10 milliGRAM(s) Oral at bedtime    OTHER  artificial tears (preservative free) Ophthalmic Solution 1 Drop(s) Both EYES every 6 hours  chlorhexidine 4% Liquid 1 Application(s) Topical <User Schedule>      PRN      Drips      ========================PHYSICAL EXAM============================    VITALS: Vital Signs Last 24 Hrs  T(C): 37.2 (28 Jan 2025 09:49), Max: 37.2 (28 Jan 2025 09:49)  T(F): 99 (28 Jan 2025 09:49), Max: 99 (28 Jan 2025 09:49)  HR: 68 (28 Jan 2025 09:49) (65 - 81)  BP: 124/76 (28 Jan 2025 09:49) (124/76 - 169/77)  BP(mean): --  RR: 18 (28 Jan 2025 09:49) (17 - 20)  SpO2: 94% (28 Jan 2025 09:49) (94% - 98%)    Parameters below as of 28 Jan 2025 09:49  Patient On (Oxygen Delivery Method): nasal cannula  O2 Flow (L/min): 50  O2 Concentration (%): 50    INTAKE and OUTPUT: I&O's Detail    27 Jan 2025 07:01  -  28 Jan 2025 07:00  --------------------------------------------------------  IN:    IV PiggyBack: 420 mL    IV PiggyBack: 800 mL    Oral Fluid: 840 mL    sodium chloride 0.9%: 1419 mL  Total IN: 3479 mL    OUT:    Voided (mL): 2750 mL  Total OUT: 2750 mL    Total NET: 729 mL      28 Jan 2025 07:01  -  28 Jan 2025 12:08  --------------------------------------------------------  IN:    Oral Fluid: 240 mL  Total IN: 240 mL    OUT:    Voided (mL): 1500 mL  Total OUT: 1500 mL    Total NET: -1260 mL          VENTILATOR SETTINGS:     Physical Exam  General: WN/WD NAD  HEENT: PERRL, EOMI, moist mucous membranes  Neurology: A&Ox3, nonfocal, GARDNER x 4  Respiratory: crackles  CV: RRR, S1S2, no murmurs, rubs or gallops  Abdominal: Soft, NT, ND +BS  Extremities: No edema, + peripheral pulses        ======================LABORATORY RESULTS AND IMAGING=============                        7.7    10.57 )-----------( 440      ( 28 Jan 2025 07:36 )             23.5                                  01-28    132[L]  |  99  |  34[H]  ----------------------------<  203[H]  5.8[H]   |  22  |  1.12    Ca    9.8      28 Jan 2025 07:34  Phos  2.0     01-28  Mg     1.8     01-28    TPro  5.3[L]  /  Alb  2.4[L]  /  TBili  0.2  /  DBili  x   /  AST  39  /  ALT  31  /  AlkPhos  88  01-28    N:10.18/L:0.00= __ 01-28-25 @ 07:36  N:7.66/L:0.11= __ 01-27-25 @ 06:58  N:11.12/L:0.09= __ 01-26-25 @ 06:53  N:8.08/L:0.08= __ 01-25-25 @ 06:37  N:9.29/L:0.19= __ 01-24-25 @ 07:30    Ref-range: <3 normal, >9 elevated, >18 very elevated    Procalcitonin: 0.52 ng/mL (01-20-25 @ 06:57)        ABG Trend  02-03-20 @ 22:15 FiO2--  - 7.43/27/78/20/96 Lactate --  10-15-16 @ 10:51 [id number]  - 7.42/33/76/22/95 Lactate --    VBG Trend  01-18-25 @ 02:40 FiO2--  - 7.48/34/186/25/98.8 Lactate 1.3  12-28-24 @ 18:46 FiO2--  - 7.41/45/52/28/82.1 Lactate --  02-04-20 @ 16:02 FiO2--  - 7.44/29/104/22/97 Lactate --      Creatinine Trend: 1.12<--, 0.87<--, 1.01<--, 1.14<--, 1.17<--, 1.05<--    [x] RADIOLOGY REVIEWED AND INTERPRETED BY ME

## 2025-01-28 NOTE — PROGRESS NOTE ADULT - PROBLEM SELECTOR PLAN 5
AT RISK FOR ADRENAL INSUFFIENCEY IF HYPOTENSIVE 50mg hydrocortisone IV q8  s/p Hydrocortisone 25 mg IV, now off--on pred taper for PJP PNA  1/26 D/C Tacrolimus and Lisinopril due to DIMITRIOS and PCP.   Transplant nephro following- recomm Transplant ID to consult

## 2025-01-28 NOTE — PROGRESS NOTE ADULT - SUBJECTIVE AND OBJECTIVE BOX
69yM was admitted on 01-17    In ED, GCS=    Patient is a 69y old  Male who presents with a chief complaint of "For chemo" (28 Jan 2025 12:08)    HPI:  M 70 y/o M PMHx renal transplant 2012 (2/2 DM, s/p left nephrectomy), peripheral neuropathy, hypothyroidism, retroperitoneal fibrosis, HTN, HLD, GERD, anxiety/depression, ANGELIKA and recent admission at [facility] for sacral osteomyelitis and ESBL.coli urosepsis discharged on 12/18/24 to rehab. While admitted to [facility] was noted to have hypercalcemia and liver masses which were biopsied on 12/16/24, biopsy revealed DLBCL. Patient received R mini CHOP on 12/28 now admitted for cycle #2 Rmini CHOP, upon admission patient is febrile will hold chemotherapy today.  (17 Jan 2025 12:09)      Imaging reviewed showed:  1/23/25 CT C/A/P:     BONES: Diffuse erosive changes of the sacrum with soft tissue   infiltration, similar in appearance to prior. Sclerotic focus in the left   anterolateral seventh rib. Chronic rib fractures. Chronic left humeral   fracture. Left hip replacement.    IMPRESSION:    Diffuse bilateral patchy groundglass opacities with central predominance,   increased from 1/17/2025, may represent edema or pneumonia.  Unchanged left lower lobe round atelectasis.  Small left pleural effusion.  Unchanged retroperitoneal soft tissue encasing the aorta and IVC.  Diffuse erosive changes of the sacrum with soft tissue infiltration,   similar in appearance to prior, which could represent osteomyelitis.    1/23/25 Bilateral Hip X-ray:  Postoperative Changes: Cemented left hip hemiarthroplasty is again   seen. Slight lucency is again seen at the bone cement interface of the   medial intertrochanteric region. There is mild adjacent heterotopic   ossification.    Joint Space(s):  Right hip and sacroiliac joints appear maintained.    OTHER FINDINGS:  Severe atherosclerotic calcifications are present.   Pelvic surgical clips are.    IMPRESSION:  1.  No acute osseous abnormalities.  2.  Sacral insufficiency fractures are better seen on CT.    REVIEW OF SYSTEMS  Constitutional - No fever, No weight loss, No fatigue  HEENT - No eye pain, No visual disturbances, No difficulty hearing, No tinnitus, No vertigo, No neck pain  Respiratory - No cough, No wheezing, No shortness of breath  Cardiovascular - No chest pain, No palpitations  Gastrointestinal - No abdominal pain, No nausea, No vomiting, No diarrhea, No constipation  Genitourinary - No dysuria, No frequency, No hematuria, No incontinence  Neurological - No headaches, No memory loss, No loss of strength, No numbness, No tremors  Endocrine - No temperature intolerance  Musculoskeletal - No joint pain, No joint swelling, No muscle pain  Psychiatric - No depression, No anxiety    VITALS  T(C): 37.2 (01-28-25 @ 09:49), Max: 37.2 (01-28-25 @ 09:49)  HR: 72 (01-28-25 @ 13:01) (65 - 81)  BP: 124/76 (01-28-25 @ 09:49) (124/76 - 169/77)  RR: 18 (01-28-25 @ 09:49) (17 - 20)  SpO2: 93% (01-28-25 @ 13:01) (93% - 98%)    PAST MEDICAL & SURGICAL HISTORY  Diabetes Mellitus Type II  ESRD on Dialysis  GERD (gastroesophageal reflux disease)  Depression  Hypothyroidism  Hyperlipidemia  Kidney transplanted  Hypertension  ANGELIKA (obstructive sleep apnea)  Peripheral neuropathy  Shoulder fracture  Hypothyroidism  History of renal transplant  DLBCL (diffuse large B cell lymphoma)  S/P kidney transplant  S/P cholecystectomy  Retroperitoneal fibrosis  AV fistula  S/P right cataract extraction  S/P left cataract extraction  H/O unilateral nephrectomy    SOCIAL HISTORY - as per documentation/history    FUNCTIONAL HISTORY    CURRENT FUNCTIONAL STATUS      FAMILY HISTORY   MI (myocardial infarction)  Family history of obesity (Sibling)    RECENT LABS - Reviewed  CBC Full  -  ( 28 Jan 2025 07:36 )  WBC Count : 10.57 K/uL  RBC Count : 2.62 M/uL  Hemoglobin : 7.7 g/dL  Hematocrit : 23.5 %  Platelet Count - Automated : 440 K/uL  Mean Cell Volume : 89.7 fl  Mean Cell Hemoglobin : 29.4 pg  Mean Cell Hemoglobin Concentration : 32.8 g/dL  Auto Neutrophil # : 10.18 K/uL  Auto Lymphocyte # : 0.00 K/uL  Auto Monocyte # : 0.30 K/uL  Auto Eosinophil # : 0.00 K/uL  Auto Basophil # : 0.00 K/uL  Auto Neutrophil % : 93.5 %  Auto Lymphocyte % : 0.0 %  Auto Monocyte % : 2.8 %  Auto Eosinophil % : 0.0 %  Auto Basophil % : 0.0 %    01-28    132[L]  |  99  |  34[H]  ----------------------------<  203[H]  5.8[H]   |  22  |  1.12    Ca    9.8      28 Jan 2025 07:34  Phos  2.0     01-28  Mg     1.8     01-28    TPro  5.3[L]  /  Alb  2.4[L]  /  TBili  0.2  /  DBili  x   /  AST  39  /  ALT  31  /  AlkPhos  88  01-28    Urinalysis Basic - ( 28 Jan 2025 07:34 )    Color: x / Appearance: x / SG: x / pH: x  Gluc: 203 mg/dL / Ketone: x  / Bili: x / Urobili: x   Blood: x / Protein: x / Nitrite: x   Leuk Esterase: x / RBC: x / WBC x   Sq Epi: x / Non Sq Epi: x / Bacteria: x    ALLERGIES  No Known Allergies    MEDICATIONS   acetaminophen     Tablet .. 650 milliGRAM(s) Oral every 6 hours PRN  amLODIPine   Tablet 10 milliGRAM(s) Oral daily  artificial tears (preservative free) Ophthalmic Solution 1 Drop(s) Both EYES every 6 hours  buPROPion XL (24-Hour) . 300 milliGRAM(s) Oral daily  carvedilol 12.5 milliGRAM(s) Oral every 12 hours  chlorhexidine 4% Liquid 1 Application(s) Topical <User Schedule>  cloNIDine 0.1 milliGRAM(s) Oral three times a day  dextrose 5%. 1000 milliLiter(s) IV Continuous <Continuous>  dextrose 5%. 1000 milliLiter(s) IV Continuous <Continuous>  dextrose 50% Injectable 25 Gram(s) IV Push once  dextrose Oral Gel 15 Gram(s) Oral once PRN  enoxaparin Injectable 40 milliGRAM(s) SubCutaneous <User Schedule>  escitalopram 10 milliGRAM(s) Oral daily  glucagon  Injectable 1 milliGRAM(s) IntraMuscular once  hydrALAZINE 100 milliGRAM(s) Oral every 8 hours  insulin glargine Injectable (LANTUS) 42 Unit(s) SubCutaneous at bedtime  insulin lispro (ADMELOG) corrective regimen sliding scale   SubCutaneous three times a day before meals  insulin lispro (ADMELOG) corrective regimen sliding scale   SubCutaneous at bedtime  insulin lispro Injectable (ADMELOG) 26 Unit(s) SubCutaneous three times a day before meals  lactulose Syrup 15 Gram(s) Oral <User Schedule>  levothyroxine 88 MICROGram(s) Oral daily  pantoprazole   Suspension 40 milliGRAM(s) Oral daily  polyethylene glycol 3350 17 Gram(s) Oral at bedtime  polyethylene glycol 3350 17 Gram(s) Oral daily PRN  predniSONE   Tablet 40 milliGRAM(s) Oral two times a day  rosuvastatin 10 milliGRAM(s) Oral at bedtime  senna 2 Tablet(s) Oral at bedtime  sodium chloride 0.9% lock flush 10 milliLiter(s) IV Push every 1 hour PRN  sodium chloride 0.9%. 1000 milliLiter(s) IV Continuous <Continuous>  trimethoprim / sulfamethoxazole IVPB 320 milliGRAM(s) IV Intermittent every 8 hours  valACYclovir 500 milliGRAM(s) Oral every 12 hours  ----------------------------------------------------------------------------------------  PHYSICAL EXAM  Constitutional - NAD, Comfortable  HEENT - NCAT, EOMI  Neck - Supple, No limited ROM  Chest - Breathing comfortably, No wheezing  Cardiovascular - S1S2   Abdomen - Soft   Extremities - No C/C/E, No calf tenderness   Neurologic Exam -                    Cognitive - Awake, Alert, AAO to self, place, date, year, situation     Communication - Fluent, No dysarthria     Cranial Nerves - CN 2-12 intact     Motor - No focal deficits                    LEFT    UE - ShAB 5/5, EF 5/5, EE 5/5, WE 5/5,  5/5                    RIGHT UE - ShAB 5/5, EF 5/5, EE 5/5, WE 5/5,  5/5                    LEFT    LE - HF 5/5, KE 5/5, DF 5/5, PF 5/5                    RIGHT LE - HF 5/5, KE 5/5, DF 5/5, PF 5/5        Sensory - Intact to LT     Reflexes - DTR Intact, No primitive reflexive     Coordination - FTN intact     OculoVestibular - No saccades, No nystagmus, VOR         Balance - WNL Static  Psychiatric - Mood stable, Affect WNL  ----------------------------------------------------------------------------------------  ASSESSMENT/PLAN  69yMale with functional deficits after  Pain - Tylenol  DVT PPX - SCDs  Rehab -    Recommend ACUTE inpatient rehabilitation for the functional deficits consisting of 3 hours of therapy/day & 24 hour RN/daily PMR physician for comorbid medical management. Patient will be able to tolerate 3 hours a day.   Recommend MILO, patient DOES NOT meet acute inpatient rehabilitation criteria. Patient needs a more prolonged stay to achieve transition to community.    Expect patient to achieve functional goals for DC HOME with OUTPATIENT   Expect patient to achieve functional goals for DC HOME with HOME CARE   Follow up with CONCUSSION PROGRAM - Call [phone] for an appointment    Will continue to follow. Functional progress will determine ongoing rehab dispo recommendations, which may change.    Continue bedside therapy as well as OOB throughout the day with mobilization by staff to maintain cardiopulmonary function and prevention of secondary complications related to debility.  Patient is a 69y old  Male who was admitted for chemotherapy but then had his treatment held due to fevers and found to have urosepsis with Klebsiella ESBL (Zosyn sensitive) and nasal corona virus.    HPI:  68 y/o M PMHx renal transplant 2012 (2/2 DM, s/p left nephrectomy), peripheral neuropathy, hypothyroidism, retroperitoneal fibrosis, HTN, HLD, GERD, anxiety/depression, ANGELIKA and recent admission at [facility] for sacral osteomyelitis and ESBL.coli urosepsis discharged on 12/18/24 to rehab. While admitted to [place] was noted to have hypercalcemia and liver masses which were biopsied on 12/16/24, biopsy revealed DLBCL. Patient received R mini CHOP on 12/28 now admitted for cycle #2 Rmini CHOP, upon admission patient was febrile so chemotherapy was held. He was found to have urosepsis with Klebsiella ESBL (Zosyn sensitive), nasal corona virus and concern for PCP. Plan by primary team includes discussing treatment with Tafa/Rev since his current rehab facility will not allow for transport to outpatient oncology to treat his lymphoma.     He was seen by PT on 1/24/25 and patient declined therapy    He was approved for Tafa/REV but given PCP Dx, his therapy is on hold. He is on IV bactrim which was started on 1/24/25 with a plan for a 21day course.     Imaging reviewed showed:  1/23/25 CT C/A/P:     BONES: Diffuse erosive changes of the sacrum with soft tissue   infiltration, similar in appearance to prior. Sclerotic focus in the left   anterolateral seventh rib. Chronic rib fractures. Chronic left humeral   fracture. Left hip replacement.    IMPRESSION:    Diffuse bilateral patchy groundglass opacities with central predominance,   increased from 1/17/2025, may represent edema or pneumonia.  Unchanged left lower lobe round atelectasis.  Small left pleural effusion.  Unchanged retroperitoneal soft tissue encasing the aorta and IVC.  Diffuse erosive changes of the sacrum with soft tissue infiltration,   similar in appearance to prior, which could represent osteomyelitis.    1/23/25 Bilateral Hip X-ray:  Postoperative Changes: Cemented left hip hemiarthroplasty is again   seen. Slight lucency is again seen at the bone cement interface of the   medial intertrochanteric region. There is mild adjacent heterotopic   ossification.    Joint Space(s):  Right hip and sacroiliac joints appear maintained.    OTHER FINDINGS:  Severe atherosclerotic calcifications are present.   Pelvic surgical clips are.    IMPRESSION:  1.  No acute osseous abnormalities.  2.  Sacral insufficiency fractures are better seen on CT.    REVIEW OF SYSTEMS: Negative unless stated in the HPI    VITALS  T(C): 37.2 (01-28-25 @ 09:49), Max: 37.2 (01-28-25 @ 09:49)  HR: 72 (01-28-25 @ 13:01) (65 - 81)  BP: 124/76 (01-28-25 @ 09:49) (124/76 - 169/77)  RR: 18 (01-28-25 @ 09:49) (17 - 20)  SpO2: 93% (01-28-25 @ 13:01) (93% - 98%)    PAST MEDICAL & SURGICAL HISTORY  Diabetes Mellitus Type II  ESRD on Dialysis  GERD (gastroesophageal reflux disease)  Depression  Hypothyroidism  Hyperlipidemia  Kidney transplanted  Hypertension  ANGELIKA (obstructive sleep apnea)  Peripheral neuropathy  Shoulder fracture  Hypothyroidism  History of renal transplant  DLBCL (diffuse large B cell lymphoma)  S/P kidney transplant  S/P cholecystectomy  Retroperitoneal fibrosis  AV fistula  S/P right cataract extraction  S/P left cataract extraction  H/O unilateral nephrectomy    SOCIAL HISTORY - as per documentation/history    FUNCTIONAL HISTORY:  He lives in home with his wife who has MS. He said she is unable to take care of him. His son and daughter live nearby but they work and unable to watch him. He says they stay on the first floor and there are no stairs to enter. He has a wheelchair and a walker at home. The wheelchair can not fit into the bathroom. He is unsure of when he last walked. He says he moved minimally out of his bed when in the other hospital and in rehab. He needs assistance with ADLs.    CURRENT FUNCTIONAL STATUS: He currently needs assistance with ADLs and mobility. Patient was not able to participate with PT with their evaluation.     FAMILY HISTORY   MI (myocardial infarction)  Family history of obesity (Sibling)    RECENT LABS - Reviewed  CBC Full  -  ( 28 Jan 2025 07:36 )  WBC Count : 10.57 K/uL  RBC Count : 2.62 M/uL  Hemoglobin : 7.7 g/dL  Hematocrit : 23.5 %  Platelet Count - Automated : 440 K/uL  Mean Cell Volume : 89.7 fl  Mean Cell Hemoglobin : 29.4 pg  Mean Cell Hemoglobin Concentration : 32.8 g/dL  Auto Neutrophil # : 10.18 K/uL  Auto Lymphocyte # : 0.00 K/uL  Auto Monocyte # : 0.30 K/uL  Auto Eosinophil # : 0.00 K/uL  Auto Basophil # : 0.00 K/uL  Auto Neutrophil % : 93.5 %  Auto Lymphocyte % : 0.0 %  Auto Monocyte % : 2.8 %  Auto Eosinophil % : 0.0 %  Auto Basophil % : 0.0 %    01-28    132[L]  |  99  |  34[H]  ----------------------------<  203[H]  5.8[H]   |  22  |  1.12    Ca    9.8      28 Jan 2025 07:34  Phos  2.0     01-28  Mg     1.8     01-28    TPro  5.3[L]  /  Alb  2.4[L]  /  TBili  0.2  /  DBili  x   /  AST  39  /  ALT  31  /  AlkPhos  88  01-28    Urinalysis Basic - ( 28 Jan 2025 07:34 )    Color: x / Appearance: x / SG: x / pH: x  Gluc: 203 mg/dL / Ketone: x  / Bili: x / Urobili: x   Blood: x / Protein: x / Nitrite: x   Leuk Esterase: x / RBC: x / WBC x   Sq Epi: x / Non Sq Epi: x / Bacteria: x    ALLERGIES  No Known Allergies    MEDICATIONS   acetaminophen     Tablet .. 650 milliGRAM(s) Oral every 6 hours PRN  amLODIPine   Tablet 10 milliGRAM(s) Oral daily  artificial tears (preservative free) Ophthalmic Solution 1 Drop(s) Both EYES every 6 hours  buPROPion XL (24-Hour) . 300 milliGRAM(s) Oral daily  carvedilol 12.5 milliGRAM(s) Oral every 12 hours  chlorhexidine 4% Liquid 1 Application(s) Topical <User Schedule>  cloNIDine 0.1 milliGRAM(s) Oral three times a day  dextrose 5%. 1000 milliLiter(s) IV Continuous <Continuous>  dextrose 5%. 1000 milliLiter(s) IV Continuous <Continuous>  dextrose 50% Injectable 25 Gram(s) IV Push once  dextrose Oral Gel 15 Gram(s) Oral once PRN  enoxaparin Injectable 40 milliGRAM(s) SubCutaneous <User Schedule>  escitalopram 10 milliGRAM(s) Oral daily  glucagon  Injectable 1 milliGRAM(s) IntraMuscular once  hydrALAZINE 100 milliGRAM(s) Oral every 8 hours  insulin glargine Injectable (LANTUS) 42 Unit(s) SubCutaneous at bedtime  insulin lispro (ADMELOG) corrective regimen sliding scale   SubCutaneous three times a day before meals  insulin lispro (ADMELOG) corrective regimen sliding scale   SubCutaneous at bedtime  insulin lispro Injectable (ADMELOG) 26 Unit(s) SubCutaneous three times a day before meals  lactulose Syrup 15 Gram(s) Oral <User Schedule>  levothyroxine 88 MICROGram(s) Oral daily  pantoprazole   Suspension 40 milliGRAM(s) Oral daily  polyethylene glycol 3350 17 Gram(s) Oral at bedtime  polyethylene glycol 3350 17 Gram(s) Oral daily PRN  predniSONE   Tablet 40 milliGRAM(s) Oral two times a day  rosuvastatin 10 milliGRAM(s) Oral at bedtime  senna 2 Tablet(s) Oral at bedtime  sodium chloride 0.9% lock flush 10 milliLiter(s) IV Push every 1 hour PRN  sodium chloride 0.9%. 1000 milliLiter(s) IV Continuous <Continuous>  trimethoprim / sulfamethoxazole IVPB 320 milliGRAM(s) IV Intermittent every 8 hours  valACYclovir 500 milliGRAM(s) Oral every 12 hours  ----------------------------------------------------------------------------------------  PHYSICAL EXAM  Constitutional - Frustrated, Comfortable  HEENT - NCAT  Chest - Breathing comfortably, No wheezing  Abdomen - Soft   Extremities - No calf tenderness, Able to move bilateral UE and LE greater than antigravity in the bed.   Neurologic Exam -                    Cognitive - Awake, Alert     Communication - Fluent     Sensory - Decreased to light touch in bilateral LE and UE especially in the feet  Psychiatric - Mood stable but frustrated and upset  ----------------------------------------------------------------------------------------  ASSESSMENT/PLAN  69yMale with functional deficits after urosepsis with Klebsiella ESBL (Zosyn sensitive) and nasal corona virus.     #Bilateral LE pain (Left worse than Right)  -He has pain when he bears weight especially on the LLE. He has history of prior falls and prior left hip fracture s/p hip arthroplasty. He also has history of DLBCL. 1/23/25 Bilateral Hip X-ray showed no acute findings.  -Recommend medications for pain management. Could consider standing Tylenol but primary team would need to monitor liver enzymes since his liver enzymes are mildly elevated. Lymphoma can cause bone pain which can be managed with steroids, NSAIDs and opioids. He is already on predniSONE Tablet 40 milliGRAM(s) Oral two times a day. I do not recommend NSAIDs due to his anemia and eventual plan to resume cancer-related treatment. Could consider starting with oxycodone 5 mg q6hr PRN. Monitor for constipation if the patient gets started on oxycodone. He is currently on a bowel regimen. Recommend giving patient pain medication prior to PT to increase success of tolerance of therapy.    #Chemotherapy-induced peripheral neuropathy  -This can be painful for some patient and could also be contributing to his pain. Could consider Gabapentin 300 mg TID. Could also consider topical medications such as lidocaine or capsaicin cream PRN as long as he has no open wounds or skin infections.     #Difficulty with mobility and ADLs  -Patient is on enoxaparin for DVT ppx  -This is multifactorial and could be secondary to being deconditioned after minimal mobility during hospitalization and pain due to his lymphoma or cancer-related treatment  -Recommend medications for pain management as listed above listed under the Bilateral LE pain   -If no improvement or slow improvement then could consider an EMG/NCS outpatient  -Patient has anemia and this will need to be monitored. If his hemoglobin drops below 7 then he should not participate in therapy.  -Continue bedside therapy as well as OOB throughout the day with mobilization by staff to maintain cardiopulmonary function and prevention of secondary complications related to debility.   -Patient is not an acute rehab candidate as they are not able to tolerate 3 hours of therapy per day since he is currently not participating in therapy. Patient's wife can not take care of him at home and his kids can not always be there since they work. Patient is not interested in a nursing home. We would also need to know what the home plan would be after discharge from rehab. Due to his prior history of multiple falls, he may not be safe at home by himself. He also doesn't want to return to the prior rehab facility because he said he was treated badly.   -Will continue to follow. Functional progress will determine ongoing rehab dispo recommendations, which may change.    I communicated with ___________, one of the providers for the primary team, about my assessment and recommendations verbally in-person.     Spent a total of ____ minutes including preparing for the consult, obtaining history, performing physical exam, documenting and communicating with co-providers. Patient is a 69y old  Male who was admitted for chemotherapy but then had his treatment held due to fevers and found to have urosepsis with Klebsiella ESBL (Zosyn sensitive) and nasal corona virus.    HPI:  70 y/o M PMHx renal transplant 2012 (2/2 DM, s/p left nephrectomy), peripheral neuropathy, hypothyroidism, retroperitoneal fibrosis, HTN, HLD, GERD, anxiety/depression, ANGELIKA and recent admission at [facility] for sacral osteomyelitis and ESBL.coli urosepsis discharged on 12/18/24 to rehab. While admitted to [facility] was noted to have hypercalcemia and liver masses which were biopsied on 12/16/24, biopsy revealed DLBCL. Patient received R mini CHOP on 12/28 now admitted for cycle #2 Rmini CHOP, upon admission patient was febrile so chemotherapy was held. He was found to have urosepsis with Klebsiella ESBL (Zosyn sensitive), nasal corona virus and concern for PCP. Plan by primary team includes discussing treatment with Tafa/Rev since his current rehab facility will not allow for transport to outpatient oncology to treat his lymphoma.     He was seen by PT on 1/24/25 and patient declined therapy    He was approved for Tafa/REV but given PCP Dx, his therapy is on hold. He is on IV bactrim which was started on 1/24/25 with a plan for a 21day course.     I also spoke to his wife who was also in the room with the patient. Patient is motivated to try and participate more in therapy today. His wife is also interested in therapy upon discharge from the hospital. His wife states that the patient participated with therapy more today.     He currently denies any pain while in bed but endorses discomfort when ambulating.     Imaging reviewed showed:  1/23/25 CT C/A/P:     BONES: Diffuse erosive changes of the sacrum with soft tissue   infiltration, similar in appearance to prior. Sclerotic focus in the left   anterolateral seventh rib. Chronic rib fractures. Chronic left humeral   fracture. Left hip replacement.    IMPRESSION:    Diffuse bilateral patchy groundglass opacities with central predominance,   increased from 1/17/2025, may represent edema or pneumonia.  Unchanged left lower lobe round atelectasis.  Small left pleural effusion.  Unchanged retroperitoneal soft tissue encasing the aorta and IVC.  Diffuse erosive changes of the sacrum with soft tissue infiltration,   similar in appearance to prior, which could represent osteomyelitis.    1/23/25 Bilateral Hip X-ray:  Postoperative Changes: Cemented left hip hemiarthroplasty is again   seen. Slight lucency is again seen at the bone cement interface of the   medial intertrochanteric region. There is mild adjacent heterotopic   ossification.    Joint Space(s):  Right hip and sacroiliac joints appear maintained.    OTHER FINDINGS:  Severe atherosclerotic calcifications are present.   Pelvic surgical clips are.    IMPRESSION:  1.  No acute osseous abnormalities.  2.  Sacral insufficiency fractures are better seen on CT.    REVIEW OF SYSTEMS: Negative unless stated in the HPI    VITALS  T(C): 37.2 (01-28-25 @ 09:49), Max: 37.2 (01-28-25 @ 09:49)  HR: 72 (01-28-25 @ 13:01) (65 - 81)  BP: 124/76 (01-28-25 @ 09:49) (124/76 - 169/77)  RR: 18 (01-28-25 @ 09:49) (17 - 20)  SpO2: 93% (01-28-25 @ 13:01) (93% - 98%)    PAST MEDICAL & SURGICAL HISTORY  Diabetes Mellitus Type II  ESRD on Dialysis  GERD (gastroesophageal reflux disease)  Depression  Hypothyroidism  Hyperlipidemia  Kidney transplanted  Hypertension  ANGELIKA (obstructive sleep apnea)  Peripheral neuropathy  Shoulder fracture  Hypothyroidism  History of renal transplant  DLBCL (diffuse large B cell lymphoma)  S/P kidney transplant  S/P cholecystectomy  Retroperitoneal fibrosis  AV fistula  S/P right cataract extraction  S/P left cataract extraction  H/O unilateral nephrectomy    SOCIAL HISTORY - as per documentation/history    FUNCTIONAL HISTORY:  He lives in home with his wife who has MS. He said she is unable to take care of him. His son and daughter live nearby but they work and unable to watch him. He says they stay on the first floor and there are no stairs to enter. He has a wheelchair and a walker at home. The wheelchair can not fit into the bathroom. He is unsure of when he last walked. He says he moved minimally out of his bed when in the other hospital and in rehab. He needs assistance with ADLs.    CURRENT FUNCTIONAL STATUS: He currently needs assistance with ADLs and mobility. Patient was not able to participate with PT with their evaluation.     FAMILY HISTORY   MI (myocardial infarction)  Family history of obesity (Sibling)    RECENT LABS - Reviewed  CBC Full  -  ( 28 Jan 2025 07:36 )  WBC Count : 10.57 K/uL  RBC Count : 2.62 M/uL  Hemoglobin : 7.7 g/dL  Hematocrit : 23.5 %  Platelet Count - Automated : 440 K/uL  Mean Cell Volume : 89.7 fl  Mean Cell Hemoglobin : 29.4 pg  Mean Cell Hemoglobin Concentration : 32.8 g/dL  Auto Neutrophil # : 10.18 K/uL  Auto Lymphocyte # : 0.00 K/uL  Auto Monocyte # : 0.30 K/uL  Auto Eosinophil # : 0.00 K/uL  Auto Basophil # : 0.00 K/uL  Auto Neutrophil % : 93.5 %  Auto Lymphocyte % : 0.0 %  Auto Monocyte % : 2.8 %  Auto Eosinophil % : 0.0 %  Auto Basophil % : 0.0 %    01-28    132[L]  |  99  |  34[H]  ----------------------------<  203[H]  5.8[H]   |  22  |  1.12    Ca    9.8      28 Jan 2025 07:34  Phos  2.0     01-28  Mg     1.8     01-28    TPro  5.3[L]  /  Alb  2.4[L]  /  TBili  0.2  /  DBili  x   /  AST  39  /  ALT  31  /  AlkPhos  88  01-28    Urinalysis Basic - ( 28 Jan 2025 07:34 )    Color: x / Appearance: x / SG: x / pH: x  Gluc: 203 mg/dL / Ketone: x  / Bili: x / Urobili: x   Blood: x / Protein: x / Nitrite: x   Leuk Esterase: x / RBC: x / WBC x   Sq Epi: x / Non Sq Epi: x / Bacteria: x    ALLERGIES  No Known Allergies    MEDICATIONS   acetaminophen     Tablet .. 650 milliGRAM(s) Oral every 6 hours PRN  amLODIPine   Tablet 10 milliGRAM(s) Oral daily  artificial tears (preservative free) Ophthalmic Solution 1 Drop(s) Both EYES every 6 hours  buPROPion XL (24-Hour) . 300 milliGRAM(s) Oral daily  carvedilol 12.5 milliGRAM(s) Oral every 12 hours  chlorhexidine 4% Liquid 1 Application(s) Topical <User Schedule>  cloNIDine 0.1 milliGRAM(s) Oral three times a day  dextrose 5%. 1000 milliLiter(s) IV Continuous <Continuous>  dextrose 5%. 1000 milliLiter(s) IV Continuous <Continuous>  dextrose 50% Injectable 25 Gram(s) IV Push once  dextrose Oral Gel 15 Gram(s) Oral once PRN  enoxaparin Injectable 40 milliGRAM(s) SubCutaneous <User Schedule>  escitalopram 10 milliGRAM(s) Oral daily  glucagon  Injectable 1 milliGRAM(s) IntraMuscular once  hydrALAZINE 100 milliGRAM(s) Oral every 8 hours  insulin glargine Injectable (LANTUS) 42 Unit(s) SubCutaneous at bedtime  insulin lispro (ADMELOG) corrective regimen sliding scale   SubCutaneous three times a day before meals  insulin lispro (ADMELOG) corrective regimen sliding scale   SubCutaneous at bedtime  insulin lispro Injectable (ADMELOG) 26 Unit(s) SubCutaneous three times a day before meals  lactulose Syrup 15 Gram(s) Oral <User Schedule>  levothyroxine 88 MICROGram(s) Oral daily  pantoprazole   Suspension 40 milliGRAM(s) Oral daily  polyethylene glycol 3350 17 Gram(s) Oral at bedtime  polyethylene glycol 3350 17 Gram(s) Oral daily PRN  predniSONE   Tablet 40 milliGRAM(s) Oral two times a day  rosuvastatin 10 milliGRAM(s) Oral at bedtime  senna 2 Tablet(s) Oral at bedtime  sodium chloride 0.9% lock flush 10 milliLiter(s) IV Push every 1 hour PRN  sodium chloride 0.9%. 1000 milliLiter(s) IV Continuous <Continuous>  trimethoprim / sulfamethoxazole IVPB 320 milliGRAM(s) IV Intermittent every 8 hours  valACYclovir 500 milliGRAM(s) Oral every 12 hours  ----------------------------------------------------------------------------------------  PHYSICAL EXAM  Constitutional - Frustrated, Comfortable  HEENT - NCAT  Chest - Breathing comfortably, No wheezing  Abdomen - Soft   Extremities - No calf tenderness, Able to move bilateral UE and LE greater than antigravity in the bed.   Neurologic Exam -                    Cognitive - Awake, Alert     Communication - Fluent     Sensory - Decreased to light touch in bilateral LE and UE especially in the feet  Psychiatric - Mood stable but frustrated and upset  ----------------------------------------------------------------------------------------  ASSESSMENT/PLAN  69yMale with functional deficits after urosepsis with Klebsiella ESBL (Zosyn sensitive) and nasal corona virus.     #Bilateral LE pain (Left worse than Right)  -He has pain when he bears weight especially on the LLE. He has history of prior falls and prior left hip fracture s/p hip arthroplasty. He also has history of DLBCL. 1/23/25 Bilateral Hip X-ray showed no acute findings.  -Recommend medications for pain management. Could consider standing Tylenol but primary team would need to monitor liver enzymes since his liver enzymes are mildly elevated. Lymphoma can cause bone pain which can be managed with steroids, NSAIDs and opioids. He is already on predniSONE Tablet 40 milliGRAM(s) Oral two times a day. I do not recommend NSAIDs due to his anemia and eventual plan to resume cancer-related treatment. Could consider starting with oxycodone 5 mg q6hr PRN. Monitor for constipation if the patient gets started on oxycodone. He is currently on a bowel regimen. Recommend giving patient pain medication prior to PT to increase success of tolerance of therapy.    #Chemotherapy-induced peripheral neuropathy  -This can be painful for some patient and could also be contributing to his pain. Could consider Gabapentin 300 mg TID. Could also consider topical medications such as lidocaine or capsaicin cream PRN as long as he has no open wounds or skin infections.     #Difficulty with mobility and ADLs  -Patient is on enoxaparin for DVT ppx  -This is multifactorial and could be secondary to being deconditioned after minimal mobility during hospitalization and pain due to his lymphoma or cancer-related treatment  -Recommend medications for pain management as listed above listed under the Bilateral LE pain   -If no improvement or slow improvement then could consider an EMG/NCS outpatient  -Patient has anemia and this will need to be monitored. If his hemoglobin drops below 7 then he should not participate in therapy.  -Continue bedside therapy as well as OOB throughout the day with mobilization by staff to maintain cardiopulmonary function and prevention of secondary complications related to debility.   -Patient is not an acute rehab candidate as they are not able to tolerate 3 hours of therapy per day since he is currently not participating in therapy. Patient's wife can not take care of him at home and his kids can not always be there since they work. Patient is not interested in a nursing home. We would also need to know what the home plan would be after discharge from rehab. Due to his prior history of multiple falls, he may not be safe at home by himself. He also doesn't want to return to the prior rehab facility because he said he was treated badly.   -Will continue to follow. Functional progress will determine ongoing rehab dispo recommendations, which may change.    I communicated with ___________, one of the providers for the primary team, about my assessment and recommendations verbally in-person.     Spent a total of ____ minutes including preparing for the consult, obtaining history, performing physical exam, documenting and communicating with co-providers. Patient is a 69y old  Male who was admitted for chemotherapy but then had his treatment held due to fevers and found to have urosepsis with Klebsiella ESBL (Zosyn sensitive) and nasal corona virus.    HPI:  68 y/o M PMHx renal transplant 2012 (2/2 DM, s/p left nephrectomy), peripheral neuropathy, hypothyroidism, retroperitoneal fibrosis, HTN, HLD, GERD, anxiety/depression, ANGELIKA and recent admission at [facility] for sacral osteomyelitis and ESBL.coli urosepsis discharged on 12/18/24 to rehab. While admitted to [place] was noted to have hypercalcemia and liver masses which were biopsied on 12/16/24, biopsy revealed DLBCL. Patient received R mini CHOP on 12/28 now admitted for cycle #2 Rmini CHOP, upon admission patient was febrile so chemotherapy was held. He was found to have urosepsis with Klebsiella ESBL (Zosyn sensitive), nasal corona virus and concern for PCP. Plan by primary team includes discussing treatment with Tafa/Rev since his current rehab facility will not allow for transport to outpatient oncology to treat his lymphoma.     He was seen by PT on 1/24/25 and patient declined therapy.    AM-PAC Functional Assessment: Basic Mobility  Type of Assessment: Daily assessment  Turning from your back to your side while in a flat bed without using bedrails?: 3 = A little assistance  Moving from lying on your back to sitting on the flat side of a flat bed without using bedrails?: 2 = A lot of assistance  Moving to and from a bed to a chair (including a wheelchair)?: 2 = A lot of assistance  Standing up from a chair using your arms (e.g. wheelchair or bedside chair)?: 2 = A lot of assistance  Walking in hospital room?: 1 = Total assistance  Climbing 3-5 steps with a railing?: 1 = Total assistance  Score: 11   Row Comment: Ask the patient "How much help from another person do you currently need? (If the patient hasn't done an activity recently, how much help from another person do you think he/she needs if he/she tried?)    Activity  Activity Type: Active range of motion exercises;  Activity adjusted per tolerance  Activity Assistance Provided: A lot of assistance  Assistive Device Utilized: Total assist lift    He was approved for Tafa/REV but given PCP Dx, his therapy is on hold. He is on IV bactrim which was started on 1/24/25 with a plan for a 21day course.     I also spoke to his wife who was also in the room with the patient. Patient is motivated to try and participate more in therapy today. His wife is also interested in therapy upon discharge from the hospital. His wife states that the patient participated with therapy more today.     He currently denies any pain while in bed but endorses discomfort when ambulating.     Imaging reviewed showed:  1/23/25 CT C/A/P:     BONES: Diffuse erosive changes of the sacrum with soft tissue   infiltration, similar in appearance to prior. Sclerotic focus in the left   anterolateral seventh rib. Chronic rib fractures. Chronic left humeral   fracture. Left hip replacement.    IMPRESSION:    Diffuse bilateral patchy groundglass opacities with central predominance,   increased from 1/17/2025, may represent edema or pneumonia.  Unchanged left lower lobe round atelectasis.  Small left pleural effusion.  Unchanged retroperitoneal soft tissue encasing the aorta and IVC.  Diffuse erosive changes of the sacrum with soft tissue infiltration,   similar in appearance to prior, which could represent osteomyelitis.    1/23/25 Bilateral Hip X-ray:  Postoperative Changes: Cemented left hip hemiarthroplasty is again   seen. Slight lucency is again seen at the bone cement interface of the   medial intertrochanteric region. There is mild adjacent heterotopic   ossification.    Joint Space(s):  Right hip and sacroiliac joints appear maintained.    OTHER FINDINGS:  Severe atherosclerotic calcifications are present.   Pelvic surgical clips are.    IMPRESSION:  1.  No acute osseous abnormalities.  2.  Sacral insufficiency fractures are better seen on CT.    REVIEW OF SYSTEMS: Negative unless stated in the HPI    VITALS  T(C): 37.2 (01-28-25 @ 09:49), Max: 37.2 (01-28-25 @ 09:49)  HR: 72 (01-28-25 @ 13:01) (65 - 81)  BP: 124/76 (01-28-25 @ 09:49) (124/76 - 169/77)  RR: 18 (01-28-25 @ 09:49) (17 - 20)  SpO2: 93% (01-28-25 @ 13:01) (93% - 98%)    PAST MEDICAL & SURGICAL HISTORY  Diabetes Mellitus Type II  ESRD on Dialysis  GERD (gastroesophageal reflux disease)  Depression  Hypothyroidism  Hyperlipidemia  Kidney transplanted  Hypertension  ANGELIKA (obstructive sleep apnea)  Peripheral neuropathy  Shoulder fracture  Hypothyroidism  History of renal transplant  DLBCL (diffuse large B cell lymphoma)  S/P kidney transplant  S/P cholecystectomy  Retroperitoneal fibrosis  AV fistula  S/P right cataract extraction  S/P left cataract extraction  H/O unilateral nephrectomy    SOCIAL HISTORY - as per documentation/history    FUNCTIONAL HISTORY:  He lives in home with his wife who has MS. He said she is unable to take care of him. His son and daughter live nearby but they work and unable to watch him. He says they stay on the first floor and there are no stairs to enter. He has a wheelchair and a walker at home. The wheelchair can not fit into the bathroom. He is unsure of when he last walked. He says he moved minimally out of his bed when in the other hospital and in rehab. He needs assistance with ADLs.    CURRENT FUNCTIONAL STATUS: He currently needs assistance with ADLs and mobility. Patient was not able to participate with PT with their evaluation.     FAMILY HISTORY   MI (myocardial infarction)  Family history of obesity (Sibling)    RECENT LABS - Reviewed  CBC Full  -  ( 28 Jan 2025 07:36 )  WBC Count : 10.57 K/uL  RBC Count : 2.62 M/uL  Hemoglobin : 7.7 g/dL  Hematocrit : 23.5 %  Platelet Count - Automated : 440 K/uL  Mean Cell Volume : 89.7 fl  Mean Cell Hemoglobin : 29.4 pg  Mean Cell Hemoglobin Concentration : 32.8 g/dL  Auto Neutrophil # : 10.18 K/uL  Auto Lymphocyte # : 0.00 K/uL  Auto Monocyte # : 0.30 K/uL  Auto Eosinophil # : 0.00 K/uL  Auto Basophil # : 0.00 K/uL  Auto Neutrophil % : 93.5 %  Auto Lymphocyte % : 0.0 %  Auto Monocyte % : 2.8 %  Auto Eosinophil % : 0.0 %  Auto Basophil % : 0.0 %    01-28    132[L]  |  99  |  34[H]  ----------------------------<  203[H]  5.8[H]   |  22  |  1.12    Ca    9.8      28 Jan 2025 07:34  Phos  2.0     01-28  Mg     1.8     01-28    TPro  5.3[L]  /  Alb  2.4[L]  /  TBili  0.2  /  DBili  x   /  AST  39  /  ALT  31  /  AlkPhos  88  01-28    Urinalysis Basic - ( 28 Jan 2025 07:34 )    Color: x / Appearance: x / SG: x / pH: x  Gluc: 203 mg/dL / Ketone: x  / Bili: x / Urobili: x   Blood: x / Protein: x / Nitrite: x   Leuk Esterase: x / RBC: x / WBC x   Sq Epi: x / Non Sq Epi: x / Bacteria: x    ALLERGIES  No Known Allergies    MEDICATIONS   acetaminophen     Tablet .. 650 milliGRAM(s) Oral every 6 hours PRN  amLODIPine   Tablet 10 milliGRAM(s) Oral daily  artificial tears (preservative free) Ophthalmic Solution 1 Drop(s) Both EYES every 6 hours  buPROPion XL (24-Hour) . 300 milliGRAM(s) Oral daily  carvedilol 12.5 milliGRAM(s) Oral every 12 hours  chlorhexidine 4% Liquid 1 Application(s) Topical <User Schedule>  cloNIDine 0.1 milliGRAM(s) Oral three times a day  dextrose 5%. 1000 milliLiter(s) IV Continuous <Continuous>  dextrose 5%. 1000 milliLiter(s) IV Continuous <Continuous>  dextrose 50% Injectable 25 Gram(s) IV Push once  dextrose Oral Gel 15 Gram(s) Oral once PRN  enoxaparin Injectable 40 milliGRAM(s) SubCutaneous <User Schedule>  escitalopram 10 milliGRAM(s) Oral daily  glucagon  Injectable 1 milliGRAM(s) IntraMuscular once  hydrALAZINE 100 milliGRAM(s) Oral every 8 hours  insulin glargine Injectable (LANTUS) 42 Unit(s) SubCutaneous at bedtime  insulin lispro (ADMELOG) corrective regimen sliding scale   SubCutaneous three times a day before meals  insulin lispro (ADMELOG) corrective regimen sliding scale   SubCutaneous at bedtime  insulin lispro Injectable (ADMELOG) 26 Unit(s) SubCutaneous three times a day before meals  lactulose Syrup 15 Gram(s) Oral <User Schedule>  levothyroxine 88 MICROGram(s) Oral daily  pantoprazole   Suspension 40 milliGRAM(s) Oral daily  polyethylene glycol 3350 17 Gram(s) Oral at bedtime  polyethylene glycol 3350 17 Gram(s) Oral daily PRN  predniSONE   Tablet 40 milliGRAM(s) Oral two times a day  rosuvastatin 10 milliGRAM(s) Oral at bedtime  senna 2 Tablet(s) Oral at bedtime  sodium chloride 0.9% lock flush 10 milliLiter(s) IV Push every 1 hour PRN  sodium chloride 0.9%. 1000 milliLiter(s) IV Continuous <Continuous>  trimethoprim / sulfamethoxazole IVPB 320 milliGRAM(s) IV Intermittent every 8 hours  valACYclovir 500 milliGRAM(s) Oral every 12 hours  ----------------------------------------------------------------------------------------  PHYSICAL EXAM  Constitutional - Frustrated, Comfortable  HEENT - NCAT  Chest - Breathing comfortably, No wheezing  Abdomen - Soft   Extremities - No calf tenderness, Able to move bilateral UE and LE greater than antigravity in the bed.   Neurologic Exam -                    Cognitive - Awake, Alert     Communication - Fluent     Sensory - Decreased to light touch in bilateral LE and UE especially in the feet  Psychiatric - Mood stable but frustrated and upset  ----------------------------------------------------------------------------------------  ASSESSMENT/PLAN  69yMale with functional deficits after urosepsis with Klebsiella ESBL (Zosyn sensitive) and nasal corona virus.     #Bilateral LE pain (Left worse than Right)  -He has pain when he bears weight especially on the LLE. He has history of prior falls and prior left hip fracture s/p hip arthroplasty. He also has history of DLBCL. 1/23/25 Bilateral Hip X-ray showed no acute findings.  -Recommend medications for pain management. Could consider standing Tylenol but primary team would need to monitor liver enzymes since his liver enzymes are mildly elevated. Lymphoma can cause bone pain which can be managed with steroids, NSAIDs and opioids. He is already on predniSONE Tablet 40 milliGRAM(s) Oral two times a day. I do not recommend NSAIDs due to his anemia and eventual plan to resume cancer-related treatment. Could consider starting with oxycodone 5 mg q6hr PRN. Monitor for constipation if the patient gets started on oxycodone. He is currently on a bowel regimen. Recommend giving patient pain medication prior to PT to increase success of tolerance of therapy.    #Chemotherapy-induced peripheral neuropathy  -This can be painful for some patient and could also be contributing to his pain. Could consider Gabapentin 300 mg TID. Could also consider topical medications such as lidocaine or capsaicin cream PRN as long as he has no open wounds or skin infections.     #Difficulty with mobility and ADLs  -Patient is on enoxaparin for DVT ppx  -This is multifactorial and could be secondary to being deconditioned after minimal mobility during hospitalization and pain due to his lymphoma or cancer-related treatment  -Recommend medications for pain management as listed above listed under the Bilateral LE pain   -If no improvement or slow improvement then could consider an EMG/NCS outpatient  -Patient has anemia and this will need to be monitored. If his hemoglobin drops below 7 then he should not participate in therapy.  -Continue bedside therapy as well as OOB throughout the day with mobilization by staff to maintain cardiopulmonary function and prevention of secondary complications related to debility.   -Patient is not an acute rehab candidate as they are not able to tolerate 3 hours of therapy per day since he is currently not participating in therapy. Patient's wife can not take care of him at home and his kids can not always be there since they work. Patient is not interested in a nursing home. We would also need to know what the home plan would be after discharge from rehab. Due to his prior history of multiple falls, he may not be safe at home by himself. He also doesn't want to return to the prior rehab facility because he said he was treated badly.   -Will continue to follow. Functional progress will determine ongoing rehab dispo recommendations, which may change.    I communicated with ___________, one of the providers for the primary team, about my assessment and recommendations verbally in-person.     Spent a total of ____ minutes including preparing for the consult, obtaining history, performing physical exam, documenting and communicating with co-providers. Patient is a 69y old  Male who was admitted for chemotherapy but then had his treatment held due to fevers and found to have urosepsis with Klebsiella ESBL (Zosyn sensitive) and nasal corona virus.    HPI:  70 y/o M PMHx renal transplant 2012 (2/2 DM, s/p left nephrectomy), peripheral neuropathy, hypothyroidism, retroperitoneal fibrosis, HTN, HLD, GERD, anxiety/depression, ANGELIKA and recent admission at [facility] for sacral osteomyelitis and ESBL.coli urosepsis discharged on 12/18/24 to rehab. While admitted to [place] was noted to have hypercalcemia and liver masses which were biopsied on 12/16/24, biopsy revealed DLBCL. Patient received R mini CHOP on 12/28 now admitted for cycle #2 Rmini CHOP, upon admission patient was febrile so chemotherapy was held. He was found to have urosepsis with Klebsiella ESBL (Zosyn sensitive), nasal corona virus and concern for PCP. Plan by primary team includes discussing treatment with Tafa/Rev since his current rehab facility will not allow for transport to outpatient oncology to treat his lymphoma.     He was seen by PT on 1/24/25 and patient declined therapy. PT evaluated the patient again in 1/28 and he participated. He needed a lot of assistance with moving from bed to chair and standing up from a chair using his arms. He need total assistance when walking in the hospital room and climbing 3-5 steps with a railing. He scored a 11 on the AM-PAC Functional Assessment: Basic Mobility. He hasn't been seen by OT.     He was approved for Tafa/REV but given PCP Dx, his therapy is on hold. He is on IV bactrim which was started on 1/24/25 with a plan for a 21day course.     I also spoke to his wife who was also in the room with the patient. Patient is motivated to try and participate more in therapy today. His wife is also interested in therapy upon discharge from the hospital. His wife states that the patient participated with therapy more today.     He currently denies any pain while in bed but endorses discomfort when ambulating. He was given an one time dose of 2.5 mg oxycodone but patient doesn't remember if it helped.     Imaging reviewed showed:  1/23/25 CT C/A/P:     BONES: Diffuse erosive changes of the sacrum with soft tissue   infiltration, similar in appearance to prior. Sclerotic focus in the left   anterolateral seventh rib. Chronic rib fractures. Chronic left humeral   fracture. Left hip replacement.    IMPRESSION:    Diffuse bilateral patchy groundglass opacities with central predominance,   increased from 1/17/2025, may represent edema or pneumonia.  Unchanged left lower lobe round atelectasis.  Small left pleural effusion.  Unchanged retroperitoneal soft tissue encasing the aorta and IVC.  Diffuse erosive changes of the sacrum with soft tissue infiltration,   similar in appearance to prior, which could represent osteomyelitis.    1/23/25 Bilateral Hip X-ray:  Postoperative Changes: Cemented left hip hemiarthroplasty is again   seen. Slight lucency is again seen at the bone cement interface of the   medial intertrochanteric region. There is mild adjacent heterotopic   ossification.    Joint Space(s):  Right hip and sacroiliac joints appear maintained.    OTHER FINDINGS:  Severe atherosclerotic calcifications are present.   Pelvic surgical clips are.    IMPRESSION:  1.  No acute osseous abnormalities.  2.  Sacral insufficiency fractures are better seen on CT.    REVIEW OF SYSTEMS: Negative unless stated in the HPI    VITALS  T(C): 37.2 (01-28-25 @ 09:49), Max: 37.2 (01-28-25 @ 09:49)  HR: 72 (01-28-25 @ 13:01) (65 - 81)  BP: 124/76 (01-28-25 @ 09:49) (124/76 - 169/77)  RR: 18 (01-28-25 @ 09:49) (17 - 20)  SpO2: 93% (01-28-25 @ 13:01) (93% - 98%)    PAST MEDICAL & SURGICAL HISTORY  Diabetes Mellitus Type II  ESRD on Dialysis  GERD (gastroesophageal reflux disease)  Depression  Hypothyroidism  Hyperlipidemia  Kidney transplanted  Hypertension  ANGELIKA (obstructive sleep apnea)  Peripheral neuropathy  Shoulder fracture  Hypothyroidism  History of renal transplant  DLBCL (diffuse large B cell lymphoma)  S/P kidney transplant  S/P cholecystectomy  Retroperitoneal fibrosis  AV fistula  S/P right cataract extraction  S/P left cataract extraction  H/O unilateral nephrectomy    SOCIAL HISTORY - as per documentation/history    FUNCTIONAL HISTORY:  He lives in home with his wife who has MS. He said she is unable to take care of him. His son and daughter live nearby but they work and unable to watch him. He says they stay on the first floor and there are no stairs to enter. He has a wheelchair and a walker at home. The wheelchair can not fit into the bathroom. He is unsure of when he last walked. He says he moved minimally out of his bed when in the other hospital and in rehab. He needs assistance with ADLs.    CURRENT FUNCTIONAL STATUS:  Based on 1/28 PT notes: He needed a lot of assistance with moving from bed to chair and standing up from a chair using his arms. He need total assistance when walking in the hospital room and climbing 3-5 steps with a railing. He scored a 11 on the AM-PAC Functional Assessment: Basic Mobility. He hasn't been seen by OT.     FAMILY HISTORY   MI (myocardial infarction)  Family history of obesity (Sibling)    RECENT LABS - Reviewed  CBC Full  -  ( 28 Jan 2025 07:36 )  WBC Count : 10.57 K/uL  RBC Count : 2.62 M/uL  Hemoglobin : 7.7 g/dL  Hematocrit : 23.5 %  Platelet Count - Automated : 440 K/uL  Mean Cell Volume : 89.7 fl  Mean Cell Hemoglobin : 29.4 pg  Mean Cell Hemoglobin Concentration : 32.8 g/dL  Auto Neutrophil # : 10.18 K/uL  Auto Lymphocyte # : 0.00 K/uL  Auto Monocyte # : 0.30 K/uL  Auto Eosinophil # : 0.00 K/uL  Auto Basophil # : 0.00 K/uL  Auto Neutrophil % : 93.5 %  Auto Lymphocyte % : 0.0 %  Auto Monocyte % : 2.8 %  Auto Eosinophil % : 0.0 %  Auto Basophil % : 0.0 %    01-28    132[L]  |  99  |  34[H]  ----------------------------<  203[H]  5.8[H]   |  22  |  1.12    Ca    9.8      28 Jan 2025 07:34  Phos  2.0     01-28  Mg     1.8     01-28    TPro  5.3[L]  /  Alb  2.4[L]  /  TBili  0.2  /  DBili  x   /  AST  39  /  ALT  31  /  AlkPhos  88  01-28    Urinalysis Basic - ( 28 Jan 2025 07:34 )    Color: x / Appearance: x / SG: x / pH: x  Gluc: 203 mg/dL / Ketone: x  / Bili: x / Urobili: x   Blood: x / Protein: x / Nitrite: x   Leuk Esterase: x / RBC: x / WBC x   Sq Epi: x / Non Sq Epi: x / Bacteria: x    ALLERGIES  No Known Allergies    MEDICATIONS   acetaminophen     Tablet .. 650 milliGRAM(s) Oral every 6 hours PRN  amLODIPine   Tablet 10 milliGRAM(s) Oral daily  artificial tears (preservative free) Ophthalmic Solution 1 Drop(s) Both EYES every 6 hours  buPROPion XL (24-Hour) . 300 milliGRAM(s) Oral daily  carvedilol 12.5 milliGRAM(s) Oral every 12 hours  chlorhexidine 4% Liquid 1 Application(s) Topical <User Schedule>  cloNIDine 0.1 milliGRAM(s) Oral three times a day  dextrose 5%. 1000 milliLiter(s) IV Continuous <Continuous>  dextrose 5%. 1000 milliLiter(s) IV Continuous <Continuous>  dextrose 50% Injectable 25 Gram(s) IV Push once  dextrose Oral Gel 15 Gram(s) Oral once PRN  enoxaparin Injectable 40 milliGRAM(s) SubCutaneous <User Schedule>  escitalopram 10 milliGRAM(s) Oral daily  glucagon  Injectable 1 milliGRAM(s) IntraMuscular once  hydrALAZINE 100 milliGRAM(s) Oral every 8 hours  insulin glargine Injectable (LANTUS) 42 Unit(s) SubCutaneous at bedtime  insulin lispro (ADMELOG) corrective regimen sliding scale   SubCutaneous three times a day before meals  insulin lispro (ADMELOG) corrective regimen sliding scale   SubCutaneous at bedtime  insulin lispro Injectable (ADMELOG) 26 Unit(s) SubCutaneous three times a day before meals  lactulose Syrup 15 Gram(s) Oral <User Schedule>  levothyroxine 88 MICROGram(s) Oral daily  pantoprazole   Suspension 40 milliGRAM(s) Oral daily  polyethylene glycol 3350 17 Gram(s) Oral at bedtime  polyethylene glycol 3350 17 Gram(s) Oral daily PRN  predniSONE   Tablet 40 milliGRAM(s) Oral two times a day  rosuvastatin 10 milliGRAM(s) Oral at bedtime  senna 2 Tablet(s) Oral at bedtime  sodium chloride 0.9% lock flush 10 milliLiter(s) IV Push every 1 hour PRN  sodium chloride 0.9%. 1000 milliLiter(s) IV Continuous <Continuous>  trimethoprim / sulfamethoxazole IVPB 320 milliGRAM(s) IV Intermittent every 8 hours  valACYclovir 500 milliGRAM(s) Oral every 12 hours  ----------------------------------------------------------------------------------------  PHYSICAL EXAM  Constitutional - Comfortable  HEENT - NCAT  Chest - Breathing comfortably, No wheezing  Abdomen - Soft   Extremities - No calf tenderness, Able to move bilateral UE and LE greater than antigravity in the bed.   Neurologic Exam -                    Cognitive - Awake, Alert     Communication - Fluent     Sensory - Decreased to light touch in bilateral LE and UE especially in the feet  Psychiatric - Mood is improved on 1/28 when compared to prior week  ----------------------------------------------------------------------------------------  ASSESSMENT/PLAN  69yMale with functional deficits after urosepsis with Klebsiella ESBL (Zosyn sensitive) and nasal corona virus.     #Bilateral LE pain (Left worse than Right)  -He endorsed pain when he beared weight especially on the LLE last week. He has history of prior falls and prior left hip fracture s/p hip arthroplasty. He also has history of DLBCL. 1/23/25 Bilateral Hip X-ray showed no acute findings. He currently denies any significant pain and primary team doesn't feel like he is in pain.   -He was given an one time dose of 2.5 mg oxycodone  -Recommend medications for pain management. Could consider standing Tylenol but primary team would need to monitor liver enzymes since his liver enzymes are mildly elevated. Lymphoma can cause bone pain which can be managed with steroids, NSAIDs and opioids. He is already on predniSONE Tablet 40 milliGRAM(s) Oral two times a day. I do not recommend NSAIDs due to his anemia and eventual plan to resume cancer-related treatment. Could consider starting with oxycodone 5 mg q6hr PRN. Monitor for constipation if the patient gets started on oxycodone. He is currently on a bowel regimen. Recommend giving patient pain medication prior to PT to increase success of tolerance of therapy.    #Chemotherapy-induced peripheral neuropathy  -This can be painful for some patient and could also be contributing to his pain. Could consider Gabapentin 300 mg TID. Could also consider topical medications such as lidocaine or capsaicin cream PRN as long as he has no open wounds or skin infections.     #Difficulty with mobility and ADLs  -Patient is on enoxaparin for DVT ppx  -This is multifactorial and could be secondary to being deconditioned after minimal mobility during hospitalization and pain due to his lymphoma or cancer-related treatment  -Recommend medications for pain management as listed above listed under the Bilateral LE pain   -If no improvement or slow improvement then could consider an EMG/NCS outpatient  -Patient has anemia and this will need to be monitored. If his hemoglobin drops below 7 then he should not participate in therapy.  -Continue bedside therapy as well as OOB throughout the day with mobilization by staff to maintain cardiopulmonary function and prevention of secondary complications related to debility.   -Patient is not an acute rehab candidate as they are not able to tolerate 3 hours of therapy per day since he is currently not participating in therapy. Patient's wife can not take care of him at home and his kids can not always be there since they work. Patient is not interested in a nursing home. We would also need to know what the home plan would be after discharge from rehab. Due to his prior history of multiple falls, he may not be safe at home by himself. He also doesn't want to return to the prior rehab facility because he said he was treated badly.   -Will continue to follow. Functional progress will determine ongoing rehab dispo recommendations, which may change.    I communicated with ___________, one of the providers for the primary team, about my assessment and recommendations verbally in-person.     Spent a total of ____ minutes including preparing for the consult, obtaining history, performing physical exam, documenting and communicating with co-providers. Patient is a 69y old  Male who was admitted for chemotherapy but then had his treatment held due to fevers and found to have urosepsis with Klebsiella ESBL (Zosyn sensitive) and nasal corona virus.    HPI:  70 y/o M PMHx renal transplant 2012 (2/2 DM, s/p left nephrectomy), peripheral neuropathy, hypothyroidism, retroperitoneal fibrosis, HTN, HLD, GERD, anxiety/depression, ANGELIKA and recent admission at [facility] for sacral osteomyelitis and ESBL.coli urosepsis discharged on 12/18/24 to rehab. While admitted to [place] was noted to have hypercalcemia and liver masses which were biopsied on 12/16/24, biopsy revealed DLBCL. Patient received R mini CHOP on 12/28 now admitted for cycle #2 Rmini CHOP, upon admission patient was febrile so chemotherapy was held. He was found to have urosepsis with Klebsiella ESBL (Zosyn sensitive), nasal corona virus and concern for PCP. Plan by primary team includes discussing treatment with Tafa/Rev since his current rehab facility will not allow for transport to outpatient oncology to treat his lymphoma.     He was seen by PT on 1/24/25 and patient declined therapy. PT evaluated the patient again in 1/28 and he participated. He needed a lot of assistance with moving from bed to chair and standing up from a chair using his arms. He need total assistance when walking in the hospital room and climbing 3-5 steps with a railing. He scored a 11 on the AM-PAC Functional Assessment: Basic Mobility. He hasn't been seen by OT.     He was approved for Tafa/REV but given PCP Dx, his therapy is on hold. He is on IV bactrim which was started on 1/24/25 with a plan for a 21day course.     I also spoke to his wife who was also in the room with the patient. Patient is motivated to try and participate more in therapy today. His wife is also interested in therapy upon discharge from the hospital. His wife states that the patient participated with therapy more today.     He currently denies any pain while in bed but endorses discomfort when ambulating. He was given an one time dose of 2.5 mg oxycodone on 1/22 but patient doesn't remember if it helped.     Endorses diffuse weakness especially in the lower extremities.     Imaging reviewed showed:  1/23/25 CT C/A/P:     BONES: Diffuse erosive changes of the sacrum with soft tissue   infiltration, similar in appearance to prior. Sclerotic focus in the left   anterolateral seventh rib. Chronic rib fractures. Chronic left humeral   fracture. Left hip replacement.    IMPRESSION:    Diffuse bilateral patchy groundglass opacities with central predominance,   increased from 1/17/2025, may represent edema or pneumonia.  Unchanged left lower lobe round atelectasis.  Small left pleural effusion.  Unchanged retroperitoneal soft tissue encasing the aorta and IVC.  Diffuse erosive changes of the sacrum with soft tissue infiltration,   similar in appearance to prior, which could represent osteomyelitis.    1/23/25 Bilateral Hip X-ray:  Postoperative Changes: Cemented left hip hemiarthroplasty is again   seen. Slight lucency is again seen at the bone cement interface of the   medial intertrochanteric region. There is mild adjacent heterotopic   ossification.    Joint Space(s):  Right hip and sacroiliac joints appear maintained.    OTHER FINDINGS:  Severe atherosclerotic calcifications are present.   Pelvic surgical clips are.    IMPRESSION:  1.  No acute osseous abnormalities.  2.  Sacral insufficiency fractures are better seen on CT.    REVIEW OF SYSTEMS: Negative unless stated in the HPI    VITALS  T(C): 37.2 (01-28-25 @ 09:49), Max: 37.2 (01-28-25 @ 09:49)  HR: 72 (01-28-25 @ 13:01) (65 - 81)  BP: 124/76 (01-28-25 @ 09:49) (124/76 - 169/77)  RR: 18 (01-28-25 @ 09:49) (17 - 20)  SpO2: 93% (01-28-25 @ 13:01) (93% - 98%)    PAST MEDICAL & SURGICAL HISTORY  Diabetes Mellitus Type II  ESRD on Dialysis  GERD (gastroesophageal reflux disease)  Depression  Hypothyroidism  Hyperlipidemia  Kidney transplanted  Hypertension  ANGELIKA (obstructive sleep apnea)  Peripheral neuropathy  Shoulder fracture  Hypothyroidism  History of renal transplant  DLBCL (diffuse large B cell lymphoma)  S/P kidney transplant  S/P cholecystectomy  Retroperitoneal fibrosis  AV fistula  S/P right cataract extraction  S/P left cataract extraction  H/O unilateral nephrectomy    SOCIAL HISTORY - as per documentation/history    FUNCTIONAL HISTORY:  He lives in home with his wife who has MS. He said she is unable to take care of him. His son and daughter live nearby but they work and unable to watch him. He says they stay on the first floor and there are no stairs to enter. He has a wheelchair and a walker at home. The wheelchair can not fit into the bathroom. He is unsure of when he last walked. He says he moved minimally out of his bed when in the other hospital and in rehab. He needs assistance with ADLs.    CURRENT FUNCTIONAL STATUS:  Based on 1/28 PT notes: He needed a lot of assistance with moving from bed to chair and standing up from a chair using his arms. He need total assistance when walking in the hospital room and climbing 3-5 steps with a railing. He scored a 11 on the AM-PAC Functional Assessment: Basic Mobility. He hasn't been seen by OT.     FAMILY HISTORY   MI (myocardial infarction)  Family history of obesity (Sibling)    RECENT LABS - Reviewed  CBC Full  -  ( 28 Jan 2025 07:36 )  WBC Count : 10.57 K/uL  RBC Count : 2.62 M/uL  Hemoglobin : 7.7 g/dL  Hematocrit : 23.5 %  Platelet Count - Automated : 440 K/uL  Mean Cell Volume : 89.7 fl  Mean Cell Hemoglobin : 29.4 pg  Mean Cell Hemoglobin Concentration : 32.8 g/dL  Auto Neutrophil # : 10.18 K/uL  Auto Lymphocyte # : 0.00 K/uL  Auto Monocyte # : 0.30 K/uL  Auto Eosinophil # : 0.00 K/uL  Auto Basophil # : 0.00 K/uL  Auto Neutrophil % : 93.5 %  Auto Lymphocyte % : 0.0 %  Auto Monocyte % : 2.8 %  Auto Eosinophil % : 0.0 %  Auto Basophil % : 0.0 %    01-28    132[L]  |  99  |  34[H]  ----------------------------<  203[H]  5.8[H]   |  22  |  1.12    Ca    9.8      28 Jan 2025 07:34  Phos  2.0     01-28  Mg     1.8     01-28    TPro  5.3[L]  /  Alb  2.4[L]  /  TBili  0.2  /  DBili  x   /  AST  39  /  ALT  31  /  AlkPhos  88  01-28    Urinalysis Basic - ( 28 Jan 2025 07:34 )    Color: x / Appearance: x / SG: x / pH: x  Gluc: 203 mg/dL / Ketone: x  / Bili: x / Urobili: x   Blood: x / Protein: x / Nitrite: x   Leuk Esterase: x / RBC: x / WBC x   Sq Epi: x / Non Sq Epi: x / Bacteria: x    ALLERGIES  No Known Allergies    MEDICATIONS   acetaminophen     Tablet .. 650 milliGRAM(s) Oral every 6 hours PRN  amLODIPine   Tablet 10 milliGRAM(s) Oral daily  artificial tears (preservative free) Ophthalmic Solution 1 Drop(s) Both EYES every 6 hours  buPROPion XL (24-Hour) . 300 milliGRAM(s) Oral daily  carvedilol 12.5 milliGRAM(s) Oral every 12 hours  chlorhexidine 4% Liquid 1 Application(s) Topical <User Schedule>  cloNIDine 0.1 milliGRAM(s) Oral three times a day  dextrose 5%. 1000 milliLiter(s) IV Continuous <Continuous>  dextrose 5%. 1000 milliLiter(s) IV Continuous <Continuous>  dextrose 50% Injectable 25 Gram(s) IV Push once  dextrose Oral Gel 15 Gram(s) Oral once PRN  enoxaparin Injectable 40 milliGRAM(s) SubCutaneous <User Schedule>  escitalopram 10 milliGRAM(s) Oral daily  glucagon  Injectable 1 milliGRAM(s) IntraMuscular once  hydrALAZINE 100 milliGRAM(s) Oral every 8 hours  insulin glargine Injectable (LANTUS) 42 Unit(s) SubCutaneous at bedtime  insulin lispro (ADMELOG) corrective regimen sliding scale   SubCutaneous three times a day before meals  insulin lispro (ADMELOG) corrective regimen sliding scale   SubCutaneous at bedtime  insulin lispro Injectable (ADMELOG) 26 Unit(s) SubCutaneous three times a day before meals  lactulose Syrup 15 Gram(s) Oral <User Schedule>  levothyroxine 88 MICROGram(s) Oral daily  pantoprazole   Suspension 40 milliGRAM(s) Oral daily  polyethylene glycol 3350 17 Gram(s) Oral at bedtime  polyethylene glycol 3350 17 Gram(s) Oral daily PRN  predniSONE   Tablet 40 milliGRAM(s) Oral two times a day  rosuvastatin 10 milliGRAM(s) Oral at bedtime  senna 2 Tablet(s) Oral at bedtime  sodium chloride 0.9% lock flush 10 milliLiter(s) IV Push every 1 hour PRN  sodium chloride 0.9%. 1000 milliLiter(s) IV Continuous <Continuous>  trimethoprim / sulfamethoxazole IVPB 320 milliGRAM(s) IV Intermittent every 8 hours  valACYclovir 500 milliGRAM(s) Oral every 12 hours  ----------------------------------------------------------------------------------------  PHYSICAL EXAM  Constitutional - Comfortable  HEENT - NCAT  Chest - Breathing comfortably, No wheezing  Abdomen - Soft   Extremities - No calf tenderness, Able to move bilateral UE and LE greater than antigravity in the bed.   Neurologic Exam -                    Cognitive - Awake, Alert     Communication - Fluent     Sensory - Decreased to light touch in bilateral LE and UE especially in the feet  Psychiatric - Mood is improved on 1/28 when compared to prior week  ----------------------------------------------------------------------------------------  ASSESSMENT/PLAN  69yMale with functional deficits after urosepsis with Klebsiella ESBL (Zosyn sensitive) and nasal corona virus.     #Bilateral LE pain (Left worse than Right)  -He endorsed pain with weight-bearing activities especially on the LLE last week. He has history of prior falls and prior left hip fracture s/p hip arthroplasty. He also has history of DLBCL. 1/23/25 Bilateral Hip X-ray showed no acute findings.   -He currently denies any significant pain and primary team doesn't feel like he is in pain. He was given an one time dose of 2.5 mg oxycodone on 1/22 but patient doesn't remember if it helped.     #Chemotherapy-induced peripheral neuropathy  -Patient currently denies any pain  -Previously discussed about oral and topical medications.     #Difficulty with mobility and ADLs  -Patient is on enoxaparin for DVT ppx  -This is multifactorial and could be secondary to being deconditioned after minimal mobility during hospitalization and cancer-related treatment  -Since patient is now participating in PT, interested in inpatient rehab and has a decrease in functional baseline for ADLs, recommend an OT consult   -Patient has anemia and this will need to be monitored. If his hemoglobin drops below 7 then therapy should be put on hold  -Continue bedside therapy as well as OOB throughout the day with mobilization by staff to maintain cardiopulmonary function and prevention of secondary complications related to debility.   -Patient's wife can not take care of him at home and his kids can not always be there since they work. Patient is not interested in a nursing home. We would also need to know what the home plan would be after discharge from rehab. Due to his prior history of multiple falls, he may not be safe at home by himself. He also doesn't want to return to the prior rehab facility because he said he was treated badly.   -Will continue to follow. Functional progress with PT and OT will determine ongoing rehab dispo recommendations, which may change.  -Discussed with the patient and the wife about following-up with one of our cancer rehab physicians upon discharge from inpatient rehab. I provided the wife with our office number. Patient may follow-up with me outpatient or another one of our cancer rehab physicians if that location is more convenient for them.   -I communicated with the primary team in person about today's encounter with the patient.     Spent a total of 35 minutes including preparing for the consult, obtaining history, performing physical exam, documenting and communicating with co-providers. Patient is a 69y old  Male who was admitted for chemotherapy but then had his treatment held due to fevers and found to have urosepsis with Klebsiella ESBL (Zosyn sensitive) and nasal corona virus.    HPI:  68 y/o M PMHx renal transplant 2012 (2/2 DM, s/p left nephrectomy), peripheral neuropathy, hypothyroidism, retroperitoneal fibrosis, HTN, HLD, GERD, anxiety/depression, ANGELIKA and recent admission at [facility] for sacral osteomyelitis and ESBL.coli urosepsis discharged on 12/18/24 to rehab. While admitted to [place] was noted to have hypercalcemia and liver masses which were biopsied on 12/16/24, biopsy revealed DLBCL. Patient received R mini CHOP on 12/28 now admitted for cycle #2 Rmini CHOP, upon admission patient was febrile so chemotherapy was held. He was found to have urosepsis with Klebsiella ESBL (Zosyn sensitive), nasal corona virus and concern for PCP. Plan by primary team includes discussing treatment with Tafa/Rev since his current rehab facility will not allow for transport to outpatient oncology to treat his lymphoma.     He was seen by PT on 1/24/25 and patient declined therapy. PT evaluated the patient again in 1/28 and he participated. He needed a lot of assistance with moving from bed to chair and standing up from a chair using his arms. He need total assistance when walking in the hospital room and climbing 3-5 steps with a railing. He scored a 11 on the AM-PAC Functional Assessment: Basic Mobility. He hasn't been seen by OT.     He was approved for Tafa/REV but given PCP Dx, his therapy is on hold. He is on IV bactrim which was started on 1/24/25 with a plan for a 21day course.     I also spoke to his wife who was also in the room with the patient. Patient is motivated to try and participate more in therapy today. His wife is also interested in therapy upon discharge from the hospital. His wife states that the patient participated with therapy more today.     He currently denies any pain while in bed but endorses discomfort when ambulating. He was given an one time dose of 2.5 mg oxycodone on 1/22 but patient doesn't remember if it helped.     Endorses diffuse weakness especially in the lower extremities.     Imaging reviewed showed:  1/23/25 CT C/A/P:     BONES: Diffuse erosive changes of the sacrum with soft tissue   infiltration, similar in appearance to prior. Sclerotic focus in the left   anterolateral seventh rib. Chronic rib fractures. Chronic left humeral   fracture. Left hip replacement.    IMPRESSION:    Diffuse bilateral patchy groundglass opacities with central predominance,   increased from 1/17/2025, may represent edema or pneumonia.  Unchanged left lower lobe round atelectasis.  Small left pleural effusion.  Unchanged retroperitoneal soft tissue encasing the aorta and IVC.  Diffuse erosive changes of the sacrum with soft tissue infiltration,   similar in appearance to prior, which could represent osteomyelitis.    1/23/25 Bilateral Hip X-ray:  Postoperative Changes: Cemented left hip hemiarthroplasty is again   seen. Slight lucency is again seen at the bone cement interface of the   medial intertrochanteric region. There is mild adjacent heterotopic   ossification.    Joint Space(s):  Right hip and sacroiliac joints appear maintained.    OTHER FINDINGS:  Severe atherosclerotic calcifications are present.   Pelvic surgical clips are.    IMPRESSION:  1.  No acute osseous abnormalities.  2.  Sacral insufficiency fractures are better seen on CT.    REVIEW OF SYSTEMS: Negative unless stated in the HPI    VITALS  T(C): 37.2 (01-28-25 @ 09:49), Max: 37.2 (01-28-25 @ 09:49)  HR: 72 (01-28-25 @ 13:01) (65 - 81)  BP: 124/76 (01-28-25 @ 09:49) (124/76 - 169/77)  RR: 18 (01-28-25 @ 09:49) (17 - 20)  SpO2: 93% (01-28-25 @ 13:01) (93% - 98%)    PAST MEDICAL & SURGICAL HISTORY  Diabetes Mellitus Type II  ESRD on Dialysis  GERD (gastroesophageal reflux disease)  Depression  Hypothyroidism  Hyperlipidemia  Kidney transplanted  Hypertension  ANGELIKA (obstructive sleep apnea)  Peripheral neuropathy  Shoulder fracture  Hypothyroidism  History of renal transplant  DLBCL (diffuse large B cell lymphoma)  S/P kidney transplant  S/P cholecystectomy  Retroperitoneal fibrosis  AV fistula  S/P right cataract extraction  S/P left cataract extraction  H/O unilateral nephrectomy    SOCIAL HISTORY - as per documentation/history    FUNCTIONAL HISTORY:  He lives in home with his wife who has MS. He said she is unable to take care of him. His son and daughter live nearby but they work and unable to watch him. He says they stay on the first floor and there are no stairs to enter. He has a wheelchair and a walker at home. The wheelchair can not fit into the bathroom. He is unsure of when he last walked. He says he moved minimally out of his bed when in the other hospital and in rehab. He needs assistance with ADLs.    CURRENT FUNCTIONAL STATUS:  Based on 1/28 PT notes: He needed a lot of assistance with moving from bed to chair and standing up from a chair using his arms. He need total assistance when walking in the hospital room and climbing 3-5 steps with a railing. He scored a 11 on the AM-PAC Functional Assessment: Basic Mobility. He hasn't been seen by OT.     FAMILY HISTORY   MI (myocardial infarction)  Family history of obesity (Sibling)    RECENT LABS - Reviewed  CBC Full  -  ( 28 Jan 2025 07:36 )  WBC Count : 10.57 K/uL  RBC Count : 2.62 M/uL  Hemoglobin : 7.7 g/dL  Hematocrit : 23.5 %  Platelet Count - Automated : 440 K/uL  Mean Cell Volume : 89.7 fl  Mean Cell Hemoglobin : 29.4 pg  Mean Cell Hemoglobin Concentration : 32.8 g/dL  Auto Neutrophil # : 10.18 K/uL  Auto Lymphocyte # : 0.00 K/uL  Auto Monocyte # : 0.30 K/uL  Auto Eosinophil # : 0.00 K/uL  Auto Basophil # : 0.00 K/uL  Auto Neutrophil % : 93.5 %  Auto Lymphocyte % : 0.0 %  Auto Monocyte % : 2.8 %  Auto Eosinophil % : 0.0 %  Auto Basophil % : 0.0 %    01-28    132[L]  |  99  |  34[H]  ----------------------------<  203[H]  5.8[H]   |  22  |  1.12    Ca    9.8      28 Jan 2025 07:34  Phos  2.0     01-28  Mg     1.8     01-28    TPro  5.3[L]  /  Alb  2.4[L]  /  TBili  0.2  /  DBili  x   /  AST  39  /  ALT  31  /  AlkPhos  88  01-28    Urinalysis Basic - ( 28 Jan 2025 07:34 )    Color: x / Appearance: x / SG: x / pH: x  Gluc: 203 mg/dL / Ketone: x  / Bili: x / Urobili: x   Blood: x / Protein: x / Nitrite: x   Leuk Esterase: x / RBC: x / WBC x   Sq Epi: x / Non Sq Epi: x / Bacteria: x    ALLERGIES  No Known Allergies    MEDICATIONS   acetaminophen     Tablet .. 650 milliGRAM(s) Oral every 6 hours PRN  amLODIPine   Tablet 10 milliGRAM(s) Oral daily  artificial tears (preservative free) Ophthalmic Solution 1 Drop(s) Both EYES every 6 hours  buPROPion XL (24-Hour) . 300 milliGRAM(s) Oral daily  carvedilol 12.5 milliGRAM(s) Oral every 12 hours  chlorhexidine 4% Liquid 1 Application(s) Topical <User Schedule>  cloNIDine 0.1 milliGRAM(s) Oral three times a day  dextrose 5%. 1000 milliLiter(s) IV Continuous <Continuous>  dextrose 5%. 1000 milliLiter(s) IV Continuous <Continuous>  dextrose 50% Injectable 25 Gram(s) IV Push once  dextrose Oral Gel 15 Gram(s) Oral once PRN  enoxaparin Injectable 40 milliGRAM(s) SubCutaneous <User Schedule>  escitalopram 10 milliGRAM(s) Oral daily  glucagon  Injectable 1 milliGRAM(s) IntraMuscular once  hydrALAZINE 100 milliGRAM(s) Oral every 8 hours  insulin glargine Injectable (LANTUS) 42 Unit(s) SubCutaneous at bedtime  insulin lispro (ADMELOG) corrective regimen sliding scale   SubCutaneous three times a day before meals  insulin lispro (ADMELOG) corrective regimen sliding scale   SubCutaneous at bedtime  insulin lispro Injectable (ADMELOG) 26 Unit(s) SubCutaneous three times a day before meals  lactulose Syrup 15 Gram(s) Oral <User Schedule>  levothyroxine 88 MICROGram(s) Oral daily  pantoprazole   Suspension 40 milliGRAM(s) Oral daily  polyethylene glycol 3350 17 Gram(s) Oral at bedtime  polyethylene glycol 3350 17 Gram(s) Oral daily PRN  predniSONE   Tablet 40 milliGRAM(s) Oral two times a day  rosuvastatin 10 milliGRAM(s) Oral at bedtime  senna 2 Tablet(s) Oral at bedtime  sodium chloride 0.9% lock flush 10 milliLiter(s) IV Push every 1 hour PRN  sodium chloride 0.9%. 1000 milliLiter(s) IV Continuous <Continuous>  trimethoprim / sulfamethoxazole IVPB 320 milliGRAM(s) IV Intermittent every 8 hours  valACYclovir 500 milliGRAM(s) Oral every 12 hours  ----------------------------------------------------------------------------------------  PHYSICAL EXAM  Constitutional - Comfortable  HEENT - NCAT  Chest - Breathing comfortably, No wheezing  Abdomen - Soft   Extremities - No calf tenderness, Able to move bilateral UE and LE greater than antigravity in the bed.   Neurologic Exam -                    Cognitive - Awake, Alert     Communication - Fluent     Sensory - Decreased to light touch in bilateral LE and UE especially in the feet  Psychiatric - Mood is improved on 1/28 when compared to prior week  ----------------------------------------------------------------------------------------  ASSESSMENT/PLAN  69yMale with functional deficits after urosepsis with Klebsiella ESBL (Zosyn sensitive) and nasal corona virus.     #Bilateral LE pain (Left worse than Right)  -He endorsed pain with weight-bearing activities especially on the LLE last week. He has history of prior falls and prior left hip fracture s/p hip arthroplasty. He also has history of DLBCL. 1/23/25 Bilateral Hip X-ray showed no acute findings.   -He currently denies any significant pain and primary team doesn't feel like he is in pain. He was given an one time dose of 2.5 mg oxycodone on 1/22 but patient doesn't remember if it helped.     #Chemotherapy-induced peripheral neuropathy  -Patient currently denies any pain  -Previously discussed about oral and topical medications.     #Difficulty with mobility and ADLs  -Patient is on enoxaparin for DVT ppx  -This is multifactorial and could be secondary to being deconditioned after minimal mobility during hospitalization and cancer-related treatment  -Since patient is now participating in PT, interested in inpatient rehab and has a decrease in functional baseline for ADLs, recommend an OT consult   -Patient has anemia and this will need to be monitored. If his hemoglobin drops below 7 then therapy should be put on hold  -Continue bedside therapy as well as OOB throughout the day with mobilization by staff to maintain cardiopulmonary function and prevention of secondary complications related to debility.   -He was approved for Tafa/REV but given PCP Dx, his cancer-related treatment is on hold. He is on IV bactrim which was started on 1/24/25 with a plan for a 21 day course. While cancer-related treatment is on hold, if the patient ends up qualifying for inpatient rehab, IV bactrim will need to be check with the inpatient rehab facility to see if this can be accommodated. Once the patient restarts cancer-related treatment, the schedule will need to be reviewed to see if it can be accommodated at inpatient rehab.   -Patient's wife can not take care of him at home and his kids can not always be there since they work. Patient is not interested in a nursing home. Due to his prior history of multiple falls, he may not be safe at home by himself. He also doesn't want to return to the prior rehab facility because he said he was treated badly.   -Will continue to follow. Functional progress with PT and OT will determine ongoing rehab dispo recommendations, which may change.  -Discussed with the patient and the wife about following-up with one of our cancer rehab physicians upon discharge from inpatient rehab. I provided the wife with our office number and address. Patient may follow-up with me outpatient or another one of our cancer rehab physicians if that location is more convenient for them.   -I communicated with the primary team in person about today's encounter with the patient.     Spent a total of 35 minutes including preparing for the consult, obtaining history, performing physical exam, documenting and communicating with co-providers. Patient is a 69y old  Male who was admitted for chemotherapy but then had his treatment held due to fevers and found to have urosepsis with Klebsiella ESBL (Zosyn sensitive) and nasal corona virus.    HPI:  68 y/o M PMHx renal transplant 2012 (2/2 DM, s/p left nephrectomy), peripheral neuropathy, hypothyroidism, retroperitoneal fibrosis, HTN, HLD, GERD, anxiety/depression, ANGELIKA and recent admission at [facility] for sacral osteomyelitis and ESBL.coli urosepsis discharged on 12/18/24 to rehab. While admitted to [facility] was noted to have hypercalcemia and liver masses which were biopsied on 12/16/24, biopsy revealed DLBCL. Patient received R mini CHOP on 12/28 now admitted for cycle #2 Rmini CHOP, upon admission patient was febrile so chemotherapy was held. He was found to have urosepsis with Klebsiella ESBL (Zosyn sensitive), nasal corona virus and concern for PCP. Plan by primary team includes discussing treatment with Tafa/Rev since his current rehab facility will not allow for transport to outpatient oncology to treat his lymphoma.     He was seen by PT on 1/24/25 and patient declined therapy. PT evaluated the patient again in 1/28 and he participated. He needed a lot of assistance with moving from bed to chair and standing up from a chair using his arms. He need total assistance when walking in the hospital room and climbing 3-5 steps with a railing. He scored a 11 on the AM-PAC Functional Assessment: Basic Mobility. He hasn't been seen by OT.     He was approved for Tafa/REV but given PCP Dx, his therapy is on hold. He is on IV bactrim which was started on 1/24/25 with a plan for a 21day course.     I also spoke to his wife who was also in the room with the patient. Patient is motivated to try and participate more in therapy today. His wife is also interested in therapy upon discharge from the hospital. His wife states that the patient participated with therapy more today.     He currently denies any pain while in bed but endorses discomfort when ambulating. He was given an one time dose of 2.5 mg oxycodone on 1/22 but patient doesn't remember if it helped.     Endorses diffuse weakness especially in the lower extremities.     Imaging reviewed showed:  1/23/25 CT C/A/P:     BONES: Diffuse erosive changes of the sacrum with soft tissue   infiltration, similar in appearance to prior. Sclerotic focus in the left   anterolateral seventh rib. Chronic rib fractures. Chronic left humeral   fracture. Left hip replacement.    IMPRESSION:    Diffuse bilateral patchy groundglass opacities with central predominance,   increased from 1/17/2025, may represent edema or pneumonia.  Unchanged left lower lobe round atelectasis.  Small left pleural effusion.  Unchanged retroperitoneal soft tissue encasing the aorta and IVC.  Diffuse erosive changes of the sacrum with soft tissue infiltration,   similar in appearance to prior, which could represent osteomyelitis.    1/23/25 Bilateral Hip X-ray:  Postoperative Changes: Cemented left hip hemiarthroplasty is again   seen. Slight lucency is again seen at the bone cement interface of the   medial intertrochanteric region. There is mild adjacent heterotopic   ossification.    Joint Space(s):  Right hip and sacroiliac joints appear maintained.    OTHER FINDINGS:  Severe atherosclerotic calcifications are present.   Pelvic surgical clips are.    IMPRESSION:  1.  No acute osseous abnormalities.  2.  Sacral insufficiency fractures are better seen on CT.    REVIEW OF SYSTEMS: Negative unless stated in the HPI    VITALS  T(C): 37.2 (01-28-25 @ 09:49), Max: 37.2 (01-28-25 @ 09:49)  HR: 72 (01-28-25 @ 13:01) (65 - 81)  BP: 124/76 (01-28-25 @ 09:49) (124/76 - 169/77)  RR: 18 (01-28-25 @ 09:49) (17 - 20)  SpO2: 93% (01-28-25 @ 13:01) (93% - 98%)    PAST MEDICAL & SURGICAL HISTORY  Diabetes Mellitus Type II  ESRD on Dialysis  GERD (gastroesophageal reflux disease)  Depression  Hypothyroidism  Hyperlipidemia  Kidney transplanted  Hypertension  ANGELIKA (obstructive sleep apnea)  Peripheral neuropathy  Shoulder fracture  Hypothyroidism  History of renal transplant  DLBCL (diffuse large B cell lymphoma)  S/P kidney transplant  S/P cholecystectomy  Retroperitoneal fibrosis  AV fistula  S/P right cataract extraction  S/P left cataract extraction  H/O unilateral nephrectomy    SOCIAL HISTORY - as per documentation/history    FUNCTIONAL HISTORY:  He lives in home with his wife who has MS. He said she is unable to take care of him. His son and daughter live nearby but they work and unable to watch him. He says they stay on the first floor and there are no stairs to enter. He has a wheelchair and a walker at home. The wheelchair can not fit into the bathroom. He is unsure of when he last walked. He says he moved minimally out of his bed when in the other hospital and in rehab. He needs assistance with ADLs.    CURRENT FUNCTIONAL STATUS:  Based on 1/28 PT notes: He needed a lot of assistance with moving from bed to chair and standing up from a chair using his arms. He need total assistance when walking in the hospital room and climbing 3-5 steps with a railing. He scored a 11 on the AM-PAC Functional Assessment: Basic Mobility. He hasn't been seen by OT.     FAMILY HISTORY   MI (myocardial infarction)  Family history of obesity (Sibling)    RECENT LABS - Reviewed  CBC Full  -  ( 28 Jan 2025 07:36 )  WBC Count : 10.57 K/uL  RBC Count : 2.62 M/uL  Hemoglobin : 7.7 g/dL  Hematocrit : 23.5 %  Platelet Count - Automated : 440 K/uL  Mean Cell Volume : 89.7 fl  Mean Cell Hemoglobin : 29.4 pg  Mean Cell Hemoglobin Concentration : 32.8 g/dL  Auto Neutrophil # : 10.18 K/uL  Auto Lymphocyte # : 0.00 K/uL  Auto Monocyte # : 0.30 K/uL  Auto Eosinophil # : 0.00 K/uL  Auto Basophil # : 0.00 K/uL  Auto Neutrophil % : 93.5 %  Auto Lymphocyte % : 0.0 %  Auto Monocyte % : 2.8 %  Auto Eosinophil % : 0.0 %  Auto Basophil % : 0.0 %    01-28    132[L]  |  99  |  34[H]  ----------------------------<  203[H]  5.8[H]   |  22  |  1.12    Ca    9.8      28 Jan 2025 07:34  Phos  2.0     01-28  Mg     1.8     01-28    TPro  5.3[L]  /  Alb  2.4[L]  /  TBili  0.2  /  DBili  x   /  AST  39  /  ALT  31  /  AlkPhos  88  01-28    Urinalysis Basic - ( 28 Jan 2025 07:34 )    Color: x / Appearance: x / SG: x / pH: x  Gluc: 203 mg/dL / Ketone: x  / Bili: x / Urobili: x   Blood: x / Protein: x / Nitrite: x   Leuk Esterase: x / RBC: x / WBC x   Sq Epi: x / Non Sq Epi: x / Bacteria: x    ALLERGIES  No Known Allergies    MEDICATIONS   acetaminophen     Tablet .. 650 milliGRAM(s) Oral every 6 hours PRN  amLODIPine   Tablet 10 milliGRAM(s) Oral daily  artificial tears (preservative free) Ophthalmic Solution 1 Drop(s) Both EYES every 6 hours  buPROPion XL (24-Hour) . 300 milliGRAM(s) Oral daily  carvedilol 12.5 milliGRAM(s) Oral every 12 hours  chlorhexidine 4% Liquid 1 Application(s) Topical <User Schedule>  cloNIDine 0.1 milliGRAM(s) Oral three times a day  dextrose 5%. 1000 milliLiter(s) IV Continuous <Continuous>  dextrose 5%. 1000 milliLiter(s) IV Continuous <Continuous>  dextrose 50% Injectable 25 Gram(s) IV Push once  dextrose Oral Gel 15 Gram(s) Oral once PRN  enoxaparin Injectable 40 milliGRAM(s) SubCutaneous <User Schedule>  escitalopram 10 milliGRAM(s) Oral daily  glucagon  Injectable 1 milliGRAM(s) IntraMuscular once  hydrALAZINE 100 milliGRAM(s) Oral every 8 hours  insulin glargine Injectable (LANTUS) 42 Unit(s) SubCutaneous at bedtime  insulin lispro (ADMELOG) corrective regimen sliding scale   SubCutaneous three times a day before meals  insulin lispro (ADMELOG) corrective regimen sliding scale   SubCutaneous at bedtime  insulin lispro Injectable (ADMELOG) 26 Unit(s) SubCutaneous three times a day before meals  lactulose Syrup 15 Gram(s) Oral <User Schedule>  levothyroxine 88 MICROGram(s) Oral daily  pantoprazole   Suspension 40 milliGRAM(s) Oral daily  polyethylene glycol 3350 17 Gram(s) Oral at bedtime  polyethylene glycol 3350 17 Gram(s) Oral daily PRN  predniSONE   Tablet 40 milliGRAM(s) Oral two times a day  rosuvastatin 10 milliGRAM(s) Oral at bedtime  senna 2 Tablet(s) Oral at bedtime  sodium chloride 0.9% lock flush 10 milliLiter(s) IV Push every 1 hour PRN  sodium chloride 0.9%. 1000 milliLiter(s) IV Continuous <Continuous>  trimethoprim / sulfamethoxazole IVPB 320 milliGRAM(s) IV Intermittent every 8 hours  valACYclovir 500 milliGRAM(s) Oral every 12 hours  ----------------------------------------------------------------------------------------  PHYSICAL EXAM  Constitutional - Comfortable  HEENT - NCAT  Chest - Breathing comfortably, No wheezing  Abdomen - Soft   Extremities - No calf tenderness, Able to move bilateral UE and LE greater than antigravity in the bed.   Neurologic Exam -                    Cognitive - Awake, Alert     Communication - Fluent     Sensory - Decreased to light touch in bilateral LE and UE especially in the feet  Psychiatric - Mood is improved on 1/28 when compared to prior week  ----------------------------------------------------------------------------------------  ASSESSMENT/PLAN  69yMale with functional deficits after urosepsis with Klebsiella ESBL (Zosyn sensitive) and nasal corona virus.     #Bilateral LE pain (Left worse than Right)  -He endorsed pain with weight-bearing activities especially on the LLE last week. He has history of prior falls and prior left hip fracture s/p hip arthroplasty. He also has history of DLBCL. 1/23/25 Bilateral Hip X-ray showed no acute findings.   -He currently denies any significant pain and primary team doesn't feel like he is in pain. He was given an one time dose of 2.5 mg oxycodone on 1/22 but patient doesn't remember if it helped.     #Chemotherapy-induced peripheral neuropathy  -Patient currently denies any pain  -Previously discussed about oral and topical medications.     #Difficulty with mobility and ADLs  -Patient is on enoxaparin for DVT ppx  -This is multifactorial and could be secondary to being deconditioned after minimal mobility during hospitalization and cancer-related treatment  -Since patient is now participating in PT, interested in inpatient rehab and has a decrease in functional baseline for ADLs, recommend an OT consult   -Patient has anemia and this will need to be monitored. If his hemoglobin drops below 7 then therapy should be put on hold  -Continue bedside therapy as well as OOB throughout the day with mobilization by staff to maintain cardiopulmonary function and prevention of secondary complications related to debility.   -He was approved for Tafa/REV but given PCP Dx, his cancer-related treatment is on hold. He is on IV bactrim which was started on 1/24/25 with a plan for a 21 day course. While cancer-related treatment is on hold, if the patient ends up qualifying for inpatient rehab, IV bactrim will need to be checked with the inpatient rehab facility to see if this can be accommodated. Once the patient restarts cancer-related treatment, the schedule will need to be reviewed to see if it can be accommodated at inpatient rehab.   -Patient's wife can not take care of him at home and his kids can not always be there since they work. Patient is not interested in a nursing home. Due to his prior history of multiple falls, he may not be safe at home by himself. He also doesn't want to return to the prior rehab facility because he said he was treated badly.   -Will continue to follow. Functional progress with PT and OT will determine ongoing rehab dispo recommendations, which may change.  -Discussed with the patient and the wife about following-up with one of our cancer rehab physicians upon discharge from inpatient rehab. I provided the wife with our office number and address. Patient may follow-up with me outpatient or another one of our cancer rehab physicians if that location is more convenient for them.   -I communicated with the primary team in person about today's encounter with the patient but this was before I saw the new PT note for today.     Spent a total of 35 minutes including preparing for the consult, obtaining history, performing physical exam, documenting and communicating with co-providers.

## 2025-01-28 NOTE — PROGRESS NOTE ADULT - ASSESSMENT
70 y/o M PMHx renal transplant 2012 (2/2 DM, s/p left nephrectomy), peripheral neuropathy, hypothyroidism, retroperitoneal fibrosis, HTN, HLD, GERD, anxiety/depression, ANGELIKA and recent admission at [facility] for sacral osteomyelitis and ESBL.coli urosepsis discharged on 12/18/24 to rehab. While admitted to [facility] was noted to have hypercalcemia and liver masses which were biopsied on 12/16/24, biopsy revealed DLBCL. Patient received R mini CHOP on 12/28 now admitted for cycle #2 R-mini CHOP, upon admission patient is febrile and Chemotherapy is on HOLD. Pt has been pancytopenic secondary to chemotherapy as well as infection. Currently undergoing workup or suspected PCP/PJC pneumonia.

## 2025-01-28 NOTE — PROGRESS NOTE ADULT - PROBLEM SELECTOR PLAN 2
Not neutropenic. febrile   1/17- F/u Flu/COVID PCR , + for Coronavirus   (last hospitalization: osteomyelitis and E coli urosepsis (12/1/24 - 12/18/24)   [facility] (12/18/24): for osteomyelitis & E coli urosepsis).  1/18 - Oral candidiasis - Nystatin S/S  1/19- ID consult, followed by [facility] ID, Zosyn changed to Ertapenem  history of ESBL Kleb in Ucx 12/31/24, ESBL E.coli 11/29/24 Ucx  1/24 - CT chest with c/f new PCP pneumonia as well as unchanged sacral OM. ID recs DC IV vanc, switch IV erta to IV mick given hypoalbuminemia efficacy concerns and start IV bactrim for empiric PCP PNA treatment as well as steroid taper pred 40mg BID x5 days followed by pred 40mg QD x5d then 20mg QD.   1/24 fungitell high > 500; pending galactoman, beta D glucan  1/26- D/[facility] Meropenem, not neutropenic  1/27 cont IV Bactrim for PCP PNA, valtrex ppx Not neutropenic. afebrile   1/17- F/u Flu/COVID PCR , + for Coronavirus   (last hospitalization: osteomyelitis and E coli urosepsis (12/1/24 - 12/18/24)   [facility] (12/18/24): for osteomyelitis & E coli urosepsis).  1/18 - Oral candidiasis - Nystatin S/S  1/19- ID consult, followed by [facility] ID, Zosyn changed to Ertapenem  history of ESBL Kleb in Ucx 12/31/24, ESBL E.coli 11/29/24 Ucx  1/24 - CT chest with c/f new PCP pneumonia as well as unchanged sacral OM. ID recs DC IV vanc, switch IV erta to IV mick given hypoalbuminemia efficacy concerns and start IV bactrim for empiric PCP PNA treatment as well as steroid taper pred 40mg BID x5 days followed by pred 40mg QD x5d then 20mg QD.   1/24 fungitell high > 500; pending galactoman, beta D glucan  1/26- D/[facility]le Meropenem, not neutropenic  1/27 cont IV Bactrim for PCP PNA, valtrex ppx

## 2025-01-28 NOTE — PROGRESS NOTE ADULT - PROBLEM SELECTOR PLAN 12
DVT prophylaxis, Lovenox 40mg s/c daily.  Encourage ambulation.  PT consult.  Code status: full code, Dr. Garcia explained to wife (Ludmila) who is the HCP about patient's current health status and prognosis, options for code status was explained. His HCP expressed that she would like Pier[facility] to continue all life sustaining treatments therefore patient will remain as FULL CODE.

## 2025-01-28 NOTE — PROGRESS NOTE ADULT - SUBJECTIVE AND OBJECTIVE BOX
Chief Complaint: Diabetes Mellitus follow up    INTERVAL HX:  Patient seen at bedside. He continues on HFNC.   Reports eating well, finishes 100% of food on each tray.   BG over the last 24 hrs have been hyperglycemic to the 300s.  Goal range 100-180mg/dl. No hypoglycemia.     Review of Systems:  General: As above  GI: No nausea, vomiting  Endocrine: no  S&Sx of hypoglycemia    Allergies    No Known Allergies    Intolerances      MEDICATIONS  (STANDING):  amLODIPine   Tablet 10 milliGRAM(s) Oral daily  artificial tears (preservative free) Ophthalmic Solution 1 Drop(s) Both EYES every 6 hours  buPROPion XL (24-Hour) . 300 milliGRAM(s) Oral daily  carvedilol 12.5 milliGRAM(s) Oral every 12 hours  chlorhexidine 4% Liquid 1 Application(s) Topical <User Schedule>  cloNIDine 0.1 milliGRAM(s) Oral three times a day  dextrose 5%. 1000 milliLiter(s) (100 mL/Hr) IV Continuous <Continuous>  dextrose 5%. 1000 milliLiter(s) (50 mL/Hr) IV Continuous <Continuous>  dextrose 50% Injectable 25 Gram(s) IV Push once  enoxaparin Injectable 40 milliGRAM(s) SubCutaneous <User Schedule>  escitalopram 10 milliGRAM(s) Oral daily  glucagon  Injectable 1 milliGRAM(s) IntraMuscular once  hydrALAZINE 100 milliGRAM(s) Oral every 8 hours  insulin glargine Injectable (LANTUS) 42 Unit(s) SubCutaneous at bedtime  insulin lispro (ADMELOG) corrective regimen sliding scale   SubCutaneous three times a day before meals  insulin lispro (ADMELOG) corrective regimen sliding scale   SubCutaneous at bedtime  insulin lispro Injectable (ADMELOG) 26 Unit(s) SubCutaneous three times a day before meals  lactulose Syrup 15 Gram(s) Oral <User Schedule>  levothyroxine 88 MICROGram(s) Oral daily  pantoprazole   Suspension 40 milliGRAM(s) Oral daily  polyethylene glycol 3350 17 Gram(s) Oral at bedtime  predniSONE   Tablet 40 milliGRAM(s) Oral two times a day  rosuvastatin 10 milliGRAM(s) Oral at bedtime  senna 2 Tablet(s) Oral at bedtime  sodium chloride 0.9%. 1000 milliLiter(s) (50 mL/Hr) IV Continuous <Continuous>  trimethoprim / sulfamethoxazole IVPB 320 milliGRAM(s) IV Intermittent every 8 hours  valACYclovir 500 milliGRAM(s) Oral every 12 hours        insulin glargine Injectable (LANTUS)   42 Unit(s) SubCutaneous (01-27-25 @ 21:52)    insulin lispro (ADMELOG) corrective regimen sliding scale   4 Unit(s) SubCutaneous (01-28-25 @ 13:25)   4 Unit(s) SubCutaneous (01-28-25 @ 09:15)   10 Unit(s) SubCutaneous (01-27-25 @ 17:43)    insulin lispro (ADMELOG) corrective regimen sliding scale   4 Unit(s) SubCutaneous (01-27-25 @ 21:51)    insulin lispro Injectable (ADMELOG)   26 Unit(s) SubCutaneous (01-28-25 @ 13:26)   26 Unit(s) SubCutaneous (01-28-25 @ 09:16)   26 Unit(s) SubCutaneous (01-27-25 @ 17:44)    levothyroxine   88 MICROGram(s) Oral (01-28-25 @ 05:09)    predniSONE   Tablet   40 milliGRAM(s) Oral (01-28-25 @ 05:08)   40 milliGRAM(s) Oral (01-27-25 @ 17:45)    rosuvastatin   10 milliGRAM(s) Oral (01-27-25 @ 21:18)        PHYSICAL EXAM:  VITALS: T(C): 37.2 (01-28-25 @ 09:49)  T(F): 99 (01-28-25 @ 09:49), Max: 99 (01-28-25 @ 09:49)  HR: 72 (01-28-25 @ 13:01) (65 - 73)  BP: 124/76 (01-28-25 @ 09:49) (124/76 - 169/77)  RR:  (17 - 20)  SpO2:  (93% - 98%)  Wt(kg): --  GENERAL: NAD  Respiratory: Respirations unlabored   Extremities: Warm, no edema  NEURO: Alert , appropriate     LABS:  POCT Blood Glucose.: 223 mg/dL (01-28-25 @ 12:31)  POCT Blood Glucose.: 219 mg/dL (01-28-25 @ 09:07)  POCT Blood Glucose.: 319 mg/dL (01-27-25 @ 21:48)  POCT Blood Glucose.: 382 mg/dL (01-27-25 @ 17:25)  POCT Blood Glucose.: 389 mg/dL (01-27-25 @ 13:03)  POCT Blood Glucose.: 408 mg/dL (01-27-25 @ 13:00)  POCT Blood Glucose.: 363 mg/dL (01-27-25 @ 09:41)  POCT Blood Glucose.: 238 mg/dL (01-26-25 @ 22:40)  POCT Blood Glucose.: 361 mg/dL (01-26-25 @ 17:02)  POCT Blood Glucose.: 381 mg/dL (01-26-25 @ 13:33)  POCT Blood Glucose.: 453 mg/dL (01-26-25 @ 08:38)  POCT Blood Glucose.: 440 mg/dL (01-26-25 @ 08:36)  POCT Blood Glucose.: 394 mg/dL (01-26-25 @ 01:50)  POCT Blood Glucose.: 418 mg/dL (01-25-25 @ 21:04)  POCT Blood Glucose.: 456 mg/dL (01-25-25 @ 17:06)  POCT Blood Glucose.: 479 mg/dL (01-25-25 @ 16:59)                          7.7    10.57 )-----------( 440      ( 28 Jan 2025 07:36 )             23.5     01-28    132[L]  |  99  |  34[H]  ----------------------------<  203[H]  5.8[H]   |  22  |  1.12    Ca    9.8      28 Jan 2025 07:34  Phos  2.0     01-28  Mg     1.8     01-28    TPro  5.3[L]  /  Alb  2.4[L]  /  TBili  0.2  /  DBili  x   /  AST  39  /  ALT  31  /  AlkPhos  88  01-28    eGFR: 72 mL/min/1.73m2 (28 Jan 2025 07:34)      Thyroid Function Tests:  12-31 @ 07:04 TSH 0.55 FreeT4 1.2 T3 -- Anti TPO -- Anti Thyroglobulin Ab -- TSI --  12-30 @ 09:53 TSH -- FreeT4 1.3 T3 -- Anti TPO -- Anti Thyroglobulin Ab -- TSI --      A1C with Estimated Average Glucose Result: 7.8 % (12-29-24 @ 07:07)  A1C with Estimated Average Glucose Result: 7.7 % (12-28-24 @ 10:06)  A1C with Estimated Average Glucose Result: 7.4 % (11-30-24 @ 06:40)    Estimated Average Glucose: 177 mg/dL (12-29-24 @ 07:07)  Estimated Average Glucose: 174 mg/dL (12-28-24 @ 10:06)  Estimated Average Glucose: 166 mg/dL (11-30-24 @ 06:40)        Diet, Consistent Carbohydrate/No Snacks:   Supplement Feeding Modality:  Oral  Glucerna Shake Cans or Servings Per Day:  1       Frequency:  Daily (01-26-25 @ 23:00) [Active]

## 2025-01-28 NOTE — PROGRESS NOTE ADULT - PROBLEM SELECTOR PLAN 5
AT RISK FOR ADRENAL INSUFFIENCEY IF HYPOTENSIVE 50mg hydrocortisone IV q8  s/p Hydrocortisone 25 mg IV, now off--on pred taper for PJP PNA  1/26 D/C Tacrolimus and Lisinopril due to DIMITRIOS and PCP.   Transplant nephro / Transplant ID following consult AT RISK FOR ADRENAL INSUFFIENCEY IF HYPOTENSIVE 50mg hydrocortisone IV q8  s/p Hydrocortisone 25 mg IV, now off--on pred taper for PJP PNA  1/26 D/C Tacrolimus and Lisinopril due to DIMITRIOS and PCP.   Transplant nephro / Transplant ID following

## 2025-01-28 NOTE — PROGRESS NOTE ADULT - ASSESSMENT
Patient is a 69 year old male with PMH of renal transplant 2012 (2/2 DM, s/p left nephrectomy), peripheral neuropathy, hypothyroidism, retroperitoneal fibrosis, HTN, HLD, GERD, anxiety/depression, ANGELIKA and recent admission at [facility] for ESBL E.coli urosepsis, imaging with ?sacral OM though pt with no sacral wound suspected findings likely related to mets, he was discharged on 12/18/24 to rehab, recently diagnosed DLBCL while admitted to [place] [facility] when he was noted to have hypercalcemia and liver masses s/p biopsy 12/16/24, now s/p R mini CHOP on 12/28 who was admitted 1/17 for cycle #2 Rmini CHOP, upon admission patient febrile and chemotherapy was held for now.  Prior cultures reviewed, history of ESBL Klebsiella in Ucx 12/31/24, ESBL E.coli 11/29/24 Ucx     Fevers, worsening hypoxia, c/f PJP   Viral URI d/t coronavirus (not COVID), treated for possible bacterial pneumonia  - 1/17 RVP with Coronavirus (229E,HKU1,NL63,OC43) detected   - 1/17 CT Chest with extensive new b/l ground glass nodular opacities, upper lobe dominant - possible pneumonia   - 1/17 CTAP decreased R hepatic mass since 12/9/24; known splenic mass; changes likely c/w retroperitoneal fibrosis   - UA with some pyuria, no bacteria, Ucx with >3 organisms - suspect contaminated specimen    - repeat Ucx both with low colony count of yeasts/C.albicans, likely contaminant, no need to treat  - LUE PICC line with no sign of infection, no sign of SSTI, no rashes   - wound care eval noted for sacral and ischial DTI, no open wound noted  - MRSA PCR screen negative (+MSSA)  - s/p zosyn 1/17-1/20, escalated to ertapenem 1/20pm due to fevers but continued to have fevers and then worsening hypoxia   - 1/23 CTAP with no significant changes, diffuse erosive changes in sacrum with soft tissue infiltration similar to prior, low suspicion for OM given no open wound in area  - 1/23 CT Chest with diffuse b/l patchy ggo with central predominance increased from 1/17/25 - edema vs pneumonia; unchanged LLL round atelectasis; small L pleural effusion   - imaging reviewed, c/f PCP based on repeat CT, ongoing fevers, worsening hypoxia now requiring HFNC, LDH elevated/increased, was on steroids without ppx and iso malignancy, noted vancomycin added overnight, s/p hydrocortisone 1/22-1/23   - IP eval appreciated, pt prefers to avoid bronch d/t risk of difficulty weaning from vent  - 1/17, 1/19, 1/21, 1/24 Bcx remain NGTD  1/25 afebrile x 24 hours, ua negative, EBV serology c/w past infection, CMV PCR negative    1/26 fungitell > 500 - c/w suspicion for PJP  1/28 afebrile >48h now, WBC stable, remains on HFNC    s/p zosyn 1/17-1/20  s/p ertapenem 1/20- 1/24  s/p vanc x1 on 1/24  s/p meropenem 1/24-1/26   started bactrim + prednisone 1/24-    Recommendations:   Send sputum for PCP PCR if able to expectorate   Interventional Pulm following   Follow aspergillus ag, in process   Continue Bactrim 320mg IV Q8h (based on pts weight) - plan for 21d course   Continue Prednisone 40mg PO BID x5d until 1/28 then 40mg daily x5d (1/29-2/2), then 20mg daily for 11 days (2/3-2/13)  Nephrology following, monitor renal function closely    Continue on valtrex ppx  Monitor temps/CBC  Supportive care  Aspiration precautions  Wound care, offloading as able, nutrition  Continue rest of care per primary team     Risa Ac M.D.  [facility]  Available on [employer] TEAMS - *PREFERRED*  [phone]  After 5pm on weekdays and all day on weekends - please call [phone]     Thank you for consulting us and involving us in the management of this patients case. In addition to reviewing history, imaging, documents, labs, microbiology, took into account antibiotic stewardship, local antibiogram and infection control strategies and potential transmission issues.

## 2025-01-28 NOTE — PROGRESS NOTE ADULT - SUBJECTIVE AND OBJECTIVE BOX
JAN CALVIN  69y  MRN: [id number]    Patient is a 69y old  Male who presents with a chief complaint of "For chemo" (28 Jan 2025 13:34)      Subjective: Patient examined at bedside. No acute events overnight. Denies fever, CP, SOB, abn pain, N/V, dysuria. Tolerating diet.      MEDICATIONS  (STANDING):  amLODIPine   Tablet 10 milliGRAM(s) Oral daily  artificial tears (preservative free) Ophthalmic Solution 1 Drop(s) Both EYES every 6 hours  buPROPion XL (24-Hour) . 300 milliGRAM(s) Oral daily  carvedilol 12.5 milliGRAM(s) Oral every 12 hours  chlorhexidine 4% Liquid 1 Application(s) Topical <User Schedule>  cloNIDine 0.1 milliGRAM(s) Oral three times a day  dextrose 5%. 1000 milliLiter(s) (50 mL/Hr) IV Continuous <Continuous>  dextrose 5%. 1000 milliLiter(s) (100 mL/Hr) IV Continuous <Continuous>  dextrose 50% Injectable 25 Gram(s) IV Push once  enoxaparin Injectable 40 milliGRAM(s) SubCutaneous <User Schedule>  escitalopram 10 milliGRAM(s) Oral daily  glucagon  Injectable 1 milliGRAM(s) IntraMuscular once  hydrALAZINE 100 milliGRAM(s) Oral every 8 hours  insulin glargine Injectable (LANTUS) 46 Unit(s) SubCutaneous at bedtime  insulin lispro (ADMELOG) corrective regimen sliding scale   SubCutaneous three times a day before meals  insulin lispro (ADMELOG) corrective regimen sliding scale   SubCutaneous at bedtime  insulin lispro Injectable (ADMELOG) 30 Unit(s) SubCutaneous three times a day before meals  lactulose Syrup 15 Gram(s) Oral <User Schedule>  levothyroxine 88 MICROGram(s) Oral daily  pantoprazole   Suspension 40 milliGRAM(s) Oral daily  polyethylene glycol 3350 17 Gram(s) Oral at bedtime  predniSONE   Tablet 40 milliGRAM(s) Oral two times a day  rosuvastatin 10 milliGRAM(s) Oral at bedtime  senna 2 Tablet(s) Oral at bedtime  sodium chloride 0.9%. 1000 milliLiter(s) (50 mL/Hr) IV Continuous <Continuous>  trimethoprim / sulfamethoxazole IVPB 320 milliGRAM(s) IV Intermittent every 8 hours  valACYclovir 500 milliGRAM(s) Oral every 12 hours    MEDICATIONS  (PRN):  acetaminophen     Tablet .. 650 milliGRAM(s) Oral every 6 hours PRN Temp greater or equal to 38C (100.4F), Mild Pain (1 - 3)  dextrose Oral Gel 15 Gram(s) Oral once PRN Blood Glucose LESS THAN 70 milliGRAM(s)/deciliter  polyethylene glycol 3350 17 Gram(s) Oral daily PRN for constipation  sodium chloride 0.9% lock flush 10 milliLiter(s) IV Push every 1 hour PRN Pre/post blood products, medications, blood draw, and to maintain line patency      Objective:    Vitals: Vital Signs Last 24 Hrs  T(C): 36.7 (01-28-25 @ 13:20), Max: 37.2 (01-28-25 @ 09:49)  T(F): 98 (01-28-25 @ 13:20), Max: 99 (01-28-25 @ 09:49)  HR: 70 (01-28-25 @ 13:20) (65 - 73)  BP: 129/65 (01-28-25 @ 13:20) (124/76 - 169/77)  BP(mean): --  RR: 18 (01-28-25 @ 13:20) (17 - 19)  SpO2: 95% (01-28-25 @ 13:20) (93% - 98%)            I&O's Summary    27 Jan 2025 07:01  -  28 Jan 2025 07:00  --------------------------------------------------------  IN: 3479 mL / OUT: 2750 mL / NET: 729 mL    28 Jan 2025 07:01  -  28 Jan 2025 15:43  --------------------------------------------------------  IN: 480 mL / OUT: 1500 mL / NET: -1020 mL        PHYSICAL EXAM:  GENERAL: No acute distress, resting comfortably in bed  HEAD:  Atraumatic, Normocephalic  EYES: EOMI, conjunctiva and sclera clear  CHEST/LUNG: Clear to auscultation bilaterally; +crackles b/l, no rhonchi, no wheezing, or rubs  HEART: Regular rate and rhythm; No murmurs, rubs, or gallops  ABDOMEN: Soft, Nontender, Nondistended; normal bowel sounds   SKIN: No rashes or lesions  NERVOUS SYSTEM:  Alert & Oriented X3, no focal deficits    LABS:  01-28    132[L]  |  99  |  34[H]  ----------------------------<  203[H]  5.8[H]   |  22  |  1.12  01-27    127[L]  |  94[L]  |  35[H]  ----------------------------<  301[H]  5.3   |  22  |  0.87  01-26    126[L]  |  95[L]  |  36[H]  ----------------------------<  366[H]  5.5[H]   |  21[L]  |  1.01    Ca    9.8      28 Jan 2025 07:34  Ca    9.5      27 Jan 2025 07:00  Ca    9.8      26 Jan 2025 17:42  Phos  2.0     01-28  Mg     1.8     01-28    TPro  5.3[L]  /  Alb  2.4[L]  /  TBili  0.2  /  DBili  x   /  AST  39  /  ALT  31  /  AlkPhos  88  01-28  TPro  5.3[L]  /  Alb  2.4[L]  /  TBili  0.2  /  DBili  x   /  AST  37  /  ALT  36  /  AlkPhos  89  01-27  TPro  5.6[L]  /  Alb  2.5[L]  /  TBili  0.1[L]  /  DBili  x   /  AST  35  /  ALT  37  /  AlkPhos  96  01-26      PT/INR - ( 28 Jan 2025 07:38 )   PT: 10.9 sec;   INR: 0.96 ratio         PTT - ( 28 Jan 2025 07:38 )  PTT:27.6 sec              Urinalysis Basic - ( 28 Jan 2025 07:34 )    Color: x / Appearance: x / SG: x / pH: x  Gluc: 203 mg/dL / Ketone: x  / Bili: x / Urobili: x   Blood: x / Protein: x / Nitrite: x   Leuk Esterase: x / RBC: x / WBC x   Sq Epi: x / Non Sq Epi: x / Bacteria: x                              7.7    10.57 )-----------( 440      ( 28 Jan 2025 07:36 )             23.5                         7.8    8.17  )-----------( 478      ( 27 Jan 2025 06:58 )             24.0                         8.1    11.69 )-----------( 552      ( 26 Jan 2025 06:53 )             24.7     CAPILLARY BLOOD GLUCOSE      POCT Blood Glucose.: 223 mg/dL (28 Jan 2025 12:31)  POCT Blood Glucose.: 219 mg/dL (28 Jan 2025 09:07)  POCT Blood Glucose.: 319 mg/dL (27 Jan 2025 21:48)  POCT Blood Glucose.: 382 mg/dL (27 Jan 2025 17:25)      RADIOLOGY & ADDITIONAL TESTS:    Imaging Personally Reviewed:  [x ] YES  [ ] NO    Consultants involved in case:   Consultant(s) Notes Reviewed:  [ x] YES  [ ] NO:   Care Discussed with Consultants/Other Providers [x ] YES  [ ] NO

## 2025-01-28 NOTE — PROGRESS NOTE ADULT - PROBLEM SELECTOR PLAN 1
Admitted  for R mini CHOP, found to be febrile on admission hold  chemo   Monitor CBC with diff, transfuse as needed.  Monitor electrolytes, replete as needed.  Daily weights.Strict I/O. mouth care  Plans for any chemo or tafasitamab/lenalidomide are now ON HOLD given severe infections and fevers.  1/25 stable on HFNC. cont to hold planned therapy due to suspected PCP/PJC pneumonia  1/27 stable on HFNC, O2 requirements less, now 50% FiO2 and 50 LPM. No further plan for chemo and/or immunotherapy at present. Request made to transfer to medicine floor with Hospitalist coverage - accepted by  1/28 Medicine service Doug Sanchez will take over care when pt moved to [address] [place] [address].   Trasplant ID consulted at recommendation by Transplant Nephro. Admitted under heme onc service for R mini CHOP, found to be febrile on admission, plans for any chemo or tafasitamab/lenalidomide are now ON HOLD iso AHRF and c/f PJP PNA  Monitor CBC with diff, transfuse as needed to maintain Hb >7  Monitor electrolytes, replete as needed.  Daily weights.Strict I/O. mouth care

## 2025-01-29 ENCOUNTER — RESULT REVIEW (OUTPATIENT)
Age: 70
End: 2025-01-29

## 2025-01-29 DIAGNOSIS — E11.65 TYPE 2 DIABETES MELLITUS WITH HYPERGLYCEMIA: ICD-10-CM

## 2025-01-29 LAB
A1C WITH ESTIMATED AVERAGE GLUCOSE RESULT: 8.4 % — HIGH (ref 4–5.6)
ALBUMIN SERPL ELPH-MCNC: 2.3 G/DL — LOW (ref 3.3–5)
ALP SERPL-CCNC: 87 U/L — SIGNIFICANT CHANGE UP (ref 40–120)
ALT FLD-CCNC: 30 U/L — SIGNIFICANT CHANGE UP (ref 10–45)
ANION GAP SERPL CALC-SCNC: 7 MMOL/L — SIGNIFICANT CHANGE UP (ref 5–17)
AST SERPL-CCNC: 35 U/L — SIGNIFICANT CHANGE UP (ref 10–40)
BASOPHILS # BLD AUTO: 0.01 K/UL — SIGNIFICANT CHANGE UP (ref 0–0.2)
BASOPHILS NFR BLD AUTO: 0.1 % — SIGNIFICANT CHANGE UP (ref 0–2)
BILIRUB SERPL-MCNC: 0.1 MG/DL — LOW (ref 0.2–1.2)
BLD GP AB SCN SERPL QL: NEGATIVE — SIGNIFICANT CHANGE UP
BUN SERPL-MCNC: 38 MG/DL — HIGH (ref 7–23)
CALCIUM SERPL-MCNC: 9.9 MG/DL — SIGNIFICANT CHANGE UP (ref 8.4–10.5)
CHLORIDE SERPL-SCNC: 100 MMOL/L — SIGNIFICANT CHANGE UP (ref 96–108)
CO2 SERPL-SCNC: 22 MMOL/L — SIGNIFICANT CHANGE UP (ref 22–31)
CREAT SERPL-MCNC: 1.05 MG/DL — SIGNIFICANT CHANGE UP (ref 0.5–1.3)
CULTURE RESULTS: SIGNIFICANT CHANGE UP
CULTURE RESULTS: SIGNIFICANT CHANGE UP
EGFR: 78 ML/MIN/1.73M2 — SIGNIFICANT CHANGE UP
EOSINOPHIL # BLD AUTO: 0 K/UL — SIGNIFICANT CHANGE UP (ref 0–0.5)
EOSINOPHIL NFR BLD AUTO: 0 % — SIGNIFICANT CHANGE UP (ref 0–6)
ESTIMATED AVERAGE GLUCOSE: 194 MG/DL — HIGH (ref 68–114)
GALACTOMANNAN AG SERPL-ACNC: 0.05 INDEX — SIGNIFICANT CHANGE UP (ref 0–0.49)
GLUCOSE BLDC GLUCOMTR-MCNC: 158 MG/DL — HIGH (ref 70–99)
GLUCOSE BLDC GLUCOMTR-MCNC: 207 MG/DL — HIGH (ref 70–99)
GLUCOSE BLDC GLUCOMTR-MCNC: 320 MG/DL — HIGH (ref 70–99)
GLUCOSE BLDC GLUCOMTR-MCNC: 76 MG/DL — SIGNIFICANT CHANGE UP (ref 70–99)
GLUCOSE BLDC GLUCOMTR-MCNC: 77 MG/DL — SIGNIFICANT CHANGE UP (ref 70–99)
GLUCOSE BLDC GLUCOMTR-MCNC: 79 MG/DL — SIGNIFICANT CHANGE UP (ref 70–99)
GLUCOSE BLDC GLUCOMTR-MCNC: 82 MG/DL — SIGNIFICANT CHANGE UP (ref 70–99)
GLUCOSE SERPL-MCNC: 327 MG/DL — HIGH (ref 70–99)
HCOV PNL SPEC NAA+PROBE: DETECTED
HCT VFR BLD CALC: 23.5 % — LOW (ref 39–50)
HGB BLD-MCNC: 7.6 G/DL — LOW (ref 13–17)
IMM GRANULOCYTES NFR BLD AUTO: 5.6 % — HIGH (ref 0–0.9)
LYMPHOCYTES # BLD AUTO: 0.11 K/UL — LOW (ref 1–3.3)
LYMPHOCYTES # BLD AUTO: 1 % — LOW (ref 13–44)
MAGNESIUM SERPL-MCNC: 1.7 MG/DL — SIGNIFICANT CHANGE UP (ref 1.6–2.6)
MCHC RBC-ENTMCNC: 29.2 PG — SIGNIFICANT CHANGE UP (ref 27–34)
MCHC RBC-ENTMCNC: 32.3 G/DL — SIGNIFICANT CHANGE UP (ref 32–36)
MCV RBC AUTO: 90.4 FL — SIGNIFICANT CHANGE UP (ref 80–100)
MONOCYTES # BLD AUTO: 0.4 K/UL — SIGNIFICANT CHANGE UP (ref 0–0.9)
MONOCYTES NFR BLD AUTO: 3.7 % — SIGNIFICANT CHANGE UP (ref 2–14)
NEUTROPHILS # BLD AUTO: 9.66 K/UL — HIGH (ref 1.8–7.4)
NEUTROPHILS NFR BLD AUTO: 89.6 % — HIGH (ref 43–77)
NRBC # BLD: 0 /100 WBCS — SIGNIFICANT CHANGE UP (ref 0–0)
NRBC BLD-RTO: 0 /100 WBCS — SIGNIFICANT CHANGE UP (ref 0–0)
PHOSPHATE SERPL-MCNC: 2.8 MG/DL — SIGNIFICANT CHANGE UP (ref 2.5–4.5)
PLATELET # BLD AUTO: 406 K/UL — HIGH (ref 150–400)
POTASSIUM SERPL-MCNC: 5.7 MMOL/L — HIGH (ref 3.5–5.3)
POTASSIUM SERPL-SCNC: 5.7 MMOL/L — HIGH (ref 3.5–5.3)
PROT SERPL-MCNC: 5.2 G/DL — LOW (ref 6–8.3)
RAPID RVP RESULT: DETECTED
RBC # BLD: 2.6 M/UL — LOW (ref 4.2–5.8)
RBC # FLD: 18.3 % — HIGH (ref 10.3–14.5)
RH IG SCN BLD-IMP: POSITIVE — SIGNIFICANT CHANGE UP
SARS-COV-2 RNA SPEC QL NAA+PROBE: SIGNIFICANT CHANGE UP
SODIUM SERPL-SCNC: 129 MMOL/L — LOW (ref 135–145)
SPECIMEN SOURCE: SIGNIFICANT CHANGE UP
SPECIMEN SOURCE: SIGNIFICANT CHANGE UP
WBC # BLD: 10.78 K/UL — HIGH (ref 3.8–10.5)
WBC # FLD AUTO: 10.78 K/UL — HIGH (ref 3.8–10.5)

## 2025-01-29 PROCEDURE — 99232 SBSQ HOSP IP/OBS MODERATE 35: CPT

## 2025-01-29 PROCEDURE — 93010 ELECTROCARDIOGRAM REPORT: CPT

## 2025-01-29 PROCEDURE — 99232 SBSQ HOSP IP/OBS MODERATE 35: CPT | Mod: GC

## 2025-01-29 PROCEDURE — 93306 TTE W/DOPPLER COMPLETE: CPT | Mod: 26

## 2025-01-29 RX ORDER — INSULIN LISPRO 100/ML
VIAL (ML) SUBCUTANEOUS
Refills: 0 | Status: DISCONTINUED | OUTPATIENT
Start: 2025-01-29 | End: 2025-02-06

## 2025-01-29 RX ORDER — SODIUM ZIRCONIUM CYCLOSILICATE 5 G/5G
10 POWDER, FOR SUSPENSION ORAL DAILY
Refills: 0 | Status: DISCONTINUED | OUTPATIENT
Start: 2025-01-29 | End: 2025-02-02

## 2025-01-29 RX ORDER — INSULIN LISPRO 100/ML
32 VIAL (ML) SUBCUTANEOUS
Refills: 0 | Status: DISCONTINUED | OUTPATIENT
Start: 2025-01-29 | End: 2025-01-29

## 2025-01-29 RX ORDER — BACTERIOSTATIC SODIUM CHLORIDE 0.9 %
1000 VIAL (ML) INJECTION
Refills: 0 | Status: DISCONTINUED | OUTPATIENT
Start: 2025-01-29 | End: 2025-01-30

## 2025-01-29 RX ORDER — INSULIN LISPRO 100/ML
30 VIAL (ML) SUBCUTANEOUS
Refills: 0 | Status: DISCONTINUED | OUTPATIENT
Start: 2025-01-29 | End: 2025-01-30

## 2025-01-29 RX ORDER — INSULIN LISPRO 100/ML
32 VIAL (ML) SUBCUTANEOUS
Refills: 0 | Status: DISCONTINUED | OUTPATIENT
Start: 2025-01-30 | End: 2025-01-30

## 2025-01-29 RX ORDER — INSULIN LISPRO 100/ML
34 VIAL (ML) SUBCUTANEOUS
Refills: 0 | Status: DISCONTINUED | OUTPATIENT
Start: 2025-01-29 | End: 2025-01-29

## 2025-01-29 RX ORDER — PREDNISONE 5 MG/1
20 TABLET ORAL DAILY
Refills: 0 | Status: DISCONTINUED | OUTPATIENT
Start: 2025-02-03 | End: 2025-02-06

## 2025-01-29 RX ORDER — POLYETHYLENE GLYCOL 3350 17 G/17G
17 POWDER, FOR SOLUTION ORAL EVERY 12 HOURS
Refills: 0 | Status: DISCONTINUED | OUTPATIENT
Start: 2025-01-29 | End: 2025-02-06

## 2025-01-29 RX ORDER — SODIUM ZIRCONIUM CYCLOSILICATE 5 G/5G
10 POWDER, FOR SUSPENSION ORAL ONCE
Refills: 0 | Status: COMPLETED | OUTPATIENT
Start: 2025-01-29 | End: 2025-01-29

## 2025-01-29 RX ORDER — INSULIN GLARGINE-YFGN 100 [IU]/ML
47 INJECTION, SOLUTION SUBCUTANEOUS AT BEDTIME
Refills: 0 | Status: DISCONTINUED | OUTPATIENT
Start: 2025-01-29 | End: 2025-01-29

## 2025-01-29 RX ORDER — INSULIN LISPRO 100/ML
35 VIAL (ML) SUBCUTANEOUS
Refills: 0 | Status: DISCONTINUED | OUTPATIENT
Start: 2025-01-29 | End: 2025-01-29

## 2025-01-29 RX ORDER — INSULIN GLARGINE-YFGN 100 [IU]/ML
48 INJECTION, SOLUTION SUBCUTANEOUS AT BEDTIME
Refills: 0 | Status: DISCONTINUED | OUTPATIENT
Start: 2025-01-29 | End: 2025-01-30

## 2025-01-29 RX ORDER — LACTULOSE 10 G/15 ML
15 SOLUTION, ORAL ORAL EVERY 12 HOURS
Refills: 0 | Status: DISCONTINUED | OUTPATIENT
Start: 2025-01-29 | End: 2025-02-01

## 2025-01-29 RX ORDER — INSULIN LISPRO 100/ML
35 VIAL (ML) SUBCUTANEOUS
Refills: 0 | Status: DISCONTINUED | OUTPATIENT
Start: 2025-01-30 | End: 2025-01-30

## 2025-01-29 RX ORDER — PANTOPRAZOLE 20 MG/1
40 TABLET, DELAYED RELEASE ORAL
Refills: 0 | Status: DISCONTINUED | OUTPATIENT
Start: 2025-01-29 | End: 2025-02-06

## 2025-01-29 RX ADMIN — Medication 32 UNIT(S): at 09:00

## 2025-01-29 RX ADMIN — Medication 12.5 MILLIGRAM(S): at 06:10

## 2025-01-29 RX ADMIN — POLYETHYLENE GLYCOL 3350 17 GRAM(S): 17 POWDER, FOR SOLUTION ORAL at 17:50

## 2025-01-29 RX ADMIN — SULFAMETHOXAZOLE AND TRIMETHOPRIM 280 MILLIGRAM(S): 400; 80 TABLET ORAL at 13:28

## 2025-01-29 RX ADMIN — Medication 15 GRAM(S): at 17:49

## 2025-01-29 RX ADMIN — PANTOPRAZOLE 40 MILLIGRAM(S): 20 TABLET, DELAYED RELEASE ORAL at 11:33

## 2025-01-29 RX ADMIN — Medication 100 MILLIGRAM(S): at 13:35

## 2025-01-29 RX ADMIN — ROSUVASTATIN CALCIUM 10 MILLIGRAM(S): 10 TABLET, FILM COATED ORAL at 22:39

## 2025-01-29 RX ADMIN — VALACYCLOVIR 500 MILLIGRAM(S): 1000 TABLET ORAL at 06:09

## 2025-01-29 RX ADMIN — LEVOTHYROXINE SODIUM 88 MICROGRAM(S): 25 TABLET ORAL at 06:10

## 2025-01-29 RX ADMIN — CLONIDINE HYDROCHLORIDE 0.1 MILLIGRAM(S): 0.2 TABLET ORAL at 13:35

## 2025-01-29 RX ADMIN — Medication 1 DROP(S): at 17:50

## 2025-01-29 RX ADMIN — Medication 35 UNIT(S): at 12:43

## 2025-01-29 RX ADMIN — Medication 120 MILLILITER(S): at 11:33

## 2025-01-29 RX ADMIN — BUPROPION HYDROCHLORIDE 300 MILLIGRAM(S): 150 TABLET, EXTENDED RELEASE ORAL at 11:34

## 2025-01-29 RX ADMIN — Medication 1 DROP(S): at 00:39

## 2025-01-29 RX ADMIN — Medication 8: at 09:00

## 2025-01-29 RX ADMIN — INSULIN GLARGINE-YFGN 48 UNIT(S): 100 INJECTION, SOLUTION SUBCUTANEOUS at 22:40

## 2025-01-29 RX ADMIN — VALACYCLOVIR 500 MILLIGRAM(S): 1000 TABLET ORAL at 17:49

## 2025-01-29 RX ADMIN — Medication 50 MILLILITER(S): at 09:01

## 2025-01-29 RX ADMIN — Medication 30 UNIT(S): at 17:51

## 2025-01-29 RX ADMIN — Medication 100 MILLIGRAM(S): at 06:10

## 2025-01-29 RX ADMIN — Medication 2: at 17:51

## 2025-01-29 RX ADMIN — SODIUM ZIRCONIUM CYCLOSILICATE 10 GRAM(S): 5 POWDER, FOR SUSPENSION ORAL at 09:06

## 2025-01-29 RX ADMIN — Medication 2 TABLET(S): at 22:34

## 2025-01-29 RX ADMIN — ENOXAPARIN SODIUM 40 MILLIGRAM(S): 100 INJECTION SUBCUTANEOUS at 22:39

## 2025-01-29 RX ADMIN — PREDNISONE 40 MILLIGRAM(S): 5 TABLET ORAL at 06:10

## 2025-01-29 RX ADMIN — Medication 4: at 12:42

## 2025-01-29 RX ADMIN — SULFAMETHOXAZOLE AND TRIMETHOPRIM 280 MILLIGRAM(S): 400; 80 TABLET ORAL at 22:30

## 2025-01-29 RX ADMIN — Medication 10 MILLIGRAM(S): at 06:11

## 2025-01-29 RX ADMIN — Medication 1 DROP(S): at 11:33

## 2025-01-29 RX ADMIN — ESCITALOPRAM 10 MILLIGRAM(S): 10 TABLET, FILM COATED ORAL at 11:34

## 2025-01-29 RX ADMIN — Medication 12.5 MILLIGRAM(S): at 17:50

## 2025-01-29 RX ADMIN — Medication 1 DROP(S): at 06:09

## 2025-01-29 RX ADMIN — SULFAMETHOXAZOLE AND TRIMETHOPRIM 280 MILLIGRAM(S): 400; 80 TABLET ORAL at 06:09

## 2025-01-29 RX ADMIN — CLONIDINE HYDROCHLORIDE 0.1 MILLIGRAM(S): 0.2 TABLET ORAL at 06:10

## 2025-01-29 RX ADMIN — ANTISEPTIC SURGICAL SCRUB 1 APPLICATION(S): 0.04 SOLUTION TOPICAL at 09:07

## 2025-01-29 NOTE — OCCUPATIONAL THERAPY INITIAL EVALUATION ADULT - PERTINENT HX OF CURRENT PROBLEM, REHAB EVAL
68 y/o M PMHx renal transplant 2012 (2/2 DM, s/p left nephrectomy), peripheral neuropathy, hypothyroidism, retroperitoneal fibrosis, HTN, HLD, GERD, anxiety/depression, ANGELIKA and recent admission at [facility] for sacral osteomyelitis and ESBL.coli urosepsis discharged on 12/18/24 to rehab. While admitted to [place] was noted to have hypercalcemia and liver masses which were biopsied on 12/16/24, biopsy revealed DLBCL. Patient received R mini CHOP on 12/28 now admitted for cycle #2 R-mini CHOP, upon admission patient is febrile and chemotherapy held, course c/b AHRF and c/f PJP pneumonia  1/28: xray chest: Mild pulmonary vascular congestion appears similar.  CT Abd/Pelvis: Diffuse bilateral patchy groundglass opacities with central predominance, increased from 1/17/2025, may represent edema or pneumonia.Unchanged left lower lobe round atelectasis.Small left pleural effusion.Unchanged retroperitoneal soft tissue encasing the aorta and IVC.Diffuse erosive changes of the sacrum with soft tissue infiltration, similar in appearance to prior, which could represent osteomyelitis.

## 2025-01-29 NOTE — PROGRESS NOTE ADULT - SUBJECTIVE AND OBJECTIVE BOX
Patient seen today for follow up inpatient Diabetes Mellitus management.    Chief Complaint: Type 2 Diabetes Mellitus, steroid induced hyperglycemia     INTERVAL HX:  Patient seen in [facility] 5MON 512 W1. Patient is alert and oriented, resting in bed. Patient reports feeling good. Has been eating full meals and tolerating POs. Severe hyperglycemia with FBG this am to 320mg/dL, 327mg/dL on blood serum. No hypoglycemia. Noted consistent postprandial hyperglycemia to 220s. Patient denies eating any snacks between meals or overnight. Patient does report having [place] orzo/veggie/meat for dinner last night, postprandial BG stable though at 170mg/dL. Patient now tapered to oral Prednisone 40mg once daily x5 days (until 2/2). Blood glucose levels in the last 24hrs have been 170-210mg/dL.     Review of Systems:  General: As above.  Respiratory: Denies any SOB, GRAY, or cough.  Gastrointestinal: Denies any n/v/d or abdominal pain.   Endocrine: Denies any polyuria, polydipsia, polyphagia, visual changes, or numbness in feet.     Allergies  No Known Allergies      Intolerances  None.       MEDICATIONS  (STANDING):  acetaminophen     Tablet .. 650 milliGRAM(s) Oral every 6 hours PRN  amLODIPine   Tablet 10 milliGRAM(s) Oral daily  artificial tears (preservative free) Ophthalmic Solution 1 Drop(s) Both EYES every 6 hours  buPROPion XL (24-Hour) . 300 milliGRAM(s) Oral daily  carvedilol 12.5 milliGRAM(s) Oral every 12 hours  chlorhexidine 4% Liquid 1 Application(s) Topical <User Schedule>  cloNIDine 0.1 milliGRAM(s) Oral three times a day  dextrose 5%. 1000 milliLiter(s) IV Continuous <Continuous>  dextrose 5%. 1000 milliLiter(s) IV Continuous <Continuous>  dextrose 50% Injectable 25 Gram(s) IV Push once  dextrose Oral Gel 15 Gram(s) Oral once PRN  enoxaparin Injectable 40 milliGRAM(s) SubCutaneous <User Schedule>  escitalopram 10 milliGRAM(s) Oral daily  glucagon  Injectable 1 milliGRAM(s) IntraMuscular once  hydrALAZINE 100 milliGRAM(s) Oral every 8 hours  insulin glargine Injectable (LANTUS) 46 Unit(s) SubCutaneous at bedtime  insulin lispro (ADMELOG) corrective regimen sliding scale   SubCutaneous <User Schedule>  insulin lispro (ADMELOG) corrective regimen sliding scale   SubCutaneous three times a day before meals  insulin lispro Injectable (ADMELOG) 32 Unit(s) SubCutaneous three times a day before meals  lactulose Syrup 15 Gram(s) Oral <User Schedule>  levothyroxine 88 MICROGram(s) Oral daily  pantoprazole   Suspension 40 milliGRAM(s) Oral daily  polyethylene glycol 3350 17 Gram(s) Oral at bedtime  polyethylene glycol 3350 17 Gram(s) Oral daily PRN  predniSONE   Tablet 40 milliGRAM(s) Oral daily  rosuvastatin 10 milliGRAM(s) Oral at bedtime  senna 2 Tablet(s) Oral at bedtime  sodium chloride 0.9% lock flush 10 milliLiter(s) IV Push every 1 hour PRN  sodium chloride 0.9%. 1000 milliLiter(s) IV Continuous <Continuous>  trimethoprim / sulfamethoxazole IVPB 320 milliGRAM(s) IV Intermittent every 8 hours  valACYclovir 500 milliGRAM(s) Oral every 12 hours      dextrose 50% Injectable 25 Gram(s) IV Push once  dextrose Oral Gel 15 Gram(s) Oral once PRN  glucagon  Injectable 1 milliGRAM(s) IntraMuscular once  insulin glargine Injectable (LANTUS) 46 Unit(s) SubCutaneous at bedtime  insulin lispro (ADMELOG) corrective regimen sliding scale   SubCutaneous <User Schedule>  insulin lispro (ADMELOG) corrective regimen sliding scale   SubCutaneous three times a day before meals  insulin lispro Injectable (ADMELOG) 32 Unit(s) SubCutaneous three times a day before meals  levothyroxine 88 MICROGram(s) Oral daily  predniSONE   Tablet 40 milliGRAM(s) Oral daily  rosuvastatin 10 milliGRAM(s) Oral at bedtime      insulin lispro (ADMELOG) corrective regimen sliding scale   SubCutaneous <User Schedule>  insulin lispro (ADMELOG) corrective regimen sliding scale   SubCutaneous three times a day before meals  insulin lispro Injectable (ADMELOG) 32 Unit(s) SubCutaneous three times a day before meals      PHYSICAL EXAM:  VITALS:   T(C): 36.9 (01-29-25 @ 04:19), Max: 37.1 (01-28-25 @ 20:27)  HR: 67 (01-29-25 @ 04:19) (67 - 73)  BP: 140/71 (01-29-25 @ 04:19) (117/64 - 148/66)  RR: 18 (01-29-25 @ 04:19) (18 - 18)  SpO2: 97% (01-29-25 @ 04:19) (93% - 98%)    GENERAL: In no acute distress  Respiratory: Respirations unlabored  Extremities: Warm and dry, no edema  NEURO: Alert and oriented, appropriate     LABS:  POCT Blood Glucose.: 320 mg/dL (01-29-25 @ 08:41)  POCT Blood Glucose.: 170 mg/dL (01-28-25 @ 21:59)  POCT Blood Glucose.: 226 mg/dL (01-28-25 @ 17:25)  POCT Blood Glucose.: 223 mg/dL (01-28-25 @ 12:31)  POCT Blood Glucose.: 219 mg/dL (01-28-25 @ 09:07)  POCT Blood Glucose.: 319 mg/dL (01-27-25 @ 21:48)  POCT Blood Glucose.: 382 mg/dL (01-27-25 @ 17:25)  POCT Blood Glucose.: 389 mg/dL (01-27-25 @ 13:03)  POCT Blood Glucose.: 408 mg/dL (01-27-25 @ 13:00)  POCT Blood Glucose.: 363 mg/dL (01-27-25 @ 09:41)  POCT Blood Glucose.: 238 mg/dL (01-26-25 @ 22:40)  POCT Blood Glucose.: 361 mg/dL (01-26-25 @ 17:02)  POCT Blood Glucose.: 381 mg/dL (01-26-25 @ 13:33)                          7.6    10.78 )-----------( 406      ( 29 Jan 2025 05:02 )             23.5     01-29    129[L]  |  100  |  38[H]  ----------------------------<  327[H]  5.7[H]   |  22  |  1.05    Ca    9.9      29 Jan 2025 05:02  Phos  2.8     01-29  Mg     1.7     01-29    TPro  5.2[L]  /  Alb  2.3[L]  /  TBili  0.1[L]  /  DBili  x   /  AST  35  /  ALT  30  /  AlkPhos  87  01-29    LIVER FUNCTIONS - ( 29 Jan 2025 05:02 )  Alb: 2.3 g/dL / Pro: 5.2 g/dL / ALK PHOS: 87 U/L / ALT: 30 U/L / AST: 35 U/L / GGT: x           PT/INR - ( 28 Jan 2025 07:38 )   PT: 10.9 sec;   INR: 0.96 ratio         PTT - ( 28 Jan 2025 07:38 )  PTT:27.6 sec      Urinalysis Basic - ( 29 Jan 2025 05:02 )    Color: x / Appearance: x / SG: x / pH: x  Gluc: 327 mg/dL / Ketone: x  / Bili: x / Urobili: x   Blood: x / Protein: x / Nitrite: x   Leuk Esterase: x / RBC: x / WBC x   Sq Epi: x / Non Sq Epi: x / Bacteria: x      A1C with Estimated Average Glucose Result: A1C with Estimated Average Glucose Result: 8.4 % (01-28-25 @ 07:38)  A1C with Estimated Average Glucose Result: 7.8 % (12-29-24 @ 07:07)  A1C with Estimated Average Glucose Result: 7.7 % (12-28-24 @ 10:06)  A1C with Estimated Average Glucose Result: 7.4 % (11-30-24 @ 06:40)       Patient seen today for follow up inpatient Diabetes Mellitus management.    Chief Complaint: Type 2 Diabetes Mellitus, steroid induced hyperglycemia     INTERVAL HX:  Patient seen in [facility] 5MON 512 W1. Patient is alert and oriented, resting in bed. Patient reports feeling good. Has been eating full meals and tolerating POs. Severe hyperglycemia with FBG this am to 320mg/dL, 327mg/dL on blood serum around 0500. No hypoglycemia. Noted consistent postprandial hyperglycemia to 220s. Patient denies eating any snacks between meals or overnight. Patient does report having [place] orzo/veggie/meat for dinner last night, postprandial BG stable though at 170mg/dL. Patient now tapered to oral Prednisone 40mg once daily x5 days (until 2/2). Blood glucose levels in the last 24hrs have been 170-210mg/dL.     Review of Systems:  General: As above.  Respiratory: Denies any SOB, GRAY, or cough.  Gastrointestinal: Denies any n/v/d or abdominal pain.   Endocrine: Denies any polyuria, polydipsia, polyphagia, visual changes, or numbness in feet.     Allergies  No Known Allergies      Intolerances  None.       MEDICATIONS  (STANDING):  acetaminophen     Tablet .. 650 milliGRAM(s) Oral every 6 hours PRN  amLODIPine   Tablet 10 milliGRAM(s) Oral daily  artificial tears (preservative free) Ophthalmic Solution 1 Drop(s) Both EYES every 6 hours  buPROPion XL (24-Hour) . 300 milliGRAM(s) Oral daily  carvedilol 12.5 milliGRAM(s) Oral every 12 hours  chlorhexidine 4% Liquid 1 Application(s) Topical <User Schedule>  cloNIDine 0.1 milliGRAM(s) Oral three times a day  dextrose 5%. 1000 milliLiter(s) IV Continuous <Continuous>  dextrose 5%. 1000 milliLiter(s) IV Continuous <Continuous>  dextrose 50% Injectable 25 Gram(s) IV Push once  dextrose Oral Gel 15 Gram(s) Oral once PRN  enoxaparin Injectable 40 milliGRAM(s) SubCutaneous <User Schedule>  escitalopram 10 milliGRAM(s) Oral daily  glucagon  Injectable 1 milliGRAM(s) IntraMuscular once  hydrALAZINE 100 milliGRAM(s) Oral every 8 hours  insulin glargine Injectable (LANTUS) 46 Unit(s) SubCutaneous at bedtime  insulin lispro (ADMELOG) corrective regimen sliding scale   SubCutaneous <User Schedule>  insulin lispro (ADMELOG) corrective regimen sliding scale   SubCutaneous three times a day before meals  insulin lispro Injectable (ADMELOG) 32 Unit(s) SubCutaneous three times a day before meals  lactulose Syrup 15 Gram(s) Oral <User Schedule>  levothyroxine 88 MICROGram(s) Oral daily  pantoprazole   Suspension 40 milliGRAM(s) Oral daily  polyethylene glycol 3350 17 Gram(s) Oral at bedtime  polyethylene glycol 3350 17 Gram(s) Oral daily PRN  predniSONE   Tablet 40 milliGRAM(s) Oral daily  rosuvastatin 10 milliGRAM(s) Oral at bedtime  senna 2 Tablet(s) Oral at bedtime  sodium chloride 0.9% lock flush 10 milliLiter(s) IV Push every 1 hour PRN  sodium chloride 0.9%. 1000 milliLiter(s) IV Continuous <Continuous>  trimethoprim / sulfamethoxazole IVPB 320 milliGRAM(s) IV Intermittent every 8 hours  valACYclovir 500 milliGRAM(s) Oral every 12 hours      dextrose 50% Injectable 25 Gram(s) IV Push once  dextrose Oral Gel 15 Gram(s) Oral once PRN  glucagon  Injectable 1 milliGRAM(s) IntraMuscular once  insulin glargine Injectable (LANTUS) 46 Unit(s) SubCutaneous at bedtime  insulin lispro (ADMELOG) corrective regimen sliding scale   SubCutaneous <User Schedule>  insulin lispro (ADMELOG) corrective regimen sliding scale   SubCutaneous three times a day before meals  insulin lispro Injectable (ADMELOG) 32 Unit(s) SubCutaneous three times a day before meals  levothyroxine 88 MICROGram(s) Oral daily  predniSONE   Tablet 40 milliGRAM(s) Oral daily  rosuvastatin 10 milliGRAM(s) Oral at bedtime      insulin lispro (ADMELOG) corrective regimen sliding scale   SubCutaneous <User Schedule>  insulin lispro (ADMELOG) corrective regimen sliding scale   SubCutaneous three times a day before meals  insulin lispro Injectable (ADMELOG) 32 Unit(s) SubCutaneous three times a day before meals      PHYSICAL EXAM:  VITALS:   T(C): 36.9 (01-29-25 @ 04:19), Max: 37.1 (01-28-25 @ 20:27)  HR: 67 (01-29-25 @ 04:19) (67 - 73)  BP: 140/71 (01-29-25 @ 04:19) (117/64 - 148/66)  RR: 18 (01-29-25 @ 04:19) (18 - 18)  SpO2: 97% (01-29-25 @ 04:19) (93% - 98%)    GENERAL: In no acute distress  Respiratory: Respirations unlabored  Extremities: Warm and dry, no edema  NEURO: Alert and oriented, appropriate     LABS:  POCT Blood Glucose.: 320 mg/dL (01-29-25 @ 08:41)  POCT Blood Glucose.: 170 mg/dL (01-28-25 @ 21:59)  POCT Blood Glucose.: 226 mg/dL (01-28-25 @ 17:25)  POCT Blood Glucose.: 223 mg/dL (01-28-25 @ 12:31)  POCT Blood Glucose.: 219 mg/dL (01-28-25 @ 09:07)  POCT Blood Glucose.: 319 mg/dL (01-27-25 @ 21:48)  POCT Blood Glucose.: 382 mg/dL (01-27-25 @ 17:25)  POCT Blood Glucose.: 389 mg/dL (01-27-25 @ 13:03)  POCT Blood Glucose.: 408 mg/dL (01-27-25 @ 13:00)  POCT Blood Glucose.: 363 mg/dL (01-27-25 @ 09:41)  POCT Blood Glucose.: 238 mg/dL (01-26-25 @ 22:40)  POCT Blood Glucose.: 361 mg/dL (01-26-25 @ 17:02)  POCT Blood Glucose.: 381 mg/dL (01-26-25 @ 13:33)                          7.6    10.78 )-----------( 406      ( 29 Jan 2025 05:02 )             23.5     01-29    129[L]  |  100  |  38[H]  ----------------------------<  327[H]  5.7[H]   |  22  |  1.05    Ca    9.9      29 Jan 2025 05:02  Phos  2.8     01-29  Mg     1.7     01-29    TPro  5.2[L]  /  Alb  2.3[L]  /  TBili  0.1[L]  /  DBili  x   /  AST  35  /  ALT  30  /  AlkPhos  87  01-29    LIVER FUNCTIONS - ( 29 Jan 2025 05:02 )  Alb: 2.3 g/dL / Pro: 5.2 g/dL / ALK PHOS: 87 U/L / ALT: 30 U/L / AST: 35 U/L / GGT: x           PT/INR - ( 28 Jan 2025 07:38 )   PT: 10.9 sec;   INR: 0.96 ratio         PTT - ( 28 Jan 2025 07:38 )  PTT:27.6 sec      Urinalysis Basic - ( 29 Jan 2025 05:02 )    Color: x / Appearance: x / SG: x / pH: x  Gluc: 327 mg/dL / Ketone: x  / Bili: x / Urobili: x   Blood: x / Protein: x / Nitrite: x   Leuk Esterase: x / RBC: x / WBC x   Sq Epi: x / Non Sq Epi: x / Bacteria: x      A1C with Estimated Average Glucose Result: A1C with Estimated Average Glucose Result: 8.4 % (01-28-25 @ 07:38)  A1C with Estimated Average Glucose Result: 7.8 % (12-29-24 @ 07:07)  A1C with Estimated Average Glucose Result: 7.7 % (12-28-24 @ 10:06)  A1C with Estimated Average Glucose Result: 7.4 % (11-30-24 @ 06:40)

## 2025-01-29 NOTE — OCCUPATIONAL THERAPY INITIAL EVALUATION ADULT - DIAGNOSIS, OT EVAL
decreased functional activity endurance, decreased strength, Impaired balance, impaired safety awareness, impacting ability to perform ADL and functional mobility.

## 2025-01-29 NOTE — ADVANCED PRACTICE NURSE CONSULT - RECOMMEDATIONS
1. sacrum, b/l buttocks: continue to monitor, apply Triad past twice daily and prn for incontinence  2. continue with T&P  3. continue with heel off-loading  4. seat cushion when oob to chair  5. nutrition support as pt condition allows  Tx plan discussed with pt/MD/RN

## 2025-01-29 NOTE — PROGRESS NOTE ADULT - SUBJECTIVE AND OBJECTIVE BOX
PROGRESS NOTE:   Authored by: Aleida Quintanilla MS4   Please contact via Teams.     Patient is a 69y old  Male who presents with a chief complaint of "For chemo" (29 Jan 2025 09:03)      SUBJECTIVE / OVERNIGHT EVENTS:  No acute events overnight.     Tele:    Brief Daily Plan:    MEDICATIONS  (STANDING):  amLODIPine   Tablet 10 milliGRAM(s) Oral daily  artificial tears (preservative free) Ophthalmic Solution 1 Drop(s) Both EYES every 6 hours  buPROPion XL (24-Hour) . 300 milliGRAM(s) Oral daily  carvedilol 12.5 milliGRAM(s) Oral every 12 hours  chlorhexidine 4% Liquid 1 Application(s) Topical <User Schedule>  cloNIDine 0.1 milliGRAM(s) Oral three times a day  dextrose 5%. 1000 milliLiter(s) (50 mL/Hr) IV Continuous <Continuous>  dextrose 5%. 1000 milliLiter(s) (100 mL/Hr) IV Continuous <Continuous>  dextrose 50% Injectable 25 Gram(s) IV Push once  enoxaparin Injectable 40 milliGRAM(s) SubCutaneous <User Schedule>  escitalopram 10 milliGRAM(s) Oral daily  glucagon  Injectable 1 milliGRAM(s) IntraMuscular once  hydrALAZINE 100 milliGRAM(s) Oral every 8 hours  insulin glargine Injectable (LANTUS) 46 Unit(s) SubCutaneous at bedtime  insulin lispro (ADMELOG) corrective regimen sliding scale   SubCutaneous three times a day before meals  insulin lispro (ADMELOG) corrective regimen sliding scale   SubCutaneous <User Schedule>  insulin lispro Injectable (ADMELOG) 32 Unit(s) SubCutaneous three times a day before meals  lactulose Syrup 15 Gram(s) Oral <User Schedule>  levothyroxine 88 MICROGram(s) Oral daily  pantoprazole   Suspension 40 milliGRAM(s) Oral daily  polyethylene glycol 3350 17 Gram(s) Oral at bedtime  predniSONE   Tablet 40 milliGRAM(s) Oral daily  rosuvastatin 10 milliGRAM(s) Oral at bedtime  senna 2 Tablet(s) Oral at bedtime  sodium chloride 0.9%. 1000 milliLiter(s) (50 mL/Hr) IV Continuous <Continuous>  trimethoprim / sulfamethoxazole IVPB 320 milliGRAM(s) IV Intermittent every 8 hours  valACYclovir 500 milliGRAM(s) Oral every 12 hours    MEDICATIONS  (PRN):  acetaminophen     Tablet .. 650 milliGRAM(s) Oral every 6 hours PRN Temp greater or equal to 38C (100.4F), Mild Pain (1 - 3)  dextrose Oral Gel 15 Gram(s) Oral once PRN Blood Glucose LESS THAN 70 milliGRAM(s)/deciliter  polyethylene glycol 3350 17 Gram(s) Oral daily PRN for constipation  sodium chloride 0.9% lock flush 10 milliLiter(s) IV Push every 1 hour PRN Pre/post blood products, medications, blood draw, and to maintain line patency      CAPILLARY BLOOD GLUCOSE      POCT Blood Glucose.: 320 mg/dL (29 Jan 2025 08:41)  POCT Blood Glucose.: 170 mg/dL (28 Jan 2025 21:59)  POCT Blood Glucose.: 226 mg/dL (28 Jan 2025 17:25)  POCT Blood Glucose.: 223 mg/dL (28 Jan 2025 12:31)    I&O's Summary    28 Jan 2025 07:01  -  29 Jan 2025 07:00  --------------------------------------------------------  IN: 1280 mL / OUT: 4800 mL / NET: -3520 mL        PHYSICAL EXAM:  Vital Signs Last 24 Hrs  T(C): 36.9 (29 Jan 2025 04:19), Max: 37.2 (28 Jan 2025 09:49)  T(F): 98.4 (29 Jan 2025 04:19), Max: 99 (28 Jan 2025 09:49)  HR: 67 (29 Jan 2025 04:19) (67 - 73)  BP: 140/71 (29 Jan 2025 04:19) (117/64 - 148/66)  BP(mean): --  RR: 18 (29 Jan 2025 04:19) (18 - 18)  SpO2: 97% (29 Jan 2025 04:19) (93% - 98%)    Parameters below as of 29 Jan 2025 04:19  Patient On (Oxygen Delivery Method): nasal cannula        CONSTITUTIONAL: NAD, well-developed  RESPIRATORY: Normal respiratory effort; lungs are clear to auscultation bilaterally  CARDIOVASCULAR: Regular rate and rhythm, normal S1 and S2, no murmur/rub/gallop; No lower extremity edema; Peripheral pulses are 2+ bilaterally  ABDOMEN: Nontender to palpation, normoactive bowel sounds, no rebound/guarding; No hepatosplenomegaly  MUSCLOSKELETAL: no clubbing or cyanosis of digits; no joint swelling or tenderness to palpation  PSYCH: A+O to person, place, and time; affect appropriate    LABS:                        7.6    10.78 )-----------( 406      ( 29 Jan 2025 05:02 )             23.5     01-29    129[L]  |  100  |  38[H]  ----------------------------<  327[H]  5.7[H]   |  22  |  1.05    Ca    9.9      29 Jan 2025 05:02  Phos  2.8     01-29  Mg     1.7     01-29    TPro  5.2[L]  /  Alb  2.3[L]  /  TBili  0.1[L]  /  DBili  x   /  AST  35  /  ALT  30  /  AlkPhos  87  01-29    PT/INR - ( 28 Jan 2025 07:38 )   PT: 10.9 sec;   INR: 0.96 ratio         PTT - ( 28 Jan 2025 07:38 )  PTT:27.6 sec        MICRO:  Urinalysis Basic - ( 29 Jan 2025 05:02 )    Color: x / Appearance: x / SG: x / pH: x  Gluc: 327 mg/dL / Ketone: x  / Bili: x / Urobili: x   Blood: x / Protein: x / Nitrite: x   Leuk Esterase: x / RBC: x / WBC x   Sq Epi: x / Non Sq Epi: x / Bacteria: x          IMAGING: PROGRESS NOTE:   Authored by: Aleida Quintanilla MS4   Please contact via Teams.     Patient is a 69y old  Male who presents with a chief complaint of "For chemo" (29 Jan 2025 09:03)      SUBJECTIVE / OVERNIGHT EVENTS:  No acute events overnight.       MEDICATIONS  (STANDING):  amLODIPine   Tablet 10 milliGRAM(s) Oral daily  artificial tears (preservative free) Ophthalmic Solution 1 Drop(s) Both EYES every 6 hours  buPROPion XL (24-Hour) . 300 milliGRAM(s) Oral daily  carvedilol 12.5 milliGRAM(s) Oral every 12 hours  chlorhexidine 4% Liquid 1 Application(s) Topical <User Schedule>  cloNIDine 0.1 milliGRAM(s) Oral three times a day  dextrose 5%. 1000 milliLiter(s) (50 mL/Hr) IV Continuous <Continuous>  dextrose 5%. 1000 milliLiter(s) (100 mL/Hr) IV Continuous <Continuous>  dextrose 50% Injectable 25 Gram(s) IV Push once  enoxaparin Injectable 40 milliGRAM(s) SubCutaneous <User Schedule>  escitalopram 10 milliGRAM(s) Oral daily  glucagon  Injectable 1 milliGRAM(s) IntraMuscular once  hydrALAZINE 100 milliGRAM(s) Oral every 8 hours  insulin glargine Injectable (LANTUS) 46 Unit(s) SubCutaneous at bedtime  insulin lispro (ADMELOG) corrective regimen sliding scale   SubCutaneous three times a day before meals  insulin lispro (ADMELOG) corrective regimen sliding scale   SubCutaneous <User Schedule>  insulin lispro Injectable (ADMELOG) 32 Unit(s) SubCutaneous three times a day before meals  lactulose Syrup 15 Gram(s) Oral <User Schedule>  levothyroxine 88 MICROGram(s) Oral daily  pantoprazole   Suspension 40 milliGRAM(s) Oral daily  polyethylene glycol 3350 17 Gram(s) Oral at bedtime  predniSONE   Tablet 40 milliGRAM(s) Oral daily  rosuvastatin 10 milliGRAM(s) Oral at bedtime  senna 2 Tablet(s) Oral at bedtime  sodium chloride 0.9%. 1000 milliLiter(s) (50 mL/Hr) IV Continuous <Continuous>  trimethoprim / sulfamethoxazole IVPB 320 milliGRAM(s) IV Intermittent every 8 hours  valACYclovir 500 milliGRAM(s) Oral every 12 hours    MEDICATIONS  (PRN):  acetaminophen     Tablet .. 650 milliGRAM(s) Oral every 6 hours PRN Temp greater or equal to 38C (100.4F), Mild Pain (1 - 3)  dextrose Oral Gel 15 Gram(s) Oral once PRN Blood Glucose LESS THAN 70 milliGRAM(s)/deciliter  polyethylene glycol 3350 17 Gram(s) Oral daily PRN for constipation  sodium chloride 0.9% lock flush 10 milliLiter(s) IV Push every 1 hour PRN Pre/post blood products, medications, blood draw, and to maintain line patency      CAPILLARY BLOOD GLUCOSE      POCT Blood Glucose.: 320 mg/dL (29 Jan 2025 08:41)  POCT Blood Glucose.: 170 mg/dL (28 Jan 2025 21:59)  POCT Blood Glucose.: 226 mg/dL (28 Jan 2025 17:25)  POCT Blood Glucose.: 223 mg/dL (28 Jan 2025 12:31)    I&O's Summary    28 Jan 2025 07:01  -  29 Jan 2025 07:00  --------------------------------------------------------  IN: 1280 mL / OUT: 4800 mL / NET: -3520 mL        PHYSICAL EXAM:  Vital Signs Last 24 Hrs  T(C): 36.9 (29 Jan 2025 04:19), Max: 37.2 (28 Jan 2025 09:49)  T(F): 98.4 (29 Jan 2025 04:19), Max: 99 (28 Jan 2025 09:49)  HR: 67 (29 Jan 2025 04:19) (67 - 73)  BP: 140/71 (29 Jan 2025 04:19) (117/64 - 148/66)  BP(mean): --  RR: 18 (29 Jan 2025 04:19) (18 - 18)  SpO2: 97% (29 Jan 2025 04:19) (93% - 98%)    Parameters below as of 29 Jan 2025 04:19  Patient On (Oxygen Delivery Method): nasal cannula        CONSTITUTIONAL: NAD, well-developed  RESPIRATORY: Normal respiratory effort; lungs are clear to auscultation bilaterally  CARDIOVASCULAR: Regular rate and rhythm, normal S1 and S2, no murmur/rub/gallop; No lower extremity edema; Peripheral pulses are 2+ bilaterally  ABDOMEN: Nontender to palpation, normoactive bowel sounds, no rebound/guarding; No hepatosplenomegaly  MUSCLOSKELETAL: no clubbing or cyanosis of digits; no joint swelling or tenderness to palpation  PSYCH: A+O to person, place, and time; affect appropriate    LABS:                        7.6    10.78 )-----------( 406      ( 29 Jan 2025 05:02 )             23.5     01-29    129[L]  |  100  |  38[H]  ----------------------------<  327[H]  5.7[H]   |  22  |  1.05    Ca    9.9      29 Jan 2025 05:02  Phos  2.8     01-29  Mg     1.7     01-29    TPro  5.2[L]  /  Alb  2.3[L]  /  TBili  0.1[L]  /  DBili  x   /  AST  35  /  ALT  30  /  AlkPhos  87  01-29    PT/INR - ( 28 Jan 2025 07:38 )   PT: 10.9 sec;   INR: 0.96 ratio         PTT - ( 28 Jan 2025 07:38 )  PTT:27.6 sec        MICRO:  Urinalysis Basic - ( 29 Jan 2025 05:02 )    Color: x / Appearance: x / SG: x / pH: x  Gluc: 327 mg/dL / Ketone: x  / Bili: x / Urobili: x   Blood: x / Protein: x / Nitrite: x   Leuk Esterase: x / RBC: x / WBC x   Sq Epi: x / Non Sq Epi: x / Bacteria: x

## 2025-01-29 NOTE — PROGRESS NOTE ADULT - PROBLEM SELECTOR PLAN 3
Home regimen: Levothyroxine 88 mcg daily  12/31/24 TSH 0.55 FT4 1.2  Continue levothyroxine 88 mcg daily

## 2025-01-29 NOTE — PROGRESS NOTE ADULT - ASSESSMENT
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 Endocrinology consulted for hyperglycemia. Patient is doing well, continues on chemo and steroids, off oxygen. Patient is eating well and tolerating POs. Now tapered to oral Pred 50mg once daily x5 days (until 2/2). FBG with severe hyperglycemia most likely 2/2 to steroid dose given in am around 0500 -> will add 2AM ISS for now. Will also slightly increase Lantus to 47u QHS. No hypoglycemia. Noted consistent postprandial hyperglycemia, will adjust mealtime insulin for tighter BG control. Endocrine will closely monitor BG and adjust insulin as needed for BG goal 100-180mg/dL inpatient.     #Uncontrolled Type 2 Diabetes Mellitus with  #Steroid-induced hyperglycemia  - Follows with: Dr. 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 Endocrinology consulted for hyperglycemia. Patient is doing well, continues on chemo and steroids, off oxygen. Patient is eating well and tolerating POs. Now tapered to oral Pred 50mg once daily x5 days (until 2/2). FBG with severe hyperglycemia most likely 2/2 to steroid but also noted blood serum at 5am with severe hyperglycemia as well -> will add 2AM ISS for now. Will also slightly increase Lantus to 48u QHS. No hypoglycemia. Noted consistent postprandial hyperglycemia, will adjust mealtime insulin for tighter BG control. Endocrine will closely monitor BG and adjust insulin as needed for BG goal 100-180mg/dL inpatient.     #Uncontrolled Type 2 Diabetes Mellitus with  #Steroid-induced hyperglycemia  - Follows with: Dr. 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 Endocrinology consulted for hyperglycemia. Patient is doing well, continues on chemo and steroids, off oxygen. Patient is eating well and tolerating POs. Now tapered to oral Pred 50mg once daily x5 days (until 2/2). FBG with severe hyperglycemia most likely 2/2 to steroid but also noted blood serum at 5am with severe hyperglycemia as well -> will add 2AM ISS for now. Will also slightly increase Lantus to 48u QHS. No hypoglycemia. Noted consistent postprandial hyperglycemia, will adjust mealtime insulin for tighter BG control but also note decrease in steroid dose. Endocrine will closely monitor BG and adjust insulin as needed for BG goal 100-180mg/dL inpatient.     #Uncontrolled Type 2 Diabetes Mellitus with  #Steroid-induced hyperglycemia  - Follows with: Dr. Romero  - A1C with Estimated Average Glucose Result: 7.8 % (12-29-24)  - home regimen: Came from rehab, there he was on Lantus 38 units, Admelog 10 units with correction scale  - eGFR: 70 mL/min/1.73m2 (01-26-25)  - glucose grossly elevated, no evidence of DKA on labs

## 2025-01-29 NOTE — PROGRESS NOTE ADULT - PROBLEM SELECTOR PLAN 1
Admitted under heme onc service for R mini CHOP, found to be febrile on admission, plans for any chemo or tafasitamab/lenalidomide are now ON HOLD iso AHRF and c/f PJP PNA  Monitor CBC with diff, transfuse as needed to maintain Hb >7  Monitor electrolytes, replete as needed.  Daily weights.Strict I/O. mouth care

## 2025-01-29 NOTE — ADVANCED PRACTICE NURSE CONSULT - ASSESSMENT
The pt was encountered on [facility]- Mr Gann was awake and alert , engaging in conversation. he is in a low air-loss surface and is being assisted with T&P as per review of the nursing documentation. a shakira-form positioner is in use.  As he was a/w a pressure injury he was seen by nutrition and found to have moderate protein calorie malnutrition.  He has a condom catheter in place to divert urine from the skin.  Upon assessment the deep tissue injury noted on last assessment, has continued to evolve. Today it measures 11cm x 13cm x 0.2cm with 30% open wounds  with pale pink moist tissue, 30% intact pink skin and 40% darkly pigmented intact skin.  Will recommend to continue with Triad paste as previously recommended.  Pts medical team was at the bedside and visualized the wound.  education was provided to the pt re skin condition, tx plan and PI prevention.

## 2025-01-29 NOTE — PROGRESS NOTE ADULT - PROBLEM SELECTOR PLAN 2
Patient on oral Prednisone taper now.     - S/p oral Pred 40mg BID x2 days (1/27-1/28)  - Now on oral Pred 40mg once daily x5 days (1/29-2/2)

## 2025-01-29 NOTE — PROGRESS NOTE ADULT - ASSESSMENT
68 y/o M PMHx renal transplant 2012 (2/2 DM, s/p left nephrectomy), peripheral neuropathy, hypothyroidism, retroperitoneal fibrosis, HTN, HLD, GERD, anxiety/depression, ANGELIKA and recent admission at [facility] for sacral osteomyelitis and ESBL.coli urosepsis discharged on 12/18/24 to rehab. While admitted to [facility] was noted to have hypercalcemia and liver masses which were biopsied on 12/16/24, biopsy revealed DLBCL. Patient received R mini CHOP on 12/28 now admitted for cycle #2 R-mini CHOP, upon admission patient is febrile and chemotherapy held, course c/b AHRF and c/f PJP pneumonia

## 2025-01-29 NOTE — PROGRESS NOTE ADULT - PROBLEM SELECTOR PLAN 2
1/17 Admitted + for Coronavirus, 1/24 - CT chest with c/f new PCP pneumonia, recs DC IV vanc, switch IV erta to IV mick given hypoalbuminemia efficacy concerns and start IV bactrim for empiric PCP PNA treatment as well as steroid taper pred 40mg BID x5 days followed by pred 40mg QD x5d then 20mg QD.   1/24 fungitell high > 500; pending galactomannan, beta D glucan, Initially on HFNC now weaned to 6 LNC    Plan:  -wean O2 as tolerated and monitor on pulse ox  -f/u sputum culture and sputum for PCP PCR if able to expectorate   -f/u aspergillus Ag, in process   -Bactrim 320mg IV Q8h (based on pts weight) - plan for 21d course   -c/w Prednisone 40mg PO BID x5d until 1/28 then 40mg daily x5d (1/29-2/2), then 20mg daily for 11 days (2/3-2/13)  -Continue on valtrex ppx  - Monitor temps/CBC 1/17 Admitted + for Coronavirus, 1/24 - CT chest with c/f new PCP pneumonia, recs DC IV vanc, switch IV erta to IV mick given hypoalbuminemia efficacy concerns and start IV bactrim for empiric PCP PNA treatment as well as steroid taper pred 40mg BID x5 days followed by pred 40mg QD x5d then 20mg QD.   1/24 fungitell high > 500; pending galactomannan, beta D glucan, Initially on HFNC, weaned to 6L NC     Plan:  -wean O2 as tolerated and monitor on pulse ox, currently on 4L NC  -f/u sputum culture and sputum for PCP PCR if able to expectorate   -f/u aspergillus Ag, in process   -Bactrim 320mg IV Q8h (based on pts weight) - plan for 21d course   -c/w Prednisone 40mg PO BID x5d until 1/28 then 40mg daily x5d (1/29-2/2), then 20mg daily for 11 days (2/3-2/13)  -Continue on valtrex ppx  - Monitor temps/CBC

## 2025-01-29 NOTE — OCCUPATIONAL THERAPY INITIAL EVALUATION ADULT - RANGE OF MOTION EXAMINATION, UPPER EXTREMITY
[facility]  [address]  Phone: [phone]    EMERGENCY DEPARTMENT ENCOUNTER          Pt Name: Norma Osorio  MRN: [id number]  [facility] 1960  Date of evaluation: 10/15/2023      CHIEF COMPLAINT       Chief Complaint   Patient presents with    Dental Pain     Upper R side       HISTORY OF PRESENT ILLNESS       Norma Osorio is a 66 y.o. female who presents with right upper canine dental pain. She had some food stuck and used a Waterpik to remove the food and thinks she might of struck a nerve in his head severe dental pain since that occurred around 10 last night. Has had very little sleep. Has tried 1 hydrocodone from prior back surgery and some ibuprofen about an hour prior to arrival without any relief. She is very sensitive to medications and cannot have morphine. She also cannot have Percocet. She does have well-controlled type 2 diabetes and is also on insulin and a GLP-1 agonist.  Denies other symptoms or concerns. The pain does radiate to her right temporal region however. REVIEW OF SYSTEMS       Review of Systems   Constitutional:  Negative for chills, fatigue and fever. HENT:  Positive for dental problem. Negative for rhinorrhea and sore throat. Eyes:  Negative for pain. Respiratory:  Negative for cough and shortness of breath. Cardiovascular:  Negative for chest pain. Gastrointestinal:  Negative for abdominal pain, diarrhea, nausea and vomiting. Genitourinary:  Negative for difficulty urinating. Musculoskeletal:  Negative for back pain and neck pain. Skin:  Negative for rash. Neurological:  Negative for weakness and headaches.         PAST MEDICAL HISTORY    has a past medical history of CAD (coronary artery disease), Colostomy in place [facility]), Crohn's disease ([address] [facility]), Frequent UTI, History of blood transfusion, Neuropathy, Osteopenia of multiple sites, PONV (postoperative nausea and vomiting), Psoriatic arthritis ([address] ), and Type 2 bilateral UE Active ROM was WFL  (within functional limits)

## 2025-01-29 NOTE — PROGRESS NOTE ADULT - NUTRITIONAL ASSESSMENT
Diet, Consistent Carbohydrate/No Snacks:   Supplement Feeding Modality:  Oral  Glucerna Shake Cans or Servings Per Day:  1       Frequency:  Daily (01-26-25 @ 23:00) [Active]

## 2025-01-29 NOTE — OCCUPATIONAL THERAPY INITIAL EVALUATION ADULT - GENERAL OBSERVATIONS, REHAB EVAL
Upon entry, patient semi-supine in bed +02 via NC +pulse ox +tele +IVL +condom cath, patient agreeable to OT eval, cleared for OT evaluation as per NURA Holliday

## 2025-01-29 NOTE — OCCUPATIONAL THERAPY INITIAL EVALUATION ADULT - NSOTDMEREC_GEN_A_CORE
If home, Pt will need 24/7 assist and supervision with all ADL and functional mobility, will require poly fly w/c, hospital bed, mechanical lift.

## 2025-01-29 NOTE — PROGRESS NOTE ADULT - PROBLEM SELECTOR PLAN 3
Course currently c/b steroid induced hyperglycemia     -Endocrinology following, recs appreciated  -C/w Lantus 46 units QHS, Admelog 30units TID before meals (HOLD if NPO or not eating). Taper down preemptively if steroids are being reduced to avoid hypoglycemia.   - Recommend moderate dose admelog correction scale TIDQAC and separate moderate dose scale QHS  - POCT glucose before meals and QHS, or q6h while NPO  - Inpatient glucose goals: <140 pre-meal, <180 random  - consistent carb diet

## 2025-01-29 NOTE — PROGRESS NOTE ADULT - PROBLEM SELECTOR PLAN 5
AT RISK FOR ADRENAL INSUFFIENCEY IF HYPOTENSIVE 50mg hydrocortisone IV q8  s/p Hydrocortisone 25 mg IV, now off--on pred taper for PJP PNA  1/26 D/C Tacrolimus and Lisinopril due to DIMITRIOS and PCP.   Transplant nephro / Transplant ID following AT RISK FOR ADRENAL INSUFFIENCEY IF HYPOTENSIVE 50mg hydrocortisone IV q8  s/p Hydrocortisone 25 mg IV, now off--on pred taper for PJP PNA  1/26 D/C Tacrolimus and Lisinopril due to DIMITRIOS and PCP.   Transplant nephro / Transplant ID following\    UOP ~ negative 4L, increasing IVF   Hyperkalemia to 5.7 noted, EKG ordered, lokelma ordered, and increased bowel regimen

## 2025-01-29 NOTE — OCCUPATIONAL THERAPY INITIAL EVALUATION ADULT - ADDITIONAL COMMENTS
Pt is poor historian, Patient resides in a private home with Spouse no steps to enter. Pt prior hospitailization at [facility] patient was [facility] rehab. Patient has walker, cane and W/C at home. Pt now admitted from [facility]

## 2025-01-29 NOTE — PROGRESS NOTE ADULT - PROBLEM SELECTOR PLAN 1
INPATIENT PLAN:  - Check BG TID AC, HS, and 2AM  - Adjust Lantus to 47u QHS  - Adjust Admelog to 32u TID AC (HOLD if NPO or not eating meal)   - C/w moderate dose Admelog correctional scales TID AC, HS, and start 2AM    DISCHARGE PLANNING:  - Discharge recs pending clinical course and depending on steroids. Will need basal/bolus (doses TBD).   - Endocrine follow up: can f/u wit his Endocrinologist Dr. Romero.   - Recommend routine outpatient ophthalmology and podiatry follow up. INPATIENT PLAN:  - Check BG TID AC, HS, and 2AM  - Adjust Lantus to 48u QHS  - Adjust Admelog to 32u TID AC (HOLD if NPO or not eating meal)   - C/w moderate dose Admelog correctional scales TID AC, HS, and start 2AM    DISCHARGE PLANNING:  - Discharge recs pending clinical course and depending on steroids. Will need basal/bolus (doses TBD).   - Endocrine follow up: can f/u wit his Endocrinologist Dr. Romero.   - Recommend routine outpatient ophthalmology and podiatry follow up. INPATIENT PLAN:  - Check BG TID AC, HS, and 2AM  - Adjust Lantus to 48u QHS  - Adjust Admelog 38u with breakfast, 35u with lunch, 34u with dinner (HOLD if NPO or not eating meal)   - C/w moderate dose Admelog correctional scales TID AC, HS, and start 2AM    DISCHARGE PLANNING:  - Discharge recs pending clinical course and depending on steroids. Will need basal/bolus (doses TBD).   - Endocrine follow up: can f/u wit his Endocrinologist Dr. Romero.   - Recommend routine outpatient ophthalmology and podiatry follow up. INPATIENT PLAN:  - Check BG TID AC, HS, and 2AM  - Adjust Lantus to 48u QHS  - Adjust Admelog 35u with breakfast, 32u with lunch, 30u with dinner (HOLD if NPO or not eating meal)   - C/w moderate dose Admelog correctional scales TID AC, HS, and start 2AM    DISCHARGE PLANNING:  - Discharge recs pending clinical course and depending on steroids. Will need basal/bolus (doses TBD).   - Endocrine follow up: can f/u wit his Endocrinologist Dr. Romero.   - Recommend routine outpatient ophthalmology and podiatry follow up.

## 2025-01-29 NOTE — PROGRESS NOTE ADULT - SUBJECTIVE AND OBJECTIVE BOX
[facility]  WANG Mccoy Y. Patel, S. Shah, G. Casimir  [phone]  ([phone] - weekdays after 5pm and weekends)    Name: JAN CALVIN  Age/Gender: 69y Male  MRN: [id number]    Interval History:  Patient seen and examined this morning.   Resting on NC now, no new complaints.  Feels breathing is better, no cough or pain.   Notes reviewed  No concerning overnight events  Afebrile   Allergies: No Known Allergies      Objective:  Vitals:   T(F): 98.4 (01-29-25 @ 04:19), Max: 99 (01-28-25 @ 09:49)  HR: 67 (01-29-25 @ 04:19) (67 - 73)  BP: 140/71 (01-29-25 @ 04:19) (117/64 - 148/66)  RR: 18 (01-29-25 @ 04:19) (18 - 18)  SpO2: 97% (01-29-25 @ 04:19) (93% - 98%)  Physical Examination:  General: no acute distress, NC  HEENT: NC/AT, anicteric, EOMI  Respiratory: decreased breath sounds b/l   Cardiovascular: S1 and S2 present, normal rate   Gastrointestinal: soft, nontender, nondistended  Extremities: no edema, no cyanosis  Skin: no visible rash    Laboratory Studies:  CBC:                       7.6    10.78 )-----------( 406      ( 29 Jan 2025 05:02 )             23.5     WBC Trend:  10.78 01-29-25 @ 05:02  10.57 01-28-25 @ 07:36  8.17 01-27-25 @ 06:58  11.69 01-26-25 @ 06:53  8.38 01-25-25 @ 06:37  10.35 01-24-25 @ 07:30  7.93 01-23-25 @ 06:57    CMP: 01-29    129[L]  |  100  |  38[H]  ----------------------------<  327[H]  5.7[H]   |  22  |  1.05    Ca    9.9      29 Jan 2025 05:02  Phos  2.8     01-29  Mg     1.7     01-29    TPro  5.2[L]  /  Alb  2.3[L]  /  TBili  0.1[L]  /  DBili  x   /  AST  35  /  ALT  30  /  AlkPhos  87  01-29    Creatinine: 1.05 mg/dL (01-29-25 @ 05:02)  Creatinine: 1.12 mg/dL (01-28-25 @ 07:34)  Creatinine: 0.87 mg/dL (01-27-25 @ 07:00)  Creatinine: 1.01 mg/dL (01-26-25 @ 17:42)  Creatinine: 1.14 mg/dL (01-26-25 @ 08:11)  Creatinine: 1.17 mg/dL (01-26-25 @ 06:54)  Creatinine: 1.05 mg/dL (01-25-25 @ 06:37)  Creatinine: 0.93 mg/dL (01-24-25 @ 07:23)  Creatinine: 0.84 mg/dL (01-23-25 @ 07:00)    LIVER FUNCTIONS - ( 29 Jan 2025 05:02 )  Alb: 2.3 g/dL / Pro: 5.2 g/dL / ALK PHOS: 87 U/L / ALT: 30 U/L / AST: 35 U/L / GGT: x           Microbiology: reviewed   Culture - Blood (collected 01-24-25 @ 00:46)  Source: .Blood BLOOD  Final Report (01-29-25 @ 04:00):    No growth at 5 days    Culture - Blood (collected 01-24-25 @ 00:18)  Source: .Blood BLOOD  Final Report (01-29-25 @ 04:00):    No growth at 5 days    Culture - Urine (collected 01-22-25 @ 05:05)  Source: Clean Catch Clean Catch (Midstream)  Final Report (01-24-25 @ 13:31):    10,000 - 49,000 CFU/mL Candida albicans    "Susceptibilities not performed"    Culture - Blood (collected 01-21-25 @ 22:25)  Source: .Blood BLOOD  Final Report (01-27-25 @ 03:00):    No growth at 5 days    Culture - Blood (collected 01-21-25 @ 22:07)  Source: .Blood BLOOD  Final Report (01-27-25 @ 03:00):    No growth at 5 days    Culture - Urine (collected 01-19-25 @ 18:36)  Source: Clean Catch Clean Catch (Midstream)  Final Report (01-20-25 @ 21:26):    10,000 - 49,000 CFU/mL Candida albicans    "Susceptibilities not performed"    Culture - Blood (collected 01-19-25 @ 18:36)  Source: .Blood BLOOD  Final Report (01-24-25 @ 23:00):    No growth at 5 days    Culture - Urine (collected 01-17-25 @ 17:09)  Source: Clean Catch Clean Catch (Midstream)  Final Report (01-18-25 @ 20:03):    >=3 organisms. Probable collection contamination.    Culture - Blood (collected 01-17-25 @ 11:38)  Source: .Blood BLOOD  Final Report (01-22-25 @ 15:00):    No growth at 5 days    01-28-25 @ 17:33  SARS-CoV-2 NotDetec  Influenza A NotDetec  Influenza B NotDetec  RSV NotDetec    Radiology: reviewed     Medications:  acetaminophen     Tablet .. 650 milliGRAM(s) Oral every 6 hours PRN  amLODIPine   Tablet 10 milliGRAM(s) Oral daily  artificial tears (preservative free) Ophthalmic Solution 1 Drop(s) Both EYES every 6 hours  buPROPion XL (24-Hour) . 300 milliGRAM(s) Oral daily  carvedilol 12.5 milliGRAM(s) Oral every 12 hours  chlorhexidine 4% Liquid 1 Application(s) Topical <User Schedule>  cloNIDine 0.1 milliGRAM(s) Oral three times a day  dextrose 5%. 1000 milliLiter(s) IV Continuous <Continuous>  dextrose 5%. 1000 milliLiter(s) IV Continuous <Continuous>  dextrose 50% Injectable 25 Gram(s) IV Push once  dextrose Oral Gel 15 Gram(s) Oral once PRN  enoxaparin Injectable 40 milliGRAM(s) SubCutaneous <User Schedule>  escitalopram 10 milliGRAM(s) Oral daily  glucagon  Injectable 1 milliGRAM(s) IntraMuscular once  hydrALAZINE 100 milliGRAM(s) Oral every 8 hours  insulin glargine Injectable (LANTUS) 46 Unit(s) SubCutaneous at bedtime  insulin lispro (ADMELOG) corrective regimen sliding scale   SubCutaneous three times a day before meals  insulin lispro (ADMELOG) corrective regimen sliding scale   SubCutaneous <User Schedule>  insulin lispro Injectable (ADMELOG) 32 Unit(s) SubCutaneous three times a day before meals  lactulose Syrup 15 Gram(s) Oral <User Schedule>  levothyroxine 88 MICROGram(s) Oral daily  pantoprazole   Suspension 40 milliGRAM(s) Oral daily  polyethylene glycol 3350 17 Gram(s) Oral at bedtime  polyethylene glycol 3350 17 Gram(s) Oral daily PRN  predniSONE   Tablet 40 milliGRAM(s) Oral daily  rosuvastatin 10 milliGRAM(s) Oral at bedtime  senna 2 Tablet(s) Oral at bedtime  sodium chloride 0.9% lock flush 10 milliLiter(s) IV Push every 1 hour PRN  sodium chloride 0.9%. 1000 milliLiter(s) IV Continuous <Continuous>  sodium zirconium cyclosilicate 10 Gram(s) Oral once  trimethoprim / sulfamethoxazole IVPB 320 milliGRAM(s) IV Intermittent every 8 hours  valACYclovir 500 milliGRAM(s) Oral every 12 hours    Current Antimicrobials:  trimethoprim / sulfamethoxazole IVPB 320 milliGRAM(s) IV Intermittent every 8 hours  valACYclovir 500 milliGRAM(s) Oral every 12 hours    Prior/Completed Antimicrobials:  ertapenem  IVPB  piperacillin/tazobactam IVPB.  piperacillin/tazobactam IVPB.-  vancomycin  IVPB

## 2025-01-30 LAB
ALBUMIN SERPL ELPH-MCNC: 2.4 G/DL — LOW (ref 3.3–5)
ALP SERPL-CCNC: 82 U/L — SIGNIFICANT CHANGE UP (ref 40–120)
ALT FLD-CCNC: 25 U/L — SIGNIFICANT CHANGE UP (ref 10–45)
ANION GAP SERPL CALC-SCNC: 8 MMOL/L — SIGNIFICANT CHANGE UP (ref 5–17)
AST SERPL-CCNC: 37 U/L — SIGNIFICANT CHANGE UP (ref 10–40)
BASOPHILS # BLD AUTO: 0.01 K/UL — SIGNIFICANT CHANGE UP (ref 0–0.2)
BASOPHILS NFR BLD AUTO: 0.1 % — SIGNIFICANT CHANGE UP (ref 0–2)
BILIRUB SERPL-MCNC: 0.2 MG/DL — SIGNIFICANT CHANGE UP (ref 0.2–1.2)
BUN SERPL-MCNC: 30 MG/DL — HIGH (ref 7–23)
CALCIUM SERPL-MCNC: 10 MG/DL — SIGNIFICANT CHANGE UP (ref 8.4–10.5)
CHLORIDE SERPL-SCNC: 103 MMOL/L — SIGNIFICANT CHANGE UP (ref 96–108)
CO2 SERPL-SCNC: 23 MMOL/L — SIGNIFICANT CHANGE UP (ref 22–31)
CREAT SERPL-MCNC: 0.95 MG/DL — SIGNIFICANT CHANGE UP (ref 0.5–1.3)
EGFR: 88 ML/MIN/1.73M2 — SIGNIFICANT CHANGE UP
EOSINOPHIL # BLD AUTO: 0 K/UL — SIGNIFICANT CHANGE UP (ref 0–0.5)
EOSINOPHIL NFR BLD AUTO: 0 % — SIGNIFICANT CHANGE UP (ref 0–6)
GLUCOSE BLDC GLUCOMTR-MCNC: 127 MG/DL — HIGH (ref 70–99)
GLUCOSE BLDC GLUCOMTR-MCNC: 148 MG/DL — HIGH (ref 70–99)
GLUCOSE BLDC GLUCOMTR-MCNC: 162 MG/DL — HIGH (ref 70–99)
GLUCOSE BLDC GLUCOMTR-MCNC: 205 MG/DL — HIGH (ref 70–99)
GLUCOSE BLDC GLUCOMTR-MCNC: 70 MG/DL — SIGNIFICANT CHANGE UP (ref 70–99)
GLUCOSE SERPL-MCNC: 61 MG/DL — LOW (ref 70–99)
HCT VFR BLD CALC: 24 % — LOW (ref 39–50)
HGB BLD-MCNC: 7.6 G/DL — LOW (ref 13–17)
IMM GRANULOCYTES NFR BLD AUTO: 6.6 % — HIGH (ref 0–0.9)
LYMPHOCYTES # BLD AUTO: 0.27 K/UL — LOW (ref 1–3.3)
LYMPHOCYTES # BLD AUTO: 2.2 % — LOW (ref 13–44)
MAGNESIUM SERPL-MCNC: 1.7 MG/DL — SIGNIFICANT CHANGE UP (ref 1.6–2.6)
MCHC RBC-ENTMCNC: 28.6 PG — SIGNIFICANT CHANGE UP (ref 27–34)
MCHC RBC-ENTMCNC: 31.7 G/DL — LOW (ref 32–36)
MCV RBC AUTO: 90.2 FL — SIGNIFICANT CHANGE UP (ref 80–100)
MONOCYTES # BLD AUTO: 0.37 K/UL — SIGNIFICANT CHANGE UP (ref 0–0.9)
MONOCYTES NFR BLD AUTO: 3 % — SIGNIFICANT CHANGE UP (ref 2–14)
NEUTROPHILS # BLD AUTO: 10.76 K/UL — HIGH (ref 1.8–7.4)
NEUTROPHILS NFR BLD AUTO: 88.1 % — HIGH (ref 43–77)
NRBC # BLD: 0 /100 WBCS — SIGNIFICANT CHANGE UP (ref 0–0)
NRBC BLD-RTO: 0 /100 WBCS — SIGNIFICANT CHANGE UP (ref 0–0)
PHOSPHATE SERPL-MCNC: 2 MG/DL — LOW (ref 2.5–4.5)
PLATELET # BLD AUTO: 428 K/UL — HIGH (ref 150–400)
POTASSIUM SERPL-MCNC: 5.2 MMOL/L — SIGNIFICANT CHANGE UP (ref 3.5–5.3)
POTASSIUM SERPL-SCNC: 5.2 MMOL/L — SIGNIFICANT CHANGE UP (ref 3.5–5.3)
PROT SERPL-MCNC: 5.1 G/DL — LOW (ref 6–8.3)
RBC # BLD: 2.66 M/UL — LOW (ref 4.2–5.8)
RBC # FLD: 18.5 % — HIGH (ref 10.3–14.5)
SODIUM SERPL-SCNC: 134 MMOL/L — LOW (ref 135–145)
WBC # BLD: 12.21 K/UL — HIGH (ref 3.8–10.5)
WBC # FLD AUTO: 12.21 K/UL — HIGH (ref 3.8–10.5)

## 2025-01-30 PROCEDURE — 99232 SBSQ HOSP IP/OBS MODERATE 35: CPT

## 2025-01-30 PROCEDURE — 99233 SBSQ HOSP IP/OBS HIGH 50: CPT | Mod: GC

## 2025-01-30 PROCEDURE — 99232 SBSQ HOSP IP/OBS MODERATE 35: CPT | Mod: GC

## 2025-01-30 RX ORDER — INSULIN LISPRO 100/ML
5 VIAL (ML) SUBCUTANEOUS
Refills: 0 | Status: DISCONTINUED | OUTPATIENT
Start: 2025-01-30 | End: 2025-01-31

## 2025-01-30 RX ORDER — INSULIN LISPRO 100/ML
7 VIAL (ML) SUBCUTANEOUS
Refills: 0 | Status: DISCONTINUED | OUTPATIENT
Start: 2025-01-30 | End: 2025-01-31

## 2025-01-30 RX ORDER — INSULIN GLARGINE-YFGN 100 [IU]/ML
34 INJECTION, SOLUTION SUBCUTANEOUS AT BEDTIME
Refills: 0 | Status: DISCONTINUED | OUTPATIENT
Start: 2025-01-30 | End: 2025-01-31

## 2025-01-30 RX ORDER — INSULIN LISPRO 100/ML
10 VIAL (ML) SUBCUTANEOUS
Refills: 0 | Status: DISCONTINUED | OUTPATIENT
Start: 2025-01-31 | End: 2025-01-31

## 2025-01-30 RX ORDER — MAGNESIUM SULFATE 0.8 MEQ/ML
2 AMPUL (ML) INJECTION ONCE
Refills: 0 | Status: COMPLETED | OUTPATIENT
Start: 2025-01-30 | End: 2025-01-30

## 2025-01-30 RX ORDER — SOD PHOSPHATE,MONOBASIC-DIBAS 3MMOL/ML
30 VIAL (ML) INTRAVENOUS ONCE
Refills: 0 | Status: COMPLETED | OUTPATIENT
Start: 2025-01-30 | End: 2025-01-30

## 2025-01-30 RX ORDER — INSULIN LISPRO 100/ML
20 VIAL (ML) SUBCUTANEOUS
Refills: 0 | Status: DISCONTINUED | OUTPATIENT
Start: 2025-01-30 | End: 2025-01-30

## 2025-01-30 RX ADMIN — Medication 15 GRAM(S): at 06:28

## 2025-01-30 RX ADMIN — Medication 1 DROP(S): at 22:52

## 2025-01-30 RX ADMIN — PANTOPRAZOLE 40 MILLIGRAM(S): 20 TABLET, DELAYED RELEASE ORAL at 06:56

## 2025-01-30 RX ADMIN — Medication 12.5 MILLIGRAM(S): at 17:44

## 2025-01-30 RX ADMIN — POLYETHYLENE GLYCOL 3350 17 GRAM(S): 17 POWDER, FOR SOLUTION ORAL at 17:42

## 2025-01-30 RX ADMIN — CLONIDINE HYDROCHLORIDE 0.1 MILLIGRAM(S): 0.2 TABLET ORAL at 22:12

## 2025-01-30 RX ADMIN — Medication 85 MILLIMOLE(S): at 11:17

## 2025-01-30 RX ADMIN — PREDNISONE 40 MILLIGRAM(S): 5 TABLET ORAL at 06:27

## 2025-01-30 RX ADMIN — ENOXAPARIN SODIUM 40 MILLIGRAM(S): 100 INJECTION SUBCUTANEOUS at 22:12

## 2025-01-30 RX ADMIN — ROSUVASTATIN CALCIUM 10 MILLIGRAM(S): 10 TABLET, FILM COATED ORAL at 22:12

## 2025-01-30 RX ADMIN — SODIUM ZIRCONIUM CYCLOSILICATE 10 GRAM(S): 5 POWDER, FOR SUSPENSION ORAL at 11:16

## 2025-01-30 RX ADMIN — VALACYCLOVIR 500 MILLIGRAM(S): 1000 TABLET ORAL at 17:43

## 2025-01-30 RX ADMIN — Medication 100 MILLIGRAM(S): at 22:11

## 2025-01-30 RX ADMIN — BUPROPION HYDROCHLORIDE 300 MILLIGRAM(S): 150 TABLET, EXTENDED RELEASE ORAL at 11:17

## 2025-01-30 RX ADMIN — ANTISEPTIC SURGICAL SCRUB 1 APPLICATION(S): 0.04 SOLUTION TOPICAL at 08:50

## 2025-01-30 RX ADMIN — INSULIN GLARGINE-YFGN 34 UNIT(S): 100 INJECTION, SOLUTION SUBCUTANEOUS at 22:38

## 2025-01-30 RX ADMIN — SULFAMETHOXAZOLE AND TRIMETHOPRIM 280 MILLIGRAM(S): 400; 80 TABLET ORAL at 06:25

## 2025-01-30 RX ADMIN — Medication 1 DROP(S): at 00:14

## 2025-01-30 RX ADMIN — Medication 100 MILLIGRAM(S): at 13:48

## 2025-01-30 RX ADMIN — Medication 7 UNIT(S): at 17:43

## 2025-01-30 RX ADMIN — Medication 1 DROP(S): at 06:26

## 2025-01-30 RX ADMIN — ESCITALOPRAM 10 MILLIGRAM(S): 10 TABLET, FILM COATED ORAL at 11:17

## 2025-01-30 RX ADMIN — LEVOTHYROXINE SODIUM 88 MICROGRAM(S): 25 TABLET ORAL at 06:26

## 2025-01-30 RX ADMIN — Medication 25 GRAM(S): at 08:41

## 2025-01-30 RX ADMIN — SULFAMETHOXAZOLE AND TRIMETHOPRIM 280 MILLIGRAM(S): 400; 80 TABLET ORAL at 22:12

## 2025-01-30 RX ADMIN — Medication 1 DROP(S): at 17:44

## 2025-01-30 RX ADMIN — Medication 15 GRAM(S): at 17:42

## 2025-01-30 RX ADMIN — Medication 5 UNIT(S): at 12:57

## 2025-01-30 RX ADMIN — CLONIDINE HYDROCHLORIDE 0.1 MILLIGRAM(S): 0.2 TABLET ORAL at 13:47

## 2025-01-30 RX ADMIN — VALACYCLOVIR 500 MILLIGRAM(S): 1000 TABLET ORAL at 06:27

## 2025-01-30 RX ADMIN — Medication 4: at 17:43

## 2025-01-30 RX ADMIN — Medication 2 TABLET(S): at 22:12

## 2025-01-30 RX ADMIN — Medication 1 DROP(S): at 11:17

## 2025-01-30 RX ADMIN — SULFAMETHOXAZOLE AND TRIMETHOPRIM 280 MILLIGRAM(S): 400; 80 TABLET ORAL at 13:47

## 2025-01-30 NOTE — PROGRESS NOTE ADULT - PROBLEM SELECTOR PLAN 1
INPATIENT PLAN:  - Check BG TID AC, HS, and 2AM  - Adjust Lantus to 34u QHS  - Adjust Admelog 10 units with breakfast, 5u with lunch, 5U with dinner (HOLD if NPO or not eating meal)   - C/w moderate dose Admelog correctional scales TID AC, HS, and start 2AM  - Please keep hypoglycemia protocol in place     DISCHARGE PLANNING:  - Discharge recs pending clinical course and depending on steroids. Will need basal/bolus (doses TBD).   - Endocrine follow up: can f/u wit his Endocrinologist Dr. Romero.   - Recommend routine outpatient ophthalmology and podiatry follow up.

## 2025-01-30 NOTE — PROGRESS NOTE ADULT - PROBLEM SELECTOR PLAN 3
Course currently c/b steroid induced hyperglycemia     -Endocrinology following, recs appreciated  -C/w Lantus QHS, Admelog TID before meals (HOLD if NPO or not eating). Taper down preemptively if steroids are being reduced to avoid hypoglycemia.   - Recommend moderate dose admelog correction scale TIDQAC and separate moderate dose scale QHS  - POCT glucose before meals and QHS, or q6h while NPO  - Inpatient glucose goals: <140 pre-meal, <180 random  - consistent carb diet

## 2025-01-30 NOTE — PROGRESS NOTE ADULT - PROBLEM SELECTOR PLAN 5
AT RISK FOR ADRENAL INSUFFIENCEY IF HYPOTENSIVE 50mg hydrocortisone IV q8  s/p Hydrocortisone 25 mg IV, now off--on pred taper for PJP PNA  1/26 D/C Tacrolimus and Lisinopril due to DIMITRIOS and PCP.   Transplant nephro / Transplant ID following    UOP improved after IVF  K improved to 5.2

## 2025-01-30 NOTE — PROGRESS NOTE ADULT - PROBLEM SELECTOR PLAN 5
- LDL goal <70  - on rosuvastatin 10 mg daily  - check lipid panel as outpatient on a yearly basis      Contact via Microsoft Teams during business hours  To reach covering provider access AMION via sunrise tools  For Urgent matters/after-hours/weekends/holidays please page endocrine fellow on call   For nonurgent matters please email [email or web address]    Please note that this patient may be followed by different provider tomorrow.  Notify endocrine 24 hours prior to discharge for final recommendations

## 2025-01-30 NOTE — PROGRESS NOTE ADULT - ASSESSMENT
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 Endocrinology consulted for hyperglycemia.   BG Goal 100-180mg/dl   Tolerating POs, eats full meals, ate Glucerna, crambled egg and potatoes for breakfast. Noted tightly controlled BG ( 70s ) last night and hypoglycemia ( Serum BG 61 and POC BG 70) this am. Denies having hypoglycemia symptoms.   As per pt, he does not usually have hypoglycemia but passes out when it is low. He uses Dexcom G6 at home with reader. He doesn't have a smart phone.   Currently on Prednisone 40 mg daily until 2/2. Then decrease to Prednisone 20 mg daily for 11 days   Will decrease insulin doses preventively           #Uncontrolled Type 2 Diabetes Mellitus with  #Steroid-induced hyperglycemia  - Follows with: Dr. Romero  - A1C with Estimated Average Glucose Result: 7.8 % (12-29-24)  - home regimen: Came from rehab, there he was on Lantus 38 units, Admelog 10 units with correction scale ( as per pt, he usually get Lantus 10-15 units BID and premeal insulin per sliding scale; usually 5-6 units at home, and uses Dexcom G6 with reader )   - eGFR: 70 mL/min/1.73m2 (01-26-25)  - glucose grossly elevated, no evidence of DKA on labs    Steroid schedule   Prednisone 40 mg BID  (1/24-1/28)  Prednisone 40 mg daily ( 1/29-2/2)  Prednisone 20 mg daily ( 2/3- 2/13)

## 2025-01-30 NOTE — PROGRESS NOTE ADULT - ASSESSMENT
68 y/o M PMHx renal transplant 2012 (2/2 DM, s/p left nephrectomy), peripheral neuropathy, hypothyroidism, retroperitoneal fibrosis, HTN, HLD, GERD, anxiety/depression, ANGELIKA and recent admission at [facility] for sacral osteomyelitis and ESBL.coli urosepsis discharged on 12/18/24 to rehab. While admitted to [facility] was noted to have hypercalcemia and liver masses which were biopsied on 12/16/24, biopsy revealed DLBCL. Patient received R mini CHOP on 12/28 now admitted for cycle #2 R-mini CHOP, upon admission patient is febrile and Chemotherapy is on HOLD. Pt has been pancytopenic secondary to chemotherapy as well as infection. Course c/b AHRF and c/f PJP Pneumonia.         #DLBCL   Admitted  for R mini CHOP, found to be febrile on admission, held  chemo   Plans for any chemo or tafasitamab/lenalidomide are now ON HOLD given severe infections and fevers.  1/25 stable on HFNC. cont to hold planned therapy due to suspected PCP/PJC pneumonia  1/27 stable on HFNC, O2 requirements less, now 50% FiO2 and 50 LPM. No further plan for chemo and/or immunotherapy at present. Request made to transfer to medicine floor with Hospitalist coverage - accepted by  1/28 Medicine service Doug Sanchez will take over care when pt moved to [address] [place] [address].   1/30 Currently on NC 2L. Labs today- WBC 12.21, Hgb 7.6, Plt 428  - Daily CBC w diff, transfuse to keep Hgb>7, Platelet > 10k, if bleeding keep platelet > 50k    # AHRF  -wean O2 as tolerated currently on 2L NC  -f/u sputum culture and sputum for PCP PCR if able to expectorate   -f/u aspergillus Ag, in process   -Bactrim 320mg IV Q8h (based on pts weight) - plan for 21d course   -c/w Prednisone 40mg PO BID x5d until 1/28 then 40mg daily x5d (1/29-2/2), then 20mg daily for 11 days (2/3-2/13)  -Continue on valtrex ppx  - Monitor temps/CBC.  - on Bactrim 320mg IV Q8h (based on pts weight) - plan for 21d course   -c/w Prednisone 40mg PO BID x5d until 1/28 then 40mg daily x5d (1/29-2/2), then 20mg daily for 11 days (2/3-2/13)    # Kidney transplant  T RISK FOR ADRENAL INSUFFIENCEY IF HYPOTENSIVE 50mg hydrocortisone IV q8  s/p Hydrocortisone 25 mg IV, now off--on pred taper for PJP PNA  1/26 D/C Tacrolimus and Lisinopril due to DIMITRIOS and PCP.   Transplant nephro / Transplant ID following 70 y/o M PMHx renal transplant 2012 (2/2 DM, s/p left nephrectomy), peripheral neuropathy, hypothyroidism, retroperitoneal fibrosis, HTN, HLD, GERD, anxiety/depression, ANGELIKA and recent admission at [facility] for sacral osteomyelitis and ESBL.coli urosepsis discharged on 12/18/24 to rehab. While admitted to [facility] was noted to have hypercalcemia and liver masses which were biopsied on 12/16/24, biopsy revealed DLBCL. Patient received R mini CHOP on 12/28 now admitted for cycle #2 R-mini CHOP, upon admission patient is febrile and Chemotherapy is on HOLD. Pt has been pancytopenic secondary to chemotherapy as well as infection. Course c/b AHRF and c/f PJP Pneumonia.         #DLBCL   Admitted  for R mini CHOP, found to be febrile on admission, held  chemo   Plans for any chemo or tafasitamab/lenalidomide are now ON HOLD given severe infections and fevers.  1/25 stable on HFNC. cont to hold planned therapy due to suspected PCP/PJC pneumonia  1/27 stable on HFNC, O2 requirements less, now 50% FiO2 and 50 LPM. No further plan for chemo and/or immunotherapy at present. Request made to transfer to medicine floor with Hospitalist coverage - accepted by  1/28 Medicine service Doug Sanchez will take over care when pt moved to [address] [place] [address].   1/30 Currently on NC 2L. Labs today- WBC 12.21, Hgb 7.6, Plt 428  - Daily CBC w diff, transfuse to keep Hgb>7, Platelet > 10k, if bleeding keep platelet > 50k    # AHRF  -wean O2 as tolerated currently on 2L NC  -f/u sputum culture and sputum for PCP PCR if able to expectorate   -f/u aspergillus Ag, in process   -Bactrim 320mg IV Q8h (based on pts weight) - plan for 21d course   -c/w Prednisone 40mg PO BID x5d until 1/28 then 40mg daily x5d (1/29-2/2), then 20mg daily for 11 days (2/3-2/13)  -Continue on valtrex ppx  - Monitor temps/CBC.  - on Bactrim 320mg IV Q8h (based on pts weight) - plan for 21d course   -c/w Prednisone 40mg PO BID x5d until 1/28 then 40mg daily x5d (1/29-2/2), then 20mg daily for 11 days (2/3-2/13)    # Kidney transplant  T RISK FOR ADRENAL INSUFFIENCEY IF HYPOTENSIVE 50mg hydrocortisone IV q8  s/p Hydrocortisone 25 mg IV, now off--on pred taper for PJP PNA  1/26 D/C Tacrolimus and Lisinopril due to DIMITRIOS and PCP.   Transplant nephro / Transplant ID following      ---incomplete note--- 69-year-old male with PMHx of DM, HTN, HLD, ESRD on HD s/p LKRT 2012 (from brother), hypothyroidism, recent admission to [facility] 11/30/24-12/18/24 for AMS found to have sacral OM and E. Coli bacteremia s/p treatment with meropenem till 12/10, complicated by hypercalcemia  (s/p calcitonin, aredia, and started on prednisone by nephrology), found to have new DLBCL on liver biopsy 12/16/24 transferred to [facility] from rehab to start chemo with  Mini R-CHOP. S/p C1 RCHOP on 12/2024 (rituxan on 12/28/24 and CHOP on 12/29/24). Pt was discharged on 1/1/25. Pt was readmitted to [facility] on 1/17/25now admitted for cycle #2 R-mini CHOP, upon admission patient is febrile and Chemotherapy was held. Pt has been pancytopenic secondary to chemotherapy as well as infection. Course c/b AHRF and c/f PJP Pneumonia.       #DLBCL   Admitted  for R mini CHOP, found to be febrile on admission, held  chemo   Plans for any chemo or tafasitamab/lenalidomide are now ON HOLD given severe infections and fevers.  1/25 stable on HFNC. cont to hold planned therapy due to suspected PCP/PJC pneumonia  1/27 stable on HFNC, O2 requirements less, now 50% FiO2 and 50 LPM. No further plan for chemo and/or immunotherapy at present. Request made to transfer to medicine floor with Hospitalist coverage - accepted by  1/28 Medicine service Doug Sanchez will take over care when pt moved to [address] [place] [address].   1/30 Currently on NC 2L. Labs today- WBC 12.21, Hgb 7.6, Plt 428  - Daily CBC w diff, transfuse to keep Hgb>7, Platelet > 10k, if bleeding keep platelet > 50k    # AHRF  # PJP Pneumonia  s/p zosyn 1/17-1/20  s/p ertapenem 1/20- 1/24  s/p vanc x1 on 1/24  s/p meropenem 1/24-1/26   - started IV bactrim + prednisone 1/24-  - ID following, recs appreciated  -Bactrim 320mg IV Q8h (based on pts weight) - plan for 21d course (1/24-2/13)  -s/p Prednisone 40mg PO BID x5d until 1/28 , c/w 40mg daily x5d (1/29-2/2), then 20mg daily for 11 days (2/3-2/13)  -Continue on valtrex ppx  -wean O2 as tolerated currently on 2L NC  -f/u sputum culture and sputum for PCP PCR if able to expectorate   -f/u aspergillus Ag, in process     # Kidney transplant  T RISK FOR ADRENAL INSUFFIENCEY IF HYPOTENSIVE 50mg hydrocortisone IV q8  s/p Hydrocortisone 25 mg IV, now off--on pred taper for PJP PNA  1/26 D/C Tacrolimus and Lisinopril due to DIMITRIOS and PCP.   Transplant nephro / Transplant ID following      ---incomplete note--- 69-year-old male with PMHx of DM, HTN, HLD, ESRD on HD s/p LKRT 2012 (from brother), hypothyroidism, recent admission to [facility] 11/30/24-12/18/24 for AMS found to have sacral OM and E. Coli bacteremia s/p treatment with meropenem till 12/10, complicated by hypercalcemia  (s/p calcitonin, aredia, and started on prednisone by nephrology), found to have new DLBCL on liver biopsy 12/16/24 transferred to [facility] from rehab to start chemo with  Mini R-CHOP. S/p C1 RCHOP on 12/2024 (rituxan on 12/28/24 and CHOP on 12/29/24). Pt was discharged on 1/1/25. Pt was readmitted to [facility] on 1/17/25now admitted for cycle #2 R-mini CHOP, upon admission patient is febrile and Chemotherapy was held. Pt has been pancytopenic secondary to chemotherapy as well as infection. Course c/b AHRF and c/f PJP Pneumonia.       #DLBCL   Admitted  for R mini CHOP, found to be febrile on admission, held  chemo   Plans for any chemo or tafasitamab/lenalidomide are now ON HOLD given severe infections and fevers.  1/25 stable on HFNC. cont to hold planned therapy due to suspected PCP/PJC pneumonia  1/27 stable on HFNC, O2 requirements less, now 50% FiO2 and 50 LPM. No further plan for chemo and/or immunotherapy at present. Request made to transfer to medicine floor with Hospitalist coverage - accepted by  1/28 Medicine service Doug Sanchez will take over care when pt moved to [address] [place] [address].   1/30 Currently on NC 2L. Labs today- WBC 12.21, Hgb 7.6, Plt 428  - Daily CBC w diff, transfuse to keep Hgb>7, Platelet > 10k, if bleeding keep platelet > 50k    # AHRF  # PJP Pneumonia  s/p zosyn 1/17-1/20  s/p ertapenem 1/20- 1/24  s/p vanc x1 on 1/24  s/p meropenem 1/24-1/26   - started IV bactrim + prednisone 1/24-  - ID following, recs appreciated  -Bactrim 320mg IV Q8h (based on pts weight) - plan for 21d course (1/24-2/13)  -s/p Prednisone 40mg PO BID x5d until 1/28 , c/w 40mg daily x5d (1/29-2/2), then 20mg daily for 11 days (2/3-2/13)  -Continue on valtrex ppx  -wean O2 as tolerated currently on 2L NC  -f/u sputum culture and sputum for PCP PCR if able to expectorate   -f/u aspergillus Ag, in process   - Patient to follow up at [facility] with Dr. Medina regarding further chemotherapy. No plan for inpatient chemotherapy. Kindly inform us prior to discharge to setup outpatient hematology appointment.      # Kidney transplant  T RISK FOR ADRENAL INSUFFIENCEY IF HYPOTENSIVE 50mg hydrocortisone IV q8  s/p Hydrocortisone 25 mg IV, now off--on pred taper for PJP PNA  1/26 D/C Tacrolimus and Lisinopril due to DIMITRIOS and PCP.   Transplant nephro / Transplant ID following

## 2025-01-30 NOTE — PROGRESS NOTE ADULT - SUBJECTIVE AND OBJECTIVE BOX
[facility] DIVISION OF KIDNEY DISEASES AND HYPERTENSION -- FOLLOW UP NOTE  --------------------------------------------------------------------------------  Chief Complaint: LRRT    24 hour events/subjective: Pt seen and evaluated this morning. Reports no complaints, and feeling better.    PAST HISTORY  --------------------------------------------------------------------------------  No significant changes to PMH, PSH, FHx, SHx, unless otherwise noted    ALLERGIES & MEDICATIONS  --------------------------------------------------------------------------------  Allergies    No Known Allergies    Intolerances    Standing Inpatient Medications  amLODIPine   Tablet 10 milliGRAM(s) Oral daily  artificial tears (preservative free) Ophthalmic Solution 1 Drop(s) Both EYES every 6 hours  buPROPion XL (24-Hour) . 300 milliGRAM(s) Oral daily  carvedilol 12.5 milliGRAM(s) Oral every 12 hours  chlorhexidine 4% Liquid 1 Application(s) Topical <User Schedule>  cloNIDine 0.1 milliGRAM(s) Oral three times a day  dextrose 5%. 1000 milliLiter(s) IV Continuous <Continuous>  dextrose 5%. 1000 milliLiter(s) IV Continuous <Continuous>  dextrose 50% Injectable 25 Gram(s) IV Push once  enoxaparin Injectable 40 milliGRAM(s) SubCutaneous <User Schedule>  escitalopram 10 milliGRAM(s) Oral daily  glucagon  Injectable 1 milliGRAM(s) IntraMuscular once  hydrALAZINE 100 milliGRAM(s) Oral every 8 hours  insulin glargine Injectable (LANTUS) 34 Unit(s) SubCutaneous at bedtime  insulin lispro (ADMELOG) corrective regimen sliding scale   SubCutaneous <User Schedule>  insulin lispro (ADMELOG) corrective regimen sliding scale   SubCutaneous three times a day before meals  insulin lispro Injectable (ADMELOG) 20 Unit(s) SubCutaneous before dinner  insulin lispro Injectable (ADMELOG) 5 Unit(s) SubCutaneous with lunch  insulin lispro Injectable (ADMELOG) 35 Unit(s) SubCutaneous before breakfast  lactulose Syrup 15 Gram(s) Oral every 12 hours  levothyroxine 88 MICROGram(s) Oral daily  pantoprazole    Tablet 40 milliGRAM(s) Oral before breakfast  polyethylene glycol 3350 17 Gram(s) Oral every 12 hours  predniSONE   Tablet 40 milliGRAM(s) Oral daily  rosuvastatin 10 milliGRAM(s) Oral at bedtime  senna 2 Tablet(s) Oral at bedtime  sodium zirconium cyclosilicate 10 Gram(s) Oral daily  trimethoprim / sulfamethoxazole IVPB 320 milliGRAM(s) IV Intermittent every 8 hours  valACYclovir 500 milliGRAM(s) Oral every 12 hours    PRN Inpatient Medications  acetaminophen     Tablet .. 650 milliGRAM(s) Oral every 6 hours PRN  dextrose Oral Gel 15 Gram(s) Oral once PRN  sodium chloride 0.9% lock flush 10 milliLiter(s) IV Push every 1 hour PRN    REVIEW OF SYSTEMS  --------------------------------------------------------------------------------  see above    VITALS/PHYSICAL EXAM  --------------------------------------------------------------------------------  T(C): 36.8 (01-30-25 @ 12:09), Max: 36.8 (01-30-25 @ 04:20)  HR: 69 (01-30-25 @ 12:09) (63 - 69)  BP: 126/60 (01-30-25 @ 12:09) (116/60 - 135/56)  RR: 18 (01-30-25 @ 12:09) (18 - 18)  SpO2: 96% (01-30-25 @ 12:09) (95% - 96%)  Wt(kg): --    01-29-25 @ 07:01  -  01-30-25 @ 07:00  --------------------------------------------------------  IN: 3090 mL / OUT: 2900 mL / NET: 190 mL    01-30-25 @ 07:01  -  01-30-25 @ 16:08  --------------------------------------------------------  IN: 0 mL / OUT: 675 mL / NET: -675 mL    Physical Exam:  Gen: NAD  HEENT: MMM  Pulm: CTAB  CV: S1S2  Abd: Soft, +BS   Ext: No LE edema B/L  Neuro: Awake  Skin: Warm and dry  Vascular access: peripheral IVs      LABS/STUDIES  --------------------------------------------------------------------------------              7.6    12.21 >-----------<  428      [01-30-25 @ 07:05]              24.0     134  |  103  |  30  ----------------------------<  61      [01-30-25 @ 07:06]  5.2   |  23  |  0.95        Ca     10.0     [01-30-25 @ 07:06]      Mg     1.7     [01-30-25 @ 07:06]      Phos  2.0     [01-30-25 @ 07:06]    TPro  5.1  /  Alb  2.4  /  TBili  0.2  /  DBili  x   /  AST  37  /  ALT  25  /  AlkPhos  82  [01-30-25 @ 07:06]    Creatinine Trend:  SCr 0.95 [01-30 @ 07:06]  SCr 1.05 [01-29 @ 05:02]  SCr 1.12 [01-28 @ 07:34]  SCr 0.87 [01-27 @ 07:00]  SCr 1.01 [01-26 @ 17:42]    Iron 16, TIBC 182, %sat 9      [12-05-24 @ 10:00]  Ferritin 973      [12-05-24 @ 10:00]  PTH -- (Ca 11.2)      [12-28-24 @ 10:06]   14  PTH -- (Ca --)      [12-02-24 @ 11:50]   11  Vitamin D (25OH) 39.8      [12-28-24 @ 10:06]  HbA1c 6.9      [02-04-20 @ 11:12]  TSH 0.55      [12-31-24 @ 07:04]

## 2025-01-30 NOTE — PROGRESS NOTE ADULT - PROBLEM SELECTOR PLAN 1
Admitted under heme onc service for R mini CHOP, found to be febrile on admission, plans for any chemo or tafasitamab/lenalidomide are now ON HOLD iso AHRF and c/f PJP PNA  Monitor CBC with diff, transfuse as needed to maintain Hb >7  Monitor electrolytes, replete as needed.  Daily weights.Strict I/O. mouth care Admitted under heme onc service for R mini CHOP, found to be febrile on admission, plans for any chemo or tafasitamab/lenalidomide are now ON HOLD iso AHRF and c/f PJP PNA  Monitor CBC with diff, transfuse as needed to maintain Hb >7  Monitor electrolytes, replete as needed.  Daily weights.Strict I/O. mouth care    - consult onc for chemo treatment plan

## 2025-01-30 NOTE — PROGRESS NOTE ADULT - SUBJECTIVE AND OBJECTIVE BOX
PROGRESS NOTE:   Authored by: Aleida Quintanilla MS4   Please contact via Teams.     Patient is a 69y old  Male who presents with a chief complaint of Chemo, hyperglycemia (29 Jan 2025 10:37)      SUBJECTIVE / OVERNIGHT EVENTS:  No acute events overnight.     Tele:    Brief Daily Plan:    MEDICATIONS  (STANDING):  amLODIPine   Tablet 10 milliGRAM(s) Oral daily  artificial tears (preservative free) Ophthalmic Solution 1 Drop(s) Both EYES every 6 hours  buPROPion XL (24-Hour) . 300 milliGRAM(s) Oral daily  carvedilol 12.5 milliGRAM(s) Oral every 12 hours  chlorhexidine 4% Liquid 1 Application(s) Topical <User Schedule>  cloNIDine 0.1 milliGRAM(s) Oral three times a day  dextrose 5%. 1000 milliLiter(s) (50 mL/Hr) IV Continuous <Continuous>  dextrose 5%. 1000 milliLiter(s) (100 mL/Hr) IV Continuous <Continuous>  dextrose 50% Injectable 25 Gram(s) IV Push once  enoxaparin Injectable 40 milliGRAM(s) SubCutaneous <User Schedule>  escitalopram 10 milliGRAM(s) Oral daily  glucagon  Injectable 1 milliGRAM(s) IntraMuscular once  hydrALAZINE 100 milliGRAM(s) Oral every 8 hours  insulin glargine Injectable (LANTUS) 48 Unit(s) SubCutaneous at bedtime  insulin lispro (ADMELOG) corrective regimen sliding scale   SubCutaneous <User Schedule>  insulin lispro (ADMELOG) corrective regimen sliding scale   SubCutaneous three times a day before meals  insulin lispro Injectable (ADMELOG) 30 Unit(s) SubCutaneous before dinner  insulin lispro Injectable (ADMELOG) 35 Unit(s) SubCutaneous before breakfast  insulin lispro Injectable (ADMELOG) 32 Unit(s) SubCutaneous before lunch  lactulose Syrup 15 Gram(s) Oral every 12 hours  levothyroxine 88 MICROGram(s) Oral daily  magnesium sulfate  IVPB 2 Gram(s) IV Intermittent once  pantoprazole    Tablet 40 milliGRAM(s) Oral before breakfast  polyethylene glycol 3350 17 Gram(s) Oral every 12 hours  predniSONE   Tablet 40 milliGRAM(s) Oral daily  rosuvastatin 10 milliGRAM(s) Oral at bedtime  senna 2 Tablet(s) Oral at bedtime  sodium chloride 0.9%. 1000 milliLiter(s) (120 mL/Hr) IV Continuous <Continuous>  sodium phosphate 30 milliMole(s)/500 mL IVPB 30 milliMole(s) IV Intermittent once  sodium zirconium cyclosilicate 10 Gram(s) Oral daily  trimethoprim / sulfamethoxazole IVPB 320 milliGRAM(s) IV Intermittent every 8 hours  valACYclovir 500 milliGRAM(s) Oral every 12 hours    MEDICATIONS  (PRN):  acetaminophen     Tablet .. 650 milliGRAM(s) Oral every 6 hours PRN Temp greater or equal to 38C (100.4F), Mild Pain (1 - 3)  dextrose Oral Gel 15 Gram(s) Oral once PRN Blood Glucose LESS THAN 70 milliGRAM(s)/deciliter  sodium chloride 0.9% lock flush 10 milliLiter(s) IV Push every 1 hour PRN Pre/post blood products, medications, blood draw, and to maintain line patency      CAPILLARY BLOOD GLUCOSE      POCT Blood Glucose.: 162 mg/dL (30 Jan 2025 01:58)  POCT Blood Glucose.: 82 mg/dL (29 Jan 2025 22:17)  POCT Blood Glucose.: 77 mg/dL (29 Jan 2025 22:16)  POCT Blood Glucose.: 76 mg/dL (29 Jan 2025 21:47)  POCT Blood Glucose.: 79 mg/dL (29 Jan 2025 21:24)  POCT Blood Glucose.: 158 mg/dL (29 Jan 2025 17:09)  POCT Blood Glucose.: 207 mg/dL (29 Jan 2025 12:30)  POCT Blood Glucose.: 320 mg/dL (29 Jan 2025 08:41)    I&O's Summary    29 Jan 2025 07:01  -  30 Jan 2025 07:00  --------------------------------------------------------  IN: 3090 mL / OUT: 2900 mL / NET: 190 mL        PHYSICAL EXAM:  Vital Signs Last 24 Hrs  T(C): 36.8 (30 Jan 2025 04:20), Max: 36.9 (29 Jan 2025 11:58)  T(F): 98.2 (30 Jan 2025 04:20), Max: 98.5 (29 Jan 2025 11:58)  HR: 65 (30 Jan 2025 04:20) (63 - 75)  BP: 135/56 (30 Jan 2025 04:20) (113/58 - 146/70)  BP(mean): --  RR: 18 (30 Jan 2025 04:20) (18 - 18)  SpO2: 95% (30 Jan 2025 04:20) (95% - 97%)    Parameters below as of 30 Jan 2025 04:20  Patient On (Oxygen Delivery Method): nasal cannula        CONSTITUTIONAL: NAD, well-developed  RESPIRATORY: Normal respiratory effort; lungs are clear to auscultation bilaterally  CARDIOVASCULAR: Regular rate and rhythm, normal S1 and S2, no murmur/rub/gallop; No lower extremity edema; Peripheral pulses are 2+ bilaterally  ABDOMEN: Nontender to palpation, normoactive bowel sounds, no rebound/guarding; No hepatosplenomegaly  MUSCLOSKELETAL: no clubbing or cyanosis of digits; no joint swelling or tenderness to palpation  PSYCH: A+O to person, place, and time; affect appropriate    LABS:                        7.6    12.21 )-----------( 428      ( 30 Jan 2025 07:05 )             24.0     01-30    134[L]  |  103  |  30[H]  ----------------------------<  61[L]  5.2   |  23  |  0.95    Ca    10.0      30 Jan 2025 07:06  Phos  2.0     01-30  Mg     1.7     01-30    TPro  5.1[L]  /  Alb  2.4[L]  /  TBili  0.2  /  DBili  x   /  AST  37  /  ALT  25  /  AlkPhos  82  01-30            MICRO:  Urinalysis Basic - ( 30 Jan 2025 07:06 )    Color: x / Appearance: x / SG: x / pH: x  Gluc: 61 mg/dL / Ketone: x  / Bili: x / Urobili: x   Blood: x / Protein: x / Nitrite: x   Leuk Esterase: x / RBC: x / WBC x   Sq Epi: x / Non Sq Epi: x / Bacteria: x     PROGRESS NOTE:   Authored by: Aleida Quintanilla MS4   Please contact via Teams.     Patient is a 69y old  Male who presents with a chief complaint of Chemo, hyperglycemia (29 Jan 2025 10:37)      SUBJECTIVE / OVERNIGHT EVENTS:  No acute events overnight.     MEDICATIONS  (STANDING):  amLODIPine   Tablet 10 milliGRAM(s) Oral daily  artificial tears (preservative free) Ophthalmic Solution 1 Drop(s) Both EYES every 6 hours  buPROPion XL (24-Hour) . 300 milliGRAM(s) Oral daily  carvedilol 12.5 milliGRAM(s) Oral every 12 hours  chlorhexidine 4% Liquid 1 Application(s) Topical <User Schedule>  cloNIDine 0.1 milliGRAM(s) Oral three times a day  dextrose 5%. 1000 milliLiter(s) (50 mL/Hr) IV Continuous <Continuous>  dextrose 5%. 1000 milliLiter(s) (100 mL/Hr) IV Continuous <Continuous>  dextrose 50% Injectable 25 Gram(s) IV Push once  enoxaparin Injectable 40 milliGRAM(s) SubCutaneous <User Schedule>  escitalopram 10 milliGRAM(s) Oral daily  glucagon  Injectable 1 milliGRAM(s) IntraMuscular once  hydrALAZINE 100 milliGRAM(s) Oral every 8 hours  insulin glargine Injectable (LANTUS) 48 Unit(s) SubCutaneous at bedtime  insulin lispro (ADMELOG) corrective regimen sliding scale   SubCutaneous <User Schedule>  insulin lispro (ADMELOG) corrective regimen sliding scale   SubCutaneous three times a day before meals  insulin lispro Injectable (ADMELOG) 30 Unit(s) SubCutaneous before dinner  insulin lispro Injectable (ADMELOG) 35 Unit(s) SubCutaneous before breakfast  insulin lispro Injectable (ADMELOG) 32 Unit(s) SubCutaneous before lunch  lactulose Syrup 15 Gram(s) Oral every 12 hours  levothyroxine 88 MICROGram(s) Oral daily  magnesium sulfate  IVPB 2 Gram(s) IV Intermittent once  pantoprazole    Tablet 40 milliGRAM(s) Oral before breakfast  polyethylene glycol 3350 17 Gram(s) Oral every 12 hours  predniSONE   Tablet 40 milliGRAM(s) Oral daily  rosuvastatin 10 milliGRAM(s) Oral at bedtime  senna 2 Tablet(s) Oral at bedtime  sodium chloride 0.9%. 1000 milliLiter(s) (120 mL/Hr) IV Continuous <Continuous>  sodium phosphate 30 milliMole(s)/500 mL IVPB 30 milliMole(s) IV Intermittent once  sodium zirconium cyclosilicate 10 Gram(s) Oral daily  trimethoprim / sulfamethoxazole IVPB 320 milliGRAM(s) IV Intermittent every 8 hours  valACYclovir 500 milliGRAM(s) Oral every 12 hours    MEDICATIONS  (PRN):  acetaminophen     Tablet .. 650 milliGRAM(s) Oral every 6 hours PRN Temp greater or equal to 38C (100.4F), Mild Pain (1 - 3)  dextrose Oral Gel 15 Gram(s) Oral once PRN Blood Glucose LESS THAN 70 milliGRAM(s)/deciliter  sodium chloride 0.9% lock flush 10 milliLiter(s) IV Push every 1 hour PRN Pre/post blood products, medications, blood draw, and to maintain line patency      CAPILLARY BLOOD GLUCOSE      POCT Blood Glucose.: 162 mg/dL (30 Jan 2025 01:58)  POCT Blood Glucose.: 82 mg/dL (29 Jan 2025 22:17)  POCT Blood Glucose.: 77 mg/dL (29 Jan 2025 22:16)  POCT Blood Glucose.: 76 mg/dL (29 Jan 2025 21:47)  POCT Blood Glucose.: 79 mg/dL (29 Jan 2025 21:24)  POCT Blood Glucose.: 158 mg/dL (29 Jan 2025 17:09)  POCT Blood Glucose.: 207 mg/dL (29 Jan 2025 12:30)  POCT Blood Glucose.: 320 mg/dL (29 Jan 2025 08:41)    I&O's Summary    29 Jan 2025 07:01  -  30 Jan 2025 07:00  --------------------------------------------------------  IN: 3090 mL / OUT: 2900 mL / NET: 190 mL        PHYSICAL EXAM:  Vital Signs Last 24 Hrs  T(C): 36.8 (30 Jan 2025 04:20), Max: 36.9 (29 Jan 2025 11:58)  T(F): 98.2 (30 Jan 2025 04:20), Max: 98.5 (29 Jan 2025 11:58)  HR: 65 (30 Jan 2025 04:20) (63 - 75)  BP: 135/56 (30 Jan 2025 04:20) (113/58 - 146/70)  BP(mean): --  RR: 18 (30 Jan 2025 04:20) (18 - 18)  SpO2: 95% (30 Jan 2025 04:20) (95% - 97%)    Parameters below as of 30 Jan 2025 04:20  Patient On (Oxygen Delivery Method): nasal cannula        CONSTITUTIONAL: NAD, well-developed  RESPIRATORY: Normal respiratory effort; lungs are clear to auscultation bilaterally  CARDIOVASCULAR: Regular rate and rhythm, normal S1 and S2, no murmur/rub/gallop; No lower extremity edema; Peripheral pulses are 2+ bilaterally  ABDOMEN: Nontender to palpation, normoactive bowel sounds, no rebound/guarding; No hepatosplenomegaly  MUSCLOSKELETAL: no clubbing or cyanosis of digits; no joint swelling or tenderness to palpation  PSYCH: A+O to person, place, and time; affect appropriate    LABS:                        7.6    12.21 )-----------( 428      ( 30 Jan 2025 07:05 )             24.0     01-30    134[L]  |  103  |  30[H]  ----------------------------<  61[L]  5.2   |  23  |  0.95    Ca    10.0      30 Jan 2025 07:06  Phos  2.0     01-30  Mg     1.7     01-30    TPro  5.1[L]  /  Alb  2.4[L]  /  TBili  0.2  /  DBili  x   /  AST  37  /  ALT  25  /  AlkPhos  82  01-30            MICRO:  Urinalysis Basic - ( 30 Jan 2025 07:06 )    Color: x / Appearance: x / SG: x / pH: x  Gluc: 61 mg/dL / Ketone: x  / Bili: x / Urobili: x   Blood: x / Protein: x / Nitrite: x   Leuk Esterase: x / RBC: x / WBC x   Sq Epi: x / Non Sq Epi: x / Bacteria: x

## 2025-01-30 NOTE — PROGRESS NOTE ADULT - ASSESSMENT
Patient is a 69 year old male with PMH of renal transplant 2012 (2/2 DM, s/p left nephrectomy), peripheral neuropathy, hypothyroidism, retroperitoneal fibrosis, HTN, HLD, GERD, anxiety/depression, ANGELIKA and recent admission at [facility] for ESBL E.coli urosepsis, imaging with ?sacral OM though pt with no sacral wound suspected findings likely related to mets, he was discharged on 12/18/24 to rehab, recently diagnosed DLBCL while admitted to [place] when he was noted to have hypercalcemia and liver masses s/p biopsy 12/16/24, now s/p R mini CHOP on 12/28 who was admitted 1/17 for cycle #2 Rmini CHOP, upon admission patient febrile and chemotherapy was held for now.  Prior cultures reviewed, history of ESBL Klebsiella in Ucx 12/31/24, ESBL E.coli 11/29/24 Ucx     Viral URI d/t coronavirus (not COVID), treated for possible bacterial pneumonia  Persistent fevers, worsening hypoxia likely due to PJP   - 1/17 RVP with Coronavirus (229E,HKU1,NL63,OC43) detected   - 1/17 CT Chest with extensive new b/l ground glass nodular opacities, upper lobe dominant - possible pneumonia   - 1/17 CTAP decreased R hepatic mass since 12/9/24; known splenic mass; changes likely c/w retroperitoneal fibrosis   - s/p zosyn 1/17-1/20, escalated to ertapenem 1/20pm d/t fevers but fevers continued with worsening hypoxia   - 1/23 CTAP with no significant changes, diffuse erosive changes in sacrum with soft tissue infiltration similar to prior, low suspicion for OM given no open wound in area  - 1/23 CT Chest with diffuse b/l patchy ggo with central predominance increased from 1/17/25 - edema vs pneumonia; unchanged LLL round atelectasis; small L pleural effusion   -- c/f PCP based on above repeat CT, ongoing fevers, worsening hypoxia now requiring HFNC, LDH increased, and h/o of being on steroids without ppx and iso malignancy, noted vancomycin added overnight, s/p hydrocortisone 1/22-1/23   - IP eval appreciated, pt prefers to avoid bronch d/t risk of difficulty weaning from vent  - 1/17, 1/19, 1/21, 1/24 Bcx remain NGTD  - 1/24 Aspergillus ag negative   1/25 ua negative, EBV serology c/w past infection, CMV PCR negative    1/26 fungitell > 500 - c/w suspicion for PJP  1/29 afebrile >48h now, WBC stable, weaned off HFNC-now on NC, no cough  1/30 WBC trend noted, on NC, afebrile     s/p zosyn 1/17-1/20  s/p ertapenem 1/20- 1/24  s/p vanc x1 on 1/24  s/p meropenem 1/24-1/26   started IV bactrim + prednisone 1/24-    Recommendations:   Continue Bactrim 320mg IV Q8h (based on pts weight) - plan for 21d course until 2/13  -can look at changing to PO bactrim pending further clinical improvement   Continue Prednisone 40mg PO daily x5d (1/29-2/2), then 20mg daily for 11 days (2/3-2/13)  Nephrology following, monitor renal function closely    Continue on valtrex ppx  Monitor temps/CBC  Aspiration precautions  Wound care, offloading as able, nutrition  Continue rest of care per primary team       Risa Ac M.D.  [facility]  Available on [employer] TEAMS - *PREFERRED*  [phone]  After 5pm on weekdays and all day on weekends - please call [phone]     Thank you for consulting us and involving us in the management of this patients case. In addition to reviewing history, imaging, documents, labs, microbiology, took into account antibiotic stewardship, local antibiogram and infection control strategies and potential transmission issues.

## 2025-01-30 NOTE — PROGRESS NOTE ADULT - PROBLEM SELECTOR PLAN 2
1/17 Admitted + for Coronavirus, 1/24 - CT chest with c/f new PCP pneumonia, recs DC IV vanc, switch IV erta to IV mick given hypoalbuminemia efficacy concerns and start IV bactrim for empiric PCP PNA treatment as well as steroid taper pred 40mg BID x5 days followed by pred 40mg QD x5d then 20mg QD.   1/24 fungitell high > 500; pending galactomannan, beta D glucan, Initially on HFNC, weaned to 6L NC     Plan:  -wean O2 as tolerated and monitor on pulse ox, currently on 4L NC  -f/u sputum culture and sputum for PCP PCR if able to expectorate   -f/u aspergillus Ag, in process   -Bactrim 320mg IV Q8h (based on pts weight) - plan for 21d course   -c/w Prednisone 40mg PO BID x5d until 1/28 then 40mg daily x5d (1/29-2/2), then 20mg daily for 11 days (2/3-2/13)  -Continue on valtrex ppx  - Monitor temps/CBC 1/17 Admitted + for Coronavirus, 1/24 - CT chest with c/f new PCP pneumonia, recs DC IV vanc, switch IV erta to IV mick given hypoalbuminemia efficacy concerns and start IV bactrim for empiric PCP PNA treatment as well as steroid taper pred 40mg BID x5 days followed by pred 40mg QD x5d then 20mg QD.   1/24 fungitell high > 500; pending galactomannan, beta D glucan, Initially on HFNC, weaned to 6L NC     Plan:  -wean O2 as tolerated and monitor on pulse ox, currently on 2L NC  -f/u sputum culture and sputum for PCP PCR if able to expectorate   -f/u aspergillus Ag, in process   -Bactrim 320mg IV Q8h (based on pts weight) - plan for 21d course   -c/w Prednisone 40mg PO BID x5d until 1/28 then 40mg daily x5d (1/29-2/2), then 20mg daily for 11 days (2/3-2/13)  -Continue on valtrex ppx  - Monitor temps/CBC

## 2025-01-30 NOTE — PROGRESS NOTE ADULT - SUBJECTIVE AND OBJECTIVE BOX
Seen earlier today     Chief Complaint: Diabetes Mellitus follow up    INTERVAL HX: " Feel okay" Tolerating POs, eats full meals, ate Glucerna, crambled egg and potatoes for breakfast. Noted tightly controlled BG ( 70s ) last night and hypoglycemia ( Serum BG 61 and POC BG 70) this am. Denies having hypoglycemia symptoms. As per pt, he does not usually have hypoglycemia but passes out when it is low. He uses Dexcom G6 at home with reader. He doesn't have a smart phone. Currently on Prednisone 40 mg daily until 2/2.       Review of Systems:  General: As above  GI: No nausea, vomiting  Endocrine: no  S&Sx of hypoglycemia    Allergies    No Known Allergies    Intolerances      MEDICATIONS  (STANDING):  amLODIPine   Tablet 10 milliGRAM(s) Oral daily  artificial tears (preservative free) Ophthalmic Solution 1 Drop(s) Both EYES every 6 hours  buPROPion XL (24-Hour) . 300 milliGRAM(s) Oral daily  carvedilol 12.5 milliGRAM(s) Oral every 12 hours  chlorhexidine 4% Liquid 1 Application(s) Topical <User Schedule>  cloNIDine 0.1 milliGRAM(s) Oral three times a day  dextrose 5%. 1000 milliLiter(s) (50 mL/Hr) IV Continuous <Continuous>  dextrose 5%. 1000 milliLiter(s) (100 mL/Hr) IV Continuous <Continuous>  dextrose 50% Injectable 25 Gram(s) IV Push once  enoxaparin Injectable 40 milliGRAM(s) SubCutaneous <User Schedule>  escitalopram 10 milliGRAM(s) Oral daily  glucagon  Injectable 1 milliGRAM(s) IntraMuscular once  hydrALAZINE 100 milliGRAM(s) Oral every 8 hours  insulin glargine Injectable (LANTUS) 34 Unit(s) SubCutaneous at bedtime  insulin lispro (ADMELOG) corrective regimen sliding scale   SubCutaneous <User Schedule>  insulin lispro (ADMELOG) corrective regimen sliding scale   SubCutaneous three times a day before meals  insulin lispro Injectable (ADMELOG) 20 Unit(s) SubCutaneous before dinner  insulin lispro Injectable (ADMELOG) 5 Unit(s) SubCutaneous with lunch  insulin lispro Injectable (ADMELOG) 35 Unit(s) SubCutaneous before breakfast  lactulose Syrup 15 Gram(s) Oral every 12 hours  levothyroxine 88 MICROGram(s) Oral daily  pantoprazole    Tablet 40 milliGRAM(s) Oral before breakfast  polyethylene glycol 3350 17 Gram(s) Oral every 12 hours  predniSONE   Tablet 40 milliGRAM(s) Oral daily  rosuvastatin 10 milliGRAM(s) Oral at bedtime  senna 2 Tablet(s) Oral at bedtime  sodium zirconium cyclosilicate 10 Gram(s) Oral daily  trimethoprim / sulfamethoxazole IVPB 320 milliGRAM(s) IV Intermittent every 8 hours  valACYclovir 500 milliGRAM(s) Oral every 12 hours        insulin glargine Injectable (LANTUS)   48 Unit(s) SubCutaneous (01-29-25 @ 22:40)    insulin lispro (ADMELOG) corrective regimen sliding scale   2 Unit(s) SubCutaneous (01-29-25 @ 17:51)    insulin lispro Injectable (ADMELOG)   5 Unit(s) SubCutaneous (01-30-25 @ 12:57)    insulin lispro Injectable (ADMELOG)   30 Unit(s) SubCutaneous (01-29-25 @ 17:51)    levothyroxine   88 MICROGram(s) Oral (01-30-25 @ 06:26)    predniSONE   Tablet   40 milliGRAM(s) Oral (01-30-25 @ 06:27)    rosuvastatin   10 milliGRAM(s) Oral (01-29-25 @ 22:39)        PHYSICAL EXAM:  VITALS: T(C): 36.8 (01-30-25 @ 12:09)  T(F): 98.3 (01-30-25 @ 12:09), Max: 98.3 (01-30-25 @ 12:09)  HR: 69 (01-30-25 @ 12:09) (63 - 69)  BP: 126/60 (01-30-25 @ 12:09) (116/60 - 135/56)  RR:  (18 - 18)  SpO2:  (95% - 96%)  Wt(kg): --  GENERAL: Male laying in bed,  in NAD  Respiratory: Respirations unlabored   Extremities: Warm, no edema  NEURO: Alert , appropriate     LABS:  POCT Blood Glucose.: 127 mg/dL (01-30-25 @ 12:26)  POCT Blood Glucose.: 70 mg/dL (01-30-25 @ 08:42)  POCT Blood Glucose.: 162 mg/dL (01-30-25 @ 01:58)  POCT Blood Glucose.: 82 mg/dL (01-29-25 @ 22:17)  POCT Blood Glucose.: 77 mg/dL (01-29-25 @ 22:16)  POCT Blood Glucose.: 76 mg/dL (01-29-25 @ 21:47)  POCT Blood Glucose.: 79 mg/dL (01-29-25 @ 21:24)  POCT Blood Glucose.: 158 mg/dL (01-29-25 @ 17:09)  POCT Blood Glucose.: 207 mg/dL (01-29-25 @ 12:30)  POCT Blood Glucose.: 320 mg/dL (01-29-25 @ 08:41)  POCT Blood Glucose.: 170 mg/dL (01-28-25 @ 21:59)  POCT Blood Glucose.: 226 mg/dL (01-28-25 @ 17:25)  POCT Blood Glucose.: 223 mg/dL (01-28-25 @ 12:31)  POCT Blood Glucose.: 219 mg/dL (01-28-25 @ 09:07)  POCT Blood Glucose.: 319 mg/dL (01-27-25 @ 21:48)  POCT Blood Glucose.: 382 mg/dL (01-27-25 @ 17:25)                          7.6    12.21 )-----------( 428      ( 30 Jan 2025 07:05 )             24.0     01-30    134[L]  |  103  |  30[H]  ----------------------------<  61[L]  5.2   |  23  |  0.95    Ca    10.0      30 Jan 2025 07:06  Phos  2.0     01-30  Mg     1.7     01-30    TPro  5.1[L]  /  Alb  2.4[L]  /  TBili  0.2  /  DBili  x   /  AST  37  /  ALT  25  /  AlkPhos  82  01-30    eGFR: 88 mL/min/1.73m2 (30 Jan 2025 07:06)      Thyroid Function Tests:      A1C with Estimated Average Glucose Result: 8.4 % (01-28-25 @ 07:38)  A1C with Estimated Average Glucose Result: 7.8 % (12-29-24 @ 07:07)  A1C with Estimated Average Glucose Result: 7.7 % (12-28-24 @ 10:06)  A1C with Estimated Average Glucose Result: 7.4 % (11-30-24 @ 06:40)    Estimated Average Glucose: 194 mg/dL (01-28-25 @ 07:38)  Estimated Average Glucose: 177 mg/dL (12-29-24 @ 07:07)  Estimated Average Glucose: 174 mg/dL (12-28-24 @ 10:06)  Estimated Average Glucose: 166 mg/dL (11-30-24 @ 06:40)        Diet, Consistent Carbohydrate/No Snacks:   Supplement Feeding Modality:  Oral  Glucerna Shake Cans or Servings Per Day:  1       Frequency:  Daily (01-26-25 @ 23:00) [Active]

## 2025-01-30 NOTE — PROGRESS NOTE ADULT - SUBJECTIVE AND OBJECTIVE BOX
INTERVAL HPI/OVERNIGHT EVENTS:  Patient S&E at bedside. No o/n events,     VITAL SIGNS:  T(F): 98.2 (01-30-25 @ 04:20)  HR: 65 (01-30-25 @ 04:20)  BP: 135/56 (01-30-25 @ 04:20)  RR: 18 (01-30-25 @ 04:20)  SpO2: 95% (01-30-25 @ 04:20)  Wt(kg): --    PHYSICAL EXAM:    Constitutional: NAD  Eyes: EOMI, sclera non-icteric  Neck: supple  Respiratory: CTAB, no wheezes or crackles   Cardiovascular: RRR  Gastrointestinal: soft, NTND, + BS  Extremities: no cyanosis, clubbing or edema   Neurological: awake and alert      MEDICATIONS  (STANDING):  amLODIPine   Tablet 10 milliGRAM(s) Oral daily  artificial tears (preservative free) Ophthalmic Solution 1 Drop(s) Both EYES every 6 hours  buPROPion XL (24-Hour) . 300 milliGRAM(s) Oral daily  carvedilol 12.5 milliGRAM(s) Oral every 12 hours  chlorhexidine 4% Liquid 1 Application(s) Topical <User Schedule>  cloNIDine 0.1 milliGRAM(s) Oral three times a day  dextrose 5%. 1000 milliLiter(s) (50 mL/Hr) IV Continuous <Continuous>  dextrose 5%. 1000 milliLiter(s) (100 mL/Hr) IV Continuous <Continuous>  dextrose 50% Injectable 25 Gram(s) IV Push once  enoxaparin Injectable 40 milliGRAM(s) SubCutaneous <User Schedule>  escitalopram 10 milliGRAM(s) Oral daily  glucagon  Injectable 1 milliGRAM(s) IntraMuscular once  hydrALAZINE 100 milliGRAM(s) Oral every 8 hours  insulin glargine Injectable (LANTUS) 48 Unit(s) SubCutaneous at bedtime  insulin lispro (ADMELOG) corrective regimen sliding scale   SubCutaneous <User Schedule>  insulin lispro (ADMELOG) corrective regimen sliding scale   SubCutaneous three times a day before meals  insulin lispro Injectable (ADMELOG) 30 Unit(s) SubCutaneous before dinner  insulin lispro Injectable (ADMELOG) 35 Unit(s) SubCutaneous before breakfast  lactulose Syrup 15 Gram(s) Oral every 12 hours  levothyroxine 88 MICROGram(s) Oral daily  pantoprazole    Tablet 40 milliGRAM(s) Oral before breakfast  polyethylene glycol 3350 17 Gram(s) Oral every 12 hours  predniSONE   Tablet 40 milliGRAM(s) Oral daily  rosuvastatin 10 milliGRAM(s) Oral at bedtime  senna 2 Tablet(s) Oral at bedtime  sodium zirconium cyclosilicate 10 Gram(s) Oral daily  trimethoprim / sulfamethoxazole IVPB 320 milliGRAM(s) IV Intermittent every 8 hours  valACYclovir 500 milliGRAM(s) Oral every 12 hours    MEDICATIONS  (PRN):  acetaminophen     Tablet .. 650 milliGRAM(s) Oral every 6 hours PRN Temp greater or equal to 38C (100.4F), Mild Pain (1 - 3)  dextrose Oral Gel 15 Gram(s) Oral once PRN Blood Glucose LESS THAN 70 milliGRAM(s)/deciliter  sodium chloride 0.9% lock flush 10 milliLiter(s) IV Push every 1 hour PRN Pre/post blood products, medications, blood draw, and to maintain line patency      Allergies    No Known Allergies    Intolerances        LABS:                        7.6    12.21 )-----------( 428      ( 30 Jan 2025 07:05 )             24.0     01-30    134[L]  |  103  |  30[H]  ----------------------------<  61[L]  5.2   |  23  |  0.95    Ca    10.0      30 Jan 2025 07:06  Phos  2.0     01-30  Mg     1.7     01-30    TPro  5.1[L]  /  Alb  2.4[L]  /  TBili  0.2  /  DBili  x   /  AST  37  /  ALT  25  /  AlkPhos  82  01-30      Urinalysis Basic - ( 30 Jan 2025 07:06 )    Color: x / Appearance: x / SG: x / pH: x  Gluc: 61 mg/dL / Ketone: x  / Bili: x / Urobili: x   Blood: x / Protein: x / Nitrite: x   Leuk Esterase: x / RBC: x / WBC x   Sq Epi: x / Non Sq Epi: x / Bacteria: x        RADIOLOGY & ADDITIONAL TESTS:  Studies reviewed.

## 2025-01-30 NOTE — PROGRESS NOTE ADULT - ASSESSMENT
69 y.o. M w/ PMHx DM, HTN, HLD, ESRD on HD s/p LKRT 2012 (from brother), hypothyroidism, recent admission to [facility] 11/30/24-12/18/24 for AMS found to have sacral OM and E. Coli bacteremia s/p treatment with meropenem till 12/10, complicated by hypercalcemia  (s/p calcitonin, aredia, and started on prednisone by nephrology), found to have new DLBCL on liver biopsy 12/16/24 transferred to [facility] from rehab to start R-CHOP therapy. Pt. received Mini CHOP on 12/28, now admitted for cycle #2 R mini CHOP.    1. LKRT 2012 from brother  - Baseline Scr ~0.8  - allograft w/ good fxn today     2. IS  - Stop Tacro and all other IS in setting of PCP and taper prednisone to 10mg daily  - Bactrim, valacyclovir      3. Fever, SOB, and suspected PJP  - On Bactrim and steroid taper   - IS as above, and R-CHOP currently held   -Transplant ID and pulmonary following     If you have any questions, please feel free to contact me  Cathie Mead  Nephrology Fellow  [place]/Page [phone]  (After 5pm or on weekends please page the on-call fellow)

## 2025-01-30 NOTE — PROGRESS NOTE ADULT - SUBJECTIVE AND OBJECTIVE BOX
[facility]  WANG Mccoy Y. Patel, S. Shah, G. Casimir  [phone]  ([phone] - weekdays after 5pm and weekends)    Name: JAN CALVIN  Age/Gender: 69y Male  MRN: [id number]    Interval History:  Patient seen and examined this morning.   Resting comfortably, no new complaints.   Notes reviewed  No concerning overnight events  Afebrile   Allergies: No Known Allergies      Objective:  Vitals:   T(F): 98.2 (01-30-25 @ 04:20), Max: 98.5 (01-29-25 @ 11:58)  HR: 65 (01-30-25 @ 04:20) (63 - 75)  BP: 135/56 (01-30-25 @ 04:20) (113/58 - 146/70)  RR: 18 (01-30-25 @ 04:20) (18 - 18)  SpO2: 95% (01-30-25 @ 04:20) (95% - 97%)  Physical Examination:  General: no acute distress, NC  HEENT: normocephalic, atraumatic, anicteric  Respiratory: decreased breath sounds b/l   Cardiovascular: S1 and S2 present, normal rate   Gastrointestinal: soft, nontender, nondistended  Extremities: no edema, no cyanosis  Skin: no visible rash    Laboratory Studies:  CBC:                       7.6    12.21 )-----------( 428      ( 30 Jan 2025 07:05 )             24.0     WBC Trend:  12.21 01-30-25 @ 07:05  10.78 01-29-25 @ 05:02  10.57 01-28-25 @ 07:36  8.17 01-27-25 @ 06:58  11.69 01-26-25 @ 06:53  8.38 01-25-25 @ 06:37  10.35 01-24-25 @ 07:30    CMP: 01-30    134[L]  |  103  |  30[H]  ----------------------------<  61[L]  5.2   |  23  |  0.95    Ca    10.0      30 Jan 2025 07:06  Phos  2.0     01-30  Mg     1.7     01-30    TPro  5.1[L]  /  Alb  2.4[L]  /  TBili  0.2  /  DBili  x   /  AST  37  /  ALT  25  /  AlkPhos  82  01-30    Creatinine: 0.95 mg/dL (01-30-25 @ 07:06)  Creatinine: 1.05 mg/dL (01-29-25 @ 05:02)  Creatinine: 1.12 mg/dL (01-28-25 @ 07:34)  Creatinine: 0.87 mg/dL (01-27-25 @ 07:00)  Creatinine: 1.01 mg/dL (01-26-25 @ 17:42)  Creatinine: 1.14 mg/dL (01-26-25 @ 08:11)  Creatinine: 1.17 mg/dL (01-26-25 @ 06:54)  Creatinine: 1.05 mg/dL (01-25-25 @ 06:37)  Creatinine: 0.93 mg/dL (01-24-25 @ 07:23)    LIVER FUNCTIONS - ( 30 Jan 2025 07:06 )  Alb: 2.4 g/dL / Pro: 5.1 g/dL / ALK PHOS: 82 U/L / ALT: 25 U/L / AST: 37 U/L / GGT: x           Microbiology: reviewed   Culture - Blood (collected 01-24-25 @ 00:46)  Source: .Blood BLOOD  Final Report (01-29-25 @ 04:00):    No growth at 5 days    Culture - Blood (collected 01-24-25 @ 00:18)  Source: .Blood BLOOD  Final Report (01-29-25 @ 04:00):    No growth at 5 days    Culture - Urine (collected 01-22-25 @ 05:05)  Source: Clean Catch Clean Catch (Midstream)  Final Report (01-24-25 @ 13:31):    10,000 - 49,000 CFU/mL Candida albicans    "Susceptibilities not performed"    Culture - Blood (collected 01-21-25 @ 22:25)  Source: .Blood BLOOD  Final Report (01-27-25 @ 03:00):    No growth at 5 days    Culture - Blood (collected 01-21-25 @ 22:07)  Source: .Blood BLOOD  Final Report (01-27-25 @ 03:00):    No growth at 5 days    Culture - Blood (collected 01-19-25 @ 18:36)  Source: .Blood BLOOD  Final Report (01-24-25 @ 23:00):    No growth at 5 days    Culture - Urine (collected 01-19-25 @ 18:36)  Source: Clean Catch Clean Catch (Midstream)  Final Report (01-20-25 @ 21:26):    10,000 - 49,000 CFU/mL Candida albicans    "Susceptibilities not performed"    Culture - Urine (collected 01-17-25 @ 17:09)  Source: Clean Catch Clean Catch (Midstream)  Final Report (01-18-25 @ 20:03):    >=3 organisms. Probable collection contamination.    Culture - Blood (collected 01-17-25 @ 11:38)  Source: .Blood BLOOD  Final Report (01-22-25 @ 15:00):    No growth at 5 days    Radiology: reviewed     Medications:  acetaminophen     Tablet .. 650 milliGRAM(s) Oral every 6 hours PRN  amLODIPine   Tablet 10 milliGRAM(s) Oral daily  artificial tears (preservative free) Ophthalmic Solution 1 Drop(s) Both EYES every 6 hours  buPROPion XL (24-Hour) . 300 milliGRAM(s) Oral daily  carvedilol 12.5 milliGRAM(s) Oral every 12 hours  chlorhexidine 4% Liquid 1 Application(s) Topical <User Schedule>  cloNIDine 0.1 milliGRAM(s) Oral three times a day  dextrose 5%. 1000 milliLiter(s) IV Continuous <Continuous>  dextrose 5%. 1000 milliLiter(s) IV Continuous <Continuous>  dextrose 50% Injectable 25 Gram(s) IV Push once  dextrose Oral Gel 15 Gram(s) Oral once PRN  enoxaparin Injectable 40 milliGRAM(s) SubCutaneous <User Schedule>  escitalopram 10 milliGRAM(s) Oral daily  glucagon  Injectable 1 milliGRAM(s) IntraMuscular once  hydrALAZINE 100 milliGRAM(s) Oral every 8 hours  insulin glargine Injectable (LANTUS) 48 Unit(s) SubCutaneous at bedtime  insulin lispro (ADMELOG) corrective regimen sliding scale   SubCutaneous <User Schedule>  insulin lispro (ADMELOG) corrective regimen sliding scale   SubCutaneous three times a day before meals  insulin lispro Injectable (ADMELOG) 30 Unit(s) SubCutaneous before dinner  insulin lispro Injectable (ADMELOG) 35 Unit(s) SubCutaneous before breakfast  lactulose Syrup 15 Gram(s) Oral every 12 hours  levothyroxine 88 MICROGram(s) Oral daily  pantoprazole    Tablet 40 milliGRAM(s) Oral before breakfast  polyethylene glycol 3350 17 Gram(s) Oral every 12 hours  predniSONE   Tablet 40 milliGRAM(s) Oral daily  rosuvastatin 10 milliGRAM(s) Oral at bedtime  senna 2 Tablet(s) Oral at bedtime  sodium chloride 0.9% lock flush 10 milliLiter(s) IV Push every 1 hour PRN  sodium zirconium cyclosilicate 10 Gram(s) Oral daily  trimethoprim / sulfamethoxazole IVPB 320 milliGRAM(s) IV Intermittent every 8 hours  valACYclovir 500 milliGRAM(s) Oral every 12 hours    Current Antimicrobials:  trimethoprim / sulfamethoxazole IVPB 320 milliGRAM(s) IV Intermittent every 8 hours  valACYclovir 500 milliGRAM(s) Oral every 12 hours    Prior/Completed Antimicrobials:  ertapenem  IVPB  piperacillin/tazobactam IVPB.  piperacillin/tazobactam IVPB.-  vancomycin  IVPB

## 2025-01-30 NOTE — PROGRESS NOTE ADULT - ASSESSMENT
68 y/o M PMHx renal transplant 2012 (2/2 DM, s/p left nephrectomy), peripheral neuropathy, hypothyroidism, retroperitoneal fibrosis, HTN, HLD, GERD, anxiety/depression, ANGELIKA and recent admission at [facility] for sacral osteomyelitis and ESBL.coli urosepsis discharged on 12/18/24 to rehab. While admitted to [place] was noted to have hypercalcemia and liver masses which were biopsied on 12/16/24, biopsy revealed DLBCL. Patient received R mini CHOP on 12/28 now admitted for cycle #2 R-mini CHOP, upon admission patient is febrile and chemotherapy held, course c/b AHRF and c/f PJP pneumonia

## 2025-01-31 LAB
ALBUMIN SERPL ELPH-MCNC: 2.6 G/DL — LOW (ref 3.3–5)
ALP SERPL-CCNC: 92 U/L — SIGNIFICANT CHANGE UP (ref 40–120)
ALT FLD-CCNC: 26 U/L — SIGNIFICANT CHANGE UP (ref 10–45)
ANION GAP SERPL CALC-SCNC: 9 MMOL/L — SIGNIFICANT CHANGE UP (ref 5–17)
AST SERPL-CCNC: 37 U/L — SIGNIFICANT CHANGE UP (ref 10–40)
BASOPHILS # BLD AUTO: 0.05 K/UL — SIGNIFICANT CHANGE UP (ref 0–0.2)
BASOPHILS NFR BLD AUTO: 0.4 % — SIGNIFICANT CHANGE UP (ref 0–2)
BILIRUB SERPL-MCNC: 0.2 MG/DL — SIGNIFICANT CHANGE UP (ref 0.2–1.2)
BUN SERPL-MCNC: 28 MG/DL — HIGH (ref 7–23)
CALCIUM SERPL-MCNC: 10 MG/DL — SIGNIFICANT CHANGE UP (ref 8.4–10.5)
CHLORIDE SERPL-SCNC: 104 MMOL/L — SIGNIFICANT CHANGE UP (ref 96–108)
CO2 SERPL-SCNC: 25 MMOL/L — SIGNIFICANT CHANGE UP (ref 22–31)
CREAT SERPL-MCNC: 0.98 MG/DL — SIGNIFICANT CHANGE UP (ref 0.5–1.3)
EGFR: 85 ML/MIN/1.73M2 — SIGNIFICANT CHANGE UP
EOSINOPHIL # BLD AUTO: 0.08 K/UL — SIGNIFICANT CHANGE UP (ref 0–0.5)
EOSINOPHIL NFR BLD AUTO: 0.6 % — SIGNIFICANT CHANGE UP (ref 0–6)
GLUCOSE BLDC GLUCOMTR-MCNC: 106 MG/DL — HIGH (ref 70–99)
GLUCOSE BLDC GLUCOMTR-MCNC: 116 MG/DL — HIGH (ref 70–99)
GLUCOSE BLDC GLUCOMTR-MCNC: 123 MG/DL — HIGH (ref 70–99)
GLUCOSE BLDC GLUCOMTR-MCNC: 127 MG/DL — HIGH (ref 70–99)
GLUCOSE BLDC GLUCOMTR-MCNC: 128 MG/DL — HIGH (ref 70–99)
GLUCOSE BLDC GLUCOMTR-MCNC: 371 MG/DL — HIGH (ref 70–99)
GLUCOSE BLDC GLUCOMTR-MCNC: 385 MG/DL — HIGH (ref 70–99)
GLUCOSE BLDC GLUCOMTR-MCNC: 40 MG/DL — CRITICAL LOW (ref 70–99)
GLUCOSE BLDC GLUCOMTR-MCNC: 40 MG/DL — CRITICAL LOW (ref 70–99)
GLUCOSE BLDC GLUCOMTR-MCNC: 53 MG/DL — CRITICAL LOW (ref 70–99)
GLUCOSE SERPL-MCNC: 14 MG/DL — CRITICAL LOW (ref 70–99)
HCT VFR BLD CALC: 29.1 % — LOW (ref 39–50)
HGB BLD-MCNC: 9.3 G/DL — LOW (ref 13–17)
IMM GRANULOCYTES NFR BLD AUTO: 8.6 % — HIGH (ref 0–0.9)
LYMPHOCYTES # BLD AUTO: 0.27 K/UL — LOW (ref 1–3.3)
LYMPHOCYTES # BLD AUTO: 2.1 % — LOW (ref 13–44)
MAGNESIUM SERPL-MCNC: 1.9 MG/DL — SIGNIFICANT CHANGE UP (ref 1.6–2.6)
MCHC RBC-ENTMCNC: 29.3 PG — SIGNIFICANT CHANGE UP (ref 27–34)
MCHC RBC-ENTMCNC: 32 G/DL — SIGNIFICANT CHANGE UP (ref 32–36)
MCV RBC AUTO: 91.8 FL — SIGNIFICANT CHANGE UP (ref 80–100)
MONOCYTES # BLD AUTO: 0.42 K/UL — SIGNIFICANT CHANGE UP (ref 0–0.9)
MONOCYTES NFR BLD AUTO: 3.2 % — SIGNIFICANT CHANGE UP (ref 2–14)
NEUTROPHILS # BLD AUTO: 11.11 K/UL — HIGH (ref 1.8–7.4)
NEUTROPHILS NFR BLD AUTO: 85.1 % — HIGH (ref 43–77)
NRBC # BLD: 0 /100 WBCS — SIGNIFICANT CHANGE UP (ref 0–0)
NRBC BLD-RTO: 0 /100 WBCS — SIGNIFICANT CHANGE UP (ref 0–0)
PHOSPHATE SERPL-MCNC: 2.3 MG/DL — LOW (ref 2.5–4.5)
PLATELET # BLD AUTO: 498 K/UL — HIGH (ref 150–400)
POTASSIUM SERPL-MCNC: 5 MMOL/L — SIGNIFICANT CHANGE UP (ref 3.5–5.3)
POTASSIUM SERPL-SCNC: 5 MMOL/L — SIGNIFICANT CHANGE UP (ref 3.5–5.3)
PROT SERPL-MCNC: 5.6 G/DL — LOW (ref 6–8.3)
RBC # BLD: 3.17 M/UL — LOW (ref 4.2–5.8)
RBC # FLD: 19 % — HIGH (ref 10.3–14.5)
SODIUM SERPL-SCNC: 138 MMOL/L — SIGNIFICANT CHANGE UP (ref 135–145)
WBC # BLD: 13.05 K/UL — HIGH (ref 3.8–10.5)
WBC # FLD AUTO: 13.05 K/UL — HIGH (ref 3.8–10.5)

## 2025-01-31 PROCEDURE — 99232 SBSQ HOSP IP/OBS MODERATE 35: CPT

## 2025-01-31 PROCEDURE — 99232 SBSQ HOSP IP/OBS MODERATE 35: CPT | Mod: GC

## 2025-01-31 RX ORDER — DM/PSEUDOEPHED/ACETAMINOPHEN 10-30-250
15 CAPSULE ORAL ONCE
Refills: 0 | Status: COMPLETED | OUTPATIENT
Start: 2025-01-31 | End: 2025-01-31

## 2025-01-31 RX ORDER — DM/PSEUDOEPHED/ACETAMINOPHEN 10-30-250
25 CAPSULE ORAL ONCE
Refills: 0 | Status: COMPLETED | OUTPATIENT
Start: 2025-01-31 | End: 2025-01-31

## 2025-01-31 RX ORDER — SULFAMETHOXAZOLE AND TRIMETHOPRIM 400; 80 MG/1; MG/1
1 TABLET ORAL DAILY
Refills: 0 | Status: DISCONTINUED | OUTPATIENT
Start: 2025-01-31 | End: 2025-01-31

## 2025-01-31 RX ORDER — INSULIN GLARGINE-YFGN 100 [IU]/ML
7 INJECTION, SOLUTION SUBCUTANEOUS AT BEDTIME
Refills: 0 | Status: DISCONTINUED | OUTPATIENT
Start: 2025-01-31 | End: 2025-02-01

## 2025-01-31 RX ORDER — SODIUM PHOSPHATE, DIBASIC, ANHYDROUS, POTASSIUM PHOSPHATE, MONOBASIC, AND SODIUM PHOSPHATE, MONOBASIC, MONOHYDRATE 852; 155; 130 MG/1; MG/1; MG/1
1 TABLET, COATED ORAL ONCE
Refills: 0 | Status: DISCONTINUED | OUTPATIENT
Start: 2025-01-31 | End: 2025-01-31

## 2025-01-31 RX ORDER — SULFAMETHOXAZOLE AND TRIMETHOPRIM 400; 80 MG/1; MG/1
2 TABLET ORAL EVERY 8 HOURS
Refills: 0 | Status: DISCONTINUED | OUTPATIENT
Start: 2025-01-31 | End: 2025-02-02

## 2025-01-31 RX ORDER — SOD PHOSPHATE,MONOBASIC-DIBAS 3MMOL/ML
30 VIAL (ML) INTRAVENOUS ONCE
Refills: 0 | Status: COMPLETED | OUTPATIENT
Start: 2025-01-31 | End: 2025-01-31

## 2025-01-31 RX ADMIN — Medication 100 MILLIGRAM(S): at 06:47

## 2025-01-31 RX ADMIN — Medication 6: at 21:53

## 2025-01-31 RX ADMIN — SULFAMETHOXAZOLE AND TRIMETHOPRIM 2 TABLET(S): 400; 80 TABLET ORAL at 14:31

## 2025-01-31 RX ADMIN — ANTISEPTIC SURGICAL SCRUB 1 APPLICATION(S): 0.04 SOLUTION TOPICAL at 08:44

## 2025-01-31 RX ADMIN — BUPROPION HYDROCHLORIDE 300 MILLIGRAM(S): 150 TABLET, EXTENDED RELEASE ORAL at 13:04

## 2025-01-31 RX ADMIN — Medication 1 DROP(S): at 22:11

## 2025-01-31 RX ADMIN — Medication 25 GRAM(S): at 08:40

## 2025-01-31 RX ADMIN — ENOXAPARIN SODIUM 40 MILLIGRAM(S): 100 INJECTION SUBCUTANEOUS at 21:53

## 2025-01-31 RX ADMIN — Medication 2 TABLET(S): at 21:54

## 2025-01-31 RX ADMIN — Medication 15 GRAM(S): at 17:51

## 2025-01-31 RX ADMIN — PANTOPRAZOLE 40 MILLIGRAM(S): 20 TABLET, DELAYED RELEASE ORAL at 06:46

## 2025-01-31 RX ADMIN — Medication 1 DROP(S): at 06:40

## 2025-01-31 RX ADMIN — LEVOTHYROXINE SODIUM 88 MICROGRAM(S): 25 TABLET ORAL at 06:46

## 2025-01-31 RX ADMIN — Medication 1 DROP(S): at 17:50

## 2025-01-31 RX ADMIN — INSULIN GLARGINE-YFGN 7 UNIT(S): 100 INJECTION, SOLUTION SUBCUTANEOUS at 21:59

## 2025-01-31 RX ADMIN — SULFAMETHOXAZOLE AND TRIMETHOPRIM 280 MILLIGRAM(S): 400; 80 TABLET ORAL at 06:47

## 2025-01-31 RX ADMIN — Medication 15 GRAM(S): at 06:46

## 2025-01-31 RX ADMIN — ROSUVASTATIN CALCIUM 10 MILLIGRAM(S): 10 TABLET, FILM COATED ORAL at 21:54

## 2025-01-31 RX ADMIN — CLONIDINE HYDROCHLORIDE 0.1 MILLIGRAM(S): 0.2 TABLET ORAL at 06:47

## 2025-01-31 RX ADMIN — Medication 85 MILLIMOLE(S): at 13:06

## 2025-01-31 RX ADMIN — VALACYCLOVIR 500 MILLIGRAM(S): 1000 TABLET ORAL at 17:50

## 2025-01-31 RX ADMIN — VALACYCLOVIR 500 MILLIGRAM(S): 1000 TABLET ORAL at 06:47

## 2025-01-31 RX ADMIN — SODIUM ZIRCONIUM CYCLOSILICATE 10 GRAM(S): 5 POWDER, FOR SUSPENSION ORAL at 13:05

## 2025-01-31 RX ADMIN — ESCITALOPRAM 10 MILLIGRAM(S): 10 TABLET, FILM COATED ORAL at 13:04

## 2025-01-31 RX ADMIN — SULFAMETHOXAZOLE AND TRIMETHOPRIM 2 TABLET(S): 400; 80 TABLET ORAL at 22:03

## 2025-01-31 RX ADMIN — Medication 15 GRAM(S): at 08:18

## 2025-01-31 RX ADMIN — POLYETHYLENE GLYCOL 3350 17 GRAM(S): 17 POWDER, FOR SOLUTION ORAL at 06:48

## 2025-01-31 RX ADMIN — Medication 10 MILLIGRAM(S): at 06:47

## 2025-01-31 RX ADMIN — Medication 1 DROP(S): at 14:31

## 2025-01-31 RX ADMIN — Medication 12.5 MILLIGRAM(S): at 06:47

## 2025-01-31 RX ADMIN — Medication 10: at 17:49

## 2025-01-31 RX ADMIN — PREDNISONE 40 MILLIGRAM(S): 5 TABLET ORAL at 06:46

## 2025-01-31 NOTE — PROGRESS NOTE ADULT - SUBJECTIVE AND OBJECTIVE BOX
PULMONARY PROGRESS NOTE    PATIENT INFORMATION:  NAME: JAN CALVIN:  MRN: [id number]    CHIEF COMPLAINT: Patient is a 69y old  Male who presents with a chief complaint of "For chemo" (31 Jan 2025 07:28)      [x] INITIAL CONSULT, H&P, FAMILY HISTORY and PAST MEDICAL AND SURGICAL HISTORY REVIEWED    OVERNIGHT EVENTS, CHANGES TO HPI, SUBJECTIVE:   - Patient seen and examined at bedside  - No overnight events  - Symptoms stable/improving    ========================REVIEW OF SYSTEMS========================  [x] ROS negative except as per HPI    ========================MEDICATIONS=============================  NEURO  buPROPion XL (24-Hour) . 300 milliGRAM(s) Oral daily  escitalopram 10 milliGRAM(s) Oral daily    CARDIO  amLODIPine   Tablet 10 milliGRAM(s) Oral daily  carvedilol 12.5 milliGRAM(s) Oral every 12 hours  cloNIDine 0.1 milliGRAM(s) Oral three times a day  hydrALAZINE 100 milliGRAM(s) Oral every 8 hours    PULM    FEN/GI  dextrose 5%. 1000 milliLiter(s) IV Continuous <Continuous>  dextrose 5%. 1000 milliLiter(s) IV Continuous <Continuous>  lactulose Syrup 15 Gram(s) Oral every 12 hours  pantoprazole    Tablet 40 milliGRAM(s) Oral before breakfast  polyethylene glycol 3350 17 Gram(s) Oral every 12 hours  senna 2 Tablet(s) Oral at bedtime  sodium phosphate 30 milliMole(s)/500 mL IVPB 30 milliMole(s) IV Intermittent once    HEME/ONC  enoxaparin Injectable 40 milliGRAM(s) SubCutaneous <User Schedule>    ANTIMICROBIALS  trimethoprim / sulfamethoxazole IVPB 320 milliGRAM(s) IV Intermittent every 8 hours  valACYclovir 500 milliGRAM(s) Oral every 12 hours    ENDO  dextrose 50% Injectable 25 Gram(s) IV Push once  glucagon  Injectable 1 milliGRAM(s) IntraMuscular once  insulin glargine Injectable (LANTUS) 34 Unit(s) SubCutaneous at bedtime  insulin lispro (ADMELOG) corrective regimen sliding scale   SubCutaneous <User Schedule>  insulin lispro (ADMELOG) corrective regimen sliding scale   SubCutaneous three times a day before meals  insulin lispro Injectable (ADMELOG) 5 Unit(s) SubCutaneous with lunch  insulin lispro Injectable (ADMELOG) 7 Unit(s) SubCutaneous before dinner  insulin lispro Injectable (ADMELOG) 10 Unit(s) SubCutaneous before breakfast  levothyroxine 88 MICROGram(s) Oral daily  predniSONE   Tablet 40 milliGRAM(s) Oral daily  rosuvastatin 10 milliGRAM(s) Oral at bedtime    OTHER  artificial tears (preservative free) Ophthalmic Solution 1 Drop(s) Both EYES every 6 hours  chlorhexidine 4% Liquid 1 Application(s) Topical <User Schedule>  sodium zirconium cyclosilicate 10 Gram(s) Oral daily      PRN      Drips      ========================PHYSICAL EXAM============================    VITALS: Vital Signs Last 24 Hrs  T(C): 36.3 (31 Jan 2025 08:20), Max: 36.8 (30 Jan 2025 12:09)  T(F): 97.4 (31 Jan 2025 08:20), Max: 98.3 (30 Jan 2025 12:09)  HR: 58 (31 Jan 2025 08:20) (58 - 71)  BP: 120/58 (31 Jan 2025 08:20) (120/58 - 168/65)  BP(mean): --  RR: 18 (31 Jan 2025 08:20) (18 - 18)  SpO2: 91% (31 Jan 2025 08:20) (91% - 96%)    Parameters below as of 31 Jan 2025 08:20  Patient On (Oxygen Delivery Method): nasal cannula  O2 Flow (L/min): 2      INTAKE and OUTPUT: I&O's Detail    30 Jan 2025 07:01  -  31 Jan 2025 07:00  --------------------------------------------------------  IN:    IV PiggyBack: 800 mL  Total IN: 800 mL    OUT:    Voided (mL): 4550 mL  Total OUT: 4550 mL    Total NET: -3750 mL          VENTILATOR SETTINGS:     Physical Exam  General: WN/WD NAD  HEENT: PERRL, EOMI, moist mucous membranes  Neurology: A&Ox3, nonfocal, GARDNER x 4  Respiratory: crackles  CV: RRR, S1S2, no murmurs, rubs or gallops  Abdominal: Soft, NT, ND +BS  Extremities: No edema, + peripheral pulses        ======================LABORATORY RESULTS AND IMAGING=============                        9.3    13.05 )-----------( 498      ( 31 Jan 2025 07:10 )             29.1                                  01-31    138  |  104  |  28[H]  ----------------------------<  14[LL]  5.0   |  25  |  0.98    Ca    10.0      31 Jan 2025 07:11  Phos  2.3     01-31  Mg     1.9     01-31    TPro  5.6[L]  /  Alb  2.6[L]  /  TBili  0.2  /  DBili  x   /  AST  37  /  ALT  26  /  AlkPhos  92  01-31    N:11.11/L:0.27= __ 01-31-25 @ 07:10  N:10.76/L:0.27= __ 01-30-25 @ 07:05  N:9.66/L:0.11= __ 01-29-25 @ 05:02  N:10.18/L:0.00= __ 01-28-25 @ 07:36  N:7.66/L:0.11= __ 01-27-25 @ 06:58    Ref-range: <3 normal, >9 elevated, >18 very elevated    Procalcitonin: 0.52 ng/mL (01-20-25 @ 06:57)        ABG Trend  02-03-20 @ 22:15 FiO2--  - 7.43/27/78/20/96 Lactate --  10-15-16 @ 10:51 FmG420.0  - 7.42/33/76/22/95 Lactate --    VBG Trend  01-18-25 @ 02:40 FiO2--  - 7.48/34/186/25/98.8 Lactate 1.3  12-28-24 @ 18:46 FiO2--  - 7.41/45/52/28/82.1 Lactate --  02-04-20 @ 16:02 FiO2--  - 7.44/29/104/22/97 Lactate --      Creatinine Trend: 0.98<--, 0.95<--, 1.05<--, 1.12<--, 0.87<--, 1.01<--    [x] RADIOLOGY REVIEWED AND INTERPRETED BY ME

## 2025-01-31 NOTE — GOALS OF CARE CONVERSATION - ADVANCED CARE PLANNING - CONVERSATION DETAILS
A voluntary discussion regarding GOC was engaged with the patient, with his wife (Ludmila Gann) and their family friend present per his instruction. The patient was AOx4 and understood his current diagnosis / prognosis. He mentioned the most important thing for him was to remain pain free and pass without pain. He clearly stated the following advance directives:    - DNR  - DNI w/ trial of NIV  - No Dialysis  - No parenteral nutrition  - Ok to use IV antibiotics, IV fluids, pressors  - Ok to insert and use the feeding tube if it would not be permanent    A MOLST form was filled during this conversation, and was signed by the patient himself. Regina Song R.N and ANTWON acted as the witnesses.

## 2025-01-31 NOTE — PROGRESS NOTE ADULT - PROBLEM SELECTOR PLAN 2
1/17 Admitted + for Coronavirus, 1/24 - CT chest with c/f new PCP pneumonia, recs DC IV vanc, switch IV erta to IV mick given hypoalbuminemia efficacy concerns and start IV bactrim for empiric PCP PNA treatment as well as steroid taper pred 40mg BID x5 days followed by pred 40mg QD x5d then 20mg QD.   1/24 fungitell high > 500; pending galactomannan, beta D glucan, Initially on HFNC, weaned to 6L NC     Plan:  -wean O2 as tolerated and monitor on pulse ox, currently on 2L NC  -f/u sputum culture and sputum for PCP PCR if able to expectorate   -f/u aspergillus Ag, in process   -Bactrim 320mg IV Q8h (based on pts weight) - plan for 21d course   -c/w Prednisone 40mg PO BID x5d until 1/28 then 40mg daily x5d (1/29-2/2), then 20mg daily for 11 days (2/3-2/13)  -Continue on valtrex ppx  - Monitor temps/CBC 1/17 Admitted + for Coronavirus, 1/24 - CT chest with c/f new PCP pneumonia, recs DC IV vanc, switch IV erta to IV mick given hypoalbuminemia efficacy concerns and start IV bactrim for empiric PCP PNA treatment as well as steroid taper pred 40mg BID x5 days followed by pred 40mg QD x5d then 20mg QD.   1/24 fungitell high > 500; pending galactomannan, beta D glucan, Initially on HFNC, weaned to 6L NC     Plan: * Changed bactrim to PO*  -wean O2 as tolerated and monitor on pulse ox, currently on 2L NC  -f/u sputum culture and sputum for PCP PCR if able to expectorate   -f/u aspergillus Ag, in process   -Bactrim 320mg IV Q8h (based on pts weight) - plan for 21d course   -c/w Prednisone 40mg PO BID x5d until 1/28 then 40mg daily x5d (1/29-2/2), then 20mg daily for 11 days (2/3-2/13)  -Continue on valtrex ppx  - Monitor temps/CBC

## 2025-01-31 NOTE — PROGRESS NOTE ADULT - ASSESSMENT
70 y/o M PMHx renal transplant 2012 (2/2 DM, s/p left nephrectomy), peripheral neuropathy, hypothyroidism, retroperitoneal fibrosis, HTN, HLD, GERD, anxiety/depression, ANGELIKA and recent admission at [facility] for sacral osteomyelitis and ESBL.coli urosepsis discharged on 12/18/24 to rehab. While admitted to [place] was noted to have hypercalcemia and liver masses which were biopsied on 12/16/24, biopsy revealed DLBCL. Patient received R mini CHOP on 12/28 now admitted for cycle #2 R-mini CHOP, upon admission patient is febrile and chemotherapy held, course c/b AHRF and c/f PJP pneumonia

## 2025-01-31 NOTE — PROGRESS NOTE ADULT - PROBLEM SELECTOR PLAN 5
- LDL goal <70  - on rosuvastatin 10 mg daily  - check lipid panel as outpatient on a yearly basis      Contact via Microsoft Teams during business hours  To reach covering provider access AMION via sunrise tools  For Urgent matters/after-hours/weekends/holidays please page endocrine fellow on call   For nonurgent matters please email [email or web address]    Please note that this patient may be followed by different provider tomorrow.  Notify endocrine 24 hours prior to discharge for final recommendations

## 2025-01-31 NOTE — PROGRESS NOTE ADULT - SUBJECTIVE AND OBJECTIVE BOX
[facility]  WANG Mccoy Y. Patel, S. Shah, G. Casimir  [phone]  ([phone] - weekdays after 5pm and weekends)    Name: JAN CALVIN  Age/Gender: 69y Male  MRN: [id number]    Interval History:  Patient seen and examined this morning.   Resting comfortably on NC.   No new complaints noted.   Notes reviewed  No concerning overnight events  Afebrile   Allergies: No Known Allergies      Objective:  Vitals:   T(F): 97.4 (01-31-25 @ 08:20), Max: 98.3 (01-30-25 @ 12:09)  HR: 58 (01-31-25 @ 08:20) (58 - 71)  BP: 120/58 (01-31-25 @ 08:20) (120/58 - 168/65)  RR: 18 (01-31-25 @ 08:20) (18 - 18)  SpO2: 91% (01-31-25 @ 08:20) (91% - 96%)  Physical Examination:  General: no acute distress, NC   HEENT: normocephalic, atraumatic, anicteric  Respiratory: no acc muscle use, breathing comfortably  Cardiovascular: S1 and S2 present  Gastrointestinal: normal appearing, nondistended  Extremities: no edema, no cyanosis  Skin: no visible rash    Laboratory Studies:  CBC:                       9.3    13.05 )-----------( 498      ( 31 Jan 2025 07:10 )             29.1     WBC Trend:  13.05 01-31-25 @ 07:10  12.21 01-30-25 @ 07:05  10.78 01-29-25 @ 05:02  10.57 01-28-25 @ 07:36  8.17 01-27-25 @ 06:58  11.69 01-26-25 @ 06:53  8.38 01-25-25 @ 06:37    CMP: 01-31    138  |  104  |  28[H]  ----------------------------<  14[LL]  5.0   |  25  |  0.98    Ca    10.0      31 Jan 2025 07:11  Phos  2.3     01-31  Mg     1.9     01-31    TPro  5.6[L]  /  Alb  2.6[L]  /  TBili  0.2  /  DBili  x   /  AST  37  /  ALT  26  /  AlkPhos  92  01-31    Creatinine: 0.98 mg/dL (01-31-25 @ 07:11)  Creatinine: 0.95 mg/dL (01-30-25 @ 07:06)  Creatinine: 1.05 mg/dL (01-29-25 @ 05:02)  Creatinine: 1.12 mg/dL (01-28-25 @ 07:34)  Creatinine: 0.87 mg/dL (01-27-25 @ 07:00)  Creatinine: 1.01 mg/dL (01-26-25 @ 17:42)  Creatinine: 1.14 mg/dL (01-26-25 @ 08:11)  Creatinine: 1.17 mg/dL (01-26-25 @ 06:54)  Creatinine: 1.05 mg/dL (01-25-25 @ 06:37)    LIVER FUNCTIONS - ( 31 Jan 2025 07:11 )  Alb: 2.6 g/dL / Pro: 5.6 g/dL / ALK PHOS: 92 U/L / ALT: 26 U/L / AST: 37 U/L / GGT: x           Microbiology: reviewed   Culture - Blood (collected 01-24-25 @ 00:46)  Source: .Blood BLOOD  Final Report (01-29-25 @ 04:00):    No growth at 5 days    Culture - Blood (collected 01-24-25 @ 00:18)  Source: .Blood BLOOD  Final Report (01-29-25 @ 04:00):    No growth at 5 days    Culture - Urine (collected 01-22-25 @ 05:05)  Source: Clean Catch Clean Catch (Midstream)  Final Report (01-24-25 @ 13:31):    10,000 - 49,000 CFU/mL Candida albicans    "Susceptibilities not performed"    Culture - Blood (collected 01-21-25 @ 22:25)  Source: .Blood BLOOD  Final Report (01-27-25 @ 03:00):    No growth at 5 days    Culture - Blood (collected 01-21-25 @ 22:07)  Source: .Blood BLOOD  Final Report (01-27-25 @ 03:00):    No growth at 5 days    Culture - Blood (collected 01-19-25 @ 18:36)  Source: .Blood BLOOD  Final Report (01-24-25 @ 23:00):    No growth at 5 days    Culture - Urine (collected 01-19-25 @ 18:36)  Source: Clean Catch Clean Catch (Midstream)  Final Report (01-20-25 @ 21:26):    10,000 - 49,000 CFU/mL Candida albicans    "Susceptibilities not performed"    Culture - Urine (collected 01-17-25 @ 17:09)  Source: Clean Catch Clean Catch (Midstream)  Final Report (01-18-25 @ 20:03):    >=3 organisms. Probable collection contamination.    Culture - Blood (collected 01-17-25 @ 11:38)  Source: .Blood BLOOD  Final Report (01-22-25 @ 15:00):    No growth at 5 days    Radiology: reviewed     Medications:  acetaminophen     Tablet .. 650 milliGRAM(s) Oral every 6 hours PRN  amLODIPine   Tablet 10 milliGRAM(s) Oral daily  artificial tears (preservative free) Ophthalmic Solution 1 Drop(s) Both EYES every 6 hours  buPROPion XL (24-Hour) . 300 milliGRAM(s) Oral daily  carvedilol 12.5 milliGRAM(s) Oral every 12 hours  chlorhexidine 4% Liquid 1 Application(s) Topical <User Schedule>  cloNIDine 0.1 milliGRAM(s) Oral three times a day  dextrose 5%. 1000 milliLiter(s) IV Continuous <Continuous>  dextrose 5%. 1000 milliLiter(s) IV Continuous <Continuous>  dextrose 50% Injectable 25 Gram(s) IV Push once  dextrose Oral Gel 15 Gram(s) Oral once PRN  enoxaparin Injectable 40 milliGRAM(s) SubCutaneous <User Schedule>  escitalopram 10 milliGRAM(s) Oral daily  glucagon  Injectable 1 milliGRAM(s) IntraMuscular once  hydrALAZINE 100 milliGRAM(s) Oral every 8 hours  insulin lispro (ADMELOG) corrective regimen sliding scale   SubCutaneous <User Schedule>  insulin lispro (ADMELOG) corrective regimen sliding scale   SubCutaneous three times a day before meals  lactulose Syrup 15 Gram(s) Oral every 12 hours  levothyroxine 88 MICROGram(s) Oral daily  pantoprazole    Tablet 40 milliGRAM(s) Oral before breakfast  polyethylene glycol 3350 17 Gram(s) Oral every 12 hours  predniSONE   Tablet 40 milliGRAM(s) Oral daily  rosuvastatin 10 milliGRAM(s) Oral at bedtime  senna 2 Tablet(s) Oral at bedtime  sodium chloride 0.9% lock flush 10 milliLiter(s) IV Push every 1 hour PRN  sodium phosphate 30 milliMole(s)/500 mL IVPB 30 milliMole(s) IV Intermittent once  sodium zirconium cyclosilicate 10 Gram(s) Oral daily  trimethoprim / sulfamethoxazole IVPB 320 milliGRAM(s) IV Intermittent every 8 hours  valACYclovir 500 milliGRAM(s) Oral every 12 hours    Current Antimicrobials:  trimethoprim / sulfamethoxazole IVPB 320 milliGRAM(s) IV Intermittent every 8 hours  valACYclovir 500 milliGRAM(s) Oral every 12 hours    Prior/Completed Antimicrobials:  ertapenem  IVPB  piperacillin/tazobactam IVPB.  piperacillin/tazobactam IVPB.-  vancomycin  IVPB

## 2025-01-31 NOTE — PROGRESS NOTE ADULT - ASSESSMENT
70 y/o M PMHx renal transplant 2012 (2/2 DM, s/p left nephrectomy), peripheral neuropathy, hypothyroidism, retroperitoneal fibrosis, HTN, HLD, GERD, anxiety/depression, ANGELIKA and recent admission at [facility] for sacral osteomyelitis and ESBL.coli urosepsis discharged on 12/18/24 to rehab. While admitted to [facility] was noted to have hypercalcemia and liver masses which were biopsied on 12/16/24, biopsy revealed DLBCL. Patient received R mini CHOP on 12/28 now admitted for cycle #2 R mini CHOP, upon admission patient is febrile with increasing lung opacities for which pulmonary is consulted.     ddx here would include PJP given on steroids and immunosuppressed but was not on Bactrim  viral pneumonitis would also be on ddx  coronavirus+  Now significantly improved on 2L NC    - f/u PJP PCR sputum and sputum culture  - viral serologies and PCR for CMV (neg), f/u EBV, HSV  - repeat full RVP (coronavirus pos)  - broad spectrum abx per ID, empiric PJP coverage reasonable; c/w steroids and bactrim  - f/u Fungitell, galactomannan  - urine legionella neg  - check TTE (normal 11/24, and , lower suspicion for hypervolemia)   - recommend follow-up CT chest in 4-6 weeks to evaluate for improvement/ resolution of opacities         Please notify pulmonary prior to discharge and email [email or web address] to schedule an appointment; please provide appointment info to patient    Follow up at  [address] [place] [address], [address]  [place] [address]  tel: [phone]  fax: [phone]   68 y/o M PMHx renal transplant 2012 (2/2 DM, s/p left nephrectomy), peripheral neuropathy, hypothyroidism, retroperitoneal fibrosis, HTN, HLD, GERD, anxiety/depression, ANGELIKA and recent admission at [facility] for sacral osteomyelitis and ESBL.coli urosepsis discharged on 12/18/24 to rehab. While admitted to [facility] was noted to have hypercalcemia and liver masses which were biopsied on 12/16/24, biopsy revealed DLBCL. Patient received R mini CHOP on 12/28 now admitted for cycle #2 R mini CHOP, upon admission patient is febrile with increasing lung opacities for which pulmonary is consulted.     ddx here would include PJP given on steroids and immunosuppressed but was not on Bactrim  viral pneumonitis would also be on ddx  coronavirus+  Now significantly improved on 2L NC    - f/u PJP PCR sputum and sputum culture  - viral serologies and PCR for CMV (neg), f/u EBV, HSV  - repeat full RVP (coronavirus pos)  - broad spectrum abx per ID, empiric PJP coverage reasonable; c/w steroids and bactrim  - f/u Fungitell, galactomannan  - urine legionella neg  - check TTE (normal 11/24, and , lower suspicion for hypervolemia)   - recommend follow-up CT chest 4-6 weeks after treatment to evaluate for improvement/ resolution of opacities     D/w Dr. Chan  Pulmonary to sign off         Please notify pulmonary prior to discharge and email [email or web address] to schedule an appointment; please provide appointment info to patient    Follow up at  [address] [place] [address], [address]  [place] [address]  tel: [phone]  fax: [phone]

## 2025-01-31 NOTE — PROGRESS NOTE ADULT - PROBLEM SELECTOR PLAN 1
INPATIENT PLAN:  - Check BG TID AC, HS, and 2AM  - Severe hypoglycemia, Hold Lantus for now   - Hold standing Admelog with meals for now (HOLD if NPO or not eating meal)   - C/w moderate dose Admelog correctional scales TID AC, HS, and 2AM  - Please keep hypoglycemia protocol in place   - Will restart standing insulin if BGs > 180 X2    DISCHARGE PLANNING:  - Discharge recs pending clinical course and depending on steroids. Will need basal/bolus (doses TBD).   - Endocrine follow up: can f/u wit his Endocrinologist Dr. Romero.   - Recommend routine outpatient ophthalmology and podiatry follow up.

## 2025-01-31 NOTE — PROGRESS NOTE ADULT - SUBJECTIVE AND OBJECTIVE BOX
***********************************************************************  Denny Shields M.D  Resident Physician  Department of Medicine  Available on [employer] Teams  ***********************************************************************  Patient is a 69y old  Male who presents with a chief complaint of "For chemo" (30 Jan 2025 16:07)      OVERNIGHT EVENTS:     SUBJECTIVE: Patient seen and examined at bedside.     ADDITIONAL REVIEW OF SYSTEMS:    MEDICATIONS  (STANDING):  amLODIPine   Tablet 10 milliGRAM(s) Oral daily  artificial tears (preservative free) Ophthalmic Solution 1 Drop(s) Both EYES every 6 hours  buPROPion XL (24-Hour) . 300 milliGRAM(s) Oral daily  carvedilol 12.5 milliGRAM(s) Oral every 12 hours  chlorhexidine 4% Liquid 1 Application(s) Topical <User Schedule>  cloNIDine 0.1 milliGRAM(s) Oral three times a day  dextrose 5%. 1000 milliLiter(s) (50 mL/Hr) IV Continuous <Continuous>  dextrose 5%. 1000 milliLiter(s) (100 mL/Hr) IV Continuous <Continuous>  dextrose 50% Injectable 25 Gram(s) IV Push once  enoxaparin Injectable 40 milliGRAM(s) SubCutaneous <User Schedule>  escitalopram 10 milliGRAM(s) Oral daily  glucagon  Injectable 1 milliGRAM(s) IntraMuscular once  hydrALAZINE 100 milliGRAM(s) Oral every 8 hours  insulin glargine Injectable (LANTUS) 34 Unit(s) SubCutaneous at bedtime  insulin lispro (ADMELOG) corrective regimen sliding scale   SubCutaneous <User Schedule>  insulin lispro (ADMELOG) corrective regimen sliding scale   SubCutaneous three times a day before meals  insulin lispro Injectable (ADMELOG) 5 Unit(s) SubCutaneous with lunch  insulin lispro Injectable (ADMELOG) 7 Unit(s) SubCutaneous before dinner  insulin lispro Injectable (ADMELOG) 10 Unit(s) SubCutaneous before breakfast  lactulose Syrup 15 Gram(s) Oral every 12 hours  levothyroxine 88 MICROGram(s) Oral daily  pantoprazole    Tablet 40 milliGRAM(s) Oral before breakfast  polyethylene glycol 3350 17 Gram(s) Oral every 12 hours  predniSONE   Tablet 40 milliGRAM(s) Oral daily  rosuvastatin 10 milliGRAM(s) Oral at bedtime  senna 2 Tablet(s) Oral at bedtime  sodium zirconium cyclosilicate 10 Gram(s) Oral daily  trimethoprim / sulfamethoxazole IVPB 320 milliGRAM(s) IV Intermittent every 8 hours  valACYclovir 500 milliGRAM(s) Oral every 12 hours    MEDICATIONS  (PRN):  acetaminophen     Tablet .. 650 milliGRAM(s) Oral every 6 hours PRN Temp greater or equal to 38C (100.4F), Mild Pain (1 - 3)  dextrose Oral Gel 15 Gram(s) Oral once PRN Blood Glucose LESS THAN 70 milliGRAM(s)/deciliter  sodium chloride 0.9% lock flush 10 milliLiter(s) IV Push every 1 hour PRN Pre/post blood products, medications, blood draw, and to maintain line patency      CAPILLARY BLOOD GLUCOSE      POCT Blood Glucose.: 127 mg/dL (31 Jan 2025 01:45)  POCT Blood Glucose.: 148 mg/dL (30 Jan 2025 22:28)  POCT Blood Glucose.: 205 mg/dL (30 Jan 2025 17:12)  POCT Blood Glucose.: 127 mg/dL (30 Jan 2025 12:26)  POCT Blood Glucose.: 70 mg/dL (30 Jan 2025 08:42)    I&O's Summary    30 Jan 2025 07:01  -  31 Jan 2025 07:00  --------------------------------------------------------  IN: 0 mL / OUT: 4550 mL / NET: -4550 mL        PHYSICAL EXAM:    Vital Signs Last 24 Hrs  T(C): 36.7 (30 Jan 2025 20:40), Max: 36.8 (30 Jan 2025 12:09)  T(F): 98 (30 Jan 2025 20:40), Max: 98.3 (30 Jan 2025 12:09)  HR: 71 (31 Jan 2025 06:45) (69 - 71)  BP: 168/65 (31 Jan 2025 06:45) (126/60 - 168/65)  BP(mean): --  RR: 18 (31 Jan 2025 06:45) (18 - 18)  SpO2: 96% (31 Jan 2025 06:45) (94% - 96%)    Parameters below as of 31 Jan 2025 06:45  Patient On (Oxygen Delivery Method): nasal cannula    O2 Concentration (%): 2    CONSTITUTIONAL: NAD, well-developed, well-groomed  EYES: Conjunctiva and sclera clear  ENMT: Moist oral mucosa, no pharyngeal injection or exudates; normal dentition  NECK: Supple, no palpable masses; no thyromegaly  RESPIRATORY: Normal respiratory effort; lungs are clear to auscultation bilaterally  CARDIOVASCULAR: Regular rate and rhythm, normal S1 and S2, no murmur/rub/gallop  ABDOMEN: Soft, nontender to palpation, normoactive bowel sounds, no rebound/guarding  MUSCULOSKELETAL: No clubbing or cyanosis of digits; no joint swelling or tenderness to palpation  EXTREMITIES:  No lower extremity edema; Peripheral pulses are 2+ bilaterally  PSYCH: A+O to person, place, and time; affect appropriate  NEUROLOGY: no gross sensory deficits   SKIN: No rashes; no palpable lesions    LABS:                        9.3    13.05 )-----------( 498      ( 31 Jan 2025 07:10 )             29.1     01-30    134[L]  |  103  |  30[H]  ----------------------------<  61[L]  5.2   |  23  |  0.95    Ca    10.0      30 Jan 2025 07:06  Phos  2.0     01-30  Mg     1.7     01-30    TPro  5.1[L]  /  Alb  2.4[L]  /  TBili  0.2  /  DBili  x   /  AST  37  /  ALT  25  /  AlkPhos  82  01-30          Urinalysis Basic - ( 30 Jan 2025 07:06 )    Color: x / Appearance: x / SG: x / pH: x  Gluc: 61 mg/dL / Ketone: x  / Bili: x / Urobili: x   Blood: x / Protein: x / Nitrite: x   Leuk Esterase: x / RBC: x / WBC x   Sq Epi: x / Non Sq Epi: x / Bacteria: x          RADIOLOGY & ADDITIONAL TESTS:   Results Reviewed: Yes     ***********************************************************************  Denny Shields M.D  Resident Physician  Department of Medicine  Available on [employer]  ***********************************************************************  Patient is a 69y old  Male who presents with a chief complaint of "For chemo" (30 Jan 2025 16:07)      OVERNIGHT EVENTS: None    SUBJECTIVE: Patient seen and examined at bedside. No BM still. Hypoglycemic this am with increased chills and fatigue.     ADDITIONAL REVIEW OF SYSTEMS:    MEDICATIONS  (STANDING):  amLODIPine   Tablet 10 milliGRAM(s) Oral daily  artificial tears (preservative free) Ophthalmic Solution 1 Drop(s) Both EYES every 6 hours  buPROPion XL (24-Hour) . 300 milliGRAM(s) Oral daily  carvedilol 12.5 milliGRAM(s) Oral every 12 hours  chlorhexidine 4% Liquid 1 Application(s) Topical <User Schedule>  cloNIDine 0.1 milliGRAM(s) Oral three times a day  dextrose 5%. 1000 milliLiter(s) (50 mL/Hr) IV Continuous <Continuous>  dextrose 5%. 1000 milliLiter(s) (100 mL/Hr) IV Continuous <Continuous>  dextrose 50% Injectable 25 Gram(s) IV Push once  enoxaparin Injectable 40 milliGRAM(s) SubCutaneous <User Schedule>  escitalopram 10 milliGRAM(s) Oral daily  glucagon  Injectable 1 milliGRAM(s) IntraMuscular once  hydrALAZINE 100 milliGRAM(s) Oral every 8 hours  insulin glargine Injectable (LANTUS) 34 Unit(s) SubCutaneous at bedtime  insulin lispro (ADMELOG) corrective regimen sliding scale   SubCutaneous <User Schedule>  insulin lispro (ADMELOG) corrective regimen sliding scale   SubCutaneous three times a day before meals  insulin lispro Injectable (ADMELOG) 5 Unit(s) SubCutaneous with lunch  insulin lispro Injectable (ADMELOG) 7 Unit(s) SubCutaneous before dinner  insulin lispro Injectable (ADMELOG) 10 Unit(s) SubCutaneous before breakfast  lactulose Syrup 15 Gram(s) Oral every 12 hours  levothyroxine 88 MICROGram(s) Oral daily  pantoprazole    Tablet 40 milliGRAM(s) Oral before breakfast  polyethylene glycol 3350 17 Gram(s) Oral every 12 hours  predniSONE   Tablet 40 milliGRAM(s) Oral daily  rosuvastatin 10 milliGRAM(s) Oral at bedtime  senna 2 Tablet(s) Oral at bedtime  sodium zirconium cyclosilicate 10 Gram(s) Oral daily  trimethoprim / sulfamethoxazole IVPB 320 milliGRAM(s) IV Intermittent every 8 hours  valACYclovir 500 milliGRAM(s) Oral every 12 hours    MEDICATIONS  (PRN):  acetaminophen     Tablet .. 650 milliGRAM(s) Oral every 6 hours PRN Temp greater or equal to 38C (100.4F), Mild Pain (1 - 3)  dextrose Oral Gel 15 Gram(s) Oral once PRN Blood Glucose LESS THAN 70 milliGRAM(s)/deciliter  sodium chloride 0.9% lock flush 10 milliLiter(s) IV Push every 1 hour PRN Pre/post blood products, medications, blood draw, and to maintain line patency      CAPILLARY BLOOD GLUCOSE      POCT Blood Glucose.: 127 mg/dL (31 Jan 2025 01:45)  POCT Blood Glucose.: 148 mg/dL (30 Jan 2025 22:28)  POCT Blood Glucose.: 205 mg/dL (30 Jan 2025 17:12)  POCT Blood Glucose.: 127 mg/dL (30 Jan 2025 12:26)  POCT Blood Glucose.: 70 mg/dL (30 Jan 2025 08:42)    I&O's Summary    30 Jan 2025 07:01  -  31 Jan 2025 07:00  --------------------------------------------------------  IN: 0 mL / OUT: 4550 mL / NET: -4550 mL        PHYSICAL EXAM:    Vital Signs Last 24 Hrs  T(C): 36.7 (30 Jan 2025 20:40), Max: 36.8 (30 Jan 2025 12:09)  T(F): 98 (30 Jan 2025 20:40), Max: 98.3 (30 Jan 2025 12:09)  HR: 71 (31 Jan 2025 06:45) (69 - 71)  BP: 168/65 (31 Jan 2025 06:45) (126/60 - 168/65)  BP(mean): --  RR: 18 (31 Jan 2025 06:45) (18 - 18)  SpO2: 96% (31 Jan 2025 06:45) (94% - 96%)    Parameters below as of 31 Jan 2025 06:45  Patient On (Oxygen Delivery Method): nasal cannula    O2 Concentration (%): 2    CONSTITUTIONAL: NAD, fatigued  RESPIRATORY: Normal respiratory effort on O2  CARDIOVASCULAR: Regular rate and rhythm, normal S1 and S2, no murmur/rub/gallop  ABDOMEN: Soft, nontender to palpation  EXTREMITIES:  No lower extremity edema; Peripheral pulses are 2+ bilaterally  PSYCH: A+O to person, place, and time; affect appropriate  NEUROLOGY: no gross sensory deficits   SKIN: No rashes; no palpable lesions    LABS:                        9.3    13.05 )-----------( 498      ( 31 Jan 2025 07:10 )             29.1     01-30    134[L]  |  103  |  30[H]  ----------------------------<  61[L]  5.2   |  23  |  0.95    Ca    10.0      30 Jan 2025 07:06  Phos  2.0     01-30  Mg     1.7     01-30    TPro  5.1[L]  /  Alb  2.4[L]  /  TBili  0.2  /  DBili  x   /  AST  37  /  ALT  25  /  AlkPhos  82  01-30          Urinalysis Basic - ( 30 Jan 2025 07:06 )    Color: x / Appearance: x / SG: x / pH: x  Gluc: 61 mg/dL / Ketone: x  / Bili: x / Urobili: x   Blood: x / Protein: x / Nitrite: x   Leuk Esterase: x / RBC: x / WBC x   Sq Epi: x / Non Sq Epi: x / Bacteria: x          RADIOLOGY & ADDITIONAL TESTS:   Results Reviewed: Yes

## 2025-01-31 NOTE — PROGRESS NOTE ADULT - SUBJECTIVE AND OBJECTIVE BOX
Seen earlier today     Chief Complaint: Diabetes Mellitus follow up    INTERVAL HX: Severe hypoglycemia ( serum BG 14 and POC BG 40/53) noted this am. Patient was seen at bedside, appeared to be somnolent, but verbally responding, able to say his name and " [facility]" . BG was 53 after treated with one glucose gel and 4 oz fruit juice. BG improved to 128 after 50% glucose injection 1/2 ampule and another 4 oz of fruit juice. and pre lunch . As per staff, pt was able to feed himself for breakfast and ate full breakfast. patient received Lantus 34 units last night and currently on Prednisone 40 mg daily       Review of Systems:  General: As above  GI: No nausea, vomiting  Endocrine: no  S&Sx of hypoglycemia    Allergies    No Known Allergies    Intolerances      MEDICATIONS  (STANDING):  amLODIPine   Tablet 10 milliGRAM(s) Oral daily  artificial tears (preservative free) Ophthalmic Solution 1 Drop(s) Both EYES every 6 hours  buPROPion XL (24-Hour) . 300 milliGRAM(s) Oral daily  carvedilol 12.5 milliGRAM(s) Oral every 12 hours  chlorhexidine 4% Liquid 1 Application(s) Topical <User Schedule>  cloNIDine 0.1 milliGRAM(s) Oral three times a day  dextrose 5%. 1000 milliLiter(s) (50 mL/Hr) IV Continuous <Continuous>  dextrose 5%. 1000 milliLiter(s) (100 mL/Hr) IV Continuous <Continuous>  dextrose 50% Injectable 25 Gram(s) IV Push once  enoxaparin Injectable 40 milliGRAM(s) SubCutaneous <User Schedule>  escitalopram 10 milliGRAM(s) Oral daily  glucagon  Injectable 1 milliGRAM(s) IntraMuscular once  hydrALAZINE 100 milliGRAM(s) Oral every 8 hours  insulin lispro (ADMELOG) corrective regimen sliding scale   SubCutaneous three times a day before meals  insulin lispro (ADMELOG) corrective regimen sliding scale   SubCutaneous <User Schedule>  lactulose Syrup 15 Gram(s) Oral every 12 hours  levothyroxine 88 MICROGram(s) Oral daily  pantoprazole    Tablet 40 milliGRAM(s) Oral before breakfast  polyethylene glycol 3350 17 Gram(s) Oral every 12 hours  predniSONE   Tablet 40 milliGRAM(s) Oral daily  rosuvastatin 10 milliGRAM(s) Oral at bedtime  senna 2 Tablet(s) Oral at bedtime  sodium zirconium cyclosilicate 10 Gram(s) Oral daily  trimethoprim  160 mG/sulfamethoxazole 800 mG 2 Tablet(s) Oral every 8 hours  valACYclovir 500 milliGRAM(s) Oral every 12 hours        dextrose 50% Injectable   25 Gram(s) IV Push (01-31-25 @ 08:40)    dextrose Oral Gel   15 Gram(s) Oral (01-31-25 @ 08:18)    insulin glargine Injectable (LANTUS)   34 Unit(s) SubCutaneous (01-30-25 @ 22:38)    insulin lispro (ADMELOG) corrective regimen sliding scale   4 Unit(s) SubCutaneous (01-30-25 @ 17:43)    insulin lispro Injectable (ADMELOG)   7 Unit(s) SubCutaneous (01-30-25 @ 17:43)    levothyroxine   88 MICROGram(s) Oral (01-31-25 @ 06:46)    predniSONE   Tablet   40 milliGRAM(s) Oral (01-31-25 @ 06:46)    rosuvastatin   10 milliGRAM(s) Oral (01-30-25 @ 22:12)        PHYSICAL EXAM:  VITALS: T(C): 36.8 (01-31-25 @ 13:45)  T(F): 98.2 (01-31-25 @ 13:45), Max: 98.2 (01-31-25 @ 13:45)  HR: 79 (01-31-25 @ 13:45) (58 - 79)  BP: 108/45 (01-31-25 @ 13:45) (108/45 - 168/65)  RR:  (18 - 18)  SpO2:  (91% - 96%)  Wt(kg): --  GENERAL:  Male laying in bed  Respiratory: Respirations unlabored   Extremities: Warm, no edema  NEURO: Somnolent, follows commands and answers questions    LABS:  POCT Blood Glucose.: 106 mg/dL (01-31-25 @ 12:23)  POCT Blood Glucose.: 116 mg/dL (01-31-25 @ 10:36)  POCT Blood Glucose.: 123 mg/dL (01-31-25 @ 09:36)  POCT Blood Glucose.: 128 mg/dL (01-31-25 @ 08:58)  POCT Blood Glucose.: 53 mg/dL (01-31-25 @ 08:34)  POCT Blood Glucose.: 40 mg/dL (01-31-25 @ 08:09)  POCT Blood Glucose.: 40 mg/dL (01-31-25 @ 08:08)  POCT Blood Glucose.: 127 mg/dL (01-31-25 @ 01:45)  POCT Blood Glucose.: 148 mg/dL (01-30-25 @ 22:28)  POCT Blood Glucose.: 205 mg/dL (01-30-25 @ 17:12)  POCT Blood Glucose.: 127 mg/dL (01-30-25 @ 12:26)  POCT Blood Glucose.: 70 mg/dL (01-30-25 @ 08:42)  POCT Blood Glucose.: 162 mg/dL (01-30-25 @ 01:58)  POCT Blood Glucose.: 82 mg/dL (01-29-25 @ 22:17)  POCT Blood Glucose.: 77 mg/dL (01-29-25 @ 22:16)  POCT Blood Glucose.: 76 mg/dL (01-29-25 @ 21:47)  POCT Blood Glucose.: 79 mg/dL (01-29-25 @ 21:24)  POCT Blood Glucose.: 158 mg/dL (01-29-25 @ 17:09)  POCT Blood Glucose.: 207 mg/dL (01-29-25 @ 12:30)  POCT Blood Glucose.: 320 mg/dL (01-29-25 @ 08:41)  POCT Blood Glucose.: 170 mg/dL (01-28-25 @ 21:59)  POCT Blood Glucose.: 226 mg/dL (01-28-25 @ 17:25)                          9.3    13.05 )-----------( 498      ( 31 Jan 2025 07:10 )             29.1     01-31    138  |  104  |  28[H]  ----------------------------<  14[LL]  5.0   |  25  |  0.98    Ca    10.0      31 Jan 2025 07:11  Phos  2.3     01-31  Mg     1.9     01-31    TPro  5.6[L]  /  Alb  2.6[L]  /  TBili  0.2  /  DBili  x   /  AST  37  /  ALT  26  /  AlkPhos  92  01-31    eGFR: 85 mL/min/1.73m2 (31 Jan 2025 07:11)      Thyroid Function Tests:      A1C with Estimated Average Glucose Result: 8.4 % (01-28-25 @ 07:38)  A1C with Estimated Average Glucose Result: 7.8 % (12-29-24 @ 07:07)  A1C with Estimated Average Glucose Result: 7.7 % (12-28-24 @ 10:06)  A1C with Estimated Average Glucose Result: 7.4 % (11-30-24 @ 06:40)    Estimated Average Glucose: 194 mg/dL (01-28-25 @ 07:38)  Estimated Average Glucose: 177 mg/dL (12-29-24 @ 07:07)  Estimated Average Glucose: 174 mg/dL (12-28-24 @ 10:06)  Estimated Average Glucose: 166 mg/dL (11-30-24 @ 06:40)        Diet, Consistent Carbohydrate/No Snacks:   Supplement Feeding Modality:  Oral  Glucerna Shake Cans or Servings Per Day:  1       Frequency:  Daily (01-26-25 @ 23:00) [Active]

## 2025-01-31 NOTE — PROVIDER CONTACT NOTE (HYPOGLYCEMIA EVENT) - NS PROVIDER CONTACT BACKGROUND-HYPO
Age: 69y    Gender: Male    POCT Blood Glucose:  92 mg/dL (01-24-25 @ 09:24)  26 mg/dL (01-24-25 @ 08:37)  27 mg/dL (01-24-25 @ 08:34)  142 mg/dL (01-23-25 @ 21:52)  134 mg/dL (01-23-25 @ 18:53)  139 mg/dL (01-23-25 @ 13:50)      eMAR:  dextrose 50% Injectable   25 Gram(s) IV Push (01-24-25 @ 08:42)    insulin glargine Injectable (LANTUS)   38 Unit(s) SubCutaneous (01-23-25 @ 21:54)    insulin lispro Injectable (ADMELOG)   8 Unit(s) SubCutaneous (01-23-25 @ 13:56)    insulin lispro Injectable (ADMELOG)   8 Unit(s) SubCutaneous (01-23-25 @ 18:56)    levothyroxine   88 MICROGram(s) Oral (01-24-25 @ 05:55)    rosuvastatin   10 milliGRAM(s) Oral (01-23-25 @ 21:46)    
Age: 69y    Gender: Male    POCT Blood Glucose:  128 mg/dL (01-31-25 @ 08:58)  53 mg/dL (01-31-25 @ 08:34)  40 mg/dL (01-31-25 @ 08:09)  40 mg/dL (01-31-25 @ 08:08)  127 mg/dL (01-31-25 @ 01:45)  148 mg/dL (01-30-25 @ 22:28)  205 mg/dL (01-30-25 @ 17:12)  127 mg/dL (01-30-25 @ 12:26)      eMAR:  dextrose 50% Injectable   25 Gram(s) IV Push (01-31-25 @ 08:40) Half dose given per Nica Johnson NP at bedside     dextrose Oral Gel   15 Gram(s) Oral (01-31-25 @ 08:18)    insulin glargine Injectable (LANTUS)   34 Unit(s) SubCutaneous (01-30-25 @ 22:38)    insulin lispro (ADMELOG) corrective regimen sliding scale   4 Unit(s) SubCutaneous (01-30-25 @ 17:43)    insulin lispro Injectable (ADMELOG)   5 Unit(s) SubCutaneous (01-30-25 @ 12:57)    insulin lispro Injectable (ADMELOG)   7 Unit(s) SubCutaneous (01-30-25 @ 17:43)    levothyroxine   88 MICROGram(s) Oral (01-31-25 @ 06:46)    predniSONE   Tablet   40 milliGRAM(s) Oral (01-31-25 @ 06:46)    rosuvastatin   10 milliGRAM(s) Oral (01-30-25 @ 22:12)

## 2025-01-31 NOTE — PROGRESS NOTE ADULT - ASSESSMENT
Patient is a 69 year old male with PMH of renal transplant 2012 (2/2 DM, s/p left nephrectomy), peripheral neuropathy, hypothyroidism, retroperitoneal fibrosis, HTN, HLD, GERD, anxiety/depression, ANGELIKA and recent admission at [facility] for ESBL E.coli urosepsis, imaging with ?sacral OM though pt with no sacral wound suspected findings likely related to mets, he was discharged on 12/18/24 to rehab, recently diagnosed DLBCL while admitted to [place] when he was noted to have hypercalcemia and liver masses s/p biopsy 12/16/24, now s/p R mini CHOP on 12/28 who was admitted 1/17 for cycle #2 Rmini CHOP, upon admission patient febrile and chemotherapy was held for now.  Prior cultures reviewed, history of ESBL Klebsiella in Ucx 12/31/24, ESBL E.coli 11/29/24 Ucx     Viral URI d/t coronavirus (not COVID), treated for possible bacterial pneumonia  Persistent fevers, worsening hypoxia likely due to PJP   - 1/17 RVP with Coronavirus (229E,HKU1,NL63,OC43) detected, repeat 1/28 also positive -likely ongoing viral shedding   - 1/17 CT Chest with extensive new b/l ground glass nodular opacities, upper lobe dominant - possible pneumonia   - 1/17 CTAP decreased R hepatic mass since 12/9/24; known splenic mass; changes likely c/w retroperitoneal fibrosis   - s/p zosyn 1/17-1/20, escalated to ertapenem 1/20pm d/t fevers but fevers continued with worsening hypoxia   - 1/23 CTAP with no significant changes, diffuse erosive changes in sacrum with soft tissue infiltration similar to prior, low suspicion for OM given no open wound in area  - 1/23 CT Chest with diffuse b/l patchy ggo with central predominance increased from 1/17/25 - edema vs pneumonia; unchanged LLL round atelectasis; small L pleural effusion   -- c/f PCP based on above repeat CT, ongoing fevers, worsening hypoxia now requiring HFNC, LDH increased, and h/o of being on steroids without ppx and iso malignancy, noted vancomycin added overnight, s/p hydrocortisone 1/22-1/23   - IP eval appreciated, pt prefers to avoid bronch d/t risk of difficulty weaning from vent  - 1/17, 1/19, 1/21, 1/24 Bcx remain NGTD  - 1/24 Aspergillus ag negative   1/25 ua negative, EBV serology c/w past infection, CMV PCR negative    1/26 fungitell > 500 - c/w suspicion for PJP  1/29 afebrile >48h now, WBC stable, weaned off HFNC-now on NC, no cough  1/31 WBC trend noted-likely reactive, now on NC 2L, afebrile, overall much improved     s/p zosyn 1/17-1/20  s/p ertapenem 1/20- 1/24  s/p vanc x1 on 1/24  s/p meropenem 1/24-1/26   started IV bactrim + prednisone 1/24-    Recommendations:   Changed from IV to oral Bactrim 2 DS (320mg) tabs PO Q8h - plan for 21d course until 2/13  Continue Prednisone 40mg PO daily x5d (1/29-2/2), then 20mg daily for 11 days (2/3-2/13)  Nephrology following, monitor renal function closely    Continue on valtrex ppx  Monitor temps/CBC  Aspiration precautions  Wound care, offloading as able, nutrition  Continue rest of care per primary team       Risa Ac M.D.  [facility]  Available on [employer] TEAMS - *PREFERRED*  [phone]  After 5pm on weekdays and all day on weekends - please call [phone]     Thank you for consulting us and involving us in the management of this patients case. In addition to reviewing history, imaging, documents, labs, microbiology, took into account antibiotic stewardship, local antibiogram and infection control strategies and potential transmission issues. Patient is a 69 year old male with PMH of renal transplant 2012 (2/2 DM, s/p left nephrectomy), peripheral neuropathy, hypothyroidism, retroperitoneal fibrosis, HTN, HLD, GERD, anxiety/depression, ANGELIKA and recent admission at [facility] for ESBL E.coli urosepsis, imaging with ?sacral OM though pt with no sacral wound suspected findings likely related to mets, he was discharged on 12/18/24 to rehab, recently diagnosed DLBCL while admitted to [place] when he was noted to have hypercalcemia and liver masses s/p biopsy 12/16/24, now s/p R mini CHOP on 12/28 who was admitted 1/17 for cycle #2 Rmini CHOP, upon admission patient febrile and chemotherapy was held for now.  Prior cultures reviewed, history of ESBL Klebsiella in Ucx 12/31/24, ESBL E.coli 11/29/24 Ucx     Viral URI d/t coronavirus (not COVID), treated for possible bacterial pneumonia  Persistent fevers, worsening hypoxia likely due to PJP   - 1/17 RVP with Coronavirus (229E,HKU1,NL63,OC43) detected, repeat 1/28 also positive -likely ongoing viral shedding   - 1/17 CT Chest with extensive new b/l ground glass nodular opacities, upper lobe dominant - possible pneumonia   - 1/17 CTAP decreased R hepatic mass since 12/9/24; known splenic mass; changes likely c/w retroperitoneal fibrosis   - s/p zosyn 1/17-1/20, escalated to ertapenem 1/20pm d/t fevers but fevers continued with worsening hypoxia   - 1/23 CTAP with no significant changes, diffuse erosive changes in sacrum with soft tissue infiltration similar to prior, low suspicion for OM given no open wound in area  - 1/23 CT Chest with diffuse b/l patchy ggo with central predominance increased from 1/17/25 - edema vs pneumonia; unchanged LLL round atelectasis; small L pleural effusion   -- c/f PCP based on above repeat CT, ongoing fevers, worsening hypoxia now requiring HFNC, LDH increased, and h/o of being on steroids without ppx and iso malignancy, noted vancomycin added overnight, s/p hydrocortisone 1/22-1/23   - IP eval appreciated, pt prefers to avoid bronch d/t risk of difficulty weaning from vent  - 1/17, 1/19, 1/21, 1/24 Bcx remain NGTD  - 1/24 Aspergillus ag negative   1/25 ua negative, EBV serology c/w past infection, CMV PCR negative    1/26 fungitell > 500 - c/w suspicion for PJP  1/29 afebrile >48h now, WBC stable, weaned off HFNC-now on NC, no cough  1/31 WBC trend noted-likely reactive, now on NC 2L, afebrile, overall much improved     s/p zosyn 1/17-1/20  s/p ertapenem 1/20- 1/24  s/p vanc x1 on 1/24  s/p meropenem 1/24-1/26   started IV bactrim + prednisone 1/24-    Recommendations:   Changed from IV to oral Bactrim 2 DS (320mg) tabs PO Q8h - plan for 21d course until 2/13  -then start on Bactrim DS 1 tab PO daily for ppx   Continue Prednisone 40mg PO daily x5d (1/29-2/2), then 20mg daily for 11 days (2/3-2/13)  Nephrology following, monitor renal function closely    Continue on valtrex ppx  Monitor temps/CBC  Aspiration precautions  Wound care, offloading as able, nutrition  Continue rest of care per primary team     D/w Dr. Alyson Ac M.D.  [facility]  Available on [employer] TEAMS - *PREFERRED*  [phone]  After 5pm on weekdays and all day on weekends - please call [phone]     Thank you for consulting us and involving us in the management of this patients case. In addition to reviewing history, imaging, documents, labs, microbiology, took into account antibiotic stewardship, local antibiogram and infection control strategies and potential transmission issues.

## 2025-01-31 NOTE — PROGRESS NOTE ADULT - PROBLEM SELECTOR PLAN 1
Admitted under heme onc service for R mini CHOP, found to be febrile on admission, plans for any chemo or tafasitamab/lenalidomide are now ON HOLD iso AHRF and c/f PJP PNA  Monitor CBC with diff, transfuse as needed to maintain Hb >7  Monitor electrolytes, replete as needed.  Daily weights.Strict I/O. mouth care    - consult onc for chemo treatment plan

## 2025-01-31 NOTE — PROGRESS NOTE ADULT - ASSESSMENT
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 Endocrinology consulted for hyperglycemia.   BG Goal 100-180mg/dl   Severe hypoglycemia ( serum BG 14 and POC BG 40/53) this am and pre lunch  after taking Lantus 34 units last night.   Currently on Prednisone 40 mg daily until 2/2. Then decrease to Prednisone 20 mg daily for 11 days   Will stop standing insulin for now and gradually start standing insulin if BGs > 180 X2      #Uncontrolled Type 2 Diabetes Mellitus with  #Steroid-induced hyperglycemia  - Follows with: Dr. Romero  - A1C with Estimated Average Glucose Result: 7.8 % (12-29-24)  - home regimen: Came from rehab, there he was on Lantus 38 units, Admelog 10 units with correction scale ( as per pt, he usually get Lantus 10-15 units BID and premeal insulin per sliding scale; usually 5-6 units at home, and uses Dexcom G6 with reader )   - eGFR: 70 mL/min/1.73m2 (01-26-25)  - glucose grossly elevated, no evidence of DKA on labs    Steroid schedule   Prednisone 40 mg BID  (1/24-1/28)  Prednisone 40 mg daily ( 1/29-2/2)  Prednisone 20 mg daily ( 2/3- 2/13)

## 2025-02-01 LAB
ALBUMIN SERPL ELPH-MCNC: 2.8 G/DL — LOW (ref 3.3–5)
ALP SERPL-CCNC: 83 U/L — SIGNIFICANT CHANGE UP (ref 40–120)
ALT FLD-CCNC: 22 U/L — SIGNIFICANT CHANGE UP (ref 10–45)
ANION GAP SERPL CALC-SCNC: 11 MMOL/L — SIGNIFICANT CHANGE UP (ref 5–17)
ANISOCYTOSIS BLD QL: SLIGHT — SIGNIFICANT CHANGE UP
AST SERPL-CCNC: 30 U/L — SIGNIFICANT CHANGE UP (ref 10–40)
BASOPHILS # BLD AUTO: 0 K/UL — SIGNIFICANT CHANGE UP (ref 0–0.2)
BASOPHILS NFR BLD AUTO: 0 % — SIGNIFICANT CHANGE UP (ref 0–2)
BILIRUB SERPL-MCNC: 0.2 MG/DL — SIGNIFICANT CHANGE UP (ref 0.2–1.2)
BUN SERPL-MCNC: 32 MG/DL — HIGH (ref 7–23)
CALCIUM SERPL-MCNC: 9.7 MG/DL — SIGNIFICANT CHANGE UP (ref 8.4–10.5)
CHLORIDE SERPL-SCNC: 99 MMOL/L — SIGNIFICANT CHANGE UP (ref 96–108)
CO2 SERPL-SCNC: 23 MMOL/L — SIGNIFICANT CHANGE UP (ref 22–31)
CREAT SERPL-MCNC: 0.84 MG/DL — SIGNIFICANT CHANGE UP (ref 0.5–1.3)
DACRYOCYTES BLD QL SMEAR: SLIGHT — SIGNIFICANT CHANGE UP
EGFR: 96 ML/MIN/1.73M2 — SIGNIFICANT CHANGE UP
ELLIPTOCYTES BLD QL SMEAR: SLIGHT — SIGNIFICANT CHANGE UP
EOSINOPHIL # BLD AUTO: 0 K/UL — SIGNIFICANT CHANGE UP (ref 0–0.5)
EOSINOPHIL NFR BLD AUTO: 0 % — SIGNIFICANT CHANGE UP (ref 0–6)
GLUCOSE BLDC GLUCOMTR-MCNC: 193 MG/DL — HIGH (ref 70–99)
GLUCOSE BLDC GLUCOMTR-MCNC: 322 MG/DL — HIGH (ref 70–99)
GLUCOSE BLDC GLUCOMTR-MCNC: 334 MG/DL — HIGH (ref 70–99)
GLUCOSE BLDC GLUCOMTR-MCNC: 364 MG/DL — HIGH (ref 70–99)
GLUCOSE BLDC GLUCOMTR-MCNC: 392 MG/DL — HIGH (ref 70–99)
GLUCOSE SERPL-MCNC: 193 MG/DL — HIGH (ref 70–99)
HCT VFR BLD CALC: 24.9 % — LOW (ref 39–50)
HGB BLD-MCNC: 7.8 G/DL — LOW (ref 13–17)
LYMPHOCYTES # BLD AUTO: 0.16 K/UL — LOW (ref 1–3.3)
LYMPHOCYTES # BLD AUTO: 1.8 % — LOW (ref 13–44)
MACROCYTES BLD QL: SLIGHT — SIGNIFICANT CHANGE UP
MAGNESIUM SERPL-MCNC: 1.8 MG/DL — SIGNIFICANT CHANGE UP (ref 1.6–2.6)
MANUAL SMEAR VERIFICATION: SIGNIFICANT CHANGE UP
MCHC RBC-ENTMCNC: 29 PG — SIGNIFICANT CHANGE UP (ref 27–34)
MCHC RBC-ENTMCNC: 31.3 G/DL — LOW (ref 32–36)
MCV RBC AUTO: 92.6 FL — SIGNIFICANT CHANGE UP (ref 80–100)
METAMYELOCYTES # FLD: 0.9 % — HIGH (ref 0–0)
METAMYELOCYTES NFR BLD: 0.9 % — HIGH (ref 0–0)
MONOCYTES # BLD AUTO: 0.4 K/UL — SIGNIFICANT CHANGE UP (ref 0–0.9)
MONOCYTES NFR BLD AUTO: 4.5 % — SIGNIFICANT CHANGE UP (ref 2–14)
NEUTROPHILS # BLD AUTO: 8.26 K/UL — HIGH (ref 1.8–7.4)
NEUTROPHILS NFR BLD AUTO: 91.9 % — HIGH (ref 43–77)
PHOSPHATE SERPL-MCNC: 2.6 MG/DL — SIGNIFICANT CHANGE UP (ref 2.5–4.5)
PLAT MORPH BLD: NORMAL — SIGNIFICANT CHANGE UP
PLATELET # BLD AUTO: 361 K/UL — SIGNIFICANT CHANGE UP (ref 150–400)
POIKILOCYTOSIS BLD QL AUTO: SLIGHT — SIGNIFICANT CHANGE UP
POTASSIUM SERPL-MCNC: 5.5 MMOL/L — HIGH (ref 3.5–5.3)
POTASSIUM SERPL-SCNC: 5.5 MMOL/L — HIGH (ref 3.5–5.3)
PROMYELOCYTES # FLD: 0.9 % — HIGH (ref 0–0)
PROMYELOCYTES NFR BLD: 0.9 % — HIGH (ref 0–0)
PROT SERPL-MCNC: 5.5 G/DL — LOW (ref 6–8.3)
RBC # BLD: 2.69 M/UL — LOW (ref 4.2–5.8)
RBC # FLD: 19.4 % — HIGH (ref 10.3–14.5)
RBC BLD AUTO: ABNORMAL
SODIUM SERPL-SCNC: 133 MMOL/L — LOW (ref 135–145)
SPHEROCYTES BLD QL SMEAR: SLIGHT — SIGNIFICANT CHANGE UP
WBC # BLD: 8.99 K/UL — SIGNIFICANT CHANGE UP (ref 3.8–10.5)
WBC # FLD AUTO: 8.99 K/UL — SIGNIFICANT CHANGE UP (ref 3.8–10.5)

## 2025-02-01 PROCEDURE — 99232 SBSQ HOSP IP/OBS MODERATE 35: CPT | Mod: GC

## 2025-02-01 RX ORDER — INSULIN LISPRO 100/ML
14 VIAL (ML) SUBCUTANEOUS
Refills: 0 | Status: DISCONTINUED | OUTPATIENT
Start: 2025-02-02 | End: 2025-02-03

## 2025-02-01 RX ORDER — LACTULOSE 10 G/15 ML
15 SOLUTION, ORAL ORAL EVERY 8 HOURS
Refills: 0 | Status: DISCONTINUED | OUTPATIENT
Start: 2025-02-01 | End: 2025-02-06

## 2025-02-01 RX ORDER — INSULIN LISPRO 100/ML
14 VIAL (ML) SUBCUTANEOUS
Refills: 0 | Status: DISCONTINUED | OUTPATIENT
Start: 2025-02-02 | End: 2025-02-05

## 2025-02-01 RX ORDER — INSULIN GLARGINE-YFGN 100 [IU]/ML
9 INJECTION, SOLUTION SUBCUTANEOUS AT BEDTIME
Refills: 0 | Status: DISCONTINUED | OUTPATIENT
Start: 2025-02-01 | End: 2025-02-03

## 2025-02-01 RX ORDER — INSULIN LISPRO 100/ML
7 VIAL (ML) SUBCUTANEOUS
Refills: 0 | Status: DISCONTINUED | OUTPATIENT
Start: 2025-02-01 | End: 2025-02-01

## 2025-02-01 RX ORDER — INSULIN LISPRO 100/ML
8 VIAL (ML) SUBCUTANEOUS
Refills: 0 | Status: DISCONTINUED | OUTPATIENT
Start: 2025-02-01 | End: 2025-02-02

## 2025-02-01 RX ADMIN — Medication 10: at 17:43

## 2025-02-01 RX ADMIN — Medication 2: at 09:08

## 2025-02-01 RX ADMIN — BUPROPION HYDROCHLORIDE 300 MILLIGRAM(S): 150 TABLET, EXTENDED RELEASE ORAL at 11:31

## 2025-02-01 RX ADMIN — Medication 1 DROP(S): at 17:45

## 2025-02-01 RX ADMIN — Medication 100 MILLIGRAM(S): at 21:07

## 2025-02-01 RX ADMIN — CLONIDINE HYDROCHLORIDE 0.1 MILLIGRAM(S): 0.2 TABLET ORAL at 21:07

## 2025-02-01 RX ADMIN — LEVOTHYROXINE SODIUM 88 MICROGRAM(S): 25 TABLET ORAL at 05:05

## 2025-02-01 RX ADMIN — SULFAMETHOXAZOLE AND TRIMETHOPRIM 2 TABLET(S): 400; 80 TABLET ORAL at 22:09

## 2025-02-01 RX ADMIN — VALACYCLOVIR 500 MILLIGRAM(S): 1000 TABLET ORAL at 17:45

## 2025-02-01 RX ADMIN — SULFAMETHOXAZOLE AND TRIMETHOPRIM 2 TABLET(S): 400; 80 TABLET ORAL at 14:31

## 2025-02-01 RX ADMIN — Medication 15 GRAM(S): at 14:29

## 2025-02-01 RX ADMIN — VALACYCLOVIR 500 MILLIGRAM(S): 1000 TABLET ORAL at 05:05

## 2025-02-01 RX ADMIN — Medication 8: at 13:03

## 2025-02-01 RX ADMIN — Medication 100 MILLIGRAM(S): at 14:29

## 2025-02-01 RX ADMIN — Medication 2 TABLET(S): at 21:07

## 2025-02-01 RX ADMIN — PANTOPRAZOLE 40 MILLIGRAM(S): 20 TABLET, DELAYED RELEASE ORAL at 06:06

## 2025-02-01 RX ADMIN — Medication 15 GRAM(S): at 05:05

## 2025-02-01 RX ADMIN — CLONIDINE HYDROCHLORIDE 0.1 MILLIGRAM(S): 0.2 TABLET ORAL at 14:29

## 2025-02-01 RX ADMIN — INSULIN GLARGINE-YFGN 9 UNIT(S): 100 INJECTION, SOLUTION SUBCUTANEOUS at 21:08

## 2025-02-01 RX ADMIN — Medication 7 UNIT(S): at 09:08

## 2025-02-01 RX ADMIN — Medication 12.5 MILLIGRAM(S): at 17:45

## 2025-02-01 RX ADMIN — SULFAMETHOXAZOLE AND TRIMETHOPRIM 2 TABLET(S): 400; 80 TABLET ORAL at 06:06

## 2025-02-01 RX ADMIN — Medication 1 DROP(S): at 11:31

## 2025-02-01 RX ADMIN — ROSUVASTATIN CALCIUM 10 MILLIGRAM(S): 10 TABLET, FILM COATED ORAL at 21:08

## 2025-02-01 RX ADMIN — SODIUM ZIRCONIUM CYCLOSILICATE 10 GRAM(S): 5 POWDER, FOR SUSPENSION ORAL at 13:01

## 2025-02-01 RX ADMIN — Medication 6: at 22:09

## 2025-02-01 RX ADMIN — POLYETHYLENE GLYCOL 3350 17 GRAM(S): 17 POWDER, FOR SOLUTION ORAL at 17:45

## 2025-02-01 RX ADMIN — Medication 4: at 02:03

## 2025-02-01 RX ADMIN — PREDNISONE 40 MILLIGRAM(S): 5 TABLET ORAL at 05:05

## 2025-02-01 RX ADMIN — Medication 15 GRAM(S): at 21:08

## 2025-02-01 RX ADMIN — Medication 1 DROP(S): at 05:05

## 2025-02-01 RX ADMIN — ENOXAPARIN SODIUM 40 MILLIGRAM(S): 100 INJECTION SUBCUTANEOUS at 22:08

## 2025-02-01 RX ADMIN — Medication 7 UNIT(S): at 13:03

## 2025-02-01 RX ADMIN — ESCITALOPRAM 10 MILLIGRAM(S): 10 TABLET, FILM COATED ORAL at 11:31

## 2025-02-01 RX ADMIN — POLYETHYLENE GLYCOL 3350 17 GRAM(S): 17 POWDER, FOR SOLUTION ORAL at 05:05

## 2025-02-01 RX ADMIN — Medication 8 UNIT(S): at 17:44

## 2025-02-01 RX ADMIN — ANTISEPTIC SURGICAL SCRUB 1 APPLICATION(S): 0.04 SOLUTION TOPICAL at 09:07

## 2025-02-01 NOTE — PROGRESS NOTE ADULT - ASSESSMENT
68 y/o M PMHx renal transplant 2012 (2/2 DM, s/p left nephrectomy), peripheral neuropathy, hypothyroidism, retroperitoneal fibrosis, HTN, HLD, GERD, anxiety/depression, ANGELIKA and recent admission at [facility] for sacral osteomyelitis and ESBL.coli urosepsis discharged on 12/18/24 to rehab. While admitted to [place] was noted to have hypercalcemia and liver masses which were biopsied on 12/16/24, biopsy revealed DLBCL. Patient received R mini CHOP on 12/28 now admitted for cycle #2 R-mini CHOP, upon admission patient is febrile and chemotherapy held, course c/b AHRF and c/f PJP pneumonia

## 2025-02-01 NOTE — PROGRESS NOTE ADULT - PROBLEM SELECTOR PLAN 2
1/17 Admitted + for Coronavirus, 1/24 - CT chest with c/f new PCP pneumonia, recs DC IV vanc, switch IV erta to IV mick given hypoalbuminemia efficacy concerns and start IV bactrim for empiric PCP PNA treatment as well as steroid taper pred 40mg BID x5 days followed by pred 40mg QD x5d then 20mg QD.   1/24 fungitell high > 500; pending galactomannan, beta D glucan, Initially on HFNC, weaned to 6L NC     Plan: * Changed bactrim to PO*  -wean O2 as tolerated and monitor on pulse ox, currently on 2L NC  -f/u sputum culture and sputum for PCP PCR if able to expectorate   -f/u aspergillus Ag, in process   -Bactrim 320mg IV Q8h (based on pts weight) - plan for 21d course, now on PO  -c/w Prednisone 40mg PO BID x5d until 1/28 then 40mg daily x5d (1/29-2/2), then 20mg daily for 11 days (2/3-2/13)  -Continue on valtrex ppx  - Monitor temps/CBC

## 2025-02-01 NOTE — PROGRESS NOTE ADULT - ASSESSMENT
Patient is a 69 year old male with PMH of renal transplant 2012 (2/2 DM, s/p left nephrectomy), peripheral neuropathy, hypothyroidism, retroperitoneal fibrosis, HTN, HLD, GERD, anxiety/depression, ANGELIKA and recent admission at [facility] for ESBL E.coli urosepsis, imaging with ?sacral OM though pt with no sacral wound suspected findings likely related to mets, he was discharged on 12/18/24 to rehab, recently diagnosed DLBCL while admitted to [place] when he was noted to have hypercalcemia and liver masses s/p biopsy 12/16/24, now s/p R mini CHOP on 12/28 who was admitted 1/17 for cycle #2 Rmini CHOP, upon admission patient febrile and chemotherapy was held for now.  Prior cultures reviewed, history of ESBL Klebsiella in Ucx 12/31/24, ESBL E.coli 11/29/24 Ucx     Viral URI d/t coronavirus (not COVID), treated for possible bacterial pneumonia  Persistent fevers, worsening hypoxia likely due to PJP   - 1/17 RVP with Coronavirus (229E,HKU1,NL63,OC43) detected, repeat 1/28 also positive -likely ongoing viral shedding   - 1/17 CT Chest with extensive new b/l ground glass nodular opacities, upper lobe dominant - possible pneumonia   - 1/17 CTAP decreased R hepatic mass since 12/9/24; known splenic mass; changes likely c/w retroperitoneal fibrosis   - s/p zosyn 1/17-1/20, escalated to ertapenem 1/20pm d/t fevers but fevers continued with worsening hypoxia   - 1/23 CTAP with no significant changes, diffuse erosive changes in sacrum with soft tissue infiltration similar to prior, low suspicion for OM given no open wound in area  - 1/23 CT Chest with diffuse b/l patchy ggo with central predominance increased from 1/17/25 - edema vs pneumonia; unchanged LLL round atelectasis; small L pleural effusion   -- c/f PCP based on above repeat CT, ongoing fevers, worsening hypoxia now requiring HFNC, LDH increased, and h/o of being on steroids without ppx and iso malignancy, noted vancomycin added overnight, s/p hydrocortisone 1/22-1/23   - IP eval appreciated, pt prefers to avoid bronch d/t risk of difficulty weaning from vent  - 1/17, 1/19, 1/21, 1/24 Bcx remain NGTD  - 1/24 Aspergillus ag negative   1/25 ua negative, EBV serology c/w past infection, CMV PCR negative    1/26 fungitell > 500 - c/w suspicion for PJP  1/29 afebrile >48h now, WBC stable, weaned off HFNC-now on NC, no cough  1/31 WBC trend noted-likely reactive, now on NC 2L, afebrile, overall much improved   2/1 WBC normalized, on NC 2L    s/p zosyn 1/17-1/20  s/p ertapenem 1/20- 1/24  s/p vancomycin x1 on 1/24  s/p meropenem 1/24-1/26   s/p IV bactrim 1/24-1/31    Recommendations:   Continue Bactrim 2 DS (320mg) tabs PO Q8h to complete 21d course on 2/13  -then start on Bactrim DS 1 tab PO daily for ppx   Continue Prednisone 40mg PO daily x5d (1/29-2/2), then 20mg daily for 11 days (2/3-2/13)  Nephrology following, monitor renal function closely    Continue on valtrex ppx  Monitor temps/CBC  Aspiration precautions  Wound care, offloading as able, nutrition  Continue rest of care per primary team       Risa Ac M.D.  [facility]  Available on [employer] TEAMS - *PREFERRED*  [phone]  After 5pm on weekdays and all day on weekends - please call [phone]     Thank you for consulting us and involving us in the management of this patients case. In addition to reviewing history, imaging, documents, labs, microbiology, took into account antibiotic stewardship, local antibiogram and infection control strategies and potential transmission issues.

## 2025-02-01 NOTE — PROGRESS NOTE ADULT - SUBJECTIVE AND OBJECTIVE BOX
Rod Elliott, PGY-2  Please contact on Teams    JAN CALVIN  69y  Male    Reason for Admission:     SUBJECTIVE/INTERVAL EVENTS: No acute events. Pt seen and examined at bedside.    Vital Signs Last 24 Hrs  T(C): 36.6 (01 Feb 2025 04:31), Max: 37.3 (31 Jan 2025 19:59)  T(F): 97.9 (01 Feb 2025 04:31), Max: 99.1 (31 Jan 2025 19:59)  HR: 66 (01 Feb 2025 04:31) (66 - 79)  BP: 144/56 (01 Feb 2025 04:31) (108/45 - 144/56)  BP(mean): --  RR: 18 (01 Feb 2025 04:31) (18 - 18)  SpO2: 95% (01 Feb 2025 04:31) (94% - 95%)    Parameters below as of 01 Feb 2025 04:31  Patient On (Oxygen Delivery Method): nasal cannula        Physical Exam:   General: NAD  HEENT: PERRL, normal sclera/conjunctiva  Neck: Supple  Lungs: b/l crackles  Heart: RRR, normal S1S2, no murmurs/rubs/gallops  Abdomen: Soft, ND/NT  Extremities: no edema  Skin: Warm, well-perfused  Neuro: A&O x3, no focal deficits    Consultant(s) Notes Reviewed:  [x] YES  [ ] NO  Care Discussed with Consultants/Other Providers [x] YES  [ ] NO    LABS:                        7.8    8.99  )-----------( 361      ( 01 Feb 2025 06:55 )             24.9                         9.3    13.05 )-----------( 498      ( 31 Jan 2025 07:10 )             29.1                         7.6    12.21 )-----------( 428      ( 30 Jan 2025 07:05 )             24.0                               133    |  99     |  32                  Calcium: 9.7   / iCa: x      (02-01 @ 06:54)    ----------------------------<  193       Magnesium: 1.8                              5.5     |  23     |  0.84             Phosphorous: 2.6      TPro  5.5    /  Alb  2.8    /  TBili  0.2    /  DBili  x      /  AST  30     /  ALT  22     /  AlkPhos  83     01 Feb 2025 06:54    Urinalysis Basic - ( 01 Feb 2025 06:54 )    Color: x / Appearance: x / SG: x / pH: x  Gluc: 193 mg/dL / Ketone: x  / Bili: x / Urobili: x   Blood: x / Protein: x / Nitrite: x   Leuk Esterase: x / RBC: x / WBC x   Sq Epi: x / Non Sq Epi: x / Bacteria: x        RADIOLOGY & ADDITIONAL TESTS:    Imaging Personally Reviewed:  [x] YES  [ ] NO    MEDICATIONS  (STANDING):  amLODIPine   Tablet 10 milliGRAM(s) Oral daily  artificial tears (preservative free) Ophthalmic Solution 1 Drop(s) Both EYES every 6 hours  buPROPion XL (24-Hour) . 300 milliGRAM(s) Oral daily  carvedilol 12.5 milliGRAM(s) Oral every 12 hours  chlorhexidine 4% Liquid 1 Application(s) Topical <User Schedule>  cloNIDine 0.1 milliGRAM(s) Oral three times a day  dextrose 5%. 1000 milliLiter(s) (50 mL/Hr) IV Continuous <Continuous>  dextrose 5%. 1000 milliLiter(s) (100 mL/Hr) IV Continuous <Continuous>  dextrose 50% Injectable 25 Gram(s) IV Push once  enoxaparin Injectable 40 milliGRAM(s) SubCutaneous <User Schedule>  escitalopram 10 milliGRAM(s) Oral daily  glucagon  Injectable 1 milliGRAM(s) IntraMuscular once  hydrALAZINE 100 milliGRAM(s) Oral every 8 hours  insulin glargine Injectable (LANTUS) 7 Unit(s) SubCutaneous at bedtime  insulin lispro (ADMELOG) corrective regimen sliding scale   SubCutaneous <User Schedule>  insulin lispro (ADMELOG) corrective regimen sliding scale   SubCutaneous three times a day before meals  insulin lispro Injectable (ADMELOG) 7 Unit(s) SubCutaneous three times a day before meals  lactulose Syrup 15 Gram(s) Oral every 8 hours  levothyroxine 88 MICROGram(s) Oral daily  pantoprazole    Tablet 40 milliGRAM(s) Oral before breakfast  polyethylene glycol 3350 17 Gram(s) Oral every 12 hours  predniSONE   Tablet 40 milliGRAM(s) Oral daily  rosuvastatin 10 milliGRAM(s) Oral at bedtime  senna 2 Tablet(s) Oral at bedtime  sodium zirconium cyclosilicate 10 Gram(s) Oral daily  trimethoprim  160 mG/sulfamethoxazole 800 mG 2 Tablet(s) Oral every 8 hours  valACYclovir 500 milliGRAM(s) Oral every 12 hours    MEDICATIONS  (PRN):  acetaminophen     Tablet .. 650 milliGRAM(s) Oral every 6 hours PRN Temp greater or equal to 38C (100.4F), Mild Pain (1 - 3)  dextrose Oral Gel 15 Gram(s) Oral once PRN Blood Glucose LESS THAN 70 milliGRAM(s)/deciliter  sodium chloride 0.9% lock flush 10 milliLiter(s) IV Push every 1 hour PRN Pre/post blood products, medications, blood draw, and to maintain line patency      HEALTH ISSUES - PROBLEM Dx:  DLBCL (diffuse large B cell lymphoma)    Diabetes mellitus, type 2    Hypothyroidism    Kidney transplanted    Hypertension    Prophylactic measure    Major depression    Hyperlipidemia    Sacral decubitus ulcer    Chronic GERD    Steroid-induced hyperglycemia    López Agitation Sedation Scale (RASS) score minus 3, moderate sedation    Agitation    Acute hypoxemic respiratory failure    Type 2 diabetes mellitus with hyperglycemia, with long-term current use of insulin

## 2025-02-01 NOTE — CHART NOTE - NSCHARTNOTEFT_GEN_A_CORE
POCT Blood Glucose:  334 mg/dL (02-01-25 @ 12:45)  193 mg/dL (02-01-25 @ 08:27)  322 mg/dL (02-01-25 @ 01:57)  371 mg/dL (01-31-25 @ 21:37)  385 mg/dL (01-31-25 @ 17:20)      eMAR:  insulin glargine Injectable (LANTUS)   7 Unit(s) SubCutaneous (01-31-25 @ 21:59)    insulin lispro (ADMELOG) corrective regimen sliding scale   4 Unit(s) SubCutaneous (02-01-25 @ 02:03)   6 Unit(s) SubCutaneous (01-31-25 @ 21:53)    insulin lispro (ADMELOG) corrective regimen sliding scale   8 Unit(s) SubCutaneous (02-01-25 @ 13:03)   2 Unit(s) SubCutaneous (02-01-25 @ 09:08)   10 Unit(s) SubCutaneous (01-31-25 @ 17:49)    insulin lispro Injectable (ADMELOG)   7 Unit(s) SubCutaneous (02-01-25 @ 13:03)   7 Unit(s) SubCutaneous (02-01-25 @ 09:08)    levothyroxine   88 MICROGram(s) Oral (02-01-25 @ 05:05)    predniSONE   Tablet   40 milliGRAM(s) Oral (02-01-25 @ 05:05)    rosuvastatin   10 milliGRAM(s) Oral (01-31-25 @ 21:54)    Diet, Consistent Carbohydrate/No Snacks:   Supplement Feeding Modality:  Oral  Glucerna Shake Cans or Servings Per Day:  1       Frequency:  Daily (01-26-25 @ 23:00) [Active]      BGs and insulin regimen reviewed   Last 24 hour BG values 193-300s with fasting . Currently on Prednisone 40 mg daily taking at 6 am. No hypoglycemia noted overnight. Noted significant BG decrease in am 193 from 371, with Lantus 7 units and bolus Admelog 6 units and 4 units     - Will cautiously increase Lantus to 9 units at HS  - Adjust Admelog to 12 units with breakfast, 12 units with lunch and 8 units with dinner ( Hold if NPO)       Contact via Microsoft Teams during business hours  To reach covering provider access AMION via sunrise tools  For Urgent matters/after-hours/weekends/holidays please page endocrine fellow on call   For nonurgent matters please email [email or web address]    Please note that this patient may be followed by different provider tomorrow.  Notify endocrine 24 hours prior to discharge for final recommendations POCT Blood Glucose:  334 mg/dL (02-01-25 @ 12:45)  193 mg/dL (02-01-25 @ 08:27)  322 mg/dL (02-01-25 @ 01:57)  371 mg/dL (01-31-25 @ 21:37)  385 mg/dL (01-31-25 @ 17:20)      eMAR:  insulin glargine Injectable (LANTUS)   7 Unit(s) SubCutaneous (01-31-25 @ 21:59)    insulin lispro (ADMELOG) corrective regimen sliding scale   4 Unit(s) SubCutaneous (02-01-25 @ 02:03)   6 Unit(s) SubCutaneous (01-31-25 @ 21:53)    insulin lispro (ADMELOG) corrective regimen sliding scale   8 Unit(s) SubCutaneous (02-01-25 @ 13:03)   2 Unit(s) SubCutaneous (02-01-25 @ 09:08)   10 Unit(s) SubCutaneous (01-31-25 @ 17:49)    insulin lispro Injectable (ADMELOG)   7 Unit(s) SubCutaneous (02-01-25 @ 13:03)   7 Unit(s) SubCutaneous (02-01-25 @ 09:08)    levothyroxine   88 MICROGram(s) Oral (02-01-25 @ 05:05)    predniSONE   Tablet   40 milliGRAM(s) Oral (02-01-25 @ 05:05)    rosuvastatin   10 milliGRAM(s) Oral (01-31-25 @ 21:54)    Diet, Consistent Carbohydrate/No Snacks:   Supplement Feeding Modality:  Oral  Glucerna Shake Cans or Servings Per Day:  1       Frequency:  Daily (01-26-25 @ 23:00) [Active]      BGs and insulin regimen reviewed   Last 24 hour BG values 193-300s with fasting . Currently on Prednisone 40 mg daily taking at 6 am. No hypoglycemia noted overnight. Noted significant BG decrease in am 193 from 371, with Lantus 7 units and bolus Admelog 6 units and 4 units     - Will cautiously increase Lantus to 9 units at HS  - Adjust Admelog to 14 units with breakfast, 14units with lunch and 8 units with dinner ( Hold if NPO)       Contact via Microsoft Teams during business hours  To reach covering provider access AMION via sunrise tools  For Urgent matters/after-hours/weekends/holidays please page endocrine fellow on call   For nonurgent matters please email [email or web address]    Please note that this patient may be followed by different provider tomorrow.  Notify endocrine 24 hours prior to discharge for final recommendations

## 2025-02-01 NOTE — PROGRESS NOTE ADULT - SUBJECTIVE AND OBJECTIVE BOX
ISLAND INFECTIOUS DISEASE  WANG Mccoy Y. Patel, S. Shah, G. Casimir  [phone]  ([phone] - weekdays after 5pm and weekends)    Name: JAN CALVIN  Age/Gender: 69y Male  MRN: [id number]    Interval History:  Patient seen and examined this morning.   Resting comfortably, no new complaints.   Notes reviewed  No concerning overnight events  Afebrile   Allergies: No Known Allergies      Objective:  Vitals:   T(F): 97.9 (02-01-25 @ 04:31), Max: 99.1 (01-31-25 @ 19:59)  HR: 66 (02-01-25 @ 04:31) (58 - 79)  BP: 144/56 (02-01-25 @ 04:31) (108/45 - 144/56)  RR: 18 (02-01-25 @ 04:31) (18 - 18)  SpO2: 95% (02-01-25 @ 04:31) (91% - 95%)  Physical Examination:  General: no acute distress, NC 2L  HEENT: normocephalic, atraumatic, anicteric  Respiratory: no acc muscle use, breathing comfortably  Cardiovascular: S1 and S2 present  Gastrointestinal: normal appearing, nondistended  Extremities: no edema, no cyanosis  Skin: no visible rash    Laboratory Studies:  CBC:                       7.8    8.99  )-----------( 361      ( 01 Feb 2025 06:55 )             24.9     WBC Trend:  8.99 02-01-25 @ 06:55  13.05 01-31-25 @ 07:10  12.21 01-30-25 @ 07:05  10.78 01-29-25 @ 05:02  10.57 01-28-25 @ 07:36  8.17 01-27-25 @ 06:58  11.69 01-26-25 @ 06:53    CMP: 02-01    133[L]  |  99  |  32[H]  ----------------------------<  193[H]  5.5[H]   |  23  |  0.84    Ca    9.7      01 Feb 2025 06:54  Phos  2.6     02-01  Mg     1.8     02-01    TPro  5.5[L]  /  Alb  2.8[L]  /  TBili  0.2  /  DBili  x   /  AST  30  /  ALT  22  /  AlkPhos  83  02-01    Creatinine: 0.84 mg/dL (02-01-25 @ 06:54)  Creatinine: 0.98 mg/dL (01-31-25 @ 07:11)  Creatinine: 0.95 mg/dL (01-30-25 @ 07:06)  Creatinine: 1.05 mg/dL (01-29-25 @ 05:02)  Creatinine: 1.12 mg/dL (01-28-25 @ 07:34)  Creatinine: 0.87 mg/dL (01-27-25 @ 07:00)  Creatinine: 1.01 mg/dL (01-26-25 @ 17:42)  Creatinine: 1.14 mg/dL (01-26-25 @ 08:11)  Creatinine: 1.17 mg/dL (01-26-25 @ 06:54)    LIVER FUNCTIONS - ( 01 Feb 2025 06:54 )  Alb: 2.8 g/dL / Pro: 5.5 g/dL / ALK PHOS: 83 U/L / ALT: 22 U/L / AST: 30 U/L / GGT: x           Microbiology: reviewed   Culture - Blood (collected 01-24-25 @ 00:46)  Source: .Blood BLOOD  Final Report (01-29-25 @ 04:00):    No growth at 5 days    Culture - Blood (collected 01-24-25 @ 00:18)  Source: .Blood BLOOD  Final Report (01-29-25 @ 04:00):    No growth at 5 days    Culture - Urine (collected 01-22-25 @ 05:05)  Source: Clean Catch Clean Catch (Midstream)  Final Report (01-24-25 @ 13:31):    10,000 - 49,000 CFU/mL Candida albicans    "Susceptibilities not performed"    Culture - Blood (collected 01-21-25 @ 22:25)  Source: .Blood BLOOD  Final Report (01-27-25 @ 03:00):    No growth at 5 days    Culture - Blood (collected 01-21-25 @ 22:07)  Source: .Blood BLOOD  Final Report (01-27-25 @ 03:00):    No growth at 5 days    Culture - Blood (collected 01-19-25 @ 18:36)  Source: .Blood BLOOD  Final Report (01-24-25 @ 23:00):    No growth at 5 days    Culture - Urine (collected 01-19-25 @ 18:36)  Source: Clean Catch Clean Catch (Midstream)  Final Report (01-20-25 @ 21:26):    10,000 - 49,000 CFU/mL Candida albicans    "Susceptibilities not performed"    01-28-25 @ 17:33 SARS-CoV-2 NotDetec/Influenza A NotDetec/Influenza B NotDetec/RSV NotDetec    Radiology: reviewed     Medications:  acetaminophen     Tablet .. 650 milliGRAM(s) Oral every 6 hours PRN  amLODIPine   Tablet 10 milliGRAM(s) Oral daily  artificial tears (preservative free) Ophthalmic Solution 1 Drop(s) Both EYES every 6 hours  buPROPion XL (24-Hour) . 300 milliGRAM(s) Oral daily  carvedilol 12.5 milliGRAM(s) Oral every 12 hours  chlorhexidine 4% Liquid 1 Application(s) Topical <User Schedule>  cloNIDine 0.1 milliGRAM(s) Oral three times a day  dextrose 5%. 1000 milliLiter(s) IV Continuous <Continuous>  dextrose 5%. 1000 milliLiter(s) IV Continuous <Continuous>  dextrose 50% Injectable 25 Gram(s) IV Push once  dextrose Oral Gel 15 Gram(s) Oral once PRN  enoxaparin Injectable 40 milliGRAM(s) SubCutaneous <User Schedule>  escitalopram 10 milliGRAM(s) Oral daily  glucagon  Injectable 1 milliGRAM(s) IntraMuscular once  hydrALAZINE 100 milliGRAM(s) Oral every 8 hours  insulin glargine Injectable (LANTUS) 7 Unit(s) SubCutaneous at bedtime  insulin lispro (ADMELOG) corrective regimen sliding scale   SubCutaneous <User Schedule>  insulin lispro (ADMELOG) corrective regimen sliding scale   SubCutaneous three times a day before meals  lactulose Syrup 15 Gram(s) Oral every 12 hours  levothyroxine 88 MICROGram(s) Oral daily  pantoprazole    Tablet 40 milliGRAM(s) Oral before breakfast  polyethylene glycol 3350 17 Gram(s) Oral every 12 hours  predniSONE   Tablet 40 milliGRAM(s) Oral daily  rosuvastatin 10 milliGRAM(s) Oral at bedtime  senna 2 Tablet(s) Oral at bedtime  sodium chloride 0.9% lock flush 10 milliLiter(s) IV Push every 1 hour PRN  sodium zirconium cyclosilicate 10 Gram(s) Oral daily  trimethoprim  160 mG/sulfamethoxazole 800 mG 2 Tablet(s) Oral every 8 hours  valACYclovir 500 milliGRAM(s) Oral every 12 hours    Current Antimicrobials:  trimethoprim  160 mG/sulfamethoxazole 800 mG 2 Tablet(s) Oral every 8 hours  valACYclovir 500 milliGRAM(s) Oral every 12 hours    Prior/Completed Antimicrobials:  ertapenem  IVPB  piperacillin/tazobactam IVPB.  piperacillin/tazobactam IVPB.-  vancomycin  IVPB

## 2025-02-02 LAB
ALBUMIN SERPL ELPH-MCNC: 2.7 G/DL — LOW (ref 3.3–5)
ALP SERPL-CCNC: 85 U/L — SIGNIFICANT CHANGE UP (ref 40–120)
ALT FLD-CCNC: 22 U/L — SIGNIFICANT CHANGE UP (ref 10–45)
ANION GAP SERPL CALC-SCNC: 10 MMOL/L — SIGNIFICANT CHANGE UP (ref 5–17)
ANION GAP SERPL CALC-SCNC: 8 MMOL/L — SIGNIFICANT CHANGE UP (ref 5–17)
ANION GAP SERPL CALC-SCNC: 8 MMOL/L — SIGNIFICANT CHANGE UP (ref 5–17)
ANION GAP SERPL CALC-SCNC: 9 MMOL/L — SIGNIFICANT CHANGE UP (ref 5–17)
AST SERPL-CCNC: 24 U/L — SIGNIFICANT CHANGE UP (ref 10–40)
BASOPHILS # BLD AUTO: 0.02 K/UL — SIGNIFICANT CHANGE UP (ref 0–0.2)
BASOPHILS NFR BLD AUTO: 0.2 % — SIGNIFICANT CHANGE UP (ref 0–2)
BILIRUB SERPL-MCNC: 0.2 MG/DL — SIGNIFICANT CHANGE UP (ref 0.2–1.2)
BUN SERPL-MCNC: 29 MG/DL — HIGH (ref 7–23)
BUN SERPL-MCNC: 32 MG/DL — HIGH (ref 7–23)
BUN SERPL-MCNC: 33 MG/DL — HIGH (ref 7–23)
BUN SERPL-MCNC: 34 MG/DL — HIGH (ref 7–23)
CALCIUM SERPL-MCNC: 10 MG/DL — SIGNIFICANT CHANGE UP (ref 8.4–10.5)
CALCIUM SERPL-MCNC: 10.1 MG/DL — SIGNIFICANT CHANGE UP (ref 8.4–10.5)
CALCIUM SERPL-MCNC: 10.6 MG/DL — HIGH (ref 8.4–10.5)
CALCIUM SERPL-MCNC: 10.8 MG/DL — HIGH (ref 8.4–10.5)
CHLORIDE SERPL-SCNC: 100 MMOL/L — SIGNIFICANT CHANGE UP (ref 96–108)
CHLORIDE SERPL-SCNC: 98 MMOL/L — SIGNIFICANT CHANGE UP (ref 96–108)
CHLORIDE SERPL-SCNC: 99 MMOL/L — SIGNIFICANT CHANGE UP (ref 96–108)
CHLORIDE SERPL-SCNC: 99 MMOL/L — SIGNIFICANT CHANGE UP (ref 96–108)
CO2 SERPL-SCNC: 23 MMOL/L — SIGNIFICANT CHANGE UP (ref 22–31)
CO2 SERPL-SCNC: 23 MMOL/L — SIGNIFICANT CHANGE UP (ref 22–31)
CO2 SERPL-SCNC: 24 MMOL/L — SIGNIFICANT CHANGE UP (ref 22–31)
CO2 SERPL-SCNC: 25 MMOL/L — SIGNIFICANT CHANGE UP (ref 22–31)
CREAT SERPL-MCNC: 0.75 MG/DL — SIGNIFICANT CHANGE UP (ref 0.5–1.3)
CREAT SERPL-MCNC: 0.8 MG/DL — SIGNIFICANT CHANGE UP (ref 0.5–1.3)
CREAT SERPL-MCNC: 0.86 MG/DL — SIGNIFICANT CHANGE UP (ref 0.5–1.3)
CREAT SERPL-MCNC: 0.92 MG/DL — SIGNIFICANT CHANGE UP (ref 0.5–1.3)
EGFR: 91 ML/MIN/1.73M2 — SIGNIFICANT CHANGE UP
EGFR: 95 ML/MIN/1.73M2 — SIGNIFICANT CHANGE UP
EGFR: 97 ML/MIN/1.73M2 — SIGNIFICANT CHANGE UP
EGFR: 99 ML/MIN/1.73M2 — SIGNIFICANT CHANGE UP
EOSINOPHIL # BLD AUTO: 0.03 K/UL — SIGNIFICANT CHANGE UP (ref 0–0.5)
EOSINOPHIL NFR BLD AUTO: 0.3 % — SIGNIFICANT CHANGE UP (ref 0–6)
GLUCOSE BLDC GLUCOMTR-MCNC: 159 MG/DL — HIGH (ref 70–99)
GLUCOSE BLDC GLUCOMTR-MCNC: 210 MG/DL — HIGH (ref 70–99)
GLUCOSE BLDC GLUCOMTR-MCNC: 213 MG/DL — HIGH (ref 70–99)
GLUCOSE BLDC GLUCOMTR-MCNC: 246 MG/DL — HIGH (ref 70–99)
GLUCOSE BLDC GLUCOMTR-MCNC: 253 MG/DL — HIGH (ref 70–99)
GLUCOSE BLDC GLUCOMTR-MCNC: 275 MG/DL — HIGH (ref 70–99)
GLUCOSE BLDC GLUCOMTR-MCNC: 279 MG/DL — HIGH (ref 70–99)
GLUCOSE BLDC GLUCOMTR-MCNC: 293 MG/DL — HIGH (ref 70–99)
GLUCOSE BLDC GLUCOMTR-MCNC: 298 MG/DL — HIGH (ref 70–99)
GLUCOSE BLDC GLUCOMTR-MCNC: 301 MG/DL — HIGH (ref 70–99)
GLUCOSE BLDC GLUCOMTR-MCNC: 307 MG/DL — HIGH (ref 70–99)
GLUCOSE BLDC GLUCOMTR-MCNC: 377 MG/DL — HIGH (ref 70–99)
GLUCOSE SERPL-MCNC: 210 MG/DL — HIGH (ref 70–99)
GLUCOSE SERPL-MCNC: 216 MG/DL — HIGH (ref 70–99)
GLUCOSE SERPL-MCNC: 236 MG/DL — HIGH (ref 70–99)
GLUCOSE SERPL-MCNC: 283 MG/DL — HIGH (ref 70–99)
HCT VFR BLD CALC: 27.9 % — LOW (ref 39–50)
HGB BLD-MCNC: 8.6 G/DL — LOW (ref 13–17)
IMM GRANULOCYTES NFR BLD AUTO: 3.9 % — HIGH (ref 0–0.9)
LYMPHOCYTES # BLD AUTO: 0.36 K/UL — LOW (ref 1–3.3)
LYMPHOCYTES # BLD AUTO: 4 % — LOW (ref 13–44)
MAGNESIUM SERPL-MCNC: 1.8 MG/DL — SIGNIFICANT CHANGE UP (ref 1.6–2.6)
MCHC RBC-ENTMCNC: 29.3 PG — SIGNIFICANT CHANGE UP (ref 27–34)
MCHC RBC-ENTMCNC: 30.8 G/DL — LOW (ref 32–36)
MCV RBC AUTO: 94.9 FL — SIGNIFICANT CHANGE UP (ref 80–100)
MONOCYTES # BLD AUTO: 0.39 K/UL — SIGNIFICANT CHANGE UP (ref 0–0.9)
MONOCYTES NFR BLD AUTO: 4.3 % — SIGNIFICANT CHANGE UP (ref 2–14)
NEUTROPHILS # BLD AUTO: 7.93 K/UL — HIGH (ref 1.8–7.4)
NEUTROPHILS NFR BLD AUTO: 87.3 % — HIGH (ref 43–77)
NRBC # BLD: 0 /100 WBCS — SIGNIFICANT CHANGE UP (ref 0–0)
NRBC BLD-RTO: 0 /100 WBCS — SIGNIFICANT CHANGE UP (ref 0–0)
PHOSPHATE SERPL-MCNC: 2.1 MG/DL — LOW (ref 2.5–4.5)
PLATELET # BLD AUTO: 332 K/UL — SIGNIFICANT CHANGE UP (ref 150–400)
POTASSIUM SERPL-MCNC: 5.6 MMOL/L — HIGH (ref 3.5–5.3)
POTASSIUM SERPL-MCNC: 6.1 MMOL/L — HIGH (ref 3.5–5.3)
POTASSIUM SERPL-MCNC: 6.6 MMOL/L — CRITICAL HIGH (ref 3.5–5.3)
POTASSIUM SERPL-MCNC: 6.7 MMOL/L — CRITICAL HIGH (ref 3.5–5.3)
POTASSIUM SERPL-SCNC: 5.6 MMOL/L — HIGH (ref 3.5–5.3)
POTASSIUM SERPL-SCNC: 6.1 MMOL/L — HIGH (ref 3.5–5.3)
POTASSIUM SERPL-SCNC: 6.6 MMOL/L — CRITICAL HIGH (ref 3.5–5.3)
POTASSIUM SERPL-SCNC: 6.7 MMOL/L — CRITICAL HIGH (ref 3.5–5.3)
PROT SERPL-MCNC: 5.6 G/DL — LOW (ref 6–8.3)
RBC # BLD: 2.94 M/UL — LOW (ref 4.2–5.8)
RBC # FLD: 19.8 % — HIGH (ref 10.3–14.5)
SODIUM SERPL-SCNC: 129 MMOL/L — LOW (ref 135–145)
SODIUM SERPL-SCNC: 132 MMOL/L — LOW (ref 135–145)
SODIUM SERPL-SCNC: 132 MMOL/L — LOW (ref 135–145)
SODIUM SERPL-SCNC: 133 MMOL/L — LOW (ref 135–145)
WBC # BLD: 9.08 K/UL — SIGNIFICANT CHANGE UP (ref 3.8–10.5)
WBC # FLD AUTO: 9.08 K/UL — SIGNIFICANT CHANGE UP (ref 3.8–10.5)

## 2025-02-02 PROCEDURE — 99233 SBSQ HOSP IP/OBS HIGH 50: CPT | Mod: GC

## 2025-02-02 PROCEDURE — 93010 ELECTROCARDIOGRAM REPORT: CPT

## 2025-02-02 RX ORDER — VALACYCLOVIR 1000 MG/1
1 TABLET ORAL
Qty: 0 | Refills: 0 | DISCHARGE
Start: 2025-02-02

## 2025-02-02 RX ORDER — PANTOPRAZOLE 20 MG/1
1 TABLET, DELAYED RELEASE ORAL
Qty: 0 | Refills: 0 | DISCHARGE
Start: 2025-02-02

## 2025-02-02 RX ORDER — BISACODYL 5 MG
10 TABLET, DELAYED RELEASE (ENTERIC COATED) ORAL ONCE
Refills: 0 | Status: COMPLETED | OUTPATIENT
Start: 2025-02-02 | End: 2025-02-02

## 2025-02-02 RX ORDER — BISACODYL 5 MG
10 TABLET, DELAYED RELEASE (ENTERIC COATED) ORAL ONCE
Refills: 0 | Status: DISCONTINUED | OUTPATIENT
Start: 2025-02-02 | End: 2025-02-06

## 2025-02-02 RX ORDER — ATOVAQUONE 750 MG/5ML
750 SUSPENSION ORAL EVERY 12 HOURS
Refills: 0 | Status: DISCONTINUED | OUTPATIENT
Start: 2025-02-02 | End: 2025-02-06

## 2025-02-02 RX ORDER — SODIUM ZIRCONIUM CYCLOSILICATE 5 G/5G
10 POWDER, FOR SUSPENSION ORAL EVERY 8 HOURS
Refills: 0 | Status: DISCONTINUED | OUTPATIENT
Start: 2025-02-02 | End: 2025-02-02

## 2025-02-02 RX ORDER — DM/PSEUDOEPHED/ACETAMINOPHEN 10-30-250
50 CAPSULE ORAL ONCE
Refills: 0 | Status: COMPLETED | OUTPATIENT
Start: 2025-02-02 | End: 2025-02-02

## 2025-02-02 RX ORDER — IPRATROPIUM BROMIDE AND ALBUTEROL SULFATE .5; 2.5 MG/3ML; MG/3ML
3 SOLUTION RESPIRATORY (INHALATION) ONCE
Refills: 0 | Status: COMPLETED | OUTPATIENT
Start: 2025-02-02 | End: 2025-02-02

## 2025-02-02 RX ORDER — ATOVAQUONE 750 MG/5ML
5 SUSPENSION ORAL
Qty: 0 | Refills: 0 | DISCHARGE
Start: 2025-02-02

## 2025-02-02 RX ORDER — SODIUM ZIRCONIUM CYCLOSILICATE 5 G/5G
10 POWDER, FOR SUSPENSION ORAL ONCE
Refills: 0 | Status: COMPLETED | OUTPATIENT
Start: 2025-02-02 | End: 2025-02-02

## 2025-02-02 RX ORDER — SODIUM ZIRCONIUM CYCLOSILICATE 5 G/5G
10 POWDER, FOR SUSPENSION ORAL EVERY 8 HOURS
Refills: 0 | Status: COMPLETED | OUTPATIENT
Start: 2025-02-02 | End: 2025-02-04

## 2025-02-02 RX ORDER — INSULIN LISPRO 100/ML
10 VIAL (ML) SUBCUTANEOUS
Refills: 0 | Status: DISCONTINUED | OUTPATIENT
Start: 2025-02-02 | End: 2025-02-03

## 2025-02-02 RX ADMIN — PANTOPRAZOLE 40 MILLIGRAM(S): 20 TABLET, DELAYED RELEASE ORAL at 05:12

## 2025-02-02 RX ADMIN — POLYETHYLENE GLYCOL 3350 17 GRAM(S): 17 POWDER, FOR SOLUTION ORAL at 17:49

## 2025-02-02 RX ADMIN — ANTISEPTIC SURGICAL SCRUB 1 APPLICATION(S): 0.04 SOLUTION TOPICAL at 08:48

## 2025-02-02 RX ADMIN — Medication 50 MILLILITER(S): at 19:00

## 2025-02-02 RX ADMIN — Medication 100 MILLIGRAM(S): at 05:11

## 2025-02-02 RX ADMIN — Medication 5 UNIT(S): at 19:00

## 2025-02-02 RX ADMIN — ESCITALOPRAM 10 MILLIGRAM(S): 10 TABLET, FILM COATED ORAL at 11:40

## 2025-02-02 RX ADMIN — ENOXAPARIN SODIUM 40 MILLIGRAM(S): 100 INJECTION SUBCUTANEOUS at 22:37

## 2025-02-02 RX ADMIN — Medication 50 MILLILITER(S): at 15:28

## 2025-02-02 RX ADMIN — IPRATROPIUM BROMIDE AND ALBUTEROL SULFATE 3 MILLILITER(S): .5; 2.5 SOLUTION RESPIRATORY (INHALATION) at 09:45

## 2025-02-02 RX ADMIN — Medication 4: at 14:47

## 2025-02-02 RX ADMIN — SODIUM ZIRCONIUM CYCLOSILICATE 10 GRAM(S): 5 POWDER, FOR SUSPENSION ORAL at 15:28

## 2025-02-02 RX ADMIN — Medication 10 UNIT(S): at 17:50

## 2025-02-02 RX ADMIN — IPRATROPIUM BROMIDE AND ALBUTEROL SULFATE 3 MILLILITER(S): .5; 2.5 SOLUTION RESPIRATORY (INHALATION) at 15:40

## 2025-02-02 RX ADMIN — Medication 15 GRAM(S): at 13:31

## 2025-02-02 RX ADMIN — CLONIDINE HYDROCHLORIDE 0.1 MILLIGRAM(S): 0.2 TABLET ORAL at 13:31

## 2025-02-02 RX ADMIN — CLONIDINE HYDROCHLORIDE 0.1 MILLIGRAM(S): 0.2 TABLET ORAL at 05:10

## 2025-02-02 RX ADMIN — Medication 5 UNIT(S): at 09:45

## 2025-02-02 RX ADMIN — VALACYCLOVIR 500 MILLIGRAM(S): 1000 TABLET ORAL at 18:31

## 2025-02-02 RX ADMIN — INSULIN GLARGINE-YFGN 9 UNIT(S): 100 INJECTION, SOLUTION SUBCUTANEOUS at 23:01

## 2025-02-02 RX ADMIN — BUPROPION HYDROCHLORIDE 300 MILLIGRAM(S): 150 TABLET, EXTENDED RELEASE ORAL at 11:40

## 2025-02-02 RX ADMIN — IPRATROPIUM BROMIDE AND ALBUTEROL SULFATE 3 MILLILITER(S): .5; 2.5 SOLUTION RESPIRATORY (INHALATION) at 19:00

## 2025-02-02 RX ADMIN — Medication 8: at 08:48

## 2025-02-02 RX ADMIN — SODIUM ZIRCONIUM CYCLOSILICATE 10 GRAM(S): 5 POWDER, FOR SUSPENSION ORAL at 09:45

## 2025-02-02 RX ADMIN — Medication 14 UNIT(S): at 08:47

## 2025-02-02 RX ADMIN — SULFAMETHOXAZOLE AND TRIMETHOPRIM 2 TABLET(S): 400; 80 TABLET ORAL at 05:12

## 2025-02-02 RX ADMIN — Medication 6: at 17:50

## 2025-02-02 RX ADMIN — Medication 5 UNIT(S): at 15:29

## 2025-02-02 RX ADMIN — Medication 1 DROP(S): at 05:08

## 2025-02-02 RX ADMIN — Medication 14 UNIT(S): at 14:49

## 2025-02-02 RX ADMIN — Medication 200 GRAM(S): at 10:10

## 2025-02-02 RX ADMIN — PREDNISONE 40 MILLIGRAM(S): 5 TABLET ORAL at 05:09

## 2025-02-02 RX ADMIN — Medication 15 GRAM(S): at 22:39

## 2025-02-02 RX ADMIN — Medication 1 DROP(S): at 11:40

## 2025-02-02 RX ADMIN — Medication 15 GRAM(S): at 05:09

## 2025-02-02 RX ADMIN — Medication 10 MILLIGRAM(S): at 05:09

## 2025-02-02 RX ADMIN — Medication 12.5 MILLIGRAM(S): at 05:10

## 2025-02-02 RX ADMIN — ATOVAQUONE 750 MILLIGRAM(S): 750 SUSPENSION ORAL at 17:48

## 2025-02-02 RX ADMIN — Medication 2: at 02:15

## 2025-02-02 RX ADMIN — VALACYCLOVIR 500 MILLIGRAM(S): 1000 TABLET ORAL at 05:11

## 2025-02-02 RX ADMIN — LEVOTHYROXINE SODIUM 88 MICROGRAM(S): 25 TABLET ORAL at 05:10

## 2025-02-02 RX ADMIN — Medication 1 DROP(S): at 23:05

## 2025-02-02 RX ADMIN — Medication 200 GRAM(S): at 15:29

## 2025-02-02 RX ADMIN — Medication 1 DROP(S): at 17:50

## 2025-02-02 RX ADMIN — Medication 10 MILLIGRAM(S): at 09:45

## 2025-02-02 RX ADMIN — POLYETHYLENE GLYCOL 3350 17 GRAM(S): 17 POWDER, FOR SOLUTION ORAL at 05:11

## 2025-02-02 RX ADMIN — Medication 50 MILLILITER(S): at 09:45

## 2025-02-02 RX ADMIN — SODIUM ZIRCONIUM CYCLOSILICATE 10 GRAM(S): 5 POWDER, FOR SUSPENSION ORAL at 11:40

## 2025-02-02 RX ADMIN — Medication 100 MILLIGRAM(S): at 13:31

## 2025-02-02 RX ADMIN — Medication 1 DROP(S): at 00:59

## 2025-02-02 RX ADMIN — ROSUVASTATIN CALCIUM 10 MILLIGRAM(S): 10 TABLET, FILM COATED ORAL at 22:38

## 2025-02-02 RX ADMIN — Medication 80 MILLIGRAM(S): at 15:29

## 2025-02-02 RX ADMIN — SODIUM ZIRCONIUM CYCLOSILICATE 10 GRAM(S): 5 POWDER, FOR SUSPENSION ORAL at 20:49

## 2025-02-02 NOTE — PROGRESS NOTE ADULT - ASSESSMENT
68 y/o M PMHx renal transplant 2012 (2/2 DM, s/p left nephrectomy), peripheral neuropathy, hypothyroidism, retroperitoneal fibrosis, HTN, HLD, GERD, anxiety/depression, ANGELIKA and recent admission at [facility] for sacral osteomyelitis and ESBL.coli urosepsis discharged on 12/18/24 to rehab. While admitted to [facility] was noted to have hypercalcemia and liver masses which were biopsied on 12/16/24, biopsy revealed DLBCL. Patient received R mini CHOP on 12/28 now admitted for cycle #2 R-mini CHOP, upon admission patient is febrile and chemotherapy held, course c/b AHRF and c/f PJP pneumonia

## 2025-02-02 NOTE — PROVIDER CONTACT NOTE (CRITICAL VALUE NOTIFICATION) - ACTION/TREATMENT ORDERED:
EKG; calcium gluconate 2grams; Insulin and d50; lokelma ordered and given;
Provider made aware, activate hypoglycemic protocol
1unit PRBCs
Obtain fingerstick STAT
dextrose, insulin and albuterol to be given.

## 2025-02-02 NOTE — PROGRESS NOTE ADULT - TIME BILLING
Medical management as above, reviewing chart and coordinating care with primary team/staff, as well as reviewing vitals, radiology, medication list, recent labs, and prior records.    Does not include teaching time.
Medical management as above, reviewing chart and coordinating care with primary team/staff, as well as reviewing vitals, radiology, medication list, recent labs, and prior records.    Does not include teaching time.
management of chemo side effects, cytopenias, infections, bleeding and other complications.
Preparation to see the patient including review of labs and prior notes, performing a physical exam, counseling and educating the patient, ordering medications and tests, documenting the note, and coordinating care.
management of chemo side effects, cytopenias, infections, bleeding and other complications.

## 2025-02-02 NOTE — CHART NOTE - NSCHARTNOTEFT_GEN_A_CORE
POCT Blood Glucose:  210 mg/dL (02-02-25 @ 13:42)  293 mg/dL (02-02-25 @ 11:51)  377 mg/dL (02-02-25 @ 10:41)  307 mg/dL (02-02-25 @ 09:39)  301 mg/dL (02-02-25 @ 08:33)  246 mg/dL (02-02-25 @ 06:36)  275 mg/dL (02-02-25 @ 01:48)  392 mg/dL (02-01-25 @ 21:45)      eMAR:  dextrose 50% Injectable   50 milliLiter(s) IV Push (02-02-25 @ 15:28)    dextrose 50% Injectable   50 milliLiter(s) IV Push (02-02-25 @ 09:45)    insulin glargine Injectable (LANTUS)   9 Unit(s) SubCutaneous (02-01-25 @ 21:08)    insulin lispro (ADMELOG) corrective regimen sliding scale   2 Unit(s) SubCutaneous (02-02-25 @ 02:15)   6 Unit(s) SubCutaneous (02-01-25 @ 22:09)    insulin lispro (ADMELOG) corrective regimen sliding scale   4 Unit(s) SubCutaneous (02-02-25 @ 14:47)   8 Unit(s) SubCutaneous (02-02-25 @ 08:48)   10 Unit(s) SubCutaneous (02-01-25 @ 17:43)    insulin lispro Injectable (ADMELOG)   14 Unit(s) SubCutaneous (02-02-25 @ 08:47)    insulin lispro Injectable (ADMELOG)   14 Unit(s) SubCutaneous (02-02-25 @ 14:49)    insulin lispro Injectable (ADMELOG)   8 Unit(s) SubCutaneous (02-01-25 @ 17:44)    insulin regular  human recombinant   5 Unit(s) IV Push (02-02-25 @ 09:45)    insulin regular  human recombinant   5 Unit(s) IV Push (02-02-25 @ 15:29)    levothyroxine   88 MICROGram(s) Oral (02-02-25 @ 05:10)    predniSONE   Tablet   40 milliGRAM(s) Oral (02-02-25 @ 05:09)    rosuvastatin   10 milliGRAM(s) Oral (02-01-25 @ 21:08)    Diet, Consistent Carbohydrate/No Snacks:   Supplement Feeding Modality:  Oral  Glucerna Shake Cans or Servings Per Day:  1       Frequency:  Daily (01-26-25 @ 23:00) [Active]      Chart reviewed.   Last 24 hour BGs 200s-392 with fasting  and prandial BGs in 200s today  No hypoglycemia noted   Noted pt received Dextrose 50% with Regular insulin 5 units X2 for hyperkalemia treatment, likely attributing to BGs in 200s today   Completed 5 days of Prednisone 40 mg daily, dose will be decreased to 20 mg daily tomorrow    - Will continue current dose insulin ( Lantus 9 units at HS and Admelog 14-14-10 units with meals )   - insulin dose likely need to be decreased tomorrow based on BGs         Contact via Microsoft Teams during business hours  To reach covering provider access AMION via sunrise tools  For Urgent matters/after-hours/weekends/holidays please page endocrine fellow on call   For nonurgent matters please email [email or web address]    Please note that this patient may be followed by different provider tomorrow.  Notify endocrine 24 hours prior to discharge for final recommendations

## 2025-02-02 NOTE — PROVIDER CONTACT NOTE (CRITICAL VALUE NOTIFICATION) - BACKGROUND
pt. admitted for DLBCL
Pt admitted w post transplant lymphoproliferative disorder
pt. admitted for DLBCL
see H&p

## 2025-02-02 NOTE — PROGRESS NOTE ADULT - SUBJECTIVE AND OBJECTIVE BOX
ISLAND INFECTIOUS DISEASE  WANG Mccoy Y. Patel, S. Shah, G. Casimir  [phone]  ([phone] - weekdays after 5pm and weekends)    Name: JAN CALVIN  Age/Gender: 69y Male  MRN: [id number]    Interval History:  Patient seen and examined this morning.   No new complaints noted.  Notes reviewed  No concerning overnight events  Afebrile   Allergies: No Known Allergies      Objective:  Vitals:   T(F): 98 (02-02-25 @ 17:01), Max: 98.9 (02-02-25 @ 09:40)  HR: 68 (02-02-25 @ 17:01) (63 - 74)  BP: 112/58 (02-02-25 @ 17:01) (108/53 - 165/68)  RR: 20 (02-02-25 @ 17:01) (18 - 20)  SpO2: 96% (02-02-25 @ 17:01) (96% - 98%)  Physical Examination:  General: no acute distress, NC 3L  HEENT: normocephalic, atraumatic, anicteric  Respiratory: no acc muscle use, breathing comfortably  Cardiovascular: S1 and S2 present  Gastrointestinal: normal appearing, nondistended  Extremities: no edema, no cyanosis  Skin: no visible rash    Laboratory Studies:  CBC:                       8.6    9.08  )-----------( 332      ( 02 Feb 2025 08:38 )             27.9     WBC Trend:  9.08 02-02-25 @ 08:38  8.99 02-01-25 @ 06:55  13.05 01-31-25 @ 07:10  12.21 01-30-25 @ 07:05  10.78 01-29-25 @ 05:02  10.57 01-28-25 @ 07:36  8.17 01-27-25 @ 06:58    CMP: 02-02    132[L]  |  99  |  32[H]  ----------------------------<  283[H]  6.1[H]   |  24  |  0.86    Ca    10.8[H]      02 Feb 2025 17:55  Phos  2.1     02-02  Mg     1.8     02-02    TPro  5.6[L]  /  Alb  2.7[L]  /  TBili  0.2  /  DBili  x   /  AST  24  /  ALT  22  /  AlkPhos  85  02-02    Creatinine: 0.86 mg/dL (02-02-25 @ 17:55)  Creatinine: 0.75 mg/dL (02-02-25 @ 13:39)  Creatinine: 0.80 mg/dL (02-02-25 @ 08:38)  Creatinine: 0.84 mg/dL (02-01-25 @ 06:54)  Creatinine: 0.98 mg/dL (01-31-25 @ 07:11)  Creatinine: 0.95 mg/dL (01-30-25 @ 07:06)  Creatinine: 1.05 mg/dL (01-29-25 @ 05:02)  Creatinine: 1.12 mg/dL (01-28-25 @ 07:34)  Creatinine: 0.87 mg/dL (01-27-25 @ 07:00)      LIVER FUNCTIONS - ( 02 Feb 2025 08:38 )  Alb: 2.7 g/dL / Pro: 5.6 g/dL / ALK PHOS: 85 U/L / ALT: 22 U/L / AST: 24 U/L / GGT: x           Microbiology: reviewed   Culture - Blood (collected 01-24-25 @ 00:46)  Source: .Blood BLOOD  Final Report (01-29-25 @ 04:00):    No growth at 5 days    Culture - Blood (collected 01-24-25 @ 00:18)  Source: .Blood BLOOD  Final Report (01-29-25 @ 04:00):    No growth at 5 days    Culture - Urine (collected 01-22-25 @ 05:05)  Source: Clean Catch Clean Catch (Midstream)  Final Report (01-24-25 @ 13:31):    10,000 - 49,000 CFU/mL Candida albicans    "Susceptibilities not performed"    Culture - Blood (collected 01-21-25 @ 22:25)  Source: .Blood BLOOD  Final Report (01-27-25 @ 03:00):    No growth at 5 days    Culture - Blood (collected 01-21-25 @ 22:07)  Source: .Blood BLOOD  Final Report (01-27-25 @ 03:00):    No growth at 5 days    Radiology: reviewed     Medications:  acetaminophen     Tablet .. 650 milliGRAM(s) Oral every 6 hours PRN  amLODIPine   Tablet 10 milliGRAM(s) Oral daily  artificial tears (preservative free) Ophthalmic Solution 1 Drop(s) Both EYES every 6 hours  atovaquone  Suspension 750 milliGRAM(s) Oral every 12 hours  bisacodyl Suppository 10 milliGRAM(s) Rectal once PRN  buPROPion XL (24-Hour) . 300 milliGRAM(s) Oral daily  carvedilol 12.5 milliGRAM(s) Oral every 12 hours  chlorhexidine 4% Liquid 1 Application(s) Topical <User Schedule>  cloNIDine 0.1 milliGRAM(s) Oral three times a day  dextrose 5%. 1000 milliLiter(s) IV Continuous <Continuous>  dextrose 5%. 1000 milliLiter(s) IV Continuous <Continuous>  dextrose 50% Injectable 25 Gram(s) IV Push once  dextrose Oral Gel 15 Gram(s) Oral once PRN  enoxaparin Injectable 40 milliGRAM(s) SubCutaneous <User Schedule>  escitalopram 10 milliGRAM(s) Oral daily  glucagon  Injectable 1 milliGRAM(s) IntraMuscular once  hydrALAZINE 100 milliGRAM(s) Oral every 8 hours  insulin glargine Injectable (LANTUS) 9 Unit(s) SubCutaneous at bedtime  insulin lispro (ADMELOG) corrective regimen sliding scale   SubCutaneous three times a day before meals  insulin lispro (ADMELOG) corrective regimen sliding scale   SubCutaneous <User Schedule>  insulin lispro Injectable (ADMELOG) 14 Unit(s) SubCutaneous before breakfast  insulin lispro Injectable (ADMELOG) 14 Unit(s) SubCutaneous before lunch  insulin lispro Injectable (ADMELOG) 10 Unit(s) SubCutaneous with dinner  lactulose Syrup 15 Gram(s) Oral every 8 hours  levothyroxine 88 MICROGram(s) Oral daily  pantoprazole    Tablet 40 milliGRAM(s) Oral before breakfast  polyethylene glycol 3350 17 Gram(s) Oral every 12 hours  rosuvastatin 10 milliGRAM(s) Oral at bedtime  senna 2 Tablet(s) Oral at bedtime  sodium chloride 0.9% lock flush 10 milliLiter(s) IV Push every 1 hour PRN  sodium zirconium cyclosilicate 10 Gram(s) Oral every 8 hours  valACYclovir 500 milliGRAM(s) Oral every 12 hours    Current Antimicrobials:  atovaquone  Suspension 750 milliGRAM(s) Oral every 12 hours  valACYclovir 500 milliGRAM(s) Oral every 12 hours    Prior/Completed Antimicrobials:  ertapenem  IVPB  piperacillin/tazobactam IVPB.  piperacillin/tazobactam IVPB.-  vancomycin  IVPB

## 2025-02-02 NOTE — PROGRESS NOTE ADULT - ASSESSMENT
Patient is a 69 year old male with PMH of renal transplant 2012 (2/2 DM, s/p left nephrectomy), peripheral neuropathy, hypothyroidism, retroperitoneal fibrosis, HTN, HLD, GERD, anxiety/depression, ANGELIKA and recent admission at [facility] for ESBL E.coli urosepsis, imaging with ?sacral OM though pt with no sacral wound suspected findings likely related to mets, he was discharged on 12/18/24 to rehab, recently diagnosed DLBCL while admitted to [place] when he was noted to have hypercalcemia and liver masses s/p biopsy 12/16/24, now s/p R mini CHOP on 12/28 who was admitted 1/17 for cycle #2 Rmini CHOP, upon admission patient febrile and chemotherapy was held for now.  Prior cultures reviewed, history of ESBL Klebsiella in Ucx 12/31/24, ESBL E.coli 11/29/24 Ucx     Viral URI d/t coronavirus (not COVID), treated for possible bacterial pneumonia  Persistent fevers, worsening hypoxia likely due to PJP   - 1/17 RVP with Coronavirus (229E,HKU1,NL63,OC43) detected, repeat 1/28 also positive -likely ongoing viral shedding   - 1/17 CT Chest with extensive new b/l ground glass nodular opacities, upper lobe dominant - possible pneumonia   - 1/17 CTAP decreased R hepatic mass since 12/9/24; known splenic mass; changes likely c/w retroperitoneal fibrosis   - s/p zosyn 1/17-1/20, escalated to ertapenem 1/20pm d/t fevers but fevers continued with worsening hypoxia   - 1/23 CTAP with no significant changes, diffuse erosive changes in sacrum with soft tissue infiltration similar to prior, low suspicion for OM given no open wound in area  - 1/23 CT Chest with diffuse b/l patchy ggo with central predominance increased from 1/17/25 - edema vs pneumonia; unchanged LLL round atelectasis; small L pleural effusion   -- c/f PCP based on above repeat CT, ongoing fevers, worsening hypoxia now requiring HFNC, LDH increased, and h/o of being on steroids without ppx and iso malignancy, noted vancomycin added overnight, s/p hydrocortisone 1/22-1/23   - IP eval appreciated, pt prefers to avoid bronch d/t risk of difficulty weaning from vent  - 1/17, 1/19, 1/21, 1/24 Bcx remain NGTD  - 1/24 Aspergillus ag negative   1/25 ua negative, EBV serology c/w past infection, CMV PCR negative    1/26 fungitell > 500 - c/w suspicion for PJP  1/29 afebrile >48h now, WBC stable, weaned off HFNC-now on NC, no cough  1/31 WBC trend noted-likely reactive, now on NC 2L, afebrile, overall much improved   2/1 WBC normalized, on NC 2L  2/2 noted with hyperkalemia likely d/t bactrim, remains elevated today    s/p zosyn 1/17-1/20  s/p ertapenem 1/20- 1/24  s/p vancomycin x1 on 1/24  s/p meropenem 1/24-1/26   s/p IV bactrim 1/24-1/31    Recommendations:   Discontinued bactrim today  Started on Atovaquone 750mg PO Q12h to complete total 21d course (including bactrim) on 2/13  -then can look at start on atovaquone 1500mg daily for ppx   Continue Prednisone 40mg PO daily x5d (1/29-2/2) - from tomorrow 20mg daily for 11 days (2/3-2/13)  Nephrology following, monitor renal function/K closely    Continue on valtrex ppx  Monitor temps/CBC  Aspiration precautions  Wound care, offloading as able, nutrition  Continue rest of care per primary team     D/w Dr. Earl Ac M.D.  [facility]  Available on [employer] TEAMS - *PREFERRED*  [phone]  After 5pm on weekdays and all day on weekends - please call [phone]     Thank you for consulting us and involving us in the management of this patients case. In addition to reviewing history, imaging, documents, labs, microbiology, took into account antibiotic stewardship, local antibiogram and infection control strategies and potential transmission issues.

## 2025-02-02 NOTE — PROVIDER CONTACT NOTE (CRITICAL VALUE NOTIFICATION) - ASSESSMENT
pt denies chest pain ot shortness of  breath
Pt lethargic but rousable, oriented to self and place per baseline
Pt. a & o x 3, no s/s of active bleeding
VSS and pt denies chest pain
Pt A&Ox3, lethargic

## 2025-02-02 NOTE — PROGRESS NOTE ADULT - SUBJECTIVE AND OBJECTIVE BOX
Rod Elliott, PGY-2  Please contact on Teams    JAN CALVIN  69y  Male    Reason for Admission:     SUBJECTIVE/INTERVAL EVENTS: No acute events. Pt seen and examined at bedside.    Vital Signs Last 24 Hrs  T(C): 36.3 (02 Feb 2025 05:07), Max: 37 (01 Feb 2025 21:07)  T(F): 97.3 (02 Feb 2025 05:07), Max: 98.6 (01 Feb 2025 21:07)  HR: 64 (02 Feb 2025 05:07) (64 - 79)  BP: 137/58 (02 Feb 2025 05:07) (137/58 - 165/68)  BP(mean): --  RR: 18 (02 Feb 2025 05:07) (17 - 18)  SpO2: 98% (02 Feb 2025 05:07) (94% - 98%)    Parameters below as of 02 Feb 2025 05:07  Patient On (Oxygen Delivery Method): nasal cannula        Physical Exam:   General: NAD  HEENT: PERRL, normal sclera/conjunctiva  Neck: Supple  Lungs: crackles bilaterally  Heart: RRR, normal S1S2, no murmurs/rubs/gallops  Abdomen: Soft, ND/NT  Extremities: no edema  Skin: Warm, well-perfused  Neuro: A&O x3, no focal deficits    Consultant(s) Notes Reviewed:  [x] YES  [ ] NO  Care Discussed with Consultants/Other Providers [x] YES  [ ] NO    LABS:                        7.8    8.99  )-----------( 361      ( 01 Feb 2025 06:55 )             24.9                         9.3    13.05 )-----------( 498      ( 31 Jan 2025 07:10 )             29.1         Urinalysis Basic - ( 01 Feb 2025 06:54 )    Color: x / Appearance: x / SG: x / pH: x  Gluc: 193 mg/dL / Ketone: x  / Bili: x / Urobili: x   Blood: x / Protein: x / Nitrite: x   Leuk Esterase: x / RBC: x / WBC x   Sq Epi: x / Non Sq Epi: x / Bacteria: x        RADIOLOGY & ADDITIONAL TESTS:    Imaging Personally Reviewed:  [x] YES  [ ] NO    MEDICATIONS  (STANDING):  amLODIPine   Tablet 10 milliGRAM(s) Oral daily  artificial tears (preservative free) Ophthalmic Solution 1 Drop(s) Both EYES every 6 hours  buPROPion XL (24-Hour) . 300 milliGRAM(s) Oral daily  carvedilol 12.5 milliGRAM(s) Oral every 12 hours  chlorhexidine 4% Liquid 1 Application(s) Topical <User Schedule>  cloNIDine 0.1 milliGRAM(s) Oral three times a day  dextrose 5%. 1000 milliLiter(s) (100 mL/Hr) IV Continuous <Continuous>  dextrose 5%. 1000 milliLiter(s) (50 mL/Hr) IV Continuous <Continuous>  dextrose 50% Injectable 25 Gram(s) IV Push once  enoxaparin Injectable 40 milliGRAM(s) SubCutaneous <User Schedule>  escitalopram 10 milliGRAM(s) Oral daily  glucagon  Injectable 1 milliGRAM(s) IntraMuscular once  hydrALAZINE 100 milliGRAM(s) Oral every 8 hours  insulin glargine Injectable (LANTUS) 9 Unit(s) SubCutaneous at bedtime  insulin lispro (ADMELOG) corrective regimen sliding scale   SubCutaneous <User Schedule>  insulin lispro (ADMELOG) corrective regimen sliding scale   SubCutaneous three times a day before meals  insulin lispro Injectable (ADMELOG) 14 Unit(s) SubCutaneous before breakfast  insulin lispro Injectable (ADMELOG) 14 Unit(s) SubCutaneous before lunch  insulin lispro Injectable (ADMELOG) 8 Unit(s) SubCutaneous with dinner  lactulose Syrup 15 Gram(s) Oral every 8 hours  levothyroxine 88 MICROGram(s) Oral daily  pantoprazole    Tablet 40 milliGRAM(s) Oral before breakfast  polyethylene glycol 3350 17 Gram(s) Oral every 12 hours  rosuvastatin 10 milliGRAM(s) Oral at bedtime  senna 2 Tablet(s) Oral at bedtime  sodium zirconium cyclosilicate 10 Gram(s) Oral daily  trimethoprim  160 mG/sulfamethoxazole 800 mG 2 Tablet(s) Oral every 8 hours  valACYclovir 500 milliGRAM(s) Oral every 12 hours    MEDICATIONS  (PRN):  acetaminophen     Tablet .. 650 milliGRAM(s) Oral every 6 hours PRN Temp greater or equal to 38C (100.4F), Mild Pain (1 - 3)  dextrose Oral Gel 15 Gram(s) Oral once PRN Blood Glucose LESS THAN 70 milliGRAM(s)/deciliter  sodium chloride 0.9% lock flush 10 milliLiter(s) IV Push every 1 hour PRN Pre/post blood products, medications, blood draw, and to maintain line patency      HEALTH ISSUES - PROBLEM Dx:  DLBCL (diffuse large B cell lymphoma)    Diabetes mellitus, type 2    Hypothyroidism    Kidney transplanted    Hypertension    Prophylactic measure    Major depression    Hyperlipidemia    Sacral decubitus ulcer    Chronic GERD    Steroid-induced hyperglycemia    López Agitation Sedation Scale (RASS) score minus 3, moderate sedation    Agitation    Acute hypoxemic respiratory failure    Type 2 diabetes mellitus with hyperglycemia, with long-term current use of insulin

## 2025-02-03 LAB
ALBUMIN SERPL ELPH-MCNC: 2.7 G/DL — LOW (ref 3.3–5)
ALP SERPL-CCNC: 86 U/L — SIGNIFICANT CHANGE UP (ref 40–120)
ALT FLD-CCNC: 24 U/L — SIGNIFICANT CHANGE UP (ref 10–45)
ANION GAP SERPL CALC-SCNC: 10 MMOL/L — SIGNIFICANT CHANGE UP (ref 5–17)
AST SERPL-CCNC: 25 U/L — SIGNIFICANT CHANGE UP (ref 10–40)
BASOPHILS # BLD AUTO: 0.02 K/UL — SIGNIFICANT CHANGE UP (ref 0–0.2)
BASOPHILS NFR BLD AUTO: 0.2 % — SIGNIFICANT CHANGE UP (ref 0–2)
BILIRUB SERPL-MCNC: 0.3 MG/DL — SIGNIFICANT CHANGE UP (ref 0.2–1.2)
BUN SERPL-MCNC: 30 MG/DL — HIGH (ref 7–23)
CALCIUM SERPL-MCNC: 10.4 MG/DL — SIGNIFICANT CHANGE UP (ref 8.4–10.5)
CHLORIDE SERPL-SCNC: 99 MMOL/L — SIGNIFICANT CHANGE UP (ref 96–108)
CO2 SERPL-SCNC: 26 MMOL/L — SIGNIFICANT CHANGE UP (ref 22–31)
CREAT SERPL-MCNC: 0.85 MG/DL — SIGNIFICANT CHANGE UP (ref 0.5–1.3)
EGFR: 95 ML/MIN/1.73M2 — SIGNIFICANT CHANGE UP
EOSINOPHIL # BLD AUTO: 0.16 K/UL — SIGNIFICANT CHANGE UP (ref 0–0.5)
EOSINOPHIL NFR BLD AUTO: 1.7 % — SIGNIFICANT CHANGE UP (ref 0–6)
GLUCOSE BLDC GLUCOMTR-MCNC: 110 MG/DL — HIGH (ref 70–99)
GLUCOSE BLDC GLUCOMTR-MCNC: 142 MG/DL — HIGH (ref 70–99)
GLUCOSE BLDC GLUCOMTR-MCNC: 159 MG/DL — HIGH (ref 70–99)
GLUCOSE BLDC GLUCOMTR-MCNC: 203 MG/DL — HIGH (ref 70–99)
GLUCOSE BLDC GLUCOMTR-MCNC: 391 MG/DL — HIGH (ref 70–99)
GLUCOSE SERPL-MCNC: 99 MG/DL — SIGNIFICANT CHANGE UP (ref 70–99)
HCT VFR BLD CALC: 27.6 % — LOW (ref 39–50)
HGB BLD-MCNC: 8.6 G/DL — LOW (ref 13–17)
IMM GRANULOCYTES NFR BLD AUTO: 3.6 % — HIGH (ref 0–0.9)
LYMPHOCYTES # BLD AUTO: 0.42 K/UL — LOW (ref 1–3.3)
LYMPHOCYTES # BLD AUTO: 4.5 % — LOW (ref 13–44)
MAGNESIUM SERPL-MCNC: 1.7 MG/DL — SIGNIFICANT CHANGE UP (ref 1.6–2.6)
MCHC RBC-ENTMCNC: 29.7 PG — SIGNIFICANT CHANGE UP (ref 27–34)
MCHC RBC-ENTMCNC: 31.2 G/DL — LOW (ref 32–36)
MCV RBC AUTO: 95.2 FL — SIGNIFICANT CHANGE UP (ref 80–100)
MONOCYTES # BLD AUTO: 0.46 K/UL — SIGNIFICANT CHANGE UP (ref 0–0.9)
MONOCYTES NFR BLD AUTO: 5 % — SIGNIFICANT CHANGE UP (ref 2–14)
NEUTROPHILS # BLD AUTO: 7.88 K/UL — HIGH (ref 1.8–7.4)
NEUTROPHILS NFR BLD AUTO: 85 % — HIGH (ref 43–77)
NRBC # BLD: 0 /100 WBCS — SIGNIFICANT CHANGE UP (ref 0–0)
NRBC BLD-RTO: 0 /100 WBCS — SIGNIFICANT CHANGE UP (ref 0–0)
PHOSPHATE SERPL-MCNC: 2.6 MG/DL — SIGNIFICANT CHANGE UP (ref 2.5–4.5)
PLATELET # BLD AUTO: 296 K/UL — SIGNIFICANT CHANGE UP (ref 150–400)
POTASSIUM SERPL-MCNC: 5.1 MMOL/L — SIGNIFICANT CHANGE UP (ref 3.5–5.3)
POTASSIUM SERPL-SCNC: 5.1 MMOL/L — SIGNIFICANT CHANGE UP (ref 3.5–5.3)
PROT SERPL-MCNC: 5.7 G/DL — LOW (ref 6–8.3)
RBC # BLD: 2.9 M/UL — LOW (ref 4.2–5.8)
RBC # FLD: 19.9 % — HIGH (ref 10.3–14.5)
SODIUM SERPL-SCNC: 135 MMOL/L — SIGNIFICANT CHANGE UP (ref 135–145)
WBC # BLD: 9.27 K/UL — SIGNIFICANT CHANGE UP (ref 3.8–10.5)
WBC # FLD AUTO: 9.27 K/UL — SIGNIFICANT CHANGE UP (ref 3.8–10.5)

## 2025-02-03 PROCEDURE — 99232 SBSQ HOSP IP/OBS MODERATE 35: CPT | Mod: GC

## 2025-02-03 PROCEDURE — 99232 SBSQ HOSP IP/OBS MODERATE 35: CPT

## 2025-02-03 RX ORDER — INSULIN LISPRO 100/ML
14 VIAL (ML) SUBCUTANEOUS
Refills: 0 | Status: DISCONTINUED | OUTPATIENT
Start: 2025-02-04 | End: 2025-02-05

## 2025-02-03 RX ORDER — INSULIN LISPRO 100/ML
8 VIAL (ML) SUBCUTANEOUS
Refills: 0 | Status: DISCONTINUED | OUTPATIENT
Start: 2025-02-03 | End: 2025-02-03

## 2025-02-03 RX ORDER — INSULIN LISPRO 100/ML
10 VIAL (ML) SUBCUTANEOUS
Refills: 0 | Status: DISCONTINUED | OUTPATIENT
Start: 2025-02-03 | End: 2025-02-05

## 2025-02-03 RX ORDER — INSULIN LISPRO 100/ML
12 VIAL (ML) SUBCUTANEOUS
Refills: 0 | Status: DISCONTINUED | OUTPATIENT
Start: 2025-02-03 | End: 2025-02-03

## 2025-02-03 RX ORDER — INSULIN GLARGINE-YFGN 100 [IU]/ML
6 INJECTION, SOLUTION SUBCUTANEOUS AT BEDTIME
Refills: 0 | Status: DISCONTINUED | OUTPATIENT
Start: 2025-02-03 | End: 2025-02-05

## 2025-02-03 RX ADMIN — VALACYCLOVIR 500 MILLIGRAM(S): 1000 TABLET ORAL at 17:33

## 2025-02-03 RX ADMIN — Medication 15 GRAM(S): at 22:11

## 2025-02-03 RX ADMIN — ESCITALOPRAM 10 MILLIGRAM(S): 10 TABLET, FILM COATED ORAL at 12:43

## 2025-02-03 RX ADMIN — INSULIN GLARGINE-YFGN 6 UNIT(S): 100 INJECTION, SOLUTION SUBCUTANEOUS at 22:11

## 2025-02-03 RX ADMIN — Medication 12.5 MILLIGRAM(S): at 17:31

## 2025-02-03 RX ADMIN — Medication 6: at 22:12

## 2025-02-03 RX ADMIN — SODIUM ZIRCONIUM CYCLOSILICATE 10 GRAM(S): 5 POWDER, FOR SUSPENSION ORAL at 19:59

## 2025-02-03 RX ADMIN — Medication 12 UNIT(S): at 12:44

## 2025-02-03 RX ADMIN — Medication 10 UNIT(S): at 17:32

## 2025-02-03 RX ADMIN — Medication 1 DROP(S): at 06:23

## 2025-02-03 RX ADMIN — Medication 1 DROP(S): at 17:32

## 2025-02-03 RX ADMIN — POLYETHYLENE GLYCOL 3350 17 GRAM(S): 17 POWDER, FOR SOLUTION ORAL at 17:31

## 2025-02-03 RX ADMIN — PREDNISONE 20 MILLIGRAM(S): 5 TABLET ORAL at 06:21

## 2025-02-03 RX ADMIN — SODIUM ZIRCONIUM CYCLOSILICATE 10 GRAM(S): 5 POWDER, FOR SUSPENSION ORAL at 04:17

## 2025-02-03 RX ADMIN — Medication 15 GRAM(S): at 06:22

## 2025-02-03 RX ADMIN — Medication 1 DROP(S): at 12:43

## 2025-02-03 RX ADMIN — Medication 14 UNIT(S): at 09:26

## 2025-02-03 RX ADMIN — ATOVAQUONE 750 MILLIGRAM(S): 750 SUSPENSION ORAL at 06:21

## 2025-02-03 RX ADMIN — ROSUVASTATIN CALCIUM 10 MILLIGRAM(S): 10 TABLET, FILM COATED ORAL at 22:11

## 2025-02-03 RX ADMIN — PANTOPRAZOLE 40 MILLIGRAM(S): 20 TABLET, DELAYED RELEASE ORAL at 06:22

## 2025-02-03 RX ADMIN — Medication 4: at 17:32

## 2025-02-03 RX ADMIN — Medication 2: at 12:43

## 2025-02-03 RX ADMIN — ATOVAQUONE 750 MILLIGRAM(S): 750 SUSPENSION ORAL at 17:31

## 2025-02-03 RX ADMIN — Medication 15 GRAM(S): at 12:43

## 2025-02-03 RX ADMIN — BUPROPION HYDROCHLORIDE 300 MILLIGRAM(S): 150 TABLET, EXTENDED RELEASE ORAL at 17:32

## 2025-02-03 RX ADMIN — VALACYCLOVIR 500 MILLIGRAM(S): 1000 TABLET ORAL at 06:22

## 2025-02-03 RX ADMIN — ENOXAPARIN SODIUM 40 MILLIGRAM(S): 100 INJECTION SUBCUTANEOUS at 22:12

## 2025-02-03 RX ADMIN — LEVOTHYROXINE SODIUM 88 MICROGRAM(S): 25 TABLET ORAL at 06:22

## 2025-02-03 RX ADMIN — Medication 1 DROP(S): at 22:26

## 2025-02-03 RX ADMIN — SODIUM ZIRCONIUM CYCLOSILICATE 10 GRAM(S): 5 POWDER, FOR SUSPENSION ORAL at 12:43

## 2025-02-03 RX ADMIN — ANTISEPTIC SURGICAL SCRUB 1 APPLICATION(S): 0.04 SOLUTION TOPICAL at 09:26

## 2025-02-03 NOTE — PROGRESS NOTE ADULT - SUBJECTIVE AND OBJECTIVE BOX
Patient is a 69y old  Male who presents with a chief complaint of "For chemo" (02 Feb 2025 08:30)      SUBJECTIVE / OVERNIGHT EVENTS: Patient seen and examined at bedside. No acute events overnight. Pt denies fevers, chills, chest pain, abdominal pain, n/v/d.    MEDICATIONS  (STANDING):  amLODIPine   Tablet 10 milliGRAM(s) Oral daily  artificial tears (preservative free) Ophthalmic Solution 1 Drop(s) Both EYES every 6 hours  atovaquone  Suspension 750 milliGRAM(s) Oral every 12 hours  buPROPion XL (24-Hour) . 300 milliGRAM(s) Oral daily  carvedilol 12.5 milliGRAM(s) Oral every 12 hours  chlorhexidine 4% Liquid 1 Application(s) Topical <User Schedule>  cloNIDine 0.1 milliGRAM(s) Oral three times a day  dextrose 5%. 1000 milliLiter(s) (50 mL/Hr) IV Continuous <Continuous>  dextrose 5%. 1000 milliLiter(s) (100 mL/Hr) IV Continuous <Continuous>  dextrose 50% Injectable 25 Gram(s) IV Push once  enoxaparin Injectable 40 milliGRAM(s) SubCutaneous <User Schedule>  escitalopram 10 milliGRAM(s) Oral daily  glucagon  Injectable 1 milliGRAM(s) IntraMuscular once  hydrALAZINE 100 milliGRAM(s) Oral every 8 hours  insulin glargine Injectable (LANTUS) 9 Unit(s) SubCutaneous at bedtime  insulin lispro (ADMELOG) corrective regimen sliding scale   SubCutaneous <User Schedule>  insulin lispro (ADMELOG) corrective regimen sliding scale   SubCutaneous three times a day before meals  insulin lispro Injectable (ADMELOG) 14 Unit(s) SubCutaneous before breakfast  insulin lispro Injectable (ADMELOG) 14 Unit(s) SubCutaneous before lunch  insulin lispro Injectable (ADMELOG) 10 Unit(s) SubCutaneous with dinner  lactulose Syrup 15 Gram(s) Oral every 8 hours  levothyroxine 88 MICROGram(s) Oral daily  pantoprazole    Tablet 40 milliGRAM(s) Oral before breakfast  polyethylene glycol 3350 17 Gram(s) Oral every 12 hours  predniSONE   Tablet 20 milliGRAM(s) Oral daily  rosuvastatin 10 milliGRAM(s) Oral at bedtime  senna 2 Tablet(s) Oral at bedtime  sodium zirconium cyclosilicate 10 Gram(s) Oral every 8 hours  valACYclovir 500 milliGRAM(s) Oral every 12 hours    MEDICATIONS  (PRN):  acetaminophen     Tablet .. 650 milliGRAM(s) Oral every 6 hours PRN Temp greater or equal to 38C (100.4F), Mild Pain (1 - 3)  bisacodyl Suppository 10 milliGRAM(s) Rectal once PRN Constipation  dextrose Oral Gel 15 Gram(s) Oral once PRN Blood Glucose LESS THAN 70 milliGRAM(s)/deciliter  sodium chloride 0.9% lock flush 10 milliLiter(s) IV Push every 1 hour PRN Pre/post blood products, medications, blood draw, and to maintain line patency      Vital Signs Last 24 Hrs  T(C): 36.7 (03 Feb 2025 04:45), Max: 37.2 (02 Feb 2025 09:40)  T(F): 98 (03 Feb 2025 04:45), Max: 98.9 (02 Feb 2025 09:40)  HR: 62 (03 Feb 2025 04:45) (62 - 74)  BP: 118/61 (03 Feb 2025 04:45) (108/53 - 147/68)  BP(mean): --  RR: 20 (03 Feb 2025 04:45) (18 - 20)  SpO2: 98% (03 Feb 2025 04:45) (96% - 98%)    Parameters below as of 03 Feb 2025 04:45  Patient On (Oxygen Delivery Method): nasal cannula  O2 Flow (L/min): 2    CAPILLARY BLOOD GLUCOSE      POCT Blood Glucose.: 110 mg/dL (03 Feb 2025 03:18)  POCT Blood Glucose.: 159 mg/dL (02 Feb 2025 22:50)  POCT Blood Glucose.: 213 mg/dL (02 Feb 2025 21:03)  POCT Blood Glucose.: 279 mg/dL (02 Feb 2025 20:11)  POCT Blood Glucose.: 253 mg/dL (02 Feb 2025 18:43)  POCT Blood Glucose.: 298 mg/dL (02 Feb 2025 17:13)  POCT Blood Glucose.: 210 mg/dL (02 Feb 2025 13:42)  POCT Blood Glucose.: 293 mg/dL (02 Feb 2025 11:51)  POCT Blood Glucose.: 377 mg/dL (02 Feb 2025 10:41)  POCT Blood Glucose.: 307 mg/dL (02 Feb 2025 09:39)  POCT Blood Glucose.: 301 mg/dL (02 Feb 2025 08:33)    I&O's Summary    02 Feb 2025 07:01  -  03 Feb 2025 07:00  --------------------------------------------------------  IN: 1380 mL / OUT: 4620 mL / NET: -3240 mL        PHYSICAL EXAM:  HEENT: PERRL, normal sclera/conjunctiva  Neck: Supple  Lungs: crackles bilaterally  Heart: RRR, normal S1S2, no murmurs/rubs/gallops  Abdomen: Soft, ND/NT  Extremities: no edema  Skin: Warm, well-perfused  Neuro: A&O x3, no focal deficits    LABS:                        8.6    9.08  )-----------( 332      ( 02 Feb 2025 08:38 )             27.9     02-02    132[L]  |  99  |  33[H]  ----------------------------<  210[H]  5.6[H]   |  25  |  0.92    Ca    10.6[H]      02 Feb 2025 22:19  Phos  2.1     02-02  Mg     1.8     02-02    TPro  5.6[L]  /  Alb  2.7[L]  /  TBili  0.2  /  DBili  x   /  AST  24  /  ALT  22  /  AlkPhos  85  02-02          Urinalysis Basic - ( 02 Feb 2025 22:19 )    Color: x / Appearance: x / SG: x / pH: x  Gluc: 210 mg/dL / Ketone: x  / Bili: x / Urobili: x   Blood: x / Protein: x / Nitrite: x   Leuk Esterase: x / RBC: x / WBC x   Sq Epi: x / Non Sq Epi: x / Bacteria: x        RADIOLOGY & ADDITIONAL TESTS:    Imaging Personally Reviewed:    Consultant(s) Notes Reviewed:      Care Discussed with Consultants/Other Providers:    Nathalie Brunner MD, Internal Medicine Resident

## 2025-02-03 NOTE — PROGRESS NOTE ADULT - ASSESSMENT
Patient is a 69 year old male with PMH of renal transplant 2012 (2/2 DM, s/p left nephrectomy), peripheral neuropathy, hypothyroidism, retroperitoneal fibrosis, HTN, HLD, GERD, anxiety/depression, ANGELIKA and recent admission at [facility] for ESBL E.coli urosepsis, imaging with ?sacral OM though pt with no sacral wound suspected findings likely related to mets, he was discharged on 12/18/24 to rehab, recently diagnosed DLBCL while admitted to [place] when he was noted to have hypercalcemia and liver masses s/p biopsy 12/16/24, now s/p R mini CHOP on 12/28 who was admitted 1/17 for cycle #2 Rmini CHOP, upon admission patient febrile and chemotherapy was held for now.  Prior cultures reviewed, history of ESBL Klebsiella in Ucx 12/31/24, ESBL E.coli 11/29/24 Ucx     Viral URI d/t coronavirus (not COVID), treated for possible bacterial pneumonia  Persistent fevers, worsening hypoxia likely due to PJP   - 1/17 RVP with Coronavirus (229E,HKU1,NL63,OC43) detected, repeat 1/28 also positive -likely ongoing viral shedding   - 1/17 CT Chest with extensive new b/l ground glass nodular opacities, upper lobe dominant - possible pneumonia   - 1/17 CTAP decreased R hepatic mass since 12/9/24; known splenic mass; changes likely c/w retroperitoneal fibrosis   - s/p zosyn 1/17-1/20, escalated to ertapenem 1/20pm d/t fevers but fevers continued with worsening hypoxia   - 1/23 CTAP with no significant changes, diffuse erosive changes in sacrum with soft tissue infiltration similar to prior, low suspicion for OM given no open wound in area  - 1/23 CT Chest with diffuse b/l patchy ggo with central predominance increased from 1/17/25 - edema vs pneumonia; unchanged LLL round atelectasis; small L pleural effusion   -- c/f PCP based on above repeat CT, ongoing fevers, worsening hypoxia now requiring HFNC, LDH increased, and h/o of being on steroids without ppx and iso malignancy, noted vancomycin added overnight, s/p hydrocortisone 1/22-1/23   - IP eval appreciated, pt prefers to avoid bronch d/t risk of difficulty weaning from vent  - 1/17, 1/19, 1/21, 1/24 Bcx remain NGTD  - 1/24 Aspergillus ag negative   1/25 ua negative, EBV serology c/w past infection, CMV PCR negative    1/26 fungitell > 500 - c/w suspicion for PJP  1/29 afebrile >48h now, WBC stable, weaned off HFNC-now on NC, no cough  1/31 WBC trend noted-likely reactive, now on NC 2L, afebrile, overall much improved   2/1 WBC normalized, on NC 2L  2/2 noted with hyperkalemia likely d/t bactrim, changed to atovaquone     s/p zosyn 1/17-1/20  s/p ertapenem 1/20- 1/24  s/p vancomycin x1 on 1/24  s/p meropenem 1/24-1/26   s/p IV bactrim 1/24-1/31  s/p PO bactrim 1/31-2/2     Recommendations:   Continue Atovaquone 750mg PO Q12h to complete total 21d course (including bactrim) on 2/13  -then can look at start on atovaquone 1500mg daily for ppx   Continue Prednisone -- 20mg PO daily from today- for 11 days (2/3-2/13)  Nephrology following, monitor renal function/K closely    Continue on valtrex ppx  Monitor temps/CBC  Aspiration precautions  Wound care, offloading as able, nutrition  Continue rest of care per primary team       Risa Ac M.D.  [facility]  Available on [employer] TEAMS - *PREFERRED*  [phone]  After 5pm on weekdays and all day on weekends - please call [phone]     Thank you for consulting us and involving us in the management of this patients case. In addition to reviewing history, imaging, documents, labs, microbiology, took into account antibiotic stewardship, local antibiogram and infection control strategies and potential transmission issues.

## 2025-02-03 NOTE — PROGRESS NOTE ADULT - PROBLEM SELECTOR PLAN 2
1/17 Admitted + for Coronavirus, 1/24 - CT chest with c/f new PCP pneumonia, recs DC IV vanc, switch IV erta to IV mick given hypoalbuminemia efficacy concerns and start IV bactrim for empiric PCP PNA treatment as well as steroid taper pred 40mg BID x5 days followed by pred 40mg QD x5d then 20mg QD.   1/24 fungitell high > 500; pending galactomannan, beta D glucan, Initially on HFNC, weaned to 6L NC     Plan: * Changed bactrim to PO*  -wean O2 as tolerated and monitor on pulse ox, currently on 2L NC  -f/u sputum culture and sputum for PCP PCR if able to expectorate   -f/u aspergillus Ag, in process   -Bactrim 320mg IV Q8h (based on pts weight) - plan for 21d course, now on PO  -c/w Prednisone 40mg PO BID x5d until 1/28 then 40mg daily x5d (1/29-2/2), then 20mg daily for 11 days (2/3-2/13)  -Continue on valtrex ppx  - Monitor temps/CBC 1/17 Admitted + for Coronavirus, 1/24 - CT chest with c/f new PCP pneumonia, recs DC IV vanc, switch IV erta to IV mick given hypoalbuminemia efficacy concerns and start IV bactrim for empiric PCP PNA treatment as well as steroid taper pred 40mg BID x5 days followed by pred 40mg QD x5d then 20mg QD.   1/24 fungitell high > 500; pending galactomannan, beta D glucan, Initially on HFNC, weaned to 6L NC     Plan:  -Cont on PO atovaquone 750mg q12 until 2/28 for total 21d course   -wean O2 as tolerated and monitor on pulse ox, currently on 2L NC  -f/u sputum culture and sputum for PCP PCR if able to expectorate   -f/u aspergillus Ag, in process   -Bactrim 320mg IV Q8h (based on pts weight) - plan for 21d course, now on PO  -c/w Prednisone 40mg PO BID x5d until 1/28 then 40mg daily x5d (1/29-2/2), then 20mg daily for 11 days (2/3-2/13)  -Continue on valtrex ppx  - Monitor temps/CBC 1/17 Admitted + for Coronavirus, 1/24 - CT chest with c/f new PCP pneumonia, recs DC IV vanc, switch IV erta to IV mick given hypoalbuminemia efficacy concerns and start IV bactrim for empiric PCP PNA treatment as well as steroid taper pred 40mg BID x5 days followed by pred 40mg QD x5d then 20mg QD.   1/24 fungitell high > 500; pending galactomannan, beta D glucan, Initially on HFNC, weaned to 6L NC     Plan:  -Cont on PO atovaquone 750mg q12 until 2/13 for total 21d course per ID recs  -wean O2 as tolerated and monitor on pulse ox, currently on 2L NC  -f/u sputum culture and sputum for PCP PCR if able to expectorate   -f/u aspergillus Ag, in process   -Bactrim 320mg IV Q8h (based on pts weight) - plan for 21d course, now on PO  -c/w Prednisone 40mg PO BID x5d until 1/28 then 40mg daily x5d (1/29-2/2), then 20mg daily for 11 days (2/3-2/13)  -Continue on valtrex ppx  - Monitor temps/CBC

## 2025-02-03 NOTE — PROGRESS NOTE ADULT - PROBLEM SELECTOR PLAN 12
DVT prophylaxis, Lovenox 40mg s/c daily.  Encourage ambulation.  PT consult.  Code status: full code, Dr. Garcia explained to wife (Ludmila) who is the HCP about patient's current health status and prognosis, options for code status was explained. His HCP expressed that she would like Pier[facility] to continue all life sustaining treatments therefore patient will remain as FULL CODE. DVT prophylaxis, Lovenox 40mg s/c daily.  Encourage ambulation.  PT consult.  Code status: DNR/ DNI

## 2025-02-03 NOTE — PROGRESS NOTE ADULT - ASSESSMENT
70 y/o M PMHx renal transplant 2012 (2/2 DM, s/p left nephrectomy), peripheral neuropathy, hypothyroidism, retroperitoneal fibrosis, HTN, HLD, GERD, anxiety/depression, ANGELIKA and recent admission at [facility] for sacral osteomyelitis and ESBL.coli urosepsis discharged on 12/18/24 to rehab. While admitted to [place] was noted to have hypercalcemia and liver masses which were biopsied on 12/16/24, biopsy revealed DLBCL. Patient received R mini CHOP on 12/28 now admitted for cycle #2 R-mini CHOP, upon admission patient is febrile and chemotherapy held, course c/b AHRF and c/f PJP pneumonia

## 2025-02-03 NOTE — PROGRESS NOTE ADULT - ASSESSMENT
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 Endocrinology consulted for hyperglycemia. Patient feels well, eating full meals and tolerating POs. FBG stable, no hypoglycemia. Given patient now tapered from Pred 40mg to 20mg QD, will lower Lantus to prevent hypoglycemia. Noted patient having consistent postprandial hyperglycemia, will keep mealtime doses the same since steroid taper for better BG control. Endocrine will closely monitor BG and adjust insulin as needed for BG goal 100-180mg/dL inpatient.     #Uncontrolled Type 2 Diabetes Mellitus with  #Steroid-induced hyperglycemia  - Follows with: Dr. Romero  - A1C with Estimated Average Glucose Result: 7.8 % (12-29-24)  - home regimen: Came from rehab, there he was on Lantus 38 units, Admelog 10 units with correction scale ( as per pt, he usually get Lantus 10-15 units BID and premeal insulin per sliding scale; usually 5-6 units at home, and uses Dexcom G6 with reader )   - eGFR: 70 mL/min/1.73m2 (01-26-25)  - glucose grossly elevated, no evidence of DKA on labs    Steroid schedule   Prednisone 40 mg BID  (1/24-1/28)  Prednisone 40 mg daily (1/29-2/2)  Prednisone 20 mg daily (2/3- 2/14)           The patient is a 69y Male with PMH of renal transplant, 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 Endocrinology consulted for hyperglycemia. Patient feels well, eating full meals and tolerating POs. FBG stable, no hypoglycemia. Given patient now tapered from Pred 40mg to 20mg QD, will lower Lantus to prevent hypoglycemia. Noted patient having consistent postprandial hyperglycemia but patient also was treated for hyperkalemia twice yesterday, will slightly adjust mealtime insulin for lunch/dinner given steroid wears off to prevent hypoglycemia. Endocrine will closely monitor BG and adjust insulin as needed for BG goal 100-180mg/dL inpatient.     #Uncontrolled Type 2 Diabetes Mellitus with  #Steroid-induced hyperglycemia  - Follows with: Dr. Romero  - A1C with Estimated Average Glucose Result: 7.8 % (12-29-24)  - home regimen: Came from rehab, there he was on Lantus 38 units, Admelog 10 units with correction scale ( as per pt, he usually get Lantus 10-15 units BID and premeal insulin per sliding scale; usually 5-6 units at home, and uses Dexcom G6 with reader )   - eGFR: 70 mL/min/1.73m2 (01-26-25)  - glucose grossly elevated, no evidence of DKA on labs    Steroid schedule   Prednisone 40 mg BID  (1/24-1/28)  Prednisone 40 mg daily (1/29-2/2)  Prednisone 20 mg daily (2/3- 2/14)           The patient is a 69y Male with PMH of renal transplant, 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 Endocrinology consulted for hyperglycemia. Patient feels well, eating full meals and tolerating POs. FBG stable, no hypoglycemia. Given patient now tapered from Pred 40mg to 20mg QD, will lower Lantus to prevent hypoglycemia. Noted patient having consistent postprandial hyperglycemia but patient also was treated for hyperkalemia twice yesterday, will slightly adjust mealtime insulin for lunch/dinner given steroid wears off to prevent hypoglycemia. Endocrine will closely monitor BG and adjust insulin as needed for BG goal 100-180mg/dL inpatient.       Addendum 1726: Given postprandial hyperglycemia with lowered mealtime insulin dose, will change mealtime insulin doses for lunch/dinner back to previous doses for better BG control.     #Uncontrolled Type 2 Diabetes Mellitus with  #Steroid-induced hyperglycemia  - Follows with: Dr. Romero  - A1C with Estimated Average Glucose Result: 7.8 % (12-29-24)  - home regimen: Came from rehab, there he was on Lantus 38 units, Admelog 10 units with correction scale ( as per pt, he usually get Lantus 10-15 units BID and premeal insulin per sliding scale; usually 5-6 units at home, and uses Dexcom G6 with reader )   - eGFR: 70 mL/min/1.73m2 (01-26-25)  - glucose grossly elevated, no evidence of DKA on labs    Steroid schedule   Prednisone 40 mg BID  (1/24-1/28)  Prednisone 40 mg daily (1/29-2/2)  Prednisone 20 mg daily (2/3- 2/14)

## 2025-02-03 NOTE — PROGRESS NOTE ADULT - SUBJECTIVE AND OBJECTIVE BOX
[facility]  WANG Mccoy Y. Patel, S. Shah, G. Casimir  [phone]  ([phone] - weekdays after 5pm and weekends)    Name: JAN CALVIN  Age/Gender: 69y Male  MRN: [id number]    Interval History:  Patient seen and examined this morning.   No new complaints noted.  Notes reviewed  No concerning overnight events  Afebrile   Allergies: No Known Allergies      Objective:  Vitals:   T(F): 98.4 (02-03-25 @ 08:58), Max: 98.4 (02-03-25 @ 08:58)  HR: 71 (02-03-25 @ 08:58) (62 - 74)  BP: 112/65 (02-03-25 @ 08:58) (112/58 - 147/68)  RR: 20 (02-03-25 @ 08:58) (18 - 20)  SpO2: 93% (02-03-25 @ 08:58) (93% - 98%)  Physical Examination:  General: no acute distress, NC 2L  HEENT: normocephalic, atraumatic, anicteric  Respiratory: no acc muscle use, breathing comfortably  Cardiovascular: S1 and S2 present  Gastrointestinal: normal appearing, nondistended  Extremities: no edema, no cyanosis  Skin: no visible rash    Laboratory Studies:  CBC:                       8.6    9.27  )-----------( 296      ( 03 Feb 2025 07:14 )             27.6     WBC Trend:  9.27 02-03-25 @ 07:14  9.08 02-02-25 @ 08:38  8.99 02-01-25 @ 06:55  13.05 01-31-25 @ 07:10  12.21 01-30-25 @ 07:05  10.78 01-29-25 @ 05:02  10.57 01-28-25 @ 07:36    CMP: 02-03    135  |  99  |  30[H]  ----------------------------<  99  5.1   |  26  |  0.85    Ca    10.4      03 Feb 2025 07:15  Phos  2.6     02-03  Mg     1.7     02-03    TPro  5.7[L]  /  Alb  2.7[L]  /  TBili  0.3  /  DBili  x   /  AST  25  /  ALT  24  /  AlkPhos  86  02-03    Creatinine: 0.85 mg/dL (02-03-25 @ 07:15)  Creatinine: 0.92 mg/dL (02-02-25 @ 22:19)  Creatinine: 0.86 mg/dL (02-02-25 @ 17:55)  Creatinine: 0.75 mg/dL (02-02-25 @ 13:39)  Creatinine: 0.80 mg/dL (02-02-25 @ 08:38)  Creatinine: 0.84 mg/dL (02-01-25 @ 06:54)  Creatinine: 0.98 mg/dL (01-31-25 @ 07:11)  Creatinine: 0.95 mg/dL (01-30-25 @ 07:06)  Creatinine: 1.05 mg/dL (01-29-25 @ 05:02)  Creatinine: 1.12 mg/dL (01-28-25 @ 07:34)    LIVER FUNCTIONS - ( 03 Feb 2025 07:15 )  Alb: 2.7 g/dL / Pro: 5.7 g/dL / ALK PHOS: 86 U/L / ALT: 24 U/L / AST: 25 U/L / GGT: x           Microbiology: reviewed   Culture - Blood (collected 01-24-25 @ 00:46)  Source: .Blood BLOOD  Final Report (01-29-25 @ 04:00):    No growth at 5 days    Culture - Blood (collected 01-24-25 @ 00:18)  Source: .Blood BLOOD  Final Report (01-29-25 @ 04:00):    No growth at 5 days    Culture - Urine (collected 01-22-25 @ 05:05)  Source: Clean Catch Clean Catch (Midstream)  Final Report (01-24-25 @ 13:31):    10,000 - 49,000 CFU/mL Candida albicans    "Susceptibilities not performed"    Culture - Blood (collected 01-21-25 @ 22:25)  Source: .Blood BLOOD  Final Report (01-27-25 @ 03:00):    No growth at 5 days    Culture - Blood (collected 01-21-25 @ 22:07)  Source: .Blood BLOOD  Final Report (01-27-25 @ 03:00):    No growth at 5 days    Radiology: reviewed     Medications:  acetaminophen     Tablet .. 650 milliGRAM(s) Oral every 6 hours PRN  amLODIPine   Tablet 10 milliGRAM(s) Oral daily  artificial tears (preservative free) Ophthalmic Solution 1 Drop(s) Both EYES every 6 hours  atovaquone  Suspension 750 milliGRAM(s) Oral every 12 hours  bisacodyl Suppository 10 milliGRAM(s) Rectal once PRN  buPROPion XL (24-Hour) . 300 milliGRAM(s) Oral daily  carvedilol 12.5 milliGRAM(s) Oral every 12 hours  chlorhexidine 4% Liquid 1 Application(s) Topical <User Schedule>  cloNIDine 0.1 milliGRAM(s) Oral three times a day  dextrose 5%. 1000 milliLiter(s) IV Continuous <Continuous>  dextrose 5%. 1000 milliLiter(s) IV Continuous <Continuous>  dextrose 50% Injectable 25 Gram(s) IV Push once  dextrose Oral Gel 15 Gram(s) Oral once PRN  enoxaparin Injectable 40 milliGRAM(s) SubCutaneous <User Schedule>  escitalopram 10 milliGRAM(s) Oral daily  glucagon  Injectable 1 milliGRAM(s) IntraMuscular once  hydrALAZINE 100 milliGRAM(s) Oral every 8 hours  insulin glargine Injectable (LANTUS) 6 Unit(s) SubCutaneous at bedtime  insulin lispro (ADMELOG) corrective regimen sliding scale   SubCutaneous <User Schedule>  insulin lispro (ADMELOG) corrective regimen sliding scale   SubCutaneous three times a day before meals  insulin lispro Injectable (ADMELOG) 14 Unit(s) SubCutaneous before breakfast  insulin lispro Injectable (ADMELOG) 14 Unit(s) SubCutaneous before lunch  insulin lispro Injectable (ADMELOG) 10 Unit(s) SubCutaneous with dinner  lactulose Syrup 15 Gram(s) Oral every 8 hours  levothyroxine 88 MICROGram(s) Oral daily  pantoprazole    Tablet 40 milliGRAM(s) Oral before breakfast  polyethylene glycol 3350 17 Gram(s) Oral every 12 hours  predniSONE   Tablet 20 milliGRAM(s) Oral daily  rosuvastatin 10 milliGRAM(s) Oral at bedtime  senna 2 Tablet(s) Oral at bedtime  sodium chloride 0.9% lock flush 10 milliLiter(s) IV Push every 1 hour PRN  sodium zirconium cyclosilicate 10 Gram(s) Oral every 8 hours  valACYclovir 500 milliGRAM(s) Oral every 12 hours    Current Antimicrobials:  atovaquone  Suspension 750 milliGRAM(s) Oral every 12 hours  valACYclovir 500 milliGRAM(s) Oral every 12 hours    Prior/Completed Antimicrobials:  ertapenem  IVPB  piperacillin/tazobactam IVPB.  piperacillin/tazobactam IVPB.-  vancomycin  IVPB

## 2025-02-03 NOTE — PROGRESS NOTE ADULT - SUBJECTIVE AND OBJECTIVE BOX
Patient seen today for follow up inpatient Diabetes Mellitus management.    Chief Complaint: Type 2 Diabetes Mellitus     INTERVAL HX:  Patient seen in [facility] 9MON 918 W1. Patient is alert and oriented, resting in bed. Patient reports feeling well, has been eating full meals and tolerating POs. FBG stable this am to 142mg/dL, 99mg/dL on blood serum noted to be on lower side. No hypoglycemia. Patient now tapered to oral Prednisone 20mg once daily x11 days (2/14). Blood glucose levels in the last 24hrs have been 110-253mg/dL.     Review of Systems:  General: As above.  Respiratory: Denies any SOB, GRAY, or cough.  Gastrointestinal: Denies any n/v/d or abdominal pain.   Endocrine: Denies any polyuria, polydipsia, polyphagia, visual changes, or numbness in feet.     Allergies  No Known Allergies      Intolerances  None.       MEDICATIONS  (STANDING):  acetaminophen     Tablet .. 650 milliGRAM(s) Oral every 6 hours PRN  amLODIPine   Tablet 10 milliGRAM(s) Oral daily  artificial tears (preservative free) Ophthalmic Solution 1 Drop(s) Both EYES every 6 hours  atovaquone  Suspension 750 milliGRAM(s) Oral every 12 hours  bisacodyl Suppository 10 milliGRAM(s) Rectal once PRN  buPROPion XL (24-Hour) . 300 milliGRAM(s) Oral daily  carvedilol 12.5 milliGRAM(s) Oral every 12 hours  chlorhexidine 4% Liquid 1 Application(s) Topical <User Schedule>  cloNIDine 0.1 milliGRAM(s) Oral three times a day  dextrose 5%. 1000 milliLiter(s) IV Continuous <Continuous>  dextrose 5%. 1000 milliLiter(s) IV Continuous <Continuous>  dextrose 50% Injectable 25 Gram(s) IV Push once  dextrose Oral Gel 15 Gram(s) Oral once PRN  enoxaparin Injectable 40 milliGRAM(s) SubCutaneous <User Schedule>  escitalopram 10 milliGRAM(s) Oral daily  glucagon  Injectable 1 milliGRAM(s) IntraMuscular once  hydrALAZINE 100 milliGRAM(s) Oral every 8 hours  insulin glargine Injectable (LANTUS) 6 Unit(s) SubCutaneous at bedtime  insulin lispro (ADMELOG) corrective regimen sliding scale   SubCutaneous <User Schedule>  insulin lispro (ADMELOG) corrective regimen sliding scale   SubCutaneous three times a day before meals  insulin lispro Injectable (ADMELOG) 14 Unit(s) SubCutaneous before breakfast  insulin lispro Injectable (ADMELOG) 14 Unit(s) SubCutaneous before lunch  insulin lispro Injectable (ADMELOG) 10 Unit(s) SubCutaneous with dinner  lactulose Syrup 15 Gram(s) Oral every 8 hours  levothyroxine 88 MICROGram(s) Oral daily  pantoprazole    Tablet 40 milliGRAM(s) Oral before breakfast  polyethylene glycol 3350 17 Gram(s) Oral every 12 hours  predniSONE   Tablet 20 milliGRAM(s) Oral daily  rosuvastatin 10 milliGRAM(s) Oral at bedtime  senna 2 Tablet(s) Oral at bedtime  sodium chloride 0.9% lock flush 10 milliLiter(s) IV Push every 1 hour PRN  sodium zirconium cyclosilicate 10 Gram(s) Oral every 8 hours  valACYclovir 500 milliGRAM(s) Oral every 12 hours      dextrose 50% Injectable 25 Gram(s) IV Push once  dextrose Oral Gel 15 Gram(s) Oral once PRN  glucagon  Injectable 1 milliGRAM(s) IntraMuscular once  insulin glargine Injectable (LANTUS) 6 Unit(s) SubCutaneous at bedtime  insulin lispro (ADMELOG) corrective regimen sliding scale   SubCutaneous <User Schedule>  insulin lispro (ADMELOG) corrective regimen sliding scale   SubCutaneous three times a day before meals  insulin lispro Injectable (ADMELOG) 14 Unit(s) SubCutaneous before breakfast  insulin lispro Injectable (ADMELOG) 14 Unit(s) SubCutaneous before lunch  insulin lispro Injectable (ADMELOG) 10 Unit(s) SubCutaneous with dinner  levothyroxine 88 MICROGram(s) Oral daily  predniSONE   Tablet 20 milliGRAM(s) Oral daily  rosuvastatin 10 milliGRAM(s) Oral at bedtime      insulin lispro (ADMELOG) corrective regimen sliding scale   SubCutaneous <User Schedule>  insulin lispro (ADMELOG) corrective regimen sliding scale   SubCutaneous three times a day before meals  insulin lispro Injectable (ADMELOG) 14 Unit(s) SubCutaneous before breakfast  insulin lispro Injectable (ADMELOG) 14 Unit(s) SubCutaneous before lunch  insulin lispro Injectable (ADMELOG) 10 Unit(s) SubCutaneous with dinner      PHYSICAL EXAM:  VITALS:   T(C): 36.9 (02-03-25 @ 08:58), Max: 36.9 (02-03-25 @ 08:58)  HR: 71 (02-03-25 @ 08:58) (62 - 71)  BP: 112/65 (02-03-25 @ 08:58) (112/58 - 147/68)  RR: 20 (02-03-25 @ 08:58) (20 - 20)  SpO2: 93% (02-03-25 @ 08:58) (93% - 98%)    GENERAL: In no acute distress  Respiratory: Respirations unlabored  Extremities: Warm and dry, no edema  NEURO: Alert and oriented, appropriate     LABS:  POCT Blood Glucose.: 142 mg/dL (02-03-25 @ 08:29)  POCT Blood Glucose.: 110 mg/dL (02-03-25 @ 03:18)  POCT Blood Glucose.: 159 mg/dL (02-02-25 @ 22:50)  POCT Blood Glucose.: 213 mg/dL (02-02-25 @ 21:03)  POCT Blood Glucose.: 279 mg/dL (02-02-25 @ 20:11)  POCT Blood Glucose.: 253 mg/dL (02-02-25 @ 18:43)  POCT Blood Glucose.: 298 mg/dL (02-02-25 @ 17:13)  POCT Blood Glucose.: 210 mg/dL (02-02-25 @ 13:42)  POCT Blood Glucose.: 293 mg/dL (02-02-25 @ 11:51)  POCT Blood Glucose.: 377 mg/dL (02-02-25 @ 10:41)  POCT Blood Glucose.: 307 mg/dL (02-02-25 @ 09:39)  POCT Blood Glucose.: 301 mg/dL (02-02-25 @ 08:33)  POCT Blood Glucose.: 246 mg/dL (02-02-25 @ 06:36)  POCT Blood Glucose.: 275 mg/dL (02-02-25 @ 01:48)  POCT Blood Glucose.: 392 mg/dL (02-01-25 @ 21:45)  POCT Blood Glucose.: 364 mg/dL (02-01-25 @ 17:34)  POCT Blood Glucose.: 334 mg/dL (02-01-25 @ 12:45)  POCT Blood Glucose.: 193 mg/dL (02-01-25 @ 08:27)  POCT Blood Glucose.: 322 mg/dL (02-01-25 @ 01:57)  POCT Blood Glucose.: 371 mg/dL (01-31-25 @ 21:37)  POCT Blood Glucose.: 385 mg/dL (01-31-25 @ 17:20)  POCT Blood Glucose.: 106 mg/dL (01-31-25 @ 12:23)                          8.6    9.27  )-----------( 296      ( 03 Feb 2025 07:14 )             27.6     02-03    135  |  99  |  30[H]  ----------------------------<  99  5.1   |  26  |  0.85    Ca    10.4      03 Feb 2025 07:15  Phos  2.6     02-03  Mg     1.7     02-03    TPro  5.7[L]  /  Alb  2.7[L]  /  TBili  0.3  /  DBili  x   /  AST  25  /  ALT  24  /  AlkPhos  86  02-03    LIVER FUNCTIONS - ( 03 Feb 2025 07:15 )  Alb: 2.7 g/dL / Pro: 5.7 g/dL / ALK PHOS: 86 U/L / ALT: 24 U/L / AST: 25 U/L / GGT: x               Urinalysis Basic - ( 03 Feb 2025 07:15 )    Color: x / Appearance: x / SG: x / pH: x  Gluc: 99 mg/dL / Ketone: x  / Bili: x / Urobili: x   Blood: x / Protein: x / Nitrite: x   Leuk Esterase: x / RBC: x / WBC x   Sq Epi: x / Non Sq Epi: x / Bacteria: x        A1C with Estimated Average Glucose Result: A1C with Estimated Average Glucose Result: 8.4 % (01-28-25 @ 07:38)  A1C with Estimated Average Glucose Result: 7.8 % (12-29-24 @ 07:07)  A1C with Estimated Average Glucose Result: 7.7 % (12-28-24 @ 10:06)  A1C with Estimated Average Glucose Result: 7.4 % (11-30-24 @ 06:40)       Patient seen today for follow up inpatient Diabetes Mellitus management.    Chief Complaint: Type 2 Diabetes Mellitus     INTERVAL HX:  Patient seen in [facility] 9MON 918 W1. Patient is alert and oriented, resting in bed. Patient reports feeling well, has been eating full meals and tolerating POs. FBG stable this am to 142mg/dL, 99mg/dL on blood serum noted to be on lower side. No hypoglycemia. Patient now tapered to oral Prednisone 20mg once daily x11 days (2/14). Noted postprandial hyperglycemia yesterday, patient also was treated twice yesterday for hyperkalemia with insulin/D50. Blood glucose levels in the last 24hrs have been 110-253mg/dL.     Review of Systems:  General: As above.  Respiratory: Denies any SOB, GRAY, or cough.  Gastrointestinal: Denies any n/v/d or abdominal pain.   Endocrine: Denies any polyuria, polydipsia, polyphagia, visual changes, or numbness in feet.     Allergies  No Known Allergies      Intolerances  None.       MEDICATIONS  (STANDING):  acetaminophen     Tablet .. 650 milliGRAM(s) Oral every 6 hours PRN  amLODIPine   Tablet 10 milliGRAM(s) Oral daily  artificial tears (preservative free) Ophthalmic Solution 1 Drop(s) Both EYES every 6 hours  atovaquone  Suspension 750 milliGRAM(s) Oral every 12 hours  bisacodyl Suppository 10 milliGRAM(s) Rectal once PRN  buPROPion XL (24-Hour) . 300 milliGRAM(s) Oral daily  carvedilol 12.5 milliGRAM(s) Oral every 12 hours  chlorhexidine 4% Liquid 1 Application(s) Topical <User Schedule>  cloNIDine 0.1 milliGRAM(s) Oral three times a day  dextrose 5%. 1000 milliLiter(s) IV Continuous <Continuous>  dextrose 5%. 1000 milliLiter(s) IV Continuous <Continuous>  dextrose 50% Injectable 25 Gram(s) IV Push once  dextrose Oral Gel 15 Gram(s) Oral once PRN  enoxaparin Injectable 40 milliGRAM(s) SubCutaneous <User Schedule>  escitalopram 10 milliGRAM(s) Oral daily  glucagon  Injectable 1 milliGRAM(s) IntraMuscular once  hydrALAZINE 100 milliGRAM(s) Oral every 8 hours  insulin glargine Injectable (LANTUS) 6 Unit(s) SubCutaneous at bedtime  insulin lispro (ADMELOG) corrective regimen sliding scale   SubCutaneous <User Schedule>  insulin lispro (ADMELOG) corrective regimen sliding scale   SubCutaneous three times a day before meals  insulin lispro Injectable (ADMELOG) 14 Unit(s) SubCutaneous before breakfast  insulin lispro Injectable (ADMELOG) 14 Unit(s) SubCutaneous before lunch  insulin lispro Injectable (ADMELOG) 10 Unit(s) SubCutaneous with dinner  lactulose Syrup 15 Gram(s) Oral every 8 hours  levothyroxine 88 MICROGram(s) Oral daily  pantoprazole    Tablet 40 milliGRAM(s) Oral before breakfast  polyethylene glycol 3350 17 Gram(s) Oral every 12 hours  predniSONE   Tablet 20 milliGRAM(s) Oral daily  rosuvastatin 10 milliGRAM(s) Oral at bedtime  senna 2 Tablet(s) Oral at bedtime  sodium chloride 0.9% lock flush 10 milliLiter(s) IV Push every 1 hour PRN  sodium zirconium cyclosilicate 10 Gram(s) Oral every 8 hours  valACYclovir 500 milliGRAM(s) Oral every 12 hours      dextrose 50% Injectable 25 Gram(s) IV Push once  dextrose Oral Gel 15 Gram(s) Oral once PRN  glucagon  Injectable 1 milliGRAM(s) IntraMuscular once  insulin glargine Injectable (LANTUS) 6 Unit(s) SubCutaneous at bedtime  insulin lispro (ADMELOG) corrective regimen sliding scale   SubCutaneous <User Schedule>  insulin lispro (ADMELOG) corrective regimen sliding scale   SubCutaneous three times a day before meals  insulin lispro Injectable (ADMELOG) 14 Unit(s) SubCutaneous before breakfast  insulin lispro Injectable (ADMELOG) 14 Unit(s) SubCutaneous before lunch  insulin lispro Injectable (ADMELOG) 10 Unit(s) SubCutaneous with dinner  levothyroxine 88 MICROGram(s) Oral daily  predniSONE   Tablet 20 milliGRAM(s) Oral daily  rosuvastatin 10 milliGRAM(s) Oral at bedtime      insulin lispro (ADMELOG) corrective regimen sliding scale   SubCutaneous <User Schedule>  insulin lispro (ADMELOG) corrective regimen sliding scale   SubCutaneous three times a day before meals  insulin lispro Injectable (ADMELOG) 14 Unit(s) SubCutaneous before breakfast  insulin lispro Injectable (ADMELOG) 14 Unit(s) SubCutaneous before lunch  insulin lispro Injectable (ADMELOG) 10 Unit(s) SubCutaneous with dinner      PHYSICAL EXAM:  VITALS:   T(C): 36.9 (02-03-25 @ 08:58), Max: 36.9 (02-03-25 @ 08:58)  HR: 71 (02-03-25 @ 08:58) (62 - 71)  BP: 112/65 (02-03-25 @ 08:58) (112/58 - 147/68)  RR: 20 (02-03-25 @ 08:58) (20 - 20)  SpO2: 93% (02-03-25 @ 08:58) (93% - 98%)    GENERAL: In no acute distress  Respiratory: Respirations unlabored  Extremities: Warm and dry, no edema  NEURO: Alert and oriented, appropriate     LABS:  POCT Blood Glucose.: 142 mg/dL (02-03-25 @ 08:29)  POCT Blood Glucose.: 110 mg/dL (02-03-25 @ 03:18)  POCT Blood Glucose.: 159 mg/dL (02-02-25 @ 22:50)  POCT Blood Glucose.: 213 mg/dL (02-02-25 @ 21:03)  POCT Blood Glucose.: 279 mg/dL (02-02-25 @ 20:11)  POCT Blood Glucose.: 253 mg/dL (02-02-25 @ 18:43)  POCT Blood Glucose.: 298 mg/dL (02-02-25 @ 17:13)  POCT Blood Glucose.: 210 mg/dL (02-02-25 @ 13:42)  POCT Blood Glucose.: 293 mg/dL (02-02-25 @ 11:51)  POCT Blood Glucose.: 377 mg/dL (02-02-25 @ 10:41)  POCT Blood Glucose.: 307 mg/dL (02-02-25 @ 09:39)  POCT Blood Glucose.: 301 mg/dL (02-02-25 @ 08:33)  POCT Blood Glucose.: 246 mg/dL (02-02-25 @ 06:36)  POCT Blood Glucose.: 275 mg/dL (02-02-25 @ 01:48)  POCT Blood Glucose.: 392 mg/dL (02-01-25 @ 21:45)  POCT Blood Glucose.: 364 mg/dL (02-01-25 @ 17:34)  POCT Blood Glucose.: 334 mg/dL (02-01-25 @ 12:45)  POCT Blood Glucose.: 193 mg/dL (02-01-25 @ 08:27)  POCT Blood Glucose.: 322 mg/dL (02-01-25 @ 01:57)  POCT Blood Glucose.: 371 mg/dL (01-31-25 @ 21:37)  POCT Blood Glucose.: 385 mg/dL (01-31-25 @ 17:20)  POCT Blood Glucose.: 106 mg/dL (01-31-25 @ 12:23)                          8.6    9.27  )-----------( 296      ( 03 Feb 2025 07:14 )             27.6     02-03    135  |  99  |  30[H]  ----------------------------<  99  5.1   |  26  |  0.85    Ca    10.4      03 Feb 2025 07:15  Phos  2.6     02-03  Mg     1.7     02-03    TPro  5.7[L]  /  Alb  2.7[L]  /  TBili  0.3  /  DBili  x   /  AST  25  /  ALT  24  /  AlkPhos  86  02-03    LIVER FUNCTIONS - ( 03 Feb 2025 07:15 )  Alb: 2.7 g/dL / Pro: 5.7 g/dL / ALK PHOS: 86 U/L / ALT: 24 U/L / AST: 25 U/L / GGT: x               Urinalysis Basic - ( 03 Feb 2025 07:15 )    Color: x / Appearance: x / SG: x / pH: x  Gluc: 99 mg/dL / Ketone: x  / Bili: x / Urobili: x   Blood: x / Protein: x / Nitrite: x   Leuk Esterase: x / RBC: x / WBC x   Sq Epi: x / Non Sq Epi: x / Bacteria: x        A1C with Estimated Average Glucose Result: A1C with Estimated Average Glucose Result: 8.4 % (01-28-25 @ 07:38)  A1C with Estimated Average Glucose Result: 7.8 % (12-29-24 @ 07:07)  A1C with Estimated Average Glucose Result: 7.7 % (12-28-24 @ 10:06)  A1C with Estimated Average Glucose Result: 7.4 % (11-30-24 @ 06:40)

## 2025-02-03 NOTE — PROGRESS NOTE ADULT - PROBLEM SELECTOR PLAN 1
INPATIENT PLAN:  - Check BG TID AC, HS, and 2AM  - Adjust Lantus to 6u QHS  - C/w Admelog 14u with breakfast, 14u with lunch, and 10u with dinner (HOLD if NPO or steroid held)  - C/w moderate dose Admelog correctional scales TID AC, HS, and 2AM  - Please keep hypoglycemia protocol in place     DISCHARGE PLANNING:  - Discharge recs pending clinical course and depending on steroids. Will need basal/bolus (doses TBD).   - Endocrine follow up: can f/u wit his Endocrinologist Dr. Romero.   - Recommend routine outpatient ophthalmology and podiatry follow up. INPATIENT PLAN:  - Check BG TID AC, HS, and 2AM  - Adjust Lantus to 6u QHS  - Adjust Admelog to 14u with breakfast, 12u with lunch, and 8u with dinner (HOLD if NPO or steroid held)  - C/w moderate dose Admelog correctional scales TID AC, HS, and 2AM  - Please keep hypoglycemia protocol in place     DISCHARGE PLANNING:  - Discharge recs pending clinical course and depending on steroids. Will need basal/bolus (doses TBD).   - Endocrine follow up: can f/u wit his Endocrinologist Dr. Romero.   - Recommend routine outpatient ophthalmology and podiatry follow up. INPATIENT PLAN:  - Check BG TID AC, HS, and 2AM  - Adjust Lantus to 6u QHS  - Adjust Admelog to 14u with breakfast, 14u with lunch, and 10u with dinner (HOLD if NPO or steroid held)  - C/w moderate dose Admelog correctional scales TID AC, HS, and 2AM  - Please keep hypoglycemia protocol in place     DISCHARGE PLANNING:  - Discharge recs pending clinical course and depending on steroids. Will need basal/bolus (doses TBD).   - Endocrine follow up: can f/u wit his Endocrinologist Dr. Romero.   - Recommend routine outpatient ophthalmology and podiatry follow up.

## 2025-02-04 LAB
ALBUMIN SERPL ELPH-MCNC: 2.8 G/DL — LOW (ref 3.3–5)
ALP SERPL-CCNC: 94 U/L — SIGNIFICANT CHANGE UP (ref 40–120)
ALT FLD-CCNC: 25 U/L — SIGNIFICANT CHANGE UP (ref 10–45)
ANION GAP SERPL CALC-SCNC: 13 MMOL/L — SIGNIFICANT CHANGE UP (ref 5–17)
ANISOCYTOSIS BLD QL: SLIGHT — SIGNIFICANT CHANGE UP
AST SERPL-CCNC: 23 U/L — SIGNIFICANT CHANGE UP (ref 10–40)
BASOPHILS # BLD AUTO: 0 K/UL — SIGNIFICANT CHANGE UP (ref 0–0.2)
BASOPHILS NFR BLD AUTO: 0 % — SIGNIFICANT CHANGE UP (ref 0–2)
BILIRUB SERPL-MCNC: 0.4 MG/DL — SIGNIFICANT CHANGE UP (ref 0.2–1.2)
BUN SERPL-MCNC: 32 MG/DL — HIGH (ref 7–23)
CALCIUM SERPL-MCNC: 9.9 MG/DL — SIGNIFICANT CHANGE UP (ref 8.4–10.5)
CHLORIDE SERPL-SCNC: 101 MMOL/L — SIGNIFICANT CHANGE UP (ref 96–108)
CO2 SERPL-SCNC: 25 MMOL/L — SIGNIFICANT CHANGE UP (ref 22–31)
CREAT SERPL-MCNC: 0.94 MG/DL — SIGNIFICANT CHANGE UP (ref 0.5–1.3)
EGFR: 89 ML/MIN/1.73M2 — SIGNIFICANT CHANGE UP
EOSINOPHIL # BLD AUTO: 0.08 K/UL — SIGNIFICANT CHANGE UP (ref 0–0.5)
EOSINOPHIL NFR BLD AUTO: 0.9 % — SIGNIFICANT CHANGE UP (ref 0–6)
GLUCOSE BLDC GLUCOMTR-MCNC: 212 MG/DL — HIGH (ref 70–99)
GLUCOSE BLDC GLUCOMTR-MCNC: 213 MG/DL — HIGH (ref 70–99)
GLUCOSE BLDC GLUCOMTR-MCNC: 217 MG/DL — HIGH (ref 70–99)
GLUCOSE BLDC GLUCOMTR-MCNC: 247 MG/DL — HIGH (ref 70–99)
GLUCOSE BLDC GLUCOMTR-MCNC: 326 MG/DL — HIGH (ref 70–99)
GLUCOSE SERPL-MCNC: 180 MG/DL — HIGH (ref 70–99)
HCT VFR BLD CALC: 29.3 % — LOW (ref 39–50)
HGB BLD-MCNC: 9 G/DL — LOW (ref 13–17)
LYMPHOCYTES # BLD AUTO: 0.31 K/UL — LOW (ref 1–3.3)
LYMPHOCYTES # BLD AUTO: 3.6 % — LOW (ref 13–44)
MACROCYTES BLD QL: SLIGHT — SIGNIFICANT CHANGE UP
MAGNESIUM SERPL-MCNC: 1.7 MG/DL — SIGNIFICANT CHANGE UP (ref 1.6–2.6)
MANUAL SMEAR VERIFICATION: SIGNIFICANT CHANGE UP
MCHC RBC-ENTMCNC: 29.4 PG — SIGNIFICANT CHANGE UP (ref 27–34)
MCHC RBC-ENTMCNC: 30.7 G/DL — LOW (ref 32–36)
MCV RBC AUTO: 95.8 FL — SIGNIFICANT CHANGE UP (ref 80–100)
MONOCYTES # BLD AUTO: 0.23 K/UL — SIGNIFICANT CHANGE UP (ref 0–0.9)
MONOCYTES NFR BLD AUTO: 2.7 % — SIGNIFICANT CHANGE UP (ref 2–14)
MYELOCYTES NFR BLD: 0.9 % — HIGH (ref 0–0)
NEUTROPHILS # BLD AUTO: 7.75 K/UL — HIGH (ref 1.8–7.4)
NEUTROPHILS NFR BLD AUTO: 89.2 % — HIGH (ref 43–77)
NEUTS BAND # BLD: 0.9 % — SIGNIFICANT CHANGE UP (ref 0–8)
NEUTS BAND NFR BLD: 0.9 % — SIGNIFICANT CHANGE UP (ref 0–8)
PHOSPHATE SERPL-MCNC: 2.3 MG/DL — LOW (ref 2.5–4.5)
PLAT MORPH BLD: NORMAL — SIGNIFICANT CHANGE UP
PLATELET # BLD AUTO: 298 K/UL — SIGNIFICANT CHANGE UP (ref 150–400)
POTASSIUM SERPL-MCNC: 4 MMOL/L — SIGNIFICANT CHANGE UP (ref 3.5–5.3)
POTASSIUM SERPL-SCNC: 4 MMOL/L — SIGNIFICANT CHANGE UP (ref 3.5–5.3)
PROT SERPL-MCNC: 5.8 G/DL — LOW (ref 6–8.3)
RBC # BLD: 3.06 M/UL — LOW (ref 4.2–5.8)
RBC # FLD: 19.9 % — HIGH (ref 10.3–14.5)
RBC BLD AUTO: SIGNIFICANT CHANGE UP
SODIUM SERPL-SCNC: 139 MMOL/L — SIGNIFICANT CHANGE UP (ref 135–145)
VARIANT LYMPHS # BLD: 1.8 % — SIGNIFICANT CHANGE UP (ref 0–6)
VARIANT LYMPHS NFR BLD MANUAL: 1.8 % — SIGNIFICANT CHANGE UP (ref 0–6)
WBC # BLD: 8.6 K/UL — SIGNIFICANT CHANGE UP (ref 3.8–10.5)
WBC # FLD AUTO: 8.6 K/UL — SIGNIFICANT CHANGE UP (ref 3.8–10.5)

## 2025-02-04 PROCEDURE — 99232 SBSQ HOSP IP/OBS MODERATE 35: CPT

## 2025-02-04 PROCEDURE — 99232 SBSQ HOSP IP/OBS MODERATE 35: CPT | Mod: GC

## 2025-02-04 RX ORDER — POTASSIUM PHOSPHATE, MONOBASIC POTASSIUM PHOSPHATE, DIBASIC 236; 224 MG/ML; MG/ML
15 INJECTION, SOLUTION INTRAVENOUS ONCE
Refills: 0 | Status: COMPLETED | OUTPATIENT
Start: 2025-02-04 | End: 2025-02-05

## 2025-02-04 RX ORDER — MAGNESIUM SULFATE 0.8 MEQ/ML
2 AMPUL (ML) INJECTION ONCE
Refills: 0 | Status: COMPLETED | OUTPATIENT
Start: 2025-02-04 | End: 2025-02-05

## 2025-02-04 RX ADMIN — Medication 15 GRAM(S): at 06:17

## 2025-02-04 RX ADMIN — Medication 15 GRAM(S): at 13:16

## 2025-02-04 RX ADMIN — ATOVAQUONE 750 MILLIGRAM(S): 750 SUSPENSION ORAL at 06:17

## 2025-02-04 RX ADMIN — Medication 10 UNIT(S): at 17:24

## 2025-02-04 RX ADMIN — INSULIN GLARGINE-YFGN 6 UNIT(S): 100 INJECTION, SOLUTION SUBCUTANEOUS at 21:23

## 2025-02-04 RX ADMIN — Medication 12.5 MILLIGRAM(S): at 17:23

## 2025-02-04 RX ADMIN — Medication 15 GRAM(S): at 21:24

## 2025-02-04 RX ADMIN — Medication 1 DROP(S): at 06:18

## 2025-02-04 RX ADMIN — BUPROPION HYDROCHLORIDE 300 MILLIGRAM(S): 150 TABLET, EXTENDED RELEASE ORAL at 13:17

## 2025-02-04 RX ADMIN — ANTISEPTIC SURGICAL SCRUB 1 APPLICATION(S): 0.04 SOLUTION TOPICAL at 09:26

## 2025-02-04 RX ADMIN — PREDNISONE 20 MILLIGRAM(S): 5 TABLET ORAL at 06:17

## 2025-02-04 RX ADMIN — VALACYCLOVIR 500 MILLIGRAM(S): 1000 TABLET ORAL at 06:18

## 2025-02-04 RX ADMIN — CLONIDINE HYDROCHLORIDE 0.1 MILLIGRAM(S): 0.2 TABLET ORAL at 21:24

## 2025-02-04 RX ADMIN — Medication 1 DROP(S): at 21:30

## 2025-02-04 RX ADMIN — Medication 14 UNIT(S): at 09:25

## 2025-02-04 RX ADMIN — CLONIDINE HYDROCHLORIDE 0.1 MILLIGRAM(S): 0.2 TABLET ORAL at 13:17

## 2025-02-04 RX ADMIN — Medication 1 DROP(S): at 17:23

## 2025-02-04 RX ADMIN — Medication 4: at 17:24

## 2025-02-04 RX ADMIN — Medication 4: at 09:25

## 2025-02-04 RX ADMIN — POLYETHYLENE GLYCOL 3350 17 GRAM(S): 17 POWDER, FOR SOLUTION ORAL at 17:23

## 2025-02-04 RX ADMIN — Medication 100 MILLIGRAM(S): at 13:17

## 2025-02-04 RX ADMIN — ESCITALOPRAM 10 MILLIGRAM(S): 10 TABLET, FILM COATED ORAL at 13:18

## 2025-02-04 RX ADMIN — ENOXAPARIN SODIUM 40 MILLIGRAM(S): 100 INJECTION SUBCUTANEOUS at 21:22

## 2025-02-04 RX ADMIN — LEVOTHYROXINE SODIUM 88 MICROGRAM(S): 25 TABLET ORAL at 06:18

## 2025-02-04 RX ADMIN — PANTOPRAZOLE 40 MILLIGRAM(S): 20 TABLET, DELAYED RELEASE ORAL at 06:17

## 2025-02-04 RX ADMIN — ATOVAQUONE 750 MILLIGRAM(S): 750 SUSPENSION ORAL at 17:23

## 2025-02-04 RX ADMIN — SODIUM ZIRCONIUM CYCLOSILICATE 10 GRAM(S): 5 POWDER, FOR SUSPENSION ORAL at 04:03

## 2025-02-04 RX ADMIN — Medication 100 MILLIGRAM(S): at 21:23

## 2025-02-04 RX ADMIN — Medication 4: at 01:23

## 2025-02-04 RX ADMIN — Medication 1 DROP(S): at 13:18

## 2025-02-04 RX ADMIN — ROSUVASTATIN CALCIUM 10 MILLIGRAM(S): 10 TABLET, FILM COATED ORAL at 21:23

## 2025-02-04 RX ADMIN — Medication 4: at 13:20

## 2025-02-04 RX ADMIN — Medication 2 TABLET(S): at 21:23

## 2025-02-04 RX ADMIN — Medication 14 UNIT(S): at 13:24

## 2025-02-04 RX ADMIN — VALACYCLOVIR 500 MILLIGRAM(S): 1000 TABLET ORAL at 17:23

## 2025-02-04 RX ADMIN — SODIUM ZIRCONIUM CYCLOSILICATE 10 GRAM(S): 5 POWDER, FOR SUSPENSION ORAL at 14:08

## 2025-02-04 NOTE — ADVANCED PRACTICE NURSE CONSULT - RECOMMEDATIONS
Impression  urinary incontinence  stool incontinence  Unstageable bilateral sacrum/coccyx/buttocks    Recommendations   1. Bilateral sacrum/coccyx /buttock unstageable pressure injury  Topical therapy- GLUTEAL CLEFT- cleanse w/normale saline, pat dry & apply a dap of Medihoney and cover with Alleyvn daily & prn soiling . THE REST of b/l sacrum/buttocks cleanse w/normale saline, pat dry  apply Cavilon daily . Monitor for changes .  2. Right and left heel  Elevate heels; apply Complete Cair air fluidized boots; ensure that the soles of the feet are not resting on the foot board of the bed.  3  Incontinent management - incontinent cleanser, pads,  julio care  BID  4. Maintain on an alternating air with low air loss surface   5. Turn & reposition every 2 hr; Use positioning pillow to turn and reposition, soft pillow between bony prominences; continue measures to decrease friction/shear/pressure.  6. Nutrition optimization.  7. Place  waffle cushion when out of bed to chair .   Plan of care reviewed with covering staff NURA Estrada         Impression  urinary incontinence  stool incontinence  Unstageable bilateral sacrum/coccyx/buttocks present on admission    Recommendations   1. Bilateral sacrum/coccyx /buttock unstageable pressure injury  Topical therapy- GLUTEAL CLEFT- cleanse w/normale saline, pat dry & apply a dap of Medihoney and cover with Alleyvn daily & prn soiling . THE REST of b/l sacrum/buttocks cleanse w/normale saline, pat dry  apply Cavilon daily . Monitor for changes .  2. Right and left heel  Elevate heels; apply Complete Cair air fluidized boots; ensure that the soles of the feet are not resting on the foot board of the bed.  3  Incontinent management - incontinent cleanser, pads,  julio care  BID  4. Maintain on an alternating air with low air loss surface   5. Turn & reposition every 2 hr; Use positioning pillow to turn and reposition, soft pillow between bony prominences; continue measures to decrease friction/shear/pressure.  6. Nutrition optimization.  7. Place  waffle cushion when out of bed to chair .   Plan of care reviewed with covering staff NURA Estrada

## 2025-02-04 NOTE — PROGRESS NOTE ADULT - PROBLEM SELECTOR PLAN 8
Stage 2 sacrococcygeal decubitus and incontinent dermatitis +  Wound recs appreciated Stage 2 sacrococcygeal decubitus and incontinent dermatitis. Sterile dressing over pressure ulcer.   Wound recs appreciated

## 2025-02-04 NOTE — ADVANCED PRACTICE NURSE CONSULT - ASSESSMENT
Arrived on unit, patient was found lying in a low air loss pressure redistribution support surface style bed.  patient is able to turn independently wit minimal assistance. Once turned,  incontinence of stool was apparent and julio care was provided.  Bilateral sacrum/coccyx/ buttocks full thickness skin and tissue loss  measuring approximately 7 cm x 6cm x unknown  in which the extent of tissue damage cannot be confirmed because obscured by slough is consistent with unstageable pressure injury.  Patient  was educated  on the importance  for turning  and positioning  every 2 hours. The use of waffle cushion when out of bed  to chair,  and to shift her Weight every 2 hours while in chair. The importance a keeping her skin clean and dry and  to offload feet/heels , and optimal nutrition. Arrived on unit, patient was found lying in a low air loss pressure redistribution support surface style bed.  patient is able to turn independently wit minimal assistance. Once turned,  incontinence of stool was apparent and julio care was provided.  Bilateral sacrum/coccyx/ buttocks full thickness skin and tissue loss  measuring approximately 7 cm x 6cm x unknown  in which the extent of tissue damage cannot be confirmed because obscured by slough is consistent with unstageable pressure injury, present on admission.  Patient  was educated  on the importance  for turning  and positioning  every 2 hours. The use of waffle cushion when out of bed  to chair,  and to shift her Weight every 2 hours while in chair. The importance a keeping her skin clean and dry and  to offload feet/heels , and optimal nutrition.

## 2025-02-04 NOTE — PROGRESS NOTE ADULT - PROBLEM SELECTOR PLAN 3
Course currently c/b steroid induced hyperglycemia     -Endocrinology following, recs appreciated  -C/w Lantus QHS, Admelog TID before meals (HOLD if NPO or not eating). Taper down preemptively if steroids are being reduced to avoid hypoglycemia.   - Recommend moderate dose admelog correction scale TIDQAC and separate moderate dose scale QHS  - POCT glucose before meals and QHS, or q6h while NPO  - Inpatient glucose goals: <140 pre-meal, <180 random  - consistent carb diet Course currently c/b steroid induced hyperglycemia     -Endocrinology following, recs appreciated  -C/w Lantus 6mg QHS, Admelog TID (14mg/14mg/10mg) before meals (HOLD if NPO or not eating). Taper down preemptively if steroids are being reduced to avoid hypoglycemia.   - Recommend moderate dose admelog correction scale TIDQAC and separate moderate dose scale QHS  - POCT glucose before meals and QHS, or q6h while NPO  - Inpatient glucose goals: <140 pre-meal, <180 random  - consistent carb diet

## 2025-02-04 NOTE — PROGRESS NOTE ADULT - ASSESSMENT
70 y/o M PMHx renal transplant 2012 (2/2 DM, s/p left nephrectomy), peripheral neuropathy, hypothyroidism, retroperitoneal fibrosis, HTN, HLD, GERD, anxiety/depression, ANGELIKA and recent admission at [facility] for sacral osteomyelitis and ESBL.coli urosepsis discharged on 12/18/24 to rehab. While admitted to [place] was noted to have hypercalcemia and liver masses which were biopsied on 12/16/24, biopsy revealed DLBCL. Patient received R mini CHOP on 12/28 now admitted for cycle #2 R-mini CHOP, upon admission patient is febrile and chemotherapy held, course c/b AHRF and c/f PJP pneumonia 70 y/o M PMHx renal transplant 2012 (2/2 DM, s/p left nephrectomy), peripheral neuropathy, hypothyroidism, retroperitoneal fibrosis, HTN, HLD, GERD, anxiety/depression, ANGELIKA and recent admission at [facility] for sacral osteomyelitis and ESBL.coli urosepsis discharged on 12/18/24 to rehab. While admitted to [place] was noted to have hypercalcemia and liver masses which were biopsied on 12/16/24, biopsy revealed DLBCL. Patient received R mini CHOP on 12/28 now admitted for cycle #2 R-mini CHOP, upon admission patient is febrile and chemotherapy held, course c/b AHRF and c/f PJP pneumonia. ready for d/c pending MILO placement at [facility].

## 2025-02-04 NOTE — PROVIDER CONTACT NOTE (MEDICATION) - ASSESSMENT
Pt is AOx4, at baseline. Pt stated he has not had a problem having BMs since his enema on 2/2/25. Pt has had x2 large, soft, and formed BMs from 1900 until now, and had x2 BMs during the daytime. Pt stating he does not wish to take senna and miralax as he does not feel he needs them at this time

## 2025-02-04 NOTE — ADVANCED PRACTICE NURSE CONSULT - REASON FOR CONSULT
Consult to assess patient skin initiated by RN for sacrum Stage II.    Reason for Admission: "For chemo"  History of Present Illness:   M 68 y/o M PMHx renal transplant 2012 (2/2 DM, s/p left nephrectomy), peripheral neuropathy, hypothyroidism, retroperitoneal fibrosis, HTN, HLD, GERD, anxiety/depression, ANGELIKA and recent admission at [facility] for sacral osteomyelitis and ESBL.coli urosepsis discharged on 12/18/24 to rehab. While admitted to [place] was noted to have hypercalcemia and liver masses which were biopsied on 12/16/24, biopsy revealed DLBCL. Patient received R mini CHOP on 12/28 now admitted for cycle #2 Rmini CHOP, upon admission patient is febrile will hold chemotherapy today.     
Wound consultation requested to assess skin ; sacrum suspected stage II pressure injury, present on admission.   HPI: 68 y/o M PMHx renal transplant 2012, peripheral neuropathy, hypothyroidism, retroperitoneal fibrosis, HTN, HLD, GERD, anxiety/depression, ANGELIKA presenting with Post-transplant lymphoproliferative disorder
Requested by staff to assess skin status: b/l buttocks. PMH is noted:    70 y/o M PMHx renal transplant 2012 (2/2 DM, s/p left nephrectomy), peripheral neuropathy, hypothyroidism, retroperitoneal fibrosis, HTN, HLD, GERD, anxiety/depression, ANGELIKA and recent admission at [facility] for sacral osteomyelitis and ESBL.coli urosepsis discharged on 12/18/24 to rehab. While admitted to [place] was noted to have hypercalcemia and liver masses which were biopsied on 12/16/24, biopsy revealed DLBCL. Patient received R mini CHOP on 12/28 now admitted for cycle #2 R-mini CHOP, upon admission patient is febrile and chemotherapy held, course c/b AHRF and c/f PJP pneumonia  The pt was last seen by the Wound Care team on 1/24.
Consult to assess patient skin initiated by RN for sacrum stage 2 pressure injury,  present on admission.    Admit Diagnosis) Post-transplant lymphoproliferative disorder  (PMH) Hypothyroidism  (PMH) History of renal transplant  (PMH) Shoulder fracture  (PMH) Peripheral neuropathy  (PMH) Diabetes Mellitus Type II  (PMH) ANGELIKA (obstructive sleep apnea)  (PMH) Hypertension  (PMH) Kidney transplanted  (PMH) Hyperlipidemia  (PMH) Hypothyroidism  (PMH) Depression  (PMH) GERD (gastroesophageal reflux disease)  (PSH) H/O unilateral nephrectomy  (PSH) S/P left cataract extraction  (PSH) S/P right cataract extraction  (PSH) AV fistula  (PSH) Retroperitoneal fibrosis  (PSH) S/P cholecystectomy  (PSH) S/P kidney transplant  (PSH) History of colonoscopy

## 2025-02-04 NOTE — PROGRESS NOTE ADULT - PROBLEM SELECTOR PLAN 2
1/17 Admitted + for Coronavirus, 1/24 - CT chest with c/f new PCP pneumonia, recs DC IV vanc, switch IV erta to IV mick given hypoalbuminemia efficacy concerns and start IV bactrim for empiric PCP PNA treatment as well as steroid taper pred 40mg BID x5 days followed by pred 40mg QD x5d then 20mg QD.   1/24 fungitell high > 500; pending galactomannan, beta D glucan, Initially on HFNC, weaned to 6L NC     Plan: * Changed bactrim to PO*  -wean O2 as tolerated and monitor on pulse ox, currently on 2L NC  -f/u sputum culture and sputum for PCP PCR if able to expectorate   -f/u aspergillus Ag, in process   -Bactrim 320mg IV Q8h (based on pts weight) - plan for 21d course, now on PO  -c/w Prednisone 40mg PO BID x5d until 1/28 then 40mg daily x5d (1/29-2/2), then 20mg daily for 11 days (2/3-2/13)  -Continue on valtrex ppx  - Monitor temps/CBC 1/17 Admitted + for Coronavirus, 1/24 - CT chest with c/f new PCP pneumonia, recs DC IV vanc, switch IV erta to IV mick given hypoalbuminemia efficacy concerns and start IV bactrim for empiric PCP PNA treatment as well as steroid taper pred 40mg BID x5 days followed by pred 40mg QD x5d then 20mg QD.   1/24 fungitell high > 500; pending galactomannan, beta D glucan, Initially on HFNC, weaned to 6L NC     Plan:  -Continue on PO atovaquone 750mg qD until 2/13 to complete 21d course (including Bactrim). Start prophylactic dose of 1500mg Atovaquone ppx.   -wean O2 as tolerated and monitor on pulse ox, currently on 2L NC  -f/u sputum culture and sputum for PCP PCR if able to expectorate   -f/u aspergillus Ag, in process   -c/w Prednisone 40mg PO BID x5d until 1/28 then 40mg daily x5d (1/29-2/2), then 20mg daily for 11 days (2/3-2/13)  -Continue on valtrex ppx  - Monitor temps/CBC 1/17 Admitted + for Coronavirus, 1/24 - CT chest with c/f new PCP pneumonia, recs DC IV vanc, switch IV erta to IV mick given hypoalbuminemia efficacy concerns and start IV bactrim for empiric PCP PNA treatment as well as steroid taper pred 40mg BID x5 days followed by pred 40mg QD x5d then 20mg QD.   1/24 fungitell high > 500; pending galactomannan, beta D glucan, Initially on HFNC, weaned to 6L NC     Plan:  -Continue on PO atovaquone 750mg q12 until 2/13 to complete 21d course (including Bactrim). Start prophylactic dose of 1500mg Atovaquone ppx.   -wean O2 as tolerated and monitor on pulse ox, currently on 2L NC  -f/u sputum culture and sputum for PCP PCR if able to expectorate   -f/u aspergillus Ag, in process   -c/w Prednisone 40mg PO BID x5d until 1/28 then 40mg daily x5d (1/29-2/2), then 20mg daily for 11 days (2/3-2/13)  -Continue on valtrex ppx  - Monitor temps/CBC

## 2025-02-04 NOTE — PROGRESS NOTE ADULT - ASSESSMENT
Patient is a 69 year old male with PMH of renal transplant 2012 (2/2 DM, s/p left nephrectomy), peripheral neuropathy, hypothyroidism, retroperitoneal fibrosis, HTN, HLD, GERD, anxiety/depression, ANGELIKA and recent admission at [facility] for ESBL E.coli urosepsis, imaging with ?sacral OM though pt with no sacral wound suspected findings likely related to mets, he was discharged on 12/18/24 to rehab, recently diagnosed DLBCL while admitted to [place] when he was noted to have hypercalcemia and liver masses s/p biopsy 12/16/24, now s/p R mini CHOP on 12/28 who was admitted 1/17 for cycle #2 Rmini CHOP, upon admission patient febrile and chemotherapy was held for now.  Prior cultures reviewed, history of ESBL Klebsiella in Ucx 12/31/24, ESBL E.coli 11/29/24 Ucx     Viral URI d/t coronavirus (not COVID), treated for possible bacterial pneumonia  Persistent fevers, worsening hypoxia likely due to PJP   - 1/17 RVP with Coronavirus (229E,HKU1,NL63,OC43) detected, repeat 1/28 also positive -likely ongoing viral shedding   - 1/17 CT Chest with extensive new b/l ground glass nodular opacities, upper lobe dominant - possible pneumonia   - 1/17 CTAP decreased R hepatic mass since 12/9/24; known splenic mass; changes likely c/w retroperitoneal fibrosis   - s/p zosyn 1/17-1/20, escalated to ertapenem 1/20pm d/t fevers but fevers continued with worsening hypoxia   - 1/23 CTAP with no significant changes, diffuse erosive changes in sacrum with soft tissue infiltration similar to prior, low suspicion for OM given no open wound in area  - 1/23 CT Chest with diffuse b/l patchy ggo with central predominance increased from 1/17/25 - edema vs pneumonia; unchanged LLL round atelectasis; small L pleural effusion   -- c/f PCP based on above repeat CT, ongoing fevers, worsening hypoxia now requiring HFNC, LDH increased, and h/o of being on steroids without ppx and iso malignancy, noted vancomycin added overnight, s/p hydrocortisone 1/22-1/23   - IP eval appreciated, pt prefers to avoid bronch d/t risk of difficulty weaning from vent  - 1/17, 1/19, 1/21, 1/24 Bcx remain NGTD  - 1/24 Aspergillus ag negative   1/25 ua negative, EBV serology c/w past infection, CMV PCR negative    1/26 fungitell > 500 - c/w suspicion for PJP  1/29 afebrile >48h now, WBC stable, weaned off HFNC-now on NC, no cough  1/31 WBC trend noted-likely reactive, now on NC 2L, afebrile, overall much improved   2/1 WBC normalized, on NC 2L  2/2 noted with hyperkalemia likely d/t bactrim, changed to atovaquone     s/p zosyn 1/17-1/20  s/p ertapenem 1/20- 1/24  s/p vancomycin x1 on 1/24  s/p meropenem 1/24-1/26   s/p IV bactrim 1/24-1/31  s/p PO bactrim 1/31-2/2     Recommendations:   Continue Atovaquone 750mg PO Q12h to complete total 21d course (including bactrim) on 2/13  -then can trial on bactrim DS 1 tablet PO daily for ppx, if any electrolytes issues then will use atovaquone 1500mg daily  Continue Prednisone -- 20mg PO daily x11 days (2/3-2/13)  Nephrology following, monitor renal function/K   Continue on valtrex ppx  Monitor temps/CBC  Aspiration precautions  Wound care, offloading as able, nutrition  Continue rest of care per primary team       Risa Ac M.D.  [facility]  Available on [employer] TEAMS - *PREFERRED*  [phone]  After 5pm on weekdays and all day on weekends - please call [phone]     Thank you for consulting us and involving us in the management of this patients case. In addition to reviewing history, imaging, documents, labs, microbiology, took into account antibiotic stewardship, local antibiogram and infection control strategies and potential transmission issues. Patient is a 69 year old male with PMH of renal transplant 2012 (2/2 DM, s/p left nephrectomy), peripheral neuropathy, hypothyroidism, retroperitoneal fibrosis, HTN, HLD, GERD, anxiety/depression, ANGELIKA and recent admission at [facility] for ESBL E.coli urosepsis, imaging with ?sacral OM though pt with no sacral wound suspected findings likely related to mets, he was discharged on 12/18/24 to rehab, recently diagnosed DLBCL while admitted to [place] when he was noted to have hypercalcemia and liver masses s/p biopsy 12/16/24, now s/p R mini CHOP on 12/28 who was admitted 1/17 for cycle #2 Rmini CHOP, upon admission patient febrile and chemotherapy was held for now.  Prior cultures reviewed, history of ESBL Klebsiella in Ucx 12/31/24, ESBL E.coli 11/29/24 Ucx     Viral URI d/t coronavirus (not COVID), treated for possible bacterial pneumonia  Persistent fevers, worsening hypoxia likely due to PJP   - 1/17 RVP with Coronavirus (229E,HKU1,NL63,OC43) detected, repeat 1/28 also positive -likely ongoing viral shedding   - 1/17 CT Chest with extensive new b/l ground glass nodular opacities, upper lobe dominant - possible pneumonia   - 1/17 CTAP decreased R hepatic mass since 12/9/24; known splenic mass; changes likely c/w retroperitoneal fibrosis   - s/p zosyn 1/17-1/20, escalated to ertapenem 1/20pm d/t fevers but fevers continued with worsening hypoxia   - 1/23 CTAP with no significant changes, diffuse erosive changes in sacrum with soft tissue infiltration similar to prior, low suspicion for OM given no open wound in area  - 1/23 CT Chest with diffuse b/l patchy ggo with central predominance increased from 1/17/25 - edema vs pneumonia; unchanged LLL round atelectasis; small L pleural effusion   -- c/f PCP based on above repeat CT, ongoing fevers, worsening hypoxia now requiring HFNC, LDH increased, and h/o of being on steroids without ppx and iso malignancy, noted vancomycin added overnight, s/p hydrocortisone 1/22-1/23   - IP eval appreciated, pt prefers to avoid bronch d/t risk of difficulty weaning from vent  - 1/17, 1/19, 1/21, 1/24 Bcx remain NGTD  - 1/24 Aspergillus ag negative   1/25 ua negative, EBV serology c/w past infection, CMV PCR negative    1/26 fungitell > 500 - c/w suspicion for PJP  1/29 afebrile >48h now, WBC stable, weaned off HFNC-now on NC, no cough  1/31 WBC trend noted-likely reactive, now on NC 2L, afebrile, overall much improved   2/1 WBC normalized, on NC 2L  2/2 noted with hyperkalemia likely d/t bactrim, changed to atovaquone   2/4 potassium normalized, Cr stable, afebrile, on NC     s/p zosyn 1/17-1/20  s/p ertapenem 1/20- 1/24  s/p vancomycin x1 on 1/24  s/p meropenem 1/24-1/26   s/p IV bactrim 1/24-1/31  s/p PO bactrim 1/31-2/2     Recommendations:   Continue Atovaquone 750mg PO Q12h to complete total 21d course (including bactrim) on 2/13  -then can trial on bactrim DS 1 tablet PO daily for ppx, if any electrolytes issues then will use atovaquone 1500mg daily  Continue Prednisone -- 20mg PO daily x11 days (2/3-2/13)  Nephrology following, monitor renal function/K   Continue on valtrex ppx  Monitor temps/CBC  Aspiration precautions  Wound care, offloading as able, nutrition  Continue rest of care per primary team       Risa Ac M.D.  [facility]  Available on [employer] TEAMS - *PREFERRED*  [phone]  After 5pm on weekdays and all day on weekends - please call [phone]     Thank you for consulting us and involving us in the management of this patients case. In addition to reviewing history, imaging, documents, labs, microbiology, took into account antibiotic stewardship, local antibiogram and infection control strategies and potential transmission issues.

## 2025-02-04 NOTE — PROGRESS NOTE ADULT - PROBLEM SELECTOR PLAN 5
AT RISK FOR ADRENAL INSUFFIENCEY IF HYPOTENSIVE 50mg hydrocortisone IV q8  s/p Hydrocortisone 25 mg IV, now off--on pred taper for PJP PNA  1/26 D/C Tacrolimus and Lisinopril due to DIMITRIOS and PCP.   Transplant nephro / Transplant ID following    UOP improved after IVF  K improved to 5.2 AT RISK FOR ADRENAL INSUFFIENCEY IF HYPOTENSIVE 50mg hydrocortisone IV q8  s/p Hydrocortisone 25 mg IV, now off--on pred taper for PJP PNA  1/26 D/C Tacrolimus and Lisinopril due to DIMITRIOS and PCP.   Transplant nephro / Transplant ID following    UOP improved after IVF  K improved to 4.0 today 2/4

## 2025-02-04 NOTE — PROGRESS NOTE ADULT - SUBJECTIVE AND OBJECTIVE BOX
[facility]  WANG Mccoy Y. Patel, S. Shah, G. Casimir  [phone]  ([phone] - weekdays after 5pm and weekends)    Name: JAN CALVIN  Age/Gender: 69y Male  MRN: [id number]    Interval History:  Patient seen and examined this morning.   No new complaints noted.  Notes reviewed  No concerning overnight events  Afebrile   Allergies: No Known Allergies      Objective:  Vitals:   T(F): 98.3 (02-04-25 @ 05:38), Max: 98.6 (02-03-25 @ 12:30)  HR: 68 (02-04-25 @ 05:38) (68 - 75)  BP: 118/60 (02-04-25 @ 05:38) (110/62 - 126/67)  RR: 18 (02-04-25 @ 05:38) (18 - 20)  SpO2: 95% (02-04-25 @ 05:38) (94% - 97%)  Physical Examination:  General: no acute distress, NC, nontoxic appearing   HEENT: normocephalic, atraumatic, anicteric  Respiratory: no acc muscle use, breathing comfortably  Cardiovascular: S1 and S2 present  Gastrointestinal: normal appearing, nondistended  Extremities: no edema, no cyanosis  Skin: no visible rash    Laboratory Studies:  CBC:                       9.0    8.60  )-----------( 298      ( 04 Feb 2025 07:41 )             29.3     WBC Trend:  8.60 02-04-25 @ 07:41  9.27 02-03-25 @ 07:14  9.08 02-02-25 @ 08:38  8.99 02-01-25 @ 06:55  13.05 01-31-25 @ 07:10  12.21 01-30-25 @ 07:05  10.78 01-29-25 @ 05:02    CMP: 02-04    139  |  101  |  32[H]  ----------------------------<  180[H]  4.0   |  25  |  0.94    Ca    9.9      04 Feb 2025 07:41  Phos  2.3     02-04  Mg     1.7     02-04    TPro  5.8[L]  /  Alb  2.8[L]  /  TBili  0.4  /  DBili  x   /  AST  23  /  ALT  25  /  AlkPhos  94  02-04    Creatinine: 0.94 mg/dL (02-04-25 @ 07:41)  Creatinine: 0.85 mg/dL (02-03-25 @ 07:15)  Creatinine: 0.92 mg/dL (02-02-25 @ 22:19)  Creatinine: 0.86 mg/dL (02-02-25 @ 17:55)  Creatinine: 0.75 mg/dL (02-02-25 @ 13:39)  Creatinine: 0.80 mg/dL (02-02-25 @ 08:38)  Creatinine: 0.84 mg/dL (02-01-25 @ 06:54)  Creatinine: 0.98 mg/dL (01-31-25 @ 07:11)  Creatinine: 0.95 mg/dL (01-30-25 @ 07:06)  Creatinine: 1.05 mg/dL (01-29-25 @ 05:02)    LIVER FUNCTIONS - ( 04 Feb 2025 07:41 )  Alb: 2.8 g/dL / Pro: 5.8 g/dL / ALK PHOS: 94 U/L / ALT: 25 U/L / AST: 23 U/L / GGT: x           Microbiology: reviewed   Culture - Blood (collected 01-24-25 @ 00:46)  Source: .Blood BLOOD  Final Report (01-29-25 @ 04:00):    No growth at 5 days    Culture - Blood (collected 01-24-25 @ 00:18)  Source: .Blood BLOOD  Final Report (01-29-25 @ 04:00):    No growth at 5 days    Culture - Urine (collected 01-22-25 @ 05:05)  Source: Clean Catch Clean Catch (Midstream)  Final Report (01-24-25 @ 13:31):    10,000 - 49,000 CFU/mL Candida albicans    "Susceptibilities not performed"    Culture - Blood (collected 01-21-25 @ 22:25)  Source: .Blood BLOOD  Final Report (01-27-25 @ 03:00):    No growth at 5 days    Culture - Blood (collected 01-21-25 @ 22:07)  Source: .Blood BLOOD  Final Report (01-27-25 @ 03:00):    No growth at 5 days    Radiology: reviewed     Medications:  acetaminophen     Tablet .. 650 milliGRAM(s) Oral every 6 hours PRN  amLODIPine   Tablet 10 milliGRAM(s) Oral daily  artificial tears (preservative free) Ophthalmic Solution 1 Drop(s) Both EYES every 6 hours  atovaquone  Suspension 750 milliGRAM(s) Oral every 12 hours  bisacodyl Suppository 10 milliGRAM(s) Rectal once PRN  buPROPion XL (24-Hour) . 300 milliGRAM(s) Oral daily  carvedilol 12.5 milliGRAM(s) Oral every 12 hours  chlorhexidine 4% Liquid 1 Application(s) Topical <User Schedule>  cloNIDine 0.1 milliGRAM(s) Oral three times a day  dextrose 5%. 1000 milliLiter(s) IV Continuous <Continuous>  dextrose 5%. 1000 milliLiter(s) IV Continuous <Continuous>  dextrose 50% Injectable 25 Gram(s) IV Push once  dextrose Oral Gel 15 Gram(s) Oral once PRN  enoxaparin Injectable 40 milliGRAM(s) SubCutaneous <User Schedule>  escitalopram 10 milliGRAM(s) Oral daily  glucagon  Injectable 1 milliGRAM(s) IntraMuscular once  hydrALAZINE 100 milliGRAM(s) Oral every 8 hours  insulin glargine Injectable (LANTUS) 6 Unit(s) SubCutaneous at bedtime  insulin lispro (ADMELOG) corrective regimen sliding scale   SubCutaneous three times a day before meals  insulin lispro (ADMELOG) corrective regimen sliding scale   SubCutaneous <User Schedule>  insulin lispro Injectable (ADMELOG) 14 Unit(s) SubCutaneous before breakfast  insulin lispro Injectable (ADMELOG) 14 Unit(s) SubCutaneous before lunch  insulin lispro Injectable (ADMELOG) 10 Unit(s) SubCutaneous with dinner  lactulose Syrup 15 Gram(s) Oral every 8 hours  levothyroxine 88 MICROGram(s) Oral daily  pantoprazole    Tablet 40 milliGRAM(s) Oral before breakfast  polyethylene glycol 3350 17 Gram(s) Oral every 12 hours  predniSONE   Tablet 20 milliGRAM(s) Oral daily  rosuvastatin 10 milliGRAM(s) Oral at bedtime  senna 2 Tablet(s) Oral at bedtime  sodium chloride 0.9% lock flush 10 milliLiter(s) IV Push every 1 hour PRN  sodium zirconium cyclosilicate 10 Gram(s) Oral every 8 hours  valACYclovir 500 milliGRAM(s) Oral every 12 hours    Current Antimicrobials:  atovaquone  Suspension 750 milliGRAM(s) Oral every 12 hours  valACYclovir 500 milliGRAM(s) Oral every 12 hours    Prior/Completed Antimicrobials:  ertapenem  IVPB  piperacillin/tazobactam IVPB.  piperacillin/tazobactam IVPB.-  vancomycin  IVPB

## 2025-02-04 NOTE — PROGRESS NOTE ADULT - SUBJECTIVE AND OBJECTIVE BOX
Mona Zacarias MD  PGY 3 Department of Internal Medicine        Patient is a 69y old  Male who presents with a chief complaint of "For chemo" (03 Feb 2025 12:01)      SUBJECTIVE / OVERNIGHT EVENTS: Pt seen and examined. No acute overnight events. Denies fevers, chills, CP, SOB, Abdominal pain, N/V, Constipation, Diarrhea        MEDICATIONS  (STANDING):  amLODIPine   Tablet 10 milliGRAM(s) Oral daily  artificial tears (preservative free) Ophthalmic Solution 1 Drop(s) Both EYES every 6 hours  atovaquone  Suspension 750 milliGRAM(s) Oral every 12 hours  buPROPion XL (24-Hour) . 300 milliGRAM(s) Oral daily  carvedilol 12.5 milliGRAM(s) Oral every 12 hours  chlorhexidine 4% Liquid 1 Application(s) Topical <User Schedule>  cloNIDine 0.1 milliGRAM(s) Oral three times a day  dextrose 5%. 1000 milliLiter(s) (50 mL/Hr) IV Continuous <Continuous>  dextrose 5%. 1000 milliLiter(s) (100 mL/Hr) IV Continuous <Continuous>  dextrose 50% Injectable 25 Gram(s) IV Push once  enoxaparin Injectable 40 milliGRAM(s) SubCutaneous <User Schedule>  escitalopram 10 milliGRAM(s) Oral daily  glucagon  Injectable 1 milliGRAM(s) IntraMuscular once  hydrALAZINE 100 milliGRAM(s) Oral every 8 hours  insulin glargine Injectable (LANTUS) 6 Unit(s) SubCutaneous at bedtime  insulin lispro (ADMELOG) corrective regimen sliding scale   SubCutaneous <User Schedule>  insulin lispro (ADMELOG) corrective regimen sliding scale   SubCutaneous three times a day before meals  insulin lispro Injectable (ADMELOG) 14 Unit(s) SubCutaneous before breakfast  insulin lispro Injectable (ADMELOG) 14 Unit(s) SubCutaneous before lunch  insulin lispro Injectable (ADMELOG) 10 Unit(s) SubCutaneous with dinner  lactulose Syrup 15 Gram(s) Oral every 8 hours  levothyroxine 88 MICROGram(s) Oral daily  pantoprazole    Tablet 40 milliGRAM(s) Oral before breakfast  polyethylene glycol 3350 17 Gram(s) Oral every 12 hours  predniSONE   Tablet 20 milliGRAM(s) Oral daily  rosuvastatin 10 milliGRAM(s) Oral at bedtime  senna 2 Tablet(s) Oral at bedtime  sodium zirconium cyclosilicate 10 Gram(s) Oral every 8 hours  valACYclovir 500 milliGRAM(s) Oral every 12 hours    MEDICATIONS  (PRN):  acetaminophen     Tablet .. 650 milliGRAM(s) Oral every 6 hours PRN Temp greater or equal to 38C (100.4F), Mild Pain (1 - 3)  bisacodyl Suppository 10 milliGRAM(s) Rectal once PRN Constipation  dextrose Oral Gel 15 Gram(s) Oral once PRN Blood Glucose LESS THAN 70 milliGRAM(s)/deciliter  sodium chloride 0.9% lock flush 10 milliLiter(s) IV Push every 1 hour PRN Pre/post blood products, medications, blood draw, and to maintain line patency      I&O's Summary    03 Feb 2025 07:01  -  04 Feb 2025 07:00  --------------------------------------------------------  IN: 1520 mL / OUT: 2850 mL / NET: -1330 mL        Vital Signs Last 24 Hrs  T(C): 36.8 (04 Feb 2025 05:38), Max: 37 (03 Feb 2025 12:30)  T(F): 98.3 (04 Feb 2025 05:38), Max: 98.6 (03 Feb 2025 12:30)  HR: 68 (04 Feb 2025 05:38) (68 - 75)  BP: 118/60 (04 Feb 2025 05:38) (110/62 - 126/67)  BP(mean): --  RR: 18 (04 Feb 2025 05:38) (18 - 20)  SpO2: 95% (04 Feb 2025 05:38) (93% - 97%)    Parameters below as of 04 Feb 2025 05:38  Patient On (Oxygen Delivery Method): nasal cannula  O2 Flow (L/min): 2      CAPILLARY BLOOD GLUCOSE      POCT Blood Glucose.: 326 mg/dL (04 Feb 2025 01:19)  POCT Blood Glucose.: 391 mg/dL (03 Feb 2025 21:34)  POCT Blood Glucose.: 203 mg/dL (03 Feb 2025 17:21)  POCT Blood Glucose.: 159 mg/dL (03 Feb 2025 12:03)  POCT Blood Glucose.: 142 mg/dL (03 Feb 2025 08:29)      PHYSICAL EXAM:  GENERAL: NAD, lying in bed comfortably  HEAD:  Atraumatic, normocephalic  EYES: EOMI, PERRLA, conjunctiva clear, no conjunctival pallor, anicteric sclera  NECK: Supple, trachea midline, no JVD  HEART: Regular rate and rhythm, Normal S1 S2, no murmurs, rubs, or gallops  LUNGS: Unlabored respirations. Clear to ascultation bilaterally, no crackles, wheezing, or rhonchi  ABDOMEN: Soft, nondistended, nontender, no rebound or guarding, bowel sounds presents  EXTREMITIES: Warm extremities, no clubbing, cyanosis, or edema, peripheral pulses 2+ bilaterally  MUSCULOSKELETAL: No joint swelling or tenderness to palpation  NEURO: CN 2-12 grossly intact, moves all limbs spontaneously  SKIN: No rashes or lesions         LABS:                        8.6    9.27  )-----------( 296      ( 03 Feb 2025 07:14 )             27.6     Auto Eosinophil # 0.16  / Auto Eosinophil % 1.7   / Auto Neutrophil # 7.88  / Auto Neutrophil % 85.0  / BANDS % x                            8.6    9.08  )-----------( 332      ( 02 Feb 2025 08:38 )             27.9     Auto Eosinophil # 0.03  / Auto Eosinophil % 0.3   / Auto Neutrophil # 7.93  / Auto Neutrophil % 87.3  / BANDS % x        02-03    135  |  99  |  30[H]  ----------------------------<  99  5.1   |  26  |  0.85  02-02    132[L]  |  99  |  33[H]  ----------------------------<  210[H]  5.6[H]   |  25  |  0.92  02-02    132[L]  |  99  |  32[H]  ----------------------------<  283[H]  6.1[H]   |  24  |  0.86    Ca    10.4      03 Feb 2025 07:15  Mg     1.7     02-03  Phos  2.6     02-03  TPro  5.7[L]  /  Alb  2.7[L]  /  TBili  0.3  /  DBili  x   /  AST  25  /  ALT  24  /  AlkPhos  86  02-03  TPro  5.6[L]  /  Alb  2.7[L]  /  TBili  0.2  /  DBili  x   /  AST  24  /  ALT  22  /  AlkPhos  85  02-02          Urinalysis Basic - ( 03 Feb 2025 07:15 )    Color: x / Appearance: x / SG: x / pH: x  Gluc: 99 mg/dL / Ketone: x  / Bili: x / Urobili: x   Blood: x / Protein: x / Nitrite: x   Leuk Esterase: x / RBC: x / WBC x   Sq Epi: x / Non Sq Epi: x / Bacteria: x            RADIOLOGY & ADDITIONAL TESTS:    Imaging Personally Reviewed:    Consultant(s) Notes Reviewed:      Care Discussed with Consultants/Other Providers:   Mona Zacarias MD  PGY 3 Department of Internal Medicine        Patient is a 69y old  Male who presents with a chief complaint of "For chemo" (03 Feb 2025 12:01)      SUBJECTIVE / OVERNIGHT EVENTS: Pt seen and examined. No acute overnight events. Denies fevers, chills, CP, SOB, Abdominal pain, N/V, Constipation, Diarrhea        MEDICATIONS  (STANDING):  amLODIPine   Tablet 10 milliGRAM(s) Oral daily  artificial tears (preservative free) Ophthalmic Solution 1 Drop(s) Both EYES every 6 hours  atovaquone  Suspension 750 milliGRAM(s) Oral every 12 hours  buPROPion XL (24-Hour) . 300 milliGRAM(s) Oral daily  carvedilol 12.5 milliGRAM(s) Oral every 12 hours  chlorhexidine 4% Liquid 1 Application(s) Topical <User Schedule>  cloNIDine 0.1 milliGRAM(s) Oral three times a day  dextrose 5%. 1000 milliLiter(s) (50 mL/Hr) IV Continuous <Continuous>  dextrose 5%. 1000 milliLiter(s) (100 mL/Hr) IV Continuous <Continuous>  dextrose 50% Injectable 25 Gram(s) IV Push once  enoxaparin Injectable 40 milliGRAM(s) SubCutaneous <User Schedule>  escitalopram 10 milliGRAM(s) Oral daily  glucagon  Injectable 1 milliGRAM(s) IntraMuscular once  hydrALAZINE 100 milliGRAM(s) Oral every 8 hours  insulin glargine Injectable (LANTUS) 6 Unit(s) SubCutaneous at bedtime  insulin lispro (ADMELOG) corrective regimen sliding scale   SubCutaneous <User Schedule>  insulin lispro (ADMELOG) corrective regimen sliding scale   SubCutaneous three times a day before meals  insulin lispro Injectable (ADMELOG) 14 Unit(s) SubCutaneous before breakfast  insulin lispro Injectable (ADMELOG) 14 Unit(s) SubCutaneous before lunch  insulin lispro Injectable (ADMELOG) 10 Unit(s) SubCutaneous with dinner  lactulose Syrup 15 Gram(s) Oral every 8 hours  levothyroxine 88 MICROGram(s) Oral daily  pantoprazole    Tablet 40 milliGRAM(s) Oral before breakfast  polyethylene glycol 3350 17 Gram(s) Oral every 12 hours  predniSONE   Tablet 20 milliGRAM(s) Oral daily  rosuvastatin 10 milliGRAM(s) Oral at bedtime  senna 2 Tablet(s) Oral at bedtime  sodium zirconium cyclosilicate 10 Gram(s) Oral every 8 hours  valACYclovir 500 milliGRAM(s) Oral every 12 hours    MEDICATIONS  (PRN):  acetaminophen     Tablet .. 650 milliGRAM(s) Oral every 6 hours PRN Temp greater or equal to 38C (100.4F), Mild Pain (1 - 3)  bisacodyl Suppository 10 milliGRAM(s) Rectal once PRN Constipation  dextrose Oral Gel 15 Gram(s) Oral once PRN Blood Glucose LESS THAN 70 milliGRAM(s)/deciliter  sodium chloride 0.9% lock flush 10 milliLiter(s) IV Push every 1 hour PRN Pre/post blood products, medications, blood draw, and to maintain line patency      I&O's Summary    03 Feb 2025 07:01  -  04 Feb 2025 07:00  --------------------------------------------------------  IN: 1520 mL / OUT: 2850 mL / NET: -1330 mL        Vital Signs Last 24 Hrs  T(C): 36.8 (04 Feb 2025 05:38), Max: 37 (03 Feb 2025 12:30)  T(F): 98.3 (04 Feb 2025 05:38), Max: 98.6 (03 Feb 2025 12:30)  HR: 68 (04 Feb 2025 05:38) (68 - 75)  BP: 118/60 (04 Feb 2025 05:38) (110/62 - 126/67)  BP(mean): --  RR: 18 (04 Feb 2025 05:38) (18 - 20)  SpO2: 95% (04 Feb 2025 05:38) (93% - 97%)    Parameters below as of 04 Feb 2025 05:38  Patient On (Oxygen Delivery Method): nasal cannula  O2 Flow (L/min): 2      CAPILLARY BLOOD GLUCOSE      POCT Blood Glucose.: 326 mg/dL (04 Feb 2025 01:19)  POCT Blood Glucose.: 391 mg/dL (03 Feb 2025 21:34)  POCT Blood Glucose.: 203 mg/dL (03 Feb 2025 17:21)  POCT Blood Glucose.: 159 mg/dL (03 Feb 2025 12:03)  POCT Blood Glucose.: 142 mg/dL (03 Feb 2025 08:29)      PHYSICAL EXAM:  GENERAL: NAD, lying in bed comfortably  HEAD:  Atraumatic, normocephalic  EYES: EOMI, PERRLA, conjunctiva clear, no conjunctival pallor, anicteric sclera  NECK: Supple, trachea midline, no JVD  HEART: Regular rate and rhythm, Normal S1 S2, no murmurs, rubs, or gallops  LUNGS: Unlabored respirations. Clear to ascultation bilaterally, no crackles, wheezing, or rhonchi  ABDOMEN: Soft, nondistended, nontender, no rebound or guarding, bowel sounds presents  EXTREMITIES: Warm extremities, no clubbing, cyanosis, or edema, peripheral pulses 2+ bilaterally  MUSCULOSKELETAL: No joint swelling or tenderness to palpation  NEURO: CN 2-12 grossly intact, moves all limbs spontaneously  SKIN: No rashes or lesions. Sterile dressing over sacral pressure wound.          LABS:                        8.6    9.27  )-----------( 296      ( 03 Feb 2025 07:14 )             27.6     Auto Eosinophil # 0.16  / Auto Eosinophil % 1.7   / Auto Neutrophil # 7.88  / Auto Neutrophil % 85.0  / BANDS % x                            8.6    9.08  )-----------( 332      ( 02 Feb 2025 08:38 )             27.9     Auto Eosinophil # 0.03  / Auto Eosinophil % 0.3   / Auto Neutrophil # 7.93  / Auto Neutrophil % 87.3  / BANDS % x        02-03    135  |  99  |  30[H]  ----------------------------<  99  5.1   |  26  |  0.85  02-02    132[L]  |  99  |  33[H]  ----------------------------<  210[H]  5.6[H]   |  25  |  0.92  02-02    132[L]  |  99  |  32[H]  ----------------------------<  283[H]  6.1[H]   |  24  |  0.86    Ca    10.4      03 Feb 2025 07:15  Mg     1.7     02-03  Phos  2.6     02-03  TPro  5.7[L]  /  Alb  2.7[L]  /  TBili  0.3  /  DBili  x   /  AST  25  /  ALT  24  /  AlkPhos  86  02-03  TPro  5.6[L]  /  Alb  2.7[L]  /  TBili  0.2  /  DBili  x   /  AST  24  /  ALT  22  /  AlkPhos  85  02-02          Urinalysis Basic - ( 03 Feb 2025 07:15 )    Color: x / Appearance: x / SG: x / pH: x  Gluc: 99 mg/dL / Ketone: x  / Bili: x / Urobili: x   Blood: x / Protein: x / Nitrite: x   Leuk Esterase: x / RBC: x / WBC x   Sq Epi: x / Non Sq Epi: x / Bacteria: x            RADIOLOGY & ADDITIONAL TESTS:    Imaging Personally Reviewed:    Consultant(s) Notes Reviewed:      Care Discussed with Consultants/Other Providers:

## 2025-02-04 NOTE — PROGRESS NOTE ADULT - PROBLEM SELECTOR PLAN 1
INPATIENT PLAN:  - Check BG TID AC, HS, and 2AM  - Continue Lantus 6u QHS  - Continue Admelog to 14u with breakfast, 14u with lunch, and 10u with dinner (HOLD if NPO or steroid held)  - C/w moderate dose Admelog correctional scales TID AC, HS, and 2AM  - Please keep hypoglycemia protocol in place     DISCHARGE PLANNING:  - Discharge recs pending clinical course and depending on steroids. Will need basal/bolus (doses TBD).   - Endocrine follow up: can f/u wit his Endocrinologist Dr. Romero.   - Recommend routine outpatient ophthalmology and podiatry follow up.

## 2025-02-04 NOTE — PROGRESS NOTE ADULT - SUBJECTIVE AND OBJECTIVE BOX
Chief Complaint: Diabetes Mellitus follow up    INTERVAL HX:  Patient seen at bedside.    Reports eating well, finishes most  food on each tray. He did eat suly crackers last night at bedtime  which caused bedtime and overnight hyperglycemia.   BG over the last 24 hrs have been variable from 110-391mg/dl.   Goal range 100-180mg/dl. No hypoglycemia.     Review of Systems:  General: As above  GI: No nausea, vomiting  Endocrine: no  S&Sx of hypoglycemia    Allergies    No Known Allergies    Intolerances      MEDICATIONS  (STANDING):  amLODIPine   Tablet 10 milliGRAM(s) Oral daily  artificial tears (preservative free) Ophthalmic Solution 1 Drop(s) Both EYES every 6 hours  atovaquone  Suspension 750 milliGRAM(s) Oral every 12 hours  buPROPion XL (24-Hour) . 300 milliGRAM(s) Oral daily  carvedilol 12.5 milliGRAM(s) Oral every 12 hours  chlorhexidine 4% Liquid 1 Application(s) Topical <User Schedule>  cloNIDine 0.1 milliGRAM(s) Oral three times a day  dextrose 5%. 1000 milliLiter(s) (50 mL/Hr) IV Continuous <Continuous>  dextrose 5%. 1000 milliLiter(s) (100 mL/Hr) IV Continuous <Continuous>  dextrose 50% Injectable 25 Gram(s) IV Push once  enoxaparin Injectable 40 milliGRAM(s) SubCutaneous <User Schedule>  escitalopram 10 milliGRAM(s) Oral daily  glucagon  Injectable 1 milliGRAM(s) IntraMuscular once  hydrALAZINE 100 milliGRAM(s) Oral every 8 hours  insulin glargine Injectable (LANTUS) 6 Unit(s) SubCutaneous at bedtime  insulin lispro (ADMELOG) corrective regimen sliding scale   SubCutaneous <User Schedule>  insulin lispro (ADMELOG) corrective regimen sliding scale   SubCutaneous three times a day before meals  insulin lispro Injectable (ADMELOG) 14 Unit(s) SubCutaneous before breakfast  insulin lispro Injectable (ADMELOG) 14 Unit(s) SubCutaneous before lunch  insulin lispro Injectable (ADMELOG) 10 Unit(s) SubCutaneous with dinner  lactulose Syrup 15 Gram(s) Oral every 8 hours  levothyroxine 88 MICROGram(s) Oral daily  pantoprazole    Tablet 40 milliGRAM(s) Oral before breakfast  polyethylene glycol 3350 17 Gram(s) Oral every 12 hours  predniSONE   Tablet 20 milliGRAM(s) Oral daily  rosuvastatin 10 milliGRAM(s) Oral at bedtime  senna 2 Tablet(s) Oral at bedtime  sodium zirconium cyclosilicate 10 Gram(s) Oral every 8 hours  valACYclovir 500 milliGRAM(s) Oral every 12 hours        insulin glargine Injectable (LANTUS)   6 Unit(s) SubCutaneous (02-03-25 @ 22:11)    insulin lispro (ADMELOG) corrective regimen sliding scale   4 Unit(s) SubCutaneous (02-04-25 @ 01:23)   6 Unit(s) SubCutaneous (02-03-25 @ 22:12)    insulin lispro (ADMELOG) corrective regimen sliding scale   4 Unit(s) SubCutaneous (02-04-25 @ 13:20)   4 Unit(s) SubCutaneous (02-04-25 @ 09:25)   4 Unit(s) SubCutaneous (02-03-25 @ 17:32)    insulin lispro Injectable (ADMELOG)   14 Unit(s) SubCutaneous (02-04-25 @ 13:24)    insulin lispro Injectable (ADMELOG)   10 Unit(s) SubCutaneous (02-03-25 @ 17:32)    insulin lispro Injectable (ADMELOG)   14 Unit(s) SubCutaneous (02-04-25 @ 09:25)    levothyroxine   88 MICROGram(s) Oral (02-04-25 @ 06:18)    predniSONE   Tablet   20 milliGRAM(s) Oral (02-04-25 @ 06:17)    rosuvastatin   10 milliGRAM(s) Oral (02-03-25 @ 22:11)        PHYSICAL EXAM:  VITALS: T(C): 36.9 (02-04-25 @ 09:00)  T(F): 98.5 (02-04-25 @ 09:00), Max: 98.6 (02-03-25 @ 17:12)  HR: 70 (02-04-25 @ 09:00) (68 - 73)  BP: 127/63 (02-04-25 @ 09:00) (110/62 - 127/63)  RR:  (18 - 20)  SpO2:  (94% - 98%)  Wt(kg): --  GENERAL: NAD  Respiratory: Respirations unlabored   Extremities: Warm, no edema  NEURO: Alert , appropriate     LABS:  POCT Blood Glucose.: 212 mg/dL (02-04-25 @ 09:01)  POCT Blood Glucose.: 326 mg/dL (02-04-25 @ 01:19)  POCT Blood Glucose.: 391 mg/dL (02-03-25 @ 21:34)  POCT Blood Glucose.: 203 mg/dL (02-03-25 @ 17:21)  POCT Blood Glucose.: 159 mg/dL (02-03-25 @ 12:03)  POCT Blood Glucose.: 142 mg/dL (02-03-25 @ 08:29)  POCT Blood Glucose.: 110 mg/dL (02-03-25 @ 03:18)  POCT Blood Glucose.: 159 mg/dL (02-02-25 @ 22:50)  POCT Blood Glucose.: 213 mg/dL (02-02-25 @ 21:03)  POCT Blood Glucose.: 279 mg/dL (02-02-25 @ 20:11)  POCT Blood Glucose.: 253 mg/dL (02-02-25 @ 18:43)  POCT Blood Glucose.: 298 mg/dL (02-02-25 @ 17:13)  POCT Blood Glucose.: 210 mg/dL (02-02-25 @ 13:42)  POCT Blood Glucose.: 293 mg/dL (02-02-25 @ 11:51)  POCT Blood Glucose.: 377 mg/dL (02-02-25 @ 10:41)  POCT Blood Glucose.: 307 mg/dL (02-02-25 @ 09:39)  POCT Blood Glucose.: 301 mg/dL (02-02-25 @ 08:33)  POCT Blood Glucose.: 246 mg/dL (02-02-25 @ 06:36)  POCT Blood Glucose.: 275 mg/dL (02-02-25 @ 01:48)  POCT Blood Glucose.: 392 mg/dL (02-01-25 @ 21:45)  POCT Blood Glucose.: 364 mg/dL (02-01-25 @ 17:34)                          9.0    8.60  )-----------( 298      ( 04 Feb 2025 07:41 )             29.3     02-04    139  |  101  |  32[H]  ----------------------------<  180[H]  4.0   |  25  |  0.94    Ca    9.9      04 Feb 2025 07:41  Phos  2.3     02-04  Mg     1.7     02-04    TPro  5.8[L]  /  Alb  2.8[L]  /  TBili  0.4  /  DBili  x   /  AST  23  /  ALT  25  /  AlkPhos  94  02-04    eGFR: 89 mL/min/1.73m2 (04 Feb 2025 07:41)      Thyroid Function Tests:      A1C with Estimated Average Glucose Result: 8.4 % (01-28-25 @ 07:38)  A1C with Estimated Average Glucose Result: 7.8 % (12-29-24 @ 07:07)  A1C with Estimated Average Glucose Result: 7.7 % (12-28-24 @ 10:06)  A1C with Estimated Average Glucose Result: 7.4 % (11-30-24 @ 06:40)    Estimated Average Glucose: 194 mg/dL (01-28-25 @ 07:38)  Estimated Average Glucose: 177 mg/dL (12-29-24 @ 07:07)  Estimated Average Glucose: 174 mg/dL (12-28-24 @ 10:06)  Estimated Average Glucose: 166 mg/dL (11-30-24 @ 06:40)        Diet, Consistent Carbohydrate/No Snacks:   Supplement Feeding Modality:  Oral  Glucerna Shake Cans or Servings Per Day:  1       Frequency:  Daily (01-26-25 @ 23:00) [Active]

## 2025-02-04 NOTE — PROGRESS NOTE ADULT - ASSESSMENT
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 Inpatient chemotherapy on hold for now.   Endocrinology consulted for hyperglycemia. Patient feels well, eating full meals and tolerating POs. FBG slightly elevated this morning, no hypoglycemia. Lantus dose was decreased last night as prednisone was decreased from 40-20mg daily.  BG was elevated to 300s at bedtime and overnight as patient ate suly crackers.  States he doesn't always have a snack at night.   Endocrine will closely monitor BG and adjust insulin as needed for BG goal 100-180mg/dL inpatient.     #Uncontrolled Type 2 Diabetes Mellitus with  #Steroid-induced hyperglycemia  - Follows with: Dr. Romero  - A1C with Estimated Average Glucose Result: 7.8 % (12-29-24)  - home regimen: Came from rehab, there he was on Lantus 38 units, Admelog 10 units with correction scale ( as per pt, he usually get Lantus 10-15 units BID and premeal insulin per sliding scale; usually 5-6 units at home, and uses Dexcom G6 with reader )   - eGFR: 70 mL/min/1.73m2 (01-26-25)  - glucose grossly elevated, no evidence of DKA on labs    Steroid schedule   Prednisone 40 mg BID  (1/24-1/28)  Prednisone 40 mg daily (1/29-2/2)  Prednisone 20 mg daily (2/3- 2/14)

## 2025-02-05 ENCOUNTER — TRANSCRIPTION ENCOUNTER (OUTPATIENT)
Age: 70
End: 2025-02-05

## 2025-02-05 LAB
ANION GAP SERPL CALC-SCNC: 11 MMOL/L — SIGNIFICANT CHANGE UP (ref 5–17)
BUN SERPL-MCNC: 37 MG/DL — HIGH (ref 7–23)
CALCIUM SERPL-MCNC: 9.8 MG/DL — SIGNIFICANT CHANGE UP (ref 8.4–10.5)
CHLORIDE SERPL-SCNC: 104 MMOL/L — SIGNIFICANT CHANGE UP (ref 96–108)
CO2 SERPL-SCNC: 26 MMOL/L — SIGNIFICANT CHANGE UP (ref 22–31)
CREAT SERPL-MCNC: 0.77 MG/DL — SIGNIFICANT CHANGE UP (ref 0.5–1.3)
EGFR: 98 ML/MIN/1.73M2 — SIGNIFICANT CHANGE UP
GLUCOSE BLDC GLUCOMTR-MCNC: 147 MG/DL — HIGH (ref 70–99)
GLUCOSE BLDC GLUCOMTR-MCNC: 174 MG/DL — HIGH (ref 70–99)
GLUCOSE BLDC GLUCOMTR-MCNC: 187 MG/DL — HIGH (ref 70–99)
GLUCOSE BLDC GLUCOMTR-MCNC: 287 MG/DL — HIGH (ref 70–99)
GLUCOSE BLDC GLUCOMTR-MCNC: 325 MG/DL — HIGH (ref 70–99)
GLUCOSE BLDC GLUCOMTR-MCNC: 330 MG/DL — HIGH (ref 70–99)
GLUCOSE BLDC GLUCOMTR-MCNC: 384 MG/DL — HIGH (ref 70–99)
GLUCOSE BLDC GLUCOMTR-MCNC: 428 MG/DL — HIGH (ref 70–99)
GLUCOSE BLDC GLUCOMTR-MCNC: 431 MG/DL — HIGH (ref 70–99)
GLUCOSE BLDC GLUCOMTR-MCNC: 89 MG/DL — SIGNIFICANT CHANGE UP (ref 70–99)
GLUCOSE BLDC GLUCOMTR-MCNC: 90 MG/DL — SIGNIFICANT CHANGE UP (ref 70–99)
GLUCOSE SERPL-MCNC: 265 MG/DL — HIGH (ref 70–99)
HCT VFR BLD CALC: 29.2 % — LOW (ref 39–50)
HGB BLD-MCNC: 9.1 G/DL — LOW (ref 13–17)
MAGNESIUM SERPL-MCNC: 2 MG/DL — SIGNIFICANT CHANGE UP (ref 1.6–2.6)
MCHC RBC-ENTMCNC: 30.1 PG — SIGNIFICANT CHANGE UP (ref 27–34)
MCHC RBC-ENTMCNC: 31.2 G/DL — LOW (ref 32–36)
MCV RBC AUTO: 96.7 FL — SIGNIFICANT CHANGE UP (ref 80–100)
NRBC # BLD: 0 /100 WBCS — SIGNIFICANT CHANGE UP (ref 0–0)
NRBC BLD-RTO: 0 /100 WBCS — SIGNIFICANT CHANGE UP (ref 0–0)
PHOSPHATE SERPL-MCNC: 2.9 MG/DL — SIGNIFICANT CHANGE UP (ref 2.5–4.5)
PLATELET # BLD AUTO: 256 K/UL — SIGNIFICANT CHANGE UP (ref 150–400)
POTASSIUM SERPL-MCNC: 4.4 MMOL/L — SIGNIFICANT CHANGE UP (ref 3.5–5.3)
POTASSIUM SERPL-SCNC: 4.4 MMOL/L — SIGNIFICANT CHANGE UP (ref 3.5–5.3)
RBC # BLD: 3.02 M/UL — LOW (ref 4.2–5.8)
RBC # FLD: 19.9 % — HIGH (ref 10.3–14.5)
SODIUM SERPL-SCNC: 141 MMOL/L — SIGNIFICANT CHANGE UP (ref 135–145)
WBC # BLD: 7.88 K/UL — SIGNIFICANT CHANGE UP (ref 3.8–10.5)
WBC # FLD AUTO: 7.88 K/UL — SIGNIFICANT CHANGE UP (ref 3.8–10.5)

## 2025-02-05 PROCEDURE — 99232 SBSQ HOSP IP/OBS MODERATE 35: CPT

## 2025-02-05 PROCEDURE — 99232 SBSQ HOSP IP/OBS MODERATE 35: CPT | Mod: FS

## 2025-02-05 PROCEDURE — 99232 SBSQ HOSP IP/OBS MODERATE 35: CPT | Mod: GC

## 2025-02-05 RX ORDER — INSULIN LISPRO 100/ML
14 VIAL (ML) SUBCUTANEOUS
Refills: 0 | Status: DISCONTINUED | OUTPATIENT
Start: 2025-02-05 | End: 2025-02-06

## 2025-02-05 RX ORDER — INSULIN LISPRO 100/ML
20 VIAL (ML) SUBCUTANEOUS
Refills: 0 | Status: DISCONTINUED | OUTPATIENT
Start: 2025-02-06 | End: 2025-02-06

## 2025-02-05 RX ORDER — INSULIN LISPRO 100/ML
16 VIAL (ML) SUBCUTANEOUS
Refills: 0 | Status: DISCONTINUED | OUTPATIENT
Start: 2025-02-05 | End: 2025-02-05

## 2025-02-05 RX ORDER — INSULIN GLARGINE-YFGN 100 [IU]/ML
9 INJECTION, SOLUTION SUBCUTANEOUS AT BEDTIME
Refills: 0 | Status: DISCONTINUED | OUTPATIENT
Start: 2025-02-05 | End: 2025-02-06

## 2025-02-05 RX ORDER — INSULIN LISPRO 100/ML
18 VIAL (ML) SUBCUTANEOUS
Qty: 0 | Refills: 0 | DISCHARGE
Start: 2025-02-05

## 2025-02-05 RX ORDER — PREDNISONE 5 MG/1
10 TABLET ORAL
Qty: 0 | Refills: 0 | DISCHARGE
Start: 2025-02-05

## 2025-02-05 RX ORDER — INSULIN LISPRO 100/ML
18 VIAL (ML) SUBCUTANEOUS
Refills: 0 | Status: DISCONTINUED | OUTPATIENT
Start: 2025-02-06 | End: 2025-02-06

## 2025-02-05 RX ORDER — INSULIN LISPRO 100/ML
13 VIAL (ML) SUBCUTANEOUS
Refills: 0 | Status: DISCONTINUED | OUTPATIENT
Start: 2025-02-05 | End: 2025-02-05

## 2025-02-05 RX ORDER — INSULIN LISPRO 100/ML
24 VIAL (ML) SUBCUTANEOUS
Qty: 0 | Refills: 0 | DISCHARGE
Start: 2025-02-05

## 2025-02-05 RX ORDER — INSULIN GLARGINE-YFGN 100 [IU]/ML
8 INJECTION, SOLUTION SUBCUTANEOUS AT BEDTIME
Refills: 0 | Status: DISCONTINUED | OUTPATIENT
Start: 2025-02-05 | End: 2025-02-05

## 2025-02-05 RX ORDER — INSULIN LISPRO 100/ML
14 VIAL (ML) SUBCUTANEOUS
Qty: 0 | Refills: 0 | DISCHARGE
Start: 2025-02-05

## 2025-02-05 RX ORDER — SULFAMETHOXAZOLE AND TRIMETHOPRIM 400; 80 MG/1; MG/1
1 TABLET ORAL
Qty: 30 | Refills: 1
Start: 2025-02-05 | End: 2025-04-05

## 2025-02-05 RX ORDER — INSULIN GLARGINE-YFGN 100 [IU]/ML
11 INJECTION, SOLUTION SUBCUTANEOUS
Qty: 0 | Refills: 0 | DISCHARGE
Start: 2025-02-05

## 2025-02-05 RX ADMIN — POTASSIUM PHOSPHATE, MONOBASIC POTASSIUM PHOSPHATE, DIBASIC 62.5 MILLIMOLE(S): 236; 224 INJECTION, SOLUTION INTRAVENOUS at 02:13

## 2025-02-05 RX ADMIN — VALACYCLOVIR 500 MILLIGRAM(S): 1000 TABLET ORAL at 18:34

## 2025-02-05 RX ADMIN — Medication 1 DROP(S): at 18:39

## 2025-02-05 RX ADMIN — Medication 10: at 10:04

## 2025-02-05 RX ADMIN — ESCITALOPRAM 10 MILLIGRAM(S): 10 TABLET, FILM COATED ORAL at 11:55

## 2025-02-05 RX ADMIN — BUPROPION HYDROCHLORIDE 300 MILLIGRAM(S): 150 TABLET, EXTENDED RELEASE ORAL at 11:55

## 2025-02-05 RX ADMIN — ROSUVASTATIN CALCIUM 10 MILLIGRAM(S): 10 TABLET, FILM COATED ORAL at 21:53

## 2025-02-05 RX ADMIN — Medication 12.5 MILLIGRAM(S): at 18:35

## 2025-02-05 RX ADMIN — POLYETHYLENE GLYCOL 3350 17 GRAM(S): 17 POWDER, FOR SOLUTION ORAL at 18:35

## 2025-02-05 RX ADMIN — Medication 1 DROP(S): at 06:42

## 2025-02-05 RX ADMIN — LEVOTHYROXINE SODIUM 88 MICROGRAM(S): 25 TABLET ORAL at 06:42

## 2025-02-05 RX ADMIN — Medication 25 GRAM(S): at 02:13

## 2025-02-05 RX ADMIN — Medication 14 UNIT(S): at 19:26

## 2025-02-05 RX ADMIN — ANTISEPTIC SURGICAL SCRUB 1 APPLICATION(S): 0.04 SOLUTION TOPICAL at 10:08

## 2025-02-05 RX ADMIN — PANTOPRAZOLE 40 MILLIGRAM(S): 20 TABLET, DELAYED RELEASE ORAL at 06:42

## 2025-02-05 RX ADMIN — ENOXAPARIN SODIUM 40 MILLIGRAM(S): 100 INJECTION SUBCUTANEOUS at 21:54

## 2025-02-05 RX ADMIN — INSULIN GLARGINE-YFGN 9 UNIT(S): 100 INJECTION, SOLUTION SUBCUTANEOUS at 21:54

## 2025-02-05 RX ADMIN — CLONIDINE HYDROCHLORIDE 0.1 MILLIGRAM(S): 0.2 TABLET ORAL at 06:41

## 2025-02-05 RX ADMIN — Medication 2: at 02:14

## 2025-02-05 RX ADMIN — Medication 2 TABLET(S): at 21:53

## 2025-02-05 RX ADMIN — POLYETHYLENE GLYCOL 3350 17 GRAM(S): 17 POWDER, FOR SOLUTION ORAL at 06:41

## 2025-02-05 RX ADMIN — Medication 1 DROP(S): at 21:53

## 2025-02-05 RX ADMIN — VALACYCLOVIR 500 MILLIGRAM(S): 1000 TABLET ORAL at 06:42

## 2025-02-05 RX ADMIN — ATOVAQUONE 750 MILLIGRAM(S): 750 SUSPENSION ORAL at 18:35

## 2025-02-05 RX ADMIN — Medication 15 GRAM(S): at 18:35

## 2025-02-05 RX ADMIN — Medication 10 MILLIGRAM(S): at 06:41

## 2025-02-05 RX ADMIN — PREDNISONE 20 MILLIGRAM(S): 5 TABLET ORAL at 06:41

## 2025-02-05 RX ADMIN — Medication 12: at 12:29

## 2025-02-05 RX ADMIN — Medication 16 UNIT(S): at 10:05

## 2025-02-05 RX ADMIN — Medication 1 DROP(S): at 11:55

## 2025-02-05 RX ADMIN — ATOVAQUONE 750 MILLIGRAM(S): 750 SUSPENSION ORAL at 06:43

## 2025-02-05 RX ADMIN — Medication 15 GRAM(S): at 06:41

## 2025-02-05 RX ADMIN — Medication 12.5 MILLIGRAM(S): at 06:41

## 2025-02-05 RX ADMIN — Medication 100 MILLIGRAM(S): at 06:42

## 2025-02-05 RX ADMIN — Medication 15 GRAM(S): at 21:54

## 2025-02-05 NOTE — PROGRESS NOTE ADULT - SUBJECTIVE AND OBJECTIVE BOX
[facility] DIVISION OF KIDNEY DISEASES AND HYPERTENSION -- PROGRESS NOTE    24 hour events/subjective:  appears well. planned for discharge today     MEDICATIONS    Standing Inpatient Medications  amLODIPine   Tablet 10 milliGRAM(s) Oral daily  artificial tears (preservative free) Ophthalmic Solution 1 Drop(s) Both EYES every 6 hours  atovaquone  Suspension 750 milliGRAM(s) Oral every 12 hours  buPROPion XL (24-Hour) . 300 milliGRAM(s) Oral daily  carvedilol 12.5 milliGRAM(s) Oral every 12 hours  chlorhexidine 4% Liquid 1 Application(s) Topical <User Schedule>  cloNIDine 0.1 milliGRAM(s) Oral three times a day  dextrose 5%. 1000 milliLiter(s) IV Continuous <Continuous>  dextrose 5%. 1000 milliLiter(s) IV Continuous <Continuous>  dextrose 50% Injectable 25 Gram(s) IV Push once  enoxaparin Injectable 40 milliGRAM(s) SubCutaneous <User Schedule>  escitalopram 10 milliGRAM(s) Oral daily  glucagon  Injectable 1 milliGRAM(s) IntraMuscular once  hydrALAZINE 100 milliGRAM(s) Oral every 8 hours  insulin glargine Injectable (LANTUS) 9 Unit(s) SubCutaneous at bedtime  insulin lispro (ADMELOG) corrective regimen sliding scale   SubCutaneous <User Schedule>  insulin lispro (ADMELOG) corrective regimen sliding scale   SubCutaneous three times a day before meals  insulin lispro Injectable (ADMELOG) 14 Unit(s) SubCutaneous before dinner  lactulose Syrup 15 Gram(s) Oral every 8 hours  levothyroxine 88 MICROGram(s) Oral daily  pantoprazole    Tablet 40 milliGRAM(s) Oral before breakfast  polyethylene glycol 3350 17 Gram(s) Oral every 12 hours  predniSONE   Tablet 20 milliGRAM(s) Oral daily  rosuvastatin 10 milliGRAM(s) Oral at bedtime  senna 2 Tablet(s) Oral at bedtime  valACYclovir 500 milliGRAM(s) Oral every 12 hours    PRN Inpatient Medications  acetaminophen     Tablet .. 650 milliGRAM(s) Oral every 6 hours PRN  bisacodyl Suppository 10 milliGRAM(s) Rectal once PRN  dextrose Oral Gel 15 Gram(s) Oral once PRN  sodium chloride 0.9% lock flush 10 milliLiter(s) IV Push every 1 hour PRN      VITALS/PHYSICAL EXAM  --------------------------------------------------------------------------------  T(C): 37 (02-05-25 @ 13:54), Max: 37.2 (02-04-25 @ 16:47)  HR: 80 (02-05-25 @ 13:54) (69 - 80)  BP: 120/68 (02-05-25 @ 13:54) (120/68 - 137/73)  RR: 18 (02-05-25 @ 13:54) (18 - 18)  SpO2: 98% (02-05-25 @ 13:54) (96% - 99%)  Wt(kg): --        02-04-25 @ 07:01  -  02-05-25 @ 07:00  --------------------------------------------------------  IN: 1100 mL / OUT: 650 mL / NET: 450 mL    02-05-25 @ 07:01  -  02-05-25 @ 15:16  --------------------------------------------------------  IN: 0 mL / OUT: 1500 mL / NET: -1500 mL      Physical Exam:  Gen: NAD, well-appearing  HEENT: normal  Pulm: CTA B/L  CV: normal S1S2; no rub, no edema  Abd: soft, non-tender  MSK no deformities   Neuro: Alert and oriented x3     LABS/STUDIES  --------------------------------------------------------------------------------  141  |  104  |  37  ----------------------------<  265      [02-05-25 @ 07:40]  4.4   |  26  |  0.77        Ca     9.8     [02-05-25 @ 07:40]      Mg     2.0     [02-05-25 @ 07:40]      Phos  2.9     [02-05-25 @ 07:40]    TPro  5.8  /  Alb  2.8  /  TBili  0.4  /  DBili  x   /  AST  23  /  ALT  25  /  AlkPhos  94  [02-04-25 @ 07:41]          Creatinine Trend:  SCr 0.77 [02-05 @ 07:40]  SCr 0.94 [02-04 @ 07:41]  SCr 0.85 [02-03 @ 07:15]  SCr 0.92 [02-02 @ 22:19]  SCr 0.86 [02-02 @ 17:55]

## 2025-02-05 NOTE — PROGRESS NOTE ADULT - ASSESSMENT
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 Inpatient chemotherapy on hold for now. Endocrinology consulted for hyperglycemia. Patient is feeling good, has been eating full meals and tolerating POs. Continues on oral Pred 20mg once daily now for 11 days (until 2/13). Severe hyperglycemia this am with FBG, will increase Lantus to 9u QHS. Given postprandial hyperglycemia consistently, will increase mealtime insulin for tighter BG control as well. Patient denies eating snacks last night however has had suly crackers in the past as a after dinner snack. Endocrine will closely monitor BG and adjust insulin as needed for BG goal 100-180mg/dL inpatient. Please let Endocrine know of any changes to steroid dose!    #Uncontrolled Type 2 Diabetes Mellitus with  #Steroid-induced hyperglycemia  - Follows with: Dr. Romero  - A1C with Estimated Average Glucose Result: 7.8 % (12-29-24)  - home regimen: Came from rehab, there he was on Lantus 38 units, Admelog 10 units with correction scale ( as per pt, he usually get Lantus 10-15 units BID and premeal insulin per sliding scale; usually 5-6 units at home, and uses Dexcom G6 with reader )   - eGFR: 70 mL/min/1.73m2 (01-26-25)  - glucose grossly elevated, no evidence of DKA on labs    Steroid schedule   Prednisone 40 mg BID  (1/24-1/28)  Prednisone 40 mg daily (1/29-2/2)  Prednisone 20 mg daily (2/3- 2/13)           The patient is a 69y Male with PMH of renal transplant, 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 Inpatient chemotherapy on hold for now. Endocrinology consulted for hyperglycemia. Patient is feeling good, has been eating full meals and tolerating POs. Continues on oral Pred 20mg once daily now for 11 days (until 2/13). Severe hyperglycemia this am with FBG, will increase Lantus to 9u QHS. Given postprandial hyperglycemia consistently, will increase mealtime insulin for tighter BG control as well. Patient denies eating snacks last night however has had suly crackers in the past as a after dinner snack. Endocrine will closely monitor BG and adjust insulin as needed for BG goal 100-180mg/dL inpatient. Please let Endocrine know of any changes to steroid dose!    Patient BG at noon 428mg/dL, severe hyperglycemia. Discussed with RN, patient to be NPO until BG <250mg/dL. Please check BG q2h until BG <250mg/dL. Not concerned for DKA as of now given hx of T2DM and patient without symptoms of DKA currently, if repeat BG >500mg/dL, will send DKA labs.    #Uncontrolled Type 2 Diabetes Mellitus with  #Steroid-induced hyperglycemia  - Follows with: Dr. Romero  - A1C with Estimated Average Glucose Result: 7.8 % (12-29-24)  - home regimen: Came from rehab, there he was on Lantus 38 units, Admelog 10 units with correction scale ( as per pt, he usually get Lantus 10-15 units BID and premeal insulin per sliding scale; usually 5-6 units at home, and uses Dexcom G6 with reader )   - eGFR: 70 mL/min/1.73m2 (01-26-25)  - glucose grossly elevated, no evidence of DKA on labs    Steroid schedule   Prednisone 40 mg BID  (1/24-1/28)  Prednisone 40 mg daily (1/29-2/2)  Prednisone 20 mg daily (2/3- 2/13)           The patient is a 69y Male with PMH of renal transplant, 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 Inpatient chemotherapy on hold for now. Endocrinology consulted for hyperglycemia. Patient is feeling good, has been eating full meals and tolerating POs. Continues on oral Pred 20mg once daily now for 11 days (until 2/13). Severe hyperglycemia this am with FBG, will increase Lantus to 9u QHS. Given postprandial hyperglycemia consistently, will increase mealtime insulin for tighter BG control as well. Patient denies eating snacks last night however has had suly crackers in the past as a after dinner snack. Endocrine will closely monitor BG and adjust insulin as needed for BG goal 100-180mg/dL inpatient. Please let Endocrine know of any changes to steroid dose!    Patient BG at noon 428mg/dL, severe hyperglycemia. Discussed with RN and primary team resident, patient to be NPO until BG <250mg/dL. Please check BG q2h until BG <250mg/dL. Not concerned for DKA as of now given hx of T2DM and patient without symptoms of DKA currently, if repeat BG >500mg/dL, will send DKA labs.    #Uncontrolled Type 2 Diabetes Mellitus with  #Steroid-induced hyperglycemia  - Follows with: Dr. Romero  - A1C with Estimated Average Glucose Result: 7.8 % (12-29-24)  - home regimen: Came from rehab, there he was on Lantus 38 units, Admelog 10 units with correction scale ( as per pt, he usually get Lantus 10-15 units BID and premeal insulin per sliding scale; usually 5-6 units at home, and uses Dexcom G6 with reader )   - eGFR: 70 mL/min/1.73m2 (01-26-25)  - glucose grossly elevated, no evidence of DKA on labs    Steroid schedule   Prednisone 40 mg BID  (1/24-1/28)  Prednisone 40 mg daily (1/29-2/2)  Prednisone 20 mg daily (2/3- 2/13)

## 2025-02-05 NOTE — PROGRESS NOTE ADULT - PROBLEM SELECTOR PLAN 3
Course currently c/b steroid induced hyperglycemia     -Endocrinology following, recs appreciated  -C/w Lantus 6mg QHS, Admelog TID (14mg/14mg/10mg) before meals (HOLD if NPO or not eating). Taper down preemptively if steroids are being reduced to avoid hypoglycemia.   - Recommend moderate dose admelog correction scale TIDQAC and separate moderate dose scale QHS  - POCT glucose before meals and QHS, or q6h while NPO  - Inpatient glucose goals: <140 pre-meal, <180 random  - consistent carb diet

## 2025-02-05 NOTE — PROGRESS NOTE ADULT - ASSESSMENT
Patient is a 69 year old male with PMH of renal transplant 2012 (2/2 DM, s/p left nephrectomy), peripheral neuropathy, hypothyroidism, retroperitoneal fibrosis, HTN, HLD, GERD, anxiety/depression, ANGELIKA and recent admission at [facility] for ESBL E.coli urosepsis, imaging with ?sacral OM though pt with no sacral wound suspected findings likely related to mets, he was discharged on 12/18/24 to rehab, recently diagnosed DLBCL while admitted to [place] when he was noted to have hypercalcemia and liver masses s/p biopsy 12/16/24, now s/p R mini CHOP on 12/28 who was admitted 1/17 for cycle #2 Rmini CHOP, upon admission patient febrile and chemotherapy was held for now.  Prior cultures reviewed, history of ESBL Klebsiella in Ucx 12/31/24, ESBL E.coli 11/29/24 Ucx     Viral URI d/t coronavirus (not COVID), treated for possible bacterial pneumonia  Persistent fevers, worsening hypoxia likely due to PJP   - 1/17 RVP with Coronavirus (229E,HKU1,NL63,OC43) detected, repeat 1/28 also positive -likely ongoing viral shedding   - 1/17 CT Chest with extensive new b/l ground glass nodular opacities, upper lobe dominant - possible pneumonia   - 1/17 CTAP decreased R hepatic mass since 12/9/24; known splenic mass; changes likely c/w retroperitoneal fibrosis   - s/p zosyn 1/17-1/20, escalated to ertapenem 1/20pm d/t fevers but fevers continued with worsening hypoxia   - 1/23 CTAP with no significant changes, diffuse erosive changes in sacrum with soft tissue infiltration similar to prior, low suspicion for OM given no open wound in area  - 1/23 CT Chest with diffuse b/l patchy ggo with central predominance increased from 1/17/25 - edema vs pneumonia; unchanged LLL round atelectasis; small L pleural effusion   -- c/f PCP based on above repeat CT, ongoing fevers, worsening hypoxia now requiring HFNC, LDH increased, and h/o of being on steroids without ppx and iso malignancy, noted vancomycin added overnight, s/p hydrocortisone 1/22-1/23   - IP eval appreciated, pt prefers to avoid bronch d/t risk of difficulty weaning from vent  - 1/17, 1/19, 1/21, 1/24 Bcx remain NGTD  - 1/24 Aspergillus ag negative   1/25 ua negative, EBV serology c/w past infection, CMV PCR negative    1/26 fungitell > 500 - c/w suspicion for PJP  1/29 afebrile >48h now, WBC stable, weaned off HFNC-now on NC, no cough  1/31 WBC trend noted-likely reactive, now on NC 2L, afebrile, overall much improved   2/1 WBC normalized, on NC 2L  2/2 noted with hyperkalemia likely d/t bactrim, changed to atovaquone   2/4 potassium normalized, Cr stable, afebrile, on NC     s/p zosyn 1/17-1/20  s/p ertapenem 1/20- 1/24  s/p vancomycin x1 on 1/24  s/p meropenem 1/24-1/26   s/p IV bactrim 1/24-1/31  s/p PO bactrim 1/31-2/2     Recommendations:   Continue Atovaquone 750mg PO Q12h to complete total 21d course (including bactrim) on 2/13  -then can trial on bactrim DS 1 tablet PO daily for ppx, if any electrolytes issues then will use atovaquone 1500mg daily  Continue Prednisone -- 20mg PO daily x11 days (2/3-2/13)  Nephrology following, monitor renal function/K   Continue on valtrex ppx  Monitor temps/CBC  Aspiration precautions  Wound care, offloading as able, nutrition  Continue rest of care per primary team       Risa Ac M.D.  [facility]  Available on [employer] TEAMS - *PREFERRED*  [phone]  After 5pm on weekdays and all day on weekends - please call [phone]     Thank you for consulting us and involving us in the management of this patients case. In addition to reviewing history, imaging, documents, labs, microbiology, took into account antibiotic stewardship, local antibiogram and infection control strategies and potential transmission issues.

## 2025-02-05 NOTE — PROGRESS NOTE ADULT - NSPROGADDITIONALINFOA_GEN_ALL_CORE
Contact via [employer] Teams during business hours  To reach covering provider access AMION via sunrise tools  For Urgent matters/after-hours/weekends/holidays please page endocrine fellow on call   For nonurgent matters please email [email or web address]    Please note that this patient may be followed by different provider tomorrow.  Notify endocrine 24 hours prior to discharge for final recommendations
Contact via Microsoft Teams during business hours  To reach covering provider access AMION via sunrise tools  For Urgent matters/after-hours/weekends/holidays please page endocrine fellow on call   For nonurgent matters please email [email or web address]    Please note that this patient may be followed by different provider tomorrow.  Notify endocrine 24 hours prior to discharge for final recommendations
Contact via Microsoft Teams during business hours  To reach covering provider access AMION via sunrise tools  For Urgent matters/after-hours/weekends/holidays please page endocrine fellow on call   For nonurgent matters please email [email or web address]    Please note that this patient may be followed by different provider tomorrow.  Notify endocrine 24 hours prior to discharge for final recommendations
Contact via Microsoft Teams during business hours  To reach covering provider access AMION via sunrise tools  For Urgent matters/after-hours/weekends/holidays please page endocrine fellow on call   For nonurgent matters please email [email or web address]    Please note that this patient may be followed by different provider tomorrow.  Notify endocrine 24 hours prior to discharge for final recommendations
delivered spontaneously/intact

## 2025-02-05 NOTE — DISCHARGE NOTE NURSING/CASE MANAGEMENT/SOCIAL WORK - NSDCVIVACCINE_GEN_ALL_CORE_FT
Td (adult) preservative free; 24-Mar-2017 19:43; Bharat Bolivar (NURA); [employer]; [id number]; IntraMuscular; Deltoid Right.; 0.5 milliLiter(s); VIS (VIS Published: 24-Mar-2017, VIS Presented: 24-Mar-2017);   Tdap; 07-Oct-2016 17:16; Bharat Bolivar (NURA); [employer]; [id number]; IntraMuscular; Deltoid Right.; 0.5 milliLiter(s); VIS (VIS Published: 09-May-2013, VIS Presented: 07-Oct-2016);

## 2025-02-05 NOTE — PROGRESS NOTE ADULT - PROBLEM SELECTOR PLAN 2
1/17 Admitted + for Coronavirus, 1/24 - CT chest with c/f new PCP pneumonia, recs DC IV vanc, switch IV erta to IV mick given hypoalbuminemia efficacy concerns and start IV bactrim for empiric PCP PNA treatment as well as steroid taper pred 40mg BID x5 days followed by pred 40mg QD x5d then 20mg QD.   1/24 fungitell high > 500; pending galactomannan, beta D glucan, Initially on HFNC, weaned to 6L NC     Plan:  -Continue on PO atovaquone 750mg q12 until 2/13 to complete 21d course (including Bactrim). Start prophylactic dose of 1500mg Atovaquone ppx.   -wean O2 as tolerated and monitor on pulse ox, currently on 2L NC  -f/u sputum culture and sputum for PCP PCR if able to expectorate   -f/u aspergillus Ag, in process   -c/w Prednisone 40mg PO BID x5d until 1/28 then 40mg daily x5d (1/29-2/2), then 20mg daily for 11 days (2/3-2/13)  -Continue on valtrex ppx  - Monitor temps/CBC

## 2025-02-05 NOTE — PROGRESS NOTE ADULT - SUBJECTIVE AND OBJECTIVE BOX
INTERVAL HPI/OVERNIGHT EVENTS:  Patient S&E at bedside. No o/n events, patient resting comfortably. No complaints at this time. Awaiting discharge today.    VITAL SIGNS:  T(F): 98.9 (02-05-25 @ 09:04)  HR: 69 (02-05-25 @ 09:04)  BP: 137/66 (02-05-25 @ 09:04)  RR: 18 (02-05-25 @ 09:04)  SpO2: 99% (02-05-25 @ 09:04)  Wt(kg): --    PHYSICAL EXAM:    Constitutional: NAD  Eyes: EOMI, sclera non-icteric  Neck: supple, no masses, no JVD  Respiratory: CTA b/l, good air entry b/l  Cardiovascular: RRR, no M/R/G  Gastrointestinal: soft, NTND, no masses palpable, + BS  : hodges cath w/ yellow urine  Extremities: no c/c/e  Neurological: AAOx3      MEDICATIONS  (STANDING):  amLODIPine   Tablet 10 milliGRAM(s) Oral daily  artificial tears (preservative free) Ophthalmic Solution 1 Drop(s) Both EYES every 6 hours  atovaquone  Suspension 750 milliGRAM(s) Oral every 12 hours  buPROPion XL (24-Hour) . 300 milliGRAM(s) Oral daily  carvedilol 12.5 milliGRAM(s) Oral every 12 hours  chlorhexidine 4% Liquid 1 Application(s) Topical <User Schedule>  cloNIDine 0.1 milliGRAM(s) Oral three times a day  dextrose 5%. 1000 milliLiter(s) (50 mL/Hr) IV Continuous <Continuous>  dextrose 5%. 1000 milliLiter(s) (100 mL/Hr) IV Continuous <Continuous>  dextrose 50% Injectable 25 Gram(s) IV Push once  enoxaparin Injectable 40 milliGRAM(s) SubCutaneous <User Schedule>  escitalopram 10 milliGRAM(s) Oral daily  glucagon  Injectable 1 milliGRAM(s) IntraMuscular once  hydrALAZINE 100 milliGRAM(s) Oral every 8 hours  insulin glargine Injectable (LANTUS) 8 Unit(s) SubCutaneous at bedtime  insulin lispro (ADMELOG) corrective regimen sliding scale   SubCutaneous <User Schedule>  insulin lispro (ADMELOG) corrective regimen sliding scale   SubCutaneous three times a day before meals  insulin lispro Injectable (ADMELOG) 13 Unit(s) SubCutaneous with dinner  insulin lispro Injectable (ADMELOG) 16 Unit(s) SubCutaneous before lunch  insulin lispro Injectable (ADMELOG) 16 Unit(s) SubCutaneous before breakfast  lactulose Syrup 15 Gram(s) Oral every 8 hours  levothyroxine 88 MICROGram(s) Oral daily  pantoprazole    Tablet 40 milliGRAM(s) Oral before breakfast  polyethylene glycol 3350 17 Gram(s) Oral every 12 hours  predniSONE   Tablet 20 milliGRAM(s) Oral daily  rosuvastatin 10 milliGRAM(s) Oral at bedtime  senna 2 Tablet(s) Oral at bedtime  valACYclovir 500 milliGRAM(s) Oral every 12 hours    MEDICATIONS  (PRN):  acetaminophen     Tablet .. 650 milliGRAM(s) Oral every 6 hours PRN Temp greater or equal to 38C (100.4F), Mild Pain (1 - 3)  bisacodyl Suppository 10 milliGRAM(s) Rectal once PRN Constipation  dextrose Oral Gel 15 Gram(s) Oral once PRN Blood Glucose LESS THAN 70 milliGRAM(s)/deciliter  sodium chloride 0.9% lock flush 10 milliLiter(s) IV Push every 1 hour PRN Pre/post blood products, medications, blood draw, and to maintain line patency      Allergies    No Known Allergies    Intolerances        LABS:                        9.1    7.88  )-----------( 256      ( 05 Feb 2025 07:41 )             29.2     02-05    141  |  104  |  37[H]  ----------------------------<  265[H]  4.4   |  26  |  0.77    Ca    9.8      05 Feb 2025 07:40  Phos  2.9     02-05  Mg     2.0     02-05    TPro  5.8[L]  /  Alb  2.8[L]  /  TBili  0.4  /  DBili  x   /  AST  23  /  ALT  25  /  AlkPhos  94  02-04      Urinalysis Basic - ( 05 Feb 2025 07:40 )    Color: x / Appearance: x / SG: x / pH: x  Gluc: 265 mg/dL / Ketone: x  / Bili: x / Urobili: x   Blood: x / Protein: x / Nitrite: x   Leuk Esterase: x / RBC: x / WBC x   Sq Epi: x / Non Sq Epi: x / Bacteria: x        RADIOLOGY & ADDITIONAL TESTS:  Studies reviewed.    ASSESSMENT & PLAN:

## 2025-02-05 NOTE — DISCHARGE NOTE NURSING/CASE MANAGEMENT/SOCIAL WORK - NSDCFUADDAPPT_GEN_ALL_CORE_FT
APPTS ARE READY TO BE MADE: [ ] YES    Best Family or Patient Contact (if needed):    Additional Information about above appointments (if needed):    1: Oncology within 2 weeks of discharge  2: Infectious disease within 2 weeks of discharge  3: Endocrinology within 2 weeks of discharge    Other comments or requests:

## 2025-02-05 NOTE — PROGRESS NOTE ADULT - ASSESSMENT
68 y/o M PMHx renal transplant 2012 (2/2 DM, s/p left nephrectomy), peripheral neuropathy, hypothyroidism, retroperitoneal fibrosis, HTN, HLD, GERD, anxiety/depression, ANGELIKA and recent admission at [facility] for sacral osteomyelitis and ESBL.coli urosepsis discharged on 12/18/24 to rehab. While admitted to [facility] was noted to have hypercalcemia and liver masses which were biopsied on 12/16/24, biopsy revealed DLBCL. Patient received R mini CHOP on 12/28 now admitted for cycle #2 R-mini CHOP, upon admission patient is febrile and chemotherapy held, course c/b AHRF and c/f PJP pneumonia. ready for d/c pending MILO placement at [facility].

## 2025-02-05 NOTE — PROGRESS NOTE ADULT - SUBJECTIVE AND OBJECTIVE BOX
[facility]  WANG Mccoy Y. Patel, S. Shah, G. Casimir  [phone]  ([phone] - weekdays after 5pm and weekends)    Name: JAN CALVIN  Age/Gender: 69y Male  MRN: [id number]    Interval History:  Patient seen and examined this morning.   No new complaints noted.  Notes reviewed  No concerning overnight events  Afebrile   Allergies: No Known Allergies      Objective:  Vitals:   T(F): 98.9 (02-05-25 @ 09:04), Max: 99 (02-04-25 @ 16:47)  HR: 69 (02-05-25 @ 09:04) (69 - 80)  BP: 137/66 (02-05-25 @ 09:04) (121/69 - 137/73)  RR: 18 (02-05-25 @ 09:04) (18 - 18)  SpO2: 99% (02-05-25 @ 09:04) (96% - 99%)  Physical Examination:  General: no acute distress, NC   HEENT: normocephalic, atraumatic, anicteric  Respiratory: no acc muscle use, breathing comfortably  Cardiovascular: S1 and S2 present  Gastrointestinal: normal appearing, nondistended  Extremities: no edema, no cyanosis  Skin: no visible rash    Laboratory Studies:  CBC:                       9.1    7.88  )-----------( 256      ( 05 Feb 2025 07:41 )             29.2     WBC Trend:  7.88 02-05-25 @ 07:41  8.60 02-04-25 @ 07:41  9.27 02-03-25 @ 07:14  9.08 02-02-25 @ 08:38  8.99 02-01-25 @ 06:55  13.05 01-31-25 @ 07:10  12.21 01-30-25 @ 07:05    CMP: 02-05    141  |  104  |  37[H]  ----------------------------<  265[H]  4.4   |  26  |  0.77    Ca    9.8      05 Feb 2025 07:40  Phos  2.9     02-05  Mg     2.0     02-05    TPro  5.8[L]  /  Alb  2.8[L]  /  TBili  0.4  /  DBili  x   /  AST  23  /  ALT  25  /  AlkPhos  94  02-04    Creatinine: 0.77 mg/dL (02-05-25 @ 07:40)  Creatinine: 0.94 mg/dL (02-04-25 @ 07:41)  Creatinine: 0.85 mg/dL (02-03-25 @ 07:15)  Creatinine: 0.92 mg/dL (02-02-25 @ 22:19)  Creatinine: 0.86 mg/dL (02-02-25 @ 17:55)  Creatinine: 0.75 mg/dL (02-02-25 @ 13:39)  Creatinine: 0.80 mg/dL (02-02-25 @ 08:38)  Creatinine: 0.84 mg/dL (02-01-25 @ 06:54)  Creatinine: 0.98 mg/dL (01-31-25 @ 07:11)  Creatinine: 0.95 mg/dL (01-30-25 @ 07:06)    LIVER FUNCTIONS - ( 04 Feb 2025 07:41 )  Alb: 2.8 g/dL / Pro: 5.8 g/dL / ALK PHOS: 94 U/L / ALT: 25 U/L / AST: 23 U/L / GGT: x           Microbiology: reviewed   Culture - Blood (collected 01-24-25 @ 00:46)  Source: .Blood BLOOD  Final Report (01-29-25 @ 04:00):    No growth at 5 days    Culture - Blood (collected 01-24-25 @ 00:18)  Source: .Blood BLOOD  Final Report (01-29-25 @ 04:00):    No growth at 5 days    Radiology: reviewed     Medications:  acetaminophen     Tablet .. 650 milliGRAM(s) Oral every 6 hours PRN  amLODIPine   Tablet 10 milliGRAM(s) Oral daily  artificial tears (preservative free) Ophthalmic Solution 1 Drop(s) Both EYES every 6 hours  atovaquone  Suspension 750 milliGRAM(s) Oral every 12 hours  bisacodyl Suppository 10 milliGRAM(s) Rectal once PRN  buPROPion XL (24-Hour) . 300 milliGRAM(s) Oral daily  carvedilol 12.5 milliGRAM(s) Oral every 12 hours  chlorhexidine 4% Liquid 1 Application(s) Topical <User Schedule>  cloNIDine 0.1 milliGRAM(s) Oral three times a day  dextrose 5%. 1000 milliLiter(s) IV Continuous <Continuous>  dextrose 5%. 1000 milliLiter(s) IV Continuous <Continuous>  dextrose 50% Injectable 25 Gram(s) IV Push once  dextrose Oral Gel 15 Gram(s) Oral once PRN  enoxaparin Injectable 40 milliGRAM(s) SubCutaneous <User Schedule>  escitalopram 10 milliGRAM(s) Oral daily  glucagon  Injectable 1 milliGRAM(s) IntraMuscular once  hydrALAZINE 100 milliGRAM(s) Oral every 8 hours  insulin glargine Injectable (LANTUS) 8 Unit(s) SubCutaneous at bedtime  insulin lispro (ADMELOG) corrective regimen sliding scale   SubCutaneous <User Schedule>  insulin lispro (ADMELOG) corrective regimen sliding scale   SubCutaneous three times a day before meals  insulin lispro Injectable (ADMELOG) 13 Unit(s) SubCutaneous with dinner  insulin lispro Injectable (ADMELOG) 16 Unit(s) SubCutaneous before lunch  insulin lispro Injectable (ADMELOG) 16 Unit(s) SubCutaneous before breakfast  lactulose Syrup 15 Gram(s) Oral every 8 hours  levothyroxine 88 MICROGram(s) Oral daily  pantoprazole    Tablet 40 milliGRAM(s) Oral before breakfast  polyethylene glycol 3350 17 Gram(s) Oral every 12 hours  predniSONE   Tablet 20 milliGRAM(s) Oral daily  rosuvastatin 10 milliGRAM(s) Oral at bedtime  senna 2 Tablet(s) Oral at bedtime  sodium chloride 0.9% lock flush 10 milliLiter(s) IV Push every 1 hour PRN  valACYclovir 500 milliGRAM(s) Oral every 12 hours    Current Antimicrobials:  atovaquone  Suspension 750 milliGRAM(s) Oral every 12 hours  valACYclovir 500 milliGRAM(s) Oral every 12 hours    Prior/Completed Antimicrobials:  ertapenem  IVPB  piperacillin/tazobactam IVPB.  piperacillin/tazobactam IVPB.-  vancomycin  IVPB

## 2025-02-05 NOTE — DISCHARGE NOTE NURSING/CASE MANAGEMENT/SOCIAL WORK - PATIENT PORTAL LINK FT
You can access the FollowMyHealth Patient Portal offered by [facility] by registering at the following website: [email or web address]. By joining [facility]’s FollowMyHealth portal, you will also be able to view your health information using other applications (apps) compatible with our system.

## 2025-02-05 NOTE — PROGRESS NOTE ADULT - SUBJECTIVE AND OBJECTIVE BOX
Patient is a 69y old  Male who presents with a chief complaint of "For chemo" (04 Feb 2025 13:34)      SUBJECTIVE / OVERNIGHT EVENTS: Patient seen and examined at bedside. No acute events overnight. Pt denies fevers, chills, chest pain, abdominal pain, n/v/d.    MEDICATIONS  (STANDING):  amLODIPine   Tablet 10 milliGRAM(s) Oral daily  artificial tears (preservative free) Ophthalmic Solution 1 Drop(s) Both EYES every 6 hours  atovaquone  Suspension 750 milliGRAM(s) Oral every 12 hours  buPROPion XL (24-Hour) . 300 milliGRAM(s) Oral daily  carvedilol 12.5 milliGRAM(s) Oral every 12 hours  chlorhexidine 4% Liquid 1 Application(s) Topical <User Schedule>  cloNIDine 0.1 milliGRAM(s) Oral three times a day  dextrose 5%. 1000 milliLiter(s) (50 mL/Hr) IV Continuous <Continuous>  dextrose 5%. 1000 milliLiter(s) (100 mL/Hr) IV Continuous <Continuous>  dextrose 50% Injectable 25 Gram(s) IV Push once  enoxaparin Injectable 40 milliGRAM(s) SubCutaneous <User Schedule>  escitalopram 10 milliGRAM(s) Oral daily  glucagon  Injectable 1 milliGRAM(s) IntraMuscular once  hydrALAZINE 100 milliGRAM(s) Oral every 8 hours  insulin glargine Injectable (LANTUS) 6 Unit(s) SubCutaneous at bedtime  insulin lispro (ADMELOG) corrective regimen sliding scale   SubCutaneous three times a day before meals  insulin lispro (ADMELOG) corrective regimen sliding scale   SubCutaneous <User Schedule>  insulin lispro Injectable (ADMELOG) 14 Unit(s) SubCutaneous before breakfast  insulin lispro Injectable (ADMELOG) 14 Unit(s) SubCutaneous before lunch  insulin lispro Injectable (ADMELOG) 10 Unit(s) SubCutaneous with dinner  lactulose Syrup 15 Gram(s) Oral every 8 hours  levothyroxine 88 MICROGram(s) Oral daily  pantoprazole    Tablet 40 milliGRAM(s) Oral before breakfast  polyethylene glycol 3350 17 Gram(s) Oral every 12 hours  predniSONE   Tablet 20 milliGRAM(s) Oral daily  rosuvastatin 10 milliGRAM(s) Oral at bedtime  senna 2 Tablet(s) Oral at bedtime  valACYclovir 500 milliGRAM(s) Oral every 12 hours    MEDICATIONS  (PRN):  acetaminophen     Tablet .. 650 milliGRAM(s) Oral every 6 hours PRN Temp greater or equal to 38C (100.4F), Mild Pain (1 - 3)  bisacodyl Suppository 10 milliGRAM(s) Rectal once PRN Constipation  dextrose Oral Gel 15 Gram(s) Oral once PRN Blood Glucose LESS THAN 70 milliGRAM(s)/deciliter  sodium chloride 0.9% lock flush 10 milliLiter(s) IV Push every 1 hour PRN Pre/post blood products, medications, blood draw, and to maintain line patency      Vital Signs Last 24 Hrs  T(C): 36.6 (05 Feb 2025 06:30), Max: 37.2 (04 Feb 2025 16:47)  T(F): 97.8 (05 Feb 2025 06:30), Max: 99 (04 Feb 2025 16:47)  HR: 71 (05 Feb 2025 06:30) (70 - 80)  BP: 127/66 (05 Feb 2025 06:30) (121/69 - 137/73)  BP(mean): --  RR: 18 (05 Feb 2025 06:30) (18 - 18)  SpO2: 97% (05 Feb 2025 06:30) (96% - 99%)    Parameters below as of 05 Feb 2025 06:30  Patient On (Oxygen Delivery Method): nasal cannula  O2 Flow (L/min): 2    CAPILLARY BLOOD GLUCOSE      POCT Blood Glucose.: 330 mg/dL (05 Feb 2025 08:37)  POCT Blood Glucose.: 287 mg/dL (05 Feb 2025 01:48)  POCT Blood Glucose.: 247 mg/dL (04 Feb 2025 21:03)  POCT Blood Glucose.: 217 mg/dL (04 Feb 2025 17:19)  POCT Blood Glucose.: 213 mg/dL (04 Feb 2025 12:01)  POCT Blood Glucose.: 212 mg/dL (04 Feb 2025 09:01)    I&O's Summary    04 Feb 2025 07:01  -  05 Feb 2025 07:00  --------------------------------------------------------  IN: 1100 mL / OUT: 650 mL / NET: 450 mL        PHYSICAL EXAM:  GENERAL: NAD, lying in bed comfortably  HEAD:  Atraumatic, normocephalic  EYES: EOMI, PERRLA, conjunctiva clear, no conjunctival pallor, anicteric sclera  NECK: Supple, trachea midline, no JVD  HEART: Regular rate and rhythm, Normal S1 S2, no murmurs, rubs, or gallops  LUNGS: Unlabored respirations. Clear to ascultation bilaterally, no crackles, wheezing, or rhonchi  ABDOMEN: Soft, nondistended, nontender, no rebound or guarding, bowel sounds presents  EXTREMITIES: Warm extremities, no clubbing, cyanosis, or edema, peripheral pulses 2+ bilaterally  MUSCULOSKELETAL: No joint swelling or tenderness to palpation  NEURO: CN 2-12 grossly intact, moves all limbs spontaneously  SKIN: No rashes or lesions. Sterile dressing over sacral pressure wound.     LABS:                        9.1    7.88  )-----------( 256      ( 05 Feb 2025 07:41 )             29.2     02-05    141  |  104  |  37[H]  ----------------------------<  265[H]  4.4   |  26  |  0.77    Ca    9.8      05 Feb 2025 07:40  Phos  2.9     02-05  Mg     2.0     02-05    TPro  5.8[L]  /  Alb  2.8[L]  /  TBili  0.4  /  DBili  x   /  AST  23  /  ALT  25  /  AlkPhos  94  02-04          Urinalysis Basic - ( 05 Feb 2025 07:40 )    Color: x / Appearance: x / SG: x / pH: x  Gluc: 265 mg/dL / Ketone: x  / Bili: x / Urobili: x   Blood: x / Protein: x / Nitrite: x   Leuk Esterase: x / RBC: x / WBC x   Sq Epi: x / Non Sq Epi: x / Bacteria: x        RADIOLOGY & ADDITIONAL TESTS:    Imaging Personally Reviewed:    Consultant(s) Notes Reviewed:      Care Discussed with Consultants/Other Providers:    Nathalie Brunner MD, Internal Medicine Resident     Patient is a 69y old  Male who presents with a chief complaint of "For chemo" (04 Feb 2025 13:34)      SUBJECTIVE / OVERNIGHT EVENTS: Patient seen and examined at bedside. No acute events overnight. Pt denies fevers, chills, chest pain, abdominal pain, n/v/d.    MEDICATIONS  (STANDING):  amLODIPine   Tablet 10 milliGRAM(s) Oral daily  artificial tears (preservative free) Ophthalmic Solution 1 Drop(s) Both EYES every 6 hours  atovaquone  Suspension 750 milliGRAM(s) Oral every 12 hours  buPROPion XL (24-Hour) . 300 milliGRAM(s) Oral daily  carvedilol 12.5 milliGRAM(s) Oral every 12 hours  chlorhexidine 4% Liquid 1 Application(s) Topical <User Schedule>  cloNIDine 0.1 milliGRAM(s) Oral three times a day  dextrose 5%. 1000 milliLiter(s) (50 mL/Hr) IV Continuous <Continuous>  dextrose 5%. 1000 milliLiter(s) (100 mL/Hr) IV Continuous <Continuous>  dextrose 50% Injectable 25 Gram(s) IV Push once  enoxaparin Injectable 40 milliGRAM(s) SubCutaneous <User Schedule>  escitalopram 10 milliGRAM(s) Oral daily  glucagon  Injectable 1 milliGRAM(s) IntraMuscular once  hydrALAZINE 100 milliGRAM(s) Oral every 8 hours  insulin glargine Injectable (LANTUS) 6 Unit(s) SubCutaneous at bedtime  insulin lispro (ADMELOG) corrective regimen sliding scale   SubCutaneous three times a day before meals  insulin lispro (ADMELOG) corrective regimen sliding scale   SubCutaneous <User Schedule>  insulin lispro Injectable (ADMELOG) 14 Unit(s) SubCutaneous before breakfast  insulin lispro Injectable (ADMELOG) 14 Unit(s) SubCutaneous before lunch  insulin lispro Injectable (ADMELOG) 10 Unit(s) SubCutaneous with dinner  lactulose Syrup 15 Gram(s) Oral every 8 hours  levothyroxine 88 MICROGram(s) Oral daily  pantoprazole    Tablet 40 milliGRAM(s) Oral before breakfast  polyethylene glycol 3350 17 Gram(s) Oral every 12 hours  predniSONE   Tablet 20 milliGRAM(s) Oral daily  rosuvastatin 10 milliGRAM(s) Oral at bedtime  senna 2 Tablet(s) Oral at bedtime  valACYclovir 500 milliGRAM(s) Oral every 12 hours    MEDICATIONS  (PRN):  acetaminophen     Tablet .. 650 milliGRAM(s) Oral every 6 hours PRN Temp greater or equal to 38C (100.4F), Mild Pain (1 - 3)  bisacodyl Suppository 10 milliGRAM(s) Rectal once PRN Constipation  dextrose Oral Gel 15 Gram(s) Oral once PRN Blood Glucose LESS THAN 70 milliGRAM(s)/deciliter  sodium chloride 0.9% lock flush 10 milliLiter(s) IV Push every 1 hour PRN Pre/post blood products, medications, blood draw, and to maintain line patency      Vital Signs Last 24 Hrs  T(C): 36.6 (05 Feb 2025 06:30), Max: 37.2 (04 Feb 2025 16:47)  T(F): 97.8 (05 Feb 2025 06:30), Max: 99 (04 Feb 2025 16:47)  HR: 71 (05 Feb 2025 06:30) (70 - 80)  BP: 127/66 (05 Feb 2025 06:30) (121/69 - 137/73)  BP(mean): --  RR: 18 (05 Feb 2025 06:30) (18 - 18)  SpO2: 97% (05 Feb 2025 06:30) (96% - 99%)    Parameters below as of 05 Feb 2025 06:30  Patient On (Oxygen Delivery Method): nasal cannula  O2 Flow (L/min): 2    CAPILLARY BLOOD GLUCOSE      POCT Blood Glucose.: 330 mg/dL (05 Feb 2025 08:37)  POCT Blood Glucose.: 287 mg/dL (05 Feb 2025 01:48)  POCT Blood Glucose.: 247 mg/dL (04 Feb 2025 21:03)  POCT Blood Glucose.: 217 mg/dL (04 Feb 2025 17:19)  POCT Blood Glucose.: 213 mg/dL (04 Feb 2025 12:01)  POCT Blood Glucose.: 212 mg/dL (04 Feb 2025 09:01)    I&O's Summary    04 Feb 2025 07:01  -  05 Feb 2025 07:00  --------------------------------------------------------  IN: 1100 mL / OUT: 650 mL / NET: 450 mL        PHYSICAL EXAM:  GENERAL: NAD, lying in bed comfortably  HEAD:  Atraumatic, normocephalic  EYES: EOMI, PERRLA, conjunctiva clear, no conjunctival pallor, anicteric sclera  NECK: Supple, trachea midline, no JVD  HEART: Regular rate and rhythm, Normal S1 S2, no murmurs, rubs, or gallops  LUNGS: Unlabored respirations. Clear to ascultation bilaterally, no crackles, wheezing, or rhonchi  ABDOMEN: Soft, nondistended, nontender, no rebound or guarding, bowel sounds presents  EXTREMITIES: Warm extremities, no clubbing, cyanosis, or edema, peripheral pulses 2+ bilaterally  MUSCULOSKELETAL: No joint swelling or tenderness to palpation  NEURO: CN 2-12 grossly intact, moves all limbs spontaneously  SKIN: No rashes or lesions. Sterile dressing over sacral pressure wound.   Central line: PICC line C/d/i    LABS:                        9.1    7.88  )-----------( 256      ( 05 Feb 2025 07:41 )             29.2     02-05    141  |  104  |  37[H]  ----------------------------<  265[H]  4.4   |  26  |  0.77    Ca    9.8      05 Feb 2025 07:40  Phos  2.9     02-05  Mg     2.0     02-05    TPro  5.8[L]  /  Alb  2.8[L]  /  TBili  0.4  /  DBili  x   /  AST  23  /  ALT  25  /  AlkPhos  94  02-04          Urinalysis Basic - ( 05 Feb 2025 07:40 )    Color: x / Appearance: x / SG: x / pH: x  Gluc: 265 mg/dL / Ketone: x  / Bili: x / Urobili: x   Blood: x / Protein: x / Nitrite: x   Leuk Esterase: x / RBC: x / WBC x   Sq Epi: x / Non Sq Epi: x / Bacteria: x        RADIOLOGY & ADDITIONAL TESTS:    Imaging Personally Reviewed:    Consultant(s) Notes Reviewed:      Care Discussed with Consultants/Other Providers:    Nathalie Brunner MD, Internal Medicine Resident

## 2025-02-05 NOTE — DISCHARGE NOTE NURSING/CASE MANAGEMENT/SOCIAL WORK - FINANCIAL ASSISTANCE
[facility] provides services at a reduced cost to those who are determined to be eligible through [facility]’s financial assistance program. Information regarding [facility]’s financial assistance program can be found by going to [email or web address] or by calling [phone].

## 2025-02-05 NOTE — PROGRESS NOTE ADULT - PROBLEM SELECTOR PLAN 8
Stage 2 sacrococcygeal decubitus and incontinent dermatitis. Sterile dressing over pressure ulcer.   Wound recs appreciated

## 2025-02-05 NOTE — PROGRESS NOTE ADULT - PROBLEM SELECTOR PLAN 1
INPATIENT PLAN:  - Check BG TID AC, HS, and 2AM  - Adjust Lantus to 9u QHS  - Adjust Admelog to 18u with breakfast, 16u with lunch, and 13u with dinner (HOLD if NPO or steroid held)  - C/w moderate dose Admelog correctional scales TID AC, HS, and 2AM  - Please keep hypoglycemia protocol in place     DISCHARGE PLANNING:  - Discharge recs pending clinical course and depending on steroids. Will need basal/bolus (doses TBD).   - Endocrine follow up: can f/u wit his Endocrinologist Dr. Romero.   - Recommend routine outpatient ophthalmology and podiatry follow up. INPATIENT PLAN:  - Check BG TID AC, HS, and 2AM  - Adjust Lantus to 9u QHS  - Adjust Admelog to 20u with breakfast, 18u with lunch, and 14u with dinner (HOLD if NPO or steroid held)  - C/w moderate dose Admelog correctional scales TID AC, HS, and 2AM  - Please keep hypoglycemia protocol in place     DISCHARGE PLANNING:  - Discharge recs pending clinical course and depending on steroids. Will need basal/bolus (doses TBD).   - Endocrine follow up: can f/u wit his Endocrinologist Dr. Romero.   - Recommend routine outpatient ophthalmology and podiatry follow up.

## 2025-02-05 NOTE — PROGRESS NOTE ADULT - PROBLEM SELECTOR PLAN 5
AT RISK FOR ADRENAL INSUFFIENCEY IF HYPOTENSIVE 50mg hydrocortisone IV q8  s/p Hydrocortisone 25 mg IV, now off--on pred taper for PJP PNA  1/26 D/C Tacrolimus and Lisinopril due to DIMITRIOS and PCP.   Transplant nephro / Transplant ID following    UOP improved after IVF  K improved to 4.0 today 2/4

## 2025-02-05 NOTE — PROGRESS NOTE ADULT - SUBJECTIVE AND OBJECTIVE BOX
Patient seen today for follow up inpatient Diabetes Mellitus management.    Chief Complaint: Type 2 Diabetes Mellitus    INTERVAL HX:  Patient seen in [facility] 9MON 918 W1. Patient is alert and oriented, resting in bed. Patient reports feeling good, has been eating full meals and tolerating POs. FBG with severe hyperglycemia this am to 330mg/dL, 265mg/dL on blood serum. Noted BG 380mg/dL checked at 1000, patient had already eaten his breakfast as noted this am at bedside around 0930. Patient denies eating any OSH food or snacks overnight, states he eats 'what I am given here only'. Postprandial hyperglycemia noted last evening to 247mg/dL. Continues on oral Pred 10mg once daily. Continues on Pred 20mg once daily. Blood glucose levels in the last 24hrs have been 213-380mg/dL.     Review of Systems:  General: As above.  Respiratory: Denies any SOB, GRAY, or cough.  Gastrointestinal: Denies any n/v/d or abdominal pain.   Endocrine: Denies any polyuria, polydipsia, polyphagia, visual changes, or numbness in feet.     Allergies  No Known Allergies      Intolerances  None.       MEDICATIONS  (STANDING):  acetaminophen     Tablet .. 650 milliGRAM(s) Oral every 6 hours PRN  amLODIPine   Tablet 10 milliGRAM(s) Oral daily  artificial tears (preservative free) Ophthalmic Solution 1 Drop(s) Both EYES every 6 hours  atovaquone  Suspension 750 milliGRAM(s) Oral every 12 hours  bisacodyl Suppository 10 milliGRAM(s) Rectal once PRN  buPROPion XL (24-Hour) . 300 milliGRAM(s) Oral daily  carvedilol 12.5 milliGRAM(s) Oral every 12 hours  chlorhexidine 4% Liquid 1 Application(s) Topical <User Schedule>  cloNIDine 0.1 milliGRAM(s) Oral three times a day  dextrose 5%. 1000 milliLiter(s) IV Continuous <Continuous>  dextrose 5%. 1000 milliLiter(s) IV Continuous <Continuous>  dextrose 50% Injectable 25 Gram(s) IV Push once  dextrose Oral Gel 15 Gram(s) Oral once PRN  enoxaparin Injectable 40 milliGRAM(s) SubCutaneous <User Schedule>  escitalopram 10 milliGRAM(s) Oral daily  glucagon  Injectable 1 milliGRAM(s) IntraMuscular once  hydrALAZINE 100 milliGRAM(s) Oral every 8 hours  insulin glargine Injectable (LANTUS) 8 Unit(s) SubCutaneous at bedtime  insulin lispro (ADMELOG) corrective regimen sliding scale   SubCutaneous <User Schedule>  insulin lispro (ADMELOG) corrective regimen sliding scale   SubCutaneous three times a day before meals  insulin lispro Injectable (ADMELOG) 13 Unit(s) SubCutaneous with dinner  insulin lispro Injectable (ADMELOG) 16 Unit(s) SubCutaneous before lunch  insulin lispro Injectable (ADMELOG) 16 Unit(s) SubCutaneous before breakfast  lactulose Syrup 15 Gram(s) Oral every 8 hours  levothyroxine 88 MICROGram(s) Oral daily  pantoprazole    Tablet 40 milliGRAM(s) Oral before breakfast  polyethylene glycol 3350 17 Gram(s) Oral every 12 hours  predniSONE   Tablet 20 milliGRAM(s) Oral daily  rosuvastatin 10 milliGRAM(s) Oral at bedtime  senna 2 Tablet(s) Oral at bedtime  sodium chloride 0.9% lock flush 10 milliLiter(s) IV Push every 1 hour PRN  valACYclovir 500 milliGRAM(s) Oral every 12 hours      dextrose 50% Injectable 25 Gram(s) IV Push once  dextrose Oral Gel 15 Gram(s) Oral once PRN  glucagon  Injectable 1 milliGRAM(s) IntraMuscular once  insulin glargine Injectable (LANTUS) 8 Unit(s) SubCutaneous at bedtime  insulin lispro (ADMELOG) corrective regimen sliding scale   SubCutaneous three times a day before meals  insulin lispro (ADMELOG) corrective regimen sliding scale   SubCutaneous <User Schedule>  insulin lispro Injectable (ADMELOG) 13 Unit(s) SubCutaneous with dinner  insulin lispro Injectable (ADMELOG) 16 Unit(s) SubCutaneous before lunch  insulin lispro Injectable (ADMELOG) 16 Unit(s) SubCutaneous before breakfast  levothyroxine 88 MICROGram(s) Oral daily  predniSONE   Tablet 20 milliGRAM(s) Oral daily  rosuvastatin 10 milliGRAM(s) Oral at bedtime      insulin lispro (ADMELOG) corrective regimen sliding scale   SubCutaneous three times a day before meals  insulin lispro (ADMELOG) corrective regimen sliding scale   SubCutaneous <User Schedule>  insulin lispro Injectable (ADMELOG) 13 Unit(s) SubCutaneous with dinner  insulin lispro Injectable (ADMELOG) 16 Unit(s) SubCutaneous before lunch  insulin lispro Injectable (ADMELOG) 16 Unit(s) SubCutaneous before breakfast      PHYSICAL EXAM:  VITALS:   T(C): 37.2 (02-05-25 @ 09:04), Max: 37.2 (02-04-25 @ 16:47)  HR: 69 (02-05-25 @ 09:04) (69 - 80)  BP: 137/66 (02-05-25 @ 09:04) (121/69 - 137/73)  RR: 18 (02-05-25 @ 09:04) (18 - 18)  SpO2: 99% (02-05-25 @ 09:04) (96% - 99%)    GENERAL: In no acute distress  Respiratory: Respirations unlabored  Extremities: Warm and dry, no edema  NEURO: Alert and oriented, appropriate     LABS:  POCT Blood Glucose.: 384 mg/dL (02-05-25 @ 10:00)  POCT Blood Glucose.: 330 mg/dL (02-05-25 @ 08:37)  POCT Blood Glucose.: 287 mg/dL (02-05-25 @ 01:48)  POCT Blood Glucose.: 247 mg/dL (02-04-25 @ 21:03)  POCT Blood Glucose.: 217 mg/dL (02-04-25 @ 17:19)  POCT Blood Glucose.: 213 mg/dL (02-04-25 @ 12:01)  POCT Blood Glucose.: 212 mg/dL (02-04-25 @ 09:01)  POCT Blood Glucose.: 326 mg/dL (02-04-25 @ 01:19)  POCT Blood Glucose.: 391 mg/dL (02-03-25 @ 21:34)  POCT Blood Glucose.: 203 mg/dL (02-03-25 @ 17:21)  POCT Blood Glucose.: 159 mg/dL (02-03-25 @ 12:03)  POCT Blood Glucose.: 142 mg/dL (02-03-25 @ 08:29)  POCT Blood Glucose.: 110 mg/dL (02-03-25 @ 03:18)  POCT Blood Glucose.: 159 mg/dL (02-02-25 @ 22:50)  POCT Blood Glucose.: 213 mg/dL (02-02-25 @ 21:03)  POCT Blood Glucose.: 279 mg/dL (02-02-25 @ 20:11)  POCT Blood Glucose.: 253 mg/dL (02-02-25 @ 18:43)  POCT Blood Glucose.: 298 mg/dL (02-02-25 @ 17:13)  POCT Blood Glucose.: 210 mg/dL (02-02-25 @ 13:42)                          9.1    7.88  )-----------( 256      ( 05 Feb 2025 07:41 )             29.2     02-05    141  |  104  |  37[H]  ----------------------------<  265[H]  4.4   |  26  |  0.77    Ca    9.8      05 Feb 2025 07:40  Phos  2.9     02-05  Mg     2.0     02-05    TPro  5.8[L]  /  Alb  2.8[L]  /  TBili  0.4  /  DBili  x   /  AST  23  /  ALT  25  /  AlkPhos  94  02-04    LIVER FUNCTIONS - ( 04 Feb 2025 07:41 )  Alb: 2.8 g/dL / Pro: 5.8 g/dL / ALK PHOS: 94 U/L / ALT: 25 U/L / AST: 23 U/L / GGT: x             Urinalysis Basic - ( 05 Feb 2025 07:40 )    Color: x / Appearance: x / SG: x / pH: x  Gluc: 265 mg/dL / Ketone: x  / Bili: x / Urobili: x   Blood: x / Protein: x / Nitrite: x   Leuk Esterase: x / RBC: x / WBC x   Sq Epi: x / Non Sq Epi: x / Bacteria: x      A1C with Estimated Average Glucose Result: A1C with Estimated Average Glucose Result: 8.4 % (01-28-25 @ 07:38)  A1C with Estimated Average Glucose Result: 7.8 % (12-29-24 @ 07:07)  A1C with Estimated Average Glucose Result: 7.7 % (12-28-24 @ 10:06)  A1C with Estimated Average Glucose Result: 7.4 % (11-30-24 @ 06:40)     Patient seen today for follow up inpatient Diabetes Mellitus management.    Chief Complaint: Type 2 Diabetes Mellitus    INTERVAL HX:  Patient seen in [facility] 9MON 918 W1. Patient is alert and oriented, resting in bed. Patient reports feeling good, has been eating full meals and tolerating POs. FBG with severe hyperglycemia this am to 330mg/dL, 265mg/dL on blood serum. Noted BG 380mg/dL checked at 1000, patient had already eaten his breakfast as noted this am at bedside around 0930. Severe hyperglycemia at noon to 428mg/dL. Patient denies eating any OSH food or snacks overnight, states he eats 'what I am given here only'. Postprandial hyperglycemia noted last evening to 247mg/dL. Continues on oral Pred 10mg once daily. Continues on Pred 20mg once daily. Blood glucose levels in the last 24hrs have been 213-380mg/dL.     Review of Systems:  General: As above.  Respiratory: Denies any SOB, GRAY, or cough.  Gastrointestinal: Denies any n/v/d or abdominal pain.   Endocrine: Denies any polyuria, polydipsia, polyphagia, visual changes, or numbness in feet.     Allergies  No Known Allergies      Intolerances  None.       MEDICATIONS  (STANDING):  acetaminophen     Tablet .. 650 milliGRAM(s) Oral every 6 hours PRN  amLODIPine   Tablet 10 milliGRAM(s) Oral daily  artificial tears (preservative free) Ophthalmic Solution 1 Drop(s) Both EYES every 6 hours  atovaquone  Suspension 750 milliGRAM(s) Oral every 12 hours  bisacodyl Suppository 10 milliGRAM(s) Rectal once PRN  buPROPion XL (24-Hour) . 300 milliGRAM(s) Oral daily  carvedilol 12.5 milliGRAM(s) Oral every 12 hours  chlorhexidine 4% Liquid 1 Application(s) Topical <User Schedule>  cloNIDine 0.1 milliGRAM(s) Oral three times a day  dextrose 5%. 1000 milliLiter(s) IV Continuous <Continuous>  dextrose 5%. 1000 milliLiter(s) IV Continuous <Continuous>  dextrose 50% Injectable 25 Gram(s) IV Push once  dextrose Oral Gel 15 Gram(s) Oral once PRN  enoxaparin Injectable 40 milliGRAM(s) SubCutaneous <User Schedule>  escitalopram 10 milliGRAM(s) Oral daily  glucagon  Injectable 1 milliGRAM(s) IntraMuscular once  hydrALAZINE 100 milliGRAM(s) Oral every 8 hours  insulin glargine Injectable (LANTUS) 8 Unit(s) SubCutaneous at bedtime  insulin lispro (ADMELOG) corrective regimen sliding scale   SubCutaneous <User Schedule>  insulin lispro (ADMELOG) corrective regimen sliding scale   SubCutaneous three times a day before meals  insulin lispro Injectable (ADMELOG) 13 Unit(s) SubCutaneous with dinner  insulin lispro Injectable (ADMELOG) 16 Unit(s) SubCutaneous before lunch  insulin lispro Injectable (ADMELOG) 16 Unit(s) SubCutaneous before breakfast  lactulose Syrup 15 Gram(s) Oral every 8 hours  levothyroxine 88 MICROGram(s) Oral daily  pantoprazole    Tablet 40 milliGRAM(s) Oral before breakfast  polyethylene glycol 3350 17 Gram(s) Oral every 12 hours  predniSONE   Tablet 20 milliGRAM(s) Oral daily  rosuvastatin 10 milliGRAM(s) Oral at bedtime  senna 2 Tablet(s) Oral at bedtime  sodium chloride 0.9% lock flush 10 milliLiter(s) IV Push every 1 hour PRN  valACYclovir 500 milliGRAM(s) Oral every 12 hours      dextrose 50% Injectable 25 Gram(s) IV Push once  dextrose Oral Gel 15 Gram(s) Oral once PRN  glucagon  Injectable 1 milliGRAM(s) IntraMuscular once  insulin glargine Injectable (LANTUS) 8 Unit(s) SubCutaneous at bedtime  insulin lispro (ADMELOG) corrective regimen sliding scale   SubCutaneous three times a day before meals  insulin lispro (ADMELOG) corrective regimen sliding scale   SubCutaneous <User Schedule>  insulin lispro Injectable (ADMELOG) 13 Unit(s) SubCutaneous with dinner  insulin lispro Injectable (ADMELOG) 16 Unit(s) SubCutaneous before lunch  insulin lispro Injectable (ADMELOG) 16 Unit(s) SubCutaneous before breakfast  levothyroxine 88 MICROGram(s) Oral daily  predniSONE   Tablet 20 milliGRAM(s) Oral daily  rosuvastatin 10 milliGRAM(s) Oral at bedtime      insulin lispro (ADMELOG) corrective regimen sliding scale   SubCutaneous three times a day before meals  insulin lispro (ADMELOG) corrective regimen sliding scale   SubCutaneous <User Schedule>  insulin lispro Injectable (ADMELOG) 13 Unit(s) SubCutaneous with dinner  insulin lispro Injectable (ADMELOG) 16 Unit(s) SubCutaneous before lunch  insulin lispro Injectable (ADMELOG) 16 Unit(s) SubCutaneous before breakfast      PHYSICAL EXAM:  VITALS:   T(C): 37.2 (02-05-25 @ 09:04), Max: 37.2 (02-04-25 @ 16:47)  HR: 69 (02-05-25 @ 09:04) (69 - 80)  BP: 137/66 (02-05-25 @ 09:04) (121/69 - 137/73)  RR: 18 (02-05-25 @ 09:04) (18 - 18)  SpO2: 99% (02-05-25 @ 09:04) (96% - 99%)    GENERAL: In no acute distress  Respiratory: Respirations unlabored  Extremities: Warm and dry, no edema  NEURO: Alert and oriented, appropriate     LABS:  POCT Blood Glucose.: 384 mg/dL (02-05-25 @ 10:00)  POCT Blood Glucose.: 330 mg/dL (02-05-25 @ 08:37)  POCT Blood Glucose.: 287 mg/dL (02-05-25 @ 01:48)  POCT Blood Glucose.: 247 mg/dL (02-04-25 @ 21:03)  POCT Blood Glucose.: 217 mg/dL (02-04-25 @ 17:19)  POCT Blood Glucose.: 213 mg/dL (02-04-25 @ 12:01)  POCT Blood Glucose.: 212 mg/dL (02-04-25 @ 09:01)  POCT Blood Glucose.: 326 mg/dL (02-04-25 @ 01:19)  POCT Blood Glucose.: 391 mg/dL (02-03-25 @ 21:34)  POCT Blood Glucose.: 203 mg/dL (02-03-25 @ 17:21)  POCT Blood Glucose.: 159 mg/dL (02-03-25 @ 12:03)  POCT Blood Glucose.: 142 mg/dL (02-03-25 @ 08:29)  POCT Blood Glucose.: 110 mg/dL (02-03-25 @ 03:18)  POCT Blood Glucose.: 159 mg/dL (02-02-25 @ 22:50)  POCT Blood Glucose.: 213 mg/dL (02-02-25 @ 21:03)  POCT Blood Glucose.: 279 mg/dL (02-02-25 @ 20:11)  POCT Blood Glucose.: 253 mg/dL (02-02-25 @ 18:43)  POCT Blood Glucose.: 298 mg/dL (02-02-25 @ 17:13)  POCT Blood Glucose.: 210 mg/dL (02-02-25 @ 13:42)                          9.1    7.88  )-----------( 256      ( 05 Feb 2025 07:41 )             29.2     02-05    141  |  104  |  37[H]  ----------------------------<  265[H]  4.4   |  26  |  0.77    Ca    9.8      05 Feb 2025 07:40  Phos  2.9     02-05  Mg     2.0     02-05    TPro  5.8[L]  /  Alb  2.8[L]  /  TBili  0.4  /  DBili  x   /  AST  23  /  ALT  25  /  AlkPhos  94  02-04    LIVER FUNCTIONS - ( 04 Feb 2025 07:41 )  Alb: 2.8 g/dL / Pro: 5.8 g/dL / ALK PHOS: 94 U/L / ALT: 25 U/L / AST: 23 U/L / GGT: x             Urinalysis Basic - ( 05 Feb 2025 07:40 )    Color: x / Appearance: x / SG: x / pH: x  Gluc: 265 mg/dL / Ketone: x  / Bili: x / Urobili: x   Blood: x / Protein: x / Nitrite: x   Leuk Esterase: x / RBC: x / WBC x   Sq Epi: x / Non Sq Epi: x / Bacteria: x      A1C with Estimated Average Glucose Result: A1C with Estimated Average Glucose Result: 8.4 % (01-28-25 @ 07:38)  A1C with Estimated Average Glucose Result: 7.8 % (12-29-24 @ 07:07)  A1C with Estimated Average Glucose Result: 7.7 % (12-28-24 @ 10:06)  A1C with Estimated Average Glucose Result: 7.4 % (11-30-24 @ 06:40)

## 2025-02-05 NOTE — PROGRESS NOTE ADULT - PROBLEM SELECTOR PLAN 2
Patient on oral Prednisone taper now.     - S/p oral Pred 40mg BID x2 days (1/27-1/28)  - S/p oral Pred 40mg once daily x5 days (1/29-2/2)  - Now on oral Pred 20mg once daily x11 days (until 2/13)

## 2025-02-05 NOTE — PROGRESS NOTE ADULT - ASSESSMENT
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 dose prednisone- noted plans to d/c on pred 20 mg daily    hold FK and imuran at this time  continue pred per ID  When prednisone is weaned down to 5 mg daily, can start FK 1/1 to target level of ~ 2    follow with his transplant nephrologist in [place]

## 2025-02-05 NOTE — PROGRESS NOTE ADULT - ASSESSMENT
69-year-old male with PMHx of DM, HTN, HLD, ESRD on HD s/p LKRT 2012 (from brother), hypothyroidism, recent admission to [facility] 11/30/24-12/18/24 for AMS found to have sacral OM and E. Coli bacteremia s/p treatment with meropenem till 12/10, complicated by hypercalcemia  (s/p calcitonin, aredia, and started on prednisone by nephrology), found to have new DLBCL on liver biopsy 12/16/24 transferred to [facility] from rehab to start chemo with  Mini R-CHOP. S/p C1 RCHOP on 12/2024 (rituxan on 12/28/24 and CHOP on 12/29/24). Pt was discharged on 1/1/25. Pt was readmitted to [facility] on 1/17/25now admitted for cycle #2 R-mini CHOP, upon admission patient is febrile and Chemotherapy was held. Pt has been pancytopenic secondary to chemotherapy as well as infection. Course c/b AHRF and c/f PJP Pneumonia.       #DLBCL   Admitted  for R mini CHOP, found to be febrile on admission, held  chemo   Plans for any chemo or tafasitamab/lenalidomide are now ON HOLD given severe infections and fevers.  1/25 stable on HFNC. cont to hold planned therapy due to suspected PCP/PJC pneumonia  1/27 stable on HFNC, O2 requirements less, now 50% FiO2 and 50 LPM. No further plan for chemo and/or immunotherapy at present. Request made to transfer to medicine floor with Hospitalist coverage - accepted by  1/28 Medicine service Doug Laura will take over care when pt moved to [address] [place] [address].   1/30 Currently on NC 2L. Labs today- WBC 12.21, Hgb 7.6, Plt 428  - Daily CBC w diff, transfuse to keep Hgb>7, Platelet > 10k, if bleeding keep platelet > 50k  - Will arrange an outpatient follow up at [facility] with Dr. Medina regarding further chemotherapy. No plan for inpatient chemotherapy.      # AHRF  # PJP Pneumonia  s/p zosyn 1/17-1/20  s/p ertapenem 1/20- 1/24  s/p vanc x1 on 1/24  s/p meropenem 1/24-1/26   - started IV bactrim + prednisone 1/24-  - ID following, recs appreciated  -Bactrim 320mg IV Q8h (based on pts weight) - plan for 21d course (1/24-2/13)  -s/p Prednisone 40mg PO BID x5d until 1/28 , c/w 40mg daily x5d (1/29-2/2), then 20mg daily for 11 days (2/3-2/13)  -Continue on valtrex ppx  -wean O2 as tolerated currently on 2L NC  -f/u sputum culture and sputum for PCP PCR if able to expectorate   -f/u aspergillus Ag, in process       # Kidney transplant  T RISK FOR ADRENAL INSUFFIENCEY IF HYPOTENSIVE 50mg hydrocortisone IV q8  s/p Hydrocortisone 25 mg IV, now off--on pred taper for PJP PNA  1/26 D/C Tacrolimus and Lisinopril due to DIMITRIOS and PCP.   Transplant nephro / Transplant ID following            Please call via MS Teams with any questions.  Above reviewed with Attending     Discussed with primary team.

## 2025-02-06 VITALS
TEMPERATURE: 98 F | DIASTOLIC BLOOD PRESSURE: 66 MMHG | HEART RATE: 69 BPM | SYSTOLIC BLOOD PRESSURE: 116 MMHG | OXYGEN SATURATION: 100 % | RESPIRATION RATE: 18 BRPM

## 2025-02-06 PROBLEM — E11.9 TYPE 2 DIABETES MELLITUS WITHOUT COMPLICATIONS: Chronic | Status: ACTIVE | Noted: 2025-01-17

## 2025-02-06 PROBLEM — C83.30 DIFFUSE LARGE B-CELL LYMPHOMA, UNSPECIFIED SITE: Chronic | Status: ACTIVE | Noted: 2025-01-17

## 2025-02-06 PROBLEM — Z94.0 KIDNEY TRANSPLANT STATUS: Chronic | Status: ACTIVE | Noted: 2025-01-17

## 2025-02-06 PROBLEM — B99.9 UNSPECIFIED INFECTIOUS DISEASE: Chronic | Status: ACTIVE | Noted: 2025-01-17

## 2025-02-06 PROBLEM — E03.9 HYPOTHYROIDISM, UNSPECIFIED: Chronic | Status: ACTIVE | Noted: 2025-01-17

## 2025-02-06 LAB
ANION GAP SERPL CALC-SCNC: 9 MMOL/L — SIGNIFICANT CHANGE UP (ref 5–17)
BASOPHILS # BLD AUTO: 0.03 K/UL — SIGNIFICANT CHANGE UP (ref 0–0.2)
BASOPHILS NFR BLD AUTO: 0.4 % — SIGNIFICANT CHANGE UP (ref 0–2)
BUN SERPL-MCNC: 38 MG/DL — HIGH (ref 7–23)
CALCIUM SERPL-MCNC: 9.8 MG/DL — SIGNIFICANT CHANGE UP (ref 8.4–10.5)
CHLORIDE SERPL-SCNC: 104 MMOL/L — SIGNIFICANT CHANGE UP (ref 96–108)
CO2 SERPL-SCNC: 27 MMOL/L — SIGNIFICANT CHANGE UP (ref 22–31)
CREAT SERPL-MCNC: 0.81 MG/DL — SIGNIFICANT CHANGE UP (ref 0.5–1.3)
EGFR: 97 ML/MIN/1.73M2 — SIGNIFICANT CHANGE UP
EOSINOPHIL # BLD AUTO: 0.17 K/UL — SIGNIFICANT CHANGE UP (ref 0–0.5)
EOSINOPHIL NFR BLD AUTO: 2.4 % — SIGNIFICANT CHANGE UP (ref 0–6)
GLUCOSE BLDC GLUCOMTR-MCNC: 186 MG/DL — HIGH (ref 70–99)
GLUCOSE BLDC GLUCOMTR-MCNC: 257 MG/DL — HIGH (ref 70–99)
GLUCOSE BLDC GLUCOMTR-MCNC: 300 MG/DL — HIGH (ref 70–99)
GLUCOSE BLDC GLUCOMTR-MCNC: 312 MG/DL — HIGH (ref 70–99)
GLUCOSE BLDC GLUCOMTR-MCNC: 88 MG/DL — SIGNIFICANT CHANGE UP (ref 70–99)
GLUCOSE SERPL-MCNC: 190 MG/DL — HIGH (ref 70–99)
HCT VFR BLD CALC: 29 % — LOW (ref 39–50)
HGB BLD-MCNC: 9 G/DL — LOW (ref 13–17)
IMM GRANULOCYTES NFR BLD AUTO: 1.7 % — HIGH (ref 0–0.9)
LYMPHOCYTES # BLD AUTO: 0.52 K/UL — LOW (ref 1–3.3)
LYMPHOCYTES # BLD AUTO: 7.3 % — LOW (ref 13–44)
MAGNESIUM SERPL-MCNC: 1.8 MG/DL — SIGNIFICANT CHANGE UP (ref 1.6–2.6)
MCHC RBC-ENTMCNC: 30.3 PG — SIGNIFICANT CHANGE UP (ref 27–34)
MCHC RBC-ENTMCNC: 31 G/DL — LOW (ref 32–36)
MCV RBC AUTO: 97.6 FL — SIGNIFICANT CHANGE UP (ref 80–100)
MONOCYTES # BLD AUTO: 0.55 K/UL — SIGNIFICANT CHANGE UP (ref 0–0.9)
MONOCYTES NFR BLD AUTO: 7.8 % — SIGNIFICANT CHANGE UP (ref 2–14)
NEUTROPHILS # BLD AUTO: 5.69 K/UL — SIGNIFICANT CHANGE UP (ref 1.8–7.4)
NEUTROPHILS NFR BLD AUTO: 80.4 % — HIGH (ref 43–77)
NRBC # BLD: 0 /100 WBCS — SIGNIFICANT CHANGE UP (ref 0–0)
NRBC BLD-RTO: 0 /100 WBCS — SIGNIFICANT CHANGE UP (ref 0–0)
PHOSPHATE SERPL-MCNC: 2.1 MG/DL — LOW (ref 2.5–4.5)
PLATELET # BLD AUTO: 237 K/UL — SIGNIFICANT CHANGE UP (ref 150–400)
POTASSIUM SERPL-MCNC: 3.9 MMOL/L — SIGNIFICANT CHANGE UP (ref 3.5–5.3)
POTASSIUM SERPL-SCNC: 3.9 MMOL/L — SIGNIFICANT CHANGE UP (ref 3.5–5.3)
RBC # BLD: 2.97 M/UL — LOW (ref 4.2–5.8)
RBC # FLD: 19.9 % — HIGH (ref 10.3–14.5)
SODIUM SERPL-SCNC: 140 MMOL/L — SIGNIFICANT CHANGE UP (ref 135–145)
WBC # BLD: 7.08 K/UL — SIGNIFICANT CHANGE UP (ref 3.8–10.5)
WBC # FLD AUTO: 7.08 K/UL — SIGNIFICANT CHANGE UP (ref 3.8–10.5)

## 2025-02-06 PROCEDURE — 87086 URINE CULTURE/COLONY COUNT: CPT

## 2025-02-06 PROCEDURE — 87077 CULTURE AEROBIC IDENTIFY: CPT

## 2025-02-06 PROCEDURE — 84550 ASSAY OF BLOOD/URIC ACID: CPT

## 2025-02-06 PROCEDURE — 87637 SARSCOV2&INF A&B&RSV AMP PRB: CPT

## 2025-02-06 PROCEDURE — 85610 PROTHROMBIN TIME: CPT

## 2025-02-06 PROCEDURE — 82330 ASSAY OF CALCIUM: CPT

## 2025-02-06 PROCEDURE — 97167 OT EVAL HIGH COMPLEX 60 MIN: CPT

## 2025-02-06 PROCEDURE — 84295 ASSAY OF SERUM SODIUM: CPT

## 2025-02-06 PROCEDURE — 83880 ASSAY OF NATRIURETIC PEPTIDE: CPT

## 2025-02-06 PROCEDURE — 86901 BLOOD TYPING SEROLOGIC RH(D): CPT

## 2025-02-06 PROCEDURE — 82610 CYSTATIN C: CPT

## 2025-02-06 PROCEDURE — 82787 IGG 1 2 3 OR 4 EACH: CPT

## 2025-02-06 PROCEDURE — 82803 BLOOD GASES ANY COMBINATION: CPT

## 2025-02-06 PROCEDURE — 86663 EPSTEIN-BARR ANTIBODY: CPT

## 2025-02-06 PROCEDURE — 81003 URINALYSIS AUTO W/O SCOPE: CPT

## 2025-02-06 PROCEDURE — 86850 RBC ANTIBODY SCREEN: CPT

## 2025-02-06 PROCEDURE — 36430 TRANSFUSION BLD/BLD COMPNT: CPT

## 2025-02-06 PROCEDURE — 86665 EPSTEIN-BARR CAPSID VCA: CPT

## 2025-02-06 PROCEDURE — 82962 GLUCOSE BLOOD TEST: CPT

## 2025-02-06 PROCEDURE — 85025 COMPLETE CBC W/AUTO DIFF WBC: CPT

## 2025-02-06 PROCEDURE — 80048 BASIC METABOLIC PNL TOTAL CA: CPT

## 2025-02-06 PROCEDURE — 82947 ASSAY GLUCOSE BLOOD QUANT: CPT

## 2025-02-06 PROCEDURE — 87305 ASPERGILLUS AG IA: CPT

## 2025-02-06 PROCEDURE — 85014 HEMATOCRIT: CPT

## 2025-02-06 PROCEDURE — 97162 PT EVAL MOD COMPLEX 30 MIN: CPT

## 2025-02-06 PROCEDURE — 87040 BLOOD CULTURE FOR BACTERIA: CPT

## 2025-02-06 PROCEDURE — 81001 URINALYSIS AUTO W/SCOPE: CPT

## 2025-02-06 PROCEDURE — 83605 ASSAY OF LACTIC ACID: CPT

## 2025-02-06 PROCEDURE — P9040: CPT

## 2025-02-06 PROCEDURE — 85730 THROMBOPLASTIN TIME PARTIAL: CPT

## 2025-02-06 PROCEDURE — 86900 BLOOD TYPING SEROLOGIC ABO: CPT

## 2025-02-06 PROCEDURE — 82435 ASSAY OF BLOOD CHLORIDE: CPT

## 2025-02-06 PROCEDURE — 86664 EPSTEIN-BARR NUCLEAR ANTIGEN: CPT

## 2025-02-06 PROCEDURE — 93306 TTE W/DOPPLER COMPLETE: CPT

## 2025-02-06 PROCEDURE — 82550 ASSAY OF CK (CPK): CPT

## 2025-02-06 PROCEDURE — 84132 ASSAY OF SERUM POTASSIUM: CPT

## 2025-02-06 PROCEDURE — 85027 COMPLETE CBC AUTOMATED: CPT

## 2025-02-06 PROCEDURE — 82784 ASSAY IGA/IGD/IGG/IGM EACH: CPT

## 2025-02-06 PROCEDURE — 84100 ASSAY OF PHOSPHORUS: CPT

## 2025-02-06 PROCEDURE — 97112 NEUROMUSCULAR REEDUCATION: CPT

## 2025-02-06 PROCEDURE — 84145 PROCALCITONIN (PCT): CPT

## 2025-02-06 PROCEDURE — 83036 HEMOGLOBIN GLYCOSYLATED A1C: CPT

## 2025-02-06 PROCEDURE — 71045 X-RAY EXAM CHEST 1 VIEW: CPT

## 2025-02-06 PROCEDURE — 93005 ELECTROCARDIOGRAM TRACING: CPT

## 2025-02-06 PROCEDURE — 85379 FIBRIN DEGRADATION QUANT: CPT

## 2025-02-06 PROCEDURE — 97530 THERAPEUTIC ACTIVITIES: CPT

## 2025-02-06 PROCEDURE — 85384 FIBRINOGEN ACTIVITY: CPT

## 2025-02-06 PROCEDURE — 86923 COMPATIBILITY TEST ELECTRIC: CPT

## 2025-02-06 PROCEDURE — 87449 NOS EACH ORGANISM AG IA: CPT

## 2025-02-06 PROCEDURE — 87640 STAPH A DNA AMP PROBE: CPT

## 2025-02-06 PROCEDURE — 97110 THERAPEUTIC EXERCISES: CPT

## 2025-02-06 PROCEDURE — 85018 HEMOGLOBIN: CPT

## 2025-02-06 PROCEDURE — 83735 ASSAY OF MAGNESIUM: CPT

## 2025-02-06 PROCEDURE — 80053 COMPREHEN METABOLIC PANEL: CPT

## 2025-02-06 PROCEDURE — 71250 CT THORAX DX C-: CPT | Mod: MC

## 2025-02-06 PROCEDURE — 87641 MR-STAPH DNA AMP PROBE: CPT

## 2025-02-06 PROCEDURE — 0225U NFCT DS DNA&RNA 21 SARSCOV2: CPT

## 2025-02-06 PROCEDURE — 36415 COLL VENOUS BLD VENIPUNCTURE: CPT

## 2025-02-06 PROCEDURE — 73522 X-RAY EXAM HIPS BI 3-4 VIEWS: CPT

## 2025-02-06 PROCEDURE — 82010 KETONE BODYS QUAN: CPT

## 2025-02-06 PROCEDURE — 99239 HOSP IP/OBS DSCHRG MGMT >30: CPT

## 2025-02-06 PROCEDURE — 83615 LACTATE (LD) (LDH) ENZYME: CPT

## 2025-02-06 PROCEDURE — 0241U: CPT

## 2025-02-06 PROCEDURE — 94640 AIRWAY INHALATION TREATMENT: CPT

## 2025-02-06 PROCEDURE — 74176 CT ABD & PELVIS W/O CONTRAST: CPT | Mod: MC

## 2025-02-06 RX ORDER — INSULIN LISPRO 100/ML
24 VIAL (ML) SUBCUTANEOUS
Refills: 0 | Status: DISCONTINUED | OUTPATIENT
Start: 2025-02-06 | End: 2025-02-06

## 2025-02-06 RX ORDER — INSULIN GLARGINE-YFGN 100 [IU]/ML
11 INJECTION, SOLUTION SUBCUTANEOUS AT BEDTIME
Refills: 0 | Status: DISCONTINUED | OUTPATIENT
Start: 2025-02-06 | End: 2025-02-06

## 2025-02-06 RX ORDER — SODIUM PHOSPHATE, DIBASIC, ANHYDROUS, POTASSIUM PHOSPHATE, MONOBASIC, AND SODIUM PHOSPHATE, MONOBASIC, MONOHYDRATE 852; 155; 130 MG/1; MG/1; MG/1
1 TABLET, COATED ORAL ONCE
Refills: 0 | Status: COMPLETED | OUTPATIENT
Start: 2025-02-06 | End: 2025-02-06

## 2025-02-06 RX ADMIN — Medication 2: at 02:31

## 2025-02-06 RX ADMIN — Medication 20 UNIT(S): at 09:21

## 2025-02-06 RX ADMIN — BUPROPION HYDROCHLORIDE 300 MILLIGRAM(S): 150 TABLET, EXTENDED RELEASE ORAL at 12:51

## 2025-02-06 RX ADMIN — Medication 14 UNIT(S): at 17:51

## 2025-02-06 RX ADMIN — Medication 2: at 12:49

## 2025-02-06 RX ADMIN — PANTOPRAZOLE 40 MILLIGRAM(S): 20 TABLET, DELAYED RELEASE ORAL at 05:41

## 2025-02-06 RX ADMIN — PREDNISONE 20 MILLIGRAM(S): 5 TABLET ORAL at 05:41

## 2025-02-06 RX ADMIN — ATOVAQUONE 750 MILLIGRAM(S): 750 SUSPENSION ORAL at 05:42

## 2025-02-06 RX ADMIN — Medication 1 DROP(S): at 18:00

## 2025-02-06 RX ADMIN — Medication 12.5 MILLIGRAM(S): at 17:52

## 2025-02-06 RX ADMIN — ANTISEPTIC SURGICAL SCRUB 1 APPLICATION(S): 0.04 SOLUTION TOPICAL at 09:28

## 2025-02-06 RX ADMIN — Medication 18 UNIT(S): at 12:50

## 2025-02-06 RX ADMIN — Medication 1 DROP(S): at 12:51

## 2025-02-06 RX ADMIN — VALACYCLOVIR 500 MILLIGRAM(S): 1000 TABLET ORAL at 05:41

## 2025-02-06 RX ADMIN — VALACYCLOVIR 500 MILLIGRAM(S): 1000 TABLET ORAL at 17:52

## 2025-02-06 RX ADMIN — SODIUM PHOSPHATE, DIBASIC, ANHYDROUS, POTASSIUM PHOSPHATE, MONOBASIC, AND SODIUM PHOSPHATE, MONOBASIC, MONOHYDRATE 1 PACKET(S): 852; 155; 130 TABLET, COATED ORAL at 09:27

## 2025-02-06 RX ADMIN — Medication 12.5 MILLIGRAM(S): at 05:41

## 2025-02-06 RX ADMIN — Medication 10 MILLIGRAM(S): at 05:41

## 2025-02-06 RX ADMIN — POLYETHYLENE GLYCOL 3350 17 GRAM(S): 17 POWDER, FOR SOLUTION ORAL at 17:52

## 2025-02-06 RX ADMIN — Medication 15 GRAM(S): at 05:42

## 2025-02-06 RX ADMIN — Medication 1 DROP(S): at 05:42

## 2025-02-06 RX ADMIN — Medication 15 GRAM(S): at 13:43

## 2025-02-06 RX ADMIN — ESCITALOPRAM 10 MILLIGRAM(S): 10 TABLET, FILM COATED ORAL at 12:51

## 2025-02-06 RX ADMIN — Medication 6: at 09:20

## 2025-02-06 RX ADMIN — ATOVAQUONE 750 MILLIGRAM(S): 750 SUSPENSION ORAL at 17:52

## 2025-02-06 RX ADMIN — Medication 100 MILLIGRAM(S): at 05:42

## 2025-02-06 RX ADMIN — CLONIDINE HYDROCHLORIDE 0.1 MILLIGRAM(S): 0.2 TABLET ORAL at 05:41

## 2025-02-06 RX ADMIN — LEVOTHYROXINE SODIUM 88 MICROGRAM(S): 25 TABLET ORAL at 05:41

## 2025-02-06 NOTE — PROGRESS NOTE ADULT - NUTRITIONAL ASSESSMENT
This patient has been assessed with a concern for Malnutrition and has been determined to have a diagnosis/diagnoses of Moderate protein-calorie malnutrition.    This patient is being managed with:   Diet Consistent Carbohydrate/No Snacks-  Entered: Feb 5 2025 12:28PM

## 2025-02-06 NOTE — PROGRESS NOTE ADULT - PROBLEM SELECTOR PLAN 7
Continue Crestor
Continue Crestor.
Continue Crestor.
Continue Crestor
Continue Crestor
Continue Crestor.
Continue Crestor
Continue Crestor.
Continue Crestor.
Continue Crestor
Continue Crestor.

## 2025-02-06 NOTE — PROGRESS NOTE ADULT - PROBLEM SELECTOR PROBLEM 5
Kidney transplanted
Hyperlipidemia
Kidney transplanted
Steroid-induced hyperglycemia
Steroid-induced hyperglycemia
Kidney transplanted
Kidney transplanted
Hyperlipidemia
Kidney transplanted

## 2025-02-06 NOTE — PROGRESS NOTE ADULT - ATTENDING SUPERVISION STATEMENT
Resident
Resident
Fellow
Resident

## 2025-02-06 NOTE — PROGRESS NOTE ADULT - REASON FOR ADMISSION
"For chemo"
Chemo, hyperglycemia
"For chemo"

## 2025-02-06 NOTE — PROGRESS NOTE ADULT - PROBLEM SELECTOR PLAN 4
- Goal BP <130/80  - on hydralazine, clonidine, coreg  - Management as per primary team  - check urine albumin level as outpatient
- Goal BP <130/80  - on hydralazine, clonidine, coreg  - Management as per primary team  - check urine albumin level as outpatient
Continue Levothyroxine
Continue Levothyroxine
Continue Levothyroxine.
Continue Levothyroxine
- Goal BP <130/80  - on hydralazine, clonidine, coreg  - Management as per primary team  - check urine albumin level as outpatient
- Goal BP <130/80  - on hydralazine, clonidine, coreg  - Management as per primary team  - check urine albumin level as outpatient
Continue Levothyroxine.
Continue Levothyroxine
Continue Levothyroxine
- Goal BP <130/80  - on hydralazine, clonidine, coreg  - Management as per primary team  - check urine albumin level as outpatient
Continue Levothyroxine.
- Goal BP <130/80  - on hydralazine, clonidine, coreg  - Management as per primary team  - check urine albumin level as outpatient
Continue Levothyroxine.
Continue Levothyroxine
Continue Levothyroxine.
Continue Levothyroxine
Continue Levothyroxine.
Continue Levothyroxine

## 2025-02-06 NOTE — CHART NOTE - NSCHARTNOTEFT_GEN_A_CORE
NUTRITION FOLLOW UP NOTE    PATIENT SEEN FOR: malnutrition follow up    SOURCE: [x] Patient  [x] Current Medical Record  [x] RN  [] Family/support person at bedside  [] Patient unavailable/inappropriate  [] Other:    CHART REVIEWED/EVENTS NOTED.  [] No changes to nutrition care plan to note  [x] Nutrition Status:  -chemotherapy held, course c/b AHRF and c/f PJP pneumonia  -pending placement at [facility]    DIET ORDER:   Diet, Consistent Carbohydrate/No Snacks (25)    CURRENT DIET ORDER IS:  [x] Appropriate:  [] Inadequate:  [] Other:    NUTRITION INTAKE/PROVISION:  [x] PO: per RN flowsheets, Pt consuming % of most meals  -Pt reports appetite is good, no issues with food tolerance and no preferences offered  -does not want to receive Glucerna (was previously ordered for it  from -)  -aware of consistent carbohydrate restriction; avoid eating outside foods  [] Enteral Nutrition:  [] Parenteral Nutrition:    ANTHROPOMETRICS:  Drug Dosing Weight  Height (cm): 182.9 (2025 10:03)  Weight (kg): 75.6 (2025 10:03)  BMI (kg/m2): 22.6 (2025 10:03)  BSA (m2): 1.97 (2025 10:03)  Weights: no new wts to assess  Daily Weight in k.1 (), 78.2 (), Weight in k.5 (), Weight in k.2 (), Weight in k.6 (), Weight in k.7 (18)   *small wt fluctuations noted    MEDICATIONS:  MEDICATIONS  (STANDING):  amLODIPine   Tablet 10 milliGRAM(s) Oral daily  artificial tears (preservative free) Ophthalmic Solution 1 Drop(s) Both EYES every 6 hours  atovaquone  Suspension 750 milliGRAM(s) Oral every 12 hours  buPROPion XL (24-Hour) . 300 milliGRAM(s) Oral daily  carvedilol 12.5 milliGRAM(s) Oral every 12 hours  chlorhexidine 4% Liquid 1 Application(s) Topical <User Schedule>  cloNIDine 0.1 milliGRAM(s) Oral three times a day  dextrose 5%. 1000 milliLiter(s) (50 mL/Hr) IV Continuous <Continuous>  dextrose 5%. 1000 milliLiter(s) (100 mL/Hr) IV Continuous <Continuous>  dextrose 50% Injectable 25 Gram(s) IV Push once  enoxaparin Injectable 40 milliGRAM(s) SubCutaneous <User Schedule>  escitalopram 10 milliGRAM(s) Oral daily  glucagon  Injectable 1 milliGRAM(s) IntraMuscular once  hydrALAZINE 100 milliGRAM(s) Oral every 8 hours  insulin glargine Injectable (LANTUS) 9 Unit(s) SubCutaneous at bedtime  insulin lispro (ADMELOG) corrective regimen sliding scale   SubCutaneous three times a day before meals  insulin lispro (ADMELOG) corrective regimen sliding scale   SubCutaneous <User Schedule>  insulin lispro Injectable (ADMELOG) 20 Unit(s) SubCutaneous before breakfast  insulin lispro Injectable (ADMELOG) 18 Unit(s) SubCutaneous before lunch  insulin lispro Injectable (ADMELOG) 14 Unit(s) SubCutaneous before dinner  lactulose Syrup 15 Gram(s) Oral every 8 hours  levothyroxine 88 MICROGram(s) Oral daily  pantoprazole    Tablet 40 milliGRAM(s) Oral before breakfast  polyethylene glycol 3350 17 Gram(s) Oral every 12 hours  predniSONE   Tablet 20 milliGRAM(s) Oral daily  rosuvastatin 10 milliGRAM(s) Oral at bedtime  senna 2 Tablet(s) Oral at bedtime  valACYclovir 500 milliGRAM(s) Oral every 12 hours    MEDICATIONS  (PRN):  acetaminophen     Tablet .. 650 milliGRAM(s) Oral every 6 hours PRN Temp greater or equal to 38C (100.4F), Mild Pain (1 - 3)  bisacodyl Suppository 10 milliGRAM(s) Rectal once PRN Constipation  dextrose Oral Gel 15 Gram(s) Oral once PRN Blood Glucose LESS THAN 70 milliGRAM(s)/deciliter  sodium chloride 0.9% lock flush 10 milliLiter(s) IV Push every 1 hour PRN Pre/post blood products, medications, blood draw, and to maintain line patency    NUTRITIONALLY PERTINENT LABS:   Na140 mmol/L Glu 190 mg/dL[H] K+ 3.9 mmol/L Cr  0.81 mg/dL BUN 38 mg/dL[H]  Phos 2.1 mg/dL[L]  Alb 2.8 g/dL[L] ALT 25 U/L AST 23 U/L Alkaline Phosphatase 94 U/L  25 @ 07:38 a1c 8.4    A1C with Estimated Average Glucose Result: 8.4 % (25 @ 07:38)  A1C with Estimated Average Glucose Result: 7.8 % (24 @ 07:07)  A1C with Estimated Average Glucose Result: 7.7 % (24 @ 10:06)  A1C with Estimated Average Glucose Result: 7.4 % (24 @ 06:40)    Finger Sticks:  POCT Blood Glucose.: 312 mg/dL ( @ 09:52)  POCT Blood Glucose.: 300 mg/dL ( @ 09:18)  POCT Blood Glucose.: 257 mg/dL ( @ 02:14)  POCT Blood Glucose.: 187 mg/dL ( @ 21:10)  POCT Blood Glucose.: 147 mg/dL ( @ 19:25)  POCT Blood Glucose.: 90 mg/dL ( @ 18:47)  POCT Blood Glucose.: 89 mg/dL ( @ 18:25)  POCT Blood Glucose.: 174 mg/dL ( @ 15:20)  POCT Blood Glucose.: 325 mg/dL ( @ 13:37)  POCT Blood Glucose.: 428 mg/dL ( @ 12:06)  POCT Blood Glucose.: 431 mg/dL ( @ 12:05)    NUTRITIONALLY PERTINENT MEDICATIONS/LABS:  [x] Reviewed  [x] Relevant notes on medications/labs:  -hyperglycemia noted, likely exacerbated by steroids - ordered for lispro/lantus/ISS and Endocrine following  -elevated HbA1c c/w DM  -bowel regimen: lactulose, senna, and Miralax with bisacodyl PRN    EDEMA:  [x] Reviewed  [] Relevant notes:    GI/ I&O:  [x] Reviewed  [x] Relevant notes: last BM 2/4 per RN flowsheets, documented as "distended"  [] Other:    SKIN:   [] No pressure injuries documented, per nursing flowsheet  [] Pressure injury previously noted  [x] Change in pressure injury documentation: WOCN FU 2/ - unstageable pressure injury b/l buttocks/sacrum/coccyx  [] Other:    ESTIMATED NEEDS:  [x] No change:  [] Updated:  Energy: 6869-3574 kcal/day (28-33 kcal/kg)  Protein:   g/day (1.3-1.5 g/kg)  Fluid:   ml/day or [x] defer to team  Based on: dosing wt 75.6 kg    NUTRITION DIAGNOSIS:  [x] Prior Dx: 1) acute on chronic moderate malnutrition and 2) increased nutrient needs  [] New Dx:    EDUCATION:  [x] Yes: diabetic diet principles - limiting refined sugars, portion sizes, avoidance of outside foods; adequate protein/calorie intake  [] Not appropriate/warranted    NUTRITION CARE PLAN:  1. Diet: continue consistent carbohydrate diet  2. Supplements: continue diet mighty shakes 3x/day (200 kcal, 7 g Pro/4oz)  3. Multivitamin/mineral supplementation: defer to team iso DLBCL  4. defer to Endocrine for glycemic control/insulin adjustments    [] Achieved - Continue current nutrition intervention(s)  [] Current medical condition precludes nutrition intervention at this time.    MONITORING AND EVALUATION:   RD remains available upon request and will follow up per protocol.    Elida Mckeon MPH, RD, CDN  Available on [facility] TEAMS

## 2025-02-06 NOTE — PROGRESS NOTE ADULT - PROBLEM SELECTOR PROBLEM 4
Hypothyroidism
Hyperlipidemia
Hypertension
Hypertension
Hyperlipidemia
Hypertension
Hypothyroidism
Hypertension
Hypertension
Hypothyroidism
Hypothyroidism
Hypertension
Hypothyroidism

## 2025-02-06 NOTE — PROGRESS NOTE ADULT - PROBLEM SELECTOR PLAN 10
Protonix 40 mg daily
Protonix 40 mg daily.
Protonix 40 mg daily
Protonix 40 mg daily
Protonix 40 mg daily.
Protonix 40 mg daily.
Protonix 40 mg daily
Protonix 40 mg daily.
Protonix 40 mg daily
Protonix 40 mg daily
Protonix 40 mg daily.
Protonix 40 mg daily
Protonix 40 mg daily
Protonix 40 mg daily.

## 2025-02-06 NOTE — PROGRESS NOTE ADULT - SUBJECTIVE AND OBJECTIVE BOX
Patient is a 69y old  Male who presents with a chief complaint of "For chemo" (05 Feb 2025 15:15)      SUBJECTIVE / OVERNIGHT EVENTS: Patient seen and examined at bedside. No acute events overnight. Pt denies fevers, chills, chest pain, abdominal pain, n/v/d.    MEDICATIONS  (STANDING):  amLODIPine   Tablet 10 milliGRAM(s) Oral daily  artificial tears (preservative free) Ophthalmic Solution 1 Drop(s) Both EYES every 6 hours  atovaquone  Suspension 750 milliGRAM(s) Oral every 12 hours  buPROPion XL (24-Hour) . 300 milliGRAM(s) Oral daily  carvedilol 12.5 milliGRAM(s) Oral every 12 hours  chlorhexidine 4% Liquid 1 Application(s) Topical <User Schedule>  cloNIDine 0.1 milliGRAM(s) Oral three times a day  dextrose 5%. 1000 milliLiter(s) (50 mL/Hr) IV Continuous <Continuous>  dextrose 5%. 1000 milliLiter(s) (100 mL/Hr) IV Continuous <Continuous>  dextrose 50% Injectable 25 Gram(s) IV Push once  enoxaparin Injectable 40 milliGRAM(s) SubCutaneous <User Schedule>  escitalopram 10 milliGRAM(s) Oral daily  glucagon  Injectable 1 milliGRAM(s) IntraMuscular once  hydrALAZINE 100 milliGRAM(s) Oral every 8 hours  insulin glargine Injectable (LANTUS) 9 Unit(s) SubCutaneous at bedtime  insulin lispro (ADMELOG) corrective regimen sliding scale   SubCutaneous <User Schedule>  insulin lispro (ADMELOG) corrective regimen sliding scale   SubCutaneous three times a day before meals  insulin lispro Injectable (ADMELOG) 20 Unit(s) SubCutaneous before breakfast  insulin lispro Injectable (ADMELOG) 18 Unit(s) SubCutaneous before lunch  insulin lispro Injectable (ADMELOG) 14 Unit(s) SubCutaneous before dinner  lactulose Syrup 15 Gram(s) Oral every 8 hours  levothyroxine 88 MICROGram(s) Oral daily  pantoprazole    Tablet 40 milliGRAM(s) Oral before breakfast  polyethylene glycol 3350 17 Gram(s) Oral every 12 hours  predniSONE   Tablet 20 milliGRAM(s) Oral daily  rosuvastatin 10 milliGRAM(s) Oral at bedtime  senna 2 Tablet(s) Oral at bedtime  valACYclovir 500 milliGRAM(s) Oral every 12 hours    MEDICATIONS  (PRN):  acetaminophen     Tablet .. 650 milliGRAM(s) Oral every 6 hours PRN Temp greater or equal to 38C (100.4F), Mild Pain (1 - 3)  bisacodyl Suppository 10 milliGRAM(s) Rectal once PRN Constipation  dextrose Oral Gel 15 Gram(s) Oral once PRN Blood Glucose LESS THAN 70 milliGRAM(s)/deciliter  sodium chloride 0.9% lock flush 10 milliLiter(s) IV Push every 1 hour PRN Pre/post blood products, medications, blood draw, and to maintain line patency      Vital Signs Last 24 Hrs  T(C): 36.7 (06 Feb 2025 05:22), Max: 37.2 (05 Feb 2025 09:04)  T(F): 98.1 (06 Feb 2025 05:22), Max: 98.9 (05 Feb 2025 09:04)  HR: 72 (06 Feb 2025 05:22) (69 - 80)  BP: 153/64 (06 Feb 2025 05:22) (115/68 - 153/64)  BP(mean): --  RR: 18 (06 Feb 2025 05:22) (18 - 18)  SpO2: 97% (06 Feb 2025 05:22) (97% - 99%)    Parameters below as of 06 Feb 2025 05:22  Patient On (Oxygen Delivery Method): room air      CAPILLARY BLOOD GLUCOSE      POCT Blood Glucose.: 257 mg/dL (06 Feb 2025 02:14)  POCT Blood Glucose.: 187 mg/dL (05 Feb 2025 21:10)  POCT Blood Glucose.: 147 mg/dL (05 Feb 2025 19:25)  POCT Blood Glucose.: 90 mg/dL (05 Feb 2025 18:47)  POCT Blood Glucose.: 89 mg/dL (05 Feb 2025 18:25)  POCT Blood Glucose.: 174 mg/dL (05 Feb 2025 15:20)  POCT Blood Glucose.: 325 mg/dL (05 Feb 2025 13:37)  POCT Blood Glucose.: 428 mg/dL (05 Feb 2025 12:06)  POCT Blood Glucose.: 431 mg/dL (05 Feb 2025 12:05)  POCT Blood Glucose.: 384 mg/dL (05 Feb 2025 10:00)  POCT Blood Glucose.: 330 mg/dL (05 Feb 2025 08:37)    I&O's Summary    05 Feb 2025 07:01  -  06 Feb 2025 07:00  --------------------------------------------------------  IN: 1080 mL / OUT: 2475 mL / NET: -1395 mL        PHYSICAL EXAM:  GENERAL: NAD, lying in bed comfortably  HEAD:  Atraumatic, normocephalic  EYES: EOMI, PERRLA, conjunctiva clear, no conjunctival pallor, anicteric sclera  NECK: Supple, trachea midline, no JVD  HEART: Regular rate and rhythm, Normal S1 S2, no murmurs, rubs, or gallops  LUNGS: Unlabored respirations. Clear to ascultation bilaterally, no crackles, wheezing, or rhonchi  ABDOMEN: Soft, nondistended, nontender, no rebound or guarding, bowel sounds presents  EXTREMITIES: Warm extremities, no clubbing, cyanosis, or edema, peripheral pulses 2+ bilaterally  MUSCULOSKELETAL: No joint swelling or tenderness to palpation  NEURO: CN 2-12 grossly intact, moves all limbs spontaneously  SKIN: No rashes or lesions. Sterile dressing over sacral pressure wound.   PICC line in place C/D/I    LABS:                        9.1    7.88  )-----------( 256      ( 05 Feb 2025 07:41 )             29.2     02-05    141  |  104  |  37[H]  ----------------------------<  265[H]  4.4   |  26  |  0.77    Ca    9.8      05 Feb 2025 07:40  Phos  2.9     02-05  Mg     2.0     02-05    TPro  5.8[L]  /  Alb  2.8[L]  /  TBili  0.4  /  DBili  x   /  AST  23  /  ALT  25  /  AlkPhos  94  02-04          Urinalysis Basic - ( 05 Feb 2025 07:40 )    Color: x / Appearance: x / SG: x / pH: x  Gluc: 265 mg/dL / Ketone: x  / Bili: x / Urobili: x   Blood: x / Protein: x / Nitrite: x   Leuk Esterase: x / RBC: x / WBC x   Sq Epi: x / Non Sq Epi: x / Bacteria: x        RADIOLOGY & ADDITIONAL TESTS:    Imaging Personally Reviewed:    Consultant(s) Notes Reviewed:      Care Discussed with Consultants/Other Providers:    Nathalie Brunner MD, Internal Medicine Resident

## 2025-02-06 NOTE — PROGRESS NOTE ADULT - PROBLEM SELECTOR PROBLEM 3
Hypothyroidism
Hypothyroidism
Hypertension
Hypertension
Diabetes mellitus, type 2
Hypothyroidism
Hypothyroidism
Diabetes mellitus, type 2
Diabetes mellitus, type 2
Hypothyroidism
Hypothyroidism
Diabetes mellitus, type 2

## 2025-02-06 NOTE — PROGRESS NOTE ADULT - ATTENDING COMMENTS
68 y/o M w/ESRD s/p renal tx, retroperitoneal fibrosis, and ANGELIKA recently diagnosed w/DLBCL s/p mini R-CHOP x 2 now admitted for acute hypoxemic respiratory failure. CT chest w/diffuse bilateral GGOs of uncertain etiology. Possible viral PNA vs PCP (patient on long term steroids) less likely pulmonary edema or pneumonitis.     - Wean HFNC as tolerated goal O2 sat >= 90%  - Continue treatment for possible PCP  - Sputum cx, sputum PCP PCR  - Continue steroids  - Patient currently prefers to avoid bronchoscopy due to risk of difficulty weaning from ventilator, if patient worsens and requires intubation or if patient decides to accept risks of prolonged intubation would proceed with bronchoscopy
69-year-old man with ESRD s/p LRD from brother in 2012 excellent graft function  PMH of DM, HTN, HLD, Hypothyroidism,  sacral OM and E.coli bacteremia s/p meropenem 12/2024, diagnosed with diffuse large B cell lymphoma on liver biopsy 12/2024, Received R-CHOP on 12/28, was planned for cycle 2 but presented with fever, bilateral GGO, hypoxemia, and fungitell > 500 suspicious for PJP pneumonia. Started on bactrim and prednisone .     - Renal allograft function remain stable cr 0.8  - IS: was on tacrolimus and prednisone. Discontinue tacrolimus in the setting of PJP pneumonia   - Continue prednisone taper for PJP, keep on 10mg po daily   - Continue Bactrim for PJP treatment   - Valcyte for CMV prophylaxis
69M with PMHx of renal transplant 2012 (2/2 DM, s/p left nephrectomy), peripheral neuropathy, hypothyroidism, retroperitoneal fibrosis, HTN, HLD, GERD, anxiety/depression, ANGELIKA and recent admission at [facility] for sacral osteomyelitis and ESBL Coli urosepsis discharged on 12/18/24 to rehab, admitted to [facility] with hyperCa+ and liver masses s/p bx showing DLBCL, s/p RminiCHOP x 2, now presenting with fevers and shortness of breath, admitted for acute hypoxemic respiratory failure secondary to coronavirus, now with worsening hypoxemia and CT showing progression of GGO. Pulmonary consulted for bronchoscopic evaluation.    #Acute hypoxemic respiratory failure  #Abnormal CT chest with GGO  #Immunocompromised patient s/p chemo and renal transplant  #Chronic steroid use   - patient with worsening hypoxemic respiratory failure and CT concerning for worsening GGO (although significant motion artifact)  - ddx includes pulmonary edema vs PCP PNA vs viral PNA vs atypical PNA  - patient has been on steroids (>20mg/day) for >1 month without PCP ppx- PCP PNA can also present with bilateral GGO (although effusions less likely)  - on tx for PCP- c/w bactrim and prednisone 40mg BID for now  - Will f/u initial serologic w/u with CMV PCR, sputum PCP PCR, sputum culture and urine legionella, check HSV  - follow up fungitell and galactomannan  - check TTE  -Appreciate ID and Heme recs  -Continue to wean HFNC throughout the weekend with goal O2 saturation 90-95%  - discussed with patient and family bronchoscopic evaluation- as patient is on 100% FiO2, would likely not be able to come off the ventilator right after bronchoscopy- at this time, would prefer non invasive work up
70 y/o M w/ESRD s/p renal tx, retroperitoneal fibrosis, and ANGELIKA recently diagnosed w/DLBCL s/p mini R-CHOP x 2 now admitted for acute hypoxemic respiratory failure. CT chest w/diffuse bilateral GGOs of uncertain etiology. Possible viral PNA vs PCP (patient on long term steroids) less likely pulmonary edema or pneumonitis.     - Wean HFNC as tolerated goal O2 sat >= 90%  - Continue treatment for possible PCP  - Sputum cx, sputum PCP PCR  - Continue steroids  - Patient currently prefers to avoid bronchoscopy due to risk of difficulty weaning from ventilator, if patient worsens and requires intubation or if patient decides to accept risks of prolonged intubation would proceed with bronchoscopy
68 y/o M w/ESRD s/p renal tx, retroperitoneal fibrosis, and ANGELIKA recently diagnosed w/DLBCL s/p mini R-CHOP x 2 now admitted for acute hypoxemic respiratory failure. CT chest w/diffuse bilateral GGOs of uncertain etiology. Possible viral PNA vs PCP (patient on long term steroids) less likely pulmonary edema or pneumonitis.     - Wean O2 as tolerated goal O2 sat >= 90%  - Continue treatment for possible PCP  - Sputum cx, sputum PCP PCR  - Continue steroids  - Oxygenation improving with PCP treatment
69-year-old man with ESRD s/p LRD from brother in 2012 excellent graft function  PMH of DM, HTN, HLD, Hypothyroidism,  sacral OM and E.coli bacteremia s/p meropenem 12/2024, diagnosed with diffuse large B cell lymphoma on liver biopsy 12/2024, Received R-CHOP on 12/28, was planned for cycle 2 but presented with fever, bilateral GGO, hypoxemia, and fungitell > 500 suspicious for PJP pneumonia. Currently on bactrim and prednisone .   Clinically improving, off high flow oxygen and on nasal cannula.     - Renal allograft function remain stable cr 0.95mg/dL  - IS: was on tacrolimus and prednisone. Discontinued tacrolimus in the setting of PJP pneumonia   - Continue prednisone for PJP, once tapered down, will need to be n 10mg daily for kidney transplant.   - Continue Bactrim for PJP treatment   - Hyperkalemia due to bactrim - continue Lokelma 10gram po daily    - Valcyte for CMV prophylaxis .
69M with PMHx of renal transplant 2012 (2/2 DM, s/p left nephrectomy), peripheral neuropathy, hypothyroidism, retroperitoneal fibrosis, HTN, HLD, GERD, anxiety/depression, ANGELIKA and recent admission at [facility] for sacral osteomyelitis and ESBL Coli urosepsis discharged on 12/18/24 to rehab, admitted to [facility] with hyperCa+ and liver masses s/p bx showing DLBCL, s/p RminiCHOP x 2, now presenting with fevers and shortness of breath, admitted for acute hypoxemic respiratory failure secondary to coronavirus, now with worsening hypoxemia and CT showing progression of GGO. Pulmonary consulted for bronchoscopic evaluation.    #Acute hypoxemic respiratory failure  #Abnormal CT chest with GGO  #Immunocompromised patient s/p chemo and renal transplant  #Chronic steroid use   - patient with worsening hypoxemic respiratory failure and CT concerning for worsening GGO (although significant motion artifact)  - ddx includes pulmonary edema vs PCP PNA vs viral PNA vs atypical PNA  - patient has been on steroids (>20mg/day) for >1 month without PCP ppx- PCP PNA can also present with bilateral GGO (although effusions less likely)  - on tx for PCP- c/w bactrim and prednisone 40mg BID for now  - Will f/u initial serologic w/u with CMV PCR, sputum PCP PCR, sputum culture and urine legionella, check HSV  - follow up fungitell and galactomannan  - check TTE  -Appreciate ID and Heme recs  -Continue to wean HFNC throughout the weekend with goal O2 saturation 90-95%  - discussed with patient and family bronchoscopic evaluation- as patient is on high O2 requirements, would likely not be able to come off the ventilator right after bronchoscopy- at this time, would prefer non invasive work up
Piero Gann is a 69 year old male seen at bed rest; he has considerable fatigue and limited questions are offered to us by him; his wife inquires about application fo [facility] Medicaid. The patient is status post renal transplantation. He is not on dialysis; he developed a post transplantation lymphod tumor : B cell lymphoma and he has been treated by Dr DEVONTE ENRIQUE on 12/31/2025. He is now awaiting placement and we hope that he may be placed in [place]. He has physical deconditioning. If [facility] is able [facility] maintain outpatient status [facility] may be a candidate to tafasitumab.
69M w/pmh renal transplant 2012 (2/2 DM, s/p left nephrectomy), peripheral neuropathy, hypothyroidism, retroperitoneal fibrosis, HTN, HLD, GERD, anxiety/depression, ANGELIKA and recent admission at [facility] for sacral osteomyelitis and ESBL E. coli urosepsis, also recent diagnosis of DLBCL during that admission, presented to [facility] initially for chemo, however was found to have PJP pneumonia and treatment has been paused. ID, pulm, hematology, endocrinology, transplant nephrology recs appreciated. Continue to wean down O2 as tolerated, now down to 2L NC. Continue on bactrim and steroid taper.     Having hyperkalemia due to bactrim use, has been on aggressive bowel regimen and daily lokelma, but K was suddenly 6.7 today (last BM was 1.31 but he was very constipated prior to that). Will make sure he has another BM today (probably needs enema), transfer to telemetry bed, f/u repeat BMP after insulin/dextrose/lokelma. Will see w/ID if there's any other option for PJP pneumonia treatment.     Per hematology, no plans for inpatient chemo at this time. Now not for discharge to [facility] due to hyperkalemia.
69M w/pmh renal transplant 2012 (2/2 DM, s/p left nephrectomy), peripheral neuropathy, hypothyroidism, retroperitoneal fibrosis, HTN, HLD, GERD, anxiety/depression, ANGELIKA and recent admission at [facility] for sacral osteomyelitis and ESBL E. coli urosepsis, also recent diagnosis of DLBCL during that admission, presented to [facility] initially for chemo, however was found to have PJP pneumonia and treatment has been paused. ID, pulm, hematology, endocrinology, transplant nephrology recs appreciated. Continue to wean down O2 as tolerated, now down to 2L NC. Continue on bactrim and steroid taper. Will give lokelma for hyperkalemia, probably due to bactrim, increase bowel regimen - will give enema today.    Per hematology, no plans for inpatient chemo at this time. Stable for discharge to [facility],
69M w/pmh renal transplant 2012 (2/2 DM, s/p left nephrectomy), peripheral neuropathy, hypothyroidism, retroperitoneal fibrosis, HTN, HLD, GERD, anxiety/depression, ANGELIKA and recent admission at [facility] for sacral osteomyelitis and ESBL E. coli urosepsis, also recent diagnosis of DLBCL during that admission, presented to [facility] initially for chemo, however was found to have PJP pneumonia and treatment has been paused. ID, pulm, hematology, endocrinology, transplant nephrology recs appreciated. Continue to wean down O2 as tolerated, now down to 2L NC. Continue on bactrim and steroid taper.     Per hematology, no plans for inpatient chemo at this time.     DIMITRIOS/hyperkalemia now resolved upon switching to atovaquone and course of lokelma.    DC planning to [facility] Hannon MD  Division of Hospital Medicine  Contact via Microsoft Teams  Office: [phone]
-F/u endo for DC recs for insulins. Monitor and f/u overtime as steroids tapered.   -F/u renal txp recs.   -F/u ID recs.   -F/u pulm.   -Plan for DC to [facility] with outpt f/u.   -Plan discussed with house staff.   -34 minutes spent on dc process.
69M w/pmh renal transplant 2012 (2/2 DM, s/p left nephrectomy), peripheral neuropathy, hypothyroidism, retroperitoneal fibrosis, HTN, HLD, GERD, anxiety/depression, ANGELIKA and recent admission at [facility] for sacral osteomyelitis and ESBL E. coli urosepsis, also recent diagnosis of DLBCL during that admission, presented to [facility] initially for chemo, however was found to have PJP pneumonia and treatment has been paused. ID, pulm, hematology, endocrinology, transplant nephrology recs appreciated. Continue to wean down O2 as tolerated, now down to 2-3L NC. Continue on bactrim and steroid taper. Will give lokelma for hyperkalemia, probably due to bactrim, increased bowel regimen, had a large BM 1/31.     Per hematology, no plans for inpatient chemo at this time. Stable for discharge to [facility].
69M w/pmh renal transplant 2012 (2/2 DM, s/p left nephrectomy), peripheral neuropathy, hypothyroidism, retroperitoneal fibrosis, HTN, HLD, GERD, anxiety/depression, ANGELIKA and recent admission at [facility] for sacral osteomyelitis and ESBL.coli urosepsis, also recent diagnosis of DLBCL during that admission, presented to [facility] initially for chemo, however was found to have PJP pneumonia and treatment has been paused. ID, pulm, hematology, endocrinology, transplant nephrology recs appreciated. Continue to wean down O2 as tolerated, now down to 6L NC with SpO2 97% just now. Continue on bactrim and steroid taper. Will give lokelma for hyperkalemia, probably due to bactrim.
69M w/pmh renal transplant 2012 (2/2 DM, s/p left nephrectomy), peripheral neuropathy, hypothyroidism, retroperitoneal fibrosis, HTN, HLD, GERD, anxiety/depression, ANGELIKA and recent admission at [facility] for sacral osteomyelitis and ESBL E. coli urosepsis, also recent diagnosis of DLBCL during that admission, presented to [facility] initially for chemo, however was found to have PJP pneumonia and treatment has been paused. ID, pulm, hematology, endocrinology, transplant nephrology recs appreciated. Continue to wean down O2 as tolerated, now down to 2L NC. Continue on bactrim and steroid taper.     Per hematology, no plans for inpatient chemo at this time.     DIMITRIOS/hyperkalemia now resolved upon switching to atovaquone and course of lokelma.  Appreciate ID recs: to complete course of atovaquone + prednisone till 2/13  Plan to resume home prednisone 10mg as well as tacro upon completion of PJP treatment    Pt scheduled for discharge to [facility] today but cancelled 2/2 hyperglycemia to 480s  Will continue to monitor FS closely     dc planning in AM if FS remains stable    Eunice Hannon MD  Division of Hospital Medicine  Contact via Microsoft Teams  Office: [phone]
69M w/pmh renal transplant 2012 (2/2 DM, s/p left nephrectomy), peripheral neuropathy, hypothyroidism, retroperitoneal fibrosis, HTN, HLD, GERD, anxiety/depression, ANGELIKA and recent admission at [facility] for sacral osteomyelitis and ESBL E. coli urosepsis, also recent diagnosis of DLBCL during that admission, presented to [facility] initially for chemo, however was found to have PJP pneumonia and treatment has been paused. ID, pulm, hematology, endocrinology, transplant nephrology recs appreciated. Continue to wean down O2 as tolerated, now down to 3L NC. Continue on bactrim and steroid taper. Will give lokelma for hyperkalemia, probably due to bactrim, increase stool softeners.
69M w/pmh renal transplant 2012 (2/2 DM, s/p left nephrectomy), peripheral neuropathy, hypothyroidism, retroperitoneal fibrosis, HTN, HLD, GERD, anxiety/depression, ANGELIKA and recent admission at [facility] for sacral osteomyelitis and ESBL E. coli urosepsis, also recent diagnosis of DLBCL during that admission, presented to [facility] initially for chemo, however was found to have PJP pneumonia and treatment has been paused. ID, pulm, hematology, endocrinology, transplant nephrology recs appreciated. Continue to wean down O2 as tolerated, now down to 1L NC. Continue on bactrim and steroid taper. Will give lokelma for hyperkalemia, probably due to bactrim, increase bowel regimen.     Plan for discharge to [facility] if hematology does not have plans for inpatient chemo soon.
69M w/pmh renal transplant 2012 (2/2 DM, s/p left nephrectomy), peripheral neuropathy, hypothyroidism, retroperitoneal fibrosis, HTN, HLD, GERD, anxiety/depression, ANGELIKA and recent admission at [facility] for sacral osteomyelitis and ESBL E. coli urosepsis, also recent diagnosis of DLBCL during that admission, presented to [facility] initially for chemo, however was found to have PJP pneumonia and treatment has been paused. ID, pulm, hematology, endocrinology, transplant nephrology recs appreciated. Continue to wean down O2 as tolerated, now down to 2L NC. Continue on bactrim and steroid taper.     Per hematology, no plans for inpatient chemo at this time.     DIMITRIOS/hyperkalemia now resolved upon switching to atovaquone and course of lokelma.  Appreciate ID recs: to complete course of atovaquone + prednisone till 2/13  Will continue to monitor FS closely while and after prednisone course    stable for discharge  44 minutes spent    Eunice Hannon MD  Division of Hospital Medicine  Contact via [employer] Teams  Office: [phone]

## 2025-02-06 NOTE — PROGRESS NOTE ADULT - PROBLEM SELECTOR PLAN 2
1/17 Admitted + for Coronavirus, 1/24 - CT chest with c/f new PCP pneumonia, recs DC IV vanc, switch IV erta to IV mick given hypoalbuminemia efficacy concerns and start IV bactrim for empiric PCP PNA treatment as well as steroid taper pred 40mg BID x5 days followed by pred 40mg QD x5d then 20mg QD.   1/24 fungitell high > 500; pending galactomannan, beta D glucan, Initially on HFNC, weaned to 6L NC     Plan:  -Continue on PO atovaquone 750mg q12 until 2/13 to complete 21d course (including Bactrim). Start prophylactic dose of 1500mg Atovaquone ppx.   -wean O2 as tolerated and monitor on pulse ox, currently on 2L NC  -f/u sputum culture and sputum for PCP PCR if able to expectorate   -f/u aspergillus Ag, in process   -c/w Prednisone 40mg PO BID x5d until 1/28 then 40mg daily x5d (1/29-2/2), then 20mg daily for 11 days (2/3-2/13)  -Continue on valtrex ppx  - Monitor temps/CBC 1/17 Admitted + for Coronavirus, 1/24 - CT chest with c/f new PCP pneumonia, recs DC IV vanc, switch IV erta to IV mick given hypoalbuminemia efficacy concerns and start IV bactrim for empiric PCP PNA treatment as well as steroid taper pred 40mg BID x5 days followed by pred 40mg QD x5d then 20mg QD.   1/24 fungitell high > 500; pending galactomannan, beta D glucan, Initially on HFNC, weaned to 6L NC   Aspergillus neg    Plan:  -Continue on PO atovaquone 750mg q12 until 2/13 to complete 21d course (including Bactrim). Then continue on prophylactic dose of Bactrim DS on discharge, or if evidence of DIMITRIOS, per ID can switch ppx to 1500mg Atovaquone ppx.   -wean O2 as tolerated and monitor on pulse ox, currently on 2L NC  -f/u sputum culture and sputum for PCP PCR if able to expectorate   -c/w Prednisone 40mg PO BID x5d until 1/28 then 40mg daily x5d (1/29-2/2), then 20mg daily for 11 days (2/3-2/13), then 10 mg daily (2/14-  -Continue on valtrex ppx  - Monitor temps/CBC

## 2025-02-06 NOTE — CHART NOTE - NSCHARTNOTEFT_GEN_A_CORE
Age: 69y    Gender: Male    POCT Blood Glucose:  186 mg/dL (02-06-25 @ 12:03)  312 mg/dL (02-06-25 @ 09:52)  300 mg/dL (02-06-25 @ 09:18)  257 mg/dL (02-06-25 @ 02:14)  187 mg/dL (02-05-25 @ 21:10)  147 mg/dL (02-05-25 @ 19:25)  90 mg/dL (02-05-25 @ 18:47)  89 mg/dL (02-05-25 @ 18:25)      eMAR:  insulin glargine Injectable (LANTUS)   9 Unit(s) SubCutaneous (02-05-25 @ 21:54)    insulin lispro (ADMELOG) corrective regimen sliding scale   2 Unit(s) SubCutaneous (02-06-25 @ 02:31)    insulin lispro (ADMELOG) corrective regimen sliding scale   2 Unit(s) SubCutaneous (02-06-25 @ 12:49)   6 Unit(s) SubCutaneous (02-06-25 @ 09:20)    insulin lispro Injectable (ADMELOG)   20 Unit(s) SubCutaneous (02-06-25 @ 09:21)    insulin lispro Injectable (ADMELOG)   18 Unit(s) SubCutaneous (02-06-25 @ 12:50)    insulin lispro Injectable (ADMELOG)   14 Unit(s) SubCutaneous (02-05-25 @ 19:26)    levothyroxine   88 MICROGram(s) Oral (02-06-25 @ 05:41)    predniSONE   Tablet   20 milliGRAM(s) Oral (02-06-25 @ 05:41)    rosuvastatin   10 milliGRAM(s) Oral (02-05-25 @ 21:53)     POC glucose, insulin requirements, lab values reviewed.   Patient will be discharged to rehab today.      Problem/Plan - 1:  ·  Problem: Type 2 diabetes mellitus with hyperglycemia, with long-term current use of insulin.   ·  Plan: INPATIENT PLAN:  - Check BG TID AC, HS, and 2AM  - Adjust Lantus to 11units QHS  - Adjust Admelog to 24units with breakfast, continue 18u with lunch, and 14u with dinner (HOLD if NPO or steroid held)  - C/w moderate dose Admelog correctional scales TID AC, HS, and 2AM  - Please keep hypoglycemia protocol in place     DISCHARGE PLANNING:  - While on prednisone 20mg daily  - Continue Lantus 11 units QHS  - Continue Admelog 24 units with breakfast, 18units with lunch and 14 units with dinner, hold if NPO   - Doses may need to be adjusted while in rehab, especially when steroids are tapered.   - Continue to check BG before meals and at bedtime.   - Endocrine follow up: can f/u wit his Endocrinologist Dr. Romero.   - Recommend routine outpatient ophthalmology and podiatry follow up.     Problem/Plan - 2:  ·  Problem: Steroid-induced hyperglycemia.   ·  Plan: Patient on oral Prednisone taper now.     - S/p oral Pred 40mg BID x2 days (1/27-1/28)  - S/p oral Pred 40mg once daily x5 days (1/29-2/2)  - Now on oral Pred 20mg once daily x11 days (until 2/13),  - Prednisone 10mg starting on 2/14mg daily     Problem/Plan - 3:  ·  Problem: Hypothyroidism.   ·  Plan: Home regimen: Levothyroxine 88 mcg daily  12/31/24 TSH 0.55 FT4 1.2  Continue levothyroxine 88 mcg daily.     Problem/Plan - 4:  ·  Problem: Hypertension.   ·  Plan: - Goal BP <130/80  - on hydralazine, clonidine, coreg  - Management as per primary team  - check urine albumin level as outpatient.     Problem/Plan - 5:  ·  Problem: Hyperlipidemia.   ·  Plan: - LDL goal <70  - on rosuvastatin 10 mg daily  - check lipid panel as outpatient on a yearly basis.       Additional Information:  Additional Information: Contact via [employer] Teams during business hours  To reach covering provider access AL via [employer]  For Urgent matters/after-hours/weekends/holidays please page endocrine fellow on call   For nonurgent matters please email [email or web address]    Please note that this patient may be followed by different provider tomorrow.  Notify endocrine 24 hours prior to discharge for final recommendations

## 2025-02-06 NOTE — PROGRESS NOTE ADULT - ASSESSMENT
68 y/o M PMHx renal transplant 2012 (2/2 DM, s/p left nephrectomy), peripheral neuropathy, hypothyroidism, retroperitoneal fibrosis, HTN, HLD, GERD, anxiety/depression, ANGELIKA and recent admission at [facility] for sacral osteomyelitis and ESBL.coli urosepsis discharged on 12/18/24 to rehab. While admitted to [place] was noted to have hypercalcemia and liver masses which were biopsied on 12/16/24, biopsy revealed DLBCL. Patient received R mini CHOP on 12/28 now admitted for cycle #2 R-mini CHOP, upon admission patient is febrile and chemotherapy held, course c/b AHRF and c/f PJP pneumonia. ready for d/c pending [facility] placement at [facility].

## 2025-02-06 NOTE — PROGRESS NOTE ADULT - PROBLEM SELECTOR PROBLEM 1
Diabetes mellitus, type 2
Diabetes mellitus, type 2
DLBCL (diffuse large B cell lymphoma)
Type 2 diabetes mellitus with hyperglycemia, with long-term current use of insulin
DLBCL (diffuse large B cell lymphoma)
DLBCL (diffuse large B cell lymphoma)
Type 2 diabetes mellitus with hyperglycemia, with long-term current use of insulin
Type 2 diabetes mellitus with hyperglycemia, with long-term current use of insulin
DLBCL (diffuse large B cell lymphoma)
Type 2 diabetes mellitus with hyperglycemia, with long-term current use of insulin
DLBCL (diffuse large B cell lymphoma)

## 2025-02-06 NOTE — PROGRESS NOTE ADULT - PROBLEM SELECTOR PLAN 6
Continue Amlodipine, Coreg, Lisinopril, Clonidine, Hydralazine with holding parameters.

## 2025-02-06 NOTE — PROGRESS NOTE ADULT - PROBLEM SELECTOR PROBLEM 9
Major depression

## 2025-02-06 NOTE — PROGRESS NOTE ADULT - SUBJECTIVE AND OBJECTIVE BOX
[facility]  WANG Mccoy Y. Patel, S. Shah, G. Casimir  [phone]  ([phone] - weekdays after 5pm and weekends)    Name: JAN CALVIN  Age/Gender: 69y Male  MRN: [id number]    Interval History:  Patient seen and examined this morning.   Resting comfortably on room air.   Notes reviewed  No concerning overnight events  Afebrile   Allergies: No Known Allergies      Objective:  Vitals:   T(F): 97.6 (02-06-25 @ 09:45), Max: 98.9 (02-05-25 @ 17:07)  HR: 70 (02-06-25 @ 09:45) (70 - 80)  BP: 109/65 (02-06-25 @ 09:45) (109/65 - 153/64)  RR: 18 (02-06-25 @ 09:45) (18 - 18)  SpO2: 97% (02-06-25 @ 09:45) (97% - 99%)  Physical Examination:  General: no acute distress, RA  HEENT: normocephalic, atraumatic, anicteric  Respiratory: no acc muscle use, breathing comfortably  Cardiovascular: S1 and S2 present  Gastrointestinal: normal appearing, nondistended  Extremities: no edema, no cyanosis  Skin: no visible rash    Laboratory Studies:  CBC:                       9.0    7.08  )-----------( 237      ( 06 Feb 2025 07:34 )             29.0     WBC Trend:  7.08 02-06-25 @ 07:34  7.88 02-05-25 @ 07:41  8.60 02-04-25 @ 07:41  9.27 02-03-25 @ 07:14  9.08 02-02-25 @ 08:38  8.99 02-01-25 @ 06:55  13.05 01-31-25 @ 07:10    CMP: 02-06    140  |  104  |  38[H]  ----------------------------<  190[H]  3.9   |  27  |  0.81    Ca    9.8      06 Feb 2025 07:32  Phos  2.1     02-06  Mg     1.8     02-06      Creatinine: 0.81 mg/dL (02-06-25 @ 07:32)  Creatinine: 0.77 mg/dL (02-05-25 @ 07:40)  Creatinine: 0.94 mg/dL (02-04-25 @ 07:41)  Creatinine: 0.85 mg/dL (02-03-25 @ 07:15)  Creatinine: 0.92 mg/dL (02-02-25 @ 22:19)  Creatinine: 0.86 mg/dL (02-02-25 @ 17:55)  Creatinine: 0.75 mg/dL (02-02-25 @ 13:39)  Creatinine: 0.80 mg/dL (02-02-25 @ 08:38)  Creatinine: 0.84 mg/dL (02-01-25 @ 06:54)  Creatinine: 0.98 mg/dL (01-31-25 @ 07:11)    Microbiology: reviewed   Culture - Blood (collected 01-24-25 @ 00:46)  Source: .Blood BLOOD  Final Report (01-29-25 @ 04:00):    No growth at 5 days    Culture - Blood (collected 01-24-25 @ 00:18)  Source: .Blood BLOOD  Final Report (01-29-25 @ 04:00):    No growth at 5 days    Radiology: reviewed     Medications:  acetaminophen     Tablet .. 650 milliGRAM(s) Oral every 6 hours PRN  amLODIPine   Tablet 10 milliGRAM(s) Oral daily  artificial tears (preservative free) Ophthalmic Solution 1 Drop(s) Both EYES every 6 hours  atovaquone  Suspension 750 milliGRAM(s) Oral every 12 hours  bisacodyl Suppository 10 milliGRAM(s) Rectal once PRN  buPROPion XL (24-Hour) . 300 milliGRAM(s) Oral daily  carvedilol 12.5 milliGRAM(s) Oral every 12 hours  chlorhexidine 4% Liquid 1 Application(s) Topical <User Schedule>  cloNIDine 0.1 milliGRAM(s) Oral three times a day  dextrose 5%. 1000 milliLiter(s) IV Continuous <Continuous>  dextrose 5%. 1000 milliLiter(s) IV Continuous <Continuous>  dextrose 50% Injectable 25 Gram(s) IV Push once  dextrose Oral Gel 15 Gram(s) Oral once PRN  enoxaparin Injectable 40 milliGRAM(s) SubCutaneous <User Schedule>  escitalopram 10 milliGRAM(s) Oral daily  glucagon  Injectable 1 milliGRAM(s) IntraMuscular once  hydrALAZINE 100 milliGRAM(s) Oral every 8 hours  insulin glargine Injectable (LANTUS) 9 Unit(s) SubCutaneous at bedtime  insulin lispro (ADMELOG) corrective regimen sliding scale   SubCutaneous three times a day before meals  insulin lispro (ADMELOG) corrective regimen sliding scale   SubCutaneous <User Schedule>  insulin lispro Injectable (ADMELOG) 20 Unit(s) SubCutaneous before breakfast  insulin lispro Injectable (ADMELOG) 18 Unit(s) SubCutaneous before lunch  insulin lispro Injectable (ADMELOG) 14 Unit(s) SubCutaneous before dinner  lactulose Syrup 15 Gram(s) Oral every 8 hours  levothyroxine 88 MICROGram(s) Oral daily  pantoprazole    Tablet 40 milliGRAM(s) Oral before breakfast  polyethylene glycol 3350 17 Gram(s) Oral every 12 hours  predniSONE   Tablet 20 milliGRAM(s) Oral daily  rosuvastatin 10 milliGRAM(s) Oral at bedtime  senna 2 Tablet(s) Oral at bedtime  sodium chloride 0.9% lock flush 10 milliLiter(s) IV Push every 1 hour PRN  valACYclovir 500 milliGRAM(s) Oral every 12 hours    Current Antimicrobials:  atovaquone  Suspension 750 milliGRAM(s) Oral every 12 hours  valACYclovir 500 milliGRAM(s) Oral every 12 hours    Prior/Completed Antimicrobials:  ertapenem  IVPB  piperacillin/tazobactam IVPB.  piperacillin/tazobactam IVPB.-  vancomycin  IVPB

## 2025-02-06 NOTE — PROGRESS NOTE ADULT - PROBLEM SELECTOR PROBLEM 10
Chronic GERD

## 2025-02-06 NOTE — PROGRESS NOTE ADULT - PROBLEM SELECTOR PLAN 9
Continue Lexapro and Bupropion
Continue Lexapro and Bupropion
Continue Lexapro and Bupropion.
Continue Lexapro and Bupropion
Continue Lexapro and Bupropion
Continue Lexapro and Bupropion.
Continue Lexapro and Bupropion
Continue Lexapro and Bupropion.
Continue Lexapro and Bupropion
Continue Lexapro and Bupropion.
Continue Lexapro and Bupropion
Continue Lexapro and Bupropion.
Continue Lexapro and Bupropion.
Continue Lexapro and Bupropion
Continue Lexapro and Bupropion.

## 2025-02-06 NOTE — CHART NOTE - NSCHARTNOTESELECT_GEN_ALL_CORE
Endocrine followup/Event Note
Event Note
Event Note
Nutrition Services
Endocrine followup/Event Note
Endocrinology/Event Note
Event Note
Nutrition Services

## 2025-02-06 NOTE — PROGRESS NOTE ADULT - ASSESSMENT
Patient is a 69 year old male with PMH of renal transplant 2012 (2/2 DM, s/p left nephrectomy), peripheral neuropathy, hypothyroidism, retroperitoneal fibrosis, HTN, HLD, GERD, anxiety/depression, ANGELIKA and recent admission at [facility] for ESBL E.coli urosepsis, imaging with ?sacral OM though pt with no sacral wound suspected findings likely related to mets, he was discharged on 12/18/24 to rehab, recently diagnosed DLBCL while admitted to [place] when he was noted to have hypercalcemia and liver masses s/p biopsy 12/16/24, now s/p R mini CHOP on 12/28 who was admitted 1/17 for cycle #2 Rmini CHOP, upon admission patient febrile and chemotherapy was held for now.  Prior cultures reviewed, history of ESBL Klebsiella in Ucx 12/31/24, ESBL E.coli 11/29/24 Ucx     Viral URI d/t coronavirus (not COVID), treated for possible bacterial pneumonia  Persistent fevers, worsening hypoxia likely due to PJP   - 1/17 RVP with Coronavirus (229E,HKU1,NL63,OC43) detected, repeat 1/28 also positive -likely ongoing viral shedding   - 1/17 CT Chest with extensive new b/l ground glass nodular opacities, upper lobe dominant - possible pneumonia   - 1/17 CTAP decreased R hepatic mass since 12/9/24; known splenic mass; changes likely c/w retroperitoneal fibrosis   - s/p zosyn 1/17-1/20, escalated to ertapenem 1/20pm d/t fevers but fevers continued with worsening hypoxia   - 1/23 CTAP with no significant changes, diffuse erosive changes in sacrum with soft tissue infiltration similar to prior, low suspicion for OM given no open wound in area  - 1/23 CT Chest with diffuse b/l patchy ggo with central predominance increased from 1/17/25 - edema vs pneumonia; unchanged LLL round atelectasis; small L pleural effusion   -- c/f PCP based on above repeat CT, ongoing fevers, worsening hypoxia now requiring HFNC, LDH increased, and h/o of being on steroids without ppx and iso malignancy, noted vancomycin added overnight, s/p hydrocortisone 1/22-1/23   - IP eval appreciated, pt prefers to avoid bronch d/t risk of difficulty weaning from vent  - 1/17, 1/19, 1/21, 1/24 Bcx remain NGTD  - 1/24 Aspergillus ag negative   1/25 ua negative, EBV serology c/w past infection, CMV PCR negative    1/26 fungitell > 500 - c/w suspicion for PJP  1/29 afebrile >48h now, WBC stable, weaned off HFNC-now on NC, no cough  1/31 WBC trend noted-likely reactive, now on NC 2L, afebrile, overall much improved   2/1 WBC normalized, on NC 2L  2/2 noted with hyperkalemia likely d/t bactrim, changed to atovaquone   2/4 potassium normalized, Cr stable, afebrile, on NC     s/p zosyn 1/17-1/20  s/p ertapenem 1/20- 1/24  s/p vancomycin x1 on 1/24  s/p meropenem 1/24-1/26   s/p IV bactrim 1/24-1/31  s/p PO bactrim 1/31-2/2     Recommendations:   Continue Atovaquone 750mg PO Q12h to complete total 21d course (including bactrim) on 2/13  -then can trial on bactrim DS 1 tablet PO daily for ppx, if any electrolytes issues then will use atovaquone 1500mg daily  Continue Prednisone 20mg PO daily x11 days (2/3-2/13)  Nephrology following, monitor renal function/K   Continue on valtrex ppx  Monitor temps/CBC  Aspiration precautions  Wound care, offloading as able, nutrition  Continue rest of care per primary team       Risa Ac M.D.  [facility]  Available on [employer] TEAMS - *PREFERRED*  [phone]  After 5pm on weekdays and all day on weekends - please call [phone]     Thank you for consulting us and involving us in the management of this patients case. In addition to reviewing history, imaging, documents, labs, microbiology, took into account antibiotic stewardship, local antibiogram and infection control strategies and potential transmission issues.

## 2025-02-06 NOTE — PROGRESS NOTE ADULT - PROVIDER SPECIALTY LIST ADULT
Heme/Onc
Infectious Disease
Internal Medicine
Internal Medicine
Pulmonology
Transplant Nephrology
Heme/Onc
Heme/Onc
Infectious Disease
Pulmonology
Rehab Medicine
Transplant Nephrology
Heme/Onc
Infectious Disease
Pulmonology
Transplant Nephrology
Endocrinology
Infectious Disease
Heme/Onc
Endocrinology
Heme/Onc
Internal Medicine
Pulmonology
Endocrinology
Heme/Onc
Heme/Onc
Internal Medicine
Pulmonology
Endocrinology
Internal Medicine
Endocrinology
Internal Medicine
Endocrinology
Internal Medicine
Heme/Onc

## 2025-02-13 ENCOUNTER — APPOINTMENT (OUTPATIENT)
Dept: INFUSION THERAPY | Facility: HOSPITAL | Age: 70
End: 2025-02-13

## 2025-02-13 ENCOUNTER — RESULT REVIEW (OUTPATIENT)
Age: 70
End: 2025-02-13

## 2025-02-13 ENCOUNTER — APPOINTMENT (OUTPATIENT)
Dept: HEMATOLOGY ONCOLOGY | Facility: CLINIC | Age: 70
End: 2025-02-13
Payer: MEDICARE

## 2025-02-13 ENCOUNTER — NON-APPOINTMENT (OUTPATIENT)
Age: 70
End: 2025-02-13

## 2025-02-13 ENCOUNTER — APPOINTMENT (OUTPATIENT)
Dept: HEMATOLOGY ONCOLOGY | Facility: CLINIC | Age: 70
End: 2025-02-13

## 2025-02-13 VITALS
TEMPERATURE: 97.1 F | OXYGEN SATURATION: 99 % | HEART RATE: 74 BPM | DIASTOLIC BLOOD PRESSURE: 72 MMHG | HEIGHT: 72 IN | RESPIRATION RATE: 18 BRPM | SYSTOLIC BLOOD PRESSURE: 136 MMHG

## 2025-02-13 LAB
ALBUMIN SERPL ELPH-MCNC: 3.7 G/DL
ALP BLD-CCNC: 125 U/L
ALT SERPL-CCNC: 19 U/L
ANION GAP SERPL CALC-SCNC: 14 MMOL/L
AST SERPL-CCNC: 19 U/L
BASOPHILS # BLD AUTO: 0.04 K/UL — SIGNIFICANT CHANGE UP (ref 0–0.2)
BASOPHILS NFR BLD AUTO: 0.5 % — SIGNIFICANT CHANGE UP (ref 0–2)
BILIRUB SERPL-MCNC: 0.6 MG/DL
BUN SERPL-MCNC: 30 MG/DL
CALCIUM SERPL-MCNC: 10.5 MG/DL
CHLORIDE SERPL-SCNC: 96 MMOL/L
CO2 SERPL-SCNC: 26 MMOL/L
CREAT SERPL-MCNC: 0.66 MG/DL
EGFR: 103 ML/MIN/1.73M2
EOSINOPHIL # BLD AUTO: 0.17 K/UL — SIGNIFICANT CHANGE UP (ref 0–0.5)
EOSINOPHIL NFR BLD AUTO: 2.2 % — SIGNIFICANT CHANGE UP (ref 0–6)
GLUCOSE SERPL-MCNC: 371 MG/DL
HCT VFR BLD CALC: 34.2 % — LOW (ref 39–50)
HGB BLD-MCNC: 11.2 G/DL — LOW (ref 13–17)
IMM GRANULOCYTES NFR BLD AUTO: 0.8 % — SIGNIFICANT CHANGE UP (ref 0–0.9)
LDH SERPL-CCNC: 229 U/L
LYMPHOCYTES # BLD AUTO: 0.3 K/UL — LOW (ref 1–3.3)
LYMPHOCYTES # BLD AUTO: 3.8 % — LOW (ref 13–44)
MCHC RBC-ENTMCNC: 31.7 PG — SIGNIFICANT CHANGE UP (ref 27–34)
MCHC RBC-ENTMCNC: 32.7 G/DL — SIGNIFICANT CHANGE UP (ref 32–36)
MCV RBC AUTO: 96.9 FL — SIGNIFICANT CHANGE UP (ref 80–100)
MONOCYTES # BLD AUTO: 0.46 K/UL — SIGNIFICANT CHANGE UP (ref 0–0.9)
MONOCYTES NFR BLD AUTO: 5.9 % — SIGNIFICANT CHANGE UP (ref 2–14)
NEUTROPHILS # BLD AUTO: 6.77 K/UL — SIGNIFICANT CHANGE UP (ref 1.8–7.4)
NEUTROPHILS NFR BLD AUTO: 86.8 % — HIGH (ref 43–77)
NRBC BLD AUTO-RTO: 0 /100 WBCS — SIGNIFICANT CHANGE UP (ref 0–0)
PLATELET # BLD AUTO: 168 K/UL — SIGNIFICANT CHANGE UP (ref 150–400)
POTASSIUM SERPL-SCNC: 4.9 MMOL/L
PROT SERPL-MCNC: 7 G/DL
RBC # BLD: 3.53 M/UL — LOW (ref 4.2–5.8)
RBC # FLD: 19.7 % — HIGH (ref 10.3–14.5)
SODIUM SERPL-SCNC: 136 MMOL/L
URATE SERPL-MCNC: 5.7 MG/DL
WBC # BLD: 7.8 K/UL — SIGNIFICANT CHANGE UP (ref 3.8–10.5)
WBC # FLD AUTO: 7.8 K/UL — SIGNIFICANT CHANGE UP (ref 3.8–10.5)

## 2025-02-13 PROCEDURE — 99215 OFFICE O/P EST HI 40 MIN: CPT

## 2025-02-13 RX ORDER — VALACYCLOVIR 500 MG/1
500 TABLET, FILM COATED ORAL TWICE DAILY
Refills: 0 | Status: ACTIVE | COMMUNITY
Start: 2025-02-13

## 2025-02-13 RX ORDER — ROSUVASTATIN CALCIUM 10 MG/1
10 TABLET, FILM COATED ORAL
Refills: 0 | Status: ACTIVE | COMMUNITY
Start: 2025-02-13

## 2025-02-13 RX ORDER — ESCITALOPRAM OXALATE 10 MG/1
10 TABLET ORAL DAILY
Refills: 0 | Status: ACTIVE | COMMUNITY
Start: 2025-02-13

## 2025-02-13 RX ORDER — INSULIN LISPRO 100 U/ML
100 INJECTION, SOLUTION SUBCUTANEOUS
Refills: 0 | Status: ACTIVE | COMMUNITY
Start: 2025-02-13

## 2025-02-13 RX ORDER — CARVEDILOL 12.5 MG/1
12.5 TABLET, FILM COATED ORAL TWICE DAILY
Refills: 0 | Status: ACTIVE | COMMUNITY
Start: 2025-02-13

## 2025-02-13 RX ORDER — SULFAMETHOXAZOLE AND TRIMETHOPRIM 800; 160 MG/1; MG/1
800-160 TABLET ORAL
Refills: 0 | Status: ACTIVE | COMMUNITY
Start: 2025-02-13

## 2025-02-13 RX ORDER — PREDNISONE 10 MG/1
10 TABLET ORAL
Refills: 0 | Status: ACTIVE | COMMUNITY
Start: 2025-02-13

## 2025-02-13 RX ORDER — CLONIDINE HYDROCHLORIDE 0.1 MG/1
0.1 TABLET ORAL
Refills: 0 | Status: ACTIVE | COMMUNITY
Start: 2025-02-13

## 2025-02-13 RX ORDER — TACROLIMUS 1 MG/1
1 CAPSULE ORAL
Refills: 0 | Status: ACTIVE | COMMUNITY
Start: 2025-02-13

## 2025-02-13 RX ORDER — INSULIN GLARGINE 300 [IU]/ML
300 INJECTION, SOLUTION SUBCUTANEOUS DAILY
Refills: 0 | Status: ACTIVE | COMMUNITY
Start: 2025-02-13

## 2025-02-13 RX ORDER — POLYETHYLENE GLYCOL 3350 17 G/17G
17 POWDER, FOR SOLUTION ORAL DAILY
Refills: 0 | Status: ACTIVE | COMMUNITY
Start: 2025-02-13

## 2025-02-13 RX ORDER — PANTOPRAZOLE 40 MG/1
40 TABLET, DELAYED RELEASE ORAL DAILY
Refills: 2 | Status: ACTIVE | COMMUNITY
Start: 2025-02-13

## 2025-02-13 RX ORDER — LENALIDOMIDE 10 MG/1
10 CAPSULE ORAL
Qty: 14 | Refills: 0 | Status: ACTIVE | COMMUNITY
Start: 2025-02-13 | End: 1900-01-01

## 2025-02-14 ENCOUNTER — NON-APPOINTMENT (OUTPATIENT)
Age: 70
End: 2025-02-14

## 2025-02-14 DIAGNOSIS — Z51.11 ENCOUNTER FOR ANTINEOPLASTIC CHEMOTHERAPY: ICD-10-CM

## 2025-02-14 DIAGNOSIS — R11.2 NAUSEA WITH VOMITING, UNSPECIFIED: ICD-10-CM

## 2025-02-14 PROBLEM — C83.30 DLBCL (DIFFUSE LARGE B CELL LYMPHOMA): Status: ACTIVE | Noted: 2025-02-13

## 2025-02-18 RX ORDER — ATOVAQUONE 750 MG/5ML
750 SUSPENSION ORAL
Refills: 0 | Status: COMPLETED | COMMUNITY
Start: 2025-02-13 | End: 2025-02-13

## 2025-02-18 RX ORDER — PREDNISONE 20 MG/1
20 TABLET ORAL
Refills: 0 | Status: COMPLETED | COMMUNITY
Start: 2025-02-13 | End: 2025-02-13

## 2025-02-21 ENCOUNTER — NON-APPOINTMENT (OUTPATIENT)
Age: 70
End: 2025-02-21

## 2025-02-24 ENCOUNTER — NON-APPOINTMENT (OUTPATIENT)
Age: 70
End: 2025-02-24

## 2025-02-24 ENCOUNTER — APPOINTMENT (OUTPATIENT)
Dept: INFUSION THERAPY | Facility: HOSPITAL | Age: 70
End: 2025-02-24

## 2025-02-25 ENCOUNTER — APPOINTMENT (OUTPATIENT)
Dept: HEMATOLOGY ONCOLOGY | Facility: CLINIC | Age: 70
End: 2025-02-25
Payer: MEDICARE

## 2025-02-25 PROCEDURE — 99422 OL DIG E/M SVC 11-20 MIN: CPT

## 2025-02-26 ENCOUNTER — OUTPATIENT (OUTPATIENT)
Dept: OUTPATIENT SERVICES | Facility: HOSPITAL | Age: 70
LOS: 1 days | Discharge: HOME | End: 2025-02-26

## 2025-02-26 DIAGNOSIS — Z98.89 OTHER SPECIFIED POSTPROCEDURAL STATES: Chronic | ICD-10-CM

## 2025-02-26 DIAGNOSIS — Z90.5 ACQUIRED ABSENCE OF KIDNEY: Chronic | ICD-10-CM

## 2025-02-26 DIAGNOSIS — Z98.41 CATARACT EXTRACTION STATUS, RIGHT EYE: Chronic | ICD-10-CM

## 2025-02-26 DIAGNOSIS — N13.5 CROSSING VESSEL AND STRICTURE OF URETER WITHOUT HYDRONEPHROSIS: Chronic | ICD-10-CM

## 2025-02-26 DIAGNOSIS — Z90.49 ACQUIRED ABSENCE OF OTHER SPECIFIED PARTS OF DIGESTIVE TRACT: Chronic | ICD-10-CM

## 2025-02-26 DIAGNOSIS — Z98.42 CATARACT EXTRACTION STATUS, LEFT EYE: Chronic | ICD-10-CM

## 2025-02-26 DIAGNOSIS — Z94.0 KIDNEY TRANSPLANT STATUS: Chronic | ICD-10-CM

## 2025-02-26 DIAGNOSIS — I77.0 ARTERIOVENOUS FISTULA, ACQUIRED: Chronic | ICD-10-CM

## 2025-02-26 DIAGNOSIS — C83.30 DIFFUSE LARGE B-CELL LYMPHOMA, UNSPECIFIED SITE: ICD-10-CM

## 2025-02-27 ENCOUNTER — NON-APPOINTMENT (OUTPATIENT)
Age: 70
End: 2025-02-27

## 2025-02-27 ENCOUNTER — APPOINTMENT (OUTPATIENT)
Dept: INFUSION THERAPY | Facility: HOSPITAL | Age: 70
End: 2025-02-27

## 2025-02-27 ENCOUNTER — APPOINTMENT (OUTPATIENT)
Dept: HEMATOLOGY ONCOLOGY | Facility: CLINIC | Age: 70
End: 2025-02-27
Payer: MEDICARE

## 2025-02-27 ENCOUNTER — RESULT REVIEW (OUTPATIENT)
Age: 70
End: 2025-02-27

## 2025-02-27 ENCOUNTER — APPOINTMENT (OUTPATIENT)
Dept: HEMATOLOGY ONCOLOGY | Facility: CLINIC | Age: 70
End: 2025-02-27

## 2025-02-27 VITALS
HEART RATE: 77 BPM | OXYGEN SATURATION: 99 % | TEMPERATURE: 97.3 F | RESPIRATION RATE: 17 BRPM | SYSTOLIC BLOOD PRESSURE: 132 MMHG | DIASTOLIC BLOOD PRESSURE: 51 MMHG

## 2025-02-27 DIAGNOSIS — C83.30 DIFFUSE LARGE B-CELL LYMPHOMA, UNSPECIFIED SITE: ICD-10-CM

## 2025-02-27 LAB
ALBUMIN SERPL ELPH-MCNC: 3.5 G/DL — SIGNIFICANT CHANGE UP (ref 3.3–5)
ALP SERPL-CCNC: 108 U/L — SIGNIFICANT CHANGE UP (ref 40–120)
ALT FLD-CCNC: 34 U/L — SIGNIFICANT CHANGE UP (ref 10–45)
ANION GAP SERPL CALC-SCNC: 15 MMOL/L — SIGNIFICANT CHANGE UP (ref 5–17)
AST SERPL-CCNC: 29 U/L — SIGNIFICANT CHANGE UP (ref 10–40)
BASOPHILS # BLD AUTO: 0.04 K/UL — SIGNIFICANT CHANGE UP (ref 0–0.2)
BASOPHILS NFR BLD AUTO: 0.5 % — SIGNIFICANT CHANGE UP (ref 0–2)
BILIRUB SERPL-MCNC: 0.3 MG/DL — SIGNIFICANT CHANGE UP (ref 0.2–1.2)
BUN SERPL-MCNC: 20 MG/DL — SIGNIFICANT CHANGE UP (ref 7–23)
CALCIUM SERPL-MCNC: 9.4 MG/DL — SIGNIFICANT CHANGE UP (ref 8.4–10.5)
CHLORIDE SERPL-SCNC: 97 MMOL/L — SIGNIFICANT CHANGE UP (ref 96–108)
CO2 SERPL-SCNC: 23 MMOL/L — SIGNIFICANT CHANGE UP (ref 22–31)
CREAT SERPL-MCNC: 0.68 MG/DL — SIGNIFICANT CHANGE UP (ref 0.5–1.3)
EGFR: 102 ML/MIN/1.73M2 — SIGNIFICANT CHANGE UP
EGFR: 102 ML/MIN/1.73M2 — SIGNIFICANT CHANGE UP
EOSINOPHIL # BLD AUTO: 0.15 K/UL — SIGNIFICANT CHANGE UP (ref 0–0.5)
EOSINOPHIL NFR BLD AUTO: 1.8 % — SIGNIFICANT CHANGE UP (ref 0–6)
GLUCOSE SERPL-MCNC: 404 MG/DL — HIGH (ref 70–99)
HCT VFR BLD CALC: 32.9 % — LOW (ref 39–50)
HGB BLD-MCNC: 11 G/DL — LOW (ref 13–17)
IMM GRANULOCYTES NFR BLD AUTO: 2 % — HIGH (ref 0–0.9)
LDH SERPL L TO P-CCNC: 201 U/L — SIGNIFICANT CHANGE UP (ref 50–242)
LYMPHOCYTES # BLD AUTO: 0.62 K/UL — LOW (ref 1–3.3)
LYMPHOCYTES # BLD AUTO: 7.6 % — LOW (ref 13–44)
MCHC RBC-ENTMCNC: 31.9 PG — SIGNIFICANT CHANGE UP (ref 27–34)
MCHC RBC-ENTMCNC: 33.4 G/DL — SIGNIFICANT CHANGE UP (ref 32–36)
MCV RBC AUTO: 95.4 FL — SIGNIFICANT CHANGE UP (ref 80–100)
MONOCYTES # BLD AUTO: 0.86 K/UL — SIGNIFICANT CHANGE UP (ref 0–0.9)
MONOCYTES NFR BLD AUTO: 10.6 % — SIGNIFICANT CHANGE UP (ref 2–14)
NEUTROPHILS # BLD AUTO: 6.29 K/UL — SIGNIFICANT CHANGE UP (ref 1.8–7.4)
NEUTROPHILS NFR BLD AUTO: 77.5 % — HIGH (ref 43–77)
NRBC BLD AUTO-RTO: 0 /100 WBCS — SIGNIFICANT CHANGE UP (ref 0–0)
PLATELET # BLD AUTO: 270 K/UL — SIGNIFICANT CHANGE UP (ref 150–400)
POTASSIUM SERPL-MCNC: 4.5 MMOL/L — SIGNIFICANT CHANGE UP (ref 3.5–5.3)
POTASSIUM SERPL-SCNC: 4.5 MMOL/L — SIGNIFICANT CHANGE UP (ref 3.5–5.3)
PROT SERPL-MCNC: 6.6 G/DL — SIGNIFICANT CHANGE UP (ref 6–8.3)
RBC # BLD: 3.45 M/UL — LOW (ref 4.2–5.8)
RBC # FLD: 17.4 % — HIGH (ref 10.3–14.5)
SODIUM SERPL-SCNC: 134 MMOL/L — LOW (ref 135–145)
WBC # BLD: 8.12 K/UL — SIGNIFICANT CHANGE UP (ref 3.8–10.5)
WBC # FLD AUTO: 8.12 K/UL — SIGNIFICANT CHANGE UP (ref 3.8–10.5)

## 2025-02-27 PROCEDURE — 99215 OFFICE O/P EST HI 40 MIN: CPT

## 2025-02-27 PROCEDURE — G2211 COMPLEX E/M VISIT ADD ON: CPT

## 2025-02-28 DIAGNOSIS — Z51.11 ENCOUNTER FOR ANTINEOPLASTIC CHEMOTHERAPY: ICD-10-CM

## 2025-02-28 DIAGNOSIS — R11.2 NAUSEA WITH VOMITING, UNSPECIFIED: ICD-10-CM

## 2025-03-06 ENCOUNTER — APPOINTMENT (OUTPATIENT)
Dept: HEMATOLOGY ONCOLOGY | Facility: CLINIC | Age: 70
End: 2025-03-06

## 2025-03-06 ENCOUNTER — APPOINTMENT (OUTPATIENT)
Dept: INFUSION THERAPY | Facility: HOSPITAL | Age: 70
End: 2025-03-06

## 2025-03-07 ENCOUNTER — NON-APPOINTMENT (OUTPATIENT)
Age: 70
End: 2025-03-07

## 2025-03-11 ENCOUNTER — APPOINTMENT (OUTPATIENT)
Dept: HEMATOLOGY ONCOLOGY | Facility: CLINIC | Age: 70
End: 2025-03-11
Payer: MEDICARE

## 2025-03-11 DIAGNOSIS — C83.30 DIFFUSE LARGE B-CELL LYMPHOMA, UNSPECIFIED SITE: ICD-10-CM

## 2025-03-11 PROCEDURE — 99212 OFFICE O/P EST SF 10 MIN: CPT | Mod: 2W

## 2025-03-13 ENCOUNTER — INPATIENT (INPATIENT)
Facility: HOSPITAL | Age: 70
LOS: 7 days | Discharge: NURSING, REHAB OR LONG-TERM CARE | DRG: 871 | End: 2025-03-21
Attending: INTERNAL MEDICINE | Admitting: STUDENT IN AN ORGANIZED HEALTH CARE EDUCATION/TRAINING PROGRAM
Payer: MEDICARE

## 2025-03-13 VITALS
RESPIRATION RATE: 18 BRPM | DIASTOLIC BLOOD PRESSURE: 75 MMHG | WEIGHT: 179.9 LBS | OXYGEN SATURATION: 98 % | SYSTOLIC BLOOD PRESSURE: 147 MMHG | TEMPERATURE: 97 F | HEART RATE: 79 BPM | HEIGHT: 72 IN

## 2025-03-13 DIAGNOSIS — E03.9 HYPOTHYROIDISM, UNSPECIFIED: ICD-10-CM

## 2025-03-13 DIAGNOSIS — I10 ESSENTIAL (PRIMARY) HYPERTENSION: ICD-10-CM

## 2025-03-13 DIAGNOSIS — A41.9 SEPSIS, UNSPECIFIED ORGANISM: ICD-10-CM

## 2025-03-13 DIAGNOSIS — Z98.89 OTHER SPECIFIED POSTPROCEDURAL STATES: Chronic | ICD-10-CM

## 2025-03-13 DIAGNOSIS — R29.6 REPEATED FALLS: ICD-10-CM

## 2025-03-13 DIAGNOSIS — F32.9 MAJOR DEPRESSIVE DISORDER, SINGLE EPISODE, UNSPECIFIED: ICD-10-CM

## 2025-03-13 DIAGNOSIS — E11.9 TYPE 2 DIABETES MELLITUS WITHOUT COMPLICATIONS: ICD-10-CM

## 2025-03-13 DIAGNOSIS — Z94.0 KIDNEY TRANSPLANT STATUS: Chronic | ICD-10-CM

## 2025-03-13 DIAGNOSIS — I25.10 ATHEROSCLEROTIC HEART DISEASE OF NATIVE CORONARY ARTERY WITHOUT ANGINA PECTORIS: ICD-10-CM

## 2025-03-13 DIAGNOSIS — Z29.9 ENCOUNTER FOR PROPHYLACTIC MEASURES, UNSPECIFIED: ICD-10-CM

## 2025-03-13 DIAGNOSIS — I77.0 ARTERIOVENOUS FISTULA, ACQUIRED: Chronic | ICD-10-CM

## 2025-03-13 DIAGNOSIS — Z98.41 CATARACT EXTRACTION STATUS, RIGHT EYE: Chronic | ICD-10-CM

## 2025-03-13 DIAGNOSIS — Z94.0 KIDNEY TRANSPLANT STATUS: ICD-10-CM

## 2025-03-13 DIAGNOSIS — C83.30 DIFFUSE LARGE B-CELL LYMPHOMA, UNSPECIFIED SITE: ICD-10-CM

## 2025-03-13 DIAGNOSIS — N13.5 CROSSING VESSEL AND STRICTURE OF URETER WITHOUT HYDRONEPHROSIS: Chronic | ICD-10-CM

## 2025-03-13 DIAGNOSIS — G92.8 OTHER TOXIC ENCEPHALOPATHY: ICD-10-CM

## 2025-03-13 DIAGNOSIS — Z90.49 ACQUIRED ABSENCE OF OTHER SPECIFIED PARTS OF DIGESTIVE TRACT: Chronic | ICD-10-CM

## 2025-03-13 DIAGNOSIS — Z90.5 ACQUIRED ABSENCE OF KIDNEY: Chronic | ICD-10-CM

## 2025-03-13 DIAGNOSIS — Z98.42 CATARACT EXTRACTION STATUS, LEFT EYE: Chronic | ICD-10-CM

## 2025-03-13 DIAGNOSIS — K52.9 NONINFECTIVE GASTROENTERITIS AND COLITIS, UNSPECIFIED: ICD-10-CM

## 2025-03-13 LAB
ADD ON TEST-SPECIMEN IN LAB: SIGNIFICANT CHANGE UP
ADD ON TEST-SPECIMEN IN LAB: SIGNIFICANT CHANGE UP
ALBUMIN SERPL ELPH-MCNC: 3.3 G/DL — SIGNIFICANT CHANGE UP (ref 3.3–5)
ALP SERPL-CCNC: 112 U/L — SIGNIFICANT CHANGE UP (ref 40–120)
ALT FLD-CCNC: 22 U/L — SIGNIFICANT CHANGE UP (ref 10–45)
ANION GAP SERPL CALC-SCNC: 14 MMOL/L — SIGNIFICANT CHANGE UP (ref 5–17)
ANISOCYTOSIS BLD QL: SLIGHT — SIGNIFICANT CHANGE UP
APPEARANCE UR: ABNORMAL
AST SERPL-CCNC: 25 U/L — SIGNIFICANT CHANGE UP (ref 10–40)
BASOPHILS # BLD AUTO: 0.09 K/UL — SIGNIFICANT CHANGE UP (ref 0–0.2)
BASOPHILS NFR BLD AUTO: 0.9 % — SIGNIFICANT CHANGE UP (ref 0–2)
BILIRUB SERPL-MCNC: 0.6 MG/DL — SIGNIFICANT CHANGE UP (ref 0.2–1.2)
BILIRUB UR-MCNC: ABNORMAL
BUN SERPL-MCNC: 20 MG/DL — SIGNIFICANT CHANGE UP (ref 7–23)
CALCIUM SERPL-MCNC: 9.3 MG/DL — SIGNIFICANT CHANGE UP (ref 8.4–10.5)
CHLORIDE SERPL-SCNC: 103 MMOL/L — SIGNIFICANT CHANGE UP (ref 96–108)
CK SERPL-CCNC: 46 U/L — SIGNIFICANT CHANGE UP (ref 30–200)
CO2 SERPL-SCNC: 19 MMOL/L — LOW (ref 22–31)
COLOR SPEC: SIGNIFICANT CHANGE UP
CREAT SERPL-MCNC: 0.85 MG/DL — SIGNIFICANT CHANGE UP (ref 0.5–1.3)
DACRYOCYTES BLD QL SMEAR: SLIGHT — SIGNIFICANT CHANGE UP
DIFF PNL FLD: NEGATIVE — SIGNIFICANT CHANGE UP
EGFR: 95 ML/MIN/1.73M2 — SIGNIFICANT CHANGE UP
EGFR: 95 ML/MIN/1.73M2 — SIGNIFICANT CHANGE UP
EOSINOPHIL NFR BLD AUTO: 0 % — SIGNIFICANT CHANGE UP (ref 0–6)
FLUAV AG NPH QL: SIGNIFICANT CHANGE UP
FLUBV AG NPH QL: SIGNIFICANT CHANGE UP
GAS PNL BLDV: SIGNIFICANT CHANGE UP
GLUCOSE BLDC GLUCOMTR-MCNC: 220 MG/DL — HIGH (ref 70–99)
GLUCOSE SERPL-MCNC: 133 MG/DL — HIGH (ref 70–99)
GLUCOSE UR QL: 500 MG/DL
HCT VFR BLD CALC: 37.3 % — LOW (ref 39–50)
HGB BLD-MCNC: 11.9 G/DL — LOW (ref 13–17)
KETONES UR-MCNC: NEGATIVE MG/DL — SIGNIFICANT CHANGE UP
LEUKOCYTE ESTERASE UR-ACNC: ABNORMAL
LIDOCAIN IGE QN: 8 U/L — SIGNIFICANT CHANGE UP (ref 7–60)
LYMPHOCYTES # BLD AUTO: 0 K/UL — LOW (ref 1–3.3)
MACROCYTES BLD QL: SLIGHT — SIGNIFICANT CHANGE UP
MAGNESIUM SERPL-MCNC: 2.4 MG/DL — SIGNIFICANT CHANGE UP (ref 1.6–2.6)
MANUAL SMEAR VERIFICATION: SIGNIFICANT CHANGE UP
MCHC RBC-ENTMCNC: 30.4 PG — SIGNIFICANT CHANGE UP (ref 27–34)
MCHC RBC-ENTMCNC: 31.9 G/DL — LOW (ref 32–36)
MCV RBC AUTO: 95.4 FL — SIGNIFICANT CHANGE UP (ref 80–100)
MONOCYTES # BLD AUTO: 0.92 K/UL — HIGH (ref 0–0.9)
MONOCYTES NFR BLD AUTO: 8.7 % — SIGNIFICANT CHANGE UP (ref 2–14)
NEUTROPHILS # BLD AUTO: 9.54 K/UL — HIGH (ref 1.8–7.4)
NEUTROPHILS NFR BLD AUTO: 90.4 % — HIGH (ref 43–77)
NITRITE UR-MCNC: NEGATIVE — SIGNIFICANT CHANGE UP
OVALOCYTES BLD QL SMEAR: SLIGHT — SIGNIFICANT CHANGE UP
PH UR: 6 — SIGNIFICANT CHANGE UP (ref 5–8)
PLAT MORPH BLD: NORMAL — SIGNIFICANT CHANGE UP
PLATELET # BLD AUTO: 443 K/UL — HIGH (ref 150–400)
POIKILOCYTOSIS BLD QL AUTO: SLIGHT — SIGNIFICANT CHANGE UP
POLYCHROMASIA BLD QL SMEAR: SLIGHT — SIGNIFICANT CHANGE UP
POTASSIUM SERPL-MCNC: 5.1 MMOL/L — SIGNIFICANT CHANGE UP (ref 3.5–5.3)
POTASSIUM SERPL-SCNC: 5.1 MMOL/L — SIGNIFICANT CHANGE UP (ref 3.5–5.3)
PROT SERPL-MCNC: 6.9 G/DL — SIGNIFICANT CHANGE UP (ref 6–8.3)
PROT UR-MCNC: 30 MG/DL
RBC # BLD: 3.91 M/UL — LOW (ref 4.2–5.8)
RBC # FLD: 16.4 % — HIGH (ref 10.3–14.5)
RBC BLD AUTO: ABNORMAL
RBC CASTS # UR COMP ASSIST: SIGNIFICANT CHANGE UP /HPF
SARS-COV-2 RNA SPEC QL NAA+PROBE: SIGNIFICANT CHANGE UP
SODIUM SERPL-SCNC: 136 MMOL/L — SIGNIFICANT CHANGE UP (ref 135–145)
SP GR SPEC: 1.02 — SIGNIFICANT CHANGE UP (ref 1–1.03)
UROBILINOGEN FLD QL: 1 MG/DL — SIGNIFICANT CHANGE UP (ref 0.2–1)
WBC # BLD: 10.55 K/UL — HIGH (ref 3.8–10.5)
WBC # FLD AUTO: 10.55 K/UL — HIGH (ref 3.8–10.5)
WBC UR QL: SIGNIFICANT CHANGE UP /HPF (ref 0–5)

## 2025-03-13 PROCEDURE — 72125 CT NECK SPINE W/O DYE: CPT | Mod: 26

## 2025-03-13 PROCEDURE — 99285 EMERGENCY DEPT VISIT HI MDM: CPT | Mod: GC

## 2025-03-13 PROCEDURE — 74177 CT ABD & PELVIS W/CONTRAST: CPT | Mod: 26

## 2025-03-13 PROCEDURE — 71045 X-RAY EXAM CHEST 1 VIEW: CPT | Mod: 26

## 2025-03-13 PROCEDURE — 99223 1ST HOSP IP/OBS HIGH 75: CPT | Mod: GC

## 2025-03-13 PROCEDURE — 70450 CT HEAD/BRAIN W/O DYE: CPT | Mod: 26

## 2025-03-13 RX ORDER — SODIUM CHLORIDE 9 G/1000ML
1000 INJECTION, SOLUTION INTRAVENOUS
Refills: 0 | Status: DISCONTINUED | OUTPATIENT
Start: 2025-03-13 | End: 2025-03-21

## 2025-03-13 RX ORDER — DEXTROSE 50 % IN WATER 50 %
12.5 SYRINGE (ML) INTRAVENOUS ONCE
Refills: 0 | Status: DISCONTINUED | OUTPATIENT
Start: 2025-03-13 | End: 2025-03-21

## 2025-03-13 RX ORDER — DEXTROSE 50 % IN WATER 50 %
25 SYRINGE (ML) INTRAVENOUS ONCE
Refills: 0 | Status: DISCONTINUED | OUTPATIENT
Start: 2025-03-13 | End: 2025-03-21

## 2025-03-13 RX ORDER — ERTAPENEM SODIUM 1 G/1
1000 INJECTION, POWDER, LYOPHILIZED, FOR SOLUTION INTRAMUSCULAR; INTRAVENOUS EVERY 24 HOURS
Refills: 0 | Status: DISCONTINUED | OUTPATIENT
Start: 2025-03-13 | End: 2025-03-18

## 2025-03-13 RX ORDER — DEXTROSE 50 % IN WATER 50 %
15 SYRINGE (ML) INTRAVENOUS ONCE
Refills: 0 | Status: DISCONTINUED | OUTPATIENT
Start: 2025-03-13 | End: 2025-03-21

## 2025-03-13 RX ORDER — VANCOMYCIN HCL IN 5 % DEXTROSE 1.5G/250ML
1000 PLASTIC BAG, INJECTION (ML) INTRAVENOUS ONCE
Refills: 0 | Status: COMPLETED | OUTPATIENT
Start: 2025-03-13 | End: 2025-03-13

## 2025-03-13 RX ORDER — ASPIRIN 325 MG
1 TABLET ORAL
Refills: 0 | DISCHARGE

## 2025-03-13 RX ORDER — INSULIN LISPRO 100 U/ML
INJECTION, SOLUTION INTRAVENOUS; SUBCUTANEOUS
Refills: 0 | Status: DISCONTINUED | OUTPATIENT
Start: 2025-03-13 | End: 2025-03-16

## 2025-03-13 RX ORDER — ACETAMINOPHEN 500 MG/5ML
1000 LIQUID (ML) ORAL ONCE
Refills: 0 | Status: COMPLETED | OUTPATIENT
Start: 2025-03-13 | End: 2025-03-13

## 2025-03-13 RX ORDER — SULFAMETHOXAZOLE/TRIMETHOPRIM 800-160 MG
1 TABLET ORAL DAILY
Refills: 0 | Status: DISCONTINUED | OUTPATIENT
Start: 2025-03-13 | End: 2025-03-21

## 2025-03-13 RX ORDER — ACETAMINOPHEN 500 MG/5ML
650 LIQUID (ML) ORAL EVERY 6 HOURS
Refills: 0 | Status: DISCONTINUED | OUTPATIENT
Start: 2025-03-13 | End: 2025-03-14

## 2025-03-13 RX ORDER — ROSUVASTATIN CALCIUM 20 MG/1
10 TABLET, FILM COATED ORAL AT BEDTIME
Refills: 0 | Status: DISCONTINUED | OUTPATIENT
Start: 2025-03-13 | End: 2025-03-21

## 2025-03-13 RX ORDER — BUPROPION HYDROBROMIDE 522 MG/1
300 TABLET, EXTENDED RELEASE ORAL DAILY
Refills: 0 | Status: DISCONTINUED | OUTPATIENT
Start: 2025-03-13 | End: 2025-03-21

## 2025-03-13 RX ORDER — LEVOTHYROXINE SODIUM 300 MCG
88 TABLET ORAL DAILY
Refills: 0 | Status: DISCONTINUED | OUTPATIENT
Start: 2025-03-13 | End: 2025-03-21

## 2025-03-13 RX ORDER — INSULIN GLARGINE-YFGN 100 [IU]/ML
10 INJECTION, SOLUTION SUBCUTANEOUS AT BEDTIME
Refills: 0 | Status: DISCONTINUED | OUTPATIENT
Start: 2025-03-13 | End: 2025-03-15

## 2025-03-13 RX ORDER — HEPARIN SODIUM 1000 [USP'U]/ML
5000 INJECTION INTRAVENOUS; SUBCUTANEOUS EVERY 8 HOURS
Refills: 0 | Status: DISCONTINUED | OUTPATIENT
Start: 2025-03-13 | End: 2025-03-18

## 2025-03-13 RX ORDER — INSULIN LISPRO 100 U/ML
INJECTION, SOLUTION INTRAVENOUS; SUBCUTANEOUS AT BEDTIME
Refills: 0 | Status: DISCONTINUED | OUTPATIENT
Start: 2025-03-13 | End: 2025-03-16

## 2025-03-13 RX ORDER — ASPIRIN 325 MG
81 TABLET ORAL DAILY
Refills: 0 | Status: DISCONTINUED | OUTPATIENT
Start: 2025-03-13 | End: 2025-03-21

## 2025-03-13 RX ORDER — INSULIN GLARGINE-YFGN 100 [IU]/ML
5 INJECTION, SOLUTION SUBCUTANEOUS EVERY MORNING
Refills: 0 | Status: DISCONTINUED | OUTPATIENT
Start: 2025-03-13 | End: 2025-03-18

## 2025-03-13 RX ORDER — GLUCAGON 3 MG/1
1 POWDER NASAL ONCE
Refills: 0 | Status: DISCONTINUED | OUTPATIENT
Start: 2025-03-13 | End: 2025-03-21

## 2025-03-13 RX ORDER — MAGNESIUM, ALUMINUM HYDROXIDE 200-200 MG
30 TABLET,CHEWABLE ORAL EVERY 4 HOURS
Refills: 0 | Status: DISCONTINUED | OUTPATIENT
Start: 2025-03-13 | End: 2025-03-21

## 2025-03-13 RX ORDER — SODIUM CHLORIDE 9 G/1000ML
1000 INJECTION, SOLUTION INTRAVENOUS ONCE
Refills: 0 | Status: COMPLETED | OUTPATIENT
Start: 2025-03-13 | End: 2025-03-13

## 2025-03-13 RX ORDER — MELATONIN 5 MG
3 TABLET ORAL AT BEDTIME
Refills: 0 | Status: DISCONTINUED | OUTPATIENT
Start: 2025-03-13 | End: 2025-03-16

## 2025-03-13 RX ORDER — TACROLIMUS 0.5 MG/1
1 CAPSULE ORAL
Refills: 0 | Status: DISCONTINUED | OUTPATIENT
Start: 2025-03-13 | End: 2025-03-18

## 2025-03-13 RX ORDER — PIPERACILLIN-TAZO-DEXTROSE,ISO 3.375G/5
3.38 IV SOLUTION, PIGGYBACK PREMIX FROZEN(ML) INTRAVENOUS ONCE
Refills: 0 | Status: COMPLETED | OUTPATIENT
Start: 2025-03-13 | End: 2025-03-13

## 2025-03-13 RX ORDER — ONDANSETRON HCL/PF 4 MG/2 ML
4 VIAL (ML) INJECTION EVERY 8 HOURS
Refills: 0 | Status: DISCONTINUED | OUTPATIENT
Start: 2025-03-13 | End: 2025-03-21

## 2025-03-13 RX ORDER — ESCITALOPRAM OXALATE 20 MG/1
10 TABLET ORAL DAILY
Refills: 0 | Status: DISCONTINUED | OUTPATIENT
Start: 2025-03-13 | End: 2025-03-21

## 2025-03-13 RX ORDER — ONDANSETRON HCL/PF 4 MG/2 ML
4 VIAL (ML) INJECTION ONCE
Refills: 0 | Status: COMPLETED | OUTPATIENT
Start: 2025-03-13 | End: 2025-03-13

## 2025-03-13 RX ADMIN — Medication 200 GRAM(S): at 18:02

## 2025-03-13 RX ADMIN — Medication 3.38 GRAM(S): at 18:02

## 2025-03-13 RX ADMIN — Medication 250 MILLIGRAM(S): at 17:50

## 2025-03-13 RX ADMIN — ERTAPENEM SODIUM 100 MILLIGRAM(S): 1 INJECTION, POWDER, LYOPHILIZED, FOR SOLUTION INTRAMUSCULAR; INTRAVENOUS at 23:13

## 2025-03-13 RX ADMIN — Medication 1000 MILLIGRAM(S): at 18:02

## 2025-03-13 RX ADMIN — Medication 4 MILLIGRAM(S): at 16:00

## 2025-03-13 RX ADMIN — Medication 400 MILLIGRAM(S): at 16:00

## 2025-03-13 RX ADMIN — ROSUVASTATIN CALCIUM 10 MILLIGRAM(S): 20 TABLET, FILM COATED ORAL at 23:13

## 2025-03-13 RX ADMIN — SODIUM CHLORIDE 1000 MILLILITER(S): 9 INJECTION, SOLUTION INTRAVENOUS at 17:50

## 2025-03-13 RX ADMIN — HEPARIN SODIUM 5000 UNIT(S): 1000 INJECTION INTRAVENOUS; SUBCUTANEOUS at 23:13

## 2025-03-13 RX ADMIN — SODIUM CHLORIDE 75 MILLILITER(S): 9 INJECTION, SOLUTION INTRAVENOUS at 23:15

## 2025-03-13 RX ADMIN — Medication 0.1 MILLIGRAM(S): at 23:13

## 2025-03-13 NOTE — ED PROVIDER NOTE - CLINICAL SUMMARY MEDICAL DECISION MAKING FREE TEXT BOX
HPI:  68 y/o M w/ Hx of DLBCL (on chemotherapy, last session 2/27/25), on Bactrim and Valacyclovir prophylaxis, s/p renal transplant in 2012, s/p L nephrectomy, hypothyroidism on levothyroxine, retroperitoneal fibrosis, HTN, HLD, GERD, anxiety, depression, ANGELIKA, recent sacral OM who presents after being found down s/p unwitnessed fall. Patient is AOx1. History limited. Currently does not report any pain and denies head strike though small area of dried blood abrasion on top of head noted. Moving all 4 extremities. Recent admission in 2/2025 for urosepsis 2/2 ESBL klebsiella and discharged to rehab.     ROS:  As stated above.    FHx/SHx:   Non-contributory.    PE:   Vital signs reviewed.  GENERAL: No acute distress, non toxic-appearing  HEAD: (+) small abrasion to top of head with dried blood. normocephalic  EARS: Externally normal, atraumatic  EYES: No jaundice, not injected, no rupture, no foreign bodies  MOUTH: Moist mucous membranes  NECK: No swelling, no lymphadenopathy  HEART: Regular rate and rhythm, normal S1/S2, no murmurs, no rubs, no gallops  LUNGS: Clear to auscultation bilaterally without rhonchi, rales, or wheezing  ABDOMEN: Soft, non-distended, and non tender in all 4 quadrants  EXTREMITIES: No gross deformities  VASCULAR: Pulses palpable in all extremities, no pitting edema, capillary refill <2 secs  SKIN: No rash  PSYCH: Alert and oriented x 1  NEURO: Strength 5/5 and sensation intact in all 4 extremities.     A/P:  68 y/o M w/ extensive Hx who presents from [facility] rehab facility after being found down 2/2 unwitnessed fall. Vitals significant for fever to 38.2 C and exam unremarkable. HPI:  70 y/o M w/ Hx of DLBCL (on chemotherapy, last session 2/27/25), on Bactrim and Valacyclovir prophylaxis, s/p renal transplant in 2012, s/p L nephrectomy, hypothyroidism on levothyroxine, retroperitoneal fibrosis, HTN, HLD, GERD, anxiety, depression, ANGELIKA, recent sacral OM who presents after being found down s/p unwitnessed fall. Patient is AOx1. History limited. Currently does not report any pain and denies head strike though small area of dried blood abrasion on top of head noted. Moving all 4 extremities. Recent admission in 2/2025 for urosepsis 2/2 ESBL klebsiella and discharged to rehab.     ROS:  As stated above.    FHx/SHx:   Non-contributory.    PE:   Vital signs reviewed.  GENERAL: No acute distress, non toxic-appearing  HEAD: (+) small abrasion to top of head with dried blood. normocephalic  EARS: Externally normal, atraumatic  EYES: No jaundice, not injected, no rupture, no foreign bodies  MOUTH: Moist mucous membranes  NECK: No swelling, no lymphadenopathy  HEART: Regular rate and rhythm, normal S1/S2, no murmurs, no rubs, no gallops  LUNGS: Clear to auscultation bilaterally without rhonchi, rales, or wheezing  ABDOMEN: Soft, non-distended, and non tender in all 4 quadrants  EXTREMITIES: No gross deformities  VASCULAR: Pulses palpable in all extremities, no pitting edema, capillary refill <2 secs  SKIN: No rash  PSYCH: Alert and oriented x 1  NEURO: Strength 5/5 and sensation intact in all 4 extremities.     A/P:  70 y/o M w/ extensive Hx who presents from [facility] rehab facility after being found down 2/2 unwitnessed fall. Vitals significant for fever to 38.2 C and exam unremarkable. Concern for metabolic vs toxic vs structural encephalopathy vs infectious 2/2 sepsis given fever in triage.  - labs  - empiric vancomycin and piperacillin-tazobactam   - cultures  - admission for IV antibiotics HPI:  68 y/o M w/ Hx of DLBCL (on chemotherapy, last session 2/27/25), on Bactrim and Valacyclovir prophylaxis, s/p renal transplant in 2012, s/p L nephrectomy, hypothyroidism on levothyroxine, retroperitoneal fibrosis, HTN, HLD, GERD, anxiety, depression, ANGELIKA, recent sacral OM who presents after being found down s/p unwitnessed fall. Patient is AOx1. History limited. Currently does not report any pain and denies head strike though small area of dried blood abrasion on top of head noted. Moving all 4 extremities. Recent admission in 2/2025 for urosepsis 2/2 ESBL klebsiella and discharged to rehab.     ROS:  As stated above.    FHx/SHx:   Non-contributory.    PE:   Vital signs reviewed.  GENERAL: No acute distress, non toxic-appearing  HEAD: (+) small abrasion to top of head with dried blood. normocephalic  EARS: Externally normal, atraumatic  EYES: No jaundice, not injected, no rupture, no foreign bodies  MOUTH: Moist mucous membranes  NECK: No swelling, no lymphadenopathy  HEART: Regular rate and rhythm, normal S1/S2, no murmurs, no rubs, no gallops  LUNGS: Clear to auscultation bilaterally without rhonchi, rales, or wheezing  ABDOMEN: Soft, non-distended, and non tender in all 4 quadrants  EXTREMITIES: No gross deformities  VASCULAR: Pulses palpable in all extremities, no pitting edema, capillary refill <2 secs  SKIN: No rash  PSYCH: Alert and oriented x 1  NEURO: Strength 5/5 and sensation intact in all 4 extremities.     A/P:  68 y/o M w/ extensive Hx who presents from [facility] rehab facility after being found down 2/2 unwitnessed fall. Vitals significant for fever to 38.2 C and exam unremarkable. Concern for metabolic vs toxic vs structural encephalopathy vs infectious 2/2 sepsis given fever in triage.  - labs  - empiric vancomycin and piperacillin-tazobactam   - cultures  - admission for IV antibiotics    Dr. Hogan (Attending Physician)

## 2025-03-13 NOTE — H&P ADULT - PROBLEM SELECTOR PLAN 8
Hypothyroidism on levothyroxine 88 mcg    Plan:  -c/w home levothyroxine 88 mcg  -f/u TSH and free T4 Hx of Type 2 DM (A1c 8.4% in Jan 2025) and steroid induced hyperglycemia during PJP tx course. Per rehab records, pt is on 20 units glargine in PM and 10 units glargine in AM and Admelog 24/18/14 premeals    Plan:  - will dose reduce by 50% to 10 units glargine in PM and 5 units in AM + low dose ISS in setting of infection, consider moderate dose ISS if poor control  - monitor FS TIDAC and bedtime  - f/u HbA1c  - consistent carb diet

## 2025-03-13 NOTE — H&P ADULT - ASSESSMENT
69 year old M with a history of renal transplant 2012 (2/2 DM, s/p left nephrectomy), DLBCL (previously receiving R mini CHOP), peripheral neuropathy, hypothyroidism, retroperitoneal fibrosis, HTN, HLD, GERD, anxiety/depression, ANGELIKA and recent admission at [facility] for sepsis 2/2 Klebsiella pneumoniae ESBL UTI and AHRF w c/f PJP pneumonia (s/p atovaquone and pred) presenting after unwitnessed fall at his rehab facility admitted for sepsis likely 2/2 UTI and toxic metabolic encephalopathy, CT A/P w severe proctocolitis and cystitis  69 year old M with a history of renal transplant 2012 (on tacrolimus, 2/2 DM, s/p left nephrectomy), DLBCL (received cycle 1 R mini CHOP), peripheral neuropathy, hypothyroidism, retroperitoneal fibrosis, HTN, HLD, GERD, anxiety/depression, ANGELIKA and recent admission at [facility] (1/17 - 2/6/25) for sepsis 2/2 Klebsiella pneumoniae ESBL UTI and AHRF w c/f PJP pneumonia (s/p completion of atovaquone and pred course) presenting after unwitnessed fall at his rehab facility admitted for sepsis likely 2/2 UTI and toxic metabolic encephalopathy, CT A/P w severe proctocolitis and cystitis  69 year old M with a history of renal transplant (2012) on tacrolimus, s/p left nephrectomy, DLBCL s/p C1 R mini CHOP (chemo held 2/2 recent infections), peripheral neuropathy, hypothyroidism, retroperitoneal fibrosis encasing veins, HTN, HLD, GERD, anxiety/depression, ANGELIKA and recent admission at [facility] (1/17 - 2/6/25) for sepsis 2/2 Klebsiella pneumoniae ESBL UTI and AHRF c/f PJP pneumonia (s/p completion of atovaquone and pred course) pw unwitnessed fall, in setting of sepsis likely 2/2 UTI and toxic metabolic encephalopathy, CT A/P w severe proctocolitis and cystitis

## 2025-03-13 NOTE — H&P ADULT - PROBLEM SELECTOR PLAN 3
Unwitnessed fall at rehab. CT head w/o acute infarct, hematoma, or mass effect. CT C spine w/o acute fractures. Patient is unable to participate in history taking iso acute delirium. Suspect fall likely iso sepsis and toxic metabolic encephalopathy. BP stable in ED. EKG normal sinus rhythm, no evidence of arrhythmia    Plan:  - treatment of infection as above  - Obtain orthostatic vital signs when pt able to cooperate  - PT eval  -fall precautions Patient incoherent and aggressive on exam, likely toxic metabolic encephalopathy iso sepsis. CT head w/o acute infarct, hematoma, or mass effect.     Plan:  -treatment for infection as above  -f/u TSH, B12, RPR for additional reversible etiologies  -delirium precautions  -dysphagia screen and aspiration precautions Patient incoherent and aggressive on exam, likely toxic metabolic encephalopathy iso sepsis. CT head w/o acute infarct, hematoma, or mass effect.     Plan:  -treatment for infection as above  -f/u TSH, B12, RPR for additional reversible etiologies  -delirium precautions  -aspiration precautions

## 2025-03-13 NOTE — H&P ADULT - HISTORY OF PRESENT ILLNESS
69 year old M with a history of renal transplant 2012 (2/2 DM, s/p left nephrectomy), DLBCL (previously receiving R mini CHOP), peripheral neuropathy, hypothyroidism, retroperitoneal fibrosis, HTN, HLD, GERD, anxiety/depression, ANGELIKA and recent admission at [facility] for sepsis 2/2 Klebsiella pneumoniae ESBL UTI and AHRF w c/f PJP pneumonia (s/p atovaquone and pred) presenting after unwitnessed fall at his rehab facility. On assessment patient oriented to self and place but incoherent and verbally aggressive w tangential thoughts, refusing exam and assessment and unable to answer history questions. Attempted to call patient's wife who is the emergency contact but was not able to reach. Patient's son was contacted who reports his father "acts this way" when he has infections.     On arrival febrile to 38.2C  69 year old M with a history of renal transplant 2012 (2/2 DM, s/p left nephrectomy), DLBCL (previously receiving R mini CHOP), peripheral neuropathy, hypothyroidism, retroperitoneal fibrosis, HTN, HLD, GERD, anxiety/depression, ANGELIKA and recent admission at [facility] for sepsis 2/2 Klebsiella pneumoniae ESBL UTI and AHRF w c/f PJP pneumonia (s/p atovaquone and pred) presenting after unwitnessed fall at his rehab facility. On assessment patient oriented to self and place but incoherent and verbally aggressive w tangential thoughts, refusing exam and assessment and unable to answer history questions. Attempted to call patient's wife who is the emergency contact but was not able to reach. Patient's son was contacted who reports his father "acts this way" when he has infections.     On arrival febrile to 100.7 rectally, /65, HR 88, RR 18, Sat 100% on RA. RVP neg. CT head/ c spine neg for acute territorial infarct, hematoma or mass effect. No acute fracture or subluxation of the cervical spine. CT A/P with w severe proctocolitis and cystitis. Received Vanc and zosyn x1, 1 L LR bolus,    69 year old M with a history of renal transplant 2012 (on tacrolimus, 2/2 DM, s/p left nephrectomy), DLBCL (received cycle 1 R mini CHOP), peripheral neuropathy, hypothyroidism, retroperitoneal fibrosis, HTN, HLD, GERD, anxiety/depression, ANGELIKA and recent admission at [facility] (1/17 - 2/6/25) for sepsis 2/2 Klebsiella pneumoniae ESBL UTI and AHRF w c/f PJP pneumonia (s/p completion of atovaquone and pred course) presenting after unwitnessed fall at his rehab facility. On assessment patient oriented to self and place but incoherent and verbally aggressive w tangential thoughts, refusing exam and assessment and unable to answer history questions. Details regarding the fall are limited as collateral information limited. Attempted to call patient's wife who is the emergency contact but was not able to reach. Patient's son was contacted who reports his father "acts this way" when he has infections but was unsure about preceding events.     On arrival febrile to 100.7 rectally, /65, HR 88, RR 18, Sat 100% on RA. RVP neg. UA w mod leuk esterase, neg nitrite, 0-2 wbc. CT head/ c spine neg for acute territorial infarct, hematoma or mass effect. No acute fracture or subluxation of the cervical spine. CT A/P with w severe proctocolitis and cystitis. Received Vanc and zosyn x1, 1 L LR bolus,    69 year old M with a history of renal transplant (2012) on tacrolimus, s/p left nephrectomy, DLBCL s/p C1 R mini CHOP (chemo held 2/2 recent infections), peripheral neuropathy, hypothyroidism, retroperitoneal fibrosis encasing veins, HTN, HLD, GERD, anxiety/depression, ANGELIKA and recent admission at [facility] (1/17 - 2/6/25) for sepsis 2/2 Klebsiella pneumoniae ESBL UTI and AHRF c/f PJP pneumonia (s/p completion of atovaquone and pred course) presenting after unwitnessed fall at his rehab facility.     On assessment patient oriented to self and place but incoherent and verbally aggressive w tangential thoughts, refusing exam and assessment and unable to answer history questions. Details regarding the fall are limited as collateral information limited. Attempted to call patient's wife who is the emergency contact but was not able to reach. Patient's son was contacted who reports his father "acts this way" when he has infections but was unsure about preceding events.     On arrival febrile to 100.7 rectally, /65, HR 88, RR 18, Sat 100% on RA. RVP neg. UA w mod leuk esterase, neg nitrite, 0-2 wbc. CT head/ c spine neg for acute territorial infarct, hematoma or mass effect. No acute fracture or subluxation of the cervical spine. CT A/P with w severe proctocolitis and cystitis. Received Vanc and zosyn x1, 1 L LR bolus,

## 2025-03-13 NOTE — H&P ADULT - ATTENDING COMMENTS
Pt was seen and examined during key portion of E/M service. Case discussed with resident. H&P reviewed and edited where appropriate. Other than the following, I agree with the above history, exam, assessment, and plan.  69 year old M with a history of renal transplant (2012) on tacrolimus, s/p left nephrectomy, DLBCL s/p C1 R mini CHOP (chemo held 2/2 recent infections), peripheral neuropathy, hypothyroidism, retroperitoneal fibrosis encasing veins, HTN, HLD, GERD, anxiety/depression, ANGELIKA and recent admission at [facility] (1/17 - 2/6/25) for sepsis 2/2 Klebsiella pneumoniae ESBL UTI and AHRF c/f PJP pneumonia (s/p completion of atovaquone and pred course) pw unwitnessed fall, in setting of sepsis likely 2/2 UTI and toxic metabolic encephalopathy, CT A/P w severe proctocolitis and cystitis.  ertapenem. stool softeners. orthostats. PT eval. fall precaution. cont home tacro. transplant ID and onc consults. f/u cultures

## 2025-03-13 NOTE — H&P ADULT - PROBLEM SELECTOR PLAN 4
BP in 140s systolic in ED. Per rehab med list pt is on Carvedilol 12.5 mg BID, clonidine 0.1 mg TID, hydral 100 mg q8    Plan:  -c/w clonidine 0.1 mg TID to prevent rebound HTN  -hold remaining antihypertensives in setting of sepsis Unwitnessed fall at rehab. CT head w/o acute infarct, hematoma, or mass effect. CT C spine w/o acute fractures. Patient is unable to participate in history taking iso acute delirium. Suspect fall likely iso sepsis and toxic metabolic encephalopathy. BP stable in ED. EKG normal sinus rhythm, no evidence of arrhythmia    Plan:  - treatment of infection as above  - Obtain orthostatic vital signs when pt able to cooperate  - PT eval  -fall precautions

## 2025-03-13 NOTE — ED PROVIDER NOTE - PROGRESS NOTE DETAILS
Tejinder Hannon (EM/IM PGY-1). Patient continues to be febrile with multiple episodes of diarrhea in the ED. CT head and c-spine negative. Labs significant for mild leukocytosis to 10 with UA showing moderate LEs. Currently pending CT A/P and repeat CXR as well as culture results. To be admitted for IV antibiotics.

## 2025-03-13 NOTE — H&P ADULT - PROBLEM SELECTOR PLAN 12
DVT ppx: heparin subq  Diet: CC/DASH  Dispo: pending clinical course  Code status: DNR/DNI w trial NIV per prior MOLST in chart from rehab

## 2025-03-13 NOTE — H&P ADULT - PROBLEM SELECTOR PLAN 5
Renal tx recipient on tacrolimus 1 mg BID, valacyclovir 500 mg q12, and bactrim DS daily for ppx per med rec. Cr on admission 0.85, appears around baseline.    Plan:  -f/u tacro level  - consult transplant nephrology  -c/w tacrolimus pending level  -c/w valacyclovir 500 mg q12 for ppx Renal tx recipient on tacrolimus 1 mg BID, valacyclovir 500 mg q12, and bactrim DS daily for ppx per med rec. Cr on admission 0.85, appears around baseline.    Plan:  -c/w tacro 1 mg BID and f/u tacro level before AM dose  - consult transplant nephrology in AM  -c/w valacyclovir 500 mg q12 for ppx  -c/w bactrim DS ppx Renal tx recipient on tacrolimus 1 mg BID, valacyclovir 500 mg q12, and bactrim DS daily for ppx per med rec. MMF was on hold pending transplant neph f/u. Cr on admission 0.85, appears around baseline.    Plan:  -c/w tacro 1 mg BID and f/u tacro level before AM dose  - holding mycophenalate  - consult transplant nephrology and transplant ID in AM  -c/w valacyclovir 500 mg q12 for ppx  -c/w bactrim DS ppx

## 2025-03-13 NOTE — H&P ADULT - PROBLEM SELECTOR PLAN 2
Patient incoherent and aggressive on exam, likely toxic metabolic encephalopathy iso sepsis. CT head w/o acute infarct, hematoma, or mass effect.     Plan:  -treatment for infection as above  -f/u TSH, B12, RPR for additional reversible etiologies  -delirium precautions  -dysphagia screen and aspiration precautions UA with moderate leuk esterase, neg nitrtrite, 0-2 wbc. Patient unable to endorse symptoms 2/2 encephalopathy. Hx of Klebsiella ESBL UTIs    Plan:  -broaden to Ertapenem given history of ESBL UTIs, de-escalate based on culture data (3/13-  -as above for sepsis  -transplant ID consult in AM UA with moderate leuk esterase, neg nitrite, 0-2 wbc. CT A/P with evidence of cystitis. Patient unable to endorse symptoms 2/2 encephalopathy. Hx of Klebsiella ESBL UTIs    Plan:  -broaden to Ertapenem given history of ESBL UTIs, de-escalate based on culture data (3/13-  -as above for sepsis  -transplant ID consult in AM

## 2025-03-13 NOTE — H&P ADULT - NSHPPHYSICALEXAM_GEN_ALL_CORE
VITALS:   T(C): 36.8 (03-13-25 @ 20:29), Max: 38.2 (03-13-25 @ 15:25)  HR: 80 (03-13-25 @ 20:29) (74 - 88)  BP: 145/65 (03-13-25 @ 20:29) (142/60 - 147/75)  RR: 18 (03-13-25 @ 20:29) (18 - 20)  SpO2: 98% (03-13-25 @ 20:29) (98% - 100%)    GENERAL: agitated  HEAD:  Atraumatic, normocephalic  EYES: EOMI, PERRLA, conjunctiva and sclera clear  ENT: Dry mucous membranes  NECK: Supple, no JVD  HEART: Regular rate and rhythm, no murmurs, rubs, or gallops  LUNGS: Unlabored respirations.  Clear to auscultation bilaterally, no crackles, wheezing, or rhonchi  ABDOMEN: Soft, nontender, nondistended, +BS  EXTREMITIES: 2+ peripheral pulses bilaterally. No clubbing, cyanosis, or edema  NERVOUS SYSTEM:  A&Ox2, incoherent, tangential thoughts, aggressive, moving all extremities   SKIN: No rashes or lesions

## 2025-03-13 NOTE — H&P ADULT - NSICDXPASTMEDICALHX_GEN_ALL_CORE_FT
PAST MEDICAL HISTORY:  Depression     Diabetes Mellitus Type II Insulin pump (2010)    DLBCL (diffuse large B cell lymphoma)     GERD (gastroesophageal reflux disease)     History of renal transplant     Hyperlipidemia     Hypertension     Hypothyroidism     Hypothyroidism     Hypothyroidism     Infectious disease     Kidney transplanted 2012    ANGELIKA (obstructive sleep apnea)     Peripheral neuropathy     Shoulder fracture Left.    Transplanted kidney     Type 2 diabetes mellitus

## 2025-03-13 NOTE — H&P ADULT - PROBLEM SELECTOR PLAN 9
c/w ASA and crestor Hypothyroidism on levothyroxine 88 mcg    Plan:  -c/w home levothyroxine 88 mcg  -f/u TSH and free T4

## 2025-03-13 NOTE — H&P ADULT - PROBLEM SELECTOR PLAN 6
Previous admission in Jan 2025 for R mini chip, but chemo held at the time due to AHRF c/f PJP PNA    Plan:  -c/w bactrim DS daily for PJP ppx  -heme onc f/u in AM BP in 140s systolic in ED. Per rehab med list pt is on Carvedilol 12.5 mg BID, clonidine 0.1 mg TID, hydralazine 100 mg q8    Plan:  -c/w clonidine 0.1 mg TID w hold parameters to prevent rebound HTN  -c/w Coreg 12.5 mg BID w hold parameters  -holding hydral

## 2025-03-13 NOTE — ED ADULT NURSE NOTE - NSFALLRISKINTERV_ED_ALL_ED
Assistance OOB with selected safe patient handling equipment if applicable/Assistance with ambulation/Communicate fall risk and risk factors to all staff, patient, and family/Monitor gait and stability/Provide visual cue: yellow wristband, yellow gown, etc/Reinforce activity limits and safety measures with patient and family/Call bell, personal items and telephone in reach/Instruct patient to call for assistance before getting out of bed/chair/stretcher/Non-slip footwear applied when patient is off stretcher/[place] to call system/Physically safe environment - no spills, clutter or unnecessary equipment/Purposeful Proactive Rounding/Room/bathroom lighting operational, light cord in reach

## 2025-03-13 NOTE — H&P ADULT - PROBLEM SELECTOR PLAN 7
Hx of Type 2 DM (A1c 8.4% in Jan 2025) and steroid induced hyperglycemia during PJP tx course. Per rehab records, pt is on 20 units glargine in PM and 10 units glargine in AM and Admelog 24/18/14 premeals    Plan:  - will dose reduce by 50% to 10 units glargine in PM and 5 units in AM + low dose ISS in setting of infection  - monitor FS TIDAC and bedtime  - f/u HbA1c  - consistent carb diet Previous admission in Jan- Feb 2025 for R mini chop cycle 2, but chemo held at the time due to AHRF c/f PJP PNA, completed atovaquone/pred course    Plan:  -c/w bactrim DS daily for PJP ppx  -heme onc consult in AM

## 2025-03-13 NOTE — ED ADULT NURSE REASSESSMENT NOTE - NS ED NURSE REASSESS COMMENT FT1
PT having multiple loose and liquidy bowel movements and vomiting, soaking his entire bed, sheets, stretcher, and gown. PT changed and cleaned, new linen applied, and PT placed in new gown.

## 2025-03-13 NOTE — H&P ADULT - PROBLEM SELECTOR PLAN 11
DVT ppx: heparin subq  Diet: CC pending dysphagia screen  Dispo: pending clinical course  Code status: DNR/DNI w trial NIV per prior MOLST in chart c/w home escitalopram and bupropion

## 2025-03-13 NOTE — H&P ADULT - NSHPLABSRESULTS_GEN_ALL_CORE
LABS:                          11.9   10.55 )-----------( 443      ( 13 Mar 2025 15:26 )             37.3     03-13    136  |  103  |  20  ----------------------------<  133[H]  5.1   |  19[L]  |  0.85    Ca    9.3      13 Mar 2025 15:26  Mg     2.4     03-13    TPro  6.9  /  Alb  3.3  /  TBili  0.6  /  DBili  x   /  AST  25  /  ALT  22  /  AlkPhos  112  03-13    CT head/ C spine: No CT evidence for acute territorial infarct, hematoma or mass effect. No acute fracture or subluxation of the cervical spine.    CT A/P  Evaluation is somewhat limited due to patient motion artifact and streak   artifact from a left hip arthroplasty.    LOWER CHEST: Coronary artery calcifications. Small pericardial effusion.   Rounded opacities in the left lower lobe suggestive of round atelectasis.   Trace left pleural effusion.    LIVER: 2.2 cm right hepatic lobe hypodensity, previously 3.2 cm on   1/23/2025.  BILE DUCTS: Normal caliber.  GALLBLADDER: Cholecystectomy.  SPLEEN: Peripheral hypodense area within the medial spleen measuring   approximately 3.8 x 4.7 cm (13-46), decreased in size from prior MRI   dated 12/7/2024.  PANCREAS: Within normal limits.  ADRENALS: Within normal limits.  KIDNEYS/URETERS: Left nephrectomy and atrophic right kidney. Linear   isodense focus in the interpolar region is unchanged. Right lower   quadrant renal transplant. No hydronephrosis.    BLADDER: Mild diffuse bladder wall thickening.  REPRODUCTIVE ORGANS: Prostate size is normal.    BOWEL: No bowel obstruction. Small hiatal hernia. Appendix is not   visualized. Diffuse rectal and sigmoid wall thickening.  PERITONEUM/RETROPERITONEUM: Unchanged retroperitoneal soft tissue   encasement of the aorta and IVC.  VESSELS: Extensive calcified atherosclerosis of the aorta. No aortic   aneurysm.  LYMPH NODES: No lymphadenopathy.  ABDOMINAL WALL: Postsurgical changes of the ventral abdominal wall.   Subcutaneous edema.  BONES: The bones are markedly heterogeneous and there is lucency   throughout the sacrum bilaterally and a nondisplaced fracture. No   evidence of left iliac bone (13-11), similar to prior study. Degenerative   changes of the spine. Left hip arthroplasty.    IMPRESSION:  Diffuse rectal and sigmoid wall likely due to a severe proctocolitis.    Mild bladder wall thickening with adjacent fat stranding, likely due to   cystitis.    Low-attenuation lesions within the spleen and central liver, decreased in   size from 12/9/2024 compatible positive response to treatment.    Unchanged retroperitoneal soft tissue encasing the aorta and IVC, which   could be due to post transplant lymphoproliferative disease given the   history of prior kidney transplant or retroperitoneal fibrosis.    Persistent lucency and heterogeneity within the sacrum, which could be   due to the patient's lymphoma, though ostium colitis could have a similar   appearance in the appropriate clinical setting.

## 2025-03-13 NOTE — ED ADULT NURSE NOTE - OBJECTIVE STATEMENT
PT is a 69 year old male (currently A&OX0 and baseline A&OX4) with PMH of DLBCL (on chemotherapy, last session 2/27/25), on Bactrim and Valacyclovir prophylaxis, s/p renal transplant in 2012, s/p L nephrectomy, hypothyroidism on levothyroxine, retroperitoneal fibrosis, HTN, HLD, GERD, anxiety, depression, ANGELIKA, recent sacral OM who presents to the ED via EMS with c/o vomiting, diarrhea, and AMS. Per EMS, PT had an unwitnessed fall today and was found down. PT has been altered since yesterday and with multiple episodes of vomiting and diarrhea. PT agitated and confused, unable to provide subjective information. PT is resting comfortably in bed, breathing unlabored on room air, and speaking in complete sentences but not making any sense. Abdomen is soft, non-tender, and non-distended. Skin is warm and dry, no diaphoresis noted. No edema noted to B/L extremities. Strong strength in B/L extremities, sensation intact. PICC line noted to LESLIEE and fistula noted to RUE. PT placed in hospital gown and changed already multiple times since arrival due to vomiting and diarrhea. Safety and comfort maintained.

## 2025-03-13 NOTE — H&P ADULT - PROBLEM SELECTOR PLAN 1
Febrile to 100.7 rectally, saturating well on RA. WBC 10.55 w 90% neutrophils, lactate 2.7. CT A/P with diffuse rectal and sigmoid wall likely due to a severe proctocolitis. Mild bladder wall thickening with adjacent fat stranding, likely due to cystitis, retroperitoneal fibrosis, heterogeneity within the sacrum 2/2 lymphoma vs ostium colitis. CXR clear. RVP neg.    Plan:  -c/w Ertapenem given history of ESBL UTIs, de-escalate based on culture data  -f/u infectious workup: blood cx, urine cx, MRSA swab  -ID consult in AM Febrile to 100.7 rectally, saturating well on RA. WBC 10.55 w 90% neutrophils, lactate 2.7. CT A/P with diffuse rectal and sigmoid wall likely due to a severe proctocolitis. Mild bladder wall thickening with adjacent fat stranding, likely due to cystitis, retroperitoneal fibrosis, heterogeneity within the sacrum 2/2 lymphoma vs ostium colitis. CXR clear. RVP neg. s/p Vanc and zosyn in ED    Plan:  -broaden to Ertapenem given history of ESBL UTIs, de-escalate based on culture data (3/13-  -f/u infectious workup: blood cx, urine cx, MRSA swab  -ID consult in AM  -bowel regimen for proctocolitis Febrile to 100.7 rectally, saturating well on RA. WBC 10.55 w 90% neutrophils, lactate 2.7. CT A/P with diffuse rectal and sigmoid wall likely due to a severe proctocolitis. Mild bladder wall thickening with adjacent fat stranding, likely due to cystitis, retroperitoneal fibrosis, heterogeneity within the sacrum 2/2 lymphoma vs ostium colitis. CXR clear. RVP neg. s/p Vanc and zosyn in ED    Plan:  -broaden to Ertapenem given history of ESBL UTIs, de-escalate based on culture data (3/13-  -f/u infectious workup: blood cx, urine cx, MRSA swab  -transplant ID consult in AM  -bowel regimen for proctocolitis Febrile to 100.7 rectally, saturating well on RA. WBC 10.55 w 90% neutrophils, lactate 2.7. UA positive and CT A/P with diffuse rectal and sigmoid wall likely due to a severe proctocolitis. Mild bladder wall thickening with adjacent fat stranding, likely due to cystitis, retroperitoneal fibrosis, heterogeneity within the sacrum 2/2 lymphoma vs ostium colitis. CXR clear. RVP neg. s/p Vanc and zosyn in ED    Plan:  -broaden to Ertapenem given history of ESBL UTIs, de-escalate based on culture data (3/13-  -c/w IVF LR 75 cc/hr and f/u repeat lactate in AM  -f/u infectious workup: blood cx, urine cx, MRSA swab  -transplant ID consult in AM  -bowel regimen for proctocolitis

## 2025-03-13 NOTE — ED ADULT NURSE REASSESSMENT NOTE - NS ED NURSE REASSESS COMMENT FT1
PT straight catheterized under sterile technique with 2 RN's at the bedside as per MD order. PT tolerated well. Approximately 275 mL dark alden urine drained. UA/UC sent as per MD order. Safety and comfort maintained. Call bell within reach.

## 2025-03-14 ENCOUNTER — RESULT REVIEW (OUTPATIENT)
Age: 70
End: 2025-03-14

## 2025-03-14 DIAGNOSIS — N39.0 URINARY TRACT INFECTION, SITE NOT SPECIFIED: ICD-10-CM

## 2025-03-14 LAB
-  COAGULASE NEGATIVE STAPHYLOCOCCUS: SIGNIFICANT CHANGE UP
A1C WITH ESTIMATED AVERAGE GLUCOSE RESULT: 10.1 % — HIGH (ref 4–5.6)
A1C WITH ESTIMATED AVERAGE GLUCOSE RESULT: 9.6 % — HIGH (ref 4–5.6)
ANION GAP SERPL CALC-SCNC: 11 MMOL/L — SIGNIFICANT CHANGE UP (ref 5–17)
BASOPHILS # BLD AUTO: 0.09 K/UL — SIGNIFICANT CHANGE UP (ref 0–0.2)
BASOPHILS NFR BLD AUTO: 0.9 % — SIGNIFICANT CHANGE UP (ref 0–2)
BUN SERPL-MCNC: 21 MG/DL — SIGNIFICANT CHANGE UP (ref 7–23)
C DIFF GDH STL QL: NEGATIVE — SIGNIFICANT CHANGE UP
C DIFF GDH STL QL: SIGNIFICANT CHANGE UP
CALCIUM SERPL-MCNC: 8.4 MG/DL — SIGNIFICANT CHANGE UP (ref 8.4–10.5)
CAMPYLOBACTER DNA SPEC NAA+PROBE: DETECTED
CHLORIDE SERPL-SCNC: 101 MMOL/L — SIGNIFICANT CHANGE UP (ref 96–108)
CK SERPL-CCNC: 48 U/L — SIGNIFICANT CHANGE UP (ref 30–200)
CREAT SERPL-MCNC: 0.96 MG/DL — SIGNIFICANT CHANGE UP (ref 0.5–1.3)
EGFR: 87 ML/MIN/1.73M2 — SIGNIFICANT CHANGE UP
EGFR: 87 ML/MIN/1.73M2 — SIGNIFICANT CHANGE UP
EOSINOPHIL # BLD AUTO: 0 K/UL — SIGNIFICANT CHANGE UP (ref 0–0.5)
EOSINOPHIL NFR BLD AUTO: 0 % — SIGNIFICANT CHANGE UP (ref 0–6)
ESTIMATED AVERAGE GLUCOSE: 229 MG/DL — HIGH (ref 68–114)
ESTIMATED AVERAGE GLUCOSE: 243 MG/DL — HIGH (ref 68–114)
FERRITIN SERPL-MCNC: 569 NG/ML — HIGH (ref 30–400)
GAS PNL BLDV: SIGNIFICANT CHANGE UP
GI PCR PANEL: DETECTED
GLUCOSE BLDC GLUCOMTR-MCNC: 134 MG/DL — HIGH (ref 70–99)
GLUCOSE BLDC GLUCOMTR-MCNC: 248 MG/DL — HIGH (ref 70–99)
GLUCOSE BLDC GLUCOMTR-MCNC: 258 MG/DL — HIGH (ref 70–99)
GLUCOSE SERPL-MCNC: 151 MG/DL — HIGH (ref 70–99)
GRAM STN FLD: ABNORMAL
GRAM STN FLD: ABNORMAL
HCT VFR BLD CALC: 29 % — LOW (ref 39–50)
HGB BLD-MCNC: 9.5 G/DL — LOW (ref 13–17)
IRON SATN MFR SERPL: 12 UG/DL — LOW (ref 45–165)
IRON SATN MFR SERPL: 6 % — LOW (ref 16–55)
LYMPHOCYTES # BLD AUTO: 0.45 K/UL — LOW (ref 1–3.3)
LYMPHOCYTES # BLD AUTO: 4.4 % — LOW (ref 13–44)
MAGNESIUM SERPL-MCNC: 2 MG/DL — SIGNIFICANT CHANGE UP (ref 1.6–2.6)
MANUAL SMEAR VERIFICATION: SIGNIFICANT CHANGE UP
MCHC RBC-ENTMCNC: 31.1 PG — SIGNIFICANT CHANGE UP (ref 27–34)
MCHC RBC-ENTMCNC: 32.8 G/DL — SIGNIFICANT CHANGE UP (ref 32–36)
MCV RBC AUTO: 95.1 FL — SIGNIFICANT CHANGE UP (ref 80–100)
METHOD TYPE: SIGNIFICANT CHANGE UP
MONOCYTES # BLD AUTO: 1.16 K/UL — HIGH (ref 0–0.9)
MONOCYTES NFR BLD AUTO: 11.4 % — SIGNIFICANT CHANGE UP (ref 2–14)
MRSA PCR RESULT.: SIGNIFICANT CHANGE UP
MRSA PCR RESULT.: SIGNIFICANT CHANGE UP
NEUTROPHILS # BLD AUTO: 8.5 K/UL — HIGH (ref 1.8–7.4)
NEUTROPHILS NFR BLD AUTO: 74.5 % — SIGNIFICANT CHANGE UP (ref 43–77)
NEUTS BAND # BLD: 8.8 % — HIGH (ref 0–8)
NEUTS BAND NFR BLD: 8.8 % — HIGH (ref 0–8)
PHOSPHATE SERPL-MCNC: 2.8 MG/DL — SIGNIFICANT CHANGE UP (ref 2.5–4.5)
PLATELET # BLD AUTO: 285 K/UL — SIGNIFICANT CHANGE UP (ref 150–400)
POTASSIUM SERPL-MCNC: 3.9 MMOL/L — SIGNIFICANT CHANGE UP (ref 3.5–5.3)
RBC # BLD: 3.05 M/UL — LOW (ref 4.2–5.8)
RBC # FLD: 16.8 % — HIGH (ref 10.3–14.5)
RBC BLD AUTO: SIGNIFICANT CHANGE UP
S AUREUS DNA NOSE QL NAA+PROBE: SIGNIFICANT CHANGE UP
S AUREUS DNA NOSE QL NAA+PROBE: SIGNIFICANT CHANGE UP
SODIUM SERPL-SCNC: 133 MMOL/L — LOW (ref 135–145)
SPECIMEN SOURCE: SIGNIFICANT CHANGE UP
SPECIMEN SOURCE: SIGNIFICANT CHANGE UP
TIBC SERPL-MCNC: 189 UG/DL — LOW (ref 220–430)
UIBC SERPL-MCNC: 177 UG/DL — SIGNIFICANT CHANGE UP (ref 110–370)
VANCOMYCIN FLD-MCNC: 4.7 UG/ML — SIGNIFICANT CHANGE UP
WBC # BLD: 10.2 K/UL — SIGNIFICANT CHANGE UP (ref 3.8–10.5)
WBC # FLD AUTO: 10.2 K/UL — SIGNIFICANT CHANGE UP (ref 3.8–10.5)

## 2025-03-14 PROCEDURE — 99233 SBSQ HOSP IP/OBS HIGH 50: CPT | Mod: GC

## 2025-03-14 PROCEDURE — 93306 TTE W/DOPPLER COMPLETE: CPT | Mod: 26

## 2025-03-14 PROCEDURE — G0545: CPT

## 2025-03-14 PROCEDURE — 99222 1ST HOSP IP/OBS MODERATE 55: CPT | Mod: GC

## 2025-03-14 PROCEDURE — 99223 1ST HOSP IP/OBS HIGH 75: CPT

## 2025-03-14 RX ORDER — VANCOMYCIN HCL IN 5 % DEXTROSE 1.5G/250ML
1000 PLASTIC BAG, INJECTION (ML) INTRAVENOUS ONCE
Refills: 0 | Status: COMPLETED | OUTPATIENT
Start: 2025-03-14 | End: 2025-03-14

## 2025-03-14 RX ORDER — DAPTOMYCIN 500 MG/10ML
650 INJECTION, POWDER, LYOPHILIZED, FOR SOLUTION INTRAVENOUS EVERY 24 HOURS
Refills: 0 | Status: DISCONTINUED | OUTPATIENT
Start: 2025-03-15 | End: 2025-03-18

## 2025-03-14 RX ORDER — ACETAMINOPHEN 500 MG/5ML
1000 LIQUID (ML) ORAL EVERY 6 HOURS
Refills: 0 | Status: DISCONTINUED | OUTPATIENT
Start: 2025-03-14 | End: 2025-03-20

## 2025-03-14 RX ORDER — IRON SUCROSE 20 MG/ML
200 INJECTION, SOLUTION INTRAVENOUS EVERY 24 HOURS
Refills: 0 | Status: COMPLETED | OUTPATIENT
Start: 2025-03-14 | End: 2025-03-18

## 2025-03-14 RX ORDER — DAPTOMYCIN 500 MG/10ML
INJECTION, POWDER, LYOPHILIZED, FOR SOLUTION INTRAVENOUS
Refills: 0 | Status: DISCONTINUED | OUTPATIENT
Start: 2025-03-14 | End: 2025-03-18

## 2025-03-14 RX ORDER — PREDNISONE 20 MG/1
5 TABLET ORAL DAILY
Refills: 0 | Status: DISCONTINUED | OUTPATIENT
Start: 2025-03-14 | End: 2025-03-21

## 2025-03-14 RX ORDER — SENNA 187 MG
2 TABLET ORAL AT BEDTIME
Refills: 0 | Status: DISCONTINUED | OUTPATIENT
Start: 2025-03-14 | End: 2025-03-21

## 2025-03-14 RX ORDER — POLYETHYLENE GLYCOL 3350 17 G/17G
17 POWDER, FOR SOLUTION ORAL
Refills: 0 | Status: DISCONTINUED | OUTPATIENT
Start: 2025-03-14 | End: 2025-03-21

## 2025-03-14 RX ORDER — DAPTOMYCIN 500 MG/10ML
650 INJECTION, POWDER, LYOPHILIZED, FOR SOLUTION INTRAVENOUS ONCE
Refills: 0 | Status: COMPLETED | OUTPATIENT
Start: 2025-03-14 | End: 2025-03-14

## 2025-03-14 RX ADMIN — Medication 250 MILLIGRAM(S): at 14:36

## 2025-03-14 RX ADMIN — DAPTOMYCIN 650 MILLIGRAM(S): 500 INJECTION, POWDER, LYOPHILIZED, FOR SOLUTION INTRAVENOUS at 18:24

## 2025-03-14 RX ADMIN — Medication 40 MILLIGRAM(S): at 06:17

## 2025-03-14 RX ADMIN — Medication 500 MILLIGRAM(S): at 17:00

## 2025-03-14 RX ADMIN — PREDNISONE 5 MILLIGRAM(S): 20 TABLET ORAL at 17:03

## 2025-03-14 RX ADMIN — Medication 1 TABLET(S): at 11:16

## 2025-03-14 RX ADMIN — HEPARIN SODIUM 5000 UNIT(S): 1000 INJECTION INTRAVENOUS; SUBCUTANEOUS at 06:17

## 2025-03-14 RX ADMIN — IRON SUCROSE 110 MILLIGRAM(S): 20 INJECTION, SOLUTION INTRAVENOUS at 15:48

## 2025-03-14 RX ADMIN — BUPROPION HYDROBROMIDE 300 MILLIGRAM(S): 522 TABLET, EXTENDED RELEASE ORAL at 11:16

## 2025-03-14 RX ADMIN — INSULIN GLARGINE-YFGN 5 UNIT(S): 100 INJECTION, SOLUTION SUBCUTANEOUS at 09:19

## 2025-03-14 RX ADMIN — INSULIN LISPRO 3: 100 INJECTION, SOLUTION INTRAVENOUS; SUBCUTANEOUS at 12:35

## 2025-03-14 RX ADMIN — HEPARIN SODIUM 5000 UNIT(S): 1000 INJECTION INTRAVENOUS; SUBCUTANEOUS at 14:36

## 2025-03-14 RX ADMIN — Medication 88 MICROGRAM(S): at 06:16

## 2025-03-14 RX ADMIN — ESCITALOPRAM OXALATE 10 MILLIGRAM(S): 20 TABLET ORAL at 11:16

## 2025-03-14 RX ADMIN — INSULIN GLARGINE-YFGN 10 UNIT(S): 100 INJECTION, SOLUTION SUBCUTANEOUS at 00:30

## 2025-03-14 RX ADMIN — Medication 0.1 MILLIGRAM(S): at 14:36

## 2025-03-14 RX ADMIN — Medication 500 MILLIGRAM(S): at 06:17

## 2025-03-14 RX ADMIN — TACROLIMUS 1 MILLIGRAM(S): 0.5 CAPSULE ORAL at 08:24

## 2025-03-14 RX ADMIN — Medication 0.1 MILLIGRAM(S): at 06:16

## 2025-03-14 RX ADMIN — INSULIN LISPRO 3: 100 INJECTION, SOLUTION INTRAVENOUS; SUBCUTANEOUS at 17:00

## 2025-03-14 RX ADMIN — Medication 81 MILLIGRAM(S): at 11:16

## 2025-03-14 RX ADMIN — Medication 1 APPLICATION(S): at 17:02

## 2025-03-14 NOTE — PHYSICAL THERAPY INITIAL EVALUATION ADULT - GENERAL OBSERVATIONS, REHAB EVAL
Pt received semi-supine in bed, A&0x2-3, confused, +condom cath, +IV, following 100% simple step commands. Pt presenting after unwitnessed fall at his rehab facility.

## 2025-03-14 NOTE — CONSULT NOTE ADULT - ASSESSMENT
69M with a PMH of DM, HTN, HLD, ESRD on HD s/p LKRT 2012 (from brother), hypothyroidism, recently diagnosed DLBCL on RCHOP who presented from rehab s/p fall and found to be bacteremic. Transplant nephrology consulted for immunosuppression management.     1. Renal Transplant Recipient  - S/p LKRT 2012 from brother  - Baseline Scr ~0.8, Scr stable at 0.9 today  - Pt follows with Transplant [facility]ph at [facility] (Dr. King)  - Monitor labs, urine output. Avoid nephrotoxins. Dose meds per eGFR    2. Immunosuppression  - Regimen recently changed due to RCHOP therapy  - Pt and spouse unaware of meds. Called rehab, no answer. Called Dr. King's office- awaiting call back ([phone]).   - Per last inpatient notes, pt to be on Tacrolimus 1mg BID and Pred 5mg daily  - Check Tacrolimus level qam (~30min prior to dose)    3. Bacteremia  - Transplant ID consult

## 2025-03-14 NOTE — PHYSICAL THERAPY INITIAL EVALUATION ADULT - ADDITIONAL COMMENTS
Pt is a poor historian; Pt reports he was most recently at rehab. Pt reports he has not been ambulatory for a long time (Unable to state time frame). Pt reports before rehab he was living in a private house with his wife. Pt is a poor historian; Spoke to wife on phone. Pt has been in rehab for the past month. Pt has been non-ambulatory since September. Wife reports PT at rehab has been working on standing and walking. Prior to rehab they reside together in a private house.

## 2025-03-14 NOTE — PROVIDER CONTACT NOTE (OTHER) - ASSESSMENT
Pt is A&Ox2. Pt is agitated, not listening to any communication or education. Pt is A&Ox2. Pt is agitated, not listening to any communication or education. Pt is continuously screaming to get out.

## 2025-03-14 NOTE — PROGRESS NOTE ADULT - PROBLEM SELECTOR PLAN 6
BP in 140s systolic in ED. Per rehab med list pt is on Carvedilol 12.5 mg BID, clonidine 0.1 mg TID, hydralazine 100 mg q8    Plan:  -c/w clonidine 0.1 mg TID w hold parameters to prevent rebound HTN  -c/w Coreg 12.5 mg BID w hold parameters  -holding hydral

## 2025-03-14 NOTE — PROGRESS NOTE ADULT - PROBLEM SELECTOR PLAN 1
Patient was found to have Grandp bacteremia. Transplant ID was called for further management. Will give a dose of vancomycin IV for now until further recommendations. Will get a trans thoracic echocardiogram for of potential endocarditis.

## 2025-03-14 NOTE — PATIENT PROFILE ADULT - DO YOU EVER NEED HELP READING HOSPITAL MATERIALS?
Call placed to Shonna with [facility]. SHANTANU gave LUAN Og the number for Shonna to discuss.           SW/CM [phone]  Receiving Agency/Facility address: [address] [place] [address]  Receiving Agency/Facility city/state: [place], [place]  Receiving Agency/Facility Type: Skilled Nursing Facility - Subacute   no

## 2025-03-14 NOTE — PROGRESS NOTE ADULT - PROBLEM SELECTOR PLAN 5
Plan:  -c/w tacro 1 mg BID and f/u tacro level before AM dose  - holding mycophenalate  - consulted transplant nephrology   -c/w valacyclovir 500 mg q12 for ppx  -c/w bactrim DS ppx

## 2025-03-14 NOTE — PROGRESS NOTE ADULT - PROBLEM SELECTOR PLAN 9
Hypothyroidism on levothyroxine 88 mcg    Plan:  -c/w home levothyroxine 88 mcg  -f/u TSH and free T4

## 2025-03-14 NOTE — CONSULT NOTE ADULT - SUBJECTIVE AND OBJECTIVE BOX
[facility] DIVISION OF KIDNEY DISEASES AND HYPERTENSION -- [phone]  -- INITIAL CONSULT NOTE  --------------------------------------------------------------------------------  HPI: 69M with a PMH of DM, HTN, HLD, ESRD on HD s/p LKRT 2012 (from brother), hypothyroidism, recently diagnosed DLBCL on RCHOP who presented from rehab s/p fall and found to be bacteremic. Transplant nephrology consulted for immunosuppression management.     Pt seen and examined. Pt is at rehab, was due to be discharged home from rehab on 3/15 but now admitted to [facility]. Pt did not appear in distress. Pt notes he was sent into hospital after a fall, he does not recall the fall. Pt unaware of recent n/v/f/c/sob. Pt unaware of his current immunosuppression regimen. Per chart review, pt follows with Dr. King ([place]) and baseline SCr ~0.8.     PAST HISTORY  --------------------------------------------------------------------------------  PAST MEDICAL & SURGICAL HISTORY:  Diabetes Mellitus Type II  Insulin pump (2010)  GERD (gastroesophageal reflux disease)  Depression  Hypothyroidism  Hyperlipidemia  Kidney transplanted  2012  Hypertension  ANGELIKA (obstructive sleep apnea)  Peripheral neuropathy  Shoulder fracture  Left.  Hypothyroidism  DLBCL (diffuse large B cell lymphoma)  Infectious disease  Hypothyroidism  History of colonoscopy  S/P cholecystectomy  Retroperitoneal fibrosis  s/p surgery  AV fistula  Right arm  S/P right cataract extraction  S/P left cataract extraction  H/O unilateral nephrectomy  Left    FAMILY HISTORY:  MI (myocardial infarction)  Family history of obesity (Sibling)    PAST SOCIAL HISTORY: At Rehab     ALLERGIES & MEDICATIONS  --------------------------------------------------------------------------------  Allergies  No Known Allergies    Intolerances    Standing Inpatient Medications  aspirin enteric coated 81 milliGRAM(s) Oral daily  buPROPion XL (24-Hour) . 300 milliGRAM(s) Oral daily  cloNIDine 0.1 milliGRAM(s) Oral three times a day  dextrose 5%. 1000 milliLiter(s) IV Continuous <Continuous>  dextrose 5%. 1000 milliLiter(s) IV Continuous <Continuous>  dextrose 50% Injectable 25 Gram(s) IV Push once  dextrose 50% Injectable 12.5 Gram(s) IV Push once  dextrose 50% Injectable 25 Gram(s) IV Push once  ertapenem  IVPB 1000 milliGRAM(s) IV Intermittent every 24 hours  escitalopram 10 milliGRAM(s) Oral daily  glucagon  Injectable 1 milliGRAM(s) IntraMuscular once  heparin   Injectable 5000 Unit(s) SubCutaneous every 8 hours  insulin glargine Injectable (LANTUS) 5 Unit(s) SubCutaneous every morning  insulin glargine Injectable (LANTUS) 10 Unit(s) SubCutaneous at bedtime  insulin lispro (ADMELOG) corrective regimen sliding scale   SubCutaneous three times a day before meals  insulin lispro (ADMELOG) corrective regimen sliding scale   SubCutaneous at bedtime  iron sucrose IVPB 200 milliGRAM(s) IV Intermittent every 24 hours  lactated ringers. 1000 milliLiter(s) IV Continuous <Continuous>  levothyroxine 88 MICROGram(s) Oral daily  pantoprazole    Tablet 40 milliGRAM(s) Oral before breakfast  polyethylene glycol 3350 17 Gram(s) Oral two times a day  rosuvastatin 10 milliGRAM(s) Oral at bedtime  senna 2 Tablet(s) Oral at bedtime  tacrolimus 1 milliGRAM(s) Oral two times a day  trimethoprim  160 mG/sulfamethoxazole 800 mG 1 Tablet(s) Oral daily  valACYclovir 500 milliGRAM(s) Oral every 12 hours  vancomycin  IVPB 1000 milliGRAM(s) IV Intermittent once    PRN Inpatient Medications  acetaminophen     Tablet .. 1000 milliGRAM(s) Oral every 6 hours PRN  aluminum hydroxide/magnesium hydroxide/simethicone Suspension 30 milliLiter(s) Oral every 4 hours PRN  dextrose Oral Gel 15 Gram(s) Oral once PRN  melatonin 3 milliGRAM(s) Oral at bedtime PRN  ondansetron Injectable 4 milliGRAM(s) IV Push every 8 hours PRN    REVIEW OF SYSTEMS  --------------------------------------------------------------------------------  Limited ROS, see HPI     VITALS/PHYSICAL EXAM  --------------------------------------------------------------------------------  T(C): 36.8 (03-14-25 @ 04:40), Max: 38.2 (03-13-25 @ 15:25)  HR: 70 (03-14-25 @ 08:57) (70 - 97)  BP: 131/70 (03-14-25 @ 08:57) (131/70 - 154/69)  RR: 18 (03-14-25 @ 04:40) (18 - 20)  SpO2: 98% (03-14-25 @ 08:57) (97% - 100%)  Wt(kg): --  Height (cm): 182.9 (03-13-25 @ 14:32)  Weight (kg): 81.6 (03-13-25 @ 14:32)  BMI (kg/m2): 24.4 (03-13-25 @ 14:32)  BSA (m2): 2.04 (03-13-25 @ 14:32)    03-13-25 @ 07:01  -  03-14-25 @ 07:00  --------------------------------------------------------  IN: 480 mL / OUT: 1 mL / NET: 479 mL    03-14-25 @ 07:01  -  03-14-25 @ 13:53  --------------------------------------------------------  IN: 0 mL / OUT: 200 mL / NET: -200 mL    Physical Exam:  	Gen: NAD  	HEENT: Anicteric  	Pulm: CTA B/L  	CV: S1S2+  	Abd: Soft, +BS                Transplant site: non tender, well healed surgical scar.  	Ext: No LE edema B/L  	Neuro: Awake but intermittently confused  	Skin: Warm and dry              Dialysis access: AVF     LABS/STUDIES  --------------------------------------------------------------------------------              9.5    10.20 >-----------<  285      [03-14-25 @ 07:08]              29.0     133  |  101  |  21  ----------------------------<  151      [03-14-25 @ 07:08]  3.9   |  21  |  0.96        Ca     8.4     [03-14-25 @ 07:08]      Mg     2.0     [03-14-25 @ 07:08]      Phos  2.8     [03-14-25 @ 07:08]    TPro  6.9  /  Alb  3.3  /  TBili  0.6  /  DBili  x   /  AST  25  /  ALT  22  /  AlkPhos  112  [03-13-25 @ 15:26]    Creatinine Trend:  SCr 0.96 [03-14 @ 07:08]  SCr 0.85 [03-13 @ 15:26]  SCr 0.68 [02-27 @ 11:49]    HBsAb Reactive      [12-28-24 @ 18:43]  HBsAg Nonreact      [12-27-24 @ 13:43]  HBcAb Nonreact      [12-28-24 @ 18:43]  HCV 0.25, Nonreact      [12-27-24 @ 13:43]  HIV Nonreact      [12-27-24 @ 13:43]    Free Light Chains: kappa 11.12, lambda 5.08, ratio = 2.19      [12-04 @ 16:06]  Immunofixation Serum: Weak IgM Lambda Band Identified    Reference Range: None Detected      [11-29-24 @ 17:10]  SPEP Interpretation: Duplicate Sample      [12-04-24 @ 16:06]  Immunofixation Urine: Weak Bence Hernandez protein Kappa type      [12-07-24 @ 05:00]  UPEP Interpretation: Weak Beta-Migrating Paraprotein Identified      [12-07-24 @ 05:00]  CMVPCR Log: NotDetec [id number]/mL (01-25 @ 06:37)

## 2025-03-14 NOTE — PATIENT PROFILE ADULT - FUNCTIONAL ASSESSMENT - BASIC MOBILITY 6.
2-calculated by average/Not able to assess (calculate score using [facility] averaging method)

## 2025-03-14 NOTE — PROGRESS NOTE ADULT - ASSESSMENT
69 year old M with a history of renal transplant (2012) on tacrolimus, s/p left nephrectomy, DLBCL s/p C1 R mini CHOP (chemo held 2/2 recent infections), peripheral neuropathy, hypothyroidism, retroperitoneal fibrosis encasing veins, HTN, HLD, GERD, anxiety/depression, ANGELIKA and recent admission at [facility] (1/17 - 2/6/25) for sepsis 2/2 Klebsiella pneumoniae ESBL UTI and AHRF c/f PJP pneumonia (s/p completion of atovaquone and pred course) pw unwitnessed fall, in setting of sepsis likely 2/2 UTI and toxic metabolic encephalopathy, CT A/P w severe proctocolitis and cystitis

## 2025-03-14 NOTE — PHYSICAL THERAPY INITIAL EVALUATION ADULT - NSPTDISCHREC_GEN_A_CORE
Return to [facility]; If pt d/c home, pt would require Home PT and assist/supervision with ALL functional mobility and ADLs./Sub-acute Rehab

## 2025-03-14 NOTE — CONSULT NOTE ADULT - SUBJECTIVE AND OBJECTIVE BOX
ID CONSULTATION-- Quintin Herrmann [phone]    Patient is a 69y old  Male who presents with a chief complaint of Toxic metabolic encephalopathy, sepsis 2/2 UTI (14 Mar 2025 13:52)    HPI:  69 year old M with a history of renal transplant (2012) on tacrolimus, s/p left nephrectomy, DLBCL s/p C1 R mini CHOP (chemo held 2/2 recent infections), peripheral neuropathy, hypothyroidism, retroperitoneal fibrosis encasing veins, HTN, HLD, GERD, anxiety/depression, ANGELIKA and recent admission at [facility] (1/17 - 2/6/25) for sepsis 2/2 Klebsiella pneumoniae ESBL UTI and AHRF c/f PJP pneumonia (s/p completion of atovaquone and pred course) presenting after unwitnessed fall at his rehab facility.     On assessment patient oriented to self and place but incoherent and verbally aggressive w tangential thoughts, refusing exam and assessment and unable to answer history questions. Details regarding the fall are limited as collateral information limited. Attempted to call patient's wife who is the emergency contact but was not able to reach. Patient's son was contacted who reports his father "acts this way" when he has infections but was unsure about preceding events.     On arrival febrile to 100.7 rectally, /65, HR 88, RR 18, Sat 100% on RA. RVP neg. UA w mod leuk esterase, neg nitrite, 0-2 wbc. CT head/ c spine neg for acute territorial infarct, hematoma or mass effect. No acute fracture or subluxation of the cervical spine. CT A/P with w severe proctocolitis and cystitis. Received Vanc and zosyn x1, 1 L LR bolus,    (13 Mar 2025 20:22)      PAST MEDICAL & SURGICAL HISTORY:  Diabetes Mellitus Type II  Insulin pump (2010)      GERD (gastroesophageal reflux disease)      Depression      Hypothyroidism      Hyperlipidemia      Kidney transplanted  2012      Hypertension      ANGELIKA (obstructive sleep apnea)      Peripheral neuropathy      Shoulder fracture  Left.      Hypothyroidism      History of renal transplant      DLBCL (diffuse large B cell lymphoma)      Infectious disease      Type 2 diabetes mellitus      Hypothyroidism      Transplanted kidney      History of colonoscopy      S/P kidney transplant  2012      S/P cholecystectomy      Retroperitoneal fibrosis  s/p surgery      AV fistula  Right arm      S/P right cataract extraction      S/P left cataract extraction      H/O unilateral nephrectomy  Left          SOCIAL:   with children  currently residing in a rehab facility    FAMILY HISTORY:  MI (myocardial infarction)    Family history of obesity (Sibling)      REVIEW OF SYSTEMS  General:	Denies any malaise fatigue or chills. Fevers absent    Skin:No rash  	  Ophthalmologic:Denies any visual complaints,discharge redness or photophobia  	  ENMT:No nasal discharge,headache,sinus congestion or throat pain.No dental complaints    Respiratory and Thorax:No cough,sputum or chest pain.Denies shortness of breath  	  Cardiovascular:	No chest pain,palpitaions or dizziness    Gastrointestinal:	NO nausea,abdominal pain or diarrhea.    Genitourinary:	No dysuria,frequency. No flank pain    Musculoskeletal:	No joint swelling or pain.No weakness    Neurological:No confusion,diziness.No extremity weakness.No bladder or bowel incontinence	    Psychiatric:No delusions or hallucinations	    Hematology/Lymphatics:	No LN swelling.No gum bleeding     Endocrine:	No recent weight gain or loss.No abnormal heat/cold intolerance    Allergic/Immunologic:	No hives or rash   Allergies    No Known Allergies    Intolerances        ANTIMICROBIALS:    ertapenem  IVPB 1000 milliGRAM(s) IV Intermittent every 24 hours  trimethoprim  160 mG/sulfamethoxazole 800 mG 1 Tablet(s) Oral daily  valACYclovir 500 milliGRAM(s) Oral every 12 hours  vancomycin  IVPB 1000 milliGRAM(s) IV Intermittent once      Vital Signs Last 24 Hrs  T(C): 36.8 (14 Mar 2025 04:40), Max: 38.2 (13 Mar 2025 15:25)  T(F): 98.2 (14 Mar 2025 04:40), Max: 100.7 (13 Mar 2025 15:25)  HR: 70 (14 Mar 2025 08:57) (70 - 97)  BP: 131/70 (14 Mar 2025 08:57) (131/70 - 154/69)  BP(mean): --  RR: 18 (14 Mar 2025 04:40) (18 - 20)  SpO2: 98% (14 Mar 2025 08:57) (97% - 100%)    Parameters below as of 14 Mar 2025 08:57  Patient On (Oxygen Delivery Method): room air        PHYSICAL EXAM:Pleasant patient in no acute distress.      Constitutional:Comfortable.Awake and alert  No cachexia     Eyes:PERRL EOMI.NO discharge or conjunctival injection    ENMT:No sinus tenderness.No thrush.No pharyngeal exudate or erythema.Fair dental hygiene    Neck:Supple,No LN,no JVD      Respiratory:Good air entry bilaterally,CTA    Cardiovascular:S1 S2 wnl, No murmurs,rub or gallops    Gastrointestinal:Soft BS(+) no tenderness no masses ,No rebound or guarding    Genitourinary:No CVA tendereness     Rectal:    Extremities:No cyanosis,clubbing or edema.    Vascular:peripheral pulses felt    Neurological:AAO X 3,No grossly focal deficits    Skin:No rash     Lymph Nodes:No palpable LNs    Musculoskeletal:No joint swelling or LOM    Psychiatric:Affect normal.                              9.5    10.20 )-----------( 285      ( 14 Mar 2025 07:08 )             29.0       03-14    133[L]  |  101  |  21  ----------------------------<  151[H]  3.9   |  21[L]  |  0.96    Ca    8.4      14 Mar 2025 07:08  Phos  2.8     03-14  Mg     2.0     03-14    TPro  6.9  /  Alb  3.3  /  TBili  0.6  /  DBili  x   /  AST  25  /  ALT  22  /  AlkPhos  112  03-13      RECENT CULTURES:  03-13 @ 15:06  Blood Blood-Peripheral  --  --  --    Growth in aerobic bottle: Gram Positive Cocci in Clusters  --  03-13 @ 15:01  Blood Blood-Peripheral  Blood Culture PCR  Blood Culture PCR  PCR    Growth in aerobic bottle: Gram Positive Cocci in Clusters  Direct identification is available within approximately 3-5  hours either by Blood Panel Multiplexed PCR or Direct  MALDI-TOF. Details: [email or web address]  --  MRSA/MSSA PCR (03.13.25 @ 16:55)    MRSA PCR Result.: NotDetec: The results of this test should be interpreted with consideration of  clinical context.  Not Detected result indicates the absence of organisms or that the number  of organisms is below the assay limit of detection.  Detected result indicates the presence of organism nucleic acid.  Indeterminate result may indicate the presence of amplification  inhibitors in specimen; presence or absence of organisms cannot be  determined. Consider collecting new specimen if further testing is still  needed.  This qualitative PCR assay is FDA-approved, and its performance was  established by [facility] [employer], [place], [place].   Staph aureus PCR Result: NotDetec    Urinalysis + Microscopic Examination (03.13.25 @ 16:08)    pH Urine: 6.0   Urine Appearance: Cloudy   Color: Dark Yellow   Specific Gravity: 1.025   Protein, Urine: 30 mg/dL   Glucose Qualitative, Urine: 500 mg/dL   Ketone - Urine: Negative mg/dL   Blood, Urine: Negative   Bilirubin: Small   Urobilinogen: 1.0 mg/dL   Leukocyte Esterase Concentration: Moderate   Nitrite: Negative   White Blood Cell - Urine: 0-2 /HPF   Red Blood Cell - Urine: None Seen /HPF        RADIOLOGY:  < from: CT Abdomen and Pelvis w/ IV Cont (03.13.25 @ 17:26) >  IMPRESSION:  Diffuse rectal and sigmoid wall likely due to a severe proctocolitis.    Mild bladder wall thickening with adjacent fat stranding, likely due to   cystitis.    Low-attenuation lesions within the spleen and central liver, decreased in   size from 12/9/2024 compatible positive response totreatment.    Unchanged retroperitoneal soft tissue encasing the aorta and IVC, which   could be due to post transplant lymphoproliferative disease given the   history of prior kidney transplant or retroperitoneal fibrosis.    Persistent lucency and heterogeneity within the sacrum, which could be   due to the patient's lymphoma, though ostium colitis could have a similar   appearance in the appropriate clinical setting.    --- End of Report ---    < end of copied text >      IMPRESSION:    Rx:   ID CONSULTATION-- Quintin Herrmann [phone]    Patient is a 69y old  Male who presents with a chief complaint of Toxic metabolic encephalopathy, sepsis 2/2 UTI (14 Mar 2025 13:52)    HPI:  69 year old M with a history of renal transplant (2012) on tacrolimus, s/p left nephrectomy, DLBCL s/p C1 R mini CHOP (chemo held 2/2 recent infections), peripheral neuropathy, hypothyroidism, retroperitoneal fibrosis encasing veins, HTN, HLD, GERD, anxiety/depression, ANGELIKA and recent admission at [facility] (1/17 - 2/6/25) for sepsis 2/2 Klebsiella pneumoniae ESBL UTI and AHRF c/f PJP pneumonia (s/p completion of atovaquone and pred course) presenting after unwitnessed fall at his rehab facility.     On assessment patient oriented to self and place but incoherent and verbally aggressive w tangential thoughts, refusing exam and assessment and unable to answer history questions. Details regarding the fall are limited as collateral information limited. Attempted to call patient's wife who is the emergency contact but was not able to reach. Patient's son was contacted who reports his father "acts this way" when he has infections but was unsure about preceding events.     On arrival febrile to 100.7 rectally, /65, HR 88, RR 18, Sat 100% on RA. RVP neg. UA w mod leuk esterase, neg nitrite, 0-2 wbc. CT head/ c spine neg for acute territorial infarct, hematoma or mass effect. No acute fracture or subluxation of the cervical spine. CT A/P with w severe proctocolitis and cystitis. Received Vanc and zosyn x1, 1 L LR bolus,    (13 Mar 2025 20:22)      PAST MEDICAL & SURGICAL HISTORY:  Diabetes Mellitus Type II  Insulin pump (2010)      GERD (gastroesophageal reflux disease)      Depression      Hypothyroidism      Hyperlipidemia      Kidney transplanted  2012      Hypertension      ANGELIKA (obstructive sleep apnea)      Peripheral neuropathy      Shoulder fracture  Left.      Hypothyroidism      History of renal transplant      DLBCL (diffuse large B cell lymphoma)      Infectious disease      Type 2 diabetes mellitus      Hypothyroidism      Transplanted kidney      History of colonoscopy      S/P kidney transplant  2012      S/P cholecystectomy      Retroperitoneal fibrosis  s/p surgery      AV fistula  Right arm      S/P right cataract extraction      S/P left cataract extraction      H/O unilateral nephrectomy  Left          SOCIAL:   with children  currently residing in a rehab facility    FAMILY HISTORY:  MI (myocardial infarction)    Family history of obesity (Sibling)      REVIEW OF SYSTEMS  General:	Denies any malaise fatigue or chills. Fevers absent    Skin:No rash  	  Ophthalmologic:Denies any visual complaints,discharge redness or photophobia  	  ENMT:No nasal discharge,headache,sinus congestion or throat pain.No dental complaints    Respiratory and Thorax:No cough,sputum or chest pain.Denies shortness of breath  	  Cardiovascular:	No chest pain,palpitaions or dizziness    Gastrointestinal:	NO nausea,abdominal pain or diarrhea.    Genitourinary:	No dysuria,frequency. No flank pain    Musculoskeletal:	No joint swelling or pain.No weakness    Neurological:No confusion,diziness.No extremity weakness.No bladder or bowel incontinence	    Psychiatric:No delusions or hallucinations	    Hematology/Lymphatics:	No LN swelling.No gum bleeding     Endocrine:	No recent weight gain or loss.No abnormal heat/cold intolerance    Allergic/Immunologic:	No hives or rash   Allergies    No Known Allergies    Intolerances        ANTIMICROBIALS:    ertapenem  IVPB 1000 milliGRAM(s) IV Intermittent every 24 hours  trimethoprim  160 mG/sulfamethoxazole 800 mG 1 Tablet(s) Oral daily  valACYclovir 500 milliGRAM(s) Oral every 12 hours  vancomycin  IVPB 1000 milliGRAM(s) IV Intermittent once      Vital Signs Last 24 Hrs  T(C): 36.8 (14 Mar 2025 04:40), Max: 38.2 (13 Mar 2025 15:25)  T(F): 98.2 (14 Mar 2025 04:40), Max: 100.7 (13 Mar 2025 15:25)  HR: 70 (14 Mar 2025 08:57) (70 - 97)  BP: 131/70 (14 Mar 2025 08:57) (131/70 - 154/69)  BP(mean): --  RR: 18 (14 Mar 2025 04:40) (18 - 20)  SpO2: 98% (14 Mar 2025 08:57) (97% - 100%)    Parameters below as of 14 Mar 2025 08:57  Patient On (Oxygen Delivery Method): room air        PHYSICAL EXAM:Pleasant patient in no acute distress.      Constitutional:Comfortable.Awake and alert  No cachexia     Eyes:PERRL EOMI.NO discharge or conjunctival injection    ENMT:No sinus tenderness.No thrush.No pharyngeal exudate or erythema.Fair dental hygiene    Neck:Supple,No LN,no JVD      Respiratory:Good air entry bilaterally,CTA    Cardiovascular:S1 S2 wnl, No murmurs,rub or gallops    Gastrointestinal:Soft BS(+) no tenderness no masses ,No rebound or guarding  OLTx scar well healed, aDDITIONAL VERTICAL MIDLINE SCAR AND VENTRAL HERNIA    Genitourinary:No CVA tendereness     Rectal:    Extremities:No cyanosis,clubbing or edema.    Vascular:peripheral pulses felt    Neurological:AAO X 3,No grossly focal deficits    Skin:No rash     Lymph Nodes:No palpable LNs    Musculoskeletal:No joint swelling or LOM    Psychiatric:Affect normal.                              9.5    10.20 )-----------( 285      ( 14 Mar 2025 07:08 )             29.0       03-14    133[L]  |  101  |  21  ----------------------------<  151[H]  3.9   |  21[L]  |  0.96    Ca    8.4      14 Mar 2025 07:08  Phos  2.8     03-14  Mg     2.0     03-14    TPro  6.9  /  Alb  3.3  /  TBili  0.6  /  DBili  x   /  AST  25  /  ALT  22  /  AlkPhos  112  03-13      RECENT CULTURES:  03-13 @ 15:06  Blood Blood-Peripheral  --  --  --    Growth in aerobic bottle: Gram Positive Cocci in Clusters  --  03-13 @ 15:01  Blood Blood-Peripheral  Blood Culture PCR  Blood Culture PCR  PCR    Growth in aerobic bottle: Gram Positive Cocci in Clusters  Direct identification is available within approximately 3-5  hours either by Blood Panel Multiplexed PCR or Direct  MALDI-TOF. Details: [email or web address]  --  MRSA/MSSA PCR (03.13.25 @ 16:55)    MRSA PCR Result.: [facility]: The results of this test should be interpreted with consideration of  clinical context.  Not Detected result indicates the absence of organisms or that the number  of organisms is below the assay limit of detection.  Detected result indicates the presence of organism nucleic acid.  Indeterminate result may indicate the presence of amplification  inhibitors in specimen; presence or absence of organisms cannot be  determined. Consider collecting new specimen if further testing is still  needed.  This qualitative PCR assay is FDA-approved, and its performance was  established by [facility] [employer], [place], [place].   Staph aureus PCR Result: [facility]    Urinalysis + Microscopic Examination (03.13.25 @ 16:08)    pH Urine: 6.0   Urine Appearance: Cloudy   Color: Dark Yellow   Specific Gravity: 1.025   Protein, Urine: 30 mg/dL   Glucose Qualitative, Urine: 500 mg/dL   Ketone - Urine: Negative mg/dL   Blood, Urine: Negative   Bilirubin: Small   Urobilinogen: 1.0 mg/dL   Leukocyte Esterase Concentration: Moderate   Nitrite: Negative   White Blood Cell - Urine: 0-2 /HPF   Red Blood Cell - Urine: None Seen /HPF        RADIOLOGY:  < from: CT Abdomen and Pelvis w/ IV Cont (03.13.25 @ 17:26) >  IMPRESSION:  Diffuse rectal and sigmoid wall likely due to a severe proctocolitis.    Mild bladder wall thickening with adjacent fat stranding, likely due to   cystitis.    Low-attenuation lesions within the spleen and central liver, decreased in   size from 12/9/2024 compatible positive response totreatment.    Unchanged retroperitoneal soft tissue encasing the aorta and IVC, which   could be due to post transplant lymphoproliferative disease given the   history of prior kidney transplant or retroperitoneal fibrosis.    Persistent lucency and heterogeneity within the sacrum, which could be   due to the patient's lymphoma, though ostium colitis could have a similar   appearance in the appropriate clinical setting.    --- End of Report ---    < end of copied text >      IMPRESSION:    Rx:

## 2025-03-14 NOTE — PROGRESS NOTE ADULT - PROBLEM SELECTOR PLAN 8
Hx of Type 2 DM (A1c 8.4% in Jan 2025) and steroid induced hyperglycemia during PJP tx course. Per rehab records, pt is on 20 units glargine in PM and 10 units glargine in AM and Admelog 24/18/14 premeals    Plan:  - will dose reduce by 50% to 10 units glargine in PM and 5 units in AM + low dose ISS in setting of infection, consider moderate dose ISS if poor control  - monitor FS TIDAC and bedtime  - f/u HbA1c  - consistent carb diet

## 2025-03-14 NOTE — PHYSICAL THERAPY INITIAL EVALUATION ADULT - NSWHEELCHAIREQUIP_GEN_A_PT
Pt requires a polyfly wheelchair due to deficits in strength, balance, and inability to perform short ambulatory transfers, the use of a manual wheelchair will significantly improve the beneficiary’s ability to participate in MRADLs.

## 2025-03-14 NOTE — PHYSICAL THERAPY INITIAL EVALUATION ADULT - IMPAIRED TRANSFERS: SIT/STAND, REHAB EVAL
impaired balance/cognition/impaired coordination/narrow base of support/impaired postural control/decreased strength

## 2025-03-14 NOTE — PHYSICAL THERAPY INITIAL EVALUATION ADULT - PERTINENT HX OF CURRENT PROBLEM, REHAB EVAL
Pt is a 69 year old M with a history of renal transplant (2012) on tacrolimus, s/p left nephrectomy, DLBCL s/p C1 R mini CHOP (chemo held 2/2 recent infections), peripheral neuropathy, hypothyroidism, retroperitoneal fibrosis encasing veins, HTN, HLD, GERD, anxiety/depression, ANGELIKA and recent admission at [facility] (1/17 - 2/6/25) for sepsis 2/2 Klebsiella pneumoniae ESBL UTI and AHRF c/f PJP pneumonia (s/p completion of atovaquone and pred course) presenting after unwitnessed fall at his rehab facility.   CXR: Left PICC tip terminates in the SVC. Limited field-of-view of chest x-ray makes complete evaluation of the excluded thoracic structures difficult.  CT BRAIN: No CT evidence for acute territorial infarct, hematoma or mass effect.  CT CERVICAL SPINE: No acute fracture or subluxation of the cervical spine.  CT A/P: Diffuse rectal and sigmoid wall likely due to a severe proctocolitis. Mild bladder wall thickening with adjacent fat stranding, likely due to cystitis. Low-attenuation lesions within the spleen and central liver, decreased in size from 12/9/2024 compatible positive response to treatment. Unchanged retroperitoneal soft tissue encasing the aorta and IVC, which could be due to post transplant lymphoproliferative disease given the history of prior kidney transplant or retroperitoneal fibrosis. Persistent lucency and heterogeneity within the sacrum, which could be due to the patient's lymphoma, though ostium colitis could have a similar appearance in the appropriate clinical setting.

## 2025-03-14 NOTE — PROGRESS NOTE ADULT - SUBJECTIVE AND OBJECTIVE BOX
Andre Reyes, M.D.  Office: [phone]  Available thru [employer] Teams     Patient is a 69y old  Male who presents with a chief complaint of Toxic metabolic encephalopathy, sepsis 2/2 UTI (13 Mar 2025 20:22)          SUBJECTIVE / OVERNIGHT EVENTS:    No acute overnight events.    ROS: ( - ) Fever, ( - )Chills,  ( - )Nausea/Vomiting, ( - ) Cough, ( - )Shortness of breath, ( - )Chest Pain    MEDICATIONS  (STANDING):  aspirin enteric coated 81 milliGRAM(s) Oral daily  buPROPion XL (24-Hour) . 300 milliGRAM(s) Oral daily  cloNIDine 0.1 milliGRAM(s) Oral three times a day  dextrose 5%. 1000 milliLiter(s) (100 mL/Hr) IV Continuous <Continuous>  dextrose 5%. 1000 milliLiter(s) (50 mL/Hr) IV Continuous <Continuous>  dextrose 50% Injectable 25 Gram(s) IV Push once  dextrose 50% Injectable 12.5 Gram(s) IV Push once  dextrose 50% Injectable 25 Gram(s) IV Push once  ertapenem  IVPB 1000 milliGRAM(s) IV Intermittent every 24 hours  escitalopram 10 milliGRAM(s) Oral daily  glucagon  Injectable 1 milliGRAM(s) IntraMuscular once  heparin   Injectable 5000 Unit(s) SubCutaneous every 8 hours  insulin glargine Injectable (LANTUS) 5 Unit(s) SubCutaneous every morning  insulin glargine Injectable (LANTUS) 10 Unit(s) SubCutaneous at bedtime  insulin lispro (ADMELOG) corrective regimen sliding scale   SubCutaneous three times a day before meals  insulin lispro (ADMELOG) corrective regimen sliding scale   SubCutaneous at bedtime  lactated ringers. 1000 milliLiter(s) (75 mL/Hr) IV Continuous <Continuous>  levothyroxine 88 MICROGram(s) Oral daily  pantoprazole    Tablet 40 milliGRAM(s) Oral before breakfast  polyethylene glycol 3350 17 Gram(s) Oral two times a day  rosuvastatin 10 milliGRAM(s) Oral at bedtime  senna 2 Tablet(s) Oral at bedtime  tacrolimus 1 milliGRAM(s) Oral two times a day  trimethoprim  160 mG/sulfamethoxazole 800 mG 1 Tablet(s) Oral daily  valACYclovir 500 milliGRAM(s) Oral every 12 hours    MEDICATIONS  (PRN):  aluminum hydroxide/magnesium hydroxide/simethicone Suspension 30 milliLiter(s) Oral every 4 hours PRN Dyspepsia  dextrose Oral Gel 15 Gram(s) Oral once PRN Blood Glucose LESS THAN 70 milliGRAM(s)/deciliter  melatonin 3 milliGRAM(s) Oral at bedtime PRN Insomnia  ondansetron Injectable 4 milliGRAM(s) IV Push every 8 hours PRN Nausea and/or Vomiting          T(C): 36.8 (03-14 @ 04:40), Max: 38.2 (03-13 @ 15:25)   HR: 70   BP: 131/70   RR: 18   SpO2: 98%    PHYSICAL EXAM:    CONSTITUTIONAL: NAD, well-developed, well-groomed  EYES: PERRLA; conjunctiva and sclera clear  ENMT: Moist oral mucosa, no pharyngeal injection or exudates; normal dentition  RESPIRATORY: Normal respiratory effort; lungs are clear to auscultation bilaterally  CARDIOVASCULAR: Regular rate and rhythm, normal S1 and S2, no murmur/rub/gallop; No lower extremity edema; Peripheral pulses are 2+ bilaterally  ABDOMEN: Nontender to palpation, normoactive bowel sounds, no rebound/guarding; No hepatosplenomegaly  MUSCULOSKELETAL:  no clubbing or cyanosis of digits; no joint swelling or tenderness to palpation  PSYCH: A+O to person, place, and time; affect appropriate  NEUROLOGY: CN 2-12 are intact and symmetric; no gross sensory deficits       LABS:                        9.5    10.20 )-----------( 285      ( 14 Mar 2025 07:08 )             29.0      03-14    133[L]  |  101  |  21  ----------------------------<  151[H]  3.9   |  21[L]  |  0.96    Ca    8.4      14 Mar 2025 07:08  Phos  2.8     03-14  Mg     2.0     03-14    TPro  6.9  /  Alb  3.3  /  TBili  0.6  /  DBili  x   /  AST  25  /  ALT  22  /  AlkPhos  112  03-13       CAPILLARY BLOOD GLUCOSE      POCT Blood Glucose.: 134 mg/dL (14 Mar 2025 08:09)  POCT Blood Glucose.: 248 mg/dL (14 Mar 2025 00:26)  POCT Blood Glucose.: 220 mg/dL (13 Mar 2025 21:48)  POCT Blood Glucose.: 153 mg/dL (13 Mar 2025 14:50)      RADIOLOGY & ADDITIONAL TESTS:    Imaging Personally Reviewed:  Consultant(s) Notes Reviewed:    Care Discussed with Consultants/Other Providers:   Andre Reyes, M.D.  Office: [phone]  Available thru [employer] Teams     Patient is a 69y old  Male who presents with a chief complaint of Toxic metabolic encephalopathy, sepsis 2/2 UTI (13 Mar 2025 20:22)          SUBJECTIVE / OVERNIGHT EVENTS:    No acute overnight events.  No complaints    ROS: ( - ) Fever, ( - )Chills,  ( - )Nausea/Vomiting, ( - ) Cough, ( - )Shortness of breath, ( - )Chest Pain    MEDICATIONS  (STANDING):  aspirin enteric coated 81 milliGRAM(s) Oral daily  buPROPion XL (24-Hour) . 300 milliGRAM(s) Oral daily  cloNIDine 0.1 milliGRAM(s) Oral three times a day  dextrose 5%. 1000 milliLiter(s) (100 mL/Hr) IV Continuous <Continuous>  dextrose 5%. 1000 milliLiter(s) (50 mL/Hr) IV Continuous <Continuous>  dextrose 50% Injectable 25 Gram(s) IV Push once  dextrose 50% Injectable 12.5 Gram(s) IV Push once  dextrose 50% Injectable 25 Gram(s) IV Push once  ertapenem  IVPB 1000 milliGRAM(s) IV Intermittent every 24 hours  escitalopram 10 milliGRAM(s) Oral daily  glucagon  Injectable 1 milliGRAM(s) IntraMuscular once  heparin   Injectable 5000 Unit(s) SubCutaneous every 8 hours  insulin glargine Injectable (LANTUS) 5 Unit(s) SubCutaneous every morning  insulin glargine Injectable (LANTUS) 10 Unit(s) SubCutaneous at bedtime  insulin lispro (ADMELOG) corrective regimen sliding scale   SubCutaneous three times a day before meals  insulin lispro (ADMELOG) corrective regimen sliding scale   SubCutaneous at bedtime  lactated ringers. 1000 milliLiter(s) (75 mL/Hr) IV Continuous <Continuous>  levothyroxine 88 MICROGram(s) Oral daily  pantoprazole    Tablet 40 milliGRAM(s) Oral before breakfast  polyethylene glycol 3350 17 Gram(s) Oral two times a day  rosuvastatin 10 milliGRAM(s) Oral at bedtime  senna 2 Tablet(s) Oral at bedtime  tacrolimus 1 milliGRAM(s) Oral two times a day  trimethoprim  160 mG/sulfamethoxazole 800 mG 1 Tablet(s) Oral daily  valACYclovir 500 milliGRAM(s) Oral every 12 hours    MEDICATIONS  (PRN):  aluminum hydroxide/magnesium hydroxide/simethicone Suspension 30 milliLiter(s) Oral every 4 hours PRN Dyspepsia  dextrose Oral Gel 15 Gram(s) Oral once PRN Blood Glucose LESS THAN 70 milliGRAM(s)/deciliter  melatonin 3 milliGRAM(s) Oral at bedtime PRN Insomnia  ondansetron Injectable 4 milliGRAM(s) IV Push every 8 hours PRN Nausea and/or Vomiting          T(C): 36.8 (03-14 @ 04:40), Max: 38.2 (03-13 @ 15:25)   HR: 70   BP: 131/70   RR: 18   SpO2: 98%    PHYSICAL EXAM:    CONSTITUTIONAL: NAD, well-developed, well-groomed  EYES: PERRLA; conjunctiva and sclera clear  ENMT: Moist oral mucosa, no pharyngeal injection or exudates; normal dentition  RESPIRATORY: Normal respiratory effort; lungs are clear to auscultation bilaterally  CARDIOVASCULAR: Regular rate and rhythm, normal S1 and S2, no murmur/rub/gallop; No lower extremity edema; Peripheral pulses are 2+ bilaterally  ABDOMEN: Nontender to palpation, normoactive bowel sounds, no rebound/guarding; No hepatosplenomegaly  MUSCULOSKELETAL:  no clubbing or cyanosis of digits; no joint swelling or tenderness to palpation  PSYCH: A+O to person, place, and time; affect appropriate  NEUROLOGY: CN 2-12 are intact and symmetric; no gross sensory deficits       LABS:                        9.5    10.20 )-----------( 285      ( 14 Mar 2025 07:08 )             29.0      03-14    133[L]  |  101  |  21  ----------------------------<  151[H]  3.9   |  21[L]  |  0.96    Ca    8.4      14 Mar 2025 07:08  Phos  2.8     03-14  Mg     2.0     03-14    TPro  6.9  /  Alb  3.3  /  TBili  0.6  /  DBili  x   /  AST  25  /  ALT  22  /  AlkPhos  112  03-13       CAPILLARY BLOOD GLUCOSE      POCT Blood Glucose.: 134 mg/dL (14 Mar 2025 08:09)  POCT Blood Glucose.: 248 mg/dL (14 Mar 2025 00:26)  POCT Blood Glucose.: 220 mg/dL (13 Mar 2025 21:48)  POCT Blood Glucose.: 153 mg/dL (13 Mar 2025 14:50)      RADIOLOGY & ADDITIONAL TESTS:    Imaging Personally Reviewed:  Consultant(s) Notes Reviewed:    Care Discussed with Consultants/Other Providers:

## 2025-03-14 NOTE — PROVIDER CONTACT NOTE (CRITICAL VALUE NOTIFICATION) - SITUATION
BCX from 3/13  Growth in aerobic bottle, gram + cocci in clusters BCX from 3/13  Growth in aerobic bottle, gram + cocci in clusters (both sets)

## 2025-03-14 NOTE — PROVIDER CONTACT NOTE (CRITICAL VALUE NOTIFICATION) - ACTION/TREATMENT ORDERED:
EBEN Jacinto notified. Pt on ertrapenum at this time. Will continue to monitor. EBEN Jacinto notified. Pt on ertrapenum at this time. Mark ordered. Will continue to monitor.

## 2025-03-14 NOTE — PATIENT PROFILE ADULT - FALL HARM RISK - HARM RISK INTERVENTIONS
Assistance with ambulation/Assistance OOB with selected safe patient handling equipment/Communicate Risk of Fall with Harm to all staff/Discuss with provider need for PT consult/Monitor gait and stability/Reinforce activity limits and safety measures with patient and family/Tailored Fall Risk Interventions/Visual Cue: Yellow wristband and red socks/Bed in lowest position, wheels locked, appropriate side rails in place/Call bell, personal items and telephone in reach/Instruct patient to call for assistance before getting out of bed or chair/Non-slip footwear when patient is out of bed/[place] to call system/Physically safe environment - no spills, clutter or unnecessary equipment/Purposeful Proactive Rounding/Room/bathroom lighting operational, light cord in reach

## 2025-03-14 NOTE — CONSULT NOTE ADULT - ASSESSMENT
69 year old M with a history of renal transplant (2012) on tacrolimus, s/p left nephrectomy, DLBCL s/p C1 R mini CHOP (chemo held 2/2 recent infections), peripheral neuropathy, hypothyroidism, retroperitoneal fibrosis encasing veins, HTN, HLD, GERD, anxiety/depression, ANGELIKA and recent admission at [facility] (1/17 - 2/6/25) for sepsis 2/2 Klebsiella pneumoniae ESBL UTI and AHRF c/f PJP pneumonia (s/p completion of atovaquone and pred course) presenting after unwitnessed fall at his rehab facility.     On assessment patient oriented to self and place but incoherent and verbally aggressive w tangential thoughts, refusing exam and assessment and unable to answer history questions. Details regarding the fall are limited as collateral information limited. Attempted to call patient's wife who is the emergency contact but was not able to reach. Patient's son was contacted who reports his father "acts this way" when he has infections but was unsure about preceding events.     On arrival febrile to 100.7 rectally, /65, HR 88, RR 18, Sat 100% on RA. RVP neg. UA w mod leuk esterase, neg nitrite, 0-2 wbc. CT head/ c spine neg for acute territorial infarct, hematoma or mass effect. No acute fracture or subluxation of the cervical spine. CT A/P with w severe proctocolitis and cystitis. Received Vanc and zosyn x1, 1 L LR bolus,     # renal transplant  # immunosuppression  # history of multiple prior transplant related UTIs  # high grade coag negative staph bacteremia    Would:  Repeat blood cultures x2 sets for evidence of clearing  Would change IV Vancomycin to Daptomycin 8mg/kg/day  Send baseline and weekly CPK  continue Ertapenem pending urine studies  send BK virus serum viral load    # proctocolitis:  Send CMV viral load  Send HSV viral load  Send EBV viral load  IF does not improve will ask GI to evaluate   69 year old M with a history of renal transplant (2012) on tacrolimus, s/p left nephrectomy, DLBCL s/p C1 R mini CHOP (chemo held 2/2 recent infections), peripheral neuropathy, hypothyroidism, retroperitoneal fibrosis encasing veins, HTN, HLD, GERD, anxiety/depression, ANGELIKA and recent admission at [facility] (1/17 - 2/6/25) for sepsis 2/2 Klebsiella pneumoniae ESBL UTI and AHRF c/f PJP pneumonia (s/p completion of atovaquone and pred course) presenting after unwitnessed fall at his rehab facility.     On assessment patient oriented to self and place but incoherent and verbally aggressive w tangential thoughts, refusing exam and assessment and unable to answer history questions. Details regarding the fall are limited as collateral information limited. Attempted to call patient's wife who is the emergency contact but was not able to reach. Patient's son was contacted who reports his father "acts this way" when he has infections but was unsure about preceding events.     On arrival febrile to 100.7 rectally, /65, HR 88, RR 18, Sat 100% on RA. RVP neg. UA w mod leuk esterase, neg nitrite, 0-2 wbc. CT head/ c spine neg for acute territorial infarct, hematoma or mass effect. No acute fracture or subluxation of the cervical spine. CT A/P with w severe proctocolitis and cystitis. Received Vanc and zosyn x1, 1 L LR bolus,     # renal transplant  # immunosuppression  # history of multiple prior transplant related UTIs  # high grade coag negative staph bacteremia    Would:  Repeat blood cultures x2 sets for evidence of clearing uNclear at this point if procurement contaminants or true infection   Would change IV Vancomycin to Daptomycin 8mg/kg/day  Send baseline and weekly CPK  continue Ertapenem pending urine studies  send BK virus serum viral load    # proctocolitis:  Send CMV viral load  Send HSV viral load  Send EBV viral load  IF does not improve will ask GI to evaluate    # recent past treatment for PJP pneumonia presumptive  send fungitell and LDH  breathing comfortably on room air  so unlikely    Quintin Herrmann MD  Can be called via Teams  After 5pm/weekends [phone]

## 2025-03-14 NOTE — PROGRESS NOTE ADULT - PROBLEM SELECTOR PLAN 7
Previous admission in Jan- Feb 2025 for R mini chop cycle 2, but chemo held at the time due to AHRF c/f PJP PNA, completed atovaquone/pred course    Plan:  -c/w bactrim DS daily for PJP ppx dinner

## 2025-03-14 NOTE — PROVIDER CONTACT NOTE (OTHER) - SITUATION
Pt is agitated and refusing vitals and medications. When staff enters room patient begins screaming to get out. Pt is agitated and refusing vitals and medications. When staff enters room patient begins screaming to get out. Pt is screaming, cursing at, and threatening staff upon approaching pt room.

## 2025-03-15 LAB
CULTURE RESULTS: ABNORMAL
GLUCOSE BLDC GLUCOMTR-MCNC: 372 MG/DL — HIGH (ref 70–99)
GLUCOSE BLDC GLUCOMTR-MCNC: 376 MG/DL — HIGH (ref 70–99)
SPECIMEN SOURCE: SIGNIFICANT CHANGE UP

## 2025-03-15 PROCEDURE — 99233 SBSQ HOSP IP/OBS HIGH 50: CPT

## 2025-03-15 RX ORDER — QUETIAPINE FUMARATE 25 MG/1
25 TABLET ORAL AT BEDTIME
Refills: 0 | Status: DISCONTINUED | OUTPATIENT
Start: 2025-03-15 | End: 2025-03-21

## 2025-03-15 RX ORDER — INSULIN GLARGINE-YFGN 100 [IU]/ML
12 INJECTION, SOLUTION SUBCUTANEOUS AT BEDTIME
Refills: 0 | Status: DISCONTINUED | OUTPATIENT
Start: 2025-03-15 | End: 2025-03-18

## 2025-03-15 RX ORDER — OLANZAPINE 10 MG/1
2.5 TABLET ORAL EVERY 6 HOURS
Refills: 0 | Status: DISCONTINUED | OUTPATIENT
Start: 2025-03-15 | End: 2025-03-21

## 2025-03-15 RX ORDER — QUETIAPINE FUMARATE 25 MG/1
25 TABLET ORAL AT BEDTIME
Refills: 0 | Status: DISCONTINUED | OUTPATIENT
Start: 2025-03-15 | End: 2025-03-15

## 2025-03-15 RX ADMIN — INSULIN LISPRO 5: 100 INJECTION, SOLUTION INTRAVENOUS; SUBCUTANEOUS at 17:01

## 2025-03-15 RX ADMIN — TACROLIMUS 1 MILLIGRAM(S): 0.5 CAPSULE ORAL at 10:20

## 2025-03-15 RX ADMIN — HEPARIN SODIUM 5000 UNIT(S): 1000 INJECTION INTRAVENOUS; SUBCUTANEOUS at 11:30

## 2025-03-15 RX ADMIN — INSULIN LISPRO 5: 100 INJECTION, SOLUTION INTRAVENOUS; SUBCUTANEOUS at 10:58

## 2025-03-15 RX ADMIN — Medication 0.1 MILLIGRAM(S): at 10:24

## 2025-03-15 RX ADMIN — BUPROPION HYDROBROMIDE 300 MILLIGRAM(S): 522 TABLET, EXTENDED RELEASE ORAL at 10:24

## 2025-03-15 RX ADMIN — Medication 81 MILLIGRAM(S): at 10:24

## 2025-03-15 RX ADMIN — INSULIN GLARGINE-YFGN 5 UNIT(S): 100 INJECTION, SOLUTION SUBCUTANEOUS at 10:58

## 2025-03-15 RX ADMIN — Medication 500 MILLIGRAM(S): at 17:01

## 2025-03-15 RX ADMIN — ESCITALOPRAM OXALATE 10 MILLIGRAM(S): 20 TABLET ORAL at 10:25

## 2025-03-15 RX ADMIN — IRON SUCROSE 110 MILLIGRAM(S): 20 INJECTION, SOLUTION INTRAVENOUS at 16:06

## 2025-03-15 RX ADMIN — PREDNISONE 5 MILLIGRAM(S): 20 TABLET ORAL at 10:25

## 2025-03-15 RX ADMIN — Medication 1 TABLET(S): at 10:25

## 2025-03-15 RX ADMIN — DAPTOMYCIN 126 MILLIGRAM(S): 500 INJECTION, POWDER, LYOPHILIZED, FOR SOLUTION INTRAVENOUS at 16:05

## 2025-03-15 NOTE — PROVIDER CONTACT NOTE (OTHER) - ASSESSMENT
Unable to assess pt's orientation. Pt is continuously screaming to get out of the room and will not listen and answer questions. Pt refused all medications tacro, ertapenem, clonidine, sq heparin, lantus, seroquel, crestor and senna. Pt refused assessment, fingersticks and vitals.

## 2025-03-15 NOTE — PROGRESS NOTE ADULT - PROBLEM SELECTOR PLAN 7
Previous admission in Jan- Feb 2025 for R mini chop cycle 2, but chemo held at the time due to AHRF c/f PJP PNA, completed atovaquone/pred course    Plan:  -c/w bactrim DS daily for PJP ppx

## 2025-03-15 NOTE — PROGRESS NOTE ADULT - SUBJECTIVE AND OBJECTIVE BOX
Andre Reyes, M.D.  Office: [phone]  Available thru [employer] Teams     Patient is a 69y old  Male who presents with a chief complaint of Toxic metabolic encephalopathy, sepsis 2/2 UTI (14 Mar 2025 14:09)          SUBJECTIVE / OVERNIGHT EVENTS:    No acute overnight events.    ROS: ( - ) Fever, ( - )Chills,  ( - )Nausea/Vomiting, ( - ) Cough, ( - )Shortness of breath, ( - )Chest Pain    MEDICATIONS  (STANDING):  aspirin enteric coated 81 milliGRAM(s) Oral daily  buPROPion XL (24-Hour) . 300 milliGRAM(s) Oral daily  chlorhexidine 2% Cloths 1 Application(s) Topical <User Schedule>  cloNIDine 0.1 milliGRAM(s) Oral three times a day  DAPTOmycin IVPB      DAPTOmycin IVPB 650 milliGRAM(s) IV Intermittent every 24 hours  dextrose 5%. 1000 milliLiter(s) (100 mL/Hr) IV Continuous <Continuous>  dextrose 5%. 1000 milliLiter(s) (50 mL/Hr) IV Continuous <Continuous>  dextrose 50% Injectable 25 Gram(s) IV Push once  dextrose 50% Injectable 12.5 Gram(s) IV Push once  dextrose 50% Injectable 25 Gram(s) IV Push once  ertapenem  IVPB 1000 milliGRAM(s) IV Intermittent every 24 hours  escitalopram 10 milliGRAM(s) Oral daily  glucagon  Injectable 1 milliGRAM(s) IntraMuscular once  heparin   Injectable 5000 Unit(s) SubCutaneous every 8 hours  insulin glargine Injectable (LANTUS) 5 Unit(s) SubCutaneous every morning  insulin glargine Injectable (LANTUS) 10 Unit(s) SubCutaneous at bedtime  insulin lispro (ADMELOG) corrective regimen sliding scale   SubCutaneous three times a day before meals  insulin lispro (ADMELOG) corrective regimen sliding scale   SubCutaneous at bedtime  iron sucrose IVPB 200 milliGRAM(s) IV Intermittent every 24 hours  lactated ringers. 1000 milliLiter(s) (75 mL/Hr) IV Continuous <Continuous>  levothyroxine 88 MICROGram(s) Oral daily  pantoprazole    Tablet 40 milliGRAM(s) Oral before breakfast  polyethylene glycol 3350 17 Gram(s) Oral two times a day  predniSONE   Tablet 5 milliGRAM(s) Oral daily  rosuvastatin 10 milliGRAM(s) Oral at bedtime  senna 2 Tablet(s) Oral at bedtime  tacrolimus 1 milliGRAM(s) Oral two times a day  trimethoprim  160 mG/sulfamethoxazole 800 mG 1 Tablet(s) Oral daily  valACYclovir 500 milliGRAM(s) Oral every 12 hours    MEDICATIONS  (PRN):  acetaminophen     Tablet .. 1000 milliGRAM(s) Oral every 6 hours PRN Temp greater or equal to 38C (100.4F), Mild Pain (1 - 3)  aluminum hydroxide/magnesium hydroxide/simethicone Suspension 30 milliLiter(s) Oral every 4 hours PRN Dyspepsia  dextrose Oral Gel 15 Gram(s) Oral once PRN Blood Glucose LESS THAN 70 milliGRAM(s)/deciliter  melatonin 3 milliGRAM(s) Oral at bedtime PRN Insomnia  ondansetron Injectable 4 milliGRAM(s) IV Push every 8 hours PRN Nausea and/or Vomiting          T(C): 36.6 (03-14 @ 12:20), Max: 36.6 (03-14 @ 12:20)   HR: 70   BP: 152/53   RR: 18   SpO2: 99%    PHYSICAL EXAM:    CONSTITUTIONAL: NAD, well-developed, well-groomed  EYES: PERRLA; conjunctiva and sclera clear  ENMT: Moist oral mucosa, no pharyngeal injection or exudates; normal dentition  RESPIRATORY: Normal respiratory effort; lungs are clear to auscultation bilaterally  CARDIOVASCULAR: Regular rate and rhythm, normal S1 and S2, no murmur/rub/gallop; No lower extremity edema; Peripheral pulses are 2+ bilaterally  ABDOMEN: Nontender to palpation, normoactive bowel sounds, no rebound/guarding; No hepatosplenomegaly  MUSCULOSKELETAL:  no clubbing or cyanosis of digits; no joint swelling or tenderness to palpation  PSYCH: A+O to person, place, and time; affect appropriate  NEUROLOGY: CN 2-12 are intact and symmetric; no gross sensory deficits       LABS:                        9.5    10.20 )-----------( 285      ( 14 Mar 2025 07:08 )             29.0      03-14    133[L]  |  101  |  21  ----------------------------<  151[H]  3.9   |  21[L]  |  0.96    Ca    8.4      14 Mar 2025 07:08  Phos  2.8     03-14  Mg     2.0     03-14    TPro  6.9  /  Alb  3.3  /  TBili  0.6  /  DBili  x   /  AST  25  /  ALT  22  /  AlkPhos  112  03-13       CAPILLARY BLOOD GLUCOSE      POCT Blood Glucose.: 278 mg/dL (14 Mar 2025 16:53)  POCT Blood Glucose.: 258 mg/dL (14 Mar 2025 12:18)      RADIOLOGY & ADDITIONAL TESTS:    Imaging Personally Reviewed:  Consultant(s) Notes Reviewed:    Care Discussed with Consultants/Other Providers:   Andre Reyes, M.D.  Office: [phone]  Available thru [employer] Teams     Patient is a 69y old  Male who presents with a chief complaint of Toxic metabolic encephalopathy, sepsis 2/2 UTI (14 Mar 2025 14:09)          SUBJECTIVE / OVERNIGHT EVENTS:    No acute overnight events.  confuse today  intermittently refusing his meds per RN    ROS: ( - ) Fever, ( - )Chills,  ( - )Nausea/Vomiting, ( - ) Cough, ( - )Shortness of breath, ( - )Chest Pain    MEDICATIONS  (STANDING):  aspirin enteric coated 81 milliGRAM(s) Oral daily  buPROPion XL (24-Hour) . 300 milliGRAM(s) Oral daily  chlorhexidine 2% Cloths 1 Application(s) Topical <User Schedule>  cloNIDine 0.1 milliGRAM(s) Oral three times a day  DAPTOmycin IVPB      DAPTOmycin IVPB 650 milliGRAM(s) IV Intermittent every 24 hours  dextrose 5%. 1000 milliLiter(s) (100 mL/Hr) IV Continuous <Continuous>  dextrose 5%. 1000 milliLiter(s) (50 mL/Hr) IV Continuous <Continuous>  dextrose 50% Injectable 25 Gram(s) IV Push once  dextrose 50% Injectable 12.5 Gram(s) IV Push once  dextrose 50% Injectable 25 Gram(s) IV Push once  ertapenem  IVPB 1000 milliGRAM(s) IV Intermittent every 24 hours  escitalopram 10 milliGRAM(s) Oral daily  glucagon  Injectable 1 milliGRAM(s) IntraMuscular once  heparin   Injectable 5000 Unit(s) SubCutaneous every 8 hours  insulin glargine Injectable (LANTUS) 5 Unit(s) SubCutaneous every morning  insulin glargine Injectable (LANTUS) 10 Unit(s) SubCutaneous at bedtime  insulin lispro (ADMELOG) corrective regimen sliding scale   SubCutaneous three times a day before meals  insulin lispro (ADMELOG) corrective regimen sliding scale   SubCutaneous at bedtime  iron sucrose IVPB 200 milliGRAM(s) IV Intermittent every 24 hours  lactated ringers. 1000 milliLiter(s) (75 mL/Hr) IV Continuous <Continuous>  levothyroxine 88 MICROGram(s) Oral daily  pantoprazole    Tablet 40 milliGRAM(s) Oral before breakfast  polyethylene glycol 3350 17 Gram(s) Oral two times a day  predniSONE   Tablet 5 milliGRAM(s) Oral daily  rosuvastatin 10 milliGRAM(s) Oral at bedtime  senna 2 Tablet(s) Oral at bedtime  tacrolimus 1 milliGRAM(s) Oral two times a day  trimethoprim  160 mG/sulfamethoxazole 800 mG 1 Tablet(s) Oral daily  valACYclovir 500 milliGRAM(s) Oral every 12 hours    MEDICATIONS  (PRN):  acetaminophen     Tablet .. 1000 milliGRAM(s) Oral every 6 hours PRN Temp greater or equal to 38C (100.4F), Mild Pain (1 - 3)  aluminum hydroxide/magnesium hydroxide/simethicone Suspension 30 milliLiter(s) Oral every 4 hours PRN Dyspepsia  dextrose Oral Gel 15 Gram(s) Oral once PRN Blood Glucose LESS THAN 70 milliGRAM(s)/deciliter  melatonin 3 milliGRAM(s) Oral at bedtime PRN Insomnia  ondansetron Injectable 4 milliGRAM(s) IV Push every 8 hours PRN Nausea and/or Vomiting          T(C): 36.6 (03-14 @ 12:20), Max: 36.6 (03-14 @ 12:20)   HR: 70   BP: 152/53   RR: 18   SpO2: 99%    PHYSICAL EXAM:    CONSTITUTIONAL: NAD, well-developed, well-groomed  EYES: PERRLA; conjunctiva and sclera clear  ENMT: Moist oral mucosa, no pharyngeal injection or exudates; normal dentition  RESPIRATORY: Normal respiratory effort; lungs are clear to auscultation bilaterally  CARDIOVASCULAR: Regular rate and rhythm, normal S1 and S2, no murmur/rub/gallop; No lower extremity edema; Peripheral pulses are 2+ bilaterally  ABDOMEN: Nontender to palpation, normoactive bowel sounds, no rebound/guarding; No hepatosplenomegaly  MUSCULOSKELETAL:  no clubbing or cyanosis of digits; no joint swelling or tenderness to palpation  PSYCH: A+O to person, place, and time; affect appropriate  NEUROLOGY: CN 2-12 are intact and symmetric; no gross sensory deficits       LABS:                        9.5    10.20 )-----------( 285      ( 14 Mar 2025 07:08 )             29.0      03-14    133[L]  |  101  |  21  ----------------------------<  151[H]  3.9   |  21[L]  |  0.96    Ca    8.4      14 Mar 2025 07:08  Phos  2.8     03-14  Mg     2.0     03-14    TPro  6.9  /  Alb  3.3  /  TBili  0.6  /  DBili  x   /  AST  25  /  ALT  22  /  AlkPhos  112  03-13       CAPILLARY BLOOD GLUCOSE      POCT Blood Glucose.: 278 mg/dL (14 Mar 2025 16:53)  POCT Blood Glucose.: 258 mg/dL (14 Mar 2025 12:18)      RADIOLOGY & ADDITIONAL TESTS:    Imaging Personally Reviewed:  Consultant(s) Notes Reviewed:    Care Discussed with Consultants/Other Providers:

## 2025-03-15 NOTE — PROVIDER CONTACT NOTE (OTHER) - REASON
Patient here today for EKG test.
Pt agitated and refusing vitals and medications
STAT [facility] order
Pt is refusing medications, fingersticks, vitals and assessment

## 2025-03-15 NOTE — PROVIDER CONTACT NOTE (OTHER) - BACKGROUND
Pt admitted with repeated falls.
Pt admitted with multiple falls, pt on contact isolation precautions for c. diff.
Pt admitted with repeated falls

## 2025-03-15 NOTE — PROGRESS NOTE ADULT - PROBLEM SELECTOR PLAN 1
Staph Epi bacteremia.   Transplant ID eval appreciated --> c/w dapto  TTE --> was unable to r/o endocarditis --> erasto d/c ID about need for BELLA given Coag neg staph

## 2025-03-15 NOTE — PROVIDER CONTACT NOTE (OTHER) - ACTION/TREATMENT ORDERED:
CIRO-Vibha DOMINGUEZ made aware. Try again later.
Provider aware. Hold off on bowel regimen as per provider Myesha. Pt safety maintained.
Provider aware. Provider Taranis to pt bedside. Pt continued refusal.

## 2025-03-15 NOTE — PROGRESS NOTE ADULT - ASSESSMENT
69 year old M with a history of renal transplant (2012) on tacrolimus, s/p left nephrectomy, DLBCL s/p C1 R mini CHOP (chemo held 2/2 recent infections), peripheral neuropathy, hypothyroidism, retroperitoneal fibrosis encasing veins, HTN, HLD, GERD, anxiety/depression, ANGELIKA and recent admission at [facility] (1/17 - 2/6/25) for sepsis 2/2 Klebsiella pneumoniae ESBL UTI and AHRF c/f PJP pneumonia (s/p completion of atovaquone and pred course) pw unwitnessed fall, in setting of sepsis likely 2/2 UTI and toxic metabolic encephalopathy, CT A/P w severe proctocolitis and cystitis

## 2025-03-16 LAB
-  CLINDAMYCIN: SIGNIFICANT CHANGE UP
-  CLINDAMYCIN: SIGNIFICANT CHANGE UP
-  ERYTHROMYCIN: SIGNIFICANT CHANGE UP
-  ERYTHROMYCIN: SIGNIFICANT CHANGE UP
-  GENTAMICIN: SIGNIFICANT CHANGE UP
-  GENTAMICIN: SIGNIFICANT CHANGE UP
-  OXACILLIN: SIGNIFICANT CHANGE UP
-  OXACILLIN: SIGNIFICANT CHANGE UP
-  PENICILLIN: SIGNIFICANT CHANGE UP
-  PENICILLIN: SIGNIFICANT CHANGE UP
-  RIFAMPIN: SIGNIFICANT CHANGE UP
-  RIFAMPIN: SIGNIFICANT CHANGE UP
-  TETRACYCLINE: SIGNIFICANT CHANGE UP
-  TRIMETHOPRIM/SULFAMETHOXAZOLE: SIGNIFICANT CHANGE UP
-  TRIMETHOPRIM/SULFAMETHOXAZOLE: SIGNIFICANT CHANGE UP
-  VANCOMYCIN: SIGNIFICANT CHANGE UP
CULTURE RESULTS: ABNORMAL
CULTURE RESULTS: ABNORMAL
GLUCOSE BLDC GLUCOMTR-MCNC: 276 MG/DL — HIGH (ref 70–99)
GLUCOSE BLDC GLUCOMTR-MCNC: 363 MG/DL — HIGH (ref 70–99)
GLUCOSE BLDC GLUCOMTR-MCNC: 375 MG/DL — HIGH (ref 70–99)
GLUCOSE BLDC GLUCOMTR-MCNC: 379 MG/DL — HIGH (ref 70–99)
GLUCOSE BLDC GLUCOMTR-MCNC: 409 MG/DL — HIGH (ref 70–99)
GLUCOSE BLDC GLUCOMTR-MCNC: 411 MG/DL — HIGH (ref 70–99)
METHOD TYPE: SIGNIFICANT CHANGE UP
METHOD TYPE: SIGNIFICANT CHANGE UP
ORGANISM # SPEC MICROSCOPIC CNT: ABNORMAL
SPECIMEN SOURCE: SIGNIFICANT CHANGE UP
TACROLIMUS SERPL-MCNC: 4 NG/ML — SIGNIFICANT CHANGE UP

## 2025-03-16 PROCEDURE — G0545: CPT

## 2025-03-16 PROCEDURE — 99233 SBSQ HOSP IP/OBS HIGH 50: CPT

## 2025-03-16 PROCEDURE — 99232 SBSQ HOSP IP/OBS MODERATE 35: CPT

## 2025-03-16 RX ORDER — MELATONIN 5 MG
3 TABLET ORAL AT BEDTIME
Refills: 0 | Status: DISCONTINUED | OUTPATIENT
Start: 2025-03-16 | End: 2025-03-21

## 2025-03-16 RX ORDER — INSULIN LISPRO 100 U/ML
INJECTION, SOLUTION INTRAVENOUS; SUBCUTANEOUS AT BEDTIME
Refills: 0 | Status: DISCONTINUED | OUTPATIENT
Start: 2025-03-16 | End: 2025-03-21

## 2025-03-16 RX ORDER — INSULIN LISPRO 100 U/ML
INJECTION, SOLUTION INTRAVENOUS; SUBCUTANEOUS
Refills: 0 | Status: DISCONTINUED | OUTPATIENT
Start: 2025-03-16 | End: 2025-03-21

## 2025-03-16 RX ORDER — INSULIN LISPRO 100 U/ML
2 INJECTION, SOLUTION INTRAVENOUS; SUBCUTANEOUS ONCE
Refills: 0 | Status: COMPLETED | OUTPATIENT
Start: 2025-03-16 | End: 2025-03-16

## 2025-03-16 RX ORDER — INSULIN LISPRO 100 U/ML
5 INJECTION, SOLUTION INTRAVENOUS; SUBCUTANEOUS
Refills: 0 | Status: DISCONTINUED | OUTPATIENT
Start: 2025-03-16 | End: 2025-03-18

## 2025-03-16 RX ORDER — AZITHROMYCIN 250 MG
500 CAPSULE ORAL EVERY 24 HOURS
Refills: 0 | Status: DISCONTINUED | OUTPATIENT
Start: 2025-03-16 | End: 2025-03-18

## 2025-03-16 RX ADMIN — HEPARIN SODIUM 5000 UNIT(S): 1000 INJECTION INTRAVENOUS; SUBCUTANEOUS at 00:12

## 2025-03-16 RX ADMIN — ROSUVASTATIN CALCIUM 10 MILLIGRAM(S): 20 TABLET, FILM COATED ORAL at 21:30

## 2025-03-16 RX ADMIN — HEPARIN SODIUM 5000 UNIT(S): 1000 INJECTION INTRAVENOUS; SUBCUTANEOUS at 12:56

## 2025-03-16 RX ADMIN — HEPARIN SODIUM 5000 UNIT(S): 1000 INJECTION INTRAVENOUS; SUBCUTANEOUS at 06:18

## 2025-03-16 RX ADMIN — TACROLIMUS 1 MILLIGRAM(S): 0.5 CAPSULE ORAL at 20:05

## 2025-03-16 RX ADMIN — INSULIN LISPRO 2 UNIT(S): 100 INJECTION, SOLUTION INTRAVENOUS; SUBCUTANEOUS at 03:11

## 2025-03-16 RX ADMIN — Medication 3 MILLIGRAM(S): at 21:31

## 2025-03-16 RX ADMIN — POLYETHYLENE GLYCOL 3350 17 GRAM(S): 17 POWDER, FOR SOLUTION ORAL at 17:34

## 2025-03-16 RX ADMIN — IRON SUCROSE 110 MILLIGRAM(S): 20 INJECTION, SOLUTION INTRAVENOUS at 17:34

## 2025-03-16 RX ADMIN — ESCITALOPRAM OXALATE 10 MILLIGRAM(S): 20 TABLET ORAL at 12:49

## 2025-03-16 RX ADMIN — INSULIN GLARGINE-YFGN 12 UNIT(S): 100 INJECTION, SOLUTION SUBCUTANEOUS at 00:17

## 2025-03-16 RX ADMIN — Medication 0.1 MILLIGRAM(S): at 00:11

## 2025-03-16 RX ADMIN — BUPROPION HYDROBROMIDE 300 MILLIGRAM(S): 522 TABLET, EXTENDED RELEASE ORAL at 12:53

## 2025-03-16 RX ADMIN — TACROLIMUS 1 MILLIGRAM(S): 0.5 CAPSULE ORAL at 08:38

## 2025-03-16 RX ADMIN — Medication 500 MILLIGRAM(S): at 06:17

## 2025-03-16 RX ADMIN — ROSUVASTATIN CALCIUM 10 MILLIGRAM(S): 20 TABLET, FILM COATED ORAL at 00:12

## 2025-03-16 RX ADMIN — HEPARIN SODIUM 5000 UNIT(S): 1000 INJECTION INTRAVENOUS; SUBCUTANEOUS at 21:32

## 2025-03-16 RX ADMIN — Medication 88 MICROGRAM(S): at 06:17

## 2025-03-16 RX ADMIN — QUETIAPINE FUMARATE 25 MILLIGRAM(S): 25 TABLET ORAL at 21:31

## 2025-03-16 RX ADMIN — Medication 81 MILLIGRAM(S): at 12:49

## 2025-03-16 RX ADMIN — PREDNISONE 5 MILLIGRAM(S): 20 TABLET ORAL at 06:17

## 2025-03-16 RX ADMIN — Medication 2 TABLET(S): at 21:31

## 2025-03-16 RX ADMIN — INSULIN LISPRO 5 UNIT(S): 100 INJECTION, SOLUTION INTRAVENOUS; SUBCUTANEOUS at 12:48

## 2025-03-16 RX ADMIN — ERTAPENEM SODIUM 100 MILLIGRAM(S): 1 INJECTION, POWDER, LYOPHILIZED, FOR SOLUTION INTRAMUSCULAR; INTRAVENOUS at 00:11

## 2025-03-16 RX ADMIN — INSULIN LISPRO 10: 100 INJECTION, SOLUTION INTRAVENOUS; SUBCUTANEOUS at 12:48

## 2025-03-16 RX ADMIN — INSULIN GLARGINE-YFGN 12 UNIT(S): 100 INJECTION, SOLUTION SUBCUTANEOUS at 21:31

## 2025-03-16 RX ADMIN — Medication 0.1 MILLIGRAM(S): at 21:30

## 2025-03-16 RX ADMIN — INSULIN GLARGINE-YFGN 5 UNIT(S): 100 INJECTION, SOLUTION SUBCUTANEOUS at 08:37

## 2025-03-16 RX ADMIN — QUETIAPINE FUMARATE 25 MILLIGRAM(S): 25 TABLET ORAL at 00:10

## 2025-03-16 RX ADMIN — INSULIN LISPRO 12: 100 INJECTION, SOLUTION INTRAVENOUS; SUBCUTANEOUS at 17:34

## 2025-03-16 RX ADMIN — Medication 0.1 MILLIGRAM(S): at 06:17

## 2025-03-16 RX ADMIN — INSULIN LISPRO 5 UNIT(S): 100 INJECTION, SOLUTION INTRAVENOUS; SUBCUTANEOUS at 17:35

## 2025-03-16 RX ADMIN — ERTAPENEM SODIUM 100 MILLIGRAM(S): 1 INJECTION, POWDER, LYOPHILIZED, FOR SOLUTION INTRAMUSCULAR; INTRAVENOUS at 23:40

## 2025-03-16 RX ADMIN — INSULIN LISPRO 6: 100 INJECTION, SOLUTION INTRAVENOUS; SUBCUTANEOUS at 08:37

## 2025-03-16 RX ADMIN — INSULIN LISPRO 4: 100 INJECTION, SOLUTION INTRAVENOUS; SUBCUTANEOUS at 00:16

## 2025-03-16 RX ADMIN — Medication 0.1 MILLIGRAM(S): at 12:56

## 2025-03-16 RX ADMIN — Medication 500 MILLIGRAM(S): at 17:34

## 2025-03-16 RX ADMIN — TACROLIMUS 1 MILLIGRAM(S): 0.5 CAPSULE ORAL at 00:10

## 2025-03-16 RX ADMIN — Medication 40 MILLIGRAM(S): at 06:17

## 2025-03-16 RX ADMIN — Medication 1 TABLET(S): at 12:49

## 2025-03-16 RX ADMIN — INSULIN LISPRO 6: 100 INJECTION, SOLUTION INTRAVENOUS; SUBCUTANEOUS at 21:31

## 2025-03-16 RX ADMIN — DAPTOMYCIN 126 MILLIGRAM(S): 500 INJECTION, POWDER, LYOPHILIZED, FOR SOLUTION INTRAVENOUS at 17:33

## 2025-03-16 RX ADMIN — Medication 1 APPLICATION(S): at 06:18

## 2025-03-16 NOTE — DIETITIAN INITIAL EVALUATION ADULT - NSFNSPHYEXAMSKINFT_GEN_A_CORE
per nursing flow sheets, pt with sacral spine pressure injury stage >/2 and sacral pressure injury stage >/2

## 2025-03-16 NOTE — PROGRESS NOTE ADULT - SUBJECTIVE AND OBJECTIVE BOX
INFECTIOUS DISEASES FOLLOW UP-- Rosanna Herrmann  [phone]    This is a follow up note for this  69yMale with  Repeated falls        ROS:  CONSTITUTIONAL:  No fever, good appetite  CARDIOVASCULAR:  No chest pain or palpitations  RESPIRATORY:  No dyspnea  GASTROINTESTINAL:  No nausea, vomiting, diarrhea, or abdominal pain  GENITOURINARY:  No dysuria  NEUROLOGIC:  No headache,     Allergies    No Known Allergies    Intolerances        ANTIBIOTICS/RELEVANT:  antimicrobials  azithromycin   Tablet 500 milliGRAM(s) Oral every 24 hours  DAPTOmycin IVPB      DAPTOmycin IVPB 650 milliGRAM(s) IV Intermittent every 24 hours  ertapenem  IVPB 1000 milliGRAM(s) IV Intermittent every 24 hours  trimethoprim  160 mG/sulfamethoxazole 800 mG 1 Tablet(s) Oral daily  valACYclovir 500 milliGRAM(s) Oral every 12 hours    immunologic:  tacrolimus 1 milliGRAM(s) Oral two times a day    OTHER:  acetaminophen     Tablet .. 1000 milliGRAM(s) Oral every 6 hours PRN  aluminum hydroxide/magnesium hydroxide/simethicone Suspension 30 milliLiter(s) Oral every 4 hours PRN  aspirin enteric coated 81 milliGRAM(s) Oral daily  buPROPion XL (24-Hour) . 300 milliGRAM(s) Oral daily  chlorhexidine 2% Cloths 1 Application(s) Topical <User Schedule>  cloNIDine 0.1 milliGRAM(s) Oral three times a day  dextrose 5%. 1000 milliLiter(s) IV Continuous <Continuous>  dextrose 5%. 1000 milliLiter(s) IV Continuous <Continuous>  dextrose 50% Injectable 25 Gram(s) IV Push once  dextrose 50% Injectable 12.5 Gram(s) IV Push once  dextrose 50% Injectable 25 Gram(s) IV Push once  dextrose Oral Gel 15 Gram(s) Oral once PRN  escitalopram 10 milliGRAM(s) Oral daily  glucagon  Injectable 1 milliGRAM(s) IntraMuscular once  heparin   Injectable 5000 Unit(s) SubCutaneous every 8 hours  insulin glargine Injectable (LANTUS) 12 Unit(s) SubCutaneous at bedtime  insulin glargine Injectable (LANTUS) 5 Unit(s) SubCutaneous every morning  insulin lispro (ADMELOG) corrective regimen sliding scale   SubCutaneous three times a day before meals  insulin lispro (ADMELOG) corrective regimen sliding scale   SubCutaneous at bedtime  insulin lispro Injectable (ADMELOG) 5 Unit(s) SubCutaneous three times a day before meals  iron sucrose IVPB 200 milliGRAM(s) IV Intermittent every 24 hours  lactated ringers. 1000 milliLiter(s) IV Continuous <Continuous>  levothyroxine 88 MICROGram(s) Oral daily  melatonin 3 milliGRAM(s) Oral at bedtime  OLANZapine Injectable 2.5 milliGRAM(s) IntraMuscular every 6 hours PRN  ondansetron Injectable 4 milliGRAM(s) IV Push every 8 hours PRN  pantoprazole    Tablet 40 milliGRAM(s) Oral before breakfast  polyethylene glycol 3350 17 Gram(s) Oral two times a day  predniSONE   Tablet 5 milliGRAM(s) Oral daily  QUEtiapine 25 milliGRAM(s) Oral at bedtime  rosuvastatin 10 milliGRAM(s) Oral at bedtime  senna 2 Tablet(s) Oral at bedtime      Objective:  Vital Signs Last 24 Hrs  T(C): 36.4 (16 Mar 2025 12:07), Max: 37 (16 Mar 2025 00:00)  T(F): 97.6 (16 Mar 2025 12:07), Max: 98.6 (16 Mar 2025 00:00)  HR: 65 (16 Mar 2025 12:07) (60 - 69)  BP: 149/90 (16 Mar 2025 12:07) (149/90 - 180/60)  BP(mean): --  RR: 18 (16 Mar 2025 12:07) (18 - 18)  SpO2: 95% (16 Mar 2025 12:07) (95% - 97%)    Parameters below as of 16 Mar 2025 12:07  Patient On (Oxygen Delivery Method): room air        PHYSICAL EXAM:  Constitutional:no acute distress  Eyes:TRACI, EOMI  Ear/Nose/Throat: no oral lesions, 	  Respiratory: clear BL  Cardiovascular: S1S2  Gastrointestinal:soft, (+) BS, no tenderness  Extremities:no e/e/c  No Lymphadenopathy  IV sites not inflammed.    LABS:                MICROBIOLOGY:      GI PCR Panel Stool (03.14.25 @ 13:05)    GI PCR Panel: Detected: GI Panel PCR evaluates for:  Campylobacter, Plesiomonas shigelloides, Salmonella, Vibrio, Yersinia  enterocolitica, Enteroaggregative Escherichia (EAEC), Enteropathogenic E.  coli (EPEC), Enterotoxigenic E. coli (ETEC), Shiga-like toxin producing  E.coli (STEC), E. coli O157, Shigella/Enteroinvasive E. coli (EIEC),  Adenovirus, Astrovirus, Norovirus, Rotavirus, Sapovirus, Cryptosporidium,  Cyclospora cayetanensis, Entamoeba histolytica, Giardia lamblia.  For culture and susceptibility reports refer to "reflex stool culture".   Campylobacter: Detected: Antibiotic treatment for Campylobacter may be indicated for severe  infections and high-risk patients; please contact the lab if antibiotic  testing is needed.          RECENT CULTURES:  03-13 @ 16:08  Catheterized Catheterized  --  --  --    10,000 - 49,000 CFU/mL Candida albicans  "Susceptibilities not performed"  --  03-13 @ 15:06  Blood Blood-Peripheral  --  --  --    Growth in aerobic and anaerobic bottles: Staphylococcus hominis  Growth in anaerobic bottle: Staphylococcus epidermidis  "Susceptibilities not performed"  --  03-13 @ 15:01  Blood Blood-Peripheral  Blood Culture PCR  Staphylococcus haemolyticus  Staphylococcus hominis  Blood Culture PCR  PCR    Growth in aerobic bottle: Staphylococcus haemolyticus  Growth in aerobic bottle: Staphylococcus hominis  Direct identification is available within approximately 3-5  hours either by Blood Panel Multiplexed PCR or Direct  MALDI-TOF. Details: [email or web address]  --      RADIOLOGY & ADDITIONAL STUDIES:    < from: CT Abdomen and Pelvis w/ IV Cont (03.13.25 @ 17:26) >  IMPRESSION:  Diffuse rectal and sigmoid wall likely due to a severe proctocolitis.    Mild bladder wall thickening with adjacent fat stranding, likely due to   cystitis.    Low-attenuation lesions within the spleen and central liver, decreased in   size from 12/9/2024 compatible positive response totreatment.    Unchanged retroperitoneal soft tissue encasing the aorta and IVC, which   could be due to post transplant lymphoproliferative disease given the   history of prior kidney transplant or retroperitoneal fibrosis.    Persistent lucency and heterogeneity within the sacrum, which could be   due to the patient's lymphoma, though ostium colitis could have a similar   appearance in the appropriate clinical setting.    < end of copied text >

## 2025-03-16 NOTE — DIETITIAN INITIAL EVALUATION ADULT - PROBLEM SELECTOR PLAN 5
Renal tx recipient on tacrolimus 1 mg BID, valacyclovir 500 mg q12, and bactrim DS daily for ppx per med rec. MMF was on hold pending transplant neph f/u. Cr on admission 0.85, appears around baseline.    Plan:  -c/w tacro 1 mg BID and f/u tacro level before AM dose  - holding mycophenalate  - consult transplant nephrology and transplant ID in AM  -c/w valacyclovir 500 mg q12 for ppx  -c/w bactrim DS ppx

## 2025-03-16 NOTE — DIETITIAN INITIAL EVALUATION ADULT - REASON INDICATOR FOR ASSESSMENT
Nutrition consult warranted for: MST score 2 or more and pressure injury stage >/2   Information obtained from: electronic medical record, previous RD notes, and patient  Chart reviewed, events noted.

## 2025-03-16 NOTE — DIETITIAN INITIAL EVALUATION ADULT - PROBLEM SELECTOR PLAN 2
UA with moderate leuk esterase, neg nitrite, 0-2 wbc. CT A/P with evidence of cystitis. Patient unable to endorse symptoms 2/2 encephalopathy. Hx of Klebsiella ESBL UTIs    Plan:  -broaden to Ertapenem given history of ESBL UTIs, de-escalate based on culture data (3/13-  -as above for sepsis  -transplant ID consult in AM

## 2025-03-16 NOTE — DIETITIAN INITIAL EVALUATION ADULT - ORAL INTAKE PTA/DIET HISTORY
Pt reports good appetite/PO intake PTA. Pt reports no known food allergies/intolerances.   Per [facility] paperwork: Pt was ordered for a No Concentrated Sweets, Low Sodium diet with Regular consistency. Also was ordered for LPS Critical Care 30ml/PO 2x/day

## 2025-03-16 NOTE — DIETITIAN INITIAL EVALUATION ADULT - PHYSCIAL ASSESSMENT
Current Admission Weights:  - Dosing weight:  81.6 kg/179.14 pounds  (3/13/25)    Weight history:  per Hillary HIE:  - 92kg/ 202.86 pounds (12/10/24)    per previous RD notes:  - 79.2 kg/ 174.2 pounds (12/29/24)  - 75.6 kg/ 166.7 pounds  (1/17/25)

## 2025-03-16 NOTE — DIETITIAN INITIAL EVALUATION ADULT - PROBLEM SELECTOR PLAN 3
Patient incoherent and aggressive on exam, likely toxic metabolic encephalopathy iso sepsis. CT head w/o acute infarct, hematoma, or mass effect.     Plan:  -treatment for infection as above  -f/u TSH, B12, RPR for additional reversible etiologies  -delirium precautions  -aspiration precautions

## 2025-03-16 NOTE — PROGRESS NOTE ADULT - ASSESSMENT
69 year old M with a history of renal transplant (2012) on tacrolimus, s/p left nephrectomy, DLBCL s/p C1 R mini CHOP (chemo held 2/2 recent infections), peripheral neuropathy, hypothyroidism, retroperitoneal fibrosis encasing veins, HTN, HLD, GERD, anxiety/depression, ANGELIKA and recent admission at [facility] (1/17 - 2/6/25) for sepsis 2/2 Klebsiella pneumoniae ESBL UTI and AHRF c/f PJP pneumonia (s/p completion of atovaquone and pred course) presenting after unwitnessed fall at his rehab facility.     On assessment patient oriented to self and place but incoherent and verbally aggressive w tangential thoughts, refusing exam and assessment and unable to answer history questions. Details regarding the fall are limited as collateral information limited. Attempted to call patient's wife who is the emergency contact but was not able to reach. Patient's son was contacted who reports his father "acts this way" when he has infections but was unsure about preceding events.     On arrival febrile to 100.7 rectally, /65, HR 88, RR 18, Sat 100% on RA. RVP neg. UA w mod leuk esterase, neg nitrite, 0-2 wbc. CT head/ c spine neg for acute territorial infarct, hematoma or mass effect. No acute fracture or subluxation of the cervical spine. CT A/P with w severe proctocolitis and cystitis. Received Vanc and zosyn x1, 1 L LR bolus,     # renal transplant  # immunosuppression  # history of multiple prior transplant related UTIs  # high grade coag negative staph bacteremia    Would:  Repeat blood cultures x2 sets for evidence of clearing uNclear at this point if procurement contaminants or true infection    Daptomycin 8mg/kg/day 650mg daily  Send baseline and weekly CPK  continue Ertapenem pending urine studies  send BK virus serum viral load    # proctocolitis:  Send CMV viral load  Send HSV viral load  Send EBV viral load  IF does not improve will ask GI to evaluate    GI-PCR with campylobacter  Please send stool for culture  check ECG for Qtc interval  start Azithromycin 250mg daily    # recent past treatment for PJP pneumonia (presumptive)  send fungitell and LDH  breathing comfortably on room air  so unlikely  check CT chest non contrast    Quintin Herrmann MD  Can be called via Teams  After 5pm/weekends [phone]

## 2025-03-16 NOTE — DIETITIAN INITIAL EVALUATION ADULT - PROBLEM SELECTOR PLAN 4
Unwitnessed fall at rehab. CT head w/o acute infarct, hematoma, or mass effect. CT C spine w/o acute fractures. Patient is unable to participate in history taking iso acute delirium. Suspect fall likely iso sepsis and toxic metabolic encephalopathy. BP stable in ED. EKG normal sinus rhythm, no evidence of arrhythmia    Plan:  - treatment of infection as above  - Obtain orthostatic vital signs when pt able to cooperate  - PT eval  -fall precautions

## 2025-03-16 NOTE — DIETITIAN INITIAL EVALUATION ADULT - PROBLEM SELECTOR PLAN 7
Previous admission in Jan- Feb 2025 for R mini chop cycle 2, but chemo held at the time due to AHRF c/f PJP PNA, completed atovaquone/pred course    Plan:  -c/w bactrim DS daily for PJP ppx  -heme onc consult in AM

## 2025-03-16 NOTE — DIETITIAN INITIAL EVALUATION ADULT - PROBLEM SELECTOR PLAN 1
Febrile to 100.7 rectally, saturating well on RA. WBC 10.55 w 90% neutrophils, lactate 2.7. UA positive and CT A/P with diffuse rectal and sigmoid wall likely due to a severe proctocolitis. Mild bladder wall thickening with adjacent fat stranding, likely due to cystitis, retroperitoneal fibrosis, heterogeneity within the sacrum 2/2 lymphoma vs ostium colitis. CXR clear. RVP neg. s/p Vanc and zosyn in ED    Plan:  -broaden to Ertapenem given history of ESBL UTIs, de-escalate based on culture data (3/13-  -c/w IVF LR 75 cc/hr and f/u repeat lactate in AM  -f/u infectious workup: blood cx, urine cx, MRSA swab  -transplant ID consult in AM  -bowel regimen for proctocolitis

## 2025-03-16 NOTE — DIETITIAN INITIAL EVALUATION ADULT - PERTINENT LABORATORY DATA
POCT Blood Glucose.: 276 mg/dL (03-16-25 @ 08:19)  A1C with Estimated Average Glucose Result: 10.1 % (03-14-25 @ 07:08)  A1C with Estimated Average Glucose Result: 9.6 % (03-13-25 @ 15:26)  A1C with Estimated Average Glucose Result: 8.4 % (01-28-25 @ 07:38)

## 2025-03-16 NOTE — DIETITIAN INITIAL EVALUATION ADULT - SIGNS/SYMPTOMS
DLBCL diagnosis and pressure injury stage >/2  moderate muscle/fat loss, 11% weight loss x >/3 months

## 2025-03-16 NOTE — PROGRESS NOTE ADULT - ASSESSMENT
69 year old M with a history of renal transplant (2012) on tacrolimus, s/p left nephrectomy, DLBCL s/p C1 R mini CHOP (chemo held 2/2 recent infections), peripheral neuropathy, hypothyroidism, retroperitoneal fibrosis encasing veins, HTN, HLD, GERD, anxiety/depression, ANGELIKA and recent admission at [facility] (1/17 - 2/6/25) for sepsis 2/2 Klebsiella pneumoniae ESBL UTI and AHRF c/f PJP pneumonia (s/p completion of atovaquone and pred course) pw unwitnessed fall, in setting of sepsis likely 2/2 UTI and toxic metabolic encephalopathy, CT A/P w severe proctocolitis and cystitis

## 2025-03-16 NOTE — DIETITIAN INITIAL EVALUATION ADULT - NSFNSADHERENCEPTAFT_GEN_A_CORE
- Hx of DM2, HbA1c 10.1% (3/14/2025), suggests poor glycemic control. To note, increased HbA1c noted since last admission, A1c=7.8 % (12-29-24).

## 2025-03-16 NOTE — PROGRESS NOTE ADULT - SUBJECTIVE AND OBJECTIVE BOX
Andre Reyes, M.D.  Office: [phone]  Available thru [employer] Teams     Patient is a 69y old  Male who presents with a chief complaint of Toxic metabolic encephalopathy, sepsis 2/2 UTI (15 Mar 2025 09:24)          SUBJECTIVE / OVERNIGHT EVENTS:    No acute overnight events.    ROS: ( - ) Fever, ( - )Chills,  ( - )Nausea/Vomiting, ( - ) Cough, ( - )Shortness of breath, ( - )Chest Pain    MEDICATIONS  (STANDING):  aspirin enteric coated 81 milliGRAM(s) Oral daily  buPROPion XL (24-Hour) . 300 milliGRAM(s) Oral daily  chlorhexidine 2% Cloths 1 Application(s) Topical <User Schedule>  cloNIDine 0.1 milliGRAM(s) Oral three times a day  DAPTOmycin IVPB      DAPTOmycin IVPB 650 milliGRAM(s) IV Intermittent every 24 hours  dextrose 5%. 1000 milliLiter(s) (100 mL/Hr) IV Continuous <Continuous>  dextrose 5%. 1000 milliLiter(s) (50 mL/Hr) IV Continuous <Continuous>  dextrose 50% Injectable 25 Gram(s) IV Push once  dextrose 50% Injectable 12.5 Gram(s) IV Push once  dextrose 50% Injectable 25 Gram(s) IV Push once  ertapenem  IVPB 1000 milliGRAM(s) IV Intermittent every 24 hours  escitalopram 10 milliGRAM(s) Oral daily  glucagon  Injectable 1 milliGRAM(s) IntraMuscular once  heparin   Injectable 5000 Unit(s) SubCutaneous every 8 hours  insulin glargine Injectable (LANTUS) 5 Unit(s) SubCutaneous every morning  insulin glargine Injectable (LANTUS) 12 Unit(s) SubCutaneous at bedtime  insulin lispro (ADMELOG) corrective regimen sliding scale   SubCutaneous three times a day before meals  insulin lispro (ADMELOG) corrective regimen sliding scale   SubCutaneous at bedtime  iron sucrose IVPB 200 milliGRAM(s) IV Intermittent every 24 hours  lactated ringers. 1000 milliLiter(s) (75 mL/Hr) IV Continuous <Continuous>  levothyroxine 88 MICROGram(s) Oral daily  pantoprazole    Tablet 40 milliGRAM(s) Oral before breakfast  polyethylene glycol 3350 17 Gram(s) Oral two times a day  predniSONE   Tablet 5 milliGRAM(s) Oral daily  QUEtiapine 25 milliGRAM(s) Oral at bedtime  rosuvastatin 10 milliGRAM(s) Oral at bedtime  senna 2 Tablet(s) Oral at bedtime  tacrolimus 1 milliGRAM(s) Oral two times a day  trimethoprim  160 mG/sulfamethoxazole 800 mG 1 Tablet(s) Oral daily  valACYclovir 500 milliGRAM(s) Oral every 12 hours    MEDICATIONS  (PRN):  acetaminophen     Tablet .. 1000 milliGRAM(s) Oral every 6 hours PRN Temp greater or equal to 38C (100.4F), Mild Pain (1 - 3)  aluminum hydroxide/magnesium hydroxide/simethicone Suspension 30 milliLiter(s) Oral every 4 hours PRN Dyspepsia  dextrose Oral Gel 15 Gram(s) Oral once PRN Blood Glucose LESS THAN 70 milliGRAM(s)/deciliter  melatonin 3 milliGRAM(s) Oral at bedtime PRN Insomnia  OLANZapine Injectable 2.5 milliGRAM(s) IntraMuscular every 6 hours PRN for agitation  ondansetron Injectable 4 milliGRAM(s) IV Push every 8 hours PRN Nausea and/or Vomiting          T(C): 36.5 (03-16 @ 06:20), Max: 37 (03-16 @ 00:00)   HR: 62   BP: 169/78   RR: 18   SpO2: 95%    PHYSICAL EXAM:    CONSTITUTIONAL: NAD, well-developed, well-groomed  EYES: PERRLA; conjunctiva and sclera clear  ENMT: Moist oral mucosa, no pharyngeal injection or exudates; normal dentition  RESPIRATORY: Normal respiratory effort; lungs are clear to auscultation bilaterally  CARDIOVASCULAR: Regular rate and rhythm, normal S1 and S2, no murmur/rub/gallop; No lower extremity edema; Peripheral pulses are 2+ bilaterally  ABDOMEN: Nontender to palpation, normoactive bowel sounds, no rebound/guarding; No hepatosplenomegaly  MUSCULOSKELETAL:  no clubbing or cyanosis of digits; no joint swelling or tenderness to palpation  PSYCH: A+O to person, place, and time; affect appropriate  NEUROLOGY: CN 2-12 are intact and symmetric; no gross sensory deficits       LABS:              CAPILLARY BLOOD GLUCOSE      POCT Blood Glucose.: 276 mg/dL (16 Mar 2025 08:19)  POCT Blood Glucose.: 363 mg/dL (16 Mar 2025 02:50)  POCT Blood Glucose.: 409 mg/dL (16 Mar 2025 00:14)  POCT Blood Glucose.: 376 mg/dL (15 Mar 2025 16:57)  POCT Blood Glucose.: 372 mg/dL (15 Mar 2025 10:30)      RADIOLOGY & ADDITIONAL TESTS:    Imaging Personally Reviewed:  Consultant(s) Notes Reviewed:    Care Discussed with Consultants/Other Providers:   Andre Reyes, M.D.  Office: [phone]  Available thru [employer] Teams     Patient is a 69y old  Male who presents with a chief complaint of Toxic metabolic encephalopathy, sepsis 2/2 UTI (15 Mar 2025 09:24)          SUBJECTIVE / OVERNIGHT EVENTS:    No acute overnight events.  no new complaints  per RN pt is more cooperative today    ROS: ( - ) Fever, ( - )Chills,  ( - )Nausea/Vomiting, ( - ) Cough, ( - )Shortness of breath, ( - )Chest Pain    MEDICATIONS  (STANDING):  aspirin enteric coated 81 milliGRAM(s) Oral daily  buPROPion XL (24-Hour) . 300 milliGRAM(s) Oral daily  chlorhexidine 2% Cloths 1 Application(s) Topical <User Schedule>  cloNIDine 0.1 milliGRAM(s) Oral three times a day  DAPTOmycin IVPB      DAPTOmycin IVPB 650 milliGRAM(s) IV Intermittent every 24 hours  dextrose 5%. 1000 milliLiter(s) (100 mL/Hr) IV Continuous <Continuous>  dextrose 5%. 1000 milliLiter(s) (50 mL/Hr) IV Continuous <Continuous>  dextrose 50% Injectable 25 Gram(s) IV Push once  dextrose 50% Injectable 12.5 Gram(s) IV Push once  dextrose 50% Injectable 25 Gram(s) IV Push once  ertapenem  IVPB 1000 milliGRAM(s) IV Intermittent every 24 hours  escitalopram 10 milliGRAM(s) Oral daily  glucagon  Injectable 1 milliGRAM(s) IntraMuscular once  heparin   Injectable 5000 Unit(s) SubCutaneous every 8 hours  insulin glargine Injectable (LANTUS) 5 Unit(s) SubCutaneous every morning  insulin glargine Injectable (LANTUS) 12 Unit(s) SubCutaneous at bedtime  insulin lispro (ADMELOG) corrective regimen sliding scale   SubCutaneous three times a day before meals  insulin lispro (ADMELOG) corrective regimen sliding scale   SubCutaneous at bedtime  iron sucrose IVPB 200 milliGRAM(s) IV Intermittent every 24 hours  lactated ringers. 1000 milliLiter(s) (75 mL/Hr) IV Continuous <Continuous>  levothyroxine 88 MICROGram(s) Oral daily  pantoprazole    Tablet 40 milliGRAM(s) Oral before breakfast  polyethylene glycol 3350 17 Gram(s) Oral two times a day  predniSONE   Tablet 5 milliGRAM(s) Oral daily  QUEtiapine 25 milliGRAM(s) Oral at bedtime  rosuvastatin 10 milliGRAM(s) Oral at bedtime  senna 2 Tablet(s) Oral at bedtime  tacrolimus 1 milliGRAM(s) Oral two times a day  trimethoprim  160 mG/sulfamethoxazole 800 mG 1 Tablet(s) Oral daily  valACYclovir 500 milliGRAM(s) Oral every 12 hours    MEDICATIONS  (PRN):  acetaminophen     Tablet .. 1000 milliGRAM(s) Oral every 6 hours PRN Temp greater or equal to 38C (100.4F), Mild Pain (1 - 3)  aluminum hydroxide/magnesium hydroxide/simethicone Suspension 30 milliLiter(s) Oral every 4 hours PRN Dyspepsia  dextrose Oral Gel 15 Gram(s) Oral once PRN Blood Glucose LESS THAN 70 milliGRAM(s)/deciliter  melatonin 3 milliGRAM(s) Oral at bedtime PRN Insomnia  OLANZapine Injectable 2.5 milliGRAM(s) IntraMuscular every 6 hours PRN for agitation  ondansetron Injectable 4 milliGRAM(s) IV Push every 8 hours PRN Nausea and/or Vomiting          T(C): 36.5 (03-16 @ 06:20), Max: 37 (03-16 @ 00:00)   HR: 62   BP: 169/78   RR: 18   SpO2: 95%    PHYSICAL EXAM:    CONSTITUTIONAL: NAD, well-developed, well-groomed  EYES: PERRLA; conjunctiva and sclera clear  ENMT: Moist oral mucosa, no pharyngeal injection or exudates; normal dentition  RESPIRATORY: Normal respiratory effort; lungs are clear to auscultation bilaterally  CARDIOVASCULAR: Regular rate and rhythm, normal S1 and S2, no murmur/rub/gallop; No lower extremity edema; Peripheral pulses are 2+ bilaterally  ABDOMEN: Nontender to palpation, normoactive bowel sounds, no rebound/guarding; No hepatosplenomegaly  MUSCULOSKELETAL:  no clubbing or cyanosis of digits; no joint swelling or tenderness to palpation  PSYCH: A+O to person, place, and time; affect appropriate  NEUROLOGY: CN 2-12 are intact and symmetric; no gross sensory deficits       LABS:              CAPILLARY BLOOD GLUCOSE      POCT Blood Glucose.: 276 mg/dL (16 Mar 2025 08:19)  POCT Blood Glucose.: 363 mg/dL (16 Mar 2025 02:50)  POCT Blood Glucose.: 409 mg/dL (16 Mar 2025 00:14)  POCT Blood Glucose.: 376 mg/dL (15 Mar 2025 16:57)  POCT Blood Glucose.: 372 mg/dL (15 Mar 2025 10:30)      RADIOLOGY & ADDITIONAL TESTS:    Imaging Personally Reviewed:  Consultant(s) Notes Reviewed:    Care Discussed with Consultants/Other Providers:

## 2025-03-16 NOTE — DIETITIAN INITIAL EVALUATION ADULT - PERTINENT MEDS FT
MEDICATIONS  (STANDING):  aspirin enteric coated 81 milliGRAM(s) Oral daily  buPROPion XL (24-Hour) . 300 milliGRAM(s) Oral daily  chlorhexidine 2% Cloths 1 Application(s) Topical <User Schedule>  cloNIDine 0.1 milliGRAM(s) Oral three times a day  DAPTOmycin IVPB      DAPTOmycin IVPB 650 milliGRAM(s) IV Intermittent every 24 hours  dextrose 5%. 1000 milliLiter(s) (100 mL/Hr) IV Continuous <Continuous>  dextrose 5%. 1000 milliLiter(s) (50 mL/Hr) IV Continuous <Continuous>  dextrose 50% Injectable 25 Gram(s) IV Push once  dextrose 50% Injectable 12.5 Gram(s) IV Push once  dextrose 50% Injectable 25 Gram(s) IV Push once  ertapenem  IVPB 1000 milliGRAM(s) IV Intermittent every 24 hours  escitalopram 10 milliGRAM(s) Oral daily  glucagon  Injectable 1 milliGRAM(s) IntraMuscular once  heparin   Injectable 5000 Unit(s) SubCutaneous every 8 hours  insulin glargine Injectable (LANTUS) 5 Unit(s) SubCutaneous every morning  insulin glargine Injectable (LANTUS) 12 Unit(s) SubCutaneous at bedtime  insulin lispro (ADMELOG) corrective regimen sliding scale   SubCutaneous three times a day before meals  insulin lispro (ADMELOG) corrective regimen sliding scale   SubCutaneous at bedtime  iron sucrose IVPB 200 milliGRAM(s) IV Intermittent every 24 hours  lactated ringers. 1000 milliLiter(s) (75 mL/Hr) IV Continuous <Continuous>  levothyroxine 88 MICROGram(s) Oral daily  melatonin 3 milliGRAM(s) Oral at bedtime  pantoprazole    Tablet 40 milliGRAM(s) Oral before breakfast  polyethylene glycol 3350 17 Gram(s) Oral two times a day  predniSONE   Tablet 5 milliGRAM(s) Oral daily  QUEtiapine 25 milliGRAM(s) Oral at bedtime  rosuvastatin 10 milliGRAM(s) Oral at bedtime  senna 2 Tablet(s) Oral at bedtime  tacrolimus 1 milliGRAM(s) Oral two times a day  trimethoprim  160 mG/sulfamethoxazole 800 mG 1 Tablet(s) Oral daily  valACYclovir 500 milliGRAM(s) Oral every 12 hours    MEDICATIONS  (PRN):  acetaminophen     Tablet .. 1000 milliGRAM(s) Oral every 6 hours PRN Temp greater or equal to 38C (100.4F), Mild Pain (1 - 3)  aluminum hydroxide/magnesium hydroxide/simethicone Suspension 30 milliLiter(s) Oral every 4 hours PRN Dyspepsia  dextrose Oral Gel 15 Gram(s) Oral once PRN Blood Glucose LESS THAN 70 milliGRAM(s)/deciliter  OLANZapine Injectable 2.5 milliGRAM(s) IntraMuscular every 6 hours PRN for agitation  ondansetron Injectable 4 milliGRAM(s) IV Push every 8 hours PRN Nausea and/or Vomiting

## 2025-03-16 NOTE — DIETITIAN INITIAL EVALUATION ADULT - ADD RECOMMEND
1. Continue current diet order: regular diet   2. Recommend Ensure Plus HP 1x/day (350 kcal, 20g protein)   3. Monitor and encourage PO intake. Encourage use of daily menus. Honor dietary preferences as expressed as able.   4. New malnutrition notification sent.  1. Continue current diet order: regular diet   2. Recommend Ensure Plus HP 1x/day (350 kcal, 20g protein)   3. Monitor and encourage PO intake. Encourage use of daily menus. Honor dietary preferences as expressed as able.   4. New malnutrition notification sent.   5. If not medically contraindicated, recommend multivitamin and vitamin C 500 mg daily to promote wound healing.

## 2025-03-16 NOTE — DIETITIAN INITIAL EVALUATION ADULT - ENERGY INTAKE
- Pt reports good appetite/PO intake; RD observed breakfast tray at bedside with >75% of meal consumed.

## 2025-03-16 NOTE — DIETITIAN INITIAL EVALUATION ADULT - OTHER INFO
Renal:  - Hx of renal transplant; Tacrolimus for immunosuppression ordered     Endo:  - Ordered for Prednisone for post-transplant steroid regimen   -  Insulin sliding scale, Lantus ordered for blood glucose management.

## 2025-03-17 DIAGNOSIS — A41.1 SEPSIS DUE TO OTHER SPECIFIED STAPHYLOCOCCUS: ICD-10-CM

## 2025-03-17 DIAGNOSIS — K52.9 NONINFECTIVE GASTROENTERITIS AND COLITIS, UNSPECIFIED: ICD-10-CM

## 2025-03-17 LAB
ANION GAP SERPL CALC-SCNC: 12 MMOL/L — SIGNIFICANT CHANGE UP (ref 5–17)
BUN SERPL-MCNC: 16 MG/DL — SIGNIFICANT CHANGE UP (ref 7–23)
CALCIUM SERPL-MCNC: 9.7 MG/DL — SIGNIFICANT CHANGE UP (ref 8.4–10.5)
CHLORIDE SERPL-SCNC: 104 MMOL/L — SIGNIFICANT CHANGE UP (ref 96–108)
CMV DNA CSF QL NAA+PROBE: SIGNIFICANT CHANGE UP IU/ML
CMV DNA SPEC NAA+PROBE-LOG#: SIGNIFICANT CHANGE UP LOG10IU/ML
CO2 SERPL-SCNC: 22 MMOL/L — SIGNIFICANT CHANGE UP (ref 22–31)
CREAT SERPL-MCNC: 0.86 MG/DL — SIGNIFICANT CHANGE UP (ref 0.5–1.3)
EGFR: 95 ML/MIN/1.73M2 — SIGNIFICANT CHANGE UP
GLUCOSE BLDC GLUCOMTR-MCNC: 244 MG/DL — HIGH (ref 70–99)
GLUCOSE BLDC GLUCOMTR-MCNC: 248 MG/DL — HIGH (ref 70–99)
GLUCOSE BLDC GLUCOMTR-MCNC: 271 MG/DL — HIGH (ref 70–99)
GLUCOSE BLDC GLUCOMTR-MCNC: 306 MG/DL — HIGH (ref 70–99)
GLUCOSE SERPL-MCNC: 202 MG/DL — HIGH (ref 70–99)
POTASSIUM SERPL-MCNC: 4.3 MMOL/L — SIGNIFICANT CHANGE UP (ref 3.5–5.3)
POTASSIUM SERPL-SCNC: 4.3 MMOL/L — SIGNIFICANT CHANGE UP (ref 3.5–5.3)
SODIUM SERPL-SCNC: 138 MMOL/L — SIGNIFICANT CHANGE UP (ref 135–145)
TACROLIMUS SERPL-MCNC: 3.4 NG/ML — SIGNIFICANT CHANGE UP

## 2025-03-17 PROCEDURE — 99233 SBSQ HOSP IP/OBS HIGH 50: CPT

## 2025-03-17 PROCEDURE — 71250 CT THORAX DX C-: CPT | Mod: 26

## 2025-03-17 PROCEDURE — G0545: CPT

## 2025-03-17 PROCEDURE — 93010 ELECTROCARDIOGRAM REPORT: CPT

## 2025-03-17 RX ADMIN — ERTAPENEM SODIUM 100 MILLIGRAM(S): 1 INJECTION, POWDER, LYOPHILIZED, FOR SOLUTION INTRAMUSCULAR; INTRAVENOUS at 21:40

## 2025-03-17 RX ADMIN — Medication 0.1 MILLIGRAM(S): at 13:35

## 2025-03-17 RX ADMIN — QUETIAPINE FUMARATE 25 MILLIGRAM(S): 25 TABLET ORAL at 21:32

## 2025-03-17 RX ADMIN — Medication 2 TABLET(S): at 21:40

## 2025-03-17 RX ADMIN — PREDNISONE 5 MILLIGRAM(S): 20 TABLET ORAL at 05:51

## 2025-03-17 RX ADMIN — ESCITALOPRAM OXALATE 10 MILLIGRAM(S): 20 TABLET ORAL at 12:39

## 2025-03-17 RX ADMIN — Medication 0.1 MILLIGRAM(S): at 05:51

## 2025-03-17 RX ADMIN — TACROLIMUS 1 MILLIGRAM(S): 0.5 CAPSULE ORAL at 08:39

## 2025-03-17 RX ADMIN — DAPTOMYCIN 126 MILLIGRAM(S): 500 INJECTION, POWDER, LYOPHILIZED, FOR SOLUTION INTRAVENOUS at 16:00

## 2025-03-17 RX ADMIN — INSULIN LISPRO 6: 100 INJECTION, SOLUTION INTRAVENOUS; SUBCUTANEOUS at 08:39

## 2025-03-17 RX ADMIN — INSULIN LISPRO 5 UNIT(S): 100 INJECTION, SOLUTION INTRAVENOUS; SUBCUTANEOUS at 17:21

## 2025-03-17 RX ADMIN — INSULIN LISPRO 5 UNIT(S): 100 INJECTION, SOLUTION INTRAVENOUS; SUBCUTANEOUS at 12:39

## 2025-03-17 RX ADMIN — ROSUVASTATIN CALCIUM 10 MILLIGRAM(S): 20 TABLET, FILM COATED ORAL at 21:31

## 2025-03-17 RX ADMIN — Medication 100 MILLIGRAM(S): at 21:32

## 2025-03-17 RX ADMIN — INSULIN LISPRO 4: 100 INJECTION, SOLUTION INTRAVENOUS; SUBCUTANEOUS at 12:38

## 2025-03-17 RX ADMIN — IRON SUCROSE 110 MILLIGRAM(S): 20 INJECTION, SOLUTION INTRAVENOUS at 16:01

## 2025-03-17 RX ADMIN — Medication 500 MILLIGRAM(S): at 17:22

## 2025-03-17 RX ADMIN — Medication 1 APPLICATION(S): at 05:52

## 2025-03-17 RX ADMIN — POLYETHYLENE GLYCOL 3350 17 GRAM(S): 17 POWDER, FOR SOLUTION ORAL at 17:22

## 2025-03-17 RX ADMIN — INSULIN LISPRO 4: 100 INJECTION, SOLUTION INTRAVENOUS; SUBCUTANEOUS at 21:50

## 2025-03-17 RX ADMIN — Medication 0.1 MILLIGRAM(S): at 21:32

## 2025-03-17 RX ADMIN — Medication 88 MICROGRAM(S): at 05:53

## 2025-03-17 RX ADMIN — HEPARIN SODIUM 5000 UNIT(S): 1000 INJECTION INTRAVENOUS; SUBCUTANEOUS at 21:39

## 2025-03-17 RX ADMIN — INSULIN LISPRO 4: 100 INJECTION, SOLUTION INTRAVENOUS; SUBCUTANEOUS at 17:21

## 2025-03-17 RX ADMIN — Medication 500 MILLIGRAM(S): at 05:51

## 2025-03-17 RX ADMIN — Medication 3 MILLIGRAM(S): at 21:31

## 2025-03-17 RX ADMIN — BUPROPION HYDROBROMIDE 300 MILLIGRAM(S): 522 TABLET, EXTENDED RELEASE ORAL at 12:40

## 2025-03-17 RX ADMIN — Medication 1 TABLET(S): at 12:39

## 2025-03-17 RX ADMIN — Medication 100 MILLIGRAM(S): at 15:58

## 2025-03-17 RX ADMIN — Medication 500 MILLIGRAM(S): at 17:21

## 2025-03-17 RX ADMIN — HEPARIN SODIUM 5000 UNIT(S): 1000 INJECTION INTRAVENOUS; SUBCUTANEOUS at 05:51

## 2025-03-17 RX ADMIN — Medication 81 MILLIGRAM(S): at 12:39

## 2025-03-17 RX ADMIN — INSULIN LISPRO 5 UNIT(S): 100 INJECTION, SOLUTION INTRAVENOUS; SUBCUTANEOUS at 08:39

## 2025-03-17 RX ADMIN — INSULIN GLARGINE-YFGN 12 UNIT(S): 100 INJECTION, SOLUTION SUBCUTANEOUS at 21:50

## 2025-03-17 RX ADMIN — INSULIN GLARGINE-YFGN 5 UNIT(S): 100 INJECTION, SOLUTION SUBCUTANEOUS at 08:40

## 2025-03-17 RX ADMIN — HEPARIN SODIUM 5000 UNIT(S): 1000 INJECTION INTRAVENOUS; SUBCUTANEOUS at 13:35

## 2025-03-17 RX ADMIN — TACROLIMUS 1 MILLIGRAM(S): 0.5 CAPSULE ORAL at 21:51

## 2025-03-17 RX ADMIN — Medication 40 MILLIGRAM(S): at 05:51

## 2025-03-17 NOTE — PROGRESS NOTE ADULT - PROBLEM SELECTOR PLAN 6
BP in 140s systolic in ED. Per rehab med list pt is on Carvedilol 12.5 mg BID, clonidine 0.1 mg TID, hydralazine 100 mg q8    Plan:  -c/w clonidine 0.1 mg TID w hold parameters to prevent rebound HTN  -c/w Coreg 12.5 mg BID w hold parameters  -holding hydral Plan:  -c/w tacro 1 mg BID and f/u tacro level before AM dose  - holding mycophenalate  - consulted transplant nephrology   -c/w valacyclovir 500 mg q12 for ppx  -c/w bactrim DS ppx    #Hx of PJP Pna  -CT chest ordered  -f/u fungtiell and ldh

## 2025-03-17 NOTE — PROGRESS NOTE ADULT - ASSESSMENT
69 year old M with a history of renal transplant (2012) on tacrolimus, s/p left nephrectomy, DLBCL s/p C1 R mini CHOP (chemo held 2/2 recent infections), peripheral neuropathy, hypothyroidism, retroperitoneal fibrosis encasing veins, HTN, HLD, GERD, anxiety/depression, ANGELIKA and recent admission at [facility] (1/17 - 2/6/25) for sepsis 2/2 Klebsiella pneumoniae ESBL UTI and AHRF c/f PJP pneumonia (s/p completion of atovaquone and pred course) pw unwitnessed fall, in setting of sepsis likely 2/2 UTI and toxic metabolic encephalopathy, CT A/P w severe proctocolitis and cystitis

## 2025-03-17 NOTE — PROGRESS NOTE ADULT - ASSESSMENT
69 year old M with a history of renal transplant (2012) on tacrolimus, s/p left nephrectomy, DLBCL s/p C1 R mini CHOP (chemo held 2/2 recent infections), peripheral neuropathy, hypothyroidism, retroperitoneal fibrosis encasing veins, HTN, HLD, GERD, anxiety/depression, ANGELIKA and recent admission at [facility] (1/17 - 2/6/25) for sepsis 2/2 Klebsiella pneumoniae ESBL UTI and AHRF c/f PJP pneumonia (s/p completion of atovaquone and pred course) presenting after unwitnessed fall at his rehab facility.     On assessment patient oriented to self and place but incoherent and verbally aggressive w tangential thoughts, refusing exam and assessment and unable to answer history questions. Details regarding the fall are limited as collateral information limited. Attempted to call patient's wife who is the emergency contact but was not able to reach. Patient's son was contacted who reports his father "acts this way" when he has infections but was unsure about preceding events.     On arrival febrile to 100.7 rectally, /65, HR 88, RR 18, Sat 100% on RA. RVP neg. UA w mod leuk esterase, neg nitrite, 0-2 wbc. CT head/ c spine neg for acute territorial infarct, hematoma or mass effect. No acute fracture or subluxation of the cervical spine. CT A/P with w severe proctocolitis and cystitis. Received Vanc and zosyn x1, 1 L LR bolus,     Ct C/A/p 3/13 diffusese rectal and sigmoid wall likely due to a severe proctocolitis.Mild bladder wall thickening with adjacent fat stranding, likely due to cystitis.  Low-attenuation lesions within the spleen and central liver, decreased in size from 12/9/2024 compatible positive response to treatment.  Unchanged retroperitoneal soft tissue encasing the aorta and IVC, which could be due to post transplant lymphoproliferative disease given the history of prior kidney transplant or retroperitoneal fibrosis.  Persistent lucency and heterogeneity within the sacrum, which could be due to the patient's lymphoma, though ostium colitis could have a similar appearance in the appropriate clinical setting.    # s/p renal transplant 2012   # s/p left nephrectomy  # DLBCL  s/p C1 R mini CHOP (chemo held 2/2 recent infections)  # recent Multifocal pneumonia 1/2025 and fungitell positive treated empirically for PCP  # history of multiple prior transplant related UTIs  # high grade coag negative staph bacteremia  3/4 bottles S hominis, marleny 1/4 S haemolyticus and 1/4 S epi    BC 3/13 pos, Repeat BC 3/16 NGTD  vancomycin 3/13--> daptomycin 3/14-   # candiduria  UA 3/13 0-2 WBC no RBC  UC 3/13 catheterized 10-50K c albicans  # proctocolitis on Ct, Gi PCr positive for   # campylobacter   CMv PCr neg  EBV PCr, pending collection  # erosive changes of the sacrum with no sacral wound 1/23 on Ct A/P      Recommendations:    UA blunt, UC with candiduria in c.o catheterized urine- pelase repeat microscopic UA and UC, for now, holding off on antifungals unless repeat testing positive  Gi PCr positive for Campylobacter- ertapenem provides coverage so he has been effectively treated for this. Recommend stopping azithromycin  Day 5 of ertapanem today tentative plan to stop after today with clinical improvement  if diarrhea recurs, will need to consider colonoscopy  LAst admission with noted ? erosive sacral changes on Ct- given now with questionable bacteremia with CNS taph, favor MRI sacrum and sending ESR/CRP at baseline  continue CNStaph coverage pending repeat BC 3/16- currently on daptomycin- can revise dapto to  vancomycin 1 g q12h  for isolated CNstaph.   send fungitell and LDH, aspergillus GM to ensure trended down after therapy for PCP  Continue bactrim ppx lifelong  check CT chest non contrast- no suspicion for PCP but should have follow up to ensure resolution and no infiltrate suspicious for alternative fungal pathogen         Thank you for involving us in the care of this patient  Transplant ID will continue to follow  Please call or page with additional questions  Pager; #[phone]  Teams: from 8 am to 5 pm  Suzie Argueta MD

## 2025-03-17 NOTE — PROGRESS NOTE ADULT - PROBLEM SELECTOR PLAN 8
Hx of Type 2 DM (A1c 8.4% in Jan 2025) and steroid induced hyperglycemia during PJP tx course. Per rehab records, pt is on 20 units glargine in PM and 10 units glargine in AM and Admelog 24/18/14 premeals    Plan:  - will dose reduce by 50% to 10 units glargine in PM and 5 units in AM + low dose ISS in setting of infection, consider moderate dose ISS if poor control  - monitor FS TIDAC and bedtime  - f/u HbA1c  - consistent carb diet Previous admission in Jan- Feb 2025 for R mini chop cycle 2, but chemo held at the time due to AHRF c/f PJP PNA, completed atovaquone/pred course    Plan:  -c/w bactrim DS daily for PJP ppx

## 2025-03-17 NOTE — PROGRESS NOTE ADULT - PROBLEM SELECTOR PLAN 7
Previous admission in Jan- Feb 2025 for R mini chop cycle 2, but chemo held at the time due to AHRF c/f PJP PNA, completed atovaquone/pred course    Plan:  -c/w bactrim DS daily for PJP ppx BP in 140s systolic in ED. Per rehab med list pt is on Carvedilol 12.5 mg BID, clonidine 0.1 mg TID, hydralazine 100 mg q8    Plan:  -c/w clonidine 0.1 mg TID w hold parameters to prevent rebound HTN  -Coreg 12.5 mg BID on hold  -resume home hydralazine

## 2025-03-17 NOTE — PROGRESS NOTE ADULT - PROBLEM SELECTOR PLAN 1
Staph hominis both bottles 3/13.  -f/u repeat blood cx   Transplant ID eval appreciated --> c/w dapto  TTE --> was unable to r/o endocarditis --> erasto d/c ID about need for BELLA given Coag neg staph Staph hominis both bottles 3/13. Staph epi, staph hemolyticus  -f/u repeat blood cx (contamination? on first draw)  Transplant ID eval appreciated --> c/w dapto. Monitor CK weekly  TTE --> was unable to r/o endocarditis --> erasto d/c ID about need for BELLA given Coag neg staph

## 2025-03-17 NOTE — PROGRESS NOTE ADULT - SUBJECTIVE AND OBJECTIVE BOX
Transplant ID follow up     Afebrile, WBC 10  Gi PCr with Campylobacter from 3/14 BC        ICU Vital Signs Last 24 Hrs  T(C): 36.6 (17 Mar 2025 12:05), Max: 36.9 (17 Mar 2025 05:43)  T(F): 97.9 (17 Mar 2025 12:05), Max: 98.4 (17 Mar 2025 05:43)  HR: 68 (17 Mar 2025 16:00) (62 - 68)  BP: 159/80 (17 Mar 2025 16:00) (154/86 - 183/81)  BP(mean): --  ABP: --  ABP(mean): --  RR: 16 (17 Mar 2025 12:05) (16 - 18)  SpO2: 100% (17 Mar 2025 12:05) (98% - 100%)    O2 Parameters below as of 17 Mar 2025 12:05  Patient On (Oxygen Delivery Method): room air      PHYSICAL EXAM:  Constitutional:no acute distress  Eyes:TRACI, EOMI  Ear/Nose/Throat: no oral lesions, 	  Respiratory: clear BL  Cardiovascular: S1S2  Gastrointestinal:soft, (+) BS, no tenderness  Extremities:no e/e/c  No Lymphadenopathy  IV sites not inflammed.    LABS:                MICROBIOLOGY:      GI PCR Panel Stool (03.14.25 @ 13:05)    GI PCR Panel: Detected: GI Panel PCR evaluates for:  Campylobacter, Plesiomonas shigelloides, Salmonella, Vibrio, Yersinia  enterocolitica, Enteroaggregative Escherichia (EAEC), Enteropathogenic E.  coli (EPEC), Enterotoxigenic E. coli (ETEC), Shiga-like toxin producing  E.coli (STEC), E. coli O157, Shigella/Enteroinvasive E. coli (EIEC),  Adenovirus, Astrovirus, Norovirus, Rotavirus, Sapovirus, Cryptosporidium,  Cyclospora cayetanensis, Entamoeba histolytica, Giardia lamblia.  For culture and susceptibility reports refer to "reflex stool culture".   Campylobacter: Detected: Antibiotic treatment for Campylobacter may be indicated for severe  infections and high-risk patients; please contact the lab if antibiotic  testing is needed.          RECENT CULTURES:  03-13 @ 16:08  Catheterized Catheterized  --  --  --    10,000 - 49,000 CFU/mL Candida albicans  "Susceptibilities not performed"  --  03-13 @ 15:06  Blood Blood-Peripheral  --  --  --    Growth in aerobic and anaerobic bottles: Staphylococcus hominis  Growth in anaerobic bottle: Staphylococcus epidermidis  "Susceptibilities not performed"  --  03-13 @ 15:01  Blood Blood-Peripheral  Blood Culture PCR  Staphylococcus haemolyticus  Staphylococcus hominis  Blood Culture PCR  PCR    Growth in aerobic bottle: Staphylococcus haemolyticus  Growth in aerobic bottle: Staphylococcus hominis  Direct identification is available within approximately 3-5  hours either by Blood Panel Multiplexed PCR or Direct  MALDI-TOF. Details: [email or web address]  --      RADIOLOGY & ADDITIONAL STUDIES:    < from: CT Abdomen and Pelvis w/ IV Cont (03.13.25 @ 17:26) >  IMPRESSION:  Diffuse rectal and sigmoid wall likely due to a severe proctocolitis.    Mild bladder wall thickening with adjacent fat stranding, likely due to   cystitis.    Low-attenuation lesions within the spleen and central liver, decreased in   size from 12/9/2024 compatible positive response totreatment.    Unchanged retroperitoneal soft tissue encasing the aorta and IVC, which   could be due to post transplant lymphoproliferative disease given the   history of prior kidney transplant or retroperitoneal fibrosis.    Persistent lucency and heterogeneity within the sacrum, which could be   due to the patient's lymphoma, though ostium colitis could have a similar   appearance in the appropriate clinical setting.    < end of copied text >

## 2025-03-17 NOTE — PROGRESS NOTE ADULT - PROBLEM SELECTOR PLAN 4
Likely due to acute illness, sepsis and encephalopathy. Continue management as above. Calm today  start zyprexa 2.5mg prn  seroquel 25mg qhs

## 2025-03-17 NOTE — PROGRESS NOTE ADULT - SUBJECTIVE AND OBJECTIVE BOX
Patient is a 69y old  Male who presents with a chief complaint of Toxic metabolic encephalopathy, sepsis 2/2 UTI (16 Mar 2025 16:15)        SUBJECTIVE / OVERNIGHT EVENTS: Patient had no acute events overnight. Patient seen and examined at bedside this morning. Aware he is in the hospital. Denies pain or sob. Unsure when his last bm is. Having lunch    ROS: [ - ] Fever [ - ] Chills [ - ] Nausea/Vomiting [ - ] Chest Pain [ - ] Shortness of breath     MEDICATIONS  (STANDING):  aspirin enteric coated 81 milliGRAM(s) Oral daily  azithromycin   Tablet 500 milliGRAM(s) Oral every 24 hours  buPROPion XL (24-Hour) . 300 milliGRAM(s) Oral daily  chlorhexidine 2% Cloths 1 Application(s) Topical <User Schedule>  cloNIDine 0.1 milliGRAM(s) Oral three times a day  DAPTOmycin IVPB      DAPTOmycin IVPB 650 milliGRAM(s) IV Intermittent every 24 hours  dextrose 5%. 1000 milliLiter(s) (100 mL/Hr) IV Continuous <Continuous>  dextrose 5%. 1000 milliLiter(s) (50 mL/Hr) IV Continuous <Continuous>  dextrose 50% Injectable 25 Gram(s) IV Push once  dextrose 50% Injectable 12.5 Gram(s) IV Push once  dextrose 50% Injectable 25 Gram(s) IV Push once  ertapenem  IVPB 1000 milliGRAM(s) IV Intermittent every 24 hours  escitalopram 10 milliGRAM(s) Oral daily  glucagon  Injectable 1 milliGRAM(s) IntraMuscular once  heparin   Injectable 5000 Unit(s) SubCutaneous every 8 hours  hydrALAZINE 100 milliGRAM(s) Oral every 8 hours  insulin glargine Injectable (LANTUS) 12 Unit(s) SubCutaneous at bedtime  insulin glargine Injectable (LANTUS) 5 Unit(s) SubCutaneous every morning  insulin lispro (ADMELOG) corrective regimen sliding scale   SubCutaneous three times a day before meals  insulin lispro (ADMELOG) corrective regimen sliding scale   SubCutaneous at bedtime  insulin lispro Injectable (ADMELOG) 5 Unit(s) SubCutaneous three times a day before meals  iron sucrose IVPB 200 milliGRAM(s) IV Intermittent every 24 hours  lactated ringers. 1000 milliLiter(s) (75 mL/Hr) IV Continuous <Continuous>  levothyroxine 88 MICROGram(s) Oral daily  melatonin 3 milliGRAM(s) Oral at bedtime  pantoprazole    Tablet 40 milliGRAM(s) Oral before breakfast  polyethylene glycol 3350 17 Gram(s) Oral two times a day  predniSONE   Tablet 5 milliGRAM(s) Oral daily  QUEtiapine 25 milliGRAM(s) Oral at bedtime  rosuvastatin 10 milliGRAM(s) Oral at bedtime  senna 2 Tablet(s) Oral at bedtime  tacrolimus 1 milliGRAM(s) Oral two times a day  trimethoprim  160 mG/sulfamethoxazole 800 mG 1 Tablet(s) Oral daily  valACYclovir 500 milliGRAM(s) Oral every 12 hours    MEDICATIONS  (PRN):  acetaminophen     Tablet .. 1000 milliGRAM(s) Oral every 6 hours PRN Temp greater or equal to 38C (100.4F), Mild Pain (1 - 3)  aluminum hydroxide/magnesium hydroxide/simethicone Suspension 30 milliLiter(s) Oral every 4 hours PRN Dyspepsia  dextrose Oral Gel 15 Gram(s) Oral once PRN Blood Glucose LESS THAN 70 milliGRAM(s)/deciliter  OLANZapine Injectable 2.5 milliGRAM(s) IntraMuscular every 6 hours PRN for agitation  ondansetron Injectable 4 milliGRAM(s) IV Push every 8 hours PRN Nausea and/or Vomiting      Vital Signs Last 24 Hrs  T(C): 36.6 (17 Mar 2025 12:05), Max: 36.9 (17 Mar 2025 05:43)  T(F): 97.9 (17 Mar 2025 12:05), Max: 98.4 (17 Mar 2025 05:43)  HR: 68 (17 Mar 2025 16:00) (62 - 68)  BP: 159/80 (17 Mar 2025 16:00) (154/86 - 183/81)  BP(mean): --  RR: 16 (17 Mar 2025 12:05) (16 - 18)  SpO2: 100% (17 Mar 2025 12:05) (98% - 100%)    Parameters below as of 17 Mar 2025 12:05  Patient On (Oxygen Delivery Method): room air      CAPILLARY BLOOD GLUCOSE      POCT Blood Glucose.: 244 mg/dL (17 Mar 2025 12:26)  POCT Blood Glucose.: 271 mg/dL (17 Mar 2025 08:29)  POCT Blood Glucose.: 375 mg/dL (16 Mar 2025 21:18)  POCT Blood Glucose.: 411 mg/dL (16 Mar 2025 16:59)    I&O's Summary    16 Mar 2025 07:01  -  17 Mar 2025 07:00  --------------------------------------------------------  IN: 530 mL / OUT: 800 mL / NET: -270 mL    17 Mar 2025 07:01  -  17 Mar 2025 16:10  --------------------------------------------------------  IN: 0 mL / OUT: 610 mL / NET: -610 mL        PHYSICAL EXAM  GENERAL: NAD, lying comfortably in bed   HEENT:  Atraumatic, Normocephalic, EOMI, conjunctiva and sclera clear, no LAD  CHEST/LUNG: Clear to auscultation bilaterally; No wheeze  HEART: RRR, S1 and S2 No murmurs, rubs, or gallops  ABDOMEN: Soft, Nontender, Nondistended; Bowel sounds present  EXTREMITIES:  2+ Peripheral Pulses, No clubbing, cyanosis, or edema  NEURO: AAOx1-2 (hospital and name), non-focal  SKIN: No rashes or lesions    LABS:    03-17    138  |  104  |  16  ----------------------------<  202[H]  4.3   |  22  |  0.86    Ca    9.7      17 Mar 2025 06:31            Urinalysis Basic - ( 17 Mar 2025 06:31 )    Color: x / Appearance: x / SG: x / pH: x  Gluc: 202 mg/dL / Ketone: x  / Bili: x / Urobili: x   Blood: x / Protein: x / Nitrite: x   Leuk Esterase: x / RBC: x / WBC x   Sq Epi: x / Non Sq Epi: x / Bacteria: x          RADIOLOGY & ADDITIONAL TESTS:      Labs Personally Reviewed  Imaging Personally Reviewed  Consultant(s) Notes Reviewed

## 2025-03-17 NOTE — PROGRESS NOTE ADULT - PROBLEM SELECTOR PLAN 9
Hypothyroidism on levothyroxine 88 mcg    Plan:  -c/w home levothyroxine 88 mcg  -f/u TSH and free T4 Hx of Type 2 DM (A1c 8.4% in Jan 2025) and steroid induced hyperglycemia during PJP tx course. Per rehab records, pt is on 20 units glargine in PM and 10 units glargine in AM and Admelog 24/18/14 premeals    Plan:  -  12 units glargine in PM and 5 units in AM + low dose ISS in setting of infection, consider moderate dose ISS if poor control  - monitor FS TIDAC and bedtime  -A1c 10- endocrine emailed  - consistent carb diet

## 2025-03-17 NOTE — PROGRESS NOTE ADULT - PROBLEM SELECTOR PLAN 5
Plan:  -c/w tacro 1 mg BID and f/u tacro level before AM dose  - holding mycophenalate  - consulted transplant nephrology   -c/w valacyclovir 500 mg q12 for ppx  -c/w bactrim DS ppx Likely due to acute illness, sepsis and encephalopathy. Continue management as above.

## 2025-03-17 NOTE — PROGRESS NOTE ADULT - PROBLEM SELECTOR PLAN 3
Pt is more agitated today.  start zyprexa 2.5mg prn  seroquel 25mg qhs -on azithryomycin for camylobacter GI PCR. Monior stools  -CMV neg  -HSV and EBV ordered.

## 2025-03-18 LAB
ALBUMIN SERPL ELPH-MCNC: 2.9 G/DL — LOW (ref 3.3–5)
ALP SERPL-CCNC: 91 U/L — SIGNIFICANT CHANGE UP (ref 40–120)
ALT FLD-CCNC: 23 U/L — SIGNIFICANT CHANGE UP (ref 10–45)
ANION GAP SERPL CALC-SCNC: 10 MMOL/L — SIGNIFICANT CHANGE UP (ref 5–17)
AST SERPL-CCNC: 16 U/L — SIGNIFICANT CHANGE UP (ref 10–40)
BILIRUB SERPL-MCNC: 0.2 MG/DL — SIGNIFICANT CHANGE UP (ref 0.2–1.2)
BKV DNA SPEC QL NAA+PROBE: SIGNIFICANT CHANGE UP
BUN SERPL-MCNC: 13 MG/DL — SIGNIFICANT CHANGE UP (ref 7–23)
CALCIUM SERPL-MCNC: 9.5 MG/DL — SIGNIFICANT CHANGE UP (ref 8.4–10.5)
CHLORIDE SERPL-SCNC: 103 MMOL/L — SIGNIFICANT CHANGE UP (ref 96–108)
CK SERPL-CCNC: 14 U/L — LOW (ref 30–200)
CO2 SERPL-SCNC: 24 MMOL/L — SIGNIFICANT CHANGE UP (ref 22–31)
EGFR: 99 ML/MIN/1.73M2 — SIGNIFICANT CHANGE UP
EGFR: 99 ML/MIN/1.73M2 — SIGNIFICANT CHANGE UP
FUNGITELL: 392 PG/ML — HIGH
GLUCOSE BLDC GLUCOMTR-MCNC: 252 MG/DL — HIGH (ref 70–99)
GLUCOSE BLDC GLUCOMTR-MCNC: 293 MG/DL — HIGH (ref 70–99)
GLUCOSE BLDC GLUCOMTR-MCNC: 309 MG/DL — HIGH (ref 70–99)
GLUCOSE BLDC GLUCOMTR-MCNC: 344 MG/DL — HIGH (ref 70–99)
GLUCOSE SERPL-MCNC: 342 MG/DL — HIGH (ref 70–99)
HCT VFR BLD CALC: 29.5 % — LOW (ref 39–50)
HGB BLD-MCNC: 9.3 G/DL — LOW (ref 13–17)
HSV 1/2 SOURCE: SIGNIFICANT CHANGE UP
HSV1 DNA BLD QL NAA+PROBE: SIGNIFICANT CHANGE UP
HSV2 DNA BLD QL NAA+PROBE: SIGNIFICANT CHANGE UP
LDH SERPL L TO P-CCNC: 153 U/L — SIGNIFICANT CHANGE UP (ref 50–242)
MAGNESIUM SERPL-MCNC: 1.4 MG/DL — LOW (ref 1.6–2.6)
MCHC RBC-ENTMCNC: 30.7 PG — SIGNIFICANT CHANGE UP (ref 27–34)
MCV RBC AUTO: 97.4 FL — SIGNIFICANT CHANGE UP (ref 80–100)
NRBC BLD AUTO-RTO: 0 /100 WBCS — SIGNIFICANT CHANGE UP (ref 0–0)
PLATELET # BLD AUTO: 267 K/UL — SIGNIFICANT CHANGE UP (ref 150–400)
POTASSIUM SERPL-MCNC: 4.3 MMOL/L — SIGNIFICANT CHANGE UP (ref 3.5–5.3)
POTASSIUM SERPL-SCNC: 4.3 MMOL/L — SIGNIFICANT CHANGE UP (ref 3.5–5.3)
PROT SERPL-MCNC: 5.9 G/DL — LOW (ref 6–8.3)
RBC # BLD: 3.03 M/UL — LOW (ref 4.2–5.8)
RBC # FLD: 16 % — HIGH (ref 10.3–14.5)
SODIUM SERPL-SCNC: 137 MMOL/L — SIGNIFICANT CHANGE UP (ref 135–145)
TACROLIMUS SERPL-MCNC: 2.7 NG/ML — SIGNIFICANT CHANGE UP
WBC # BLD: 5.74 K/UL — SIGNIFICANT CHANGE UP (ref 3.8–10.5)
WBC # FLD AUTO: 5.74 K/UL — SIGNIFICANT CHANGE UP (ref 3.8–10.5)

## 2025-03-18 PROCEDURE — 72195 MRI PELVIS W/O DYE: CPT | Mod: 26

## 2025-03-18 PROCEDURE — 99232 SBSQ HOSP IP/OBS MODERATE 35: CPT | Mod: GC

## 2025-03-18 PROCEDURE — G0545: CPT

## 2025-03-18 PROCEDURE — 99233 SBSQ HOSP IP/OBS HIGH 50: CPT

## 2025-03-18 PROCEDURE — 99223 1ST HOSP IP/OBS HIGH 75: CPT

## 2025-03-18 RX ORDER — ENOXAPARIN SODIUM 100 MG/ML
40 INJECTION SUBCUTANEOUS EVERY 24 HOURS
Refills: 0 | Status: DISCONTINUED | OUTPATIENT
Start: 2025-03-18 | End: 2025-03-21

## 2025-03-18 RX ORDER — INSULIN GLARGINE-YFGN 100 [IU]/ML
8 INJECTION, SOLUTION SUBCUTANEOUS EVERY MORNING
Refills: 0 | Status: DISCONTINUED | OUTPATIENT
Start: 2025-03-18 | End: 2025-03-18

## 2025-03-18 RX ORDER — VANCOMYCIN HCL IN 5 % DEXTROSE 1.5G/250ML
1000 PLASTIC BAG, INJECTION (ML) INTRAVENOUS EVERY 12 HOURS
Refills: 0 | Status: DISCONTINUED | OUTPATIENT
Start: 2025-03-18 | End: 2025-03-21

## 2025-03-18 RX ORDER — INSULIN GLARGINE-YFGN 100 [IU]/ML
18 INJECTION, SOLUTION SUBCUTANEOUS AT BEDTIME
Refills: 0 | Status: DISCONTINUED | OUTPATIENT
Start: 2025-03-18 | End: 2025-03-19

## 2025-03-18 RX ORDER — TACROLIMUS 0.5 MG/1
1.5 CAPSULE ORAL
Refills: 0 | Status: DISCONTINUED | OUTPATIENT
Start: 2025-03-18 | End: 2025-03-21

## 2025-03-18 RX ORDER — MAGNESIUM SULFATE 500 MG/ML
1 SYRINGE (ML) INJECTION ONCE
Refills: 0 | Status: COMPLETED | OUTPATIENT
Start: 2025-03-18 | End: 2025-03-18

## 2025-03-18 RX ORDER — INSULIN LISPRO 100 U/ML
10 INJECTION, SOLUTION INTRAVENOUS; SUBCUTANEOUS
Refills: 0 | Status: DISCONTINUED | OUTPATIENT
Start: 2025-03-18 | End: 2025-03-19

## 2025-03-18 RX ORDER — INSULIN LISPRO 100 U/ML
8 INJECTION, SOLUTION INTRAVENOUS; SUBCUTANEOUS
Refills: 0 | Status: DISCONTINUED | OUTPATIENT
Start: 2025-03-18 | End: 2025-03-18

## 2025-03-18 RX ADMIN — Medication 500 MILLIGRAM(S): at 17:15

## 2025-03-18 RX ADMIN — Medication 0.1 MILLIGRAM(S): at 13:26

## 2025-03-18 RX ADMIN — Medication 100 MILLIGRAM(S): at 21:15

## 2025-03-18 RX ADMIN — TACROLIMUS 1 MILLIGRAM(S): 0.5 CAPSULE ORAL at 08:58

## 2025-03-18 RX ADMIN — Medication 250 MILLIGRAM(S): at 20:06

## 2025-03-18 RX ADMIN — Medication 2 TABLET(S): at 21:17

## 2025-03-18 RX ADMIN — INSULIN LISPRO 10 UNIT(S): 100 INJECTION, SOLUTION INTRAVENOUS; SUBCUTANEOUS at 12:28

## 2025-03-18 RX ADMIN — HEPARIN SODIUM 5000 UNIT(S): 1000 INJECTION INTRAVENOUS; SUBCUTANEOUS at 13:27

## 2025-03-18 RX ADMIN — ESCITALOPRAM OXALATE 10 MILLIGRAM(S): 20 TABLET ORAL at 08:58

## 2025-03-18 RX ADMIN — IRON SUCROSE 110 MILLIGRAM(S): 20 INJECTION, SOLUTION INTRAVENOUS at 20:07

## 2025-03-18 RX ADMIN — HEPARIN SODIUM 5000 UNIT(S): 1000 INJECTION INTRAVENOUS; SUBCUTANEOUS at 06:34

## 2025-03-18 RX ADMIN — Medication 100 GRAM(S): at 12:29

## 2025-03-18 RX ADMIN — INSULIN LISPRO 8 UNIT(S): 100 INJECTION, SOLUTION INTRAVENOUS; SUBCUTANEOUS at 08:58

## 2025-03-18 RX ADMIN — Medication 100 MILLIGRAM(S): at 13:26

## 2025-03-18 RX ADMIN — POLYETHYLENE GLYCOL 3350 17 GRAM(S): 17 POWDER, FOR SOLUTION ORAL at 06:31

## 2025-03-18 RX ADMIN — Medication 1 TABLET(S): at 12:28

## 2025-03-18 RX ADMIN — Medication 40 MILLIGRAM(S): at 06:32

## 2025-03-18 RX ADMIN — INSULIN LISPRO 8: 100 INJECTION, SOLUTION INTRAVENOUS; SUBCUTANEOUS at 08:59

## 2025-03-18 RX ADMIN — INSULIN LISPRO 2: 100 INJECTION, SOLUTION INTRAVENOUS; SUBCUTANEOUS at 22:09

## 2025-03-18 RX ADMIN — INSULIN GLARGINE-YFGN 18 UNIT(S): 100 INJECTION, SOLUTION SUBCUTANEOUS at 22:09

## 2025-03-18 RX ADMIN — Medication 88 MICROGRAM(S): at 06:33

## 2025-03-18 RX ADMIN — Medication 100 MILLIGRAM(S): at 06:32

## 2025-03-18 RX ADMIN — Medication 0.1 MILLIGRAM(S): at 21:15

## 2025-03-18 RX ADMIN — POLYETHYLENE GLYCOL 3350 17 GRAM(S): 17 POWDER, FOR SOLUTION ORAL at 20:11

## 2025-03-18 RX ADMIN — Medication 81 MILLIGRAM(S): at 08:58

## 2025-03-18 RX ADMIN — INSULIN LISPRO 6: 100 INJECTION, SOLUTION INTRAVENOUS; SUBCUTANEOUS at 12:28

## 2025-03-18 RX ADMIN — Medication 1 APPLICATION(S): at 06:34

## 2025-03-18 RX ADMIN — BUPROPION HYDROBROMIDE 300 MILLIGRAM(S): 522 TABLET, EXTENDED RELEASE ORAL at 08:58

## 2025-03-18 RX ADMIN — INSULIN LISPRO 8: 100 INJECTION, SOLUTION INTRAVENOUS; SUBCUTANEOUS at 17:14

## 2025-03-18 RX ADMIN — TACROLIMUS 1.5 MILLIGRAM(S): 0.5 CAPSULE ORAL at 21:16

## 2025-03-18 RX ADMIN — QUETIAPINE FUMARATE 25 MILLIGRAM(S): 25 TABLET ORAL at 21:17

## 2025-03-18 RX ADMIN — ROSUVASTATIN CALCIUM 10 MILLIGRAM(S): 20 TABLET, FILM COATED ORAL at 21:15

## 2025-03-18 RX ADMIN — Medication 3 MILLIGRAM(S): at 21:16

## 2025-03-18 RX ADMIN — Medication 0.1 MILLIGRAM(S): at 06:32

## 2025-03-18 RX ADMIN — Medication 500 MILLIGRAM(S): at 06:32

## 2025-03-18 RX ADMIN — PREDNISONE 5 MILLIGRAM(S): 20 TABLET ORAL at 06:32

## 2025-03-18 RX ADMIN — INSULIN LISPRO 10 UNIT(S): 100 INJECTION, SOLUTION INTRAVENOUS; SUBCUTANEOUS at 17:14

## 2025-03-18 NOTE — CONSULT NOTE ADULT - REASON FOR ADMISSION
Toxic metabolic encephalopathy, sepsis 2/2 UTI

## 2025-03-18 NOTE — PROGRESS NOTE ADULT - ASSESSMENT
69 year old M with a history of renal transplant (2012) on tacrolimus, s/p left nephrectomy, DLBCL s/p C1 R mini CHOP (chemo held 2/2 recent infections), peripheral neuropathy, hypothyroidism, retroperitoneal fibrosis encasing veins, HTN, HLD, GERD, anxiety/depression, ANGELIKA and recent admission at [facility] (1/17 - 2/6/25) for sepsis 2/2 Klebsiella pneumoniae ESBL UTI and AHRF c/f PJP pneumonia (s/p completion of atovaquone and pred course) presenting after unwitnessed fall at his rehab facility.     On assessment patient oriented to self and place but incoherent and verbally aggressive w tangential thoughts, refusing exam and assessment and unable to answer history questions. Details regarding the fall are limited as collateral information limited. Attempted to call patient's wife who is the emergency contact but was not able to reach. Patient's son was contacted who reports his father "acts this way" when he has infections but was unsure about preceding events.     On arrival febrile to 100.7 rectally, /65, HR 88, RR 18, Sat 100% on RA. RVP neg. UA w mod leuk esterase, neg nitrite, 0-2 wbc. CT head/ c spine neg for acute territorial infarct, hematoma or mass effect. No acute fracture or subluxation of the cervical spine. CT A/P with w severe proctocolitis and cystitis. Received Vanc and zosyn x1, 1 L LR bolus,     Ct C/A/p 3/13 diffusese rectal and sigmoid wall likely due to a severe proctocolitis.Mild bladder wall thickening with adjacent fat stranding, likely due to cystitis.  Low-attenuation lesions within the spleen and central liver, decreased in size from 12/9/2024 compatible positive response to treatment.  Unchanged retroperitoneal soft tissue encasing the aorta and IVC, which could be due to post transplant lymphoproliferative disease given the history of prior kidney transplant or retroperitoneal fibrosis.  Persistent lucency and heterogeneity within the sacrum, which could be due to the patient's lymphoma, though ostium colitis could have a similar appearance in the appropriate clinical setting.  CT chest non-con 3/17: Near complete resolution prior bilateral lung opacities. Small left pleural effusion associated left pleural thickening with left lower lobe atelectasis/round atelectasis.      # s/p renal transplant 2012   # s/p left nephrectomy  # DLBCL  s/p C1 R mini CHOP (chemo held 2/2 recent infections)  # recent Multifocal pneumonia 1/2025 and fungitell positive treated empirically for PCP  # history of multiple prior transplant related UTIs  # high grade coag negative staph bacteremia  3/4 bottles S hominis, marleny 1/4 S haemolyticus and 1/4 S epi    BC 3/13 pos, Repeat BC 3/16 NGTD  vancomycin 3/13--> daptomycin 3/14-   # candiduria  UA 3/13 0-2 WBC no RBC  UC 3/13 catheterized 10-50K c albicans  # proctocolitis on Ct, Gi PCr positive for   # campylobacter   CMv PCr neg  EBV PCr, pending collection  # erosive changes of the sacrum with no sacral wound 1/23 on Ct A/P      Recommendations:    -UA blunt, UC with candiduria in c.o catheterized urine- pelase repeat microscopic UA and UC, for now, holding off on antifungals unless repeat testing positive  -Gi PCr positive for Campylobacter- ertapenem provides coverage so he has been effectively treated for this. S/p 5 days of ertapenem  -if diarrhea recurs, will need to consider colonoscopy  -Last admission with noted ? erosive sacral changes on Ct- given now with questionable bacteremia with CNS staph, favor MRI sacrum and sending ESR/CRP at baseline  -continue CNStaph coverage pending repeat BC 3/16-  revise dapto to  vancomycin 1 g q12h  for isolated CNstaph.   -send fungitell and LDH, aspergillus GM to ensure trended down after therapy for PCP  -Continue bactrim ppx lifelong    Case discussed with Dr. Martin Cheatham DO, PGY-4  Infectious Disease Fellow  [employer] Teams Preferred  After 5pm/weekends call [phone]       69 year old M with a history of renal transplant (2012) on tacrolimus, s/p left nephrectomy, DLBCL s/p C1 R mini CHOP (chemo held 2/2 recent infections), peripheral neuropathy, hypothyroidism, retroperitoneal fibrosis encasing veins, HTN, HLD, GERD, anxiety/depression, ANGELIKA and recent admission at [facility] (1/17 - 2/6/25) for sepsis 2/2 Klebsiella pneumoniae ESBL UTI and AHRF c/f PJP pneumonia (s/p completion of atovaquone and pred course) presenting after unwitnessed fall at his rehab facility.     On assessment patient oriented to self and place but incoherent and verbally aggressive w tangential thoughts, refusing exam and assessment and unable to answer history questions. Details regarding the fall are limited as collateral information limited. Attempted to call patient's wife who is the emergency contact but was not able to reach. Patient's son was contacted who reports his father "acts this way" when he has infections but was unsure about preceding events.     On arrival febrile to 100.7 rectally, /65, HR 88, RR 18, Sat 100% on RA. RVP neg. UA w mod leuk esterase, neg nitrite, 0-2 wbc. CT head/ c spine neg for acute territorial infarct, hematoma or mass effect. No acute fracture or subluxation of the cervical spine. CT A/P with w severe proctocolitis and cystitis. Received Vanc and zosyn x1, 1 L LR bolus,     Ct C/A/p 3/13 diffusese rectal and sigmoid wall likely due to a severe proctocolitis.Mild bladder wall thickening with adjacent fat stranding, likely due to cystitis.  Low-attenuation lesions within the spleen and central liver, decreased in size from 12/9/2024 compatible positive response to treatment.  Unchanged retroperitoneal soft tissue encasing the aorta and IVC, which could be due to post transplant lymphoproliferative disease given the history of prior kidney transplant or retroperitoneal fibrosis.  Persistent lucency and heterogeneity within the sacrum, which could be due to the patient's lymphoma, though ostium colitis could have a similar appearance in the appropriate clinical setting.  CT chest non-con 3/17: Near complete resolution prior bilateral lung opacities. Small left pleural effusion associated left pleural thickening with left lower lobe atelectasis/round atelectasis.      # s/p renal transplant 2012   # s/p left nephrectomy  # DLBCL  s/p C1 R mini CHOP (chemo held 2/2 recent infections)  # recent Multifocal pneumonia 1/2025 and fungitell positive treated empirically for PCP  # history of multiple prior transplant related UTIs  # high grade coag negative staph bacteremia  3/4 bottles S hominis, marleny 1/4 S haemolyticus and 1/4 S epi    BC 3/13 pos, Repeat BC 3/16 NGTD  vancomycin 3/13--> daptomycin 3/14-   # candiduria  UA 3/13 0-2 WBC no RBC  UC 3/13 catheterized 10-50K c albicans  # proctocolitis on Ct, Gi PCr positive for   # campylobacter   CMv PCr neg  EBV PCr, pending collection  # erosive changes of the sacrum with no sacral wound 1/23 on Ct A/P      Recommendations:    -UA blunt, UC with candiduria in c.o catheterized urine- please repeat microscopic UA and UC, for now, holding off on antifungals unless repeat testing positive  -Gi PCr positive for Campylobacter- ertapenem provides coverage so he has been effectively treated for this. S/p 5 days of ertapenem  -if diarrhea recurs, will need to consider colonoscopy  -Last admission with noted ? erosive sacral changes on Ct- given now with questionable bacteremia with CNS staph, favor MRI sacrum and sending ESR/CRP at baseline  -continue CNStaph coverage pending repeat BC 3/16-  revise dapto to  vancomycin 1 g q12h  for isolated CNstaph.   -send fungitell and LDH, aspergillus GM to ensure trended down after therapy for PCP  -Continue bactrim ppx lifelong    Case discussed with Dr. Ellie Cheatham DO, PGY-4  Infectious Disease Fellow  [employer] Teams Preferred  After 5pm/weekends call [phone]

## 2025-03-18 NOTE — CONSULT NOTE ADULT - ASSESSMENT
69 year old M with a history of renal transplant (2012) on tacrolimus, s/p left nephrectomy, DLBCL s/p C1 R mini CHOP (chemo held 2/2 recent infections), peripheral neuropathy, hypothyroidism, retroperitoneal fibrosis encasing veins, HTN, HLD, GERD, anxiety/depression, ANGELIKA and recent admission at [facility] (1/17 - 2/6/25) for sepsis 2/2 Klebsiella pneumoniae ESBL UTI and AHRF c/f PJP pneumonia (s/p completion of atovaquone and pred course) pw unwitnessed fall, in setting of sepsis likely 2/2 UTI and toxic metabolic encephalopathy, CT A/P w severe proctocolitis and cystitis   endocrine called for DM    (high risk patient with severely uncontrolled diabetes with A1c of  10 and glucose values >300s at high risk of CAD and CVA with high level decision-making).          Problem/Recommendation - 1:  ·  Problem: Uncontrolled type 2 diabetes mellitus with hyperglycemia, with long-term current use of insulin.   he states prior to rehab was taking long acting and short acting insulin   Hx of Type 2 DM (A1c 8.4% in Jan 2025) and steroid induced hyperglycemia during PJP tx course.   during last admission in feb 2025  - While on prednisone 20mg daily pt was on Lantus 11 units QHS and Admelog 24 units with breakfast, 18units with lunch and 14 units with dinner,  Per rehab records, pt is on 20 units glargine in PM and 10 units glargine in AM and Admelog 24/18/14 premeals.  it appears that even with less steroids on board at rehab pt has remained on high dose ademelog     prior to feb 2025 admission rehab doses  - Home regimen: Came from rehab, there he was on Lantus 38 units, Admelog 10 units with correction scale      While inpatient  BG target 100-180 mg/dl,   - Please monitor blood glucose values TID AC & QHS while eating regular meals and Q6H while NPO  - Blood glucose goals pre-meal less than 140 mg/dL and random blood glucose less than 180 mg/dL    PLAN:  3/18- intial plan was to give NPH this am- however per d/w nurse delay in order - has not been sent up from [facility]  at this point hold NPH and optimize once a day Lantus - no need for BID LANTUS dosing yet until one lantus dose is optimized - we will work on increasing PM lantus dose at this time   increase ademelog as well   - Lantus   12--->18 units qhs  - Admelog to 10 units SC Premeal/TIDAC   - Admelog  moderate dose Correction Scale Premeal & seperate low dose  Correction Scale Bedtime   - Hypoglycemia protocol in place   - Carb Consistent Diet   - Nutrition consult   - Provider to RN for diabetes/insulin pen teaching     inform endocrine of hypoglycemia or persistent hyperglycemia episodes as changes in pts insulin regimen will need to be made.   notify endocrine if any plans to be NPO/diet changes as this will also affect insulin regimen.      Discharge planning:   basal bolus insulin      Discharge planning:  -Discussed with patient the importance of Carb consistent diet and exercise as tolerated.    -Recommend nutritional consultation  inpt prior to dc   -Discussed glycemic goal of a1c <7% to prevent microvascular complications of diabetes mellitus and reduce the risk of macrovascular complications.  - At home, Patient should check FSBG premeals and bedtime. Pt should call their doctor when FSBG <70 or above >400 and or consistently above 200s as changes in the regimen will have to be made.  - Make sure patient knows how to inject insulin and check fingersticks with glucometer (ask bedside RN for teaching)  -Counseled pt on kinetics and action of basal and prandial insulin. Discussed that pt should check FSBG before meals and ALSO take the prandial insulin BEFORE meals   - BG goals for outpatient 80-130mg/dl, premeal < 140mg/dL, 2 hours postprandial < 180mg/dL   - Hypoglycemia < 70mg/dL; review symptoms & management of hypoglycemia   - discussed importance of follow up care  -pt should call PMD for DM related questions or concerns until pt is seen by CDE or endocrine     -------------------------------------------------------------------------------------------------------------------------------------  DISCHARGE RX:  - Patient would also benefit from CGM for glycemic monitoring when home    Please send Alena 3 sensor and reader to pharmacy to assess insurance coverage  - Ensure patient has working glucometer, test strips, lancets, alcohol pads  -Please ensure patient has the following diabetes supplies:  - Glucometer (ACCU_CHECK devante Connect, Ascensia Contour Next EZ or One, Freestyle Freedome LITE or OneTouch Verio IQ)  - Glucometer test strips and lancets  (make sure compatible w/ glucometer), Dispense #100 (or #200) use as directed  - Lantus Solostar Pen (or Basaglar Kwikpen) ____ units before bedtime (dose TBD)  - Novolog or Humalog Pen  - BD denis 4mm pen needles.   - Please verify with pharmacy that each script is covered before discharge.  - Patient will need opthalmology and podiatry follow up as outpatient     Please also prescribe glucose tabs, Baqsimi nasal spray or glucagon emergency kit for hypoglycemia risk  as follows (when d/c on insulin this is for pt safety in case of hypoglycemia)    - please send Rx for Glucagon kit   - For severe hypoglycemia: Please prescribe Gvoke HypoPen One Pack 1mG/0.2mL subcutaneous solution: 1 mG subcutaneously as needed for severe hypoglycemia or Baqsimi One Pack 3mG nasal powder: 3 mg (one actuation) into a single nostril as needed for severe hypoglycemia. If not covered, please prescribe Glucagon Emergency Kit for Low Blood Sugar 1 mG injection: 1 mG intramuscularly as needed for severe hypoglycemia.   Glucose tablets 4G (take 4 tablets) or 15G tablets for blood sugar less than 70 mG/dL repeat fingerstick in 15 minutes. Patient and family will need instructions on proper use and to call 911 after use.       APPTS NEEDED:  PCP followup to take place, asap. I asked the pt to call PCP and inform them will need close fu for DM   - Recommend routine outpatient ophthalmology, podiatry and endocrinology f/u  pt can call endocrine as follows for appt:  [address]  [place] [address]  Phone Number: [phone]         Problem/Recommendation - 2:  ·  Problem: Essential hypertension.   ·  Recommendation: Goal BP < 130/80  outpt mc/cr ratio         Problem/Recommendation - 3:  ·  Problem: Hyperlipidemia.   check lipid panel   LDL goal <70  given DM would strongly consider statin if no acute contraidncation for CV risk reduction        prob 4. hypothyroid  TSH 4.47 march 13th  for now continue with Levothyroxine 88mcg- make sure it is given 1 hr prior to food and 4 hrs apart from calcium iron or PPI  TSH in 4 weeks outpt         d/w Dr. Leyva and nurse and pt     Rachel Garza MD  Attending Physician   Department of Endocrinology, Diabetes and Metabolism     weekdays: 9am to 5pm: [facility] or [facility] and or TEAMS     Nights and weekends: [phone]  Please note that this patient may be followed by a different provider tomorrow.   If no answer or after hours, please contact [phone].  For final dc reccomendations, please call [phone] or page the endocrine fellow on call.

## 2025-03-18 NOTE — CONSULT NOTE ADULT - CONSULT REASON
DM
LDRT 2012 on immunosuppression
Sepsis in an immunocompromised renal transplant patient
lymphoma care

## 2025-03-18 NOTE — CONSULT NOTE ADULT - SUBJECTIVE AND OBJECTIVE BOX
HEMATOLOGY ONCOLOGY CONSULT     Patient is a 69y old  Male who presents with a chief complaint of Toxic metabolic encephalopathy, sepsis 2/2 UTI (18 Mar 2025 13:04)      HPI:  69 year old M with a history of renal transplant (2012) on tacrolimus, s/p left nephrectomy, DLBCL s/p C1 R mini CHOP (chemo held 2/2 recent infections), peripheral neuropathy, hypothyroidism, retroperitoneal fibrosis encasing veins, HTN, HLD, GERD, anxiety/depression, ANGELIKA and recent admission at [facility] (1/17 - 2/6/25) for sepsis 2/2 Klebsiella pneumoniae ESBL [facility]TI [facility] (s/p completion of atovaquone and pred course) presenting after unwitnessed fall at his rehab facility.     On assessment patient oriented to self and place but incoherent and verbally aggressive w tangential thoughts, refusing exam and assessment and unable to answer history questions. Details regarding the fall are limited as collateral information limited. Attempted to call patient's wife who is the emergency contact but was not able to reach. Patient's son was contacted who reports his father "acts this way" when he has infections but was unsure about preceding events.     On arrival febrile to 100.7 rectally, /65, HR 88, RR 18, Sat 100% on RA. RVP neg. UA w mod leuk esterase, neg nitrite, 0-2 wbc. CT head/ c spine neg for acute territorial infarct, hematoma or mass effect. No acute fracture or subluxation of the cervical spine. CT A/P with w severe proctocolitis and cystitis. Received Vanc and zosyn x1, 1 L LR bolus,    (13 Mar 2025 20:22)       PAST MEDICAL & SURGICAL HISTORY:  Diabetes Mellitus Type II  Insulin pump (2010)      GERD (gastroesophageal reflux disease)      Depression      Hypothyroidism      Hyperlipidemia      Kidney transplanted  2012      Hypertension      ANGELIKA (obstructive sleep apnea)      Peripheral neuropathy      Shoulder fracture  Left.      Hypothyroidism      History of renal transplant      DLBCL (diffuse large B cell lymphoma)      Infectious disease      Type 2 diabetes mellitus      Hypothyroidism      Transplanted kidney      History of colonoscopy      S/P kidney transplant  2012      S/P cholecystectomy      Retroperitoneal fibrosis  s/p surgery      AV fistula  Right arm      S/P right cataract extraction      S/P left cataract extraction      H/O unilateral nephrectomy  Left          FAMILY HISTORY:  MI (myocardial infarction)    Family history of obesity (Sibling)        MEDICATIONS  (STANDING):  aspirin enteric coated 81 milliGRAM(s) Oral daily  buPROPion XL (24-Hour) . 300 milliGRAM(s) Oral daily  chlorhexidine 2% Cloths 1 Application(s) Topical <User Schedule>  cloNIDine 0.1 milliGRAM(s) Oral three times a day  dextrose 5%. 1000 milliLiter(s) (100 mL/Hr) IV Continuous <Continuous>  dextrose 5%. 1000 milliLiter(s) (50 mL/Hr) IV Continuous <Continuous>  dextrose 50% Injectable 25 Gram(s) IV Push once  dextrose 50% Injectable 12.5 Gram(s) IV Push once  dextrose 50% Injectable 25 Gram(s) IV Push once  escitalopram 10 milliGRAM(s) Oral daily  glucagon  Injectable 1 milliGRAM(s) IntraMuscular once  heparin   Injectable 5000 Unit(s) SubCutaneous every 8 hours  hydrALAZINE 100 milliGRAM(s) Oral every 8 hours  insulin glargine Injectable (LANTUS) 18 Unit(s) SubCutaneous at bedtime  insulin lispro (ADMELOG) corrective regimen sliding scale   SubCutaneous three times a day before meals  insulin lispro (ADMELOG) corrective regimen sliding scale   SubCutaneous at bedtime  insulin lispro Injectable (ADMELOG) 10 Unit(s) SubCutaneous three times a day before meals  iron sucrose IVPB 200 milliGRAM(s) IV Intermittent every 24 hours  lactated ringers. 1000 milliLiter(s) (75 mL/Hr) IV Continuous <Continuous>  levothyroxine 88 MICROGram(s) Oral daily  melatonin 3 milliGRAM(s) Oral at bedtime  pantoprazole    Tablet 40 milliGRAM(s) Oral before breakfast  polyethylene glycol 3350 17 Gram(s) Oral two times a day  predniSONE   Tablet 5 milliGRAM(s) Oral daily  QUEtiapine 25 milliGRAM(s) Oral at bedtime  rosuvastatin 10 milliGRAM(s) Oral at bedtime  senna 2 Tablet(s) Oral at bedtime  tacrolimus 1 milliGRAM(s) Oral two times a day  trimethoprim  160 mG/sulfamethoxazole 800 mG 1 Tablet(s) Oral daily  valACYclovir 500 milliGRAM(s) Oral every 12 hours  vancomycin  IVPB 1000 milliGRAM(s) IV Intermittent every 12 hours    MEDICATIONS  (PRN):  acetaminophen     Tablet .. 1000 milliGRAM(s) Oral every 6 hours PRN Temp greater or equal to 38C (100.4F), Mild Pain (1 - 3)  aluminum hydroxide/magnesium hydroxide/simethicone Suspension 30 milliLiter(s) Oral every 4 hours PRN Dyspepsia  dextrose Oral Gel 15 Gram(s) Oral once PRN Blood Glucose LESS THAN 70 milliGRAM(s)/deciliter  OLANZapine Injectable 2.5 milliGRAM(s) IntraMuscular every 6 hours PRN for agitation  ondansetron Injectable 4 milliGRAM(s) IV Push every 8 hours PRN Nausea and/or Vomiting      Allergies    No Known Allergies    Intolerances        Vital Signs Last 24 Hrs  T(C): 36.8 (18 Mar 2025 12:46), Max: 37 (17 Mar 2025 21:06)  T(F): 98.2 (18 Mar 2025 12:46), Max: 98.6 (17 Mar 2025 21:06)  HR: 78 (18 Mar 2025 12:46) (68 - 78)  BP: 162/78 (18 Mar 2025 12:46) (159/80 - 183/72)  BP(mean): --  RR: 18 (18 Mar 2025 12:46) (18 - 19)  SpO2: 99% (18 Mar 2025 12:46) (98% - 99%)    Parameters below as of 18 Mar 2025 12:46  Patient On (Oxygen Delivery Method): room air        PHYSICAL EXAM  General: adult in NAD  HEENT: clear oropharynx, anicteric sclera, pink conjunctiva  Neck: supple  CV: normal S1/S2 with no murmur rubs or gallops  Lungs: positive air movement b/l ant lungs,clear to auscultation, no wheezes, no rales  Abdomen: soft non-tender non-distended, no hepatosplenomegaly  Ext: no clubbing cyanosis or edema  Skin: no rashes and no petechiae  Neuro: alert and oriented X 4, no focal deficits      03-17-25 @ 07:01  -  03-18-25 @ 07:00  --------------------------------------------------------  IN: 0 mL / OUT: 910 mL / NET: -910 mL      LABS:                          9.3    5.74  )-----------( 267      ( 18 Mar 2025 07:07 )             29.5         Mean Cell Volume : 97.4 fl  Mean Cell Hemoglobin : 30.7 pg  Mean Cell Hemoglobin Concentration : 31.5 g/dL  Auto Neutrophil # : x  Auto Lymphocyte # : x  Auto Monocyte # : x  Auto Eosinophil # : x  Auto Basophil # : x  Auto Neutrophil % : x  Auto Lymphocyte % : x  Auto Monocyte % : x  Auto Eosinophil % : x  Auto Basophil % : x      03-18    137  |  103  |  13  ----------------------------<  342[H]  4.3   |  24  |  0.75    Ca    9.5      18 Mar 2025 07:08  Mg     1.4     03-18    TPro  5.9[L]  /  Alb  2.9[L]  /  TBili  0.2  /  DBili  x   /  AST  16  /  ALT  23  /  AlkPhos  91  03-18          Iron - Total Binding Capacity.: 189 ug/dL (03-14 @ 07:08)  Ferritin: 569 ng/mL (03-14 @ 07:08)              BLOOD SMEAR INTERPRETATION:       RADIOLOGY & ADDITIONAL STUDIES:  < from: CT Abdomen and Pelvis w/ IV Cont (03.13.25 @ 17:26) >  Diffuse rectal and sigmoid wall likely due to a severe proctocolitis.    Mild bladder wall thickening with adjacent fat stranding, likely due to   cystitis.    Low-attenuation lesions within the spleen and central liver, decreased in   size from 12/9/2024 compatible positive response totreatment.    Unchanged retroperitoneal soft tissue encasing the aorta and IVC, which   could be due to post transplant lymphoproliferative disease given the   history of prior kidney transplant or retroperitoneal fibrosis.    Persistent lucency and heterogeneity within the sacrum, which could be   due to the patient's lymphoma, though ostium colitis could have a similar   appearance in the appropriate clinical setting.    < end of copied text >       HEMATOLOGY ONCOLOGY CONSULT     Patient is a 69y old  Male who presents with a chief complaint of Toxic metabolic encephalopathy, sepsis 2/2 UTI (18 Mar 2025 13:04)      HPI:  69 year old M with a history of renal transplant (2012) on tacrolimus, s/p left nephrectomy, DLBCL s/p C1 R mini CHOP (chemo held 2/2 recent infections), peripheral neuropathy, hypothyroidism, retroperitoneal fibrosis encasing veins, HTN, HLD, GERD, anxiety/depression, ANGELIKA and recent admission at [facility] (1/17 - 2/6/25) for sepsis 2/2 Klebsiella pneumoniae [facility]BL [facility] (s/p completion of atovaquone and pred course) presenting after unwitnessed fall at his rehab facility.     On assessment patient oriented to self and place but incoherent and verbally aggressive w tangential thoughts, refusing exam and assessment and unable to answer history questions. Details regarding the fall are limited as collateral information limited. Attempted to call patient's wife who is the emergency contact but was not able to reach. Patient's son was contacted who reports his father "acts this way" when he has infections but was unsure about preceding events.     On arrival febrile to 100.7 rectally, /65, HR 88, RR 18, Sat 100% on RA. RVP neg. UA w mod leuk esterase, neg nitrite, 0-2 wbc. CT head/ c spine neg for acute territorial infarct, hematoma or mass effect. No acute fracture or subluxation of the cervical spine. CT A/P with w severe proctocolitis and cystitis. Received Vanc and zosyn x1, 1 L LR bolus,    (13 Mar 2025 20:22)     Pt seen at bedside, states he is comfortable and feels well. He denies pain. Has not gotten out of bed yet.     PAST MEDICAL & SURGICAL HISTORY:  Diabetes Mellitus Type II  Insulin pump (2010)      GERD (gastroesophageal reflux disease)      Depression      Hypothyroidism      Hyperlipidemia      Kidney transplanted  2012      Hypertension      ANGELIKA (obstructive sleep apnea)      Peripheral neuropathy      Shoulder fracture  Left.      Hypothyroidism      History of renal transplant      DLBCL (diffuse large B cell lymphoma)      Infectious disease      Type 2 diabetes mellitus      Hypothyroidism      Transplanted kidney      History of colonoscopy      S/P kidney transplant  2012      S/P cholecystectomy      Retroperitoneal fibrosis  s/p surgery      AV fistula  Right arm      S/P right cataract extraction      S/P left cataract extraction      H/O unilateral nephrectomy  Left          FAMILY HISTORY:  MI (myocardial infarction)    Family history of obesity (Sibling)        MEDICATIONS  (STANDING):  aspirin enteric coated 81 milliGRAM(s) Oral daily  buPROPion XL (24-Hour) . 300 milliGRAM(s) Oral daily  chlorhexidine 2% Cloths 1 Application(s) Topical <User Schedule>  cloNIDine 0.1 milliGRAM(s) Oral three times a day  dextrose 5%. 1000 milliLiter(s) (100 mL/Hr) IV Continuous <Continuous>  dextrose 5%. 1000 milliLiter(s) (50 mL/Hr) IV Continuous <Continuous>  dextrose 50% Injectable 25 Gram(s) IV Push once  dextrose 50% Injectable 12.5 Gram(s) IV Push once  dextrose 50% Injectable 25 Gram(s) IV Push once  escitalopram 10 milliGRAM(s) Oral daily  glucagon  Injectable 1 milliGRAM(s) IntraMuscular once  heparin   Injectable 5000 Unit(s) SubCutaneous every 8 hours  hydrALAZINE 100 milliGRAM(s) Oral every 8 hours  insulin glargine Injectable (LANTUS) 18 Unit(s) SubCutaneous at bedtime  insulin lispro (ADMELOG) corrective regimen sliding scale   SubCutaneous three times a day before meals  insulin lispro (ADMELOG) corrective regimen sliding scale   SubCutaneous at bedtime  insulin lispro Injectable (ADMELOG) 10 Unit(s) SubCutaneous three times a day before meals  iron sucrose IVPB 200 milliGRAM(s) IV Intermittent every 24 hours  lactated ringers. 1000 milliLiter(s) (75 mL/Hr) IV Continuous <Continuous>  levothyroxine 88 MICROGram(s) Oral daily  melatonin 3 milliGRAM(s) Oral at bedtime  pantoprazole    Tablet 40 milliGRAM(s) Oral before breakfast  polyethylene glycol 3350 17 Gram(s) Oral two times a day  predniSONE   Tablet 5 milliGRAM(s) Oral daily  QUEtiapine 25 milliGRAM(s) Oral at bedtime  rosuvastatin 10 milliGRAM(s) Oral at bedtime  senna 2 Tablet(s) Oral at bedtime  tacrolimus 1 milliGRAM(s) Oral two times a day  trimethoprim  160 mG/sulfamethoxazole 800 mG 1 Tablet(s) Oral daily  valACYclovir 500 milliGRAM(s) Oral every 12 hours  vancomycin  IVPB 1000 milliGRAM(s) IV Intermittent every 12 hours    MEDICATIONS  (PRN):  acetaminophen     Tablet .. 1000 milliGRAM(s) Oral every 6 hours PRN Temp greater or equal to 38C (100.4F), Mild Pain (1 - 3)  aluminum hydroxide/magnesium hydroxide/simethicone Suspension 30 milliLiter(s) Oral every 4 hours PRN Dyspepsia  dextrose Oral Gel 15 Gram(s) Oral once PRN Blood Glucose LESS THAN 70 milliGRAM(s)/deciliter  OLANZapine Injectable 2.5 milliGRAM(s) IntraMuscular every 6 hours PRN for agitation  ondansetron Injectable 4 milliGRAM(s) IV Push every 8 hours PRN Nausea and/or Vomiting      Allergies    No Known Allergies    Intolerances        Vital Signs Last 24 Hrs  T(C): 36.8 (18 Mar 2025 12:46), Max: 37 (17 Mar 2025 21:06)  T(F): 98.2 (18 Mar 2025 12:46), Max: 98.6 (17 Mar 2025 21:06)  HR: 78 (18 Mar 2025 12:46) (68 - 78)  BP: 162/78 (18 Mar 2025 12:46) (159/80 - 183/72)  BP(mean): --  RR: 18 (18 Mar 2025 12:46) (18 - 19)  SpO2: 99% (18 Mar 2025 12:46) (98% - 99%)    Parameters below as of 18 Mar 2025 12:46  Patient On (Oxygen Delivery Method): room air        PHYSICAL EXAM  General: adult in NAD, smacking lips  HEENT: clear oropharynx, anicteric sclera, pink conjunctiva  Neck: supple  CV: normal S1/S2 with no murmur rubs or gallops  Lungs: positive air movement b/l ant lungs,clear to auscultation, no wheezes, no rales  Abdomen: soft non-tender non-distended, no hepatosplenomegaly  Ext: no clubbing cyanosis or edema  Skin: no rashes and no petechiae  Neuro: alert and oriented X 2-3 (self, "some hospital", "March 2024")      03-17-25 @ 07:01  -  03-18-25 @ 07:00  --------------------------------------------------------  IN: 0 mL / OUT: 910 mL / NET: -910 mL      LABS:                          9.3    5.74  )-----------( 267      ( 18 Mar 2025 07:07 )             29.5         Mean Cell Volume : 97.4 fl  Mean Cell Hemoglobin : 30.7 pg  Mean Cell Hemoglobin Concentration : 31.5 g/dL  Auto Neutrophil # : x  Auto Lymphocyte # : x  Auto Monocyte # : x  Auto Eosinophil # : x  Auto Basophil # : x  Auto Neutrophil % : x  Auto Lymphocyte % : x  Auto Monocyte % : x  Auto Eosinophil % : x  Auto Basophil % : x      03-18    137  |  103  |  13  ----------------------------<  342[H]  4.3   |  24  |  0.75    Ca    9.5      18 Mar 2025 07:08  Mg     1.4     03-18    TPro  5.9[L]  /  Alb  2.9[L]  /  TBili  0.2  /  DBili  x   /  AST  16  /  ALT  23  /  AlkPhos  91  03-18          Iron - Total Binding Capacity.: 189 ug/dL (03-14 @ 07:08)  Ferritin: 569 ng/mL (03-14 @ 07:08)              BLOOD SMEAR INTERPRETATION:       RADIOLOGY & ADDITIONAL STUDIES:  < from: CT Abdomen and Pelvis w/ IV Cont (03.13.25 @ 17:26) >  Diffuse rectal and sigmoid wall likely due to a severe proctocolitis.    Mild bladder wall thickening with adjacent fat stranding, likely due to   cystitis.    Low-attenuation lesions within the spleen and central liver, decreased in   size from 12/9/2024 compatible positive response totreatment.    Unchanged retroperitoneal soft tissue encasing the aorta and IVC, which   could be due to post transplant lymphoproliferative disease given the   history of prior kidney transplant or retroperitoneal fibrosis.    Persistent lucency and heterogeneity within the sacrum, which could be   due to the patient's lymphoma, though ostium colitis could have a similar   appearance in the appropriate clinical setting.    < end of copied text >       HEMATOLOGY ONCOLOGY CONSULT     Patient is a 69y old  Male who presents with a chief complaint of Toxic metabolic encephalopathy, sepsis 2/2 UTI (18 Mar 2025 13:04)      HPI:  69 year old M with a history of renal transplant (2012) on tacrolimus, s/p left nephrectomy, DLBCL s/p C1 R mini CHOP (chemo held 2/2 recent infections), peripheral neuropathy, hypothyroidism, retroperitoneal fibrosis encasing veins, HTN, HLD, GERD, anxiety/depression, ANGELIKA and recent admission at [facility] (1/17 - 2/6/25) for sepsis 2/2 Klebsiella pneumoniae [facility]BL [facility]/p completion of atovaquone and pred course) presenting after unwitnessed fall at his rehab facility.     On assessment patient oriented to self and place but incoherent and verbally aggressive w tangential thoughts, refusing exam and assessment and unable to answer history questions. Details regarding the fall are limited as collateral information limited. Attempted to call patient's wife who is the emergency contact but was not able to reach. Patient's son was contacted who reports his father "acts this way" when he has infections but was unsure about preceding events.     On arrival febrile to 100.7 rectally, /65, HR 88, RR 18, Sat 100% on RA. RVP neg. UA w mod leuk esterase, neg nitrite, 0-2 wbc. CT head/ c spine neg for acute territorial infarct, hematoma or mass effect. No acute fracture or subluxation of the cervical spine. CT A/P with w severe proctocolitis and cystitis. Received Vanc and zosyn x1, 1 L LR bolus,    (13 Mar 2025 20:22)     Pt seen at bedside, states he is comfortable and feels well. He denies pain. Has not gotten out of bed yet.     PAST MEDICAL & SURGICAL HISTORY:  Diabetes Mellitus Type II  Insulin pump (2010)      GERD (gastroesophageal reflux disease)      Depression      Hypothyroidism      Hyperlipidemia      Kidney transplanted  2012      Hypertension      ANGELIKA (obstructive sleep apnea)      Peripheral neuropathy      Shoulder fracture  Left.      Hypothyroidism      History of renal transplant      DLBCL (diffuse large B cell lymphoma)      Infectious disease      Type 2 diabetes mellitus      Hypothyroidism      Transplanted kidney      History of colonoscopy      S/P kidney transplant  2012      S/P cholecystectomy      Retroperitoneal fibrosis  s/p surgery      AV fistula  Right arm      S/P right cataract extraction      S/P left cataract extraction      H/O unilateral nephrectomy  Left          FAMILY HISTORY:  MI (myocardial infarction)    Family history of obesity (Sibling)        MEDICATIONS  (STANDING):  aspirin enteric coated 81 milliGRAM(s) Oral daily  buPROPion XL (24-Hour) . 300 milliGRAM(s) Oral daily  chlorhexidine 2% Cloths 1 Application(s) Topical <User Schedule>  cloNIDine 0.1 milliGRAM(s) Oral three times a day  dextrose 5%. 1000 milliLiter(s) (100 mL/Hr) IV Continuous <Continuous>  dextrose 5%. 1000 milliLiter(s) (50 mL/Hr) IV Continuous <Continuous>  dextrose 50% Injectable 25 Gram(s) IV Push once  dextrose 50% Injectable 12.5 Gram(s) IV Push once  dextrose 50% Injectable 25 Gram(s) IV Push once  escitalopram 10 milliGRAM(s) Oral daily  glucagon  Injectable 1 milliGRAM(s) IntraMuscular once  heparin   Injectable 5000 Unit(s) SubCutaneous every 8 hours  hydrALAZINE 100 milliGRAM(s) Oral every 8 hours  insulin glargine Injectable (LANTUS) 18 Unit(s) SubCutaneous at bedtime  insulin lispro (ADMELOG) corrective regimen sliding scale   SubCutaneous three times a day before meals  insulin lispro (ADMELOG) corrective regimen sliding scale   SubCutaneous at bedtime  insulin lispro Injectable (ADMELOG) 10 Unit(s) SubCutaneous three times a day before meals  iron sucrose IVPB 200 milliGRAM(s) IV Intermittent every 24 hours  lactated ringers. 1000 milliLiter(s) (75 mL/Hr) IV Continuous <Continuous>  levothyroxine 88 MICROGram(s) Oral daily  melatonin 3 milliGRAM(s) Oral at bedtime  pantoprazole    Tablet 40 milliGRAM(s) Oral before breakfast  polyethylene glycol 3350 17 Gram(s) Oral two times a day  predniSONE   Tablet 5 milliGRAM(s) Oral daily  QUEtiapine 25 milliGRAM(s) Oral at bedtime  rosuvastatin 10 milliGRAM(s) Oral at bedtime  senna 2 Tablet(s) Oral at bedtime  tacrolimus 1 milliGRAM(s) Oral two times a day  trimethoprim  160 mG/sulfamethoxazole 800 mG 1 Tablet(s) Oral daily  valACYclovir 500 milliGRAM(s) Oral every 12 hours  vancomycin  IVPB 1000 milliGRAM(s) IV Intermittent every 12 hours    MEDICATIONS  (PRN):  acetaminophen     Tablet .. 1000 milliGRAM(s) Oral every 6 hours PRN Temp greater or equal to 38C (100.4F), Mild Pain (1 - 3)  aluminum hydroxide/magnesium hydroxide/simethicone Suspension 30 milliLiter(s) Oral every 4 hours PRN Dyspepsia  dextrose Oral Gel 15 Gram(s) Oral once PRN Blood Glucose LESS THAN 70 milliGRAM(s)/deciliter  OLANZapine Injectable 2.5 milliGRAM(s) IntraMuscular every 6 hours PRN for agitation  ondansetron Injectable 4 milliGRAM(s) IV Push every 8 hours PRN Nausea and/or Vomiting      Allergies    No Known Allergies    Intolerances        Vital Signs Last 24 Hrs  T(C): 36.8 (18 Mar 2025 12:46), Max: 37 (17 Mar 2025 21:06)  T(F): 98.2 (18 Mar 2025 12:46), Max: 98.6 (17 Mar 2025 21:06)  HR: 78 (18 Mar 2025 12:46) (68 - 78)  BP: 162/78 (18 Mar 2025 12:46) (159/80 - 183/72)  BP(mean): --  RR: 18 (18 Mar 2025 12:46) (18 - 19)  SpO2: 99% (18 Mar 2025 12:46) (98% - 99%)    Parameters below as of 18 Mar 2025 12:46  Patient On (Oxygen Delivery Method): room air        PHYSICAL EXAM  General: adult in NAD, smacking lips  HEENT: clear oropharynx, anicteric sclera, pink conjunctiva  Neck: supple  CV: normal S1/S2 with no murmur rubs or gallops  Lungs: positive air movement b/l ant lungs,clear to auscultation, no wheezes, no rales  Abdomen: soft non-tender non-distended, no hepatosplenomegaly  Ext: no clubbing cyanosis or edema  Skin: no rashes and no petechiae  Neuro: alert and oriented X 2-3 (self, "some hospital", "March 2024")      03-17-25 @ 07:01  -  03-18-25 @ 07:00  --------------------------------------------------------  IN: 0 mL / OUT: 910 mL / NET: -910 mL      LABS:                          9.3    5.74  )-----------( 267      ( 18 Mar 2025 07:07 )             29.5         Mean Cell Volume : 97.4 fl  Mean Cell Hemoglobin : 30.7 pg  Mean Cell Hemoglobin Concentration : 31.5 g/dL  Auto Neutrophil # : x  Auto Lymphocyte # : x  Auto Monocyte # : x  Auto Eosinophil # : x  Auto Basophil # : x  Auto Neutrophil % : x  Auto Lymphocyte % : x  Auto Monocyte % : x  Auto Eosinophil % : x  Auto Basophil % : x      03-18    137  |  103  |  13  ----------------------------<  342[H]  4.3   |  24  |  0.75    Ca    9.5      18 Mar 2025 07:08  Mg     1.4     03-18    TPro  5.9[L]  /  Alb  2.9[L]  /  TBili  0.2  /  DBili  x   /  AST  16  /  ALT  23  /  AlkPhos  91  03-18          Iron - Total Binding Capacity.: 189 ug/dL (03-14 @ 07:08)  Ferritin: 569 ng/mL (03-14 @ 07:08)              BLOOD SMEAR INTERPRETATION:       RADIOLOGY & ADDITIONAL STUDIES:  < from: CT Abdomen and Pelvis w/ IV Cont (03.13.25 @ 17:26) >  Diffuse rectal and sigmoid wall likely due to a severe proctocolitis.    Mild bladder wall thickening with adjacent fat stranding, likely due to   cystitis.    Low-attenuation lesions within the spleen and central liver, decreased in   size from 12/9/2024 compatible positive response totreatment.    Unchanged retroperitoneal soft tissue encasing the aorta and IVC, which   could be due to post transplant lymphoproliferative disease given the   history of prior kidney transplant or retroperitoneal fibrosis.    Persistent lucency and heterogeneity within the sacrum, which could be   due to the patient's lymphoma, though ostium colitis could have a similar   appearance in the appropriate clinical setting.    < end of copied text >

## 2025-03-18 NOTE — PROGRESS NOTE ADULT - ATTENDING COMMENTS
I independently interviewed and examined JAN CALVIN. I reviewed and discussed the case with Dr Cheatham  and agree with his/her assessment and recommendations with the following additions:    MRI with non specific findings of the iliac bone and sacrum , bone marrow signal though appears unchanged since MRI of 12/2024. At the time had ESBL Ecoli from urinary infection and bacteremia, treated for 10 days only, NOT for osteomyelitis, and thus, the lack of progression speaks against infection in this case      continue initial plan for 7 days of vanco--> dapto--> vancomycin for Staph hominis bacteremia ??    off ertapenem     noted fungitell repeated and now at 393 from >500 back in january, Ct Chest with resolving infilgtrates no clear nodule but fungityell should have come down (if truly positive) with PCP treatemtn and resolution of infiltrates. So still unclear significance, but consisder posa ppx as we continue chemotherapy  Please proceed with next cycle of Chao-rituximab-revlimid    While on rituximab, check Weekly CMV PCR or consider valcyte ppx in this solid organ transplant patient - check CMV IGG as we do not know his serostatus from renal transplant in 2012          Thank you for involving us in the care of this patient  Transplant ID will continue to follow  Please call or page with additional questions  Pager; #[phone]  Teams: from 8 am to 5 pm  Suzie Argueta MD

## 2025-03-18 NOTE — CONSULT NOTE ADULT - ASSESSMENT
69M with hx of recently diagnosed DLBCL on polatuzumab-revlimid-rituximab, advanced PTLD, RP fibrosis, DM, HTN, HLD, ESRD on HD s/p renal transplant 2012 (from brother) on tacro, hypothyroidism, recurrent infections (sacral OM, ESBL UTI) now admitted for sepsis.     #DLBCL  -diagnosed on liver biopsy 12/16/24 Diffuse large B-cell lymphoma, germinal center B-cell subtype with high proliferation index, SWATHI-negative, Posttransplant (kidney). Given R-miniCHOP x1 with course c/b severe PJP infection so cycle 2 not given and switched to Rituximab/polatuzumab/Revlimid (10mg days 1-14) on a 21-day schedule.  -Started sunil-R2 Cycle 2 on 2/27. Today 3/18/25 is C2D20    #Sepsis 2/2 Staph bacteremia  -Bcx x2 on 3/13 with Staph hominis+staph haemolyticus, vanc and tetracycline sensitive  -repeat BCx on 3/16 NGTD    Recommend:  -transfuse for Hb>7 and Plt>10  -c/w ASA as pt is on revlimid  -c/w bactrim DS daily, valtrex 500mg BID  -on tacro for renal transplant, redosing pending levels  -daily CBC with diff, uric acid, LDH, fibrinogen  -empiric antibiotics per primary team and ID: on IV vanc  -7[facility]/leukemia service to follow    Note not finalized until signed by attending.     Kamille Peng MD PGY-4  Hematology/Oncology Fellow  Available on MS TEAMS  Pagers: CHERYL [phone]; [facility] [phone]    **For any questions please check LITYRONE on call or [facility] beau humphrey for heme fellow on call. 69M with hx of recently diagnosed DLBCL on polatuzumab-revlimid-rituximab, advanced PTLD, RP fibrosis, DM, HTN, HLD, ESRD on HD s/p renal transplant 2012 (from brother) on tacro, hypothyroidism, recurrent infections (sacral OM, ESBL UTI) now admitted for sepsis.     #DLBCL  -diagnosed on liver biopsy 12/16/24 Diffuse large B-cell lymphoma, germinal center B-cell subtype with high proliferation index, SWATHI-negative, Posttransplant (kidney).   Treatment hx:  	-12/29/2024: R-miniCHOP x1 with course c/b severe PJP infection so cycle 2 not given   	-2/13/2025: C1 Rituximab/polatuzumab/Revlimid (10mg days 1-14) on a 21-day schedule.  -Started sunil-R2 Cycle 2 on 2/27. Today 3/18/25 is C2D20    #Sepsis 2/2 Staph bacteremia  -Bcx x2 on 3/13 with Staph hominis+staph haemolyticus, vanc and tetracycline sensitive  -repeat BCx on 3/16 NGTD    Recommend:  -transfuse for Hb>7 and Plt>10  -c/w ASA as pt is on revlimid  -c/w bactrim DS daily, valtrex 500mg BID  -on tacro for renal transplant, redosing pending levels  -daily CBC with diff, uric acid, LDH, fibrinogen  -empiric antibiotics per primary team and ID: on IV vanc  -7[facility]/leukemia service to follow    Note not finalized until signed by attending.     Kamille Peng MD PGY-4  Hematology/Oncology Fellow  Available on MS TEAMS  Pagers: CHERYL [phone]; [facility] [phone]    **For any questions please check LITYRONE on call or [facility] beau schedule for heme fellow on call. 69M with hx of recently diagnosed DLBCL on polatuzumab-revlimid-rituximab, advanced PTLD, RP fibrosis, DM, HTN, HLD, ESRD on HD s/p renal transplant 2012 (from brother) on tacro, hypothyroidism, recurrent infections (sacral OM, ESBL UTI) now admitted for sepsis.     #DLBCL, Stage IV  - diagnosed on liver biopsy 12/16/24 Diffuse large B-cell lymphoma, germinal center B-cell subtype with high proliferation index, SWATHI-negative, Posttransplant (kidney).   - [place] stage IV as pt has extranodal involvement  Treatment hx:  	-12/29/2024: R-miniCHOP x1 with course c/b severe PJP infection so cycle 2 not given   	-2/13/2025: C1 Rituximab/polatuzumab/Revlimid (10mg days 1-14) on a 21-day schedule.  -Started sunil-R2 Cycle 2 on 2/27. Today 3/18/25 is C2D20    #Sepsis 2/2 Staph bacteremia  -Bcx x2 on 3/13 with Staph hominis+staph haemolyticus, vanc and tetracycline sensitive  -repeat BCx on 3/16 NGTD    Recommend:  -transfuse for Hb>7 and Plt>10  -c/w ASA as pt is on revlimid  -c/w bactrim DS daily, valtrex 500mg BID  -on tacro for renal transplant, redosing pending levels  -daily CBC with diff, uric acid, LDH, fibrinogen  -empiric antibiotics per primary team and ID: on IV vanc  -PET/CT after cycle 3  -7[facility]i/leukemia service to follow    Note not finalized until signed by attending.     Kamille Peng MD PGY-4  Hematology/Oncology Fellow  Available on MS TEAMS  Pagers: CHERYL [phone]; [facility] [phone]    **For any questions please check LITYRONE on call or [facility] beau schedule for heme fellow on call. 69M with hx of recently diagnosed DLBCL on polatuzumab-revlimid-rituximab, advanced PTLD, RP fibrosis, DM, HTN, HLD, ESRD on HD s/p renal transplant 2012 (from brother) on tacro, hypothyroidism, recurrent infections (sacral OM, ESBL UTI) now admitted for sepsis.     #DLBCL, Stage IV  - diagnosed on liver biopsy 12/16/24 Diffuse large B-cell lymphoma, germinal center B-cell subtype with high proliferation index, SWATHI-negative, Posttransplant (kidney).   - [place] stage IV as pt has extranodal involvement  Treatment hx:  	-12/29/2024: R-miniCHOP x1 with course c/b severe PJP infection so cycle 2 not given   	-2/13/2025: C1 Rituximab/polatuzumab/Revlimid (10mg days 1-14) on a 21-day schedule.  -Started sunil-R2 Cycle 2 on 2/27. Today 3/18/25 is C2D20    #Sepsis   -Bcx x2 on 3/13 with Staph hominis+staph haemolyticus, vanc and tetracycline sensitive  -repeat BCx on 3/16 NGTD  -GI PCR 3/14 Campylobacter  -MR pelvis pending given hx of sacral osteomyelitis    Recommend:  -transfuse for Hb>7 and Plt>10  -c/w ASA as pt is on revlimid  -c/w bactrim DS daily, valtrex 500mg BID  -on tacro for renal transplant, redosing pending levels  -daily CBC with diff, uric acid, LDH, fibrinogen  -empiric antibiotics per primary team and ID: on IV vanc  -PET/CT after cycle 3  -7[facility]i/leukemia service to follow    Note not finalized until signed by attending.     Kamille Peng MD PGY-4  Hematology/Oncology Fellow  Available on MS TEAMS  Pagers: CHERYL [phone]; [facility] [phone]    **For any questions please check LITYRONE on call or [facility] beau humphrey for heme fellow on call. 69M with hx of recently diagnosed DLBCL on polatuzumab-revlimid-rituximab, advanced PTLD, RP fibrosis, DM, HTN, HLD, ESRD on HD s/p renal transplant 2012 (from brother) on tacro, hypothyroidism, recurrent infections (sacral OM, ESBL UTI) now admitted for sepsis.     #DLBCL, Stage IV  - diagnosed on liver biopsy 12/16/24 Diffuse large B-cell lymphoma, germinal center B-cell subtype with high proliferation index, SWATHI-negative, Posttransplant (kidney).   - [place] stage IV as pt has extranodal involvement  Treatment hx:  	-12/29/2024: R-miniCHOP x1 with course c/b severe PJP infection so cycle 2 not given   	-2/13/2025: C1 Rituximab/polatuzumab/Revlimid (10mg days 1-14) on a 21-day schedule.  -Started chao-R2 Cycle 2 on 2/27. Today 3/18/25 is C2D20    #Sepsis   -Bcx x2 on 3/13 with Staph hominis+staph haemolyticus, vanc and tetracycline sensitive  -repeat BCx on 3/16 NGTD  -GI PCR 3/14 Campylobacter  -MR pelvis pending given hx of sacral osteomyelitis    Recommend:  -transfuse for Hb>7 and Plt>10  - Please send IgG level  -c/w ASA as pt is on revlimid  -c/w bactrim DS daily, valtrex 500mg BID  -on tacro for renal transplant, redosing pending levels  -daily CBC with diff, uric acid, LDH, fibrinogen  -empiric antibiotics per primary team and ID: on IV vanc  -PET/CT after cycle 3  -7[facility]i/leukemia service to follow. Pending bed availability, likely transfer to 7 or [facility] for C3 Chao-R2 due 3/19    Note not finalized until signed by attending.     Kamille Peng MD PGY-4  Hematology/Oncology Fellow  Available on MS TEAMS  Pagers: CHERYL [phone]; [facility] [phone]    **For any questions please check CHERYL on call or [facility] beau humphrey for heme fellow on call. 69M with hx of recently diagnosed DLBCL on polatuzumab-revlimid-rituximab, advanced PTLD, RP fibrosis, DM, HTN, HLD, ESRD on HD s/p renal transplant 2012 (from brother) on tacro, hypothyroidism, recurrent infections (sacral OM, ESBL UTI) now admitted for sepsis.     #DLBCL, Stage IV  - diagnosed on liver biopsy 12/16/24 Diffuse large B-cell lymphoma, germinal center B-cell subtype with high proliferation index, SWATHI-negative, Posttransplant (kidney).   - [place] stage IV as pt has extranodal involvement  -CT A/P on admission with some response in liver/spleen compared to 12/2024 but aortic/IVC mass about same size  Treatment hx:  	-12/29/2024: R-miniCHOP x1 with course c/b severe PJP infection so cycle 2 not given   	-2/13/2025: C1 Rituximab/polatuzumab/Revlimid (10mg days 1-14) on a 21-day schedule.  -Started chao-R2 Cycle 2 on 2/27. Today 3/18/25 is C2D20    #Sepsis   -Bcx x2 on 3/13 with Staph hominis+staph haemolyticus, vanc and tetracycline sensitive  -repeat BCx on 3/16 NGTD  -GI PCR 3/14 Campylobacter  -MR pelvis pending given hx of sacral osteomyelitis    Recommend:  -transfuse for Hb>7 and Plt>10  - Please send IgG level  - C3 chao-rituximab-revlimid due 3/19 technically, will likely give this admission  -c/w ASA as pt is on revlimid  -c/w bactrim DS daily, valtrex 500mg BID  -on tacro for renal transplant, redosing pending levels  -daily CBC with diff, uric acid, LDH, fibrinogen  -empiric antibiotics per primary team and ID: on IV vanc  -PET/CT after cycle 3  -7[facility]i/leukemia service to follow. Pending bed availability, likely transfer to 7 or [facility] for C3 Chao-R2 due 3/19    Note not finalized until signed by attending.     Kamille Peng MD PGY-4  Hematology/Oncology Fellow  Available on MS TEAMS  Pagers: CHERYL [phone]; [facility] [phone]    **For any questions please check LITYRONE on call or [facility] beau humphrey for heme fellow on call.

## 2025-03-18 NOTE — PROGRESS NOTE ADULT - PROBLEM SELECTOR PLAN 1
Staph hominis both bottles 3/13. Staph epi, staph hemolyticus  -f/u repeat blood cx (contamination? on first draw) 3/16 ngtd  Transplant ID eval appreciated --> swtiched from dapto to vancomycin 3/18  TTE --> was unable to r/o endocarditis   Plevic MRI ordered to assess for OM  -no clear source of infection, no wounds on sacrum.

## 2025-03-18 NOTE — PHARMACOTHERAPY INTERVENTION NOTE - COMMENTS
JAN CALVIN, 69y Male with CNS staph bacteremia (3/4 bottles with Staph hominis, 1/4 with Staph haemolyticus, and 1/4 with Staph epi) and GI PCR positive for campylobacter.    Recommendation(s):  - Discontinue azithromycin and ertapenem (completed course for campylobacter colitis)  - Discontinue daptomycin and start vancomycin 1g q12h    Jose Guadalupe Swain PharmD  PGY-2 ID Pharmacy Resident  Available on Microsoft Teams

## 2025-03-18 NOTE — PROGRESS NOTE ADULT - ASSESSMENT
69 year old M with a history of renal transplant (2012) on tacrolimus, s/p left nephrectomy, DLBCL s/p C1 R mini CHOP (chemo held 2/2 recent infections), peripheral neuropathy, hypothyroidism, retroperitoneal fibrosis encasing veins, HTN, HLD, GERD, anxiety/depression, ANGELIKA and recent admission at [facility] (1/17 - 2/6/25) for sepsis 2/2 Klebsiella pneumoniae ESBL UTI and AHRF c/f PJP pneumonia (s/p completion of atovaquone and pred course) pw unwitnessed fall, in setting of sepsis likely 2/2 UTI with staph bacteremia (rule out OM vs contaminant), Camplobacter s/p abx and toxic metabolic encephalopathy

## 2025-03-18 NOTE — PROGRESS NOTE ADULT - PROBLEM SELECTOR PLAN 8
Previous admission in Jan- Feb 2025 for R mini chop cycle 2, but chemo held at the time due to AHRF c/f PJP PNA, completed atovaquone/pred course.  polatuzumab-revlimid-rituximab    Plan:  -c/w bactrim DS daily for PJP ppx  -Heme consulted- to be transfer to [facility] for chemo  -d/w transplant ID- pending MRI pelvis to rule out OM

## 2025-03-18 NOTE — PROGRESS NOTE ADULT - SUBJECTIVE AND OBJECTIVE BOX
[facility] DIVISION OF KIDNEY DISEASES AND HYPERTENSION -- FOLLOW UP NOTE  --------------------------------------------------------------------------------  Chief Complaint:    24 hour events/subjective: Pt lying in bed comfortably, no complaints.     PAST HISTORY  --------------------------------------------------------------------------------  No significant changes to PMH, PSH, FHx, SHx, unless otherwise noted    ALLERGIES & MEDICATIONS  --------------------------------------------------------------------------------  Allergies    No Known Allergies    Intolerances      Standing Inpatient Medications  aspirin enteric coated 81 milliGRAM(s) Oral daily  buPROPion XL (24-Hour) . 300 milliGRAM(s) Oral daily  chlorhexidine 2% Cloths 1 Application(s) Topical <User Schedule>  cloNIDine 0.1 milliGRAM(s) Oral three times a day  dextrose 5%. 1000 milliLiter(s) IV Continuous <Continuous>  dextrose 5%. 1000 milliLiter(s) IV Continuous <Continuous>  dextrose 50% Injectable 25 Gram(s) IV Push once  dextrose 50% Injectable 12.5 Gram(s) IV Push once  dextrose 50% Injectable 25 Gram(s) IV Push once  escitalopram 10 milliGRAM(s) Oral daily  glucagon  Injectable 1 milliGRAM(s) IntraMuscular once  heparin   Injectable 5000 Unit(s) SubCutaneous every 8 hours  hydrALAZINE 100 milliGRAM(s) Oral every 8 hours  insulin glargine Injectable (LANTUS) 18 Unit(s) SubCutaneous at bedtime  insulin lispro (ADMELOG) corrective regimen sliding scale   SubCutaneous three times a day before meals  insulin lispro (ADMELOG) corrective regimen sliding scale   SubCutaneous at bedtime  insulin lispro Injectable (ADMELOG) 10 Unit(s) SubCutaneous three times a day before meals  iron sucrose IVPB 200 milliGRAM(s) IV Intermittent every 24 hours  lactated ringers. 1000 milliLiter(s) IV Continuous <Continuous>  levothyroxine 88 MICROGram(s) Oral daily  melatonin 3 milliGRAM(s) Oral at bedtime  pantoprazole    Tablet 40 milliGRAM(s) Oral before breakfast  polyethylene glycol 3350 17 Gram(s) Oral two times a day  predniSONE   Tablet 5 milliGRAM(s) Oral daily  QUEtiapine 25 milliGRAM(s) Oral at bedtime  rosuvastatin 10 milliGRAM(s) Oral at bedtime  senna 2 Tablet(s) Oral at bedtime  tacrolimus 1 milliGRAM(s) Oral two times a day  trimethoprim  160 mG/sulfamethoxazole 800 mG 1 Tablet(s) Oral daily  valACYclovir 500 milliGRAM(s) Oral every 12 hours  vancomycin  IVPB 1000 milliGRAM(s) IV Intermittent every 12 hours    PRN Inpatient Medications  acetaminophen     Tablet .. 1000 milliGRAM(s) Oral every 6 hours PRN  aluminum hydroxide/magnesium hydroxide/simethicone Suspension 30 milliLiter(s) Oral every 4 hours PRN  dextrose Oral Gel 15 Gram(s) Oral once PRN  OLANZapine Injectable 2.5 milliGRAM(s) IntraMuscular every 6 hours PRN  ondansetron Injectable 4 milliGRAM(s) IV Push every 8 hours PRN      REVIEW OF SYSTEMS  --------------------------------------------------------------------------------  Gen: No fevers/chills  Respiratory: No dyspnea, cough,   CV: No chest pain, PND, orthopnea  GI: No abdominal pain, diarrhea, constipation, nausea, vomiting  Transplant: No pain  : No increased frequency, dysuria, hematuria   MSK: No edema  Neuro: No dizziness/lightheadedness    All other systems were reviewed and are negative, except as noted.    VITALS/PHYSICAL EXAM  --------------------------------------------------------------------------------  T(C): 36.8 (03-18-25 @ 12:46), Max: 37 (03-17-25 @ 21:06)  HR: 78 (03-18-25 @ 12:46) (73 - 78)  BP: 162/78 (03-18-25 @ 12:46) (162/78 - 183/72)  RR: 18 (03-18-25 @ 12:46) (18 - 19)  SpO2: 99% (03-18-25 @ 12:46) (98% - 99%)  Wt(kg): --        03-17-25 @ 07:01  -  03-18-25 @ 07:00  --------------------------------------------------------  IN: 0 mL / OUT: 910 mL / NET: -910 mL      Physical Exam:  	Gen: NAD, able to speak in full sentences   	HEENT: PERRL, MMM   	Pulm: CTA B/L, no crackles   	CV: RRR, S1S2+  	Abd: +BS, soft          Transplant: No tenderness, swelling  	: No suprapubic tenderness  	MSK: no edema   	Psych: Normal affect and mood  	Skin: Warm          Access:    LABS/STUDIES  --------------------------------------------------------------------------------              9.3    5.74  >-----------<  267      [03-18-25 @ 07:07]              29.5     137  |  103  |  13  ----------------------------<  342      [03-18-25 @ 07:08]  4.3   |  24  |  0.75        Ca     9.5     [03-18-25 @ 07:08]      Mg     1.4     [03-18-25 @ 07:08]    TPro  5.9  /  Alb  2.9  /  TBili  0.2  /  DBili  x   /  AST  16  /  ALT  23  /  AlkPhos  91  [03-18-25 @ 07:08]              [03-18-25 @ 07:08]    Creatinine Trend:  SCr 0.75 [03-18 @ 07:08]  SCr 0.86 [03-17 @ 06:31]  SCr 0.96 [03-14 @ 07:08]  SCr 0.85 [03-13 @ 15:26]  SCr 0.68 [02-27 @ 11:49]    Tacrolimus (), Serum: 2.7 ng/mL (03-18 @ 07:07)  Tacrolimus (), Serum: 3.4 ng/mL (03-17 @ 06:31)  Tacrolimus (), Serum: 4.0 ng/mL (03-16 @ 06:20)      Urinalysis - [03-18-25 @ 07:08]      Color  / Appearance  / SG  / pH       Gluc 342 / Ketone   / Bili  / Urobili        Blood  / Protein  / Leuk Est  / Nitrite       RBC  / WBC  / Hyaline  / Gran  / Sq Epi  / Non Sq Epi  / Bacteria       Iron 12, TIBC 189, %sat 6      [03-14-25 @ 07:08]  Ferritin 569      [03-14-25 @ 07:08]  PTH -- (Ca 11.2)      [12-28-24 @ 10:06]   14  PTH -- (Ca --)      [12-02-24 @ 11:50]   11  Vitamin D (25OH) 39.8      [12-28-24 @ 10:06]  TSH 4.47      [03-13-25 @ 15:26]

## 2025-03-18 NOTE — CONSULT NOTE ADULT - ATTENDING COMMENTS
.    Primary: [place]    Vital Signs Last 24 Hrs  T(C): 36.8 (18 Mar 2025 12:46), Max: 37 (17 Mar 2025 21:06)  T(F): 98.2 (18 Mar 2025 12:46), Max: 98.6 (17 Mar 2025 21:06)  HR: 78 (18 Mar 2025 12:46) (73 - 78)  BP: 162/78 (18 Mar 2025 12:46) (162/78 - 183/72)  BP(mean): --  RR: 18 (18 Mar 2025 12:46) (18 - 19)  SpO2: 99% (18 Mar 2025 12:46) (98% - 99%)    Parameters below as of 18 Mar 2025 12:46  Patient On (Oxygen Delivery Method): room air    MEDICATIONS  (STANDING):  aspirin enteric coated 81 milliGRAM(s) Oral daily  buPROPion XL (24-Hour) . 300 milliGRAM(s) Oral daily  chlorhexidine 2% Cloths 1 Application(s) Topical <User Schedule>  cloNIDine 0.1 milliGRAM(s) Oral three times a day  dextrose 5%. 1000 milliLiter(s) (100 mL/Hr) IV Continuous <Continuous>  dextrose 5%. 1000 milliLiter(s) (50 mL/Hr) IV Continuous <Continuous>  dextrose 50% Injectable 25 Gram(s) IV Push once  dextrose 50% Injectable 12.5 Gram(s) IV Push once  dextrose 50% Injectable 25 Gram(s) IV Push once  escitalopram 10 milliGRAM(s) Oral daily  glucagon  Injectable 1 milliGRAM(s) IntraMuscular once  heparin   Injectable 5000 Unit(s) SubCutaneous every 8 hours  hydrALAZINE 100 milliGRAM(s) Oral every 8 hours  insulin glargine Injectable (LANTUS) 18 Unit(s) SubCutaneous at bedtime  insulin lispro (ADMELOG) corrective regimen sliding scale   SubCutaneous three times a day before meals  insulin lispro (ADMELOG) corrective regimen sliding scale   SubCutaneous at bedtime  insulin lispro Injectable (ADMELOG) 10 Unit(s) SubCutaneous three times a day before meals  iron sucrose IVPB 200 milliGRAM(s) IV Intermittent every 24 hours  lactated ringers. 1000 milliLiter(s) (75 mL/Hr) IV Continuous <Continuous>  levothyroxine 88 MICROGram(s) Oral daily  melatonin 3 milliGRAM(s) Oral at bedtime  pantoprazole    Tablet 40 milliGRAM(s) Oral before breakfast  polyethylene glycol 3350 17 Gram(s) Oral two times a day  predniSONE   Tablet 5 milliGRAM(s) Oral daily  QUEtiapine 25 milliGRAM(s) Oral at bedtime  rosuvastatin 10 milliGRAM(s) Oral at bedtime  senna 2 Tablet(s) Oral at bedtime  tacrolimus 1 milliGRAM(s) Oral two times a day  trimethoprim  160 mG/sulfamethoxazole 800 mG 1 Tablet(s) Oral daily  valACYclovir 500 milliGRAM(s) Oral every 12 hours  vancomycin  IVPB 1000 milliGRAM(s) IV Intermittent every 12 hours      Assessment: 69-year-old day 20 cycle 2 Rituximab/polatuzumab for post renal transplant, advanced stage PTLD.   Course complicated by severe PCP infection on day 1 of cycle 2 miniRCHOP (and cycle 2 was not given); currently complicated by bacteremia (3/13/25) Staph hominis+staph haemolyticus,  ?  Onc History: [facility] [place] x1 (12/17/24)  ?  PMHx:  1) renal transplant 2012: H/O left nephrectomy; renal transplant was secondary to DM  2) AODM  3) HLD  4) hypothyroidism  5) peripheral neuropathy  6) retroperitoneal fibrosis  7) GERD  8) HTN  9) anxiety/depression  10: obstructive sleep apnea  11) osteomyelitis and E coli urosepsis (12/1/24 - 12/18/24)  12) PCP (active)  ?  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  ? .  Primary: [place]    Vital Signs Last 24 Hrs  T(C): 36.8 (18 Mar 2025 12:46), Max: 37 (17 Mar 2025 21:06)  T(F): 98.2 (18 Mar 2025 12:46), Max: 98.6 (17 Mar 2025 21:06)  HR: 78 (18 Mar 2025 12:46) (73 - 78)  BP: 162/78 (18 Mar 2025 12:46) (162/78 - 183/72)  BP(mean): --  RR: 18 (18 Mar 2025 12:46) (18 - 19)  SpO2: 99% (18 Mar 2025 12:46) (98% - 99%)    MEDICATIONS  (STANDING):  aspirin enteric coated 81 milliGRAM(s) Oral daily  buPROPion XL (24-Hour) . 300 milliGRAM(s) Oral daily  chlorhexidine 2% Cloths 1 Application(s) Topical <User Schedule>  cloNIDine 0.1 milliGRAM(s) Oral three times a day  dextrose 5%. 1000 milliLiter(s) (100 mL/Hr) IV Continuous <Continuous>  dextrose 5%. 1000 milliLiter(s) (50 mL/Hr) IV Continuous <Continuous>  dextrose 50% Injectable 25 Gram(s) IV Push once  dextrose 50% Injectable 12.5 Gram(s) IV Push once  dextrose 50% Injectable 25 Gram(s) IV Push once  escitalopram 10 milliGRAM(s) Oral daily  glucagon  Injectable 1 milliGRAM(s) IntraMuscular once  heparin   Injectable 5000 Unit(s) SubCutaneous every 8 hours  hydrALAZINE 100 milliGRAM(s) Oral every 8 hours  insulin glargine Injectable (LANTUS) 18 Unit(s) SubCutaneous at bedtime  insulin lispro (ADMELOG) corrective regimen sliding scale   SubCutaneous three times a day before meals  insulin lispro (ADMELOG) corrective regimen sliding scale   SubCutaneous at bedtime  insulin lispro Injectable (ADMELOG) 10 Unit(s) SubCutaneous three times a day before meals  iron sucrose IVPB 200 milliGRAM(s) IV Intermittent every 24 hours  lactated ringers. 1000 milliLiter(s) (75 mL/Hr) IV Continuous <Continuous>  levothyroxine 88 MICROGram(s) Oral daily  melatonin 3 milliGRAM(s) Oral at bedtime  pantoprazole    Tablet 40 milliGRAM(s) Oral before breakfast  polyethylene glycol 3350 17 Gram(s) Oral two times a day  predniSONE   Tablet 5 milliGRAM(s) Oral daily  QUEtiapine 25 milliGRAM(s) Oral at bedtime  rosuvastatin 10 milliGRAM(s) Oral at bedtime  senna 2 Tablet(s) Oral at bedtime  tacrolimus 1 milliGRAM(s) Oral two times a day  trimethoprim  160 mG/sulfamethoxazole 800 mG 1 Tablet(s) Oral daily  valACYclovir 500 milliGRAM(s) Oral every 12 hours  vancomycin  IVPB 1000 milliGRAM(s) IV Intermittent every 12 hours    Assessment: 69-year-old day 20 cycle 2 Rituximab/polatuzumab for post renal transplant, advanced stage PTLD.   Course complicated by severe PCP infection on day 1 of cycle 2 miniRCHOP (and cycle 2 was not given); currently complicated by bacteremia (3/13/25) Staph hominis+staph haemolyticus and Gi PCr + for Campylobacter.     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      Discussion: While Piero's recurrent infections are life-threatening, the goal of his current care is that of his PTLD - also a fatal disease untreated.  We have adopted for Piero a novel, chemotherapy free approach (started on 2/13/25) and compared to previous CT scans (Jan 23, 2025) he has had a remarkable response.     Please transfer to 8 [facility]: Malignant Hematology is willing to attend.     Plan:  Heme:   Continue sunil/rev/rituxmab was to be infused tomorrow, will delay for transfer to oncology unit and for Revlimid   ASA secondary to Revlimid   Scot has an elevated ferritin: please D/C iron sucrose IVPB 200 milliGRAM(s) IV Intermittent every 24 hours; the low iron saturation is from Hepcidin    Radiology:   MRI [place] (3/18/25): pending, CT chest (3/17/25): no adenopathy, CT Abd (3/13/25): near resolution of hepatic (lymphoma) lesion    ID: valtrex/vanco/bactrim, ID has requested serum markers: fungitell and LDH (please recall LDH is also elevated in DLBCL)    Renal transplant: pred and tacro    DVT prophylaxis: please change to LWHx.    Over 60 minutes were spent in direct patient, non-resident teaching, care and care coordination today. .  Primary: [place]    Vital Signs Last 24 Hrs  T(C): 36.8 (18 Mar 2025 12:46), Max: 37 (17 Mar 2025 21:06)  T(F): 98.2 (18 Mar 2025 12:46), Max: 98.6 (17 Mar 2025 21:06)  HR: 78 (18 Mar 2025 12:46) (73 - 78)  BP: 162/78 (18 Mar 2025 12:46) (162/78 - 183/72)  BP(mean): --  RR: 18 (18 Mar 2025 12:46) (18 - 19)  SpO2: 99% (18 Mar 2025 12:46) (98% - 99%)    MEDICATIONS  (STANDING):  aspirin enteric coated 81 milliGRAM(s) Oral daily  buPROPion XL (24-Hour) . 300 milliGRAM(s) Oral daily  chlorhexidine 2% Cloths 1 Application(s) Topical <User Schedule>  cloNIDine 0.1 milliGRAM(s) Oral three times a day  dextrose 5%. 1000 milliLiter(s) (100 mL/Hr) IV Continuous <Continuous>  dextrose 5%. 1000 milliLiter(s) (50 mL/Hr) IV Continuous <Continuous>  dextrose 50% Injectable 25 Gram(s) IV Push once  dextrose 50% Injectable 12.5 Gram(s) IV Push once  dextrose 50% Injectable 25 Gram(s) IV Push once  escitalopram 10 milliGRAM(s) Oral daily  glucagon  Injectable 1 milliGRAM(s) IntraMuscular once  heparin   Injectable 5000 Unit(s) SubCutaneous every 8 hours  hydrALAZINE 100 milliGRAM(s) Oral every 8 hours  insulin glargine Injectable (LANTUS) 18 Unit(s) SubCutaneous at bedtime  insulin lispro (ADMELOG) corrective regimen sliding scale   SubCutaneous three times a day before meals  insulin lispro (ADMELOG) corrective regimen sliding scale   SubCutaneous at bedtime  insulin lispro Injectable (ADMELOG) 10 Unit(s) SubCutaneous three times a day before meals  iron sucrose IVPB 200 milliGRAM(s) IV Intermittent every 24 hours  lactated ringers. 1000 milliLiter(s) (75 mL/Hr) IV Continuous <Continuous>  levothyroxine 88 MICROGram(s) Oral daily  melatonin 3 milliGRAM(s) Oral at bedtime  pantoprazole    Tablet 40 milliGRAM(s) Oral before breakfast  polyethylene glycol 3350 17 Gram(s) Oral two times a day  predniSONE   Tablet 5 milliGRAM(s) Oral daily  QUEtiapine 25 milliGRAM(s) Oral at bedtime  rosuvastatin 10 milliGRAM(s) Oral at bedtime  senna 2 Tablet(s) Oral at bedtime  tacrolimus 1 milliGRAM(s) Oral two times a day  trimethoprim  160 mG/sulfamethoxazole 800 mG 1 Tablet(s) Oral daily  valACYclovir 500 milliGRAM(s) Oral every 12 hours  vancomycin  IVPB 1000 milliGRAM(s) IV Intermittent every 12 hours    Assessment: 69-year-old day 20 cycle 2 Rituximab/polatuzumab for post renal transplant, advanced stage PTLD.   Course complicated by severe PCP infection on day 1 of cycle 2 miniRCHOP (and cycle 2 was not given); currently complicated by bacteremia (3/13/25) Staph hominis+staph haemolyticus and Gi PCr + for Campylobacter.     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      Discussion: While Piero's recurrent infections are life-threatening, the goal of his current care is that of his PTLD - also a fatal disease untreated.  We have adopted for Piero a novel, chemotherapy free approach (started on 2/13/25) and compared to previous CT scans (Jan 23, 2025) he has had a remarkable response.     Please transfer to 8 K[facility]: Malignant Hematology is willing to attend.     Plan:  Heme:   Continue sunil/rev/rituxmab was to be infused tomorrow, will delay for transfer to oncology unit and for Revlimid   ASA secondary to Revlimid   Scot has an elevated ferritin: continue iron sucrose IVPB 200 milliGRAM(s) IV Intermittent every 24 hours; the low iron saturation is from Hepcidin    Radiology:   MRI [place] (3/18/25): pending, CT chest (3/17/25): no adenopathy, CT Abd (3/13/25): near resolution of hepatic (lymphoma) lesion    ID: valtrex/vanco/bactrim, ID has requested serum markers: fungitell and LDH (please recall LDH is also elevated in DLBCL)    Renal transplant: pred and tacro    DVT prophylaxis: please change to LWHx.    Over 60 minutes were spent in direct patient, non-resident teaching, care and care coordination today.

## 2025-03-18 NOTE — PROGRESS NOTE ADULT - ASSESSMENT
69M with a PMH of DM, HTN, HLD, ESRD on HD s/p LKRT 2012 (from brother), hypothyroidism, recently diagnosed DLBCL on RCHOP who presented from rehab s/p fall and found to be bacteremic. Transplant nephrology consulted for immunosuppression management.     1. Renal Transplant Recipient  - S/p LRRT 2012 from brother  - Baseline Scr ~0.8, Scr stable at 0.7 today  - Pt follows with [facility] (Dr. King)  - Monitor labs, urine output. Avoid nephrotoxins. Dose meds per eGFR    2. Immunosuppression  - Regimen recently changed due to RCHOP therapy  - Pt and spouse unaware of meds. Dr. King's office ([phone]).   - Per last inpatient notes, pt to be on Tacrolimus 1mg BID and Pred 5mg daily  - Tac level low at 2.7, rec to increase tacrolmus to 1.5 bid  - Check Tacrolimus level qam (~30min prior to dose)    3. Bacteremia  - Transplant ID consult    Please call if you have any questions  Lalo Bruner, PGY4  Nephrology Fellow  [phone]/Teams preferred  (After 5PM or weekends, reach out to fellow on call)

## 2025-03-18 NOTE — PROGRESS NOTE ADULT - SUBJECTIVE AND OBJECTIVE BOX
Interval History/ROS:   Patient had no events overnight.  Currently denies any acute complaints at this time  Patient is afebrile and hemodynamically stable  No leukocytosis seen on labs    REVIEW OF SYSTEMS  Constitutional: No fevers   Skin: No rash  Eyes: No discharge, No change in vision	  ENMT: No sore throat, No ulcers  Respiratory: No cough, no SOB  Cardiovascular:  No chest pain, No palpitations   Gastrointestinal: No pain, No nausea, No vomiting  Genitourinary: No dysuria, No frequency  MSK: No Joint pain  Neurological: No HA        Allergies  No Known Allergies        ANTIMICROBIALS:    trimethoprim  160 mG/sulfamethoxazole 800 mG 1 daily  valACYclovir 500 every 12 hours  vancomycin  IVPB 1000 every 12 hours      OTHER MEDS: MEDICATIONS  (STANDING):  acetaminophen     Tablet .. 1000 every 6 hours PRN  aluminum hydroxide/magnesium hydroxide/simethicone Suspension 30 every 4 hours PRN  aspirin enteric coated 81 daily  buPROPion XL (24-Hour) . 300 daily  cloNIDine 0.1 three times a day  dextrose 50% Injectable 25 once  dextrose 50% Injectable 12.5 once  dextrose 50% Injectable 25 once  dextrose Oral Gel 15 once PRN  escitalopram 10 daily  glucagon  Injectable 1 once  heparin   Injectable 5000 every 8 hours  hydrALAZINE 100 every 8 hours  insulin glargine Injectable (LANTUS) 18 at bedtime  insulin lispro (ADMELOG) corrective regimen sliding scale  three times a day before meals  insulin lispro (ADMELOG) corrective regimen sliding scale  at bedtime  insulin lispro Injectable (ADMELOG) 10 three times a day before meals  levothyroxine 88 daily  melatonin 3 at bedtime  OLANZapine Injectable 2.5 every 6 hours PRN  ondansetron Injectable 4 every 8 hours PRN  pantoprazole    Tablet 40 before breakfast  polyethylene glycol 3350 17 two times a day  predniSONE   Tablet 5 daily  QUEtiapine 25 at bedtime  rosuvastatin 10 at bedtime  senna 2 at bedtime  tacrolimus 1 two times a day      Vital Signs Last 24 Hrs  T(F): 98.2 (03-18-25 @ 12:46), Max: 100.7 (03-13-25 @ 15:25)    Vital Signs Last 24 Hrs  HR: 78 (03-18-25 @ 12:46) (68 - 78)  BP: 162/78 (03-18-25 @ 12:46) (159/80 - 183/72)  RR: 18 (03-18-25 @ 12:46)  SpO2: 99% (03-18-25 @ 12:46) (98% - 99%)  Wt(kg): --    EXAM:  Constitutional:no acute distress  Eyes:TRACI	  Respiratory: clear to auscultation bilaterally   Cardiovascular: S1S2  Gastrointestinal:soft, (+) BS, no tenderness  Extremities:no edema  No Lymphadenopathy  IV sites not inflammed.      Labs:                        9.3    5.74  )-----------( 267      ( 18 Mar 2025 07:07 )             29.5     03-18    137  |  103  |  13  ----------------------------<  342[H]  4.3   |  24  |  0.75    Ca    9.5      18 Mar 2025 07:08  Mg     1.4     03-18    TPro  5.9[L]  /  Alb  2.9[L]  /  TBili  0.2  /  DBili  x   /  AST  16  /  ALT  23  /  AlkPhos  91  03-18      WBC Trend:  WBC Count: 5.74 (03-18-25 @ 07:07)  WBC Count: 10.20 (03-14-25 @ 07:08)  WBC Count: 10.55 (03-13-25 @ 15:26)      Creatine Trend:  Creatinine: 0.75 (03-18)  Creatinine: 0.86 (03-17)  Creatinine: 0.96 (03-14)  Creatinine: 0.85 (03-13)      Liver Biochemical Testing Trend:  Alanine Aminotransferase (ALT/SGPT): 23 (03-18)  Alanine Aminotransferase (ALT/SGPT): 22 (03-13)  Alanine Aminotransferase (ALT/SGPT): 34 (02-27)  Alanine Aminotransferase (ALT/SGPT): 25 (02-04)  Alanine Aminotransferase (ALT/SGPT): 24 (02-03)  Aspartate Aminotransferase (AST/SGOT): 16 (03-18-25 @ 07:08)  Aspartate Aminotransferase (AST/SGOT): 25 (03-13-25 @ 15:26)  Aspartate Aminotransferase (AST/SGOT): 29 (02-27-25 @ 11:49)  Aspartate Aminotransferase (AST/SGOT): 23 (02-04-25 @ 07:41)  Aspartate Aminotransferase (AST/SGOT): 25 (02-03-25 @ 07:15)  Bilirubin Total: 0.2 (03-18)  Bilirubin Total: 0.6 (03-13)  Bilirubin Total: 0.3 (02-27)  Bilirubin Total: 0.4 (02-04)  Bilirubin Total: 0.3 (02-03)      Trend LDH  03-18-25 @ 07:08  153  02-27-25 @ 11:49  201  01-28-25 @ 07:34  517[H]  01-27-25 @ 07:00  519[H]  01-26-25 @ 17:42  505[H]      Urinalysis Basic - ( 18 Mar 2025 07:08 )    Urinalysis + Microscopic Examination (03.13.25 @ 16:08)   pH Urine: 6.0  Urine Appearance: Cloudy  Color: Dark Yellow  Specific Gravity: 1.025  Protein, Urine: 30 mg/dL  Glucose Qualitative, Urine: 500 mg/dL  Ketone - Urine: Negative mg/dL  Blood, Urine: Negative  Bilirubin: Small  Urobilinogen: 1.0 mg/dL  Leukocyte Esterase Concentration: Moderate  Nitrite: Negative  White Blood Cell - Urine: 0-2 /HPF  Red Blood Cell - Urine: None Seen /HPFColor: x / Appearance: x / SG: x / pH: x  Gluc: 342 mg/dL / Ketone: x  / Bili: x / Urobili: x   Blood: x / Protein: x / Nitrite: x   Leuk Esterase: x / RBC: x / WBC x   Sq Epi: x / Non Sq Epi: x / Bacteria: x        MICROBIOLOGY:  Vancomycin Level, Random: 4.7 (03-14 @ 07:08)    MRSA PCR Result.: NotDetec (03-14-25 @ 16:11)  MRSA PCR Result.: NotDetec (03-13-25 @ 16:55)  MRSA PCR Result.: NotDetec (01-21-25 @ 17:23)      Culture - Blood (collected 16 Mar 2025 13:35)  Source: Blood Blood  Preliminary Report:    No growth at 24 hours    Culture - Urine (collected 13 Mar 2025 16:08)  Source: Catheterized Catheterized  Final Report:    10,000 - 49,000 CFU/mL Candida albicans    "Susceptibilities not performed"    Culture - Blood (collected 13 Mar 2025 15:06)  Source: Blood Blood-Peripheral  Final Report:    Growth in aerobic and anaerobic bottles: Staphylococcus hominis    Growth in anaerobic bottle: Staphylococcus epidermidis    "Susceptibilities not performed"    Culture - Blood (collected 13 Mar 2025 15:01)  Source: Blood Blood-Peripheral  Final Report:    Growth in aerobic bottle: Staphylococcus haemolyticus    Growth in aerobic bottle: Staphylococcus hominis    Direct identification is available within approximately 3-5    hours either by Blood Panel Multiplexed PCR or Direct    MALDI-TOF. Details: [email or web address]  Organism: Blood Culture PCR  Staphylococcus haemolyticus  Staphylococcus hominis  Organism: Staphylococcus hominis    Sensitivities:      Method Type: CHRIST      -  Clindamycin: S <=0.25      -  Erythromycin: R >4      -  Gentamicin: S <=4 Should not be used as monotherapy      -  Oxacillin: R >2      -  Penicillin: R >2      -  Rifampin: S <=1 Should not be used as monotherapy      -  Tetracycline: S <=4      -  Trimethoprim/Sulfamethoxazole: R >2/38      -  Vancomycin: S 0.5  Organism: Staphylococcus haemolyticus    Sensitivities:      Method Type: CHRIST      -  Clindamycin: R >4      -  Erythromycin: R >4      -  Gentamicin: R >8 Should not be used as monotherapy      -  Oxacillin: R >2      -  Penicillin: R >2      -  Rifampin: R >2 Should not be used as monotherapy      -  Tetracycline: S <=4      -  Trimethoprim/Sulfamethoxazole: R >2/38      -  Vancomycin: S 2  Organism: Blood Culture PCR    Sensitivities:      Method Type: PCR      -  Coagulase negative Staphylococcus: Detec    Culture - Blood (collected 24 Jan 2025 00:46)  Source: .Blood BLOOD  Final Report:    No growth at 5 days    Culture - Blood (collected 24 Jan 2025 00:18)  Source: .Blood BLOOD  Final Report:    No growth at 5 days    Culture - Urine (collected 22 Jan 2025 05:05)  Source: Clean Catch Clean Catch (Midstream)  Final Report:    10,000 - 49,000 CFU/mL Candida albicans    "Susceptibilities not performed"    Culture - Blood (collected 21 Jan 2025 22:25)  Source: .Blood BLOOD  Final Report:    No growth at 5 days    Culture - Blood (collected 21 Jan 2025 22:07)  Source: .Blood BLOOD  Final Report:    No growth at 5 days    Culture - Blood (collected 19 Jan 2025 18:36)  Source: .Blood BLOOD  Final Report:    No growth at 5 days      HIV-1/2 Combo Result: Nonreact (12-27-24 @ 13:43)        Cytomegalovirus By PCR: NotDetec IU/mL (03-16-25 @ 13:43)      Ferritin: 569 (03-14)      Lactate Dehydrogenase, Serum: 153 (03-18)            RADIOLOGY:  < from: CT Chest No Cont (03.17.25 @ 20:47) >    IMPRESSION:  Near complete resolution prior bilateral lung opacities. Small left   pleural effusion associated left pleural thickening with left lower lobe   atelectasis/round atelectasis.    < end of copied text >  < from: CT Abdomen and Pelvis w/ IV Cont (03.13.25 @ 17:26) >  IMPRESSION:  Diffuse rectal and sigmoid wall likely due to a severe proctocolitis.    Mild bladder wall thickening with adjacent fat stranding, likely due to   cystitis.    Low-attenuation lesions within the spleen and central liver, decreased in   size from 12/9/2024 compatible positive response totreatment.    Unchanged retroperitoneal soft tissue encasing the aorta and IVC, which   could be due to post transplant lymphoproliferative disease given the   history of prior kidney transplant or retroperitoneal fibrosis.    Persistent lucency and heterogeneity within the sacrum, which could be   due to the patient's lymphoma, though ostium colitis could have a similar   appearance in the appropriate clinical setting.    < end of copied text >  < from: CT Head No Cont (03.13.25 @ 17:26) >    IMPRESSION:    CT BRAIN:    No CT evidence for acute territorial infarct, hematoma or mass effect.      CT CERVICAL SPINE:    No acute fracture or subluxation of the cervical spine.    < end of copied text >  imaging below personally reviewed

## 2025-03-18 NOTE — PROGRESS NOTE ADULT - PROBLEM SELECTOR PLAN 7
BP in 140s systolic in ED. Per rehab med list pt is on Carvedilol 12.5 mg BID, clonidine 0.1 mg TID, hydralazine 100 mg q8    Plan:  -c/w clonidine 0.1 mg TID w hold parameters to prevent rebound HTN  -Coreg 12.5 mg BID on hold  -resume home hydralazine

## 2025-03-18 NOTE — CONSULT NOTE ADULT - SUBJECTIVE AND OBJECTIVE BOX
Reason For Consult: DM    HPI:  69 year old M with a history of renal transplant (2012) on tacrolimus, s/p left nephrectomy, DLBCL s/p C1 R mini CHOP (chemo held 2/2 recent infections), peripheral neuropathy, hypothyroidism, retroperitoneal fibrosis encasing veins, HTN, HLD, GERD, anxiety/depression, ANGELIKA and recent admission at [facility] (1/17 - 2/6/25) for sepsis 2/2 Klebsiella pneumoniae ESBL UTI and AHRF c/f PJP pneumonia (s/p completion of atovaquone and pred course) presenting after unwitnessed fall at his rehab facility.     On assessment patient oriented to self and place but incoherent and verbally aggressive w tangential thoughts, refusing exam and assessment and unable to answer history questions. Details regarding the fall are limited as collateral information limited. Attempted to call patient's wife who is the emergency contact but was not able to reach. Patient's son was contacted who reports his father "acts this way" when he has infections but was unsure about preceding events.     On arrival febrile to 100.7 rectally, /65, HR 88, RR 18, Sat 100% on RA. RVP neg. UA w mod leuk esterase, neg nitrite, 0-2 wbc. CT head/ c spine neg for acute territorial infarct, hematoma or mass effect. No acute fracture or subluxation of the cervical spine. CT A/P with w severe proctocolitis and cystitis. Received Vanc and zosyn x1, 1 L LR bolus,       endocrine called for DM   pt is a limited informant   he states prior to rehab was taking long acting and short acting insulin   Hx of Type 2 DM (A1c 8.4% in Jan 2025) and steroid induced hyperglycemia during PJP tx course.   during last admission in feb 2025  - While on prednisone 20mg daily pt was on Lantus 11 units QHS and Admelog 24 units with breakfast, 18units with lunch and 14 units with dinner,  Per rehab records, pt is on 20 units glargine in PM and 10 units glargine in AM and Admelog 24/18/14 premeals.  it appears that even with less steroids on board at rehab pt has remained on high dose ademelog     prior to feb 2025 admission rehab doses  - Home regimen: Came from rehab, there he was on Lantus 38 units, Admelog 10 units with correction scale    glucose 200s -300s while inpt   while inpt pt reports good appetite and finishing meals   pt also with hx of hypothyroidism on levothyroxine 88mcg                PAST MEDICAL & SURGICAL HISTORY:  Diabetes Mellitus Type II  Insulin pump (2010)      GERD (gastroesophageal reflux disease)      Depression      Hypothyroidism      Hyperlipidemia      Kidney transplanted  2012      Hypertension      ANGELIKA (obstructive sleep apnea)      Peripheral neuropathy      Shoulder fracture  Left.      Hypothyroidism      History of renal transplant      DLBCL (diffuse large B cell lymphoma)      Infectious disease      Type 2 diabetes mellitus      Hypothyroidism      Transplanted kidney      History of colonoscopy      S/P kidney transplant  2012      S/P cholecystectomy      Retroperitoneal fibrosis  s/p surgery      AV fistula  Right arm      S/P right cataract extraction      S/P left cataract extraction      H/O unilateral nephrectomy  Left          FAMILY HISTORY:  MI (myocardial infarction)    Family history of obesity (Sibling)        Social History:  does not report illcitis     Outpatient Medications:        Home Medications:   * Patient Currently Takes Medications as of 13-Mar-2025 21:36 documented in Structured Notes  · 	Bactrim  mg-160 mg oral tablet: Last Dose Taken:  , 1 tab(s) orally once a day starting on 2/14/25  · 	Admelog SoloStar 100 units/mL injectable solution: Last Dose Taken:  , 14 unit(s) injectable once a day before dinner  · 	Admelog SoloStar 100 units/mL injectable solution: Last Dose Taken:  , 18 unit(s) injectable once a day before lunch  · 	Admelog SoloStar 100 units/mL injectable solution: Last Dose Taken:  , 24 unit(s) injectable once a day before breakfast  · 	insulin glargine 100 units/mL subcutaneous solution: Last Dose Taken:  , 20 unit(s) subcutaneous once a day (at bedtime)  · 	valACYclovir 500 mg oral tablet: Last Dose Taken:  , 1 tab(s) orally every 12 hours  · 	Protonix 40 mg oral delayed release tablet: Last Dose Taken:  , 1 tab(s) orally once a day (in the morning)  · 	buPROPion 300 mg/24 hours (XL) oral tablet, extended release: Last Dose Taken:  , 1 tab(s) orally once a day  · 	Coreg 12.5 mg oral tablet: Last Dose Taken:  , 1 tab(s) orally every 12 hours  · 	hydrALAZINE 100 mg oral tablet: Last Dose Taken:  , 1 tab(s) orally every 8 hours  · 	tacrolimus 1 mg oral capsule: Last Dose Taken:  , 1 cap(s) orally 2 times a day starting on 2/14/25  · 	acetaminophen 325 mg oral tablet: Last Dose Taken:  , 2 tab(s) orally every 6 hours as needed  · 	glucose 40% oral gel: Last Dose Taken:  , 1 applicator orally 2 times a day as needed  · 	aspirin 81 mg oral delayed release tablet: Last Dose Taken:  , 1 tab(s) orally once a day  · 	insulin glargine 100 units/mL subcutaneous solution: Last Dose Taken:  , 10 unit(s) subcutaneous once a day (in the morning)  · 	levothyroxine 88 mcg (0.088 mg) oral tablet: Last Dose Taken:  , 1 tab(s) orally once a day  · 	escitalopram 10 mg oral tablet: Last Dose Taken:  , 1 tab(s) orally once a day  · 	cloNIDine 0.1 mg oral tablet: Last Dose Taken:  , 1 tab(s) orally 3 times a day hold for SBP less than 100, HR<60  · 	Crestor 10 mg oral tablet: Last Dose Taken:  , 1 tab(s) orally once a day (at bedtime)      MEDICATIONS  (STANDING):  aspirin enteric coated 81 milliGRAM(s) Oral daily  buPROPion XL (24-Hour) . 300 milliGRAM(s) Oral daily  chlorhexidine 2% Cloths 1 Application(s) Topical <User Schedule>  cloNIDine 0.1 milliGRAM(s) Oral three times a day  DAPTOmycin IVPB      DAPTOmycin IVPB 650 milliGRAM(s) IV Intermittent every 24 hours  dextrose 5%. 1000 milliLiter(s) (100 mL/Hr) IV Continuous <Continuous>  dextrose 5%. 1000 milliLiter(s) (50 mL/Hr) IV Continuous <Continuous>  dextrose 50% Injectable 25 Gram(s) IV Push once  dextrose 50% Injectable 12.5 Gram(s) IV Push once  dextrose 50% Injectable 25 Gram(s) IV Push once  ertapenem  IVPB 1000 milliGRAM(s) IV Intermittent every 24 hours  escitalopram 10 milliGRAM(s) Oral daily  glucagon  Injectable 1 milliGRAM(s) IntraMuscular once  heparin   Injectable 5000 Unit(s) SubCutaneous every 8 hours  hydrALAZINE 100 milliGRAM(s) Oral every 8 hours  insulin glargine Injectable (LANTUS) 18 Unit(s) SubCutaneous at bedtime  insulin lispro (ADMELOG) corrective regimen sliding scale   SubCutaneous three times a day before meals  insulin lispro (ADMELOG) corrective regimen sliding scale   SubCutaneous at bedtime  insulin lispro Injectable (ADMELOG) 10 Unit(s) SubCutaneous three times a day before meals  iron sucrose IVPB 200 milliGRAM(s) IV Intermittent every 24 hours  lactated ringers. 1000 milliLiter(s) (75 mL/Hr) IV Continuous <Continuous>  levothyroxine 88 MICROGram(s) Oral daily  melatonin 3 milliGRAM(s) Oral at bedtime  pantoprazole    Tablet 40 milliGRAM(s) Oral before breakfast  polyethylene glycol 3350 17 Gram(s) Oral two times a day  predniSONE   Tablet 5 milliGRAM(s) Oral daily  QUEtiapine 25 milliGRAM(s) Oral at bedtime  rosuvastatin 10 milliGRAM(s) Oral at bedtime  senna 2 Tablet(s) Oral at bedtime  tacrolimus 1 milliGRAM(s) Oral two times a day  trimethoprim  160 mG/sulfamethoxazole 800 mG 1 Tablet(s) Oral daily  valACYclovir 500 milliGRAM(s) Oral every 12 hours    MEDICATIONS  (PRN):  acetaminophen     Tablet .. 1000 milliGRAM(s) Oral every 6 hours PRN Temp greater or equal to 38C (100.4F), Mild Pain (1 - 3)  aluminum hydroxide/magnesium hydroxide/simethicone Suspension 30 milliLiter(s) Oral every 4 hours PRN Dyspepsia  dextrose Oral Gel 15 Gram(s) Oral once PRN Blood Glucose LESS THAN 70 milliGRAM(s)/deciliter  OLANZapine Injectable 2.5 milliGRAM(s) IntraMuscular every 6 hours PRN for agitation  ondansetron Injectable 4 milliGRAM(s) IV Push every 8 hours PRN Nausea and/or Vomiting      Allergies    No Known Allergies    Intolerances      Review of Systems:  Constitutional: No fever  Eyes: No blurry vision  Neuro: No tremors  HEENT: No pain  Cardiovascular: No chest pain, palpitations  Respiratory: No SOB, no cough  GI: No nausea, vomiting, abdominal pain  : No dysuria  Skin: no rash  Psych: no depression  Endocrine: no polyuria, polydipsia  Hem/lymph: no swelling  Osteoporosis: no fractures    ALL OTHER SYSTEMS REVIEWED AND NEGATIVE        PHYSICAL EXAM:  VITALS: T(C): 36.8 (03-18-25 @ 12:46)  T(F): 98.2 (03-18-25 @ 12:46), Max: 98.6 (03-17-25 @ 21:06)  HR: 78 (03-18-25 @ 12:46) (68 - 78)  BP: 162/78 (03-18-25 @ 12:46) (159/80 - 183/72)  RR:  (18 - 19)  SpO2:  (98% - 99%)  Wt(kg): --  GENERAL: NAD, well-groomed, well-developed  EYES: No proptosis, no lid lag, anicteric  HEENT:  Atraumatic, Normocephalic, moist mucous membranes  THYROID: Normal size, no palpable nodules  RESPIRATORY: Clear to auscultation bilaterally; No rales, rhonchi, wheezing, or rubs  CARDIOVASCULAR: Regular rate and rhythm; No murmurs; no peripheral edema  GI: Soft, nontender, non distended, normal bowel sounds  SKIN: Dry, intact, No rashes or lesions  MUSCULOSKELETAL: Full range of motion, normal strength  NEURO: sensation intact, extraocular movements intact, no tremor, normal reflexes  PSYCH: Alert and oriented x 3, normal affect, normal mood  CUSHING'S SIGNS: no striae    POCT Blood Glucose.: 293 mg/dL (03-18-25 @ 12:00)  POCT Blood Glucose.: 344 mg/dL (03-18-25 @ 08:31)  POCT Blood Glucose.: 306 mg/dL (03-17-25 @ 21:37)  POCT Blood Glucose.: 248 mg/dL (03-17-25 @ 16:53)  POCT Blood Glucose.: 244 mg/dL (03-17-25 @ 12:26)  POCT Blood Glucose.: 271 mg/dL (03-17-25 @ 08:29)  POCT Blood Glucose.: 375 mg/dL (03-16-25 @ 21:18)  POCT Blood Glucose.: 411 mg/dL (03-16-25 @ 16:59)  POCT Blood Glucose.: 379 mg/dL (03-16-25 @ 11:54)  POCT Blood Glucose.: 276 mg/dL (03-16-25 @ 08:19)  POCT Blood Glucose.: 363 mg/dL (03-16-25 @ 02:50)  POCT Blood Glucose.: 409 mg/dL (03-16-25 @ 00:14)  POCT Blood Glucose.: 376 mg/dL (03-15-25 @ 16:57)                            9.3    5.74  )-----------( 267      ( 18 Mar 2025 07:07 )             29.5       03-18    137  |  103  |  13  ----------------------------<  342[H]  4.3   |  24  |  0.75    eGFR: 99    Ca    9.5      03-18  Mg     1.4     03-18    TPro  5.9[L]  /  Alb  2.9[L]  /  TBili  0.2  /  DBili  x   /  AST  16  /  ALT  23  /  AlkPhos  91  03-18      Thyroid Function Tests:  03-13 @ 15:26 TSH 4.47 FreeT4 -- T3 -- Anti TPO -- Anti Thyroglobulin Ab -- TSI --              Radiology:

## 2025-03-18 NOTE — PROGRESS NOTE ADULT - PROBLEM SELECTOR PLAN 9
Hx of Type 2 DM (A1c 8.4% in Jan 2025) and steroid induced hyperglycemia during PJP tx course. Per rehab records, pt is on 20 units glargine in PM and 10 units glargine in AM and Admelog 24/18/14 premeals    Plan:  - 18U lantus bedtime. Ademlog 10mg TID premeals   - monitor FS TIDAC and bedtime  -A1c 10- endocrine consulted  - consistent carb diet

## 2025-03-18 NOTE — PROGRESS NOTE ADULT - SUBJECTIVE AND OBJECTIVE BOX
Patient is a 69y old  Male who presents with a chief complaint of Toxic metabolic encephalopathy, sepsis 2/2 UTI (18 Mar 2025 18:30)        SUBJECTIVE / OVERNIGHT EVENTS: Patient had no acute events overnight. Patient seen and examined at bedside this morning. Denies buttock pain, diarrhea or urinary issues.     ROS:     MEDICATIONS  (STANDING):  aspirin enteric coated 81 milliGRAM(s) Oral daily  buPROPion XL (24-Hour) . 300 milliGRAM(s) Oral daily  chlorhexidine 2% Cloths 1 Application(s) Topical <User Schedule>  cloNIDine 0.1 milliGRAM(s) Oral three times a day  dextrose 5%. 1000 milliLiter(s) (100 mL/Hr) IV Continuous <Continuous>  dextrose 5%. 1000 milliLiter(s) (50 mL/Hr) IV Continuous <Continuous>  dextrose 50% Injectable 25 Gram(s) IV Push once  dextrose 50% Injectable 12.5 Gram(s) IV Push once  dextrose 50% Injectable 25 Gram(s) IV Push once  escitalopram 10 milliGRAM(s) Oral daily  glucagon  Injectable 1 milliGRAM(s) IntraMuscular once  heparin   Injectable 5000 Unit(s) SubCutaneous every 8 hours  hydrALAZINE 100 milliGRAM(s) Oral every 8 hours  insulin glargine Injectable (LANTUS) 18 Unit(s) SubCutaneous at bedtime  insulin lispro (ADMELOG) corrective regimen sliding scale   SubCutaneous three times a day before meals  insulin lispro (ADMELOG) corrective regimen sliding scale   SubCutaneous at bedtime  insulin lispro Injectable (ADMELOG) 10 Unit(s) SubCutaneous three times a day before meals  iron sucrose IVPB 200 milliGRAM(s) IV Intermittent every 24 hours  lactated ringers. 1000 milliLiter(s) (75 mL/Hr) IV Continuous <Continuous>  levothyroxine 88 MICROGram(s) Oral daily  melatonin 3 milliGRAM(s) Oral at bedtime  pantoprazole    Tablet 40 milliGRAM(s) Oral before breakfast  polyethylene glycol 3350 17 Gram(s) Oral two times a day  predniSONE   Tablet 5 milliGRAM(s) Oral daily  QUEtiapine 25 milliGRAM(s) Oral at bedtime  rosuvastatin 10 milliGRAM(s) Oral at bedtime  senna 2 Tablet(s) Oral at bedtime  tacrolimus 1.5 milliGRAM(s) Oral two times a day  trimethoprim  160 mG/sulfamethoxazole 800 mG 1 Tablet(s) Oral daily  valACYclovir 500 milliGRAM(s) Oral every 12 hours  vancomycin  IVPB 1000 milliGRAM(s) IV Intermittent every 12 hours    MEDICATIONS  (PRN):  acetaminophen     Tablet .. 1000 milliGRAM(s) Oral every 6 hours PRN Temp greater or equal to 38C (100.4F), Mild Pain (1 - 3)  aluminum hydroxide/magnesium hydroxide/simethicone Suspension 30 milliLiter(s) Oral every 4 hours PRN Dyspepsia  dextrose Oral Gel 15 Gram(s) Oral once PRN Blood Glucose LESS THAN 70 milliGRAM(s)/deciliter  OLANZapine Injectable 2.5 milliGRAM(s) IntraMuscular every 6 hours PRN for agitation  ondansetron Injectable 4 milliGRAM(s) IV Push every 8 hours PRN Nausea and/or Vomiting      Vital Signs Last 24 Hrs  T(C): 36.8 (18 Mar 2025 12:46), Max: 37 (17 Mar 2025 21:06)  T(F): 98.2 (18 Mar 2025 12:46), Max: 98.6 (17 Mar 2025 21:06)  HR: 78 (18 Mar 2025 12:46) (73 - 78)  BP: 162/78 (18 Mar 2025 12:46) (162/78 - 183/72)  BP(mean): --  RR: 18 (18 Mar 2025 12:46) (18 - 19)  SpO2: 99% (18 Mar 2025 12:46) (98% - 99%)    Parameters below as of 18 Mar 2025 12:46  Patient On (Oxygen Delivery Method): room air      CAPILLARY BLOOD GLUCOSE      POCT Blood Glucose.: 309 mg/dL (18 Mar 2025 16:52)  POCT Blood Glucose.: 293 mg/dL (18 Mar 2025 12:00)  POCT Blood Glucose.: 344 mg/dL (18 Mar 2025 08:31)  POCT Blood Glucose.: 306 mg/dL (17 Mar 2025 21:37)    I&O's Summary    17 Mar 2025 07:01  -  18 Mar 2025 07:00  --------------------------------------------------------  IN: 0 mL / OUT: 910 mL / NET: -910 mL    18 Mar 2025 07:01  -  18 Mar 2025 19:56  --------------------------------------------------------  IN: 0 mL / OUT: 900 mL / NET: -900 mL        PHYSICAL EXAM  GENERAL: NAD, lying comfortably in bed   HEENT:  Atraumatic, Normocephalic, EOMI, conjunctiva and sclera clear, no LAD  CHEST/LUNG: Clear to auscultation bilaterally; No wheeze  HEART: RRR, S1 and S2 No murmurs, rubs, or gallops  ABDOMEN: Soft, Nontender, Nondistended; Bowel sounds present  EXTREMITIES:  2+ Peripheral Pulses, No clubbing, cyanosis, or edema  NEURO: AAOx1-2 (hospital and name), non-focal  SKIN: No rashes or lesions    LABS:                        9.3    5.74  )-----------( 267      ( 18 Mar 2025 07:07 )             29.5     03-18    137  |  103  |  13  ----------------------------<  342[H]  4.3   |  24  |  0.75    Ca    9.5      18 Mar 2025 07:08  Mg     1.4     03-18    TPro  5.9[L]  /  Alb  2.9[L]  /  TBili  0.2  /  DBili  x   /  AST  16  /  ALT  23  /  AlkPhos  91  03-18          Urinalysis Basic - ( 18 Mar 2025 07:08 )    Color: x / Appearance: x / SG: x / pH: x  Gluc: 342 mg/dL / Ketone: x  / Bili: x / Urobili: x   Blood: x / Protein: x / Nitrite: x   Leuk Esterase: x / RBC: x / WBC x   Sq Epi: x / Non Sq Epi: x / Bacteria: x          RADIOLOGY & ADDITIONAL TESTS:      Labs Personally Reviewed  Imaging Personally Reviewed  Consultant(s) Notes Reviewed

## 2025-03-18 NOTE — PROGRESS NOTE ADULT - PROBLEM SELECTOR PLAN 6
Plan:  -c/w tacro 1.5 mg BID and f/u tacro level before AM dose  - holding mycophenalate  - consulted transplant nephrology   -c/w valacyclovir 500 mg q12 for ppx  -c/w bactrim DS ppx    #Hx of PJP Pna  -CT chest- improved opacities  -f/u fungtiell and ldh

## 2025-03-19 DIAGNOSIS — E11.65 TYPE 2 DIABETES MELLITUS WITH HYPERGLYCEMIA: ICD-10-CM

## 2025-03-19 LAB
ALBUMIN SERPL ELPH-MCNC: 2.8 G/DL — LOW (ref 3.3–5)
ALP SERPL-CCNC: 82 U/L — SIGNIFICANT CHANGE UP (ref 40–120)
ALT FLD-CCNC: 19 U/L — SIGNIFICANT CHANGE UP (ref 10–45)
ANION GAP SERPL CALC-SCNC: 11 MMOL/L — SIGNIFICANT CHANGE UP (ref 5–17)
APTT BLD: 36.7 SEC — HIGH (ref 24.5–35.6)
AST SERPL-CCNC: 18 U/L — SIGNIFICANT CHANGE UP (ref 10–40)
BILIRUB SERPL-MCNC: 0.2 MG/DL — SIGNIFICANT CHANGE UP (ref 0.2–1.2)
CALCIUM SERPL-MCNC: 9.8 MG/DL — SIGNIFICANT CHANGE UP (ref 8.4–10.5)
CHLORIDE SERPL-SCNC: 104 MMOL/L — SIGNIFICANT CHANGE UP (ref 96–108)
CO2 SERPL-SCNC: 24 MMOL/L — SIGNIFICANT CHANGE UP (ref 22–31)
CREAT SERPL-MCNC: 0.88 MG/DL — SIGNIFICANT CHANGE UP (ref 0.5–1.3)
EBV DNA SERPL NAA+PROBE-ACNC: SIGNIFICANT CHANGE UP IU/ML
EBVPCR LOG: SIGNIFICANT CHANGE UP LOG10IU/ML
EGFR: 94 ML/MIN/1.73M2 — SIGNIFICANT CHANGE UP
GLUCOSE BLDC GLUCOMTR-MCNC: 126 MG/DL — HIGH (ref 70–99)
GLUCOSE BLDC GLUCOMTR-MCNC: 182 MG/DL — HIGH (ref 70–99)
GLUCOSE BLDC GLUCOMTR-MCNC: 187 MG/DL — HIGH (ref 70–99)
GLUCOSE BLDC GLUCOMTR-MCNC: 199 MG/DL — HIGH (ref 70–99)
GLUCOSE BLDC GLUCOMTR-MCNC: 217 MG/DL — HIGH (ref 70–99)
GLUCOSE BLDC GLUCOMTR-MCNC: 257 MG/DL — HIGH (ref 70–99)
GLUCOSE SERPL-MCNC: 201 MG/DL — HIGH (ref 70–99)
HGB BLD-MCNC: 9.3 G/DL — LOW (ref 13–17)
IGG FLD-MCNC: 670 MG/DL — SIGNIFICANT CHANGE UP (ref 610–1660)
IGG1 SER-MCNC: 388 MG/DL — SIGNIFICANT CHANGE UP (ref 240–1118)
IGG2 SER-MCNC: 237 MG/DL — SIGNIFICANT CHANGE UP (ref 124–549)
IGG3 SER-MCNC: 21.6 MG/DL — SIGNIFICANT CHANGE UP (ref 15–102)
IGG4 SER-MCNC: 22.4 MG/DL — SIGNIFICANT CHANGE UP (ref 1–123)
INR BLD: 0.94 RATIO — SIGNIFICANT CHANGE UP (ref 0.85–1.16)
MAGNESIUM SERPL-MCNC: 1.5 MG/DL — LOW (ref 1.6–2.6)
MCHC RBC-ENTMCNC: 31.7 PG — SIGNIFICANT CHANGE UP (ref 27–34)
MCHC RBC-ENTMCNC: 32.1 G/DL — SIGNIFICANT CHANGE UP (ref 32–36)
MCV RBC AUTO: 99 FL — SIGNIFICANT CHANGE UP (ref 80–100)
NRBC BLD AUTO-RTO: 0 /100 WBCS — SIGNIFICANT CHANGE UP (ref 0–0)
PLATELET # BLD AUTO: 264 K/UL — SIGNIFICANT CHANGE UP (ref 150–400)
POTASSIUM SERPL-MCNC: 4.4 MMOL/L — SIGNIFICANT CHANGE UP (ref 3.5–5.3)
POTASSIUM SERPL-SCNC: 4.4 MMOL/L — SIGNIFICANT CHANGE UP (ref 3.5–5.3)
PROT SERPL-MCNC: 5.6 G/DL — LOW (ref 6–8.3)
PROTHROM AB SERPL-ACNC: 10.8 SEC — SIGNIFICANT CHANGE UP (ref 9.9–13.4)
RBC # FLD: 16.1 % — HIGH (ref 10.3–14.5)
SODIUM SERPL-SCNC: 139 MMOL/L — SIGNIFICANT CHANGE UP (ref 135–145)
TACROLIMUS SERPL-MCNC: 2.4 NG/ML — SIGNIFICANT CHANGE UP
TSH SERPL-MCNC: 1.94 UIU/ML — SIGNIFICANT CHANGE UP (ref 0.27–4.2)
WBC # BLD: 6.04 K/UL — SIGNIFICANT CHANGE UP (ref 3.8–10.5)
WBC # FLD AUTO: 6.04 K/UL — SIGNIFICANT CHANGE UP (ref 3.8–10.5)

## 2025-03-19 PROCEDURE — 99232 SBSQ HOSP IP/OBS MODERATE 35: CPT

## 2025-03-19 PROCEDURE — 99233 SBSQ HOSP IP/OBS HIGH 50: CPT

## 2025-03-19 PROCEDURE — G0545: CPT

## 2025-03-19 RX ORDER — MAGNESIUM SULFATE 500 MG/ML
2 SYRINGE (ML) INJECTION ONCE
Refills: 0 | Status: COMPLETED | OUTPATIENT
Start: 2025-03-19 | End: 2025-03-19

## 2025-03-19 RX ORDER — INSULIN GLARGINE-YFGN 100 [IU]/ML
20 INJECTION, SOLUTION SUBCUTANEOUS AT BEDTIME
Refills: 0 | Status: DISCONTINUED | OUTPATIENT
Start: 2025-03-19 | End: 2025-03-20

## 2025-03-19 RX ORDER — INSULIN LISPRO 100 U/ML
16 INJECTION, SOLUTION INTRAVENOUS; SUBCUTANEOUS
Refills: 0 | Status: DISCONTINUED | OUTPATIENT
Start: 2025-03-19 | End: 2025-03-20

## 2025-03-19 RX ORDER — INSULIN LISPRO 100 U/ML
15 INJECTION, SOLUTION INTRAVENOUS; SUBCUTANEOUS
Refills: 0 | Status: DISCONTINUED | OUTPATIENT
Start: 2025-03-19 | End: 2025-03-19

## 2025-03-19 RX ADMIN — Medication 1 TABLET(S): at 08:48

## 2025-03-19 RX ADMIN — Medication 100 MILLIGRAM(S): at 20:54

## 2025-03-19 RX ADMIN — QUETIAPINE FUMARATE 25 MILLIGRAM(S): 25 TABLET ORAL at 20:53

## 2025-03-19 RX ADMIN — Medication 0.1 MILLIGRAM(S): at 06:46

## 2025-03-19 RX ADMIN — INSULIN LISPRO 10 UNIT(S): 100 INJECTION, SOLUTION INTRAVENOUS; SUBCUTANEOUS at 08:45

## 2025-03-19 RX ADMIN — Medication 100 MILLIGRAM(S): at 13:01

## 2025-03-19 RX ADMIN — INSULIN LISPRO 15 UNIT(S): 100 INJECTION, SOLUTION INTRAVENOUS; SUBCUTANEOUS at 12:05

## 2025-03-19 RX ADMIN — Medication 0.1 MILLIGRAM(S): at 13:01

## 2025-03-19 RX ADMIN — Medication 1 APPLICATION(S): at 06:59

## 2025-03-19 RX ADMIN — Medication 100 MILLIGRAM(S): at 06:46

## 2025-03-19 RX ADMIN — Medication 250 MILLIGRAM(S): at 18:52

## 2025-03-19 RX ADMIN — Medication 25 GRAM(S): at 19:03

## 2025-03-19 RX ADMIN — TACROLIMUS 1.5 MILLIGRAM(S): 0.5 CAPSULE ORAL at 08:45

## 2025-03-19 RX ADMIN — ESCITALOPRAM OXALATE 10 MILLIGRAM(S): 20 TABLET ORAL at 08:47

## 2025-03-19 RX ADMIN — Medication 250 MILLIGRAM(S): at 06:48

## 2025-03-19 RX ADMIN — INSULIN LISPRO 6: 100 INJECTION, SOLUTION INTRAVENOUS; SUBCUTANEOUS at 08:44

## 2025-03-19 RX ADMIN — INSULIN LISPRO 2: 100 INJECTION, SOLUTION INTRAVENOUS; SUBCUTANEOUS at 12:04

## 2025-03-19 RX ADMIN — ROSUVASTATIN CALCIUM 10 MILLIGRAM(S): 20 TABLET, FILM COATED ORAL at 20:53

## 2025-03-19 RX ADMIN — Medication 81 MILLIGRAM(S): at 08:47

## 2025-03-19 RX ADMIN — Medication 500 MILLIGRAM(S): at 18:52

## 2025-03-19 RX ADMIN — BUPROPION HYDROBROMIDE 300 MILLIGRAM(S): 522 TABLET, EXTENDED RELEASE ORAL at 08:47

## 2025-03-19 RX ADMIN — ENOXAPARIN SODIUM 40 MILLIGRAM(S): 100 INJECTION SUBCUTANEOUS at 12:05

## 2025-03-19 RX ADMIN — Medication 500 MILLIGRAM(S): at 06:46

## 2025-03-19 RX ADMIN — Medication 88 MICROGRAM(S): at 06:46

## 2025-03-19 RX ADMIN — INSULIN LISPRO 16 UNIT(S): 100 INJECTION, SOLUTION INTRAVENOUS; SUBCUTANEOUS at 18:56

## 2025-03-19 RX ADMIN — POLYETHYLENE GLYCOL 3350 17 GRAM(S): 17 POWDER, FOR SOLUTION ORAL at 18:53

## 2025-03-19 RX ADMIN — Medication 2 TABLET(S): at 20:54

## 2025-03-19 RX ADMIN — Medication 40 MILLIGRAM(S): at 06:46

## 2025-03-19 RX ADMIN — Medication 3 MILLIGRAM(S): at 20:53

## 2025-03-19 RX ADMIN — POLYETHYLENE GLYCOL 3350 17 GRAM(S): 17 POWDER, FOR SOLUTION ORAL at 06:50

## 2025-03-19 RX ADMIN — INSULIN GLARGINE-YFGN 20 UNIT(S): 100 INJECTION, SOLUTION SUBCUTANEOUS at 21:54

## 2025-03-19 RX ADMIN — TACROLIMUS 1.5 MILLIGRAM(S): 0.5 CAPSULE ORAL at 20:53

## 2025-03-19 RX ADMIN — Medication 0.1 MILLIGRAM(S): at 22:07

## 2025-03-19 RX ADMIN — PREDNISONE 5 MILLIGRAM(S): 20 TABLET ORAL at 06:46

## 2025-03-19 NOTE — PROGRESS NOTE ADULT - PROBLEM SELECTOR PLAN 2
TSH 4.47 march 13th  Thyroid Stimulating Hormone, Serum: 4.47 uIU/mL (03.13.25 @ 15:26)    - Continue with Levothyroxine 88mcg  - Make sure LT4 it is given 1 hr prior to food and 4 hrs apart from calcium iron or PPI  - Repeat TSH in 4 weeks outpt

## 2025-03-19 NOTE — PROGRESS NOTE ADULT - PROBLEM SELECTOR PLAN 8
Previous admission in Jan- Feb 2025 for R mini chop cycle 2, but chemo held at the time due to AHRF c/f PJP PNA, completed atovaquone/pred course.  polatuzumab-revlimid-rituximab    Plan:  -c/w bactrim DS daily for PJP ppx  -Heme consulted- to be transfer to [facility] for chemo

## 2025-03-19 NOTE — PROGRESS NOTE ADULT - ASSESSMENT
69 year old M with a history of renal transplant (2012) on tacrolimus, s/p left nephrectomy, DLBCL s/p C1 R mini CHOP (chemo held 2/2 recent infections), peripheral neuropathy, hypothyroidism, retroperitoneal fibrosis encasing veins, HTN, HLD, GERD, anxiety/depression, ANGELIKA and recent admission at [facility] (1/17 - 2/6/25) for sepsis 2/2 Klebsiella pneumoniae ESBL UTI and AHRF c/f PJP pneumonia (s/p completion of atovaquone and pred course) pw unwitnessed fall, in setting of sepsis likely 2/2 UTI and toxic metabolic encephalopathy, CT A/P w severe proctocolitis and cystitis. Endocrine consulted for management of uncontrolled T2DM. Patient came from rehab, was d/c from last admission. Patient reports feeling okay, eating mostly full meals and tolerating POs. FBG elevated and noted persistent postprandial hyperglycemia, will increase insulin regimen for tighter BG control. No hypoglycemia. Patient continues on oral Pred 5mg QD (home dose). Endocrine will closely monitor BG and adjust insulin as needed for BG goal 100-180mg/dL inpatient.       #Type 2 diabetes mellitus   A1C with Estimated Average Glucose Result: 10.1 % (03-14-25 @ 07:08)  A1C with Estimated Average Glucose Result: 9.6 % (03-13-25 @ 15:26)  A1C with Estimated Average Glucose Result: 8.4 % (01-28-25 @ 07:38)  A1C with Estimated Average Glucose Result: 7.8 % (12-29-24 @ 07:07)  A1C with Estimated Average Glucose Result: 7.7 % (12-28-24 @ 10:06)    Regimen in rehab in Feb previous hospitalization/rehab: Lantus 38u QHS and Admelog 10u TID AC, uses Dexcom G6  Rehab DM management after recent d/c on 2/6:   - While on prednisone 20mg daily pt was on Lantus 11 units QHS and Admelog 24 units with breakfast, 18units with lunch and 14 units with dinner,  Per rehab records, pt is on 20 units glargine in PM and 10 units glargine in AM and Admelog 24/18/14 premeals.               69 year old M with a history of renal transplant (2012) on tacrolimus, s/p left nephrectomy, DLBCL s/p C1 R mini CHOP (chemo held 2/2 recent infections), peripheral neuropathy, hypothyroidism, retroperitoneal fibrosis encasing veins, HTN, HLD, GERD, anxiety/depression, ANGELIKA and recent admission at [facility] (1/17 - 2/6/25) for sepsis 2/2 Klebsiella pneumoniae ESBL UTI and AHRF c/f PJP pneumonia (s/p completion of atovaquone and pred course) pw unwitnessed fall, in setting of sepsis likely 2/2 UTI and toxic metabolic encephalopathy, CT A/P w severe proctocolitis and cystitis. Endocrine consulted for management of uncontrolled T2DM. Patient came from rehab, was d/c from last admission. Patient reports feeling okay, eating mostly full meals and tolerating POs. FBG elevated and noted persistent postprandial hyperglycemia, will increase insulin regimen for tighter BG control. No hypoglycemia. Patient continues on oral Pred 5mg QD (home dose). Endocrine will closely monitor BG and adjust insulin as needed for BG goal 100-180mg/dL inpatient.       #Type 2 diabetes mellitus   A1C with Estimated Average Glucose Result: 10.1 % (03-14-25 @ 07:08)  A1C with Estimated Average Glucose Result: 9.6 % (03-13-25 @ 15:26)  A1C with Estimated Average Glucose Result: 8.4 % (01-28-25 @ 07:38)  A1C with Estimated Average Glucose Result: 7.8 % (12-29-24 @ 07:07)  A1C with Estimated Average Glucose Result: 7.7 % (12-28-24 @ 10:06)    Endocrinologist: Dr. Romero  Regimen in rehab in Feb previous hospitalization/rehab: Lantus 38u QHS and Admelog 10u TID AC, uses Dexcom G6  Rehab DM management after recent d/c on 2/6:   - While on prednisone 20mg daily pt was on Lantus 11 units QHS and Admelog 24 units with breakfast, 18units with lunch and 14 units with dinner,  Per rehab records, pt is on 20 units glargine in PM and 10 units glargine in AM and Admelog 24/18/14 premeals.

## 2025-03-19 NOTE — PROGRESS NOTE ADULT - ATTENDING COMMENTS
I independently interviewed and examined JAN CALVIN. I reviewed and discussed the case with Dr Cheatham  and agree with his/her assessment and recommendations with the following additions:    MRI with non specific findings of the iliac bone and sacrum , bone marrow signal though appears unchanged since MRI of 12/2024. At the time had ESBL Ecoli from urinary infection and bacteremia, treated for 10 days only, NOT for osteomyelitis, and thus, the lack of progression speaks against infection in this case      continue initial plan for 7 days of vanco--> dapto--> vancomycin for Staph hominis bacteremia ??    off ertapenem     noted fungitell repeated and now at 393 from >500 back in january, Ct Chest with resolving infilgtrates no clear nodule but fungityell should have come down (if truly positive) with PCP treatemtn and resolution of infiltrates. So still unclear significance, but consisder posa ppx as we continue chemotherapy + tacro-  No IVIG use recenly, exposure to azithromycin and daptomycin- unclear if these could have contributed but have been quoted in literature as potential associations, so now he is off, we will repeat fungitell    Please proceed with next cycle of Chao-rituximab-revlimid    While on rituximab, check Weekly CMV PCR or consider valcyte ppx in this solid organ transplant patient - check CMV IGG as we do not know his serostatus from renal transplant in 2012        Today patient is aggressive and insulting  presumably MS is better since admission , No brain imaging    Ct head 'No CT evidence for acute demarcated territorial infarction No   intracranial hemorrhages, midline shift or mass effect is demonstrated.   Mild parenchymal volume loss with prominent sulci and lateral ventricles.   Patchy hypoattenuation in the deep cerebral white matter, nonspecific but   statistically favoring chronic microvascular changes. Benign   mineralization in the basal ganglia bilaterally.    Ventricles are normal in size and configuration. The perimesencephalic   cisterns are intact. No hydrocephalus.    No abnormal accumulation in extra-axial spaces.    Partially visualized paranasal sinuses are clear. Mastoids are patent.   Intraocular lens replacement bilaterally. Calvarium is intact."          Thank you for involving us in the care of this patient  Transplant ID will continue to follow  Please call or page with additional questions  Pager; #[phone]  Teams: from 8 am to 5 pm  Suzie Argueta MD

## 2025-03-19 NOTE — PROGRESS NOTE ADULT - ASSESSMENT
69M with hx of recently diagnosed DLBCL on polatuzumab-revlimid-rituximab, advanced PTLD, RP fibrosis, DM, HTN, HLD, ESRD on HD s/p renal transplant 2012 (from brother) on tacro, hypothyroidism, recurrent infections (sacral OM, ESBL UTI), admitted for sepsis.     #DLBCL, Stage IV  - diagnosed on liver biopsy 12/16/24 Diffuse large B-cell lymphoma, germinal center B-cell subtype with high proliferation index, SWATHI-negative, Posttransplant (kidney).   - [place] stage IV as pt has extranodal involvement  -CT A/P on admission with response above and below the diaphragm, effectively in CR1  Treatment hx:  	-12/29/2024: R-miniCHOP x1 with course c/b severe PJP infection so cycle 2 not given   	-2/13/2025: C1 Rituximab/polatuzumab/Revlimid (10mg days 1-14) on a 21-day schedule.  -Started sunil-R2 Cycle 2 on 2/27. Today 3/18/25 is C2D20    #Sepsis   -Bcx x2 on 3/13 with Staph hominis+staph haemolyticus, vanc and tetracycline sensitive. Per discussion with ID fellow, this may be contaminant as no apparent source and MRI pelvis negative for osteomyelitis  -repeat BCx on 3/16 NGTD  -GI PCR 3/14 Campylobacter  -MR pelvis pending given hx of sacral osteomyelitis    Recommend:  -transfuse for Hb>7 and Plt>10  - f/u IgG level  - Please transfer patient to [facility]. C3 sunil-rituximab-revlimid to be given tentatively Thursday 3/20/25  -c/w ASA as pt is on revlimid  -c/w bactrim DS daily, valtrex 500mg BID  -on tacro for renal transplant, redosing pending levels  -daily CBC with diff, uric acid, LDH, fibrinogen  -empiric antibiotics per primary team and ID: on IV vanc  -PET/CT after cycle 3  -[facility]/leukemia service to follow.    Seen and discussed with attending Dr Jose Peng MD PGY-4  Hematology/Oncology Fellow  Available on MS TEAMS  Pagers: TYORNE [phone]; [facility] [phone]    **For any questions please check CHERYL on call or [facility] schedule for heme fellow on call. 69M with hx of recently diagnosed DLBCL on polatuzumab-revlimid-rituximab, advanced PTLD, RP fibrosis, DM, HTN, HLD, ESRD on HD s/p renal transplant 2012 (from brother) on tacro, hypothyroidism, recurrent infections (sacral OM, ESBL UTI), admitted for sepsis.     #DLBCL, Stage IV  - diagnosed on liver biopsy 12/16/24 Diffuse large B-cell lymphoma, germinal center B-cell subtype with high proliferation index, SWATHI-negative, Posttransplant (kidney).   - Lug[place] stage IV as pt has extranodal involvement  -CT A/P on admission with response above and below the diaphragm, effectively in CR1  Treatment hx:  	-12/29/2024: R-miniCHOP x1 with course c/b severe PJP infection so cycle 2 not given   	-2/13/2025: C1 Rituximab/polatuzumab/Revlimid (10mg days 1-14) on a 21-day schedule.  -Started sunil-R2 Cycle 2 on 2/27. Today 3/19/25 is C2D21    #Sepsis   -Bcx x2 on 3/13 with Staph hominis+staph haemolyticus, vanc and tetracycline sensitive. Per discussion with ID fellow, this may be contaminant as no apparent source and MRI pelvis negative for osteomyelitis  -repeat BCx on 3/16 NGTD  -GI PCR 3/14 Campylobacter  -MR pelvis pending given hx of sacral osteomyelitis    Recommend:  -transfuse for Hb>7 and Plt>10  - f/u IgG level  - Please transfer patient to [facility]. C3 sunil-rituximab-revlimid to be given tentatively Thursday 3/20/25  -c/w ASA as pt is on revlimid  -c/w bactrim DS daily, valtrex 500mg BID  -on tacro for renal transplant, redosing pending levels  -daily CBC with diff, uric acid, LDH, fibrinogen  -empiric antibiotics per primary team and ID: on IV vanc  -PET/CT after cycle 3  -[facility]/leukemia service to follow.    Seen and discussed with attending Dr Jose Peng MD PGY-4  Hematology/Oncology Fellow  Available on MS TEAMS  Pagers: TYRONE [phone]; [facility] [phone]    **For any questions please check CHERYL on call or [facility] schedule for heme fellow on call.

## 2025-03-19 NOTE — PROGRESS NOTE ADULT - ASSESSMENT
69 year old M with a history of renal transplant (2012) on tacrolimus, s/p left nephrectomy, DLBCL s/p C1 R mini CHOP (chemo held 2/2 recent infections), peripheral neuropathy, hypothyroidism, retroperitoneal fibrosis encasing veins, HTN, HLD, GERD, anxiety/depression, ANGELIKA and recent admission at [facility] (1/17 - 2/6/25) for sepsis 2/2 Klebsiella pneumoniae ESBL UTI and AHRF c/f PJP pneumonia (s/p completion of atovaquone and pred course) presenting after unwitnessed fall at his rehab facility.     On assessment patient oriented to self and place but incoherent and verbally aggressive w tangential thoughts, refusing exam and assessment and unable to answer history questions. Details regarding the fall are limited as collateral information limited. Attempted to call patient's wife who is the emergency contact but was not able to reach. Patient's son was contacted who reports his father "acts this way" when he has infections but was unsure about preceding events.     On arrival febrile to 100.7 rectally, /65, HR 88, RR 18, Sat 100% on RA. RVP neg. UA w mod leuk esterase, neg nitrite, 0-2 wbc. CT head/ c spine neg for acute territorial infarct, hematoma or mass effect. No acute fracture or subluxation of the cervical spine. CT A/P with w severe proctocolitis and cystitis. Received Vanc and zosyn x1, 1 L LR bolus,     Ct C/A/p 3/13 diffusese rectal and sigmoid wall likely due to a severe proctocolitis.Mild bladder wall thickening with adjacent fat stranding, likely due to cystitis.  Low-attenuation lesions within the spleen and central liver, decreased in size from 12/9/2024 compatible positive response to treatment.  Unchanged retroperitoneal soft tissue encasing the aorta and IVC, which could be due to post transplant lymphoproliferative disease given the history of prior kidney transplant or retroperitoneal fibrosis.  Persistent lucency and heterogeneity within the sacrum, which could be due to the patient's lymphoma, though ostium colitis could have a similar appearance in the appropriate clinical setting.  CT chest non-con 3/17: Near complete resolution prior bilateral lung opacities. Small left pleural effusion associated left pleural thickening with left lower lobe atelectasis/round atelectasis.      # s/p renal transplant 2012   # s/p left nephrectomy  # DLBCL  s/p C1 R mini CHOP (chemo held 2/2 recent infections)  # recent Multifocal pneumonia 1/2025 and fungitell positive treated empirically for PCP  # history of multiple prior transplant related UTIs  # high grade coag negative staph bacteremia  3/4 bottles S hominis, marleny 1/4 S haemolyticus and 1/4 S epi    BC 3/13 pos, Repeat BC 3/16 NGTD  vancomycin 3/13--> daptomycin 3/14-   # candiduria  UA 3/13 0-2 WBC no RBC  UC 3/13 catheterized 10-50K c albicans  # proctocolitis on Ct, Gi PCr positive for   # campylobacter   CMv PCr neg  EBV PCr, pending collection  # erosive changes of the sacrum with no sacral wound 1/23 on Ct A/P      Recommendations:  -UA blunt, UC with candiduria in c.o catheterized urine- please repeat microscopic UA and UC, for now, holding off on antifungals unless repeat testing positive  -Gi PCr positive for Campylobacter- ertapenem provides coverage so he has been effectively treated for this. S/p 5 days of ertapenem  -if diarrhea recurs, will need to consider colonoscopy  -Last admission with noted ? erosive sacral changes on Ct- given now with questionable bacteremia with CNS staph, favor MRI sacrum and sending ESR/CRP at baseline  -continue CNStaph coverage pending repeat BC 3/16-  revised dapto to  vancomycin 1 g q12h  for isolated CNstaph.   -send fungitell and LDH, aspergillus GM to ensure trended down after therapy for PCP  -Continue bactrim ppx lifelong    PLEASE DEFER ALL CHANGES IN PLAN UNTIL SIGNED BY ATTENDING. All recommendations are tentative pending Attending Attestation.    Haydee Cheatham DO, PGY-4   ID Fellow  [employer] Teams Preferred  After 5pm/weekends call [phone]   69 year old M with a history of renal transplant (2012) on tacrolimus, s/p left nephrectomy, DLBCL s/p C1 R mini CHOP (chemo held 2/2 recent infections), peripheral neuropathy, hypothyroidism, retroperitoneal fibrosis encasing veins, HTN, HLD, GERD, anxiety/depression, ANGELIKA and recent admission at [facility] (1/17 - 2/6/25) for sepsis 2/2 Klebsiella pneumoniae ESBL UTI and AHRF c/f PJP pneumonia (s/p completion of atovaquone and pred course) presenting after unwitnessed fall at his rehab facility.     On assessment patient oriented to self and place but incoherent and verbally aggressive w tangential thoughts, refusing exam and assessment and unable to answer history questions. Details regarding the fall are limited as collateral information limited. Attempted to call patient's wife who is the emergency contact but was not able to reach. Patient's son was contacted who reports his father "acts this way" when he has infections but was unsure about preceding events.     On arrival febrile to 100.7 rectally, /65, HR 88, RR 18, Sat 100% on RA. RVP neg. UA w mod leuk esterase, neg nitrite, 0-2 wbc. CT head/ c spine neg for acute territorial infarct, hematoma or mass effect. No acute fracture or subluxation of the cervical spine. CT A/P with w severe proctocolitis and cystitis. Received Vanc and zosyn x1, 1 L LR bolus,     Ct C/A/p 3/13 diffusese rectal and sigmoid wall likely due to a severe proctocolitis.Mild bladder wall thickening with adjacent fat stranding, likely due to cystitis.  Low-attenuation lesions within the spleen and central liver, decreased in size from 12/9/2024 compatible positive response to treatment.  Unchanged retroperitoneal soft tissue encasing the aorta and IVC, which could be due to post transplant lymphoproliferative disease given the history of prior kidney transplant or retroperitoneal fibrosis.  Persistent lucency and heterogeneity within the sacrum, which could be due to the patient's lymphoma, though ostium colitis could have a similar appearance in the appropriate clinical setting.  CT chest non-con 3/17: Near complete resolution prior bilateral lung opacities. Small left pleural effusion associated left pleural thickening with left lower lobe atelectasis/round atelectasis.  MRI 3/18:  Abnormal marrow signal throughout the sacrum and left iliac bone. The appearance is nonspecific and may reflect neoplasm or infiltrative process such as lymphoma, stress fractures, infection, and/or osteonecrosis. Comparison is made to prior MRI the pelvis dated 12/02/2024. There has been no significant interval change in the extent of marrow edema centered on the sacrum and left iliac bone, though evaluation is limited by extensive motion artifact on the MRI      # s/p renal transplant 2012   # s/p left nephrectomy  # DLBCL  s/p C1 R mini CHOP (chemo held 2/2 recent infections)  # recent Multifocal pneumonia 1/2025 and fungitell positive treated empirically for PCP - repeat fungitell 392, repeat .  # history of multiple prior transplant related UTIs  # high grade coag negative staph bacteremia  3/4 bottles S hominis, marleny 1/4 S haemolyticus and 1/4 S epi    BC 3/13 pos, Repeat BC 3/16 NGTD  vancomycin 3/13--> daptomycin 3/14-   # candiduria  UA 3/13 0-2 WBC no RBC  UC 3/13 catheterized 10-50K c albicans  # proctocolitis on Ct, Gi PCr positive for   # campylobacter   CMv PCr neg  EBV PCr, pending collection  # erosive changes of the sacrum with no sacral wound 1/23 on Ct A/P, MRI not suggestive of OM  #encephalopathy       Recommendations:  -Patient seems to have waxing and waning mental status, unclear what his baseline mental status is. Would consider CTH and CT maxillofacial   -UA blunt, UC with candiduria in c.o catheterized urine- please repeat microscopic UA and UC, for now, holding off on antifungals unless repeat testing positive  -Gi PCr positive for Campylobacter- ertapenem provides coverage so he has been effectively treated for this. S/p 5 days of ertapenem  -if diarrhea recurs, will need to consider colonoscopy  -Last admission with noted ? erosive sacral changes on Ct- given now with questionable bacteremia with CNS staph, favor MRI sacrum and sending ESR/CRP at baseline  -continue CNStaph coverage pending repeat BC 3/16-  revised dapto to  vancomycin 1 g q12h  for isolated CNstaph.   -Follow up aspergillus to ensure trended down after therapy for PCP  -Continue bactrim ppx lifelong    Case discussed with attending.  PLEASE DEFER ALL CHANGES IN PLAN UNTIL SIGNED BY ATTENDING. All recommendations are tentative pending Attending Attestation.      Haydee Cheatham DO, PGY-4   ID Fellow  [employer] Teams Preferred  After 5pm/weekends call [phone]

## 2025-03-19 NOTE — PROGRESS NOTE ADULT - PROBLEM SELECTOR PLAN 9
Hx of Type 2 DM (A1c 8.4% in Jan 2025) and steroid induced hyperglycemia during PJP tx course. Per rehab records, pt is on 20 units glargine in PM and 10 units glargine in AM and Admelog 24/18/14 premeals    Plan:  - 20U lantus bedtime. Ademlog 16mg TID premeals   - monitor FS TIDAC and bedtime  -A1c 10- endocrine recs  - consistent carb diet

## 2025-03-19 NOTE — PROGRESS NOTE ADULT - SUBJECTIVE AND OBJECTIVE BOX
Patient seen today for follow up inpatient Diabetes Mellitus management.    Chief Complaint: Type 2 Diabetes Mellitus     INTERVAL HX:  Patient seen in [facility] 5TOW 514 W1. Patient is alert and oriented, resting in bed. Patient reports feeling okay, is eating mostly full meals and tolerating POs. FBG elevated this am to 257mg/dL. Noted persistent postprandial hyperglycemia to 309mg/dL. No hypoglycemia. Continues on oral Prednisone 5mg once daily. Blood glucose levels in the last 24hrs have been 182-309mg/dL.     Review of Systems:  General: As above.  Respiratory: Denies any SOB, GRAY, or cough.  Gastrointestinal: Denies any n/v/d or abdominal pain.   Endocrine: Denies any polyuria, polydipsia, polyphagia, visual changes, or numbness in feet.     Allergies  No Known Allergies      Intolerances  None.       MEDICATIONS  (STANDING):  acetaminophen     Tablet .. 1000 milliGRAM(s) Oral every 6 hours PRN  aluminum hydroxide/magnesium hydroxide/simethicone Suspension 30 milliLiter(s) Oral every 4 hours PRN  aspirin enteric coated 81 milliGRAM(s) Oral daily  buPROPion XL (24-Hour) . 300 milliGRAM(s) Oral daily  chlorhexidine 2% Cloths 1 Application(s) Topical <User Schedule>  cloNIDine 0.1 milliGRAM(s) Oral three times a day  dextrose 5%. 1000 milliLiter(s) IV Continuous <Continuous>  dextrose 5%. 1000 milliLiter(s) IV Continuous <Continuous>  dextrose 50% Injectable 25 Gram(s) IV Push once  dextrose 50% Injectable 12.5 Gram(s) IV Push once  dextrose 50% Injectable 25 Gram(s) IV Push once  dextrose Oral Gel 15 Gram(s) Oral once PRN  enoxaparin Injectable 40 milliGRAM(s) SubCutaneous every 24 hours  escitalopram 10 milliGRAM(s) Oral daily  glucagon  Injectable 1 milliGRAM(s) IntraMuscular once  hydrALAZINE 100 milliGRAM(s) Oral every 8 hours  insulin glargine Injectable (LANTUS) 20 Unit(s) SubCutaneous at bedtime  insulin lispro (ADMELOG) corrective regimen sliding scale   SubCutaneous three times a day before meals  insulin lispro (ADMELOG) corrective regimen sliding scale   SubCutaneous at bedtime  insulin lispro Injectable (ADMELOG) 16 Unit(s) SubCutaneous three times a day before meals  lactated ringers. 1000 milliLiter(s) IV Continuous <Continuous>  levothyroxine 88 MICROGram(s) Oral daily  melatonin 3 milliGRAM(s) Oral at bedtime  OLANZapine Injectable 2.5 milliGRAM(s) IntraMuscular every 6 hours PRN  ondansetron Injectable 4 milliGRAM(s) IV Push every 8 hours PRN  pantoprazole    Tablet 40 milliGRAM(s) Oral before breakfast  polyethylene glycol 3350 17 Gram(s) Oral two times a day  predniSONE   Tablet 5 milliGRAM(s) Oral daily  QUEtiapine 25 milliGRAM(s) Oral at bedtime  rosuvastatin 10 milliGRAM(s) Oral at bedtime  senna 2 Tablet(s) Oral at bedtime  tacrolimus 1.5 milliGRAM(s) Oral two times a day  trimethoprim  160 mG/sulfamethoxazole 800 mG 1 Tablet(s) Oral daily  valACYclovir 500 milliGRAM(s) Oral every 12 hours  vancomycin  IVPB 1000 milliGRAM(s) IV Intermittent every 12 hours      dextrose 50% Injectable 25 Gram(s) IV Push once  dextrose 50% Injectable 12.5 Gram(s) IV Push once  dextrose 50% Injectable 25 Gram(s) IV Push once  dextrose Oral Gel 15 Gram(s) Oral once PRN  glucagon  Injectable 1 milliGRAM(s) IntraMuscular once  insulin glargine Injectable (LANTUS) 20 Unit(s) SubCutaneous at bedtime  insulin lispro (ADMELOG) corrective regimen sliding scale   SubCutaneous three times a day before meals  insulin lispro (ADMELOG) corrective regimen sliding scale   SubCutaneous at bedtime  insulin lispro Injectable (ADMELOG) 16 Unit(s) SubCutaneous three times a day before meals  levothyroxine 88 MICROGram(s) Oral daily  predniSONE   Tablet 5 milliGRAM(s) Oral daily  rosuvastatin 10 milliGRAM(s) Oral at bedtime      insulin lispro (ADMELOG) corrective regimen sliding scale   SubCutaneous three times a day before meals  insulin lispro (ADMELOG) corrective regimen sliding scale   SubCutaneous at bedtime  insulin lispro Injectable (ADMELOG) 16 Unit(s) SubCutaneous three times a day before meals      PHYSICAL EXAM:  VITALS:   T(C): 36.6 (03-19-25 @ 12:41), Max: 36.9 (03-19-25 @ 06:20)  HR: 71 (03-19-25 @ 12:41) (70 - 71)  BP: 147/75 (03-19-25 @ 12:41) (147/75 - 152/77)  RR: 18 (03-19-25 @ 12:41) (18 - 18)  SpO2: 97% (03-19-25 @ 12:41) (97% - 100%)    GENERAL: In no acute distress  Respiratory: Respirations unlabored  Extremities: Warm and dry, no edema  NEURO: Alert and oriented, appropriate     LABS:  POCT Blood Glucose.: 187 mg/dL (03-19-25 @ 11:59)  POCT Blood Glucose.: 257 mg/dL (03-19-25 @ 08:15)  POCT Blood Glucose.: 182 mg/dL (03-19-25 @ 02:57)  POCT Blood Glucose.: 252 mg/dL (03-18-25 @ 21:53)  POCT Blood Glucose.: 309 mg/dL (03-18-25 @ 16:52)  POCT Blood Glucose.: 293 mg/dL (03-18-25 @ 12:00)  POCT Blood Glucose.: 344 mg/dL (03-18-25 @ 08:31)  POCT Blood Glucose.: 306 mg/dL (03-17-25 @ 21:37)  POCT Blood Glucose.: 248 mg/dL (03-17-25 @ 16:53)  POCT Blood Glucose.: 244 mg/dL (03-17-25 @ 12:26)  POCT Blood Glucose.: 271 mg/dL (03-17-25 @ 08:29)  POCT Blood Glucose.: 375 mg/dL (03-16-25 @ 21:18)  POCT Blood Glucose.: 411 mg/dL (03-16-25 @ 16:59)                          9.3    6.04  )-----------( 264      ( 19 Mar 2025 07:12 )             29.0     03-19    139  |  104  |  14  ----------------------------<  201[H]  4.4   |  24  |  0.88    Ca    9.8      19 Mar 2025 07:12  Mg     1.5     03-19    TPro  5.6[L]  /  Alb  2.8[L]  /  TBili  0.2  /  DBili  x   /  AST  18  /  ALT  19  /  AlkPhos  82  03-19    LIVER FUNCTIONS - ( 19 Mar 2025 07:12 )  Alb: 2.8 g/dL / Pro: 5.6 g/dL / ALK PHOS: 82 U/L / ALT: 19 U/L / AST: 18 U/L / GGT: x           PT/INR - ( 19 Mar 2025 07:12 )   PT: 10.8 sec;   INR: 0.94 ratio         PTT - ( 19 Mar 2025 07:12 )  PTT:36.7 sec      Urinalysis Basic - ( 19 Mar 2025 07:12 )    Color: x / Appearance: x / SG: x / pH: x  Gluc: 201 mg/dL / Ketone: x  / Bili: x / Urobili: x   Blood: x / Protein: x / Nitrite: x   Leuk Esterase: x / RBC: x / WBC x   Sq Epi: x / Non Sq Epi: x / Bacteria: x        Culture - Blood (collected 16 Mar 2025 13:35)  Source: Blood Blood  Preliminary Report (18 Mar 2025 18:01):    No growth at 48 Hours      A1C with Estimated Average Glucose Result: A1C with Estimated Average Glucose Result: 10.1 % (03-14-25 @ 07:08)  A1C with Estimated Average Glucose Result: 9.6 % (03-13-25 @ 15:26)  A1C with Estimated Average Glucose Result: 8.4 % (01-28-25 @ 07:38)  A1C with Estimated Average Glucose Result: 7.8 % (12-29-24 @ 07:07)  A1C with Estimated Average Glucose Result: 7.7 % (12-28-24 @ 10:06)  A1C with Estimated Average Glucose Result: 7.4 % (11-30-24 @ 06:40)

## 2025-03-19 NOTE — PROGRESS NOTE ADULT - SUBJECTIVE AND OBJECTIVE BOX
Patient is a 69y old  Male who presents with a chief complaint of Toxic metabolic encephalopathy, sepsis 2/2 UTI (19 Mar 2025 13:15)        SUBJECTIVE / OVERNIGHT EVENTS: Patient had no acute events overnight. Patient seen and examined at bedside this morning. In pleasant mood. Denies pain. Informed that he is waiting a bed on [facility] for potential chemo with heme.     ROS:  MEDICATIONS  (STANDING):  aspirin enteric coated 81 milliGRAM(s) Oral daily  buPROPion XL (24-Hour) . 300 milliGRAM(s) Oral daily  chlorhexidine 2% Cloths 1 Application(s) Topical <User Schedule>  cloNIDine 0.1 milliGRAM(s) Oral three times a day  dextrose 5%. 1000 milliLiter(s) (100 mL/Hr) IV Continuous <Continuous>  dextrose 5%. 1000 milliLiter(s) (50 mL/Hr) IV Continuous <Continuous>  dextrose 50% Injectable 25 Gram(s) IV Push once  dextrose 50% Injectable 12.5 Gram(s) IV Push once  dextrose 50% Injectable 25 Gram(s) IV Push once  enoxaparin Injectable 40 milliGRAM(s) SubCutaneous every 24 hours  escitalopram 10 milliGRAM(s) Oral daily  glucagon  Injectable 1 milliGRAM(s) IntraMuscular once  hydrALAZINE 100 milliGRAM(s) Oral every 8 hours  insulin glargine Injectable (LANTUS) 20 Unit(s) SubCutaneous at bedtime  insulin lispro (ADMELOG) corrective regimen sliding scale   SubCutaneous three times a day before meals  insulin lispro (ADMELOG) corrective regimen sliding scale   SubCutaneous at bedtime  insulin lispro Injectable (ADMELOG) 16 Unit(s) SubCutaneous three times a day before meals  lactated ringers. 1000 milliLiter(s) (75 mL/Hr) IV Continuous <Continuous>  levothyroxine 88 MICROGram(s) Oral daily  magnesium sulfate  IVPB 2 Gram(s) IV Intermittent once  melatonin 3 milliGRAM(s) Oral at bedtime  pantoprazole    Tablet 40 milliGRAM(s) Oral before breakfast  polyethylene glycol 3350 17 Gram(s) Oral two times a day  predniSONE   Tablet 5 milliGRAM(s) Oral daily  QUEtiapine 25 milliGRAM(s) Oral at bedtime  rosuvastatin 10 milliGRAM(s) Oral at bedtime  senna 2 Tablet(s) Oral at bedtime  tacrolimus 1.5 milliGRAM(s) Oral two times a day  trimethoprim  160 mG/sulfamethoxazole 800 mG 1 Tablet(s) Oral daily  valACYclovir 500 milliGRAM(s) Oral every 12 hours  vancomycin  IVPB 1000 milliGRAM(s) IV Intermittent every 12 hours    MEDICATIONS  (PRN):  acetaminophen     Tablet .. 1000 milliGRAM(s) Oral every 6 hours PRN Temp greater or equal to 38C (100.4F), Mild Pain (1 - 3)  aluminum hydroxide/magnesium hydroxide/simethicone Suspension 30 milliLiter(s) Oral every 4 hours PRN Dyspepsia  dextrose Oral Gel 15 Gram(s) Oral once PRN Blood Glucose LESS THAN 70 milliGRAM(s)/deciliter  OLANZapine Injectable 2.5 milliGRAM(s) IntraMuscular every 6 hours PRN for agitation  ondansetron Injectable 4 milliGRAM(s) IV Push every 8 hours PRN Nausea and/or Vomiting      Vital Signs Last 24 Hrs  T(C): 36.3 (19 Mar 2025 15:33), Max: 36.9 (19 Mar 2025 06:20)  T(F): 97.4 (19 Mar 2025 15:33), Max: 98.4 (19 Mar 2025 06:20)  HR: 68 (19 Mar 2025 15:33) (68 - 71)  BP: 158/76 (19 Mar 2025 15:33) (147/75 - 158/76)  BP(mean): --  RR: 18 (19 Mar 2025 15:33) (18 - 18)  SpO2: 98% (19 Mar 2025 15:33) (97% - 100%)    Parameters below as of 19 Mar 2025 15:33  Patient On (Oxygen Delivery Method): room air      CAPILLARY BLOOD GLUCOSE      POCT Blood Glucose.: 187 mg/dL (19 Mar 2025 11:59)  POCT Blood Glucose.: 257 mg/dL (19 Mar 2025 08:15)  POCT Blood Glucose.: 182 mg/dL (19 Mar 2025 02:57)  POCT Blood Glucose.: 252 mg/dL (18 Mar 2025 21:53)  POCT Blood Glucose.: 309 mg/dL (18 Mar 2025 16:52)    I&O's Summary    18 Mar 2025 07:01  -  19 Mar 2025 07:00  --------------------------------------------------------  IN: 0 mL / OUT: 2000 mL / NET: -2000 mL        PHYSICAL EXAM  GENERAL: NAD, lying comfortably in bed, plesant mood  HEENT:  Atraumatic, Normocephalic, EOMI, conjunctiva and sclera clear, no LAD  CHEST/LUNG: Clear to auscultation bilaterally; No wheeze  HEART: RRR, S1 and S2 No murmurs, rubs, or gallops  ABDOMEN: Soft, Nontender, Nondistended; Bowel sounds present  EXTREMITIES:  2+ Peripheral Pulses, No clubbing, cyanosis, or edema  NEURO: AAOx2 (hospital and name, year), non-focal  SKIN: No rashes or lesions      LABS:                        9.3    6.04  )-----------( 264      ( 19 Mar 2025 07:12 )             29.0     03-19    139  |  104  |  14  ----------------------------<  201[H]  4.4   |  24  |  0.88    Ca    9.8      19 Mar 2025 07:12  Mg     1.5     03-19    TPro  5.6[L]  /  Alb  2.8[L]  /  TBili  0.2  /  DBili  x   /  AST  18  /  ALT  19  /  AlkPhos  82  03-19    PT/INR - ( 19 Mar 2025 07:12 )   PT: 10.8 sec;   INR: 0.94 ratio         PTT - ( 19 Mar 2025 07:12 )  PTT:36.7 sec      Urinalysis Basic - ( 19 Mar 2025 07:12 )    Color: x / Appearance: x / SG: x / pH: x  Gluc: 201 mg/dL / Ketone: x  / Bili: x / Urobili: x   Blood: x / Protein: x / Nitrite: x   Leuk Esterase: x / RBC: x / WBC x   Sq Epi: x / Non Sq Epi: x / Bacteria: x          RADIOLOGY & ADDITIONAL TESTS:  < from: MR Pelvis Bony Only No Cont (03.18.25 @ 18:36) >  IMPRESSION:    1.  Abnormal marrow signal throughout the sacrum and left iliac bone. The   appearance is nonspecific and may reflect neoplasm or infiltrative   process such as lymphoma, stress fractures, infection, and/or   osteonecrosis.  2.  Moderate presacral edema.  3.  Mild wall thickening of the distal sigmoid colon/rectum which may   reflect proctocolitis in the proper clinical setting. Similar to prior CT   given differences in technique.    --- End of Report ---    ***Please see the addendum at the top of this report. It may contain   additional important information or changes.****      < end of copied text >    *** ADDENDUM # 1 ***    Comparison is made to prior MRI the pelvis dated 12/02/2024. There has   been no significant interval change in the extent of marrow edema   centered on the sacrum and left iliac bone, though evaluation is limited   by extensive motion artifact on the MRI. Patchy signal in the right iliac   bone adjacent to the right sacroiliac joint is slightly more prominent   though limited by the motion artifact on prior exam.      Labs Personally Reviewed  Imaging Personally Reviewed  Consultant(s) Notes Reviewed

## 2025-03-19 NOTE — PROGRESS NOTE ADULT - PROBLEM SELECTOR PLAN 1
Inpatient Plan:  - Check BG TID AC, HS, and 2AM while on PO diet   - Adjust Lantus to 20u QHS  - Adjust Admelog to 16u TID AC (HOLD if NPO)  - C/w moderate dose Admelog correctional scale TID AC   - C/w low dose Admelog correctional scale HS and 2AM    Discharge Plan:  - Basal/bolus, doses TBD closer to d/c  - Patient should check BG TID AC and HS at home/rehab. Please tell patient/RNs to contact Endocrinologist or provider at rehab if BG <70mg/dL x1 or >200mg/dL consistently or >400mg/dL x1.   - Endocrine follow up: can establish care at [address] [place] [address] Endocrinology ([phone])- please provide in d/c paperwork for patient to make arely. Will add to appt list closer to d/c.   - Recommend routine outpatient ophthalmology and podiatry follow up.  - Please write Rxs for: Solo Star Insulin pen/Humalog Kwik insulin pen ( can prescribe any insulin analog equivalent covered by pt's insurance)/ Maria E insulin pen needles/glucose meter/strips/lancets. Glucose tablets/gel> use as directed plus Glucagon nasal/ IM injection (use as directed)>Can prescribe any covered by pt's insurance. Inpatient Plan:  - Check BG TID AC, HS, and 2AM while on PO diet   - Adjust Lantus to 20u QHS  - Adjust Admelog to 16u TID AC (HOLD if NPO)  - C/w moderate dose Admelog correctional scale TID AC   - C/w low dose Admelog correctional scale HS and 2AM    Discharge Plan:  - Basal/bolus, doses TBD closer to d/c  - Patient should check BG TID AC and HS at home/rehab. Please tell patient/RNs to contact Endocrinologist or provider at rehab if BG <70mg/dL x1 or >200mg/dL consistently or >400mg/dL x1.   - Endocrine follow up: can f/u with Nick Moore or can establish care at [address] [place] [address] ([phone])- please provide in d/c paperwork for patient to make appt once d/c from rehab.   - Recommend routine outpatient ophthalmology and podiatry follow up.  - Please write Rxs for: Solo Star Insulin pen/Humalog Kwik insulin pen ( can prescribe any insulin analog equivalent covered by pt's insurance)/ Maria E insulin pen needles/glucose meter/strips/lancets. Glucose tablets/gel> use as directed plus Glucagon nasal/ IM injection (use as directed)>Can prescribe any covered by pt's insurance.

## 2025-03-19 NOTE — PROGRESS NOTE ADULT - SUBJECTIVE AND OBJECTIVE BOX
Interval History/ROS:   Patient had no events overnight      REVIEW OF SYSTEMS  pending full examination      Allergies  No Known Allergies        ANTIMICROBIALS:    trimethoprim  160 mG/sulfamethoxazole 800 mG 1 daily  valACYclovir 500 every 12 hours  vancomycin  IVPB 1000 every 12 hours      OTHER MEDS: MEDICATIONS  (STANDING):  acetaminophen     Tablet .. 1000 every 6 hours PRN  aluminum hydroxide/magnesium hydroxide/simethicone Suspension 30 every 4 hours PRN  aspirin enteric coated 81 daily  buPROPion XL (24-Hour) . 300 daily  cloNIDine 0.1 three times a day  dextrose 50% Injectable 25 once  dextrose 50% Injectable 12.5 once  dextrose 50% Injectable 25 once  dextrose Oral Gel 15 once PRN  enoxaparin Injectable 40 every 24 hours  escitalopram 10 daily  glucagon  Injectable 1 once  hydrALAZINE 100 every 8 hours  insulin glargine Injectable (LANTUS) 20 at bedtime  insulin lispro (ADMELOG) corrective regimen sliding scale  three times a day before meals  insulin lispro (ADMELOG) corrective regimen sliding scale  at bedtime  insulin lispro Injectable (ADMELOG) 16 three times a day before meals  levothyroxine 88 daily  melatonin 3 at bedtime  OLANZapine Injectable 2.5 every 6 hours PRN  ondansetron Injectable 4 every 8 hours PRN  pantoprazole    Tablet 40 before breakfast  polyethylene glycol 3350 17 two times a day  predniSONE   Tablet 5 daily  QUEtiapine 25 at bedtime  rosuvastatin 10 at bedtime  senna 2 at bedtime  tacrolimus 1.5 two times a day      Vital Signs Last 24 Hrs  T(F): 97.9 (03-19-25 @ 12:41), Max: 100.7 (03-13-25 @ 15:25)    Vital Signs Last 24 Hrs  HR: 71 (03-19-25 @ 12:41) (70 - 71)  BP: 147/75 (03-19-25 @ 12:41) (147/75 - 152/77)  RR: 18 (03-19-25 @ 12:41)  SpO2: 97% (03-19-25 @ 12:41) (97% - 100%)  Wt(kg): --    EXAM:  pending full examination      Labs:                        9.3    6.04  )-----------( 264      ( 19 Mar 2025 07:12 )             29.0     03-19    139  |  104  |  14  ----------------------------<  201[H]  4.4   |  24  |  0.88    Ca    9.8      19 Mar 2025 07:12  Mg     1.5     03-19    TPro  5.6[L]  /  Alb  2.8[L]  /  TBili  0.2  /  DBili  x   /  AST  18  /  ALT  19  /  AlkPhos  82  03-19      WBC Trend:  WBC Count: 6.04 (03-19-25 @ 07:12)  WBC Count: 5.74 (03-18-25 @ 07:07)      Creatine Trend:  Creatinine: 0.88 (03-19)  Creatinine: 0.75 (03-18)  Creatinine: 0.86 (03-17)  Creatinine: 0.96 (03-14)      Liver Biochemical Testing Trend:  Alanine Aminotransferase (ALT/SGPT): 19 (03-19)  Alanine Aminotransferase (ALT/SGPT): 23 (03-18)  Alanine Aminotransferase (ALT/SGPT): 22 (03-13)  Alanine Aminotransferase (ALT/SGPT): 34 (02-27)  Alanine Aminotransferase (ALT/SGPT): 25 (02-04)  Aspartate Aminotransferase (AST/SGOT): 18 (03-19-25 @ 07:12)  Aspartate Aminotransferase (AST/SGOT): 16 (03-18-25 @ 07:08)  Aspartate Aminotransferase (AST/SGOT): 25 (03-13-25 @ 15:26)  Aspartate Aminotransferase (AST/SGOT): 29 (02-27-25 @ 11:49)  Aspartate Aminotransferase (AST/SGOT): 23 (02-04-25 @ 07:41)  Bilirubin Total: 0.2 (03-19)  Bilirubin Total: 0.2 (03-18)  Bilirubin Total: 0.6 (03-13)  Bilirubin Total: 0.3 (02-27)  Bilirubin Total: 0.4 (02-04)      Trend LDH  03-18-25 @ 07:08  153  02-27-25 @ 11:49  201  01-28-25 @ 07:34  517[H]  01-27-25 @ 07:00  519[H]  01-26-25 @ 17:42  505[H]      Urinalysis Basic - ( 19 Mar 2025 07:12 )    Color: x / Appearance: x / SG: x / pH: x  Gluc: 201 mg/dL / Ketone: x  / Bili: x / Urobili: x   Blood: x / Protein: x / Nitrite: x   Leuk Esterase: x / RBC: x / WBC x   Sq Epi: x / Non Sq Epi: x / Bacteria: x        MICROBIOLOGY:  Vancomycin Level, Random: 4.7 (03-14 @ 07:08)    MRSA PCR Result.: NotDetec (03-14-25 @ 16:11)  MRSA PCR Result.: NotDetec (03-13-25 @ 16:55)  MRSA PCR Result.: NotDetec (01-21-25 @ 17:23)      Culture - Blood (collected 16 Mar 2025 13:35)  Source: Blood Blood  Preliminary Report:    No growth at 48 Hours    Culture - Urine (collected 13 Mar 2025 16:08)  Source: Catheterized Catheterized  Final Report:    10,000 - 49,000 CFU/mL Candida albicans    "Susceptibilities not performed"    Culture - Blood (collected 13 Mar 2025 15:06)  Source: Blood Blood-Peripheral  Final Report:    Growth in aerobic and anaerobic bottles: Staphylococcus hominis    Growth in anaerobic bottle: Staphylococcus epidermidis    "Susceptibilities not performed"    Culture - Blood (collected 13 Mar 2025 15:01)  Source: Blood Blood-Peripheral  Final Report:    Growth in aerobic bottle: Staphylococcus haemolyticus    Growth in aerobic bottle: Staphylococcus hominis    Direct identification is available within approximately 3-5    hours either by Blood Panel Multiplexed PCR or Direct    MALDI-TOF. Details: [email or web address]  Organism: Blood Culture PCR  Staphylococcus haemolyticus  Staphylococcus hominis  Organism: Staphylococcus hominis    Sensitivities:      Method Type: CHRIST      -  Clindamycin: S <=0.25      -  Erythromycin: R >4      -  Gentamicin: S <=4 Should not be used as monotherapy      -  Oxacillin: R >2      -  Penicillin: R >2      -  Rifampin: S <=1 Should not be used as monotherapy      -  Tetracycline: S <=4      -  Trimethoprim/Sulfamethoxazole: R >2/38      -  Vancomycin: S 0.5  Organism: Staphylococcus haemolyticus    Sensitivities:      Method Type: CHRIST      -  Clindamycin: R >4      -  Erythromycin: R >4      -  Gentamicin: R >8 Should not be used as monotherapy      -  Oxacillin: R >2      -  Penicillin: R >2      -  Rifampin: R >2 Should not be used as monotherapy      -  Tetracycline: S <=4      -  Trimethoprim/Sulfamethoxazole: R >2/38      -  Vancomycin: S 2  Organism: Blood Culture PCR    Sensitivities:      Method Type: PCR      -  Coagulase negative Staphylococcus: Detec    Culture - Blood (collected 24 Jan 2025 00:46)  Source: .Blood BLOOD  Final Report:    No growth at 5 days    Culture - Blood (collected 24 Jan 2025 00:18)  Source: .Blood BLOOD  Final Report:    No growth at 5 days    Culture - Urine (collected 22 Jan 2025 05:05)  Source: Clean Catch Clean Catch (Midstream)  Final Report:    10,000 - 49,000 CFU/mL Candida albicans    "Susceptibilities not performed"    Culture - Blood (collected 21 Jan 2025 22:25)  Source: .Blood BLOOD  Final Report:    No growth at 5 days    Culture - Blood (collected 21 Jan 2025 22:07)  Source: .Blood BLOOD  Final Report:    No growth at 5 days    Culture - Blood (collected 19 Jan 2025 18:36)  Source: .Blood BLOOD  Final Report:    No growth at 5 days      HIV-1/2 Combo Result: Nonreact (12-27-24 @ 13:43)        Cytomegalovirus By PCR: NotDetec IU/mL (03-16-25 @ 13:43)              Fungitell: 392 pg/mL (03-16 @ 13:43)            Ferritin: 569 (03-14)      Lactate Dehydrogenase, Serum: 153 (03-18)            RADIOLOGY:  imaging below personally reviewed  < from: MR Pelvis Bony Only No Cont (03.18.25 @ 18:36) >  IMPRESSION:    1.  Abnormal marrow signal throughout the sacrum and left iliac bone. The   appearance is nonspecific and may reflect neoplasm or infiltrative   process such as lymphoma, stress fractures, infection, and/or   osteonecrosis.  2.  Moderate presacral edema.  3.  Mild wall thickening of the distal sigmoid colon/rectum which may   reflect proctocolitis in the proper clinical setting. Similar to prior CT   given differences in technique.    < end of copied text >  < from: MR Pelvis Bony Only No Cont (03.18.25 @ 18:36) >  *** ADDENDUM # 1 ***    Comparison is made to prior MRI the pelvis dated 12/02/2024. There has   been no significant interval change in the extent of marrow edema   centered on the sacrum and left iliac bone, though evaluation is limited   by extensive motion artifact on the MRI. Patchy signal in the right iliac   bone adjacent to the right sacroiliac joint is slightly more prominent   though limited by the motion artifact on prior exam.    < end of copied text >   Interval History/ROS:   Patient had no events overnight  Seems more agitated today  Refusing physical exam      REVIEW OF SYSTEMS  unable to obtain       Allergies  No Known Allergies        ANTIMICROBIALS:    trimethoprim  160 mG/sulfamethoxazole 800 mG 1 daily  valACYclovir 500 every 12 hours  vancomycin  IVPB 1000 every 12 hours      OTHER MEDS: MEDICATIONS  (STANDING):  acetaminophen     Tablet .. 1000 every 6 hours PRN  aluminum hydroxide/magnesium hydroxide/simethicone Suspension 30 every 4 hours PRN  aspirin enteric coated 81 daily  buPROPion XL (24-Hour) . 300 daily  cloNIDine 0.1 three times a day  dextrose 50% Injectable 25 once  dextrose 50% Injectable 12.5 once  dextrose 50% Injectable 25 once  dextrose Oral Gel 15 once PRN  enoxaparin Injectable 40 every 24 hours  escitalopram 10 daily  glucagon  Injectable 1 once  hydrALAZINE 100 every 8 hours  insulin glargine Injectable (LANTUS) 20 at bedtime  insulin lispro (ADMELOG) corrective regimen sliding scale  three times a day before meals  insulin lispro (ADMELOG) corrective regimen sliding scale  at bedtime  insulin lispro Injectable (ADMELOG) 16 three times a day before meals  levothyroxine 88 daily  melatonin 3 at bedtime  OLANZapine Injectable 2.5 every 6 hours PRN  ondansetron Injectable 4 every 8 hours PRN  pantoprazole    Tablet 40 before breakfast  polyethylene glycol 3350 17 two times a day  predniSONE   Tablet 5 daily  QUEtiapine 25 at bedtime  rosuvastatin 10 at bedtime  senna 2 at bedtime  tacrolimus 1.5 two times a day      Vital Signs Last 24 Hrs  T(F): 97.9 (03-19-25 @ 12:41), Max: 100.7 (03-13-25 @ 15:25)    Vital Signs Last 24 Hrs  HR: 71 (03-19-25 @ 12:41) (70 - 71)  BP: 147/75 (03-19-25 @ 12:41) (147/75 - 152/77)  RR: 18 (03-19-25 @ 12:41)  SpO2: 97% (03-19-25 @ 12:41) (97% - 100%)  Wt(kg): --    EXAM:  refused exam - unable to obtain      Labs:                        9.3    6.04  )-----------( 264      ( 19 Mar 2025 07:12 )             29.0     03-19    139  |  104  |  14  ----------------------------<  201[H]  4.4   |  24  |  0.88    Ca    9.8      19 Mar 2025 07:12  Mg     1.5     03-19    TPro  5.6[L]  /  Alb  2.8[L]  /  TBili  0.2  /  DBili  x   /  AST  18  /  ALT  19  /  AlkPhos  82  03-19      WBC Trend:  WBC Count: 6.04 (03-19-25 @ 07:12)  WBC Count: 5.74 (03-18-25 @ 07:07)      Creatine Trend:  Creatinine: 0.88 (03-19)  Creatinine: 0.75 (03-18)  Creatinine: 0.86 (03-17)  Creatinine: 0.96 (03-14)      Liver Biochemical Testing Trend:  Alanine Aminotransferase (ALT/SGPT): 19 (03-19)  Alanine Aminotransferase (ALT/SGPT): 23 (03-18)  Alanine Aminotransferase (ALT/SGPT): 22 (03-13)  Alanine Aminotransferase (ALT/SGPT): 34 (02-27)  Alanine Aminotransferase (ALT/SGPT): 25 (02-04)  Aspartate Aminotransferase (AST/SGOT): 18 (03-19-25 @ 07:12)  Aspartate Aminotransferase (AST/SGOT): 16 (03-18-25 @ 07:08)  Aspartate Aminotransferase (AST/SGOT): 25 (03-13-25 @ 15:26)  Aspartate Aminotransferase (AST/SGOT): 29 (02-27-25 @ 11:49)  Aspartate Aminotransferase (AST/SGOT): 23 (02-04-25 @ 07:41)  Bilirubin Total: 0.2 (03-19)  Bilirubin Total: 0.2 (03-18)  Bilirubin Total: 0.6 (03-13)  Bilirubin Total: 0.3 (02-27)  Bilirubin Total: 0.4 (02-04)      Trend LDH  03-18-25 @ 07:08  153  02-27-25 @ 11:49  201  01-28-25 @ 07:34  517[H]  01-27-25 @ 07:00  519[H]  01-26-25 @ 17:42  505[H]      Urinalysis Basic - ( 19 Mar 2025 07:12 )  Urinalysis + Microscopic Examination (03.13.25 @ 16:08)   pH Urine: 6.0  Urine Appearance: Cloudy  Color: Dark Yellow  Specific Gravity: 1.025  Protein, Urine: 30 mg/dL  Glucose Qualitative, Urine: 500 mg/dL  Ketone - Urine: Negative mg/dL  Blood, Urine: Negative  Bilirubin: Small  Urobilinogen: 1.0 mg/dL  Leukocyte Esterase Concentration: Moderate  Nitrite: Negative  White Blood Cell - Urine: 0-2 /HPF  Red Blood Cell - Urine: None Seen /HPF        MICROBIOLOGY:  Vancomycin Level, Random: 4.7 (03-14 @ 07:08)    MRSA PCR Result.: NotDetec (03-14-25 @ 16:11)  MRSA PCR Result.: NotDetec (03-13-25 @ 16:55)  MRSA PCR Result.: NotDetec (01-21-25 @ 17:23)      Culture - Blood (collected 16 Mar 2025 13:35)  Source: Blood Blood  Preliminary Report:    No growth at 48 Hours    Culture - Urine (collected 13 Mar 2025 16:08)  Source: Catheterized Catheterized  Final Report:    10,000 - 49,000 CFU/mL Candida albicans    "Susceptibilities not performed"    Culture - Blood (collected 13 Mar 2025 15:06)  Source: Blood Blood-Peripheral  Final Report:    Growth in aerobic and anaerobic bottles: Staphylococcus hominis    Growth in anaerobic bottle: Staphylococcus epidermidis    "Susceptibilities not performed"    Culture - Blood (collected 13 Mar 2025 15:01)  Source: Blood Blood-Peripheral  Final Report:    Growth in aerobic bottle: Staphylococcus haemolyticus    Growth in aerobic bottle: Staphylococcus hominis    Direct identification is available within approximately 3-5    hours either by Blood Panel Multiplexed PCR or Direct    MALDI-TOF. Details: [email or web address]  Organism: Blood Culture PCR  Staphylococcus haemolyticus  Staphylococcus hominis  Organism: Staphylococcus hominis    Sensitivities:      Method Type: CHRIST      -  Clindamycin: S <=0.25      -  Erythromycin: R >4      -  Gentamicin: S <=4 Should not be used as monotherapy      -  Oxacillin: R >2      -  Penicillin: R >2      -  Rifampin: S <=1 Should not be used as monotherapy      -  Tetracycline: S <=4      -  Trimethoprim/Sulfamethoxazole: R >2/38      -  Vancomycin: S 0.5  Organism: Staphylococcus haemolyticus    Sensitivities:      Method Type: CHRIST      -  Clindamycin: R >4      -  Erythromycin: R >4      -  Gentamicin: R >8 Should not be used as monotherapy      -  Oxacillin: R >2      -  Penicillin: R >2      -  Rifampin: R >2 Should not be used as monotherapy      -  Tetracycline: S <=4      -  Trimethoprim/Sulfamethoxazole: R >2/38      -  Vancomycin: S 2  Organism: Blood Culture PCR    Sensitivities:      Method Type: PCR      -  Coagulase negative Staphylococcus: Detec    Culture - Blood (collected 24 Jan 2025 00:46)  Source: .Blood BLOOD  Final Report:    No growth at 5 days    Culture - Blood (collected 24 Jan 2025 00:18)  Source: .Blood BLOOD  Final Report:    No growth at 5 days    Culture - Urine (collected 22 Jan 2025 05:05)  Source: Clean Catch Clean Catch (Midstream)  Final Report:    10,000 - 49,000 CFU/mL Candida albicans    "Susceptibilities not performed"    Culture - Blood (collected 21 Jan 2025 22:25)  Source: .Blood BLOOD  Final Report:    No growth at 5 days    Culture - Blood (collected 21 Jan 2025 22:07)  Source: .Blood BLOOD  Final Report:    No growth at 5 days    Culture - Blood (collected 19 Jan 2025 18:36)  Source: .Blood BLOOD  Final Report:    No growth at 5 days      HIV-1/2 Combo Result: Nonreact (12-27-24 @ 13:43)        Cytomegalovirus By PCR: NotDetec IU/mL (03-16-25 @ 13:43)              Fungitell: 392 pg/mL (03-16 @ 13:43)            Ferritin: 569 (03-14)      Lactate Dehydrogenase, Serum: 153 (03-18)            RADIOLOGY:  imaging below personally reviewed  < from: MR Pelvis Bony Only No Cont (03.18.25 @ 18:36) >  IMPRESSION:    1.  Abnormal marrow signal throughout the sacrum and left iliac bone. The   appearance is nonspecific and may reflect neoplasm or infiltrative   process such as lymphoma, stress fractures, infection, and/or   osteonecrosis.  2.  Moderate presacral edema.  3.  Mild wall thickening of the distal sigmoid colon/rectum which may   reflect proctocolitis in the proper clinical setting. Similar to prior CT   given differences in technique.    < end of copied text >  < from: MR Pelvis Bony Only No Cont (03.18.25 @ 18:36) >  *** ADDENDUM # 1 ***    Comparison is made to prior MRI the pelvis dated 12/02/2024. There has   been no significant interval change in the extent of marrow edema   centered on the sacrum and left iliac bone, though evaluation is limited   by extensive motion artifact on the MRI. Patchy signal in the right iliac   bone adjacent to the right sacroiliac joint is slightly more prominent   though limited by the motion artifact on prior exam.    < end of copied text >

## 2025-03-19 NOTE — PROGRESS NOTE ADULT - ATTENDING COMMENTS
.  Primary: [place]    Vital Signs Last 24 Hrs  T(C): 36.9 (19 Mar 2025 06:20), Max: 36.9 (19 Mar 2025 06:20)  T(F): 98.4 (19 Mar 2025 06:20), Max: 98.4 (19 Mar 2025 06:20)  HR: 70 (19 Mar 2025 06:20) (70 - 78)  BP: 150/82 (19 Mar 2025 06:20) (150/82 - 162/78)  BP(mean): --  RR: 18 (19 Mar 2025 06:20) (18 - 18)  SpO2: 98% (19 Mar 2025 06:20) (98% - 100%)    MEDICATIONS  (STANDING):  aspirin enteric coated 81 milliGRAM(s) Oral daily  buPROPion XL (24-Hour) . 300 milliGRAM(s) Oral daily  chlorhexidine 2% Cloths 1 Application(s) Topical <User Schedule>  cloNIDine 0.1 milliGRAM(s) Oral three times a day  dextrose 5%. 1000 milliLiter(s) (100 mL/Hr) IV Continuous <Continuous>  dextrose 5%. 1000 milliLiter(s) (50 mL/Hr) IV Continuous <Continuous>  dextrose 50% Injectable 25 Gram(s) IV Push once  dextrose 50% Injectable 12.5 Gram(s) IV Push once  dextrose 50% Injectable 25 Gram(s) IV Push once  enoxaparin Injectable 40 milliGRAM(s) SubCutaneous every 24 hours  escitalopram 10 milliGRAM(s) Oral daily  glucagon  Injectable 1 milliGRAM(s) IntraMuscular once  hydrALAZINE 100 milliGRAM(s) Oral every 8 hours  insulin glargine Injectable (LANTUS) 18 Unit(s) SubCutaneous at bedtime  insulin lispro (ADMELOG) corrective regimen sliding scale   SubCutaneous three times a day before meals  insulin lispro (ADMELOG) corrective regimen sliding scale   SubCutaneous at bedtime  insulin lispro Injectable (ADMELOG) 10 Unit(s) SubCutaneous three times a day before meals  lactated ringers. 1000 milliLiter(s) (75 mL/Hr) IV Continuous <Continuous>  levothyroxine 88 MICROGram(s) Oral daily  melatonin 3 milliGRAM(s) Oral at bedtime  pantoprazole    Tablet 40 milliGRAM(s) Oral before breakfast  polyethylene glycol 3350 17 Gram(s) Oral two times a day  predniSONE   Tablet 5 milliGRAM(s) Oral daily  QUEtiapine 25 milliGRAM(s) Oral at bedtime  rosuvastatin 10 milliGRAM(s) Oral at bedtime  senna 2 Tablet(s) Oral at bedtime  tacrolimus 1.5 milliGRAM(s) Oral two times a day  trimethoprim  160 mG/sulfamethoxazole 800 mG 1 Tablet(s) Oral daily  valACYclovir 500 milliGRAM(s) Oral every 12 hours  vancomycin  IVPB 1000 milliGRAM(s) IV Intermittent every 12 hours    Assessment: 69-year-old day 20 cycle 2 Rituximab/polatuzumab for post renal transplant, advanced stage PTLD.   Course complicated by severe PCP infection on day 1 of cycle 2 miniRCHOP (and cycle 2 was not given); currently complicated by bacteremia (3/13/25) Staph hominis+staph haemolyticus and Gi PCr + for Campylobacter.     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    Discussion: While Piero's recurrent infections are life-threatening, the goal of his current care is that of his PTLD - also a fatal disease untreated.  We have adopted for Piero a novel, chemotherapy free approach (started on 2/13/25) and compared to previous CT scans (Jan 23, 2025) he has had a remarkable response.     Please transfer to 8 Kd: Malignant Hematology is willing to attend.     Plan:  Heme:   Continue sunil/rev/rituxmab was to be infused tomorrow, will delay for transfer to oncology unit and for Revlimid   ASA secondary to Revlimid   Scot has an elevated ferritin: continue iron sucrose IVPB 200 milliGRAM(s) IV Intermittent every 24 hours; the low iron saturation is from Hepcidin    Radiology: MRI [facility] (3/18/25): pending, CT chest (3/17/25): no adenopathy, CT Abd (3/13/25): near resolution of hepatic (lymphoma) lesion    ID: valtrex/vanco/bactrim, ID has requested serum markers: fungitell and LDH (please recall LDH is also elevated in DLBCL)  Patient has received lympho depleting lymphoma treatment: please check IgG level.     Renal transplant: pred and tacro    DVT prophylaxis: please change to LWHx.    Over 60 minutes were spent in direct patient, non-resident teaching, care and care coordination today. .  Primary: [place]    Vital Signs Last 24 Hrs  T(C): 36.9 (19 Mar 2025 06:20), Max: 36.9 (19 Mar 2025 06:20)  T(F): 98.4 (19 Mar 2025 06:20), Max: 98.4 (19 Mar 2025 06:20)  HR: 70 (19 Mar 2025 06:20) (70 - 78)  BP: 150/82 (19 Mar 2025 06:20) (150/82 - 162/78)  BP(mean): --  RR: 18 (19 Mar 2025 06:20) (18 - 18)  SpO2: 98% (19 Mar 2025 06:20) (98% - 100%)    MEDICATIONS  (STANDING):  aspirin enteric coated 81 milliGRAM(s) Oral daily  buPROPion XL (24-Hour) . 300 milliGRAM(s) Oral daily  chlorhexidine 2% Cloths 1 Application(s) Topical <User Schedule>  cloNIDine 0.1 milliGRAM(s) Oral three times a day  dextrose 5%. 1000 milliLiter(s) (100 mL/Hr) IV Continuous <Continuous>  dextrose 5%. 1000 milliLiter(s) (50 mL/Hr) IV Continuous <Continuous>  dextrose 50% Injectable 25 Gram(s) IV Push once  dextrose 50% Injectable 12.5 Gram(s) IV Push once  dextrose 50% Injectable 25 Gram(s) IV Push once  enoxaparin Injectable 40 milliGRAM(s) SubCutaneous every 24 hours  escitalopram 10 milliGRAM(s) Oral daily  glucagon  Injectable 1 milliGRAM(s) IntraMuscular once  hydrALAZINE 100 milliGRAM(s) Oral every 8 hours  insulin glargine Injectable (LANTUS) 18 Unit(s) SubCutaneous at bedtime  insulin lispro (ADMELOG) corrective regimen sliding scale   SubCutaneous three times a day before meals  insulin lispro (ADMELOG) corrective regimen sliding scale   SubCutaneous at bedtime  insulin lispro Injectable (ADMELOG) 10 Unit(s) SubCutaneous three times a day before meals  lactated ringers. 1000 milliLiter(s) (75 mL/Hr) IV Continuous <Continuous>  levothyroxine 88 MICROGram(s) Oral daily  melatonin 3 milliGRAM(s) Oral at bedtime  pantoprazole    Tablet 40 milliGRAM(s) Oral before breakfast  polyethylene glycol 3350 17 Gram(s) Oral two times a day  predniSONE   Tablet 5 milliGRAM(s) Oral daily  QUEtiapine 25 milliGRAM(s) Oral at bedtime  rosuvastatin 10 milliGRAM(s) Oral at bedtime  senna 2 Tablet(s) Oral at bedtime  tacrolimus 1.5 milliGRAM(s) Oral two times a day  trimethoprim  160 mG/sulfamethoxazole 800 mG 1 Tablet(s) Oral daily  valACYclovir 500 milliGRAM(s) Oral every 12 hours  vancomycin  IVPB 1000 milliGRAM(s) IV Intermittent every 12 hours    Assessment: 69-year-old day 21 cycle 2 Rituximab/polatuzumab for post renal transplant, advanced stage PTLD.   Course complicated by severe PCP infection on day 1 of cycle 2 miniRCHOP (and cycle 2 was not given); currently complicated by bacteremia (3/13/25) Staph hominis+staph haemolyticus and Gi PCr + for Campylobacter.     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    Discussion: While Piero's recurrent infections are life-threatening, the goal of his current care is that of his PTLD - also a fatal disease untreated.  We have adopted for Piero a novel, chemotherapy free approach (started on 2/13/25) and compared to previous CT scans (Jan 23, 2025) he has had a remarkable response.     Please transfer to 8 Kd: Malignant Hematology is willing to attend.     Plan:  Heme:   Continue sunil/rev/rituxmab was to be infused tomorrow, will delay for transfer to oncology unit and for Revlimid   ASA secondary to Revlimid   Scot has an elevated ferritin: continue iron sucrose IVPB 200 milliGRAM(s) IV Intermittent every 24 hours; the low iron saturation is from Hepcidin    Radiology: MRI [facility] (3/18/25): pending, CT chest (3/17/25): no adenopathy, CT Abd (3/13/25): near resolution of hepatic (lymphoma) lesion    ID: valtrex/vanco/bactrim, ID has requested serum markers: fungitell and LDH (please recall LDH is also elevated in DLBCL)  Patient has received lympho depleting lymphoma treatment: please check IgG level.     Renal transplant: pred and tacro    DVT prophylaxis: please change to LWHx.    Over 60 minutes were spent in direct patient, non-resident teaching, care and care coordination today.

## 2025-03-19 NOTE — PROGRESS NOTE ADULT - PROBLEM SELECTOR PLAN 6
Plan:  -c/w tacro 1.5 mg BID and f/u tacro level before AM dose  - holding mycophenalate  - consulted transplant nephrology   -c/w valacyclovir 500 mg q12 for ppx  -c/w bactrim DS ppx    #Hx of PJP Pna  -CT chest- improved opacities   -fungitell 392 from >500 in Jan 2025

## 2025-03-19 NOTE — PROGRESS NOTE ADULT - ASSESSMENT
69 year old M with a history of renal transplant (2012) on tacrolimus, s/p left nephrectomy, DLBCL on active chemo, peripheral neuropathy, hypothyroidism, retroperitoneal fibrosis encasing veins, HTN, HLD, GERD, anxiety/depression, ANGELIKA and recent admission at [facility] (1/17 - 2/6/25) for sepsis 2/2 Klebsiella pneumoniae ESBL UTI and AHRF c/f PJP pneumonia (s/p completion of atovaquone and pred course) pw unwitnessed fall, in setting of sepsis likely 2/2 UTI s/p 5days ertapneum with staph bacteremia (?contaminant), Camplobacter s/p abx and toxic metabolic encephalopathy

## 2025-03-19 NOTE — PROGRESS NOTE ADULT - PROBLEM SELECTOR PLAN 1
Staph hominis both bottles 3/13. Staph epi, staph hemolyticus  -repeat blood cx (contamination? on first draw) 3/16 ngtd  Transplant ID eval appreciated --> swtiched from dapto to vancomycin 3/18  TTE --> was unable to r/o endocarditis   Plevic MRI- marrow signal in sacrum and left iliac bone similar to 12/2024. per ID, less likely infection, likely from underlying malingnacy  -no clear source of infection, no wounds on sacrum.

## 2025-03-19 NOTE — PROGRESS NOTE ADULT - SUBJECTIVE AND OBJECTIVE BOX
Diagnosis    Protocol/Chemo Regimen: Chao-rituximab-revlimid  Day: C2D21     Pt endorsed: Feels well        Allergies    No Known Allergies    Intolerances        ANTIMICROBIALS  trimethoprim  160 mG/sulfamethoxazole 800 mG 1 Tablet(s) Oral daily  valACYclovir 500 milliGRAM(s) Oral every 12 hours  vancomycin  IVPB 1000 milliGRAM(s) IV Intermittent every 12 hours      HEME/ONC MEDICATIONS  aspirin enteric coated 81 milliGRAM(s) Oral daily  enoxaparin Injectable 40 milliGRAM(s) SubCutaneous every 24 hours      STANDING MEDICATIONS  buPROPion XL (24-Hour) . 300 milliGRAM(s) Oral daily  chlorhexidine 2% Cloths 1 Application(s) Topical <User Schedule>  cloNIDine 0.1 milliGRAM(s) Oral three times a day  dextrose 5%. 1000 milliLiter(s) IV Continuous <Continuous>  dextrose 5%. 1000 milliLiter(s) IV Continuous <Continuous>  dextrose 50% Injectable 25 Gram(s) IV Push once  dextrose 50% Injectable 12.5 Gram(s) IV Push once  dextrose 50% Injectable 25 Gram(s) IV Push once  escitalopram 10 milliGRAM(s) Oral daily  glucagon  Injectable 1 milliGRAM(s) IntraMuscular once  hydrALAZINE 100 milliGRAM(s) Oral every 8 hours  insulin glargine Injectable (LANTUS) 20 Unit(s) SubCutaneous at bedtime  insulin lispro (ADMELOG) corrective regimen sliding scale   SubCutaneous three times a day before meals  insulin lispro (ADMELOG) corrective regimen sliding scale   SubCutaneous at bedtime  insulin lispro Injectable (ADMELOG) 15 Unit(s) SubCutaneous three times a day before meals  lactated ringers. 1000 milliLiter(s) IV Continuous <Continuous>  levothyroxine 88 MICROGram(s) Oral daily  melatonin 3 milliGRAM(s) Oral at bedtime  pantoprazole    Tablet 40 milliGRAM(s) Oral before breakfast  polyethylene glycol 3350 17 Gram(s) Oral two times a day  predniSONE   Tablet 5 milliGRAM(s) Oral daily  QUEtiapine 25 milliGRAM(s) Oral at bedtime  rosuvastatin 10 milliGRAM(s) Oral at bedtime  senna 2 Tablet(s) Oral at bedtime  tacrolimus 1.5 milliGRAM(s) Oral two times a day      PRN MEDICATIONS  acetaminophen     Tablet .. 1000 milliGRAM(s) Oral every 6 hours PRN  aluminum hydroxide/magnesium hydroxide/simethicone Suspension 30 milliLiter(s) Oral every 4 hours PRN  dextrose Oral Gel 15 Gram(s) Oral once PRN  OLANZapine Injectable 2.5 milliGRAM(s) IntraMuscular every 6 hours PRN  ondansetron Injectable 4 milliGRAM(s) IV Push every 8 hours PRN        Vital Signs Last 24 Hrs  T(C): 36.9 (19 Mar 2025 06:20), Max: 36.9 (19 Mar 2025 06:20)  T(F): 98.4 (19 Mar 2025 06:20), Max: 98.4 (19 Mar 2025 06:20)  HR: 70 (19 Mar 2025 06:20) (70 - 78)  BP: 150/82 (19 Mar 2025 06:20) (150/82 - 162/78)  BP(mean): --  RR: 18 (19 Mar 2025 06:20) (18 - 18)  SpO2: 98% (19 Mar 2025 06:20) (98% - 100%)    Parameters below as of 19 Mar 2025 06:20  Patient On (Oxygen Delivery Method): room air        PHYSICAL EXAM  General: adult in NAD  HEENT: clear oropharynx, anicteric sclera, pink conjunctiva  Neck: supple  CV: normal S1/S2 RRR  Lungs: positive air movement b/l ant lungs,clear to auscultation, no wheezes, no rales  Abdomen: soft non-tender moderately-distended, (+) BS  Ext: no edema  Skin: no rashes and no petechiae  Neuro: alert and oriented X 2-3, no focal deficits    LABS:    Blood Cultures:                           9.3    6.04  )-----------( 264      ( 19 Mar 2025 07:12 )             29.0         Mean Cell Volume : 99.0 fl  Mean Cell Hemoglobin : 31.7 pg  Mean Cell Hemoglobin Concentration : 32.1 g/dL  Auto Neutrophil # : x  Auto Lymphocyte # : x  Auto Monocyte # : x  Auto Eosinophil # : x  Auto Basophil # : x  Auto Neutrophil % : x  Auto Lymphocyte % : x  Auto Monocyte % : x  Auto Eosinophil % : x  Auto Basophil % : x      03-19    139  |  104  |  14  ----------------------------<  201[H]  4.4   |  24  |  0.88    Ca    9.8      19 Mar 2025 07:12  Mg     1.5     03-19    TPro  5.6[L]  /  Alb  2.8[L]  /  TBili  0.2  /  DBili  x   /  AST  18  /  ALT  19  /  AlkPhos  82  03-19      Mg 1.5  Phos --      PT/INR - ( 19 Mar 2025 07:12 )   PT: 10.8 sec;   INR: 0.94 ratio         PTT - ( 19 Mar 2025 07:12 )  PTT:36.7 sec        RADIOLOGY & ADDITIONAL STUDIES:

## 2025-03-20 ENCOUNTER — APPOINTMENT (OUTPATIENT)
Dept: INFUSION THERAPY | Facility: HOSPITAL | Age: 70
End: 2025-03-20

## 2025-03-20 ENCOUNTER — APPOINTMENT (OUTPATIENT)
Dept: HEMATOLOGY ONCOLOGY | Facility: CLINIC | Age: 70
End: 2025-03-20

## 2025-03-20 DIAGNOSIS — B99.9 UNSPECIFIED INFECTIOUS DISEASE: ICD-10-CM

## 2025-03-20 LAB
ANION GAP SERPL CALC-SCNC: 14 MMOL/L — SIGNIFICANT CHANGE UP (ref 5–17)
BASOPHILS # BLD AUTO: 0.12 K/UL — SIGNIFICANT CHANGE UP (ref 0–0.2)
BASOPHILS NFR BLD AUTO: 2.1 % — HIGH (ref 0–2)
BUN SERPL-MCNC: 15 MG/DL — SIGNIFICANT CHANGE UP (ref 7–23)
CALCIUM SERPL-MCNC: 9.8 MG/DL — SIGNIFICANT CHANGE UP (ref 8.4–10.5)
CHLORIDE SERPL-SCNC: 100 MMOL/L — SIGNIFICANT CHANGE UP (ref 96–108)
CO2 SERPL-SCNC: 22 MMOL/L — SIGNIFICANT CHANGE UP (ref 22–31)
CREAT SERPL-MCNC: 0.78 MG/DL — SIGNIFICANT CHANGE UP (ref 0.5–1.3)
EGFR: 98 ML/MIN/1.73M2 — SIGNIFICANT CHANGE UP
EOSINOPHIL # BLD AUTO: 0.15 K/UL — SIGNIFICANT CHANGE UP (ref 0–0.5)
FIBRINOGEN PPP-MCNC: 452 MG/DL — HIGH (ref 200–445)
GLUCOSE BLDC GLUCOMTR-MCNC: 239 MG/DL — HIGH (ref 70–99)
GLUCOSE BLDC GLUCOMTR-MCNC: 244 MG/DL — HIGH (ref 70–99)
GLUCOSE BLDC GLUCOMTR-MCNC: 273 MG/DL — HIGH (ref 70–99)
GLUCOSE BLDC GLUCOMTR-MCNC: 324 MG/DL — HIGH (ref 70–99)
GLUCOSE SERPL-MCNC: 260 MG/DL — HIGH (ref 70–99)
HCT VFR BLD CALC: 29.4 % — LOW (ref 39–50)
HGB BLD-MCNC: 9.3 G/DL — LOW (ref 13–17)
IMM GRANULOCYTES NFR BLD AUTO: 3.6 % — HIGH (ref 0–0.9)
LYMPHOCYTES # BLD AUTO: 0.8 K/UL — LOW (ref 1–3.3)
LYMPHOCYTES # BLD AUTO: 13.7 % — SIGNIFICANT CHANGE UP (ref 13–44)
MAGNESIUM SERPL-MCNC: 1.7 MG/DL — SIGNIFICANT CHANGE UP (ref 1.6–2.6)
MCHC RBC-ENTMCNC: 31.5 PG — SIGNIFICANT CHANGE UP (ref 27–34)
MCHC RBC-ENTMCNC: 31.6 G/DL — LOW (ref 32–36)
MCV RBC AUTO: 99.7 FL — SIGNIFICANT CHANGE UP (ref 80–100)
MONOCYTES # BLD AUTO: 0.58 K/UL — SIGNIFICANT CHANGE UP (ref 0–0.9)
MONOCYTES NFR BLD AUTO: 9.9 % — SIGNIFICANT CHANGE UP (ref 2–14)
NEUTROPHILS # BLD AUTO: 3.97 K/UL — SIGNIFICANT CHANGE UP (ref 1.8–7.4)
NEUTROPHILS NFR BLD AUTO: 68.1 % — SIGNIFICANT CHANGE UP (ref 43–77)
NRBC BLD AUTO-RTO: 0 /100 WBCS — SIGNIFICANT CHANGE UP (ref 0–0)
PHOSPHATE SERPL-MCNC: 2.6 MG/DL — SIGNIFICANT CHANGE UP (ref 2.5–4.5)
PLATELET # BLD AUTO: 265 K/UL — SIGNIFICANT CHANGE UP (ref 150–400)
POTASSIUM SERPL-MCNC: 4.7 MMOL/L — SIGNIFICANT CHANGE UP (ref 3.5–5.3)
POTASSIUM SERPL-SCNC: 4.7 MMOL/L — SIGNIFICANT CHANGE UP (ref 3.5–5.3)
RBC # BLD: 2.95 M/UL — LOW (ref 4.2–5.8)
RBC # FLD: 16.5 % — HIGH (ref 10.3–14.5)
SODIUM SERPL-SCNC: 136 MMOL/L — SIGNIFICANT CHANGE UP (ref 135–145)
URATE SERPL-MCNC: 5.6 MG/DL — SIGNIFICANT CHANGE UP (ref 3.4–8.8)
VANCOMYCIN TROUGH SERPL-MCNC: 13.4 UG/ML — SIGNIFICANT CHANGE UP (ref 10–20)
WBC # BLD: 5.83 K/UL — SIGNIFICANT CHANGE UP (ref 3.8–10.5)
WBC # FLD AUTO: 5.83 K/UL — SIGNIFICANT CHANGE UP (ref 3.8–10.5)

## 2025-03-20 PROCEDURE — 99232 SBSQ HOSP IP/OBS MODERATE 35: CPT

## 2025-03-20 PROCEDURE — G0545: CPT

## 2025-03-20 PROCEDURE — 99233 SBSQ HOSP IP/OBS HIGH 50: CPT | Mod: FS

## 2025-03-20 RX ORDER — RITUXIMAB-PVVR 100 MG/10ML
731 INJECTION, SOLUTION INTRAVENOUS ONCE
Refills: 0 | Status: COMPLETED | OUTPATIENT
Start: 2025-03-20 | End: 2025-03-20

## 2025-03-20 RX ORDER — ACETAMINOPHEN 500 MG/5ML
650 LIQUID (ML) ORAL ONCE
Refills: 0 | Status: COMPLETED | OUTPATIENT
Start: 2025-03-20 | End: 2025-03-20

## 2025-03-20 RX ORDER — INSULIN LISPRO 100 U/ML
20 INJECTION, SOLUTION INTRAVENOUS; SUBCUTANEOUS
Refills: 0 | Status: DISCONTINUED | OUTPATIENT
Start: 2025-03-20 | End: 2025-03-21

## 2025-03-20 RX ORDER — DIPHENHYDRAMINE HCL 12.5MG/5ML
25 ELIXIR ORAL ONCE
Refills: 0 | Status: COMPLETED | OUTPATIENT
Start: 2025-03-20 | End: 2025-03-20

## 2025-03-20 RX ORDER — IMMUNE GLOBULIN (HUMAN) 10 G/100ML
30 INJECTION INTRAVENOUS; SUBCUTANEOUS ONCE
Refills: 0 | Status: COMPLETED | OUTPATIENT
Start: 2025-03-20 | End: 2025-03-20

## 2025-03-20 RX ORDER — LENALIDOMIDE 10 MG/1
10 CAPSULE ORAL AT BEDTIME
Refills: 0 | Status: DISCONTINUED | OUTPATIENT
Start: 2025-03-20 | End: 2025-03-21

## 2025-03-20 RX ORDER — DIPHENHYDRAMINE HCL 12.5MG/5ML
50 ELIXIR ORAL ONCE
Refills: 0 | Status: COMPLETED | OUTPATIENT
Start: 2025-03-20 | End: 2025-03-20

## 2025-03-20 RX ORDER — POLATUZUMAB VEDOTIN 140 MG/7.52ML
134 INJECTION, POWDER, LYOPHILIZED, FOR SOLUTION INTRAVENOUS ONCE
Refills: 0 | Status: COMPLETED | OUTPATIENT
Start: 2025-03-20 | End: 2025-03-20

## 2025-03-20 RX ORDER — INSULIN GLARGINE-YFGN 100 [IU]/ML
24 INJECTION, SOLUTION SUBCUTANEOUS AT BEDTIME
Refills: 0 | Status: DISCONTINUED | OUTPATIENT
Start: 2025-03-20 | End: 2025-03-21

## 2025-03-20 RX ADMIN — IMMUNE GLOBULIN (HUMAN) 50 GRAM(S): 10 INJECTION INTRAVENOUS; SUBCUTANEOUS at 18:15

## 2025-03-20 RX ADMIN — RITUXIMAB-PVVR 731 MILLIGRAM(S): 100 INJECTION, SOLUTION INTRAVENOUS at 13:51

## 2025-03-20 RX ADMIN — Medication 100 MILLIGRAM(S): at 05:43

## 2025-03-20 RX ADMIN — Medication 1 TABLET(S): at 11:10

## 2025-03-20 RX ADMIN — Medication 100 MILLIGRAM(S): at 13:05

## 2025-03-20 RX ADMIN — Medication 25 MILLIGRAM(S): at 17:43

## 2025-03-20 RX ADMIN — Medication 0.1 MILLIGRAM(S): at 21:33

## 2025-03-20 RX ADMIN — INSULIN LISPRO 8: 100 INJECTION, SOLUTION INTRAVENOUS; SUBCUTANEOUS at 08:25

## 2025-03-20 RX ADMIN — POLYETHYLENE GLYCOL 3350 17 GRAM(S): 17 POWDER, FOR SOLUTION ORAL at 17:01

## 2025-03-20 RX ADMIN — POLYETHYLENE GLYCOL 3350 17 GRAM(S): 17 POWDER, FOR SOLUTION ORAL at 05:42

## 2025-03-20 RX ADMIN — ENOXAPARIN SODIUM 40 MILLIGRAM(S): 100 INJECTION SUBCUTANEOUS at 11:11

## 2025-03-20 RX ADMIN — Medication 250 MILLIGRAM(S): at 17:00

## 2025-03-20 RX ADMIN — Medication 500 MILLIGRAM(S): at 17:00

## 2025-03-20 RX ADMIN — Medication 20 MILLIGRAM(S): at 13:00

## 2025-03-20 RX ADMIN — ESCITALOPRAM OXALATE 10 MILLIGRAM(S): 20 TABLET ORAL at 11:10

## 2025-03-20 RX ADMIN — Medication 88 MICROGRAM(S): at 05:42

## 2025-03-20 RX ADMIN — QUETIAPINE FUMARATE 25 MILLIGRAM(S): 25 TABLET ORAL at 21:33

## 2025-03-20 RX ADMIN — Medication 50 MILLIGRAM(S): at 13:00

## 2025-03-20 RX ADMIN — INSULIN LISPRO 20 UNIT(S): 100 INJECTION, SOLUTION INTRAVENOUS; SUBCUTANEOUS at 16:39

## 2025-03-20 RX ADMIN — Medication 3 MILLIGRAM(S): at 21:33

## 2025-03-20 RX ADMIN — ROSUVASTATIN CALCIUM 10 MILLIGRAM(S): 20 TABLET, FILM COATED ORAL at 21:33

## 2025-03-20 RX ADMIN — Medication 1 APPLICATION(S): at 06:22

## 2025-03-20 RX ADMIN — BUPROPION HYDROBROMIDE 300 MILLIGRAM(S): 522 TABLET, EXTENDED RELEASE ORAL at 11:10

## 2025-03-20 RX ADMIN — Medication 100 MILLIGRAM(S): at 21:33

## 2025-03-20 RX ADMIN — Medication 650 MILLIGRAM(S): at 17:43

## 2025-03-20 RX ADMIN — POLATUZUMAB VEDOTIN 134 MILLIGRAM(S): 140 INJECTION, POWDER, LYOPHILIZED, FOR SOLUTION INTRAVENOUS at 15:45

## 2025-03-20 RX ADMIN — Medication 650 MILLIGRAM(S): at 13:00

## 2025-03-20 RX ADMIN — Medication 250 MILLIGRAM(S): at 05:42

## 2025-03-20 RX ADMIN — TACROLIMUS 1.5 MILLIGRAM(S): 0.5 CAPSULE ORAL at 21:32

## 2025-03-20 RX ADMIN — Medication 0.1 MILLIGRAM(S): at 13:05

## 2025-03-20 RX ADMIN — INSULIN LISPRO 6: 100 INJECTION, SOLUTION INTRAVENOUS; SUBCUTANEOUS at 16:39

## 2025-03-20 RX ADMIN — TACROLIMUS 1.5 MILLIGRAM(S): 0.5 CAPSULE ORAL at 08:26

## 2025-03-20 RX ADMIN — Medication 81 MILLIGRAM(S): at 11:10

## 2025-03-20 RX ADMIN — Medication 2 TABLET(S): at 21:33

## 2025-03-20 RX ADMIN — PREDNISONE 5 MILLIGRAM(S): 20 TABLET ORAL at 05:42

## 2025-03-20 RX ADMIN — Medication 0.1 MILLIGRAM(S): at 05:42

## 2025-03-20 RX ADMIN — Medication 500 MILLIGRAM(S): at 05:42

## 2025-03-20 RX ADMIN — INSULIN LISPRO 16 UNIT(S): 100 INJECTION, SOLUTION INTRAVENOUS; SUBCUTANEOUS at 08:25

## 2025-03-20 RX ADMIN — LENALIDOMIDE 10 MILLIGRAM(S): 10 CAPSULE ORAL at 21:49

## 2025-03-20 RX ADMIN — INSULIN GLARGINE-YFGN 24 UNIT(S): 100 INJECTION, SOLUTION SUBCUTANEOUS at 21:32

## 2025-03-20 RX ADMIN — Medication 40 MILLIGRAM(S): at 05:43

## 2025-03-20 NOTE — PROGRESS NOTE ADULT - SUBJECTIVE AND OBJECTIVE BOX
Diagnosis DLBCL    Protocol/Chemo Regimen: sunil-rituximab-revlimid  Day: C3D1       Allergies    No Known Allergies    Intolerances        ANTIMICROBIALS  trimethoprim  160 mG/sulfamethoxazole 800 mG 1 Tablet(s) Oral daily  valACYclovir 500 milliGRAM(s) Oral every 12 hours  vancomycin  IVPB 1000 milliGRAM(s) IV Intermittent every 12 hours      HEME/ONC MEDICATIONS  aspirin enteric coated 81 milliGRAM(s) Oral daily  enoxaparin Injectable 40 milliGRAM(s) SubCutaneous every 24 hours      STANDING MEDICATIONS  acetaminophen     Tablet .. 650 milliGRAM(s) Oral once  buPROPion XL (24-Hour) . 300 milliGRAM(s) Oral daily  chlorhexidine 2% Cloths 1 Application(s) Topical <User Schedule>  cloNIDine 0.1 milliGRAM(s) Oral three times a day  dextrose 5%. 1000 milliLiter(s) IV Continuous <Continuous>  dextrose 5%. 1000 milliLiter(s) IV Continuous <Continuous>  dextrose 50% Injectable 25 Gram(s) IV Push once  dextrose 50% Injectable 12.5 Gram(s) IV Push once  dextrose 50% Injectable 25 Gram(s) IV Push once  diphenhydrAMINE 25 milliGRAM(s) Oral once  escitalopram 10 milliGRAM(s) Oral daily  glucagon  Injectable 1 milliGRAM(s) IntraMuscular once  hydrALAZINE 100 milliGRAM(s) Oral every 8 hours  immune   globulin 10% (GAMMAGARD) IVPB 30 Gram(s) IV Intermittent once  insulin glargine Injectable (LANTUS) 20 Unit(s) SubCutaneous at bedtime  insulin lispro (ADMELOG) corrective regimen sliding scale   SubCutaneous three times a day before meals  insulin lispro (ADMELOG) corrective regimen sliding scale   SubCutaneous at bedtime  insulin lispro Injectable (ADMELOG) 16 Unit(s) SubCutaneous three times a day before meals  lactated ringers. 1000 milliLiter(s) IV Continuous <Continuous>  levothyroxine 88 MICROGram(s) Oral daily  melatonin 3 milliGRAM(s) Oral at bedtime  pantoprazole    Tablet 40 milliGRAM(s) Oral before breakfast  polyethylene glycol 3350 17 Gram(s) Oral two times a day  predniSONE   Tablet 5 milliGRAM(s) Oral daily  QUEtiapine 25 milliGRAM(s) Oral at bedtime  rosuvastatin 10 milliGRAM(s) Oral at bedtime  senna 2 Tablet(s) Oral at bedtime  tacrolimus 1.5 milliGRAM(s) Oral two times a day      PRN MEDICATIONS  acetaminophen     Tablet .. 1000 milliGRAM(s) Oral every 6 hours PRN  aluminum hydroxide/magnesium hydroxide/simethicone Suspension 30 milliLiter(s) Oral every 4 hours PRN  dextrose Oral Gel 15 Gram(s) Oral once PRN  OLANZapine Injectable 2.5 milliGRAM(s) IntraMuscular every 6 hours PRN  ondansetron Injectable 4 milliGRAM(s) IV Push every 8 hours PRN        Vital Signs Last 24 Hrs  T(C): 36.7 (20 Mar 2025 08:43), Max: 36.8 (19 Mar 2025 17:01)  T(F): 98 (20 Mar 2025 08:43), Max: 98.2 (19 Mar 2025 17:01)  HR: 66 (20 Mar 2025 08:43) (66 - 71)  BP: 148/63 (20 Mar 2025 08:43) (147/69 - 158/76)  BP(mean): --  RR: 18 (20 Mar 2025 08:43) (18 - 18)  SpO2: 100% (20 Mar 2025 08:43) (97% - 100%)    Parameters below as of 20 Mar 2025 08:43  Patient On (Oxygen Delivery Method): room air        PHYSICAL EXAM  General: adult in NAD  HEENT: clear oropharynx, anicteric sclera, pink conjunctiva  Neck: supple  CV: normal S1/S2 RRR  Lungs: positive air movement b/l ant lungs,clear to auscultation, no wheezes, no rales  Abdomen: soft non-tender non-distended, (+) BS  Ext: no edema  Skin: no rashes and no petechiae  Neuro: alert and oriented X2, calm  Central Line: normal    LABS:    Blood Cultures:                           9.3    5.83  )-----------( 265      ( 20 Mar 2025 07:27 )             29.4         Mean Cell Volume : 99.7 fl  Mean Cell Hemoglobin : 31.5 pg  Mean Cell Hemoglobin Concentration : 31.6 g/dL  Auto Neutrophil # : x  Auto Lymphocyte # : x  Auto Monocyte # : x  Auto Eosinophil # : x  Auto Basophil # : x  Auto Neutrophil % : x  Auto Lymphocyte % : x  Auto Monocyte % : x  Auto Eosinophil % : x  Auto Basophil % : x      03-20    136  |  100  |  15  ----------------------------<  260[H]  4.7   |  22  |  0.78    Ca    9.8      20 Mar 2025 07:28  Phos  2.6     03-20  Mg     1.7     03-20    TPro  5.6[L]  /  Alb  2.8[L]  /  TBili  0.2  /  DBili  x   /  AST  18  /  ALT  19  /  AlkPhos  82  03-19      Mg 1.7  Phos 2.6      PT/INR - ( 19 Mar 2025 07:12 )   PT: 10.8 sec;   INR: 0.94 ratio         PTT - ( 19 Mar 2025 07:12 )  PTT:36.7 sec      Uric Acid 5.6        RADIOLOGY & ADDITIONAL STUDIES:

## 2025-03-20 NOTE — PROGRESS NOTE ADULT - ASSESSMENT
69 year old M with a history of renal transplant (2012) on tacrolimus, s/p left nephrectomy, DLBCL s/p C1 R mini CHOP (chemo held 2/2 recent infections), peripheral neuropathy, hypothyroidism, retroperitoneal fibrosis encasing veins, HTN, HLD, GERD, anxiety/depression, ANGELIKA and recent admission at [facility] (1/17 - 2/6/25) for sepsis 2/2 Klebsiella pneumoniae ESBL UTI and AHRF c/f PJP pneumonia (s/p completion of atovaquone and pred course) presenting after unwitnessed fall at his rehab facility.     On assessment patient oriented to self and place but incoherent and verbally aggressive w tangential thoughts, refusing exam and assessment and unable to answer history questions. Details regarding the fall are limited as collateral information limited. Attempted to call patient's wife who is the emergency contact but was not able to reach. Patient's son was contacted who reports his father "acts this way" when he has infections but was unsure about preceding events.     On arrival febrile to 100.7 rectally, /65, HR 88, RR 18, Sat 100% on RA. RVP neg. UA w mod leuk esterase, neg nitrite, 0-2 wbc. CT head/ c spine neg for acute territorial infarct, hematoma or mass effect. No acute fracture or subluxation of the cervical spine. CT A/P with w severe proctocolitis and cystitis. Received Vanc and zosyn x1, 1 L LR bolus,     Ct C/A/p 3/13 diffusese rectal and sigmoid wall likely due to a severe proctocolitis.Mild bladder wall thickening with adjacent fat stranding, likely due to cystitis.  Low-attenuation lesions within the spleen and central liver, decreased in size from 12/9/2024 compatible positive response to treatment.  Unchanged retroperitoneal soft tissue encasing the aorta and IVC, which could be due to post transplant lymphoproliferative disease given the history of prior kidney transplant or retroperitoneal fibrosis.  Persistent lucency and heterogeneity within the sacrum, which could be due to the patient's lymphoma, though ostium colitis could have a similar appearance in the appropriate clinical setting.  CT chest non-con 3/17: Near complete resolution prior bilateral lung opacities. Small left pleural effusion associated left pleural thickening with left lower lobe atelectasis/round atelectasis.  MRI 3/18:  Abnormal marrow signal throughout the sacrum and left iliac bone. The appearance is nonspecific and may reflect neoplasm or infiltrative process such as lymphoma, stress fractures, infection, and/or osteonecrosis. Comparison is made to prior MRI the pelvis dated 12/02/2024. There has been no significant interval change in the extent of marrow edema centered on the sacrum and left iliac bone, though evaluation is limited by extensive motion artifact on the MRI      # s/p renal transplant 2012   # s/p left nephrectomy  # DLBCL  s/p C1 R mini CHOP (chemo held 2/2 recent infections)  # recent Multifocal pneumonia 1/2025 and fungitell positive treated empirically for PCP - repeat fungitell 392, repeat .  # history of multiple prior transplant related UTIs  # high grade coag negative staph bacteremia  3/4 bottles S hominis, marleny 1/4 S haemolyticus and 1/4 S epi    BC 3/13 pos, Repeat BC 3/16 NGTD  vancomycin 3/13--> daptomycin 3/14-   # candiduria  UA 3/13 0-2 WBC no RBC  UC 3/13 catheterized 10-50K c albicans  # proctocolitis on Ct, Gi PCr positive for   # campylobacter   CMv PCr neg  EBV PCr, pending collection  # erosive changes of the sacrum with no sacral wound 1/23 on Ct A/P, MRI not suggestive of OM  #encephalopathy       Recommendations:  -Continue with Vancomycin, complete 7 days course (through today)  -Follow up repeat fungitell   -Consisder posaconazole ppx   -Patient resumed on chemotherapy (Chao-rituximab-revlimid) - while on rituximab, check Weekly CMV PCR or consider valcyte ppx in this solid organ transplant patient - check CMV IGG as we do not know his serostatus from renal transplant in 2012  -UA blunt, UC with candiduria in c.o catheterized urine, holding off on antifungals unless repeat testing positive  -Gi PCr positive for Campylobacter- ertapenem provides coverage so he has been effectively treated for this. S/p 5 days of ertapenem  -if diarrhea recurs, will need to consider colonoscopy  -Continue bactrim ppx lifelong    Case discussed with attending.      Haydee Cheatham DO, PGY-4   ID Fellow  [employer] Preferred  After 5pm/weekends call [phone]

## 2025-03-20 NOTE — PROGRESS NOTE ADULT - PROBLEM SELECTOR PLAN 2
-Bcx x2 on 3/13 with Staph hominis+staph haemolyticus, vanc and tetracycline sensitive. Per discussion with ID fellow, this may be contaminant as no apparent source and MRI pelvis negative for osteomyelitis  -repeat BCx on 3/16 NGTD  -GI PCR 3/14 Campylobacter  -MR pelvis negative for sacral OM  -c/w bactrim DS daily indefinitely (for PJP infection), valtrex 500mg BID ppx -Bcx x2 on 3/13 with Staph hominis+staph haemolyticus, vanc and tetracycline sensitive. Per discussion with ID fellow, this may be contaminant as no apparent source and MRI pelvis negative for osteomyelitis  -repeat BCx on 3/16 NGTD  -GI PCR 3/14 Campylobacter  -MR pelvis negative for sacral OM  -c/w IV vanc for bacteremia  -c/w bactrim DS daily indefinitely (for PJP infection), valtrex 500mg BID ppx

## 2025-03-20 NOTE — PROGRESS NOTE ADULT - PROBLEM SELECTOR PLAN 1
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   - [place] stage IV as pt has extranodal involvement  -CT A/P on admission with response above and below the diaphragm, effectively in CR1  Treatment hx:  	-12/29/2024: R-miniCHOP x1 with course c/b severe PJP infection so cycle 2 not given   	-2/13/2025: C1 Rituximab/polatuzumab/Revlimid (10mg days 1-14) on a 21-day schedule.  -Started sunil-R2 Cycle 2 on 2/27.   -Today 3/20/25 pt is planned to start C3D1 [id number]. Family to bring in revlimid from home  -c/w ASA while on revlimid  -IgG level 670: will give IVIG 0.4 g/kg x1 for hypogammaglobulinemia  -daily CBC with diff, uric acid, LDH, fibrinogen

## 2025-03-20 NOTE — PROGRESS NOTE ADULT - ASSESSMENT
69M with hx of recently diagnosed DLBCL on polatuzumab-revlimid-rituximab, advanced PTLD, RP fibrosis, DM, HTN, HLD, ESRD on HD s/p renal transplant 2012 (from brother) on tacro, hypothyroidism, recurrent infections (sacral OM, ESBL UTI), admitted for sepsis.     #DLBCL, Stage IV  - diagnosed on liver biopsy 12/16/24 Diffuse large B-cell lymphoma, germinal center B-cell subtype with high proliferation index, SWATHI-negative, Posttransplant (kidney).   - [place] stage IV as pt has extranodal involvement  -CT A/P on admission with response above and below the diaphragm, effectively in CR1  Treatment hx:  	-12/29/2024: R-miniCHOP x1 with course c/b severe PJP infection so cycle 2 not given   	-2/13/2025: C1 Rituximab/polatuzumab/Revlimid (10mg days 1-14) on a 21-day schedule.  -Started sunil-R2 Cycle 2 on 2/27. Today 3/19/25 is C2D21    #Sepsis   -Bcx x2 on 3/13 with Staph hominis+staph haemolyticus, vanc and tetracycline sensitive. Per discussion with ID fellow, this may be contaminant as no apparent source and MRI pelvis negative for osteomyelitis  -repeat BCx on 3/16 NGTD  -GI PCR 3/14 Campylobacter  -MR pelvis negative for sacral OM    Recommend:  -transfuse for Hb>7 and Plt>10  - f/u IgG level  - Please transfer patient to [facility]. C3 sunil-rituximab-revlimid to be given tentatively Thursday 3/20/25  -c/w ASA as pt is on revlimid  -c/w bactrim DS daily, valtrex 500mg BID  -on tacro for renal transplant, redosing pending levels  -daily CBC with diff, uric acid, LDH, fibrinogen  -empiric antibiotics per primary team and ID: on IV vanc  -PET/CT after cycle 3  -[facility]/leukemia service to follow.    Seen and discussed with attending Dr Jose Peng MD PGY-4  Hematology/Oncology Fellow  Available on [place] TEAMS  Pagers: CHERYL [phone]; [facility] [phone]    **For any questions please check [facility] on call or [facility] schedule for heme fellow on call. 69M with hx of recently diagnosed DLBCL on polatuzumab-revlimid-rituximab, advanced PTLD, RP fibrosis, DM, HTN, HLD, ESRD on HD s/p renal transplant 2012 (from brother) on tacro, hypothyroidism, recurrent infections (sacral OM, ESBL UTI), admitted for sepsis. Now transferred to [facility] for C3 Chao-R2.

## 2025-03-20 NOTE — PROGRESS NOTE ADULT - ATTENDING COMMENTS
I independently interviewed and examined JAN CALVIN. I reviewed and discussed the case with Dr Cheatham  and agree with his/her assessment and recommendations with the following additions:    MRI with non specific findings of the iliac bone and sacrum , bone marrow signal though appears unchanged since MRI of 12/2024. At the time had ESBL Ecoli from urinary infection and bacteremia, treated for 10 days only, NOT for osteomyelitis, and thus, the lack of progression speaks against infection in this case      continue initial plan for 7 days of vanco--> dapto--> vancomycin for Staph hominis bacteremia ??    off ertapenem     noted fungitell repeated and now at 393 from >500 back in january, Ct Chest with resolving infilgtrates no clear nodule but fungityell should have come down (if truly positive) with PCP treatment and resolution of infiltrates. So still unclear significance, but consisder posa ppx as we continue chemotherapy + tacro-  No IVIG use recenly, exposure to azithromycin and daptomycin- unclear if these could have contributed but have been quoted in literature as potential associations, so now he is off, we will repeat fungitell    contiinue Chao-rituximab-revlimid  consider brain imaginig with MRI (unclear which is baseline MS)    While on rituximab, check Weekly CMV PCR or consider valcyte ppx in this solid organ transplant patient - check CMV IGG as we do not know his serostatus from renal transplant in 2012  Today patient is aggressive and insulting  presumably MS is better since admission , No brain imaging    Ct head 'No CT evidence for acute demarcated territorial infarction No   intracranial hemorrhages, midline shift or mass effect is demonstrated.   Mild parenchymal volume loss with prominent sulci and lateral ventricles.   Patchy hypoattenuation in the deep cerebral white matter, nonspecific but   statistically favoring chronic microvascular changes. Benign   mineralization in the basal ganglia bilaterally.    Ventricles are normal in size and configuration. The perimesencephalic   cisterns are intact. No hydrocephalus.    No abnormal accumulation in extra-axial spaces.    Partially visualized paranasal sinuses are clear. Mastoids are patent.   Intraocular lens replacement bilaterally. Calvarium is intact."          Thank you for involving us in the care of this patient  Transplant ID will continue to follow  Please call or page with additional questions  Pager; #[phone]  Teams: from 8 am to 5 pm  Suzie Argueta MD I independently interviewed and examined JAN CALVIN. I reviewed and discussed the case with Dr Cheatham  and agree with his/her assessment and recommendations with the following additions:    MRI with non specific findings of the iliac bone and sacrum , bone marrow signal though appears unchanged since MRI of 12/2024. At the time had ESBL Ecoli from urinary infection and bacteremia, treated for 10 days only, NOT for osteomyelitis, and thus, the lack of progression speaks against infection in this case      continue initial plan for 7 days of vanco--> dapto--> vancomycin for Staph hominis bacteremia ??  Final day 3/21, can be discharged after that with follow up of repeat fungitell    off ertapenem     noted fungitell repeated and now at 393 from >500 back in january, Ct Chest with resolving infilgtrates no clear nodule but fungityell should have come down (if truly positive) with PCP treatment and resolution of infiltrates. So still unclear significance, but consisder posa ppx as we continue chemotherapy + tacro-  No IVIG use recenly, exposure to azithromycin and daptomycin- unclear if these could have contributed but have been quoted in literature as potential associations, so now he is off, we will repeat fungitell    contiinue Chao-rituximab-revlimid  consider brain imaginig with MRI (unclear which is baseline MS)    While on rituximab, check Weekly CMV PCR or consider valcyte ppx in this solid organ transplant patient - check CMV IGG as we do not know his serostatus from renal transplant in 2012  Today patient is aggressive and insulting  presumably MS is better since admission , No brain imaging    Ct head 'No CT evidence for acute demarcated territorial infarction No   intracranial hemorrhages, midline shift or mass effect is demonstrated.   Mild parenchymal volume loss with prominent sulci and lateral ventricles.   Patchy hypoattenuation in the deep cerebral white matter, nonspecific but   statistically favoring chronic microvascular changes. Benign   mineralization in the basal ganglia bilaterally.    Ventricles are normal in size and configuration. The perimesencephalic   cisterns are intact. No hydrocephalus.    No abnormal accumulation in extra-axial spaces.    Partially visualized paranasal sinuses are clear. Mastoids are patent.   Intraocular lens replacement bilaterally. Calvarium is intact."          Thank you for involving us in the care of this patient  Transplant ID will  sign off  Please call or page with additional questions  Pager; #[phone]  Teams: from 8 am to 5 pm  Suzie Argueta MD

## 2025-03-20 NOTE — PROGRESS NOTE ADULT - TIME BILLING
face-to-face encounter, review of extensive medical records in this and prior charts, laboratory findings, radiographic and microbiology results; documentation as noted above and discussion of diagnostic impressions and plan with the patient and team
- Ordering, reviewing, and interpreting labs, testing, and imaging.  - Independently obtaining a review of systems and performing a physical exam  - Reviewing prior hospitalization and where necessary, outpatient records.  - Counselling and educating patient and family regarding interpretation of aforementioned items and plan of care.

## 2025-03-20 NOTE — PROGRESS NOTE ADULT - ATTENDING COMMENTS
.  Primary: Kellogg    Vital Signs Last 24 Hrs  T(C): 36.6 (20 Mar 2025 00:44), Max: 36.8 (19 Mar 2025 17:01)  T(F): 97.9 (20 Mar 2025 00:44), Max: 98.2 (19 Mar 2025 17:01)  HR: 69 (20 Mar 2025 00:44) (68 - 71)  BP: 147/69 (20 Mar 2025 00:44) (147/69 - 158/76)  BP(mean): --  RR: 18 (20 Mar 2025 00:44) (18 - 18)  SpO2: 98% (20 Mar 2025 00:44) (97% - 98%)    Parameters below as of 20 Mar 2025 00:44  Patient On (Oxygen Delivery Method): room air    MEDICATIONS  (STANDING):  aspirin enteric coated 81 milliGRAM(s) Oral daily  buPROPion XL (24-Hour) . 300 milliGRAM(s) Oral daily  chlorhexidine 2% Cloths 1 Application(s) Topical <User Schedule>  cloNIDine 0.1 milliGRAM(s) Oral three times a day  dextrose 5%. 1000 milliLiter(s) (100 mL/Hr) IV Continuous <Continuous>  dextrose 5%. 1000 milliLiter(s) (50 mL/Hr) IV Continuous <Continuous>  dextrose 50% Injectable 25 Gram(s) IV Push once  dextrose 50% Injectable 12.5 Gram(s) IV Push once  dextrose 50% Injectable 25 Gram(s) IV Push once  enoxaparin Injectable 40 milliGRAM(s) SubCutaneous every 24 hours  escitalopram 10 milliGRAM(s) Oral daily  glucagon  Injectable 1 milliGRAM(s) IntraMuscular once  hydrALAZINE 100 milliGRAM(s) Oral every 8 hours  insulin glargine Injectable (LANTUS) 20 Unit(s) SubCutaneous at bedtime  insulin lispro (ADMELOG) corrective regimen sliding scale   SubCutaneous three times a day before meals  insulin lispro (ADMELOG) corrective regimen sliding scale   SubCutaneous at bedtime  insulin lispro Injectable (ADMELOG) 16 Unit(s) SubCutaneous three times a day before meals  lactated ringers. 1000 milliLiter(s) (75 mL/Hr) IV Continuous <Continuous>  levothyroxine 88 MICROGram(s) Oral daily  melatonin 3 milliGRAM(s) Oral at bedtime  pantoprazole    Tablet 40 milliGRAM(s) Oral before breakfast  polyethylene glycol 3350 17 Gram(s) Oral two times a day  predniSONE   Tablet 5 milliGRAM(s) Oral daily  QUEtiapine 25 milliGRAM(s) Oral at bedtime  rosuvastatin 10 milliGRAM(s) Oral at bedtime  senna 2 Tablet(s) Oral at bedtime  tacrolimus 1.5 milliGRAM(s) Oral two times a day  trimethoprim  160 mG/sulfamethoxazole 800 mG 1 Tablet(s) Oral daily  valACYclovir 500 milliGRAM(s) Oral every 12 hours  vancomycin  IVPB 1000 milliGRAM(s) IV Intermittent every 12 hours      Assessment: 69-year-old day 1 cycle 3 Rituximab/polatuzumab for post renal transplant, advanced stage PTLD.   Course complicated by severe PCP infection on day 1 of cycle 2 miniRCHOP (and cycle 2 was not given); currently complicated by bacteremia (3/13/25) Staph hominis+staph haemolyticus and Gi PCr + for Campylobacter.     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    Plan:  Heme:   Start sunil/rev/rituxmab   ASA secondary to Revlimid   Scot has an elevated ferritin: continue iron sucrose, the low iron saturation is from Hepcidin; Please re-check saturation    Radiology: MRI [facility] (3/18/25): Comparison is made to prior MRI the pelvis dated 2024. There has been no significant interval change in the extent of marrow edema centered on the sacrum and left iliac bone, though evaluation is limited by extensive motion artifact on the MRI. Patchy signal in the right iliac bone adjacent to the right sacroiliac joint is slightly more prominent though limited by the motion artifact on prior exam.    CT chest (3/17/25): no adenopathy, CT Abd (3/13/25): near resolution of hepatic (lymphoma) lesion    ID: valtrex/vanco/bactrim, Ig ; please infuse 0.4grams/kg IVIg      Renal transplant: pred and tacro    DVT prophylaxis: please change to LWHx.    Over 60 minutes were spent in direct patient, non-resident teaching, care and care coordination today. .  Primary: Kouts    Vital Signs Last 24 Hrs  T(C): 36.6 (20 Mar 2025 00:44), Max: 36.8 (19 Mar 2025 17:01)  T(F): 97.9 (20 Mar 2025 00:44), Max: 98.2 (19 Mar 2025 17:01)  HR: 69 (20 Mar 2025 00:44) (68 - 71)  BP: 147/69 (20 Mar 2025 00:44) (147/69 - 158/76)  BP(mean): --  RR: 18 (20 Mar 2025 00:44) (18 - 18)  SpO2: 98% (20 Mar 2025 00:44) (97% - 98%)    Parameters below as of 20 Mar 2025 00:44  Patient On (Oxygen Delivery Method): room air    MEDICATIONS  (STANDING):  aspirin enteric coated 81 milliGRAM(s) Oral daily  buPROPion XL (24-Hour) . 300 milliGRAM(s) Oral daily  chlorhexidine 2% Cloths 1 Application(s) Topical <User Schedule>  cloNIDine 0.1 milliGRAM(s) Oral three times a day  dextrose 5%. 1000 milliLiter(s) (100 mL/Hr) IV Continuous <Continuous>  dextrose 5%. 1000 milliLiter(s) (50 mL/Hr) IV Continuous <Continuous>  dextrose 50% Injectable 25 Gram(s) IV Push once  dextrose 50% Injectable 12.5 Gram(s) IV Push once  dextrose 50% Injectable 25 Gram(s) IV Push once  enoxaparin Injectable 40 milliGRAM(s) SubCutaneous every 24 hours  escitalopram 10 milliGRAM(s) Oral daily  glucagon  Injectable 1 milliGRAM(s) IntraMuscular once  hydrALAZINE 100 milliGRAM(s) Oral every 8 hours  insulin glargine Injectable (LANTUS) 20 Unit(s) SubCutaneous at bedtime  insulin lispro (ADMELOG) corrective regimen sliding scale   SubCutaneous three times a day before meals  insulin lispro (ADMELOG) corrective regimen sliding scale   SubCutaneous at bedtime  insulin lispro Injectable (ADMELOG) 16 Unit(s) SubCutaneous three times a day before meals  lactated ringers. 1000 milliLiter(s) (75 mL/Hr) IV Continuous <Continuous>  levothyroxine 88 MICROGram(s) Oral daily  melatonin 3 milliGRAM(s) Oral at bedtime  pantoprazole    Tablet 40 milliGRAM(s) Oral before breakfast  polyethylene glycol 3350 17 Gram(s) Oral two times a day  predniSONE   Tablet 5 milliGRAM(s) Oral daily  QUEtiapine 25 milliGRAM(s) Oral at bedtime  rosuvastatin 10 milliGRAM(s) Oral at bedtime  senna 2 Tablet(s) Oral at bedtime  tacrolimus 1.5 milliGRAM(s) Oral two times a day  trimethoprim  160 mG/sulfamethoxazole 800 mG 1 Tablet(s) Oral daily  valACYclovir 500 milliGRAM(s) Oral every 12 hours  vancomycin  IVPB 1000 milliGRAM(s) IV Intermittent every 12 hours      Assessment: 69-year-old day 1 cycle 3 Rituximab/polatuzumab for post renal transplant, advanced stage PTLD.   Course complicated by severe PCP infection on day 1 of cycle 2 miniRCHOP (and cycle 2 was not given); currently complicated by bacteremia (3/13/25) Staph hominis+staph haemolyticus and Gi PCr + for Campylobacter.     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    Plan:  Heme:   Start sunil/rev/rituxmab   ASA secondary to Revlimid   Scot has an elevated ferritin: continue iron sucrose, the low iron saturation is from Hepcidin; Please re-check saturation    Radiology: MRI [facility] (3/18/25): Comparison is made to prior MRI the pelvis dated 2024. There has been no significant interval change in the extent of marrow edema centered on the sacrum and left iliac bone, though evaluation is limited by extensive motion artifact on the MRI. Patchy signal in the right iliac bone adjacent to the right sacroiliac joint is slightly more prominent though limited by the motion artifact on prior exam.    CT chest (3/17/25): no adenopathy, CT Abd (3/13/25): near resolution of hepatic (lymphoma) lesion    ID: valtrex/vanco/bactrim, Ig ; please infuse 0.4grams/kg IVIg      Renal transplant: pred and tacro    DVT prophylaxis: LWHx.    Over 60 minutes were spent in direct patient, non-resident teaching, care and care coordination today.

## 2025-03-20 NOTE — PROGRESS NOTE ADULT - SUBJECTIVE AND OBJECTIVE BOX
Chief Complaint: Diabetes Mellitus follow up    INTERVAL HX:  Patient seen at bedside. Patient is confused.   He was eating lunch, finishing all food on each tray.   BG over the last 24 hrs have been within goal range 100-180mg/dl. No hypoglycemia.     Review of Systems:  General: As above  GI: No nausea, vomiting  Endocrine: no  S&Sx of hypoglycemia    Allergies    No Known Allergies    Intolerances      MEDICATIONS  (STANDING):  acetaminophen     Tablet .. 650 milliGRAM(s) Oral once  aspirin enteric coated 81 milliGRAM(s) Oral daily  buPROPion XL (24-Hour) . 300 milliGRAM(s) Oral daily  chlorhexidine 2% Cloths 1 Application(s) Topical <User Schedule>  cloNIDine 0.1 milliGRAM(s) Oral three times a day  dextrose 5%. 1000 milliLiter(s) (100 mL/Hr) IV Continuous <Continuous>  dextrose 5%. 1000 milliLiter(s) (50 mL/Hr) IV Continuous <Continuous>  dextrose 50% Injectable 25 Gram(s) IV Push once  dextrose 50% Injectable 12.5 Gram(s) IV Push once  dextrose 50% Injectable 25 Gram(s) IV Push once  diphenhydrAMINE 25 milliGRAM(s) Oral once  enoxaparin Injectable 40 milliGRAM(s) SubCutaneous every 24 hours  escitalopram 10 milliGRAM(s) Oral daily  glucagon  Injectable 1 milliGRAM(s) IntraMuscular once  hydrALAZINE 100 milliGRAM(s) Oral every 8 hours  immune   globulin 10% (GAMMAGARD) IVPB 30 Gram(s) IV Intermittent once  insulin glargine Injectable (LANTUS) 20 Unit(s) SubCutaneous at bedtime  insulin lispro (ADMELOG) corrective regimen sliding scale   SubCutaneous three times a day before meals  insulin lispro (ADMELOG) corrective regimen sliding scale   SubCutaneous at bedtime  insulin lispro Injectable (ADMELOG) 16 Unit(s) SubCutaneous three times a day before meals  lactated ringers. 1000 milliLiter(s) (75 mL/Hr) IV Continuous <Continuous>  levothyroxine 88 MICROGram(s) Oral daily  melatonin 3 milliGRAM(s) Oral at bedtime  pantoprazole    Tablet 40 milliGRAM(s) Oral before breakfast  polatuzumab vedotin-piiq (POLIVY) IVPB (eMAR) 134 milliGRAM(s) IV Intermittent once  polyethylene glycol 3350 17 Gram(s) Oral two times a day  predniSONE   Tablet 5 milliGRAM(s) Oral daily  QUEtiapine 25 milliGRAM(s) Oral at bedtime  rosuvastatin 10 milliGRAM(s) Oral at bedtime  senna 2 Tablet(s) Oral at bedtime  tacrolimus 1.5 milliGRAM(s) Oral two times a day  trimethoprim  160 mG/sulfamethoxazole 800 mG 1 Tablet(s) Oral daily  valACYclovir 500 milliGRAM(s) Oral every 12 hours  vancomycin  IVPB 1000 milliGRAM(s) IV Intermittent every 12 hours        insulin glargine Injectable (LANTUS)   20 Unit(s) SubCutaneous (03-19-25 @ 21:54)    insulin lispro (ADMELOG) corrective regimen sliding scale   8 Unit(s) SubCutaneous (03-20-25 @ 08:25)    insulin lispro Injectable (ADMELOG)   16 Unit(s) SubCutaneous (03-20-25 @ 08:25)   16 Unit(s) SubCutaneous (03-19-25 @ 18:56)    levothyroxine   88 MICROGram(s) Oral (03-20-25 @ 05:42)    predniSONE   Tablet   5 milliGRAM(s) Oral (03-20-25 @ 05:42)    rosuvastatin   10 milliGRAM(s) Oral (03-19-25 @ 20:53)        PHYSICAL EXAM:  VITALS: T(C): 36.8 (03-20-25 @ 13:45)  T(F): 98.3 (03-20-25 @ 13:45), Max: 98.3 (03-20-25 @ 13:45)  HR: 69 (03-20-25 @ 13:45) (66 - 71)  BP: 141/71 (03-20-25 @ 13:45) (141/71 - 158/76)  RR:  (16 - 18)  SpO2:  (97% - 100%)  Wt(kg): --  GENERAL: NAD  Respiratory: Respirations unlabored   Extremities: Warm, no edema  NEURO: Alert , appropriate     LABS:  POCT Blood Glucose.: 239 mg/dL (03-20-25 @ 11:55)  POCT Blood Glucose.: 324 mg/dL (03-20-25 @ 08:13)  POCT Blood Glucose.: 199 mg/dL (03-19-25 @ 21:12)  POCT Blood Glucose.: 217 mg/dL (03-19-25 @ 18:55)  POCT Blood Glucose.: 126 mg/dL (03-19-25 @ 16:49)  POCT Blood Glucose.: 187 mg/dL (03-19-25 @ 11:59)  POCT Blood Glucose.: 257 mg/dL (03-19-25 @ 08:15)  POCT Blood Glucose.: 182 mg/dL (03-19-25 @ 02:57)  POCT Blood Glucose.: 252 mg/dL (03-18-25 @ 21:53)  POCT Blood Glucose.: 309 mg/dL (03-18-25 @ 16:52)  POCT Blood Glucose.: 293 mg/dL (03-18-25 @ 12:00)  POCT Blood Glucose.: 344 mg/dL (03-18-25 @ 08:31)  POCT Blood Glucose.: 306 mg/dL (03-17-25 @ 21:37)  POCT Blood Glucose.: 248 mg/dL (03-17-25 @ 16:53)                          9.3    5.83  )-----------( 265      ( 20 Mar 2025 07:27 )             29.4     03-20    136  |  100  |  15  ----------------------------<  260[H]  4.7   |  22  |  0.78    Ca    9.8      20 Mar 2025 07:28  Phos  2.6     03-20  Mg     1.7     03-20    TPro  5.6[L]  /  Alb  2.8[L]  /  TBili  0.2  /  DBili  x   /  AST  18  /  ALT  19  /  AlkPhos  82  03-19    eGFR: 98 mL/min/1.73m2 (20 Mar 2025 07:28)      Thyroid Function Tests:  03-19 @ 07:12 TSH 1.94 FreeT4 -- T3 -- Anti TPO -- Anti Thyroglobulin Ab -- TSI --  03-13 @ 15:26 TSH 4.47 FreeT4 -- T3 -- Anti TPO -- Anti Thyroglobulin Ab -- TSI --      A1C with Estimated Average Glucose Result: 10.1 % (03-14-25 @ 07:08)  A1C with Estimated Average Glucose Result: 9.6 % (03-13-25 @ 15:26)  A1C with Estimated Average Glucose Result: 8.4 % (01-28-25 @ 07:38)  A1C with Estimated Average Glucose Result: 7.8 % (12-29-24 @ 07:07)  A1C with Estimated Average Glucose Result: 7.7 % (12-28-24 @ 10:06)    Estimated Average Glucose: 243 mg/dL (03-14-25 @ 07:08)  Estimated Average Glucose: 229 mg/dL (03-13-25 @ 15:26)  Estimated Average Glucose: 194 mg/dL (01-28-25 @ 07:38)  Estimated Average Glucose: 177 mg/dL (12-29-24 @ 07:07)  Estimated Average Glucose: 174 mg/dL (12-28-24 @ 10:06)        Diet, Consistent Carbohydrate w/Evening Snack (03-14-25 @ 11:41) [Active]

## 2025-03-20 NOTE — PHARMACOTHERAPY INTERVENTION NOTE - COMMENTS
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, SWATHI-negative), s/p R mini CHOP x 1 cycle f/b Polatuzumab + Rituximab + Lenalidomide x 2 cycles, course complicated by PCP PNA, and now admitted for sepsis    Antimicrobial Course (Tx ID Following Suzie Underwood):   - Vancomycin 3/13- 3/14  --> Daptomycin (infx vs contaminant)- 3/14- 3/17  --> Vancomycin (staph haemolyticus/ hominis)- 3/18-   - Valtrex- 3/13  - Bactrim- 3/13  - Zosyn- 3/13 x 1  --> Ertapenem (campylobacter colitis per GI-PCR)- 3/13- 3/17  - Azithromycin (campylobacter colitis per GI-PCR)- 3/16- 3/17  MRSA nasal swab    Last Neutropenic (ANC<1000): no occurrence  Last Febrile: no occurrence  Days Non-Neutropenic: >7  Days afebrile: >7    Chemotherapy Course  -Current Regimen: Chao R2  History:  (12/28/24)- C1 mini-RCHOP  (2/13/25) C1 Polatuzumab + Rituximab + Lenalidomide - 21-day sched  - Rituximab 375mg/m2 IVPB D1  - Polatuzumab 1.8mg/kg IVPB D1  - Lenalidomide 10mg PO D1-14 (7 days off)  (2/27/25) C2  (3/20/25)- C3  -Day: 1 (3/20)  BmBx:  Access:     History/Relevant clinical information used in assessment:  - 1/23- adrenal insufficiency- hydrocortisone 25mg q8hr tid x 3 doses given yest 1/22- cont hydrocortisone 25mg IVP tid  - 1/27- CT 1/23- possible PCP PNA- worsened from 1/17-  Bactrim IV 320mg q8hr 1/24 (12.5mg/kg- using range of 10-12mg/kg which is acceptable for PCP showing similar efficacy as 15mg/kg; given pt's kidney transplant status, this will mitigate renal risk  Prednisone 40mg PO BID x5d (1/24- 1/28), then 40mg daily x5d (1/29- 2/2), followed by 20mg daily for 11 days (2/3-)     Assessment/Plan/Recommendation:  Onc:  - spoke to pt wife Ludmila- she will bring in Revlimid 10mg PO daily D1-14, 7 days off (new auth sent for March) this afternoon; will verify as pt own med  - discussed AC on rev- wants lovenox & aspirin consurrently  ID:  - BlCx 3/13- staph haemolyticus/ hominis, on vanco  ---------------------------------------------------------  CrCl vs Cystatin-C EGFR (1/17/25)  - CrCl= 95ml/min (scr= 0.81  - Cystatin-C EGFR= 40ml/min (Cystatin-C= 1.63)  - 58% dose difference    Additional Monitoring Needed?   -Yes- Continue to monitor renal function & daily counts for abx escalation/de-escalation   -Discharge Planning:  --> New meds:  --> Meds sent for auth:  --> Delivered meds:    Case discussed with attending/primary team    Gisella Cuba, PharmD, BCPS  Clinical Pharmacy Specialist | Hematology/Oncology  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), s/p R mini CHOP x 1 cycle f/b Polatuzumab + Rituximab + Lenalidomide x 2 cycles, course complicated by PCP PNA, and now admitted for sepsis    Antimicrobial Course (Tx ID Following Suzie Underwood):   - Vancomycin 3/13- 3/14  --> Daptomycin (infx vs contaminant)- 3/14- 3/17  --> Vancomycin (staph haemolyticus/ hominis)- 3/18-   - Valtrex- 3/13  - Bactrim (ppx w/ h/o PCP)- 3/13  - Zosyn- 3/13 x 1  --> Ertapenem (campylobacter colitis per GI-PCR)- 3/13- 3/17  - Azithromycin (campylobacter colitis per GI-PCR)- 3/16- 3/17  MRSA nasal swab    Last Neutropenic (ANC<1000): no occurrence  Last Febrile: no occurrence  Days Non-Neutropenic: >7  Days afebrile: >7    Chemotherapy Course  -Current Regimen: Chao R2  History:  (12/28/24)- C1 mini-RCHOP  (2/13/25) C1 Polatuzumab + Rituximab + Lenalidomide - 21-day sched  - Rituximab 375mg/m2 IVPB D1  - Polatuzumab 1.8mg/kg IVPB D1  - Lenalidomide 10mg PO D1-14 (7 days off)  (2/27/25) C2  (3/20/25)- C3  -Day: 1 (3/20)  BmBx:  Access:     Vancomycin Levels:   Date    Dose        Value                 Ref Range	                         Status   3/20    1gm 12h   13.4/AUC= 498 Trough 10-20; AUC= 400-600	  Final    History/Relevant clinical information used in assessment:  - 1/23- adrenal insufficiency- hydrocortisone 25mg q8hr tid x 3 doses given yest 1/22- cont hydrocortisone 25mg IVP tid  - 1/27- CT 1/23- possible PCP PNA- worsened from 1/17-  Bactrim IV 320mg q8hr 1/24 (12.5mg/kg- using range of 10-12mg/kg which is acceptable for PCP showing similar efficacy as 15mg/kg; given pt's kidney transplant status, this will mitigate renal risk  Prednisone 40mg PO BID x5d (1/24- 1/28), then 40mg daily x5d (1/29- 2/2), followed by 20mg daily for 11 days (2/3-)     Assessment/Plan/Recommendation:  Onc:  - spoke to pt gordo Keys- she will bring in Revlimid 10mg PO qhs D1-14, 7 days off (new auth sent for March) this afternoon; Entered lenalidomide 10g po qhs as Pt Own Med- confirmed pt has own med; Medication verified medication is in its original container; confirmed dose and frequency as prescribed; Identified medication through Drug ID   - changed chao to 30mins since 3rd cycle w/ no IRR's  - discussed AC on rev- wants lovenox & aspirin concurrently  ID:  - BlCx 3/13- staph haemolyticus/ hominis, on vanco  - VT= 13.4 (3/20), which yields AUC= 498 per [employer]- vanco therapeutic at 1gm q12hr; -duration 7 ays per ID, today is Day 8- d/c?  - consider posa given resolved CT chest but still elevated fungitell? (>500--> 393)  ---------------------------------------------------------  CrCl vs Cystatin-C EGFR (1/17/25)  - CrCl= 95ml/min (scr= 0.81  - Cystatin-C EGFR= 40ml/min (Cystatin-C= 1.63)  - 58% dose difference    Additional Monitoring Needed?   -Yes- Continue to monitor renal function & daily counts for abx escalation/de-escalation   -Discharge Planning:  --> New meds:  --> Meds sent for auth:  --> Delivered meds:    Case discussed with attending/primary team    Gisella Cuba, PharmD, BCPS  Clinical Pharmacy Specialist | Hematology/Oncology  [facility]  Email: [email or web address] or available on [employer]

## 2025-03-20 NOTE — PROGRESS NOTE ADULT - PROBLEM SELECTOR PLAN 1
Inpatient Plan:  - Check BG TID AC, HS, and 2AM while on PO diet   - Adjust Lantus to 24units QHS  - Adjust Admelog to 20units TID AC (HOLD if NPO)  - C/w moderate dose Admelog correctional scale TID AC   - C/w low dose Admelog correctional scale HS and 2AM    Discharge Plan:  - Basal/bolus, doses TBD closer to d/c  - Patient should check BG TID AC and HS at home/rehab. Please tell patient/RNs to contact Endocrinologist or provider at rehab if BG <70mg/dL x1 or >200mg/dL consistently or >400mg/dL x1.   - Endocrine follow up: can f/u with Nick Moore or can establish care at [address] [place] [address] ([phone])- please provide in d/c paperwork for patient to make appt once d/c from rehab.   - Recommend routine outpatient ophthalmology and podiatry follow up.  - Please write Rxs for: Solo Star Insulin pen/Humalog Kwik insulin pen ( can prescribe any insulin analog equivalent covered by pt's insurance)/ Maria E insulin pen needles/glucose meter/strips/lancets. Glucose tablets/gel> use as directed plus Glucagon nasal/ IM injection (use as directed)>Can prescribe any covered by pt's insurance.

## 2025-03-20 NOTE — PROGRESS NOTE ADULT - SUBJECTIVE AND OBJECTIVE BOX
Interval History/ROS:  Patient is aggitated  not participating in ROS and refuses to be examined      Allergies  No Known Allergies        ANTIMICROBIALS:    trimethoprim  160 mG/sulfamethoxazole 800 mG 1 daily  valACYclovir 500 every 12 hours  vancomycin  IVPB 1000 every 12 hours      OTHER MEDS: MEDICATIONS  (STANDING):  acetaminophen     Tablet .. 650 once  aluminum hydroxide/magnesium hydroxide/simethicone Suspension 30 every 4 hours PRN  aspirin enteric coated 81 daily  buPROPion XL (24-Hour) . 300 daily  cloNIDine 0.1 three times a day  dextrose 50% Injectable 25 once  dextrose 50% Injectable 12.5 once  dextrose 50% Injectable 25 once  dextrose Oral Gel 15 once PRN  diphenhydrAMINE 25 once  enoxaparin Injectable 40 every 24 hours  escitalopram 10 daily  glucagon  Injectable 1 once  hydrALAZINE 100 every 8 hours  immune   globulin 10% (GAMMAGARD) IVPB 30 once  insulin glargine Injectable (LANTUS) 24 at bedtime  insulin lispro (ADMELOG) corrective regimen sliding scale  three times a day before meals  insulin lispro (ADMELOG) corrective regimen sliding scale  at bedtime  insulin lispro Injectable (ADMELOG) 20 three times a day before meals  lenalidomide 10 at bedtime  levothyroxine 88 daily  melatonin 3 at bedtime  OLANZapine Injectable 2.5 every 6 hours PRN  ondansetron Injectable 4 every 8 hours PRN  pantoprazole    Tablet 40 before breakfast  polatuzumab vedotin-piiq (POLIVY) IVPB (eMAR) 134 once  polyethylene glycol 3350 17 two times a day  predniSONE   Tablet 5 daily  QUEtiapine 25 at bedtime  rosuvastatin 10 at bedtime  senna 2 at bedtime  tacrolimus 1.5 two times a day      Vital Signs Last 24 Hrs  T(F): 97.9 (03-20-25 @ 14:36), Max: 98.6 (03-16-25 @ 00:00)    Vital Signs Last 24 Hrs  HR: 66 (03-20-25 @ 14:36) (66 - 71)  BP: 144/69 (03-20-25 @ 14:36) (141/71 - 148/68)  RR: 16 (03-20-25 @ 14:36)  SpO2: 98% (03-20-25 @ 14:36) (97% - 100%)  Wt(kg): --    EXAM:  refuses physical exam      Labs:                        9.3    5.83  )-----------( 265      ( 20 Mar 2025 07:27 )             29.4     03-20    136  |  100  |  15  ----------------------------<  260[H]  4.7   |  22  |  0.78    Ca    9.8      20 Mar 2025 07:28  Phos  2.6     03-20  Mg     1.7     03-20    TPro  5.6[L]  /  Alb  2.8[L]  /  TBili  0.2  /  DBili  x   /  AST  18  /  ALT  19  /  AlkPhos  82  03-19      WBC Trend:  WBC Count: 5.83 (03-20-25 @ 07:27)  WBC Count: 6.04 (03-19-25 @ 07:12)  WBC Count: 5.74 (03-18-25 @ 07:07)      Creatine Trend:  Creatinine: 0.78 (03-20)  Creatinine: 0.88 (03-19)  Creatinine: 0.75 (03-18)  Creatinine: 0.86 (03-17)      Liver Biochemical Testing Trend:  Alanine Aminotransferase (ALT/SGPT): 19 (03-19)  Alanine Aminotransferase (ALT/SGPT): 23 (03-18)  Alanine Aminotransferase (ALT/SGPT): 22 (03-13)  Alanine Aminotransferase (ALT/SGPT): 34 (02-27)  Alanine Aminotransferase (ALT/SGPT): 25 (02-04)  Aspartate Aminotransferase (AST/SGOT): 18 (03-19-25 @ 07:12)  Aspartate Aminotransferase (AST/SGOT): 16 (03-18-25 @ 07:08)  Aspartate Aminotransferase (AST/SGOT): 25 (03-13-25 @ 15:26)  Aspartate Aminotransferase (AST/SGOT): 29 (02-27-25 @ 11:49)  Aspartate Aminotransferase (AST/SGOT): 23 (02-04-25 @ 07:41)  Bilirubin Total: 0.2 (03-19)  Bilirubin Total: 0.2 (03-18)  Bilirubin Total: 0.6 (03-13)  Bilirubin Total: 0.3 (02-27)  Bilirubin Total: 0.4 (02-04)      Trend LDH  03-20-25 @ 07:28  147  03-18-25 @ 07:08  153  02-27-25 @ 11:49  201  01-28-25 @ 07:34  517[H]  01-27-25 @ 07:00  519[H]            MICROBIOLOGY:  Vancomycin Level, Trough: 13.4 (03-20 @ 07:27)  Vancomycin Level, Random: 4.7 (03-14 @ 07:08)    MRSA PCR Result.: NotDetec (03-14-25 @ 16:11)  MRSA PCR Result.: NotDetec (03-13-25 @ 16:55)  MRSA PCR Result.: NotDetec (01-21-25 @ 17:23)      Culture - Blood (collected 16 Mar 2025 13:35)  Source: Blood Blood  Preliminary Report:    No growth at 72 Hours    Culture - Urine (collected 13 Mar 2025 16:08)  Source: Catheterized Catheterized  Final Report:    10,000 - 49,000 CFU/mL Candida albicans    "Susceptibilities not performed"    Culture - Blood (collected 13 Mar 2025 15:06)  Source: Blood Blood-Peripheral  Final Report:    Growth in aerobic and anaerobic bottles: Staphylococcus hominis    Growth in anaerobic bottle: Staphylococcus epidermidis    "Susceptibilities not performed"    Culture - Blood (collected 13 Mar 2025 15:01)  Source: Blood Blood-Peripheral  Final Report:    Growth in aerobic bottle: Staphylococcus haemolyticus    Growth in aerobic bottle: Staphylococcus hominis    Direct identification is available within approximately 3-5    hours either by Blood Panel Multiplexed PCR or Direct    MALDI-TOF. Details: [email or web address]  Organism: Blood Culture PCR  Staphylococcus haemolyticus  Staphylococcus hominis  Organism: Staphylococcus hominis    Sensitivities:      Method Type: CHRIST      -  Clindamycin: S <=0.25      -  Erythromycin: R >4      -  Gentamicin: S <=4 Should not be used as monotherapy      -  Oxacillin: R >2      -  Penicillin: R >2      -  Rifampin: S <=1 Should not be used as monotherapy      -  Tetracycline: S <=4      -  Trimethoprim/Sulfamethoxazole: R >2/38      -  Vancomycin: S 0.5  Organism: Staphylococcus haemolyticus    Sensitivities:      Method Type: CHRIST      -  Clindamycin: R >4      -  Erythromycin: R >4      -  Gentamicin: R >8 Should not be used as monotherapy      -  Oxacillin: R >2      -  Penicillin: R >2      -  Rifampin: R >2 Should not be used as monotherapy      -  Tetracycline: S <=4      -  Trimethoprim/Sulfamethoxazole: R >2/38      -  Vancomycin: S 2  Organism: Blood Culture PCR    Sensitivities:      Method Type: PCR      -  Coagulase negative Staphylococcus: Detec    Culture - Blood (collected 24 Jan 2025 00:46)  Source: .Blood BLOOD  Final Report:    No growth at 5 days    Culture - Blood (collected 24 Jan 2025 00:18)  Source: .Blood BLOOD  Final Report:    No growth at 5 days    Culture - Urine (collected 22 Jan 2025 05:05)  Source: Clean Catch Clean Catch (Midstream)  Final Report:    10,000 - 49,000 CFU/mL Candida albicans    "Susceptibilities not performed"    Culture - Blood (collected 21 Jan 2025 22:25)  Source: .Blood BLOOD  Final Report:    No growth at 5 days    Culture - Blood (collected 21 Jan 2025 22:07)  Source: .Blood BLOOD  Final Report:    No growth at 5 days    Culture - Blood (collected 19 Jan 2025 18:36)  Source: .Blood BLOOD  Final Report:    No growth at 5 days      HIV-1/2 Combo Result: Nonreact (12-27-24 @ 13:43)        Cytomegalovirus By PCR: NotDetec IU/mL (03-16-25 @ 13:43)              Fungitell: 392 pg/mL (03-16 @ 13:43)            Ferritin: 569 (03-14)      Lactate Dehydrogenase, Serum: 147 (03-20)  Lactate Dehydrogenase, Serum: 153 (03-18)            RADIOLOGY:  imaging below personally reviewed

## 2025-03-21 ENCOUNTER — TRANSCRIPTION ENCOUNTER (OUTPATIENT)
Age: 70
End: 2025-03-21

## 2025-03-21 VITALS
RESPIRATION RATE: 17 BRPM | DIASTOLIC BLOOD PRESSURE: 78 MMHG | OXYGEN SATURATION: 98 % | HEART RATE: 63 BPM | TEMPERATURE: 98 F | SYSTOLIC BLOOD PRESSURE: 166 MMHG

## 2025-03-21 LAB
ALBUMIN SERPL ELPH-MCNC: 3 G/DL — LOW (ref 3.3–5)
ALP SERPL-CCNC: 82 U/L — SIGNIFICANT CHANGE UP (ref 40–120)
ALT FLD-CCNC: 16 U/L — SIGNIFICANT CHANGE UP (ref 10–45)
ANION GAP SERPL CALC-SCNC: 10 MMOL/L — SIGNIFICANT CHANGE UP (ref 5–17)
AST SERPL-CCNC: 13 U/L — SIGNIFICANT CHANGE UP (ref 10–40)
BASOPHILS # BLD AUTO: 0.08 K/UL — SIGNIFICANT CHANGE UP (ref 0–0.2)
BASOPHILS NFR BLD AUTO: 1.8 % — SIGNIFICANT CHANGE UP (ref 0–2)
BILIRUB SERPL-MCNC: 0.2 MG/DL — SIGNIFICANT CHANGE UP (ref 0.2–1.2)
BUN SERPL-MCNC: 22 MG/DL — SIGNIFICANT CHANGE UP (ref 7–23)
CALCIUM SERPL-MCNC: 9.8 MG/DL — SIGNIFICANT CHANGE UP (ref 8.4–10.5)
CHLORIDE SERPL-SCNC: 102 MMOL/L — SIGNIFICANT CHANGE UP (ref 96–108)
CO2 SERPL-SCNC: 25 MMOL/L — SIGNIFICANT CHANGE UP (ref 22–31)
CREAT SERPL-MCNC: 0.87 MG/DL — SIGNIFICANT CHANGE UP (ref 0.5–1.3)
CULTURE RESULTS: SIGNIFICANT CHANGE UP
EGFR: 95 ML/MIN/1.73M2 — SIGNIFICANT CHANGE UP
EGFR: 95 ML/MIN/1.73M2 — SIGNIFICANT CHANGE UP
EOSINOPHIL # BLD AUTO: 0.15 K/UL — SIGNIFICANT CHANGE UP (ref 0–0.5)
FUNGITELL: 308 PG/ML — HIGH
GLUCOSE BLDC GLUCOMTR-MCNC: 230 MG/DL — HIGH (ref 70–99)
GLUCOSE BLDC GLUCOMTR-MCNC: 275 MG/DL — HIGH (ref 70–99)
GLUCOSE BLDC GLUCOMTR-MCNC: 287 MG/DL — HIGH (ref 70–99)
GLUCOSE SERPL-MCNC: 210 MG/DL — HIGH (ref 70–99)
HCT VFR BLD CALC: 28.1 % — LOW (ref 39–50)
HGB BLD-MCNC: 9.1 G/DL — LOW (ref 13–17)
IMM GRANULOCYTES NFR BLD AUTO: 3.5 % — HIGH (ref 0–0.9)
IRON SATN MFR SERPL: 12 % — LOW (ref 16–55)
IRON SATN MFR SERPL: 23 UG/DL — LOW (ref 45–165)
LYMPHOCYTES # BLD AUTO: 0.56 K/UL — LOW (ref 1–3.3)
LYMPHOCYTES # BLD AUTO: 12.3 % — LOW (ref 13–44)
MAGNESIUM SERPL-MCNC: 1.6 MG/DL — SIGNIFICANT CHANGE UP (ref 1.6–2.6)
MCHC RBC-ENTMCNC: 31.8 PG — SIGNIFICANT CHANGE UP (ref 27–34)
MCHC RBC-ENTMCNC: 32.4 G/DL — SIGNIFICANT CHANGE UP (ref 32–36)
MCV RBC AUTO: 98.3 FL — SIGNIFICANT CHANGE UP (ref 80–100)
MONOCYTES # BLD AUTO: 0.38 K/UL — SIGNIFICANT CHANGE UP (ref 0–0.9)
MONOCYTES NFR BLD AUTO: 8.4 % — SIGNIFICANT CHANGE UP (ref 2–14)
NEUTROPHILS # BLD AUTO: 3.21 K/UL — SIGNIFICANT CHANGE UP (ref 1.8–7.4)
NEUTROPHILS NFR BLD AUTO: 70.7 % — SIGNIFICANT CHANGE UP (ref 43–77)
NRBC BLD AUTO-RTO: 0 /100 WBCS — SIGNIFICANT CHANGE UP (ref 0–0)
PHOSPHATE SERPL-MCNC: 2.6 MG/DL — SIGNIFICANT CHANGE UP (ref 2.5–4.5)
PLATELET # BLD AUTO: 237 K/UL — SIGNIFICANT CHANGE UP (ref 150–400)
POTASSIUM SERPL-MCNC: 4.5 MMOL/L — SIGNIFICANT CHANGE UP (ref 3.5–5.3)
POTASSIUM SERPL-SCNC: 4.5 MMOL/L — SIGNIFICANT CHANGE UP (ref 3.5–5.3)
PROT SERPL-MCNC: 6.1 G/DL — SIGNIFICANT CHANGE UP (ref 6–8.3)
RBC # BLD: 2.86 M/UL — LOW (ref 4.2–5.8)
RBC # FLD: 16.2 % — HIGH (ref 10.3–14.5)
SODIUM SERPL-SCNC: 137 MMOL/L — SIGNIFICANT CHANGE UP (ref 135–145)
SPECIMEN SOURCE: SIGNIFICANT CHANGE UP
TACROLIMUS SERPL-MCNC: 3.1 NG/ML — SIGNIFICANT CHANGE UP
TIBC SERPL-MCNC: 192 UG/DL — LOW (ref 220–430)
UIBC SERPL-MCNC: 169 UG/DL — SIGNIFICANT CHANGE UP (ref 110–370)
URATE SERPL-MCNC: 5.9 MG/DL — SIGNIFICANT CHANGE UP (ref 3.4–8.8)
WBC # BLD: 4.54 K/UL — SIGNIFICANT CHANGE UP (ref 3.8–10.5)
WBC # FLD AUTO: 4.54 K/UL — SIGNIFICANT CHANGE UP (ref 3.8–10.5)

## 2025-03-21 PROCEDURE — 83735 ASSAY OF MAGNESIUM: CPT

## 2025-03-21 PROCEDURE — 83615 LACTATE (LD) (LDH) ENZYME: CPT

## 2025-03-21 PROCEDURE — 97530 THERAPEUTIC ACTIVITIES: CPT

## 2025-03-21 PROCEDURE — 99285 EMERGENCY DEPT VISIT HI MDM: CPT

## 2025-03-21 PROCEDURE — 85025 COMPLETE CBC W/AUTO DIFF WBC: CPT

## 2025-03-21 PROCEDURE — 87640 STAPH A DNA AMP PROBE: CPT

## 2025-03-21 PROCEDURE — 87086 URINE CULTURE/COLONY COUNT: CPT

## 2025-03-21 PROCEDURE — 99239 HOSP IP/OBS DSCHRG MGMT >30: CPT

## 2025-03-21 PROCEDURE — 87529 HSV DNA AMP PROBE: CPT

## 2025-03-21 PROCEDURE — 83550 IRON BINDING TEST: CPT

## 2025-03-21 PROCEDURE — 96374 THER/PROPH/DIAG INJ IV PUSH: CPT

## 2025-03-21 PROCEDURE — 87305 ASPERGILLUS AG IA: CPT

## 2025-03-21 PROCEDURE — 99232 SBSQ HOSP IP/OBS MODERATE 35: CPT

## 2025-03-21 PROCEDURE — 83690 ASSAY OF LIPASE: CPT

## 2025-03-21 PROCEDURE — 87507 IADNA-DNA/RNA PROBE TQ 12-25: CPT

## 2025-03-21 PROCEDURE — 82784 ASSAY IGA/IGD/IGG/IGM EACH: CPT

## 2025-03-21 PROCEDURE — 86334 IMMUNOFIX E-PHORESIS SERUM: CPT

## 2025-03-21 PROCEDURE — 84295 ASSAY OF SERUM SODIUM: CPT

## 2025-03-21 PROCEDURE — 83036 HEMOGLOBIN GLYCOSYLATED A1C: CPT

## 2025-03-21 PROCEDURE — 85384 FIBRINOGEN ACTIVITY: CPT

## 2025-03-21 PROCEDURE — 87799 DETECT AGENT NOS DNA QUANT: CPT

## 2025-03-21 PROCEDURE — 71045 X-RAY EXAM CHEST 1 VIEW: CPT

## 2025-03-21 PROCEDURE — 80202 ASSAY OF VANCOMYCIN: CPT

## 2025-03-21 PROCEDURE — 87637 SARSCOV2&INF A&B&RSV AMP PRB: CPT

## 2025-03-21 PROCEDURE — 82550 ASSAY OF CK (CPK): CPT

## 2025-03-21 PROCEDURE — 85027 COMPLETE CBC AUTOMATED: CPT

## 2025-03-21 PROCEDURE — 81001 URINALYSIS AUTO W/SCOPE: CPT

## 2025-03-21 PROCEDURE — 87150 DNA/RNA AMPLIFIED PROBE: CPT

## 2025-03-21 PROCEDURE — 87186 SC STD MICRODIL/AGAR DIL: CPT

## 2025-03-21 PROCEDURE — 82947 ASSAY GLUCOSE BLOOD QUANT: CPT

## 2025-03-21 PROCEDURE — 83540 ASSAY OF IRON: CPT

## 2025-03-21 PROCEDURE — 82330 ASSAY OF CALCIUM: CPT

## 2025-03-21 PROCEDURE — 72195 MRI PELVIS W/O DYE: CPT | Mod: MC

## 2025-03-21 PROCEDURE — 85610 PROTHROMBIN TIME: CPT

## 2025-03-21 PROCEDURE — 82435 ASSAY OF BLOOD CHLORIDE: CPT

## 2025-03-21 PROCEDURE — 82962 GLUCOSE BLOOD TEST: CPT

## 2025-03-21 PROCEDURE — 87641 MR-STAPH DNA AMP PROBE: CPT

## 2025-03-21 PROCEDURE — 80053 COMPREHEN METABOLIC PANEL: CPT

## 2025-03-21 PROCEDURE — 71250 CT THORAX DX C-: CPT | Mod: MC

## 2025-03-21 PROCEDURE — 82803 BLOOD GASES ANY COMBINATION: CPT

## 2025-03-21 PROCEDURE — 97162 PT EVAL MOD COMPLEX 30 MIN: CPT

## 2025-03-21 PROCEDURE — 87077 CULTURE AEROBIC IDENTIFY: CPT

## 2025-03-21 PROCEDURE — 85018 HEMOGLOBIN: CPT

## 2025-03-21 PROCEDURE — 87449 NOS EACH ORGANISM AG IA: CPT

## 2025-03-21 PROCEDURE — 96375 TX/PRO/DX INJ NEW DRUG ADDON: CPT

## 2025-03-21 PROCEDURE — 80048 BASIC METABOLIC PNL TOTAL CA: CPT

## 2025-03-21 PROCEDURE — 87040 BLOOD CULTURE FOR BACTERIA: CPT

## 2025-03-21 PROCEDURE — 84132 ASSAY OF SERUM POTASSIUM: CPT

## 2025-03-21 PROCEDURE — 84443 ASSAY THYROID STIM HORMONE: CPT

## 2025-03-21 PROCEDURE — 93306 TTE W/DOPPLER COMPLETE: CPT

## 2025-03-21 PROCEDURE — 70450 CT HEAD/BRAIN W/O DYE: CPT | Mod: MC

## 2025-03-21 PROCEDURE — 84100 ASSAY OF PHOSPHORUS: CPT

## 2025-03-21 PROCEDURE — 85014 HEMATOCRIT: CPT

## 2025-03-21 PROCEDURE — 97116 GAIT TRAINING THERAPY: CPT

## 2025-03-21 PROCEDURE — 83605 ASSAY OF LACTIC ACID: CPT

## 2025-03-21 PROCEDURE — 84550 ASSAY OF BLOOD/URIC ACID: CPT

## 2025-03-21 PROCEDURE — 74177 CT ABD & PELVIS W/CONTRAST: CPT | Mod: MC

## 2025-03-21 PROCEDURE — 87324 CLOSTRIDIUM AG IA: CPT

## 2025-03-21 PROCEDURE — 82787 IGG 1 2 3 OR 4 EACH: CPT

## 2025-03-21 PROCEDURE — 36415 COLL VENOUS BLD VENIPUNCTURE: CPT

## 2025-03-21 PROCEDURE — 97110 THERAPEUTIC EXERCISES: CPT

## 2025-03-21 PROCEDURE — 85730 THROMBOPLASTIN TIME PARTIAL: CPT

## 2025-03-21 PROCEDURE — 82728 ASSAY OF FERRITIN: CPT

## 2025-03-21 PROCEDURE — 72125 CT NECK SPINE W/O DYE: CPT | Mod: MC

## 2025-03-21 PROCEDURE — 80197 ASSAY OF TACROLIMUS: CPT

## 2025-03-21 RX ORDER — INSULIN GLARGINE-YFGN 100 [IU]/ML
24 INJECTION, SOLUTION SUBCUTANEOUS
Qty: 0 | Refills: 0 | DISCHARGE
Start: 2025-03-21

## 2025-03-21 RX ORDER — CARVEDILOL 3.12 MG/1
12.5 TABLET, FILM COATED ORAL EVERY 12 HOURS
Refills: 0 | Status: DISCONTINUED | OUTPATIENT
Start: 2025-03-21 | End: 2025-03-21

## 2025-03-21 RX ORDER — INSULIN GLARGINE-YFGN 100 [IU]/ML
10 INJECTION, SOLUTION SUBCUTANEOUS
Refills: 0 | DISCHARGE

## 2025-03-21 RX ORDER — QUETIAPINE FUMARATE 25 MG/1
1 TABLET ORAL
Qty: 0 | Refills: 0 | DISCHARGE
Start: 2025-03-21

## 2025-03-21 RX ORDER — ACETAMINOPHEN 500 MG/5ML
2 LIQUID (ML) ORAL
Refills: 0 | DISCHARGE

## 2025-03-21 RX ORDER — LEVOTHYROXINE SODIUM 300 MCG
1 TABLET ORAL
Qty: 0 | Refills: 0 | DISCHARGE
Start: 2025-03-21

## 2025-03-21 RX ORDER — INSULIN LISPRO 100 U/ML
24 INJECTION, SOLUTION INTRAVENOUS; SUBCUTANEOUS
Refills: 0 | Status: DISCONTINUED | OUTPATIENT
Start: 2025-03-21 | End: 2025-03-21

## 2025-03-21 RX ORDER — INSULIN GLARGINE-YFGN 100 [IU]/ML
28 INJECTION, SOLUTION SUBCUTANEOUS
Qty: 0 | Refills: 0 | DISCHARGE
Start: 2025-03-21

## 2025-03-21 RX ORDER — INSULIN LISPRO 100 U/ML
24 INJECTION, SOLUTION INTRAVENOUS; SUBCUTANEOUS
Qty: 0 | Refills: 0 | DISCHARGE
Start: 2025-03-21

## 2025-03-21 RX ORDER — SENNA 187 MG
2 TABLET ORAL
Qty: 0 | Refills: 0 | DISCHARGE
Start: 2025-03-21

## 2025-03-21 RX ORDER — POLYETHYLENE GLYCOL 3350 17 G/17G
17 POWDER, FOR SOLUTION ORAL
Qty: 0 | Refills: 0 | DISCHARGE
Start: 2025-03-21

## 2025-03-21 RX ORDER — PREDNISONE 20 MG/1
1 TABLET ORAL
Qty: 0 | Refills: 0 | DISCHARGE
Start: 2025-03-21

## 2025-03-21 RX ORDER — LENALIDOMIDE 10 MG/1
1 CAPSULE ORAL
Qty: 0 | Refills: 0 | DISCHARGE
Start: 2025-03-21

## 2025-03-21 RX ORDER — INSULIN GLARGINE-YFGN 100 [IU]/ML
28 INJECTION, SOLUTION SUBCUTANEOUS AT BEDTIME
Refills: 0 | Status: DISCONTINUED | OUTPATIENT
Start: 2025-03-21 | End: 2025-03-21

## 2025-03-21 RX ORDER — DEXTROSE 50 % IN WATER 50 %
1 SYRINGE (ML) INTRAVENOUS
Refills: 0 | DISCHARGE

## 2025-03-21 RX ORDER — TACROLIMUS 0.5 MG/1
3 CAPSULE ORAL
Qty: 0 | Refills: 0 | DISCHARGE
Start: 2025-03-21

## 2025-03-21 RX ORDER — SULFAMETHOXAZOLE/TRIMETHOPRIM 800-160 MG
1 TABLET ORAL
Qty: 0 | Refills: 0 | DISCHARGE
Start: 2025-03-21

## 2025-03-21 RX ADMIN — PREDNISONE 5 MILLIGRAM(S): 20 TABLET ORAL at 06:07

## 2025-03-21 RX ADMIN — Medication 500 MILLIGRAM(S): at 06:07

## 2025-03-21 RX ADMIN — Medication 81 MILLIGRAM(S): at 12:05

## 2025-03-21 RX ADMIN — TACROLIMUS 1.5 MILLIGRAM(S): 0.5 CAPSULE ORAL at 08:36

## 2025-03-21 RX ADMIN — Medication 100 MILLIGRAM(S): at 13:12

## 2025-03-21 RX ADMIN — POLYETHYLENE GLYCOL 3350 17 GRAM(S): 17 POWDER, FOR SOLUTION ORAL at 17:01

## 2025-03-21 RX ADMIN — INSULIN LISPRO 6: 100 INJECTION, SOLUTION INTRAVENOUS; SUBCUTANEOUS at 08:35

## 2025-03-21 RX ADMIN — Medication 1 TABLET(S): at 12:05

## 2025-03-21 RX ADMIN — ESCITALOPRAM OXALATE 10 MILLIGRAM(S): 20 TABLET ORAL at 12:06

## 2025-03-21 RX ADMIN — BUPROPION HYDROBROMIDE 300 MILLIGRAM(S): 522 TABLET, EXTENDED RELEASE ORAL at 12:05

## 2025-03-21 RX ADMIN — INSULIN LISPRO 20 UNIT(S): 100 INJECTION, SOLUTION INTRAVENOUS; SUBCUTANEOUS at 12:06

## 2025-03-21 RX ADMIN — INSULIN LISPRO 24 UNIT(S): 100 INJECTION, SOLUTION INTRAVENOUS; SUBCUTANEOUS at 17:00

## 2025-03-21 RX ADMIN — INSULIN LISPRO 6: 100 INJECTION, SOLUTION INTRAVENOUS; SUBCUTANEOUS at 12:05

## 2025-03-21 RX ADMIN — Medication 0.1 MILLIGRAM(S): at 06:07

## 2025-03-21 RX ADMIN — Medication 250 MILLIGRAM(S): at 06:07

## 2025-03-21 RX ADMIN — Medication 0.1 MILLIGRAM(S): at 13:12

## 2025-03-21 RX ADMIN — Medication 500 MILLIGRAM(S): at 17:01

## 2025-03-21 RX ADMIN — Medication 100 MILLIGRAM(S): at 06:07

## 2025-03-21 RX ADMIN — CARVEDILOL 12.5 MILLIGRAM(S): 3.12 TABLET, FILM COATED ORAL at 17:01

## 2025-03-21 RX ADMIN — INSULIN LISPRO 4: 100 INJECTION, SOLUTION INTRAVENOUS; SUBCUTANEOUS at 17:00

## 2025-03-21 RX ADMIN — Medication 40 MILLIGRAM(S): at 06:07

## 2025-03-21 RX ADMIN — ENOXAPARIN SODIUM 40 MILLIGRAM(S): 100 INJECTION SUBCUTANEOUS at 12:06

## 2025-03-21 RX ADMIN — Medication 88 MICROGRAM(S): at 06:07

## 2025-03-21 RX ADMIN — INSULIN LISPRO 20 UNIT(S): 100 INJECTION, SOLUTION INTRAVENOUS; SUBCUTANEOUS at 08:36

## 2025-03-21 NOTE — PROGRESS NOTE ADULT - PROBLEM SELECTOR PLAN 1
Inpatient Plan:  - Check BG TID AC, HS, and 2AM while on PO diet   - Adjust Lantus to 28 units QHS  - Adjust Admelog to 24 units TID AC (HOLD if NPO)  - C/w moderate dose Admelog correctional scale TID AC   - C/w low dose Admelog correctional scale HS and 2AM    Discharge Plan:  - Basal/bolus, doses TBD closer to d/c  - Patient should check BG TID AC and HS at home/rehab. Please tell patient/RNs to contact Endocrinologist or provider at rehab if BG <70mg/dL x1 or >200mg/dL consistently or >400mg/dL x1.   - Endocrine follow up: can f/u with Nick Moore or can establish care at [address] [place] [address] ([phone])- please provide in d/c paperwork for patient to make appt once d/c from rehab.   - Recommend routine outpatient ophthalmology and podiatry follow up.  - Please write Rxs for: Solo Star Insulin pen/Humalog Kwik insulin pen ( can prescribe any insulin analog equivalent covered by pt's insurance)/ Maria E insulin pen needles/glucose meter/strips/lancets. Glucose tablets/gel> use as directed plus Glucagon nasal/ IM injection (use as directed)>Can prescribe any covered by pt's insurance.

## 2025-03-21 NOTE — PROGRESS NOTE ADULT - PROBLEM SELECTOR PLAN 1
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   - [place] stage IV as pt has extranodal involvement  -CT A/P on admission with response above and below the diaphragm, effectively in CR1  Treatment hx:  	-12/29/2024: R-miniCHOP x1 with course c/b severe PJP infection so cycle 2 not given   	-2/13/2025: Rituximab/polatuzumab/Revlimid (10mg days 1-14) on a 21-day schedule. C1D1 2/13/25. C2D1 	2/27/25. C3D1 3/20/25.  -Today 3/21/25 is C3D2 [id number]. 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

## 2025-03-21 NOTE — DISCHARGE NOTE NURSING/CASE MANAGEMENT/SOCIAL WORK - NSDCVIVACCINE_GEN_ALL_CORE_FT
Td (adult) preservative free; 24-Mar-2017 19:43; Bharat Bolivar (NURA); [employer]; [id number]; IntraMuscular; Deltoid Right.; 0.5 milliLiter(s); VIS (VIS Published: 24-Mar-2017, VIS Presented: 24-Mar-2017);   Tdap; 07-Oct-2016 17:16; Bharat Bolivar (NURA); [employer]; [id number]; IntraMuscular; Deltoid Right.; 0.5 milliLiter(s); VIS (VIS Published: 09-May-2013, VIS Presented: 07-Oct-2016);

## 2025-03-21 NOTE — PROGRESS NOTE ADULT - NS ATTEND AMEND GEN_ALL_CORE FT
Primary: [place]    Vital Signs Last 24 Hrs  T(C): 36.6 (20 Mar 2025 00:44), Max: 36.8 (19 Mar 2025 17:01)  T(F): 97.9 (20 Mar 2025 00:44), Max: 98.2 (19 Mar 2025 17:01)  HR: 69 (20 Mar 2025 00:44) (68 - 71)  BP: 147/69 (20 Mar 2025 00:44) (147/69 - 158/76)  BP(mean): --  RR: 18 (20 Mar 2025 00:44) (18 - 18)  SpO2: 98% (20 Mar 2025 00:44) (97% - 98%)    Parameters below as of 20 Mar 2025 00:44  Patient On (Oxygen Delivery Method): room air    MEDICATIONS  (STANDING):  aspirin enteric coated 81 milliGRAM(s) Oral daily  buPROPion XL (24-Hour) . 300 milliGRAM(s) Oral daily  chlorhexidine 2% Cloths 1 Application(s) Topical <User Schedule>  cloNIDine 0.1 milliGRAM(s) Oral three times a day  dextrose 5%. 1000 milliLiter(s) (100 mL/Hr) IV Continuous <Continuous>  dextrose 5%. 1000 milliLiter(s) (50 mL/Hr) IV Continuous <Continuous>  dextrose 50% Injectable 25 Gram(s) IV Push once  dextrose 50% Injectable 12.5 Gram(s) IV Push once  dextrose 50% Injectable 25 Gram(s) IV Push once  enoxaparin Injectable 40 milliGRAM(s) SubCutaneous every 24 hours  escitalopram 10 milliGRAM(s) Oral daily  glucagon  Injectable 1 milliGRAM(s) IntraMuscular once  hydrALAZINE 100 milliGRAM(s) Oral every 8 hours  insulin glargine Injectable (LANTUS) 20 Unit(s) SubCutaneous at bedtime  insulin lispro (ADMELOG) corrective regimen sliding scale   SubCutaneous three times a day before meals  insulin lispro (ADMELOG) corrective regimen sliding scale   SubCutaneous at bedtime  insulin lispro Injectable (ADMELOG) 16 Unit(s) SubCutaneous three times a day before meals  lactated ringers. 1000 milliLiter(s) (75 mL/Hr) IV Continuous <Continuous>  levothyroxine 88 MICROGram(s) Oral daily  melatonin 3 milliGRAM(s) Oral at bedtime  pantoprazole    Tablet 40 milliGRAM(s) Oral before breakfast  polyethylene glycol 3350 17 Gram(s) Oral two times a day  predniSONE   Tablet 5 milliGRAM(s) Oral daily  QUEtiapine 25 milliGRAM(s) Oral at bedtime  rosuvastatin 10 milliGRAM(s) Oral at bedtime  senna 2 Tablet(s) Oral at bedtime  tacrolimus 1.5 milliGRAM(s) Oral two times a day  trimethoprim  160 mG/sulfamethoxazole 800 mG 1 Tablet(s) Oral daily  valACYclovir 500 milliGRAM(s) Oral every 12 hours  vancomycin  IVPB 1000 milliGRAM(s) IV Intermittent every 12 hours      Assessment: 69-year-old day 2 cycle 3 Rituximab/polatuzumab for post renal transplant, advanced stage PTLD.   Course complicated by severe PCP infection on day 1 of cycle 2 miniRCHOP (and cycle 2 was not given); currently complicated by bacteremia (3/13/25) Staph hominis+staph haemolyticus and Gi PCr + for Campylobacter.     Onc History: [facility] x1 (12/17/24)    PMHx:  1) renal transplant 2012: H/O left nephrectomy; renal transplant was secondary to DM  2) AODM  3) HLD  4) hypothyroidism  5) peripheral neuropathy  6) retroperitoneal fibrosis  7) GERD  8) HTN  9) anxiety/depression  10: obstructive sleep apnea  11) osteomyelitis and E coli urosepsis (12/1/24 - 12/18/24)  12) PCP (severe)    12/16/24: Liver, right, mass: - Diffuse large B-cell lymphoma, germinal center B-cell subtype with high proliferation index, SWATHI-negative, Posttransplant (kidney).    Plan:  Heme:   Continue Rev 10mg daily for days 1- 14.   ASA secondary to Revlimid   Scot has recevied iron sucrose for a low iron saturation (this is from Hepcidin); D/C at this time  Repeat iron studies are pending    Radiology: MRI [facility] (3/18/25): Comparison is made to prior MRI the pelvis dated 12/02/2024. There has been no significant interval change in the extent of marrow edema centered on the sacrum and left iliac bone, though evaluation is limited by extensive motion artifact on the MRI. Patchy signal in the right iliac bone adjacent to the right sacroiliac joint is slightly more prominent though limited by the motion artifact on prior exam.    CT chest (3/17/25): no adenopathy, CT Abd (3/13/25): near resolution of hepatic (lymphoma) lesion    ID: valtrex/vanco/bactrim, IVIg (0.4grams/kg) infused 3/20/25   Discuss with ID what medications can be given in his rehab center.     HTN: please see my last note with is home medications - try to return to baseline antihypertensives      Renal transplant: pred and tacro    DVT prophylaxis: LWHx.    Over 60 minutes were spent in direct patient, non-resident teaching, care and care coordination today. .  Primary: [place]    Vital Signs Last 24 Hrs  T(C): 36.6 (20 Mar 2025 00:44), Max: 36.8 (19 Mar 2025 17:01)  T(F): 97.9 (20 Mar 2025 00:44), Max: 98.2 (19 Mar 2025 17:01)  HR: 69 (20 Mar 2025 00:44) (68 - 71)  BP: 147/69 (20 Mar 2025 00:44) (147/69 - 158/76)  BP(mean): --  RR: 18 (20 Mar 2025 00:44) (18 - 18)  SpO2: 98% (20 Mar 2025 00:44) (97% - 98%)    Parameters below as of 20 Mar 2025 00:44  Patient On (Oxygen Delivery Method): room air    MEDICATIONS  (STANDING):  aspirin enteric coated 81 milliGRAM(s) Oral daily  buPROPion XL (24-Hour) . 300 milliGRAM(s) Oral daily  chlorhexidine 2% Cloths 1 Application(s) Topical <User Schedule>  cloNIDine 0.1 milliGRAM(s) Oral three times a day  dextrose 5%. 1000 milliLiter(s) (100 mL/Hr) IV Continuous <Continuous>  dextrose 5%. 1000 milliLiter(s) (50 mL/Hr) IV Continuous <Continuous>  dextrose 50% Injectable 25 Gram(s) IV Push once  dextrose 50% Injectable 12.5 Gram(s) IV Push once  dextrose 50% Injectable 25 Gram(s) IV Push once  enoxaparin Injectable 40 milliGRAM(s) SubCutaneous every 24 hours  escitalopram 10 milliGRAM(s) Oral daily  glucagon  Injectable 1 milliGRAM(s) IntraMuscular once  hydrALAZINE 100 milliGRAM(s) Oral every 8 hours  insulin glargine Injectable (LANTUS) 20 Unit(s) SubCutaneous at bedtime  insulin lispro (ADMELOG) corrective regimen sliding scale   SubCutaneous three times a day before meals  insulin lispro (ADMELOG) corrective regimen sliding scale   SubCutaneous at bedtime  insulin lispro Injectable (ADMELOG) 16 Unit(s) SubCutaneous three times a day before meals  lactated ringers. 1000 milliLiter(s) (75 mL/Hr) IV Continuous <Continuous>  levothyroxine 88 MICROGram(s) Oral daily  melatonin 3 milliGRAM(s) Oral at bedtime  pantoprazole    Tablet 40 milliGRAM(s) Oral before breakfast  polyethylene glycol 3350 17 Gram(s) Oral two times a day  predniSONE   Tablet 5 milliGRAM(s) Oral daily  QUEtiapine 25 milliGRAM(s) Oral at bedtime  rosuvastatin 10 milliGRAM(s) Oral at bedtime  senna 2 Tablet(s) Oral at bedtime  tacrolimus 1.5 milliGRAM(s) Oral two times a day  trimethoprim  160 mG/sulfamethoxazole 800 mG 1 Tablet(s) Oral daily  valACYclovir 500 milliGRAM(s) Oral every 12 hours  vancomycin  IVPB 1000 milliGRAM(s) IV Intermittent every 12 hours      Assessment: 69-year-old day 2 cycle 3 Revlimid/Rituximab/polatuzumab for post renal transplant, advanced stage PTLD.   Course complicated by severe PCP infection on day 1 of cycle 2 miniRCHOP (and cycle 2 was not given); currently complicated by bacteremia (3/13/25) Staph hominis+staph haemolyticus and Gi PCr + for Campylobacter.     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    Plan:  Heme:   Continue Rev 10mg daily for days 1- 14.   ASA secondary to Revlimid   Scot has recevied iron sucrose for a low iron saturation (this is from Hepcidin); D/C at this time  Repeat iron studies are pending    Radiology: MRI [facility] (3/18/25): Comparison is made to prior MRI the pelvis dated 12/02/2024. There has been no significant interval change in the extent of marrow edema centered on the sacrum and left iliac bone, though evaluation is limited by extensive motion artifact on the MRI. Patchy signal in the right iliac bone adjacent to the right sacroiliac joint is slightly more prominent though limited by the motion artifact on prior exam.    CT chest (3/17/25): no adenopathy, CT Abd (3/13/25): near resolution of hepatic (lymphoma) lesion    ID: valtrex/vanco/bactrim, IVIg (0.4grams/kg) infused 3/20/25   Discuss with ID what medications can be given in his rehab center.     HTN: please see my last note with is home medications - try to return to baseline antihypertensives      Renal transplant: pred and tacro    DVT prophylaxis: LWHx.    Over 60 minutes were spent in direct patient, non-resident teaching, care and care coordination today. .  Primary: [place]    Vital Signs Last 24 Hrs  T(C): 36.6 (20 Mar 2025 00:44), Max: 36.8 (19 Mar 2025 17:01)  T(F): 97.9 (20 Mar 2025 00:44), Max: 98.2 (19 Mar 2025 17:01)  HR: 69 (20 Mar 2025 00:44) (68 - 71)  BP: 147/69 (20 Mar 2025 00:44) (147/69 - 158/76)  BP(mean): --  RR: 18 (20 Mar 2025 00:44) (18 - 18)  SpO2: 98% (20 Mar 2025 00:44) (97% - 98%)    Parameters below as of 20 Mar 2025 00:44  Patient On (Oxygen Delivery Method): room air    MEDICATIONS  (STANDING):  aspirin enteric coated 81 milliGRAM(s) Oral daily  buPROPion XL (24-Hour) . 300 milliGRAM(s) Oral daily  chlorhexidine 2% Cloths 1 Application(s) Topical <User Schedule>  cloNIDine 0.1 milliGRAM(s) Oral three times a day  dextrose 5%. 1000 milliLiter(s) (100 mL/Hr) IV Continuous <Continuous>  dextrose 5%. 1000 milliLiter(s) (50 mL/Hr) IV Continuous <Continuous>  dextrose 50% Injectable 25 Gram(s) IV Push once  dextrose 50% Injectable 12.5 Gram(s) IV Push once  dextrose 50% Injectable 25 Gram(s) IV Push once  enoxaparin Injectable 40 milliGRAM(s) SubCutaneous every 24 hours  escitalopram 10 milliGRAM(s) Oral daily  glucagon  Injectable 1 milliGRAM(s) IntraMuscular once  hydrALAZINE 100 milliGRAM(s) Oral every 8 hours  insulin glargine Injectable (LANTUS) 20 Unit(s) SubCutaneous at bedtime  insulin lispro (ADMELOG) corrective regimen sliding scale   SubCutaneous three times a day before meals  insulin lispro (ADMELOG) corrective regimen sliding scale   SubCutaneous at bedtime  insulin lispro Injectable (ADMELOG) 16 Unit(s) SubCutaneous three times a day before meals  lactated ringers. 1000 milliLiter(s) (75 mL/Hr) IV Continuous <Continuous>  levothyroxine 88 MICROGram(s) Oral daily  melatonin 3 milliGRAM(s) Oral at bedtime  pantoprazole    Tablet 40 milliGRAM(s) Oral before breakfast  polyethylene glycol 3350 17 Gram(s) Oral two times a day  predniSONE   Tablet 5 milliGRAM(s) Oral daily  QUEtiapine 25 milliGRAM(s) Oral at bedtime  rosuvastatin 10 milliGRAM(s) Oral at bedtime  senna 2 Tablet(s) Oral at bedtime  tacrolimus 1.5 milliGRAM(s) Oral two times a day  trimethoprim  160 mG/sulfamethoxazole 800 mG 1 Tablet(s) Oral daily  valACYclovir 500 milliGRAM(s) Oral every 12 hours  vancomycin  IVPB 1000 milliGRAM(s) IV Intermittent every 12 hours      Assessment: 69-year-old day 2 cycle 3 Revlimid/Rituximab/polatuzumab for post renal transplant, advanced stage PTLD.   Course complicated by severe PCP infection on day 1 of cycle 2 miniRCHOP (and cycle 2 was not given); currently complicated by bacteremia (3/13/25) Staph hominis+staph haemolyticus and Gi PCr + for Campylobacter.     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    Plan:  Heme:   Continue Rev 10mg daily for days 1- 14.   ASA secondary to Revlimid   Scot has received iron sucrose for a low iron saturation (this is from Hepcidin); D/C at this time  Repeat iron studies are pending    Radiology: MRI [facility] (3/18/25): Comparison is made to prior MRI the pelvis dated 12/02/2024. There has been no significant interval change in the extent of marrow edema centered on the sacrum and left iliac bone, though evaluation is limited by extensive motion artifact on the MRI. Patchy signal in the right iliac bone adjacent to the right sacroiliac joint is slightly more prominent though limited by the motion artifact on prior exam.    CT chest (3/17/25): no adenopathy, CT Abd (3/13/25): near resolution of hepatic (lymphoma) lesion    ID: valtrex/vanco/bactrim, IVIg (0.4grams/kg) infused 3/20/25   D/C vanco    HTN: please see my last note with is home medications - try to return to baseline antihypertensives      Renal transplant: pred and tacro    DVT prophylaxis: LWHx.    Over 35 minutes were spent in direct patient, non-resident teaching, discharge management.

## 2025-03-21 NOTE — PROGRESS NOTE ADULT - SUBJECTIVE AND OBJECTIVE BOX
Chief Complaint: Diabetes Mellitus follow up    INTERVAL HX:  Patient seen at bedside.   Reports eating well, finishes 100% of food on each tray.   BG over the last 24 hrs have been hyperglycemic in the 200s.  Goal range 100-180mg/dl. No hypoglycemia.     Review of Systems:  General: As above  GI: No nausea, vomiting  Endocrine: no  S&Sx of hypoglycemia    Allergies    No Known Allergies    Intolerances      MEDICATIONS  (STANDING):  aspirin enteric coated 81 milliGRAM(s) Oral daily  buPROPion XL (24-Hour) . 300 milliGRAM(s) Oral daily  carvedilol 12.5 milliGRAM(s) Oral every 12 hours  chlorhexidine 2% Cloths 1 Application(s) Topical <User Schedule>  chlorhexidine 4% Liquid 1 Application(s) Topical <User Schedule>  cloNIDine 0.1 milliGRAM(s) Oral three times a day  dextrose 5%. 1000 milliLiter(s) (100 mL/Hr) IV Continuous <Continuous>  dextrose 5%. 1000 milliLiter(s) (50 mL/Hr) IV Continuous <Continuous>  dextrose 50% Injectable 25 Gram(s) IV Push once  dextrose 50% Injectable 12.5 Gram(s) IV Push once  dextrose 50% Injectable 25 Gram(s) IV Push once  enoxaparin Injectable 40 milliGRAM(s) SubCutaneous every 24 hours  escitalopram 10 milliGRAM(s) Oral daily  glucagon  Injectable 1 milliGRAM(s) IntraMuscular once  hydrALAZINE 100 milliGRAM(s) Oral every 8 hours  insulin glargine Injectable (LANTUS) 24 Unit(s) SubCutaneous at bedtime  insulin lispro (ADMELOG) corrective regimen sliding scale   SubCutaneous three times a day before meals  insulin lispro (ADMELOG) corrective regimen sliding scale   SubCutaneous at bedtime  insulin lispro Injectable (ADMELOG) 20 Unit(s) SubCutaneous three times a day before meals  lactated ringers. 1000 milliLiter(s) (75 mL/Hr) IV Continuous <Continuous>  lenalidomide 10 milliGRAM(s) Oral at bedtime  levothyroxine 88 MICROGram(s) Oral daily  melatonin 3 milliGRAM(s) Oral at bedtime  pantoprazole    Tablet 40 milliGRAM(s) Oral before breakfast  polyethylene glycol 3350 17 Gram(s) Oral two times a day  predniSONE   Tablet 5 milliGRAM(s) Oral daily  QUEtiapine 25 milliGRAM(s) Oral at bedtime  rosuvastatin 10 milliGRAM(s) Oral at bedtime  senna 2 Tablet(s) Oral at bedtime  tacrolimus 1.5 milliGRAM(s) Oral two times a day  trimethoprim  160 mG/sulfamethoxazole 800 mG 1 Tablet(s) Oral daily  valACYclovir 500 milliGRAM(s) Oral every 12 hours        insulin glargine Injectable (LANTUS)   24 Unit(s) SubCutaneous (03-20-25 @ 21:32)    insulin lispro (ADMELOG) corrective regimen sliding scale   6 Unit(s) SubCutaneous (03-21-25 @ 12:05)   6 Unit(s) SubCutaneous (03-21-25 @ 08:35)   6 Unit(s) SubCutaneous (03-20-25 @ 16:39)    insulin lispro Injectable (ADMELOG)   20 Unit(s) SubCutaneous (03-21-25 @ 12:06)   20 Unit(s) SubCutaneous (03-21-25 @ 08:36)   20 Unit(s) SubCutaneous (03-20-25 @ 16:39)    levothyroxine   88 MICROGram(s) Oral (03-21-25 @ 06:07)    predniSONE   Tablet   5 milliGRAM(s) Oral (03-21-25 @ 06:07)    rosuvastatin   10 milliGRAM(s) Oral (03-20-25 @ 21:33)        PHYSICAL EXAM:  VITALS: T(C): 37.1 (03-21-25 @ 12:29)  T(F): 98.7 (03-21-25 @ 12:29), Max: 98.7 (03-20-25 @ 18:30)  HR: 68 (03-21-25 @ 12:29) (64 - 74)  BP: 151/59 (03-21-25 @ 12:29) (140/64 - 167/67)  RR:  (16 - 18)  SpO2:  (95% - 98%)  Wt(kg): --  GENERAL: NAD  Respiratory: Respirations unlabored   Extremities: Warm, no edema  NEURO: Alert , appropriate     LABS:  POCT Blood Glucose.: 287 mg/dL (03-21-25 @ 11:44)  POCT Blood Glucose.: 275 mg/dL (03-21-25 @ 07:57)  POCT Blood Glucose.: 244 mg/dL (03-20-25 @ 21:12)  POCT Blood Glucose.: 273 mg/dL (03-20-25 @ 16:36)  POCT Blood Glucose.: 239 mg/dL (03-20-25 @ 11:55)  POCT Blood Glucose.: 324 mg/dL (03-20-25 @ 08:13)  POCT Blood Glucose.: 199 mg/dL (03-19-25 @ 21:12)  POCT Blood Glucose.: 217 mg/dL (03-19-25 @ 18:55)  POCT Blood Glucose.: 126 mg/dL (03-19-25 @ 16:49)  POCT Blood Glucose.: 187 mg/dL (03-19-25 @ 11:59)  POCT Blood Glucose.: 257 mg/dL (03-19-25 @ 08:15)  POCT Blood Glucose.: 182 mg/dL (03-19-25 @ 02:57)  POCT Blood Glucose.: 252 mg/dL (03-18-25 @ 21:53)  POCT Blood Glucose.: 309 mg/dL (03-18-25 @ 16:52)                          9.1    4.54  )-----------( 237      ( 21 Mar 2025 07:08 )             28.1     03-21    137  |  102  |  22  ----------------------------<  210[H]  4.5   |  25  |  0.87    Ca    9.8      21 Mar 2025 07:07  Phos  2.6     03-21  Mg     1.6     03-21    TPro  6.1  /  Alb  3.0[L]  /  TBili  0.2  /  DBili  x   /  AST  13  /  ALT  16  /  AlkPhos  82  03-21    eGFR: 95 mL/min/1.73m2 (21 Mar 2025 07:07)      Thyroid Function Tests:  03-19 @ 07:12 TSH 1.94 FreeT4 -- T3 -- Anti TPO -- Anti Thyroglobulin Ab -- TSI --  03-13 @ 15:26 TSH 4.47 FreeT4 -- T3 -- Anti TPO -- Anti Thyroglobulin Ab -- TSI --      A1C with Estimated Average Glucose Result: 10.1 % (03-14-25 @ 07:08)  A1C with Estimated Average Glucose Result: 9.6 % (03-13-25 @ 15:26)  A1C with Estimated Average Glucose Result: 8.4 % (01-28-25 @ 07:38)  A1C with Estimated Average Glucose Result: 7.8 % (12-29-24 @ 07:07)  A1C with Estimated Average Glucose Result: 7.7 % (12-28-24 @ 10:06)    Estimated Average Glucose: 243 mg/dL (03-14-25 @ 07:08)  Estimated Average Glucose: 229 mg/dL (03-13-25 @ 15:26)  Estimated Average Glucose: 194 mg/dL (01-28-25 @ 07:38)  Estimated Average Glucose: 177 mg/dL (12-29-24 @ 07:07)  Estimated Average Glucose: 174 mg/dL (12-28-24 @ 10:06)        Diet, Consistent Carbohydrate w/Evening Snack (03-14-25 @ 11:41) [Active]

## 2025-03-21 NOTE — ADVANCED PRACTICE NURSE CONSULT - RECOMMEDATIONS
Impression:    B/L buttocks/sacral stage 2 pressure injury, present on admission  incontinence associated dermatitis/moisture associated dermatitis  urinary incontinence   fecal incontinence    Recommendations:    1) turn and position q2 and PRN utilizing offloading assistive devices  2) routine pericare daily and PRN soiling  3) encourage optimal nutrition  4) waffle cushion when oob to chair  5) B/L LE complete cair air fluidized boots or shakira-lock pillow to offload heels/feet  6) triad protective barrier cream to B/L buttocks/sacrum daily and PRN soiling  7) incontinence management - consider external urinary catheter to divert urine from skin if incontinent  8) sween 24 moisturizer to dry skin daily     Plan discussed with NURA Arredondo      For questions/comments regarding the recommendations in this consult, please contact Perlita Pressley via [employer] Teams. Wound care will not actively follow. For new concerns, please enter new consult. Thank you!

## 2025-03-21 NOTE — DISCHARGE NOTE PROVIDER - DETAILS OF MALNUTRITION DIAGNOSIS/DIAGNOSES
This patient has been assessed with a concern for Malnutrition and was treated during this hospitalization for the following Nutrition diagnosis/diagnoses:     -  03/16/2025: Severe protein-calorie malnutrition

## 2025-03-21 NOTE — DISCHARGE NOTE PROVIDER - CARE PROVIDER_API CALL
Kervin Garcia  Medical Oncology  [address] [place] [id number]  Phone: [phone]  Fax: [phone]  Follow Up Time:

## 2025-03-21 NOTE — PROGRESS NOTE ADULT - NUTRITIONAL ASSESSMENT
This patient has been assessed with a concern for Malnutrition and has been determined to have a diagnosis/diagnoses of Severe protein-calorie malnutrition.    This patient is being managed with:   Diet Consistent Carbohydrate w/Evening Snack-  Entered: Mar 14 2025 11:30AM  
This patient has been assessed with a concern for Malnutrition and has been determined to have a diagnosis/diagnoses of Severe protein-calorie malnutrition.    This patient is being managed with:   Diet Consistent Carbohydrate w/Evening Snack-  Entered: Mar 14 2025 11:30AM  
Diet, Consistent Carbohydrate w/Evening Snack (03-14-25 @ 11:41) [Active]
This patient has been assessed with a concern for Malnutrition and has been determined to have a diagnosis/diagnoses of Severe protein-calorie malnutrition.    This patient is being managed with:   Diet Consistent Carbohydrate w/Evening Snack-  Entered: Mar 14 2025 11:30AM  
This patient has been assessed with a concern for Malnutrition and has been determined to have a diagnosis/diagnoses of Severe protein-calorie malnutrition.    This patient is being managed with:   Diet Consistent Carbohydrate w/Evening Snack-  Entered: Mar 14 2025 11:30AM  
Diet, Consistent Carbohydrate w/Evening Snack (03-14-25 @ 11:41) [Active]
Diet, Consistent Carbohydrate w/Evening Snack (03-14-25 @ 11:41) [Active]
This patient has been assessed with a concern for Malnutrition and has been determined to have a diagnosis/diagnoses of Severe protein-calorie malnutrition.    This patient is being managed with:   Diet Consistent Carbohydrate w/Evening Snack-  Entered: Mar 14 2025 11:30AM

## 2025-03-21 NOTE — DISCHARGE NOTE NURSING/CASE MANAGEMENT/SOCIAL WORK - NSDCPEFALRISK_GEN_ALL_CORE
For information on Fall & Injury Prevention, visit: [email or web address] OR  [email or web address] OR  https://www.cdc.gov/steadi/patient.html

## 2025-03-21 NOTE — PROGRESS NOTE ADULT - PROBLEM SELECTOR PLAN 4
-c/w buproprion, escitalopram, quetiapine -c/w buproprion, escitalopram, quetiapine  -has intermittent episodes of paranoia, waxing and waning mental status. Suspect combination of delirium with some component of possible psychosis. Lower suspicion for CNS malignancy in absence of other focal deficits so will hold off brain imaging for now

## 2025-03-21 NOTE — ADVANCED PRACTICE NURSE CONSULT - REASON FOR CONSULT
Immunotherapy
Wound care consult initiated by RN to assess patient's skin for a possible sacral/buttocks deep tissue injury present on admission     Reason for Admission: Toxic metabolic encephalopathy, sepsis 2/2 UTI  History of Present Illness:   69 year old M with a history of renal transplant (2012) on tacrolimus, s/p left nephrectomy, DLBCL s/p C1 R mini CHOP (chemo held 2/2 recent infections), peripheral neuropathy, hypothyroidism, retroperitoneal fibrosis encasing veins, HTN, HLD, GERD, anxiety/depression, ANGELIKA and recent admission at [facility] (1/17 - 2/6/25) for sepsis 2/2 Klebsiella pneumoniae [facility]BL [facility] (s/p completion of atovaquone and pred course) presenting after unwitnessed fall at his rehab facility.     On assessment patient oriented to self and place but incoherent and verbally aggressive w tangential thoughts, refusing exam and assessment and unable to answer history questions. Details regarding the fall are limited as collateral information limited. Attempted to call patient's wife who is the emergency contact but was not able to reach. Patient's son was contacted who reports his father "acts this way" when he has infections but was unsure about preceding events.     On arrival febrile to 100.7 rectally, /65, HR 88, RR 18, Sat 100% on RA. RVP neg. UA w mod leuk esterase, neg nitrite, 0-2 wbc. CT head/ c spine neg for acute territorial infarct, hematoma or mass effect. No acute fracture or subluxation of the cervical spine. CT A/P with w severe proctocolitis and cystitis. Received Vanc and zosyn x1, 1 L LR bolus,

## 2025-03-21 NOTE — PROGRESS NOTE ADULT - SUBJECTIVE AND OBJECTIVE BOX
Diagnosis DLBCL/PTLD    Protocol/Chemo Regimen: sunil-revlimid-rituximab  Day: C3D2     Pt endorsed: Odd affect this AM, states stationary objects such as cups and utensils have been moving around and that he is being "messed with."      Allergies    No Known Allergies    Intolerances        ANTIMICROBIALS  trimethoprim  160 mG/sulfamethoxazole 800 mG 1 Tablet(s) Oral daily  valACYclovir 500 milliGRAM(s) Oral every 12 hours  vancomycin  IVPB 1000 milliGRAM(s) IV Intermittent every 12 hours      HEME/ONC MEDICATIONS  aspirin enteric coated 81 milliGRAM(s) Oral daily  enoxaparin Injectable 40 milliGRAM(s) SubCutaneous every 24 hours  lenalidomide 10 milliGRAM(s) Oral at bedtime      STANDING MEDICATIONS  buPROPion XL (24-Hour) . 300 milliGRAM(s) Oral daily  carvedilol 12.5 milliGRAM(s) Oral every 12 hours  chlorhexidine 2% Cloths 1 Application(s) Topical <User Schedule>  chlorhexidine 4% Liquid 1 Application(s) Topical <User Schedule>  cloNIDine 0.1 milliGRAM(s) Oral three times a day  dextrose 5%. 1000 milliLiter(s) IV Continuous <Continuous>  dextrose 5%. 1000 milliLiter(s) IV Continuous <Continuous>  dextrose 50% Injectable 25 Gram(s) IV Push once  dextrose 50% Injectable 12.5 Gram(s) IV Push once  dextrose 50% Injectable 25 Gram(s) IV Push once  escitalopram 10 milliGRAM(s) Oral daily  glucagon  Injectable 1 milliGRAM(s) IntraMuscular once  hydrALAZINE 100 milliGRAM(s) Oral every 8 hours  insulin glargine Injectable (LANTUS) 24 Unit(s) SubCutaneous at bedtime  insulin lispro (ADMELOG) corrective regimen sliding scale   SubCutaneous three times a day before meals  insulin lispro (ADMELOG) corrective regimen sliding scale   SubCutaneous at bedtime  insulin lispro Injectable (ADMELOG) 20 Unit(s) SubCutaneous three times a day before meals  lactated ringers. 1000 milliLiter(s) IV Continuous <Continuous>  levothyroxine 88 MICROGram(s) Oral daily  melatonin 3 milliGRAM(s) Oral at bedtime  pantoprazole    Tablet 40 milliGRAM(s) Oral before breakfast  polyethylene glycol 3350 17 Gram(s) Oral two times a day  predniSONE   Tablet 5 milliGRAM(s) Oral daily  QUEtiapine 25 milliGRAM(s) Oral at bedtime  rosuvastatin 10 milliGRAM(s) Oral at bedtime  senna 2 Tablet(s) Oral at bedtime  tacrolimus 1.5 milliGRAM(s) Oral two times a day      PRN MEDICATIONS  aluminum hydroxide/magnesium hydroxide/simethicone Suspension 30 milliLiter(s) Oral every 4 hours PRN  dextrose Oral Gel 15 Gram(s) Oral once PRN  OLANZapine Injectable 2.5 milliGRAM(s) IntraMuscular every 6 hours PRN  ondansetron Injectable 4 milliGRAM(s) IV Push every 8 hours PRN  sodium chloride 0.9% lock flush 10 milliLiter(s) IV Push every 1 hour PRN        Vital Signs Last 24 Hrs  T(C): 36.8 (20 Mar 2025 23:45), Max: 37.1 (20 Mar 2025 18:30)  T(F): 98.2 (20 Mar 2025 23:45), Max: 98.7 (20 Mar 2025 18:30)  HR: 74 (21 Mar 2025 06:10) (64 - 74)  BP: 156/69 (21 Mar 2025 06:10) (140/64 - 167/67)  BP(mean): --  RR: 18 (21 Mar 2025 06:10) (16 - 18)  SpO2: 98% (21 Mar 2025 06:10) (95% - 99%)    Parameters below as of 21 Mar 2025 06:10  Patient On (Oxygen Delivery Method): room air        PHYSICAL EXAM  General: adult in NAD  HEENT: clear oropharynx, anicteric sclera, pink conjunctiva  Neck: supple  CV: normal S1/S2 RRR  Lungs: positive air movement b/l ant lungs,clear to auscultation, no wheezes, no rales  Abdomen: soft non-tender non-distended, (+) BS  Ext: no edema  Skin: no rashes and no petechiae  Neuro: alert and oriented X 2  Psych: odd affect, paranoid      LABS:    Blood Cultures:                           9.1    4.54  )-----------( 237      ( 21 Mar 2025 07:08 )             28.1         Mean Cell Volume : 98.3 fl  Mean Cell Hemoglobin : 31.8 pg  Mean Cell Hemoglobin Concentration : 32.4 g/dL  Auto Neutrophil # : x  Auto Lymphocyte # : x  Auto Monocyte # : x  Auto Eosinophil # : x  Auto Basophil # : x  Auto Neutrophil % : x  Auto Lymphocyte % : x  Auto Monocyte % : x  Auto Eosinophil % : x  Auto Basophil % : x      03-21    137  |  102  |  22  ----------------------------<  210[H]  4.5   |  25  |  0.87    Ca    9.8      21 Mar 2025 07:07  Phos  2.6     03-21  Mg     1.6     03-21    TPro  6.1  /  Alb  3.0[L]  /  TBili  0.2  /  DBili  x   /  AST  13  /  ALT  16  /  AlkPhos  82  03-21      Mg 1.6  Phos 2.6          LDH --  Uric Acid 5.9        RADIOLOGY & ADDITIONAL STUDIES:

## 2025-03-21 NOTE — DISCHARGE NOTE PROVIDER - HOSPITAL COURSE
69 year old M with a history of renal transplant (2012) on tacrolimus, s/p left nephrectomy, DLBCL s/p C1 R mini CHOP (chemo held 2/2 recent infections), peripheral neuropathy, hypothyroidism, retroperitoneal fibrosis encasing veins, HTN, HLD, GERD, anxiety/depression, ANGELIKA and recent admission at [facility] (1/17 - 2/6/25) for sepsis 2/2 Klebsiella pneumoniae ESBL UTI and AHRF c/f PJP pneumonia (s/p completion of atovaquone and pred course) presenting after unwitnessed fall at his rehab facility. Pt was found to have GI PCR with Campylobacter s/p 5 days of IV ertapenem and BCx with staph hominis and staph haemolyticus bacteremia and received a 7 day course of IV vancomycin for this. BCx cleared, and MRI pelvis was without evidence of sacral osteomyelitis. Pt was then transferred to [facility] and received C3D1 polatuzumab+revlimid+rituximab on 3/20/25. Pt is afebrile, medically stable for discharge back to rehab.

## 2025-03-21 NOTE — DISCHARGE NOTE NURSING/CASE MANAGEMENT/SOCIAL WORK - NSDCFUADDAPPT_GEN_ALL_CORE_FT
APPTS ARE READY TO BE MADE: [X] YES    Best Family or Patient Contact (if needed):    Additional Information about above appointments (if needed):    1: Dr Garcia  2: Infectious diseases  3:     Other comments or requests:

## 2025-03-21 NOTE — PROGRESS NOTE ADULT - NSPROGADDITIONALINFOA_GEN_ALL_CORE
Contact via Microsoft Teams during business hours  To reach covering provider access AMION via sunrise tools  For Urgent matters/after-hours/weekends/holidays please page endocrine fellow on call   For nonurgent matters please email [email or web address]    Please note that this patient may be followed by different provider tomorrow.  Notify endocrine 24 hours prior to discharge for final recommendations
Contact via Microsoft Teams during business hours  To reach covering provider access AMION via sunrise tools  For Urgent matters/after-hours/weekends/holidays please page endocrine fellow on call   For nonurgent matters please email [email or web address]    Please note that this patient may be followed by different provider tomorrow.  Notify endocrine 24 hours prior to discharge for final recommendations
No answer from spouse Ludmila. Left Vm    60 minutes spent  Ludmila Leyva MD  Division of Hospital Medicine  Available on Microsoft Teams
Contact via Microsoft Teams during business hours  To reach covering provider access AMION via sunrise tools  For Urgent matters/after-hours/weekends/holidays please page endocrine fellow on call   For nonurgent matters please email [email or web address]    Please note that this patient may be followed by different provider tomorrow.  Notify endocrine 24 hours prior to discharge for final recommendations
Transferred to [facility] under Malignant Heme service. Signed out to [facility] acp    Updated wife Ludmila    52 minutes spent  Ludmila Leyva MD  Division of Hospital Medicine  Available on Microsoft Teams
I spoke with patient's wife, Ludmila. All questions answered.. [phone]
I spoke with wife over the phone. answered all questions
55 minutes spent  Ludmila Leyva MD  Division of Hospital Medicine  Available on Microsoft Teams

## 2025-03-21 NOTE — DISCHARGE NOTE NURSING/CASE MANAGEMENT/SOCIAL WORK - PATIENT PORTAL LINK FT
You can access the FollowMyHealth Patient Portal offered by [facility] by registering at the following website: [email or web address]. By joining [employer]’s FollowMyHealth portal, you will also be able to view your health information using other applications (apps) compatible with our system.

## 2025-03-21 NOTE — DISCHARGE NOTE PROVIDER - NSDCFUADDAPPT_GEN_ALL_CORE_FT
APPTS ARE READY TO BE MADE: [X] YES    Best Family or Patient Contact (if needed):    Additional Information about above appointments (if needed):    1: Dr Garcia  2: Infectious diseases  3:     Other comments or requests:    APPTS ARE READY TO BE MADE: [X] YES    Best Family or Patient Contact (if needed):    Additional Information about above appointments (if needed):    1: Dr Garcia  2: Infectious diseases  3:     Other comments or requests:     Patient is being transferred. Caregiver will arrange follow up.

## 2025-03-21 NOTE — PROGRESS NOTE ADULT - REASON FOR ADMISSION
Toxic metabolic encephalopathy, sepsis 2/2 UTI

## 2025-03-21 NOTE — DISCHARGE NOTE NURSING/CASE MANAGEMENT/SOCIAL WORK - FINANCIAL ASSISTANCE
[facility] provides services at a reduced cost to those who are determined to be eligible through [facility]’s financial assistance program. Information regarding [facility]’s financial assistance program can be found by going to [email or web address] or by calling [phone].

## 2025-03-21 NOTE — DISCHARGE NOTE PROVIDER - NSDCMRMEDTOKEN_GEN_ALL_CORE_FT
aspirin 81 mg oral delayed release tablet: 1 tab(s) orally once a day  buPROPion 300 mg/24 hours (XL) oral tablet, extended release: 1 tab(s) orally once a day  cloNIDine 0.1 mg oral tablet: 1 tab(s) orally 3 times a day hold for SBP less than 100, HR&lt;60  Coreg 12.5 mg oral tablet: 1 tab(s) orally every 12 hours  Crestor 10 mg oral tablet: 1 tab(s) orally once a day (at bedtime)  escitalopram 10 mg oral tablet: 1 tab(s) orally once a day  hydrALAZINE 100 mg oral tablet: 1 tab(s) orally every 8 hours  insulin glargine 100 units/mL subcutaneous solution: 24 unit(s) subcutaneous once a day (at bedtime)  insulin lispro 100 units/mL injectable solution: 20 unit(s) injectable 3 times a day (before meals)  lenalidomide 10 mg oral capsule: 1 cap(s) orally once a day (at bedtime) For days 1-14 days out of a 21 day cycle (take until 4/3. then hold for a week, restart on 4/10 )  levothyroxine 88 mcg (0.088 mg) oral tablet: 1 tab(s) orally once a day  polyethylene glycol 3350 oral powder for reconstitution: 17 gram(s) orally 2 times a day  predniSONE 5 mg oral tablet: 1 tab(s) orally once a day  Protonix 40 mg oral delayed release tablet: 1 tab(s) orally once a day (in the morning)  QUEtiapine 25 mg oral tablet: 1 tab(s) orally once a day (at bedtime)  senna leaf extract oral tablet: 2 tab(s) orally once a day (at bedtime)  sulfamethoxazole-trimethoprim 800 mg-160 mg oral tablet: 1 tab(s) orally once a day  tacrolimus 0.5 mg oral capsule: 3 cap(s) orally 2 times a day  valACYclovir 500 mg oral tablet: 1 tab(s) orally every 12 hours   aspirin 81 mg oral delayed release tablet: 1 tab(s) orally once a day  buPROPion 300 mg/24 hours (XL) oral tablet, extended release: 1 tab(s) orally once a day  cloNIDine 0.1 mg oral tablet: 1 tab(s) orally 3 times a day hold for SBP less than 100, HR&lt;60  Coreg 12.5 mg oral tablet: 1 tab(s) orally every 12 hours  Crestor 10 mg oral tablet: 1 tab(s) orally once a day (at bedtime)  escitalopram 10 mg oral tablet: 1 tab(s) orally once a day  hydrALAZINE 100 mg oral tablet: 1 tab(s) orally every 8 hours  insulin glargine 100 units/mL subcutaneous solution: 28 unit(s) subcutaneous once a day (at bedtime)  insulin lispro 100 units/mL injectable solution: 24 unit(s) injectable 3 times a day (before meals)  lenalidomide 10 mg oral capsule: 1 cap(s) orally once a day (at bedtime) For days 1-14 days out of a 21 day cycle (take until 4/3. then hold for a week, restart on 4/10 )  levothyroxine 88 mcg (0.088 mg) oral tablet: 1 tab(s) orally once a day  polyethylene glycol 3350 oral powder for reconstitution: 17 gram(s) orally 2 times a day  predniSONE 5 mg oral tablet: 1 tab(s) orally once a day  Protonix 40 mg oral delayed release tablet: 1 tab(s) orally once a day (in the morning)  QUEtiapine 25 mg oral tablet: 1 tab(s) orally once a day (at bedtime)  senna leaf extract oral tablet: 2 tab(s) orally once a day (at bedtime)  sulfamethoxazole-trimethoprim 800 mg-160 mg oral tablet: 1 tab(s) orally once a day  tacrolimus 0.5 mg oral capsule: 3 cap(s) orally 2 times a day  valACYclovir 500 mg oral tablet: 1 tab(s) orally every 12 hours

## 2025-03-21 NOTE — PROGRESS NOTE ADULT - ASSESSMENT
69M with hx of recently diagnosed DLBCL on polatuzumab-revlimid-rituximab, advanced PTLD, RP fibrosis, DM, HTN, HLD, ESRD on HD s/p renal transplant 2012 (from brother) on tacro, hypothyroidism, recurrent infections (sacral OM, ESBL UTI), admitted for sepsis. Now transferred to [facility] for C3 Chao-R2.

## 2025-03-21 NOTE — DISCHARGE NOTE PROVIDER - NSFOLLOWUPCLINICS_GEN_ALL_ED_FT
[facility] Infectious Disease  HIV/AIDS Research & Treatment  [address] [place] [address]  Phone: [phone]  Fax:

## 2025-03-21 NOTE — PROGRESS NOTE ADULT - ASSESSMENT
69 year old M with a history of renal transplant (2012) on tacrolimus, s/p left nephrectomy, DLBCL s/p C1 R mini CHOP (chemo held 2/2 recent infections), peripheral neuropathy, hypothyroidism, retroperitoneal fibrosis encasing veins, HTN, HLD, GERD, anxiety/depression, ANGELIKA and recent admission at [facility] (1/17 - 2/6/25) for sepsis 2/2 Klebsiella pneumoniae ESBL UTI and AHRF c/f PJP pneumonia (s/p completion of atovaquone and pred course) pw unwitnessed fall, in setting of sepsis likely 2/2 UTI and toxic metabolic encephalopathy, CT A/P w severe proctocolitis and cystitis.     Endocrine consulted for management of uncontrolled T2DM. Patient came from rehab after a fall. Patient reports feeling okay, eating mostly full meals and tolerating POs. BG have been hyperglycemic to the high 200s despite increase in insulin doses. Will increase insulin regimen for tighter BG control. No hypoglycemia. Patient continues on oral Pred 5mg QD (home dose). Endocrine will closely monitor BG and adjust insulin as needed for BG goal 100-180mg/dL inpatient.       #Type 2 diabetes mellitus   A1C with Estimated Average Glucose Result: 10.1 % (03-14-25 @ 07:08)  A1C with Estimated Average Glucose Result: 9.6 % (03-13-25 @ 15:26)  A1C with Estimated Average Glucose Result: 8.4 % (01-28-25 @ 07:38)  A1C with Estimated Average Glucose Result: 7.8 % (12-29-24 @ 07:07)  A1C with Estimated Average Glucose Result: 7.7 % (12-28-24 @ 10:06)    Endocrinologist: Dr. Romero  Regimen in rehab in Feb previous hospitalization/rehab: Lantus 38u QHS and Admelog 10u TID AC, uses Dexcom G6  Rehab DM management after recent d/c on 2/6:   - While on prednisone 20mg daily pt was on Lantus 11 units QHS and Admelog 24 units with breakfast, 18units with lunch and 14 units with dinner,  Per rehab records, pt is on 20 units glargine in PM and 10 units glargine in AM and Admelog 24/18/14 premeals.

## 2025-03-21 NOTE — PROGRESS NOTE ADULT - PROBLEM SELECTOR PLAN 2
-Bcx x2 on 3/13 with Staph hominis+staph haemolyticus, vanc and tetracycline sensitive. Per discussion with ID fellow, this may be contaminant as no apparent source and MRI pelvis negative for osteomyelitis  -repeat BCx on 3/16 NGTD  -GI PCR 3/14 Campylobacter  -MR pelvis negative for sacral OM  -s/p IV vanc for bacteremia  -c/w bactrim DS daily indefinitely (for PJP infection), valtrex 500mg BID ppx  - 3/21/25: d/w transplant ID: completed course of abx and no contraindications to discharge, fungitell to be repeated as outpatient

## 2025-03-21 NOTE — PROGRESS NOTE ADULT - PROBLEM SELECTOR PROBLEM 2
Hypothyroidism
UTI (urinary tract infection)
Infectious disease
Hypothyroidism
Hypothyroidism
UTI (urinary tract infection)
UTI (urinary tract infection)
Infectious disease
UTI (urinary tract infection)

## 2025-03-21 NOTE — DISCHARGE NOTE PROVIDER - NSDCFUSCHEDAPPT_GEN_ALL_CORE_FT
Valorie Aguilar  [facility]  Felicia Otto  Scheduled Appointment: 03/27/2025

## 2025-03-21 NOTE — PROGRESS NOTE ADULT - PROVIDER SPECIALTY LIST ADULT
Transplant Nephrology
Hospitalist
Internal Medicine
Transplant ID
Transplant ID
Endocrinology
Heme/Onc
Heme/Onc
Transplant ID
Transplant ID
Heme/Onc
Internal Medicine
Transplant ID
Endocrinology
Endocrinology
Internal Medicine

## 2025-03-21 NOTE — DISCHARGE NOTE PROVIDER - NSDCCPCAREPLAN_GEN_ALL_CORE_FT
PRINCIPAL DISCHARGE DIAGNOSIS  Diagnosis: DLBCL (diffuse large B cell lymphoma)  Assessment and Plan of Treatment: We gave you cycle 3 of your chemo on 3/20. Please continue taking revlimid on days 1-14 as part of a 21 day cycle (until 4/3 which is day 14). Please follow up with Dr Garcia. The team will reach out to you for scheduling.      SECONDARY DISCHARGE DIAGNOSES  Diagnosis: Infectious disease  Assessment and Plan of Treatment: You were found to have a couple of infections with Campylobacter and Staph, and you received a course of several antibiotics (Vancomycin, ertapenem). The infectious disease doctors saw you and recommended to monitor off antibiotics now that you have completed your treatment course. We will monitor your fungitell level outpatient. Please follow up with them outpatient.

## 2025-03-21 NOTE — PROGRESS NOTE ADULT - PROBLEM SELECTOR PLAN 6
DVT ppx: lovenox+ASA (revlimid) DVT ppx: lovenox+ASA (revlimid)  Dispo: medically cleared for DC home

## 2025-03-21 NOTE — PHARMACOTHERAPY INTERVENTION NOTE - COMMENTS
JAN CALVIN, 69y Male with CNS staph bacteremia (3/4 bottles with Staph hominis, 1/4 with Staph haemolyticus, and 1/4 with Staph epi) and GI PCR positive for campylobacter. Now s/p azithromycin, on vancomycin to complete a 7 day course.    Recommendation(s):  - Discontinue vancomycin as patient has completed 7 day course    Jose Guadalupe Swain, PharmD  PGY-2 ID Pharmacy Resident  Available on [employer] Teams

## 2025-03-24 LAB — GALACTOMANNAN AG SERPL-ACNC: 0.65 INDEX — HIGH (ref 0–0.49)

## 2025-03-25 LAB — INTERPRETATION SERPL IFE-IMP: SIGNIFICANT CHANGE UP

## 2025-03-26 ENCOUNTER — NON-APPOINTMENT (OUTPATIENT)
Age: 70
End: 2025-03-26

## 2025-03-27 ENCOUNTER — APPOINTMENT (OUTPATIENT)
Dept: HEMATOLOGY ONCOLOGY | Facility: CLINIC | Age: 70
End: 2025-03-27

## 2025-04-01 ENCOUNTER — APPOINTMENT (OUTPATIENT)
Dept: HEMATOLOGY ONCOLOGY | Facility: CLINIC | Age: 70
End: 2025-04-01
Payer: MEDICARE

## 2025-04-01 PROCEDURE — 99212 OFFICE O/P EST SF 10 MIN: CPT | Mod: 2W

## 2025-04-10 ENCOUNTER — APPOINTMENT (OUTPATIENT)
Dept: HEMATOLOGY ONCOLOGY | Facility: CLINIC | Age: 70
End: 2025-04-10

## 2025-04-10 ENCOUNTER — APPOINTMENT (OUTPATIENT)
Dept: CT IMAGING | Facility: IMAGING CENTER | Age: 70
End: 2025-04-10

## 2025-04-10 ENCOUNTER — OUTPATIENT (OUTPATIENT)
Dept: OUTPATIENT SERVICES | Facility: HOSPITAL | Age: 70
LOS: 1 days | End: 2025-04-10
Payer: MEDICARE

## 2025-04-10 ENCOUNTER — RESULT REVIEW (OUTPATIENT)
Age: 70
End: 2025-04-10

## 2025-04-10 ENCOUNTER — APPOINTMENT (OUTPATIENT)
Dept: HEMATOLOGY ONCOLOGY | Facility: CLINIC | Age: 70
End: 2025-04-10
Payer: MEDICARE

## 2025-04-10 ENCOUNTER — APPOINTMENT (OUTPATIENT)
Dept: INFUSION THERAPY | Facility: HOSPITAL | Age: 70
End: 2025-04-10

## 2025-04-10 DIAGNOSIS — C83.30 DIFFUSE LARGE B-CELL LYMPHOMA, UNSPECIFIED SITE: ICD-10-CM

## 2025-04-10 DIAGNOSIS — Z98.42 CATARACT EXTRACTION STATUS, LEFT EYE: Chronic | ICD-10-CM

## 2025-04-10 DIAGNOSIS — Z98.41 CATARACT EXTRACTION STATUS, RIGHT EYE: Chronic | ICD-10-CM

## 2025-04-10 DIAGNOSIS — Z98.89 OTHER SPECIFIED POSTPROCEDURAL STATES: Chronic | ICD-10-CM

## 2025-04-10 DIAGNOSIS — N13.5 CROSSING VESSEL AND STRICTURE OF URETER WITHOUT HYDRONEPHROSIS: Chronic | ICD-10-CM

## 2025-04-10 DIAGNOSIS — Z90.49 ACQUIRED ABSENCE OF OTHER SPECIFIED PARTS OF DIGESTIVE TRACT: Chronic | ICD-10-CM

## 2025-04-10 DIAGNOSIS — I77.0 ARTERIOVENOUS FISTULA, ACQUIRED: Chronic | ICD-10-CM

## 2025-04-10 DIAGNOSIS — Z90.5 ACQUIRED ABSENCE OF KIDNEY: Chronic | ICD-10-CM

## 2025-04-10 DIAGNOSIS — Z94.0 KIDNEY TRANSPLANT STATUS: Chronic | ICD-10-CM

## 2025-04-10 LAB
ALBUMIN SERPL ELPH-MCNC: 3.5 G/DL — SIGNIFICANT CHANGE UP (ref 3.3–5)
ALP SERPL-CCNC: 83 U/L — SIGNIFICANT CHANGE UP (ref 40–120)
ALT FLD-CCNC: 21 U/L — SIGNIFICANT CHANGE UP (ref 10–45)
ANION GAP SERPL CALC-SCNC: 7 MMOL/L — SIGNIFICANT CHANGE UP (ref 5–17)
AST SERPL-CCNC: 21 U/L — SIGNIFICANT CHANGE UP (ref 10–40)
BASOPHILS # BLD AUTO: 0.07 K/UL — SIGNIFICANT CHANGE UP (ref 0–0.2)
BASOPHILS NFR BLD AUTO: 1.3 % — SIGNIFICANT CHANGE UP (ref 0–2)
BILIRUB SERPL-MCNC: 0.2 MG/DL — SIGNIFICANT CHANGE UP (ref 0.2–1.2)
BUN SERPL-MCNC: 21 MG/DL — SIGNIFICANT CHANGE UP (ref 7–23)
CALCIUM SERPL-MCNC: 9.6 MG/DL — SIGNIFICANT CHANGE UP (ref 8.4–10.5)
CHLORIDE SERPL-SCNC: 98 MMOL/L — SIGNIFICANT CHANGE UP (ref 96–108)
CO2 SERPL-SCNC: 27 MMOL/L — SIGNIFICANT CHANGE UP (ref 22–31)
CREAT SERPL-MCNC: 0.75 MG/DL — SIGNIFICANT CHANGE UP (ref 0.5–1.3)
EGFR: 99 ML/MIN/1.73M2 — SIGNIFICANT CHANGE UP
EGFR: 99 ML/MIN/1.73M2 — SIGNIFICANT CHANGE UP
EOSINOPHIL # BLD AUTO: 0.43 K/UL — SIGNIFICANT CHANGE UP (ref 0–0.5)
EOSINOPHIL NFR BLD AUTO: 8.1 % — HIGH (ref 0–6)
GLUCOSE SERPL-MCNC: 207 MG/DL — HIGH (ref 70–99)
HCT VFR BLD CALC: 31.6 % — LOW (ref 39–50)
HGB BLD-MCNC: 10.6 G/DL — LOW (ref 13–17)
IMM GRANULOCYTES NFR BLD AUTO: 0.9 % — SIGNIFICANT CHANGE UP (ref 0–0.9)
LYMPHOCYTES # BLD AUTO: 0.51 K/UL — LOW (ref 1–3.3)
LYMPHOCYTES # BLD AUTO: 9.6 % — LOW (ref 13–44)
MCHC RBC-ENTMCNC: 32.3 PG — SIGNIFICANT CHANGE UP (ref 27–34)
MCHC RBC-ENTMCNC: 33.5 G/DL — SIGNIFICANT CHANGE UP (ref 32–36)
MCV RBC AUTO: 96.3 FL — SIGNIFICANT CHANGE UP (ref 80–100)
MONOCYTES # BLD AUTO: 0.7 K/UL — SIGNIFICANT CHANGE UP (ref 0–0.9)
MONOCYTES NFR BLD AUTO: 13.2 % — SIGNIFICANT CHANGE UP (ref 2–14)
NEUTROPHILS # BLD AUTO: 3.55 K/UL — SIGNIFICANT CHANGE UP (ref 1.8–7.4)
NEUTROPHILS NFR BLD AUTO: 66.9 % — SIGNIFICANT CHANGE UP (ref 43–77)
NRBC BLD AUTO-RTO: 0 /100 WBCS — SIGNIFICANT CHANGE UP (ref 0–0)
PLATELET # BLD AUTO: 144 K/UL — LOW (ref 150–400)
POTASSIUM SERPL-MCNC: 4.7 MMOL/L — SIGNIFICANT CHANGE UP (ref 3.5–5.3)
POTASSIUM SERPL-SCNC: 4.7 MMOL/L — SIGNIFICANT CHANGE UP (ref 3.5–5.3)
PROT SERPL-MCNC: 6.5 G/DL — SIGNIFICANT CHANGE UP (ref 6–8.3)
RBC # BLD: 3.28 M/UL — LOW (ref 4.2–5.8)
RBC # FLD: 15.3 % — HIGH (ref 10.3–14.5)
SODIUM SERPL-SCNC: 132 MMOL/L — LOW (ref 135–145)
WBC # BLD: 5.31 K/UL — SIGNIFICANT CHANGE UP (ref 3.8–10.5)
WBC # FLD AUTO: 5.31 K/UL — SIGNIFICANT CHANGE UP (ref 3.8–10.5)

## 2025-04-10 PROCEDURE — 99213 OFFICE O/P EST LOW 20 MIN: CPT

## 2025-04-10 PROCEDURE — 74176 CT ABD & PELVIS W/O CONTRAST: CPT

## 2025-04-10 PROCEDURE — 71250 CT THORAX DX C-: CPT | Mod: 26

## 2025-04-10 PROCEDURE — 71250 CT THORAX DX C-: CPT

## 2025-04-10 PROCEDURE — 74176 CT ABD & PELVIS W/O CONTRAST: CPT | Mod: 26

## 2025-04-27 ENCOUNTER — OUTPATIENT (OUTPATIENT)
Dept: OUTPATIENT SERVICES | Facility: HOSPITAL | Age: 70
LOS: 1 days | Discharge: HOME | End: 2025-04-27

## 2025-04-27 DIAGNOSIS — Z98.89 OTHER SPECIFIED POSTPROCEDURAL STATES: Chronic | ICD-10-CM

## 2025-04-27 DIAGNOSIS — Z98.42 CATARACT EXTRACTION STATUS, LEFT EYE: Chronic | ICD-10-CM

## 2025-04-27 DIAGNOSIS — Z90.5 ACQUIRED ABSENCE OF KIDNEY: Chronic | ICD-10-CM

## 2025-04-27 DIAGNOSIS — Z98.41 CATARACT EXTRACTION STATUS, RIGHT EYE: Chronic | ICD-10-CM

## 2025-04-27 DIAGNOSIS — I77.0 ARTERIOVENOUS FISTULA, ACQUIRED: Chronic | ICD-10-CM

## 2025-04-27 DIAGNOSIS — Z94.0 KIDNEY TRANSPLANT STATUS: Chronic | ICD-10-CM

## 2025-04-27 DIAGNOSIS — C83.30 DIFFUSE LARGE B-CELL LYMPHOMA, UNSPECIFIED SITE: ICD-10-CM

## 2025-04-27 DIAGNOSIS — Z90.49 ACQUIRED ABSENCE OF OTHER SPECIFIED PARTS OF DIGESTIVE TRACT: Chronic | ICD-10-CM

## 2025-04-27 DIAGNOSIS — N13.5 CROSSING VESSEL AND STRICTURE OF URETER WITHOUT HYDRONEPHROSIS: Chronic | ICD-10-CM

## 2025-05-01 ENCOUNTER — RESULT REVIEW (OUTPATIENT)
Age: 70
End: 2025-05-01

## 2025-05-01 ENCOUNTER — APPOINTMENT (OUTPATIENT)
Dept: HEMATOLOGY ONCOLOGY | Facility: CLINIC | Age: 70
End: 2025-05-01

## 2025-05-01 ENCOUNTER — NON-APPOINTMENT (OUTPATIENT)
Age: 70
End: 2025-05-01

## 2025-05-01 ENCOUNTER — APPOINTMENT (OUTPATIENT)
Dept: HEMATOLOGY ONCOLOGY | Facility: CLINIC | Age: 70
End: 2025-05-01
Payer: MEDICARE

## 2025-05-01 ENCOUNTER — APPOINTMENT (OUTPATIENT)
Dept: INFUSION THERAPY | Facility: HOSPITAL | Age: 70
End: 2025-05-01

## 2025-05-01 DIAGNOSIS — C83.30 DIFFUSE LARGE B-CELL LYMPHOMA, UNSPECIFIED SITE: ICD-10-CM

## 2025-05-01 LAB
ALBUMIN SERPL ELPH-MCNC: 3.3 G/DL — SIGNIFICANT CHANGE UP (ref 3.3–5)
ALP SERPL-CCNC: 70 U/L — SIGNIFICANT CHANGE UP (ref 40–120)
ALT FLD-CCNC: 34 U/L — SIGNIFICANT CHANGE UP (ref 10–45)
ANION GAP SERPL CALC-SCNC: 8 MMOL/L — SIGNIFICANT CHANGE UP (ref 5–17)
AST SERPL-CCNC: 30 U/L — SIGNIFICANT CHANGE UP (ref 10–40)
BASOPHILS # BLD AUTO: 0.06 K/UL — SIGNIFICANT CHANGE UP (ref 0–0.2)
BASOPHILS NFR BLD AUTO: 1.8 % — SIGNIFICANT CHANGE UP (ref 0–2)
BILIRUB SERPL-MCNC: 0.2 MG/DL — SIGNIFICANT CHANGE UP (ref 0.2–1.2)
BUN SERPL-MCNC: 24 MG/DL — HIGH (ref 7–23)
CALCIUM SERPL-MCNC: 9.2 MG/DL — SIGNIFICANT CHANGE UP (ref 8.4–10.5)
CHLORIDE SERPL-SCNC: 101 MMOL/L — SIGNIFICANT CHANGE UP (ref 96–108)
CO2 SERPL-SCNC: 24 MMOL/L — SIGNIFICANT CHANGE UP (ref 22–31)
CREAT SERPL-MCNC: 0.96 MG/DL — SIGNIFICANT CHANGE UP (ref 0.5–1.3)
EGFR: 87 ML/MIN/1.73M2 — SIGNIFICANT CHANGE UP
EGFR: 87 ML/MIN/1.73M2 — SIGNIFICANT CHANGE UP
EOSINOPHIL # BLD AUTO: 0.08 K/UL — SIGNIFICANT CHANGE UP (ref 0–0.5)
EOSINOPHIL NFR BLD AUTO: 2.4 % — SIGNIFICANT CHANGE UP (ref 0–6)
GLUCOSE SERPL-MCNC: 441 MG/DL — HIGH (ref 70–99)
HCT VFR BLD CALC: 29.7 % — LOW (ref 39–50)
HGB BLD-MCNC: 9.9 G/DL — LOW (ref 13–17)
IMM GRANULOCYTES NFR BLD AUTO: 2.1 % — HIGH (ref 0–0.9)
LYMPHOCYTES # BLD AUTO: 0.39 K/UL — LOW (ref 1–3.3)
LYMPHOCYTES # BLD AUTO: 11.7 % — LOW (ref 13–44)
MCHC RBC-ENTMCNC: 32.2 PG — SIGNIFICANT CHANGE UP (ref 27–34)
MCHC RBC-ENTMCNC: 33.3 G/DL — SIGNIFICANT CHANGE UP (ref 32–36)
MCV RBC AUTO: 96.7 FL — SIGNIFICANT CHANGE UP (ref 80–100)
MONOCYTES # BLD AUTO: 0.5 K/UL — SIGNIFICANT CHANGE UP (ref 0–0.9)
MONOCYTES NFR BLD AUTO: 15 % — HIGH (ref 2–14)
NEUTROPHILS # BLD AUTO: 2.24 K/UL — SIGNIFICANT CHANGE UP (ref 1.8–7.4)
NEUTROPHILS NFR BLD AUTO: 67 % — SIGNIFICANT CHANGE UP (ref 43–77)
NRBC BLD AUTO-RTO: 0 /100 WBCS — SIGNIFICANT CHANGE UP (ref 0–0)
PLATELET # BLD AUTO: 122 K/UL — LOW (ref 150–400)
POTASSIUM SERPL-MCNC: 4.9 MMOL/L — SIGNIFICANT CHANGE UP (ref 3.5–5.3)
POTASSIUM SERPL-SCNC: 4.9 MMOL/L — SIGNIFICANT CHANGE UP (ref 3.5–5.3)
PROT SERPL-MCNC: 6.1 G/DL — SIGNIFICANT CHANGE UP (ref 6–8.3)
RBC # BLD: 3.07 M/UL — LOW (ref 4.2–5.8)
RBC # FLD: 14.6 % — HIGH (ref 10.3–14.5)
SODIUM SERPL-SCNC: 133 MMOL/L — LOW (ref 135–145)
WBC # BLD: 3.34 K/UL — LOW (ref 3.8–10.5)
WBC # FLD AUTO: 3.34 K/UL — LOW (ref 3.8–10.5)

## 2025-05-01 PROCEDURE — 99212 OFFICE O/P EST SF 10 MIN: CPT

## 2025-05-01 RX ORDER — PREDNISONE 5 MG/1
5 TABLET ORAL
Refills: 0 | Status: ACTIVE | COMMUNITY
Start: 2025-05-01

## 2025-05-01 RX ORDER — SENNOSIDES 8.6 MG TABLETS 8.6 MG/1
8.6 TABLET ORAL DAILY
Refills: 0 | Status: ACTIVE | COMMUNITY
Start: 2025-05-01

## 2025-05-01 RX ORDER — TRAZODONE HYDROCHLORIDE 50 MG/1
50 TABLET ORAL
Refills: 0 | Status: ACTIVE | COMMUNITY
Start: 2025-05-01

## 2025-05-01 RX ORDER — LACTULOSE 10 G/15ML
10 SOLUTION ORAL DAILY
Refills: 0 | Status: ACTIVE | COMMUNITY
Start: 2025-05-01

## 2025-05-02 DIAGNOSIS — R11.2 NAUSEA WITH VOMITING, UNSPECIFIED: ICD-10-CM

## 2025-05-02 DIAGNOSIS — Z51.11 ENCOUNTER FOR ANTINEOPLASTIC CHEMOTHERAPY: ICD-10-CM

## 2025-05-22 ENCOUNTER — RESULT REVIEW (OUTPATIENT)
Age: 70
End: 2025-05-22

## 2025-05-22 ENCOUNTER — APPOINTMENT (OUTPATIENT)
Dept: INFUSION THERAPY | Facility: HOSPITAL | Age: 70
End: 2025-05-22

## 2025-05-22 ENCOUNTER — APPOINTMENT (OUTPATIENT)
Dept: HEMATOLOGY ONCOLOGY | Facility: CLINIC | Age: 70
End: 2025-05-22
Payer: MEDICARE

## 2025-05-22 ENCOUNTER — APPOINTMENT (OUTPATIENT)
Dept: HEMATOLOGY ONCOLOGY | Facility: CLINIC | Age: 70
End: 2025-05-22

## 2025-05-22 DIAGNOSIS — R41.82 ALTERED MENTAL STATUS, UNSPECIFIED: ICD-10-CM

## 2025-05-22 DIAGNOSIS — Z87.09 PERSONAL HISTORY OF OTHER DISEASES OF THE RESPIRATORY SYSTEM: ICD-10-CM

## 2025-05-22 DIAGNOSIS — Z86.79 PERSONAL HISTORY OF OTHER DISEASES OF THE CIRCULATORY SYSTEM: ICD-10-CM

## 2025-05-22 DIAGNOSIS — Z86.59 PERSONAL HISTORY OF OTHER MENTAL AND BEHAVIORAL DISORDERS: ICD-10-CM

## 2025-05-22 DIAGNOSIS — Z85.79 PERSONAL HISTORY OF OTHER MALIGNANT NEOPLASMS OF LYMPHOID, HEMATOPOIETIC AND RELATED TISSUES: ICD-10-CM

## 2025-05-22 LAB
ALBUMIN SERPL ELPH-MCNC: 3.6 G/DL — SIGNIFICANT CHANGE UP (ref 3.3–5)
ALP SERPL-CCNC: 81 U/L — SIGNIFICANT CHANGE UP (ref 40–120)
ALT FLD-CCNC: 59 U/L — HIGH (ref 10–45)
ANION GAP SERPL CALC-SCNC: 10 MMOL/L — SIGNIFICANT CHANGE UP (ref 5–17)
AST SERPL-CCNC: 34 U/L — SIGNIFICANT CHANGE UP (ref 10–40)
BASOPHILS # BLD AUTO: 0.08 K/UL — SIGNIFICANT CHANGE UP (ref 0–0.2)
BASOPHILS NFR BLD AUTO: 1.7 % — SIGNIFICANT CHANGE UP (ref 0–2)
BILIRUB SERPL-MCNC: 0.3 MG/DL — SIGNIFICANT CHANGE UP (ref 0.2–1.2)
BUN SERPL-MCNC: 21 MG/DL — SIGNIFICANT CHANGE UP (ref 7–23)
CALCIUM SERPL-MCNC: 9.4 MG/DL — SIGNIFICANT CHANGE UP (ref 8.4–10.5)
CHLORIDE SERPL-SCNC: 98 MMOL/L — SIGNIFICANT CHANGE UP (ref 96–108)
CO2 SERPL-SCNC: 25 MMOL/L — SIGNIFICANT CHANGE UP (ref 22–31)
CREAT SERPL-MCNC: 0.83 MG/DL — SIGNIFICANT CHANGE UP (ref 0.5–1.3)
EGFR: 96 ML/MIN/1.73M2 — SIGNIFICANT CHANGE UP
EGFR: 96 ML/MIN/1.73M2 — SIGNIFICANT CHANGE UP
EOSINOPHIL # BLD AUTO: 0.21 K/UL — SIGNIFICANT CHANGE UP (ref 0–0.5)
EOSINOPHIL NFR BLD AUTO: 4.4 % — SIGNIFICANT CHANGE UP (ref 0–6)
GLUCOSE SERPL-MCNC: 408 MG/DL — HIGH (ref 70–99)
HCT VFR BLD CALC: 36.4 % — LOW (ref 39–50)
HGB BLD-MCNC: 11.7 G/DL — LOW (ref 13–17)
IMM GRANULOCYTES NFR BLD AUTO: 0.8 % — SIGNIFICANT CHANGE UP (ref 0–0.9)
LYMPHOCYTES # BLD AUTO: 0.65 K/UL — LOW (ref 1–3.3)
LYMPHOCYTES # BLD AUTO: 13.7 % — SIGNIFICANT CHANGE UP (ref 13–44)
MCHC RBC-ENTMCNC: 31.5 PG — SIGNIFICANT CHANGE UP (ref 27–34)
MCHC RBC-ENTMCNC: 32.1 G/DL — SIGNIFICANT CHANGE UP (ref 32–36)
MCV RBC AUTO: 98.1 FL — SIGNIFICANT CHANGE UP (ref 80–100)
MONOCYTES # BLD AUTO: 0.43 K/UL — SIGNIFICANT CHANGE UP (ref 0–0.9)
MONOCYTES NFR BLD AUTO: 9.1 % — SIGNIFICANT CHANGE UP (ref 2–14)
NEUTROPHILS # BLD AUTO: 3.33 K/UL — SIGNIFICANT CHANGE UP (ref 1.8–7.4)
NEUTROPHILS NFR BLD AUTO: 70.3 % — SIGNIFICANT CHANGE UP (ref 43–77)
NRBC BLD AUTO-RTO: 0 /100 WBCS — SIGNIFICANT CHANGE UP (ref 0–0)
PLATELET # BLD AUTO: 186 K/UL — SIGNIFICANT CHANGE UP (ref 150–400)
POTASSIUM SERPL-MCNC: 4.6 MMOL/L — SIGNIFICANT CHANGE UP (ref 3.5–5.3)
POTASSIUM SERPL-SCNC: 4.6 MMOL/L — SIGNIFICANT CHANGE UP (ref 3.5–5.3)
PROT SERPL-MCNC: 6.5 G/DL — SIGNIFICANT CHANGE UP (ref 6–8.3)
RBC # BLD: 3.71 M/UL — LOW (ref 4.2–5.8)
RBC # FLD: 14.5 % — SIGNIFICANT CHANGE UP (ref 10.3–14.5)
SODIUM SERPL-SCNC: 133 MMOL/L — LOW (ref 135–145)
WBC # BLD: 4.74 K/UL — SIGNIFICANT CHANGE UP (ref 3.8–10.5)
WBC # FLD AUTO: 4.74 K/UL — SIGNIFICANT CHANGE UP (ref 3.8–10.5)

## 2025-05-22 PROCEDURE — G2211 COMPLEX E/M VISIT ADD ON: CPT

## 2025-05-22 PROCEDURE — 99215 OFFICE O/P EST HI 40 MIN: CPT

## 2025-06-12 ENCOUNTER — RESULT REVIEW (OUTPATIENT)
Age: 70
End: 2025-06-12

## 2025-06-12 ENCOUNTER — APPOINTMENT (OUTPATIENT)
Dept: INFUSION THERAPY | Facility: HOSPITAL | Age: 70
End: 2025-06-12

## 2025-06-12 ENCOUNTER — APPOINTMENT (OUTPATIENT)
Dept: HEMATOLOGY ONCOLOGY | Facility: CLINIC | Age: 70
End: 2025-06-12
Payer: MEDICARE

## 2025-06-12 ENCOUNTER — OUTPATIENT (OUTPATIENT)
Dept: OUTPATIENT SERVICES | Facility: HOSPITAL | Age: 70
LOS: 1 days | End: 2025-06-12
Payer: MEDICARE

## 2025-06-12 ENCOUNTER — NON-APPOINTMENT (OUTPATIENT)
Age: 70
End: 2025-06-12

## 2025-06-12 ENCOUNTER — APPOINTMENT (OUTPATIENT)
Dept: RADIOLOGY | Facility: IMAGING CENTER | Age: 70
End: 2025-06-12
Payer: MEDICARE

## 2025-06-12 VITALS
RESPIRATION RATE: 18 BRPM | SYSTOLIC BLOOD PRESSURE: 146 MMHG | TEMPERATURE: 98.5 F | DIASTOLIC BLOOD PRESSURE: 57 MMHG | HEART RATE: 68 BPM | HEIGHT: 72 IN | OXYGEN SATURATION: 99 %

## 2025-06-12 DIAGNOSIS — Z90.5 ACQUIRED ABSENCE OF KIDNEY: Chronic | ICD-10-CM

## 2025-06-12 DIAGNOSIS — I77.0 ARTERIOVENOUS FISTULA, ACQUIRED: Chronic | ICD-10-CM

## 2025-06-12 DIAGNOSIS — Z98.42 CATARACT EXTRACTION STATUS, LEFT EYE: Chronic | ICD-10-CM

## 2025-06-12 DIAGNOSIS — D47.Z1 POST-TRANSPLANT LYMPHOPROLIFERATIVE DISORDER (PTLD): ICD-10-CM

## 2025-06-12 LAB
ALBUMIN SERPL ELPH-MCNC: 3.8 G/DL — SIGNIFICANT CHANGE UP (ref 3.3–5)
ALP SERPL-CCNC: 89 U/L — SIGNIFICANT CHANGE UP (ref 40–120)
ALT FLD-CCNC: 78 U/L — HIGH (ref 10–45)
ANION GAP SERPL CALC-SCNC: 12 MMOL/L — SIGNIFICANT CHANGE UP (ref 5–17)
AST SERPL-CCNC: 32 U/L — SIGNIFICANT CHANGE UP (ref 10–40)
BASOPHILS # BLD AUTO: 0.08 K/UL — SIGNIFICANT CHANGE UP (ref 0–0.2)
BASOPHILS NFR BLD AUTO: 1.7 % — SIGNIFICANT CHANGE UP (ref 0–2)
BILIRUB SERPL-MCNC: 0.4 MG/DL — SIGNIFICANT CHANGE UP (ref 0.2–1.2)
BUN SERPL-MCNC: 25 MG/DL — HIGH (ref 7–23)
CALCIUM SERPL-MCNC: 9.4 MG/DL — SIGNIFICANT CHANGE UP (ref 8.4–10.5)
CHLORIDE SERPL-SCNC: 97 MMOL/L — SIGNIFICANT CHANGE UP (ref 96–108)
CO2 SERPL-SCNC: 23 MMOL/L — SIGNIFICANT CHANGE UP (ref 22–31)
CREAT SERPL-MCNC: 1.18 MG/DL — SIGNIFICANT CHANGE UP (ref 0.5–1.3)
EGFR: 68 ML/MIN/1.73M2 — SIGNIFICANT CHANGE UP
EGFR: 68 ML/MIN/1.73M2 — SIGNIFICANT CHANGE UP
EOSINOPHIL # BLD AUTO: 0.15 K/UL — SIGNIFICANT CHANGE UP (ref 0–0.5)
EOSINOPHIL NFR BLD AUTO: 3.3 % — SIGNIFICANT CHANGE UP (ref 0–6)
GLUCOSE SERPL-MCNC: 587 MG/DL — CRITICAL HIGH (ref 70–99)
HCT VFR BLD CALC: 29.1 % — LOW (ref 39–50)
HGB BLD-MCNC: 9.4 G/DL — LOW (ref 13–17)
IMM GRANULOCYTES NFR BLD AUTO: 0.4 % — SIGNIFICANT CHANGE UP (ref 0–0.9)
LYMPHOCYTES # BLD AUTO: 0.65 K/UL — LOW (ref 1–3.3)
LYMPHOCYTES # BLD AUTO: 14.1 % — SIGNIFICANT CHANGE UP (ref 13–44)
MCHC RBC-ENTMCNC: 32.3 G/DL — SIGNIFICANT CHANGE UP (ref 32–36)
MCHC RBC-ENTMCNC: 32.6 PG — SIGNIFICANT CHANGE UP (ref 27–34)
MCV RBC AUTO: 101 FL — HIGH (ref 80–100)
MONOCYTES # BLD AUTO: 0.85 K/UL — SIGNIFICANT CHANGE UP (ref 0–0.9)
MONOCYTES NFR BLD AUTO: 18.5 % — HIGH (ref 2–14)
NEUTROPHILS # BLD AUTO: 2.85 K/UL — SIGNIFICANT CHANGE UP (ref 1.8–7.4)
NEUTROPHILS NFR BLD AUTO: 62 % — SIGNIFICANT CHANGE UP (ref 43–77)
NRBC BLD AUTO-RTO: 0 /100 WBCS — SIGNIFICANT CHANGE UP (ref 0–0)
PLATELET # BLD AUTO: 148 K/UL — LOW (ref 150–400)
POTASSIUM SERPL-MCNC: 4.8 MMOL/L — SIGNIFICANT CHANGE UP (ref 3.5–5.3)
POTASSIUM SERPL-SCNC: 4.8 MMOL/L — SIGNIFICANT CHANGE UP (ref 3.5–5.3)
PROT SERPL-MCNC: 6.8 G/DL — SIGNIFICANT CHANGE UP (ref 6–8.3)
RBC # BLD: 2.88 M/UL — LOW (ref 4.2–5.8)
RBC # FLD: 15.2 % — HIGH (ref 10.3–14.5)
SODIUM SERPL-SCNC: 132 MMOL/L — LOW (ref 135–145)
WBC # BLD: 4.6 K/UL — SIGNIFICANT CHANGE UP (ref 3.8–10.5)
WBC # FLD AUTO: 4.6 K/UL — SIGNIFICANT CHANGE UP (ref 3.8–10.5)

## 2025-06-12 PROCEDURE — 71045 X-RAY EXAM CHEST 1 VIEW: CPT | Mod: 26

## 2025-06-12 PROCEDURE — 99213 OFFICE O/P EST LOW 20 MIN: CPT

## 2025-06-12 PROCEDURE — 71045 X-RAY EXAM CHEST 1 VIEW: CPT

## 2025-06-13 ENCOUNTER — NON-APPOINTMENT (OUTPATIENT)
Age: 70
End: 2025-06-13

## 2025-06-13 LAB
APTT BLD: 40.5 SEC — HIGH (ref 26.1–36.8)
FOLATE SERPL-MCNC: 12 NG/ML — SIGNIFICANT CHANGE UP
INR BLD: 0.86 RATIO — SIGNIFICANT CHANGE UP (ref 0.85–1.16)
PROTHROM AB SERPL-ACNC: 10.2 SEC — SIGNIFICANT CHANGE UP (ref 9.9–13.4)
VIT B12 SERPL-MCNC: 1050 PG/ML — SIGNIFICANT CHANGE UP (ref 232–1245)

## 2025-06-19 ENCOUNTER — OUTPATIENT (OUTPATIENT)
Dept: OUTPATIENT SERVICES | Facility: HOSPITAL | Age: 70
LOS: 1 days | End: 2025-06-19
Payer: MEDICARE

## 2025-06-19 ENCOUNTER — RESULT REVIEW (OUTPATIENT)
Age: 70
End: 2025-06-19

## 2025-06-19 VITALS
HEART RATE: 65 BPM | WEIGHT: 162.92 LBS | TEMPERATURE: 97 F | SYSTOLIC BLOOD PRESSURE: 158 MMHG | DIASTOLIC BLOOD PRESSURE: 70 MMHG | RESPIRATION RATE: 18 BRPM | OXYGEN SATURATION: 98 %

## 2025-06-19 VITALS
OXYGEN SATURATION: 99 % | DIASTOLIC BLOOD PRESSURE: 72 MMHG | HEART RATE: 62 BPM | SYSTOLIC BLOOD PRESSURE: 150 MMHG | RESPIRATION RATE: 18 BRPM

## 2025-06-19 DIAGNOSIS — Z98.89 OTHER SPECIFIED POSTPROCEDURAL STATES: Chronic | ICD-10-CM

## 2025-06-19 DIAGNOSIS — Z94.0 KIDNEY TRANSPLANT STATUS: Chronic | ICD-10-CM

## 2025-06-19 DIAGNOSIS — Z90.5 ACQUIRED ABSENCE OF KIDNEY: Chronic | ICD-10-CM

## 2025-06-19 DIAGNOSIS — D47.Z1 POST-TRANSPLANT LYMPHOPROLIFERATIVE DISORDER (PTLD): ICD-10-CM

## 2025-06-19 DIAGNOSIS — N13.5 CROSSING VESSEL AND STRICTURE OF URETER WITHOUT HYDRONEPHROSIS: Chronic | ICD-10-CM

## 2025-06-19 DIAGNOSIS — Z98.41 CATARACT EXTRACTION STATUS, RIGHT EYE: Chronic | ICD-10-CM

## 2025-06-19 DIAGNOSIS — Z98.42 CATARACT EXTRACTION STATUS, LEFT EYE: Chronic | ICD-10-CM

## 2025-06-19 DIAGNOSIS — I77.0 ARTERIOVENOUS FISTULA, ACQUIRED: Chronic | ICD-10-CM

## 2025-06-19 DIAGNOSIS — Z90.49 ACQUIRED ABSENCE OF OTHER SPECIFIED PARTS OF DIGESTIVE TRACT: Chronic | ICD-10-CM

## 2025-06-19 LAB — GLUCOSE BLDC GLUCOMTR-MCNC: 80 MG/DL — SIGNIFICANT CHANGE UP (ref 70–99)

## 2025-06-19 PROCEDURE — 36561 INSERT TUNNELED CV CATH: CPT

## 2025-06-19 PROCEDURE — C1788: CPT

## 2025-06-19 PROCEDURE — 76937 US GUIDE VASCULAR ACCESS: CPT | Mod: 26

## 2025-06-19 PROCEDURE — 77001 FLUOROGUIDE FOR VEIN DEVICE: CPT | Mod: 26

## 2025-06-19 PROCEDURE — 36561 INSERT TUNNELED CV CATH: CPT | Mod: RT

## 2025-06-19 PROCEDURE — 82962 GLUCOSE BLOOD TEST: CPT

## 2025-06-19 PROCEDURE — 77001 FLUOROGUIDE FOR VEIN DEVICE: CPT

## 2025-06-19 PROCEDURE — C1894: CPT

## 2025-06-19 PROCEDURE — 76937 US GUIDE VASCULAR ACCESS: CPT

## 2025-06-19 PROCEDURE — C1769: CPT

## 2025-06-19 NOTE — ASU DISCHARGE PLAN (ADULT/PEDIATRIC) - FINANCIAL ASSISTANCE
[facility] provides services at a reduced cost to those who are determined to be eligible through [facility]’s financial assistance program. Information regarding [facility]’s financial assistance program can be found by going to [email or web address] or by calling [phone].

## 2025-06-19 NOTE — H&P ADULT - HISTORY OF PRESENT ILLNESS
Interventional Radiology    HPI: 69y Male with ESRD sp renal transplant in 2012 with course cb PTLD requiring mediport for therapy.      Review of Systems:    Constitutional: No acute complaints  Neurological: AAOx3  Respiratory: No respiratory distress  Cardiovascular: No chest pain  Gastrointestinal: No abdominal pain    Allergies: No Known Allergies    Medications (Abx/Cardiac/Anticoagulation/Blood Products)      Data:    T(C): --  HR: --  BP: --  RR: --  SpO2: --            Physical Exam  General: No acute distress, well appearing  Chest: Non labored breathing  Abdomen: Non-distended, non-tender    RADIOLOGY & ADDITIONAL TESTS:    Imaging Reviewed    Plan: 69y Male presents for mediport placement.   -Risks/Benefits/alternatives explained with the patient and/or healthcare proxy and witnessed informed consent obtained.

## 2025-06-19 NOTE — PROCEDURE NOTE - LOCAL ANESTHESIA
Impression: Exdtve age-rel mclr degn, right eye, with actv chrdl neovas: H35.3211. Right. Condition: unstable. Vision: vision affected. s/p VAB OD #1 04/21/23 w/ Dr. Moira Woods 
s/p EYLEA OD 01/30/22, EYLEA OD 11/21/22, EYLEA OD 09/26/22. .. w/ Dr. Carlos A Guerrero: Discussed diagnosis in detail with patient. Discussed risks of progression with present condition. Based on findings recommend Intravitreal Injection Treatment RIGHT EYE with VABYSMO to help reduce the fluid and prevent a further reduction in vision. Discussed the risks and benefits of tx. All questions answered. Patient elects to proceed with recommendation. OCT and Optos OD shows a decrease in leakage/bleeding. Enrolled in Good Days through [employer]. 1% Lidocaine

## 2025-06-19 NOTE — PRE-ANESTHESIA EVALUATION ADULT - NSANTHPMHFT_GEN_ALL_CORE
PMHx renal transplant (2012) on tacrolimus, s/p left nephrectomy, DLBCL on active chemo, peripheral neuropathy, hypothyroidism, retroperitoneal fibrosis encasing veins, HTN, HLD, GERD, anxiety/depression, ANGELIKA   Recent admission for sepsis 2/2 UTI

## 2025-06-19 NOTE — ASU DISCHARGE PLAN (ADULT/PEDIATRIC) - NURSING INSTRUCTIONS
Please feel free to contact us at [phone] if any problems arise. After 6PM, Monday through Friday, on weekends and on holidays, please call [phone] and ask for the radiology resident on call to be paged.

## 2025-06-20 ENCOUNTER — NON-APPOINTMENT (OUTPATIENT)
Age: 70
End: 2025-06-20

## 2025-06-26 ENCOUNTER — APPOINTMENT (OUTPATIENT)
Dept: HEMATOLOGY ONCOLOGY | Facility: CLINIC | Age: 70
End: 2025-06-26
Payer: MEDICARE

## 2025-06-26 ENCOUNTER — APPOINTMENT (OUTPATIENT)
Dept: INFUSION THERAPY | Facility: HOSPITAL | Age: 70
End: 2025-06-26

## 2025-06-26 ENCOUNTER — RESULT REVIEW (OUTPATIENT)
Age: 70
End: 2025-06-26

## 2025-06-26 ENCOUNTER — APPOINTMENT (OUTPATIENT)
Dept: HEMATOLOGY ONCOLOGY | Facility: CLINIC | Age: 70
End: 2025-06-26

## 2025-06-26 LAB
ALBUMIN SERPL ELPH-MCNC: 4 G/DL — SIGNIFICANT CHANGE UP (ref 3.3–5)
ALP SERPL-CCNC: 92 U/L — SIGNIFICANT CHANGE UP (ref 40–120)
ALT FLD-CCNC: 30 U/L — SIGNIFICANT CHANGE UP (ref 10–45)
ANION GAP SERPL CALC-SCNC: 11 MMOL/L — SIGNIFICANT CHANGE UP (ref 5–17)
AST SERPL-CCNC: 32 U/L — SIGNIFICANT CHANGE UP (ref 10–40)
BASOPHILS # BLD AUTO: 0.07 K/UL — SIGNIFICANT CHANGE UP (ref 0–0.2)
BASOPHILS NFR BLD AUTO: 1.1 % — SIGNIFICANT CHANGE UP (ref 0–2)
BILIRUB SERPL-MCNC: 0.5 MG/DL — SIGNIFICANT CHANGE UP (ref 0.2–1.2)
BUN SERPL-MCNC: 23 MG/DL — SIGNIFICANT CHANGE UP (ref 7–23)
CALCIUM SERPL-MCNC: 9.9 MG/DL — SIGNIFICANT CHANGE UP (ref 8.4–10.5)
CHLORIDE SERPL-SCNC: 100 MMOL/L — SIGNIFICANT CHANGE UP (ref 96–108)
CO2 SERPL-SCNC: 26 MMOL/L — SIGNIFICANT CHANGE UP (ref 22–31)
CREAT SERPL-MCNC: 1.04 MG/DL — SIGNIFICANT CHANGE UP (ref 0.5–1.3)
EGFR: 79 ML/MIN/1.73M2 — SIGNIFICANT CHANGE UP
EGFR: 79 ML/MIN/1.73M2 — SIGNIFICANT CHANGE UP
EOSINOPHIL # BLD AUTO: 0.07 K/UL — SIGNIFICANT CHANGE UP (ref 0–0.5)
EOSINOPHIL NFR BLD AUTO: 1.1 % — SIGNIFICANT CHANGE UP (ref 0–6)
GLUCOSE SERPL-MCNC: 275 MG/DL — HIGH (ref 70–99)
HCT VFR BLD CALC: 33.6 % — LOW (ref 39–50)
HGB BLD-MCNC: 11.3 G/DL — LOW (ref 13–17)
IMM GRANULOCYTES NFR BLD AUTO: 2.9 % — HIGH (ref 0–0.9)
LYMPHOCYTES # BLD AUTO: 0.63 K/UL — LOW (ref 1–3.3)
LYMPHOCYTES # BLD AUTO: 9.8 % — LOW (ref 13–44)
MCHC RBC-ENTMCNC: 32.9 PG — SIGNIFICANT CHANGE UP (ref 27–34)
MCHC RBC-ENTMCNC: 33.6 G/DL — SIGNIFICANT CHANGE UP (ref 32–36)
MCV RBC AUTO: 98 FL — SIGNIFICANT CHANGE UP (ref 80–100)
MONOCYTES # BLD AUTO: 0.47 K/UL — SIGNIFICANT CHANGE UP (ref 0–0.9)
MONOCYTES NFR BLD AUTO: 7.3 % — SIGNIFICANT CHANGE UP (ref 2–14)
NEUTROPHILS # BLD AUTO: 5.02 K/UL — SIGNIFICANT CHANGE UP (ref 1.8–7.4)
NEUTROPHILS NFR BLD AUTO: 77.8 % — HIGH (ref 43–77)
NRBC BLD AUTO-RTO: 0 /100 WBCS — SIGNIFICANT CHANGE UP (ref 0–0)
PLATELET # BLD AUTO: 180 K/UL — SIGNIFICANT CHANGE UP (ref 150–400)
POTASSIUM SERPL-MCNC: 4.5 MMOL/L — SIGNIFICANT CHANGE UP (ref 3.5–5.3)
POTASSIUM SERPL-SCNC: 4.5 MMOL/L — SIGNIFICANT CHANGE UP (ref 3.5–5.3)
PROT SERPL-MCNC: 7.1 G/DL — SIGNIFICANT CHANGE UP (ref 6–8.3)
RBC # BLD: 3.43 M/UL — LOW (ref 4.2–5.8)
RBC # FLD: 14.8 % — HIGH (ref 10.3–14.5)
SODIUM SERPL-SCNC: 136 MMOL/L — SIGNIFICANT CHANGE UP (ref 135–145)
WBC # BLD: 6.45 K/UL — SIGNIFICANT CHANGE UP (ref 3.8–10.5)
WBC # FLD AUTO: 6.45 K/UL — SIGNIFICANT CHANGE UP (ref 3.8–10.5)

## 2025-06-26 PROCEDURE — 99215 OFFICE O/P EST HI 40 MIN: CPT

## 2025-06-27 DIAGNOSIS — D47.Z1 POST-TRANSPLANT LYMPHOPROLIFERATIVE DISORDER (PTLD): ICD-10-CM

## 2025-07-10 RX ORDER — BUSPIRONE HYDROCHLORIDE 5 MG/1
5 TABLET ORAL 3 TIMES DAILY
Refills: 0 | Status: ACTIVE | COMMUNITY
Start: 2025-07-10

## 2025-07-16 ENCOUNTER — OUTPATIENT (OUTPATIENT)
Dept: OUTPATIENT SERVICES | Facility: HOSPITAL | Age: 70
LOS: 1 days | Discharge: HOME | End: 2025-07-16

## 2025-07-16 DIAGNOSIS — C83.30 DIFFUSE LARGE B-CELL LYMPHOMA, UNSPECIFIED SITE: ICD-10-CM

## 2025-07-16 DIAGNOSIS — Z94.0 KIDNEY TRANSPLANT STATUS: Chronic | ICD-10-CM

## 2025-07-16 DIAGNOSIS — Z98.89 OTHER SPECIFIED POSTPROCEDURAL STATES: Chronic | ICD-10-CM

## 2025-07-16 DIAGNOSIS — Z98.41 CATARACT EXTRACTION STATUS, RIGHT EYE: Chronic | ICD-10-CM

## 2025-07-16 DIAGNOSIS — Z90.5 ACQUIRED ABSENCE OF KIDNEY: Chronic | ICD-10-CM

## 2025-07-16 DIAGNOSIS — I77.0 ARTERIOVENOUS FISTULA, ACQUIRED: Chronic | ICD-10-CM

## 2025-07-16 DIAGNOSIS — Z90.49 ACQUIRED ABSENCE OF OTHER SPECIFIED PARTS OF DIGESTIVE TRACT: Chronic | ICD-10-CM

## 2025-07-16 DIAGNOSIS — N13.5 CROSSING VESSEL AND STRICTURE OF URETER WITHOUT HYDRONEPHROSIS: Chronic | ICD-10-CM

## 2025-07-16 DIAGNOSIS — Z98.42 CATARACT EXTRACTION STATUS, LEFT EYE: Chronic | ICD-10-CM

## 2025-07-17 ENCOUNTER — RESULT REVIEW (OUTPATIENT)
Age: 70
End: 2025-07-17

## 2025-07-17 ENCOUNTER — APPOINTMENT (OUTPATIENT)
Dept: HEMATOLOGY ONCOLOGY | Facility: CLINIC | Age: 70
End: 2025-07-17
Payer: MEDICARE

## 2025-07-17 ENCOUNTER — APPOINTMENT (OUTPATIENT)
Dept: INFUSION THERAPY | Facility: HOSPITAL | Age: 70
End: 2025-07-17

## 2025-07-17 VITALS
RESPIRATION RATE: 16 BRPM | BODY MASS INDEX: 24.41 KG/M2 | HEART RATE: 60 BPM | OXYGEN SATURATION: 99 % | TEMPERATURE: 97.2 F | DIASTOLIC BLOOD PRESSURE: 68 MMHG | SYSTOLIC BLOOD PRESSURE: 148 MMHG | WEIGHT: 179.99 LBS

## 2025-07-17 LAB
BASOPHILS # BLD AUTO: 0.1 K/UL — SIGNIFICANT CHANGE UP (ref 0–0.2)
BASOPHILS NFR BLD AUTO: 3 % — HIGH (ref 0–2)
DACRYOCYTES BLD QL SMEAR: SLIGHT — SIGNIFICANT CHANGE UP
ELLIPTOCYTES BLD QL SMEAR: SLIGHT — SIGNIFICANT CHANGE UP
EOSINOPHIL # BLD AUTO: 0.13 K/UL — SIGNIFICANT CHANGE UP (ref 0–0.5)
EOSINOPHIL NFR BLD AUTO: 4 % — SIGNIFICANT CHANGE UP (ref 0–6)
HCT VFR BLD CALC: 29.7 % — LOW (ref 39–50)
HGB BLD-MCNC: 10 G/DL — LOW (ref 13–17)
LYMPHOCYTES # BLD AUTO: 0.48 K/UL — LOW (ref 1–3.3)
LYMPHOCYTES # BLD AUTO: 15 % — SIGNIFICANT CHANGE UP (ref 13–44)
MCHC RBC-ENTMCNC: 32.8 PG — SIGNIFICANT CHANGE UP (ref 27–34)
MCHC RBC-ENTMCNC: 33.7 G/DL — SIGNIFICANT CHANGE UP (ref 32–36)
MCV RBC AUTO: 97.4 FL — SIGNIFICANT CHANGE UP (ref 80–100)
MONOCYTES # BLD AUTO: 0.42 K/UL — SIGNIFICANT CHANGE UP (ref 0–0.9)
MONOCYTES NFR BLD AUTO: 13 % — SIGNIFICANT CHANGE UP (ref 2–14)
NEUTROPHILS # BLD AUTO: 2.09 K/UL — SIGNIFICANT CHANGE UP (ref 1.8–7.4)
NEUTROPHILS NFR BLD AUTO: 65 % — SIGNIFICANT CHANGE UP (ref 43–77)
NRBC # BLD: 0 /100 WBCS — SIGNIFICANT CHANGE UP (ref 0–0)
NRBC BLD AUTO-RTO: SIGNIFICANT CHANGE UP /100 WBCS (ref 0–0)
NRBC BLD-RTO: 0 /100 WBCS — SIGNIFICANT CHANGE UP (ref 0–0)
PLAT MORPH BLD: NORMAL — SIGNIFICANT CHANGE UP
PLATELET # BLD AUTO: 154 K/UL — SIGNIFICANT CHANGE UP (ref 150–400)
POIKILOCYTOSIS BLD QL AUTO: SLIGHT — SIGNIFICANT CHANGE UP
RBC # BLD: 3.05 M/UL — LOW (ref 4.2–5.8)
RBC # FLD: 14.4 % — SIGNIFICANT CHANGE UP (ref 10.3–14.5)
RBC BLD AUTO: ABNORMAL
WBC # BLD: 3.22 K/UL — LOW (ref 3.8–10.5)
WBC # FLD AUTO: 3.22 K/UL — LOW (ref 3.8–10.5)

## 2025-07-17 PROCEDURE — 99215 OFFICE O/P EST HI 40 MIN: CPT

## 2025-07-27 ENCOUNTER — INPATIENT (INPATIENT)
Facility: HOSPITAL | Age: 70
LOS: 2 days | Discharge: NURSING, REHAB OR LONG-TERM CARE | DRG: 690 | End: 2025-07-30
Attending: FAMILY MEDICINE | Admitting: GENERAL PRACTICE
Payer: MEDICARE

## 2025-07-27 VITALS
HEIGHT: 72 IN | DIASTOLIC BLOOD PRESSURE: 69 MMHG | WEIGHT: 184.97 LBS | HEART RATE: 63 BPM | SYSTOLIC BLOOD PRESSURE: 171 MMHG | OXYGEN SATURATION: 96 % | RESPIRATION RATE: 18 BRPM | TEMPERATURE: 98 F

## 2025-07-27 DIAGNOSIS — N12 TUBULO-INTERSTITIAL NEPHRITIS, NOT SPECIFIED AS ACUTE OR CHRONIC: ICD-10-CM

## 2025-07-27 DIAGNOSIS — I77.0 ARTERIOVENOUS FISTULA, ACQUIRED: Chronic | ICD-10-CM

## 2025-07-27 DIAGNOSIS — Z94.0 KIDNEY TRANSPLANT STATUS: Chronic | ICD-10-CM

## 2025-07-27 DIAGNOSIS — E78.5 HYPERLIPIDEMIA, UNSPECIFIED: ICD-10-CM

## 2025-07-27 DIAGNOSIS — Z29.9 ENCOUNTER FOR PROPHYLACTIC MEASURES, UNSPECIFIED: ICD-10-CM

## 2025-07-27 DIAGNOSIS — K52.89 OTHER SPECIFIED NONINFECTIVE GASTROENTERITIS AND COLITIS: ICD-10-CM

## 2025-07-27 DIAGNOSIS — Z98.89 OTHER SPECIFIED POSTPROCEDURAL STATES: Chronic | ICD-10-CM

## 2025-07-27 DIAGNOSIS — K59.00 CONSTIPATION, UNSPECIFIED: ICD-10-CM

## 2025-07-27 DIAGNOSIS — K21.9 GASTRO-ESOPHAGEAL REFLUX DISEASE WITHOUT ESOPHAGITIS: ICD-10-CM

## 2025-07-27 DIAGNOSIS — E03.9 HYPOTHYROIDISM, UNSPECIFIED: ICD-10-CM

## 2025-07-27 DIAGNOSIS — Z90.5 ACQUIRED ABSENCE OF KIDNEY: Chronic | ICD-10-CM

## 2025-07-27 DIAGNOSIS — Z98.42 CATARACT EXTRACTION STATUS, LEFT EYE: Chronic | ICD-10-CM

## 2025-07-27 DIAGNOSIS — Z90.49 ACQUIRED ABSENCE OF OTHER SPECIFIED PARTS OF DIGESTIVE TRACT: Chronic | ICD-10-CM

## 2025-07-27 DIAGNOSIS — F32.9 MAJOR DEPRESSIVE DISORDER, SINGLE EPISODE, UNSPECIFIED: ICD-10-CM

## 2025-07-27 DIAGNOSIS — I10 ESSENTIAL (PRIMARY) HYPERTENSION: ICD-10-CM

## 2025-07-27 DIAGNOSIS — T86.19 OTHER COMPLICATION OF KIDNEY TRANSPLANT: ICD-10-CM

## 2025-07-27 DIAGNOSIS — Z98.41 CATARACT EXTRACTION STATUS, RIGHT EYE: Chronic | ICD-10-CM

## 2025-07-27 DIAGNOSIS — N13.5 CROSSING VESSEL AND STRICTURE OF URETER WITHOUT HYDRONEPHROSIS: Chronic | ICD-10-CM

## 2025-07-27 DIAGNOSIS — F41.1 GENERALIZED ANXIETY DISORDER: ICD-10-CM

## 2025-07-27 DIAGNOSIS — N18.6 END STAGE RENAL DISEASE: ICD-10-CM

## 2025-07-27 DIAGNOSIS — E11.9 TYPE 2 DIABETES MELLITUS WITHOUT COMPLICATIONS: ICD-10-CM

## 2025-07-27 LAB
ALBUMIN SERPL ELPH-MCNC: 2.8 G/DL — LOW (ref 3.3–5)
ALP SERPL-CCNC: 88 U/L — SIGNIFICANT CHANGE UP (ref 40–120)
ALT FLD-CCNC: 27 U/L — SIGNIFICANT CHANGE UP (ref 12–78)
ANION GAP SERPL CALC-SCNC: 4 MMOL/L — LOW (ref 5–17)
APPEARANCE UR: ABNORMAL
AST SERPL-CCNC: 10 U/L — LOW (ref 15–37)
BACTERIA # UR AUTO: ABNORMAL /HPF
BASOPHILS # BLD AUTO: 0.02 K/UL — SIGNIFICANT CHANGE UP (ref 0–0.2)
BASOPHILS NFR BLD AUTO: 0.4 % — SIGNIFICANT CHANGE UP (ref 0–2)
BILIRUB SERPL-MCNC: 0.3 MG/DL — SIGNIFICANT CHANGE UP (ref 0.2–1.2)
BILIRUB UR-MCNC: NEGATIVE — SIGNIFICANT CHANGE UP
BUN SERPL-MCNC: 29 MG/DL — HIGH (ref 7–23)
CALCIUM SERPL-MCNC: 9.4 MG/DL — SIGNIFICANT CHANGE UP (ref 8.5–10.1)
CHLORIDE SERPL-SCNC: 100 MMOL/L — SIGNIFICANT CHANGE UP (ref 96–108)
CO2 SERPL-SCNC: 30 MMOL/L — SIGNIFICANT CHANGE UP (ref 22–31)
COLOR SPEC: YELLOW — SIGNIFICANT CHANGE UP
CREAT SERPL-MCNC: 1.2 MG/DL — SIGNIFICANT CHANGE UP (ref 0.5–1.3)
DIFF PNL FLD: NEGATIVE — SIGNIFICANT CHANGE UP
EGFR: 66 ML/MIN/1.73M2 — SIGNIFICANT CHANGE UP
EGFR: 66 ML/MIN/1.73M2 — SIGNIFICANT CHANGE UP
EOSINOPHIL # BLD AUTO: 0.05 K/UL — SIGNIFICANT CHANGE UP (ref 0–0.5)
EOSINOPHIL NFR BLD AUTO: 0.9 % — SIGNIFICANT CHANGE UP (ref 0–6)
EPI CELLS # UR: PRESENT
GLUCOSE BLDC GLUCOMTR-MCNC: 134 MG/DL — HIGH (ref 70–99)
GLUCOSE BLDC GLUCOMTR-MCNC: 222 MG/DL — HIGH (ref 70–99)
GLUCOSE BLDC GLUCOMTR-MCNC: 67 MG/DL — LOW (ref 70–99)
GLUCOSE BLDC GLUCOMTR-MCNC: 67 MG/DL — LOW (ref 70–99)
GLUCOSE BLDC GLUCOMTR-MCNC: 98 MG/DL — SIGNIFICANT CHANGE UP (ref 70–99)
GLUCOSE SERPL-MCNC: 451 MG/DL — CRITICAL HIGH (ref 70–99)
GLUCOSE UR QL: >=1000 MG/DL
HCT VFR BLD CALC: 30.1 % — LOW (ref 39–50)
HGB BLD-MCNC: 10.1 G/DL — LOW (ref 13–17)
IMM GRANULOCYTES # BLD AUTO: 0.02 K/UL — SIGNIFICANT CHANGE UP (ref 0–0.07)
IMM GRANULOCYTES NFR BLD AUTO: 0.4 % — SIGNIFICANT CHANGE UP (ref 0–0.9)
KETONES UR QL: NEGATIVE MG/DL — SIGNIFICANT CHANGE UP
LEUKOCYTE ESTERASE UR-ACNC: ABNORMAL
LIDOCAIN IGE QN: 13 U/L — SIGNIFICANT CHANGE UP (ref 13–75)
LYMPHOCYTES # BLD AUTO: 0.57 K/UL — LOW (ref 1–3.3)
LYMPHOCYTES NFR BLD AUTO: 10.7 % — LOW (ref 13–44)
MCHC RBC-ENTMCNC: 32.8 PG — SIGNIFICANT CHANGE UP (ref 27–34)
MCHC RBC-ENTMCNC: 33.6 G/DL — SIGNIFICANT CHANGE UP (ref 32–36)
MCV RBC AUTO: 97.7 FL — SIGNIFICANT CHANGE UP (ref 80–100)
MONOCYTES # BLD AUTO: 0.61 K/UL — SIGNIFICANT CHANGE UP (ref 0–0.9)
MONOCYTES NFR BLD AUTO: 11.4 % — SIGNIFICANT CHANGE UP (ref 2–14)
NEUTROPHILS # BLD AUTO: 4.08 K/UL — SIGNIFICANT CHANGE UP (ref 1.8–7.4)
NEUTROPHILS NFR BLD AUTO: 76.2 % — SIGNIFICANT CHANGE UP (ref 43–77)
NITRITE UR-MCNC: NEGATIVE — SIGNIFICANT CHANGE UP
NRBC # BLD AUTO: 0 K/UL — SIGNIFICANT CHANGE UP (ref 0–0)
NRBC # FLD: 0 K/UL — SIGNIFICANT CHANGE UP (ref 0–0)
NRBC BLD AUTO-RTO: 0 /100 WBCS — SIGNIFICANT CHANGE UP (ref 0–0)
PH UR: 6 — SIGNIFICANT CHANGE UP (ref 5–8)
PLATELET # BLD AUTO: 175 K/UL — SIGNIFICANT CHANGE UP (ref 150–400)
PMV BLD: 9.2 FL — SIGNIFICANT CHANGE UP (ref 7–13)
POTASSIUM SERPL-MCNC: 4.4 MMOL/L — SIGNIFICANT CHANGE UP (ref 3.5–5.3)
POTASSIUM SERPL-SCNC: 4.4 MMOL/L — SIGNIFICANT CHANGE UP (ref 3.5–5.3)
PROCALCITONIN SERPL-MCNC: 0.08 NG/ML — SIGNIFICANT CHANGE UP
PROT SERPL-MCNC: 6 G/DL — SIGNIFICANT CHANGE UP (ref 6–8.3)
PROT UR-MCNC: NEGATIVE MG/DL — SIGNIFICANT CHANGE UP
RBC # BLD: 3.08 M/UL — LOW (ref 4.2–5.8)
RBC # FLD: 13.9 % — SIGNIFICANT CHANGE UP (ref 10.3–14.5)
RBC CASTS # UR COMP ASSIST: SIGNIFICANT CHANGE UP /HPF (ref 0–4)
SODIUM SERPL-SCNC: 134 MMOL/L — LOW (ref 135–145)
SP GR SPEC: 1.02 — SIGNIFICANT CHANGE UP (ref 1–1.03)
UROBILINOGEN FLD QL: 0.2 MG/DL — SIGNIFICANT CHANGE UP (ref 0.2–1)
WBC # BLD: 5.35 K/UL — SIGNIFICANT CHANGE UP (ref 3.8–10.5)
WBC # FLD AUTO: 5.35 K/UL — SIGNIFICANT CHANGE UP (ref 3.8–10.5)
WBC UR QL: >50 /HPF — SIGNIFICANT CHANGE UP (ref 0–5)

## 2025-07-27 PROCEDURE — 83690 ASSAY OF LIPASE: CPT

## 2025-07-27 PROCEDURE — 87040 BLOOD CULTURE FOR BACTERIA: CPT

## 2025-07-27 PROCEDURE — 80053 COMPREHEN METABOLIC PANEL: CPT

## 2025-07-27 PROCEDURE — 74176 CT ABD & PELVIS W/O CONTRAST: CPT | Mod: 26

## 2025-07-27 PROCEDURE — 99223 1ST HOSP IP/OBS HIGH 75: CPT

## 2025-07-27 PROCEDURE — 84145 PROCALCITONIN (PCT): CPT

## 2025-07-27 PROCEDURE — 81001 URINALYSIS AUTO W/SCOPE: CPT

## 2025-07-27 PROCEDURE — 99285 EMERGENCY DEPT VISIT HI MDM: CPT

## 2025-07-27 PROCEDURE — 82962 GLUCOSE BLOOD TEST: CPT

## 2025-07-27 PROCEDURE — 87086 URINE CULTURE/COLONY COUNT: CPT

## 2025-07-27 PROCEDURE — 85025 COMPLETE CBC W/AUTO DIFF WBC: CPT

## 2025-07-27 PROCEDURE — 74176 CT ABD & PELVIS W/O CONTRAST: CPT

## 2025-07-27 PROCEDURE — 36415 COLL VENOUS BLD VENIPUNCTURE: CPT

## 2025-07-27 RX ORDER — SODIUM CHLORIDE 9 G/1000ML
1000 INJECTION, SOLUTION INTRAVENOUS
Refills: 0 | Status: DISCONTINUED | OUTPATIENT
Start: 2025-07-27 | End: 2025-07-30

## 2025-07-27 RX ORDER — DEXTROSE 50 % IN WATER 50 %
25 SYRINGE (ML) INTRAVENOUS ONCE
Refills: 0 | Status: DISCONTINUED | OUTPATIENT
Start: 2025-07-27 | End: 2025-07-30

## 2025-07-27 RX ORDER — SULFAMETHOXAZOLE/TRIMETHOPRIM 800-160 MG
1 TABLET ORAL DAILY
Refills: 0 | Status: DISCONTINUED | OUTPATIENT
Start: 2025-07-27 | End: 2025-07-30

## 2025-07-27 RX ORDER — BUPROPION HYDROBROMIDE 522 MG/1
150 TABLET, EXTENDED RELEASE ORAL DAILY
Refills: 0 | Status: DISCONTINUED | OUTPATIENT
Start: 2025-07-27 | End: 2025-07-30

## 2025-07-27 RX ORDER — TRAZODONE HCL 100 MG
0 TABLET ORAL
Refills: 0 | DISCHARGE

## 2025-07-27 RX ORDER — IOHEXOL 350 MG/ML
30 INJECTION, SOLUTION INTRAVENOUS ONCE
Refills: 0 | Status: COMPLETED | OUTPATIENT
Start: 2025-07-27 | End: 2025-07-27

## 2025-07-27 RX ORDER — INSULIN GLARGINE-YFGN 100 [IU]/ML
14 INJECTION, SOLUTION SUBCUTANEOUS
Refills: 0 | Status: DISCONTINUED | OUTPATIENT
Start: 2025-07-28 | End: 2025-07-28

## 2025-07-27 RX ORDER — GLUCAGON 3 MG/1
1 POWDER NASAL ONCE
Refills: 0 | Status: DISCONTINUED | OUTPATIENT
Start: 2025-07-27 | End: 2025-07-30

## 2025-07-27 RX ORDER — TACROLIMUS 0.5 MG/1
0.5 CAPSULE ORAL
Refills: 0 | Status: DISCONTINUED | OUTPATIENT
Start: 2025-07-27 | End: 2025-07-29

## 2025-07-27 RX ORDER — DEXTROSE 50 % IN WATER 50 %
12.5 SYRINGE (ML) INTRAVENOUS ONCE
Refills: 0 | Status: DISCONTINUED | OUTPATIENT
Start: 2025-07-27 | End: 2025-07-30

## 2025-07-27 RX ORDER — NYSTATIN 100000 [USP'U]/G
1 CREAM TOPICAL
Refills: 0 | Status: DISCONTINUED | OUTPATIENT
Start: 2025-07-27 | End: 2025-07-28

## 2025-07-27 RX ORDER — DEXTROSE 50 % IN WATER 50 %
15 SYRINGE (ML) INTRAVENOUS ONCE
Refills: 0 | Status: DISCONTINUED | OUTPATIENT
Start: 2025-07-27 | End: 2025-07-30

## 2025-07-27 RX ORDER — CEFTRIAXONE 500 MG/1
1000 INJECTION, POWDER, FOR SOLUTION INTRAMUSCULAR; INTRAVENOUS EVERY 24 HOURS
Refills: 0 | Status: DISCONTINUED | OUTPATIENT
Start: 2025-07-28 | End: 2025-07-28

## 2025-07-27 RX ORDER — ESCITALOPRAM OXALATE 20 MG/1
5 TABLET ORAL
Refills: 0 | Status: DISCONTINUED | OUTPATIENT
Start: 2025-07-27 | End: 2025-07-30

## 2025-07-27 RX ORDER — LACTULOSE 10 G/15ML
30 SOLUTION ORAL
Refills: 0 | DISCHARGE

## 2025-07-27 RX ORDER — INSULIN LISPRO 100 U/ML
10 INJECTION, SOLUTION INTRAVENOUS; SUBCUTANEOUS
Refills: 0 | Status: DISCONTINUED | OUTPATIENT
Start: 2025-07-27 | End: 2025-07-27

## 2025-07-27 RX ORDER — MELATONIN 5 MG
3 TABLET ORAL AT BEDTIME
Refills: 0 | Status: DISCONTINUED | OUTPATIENT
Start: 2025-07-27 | End: 2025-07-30

## 2025-07-27 RX ORDER — INSULIN GLARGINE-YFGN 100 [IU]/ML
14 INJECTION, SOLUTION SUBCUTANEOUS
Refills: 0 | Status: DISCONTINUED | OUTPATIENT
Start: 2025-07-27 | End: 2025-07-27

## 2025-07-27 RX ORDER — BUSPIRONE HYDROCHLORIDE 15 MG/1
5 TABLET ORAL
Refills: 0 | Status: DISCONTINUED | OUTPATIENT
Start: 2025-07-27 | End: 2025-07-30

## 2025-07-27 RX ORDER — CEFTRIAXONE 500 MG/1
1000 INJECTION, POWDER, FOR SOLUTION INTRAMUSCULAR; INTRAVENOUS ONCE
Refills: 0 | Status: COMPLETED | OUTPATIENT
Start: 2025-07-27 | End: 2025-07-27

## 2025-07-27 RX ORDER — ACETAMINOPHEN 500 MG/5ML
650 LIQUID (ML) ORAL EVERY 6 HOURS
Refills: 0 | Status: DISCONTINUED | OUTPATIENT
Start: 2025-07-27 | End: 2025-07-30

## 2025-07-27 RX ORDER — POLYETHYLENE GLYCOL 3350 17 G/17G
17 POWDER, FOR SOLUTION ORAL
Refills: 0 | Status: DISCONTINUED | OUTPATIENT
Start: 2025-07-27 | End: 2025-07-30

## 2025-07-27 RX ORDER — PREDNISONE 20 MG/1
5 TABLET ORAL DAILY
Refills: 0 | Status: DISCONTINUED | OUTPATIENT
Start: 2025-07-27 | End: 2025-07-30

## 2025-07-27 RX ORDER — INSULIN LISPRO 100 U/ML
INJECTION, SOLUTION INTRAVENOUS; SUBCUTANEOUS
Refills: 0 | Status: DISCONTINUED | OUTPATIENT
Start: 2025-07-27 | End: 2025-07-30

## 2025-07-27 RX ORDER — ROSUVASTATIN CALCIUM 20 MG/1
10 TABLET, FILM COATED ORAL AT BEDTIME
Refills: 0 | Status: DISCONTINUED | OUTPATIENT
Start: 2025-07-27 | End: 2025-07-30

## 2025-07-27 RX ORDER — ACETYLCYSTEINE 200 MG/ML
13 INHALANT RESPIRATORY (INHALATION) ONCE
Refills: 0 | Status: DISCONTINUED | OUTPATIENT
Start: 2025-07-27 | End: 2025-07-27

## 2025-07-27 RX ORDER — TRAZODONE HCL 100 MG
50 TABLET ORAL AT BEDTIME
Refills: 0 | Status: DISCONTINUED | OUTPATIENT
Start: 2025-07-27 | End: 2025-07-30

## 2025-07-27 RX ORDER — LACTULOSE 10 G/15ML
20 SOLUTION ORAL THREE TIMES A DAY
Refills: 0 | Status: DISCONTINUED | OUTPATIENT
Start: 2025-07-27 | End: 2025-07-30

## 2025-07-27 RX ORDER — ACETAMINOPHEN 500 MG/5ML
1000 LIQUID (ML) ORAL ONCE
Refills: 0 | Status: COMPLETED | OUTPATIENT
Start: 2025-07-27 | End: 2025-07-27

## 2025-07-27 RX ORDER — BISACODYL 5 MG
10 TABLET, DELAYED RELEASE (ENTERIC COATED) ORAL DAILY
Refills: 0 | Status: DISCONTINUED | OUTPATIENT
Start: 2025-07-27 | End: 2025-07-30

## 2025-07-27 RX ORDER — LEVOTHYROXINE SODIUM 300 MCG
88 TABLET ORAL DAILY
Refills: 0 | Status: DISCONTINUED | OUTPATIENT
Start: 2025-07-27 | End: 2025-07-30

## 2025-07-27 RX ORDER — ENOXAPARIN SODIUM 100 MG/ML
40 INJECTION SUBCUTANEOUS EVERY 24 HOURS
Refills: 0 | Status: DISCONTINUED | OUTPATIENT
Start: 2025-07-27 | End: 2025-07-30

## 2025-07-27 RX ORDER — NYSTATIN 100000 [USP'U]/G
1 CREAM TOPICAL ONCE
Refills: 0 | Status: COMPLETED | OUTPATIENT
Start: 2025-07-27 | End: 2025-07-27

## 2025-07-27 RX ORDER — CARVEDILOL 3.12 MG/1
12.5 TABLET, FILM COATED ORAL EVERY 12 HOURS
Refills: 0 | Status: DISCONTINUED | OUTPATIENT
Start: 2025-07-27 | End: 2025-07-30

## 2025-07-27 RX ORDER — INSULIN LISPRO 100 U/ML
INJECTION, SOLUTION INTRAVENOUS; SUBCUTANEOUS AT BEDTIME
Refills: 0 | Status: DISCONTINUED | OUTPATIENT
Start: 2025-07-27 | End: 2025-07-30

## 2025-07-27 RX ORDER — BUSPIRONE HYDROCHLORIDE 15 MG/1
1 TABLET ORAL
Refills: 0 | DISCHARGE

## 2025-07-27 RX ORDER — SENNA 187 MG
2 TABLET ORAL AT BEDTIME
Refills: 0 | Status: DISCONTINUED | OUTPATIENT
Start: 2025-07-27 | End: 2025-07-30

## 2025-07-27 RX ADMIN — Medication 1 TABLET(S): at 17:06

## 2025-07-27 RX ADMIN — NYSTATIN 1 APPLICATION(S): 100000 CREAM TOPICAL at 07:39

## 2025-07-27 RX ADMIN — Medication 3 MILLIGRAM(S): at 21:27

## 2025-07-27 RX ADMIN — Medication 50 MILLIGRAM(S): at 21:28

## 2025-07-27 RX ADMIN — Medication 12: at 09:40

## 2025-07-27 RX ADMIN — PREDNISONE 5 MILLIGRAM(S): 20 TABLET ORAL at 17:06

## 2025-07-27 RX ADMIN — Medication 100 MILLIGRAM(S): at 21:27

## 2025-07-27 RX ADMIN — CARVEDILOL 12.5 MILLIGRAM(S): 3.12 TABLET, FILM COATED ORAL at 17:06

## 2025-07-27 RX ADMIN — IOHEXOL 30 MILLILITER(S): 350 INJECTION, SOLUTION INTRAVENOUS at 07:38

## 2025-07-27 RX ADMIN — POLYETHYLENE GLYCOL 3350 17 GRAM(S): 17 POWDER, FOR SOLUTION ORAL at 17:06

## 2025-07-27 RX ADMIN — NYSTATIN 1 APPLICATION(S): 100000 CREAM TOPICAL at 17:11

## 2025-07-27 RX ADMIN — CEFTRIAXONE 100 MILLIGRAM(S): 500 INJECTION, POWDER, FOR SOLUTION INTRAMUSCULAR; INTRAVENOUS at 10:31

## 2025-07-27 RX ADMIN — Medication 650 MILLIGRAM(S): at 20:35

## 2025-07-27 RX ADMIN — Medication 2 TABLET(S): at 21:26

## 2025-07-27 RX ADMIN — CEFTRIAXONE 1000 MILLIGRAM(S): 500 INJECTION, POWDER, FOR SOLUTION INTRAMUSCULAR; INTRAVENOUS at 11:05

## 2025-07-27 RX ADMIN — Medication 400 MILLIGRAM(S): at 07:38

## 2025-07-27 RX ADMIN — Medication 1000 MILLIGRAM(S): at 09:41

## 2025-07-27 RX ADMIN — Medication 650 MILLIGRAM(S): at 21:10

## 2025-07-27 RX ADMIN — ROSUVASTATIN CALCIUM 10 MILLIGRAM(S): 20 TABLET, FILM COATED ORAL at 21:28

## 2025-07-27 RX ADMIN — Medication 0.1 MILLIGRAM(S): at 21:27

## 2025-07-27 RX ADMIN — BUSPIRONE HYDROCHLORIDE 5 MILLIGRAM(S): 15 TABLET ORAL at 20:36

## 2025-07-27 RX ADMIN — Medication 500 MILLIGRAM(S): at 17:06

## 2025-07-27 RX ADMIN — ENOXAPARIN SODIUM 40 MILLIGRAM(S): 100 INJECTION SUBCUTANEOUS at 17:06

## 2025-07-27 RX ADMIN — INSULIN LISPRO 10 UNIT(S): 100 INJECTION, SOLUTION INTRAVENOUS; SUBCUTANEOUS at 17:07

## 2025-07-27 RX ADMIN — LACTULOSE 20 GRAM(S): 10 SOLUTION ORAL at 15:44

## 2025-07-27 RX ADMIN — LACTULOSE 20 GRAM(S): 10 SOLUTION ORAL at 21:28

## 2025-07-27 RX ADMIN — TACROLIMUS 0.5 MILLIGRAM(S): 0.5 CAPSULE ORAL at 20:35

## 2025-07-28 DIAGNOSIS — E11.65 TYPE 2 DIABETES MELLITUS WITH HYPERGLYCEMIA: ICD-10-CM

## 2025-07-28 DIAGNOSIS — Z94.0 KIDNEY TRANSPLANT STATUS: ICD-10-CM

## 2025-07-28 LAB
A1C WITH ESTIMATED AVERAGE GLUCOSE RESULT: 9.5 % — HIGH (ref 4–5.6)
ALBUMIN SERPL ELPH-MCNC: 2.8 G/DL — LOW (ref 3.3–5)
ALP SERPL-CCNC: 94 U/L — SIGNIFICANT CHANGE UP (ref 40–120)
ALT FLD-CCNC: 32 U/L — SIGNIFICANT CHANGE UP (ref 12–78)
ANION GAP SERPL CALC-SCNC: 5 MMOL/L — SIGNIFICANT CHANGE UP (ref 5–17)
AST SERPL-CCNC: 20 U/L — SIGNIFICANT CHANGE UP (ref 15–37)
BASOPHILS # BLD AUTO: 0.04 K/UL — SIGNIFICANT CHANGE UP (ref 0–0.2)
BASOPHILS NFR BLD AUTO: 0.7 % — SIGNIFICANT CHANGE UP (ref 0–2)
BILIRUB SERPL-MCNC: 0.5 MG/DL — SIGNIFICANT CHANGE UP (ref 0.2–1.2)
BUN SERPL-MCNC: 23 MG/DL — SIGNIFICANT CHANGE UP (ref 7–23)
CALCIUM SERPL-MCNC: 9.7 MG/DL — SIGNIFICANT CHANGE UP (ref 8.5–10.1)
CHLORIDE SERPL-SCNC: 104 MMOL/L — SIGNIFICANT CHANGE UP (ref 96–108)
CO2 SERPL-SCNC: 26 MMOL/L — SIGNIFICANT CHANGE UP (ref 22–31)
CREAT SERPL-MCNC: 0.99 MG/DL — SIGNIFICANT CHANGE UP (ref 0.5–1.3)
CULTURE RESULTS: ABNORMAL
EGFR: 83 ML/MIN/1.73M2 — SIGNIFICANT CHANGE UP
EGFR: 83 ML/MIN/1.73M2 — SIGNIFICANT CHANGE UP
EOSINOPHIL # BLD AUTO: 0.06 K/UL — SIGNIFICANT CHANGE UP (ref 0–0.5)
EOSINOPHIL NFR BLD AUTO: 1 % — SIGNIFICANT CHANGE UP (ref 0–6)
ESTIMATED AVERAGE GLUCOSE: 226 MG/DL — HIGH (ref 68–114)
GLUCOSE BLDC GLUCOMTR-MCNC: 242 MG/DL — HIGH (ref 70–99)
GLUCOSE BLDC GLUCOMTR-MCNC: 350 MG/DL — HIGH (ref 70–99)
GLUCOSE BLDC GLUCOMTR-MCNC: 405 MG/DL — HIGH (ref 70–99)
GLUCOSE SERPL-MCNC: 346 MG/DL — HIGH (ref 70–99)
HCT VFR BLD CALC: 33.8 % — LOW (ref 39–50)
HGB BLD-MCNC: 11.1 G/DL — LOW (ref 13–17)
IMM GRANULOCYTES # BLD AUTO: 0.03 K/UL — SIGNIFICANT CHANGE UP (ref 0–0.07)
IMM GRANULOCYTES NFR BLD AUTO: 0.5 % — SIGNIFICANT CHANGE UP (ref 0–0.9)
LYMPHOCYTES # BLD AUTO: 0.43 K/UL — LOW (ref 1–3.3)
LYMPHOCYTES NFR BLD AUTO: 7.4 % — LOW (ref 13–44)
MCHC RBC-ENTMCNC: 32.6 PG — SIGNIFICANT CHANGE UP (ref 27–34)
MCHC RBC-ENTMCNC: 32.8 G/DL — SIGNIFICANT CHANGE UP (ref 32–36)
MCV RBC AUTO: 99.4 FL — SIGNIFICANT CHANGE UP (ref 80–100)
MONOCYTES # BLD AUTO: 0.51 K/UL — SIGNIFICANT CHANGE UP (ref 0–0.9)
MONOCYTES NFR BLD AUTO: 8.8 % — SIGNIFICANT CHANGE UP (ref 2–14)
NEUTROPHILS # BLD AUTO: 4.71 K/UL — SIGNIFICANT CHANGE UP (ref 1.8–7.4)
NEUTROPHILS NFR BLD AUTO: 81.6 % — HIGH (ref 43–77)
NRBC # BLD AUTO: 0 K/UL — SIGNIFICANT CHANGE UP (ref 0–0)
NRBC # FLD: 0 K/UL — SIGNIFICANT CHANGE UP (ref 0–0)
NRBC BLD AUTO-RTO: 0 /100 WBCS — SIGNIFICANT CHANGE UP (ref 0–0)
PLATELET # BLD AUTO: 139 K/UL — LOW (ref 150–400)
PMV BLD: 8.9 FL — SIGNIFICANT CHANGE UP (ref 7–13)
POTASSIUM SERPL-MCNC: 4.5 MMOL/L — SIGNIFICANT CHANGE UP (ref 3.5–5.3)
POTASSIUM SERPL-SCNC: 4.5 MMOL/L — SIGNIFICANT CHANGE UP (ref 3.5–5.3)
PROT SERPL-MCNC: 6.2 G/DL — SIGNIFICANT CHANGE UP (ref 6–8.3)
RBC # BLD: 3.4 M/UL — LOW (ref 4.2–5.8)
RBC # FLD: 14.1 % — SIGNIFICANT CHANGE UP (ref 10.3–14.5)
SODIUM SERPL-SCNC: 135 MMOL/L — SIGNIFICANT CHANGE UP (ref 135–145)
SPECIMEN SOURCE: SIGNIFICANT CHANGE UP
TSH SERPL-MCNC: 0.64 UIU/ML — SIGNIFICANT CHANGE UP (ref 0.36–3.74)
WBC # BLD: 5.78 K/UL — SIGNIFICANT CHANGE UP (ref 3.8–10.5)
WBC # FLD AUTO: 5.78 K/UL — SIGNIFICANT CHANGE UP (ref 3.8–10.5)

## 2025-07-28 PROCEDURE — 99221 1ST HOSP IP/OBS SF/LOW 40: CPT

## 2025-07-28 PROCEDURE — 36415 COLL VENOUS BLD VENIPUNCTURE: CPT

## 2025-07-28 PROCEDURE — 99233 SBSQ HOSP IP/OBS HIGH 50: CPT

## 2025-07-28 PROCEDURE — 82962 GLUCOSE BLOOD TEST: CPT

## 2025-07-28 PROCEDURE — 87040 BLOOD CULTURE FOR BACTERIA: CPT

## 2025-07-28 PROCEDURE — 97162 PT EVAL MOD COMPLEX 30 MIN: CPT

## 2025-07-28 PROCEDURE — 99222 1ST HOSP IP/OBS MODERATE 55: CPT

## 2025-07-28 PROCEDURE — 84145 PROCALCITONIN (PCT): CPT

## 2025-07-28 PROCEDURE — 85025 COMPLETE CBC W/AUTO DIFF WBC: CPT

## 2025-07-28 PROCEDURE — 87086 URINE CULTURE/COLONY COUNT: CPT

## 2025-07-28 PROCEDURE — 74176 CT ABD & PELVIS W/O CONTRAST: CPT

## 2025-07-28 PROCEDURE — 83690 ASSAY OF LIPASE: CPT

## 2025-07-28 PROCEDURE — 84443 ASSAY THYROID STIM HORMONE: CPT

## 2025-07-28 PROCEDURE — G0545: CPT

## 2025-07-28 PROCEDURE — 83036 HEMOGLOBIN GLYCOSYLATED A1C: CPT

## 2025-07-28 PROCEDURE — 81001 URINALYSIS AUTO W/SCOPE: CPT

## 2025-07-28 PROCEDURE — 87077 CULTURE AEROBIC IDENTIFY: CPT

## 2025-07-28 PROCEDURE — 80053 COMPREHEN METABOLIC PANEL: CPT

## 2025-07-28 RX ORDER — INSULIN GLARGINE-YFGN 100 [IU]/ML
14 INJECTION, SOLUTION SUBCUTANEOUS AT BEDTIME
Refills: 0 | Status: DISCONTINUED | OUTPATIENT
Start: 2025-07-28 | End: 2025-07-28

## 2025-07-28 RX ORDER — INSULIN GLARGINE-YFGN 100 [IU]/ML
14 INJECTION, SOLUTION SUBCUTANEOUS EVERY MORNING
Refills: 0 | Status: DISCONTINUED | OUTPATIENT
Start: 2025-07-29 | End: 2025-07-30

## 2025-07-28 RX ORDER — INSULIN LISPRO 100 U/ML
8 INJECTION, SOLUTION INTRAVENOUS; SUBCUTANEOUS
Refills: 0 | Status: DISCONTINUED | OUTPATIENT
Start: 2025-07-28 | End: 2025-07-28

## 2025-07-28 RX ORDER — NYSTATIN 100000 [USP'U]/G
1 CREAM TOPICAL THREE TIMES A DAY
Refills: 0 | Status: DISCONTINUED | OUTPATIENT
Start: 2025-07-28 | End: 2025-07-30

## 2025-07-28 RX ORDER — ERTAPENEM SODIUM 1 G/1
1000 INJECTION, POWDER, LYOPHILIZED, FOR SOLUTION INTRAMUSCULAR; INTRAVENOUS ONCE
Refills: 0 | Status: COMPLETED | OUTPATIENT
Start: 2025-07-28 | End: 2025-07-28

## 2025-07-28 RX ORDER — INSULIN LISPRO 100 U/ML
4 INJECTION, SOLUTION INTRAVENOUS; SUBCUTANEOUS
Refills: 0 | Status: DISCONTINUED | OUTPATIENT
Start: 2025-07-28 | End: 2025-07-30

## 2025-07-28 RX ORDER — ERTAPENEM SODIUM 1 G/1
1000 INJECTION, POWDER, LYOPHILIZED, FOR SOLUTION INTRAMUSCULAR; INTRAVENOUS EVERY 24 HOURS
Refills: 0 | Status: DISCONTINUED | OUTPATIENT
Start: 2025-07-29 | End: 2025-07-29

## 2025-07-28 RX ORDER — ERTAPENEM SODIUM 1 G/1
INJECTION, POWDER, LYOPHILIZED, FOR SOLUTION INTRAMUSCULAR; INTRAVENOUS
Refills: 0 | Status: DISCONTINUED | OUTPATIENT
Start: 2025-07-28 | End: 2025-07-29

## 2025-07-28 RX ORDER — INSULIN GLARGINE-YFGN 100 [IU]/ML
14 INJECTION, SOLUTION SUBCUTANEOUS AT BEDTIME
Refills: 0 | Status: DISCONTINUED | OUTPATIENT
Start: 2025-07-28 | End: 2025-07-30

## 2025-07-28 RX ADMIN — LACTULOSE 20 GRAM(S): 10 SOLUTION ORAL at 13:14

## 2025-07-28 RX ADMIN — TACROLIMUS 0.5 MILLIGRAM(S): 0.5 CAPSULE ORAL at 17:34

## 2025-07-28 RX ADMIN — Medication 650 MILLIGRAM(S): at 13:28

## 2025-07-28 RX ADMIN — BUSPIRONE HYDROCHLORIDE 5 MILLIGRAM(S): 15 TABLET ORAL at 13:15

## 2025-07-28 RX ADMIN — Medication 500 MILLIGRAM(S): at 17:31

## 2025-07-28 RX ADMIN — ERTAPENEM SODIUM 100 MILLIGRAM(S): 1 INJECTION, POWDER, LYOPHILIZED, FOR SOLUTION INTRAMUSCULAR; INTRAVENOUS at 12:37

## 2025-07-28 RX ADMIN — BUSPIRONE HYDROCHLORIDE 5 MILLIGRAM(S): 15 TABLET ORAL at 06:17

## 2025-07-28 RX ADMIN — Medication 40 MILLIGRAM(S): at 06:10

## 2025-07-28 RX ADMIN — Medication 500 MILLIGRAM(S): at 06:13

## 2025-07-28 RX ADMIN — BUPROPION HYDROBROMIDE 150 MILLIGRAM(S): 522 TABLET, EXTENDED RELEASE ORAL at 12:37

## 2025-07-28 RX ADMIN — Medication 100 MILLIGRAM(S): at 21:56

## 2025-07-28 RX ADMIN — NYSTATIN 1 APPLICATION(S): 100000 CREAM TOPICAL at 06:13

## 2025-07-28 RX ADMIN — ESCITALOPRAM OXALATE 5 MILLIGRAM(S): 20 TABLET ORAL at 13:15

## 2025-07-28 RX ADMIN — NYSTATIN 1 APPLICATION(S): 100000 CREAM TOPICAL at 21:57

## 2025-07-28 RX ADMIN — INSULIN LISPRO 4: 100 INJECTION, SOLUTION INTRAVENOUS; SUBCUTANEOUS at 17:30

## 2025-07-28 RX ADMIN — INSULIN GLARGINE-YFGN 14 UNIT(S): 100 INJECTION, SOLUTION SUBCUTANEOUS at 21:57

## 2025-07-28 RX ADMIN — Medication 100 MILLIGRAM(S): at 06:11

## 2025-07-28 RX ADMIN — LACTULOSE 20 GRAM(S): 10 SOLUTION ORAL at 06:12

## 2025-07-28 RX ADMIN — Medication 50 MILLIGRAM(S): at 21:56

## 2025-07-28 RX ADMIN — INSULIN LISPRO 6: 100 INJECTION, SOLUTION INTRAVENOUS; SUBCUTANEOUS at 12:17

## 2025-07-28 RX ADMIN — ENOXAPARIN SODIUM 40 MILLIGRAM(S): 100 INJECTION SUBCUTANEOUS at 17:30

## 2025-07-28 RX ADMIN — LACTULOSE 20 GRAM(S): 10 SOLUTION ORAL at 21:56

## 2025-07-28 RX ADMIN — Medication 100 MILLIGRAM(S): at 13:15

## 2025-07-28 RX ADMIN — CARVEDILOL 12.5 MILLIGRAM(S): 3.12 TABLET, FILM COATED ORAL at 06:11

## 2025-07-28 RX ADMIN — Medication 3 MILLIGRAM(S): at 21:56

## 2025-07-28 RX ADMIN — BUSPIRONE HYDROCHLORIDE 5 MILLIGRAM(S): 15 TABLET ORAL at 21:55

## 2025-07-28 RX ADMIN — TACROLIMUS 0.5 MILLIGRAM(S): 0.5 CAPSULE ORAL at 21:57

## 2025-07-28 RX ADMIN — Medication 1 TABLET(S): at 12:37

## 2025-07-28 RX ADMIN — Medication 2 TABLET(S): at 21:56

## 2025-07-28 RX ADMIN — ROSUVASTATIN CALCIUM 10 MILLIGRAM(S): 20 TABLET, FILM COATED ORAL at 21:56

## 2025-07-28 RX ADMIN — Medication 0.1 MILLIGRAM(S): at 06:12

## 2025-07-28 RX ADMIN — INSULIN LISPRO 4 UNIT(S): 100 INJECTION, SOLUTION INTRAVENOUS; SUBCUTANEOUS at 17:30

## 2025-07-28 RX ADMIN — Medication 0.1 MILLIGRAM(S): at 21:56

## 2025-07-28 RX ADMIN — NYSTATIN 1 APPLICATION(S): 100000 CREAM TOPICAL at 13:18

## 2025-07-28 RX ADMIN — POLYETHYLENE GLYCOL 3350 17 GRAM(S): 17 POWDER, FOR SOLUTION ORAL at 06:15

## 2025-07-28 RX ADMIN — POLYETHYLENE GLYCOL 3350 17 GRAM(S): 17 POWDER, FOR SOLUTION ORAL at 17:29

## 2025-07-28 RX ADMIN — PREDNISONE 5 MILLIGRAM(S): 20 TABLET ORAL at 06:17

## 2025-07-28 RX ADMIN — CARVEDILOL 12.5 MILLIGRAM(S): 3.12 TABLET, FILM COATED ORAL at 17:29

## 2025-07-28 RX ADMIN — Medication 88 MICROGRAM(S): at 06:12

## 2025-07-28 RX ADMIN — Medication 0.1 MILLIGRAM(S): at 13:15

## 2025-07-29 DIAGNOSIS — Z86.19 PERSONAL HISTORY OF OTHER INFECTIOUS AND PARASITIC DISEASES: ICD-10-CM

## 2025-07-29 LAB
ANION GAP SERPL CALC-SCNC: 6 MMOL/L — SIGNIFICANT CHANGE UP (ref 5–17)
BASOPHILS # BLD AUTO: 0.03 K/UL — SIGNIFICANT CHANGE UP (ref 0–0.2)
BASOPHILS NFR BLD AUTO: 0.6 % — SIGNIFICANT CHANGE UP (ref 0–2)
BUN SERPL-MCNC: 23 MG/DL — SIGNIFICANT CHANGE UP (ref 7–23)
CALCIUM SERPL-MCNC: 9.3 MG/DL — SIGNIFICANT CHANGE UP (ref 8.5–10.1)
CHLORIDE SERPL-SCNC: 103 MMOL/L — SIGNIFICANT CHANGE UP (ref 96–108)
CO2 SERPL-SCNC: 28 MMOL/L — SIGNIFICANT CHANGE UP (ref 22–31)
CREAT SERPL-MCNC: 1.1 MG/DL — SIGNIFICANT CHANGE UP (ref 0.5–1.3)
EGFR: 74 ML/MIN/1.73M2 — SIGNIFICANT CHANGE UP
EGFR: 74 ML/MIN/1.73M2 — SIGNIFICANT CHANGE UP
EOSINOPHIL # BLD AUTO: 0.11 K/UL — SIGNIFICANT CHANGE UP (ref 0–0.5)
EOSINOPHIL NFR BLD AUTO: 2.2 % — SIGNIFICANT CHANGE UP (ref 0–6)
GLUCOSE BLDC GLUCOMTR-MCNC: 179 MG/DL — HIGH (ref 70–99)
GLUCOSE BLDC GLUCOMTR-MCNC: 204 MG/DL — HIGH (ref 70–99)
GLUCOSE BLDC GLUCOMTR-MCNC: 262 MG/DL — HIGH (ref 70–99)
GLUCOSE BLDC GLUCOMTR-MCNC: 316 MG/DL — HIGH (ref 70–99)
GLUCOSE BLDC GLUCOMTR-MCNC: >600 MG/DL — CRITICAL HIGH (ref 70–99)
GLUCOSE SERPL-MCNC: 269 MG/DL — HIGH (ref 70–99)
HCT VFR BLD CALC: 32.1 % — LOW (ref 39–50)
HGB BLD-MCNC: 10.5 G/DL — LOW (ref 13–17)
IMM GRANULOCYTES # BLD AUTO: 0.04 K/UL — SIGNIFICANT CHANGE UP (ref 0–0.07)
IMM GRANULOCYTES NFR BLD AUTO: 0.8 % — SIGNIFICANT CHANGE UP (ref 0–0.9)
LYMPHOCYTES # BLD AUTO: 0.54 K/UL — LOW (ref 1–3.3)
LYMPHOCYTES NFR BLD AUTO: 10.6 % — LOW (ref 13–44)
MAGNESIUM SERPL-MCNC: 2 MG/DL — SIGNIFICANT CHANGE UP (ref 1.6–2.6)
MCHC RBC-ENTMCNC: 32.7 G/DL — SIGNIFICANT CHANGE UP (ref 32–36)
MCHC RBC-ENTMCNC: 32.9 PG — SIGNIFICANT CHANGE UP (ref 27–34)
MCV RBC AUTO: 100.6 FL — HIGH (ref 80–100)
MONOCYTES # BLD AUTO: 0.78 K/UL — SIGNIFICANT CHANGE UP (ref 0–0.9)
MONOCYTES NFR BLD AUTO: 15.4 % — HIGH (ref 2–14)
NEUTROPHILS # BLD AUTO: 3.58 K/UL — SIGNIFICANT CHANGE UP (ref 1.8–7.4)
NEUTROPHILS NFR BLD AUTO: 70.4 % — SIGNIFICANT CHANGE UP (ref 43–77)
NRBC # BLD AUTO: 0 K/UL — SIGNIFICANT CHANGE UP (ref 0–0)
NRBC # FLD: 0 K/UL — SIGNIFICANT CHANGE UP (ref 0–0)
NRBC BLD AUTO-RTO: 0 /100 WBCS — SIGNIFICANT CHANGE UP (ref 0–0)
PHOSPHATE SERPL-MCNC: 2.4 MG/DL — LOW (ref 2.5–4.5)
PLATELET # BLD AUTO: 154 K/UL — SIGNIFICANT CHANGE UP (ref 150–400)
PMV BLD: 8.9 FL — SIGNIFICANT CHANGE UP (ref 7–13)
POTASSIUM SERPL-MCNC: 4.6 MMOL/L — SIGNIFICANT CHANGE UP (ref 3.5–5.3)
POTASSIUM SERPL-SCNC: 4.6 MMOL/L — SIGNIFICANT CHANGE UP (ref 3.5–5.3)
RBC # BLD: 3.19 M/UL — LOW (ref 4.2–5.8)
RBC # FLD: 14.2 % — SIGNIFICANT CHANGE UP (ref 10.3–14.5)
SODIUM SERPL-SCNC: 137 MMOL/L — SIGNIFICANT CHANGE UP (ref 135–145)
WBC # BLD: 5.08 K/UL — SIGNIFICANT CHANGE UP (ref 3.8–10.5)
WBC # FLD AUTO: 5.08 K/UL — SIGNIFICANT CHANGE UP (ref 3.8–10.5)

## 2025-07-29 PROCEDURE — 99232 SBSQ HOSP IP/OBS MODERATE 35: CPT

## 2025-07-29 PROCEDURE — 99233 SBSQ HOSP IP/OBS HIGH 50: CPT

## 2025-07-29 PROCEDURE — G0545: CPT

## 2025-07-29 RX ORDER — TACROLIMUS 0.5 MG/1
0.5 CAPSULE ORAL
Refills: 0 | Status: DISCONTINUED | OUTPATIENT
Start: 2025-07-29 | End: 2025-07-30

## 2025-07-29 RX ORDER — FLUCONAZOLE 150 MG
200 TABLET ORAL DAILY
Refills: 0 | Status: DISCONTINUED | OUTPATIENT
Start: 2025-07-30 | End: 2025-07-30

## 2025-07-29 RX ORDER — FLUCONAZOLE 150 MG
200 TABLET ORAL ONCE
Refills: 0 | Status: COMPLETED | OUTPATIENT
Start: 2025-07-29 | End: 2025-07-29

## 2025-07-29 RX ADMIN — ENOXAPARIN SODIUM 40 MILLIGRAM(S): 100 INJECTION SUBCUTANEOUS at 17:41

## 2025-07-29 RX ADMIN — CARVEDILOL 12.5 MILLIGRAM(S): 3.12 TABLET, FILM COATED ORAL at 17:56

## 2025-07-29 RX ADMIN — Medication 40 MILLIGRAM(S): at 06:02

## 2025-07-29 RX ADMIN — Medication 650 MILLIGRAM(S): at 19:57

## 2025-07-29 RX ADMIN — Medication 88 MICROGRAM(S): at 06:03

## 2025-07-29 RX ADMIN — INSULIN LISPRO 4 UNIT(S): 100 INJECTION, SOLUTION INTRAVENOUS; SUBCUTANEOUS at 12:50

## 2025-07-29 RX ADMIN — Medication 100 MILLIGRAM(S): at 13:40

## 2025-07-29 RX ADMIN — NYSTATIN 1 APPLICATION(S): 100000 CREAM TOPICAL at 21:45

## 2025-07-29 RX ADMIN — Medication 50 MILLIGRAM(S): at 21:45

## 2025-07-29 RX ADMIN — NYSTATIN 1 APPLICATION(S): 100000 CREAM TOPICAL at 12:39

## 2025-07-29 RX ADMIN — Medication 1 APPLICATION(S): at 12:43

## 2025-07-29 RX ADMIN — Medication 650 MILLIGRAM(S): at 13:47

## 2025-07-29 RX ADMIN — INSULIN GLARGINE-YFGN 14 UNIT(S): 100 INJECTION, SOLUTION SUBCUTANEOUS at 21:55

## 2025-07-29 RX ADMIN — Medication 0.1 MILLIGRAM(S): at 06:02

## 2025-07-29 RX ADMIN — BUSPIRONE HYDROCHLORIDE 5 MILLIGRAM(S): 15 TABLET ORAL at 13:47

## 2025-07-29 RX ADMIN — BUPROPION HYDROBROMIDE 150 MILLIGRAM(S): 522 TABLET, EXTENDED RELEASE ORAL at 13:39

## 2025-07-29 RX ADMIN — Medication 1 TABLET(S): at 13:39

## 2025-07-29 RX ADMIN — Medication 3 MILLIGRAM(S): at 21:45

## 2025-07-29 RX ADMIN — ERTAPENEM SODIUM 100 MILLIGRAM(S): 1 INJECTION, POWDER, LYOPHILIZED, FOR SOLUTION INTRAMUSCULAR; INTRAVENOUS at 08:08

## 2025-07-29 RX ADMIN — POLYETHYLENE GLYCOL 3350 17 GRAM(S): 17 POWDER, FOR SOLUTION ORAL at 06:03

## 2025-07-29 RX ADMIN — INSULIN LISPRO 4: 100 INJECTION, SOLUTION INTRAVENOUS; SUBCUTANEOUS at 08:24

## 2025-07-29 RX ADMIN — LACTULOSE 20 GRAM(S): 10 SOLUTION ORAL at 06:02

## 2025-07-29 RX ADMIN — INSULIN LISPRO 3: 100 INJECTION, SOLUTION INTRAVENOUS; SUBCUTANEOUS at 12:51

## 2025-07-29 RX ADMIN — Medication 650 MILLIGRAM(S): at 06:02

## 2025-07-29 RX ADMIN — CARVEDILOL 12.5 MILLIGRAM(S): 3.12 TABLET, FILM COATED ORAL at 06:03

## 2025-07-29 RX ADMIN — BUSPIRONE HYDROCHLORIDE 5 MILLIGRAM(S): 15 TABLET ORAL at 06:02

## 2025-07-29 RX ADMIN — Medication 100 MILLIGRAM(S): at 06:02

## 2025-07-29 RX ADMIN — NYSTATIN 1 APPLICATION(S): 100000 CREAM TOPICAL at 06:03

## 2025-07-29 RX ADMIN — Medication 500 MILLIGRAM(S): at 06:03

## 2025-07-29 RX ADMIN — TACROLIMUS 0.5 MILLIGRAM(S): 0.5 CAPSULE ORAL at 21:48

## 2025-07-29 RX ADMIN — PREDNISONE 5 MILLIGRAM(S): 20 TABLET ORAL at 06:03

## 2025-07-29 RX ADMIN — INSULIN LISPRO 2: 100 INJECTION, SOLUTION INTRAVENOUS; SUBCUTANEOUS at 17:40

## 2025-07-29 RX ADMIN — Medication 0.1 MILLIGRAM(S): at 13:39

## 2025-07-29 RX ADMIN — Medication 650 MILLIGRAM(S): at 20:30

## 2025-07-29 RX ADMIN — INSULIN LISPRO 4 UNIT(S): 100 INJECTION, SOLUTION INTRAVENOUS; SUBCUTANEOUS at 08:23

## 2025-07-29 RX ADMIN — INSULIN LISPRO 4 UNIT(S): 100 INJECTION, SOLUTION INTRAVENOUS; SUBCUTANEOUS at 17:39

## 2025-07-29 RX ADMIN — Medication 100 MILLIGRAM(S): at 21:46

## 2025-07-29 RX ADMIN — INSULIN GLARGINE-YFGN 14 UNIT(S): 100 INJECTION, SOLUTION SUBCUTANEOUS at 08:22

## 2025-07-29 RX ADMIN — BUSPIRONE HYDROCHLORIDE 5 MILLIGRAM(S): 15 TABLET ORAL at 20:28

## 2025-07-29 RX ADMIN — Medication 200 MILLIGRAM(S): at 13:39

## 2025-07-29 RX ADMIN — Medication 0.1 MILLIGRAM(S): at 21:45

## 2025-07-29 RX ADMIN — ROSUVASTATIN CALCIUM 10 MILLIGRAM(S): 20 TABLET, FILM COATED ORAL at 21:46

## 2025-07-29 RX ADMIN — Medication 500 MILLIGRAM(S): at 17:43

## 2025-07-29 RX ADMIN — TACROLIMUS 0.5 MILLIGRAM(S): 0.5 CAPSULE ORAL at 09:27

## 2025-07-29 RX ADMIN — Medication 2 TABLET(S): at 21:46

## 2025-07-30 ENCOUNTER — TRANSCRIPTION ENCOUNTER (OUTPATIENT)
Age: 70
End: 2025-07-30

## 2025-07-30 VITALS
HEART RATE: 59 BPM | DIASTOLIC BLOOD PRESSURE: 61 MMHG | OXYGEN SATURATION: 96 % | SYSTOLIC BLOOD PRESSURE: 117 MMHG | TEMPERATURE: 98 F | RESPIRATION RATE: 18 BRPM

## 2025-07-30 LAB
ANION GAP SERPL CALC-SCNC: 2 MMOL/L — LOW (ref 5–17)
BASOPHILS # BLD AUTO: 0.02 K/UL — SIGNIFICANT CHANGE UP (ref 0–0.2)
BASOPHILS NFR BLD AUTO: 0.5 % — SIGNIFICANT CHANGE UP (ref 0–2)
BUN SERPL-MCNC: 20 MG/DL — SIGNIFICANT CHANGE UP (ref 7–23)
CALCIUM SERPL-MCNC: 9.2 MG/DL — SIGNIFICANT CHANGE UP (ref 8.5–10.1)
CHLORIDE SERPL-SCNC: 105 MMOL/L — SIGNIFICANT CHANGE UP (ref 96–108)
CO2 SERPL-SCNC: 32 MMOL/L — HIGH (ref 22–31)
CREAT SERPL-MCNC: 0.85 MG/DL — SIGNIFICANT CHANGE UP (ref 0.5–1.3)
EGFR: 95 ML/MIN/1.73M2 — SIGNIFICANT CHANGE UP
EGFR: 95 ML/MIN/1.73M2 — SIGNIFICANT CHANGE UP
EOSINOPHIL # BLD AUTO: 0.05 K/UL — SIGNIFICANT CHANGE UP (ref 0–0.5)
EOSINOPHIL NFR BLD AUTO: 1.4 % — SIGNIFICANT CHANGE UP (ref 0–6)
GLUCOSE BLDC GLUCOMTR-MCNC: 221 MG/DL — HIGH (ref 70–99)
GLUCOSE BLDC GLUCOMTR-MCNC: 260 MG/DL — HIGH (ref 70–99)
GLUCOSE SERPL-MCNC: 192 MG/DL — HIGH (ref 70–99)
HCT VFR BLD CALC: 31.3 % — LOW (ref 39–50)
HGB BLD-MCNC: 10.3 G/DL — LOW (ref 13–17)
IMM GRANULOCYTES # BLD AUTO: 0.03 K/UL — SIGNIFICANT CHANGE UP (ref 0–0.07)
IMM GRANULOCYTES NFR BLD AUTO: 0.8 % — SIGNIFICANT CHANGE UP (ref 0–0.9)
LYMPHOCYTES # BLD AUTO: 0.48 K/UL — LOW (ref 1–3.3)
LYMPHOCYTES NFR BLD AUTO: 13 % — SIGNIFICANT CHANGE UP (ref 13–44)
MCHC RBC-ENTMCNC: 32.9 G/DL — SIGNIFICANT CHANGE UP (ref 32–36)
MCHC RBC-ENTMCNC: 33.1 PG — SIGNIFICANT CHANGE UP (ref 27–34)
MCV RBC AUTO: 100.6 FL — HIGH (ref 80–100)
MONOCYTES # BLD AUTO: 0.33 K/UL — SIGNIFICANT CHANGE UP (ref 0–0.9)
MONOCYTES NFR BLD AUTO: 8.9 % — SIGNIFICANT CHANGE UP (ref 2–14)
NEUTROPHILS # BLD AUTO: 2.78 K/UL — SIGNIFICANT CHANGE UP (ref 1.8–7.4)
NEUTROPHILS NFR BLD AUTO: 75.4 % — SIGNIFICANT CHANGE UP (ref 43–77)
NRBC # BLD AUTO: 0 K/UL — SIGNIFICANT CHANGE UP (ref 0–0)
NRBC # FLD: 0 K/UL — SIGNIFICANT CHANGE UP (ref 0–0)
NRBC BLD AUTO-RTO: 0 /100 WBCS — SIGNIFICANT CHANGE UP (ref 0–0)
PLATELET # BLD AUTO: 143 K/UL — LOW (ref 150–400)
PMV BLD: 9.3 FL — SIGNIFICANT CHANGE UP (ref 7–13)
POTASSIUM SERPL-MCNC: 4.5 MMOL/L — SIGNIFICANT CHANGE UP (ref 3.5–5.3)
POTASSIUM SERPL-SCNC: 4.5 MMOL/L — SIGNIFICANT CHANGE UP (ref 3.5–5.3)
RBC # BLD: 3.11 M/UL — LOW (ref 4.2–5.8)
RBC # FLD: 14.2 % — SIGNIFICANT CHANGE UP (ref 10.3–14.5)
SODIUM SERPL-SCNC: 139 MMOL/L — SIGNIFICANT CHANGE UP (ref 135–145)
TACROLIMUS SERPL-MCNC: 4 NG/ML — SIGNIFICANT CHANGE UP
WBC # BLD: 3.69 K/UL — LOW (ref 3.8–10.5)
WBC # FLD AUTO: 3.69 K/UL — LOW (ref 3.8–10.5)

## 2025-07-30 PROCEDURE — 84100 ASSAY OF PHOSPHORUS: CPT

## 2025-07-30 PROCEDURE — 87077 CULTURE AEROBIC IDENTIFY: CPT

## 2025-07-30 PROCEDURE — 99285 EMERGENCY DEPT VISIT HI MDM: CPT | Mod: 25

## 2025-07-30 PROCEDURE — 99232 SBSQ HOSP IP/OBS MODERATE 35: CPT

## 2025-07-30 PROCEDURE — 87086 URINE CULTURE/COLONY COUNT: CPT

## 2025-07-30 PROCEDURE — 80197 ASSAY OF TACROLIMUS: CPT

## 2025-07-30 PROCEDURE — G0545: CPT

## 2025-07-30 PROCEDURE — 96365 THER/PROPH/DIAG IV INF INIT: CPT

## 2025-07-30 PROCEDURE — 74176 CT ABD & PELVIS W/O CONTRAST: CPT

## 2025-07-30 PROCEDURE — 96375 TX/PRO/DX INJ NEW DRUG ADDON: CPT

## 2025-07-30 PROCEDURE — 36415 COLL VENOUS BLD VENIPUNCTURE: CPT

## 2025-07-30 PROCEDURE — 80048 BASIC METABOLIC PNL TOTAL CA: CPT

## 2025-07-30 PROCEDURE — 80053 COMPREHEN METABOLIC PANEL: CPT

## 2025-07-30 PROCEDURE — 84145 PROCALCITONIN (PCT): CPT

## 2025-07-30 PROCEDURE — 82962 GLUCOSE BLOOD TEST: CPT

## 2025-07-30 PROCEDURE — 85025 COMPLETE CBC W/AUTO DIFF WBC: CPT

## 2025-07-30 PROCEDURE — 81001 URINALYSIS AUTO W/SCOPE: CPT

## 2025-07-30 PROCEDURE — 87040 BLOOD CULTURE FOR BACTERIA: CPT

## 2025-07-30 PROCEDURE — 84443 ASSAY THYROID STIM HORMONE: CPT

## 2025-07-30 PROCEDURE — 83735 ASSAY OF MAGNESIUM: CPT

## 2025-07-30 PROCEDURE — 83036 HEMOGLOBIN GLYCOSYLATED A1C: CPT

## 2025-07-30 PROCEDURE — 97162 PT EVAL MOD COMPLEX 30 MIN: CPT

## 2025-07-30 PROCEDURE — 83690 ASSAY OF LIPASE: CPT

## 2025-07-30 RX ORDER — INSULIN LISPRO 100 U/ML
7 INJECTION, SOLUTION INTRAVENOUS; SUBCUTANEOUS
Qty: 1 | Refills: 0
Start: 2025-07-30

## 2025-07-30 RX ORDER — NYSTATIN 100000 [USP'U]/G
1 CREAM TOPICAL
Qty: 0 | Refills: 0 | DISCHARGE
Start: 2025-07-30

## 2025-07-30 RX ORDER — FLUCONAZOLE 150 MG
1 TABLET ORAL
Qty: 0 | Refills: 0 | DISCHARGE
Start: 2025-07-30

## 2025-07-30 RX ADMIN — INSULIN LISPRO 2: 100 INJECTION, SOLUTION INTRAVENOUS; SUBCUTANEOUS at 09:07

## 2025-07-30 RX ADMIN — Medication 88 MICROGRAM(S): at 05:49

## 2025-07-30 RX ADMIN — NYSTATIN 1 APPLICATION(S): 100000 CREAM TOPICAL at 05:51

## 2025-07-30 RX ADMIN — Medication 40 MILLIGRAM(S): at 05:49

## 2025-07-30 RX ADMIN — BUSPIRONE HYDROCHLORIDE 5 MILLIGRAM(S): 15 TABLET ORAL at 13:04

## 2025-07-30 RX ADMIN — PREDNISONE 5 MILLIGRAM(S): 20 TABLET ORAL at 05:50

## 2025-07-30 RX ADMIN — BUPROPION HYDROBROMIDE 150 MILLIGRAM(S): 522 TABLET, EXTENDED RELEASE ORAL at 13:04

## 2025-07-30 RX ADMIN — ESCITALOPRAM OXALATE 5 MILLIGRAM(S): 20 TABLET ORAL at 13:04

## 2025-07-30 RX ADMIN — Medication 100 MILLIGRAM(S): at 13:04

## 2025-07-30 RX ADMIN — INSULIN GLARGINE-YFGN 14 UNIT(S): 100 INJECTION, SOLUTION SUBCUTANEOUS at 09:07

## 2025-07-30 RX ADMIN — Medication 100 MILLIGRAM(S): at 05:50

## 2025-07-30 RX ADMIN — INSULIN LISPRO 4 UNIT(S): 100 INJECTION, SOLUTION INTRAVENOUS; SUBCUTANEOUS at 13:05

## 2025-07-30 RX ADMIN — CARVEDILOL 12.5 MILLIGRAM(S): 3.12 TABLET, FILM COATED ORAL at 05:50

## 2025-07-30 RX ADMIN — TACROLIMUS 0.5 MILLIGRAM(S): 0.5 CAPSULE ORAL at 09:07

## 2025-07-30 RX ADMIN — NYSTATIN 1 APPLICATION(S): 100000 CREAM TOPICAL at 13:05

## 2025-07-30 RX ADMIN — INSULIN LISPRO 4 UNIT(S): 100 INJECTION, SOLUTION INTRAVENOUS; SUBCUTANEOUS at 09:07

## 2025-07-30 RX ADMIN — Medication 500 MILLIGRAM(S): at 05:50

## 2025-07-30 RX ADMIN — BUSPIRONE HYDROCHLORIDE 5 MILLIGRAM(S): 15 TABLET ORAL at 05:55

## 2025-07-30 RX ADMIN — Medication 200 MILLIGRAM(S): at 13:04

## 2025-07-30 RX ADMIN — Medication 0.1 MILLIGRAM(S): at 05:50

## 2025-07-30 RX ADMIN — INSULIN LISPRO 3: 100 INJECTION, SOLUTION INTRAVENOUS; SUBCUTANEOUS at 13:05

## 2025-07-30 RX ADMIN — Medication 1 TABLET(S): at 13:04

## 2025-07-30 RX ADMIN — Medication 0.1 MILLIGRAM(S): at 13:03

## 2025-07-31 RX ORDER — FLUCONAZOLE 150 MG
1 TABLET ORAL
Qty: 8 | Refills: 0
Start: 2025-07-31 | End: 2025-08-07

## 2025-08-01 LAB
CULTURE RESULTS: SIGNIFICANT CHANGE UP
CULTURE RESULTS: SIGNIFICANT CHANGE UP
SPECIMEN SOURCE: SIGNIFICANT CHANGE UP
SPECIMEN SOURCE: SIGNIFICANT CHANGE UP